# Patient Record
Sex: MALE | Race: WHITE | Employment: FULL TIME | URBAN - METROPOLITAN AREA
[De-identification: names, ages, dates, MRNs, and addresses within clinical notes are randomized per-mention and may not be internally consistent; named-entity substitution may affect disease eponyms.]

---

## 2017-02-17 ENCOUNTER — MEDICAL CORRESPONDENCE (OUTPATIENT)
Dept: TRANSPLANT | Facility: CLINIC | Age: 57
End: 2017-02-17

## 2017-04-27 ENCOUNTER — RADIANT APPOINTMENT (OUTPATIENT)
Dept: CT IMAGING | Facility: CLINIC | Age: 57
End: 2017-04-27

## 2017-04-27 DIAGNOSIS — Z13.9 SCREENING: ICD-10-CM

## 2017-04-27 PROCEDURE — 75571 CT HRT W/O DYE W/CA TEST: CPT | Performed by: RADIOLOGY

## 2017-05-01 ENCOUNTER — TELEPHONE (OUTPATIENT)
Dept: GENERAL RADIOLOGY | Facility: CLINIC | Age: 57
End: 2017-05-01

## 2017-05-01 NOTE — TELEPHONE ENCOUNTER
Called [facility]ille with results of CT calcium score which was 0. This means the patient has no plaque buildup and a low risk of having a heart attack. Encouraged patient to follow up with PCP and reviewed ways to reduce risk of developing heart disease such as exercise regularly, eat a healthy diet full of fruits and vegetables, decrease risk factors by no smoking, limiting alcohol intake and stress. All questions answered, patient verbalized understanding.

## 2018-01-14 ENCOUNTER — MEDICAL CORRESPONDENCE (OUTPATIENT)
Dept: TRANSPLANT | Facility: CLINIC | Age: 58
End: 2018-01-14

## 2018-05-02 ENCOUNTER — TRANSFERRED RECORDS (OUTPATIENT)
Dept: HEALTH INFORMATION MANAGEMENT | Facility: CLINIC | Age: 58
End: 2018-05-02

## 2018-10-22 ENCOUNTER — ALLIED HEALTH/NURSE VISIT (OUTPATIENT)
Dept: TRANSPLANT | Facility: CLINIC | Age: 58
End: 2018-10-22
Attending: INTERNAL MEDICINE
Payer: COMMERCIAL

## 2018-10-22 VITALS
RESPIRATION RATE: 18 BRPM | DIASTOLIC BLOOD PRESSURE: 74 MMHG | TEMPERATURE: 97.8 F | HEIGHT: 70 IN | BODY MASS INDEX: 26.71 KG/M2 | SYSTOLIC BLOOD PRESSURE: 139 MMHG | OXYGEN SATURATION: 100 % | HEART RATE: 97 BPM | WEIGHT: 186.6 LBS

## 2018-10-22 DIAGNOSIS — C90.01 MULTIPLE MYELOMA IN REMISSION (H): ICD-10-CM

## 2018-10-22 DIAGNOSIS — Z71.9 VISIT FOR COUNSELING: Primary | ICD-10-CM

## 2018-10-22 DIAGNOSIS — C90.00 MULTIPLE MYELOMA NOT HAVING ACHIEVED REMISSION (H): Primary | ICD-10-CM

## 2018-10-22 PROCEDURE — 40000268 ZZH STATISTIC NO CHARGES: Mod: ZF

## 2018-10-22 PROCEDURE — G0463 HOSPITAL OUTPT CLINIC VISIT: HCPCS

## 2018-10-22 RX ORDER — CHOLECALCIFEROL (VITAMIN D3) 50 MCG
TABLET ORAL
COMMUNITY
Start: 2004-01-01 | End: 2018-10-22

## 2018-10-22 RX ORDER — QUINIDINE GLUCONATE 324 MG
1 TABLET, EXTENDED RELEASE ORAL DAILY
Status: ON HOLD | COMMUNITY
Start: 2001-01-01 | End: 2019-01-23

## 2018-10-22 RX ORDER — ACETAMINOPHEN 160 MG
2000 TABLET,DISINTEGRATING ORAL DAILY
COMMUNITY

## 2018-10-22 RX ORDER — PROCHLORPERAZINE MALEATE 10 MG
TABLET ORAL
Refills: 6 | COMMUNITY
Start: 2018-03-31 | End: 2019-01-14

## 2018-10-22 RX ORDER — LENALIDOMIDE 25 MG/1
CAPSULE ORAL
COMMUNITY
Start: 2018-09-12 | End: 2019-01-14

## 2018-10-22 RX ORDER — CALCIPOTRIENE 0.05 MG/ML
SOLUTION TOPICAL
COMMUNITY
End: 2018-10-22

## 2018-10-22 RX ORDER — ACYCLOVIR 400 MG/1
400 TABLET ORAL EVERY 12 HOURS
Refills: 11 | Status: ON HOLD | COMMUNITY
Start: 2018-09-16 | End: 2019-05-17

## 2018-10-22 RX ORDER — OMEPRAZOLE 20 MG/1
20 TABLET, DELAYED RELEASE ORAL DAILY
COMMUNITY
Start: 2018-04-07 | End: 2019-07-10

## 2018-10-22 RX ORDER — DEXAMETHASONE 4 MG/1
TABLET ORAL
COMMUNITY
Start: 2018-04-04 | End: 2019-01-14

## 2018-10-22 RX ORDER — PREDNISOLONE ACETATE 10 MG/ML
1-2 SUSPENSION/ DROPS OPHTHALMIC EVERY 4 HOURS PRN
COMMUNITY
Start: 2015-12-09 | End: 2019-05-21

## 2018-10-22 RX ORDER — AMOXICILLIN 500 MG/1
500 CAPSULE ORAL
Refills: 1 | COMMUNITY
Start: 2017-12-30 | End: 2019-05-06

## 2018-10-22 RX ORDER — ASPIRIN 325 MG
325 TABLET ORAL DAILY
COMMUNITY
Start: 2018-04-04 | End: 2019-01-15

## 2018-10-22 RX ORDER — PHENOL 1.4 %
2 AEROSOL, SPRAY (ML) MUCOUS MEMBRANE DAILY
COMMUNITY

## 2018-10-22 RX ORDER — CALCIPOTRIENE 0.05 MG/ML
SOLUTION TOPICAL 2 TIMES DAILY PRN
Refills: 1 | Status: ON HOLD | COMMUNITY
Start: 2017-12-20 | End: 2019-06-05

## 2018-10-22 RX ORDER — ACYCLOVIR 400 MG/1
TABLET ORAL
COMMUNITY
Start: 2018-04-04 | End: 2018-10-22

## 2018-10-22 RX ORDER — TOBRAMYCIN 3 MG/ML
SOLUTION/ DROPS OPHTHALMIC
Refills: 0 | COMMUNITY
Start: 2017-11-18 | End: 2018-10-22

## 2018-10-22 RX ORDER — FOLIC ACID 1 MG/1
TABLET ORAL
COMMUNITY
Start: 2015-12-02 | End: 2018-10-22

## 2018-10-22 NOTE — MR AVS SNAPSHOT
After Visit Summary   10/22/2018    Angel Yanez    MRN: [id number]           Patient Information     Date Of Birth          1960        Visit Information        Provider Department      10/22/2018 4:15 PM Janine Loco LICSW;  2 116 CONSULT [facility] Blood and Marrow Transplant        Today's Diagnoses     Visit for counseling    -  1          [facility] [place] [place]  [address] [place] [address]  Phone: [phone]  Clinic Hours:   Monday-Thursday:7am to 7pm   Friday: 7am to 5pm   Weekends and holidays:    8am to noon (in general)  If your fever is 100.5  or greater,   call the clinic.  After hours call the   hospital at [phone] or   [phone]. Ask for the BMT   fellow on-call            Follow-ups after your visit        Who to contact     If you have questions or need follow up information about today's clinic visit or your schedule please contact [facility] BLOOD AND MARROW TRANSPLANT directly at [phone].  Normal or non-critical lab and imaging results will be communicated to you by Everplaceshart, letter or phone within 4 business days after the clinic has received the results. If you do not hear from us within 7 days, please contact the clinic through Shopogoli[employer] or phone. If you have a critical or abnormal lab result, we will notify you by phone as soon as possible.  Submit refill requests through [employer] or call your pharmacy and they will forward the refill request to us. Please allow 3 business days for your refill to be completed.          Additional Information About Your Visit        Everplaceshart Information     [employer] gives you secure access to your electronic health record. If you see a primary care provider, you can also send messages to your care team and make appointments. If you have questions, please call your primary care clinic.  If you do not have a primary care provider, please call [phone] and they will assist you.        Care  EveryWhere ID     This is your Care EveryWhere ID. This could be used by other organizations to access your [place] medical records  [id number]         Blood Pressure from Last 3 Encounters:   10/22/18 139/74    Weight from Last 3 Encounters:   10/22/18 84.6 kg (186 lb 9.6 oz)              Today, you had the following     No orders found for display         Today's Medication Changes          These changes are accurate as of 10/22/18  4:17 PM.  If you have any questions, ask your nurse or doctor.               These medicines have changed or have updated prescriptions.        Dose/Directions    acyclovir 400 MG tablet   Commonly known as:  ZOVIRAX   This may have changed:  Another medication with the same name was removed. Continue taking this medication, and follow the directions you see here.        Refills:  11       calcipotriene 0.005 % Soln solution   Commonly known as:  DOVONOX   This may have changed:  Another medication with the same name was removed. Continue taking this medication, and follow the directions you see here.        Refills:  1       vitamin D3 2000 units Caps   This may have changed:  Another medication with the same name was removed. Continue taking this medication, and follow the directions you see here.        Dose:  2000 Units   Take 2,000 Units by mouth   Refills:  0         Stop taking these medicines if you haven't already. Please contact your care team if you have questions.     amoxicillin-clavulanate 875-125 MG per tablet   Commonly known as:  AUGMENTIN           folic acid 1 MG tablet   Commonly known as:  FOLVITE           omeprazole 20 MG CR capsule   Commonly known as:  priLOSEC           tobramycin 0.3 % ophthalmic solution   Commonly known as:  TOBREX                    Recent Review Flowsheet Data     BMT Recent Results Latest Ref Rng & Units 11/11/2005 11/15/2005 11/18/2005 11/22/2005 2/24/2006 7/31/2006 2/14/2007    WBC 4.0 - 11.0 10e9/L 3.9(L) 5.3 5.0 4.9 4.1 4.4 5.1     Hemoglobin 13.3 - 17.7 g/dL 15.0 14.9 14.1 14.2 14.6 14.0 14.2    Platelet Count 150 - 450 10e9/L 240 251 239 269 247 222 297    Neutrophils (Absolute) 1.6 - 8.3 10e9/L 2.6 3.1 2.9 2.8 2.1 2.6 2.9    INR 0.86 - 1.14 - - - - - - -    Sodium 133 - 144 mmol/L 142 141 142 142 140 144 143    Potassium 3.4 - 5.3 mmol/L 3.9 4.0 4.1 4.0 4.0 4.0 4.3    Chloride 94 - 109 mmol/L 104 101 105 104 102 108 102    Glucose 60 - 110 mg/dL 122(H) 99 88 91 80 97 77    Urea Nitrogen 5 - 24 mg/dL 13 9 17 13 16 18 15    Creatinine 0.80 - 1.50 mg/dL 0.96 1.03 0.89 1.01 1.01 0.87 1.21    Calcium (Total) 8.5 - 10.4 mg/dL 9.3 9.3 9.0 9.0 9.4 8.9 9.4    Protein (Total) 6.0 - 8.2 g/dL - - 7.9 8.0 8.2 7.8 8.1    Albumin 3.3 - 4.6 g/dL - - 4.2 4.3 - 4.4 4.6    Alkaline Phosphatase 40 - 150 U/L - - 70 61 69 71 93    AST 0 - 55 U/L - - 37 37 - 35 38    ALT 0 - 70 U/L - - 33 37 32 29 44    MCV 78 - 100 fl 95 96 95 97 99 95 96               Primary Care Provider Office Phone # Fax #    [facility] [phone]       [address]  [facility] [address]        Equal Access to Services     PATRICIA HOLLOWAY AH: Hadii aad ku donal Sojoan, waaxda luqadaha, qaybta kaalmada adeegyada, sadie tompkins. [facility] [phone].    ATENCIÓN: Si habla español, tiene a torres disposición servicios gratuitos de asistencia lingüística. Sean vanessa [phone].    We comply with applicable federal civil rights laws and [place] laws. We do not discriminate on the basis of race, color, national origin, age, disability, sex, sexual orientation, or gender identity.            Thank you!     Thank you for choosing [facility] BLOOD AND MARROW TRANSPLANT  for your care. Our goal is always to provide you with excellent care. Hearing back from our patients is one way we can continue to improve our services. Please take a few minutes to complete the written survey that you may receive in the mail after your visit with us.  Thank you!             Your Updated Medication List - Protect others around you: Learn how to safely use, store and throw away your medicines at www.disposemymeds.org.          This list is accurate as of 10/22/18  4:17 PM.  Always use your most recent med list.                   Brand Name Dispense Instructions for use Diagnosis    acyclovir 400 MG tablet    ZOVIRAX          amoxicillin 500 MG capsule    AMOXIL          aspirin 325 MG tablet           calcipotriene 0.005 % Soln solution    DOVONOX          calcium carbonate 600 MG tablet   Generic drug:  calcium carbonate      Take 2 tablets by mouth        dexamethasone 4 MG tablet    DECADRON          Glucosamine-Chondroitin Tabs           methotrexate 2.5 MG tablet CHEMO           prednisoLONE acetate 1 % ophthalmic susp    PRED FORTE          priLOSEC OTC 20 MG tablet   Generic drug:  omeprazole           prochlorperazine 10 MG tablet    COMPAZINE          REVLIMID 25 MG Caps capsule CHEMOTHERAPY   Generic drug:  LENalidomide           UNABLE TO FIND      once a week. Velcade        UNABLE TO FIND           vitamin D3 2000 units Caps      Take 2,000 Units by mouth

## 2018-10-22 NOTE — NURSING NOTE
"Oncology Rooming Note    October 22, 2018 2:43 PM   Angel Yanez is a 58 year old male who presents for:    Chief Complaint   Patient presents with     RECHECK     Pt is here for a New Eval for MM     Initial Vitals: /74  Pulse 97  Temp 97.8  F (36.6  C) (Oral)  Resp 18  Ht 1.79 m (5' 10.47\")  Wt 84.6 kg (186 lb 9.6 oz)  SpO2 100%  BMI 26.42 kg/m2 Estimated body mass index is 26.42 kg/(m^2) as calculated from the following:    Height as of this encounter: 1.79 m (5' 10.47\").    Weight as of this encounter: 84.6 kg (186 lb 9.6 oz). Body surface area is 2.05 meters squared.  Data Unavailable Comment: Data Unavailable   No LMP for male patient.  Allergies reviewed: Yes  Medications reviewed: Yes    Medications: Medication refills not needed today.  Pharmacy name entered into EPIC: Data Unavailable    Clinical concerns: none     6 minutes for nursing intake (face to face time)     Shohsana Salazar MA              "

## 2018-10-22 NOTE — MR AVS SNAPSHOT
After Visit Summary   10/22/2018    Angel Yanez    MRN: [id number]           Patient Information     Date Of Birth          1960        Visit Information        Provider Department      10/22/2018 2:45 PM  BMT DOM [facility] Blood and Marrow Transplant        Today's Diagnoses     Multiple myeloma not having achieved remission (H)    -  1          [facility] ([facility] [place]  [address] [place] [address]  Phone: [phone]  Clinic Hours:   Monday-Thursday:7am to 7pm   Friday: 7am to 5pm   Weekends and holidays:    8am to noon (in general)  If your fever is 100.5  or greater,   call the clinic.  After hours call the   hospital at [phone] or   [phone]. Ask for the BMT   fellow on-call            Follow-ups after your visit        Who to contact     If you have questions or need follow up information about today's clinic visit or your schedule please contact [facility] BLOOD AND MARROW TRANSPLANT directly at [phone].  Normal or non-critical lab and imaging results will be communicated to you by "[employer]hart, letter or phone within 4 business days after the clinic has received the results. If you do not hear from us within 7 days, please contact the clinic through N-Trigt or phone. If you have a critical or abnormal lab result, we will notify you by phone as soon as possible.  Submit refill requests through [employer]un or call your pharmacy and they will forward the refill request to us. Please allow 3 business days for your refill to be completed.          Additional Information About Your Visit        MyChart Information     [employer] gives you secure access to your electronic health record. If you see a primary care provider, you can also send messages to your care team and make appointments. If you have questions, please call your primary care clinic.  If you do not have a primary care provider, please call [phone] and they will assist you.        Care EveryWhere  "ID     This is your Care EveryWhere ID. This could be used by other organizations to access your [place] medical records  [id number]        Your Vitals Were     Pulse Temperature Respirations Height Pulse Oximetry BMI (Body Mass Index)    97 97.8  F (36.6  C) (Oral) 18 1.79 m (5' 10.47\") 100% 26.42 kg/m2       Blood Pressure from Last 3 Encounters:   10/22/18 139/74    Weight from Last 3 Encounters:   10/22/18 84.6 kg (186 lb 9.6 oz)              Today, you had the following     No orders found for display         Today's Medication Changes          These changes are accurate as of 10/22/18 11:59 PM.  If you have any questions, ask your nurse or doctor.               These medicines have changed or have updated prescriptions.        Dose/Directions    acyclovir 400 MG tablet   Commonly known as:  ZOVIRAX   This may have changed:  Another medication with the same name was removed. Continue taking this medication, and follow the directions you see here.        Refills:  11       calcipotriene 0.005 % Soln solution   Commonly known as:  DOVONOX   This may have changed:  Another medication with the same name was removed. Continue taking this medication, and follow the directions you see here.        Refills:  1       vitamin D3 2000 units Caps   This may have changed:  Another medication with the same name was removed. Continue taking this medication, and follow the directions you see here.        Dose:  2000 Units   Take 2,000 Units by mouth   Refills:  0         Stop taking these medicines if you haven't already. Please contact your care team if you have questions.     amoxicillin-clavulanate 875-125 MG per tablet   Commonly known as:  AUGMENTIN           folic acid 1 MG tablet   Commonly known as:  FOLVITE           omeprazole 20 MG CR capsule   Commonly known as:  priLOSEC           tobramycin 0.3 % ophthalmic solution   Commonly known as:  TOBREX                    Recent Review Flowsheet Data     BMT Recent " Results Latest Ref Rng & Units 11/11/2005 11/15/2005 11/18/2005 11/22/2005 2/24/2006 7/31/2006 2/14/2007    WBC 4.0 - 11.0 10e9/L 3.9(L) 5.3 5.0 4.9 4.1 4.4 5.1    Hemoglobin 13.3 - 17.7 g/dL 15.0 14.9 14.1 14.2 14.6 14.0 14.2    Platelet Count 150 - 450 10e9/L 240 251 239 269 247 222 297    Neutrophils (Absolute) 1.6 - 8.3 10e9/L 2.6 3.1 2.9 2.8 2.1 2.6 2.9    INR 0.86 - 1.14 - - - - - - -    Sodium 133 - 144 mmol/L 142 141 142 142 140 144 143    Potassium 3.4 - 5.3 mmol/L 3.9 4.0 4.1 4.0 4.0 4.0 4.3    Chloride 94 - 109 mmol/L 104 101 105 104 102 108 102    Glucose 60 - 110 mg/dL 122(H) 99 88 91 80 97 77    Urea Nitrogen 5 - 24 mg/dL 13 9 17 13 16 18 15    Creatinine 0.80 - 1.50 mg/dL 0.96 1.03 0.89 1.01 1.01 0.87 1.21    Calcium (Total) 8.5 - 10.4 mg/dL 9.3 9.3 9.0 9.0 9.4 8.9 9.4    Protein (Total) 6.0 - 8.2 g/dL - - 7.9 8.0 8.2 7.8 8.1    Albumin 3.3 - 4.6 g/dL - - 4.2 4.3 - 4.4 4.6    Alkaline Phosphatase 40 - 150 U/L - - 70 61 69 71 93    AST 0 - 55 U/L - - 37 37 - 35 38    ALT 0 - 70 U/L - - 33 37 32 29 44    MCV 78 - 100 fl 95 96 95 97 99 95 96               Primary Care Provider Office Phone # Fax #    [facility] [phone]       [address]  [facility] [address]        Equal Access to Services     PATRICIA HOLLOWAY AH: Hadii aad ku hadjose albertotiffanie Sojoan, waaxda luqadaha, qaybta kaalmada luis, sadie tompkins. So [facility] [place] [phone].    ATENCIÓN: Si habla español, tiene a torres disposición servicios gratuitos de asistencia lingüística. Llame al [phone].    We comply with applicable federal civil rights laws and [place] laws. We do not discriminate on the basis of race, color, national origin, age, disability, sex, sexual orientation, or gender identity.            Thank you!     Thank you for choosing [facility] BLOOD AND MARROW TRANSPLANT  for your care. Our goal is always to provide you with excellent care. Hearing back from our patients is  one way we can continue to improve our services. Please take a few minutes to complete the written survey that you may receive in the mail after your visit with us. Thank you!             Your Updated Medication List - Protect others around you: Learn how to safely use, store and throw away your medicines at www.disposemymeds.org.          This list is accurate as of 10/22/18 11:59 PM.  Always use your most recent med list.                   Brand Name Dispense Instructions for use Diagnosis    acyclovir 400 MG tablet    ZOVIRAX          amoxicillin 500 MG capsule    AMOXIL          aspirin 325 MG tablet           calcipotriene 0.005 % Soln solution    DOVONOX          calcium carbonate 600 MG tablet   Generic drug:  calcium carbonate      Take 2 tablets by mouth        dexamethasone 4 MG tablet    DECADRON          Glucosamine-Chondroitin Tabs           methotrexate 2.5 MG tablet CHEMO           prednisoLONE acetate 1 % ophthalmic susp    PRED FORTE          priLOSEC OTC 20 MG tablet   Generic drug:  omeprazole           prochlorperazine 10 MG tablet    COMPAZINE          REVLIMID 25 MG Caps capsule CHEMOTHERAPY   Generic drug:  LENalidomide           UNABLE TO FIND      once a week. Velcade        UNABLE TO FIND           vitamin D3 2000 units Caps      Take 2,000 Units by mouth

## 2018-10-22 NOTE — PROGRESS NOTES
"/74  Pulse 97  Temp 97.8  F (36.6  C) (Oral)  Resp 18  Ht 1.79 m (5' 10.47\")  Wt 84.6 kg (186 lb 9.6 oz)  SpO2 100%  BMI 26.42 kg/m2  Wt Readings from Last 4 Encounters:   10/22/18 84.6 kg (186 lb 9.6 oz)     This is a repeat consultation for this 58-year-old man with IgA kappa myeloma.  Extensive detail of his past history is outlined in previous notes from here at the [facility] and in recent notes from his physician,  Dr. Vance.    Briefly he was diagnosed with IgA kappa myeloma in 2004 with 6.7 g M protein treated with thal/Dex and then autologous transplant at the [facility] in February 2005 leading to complete remission.  He stayed in remission for a long time--until late 2016 his M protein started to increase.  In March 2018 it was 0.9 g, IgA was 1350, bone marrow had 60% involvement (other pathologic interpretations report it at 30-40%) and he had standard cytogenetics with deletion 13.  He had no focal bone lesions.  He was treated with RVD which he has tolerated very well from April until now.  He has had progressive fall in his M protein but perhaps a plateau at 0.3-0.4 g with similar IgA levels since August.  He has had no neuropathy, need for transfusion, fever chills rash bleeding bruising GI  or respiratory symptomatology.  His psoriasis is cleared substantially.    His other past medical history was psoriasis with psoriatic arthritis and hyperlipidemia.  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  He is a non-smoker does not drink alcohol.  His recent blood counts, renal function, calcium and liver function tests have done well.    His exam shows his weight has been steady through the period of his treatment during in 2018 and his vital signs are acceptable.  He has no focal bone tenderness at any site.  He has no skin rash but has some healed areas from previous psoriasis.  He has no active arthritis or focal bone tenderness.  He has no " bruises or petechiae but does have the signs of infection.  Interestingly he gets welts of his bortezomib injections are in his abdomen but not a third given subcutaneously in his extremities, particularly his arms.  His lungs are clear his heart tones are regular without a gallop or murmur.  His abdomen is soft.  He has no hepatosplenomegaly masses or tenderness.  He has no peripheral edema.  A limited neurologic exam showed intact cranial nerves and brisk upper and lower deep tendon reflexes.  Heel and toe walking was fine.    We had an extended (60-minute, nearly all in counseling) discussion about the options of management.  He had an encouraging very long remission following his autograft, nearly 13 years and is responding well to RVD at this point.  He has plateaued in [place], but is not yet at a minimal disease state.    A consultation at [place] implied that now is the time to proceed, but I told him that the goal (which I think would produce the best likelihood of another extended remission) would be to get him to a minimal disease state before considering autologous hematopoietic stem cell transplant and of course maintenance therapy thereafter.    We reviewed a variety of new agents which are available and I suggested that since he has not had toxicity and had such a good response to his recent RVD, one augmented approach may be to add daratumumab for 2-3 months in the conventional weekly schedule for 8 weeks and then every other week for 4 doses to achieve a minimal disease state.  It would be appropriate to monitor his M protein at 4-week intervals to ensure that he is tolerating it well and responding further but that might offer the least cumulatively toxic approach to getting him to minimal disease.    We reviewed a number of other drug options but suggested that would not be necessary to change treatments at this point but rather add might be a more expeditious approach.    His questions were answered in  full and I told him be happy to see him again at any time.  He is aware that there is a contractual separation between the [facility] and [facility] and we would not be, at least under present circumstances, able to perform an autologous transplant for him again here even though his long-term care here at the [place] and the disruption accompanying moving to [place] for a period of time would be a hardship.  He understands the issues involved in those contractual limitations to our ability to offer him transplantation therapy now but he knows he can call with additional questions at any time.    PS; after discussion with his outside MD, if only 3 drug therapy is now accepted, we considered jaren/Tip/dex as a good choice at this point./AAMIR Lucio MD    Professor of Medicine

## 2018-10-22 NOTE — LETTER
"10/22/2018       RE: Angel Yanez  [address]  [facility] [address]     Dear Colleague,    Thank you for referring your patient, Angel Yanez, to the [facility] BLOOD AND MARROW TRANSPLANT at [facility]. Please see a copy of my visit note below.    /74  Pulse 97  Temp 97.8  F (36.6  C) (Oral)  Resp 18  Ht 1.79 m (5' 10.47\")  Wt 84.6 kg (186 lb 9.6 oz)  SpO2 100%  BMI 26.42 kg/m2  Wt Readings from Last 4 Encounters:   10/22/18 84.6 kg (186 lb 9.6 oz)     This is a repeat consultation for this 58-year-old man with IgA kappa myeloma.  Extensive detail of his past history is outlined in previous notes from here at the [facility] and in recent notes from his physician,  Dr. Vance.    Briefly he was diagnosed with IgA kappa myeloma in 2004 with 6.7 g M protein treated with thal/Dex and then autologous transplant at the [facility] in February 2005 leading to complete remission.  He stayed in remission for a long time--until late 2016 his M protein started to increase.  In March 2018 it was 0.9 g, IgA was 1350, bone marrow had 60% involvement (other pathologic interpretations report it at 30-40%) and he had standard cytogenetics with deletion 13.  He had no focal bone lesions.  He was treated with RVD which he has tolerated very well from April until now.  He has had progressive fall in his M protein but perhaps a plateau at 0.3-0.4 g with similar IgA levels since August.  He has had no neuropathy, need for transfusion, fever chills rash bleeding bruising GI  or respiratory symptomatology.  His psoriasis is cleared substantially.    His other past medical history was psoriasis with psoriatic arthritis and hyperlipidemia.  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  He is a non-smoker does not drink alcohol.  His recent blood counts, renal function, calcium and liver function tests have done well.    His exam " shows his weight has been steady through the period of his treatment during in 2018 and his vital signs are acceptable.  He has no focal bone tenderness at any site.  He has no skin rash but has some healed areas from previous psoriasis.  He has no active arthritis or focal bone tenderness.  He has no bruises or petechiae but does have the signs of infection.  Interestingly he gets welts of his bortezomib injections are in his abdomen but not a third given subcutaneously in his extremities, particularly his arms.  His lungs are clear his heart tones are regular without a gallop or murmur.  His abdomen is soft.  He has no hepatosplenomegaly masses or tenderness.  He has no peripheral edema.  A limited neurologic exam showed intact cranial nerves and brisk upper and lower deep tendon reflexes.  Heel and toe walking was fine.    We had an extended (60-minute) discussion about the options of management.  He had an encouraging very long remission following his autograft, nearly 13 years and is responding well to RVD at this point.  He has plateaued in SD, but is not yet at a minimal disease state.    A consultation at [facility] implied that now is the time to proceed, but I told him that the goal (which I think would produce the best likelihood of another extended remission) would be to get him to a minimal disease state before considering autologous hematopoietic stem cell transplant and of course maintenance therapy thereafter.    We reviewed a variety of new agents which are available and I suggested that since he has not had toxicity and had such a good response to his recent RVD, one augmented approach may be to add daratumumab for 2-3 months in the conventional weekly schedule for 8 weeks and then every other week for 4 doses to achieve a minimal disease state.  It would be appropriate to monitor his M protein at 4-week intervals to ensure that he is tolerating it well and responding further but that might offer the  least cumulatively toxic approach to getting him to minimal disease.    We reviewed a number of other drug options but suggested that would not be necessary to change treatments at this point but rather add might be a more expeditious approach.    His questions were answered in full and I told him be happy to see him again at any time.  He is aware that there is a contractual separation between the [facility] and [facility] and we would not be, at least under present circumstances, able to perform an autologous transplant for him again here even though his long-term care here at the [facility] and the disruption accompanying moving to [place] for a period of time would be a hardship.  He understands the issues involved in those contractual limitations to our ability to offer him transplantation therapy now but he knows he can call with additional questions at any time.    PS; after discussion with his outside MD, if only 3 drug therapy is now accepted, we considered jaren/Tip/dex as a good choice at this point./AAMIR Lucio MD    Professor of Medicine

## 2018-10-22 NOTE — MR AVS SNAPSHOT
After Visit Summary   10/22/2018    Angel Yanez    MRN: [id number]           Patient Information     Date Of Birth          1960        Visit Information        Provider Department      10/22/2018 3:45 PM Coordinator,  Bmt Nurse [facility] Blood and Marrow Transplant        Today's Diagnoses     Multiple myeloma in remission (H)              [facility] ([facility])  [address] [place] [address]  Phone: [phone]  Clinic Hours:   Monday-Thursday:7am to 7pm   Friday: 7am to 5pm   Weekends and holidays:    8am to noon (in general)  If your fever is 100.5  or greater,   call the clinic.  After hours call the   hospital at [phone] or   [phone]. Ask for the BMT   fellow on-call            Follow-ups after your visit        Who to contact     If you have questions or need follow up information about today's clinic visit or your schedule please contact [facility] BLOOD AND MARROW TRANSPLANT directly at [phone].  Normal or non-critical lab and imaging results will be communicated to you by S5 Wirelesshart, letter or phone within 4 business days after the clinic has received the results. If you do not hear from us within 7 days, please contact the clinic through Bookmatet or phone. If you have a critical or abnormal lab result, we will notify you by phone as soon as possible.  Submit refill requests through Yopima or call your pharmacy and they will forward the refill request to us. Please allow 3 business days for your refill to be completed.          Additional Information About Your Visit        S5 WirelessharMetaforic Information     Yopima gives you secure access to your electronic health record. If you see a primary care provider, you can also send messages to your care team and make appointments. If you have questions, please call your primary care clinic.  If you do not have a primary care provider, please call [phone] and they will assist you.        Care EveryWhere ID      This is your Care EveryWhere ID. This could be used by other organizations to access your [facility] medical records  [id number]         Blood Pressure from Last 3 Encounters:   10/22/18 139/74    Weight from Last 3 Encounters:   10/22/18 84.6 kg (186 lb 9.6 oz)              Today, you had the following     No orders found for display         Today's Medication Changes          These changes are accurate as of 10/22/18 11:59 PM.  If you have any questions, ask your nurse or doctor.               These medicines have changed or have updated prescriptions.        Dose/Directions    acyclovir 400 MG tablet   Commonly known as:  ZOVIRAX   This may have changed:  Another medication with the same name was removed. Continue taking this medication, and follow the directions you see here.        Refills:  11       calcipotriene 0.005 % Soln solution   Commonly known as:  DOVONOX   This may have changed:  Another medication with the same name was removed. Continue taking this medication, and follow the directions you see here.        Refills:  1       vitamin D3 2000 units Caps   This may have changed:  Another medication with the same name was removed. Continue taking this medication, and follow the directions you see here.        Dose:  2000 Units   Take 2,000 Units by mouth   Refills:  0         Stop taking these medicines if you haven't already. Please contact your care team if you have questions.     amoxicillin-clavulanate 875-125 MG per tablet   Commonly known as:  AUGMENTIN           folic acid 1 MG tablet   Commonly known as:  FOLVITE           omeprazole 20 MG CR capsule   Commonly known as:  priLOSEC           tobramycin 0.3 % ophthalmic solution   Commonly known as:  TOBREX                    Recent Review Flowsheet Data     BMT Recent Results Latest Ref Rng & Units 11/11/2005 11/15/2005 11/18/2005 11/22/2005 2/24/2006 7/31/2006 2/14/2007    WBC 4.0 - 11.0 10e9/L 3.9(L) 5.3 5.0 4.9 4.1 4.4 5.1    Hemoglobin  13.3 - 17.7 g/dL 15.0 14.9 14.1 14.2 14.6 14.0 14.2    Platelet Count 150 - 450 10e9/L 240 251 239 269 247 222 297    Neutrophils (Absolute) 1.6 - 8.3 10e9/L 2.6 3.1 2.9 2.8 2.1 2.6 2.9    INR 0.86 - 1.14 - - - - - - -    Sodium 133 - 144 mmol/L 142 141 142 142 140 144 143    Potassium 3.4 - 5.3 mmol/L 3.9 4.0 4.1 4.0 4.0 4.0 4.3    Chloride 94 - 109 mmol/L 104 101 105 104 102 108 102    Glucose 60 - 110 mg/dL 122(H) 99 88 91 80 97 77    Urea Nitrogen 5 - 24 mg/dL 13 9 17 13 16 18 15    Creatinine 0.80 - 1.50 mg/dL 0.96 1.03 0.89 1.01 1.01 0.87 1.21    Calcium (Total) 8.5 - 10.4 mg/dL 9.3 9.3 9.0 9.0 9.4 8.9 9.4    Protein (Total) 6.0 - 8.2 g/dL - - 7.9 8.0 8.2 7.8 8.1    Albumin 3.3 - 4.6 g/dL - - 4.2 4.3 - 4.4 4.6    Alkaline Phosphatase 40 - 150 U/L - - 70 61 69 71 93    AST 0 - 55 U/L - - 37 37 - 35 38    ALT 0 - 70 U/L - - 33 37 32 29 44    MCV 78 - 100 fl 95 96 95 97 99 95 96               Primary Care Provider Office Phone # Fax #    [facility] [phone]       [address]  [facility] [address]        Equal Access to Services     [facility] AMIE AH: Hadii aad ku donal Sojoan, waaxda luqadaha, qaybta kaalmada adeegyada, sadie espinon meaghan tompkins. [facility] [phone].    ATENCIÓN: Si habla español, tiene a torres disposición servicios gratuitos de asistencia lingüística. Sean vanessa [phone].    We comply with applicable federal civil rights laws and [place] laws. We do not discriminate on the basis of race, color, national origin, age, disability, sex, sexual orientation, or gender identity.            Thank you!     Thank you for choosing [facility] BLOOD AND MARROW TRANSPLANT  for your care. Our goal is always to provide you with excellent care. Hearing back from our patients is one way we can continue to improve our services. Please take a few minutes to complete the written survey that you may receive in the mail after your visit with us. Thank  you!             Your Updated Medication List - Protect others around you: Learn how to safely use, store and throw away your medicines at www.disposemymeds.org.          This list is accurate as of 10/22/18 11:59 PM.  Always use your most recent med list.                   Brand Name Dispense Instructions for use Diagnosis    acyclovir 400 MG tablet    ZOVIRAX          amoxicillin 500 MG capsule    AMOXIL          aspirin 325 MG tablet           calcipotriene 0.005 % Soln solution    DOVONOX          calcium carbonate 600 MG tablet   Generic drug:  calcium carbonate      Take 2 tablets by mouth        dexamethasone 4 MG tablet    DECADRON          Glucosamine-Chondroitin Tabs           methotrexate 2.5 MG tablet CHEMO           prednisoLONE acetate 1 % ophthalmic susp    PRED FORTE          priLOSEC OTC 20 MG tablet   Generic drug:  omeprazole           prochlorperazine 10 MG tablet    COMPAZINE          REVLIMID 25 MG Caps capsule CHEMOTHERAPY   Generic drug:  LENalidomide           UNABLE TO FIND      once a week. Velcade        UNABLE TO FIND           vitamin D3 2000 units Caps      Take 2,000 Units by mouth

## 2018-10-22 NOTE — PROGRESS NOTES
Clinical   Blood and Marrow Transplant Service    Medical team shared that patient does not require a transplant at this time so did not meet with writer today. He is s/p Autologous PBSCT at [facility] in 2005. He also has [employer] insurance and they would not cover an Autologous PBSCT at [facility] due to contractual limitations at this time.    Janine MOFFETT LICSW  10/22/2018

## 2018-11-02 PROBLEM — C90.01 MULTIPLE MYELOMA IN REMISSION (H): Status: ACTIVE | Noted: 2018-11-02

## 2018-11-02 NOTE — PROGRESS NOTES
Met with Angel mujica during his consult with dr Lucio to discuss another stem cell transplant. He has had a transplant here , and if familiar with  The process. I did give him contact information again, and encouraged him to call if he has any further questions. Pt verbalized understanding of this information .

## 2018-12-27 DIAGNOSIS — C90.00 MULTIPLE MYELOMA (H): Primary | ICD-10-CM

## 2018-12-27 DIAGNOSIS — Z86.2 PERSONAL HISTORY OF DISEASES OF BLOOD AND BLOOD-FORMING ORGANS: ICD-10-CM

## 2019-01-07 ENCOUNTER — PATIENT OUTREACH (OUTPATIENT)
Dept: CARE COORDINATION | Facility: CLINIC | Age: 59
End: 2019-01-07

## 2019-01-08 NOTE — TELEPHONE ENCOUNTER
FUTURE VISIT INFORMATION      FUTURE VISIT INFORMATION:    Date: 1/15    Time: 840    Location: Vascular  REFERRAL INFORMATION:    Referring provider:       Referring providers clinic:       Reason for visit/diagnosis       RECORDS REQUESTED FROM:       Clinic name Comments Records Status Imaging Status                                         All Records Internal

## 2019-01-14 ENCOUNTER — APPOINTMENT (OUTPATIENT)
Dept: LAB | Facility: CLINIC | Age: 59
End: 2019-01-14
Attending: INTERNAL MEDICINE
Payer: COMMERCIAL

## 2019-01-14 ENCOUNTER — HOSPITAL ENCOUNTER (OUTPATIENT)
Dept: NUCLEAR MEDICINE | Facility: CLINIC | Age: 59
Setting detail: NUCLEAR MEDICINE
Discharge: HOME | End: 2019-01-14
Attending: INTERNAL MEDICINE | Admitting: INTERNAL MEDICINE
Payer: COMMERCIAL

## 2019-01-14 ENCOUNTER — MEDICAL CORRESPONDENCE (OUTPATIENT)
Dept: TRANSPLANT | Facility: CLINIC | Age: 59
End: 2019-01-14

## 2019-01-14 ENCOUNTER — ANCILLARY PROCEDURE (OUTPATIENT)
Dept: GENERAL RADIOLOGY | Facility: CLINIC | Age: 59
End: 2019-01-14
Attending: INTERNAL MEDICINE
Payer: COMMERCIAL

## 2019-01-14 ENCOUNTER — OFFICE VISIT (OUTPATIENT)
Dept: TRANSPLANT | Facility: CLINIC | Age: 59
End: 2019-01-14
Attending: INTERNAL MEDICINE
Payer: COMMERCIAL

## 2019-01-14 VITALS
WEIGHT: 186 LBS | SYSTOLIC BLOOD PRESSURE: 154 MMHG | BODY MASS INDEX: 27.55 KG/M2 | DIASTOLIC BLOOD PRESSURE: 100 MMHG | OXYGEN SATURATION: 98 % | TEMPERATURE: 98.8 F | RESPIRATION RATE: 16 BRPM | HEART RATE: 94 BPM | HEIGHT: 69 IN

## 2019-01-14 DIAGNOSIS — Z86.2 PERSONAL HISTORY OF DISEASES OF BLOOD AND BLOOD-FORMING ORGANS: ICD-10-CM

## 2019-01-14 DIAGNOSIS — C90.00 MULTIPLE MYELOMA (H): ICD-10-CM

## 2019-01-14 DIAGNOSIS — C90.01 MULTIPLE MYELOMA IN REMISSION (H): ICD-10-CM

## 2019-01-14 LAB
ABO + RH BLD: NORMAL
ABO + RH BLD: NORMAL
ALBUMIN SERPL-MCNC: 3.6 G/DL (ref 3.4–5)
ALBUMIN UR-MCNC: NEGATIVE MG/DL
ALP SERPL-CCNC: 75 U/L (ref 40–150)
ALT SERPL W P-5'-P-CCNC: 21 U/L (ref 0–70)
ANION GAP SERPL CALCULATED.3IONS-SCNC: 6 MMOL/L (ref 3–14)
APPEARANCE UR: CLEAR
APTT PPP: 33 SEC (ref 22–37)
AST SERPL W P-5'-P-CCNC: 18 U/L (ref 0–45)
B2 MICROGLOB SERPL-MCNC: 2 MG/L
BASOPHILS # BLD AUTO: 0 10E9/L (ref 0–0.2)
BASOPHILS NFR BLD AUTO: 0.8 %
BILIRUB SERPL-MCNC: 0.4 MG/DL (ref 0.2–1.3)
BILIRUB UR QL STRIP: NEGATIVE
BLD GP AB SCN SERPL QL: NORMAL
BLOOD BANK CMNT PATIENT-IMP: NORMAL
BUN SERPL-MCNC: 22 MG/DL (ref 7–30)
CALCIUM SERPL-MCNC: 8.7 MG/DL (ref 8.5–10.1)
CHLORIDE SERPL-SCNC: 106 MMOL/L (ref 94–109)
CO2 SERPL-SCNC: 24 MMOL/L (ref 20–32)
COLOR UR AUTO: YELLOW
CREAT SERPL-MCNC: 0.78 MG/DL (ref 0.66–1.25)
DIFFERENTIAL METHOD BLD: ABNORMAL
EBV VCA IGG SER QL IA: >8 AI (ref 0–0.8)
EOSINOPHIL # BLD AUTO: 0 10E9/L (ref 0–0.7)
EOSINOPHIL NFR BLD AUTO: 1.1 %
ERYTHROCYTE [DISTWIDTH] IN BLOOD BY AUTOMATED COUNT: 13.4 % (ref 10–15)
GFR SERPL CREATININE-BSD FRML MDRD: >90 ML/MIN/{1.73_M2}
GLUCOSE SERPL-MCNC: 116 MG/DL (ref 70–99)
GLUCOSE UR STRIP-MCNC: NEGATIVE MG/DL
HCT VFR BLD AUTO: 40.8 % (ref 40–53)
HGB BLD-MCNC: 14.3 G/DL (ref 13.3–17.7)
HGB UR QL STRIP: NEGATIVE
HSV1 IGG SERPL QL IA: <0.2 AI (ref 0–0.8)
HSV2 IGG SERPL QL IA: 5.7 AI (ref 0–0.8)
IMM GRANULOCYTES # BLD: 0 10E9/L (ref 0–0.4)
IMM GRANULOCYTES NFR BLD: 0 %
INR PPP: 0.99 (ref 0.86–1.14)
KETONES UR STRIP-MCNC: NEGATIVE MG/DL
LAB SCANNED RESULT: NORMAL
LDH SERPL L TO P-CCNC: 219 U/L (ref 85–227)
LEUKOCYTE ESTERASE UR QL STRIP: NEGATIVE
LYMPHOCYTES # BLD AUTO: 1.5 10E9/L (ref 0.8–5.3)
LYMPHOCYTES NFR BLD AUTO: 40.5 %
MAGNESIUM SERPL-MCNC: 2 MG/DL (ref 1.6–2.3)
MCH RBC QN AUTO: 35.6 PG (ref 26.5–33)
MCHC RBC AUTO-ENTMCNC: 35 G/DL (ref 31.5–36.5)
MCV RBC AUTO: 102 FL (ref 78–100)
MONOCYTES # BLD AUTO: 0.5 10E9/L (ref 0–1.3)
MONOCYTES NFR BLD AUTO: 14.5 %
MUCOUS THREADS #/AREA URNS LPF: PRESENT /LPF
NEUTROPHILS # BLD AUTO: 1.5 10E9/L (ref 1.6–8.3)
NEUTROPHILS NFR BLD AUTO: 43.1 %
NITRATE UR QL: NEGATIVE
NRBC # BLD AUTO: 0 10*3/UL
NRBC BLD AUTO-RTO: 0 /100
PH UR STRIP: 5 PH (ref 5–7)
PHOSPHATE SERPL-MCNC: 2.6 MG/DL (ref 2.5–4.5)
PLATELET # BLD AUTO: 136 10E9/L (ref 150–450)
POTASSIUM SERPL-SCNC: 3.9 MMOL/L (ref 3.4–5.3)
PROT SERPL-MCNC: 7.3 G/DL (ref 6.8–8.8)
RBC # BLD AUTO: 4.02 10E12/L (ref 4.4–5.9)
RBC #/AREA URNS AUTO: <1 /HPF (ref 0–2)
SODIUM SERPL-SCNC: 137 MMOL/L (ref 133–144)
SOURCE: ABNORMAL
SP GR UR STRIP: 1.02 (ref 1–1.03)
SPECIMEN EXP DATE BLD: NORMAL
URATE SERPL-MCNC: 4.2 MG/DL (ref 3.5–7.2)
UROBILINOGEN UR STRIP-MCNC: 0 MG/DL (ref 0–2)
WBC # BLD AUTO: 3.6 10E9/L (ref 4–11)
WBC #/AREA URNS AUTO: 1 /HPF (ref 0–5)

## 2019-01-14 PROCEDURE — 86901 BLOOD TYPING SEROLOGIC RH(D): CPT | Performed by: INTERNAL MEDICINE

## 2019-01-14 PROCEDURE — 83615 LACTATE (LD) (LDH) ENZYME: CPT | Performed by: INTERNAL MEDICINE

## 2019-01-14 PROCEDURE — 86703 HIV-1/HIV-2 1 RESULT ANTBDY: CPT | Performed by: INTERNAL MEDICINE

## 2019-01-14 PROCEDURE — 85025 COMPLETE CBC W/AUTO DIFF WBC: CPT | Performed by: INTERNAL MEDICINE

## 2019-01-14 PROCEDURE — 83883 ASSAY NEPHELOMETRY NOT SPEC: CPT | Performed by: INTERNAL MEDICINE

## 2019-01-14 PROCEDURE — 82784 ASSAY IGA/IGD/IGG/IGM EACH: CPT | Performed by: INTERNAL MEDICINE

## 2019-01-14 PROCEDURE — 81001 URINALYSIS AUTO W/SCOPE: CPT | Performed by: INTERNAL MEDICINE

## 2019-01-14 PROCEDURE — 34300033 ZZH RX 343: Performed by: INTERNAL MEDICINE

## 2019-01-14 PROCEDURE — 86900 BLOOD TYPING SEROLOGIC ABO: CPT | Performed by: INTERNAL MEDICINE

## 2019-01-14 PROCEDURE — 80053 COMPREHEN METABOLIC PANEL: CPT | Performed by: INTERNAL MEDICINE

## 2019-01-14 PROCEDURE — A9560 TC99M LABELED RBC: HCPCS | Performed by: INTERNAL MEDICINE

## 2019-01-14 PROCEDURE — 86780 TREPONEMA PALLIDUM: CPT | Performed by: INTERNAL MEDICINE

## 2019-01-14 PROCEDURE — 84550 ASSAY OF BLOOD/URIC ACID: CPT | Performed by: INTERNAL MEDICINE

## 2019-01-14 PROCEDURE — 86695 HERPES SIMPLEX TYPE 1 TEST: CPT | Performed by: INTERNAL MEDICINE

## 2019-01-14 PROCEDURE — G0463 HOSPITAL OUTPT CLINIC VISIT: HCPCS | Mod: 25,ZF

## 2019-01-14 PROCEDURE — 83735 ASSAY OF MAGNESIUM: CPT | Performed by: INTERNAL MEDICINE

## 2019-01-14 PROCEDURE — 85730 THROMBOPLASTIN TIME PARTIAL: CPT | Performed by: INTERNAL MEDICINE

## 2019-01-14 PROCEDURE — 86704 HEP B CORE ANTIBODY TOTAL: CPT | Performed by: INTERNAL MEDICINE

## 2019-01-14 PROCEDURE — 86665 EPSTEIN-BARR CAPSID VCA: CPT | Performed by: INTERNAL MEDICINE

## 2019-01-14 PROCEDURE — 87340 HEPATITIS B SURFACE AG IA: CPT | Performed by: INTERNAL MEDICINE

## 2019-01-14 PROCEDURE — 84100 ASSAY OF PHOSPHORUS: CPT | Performed by: INTERNAL MEDICINE

## 2019-01-14 PROCEDURE — 87516 HEPATITIS B DNA AMP PROBE: CPT | Performed by: INTERNAL MEDICINE

## 2019-01-14 PROCEDURE — 82785 ASSAY OF IGE: CPT | Performed by: INTERNAL MEDICINE

## 2019-01-14 PROCEDURE — 86696 HERPES SIMPLEX TYPE 2 TEST: CPT | Performed by: INTERNAL MEDICINE

## 2019-01-14 PROCEDURE — 00000402 ZZHCL STATISTIC TOTAL PROTEIN: Performed by: INTERNAL MEDICINE

## 2019-01-14 PROCEDURE — 78472 GATED HEART PLANAR SINGLE: CPT

## 2019-01-14 PROCEDURE — 84165 PROTEIN E-PHORESIS SERUM: CPT | Performed by: INTERNAL MEDICINE

## 2019-01-14 PROCEDURE — 85610 PROTHROMBIN TIME: CPT | Performed by: INTERNAL MEDICINE

## 2019-01-14 PROCEDURE — 82232 ASSAY OF BETA-2 PROTEIN: CPT | Performed by: INTERNAL MEDICINE

## 2019-01-14 PROCEDURE — 86753 PROTOZOA ANTIBODY NOS: CPT | Performed by: INTERNAL MEDICINE

## 2019-01-14 PROCEDURE — 86850 RBC ANTIBODY SCREEN: CPT | Performed by: INTERNAL MEDICINE

## 2019-01-14 PROCEDURE — 87798 DETECT AGENT NOS DNA AMP: CPT | Performed by: INTERNAL MEDICINE

## 2019-01-14 PROCEDURE — 87521 HEPATITIS C PROBE&RVRS TRNSC: CPT | Performed by: INTERNAL MEDICINE

## 2019-01-14 PROCEDURE — 87535 HIV-1 PROBE&REVERSE TRNSCRPJ: CPT | Performed by: INTERNAL MEDICINE

## 2019-01-14 PROCEDURE — 86803 HEPATITIS C AB TEST: CPT | Performed by: INTERNAL MEDICINE

## 2019-01-14 PROCEDURE — 83021 HEMOGLOBIN CHROMOTOGRAPHY: CPT | Performed by: INTERNAL MEDICINE

## 2019-01-14 PROCEDURE — 86644 CMV ANTIBODY: CPT | Performed by: INTERNAL MEDICINE

## 2019-01-14 PROCEDURE — 86687 HTLV-I ANTIBODY: CPT | Performed by: INTERNAL MEDICINE

## 2019-01-14 RX ADMIN — Medication 29.6 MILLICURIE: at 14:17

## 2019-01-14 ASSESSMENT — PAIN SCALES - GENERAL: PAINLEVEL: MILD PAIN (2)

## 2019-01-14 ASSESSMENT — MIFFLIN-ST. JEOR: SCORE: 1644.07

## 2019-01-14 NOTE — PROGRESS NOTES
BMT Teaching Flowsheet    Angel Yanez is a 58 year old male  Diagnoses of Multiple myeloma (H) and Personal history of diseases of blood and blood-forming organs were pertinent to this visit.    Teaching Topic: work up    Person(s) involved in teaching: Patient  Motivation Level  Asks Questions: Yes  Eager to Learn: Yes  Cooperative: Yes  Receptive (willing/able to accept information): Yes  Any cultural factors/Islam beliefs that may influence understanding or compliance? No    Patient demonstrates understanding of the following:  - Reason for the appointment, diagnosis and treatment plan: Yes    Teaching concerns addressed: calendar review, consents explained and signed, vitals, height weight done, patient asked questions and verbalized understanding    Instructional Materials Used/Given: consents, calendar    Time spent with patient: 60 minutes.

## 2019-01-15 ENCOUNTER — OFFICE VISIT (OUTPATIENT)
Dept: VASCULAR SURGERY | Facility: CLINIC | Age: 59
End: 2019-01-15
Attending: INTERNAL MEDICINE
Payer: COMMERCIAL

## 2019-01-15 ENCOUNTER — ALLIED HEALTH/NURSE VISIT (OUTPATIENT)
Dept: TRANSPLANT | Facility: CLINIC | Age: 59
End: 2019-01-15
Attending: INTERNAL MEDICINE
Payer: COMMERCIAL

## 2019-01-15 ENCOUNTER — PRE VISIT (OUTPATIENT)
Dept: VASCULAR SURGERY | Facility: CLINIC | Age: 59
End: 2019-01-15

## 2019-01-15 ENCOUNTER — OFFICE VISIT (OUTPATIENT)
Dept: TRANSPLANT | Facility: CLINIC | Age: 59
End: 2019-01-15
Attending: NURSE PRACTITIONER
Payer: COMMERCIAL

## 2019-01-15 VITALS
OXYGEN SATURATION: 97 % | RESPIRATION RATE: 16 BRPM | DIASTOLIC BLOOD PRESSURE: 80 MMHG | TEMPERATURE: 98.4 F | WEIGHT: 186.2 LBS | HEART RATE: 77 BPM | SYSTOLIC BLOOD PRESSURE: 125 MMHG | BODY MASS INDEX: 27.5 KG/M2

## 2019-01-15 VITALS — OXYGEN SATURATION: 96 % | DIASTOLIC BLOOD PRESSURE: 87 MMHG | HEART RATE: 86 BPM | SYSTOLIC BLOOD PRESSURE: 128 MMHG

## 2019-01-15 DIAGNOSIS — C90.00 MULTIPLE MYELOMA (H): ICD-10-CM

## 2019-01-15 DIAGNOSIS — Z86.2 PERSONAL HISTORY OF DISEASES OF BLOOD AND BLOOD-FORMING ORGANS: ICD-10-CM

## 2019-01-15 DIAGNOSIS — C90.01 MULTIPLE MYELOMA IN REMISSION (H): Primary | ICD-10-CM

## 2019-01-15 DIAGNOSIS — Z71.9 ENCOUNTER FOR COUNSELING: Primary | ICD-10-CM

## 2019-01-15 LAB
ALBUMIN SERPL ELPH-MCNC: 4.3 G/DL (ref 3.7–5.1)
ALPHA1 GLOB SERPL ELPH-MCNC: 0.3 G/DL (ref 0.2–0.4)
ALPHA2 GLOB SERPL ELPH-MCNC: 0.7 G/DL (ref 0.5–0.9)
B-GLOBULIN SERPL ELPH-MCNC: 0.7 G/DL (ref 0.6–1)
BASOPHILS # BLD AUTO: 0 10E9/L (ref 0–0.2)
BASOPHILS NFR BLD AUTO: 0.6 %
COLLECT DURATION TIME UR: 22 H
CREAT 24H UR-MRATE: 1.45 G/(24.H) (ref 1–2)
CREAT CL 24H UR+SERPL-VRATE: 129 ML/MIN
CREAT CL/1.73 SQ M 24H UR+SERPL-ARVRAT: 111 ML/MIN/1.7M2 (ref 110–180)
CREAT SERPL-MCNC: 0.78 MG/DL (ref 0.66–1.25)
CREAT UR-MCNC: 130 MG/DL
DIFFERENTIAL METHOD BLD: ABNORMAL
EOSINOPHIL # BLD AUTO: 0.1 10E9/L (ref 0–0.7)
EOSINOPHIL NFR BLD AUTO: 1.3 %
ERYTHROCYTE [DISTWIDTH] IN BLOOD BY AUTOMATED COUNT: 13.3 % (ref 10–15)
GAMMA GLOB SERPL ELPH-MCNC: 1.1 G/DL (ref 0.7–1.6)
HCT VFR BLD AUTO: 41.1 % (ref 40–53)
HEIGHT IN CM: 175 CM
HGB BLD-MCNC: 14 G/DL (ref 13.3–17.7)
IGA SERPL-MCNC: 327 MG/DL (ref 70–380)
IGE SERPL-ACNC: 6 KIU/L (ref 0–114)
IGG SERPL-MCNC: 864 MG/DL (ref 695–1620)
IGM SERPL-MCNC: 50 MG/DL (ref 60–265)
IMM GRANULOCYTES # BLD: 0 10E9/L (ref 0–0.4)
IMM GRANULOCYTES NFR BLD: 0 %
KAPPA LC UR-MCNC: 0.71 MG/DL (ref 0.33–1.94)
KAPPA LC/LAMBDA SER: 0.42 {RATIO} (ref 0.26–1.65)
LAMBDA LC SERPL-MCNC: 1.69 MG/DL (ref 0.57–2.63)
LYMPHOCYTES # BLD AUTO: 1.6 10E9/L (ref 0.8–5.3)
LYMPHOCYTES NFR BLD AUTO: 34.6 %
M PROTEIN SERPL ELPH-MCNC: 0.2 G/DL
MCH RBC QN AUTO: 34.7 PG (ref 26.5–33)
MCHC RBC AUTO-ENTMCNC: 34.1 G/DL (ref 31.5–36.5)
MCV RBC AUTO: 102 FL (ref 78–100)
MONOCYTES # BLD AUTO: 0.6 10E9/L (ref 0–1.3)
MONOCYTES NFR BLD AUTO: 12.7 %
NEUTROPHILS # BLD AUTO: 2.4 10E9/L (ref 1.6–8.3)
NEUTROPHILS NFR BLD AUTO: 50.8 %
NRBC # BLD AUTO: 0 10*3/UL
NRBC BLD AUTO-RTO: 0 /100
PLATELET # BLD AUTO: 169 10E9/L (ref 150–450)
PROT 24H UR-MRATE: 0.07 G/(24.H) (ref 0.04–0.23)
PROT PATTERN SERPL ELPH-IMP: ABNORMAL
PROT UR-MCNC: 0.07 G/L
PROT/CREAT 24H UR: 0.05 G/G CR (ref 0–0.2)
RBC # BLD AUTO: 4.04 10E12/L (ref 4.4–5.9)
SPECIMEN VOL UR: 1020 ML
WBC # BLD AUTO: 4.7 10E9/L (ref 4–11)

## 2019-01-15 PROCEDURE — 84156 ASSAY OF PROTEIN URINE: CPT

## 2019-01-15 PROCEDURE — 85025 COMPLETE CBC W/AUTO DIFF WBC: CPT | Performed by: INTERNAL MEDICINE

## 2019-01-15 PROCEDURE — 00000095 ZZHCL STATISTIC CREATININE CLEARANCE

## 2019-01-15 PROCEDURE — 93010 ELECTROCARDIOGRAM REPORT: CPT | Mod: ZP | Performed by: INTERNAL MEDICINE

## 2019-01-15 PROCEDURE — 88280 CHROMOSOME KARYOTYPE STUDY: CPT | Performed by: INTERNAL MEDICINE

## 2019-01-15 PROCEDURE — 88264 CHROMOSOME ANALYSIS 20-25: CPT | Performed by: INTERNAL MEDICINE

## 2019-01-15 PROCEDURE — 38222 DX BONE MARROW BX & ASPIR: CPT | Mod: ZF

## 2019-01-15 PROCEDURE — 84166 PROTEIN E-PHORESIS/URINE/CSF: CPT

## 2019-01-15 PROCEDURE — 40001003 ZZHCL STATISTIC FLOW INT 2-8 ABY TC 88187: Performed by: INTERNAL MEDICINE

## 2019-01-15 PROCEDURE — 88185 FLOWCYTOMETRY/TC ADD-ON: CPT | Performed by: INTERNAL MEDICINE

## 2019-01-15 PROCEDURE — 93005 ELECTROCARDIOGRAM TRACING: CPT

## 2019-01-15 PROCEDURE — 88275 CYTOGENETICS 100-300: CPT | Performed by: INTERNAL MEDICINE

## 2019-01-15 PROCEDURE — 86335 IMMUNFIX E-PHORSIS/URINE/CSF: CPT

## 2019-01-15 PROCEDURE — 88271 CYTOGENETICS DNA PROBE: CPT | Performed by: INTERNAL MEDICINE

## 2019-01-15 PROCEDURE — 81261 IGH GENE REARRANGE AMP METH: CPT | Performed by: INTERNAL MEDICINE

## 2019-01-15 PROCEDURE — 88184 FLOWCYTOMETRY/ TC 1 MARKER: CPT | Performed by: INTERNAL MEDICINE

## 2019-01-15 PROCEDURE — 88237 TISSUE CULTURE BONE MARROW: CPT | Performed by: INTERNAL MEDICINE

## 2019-01-15 ASSESSMENT — PAIN SCALES - GENERAL: PAINLEVEL: NO PAIN (0)

## 2019-01-15 NOTE — NURSING NOTE
Vascular Rooming Note    Angel Yanez's goals for this visit include:   Chief Complaint   Patient presents with     Consult     Guille is being seen today for a line consultation BMT, no other concerns at this time.     Adriana Allan LPN

## 2019-01-15 NOTE — NURSING NOTE
"Oncology Rooming Note    January 15, 2019 1:27 PM   Anegl Yanez is a 58 year old male who presents for:    No chief complaint on file.    Initial Vitals: There were no vitals taken for this visit. Estimated body mass index is 27.47 kg/m  as calculated from the following:    Height as of 1/14/19: 1.753 m (5' 9\").    Weight as of 1/14/19: 84.4 kg (186 lb). There is no height or weight on file to calculate BSA.  Data Unavailable Comment: Data Unavailable   No LMP for male patient.  Allergies reviewed: Yes  Medications reviewed: Yes    Medications: Medication refills not needed today.  Pharmacy name entered into EPIC:    [facility] - [place], [place] - [address] [facility] [place] [place] [facility] [address] [facility] [facility] [address]    Clinical concerns: no concerns today, states does not want any versed for procedure     0 minutes for nursing intake (face to face time)     Ninfa Webster RN                "

## 2019-01-15 NOTE — TELEPHONE ENCOUNTER
FUTURE VISIT INFORMATION:    Date: [id number]    Time: 1000    Location: [facility]  REFERRAL INFORMATION:    Referring provider:  Dr. Adebayo Lucio     Referring providers clinic:   BMT    Reason for visit/diagnosis:  Cardiac Clearance for BMT    All records in epic.

## 2019-01-15 NOTE — PROGRESS NOTES
First Name: Angel  Age: 58 year old   Referring Physician: Dr. Lucio   REASON FOR REFERRAL: Double lumen large bore tunneled central line placement   Patient is undergoing w/u for autologous BMT, using his own stem cells as the donor     HPI: Renata Ambriz is a pleasant 58 year old male in no acute distress. Mr Yanez was diagnosed in 2004 with multiple myeloma he under went an autologous transplant at the [facility] in February 2005 leading to complete remission,for a long period of time.March 2018 recurrence of multiple myeloma was once again diagnosed. Angel is here today to discuss central line placement for stem cell transplant.     PAST MEDICAL HISTORY:   Past Medical History:   Diagnosis Date     Arthritis      Cancer (H)      Skin disease      PAST SURGICAL HISTORY: History reviewed. No pertinent surgical history.  FAMILY HISTORY:   Family History   Problem Relation Age of Onset     Diabetes Mother      SOCIAL HISTORY:   Social History     Tobacco Use     Smoking status: Former Smoker     Smokeless tobacco: Never Used   Substance Use Topics     Alcohol use: Yes     Comment: occasionaly     PROBLEM LIST:   Patient Active Problem List    Diagnosis Date Noted     Multiple myeloma in remission (H) 11/02/2018     Priority: Medium     MEDICATIONS:   Prescription Medications as of 1/15/2019       Rx Number Disp Refills Start End Last Dispensed Date Next Fill Date Owning Pharmacy    acyclovir (ZOVIRAX) 400 MG tablet   11 9/16/2018        Sig: Take 400 mg by mouth every 12 hours     Class: Historical    Route: Oral    calcipotriene (DOVONOX) 0.005 % SOLN solution   1 12/20/2017        Sig: Apply topically 2 times daily as needed     Class: Historical    Route: Topical    calcium carbonate (CALCIUM CARBONATE) 600 MG tablet            Sig: Take 2 tablets by mouth daily     Class: Historical    Route: Oral    Cholecalciferol (VITAMIN D3) 2000 units CAPS            Sig: Take 2,000 Units by mouth  daily     Class: Historical    Route: Oral    Glucosamine-Chondroit-Vit C-Mn (GLUCOSAMINE-CHONDROITIN) TABS    1/1/2001        Sig: Take 1 tablet by mouth daily     Class: Historical    Route: Oral    omeprazole (PRILOSEC OTC) 20 MG tablet    4/7/2018        Sig: Take 20 mg by mouth daily     Class: Historical    Route: Oral    prednisoLONE acetate (PRED FORTE) 1 % ophthalmic susp    12/9/2015        Sig: Place 1-2 drops into both eyes every 4 hours as needed for dry eyes     Class: Historical    Route: Both Eyes    amoxicillin (AMOXIL) 500 MG capsule   1 12/30/2017        Sig: Take 500 mg by mouth once as needed (Take Prior To Dental Procedures)     Class: Historical    Route: Oral    aspirin 325 MG tablet    4/4/2018        Sig: Take 325 mg by mouth daily     Class: Historical    Route: Oral      Hospital Medications as of 1/15/2019       Dose Frequency Start End    technetium Tc99M labeled red blood cells (UltraTag RBC) radioisotope injection 20-30 millicurie 20-30 millicurie ONCE 1/14/2019 1/14/2019    Sig: Inject 20-30 millicuries into the vein once    Class: E-Prescribe    Route: Intravenous        ALLERGIES:    Allergies   Allergen Reactions     Avalide Hives     Per 10-8-15 H&P by Ayana Love PA-C.  Per 10-8-15 H&P by Ayana Love PA-C.       Lorazepam Hives     Other reaction(s): Hives       VITALS: /87 (BP Location: Right arm, Patient Position: Chair, Cuff Size: Adult Large)   Pulse 86   SpO2 96%     ROS:  Skin: negative  Musculoskeletal: negative    Physical Examination:   Constitutional: healthy, alert and no distress  Neck: Neck supple. No adenopathy  Musculoskeletal: extremities normal- no gross deformities noted, gait normal and normal muscle tone  Skin: no suspicious lesions or rashes  Neurologic: Gait normal.  Psychiatric: affect normal/bright and mentation appears normal.    RESULTS:  BMP RESULTS:  Lab Results   Component Value Date     01/14/2019    POTASSIUM 3.9 01/14/2019     CHLORIDE 106 01/14/2019    CO2 24 01/14/2019    ANIONGAP 6 01/14/2019     (H) 01/14/2019    BUN 22 01/14/2019    CR 0.78 01/14/2019    GFRESTIMATED >90 01/14/2019    GFRESTBLACK >90 01/14/2019    ZHEN 8.7 01/14/2019        CBC RESULTS:  Lab Results   Component Value Date    WBC 3.6 (L) 01/14/2019    RBC 4.02 (L) 01/14/2019    HGB 14.1 01/14/2019    HCT 40.8 01/14/2019     (H) 01/14/2019    MCH 35.6 (H) 01/14/2019    MCHC 35.0 01/14/2019    RDW 13.4 01/14/2019     (L) 01/14/2019       INR/PTT:  Lab Results   Component Value Date    INR 0.99 01/14/2019    PTT 33 01/14/2019         ASSESSMENT/PLAN:  Type of catheter: Large bore double lumen tunneled apheresis capable catheter   Prior right tunneled central line removed feb 2005 for infection Preferred Location: Right  Internal Jugular Vein     Platelet count is   Lab Results   Component Value Date     01/14/2019    , and coagulation factors are   Lab Results   Component Value Date    INR 0.99 01/14/2019    ,  Lab Results   Component Value Date    PTT 33 01/14/2019   . The patient is at low risk for bleeding during the procedure.    PROVIDER NOTE:  The tunneled catheter placement procedure and its risks including but not limited to bleeding, infection, fibrin sheath formation and blood clots was explained to Angel.    CONSENT: Affirmation of informed written consent was not obtained.     INSTRUCTIONS/GUIDELINES:   Eating and drinking restriction guidelines were reviewed., Anti-bacterial scrub was given and instructions for its use were reviewed to decrease the risk of infection on the day of the procedure., I explained the expected length of time the tunneled catheter could remain in place., I explained that when they went to the [facility] they would be taught about exit site dressing care, flushing of the lumens and how to care for the catheter when bathing., I explained that when the port a cath is now in use that it will  need to be flushed once a month., The role of Interventional Radiology was reviewed. and The principles of infection control were reviewed.     10 minutes face to face time spent with patient.  I agree with the note.    CC  Patient Care Team:  [facility] as PCP - Anuel Mcdowell MD as BMT Physician (Hematology & Oncology)  OSCAR CORBETT

## 2019-01-15 NOTE — LETTER
1/15/2019       RE: Angel Yanez  [address]  [facility] [address]     Dear Colleague,    Thank you for referring your patient, Angel Yanez, to the [facility] VASCULAR [facility] at [facility]. Please see a copy of my visit note below.    First Name: Angel  Age: 58 year old   Referring Physician: Dr. Lucio   REASON FOR REFERRAL: Double lumen large bore tunneled central line placement   Patient is undergoing w/u for autologous BMT, using his own stem cells as the donor     HPI: Renata Ambriz is a pleasant 58 year old male in no acute distress. Mr Yanez was diagnosed in 2004 with multiple myeloma he under went an autologous transplant at the [facility] in February 2005 leading to complete remission,for a long period of time.March 2018 recurrence of multiple myeloma was once again diagnosed. Angel is here today to discuss central line placement for stem cell transplant.     PAST MEDICAL HISTORY:   Past Medical History:   Diagnosis Date     Arthritis      Cancer (H)      Skin disease      PAST SURGICAL HISTORY: History reviewed. No pertinent surgical history.  FAMILY HISTORY:   Family History   Problem Relation Age of Onset     Diabetes Mother      SOCIAL HISTORY:   Social History     Tobacco Use     Smoking status: Former Smoker     Smokeless tobacco: Never Used   Substance Use Topics     Alcohol use: Yes     Comment: occasionaly     PROBLEM LIST:   Patient Active Problem List    Diagnosis Date Noted     Multiple myeloma in remission (H) 11/02/2018     Priority: Medium     MEDICATIONS:   Prescription Medications as of 1/15/2019       Rx Number Disp Refills Start End Last Dispensed Date Next Fill Date Owning Pharmacy    acyclovir (ZOVIRAX) 400 MG tablet   11 9/16/2018        Sig: Take 400 mg by mouth every 12 hours     Class: Historical    Route: Oral    calcipotriene (DOVONOX) 0.005 % SOLN solution   1 12/20/2017        Sig: Apply topically 2  times daily as needed     Class: Historical    Route: Topical    calcium carbonate (CALCIUM CARBONATE) 600 MG tablet            Sig: Take 2 tablets by mouth daily     Class: Historical    Route: Oral    Cholecalciferol (VITAMIN D3) 2000 units CAPS            Sig: Take 2,000 Units by mouth daily     Class: Historical    Route: Oral    Glucosamine-Chondroit-Vit C-Mn (GLUCOSAMINE-CHONDROITIN) TABS    1/1/2001        Sig: Take 1 tablet by mouth daily     Class: Historical    Route: Oral    omeprazole (PRILOSEC OTC) 20 MG tablet    4/7/2018        Sig: Take 20 mg by mouth daily     Class: Historical    Route: Oral    prednisoLONE acetate (PRED FORTE) 1 % ophthalmic susp    12/9/2015        Sig: Place 1-2 drops into both eyes every 4 hours as needed for dry eyes     Class: Historical    Route: Both Eyes    amoxicillin (AMOXIL) 500 MG capsule   1 12/30/2017        Sig: Take 500 mg by mouth once as needed (Take Prior To Dental Procedures)     Class: Historical    Route: Oral    aspirin 325 MG tablet    4/4/2018        Sig: Take 325 mg by mouth daily     Class: Historical    Route: Oral      Hospital Medications as of 1/15/2019       Dose Frequency Start End    technetium Tc99M labeled red blood cells (UltraTag RBC) radioisotope injection 20-30 millicurie 20-30 millicurie ONCE 1/14/2019 1/14/2019    Sig: Inject 20-30 millicuries into the vein once    Class: E-Prescribe    Route: Intravenous        ALLERGIES:    Allergies   Allergen Reactions     Avalide Hives     Per 10-8-15 H&P by Ayana Love PA-C.  Per 10-8-15 H&P by Ayana Love PA-C.       Lorazepam Hives     Other reaction(s): Hives       VITALS: /87 (BP Location: Right arm, Patient Position: Chair, Cuff Size: Adult Large)   Pulse 86   SpO2 96%     Physical Examination:   Constitutional: healthy, alert and no distress  Neck: Neck supple. No adenopathy  Musculoskeletal: extremities normal- no gross deformities noted, gait normal and normal muscle  tone  Skin: no suspicious lesions or rashes  Neurologic: Gait normal.  Psychiatric: affect normal/bright and mentation appears normal.    RESULTS:  BMP RESULTS:  Lab Results   Component Value Date     01/14/2019    POTASSIUM 3.9 01/14/2019    CHLORIDE 106 01/14/2019    CO2 24 01/14/2019    ANIONGAP 6 01/14/2019     (H) 01/14/2019    BUN 22 01/14/2019    CR 0.78 01/14/2019    GFRESTIMATED >90 01/14/2019    GFRESTBLACK >90 01/14/2019    ZHEN 8.7 01/14/2019        CBC RESULTS:  Lab Results   Component Value Date    WBC 3.6 (L) 01/14/2019    RBC 4.02 (L) 01/14/2019    HGB 14.1 01/14/2019    HCT 40.8 01/14/2019     (H) 01/14/2019    MCH 35.6 (H) 01/14/2019    MCHC 35.0 01/14/2019    RDW 13.4 01/14/2019     (L) 01/14/2019       INR/PTT:  Lab Results   Component Value Date    INR 0.99 01/14/2019    PTT 33 01/14/2019         ASSESSMENT/PLAN:  Type of catheter: Large bore double lumen tunneled apheresis capable catheter   Prior right tunneled central line removed feb 2005 for infection Preferred Location: Right  Internal Jugular Vein     Platelet count is   Lab Results   Component Value Date     01/14/2019    , and coagulation factors are   Lab Results   Component Value Date    INR 0.99 01/14/2019    ,  Lab Results   Component Value Date    PTT 33 01/14/2019   . The patient is at low risk for bleeding during the procedure.    PROVIDER NOTE:  The tunneled catheter placement procedure and its risks including but not limited to bleeding, infection, fibrin sheath formation and blood clots was explained to Angel.    CONSENT: Affirmation of informed written consent was not obtained.     INSTRUCTIONS/GUIDELINES:   Eating and drinking restriction guidelines were reviewed., Anti-bacterial scrub was given and instructions for its use were reviewed to decrease the risk of infection on the day of the procedure., I explained the expected length of time the tunneled catheter could remain in place., I  explained that when they went to the [facility] they would be taught about exit site dressing care, flushing of the lumens and how to care for the catheter when bathing., I explained that when the port a cath is now in use that it will need to be flushed once a month., The role of Interventional Radiology was reviewed. and The principles of infection control were reviewed.     10 minutes face to face time spent with patient.  I agree with the note.    CC  Patient Care Team:  [facility] as PCP - Anuel Mcdowell MD as BMT Physician (Hematology & Oncology)  OSCAR CORBETT    Again, thank you for allowing me to participate in the care of your patient.      Sincerely,    Marielena Jackson MD

## 2019-01-15 NOTE — NURSING NOTE
Patient supine for 30 minutes following biopsy. After 30 minutes, dressing clean, dry and intact. Vital signs stable. See DOC flow sheets for details. Left ambulatory.

## 2019-01-15 NOTE — PROGRESS NOTES
Pharmacy Assessment - Pre-Stem Cell Transplant    Assessments & Recommendations:  1) Pt reports history of bone pain with G-CSF use. Recommend loratadine use.  2) Patient is allergic to Lorazepam (ativan), avoid use as part of n/v protocol.  3) Patient reports h/o Shingles, consider continuing Acyclovir prophylaxis until vaccine can be administered.      History of Present Illness:  Angel Yanez is a 58 year old year old male diagnosed with MM.  He has been treated with an auto-HSCT in Feb 2005, and RVD since 4/2018.  He is now being work up for repeat Auto Stem Cell Transplant on protocol 2016-35, which utilizes Melphalan as a conditioning regimen.    Pertinent labs/tests:  Viral Testing:  CMV pending/ HSV(-/+) / EBV(+), Pt reports h/o shingles  Ejection Fraction: 43% (1/14/19)  QTc: future    Weights:   Wt Readings from Last 3 Encounters:   01/14/19 84.4 kg (186 lb)   10/22/18 84.6 kg (186 lb 9.6 oz)   Ideal body weight: 70.7 kg (155 lb 13.8 oz)  Adjusted ideal body weight: 76.2 kg (167 lb 14.7 oz)  % IBW:  119%  There is no height or weight on file to calculate BMI.    Primary BMT Physician: Dr Lucio  BMT RN Coordinator:  Su Pate    Past Medical History:  Past Medical History:   Diagnosis Date     Arthritis      Cancer (H)      Skin disease        Medication Allergies:  Allergies   Allergen Reactions     Avalide Hives     Per 10-8-15 H&P by Ayana Love PA-C.  Per 10-8-15 H&P by Ayana Love PA-C.       Lorazepam Hives     Other reaction(s): Hives         Current Medications (pre-admit):  Current Outpatient Medications   Medication Sig Dispense Refill     acyclovir (ZOVIRAX) 400 MG tablet Take 400 mg by mouth every 12 hours   11     amoxicillin (AMOXIL) 500 MG capsule Take 500 mg by mouth once as needed (Take Prior To Dental Procedures)   1     aspirin 325 MG tablet Take 325 mg by mouth daily        calcipotriene (DOVONOX) 0.005 % SOLN solution Apply topically 2 times daily as needed   1      calcium carbonate (CALCIUM CARBONATE) 600 MG tablet Take 2 tablets by mouth daily        Cholecalciferol (VITAMIN D3) 2000 units CAPS Take 2,000 Units by mouth daily        Glucosamine-Chondroit-Vit C-Mn (GLUCOSAMINE-CHONDROITIN) TABS Take 1 tablet by mouth daily        omeprazole (PRILOSEC OTC) 20 MG tablet Take 20 mg by mouth daily        prednisoLONE acetate (PRED FORTE) 1 % ophthalmic susp Place 1-2 drops into both eyes every 4 hours as needed for dry eyes          Herbal Medication/Nutritional Supplements:  Noted in medication list.     Smoking/Past Drug Use:  Non-smoker    Nausea/Vomiting, Pain, or other issues:  Patient reports no issues with nausea in the past.  Patient reports some arthritis/psoriasis pain, but it is tolerable. Has used OTC pain relievers with relief in past. Suggest that patient use acetaminophen if medication is needed during acute transplant phase and to avoid NSAID use.    Summary:  I met with Angel Yanez and discussed his current and upcoming transplant medication list including the conditioning chemotherapy, antiemetics, prophylactic antibiotics and vaccinations. Angel Yanez participated in our conversation and voiced his understanding of the topics discussed. Thank you for allowing me to participate in the care of this patient.    Nadia Galo, TeodoroD

## 2019-01-15 NOTE — PROGRESS NOTES
BMT ONC Adult Bone Marrow Biopsy Procedure Note  January 15, 2019  BP (!) 144/93 (BP Location: Right arm, Patient Position: Sitting, Cuff Size: Adult Regular)   Pulse 61   Temp 97.7  F (36.5  C) (Oral)   Resp 16   Wt 84.5 kg (186 lb 3.2 oz)   SpO2 96%   BMI 27.50 kg/m       Learning needs assessment complete within 12 months? YES    DIAGNOSIS: MM     PROCEDURE: Unilateral Bone Marrow Biopsy and Unilateral Aspirate    [place] [facility]    Patient s identification was positively verified by verbal identification and invasive procedure safety checklist was completed. Informed consent was obtained. Following the administration of no as pre-medication, patient was placed in the prone position and prepped and draped in a sterile manner. Approximately 10 cc of 1% Lidocaine was used over the left posterior iliac spine. Following this a 3 mm incision was made. Trephine bone marrow core(s) was (were) obtained from the [facility]. Bone marrow aspirates were obtained from the [facility]. Aspirates were sent for morphology, immunophenotyping, cytogenetics and molecular diagnostics. A total of approximately 20 ml of marrow was aspirated. Following this procedure a sterile dressing was applied to the bone marrow biopsy site(s). The patient was placed in the supine position to maintain pressure on the biopsy site. Post-procedure wound care instructions were given.     Complications: NO    Pre-procedural pain: 0 out of 10 on the numeric pain rating scale.     Procedural pain: 2 out of 10 on the numeric pain rating scale.     Post-procedural pain assessment: 0 out of 10 on the numeric pain rating scale.     Interventions: NO    Length of procedure:20 minutes or less      Procedure performed by: Mariana Jolly NP

## 2019-01-15 NOTE — NURSING NOTE
BMT Teaching Flowsheet   Teaching Topic: post biopsy instructions    Person(s) involved in teaching: Patient  Motivation Level  Asks Questions: Yes  Eager to Learn: Yes  Cooperative: Yes  Receptive (willing/able to accept information): Yes    Patient demonstrates understanding of the following:   - Reason for the appointment, diagnosis and treatment plan: Yes  - Knowledge of proper use of medications and conditions for which they are ordered (with special attention to potential side effects or drug interactions): Yes  - Which situations necessitate calling provider and whom to contact: Yes    Teaching concerns addressed: reviewed activity restrictions if received premeds, potential for bleeding and actions to take if develops any of the issues below    Proper use and care of (medical equipment, care aids, etc.) Yes  Pain management techniques: Yes  Patient instructed on hand hygiene: Yes  How and/when to access community resources: Yes    Infection Control:  Patient demonstrates understanding of the following:   Surgical procedure site care taught NA  Signs and symptoms of infection taught Yes  Wound care taught Yes  Central venous catheter care taught NA    Instructional Materials Used/Given: verbal, print out of post biopsy instructions.     Time spent with patient: 0 minutes.    Specific Concerns: NA

## 2019-01-16 ENCOUNTER — HOSPITAL ENCOUNTER (OUTPATIENT)
Dept: LAB | Facility: CLINIC | Age: 59
Discharge: HOME | End: 2019-01-16
Attending: INTERNAL MEDICINE | Admitting: INTERNAL MEDICINE
Payer: COMMERCIAL

## 2019-01-16 ENCOUNTER — ANCILLARY PROCEDURE (OUTPATIENT)
Dept: CARDIOLOGY | Facility: CLINIC | Age: 59
End: 2019-01-16
Payer: COMMERCIAL

## 2019-01-16 ENCOUNTER — APPOINTMENT (OUTPATIENT)
Dept: EDUCATION SERVICES | Facility: CLINIC | Age: 59
End: 2019-01-16
Attending: INTERNAL MEDICINE
Payer: COMMERCIAL

## 2019-01-16 ENCOUNTER — OFFICE VISIT (OUTPATIENT)
Dept: TRANSPLANT | Facility: CLINIC | Age: 59
End: 2019-01-16
Attending: INTERNAL MEDICINE
Payer: COMMERCIAL

## 2019-01-16 VITALS
DIASTOLIC BLOOD PRESSURE: 89 MMHG | BODY MASS INDEX: 27.02 KG/M2 | WEIGHT: 188.71 LBS | SYSTOLIC BLOOD PRESSURE: 144 MMHG | HEIGHT: 70 IN | HEART RATE: 83 BPM | RESPIRATION RATE: 16 BRPM | TEMPERATURE: 98.2 F

## 2019-01-16 DIAGNOSIS — C90.00 MULTIPLE MYELOMA (H): ICD-10-CM

## 2019-01-16 DIAGNOSIS — C90.01 MULTIPLE MYELOMA IN REMISSION (H): ICD-10-CM

## 2019-01-16 DIAGNOSIS — Z86.2 PERSONAL HISTORY OF DISEASES OF BLOOD AND BLOOD-FORMING ORGANS: ICD-10-CM

## 2019-01-16 LAB
COPATH REPORT: NORMAL
DONOR CYTOMEGALOVIRUS ABY: POSITIVE
DONOR HEP B CORE ABY: NONREACTIVE
DONOR HEP B SURF AGN: NONREACTIVE
DONOR HEPATITIS C ABY: NONREACTIVE
DONOR HTLV 1&2 ANTIBODY: NONREACTIVE
DONOR TREPONEMA PAL ABY: NONREACTIVE
HIV1+2 AB SERPL QL IA: NONREACTIVE
INTERPRETATION ECG - MUSE: NORMAL
MPX SERIES: NONREACTIVE
PROT ELPH PNL UR ELPH: NORMAL
PROT PATTERN UR ELPH-IMP: NORMAL
T CRUZI AB SER DONR QL: NONREACTIVE
WNV RNA SPEC QL NAA+PROBE: NONREACTIVE

## 2019-01-16 PROCEDURE — G0463 HOSPITAL OUTPT CLINIC VISIT: HCPCS | Mod: ZF

## 2019-01-16 PROCEDURE — 00000345 ZZHCL STATISTIC REV BONE MARROW OUTSIDE SLIDES TC 88321: Performed by: INTERNAL MEDICINE

## 2019-01-16 ASSESSMENT — MIFFLIN-ST. JEOR: SCORE: 1667.25

## 2019-01-16 NOTE — CONSULTS
Transfusion Medicine Consultation    Angel Yanez [id number]   YOB: 1960 Age: 58 year old   Date of Consult: 1/16/2019     Reason for consult: Autologous Hematopoietic Progenitor Cell (HPC) Collection           Assessment and Plan:   58 year old male presents for consultation for autologous HPC collection for multiple myeloma.  The plan is to collect for 1 to 3 days or until the target goal is met.   A central line will be placed and will be used for access for the procedure.   He is following up with cardiology for some additional testing, had an echo today.  He does not complain of any cardiology symptoms.          Chief Complaint:   Transfusion medicine consultation.         History of Present Illness:   58 year old male presents for consultation for autologous HPC collection.  His past medical history includes multiple myeloma, originally diagnosed in 2004.  He underwent autologous transplant including peripheral blood stem cell collection in 2005.  So he understands what the MNC collection entails.  He is currently feeling well.    No significant travel history.  The patient has no identifiable risk factors for infectious disease.  The procedure, risks/benefits were discussed with the patient and all of his questions were addressed at this time.             Past Medical History:     Past Medical History:   Diagnosis Date     Arthritis      Cancer (H)      Skin disease    multiple myeloma          Past Surgical History:   Hernia repair 2015  Hip replacement 2015           Social History:     Social History     Tobacco Use     Smoking status: Former Smoker     Smokeless tobacco: Never Used   Substance Use Topics     Alcohol use: Yes     Comment: occasionaly             Family History:     Family History   Problem Relation Age of Onset     Diabetes Mother    no family history of myeloma            Allergies:     Allergies   Allergen Reactions     Avalide Hives     Per 10-8-15 H&P by Ayana  "JUAN Love  Per 10-8-15 H&P by Ayana Love PA-C.       Lorazepam Hives     Other reaction(s): Hives               Medications:     Current Outpatient Medications   Medication Sig     acyclovir (ZOVIRAX) 400 MG tablet Take 400 mg by mouth every 12 hours      calcipotriene (DOVONOX) 0.005 % SOLN solution Apply topically 2 times daily as needed      calcium carbonate (CALCIUM CARBONATE) 600 MG tablet Take 2 tablets by mouth daily      Cholecalciferol (VITAMIN D3) 2000 units CAPS Take 2,000 Units by mouth daily      Glucosamine-Chondroit-Vit C-Mn (GLUCOSAMINE-CHONDROITIN) TABS Take 1 tablet by mouth daily      omeprazole (PRILOSEC OTC) 20 MG tablet Take 20 mg by mouth daily      amoxicillin (AMOXIL) 500 MG capsule Take 500 mg by mouth once as needed (Take Prior To Dental Procedures)      prednisoLONE acetate (PRED FORTE) 1 % ophthalmic susp Place 1-2 drops into both eyes every 4 hours as needed for dry eyes      No current facility-administered medications for this encounter.              Review of Systems:   CONSTITUTIONAL:  negative for  fevers and chills  EYES:  negative for  double vision and visual disturbance  HEENT:  negative for  hearing loss, earaches, nasal congestion, epistaxis and sore throat  RESPIRATORY:  negative for  dry cough, dyspnea and hemoptysis  CARDIOVASCULAR:  negative for  chest pain, palpitations  GASTROINTESTINAL:  negative for nausea, vomiting, change in bowel habits and diarrhea  MUSCULOSKELETAL:  negative for  muscle weakness  NEUROLOGICAL:  negative for headaches, seizures and numbness    Notes some tingling in his feet           Vital Signs:   /89   Pulse 83   Temp 98.2  F (36.8  C) (Oral)   Resp 16   Ht 1.77 m (5' 9.69\")   Wt 85.6 kg (188 lb 11.4 oz)   BMI 27.32 kg/m              Data:     CBC RESULTS:   Recent Labs   Lab Test 01/15/19  1331   WBC 4.7   RBC 4.04*   HGB 14.0   HCT 41.1   *   MCH 34.7*   MCHC 34.1   RDW 13.3          Results for orders " placed or performed in visit on 19 (from the past 24 hour(s))   Echocardiogram Complete    Narrative    [id number]  [id number]  [id number]  [id number]^CHELLE^OSCAR^KYLE           [facility]  Diagnostic and Treamtent-3rd Floor  [address] [place] [address]     Name: SHIMON KNIGHT  MRN: [id number]  : 1960  Study Date: 2019 07:45 AM  Age: 58 yrs  Gender: Male  Patient Location: [facility]  Reason For Study: Multiple myeloma in remission (H)  Ordering Physician: OSCAR CORBETT  Referring Physician: OSCAR CORBETT  Performed By: LALI Davis     BSA: 2.0 m2  Height: 69 in  Weight: 186 lb  BP: 125/80 mmHg  _____________________________________________________________________________  __        Procedure  Echocardiogram with two-dimensional, color and spectral Doppler performed.  _____________________________________________________________________________  __        Interpretation Summary  Normal LV and RV size, thickness, and function, LVEF=55%.  Global peak LV longitudinal strain is averaged at -22.4%. This is within  reported normal limits (normal <-18%).  No significant valvular dysfunction.  No prior studies are available for comparison.  _____________________________________________________________________________  __        Left Ventricle  Left ventricular function, chamber size, wall motion, and wall thickness are  normal.The EF is 55-60%. Left ventricular diastolic function is normal. Global  peak LV longitudinal strain is averaged at -22.4%. This is within reported  normal limits (normal <-18%). The clinical value of assessment of global peak  longitudinal strain in the surveillance for cardiotoxicity lies in serial  measurements.     Right Ventricle  The right ventricle is normal size. Global right ventricular function is  normal.     Atria  Both atria appear normal. EDDA 18.8 ml/m^2. The atrial septum is intact  as  assessed by color Doppler .     Mitral Valve  The mitral valve is normal.        Aortic Valve  The aortic valve is not well seen but doppler interrogation shows no  significant stenosis or regurgitation.     Tricuspid Valve  The tricuspid valve is normal. Trace tricuspid insufficiency is present. The  peak velocity of the tricuspid regurgitant jet is not obtainable. Pulmonary  artery systolic pressure cannot be assessed.     Pulmonic Valve  The pulmonic valve is not well seen but doppler evaluation shows no  significant stenosis or regurgitation.     Vessels  The aorta root is normal. The pulmonary artery cannot be assessed. The  inferior vena cava was normal in size with preserved respiratory variability.  The aortic root and proximal ascending aorta are normal in size. Estimated  mean right atrial pressure is 3 mmHg (normal).     Pericardium  No pericardial effusion is present.        Compared to Previous Study  Previous study not available for comparison.     Attestation  I have personally viewed the imaging and agree with the interpretation and  report as documented by the fellow, Fede Saini, and/or edited by me.  _____________________________________________________________________________  __     MMode/2D Measurements & Calculations  IVSd: 0.82 cm  LVIDd: 4.4 cm  LVIDs: 3.0 cm  LVPWd: 0.89 cm  FS: 30.9 %  LV mass(C)d: 118.7 grams  LV mass(C)dI: 59.3 grams/m2  Ao root diam: 3.2 cm  asc Aorta Diam: 3.2 cm  LVOT diam: 2.1 cm  LVOT area: 3.4 cm2  RWT: 0.40        Doppler Measurements & Calculations  MV E max krystian: 55.3 cm/sec  MV A max krystian: 78.1 cm/sec  MV E/A: 0.71  MV dec time: 0.19 sec  PA V2 max: 99.1 cm/sec  PA max PG: 3.9 mmHg  PA acc time: 0.14 sec     E/E' av.4  Lateral E/e': 7.5  Medial E/e': 11.3     QLAB 2DQ/CMQ  10_EDV(AP2)(aCMQ): 79.4 ml  10_ESV(AP2)(aCMQ): 34.0 ml  10_EDV(AP4)(aCMQ): 72.7 ml  10_ESV(AP4)(aCMQ): 31.8 ml  10_EDV(Bi-Plane)(aCMQ): 77.6 ml  10_EF(Bi-Plane)(aCMQ): 55.1  %  10_EF(AP2)(aCMQ): 57.2 %  10_EF(AP4)(aCMQ): 56.3 %  10_ESV(Bi-Plane)(aCMQ): 34.8 ml        QLAB 3DQ Advanced  Stroke Vol: 48.1 ml  10_EDV(3DQA): 82.4 ml  10_ESV(3DQA): 34.4 ml  10_EF(3DQA): 58.3 %  10_Tmsv 3-6: -8.0 msec  10_Tmsv 3-5: -2.0 msec  10_Tmsv 3-6 (%): -1.1 %  10_Tmsv 3-5 (%): -0.33 %  _____________________________________________________________________________  __           Report approved by: Lizandro Fernando 01/16/2019 08:42 AM                Dusty Lunsford MD  Transfusion Medicine Attending  Laboratory Medicine and Pathology  Pager [phone]

## 2019-01-16 NOTE — CONSULTS
"APHERESIS INITIAL CONSULT CHECKLIST    Current Encounter Information  Current Encounter Information: Reason for Visit, Allergies and Current Meds  Procedure Requested: MNC/PBSC Collection  History of: (Reason for Apheresis): MM    Access Assessment  Access Assessment  Catheter Assessment: bmt will schedule for cvc line placement    Vital Signs  Vital Signs  BP: 144/89  Pulse: 83  Temp: 98.2  F (36.8  C)  Temp src: Oral  Resp: 16  Height: 177 cm (5' 9.69\")  Weight: 85.6 kg (188 lb 11.4 oz)    Reviewed   Review With Patient  Have you read the brochure Getting ready for Apheresis?: Yes  Have you had any invasive procedures, surgery, biopsy, bleeding in the last month?: Yes(bone marrow biopsy 1/15/19)  Transfusion medicine physician informed: Yes  Review medications and allergies: (allergy to avelide and lorazepam)  Have you ever been transfused?: Yes  Do you require pre-medication for blood products?: No  Patient given tour of the unit: Yes  Photophoresis: sun precautions reviewed with patient: N/A    Additional Information  Notes, needs and time spent with patient  Explain procedure, side effects or reactions, instructions: Yes  Patient has special need?: No  Time spent: 30mins face to face     Seen pt here for consult for future stem cell collection. Pt reported auto-BMT transplant 14 years ago, at this facility. He reported having allergy to avelide and lorazepam. VSS,meds reviewed. Procedure explained with emphasis on low fat diet, hydration, comfort and side effects. Questions answered. BMT will schedule for line placement.     "

## 2019-01-17 ENCOUNTER — OFFICE VISIT (OUTPATIENT)
Dept: CARDIOLOGY | Facility: CLINIC | Age: 59
End: 2019-01-17
Attending: INTERNAL MEDICINE
Payer: COMMERCIAL

## 2019-01-17 ENCOUNTER — PRE VISIT (OUTPATIENT)
Dept: CARDIOLOGY | Facility: CLINIC | Age: 59
End: 2019-01-17

## 2019-01-17 VITALS
SYSTOLIC BLOOD PRESSURE: 155 MMHG | WEIGHT: 182.1 LBS | BODY MASS INDEX: 26.97 KG/M2 | OXYGEN SATURATION: 99 % | HEART RATE: 76 BPM | DIASTOLIC BLOOD PRESSURE: 89 MMHG | HEIGHT: 69 IN

## 2019-01-17 DIAGNOSIS — Z01.810 PRE-OPERATIVE CARDIOVASCULAR EXAMINATION: Primary | ICD-10-CM

## 2019-01-17 DIAGNOSIS — C90.02 MULTIPLE MYELOMA IN RELAPSE (H): ICD-10-CM

## 2019-01-17 LAB
COPATH REPORT: NORMAL
COPATH REPORT: NORMAL

## 2019-01-17 PROCEDURE — 99204 OFFICE O/P NEW MOD 45 MIN: CPT | Mod: ZP | Performed by: INTERNAL MEDICINE

## 2019-01-17 PROCEDURE — G0463 HOSPITAL OUTPT CLINIC VISIT: HCPCS | Mod: ZF

## 2019-01-17 ASSESSMENT — PAIN SCALES - GENERAL: PAINLEVEL: NO PAIN (0)

## 2019-01-17 ASSESSMENT — MIFFLIN-ST. JEOR: SCORE: 1626.38

## 2019-01-17 NOTE — LETTER
2019      RE: Angel Yanez  [address]  [facility] [address]       Dear Colleague,    Thank you for the opportunity to participate in the care of your patient, Angel Yanez, at the [facility] at [facility]. Please see a copy of my visit note below.    I am delighted to see Angel Yanez in consultation for cardiovascular assessment prior to bone marrow transplant.     History of Present Illness:  As you know, the patient is a 58 year old  Male with multiple myeloma diagnosed , treated with auto-HSCT in 2005, RVD since 2018, completed 18. Now being evaluated for auto stem cell transplant. 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 He lives with his wife, has no activity restrictions, did a 150-mile bike ride over 2 days last year. Denies chest discomfort, palpitations, dizziness, orthopnea.    The following portions of the patient's history were reviewed and updated as appropriate: allergies, current medications, past family history, past medical history, past social history, past surgical history, and the problem list.    Past Medical History:  Multiple myeloma diagnosed , auto stem cell transplat , RVD April-Dec 2018.  Hip replacement   Hernia 2015  Psoriatic arthritis  GERD    Medications:   Acyclovir  Calcium  Vitamin D  Omeprazole    Allergies:    Allergies   Allergen Reactions     Avalide Hives     Per 10-8-15 H&P by Ayana Love PA-C.  Per 10-8-15 H&P by Ayana Love PA-C.       Lorazepam Hives     Other reaction(s): Hives         Family History:   Mother  brainstem stroke 78  Father  leukemia 65    Family History   Problem Relation Age of Onset     Diabetes Mother        Psychosocial history:  reports that he has quit smoking. he has never used smokeless tobacco. He reports that he drinks alcohol. He reports that he does not  "use drugs. Quit smoking 35 years ago, drink 1-2 / week.  No recreational drugs.    Review of systems:   Cardiovascular: No palpitations, chest pain, shortness of breath at rest, dyspnea with exertion, orthopnea, paroxysmal nocturia dyspnea, nocturia, dizziness, syncope.    In addition,   Constitutional: No change in weight, sleep or appetite.  Normal energy.  No fever or chills  Eyes: Negative for vision changes or eye problems  ENT: No problems with ears, nose or throat.  No difficulty swallowing.  Resp: No coughing, wheezing or shortness of breath  GI: No nausea, vomiting,  heartburn, abdominal pain, diarrhea, constipation or change in bowel habits  : No urinary frequency or dysuria, bladder or kidney problems  Musculoskeletal: No significant muscle or joint pains  Neurologic: No headaches, numbness, tingling, weakness, problems with balance or coordination  Psychiatric: No problems with anxiety, depression or mental health  Heme/immune/allergy: No history of bleeding or clotting problems or anemia.  No allergies or immune system problems  Integumentary: No rashes,worrisome lesions or skin problems    Physical examination  Vitals: /89 (BP Location: Left arm, Patient Position: Chair, Cuff Size: Adult Large)   Pulse 76   Ht 1.753 m (5' 9\")   Wt 82.6 kg (182 lb 1.6 oz)   SpO2 99%   BMI 26.89 kg/m     BMI= Body mass index is 26.89 kg/m .    Constitutional: In general, the patient is a pleasant male in no apparent distress.    Eyes: PERRLA.  EOMI.  Sclerae white, not injected.  ENT/mouth: Normiocephalic and atraumatic.  Nares clear.  Pharynx without erythema or exudate.  Dentition intact.  No adenopathy.  No thyromegaly. Carotids +2/2 bilaterally without bruits.  No jugular venous distension.   Card/Vasc: The PMI is in the 5th ICS in the midclavicular line. There is no heave. Regular rate and rhythm. Normal S1, S2. No murmur, rub, click, or gallop. Pulses are normal bilaterally throughout. No peripheral " edema.  Respiratory: Clear to asculation.  No ronchi, wheezes, rales.  No dullness to percussion.   GI: Abdomen is soft, nontender, nondistended. No organomegaly. No AAA.  No bruits.   Integument: No significant bruises or rashes  Neurological: The neurological examination reveal a patient who was oriented to person, place, and time.    Psych: Normal  Heme/Lymph/Immun: no significant adenopathy    I have reviewed the following labs/imaging:  Labs 1/15/19: cr 0.78, k 3.9, hgb 14, plt 169K, INR 0.99, WBC 4.7  Echo 1/16/19: EF 55%, no valve disease, normal atria, normal RV  MUGA 1/14/19: EF 43%  CT calcium score 4/27/17: zero    I have personally and independently reviewed the following:  EKG 1/15/19: sinus 80 bpm, normal intervals    Assessment :  1. Cardiovascular assessement.   No prior history, no cardiovascular symptoms, no cardiac risk factors, coronary CT calcium score of zero. MUGA showed EF 43% by 2D echo was normal, without valvular disease and with normal chamber sizes. He is at low risk for major cardiovascular events for BMT. Discussed with him the possibility of developing cardiomyopathy with cytoxan/BMT, can follow with echo.  2. Blood pressure. No h/o hypertension. BP 2 days was normal. Follow for development of HTN in future.        Plan:  Low cardiovascular risk for BMT  Can f/u echo post BMT/chemo completion.      I spent a total of 30 minutes face to face with  Angel Yanez during today's office visit. Over 50% of this time was spent counseling the patient and/or coordinating care regarding cardiovascular symptoms.      The patient is to return as needed post BMT. The patient understood the treatment plan as outlined above.  There were no barriers to learning.      Tena Jaime MD

## 2019-01-17 NOTE — NURSING NOTE
Chief Complaint   Patient presents with     New Patient     57 yo male present for BMT Cardiac Clearance      Vitals were taken and medications were reconciled.    Alejandra Slater CMA    9:58 AM

## 2019-01-17 NOTE — PATIENT INSTRUCTIONS
"You were seen today in the Cardiovascular Clinic at the [facility].     Cardiology Providers you saw during your visit: Dr. Tena Jaime    Diagnosis:  Multiple myeloma    Results: discussed with patient    Orders:   None    Medication Changes:   None    Recommendations:   None    Follow-up:   As needed         Please feel free to call me with any questions or concerns.       Samira Dallas LPN     Questions and [phone].   First press #1 for the [employer] and then press #3 for \"Medical Questions\" to reach us Cardiology Nurses.      On Call Cardiologist for after hours or on weekends: [phone]   option #4 and ask to speak to the on-call Cardiologist.          If you need a medication refill please contact your pharmacy.  Please allow 3 business days for your refill to be completed.    "

## 2019-01-17 NOTE — PROGRESS NOTES
I am delighted to see Angel Yanez in consultation for cardiovascular assessment prior to bone marrow transplant.     History of Present Illness:  As you know, the patient is a 58 year old  Male with multiple myeloma diagnosed , treated with auto-HSCT in 2005, RVD since 2018, completed 18. Now being evaluated for auto stem cell transplant. 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 He lives with his wife, has no activity restrictions, did a 150-mile bike ride over 2 days last year. Denies chest discomfort, palpitations, dizziness, orthopnea.    The following portions of the patient's history were reviewed and updated as appropriate: allergies, current medications, past family history, past medical history, past social history, past surgical history, and the problem list.    Past Medical History:  Multiple myeloma diagnosed , auto stem cell transplat , RVD April-Dec 2018.  Hip replacement   Hernia   Psoriatic arthritis  GERD    Medications:   Acyclovir  Calcium  Vitamin D  Omeprazole    Allergies:    Allergies   Allergen Reactions     Avalide Hives     Per 10-8-15 H&P by Ayana Love PA-C.  Per 10-8-15 H&P by Ayana Love PA-C.       Lorazepam Hives     Other reaction(s): Hives         Family History:   Mother  brainstem stroke 78  Father  leukemia 65    Family History   Problem Relation Age of Onset     Diabetes Mother        Psychosocial history:  reports that he has quit smoking. he has never used smokeless tobacco. He reports that he drinks alcohol. He reports that he does not use drugs. Quit smoking 35 years ago, drink 1-2 / week.  No recreational drugs.    Review of systems:   Cardiovascular: No palpitations, chest pain, shortness of breath at rest, dyspnea with exertion, orthopnea, paroxysmal nocturia dyspnea, nocturia, dizziness, syncope.    In addition,   Constitutional: No change in  "weight, sleep or appetite.  Normal energy.  No fever or chills  Eyes: Negative for vision changes or eye problems  ENT: No problems with ears, nose or throat.  No difficulty swallowing.  Resp: No coughing, wheezing or shortness of breath  GI: No nausea, vomiting,  heartburn, abdominal pain, diarrhea, constipation or change in bowel habits  : No urinary frequency or dysuria, bladder or kidney problems  Musculoskeletal: No significant muscle or joint pains  Neurologic: No headaches, numbness, tingling, weakness, problems with balance or coordination  Psychiatric: No problems with anxiety, depression or mental health  Heme/immune/allergy: No history of bleeding or clotting problems or anemia.  No allergies or immune system problems  Integumentary: No rashes,worrisome lesions or skin problems    Physical examination  Vitals: /89 (BP Location: Left arm, Patient Position: Chair, Cuff Size: Adult Large)   Pulse 76   Ht 1.753 m (5' 9\")   Wt 82.6 kg (182 lb 1.6 oz)   SpO2 99%   BMI 26.89 kg/m    BMI= Body mass index is 26.89 kg/m .    Constitutional: In general, the patient is a pleasant male in no apparent distress.    Eyes: PERRLA.  EOMI.  Sclerae white, not injected.  ENT/mouth: Normiocephalic and atraumatic.  Nares clear.  Pharynx without erythema or exudate.  Dentition intact.  No adenopathy.  No thyromegaly. Carotids +2/2 bilaterally without bruits.  No jugular venous distension.   Card/Vasc: The PMI is in the 5th ICS in the midclavicular line. There is no heave. Regular rate and rhythm. Normal S1, S2. No murmur, rub, click, or gallop. Pulses are normal bilaterally throughout. No peripheral edema.  Respiratory: Clear to asculation.  No ronchi, wheezes, rales.  No dullness to percussion.   GI: Abdomen is soft, nontender, nondistended. No organomegaly. No AAA.  No bruits.   Integument: No significant bruises or rashes  Neurological: The neurological examination reveal a patient who was oriented to person, " place, and time.    Psych: Normal  Heme/Lymph/Immun: no significant adenopathy    I have reviewed the following labs/imaging:  Labs 1/15/19: cr 0.78, k 3.9, hgb 14, plt 169K, INR 0.99, WBC 4.7  Echo 1/16/19: EF 55%, no valve disease, normal atria, normal RV  MUGA 1/14/19: EF 43%  CT calcium score 4/27/17: zero    I have personally and independently reviewed the following:  EKG 1/15/19: sinus 80 bpm, normal intervals    Assessment :  1. Cardiovascular assessement.   No prior history, no cardiovascular symptoms, no cardiac risk factors, coronary CT calcium score of zero. MUGA showed EF 43% by 2D echo was normal, without valvular disease and with normal chamber sizes. He is at low risk for major cardiovascular events for BMT. Discussed with him the possibility of developing cardiomyopathy with cytoxan/BMT, can follow with echo.  2. Blood pressure. No h/o hypertension. BP 2 days was normal. Follow for development of HTN in future.        Plan:  Low cardiovascular risk for BMT  Can f/u echo post BMT/chemo completion.      I spent a total of 30 minutes face to face with  Angel Yanez during today's office visit. Over 50% of this time was spent counseling the patient and/or coordinating care regarding cardiovascular symptoms.      The patient is to return as needed post BMT. The patient understood the treatment plan as outlined above.  There were no barriers to learning.      Tena Jaime MD

## 2019-01-18 ENCOUNTER — OFFICE VISIT (OUTPATIENT)
Dept: TRANSPLANT | Facility: CLINIC | Age: 59
End: 2019-01-18
Attending: INTERNAL MEDICINE
Payer: COMMERCIAL

## 2019-01-18 VITALS
OXYGEN SATURATION: 97 % | BODY MASS INDEX: 27.74 KG/M2 | SYSTOLIC BLOOD PRESSURE: 148 MMHG | WEIGHT: 187.83 LBS | DIASTOLIC BLOOD PRESSURE: 93 MMHG | RESPIRATION RATE: 18 BRPM | HEART RATE: 81 BPM | TEMPERATURE: 98.1 F

## 2019-01-18 DIAGNOSIS — C90.00 MULTIPLE MYELOMA NOT HAVING ACHIEVED REMISSION (H): Primary | ICD-10-CM

## 2019-01-18 DIAGNOSIS — Z86.2 PERSONAL HISTORY OF DISEASES OF BLOOD AND BLOOD-FORMING ORGANS: ICD-10-CM

## 2019-01-18 PROCEDURE — G0463 HOSPITAL OUTPT CLINIC VISIT: HCPCS | Mod: ZF

## 2019-01-18 RX ORDER — FUROSEMIDE 10 MG/ML
20 INJECTION INTRAMUSCULAR; INTRAVENOUS EVERY 8 HOURS PRN
Status: CANCELLED | OUTPATIENT
Start: 2019-01-22

## 2019-01-18 RX ORDER — ONDANSETRON 8 MG/1
8 TABLET, FILM COATED ORAL EVERY 8 HOURS
Status: CANCELLED | OUTPATIENT
Start: 2019-01-22

## 2019-01-18 RX ORDER — DEXTROSE MONOHYDRATE, SODIUM CHLORIDE, AND POTASSIUM CHLORIDE 50; 1.49; 4.5 G/1000ML; G/1000ML; G/1000ML
INJECTION, SOLUTION INTRAVENOUS CONTINUOUS
Status: CANCELLED
Start: 2019-01-22

## 2019-01-18 RX ORDER — FUROSEMIDE 10 MG/ML
10 INJECTION INTRAMUSCULAR; INTRAVENOUS
Status: CANCELLED | OUTPATIENT
Start: 2019-01-22

## 2019-01-18 ASSESSMENT — PAIN SCALES - GENERAL: PAINLEVEL: MILD PAIN (2)

## 2019-01-18 NOTE — PROGRESS NOTES
BP (!) 148/93   Pulse 81   Temp 98.1  F (36.7  C)   Resp 18   Wt 85.2 kg (187 lb 13.3 oz)   SpO2 97%   BMI 27.74 kg/m     Wt Readings from Last 4 Encounters:   01/18/19 85.2 kg (187 lb 13.3 oz)   01/17/19 82.6 kg (182 lb 1.6 oz)   01/16/19 85.6 kg (188 lb 11.4 oz)   01/15/19 84.5 kg (186 lb 3.2 oz)   10/22/18          84.6 kg    There is a formal pre-autologous transplant evaluation for this 58-year-old man with chemotherapy sensitive recurrent myeloma.  Extensive detail of his history was outlined in my note from October 22, 2018  and his outside oncology records as well supplemented by the patient's description of his course.    Briefly he was diagnosed with IgA kappa myeloma in 2004 with 6.7 g M protein treated with Thal/Dex and then autologous transplantation in February 2005. His autologous transplantation in 2005 including high-dose melphalan and records appear that he was CMV seronegative at that point      He achieved complete remission which lasted until 2016 when he had slow progressive increase in his monoclonal protein.  By March 2018 it was 0.9 g and his bone marrow had 30-40% plasma cells with standard cytogenetics including deletion 13.  He had no bone lesions.    He was treated with RVD from April 2018 through December.  He has had a good response down to minimal disease burden.  He has had no need for transfusions, bone pain, paresthesias, neurologic symptoms or other complications of his therapy this year.    His other past history includes psoriasis and psoriatic arthritis plus hyperlipidemia he does not smoke or drink alcohol and has no other complicating interim problems.    His recent review of systems is quite negative.  His had no recent infections and has not lost much time from work during his recent therapy.  When he discontinued a number of his medications his arthritis is been a little more active but modestly so.  He has had no active GI  respiratory cardiac or ENT symptoms and  only limited skin rash from his psoriasis.    His exam shows his weight is steady over the last 3 months and his vital signs are acceptable.  He had no cervical supraclavicular axillary or inguinal lymphadenopathy.  His oropharynx is clear without mucosal lesions.  His lungs are clear.  His heart tones are regular.  He had no gallop rhythm or heart murmur.  His abdomen was soft and nontender without hepatosplenomegaly or masses.  There are no focal areas of bone tenderness.  Neurologic exam showed intact cranial nerves; 1+ deep tendon reflexes and heel toe Romberg finger-nose and rapid alternating movement testing was negative.    We had an extended (55 minute) consultation, nearly all in counseling reviewing the results of his pretransplant workup and the plan for his therapy.  There were no contraindications to proceeding with this second autologous transplant identified.    His blood counts and chemistries were good; his IgA was down to 327 with 0.2g/dl  M protein, both of which are improved from his last outside measurements in November.  His free light chains were in the normal range but immunofixation demonstrated monoclonal IgA kappa and small amounts in the serum.    His MUGA measured 43% LV ejection fraction but echocardiogram was 55% with an EKG in normal sinus rhythm.  A cardiology consultation identified no cardiac compromise.  He was CMV seropositive (different than in 2004) and HSV seropositive and EBV seropositive as well.  His other tested serologies were negative.  He had no bone lesions on bone survey.  His pulmonary function testing was acceptable and a bone marrow biopsy was 10-20% cellular with 5% plasma cells.    We reviewed the rationale for a 2nd autotransplant after his long remission and his chemotherapy sensitive state at this time.  We reviewed that he would again receive high-dose melphalan with aggressive antiemetics, fluids, allopurinol, ursodiol, prophylactic antibiotics, and  transfusions as needed to protect him during the period of pancytopenia.  We reviewed the risks of mucositis, GI upset, venoocclusive disease of the liver, pneumonitis, infections and bleeding.    We also discussed possible late effects of hypothyroidism, cataracts,sterility,hypogonadism, secondary marrow injury with myelodysplastic syndrome or treatment associated leukemia developing a small fraction of patients.    I did emphasize that posttransplant maintenance therapy would be again indicated with hopes that would reestablish a complete remission for an extended period of time.    We will collect his stem cells using cyclophosphamide for mobilization supplemented with filgrastim and Plerixafor and if a satisfactory graft is collected and cryopreserved we can then proceed with his auto transplantation.    His questions were answered in full and he is willing and consented today to proceed with autologous transplantation as well as to submit data to the [facility], to receive blood transfusions, to participate in a trial of intercept platelets for pathogen inactivation, to receive a CMV triplex vaccine.    He is an excellent candidate for this therapy and will hopefully begin his autograft stem cell mobilization early next week     In today's visit, we reviewed with the patient the protocols that Angel Yanez is eligible for. We discussed the potential risks and potential benefits of each protocol, individually. We explained potential alternatives to the proposed treatment. We explained to the patient that participation is voluntary and that consent may be withdrawn at any time. The patient had opportunity to ask questions that were answered to the best of my ability and to the patient's apparent satisfaction.    The patient's testing shows no evidence of infection that require continue management during the treatment procedures. I reviewed and discussed findings with the patient and the management plan during  treatment is outlined above    The patient completed the last round on treatment on Dec 25.    The patient's Karnofsky performance score was 90    The patient's HCT, CI, score was 4     We counseled the patient about the impact of this on the risk of treatment related and overall mortality. The score fit within treatment protocol eligibility criteria.    Link to HCT calculator: [email or web address]      The patient received necessary reproductive counseling and the need for effective contraception during the treatment procedures.    The patient's dental health is suitable to proceed: Yes    We then proceeded with reviewing and signing consents. The patient signed for the following consents for treatment and protocols listed above.  The patient received a copy of the consents.       Adebayo Lucio MD    Professor of medicine    Results for ANTONIA SHIMON C (MRN [id number]) as of 1/18/2019 16:10   Ref. Range 1/14/2019 09:45 1/14/2019 09:48 1/14/2019 10:12 1/14/2019 10:29 1/14/2019 11:58 1/14/2019 11:59 1/14/2019 14:37 1/15/2019 08:00 1/15/2019 13:31 1/15/2019 14:25 1/15/2019 14:30 1/15/2019 15:53 1/16/2019 08:03 1/16/2019 12:00   Sodium Latest Ref Range: 133 - 144 mmol/L 137                Potassium Latest Ref Range: 3.4 - 5.3 mmol/L 3.9                Chloride Latest Ref Range: 94 - 109 mmol/L 106                Carbon Dioxide Latest Ref Range: 20 - 32 mmol/L 24                Urea Nitrogen Latest Ref Range: 7 - 30 mg/dL 22                Creatinine Latest Ref Range: 0.66 - 1.25 mg/dL 0.78       0.78         GFR Estimate Latest Ref Range: >60 mL/min/1.73_m2 >90                GFR Estimate If Black Latest Ref Range: >60 mL/min/1.73_m2 >90                Calcium Latest Ref Range: 8.5 - 10.1 mg/dL 8.7                Anion Gap Latest Ref Range: 3 - 14 mmol/L 6                Magnesium Latest Ref Range: 1.6 - 2.3 mg/dL 2.0                Phosphorus Latest Ref Range: 2.5 - 4.5 mg/dL 2.6                 Albumin Latest Ref Range: 3.4 - 5.0 g/dL 3.6                Protein Total Latest Ref Range: 6.8 - 8.8 g/dL 7.3                Bilirubin Total Latest Ref Range: 0.2 - 1.3 mg/dL 0.4                Alkaline Phosphatase Latest Ref Range: 40 - 150 U/L 75                ALT Latest Ref Range: 0 - 70 U/L 21                AST Latest Ref Range: 0 - 45 U/L 18                Beta-2-Microglobulin Latest Ref Range: <2.3 mg/L 2.0                CREATININE CLEARANCE Unknown        Rpt         Creatinine Urine Timed Latest Ref Range: 1.00 - 2.00         1.45         Creatinine Urine Latest Units: mg/dL        130         IGE Latest Ref Range: 0 - 114 KIU/L 6                Lactate Dehydrogenase Latest Ref Range: 85 - 227 U/L 219                Protein Random Urine Latest Units: g/L        0.07         Protein Total Urine g/gr Creatinine Latest Ref Range: 0 - 0.2 g/g Cr        0.05         Protein Total 24 Hr Urine Latest Ref Range: 0.04 - 0.23         0.07         Uric Acid Latest Ref Range: 3.5 - 7.2 mg/dL 4.2                Glucose Latest Ref Range: 70 - 99 mg/dL 116 (H)                pH Arterial Latest Ref Range: 7.35 - 7.45 pH    7.43             pCO2 Arterial Latest Ref Range: 35 - 45 mm Hg    36             PO2 Arterial Latest Ref Range: 80 - 105 mm Hg    90             Bicarbonate Arterial Latest Ref Range: 21 - 28 mmol/L    23             Base Excess Art Latest Units: mmol/L    0.0             FIO2 Unknown    21             Oxyhemoglobin Arterial Latest Ref Range: 92 - 100 %    96             Carbon Monoxide Latest Ref Range: 0 - 2 %    0.8             Methemoglobin Latest Ref Range: 0 - 3 %    0.1             WBC Latest Ref Range: 4.0 - 11.0 10e9/L 3.6 (L)        4.7        Hemoglobin Latest Ref Range: 13.3 - 17.7 g/dL 14.3   14.1     14.0        Hematocrit Latest Ref Range: 40.0 - 53.0 % 40.8        41.1        Platelet Count Latest Ref Range: 150 - 450 10e9/L 136 (L)        169        RBC Count Latest Ref  Range: 4.4 - 5.9 10e12/L 4.02 (L)        4.04 (L)        MCV Latest Ref Range: 78 - 100 fl 102 (H)        102 (H)        MCH Latest Ref Range: 26.5 - 33.0 pg 35.6 (H)        34.7 (H)        MCHC Latest Ref Range: 31.5 - 36.5 g/dL 35.0        34.1        RDW Latest Ref Range: 10.0 - 15.0 % 13.4        13.3        Diff Method Unknown Automated Method        Automated Method        % Neutrophils Latest Units: % 43.1        50.8        % Lymphocytes Latest Units: % 40.5        34.6        % Monocytes Latest Units: % 14.5        12.7        % Eosinophils Latest Units: % 1.1        1.3        % Basophils Latest Units: % 0.8        0.6        % Immature Granulocytes Latest Units: % 0.0        0.0        Nucleated RBCs Latest Ref Range: 0 /100 0        0        Absolute Neutrophil Latest Ref Range: 1.6 - 8.3 10e9/L 1.5 (L)        2.4        Absolute Lymphocytes Latest Ref Range: 0.8 - 5.3 10e9/L 1.5        1.6        Absolute Monocytes Latest Ref Range: 0.0 - 1.3 10e9/L 0.5        0.6        Absolute Eosinophils Latest Ref Range: 0.0 - 0.7 10e9/L 0.0        0.1        Absolute Basophils Latest Ref Range: 0.0 - 0.2 10e9/L 0.0        0.0        Abs Immature Granulocytes Latest Ref Range: 0 - 0.4 10e9/L 0.0        0.0        Absolute Nucleated RBC Unknown 0.0        0.0        INR Latest Ref Range: 0.86 - 1.14  0.99                PTT Latest Ref Range: 22 - 37 sec 33                ABO Unknown  O               RH(D) Unknown  Pos               Antibody Screen Unknown  Neg               Test Valid Only At Latest Units:      [facility]               Specimen Expires Unknown  01/17/2019               Color Urine Unknown  Yellow               Appearance Urine Unknown  Clear               Glucose Urine Latest Ref Range: NEG^Negative mg/dL  Negative               Bilirubin Urine Latest Ref Range: NEG^Negative   Negative               Ketones Urine Latest Ref Range: NEG^Negative mg/dL  Negative               Specific Gravity  Urine Latest Ref Range: 1.003 - 1.035   1.017               pH Urine Latest Ref Range: 5.0 - 7.0 pH  5.0               Protein Albumin Urine Latest Ref Range: NEG^Negative mg/dL  Negative               Urobilinogen mg/dL Latest Ref Range: 0.0 - 2.0 mg/dL  0.0               Nitrite Urine Latest Ref Range: NEG^Negative   Negative               Blood Urine Latest Ref Range: NEG^Negative   Negative               Leukocyte Esterase Urine Latest Ref Range: NEG^Negative   Negative               Source Unknown  Midstream Urine               WBC Urine Latest Ref Range: 0 - 5 /HPF  1               RBC Urine Latest Ref Range: 0 - 2 /HPF  <1               Mucous Urine Latest Ref Range: NEG^Negative /LPF  Present (A)               EBV Capsid Antibody IgG Latest Ref Range: 0.0 - 0.8 AI >8.0 (H)                HERPES SIMPLEX VIRUS TYPE 1 AND 2 IGG Unknown Rpt (A)                MPX Series Unknown  Nonreactive               West Nile Virus by PCR Unknown  Nonreactive               Donor Hep B Surf Agn Latest Ref Range: NR^Nonreactive   Nonreactive               Donor Hep B Core Brandon Latest Ref Range: NR^Nonreactive   Nonreactive               Donor Hepatitis C Brandon Latest Ref Range: NR^Nonreactive   Nonreactive               Donor HIV 1&2 Antibody Latest Ref Range: NR^Nonreactive   Nonreactive               Donor HTLV 1&2 Antibody Latest Ref Range: NR^Nonreactive   Nonreactive               Donor Cytomegalovirus Brandon Latest Ref Range: NR^Nonreactive   Positive (A)               Donor Treponema PAL BRANDON Unknown  Nonreactive               Trypanosoma Cruzi Latest Ref Range: NR^Nonreactive   Nonreactive               Albumin Fraction Latest Ref Range: 3.7 - 5.1 g/dL 4.3                Alpha 1 Fraction Latest Ref Range: 0.2 - 0.4 g/dL 0.3                Alpha 2 Fraction Latest Ref Range: 0.5 - 0.9 g/dL 0.7                B CELL GENE REARRANGEMENT IGH PCR Unknown           Rpt      Beta Fraction Latest Ref Range: 0.6 - 1.0 g/dL 0.7                 CHROMOSOME BONE MARROW Unknown           Rpt      ELP Interpretation: Unknown Small monoclonal ...                ELP Interpretation Urine Unknown        Only trace albumi...         FISH Unknown           Rpt      Gamma Fraction Latest Ref Range: 0.7 - 1.6 g/dL 1.1                IGA Latest Ref Range: 70 - 380 mg/dL 327                IGG Latest Ref Range: 695 - 1,620 mg/dL 864                IGM Latest Ref Range: 60 - 265 mg/dL 50 (L)                Immunofix ELP Urine Unknown        (Note)         Kappa Free Lt Chain Latest Ref Range: 0.33 - 1.94 mg/dL 0.71                Kappa Lambda Ratio Latest Ref Range: 0.26 - 1.65  0.42                Lambda Free Lt Chain Latest Ref Range: 0.57 - 2.63 mg/dL 1.69                LEUKEMIA LYMPHOMA EVALUATION (FLOW CYTOMETRY) Unknown           Rpt      Monoclonal Peak Latest Ref Range: 0.0 g/dL 0.2 (H)                BONE MARROW BIOPSY Unknown          Rpt       PATHOLOGY CONSULT Unknown              Rpt   Lab Scanned Result Unknown HEMOGLOBIN S-Scanned                XR BONE SURVEY COMPLETE Unknown     Rpt            XR CHEST 2 VW Unknown      Rpt           NM HEART MUGA REST Unknown       Rpt          FVC-Pred Latest Units: L   4.11              FVC-Pre Latest Units: L   3.92              FVC-%Pred-Pre Latest Units: %   95              FEV1-Pre Latest Units: L   3.06              FEV1-%Pred-Pre Latest Units: %   94              FEV1FVC-Pred Latest Units: %   76              FEV1FVC-Pre Latest Units: %   78              FEV1SVC-Pred Latest Units: %   67              FEV1SVC-Pre Latest Units: %   71              FEV1FEV6-Pred Latest Units: %   79              FEV1FEV6-Pre Latest Units: %   79              FEFMax-Pred Latest Units: L/sec   8.95              FEFMax-Pre Latest Units: L/sec   9.10              FEFMax-%Pred-Pre Latest Units: %   101              FIFMax-Pre Latest Units: L/sec   7.32              ExpTime-Pre Latest Units: sec   8.14              FRCPleth-Pred  Latest Units: L   3.56              FRCPleth-Pre Latest Units: L   2.73              FRCPleth-%Pred-Pre Latest Units: %   76              RVPleth-Pred Latest Units: L   2.40              RVPleth-Pre Latest Units: L   2.38              RVPleth-%Pred-Pre Latest Units: %   98              TLCPleth-Pred Latest Units: L   6.92              TLCPleth-Pre Latest Units: L   6.65              TLCPleth-%Pred-Pre Latest Units: %   96              ERV-Pred Latest Units: L   1.12              ERV-Pre Latest Units: L   0.35              ERV-%Pred-Pre Latest Units: %   31              IC-Pred Latest Units: L   3.68              IC-Pre Latest Units: L   3.92              IC-%Pred-Pre Latest Units: %   106              VC-Pred Latest Units: L   4.80              VC-Pre Latest Units: L   4.28              VC-%Pred-Pre Latest Units: %   89              DLCOunc-Pred Latest Units: ml/min/mmHg   27.87              DLCOunc-Pre Latest Units: ml/min/mmHg   26.11              DLCOunc-%Pred-Pre Latest Units: %   93              DLCOcor-Pre Latest Units: ml/min/mmHg   26.70              DLCOcor-%Pred-Pre Latest Units: %   95              VA-Pre Latest Units: L   5.80              VA-%Pred-Pre Latest Units: %   87              ECHOCARDIOGRAM COMPLETE Unknown             Rpt    EKG 12-LEAD COMPLETE W/READ - CLINICS Unknown            Rpt     PFT GENERAL LAB TESTING Unknown   Rpt              ZRI9615-%Pred-Pre Latest Units: %   97              FEF2575-Pre Latest Units: L/sec   2.67              FEF2575-Pred Latest Units: L/sec   2.73              FIO2-Pre Latest Units: %   21.00              Patient Name: SHIMON KNIGHT   MR#: [id number]   Specimen #: [id number]   Collected: 1/15/2019   Received: 1/15/2019   Reported: 1/17/2019 10:51   Ordering Phy(s): KAILYN MUNOZ   Additional Phy(s): OSCAR WRIGHT [facility]   Copy to Cytogenetics     For improved result formatting, select 'View Enhanced Report Format' under    Linked Documents section.      TEST(S):   Unilateral Bone Marrow Biopsy/Aspiration     FINAL DIAGNOSIS:   Bone marrow, posterior iliac crest, left decalcified trephine biopsy and   touch imprint; left particle crush,   direct aspirate smear, and concentrated aspirate smear; and peripheral   blood smear:     - Subcortical marrow biopsy; marrow cellularity of approximately 10-20%   with trilineage hematopoiesis and 5%   kappa monotypic plasma cells, compatible with recurrent/persistent plasma   cell myeloma     - Peripheral blood showing non-anemic, macrocytic peripheral blood     COMMENT:   Concurrent flow cytometry was performed ([id number]) and there were kappa   monotypic plasma cells identified.     On the smears the plasma cells have some globules/crystals in the   cytoplasm - these may be composed of   immunoglobin.  Evaluation for immunoglobulin elsewhere may be considered   (sometimes this can be seen in   polychromatic crystalline keratopathy).     Patient Name: SHIMON KNIGHT   MR#: [id number]   Specimen #: [id number]   Collected: 1/15/2019 14:30   Received: 1/15/2019 16:11   Reported: 1/16/2019 13:09   Ordering Phy(s): OSCAR GARCIASDMARITZA     For improved result formatting, select 'View Enhanced Report Format' under    Linked Documents section.   _________________________________________     SPECIMEN(S):   Bone marrow, left     INTERPRETATION:   Bone marrow, left:        Kappa monotypic plasma cells     COMMENT:   The immunophenotypic findings are supportive of recurrent/persistent   plasma cell neoplasm. Clinical and   morphologic correlation is required.     RESULTS:   Percentages reported below are based on the total number of CD45 positive   viable leukocytes. If applicable,   percentage of plasma cells is from total viable nucleated cells.     0.7% plasma cells which express CD19 (dim), CD38, CD45 (dim), CD56 and   monotypic cytoplasmic kappa   immunoglobulin light chains but lack CD20.  The plasma cell percentage by   flow  cytometry is expected to be   less than by morphology due to specimen processing.     Also present are:   0.3% polytypic plasma cells   0.4% polytypic B cells

## 2019-01-18 NOTE — NURSING NOTE
"Oncology Rooming Note    January 18, 2019 9:23 AM   Angel Yanez is a 58 year old male who presents for:    No chief complaint on file.    Initial Vitals: BP (!) 148/93   Pulse 81   Temp 98.1  F (36.7  C)   Resp 18   Wt 85.2 kg (187 lb 13.3 oz)   SpO2 97%   BMI 27.74 kg/m   Estimated body mass index is 27.74 kg/m  as calculated from the following:    Height as of 1/17/19: 1.753 m (5' 9\").    Weight as of this encounter: 85.2 kg (187 lb 13.3 oz). Body surface area is 2.04 meters squared.  Mild Pain (2) Comment: Data Unavailable   No LMP for male patient.  Allergies reviewed: Yes  Medications reviewed: Yes    Medications: Medication refills not needed today.  Pharmacy name entered into EPIC:    [facility] - [place] - [address]  [facility] - [place], [place] - [address]    Clinical concerns: Patient denies fevers/nausea/vomiting/diarrhea.  He has no complaints today.  He does have arthritic pain in his left him, rating it a 2/10 today.    4 minutes for nursing intake (face to face time)     CLARA ELLSWORTH RN                "

## 2019-01-18 NOTE — LETTER
1/18/2019      RE: Angel Yanez  [address]  [place] [address]           BMT DOM    BP (!) 148/93   Pulse 81   Temp 98.1  F (36.7  C)   Resp 18   Wt 85.2 kg (187 lb 13.3 oz)   SpO2 97%   BMI 27.74 kg/m      Wt Readings from Last 4 Encounters:   01/18/19 85.2 kg (187 lb 13.3 oz)   01/17/19 82.6 kg (182 lb 1.6 oz)   01/16/19 85.6 kg (188 lb 11.4 oz)   01/15/19 84.5 kg (186 lb 3.2 oz)   10/22/18          84.6 kg    There is a formal pre-autologous transplant evaluation for this 58-year-old man with chemotherapy sensitive recurrent myeloma.  Extensive detail of his history was outlined in my note from October 22, 2018  and his outside oncology records as well supplemented by the patient's description of his course.    Briefly he was diagnosed with IgA kappa myeloma in 2004 with 6.7 g M protein treated with Thal/Dex and then autologous transplantation in February 2005. His autologous transplantation in 2005 including high-dose melphalan and records appear that he was CMV seronegative at that point      He achieved complete remission which lasted until 2016 when he had slow progressive increase in his monoclonal protein.  By March 2018 it was 0.9 g and his bone marrow had 30-40% plasma cells with standard cytogenetics including deletion 13.  He had no bone lesions.    He was treated with RVD from April 2018 through December.  He has had a good response down to minimal disease burden.  He has had no need for transfusions, bone pain, paresthesias, neurologic symptoms or other complications of his therapy this year.    His other past history includes psoriasis and psoriatic arthritis plus hyperlipidemia he does not smoke or drink alcohol and has no other complicating interim problems.    His recent review of systems is quite negative.  His had no recent infections and has not lost much time from work during his recent therapy.  When he discontinued a number of his medications his arthritis is been a  little more active but modestly so.  He has had no active GI  respiratory cardiac or ENT symptoms and only limited skin rash from his psoriasis.    His exam shows his weight is steady over the last 3 months and his vital signs are acceptable.  He had no cervical supraclavicular axillary or inguinal lymphadenopathy.  His oropharynx is clear without mucosal lesions.  His lungs are clear.  His heart tones are regular.  He had no gallop rhythm or heart murmur.  His abdomen was soft and nontender without hepatosplenomegaly or masses.  There are no focal areas of bone tenderness.  Neurologic exam showed intact cranial nerves; 1+ deep tendon reflexes and heel toe Romberg finger-nose and rapid alternating movement testing was negative.    We had an extended (55 minute) consultation, nearly all in counseling reviewing the results of his pretransplant workup and the plan for his therapy.  There were no contraindications to proceeding with this second autologous transplant identified.    His blood counts and chemistries were good; his IgA was down to 327 with 0.2g/dl  M protein, both of which are improved from his last outside measurements in November.  His free light chains were in the normal range but immunofixation demonstrated monoclonal IgA kappa and small amounts in the serum.    His MUGA measured 43% LV ejection fraction but echocardiogram was 55% with an EKG in normal sinus rhythm.  A cardiology consultation identified no cardiac compromise.  He was CMV seropositive (different than in 2004) and HSV seropositive and EBV seropositive as well.  His other tested serologies were negative.  He had no bone lesions on bone survey.  His pulmonary function testing was acceptable and a bone marrow biopsy was 10-20% cellular with 5% plasma cells.    We reviewed the rationale for a 2nd autotransplant after his long remission and his chemotherapy sensitive state at this time.  We reviewed that he would again receive high-dose  melphalan with aggressive antiemetics, fluids, allopurinol, ursodiol, prophylactic antibiotics, and transfusions as needed to protect him during the period of pancytopenia.  We reviewed the risks of mucositis, GI upset, venoocclusive disease of the liver, pneumonitis, infections and bleeding.    We also discussed possible late effects of hypothyroidism, cataracts,sterility,hypogonadism, secondary marrow injury with myelodysplastic syndrome or treatment associated leukemia developing a small fraction of patients.    I did emphasize that posttransplant maintenance therapy would be again indicated with hopes that would reestablish a complete remission for an extended period of time.    We will collect his stem cells using cyclophosphamide for mobilization supplemented with filgrastim and Plerixafor and if a satisfactory graft is collected and cryopreserved we can then proceed with his auto transplantation.    His questions were answered in full and he is willing and consented today to proceed with autologous transplantation as well as [facility], to receive blood transfusions, to participate in a trial of intercept platelets for pathogen inactivation, to receive a CMV triplex vaccine.    He is an excellent candidate for this therapy and will hopefully begin his autograft stem cell mobilization early next week     In today's visit, we reviewed with the patient the protocols that Angel Yanez is eligible for. We discussed the potential risks and potential benefits of each protocol, individually. We explained potential alternatives to the proposed treatment. We explained to the patient that participation is voluntary and that consent may be withdrawn at any time. The patient had opportunity to ask questions that were answered to the best of my ability and to the patient's apparent satisfaction.    The patient's testing shows no evidence of infection that require continue management during the  treatment procedures. I reviewed and discussed findings with the patient and the management plan during treatment is outlined above    The patient completed the last round on treatment on Dec 25.    The patient's Karnofsky performance score was 90    The patient's HCT, CI, score was 4     We counseled the patient about the impact of this on the risk of treatment related and overall mortality. The score fit within treatment protocol eligibility criteria.    Link to HCT calculator: [email or web address]      The patient received necessary reproductive counseling and the need for effective contraception during the treatment procedures.    The patient's dental health is suitable to proceed: Yes    We then proceeded with reviewing and signing consents. The patient signed for the following consents for treatment and protocols listed above.  The patient received a copy of the consents.       Adebayo Lucio MD    Professor of medicine    Results for SHIMON KNIGHT (MRN [id number]) as of 1/18/2019 16:10   Ref. Range 1/14/2019 09:45 1/14/2019 09:48 1/14/2019 10:12 1/14/2019 10:29 1/14/2019 11:58 1/14/2019 11:59 1/14/2019 14:37 1/15/2019 08:00 1/15/2019 13:31 1/15/2019 14:25 1/15/2019 14:30 1/15/2019 15:53 1/16/2019 08:03 1/16/2019 12:00   Sodium Latest Ref Range: 133 - 144 mmol/L 137                Potassium Latest Ref Range: 3.4 - 5.3 mmol/L 3.9                Chloride Latest Ref Range: 94 - 109 mmol/L 106                Carbon Dioxide Latest Ref Range: 20 - 32 mmol/L 24                Urea Nitrogen Latest Ref Range: 7 - 30 mg/dL 22                Creatinine Latest Ref Range: 0.66 - 1.25 mg/dL 0.78       0.78         GFR Estimate Latest Ref Range: >60 mL/min/1.73_m2 >90                GFR Estimate If Black Latest Ref Range: >60 mL/min/1.73_m2 >90                Calcium Latest Ref Range: 8.5 - 10.1 mg/dL 8.7                Anion Gap Latest Ref Range: 3 - 14 mmol/L 6                 Magnesium Latest Ref Range: 1.6 - 2.3 mg/dL 2.0                Phosphorus Latest Ref Range: 2.5 - 4.5 mg/dL 2.6                Albumin Latest Ref Range: 3.4 - 5.0 g/dL 3.6                Protein Total Latest Ref Range: 6.8 - 8.8 g/dL 7.3                Bilirubin Total Latest Ref Range: 0.2 - 1.3 mg/dL 0.4                Alkaline Phosphatase Latest Ref Range: 40 - 150 U/L 75                ALT Latest Ref Range: 0 - 70 U/L 21                AST Latest Ref Range: 0 - 45 U/L 18                Beta-2-Microglobulin Latest Ref Range: <2.3 mg/L 2.0                CREATININE CLEARANCE Unknown        Rpt         Creatinine Urine Timed Latest Ref Range: 1.00 - 2.00         1.45         Creatinine Urine Latest Units: mg/dL        130         IGE Latest Ref Range: 0 - 114 KIU/L 6                Lactate Dehydrogenase Latest Ref Range: 85 - 227 U/L 219                Protein Random Urine Latest Units: g/L        0.07         Protein Total Urine g/gr Creatinine Latest Ref Range: 0 - 0.2 g/g Cr        0.05         Protein Total 24 Hr Urine Latest Ref Range: 0.04 - 0.23         0.07         Uric Acid Latest Ref Range: 3.5 - 7.2 mg/dL 4.2                Glucose Latest Ref Range: 70 - 99 mg/dL 116 (H)                pH Arterial Latest Ref Range: 7.35 - 7.45 pH    7.43             pCO2 Arterial Latest Ref Range: 35 - 45 mm Hg    36             PO2 Arterial Latest Ref Range: 80 - 105 mm Hg    90             Bicarbonate Arterial Latest Ref Range: 21 - 28 mmol/L    23             Base Excess Art Latest Units: mmol/L    0.0             FIO2 Unknown    21             Oxyhemoglobin Arterial Latest Ref Range: 92 - 100 %    96             Carbon Monoxide Latest Ref Range: 0 - 2 %    0.8             Methemoglobin Latest Ref Range: 0 - 3 %    0.1             WBC Latest Ref Range: 4.0 - 11.0 10e9/L 3.6 (L)        4.7        Hemoglobin Latest Ref Range: 13.3 - 17.7 g/dL 14.3   14.1     14.0        Hematocrit Latest Ref Range: 40.0 - 53.0 % 40.8         41.1        Platelet Count Latest Ref Range: 150 - 450 10e9/L 136 (L)        169        RBC Count Latest Ref Range: 4.4 - 5.9 10e12/L 4.02 (L)        4.04 (L)        MCV Latest Ref Range: 78 - 100 fl 102 (H)        102 (H)        MCH Latest Ref Range: 26.5 - 33.0 pg 35.6 (H)        34.7 (H)        MCHC Latest Ref Range: 31.5 - 36.5 g/dL 35.0        34.1        RDW Latest Ref Range: 10.0 - 15.0 % 13.4        13.3        Diff Method Unknown Automated Method        Automated Method        % Neutrophils Latest Units: % 43.1        50.8        % Lymphocytes Latest Units: % 40.5        34.6        % Monocytes Latest Units: % 14.5        12.7        % Eosinophils Latest Units: % 1.1        1.3        % Basophils Latest Units: % 0.8        0.6        % Immature Granulocytes Latest Units: % 0.0        0.0        Nucleated RBCs Latest Ref Range: 0 /100 0        0        Absolute Neutrophil Latest Ref Range: 1.6 - 8.3 10e9/L 1.5 (L)        2.4        Absolute Lymphocytes Latest Ref Range: 0.8 - 5.3 10e9/L 1.5        1.6        Absolute Monocytes Latest Ref Range: 0.0 - 1.3 10e9/L 0.5        0.6        Absolute Eosinophils Latest Ref Range: 0.0 - 0.7 10e9/L 0.0        0.1        Absolute Basophils Latest Ref Range: 0.0 - 0.2 10e9/L 0.0        0.0        Abs Immature Granulocytes Latest Ref Range: 0 - 0.4 10e9/L 0.0        0.0        Absolute Nucleated RBC Unknown 0.0        0.0        INR Latest Ref Range: 0.86 - 1.14  0.99                PTT Latest Ref Range: 22 - 37 sec 33                ABO Unknown  O               RH(D) Unknown  Pos               Antibody Screen Unknown  Neg               Test Valid Only At Latest Units:      [facility].               Specimen Expires Unknown  01/17/2019               Color Urine Unknown  Yellow               Appearance Urine Unknown  Clear               Glucose Urine Latest Ref Range: NEG^Negative mg/dL  Negative               Bilirubin Urine Latest Ref Range: NEG^Negative    Negative               Ketones Urine Latest Ref Range: NEG^Negative mg/dL  Negative               Specific Gravity Urine Latest Ref Range: 1.003 - 1.035   1.017               pH Urine Latest Ref Range: 5.0 - 7.0 pH  5.0               Protein Albumin Urine Latest Ref Range: NEG^Negative mg/dL  Negative               Urobilinogen mg/dL Latest Ref Range: 0.0 - 2.0 mg/dL  0.0               Nitrite Urine Latest Ref Range: NEG^Negative   Negative               Blood Urine Latest Ref Range: NEG^Negative   Negative               Leukocyte Esterase Urine Latest Ref Range: NEG^Negative   Negative               Source Unknown  Midstream Urine               WBC Urine Latest Ref Range: 0 - 5 /HPF  1               RBC Urine Latest Ref Range: 0 - 2 /HPF  <1               Mucous Urine Latest Ref Range: NEG^Negative /LPF  Present (A)               EBV Capsid Antibody IgG Latest Ref Range: 0.0 - 0.8 AI >8.0 (H)                HERPES SIMPLEX VIRUS TYPE 1 AND 2 IGG Unknown Rpt (A)                MPX Series Unknown  Nonreactive               West Nile Virus by PCR Unknown  Nonreactive               Donor Hep B Surf Agn Latest Ref Range: NR^Nonreactive   Nonreactive               Donor Hep B Core Brandon Latest Ref Range: NR^Nonreactive   Nonreactive               Donor Hepatitis C Brandon Latest Ref Range: NR^Nonreactive   Nonreactive               Donor HIV 1&2 Antibody Latest Ref Range: NR^Nonreactive   Nonreactive               Donor HTLV 1&2 Antibody Latest Ref Range: NR^Nonreactive   Nonreactive               Donor Cytomegalovirus Brandon Latest Ref Range: NR^Nonreactive   Positive (A)               Donor Treponema PAL BRANDON Unknown  Nonreactive               Trypanosoma Cruzi Latest Ref Range: NR^Nonreactive   Nonreactive               Albumin Fraction Latest Ref Range: 3.7 - 5.1 g/dL 4.3                Alpha 1 Fraction Latest Ref Range: 0.2 - 0.4 g/dL 0.3                Alpha 2 Fraction Latest Ref Range: 0.5 - 0.9 g/dL 0.7                B  CELL GENE REARRANGEMENT IGH PCR Unknown           Rpt      Beta Fraction Latest Ref Range: 0.6 - 1.0 g/dL 0.7                CHROMOSOME BONE MARROW Unknown           Rpt      ELP Interpretation: Unknown Small monoclonal ...                ELP Interpretation Urine Unknown        Only trace albumi...         FISH Unknown           Rpt      Gamma Fraction Latest Ref Range: 0.7 - 1.6 g/dL 1.1                IGA Latest Ref Range: 70 - 380 mg/dL 327                IGG Latest Ref Range: 695 - 1,620 mg/dL 864                IGM Latest Ref Range: 60 - 265 mg/dL 50 (L)                Immunofix ELP Urine Unknown        (Note)         Kappa Free Lt Chain Latest Ref Range: 0.33 - 1.94 mg/dL 0.71                Kappa Lambda Ratio Latest Ref Range: 0.26 - 1.65  0.42                Lambda Free Lt Chain Latest Ref Range: 0.57 - 2.63 mg/dL 1.69                LEUKEMIA LYMPHOMA EVALUATION (FLOW CYTOMETRY) Unknown           Rpt      Monoclonal Peak Latest Ref Range: 0.0 g/dL 0.2 (H)                BONE MARROW BIOPSY Unknown          Rpt       PATHOLOGY CONSULT Unknown              Rpt   Lab Scanned Result Unknown HEMOGLOBIN S-Scanned                XR BONE SURVEY COMPLETE Unknown     Rpt            XR CHEST 2 VW Unknown      Rpt           NM HEART MUGA REST Unknown       Rpt          FVC-Pred Latest Units: L   4.11              FVC-Pre Latest Units: L   3.92              FVC-%Pred-Pre Latest Units: %   95              FEV1-Pre Latest Units: L   3.06              FEV1-%Pred-Pre Latest Units: %   94              FEV1FVC-Pred Latest Units: %   76              FEV1FVC-Pre Latest Units: %   78              FEV1SVC-Pred Latest Units: %   67              FEV1SVC-Pre Latest Units: %   71              FEV1FEV6-Pred Latest Units: %   79              FEV1FEV6-Pre Latest Units: %   79              FEFMax-Pred Latest Units: L/sec   8.95              FEFMax-Pre Latest Units: L/sec   9.10              FEFMax-%Pred-Pre Latest Units: %   101               FIFMax-Pre Latest Units: L/sec   7.32              ExpTime-Pre Latest Units: sec   8.14              FRCPleth-Pred Latest Units: L   3.56              FRCPleth-Pre Latest Units: L   2.73              FRCPleth-%Pred-Pre Latest Units: %   76              RVPleth-Pred Latest Units: L   2.40              RVPleth-Pre Latest Units: L   2.38              RVPleth-%Pred-Pre Latest Units: %   98              TLCPleth-Pred Latest Units: L   6.92              TLCPleth-Pre Latest Units: L   6.65              TLCPleth-%Pred-Pre Latest Units: %   96              ERV-Pred Latest Units: L   1.12              ERV-Pre Latest Units: L   0.35              ERV-%Pred-Pre Latest Units: %   31              IC-Pred Latest Units: L   3.68              IC-Pre Latest Units: L   3.92              IC-%Pred-Pre Latest Units: %   106              VC-Pred Latest Units: L   4.80              VC-Pre Latest Units: L   4.28              VC-%Pred-Pre Latest Units: %   89              DLCOunc-Pred Latest Units: ml/min/mmHg   27.87              DLCOunc-Pre Latest Units: ml/min/mmHg   26.11              DLCOunc-%Pred-Pre Latest Units: %   93              DLCOcor-Pre Latest Units: ml/min/mmHg   26.70              DLCOcor-%Pred-Pre Latest Units: %   95              VA-Pre Latest Units: L   5.80              VA-%Pred-Pre Latest Units: %   87              ECHOCARDIOGRAM COMPLETE Unknown             Rpt    EKG 12-LEAD COMPLETE W/READ - CLINICS Unknown            Rpt     PFT GENERAL LAB TESTING Unknown   Rpt              ARK0618-%Pred-Pre Latest Units: %   97              FEF2575-Pre Latest Units: L/sec   2.67              FEF2575-Pred Latest Units: L/sec   2.73              FIO2-Pre Latest Units: %   21.00              Patient Name: SHIMON KNIGHT   MR#: [id number]   Specimen #: [id number]   Collected: 1/15/2019   Received: 1/15/2019   Reported: 1/17/2019 10:51   Ordering Phy(s): KAILYN MUNOZ   Additional Phy(s): OSCAR GARCIASDMARITZA   Copy to  [employer]     For improved result formatting, select 'View Enhanced Report Format' under    Linked Documents section.     TEST(S):   Unilateral Bone Marrow Biopsy/Aspiration     FINAL DIAGNOSIS:   Bone marrow, posterior iliac crest, left decalcified trephine biopsy and   touch imprint; left particle crush,   direct aspirate smear, and concentrated aspirate smear; and peripheral   blood smear:     - Subcortical marrow biopsy; marrow cellularity of approximately 10-20%   with trilineage hematopoiesis and 5%   kappa monotypic plasma cells, compatible with recurrent/persistent plasma   cell myeloma     - Peripheral blood showing non-anemic, macrocytic peripheral blood     COMMENT:   Concurrent flow cytometry was performed ([id number]) and there were kappa   monotypic plasma cells identified.     On the smears the plasma cells have some globules/crystals in the   cytoplasm - these may be composed of   immunoglobin.  Evaluation for immunoglobulin elsewhere may be considered   (sometimes this can be seen in   polychromatic crystalline keratopathy).     Patient Name: SHIMON KNIGHT   MR#: [id number]   Specimen #: [id number]   Collected: 1/15/2019 14:30   Received: 1/15/2019 16:11   Reported: 1/16/2019 13:09   Ordering Phy(s): OSCAR WRIGHT [facility]     For improved result formatting, select 'View Enhanced Report Format' under    Linked Documents section.   _________________________________________     SPECIMEN(S):   Bone marrow, left     INTERPRETATION:   Bone marrow, left:        Kappa monotypic plasma cells     COMMENT:   The immunophenotypic findings are supportive of recurrent/persistent   plasma cell neoplasm. Clinical and   morphologic correlation is required.     RESULTS:   Percentages reported below are based on the total number of CD45 positive   viable leukocytes. If applicable,   percentage of plasma cells is from total viable nucleated cells.     0.7% plasma cells which express CD19 (dim), CD38, CD45 (dim),  CD56 and   monotypic cytoplasmic kappa   immunoglobulin light chains but lack CD20.  The plasma cell percentage by   flow cytometry is expected to be   less than by morphology due to specimen processing.     Also present are:   0.3% polytypic plasma cells   0.4% polytypic B cells

## 2019-01-21 ENCOUNTER — TELEPHONE (OUTPATIENT)
Dept: INTERVENTIONAL RADIOLOGY/VASCULAR | Facility: CLINIC | Age: 59
End: 2019-01-21

## 2019-01-22 ENCOUNTER — APPOINTMENT (OUTPATIENT)
Dept: MEDSURG UNIT | Facility: CLINIC | Age: 59
DRG: 840 | End: 2019-01-22
Attending: INTERNAL MEDICINE
Payer: COMMERCIAL

## 2019-01-22 ENCOUNTER — APPOINTMENT (OUTPATIENT)
Dept: INTERVENTIONAL RADIOLOGY/VASCULAR | Facility: CLINIC | Age: 59
DRG: 840 | End: 2019-01-22
Attending: INTERNAL MEDICINE
Payer: COMMERCIAL

## 2019-01-22 ENCOUNTER — HOSPITAL ENCOUNTER (INPATIENT)
Facility: CLINIC | Age: 59
LOS: 1 days | Discharge: HOME | DRG: 840 | End: 2019-01-23
Attending: INTERNAL MEDICINE | Admitting: INTERNAL MEDICINE
Payer: COMMERCIAL

## 2019-01-22 DIAGNOSIS — Z86.2 PERSONAL HISTORY OF DISEASES OF BLOOD AND BLOOD-FORMING ORGANS: ICD-10-CM

## 2019-01-22 DIAGNOSIS — C90.00 MULTIPLE MYELOMA NOT HAVING ACHIEVED REMISSION (H): Primary | ICD-10-CM

## 2019-01-22 LAB
ABO + RH BLD: NORMAL
ABO + RH BLD: NORMAL
ALBUMIN SERPL-MCNC: 3.6 G/DL (ref 3.4–5)
ALP SERPL-CCNC: 62 U/L (ref 40–150)
ALT SERPL W P-5'-P-CCNC: 24 U/L (ref 0–70)
ANION GAP SERPL CALCULATED.3IONS-SCNC: 9 MMOL/L (ref 3–14)
APTT PPP: 34 SEC (ref 22–37)
AST SERPL W P-5'-P-CCNC: 22 U/L (ref 0–45)
BASOPHILS NFR BLD AUTO: 1 %
BILIRUB SERPL-MCNC: 0.5 MG/DL (ref 0.2–1.3)
BLD GP AB SCN SERPL QL: NORMAL
BLOOD BANK CMNT PATIENT-IMP: NORMAL
BUN SERPL-MCNC: 15 MG/DL (ref 7–30)
CALCIUM SERPL-MCNC: 8.8 MG/DL (ref 8.5–10.1)
CHLORIDE SERPL-SCNC: 106 MMOL/L (ref 94–109)
CO2 SERPL-SCNC: 26 MMOL/L (ref 20–32)
CREAT SERPL-MCNC: 0.95 MG/DL (ref 0.66–1.25)
DIFFERENTIAL METHOD BLD: ABNORMAL
EOSINOPHIL NFR BLD AUTO: 1 %
ERYTHROCYTE [DISTWIDTH] IN BLOOD BY AUTOMATED COUNT: 13.4 % (ref 10–15)
GFR SERPL CREATININE-BSD FRML MDRD: 87 ML/MIN/{1.73_M2}
GLUCOSE SERPL-MCNC: 91 MG/DL (ref 70–99)
HCT VFR BLD AUTO: 38.7 % (ref 40–53)
HGB BLD-MCNC: 13.2 G/DL (ref 13.3–17.7)
INR PPP: 1.02 (ref 0.86–1.14)
LYMPHOCYTES NFR BLD AUTO: 32 %
MCH RBC QN AUTO: 35.3 PG (ref 26.5–33)
MCHC RBC AUTO-ENTMCNC: 34.1 G/DL (ref 31.5–36.5)
MCV RBC AUTO: 104 FL (ref 78–100)
MONOCYTES NFR BLD AUTO: 15 %
NEUTROPHILS NFR BLD AUTO: 51 %
PLATELET # BLD AUTO: 169 10E9/L (ref 150–450)
POTASSIUM SERPL-SCNC: 3.8 MMOL/L (ref 3.4–5.3)
POTASSIUM SERPL-SCNC: 4 MMOL/L (ref 3.4–5.3)
PROT SERPL-MCNC: 7 G/DL (ref 6.8–8.8)
RBC # BLD AUTO: 3.74 10E12/L (ref 4.4–5.9)
SODIUM SERPL-SCNC: 138 MMOL/L (ref 133–144)
SODIUM SERPL-SCNC: 141 MMOL/L (ref 133–144)
SPECIMEN EXP DATE BLD: NORMAL
WBC # BLD AUTO: 4.2 10E9/L (ref 4–11)

## 2019-01-22 PROCEDURE — 27210904 ZZH KIT CR6

## 2019-01-22 PROCEDURE — 85025 COMPLETE CBC W/AUTO DIFF WBC: CPT | Performed by: INTERNAL MEDICINE

## 2019-01-22 PROCEDURE — 25800025 ZZH RX 258: Performed by: INTERNAL MEDICINE

## 2019-01-22 PROCEDURE — C1769 GUIDE WIRE: HCPCS

## 2019-01-22 PROCEDURE — 85730 THROMBOPLASTIN TIME PARTIAL: CPT | Performed by: PHYSICIAN ASSISTANT

## 2019-01-22 PROCEDURE — 25000128 H RX IP 250 OP 636: Performed by: PHYSICIAN ASSISTANT

## 2019-01-22 PROCEDURE — 86901 BLOOD TYPING SEROLOGIC RH(D): CPT | Performed by: PHYSICIAN ASSISTANT

## 2019-01-22 PROCEDURE — 76937 US GUIDE VASCULAR ACCESS: CPT

## 2019-01-22 PROCEDURE — 85610 PROTHROMBIN TIME: CPT | Performed by: PHYSICIAN ASSISTANT

## 2019-01-22 PROCEDURE — 86900 BLOOD TYPING SEROLOGIC ABO: CPT | Performed by: PHYSICIAN ASSISTANT

## 2019-01-22 PROCEDURE — 25000132 ZZH RX MED GY IP 250 OP 250 PS 637: Performed by: PHYSICIAN ASSISTANT

## 2019-01-22 PROCEDURE — C1750 CATH, HEMODIALYSIS,LONG-TERM: HCPCS

## 2019-01-22 PROCEDURE — 84132 ASSAY OF SERUM POTASSIUM: CPT | Performed by: INTERNAL MEDICINE

## 2019-01-22 PROCEDURE — 20600000 ZZH R&B BMT

## 2019-01-22 PROCEDURE — 36558 INSERT TUNNELED CV CATH: CPT | Mod: LT

## 2019-01-22 PROCEDURE — 25000128 H RX IP 250 OP 636: Performed by: INTERNAL MEDICINE

## 2019-01-22 PROCEDURE — 86850 RBC ANTIBODY SCREEN: CPT | Performed by: PHYSICIAN ASSISTANT

## 2019-01-22 PROCEDURE — 27210732 ZZH ACCESSORY CR1

## 2019-01-22 PROCEDURE — 02H633Z INSERTION OF INFUSION DEVICE INTO RIGHT ATRIUM, PERCUTANEOUS APPROACH: ICD-10-PCS | Performed by: PHYSICIAN ASSISTANT

## 2019-01-22 PROCEDURE — 27210738 ZZH ACCESSORY CR2

## 2019-01-22 PROCEDURE — 25800025 ZZH RX 258

## 2019-01-22 PROCEDURE — 40000166 ZZH STATISTIC PP CARE STAGE 1

## 2019-01-22 PROCEDURE — 80053 COMPREHEN METABOLIC PANEL: CPT | Performed by: INTERNAL MEDICINE

## 2019-01-22 PROCEDURE — 84295 ASSAY OF SERUM SODIUM: CPT | Performed by: INTERNAL MEDICINE

## 2019-01-22 PROCEDURE — 25000131 ZZH RX MED GY IP 250 OP 636 PS 637: Performed by: INTERNAL MEDICINE

## 2019-01-22 PROCEDURE — 27210908 ZZH NEEDLE CR4

## 2019-01-22 PROCEDURE — 3E04305 INTRODUCTION OF OTHER ANTINEOPLASTIC INTO CENTRAL VEIN, PERCUTANEOUS APPROACH: ICD-10-PCS | Performed by: INTERNAL MEDICINE

## 2019-01-22 RX ORDER — FUROSEMIDE 10 MG/ML
20 INJECTION INTRAMUSCULAR; INTRAVENOUS EVERY 8 HOURS PRN
Status: DISPENSED | OUTPATIENT
Start: 2019-01-22 | End: 2019-01-23

## 2019-01-22 RX ORDER — POTASSIUM CHLORIDE 7.45 MG/ML
10 INJECTION INTRAVENOUS
Status: DISCONTINUED | OUTPATIENT
Start: 2019-01-22 | End: 2019-01-24 | Stop reason: HOSPADM

## 2019-01-22 RX ORDER — CEFAZOLIN SODIUM 2 G/100ML
2 INJECTION, SOLUTION INTRAVENOUS
Status: COMPLETED | OUTPATIENT
Start: 2019-01-22 | End: 2019-01-22

## 2019-01-22 RX ORDER — DIPHENHYDRAMINE HCL 25 MG
25-50 CAPSULE ORAL EVERY 6 HOURS PRN
Status: DISCONTINUED | OUTPATIENT
Start: 2019-01-22 | End: 2019-01-24 | Stop reason: HOSPADM

## 2019-01-22 RX ORDER — POTASSIUM CHLORIDE 1.5 G/1.58G
20-40 POWDER, FOR SOLUTION ORAL
Status: DISCONTINUED | OUTPATIENT
Start: 2019-01-22 | End: 2019-01-24 | Stop reason: HOSPADM

## 2019-01-22 RX ORDER — PANTOPRAZOLE SODIUM 20 MG/1
20 TABLET, DELAYED RELEASE ORAL DAILY
Status: DISCONTINUED | OUTPATIENT
Start: 2019-01-22 | End: 2019-01-24 | Stop reason: HOSPADM

## 2019-01-22 RX ORDER — ACYCLOVIR 400 MG/1
400 TABLET ORAL EVERY 12 HOURS
Status: DISCONTINUED | OUTPATIENT
Start: 2019-01-22 | End: 2019-01-24 | Stop reason: HOSPADM

## 2019-01-22 RX ORDER — SODIUM CHLORIDE 9 MG/ML
INJECTION, SOLUTION INTRAVENOUS CONTINUOUS
Status: DISCONTINUED | OUTPATIENT
Start: 2019-01-22 | End: 2019-01-24 | Stop reason: HOSPADM

## 2019-01-22 RX ORDER — MAGNESIUM SULFATE HEPTAHYDRATE 40 MG/ML
4 INJECTION, SOLUTION INTRAVENOUS EVERY 4 HOURS PRN
Status: DISCONTINUED | OUTPATIENT
Start: 2019-01-22 | End: 2019-01-24 | Stop reason: HOSPADM

## 2019-01-22 RX ORDER — POTASSIUM CHLORIDE 29.8 MG/ML
20 INJECTION INTRAVENOUS
Status: DISCONTINUED | OUTPATIENT
Start: 2019-01-22 | End: 2019-01-24 | Stop reason: HOSPADM

## 2019-01-22 RX ORDER — ACETAMINOPHEN 325 MG/1
650 TABLET ORAL EVERY 6 HOURS PRN
Status: DISCONTINUED | OUTPATIENT
Start: 2019-01-22 | End: 2019-01-24 | Stop reason: HOSPADM

## 2019-01-22 RX ORDER — ACETAMINOPHEN 325 MG/1
325-650 TABLET ORAL EVERY 4 HOURS PRN
Status: DISCONTINUED | OUTPATIENT
Start: 2019-01-22 | End: 2019-01-24 | Stop reason: HOSPADM

## 2019-01-22 RX ORDER — PROCHLORPERAZINE MALEATE 5 MG
5-10 TABLET ORAL EVERY 6 HOURS PRN
Status: DISCONTINUED | OUTPATIENT
Start: 2019-01-22 | End: 2019-01-24 | Stop reason: HOSPADM

## 2019-01-22 RX ORDER — PREDNISOLONE ACETATE 10 MG/ML
1-2 SUSPENSION/ DROPS OPHTHALMIC EVERY 4 HOURS PRN
Status: DISCONTINUED | OUTPATIENT
Start: 2019-01-22 | End: 2019-01-24 | Stop reason: HOSPADM

## 2019-01-22 RX ORDER — HEPARIN SODIUM (PORCINE) LOCK FLUSH IV SOLN 100 UNIT/ML 100 UNIT/ML
3 SOLUTION INTRAVENOUS EVERY 24 HOURS
Status: DISCONTINUED | OUTPATIENT
Start: 2019-01-22 | End: 2019-01-24 | Stop reason: HOSPADM

## 2019-01-22 RX ORDER — ONDANSETRON 8 MG/1
8 TABLET, FILM COATED ORAL EVERY 8 HOURS
Status: COMPLETED | OUTPATIENT
Start: 2019-01-22 | End: 2019-01-23

## 2019-01-22 RX ORDER — POTASSIUM CL/LIDO/0.9 % NACL 10MEQ/0.1L
10 INTRAVENOUS SOLUTION, PIGGYBACK (ML) INTRAVENOUS
Status: DISCONTINUED | OUTPATIENT
Start: 2019-01-22 | End: 2019-01-24 | Stop reason: HOSPADM

## 2019-01-22 RX ORDER — DEXTROSE MONOHYDRATE, SODIUM CHLORIDE, AND POTASSIUM CHLORIDE 50; 1.49; 4.5 G/1000ML; G/1000ML; G/1000ML
INJECTION, SOLUTION INTRAVENOUS CONTINUOUS
Status: DISPENSED | OUTPATIENT
Start: 2019-01-22 | End: 2019-01-23

## 2019-01-22 RX ORDER — HEPARIN SODIUM (PORCINE) LOCK FLUSH IV SOLN 100 UNIT/ML 100 UNIT/ML
3 SOLUTION INTRAVENOUS EVERY 24 HOURS
Status: CANCELLED | OUTPATIENT
Start: 2019-01-22

## 2019-01-22 RX ORDER — HEPARIN SODIUM (PORCINE) LOCK FLUSH IV SOLN 100 UNIT/ML 100 UNIT/ML
3 SOLUTION INTRAVENOUS
Status: DISCONTINUED | OUTPATIENT
Start: 2019-01-22 | End: 2019-01-24 | Stop reason: HOSPADM

## 2019-01-22 RX ORDER — DEXTROSE MONOHYDRATE 25 G/50ML
25-50 INJECTION, SOLUTION INTRAVENOUS
Status: DISCONTINUED | OUTPATIENT
Start: 2019-01-22 | End: 2019-01-24 | Stop reason: HOSPADM

## 2019-01-22 RX ORDER — DEXTROSE MONOHYDRATE, SODIUM CHLORIDE, AND POTASSIUM CHLORIDE 50; 1.49; 4.5 G/1000ML; G/1000ML; G/1000ML
INJECTION, SOLUTION INTRAVENOUS
Status: COMPLETED
Start: 2019-01-22 | End: 2019-01-22

## 2019-01-22 RX ORDER — FUROSEMIDE 10 MG/ML
10 INJECTION INTRAMUSCULAR; INTRAVENOUS
Status: DISPENSED | OUTPATIENT
Start: 2019-01-22 | End: 2019-01-23

## 2019-01-22 RX ORDER — NICOTINE POLACRILEX 4 MG
15-30 LOZENGE BUCCAL
Status: DISCONTINUED | OUTPATIENT
Start: 2019-01-22 | End: 2019-01-24 | Stop reason: HOSPADM

## 2019-01-22 RX ORDER — HEPARIN SODIUM,PORCINE 10 UNIT/ML
5-10 VIAL (ML) INTRAVENOUS
Status: DISCONTINUED | OUTPATIENT
Start: 2019-01-22 | End: 2019-01-24 | Stop reason: HOSPADM

## 2019-01-22 RX ORDER — POTASSIUM CHLORIDE 750 MG/1
20-40 TABLET, EXTENDED RELEASE ORAL
Status: DISCONTINUED | OUTPATIENT
Start: 2019-01-22 | End: 2019-01-24 | Stop reason: HOSPADM

## 2019-01-22 RX ORDER — HEPARIN SODIUM,PORCINE 10 UNIT/ML
5-10 VIAL (ML) INTRAVENOUS EVERY 24 HOURS
Status: DISCONTINUED | OUTPATIENT
Start: 2019-01-22 | End: 2019-01-24 | Stop reason: HOSPADM

## 2019-01-22 RX ORDER — HEPARIN SODIUM (PORCINE) LOCK FLUSH IV SOLN 100 UNIT/ML 100 UNIT/ML
3 SOLUTION INTRAVENOUS
Status: CANCELLED | OUTPATIENT
Start: 2019-01-22

## 2019-01-22 RX ADMIN — POTASSIUM CHLORIDE, DEXTROSE MONOHYDRATE AND SODIUM CHLORIDE: 150; 5; 450 INJECTION, SOLUTION INTRAVENOUS at 13:22

## 2019-01-22 RX ADMIN — ONDANSETRON HYDROCHLORIDE 8 MG: 8 TABLET, FILM COATED ORAL at 18:30

## 2019-01-22 RX ADMIN — ACETAMINOPHEN 650 MG: 325 TABLET, FILM COATED ORAL at 16:06

## 2019-01-22 RX ADMIN — POTASSIUM CHLORIDE, DEXTROSE MONOHYDRATE AND SODIUM CHLORIDE: 150; 5; 450 INJECTION, SOLUTION INTRAVENOUS at 18:30

## 2019-01-22 RX ADMIN — Medication 2 ML: at 12:43

## 2019-01-22 RX ADMIN — SODIUM CHLORIDE: 9 INJECTION, SOLUTION INTRAVENOUS at 11:19

## 2019-01-22 RX ADMIN — FUROSEMIDE 20 MG: 10 INJECTION, SOLUTION INTRAVENOUS at 23:07

## 2019-01-22 RX ADMIN — DEXAMETHASONE 10 MG: 2 TABLET ORAL at 18:30

## 2019-01-22 RX ADMIN — CEFAZOLIN SODIUM 2 G: 2 INJECTION, SOLUTION INTRAVENOUS at 11:19

## 2019-01-22 RX ADMIN — CYCLOPHOSPHAMIDE 8000 MG: 2 INJECTION, POWDER, FOR SOLUTION INTRAVENOUS; ORAL at 19:07

## 2019-01-22 RX ADMIN — PROCHLORPERAZINE EDISYLATE 10 MG: 5 INJECTION INTRAMUSCULAR; INTRAVENOUS at 21:53

## 2019-01-22 RX ADMIN — MESNA 8000 MG: 100 INJECTION, SOLUTION INTRAVENOUS at 17:29

## 2019-01-22 ASSESSMENT — ACTIVITIES OF DAILY LIVING (ADL)
ADLS_ACUITY_SCORE: 11
ADLS_ACUITY_SCORE: 13

## 2019-01-22 ASSESSMENT — MIFFLIN-ST. JEOR
SCORE: 1652.38
SCORE: 1621.76

## 2019-01-22 NOTE — IR NOTE
Patient Name: Angel Yanez  Medical Record Number: [id number]  Today's Date: 1/22/2019    Procedure: central venous catheter tunneled line  Proceduralist: Brea Bravo PA-C    Procedure start time: 1210  Puncture time: 1212  Procedure end time: 1245    Report given to: VIDAL Sosa  : None    Other Notes: Pt arrived to IR room 2 from . Consent reviewed. Pt denies any questions or concerns regarding procedure. Pt positioned supine and monitored per protocol. Target vessel identified. Dual lumen palindrome  placed. Blood return in both lumens. Lumens flushed with 2cc 100u heparin. Line sutured in placed. Central line dressing applied.     Pt tolerated procedure without any noted complications. Pt tolerated procedure without any noted complications. Pt transferred to  for admission.

## 2019-01-22 NOTE — H&P
BMT History & Physical     Admission  Name: Angel Yanez  Date:  1/22/2019  Service: BMT   Chief Complaint:  Multiple myeloma- chemo priming admission   Informant:  Patient and Chart  Resuscitation Status: Full Code    Patient ID:  Angel Yanez is a 58 year old male, Admitted for cytoxan chemo priming prior to stem cell collection (This will be 2nd Autologous pbsct as long remission from [id number].     Guille is feeling well. NO fever, cough, cold symptoms in the last few days. He is somewhat nervous about cytoxan as he did get ill with it 15 year ago (n/v). Discussed plan for decadron/zofran with prn compazine (hx of hives with ativan).     Transplant Essential Data:  Diagnosis IgA Kappa Myeloma   HCT Type Autologous    Prep Regimen Cytoxan Chemopriming   Donor Source N/a this admission     GVHD Prophylaxis N/A  Clinical Trials N/A     Oncology Treatment History: Detailed extensively in 1/18/2019 note by Dr Lucio and 10/22/2018   Mr Yanez was diagnosed with IgA kappa myeloma in 2004 with 6.7 g M protein treated with thal/Dex and then autologous transplant at the [facility] in February 2005 leading to complete remission.  He stayed in remission for a long time--until late 2016 his M protein started to increase.  In March 2018 it was 0.9 g, IgA was 1350, bone marrow had 60% involvement (other pathologic interpretations report it at 30-40%) and he had standard cytogenetics with deletion 13.  He had no focal bone lesions.  He was treated with RVD which he has tolerated very well from April until now.  He has had progressive fall in his M protein but perhaps a plateau at 0.3-0.4 g with similar IgA levels since August.  He has had no neuropathy, need for transfusion, fever chills rash bleeding bruising GI  or respiratory symptomatology.  His psoriasis is cleared substantially.      Treatment/Chemotherapy Number of Cycles Date Range Outcomes & Complications   Thal/Dex      Melphalan Auto   2/2005  No significant complications; CR until 2016 (slow rise in Mspike until 2018)   RVD 8 cycles 4/2018-12/2018 Good Response   Cytoxan Chemopriming  1/2019 admission        Bone Marrow Workup Results:  Blood Counts Recent Labs   Lab Test 01/22/19  1329 01/15/19  1331   WBC 4.2 4.7   ANEU  --  2.4   ALYM  --  1.6   NEGAR  --  0.6   AEOS  --  0.1   HGB 13.2* 14.0   HCT 38.7* 41.1    169      Bone Marrow 1/15/19:     - Subcortical marrow biopsy; marrow cellularity of approximately 10-20% with trilineage hematopoiesis and 5%   kappa monotypic plasma cells, compatible with recurrent/persistent plasma cell myeloma   COMMENT:   Concurrent flow cytometry was performed ([id number]) and there were kappa monotypic plasma cells identified.   Blood Type Recent Labs   Lab Test 01/22/19  1329   ABO PENDING      Chemistries Recent Labs   Lab Test 01/15/19  0800 01/14/19  0945   NA  --  137   POTASSIUM  --  3.9   CHLORIDE  --  106   CO2  --  24   BUN  --  22   CR 0.78 0.78      Liver Tests Recent Labs   Lab Test 01/14/19  0945   BILITOTAL 0.4   ALKPHOS 75   AST 18   ALT 21      PET/CT: n/a   Chest X-Ray: 1/14/19: IMPRESSION:   No acute cardiopulmonary abnormality.    Bone survey 1/14/19: 1. No definite suspicious lytic lesion in axial or appendicular  skeleton.  2. Multiple thoracic and lumbar compression deformities are stable.   PFTs FVC%  Recent Labs   Lab Test 01/14/19  1012   09080 95       FEV1%  Recent Labs   Lab Test 01/14/19  1012   52999 94       DLCO%  Recent Labs   Lab Test 01/14/19  1012   83352 95      ECHO  1/16/19: Normal LV and RV size, thickness, and function, LVEF=55%.  Global peak LV longitudinal strain is averaged at -22.4%. This is within  reported normal limits (normal <-18%).  No significant valvular dysfunction.   EKG 1/15/19: NSR   Serologies: CMV +, EBV +, HSV +,        Family History:   Family History   Problem Relation Age of Onset     Diabetes Mother        Social History:   Social History      Socioeconomic History     Marital status:      Spouse name: Not on file     Number of children: Not on file     Years of education: Not on file     Highest education level: Not on file   Social Needs     Financial resource strain: Not on file     Food insecurity - worry: Not on file     Food insecurity - inability: Not on file     Transportation needs - medical: Not on file     Transportation needs - non-medical: Not on file   Occupational History     Not on file   Tobacco Use     Smoking status: Former Smoker     Smokeless tobacco: Never Used   Substance and Sexual Activity     Alcohol use: Yes     Comment: occasionaly     Drug use: No     Sexual activity: Not on file   Other Topics Concern     Parent/sibling w/ CABG, MI or angioplasty before 65F 55M? Not Asked   Social History Narrative     Not on file       Past Medical History:   Past Medical History:   Diagnosis Date     Arthritis      Cancer (H)      GERD (gastroesophageal reflux disease)      Multiple myeloma (H)      Skin disease      Stem cell transplant candidate         Past Surgical History:   Past Surgical History:   Procedure Laterality Date     ARTHROPLASTY HIP       COLONOSCOPY       HERNIA REPAIR       IR CVC TUNNEL PLACEMENT > 5 YRS OF AGE  1/22/2019     ORTHOPEDIC SURGERY       TRANSPLANT         Allergies:   Allergies   Allergen Reactions     Avalide Hives     Per 10-8-15 H&P by Ayana Love PA-C.  Per 10-8-15 H&P by Ayana Love PA-C.       Lorazepam Hives     Other reaction(s): Hives       Chloroxylenol Rash       Home Medications      Prior to Admission medications    Medication Sig Start Date End Date Taking? Authorizing Provider   acyclovir (ZOVIRAX) 400 MG tablet Take 400 mg by mouth every 12 hours  9/16/18  Yes Reported, Patient   calcipotriene (DOVONOX) 0.005 % SOLN solution Apply topically 2 times daily as needed  12/20/17  Yes Reported, Patient   calcium carbonate (CALCIUM CARBONATE) 600 MG tablet Take 2 tablets by  "mouth daily    Yes Reported, Patient   Cholecalciferol (VITAMIN D3) 2000 units CAPS Take 2,000 Units by mouth daily    Yes Reported, Patient   Glucosamine-Chondroit-Vit C-Mn (GLUCOSAMINE-CHONDROITIN) TABS Take 1 tablet by mouth daily  1/1/01  Yes Reported, Patient   prednisoLONE acetate (PRED FORTE) 1 % ophthalmic susp Place 1-2 drops into both eyes every 4 hours as needed for dry eyes  12/9/15  Yes Reported, Patient   amoxicillin (AMOXIL) 500 MG capsule Take 500 mg by mouth once as needed (Take Prior To Dental Procedures)  12/30/17   Reported, Patient   omeprazole (PRILOSEC OTC) 20 MG tablet Take 20 mg by mouth daily  4/7/18   Reported, Patient       Review of Systems    Review of Systems:  CONSTITUTIONAL: NEGATIVE for fever, chills, change in weight  INTEGUMENTARY/SKIN: NEGATIVE for worrisome rashes, moles or lesions  EYES: NEGATIVE for vision changes or irritation  ENT/MOUTH: NEGATIVE for ear, mouth and throat problems  RESP: NEGATIVE for significant cough or SOB  BREAST: NEGATIVE for masses, tenderness or discharge  CV: NEGATIVE for chest pain, palpitations or peripheral edema  GI: NEGATIVE for nausea, abdominal pain, heartburn, or change in bowel habits  : NEGATIVE for frequency, dysuria, or hematuria  MUSCULOSKELETAL: NEGATIVE for significant arthralgias or myalgia  NEURO: NEGATIVE for weakness, dizziness or paresthesias  ENDOCRINE: NEGATIVE for temperature intolerance, skin/hair changes  HEME/ALLERGY: NEGATIVE for bleeding problems  PSYCHIATRIC: NEGATIVE for changes in mood or affect    PHYSICAL EXAM      Weight     Wt Readings from Last 3 Encounters:   01/22/19 82.3 kg (181 lb 7 oz)   01/18/19 85.2 kg (187 lb 13.3 oz)   01/17/19 82.6 kg (182 lb 1.6 oz)          BP (!) 143/96   Pulse 79   Temp 97.8  F (36.6  C) (Axillary)   Resp 13   Ht 1.75 m (5' 8.9\")   Wt 82.3 kg (181 lb 7 oz)   SpO2 95%   BMI 26.87 kg/m       General: NAD   Eyes: FARRAH, sclera anicteric   Nose/Mouth/Throat: OP clear, buccal mucosa " moist, no ulcerations   Lungs: CTA bilaterally  Cardiovascular: RRR, no M/R/G   Abdominal/Rectal: +BS, soft, NT, ND, No HSM   Lymphatics: No edema  Skin: No rashes or petechaie  Neuro: A&O   Additional Findings: Hsieh site NT, no drainage.    ASSESSMENT BY SYSTEMS   Angel Yanez is a 58 year old male with IgA Kappa MM - Admitted for cytoxan chemo-priming prior to 2nd autologous pbsct.     1.  BMT:   Cytoxan . Allopurinol through day 0. Flush and pre-meds prior to transplant. GCSF starts 72 hr post cytoxan ( 1/26/19)and continue through stem cell collections.     2.  HEME: Keep Hgb>8 and plts>10K. No pre-meds.                            3.  ID: Afebrile.   - Will start levaquin 250mg po daily tomorrow with impending neutropenia through stem cell collection.   - Continue LD Acy 400mg PO BID - will transition to 800 5x daily post transplant given CMV+                                        4.  GI: Zofran and dexamethasone prior to chemo to prevent N/V.   compazine available PRN break-through symptoms (no ativan as hx of hives with it). Protonix for GI prophy. Ursodiol for VOD prophy.    5.  FEN/Renal: Monitor creat and lytes.   Replete lytes PRN per SS. Monitor weight, I+O, lytes per protocol with IVF flush.    6.) HTN: seems quite hypertensive today but just had line placement. WHite coat?   -Hx of hyperlipidemia  Will order prn hydralazine. If reamins hypertensive may start tx prior to discharge       Myrna Marrufo PA-C  [phone]    Attending Summary  The patient was seen and examined by me separate from the midlevel provider.The note above reflects my assessment and plan. I have personally reviewed today's labs,vital and radiology results. The points of care that were added by me are:     History and physical  Here for Cytoxan mobilization.  Afebrile, and no URI symptoms.      On exam:  Head/mouth/neck: Oropharynx clear.  No lymphadenopathy.  Heart: Regular rate and rhythm.  No murmurs rubs or gallops.  Lungs:  Lungs are clear bilaterally.  Abdomen: Abdomen is soft nontender nondistended.  Intact bowel sounds.  Ext: No edema.  Skin: No rash.    Pertinent Labs:  Reviewed    ASSESSMENT AND PLAN  Clear to begin Cytoxan priming for second ASCT.    Anuel Lanier MD

## 2019-01-22 NOTE — PROGRESS NOTES
..Patient admitted to:5c  Admitted from:IR  Arrived by:[facility]  Reason for admission:chemo priming  Patient accompanied by:self  Belongings:clothing, glasses  Teaching:admission

## 2019-01-22 NOTE — PROCEDURES
Interventional Radiology Brief Post Procedure Note    Procedure: IR CVC TUNNEL PLACEMENT > 5 YRS OF AGE    Proceduralist: Brea Bravo PA-C    Assistant: None    Time Out: Prior to the start of the procedure and with procedural staff participation, I verbally confirmed the patient s identity using two indicators, relevant allergies, that the procedure was appropriate and matched the consent or emergent situation, and that the correct equipment/implants were available. Immediately prior to starting the procedure I conducted the Time Out with the procedural staff and re-confirmed the patient s name, procedure, and site/side. (The Joint Commission universal protocol was followed.)  Yes    Medications   Medication Event Details Admin User Admin Time   heparin 100 UNIT/ML injection 3 mL Medication Given Dose: 2 mL; Route: Intracatheter; Scheduled Time: 11:45 AM Garima Trujillo RN 1/22/2019 12:43 PM       Sedation: None. Local Anesthestic used    Findings:  Completed placement of 14.5 Spanish, 23 cm double lumen tunneled central venous catheter via right IJ. Aspirates and flushes freely, heparin locked and ready for immediate use.      Estimated Blood Loss: Minimal    Fluoroscopy Time: 0.3 minute(s)    SPECIMENS: None    Complications: 1. None     Condition: Stable    Plan: Follow up per primary team.     Comments: See dictated procedure note for full details.    Brea Bravo PA-C

## 2019-01-23 ENCOUNTER — APPOINTMENT (OUTPATIENT)
Dept: GENERAL RADIOLOGY | Facility: CLINIC | Age: 59
DRG: 840 | End: 2019-01-23
Attending: INTERNAL MEDICINE
Payer: COMMERCIAL

## 2019-01-23 VITALS
HEART RATE: 75 BPM | WEIGHT: 184.7 LBS | RESPIRATION RATE: 16 BRPM | TEMPERATURE: 97.4 F | OXYGEN SATURATION: 94 % | BODY MASS INDEX: 27.36 KG/M2 | DIASTOLIC BLOOD PRESSURE: 76 MMHG | SYSTOLIC BLOOD PRESSURE: 135 MMHG | HEIGHT: 69 IN

## 2019-01-23 LAB
ANION GAP SERPL CALCULATED.3IONS-SCNC: 8 MMOL/L (ref 3–14)
BASOPHILS # BLD AUTO: 0 10E9/L (ref 0–0.2)
BASOPHILS NFR BLD AUTO: 0 %
BUN SERPL-MCNC: 13 MG/DL (ref 7–30)
CALCIUM SERPL-MCNC: 8.1 MG/DL (ref 8.5–10.1)
CHLORIDE SERPL-SCNC: 104 MMOL/L (ref 94–109)
CO2 SERPL-SCNC: 26 MMOL/L (ref 20–32)
CREAT SERPL-MCNC: 0.96 MG/DL (ref 0.66–1.25)
DIFFERENTIAL METHOD BLD: ABNORMAL
EOSINOPHIL # BLD AUTO: 0 10E9/L (ref 0–0.7)
EOSINOPHIL NFR BLD AUTO: 0 %
ERYTHROCYTE [DISTWIDTH] IN BLOOD BY AUTOMATED COUNT: 13.4 % (ref 10–15)
GFR SERPL CREATININE-BSD FRML MDRD: 85 ML/MIN/{1.73_M2}
GLUCOSE SERPL-MCNC: 145 MG/DL (ref 70–99)
HCT VFR BLD AUTO: 37.5 % (ref 40–53)
HGB BLD-MCNC: 12.4 G/DL (ref 13.3–17.7)
IMM GRANULOCYTES # BLD: 0 10E9/L (ref 0–0.4)
IMM GRANULOCYTES NFR BLD: 0.3 %
LYMPHOCYTES # BLD AUTO: 0.7 10E9/L (ref 0.8–5.3)
LYMPHOCYTES NFR BLD AUTO: 20.7 %
MAGNESIUM SERPL-MCNC: 1.8 MG/DL (ref 1.6–2.3)
MCH RBC QN AUTO: 34.1 PG (ref 26.5–33)
MCHC RBC AUTO-ENTMCNC: 33.1 G/DL (ref 31.5–36.5)
MCV RBC AUTO: 103 FL (ref 78–100)
MONOCYTES # BLD AUTO: 0.1 10E9/L (ref 0–1.3)
MONOCYTES NFR BLD AUTO: 3.7 %
NEUTROPHILS # BLD AUTO: 2.6 10E9/L (ref 1.6–8.3)
NEUTROPHILS NFR BLD AUTO: 75.3 %
NRBC # BLD AUTO: 0 10*3/UL
NRBC BLD AUTO-RTO: 0 /100
PLATELET # BLD AUTO: 132 10E9/L (ref 150–450)
POTASSIUM SERPL-SCNC: 4.2 MMOL/L (ref 3.4–5.3)
RBC # BLD AUTO: 3.64 10E12/L (ref 4.4–5.9)
SODIUM SERPL-SCNC: 138 MMOL/L (ref 133–144)
URATE SERPL-MCNC: 4.4 MG/DL (ref 3.5–7.2)
WBC # BLD AUTO: 3.5 10E9/L (ref 4–11)

## 2019-01-23 PROCEDURE — 25800025 ZZH RX 258: Performed by: INTERNAL MEDICINE

## 2019-01-23 PROCEDURE — 84550 ASSAY OF BLOOD/URIC ACID: CPT | Performed by: PHYSICIAN ASSISTANT

## 2019-01-23 PROCEDURE — 80048 BASIC METABOLIC PNL TOTAL CA: CPT | Performed by: PHYSICIAN ASSISTANT

## 2019-01-23 PROCEDURE — 25000128 H RX IP 250 OP 636: Performed by: PHYSICIAN ASSISTANT

## 2019-01-23 PROCEDURE — 25000131 ZZH RX MED GY IP 250 OP 636 PS 637: Performed by: INTERNAL MEDICINE

## 2019-01-23 PROCEDURE — 85025 COMPLETE CBC W/AUTO DIFF WBC: CPT | Performed by: PHYSICIAN ASSISTANT

## 2019-01-23 PROCEDURE — 40000986 XR CHEST PORT 1 VW

## 2019-01-23 PROCEDURE — 83735 ASSAY OF MAGNESIUM: CPT | Performed by: PHYSICIAN ASSISTANT

## 2019-01-23 PROCEDURE — 25000128 H RX IP 250 OP 636: Performed by: INTERNAL MEDICINE

## 2019-01-23 PROCEDURE — 25000132 ZZH RX MED GY IP 250 OP 250 PS 637: Performed by: PHYSICIAN ASSISTANT

## 2019-01-23 RX ORDER — LEVOFLOXACIN 250 MG/1
250 TABLET, FILM COATED ORAL DAILY
Qty: 21 TABLET | Refills: 0 | Status: SHIPPED | OUTPATIENT
Start: 2019-01-23 | End: 2019-02-04

## 2019-01-23 RX ORDER — HEPARIN SODIUM,PORCINE 10 UNIT/ML
5 VIAL (ML) INTRAVENOUS
Status: CANCELLED | OUTPATIENT
Start: 2019-01-26

## 2019-01-23 RX ORDER — ONDANSETRON 4 MG/1
4-8 TABLET, ORALLY DISINTEGRATING ORAL EVERY 8 HOURS PRN
Qty: 30 TABLET | Refills: 0 | Status: SHIPPED | OUTPATIENT
Start: 2019-01-23 | End: 2019-05-06

## 2019-01-23 RX ORDER — PROCHLORPERAZINE MALEATE 5 MG
5-10 TABLET ORAL EVERY 6 HOURS PRN
Qty: 30 TABLET | Refills: 0 | Status: SHIPPED | OUTPATIENT
Start: 2019-01-23 | End: 2019-05-06

## 2019-01-23 RX ORDER — FLUCONAZOLE 200 MG/1
200 TABLET ORAL DAILY
Qty: 30 TABLET | Refills: 0 | Status: SHIPPED | OUTPATIENT
Start: 2019-01-23 | End: 2019-02-04

## 2019-01-23 RX ORDER — ACETAMINOPHEN 325 MG/1
325-650 TABLET ORAL EVERY 4 HOURS PRN
Status: ON HOLD | COMMUNITY
Start: 2019-01-23 | End: 2019-05-28

## 2019-01-23 RX ADMIN — DEXAMETHASONE 10 MG: 2 TABLET ORAL at 11:36

## 2019-01-23 RX ADMIN — FUROSEMIDE 10 MG: 10 INJECTION, SOLUTION INTRAVENOUS at 13:41

## 2019-01-23 RX ADMIN — ACETAMINOPHEN 650 MG: 325 TABLET, FILM COATED ORAL at 06:49

## 2019-01-23 RX ADMIN — POTASSIUM CHLORIDE, DEXTROSE MONOHYDRATE AND SODIUM CHLORIDE: 150; 5; 450 INJECTION, SOLUTION INTRAVENOUS at 06:46

## 2019-01-23 RX ADMIN — Medication 5 ML: at 18:15

## 2019-01-23 RX ADMIN — ACYCLOVIR 400 MG: 400 TABLET ORAL at 01:10

## 2019-01-23 RX ADMIN — ONDANSETRON HYDROCHLORIDE 8 MG: 8 TABLET, FILM COATED ORAL at 01:10

## 2019-01-23 RX ADMIN — ACYCLOVIR 400 MG: 400 TABLET ORAL at 08:34

## 2019-01-23 RX ADMIN — Medication 5 ML: at 18:14

## 2019-01-23 RX ADMIN — ACETAMINOPHEN 650 MG: 325 TABLET, FILM COATED ORAL at 13:44

## 2019-01-23 RX ADMIN — ONDANSETRON HYDROCHLORIDE 8 MG: 8 TABLET, FILM COATED ORAL at 08:34

## 2019-01-23 RX ADMIN — PANTOPRAZOLE SODIUM 20 MG: 20 TABLET, DELAYED RELEASE ORAL at 08:34

## 2019-01-23 ASSESSMENT — ACTIVITIES OF DAILY LIVING (ADL)
ADLS_ACUITY_SCORE: 11

## 2019-01-23 ASSESSMENT — PAIN DESCRIPTION - DESCRIPTORS
DESCRIPTORS: HEADACHE
DESCRIPTORS: HEADACHE

## 2019-01-23 ASSESSMENT — MIFFLIN-ST. JEOR: SCORE: 1636.55

## 2019-01-23 NOTE — PROGRESS NOTES
Type of chemo infused: Cytoxan  Pt tolerated infusion: Well; nausea/ episode of emesis at end of transfusion  Interventions: Gave PRN compazine IV  Response to interventions used:  Good response/ nausea decreased  Plan:  Continue to monitor patient and treat nausea.

## 2019-01-23 NOTE — SUMMARY OF CARE
BMT Summary of Care    This note has data from a flowsheet    January 23, 2019 12:38 PM  Angel Yanez  MRN: [id number]    Discharge Date: 1/23/19    BMT Primary Physician: Jamarcus Lucio MD    BMT Nurse Coordinator: Su Pate RN    Discharge Diagnosis: S/P cytoxan chemopriming prior to autologous stem cell collection.     Discharge To: home    Activity: as tolerated    Catheter Care: Hsieh - Flush each line with 5mL of 10 unit/mL heparin every 24 hours    Vascular Access Device Protocol Per Policy  10 unit heparin (5ml flush to each lumen. Will be done by BMT staff when in clinic but will need to flush your line 1/24/19.     Nutrition: Regular diet as tolerated    Blood Transfusions:  Transfuse if Hemoglobin < or equal 8.0 g/dL  Red Blood Cell Order: 2 units, irradiated and leukoreduced   Transfuse if Platelet count < or equal 10k uL  Platelet order: 1 adult dose, irradiated and leukoreduced    Intravenous Electrolyte Replacement:  Potassium  chloride (give only if serum creatinine < 2)     3-3.3  20mEq/hr  over 1 hour x 2 doses     <3      20mEq/hr  over 1 hour x 3 doses  Magnesium sulfate 1.3-1.7     2 grams over 1 hour x1 dose                                     <1.3         2 grams over 2 hours x1 dose    Outpatient Pharmacy:  G-CSF to be given in clinic: (dose) 1/26/19 900mcg gcsf daily through stem cell collections.     Laboratory Tests:  At next clinic appointment (date: 1/25/19)  Hemogram (CBC) differential, platelet count  Magnesium  Comprehensive Metabolic Panel    Support Services:  None    Appointments:   BMT Clinic (date, time, provider): 1/25/19 8am for labs and 8:50 with Mony Pitts PA-C  1/26/19 and 1/27 for labs at 8am Gcsf and 8:30 appt with Dr Darnell

## 2019-01-23 NOTE — PROGRESS NOTES
"BMT Daily Progress Note     Angel Yanez is a 58 year old male with IgA Kappa MM - Admitted for cytoxan chemo-priming prior stem cell collections for 2nd autologous pbsct.     Overnight events:   Guille tolerated cytoxan okay - however at the end of infusion he did get nauseated and vomit. He felt much better after compazine. Able to sleep the rest of the night. No further vomiting this morning- tolerated breakfast.   Review of Systems: 10 point ROS negative except as noted above.    Physical Exam:   Blood pressure 125/85, pulse 75, temperature 96.7  F (35.9  C), temperature source Axillary, resp. rate 16, height 1.75 m (5' 8.9\"), weight 83.8 kg (184 lb 11.2 oz), SpO2 97 %.  General: NAD   Eyes: : FARRAH, sclera anicteric   Nose/Mouth/Throat: OP clear, buccal mucosa moist, no ulcerations   Lungs: CTA bilaterally  Cardiovascular: RRR, no M/R/G   Abdominal/Rectal: +BS, soft, NT, ND, No HSM   Lymphatics: no edema  Skin:   Neuro: A&O   Additional Findings: Hsieh site NT, no drainage. Slight swelling at line tunneling site. No bruising, NT.   Labs:  Lab Results   Component Value Date    WBC 3.5 (L) 01/23/2019    ANEU 2.6 01/23/2019    HGB 12.4 (L) 01/23/2019    HCT 37.5 (L) 01/23/2019     (L) 01/23/2019     01/23/2019    POTASSIUM 4.2 01/23/2019    CHLORIDE 104 01/23/2019    CO2 26 01/23/2019     (H) 01/23/2019    BUN 13 01/23/2019    CR 0.96 01/23/2019    MAG 1.8 01/23/2019    INR 1.02 01/22/2019         Imaging: Reviewed  Microbiology:  Reviewed    Assessment and Plan:   Angel Yanez is a 58 year old male with IgA Kappa MM - Admitted for cytoxan chemo-priming prior to 2nd autologous pbsct.      1.  BMT:  Chemopriming prior to stem cell collections for 2nd Autologous PBSCT for MM (first tx 2005 - did not collect for 2 transplants at that time). Repeating Auto given long disease free duration (no detectable disease 1653-8725)  1/22/19: Cytoxan    900mcg GCSF starts 72 hr post cytoxan (1/26/19)and " continue through stem cell collections. Goal 5x10^6 cd34/kg     2.  HEME: Keep Hgb>8 and plts>10K. No pre-meds.                            3.  ID: Afebrile.   - bacterial prophy: levaquin 250mg po daily as anticipated neutropenic- continue through collections.  -Fungal prophy: start fluconazole 200mg daily   - Continue LD Acy 400mg PO BID - will transition to 800 5x daily post transplant given CMV+                                        4.  GI: Zofran and dexamethasone prior to chemo to prevent N/V.   - Post cytoxan emesis last night but doing better today.   - Discharge with zofran 4mg ODT - to take 4-8mg q8hrs prn + 5-10mg compazine q6hr prn for nausea. Encouraged to take zofran qAM until few days without any nausea.   Of note: no ativan as hx of hives with it  COntinue  Protonix for GI prophy (was on this at baseline for GERD).      5.  FEN/Renal: Monitor creat and lytes.  Cr stable 0.9. Na and K also been stable.      6.) HTN: seems quite hypertensive today but just had line placement. WHite coat? Normotensive overnight 100/70's  -Hx of hyperlipidemia, not currently on tx.         JESICA BeltranC  [phone]     Attending Summary  The patient was seen and examined by me separate from the midlevel provider.The note above reflects my assessment and plan. I have personally reviewed today's labs,vital and radiology results. The points of care that were added by me are:     History and physical  Completed Cytoxan for mobilization.       On exam:  Head/mouth/neck: Oropharynx clear.  No lymphadenopathy.  Heart: Regular rate and rhythm.  No murmurs rubs or gallops.  Lungs: Lungs are clear bilaterally.  Abdomen: Abdomen is soft nontender nondistended.  Intact bowel sounds.  Ext: No edema.  Skin: No rash.     Pertinent Labs:  Reviewed     ASSESSMENT AND PLAN  1.  Multiple Myeloma:  Completed Cytoxan mobilization.  2.  Chemotherapy induced nausea:  Antiemetics ordered today and upon discharge.     Anuel Lanier,  MD

## 2019-01-23 NOTE — PLAN OF CARE
Patient admitted today for chemotherapy infusion (Primer). He had line placed today. Site was tender- given Tylenol x1. He is afebrile, BP slightly elevated during admission. Current BP now wnl 122/87. Will start Cytoxan at 1900. Continue with plan of care.

## 2019-01-23 NOTE — DISCHARGE SUMMARY
[facility] Discharge Summary   Angel Yanez MRN# [id number]   Age: 58 year old  YOB: 1960   Date of Admission: 1/22/2019  Date of Discharge:  1/23/19  Admitting Physician: Adebayo Lucio MD  Discharge Physician:  Anuel Lanier  Discharge Diagnoses:    1.) IgA Kappa MM  2.) Chemotherapy induced nausea and vomiting  3.) Steroid induced hypertension   4.) Chemotherapy and Malignancy induced pancytopenia    Discharge Medications:       Angel Yanez   Home Medication Instructions [id number]:[id number]    Printed on:01/23/19 3890   Medication Information                      acetaminophen (TYLENOL) 325 MG tablet  Take 1-2 tablets (325-650 mg) by mouth every 4 hours as needed for mild pain             acyclovir (ZOVIRAX) 400 MG tablet  Take 400 mg by mouth every 12 hours              amoxicillin (AMOXIL) 500 MG capsule  Take 500 mg by mouth once as needed (Take Prior To Dental Procedures)              calcipotriene (DOVONOX) 0.005 % SOLN solution  Apply topically 2 times daily as needed              calcium carbonate (CALCIUM CARBONATE) 600 MG tablet  Take 2 tablets by mouth daily              Cholecalciferol (VITAMIN D3) 2000 units CAPS  Take 2,000 Units by mouth daily              filgrastim (NEUPOGEN) 300 MCG/ML injection  Will get daily in BMT clinic starting 1/26 and continue daily through stem cell collections             fluconazole (DIFLUCAN) 200 MG tablet  Take 1 tablet (200 mg) by mouth daily             levofloxacin (LEVAQUIN) 250 MG tablet  Take 1 tablet (250 mg) by mouth daily for 10 days             omeprazole (PRILOSEC OTC) 20 MG tablet  Take 20 mg by mouth daily              ondansetron (ZOFRAN-ODT) 4 MG ODT tab  Take 1-2 tablets (4-8 mg) by mouth every 8 hours as needed for nausea             prednisoLONE acetate (PRED FORTE) 1 % ophthalmic susp  Place 1-2 drops into both eyes every 4 hours as needed for dry eyes              prochlorperazine (COMPAZINE) 5 MG tablet  Take 1-2  tablets (5-10 mg) by mouth every 6 hours as needed for nausea or vomiting               Brief History of Illness:    **Adopted from H&P  1.  BMT:  Chemopriming prior to stem cell collections for 2nd Autologous PBSCT for MM (first tx 2005 - did not collect for 2 transplants at that time). Repeating Auto given long disease free duration (no detectable disease 6661-6356)  1/22/19: Cytoxan    900mcg GCSF starts 72 hr post cytoxan (1/26/19)and continue through stem cell collections. Goal 5x10^6 cd34/kg     2.  HEME: Keep Hgb>8 and plts>10K. No pre-meds.     3.  ID: Afebrile.   - bacterial prophy: levaquin 250mg po daily as anticipated neutropenic- continue through collections.  -Fungal prophy: start fluconazole 200mg daily   - Continue LD Acy 400mg PO BID - will transition to 800 5x daily post transplant given CMV+               4.  GI: Zofran and dexamethasone prior to chemo to prevent N/V.   - Post cytoxan emesis last night but doing better today.   - Discharge with zofran 4mg ODT - to take 4-8mg q8hrs prn + 5-10mg compazine q6hr prn for nausea. Encouraged to take zofran qAM until few days without any nausea.   Of note: no ativan as hx of hives with it  COntinue  Protonix for GI prophy (was on this at baseline for GERD).      5.  FEN/Renal: Monitor creat and lytes.  Cr stable 0.9. Na and K also been stable.      6.) HTN: seems quite hypertensive today but just had line placement. WHite coat? Normotensive overnight 100/70's  -Hx of hyperlipidemia, not currently on tx.         Hospital Course:    Guille tolerated awais line placement in IR well. He did have blood saturated biopatch x2 but no active bleeding noted at time of dressing change. Line placement site is not sore but there is mild swelling at tunneling site. He tolerated IV fluid flush from cytoxan without issue, no edema. He did have emesis x1 at the end of cytoxan infusion. N/V improved today prior to discharge. Please see plan for routine chemotherapy plans.     1.  BMT:  Chemopriming prior to stem cell collections for 2nd Autologous PBSCT for MM (first tx 2005 - did not collect for 2 transplants at that time). Repeating Auto given long disease free duration (no detectable disease 4116-3288)  1/22/19: Cytoxan    900mcg GCSF starts 72 hr post cytoxan (1/26/19)and continue through stem cell collections. Goal 5x10^6 cd34/kg     2.  HEME: Keep Hgb>8 and plts>10K. No pre-meds.     3.  ID: Afebrile.   - bacterial prophy: levaquin 250mg po daily as anticipated neutropenic- continue through collections.  -Fungal prophy: start fluconazole 200mg daily   - Continue LD Acy 400mg PO BID - will transition to 800 5x daily post transplant given CMV+               4.  GI: Zofran and dexamethasone prior to chemo to prevent N/V.   - Post cytoxan emesis last night but doing better today.   - Discharge with zofran 4mg ODT - to take 4-8mg q8hrs prn + 5-10mg compazine q6hr prn for nausea. Encouraged to take zofran qAM until few days without any nausea.   Of note: no ativan as hx of hives with it  COntinue  Protonix for GI prophy (was on this at baseline for GERD).      5.  FEN/Renal: Monitor creat and lytes.  Cr stable 0.9. Na and K also been stable.      6.) HTN: seems quite hypertensive today but just had line placement. WHite coat? Normotensive overnight 100/70's  -Hx of hyperlipidemia, not currently on tx.     CODE STATUS: Full Code  Discharge Instructions and Follow-Up:    Discharge diet: Regular diet as tolerated  Discharge activity: Activity as tolerated   Discharge follow-up: Follow up with BMT Clinic as follows:1/25/19 8am for labs, 8:50 provider visit. 1/26 and 1/27 8am labs, gcsf and 8:30 with Dr Darnell.     Discharge Disposition:    Discharged to home.      Myrna Marrufo PA-C  [phone]    Attending Summary  I saw and examined the patient and agree with the details noted above.  Total time in coordinating discharge plan was <30 minutes.

## 2019-01-23 NOTE — PLAN OF CARE
Afebrile; VSS.  No c/o pain, except around 0700 c/o headache; gave tylenol.  Had one episode of emesis right as cytoxan finished.  Gave IV compazine and no complaints since.  Flush will continue until 2130 tonight.  Did not meet shift total for urine at 2300; gave 20 of lasix due to being up 1.5 L fluid.  Мария was moderately bloody under dressing; completed a dressing change and now site look CDI.  No replacements needed from this mornings lab draws.  Plan is to discharge home later today. Continue with plan of cares.     Adult Inpatient Plan of Care  Plan of Care Review  1/23/2019 0615 - No Change by Anila Tse RN     Adult Inpatient Plan of Care  Patient-Specific Goal (Individualization)  1/23/2019 0615 - No Change by Anial Tse RN     Adult Inpatient Plan of Care  Absence of Hospital-Acquired Illness or Injury  1/23/2019 0615 - No Change by Anila Tse RN     Adult Inpatient Plan of Care  Optimal Comfort and Wellbeing  1/23/2019 0615 - No Change by Anila Tse RN     Adult Inpatient Plan of Care  Readiness for Transition of Care  1/23/2019 0615 - No Change by Anila Tse RN     Adult Inpatient Plan of Care  Rounds/Family Conference  1/23/2019 0615 - No Change by Anila Tse RN     Diarrhea (Stem Cell/Bone Marrow Transplant)  Diarrhea Symptom Control  1/23/2019 0615 - No Change by Anila Tse RN     Infection Risk (Stem Cell/Bone Marrow Transplant)  Absence of Infection Signs/Symptoms  1/23/2019 0615 - No Change by Anila Tse RN     Mucositis (Stem Cell/Bone Marrow Transplant)  Mucous Membrane Health and Integrity  1/23/2019 0615 - No Change by Anila Tse RN     Nausea and Vomiting (Stem Cell/Bone Marrow Transplant)  Nausea and Vomiting Symptom Relief  1/23/2019 0615 - No Change by Anila Tse RN

## 2019-01-23 NOTE — SUMMARY OF CARE
Angel Yanez   Home Medication Instructions [id number]    Printed on:01/23/19 3736   Medication Information                      acetaminophen (TYLENOL) 325 MG tablet  Take 1-2 tablets (325-650 mg) by mouth every 4 hours as needed for mild pain             acyclovir (ZOVIRAX) 400 MG tablet  Take 400 mg by mouth every 12 hours              amoxicillin (AMOXIL) 500 MG capsule  Take 500 mg by mouth once as needed (Take Prior To Dental Procedures)   Please tell BMT if you plan on having any dental work done           calcipotriene (DOVONOX) 0.005 % SOLN solution  Apply topically 2 times daily as needed              calcium carbonate (CALCIUM CARBONATE) 600 MG tablet  Take 2 tablets by mouth daily              Cholecalciferol (VITAMIN D3) 2000 units CAPS  Take 2,000 Units by mouth daily              filgrastim (NEUPOGEN) 900 MCG injection  Will get daily in BMT clinic starting 1/26 and continue daily through stem cell collections             fluconazole (DIFLUCAN) 200 MG tablet  Take 1 tablet (200 mg) by mouth daily             levofloxacin (LEVAQUIN) 250 MG tablet  Take 1 tablet (250 mg) by mouth daily for 10 days  (do not take with calcium as limits absorption of this medication)            omeprazole (PRILOSEC OTC) 20 MG tablet  Take 20 mg by mouth daily              ondansetron (ZOFRAN-ODT) 4 MG ODT tab  Take 1-2 tablets (4-8 mg) by mouth every 8 hours as needed for nausea  You have been taking 8mg every 8 hrs while in the hospital.            prednisoLONE acetate (PRED FORTE) 1 % ophthalmic susp  Place 1-2 drops into both eyes every 4 hours as needed for dry eyes              prochlorperazine (COMPAZINE) 10 MG tablet  Take 0.5-1 tablet (5-10 mg) by mouth every 6 hours as needed for nausea or vomiting

## 2019-01-24 ENCOUNTER — APPOINTMENT (OUTPATIENT)
Dept: LAB | Facility: CLINIC | Age: 59
End: 2019-01-24
Attending: INTERNAL MEDICINE
Payer: COMMERCIAL

## 2019-01-24 ENCOUNTER — ONCOLOGY VISIT (OUTPATIENT)
Dept: TRANSPLANT | Facility: CLINIC | Age: 59
End: 2019-01-24
Attending: INTERNAL MEDICINE
Payer: COMMERCIAL

## 2019-01-24 ENCOUNTER — ANCILLARY PROCEDURE (OUTPATIENT)
Dept: ULTRASOUND IMAGING | Facility: CLINIC | Age: 59
End: 2019-01-24
Payer: COMMERCIAL

## 2019-01-24 VITALS
DIASTOLIC BLOOD PRESSURE: 85 MMHG | RESPIRATION RATE: 16 BRPM | TEMPERATURE: 97.6 F | HEART RATE: 102 BPM | HEIGHT: 69 IN | WEIGHT: 184.9 LBS | OXYGEN SATURATION: 98 % | SYSTOLIC BLOOD PRESSURE: 158 MMHG | BODY MASS INDEX: 27.39 KG/M2

## 2019-01-24 DIAGNOSIS — C90.00 MULTIPLE MYELOMA NOT HAVING ACHIEVED REMISSION (H): ICD-10-CM

## 2019-01-24 DIAGNOSIS — C90.01 MULTIPLE MYELOMA IN REMISSION (H): Primary | ICD-10-CM

## 2019-01-24 DIAGNOSIS — C90.01 MULTIPLE MYELOMA IN REMISSION (H): ICD-10-CM

## 2019-01-24 LAB
ALBUMIN SERPL-MCNC: 3.5 G/DL (ref 3.4–5)
ALP SERPL-CCNC: 69 U/L (ref 40–150)
ALT SERPL W P-5'-P-CCNC: 18 U/L (ref 0–70)
ANION GAP SERPL CALCULATED.3IONS-SCNC: 6 MMOL/L (ref 3–14)
AST SERPL W P-5'-P-CCNC: 18 U/L (ref 0–45)
BASE EXCESS BLDA CALC-SCNC: 0 MMOL/L
BASOPHILS # BLD AUTO: 0 10E9/L (ref 0–0.2)
BASOPHILS NFR BLD AUTO: 0.1 %
BILIRUB SERPL-MCNC: 0.4 MG/DL (ref 0.2–1.3)
BUN SERPL-MCNC: 19 MG/DL (ref 7–30)
CALCIUM SERPL-MCNC: 9 MG/DL (ref 8.5–10.1)
CHLORIDE SERPL-SCNC: 108 MMOL/L (ref 94–109)
CO2 SERPL-SCNC: 27 MMOL/L (ref 20–32)
COHGB MFR BLD: 0.8 % (ref 0–2)
COPATH REPORT: NORMAL
CREAT SERPL-MCNC: 0.99 MG/DL (ref 0.66–1.25)
DIFFERENTIAL METHOD BLD: ABNORMAL
EOSINOPHIL # BLD AUTO: 0 10E9/L (ref 0–0.7)
EOSINOPHIL NFR BLD AUTO: 0.1 %
ERYTHROCYTE [DISTWIDTH] IN BLOOD BY AUTOMATED COUNT: 13.7 % (ref 10–15)
GFR SERPL CREATININE-BSD FRML MDRD: 82 ML/MIN/{1.73_M2}
GLUCOSE SERPL-MCNC: 103 MG/DL (ref 70–99)
HCO3 BLD-SCNC: 23 MMOL/L (ref 21–28)
HCT VFR BLD AUTO: 37.8 % (ref 40–53)
HGB BLD-MCNC: 12.7 G/DL (ref 13.3–17.7)
HGB BLD-MCNC: 14.1 G/DL (ref 13.3–17.7)
IMM GRANULOCYTES # BLD: 0 10E9/L (ref 0–0.4)
IMM GRANULOCYTES NFR BLD: 0.5 %
LYMPHOCYTES # BLD AUTO: 0.6 10E9/L (ref 0.8–5.3)
LYMPHOCYTES NFR BLD AUTO: 7.2 %
MAGNESIUM SERPL-MCNC: 2.2 MG/DL (ref 1.6–2.3)
MCH RBC QN AUTO: 34.9 PG (ref 26.5–33)
MCHC RBC AUTO-ENTMCNC: 33.6 G/DL (ref 31.5–36.5)
MCV RBC AUTO: 104 FL (ref 78–100)
METHGB MFR BLD: 0.1 % (ref 0–3)
MONOCYTES # BLD AUTO: 1.2 10E9/L (ref 0–1.3)
MONOCYTES NFR BLD AUTO: 13.2 %
NEUTROPHILS # BLD AUTO: 6.9 10E9/L (ref 1.6–8.3)
NEUTROPHILS NFR BLD AUTO: 78.9 %
NRBC # BLD AUTO: 0 10*3/UL
NRBC BLD AUTO-RTO: 0 /100
O2/TOTAL GAS SETTING VFR VENT: 21 %
OXYHGB MFR BLD: 96 % (ref 92–100)
PCO2 BLD: 36 MM HG (ref 35–45)
PH BLD: 7.43 PH (ref 7.35–7.45)
PLATELET # BLD AUTO: 166 10E9/L (ref 150–450)
PO2 BLD: 90 MM HG (ref 80–105)
POTASSIUM SERPL-SCNC: 3.9 MMOL/L (ref 3.4–5.3)
PROT SERPL-MCNC: 7.1 G/DL (ref 6.8–8.8)
RBC # BLD AUTO: 3.64 10E12/L (ref 4.4–5.9)
SODIUM SERPL-SCNC: 141 MMOL/L (ref 133–144)
WBC # BLD AUTO: 8.8 10E9/L (ref 4–11)

## 2019-01-24 PROCEDURE — 83735 ASSAY OF MAGNESIUM: CPT | Performed by: PHYSICIAN ASSISTANT

## 2019-01-24 PROCEDURE — 36415 COLL VENOUS BLD VENIPUNCTURE: CPT

## 2019-01-24 PROCEDURE — 80053 COMPREHEN METABOLIC PANEL: CPT | Performed by: PHYSICIAN ASSISTANT

## 2019-01-24 PROCEDURE — 85025 COMPLETE CBC W/AUTO DIFF WBC: CPT | Performed by: PHYSICIAN ASSISTANT

## 2019-01-24 PROCEDURE — G0463 HOSPITAL OUTPT CLINIC VISIT: HCPCS | Mod: ZF

## 2019-01-24 PROCEDURE — G0463 HOSPITAL OUTPT CLINIC VISIT: HCPCS

## 2019-01-24 RX ORDER — HEPARIN SODIUM,PORCINE 10 UNIT/ML
5 VIAL (ML) INTRAVENOUS ONCE
Status: DISCONTINUED | OUTPATIENT
Start: 2019-01-24 | End: 2019-01-24 | Stop reason: HOSPADM

## 2019-01-24 RX ORDER — HEPARIN SODIUM (PORCINE) LOCK FLUSH IV SOLN 100 UNIT/ML 100 UNIT/ML
5 SOLUTION INTRAVENOUS ONCE
Status: COMPLETED | OUTPATIENT
Start: 2019-01-24 | End: 2019-01-24

## 2019-01-24 RX ADMIN — HEPARIN SODIUM (PORCINE) LOCK FLUSH IV SOLN 100 UNIT/ML 5 ML: 100 SOLUTION at 11:51

## 2019-01-24 ASSESSMENT — ANXIETY QUESTIONNAIRES
7. FEELING AFRAID AS IF SOMETHING AWFUL MIGHT HAPPEN: NOT AT ALL
GAD7 TOTAL SCORE: 5
5. BEING SO RESTLESS THAT IT IS HARD TO SIT STILL: NOT AT ALL
3. WORRYING TOO MUCH ABOUT DIFFERENT THINGS: SEVERAL DAYS
1. FEELING NERVOUS, ANXIOUS, OR ON EDGE: SEVERAL DAYS
6. BECOMING EASILY ANNOYED OR IRRITABLE: SEVERAL DAYS
IF YOU CHECKED OFF ANY PROBLEMS ON THIS QUESTIONNAIRE, HOW DIFFICULT HAVE THESE PROBLEMS MADE IT FOR YOU TO DO YOUR WORK, TAKE CARE OF THINGS AT HOME, OR GET ALONG WITH OTHER PEOPLE: SOMEWHAT DIFFICULT
2. NOT BEING ABLE TO STOP OR CONTROL WORRYING: SEVERAL DAYS

## 2019-01-24 ASSESSMENT — PATIENT HEALTH QUESTIONNAIRE - PHQ9
SUM OF ALL RESPONSES TO PHQ QUESTIONS 1-9: 5
5. POOR APPETITE OR OVEREATING: SEVERAL DAYS

## 2019-01-24 ASSESSMENT — PAIN SCALES - GENERAL: PAINLEVEL: NO PAIN (0)

## 2019-01-24 ASSESSMENT — MIFFLIN-ST. JEOR: SCORE: 1637.49

## 2019-01-24 NOTE — NURSING NOTE
"Oncology Rooming Note    January 24, 2019 10:45 AM   Angel Yanez is a 58 year old male who presents for:    Chief Complaint   Patient presents with     RECHECK     Return: Multiple myeloma      Initial Vitals: /85 (BP Location: Left arm, Patient Position: Sitting, Cuff Size: Adult Regular)   Pulse 102   Temp 97.6  F (36.4  C) (Oral)   Resp 16   Ht 1.75 m (5' 8.9\")   Wt 83.9 kg (184 lb 14.4 oz)   SpO2 98%   BMI 27.38 kg/m   Estimated body mass index is 27.38 kg/m  as calculated from the following:    Height as of this encounter: 1.75 m (5' 8.9\").    Weight as of this encounter: 83.9 kg (184 lb 14.4 oz). Body surface area is 2.02 meters squared.  No Pain (0) Comment: Data Unavailable   No LMP for male patient.  Allergies reviewed: Yes  Medications reviewed: Yes    Medications: Medication refills not needed today.  Pharmacy name entered into EPIC:    [facility] - [place], [place] - [address] [facility] - [place], [place] - [address] [facility]    Clinical concerns: N/A     5 minutes for nursing intake (face to face time)     Yesica Duncan CMA              "

## 2019-01-24 NOTE — PROGRESS NOTES
Pt discharged to:home   Via:ambulatory  Accompanied by:spouse  Belongings:sent with pt  Teaching:discharge instruction reviewed with pt  Clinic appointment:1/25 at BMT clinic    Pt informed of calling in if CVC insertion site continues to ooz.

## 2019-01-24 NOTE — NURSING NOTE
Chief Complaint   Patient presents with     RECHECK     Return: Multiple myeloma      Blood Draw     labs drawn via cvc by rn. vs taken. Line was not flushed with saline or heparin due to air in line. clinic notified.      CVC accessed and labs drawn by RN in lab.Line was not flushed with saline or heparin after labs drawn due to air in brown lumen in line. Clinic notified. Bleeding around insertion site of line.  VS taken. Pt checked in for next appointment.   Parris Lua RN

## 2019-01-24 NOTE — PLAN OF CARE
Afebrile, VSS. Pt denied nausea. Occasional headache, Tylenol given with relief. Lasix 10mg iv given for not meeting voiding parameter with good response. [place] continuous infusion completed at 5:20pm and Cytoxan flush ended at 6pm. Encouraged pt take sufficient fluid till tomorrow.   Central line insertion site continues to ooz with diluted blood, diffuse swelling noted along catheter. Pt denied pain or tenderness. Moonlighter and IR called. Chest x-ray obtained. IR suggested to watch over night at home and call in if oozing continues. Dressing changed x3 today.   Pt discharged to home.

## 2019-01-24 NOTE — PROGRESS NOTES
Patient with history of multiple myeloma status post apheresis capable double-lumen tunneled central venous catheter placement 2 days ago (1/22/2019) with ongoing serous leakage from the skin exit site saturating dressings requiring dressing changes multiple times per day.  Also, during lab draw this morning air was noted in the syringe concerning the technologist prompting BMT team to notify IR and request a tunneled central venous catheter check.    Initial inspection of existing right tunneled catheter shows serous stains on the patient's T-shirt at the skin exit site as well as the dermatotomy site.  The chlorhexidine sponge is saturated, but no serous fluid is seen outside of the dressing or alongside the external portion of the catheter.  Dressing was removed and each lumen of the catheter was checked and found to be well functioning without any signs of defect in either lumen.  There was no air aspirated nor increased leakage around the catheter during flushing.  The catheter was heparin locked and a new dressing reinforced with gauze was applied.  During the 5 minutes that the dressing had been removed there was no active serous drainage from the skin exit site.    This is likely within normal limits of postprocedural inflammation along with tunnel and should subside over time.  The patient admits to shoveling his driveway this morning which may have exacerbated serous leakage.  Patient instructed to return home and avoid strenuous activity and apply ice packs as needed to decrease inflammation.  He may require daily dressing changes if serous leakage is ongoing.  Ongoing serous leakage does not warrant a catheter removal or exchange, however could be considered once stem cells have been collected in the next 1-2 weeks.    15 minutes face-to-face time spent with the patient    Aj Mancera PA-C  Interventional Radiology  [phone]

## 2019-01-24 NOTE — PROGRESS NOTES
Blood and Marrow Transplant   Psychosocial Assessment with   Clinical     Assessment completed on 1/15/2019 of living situation, support system, financial status, functional status, coping, stressors, need for resources and social work intervention provided as needed. Information for this assessment was provided by pt's report in addition to medical chart review and consultation with medical team.     Present at assessment:   Patient: Angel Yanez  : ZUHAIR Woodard, PAWEL    Diagnosis: Multiple Myeloma    Transplant type: Autologous    Donor: Autologous     Physician: Adebayo Lucio MD    Nurse Coordinator: Su Pate RN    Permanent Address:   [address] [place] [id number]    Contact Information:  Pt's Home Phone: [phone]  Pt's Cell Phone: [phone]  Pt Email: [email or web address]  Wife-Jennifer Yanez's Phone: [phone]  Son-Dalton Yanez's Phone: [phone]    Presenting Information:  Zaheer is a 58 year old male diagnosed with multiple myeloma who presents for evaluation for autologous transplant at the [facility] ([facility] This will be pt's 2nd autologous transplant at the [facility].    Decision Making: Self    Health Care Directive: No. Pt has completed the healthcare directive; however, he needs it notarized.     Relationship Status: . Pt described relationship as stable and supportive.     Special Needs: None identified at this time.     Family/Support System: Pt endorsed a good support system including family and close friends who will be available to support pt throughout transplant process.     Spouse: Jennifer Yanez  Children: 3 Children; Son-Alivia Yanez (28-Lives in [place], [place]), Son-Magdiel Yanez (Lives in [place], [place]) and Daughter-Jerry Yanez (Lives in [place])  Grandchildren: 1 Grandson-Alivia Tyler (1.5 years old)  Parents:   Siblings: 1 Sister-Sofie Calvin (Lives in [place], [place])    Caregiver: JANELL  "discussed with pt the caregiver role and expectation at length. Pt said he won t have a caregiver 24/7 but will tell me he does if that is required. His wife runs a small  out of the home, so she is there most of the day. Mr. Yanez said he can put his sonLucille down as well  if Social Work needed another name on the caregiver contract . Throughout the conversation, I repeatedly reminded pt he is required to have a caregiver 24/7 for 30 days. Pt signed the caregiver contract which will be scanned into the EMR. Pt said his wife-Jennifer and son-Dalton will be the caregivers. Caregiver education and resources provided. JANELL notified Dr. Lucio, EULALIO Crenshaw, and JANELL-Janine. Dr. Lucio said he would reinforce the caregiver requirement during the close appointment.      Caregiver Contact Information:  WifeTessie Ynaez's Phone: [phone]  SonKiera Yanez's Phone: [phone]    Transportation Mode: Private Vehicle. Pt said he will be driving himself to clinic post-transplant. I thoroughly explained to pt that he cannot drive post-transplant until approved by a Physician. JANELL notified Dr. Lucio, EULALIO Crenshaw, and JANELL-Janine. Dr. Lucio said he would reinforce the \"no driving\" during the close appointment as well.     Insurance: Pt has [employer] health insurance. Pt denied specific insurance concerns at this time. JANELL reiterated information about the [employer]  should specific insurance questions arise as pt moves through transplant process. Future Insurance questions referred to [employer] -Jessica Downs (P: [phone])     Sources of Income: Pt is supported by short term disability and his wife's income. Pt denied anticipation of financial hardship related to BMT at this time. SW provided information on gay options and encouraged pt to contact this SW for support should financial situation change.     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 Pt's " "wife-Jennifer owns her own  at home.     Mental Health: Pt denied a history of mental health concerns, specific diagnoses or medications at this time. SW explained that it's not uncommon for patients going through transplant to experience symptoms of depression/anxiety.     PHQ-9:  Pt scored a 5 which indicates mild sign of depression on the depression severity scale. Pt endorses feeling symptoms of depression at times.     GAD7:  Pt scored a 5 which indicates mild sign of anxiety on the Generalized Anxiety Disorder Questionnaire. Pt endorses this is an accurate reflection of his emotional state.    Chemical Use: Pt reported that he quit smoking 35 years ago. Pt reported minimal alcohol consumption; 1-2 beers a week. Pt reported no hx of marijuana or other drugs. Based on the information provided, there appear to be no specific risks or concerns identified at this time.     Trauma/Loss/Abuse History: Multiple losses associated with cancer diagnosis and treatment, including health, employment, changes to physical appearance, etc.     Spirituality: Pt would not like a blessing ceremony while inpatient. SW explained that there are Chaplains on the unit and pt can request to meet with a  at anytime.    Coping: Pt noted that he is currently feeling \"depressed, positive, fearful, sad, hopeful, worried, prepared, nervous, frustrated, excited, ready to begin, less interested in usual activities and focused\". Pt said he has all the feelings at any given time going through this again. Pt shared that his main coping mechanisms are exercise (riding bike) and working on my hobby. SW and pt discussed additional positive coping mechanisms that pt can utilize while in the hospital. While hospitalized, pt plans to walk the hallways and watch tv.     Education Provided: Transplant process expectations, Caregiver requirements, Caregiver self-care, Financial issues related to transplant, Financial resources/grants " available, Common psychosocial stressors pre/post transplant, Support group(s) available, Hospital resources available, Social Work role and Resources for grandchild.    Interventions Provided: Psychosocial Support and Education     Assessment and Recommendations for Team:  Pt is a 58 year old male diagnosed with multiple myeloma who is here undergoing preparation for a planned autologous transplant. Pt appeared to be in good spirits during conversation. Pt is pleasant and able to articulate concerns/coping mechanisms in an appropriate manner. Pt feels comfortable communicating with the medical team. Pt has a good supportive network of family and friends who are involved. Pt has developed a few coping mechanisms such as exercising and working on his hobbies. Pt may benefit from assistance in developing coping mechanisms. Pt and pt's family will benefit from ongoing psychosocial support in regards to coping with the adjustment to the BMT process.     Pt has a good support system. Please see caregiver section for update. Pt verbalizes understanding of the transplant process and wanting to proceed. SW provided contact information and encouraged pt to contact SW with questions, concerns, resources and for support.    Per this assessment, SW did not identify any barriers to this patient moving forward with transplant.    Important Information:   -See caregiver section. Check in with pt while inpatient to confirm plant.  -Pt said he plans to drive to clinic appointments post-transplant. Dr. Lucio notified and Dr. Lucio was going to tell pt he cannot drive.    Follow up Planned:   Psychosocial support  Healthcare Directive - Pt has completed the healthcare directive; however, he needs it notarized.     Christie MOFFETT, LGSW  Phone: [phone]  Pager: [phone]

## 2019-01-24 NOTE — PROGRESS NOTES
"BMT Progress Note      Angel Yanez is a 58 year old male with IgA Kappa MM - Admitted for cytoxan chemo-priming prior stem cell collections for 2nd autologous pbsct.      HPI: Guille was asked to present to clinic today to check on his line with IR, otherwise feeling well. He notes some bleeding around line while driving his wife to the airport. Now in clinic he notes it was probably only one drop of light pink blood on his shirt, otherwise the dressing window is not significantly bloody and he states hasn't been otherwise oozing. Line is a little tender, no worsening swelling since he discharged from the hospital yesterday. No fevers, chills, or other infectious symptoms. Eating fine, he denies n/v/d since leaving the hospital.    Review of Systems: 10 point ROS negative except as noted above.     Physical Exam:   /85 (BP Location: Left arm, Patient Position: Sitting, Cuff Size: Adult Regular)   Pulse 102   Temp 97.6  F (36.4  C) (Oral)   Resp 16   Ht 1.75 m (5' 8.9\")   Wt 83.9 kg (184 lb 14.4 oz)   SpO2 98%   BMI 27.38 kg/m      General: NAD   Eyes: : FARRAH, sclera anicteric   Nose/Mouth/Throat: OP clear, buccal mucosa moist, no ulcerations   Lungs: CTA bilaterally  Cardiovascular: RRR, no M/R/G   Abdominal/Rectal: +BS, soft, NT, ND, No HSM   Lymphatics: no edema  Skin: no rashes on exposed skin  Neuro: A&O   Additional Findings: Hsieh site NT, no drainage. Diluted appearing serosanguinous fluid in window dressing, slight swelling around tunnel. No discharge or tenderness. Skin is non erythematous. No bruising    Labs:  Lab Results   Component Value Date    WBC 8.8 01/24/2019     Lab Results   Component Value Date    RBC 3.64 01/24/2019     Lab Results   Component Value Date    HGB 12.7 01/24/2019     Lab Results   Component Value Date    HCT 37.8 01/24/2019     Lab Results   Component Value Date     01/24/2019     Lab Results   Component Value Date    MCH 34.9 01/24/2019     Lab Results "   Component Value Date    MCHC 33.6 01/24/2019     Lab Results   Component Value Date    RDW 13.7 01/24/2019     Lab Results   Component Value Date     01/24/2019     Last Comprehensive Metabolic Panel:  Sodium   Date Value Ref Range Status   01/24/2019 141 133 - 144 mmol/L Final     Potassium   Date Value Ref Range Status   01/24/2019 3.9 3.4 - 5.3 mmol/L Final     Chloride   Date Value Ref Range Status   01/24/2019 108 94 - 109 mmol/L Final     Carbon Dioxide   Date Value Ref Range Status   01/24/2019 27 20 - 32 mmol/L Final     Anion Gap   Date Value Ref Range Status   01/24/2019 6 3 - 14 mmol/L Final     Glucose   Date Value Ref Range Status   01/24/2019 103 (H) 70 - 99 mg/dL Final     Urea Nitrogen   Date Value Ref Range Status   01/24/2019 19 7 - 30 mg/dL Final     Creatinine   Date Value Ref Range Status   01/24/2019 0.99 0.66 - 1.25 mg/dL Final     GFR Estimate   Date Value Ref Range Status   01/24/2019 82 >60 mL/min/[1.73_m2] Final     Comment:     Non  GFR Calc  Starting 12/18/2018, serum creatinine based estimated GFR (eGFR) will be   calculated using the Chronic Kidney Disease Epidemiology Collaboration   (CKD-EPI) equation.       Calcium   Date Value Ref Range Status   01/24/2019 9.0 8.5 - 10.1 mg/dL Final       Assessment and Plan    1.  BMT:  Day +3 s/p chemopriming prior to stem cell collections for 2nd Autologous PBSCT for MM (first tx 2005 - did not collect for 2 transplants at that time). Repeating Auto given long disease free duration (no detectable disease 1468-0016)  1/22/19: Cytoxan   900mcg GCSF starts 72 hr post cytoxan (1/26/19) and continue through stem cell collections. Goal 5x10^6 cd34/kg     2.  HEME: Keep Hgb>8 and plts>10K. No pre-meds.  - Dressing changed after IR evaluated, per their note wnl post procedural inflammation. Following.     3.  ID: Afebrile.   Of note, line associated infection with previous transplant 2005 per pt.   - Prophy: levaquin 250mg po  daily as anticipated neutropenic- continue through collections; started fluconazole 200mg daily 1/23. Continue LD Acy 400mg PO BID - will transition to 800 5x daily post transplant given CMV+               4.  GI: Zofran and dexamethasone prior to chemo to prevent N/V.   - Post cytoxan emesis, currently no n/v  - Discharge with zofran 4mg ODT - to take 4-8mg q8hrs prn + 5-10mg compazine q6hr prn for nausea. Encouraged to take zofran qAM until few days without any nausea.   Of note: no ativan as hx of hives with it  Continue  Protonix for GI prophy (was on this at baseline for GERD).      5.  FEN/Renal:  Cr stable 0.9. Na and K also stable.      6.) HTN: HTN (systolic more pronounced so still possibly white coat and pt was asked to urgently present to clinic, some stress around line issues). Will wait for calmer day  - Hx of hyperlipidemia, not currently on tx.       Plan: line changed with extra gauze. Pt will call with any further bleeding, pain or inability to flush line at home.   RTC Saturday for first day gcsf       Mony Pitts PAC  [phone]

## 2019-01-25 ASSESSMENT — ANXIETY QUESTIONNAIRES: GAD7 TOTAL SCORE: 5

## 2019-01-26 ENCOUNTER — ONCOLOGY VISIT (OUTPATIENT)
Dept: TRANSPLANT | Facility: CLINIC | Age: 59
End: 2019-01-26
Payer: COMMERCIAL

## 2019-01-26 ENCOUNTER — APPOINTMENT (OUTPATIENT)
Dept: LAB | Facility: CLINIC | Age: 59
End: 2019-01-26
Payer: COMMERCIAL

## 2019-01-26 VITALS
TEMPERATURE: 98.1 F | SYSTOLIC BLOOD PRESSURE: 157 MMHG | BODY MASS INDEX: 27.61 KG/M2 | HEART RATE: 107 BPM | WEIGHT: 186.4 LBS | OXYGEN SATURATION: 97 % | DIASTOLIC BLOOD PRESSURE: 94 MMHG

## 2019-01-26 DIAGNOSIS — C90.01 MULTIPLE MYELOMA IN REMISSION (H): Primary | ICD-10-CM

## 2019-01-26 DIAGNOSIS — C90.00 MULTIPLE MYELOMA NOT HAVING ACHIEVED REMISSION (H): ICD-10-CM

## 2019-01-26 LAB
ANION GAP SERPL CALCULATED.3IONS-SCNC: 6 MMOL/L (ref 3–14)
BASOPHILS # BLD AUTO: 0 10E9/L (ref 0–0.2)
BASOPHILS NFR BLD AUTO: 0.5 %
BUN SERPL-MCNC: 17 MG/DL (ref 7–30)
CALCIUM SERPL-MCNC: 8.6 MG/DL (ref 8.5–10.1)
CHLORIDE SERPL-SCNC: 107 MMOL/L (ref 94–109)
CO2 SERPL-SCNC: 28 MMOL/L (ref 20–32)
CREAT SERPL-MCNC: 0.88 MG/DL (ref 0.66–1.25)
DIFFERENTIAL METHOD BLD: ABNORMAL
EOSINOPHIL # BLD AUTO: 0.1 10E9/L (ref 0–0.7)
EOSINOPHIL NFR BLD AUTO: 3.5 %
ERYTHROCYTE [DISTWIDTH] IN BLOOD BY AUTOMATED COUNT: 13.1 % (ref 10–15)
GFR SERPL CREATININE-BSD FRML MDRD: >90 ML/MIN/{1.73_M2}
GLUCOSE SERPL-MCNC: 112 MG/DL (ref 70–99)
HCT VFR BLD AUTO: 39.7 % (ref 40–53)
HGB BLD-MCNC: 13 G/DL (ref 13.3–17.7)
IMM GRANULOCYTES # BLD: 0 10E9/L (ref 0–0.4)
IMM GRANULOCYTES NFR BLD: 0.5 %
LYMPHOCYTES # BLD AUTO: 0.3 10E9/L (ref 0.8–5.3)
LYMPHOCYTES NFR BLD AUTO: 14.6 %
MCH RBC QN AUTO: 33.7 PG (ref 26.5–33)
MCHC RBC AUTO-ENTMCNC: 32.7 G/DL (ref 31.5–36.5)
MCV RBC AUTO: 103 FL (ref 78–100)
MONOCYTES # BLD AUTO: 0.2 10E9/L (ref 0–1.3)
MONOCYTES NFR BLD AUTO: 11.1 %
NEUTROPHILS # BLD AUTO: 1.4 10E9/L (ref 1.6–8.3)
NEUTROPHILS NFR BLD AUTO: 69.8 %
NRBC # BLD AUTO: 0 10*3/UL
NRBC BLD AUTO-RTO: 0 /100
PLATELET # BLD AUTO: 131 10E9/L (ref 150–450)
POTASSIUM SERPL-SCNC: 3.7 MMOL/L (ref 3.4–5.3)
RBC # BLD AUTO: 3.86 10E12/L (ref 4.4–5.9)
SODIUM SERPL-SCNC: 141 MMOL/L (ref 133–144)
WBC # BLD AUTO: 2 10E9/L (ref 4–11)

## 2019-01-26 PROCEDURE — 85025 COMPLETE CBC W/AUTO DIFF WBC: CPT | Performed by: STUDENT IN AN ORGANIZED HEALTH CARE EDUCATION/TRAINING PROGRAM

## 2019-01-26 PROCEDURE — 25000128 H RX IP 250 OP 636: Mod: ZF | Performed by: PHYSICIAN ASSISTANT

## 2019-01-26 PROCEDURE — 96372 THER/PROPH/DIAG INJ SC/IM: CPT

## 2019-01-26 PROCEDURE — 80048 BASIC METABOLIC PNL TOTAL CA: CPT | Performed by: STUDENT IN AN ORGANIZED HEALTH CARE EDUCATION/TRAINING PROGRAM

## 2019-01-26 PROCEDURE — 25000128 H RX IP 250 OP 636: Mod: ZF

## 2019-01-26 RX ORDER — LORATADINE 10 MG/1
10 TABLET ORAL DAILY
COMMUNITY
End: 2019-08-27

## 2019-01-26 RX ORDER — HEPARIN SODIUM,PORCINE 10 UNIT/ML
5 VIAL (ML) INTRAVENOUS ONCE
Status: COMPLETED | OUTPATIENT
Start: 2019-01-26 | End: 2019-01-26

## 2019-01-26 RX ORDER — HEPARIN SODIUM,PORCINE 10 UNIT/ML
5 VIAL (ML) INTRAVENOUS
Status: CANCELLED | OUTPATIENT
Start: 2019-01-26

## 2019-01-26 RX ADMIN — FILGRASTIM 900 MCG: 300 INJECTION, SOLUTION INTRAVENOUS; SUBCUTANEOUS at 08:27

## 2019-01-26 RX ADMIN — Medication 5 ML: at 08:01

## 2019-01-26 ASSESSMENT — PAIN SCALES - GENERAL: PAINLEVEL: NO PAIN (0)

## 2019-01-26 NOTE — NURSING NOTE
Chief Complaint   Patient presents with     RECHECK     Multiple myeloma (H); labs drawn via cvc by RN     BP (!) 144/94 (BP Location: Right arm, Patient Position: Chair, Cuff Size: Adult Large)   Pulse 107   Temp 98.1  F (36.7  C)   Wt 84.6 kg (186 lb 6.4 oz)   SpO2 97%   BMI 27.61 kg/m      Lines accessed by RN in lab. Labs collected through brown lumen and sent. Both lines flushed with NS & Heparin. Pt tolerated well.   Pt checked in for next appointment.    Maria Teresa Bourgeois RN

## 2019-01-26 NOTE — PROGRESS NOTES
BMT Progress Note      Angel Yanez is a 58 year old male with IgA Kappa MM - Admitted for cytoxan chemo-priming prior stem cell collections for 2nd autologous pbsct.      HPI: Guille is here to start G-CSF.   No fevers, chills, or other infectious symptoms. Eating fine, he denies n/v/d since leaving the hospital.    Review of Systems: 10 point ROS negative except as noted above.     Physical Exam:   BP (!) 157/94   Pulse 107   Temp 98.1  F (36.7  C)   Wt 84.6 kg (186 lb 6.4 oz)   SpO2 97%   BMI 27.61 kg/m      General: NAD   Eyes: : FARRAH, sclera anicteric   Nose/Mouth/Throat: OP clear, buccal mucosa moist, no ulcerations   Lungs: CTA bilaterally  Cardiovascular: RRR, no M/R/G   Abdominal/Rectal: +BS, soft, NT, ND, No HSM   Lymphatics: no edema  Skin: no rashes on exposed skin  Neuro: A&O   Additional Findings: Hsieh site NT with serosanguinous stains. No active bleeding or  Drainage. No discharge or tenderness. Skin is non erythematous. No bruising    Labs:  Lab Results   Component Value Date    WBC 2.0 (L) 01/26/2019    ANEU 1.4 (L) 01/26/2019    HGB 13.0 (L) 01/26/2019    HCT 39.7 (L) 01/26/2019     (L) 01/26/2019     01/26/2019    POTASSIUM 3.7 01/26/2019    CHLORIDE 107 01/26/2019    CO2 28 01/26/2019     (H) 01/26/2019    BUN 17 01/26/2019    CR 0.88 01/26/2019    MAG 2.2 01/24/2019    INR 1.02 01/22/2019    AST 18 01/24/2019    ALT 18 01/24/2019       Assessment and Plan    1.  BMT:  Day +5 s/p chemopriming prior to stem cell collections for 2nd Autologous PBSCT for MM (first tx 2005 - did not collect for 2 transplants at that time). Repeating Auto given long disease free duration (no detectable disease 6974-3762)  1/22/19: Cytoxan   900mcg GCSF starts 72 hr post cytoxan (1/26/19) and continue through stem cell collections. Goal 5x10^6 cd34/kg     2.  HEME: Keep Hgb>8 and plts>10K. No pre-meds.  - Dressing changed after IR evaluated, per their note wnl post procedural inflammation.  Following.     3.  ID: Afebrile.   Of note, line associated infection with previous transplant 2005 per pt.   - Prophy: levaquin 250mg po daily as anticipated neutropenic- continue through collections; started fluconazole 200mg daily 1/23. Continue LD Acy 400mg PO BID - will transition to 800 5x daily post transplant given CMV+               4.  GI: Zofran and dexamethasone prior to chemo to prevent N/V.   - Post cytoxan emesis, currently no n/v  - Discharge with zofran 4mg ODT - to take 4-8mg q8hrs prn + 5-10mg compazine q6hr prn for nausea. Encouraged to take zofran qAM until few days without any nausea.   Of note: no ativan as hx of hives with it  Continue  Protonix for GI prophy (was on this at baseline for GERD).      5.  FEN/Renal:  Cr stable 0.9. Na and K also stable.      6.) HTN: HTN (systolic more pronounced so still possibly white coat and pt was asked to urgently present to clinic, some stress around line issues). Will wait for calmer day  - Hx of hyperlipidemia, not currently on tx.       G-CSF today   RTC Sunday for gcsf       Marleni Darnell MD

## 2019-01-27 ENCOUNTER — APPOINTMENT (OUTPATIENT)
Dept: LAB | Facility: CLINIC | Age: 59
End: 2019-01-27
Payer: COMMERCIAL

## 2019-01-27 ENCOUNTER — ONCOLOGY VISIT (OUTPATIENT)
Dept: TRANSPLANT | Facility: CLINIC | Age: 59
End: 2019-01-27
Payer: COMMERCIAL

## 2019-01-27 VITALS
OXYGEN SATURATION: 97 % | RESPIRATION RATE: 20 BRPM | TEMPERATURE: 98.7 F | WEIGHT: 187.2 LBS | BODY MASS INDEX: 27.72 KG/M2 | SYSTOLIC BLOOD PRESSURE: 139 MMHG | HEART RATE: 96 BPM | DIASTOLIC BLOOD PRESSURE: 95 MMHG

## 2019-01-27 DIAGNOSIS — C90.01 MULTIPLE MYELOMA IN REMISSION (H): Primary | ICD-10-CM

## 2019-01-27 LAB
ANION GAP SERPL CALCULATED.3IONS-SCNC: 7 MMOL/L (ref 3–14)
BASOPHILS # BLD AUTO: 0 10E9/L (ref 0–0.2)
BASOPHILS NFR BLD AUTO: 0.1 %
BUN SERPL-MCNC: 18 MG/DL (ref 7–30)
CALCIUM SERPL-MCNC: 8.7 MG/DL (ref 8.5–10.1)
CHLORIDE SERPL-SCNC: 107 MMOL/L (ref 94–109)
CO2 SERPL-SCNC: 27 MMOL/L (ref 20–32)
CREAT SERPL-MCNC: 0.84 MG/DL (ref 0.66–1.25)
DIFFERENTIAL METHOD BLD: ABNORMAL
EOSINOPHIL # BLD AUTO: 0.1 10E9/L (ref 0–0.7)
EOSINOPHIL NFR BLD AUTO: 1.5 %
ERYTHROCYTE [DISTWIDTH] IN BLOOD BY AUTOMATED COUNT: 12.9 % (ref 10–15)
GFR SERPL CREATININE-BSD FRML MDRD: >90 ML/MIN/{1.73_M2}
GLUCOSE SERPL-MCNC: 94 MG/DL (ref 70–99)
HCT VFR BLD AUTO: 38.6 % (ref 40–53)
HGB BLD-MCNC: 12.7 G/DL (ref 13.3–17.7)
IMM GRANULOCYTES # BLD: 0.1 10E9/L (ref 0–0.4)
IMM GRANULOCYTES NFR BLD: 1.6 %
LYMPHOCYTES # BLD AUTO: 0.4 10E9/L (ref 0.8–5.3)
LYMPHOCYTES NFR BLD AUTO: 5.8 %
MCH RBC QN AUTO: 34.1 PG (ref 26.5–33)
MCHC RBC AUTO-ENTMCNC: 32.9 G/DL (ref 31.5–36.5)
MCV RBC AUTO: 104 FL (ref 78–100)
MONOCYTES # BLD AUTO: 0.3 10E9/L (ref 0–1.3)
MONOCYTES NFR BLD AUTO: 3.8 %
NEUTROPHILS # BLD AUTO: 6 10E9/L (ref 1.6–8.3)
NEUTROPHILS NFR BLD AUTO: 87.2 %
NRBC # BLD AUTO: 0 10*3/UL
NRBC BLD AUTO-RTO: 0 /100
PLATELET # BLD AUTO: 90 10E9/L (ref 150–450)
POTASSIUM SERPL-SCNC: 4 MMOL/L (ref 3.4–5.3)
RBC # BLD AUTO: 3.72 10E12/L (ref 4.4–5.9)
SODIUM SERPL-SCNC: 141 MMOL/L (ref 133–144)
WBC # BLD AUTO: 6.9 10E9/L (ref 4–11)

## 2019-01-27 PROCEDURE — 25000128 H RX IP 250 OP 636: Mod: ZF | Performed by: PHYSICIAN ASSISTANT

## 2019-01-27 PROCEDURE — 96372 THER/PROPH/DIAG INJ SC/IM: CPT

## 2019-01-27 PROCEDURE — 85025 COMPLETE CBC W/AUTO DIFF WBC: CPT

## 2019-01-27 PROCEDURE — 25000128 H RX IP 250 OP 636: Mod: ZF | Performed by: INTERNAL MEDICINE

## 2019-01-27 PROCEDURE — 80048 BASIC METABOLIC PNL TOTAL CA: CPT

## 2019-01-27 RX ORDER — HEPARIN SODIUM,PORCINE 10 UNIT/ML
5 VIAL (ML) INTRAVENOUS
Status: DISCONTINUED | OUTPATIENT
Start: 2019-01-27 | End: 2019-01-27 | Stop reason: HOSPADM

## 2019-01-27 RX ORDER — HEPARIN SODIUM,PORCINE 10 UNIT/ML
5 VIAL (ML) INTRAVENOUS
Status: CANCELLED | OUTPATIENT
Start: 2019-01-27

## 2019-01-27 RX ADMIN — FILGRASTIM 900 MCG: 300 INJECTION, SOLUTION INTRAVENOUS; SUBCUTANEOUS at 08:19

## 2019-01-27 RX ADMIN — SODIUM CHLORIDE, PRESERVATIVE FREE 5 ML: 5 INJECTION INTRAVENOUS at 08:26

## 2019-01-27 RX ADMIN — SODIUM CHLORIDE, PRESERVATIVE FREE 5 ML: 5 INJECTION INTRAVENOUS at 08:27

## 2019-01-27 ASSESSMENT — PAIN SCALES - GENERAL: PAINLEVEL: NO PAIN (0)

## 2019-01-27 NOTE — PROGRESS NOTES
BMT Progress Note      Angel Yanez is a 58 year old male with IgA Kappa MM - Admitted for cytoxan chemo-priming prior stem cell collections for 2nd autologous pbsct.      HPI: Guille has started GCSF D+2 today   No fevers, chills, or other infectious symptoms. Eating fine, he denies n/v/d since leaving the hospital.    Review of Systems: 10 point ROS negative except as noted above.     Physical Exam:   BP (!) 139/95 (BP Location: Left arm, Patient Position: Sitting)   Pulse 96   Temp 98.7  F (37.1  C) (Oral)   Resp 20   Wt 84.9 kg (187 lb 3.2 oz)   SpO2 97%   BMI 27.72 kg/m      General: NAD   Eyes: : FARRAH, sclera anicteric   Nose/Mouth/Throat: OP clear, buccal mucosa moist, no ulcerations   Lungs: CTA bilaterally  Cardiovascular: RRR, no M/R/G   Abdominal/Rectal: +BS, soft, NT, ND, No HSM   Lymphatics: no edema  Skin: no rashes on exposed skin  Neuro: A&O   Additional Findings: Hsieh site NT with serosanguinous stains. No active bleeding or  Drainage. No discharge or tenderness. Skin is non erythematous. No bruising    Labs:  Lab Results   Component Value Date    WBC 6.9 01/27/2019    ANEU 6.0 01/27/2019    HGB 12.7 (L) 01/27/2019    HCT 38.6 (L) 01/27/2019    PLT 90 (L) 01/27/2019     01/27/2019    POTASSIUM 4.0 01/27/2019    CHLORIDE 107 01/27/2019    CO2 27 01/27/2019    GLC 94 01/27/2019    BUN 18 01/27/2019    CR 0.84 01/27/2019    MAG 2.2 01/24/2019    INR 1.02 01/22/2019    AST 18 01/24/2019    ALT 18 01/24/2019       Assessment and Plan    1.  BMT:  Day +6 s/p chemopriming prior to stem cell collections for 2nd Autologous PBSCT for MM (first tx 2005 - did not collect for 2 transplants at that time). Repeating Auto given long disease free duration (no detectable disease 9240-9797)  1/22/19: Cytoxan   900mcg GCSF starts 72 hr post cytoxan (1/26/19) and continue through stem cell collections. Goal 5x10^6 cd34/kg     2.  HEME: Keep Hgb>8 and plts>10K. No pre-meds.  - Dressing changed after IR  evaluated, per their note wnl post procedural inflammation. Following.     3.  ID: Afebrile.   Of note, line associated infection with previous transplant 2005 per pt.   - Prophy: levaquin 250mg po daily as anticipated neutropenic- continue through collections; started fluconazole 200mg daily 1/23. Continue LD Acy 400mg PO BID - will transition to 800 5x daily post transplant given CMV+               4.  GI: Zofran and dexamethasone prior to chemo to prevent N/V.   - Post cytoxan emesis, currently no n/v  - Discharged with zofran 4mg ODT - to take 4-8mg q8hrs prn + 5-10mg compazine q6hr prn for nausea. Encouraged to take zofran qAM until few days without any nausea.   Of note: no ativan as hx of hives with it  Continue  Protonix for GI prophy (was on this at baseline for GERD).      5.  FEN/Renal:  Cr stable 0.9. Na and K also stable.      6.) HTN: HTN (systolic more pronounced so still possibly white coat and pt was asked to urgently present to clinic, some stress around line issues). Will wait for calmer day  - Hx of hyperlipidemia, not currently on tx.       G-CSF today   RTC Monday for gcsf       Marleni Darnell MD

## 2019-01-28 ENCOUNTER — ONCOLOGY VISIT (OUTPATIENT)
Dept: TRANSPLANT | Facility: CLINIC | Age: 59
End: 2019-01-28
Attending: STUDENT IN AN ORGANIZED HEALTH CARE EDUCATION/TRAINING PROGRAM
Payer: COMMERCIAL

## 2019-01-28 ENCOUNTER — APPOINTMENT (OUTPATIENT)
Dept: LAB | Facility: CLINIC | Age: 59
End: 2019-01-28
Attending: STUDENT IN AN ORGANIZED HEALTH CARE EDUCATION/TRAINING PROGRAM
Payer: COMMERCIAL

## 2019-01-28 VITALS
OXYGEN SATURATION: 97 % | HEART RATE: 109 BPM | DIASTOLIC BLOOD PRESSURE: 92 MMHG | WEIGHT: 184 LBS | RESPIRATION RATE: 20 BRPM | BODY MASS INDEX: 27.25 KG/M2 | TEMPERATURE: 98.6 F | SYSTOLIC BLOOD PRESSURE: 147 MMHG

## 2019-01-28 DIAGNOSIS — C90.01 MULTIPLE MYELOMA IN REMISSION (H): Primary | ICD-10-CM

## 2019-01-28 LAB
ALBUMIN SERPL-MCNC: 3.6 G/DL (ref 3.4–5)
ALP SERPL-CCNC: 83 U/L (ref 40–150)
ALT SERPL W P-5'-P-CCNC: 32 U/L (ref 0–70)
ANION GAP SERPL CALCULATED.3IONS-SCNC: 6 MMOL/L (ref 3–14)
AST SERPL W P-5'-P-CCNC: 25 U/L (ref 0–45)
BASOPHILS # BLD AUTO: 0 10E9/L (ref 0–0.2)
BASOPHILS NFR BLD AUTO: 0.3 %
BILIRUB SERPL-MCNC: 0.6 MG/DL (ref 0.2–1.3)
BUN SERPL-MCNC: 20 MG/DL (ref 7–30)
CALCIUM SERPL-MCNC: 8.8 MG/DL (ref 8.5–10.1)
CHLORIDE SERPL-SCNC: 106 MMOL/L (ref 94–109)
CO2 SERPL-SCNC: 27 MMOL/L (ref 20–32)
CREAT SERPL-MCNC: 0.9 MG/DL (ref 0.66–1.25)
DIFFERENTIAL METHOD BLD: ABNORMAL
EOSINOPHIL # BLD AUTO: 0.2 10E9/L (ref 0–0.7)
EOSINOPHIL NFR BLD AUTO: 5.8 %
ERYTHROCYTE [DISTWIDTH] IN BLOOD BY AUTOMATED COUNT: 12.7 % (ref 10–15)
GFR SERPL CREATININE-BSD FRML MDRD: >90 ML/MIN/{1.73_M2}
GLUCOSE SERPL-MCNC: 86 MG/DL (ref 70–99)
HCT VFR BLD AUTO: 38.6 % (ref 40–53)
HGB BLD-MCNC: 13.1 G/DL (ref 13.3–17.7)
IMM GRANULOCYTES # BLD: 0.1 10E9/L (ref 0–0.4)
IMM GRANULOCYTES NFR BLD: 2 %
LYMPHOCYTES # BLD AUTO: 0.3 10E9/L (ref 0.8–5.3)
LYMPHOCYTES NFR BLD AUTO: 11.5 %
MCH RBC QN AUTO: 34.7 PG (ref 26.5–33)
MCHC RBC AUTO-ENTMCNC: 33.9 G/DL (ref 31.5–36.5)
MCV RBC AUTO: 102 FL (ref 78–100)
MONOCYTES # BLD AUTO: 0.2 10E9/L (ref 0–1.3)
MONOCYTES NFR BLD AUTO: 6.4 %
NEUTROPHILS # BLD AUTO: 2.2 10E9/L (ref 1.6–8.3)
NEUTROPHILS NFR BLD AUTO: 74 %
NRBC # BLD AUTO: 0 10*3/UL
NRBC BLD AUTO-RTO: 0 /100
PLATELET # BLD AUTO: 67 10E9/L (ref 150–450)
POTASSIUM SERPL-SCNC: 4.3 MMOL/L (ref 3.4–5.3)
PROT SERPL-MCNC: 7.4 G/DL (ref 6.8–8.8)
RBC # BLD AUTO: 3.78 10E12/L (ref 4.4–5.9)
SODIUM SERPL-SCNC: 140 MMOL/L (ref 133–144)
WBC # BLD AUTO: 3 10E9/L (ref 4–11)

## 2019-01-28 PROCEDURE — 80053 COMPREHEN METABOLIC PANEL: CPT | Performed by: STUDENT IN AN ORGANIZED HEALTH CARE EDUCATION/TRAINING PROGRAM

## 2019-01-28 PROCEDURE — 96372 THER/PROPH/DIAG INJ SC/IM: CPT

## 2019-01-28 PROCEDURE — G0463 HOSPITAL OUTPT CLINIC VISIT: HCPCS | Mod: ZF

## 2019-01-28 PROCEDURE — 25000128 H RX IP 250 OP 636: Mod: ZF | Performed by: PHYSICIAN ASSISTANT

## 2019-01-28 PROCEDURE — 85025 COMPLETE CBC W/AUTO DIFF WBC: CPT | Performed by: STUDENT IN AN ORGANIZED HEALTH CARE EDUCATION/TRAINING PROGRAM

## 2019-01-28 RX ORDER — HEPARIN SODIUM,PORCINE 10 UNIT/ML
5 VIAL (ML) INTRAVENOUS
Status: CANCELLED | OUTPATIENT
Start: 2019-01-28

## 2019-01-28 RX ADMIN — FILGRASTIM 900 MCG: 300 INJECTION, SOLUTION INTRAVENOUS; SUBCUTANEOUS at 09:12

## 2019-01-28 NOTE — NURSING NOTE
Chief Complaint   Patient presents with     Blood Draw     No orders, labs not drawn, VS taken     Message in clinic apt for orders, pt c/o itching underneath [id number] dressing, advised to have changed in clinic.    Leoncio Malave RN

## 2019-01-28 NOTE — PROGRESS NOTES
BMT Progress Note      Angel Yanez is a 58 year old male with IgA Kappa MM - Admitted for cytoxan chemo-priming prior stem cell collections for 2nd autologous pbsct.      HPI: Guille is here post chemo priming gcsf. He has a lot of irritation and itching around his line, also on his upper left chest away from his line. Transient itching all over, that isn't as bothersome as the line itching. Patch of thickening psoriasis like area on his lower back. He denies any plaques or skin breaks. No rashes, bruises or bleeding. No cough or congestion. No further oozing from line site. No bleeding there or any there bleeding. States he is eating well, no n/v.     Review of Systems: 10 point ROS negative except as noted above.     Physical Exam:   BP (!) 147/92 (BP Location: Right arm, Patient Position: Sitting, Cuff Size: Adult Large)   Pulse 109   Temp 98.6  F (37  C) (Oral)   Resp 20   Wt 83.5 kg (184 lb)   SpO2 97%   BMI 27.25 kg/m      General: NAD   Eyes: : FARRAH, sclera anicteric   Nose/Mouth/Throat: OP clear, buccal mucosa moist, no ulcerations   Lungs: CTA bilaterally  Cardiovascular: RRR, no M/R/G   Abdominal/Rectal: +BS, soft, NT, ND, No HSM   Lymphatics: no edema  Skin: no rashes on exposed skin  Neuro: A&O   Additional Findings: Hsieh site NT, not warm to touch. Site is c/d/i. Skin under tape has pink patchy appearance. No oozing, bleeding or bruising    Labs:  Lab Results   Component Value Date    WBC 6.9 01/27/2019    ANEU 6.0 01/27/2019    HGB 12.7 (L) 01/27/2019    HCT 38.6 (L) 01/27/2019    PLT 90 (L) 01/27/2019     01/27/2019    POTASSIUM 4.0 01/27/2019    CHLORIDE 107 01/27/2019    CO2 27 01/27/2019    GLC 94 01/27/2019    BUN 18 01/27/2019    CR 0.84 01/27/2019    MAG 2.2 01/24/2019    INR 1.02 01/22/2019    AST 18 01/24/2019    ALT 18 01/24/2019       Assessment and Plan    1.  BMT:  Day +7 s/p chemopriming prior to stem cell collections for 2nd Autologous PBSCT for MM (first tx 2005 - did  not collect for 2 transplants at that time). Repeating Auto given long disease free duration (no detectable disease 0689-7211)  1/22/19: Cytoxan   900mcg GCSF starts 72 hr post cytoxan (1/26/19) and continue through stem cell collections. Goal 5x10^6 cd34/kg     2.  HEME: Keep Hgb>8 and plts>10K. No pre-meds.  - Dressing changed after IR evaluated, per their note wnl post procedural inflammation. Following.     3.  ID: Afebrile.   Of note, line associated infection with previous transplant 2005 per pt.   - Prophy: levaquin 250mg po daily as anticipated neutropenic- continue through collections; started fluconazole 200mg daily 1/23. Continue LD Acy 400mg PO BID - will transition to 800 5x daily post transplant given CMV+               4.  GI: no n/v/d  - Discharge with zofran, compazine prn  Of note: no ativan as hx of hives with it  Continue  Protonix for GI prophy (was on this at baseline for GERD).      5.  FEN/Renal:  Cr stable 0.9. Na and K also stable.      6.) HTN: HTN (systolic more pronounced so still possibly white coat and pt was asked to urgently present to clinic, some stress around line issues). States it is usually 120s/80s. Holding off on starting new med as not getting worse.  - Hx of hyperlipidemia, not currently on tx.     7.) Derm: itching, benadryl at night prn. Changing dressing  Psoriasis, h/o pred use; ok to use topical triamcinolone       RTC Daily for gcsf, labs, provider      Mony Pitts PAC  [phone]

## 2019-01-28 NOTE — NURSING NOTE
"Oncology Rooming Note    January 28, 2019 8:25 AM   Angel Yanez is a 58 year old male who presents for:    Chief Complaint   Patient presents with     Blood Draw     No orders, labs not drawn, VS taken     RECHECK     RTN- multiple myeloma     Initial Vitals: BP (!) 147/92 (BP Location: Right arm, Patient Position: Sitting, Cuff Size: Adult Large)   Pulse 109   Temp 98.6  F (37  C) (Oral)   Resp 20   Wt 83.5 kg (184 lb)   SpO2 97%   BMI 27.25 kg/m   Estimated body mass index is 27.25 kg/m  as calculated from the following:    Height as of 1/24/19: 1.75 m (5' 8.9\").    Weight as of this encounter: 83.5 kg (184 lb). Body surface area is 2.01 meters squared.  Data Unavailable Comment: Data Unavailable   No LMP for male patient.  Allergies reviewed: Yes  Medications reviewed: Yes    Medications: Medication refills not needed today.  Pharmacy name entered into EPIC:    [facility] - [place] - [address] PHARMACY [facility] - [place], [place] - [address] [facility]    Clinical concerns:  Rash on and itching under dressing.     7 minutes for nursing intake (face to face time)     Vickie Harris CMA              "

## 2019-01-29 ENCOUNTER — APPOINTMENT (OUTPATIENT)
Dept: LAB | Facility: CLINIC | Age: 59
End: 2019-01-29
Attending: STUDENT IN AN ORGANIZED HEALTH CARE EDUCATION/TRAINING PROGRAM
Payer: COMMERCIAL

## 2019-01-29 ENCOUNTER — ONCOLOGY VISIT (OUTPATIENT)
Dept: TRANSPLANT | Facility: CLINIC | Age: 59
End: 2019-01-29
Attending: STUDENT IN AN ORGANIZED HEALTH CARE EDUCATION/TRAINING PROGRAM
Payer: COMMERCIAL

## 2019-01-29 ENCOUNTER — DOCUMENTATION ONLY (OUTPATIENT)
Dept: OTHER | Facility: CLINIC | Age: 59
End: 2019-01-29

## 2019-01-29 VITALS
BODY MASS INDEX: 27.46 KG/M2 | WEIGHT: 185.4 LBS | HEART RATE: 103 BPM | SYSTOLIC BLOOD PRESSURE: 148 MMHG | DIASTOLIC BLOOD PRESSURE: 90 MMHG | TEMPERATURE: 98.5 F | OXYGEN SATURATION: 96 %

## 2019-01-29 DIAGNOSIS — Z86.2 PERSONAL HISTORY OF DISEASES OF BLOOD AND BLOOD-FORMING ORGANS: ICD-10-CM

## 2019-01-29 DIAGNOSIS — C90.01 MULTIPLE MYELOMA IN REMISSION (H): Primary | ICD-10-CM

## 2019-01-29 LAB
ANION GAP SERPL CALCULATED.3IONS-SCNC: 4 MMOL/L (ref 3–14)
BASOPHILS # BLD AUTO: 0 10E9/L (ref 0–0.2)
BASOPHILS NFR BLD AUTO: 0.9 %
BUN SERPL-MCNC: 18 MG/DL (ref 7–30)
BURR CELLS BLD QL SMEAR: SLIGHT
CALCIUM SERPL-MCNC: 8.8 MG/DL (ref 8.5–10.1)
CHLORIDE SERPL-SCNC: 106 MMOL/L (ref 94–109)
CO2 SERPL-SCNC: 28 MMOL/L (ref 20–32)
CREAT SERPL-MCNC: 0.89 MG/DL (ref 0.66–1.25)
DIFFERENTIAL METHOD BLD: ABNORMAL
EOSINOPHIL # BLD AUTO: 0.1 10E9/L (ref 0–0.7)
EOSINOPHIL NFR BLD AUTO: 12.4 %
ERYTHROCYTE [DISTWIDTH] IN BLOOD BY AUTOMATED COUNT: 12.4 % (ref 10–15)
GFR SERPL CREATININE-BSD FRML MDRD: >90 ML/MIN/{1.73_M2}
GLUCOSE SERPL-MCNC: 93 MG/DL (ref 70–99)
HCT VFR BLD AUTO: 36.4 % (ref 40–53)
HGB BLD-MCNC: 12.4 G/DL (ref 13.3–17.7)
LYMPHOCYTES # BLD AUTO: 0.3 10E9/L (ref 0.8–5.3)
LYMPHOCYTES NFR BLD AUTO: 38.9 %
MCH RBC QN AUTO: 34.5 PG (ref 26.5–33)
MCHC RBC AUTO-ENTMCNC: 34.1 G/DL (ref 31.5–36.5)
MCV RBC AUTO: 101 FL (ref 78–100)
MONOCYTES # BLD AUTO: 0 10E9/L (ref 0–1.3)
MONOCYTES NFR BLD AUTO: 2.7 %
NEUTROPHILS # BLD AUTO: 0.4 10E9/L (ref 1.6–8.3)
NEUTROPHILS NFR BLD AUTO: 45.1 %
PLATELET # BLD AUTO: 46 10E9/L (ref 150–450)
PLATELET # BLD EST: ABNORMAL 10*3/UL
POTASSIUM SERPL-SCNC: 4.2 MMOL/L (ref 3.4–5.3)
RBC # BLD AUTO: 3.59 10E12/L (ref 4.4–5.9)
SODIUM SERPL-SCNC: 138 MMOL/L (ref 133–144)
WBC # BLD AUTO: 0.8 10E9/L (ref 4–11)

## 2019-01-29 PROCEDURE — 80048 BASIC METABOLIC PNL TOTAL CA: CPT | Performed by: STUDENT IN AN ORGANIZED HEALTH CARE EDUCATION/TRAINING PROGRAM

## 2019-01-29 PROCEDURE — 82784 ASSAY IGA/IGD/IGG/IGM EACH: CPT | Performed by: INTERNAL MEDICINE

## 2019-01-29 PROCEDURE — 25000128 H RX IP 250 OP 636: Mod: ZF | Performed by: STUDENT IN AN ORGANIZED HEALTH CARE EDUCATION/TRAINING PROGRAM

## 2019-01-29 PROCEDURE — 85025 COMPLETE CBC W/AUTO DIFF WBC: CPT | Performed by: STUDENT IN AN ORGANIZED HEALTH CARE EDUCATION/TRAINING PROGRAM

## 2019-01-29 PROCEDURE — G0463 HOSPITAL OUTPT CLINIC VISIT: HCPCS | Mod: ZF

## 2019-01-29 PROCEDURE — 96372 THER/PROPH/DIAG INJ SC/IM: CPT

## 2019-01-29 PROCEDURE — 86334 IMMUNOFIX E-PHORESIS SERUM: CPT | Performed by: INTERNAL MEDICINE

## 2019-01-29 PROCEDURE — 25000128 H RX IP 250 OP 636: Mod: ZF | Performed by: PHYSICIAN ASSISTANT

## 2019-01-29 RX ORDER — HEPARIN SODIUM,PORCINE 10 UNIT/ML
5 VIAL (ML) INTRAVENOUS
Status: CANCELLED | OUTPATIENT
Start: 2019-01-29

## 2019-01-29 RX ORDER — HEPARIN SODIUM,PORCINE 10 UNIT/ML
5 VIAL (ML) INTRAVENOUS
Status: DISCONTINUED | OUTPATIENT
Start: 2019-01-29 | End: 2019-01-29 | Stop reason: HOSPADM

## 2019-01-29 RX ADMIN — Medication 5 ML: at 08:06

## 2019-01-29 RX ADMIN — FILGRASTIM 900 MCG: 300 INJECTION, SOLUTION INTRAVENOUS; SUBCUTANEOUS at 08:47

## 2019-01-29 RX ADMIN — Medication 5 ML: at 08:07

## 2019-01-29 ASSESSMENT — PAIN SCALES - GENERAL: PAINLEVEL: NO PAIN (0)

## 2019-01-29 NOTE — NURSING NOTE
Received critical lab values call from lab:  WBC 0.8  Platelets 46  Estimate on absolute neutrophils 0.3    [employer] [facility] notified at 0828.

## 2019-01-29 NOTE — PROGRESS NOTES
BMT Teaching Flowsheet    Angel Yanez is a 58 year old male  Diagnoses of Multiple myeloma (H) and Personal history of diseases of blood and blood-forming organs were pertinent to this visit.    Teaching Topic: auto bmt for his myeloma    Person(s) involved in teaching: Patient  Motivation Level  Asks Questions: Yes  Eager to Learn: Yes  Cooperative: Yes  Receptive (willing/able to accept information): Yes  Any cultural factors/Sikh beliefs that may influence understanding or compliance? No    Patient demonstrates understanding of the following:  - Reason for the appointment, diagnosis and treatment plan: Yes  - Knowledge of proper use of medications and conditions for which they are ordered (with special attention to potential side effects or drug interactions): Yes  - Which situations necessitate calling provider and whom to contact: Yes    Teaching concerns addressed: none at this time    Proper use and care of (medical equipment, care aids, etc.) Yes  Pain management techniques: Yes  Patient instructed on hand hygiene: Yes  How and/when to access community resources: Yes    Infection Control:  Patient demonstrates understanding of the following:  Surgical procedure site care taught Yes  Signs and symptoms of infection taught Yes  Wound care taught Yes  Central venous catheter care taught Yes    Instructional Materials Used/Given:  teaching book including calendars, consents, med info as well as contact information. Also reviewed the discharge teaching guidelines, and routines of both the inpt as well as outpt clinic. Teachback method  Used.       Time spent with patient: 60 minutes.    Specific Concerns: NA

## 2019-01-29 NOTE — PROGRESS NOTES
BMT Progress Note      Angel Yanez is a 58 year old male with IgA Kappa MM - Admitted for cytoxan chemo-priming prior stem cell collections for 2nd autologous pbsct.      HPI: Guille is here post chemo priming gcsf. He still has some skin irritation/itching around his Hsieh dressing. No other rashes. No bleeding. No n/v/d/c. No SOB/CP. Otherwise feeling well.    Review of Systems: 10 point ROS negative except as noted above.     Physical Exam:   /90   Pulse 103   Temp 98.5  F (36.9  C) (Oral)   Wt 84.1 kg (185 lb 6.4 oz)   SpO2 96%   BMI 27.46 kg/m      General: NAD   Eyes: : FARRAH, sclera anicteric   Nose/Mouth/Throat: OP clear, buccal mucosa moist, no ulcerations   Lungs: CTA bilaterally  Cardiovascular: RRR, no M/R/G   Abdominal/Rectal: +BS, soft, NT, ND, No HSM   Lymphatics: no edema  Skin: reddened skin surrounding Hsieh site  Neuro: A&O   Additional Findings: Hsieh site NT, not warm to touch. Site is c/d/i. Skin under tape has pink patchy appearance. Some oozing noted on dressing    Labs:  Lab Results   Component Value Date    WBC 0.8 (LL) 01/29/2019    ANEU 0.4 (LL) 01/29/2019    HGB 12.4 (L) 01/29/2019    HCT 36.4 (L) 01/29/2019    PLT 46 (LL) 01/29/2019     01/29/2019    POTASSIUM 4.2 01/29/2019    CHLORIDE 106 01/29/2019    CO2 28 01/29/2019    GLC 93 01/29/2019    BUN 18 01/29/2019    CR 0.89 01/29/2019    MAG 2.2 01/24/2019    INR 1.02 01/22/2019    AST 25 01/28/2019    ALT 32 01/28/2019       Assessment and Plan    1.  BMT:  Day +8 s/p chemopriming prior to stem cell collections for 2nd Autologous PBSCT for MM (first tx 2005 - did not collect for 2 transplants at that time). Repeating Auto given long disease free duration (no detectable disease 2570-9519)  1/22/19: Cytoxan   900mcg GCSF started 72 hr post cytoxan (1/26/19) and continue through stem cell collections. Goal 5x10^6 cd34/kg     2.  HEME: Keep Hgb>8 and plts>10K. No pre-meds.  - Dressing changed after IR evaluated, per  their note wnl post procedural inflammation. Following.  - Counts downtrending     3.  ID: Afebrile.   Of note, line associated infection with previous transplant 2005 per pt.   - Prophy: levaquin 250mg po daily as anticipated neutropenic- continue through collections; started fluconazole 200mg daily 1/23. Continue LD Acy 400mg PO BID - will transition to 800 5x daily post transplant given CMV+               4.  GI: no n/v/d  - Discharged with zofran, compazine prn  Of note: no ativan as hx of hives with it  Continue  Protonix for GI prophy (was on this at baseline for GERD).      5.  FEN/Renal:  Stable today     6.) HTN: HTN (systolic more pronounced so still possibly white coat and pt was asked to urgently present to clinic, some stress around line issues). States it is usually 120s/80s. Holding off on starting new med as not getting worse.  - Hx of hyperlipidemia, not currently on tx.     7.) Derm: itching, benadryl at night prn. Changing dressing  Psoriasis, h/o pred use; ok to use topical triamcinolone       RTC Daily for gcsf, labs, provider      Mariana Jolly NP  [phone]

## 2019-01-29 NOTE — NURSING NOTE
Chief Complaint   Patient presents with     Lab Only     labs drawn via cvc by RN in lab, saline and heparin locked, vitals completed

## 2019-01-29 NOTE — NURSING NOTE
"Oncology Rooming Note    January 29, 2019 8:22 AM   Angel Yanez is a 58 year old male who presents for:    Chief Complaint   Patient presents with     Lab Only     labs drawn via cvc by RN in lab, saline and heparin locked, vitals completed     Initial Vitals: /90   Pulse 103   Temp 98.5  F (36.9  C) (Oral)   Wt 84.1 kg (185 lb 6.4 oz)   SpO2 96%   BMI 27.46 kg/m   Estimated body mass index is 27.46 kg/m  as calculated from the following:    Height as of 1/24/19: 1.75 m (5' 8.9\").    Weight as of this encounter: 84.1 kg (185 lb 6.4 oz). Body surface area is 2.02 meters squared.  No Pain (0) Comment: Data Unavailable   No LMP for male patient.  Allergies reviewed: Yes  Medications reviewed: Yes    Medications: Medication refills not needed today.  Pharmacy name entered into EPIC:    [facility] - [place], [place] - [address] PHARMACY [address] - [place], [place] - [address] [facility]    Clinical concerns: High blood pressure    7 minutes for nursing intake (face to face time)     Vickie Harris, JORGE LUIS              "

## 2019-01-30 ENCOUNTER — APPOINTMENT (OUTPATIENT)
Dept: LAB | Facility: CLINIC | Age: 59
End: 2019-01-30
Attending: STUDENT IN AN ORGANIZED HEALTH CARE EDUCATION/TRAINING PROGRAM
Payer: COMMERCIAL

## 2019-01-30 ENCOUNTER — ONCOLOGY VISIT (OUTPATIENT)
Dept: TRANSPLANT | Facility: CLINIC | Age: 59
End: 2019-01-30
Attending: STUDENT IN AN ORGANIZED HEALTH CARE EDUCATION/TRAINING PROGRAM
Payer: COMMERCIAL

## 2019-01-30 VITALS
OXYGEN SATURATION: 98 % | HEART RATE: 104 BPM | RESPIRATION RATE: 16 BRPM | TEMPERATURE: 98.3 F | DIASTOLIC BLOOD PRESSURE: 91 MMHG | BODY MASS INDEX: 27.53 KG/M2 | SYSTOLIC BLOOD PRESSURE: 151 MMHG | WEIGHT: 185.9 LBS

## 2019-01-30 DIAGNOSIS — C90.01 MULTIPLE MYELOMA IN REMISSION (H): Primary | ICD-10-CM

## 2019-01-30 LAB
ANION GAP SERPL CALCULATED.3IONS-SCNC: 7 MMOL/L (ref 3–14)
BUN SERPL-MCNC: 15 MG/DL (ref 7–30)
CALCIUM SERPL-MCNC: 8.1 MG/DL (ref 8.5–10.1)
CHLORIDE SERPL-SCNC: 109 MMOL/L (ref 94–109)
CO2 SERPL-SCNC: 26 MMOL/L (ref 20–32)
CREAT SERPL-MCNC: 0.88 MG/DL (ref 0.66–1.25)
DIFFERENTIAL METHOD BLD: ABNORMAL
ERYTHROCYTE [DISTWIDTH] IN BLOOD BY AUTOMATED COUNT: 12.3 % (ref 10–15)
GFR SERPL CREATININE-BSD FRML MDRD: >90 ML/MIN/{1.73_M2}
GLUCOSE SERPL-MCNC: 108 MG/DL (ref 70–99)
HCT VFR BLD AUTO: 36 % (ref 40–53)
HGB BLD-MCNC: 12.1 G/DL (ref 13.3–17.7)
MCH RBC QN AUTO: 34.7 PG (ref 26.5–33)
MCHC RBC AUTO-ENTMCNC: 33.6 G/DL (ref 31.5–36.5)
MCV RBC AUTO: 103 FL (ref 78–100)
PLATELET # BLD AUTO: 30 10E9/L (ref 150–450)
POTASSIUM SERPL-SCNC: 4 MMOL/L (ref 3.4–5.3)
RBC # BLD AUTO: 3.49 10E12/L (ref 4.4–5.9)
SODIUM SERPL-SCNC: 141 MMOL/L (ref 133–144)
WBC # BLD AUTO: 0.3 10E9/L (ref 4–11)

## 2019-01-30 PROCEDURE — 85025 COMPLETE CBC W/AUTO DIFF WBC: CPT | Performed by: NURSE PRACTITIONER

## 2019-01-30 PROCEDURE — 25000128 H RX IP 250 OP 636: Mod: ZF | Performed by: PHYSICIAN ASSISTANT

## 2019-01-30 PROCEDURE — 96372 THER/PROPH/DIAG INJ SC/IM: CPT

## 2019-01-30 PROCEDURE — 85027 COMPLETE CBC AUTOMATED: CPT

## 2019-01-30 PROCEDURE — 80048 BASIC METABOLIC PNL TOTAL CA: CPT | Performed by: NURSE PRACTITIONER

## 2019-01-30 PROCEDURE — 25000128 H RX IP 250 OP 636: Mod: ZF | Performed by: STUDENT IN AN ORGANIZED HEALTH CARE EDUCATION/TRAINING PROGRAM

## 2019-01-30 RX ORDER — HEPARIN SODIUM,PORCINE 10 UNIT/ML
5 VIAL (ML) INTRAVENOUS
Status: CANCELLED | OUTPATIENT
Start: 2019-01-30

## 2019-01-30 RX ORDER — HEPARIN SODIUM,PORCINE 10 UNIT/ML
5 VIAL (ML) INTRAVENOUS ONCE
Status: COMPLETED | OUTPATIENT
Start: 2019-01-30 | End: 2019-01-30

## 2019-01-30 RX ORDER — LISINOPRIL 10 MG/1
10 TABLET ORAL DAILY
Qty: 30 TABLET | Refills: 0 | Status: SHIPPED | OUTPATIENT
Start: 2019-01-30 | End: 2019-05-06

## 2019-01-30 RX ADMIN — Medication 5 ML: at 07:27

## 2019-01-30 RX ADMIN — FILGRASTIM 900 MCG: 300 INJECTION, SOLUTION INTRAVENOUS; SUBCUTANEOUS at 08:07

## 2019-01-30 ASSESSMENT — PAIN SCALES - GENERAL: PAINLEVEL: NO PAIN (0)

## 2019-01-30 NOTE — NURSING NOTE
"Oncology Rooming Note    January 30, 2019 8:06 AM   Angel Yanez is a 58 year old male who presents for:    Chief Complaint   Patient presents with     Blood Draw     labs drawn via cvc by rn. Caps changed. High BP, clinic notified.      RECHECK     Pt is here for labs and to get GCSF for Pre BMT MM     Initial Vitals: BP (!) 151/91 (BP Location: Left arm, Patient Position: Sitting, Cuff Size: Adult Large)   Pulse 104   Temp 98.3  F (36.8  C) (Oral)   Resp 16   Wt 84.3 kg (185 lb 14.4 oz)   SpO2 98%   BMI 27.53 kg/m   Estimated body mass index is 27.53 kg/m  as calculated from the following:    Height as of 1/24/19: 1.75 m (5' 8.9\").    Weight as of this encounter: 84.3 kg (185 lb 14.4 oz). Body surface area is 2.02 meters squared.  No Pain (0) Comment: Data Unavailable   No LMP for male patient.  Allergies reviewed: Yes  Medications reviewed: Yes    Medications: Medication refills not needed today.  Pharmacy name entered into EPIC:    [facility] [place] [facility] [address]  [facility] [address] - [place], [place] - [address]    Clinical concerns: none     0 minutes for nursing intake (face to face time)     Shoshana Salazar MA              "

## 2019-01-30 NOTE — NURSING NOTE
Chief Complaint   Patient presents with     Blood Draw     labs drawn via cvc by rn. Caps changed. High BP, clinic notified.      CVC accessed and labs drawn by RN in lab. Flushed with heparin. Caps changed. VS taken. Pt checked in for next appointment. High BP, clinic notified.   Parris Lua RN

## 2019-01-30 NOTE — PROGRESS NOTES
BMT Progress Note      Angel Yanez is a 58 year old male with IgA Kappa MM - Admitted for cytoxan chemo-priming prior stem cell collections for 2nd autologous pbsct.      HPI: Guille is here post chemo priming gcsf. He continues to be worried about his elevated BP. No HAs or blurry vision. Eating/drinking well. No n/v/d/c. Skin beneath line dressing still irritated. No other complaints today.    Review of Systems: 10 point ROS negative except as noted above.     Physical Exam:   BP (!) 151/91 (BP Location: Left arm, Patient Position: Sitting, Cuff Size: Adult Large)   Pulse 104   Temp 98.3  F (36.8  C) (Oral)   Resp 16   Wt 84.3 kg (185 lb 14.4 oz)   SpO2 98%   BMI 27.53 kg/m      General: NAD   Eyes: : FARRAH, sclera anicteric   Nose/Mouth/Throat: OP clear, buccal mucosa moist, no ulcerations   Lungs: CTA bilaterally  Cardiovascular: RRR, no M/R/G   Abdominal/Rectal: +BS, soft, NT, ND, No HSM   Lymphatics: no edema  Skin: reddened skin surrounding Hsieh site  Neuro: A&O   Additional Findings: Hsieh site NT, not warm to touch. Site is c/d/i. Skin under tape has pink patchy appearance. Some oozing noted on dressing    Labs:  Lab Results   Component Value Date    WBC 0.3 (LL) 01/30/2019    ANEU 0.4 (LL) 01/29/2019    HGB 12.1 (L) 01/30/2019    HCT 36.0 (L) 01/30/2019    PLT 30 (LL) 01/30/2019     01/30/2019    POTASSIUM 4.0 01/30/2019    CHLORIDE 109 01/30/2019    CO2 26 01/30/2019     (H) 01/30/2019    BUN 15 01/30/2019    CR 0.88 01/30/2019    MAG 2.2 01/24/2019    INR 1.02 01/22/2019    AST 25 01/28/2019    ALT 32 01/28/2019       Assessment and Plan    1.  BMT:  Day +9 s/p chemopriming prior to stem cell collections for 2nd Autologous PBSCT for MM (first tx 2005 - did not collect for 2 transplants at that time). Repeating Auto given long disease free duration (no detectable disease 8213-3891)  1/22/19: Cytoxan   900mcg GCSF started 72 hr post cytoxan (1/26/19) and continue through stem cell  collections. Goal 5x10^6 cd34/kg     2.  HEME: Keep Hgb>8 and plts>10K. No pre-meds.  - Dressing changed after IR evaluated, per their note wnl post procedural inflammation. Following.  - Counts downtrending     3.  ID: Afebrile.   Of note, line associated infection with previous transplant 2005 per pt.   - Prophy: levaquin 250mg po daily as anticipated neutropenic- continue through collections; started fluconazole 200mg daily 1/23. Continue LD Acy 400mg PO BID - will transition to 800 5x daily post transplant given CMV+               4.  GI: no n/v/d  - Discharged with zofran, compazine prn  Of note: no ativan as hx of hives with it  Continue  Protonix for GI prophy (was on this at baseline for GERD).      5.  FEN/Renal:  Elytes stable today     6.) HTN: HTN (systolic more pronounced so still possibly white coat and pt was asked to urgently present to clinic, some stress around line issues). States it is usually 120s/80s. Start lisinopril 10mg daily today.   - Hx of hyperlipidemia, not currently on tx.     7.) Derm: itching, benadryl at night prn. Changing dressing  Psoriasis, h/o pred use; ok to use topical triamcinolone       RTC Daily for gcsf, labs, provider      Mariana Jolly NP  [phone]

## 2019-01-30 NOTE — NURSING NOTE
Dressing change completed this visit.  Pt with resolving bruising surrounding catheter site.  Old dressing with dried drainage on gauze.  Site cleaned with betadine swabs, biopatch applied to site.  IV 3000 dressing applied.  No active drainage or bleeding observed.  RN encouraged pt to have RN check dressing daily for s/s of drainage at site.

## 2019-01-31 ENCOUNTER — APPOINTMENT (OUTPATIENT)
Dept: LAB | Facility: CLINIC | Age: 59
End: 2019-01-31
Attending: NURSE PRACTITIONER
Payer: COMMERCIAL

## 2019-01-31 ENCOUNTER — ONCOLOGY VISIT (OUTPATIENT)
Dept: TRANSPLANT | Facility: CLINIC | Age: 59
End: 2019-01-31
Attending: STUDENT IN AN ORGANIZED HEALTH CARE EDUCATION/TRAINING PROGRAM
Payer: COMMERCIAL

## 2019-01-31 VITALS
HEIGHT: 69 IN | BODY MASS INDEX: 27.59 KG/M2 | TEMPERATURE: 98.8 F | DIASTOLIC BLOOD PRESSURE: 90 MMHG | RESPIRATION RATE: 16 BRPM | HEART RATE: 108 BPM | WEIGHT: 186.29 LBS | SYSTOLIC BLOOD PRESSURE: 137 MMHG | OXYGEN SATURATION: 97 %

## 2019-01-31 DIAGNOSIS — C90.01 MULTIPLE MYELOMA IN REMISSION (H): Primary | ICD-10-CM

## 2019-01-31 LAB
ANION GAP SERPL CALCULATED.3IONS-SCNC: 6 MMOL/L (ref 3–14)
BUN SERPL-MCNC: 14 MG/DL (ref 7–30)
CALCIUM SERPL-MCNC: 8.2 MG/DL (ref 8.5–10.1)
CHLORIDE SERPL-SCNC: 106 MMOL/L (ref 94–109)
CO2 SERPL-SCNC: 27 MMOL/L (ref 20–32)
CREAT SERPL-MCNC: 0.85 MG/DL (ref 0.66–1.25)
DIFFERENTIAL METHOD BLD: ABNORMAL
ERYTHROCYTE [DISTWIDTH] IN BLOOD BY AUTOMATED COUNT: 12.1 % (ref 10–15)
GFR SERPL CREATININE-BSD FRML MDRD: >90 ML/MIN/{1.73_M2}
GLUCOSE SERPL-MCNC: 100 MG/DL (ref 70–99)
HCT VFR BLD AUTO: 35.2 % (ref 40–53)
HGB BLD-MCNC: 11.9 G/DL (ref 13.3–17.7)
IGA SERPL-MCNC: 311 MG/DL (ref 70–380)
IGG SERPL-MCNC: 826 MG/DL (ref 695–1620)
IGM SERPL-MCNC: 43 MG/DL (ref 60–265)
MCH RBC QN AUTO: 34.5 PG (ref 26.5–33)
MCHC RBC AUTO-ENTMCNC: 33.8 G/DL (ref 31.5–36.5)
MCV RBC AUTO: 102 FL (ref 78–100)
PLATELET # BLD AUTO: 25 10E9/L (ref 150–450)
PLATELET # BLD EST: ABNORMAL 10*3/UL
POTASSIUM SERPL-SCNC: 3.9 MMOL/L (ref 3.4–5.3)
PROT PATTERN SERPL IFE-IMP: ABNORMAL
RBC # BLD AUTO: 3.45 10E12/L (ref 4.4–5.9)
SODIUM SERPL-SCNC: 139 MMOL/L (ref 133–144)
WBC # BLD AUTO: 0.3 10E9/L (ref 4–11)

## 2019-01-31 PROCEDURE — 80048 BASIC METABOLIC PNL TOTAL CA: CPT | Performed by: NURSE PRACTITIONER

## 2019-01-31 PROCEDURE — 25000128 H RX IP 250 OP 636: Mod: ZF | Performed by: PHYSICIAN ASSISTANT

## 2019-01-31 PROCEDURE — 85025 COMPLETE CBC W/AUTO DIFF WBC: CPT | Performed by: NURSE PRACTITIONER

## 2019-01-31 PROCEDURE — 96372 THER/PROPH/DIAG INJ SC/IM: CPT

## 2019-01-31 PROCEDURE — G0463 HOSPITAL OUTPT CLINIC VISIT: HCPCS | Mod: ZF

## 2019-01-31 PROCEDURE — 85027 COMPLETE CBC AUTOMATED: CPT

## 2019-01-31 RX ORDER — HEPARIN SODIUM,PORCINE 10 UNIT/ML
5 VIAL (ML) INTRAVENOUS
Status: DISCONTINUED | OUTPATIENT
Start: 2019-01-31 | End: 2019-01-31 | Stop reason: HOSPADM

## 2019-01-31 RX ORDER — HEPARIN SODIUM,PORCINE 10 UNIT/ML
5 VIAL (ML) INTRAVENOUS
Status: CANCELLED | OUTPATIENT
Start: 2019-01-31

## 2019-01-31 RX ADMIN — Medication 5 ML: at 07:56

## 2019-01-31 RX ADMIN — FILGRASTIM 900 MCG: 300 INJECTION, SOLUTION INTRAVENOUS; SUBCUTANEOUS at 08:33

## 2019-01-31 RX ADMIN — Medication 5 ML: at 07:55

## 2019-01-31 ASSESSMENT — PAIN SCALES - GENERAL: PAINLEVEL: NO PAIN (0)

## 2019-01-31 ASSESSMENT — MIFFLIN-ST. JEOR: SCORE: 1643.79

## 2019-01-31 NOTE — NURSING NOTE
Chief Complaint   Patient presents with     Blood Draw     Labs drawn by RN in Lab from Right Chest Davol Catheter. Line flushed with Heparin.      Radha Coker RN

## 2019-01-31 NOTE — NURSING NOTE
"Oncology Rooming Note    January 31, 2019 8:18 AM   Angel Yanez is a 58 year old male who presents for:    Chief Complaint   Patient presents with     Blood Draw     Labs drawn by RN in Lab from Right Chest Davol Catheter. Line flushed with Heparin.      RECHECK     Return: Multiple myeloma      Initial Vitals: /90   Pulse 108   Temp 98.8  F (37.1  C) (Oral)   Resp 16   Ht 1.75 m (5' 8.9\")   Wt 84.5 kg (186 lb 4.6 oz)   SpO2 97%   BMI 27.59 kg/m   Estimated body mass index is 27.59 kg/m  as calculated from the following:    Height as of this encounter: 1.75 m (5' 8.9\").    Weight as of this encounter: 84.5 kg (186 lb 4.6 oz). Body surface area is 2.03 meters squared.  No Pain (0) Comment: Data Unavailable   No LMP for male patient.  Allergies reviewed: Yes  Medications reviewed: Yes    Medications: Medication refills not needed today.  Pharmacy name entered into EPIC:    [facility] [place] [facility] [address]  [facility] - [place], [place] - [address] [facility]    Clinical concerns: Pt complains of sensitive teeth while brushing Alejandra W was notified.    5 minutes for nursing intake (face to face time)     Yesica Duncan CMA              "

## 2019-01-31 NOTE — PROGRESS NOTES
"BMT Progress Note      Angel Yanez is a 58 year old male with IgA Kappa MM - Admitted for cytoxan chemo-priming prior stem cell collections for 2nd autologous pbsct.      HPI: Guille is doing well. No fevers or infectious symptoms. No n/v/d/c. Some gingival sensitivity but no oral sores or bleeding. No GCSF pain. Started lisinopril yesterday.    Review of Systems: 10 point ROS negative except as noted above.     Physical Exam:   /90   Pulse 108   Temp 98.8  F (37.1  C) (Oral)   Resp 16   Ht 1.75 m (5' 8.9\")   Wt 84.5 kg (186 lb 4.6 oz)   SpO2 97%   BMI 27.59 kg/m    General: A&O, pleasant, NAD   Eyes: : FARRAH, sclera anicteric   Nose/Mouth/Throat: OP clear, buccal mucosa moist, no ulcerations. Single petechiae right buccal mucosa  Lungs: CTA bilaterally  Cardiovascular: RRR, no M/R/G   Abdominal/Rectal: +BS, soft, NT, ND, No HSM   Lymphatics: no edema  Skin: reddened skin surrounding Hsieh site  Neuro: A&O   Additional Findings: Hsieh site with some redness under dressing due to skin irritation, dressing changed 1/30 using betadine - skin with yellow appearance.    Labs:  Lab Results   Component Value Date    WBC 0.3 (LL) 01/31/2019    ANEU 0.4 (LL) 01/29/2019    HGB 11.9 (L) 01/31/2019    HCT 35.2 (L) 01/31/2019    PLT 25 (LL) 01/31/2019     01/31/2019    POTASSIUM 3.9 01/31/2019    CHLORIDE 106 01/31/2019    CO2 27 01/31/2019     (H) 01/31/2019    BUN 14 01/31/2019    CR 0.85 01/31/2019    MAG 2.2 01/24/2019    INR 1.02 01/22/2019    AST 25 01/28/2019    ALT 32 01/28/2019       Assessment and Plan    1.  BMT:  Day +10 s/p chemopriming prior to stem cell collections for 2nd Autologous PBSCT for MM (first tx 2005 - did not collect for 2 transplants at that time). Repeating Auto given long disease free duration (no detectable disease 7695-5168)  1/22/19: Cytoxan   900mcg GCSF started 72 hr post cytoxan (1/26/19) and continue through stem cell collections. Goal 5x10^6 cd34/kg     2. "  HEME: Keep Hgb>8 and plts>10K. No pre-meds.  - Dressing changed after IR evaluated, per their note wnl post procedural inflammation. Following.  - Counts downtrending. Plts 25k     3.  ID: Afebrile.   Of note, line associated infection with previous transplant 2005 per pt.   - Prophy: levaquin 250mg po daily as anticipated neutropenic- continue through collections; started fluconazole 200mg daily 1/23. Continue LD Acy 400mg PO BID - will transition to 800 5x daily post transplant given CMV+               4.  GI: no n/v/d  - Discharged with zofran, compazine prn  Of note: no ativan as hx of hives with it  Continue  Protonix for GI prophy (was on this at baseline for GERD).      5.  FEN/Renal:  Elytes stable today     6.) HTN: HTN (systolic more pronounced so still possibly white coat and pt was asked to urgently present to clinic, some stress around line issues). States it is usually 120s/80s. Start lisinopril 10mg daily 1/30  - Hx of hyperlipidemia, not currently on tx.     7.) Derm: itching, benadryl at night prn. Changing dressing  Psoriasis, h/o pred use; ok to use topical triamcinolone       RTC Daily for gcsf, labs, provider - infusion for poss plts tomorrow     Alejandra Adorno PA-C  [phone]

## 2019-02-01 ENCOUNTER — APPOINTMENT (OUTPATIENT)
Dept: LAB | Facility: CLINIC | Age: 59
End: 2019-02-01
Attending: PHYSICIAN ASSISTANT
Payer: COMMERCIAL

## 2019-02-01 ENCOUNTER — INFUSION THERAPY VISIT (OUTPATIENT)
Dept: TRANSPLANT | Facility: CLINIC | Age: 59
End: 2019-02-01
Attending: PHYSICIAN ASSISTANT
Payer: COMMERCIAL

## 2019-02-01 VITALS
HEART RATE: 105 BPM | DIASTOLIC BLOOD PRESSURE: 84 MMHG | BODY MASS INDEX: 27.55 KG/M2 | OXYGEN SATURATION: 98 % | WEIGHT: 186 LBS | RESPIRATION RATE: 16 BRPM | TEMPERATURE: 98.5 F | SYSTOLIC BLOOD PRESSURE: 131 MMHG

## 2019-02-01 DIAGNOSIS — C90.01 MULTIPLE MYELOMA IN REMISSION (H): Primary | ICD-10-CM

## 2019-02-01 LAB
ANION GAP SERPL CALCULATED.3IONS-SCNC: 8 MMOL/L (ref 3–14)
BASOPHILS # BLD AUTO: 0 10E9/L (ref 0–0.2)
BASOPHILS NFR BLD AUTO: 0 %
BLD PROD TYP BPU: NORMAL
BLD UNIT ID BPU: 0
BLOOD PRODUCT CODE: NORMAL
BPU ID: NORMAL
BUN SERPL-MCNC: 14 MG/DL (ref 7–30)
CALCIUM SERPL-MCNC: 8.5 MG/DL (ref 8.5–10.1)
CHLORIDE SERPL-SCNC: 107 MMOL/L (ref 94–109)
CO2 SERPL-SCNC: 25 MMOL/L (ref 20–32)
CREAT SERPL-MCNC: 0.93 MG/DL (ref 0.66–1.25)
DIFFERENTIAL METHOD BLD: ABNORMAL
EOSINOPHIL # BLD AUTO: 0 10E9/L (ref 0–0.7)
EOSINOPHIL NFR BLD AUTO: 3.5 %
ERYTHROCYTE [DISTWIDTH] IN BLOOD BY AUTOMATED COUNT: 12.1 % (ref 10–15)
GFR SERPL CREATININE-BSD FRML MDRD: 88 ML/MIN/{1.73_M2}
GLUCOSE SERPL-MCNC: 112 MG/DL (ref 70–99)
HCT VFR BLD AUTO: 33.7 % (ref 40–53)
HGB BLD-MCNC: 11.5 G/DL (ref 13.3–17.7)
LYMPHOCYTES # BLD AUTO: 0.4 10E9/L (ref 0.8–5.3)
LYMPHOCYTES NFR BLD AUTO: 67.3 %
MCH RBC QN AUTO: 34.4 PG (ref 26.5–33)
MCHC RBC AUTO-ENTMCNC: 34.1 G/DL (ref 31.5–36.5)
MCV RBC AUTO: 101 FL (ref 78–100)
MONOCYTES # BLD AUTO: 0.1 10E9/L (ref 0–1.3)
MONOCYTES NFR BLD AUTO: 13.3 %
NEUTROPHILS # BLD AUTO: 0.1 10E9/L (ref 1.6–8.3)
NEUTROPHILS NFR BLD AUTO: 15.9 %
OVALOCYTES BLD QL SMEAR: SLIGHT
PLATELET # BLD AUTO: 25 10E9/L (ref 150–450)
PLATELET # BLD EST: ABNORMAL 10*3/UL
POIKILOCYTOSIS BLD QL SMEAR: SLIGHT
POTASSIUM SERPL-SCNC: 3.8 MMOL/L (ref 3.4–5.3)
RBC # BLD AUTO: 3.34 10E12/L (ref 4.4–5.9)
SODIUM SERPL-SCNC: 140 MMOL/L (ref 133–144)
TRANSFUSION STATUS PATIENT QL: NORMAL
TRANSFUSION STATUS PATIENT QL: NORMAL
WBC # BLD AUTO: 0.6 10E9/L (ref 4–11)

## 2019-02-01 PROCEDURE — 40000268 ZZH STATISTIC NO CHARGES: Mod: ZF

## 2019-02-01 PROCEDURE — 25000128 H RX IP 250 OP 636: Mod: ZF | Performed by: PHYSICIAN ASSISTANT

## 2019-02-01 PROCEDURE — 96372 THER/PROPH/DIAG INJ SC/IM: CPT

## 2019-02-01 PROCEDURE — 80048 BASIC METABOLIC PNL TOTAL CA: CPT | Performed by: PHYSICIAN ASSISTANT

## 2019-02-01 PROCEDURE — G0463 HOSPITAL OUTPT CLINIC VISIT: HCPCS | Mod: ZF

## 2019-02-01 PROCEDURE — 85025 COMPLETE CBC W/AUTO DIFF WBC: CPT | Performed by: PHYSICIAN ASSISTANT

## 2019-02-01 RX ORDER — HEPARIN SODIUM (PORCINE) LOCK FLUSH IV SOLN 100 UNIT/ML 100 UNIT/ML
3 SOLUTION INTRAVENOUS
Status: CANCELLED | OUTPATIENT
Start: 2019-02-01

## 2019-02-01 RX ORDER — HEPARIN SODIUM,PORCINE 10 UNIT/ML
5 VIAL (ML) INTRAVENOUS
Status: CANCELLED | OUTPATIENT
Start: 2019-02-01

## 2019-02-01 RX ORDER — HEPARIN SODIUM,PORCINE 10 UNIT/ML
5 VIAL (ML) INTRAVENOUS
Status: DISCONTINUED | OUTPATIENT
Start: 2019-02-01 | End: 2019-02-01 | Stop reason: HOSPADM

## 2019-02-01 RX ADMIN — FILGRASTIM 900 MCG: 300 INJECTION, SOLUTION INTRAVENOUS; SUBCUTANEOUS at 12:59

## 2019-02-01 RX ADMIN — Medication 5 ML: at 11:11

## 2019-02-01 RX ADMIN — Medication 5 ML: at 11:10

## 2019-02-01 ASSESSMENT — PAIN SCALES - GENERAL: PAINLEVEL: NO PAIN (0)

## 2019-02-01 NOTE — PROGRESS NOTES
BMT Progress Note      Angel Yanez is a 58 year old male with IgA Kappa MM - Admitted for cytoxan chemo-priming prior stem cell collections for 2nd autologous pbsct.      HPI: Guille is doing well. No fevers or infectious symptoms. Denies n/v/d/c. No rash. No bleeding. Gingiva tender when he brushed his teeth. No oral sores or bleeding.    Review of Systems: 10 point ROS negative except as noted above.     Physical Exam:   /84   Pulse 105   Temp 98.5  F (36.9  C)   Resp 16   Wt 84.4 kg (186 lb)   SpO2 98%   BMI 27.55 kg/m    General: A&O, pleasant, NAD   Eyes: : FARRAH, sclera anicteric   Nose/Mouth/Throat: OP clear, buccal mucosa moist, no ulcerations. Single petechiae right buccal mucosa  Lungs: CTA bilaterally  Cardiovascular: RRR, no M/R/G   Abdominal/Rectal: +BS, soft, NT, ND, No HSM   Lymphatics: no edema  Skin: reddened skin surrounding Hsieh site  Neuro: A&O   Additional Findings: Hsieh site with some redness under dressing due to skin irritation, dressing changed 1/30 using betadine - skin with yellow appearance.    Labs:  Lab Results   Component Value Date    WBC 0.6 (LL) 02/01/2019    ANEU 0.1 (LL) 02/01/2019    HGB 11.5 (L) 02/01/2019    HCT 33.7 (L) 02/01/2019    PLT 25 (LL) 02/01/2019     02/01/2019    POTASSIUM 3.8 02/01/2019    CHLORIDE 107 02/01/2019    CO2 25 02/01/2019     (H) 02/01/2019    BUN 14 02/01/2019    CR 0.93 02/01/2019    MAG 2.2 01/24/2019    INR 1.02 01/22/2019    AST 25 01/28/2019    ALT 32 01/28/2019       Assessment and Plan    1.  BMT:  Day +11 s/p chemopriming prior to stem cell collections for 2nd Autologous PBSCT for MM (first tx 2005 - did not collect for 2 transplants at that time). Repeating Auto given long disease free duration (no detectable disease 8774-6845)  1/22/19: Cytoxan   900mcg GCSF started 72 hr post cytoxan (1/26/19) and continue through stem cell collections. Goal 5x10^6 cd34/kg     2.  HEME: Keep Hgb>8 and plts>10K. No pre-meds.  -  Dressing changed after IR evaluated, per their note wnl post procedural inflammation. Following.  - Counts downtrending. Plts 25k     3.  ID: Afebrile.   Of note, line associated infection with previous transplant 2005 per pt.   - Prophy: levaquin 250mg po daily as anticipated neutropenic- continue through collections; started fluconazole 200mg daily 1/23. Continue LD Acy 400mg PO BID - will transition to 800 5x daily post transplant given CMV+               4.  GI: no n/v/d  - Discharged with zofran, compazine prn  Of note: no ativan as hx of hives with it  Continue  Protonix for GI prophy (was on this at baseline for GERD).      5.  FEN/Renal:  Elytes stable today     6.) HTN: HTN (systolic more pronounced so still possibly white coat and pt was asked to urgently present to clinic, some stress around line issues). States it is usually 120s/80s. Start lisinopril 10mg daily 1/30  - Hx of hyperlipidemia, not currently on tx.     7.) Derm: itching, benadryl at night prn. Changing dressing  Psoriasis, h/o pred use; ok to use topical triamcinolone       RTC Daily for gcsf, labs, provider - doubt he would be ready to collect over the weekend, but possible collections on Monday. Requested early appt time. Notified apheresis of possible collections Monday     Alejandra Adorno PA-C  [phone]

## 2019-02-01 NOTE — NURSING NOTE
Chief Complaint   Patient presents with     Labs Only     cvc, heparin locked, vitals checked     drsg changed in lab

## 2019-02-01 NOTE — NURSING NOTE
"Oncology Rooming Note    February 1, 2019 12:15 PM   Angel Yanez is a 58 year old male who presents for:    Chief Complaint   Patient presents with     Labs Only     cvc, heparin locked, vitals checked     RECHECK     RTN- multiple myeloma     Initial Vitals: /84   Pulse 105   Temp 98.5  F (36.9  C)   Resp 16   Wt 84.4 kg (186 lb)   SpO2 98%   BMI 27.55 kg/m   Estimated body mass index is 27.55 kg/m  as calculated from the following:    Height as of 1/31/19: 1.75 m (5' 8.9\").    Weight as of this encounter: 84.4 kg (186 lb). Body surface area is 2.03 meters squared.  No Pain (0) Comment: Data Unavailable   No LMP for male patient.  Allergies reviewed: Yes  Medications reviewed: Yes    Medications: Medication refills not needed today.  Pharmacy name entered into EPIC:    [facility] - [place] - [address]  [facility] - [place], [place] - [address]    Clinical concerns: none     7 minutes for nursing intake (face to face time)     Vickie Harris CMA              "

## 2019-02-02 ENCOUNTER — ONCOLOGY VISIT (OUTPATIENT)
Dept: TRANSPLANT | Facility: CLINIC | Age: 59
End: 2019-02-02
Payer: COMMERCIAL

## 2019-02-02 ENCOUNTER — APPOINTMENT (OUTPATIENT)
Dept: LAB | Facility: CLINIC | Age: 59
End: 2019-02-02
Payer: COMMERCIAL

## 2019-02-02 ENCOUNTER — INFUSION THERAPY VISIT (OUTPATIENT)
Dept: TRANSPLANT | Facility: CLINIC | Age: 59
End: 2019-02-02
Payer: COMMERCIAL

## 2019-02-02 VITALS
OXYGEN SATURATION: 97 % | WEIGHT: 184.4 LBS | SYSTOLIC BLOOD PRESSURE: 134 MMHG | RESPIRATION RATE: 16 BRPM | BODY MASS INDEX: 27.31 KG/M2 | DIASTOLIC BLOOD PRESSURE: 84 MMHG | TEMPERATURE: 98.4 F | HEART RATE: 107 BPM

## 2019-02-02 DIAGNOSIS — C90.01 MULTIPLE MYELOMA IN REMISSION (H): Primary | ICD-10-CM

## 2019-02-02 LAB
ANION GAP SERPL CALCULATED.3IONS-SCNC: 7 MMOL/L (ref 3–14)
BASOPHILS # BLD AUTO: 0 10E9/L (ref 0–0.2)
BASOPHILS NFR BLD AUTO: 0 %
BLD PROD TYP BPU: NORMAL
BLD PROD TYP BPU: NORMAL
BLD UNIT ID BPU: 0
BLOOD PRODUCT CODE: NORMAL
BPU ID: NORMAL
BUN SERPL-MCNC: 15 MG/DL (ref 7–30)
CALCIUM SERPL-MCNC: 8.5 MG/DL (ref 8.5–10.1)
CHLORIDE SERPL-SCNC: 111 MMOL/L (ref 94–109)
CO2 SERPL-SCNC: 25 MMOL/L (ref 20–32)
CREAT SERPL-MCNC: 0.91 MG/DL (ref 0.66–1.25)
DIFFERENTIAL METHOD BLD: ABNORMAL
EOSINOPHIL # BLD AUTO: 0 10E9/L (ref 0–0.7)
EOSINOPHIL NFR BLD AUTO: 1.8 %
ERYTHROCYTE [DISTWIDTH] IN BLOOD BY AUTOMATED COUNT: 12.1 % (ref 10–15)
GFR SERPL CREATININE-BSD FRML MDRD: >90 ML/MIN/{1.73_M2}
GLUCOSE SERPL-MCNC: 112 MG/DL (ref 70–99)
HCT VFR BLD AUTO: 32.3 % (ref 40–53)
HGB BLD-MCNC: 11.3 G/DL (ref 13.3–17.7)
LYMPHOCYTES # BLD AUTO: 0.5 10E9/L (ref 0.8–5.3)
LYMPHOCYTES NFR BLD AUTO: 43.3 %
MCH RBC QN AUTO: 35.2 PG (ref 26.5–33)
MCHC RBC AUTO-ENTMCNC: 35 G/DL (ref 31.5–36.5)
MCV RBC AUTO: 101 FL (ref 78–100)
METAMYELOCYTES # BLD: 0 10E9/L
METAMYELOCYTES NFR BLD MANUAL: 3.6 %
MONOCYTES # BLD AUTO: 0.2 10E9/L (ref 0–1.3)
MONOCYTES NFR BLD AUTO: 14.4 %
NEUTROPHILS # BLD AUTO: 0.4 10E9/L (ref 1.6–8.3)
NEUTROPHILS NFR BLD AUTO: 36.9 %
NUM BPU REQUESTED: 1
PLATELET # BLD AUTO: 26 10E9/L (ref 150–450)
PLATELET # BLD EST: ABNORMAL 10*3/UL
POTASSIUM SERPL-SCNC: 3.9 MMOL/L (ref 3.4–5.3)
RBC # BLD AUTO: 3.21 10E12/L (ref 4.4–5.9)
RBC MORPH BLD: NORMAL
SODIUM SERPL-SCNC: 142 MMOL/L (ref 133–144)
TRANSFUSION STATUS PATIENT QL: NORMAL
TRANSFUSION STATUS PATIENT QL: NORMAL
WBC # BLD AUTO: 1.1 10E9/L (ref 4–11)

## 2019-02-02 PROCEDURE — 25000128 H RX IP 250 OP 636: Mod: ZF | Performed by: PHYSICIAN ASSISTANT

## 2019-02-02 PROCEDURE — 96372 THER/PROPH/DIAG INJ SC/IM: CPT

## 2019-02-02 PROCEDURE — 85025 COMPLETE CBC W/AUTO DIFF WBC: CPT

## 2019-02-02 PROCEDURE — 40000268 ZZH STATISTIC NO CHARGES: Mod: ZF

## 2019-02-02 PROCEDURE — 80048 BASIC METABOLIC PNL TOTAL CA: CPT

## 2019-02-02 RX ORDER — HEPARIN SODIUM,PORCINE 10 UNIT/ML
5 VIAL (ML) INTRAVENOUS
Status: CANCELLED | OUTPATIENT
Start: 2019-02-02

## 2019-02-02 RX ORDER — HEPARIN SODIUM,PORCINE 10 UNIT/ML
5 VIAL (ML) INTRAVENOUS
Status: DISCONTINUED | OUTPATIENT
Start: 2019-02-02 | End: 2019-02-02 | Stop reason: HOSPADM

## 2019-02-02 RX ADMIN — Medication 5 ML: at 08:50

## 2019-02-02 RX ADMIN — FILGRASTIM 900 MCG: 300 INJECTION, SOLUTION INTRAVENOUS; SUBCUTANEOUS at 08:52

## 2019-02-02 RX ADMIN — Medication 5 ML: at 08:49

## 2019-02-02 ASSESSMENT — PAIN SCALES - GENERAL: PAINLEVEL: NO PAIN (0)

## 2019-02-02 NOTE — NURSING NOTE
"Oncology Rooming Note    February 2, 2019 8:43 AM   Angel Yanez is a 58 year old male who presents for:    Chief Complaint   Patient presents with     RECHECK     pre bmt for MM here for labs and md visit with gcsf inj     Initial Vitals: /84   Pulse 107   Temp 98.4  F (36.9  C)   Resp 16   Wt 83.6 kg (184 lb 6.4 oz)   SpO2 97%   BMI 27.31 kg/m   Estimated body mass index is 27.31 kg/m  as calculated from the following:    Height as of 1/31/19: 1.75 m (5' 8.9\").    Weight as of this encounter: 83.6 kg (184 lb 6.4 oz). Body surface area is 2.02 meters squared.  No Pain (0) Comment: Data Unavailable   No LMP for male patient.  Allergies reviewed: Yes  Medications reviewed: Yes    Medications: Medication refills not needed today.  Pharmacy name entered into EPIC:    [facility] - [place] - [address] [facility] - [place], [place] - [address] [facility]    Clinical concerns: none     0 minutes for nursing intake (face to face time) pt received gcsf inj today in clinic, labs drawn by RN from cvc, heparin locked, vitals checked    Eloisa King RN              "

## 2019-02-02 NOTE — PROGRESS NOTES
BMT Progress Note      Angel Yanez is a 58 year old male with IgA Kappa MM - s/p cytoxan chemo-priming prior stem cell collections for 2nd autologous pbsct.      HPI: Guille is doing well. No fevers or infectious symptoms. Denies n/v/d/c. No rash. No bleeding. Gingiva tender when he brushed his teeth. No oral sores or bleeding.    Review of Systems: 10 point ROS negative except as noted above.     Physical Exam:   /84   Pulse 107   Temp 98.4  F (36.9  C)   Resp 16   Wt 83.6 kg (184 lb 6.4 oz)   SpO2 97%   BMI 27.31 kg/m    General: A&O, pleasant, NAD   Eyes: : FARRAH, sclera anicteric   Nose/Mouth/Throat: OP clear, buccal mucosa moist, no ulcerations. Single petechiae right buccal mucosa  Lungs: CTA bilaterally  Cardiovascular: RRR, no M/R/G   Abdominal/Rectal: +BS, soft, NT, ND, No HSM   Lymphatics: no edema  Skin: reddened skin surrounding Hsieh site  Neuro: A&O   Additional Findings: Hsieh site with some redness under dressing due to skin irritation, dressing changed 1/30 using betadine - skin with yellow appearance.    Labs:  Lab Results   Component Value Date    WBC 1.1 (L) 02/02/2019    ANEU 0.1 (LL) 02/01/2019    HGB 11.3 (L) 02/02/2019    HCT 32.3 (L) 02/02/2019    PLT 26 (LL) 02/02/2019     02/02/2019    POTASSIUM 3.9 02/02/2019    CHLORIDE 111 (H) 02/02/2019    CO2 25 02/02/2019     (H) 02/02/2019    BUN 15 02/02/2019    CR 0.91 02/02/2019    MAG 2.2 01/24/2019    INR 1.02 01/22/2019    AST 25 01/28/2019    ALT 32 01/28/2019       Assessment and Plan    1.  BMT:  Day +12 s/p chemopriming prior to stem cell collections for 2nd Autologous PBSCT for MM (first tx 2005 - did not collect for 2 transplants at that time). Repeating Auto given long disease free duration (no detectable disease 1031-3536)  1/22/19: Cytoxan   900mcg GCSF started 72 hr post cytoxan (1/26/19) and continue through stem cell collections. Goal 5x10^6 cd34/kg     2.  HEME: Keep Hgb>8 and plts>10K. No  pre-meds.  - Dressing changed after IR evaluated, per their note wnl post procedural inflammation. Following.  - Counts downtrending. Plts 25k     3.  ID: Afebrile.   Of note, line associated infection with previous transplant 2005 per pt.   - Prophy: levaquin 250mg po daily as anticipated neutropenic- continue through collections; started fluconazole 200mg daily 1/23. Continue LD Acy 400mg PO BID - will transition to 800 5x daily post transplant given CMV+               4.  GI: no n/v/d  - Discharged with zofran, compazine prn  Of note: no ativan as hx of hives with it  Continue  Protonix for GI prophy (was on this at baseline for GERD).      5.  FEN/Renal:  Elytes stable today     6.) HTN: HTN (systolic more pronounced so still possibly white coat and pt was asked to urgently present to clinic, some stress around line issues). States it is usually 120s/80s. Start lisinopril 10mg daily 1/30  - Hx of hyperlipidemia, not currently on tx.     7.) Derm: itching, benadryl at night prn. Changing dressing  Psoriasis, h/o pred use; ok to use topical triamcinolone       RTC Daily for gcsf, labs, provider - doubt he would be ready to collect over the weekend, but possible collections on Monday. Requested early appt time. Notified apheresis of possible collections Monday     Marleni Darnell MD  Associate Professor of Medicine  [phone]

## 2019-02-02 NOTE — PROGRESS NOTES
Chief Complaint   Patient presents with     RECHECK     no transfusions needed toda, labs within parameters

## 2019-02-03 ENCOUNTER — APPOINTMENT (OUTPATIENT)
Dept: LAB | Facility: CLINIC | Age: 59
End: 2019-02-03
Payer: COMMERCIAL

## 2019-02-03 ENCOUNTER — ONCOLOGY VISIT (OUTPATIENT)
Dept: TRANSPLANT | Facility: CLINIC | Age: 59
End: 2019-02-03
Payer: COMMERCIAL

## 2019-02-03 ENCOUNTER — INFUSION THERAPY VISIT (OUTPATIENT)
Dept: TRANSPLANT | Facility: CLINIC | Age: 59
End: 2019-02-03
Payer: COMMERCIAL

## 2019-02-03 VITALS
DIASTOLIC BLOOD PRESSURE: 80 MMHG | WEIGHT: 184.7 LBS | RESPIRATION RATE: 16 BRPM | OXYGEN SATURATION: 98 % | TEMPERATURE: 98.6 F | HEART RATE: 106 BPM | SYSTOLIC BLOOD PRESSURE: 123 MMHG | BODY MASS INDEX: 27.35 KG/M2

## 2019-02-03 DIAGNOSIS — C90.01 MULTIPLE MYELOMA IN REMISSION (H): Primary | ICD-10-CM

## 2019-02-03 LAB
ANION GAP SERPL CALCULATED.3IONS-SCNC: 7 MMOL/L (ref 3–14)
BASOPHILS # BLD AUTO: 0 10E9/L (ref 0–0.2)
BASOPHILS NFR BLD AUTO: 0 %
BLD PROD TYP BPU: NORMAL
BLD UNIT ID BPU: 0
BLOOD PRODUCT CODE: NORMAL
BPU ID: NORMAL
BUN SERPL-MCNC: 14 MG/DL (ref 7–30)
CALCIUM SERPL-MCNC: 8.7 MG/DL (ref 8.5–10.1)
CHLORIDE SERPL-SCNC: 109 MMOL/L (ref 94–109)
CO2 SERPL-SCNC: 27 MMOL/L (ref 20–32)
CREAT SERPL-MCNC: 0.94 MG/DL (ref 0.66–1.25)
DIFFERENTIAL METHOD BLD: ABNORMAL
EOSINOPHIL # BLD AUTO: 0 10E9/L (ref 0–0.7)
EOSINOPHIL NFR BLD AUTO: 0.9 %
ERYTHROCYTE [DISTWIDTH] IN BLOOD BY AUTOMATED COUNT: 12.2 % (ref 10–15)
GFR SERPL CREATININE-BSD FRML MDRD: 88 ML/MIN/{1.73_M2}
GLUCOSE SERPL-MCNC: 124 MG/DL (ref 70–99)
HCT VFR BLD AUTO: 33.1 % (ref 40–53)
HGB BLD-MCNC: 11.1 G/DL (ref 13.3–17.7)
LYMPHOCYTES # BLD AUTO: 0.5 10E9/L (ref 0.8–5.3)
LYMPHOCYTES NFR BLD AUTO: 16.5 %
MCH RBC QN AUTO: 34.2 PG (ref 26.5–33)
MCHC RBC AUTO-ENTMCNC: 33.5 G/DL (ref 31.5–36.5)
MCV RBC AUTO: 102 FL (ref 78–100)
MONOCYTES # BLD AUTO: 0.6 10E9/L (ref 0–1.3)
MONOCYTES NFR BLD AUTO: 19.1 %
MYELOCYTES # BLD: 0 10E9/L
MYELOCYTES NFR BLD MANUAL: 0.9 %
NEUTROPHILS # BLD AUTO: 2 10E9/L (ref 1.6–8.3)
NEUTROPHILS NFR BLD AUTO: 62.6 %
PLATELET # BLD AUTO: 27 10E9/L (ref 150–450)
PLATELET # BLD EST: ABNORMAL 10*3/UL
POIKILOCYTOSIS BLD QL SMEAR: SLIGHT
POTASSIUM SERPL-SCNC: 3.8 MMOL/L (ref 3.4–5.3)
RBC # BLD AUTO: 3.25 10E12/L (ref 4.4–5.9)
SODIUM SERPL-SCNC: 142 MMOL/L (ref 133–144)
TRANSFUSION STATUS PATIENT QL: NORMAL
TRANSFUSION STATUS PATIENT QL: NORMAL
WBC # BLD AUTO: 3.2 10E9/L (ref 4–11)

## 2019-02-03 PROCEDURE — 25000128 H RX IP 250 OP 636: Mod: ZF

## 2019-02-03 PROCEDURE — 25000128 H RX IP 250 OP 636: Mod: ZF | Performed by: PHYSICIAN ASSISTANT

## 2019-02-03 PROCEDURE — 96372 THER/PROPH/DIAG INJ SC/IM: CPT

## 2019-02-03 PROCEDURE — 85025 COMPLETE CBC W/AUTO DIFF WBC: CPT

## 2019-02-03 PROCEDURE — 80048 BASIC METABOLIC PNL TOTAL CA: CPT

## 2019-02-03 RX ORDER — HEPARIN SODIUM,PORCINE 10 UNIT/ML
5 VIAL (ML) INTRAVENOUS
Status: DISCONTINUED | OUTPATIENT
Start: 2019-02-03 | End: 2019-02-03 | Stop reason: HOSPADM

## 2019-02-03 RX ORDER — HEPARIN SODIUM,PORCINE 10 UNIT/ML
5 VIAL (ML) INTRAVENOUS
Status: CANCELLED | OUTPATIENT
Start: 2019-02-03

## 2019-02-03 RX ADMIN — Medication 5 ML: at 08:21

## 2019-02-03 RX ADMIN — FILGRASTIM 900 MCG: 300 INJECTION, SOLUTION INTRAVENOUS; SUBCUTANEOUS at 08:41

## 2019-02-03 RX ADMIN — Medication 5 ML: at 08:23

## 2019-02-03 ASSESSMENT — PAIN SCALES - GENERAL: PAINLEVEL: NO PAIN (0)

## 2019-02-03 NOTE — NURSING NOTE
Chief Complaint   Patient presents with     Blood Draw     labs drawn from cvc by rn.  vs taken     Labs drawn from CVC by rn.  Good blood return noted in both lumens.  Both lumens flushed with NS and heparin.  Pt tolerated well.  VS taken.  Pt checked in for next appt.  Dari Florentino RN

## 2019-02-03 NOTE — PROGRESS NOTES
Chief Complaint   Patient presents with     RECHECK     no transfusions needed, labs within parameters

## 2019-02-03 NOTE — PROGRESS NOTES
BMT Progress Note      Angel Yanez is a 58 year old male with IgA Kappa MM - s/p cytoxan chemo-priming prior stem cell collections for 2nd autologous pbsct.      HPI: Guille is doing well. No fevers or infectious symptoms. Denies n/v/d/c. No rash. No bleeding. Gingiva tender when he brushed his teeth. No oral sores or bleeding.day +13 - counts are up    Review of Systems: 10 point ROS negative except as noted above.     Physical Exam:   /80 (BP Location: Right arm, Patient Position: Sitting, Cuff Size: Adult Regular)   Pulse 106   Temp 98.6  F (37  C) (Oral)   Resp 16   Wt 83.8 kg (184 lb 11.2 oz)   SpO2 98%   BMI 27.35 kg/m    General: A&O, pleasant, NAD   Eyes: : FARRAH, sclera anicteric   Nose/Mouth/Throat: OP clear, buccal mucosa moist, no ulcerations. Single petechiae right buccal mucosa  Lungs: CTA bilaterally  Cardiovascular: RRR, no M/R/G   Abdominal/Rectal: +BS, soft, NT, ND, No HSM   Lymphatics: no edema  Skin: reddened skin surrounding Hsieh site  Neuro: A&O   Additional Findings: Hsieh site with some redness under dressing due to skin irritation, dressing changed 1/30 using betadine - skin with yellow appearance.    Labs:  Lab Results   Component Value Date    WBC 3.2 (L) 02/03/2019    ANEU 2.0 02/03/2019    HGB 11.1 (L) 02/03/2019    HCT 33.1 (L) 02/03/2019    PLT 27 (LL) 02/03/2019     02/03/2019    POTASSIUM 3.8 02/03/2019    CHLORIDE 109 02/03/2019    CO2 27 02/03/2019     (H) 02/03/2019    BUN 14 02/03/2019    CR 0.94 02/03/2019    MAG 2.2 01/24/2019    INR 1.02 01/22/2019    AST 25 01/28/2019    ALT 32 01/28/2019       Assessment and Plan    1.  BMT:  Day +13 s/p chemopriming prior to stem cell collections for 2nd Autologous PBSCT for MM (first tx 2005 - did not collect for 2 transplants at that time). Repeating Auto given long disease free duration (no detectable disease 5800-4030)  1/22/19: Cytoxan   900mcg GCSF started 72 hr post cytoxan (1/26/19) and continue  through stem cell collections. Goal 5x10^6 cd34/kg  WBC is rising - check CD34 on Monday      2.  HEME: Keep Hgb>8 and plts>10K. No pre-meds.  - Counts recovering . Plts 27k     3.  ID: Afebrile.   - Prophy: levaquin 250mg po daily as anticipated neutropenic- continue through collections; started fluconazole 200mg daily 1/23. Continue LD Acy 400mg PO BID             4.  GI: no n/v/d  Continue  Protonix for GI prophy (was on this at baseline for GERD).      5.  FEN/Renal:  Elytes stable today     6.) HTN: HTN - now better with  lisinopril 10mg daily started 1/30  - Hx of hyperlipidemia, not currently on tx.     7.) Derm: itching, benadryl at night prn. Changing dressing  Psoriasis, h/o pred use; ok to use topical triamcinolone       RTC Daily for gcsf, labs, provider -   Check CD34 absolute tomorrow   Plan collection later in week     Marleni Darnell MD  Associate Professor of Medicine  [phone]

## 2019-02-03 NOTE — NURSING NOTE
"Oncology Rooming Note    February 3, 2019 9:02 AM   Angel Yanez is a 58 year old male who presents for:    Chief Complaint   Patient presents with     Blood Draw     labs drawn from cvc by rn.  vs taken     RECHECK     pre bmt for MM here for labs and md visit wtih gcsf inj     Initial Vitals: /80 (BP Location: Right arm, Patient Position: Sitting, Cuff Size: Adult Regular)   Pulse 106   Temp 98.6  F (37  C) (Oral)   Resp 16   Wt 83.8 kg (184 lb 11.2 oz)   SpO2 98%   BMI 27.35 kg/m   Estimated body mass index is 27.35 kg/m  as calculated from the following:    Height as of 1/31/19: 1.75 m (5' 8.9\").    Weight as of this encounter: 83.8 kg (184 lb 11.2 oz). Body surface area is 2.02 meters squared.  No Pain (0) Comment: Data Unavailable   No LMP for male patient.  Allergies reviewed: Yes  Medications reviewed: Yes    Medications: Medication refills not needed today.  Pharmacy name entered into EPIC:    [facility] - [place], [place] - [address]  [facility] - [place], [place] - [address] [facility]    Clinical concerns: pt is wondering when he will start collections    0 minutes for nursing intake (face to face time) pt received gcsf inj today in clinic    Eloisa King RN              "

## 2019-02-04 ENCOUNTER — ONCOLOGY VISIT (OUTPATIENT)
Dept: TRANSPLANT | Facility: CLINIC | Age: 59
End: 2019-02-04
Attending: NURSE PRACTITIONER
Payer: COMMERCIAL

## 2019-02-04 ENCOUNTER — HOSPITAL ENCOUNTER (OUTPATIENT)
Dept: LAB | Facility: CLINIC | Age: 59
Discharge: HOME | End: 2019-02-04
Payer: COMMERCIAL

## 2019-02-04 ENCOUNTER — INFUSION THERAPY VISIT (OUTPATIENT)
Dept: TRANSPLANT | Facility: CLINIC | Age: 59
End: 2019-02-04
Attending: PHYSICIAN ASSISTANT
Payer: COMMERCIAL

## 2019-02-04 ENCOUNTER — APPOINTMENT (OUTPATIENT)
Dept: LAB | Facility: CLINIC | Age: 59
End: 2019-02-04
Payer: COMMERCIAL

## 2019-02-04 VITALS
BODY MASS INDEX: 27.7 KG/M2 | WEIGHT: 187 LBS | RESPIRATION RATE: 18 BRPM | SYSTOLIC BLOOD PRESSURE: 117 MMHG | OXYGEN SATURATION: 97 % | HEIGHT: 69 IN | HEART RATE: 89 BPM | DIASTOLIC BLOOD PRESSURE: 80 MMHG | TEMPERATURE: 98.1 F

## 2019-02-04 DIAGNOSIS — C90.00 MULTIPLE MYELOMA (H): Primary | ICD-10-CM

## 2019-02-04 DIAGNOSIS — C90.01 MULTIPLE MYELOMA IN REMISSION (H): Primary | ICD-10-CM

## 2019-02-04 LAB
ACANTHOCYTES BLD QL SMEAR: SLIGHT
BASOPHILS # BLD AUTO: 0 10E9/L (ref 0–0.2)
BASOPHILS NFR BLD AUTO: 0 %
CD34 CELLS # SPEC: NORMAL /UL
CELL THERAPY PRODUCT NUMBER: NORMAL
DIFFERENTIAL METHOD BLD: ABNORMAL
EOSINOPHIL # BLD AUTO: 0 10E9/L (ref 0–0.7)
EOSINOPHIL NFR BLD AUTO: 0 %
ERYTHROCYTE [DISTWIDTH] IN BLOOD BY AUTOMATED COUNT: 12.4 % (ref 10–15)
HCT VFR BLD AUTO: 32.4 % (ref 40–53)
HGB BLD-MCNC: 10.9 G/DL (ref 13.3–17.7)
IFC SPECIMEN: NORMAL
LYMPHOCYTES # BLD AUTO: 0.7 10E9/L (ref 0.8–5.3)
LYMPHOCYTES NFR BLD AUTO: 11.3 %
MCH RBC QN AUTO: 34.4 PG (ref 26.5–33)
MCHC RBC AUTO-ENTMCNC: 33.6 G/DL (ref 31.5–36.5)
MCV RBC AUTO: 102 FL (ref 78–100)
METAMYELOCYTES # BLD: 0.1 10E9/L
METAMYELOCYTES NFR BLD MANUAL: 0.9 %
MONOCYTES # BLD AUTO: 0.8 10E9/L (ref 0–1.3)
MONOCYTES NFR BLD AUTO: 12.2 %
MYELOCYTES # BLD: 0.1 10E9/L
MYELOCYTES NFR BLD MANUAL: 0.9 %
NEUTROPHILS # BLD AUTO: 4.7 10E9/L (ref 1.6–8.3)
NEUTROPHILS NFR BLD AUTO: 74.7 %
NRBC # BLD AUTO: 0.1 10*3/UL
NRBC BLD AUTO-RTO: 1 /100
PLATELET # BLD AUTO: 31 10E9/L (ref 150–450)
PLATELET # BLD EST: ABNORMAL 10*3/UL
POIKILOCYTOSIS BLD QL SMEAR: SLIGHT
RBC # BLD AUTO: 3.17 10E12/L (ref 4.4–5.9)
WBC # BLD AUTO: 6.3 10E9/L (ref 4–11)

## 2019-02-04 PROCEDURE — 25000128 H RX IP 250 OP 636: Mod: ZF | Performed by: NURSE PRACTITIONER

## 2019-02-04 PROCEDURE — G0463 HOSPITAL OUTPT CLINIC VISIT: HCPCS | Mod: 25

## 2019-02-04 PROCEDURE — 25000128 H RX IP 250 OP 636: Mod: ZF | Performed by: PHYSICIAN ASSISTANT

## 2019-02-04 PROCEDURE — 96372 THER/PROPH/DIAG INJ SC/IM: CPT

## 2019-02-04 PROCEDURE — 85025 COMPLETE CBC W/AUTO DIFF WBC: CPT

## 2019-02-04 PROCEDURE — 86367 STEM CELLS TOTAL COUNT: CPT

## 2019-02-04 RX ORDER — PLERIXAFOR 24 MG/1.2ML
0.24 SOLUTION SUBCUTANEOUS DAILY
Status: CANCELLED
Start: 2019-02-05

## 2019-02-04 RX ORDER — PLERIXAFOR 24 MG/1.2ML
0.24 SOLUTION SUBCUTANEOUS DAILY
Status: CANCELLED
Start: 2019-02-04

## 2019-02-04 RX ORDER — PLERIXAFOR 24 MG/1.2ML
0.24 SOLUTION SUBCUTANEOUS DAILY
Status: DISCONTINUED | OUTPATIENT
Start: 2019-02-04 | End: 2019-02-04 | Stop reason: HOSPADM

## 2019-02-04 RX ORDER — HEPARIN SODIUM,PORCINE 10 UNIT/ML
5 VIAL (ML) INTRAVENOUS
Status: CANCELLED | OUTPATIENT
Start: 2019-02-04

## 2019-02-04 RX ORDER — HEPARIN SODIUM,PORCINE 10 UNIT/ML
5 VIAL (ML) INTRAVENOUS
Status: DISCONTINUED | OUTPATIENT
Start: 2019-02-04 | End: 2019-02-04 | Stop reason: HOSPADM

## 2019-02-04 RX ORDER — HEPARIN SODIUM,PORCINE 10 UNIT/ML
5 VIAL (ML) INTRAVENOUS
Status: CANCELLED | OUTPATIENT
Start: 2019-02-05

## 2019-02-04 RX ADMIN — Medication 5 ML: at 08:57

## 2019-02-04 RX ADMIN — PLERIXAFOR 20.4 MG: 24 SOLUTION SUBCUTANEOUS at 15:36

## 2019-02-04 RX ADMIN — FILGRASTIM 900 MCG: 300 INJECTION, SOLUTION INTRAVENOUS; SUBCUTANEOUS at 11:23

## 2019-02-04 ASSESSMENT — MIFFLIN-ST. JEOR: SCORE: 1647.02

## 2019-02-04 ASSESSMENT — PAIN SCALES - GENERAL: PAINLEVEL: NO PAIN (0)

## 2019-02-04 NOTE — PROGRESS NOTES
"BMT Progress Note      Angel Yanez is a 58 year old male with IgA Kappa MM - s/p cytoxan chemo-priming prior stem cell collections for 2nd autologous pbsct.      HPI: Doing well. No medical complaints. Needs a dressing change.    Review of Systems: 10 point ROS negative except as noted above.     Physical Exam:   /80 (BP Location: Right arm, Patient Position: Sitting, Cuff Size: Adult Regular)   Pulse 89   Temp 98.1  F (36.7  C) (Oral)   Resp 18   Ht 1.75 m (5' 8.9\")   Wt 84.8 kg (187 lb)   SpO2 97%   BMI 27.70 kg/m    General: A&O, pleasant, NAD   Eyes: : FARRAH, sclera anicteric   Nose/Mouth/Throat: OP clear, buccal mucosa moist, no ulcerations. Single petechiae right buccal mucosa  Lungs: CTA bilaterally  Cardiovascular: RRR, no M/R/G   Lymphatics: no edema  Skin: No overt rash.  Neuro: A&O   Additional Findings: Hsieh site with some redness under dressing due to skin irritation. Dressing due to be changed today.    Labs:  Lab Results   Component Value Date    WBC 6.3 02/04/2019    ANEU 4.7 02/04/2019    HGB 10.9 (L) 02/04/2019    HCT 32.4 (L) 02/04/2019    PLT 31 (LL) 02/04/2019     02/03/2019    POTASSIUM 3.8 02/03/2019    CHLORIDE 109 02/03/2019    CO2 27 02/03/2019     (H) 02/03/2019    BUN 14 02/03/2019    CR 0.94 02/03/2019    MAG 2.2 01/24/2019    INR 1.02 01/22/2019    AST 25 01/28/2019    ALT 32 01/28/2019       Assessment and Plan    1.  BMT:  Day +14   - s/p chemopriming prior to stem cell collections for 2nd Autologous PBSCT for MM (first tx 2005 - did not collect for 2 transplants at that time). Repeating Auto given long disease free duration (no detectable disease 8455-7587)  1/22/19: Cytoxan   - 900mcg GCSF started 72 hr post cytoxan (1/26/19) and continue through stem cell collections. Goal 5x10^6 cd34/kg  - CD34 undetectable today. Add mozobil today and possible collections tomorrow.      2.  HEME: Keep Hgb>8 and plts>10K. No pre-meds.     3.  ID: Afebrile. "   - Prophy: Continue LD acyclovir but with wbc recovery can stop levaquin and fluconazole.             4.  GI: no n/v/d  Continue  Protonix for GI prophy (was on this at baseline for GERD).      5.  FEN/Renal:  - ordered metabolic panel tomorrow.      6.) HTN: HTN - now better with  lisinopril 10mg daily started 1/30  - Hx of hyperlipidemia, not currently on tx.     7.) Derm: itching, benadryl at night prn. Changing dressing  Psoriasis, h/o pred use; ok to use topical triamcinolone       RTC Daily for gcsf, labs, provider, possible collections, JESICA MartinezC  #[phone]

## 2019-02-04 NOTE — NURSING NOTE
Chief Complaint   Patient presents with     Blood Draw     Labs drawn via CVC by RN in lab. Lines flushed and hep locked - CAPS changed, DUE FOR DRESSING CHANGE. VS Taken.     Clau Sibley RN

## 2019-02-04 NOTE — NURSING NOTE
"Oncology Rooming Note    February 4, 2019 10:55 AM   Angel Yanez is a 58 year old male who presents for:    Chief Complaint   Patient presents with     Blood Draw     Labs drawn via CVC by RN in lab. Lines flushed and hep locked - CAPS changed, DUE FOR DRESSING CHANGE. VS Taken.     RECHECK     Return: Multiple myeloma      Initial Vitals: /80 (BP Location: Right arm, Patient Position: Sitting, Cuff Size: Adult Regular)   Pulse 89   Temp 98.1  F (36.7  C) (Oral)   Resp 18   Ht 1.75 m (5' 8.9\")   Wt 84.8 kg (187 lb)   SpO2 97%   BMI 27.70 kg/m   Estimated body mass index is 27.7 kg/m  as calculated from the following:    Height as of this encounter: 1.75 m (5' 8.9\").    Weight as of this encounter: 84.8 kg (187 lb). Body surface area is 2.03 meters squared.  No Pain (0) Comment: Data Unavailable   No LMP for male patient.  Allergies reviewed: Yes  Medications reviewed: Yes    Medications: Medication refills not needed today.  Pharmacy name entered into EPIC:    [facility] - [place] - [address]  [facility] - [place], [place] - [address]    Clinical concerns: N/A    5 minutes for nursing intake (face to face time)     Yesica Duncan CMA              "

## 2019-02-04 NOTE — NURSING NOTE
Chief Complaint   Patient presents with     Infusion     pt here for sq injection of Mozobil for MM

## 2019-02-04 NOTE — NURSING NOTE
Dressing change completed sterile technique used. Site WDL betadine used for cleaning, primapore dressing used. Patient tolerated dressing change. See Dock Flowsheet.     Yesica Duncan MA

## 2019-02-04 NOTE — PROGRESS NOTES
Infusion Nursing Note:  Angel SUMMER Yanez presents today for subcutaneous Mozobil.    Patient seen by provider today: Yes: Cassie URIBE   present during visit today: Not Applicable.    Note: Patient here for subcutaneous mozobil injection. He received gcsf injection earlier in clinic. The Mozbobil injection was given in the right arm. He waited 30 minutes after injection and no side affects noticed.     Intravenous Access:  n/a.    Treatment Conditions:  Not Applicable.      Post Infusion Assessment:  Patient tolerated injection without incident.    Discharge Plan:   Patient discharged in stable condition accompanied by: self.  Departure Mode: Ambulatory.    ANKITA SINCLAIR RN

## 2019-02-05 ENCOUNTER — INFUSION THERAPY VISIT (OUTPATIENT)
Dept: TRANSPLANT | Facility: CLINIC | Age: 59
End: 2019-02-05
Attending: PHYSICIAN ASSISTANT
Payer: COMMERCIAL

## 2019-02-05 VITALS
HEART RATE: 106 BPM | WEIGHT: 185.1 LBS | TEMPERATURE: 98.3 F | HEIGHT: 69 IN | RESPIRATION RATE: 16 BRPM | SYSTOLIC BLOOD PRESSURE: 111 MMHG | DIASTOLIC BLOOD PRESSURE: 77 MMHG | BODY MASS INDEX: 27.42 KG/M2 | OXYGEN SATURATION: 100 %

## 2019-02-05 DIAGNOSIS — C90.01 MULTIPLE MYELOMA IN REMISSION (H): Primary | ICD-10-CM

## 2019-02-05 LAB
ANION GAP SERPL CALCULATED.3IONS-SCNC: 7 MMOL/L (ref 3–14)
BASOPHILS # BLD AUTO: 0 10E9/L (ref 0–0.2)
BASOPHILS NFR BLD AUTO: 0 %
BUN SERPL-MCNC: 14 MG/DL (ref 7–30)
CALCIUM SERPL-MCNC: 8.6 MG/DL (ref 8.5–10.1)
CD34 CELLS # SPEC: 2 /UL
CD34 CELLS NFR SPEC: 0.02 %
CELL THERAPY PRODUCT NUMBER: NORMAL
CHLORIDE SERPL-SCNC: 109 MMOL/L (ref 94–109)
CO2 SERPL-SCNC: 25 MMOL/L (ref 20–32)
CREAT SERPL-MCNC: 0.91 MG/DL (ref 0.66–1.25)
DIFFERENTIAL METHOD BLD: ABNORMAL
EOSINOPHIL # BLD AUTO: 0 10E9/L (ref 0–0.7)
EOSINOPHIL NFR BLD AUTO: 0 %
ERYTHROCYTE [DISTWIDTH] IN BLOOD BY AUTOMATED COUNT: 12.6 % (ref 10–15)
GFR SERPL CREATININE-BSD FRML MDRD: >90 ML/MIN/{1.73_M2}
GLUCOSE SERPL-MCNC: 122 MG/DL (ref 70–99)
HCT VFR BLD AUTO: 33.7 % (ref 40–53)
HGB BLD-MCNC: 11.2 G/DL (ref 13.3–17.7)
IFC SPECIMEN: NORMAL
LYMPHOCYTES # BLD AUTO: 2.1 10E9/L (ref 0.8–5.3)
LYMPHOCYTES NFR BLD AUTO: 14.9 %
MCH RBC QN AUTO: 34.7 PG (ref 26.5–33)
MCHC RBC AUTO-ENTMCNC: 33.2 G/DL (ref 31.5–36.5)
MCV RBC AUTO: 104 FL (ref 78–100)
MONOCYTES # BLD AUTO: 1.1 10E9/L (ref 0–1.3)
MONOCYTES NFR BLD AUTO: 7.9 %
MYELOCYTES # BLD: 0.3 10E9/L
MYELOCYTES NFR BLD MANUAL: 1.8 %
NEUTROPHILS # BLD AUTO: 10.8 10E9/L (ref 1.6–8.3)
NEUTROPHILS NFR BLD AUTO: 75.4 %
NRBC # BLD AUTO: 0.1 10*3/UL
NRBC BLD AUTO-RTO: 1 /100
OVALOCYTES BLD QL SMEAR: SLIGHT
PLATELET # BLD AUTO: 30 10E9/L (ref 150–450)
PLATELET # BLD EST: ABNORMAL 10*3/UL
POIKILOCYTOSIS BLD QL SMEAR: SLIGHT
POTASSIUM SERPL-SCNC: 3.9 MMOL/L (ref 3.4–5.3)
RBC # BLD AUTO: 3.23 10E12/L (ref 4.4–5.9)
SODIUM SERPL-SCNC: 141 MMOL/L (ref 133–144)
VIABLE CD34 CELLS NFR FLD: 63.75 %
WBC # BLD AUTO: 14.3 10E9/L (ref 4–11)

## 2019-02-05 PROCEDURE — G0463 HOSPITAL OUTPT CLINIC VISIT: HCPCS | Mod: ZF

## 2019-02-05 PROCEDURE — 25000128 H RX IP 250 OP 636: Mod: ZF | Performed by: PHYSICIAN ASSISTANT

## 2019-02-05 PROCEDURE — 80048 BASIC METABOLIC PNL TOTAL CA: CPT | Performed by: PHYSICIAN ASSISTANT

## 2019-02-05 PROCEDURE — 86367 STEM CELLS TOTAL COUNT: CPT | Performed by: PHYSICIAN ASSISTANT

## 2019-02-05 PROCEDURE — 85025 COMPLETE CBC W/AUTO DIFF WBC: CPT | Performed by: PHYSICIAN ASSISTANT

## 2019-02-05 PROCEDURE — 25000128 H RX IP 250 OP 636: Mod: JW,ZF | Performed by: PHYSICIAN ASSISTANT

## 2019-02-05 RX ORDER — PLERIXAFOR 24 MG/1.2ML
0.24 SOLUTION SUBCUTANEOUS DAILY
Status: CANCELLED
Start: 2019-02-05

## 2019-02-05 RX ORDER — HEPARIN SODIUM,PORCINE 10 UNIT/ML
5 VIAL (ML) INTRAVENOUS
Status: CANCELLED | OUTPATIENT
Start: 2019-02-05

## 2019-02-05 RX ORDER — HEPARIN SODIUM,PORCINE 10 UNIT/ML
5 VIAL (ML) INTRAVENOUS ONCE
Status: COMPLETED | OUTPATIENT
Start: 2019-02-05 | End: 2019-02-05

## 2019-02-05 RX ORDER — PLERIXAFOR 24 MG/1.2ML
0.24 SOLUTION SUBCUTANEOUS DAILY
Status: DISCONTINUED | OUTPATIENT
Start: 2019-02-05 | End: 2019-02-05 | Stop reason: HOSPADM

## 2019-02-05 RX ORDER — HEPARIN SODIUM,PORCINE 10 UNIT/ML
5 VIAL (ML) INTRAVENOUS
Status: CANCELLED | OUTPATIENT
Start: 2019-02-06

## 2019-02-05 RX ORDER — PLERIXAFOR 24 MG/1.2ML
0.24 SOLUTION SUBCUTANEOUS DAILY
Status: CANCELLED
Start: 2019-02-06

## 2019-02-05 RX ADMIN — PLERIXAFOR 20.2 MG: 24 SOLUTION SUBCUTANEOUS at 15:22

## 2019-02-05 RX ADMIN — Medication 5 ML: at 07:36

## 2019-02-05 RX ADMIN — FILGRASTIM 900 MCG: 300 INJECTION, SOLUTION INTRAVENOUS; SUBCUTANEOUS at 08:31

## 2019-02-05 RX ADMIN — Medication 5 ML: at 07:35

## 2019-02-05 ASSESSMENT — MIFFLIN-ST. JEOR: SCORE: 1638.4

## 2019-02-05 ASSESSMENT — PAIN SCALES - GENERAL: PAINLEVEL: NO PAIN (0)

## 2019-02-05 NOTE — NURSING NOTE
Dressing change completed sterile technique used. Site WDL except drainage, IV 3000 dressing used, extra gauze used. Stat lock removed and replaced. Patient tolerated dressing change. See Dock Flowsheet.     Yesica Duncan MA

## 2019-02-05 NOTE — PROGRESS NOTES
Infusion Nursing Note:  Angel Yanez presents today for Mozobil.    Patient seen by provider today: Yes   present during visit today: Not Applicable.    Note: Pt given subcutaneous Mozobil into left upper arm.  Tolerated without incident.    Intravenous Access:  No Intravenous access/labs at this visit.    Treatment Conditions:  Not Applicable.      Post Infusion Assessment:  Patient tolerated injection without incident.    Discharge Plan:   Discharge instructions reviewed with: Patient.  Patient and/or family verbalized understanding of discharge instructions and all questions answered.  Patient discharged in stable condition accompanied by: self.  Departure Mode: Ambulatory.    Rosana Brown RN

## 2019-02-05 NOTE — PROGRESS NOTES
"BMT Progress Note      Angel Yanez is a 58 year old male with IgA Kappa MM - s/p cytoxan chemo-priming prior stem cell collections for 2nd autologous pbsct.      HPI: Doing well other then still some serosanginous drainage from line. No fevers. No redness after changing dressing types.     Review of Systems: 10 point ROS negative except as noted above.     Physical Exam:   /77 (BP Location: Right arm, Patient Position: Sitting, Cuff Size: Adult Regular)   Pulse 106   Temp 98.3  F (36.8  C) (Oral)   Resp 16   Ht 1.75 m (5' 8.9\")   Wt 84 kg (185 lb 1.6 oz)   SpO2 100%   BMI 27.41 kg/m    General: A&O, pleasant, NAD   Eyes: FARRAH, sclera anicteric   Nose/Mouth/Throat: OP clear, buccal mucosa moist, no ulcerations. Single petechiae right buccal mucosa  Lungs: CTA bilaterally  Cardiovascular: RRR, no M/R/G   Lymphatics: no edema  Skin: No overt rash.  Neuro: A&O   Additional Findings: Hsieh site looks great other then some serosanginous drainage.     Labs:  Lab Results   Component Value Date    WBC 14.3 (H) 02/05/2019    ANEU 4.7 02/04/2019    HGB 11.2 (L) 02/05/2019    HCT 33.7 (L) 02/05/2019    PLT 30 (LL) 02/05/2019     02/03/2019    POTASSIUM 3.8 02/03/2019    CHLORIDE 109 02/03/2019    CO2 27 02/03/2019     (H) 02/03/2019    BUN 14 02/03/2019    CR 0.94 02/03/2019    MAG 2.2 01/24/2019    INR 1.02 01/22/2019    AST 25 01/28/2019    ALT 32 01/28/2019       Assessment and Plan    1.  BMT:  Day +15   - s/p chemopriming prior to stem cell collections for 2nd Autologous PBSCT for MM (first tx 2005 - did not collect for 2 transplants at that time). Repeating Auto given long disease free duration (no detectable disease 0911-0671)  1/22/19: Cytoxan   - 900mcg GCSF started 72 hr post cytoxan (1/26/19) and continue through stem cell collections. Goal 5x10^6 cd34/kg  - CD34 undetectable 2/4 added mozobil.  - CD34 pending.     2.  HEME: Keep Hgb>8 and plts>10K. No pre-meds.     3.  ID: "   - Prophy: Continue LD acyclovir             4.  GI:   Continue  Protonix for GI prophy (was on this at baseline for GERD).      5.  FEN/Renal:  - Monitor Cr/lytes.      6.) HTN:   - HTN: lisinopril 10mg daily.   - Hx of hyperlipidemia, not currently on tx.     7.) Derm:   - itching, benadryl at night prn.   - psoriasis, h/o pred use; ok to use topical triamcinolone     8.) CVC:  - Still with some serosanginous drainage from exit site. No redness. No fever. No pain. Monitor for now. Dressing changes.       RTC Daily for gcsf, labs, provider, possible collections, tim Ferrari PA-C  #[id number]

## 2019-02-05 NOTE — NURSING NOTE
"Oncology Rooming Note    February 5, 2019 7:52 AM   Angel Yanez is a 58 year old male who presents for:    Chief Complaint   Patient presents with     Port Draw     Labs drawn via CVC by RN in lab. Vs taken. Pt checked in for next appt     RECHECK     Return: Multiple myeloma      Initial Vitals: /77 (BP Location: Right arm, Patient Position: Sitting, Cuff Size: Adult Regular)   Pulse 106   Temp 98.3  F (36.8  C) (Oral)   Resp 16   Ht 1.75 m (5' 8.9\")   Wt 84 kg (185 lb 1.6 oz)   SpO2 100%   BMI 27.41 kg/m   Estimated body mass index is 27.41 kg/m  as calculated from the following:    Height as of this encounter: 1.75 m (5' 8.9\").    Weight as of this encounter: 84 kg (185 lb 1.6 oz). Body surface area is 2.02 meters squared.  No Pain (0) Comment: Data Unavailable   No LMP for male patient.  Allergies reviewed: Yes  Medications reviewed: Yes    Medications: Medication refills not needed today.  Pharmacy name entered into EPIC:    [facility] - [place], [place] - [address] [address]  [facility] [address] - [place], [place] [address]    Clinical concerns: N/A     5 minutes for nursing intake (face to face time)     Yesica Duncan CMA              "

## 2019-02-05 NOTE — NURSING NOTE
Chief Complaint   Patient presents with     Port Draw     Labs drawn via CVC by RN in lab. Vs taken. Pt checked in for next appt     Labs collected from CVC by RN, line flushed with saline and heparin.  Vitals taken. Pt checked in for appointment(s).    Leslie MAXWELL RN PHN BSN  BMT/Oncology Lab

## 2019-02-06 ENCOUNTER — ONCOLOGY VISIT (OUTPATIENT)
Dept: TRANSPLANT | Facility: CLINIC | Age: 59
End: 2019-02-06
Attending: STUDENT IN AN ORGANIZED HEALTH CARE EDUCATION/TRAINING PROGRAM
Payer: COMMERCIAL

## 2019-02-06 ENCOUNTER — APPOINTMENT (OUTPATIENT)
Dept: LAB | Facility: CLINIC | Age: 59
End: 2019-02-06
Attending: STUDENT IN AN ORGANIZED HEALTH CARE EDUCATION/TRAINING PROGRAM
Payer: COMMERCIAL

## 2019-02-06 VITALS
TEMPERATURE: 97.7 F | BODY MASS INDEX: 27.3 KG/M2 | OXYGEN SATURATION: 100 % | HEART RATE: 120 BPM | WEIGHT: 184.3 LBS | SYSTOLIC BLOOD PRESSURE: 121 MMHG | RESPIRATION RATE: 16 BRPM | DIASTOLIC BLOOD PRESSURE: 77 MMHG

## 2019-02-06 DIAGNOSIS — C90.01 MULTIPLE MYELOMA IN REMISSION (H): Primary | ICD-10-CM

## 2019-02-06 LAB
ANION GAP SERPL CALCULATED.3IONS-SCNC: 7 MMOL/L (ref 3–14)
BASOPHILS # BLD AUTO: 0 10E9/L (ref 0–0.2)
BASOPHILS NFR BLD AUTO: 0 %
BUN SERPL-MCNC: 17 MG/DL (ref 7–30)
CALCIUM SERPL-MCNC: 8.8 MG/DL (ref 8.5–10.1)
CD34 CELLS # SPEC: 2 /UL
CD34 CELLS NFR SPEC: 0.01 %
CELL THERAPY PRODUCT NUMBER: NORMAL
CHLORIDE SERPL-SCNC: 109 MMOL/L (ref 94–109)
CO2 SERPL-SCNC: 24 MMOL/L (ref 20–32)
CREAT SERPL-MCNC: 1 MG/DL (ref 0.66–1.25)
DIFFERENTIAL METHOD BLD: ABNORMAL
EOSINOPHIL # BLD AUTO: 0.2 10E9/L (ref 0–0.7)
EOSINOPHIL NFR BLD AUTO: 0.9 %
ERYTHROCYTE [DISTWIDTH] IN BLOOD BY AUTOMATED COUNT: 13 % (ref 10–15)
GFR SERPL CREATININE-BSD FRML MDRD: 81 ML/MIN/{1.73_M2}
GLUCOSE SERPL-MCNC: 98 MG/DL (ref 70–99)
HCT VFR BLD AUTO: 33.8 % (ref 40–53)
HGB BLD-MCNC: 11.2 G/DL (ref 13.3–17.7)
IFC SPECIMEN: NORMAL
LYMPHOCYTES # BLD AUTO: 1.5 10E9/L (ref 0.8–5.3)
LYMPHOCYTES NFR BLD AUTO: 8.7 %
MCH RBC QN AUTO: 34.3 PG (ref 26.5–33)
MCHC RBC AUTO-ENTMCNC: 33.1 G/DL (ref 31.5–36.5)
MCV RBC AUTO: 103 FL (ref 78–100)
METAMYELOCYTES # BLD: 0.3 10E9/L
METAMYELOCYTES NFR BLD MANUAL: 1.7 %
MONOCYTES # BLD AUTO: 0.9 10E9/L (ref 0–1.3)
MONOCYTES NFR BLD AUTO: 5.2 %
NEUTROPHILS # BLD AUTO: 14 10E9/L (ref 1.6–8.3)
NEUTROPHILS NFR BLD AUTO: 83.5 %
PLATELET # BLD AUTO: 34 10E9/L (ref 150–450)
PLATELET # BLD EST: ABNORMAL 10*3/UL
POTASSIUM SERPL-SCNC: 3.9 MMOL/L (ref 3.4–5.3)
RBC # BLD AUTO: 3.27 10E12/L (ref 4.4–5.9)
RBC MORPH BLD: NORMAL
SODIUM SERPL-SCNC: 140 MMOL/L (ref 133–144)
VIABLE CD34 CELLS NFR FLD: 81.1 %
WBC # BLD AUTO: 16.8 10E9/L (ref 4–11)

## 2019-02-06 PROCEDURE — 80048 BASIC METABOLIC PNL TOTAL CA: CPT | Performed by: PHYSICIAN ASSISTANT

## 2019-02-06 PROCEDURE — 86367 STEM CELLS TOTAL COUNT: CPT | Performed by: PHYSICIAN ASSISTANT

## 2019-02-06 PROCEDURE — 25000128 H RX IP 250 OP 636: Mod: ZF | Performed by: PHYSICIAN ASSISTANT

## 2019-02-06 PROCEDURE — 25000128 H RX IP 250 OP 636: Mod: ZF | Performed by: STUDENT IN AN ORGANIZED HEALTH CARE EDUCATION/TRAINING PROGRAM

## 2019-02-06 PROCEDURE — 85025 COMPLETE CBC W/AUTO DIFF WBC: CPT | Performed by: PHYSICIAN ASSISTANT

## 2019-02-06 RX ORDER — HEPARIN SODIUM (PORCINE) LOCK FLUSH IV SOLN 100 UNIT/ML 100 UNIT/ML
3 SOLUTION INTRAVENOUS
Status: CANCELLED | OUTPATIENT
Start: 2019-02-06

## 2019-02-06 RX ORDER — HEPARIN SODIUM,PORCINE 10 UNIT/ML
5 VIAL (ML) INTRAVENOUS ONCE
Status: COMPLETED | OUTPATIENT
Start: 2019-02-06 | End: 2019-02-06

## 2019-02-06 RX ORDER — HEPARIN SODIUM,PORCINE 10 UNIT/ML
5 VIAL (ML) INTRAVENOUS
Status: CANCELLED | OUTPATIENT
Start: 2019-02-06

## 2019-02-06 RX ORDER — PLERIXAFOR 24 MG/1.2ML
0.24 SOLUTION SUBCUTANEOUS DAILY
Status: CANCELLED
Start: 2019-02-06

## 2019-02-06 RX ADMIN — Medication 5 ML: at 07:56

## 2019-02-06 RX ADMIN — FILGRASTIM 900 MCG: 300 INJECTION, SOLUTION INTRAVENOUS; SUBCUTANEOUS at 08:11

## 2019-02-06 RX ADMIN — Medication 5 ML: at 07:55

## 2019-02-06 ASSESSMENT — PAIN SCALES - GENERAL: PAINLEVEL: NO PAIN (0)

## 2019-02-06 NOTE — NURSING NOTE
Neupogen 900 MCG. Sub-Q injection Right arm . See MAR. Pt. Tolerated well.    Kathy Perez MA

## 2019-02-06 NOTE — NURSING NOTE
"Oncology Rooming Note    February 6, 2019 8:06 AM   Angel Yanez is a 58 year old male who presents for:    Chief Complaint   Patient presents with     Port Draw     Labs drawn via CVC by RN in lab. VS taken. Pt checked in for next appt     RECHECK     RTN- MM     Initial Vitals: /77 (BP Location: Right arm, Patient Position: Sitting, Cuff Size: Adult Regular)   Pulse 120   Temp 97.7  F (36.5  C) (Oral)   Resp 16   Wt 83.6 kg (184 lb 4.8 oz)   SpO2 100%   BMI 27.30 kg/m   Estimated body mass index is 27.3 kg/m  as calculated from the following:    Height as of 2/5/19: 1.75 m (5' 8.9\").    Weight as of this encounter: 83.6 kg (184 lb 4.8 oz). Body surface area is 2.02 meters squared.  No Pain (0) Comment: Data Unavailable   No LMP for male patient.  Allergies reviewed: Yes  Medications reviewed: Yes    Medications: Medication refills not needed today.  Pharmacy name entered into EPIC:    [facility] - [place], [place] - [address]  [facility] - [place], [place] - [address] [facility]    Clinical concerns: None         Kathy Perez CMA              "

## 2019-02-06 NOTE — PROGRESS NOTES
BMT Progress Note      Angel Yanez is a 58 year old male with IgA Kappa MM - s/p cytoxan chemo-priming prior stem cell collections for 2nd autologous pbsct.      HPI: serosanginous drainage now minimal. No new issues. A little achy last night with mozobil and G. Taking tylenol and claritin.     Review of Systems: 10 point ROS negative except as noted above.     Physical Exam:   /77 (BP Location: Right arm, Patient Position: Sitting, Cuff Size: Adult Regular)   Pulse 120   Temp 97.7  F (36.5  C) (Oral)   Resp 16   Wt 83.6 kg (184 lb 4.8 oz)   SpO2 100%   BMI 27.30 kg/m    General: A&O, pleasant, NAD   Eyes: FARRAH, sclera anicteric   Nose/Mouth/Throat: OP clear, buccal mucosa moist, no ulcerations. Single petechiae right buccal mucosa  Lungs: CTA bilaterally  Cardiovascular: RRR, no M/R/G   Lymphatics: no edema  Skin: No overt rash.  Neuro: A&O   Additional Findings: Hsieh site looks great other then some serosanginous drainage but much less today.    Labs:  Lab Results   Component Value Date    WBC 14.3 (H) 02/05/2019    ANEU 10.8 (H) 02/05/2019    HGB 11.2 (L) 02/05/2019    HCT 33.7 (L) 02/05/2019    PLT 30 (LL) 02/05/2019     02/05/2019    POTASSIUM 3.9 02/05/2019    CHLORIDE 109 02/05/2019    CO2 25 02/05/2019     (H) 02/05/2019    BUN 14 02/05/2019    CR 0.91 02/05/2019    MAG 2.2 01/24/2019    INR 1.02 01/22/2019    AST 25 01/28/2019    ALT 32 01/28/2019       Assessment and Plan    1.  BMT:  Day +16   - s/p chemopriming prior to stem cell collections for 2nd Autologous PBSCT for MM (first tx 2005 - did not collect for 2 transplants at that time). Repeating Auto given long disease free duration (no detectable disease 6527-4065)  1/22/19: Cytoxan   - 900mcg GCSF started 72 hr post cytoxan (1/26/19) and continue through stem cell collections. Goal 5x10^6 cd34/kg  - CD34 undetectable 2/4 added mozobil.  - CD34 2 2/5. Given 2nd dose of mozobil.  - CD34 today still 2. Plts still on slow  side to recover. Discussed with RUSSELL ([facility] not available). Will not give mozobil today. Received GCSF today. Will send CD34 in am and also see what peripheral counts are doing. Dr. Darnell wondering if mozobil started to early. May not be able to collect peripherally. Guille aware of this. Briefly discussed sometimes we perform a BM harvest for stem cells.     2.  HEME: Keep Hgb>8 and plts>10K. No pre-meds.     3.  ID:   - Prophy: Continue LD acyclovir             4.  GI:   Continue  Protonix for GI prophy (was on this at baseline for GERD).      5.  FEN/Renal:  - Monitor Cr/lytes.      6.) HTN:   - HTN: lisinopril 10mg daily.   - Hx of hyperlipidemia, not currently on tx.     7.) Derm:   - itching, benadryl at night prn.   - psoriasis, h/o pred use; ok to use topical triamcinolone     8.) CVC:  - Still with some serosanginous drainage from exit site. No redness. No fever. No pain. Now improving. Changing dressing daily. Likely related to tunnel edema that is now resolving.        RTC Daily for gcsf, labs, provider, possible collections, assess for mozobil need.    Cassie Ferrari PA-C  #[id number]

## 2019-02-06 NOTE — PROGRESS NOTES
BMT Clinic Note      Angel Yanez is a 58 year old male with IgA Kappa MM - s/p cytoxan chemo-priming prior stem cell collections for 2nd autologous pbsct.      HPI: Guille is feeling good.  Tolerating GCSF with minimal achiness. Taking tylenol and claritin. Afebrile with no cough, congestion or dyspnea.  Eating with no N/V/D.  No bleeding.  Central line NT, dressing is dry today, no drainage overnight.     Review of Systems: 10 point ROS negative except as noted above.     Physical Exam:   /79   Pulse 111   Temp 97.9  F (36.6  C)   Resp 16   Wt 84.8 kg (187 lb)   SpO2 100%   BMI 27.70 kg/m    General: A&O, pleasant, NAD   Eyes: FARRAH, sclera anicteric   Nose/Mouth/Throat: OP clear, buccal mucosa moist, no ulcerations.   Lungs: CTA bilaterally  Cardiovascular: RRR, no M/R/G   Lymphatics: no edema  Skin: No rash.  Neuro: A&O   Additional Findings: Hsieh site NT, no drainage    Labs:  Lab Results   Component Value Date    WBC 17.9 (H) 02/07/2019    ANEU 14.0 (H) 02/06/2019    HGB 10.6 (L) 02/07/2019    HCT 32.0 (L) 02/07/2019    PLT 37 (LL) 02/07/2019     02/07/2019    POTASSIUM 3.9 02/07/2019    CHLORIDE 110 (H) 02/07/2019    CO2 24 02/07/2019     (H) 02/07/2019    BUN 14 02/07/2019    CR 0.94 02/07/2019    MAG 2.2 01/24/2019    INR 1.02 01/22/2019    AST 25 01/28/2019    ALT 32 01/28/2019       Assessment and Plan:  58 year old male with IgA Kappa MM - s/p cytoxan chemo-priming prior stem cell collections for 2nd autologous pbsct.       1.  BMT:  Day +17   - s/p chemopriming prior to stem cell collections for 2nd Autologous PBSCT for MM (first tx 2005 - did not collect for 2 transplants at that time). Repeating Auto given long disease free duration (no detectable disease 7351-9348)  1/22/19: Cytoxan   - 900mcg GCSF started 72 hr post cytoxan (1/26/19) and continue through stem cell collections. Goal 5x10^6 cd34/kg  - CD34 undetectable 2/4 added mozobil.  - CD34 2 2/5. Given 2nd dose of  mozobil.  - CD34 today still 2. Discussed with RUSSELL ([facility] not available). Plan to obtain a BMBX next week for cellularity & if that is adequate we will begin GMCSF & harvest on day 5.     2.  HEME: Keep Hgb>8 and plts>10K. No pre-meds.     3.  ID:   - Prophy: Continue LD acyclovir             4.  GI:   Continue  Protonix for GI prophy (was on this at baseline for GERD).      5.  HTN:   - HTN: lisinopril 10mg daily.   - Hx of hyperlipidemia, not currently on tx.     6.  Derm:   - itching, benadryl at night prn.   - psoriasis, h/o pred use; ok to use topical triamcinolone     RTC tomorrow for dressing change & line flush.  Contacted [employer] to get home supplies for line care  Schedule BMBX for next week & follow-up with RUSSELL Manzo

## 2019-02-07 ENCOUNTER — ONCOLOGY VISIT (OUTPATIENT)
Dept: TRANSPLANT | Facility: CLINIC | Age: 59
End: 2019-02-07
Attending: NURSE PRACTITIONER
Payer: COMMERCIAL

## 2019-02-07 ENCOUNTER — APPOINTMENT (OUTPATIENT)
Dept: LAB | Facility: CLINIC | Age: 59
End: 2019-02-07
Attending: NURSE PRACTITIONER
Payer: COMMERCIAL

## 2019-02-07 VITALS
HEART RATE: 111 BPM | BODY MASS INDEX: 27.7 KG/M2 | DIASTOLIC BLOOD PRESSURE: 79 MMHG | RESPIRATION RATE: 16 BRPM | SYSTOLIC BLOOD PRESSURE: 134 MMHG | OXYGEN SATURATION: 100 % | WEIGHT: 187 LBS | TEMPERATURE: 97.9 F

## 2019-02-07 DIAGNOSIS — C90.01 MULTIPLE MYELOMA IN REMISSION (H): Primary | ICD-10-CM

## 2019-02-07 LAB
ANION GAP SERPL CALCULATED.3IONS-SCNC: 9 MMOL/L (ref 3–14)
BASOPHILS # BLD AUTO: 0 10E9/L (ref 0–0.2)
BASOPHILS NFR BLD AUTO: 0 %
BUN SERPL-MCNC: 14 MG/DL (ref 7–30)
CALCIUM SERPL-MCNC: 8.4 MG/DL (ref 8.5–10.1)
CD34 CELLS # SPEC: 2 /UL
CD34 CELLS NFR SPEC: 0.01 %
CELL THERAPY PRODUCT NUMBER: NORMAL
CHLORIDE SERPL-SCNC: 110 MMOL/L (ref 94–109)
CO2 SERPL-SCNC: 24 MMOL/L (ref 20–32)
CREAT SERPL-MCNC: 0.94 MG/DL (ref 0.66–1.25)
DIFFERENTIAL METHOD BLD: ABNORMAL
DLCOCOR-%PRED-PRE: 95 %
DLCOCOR-PRE: 26.7 ML/MIN/MMHG
DLCOUNC-%PRED-PRE: 93 %
DLCOUNC-PRE: 26.11 ML/MIN/MMHG
DLCOUNC-PRED: 27.87 ML/MIN/MMHG
EOSINOPHIL # BLD AUTO: 0 10E9/L (ref 0–0.7)
EOSINOPHIL NFR BLD AUTO: 0 %
ERV-%PRED-PRE: 31 %
ERV-PRE: 0.35 L
ERV-PRED: 1.12 L
ERYTHROCYTE [DISTWIDTH] IN BLOOD BY AUTOMATED COUNT: 13.2 % (ref 10–15)
EXPTIME-PRE: 8.14 SEC
FEF2575-%PRED-PRE: 97 %
FEF2575-PRE: 2.67 L/SEC
FEF2575-PRED: 2.73 L/SEC
FEFMAX-%PRED-PRE: 101 %
FEFMAX-PRE: 9.1 L/SEC
FEFMAX-PRED: 8.95 L/SEC
FEV1-%PRED-PRE: 94 %
FEV1-PRE: 3.06 L
FEV1FEV6-PRE: 79 %
FEV1FEV6-PRED: 79 %
FEV1FVC-PRE: 78 %
FEV1FVC-PRED: 76 %
FEV1SVC-PRE: 71 %
FEV1SVC-PRED: 67 %
FIFMAX-PRE: 7.32 L/SEC
FIO2-PRE: 21 %
FRCPLETH-%PRED-PRE: 76 %
FRCPLETH-PRE: 2.73 L
FRCPLETH-PRED: 3.56 L
FVC-%PRED-PRE: 95 %
FVC-PRE: 3.92 L
FVC-PRED: 4.11 L
GFR SERPL CREATININE-BSD FRML MDRD: 88 ML/MIN/{1.73_M2}
GLUCOSE SERPL-MCNC: 106 MG/DL (ref 70–99)
HCT VFR BLD AUTO: 32 % (ref 40–53)
HGB BLD-MCNC: 10.6 G/DL (ref 13.3–17.7)
IC-%PRED-PRE: 106 %
IC-PRE: 3.92 L
IC-PRED: 3.68 L
IFC SPECIMEN: NORMAL
LYMPHOCYTES # BLD AUTO: 0.3 10E9/L (ref 0.8–5.3)
LYMPHOCYTES NFR BLD AUTO: 1.7 %
MCH RBC QN AUTO: 34.5 PG (ref 26.5–33)
MCHC RBC AUTO-ENTMCNC: 33.1 G/DL (ref 31.5–36.5)
MCV RBC AUTO: 104 FL (ref 78–100)
MONOCYTES # BLD AUTO: 1.7 10E9/L (ref 0–1.3)
MONOCYTES NFR BLD AUTO: 9.6 %
NEUTROPHILS # BLD AUTO: 15.9 10E9/L (ref 1.6–8.3)
NEUTROPHILS NFR BLD AUTO: 88.7 %
PLATELET # BLD AUTO: 37 10E9/L (ref 150–450)
PLATELET # BLD EST: ABNORMAL 10*3/UL
POTASSIUM SERPL-SCNC: 3.9 MMOL/L (ref 3.4–5.3)
RBC # BLD AUTO: 3.07 10E12/L (ref 4.4–5.9)
RBC MORPH BLD: NORMAL
RVPLETH-%PRED-PRE: 98 %
RVPLETH-PRE: 2.38 L
RVPLETH-PRED: 2.4 L
SODIUM SERPL-SCNC: 142 MMOL/L (ref 133–144)
TLCPLETH-%PRED-PRE: 96 %
TLCPLETH-PRE: 6.65 L
TLCPLETH-PRED: 6.92 L
VA-%PRED-PRE: 87 %
VA-PRE: 5.8 L
VC-%PRED-PRE: 89 %
VC-PRE: 4.28 L
VC-PRED: 4.8 L
VIABLE CD34 CELLS NFR FLD: 82.93 %
WBC # BLD AUTO: 17.9 10E9/L (ref 4–11)

## 2019-02-07 PROCEDURE — 25000128 H RX IP 250 OP 636: Mod: ZF | Performed by: PHYSICIAN ASSISTANT

## 2019-02-07 PROCEDURE — G0463 HOSPITAL OUTPT CLINIC VISIT: HCPCS | Mod: ZF

## 2019-02-07 PROCEDURE — 86367 STEM CELLS TOTAL COUNT: CPT | Performed by: PHYSICIAN ASSISTANT

## 2019-02-07 PROCEDURE — 85025 COMPLETE CBC W/AUTO DIFF WBC: CPT | Performed by: PHYSICIAN ASSISTANT

## 2019-02-07 PROCEDURE — 80048 BASIC METABOLIC PNL TOTAL CA: CPT | Performed by: PHYSICIAN ASSISTANT

## 2019-02-07 RX ORDER — HEPARIN SODIUM,PORCINE 10 UNIT/ML
5 VIAL (ML) INTRAVENOUS
Status: DISCONTINUED | OUTPATIENT
Start: 2019-02-07 | End: 2019-02-07 | Stop reason: HOSPADM

## 2019-02-07 RX ORDER — HEPARIN SODIUM,PORCINE 10 UNIT/ML
5 VIAL (ML) INTRAVENOUS
Status: CANCELLED | OUTPATIENT
Start: 2019-02-07

## 2019-02-07 RX ORDER — PLERIXAFOR 24 MG/1.2ML
0.24 SOLUTION SUBCUTANEOUS DAILY
Status: CANCELLED
Start: 2019-02-07

## 2019-02-07 RX ADMIN — FILGRASTIM 900 MCG: 300 INJECTION, SOLUTION INTRAVENOUS; SUBCUTANEOUS at 08:41

## 2019-02-07 RX ADMIN — Medication 5 ML: at 07:24

## 2019-02-07 RX ADMIN — Medication 5 ML: at 07:26

## 2019-02-07 ASSESSMENT — PAIN SCALES - GENERAL: PAINLEVEL: NO PAIN (0)

## 2019-02-07 NOTE — NURSING NOTE
"Oncology Rooming Note    February 7, 2019 7:44 AM   Angel Yanez is a 58 year old male who presents for:    Chief Complaint   Patient presents with     Diabetes     cvc, heparin locked, vitals checked     RECHECK     RTN- Multiple myeloma     Initial Vitals: /79   Pulse 111   Temp 97.9  F (36.6  C)   Resp 16   Wt 84.8 kg (187 lb)   SpO2 100%   BMI 27.70 kg/m   Estimated body mass index is 27.7 kg/m  as calculated from the following:    Height as of 2/5/19: 1.75 m (5' 8.9\").    Weight as of this encounter: 84.8 kg (187 lb). Body surface area is 2.03 meters squared.  No Pain (0) Comment: Data Unavailable   No LMP for male patient.  Allergies reviewed: Yes  Medications reviewed: Yes    Medications: Medication refills not needed today.  Pharmacy name entered into EPIC:    [facility] - [place], [place] - [address]  [facility] - [place], [place] - [address] [facility] [address]    Clinical concerns: none    7 minutes for nursing intake (face to face time)     Vickie Harris CMA              "

## 2019-02-08 ENCOUNTER — HOME INFUSION (PRE-WILLOW HOME INFUSION) (OUTPATIENT)
Dept: PHARMACY | Facility: CLINIC | Age: 59
End: 2019-02-08

## 2019-02-08 PROCEDURE — G0463 HOSPITAL OUTPT CLINIC VISIT: HCPCS

## 2019-02-08 PROCEDURE — 25000128 H RX IP 250 OP 636: Performed by: INTERNAL MEDICINE

## 2019-02-08 RX ORDER — HEPARIN SODIUM,PORCINE 10 UNIT/ML
5 VIAL (ML) INTRAVENOUS
Status: DISCONTINUED | OUTPATIENT
Start: 2019-02-08 | End: 2019-02-08 | Stop reason: HOSPADM

## 2019-02-08 RX ADMIN — Medication 5 ML: at 09:12

## 2019-02-08 RX ADMIN — Medication 5 ML: at 09:10

## 2019-02-08 NOTE — NURSING NOTE
Chief Complaint   Patient presents with     RECHECK     line flush, dressing change     Pt teaching- line care and dressing change reviewed with pt who verbalized understanding  Face to face time 5 min

## 2019-02-09 ENCOUNTER — HOME INFUSION (PRE-WILLOW HOME INFUSION) (OUTPATIENT)
Dept: PHARMACY | Facility: CLINIC | Age: 59
End: 2019-02-09

## 2019-02-10 ENCOUNTER — TELEPHONE (OUTPATIENT)
Dept: ONCOLOGY | Facility: CLINIC | Age: 59
End: 2019-02-10

## 2019-02-10 NOTE — TELEPHONE ENCOUNTER
Guille aYnez called to report 2 episodes of nausea, and 1 episode of diarrhea. He thinks he has stomach flu.  No fever, abdominal pain. His wife takes care of 2 kids and one of them had a stomach bug on Friday.   He is feeling okay now, no nausea, fever, chills, stomach pain.   Advised him to try small and frequent oral intake of liquids, broth, soup etc and if he is feeling okay to advance as tolerated.  Also advised him if his vomiting or diarrhea are intractable, having fever, abdominal pain, feeling dizzy or lightheaded, he may need to come to ED. Reassured me he does not have any other those symptoms, and he will call clinic in AM if he has any further problems by AM.     Favian Fernandez MD on 2/10/2019 at 3:10 PM  Heme Onc fellow.

## 2019-02-11 LAB — COPATH REPORT: NORMAL

## 2019-02-11 NOTE — PROGRESS NOTES
This is a recent snapshot of the patient's [facility] medical record.  For current drug dose and complete information and questions, call [phone] or In Basket pool, fv home infusion ([id number])  CSN Number:  [id number]

## 2019-02-12 LAB — COPATH REPORT: NORMAL

## 2019-02-13 ENCOUNTER — OFFICE VISIT (OUTPATIENT)
Dept: TRANSPLANT | Facility: CLINIC | Age: 59
End: 2019-02-13
Attending: NURSE PRACTITIONER
Payer: COMMERCIAL

## 2019-02-13 VITALS
TEMPERATURE: 98.8 F | SYSTOLIC BLOOD PRESSURE: 130 MMHG | WEIGHT: 185.6 LBS | HEART RATE: 74 BPM | OXYGEN SATURATION: 97 % | RESPIRATION RATE: 18 BRPM | BODY MASS INDEX: 27.49 KG/M2 | DIASTOLIC BLOOD PRESSURE: 82 MMHG

## 2019-02-13 DIAGNOSIS — C90.01 MULTIPLE MYELOMA IN REMISSION (H): Primary | ICD-10-CM

## 2019-02-13 LAB
BASOPHILS # BLD AUTO: 0 10E9/L (ref 0–0.2)
BASOPHILS NFR BLD AUTO: 0.7 %
DIFFERENTIAL METHOD BLD: ABNORMAL
EOSINOPHIL # BLD AUTO: 0 10E9/L (ref 0–0.7)
EOSINOPHIL NFR BLD AUTO: 0.4 %
ERYTHROCYTE [DISTWIDTH] IN BLOOD BY AUTOMATED COUNT: 13.7 % (ref 10–15)
HCT VFR BLD AUTO: 31.7 % (ref 40–53)
HGB BLD-MCNC: 10.6 G/DL (ref 13.3–17.7)
IMM GRANULOCYTES # BLD: 0 10E9/L (ref 0–0.4)
IMM GRANULOCYTES NFR BLD: 0.4 %
LYMPHOCYTES # BLD AUTO: 0.8 10E9/L (ref 0.8–5.3)
LYMPHOCYTES NFR BLD AUTO: 27.4 %
MCH RBC QN AUTO: 34.9 PG (ref 26.5–33)
MCHC RBC AUTO-ENTMCNC: 33.4 G/DL (ref 31.5–36.5)
MCV RBC AUTO: 104 FL (ref 78–100)
MONOCYTES # BLD AUTO: 0.7 10E9/L (ref 0–1.3)
MONOCYTES NFR BLD AUTO: 24.6 %
NEUTROPHILS # BLD AUTO: 1.3 10E9/L (ref 1.6–8.3)
NEUTROPHILS NFR BLD AUTO: 46.5 %
NRBC # BLD AUTO: 0 10*3/UL
NRBC BLD AUTO-RTO: 0 /100
PLATELET # BLD AUTO: 82 10E9/L (ref 150–450)
PLATELET # BLD EST: ABNORMAL 10*3/UL
RBC # BLD AUTO: 3.04 10E12/L (ref 4.4–5.9)
WBC # BLD AUTO: 2.8 10E9/L (ref 4–11)

## 2019-02-13 PROCEDURE — 88185 FLOWCYTOMETRY/TC ADD-ON: CPT | Performed by: STUDENT IN AN ORGANIZED HEALTH CARE EDUCATION/TRAINING PROGRAM

## 2019-02-13 PROCEDURE — 38222 DX BONE MARROW BX & ASPIR: CPT | Mod: ZF

## 2019-02-13 PROCEDURE — 88264 CHROMOSOME ANALYSIS 20-25: CPT | Performed by: STUDENT IN AN ORGANIZED HEALTH CARE EDUCATION/TRAINING PROGRAM

## 2019-02-13 PROCEDURE — 88341 IMHCHEM/IMCYTCHM EA ADD ANTB: CPT | Performed by: INTERNAL MEDICINE

## 2019-02-13 PROCEDURE — 88275 CYTOGENETICS 100-300: CPT | Performed by: STUDENT IN AN ORGANIZED HEALTH CARE EDUCATION/TRAINING PROGRAM

## 2019-02-13 PROCEDURE — 88184 FLOWCYTOMETRY/ TC 1 MARKER: CPT | Performed by: STUDENT IN AN ORGANIZED HEALTH CARE EDUCATION/TRAINING PROGRAM

## 2019-02-13 PROCEDURE — 40000951 ZZHCL STATISTIC BONE MARROW INTERP TC 85097: Performed by: INTERNAL MEDICINE

## 2019-02-13 PROCEDURE — 00000058 ZZHCL STATISTIC BONE MARROW ASP PERF TC 38220: Performed by: INTERNAL MEDICINE

## 2019-02-13 PROCEDURE — 88161 CYTOPATH SMEAR OTHER SOURCE: CPT | Performed by: INTERNAL MEDICINE

## 2019-02-13 PROCEDURE — 88305 TISSUE EXAM BY PATHOLOGIST: CPT | Performed by: INTERNAL MEDICINE

## 2019-02-13 PROCEDURE — 85025 COMPLETE CBC W/AUTO DIFF WBC: CPT | Performed by: NURSE PRACTITIONER

## 2019-02-13 PROCEDURE — 36416 COLLJ CAPILLARY BLOOD SPEC: CPT | Performed by: INTERNAL MEDICINE

## 2019-02-13 PROCEDURE — 88271 CYTOGENETICS DNA PROBE: CPT | Performed by: STUDENT IN AN ORGANIZED HEALTH CARE EDUCATION/TRAINING PROGRAM

## 2019-02-13 PROCEDURE — 88311 DECALCIFY TISSUE: CPT | Performed by: INTERNAL MEDICINE

## 2019-02-13 PROCEDURE — 88342 IMHCHEM/IMCYTCHM 1ST ANTB: CPT | Performed by: INTERNAL MEDICINE

## 2019-02-13 PROCEDURE — 00000161 ZZHCL STATISTIC H-SPHEME PROCESS B/S: Performed by: INTERNAL MEDICINE

## 2019-02-13 PROCEDURE — 40001003 ZZHCL STATISTIC FLOW INT 2-8 ABY TC 88187: Performed by: STUDENT IN AN ORGANIZED HEALTH CARE EDUCATION/TRAINING PROGRAM

## 2019-02-13 PROCEDURE — 40000611 ZZHCL STATISTIC MORPHOLOGY W/INTERP HEMEPATH TC 85060: Performed by: INTERNAL MEDICINE

## 2019-02-13 PROCEDURE — 88237 TISSUE CULTURE BONE MARROW: CPT | Performed by: STUDENT IN AN ORGANIZED HEALTH CARE EDUCATION/TRAINING PROGRAM

## 2019-02-13 PROCEDURE — 88280 CHROMOSOME KARYOTYPE STUDY: CPT | Performed by: STUDENT IN AN ORGANIZED HEALTH CARE EDUCATION/TRAINING PROGRAM

## 2019-02-13 PROCEDURE — 88261 CHROMOSOME ANALYSIS 5: CPT | Performed by: STUDENT IN AN ORGANIZED HEALTH CARE EDUCATION/TRAINING PROGRAM

## 2019-02-13 PROCEDURE — 25000128 H RX IP 250 OP 636: Mod: ZF | Performed by: NURSE PRACTITIONER

## 2019-02-13 PROCEDURE — 40000795 ZZHCL STATISTIC DNA PROCESS AND HOLD: Performed by: STUDENT IN AN ORGANIZED HEALTH CARE EDUCATION/TRAINING PROGRAM

## 2019-02-13 PROCEDURE — 40000424 ZZHCL STATISTIC BONE MARROW CORE PERF TC 38221: Performed by: INTERNAL MEDICINE

## 2019-02-13 RX ORDER — HEPARIN SODIUM,PORCINE 10 UNIT/ML
5 VIAL (ML) INTRAVENOUS
Status: DISCONTINUED | OUTPATIENT
Start: 2019-02-13 | End: 2019-05-27 | Stop reason: CLARIF

## 2019-02-13 RX ORDER — HEPARIN SODIUM (PORCINE) LOCK FLUSH IV SOLN 100 UNIT/ML 100 UNIT/ML
5 SOLUTION INTRAVENOUS EVERY 8 HOURS
Status: DISCONTINUED | OUTPATIENT
Start: 2019-02-13 | End: 2019-05-27 | Stop reason: CLARIF

## 2019-02-13 RX ADMIN — SODIUM CHLORIDE, PRESERVATIVE FREE 5 ML: 5 INJECTION INTRAVENOUS at 15:53

## 2019-02-13 RX ADMIN — SODIUM CHLORIDE, PRESERVATIVE FREE 5 ML: 5 INJECTION INTRAVENOUS at 15:54

## 2019-02-13 ASSESSMENT — PAIN SCALES - GENERAL
PAINLEVEL: NO PAIN (0)
PAINLEVEL: NO PAIN (0)

## 2019-02-13 NOTE — NURSING NOTE
BMT Teaching Flowsheet   Teaching Topic: post biopsy instructions    Person(s) involved in teaching: Patient  Motivation Level  Asks Questions: Yes  Eager to Learn: Yes  Cooperative: Yes  Receptive (willing/able to accept information): Yes    Patient demonstrates understanding of the following:   - Reason for the appointment, diagnosis and treatment plan: Yes  - Knowledge of proper use of medications and conditions for which they are ordered (with special attention to potential side effects or drug interactions): Yes  - Which situations necessitate calling provider and whom to contact: Yes    Teaching concerns addressed: reviewed activity restrictions if received premeds, potential for bleeding and actions to take if develops any of the issues below    Proper use and care of (medical equipment, care aids, etc.) Yes  Pain management techniques: Yes  Patient instructed on hand hygiene: Yes  How and/when to access community resources: Yes    Infection Control:  Patient demonstrates understanding of the following:   Surgical procedure site care taught NA  Signs and symptoms of infection taught Yes  Wound care taught Yes  Central venous catheter care taught Yes      Teaching concerns addressed: Bone marrow biopsy and infection prevention.      Instructional Materials Used/Given: Pt instructed to keep bmbx site clean and dry for 24hrs. Pt educated to monitor site for signs of infection such as redness, rash, oozing, puss, bleeding, pain, and elevated temp. Pt instructed to go to ER if any signs of infection should occur.  Pt and wife verbalize understanding.     Post procedure: Pt vss, ambulating, site is c,d,i. Pt d/c'd in stable condition.      Time spent with patient: 0 minutes.

## 2019-02-13 NOTE — PROGRESS NOTES
This is a recent snapshot of the patient's [facility] medical record.  For current drug dose and complete information and questions, call [phone] or In Basket pool, fv home infusion ([id number])  CSN Number:  [id number]

## 2019-02-13 NOTE — NURSING NOTE
Dressing change completed sterile technique used. Site WDL.  Patient tolerated dressing change. See Flowsheet.     Kathy Perez MA

## 2019-02-13 NOTE — PROGRESS NOTES
BMT ONC Adult Bone Marrow Biopsy Procedure Note  February 13, 2019  /82 (BP Location: Right arm, Patient Position: Sitting)   Pulse 87   Temp 98.6  F (37  C) (Oral)   Resp 18   Wt 84.2 kg (185 lb 9.6 oz)   SpO2 96%   BMI 27.49 kg/m       Learning needs assessment complete within 12 months? YES    DIAGNOSIS: Multiple myeloma     PROCEDURE: Unilateral Bone Marrow Biopsy and Unilateral Aspirate    [place] [facility] [place] [facility] [place]    Patient s identification was positively verified by verbal identification and invasive procedure safety checklist was completed. Informed consent was obtained. Patient was placed in the prone position and prepped and draped in a sterile manner. Approximately 15 cc of 1% Lidocaine was used over the right posterior iliac spine. Following this a 3 mm incision was made. Trephine bone marrow core(s) was (were) obtained from the [facility]. Bone marrow aspirates were obtained from the [facility]. Aspirates were sent for morphology, immunophenotyping, cytogenetics and molecular diagnostics RFLP. A total of approximately 20 ml of marrow was aspirated. Following this procedure a sterile dressing was applied to the bone marrow biopsy site(s). The patient was placed in the supine position to maintain pressure on the biopsy site. Post-procedure wound care instructions were given.     Complications: NO    Pre-procedural pain: 0 out of 10 on the numeric pain rating scale.     Procedural pain: 5 out of 10 on the numeric pain rating scale.     Post-procedural pain assessment: 0 out of 10 on the numeric pain rating scale.     Interventions: NO    Length of procedure:20 minutes or less      Procedure performed by: Mony Pitts PAC  [phone]

## 2019-02-13 NOTE — NURSING NOTE
"Oncology Rooming Note    February 13, 2019 3:35 PM   Angel Yanez is a 58 year old male who presents for:    Chief Complaint   Patient presents with     RECHECK     MM here for bmbx     Initial Vitals: /82 (BP Location: Right arm, Patient Position: Sitting)   Pulse 87   Temp 98.6  F (37  C) (Oral)   Resp 18   Wt 84.2 kg (185 lb 9.6 oz)   SpO2 96%   BMI 27.49 kg/m   Estimated body mass index is 27.49 kg/m  as calculated from the following:    Height as of 2/5/19: 1.75 m (5' 8.9\").    Weight as of this encounter: 84.2 kg (185 lb 9.6 oz). Body surface area is 2.02 meters squared.  No Pain (0) Comment: Data Unavailable   No LMP for male patient.  Allergies reviewed: Yes  Medications reviewed: Yes    Medications: Medication refills not needed today.  Pharmacy name entered into EPIC:    [facility] - [place] - [address] [facility]ARMACY [facility] - [place], [place] - [address] [facility] [address]    Clinical concerns: None    0 minutes for nursing intake (face to face time)     Tr Jj RN                "

## 2019-02-14 LAB
COPATH REPORT: NORMAL
COPATH REPORT: NORMAL

## 2019-02-15 ENCOUNTER — ONCOLOGY VISIT (OUTPATIENT)
Dept: TRANSPLANT | Facility: CLINIC | Age: 59
End: 2019-02-15
Attending: INTERNAL MEDICINE
Payer: COMMERCIAL

## 2019-02-15 ENCOUNTER — HOSPITAL ENCOUNTER (OUTPATIENT)
Facility: CLINIC | Age: 59
End: 2019-02-15
Attending: INTERNAL MEDICINE | Admitting: INTERNAL MEDICINE
Payer: COMMERCIAL

## 2019-02-15 VITALS
HEIGHT: 69 IN | HEART RATE: 90 BPM | SYSTOLIC BLOOD PRESSURE: 116 MMHG | DIASTOLIC BLOOD PRESSURE: 71 MMHG | OXYGEN SATURATION: 96 % | RESPIRATION RATE: 16 BRPM | TEMPERATURE: 98.8 F | WEIGHT: 185.3 LBS | BODY MASS INDEX: 27.44 KG/M2

## 2019-02-15 DIAGNOSIS — C85.90 NH LYMPHOMA (H): Primary | ICD-10-CM

## 2019-02-15 DIAGNOSIS — C90.00 MULTIPLE MYELOMA NOT HAVING ACHIEVED REMISSION (H): Primary | ICD-10-CM

## 2019-02-15 DIAGNOSIS — C90.00 MULTIPLE MYELOMA (H): ICD-10-CM

## 2019-02-15 DIAGNOSIS — C90.01 MULTIPLE MYELOMA IN REMISSION (H): ICD-10-CM

## 2019-02-15 LAB
ABO + RH BLD: NORMAL
ABO + RH BLD: NORMAL
BASOPHILS # BLD AUTO: 0 10E9/L (ref 0–0.2)
BASOPHILS NFR BLD AUTO: 0.4 %
BLD GP AB SCN SERPL QL: NORMAL
BLOOD BANK CMNT PATIENT-IMP: NORMAL
COPATH REPORT: NORMAL
DIFFERENTIAL METHOD BLD: ABNORMAL
EOSINOPHIL # BLD AUTO: 0 10E9/L (ref 0–0.7)
EOSINOPHIL NFR BLD AUTO: 0.4 %
ERYTHROCYTE [DISTWIDTH] IN BLOOD BY AUTOMATED COUNT: 14.2 % (ref 10–15)
HCT VFR BLD AUTO: 30.3 % (ref 40–53)
HGB BLD-MCNC: 10.2 G/DL (ref 13.3–17.7)
IMM GRANULOCYTES # BLD: 0 10E9/L (ref 0–0.4)
IMM GRANULOCYTES NFR BLD: 0.4 %
INR PPP: 1.04 (ref 0.86–1.14)
LYMPHOCYTES # BLD AUTO: 0.8 10E9/L (ref 0.8–5.3)
LYMPHOCYTES NFR BLD AUTO: 30.7 %
MCH RBC QN AUTO: 34.9 PG (ref 26.5–33)
MCHC RBC AUTO-ENTMCNC: 33.7 G/DL (ref 31.5–36.5)
MCV RBC AUTO: 104 FL (ref 78–100)
MONOCYTES # BLD AUTO: 0.5 10E9/L (ref 0–1.3)
MONOCYTES NFR BLD AUTO: 19.5 %
NEUTROPHILS # BLD AUTO: 1.3 10E9/L (ref 1.6–8.3)
NEUTROPHILS NFR BLD AUTO: 48.6 %
NRBC # BLD AUTO: 0 10*3/UL
NRBC BLD AUTO-RTO: 0 /100
PLATELET # BLD AUTO: 108 10E9/L (ref 150–450)
RBC # BLD AUTO: 2.92 10E12/L (ref 4.4–5.9)
SPECIMEN EXP DATE BLD: NORMAL
WBC # BLD AUTO: 2.6 10E9/L (ref 4–11)

## 2019-02-15 PROCEDURE — 36592 COLLECT BLOOD FROM PICC: CPT

## 2019-02-15 PROCEDURE — 86900 BLOOD TYPING SEROLOGIC ABO: CPT | Performed by: INTERNAL MEDICINE

## 2019-02-15 PROCEDURE — 85025 COMPLETE CBC W/AUTO DIFF WBC: CPT | Performed by: INTERNAL MEDICINE

## 2019-02-15 PROCEDURE — 86901 BLOOD TYPING SEROLOGIC RH(D): CPT | Performed by: INTERNAL MEDICINE

## 2019-02-15 PROCEDURE — 85610 PROTHROMBIN TIME: CPT | Performed by: INTERNAL MEDICINE

## 2019-02-15 PROCEDURE — 86850 RBC ANTIBODY SCREEN: CPT | Performed by: INTERNAL MEDICINE

## 2019-02-15 PROCEDURE — G0463 HOSPITAL OUTPT CLINIC VISIT: HCPCS | Mod: ZF

## 2019-02-15 PROCEDURE — 25000128 H RX IP 250 OP 636: Mod: ZF | Performed by: INTERNAL MEDICINE

## 2019-02-15 RX ORDER — HEPARIN SODIUM,PORCINE 10 UNIT/ML
5 VIAL (ML) INTRAVENOUS
Status: DISCONTINUED | OUTPATIENT
Start: 2019-02-15 | End: 2019-02-15 | Stop reason: HOSPADM

## 2019-02-15 RX ADMIN — Medication 5 ML: at 16:00

## 2019-02-15 RX ADMIN — Medication 5 ML: at 15:59

## 2019-02-15 ASSESSMENT — PAIN SCALES - GENERAL: PAINLEVEL: NO PAIN (0)

## 2019-02-15 ASSESSMENT — MIFFLIN-ST. JEOR: SCORE: 1639.31

## 2019-02-15 NOTE — NURSING NOTE
Chief Complaint   Patient presents with     Labs Only     cvc, heparin locked, vitals checked     Eloisa King RN on 2/15/2019 at 4:02 PM

## 2019-02-16 NOTE — PROGRESS NOTES
"BMT Clinic Note      Angel Yanez is a 58 year old male with IgA Kappa MM - s/p cytoxan chemo-priming prior stem cell collections for 2nd autologous pbsct.      HPI:   Angel reports doing fine.  Understands plan for GM-CSF and bone marrow harvest.  He denies any active symptoms including fevers, shortness of breath, pain.      Review of Systems: 10 point ROS negative except as noted above.     Physical Exam:   /71 (BP Location: Right arm, Patient Position: Sitting, Cuff Size: Adult Regular)   Pulse 90   Temp 98.8  F (37.1  C) (Oral)   Resp 16   Ht 1.75 m (5' 8.9\")   Wt 84.1 kg (185 lb 4.8 oz)   SpO2 96%   BMI 27.44 kg/m    General: A&O, pleasant, NAD   Eyes: FARRAH, sclera anicteric   Nose/Mouth/Throat: OP clear, buccal mucosa moist, no ulcerations.   Lungs: CTA bilaterally  Cardiovascular: RRR, no M/R/G   Lymphatics: no edema  Skin: No rash.  Neuro: A&O   Additional Findings: Hsieh site NT, no drainage    Labs:  Lab Results   Component Value Date    WBC 2.6 (L) 02/15/2019    ANEU 1.3 (L) 02/15/2019    HGB 10.2 (L) 02/15/2019    HCT 30.3 (L) 02/15/2019     (L) 02/15/2019     02/07/2019    POTASSIUM 3.9 02/07/2019    CHLORIDE 110 (H) 02/07/2019    CO2 24 02/07/2019     (H) 02/07/2019    BUN 14 02/07/2019    CR 0.94 02/07/2019    MAG 2.2 01/24/2019    INR 1.04 02/15/2019    AST 25 01/28/2019    ALT 32 01/28/2019       Assessment and Plan:  58 year old male with IgA Kappa MM - s/p cytoxan chemo-priming prior stem cell collections for 2nd autologous pbsct.       1.  BMT:  Day +17   - s/p chemopriming prior to stem cell collections for 2nd Autologous PBSCT for MM (first tx 2005 - did not collect for 2 transplants at that time). Repeating Auto given long disease free duration (no detectable disease 2852-5761)  1/22/19: Cytoxan   - 900mcg GCSF started 72 hr post cytoxan (1/26/19) and continue through stem cell collections. Goal 5x10^6 cd34/kg.  Failed stem cell mobilization with G-CSF " and Plerexifor.   -Bone marrow biopsy in 2/13 shows marrow cellularity of 50%, persistent myeloma at 10% abnormal plasma cells.  With adequate cellularity, we will proceed to plan for GM-CSF times 5 days followed by bone marrow harvest.     Plan:  2/18-2/22 GM CSF  2/21 Pre op  2/22 [place]   2/23-2/24 home  2/25- likely admission for transplant     2.  HEME: Keep Hgb>8 and plts>10K. No pre-meds.     3.  ID:   - Prophy: Continue LD acyclovir             4.  GI:   Continue  Protonix for GI prophy (was on this at baseline for GERD).      5.  HTN:   - HTN: lisinopril 10mg daily.   - Hx of hyperlipidemia, not currently on tx.     6.  Derm:   - itching, benadryl at night prn.   - psoriasis, h/o pred use; ok to use topical triamcinolone         RTC on Monday    Ayah Corwley MD   of Medicine  Hematology, Oncology and Transplantation   Pager: [phone]

## 2019-02-17 ENCOUNTER — OFFICE VISIT (OUTPATIENT)
Dept: TRANSPLANT | Facility: CLINIC | Age: 59
End: 2019-02-17
Attending: INTERNAL MEDICINE
Payer: COMMERCIAL

## 2019-02-17 ENCOUNTER — TELEPHONE (OUTPATIENT)
Dept: TRANSPLANT | Facility: CLINIC | Age: 59
End: 2019-02-17

## 2019-02-17 DIAGNOSIS — Z48.00 CHANGE OF DRESSING: Primary | ICD-10-CM

## 2019-02-17 NOTE — PROGRESS NOTES
Chief Complaint   Patient presents with     Dressing Change     Patient with Mutiple Myeloma here for a dressing change       Sterile technique was used to change patient's Davol dressing.  A new dressing was applied.  Patient tolerated procedure with no incident.     5 minutes for nursing intake (face to face time)

## 2019-02-17 NOTE — TELEPHONE ENCOUNTER
Called Angel on the phone to re-discuss plan for his autologous stem cell transplantation. After reviewing bone marrow biopsy results and discussion with primary BMT physician Dr. Lucio, due to his 10% plasma cells in the marrow, we want to postpone bone marrow harvest and ASCT and pursue further myeloma therapy. There is concern for increased risk of relapse with reinfusion of bone marrow with myeloma involvement. Ideally myeloma should be <3% in bone marrow for harvest. His poor mobilization may be due to multiple cycles of RVD that he received, but also waiting for cytogenetics and FISH to eval for new development of MDS changes. I discussed that a Daratumomab containing regimen would be recommended, such as Hiral-pom-dex. I will call Dr. Collado tomorrow to let her know our recommendations for 2 months of Hiral-Pom-Dex or Hiral-Velcade-Dex, followed by repeat BM Bx (best to be done here at [facility]). At that point, if BM myeloma burden is at a minimal level, then would proceed with bone marrow harvest and autologous stem cell transplantation. Angel is disappointed that transplant will be delayed, but understanding of our rationale and plan. Appointments for this week will be cancelled.       Ayah Crowley MD   of Medicine  Hematology, Oncology and Transplantation   Pager: [phone]

## 2019-02-18 DIAGNOSIS — C90.00 MULTIPLE MYELOMA (H): Primary | ICD-10-CM

## 2019-02-20 ENCOUNTER — ANCILLARY PROCEDURE (OUTPATIENT)
Dept: ULTRASOUND IMAGING | Facility: CLINIC | Age: 59
End: 2019-02-20
Attending: INTERNAL MEDICINE
Payer: COMMERCIAL

## 2019-02-20 DIAGNOSIS — C90.00 MULTIPLE MYELOMA (H): ICD-10-CM

## 2019-02-20 RX ORDER — LIDOCAINE HYDROCHLORIDE 10 MG/ML
30 INJECTION, SOLUTION EPIDURAL; INFILTRATION; INTRACAUDAL; PERINEURAL ONCE
Status: COMPLETED | OUTPATIENT
Start: 2019-02-20 | End: 2019-02-20

## 2019-02-20 RX ADMIN — LIDOCAINE HYDROCHLORIDE 5 ML: 10 INJECTION, SOLUTION EPIDURAL; INFILTRATION; INTRACAUDAL; PERINEURAL at 09:50

## 2019-02-20 NOTE — DISCHARGE INSTRUCTIONS
A collaboration between [facility] and [facility]  Experts in minimally invasive, targeted treatments performed using imaging guidance    Venous Access Device, Port Catheter or Tunneled Central Line Removal    Today you had your existing venous access device removed, either because it was no longer needed or because there was malfunction or infection issues.    One of our Radiology PAs performed this procedure for you today:  ? Eloy Tsai, [facility], PAAshokC  ? ALE Mcintosh, PA-C  ? Aj Mancera PA-C  ? Brea Bravo MS, PA-C  ? Horacio Garrido PA-C    After you go home:  - Drink plenty of fluids.  Generally 6-8 (8 ounce) glasses a day is recommended.  - Resume your regular diet unless otherwise ordered by a medical provider.  - Keep any applied tape/gauze dressings clean and dry.  Change tape/gauze dressings if they get wet or soiled.  - You may shower the following day after procedure, however cover and protect from moisture any tape/gauze dressings.  You may let water hit and run over dried skin glue, but do not scrub.  Pat the area dry after showering.  - Port removal incisions are closed with absorbable suture, meaning they do not need to be removed at a later date, and a topical skin adhesive (skin glue).  This glue will wear off in 7-14 days.  Do not remove before this time.  If 14 days have passed and residual glue is present, you may gently remove it.  - You may remove tape/gauze dressings after 5 days if the site looks closed and in the process of healing.  - Do not apply gels, lotions, or ointments to the glue site for the first 10 days as this may cause the glue to prematurely soften and fail.  - Do not perform strenuous activities or lift greater than 10 pounds for the next three days.  - If there is bleeding or oozing from the procedure site, apply firm pressure to the area for 5-10 minutes.  If the bleeding continues seek medical advice at the  numbers below.  - Mild procedure site discomfort can be treated with an ice pack and over-the-counter pain relievers.              For 24 hours after any sedation used:  - Relax and take it easy.  No strenuous activities.  - Do not drive or operate machines at home or at work.  - No alcohol consumption.  - Do not make any important or legal decisions.    Call our Interventional Radiology (IR) service if:  - If you start bleeding from the procedure site.  If you do start to bleed from the site, lie down and hold some pressure on the site.  Our radiology provider can help you decide if you need to return to the hospital.  - If you have new or worsening pain related to the procedure.  - If you have concerning swelling at the procedure site.  - If you develop persistent nausea or vomiting.  - If you develop hives or a rash or any unexplained itching.  - If you have a fever of greater than 100.5  F and chills in the first 5 days after procedure.  - Any other concerns related to your procedure.      [facility]  Interventional Radiology (IR)  [address] [place] [address]    Contact Number:  [phone]  (IR control desk)  - Monday - Friday 8:00 am - 4:30 pm    After hours for urgent concerns:  [phone]  - After 4:30 pm Monday - Friday, Weekends and Holidays.   - Ask for Interventional Radiology on-call.  Someone is available 24 hours a day.  - [facility] toll free number:  [phone]

## 2019-02-20 NOTE — PROGRESS NOTES
Interventional Radiology Brief Post Procedure Note    Procedure: Tunneled central venous catheter removal.    Proceduralist: Eloy Tsai [facility] K[facility]    Assistant: None    Time Out: Prior to the start of the procedure and with procedural staff participation, I verbally confirmed the patient s identity using two indicators, relevant allergies, that the procedure was appropriate and matched the consent or emergent situation, and that the correct equipment/implants were available. Immediately prior to starting the procedure I conducted the Time Out with the procedural staff and re-confirmed the patient s name, procedure, and site/side. (The Joint Commission universal protocol was followed.)  Yes    Medications   Medication Event Details Admin User Admin Time       Sedation: None. Local Anesthestic used    Findings: Existing right internal jugular, 14.5 Fr. Tunneled central venous catheter removed intact and without difficulty.    Estimated Blood Loss: Minimal    Fluoroscopy Time:  None.    SPECIMENS: None    Complications: 1. None     Condition: Stable    Plan: Follow up per primary team. Upright for 2 hours, no strenuous activity for 3 days.    Comments: See dictated procedure note for full details.    Eloy Tsai PA-C

## 2019-03-08 ENCOUNTER — HOME INFUSION (PRE-WILLOW HOME INFUSION) (OUTPATIENT)
Dept: PHARMACY | Facility: CLINIC | Age: 59
End: 2019-03-08

## 2019-03-12 NOTE — PROGRESS NOTES
This is a recent snapshot of the patient's [facility] medical record.  For current drug dose and complete information and questions, call [phone] or In Basket pool, [facility] home infusion ([id number])  CSN Number:  [id number]

## 2019-03-15 LAB — COPATH REPORT: NORMAL

## 2019-03-21 LAB — COPATH REPORT: NORMAL

## 2019-04-01 DIAGNOSIS — C90.00 MULTIPLE MYELOMA (H): Primary | ICD-10-CM

## 2019-04-25 ENCOUNTER — OFFICE VISIT (OUTPATIENT)
Dept: TRANSPLANT | Facility: CLINIC | Age: 59
End: 2019-04-25
Attending: NURSE PRACTITIONER
Payer: COMMERCIAL

## 2019-04-25 VITALS
OXYGEN SATURATION: 96 % | DIASTOLIC BLOOD PRESSURE: 91 MMHG | SYSTOLIC BLOOD PRESSURE: 147 MMHG | TEMPERATURE: 97.7 F | WEIGHT: 184.8 LBS | RESPIRATION RATE: 16 BRPM | BODY MASS INDEX: 27.37 KG/M2 | HEART RATE: 87 BPM

## 2019-04-25 DIAGNOSIS — C90.00 MULTIPLE MYELOMA, REMISSION STATUS UNSPECIFIED (H): Primary | ICD-10-CM

## 2019-04-25 LAB
ALBUMIN SERPL-MCNC: 3.7 G/DL (ref 3.4–5)
ALP SERPL-CCNC: 83 U/L (ref 40–150)
ALT SERPL W P-5'-P-CCNC: 27 U/L (ref 0–70)
ANION GAP SERPL CALCULATED.3IONS-SCNC: 5 MMOL/L (ref 3–14)
AST SERPL W P-5'-P-CCNC: 22 U/L (ref 0–45)
BASOPHILS # BLD AUTO: 0 10E9/L (ref 0–0.2)
BASOPHILS NFR BLD AUTO: 0.3 %
BILIRUB SERPL-MCNC: 0.5 MG/DL (ref 0.2–1.3)
BUN SERPL-MCNC: 13 MG/DL (ref 7–30)
CALCIUM SERPL-MCNC: 9.1 MG/DL (ref 8.5–10.1)
CHLORIDE SERPL-SCNC: 107 MMOL/L (ref 94–109)
CO2 SERPL-SCNC: 26 MMOL/L (ref 20–32)
CREAT SERPL-MCNC: 1.05 MG/DL (ref 0.66–1.25)
DIFFERENTIAL METHOD BLD: ABNORMAL
EOSINOPHIL # BLD AUTO: 0 10E9/L (ref 0–0.7)
EOSINOPHIL NFR BLD AUTO: 0.7 %
ERYTHROCYTE [DISTWIDTH] IN BLOOD BY AUTOMATED COUNT: 12.5 % (ref 10–15)
GFR SERPL CREATININE-BSD FRML MDRD: 76 ML/MIN/{1.73_M2}
GLUCOSE SERPL-MCNC: 73 MG/DL (ref 70–99)
HCT VFR BLD AUTO: 41 % (ref 40–53)
HGB BLD-MCNC: 13.8 G/DL (ref 13.3–17.7)
IMM GRANULOCYTES # BLD: 0 10E9/L (ref 0–0.4)
IMM GRANULOCYTES NFR BLD: 0 %
INR PPP: 0.91 (ref 0.86–1.14)
LYMPHOCYTES # BLD AUTO: 1.1 10E9/L (ref 0.8–5.3)
LYMPHOCYTES NFR BLD AUTO: 35.2 %
MCH RBC QN AUTO: 34.8 PG (ref 26.5–33)
MCHC RBC AUTO-ENTMCNC: 33.7 G/DL (ref 31.5–36.5)
MCV RBC AUTO: 103 FL (ref 78–100)
MONOCYTES # BLD AUTO: 0.5 10E9/L (ref 0–1.3)
MONOCYTES NFR BLD AUTO: 16.4 %
NEUTROPHILS # BLD AUTO: 1.4 10E9/L (ref 1.6–8.3)
NEUTROPHILS NFR BLD AUTO: 47.4 %
NRBC # BLD AUTO: 0 10*3/UL
NRBC BLD AUTO-RTO: 0 /100
PLATELET # BLD AUTO: 74 10E9/L (ref 150–450)
POTASSIUM SERPL-SCNC: 3.8 MMOL/L (ref 3.4–5.3)
PROT SERPL-MCNC: 6.8 G/DL (ref 6.8–8.8)
RBC # BLD AUTO: 3.97 10E12/L (ref 4.4–5.9)
SODIUM SERPL-SCNC: 138 MMOL/L (ref 133–144)
WBC # BLD AUTO: 3 10E9/L (ref 4–11)

## 2019-04-25 PROCEDURE — 88275 CYTOGENETICS 100-300: CPT | Performed by: INTERNAL MEDICINE

## 2019-04-25 PROCEDURE — 88237 TISSUE CULTURE BONE MARROW: CPT | Performed by: INTERNAL MEDICINE

## 2019-04-25 PROCEDURE — 88271 CYTOGENETICS DNA PROBE: CPT | Performed by: INTERNAL MEDICINE

## 2019-04-25 PROCEDURE — 40000951 ZZHCL STATISTIC BONE MARROW INTERP TC 85097: Performed by: INTERNAL MEDICINE

## 2019-04-25 PROCEDURE — 88305 TISSUE EXAM BY PATHOLOGIST: CPT | Performed by: INTERNAL MEDICINE

## 2019-04-25 PROCEDURE — 88341 IMHCHEM/IMCYTCHM EA ADD ANTB: CPT | Performed by: INTERNAL MEDICINE

## 2019-04-25 PROCEDURE — 88161 CYTOPATH SMEAR OTHER SOURCE: CPT | Performed by: INTERNAL MEDICINE

## 2019-04-25 PROCEDURE — 40001004 ZZHCL STATISTIC FLOW INT 9-15 ABY TC 88188: Performed by: INTERNAL MEDICINE

## 2019-04-25 PROCEDURE — 88184 FLOWCYTOMETRY/ TC 1 MARKER: CPT | Performed by: INTERNAL MEDICINE

## 2019-04-25 PROCEDURE — 88185 FLOWCYTOMETRY/TC ADD-ON: CPT | Performed by: INTERNAL MEDICINE

## 2019-04-25 PROCEDURE — 85025 COMPLETE CBC W/AUTO DIFF WBC: CPT | Performed by: INTERNAL MEDICINE

## 2019-04-25 PROCEDURE — 36416 COLLJ CAPILLARY BLOOD SPEC: CPT | Performed by: INTERNAL MEDICINE

## 2019-04-25 PROCEDURE — 81261 IGH GENE REARRANGE AMP METH: CPT | Performed by: INTERNAL MEDICINE

## 2019-04-25 PROCEDURE — 88280 CHROMOSOME KARYOTYPE STUDY: CPT | Performed by: INTERNAL MEDICINE

## 2019-04-25 PROCEDURE — 85610 PROTHROMBIN TIME: CPT | Performed by: INTERNAL MEDICINE

## 2019-04-25 PROCEDURE — 88342 IMHCHEM/IMCYTCHM 1ST ANTB: CPT | Performed by: INTERNAL MEDICINE

## 2019-04-25 PROCEDURE — 38222 DX BONE MARROW BX & ASPIR: CPT | Mod: ZF

## 2019-04-25 PROCEDURE — 00000161 ZZHCL STATISTIC H-SPHEME PROCESS B/S: Performed by: INTERNAL MEDICINE

## 2019-04-25 PROCEDURE — 80053 COMPREHEN METABOLIC PANEL: CPT | Performed by: INTERNAL MEDICINE

## 2019-04-25 PROCEDURE — 40000611 ZZHCL STATISTIC MORPHOLOGY W/INTERP HEMEPATH TC 85060: Performed by: INTERNAL MEDICINE

## 2019-04-25 PROCEDURE — 88311 DECALCIFY TISSUE: CPT | Performed by: INTERNAL MEDICINE

## 2019-04-25 PROCEDURE — 00000058 ZZHCL STATISTIC BONE MARROW ASP PERF TC 38220: Performed by: INTERNAL MEDICINE

## 2019-04-25 PROCEDURE — 40000424 ZZHCL STATISTIC BONE MARROW CORE PERF TC 38221: Performed by: INTERNAL MEDICINE

## 2019-04-25 PROCEDURE — 88264 CHROMOSOME ANALYSIS 20-25: CPT | Performed by: INTERNAL MEDICINE

## 2019-04-25 RX ORDER — PAROXETINE 20 MG/1
20 TABLET, FILM COATED ORAL EVERY MORNING
COMMUNITY
End: 2019-12-02

## 2019-04-25 ASSESSMENT — PAIN SCALES - GENERAL: PAINLEVEL: NO PAIN (0)

## 2019-04-25 NOTE — PROCEDURES
BMT ONC Adult Bone Marrow Biopsy Procedure Note  April 25, 2019  BP (!) 160/97   Pulse 83   Temp 97.7  F (36.5  C) (Oral)   Resp 16   Wt 83.8 kg (184 lb 12.8 oz)   SpO2 98%   BMI 27.37 kg/m       Learning needs assessment complete within 12 months? YES    DIAGNOSIS: MM     PROCEDURE: Unilateral Bone Marrow Biopsy and Unilateral Aspirate    LOCATION: [facility] 2nd Floor    Patient s identification was positively verified by verbal identification and invasive procedure safety checklist was completed. Informed consent was obtained. The patient was placed in the left lateral decubitus position and prepped and draped in a sterile manner. Approximately 10 cc of 1% Lidocaine was used over the left posterior iliac spine. Following this a 3 mm incision was made. Trephine bone marrow core(s) was (were) obtained from the [facility]. Bone marrow aspirates were obtained from the [facility]. Aspirates were sent for morphology, immunophenotyping, cytogenetics and molecular diagnostics . A total of approximately 20 ml of marrow was aspirated. Following this procedure a sterile dressing was applied to the bone marrow biopsy site(s). The patient was placed in the supine position to maintain pressure on the biopsy site. Post-procedure wound care instructions were given.     Complications: NO    Pre-procedural pain: 0 out of 10 on the numeric pain rating scale.     Procedural pain: 4 out of 10 on the numeric pain rating scale.     Post-procedural pain assessment: 0 out of 10 on the numeric pain rating scale.     Interventions: NO    Length of procedure:20 minutes or less      Procedure performed by: Karla Manzo

## 2019-04-25 NOTE — NURSING NOTE
"Chief Complaint   Patient presents with     RECHECK     Bmbx, MM     Oncology Rooming Note    April 25, 2019 9:07 AM   Angel Yanez is a 58 year old male who presents for:    Chief Complaint   Patient presents with     RECHECK     Bmbx, MM     Initial Vitals: BP (!) 160/97   Pulse 83   Temp 97.7  F (36.5  C) (Oral)   Resp 16   Wt 83.8 kg (184 lb 12.8 oz)   SpO2 98%   BMI 27.37 kg/m   Estimated body mass index is 27.37 kg/m  as calculated from the following:    Height as of 2/15/19: 1.75 m (5' 8.9\").    Weight as of this encounter: 83.8 kg (184 lb 12.8 oz). Body surface area is 2.02 meters squared.  No Pain (0) Comment: Data Unavailable   No LMP for male patient.  Allergies reviewed: Yes  Medications reviewed: Yes    Medications: Medication refills not needed today.  Pharmacy name entered into EPIC:    [facility] - [place], [place] - [address] PHARMACY [facility] [place], [place] - [address] [facility]    Clinical concerns: None     Rosana Brown, RN              "

## 2019-04-26 LAB
COPATH REPORT: NORMAL
COPATH REPORT: NORMAL

## 2019-04-29 NOTE — PROGRESS NOTES
[facility] BMT [facility]    HPI and Interval History: 58-year-old man with multiple myeloma, s/p Owen/Dex, auto 2005, relapse 2018, s/p VRD with LA, then Hiral/Tip/Dex with minimal residual disease on recent marrow, now here for BM harvest for second auto.  He has noticed a mild flareup of his psoriatic rash on the lower back which is mildly pruritic but has not required any therapy yet.  He has been off oral methotrexate for over a year now. 10-point ROS otherwise negative.      Lab Results   Component Value Date    WBC 3.0 (L) 04/25/2019    HGB 13.8 04/25/2019    HCT 41.0 04/25/2019    PLT 74 (L) 04/25/2019     04/25/2019    POTASSIUM 3.8 04/25/2019    CHLORIDE 107 04/25/2019    CO2 26 04/25/2019    BUN 13 04/25/2019    CR 1.05 04/25/2019    GLC 73 04/25/2019    AST 22 04/25/2019    ALT 27 04/25/2019    ALKPHOS 83 04/25/2019    BILITOTAL 0.5 04/25/2019    INR 0.91 04/25/2019        Current Outpatient Medications   Medication     acetaminophen (TYLENOL) 325 MG tablet     acyclovir (ZOVIRAX) 400 MG tablet     amoxicillin (AMOXIL) 500 MG capsule     calcipotriene (DOVONOX) 0.005 % SOLN solution     calcium carbonate (CALCIUM CARBONATE) 600 MG tablet     Cholecalciferol (VITAMIN D3) 2000 units CAPS     lisinopril (PRINIVIL/ZESTRIL) 10 MG tablet     loratadine (CLARITIN) 10 MG tablet     omeprazole (PRILOSEC OTC) 20 MG tablet     PARoxetine (PAXIL) 20 MG tablet     prednisoLONE acetate (PRED FORTE) 1 % ophthalmic susp     No current facility-administered medications for this visit.      Facility-Administered Medications Ordered in Other Visits   Medication     heparin 100 UNIT/ML injection 5 mL     heparin lock flush 10 UNIT/ML injection 5 mL     Ph/E:   BP (!) 150/99   Pulse 76   Temp 97.4  F (36.3  C) (Oral)   Resp 18   Wt 83.5 kg (184 lb)   SpO2 98%   BMI 27.25 kg/m    General: NAD; H&N: no mucosal lesions; Lungs clear; Heart RRR; Abdomen; Soft, No organomegaly; Extremities: No edema; Skin: There  is an area of 10 x 5 cm pink patch on his lower back with some scaling but no open lesions; Neuro: Nonfocal; Mood/Affect: appropriate    A&P:   1.  Relapsed myeloma, IgA Kappa and s/p 1 auto, marrow with tiny amount of myelopma and 10-20% cellularity, ready to proceed with harvest for second auto.  He will receive GM-CSF before the harvest.  We discussed that there is still a chance that we will not be able to collect enough stem cells for a safe second transplant but there is also a decent chance that this could happen.  The side effects of second transplant were discussed including mouth sores, infection, bleeding, other organ toxicity, complications of collection, nausea, vomiting, diarrhea, and death.  We discussed that because his first transplant was so successful with about a decade of remission the second attempt for transplant will make a lot of sense.  It would be nice to keep him on some maintenance after transplant to further prolong his remission because the second transplant tends to have about half of the remission length as the first one. His psoriasis (mildly flaring on the back) may go into remission with this procedure. I notified the BMT office.

## 2019-04-30 ENCOUNTER — ONCOLOGY VISIT (OUTPATIENT)
Dept: TRANSPLANT | Facility: CLINIC | Age: 59
End: 2019-04-30
Attending: INTERNAL MEDICINE
Payer: COMMERCIAL

## 2019-04-30 VITALS
TEMPERATURE: 97.4 F | WEIGHT: 184 LBS | SYSTOLIC BLOOD PRESSURE: 150 MMHG | OXYGEN SATURATION: 98 % | BODY MASS INDEX: 27.25 KG/M2 | HEART RATE: 76 BPM | RESPIRATION RATE: 18 BRPM | DIASTOLIC BLOOD PRESSURE: 99 MMHG

## 2019-04-30 DIAGNOSIS — C90.01 MULTIPLE MYELOMA IN REMISSION (H): Primary | ICD-10-CM

## 2019-04-30 PROCEDURE — G0463 HOSPITAL OUTPT CLINIC VISIT: HCPCS

## 2019-04-30 NOTE — NURSING NOTE
"Oncology Rooming Note    April 30, 2019 9:52 AM   Angel Yanez is a 58 year old male who presents for:    Chief Complaint   Patient presents with     RECHECK     Pt is here for RTN for a Review for MM     Initial Vitals: Blood Pressure (Abnormal) 150/99   Pulse 76   Temperature 97.4  F (36.3  C) (Oral)   Respiration 18   Weight 83.5 kg (184 lb)   Oxygen Saturation 98%   Body Mass Index 27.25 kg/m   Estimated body mass index is 27.25 kg/m  as calculated from the following:    Height as of 2/15/19: 1.75 m (5' 8.9\").    Weight as of this encounter: 83.5 kg (184 lb). Body surface area is 2.01 meters squared.  Data Unavailable Comment: Data Unavailable   No LMP for male patient.  Allergies reviewed: Yes  Medications reviewed: Yes    Medications: Medication refills not needed today.  Pharmacy name entered into EPIC:    [facility] [place], [place] - [address] [facility]ARMACY [facility] [place], [place] - [address]    Clinical concerns: none       Shoshana Salazar MA              "

## 2019-05-02 DIAGNOSIS — C90.01 MULTIPLE MYELOMA IN REMISSION (H): Primary | ICD-10-CM

## 2019-05-02 DIAGNOSIS — Z86.2 PERSONAL HISTORY OF DISEASES OF BLOOD AND BLOOD-FORMING ORGANS: ICD-10-CM

## 2019-05-02 LAB — COPATH REPORT: NORMAL

## 2019-05-06 ENCOUNTER — MEDICAL CORRESPONDENCE (OUTPATIENT)
Dept: TRANSPLANT | Facility: CLINIC | Age: 59
End: 2019-05-06

## 2019-05-06 ENCOUNTER — ALLIED HEALTH/NURSE VISIT (OUTPATIENT)
Dept: TRANSPLANT | Facility: CLINIC | Age: 59
End: 2019-05-06
Attending: INTERNAL MEDICINE
Payer: COMMERCIAL

## 2019-05-06 ENCOUNTER — ANCILLARY PROCEDURE (OUTPATIENT)
Dept: GENERAL RADIOLOGY | Facility: CLINIC | Age: 59
End: 2019-05-06
Attending: INTERNAL MEDICINE
Payer: COMMERCIAL

## 2019-05-06 ENCOUNTER — APPOINTMENT (OUTPATIENT)
Dept: LAB | Facility: CLINIC | Age: 59
End: 2019-05-06
Attending: INTERNAL MEDICINE
Payer: COMMERCIAL

## 2019-05-06 ENCOUNTER — ANCILLARY PROCEDURE (OUTPATIENT)
Dept: CARDIOLOGY | Facility: CLINIC | Age: 59
End: 2019-05-06
Attending: INTERNAL MEDICINE
Payer: COMMERCIAL

## 2019-05-06 ENCOUNTER — OFFICE VISIT (OUTPATIENT)
Dept: INTERVENTIONAL RADIOLOGY/VASCULAR | Facility: CLINIC | Age: 59
End: 2019-05-06
Attending: INTERNAL MEDICINE
Payer: COMMERCIAL

## 2019-05-06 VITALS — DIASTOLIC BLOOD PRESSURE: 99 MMHG | OXYGEN SATURATION: 96 % | SYSTOLIC BLOOD PRESSURE: 159 MMHG | HEART RATE: 77 BPM

## 2019-05-06 VITALS
TEMPERATURE: 98 F | DIASTOLIC BLOOD PRESSURE: 85 MMHG | HEART RATE: 77 BPM | RESPIRATION RATE: 18 BRPM | OXYGEN SATURATION: 98 % | SYSTOLIC BLOOD PRESSURE: 179 MMHG

## 2019-05-06 DIAGNOSIS — C90.01 MULTIPLE MYELOMA IN REMISSION (H): ICD-10-CM

## 2019-05-06 DIAGNOSIS — Z86.2 PERSONAL HISTORY OF DISEASES OF BLOOD AND BLOOD-FORMING ORGANS: ICD-10-CM

## 2019-05-06 DIAGNOSIS — Z71.9 ENCOUNTER FOR COUNSELING: Primary | ICD-10-CM

## 2019-05-06 LAB
ALBUMIN SERPL-MCNC: 3.8 G/DL (ref 3.4–5)
ALBUMIN UR-MCNC: NEGATIVE MG/DL
ALP SERPL-CCNC: 76 U/L (ref 40–150)
ALT SERPL W P-5'-P-CCNC: 26 U/L (ref 0–70)
ANION GAP SERPL CALCULATED.3IONS-SCNC: 6 MMOL/L (ref 3–14)
APPEARANCE UR: CLEAR
APTT PPP: 35 SEC (ref 22–37)
AST SERPL W P-5'-P-CCNC: 26 U/L (ref 0–45)
B2 MICROGLOB SERPL-MCNC: 1.9 MG/L
BASOPHILS # BLD AUTO: 0 10E9/L (ref 0–0.2)
BASOPHILS NFR BLD AUTO: 0.7 %
BILIRUB SERPL-MCNC: 0.4 MG/DL (ref 0.2–1.3)
BILIRUB UR QL STRIP: NEGATIVE
BUN SERPL-MCNC: 12 MG/DL (ref 7–30)
CALCIUM SERPL-MCNC: 9 MG/DL (ref 8.5–10.1)
CHLORIDE SERPL-SCNC: 108 MMOL/L (ref 94–109)
CO2 SERPL-SCNC: 25 MMOL/L (ref 20–32)
COLOR UR AUTO: YELLOW
CREAT SERPL-MCNC: 0.88 MG/DL (ref 0.66–1.25)
DEPRECATED CALCIDIOL+CALCIFEROL SERPL-MC: 46 UG/L (ref 20–75)
DIFFERENTIAL METHOD BLD: ABNORMAL
EBV VCA IGG SER QL IA: >8 AI (ref 0–0.8)
EOSINOPHIL # BLD AUTO: 0 10E9/L (ref 0–0.7)
EOSINOPHIL NFR BLD AUTO: 1.1 %
ERYTHROCYTE [DISTWIDTH] IN BLOOD BY AUTOMATED COUNT: 12.8 % (ref 10–15)
GFR SERPL CREATININE-BSD FRML MDRD: >90 ML/MIN/{1.73_M2}
GLUCOSE SERPL-MCNC: 68 MG/DL (ref 70–99)
GLUCOSE UR STRIP-MCNC: NEGATIVE MG/DL
HCT VFR BLD AUTO: 39 % (ref 40–53)
HGB BLD-MCNC: 13.1 G/DL (ref 13.3–17.7)
HGB UR QL STRIP: NEGATIVE
HSV1 IGG SERPL QL IA: <0.2 AI (ref 0–0.8)
HSV2 IGG SERPL QL IA: 0.7 AI (ref 0–0.8)
IMM GRANULOCYTES # BLD: 0 10E9/L (ref 0–0.4)
IMM GRANULOCYTES NFR BLD: 0 %
INR PPP: 1 (ref 0.86–1.14)
KETONES UR STRIP-MCNC: NEGATIVE MG/DL
LDH SERPL L TO P-CCNC: 253 U/L (ref 85–227)
LEUKOCYTE ESTERASE UR QL STRIP: NEGATIVE
LYMPHOCYTES # BLD AUTO: 1 10E9/L (ref 0.8–5.3)
LYMPHOCYTES NFR BLD AUTO: 37.1 %
MAGNESIUM SERPL-MCNC: 2.3 MG/DL (ref 1.6–2.3)
MCH RBC QN AUTO: 34.7 PG (ref 26.5–33)
MCHC RBC AUTO-ENTMCNC: 33.6 G/DL (ref 31.5–36.5)
MCV RBC AUTO: 103 FL (ref 78–100)
MONOCYTES # BLD AUTO: 0.6 10E9/L (ref 0–1.3)
MONOCYTES NFR BLD AUTO: 21.6 %
NEUTROPHILS # BLD AUTO: 1.1 10E9/L (ref 1.6–8.3)
NEUTROPHILS NFR BLD AUTO: 39.5 %
NITRATE UR QL: NEGATIVE
NRBC # BLD AUTO: 0 10*3/UL
NRBC BLD AUTO-RTO: 0 /100
PH UR STRIP: 6 PH (ref 5–7)
PHOSPHATE SERPL-MCNC: 2.3 MG/DL (ref 2.5–4.5)
PLATELET # BLD AUTO: 132 10E9/L (ref 150–450)
PLATELET # BLD EST: ABNORMAL 10*3/UL
POTASSIUM SERPL-SCNC: 4 MMOL/L (ref 3.4–5.3)
PROT SERPL-MCNC: 7.1 G/DL (ref 6.8–8.8)
RBC # BLD AUTO: 3.78 10E12/L (ref 4.4–5.9)
RBC #/AREA URNS AUTO: 1 /HPF (ref 0–2)
SODIUM SERPL-SCNC: 139 MMOL/L (ref 133–144)
SOURCE: NORMAL
SP GR UR STRIP: 1.01 (ref 1–1.03)
URATE SERPL-MCNC: 4.6 MG/DL (ref 3.5–7.2)
UROBILINOGEN UR STRIP-MCNC: 0 MG/DL (ref 0–2)
WBC # BLD AUTO: 2.8 10E9/L (ref 4–11)
WBC #/AREA URNS AUTO: 1 /HPF (ref 0–5)

## 2019-05-06 PROCEDURE — 83883 ASSAY NEPHELOMETRY NOT SPEC: CPT | Performed by: INTERNAL MEDICINE

## 2019-05-06 PROCEDURE — 83021 HEMOGLOBIN CHROMOTOGRAPHY: CPT | Performed by: INTERNAL MEDICINE

## 2019-05-06 PROCEDURE — 86644 CMV ANTIBODY: CPT | Performed by: INTERNAL MEDICINE

## 2019-05-06 PROCEDURE — 85730 THROMBOPLASTIN TIME PARTIAL: CPT | Performed by: INTERNAL MEDICINE

## 2019-05-06 PROCEDURE — 83615 LACTATE (LD) (LDH) ENZYME: CPT | Performed by: INTERNAL MEDICINE

## 2019-05-06 PROCEDURE — 82306 VITAMIN D 25 HYDROXY: CPT | Performed by: INTERNAL MEDICINE

## 2019-05-06 PROCEDURE — G0463 HOSPITAL OUTPT CLINIC VISIT: HCPCS | Mod: 25,ZF

## 2019-05-06 PROCEDURE — 86900 BLOOD TYPING SEROLOGIC ABO: CPT | Performed by: INTERNAL MEDICINE

## 2019-05-06 PROCEDURE — 85025 COMPLETE CBC W/AUTO DIFF WBC: CPT | Performed by: INTERNAL MEDICINE

## 2019-05-06 PROCEDURE — 86704 HEP B CORE ANTIBODY TOTAL: CPT | Performed by: INTERNAL MEDICINE

## 2019-05-06 PROCEDURE — 86780 TREPONEMA PALLIDUM: CPT | Performed by: INTERNAL MEDICINE

## 2019-05-06 PROCEDURE — 86753 PROTOZOA ANTIBODY NOS: CPT | Performed by: INTERNAL MEDICINE

## 2019-05-06 PROCEDURE — 84550 ASSAY OF BLOOD/URIC ACID: CPT | Performed by: INTERNAL MEDICINE

## 2019-05-06 PROCEDURE — 81001 URINALYSIS AUTO W/SCOPE: CPT | Performed by: INTERNAL MEDICINE

## 2019-05-06 PROCEDURE — 86687 HTLV-I ANTIBODY: CPT | Performed by: INTERNAL MEDICINE

## 2019-05-06 PROCEDURE — 86803 HEPATITIS C AB TEST: CPT | Performed by: INTERNAL MEDICINE

## 2019-05-06 PROCEDURE — 87521 HEPATITIS C PROBE&RVRS TRNSC: CPT | Performed by: INTERNAL MEDICINE

## 2019-05-06 PROCEDURE — 84165 PROTEIN E-PHORESIS SERUM: CPT | Performed by: INTERNAL MEDICINE

## 2019-05-06 PROCEDURE — 86696 HERPES SIMPLEX TYPE 2 TEST: CPT | Performed by: INTERNAL MEDICINE

## 2019-05-06 PROCEDURE — 82784 ASSAY IGA/IGD/IGG/IGM EACH: CPT | Performed by: INTERNAL MEDICINE

## 2019-05-06 PROCEDURE — 36415 COLL VENOUS BLD VENIPUNCTURE: CPT

## 2019-05-06 PROCEDURE — 87535 HIV-1 PROBE&REVERSE TRNSCRPJ: CPT | Performed by: INTERNAL MEDICINE

## 2019-05-06 PROCEDURE — 87798 DETECT AGENT NOS DNA AMP: CPT | Performed by: INTERNAL MEDICINE

## 2019-05-06 PROCEDURE — 82232 ASSAY OF BETA-2 PROTEIN: CPT | Performed by: INTERNAL MEDICINE

## 2019-05-06 PROCEDURE — 83735 ASSAY OF MAGNESIUM: CPT | Performed by: INTERNAL MEDICINE

## 2019-05-06 PROCEDURE — 86703 HIV-1/HIV-2 1 RESULT ANTBDY: CPT | Performed by: INTERNAL MEDICINE

## 2019-05-06 PROCEDURE — 87516 HEPATITIS B DNA AMP PROBE: CPT | Performed by: INTERNAL MEDICINE

## 2019-05-06 PROCEDURE — 00000402 ZZHCL STATISTIC TOTAL PROTEIN: Performed by: INTERNAL MEDICINE

## 2019-05-06 PROCEDURE — 86334 IMMUNOFIX E-PHORESIS SERUM: CPT | Performed by: INTERNAL MEDICINE

## 2019-05-06 PROCEDURE — 86901 BLOOD TYPING SEROLOGIC RH(D): CPT | Performed by: INTERNAL MEDICINE

## 2019-05-06 PROCEDURE — 82785 ASSAY OF IGE: CPT | Performed by: INTERNAL MEDICINE

## 2019-05-06 PROCEDURE — 84100 ASSAY OF PHOSPHORUS: CPT | Performed by: INTERNAL MEDICINE

## 2019-05-06 PROCEDURE — 86695 HERPES SIMPLEX TYPE 1 TEST: CPT | Performed by: INTERNAL MEDICINE

## 2019-05-06 PROCEDURE — 85610 PROTHROMBIN TIME: CPT | Performed by: INTERNAL MEDICINE

## 2019-05-06 PROCEDURE — 87340 HEPATITIS B SURFACE AG IA: CPT | Performed by: INTERNAL MEDICINE

## 2019-05-06 PROCEDURE — 86665 EPSTEIN-BARR CAPSID VCA: CPT | Performed by: INTERNAL MEDICINE

## 2019-05-06 PROCEDURE — 80053 COMPREHEN METABOLIC PANEL: CPT | Performed by: INTERNAL MEDICINE

## 2019-05-06 PROCEDURE — 86850 RBC ANTIBODY SCREEN: CPT | Performed by: INTERNAL MEDICINE

## 2019-05-06 PROCEDURE — G0463 HOSPITAL OUTPT CLINIC VISIT: HCPCS

## 2019-05-06 ASSESSMENT — ENCOUNTER SYMPTOMS
STIFFNESS: 1
ARTHRALGIAS: 1

## 2019-05-06 NOTE — PROGRESS NOTES
BMT Teaching Flowsheet    Angel Yanez is a 58 year old male  Diagnoses of Multiple myeloma in remission (H) and Personal history of diseases of blood and blood-forming organs were pertinent to this visit.    Teaching Topic: work up    Person(s) involved in teaching: Patient  Motivation Level  Asks Questions: Yes  Eager to Learn: Yes  Cooperative: Yes  Receptive (willing/able to accept information): Yes  Any cultural factors/Holiness beliefs that may influence understanding or compliance? No    Teaching concerns addressed: Patient vitals, height and weight done. Consents explained and signed, calendar reviewed, asks questions appropriately.  Labs drawn through venipuncture.  UA sent.  Patient wonders if doing 24 hour urine is necessary to complete today, since results won't be back before close tomorrow.  Will ask DOM and get back to him today.    1311- talked to Su FERRARA about need to do 24 hour urine by tomorrow. She said patient does NOT have to complete this by tomorrow. Message was left on patient's cell phone.    Instructional Materials Used/Given: Calendar, consents    Time spent with patient: 60 minutes.    Specific Concerns: NA

## 2019-05-06 NOTE — PROGRESS NOTES
First Name: Angel  Age: 58 year old   Referring Physician: Dr. Lucio   REASON FOR REFERRAL: Education and evaluation for tunneled catheter placement  Patient is undergoing w/u for autologous BMT, however they are going to be doing a bone marrow harvest    HPI:  This is a patient who was diagnosed in 2004 with multiple myeloma,  he under went an autologous transplant at [facility] in February 2005 leading to complete remission.  Starting in 2016 he had a slow progressive increase in his monoclonal protein.  By March 2018 it was 0.9 g and his bone marrow had 30-40% plasma cells with standard cytogenetics including deletion 13.  He had no bone lesions.    He was treated with RVD from April 2018 through December.  He had a good response down to minimal disease burden.  He had no need for transfusions, bone pain, paresthesias, neurologic symptoms or other complications of his therapy this year.  He had cytoxan chemo-priming, then but he was unable to collect enough stem cells.  Also his bone marrow biopsy, shows 10% plasma cells.  He returned to his primary oncology and received 8 weeks of chemotherapy with Hiral/Tip/Dex   He is now repeating his work-up and then are going to do a bone marrow harvest.      LINE HX:  1. 1/14/2995-2/17/2005:  Right internal jugular vein large bore double lumen tunneled central venous catheter.  Removed because it was infected.  2. 1/22/2019=2/20/2019:  Right internal jugular vein large bore double lumen tunneled central venous catheter.   Removed because they were not able to collect stem cells and he was not in remission.    PAST MEDICAL HISTORY:   Past Medical History:   Diagnosis Date     GERD (gastroesophageal reflux disease)      H/O autologous stem cell transplant (H) 02/2005     Hyperlipidemia      Multiple myeloma (H) 2004     Psoriasis      Psoriatic arthritis (H)      PAST SURGICAL HISTORY:   Past Surgical History:   Procedure Laterality Date     ARTHROPLASTY HIP       COLONOSCOPY        HERNIA REPAIR       IR CVC TUNNEL PLACEMENT > 5 YRS OF AGE  1/22/2019     IR CVC TUNNEL REMOVAL LEFT  2/20/2019     IR FOLLOW UP VISIT OUTPATIENT  1/24/2019     ORTHOPEDIC SURGERY       TRANSPLANT       FAMILY HISTORY:   Family History   Problem Relation Age of Onset     Diabetes Mother      SOCIAL HISTORY:   Social History     Tobacco Use     Smoking status: Former Smoker     Smokeless tobacco: Never Used   Substance Use Topics     Alcohol use: Yes     Comment: occasionaly     PROBLEM LIST:   Patient Active Problem List    Diagnosis Date Noted     Multiple myeloma (H) 01/18/2019     Priority: Medium     Multiple myeloma in remission (H) 11/02/2018     Priority: Medium     MEDICATIONS:   Prescription Medications as of 5/6/2019       Rx Number Disp Refills Start End Last Dispensed Date Next Fill Date Owning Pharmacy    PARoxetine (PAXIL) 20 MG tablet            Sig: Take 20 mg by mouth every morning    Class: Historical    Route: Oral    acetaminophen (TYLENOL) 325 MG tablet    1/23/2019    [facility] [place], [place] - [address] [facility]    Sig: Take 1-2 tablets (325-650 mg) by mouth every 4 hours as needed for mild pain    Class: Historical    Route: Oral    acyclovir (ZOVIRAX) 400 MG tablet   11 9/16/2018        Sig: Take 400 mg by mouth every 12 hours     Class: Historical    Route: Oral    calcipotriene (DOVONOX) 0.005 % SOLN solution   1 12/20/2017        Sig: Apply topically 2 times daily as needed     Class: Historical    Route: Topical    calcium carbonate (CALCIUM CARBONATE) 600 MG tablet            Sig: Take 2 tablets by mouth daily     Class: Historical    Route: Oral    Cholecalciferol (VITAMIN D3) 2000 units CAPS            Sig: Take 2,000 Units by mouth daily     Class: Historical    Route: Oral    loratadine (CLARITIN) 10 MG tablet            Sig: Take 10 mg by mouth daily    Class: Historical    Route: Oral    omeprazole (PRILOSEC OTC) 20 MG tablet    4/7/2018        Sig:  Take 20 mg by mouth daily     Class: Historical    Route: Oral    prednisoLONE acetate (PRED FORTE) 1 % ophthalmic susp    2015        Sig: Place 1-2 drops into both eyes every 4 hours as needed for dry eyes     Class: Historical    Route: Both Eyes      Clinic-Administered Medications as of 2019       Dose Frequency Start End    heparin 100 UNIT/ML injection 5 mL 5 mL EVERY 8 HOURS 2019     Si mLs by Intracatheter route every 8 hours    Class: E-Prescribe    Route: Intracatheter    heparin lock flush 10 UNIT/ML injection 5 mL 5 mL EVERY 1 HOUR PRN 2019     Si mLs by Intracatheter route every hour as needed for line flush    Class: E-Prescribe    Route: Intracatheter        ALLERGIES:    Allergies   Allergen Reactions     Avalide Hives     Chloroxylenol Rash     Technicare solution     Lorazepam Hives     Other reaction(s): Hives       Moxifloxacin Hives     VITALS: BP (!) 159/99   Pulse 77   SpO2 96%     ROS:  Answers for HPI/ROS submitted by the patient on 2019   General Symptoms: No  Skin Symptoms: Yes  HENT Symptoms: No  EYE SYMPTOMS: Yes  HEART SYMPTOMS: No  LUNG SYMPTOMS: No  INTESTINAL SYMPTOMS: No  URINARY SYMPTOMS: No  REPRODUCTIVE SYMPTOMS: No  SKELETAL SYMPTOMS: Yes  BLOOD SYMPTOMS: No  NERVOUS SYSTEM SYMPTOMS: No  MENTAL HEALTH SYMPTOMS: No  Flaking of skin: Yes  Floaters: Yes  Joint pain: Yes  Joint stiffness: Yes      Physical Examination:  Pt is a little anxious today, BP is up  Constitutional: Pleasant, middle aged gentleman, in no acute physical distress, came alone to his appt  Neck: Neck supple. No adenopathy  Musculoskeletal: extremities normal- no gross deformities noted, gait normal and normal muscle tone  Skin: mild flare up on his lower back of his psorasis  Neurologic: negative  Psychiatric: affect normal/bright and mentation appears normal.    RESULTS:  BMP RESULTS:  Lab Results   Component Value Date     2019    POTASSIUM 4.0 2019     CHLORIDE 108 05/06/2019    CO2 25 05/06/2019    ANIONGAP 6 05/06/2019    GLC 68 (L) 05/06/2019    BUN 12 05/06/2019    CR 0.88 05/06/2019    GFRESTIMATED >90 05/06/2019    GFRESTBLACK >90 05/06/2019    ZHEN 9.0 05/06/2019        CBC RESULTS:  Lab Results   Component Value Date    WBC 2.8 (L) 05/06/2019    RBC 3.78 (L) 05/06/2019    HGB 13.1 (L) 05/06/2019    HCT 39.0 (L) 05/06/2019     (H) 05/06/2019    MCH 34.7 (H) 05/06/2019    MCHC 33.6 05/06/2019    RDW 12.8 05/06/2019     (L) 05/06/2019       INR/PTT:  Lab Results   Component Value Date    INR 1.00 05/06/2019    PTT 35 05/06/2019         ASSESSMENT/PLAN:  Type of catheter: 9.5 Fr.  Double lumen tunneled power Hsieh     Preferred Location: Right  Internal Jugular Vein     Platelet count is   Lab Results   Component Value Date     05/06/2019    , and coagulation factors are   Lab Results   Component Value Date    INR 1.00 05/06/2019    ,  Lab Results   Component Value Date    PTT 35 05/06/2019   . The patient is at low risk for bleeding during the procedure.    PROVIDER NOTE:  The tunneled catheter placement procedure and its risks including but not limited to bleeding, infection, fibrin sheath formation and blood clots was explained to Angel.    CONSENT: Affirmation of informed written consent was not obtained.     INSTRUCTIONS/GUIDELINES:   Eating and drinking restriction guidelines were reviewed., Anti-bacterial scrub was given and instructions for its use were reviewed to decrease the risk of infection on the day of the procedure., I explained the expected length of time the tunneled catheter could remain in place., I explained that when they went to the [facility] they would be taught about exit site dressing care, flushing of the lumens and how to care for the catheter when bathing., The role of Interventional Radiology was reviewed. and The principles of infection control were reviewed.    CC  Patient Care  Team:  [facility], [facility] as PCP - General  Adebayo Lucio MD as BMT Physician (Transplant)  Su Pate, RN as BMT Nurse Coordinator (Transplant)  Aliya Collado as Referring Physician (Internal Medicine)  Brooke Lunsford as Registered Nurse (Oncology)  Christie Obrien, MSW as   Janine Loco LICSW as  ( - Clinical)  Kody Mukherjee MD as MD (Hematology & Oncology)  ADEBAYO LUCIO

## 2019-05-06 NOTE — PROGRESS NOTES
Pharmacy Assessment - Pre-Stem Cell Transplant    Patient was seen in clinic 1/15/19. For complete workup, please see note from this date. Medications reviewed with patient, the list below is most up to date. Of note, patient was previously taking lisinopril 10mg daily, which he was told to stop by his outside oncology service.    Past Medical History:  Past Medical History:   Diagnosis Date     Arthritis      Cancer (H)      GERD (gastroesophageal reflux disease)      Multiple myeloma (H)      Skin disease      Stem cell transplant candidate        Medication Allergies:  Allergies   Allergen Reactions     Avalide Hives     Chloroxylenol Rash     Technicare solution     Lorazepam Hives     Other reaction(s): Hives       Moxifloxacin Hives       Current Medications (pre-admit):  Current Outpatient Medications   Medication Sig Dispense Refill     acyclovir (ZOVIRAX) 400 MG tablet Take 400 mg by mouth every 12 hours   11     calcium carbonate (CALCIUM CARBONATE) 600 MG tablet Take 2 tablets by mouth daily        Cholecalciferol (VITAMIN D3) 2000 units CAPS Take 2,000 Units by mouth daily        omeprazole (PRILOSEC OTC) 20 MG tablet Take 20 mg by mouth daily        PARoxetine (PAXIL) 20 MG tablet Take 20 mg by mouth every morning       acetaminophen (TYLENOL) 325 MG tablet Take 1-2 tablets (325-650 mg) by mouth every 4 hours as needed for mild pain       calcipotriene (DOVONOX) 0.005 % SOLN solution Apply topically 2 times daily as needed   1     loratadine (CLARITIN) 10 MG tablet Take 10 mg by mouth daily       prednisoLONE acetate (PRED FORTE) 1 % ophthalmic susp Place 1-2 drops into both eyes every 4 hours as needed for dry eyes

## 2019-05-06 NOTE — LETTER
5/6/2019       RE: Angel Yanez  [address]  [place] [address]     Dear Colleague,    Thank you for referring your patient, Angel Yanez, to the [facility] INTERVENTIONAL RADIOLOGY at [facility]. Please see a copy of my visit note below.    First Name: Angel  Age: 58 year old   Referring Physician: Dr. Lucio   REASON FOR REFERRAL: Education and evaluation for tunneled catheter placement  Patient is undergoing w/u for autologous BMT, however they are going to be doing a bone marrow harvest    HPI:  This is a patient who was diagnosed in 2004 with multiple myeloma,  he under went an autologous transplant at [facility] in February 2005 leading to complete remission.  Starting in 2016 he had a slow progressive increase in his monoclonal protein.  By March 2018 it was 0.9 g and his bone marrow had 30-40% plasma cells with standard cytogenetics including deletion 13.  He had no bone lesions.    He was treated with RVD from April 2018 through December.  He had a good response down to minimal disease burden.  He had no need for transfusions, bone pain, paresthesias, neurologic symptoms or other complications of his therapy this year.  He had cytoxan chemo-priming, then but he was unable to collect enough stem cells.  Also his bone marrow biopsy, shows 10% plasma cells.  He returned to his primary oncology and received 8 weeks of chemotherapy with Hiral/Tip/Dex   He is now repeating his work-up and then are going to do a bone marrow harvest.      LINE HX:  1. 1/14/2995-2/17/2005:  Right internal jugular vein large bore double lumen tunneled central venous catheter.  Removed because it was infected.  2. 1/22/2019=2/20/2019:  Right internal jugular vein large bore double lumen tunneled central venous catheter.   Removed because they were not able to collect stem cells and he was not in remission.    PAST MEDICAL HISTORY:   Past Medical History:   Diagnosis Date     GERD  (gastroesophageal reflux disease)      H/O autologous stem cell transplant (H) 02/2005     Hyperlipidemia      Multiple myeloma (H) 2004     Psoriasis      Psoriatic arthritis (H)      PAST SURGICAL HISTORY:   Past Surgical History:   Procedure Laterality Date     ARTHROPLASTY HIP       COLONOSCOPY       HERNIA REPAIR       IR CVC TUNNEL PLACEMENT > 5 YRS OF AGE  1/22/2019     IR CVC TUNNEL REMOVAL LEFT  2/20/2019     IR FOLLOW UP VISIT OUTPATIENT  1/24/2019     ORTHOPEDIC SURGERY       TRANSPLANT       FAMILY HISTORY:   Family History   Problem Relation Age of Onset     Diabetes Mother      SOCIAL HISTORY:   Social History     Tobacco Use     Smoking status: Former Smoker     Smokeless tobacco: Never Used   Substance Use Topics     Alcohol use: Yes     Comment: occasionaly     PROBLEM LIST:   Patient Active Problem List    Diagnosis Date Noted     Multiple myeloma (H) 01/18/2019     Priority: Medium     Multiple myeloma in remission (H) 11/02/2018     Priority: Medium     MEDICATIONS:   Prescription Medications as of 5/6/2019       Rx Number Disp Refills Start End Last Dispensed Date Next Fill Date Owning Pharmacy    PARoxetine (PAXIL) 20 MG tablet            Sig: Take 20 mg by mouth every morning    Class: Historical    Route: Oral    acetaminophen (TYLENOL) 325 MG tablet    1/23/2019    [facility] [place], [place] - [address] [place] [address]    Sig: Take 1-2 tablets (325-650 mg) by mouth every 4 hours as needed for mild pain    Class: Historical    Route: Oral    acyclovir (ZOVIRAX) 400 MG tablet   11 9/16/2018        Sig: Take 400 mg by mouth every 12 hours     Class: Historical    Route: Oral    calcipotriene (DOVONOX) 0.005 % SOLN solution   1 12/20/2017        Sig: Apply topically 2 times daily as needed     Class: Historical    Route: Topical    calcium carbonate (CALCIUM CARBONATE) 600 MG tablet            Sig: Take 2 tablets by mouth daily     Class: Historical    Route: Oral     Cholecalciferol (VITAMIN D3) 2000 units CAPS            Sig: Take 2,000 Units by mouth daily     Class: Historical    Route: Oral    loratadine (CLARITIN) 10 MG tablet            Sig: Take 10 mg by mouth daily    Class: Historical    Route: Oral    omeprazole (PRILOSEC OTC) 20 MG tablet    2018        Sig: Take 20 mg by mouth daily     Class: Historical    Route: Oral    prednisoLONE acetate (PRED FORTE) 1 % ophthalmic susp    2015        Sig: Place 1-2 drops into both eyes every 4 hours as needed for dry eyes     Class: Historical    Route: Both Eyes      Clinic-Administered Medications as of 2019       Dose Frequency Start End    heparin 100 UNIT/ML injection 5 mL 5 mL EVERY 8 HOURS 2019     Si mLs by Intracatheter route every 8 hours    Class: E-Prescribe    Route: Intracatheter    heparin lock flush 10 UNIT/ML injection 5 mL 5 mL EVERY 1 HOUR PRN 2019     Si mLs by Intracatheter route every hour as needed for line flush    Class: E-Prescribe    Route: Intracatheter        ALLERGIES:    Allergies   Allergen Reactions     Avalide Hives     Chloroxylenol Rash     Technicare solution     Lorazepam Hives     Other reaction(s): Hives       Moxifloxacin Hives     VITALS: BP (!) 159/99   Pulse 77   SpO2 96%     ROS:    Physical Examination:  Pt is a little anxious today, BP is up  Constitutional: Pleasant, middle aged gentleman, in no acute physical distress, came alone to his appt  Neck: Neck supple. No adenopathy  Musculoskeletal: extremities normal- no gross deformities noted, gait normal and normal muscle tone  Skin: mild flare up on his lower back of his psorasis  Neurologic: negative  Psychiatric: affect normal/bright and mentation appears normal.    RESULTS:  BMP RESULTS:  Lab Results   Component Value Date     2019    POTASSIUM 4.0 2019    CHLORIDE 108 2019    CO2 25 2019    ANIONGAP 6 2019    GLC 68 (L) 2019    BUN 12 2019    CR  0.88 05/06/2019    GFRESTIMATED >90 05/06/2019    GFRESTBLACK >90 05/06/2019    ZHEN 9.0 05/06/2019        CBC RESULTS:  Lab Results   Component Value Date    WBC 2.8 (L) 05/06/2019    RBC 3.78 (L) 05/06/2019    HGB 13.1 (L) 05/06/2019    HCT 39.0 (L) 05/06/2019     (H) 05/06/2019    MCH 34.7 (H) 05/06/2019    MCHC 33.6 05/06/2019    RDW 12.8 05/06/2019     (L) 05/06/2019       INR/PTT:  Lab Results   Component Value Date    INR 1.00 05/06/2019    PTT 35 05/06/2019         ASSESSMENT/PLAN:  Type of catheter: 9.5 Fr.  Double lumen tunneled power Hsieh     Preferred Location: Right  Internal Jugular Vein     Platelet count is   Lab Results   Component Value Date     05/06/2019    , and coagulation factors are   Lab Results   Component Value Date    INR 1.00 05/06/2019    ,  Lab Results   Component Value Date    PTT 35 05/06/2019   . The patient is at low risk for bleeding during the procedure.    PROVIDER NOTE:  The tunneled catheter placement procedure and its risks including but not limited to bleeding, infection, fibrin sheath formation and blood clots was explained to Angel.    CONSENT: Affirmation of informed written consent was not obtained.     INSTRUCTIONS/GUIDELINES:   Eating and drinking restriction guidelines were reviewed., Anti-bacterial scrub was given and instructions for its use were reviewed to decrease the risk of infection on the day of the procedure., I explained the expected length of time the tunneled catheter could remain in place., I explained that when they went to the [facility] they would be taught about exit site dressing care, flushing of the lumens and how to care for the catheter when bathing., The role of Interventional Radiology was reviewed. and The principles of infection control were reviewed.    Again, thank you for allowing me to participate in the care of your patient.      Sincerely,    BONITA Ramirez [facility]    CC  Patient Care  Team:  [facility] as PCP - General  Adebayo Lucio MD as BMT Physician (Transplant)  Su Pate, RN as BMT Nurse Coordinator (Transplant)  Aliya Collado as Referring Physician (Internal Medicine)  Brooke Lunsford as Registered Nurse (Oncology)  Christie Obrien, MSW as   Janine Loco LICSW as  ( - Clinical)  Kody Mukherjee MD as MD (Hematology & Oncology)  ADEBAYO LUCIO

## 2019-05-07 ENCOUNTER — OFFICE VISIT (OUTPATIENT)
Dept: TRANSPLANT | Facility: CLINIC | Age: 59
End: 2019-05-07
Attending: INTERNAL MEDICINE
Payer: COMMERCIAL

## 2019-05-07 DIAGNOSIS — C90.01 MULTIPLE MYELOMA IN REMISSION (H): ICD-10-CM

## 2019-05-07 DIAGNOSIS — Z86.2 PERSONAL HISTORY OF DISEASES OF BLOOD AND BLOOD-FORMING ORGANS: ICD-10-CM

## 2019-05-07 DIAGNOSIS — C90.01 MULTIPLE MYELOMA IN REMISSION (H): Primary | ICD-10-CM

## 2019-05-07 LAB
ABO + RH BLD: ABNORMAL
ABO + RH BLD: ABNORMAL
ALBUMIN SERPL ELPH-MCNC: 4.5 G/DL (ref 3.7–5.1)
ALPHA1 GLOB SERPL ELPH-MCNC: 0.3 G/DL (ref 0.2–0.4)
ALPHA2 GLOB SERPL ELPH-MCNC: 0.7 G/DL (ref 0.5–0.9)
B-GLOBULIN SERPL ELPH-MCNC: 0.7 G/DL (ref 0.6–1)
BLD GP AB SCN SERPL QL: ABNORMAL
BLOOD BANK CMNT PATIENT-IMP: ABNORMAL
BLOOD BANK CMNT PATIENT-IMP: ABNORMAL
DEPRECATED CALCIDIOL+CALCIFEROL SERPL-MC: <56 UG/L (ref 20–75)
GAMMA GLOB SERPL ELPH-MCNC: 0.6 G/DL (ref 0.7–1.6)
IGA SERPL-MCNC: 45 MG/DL (ref 70–380)
IGE SERPL-ACNC: 3 KIU/L (ref 0–114)
IGG SERPL-MCNC: 636 MG/DL (ref 695–1620)
IGM SERPL-MCNC: 26 MG/DL (ref 60–265)
KAPPA LC UR-MCNC: <0.3 MG/DL (ref 0.33–1.94)
KAPPA LC/LAMBDA SER: ABNORMAL {RATIO} (ref 0.26–1.65)
LAB SCANNED RESULT: NORMAL
LAMBDA LC SERPL-MCNC: 0.88 MG/DL (ref 0.57–2.63)
M PROTEIN SERPL ELPH-MCNC: 0.2 G/DL
PROT PATTERN SERPL ELPH-IMP: ABNORMAL
PROT PATTERN SERPL IFE-IMP: ABNORMAL
SPECIMEN EXP DATE BLD: ABNORMAL
VITAMIN D2 SERPL-MCNC: <5 UG/L
VITAMIN D3 SERPL-MCNC: 51 UG/L

## 2019-05-07 PROCEDURE — 93005 ELECTROCARDIOGRAM TRACING: CPT

## 2019-05-07 PROCEDURE — 93010 ELECTROCARDIOGRAM REPORT: CPT | Performed by: INTERNAL MEDICINE

## 2019-05-07 NOTE — PROGRESS NOTES
BMT Teaching Flowsheet    Angel Yanez is a 58 year old male  Diagnoses of Multiple myeloma in remission (H) and Personal history of diseases of blood and blood-forming organs were pertinent to this visit.    Teaching Topic: auto bmt for his myeloma    Person(s) involved in teaching: Patient  Motivation Level  Asks Questions: Yes  Eager to Learn: Yes  Cooperative: Yes  Receptive (willing/able to accept information): Yes  Any cultural factors/Confucianism beliefs that may influence understanding or compliance? No    Patient demonstrates understanding of the following:  - Reason for the appointment, diagnosis and treatment plan: Yes  - Knowledge of proper use of medications and conditions for which they are ordered (with special attention to potential side effects or drug interactions): Yes  - Which situations necessitate calling provider and whom to contact: Yes    Teaching concerns addressed: pt has questions regarding what happens if he doesn't think he will collect enough cells.  Pt will have a bone marrow harvest done for his bone marrow transplant    Proper use and care of (medical equipment, care aids, etc.) Yes  Pain management techniques: Yes  Patient instructed on hand hygiene: Yes  How and/when to access community resources: Yes    Infection Control:  Patient demonstrates understanding of the following:  Surgical procedure site care taught Yes  Signs and symptoms of infection taught Yes  Wound care taught Yes  Central venous catheter care taught Yes    Instructional Materials Used/Given:  teaching book including calendars, consents, med info as well as contact information. Also reviewed the discharge teaching guidelines, and routines of both the inpt as well as outpt clinic. Teachback method  Used.       Time spent with patient: 50 minutes.    Specific Concerns: NA

## 2019-05-08 DIAGNOSIS — Z86.2 PERSONAL HISTORY OF DISEASES OF BLOOD AND BLOOD-FORMING ORGANS: ICD-10-CM

## 2019-05-08 DIAGNOSIS — C90.01 MULTIPLE MYELOMA IN REMISSION (H): ICD-10-CM

## 2019-05-08 LAB
COLLECT DURATION TIME UR: 24 H
COLLECT DURATION TIME UR: 24 H
CREAT 24H UR-MRATE: 1.59 G/(24.H) (ref 1–2)
CREAT CL 24H UR+SERPL-VRATE: 127 ML/MIN
CREAT CL/1.73 SQ M 24H UR+SERPL-ARVRAT: 111 ML/MIN/1.7M2 (ref 110–180)
CREAT SERPL-MCNC: 0.88 MG/DL (ref 0.66–1.25)
CREAT UR-MCNC: 96 MG/DL
DONOR CYTOMEGALOVIRUS ABY: POSITIVE
DONOR HEP B CORE ABY: NONREACTIVE
DONOR HEP B SURF AGN: NONREACTIVE
DONOR HEPATITIS C ABY: NONREACTIVE
DONOR HTLV 1&2 ANTIBODY: NONREACTIVE
DONOR TREPONEMA PAL ABY: NONREACTIVE
HEIGHT IN CM: 175 CM
HIV1+2 AB SERPL QL IA: NONREACTIVE
INTERPRETATION ECG - MUSE: NORMAL
MPX SERIES: NONREACTIVE
PROT 24H UR-MRATE: <0.08 G/(24.H) (ref 0.04–0.23)
PROT UR-MCNC: <0.05 G/L
PROT/CREAT 24H UR: NORMAL G/G CR (ref 0–0.2)
SPECIMEN VOL UR: 1650 ML
SPECIMEN VOL UR: 1650 ML
T CRUZI AB SER DONR QL: NONREACTIVE
WNV RNA SPEC QL NAA+PROBE: NONREACTIVE

## 2019-05-09 ENCOUNTER — OFFICE VISIT (OUTPATIENT)
Dept: TRANSPLANT | Facility: CLINIC | Age: 59
End: 2019-05-09
Attending: INTERNAL MEDICINE
Payer: COMMERCIAL

## 2019-05-09 VITALS
OXYGEN SATURATION: 97 % | BODY MASS INDEX: 27.61 KG/M2 | SYSTOLIC BLOOD PRESSURE: 159 MMHG | HEART RATE: 82 BPM | RESPIRATION RATE: 16 BRPM | DIASTOLIC BLOOD PRESSURE: 96 MMHG | WEIGHT: 186.4 LBS

## 2019-05-09 DIAGNOSIS — C90.00 MULTIPLE MYELOMA NOT HAVING ACHIEVED REMISSION (H): Primary | ICD-10-CM

## 2019-05-09 DIAGNOSIS — C90.01 MULTIPLE MYELOMA IN REMISSION (H): ICD-10-CM

## 2019-05-09 DIAGNOSIS — Z86.2 PERSONAL HISTORY OF DISEASES OF BLOOD AND BLOOD-FORMING ORGANS: ICD-10-CM

## 2019-05-09 LAB
DLCOCOR-%PRED-PRE: 98 %
DLCOCOR-PRE: 26.35 ML/MIN/MMHG
DLCOUNC-%PRED-PRE: 93 %
DLCOUNC-PRE: 25.17 ML/MIN/MMHG
DLCOUNC-PRED: 26.79 ML/MIN/MMHG
ERV-%PRED-PRE: 73 %
ERV-PRE: 0.84 L
ERV-PRED: 1.15 L
EXPTIME-PRE: 7.18 SEC
FEF2575-%PRED-PRE: 115 %
FEF2575-PRE: 3.2 L/SEC
FEF2575-PRED: 2.77 L/SEC
FEFMAX-%PRED-PRE: 109 %
FEFMAX-PRE: 9.83 L/SEC
FEFMAX-PRED: 9 L/SEC
FEV1-%PRED-PRE: 108 %
FEV1-PRE: 3.52 L
FEV1FEV6-PRE: 80 %
FEV1FEV6-PRED: 79 %
FEV1FVC-PRE: 80 %
FEV1FVC-PRED: 78 %
FEV1SVC-PRE: 78 %
FEV1SVC-PRED: 67 %
FIFMAX-PRE: 6.93 L/SEC
FRCPLETH-%PRED-PRE: 84 %
FRCPLETH-PRE: 3 L
FRCPLETH-PRED: 3.55 L
FVC-%PRED-PRE: 106 %
FVC-PRE: 4.4 L
FVC-PRED: 4.13 L
IC-%PRED-PRE: 99 %
IC-PRE: 3.64 L
IC-PRED: 3.67 L
PROT ELPH PNL UR ELPH: NORMAL
PROT PATTERN UR ELPH-IMP: NORMAL
RVPLETH-%PRED-PRE: 90 %
RVPLETH-PRE: 2.16 L
RVPLETH-PRED: 2.39 L
TLCPLETH-%PRED-PRE: 95 %
TLCPLETH-PRE: 6.64 L
TLCPLETH-PRED: 6.92 L
VA-%PRED-PRE: 99 %
VA-PRE: 6.3 L
VC-%PRED-PRE: 93 %
VC-PRE: 4.48 L
VC-PRED: 4.82 L

## 2019-05-09 PROCEDURE — G0463 HOSPITAL OUTPT CLINIC VISIT: HCPCS

## 2019-05-09 ASSESSMENT — PAIN SCALES - GENERAL: PAINLEVEL: NO PAIN (0)

## 2019-05-09 NOTE — NURSING NOTE
"Oncology Rooming Note    May 9, 2019 9:54 AM   Angel Yanez is a 58 year old male who presents for:    Chief Complaint   Patient presents with     RECHECK     Pt is here for a BMT [place] close     Initial Vitals: Blood Pressure (Abnormal) 159/96   Pulse 82   Respiration 16   Weight 84.6 kg (186 lb 6.4 oz)   Oxygen Saturation 97%   Body Mass Index 27.61 kg/m   Estimated body mass index is 27.61 kg/m  as calculated from the following:    Height as of 2/15/19: 1.75 m (5' 8.9\").    Weight as of this encounter: 84.6 kg (186 lb 6.4 oz). Body surface area is 2.03 meters squared.  No Pain (0) Comment: Data Unavailable   No LMP for male patient.  Allergies reviewed: Yes  Medications reviewed: Yes    Medications: Medication refills not needed today.  Pharmacy name entered into EPIC:    [facility] - [place], [place] - [address] PHARMACY [facility] - [place], [place] - [address]    Clinical concerns: none       Shoshana Salazar MA              "
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
## 2019-05-10 ENCOUNTER — OFFICE VISIT (OUTPATIENT)
Dept: TRANSPLANT | Facility: CLINIC | Age: 59
End: 2019-05-10
Attending: INTERNAL MEDICINE
Payer: COMMERCIAL

## 2019-05-10 ENCOUNTER — APPOINTMENT (OUTPATIENT)
Dept: LAB | Facility: CLINIC | Age: 59
End: 2019-05-10
Attending: NURSE PRACTITIONER
Payer: COMMERCIAL

## 2019-05-10 ENCOUNTER — ONCOLOGY VISIT (OUTPATIENT)
Dept: TRANSPLANT | Facility: CLINIC | Age: 59
End: 2019-05-10
Attending: NURSE PRACTITIONER
Payer: COMMERCIAL

## 2019-05-10 VITALS
SYSTOLIC BLOOD PRESSURE: 156 MMHG | RESPIRATION RATE: 16 BRPM | HEART RATE: 94 BPM | WEIGHT: 185.5 LBS | DIASTOLIC BLOOD PRESSURE: 97 MMHG | TEMPERATURE: 97.5 F | BODY MASS INDEX: 27.47 KG/M2 | OXYGEN SATURATION: 98 %

## 2019-05-10 DIAGNOSIS — C90.00 MULTIPLE MYELOMA NOT HAVING ACHIEVED REMISSION (H): Primary | ICD-10-CM

## 2019-05-10 DIAGNOSIS — Z86.2 PERSONAL HISTORY OF DISEASES OF BLOOD AND BLOOD-FORMING ORGANS: ICD-10-CM

## 2019-05-10 DIAGNOSIS — C90.01 MULTIPLE MYELOMA IN REMISSION (H): Primary | ICD-10-CM

## 2019-05-10 DIAGNOSIS — C90.00 MULTIPLE MYELOMA NOT HAVING ACHIEVED REMISSION (H): ICD-10-CM

## 2019-05-10 LAB
ABO + RH BLD: ABNORMAL
ABO + RH BLD: ABNORMAL
BASOPHILS # BLD AUTO: 0 10E9/L (ref 0–0.2)
BASOPHILS NFR BLD AUTO: 0.3 %
BLD GP AB SCN SERPL QL: ABNORMAL
BLOOD BANK CMNT PATIENT-IMP: ABNORMAL
BLOOD BANK CMNT PATIENT-IMP: ABNORMAL
DIFFERENTIAL METHOD BLD: ABNORMAL
EOSINOPHIL # BLD AUTO: 0 10E9/L (ref 0–0.7)
EOSINOPHIL NFR BLD AUTO: 1 %
ERYTHROCYTE [DISTWIDTH] IN BLOOD BY AUTOMATED COUNT: 12.7 % (ref 10–15)
HCT VFR BLD AUTO: 39.2 % (ref 40–53)
HGB BLD-MCNC: 13.6 G/DL (ref 13.3–17.7)
IMM GRANULOCYTES # BLD: 0 10E9/L (ref 0–0.4)
IMM GRANULOCYTES NFR BLD: 0.3 %
INR PPP: 1.01 (ref 0.86–1.14)
LYMPHOCYTES # BLD AUTO: 1.3 10E9/L (ref 0.8–5.3)
LYMPHOCYTES NFR BLD AUTO: 44.5 %
MCH RBC QN AUTO: 34.7 PG (ref 26.5–33)
MCHC RBC AUTO-ENTMCNC: 34.7 G/DL (ref 31.5–36.5)
MCV RBC AUTO: 100 FL (ref 78–100)
MONOCYTES # BLD AUTO: 0.4 10E9/L (ref 0–1.3)
MONOCYTES NFR BLD AUTO: 14.4 %
NEUTROPHILS # BLD AUTO: 1.2 10E9/L (ref 1.6–8.3)
NEUTROPHILS NFR BLD AUTO: 39.5 %
NRBC # BLD AUTO: 0 10*3/UL
NRBC BLD AUTO-RTO: 0 /100
PLATELET # BLD AUTO: 154 10E9/L (ref 150–450)
RBC # BLD AUTO: 3.92 10E12/L (ref 4.4–5.9)
SPECIMEN EXP DATE BLD: ABNORMAL
WBC # BLD AUTO: 2.9 10E9/L (ref 4–11)

## 2019-05-10 PROCEDURE — G0463 HOSPITAL OUTPT CLINIC VISIT: HCPCS | Mod: 25

## 2019-05-10 PROCEDURE — 96372 THER/PROPH/DIAG INJ SC/IM: CPT

## 2019-05-10 PROCEDURE — 86901 BLOOD TYPING SEROLOGIC RH(D): CPT | Performed by: INTERNAL MEDICINE

## 2019-05-10 PROCEDURE — 85025 COMPLETE CBC W/AUTO DIFF WBC: CPT | Performed by: INTERNAL MEDICINE

## 2019-05-10 PROCEDURE — 86900 BLOOD TYPING SEROLOGIC ABO: CPT | Performed by: INTERNAL MEDICINE

## 2019-05-10 PROCEDURE — 86850 RBC ANTIBODY SCREEN: CPT | Performed by: INTERNAL MEDICINE

## 2019-05-10 PROCEDURE — 25000128 H RX IP 250 OP 636: Mod: ZF | Performed by: PHYSICIAN ASSISTANT

## 2019-05-10 PROCEDURE — 85610 PROTHROMBIN TIME: CPT | Performed by: INTERNAL MEDICINE

## 2019-05-10 RX ORDER — HEPARIN SODIUM,PORCINE 10 UNIT/ML
5 VIAL (ML) INTRAVENOUS
Status: CANCELLED | OUTPATIENT
Start: 2019-05-11

## 2019-05-10 RX ADMIN — SARGRAMOSTIM 1000 MCG: 250 INJECTION, POWDER, FOR SOLUTION INTRAVENOUS; SUBCUTANEOUS at 08:54

## 2019-05-10 ASSESSMENT — PAIN SCALES - GENERAL: PAINLEVEL: NO PAIN (0)

## 2019-05-10 NOTE — PROGRESS NOTES
BMT Clinic Note     Mr. Yanez is a 59yo male receiving GMCSF in preparation for bone marrow harvest & 2nd auto PBSCT for MM     HPI:  Feeling good.  Afebrile with no cough, congestion or dyspnea.  Eating with no N/V/D.  No pain or bleeding.    Review of Systems: 10 point ROS negative except as noted above.    Physical Exam:   Blood pressure (!) 156/97, pulse 94, temperature 97.5  F (36.4  C), temperature source Oral, resp. rate 16, weight 84.1 kg (185 lb 8 oz), SpO2 98 %.  General: NAD   Eyes: : FARRAH, sclera anicteric   Nose/Mouth/Throat: OP clear, buccal mucosa moist, no ulcerations   Lungs: CTA bilaterally  Cardiovascular: RRR, no M/R/G.  HTN  Abdominal/Rectal: +BS, soft, NT, ND  Lymphatics: no edema  Skin: no rashes or petechaie  Neuro: A&O   Labs:  Lab Results   Component Value Date    WBC 2.9 (L) 05/10/2019    ANEU 1.2 (L) 05/10/2019    HGB 13.6 05/10/2019    HCT 39.2 (L) 05/10/2019     05/10/2019     05/06/2019    POTASSIUM 4.0 05/06/2019    CHLORIDE 108 05/06/2019    CO2 25 05/06/2019    GLC 68 (L) 05/06/2019    BUN 12 05/06/2019    CR 0.88 05/08/2019    MAG 2.3 05/06/2019    INR 1.01 05/10/2019     Assessment and Plan: Mr. Yanez is a 59yo male receiving GMCSF in preparation for bone marrow harvest & 2nd auto PBSCT for MM     1.  BMT:  Begin GMCSF today.  Scheduled for 5 days with BM harvest on day 5 followed by auto PBSCT.    2.  CV:  HTN.  Had been on Lisinopril during his first transplant, but has not been on anything recently.  BP has been running high, but I did not start anything today.      RTC daily for GMCSF.  Admit for BM harvest 5/14    Karla Manzo

## 2019-05-10 NOTE — NURSING NOTE
Chief Complaint   Patient presents with     Blood Draw     Labs drawn via  by RN in lab. VS taken. Patient checked in for next appointment.     Labs collected from venipuncture by RN. Green JIC tube drawn. Vitals taken. /97, provider notified. Checked in for appointment(s).    Ling Rios RN

## 2019-05-10 NOTE — NURSING NOTE
"Oncology Rooming Note    May 10, 2019 7:38 AM   Angel Yanez is a 58 year old male who presents for:    Chief Complaint   Patient presents with     Blood Draw     Labs drawn via  by RN in lab. VS taken. Patient checked in for next appointment.     RECHECK     RTN- MM     Initial Vitals: BP (!) 156/97 (BP Location: Right arm, Patient Position: Sitting, Cuff Size: Adult Regular)   Pulse 94   Temp 97.5  F (36.4  C) (Oral)   Resp 16   Wt 84.1 kg (185 lb 8 oz)   SpO2 98%   BMI 27.47 kg/m   Estimated body mass index is 27.47 kg/m  as calculated from the following:    Height as of 2/15/19: 1.75 m (5' 8.9\").    Weight as of this encounter: 84.1 kg (185 lb 8 oz). Body surface area is 2.02 meters squared.  No Pain (0) Comment: Data Unavailable   No LMP for male patient.  Allergies reviewed: Yes  Medications reviewed: Yes    Medications: Medication refills not needed today.  Pharmacy name entered into EPIC:    [facility] - [place], [place] - [address] [facility] - [place], [place] - [address] [facility]    Clinical concerns: None       Kathy Perez CMA              "

## 2019-05-11 ENCOUNTER — OFFICE VISIT (OUTPATIENT)
Dept: TRANSPLANT | Facility: CLINIC | Age: 59
End: 2019-05-11
Attending: INTERNAL MEDICINE
Payer: COMMERCIAL

## 2019-05-11 VITALS
HEART RATE: 100 BPM | OXYGEN SATURATION: 96 % | DIASTOLIC BLOOD PRESSURE: 84 MMHG | SYSTOLIC BLOOD PRESSURE: 135 MMHG | BODY MASS INDEX: 27.99 KG/M2 | HEIGHT: 69 IN | TEMPERATURE: 98.8 F | RESPIRATION RATE: 16 BRPM | WEIGHT: 189 LBS

## 2019-05-11 DIAGNOSIS — C90.01 MULTIPLE MYELOMA IN REMISSION (H): Primary | ICD-10-CM

## 2019-05-11 PROCEDURE — 96372 THER/PROPH/DIAG INJ SC/IM: CPT

## 2019-05-11 PROCEDURE — 25000128 H RX IP 250 OP 636: Mod: ZF | Performed by: PHYSICIAN ASSISTANT

## 2019-05-11 RX ORDER — HEPARIN SODIUM,PORCINE 10 UNIT/ML
5 VIAL (ML) INTRAVENOUS
Status: CANCELLED | OUTPATIENT
Start: 2019-05-12

## 2019-05-11 RX ADMIN — SARGRAMOSTIM 1000 MCG: 250 INJECTION, POWDER, FOR SOLUTION INTRAVENOUS; SUBCUTANEOUS at 08:26

## 2019-05-11 ASSESSMENT — PAIN SCALES - GENERAL: PAINLEVEL: NO PAIN (0)

## 2019-05-11 ASSESSMENT — MIFFLIN-ST. JEOR: SCORE: 1656.06

## 2019-05-11 NOTE — PROGRESS NOTES
Patient presents to the [facility] for sargramostim (LEUKINE) injection 1,000 mcg. Order written by Dr. Jones was completed today. Name and  were verified by patient. See MAR for medication details. Medication was given in the following site: left arm. Patient tolerated injection well and was discharged to home.  Tere Gonzalez CMA

## 2019-05-11 NOTE — NURSING NOTE
Patient presents to the [facility] for sargramostim (LEUKINE) injection 1,000 mcg. Order written by Dr. Jones was completed today. Name and  were verified by patient. See MAR for medication details. Medication was given in the following site: left arm. Patient tolerated injection well and was discharged to home.  Tere Gonzalez CMA

## 2019-05-12 ENCOUNTER — OFFICE VISIT (OUTPATIENT)
Dept: TRANSPLANT | Facility: CLINIC | Age: 59
End: 2019-05-12
Attending: INTERNAL MEDICINE
Payer: COMMERCIAL

## 2019-05-12 VITALS
TEMPERATURE: 98.1 F | SYSTOLIC BLOOD PRESSURE: 118 MMHG | HEART RATE: 95 BPM | RESPIRATION RATE: 16 BRPM | OXYGEN SATURATION: 99 % | DIASTOLIC BLOOD PRESSURE: 80 MMHG | BODY MASS INDEX: 27.98 KG/M2 | HEIGHT: 69 IN | WEIGHT: 188.93 LBS

## 2019-05-12 DIAGNOSIS — C90.01 MULTIPLE MYELOMA IN REMISSION (H): Primary | ICD-10-CM

## 2019-05-12 PROCEDURE — 96372 THER/PROPH/DIAG INJ SC/IM: CPT

## 2019-05-12 PROCEDURE — 25000128 H RX IP 250 OP 636: Mod: ZF | Performed by: PHYSICIAN ASSISTANT

## 2019-05-12 RX ORDER — HEPARIN SODIUM,PORCINE 10 UNIT/ML
5 VIAL (ML) INTRAVENOUS
Status: CANCELLED | OUTPATIENT
Start: 2019-05-13

## 2019-05-12 RX ADMIN — SARGRAMOSTIM 1000 MCG: 250 INJECTION, POWDER, FOR SOLUTION INTRAVENOUS; SUBCUTANEOUS at 08:13

## 2019-05-12 ASSESSMENT — MIFFLIN-ST. JEOR: SCORE: 1655.76

## 2019-05-12 ASSESSMENT — PAIN SCALES - GENERAL: PAINLEVEL: NO PAIN (0)

## 2019-05-12 NOTE — NURSING NOTE
Patient presents to the [facility] for sargramostim (LEUKINE) injection 1,000 mcg. Order written by Dr. Jones was completed today. Name and  were verified by patient. See MAR for medication details. Medication was given in the following site: right arm. Patient tolerated injection well and was discharged to home.  Tere Gonzalez CMA

## 2019-05-12 NOTE — PROGRESS NOTES
Patient presents to the [facility] for sargramostim (LEUKINE) injection 1,000 mcg. Order written by Dr. Jones was completed today. Name and  were verified by patient. See MAR for medication details. Medication was given in the following site: right arm. Patient tolerated injection well and was discharged to home.  Tere Gonzalez CMA

## 2019-05-13 ENCOUNTER — OFFICE VISIT (OUTPATIENT)
Dept: TRANSPLANT | Facility: CLINIC | Age: 59
End: 2019-05-13
Attending: INTERNAL MEDICINE
Payer: COMMERCIAL

## 2019-05-13 ENCOUNTER — MEDICAL CORRESPONDENCE (OUTPATIENT)
Dept: TRANSPLANT | Facility: CLINIC | Age: 59
End: 2019-05-13

## 2019-05-13 ENCOUNTER — APPOINTMENT (OUTPATIENT)
Dept: LAB | Facility: CLINIC | Age: 59
End: 2019-05-13
Attending: INTERNAL MEDICINE
Payer: COMMERCIAL

## 2019-05-13 VITALS
TEMPERATURE: 97.7 F | RESPIRATION RATE: 18 BRPM | WEIGHT: 186.6 LBS | HEART RATE: 83 BPM | DIASTOLIC BLOOD PRESSURE: 89 MMHG | BODY MASS INDEX: 27.64 KG/M2 | BODY MASS INDEX: 27.62 KG/M2 | HEART RATE: 83 BPM | OXYGEN SATURATION: 98 % | RESPIRATION RATE: 18 BRPM | SYSTOLIC BLOOD PRESSURE: 122 MMHG | SYSTOLIC BLOOD PRESSURE: 122 MMHG | DIASTOLIC BLOOD PRESSURE: 89 MMHG | TEMPERATURE: 97.7 F | WEIGHT: 186.51 LBS | OXYGEN SATURATION: 98 %

## 2019-05-13 DIAGNOSIS — C90.00 MULTIPLE MYELOMA, REMISSION STATUS UNSPECIFIED (H): Primary | ICD-10-CM

## 2019-05-13 DIAGNOSIS — C90.01 MULTIPLE MYELOMA IN REMISSION (H): ICD-10-CM

## 2019-05-13 LAB
ALBUMIN SERPL-MCNC: 3.7 G/DL (ref 3.4–5)
ALP SERPL-CCNC: 63 U/L (ref 40–150)
ALT SERPL W P-5'-P-CCNC: 17 U/L (ref 0–70)
ANION GAP SERPL CALCULATED.3IONS-SCNC: 8 MMOL/L (ref 3–14)
AST SERPL W P-5'-P-CCNC: <3 U/L (ref 0–45)
BASOPHILS # BLD AUTO: 0 10E9/L (ref 0–0.2)
BASOPHILS NFR BLD AUTO: 0.4 %
BILIRUB SERPL-MCNC: 0.2 MG/DL (ref 0.2–1.3)
BUN SERPL-MCNC: 14 MG/DL (ref 7–30)
CALCIUM SERPL-MCNC: 8.9 MG/DL (ref 8.5–10.1)
CHLORIDE SERPL-SCNC: 106 MMOL/L (ref 94–109)
CO2 SERPL-SCNC: 27 MMOL/L (ref 20–32)
CREAT SERPL-MCNC: 0.98 MG/DL (ref 0.66–1.25)
DIFFERENTIAL METHOD BLD: ABNORMAL
EOSINOPHIL # BLD AUTO: 0.1 10E9/L (ref 0–0.7)
EOSINOPHIL NFR BLD AUTO: 1.6 %
ERYTHROCYTE [DISTWIDTH] IN BLOOD BY AUTOMATED COUNT: 12.9 % (ref 10–15)
GFR SERPL CREATININE-BSD FRML MDRD: 83 ML/MIN/{1.73_M2}
GLUCOSE SERPL-MCNC: 89 MG/DL (ref 70–99)
HCT VFR BLD AUTO: 40.8 % (ref 40–53)
HGB BLD-MCNC: 13.9 G/DL (ref 13.3–17.7)
IMM GRANULOCYTES # BLD: 0 10E9/L (ref 0–0.4)
IMM GRANULOCYTES NFR BLD: 0.4 %
INR PPP: 1.07 (ref 0.86–1.14)
LYMPHOCYTES # BLD AUTO: 1.3 10E9/L (ref 0.8–5.3)
LYMPHOCYTES NFR BLD AUTO: 16.6 %
MCH RBC QN AUTO: 34.9 PG (ref 26.5–33)
MCHC RBC AUTO-ENTMCNC: 34.1 G/DL (ref 31.5–36.5)
MCV RBC AUTO: 103 FL (ref 78–100)
MONOCYTES # BLD AUTO: 0.8 10E9/L (ref 0–1.3)
MONOCYTES NFR BLD AUTO: 10.5 %
NEUTROPHILS # BLD AUTO: 5.4 10E9/L (ref 1.6–8.3)
NEUTROPHILS NFR BLD AUTO: 70.5 %
NRBC # BLD AUTO: 0 10*3/UL
NRBC BLD AUTO-RTO: 0 /100
PLATELET # BLD AUTO: 152 10E9/L (ref 150–450)
POTASSIUM SERPL-SCNC: 4 MMOL/L (ref 3.4–5.3)
PROT SERPL-MCNC: 6.8 G/DL (ref 6.8–8.8)
RBC # BLD AUTO: 3.98 10E12/L (ref 4.4–5.9)
SODIUM SERPL-SCNC: 140 MMOL/L (ref 133–144)
WBC # BLD AUTO: 7.6 10E9/L (ref 4–11)

## 2019-05-13 PROCEDURE — 36415 COLL VENOUS BLD VENIPUNCTURE: CPT

## 2019-05-13 PROCEDURE — 85610 PROTHROMBIN TIME: CPT | Performed by: INTERNAL MEDICINE

## 2019-05-13 PROCEDURE — 86850 RBC ANTIBODY SCREEN: CPT | Performed by: INTERNAL MEDICINE

## 2019-05-13 PROCEDURE — 25000128 H RX IP 250 OP 636: Mod: ZF | Performed by: PHYSICIAN ASSISTANT

## 2019-05-13 PROCEDURE — 86900 BLOOD TYPING SEROLOGIC ABO: CPT | Performed by: INTERNAL MEDICINE

## 2019-05-13 PROCEDURE — 86904 BLOOD TYPING PATIENT SERUM: CPT | Performed by: ANESTHESIOLOGY

## 2019-05-13 PROCEDURE — 84999 UNLISTED CHEMISTRY PROCEDURE: CPT | Performed by: ANESTHESIOLOGY

## 2019-05-13 PROCEDURE — 86901 BLOOD TYPING SEROLOGIC RH(D): CPT | Performed by: INTERNAL MEDICINE

## 2019-05-13 PROCEDURE — 86902 BLOOD TYPE ANTIGEN DONOR EA: CPT | Performed by: INTERNAL MEDICINE

## 2019-05-13 PROCEDURE — 0001U RBC DNA HEA 35 AG 11 BLD GRP: CPT | Performed by: PHYSICIAN ASSISTANT

## 2019-05-13 PROCEDURE — 80053 COMPREHEN METABOLIC PANEL: CPT | Performed by: INTERNAL MEDICINE

## 2019-05-13 PROCEDURE — 85025 COMPLETE CBC W/AUTO DIFF WBC: CPT | Performed by: INTERNAL MEDICINE

## 2019-05-13 PROCEDURE — 96372 THER/PROPH/DIAG INJ SC/IM: CPT

## 2019-05-13 RX ORDER — HEPARIN SODIUM,PORCINE 10 UNIT/ML
5 VIAL (ML) INTRAVENOUS
Status: CANCELLED | OUTPATIENT
Start: 2019-05-14

## 2019-05-13 RX ADMIN — SARGRAMOSTIM 1000 MCG: 250 INJECTION, POWDER, FOR SOLUTION INTRAVENOUS; SUBCUTANEOUS at 11:28

## 2019-05-13 ASSESSMENT — ANXIETY QUESTIONNAIRES
2. NOT BEING ABLE TO STOP OR CONTROL WORRYING: NOT AT ALL
1. FEELING NERVOUS, ANXIOUS, OR ON EDGE: SEVERAL DAYS
3. WORRYING TOO MUCH ABOUT DIFFERENT THINGS: SEVERAL DAYS
6. BECOMING EASILY ANNOYED OR IRRITABLE: SEVERAL DAYS
7. FEELING AFRAID AS IF SOMETHING AWFUL MIGHT HAPPEN: NOT AT ALL
GAD7 TOTAL SCORE: 3
5. BEING SO RESTLESS THAT IT IS HARD TO SIT STILL: NOT AT ALL
IF YOU CHECKED OFF ANY PROBLEMS ON THIS QUESTIONNAIRE, HOW DIFFICULT HAVE THESE PROBLEMS MADE IT FOR YOU TO DO YOUR WORK, TAKE CARE OF THINGS AT HOME, OR GET ALONG WITH OTHER PEOPLE: SOMEWHAT DIFFICULT

## 2019-05-13 ASSESSMENT — PAIN SCALES - GENERAL
PAINLEVEL: NO PAIN (0)
PAINLEVEL: NO PAIN (0)

## 2019-05-13 ASSESSMENT — PATIENT HEALTH QUESTIONNAIRE - PHQ9
5. POOR APPETITE OR OVEREATING: NOT AT ALL
SUM OF ALL RESPONSES TO PHQ QUESTIONS 1-9: 4

## 2019-05-13 NOTE — PROGRESS NOTES
Blood and Marrow Transplant   Psychosocial Assessment with   Clinical      REPEAT Assessment completed on 2019 of living situation, support system, financial status, functional status, coping, stressors, need for resources and social work intervention provided as needed. Information for this assessment was provided by pt's report in addition to medical chart review and consultation with medical team.      Present at assessment:   Patient: Angel Yanez  : ZUHAIR Woodard, [facility]     Diagnosis: Multiple Myeloma     Transplant type: Autologous     Donor: Autologous      Physician: Adebayo Lucio MD     Nurse Coordinator: Su Pate RN     Permanent Address:   [address] [place] [id number]     Contact Information:  Pt's Home Phone: [phone]  Pt's Cell Phone: [phone]  Pt Email: [email or web address]  Wife-Jennifer Yanez's Phone: [phone]  Son-Dalton Yanez's Phone: [phone]     Presenting Information:  Zaheer is a 58 year old male diagnosed with multiple myeloma who presents for evaluation for autologous transplant at the [facility] ([facility]). This will be pt's 2nd autologous transplant at the [facility].     Decision Making: Self     Health Care Directive: Yes. Pt has completed the healthcare directive completed and on file.      Relationship Status: . Pt described relationship as stable and supportive.      Special Needs: None identified at this time.      Family/Support System: Pt endorsed a good support system including family and close friends who will be available to support pt throughout transplant process.      Spouse: Jennifer Yanez  Children: 3 Children; Son-Alivia Yanez (28-Lives in [place], [place]), Son-Magdiel Yanez (Lives in [place], [place]) and Daughter-Jerry Yanez (Lives in [place])  Grandchildren: 1 Grandson-Alivia Tyler (1.5 years old)  Parents:   Siblings: 1 Sister-Sofie Calvin (Lives in [facility]  "[place])     Caregiver: JANELL discussed with pt the caregiver role and expectation at length. During last PSA on 1/15/2019, pt said he won t have a caregiver 24/7 but will tell me he does if that is required and pt said SW can put his sonLucille down as well  if Social Work needed another name on the caregiver contract . During the PSA today (5/6/2019), pt said \"oh yea I will have a caregiver\". Pt's wife runs a small  out of the home, so she is there during the day. SW reminded pt of the last conversation and JANELL repeatedly reminded pt he is required to have a caregiver 24/7 for 30 days. Pt signed the caregiver contract which will be scanned into the EMR. Pt said his wife-Jennifer and sonKiera will be the caregivers. Caregiver education and resources provided. EULALIO Delgadillo, and Andrew are aware. Dr. Lucio said he would reinforce the caregiver requirement during the close appointment.       Caregiver Contact Information:  WifeTessie Yanez's Phone: [phone]  SonKiera Yanez's Phone: [phone]     Transportation Mode: Private Vehicle. During last PSA on 1/15/2019, pt said he will be driving himself to clinic post-transplant. JANELL thoroughly explained to pt that he cannot drive post-transplant until approved by a Physician. EULALIO Delgadillo, and Andrew were notified. Dr. Lucio said he would reinforce the \"no driving\" during the close appointment as well.      Insurance: Pt has [employer] health insurance; pt has an . Pt denied specific insurance concerns at this time. JANELL reiterated information about the [employer]  should specific insurance questions arise as pt moves through transplant process. Future Insurance questions referred to [employer] -Jessica Downs (P: [phone]).      Sources of Income: Pt's income, pt went back to work, and his wife's income. Once pt completely stops working, pt will re-file for short term " "disability. Pt also has long term disability available. Pt denied anticipation of financial hardship related to BMT at this time. SW provided information on gay options and encouraged pt to contact this SW for support should financial situation change.      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 Pt's wife-Jennifer owns her own  at home.      Mental Health: Pt denied a history of mental health concerns, specific diagnoses or medications at this time. SW explained that it's not uncommon for patients going through transplant to experience symptoms of depression/anxiety.      PHQ-9:  Pt scored a 4 which indicates no sign of depression on the depression severity scale. Pt does not endorse feelings  of depression at this time.      GAD7:  Pt scored a 3 which indicates no sign of anxiety on the Generalized Anxiety Disorder Questionnaire. Pt endorses this is an accurate reflection of his emotional state.     Chemical Use: Pt reported that he quit smoking 35 years ago. Pt reported minimal alcohol consumption; 2 beers in the past 3 months. Pt reported no hx of marijuana or other drugs. Based on the information provided, there appear to be no specific risks or concerns identified at this time.      Trauma/Loss/Abuse History: Multiple losses associated with cancer diagnosis and treatment, including health, employment, changes to physical appearance, etc.      Spirituality: Pt would not like a blessing ceremony while inpatient. SW explained that there are Chaplains on the unit and pt can request to meet with a  at anytime.     Coping: Pt noted that he is currently feeling \"frustrated and ready to begin\". Pt shared that his main coping mechanisms are exercise (riding bike). SW and pt discussed additional positive coping mechanisms that pt can utilize while in the hospital. While hospitalized, pt plans to walk the hallways and watch tv.      Education Provided: Transplant process expectations, " Caregiver requirements, Caregiver self-care, Financial issues related to transplant, Financial resources/grants available, Common psychosocial stressors pre/post transplant, Support group(s) available, Hospital resources available, Social Work role and Resources for grandchild.     Interventions Provided: Psychosocial Support and Education      Assessment and Recommendations for Team:  Pt is a 58 year old male diagnosed with multiple myeloma who is here undergoing preparation for a planned autologous transplant. Pt appeared to be in good spirits during conversation. Pt is pleasant and able to articulate concerns/coping mechanisms in an appropriate manner. Pt feels comfortable communicating with the medical team. Pt has a good supportive network of family and friends who are involved. Pt has developed a few coping mechanisms such as exercising and working on his hobbies. Pt may benefit from assistance in developing coping mechanisms. Pt and pt's family will benefit from ongoing psychosocial support in regards to coping with the adjustment to the BMT process.      Pt has a good support system. Please see caregiver section for update. Pt verbalizes understanding of the transplant process and wanting to proceed. SW provided contact information and encouraged pt to contact SW with questions, concerns, resources and for support.     Per this assessment, SW did not identify any barriers to this patient moving forward with transplant.     Important Information:   -See caregiver section. Check in with pt while inpatient to re-confirm plan.  -Pt said he plans to drive to clinic appointments post-transplant. Dr. Lucio notified and Dr. Lucio was going to tell pt he cannot drive.     Follow up Planned:   Psychosocial support     Christie MOFFETT, [facility]  Phone: [phone]  Pager: [phone]
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
## 2019-05-13 NOTE — PROGRESS NOTES
Chief Complaint   Patient presents with     Imm/Inj     Patient with Multiple Myeloma here for Leukine injection        Leukine 1000 mcg administered subcutaneous in left arm per order. Patient tolerated procedure with no incident.   Stacey Lovell CMA May 13, 2019

## 2019-05-13 NOTE — NURSING NOTE
"Oncology Rooming Note    May 13, 2019 11:43 AM   Angel Yanez is a 58 year old male who presents for:    Chief Complaint   Patient presents with     Oncology Clinic Visit     Patient with Multiple Myeloma here for provider visit      Initial Vitals: /89 (BP Location: Right arm, Patient Position: Chair, Cuff Size: Adult Regular)   Pulse 83   Temp 97.7  F (36.5  C) (Oral)   Resp 18   Wt 84.6 kg (186 lb 9.6 oz)   SpO2 98%   BMI 27.64 kg/m   Estimated body mass index is 27.64 kg/m  as calculated from the following:    Height as of 5/12/19: 1.75 m (5' 8.9\").    Weight as of this encounter: 84.6 kg (186 lb 9.6 oz). Body surface area is 2.03 meters squared.  No Pain (0) Comment: Data Unavailable   No LMP for male patient.  Allergies reviewed: Yes  Medications reviewed: Yes    Medications: Medication refills not needed today.  Pharmacy name entered into EPIC:    [facility] [place] [facility] [address] [facility] - [place], [place] - [address] [facility] [address]    Clinical concerns:       Stacey Lovell CMA              "

## 2019-05-13 NOTE — NURSING NOTE
Lab drawn from right arm via venipuncture by CMA in clinic.     Stacey Lovell CMA May 13, 2019

## 2019-05-14 ENCOUNTER — ANESTHESIA EVENT (OUTPATIENT)
Dept: SURGERY | Facility: CLINIC | Age: 59
End: 2019-05-14
Payer: COMMERCIAL

## 2019-05-14 ENCOUNTER — HOSPITAL ENCOUNTER (OUTPATIENT)
Facility: CLINIC | Age: 59
Discharge: HOME | End: 2019-05-15
Admitting: INTERNAL MEDICINE
Payer: COMMERCIAL

## 2019-05-14 ENCOUNTER — DOCUMENTATION ONLY (OUTPATIENT)
Dept: TRANSPLANT | Facility: CLINIC | Age: 59
End: 2019-05-14

## 2019-05-14 ENCOUNTER — ANESTHESIA (OUTPATIENT)
Dept: SURGERY | Facility: CLINIC | Age: 59
End: 2019-05-14
Payer: COMMERCIAL

## 2019-05-14 DIAGNOSIS — C90.00 MULTIPLE MYELOMA NOT HAVING ACHIEVED REMISSION (H): Primary | ICD-10-CM

## 2019-05-14 PROBLEM — Z52.3 BONE MARROW DONOR: Status: ACTIVE | Noted: 2019-05-14

## 2019-05-14 LAB
ABO + RH BLD: ABNORMAL
ABO + RH BLD: ABNORMAL
ANION GAP SERPL CALCULATED.3IONS-SCNC: 6 MMOL/L (ref 3–14)
BLD GP AB SCN SERPL QL: ABNORMAL
BLD PROD TYP BPU: ABNORMAL
BLOOD BANK CMNT PATIENT-IMP: ABNORMAL
BLOOD BANK CMNT PATIENT-IMP: ABNORMAL
CHLORIDE SERPL-SCNC: 109 MMOL/L (ref 94–109)
CO2 SERPL-SCNC: 28 MMOL/L (ref 20–32)
GLUCOSE BLDC GLUCOMTR-MCNC: 94 MG/DL (ref 70–99)
HGB BLD-MCNC: 13.2 G/DL (ref 13.3–17.7)
MAGNESIUM SERPL-MCNC: 1.6 MG/DL (ref 1.6–2.3)
NUM BPU REQUESTED: 2
POTASSIUM SERPL-SCNC: 4 MMOL/L (ref 3.4–5.3)
SODIUM SERPL-SCNC: 143 MMOL/L (ref 133–144)
SPECIMEN EXP DATE BLD: ABNORMAL
WBC MAR: 10.3 10E9/L
WBC MAR: 7.1 10E9/L

## 2019-05-14 PROCEDURE — 40000170 ZZH STATISTIC PRE-PROCEDURE ASSESSMENT II: Performed by: INTERNAL MEDICINE

## 2019-05-14 PROCEDURE — 25000128 H RX IP 250 OP 636: Performed by: NURSE ANESTHETIST, CERTIFIED REGISTERED

## 2019-05-14 PROCEDURE — 80051 ELECTROLYTE PANEL: CPT

## 2019-05-14 PROCEDURE — 71000014 ZZH RECOVERY PHASE 1 LEVEL 2 FIRST HR: Performed by: INTERNAL MEDICINE

## 2019-05-14 PROCEDURE — 25000132 ZZH RX MED GY IP 250 OP 250 PS 637: Performed by: ANESTHESIOLOGY

## 2019-05-14 PROCEDURE — 37000009 ZZH ANESTHESIA TECHNICAL FEE, EACH ADDTL 15 MIN: Performed by: INTERNAL MEDICINE

## 2019-05-14 PROCEDURE — 36415 COLL VENOUS BLD VENIPUNCTURE: CPT | Performed by: ANESTHESIOLOGY

## 2019-05-14 PROCEDURE — 36000053 ZZH SURGERY LEVEL 2 EA 15 ADDTL MIN - UMMC: Performed by: INTERNAL MEDICINE

## 2019-05-14 PROCEDURE — 83735 ASSAY OF MAGNESIUM: CPT

## 2019-05-14 PROCEDURE — 25000132 ZZH RX MED GY IP 250 OP 250 PS 637: Performed by: PHYSICIAN ASSISTANT

## 2019-05-14 PROCEDURE — 86870 RBC ANTIBODY IDENTIFICATION: CPT | Performed by: ANESTHESIOLOGY

## 2019-05-14 PROCEDURE — P9041 ALBUMIN (HUMAN),5%, 50ML: HCPCS | Performed by: NURSE ANESTHETIST, CERTIFIED REGISTERED

## 2019-05-14 PROCEDURE — 36000051 ZZH SURGERY LEVEL 2 1ST 30 MIN - UMMC: Performed by: INTERNAL MEDICINE

## 2019-05-14 PROCEDURE — 85018 HEMOGLOBIN: CPT | Performed by: ANESTHESIOLOGY

## 2019-05-14 PROCEDURE — 85048 AUTOMATED LEUKOCYTE COUNT: CPT

## 2019-05-14 PROCEDURE — 25000125 ZZHC RX 250: Performed by: NURSE ANESTHETIST, CERTIFIED REGISTERED

## 2019-05-14 PROCEDURE — 82962 GLUCOSE BLOOD TEST: CPT

## 2019-05-14 PROCEDURE — 25000565 ZZH ISOFLURANE, EA 15 MIN: Performed by: INTERNAL MEDICINE

## 2019-05-14 PROCEDURE — 25800030 ZZH RX IP 258 OP 636: Performed by: ANESTHESIOLOGY

## 2019-05-14 PROCEDURE — 25000128 H RX IP 250 OP 636

## 2019-05-14 PROCEDURE — 37000008 ZZH ANESTHESIA TECHNICAL FEE, 1ST 30 MIN: Performed by: INTERNAL MEDICINE

## 2019-05-14 PROCEDURE — 86880 COOMBS TEST DIRECT: CPT | Performed by: ANESTHESIOLOGY

## 2019-05-14 PROCEDURE — 86970 RBC PRETX INCUBATJ W/CHEMICL: CPT | Performed by: ANESTHESIOLOGY

## 2019-05-14 PROCEDURE — 25800025 ZZH RX 258: Performed by: PHYSICIAN ASSISTANT

## 2019-05-14 PROCEDURE — 27210794 ZZH OR GENERAL SUPPLY STERILE: Performed by: INTERNAL MEDICINE

## 2019-05-14 PROCEDURE — 25800030 ZZH RX IP 258 OP 636: Performed by: NURSE ANESTHETIST, CERTIFIED REGISTERED

## 2019-05-14 RX ORDER — ONDANSETRON 4 MG/1
4 TABLET, ORALLY DISINTEGRATING ORAL EVERY 30 MIN PRN
Status: DISCONTINUED | OUTPATIENT
Start: 2019-05-14 | End: 2019-05-14 | Stop reason: HOSPADM

## 2019-05-14 RX ORDER — LIDOCAINE HYDROCHLORIDE 20 MG/ML
INJECTION, SOLUTION INFILTRATION; PERINEURAL PRN
Status: DISCONTINUED | OUTPATIENT
Start: 2019-05-14 | End: 2019-05-14

## 2019-05-14 RX ORDER — ACYCLOVIR 400 MG/1
400 TABLET ORAL EVERY 12 HOURS
Status: DISCONTINUED | OUTPATIENT
Start: 2019-05-14 | End: 2019-05-15 | Stop reason: HOSPADM

## 2019-05-14 RX ORDER — ACETAMINOPHEN 325 MG/1
325-650 TABLET ORAL EVERY 4 HOURS PRN
Status: DISCONTINUED | OUTPATIENT
Start: 2019-05-14 | End: 2019-05-15 | Stop reason: HOSPADM

## 2019-05-14 RX ORDER — ALBUMIN, HUMAN INJ 5% 5 %
SOLUTION INTRAVENOUS CONTINUOUS PRN
Status: DISCONTINUED | OUTPATIENT
Start: 2019-05-14 | End: 2019-05-14

## 2019-05-14 RX ORDER — ONDANSETRON 2 MG/ML
INJECTION INTRAMUSCULAR; INTRAVENOUS PRN
Status: DISCONTINUED | OUTPATIENT
Start: 2019-05-14 | End: 2019-05-14

## 2019-05-14 RX ORDER — MAGNESIUM SULFATE HEPTAHYDRATE 40 MG/ML
4 INJECTION, SOLUTION INTRAVENOUS EVERY 4 HOURS PRN
Status: DISCONTINUED | OUTPATIENT
Start: 2019-05-14 | End: 2019-05-15 | Stop reason: HOSPADM

## 2019-05-14 RX ORDER — ACETAMINOPHEN 500 MG
1000 TABLET ORAL ONCE
Status: COMPLETED | OUTPATIENT
Start: 2019-05-14 | End: 2019-05-14

## 2019-05-14 RX ORDER — SODIUM CHLORIDE 9 MG/ML
INJECTION, SOLUTION INTRAVENOUS CONTINUOUS
Status: DISCONTINUED | OUTPATIENT
Start: 2019-05-14 | End: 2019-05-14

## 2019-05-14 RX ORDER — PANTOPRAZOLE SODIUM 40 MG/1
40 TABLET, DELAYED RELEASE ORAL DAILY
Status: DISCONTINUED | OUTPATIENT
Start: 2019-05-14 | End: 2019-05-15 | Stop reason: HOSPADM

## 2019-05-14 RX ORDER — PROPOFOL 10 MG/ML
INJECTION, EMULSION INTRAVENOUS CONTINUOUS PRN
Status: DISCONTINUED | OUTPATIENT
Start: 2019-05-14 | End: 2019-05-14

## 2019-05-14 RX ORDER — PROCHLORPERAZINE MALEATE 5 MG
5-10 TABLET ORAL EVERY 6 HOURS PRN
Status: DISCONTINUED | OUTPATIENT
Start: 2019-05-14 | End: 2019-05-15 | Stop reason: HOSPADM

## 2019-05-14 RX ORDER — FENTANYL CITRATE 50 UG/ML
25-50 INJECTION, SOLUTION INTRAMUSCULAR; INTRAVENOUS
Status: CANCELLED | OUTPATIENT
Start: 2019-05-14

## 2019-05-14 RX ORDER — DEXAMETHASONE 4 MG/1
8 TABLET ORAL DAILY
Status: CANCELLED
Start: 2019-05-16

## 2019-05-14 RX ORDER — MEPERIDINE HYDROCHLORIDE 25 MG/ML
25-50 INJECTION INTRAMUSCULAR; INTRAVENOUS; SUBCUTANEOUS
Status: CANCELLED | OUTPATIENT
Start: 2019-05-16

## 2019-05-14 RX ORDER — POTASSIUM CHLORIDE 29.8 MG/ML
20 INJECTION INTRAVENOUS
Status: DISCONTINUED | OUTPATIENT
Start: 2019-05-14 | End: 2019-05-15 | Stop reason: HOSPADM

## 2019-05-14 RX ORDER — POTASSIUM CHLORIDE 750 MG/1
20-40 TABLET, EXTENDED RELEASE ORAL
Status: DISCONTINUED | OUTPATIENT
Start: 2019-05-14 | End: 2019-05-15 | Stop reason: HOSPADM

## 2019-05-14 RX ORDER — ACETAMINOPHEN 325 MG/1
650 TABLET ORAL ONCE
Status: CANCELLED
Start: 2019-05-16

## 2019-05-14 RX ORDER — POTASSIUM CHLORIDE 7.45 MG/ML
10 INJECTION INTRAVENOUS
Status: DISCONTINUED | OUTPATIENT
Start: 2019-05-14 | End: 2019-05-15 | Stop reason: HOSPADM

## 2019-05-14 RX ORDER — HEPARIN SODIUM,PORCINE 10 UNIT/ML
5-10 VIAL (ML) INTRAVENOUS
Status: DISCONTINUED | OUTPATIENT
Start: 2019-05-14 | End: 2019-05-15 | Stop reason: HOSPADM

## 2019-05-14 RX ORDER — LIDOCAINE 40 MG/G
CREAM TOPICAL
Status: DISCONTINUED | OUTPATIENT
Start: 2019-05-14 | End: 2019-05-14 | Stop reason: HOSPADM

## 2019-05-14 RX ORDER — PROPOFOL 10 MG/ML
INJECTION, EMULSION INTRAVENOUS PRN
Status: DISCONTINUED | OUTPATIENT
Start: 2019-05-14 | End: 2019-05-14

## 2019-05-14 RX ORDER — LORATADINE 10 MG/1
10 TABLET ORAL DAILY
Status: DISCONTINUED | OUTPATIENT
Start: 2019-05-15 | End: 2019-05-15 | Stop reason: HOSPADM

## 2019-05-14 RX ORDER — SODIUM CHLORIDE 9 MG/ML
INJECTION, SOLUTION INTRAVENOUS CONTINUOUS
Status: CANCELLED
Start: 2019-05-15

## 2019-05-14 RX ORDER — ALLOPURINOL 300 MG/1
300 TABLET ORAL ONCE
Status: CANCELLED
Start: 2019-05-15

## 2019-05-14 RX ORDER — OXYCODONE HYDROCHLORIDE 5 MG/1
5-10 TABLET ORAL EVERY 4 HOURS PRN
Status: DISCONTINUED | OUTPATIENT
Start: 2019-05-14 | End: 2019-05-15 | Stop reason: HOSPADM

## 2019-05-14 RX ORDER — POTASSIUM CHLORIDE 1.5 G/1.58G
20-40 POWDER, FOR SOLUTION ORAL
Status: DISCONTINUED | OUTPATIENT
Start: 2019-05-14 | End: 2019-05-15 | Stop reason: HOSPADM

## 2019-05-14 RX ORDER — NALOXONE HYDROCHLORIDE 0.4 MG/ML
.1-.4 INJECTION, SOLUTION INTRAMUSCULAR; INTRAVENOUS; SUBCUTANEOUS
Status: DISCONTINUED | OUTPATIENT
Start: 2019-05-14 | End: 2019-05-14 | Stop reason: HOSPADM

## 2019-05-14 RX ORDER — HYDRALAZINE HYDROCHLORIDE 20 MG/ML
2.5-5 INJECTION INTRAMUSCULAR; INTRAVENOUS EVERY 10 MIN PRN
Status: DISCONTINUED | OUTPATIENT
Start: 2019-05-14 | End: 2019-05-14 | Stop reason: HOSPADM

## 2019-05-14 RX ORDER — NALOXONE HYDROCHLORIDE 0.4 MG/ML
.1-.4 INJECTION, SOLUTION INTRAMUSCULAR; INTRAVENOUS; SUBCUTANEOUS
Status: DISCONTINUED | OUTPATIENT
Start: 2019-05-14 | End: 2019-05-15 | Stop reason: HOSPADM

## 2019-05-14 RX ORDER — ONDANSETRON 8 MG/1
8 TABLET, FILM COATED ORAL EVERY 8 HOURS
Status: CANCELLED
Start: 2019-05-15

## 2019-05-14 RX ORDER — HEPARIN SODIUM,PORCINE 10 UNIT/ML
5-10 VIAL (ML) INTRAVENOUS EVERY 24 HOURS
Status: DISCONTINUED | OUTPATIENT
Start: 2019-05-14 | End: 2019-05-15 | Stop reason: HOSPADM

## 2019-05-14 RX ORDER — DIPHENHYDRAMINE HCL 25 MG
25 CAPSULE ORAL ONCE
Status: CANCELLED
Start: 2019-05-16

## 2019-05-14 RX ORDER — FENTANYL CITRATE 50 UG/ML
25-50 INJECTION, SOLUTION INTRAMUSCULAR; INTRAVENOUS EVERY 5 MIN PRN
Status: DISCONTINUED | OUTPATIENT
Start: 2019-05-14 | End: 2019-05-14 | Stop reason: HOSPADM

## 2019-05-14 RX ORDER — POTASSIUM CL/LIDO/0.9 % NACL 10MEQ/0.1L
10 INTRAVENOUS SOLUTION, PIGGYBACK (ML) INTRAVENOUS
Status: DISCONTINUED | OUTPATIENT
Start: 2019-05-14 | End: 2019-05-15 | Stop reason: HOSPADM

## 2019-05-14 RX ORDER — DEXAMETHASONE SODIUM PHOSPHATE 4 MG/ML
INJECTION, SOLUTION INTRA-ARTICULAR; INTRALESIONAL; INTRAMUSCULAR; INTRAVENOUS; SOFT TISSUE PRN
Status: DISCONTINUED | OUTPATIENT
Start: 2019-05-14 | End: 2019-05-14

## 2019-05-14 RX ORDER — FENTANYL CITRATE 50 UG/ML
INJECTION, SOLUTION INTRAMUSCULAR; INTRAVENOUS PRN
Status: DISCONTINUED | OUTPATIENT
Start: 2019-05-14 | End: 2019-05-14

## 2019-05-14 RX ORDER — PAROXETINE 20 MG/1
20 TABLET, FILM COATED ORAL EVERY MORNING
Status: DISCONTINUED | OUTPATIENT
Start: 2019-05-15 | End: 2019-05-15 | Stop reason: HOSPADM

## 2019-05-14 RX ORDER — LABETALOL 20 MG/4 ML (5 MG/ML) INTRAVENOUS SYRINGE
10
Status: DISCONTINUED | OUTPATIENT
Start: 2019-05-14 | End: 2019-05-14 | Stop reason: HOSPADM

## 2019-05-14 RX ORDER — HYDROMORPHONE HYDROCHLORIDE 1 MG/ML
.3-.5 INJECTION, SOLUTION INTRAMUSCULAR; INTRAVENOUS; SUBCUTANEOUS EVERY 10 MIN PRN
Status: DISCONTINUED | OUTPATIENT
Start: 2019-05-14 | End: 2019-05-14 | Stop reason: HOSPADM

## 2019-05-14 RX ORDER — ONDANSETRON 2 MG/ML
4 INJECTION INTRAMUSCULAR; INTRAVENOUS EVERY 30 MIN PRN
Status: DISCONTINUED | OUTPATIENT
Start: 2019-05-14 | End: 2019-05-14 | Stop reason: HOSPADM

## 2019-05-14 RX ORDER — SODIUM CHLORIDE, SODIUM LACTATE, POTASSIUM CHLORIDE, CALCIUM CHLORIDE 600; 310; 30; 20 MG/100ML; MG/100ML; MG/100ML; MG/100ML
INJECTION, SOLUTION INTRAVENOUS CONTINUOUS
Status: DISCONTINUED | OUTPATIENT
Start: 2019-05-14 | End: 2019-05-14 | Stop reason: HOSPADM

## 2019-05-14 RX ADMIN — PHENYLEPHRINE HYDROCHLORIDE 100 MCG: 10 INJECTION, SOLUTION INTRAMUSCULAR; INTRAVENOUS; SUBCUTANEOUS at 12:25

## 2019-05-14 RX ADMIN — DEXAMETHASONE SODIUM PHOSPHATE 8 MG: 4 INJECTION, SOLUTION INTRA-ARTICULAR; INTRALESIONAL; INTRAMUSCULAR; INTRAVENOUS; SOFT TISSUE at 12:19

## 2019-05-14 RX ADMIN — FENTANYL CITRATE 100 MCG: 50 INJECTION, SOLUTION INTRAMUSCULAR; INTRAVENOUS at 11:53

## 2019-05-14 RX ADMIN — PHENYLEPHRINE HYDROCHLORIDE 100 MCG: 10 INJECTION, SOLUTION INTRAMUSCULAR; INTRAVENOUS; SUBCUTANEOUS at 12:31

## 2019-05-14 RX ADMIN — SODIUM CHLORIDE, POTASSIUM CHLORIDE, SODIUM LACTATE AND CALCIUM CHLORIDE: 600; 310; 30; 20 INJECTION, SOLUTION INTRAVENOUS at 11:52

## 2019-05-14 RX ADMIN — ROCURONIUM BROMIDE 50 MG: 10 INJECTION INTRAVENOUS at 11:58

## 2019-05-14 RX ADMIN — PHENYLEPHRINE HYDROCHLORIDE 100 MCG: 10 INJECTION, SOLUTION INTRAMUSCULAR; INTRAVENOUS; SUBCUTANEOUS at 12:10

## 2019-05-14 RX ADMIN — PHENYLEPHRINE HYDROCHLORIDE 100 MCG: 10 INJECTION, SOLUTION INTRAMUSCULAR; INTRAVENOUS; SUBCUTANEOUS at 12:13

## 2019-05-14 RX ADMIN — PHENYLEPHRINE HYDROCHLORIDE 100 MCG: 10 INJECTION, SOLUTION INTRAMUSCULAR; INTRAVENOUS; SUBCUTANEOUS at 12:57

## 2019-05-14 RX ADMIN — ALBUMIN HUMAN: 0.05 INJECTION, SOLUTION INTRAVENOUS at 12:32

## 2019-05-14 RX ADMIN — PROPOFOL 90 MG: 10 INJECTION, EMULSION INTRAVENOUS at 11:56

## 2019-05-14 RX ADMIN — ACYCLOVIR 400 MG: 400 TABLET ORAL at 18:00

## 2019-05-14 RX ADMIN — MIDAZOLAM 2 MG: 1 INJECTION INTRAMUSCULAR; INTRAVENOUS at 11:52

## 2019-05-14 RX ADMIN — ALBUMIN HUMAN: 0.05 INJECTION, SOLUTION INTRAVENOUS at 12:52

## 2019-05-14 RX ADMIN — ACETAMINOPHEN 1000 MG: 500 TABLET, FILM COATED ORAL at 11:20

## 2019-05-14 RX ADMIN — ROCURONIUM BROMIDE 10 MG: 10 INJECTION INTRAVENOUS at 12:47

## 2019-05-14 RX ADMIN — LIDOCAINE HYDROCHLORIDE 100 MG: 20 INJECTION, SOLUTION INFILTRATION; PERINEURAL at 11:54

## 2019-05-14 RX ADMIN — PHENYLEPHRINE HYDROCHLORIDE 100 MCG: 10 INJECTION, SOLUTION INTRAMUSCULAR; INTRAVENOUS; SUBCUTANEOUS at 13:28

## 2019-05-14 RX ADMIN — PHENYLEPHRINE HYDROCHLORIDE 100 MCG: 10 INJECTION, SOLUTION INTRAMUSCULAR; INTRAVENOUS; SUBCUTANEOUS at 13:07

## 2019-05-14 RX ADMIN — ONDANSETRON 4 MG: 2 INJECTION INTRAMUSCULAR; INTRAVENOUS at 13:01

## 2019-05-14 RX ADMIN — SODIUM CHLORIDE, POTASSIUM CHLORIDE, SODIUM LACTATE AND CALCIUM CHLORIDE: 600; 310; 30; 20 INJECTION, SOLUTION INTRAVENOUS at 13:24

## 2019-05-14 RX ADMIN — SARGRAMOSTIM 1000 MCG: 250 INJECTION, POWDER, FOR SOLUTION INTRAVENOUS; SUBCUTANEOUS at 11:44

## 2019-05-14 RX ADMIN — ALBUMIN HUMAN: 0.05 INJECTION, SOLUTION INTRAVENOUS at 12:45

## 2019-05-14 RX ADMIN — PHENYLEPHRINE HYDROCHLORIDE 100 MCG: 10 INJECTION, SOLUTION INTRAMUSCULAR; INTRAVENOUS; SUBCUTANEOUS at 13:06

## 2019-05-14 RX ADMIN — PROPOFOL 100 MCG/KG/MIN: 10 INJECTION, EMULSION INTRAVENOUS at 11:56

## 2019-05-14 RX ADMIN — DEXTROSE AND SODIUM CHLORIDE 1000 ML: 5; 450 INJECTION, SOLUTION INTRAVENOUS at 22:40

## 2019-05-14 ASSESSMENT — ANXIETY QUESTIONNAIRES: GAD7 TOTAL SCORE: 3

## 2019-05-14 ASSESSMENT — MIFFLIN-ST. JEOR: SCORE: 1636.38

## 2019-05-14 NOTE — ANESTHESIA CARE TRANSFER NOTE
Patient: Angel Yanez    Procedure(s):  Bone Marrow [place]    Diagnosis: Multiple Myeloma  Diagnosis Additional Information: No value filed.    Anesthesia Type:   No value filed.     Note:  Airway :Face Mask  Patient transferred to:PACU  Handoff Report: Identifed the Patient, Identified the Reponsible Provider, Reviewed the pertinent medical history, Discussed the surgical course, Reviewed Intra-OP anesthesia mangement and issues during anesthesia, Set expectations for post-procedure period and Allowed opportunity for questions and acknowledgement of understanding      Vitals: (Last set prior to Anesthesia Care Transfer)    CRNA VITALS  5/14/2019 1315 - 5/14/2019 1358      5/14/2019             Resp Rate (observed):  3  (Abnormal)     Resp Rate (set):  10                Electronically Signed By: BONITA Pereira CRNA  May 14, 2019  1:58 PM

## 2019-05-14 NOTE — ANESTHESIA POSTPROCEDURE EVALUATION
Anesthesia POST Procedure Evaluation    Patient: Angel Yanez   MRN:     [id number] Gender:   male   Age:    58 year old :      1960        Preoperative Diagnosis: Multiple Myeloma   Procedure(s):  Bone Marrow [place]   Postop Comments: No value filed.       Anesthesia Type:  General  No value filed.    Reportable Event: NO     PAIN: Uncomplicated   Sign Out status: Comfortable, Well controlled pain     PONV: No PONV   Sign Out status:  No Nausea or Vomiting     Neuro/Psych: Uneventful perioperative course   Sign Out Status: Preoperative baseline; Age appropriate mentation     Airway/Resp.: Uneventful perioperative course   Sign Out Status: Non labored breathing, age appropriate RR; Resp. Status within EXPECTED Parameters     CV: Uneventful perioperative course   Sign Out status: Appropriate BP and perfusion indices; Appropriate HR/Rhythm     Disposition:   Sign Out in:  PACU  Disposition:  Phase II; Home  Recovery Course: Uneventful  Follow-Up: Not required           Last Anesthesia Record Vitals:  CRNA VITALS  2019 1315 - 2019 1415      2019             Resp Rate (observed):  3  (Abnormal)     Resp Rate (set):  10          Last PACU Vitals:  Vitals Value Taken Time   /72 2019  2:10 PM   Temp 36.1  C (97  F) 2019  1:50 PM   Pulse 85 2019  2:10 PM   Resp 12 2019  1:50 PM   SpO2 97 % 2019  2:15 PM   Temp src     NIBP     Pulse     SpO2     Resp     Temp     Ht Rate     Temp 2     Vitals shown include unvalidated device data.      Electronically Signed By: Angel Frey MD, May 14, 2019, 2:16 PM

## 2019-05-14 NOTE — PROGRESS NOTES
Attending Summary  History and physical  Angel Yanez is a 58 year old male with MM s/p ASCT in 2005, followed by recent relapse, treated with VRD followed by Hiral/Tip/Dex.  Chemo-priming was attempted in 1/2019, but collection was deferred due to residual disease.  He is now s/p autologous marrow harvest.  We are awaiting harvest yield.  If adequate, line placement and conditioning with melphalan will begin tomorrow.    SHx:  Never smoker.  PMHx:  Psoriatic arthritis, osteoporosis, hyperlipidemia.   FMHx:  Mother with diabetes.    On exam:  Head/mouth/neck: Oropharynx clear.  No lymphadenopathy.  Heart: Regular rate and rhythm.  No murmurs rubs or gallops.  Lungs: Lungs are clear bilaterally.  Abdomen: Abdomen is soft nontender nondistended.  Intact bowel sounds.  Ext: No edema.  Skin: No rash.  Camden site bandaged.    Pertinent Labs:  Reviewed in full.    ASSESSMENT AND PLAN  1.  MM:  Awaiting harvest yield.  If adequate, line placement and conditioning with melphalan will begin tomorrow.  2.  Pain:  Camden site pain controlled.    Anuel Lanier MD

## 2019-05-14 NOTE — ANESTHESIA PREPROCEDURE EVALUATION
Anesthesia Pre-Procedure Evaluation    Patient: Angel Yanez   MRN:     [id number] Gender:   male   Age:    58 year old :      1960        Preoperative Diagnosis: Multiple Myeloma   Procedure(s):  Bone Marrow [place]     Past Medical History:   Diagnosis Date     GERD (gastroesophageal reflux disease)      H/O autologous stem cell transplant (H) 2005     Hyperlipidemia      Multiple myeloma (H)      PONV (postoperative nausea and vomiting)      Psoriasis      Psoriatic arthritis (H)       Past Surgical History:   Procedure Laterality Date     ARTHROPLASTY HIP       COLONOSCOPY       HERNIA REPAIR       IR CVC TUNNEL PLACEMENT > 5 YRS OF AGE  2019     IR CVC TUNNEL REMOVAL LEFT  2019     IR FOLLOW UP VISIT OUTPATIENT  2019     ORTHOPEDIC SURGERY       TRANSPLANT            Anesthesia Evaluation     . Pt has had prior anesthetic.     History of anesthetic complications   - PONV        ROS/MED HX    ENT/Pulmonary:  - neg pulmonary ROS     Neurologic:  - neg neurologic ROS     Cardiovascular:  - neg cardiovascular ROS   (+) ----. : . . . :. . Previous cardiac testing Echodate:results:Interpretation Summary  Left and right ventricular function, chamber size, wall motion, and wall  thickness are normal.The LVEF is 55%.  Global peak LV longitudinal strain is averaged at -18.9%. This is within  reported normal limits (normal <-18%). The clinical value of assessment of  global peak longitudinal strain in the surveillance for cardiotoxicity lies in  serial measurements.  No significant valvular abnormalities were noted.  This study was compared with the study from 19: There has been no change.date: results: date: results: date: results:          METS/Exercise Tolerance:     Hematologic:  - neg hematologic  ROS       Musculoskeletal:         GI/Hepatic:     (+) GERD Asymptomatic on medication,       Renal/Genitourinary:  - ROS Renal section negative       Endo:     (+) Chronic steroid  "usage for .      Psychiatric:     (+) psychiatric history depression      Infectious Disease:  - neg infectious disease ROS       Malignancy:   (+) Malignancy History of Other  Other CA multiple myeloma Remission status post         Other:                         PHYSICAL EXAM:   Mental Status/Neuro: A/A/O   Airway: Facies: Feasible   Respiratory:   Resp. Effort: Normal      CV:   Rate: Age appropriate   Comments:                    Lab Results   Component Value Date    WBC 7.6 05/13/2019    HGB 13.2 (L) 05/14/2019    HCT 40.8 05/13/2019     05/13/2019    CRP 9.5 (H) 02/14/2007     05/13/2019    POTASSIUM 4.0 05/13/2019    CHLORIDE 106 05/13/2019    CO2 27 05/13/2019    BUN 14 05/13/2019    CR 0.98 05/13/2019    GLC 89 05/13/2019    ZHEN 8.9 05/13/2019    PHOS 2.3 (L) 05/06/2019    MAG 2.3 05/06/2019    ALBUMIN 3.7 05/13/2019    PROTTOTAL 6.8 05/13/2019    ALT 17 05/13/2019    AST <3 05/13/2019    ALKPHOS 63 05/13/2019    BILITOTAL 0.2 05/13/2019    PTT 35 05/06/2019    INR 1.07 05/13/2019    TSH 0.29 (L) 02/14/2007    T4 1.22 02/14/2007       Preop Vitals  BP Readings from Last 3 Encounters:   05/14/19 (!) 143/95   05/13/19 122/89   05/13/19 122/89    Pulse Readings from Last 3 Encounters:   05/14/19 77   05/13/19 83   05/13/19 83      Resp Readings from Last 3 Encounters:   05/14/19 18   05/13/19 18   05/13/19 18    SpO2 Readings from Last 3 Encounters:   05/14/19 100%   05/13/19 98%   05/13/19 98%      Temp Readings from Last 1 Encounters:   05/14/19 36.7  C (98.1  F) (Oral)    Ht Readings from Last 1 Encounters:   05/14/19 1.753 m (5' 9\")      Wt Readings from Last 1 Encounters:   05/14/19 83.6 kg (184 lb 4.9 oz)    Estimated body mass index is 27.22 kg/m  as calculated from the following:    Height as of this encounter: 1.753 m (5' 9\").    Weight as of this encounter: 83.6 kg (184 lb 4.9 oz).     LDA:            Assessment:   ASA SCORE: 2       Documentation: H&P complete; Preop Testing complete; " Consents complete   Proceeding: Proceed without further delay  Tobacco Use:  NO Active use of Tobacco/UNKNOWN Tobacco use status     Plan:   Anes. Type:  General   Pre-Induction: Acetaminophen PO   Induction:  IV (Standard)   Airway: Oral ETT   Access/Monitoring: PIV   Maintenance: Balanced   Emergence: Procedure Site   Logistics: Same Day Surgery     Postop Pain/Sedation Strategy:  Standard-Options: Opioids PRN     PONV Management:  Adult Risk Factors:, H/o PONV or Motion Sickness, Non-Smoker, Postop Opioids  Prevention: Ondansetron; Dexamethasone     CONSENT: Direct conversation   Plan and risks discussed with: Patient                            Angel Frey MD

## 2019-05-14 NOTE — PROCEDURES
BMT Bone Marrow [place] Procedure Note  May 14, 2019 2:23 PM    PROCEDURE: Autologous bone marrow harvest.       PRE-OPERATIVE DIAGNOSIS: Bone marrow donor      SURGEON: Dr. Crowley, Dr. Erickson, Robert Haskins        Previously signed consent was verified:.  Under general anesthesia, the patient was placed in the prone position and the bilateral iliac crests were identified.  The area over these crests was prepped and draped in a sterile fashion.   Approximately 1500 mL of bone marrow was aspirated from bilateral iliac crest sites.  After the procedure, a sterile dressing was applied to each site.      Estimated blood loss was 1500.    During the procedure, the patient received 1500 lactated ringers and 750 ml of 5% albumin.     Complications: None    After the procedure, the patient was taken to the recovery room.      Ayah Crowley MD

## 2019-05-14 NOTE — PLAN OF CARE
S/P autologous bone marrow harvest. History of multiple myeloma. Pt arrived to 5C at 3pm. A & O x4, Bilateral posterior iliac crests with pressure dressing c/d/I. No c/o pain. VSS. RA. Capnography. D5 1/2 NS infusing at 125ml/hr. Voiding not saving.Tolerating diet. Up with SBA.

## 2019-05-14 NOTE — PROGRESS NOTES
Assisted with harvest of bone marrow from bilateral posterior iliac crests.  EBL=1500cc with estimated nucleated cell count of > or = to 1.6 x 10&8 nc/kg .  Marrow filtered at back table and taken to Blood Bank at 1400 prior to transport to  Cell Processing lab  Marrow product #  [id number]
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
## 2019-05-15 ENCOUNTER — HOSPITAL ENCOUNTER (OUTPATIENT)
Age: 59
DRG: 016 | End: 2019-05-15
Attending: INTERNAL MEDICINE | Admitting: INTERNAL MEDICINE
Payer: COMMERCIAL

## 2019-05-15 ENCOUNTER — TELEPHONE (OUTPATIENT)
Dept: INTERVENTIONAL RADIOLOGY/VASCULAR | Facility: CLINIC | Age: 59
End: 2019-05-15

## 2019-05-15 VITALS
BODY MASS INDEX: 26.87 KG/M2 | HEART RATE: 95 BPM | OXYGEN SATURATION: 98 % | WEIGHT: 181.44 LBS | TEMPERATURE: 98.3 F | DIASTOLIC BLOOD PRESSURE: 80 MMHG | RESPIRATION RATE: 16 BRPM | SYSTOLIC BLOOD PRESSURE: 120 MMHG | HEIGHT: 69 IN

## 2019-05-15 DIAGNOSIS — C90.01 MULTIPLE MYELOMA IN REMISSION (H): Primary | ICD-10-CM

## 2019-05-15 LAB
ANION GAP SERPL CALCULATED.3IONS-SCNC: 6 MMOL/L (ref 3–14)
ANISOCYTOSIS BLD QL SMEAR: SLIGHT
BASOPHILS # BLD AUTO: 0 10E9/L (ref 0–0.2)
BASOPHILS NFR BLD AUTO: 0 %
BUN SERPL-MCNC: 11 MG/DL (ref 7–30)
CALCIUM SERPL-MCNC: 8.5 MG/DL (ref 8.5–10.1)
CHLORIDE SERPL-SCNC: 110 MMOL/L (ref 94–109)
CO2 SERPL-SCNC: 27 MMOL/L (ref 20–32)
CREAT SERPL-MCNC: 0.89 MG/DL (ref 0.66–1.25)
DIFFERENTIAL METHOD BLD: ABNORMAL
EOSINOPHIL # BLD AUTO: 0.4 10E9/L (ref 0–0.7)
EOSINOPHIL NFR BLD AUTO: 2.6 %
ERYTHROCYTE [DISTWIDTH] IN BLOOD BY AUTOMATED COUNT: 13.1 % (ref 10–15)
GFR SERPL CREATININE-BSD FRML MDRD: >90 ML/MIN/{1.73_M2}
GLUCOSE SERPL-MCNC: 142 MG/DL (ref 70–99)
HCT VFR BLD AUTO: 27.1 % (ref 40–53)
HGB BLD-MCNC: 8.5 G/DL (ref 13.3–17.7)
LYMPHOCYTES # BLD AUTO: 1.1 10E9/L (ref 0.8–5.3)
LYMPHOCYTES NFR BLD AUTO: 7.9 %
MAGNESIUM SERPL-MCNC: 1.9 MG/DL (ref 1.6–2.3)
MCH RBC QN AUTO: 34 PG (ref 26.5–33)
MCHC RBC AUTO-ENTMCNC: 31.4 G/DL (ref 31.5–36.5)
MCV RBC AUTO: 108 FL (ref 78–100)
METAMYELOCYTES # BLD: 0.1 10E9/L
METAMYELOCYTES NFR BLD MANUAL: 0.9 %
MONOCYTES # BLD AUTO: 0.5 10E9/L (ref 0–1.3)
MONOCYTES NFR BLD AUTO: 3.5 %
NEUTROPHILS # BLD AUTO: 12.1 10E9/L (ref 1.6–8.3)
NEUTROPHILS NFR BLD AUTO: 85.1 %
OVALOCYTES BLD QL SMEAR: SLIGHT
PLATELET # BLD AUTO: 101 10E9/L (ref 150–450)
PLATELET # BLD EST: ABNORMAL 10*3/UL
POIKILOCYTOSIS BLD QL SMEAR: SLIGHT
POTASSIUM SERPL-SCNC: 3.8 MMOL/L (ref 3.4–5.3)
RBC # BLD AUTO: 2.5 10E12/L (ref 4.4–5.9)
SODIUM SERPL-SCNC: 143 MMOL/L (ref 133–144)
WBC # BLD AUTO: 14.2 10E9/L (ref 4–11)

## 2019-05-15 PROCEDURE — 80048 BASIC METABOLIC PNL TOTAL CA: CPT | Performed by: INTERNAL MEDICINE

## 2019-05-15 PROCEDURE — 25000132 ZZH RX MED GY IP 250 OP 250 PS 637: Performed by: PHYSICIAN ASSISTANT

## 2019-05-15 PROCEDURE — 25800025 ZZH RX 258: Performed by: PHYSICIAN ASSISTANT

## 2019-05-15 PROCEDURE — 85025 COMPLETE CBC W/AUTO DIFF WBC: CPT | Performed by: INTERNAL MEDICINE

## 2019-05-15 PROCEDURE — 38207 CRYOPRESERVE STEM CELLS: CPT | Performed by: INTERNAL MEDICINE

## 2019-05-15 PROCEDURE — 36415 COLL VENOUS BLD VENIPUNCTURE: CPT | Performed by: INTERNAL MEDICINE

## 2019-05-15 PROCEDURE — 83735 ASSAY OF MAGNESIUM: CPT | Performed by: INTERNAL MEDICINE

## 2019-05-15 RX ORDER — DEXAMETHASONE 4 MG/1
8 TABLET ORAL DAILY
Status: DISCONTINUED | OUTPATIENT
Start: 2019-05-16 | End: 2019-05-15 | Stop reason: HOSPADM

## 2019-05-15 RX ORDER — ALLOPURINOL 300 MG/1
300 TABLET ORAL ONCE
Status: DISCONTINUED | OUTPATIENT
Start: 2019-05-15 | End: 2019-05-15 | Stop reason: HOSPADM

## 2019-05-15 RX ORDER — DIPHENHYDRAMINE HCL 25 MG
25 CAPSULE ORAL ONCE
Status: DISCONTINUED | OUTPATIENT
Start: 2019-05-16 | End: 2019-05-15 | Stop reason: HOSPADM

## 2019-05-15 RX ORDER — ONDANSETRON 8 MG/1
8 TABLET, FILM COATED ORAL EVERY 8 HOURS
Status: DISCONTINUED | OUTPATIENT
Start: 2019-05-15 | End: 2019-05-15 | Stop reason: HOSPADM

## 2019-05-15 RX ORDER — CEFAZOLIN SODIUM 2 G/100ML
2 INJECTION, SOLUTION INTRAVENOUS
Status: DISCONTINUED | OUTPATIENT
Start: 2019-05-15 | End: 2019-05-15 | Stop reason: HOSPADM

## 2019-05-15 RX ORDER — SODIUM CHLORIDE 9 MG/ML
INJECTION, SOLUTION INTRAVENOUS CONTINUOUS
Status: DISCONTINUED | OUTPATIENT
Start: 2019-05-15 | End: 2019-05-15 | Stop reason: HOSPADM

## 2019-05-15 RX ORDER — ACETAMINOPHEN 325 MG/1
650 TABLET ORAL ONCE
Status: DISCONTINUED | OUTPATIENT
Start: 2019-05-16 | End: 2019-05-15 | Stop reason: HOSPADM

## 2019-05-15 RX ORDER — MEPERIDINE HYDROCHLORIDE 25 MG/ML
25-50 INJECTION INTRAMUSCULAR; INTRAVENOUS; SUBCUTANEOUS
Status: DISCONTINUED | OUTPATIENT
Start: 2019-05-16 | End: 2019-05-15 | Stop reason: HOSPADM

## 2019-05-15 RX ADMIN — DEXTROSE AND SODIUM CHLORIDE 1000 ML: 5; 450 INJECTION, SOLUTION INTRAVENOUS at 06:04

## 2019-05-15 RX ADMIN — ACYCLOVIR 400 MG: 400 TABLET ORAL at 06:08

## 2019-05-15 RX ADMIN — PANTOPRAZOLE SODIUM 40 MG: 40 TABLET, DELAYED RELEASE ORAL at 08:20

## 2019-05-15 RX ADMIN — PAROXETINE HYDROCHLORIDE HEMIHYDRATE 20 MG: 20 TABLET, FILM COATED ORAL at 08:20

## 2019-05-15 RX ADMIN — LORATADINE 10 MG: 10 TABLET ORAL at 08:20

## 2019-05-15 ASSESSMENT — MIFFLIN-ST. JEOR
SCORE: 1624.87
SCORE: 1622.3

## 2019-05-15 NOTE — PROGRESS NOTES
Attending Summary  History and physical  Angel Yanez is a 58 year old male with MM s/p ASCT in 2005, followed by recent relapse, treated with VRD followed by Hiral/Tip/Dex.  Chemo-priming was attempted in 1/2019, but collection was deferred due to residual disease.  He is now s/p autologous marrow harvest.      [place] yielded a CD34 of 0.6x10^6.     SHx:  Never smoker.  PMHx:  Psoriatic arthritis, osteoporosis, hyperlipidemia.   FMHx:  Mother with diabetes.     On exam:  Head/mouth/neck: Oropharynx clear.  No lymphadenopathy.  Heart: Regular rate and rhythm.  No murmurs rubs or gallops.  Lungs: Lungs are clear bilaterally.  Abdomen: Abdomen is soft nontender nondistended.  Intact bowel sounds.  Ext: No edema.  Skin: No rash.  [place] site bandaged.     Pertinent Labs:  Reviewed in full.     ASSESSMENT AND PLAN  1.  MM: Awaiting line placement, will discharge and formally schedule line procedure.  Will proceed with ASCT, using Allyson 140 to avoid prolonged cytopenias.  Discussed risks and benefits of ASCT, and will proceed.  2.  Pain:  [place] site pain controlled.     Anuel Lanier MD

## 2019-05-15 NOTE — PROGRESS NOTES
Pt discharged to: home  Via: personal vehicle  Accompanied by: son  Belongings: with patient  Teaching: reviewed medications/follow up  Clinic appointment: Patient will be called with appointment date/time for line placement and readmission for transplant.
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
## 2019-05-15 NOTE — SUMMARY OF CARE
BMT Summary of Care    This note has data from a flowsheet    May 15, 2019 10:48 AM  Angel Yanez  MRN: [id number]    Discharge Date: 5/15/19    BMT Primary Physician: Dr. Lucio    BMT Nurse Coordinator: Su Pate    Discharge Diagnosis: S/P admission for stem cell harvest    Discharge To: home/BMT clinic    Activity: as tolerated    Catheter Care: None         IV Medications through home infusion: None    Nutrition: Regular diet as tolerated    Blood Transfusions:  Transfuse if Hemoglobin < or equal 8 mg/dL  Red Blood Cell Order: 2 units, irradiated and leukoreduced   Transfuse if Platelet count < or equal 10 uL  Platelet order: 1 adult dose, irradiated and leukoreduced       Intravenous Electrolyte Replacement:  Potassium  chloride (give only if serum creatinine < 2)     3-3.3  20mEq/hr  over 1 hour x 2 doses     <3      20mEq/hr  over 1 hour x 3 doses  Magnesium sulfate 1.3-1.7     2 grams over 1 hour x1 dose                                     <1.3         2 grams over 2 hours x1 dose         Plan: admit tomorrow for central line placement 5/16/19 and transplant-will be notified by outpatient nurse coordinator.     Guille Yanez   Home Medication Instructions [id number]    Printed on:05/15/19 1051   Medication Information                      acetaminophen (TYLENOL) 325 MG tablet  Take 1-2 tablets (325-650 mg) by mouth every 4 hours as needed for mild pain             acyclovir (ZOVIRAX) 400 MG tablet  Take 400 mg by mouth every 12 hours              calcipotriene (DOVONOX) 0.005 % SOLN solution  Apply topically 2 times daily as needed              calcium carbonate (CALCIUM CARBONATE) 600 MG tablet  Take 2 tablets by mouth daily              Cholecalciferol (VITAMIN D3) 2000 units CAPS  Take 2,000 Units by mouth daily              loratadine (CLARITIN) 10 MG tablet  Take 10 mg by mouth daily             omeprazole (PRILOSEC OTC) 20 MG tablet  Take 20 mg by mouth daily              PARoxetine (PAXIL)  20 MG tablet  Take 20 mg by mouth every morning             prednisoLONE acetate (PRED FORTE) 1 % ophthalmic susp  Place 1-2 drops into both eyes every 4 hours as needed for dry eyes                  BONITA Kwan CNP

## 2019-05-15 NOTE — CONSULTS
Patient is on IR schedule 5/15/2019 for an image guided placement of double lumen, tunneled, CVC.   Labs WNL for procedure.   Orders for NPO, scrubs and antibiotics have been entered.   Consent will be done prior to procedure.     Please contact the IR charge RN at [phone] for estimated time of procedure.     Case discussed with Dr. Raymundo from IR and BONITA Kirkpatrick. This is a 58 year old male admitted following bone marrow harvest prior to possible 2nd auto BMT for MM. Pt requires CVC placement for medications and chemotherapy infusion. DOES NOT need to be apheresis capable.    Hilary Berry DNP, APRN  Interventional Radiology  Pager: [phone]

## 2019-05-15 NOTE — PLAN OF CARE
"Blood pressure 102/65, pulse 80, temperature 97.7  F (36.5  C), temperature source Axillary, resp. rate 16, height 1.753 m (5' 9\"), weight 83.6 kg (184 lb 4.9 oz), SpO2 95 %.  Pt is s/p Autologous Bone Jaramillo harvest. Harvested sites C/D/I. No drainage noted. Pt denies any pain/N/V/D. A/O x 4. Up independently to the bathroom. MIVF D1/2NS at 125ML/hr. Pt is voiding good. Pt awaiting harvest yield and if OK then pt will be having a Central line placed with conditional Melphalan starting today. Pt is NPO after midnight. He took his Acyclovir with about 20 ML of water, just to get med down. Continue to monitor pt and continue with plan of care.  Problem: Adult Inpatient Plan of Care  Goal: Plan of Care Review  Outcome: No Change     Problem: Adult Inpatient Plan of Care  Goal: Patient-Specific Goal (Individualization)  Outcome: No Change     Problem: Hematologic Alteration (Stem Cell/Bone Marrow Transplant)  Goal: Blood Counts Within Acceptable Range  Outcome: No Change     Problem: Nausea and Vomiting (Stem Cell/Bone Marrow Transplant)  Goal: Nausea and Vomiting Symptom Relief  Outcome: No Change     "

## 2019-05-16 ENCOUNTER — APPOINTMENT (OUTPATIENT)
Dept: MEDSURG UNIT | Facility: CLINIC | Age: 59
DRG: 016 | End: 2019-05-16
Attending: INTERNAL MEDICINE
Payer: COMMERCIAL

## 2019-05-16 ENCOUNTER — HOSPITAL ENCOUNTER (INPATIENT)
Facility: CLINIC | Age: 59
LOS: 2 days | Discharge: HOME | DRG: 016 | End: 2019-05-18
Attending: INTERNAL MEDICINE | Admitting: INTERNAL MEDICINE
Payer: COMMERCIAL

## 2019-05-16 ENCOUNTER — APPOINTMENT (OUTPATIENT)
Dept: INTERVENTIONAL RADIOLOGY/VASCULAR | Facility: CLINIC | Age: 59
DRG: 016 | End: 2019-05-16
Attending: INTERNAL MEDICINE
Payer: COMMERCIAL

## 2019-05-16 DIAGNOSIS — C90.00 MULTIPLE MYELOMA NOT HAVING ACHIEVED REMISSION (H): Primary | ICD-10-CM

## 2019-05-16 DIAGNOSIS — C90.01 MULTIPLE MYELOMA IN REMISSION (H): ICD-10-CM

## 2019-05-16 LAB
ALBUMIN SERPL-MCNC: 3.1 G/DL (ref 3.4–5)
ALP SERPL-CCNC: 45 U/L (ref 40–150)
ALT SERPL W P-5'-P-CCNC: 17 U/L (ref 0–70)
ANION GAP SERPL CALCULATED.3IONS-SCNC: 7 MMOL/L (ref 3–14)
APTT PPP: 36 SEC (ref 22–37)
AST SERPL W P-5'-P-CCNC: <3 U/L (ref 0–45)
BASOPHILS # BLD AUTO: 0 10E9/L (ref 0–0.2)
BASOPHILS NFR BLD AUTO: 0.2 %
BILIRUB SERPL-MCNC: 0.1 MG/DL (ref 0.2–1.3)
BLOOD BANK CMNT PATIENT-IMP: NORMAL
BUN SERPL-MCNC: 13 MG/DL (ref 7–30)
CALCIUM SERPL-MCNC: 8 MG/DL (ref 8.5–10.1)
CHLORIDE SERPL-SCNC: 110 MMOL/L (ref 94–109)
CO2 SERPL-SCNC: 27 MMOL/L (ref 20–32)
CREAT SERPL-MCNC: 0.86 MG/DL (ref 0.66–1.25)
DIFFERENTIAL METHOD BLD: ABNORMAL
EOSINOPHIL # BLD AUTO: 0.3 10E9/L (ref 0–0.7)
EOSINOPHIL NFR BLD AUTO: 5 %
ERYTHROCYTE [DISTWIDTH] IN BLOOD BY AUTOMATED COUNT: 13.4 % (ref 10–15)
GFR SERPL CREATININE-BSD FRML MDRD: >90 ML/MIN/{1.73_M2}
GLUCOSE SERPL-MCNC: 87 MG/DL (ref 70–99)
HCT VFR BLD AUTO: 25.7 % (ref 40–53)
HGB BLD-MCNC: 8.3 G/DL (ref 13.3–17.7)
IMM GRANULOCYTES # BLD: 0.1 10E9/L (ref 0–0.4)
IMM GRANULOCYTES NFR BLD: 2.2 %
INR PPP: 1.14 (ref 0.86–1.14)
LYMPHOCYTES # BLD AUTO: 1.2 10E9/L (ref 0.8–5.3)
LYMPHOCYTES NFR BLD AUTO: 19.2 %
MAGNESIUM SERPL-MCNC: 1.9 MG/DL (ref 1.6–2.3)
MCH RBC QN AUTO: 33.3 PG (ref 26.5–33)
MCHC RBC AUTO-ENTMCNC: 32.3 G/DL (ref 31.5–36.5)
MCV RBC AUTO: 103 FL (ref 78–100)
MONOCYTES # BLD AUTO: 1.7 10E9/L (ref 0–1.3)
MONOCYTES NFR BLD AUTO: 25.9 %
NEUTROPHILS # BLD AUTO: 3.1 10E9/L (ref 1.6–8.3)
NEUTROPHILS NFR BLD AUTO: 47.5 %
NRBC # BLD AUTO: 0 10*3/UL
NRBC BLD AUTO-RTO: 0 /100
PLATELET # BLD AUTO: 98 10E9/L (ref 150–450)
POTASSIUM SERPL-SCNC: 3.4 MMOL/L (ref 3.4–5.3)
PROT SERPL-MCNC: 5.6 G/DL (ref 6.8–8.8)
RBC # BLD AUTO: 2.49 10E12/L (ref 4.4–5.9)
SODIUM SERPL-SCNC: 144 MMOL/L (ref 133–144)
URATE SERPL-MCNC: 4.6 MG/DL (ref 3.5–7.2)
WBC # BLD AUTO: 6.4 10E9/L (ref 4–11)

## 2019-05-16 PROCEDURE — 86850 RBC ANTIBODY SCREEN: CPT | Performed by: PHYSICIAN ASSISTANT

## 2019-05-16 PROCEDURE — 0JH63XZ INSERTION OF TUNNELED VASCULAR ACCESS DEVICE INTO CHEST SUBCUTANEOUS TISSUE AND FASCIA, PERCUTANEOUS APPROACH: ICD-10-PCS | Performed by: PHYSICIAN ASSISTANT

## 2019-05-16 PROCEDURE — 20600000 ZZH R&B BMT

## 2019-05-16 PROCEDURE — 86902 BLOOD TYPE ANTIGEN DONOR EA: CPT | Performed by: PHYSICIAN ASSISTANT

## 2019-05-16 PROCEDURE — 27210904 ZZH KIT CR6

## 2019-05-16 PROCEDURE — 25000128 H RX IP 250 OP 636: Performed by: PHYSICIAN ASSISTANT

## 2019-05-16 PROCEDURE — 80053 COMPREHEN METABOLIC PANEL: CPT | Performed by: PHYSICIAN ASSISTANT

## 2019-05-16 PROCEDURE — 86901 BLOOD TYPING SEROLOGIC RH(D): CPT | Performed by: PHYSICIAN ASSISTANT

## 2019-05-16 PROCEDURE — 84550 ASSAY OF BLOOD/URIC ACID: CPT | Performed by: PHYSICIAN ASSISTANT

## 2019-05-16 PROCEDURE — 27210738 ZZH ACCESSORY CR2

## 2019-05-16 PROCEDURE — 85730 THROMBOPLASTIN TIME PARTIAL: CPT | Performed by: PHYSICIAN ASSISTANT

## 2019-05-16 PROCEDURE — 76937 US GUIDE VASCULAR ACCESS: CPT

## 2019-05-16 PROCEDURE — 85610 PROTHROMBIN TIME: CPT | Performed by: PHYSICIAN ASSISTANT

## 2019-05-16 PROCEDURE — 3E04305 INTRODUCTION OF OTHER ANTINEOPLASTIC INTO CENTRAL VEIN, PERCUTANEOUS APPROACH: ICD-10-PCS | Performed by: INTERNAL MEDICINE

## 2019-05-16 PROCEDURE — 25800030 ZZH RX IP 258 OP 636: Performed by: NURSE PRACTITIONER

## 2019-05-16 PROCEDURE — 02H633Z INSERTION OF INFUSION DEVICE INTO RIGHT ATRIUM, PERCUTANEOUS APPROACH: ICD-10-PCS | Performed by: PHYSICIAN ASSISTANT

## 2019-05-16 PROCEDURE — 27210908 ZZH NEEDLE CR4

## 2019-05-16 PROCEDURE — 25000132 ZZH RX MED GY IP 250 OP 250 PS 637: Performed by: PHYSICIAN ASSISTANT

## 2019-05-16 PROCEDURE — 25000131 ZZH RX MED GY IP 250 OP 636 PS 637: Performed by: PHYSICIAN ASSISTANT

## 2019-05-16 PROCEDURE — 25000128 H RX IP 250 OP 636: Performed by: INTERNAL MEDICINE

## 2019-05-16 PROCEDURE — 25800030 ZZH RX IP 258 OP 636: Performed by: INTERNAL MEDICINE

## 2019-05-16 PROCEDURE — C1769 GUIDE WIRE: HCPCS

## 2019-05-16 PROCEDURE — 25000125 ZZHC RX 250: Performed by: PHYSICIAN ASSISTANT

## 2019-05-16 PROCEDURE — 86900 BLOOD TYPING SEROLOGIC ABO: CPT | Performed by: PHYSICIAN ASSISTANT

## 2019-05-16 PROCEDURE — 40000172 ZZH STATISTIC PROCEDURE PREP ONLY

## 2019-05-16 PROCEDURE — 36558 INSERT TUNNELED CV CATH: CPT | Mod: LT

## 2019-05-16 PROCEDURE — 85025 COMPLETE CBC W/AUTO DIFF WBC: CPT | Performed by: PHYSICIAN ASSISTANT

## 2019-05-16 PROCEDURE — C1751 CATH, INF, PER/CENT/MIDLINE: HCPCS

## 2019-05-16 PROCEDURE — 25800025 ZZH RX 258: Performed by: PHYSICIAN ASSISTANT

## 2019-05-16 PROCEDURE — 25000128 H RX IP 250 OP 636: Performed by: NURSE PRACTITIONER

## 2019-05-16 PROCEDURE — 83735 ASSAY OF MAGNESIUM: CPT | Performed by: PHYSICIAN ASSISTANT

## 2019-05-16 PROCEDURE — 27210732 ZZH ACCESSORY CR1

## 2019-05-16 RX ORDER — PROCHLORPERAZINE MALEATE 5 MG
5-10 TABLET ORAL EVERY 6 HOURS PRN
Status: DISCONTINUED | OUTPATIENT
Start: 2019-05-16 | End: 2019-05-18 | Stop reason: HOSPADM

## 2019-05-16 RX ORDER — ALLOPURINOL 300 MG/1
300 TABLET ORAL ONCE
Status: COMPLETED | OUTPATIENT
Start: 2019-05-16 | End: 2019-05-16

## 2019-05-16 RX ORDER — MEPERIDINE HYDROCHLORIDE 25 MG/ML
25-50 INJECTION INTRAMUSCULAR; INTRAVENOUS; SUBCUTANEOUS
Status: DISCONTINUED | OUTPATIENT
Start: 2019-05-17 | End: 2019-05-18 | Stop reason: HOSPADM

## 2019-05-16 RX ORDER — FLUMAZENIL 0.1 MG/ML
0.2 INJECTION, SOLUTION INTRAVENOUS
Status: DISCONTINUED | OUTPATIENT
Start: 2019-05-16 | End: 2019-05-16 | Stop reason: HOSPADM

## 2019-05-16 RX ORDER — ACYCLOVIR 800 MG/1
800 TABLET ORAL
Status: DISCONTINUED | OUTPATIENT
Start: 2019-05-16 | End: 2019-05-18

## 2019-05-16 RX ORDER — ACETAMINOPHEN 325 MG/1
325-650 TABLET ORAL EVERY 4 HOURS PRN
Status: DISCONTINUED | OUTPATIENT
Start: 2019-05-16 | End: 2019-05-18 | Stop reason: HOSPADM

## 2019-05-16 RX ORDER — OMEPRAZOLE 20 MG/1
20 TABLET, DELAYED RELEASE ORAL DAILY
Status: DISCONTINUED | OUTPATIENT
Start: 2019-05-16 | End: 2019-05-16

## 2019-05-16 RX ORDER — CALCIUM CARBONATE 500(1250)
1000 TABLET ORAL 2 TIMES DAILY WITH MEALS
Status: DISCONTINUED | OUTPATIENT
Start: 2019-05-16 | End: 2019-05-18 | Stop reason: HOSPADM

## 2019-05-16 RX ORDER — MEPERIDINE HYDROCHLORIDE 25 MG/ML
25 INJECTION INTRAMUSCULAR; INTRAVENOUS; SUBCUTANEOUS
Status: CANCELLED | OUTPATIENT
Start: 2019-06-18

## 2019-05-16 RX ORDER — POTASSIUM CHLORIDE 1.5 G/1.58G
20-40 POWDER, FOR SOLUTION ORAL
Status: DISCONTINUED | OUTPATIENT
Start: 2019-05-16 | End: 2019-05-18 | Stop reason: HOSPADM

## 2019-05-16 RX ORDER — NALOXONE HYDROCHLORIDE 0.4 MG/ML
.1-.4 INJECTION, SOLUTION INTRAMUSCULAR; INTRAVENOUS; SUBCUTANEOUS
Status: DISCONTINUED | OUTPATIENT
Start: 2019-05-16 | End: 2019-05-16 | Stop reason: HOSPADM

## 2019-05-16 RX ORDER — POTASSIUM CHLORIDE 29.8 MG/ML
20 INJECTION INTRAVENOUS
Status: DISCONTINUED | OUTPATIENT
Start: 2019-05-16 | End: 2019-05-18 | Stop reason: HOSPADM

## 2019-05-16 RX ORDER — EPINEPHRINE 1 MG/ML
0.3 INJECTION, SOLUTION, CONCENTRATE INTRAVENOUS EVERY 5 MIN PRN
Status: CANCELLED | OUTPATIENT
Start: 2019-06-18

## 2019-05-16 RX ORDER — EPINEPHRINE 1 MG/ML
0.3 INJECTION, SOLUTION, CONCENTRATE INTRAVENOUS EVERY 5 MIN PRN
Status: CANCELLED | OUTPATIENT
Start: 2019-07-15

## 2019-05-16 RX ORDER — POTASSIUM CHLORIDE 750 MG/1
20-40 TABLET, EXTENDED RELEASE ORAL
Status: DISCONTINUED | OUTPATIENT
Start: 2019-05-16 | End: 2019-05-18 | Stop reason: HOSPADM

## 2019-05-16 RX ORDER — ONDANSETRON 2 MG/ML
4 INJECTION INTRAMUSCULAR; INTRAVENOUS
Status: CANCELLED | OUTPATIENT
Start: 2019-07-15

## 2019-05-16 RX ORDER — PAROXETINE 20 MG/1
20 TABLET, FILM COATED ORAL EVERY MORNING
Status: DISCONTINUED | OUTPATIENT
Start: 2019-05-16 | End: 2019-05-18 | Stop reason: HOSPADM

## 2019-05-16 RX ORDER — PREDNISOLONE ACETATE 10 MG/ML
1-2 SUSPENSION/ DROPS OPHTHALMIC EVERY 4 HOURS PRN
Status: DISCONTINUED | OUTPATIENT
Start: 2019-05-16 | End: 2019-05-16 | Stop reason: CLARIF

## 2019-05-16 RX ORDER — METHYLPREDNISOLONE SODIUM SUCCINATE 125 MG/2ML
100 INJECTION, POWDER, LYOPHILIZED, FOR SOLUTION INTRAMUSCULAR; INTRAVENOUS
Status: CANCELLED
Start: 2019-07-15

## 2019-05-16 RX ORDER — CEFAZOLIN SODIUM 2 G/100ML
2 INJECTION, SOLUTION INTRAVENOUS
Status: COMPLETED | OUTPATIENT
Start: 2019-05-16 | End: 2019-05-16

## 2019-05-16 RX ORDER — FENTANYL CITRATE 50 UG/ML
25-50 INJECTION, SOLUTION INTRAMUSCULAR; INTRAVENOUS
Status: DISCONTINUED | OUTPATIENT
Start: 2019-05-16 | End: 2019-05-16 | Stop reason: HOSPADM

## 2019-05-16 RX ORDER — POTASSIUM CHLORIDE 7.45 MG/ML
10 INJECTION INTRAVENOUS
Status: DISCONTINUED | OUTPATIENT
Start: 2019-05-16 | End: 2019-05-18 | Stop reason: HOSPADM

## 2019-05-16 RX ORDER — AZITHROMYCIN 250 MG/1
250 TABLET, FILM COATED ORAL DAILY
Status: DISCONTINUED | OUTPATIENT
Start: 2019-05-16 | End: 2019-05-18 | Stop reason: HOSPADM

## 2019-05-16 RX ORDER — HEPARIN SODIUM,PORCINE 10 UNIT/ML
5-10 VIAL (ML) INTRAVENOUS
Status: DISCONTINUED | OUTPATIENT
Start: 2019-05-16 | End: 2019-05-18 | Stop reason: HOSPADM

## 2019-05-16 RX ORDER — SODIUM CHLORIDE 9 MG/ML
INJECTION, SOLUTION INTRAVENOUS CONTINUOUS
Status: DISCONTINUED | OUTPATIENT
Start: 2019-05-16 | End: 2019-05-16 | Stop reason: HOSPADM

## 2019-05-16 RX ORDER — MEPERIDINE HYDROCHLORIDE 25 MG/ML
25 INJECTION INTRAMUSCULAR; INTRAVENOUS; SUBCUTANEOUS
Status: CANCELLED | OUTPATIENT
Start: 2019-07-15

## 2019-05-16 RX ORDER — ONDANSETRON 2 MG/ML
4 INJECTION INTRAMUSCULAR; INTRAVENOUS
Status: CANCELLED | OUTPATIENT
Start: 2019-06-18

## 2019-05-16 RX ORDER — DIPHENHYDRAMINE HYDROCHLORIDE 50 MG/ML
25 INJECTION INTRAMUSCULAR; INTRAVENOUS
Status: CANCELLED
Start: 2019-06-18

## 2019-05-16 RX ORDER — ONDANSETRON 8 MG/1
8 TABLET, FILM COATED ORAL EVERY 8 HOURS
Status: COMPLETED | OUTPATIENT
Start: 2019-05-16 | End: 2019-05-17

## 2019-05-16 RX ORDER — HEPARIN SODIUM,PORCINE 10 UNIT/ML
5 VIAL (ML) INTRAVENOUS
Status: COMPLETED | OUTPATIENT
Start: 2019-05-16 | End: 2019-05-16

## 2019-05-16 RX ORDER — FLUCONAZOLE 200 MG/1
200 TABLET ORAL DAILY
Status: DISCONTINUED | OUTPATIENT
Start: 2019-05-16 | End: 2019-05-18 | Stop reason: HOSPADM

## 2019-05-16 RX ORDER — DIPHENHYDRAMINE HCL 25 MG
25 CAPSULE ORAL ONCE
Status: COMPLETED | OUTPATIENT
Start: 2019-05-17 | End: 2019-05-17

## 2019-05-16 RX ORDER — ACETAMINOPHEN 325 MG/1
650 TABLET ORAL ONCE
Status: COMPLETED | OUTPATIENT
Start: 2019-05-17 | End: 2019-05-17

## 2019-05-16 RX ORDER — POTASSIUM CL/LIDO/0.9 % NACL 10MEQ/0.1L
10 INTRAVENOUS SOLUTION, PIGGYBACK (ML) INTRAVENOUS
Status: DISCONTINUED | OUTPATIENT
Start: 2019-05-16 | End: 2019-05-18 | Stop reason: HOSPADM

## 2019-05-16 RX ORDER — ACETAMINOPHEN 325 MG/1
650 TABLET ORAL
Status: CANCELLED | OUTPATIENT
Start: 2019-06-18

## 2019-05-16 RX ORDER — LIDOCAINE 40 MG/G
CREAM TOPICAL
Status: DISCONTINUED | OUTPATIENT
Start: 2019-05-16 | End: 2019-05-16 | Stop reason: HOSPADM

## 2019-05-16 RX ORDER — SODIUM CHLORIDE 9 MG/ML
INJECTION, SOLUTION INTRAVENOUS CONTINUOUS
Status: ACTIVE | OUTPATIENT
Start: 2019-05-16 | End: 2019-05-16

## 2019-05-16 RX ORDER — ACETAMINOPHEN 325 MG/1
650 TABLET ORAL
Status: CANCELLED | OUTPATIENT
Start: 2019-07-15

## 2019-05-16 RX ORDER — DIPHENHYDRAMINE HYDROCHLORIDE 50 MG/ML
25 INJECTION INTRAMUSCULAR; INTRAVENOUS
Status: CANCELLED
Start: 2019-07-15

## 2019-05-16 RX ORDER — SULFAMETHOXAZOLE/TRIMETHOPRIM 800-160 MG
1 TABLET ORAL
Status: DISCONTINUED | OUTPATIENT
Start: 2019-06-17 | End: 2019-05-18 | Stop reason: HOSPADM

## 2019-05-16 RX ORDER — FENTANYL CITRATE 50 UG/ML
25-50 INJECTION, SOLUTION INTRAMUSCULAR; INTRAVENOUS EVERY 5 MIN PRN
Status: DISCONTINUED | OUTPATIENT
Start: 2019-05-16 | End: 2019-05-16 | Stop reason: HOSPADM

## 2019-05-16 RX ORDER — DEXAMETHASONE 4 MG/1
8 TABLET ORAL DAILY
Status: COMPLETED | OUTPATIENT
Start: 2019-05-17 | End: 2019-05-18

## 2019-05-16 RX ORDER — MAGNESIUM SULFATE HEPTAHYDRATE 40 MG/ML
4 INJECTION, SOLUTION INTRAVENOUS EVERY 4 HOURS PRN
Status: DISCONTINUED | OUTPATIENT
Start: 2019-05-16 | End: 2019-05-18 | Stop reason: HOSPADM

## 2019-05-16 RX ORDER — METHYLPREDNISOLONE SODIUM SUCCINATE 125 MG/2ML
100 INJECTION, POWDER, LYOPHILIZED, FOR SOLUTION INTRAMUSCULAR; INTRAVENOUS
Status: CANCELLED
Start: 2019-06-18

## 2019-05-16 RX ORDER — HEPARIN SODIUM,PORCINE 10 UNIT/ML
5 VIAL (ML) INTRAVENOUS EVERY 24 HOURS
Status: DISCONTINUED | OUTPATIENT
Start: 2019-05-16 | End: 2019-05-18 | Stop reason: HOSPADM

## 2019-05-16 RX ADMIN — ACYCLOVIR 800 MG: 800 TABLET ORAL at 21:17

## 2019-05-16 RX ADMIN — CALCIUM 1000 MG: 500 TABLET ORAL at 17:51

## 2019-05-16 RX ADMIN — DEXAMETHASONE 10 MG: 2 TABLET ORAL at 16:03

## 2019-05-16 RX ADMIN — SODIUM CHLORIDE: 9 INJECTION, SOLUTION INTRAVENOUS at 08:31

## 2019-05-16 RX ADMIN — Medication 2.5 ML: at 10:30

## 2019-05-16 RX ADMIN — ONDANSETRON HYDROCHLORIDE 8 MG: 8 TABLET, FILM COATED ORAL at 16:04

## 2019-05-16 RX ADMIN — ALLOPURINOL 300 MG: 300 TABLET ORAL at 12:03

## 2019-05-16 RX ADMIN — CALCIUM 1000 MG: 500 TABLET ORAL at 12:03

## 2019-05-16 RX ADMIN — FLUCONAZOLE 200 MG: 200 TABLET ORAL at 16:03

## 2019-05-16 RX ADMIN — LIDOCAINE HYDROCHLORIDE 8 ML: 10 INJECTION, SOLUTION EPIDURAL; INFILTRATION; INTRACAUDAL; PERINEURAL at 10:31

## 2019-05-16 RX ADMIN — CEFAZOLIN SODIUM 2 G: 2 INJECTION, SOLUTION INTRAVENOUS at 08:32

## 2019-05-16 RX ADMIN — DEXTROSE AND SODIUM CHLORIDE: 5; 450 INJECTION, SOLUTION INTRAVENOUS at 11:47

## 2019-05-16 RX ADMIN — ACYCLOVIR 800 MG: 800 TABLET ORAL at 16:04

## 2019-05-16 RX ADMIN — MELPHALAN HYDROCHLORIDE 280 MG: KIT at 17:09

## 2019-05-16 RX ADMIN — ACYCLOVIR 800 MG: 800 TABLET ORAL at 17:51

## 2019-05-16 RX ADMIN — VITAMIN D, TAB 1000IU (100/BT) 2000 UNITS: 25 TAB at 12:03

## 2019-05-16 RX ADMIN — AZITHROMYCIN 250 MG: 250 TABLET, FILM COATED ORAL at 12:03

## 2019-05-16 ASSESSMENT — ACTIVITIES OF DAILY LIVING (ADL)
TOILETING: 0-->INDEPENDENT
AMBULATION: 0-->INDEPENDENT
FALL_HISTORY_WITHIN_LAST_SIX_MONTHS: NO
SWALLOWING: 0-->SWALLOWS FOODS/LIQUIDS WITHOUT DIFFICULTY
RETIRED_EATING: 0-->INDEPENDENT
BATHING: 0-->INDEPENDENT
COGNITION: 0 - NO COGNITION ISSUES REPORTED
DRESS: 0-->INDEPENDENT
ADLS_ACUITY_SCORE: 11
ADLS_ACUITY_SCORE: 11
RETIRED_COMMUNICATION: 0-->UNDERSTANDS/COMMUNICATES WITHOUT DIFFICULTY
TRANSFERRING: 0-->INDEPENDENT
ADLS_ACUITY_SCORE: 11

## 2019-05-16 NOTE — PHARMACY-ADMISSION MEDICATION HISTORY
Admission medication history interview status for the 5/16/2019 admission is complete. See Epic admission navigator for allergy information, pharmacy, prior to admission medications and immunization status.     Patient was seen in BMT clinic by pharmacist during workup week, please refer to that encounter for med rec documentation      Prior to Admission medications    Medication Sig Last Dose Taking? Auth Provider   acetaminophen (TYLENOL) 325 MG tablet Take 1-2 tablets (325-650 mg) by mouth every 4 hours as needed for mild pain 5/15/2019 at Unknown time Yes Jadyn Marrufo PA-C   acyclovir (ZOVIRAX) 400 MG tablet Take 400 mg by mouth every 12 hours  5/16/2019 at Unknown time Yes Reported, Patient   calcium carbonate (CALCIUM CARBONATE) 600 MG tablet Take 2 tablets by mouth daily  Past Week at Unknown time Yes Reported, Patient   Cholecalciferol (VITAMIN D3) 2000 units CAPS Take 2,000 Units by mouth daily  Past Week at Unknown time Yes Reported, Patient   loratadine (CLARITIN) 10 MG tablet Take 10 mg by mouth daily 5/15/2019 at Unknown time Yes Reported, Patient   omeprazole (PRILOSEC OTC) 20 MG tablet Take 20 mg by mouth daily  5/16/2019 at Unknown time Yes Reported, Patient   PARoxetine (PAXIL) 20 MG tablet Take 20 mg by mouth every morning 5/16/2019 at Unknown time Yes Reported, Patient   calcipotriene (DOVONOX) 0.005 % SOLN solution Apply topically 2 times daily as needed  Unknown at Unknown time  Reported, Patient   prednisoLONE acetate (PRED FORTE) 1 % ophthalmic susp Place 1-2 drops into both eyes every 4 hours as needed for dry eyes  More than a month at Unknown time  Reported, Patient

## 2019-05-16 NOTE — PROGRESS NOTES
Pt admitted to 2A for a central line placement.  PIV Placed.  Consent and H and P up to date.  Pt will be admitted to 5C.  No family here.  Wife's number in chart.

## 2019-05-16 NOTE — PROGRESS NOTES
Interventional Radiology Pre-Procedure Sedation Assessment   Time of Assessment: 8:52 AM    Expected Level: Moderate Sedation    Indication: Sedation is required for the following type of Procedure: Venous Access    Sedation and procedural consent: Risks, benefits and alternatives were discussed with Patient    PO Intake: Appropriately NPO for procedure    ASA Class: Class 2 - MILD SYSTEMIC DISEASE, NO ACUTE PROBLEMS, NO FUNCTIONAL LIMITATIONS.    Mallampati: Grade 2:  Soft palate, base of uvula, tonsillar pillars, and portion of posterior pharyngeal wall visible    Lungs: Lungs Clear with good breath sounds bilaterally    Heart: Normal heart sounds and rate    History and physical reviewed and no updates needed. I have reviewed the lab findings, diagnostic data, medications, and the plan for sedation. I have determined this patient to be an appropriate candidate for the planned sedation and procedure and have reassessed the patient IMMEDIATELY PRIOR to sedation and procedure.    Aj Mancera PA-C  Interventional Radiology  [phone]
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
## 2019-05-16 NOTE — PROCEDURES
Interventional Radiology Brief Post Procedure Note    Procedure: IR CVC TUNNEL PLACEMENT > 5 YRS OF AGE    Proceduralist: Horacio Garrido PA-C    Assistant: None    Time Out: Prior to the start of the procedure and with procedural staff participation, I verbally confirmed the patient s identity using two indicators, relevant allergies, that the procedure was appropriate and matched the consent or emergent situation, and that the correct equipment/implants were available. Immediately prior to starting the procedure I conducted the Time Out with the procedural staff and re-confirmed the patient s name, procedure, and site/side. (The Joint Commission universal protocol was followed.)  Yes    Medications   Medication Event Details Admin User Admin Time   heparin lock flush 10 UNIT/ML injection 5 mL Medication Given Dose: 2.5 mL; Route: Intravenous Garima Trujillo RN 5/16/2019 10:30 AM   lidocaine 1 % 1-30 mL Medication Given Dose: 8 mL; Route: Intradermal Garima Trujillo RN 5/16/2019 10:31 AM       Sedation: None. Local Anesthestic used    Findings: Completed image-guided placement of 9.5 Polish, 23 cm double lumen tunneled central venous catheter via right IJ. Aspirates and flushes freely, heparin locked and ready for immediate use. Tip in high right atrium.      Estimated Blood Loss: Minimal    Fluoroscopy Time: 0.5 minute(s)    SPECIMENS: None    Complications: 1. None     Condition: Stable    Plan: Follow-up per primary team.       Comments: See dictated procedure note for full details.    Horacio Garrido PA-C

## 2019-05-16 NOTE — IR NOTE
Patient Name: Angel Yanez  Medical Record Number: [id number]  Today's Date: 5/16/2019    Procedure: tunneled double lumen central venous catheter  Proceduralist: Horacio Garrido PA-C    Procedure start time: 1017  Puncture time: 1020  Procedure end time: 1045    Report given to: VIDAL Segovia  : n/a    Other Notes: Pt arrived to IR room 2 from . Consent reviewed. Pt denies any questions or concerns regarding procedurePt positioned supine and monitored per protocol.     Target vessel identified.Catheter placed. Blood return in both lumens. Lumens flushed with 2.5cc 10u/cc heparin. Line sutured in placed. Central line dressing applied. Pt tolerated procedure without any noted complications. Pt transferred to  for recovery and admission.

## 2019-05-16 NOTE — PROGRESS NOTES
BMT Teaching Flowsheet    Angel Yanez is a 58 year old male  The primary encounter diagnosis was Multiple myeloma not having achieved remission (H). A diagnosis of Multiple myeloma in remission (H) was also pertinent to this visit.    Teaching Topic: Autologous stem cell transplant discharge teaching     Person(s) involved in teaching: Patient  Motivation Level  Asks Questions: Yes  Eager to Learn: Yes  Cooperative: Yes  Receptive (willing/able to accept information): Yes  Any cultural factors/Mormonism beliefs that may influence understanding or compliance? No    Patient demonstrates understanding of the following:  - Reason for the appointment, diagnosis and treatment plan: Yes  - Knowledge of proper use of medications and conditions for which they are ordered (with special attention to potential side effects or drug interactions): No, explain: Bedside RN to complete medication teaching with patient and caregiver prior to discharge.   - Which situations necessitate calling provider and whom to contact: Yes    Teaching concerns addressed: None    Proper use and care of (medical equipment, care aids, etc.) Yes  Pain management techniques: Yes  Patient instructed on hand hygiene: Yes  How and/when to access community resources: Yes    Infection Control:  Patient demonstrates understanding of the following:  Surgical procedure site care taught NA  Signs and symptoms of infection taught Yes  Wound care taught NA  Central venous catheter care taught No, explain: Patient and caregiver previously received teaching, and decline further need     Instructional Materials Used/Given: Discharge teaching completed with patient and family. Written guidelines provided including clinic follow up, signs and symptoms to report, infection prevention, and contact list. Reviewed potential side effects s/p transplant and what to expect during recovery period. Discussed clinic routines. Discussed activities to avoid to prevent  infections and mask guidelines. [facility] home infusion for line care supplies. Pt and family verbalize understanding of material via teach back and all questions have been answered.     Time spent with patient: 25 minutes.    Specific Concerns: NA

## 2019-05-17 ENCOUNTER — APPOINTMENT (OUTPATIENT)
Dept: OCCUPATIONAL THERAPY | Facility: CLINIC | Age: 59
DRG: 016 | End: 2019-05-17
Attending: PHYSICIAN ASSISTANT
Payer: COMMERCIAL

## 2019-05-17 LAB
ALBUMIN SERPL-MCNC: 3.2 G/DL (ref 3.4–5)
ALP SERPL-CCNC: 49 U/L (ref 40–150)
ALT SERPL W P-5'-P-CCNC: 18 U/L (ref 0–70)
ANION GAP SERPL CALCULATED.3IONS-SCNC: 7 MMOL/L (ref 3–14)
AST SERPL W P-5'-P-CCNC: 5 U/L (ref 0–45)
BASOPHILS # BLD AUTO: 0 10E9/L (ref 0–0.2)
BASOPHILS NFR BLD AUTO: 0 %
BILIRUB SERPL-MCNC: 0.2 MG/DL (ref 0.2–1.3)
BLD PROD TYP BPU: NORMAL
BLD PROD TYP BPU: NORMAL
BLD UNIT ID BPU: 0
BLD UNIT ID BPU: 0
BLOOD PRODUCT CODE: NORMAL
BLOOD PRODUCT CODE: NORMAL
BPU ID: NORMAL
BPU ID: NORMAL
BUN SERPL-MCNC: 11 MG/DL (ref 7–30)
CALCIUM SERPL-MCNC: 8.5 MG/DL (ref 8.5–10.1)
CHLORIDE SERPL-SCNC: 107 MMOL/L (ref 94–109)
CMV DNA SPEC NAA+PROBE-ACNC: NORMAL [IU]/ML
CMV DNA SPEC NAA+PROBE-LOG#: NORMAL {LOG_IU}/ML
CO2 SERPL-SCNC: 28 MMOL/L (ref 20–32)
CREAT SERPL-MCNC: 0.83 MG/DL (ref 0.66–1.25)
DIFFERENTIAL METHOD BLD: ABNORMAL
EOSINOPHIL # BLD AUTO: 0 10E9/L (ref 0–0.7)
EOSINOPHIL NFR BLD AUTO: 0 %
ERYTHROCYTE [DISTWIDTH] IN BLOOD BY AUTOMATED COUNT: 12.9 % (ref 10–15)
GFR SERPL CREATININE-BSD FRML MDRD: >90 ML/MIN/{1.73_M2}
GLUCOSE SERPL-MCNC: 164 MG/DL (ref 70–99)
HCT VFR BLD AUTO: 26.3 % (ref 40–53)
HGB BLD-MCNC: 8.6 G/DL (ref 13.3–17.7)
LYMPHOCYTES # BLD AUTO: 0.4 10E9/L (ref 0.8–5.3)
LYMPHOCYTES NFR BLD AUTO: 17.4 %
MAGNESIUM SERPL-MCNC: 1.9 MG/DL (ref 1.6–2.3)
MCH RBC QN AUTO: 33.9 PG (ref 26.5–33)
MCHC RBC AUTO-ENTMCNC: 32.7 G/DL (ref 31.5–36.5)
MCV RBC AUTO: 104 FL (ref 78–100)
MONOCYTES # BLD AUTO: 0.2 10E9/L (ref 0–1.3)
MONOCYTES NFR BLD AUTO: 7.8 %
NEUTROPHILS # BLD AUTO: 1.9 10E9/L (ref 1.6–8.3)
NEUTROPHILS NFR BLD AUTO: 74.8 %
NRBC # BLD AUTO: 0 10*3/UL
NRBC BLD AUTO-RTO: 1 /100
PLATELET # BLD AUTO: 104 10E9/L (ref 150–450)
PLATELET # BLD EST: ABNORMAL 10*3/UL
POTASSIUM SERPL-SCNC: 3.8 MMOL/L (ref 3.4–5.3)
POTASSIUM SERPL-SCNC: 3.9 MMOL/L (ref 3.4–5.3)
PROT SERPL-MCNC: 6 G/DL (ref 6.8–8.8)
RBC # BLD AUTO: 2.54 10E12/L (ref 4.4–5.9)
RBC MORPH BLD: NORMAL
SODIUM SERPL-SCNC: 141 MMOL/L (ref 133–144)
SPECIMEN SOURCE: NORMAL
TRANSFUSION STATUS PATIENT QL: NORMAL
WBC # BLD AUTO: 2.5 10E9/L (ref 4–11)

## 2019-05-17 PROCEDURE — 20600000 ZZH R&B BMT

## 2019-05-17 PROCEDURE — 25000132 ZZH RX MED GY IP 250 OP 250 PS 637: Performed by: INTERNAL MEDICINE

## 2019-05-17 PROCEDURE — 25000128 H RX IP 250 OP 636: Performed by: PHYSICIAN ASSISTANT

## 2019-05-17 PROCEDURE — 83735 ASSAY OF MAGNESIUM: CPT | Performed by: PHYSICIAN ASSISTANT

## 2019-05-17 PROCEDURE — 97110 THERAPEUTIC EXERCISES: CPT | Mod: GO

## 2019-05-17 PROCEDURE — 38208 THAW PRESERVED STEM CELLS: CPT | Performed by: INTERNAL MEDICINE

## 2019-05-17 PROCEDURE — 25800025 ZZH RX 258: Performed by: PHYSICIAN ASSISTANT

## 2019-05-17 PROCEDURE — 25000131 ZZH RX MED GY IP 250 OP 636 PS 637: Performed by: PHYSICIAN ASSISTANT

## 2019-05-17 PROCEDURE — 25000132 ZZH RX MED GY IP 250 OP 250 PS 637: Performed by: PHYSICIAN ASSISTANT

## 2019-05-17 PROCEDURE — 40000893 ZZH STATISTIC PT IP EVAL DEFER: Performed by: PHYSICAL THERAPIST

## 2019-05-17 PROCEDURE — 84132 ASSAY OF SERUM POTASSIUM: CPT | Performed by: PHYSICIAN ASSISTANT

## 2019-05-17 PROCEDURE — 30243G0 TRANSFUSION OF AUTOLOGOUS BONE MARROW INTO CENTRAL VEIN, PERCUTANEOUS APPROACH: ICD-10-PCS | Performed by: INTERNAL MEDICINE

## 2019-05-17 PROCEDURE — 80053 COMPREHEN METABOLIC PANEL: CPT | Performed by: PHYSICIAN ASSISTANT

## 2019-05-17 PROCEDURE — 97535 SELF CARE MNGMENT TRAINING: CPT | Mod: GO

## 2019-05-17 PROCEDURE — 25000128 H RX IP 250 OP 636: Performed by: INTERNAL MEDICINE

## 2019-05-17 PROCEDURE — 97165 OT EVAL LOW COMPLEX 30 MIN: CPT | Mod: GO

## 2019-05-17 PROCEDURE — 85025 COMPLETE CBC W/AUTO DIFF WBC: CPT | Performed by: PHYSICIAN ASSISTANT

## 2019-05-17 RX ORDER — FUROSEMIDE 10 MG/ML
20 INJECTION INTRAMUSCULAR; INTRAVENOUS ONCE
Status: COMPLETED | OUTPATIENT
Start: 2019-05-17 | End: 2019-05-17

## 2019-05-17 RX ORDER — DIPHENHYDRAMINE HCL 25 MG
25 CAPSULE ORAL ONCE
Status: COMPLETED | OUTPATIENT
Start: 2019-05-17 | End: 2019-05-17

## 2019-05-17 RX ORDER — PROCHLORPERAZINE MALEATE 5 MG
5-10 TABLET ORAL EVERY 6 HOURS PRN
Qty: 30 TABLET | Refills: 0 | Status: SHIPPED | OUTPATIENT
Start: 2019-05-17 | End: 2019-06-08

## 2019-05-17 RX ORDER — FLUCONAZOLE 200 MG/1
200 TABLET ORAL DAILY
Qty: 30 TABLET | Refills: 0 | Status: SHIPPED | OUTPATIENT
Start: 2019-05-18 | End: 2019-07-08

## 2019-05-17 RX ORDER — ACYCLOVIR 800 MG/1
800 TABLET ORAL
Qty: 150 TABLET | Refills: 0 | Status: ON HOLD | OUTPATIENT
Start: 2019-05-17 | End: 2019-05-28

## 2019-05-17 RX ORDER — SULFAMETHOXAZOLE/TRIMETHOPRIM 800-160 MG
1 TABLET ORAL
Qty: 16 TABLET | Refills: 11 | Status: ON HOLD | OUTPATIENT
Start: 2019-06-17 | End: 2019-05-28

## 2019-05-17 RX ORDER — ACETAMINOPHEN 325 MG/1
650 TABLET ORAL ONCE
Status: COMPLETED | OUTPATIENT
Start: 2019-05-17 | End: 2019-05-17

## 2019-05-17 RX ORDER — HYDRALAZINE HYDROCHLORIDE 20 MG/ML
10 INJECTION INTRAMUSCULAR; INTRAVENOUS EVERY 6 HOURS PRN
Status: DISCONTINUED | OUTPATIENT
Start: 2019-05-17 | End: 2019-05-18 | Stop reason: HOSPADM

## 2019-05-17 RX ORDER — AZITHROMYCIN 250 MG/1
250 TABLET, FILM COATED ORAL DAILY
Qty: 14 TABLET | Refills: 0 | Status: ON HOLD | OUTPATIENT
Start: 2019-05-18 | End: 2019-05-28

## 2019-05-17 RX ADMIN — VITAMIN D, TAB 1000IU (100/BT) 2000 UNITS: 25 TAB at 08:32

## 2019-05-17 RX ADMIN — CALCIUM 1000 MG: 500 TABLET ORAL at 17:23

## 2019-05-17 RX ADMIN — ACYCLOVIR 800 MG: 800 TABLET ORAL at 20:14

## 2019-05-17 RX ADMIN — CALCIUM 1000 MG: 500 TABLET ORAL at 08:32

## 2019-05-17 RX ADMIN — ACYCLOVIR 800 MG: 800 TABLET ORAL at 11:38

## 2019-05-17 RX ADMIN — ACYCLOVIR 800 MG: 800 TABLET ORAL at 15:23

## 2019-05-17 RX ADMIN — ACYCLOVIR 800 MG: 800 TABLET ORAL at 17:23

## 2019-05-17 RX ADMIN — ONDANSETRON HYDROCHLORIDE 8 MG: 8 TABLET, FILM COATED ORAL at 00:34

## 2019-05-17 RX ADMIN — Medication 5 ML: at 04:04

## 2019-05-17 RX ADMIN — AZITHROMYCIN 250 MG: 250 TABLET, FILM COATED ORAL at 08:32

## 2019-05-17 RX ADMIN — ACYCLOVIR 800 MG: 800 TABLET ORAL at 08:32

## 2019-05-17 RX ADMIN — DIPHENHYDRAMINE HYDROCHLORIDE 25 MG: 25 CAPSULE ORAL at 20:11

## 2019-05-17 RX ADMIN — FLUCONAZOLE 200 MG: 200 TABLET ORAL at 08:32

## 2019-05-17 RX ADMIN — FUROSEMIDE 20 MG: 10 INJECTION, SOLUTION INTRAVENOUS at 19:22

## 2019-05-17 RX ADMIN — ACETAMINOPHEN 650 MG: 325 TABLET, FILM COATED ORAL at 17:23

## 2019-05-17 RX ADMIN — DEXAMETHASONE 8 MG: 4 TABLET ORAL at 08:32

## 2019-05-17 RX ADMIN — ONDANSETRON HYDROCHLORIDE 8 MG: 8 TABLET, FILM COATED ORAL at 08:32

## 2019-05-17 RX ADMIN — OMEPRAZOLE 20 MG: 20 CAPSULE, DELAYED RELEASE ORAL at 08:32

## 2019-05-17 RX ADMIN — DIPHENHYDRAMINE HYDROCHLORIDE 25 MG: 25 CAPSULE ORAL at 17:22

## 2019-05-17 RX ADMIN — PAROXETINE HYDROCHLORIDE HEMIHYDRATE 20 MG: 20 TABLET, FILM COATED ORAL at 08:32

## 2019-05-17 RX ADMIN — ACETAMINOPHEN 650 MG: 325 TABLET, FILM COATED ORAL at 20:32

## 2019-05-17 RX ADMIN — DEXTROSE AND SODIUM CHLORIDE: 5; 450 INJECTION, SOLUTION INTRAVENOUS at 00:34

## 2019-05-17 RX ADMIN — DEXTROSE MONOHYDRATE AND SODIUM CHLORIDE: 5; .45 INJECTION, SOLUTION INTRAVENOUS at 12:09

## 2019-05-17 RX ADMIN — DEXTROSE MONOHYDRATE AND SODIUM CHLORIDE: 5; .45 INJECTION, SOLUTION INTRAVENOUS at 22:00

## 2019-05-17 ASSESSMENT — ACTIVITIES OF DAILY LIVING (ADL)
ADLS_ACUITY_SCORE: 11
ADLS_ACUITY_SCORE: 11
PREVIOUS_RESPONSIBILITIES: MEAL PREP;HOUSEKEEPING;LAUNDRY;SHOPPING;YARDWORK;MEDICATION MANAGEMENT;FINANCES;DRIVING;WORK
IADL_COMMENTS: PT WAS PREVIOUSLY INDEPENDENT WITH IADL COMPLETION
ADLS_ACUITY_SCORE: 11

## 2019-05-17 NOTE — PLAN OF CARE
Patient admitted to floor for autologous transplant, he offers no complaints. New line placed in IR dressing C/D/I.  Melphalan given this evening he tolerated the drug well. He munched on ice and popsicles before and after chemotherapy. Chemotherapy ended at 17:30. Continue to monitor  Problem: Adult Inpatient Plan of Care  Goal: Plan of Care Review  Outcome: No Change  Goal: Patient-Specific Goal (Individualization)  Outcome: No Change  Goal: Absence of Hospital-Acquired Illness or Injury  Outcome: No Change  Goal: Optimal Comfort and Wellbeing  Outcome: No Change  Goal: Readiness for Transition of Care  Outcome: No Change  Goal: Rounds/Family Conference  Outcome: No Change     Problem: Adjustment to Transplant (Stem Cell/Bone Marrow Transplant)  Goal: Optimal Coping with Transplant  Outcome: No Change     Problem: Bladder Irritation (Stem Cell/Bone Marrow Transplant)  Goal: Symptom-Free Urinary Elimination  Outcome: No Change     Problem: Diarrhea (Stem Cell/Bone Marrow Transplant)  Goal: Diarrhea Symptom Control  Outcome: No Change     Problem: Fatigue (Stem Cell/Bone Marrow Transplant)  Goal: Energy Level Supports Daily Activity  Outcome: No Change     Problem: Hematologic Alteration (Stem Cell/Bone Marrow Transplant)  Goal: Blood Counts Within Acceptable Range  Outcome: No Change     Problem: Hypersensitivity Reaction (Stem Cell/Bone Marrow Transplant)  Goal: Absence of Hypersensitivity Reaction  Outcome: No Change     Problem: Infection Risk (Stem Cell/Bone Marrow Transplant)  Goal: Absence of Infection Signs/Symptoms  Outcome: No Change     Problem: Mucositis (Stem Cell/Bone Marrow Transplant)  Goal: Mucous Membrane Health and Integrity  Outcome: No Change     Problem: Nausea and Vomiting (Stem Cell/Bone Marrow Transplant)  Goal: Nausea and Vomiting Symptom Relief  Outcome: No Change     Problem: Nutrition Intake Altered (Stem Cell/Bone Marrow Transplant)  Goal: Optimal Nutrition Intake  Outcome: No Change

## 2019-05-17 NOTE — PROGRESS NOTES
05/17/19 0900   Quick Adds   Type of Visit Initial Occupational Therapy Evaluation   Living Environment   Lives With spouse   Living Arrangements house   Home Accessibility stairs within home   Number of Stairs, Within Home, Primary 7   Transportation Anticipated car, drives self;family or friend will provide   Living Environment Comment Pt lives in a split entry house with his spouse.    Functional Level   Ambulation 0-->independent   Transferring 0-->independent   Toileting 0-->independent   Bathing 0-->independent   Dressing 0-->independent   Eating 0-->independent   Communication 0-->understands/communicates without difficulty   Cognition 0 - no cognition issues reported   Fall history within last six months no   Which of the above functional risks had a recent onset or change? none       Present no   Language english   General Information   Onset of Illness/Injury or Date of Surgery - Date 05/16/19   Referring Physician Jadyn Marrufo PA-C   Patient/Family Goals Statement To return homr   Additional Occupational Profile Info/Pertinent History of Current Problem Angel Yanez is a 58 year old male with IgG Kappa MM D-1 prep for 2nd auto BMT    Precautions/Limitations spinal precautions   Weight-Bearing Status - LUE other (see comments)  (10 lbs)   Weight-Bearing Status - RUE other (see comments)  (10 lbs)   Weight-Bearing Status - LLE full weight-bearing   Weight-Bearing Status - RLE full weight-bearing   General Observations Pt is pleasant and agreeable to therapy   General Info Comments Activity: up ad mickey   Cognitive Status Examination   Orientation orientation to person, place and time   Level of Consciousness alert   Follows Commands (Cognition) WNL   Memory intact   Attention No deficits were identified   Organization/Problem Solving No deficits were identified   Executive Function No deficits were identified   Cognitive Comment Pt is alert, oriented and appropriate in  conversation   Visual Perception   Visual Perception No deficits were identified   Sensory Examination   Sensory Quick Adds No deficits were identified   Integumentary/Edema   Integumentary/Edema no deficits were identifed   Range of Motion (ROM)   ROM Quick Adds No deficits were identified   Strength   Manual Muscle Testing Quick Adds No deficits were identified   Hand Strength   Hand Strength Comments Bilateral  strength WNL   Mobility   Bed Mobility Bed mobility skill: Supine to sit   Bed Mobility Skill: Supine to Sit   Level of [place]: Supine/Sit independent   Transfer Skill: Sit to Stand   Level of [place]: Sit/Stand independent   Transfer Skill: Sit to Stand full weight-bearing   Instrumental Activities of Daily Living (IADL)   Previous Responsibilities meal prep;housekeeping;laundry;shopping;yardwork;medication management;finances;driving;work   IADL Comments Pt was previously independent with IADL completion   Activities of Daily Living Analysis   Impairments Contributing to Impaired Activities of Daily Living postural control impaired;strength decreased   General Therapy Interventions   Planned Therapy Interventions home program guidelines;progressive activity/exercise   Clinical Impression   Criteria for Skilled Therapeutic Interventions Met yes, treatment indicated   OT Diagnosis decreased activity tolerance   Influenced by the following impairments immunosuppressed, fatigue   Assessment of Occupational Performance 1-3 Performance Deficits   Identified Performance Deficits functional mobility, IADLs   Clinical Decision Making (Complexity) Low complexity   Therapy Frequency 5 times/wk   Predicted Duration of Therapy Intervention (days/wks) 5/20/19   Anticipated Equipment Needs at Discharge other (see comments)  (none)   Anticipated Discharge Disposition Home with Assist   Risks and Benefits of Treatment have been explained. Yes   Patient, Family & other staff in agreement with plan of care  "Yes   [facility] TM \"6 Clicks\"   2016, Trustees of [facility], under license to [employer].  All rights reserved.   6 Clicks Short Forms Daily Activity Inpatient Short Form   [facility]  \"6 Clicks\" Daily Activity Inpatient Short Form   1. Putting on and taking off regular lower body clothing? 4 - None   2. Bathing (including washing, rinsing, drying)? 4 - None   3. Toileting, which includes using toilet, bedpan or urinal? 4 - None   4. Putting on and taking off regular upper body clothing? 4 - None   5. Taking care of personal grooming such as brushing teeth? 4 - None   6. Eating meals? 4 - None   Daily Activity Raw Score (Score out of 24.Lower scores equate to lower levels of function) 24   Total Evaluation Time   Total Evaluation Time (Minutes) 5     "

## 2019-05-17 NOTE — PROCEDURES
BMT/Cellular Autologous Product Infusion         Patient Vitals for the past 24 hrs:   Temp Temp src Pulse Resp Heart Rate BP   05/16/19 1547 98.8  F (37.1  C) Oral 89 16 -- 117/77   05/16/19 1956 98.8  F (37.1  C) Axillary -- 16 75 127/80   05/17/19 0032 97.8  F (36.6  C) Axillary 80 16 -- 129/82   05/17/19 0353 98.2  F (36.8  C) Axillary 83 16 -- 122/85   05/17/19 0815 98.5  F (36.9  C) Oral -- 18 85 125/81   05/17/19 1235 98.1  F (36.7  C) Oral 96 18 -- (!) 137/92   05/17/19 1326 -- -- -- -- -- 131/79         BMT INFUSION DOCUMENTATION (last 48 hours)      BMT/Cellular Product Infusion    No documentation.                       Baseline Pre-Infusion Evaluation (to be completed by Provider):   Dyspnea: Grade 0 - none  Hypoxia: Grade 0 - not present  Fever: Grade 0 - afebrile  Chills: Grade 0 - none  Febrile Neutropenia: Grade 0 - not present  Sinus Bradycardia: Grade 0 - none  Hypertension: Grade 2 - stage 1 hypertension (systolic -159 mm Hg or diastolic BP 90-99 mm Hg); medical intervention indicated; recurrent or persistent (>/ 24 hours); symptomatic increase by >/ 20 mm Hg (diastolic) or to > 140/90 mm Hg if previously WNL; monotherapy indicated  Hypotension: Grade 0 - none  Chest Pain: Grade 0 - none  Bronchospasm: Grade 0 - none  Pain: Grade 0 - none  Rash: Grade 0 - None  Neurologic Specify: none    If adverse reactions, events or complications occur (fever greater than 2 degrees fahrenheit increase, and severe reactions of the following types: chills, dyspnea, bronchospasm, hyper/hypotension, hypoxia, bradycardia, chest pain, back/flank pain, hypoxia, and any other reaction deemed severe or life threatening; any instance of product bag breakage or unusual product appearance)    Any other events that are >= grade 3, then immediately contact the BMT Attending physician, the Cell Therapy Laboratory Medical Director (pager [phone]) and the Cell Therapy Laboratory ([phone]).  After midnight,  holidays & weekends contact the [facility] on the appropriate campus ([facility]: [phone]; [facility]: [phone]).    Latrice Pride PA-C

## 2019-05-17 NOTE — PLAN OF CARE
AVSS. Guille offered no complaints overnight. His post-melphalan flush ended at 0330. Currently heparin locked. No replacements needed this morning. BM harvest site dressings C/D/I. He will get his transplant this evening.  Continue to monitor.    Problem: Adult Inpatient Plan of Care  Goal: Plan of Care Review  5/17/2019 0639 by Brea Panda RN  Outcome: No Change     Problem: Adult Inpatient Plan of Care  Goal: Optimal Comfort and Wellbeing  5/17/2019 0639 by Brea Panda RN  Outcome: No Change     Problem: Adjustment to Transplant (Stem Cell/Bone Marrow Transplant)  Goal: Optimal Coping with Transplant  5/17/2019 0639 by Brea Panda RN  Outcome: No Change     Problem: Hematologic Alteration (Stem Cell/Bone Marrow Transplant)  Goal: Blood Counts Within Acceptable Range  5/17/2019 0639 by Brea Panda RN  Outcome: No Change     Problem: Hypersensitivity Reaction (Stem Cell/Bone Marrow Transplant)  Goal: Absence of Hypersensitivity Reaction  5/17/2019 0639 by Brea Panda RN  Outcome: No Change     Problem: Infection Risk (Stem Cell/Bone Marrow Transplant)  Goal: Absence of Infection Signs/Symptoms  5/17/2019 0639 by Brea Panda RN  Outcome: No Change     Problem: Nausea and Vomiting (Stem Cell/Bone Marrow Transplant)  Goal: Nausea and Vomiting Symptom Relief  5/17/2019 0639 by Brea Panda RN  Outcome: No Change

## 2019-05-17 NOTE — PLAN OF CARE
PT 5C: DEFER- Per chart review, discussion with OT and observation of ambulation in hallway, pt does not need IP PT. Pt up independent with transfers, ambulation and stairs. OT providing information for lab value education, HEP programs for strength and aerobic exercise. No IP PT during short stay for auto BMT. PT deferred.

## 2019-05-17 NOTE — PLAN OF CARE
OT 5C  Discharge Planner OT   Patient plan for discharge: Home  Current status: Eval complete, treatment indicated. Independent supine<>EOB, sit<>stand. Pt ambulated for ~8 minutes independently. Pt ambulated up and down 3 stairs x4 reps with no hand railing independently. Gave pt auto folder and provided education on contents.   Barriers to return to prior living situation: acute medical needs  Recommendations for discharge: Home with assist as needed  Rationale for recommendations: Pt is independent with ADL completion, however would benefit from continued skilled therapy to increase activity tolerance       Entered by: Rubi Sands 05/17/2019 9:48 AM

## 2019-05-17 NOTE — PLAN OF CARE
Problem: Adult Inpatient Plan of Care  Goal: Plan of Care Review  5/17/2019 1548 by Chanel Perez RN  Outcome: No Change  Goal: Patient-Specific Goal (Individualization)  Outcome: No Change  Goal: Absence of Hospital-Acquired Illness or Injury  Outcome: No Change  Goal: Optimal Comfort and Wellbeing  5/17/2019 1548 by Chanel Perez RN  Outcome: No Change  Goal: Readiness for Transition of Care  Outcome: No Change  Goal: Rounds/Family Conference  Outcome: No Change  Afebrile. [place] sites on back are sore and covered with steri strips. No nausea. Started the transplant flush at noon. Transplant will be at 6 pm. Independent. Showered.

## 2019-05-17 NOTE — PROGRESS NOTES
BMT Daily Progress Note   05/17/2019    Patient ID:  Angel Yanez is a 58 year old male, currently day 0 of his HCT.     Diagnosis MM Multiple myeloma  HCT Type Autologous    Prep Regimen Cytoxan  Melphalan   Donor Source No data was found    GVHD Prophylaxis No  Primary BMT Provider Kevinsergo Ortiz was admitted on 5/16/2019 for autologous bone marrow transplant for MM.    INTERVAL  HISTORY     Tolerated melphalan without incident.  Feels good this morning.  No pain.  Cryotherapy completed with melphalan.      Review of Systems: 10 point ROS negative except as noted above.  # Pain Assessment:  Current Pain Score 5/17/2019   Patient currently in pain? denies   Pain descriptors -   Angel s pain level was assessed and he currently denies pain.        Scheduled Medications    acetaminophen  650 mg Oral Once     acyclovir  800 mg Oral 5x Daily     azithromycin  250 mg Oral Daily     calcium carbonate 500 mg (elemental)  1,000 mg Oral BID w/meals     dexamethasone  8 mg Oral Daily     diphenhydrAMINE  25 mg Oral Once     [START ON 5/22/2019] filgrastim (NEUPOGEN/GRANIX) intravenous  5 mcg/kg (Treatment Plan Recorded) Intravenous Daily at 8 pm     fluconazole  200 mg Oral Daily     heparin lock flush  5 mL Intravenous Q24H     omeprazole  20 mg Oral QAM AC     PARoxetine  20 mg Oral QAM     [START ON 6/17/2019] sulfamethoxazole-trimethoprim  1 tablet Oral Q Mon Tues BID     vitamin D3  2,000 Units Oral Daily     Current Facility-Administered Medications   Medication     acetaminophen (TYLENOL) tablet 325-650 mg     acetaminophen (TYLENOL) tablet 325-650 mg     acetaminophen (TYLENOL) tablet 650 mg     acyclovir (ZOVIRAX) tablet 800 mg     azithromycin (ZITHROMAX) tablet 250 mg     calcium carbonate 500 mg (elemental) (OSCAL;OYSTER SHELL CALCIUM) tablet 1,000 mg     ceFEPIme (MAXIPIME) 2 g vial to attach to  ml bag for ADULTS or 50 ml bag for PEDS     dexamethasone (DECADRON) tablet 8 mg     dextrose 5% and  0.45% NaCl infusion     diphenhydrAMINE (BENADRYL) capsule 25 mg     [START ON 5/22/2019] filgrastim 15 mcg/mL (in Dextrose) (NEUPOGEN) infusion 400 mcg     fluconazole (DIFLUCAN) tablet 200 mg     heparin lock flush 10 UNIT/ML injection 5 mL     heparin lock flush 10 UNIT/ML injection 5-10 mL     magnesium sulfate 4 g in 100 mL sterile water (premade)     meperidine (DEMEROL) injection 25-50 mg     omeprazole (priLOSEC) CR capsule 20 mg     PARoxetine (PAXIL) tablet 20 mg     potassium chloride (KLOR-CON) Packet 20-40 mEq     potassium chloride 10 mEq in 100 mL intermittent infusion with 10 mg lidocaine     potassium chloride 10 mEq in 100 mL sterile water intermittent infusion (premix)     potassium chloride 20 mEq in 50 mL intermittent infusion     potassium chloride ER (K-DUR/KLOR-CON M) CR tablet 20-40 mEq     potassium phosphate 15 mmol in D5W 250 mL intermittent infusion     potassium phosphate 20 mmol in D5W 250 mL intermittent infusion     potassium phosphate 20 mmol in D5W 500 mL intermittent infusion     potassium phosphate 25 mmol in D5W 500 mL intermittent infusion     prochlorperazine (COMPAZINE) injection 5-10 mg    Or     prochlorperazine (COMPAZINE) tablet 5-10 mg     [START ON 6/17/2019] sulfamethoxazole-trimethoprim (BACTRIM DS/SEPTRA DS) 800-160 MG per tablet 1 tablet     vitamin D3 (CHOLECALCIFEROL) 1000 units (25 mcg) tablet 2,000 Units     Facility-Administered Medications Ordered in Other Encounters   Medication     heparin 100 UNIT/ML injection 5 mL     heparin lock flush 10 UNIT/ML injection 5 mL       PHYSICAL EXAM     Weight In/Out     Wt Readings from Last 3 Encounters:   05/17/19 80.8 kg (178 lb 2.1 oz)   05/15/19 82.3 kg (181 lb 7 oz)   05/13/19 84.6 kg (186 lb 9.6 oz)      I/O last 3 completed shifts:  In: 1375 [I.V.:1375]  Out: -        KPS:  90    BP (!) 137/92 (BP Location: Right arm)   Pulse 96   Temp 98.1  F (36.7  C) (Oral)   Resp 18   Wt 80.8 kg (178 lb 2.1 oz)   SpO2 97%    BMI 26.23 kg/m         General: NAD   Eyes: : FARRAH, sclera anicteric   Nose/Mouth/Throat: OP clear, buccal mucosa moist, no ulcerations   Lungs: CTA bilaterally  Cardiovascular: RRR, no M/R/G   Abdominal/Rectal: +BS, soft, NT, ND, No HSM   Lymphatics: no edema  Skin: no rashes or petechaie  Neuro: A&O   Additional Findings: Hsieh site NT, no drainage.      LABS AND IMAGING - PAST 24 HOURS     Results for orders placed or performed during the hospital encounter of 05/16/19 (from the past 24 hour(s))   CBC with platelets differential   Result Value Ref Range    WBC 2.5 (L) 4.0 - 11.0 10e9/L    RBC Count 2.54 (L) 4.4 - 5.9 10e12/L    Hemoglobin 8.6 (L) 13.3 - 17.7 g/dL    Hematocrit 26.3 (L) 40.0 - 53.0 %     (H) 78 - 100 fl    MCH 33.9 (H) 26.5 - 33.0 pg    MCHC 32.7 31.5 - 36.5 g/dL    RDW 12.9 10.0 - 15.0 %    Platelet Count 104 (L) 150 - 450 10e9/L    Diff Method Manual Differential     % Neutrophils 74.8 %    % Lymphocytes 17.4 %    % Monocytes 7.8 %    % Eosinophils 0.0 %    % Basophils 0.0 %    Nucleated RBCs 1 (H) 0 /100    Absolute Neutrophil 1.9 1.6 - 8.3 10e9/L    Absolute Lymphocytes 0.4 (L) 0.8 - 5.3 10e9/L    Absolute Monocytes 0.2 0.0 - 1.3 10e9/L    Absolute Eosinophils 0.0 0.0 - 0.7 10e9/L    Absolute Basophils 0.0 0.0 - 0.2 10e9/L    Absolute Nucleated RBC 0.0     RBC Morphology Normal     Platelet Estimate Confirming automated cell count    Magnesium   Result Value Ref Range    Magnesium 1.9 1.6 - 2.3 mg/dL   Comprehensive metabolic panel   Result Value Ref Range    Sodium 141 133 - 144 mmol/L    Potassium 3.9 3.4 - 5.3 mmol/L    Chloride 107 94 - 109 mmol/L    Carbon Dioxide 28 20 - 32 mmol/L    Anion Gap 7 3 - 14 mmol/L    Glucose 164 (H) 70 - 99 mg/dL    Urea Nitrogen 11 7 - 30 mg/dL    Creatinine 0.83 0.66 - 1.25 mg/dL    GFR Estimate >90 >60 mL/min/[1.73_m2]    GFR Estimate If Black >90 >60 mL/min/[1.73_m2]    Calcium 8.5 8.5 - 10.1 mg/dL    Bilirubin Total 0.2 0.2 - 1.3 mg/dL     Albumin 3.2 (L) 3.4 - 5.0 g/dL    Protein Total 6.0 (L) 6.8 - 8.8 g/dL    Alkaline Phosphatase 49 40 - 150 U/L    ALT 18 0 - 70 U/L    AST 5 0 - 45 U/L         OVERALL PLAN   Angel Yanez is a 58 year old male with IgG Kappa MM D0 of 2nd auto BMT      1.  BMT/MM: Pending cell dose collected. If adequate,   Melphalan D-1. Allopurinol through day 0. Flush and pre-meds prior to transplant.  5/17 day 0, transplant. Cell dose is 0.639x10^6.    - GCSF starts d+5 and cont to until ANC>1500 x3 consecutive days. Re-stage per protocol.     2.  HEME: Keep Hgb>8 and plts>10K. No pre-meds.                            3.  ID: Afebrile.   - ACV prophy (800mg 5x/day)  -Bacterial prophy: hx of hives with moxifloxacin so will use azithro 250mg daily  -fluc prophy                                            4.  GI: Zofran and dexamethasone prior to chemo to prevent N/V. Ativan and compazine available PRN break-through symptoms.   -  GI prophy- omeprazole.      5.  FEN/Renal:   - transplant flush  - regular diet  - Monitor creat and lytes. Replete lytes PRN per SS. Monitor weight, I+O, lytes per protocol with IVF flush.     6. Pain: Tylenol, oxycodone prn pain.      Dispo: anticipate discharge day +1.  Medications ordered; blood product plan/bmt therapy plan ordered; clinic labs ordered; discharge summary and SOC shared.   Latrice MAXWELL Carrier  5/17/2019    Attending Summary  History and physical  Angel Yanez is a 58 year old male with MM s/p ASCT in 2005, followed by recent relapse, treated with VRD followed by Hiral/Tip/Dex.  Chemo-priming was attempted in 1/2019, but collection was deferred due to residual disease.  He is now s/p autologous marrow harvest.     Limited yield from autologous marrow harvest (~0.6 CD34 cells).  Understands the likelihood of delayed platelet engraftment.     On exam:  Head/mouth/neck: Oropharynx clear.  No lymphadenopathy.  Heart: Regular rate and rhythm.  No murmurs rubs or gallops.  Lungs: Lungs are  clear bilaterally.  Abdomen: Abdomen is soft nontender nondistended.  Intact bowel sounds.  Ext: No edema.  Skin: No rash.  [place] site bandaged.     Pertinent Labs:  Reviewed in full.     ASSESSMENT AND PLAN  1.  MM:  conditioning with melphalan.  Day 0.     MD Latrice Serrato

## 2019-05-17 NOTE — PROGRESS NOTES
Care Coordination     Line Company:  [facility] [phone] for line care supplies    Referral made for line care:  Yes  IV medications needed at discharge:  None   Pharmacy concerns:  None. (of note, vori requires prior auth)    PT/OT recommended:  No  Referral made for PT/OT:  NA    D/c location:  [facility]  Has placement need been communicated to Social Work?  NA    Teaching time arranged with family:  Completed 5/16  Notify nurse to schedule line care class/ DM teaching, prior to d/c:  No, Patient and caregiver previously received teaching, and decline further need     Medicare form required:  No    Caregiver:  Jennifer Yanez (wife) [phone]  Dalton Yanez (son) [phone]
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
## 2019-05-17 NOTE — SUMMARY OF CARE
BMT Hospital Discharge Summary of Care  Treatment Team:  Patient Care Team:  Ayana Love PA-C as PCP - General (Physician Assistant)  Adebayo Lucio MD as BMT Physician (Transplant)  Su Pate, RN as BMT Nurse Coordinator (Transplant)  Aliya Collado as Referring Physician (Internal Medicine)  Brooke Lunsford as Registered Nurse (Oncology)  Christie Obrien, MSW as   Janine Loco LICSW as  ( - Clinical)  Kody Mukherjee MD as MD (Hematology & Oncology)  Discharge Diagnosis: S/P BMT    Hospital Discharge on 05/17/19  Discharge Location home   Activity at Discharge Ad mickey   Nutrition Regular diet as tolerated     Blood Transfusion Parameters Transfuse if Hemoglobin < or equal 8 mg/dL  Red Blood Cell Order: 2 units, irradiated and leukoreduced   Transfuse if Platelet count < or equal 10,000 uL  Platelet order: 1 adult dose, irradiated and leukoreduced     Blood Counts Recent Labs   Lab Test 05/17/19  0403   WBC 2.5*   ANEU 1.9   ALYM 0.4*   NEGAR 0.2   AEOS 0.0   HGB 8.6*   HCT 26.3*   *      IV Medications Outpatient Pharmacy G-CSF to be given in clinic: (dose) 480 mcg subcutaneous daily starting 5/22 and continue until ANC > 2500 x 2 consecutive days.      Electrolyte Replacement  Parameters in Clinic Intravenous Electrolyte Replacement:    Potassium Chloride  Give only if serum creatinine <2. Reference range 3.4-5.3 mmol/L        3.0 - 3.3 mmol/L Oral: 40 mEq by mouth x 1  PIV/CVC on TPN: 30 mEq over 1 hour x 1 doses & 20 mEq by mouth  CVC NOT on TPN: 40 mEq IV x 1   2.5-2.9 mmol/L  Oral: 40 mEq by mouth every 2 hrs x 2  PIV/CVC on TPN: 30 mEq IV & 40 mEq by mouth   CVC NOT on TPN: 60 mEq IV        <2.5 PIV/CVC on TPN: 30 mEq IV & 40 mEq by mouth   CVC NOT on TPN: 60 mEq IV      Magnesium Sulfate  Reference range 1.6-2.3 mg/dl       1.2-1.5 mg/dl  Oral:400 mg by mouth twice daily x 2 days   IV: 2 gm over 1 hour        0.9-1.1 IV Only: 4 gm IV over 2  hours   < 0.9  Discretion of provider and pharmacist        Home Infusion  IV Medications through home infusion: None   Line Care Care: Hsieh - Flush each line with 5mL of 10 unit/mL heparin every 24 hours  Supplies Through: [facility]  Fax: [phone]  Ph: [phone]      Physical Therapy & Support Services None     Laboratory Tests at Next Clinic Visit Hemogram (CBC) differential, platelet count  Basic Metabolic Panel     Healthy Lifestyle Follow general guidelines for physical activity as recommended by the Office of Disease Prevention & Health Promotion (2017) www.health.gov/paguidelines:    Avoid Inactivity  Some physical activity is better than none -- and any amount has health benefits.    Do Aerobic Activity  For substantial health benefits, work up to:    150 minutes (2 hours and 30 minutes) each week of moderate-intensity aerobic physical activity (such as brisk walking or tennis)    Do aerobic physical activity in episodes of at least 10 minutes and, if possible, spread it out through the week.  For even greater health benefits, do one of the following:    Doing more will lead to even greater health benefits.    Strengthen Muscles  Do muscle-strengthening activities that involve all major muscle groups on 2 or more days a week.    Healthy Lifestyle    Eat a healthy diet with a wide variety of foods    Don t smoke (passive or active exposure), chew tobacco, or use illegal drugs.     Discuss drinking alcohol with your provider. If cleared to use alcohol, do so in moderation, generally less than two drinks per day.     Maintain a healthy weight    Avoid excessive sun exposure and wear sunscreen protection for anticipated periods of long exposure.    Information above directly cited from these sources:  1. Eric NCaryl S., YONAS Turner, SHUKRI Steel, ALISSA Montez, BELKIS Pérez., RONY Estrada.,   Lorie, KCaryl M. (2013). Prevalence of Hematopoietic Cell Transplant Survivors in the [place]. Biology of  Blood and Marrow Transplantation?: Journal of the American Society for Blood and Marrow Transplantation, 19(10), 9364-2803. doi   10.1016/j.bbmt.2013.07.020  2. Office of Disease Prevention and Health Promotion. (2017). Physical activity   guidelines. Retrieved from https://health.gov/paguidelines/.     When to Call  (Refer to Discharge Teaching Materials)   Fever > 100.5 F    Bleeding that does not stop after a few minutes    Severe nausea, vomiting, or diarrhea     New fall or injury    Change in designated caregiver    Medication question    Any other urgent concern   Follow Up Visits BMT Clinic Appointment Date/Time:   BMT Clinic (date, time, provider): 5/19/2019, check in at 9am.       Hospital Discharge Medications:   Guille Yanez   Home Medication Instructions VAIBHAV:[id number]    Printed on:05/17/19 1135   Medication Information                      acetaminophen (TYLENOL) 325 MG tablet  Take 1-2 tablets (325-650 mg) by mouth every 4 hours as needed for mild pain             acyclovir (ZOVIRAX) 800 MG tablet  Take 1 tablet (800 mg) by mouth 5 times daily             azithromycin (ZITHROMAX) 250 MG tablet  Take 1 tablet (250 mg) by mouth daily             calcipotriene (DOVONOX) 0.005 % SOLN solution  Apply topically 2 times daily as needed              calcium carbonate (CALCIUM CARBONATE) 600 MG tablet  Take 2 tablets by mouth daily              Cholecalciferol (VITAMIN D3) 2000 units CAPS  Take 2,000 Units by mouth daily              fluconazole (DIFLUCAN) 200 MG tablet  Take 1 tablet (200 mg) by mouth daily             loratadine (CLARITIN) 10 MG tablet  Take 10 mg by mouth daily             omeprazole (PRILOSEC OTC) 20 MG tablet  Take 20 mg by mouth daily              PARoxetine (PAXIL) 20 MG tablet  Take 20 mg by mouth every morning             prednisoLONE acetate (PRED FORTE) 1 % ophthalmic susp  Place 1-2 drops into both eyes every 4 hours as needed for dry eyes              prochlorperazine  (COMPAZINE) 5 MG tablet  Take 1-2 tablets (5-10 mg) by mouth every 6 hours as needed for nausea or vomiting             sulfamethoxazole-trimethoprim (BACTRIM DS/SEPTRA DS) 800-160 MG tablet  Take 1 tablet by mouth Every Mon, Tues two times daily

## 2019-05-18 ENCOUNTER — HOME INFUSION (PRE-WILLOW HOME INFUSION) (OUTPATIENT)
Dept: PHARMACY | Facility: CLINIC | Age: 59
End: 2019-05-18

## 2019-05-18 VITALS
DIASTOLIC BLOOD PRESSURE: 84 MMHG | SYSTOLIC BLOOD PRESSURE: 130 MMHG | HEART RATE: 68 BPM | TEMPERATURE: 97.7 F | BODY MASS INDEX: 26.66 KG/M2 | RESPIRATION RATE: 16 BRPM | OXYGEN SATURATION: 98 % | WEIGHT: 181 LBS

## 2019-05-18 LAB
ABO + RH BLD: ABNORMAL
ABO + RH BLD: ABNORMAL
ANION GAP SERPL CALCULATED.3IONS-SCNC: 5 MMOL/L (ref 3–14)
BASOPHILS # BLD AUTO: 0 10E9/L (ref 0–0.2)
BASOPHILS NFR BLD AUTO: 0 %
BLD GP AB SCN SERPL QL: ABNORMAL
BLD PROD TYP BPU: ABNORMAL
BLD PROD TYP BPU: NORMAL
BLD UNIT ID BPU: 0
BLOOD BANK CMNT PATIENT-IMP: ABNORMAL
BLOOD BANK CMNT PATIENT-IMP: ABNORMAL
BLOOD PRODUCT CODE: NORMAL
BPU ID: NORMAL
BUN SERPL-MCNC: 23 MG/DL (ref 7–30)
CALCIUM SERPL-MCNC: 8.1 MG/DL (ref 8.5–10.1)
CHLORIDE SERPL-SCNC: 110 MMOL/L (ref 94–109)
CO2 SERPL-SCNC: 29 MMOL/L (ref 20–32)
CREAT SERPL-MCNC: 1.02 MG/DL (ref 0.66–1.25)
DIFFERENTIAL METHOD BLD: ABNORMAL
EOSINOPHIL # BLD AUTO: 0 10E9/L (ref 0–0.7)
EOSINOPHIL NFR BLD AUTO: 0 %
ERYTHROCYTE [DISTWIDTH] IN BLOOD BY AUTOMATED COUNT: 13.2 % (ref 10–15)
GFR SERPL CREATININE-BSD FRML MDRD: 79 ML/MIN/{1.73_M2}
GLUCOSE SERPL-MCNC: 155 MG/DL (ref 70–99)
HCT VFR BLD AUTO: 24.4 % (ref 40–53)
HGB BLD-MCNC: 8.1 G/DL (ref 13.3–17.7)
LYMPHOCYTES # BLD AUTO: 0 10E9/L (ref 0.8–5.3)
LYMPHOCYTES NFR BLD AUTO: 0 %
MACROCYTES BLD QL SMEAR: PRESENT
MCH RBC QN AUTO: 33.5 PG (ref 26.5–33)
MCHC RBC AUTO-ENTMCNC: 33.2 G/DL (ref 31.5–36.5)
MCV RBC AUTO: 101 FL (ref 78–100)
MONOCYTES # BLD AUTO: 0.6 10E9/L (ref 0–1.3)
MONOCYTES NFR BLD AUTO: 6.3 %
NEUTROPHILS # BLD AUTO: 8.9 10E9/L (ref 1.6–8.3)
NEUTROPHILS NFR BLD AUTO: 93.7 %
NUM BPU REQUESTED: 1
PLATELET # BLD AUTO: 115 10E9/L (ref 150–450)
PLATELET # BLD EST: ABNORMAL 10*3/UL
POTASSIUM SERPL-SCNC: 3.8 MMOL/L (ref 3.4–5.3)
RBC # BLD AUTO: 2.42 10E12/L (ref 4.4–5.9)
SODIUM SERPL-SCNC: 143 MMOL/L (ref 133–144)
SPECIMEN EXP DATE BLD: ABNORMAL
TRANSFUSION STATUS PATIENT QL: NORMAL
TRANSFUSION STATUS PATIENT QL: NORMAL
WBC # BLD AUTO: 9.5 10E9/L (ref 4–11)

## 2019-05-18 PROCEDURE — 25000132 ZZH RX MED GY IP 250 OP 250 PS 637: Performed by: INTERNAL MEDICINE

## 2019-05-18 PROCEDURE — 25000131 ZZH RX MED GY IP 250 OP 636 PS 637: Performed by: PHYSICIAN ASSISTANT

## 2019-05-18 PROCEDURE — 25000132 ZZH RX MED GY IP 250 OP 250 PS 637: Performed by: PHYSICIAN ASSISTANT

## 2019-05-18 PROCEDURE — 25000128 H RX IP 250 OP 636: Performed by: PHYSICIAN ASSISTANT

## 2019-05-18 PROCEDURE — 80048 BASIC METABOLIC PNL TOTAL CA: CPT | Performed by: PHYSICIAN ASSISTANT

## 2019-05-18 PROCEDURE — P9040 RBC LEUKOREDUCED IRRADIATED: HCPCS | Performed by: PHYSICIAN ASSISTANT

## 2019-05-18 PROCEDURE — 85025 COMPLETE CBC W/AUTO DIFF WBC: CPT | Performed by: PHYSICIAN ASSISTANT

## 2019-05-18 RX ORDER — FUROSEMIDE 10 MG/ML
20 INJECTION INTRAMUSCULAR; INTRAVENOUS ONCE
Status: COMPLETED | OUTPATIENT
Start: 2019-05-18 | End: 2019-05-18

## 2019-05-18 RX ORDER — ACYCLOVIR 800 MG/1
800 TABLET ORAL 2 TIMES DAILY
Status: DISCONTINUED | OUTPATIENT
Start: 2019-05-18 | End: 2019-05-18 | Stop reason: HOSPADM

## 2019-05-18 RX ADMIN — OMEPRAZOLE 20 MG: 20 CAPSULE, DELAYED RELEASE ORAL at 07:58

## 2019-05-18 RX ADMIN — Medication 5 ML: at 12:52

## 2019-05-18 RX ADMIN — PAROXETINE HYDROCHLORIDE HEMIHYDRATE 20 MG: 20 TABLET, FILM COATED ORAL at 08:08

## 2019-05-18 RX ADMIN — VITAMIN D, TAB 1000IU (100/BT) 2000 UNITS: 25 TAB at 07:58

## 2019-05-18 RX ADMIN — AZITHROMYCIN 250 MG: 250 TABLET, FILM COATED ORAL at 07:58

## 2019-05-18 RX ADMIN — FLUCONAZOLE 200 MG: 200 TABLET ORAL at 07:58

## 2019-05-18 RX ADMIN — ACYCLOVIR 800 MG: 800 TABLET ORAL at 10:53

## 2019-05-18 RX ADMIN — FUROSEMIDE 20 MG: 10 INJECTION, SOLUTION INTRAVENOUS at 10:08

## 2019-05-18 RX ADMIN — DEXAMETHASONE 8 MG: 4 TABLET ORAL at 07:58

## 2019-05-18 RX ADMIN — Medication 5 ML: at 08:11

## 2019-05-18 RX ADMIN — CALCIUM 1000 MG: 500 TABLET ORAL at 07:58

## 2019-05-18 RX ADMIN — ACYCLOVIR 800 MG: 800 TABLET ORAL at 07:58

## 2019-05-18 ASSESSMENT — ACTIVITIES OF DAILY LIVING (ADL)
ADLS_ACUITY_SCORE: 11

## 2019-05-18 NOTE — PROCEDURES
BMT Post Infusion Documentation    Data   Patient Vitals for the past 72 hrs:   Temp Temp src Pulse Resp Heart Rate BP   05/16/19 0750 98.4  F (36.9  C) Oral -- 20 87 138/81   05/16/19 1015 -- -- -- 18 81 136/82   05/16/19 1030 -- -- -- 16 72 155/82   05/16/19 1222 98.8  F (37.1  C) Oral -- 16 68 149/87   05/16/19 1547 98.8  F (37.1  C) Oral 89 16 -- 117/77   05/16/19 1956 98.8  F (37.1  C) Axillary -- 16 75 127/80   05/17/19 0032 97.8  F (36.6  C) Axillary 80 16 -- 129/82   05/17/19 0353 98.2  F (36.8  C) Axillary 83 16 -- 122/85   05/17/19 0815 98.5  F (36.9  C) Oral -- 18 85 125/81   05/17/19 1235 98.1  F (36.7  C) Oral 96 18 -- (!) 137/92   05/17/19 1326 -- -- -- -- -- 131/79   05/17/19 1542 97.9  F (36.6  C) Oral 82 16 76 129/82   05/17/19 1953 98  F (36.7  C) Oral 81 16 81 138/80   05/17/19 2036 97.6  F (36.4  C) Oral 72 16 -- 133/74   05/17/19 2059 -- -- 73 16 -- 135/86   05/17/19 2100 -- -- 67 -- -- 135/86   05/17/19 2111 97.9  F (36.6  C) -- 74 18 -- 129/86   05/17/19 2120 98.6  F (37  C) Oral 74 18 -- (!) 135/95   05/17/19 2128 98.6  F (37  C) Oral 69 18 -- 132/84   05/17/19 2139 -- -- 71 18 -- (!) 138/93   05/17/19 2150 97.6  F (36.4  C) Oral 71 16 -- (!) 140/99   05/17/19 2159 97.8  F (36.6  C) Oral 68 18 -- (!) 146/95   05/17/19 2200 -- -- 68 -- -- (!) 146/95   05/17/19 2354 98.2  F (36.8  C) Axillary -- 18 77 (!) 152/96   05/18/19 0419 96.7  F (35.9  C) Axillary -- 18 79 147/88        BMT INFUSION DOCUMENTATION (last 24 hours)      BMT/Cellular Product Infusion     Row Name 05/17/19 2000 05/17/19 1900             Cell Therapy Documentation    Product Release Date  --  05/17/19  -EV      Recipient Study ID  --  N/A  -EV      Donor  --  Autologous  -EV      Donor MRN/ID  --  [id number]  -EV      Donor ABO/Rh  --  O pos  -EV      Allogeneic Donor Eligibility Determination and Summary of Records  --  N/A - Autologous  -EV      Type of Infusion  --  Autologous  -EV      Total Volume Dispensed (mL)  --  236   -EV      Total NC Dose  --  2.25E+08  -EV      Total CD34 Dose  --  6.39E+05  -EV      Total CD3 dose  --  N/A  -EV      Total NC Dose Left in Storage  --  NONE  -EV      Comments for Product Issues  --  N/A  -EV      Donor ABO/Rh  --  --      Product Types  --  --      Product Numbers  --  --      Product Types and Numbers  --  --      Volume  --  --      ABO Mismatch  --  --      ZZTotal NC Dose  --  --      ZZTotal CD34 Dose  --  --      ZZTotal NC Dose Left in Storage  --  --         [REMOVED] Product 05/17/19 1921 HPC, Marrow    Product Details Product Release Date: 05/17/19  -EV Product Release Time: 1921 -SY Product Type: HPC, Marrow  -EV DIN: [id number]  -EV Product Description Code: [id number]  -EV Volume Dispensed (mL): 118 mL  -EV Completion Date (RN to complete): 05/17/19  -DS Completion Time (RN to complete): 2110 -DS    Checked by (Patient RN)  --  roseann hernandez rn  -DS      Checked by (Witness)  ANANYA PANG  ANANYA ALIA  -DS      Product Volume Infused (mL)  --  118 mL  -DS      Flush Volume (mL)  --  140 mL  -DS      Volume Dispensed (mL)  --  --         [REMOVED] Product 05/17/19 1921 HPC, Marrow    Product Details Product Release Date: 05/17/19  -EV Product Release Time: 1921 -SY Product Type: HPC, Marrow  -EV DIN: [id number]  -EV Product Description Code: [id number]  -EV Volume Dispensed (mL): 118 mL  -EV Completion Date (RN to complete): 05/17/19  -DS Completion Time (RN to complete): 2150 -DS    Checked by (Patient RN)  --  roseann hernandez  -DS      Checked by (Witness)  ANANYA ALIA  -LYNETTE  ANANYA ALIA  -DS      Product Volume Infused (mL)  --  118 mL  -DS      Flush Volume (mL)  --  150 mL  -DS      Volume Dispensed (mL)  --  --         RN Documentation    Patient was premedicated as ordered  --  yes  -DS      Line Type  --  central line, right  -DS      Patient Stable Prior to Infusion  --  yes  -DS      Time Infusion Started  --  2055  -DS      Checked by (Patient RN)  --  --       Checked by (RN 2)  --  --      Broken Bag?  --  --      Immediate suspected transfusion reaction to the product  --  --      Time Infusion Stopped  --  --      Total Flush Volume (mL)  --  --      Checked by (Witness)  --  --      Date Infusion Started  --  --      Date Infusion Stopped  --  --      Volume Infused (mL)  --  --      Total Volume Infused (cc)  --  --         Patient tolerance of product infusion    Immediate suspected transfusion reaction to the product  --  --      Did patient have prior history of similar signs/symptoms during this hospitalization?  --  --      Symptoms during/after infusion  --  --      Did the patient tolerate the infusion well  --  --      Medications and treatment for symptoms  --  --      Did the symptoms resolve?  --  --      Enter comments if clots, leaks, broken bag, infusion delays, other issues with bag/infusion  --  --      Describe symptoms  --  --        User Key  (r) = Recorded By, (t) = Taken By, (c) = Cosigned By    Initials Name Effective Dates    Johana Sheridan RN 04/29/14 -     Jadyn Calix 04/29/14 -     Brooke Briceno 04/29/14 -             Post-Infusion Evaluation:   Infusion Related Reaction: Grade 0 - none  Dyspnea: Grade 0 - none  Hypoxia: Grade 0 - not present  Fever: Grade 0 - afebrile  Chills: Grade 0 - none  Febrile Neutropenia: Grade 0 - not present  Sinus Bradycardia: Grade 0 - none  Hypertension: Grade 2 - stage 1 hypertension (systolic -159 mm Hg or diastolic BP 90-99 mm Hg); medical intervention indicated; recurrent or persistent (>/ 24 hours); symptomatic increase by >/ 20 mm Hg (diastolic) or to > 140/90 mm Hg if previously WNL; monotherapy indicated  Hypotension: Grade 0 - none  Chest Pain: Grade 0 - none  Bronchospasm: Grade 0 - none  Pain: Grade 0 - none  Rash: Grade 0 - None  Neurologic Specify: none    If this was a cord blood transplant, was more than one cord blood unit infused? no    Funmi Ospina PA-C

## 2019-05-18 NOTE — PROGRESS NOTES
BMT Daily Progress Note   05/18/2019    Patient ID:  Angel Yanez is a 58 year old male, currently day +1 of his HCT.     Diagnosis MM Multiple myeloma  HCT Type Autologous    Prep Regimen Cytoxan  Melphalan   Donor Source No data was found    GVHD Prophylaxis No  Primary BMT Provider Naveed     Angel was admitted on 5/16/2019 for autologous bone marrow transplant for MM.    INTERVAL  HISTORY     Tolerated infusion of cells with some hematuria and hypertension  Feels good this morning.  No n,v,d. No bleeding.  Will give 1 unit of RBC today before discharge.  Give some lasix as well.    Review of Systems: 10 point ROS negative except as noted above.  # Pain Assessment:  Current Pain Score 5/18/2019   Patient currently in pain? denies   Pain descriptors -   Angel s pain level was assessed and he currently denies pain.        Scheduled Medications    acyclovir  800 mg Oral BID     azithromycin  250 mg Oral Daily     calcium carbonate 500 mg (elemental)  1,000 mg Oral BID w/meals     [START ON 5/22/2019] filgrastim (NEUPOGEN/GRANIX) intravenous  5 mcg/kg (Treatment Plan Recorded) Intravenous Daily at 8 pm     fluconazole  200 mg Oral Daily     heparin lock flush  5 mL Intravenous Q24H     omeprazole  20 mg Oral QAM AC     PARoxetine  20 mg Oral QAM     [START ON 6/17/2019] sulfamethoxazole-trimethoprim  1 tablet Oral Q Mon Tues BID     vitamin D3  2,000 Units Oral Daily     Current Facility-Administered Medications   Medication     acetaminophen (TYLENOL) tablet 325-650 mg     acetaminophen (TYLENOL) tablet 325-650 mg     acyclovir (ZOVIRAX) tablet 800 mg     azithromycin (ZITHROMAX) tablet 250 mg     calcium carbonate 500 mg (elemental) (OSCAL;OYSTER SHELL CALCIUM) tablet 1,000 mg     ceFEPIme (MAXIPIME) 2 g vial to attach to  ml bag for ADULTS or 50 ml bag for PEDS     [START ON 5/22/2019] filgrastim 15 mcg/mL (in Dextrose) (NEUPOGEN) infusion 400 mcg     fluconazole (DIFLUCAN) tablet 200 mg     heparin  lock flush 10 UNIT/ML injection 5 mL     heparin lock flush 10 UNIT/ML injection 5-10 mL     hydrALAZINE (APRESOLINE) injection 10 mg     magnesium sulfate 4 g in 100 mL sterile water (premade)     omeprazole (priLOSEC) CR capsule 20 mg     PARoxetine (PAXIL) tablet 20 mg     potassium chloride (KLOR-CON) Packet 20-40 mEq     potassium chloride 10 mEq in 100 mL intermittent infusion with 10 mg lidocaine     potassium chloride 10 mEq in 100 mL sterile water intermittent infusion (premix)     potassium chloride 20 mEq in 50 mL intermittent infusion     potassium chloride ER (K-DUR/KLOR-CON M) CR tablet 20-40 mEq     potassium phosphate 15 mmol in D5W 250 mL intermittent infusion     potassium phosphate 20 mmol in D5W 250 mL intermittent infusion     potassium phosphate 20 mmol in D5W 500 mL intermittent infusion     potassium phosphate 25 mmol in D5W 500 mL intermittent infusion     prochlorperazine (COMPAZINE) injection 5-10 mg    Or     prochlorperazine (COMPAZINE) tablet 5-10 mg     [START ON 6/17/2019] sulfamethoxazole-trimethoprim (BACTRIM DS/SEPTRA DS) 800-160 MG per tablet 1 tablet     vitamin D3 (CHOLECALCIFEROL) 1000 units (25 mcg) tablet 2,000 Units     Current Outpatient Medications   Medication     acetaminophen (TYLENOL) 325 MG tablet     acyclovir (ZOVIRAX) 800 MG tablet     azithromycin (ZITHROMAX) 250 MG tablet     calcium carbonate (CALCIUM CARBONATE) 600 MG tablet     Cholecalciferol (VITAMIN D3) 2000 units CAPS     fluconazole (DIFLUCAN) 200 MG tablet     loratadine (CLARITIN) 10 MG tablet     omeprazole (PRILOSEC OTC) 20 MG tablet     PARoxetine (PAXIL) 20 MG tablet     prochlorperazine (COMPAZINE) 5 MG tablet     [START ON 6/17/2019] sulfamethoxazole-trimethoprim (BACTRIM DS/SEPTRA DS) 800-160 MG tablet     calcipotriene (DOVONOX) 0.005 % SOLN solution     prednisoLONE acetate (PRED FORTE) 1 % ophthalmic susp     Facility-Administered Medications Ordered in Other Encounters   Medication      heparin 100 UNIT/ML injection 5 mL     heparin lock flush 10 UNIT/ML injection 5 mL       PHYSICAL EXAM     Weight In/Out     Wt Readings from Last 3 Encounters:   05/18/19 82.1 kg (181 lb)   05/15/19 82.3 kg (181 lb 7 oz)   05/13/19 84.6 kg (186 lb 9.6 oz)      I/O last 3 completed shifts:  In: 6956 [P.O.:2040; I.V.:4680; Blood:236]  Out: 3330 [Urine:3330]           /84 (BP Location: Right arm)   Pulse 68   Temp 97.7  F (36.5  C) (Axillary)   Resp 16   Wt 82.1 kg (181 lb)   SpO2 98%   BMI 26.66 kg/m         General: NAD   Eyes: : FARRAH, sclera anicteric   Nose/Mouth/Throat: OP clear, buccal mucosa moist, no ulcerations   Lungs: CTA bilaterally  Cardiovascular: RRR, no M/R/G   Abdominal/Rectal: +BS, soft, NT, ND, No HSM   Lymphatics: no edema  Skin: no rashes or petechaie  Neuro: A&O   Additional Findings: Hsieh site NT, no drainage.      LABS AND IMAGING - PAST 24 HOURS     Results for orders placed or performed during the hospital encounter of 05/16/19 (from the past 24 hour(s))   Potassium   Result Value Ref Range    Potassium 3.8 3.4 - 5.3 mmol/L   Basic metabolic panel   Result Value Ref Range    Sodium 143 133 - 144 mmol/L    Potassium 3.8 3.4 - 5.3 mmol/L    Chloride 110 (H) 94 - 109 mmol/L    Carbon Dioxide 29 20 - 32 mmol/L    Anion Gap 5 3 - 14 mmol/L    Glucose 155 (H) 70 - 99 mg/dL    Urea Nitrogen 23 7 - 30 mg/dL    Creatinine 1.02 0.66 - 1.25 mg/dL    GFR Estimate 79 >60 mL/min/[1.73_m2]    GFR Estimate If Black >90 >60 mL/min/[1.73_m2]    Calcium 8.1 (L) 8.5 - 10.1 mg/dL   CBC with platelets differential   Result Value Ref Range    WBC 9.5 4.0 - 11.0 10e9/L    RBC Count 2.42 (L) 4.4 - 5.9 10e12/L    Hemoglobin 8.1 (L) 13.3 - 17.7 g/dL    Hematocrit 24.4 (L) 40.0 - 53.0 %     (H) 78 - 100 fl    MCH 33.5 (H) 26.5 - 33.0 pg    MCHC 33.2 31.5 - 36.5 g/dL    RDW 13.2 10.0 - 15.0 %    Platelet Count 115 (L) 150 - 450 10e9/L    Diff Method Manual Differential     % Neutrophils 93.7 %    %  Lymphocytes 0.0 %    % Monocytes 6.3 %    % Eosinophils 0.0 %    % Basophils 0.0 %    Absolute Neutrophil 8.9 (H) 1.6 - 8.3 10e9/L    Absolute Lymphocytes 0.0 (L) 0.8 - 5.3 10e9/L    Absolute Monocytes 0.6 0.0 - 1.3 10e9/L    Absolute Eosinophils 0.0 0.0 - 0.7 10e9/L    Absolute Basophils 0.0 0.0 - 0.2 10e9/L    Macrocytes Present     Platelet Estimate Confirming automated cell count          OVERALL PLAN   Angel Yanez is a 58 year old male with IgG Kappa MM D0 of 2nd auto BMT      1.  BMT/MM: Received Melphalan D-1. Allopurinol complete .  Completed flush with transplant.  5/17 day 0, transplant. Cell dose is 0.639x10^6.  Recent bone  Marrow [place] - known poor cell dose as failed chemo-mobilization 1/2019.       - GCSF starts d+5(5/22/2019) and cont to until ANC>2500 x2 consecutive days. Re-stage per protocol.     2.  HEME: Keep Hgb>8 and plts>10K. No pre-meds.  Not yet neutropenic.  Give RBC today for hgb=8.1 before discharge.                            3.  ID: Afebrile.   - ACV prophy (800mg 5x/day)  -Bacterial prophy: hx of hives with moxifloxacin so will use azithro 250mg daily  -fluc prophy                                            4.  GI: Zofran and dexamethasone prior to chemo to prevent N/V is complete. Ativan and compazine available PRN break-through symptoms. Sent home with compazine prn incase has nausea.  -  GI prophy- omeprazole.      5.  FEN/Renal:   -Lasix today before discharge.  Weight up from flush from 80.8 to 82.6  - transplant flush complete  - regular diet  - Monitor creat and lytes. Replete lytes PRN per SS. Monitor weight, I+O   6. Pain: Tylenol, oxycodone prn pain.      Dispo: discharge today, day +1.  Has appointment in BMT clinic tomorrow.  Funmi Ospina PA-C  [id number]  Attending Summary  Day +1 s/p [id number] ASCT.     On exam:  Head/mouth/neck: Oropharynx clear.  No lymphadenopathy.  Heart: Regular rate and rhythm.  No murmurs rubs or gallops.  Lungs: Lungs are clear  bilaterally.  Abdomen: Abdomen is soft nontender nondistended.  Intact bowel sounds.  Ext: No edema.  Skin: No rash.  [place] site bandaged.     Pertinent Labs:  Reviewed in full.     ASSESSMENT AND PLAN  1.  MM:  conditioning with melphalan.  Day+1.  2.  Reduce ACY to 800 BID to reduce risk of myelosuppression given graft cell dose.  We will not completely stop it given prior history of shingles.  3.  Discharge to home today.  Clinic follow up scheduled.     Anuel Lanier MD

## 2019-05-18 NOTE — PLAN OF CARE
/88 (BP Location: Right arm)   Pulse 68   Temp 96.7  F (35.9  C) (Axillary)   Resp 18   Wt 82.6 kg (182 lb 1.6 oz)   SpO2 98%   BMI 26.82 kg/m      VSS. BP elevated overnight but did not require medication. No complaints of N/V/D. Flush is infusing at 200 ml/hr per orders.  Pt is AOx4 and independent. No replacements needed this Am.      Problem: Adult Inpatient Plan of Care  Goal: Plan of Care Review  Outcome: No Change     Problem: Adjustment to Transplant (Stem Cell/Bone Marrow Transplant)  Goal: Optimal Coping with Transplant  Outcome: No Change

## 2019-05-18 NOTE — PLAN OF CARE
Problem: Adult Inpatient Plan of Care  Goal: Plan of Care Review  5/18/2019 1559 by Chanel Perez RN  Outcome: Adequate for Discharge  Goal: Patient-Specific Goal (Individualization)  5/18/2019 1559 by Chanel Perez RN  Outcome: Adequate for Discharge  Goal: Absence of Hospital-Acquired Illness or Injury  5/18/2019 1559 by Chanel Perez RN  Outcome: Adequate for Discharge  Goal: Optimal Comfort and Wellbeing  5/18/2019 1559 by Chanel Perez RN  Outcome: Adequate for Discharge  Goal: Readiness for Transition of Care  5/18/2019 1559 by Chanel Perez RN  Outcome: Adequate for Discharge  Goal: Rounds/Family Conference  5/18/2019 1559 by Chanel Perez RN  Outcome: Adequate for Discharge  Vss. No pain. No nausea. Transplant ended at 8 am per the provider. Will receive gcsf day +5. Weight was up and received lasix. Received red blood cells. Medium stool and stool sent for research. Independent.

## 2019-05-18 NOTE — PLAN OF CARE
bone marrow transplant  s-pt with iv fluids at 200cc hrly since 1130-at approx 1900 wt was done since uop was not saved completely thruout the day. Up 4 lbs since am wt. Iv lasix given 20mg to balance out fluids. Lungs clear-premeds givenat 1715 and repeated again at 2020 due to delayed transplant time. transplant started at approx 2055 and 2 bags infused completing transplant process at 2150. vss stable -mild hypertension at end of transplant.  290cc ns total used to flush product thru line. Iv to continue at 200cc hrluy until 1000 5/18. Wife at bedside supportive at the start of transplant until end.   b-day 0 or transplant process  A-pt stable thruout transplant. Instructed on possible delayed reactions 1-2 hrs after. Instructed to call with any subjective changes. Vitals to be done per routine approx 2300.  r-monitor for delayed reactions. Hyralzine prn available if BP increases more. See paramters. Monitor uop and lung status.

## 2019-05-19 ENCOUNTER — ONCOLOGY VISIT (OUTPATIENT)
Dept: TRANSPLANT | Facility: CLINIC | Age: 59
End: 2019-05-19
Attending: NURSE PRACTITIONER
Payer: COMMERCIAL

## 2019-05-19 ENCOUNTER — APPOINTMENT (OUTPATIENT)
Dept: LAB | Facility: CLINIC | Age: 59
End: 2019-05-19
Attending: NURSE PRACTITIONER
Payer: COMMERCIAL

## 2019-05-19 VITALS
HEART RATE: 110 BPM | BODY MASS INDEX: 26.67 KG/M2 | RESPIRATION RATE: 16 BRPM | DIASTOLIC BLOOD PRESSURE: 80 MMHG | TEMPERATURE: 98.5 F | OXYGEN SATURATION: 98 % | WEIGHT: 181.1 LBS | SYSTOLIC BLOOD PRESSURE: 125 MMHG

## 2019-05-19 DIAGNOSIS — C90.00 MULTIPLE MYELOMA NOT HAVING ACHIEVED REMISSION (H): ICD-10-CM

## 2019-05-19 LAB
ANION GAP SERPL CALCULATED.3IONS-SCNC: 8 MMOL/L (ref 3–14)
BASOPHILS # BLD AUTO: 0 10E9/L (ref 0–0.2)
BASOPHILS NFR BLD AUTO: 0 %
BUN SERPL-MCNC: 27 MG/DL (ref 7–30)
CALCIUM SERPL-MCNC: 8.4 MG/DL (ref 8.5–10.1)
CHLORIDE SERPL-SCNC: 109 MMOL/L (ref 94–109)
CO2 SERPL-SCNC: 24 MMOL/L (ref 20–32)
CREAT SERPL-MCNC: 0.86 MG/DL (ref 0.66–1.25)
DIFFERENTIAL METHOD BLD: ABNORMAL
EOSINOPHIL # BLD AUTO: 0 10E9/L (ref 0–0.7)
EOSINOPHIL NFR BLD AUTO: 0 %
ERYTHROCYTE [DISTWIDTH] IN BLOOD BY AUTOMATED COUNT: 14.7 % (ref 10–15)
GFR SERPL CREATININE-BSD FRML MDRD: >90 ML/MIN/{1.73_M2}
GLUCOSE SERPL-MCNC: 195 MG/DL (ref 70–99)
HCT VFR BLD AUTO: 30 % (ref 40–53)
HGB BLD-MCNC: 10.2 G/DL (ref 13.3–17.7)
IMM GRANULOCYTES # BLD: 0 10E9/L (ref 0–0.4)
IMM GRANULOCYTES NFR BLD: 0.6 %
LYMPHOCYTES # BLD AUTO: 0.1 10E9/L (ref 0.8–5.3)
LYMPHOCYTES NFR BLD AUTO: 0.8 %
MCH RBC QN AUTO: 33.7 PG (ref 26.5–33)
MCHC RBC AUTO-ENTMCNC: 34 G/DL (ref 31.5–36.5)
MCV RBC AUTO: 99 FL (ref 78–100)
MONOCYTES # BLD AUTO: 0.1 10E9/L (ref 0–1.3)
MONOCYTES NFR BLD AUTO: 1.4 %
NEUTROPHILS # BLD AUTO: 6.3 10E9/L (ref 1.6–8.3)
NEUTROPHILS NFR BLD AUTO: 97.2 %
NRBC # BLD AUTO: 0 10*3/UL
NRBC BLD AUTO-RTO: 0 /100
PLATELET # BLD AUTO: 111 10E9/L (ref 150–450)
POTASSIUM SERPL-SCNC: 3.5 MMOL/L (ref 3.4–5.3)
RBC # BLD AUTO: 3.03 10E12/L (ref 4.4–5.9)
SODIUM SERPL-SCNC: 142 MMOL/L (ref 133–144)
WBC # BLD AUTO: 6.5 10E9/L (ref 4–11)

## 2019-05-19 PROCEDURE — 36592 COLLECT BLOOD FROM PICC: CPT

## 2019-05-19 PROCEDURE — 25000128 H RX IP 250 OP 636: Mod: ZF | Performed by: NURSE PRACTITIONER

## 2019-05-19 PROCEDURE — 85025 COMPLETE CBC W/AUTO DIFF WBC: CPT | Performed by: PHYSICIAN ASSISTANT

## 2019-05-19 PROCEDURE — 80048 BASIC METABOLIC PNL TOTAL CA: CPT | Performed by: PHYSICIAN ASSISTANT

## 2019-05-19 RX ORDER — HEPARIN SODIUM,PORCINE 10 UNIT/ML
5 VIAL (ML) INTRAVENOUS
Status: DISCONTINUED | OUTPATIENT
Start: 2019-05-19 | End: 2019-05-19 | Stop reason: HOSPADM

## 2019-05-19 RX ADMIN — HEPARIN, PORCINE (PF) 10 UNIT/ML INTRAVENOUS SYRINGE 5 ML: at 09:05

## 2019-05-19 ASSESSMENT — PAIN SCALES - GENERAL: PAINLEVEL: NO PAIN (0)

## 2019-05-19 NOTE — NURSING NOTE
Chief Complaint   Patient presents with     Blood Draw     Labs drawn via CVC by RN in lab. VS taken.      CVC accessed, labs drawn. Line flushed and Heparin locked. Vital signs taken. Checked into next appointment.   Renee Holbrook RN

## 2019-05-19 NOTE — NURSING NOTE
"Oncology Rooming Note    May 19, 2019 9:14 AM   Angel Yanez is a 58 year old male who presents for:    Chief Complaint   Patient presents with     Blood Draw     Labs drawn via CVC by RN in lab. VS taken.      RECHECK     MM post txp here for provider visit.      Initial Vitals: /80 (BP Location: Right arm, Patient Position: Sitting, Cuff Size: Adult Regular)   Pulse 110   Temp 98.5  F (36.9  C) (Oral)   Resp 16   Wt 82.1 kg (181 lb 1.6 oz)   SpO2 98%   BMI 26.67 kg/m   Estimated body mass index is 26.67 kg/m  as calculated from the following:    Height as of 5/15/19: 1.755 m (5' 9.09\").    Weight as of this encounter: 82.1 kg (181 lb 1.6 oz). Body surface area is 2 meters squared.  No Pain (0) Comment: Data Unavailable   No LMP for male patient.  Allergies reviewed: Yes  Medications reviewed: Yes    Medications: Medication refills not needed today.  Pharmacy name entered into EPIC:    [facility] - [place], [place] - [address] [facility] - [place], [place] - [address]    Clinical concerns: None      Tr Jj RN                "

## 2019-05-19 NOTE — PROGRESS NOTES
BMT Daily Progress Note   05/18/2019     Patient ID:  Angel Yanez is a 58 year old male, currently day +2 of his HCT.      Diagnosis MM Multiple myeloma  HCT Type Autologous    Prep Regimen Cytoxan  Melphalan   Donor Source No data was found    GVHD Prophylaxis No  Primary BMT Provider Naveed      Angel was admitted on 5/16/2019 for autologous bone marrow transplant for MM.     INTERVAL  HISTORY      He is overall feeling well. Received one unit of PRBC yesterday in the hospital.     Review of Systems: 10 point ROS negative except as noted above.     /80 (BP Location: Right arm, Patient Position: Sitting, Cuff Size: Adult Regular)   Pulse 110   Temp 98.5  F (36.9  C) (Oral)   Resp 16   Wt 82.1 kg (181 lb 1.6 oz)   SpO2 98%   BMI 26.67 kg/m       General: NAD   Eyes: : FARRAH, sclera anicteric   Nose/Mouth/Throat: OP clear, buccal mucosa moist, no ulcerations   Lungs: CTA bilaterally  Cardiovascular: RRR, no M/R/G   Abdominal/Rectal: +BS, soft, NT, ND, No HSM   Skin: no rashes or petechaie  Neuro: A&O   BM site - clean- no discharge     Labs- reviewed    OVERALL PLAN   Angel Yanez is a 58 year old male with IgG Kappa MM D+2 of 2nd auto BMT      1.  BMT/MM: Received Melphalan D-1. Allopurinol complete .  Completed flush with transplant.  5/17 day 0, transplant. Cell dose is 0.639x10^6.  Recent bone  Marrow [place] - known poor cell dose as failed chemo-mobilization 1/2019.         - GCSF starts d+5(5/22/2019) and cont to until ANC>2500 x2 consecutive days. Re-stage per protocol.     2.  HEME: Keep Hgb>8 and plts>10K. No pre-meds.  Not yet neutropenic.       3.  ID: Afebrile.   - ACV prophy (800mg 5x/day)  -Bacterial prophy: hx of hives with moxifloxacin so will use azithro 250mg daily  -fluc prophy     4.  GI: Zofran and dexamethasone prior to chemo to prevent N/V is complete. Ativan and compazine available PRN break-through symptoms. Sent home with compazine prn incase has nausea.  -  GI prophy-  omeprazole.      5.  FEN/Renal:   -Lasix today before discharge.  Weight up from flush from 80.8 to 82.6  - transplant flush complete  - regular diet  - Monitor creat and lytes. Replete lytes PRN per SS. Monitor weight, I+O   6. Pain: Tylenol, oxycodone prn pain.        Elia Jones MD

## 2019-05-20 ENCOUNTER — APPOINTMENT (OUTPATIENT)
Dept: LAB | Facility: CLINIC | Age: 59
End: 2019-05-20
Attending: NURSE PRACTITIONER
Payer: COMMERCIAL

## 2019-05-20 ENCOUNTER — HOME INFUSION (PRE-WILLOW HOME INFUSION) (OUTPATIENT)
Dept: PHARMACY | Facility: CLINIC | Age: 59
End: 2019-05-20

## 2019-05-20 ENCOUNTER — ONCOLOGY VISIT (OUTPATIENT)
Dept: TRANSPLANT | Facility: CLINIC | Age: 59
End: 2019-05-20
Attending: NURSE PRACTITIONER
Payer: COMMERCIAL

## 2019-05-20 VITALS
DIASTOLIC BLOOD PRESSURE: 90 MMHG | HEART RATE: 102 BPM | RESPIRATION RATE: 16 BRPM | SYSTOLIC BLOOD PRESSURE: 158 MMHG | TEMPERATURE: 97.6 F | OXYGEN SATURATION: 98 % | WEIGHT: 180.9 LBS | BODY MASS INDEX: 26.64 KG/M2

## 2019-05-20 DIAGNOSIS — C90.01 MULTIPLE MYELOMA IN REMISSION (H): ICD-10-CM

## 2019-05-20 LAB
ANION GAP SERPL CALCULATED.3IONS-SCNC: 7 MMOL/L (ref 3–14)
BASOPHILS # BLD AUTO: 0 10E9/L (ref 0–0.2)
BASOPHILS NFR BLD AUTO: 0 %
BUN SERPL-MCNC: 29 MG/DL (ref 7–30)
CALCIUM SERPL-MCNC: 8.7 MG/DL (ref 8.5–10.1)
CHLORIDE SERPL-SCNC: 108 MMOL/L (ref 94–109)
CO2 SERPL-SCNC: 25 MMOL/L (ref 20–32)
COPATH REPORT: NORMAL
COPATH REPORT: NORMAL
CREAT SERPL-MCNC: 0.88 MG/DL (ref 0.66–1.25)
DIFFERENTIAL METHOD BLD: ABNORMAL
EOSINOPHIL # BLD AUTO: 0 10E9/L (ref 0–0.7)
EOSINOPHIL NFR BLD AUTO: 0 %
ERYTHROCYTE [DISTWIDTH] IN BLOOD BY AUTOMATED COUNT: 14.3 % (ref 10–15)
GFR SERPL CREATININE-BSD FRML MDRD: >90 ML/MIN/{1.73_M2}
GLUCOSE SERPL-MCNC: 128 MG/DL (ref 70–99)
HCT VFR BLD AUTO: 30.1 % (ref 40–53)
HGB BLD-MCNC: 10.2 G/DL (ref 13.3–17.7)
IMM GRANULOCYTES # BLD: 0 10E9/L (ref 0–0.4)
IMM GRANULOCYTES NFR BLD: 0.6 %
LYMPHOCYTES # BLD AUTO: 0.1 10E9/L (ref 0.8–5.3)
LYMPHOCYTES NFR BLD AUTO: 2.5 %
MCH RBC QN AUTO: 33.4 PG (ref 26.5–33)
MCHC RBC AUTO-ENTMCNC: 33.9 G/DL (ref 31.5–36.5)
MCV RBC AUTO: 99 FL (ref 78–100)
MONOCYTES # BLD AUTO: 0 10E9/L (ref 0–1.3)
MONOCYTES NFR BLD AUTO: 0.3 %
NEUTROPHILS # BLD AUTO: 3.2 10E9/L (ref 1.6–8.3)
NEUTROPHILS NFR BLD AUTO: 96.6 %
NRBC # BLD AUTO: 0 10*3/UL
NRBC BLD AUTO-RTO: 0 /100
PLATELET # BLD AUTO: 85 10E9/L (ref 150–450)
POTASSIUM SERPL-SCNC: 3.6 MMOL/L (ref 3.4–5.3)
RBC # BLD AUTO: 3.05 10E12/L (ref 4.4–5.9)
SODIUM SERPL-SCNC: 140 MMOL/L (ref 133–144)
WBC # BLD AUTO: 3.3 10E9/L (ref 4–11)

## 2019-05-20 PROCEDURE — 36592 COLLECT BLOOD FROM PICC: CPT

## 2019-05-20 PROCEDURE — 25000128 H RX IP 250 OP 636: Mod: ZF | Performed by: NURSE PRACTITIONER

## 2019-05-20 PROCEDURE — 85025 COMPLETE CBC W/AUTO DIFF WBC: CPT | Performed by: PHYSICIAN ASSISTANT

## 2019-05-20 PROCEDURE — G0463 HOSPITAL OUTPT CLINIC VISIT: HCPCS | Mod: ZF

## 2019-05-20 PROCEDURE — 80048 BASIC METABOLIC PNL TOTAL CA: CPT | Performed by: PHYSICIAN ASSISTANT

## 2019-05-20 RX ORDER — HEPARIN SODIUM,PORCINE 10 UNIT/ML
5 VIAL (ML) INTRAVENOUS
Status: DISCONTINUED | OUTPATIENT
Start: 2019-05-20 | End: 2019-05-20 | Stop reason: HOSPADM

## 2019-05-20 RX ADMIN — HEPARIN, PORCINE (PF) 10 UNIT/ML INTRAVENOUS SYRINGE 5 ML: at 10:40

## 2019-05-20 ASSESSMENT — PAIN SCALES - GENERAL: PAINLEVEL: NO PAIN (0)

## 2019-05-20 NOTE — NURSING NOTE
Chief Complaint   Patient presents with     Blood Draw     labs drawn from cvc by rn.  vital signs taken.     CVC accessed by RN in lab, labs drawn.  Both lines flushed with heparin.    Vital signs taken.  Pt checked in to next appointment.  Caitlin Webster RN

## 2019-05-20 NOTE — NURSING NOTE
"Oncology Rooming Note    May 20, 2019 11:20 AM   Angel Yanez is a 58 year old male who presents for:    Chief Complaint   Patient presents with     Blood Draw     labs drawn from cvc by rn.  vital signs taken.     RECHECK     RTN- multiple myeloma     Initial Vitals: /90 (BP Location: Right arm, Patient Position: Sitting, Cuff Size: Adult Regular)   Pulse 102   Temp 97.6  F (36.4  C) (Oral)   Resp 16   Wt 82.1 kg (180 lb 14.4 oz)   SpO2 98%   BMI 26.64 kg/m   Estimated body mass index is 26.64 kg/m  as calculated from the following:    Height as of 5/15/19: 1.755 m (5' 9.09\").    Weight as of this encounter: 82.1 kg (180 lb 14.4 oz). Body surface area is 2 meters squared.  No Pain (0) Comment: Data Unavailable   No LMP for male patient.  Allergies reviewed: Yes  Medications reviewed: Yes    Medications: Medication refills not needed today.  Pharmacy name entered into EPIC:    [facility] [place] [facility] [place] [facility] [address]  [facility] - [place], [place] - [address] [facility]    Clinical concerns: none    Vickie Harris CMA              "

## 2019-05-20 NOTE — PLAN OF CARE
Occupational Therapy Discharge Summary    Reason for therapy discharge:    Discharged to home.    Progress towards therapy goal(s). See goals on Care Plan in [facility] electronic health record for goal details.  Goals partially met.  Barriers to achieving goals:   discharge from facility.    Therapy recommendation(s):    No further therapy is recommended.

## 2019-05-20 NOTE — PROGRESS NOTES
"BMT Daily Progress Note   05/18/2019     Patient ID:  Angel Yanez is a 58 year old male, currently day + 3 of his HCT.      Diagnosis MM Multiple myeloma  HCT Type Autologous    Prep Regimen Cytoxan  Melphalan   Donor Source No data was found    GVHD Prophylaxis No  Primary BMT Provider Naveed      Angel was admitted on 5/16/2019 for autologous bone marrow transplant for MM.     INTERVAL  HISTORY      Guille was seen today for a follow up visit. States he has been feeling \"surprisingly well\" for day + 3. No new constitutional symptoms. Eating and drinking adequate amounts. Denied any bleeding. Discussed plan for follow up and he will schedule daily appts through Friday.      Review of Systems: 10 point ROS negative except as noted above.     There were no vitals taken for this visit.     General: NAD   Eyes: : FARRAH, sclera anicteric   Nose/Mouth/Throat: OP clear, buccal mucosa moist, no ulcerations   Lungs: CTA bilaterally  Cardiovascular: RRR, no M/R/G   Abdominal/Rectal: +BS, soft, NT, ND, No HSM   Skin: no rashes or petechaie  Neuro: A&O   BM site - clean- no discharge     Labs- reviewed    OVERALL PLAN   Angel Yanez is a 58 year old male with IgG Kappa MM D+2 of 2nd auto BMT      1.  BMT/MM: Received Melphalan D-1. Allopurinol complete .  Completed flush with transplant.  5/17 day 0, transplant. Cell dose was only 0.639x10^6.  Recent bone  Marrow [place] - known poor cell dose as failed chemo-mobilization 1/2019.   - GCSF starts d+5(5/22/2019) and cont to until ANC>2500 x2 consecutive days. Re-stage per protocol.     2.  HEME: Keep Hgb>8 and plts>10K. Counts downtrending.   - No pre-meds.  - Not yet neutropenic.    3.  ID: Afebrile.   - ACV prophy (800mg 5x/day)  -Bacterial prophy: hx of hives with moxifloxacin so will use azithro 250mg daily  -fluc prophy     4.  GI: No complaints 5/20  - Ativan and compazine available PRN break-through symptoms. Sent home with compazine prn incase has nausea.  -  GI " prophy- omeprazole.      5.  FEN/Renal:   - transplant flush complete  - regular diet  - Monitor creat and lytes. Replete lytes PRN per SS. Monitor weight, I+O     6. Pain: Tylenol, oxycodone prn pain. No complaints 5/20.    Plan:  - RTC daily for provider visit, labs, and possible infusion of fluids  - GCSF starts day + 5 per protocol  - Plan for dressing change tomorrow          Yvonne Diallo, MSN, APRN, ACNP-BC  Pager: [phone]

## 2019-05-20 NOTE — PATIENT INSTRUCTIONS
- RTC daily for provider visit, labs, possible IV fluids/electrolytes through Friday 5/24    Yvonne Diallo, MSN, APRN, ACNP-BC  Pager: [phone]

## 2019-05-21 ENCOUNTER — ONCOLOGY VISIT (OUTPATIENT)
Dept: TRANSPLANT | Facility: CLINIC | Age: 59
End: 2019-05-21
Attending: PHYSICIAN ASSISTANT
Payer: COMMERCIAL

## 2019-05-21 ENCOUNTER — APPOINTMENT (OUTPATIENT)
Dept: LAB | Facility: CLINIC | Age: 59
End: 2019-05-21
Attending: PHYSICIAN ASSISTANT
Payer: COMMERCIAL

## 2019-05-21 VITALS
DIASTOLIC BLOOD PRESSURE: 74 MMHG | OXYGEN SATURATION: 98 % | RESPIRATION RATE: 16 BRPM | HEART RATE: 71 BPM | HEIGHT: 69 IN | WEIGHT: 180.4 LBS | BODY MASS INDEX: 26.72 KG/M2 | SYSTOLIC BLOOD PRESSURE: 126 MMHG | TEMPERATURE: 98.1 F

## 2019-05-21 DIAGNOSIS — C90.01 MULTIPLE MYELOMA IN REMISSION (H): ICD-10-CM

## 2019-05-21 DIAGNOSIS — Z94.81 S/P BONE MARROW TRANSPLANT (H): Primary | ICD-10-CM

## 2019-05-21 LAB
ANION GAP SERPL CALCULATED.3IONS-SCNC: 7 MMOL/L (ref 3–14)
BASOPHILS # BLD AUTO: 0 10E9/L (ref 0–0.2)
BASOPHILS NFR BLD AUTO: 0 %
BUN SERPL-MCNC: 20 MG/DL (ref 7–30)
CALCIUM SERPL-MCNC: 8.5 MG/DL (ref 8.5–10.1)
CHLORIDE SERPL-SCNC: 108 MMOL/L (ref 94–109)
CO2 SERPL-SCNC: 25 MMOL/L (ref 20–32)
CREAT SERPL-MCNC: 0.86 MG/DL (ref 0.66–1.25)
DIFFERENTIAL METHOD BLD: ABNORMAL
EOSINOPHIL # BLD AUTO: 0 10E9/L (ref 0–0.7)
EOSINOPHIL NFR BLD AUTO: 0.4 %
ERYTHROCYTE [DISTWIDTH] IN BLOOD BY AUTOMATED COUNT: 13.5 % (ref 10–15)
GFR SERPL CREATININE-BSD FRML MDRD: >90 ML/MIN/{1.73_M2}
GLUCOSE SERPL-MCNC: 101 MG/DL (ref 70–99)
HCT VFR BLD AUTO: 28.5 % (ref 40–53)
HGB BLD-MCNC: 9.9 G/DL (ref 13.3–17.7)
IMM GRANULOCYTES # BLD: 0 10E9/L (ref 0–0.4)
IMM GRANULOCYTES NFR BLD: 0.4 %
LYMPHOCYTES # BLD AUTO: 0.1 10E9/L (ref 0.8–5.3)
LYMPHOCYTES NFR BLD AUTO: 2.2 %
MAGNESIUM SERPL-MCNC: 2.5 MG/DL (ref 1.6–2.3)
MCH RBC QN AUTO: 34.1 PG (ref 26.5–33)
MCHC RBC AUTO-ENTMCNC: 34.7 G/DL (ref 31.5–36.5)
MCV RBC AUTO: 98 FL (ref 78–100)
MONOCYTES # BLD AUTO: 0 10E9/L (ref 0–1.3)
MONOCYTES NFR BLD AUTO: 0.4 %
NEUTROPHILS # BLD AUTO: 2.7 10E9/L (ref 1.6–8.3)
NEUTROPHILS NFR BLD AUTO: 96.6 %
NRBC # BLD AUTO: 0 10*3/UL
NRBC BLD AUTO-RTO: 0 /100
PLATELET # BLD AUTO: 73 10E9/L (ref 150–450)
POTASSIUM SERPL-SCNC: 3.9 MMOL/L (ref 3.4–5.3)
RBC # BLD AUTO: 2.9 10E12/L (ref 4.4–5.9)
SODIUM SERPL-SCNC: 140 MMOL/L (ref 133–144)
WBC # BLD AUTO: 2.8 10E9/L (ref 4–11)

## 2019-05-21 PROCEDURE — 80048 BASIC METABOLIC PNL TOTAL CA: CPT | Performed by: NURSE PRACTITIONER

## 2019-05-21 PROCEDURE — 83735 ASSAY OF MAGNESIUM: CPT | Performed by: NURSE PRACTITIONER

## 2019-05-21 PROCEDURE — 25000128 H RX IP 250 OP 636: Mod: ZF | Performed by: PHYSICIAN ASSISTANT

## 2019-05-21 PROCEDURE — 85025 COMPLETE CBC W/AUTO DIFF WBC: CPT | Performed by: NURSE PRACTITIONER

## 2019-05-21 PROCEDURE — 36592 COLLECT BLOOD FROM PICC: CPT

## 2019-05-21 PROCEDURE — G0463 HOSPITAL OUTPT CLINIC VISIT: HCPCS | Mod: ZF

## 2019-05-21 RX ORDER — HEPARIN SODIUM,PORCINE 10 UNIT/ML
5 VIAL (ML) INTRAVENOUS
Status: DISCONTINUED | OUTPATIENT
Start: 2019-05-21 | End: 2019-05-21 | Stop reason: HOSPADM

## 2019-05-21 RX ADMIN — HEPARIN, PORCINE (PF) 10 UNIT/ML INTRAVENOUS SYRINGE 5 ML: at 11:08

## 2019-05-21 ASSESSMENT — MIFFLIN-ST. JEOR: SCORE: 1620.09

## 2019-05-21 ASSESSMENT — PAIN SCALES - GENERAL: PAINLEVEL: NO PAIN (0)

## 2019-05-21 NOTE — NURSING NOTE
Dressing change completed sterile technique used. Site WDL. Patient tolerated dressing change. See Flowsheet.     GENE Elizabeth

## 2019-05-21 NOTE — PROGRESS NOTES
BMT Clinic Note        Patient ID:  Angel Yanez is a 58 year old male, day +5 s/p 2nd auto PBSCT for IgG Kappa MM      Diagnosis MM Multiple myeloma  HCT Type Autologous    Prep Regimen Cytoxan  Melphalan   Donor Source No data was found    GVHD Prophylaxis No  Primary BMT Provider Naveed      Angel was admitted on 5/16/2019 for autologous bone marrow transplant for MM.     INTERVAL  HISTORY     Guille is feeling well. He is feeling a little more tired.  Still having 3-4 stools a day, but they are just soft, not watery.  Intermittant nausea, no emesis.  Afebrile with no cough, congestion or dyspnea.  No pain, bleeding or rash.     Review of Systems: 10 point ROS negative except as noted above.     /83 (BP Location: Right arm, Patient Position: Sitting, Cuff Size: Adult Regular)   Pulse 110   Temp 98.3  F (36.8  C) (Oral)   Resp 16   Wt 81.1 kg (178 lb 14.4 oz)   SpO2 96%   BMI 26.35 kg/m       General: NAD   Eyes: : FARRAH, sclera anicteric   Nose/Mouth/Throat: OP clear, buccal mucosa moist, no ulcerations   Lungs: CTA bilaterally  Cardiovascular: RRR, no M/R/G   Abdominal/Rectal: +BS, soft, NT, ND, No HSM   Skin: no rashes or petechaie  Neuro: A&O   Access: Hsieh right side chest wall, dressing intact     Labs  Lab Results   Component Value Date    WBC 2.8 (L) 05/21/2019    ANEU 2.7 05/21/2019    HGB 9.9 (L) 05/21/2019    HCT 28.5 (L) 05/21/2019    PLT 73 (L) 05/21/2019     05/21/2019    POTASSIUM 3.9 05/21/2019    CHLORIDE 108 05/21/2019    CO2 25 05/21/2019     (H) 05/21/2019    BUN 20 05/21/2019    CR 0.86 05/21/2019    MAG 2.5 (H) 05/21/2019    INR 1.14 05/16/2019    BILITOTAL 0.2 05/17/2019    AST 5 05/17/2019    ALT 18 05/17/2019    ALKPHOS 49 05/17/2019    PROTTOTAL 6.0 (L) 05/17/2019    ALBUMIN 3.2 (L) 05/17/2019       OVERALL PLAN   Angel Yanez is a 58 year old male, day +5 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  BMT/MM: Received Melphalan D-1. Allopurinol complete .    - 5/17 day 0, transplant. Cell dose was only 0.639x10^6. (Marrow [place] - known poor cell dose as failed chemo-mobilization 1/2019.)   - GCSF starts today and cont to until ANC>2500 x2 consecutive days.  - Taking Claritin  -  Re-stage per protocol.     2.  HEME: Keep Hgb>8 and plts>10K. No transfusions today  - No pre-meds.    3.  ID: Afebrile.   - proph with HD AC, Fluconazole &  Azithro (hives with Moxifloxicin  -PVP proph day +28     4.  GI:   - Ativan and compazine PRN N/V.  Not needing yet  -  GI prophy- omeprazole.      5.  FEN/Renal: Lytes & creat stable.   - transplant flush complete  - regular diet  - Monitor creat and lytes. Replete lytes PRN per SS. Monitor weight, I+O     6.  Mood:  Cont Paxil    RTC daily    Karla Manzo

## 2019-05-21 NOTE — NURSING NOTE
"Oncology Rooming Note    May 21, 2019 11:24 AM   Angel Yanez is a 58 year old male who presents for:    Chief Complaint   Patient presents with     Blood Draw     Labs drawn via CVC by RN in lab. VS taken.      RECHECK     Return: Multiple myeloma      Initial Vitals: /74 (BP Location: Right arm, Patient Position: Sitting, Cuff Size: Adult Regular)   Pulse 71   Temp 98.1  F (36.7  C) (Oral)   Resp 16   Ht 1.755 m (5' 9.09\")   Wt 81.8 kg (180 lb 6.4 oz)   SpO2 98%   BMI 26.57 kg/m   Estimated body mass index is 26.57 kg/m  as calculated from the following:    Height as of this encounter: 1.755 m (5' 9.09\").    Weight as of this encounter: 81.8 kg (180 lb 6.4 oz). Body surface area is 2 meters squared.  No Pain (0) Comment: Data Unavailable   No LMP for male patient.  Allergies reviewed: Yes  Medications reviewed: Yes    Medications: Medication refills not needed today.  Pharmacy name entered into EPIC:    [facility] [place] [facility] [address]  [facility] - [place], [place] - [address]    Clinical concerns: N/A     Yesica Duncan CMA              "

## 2019-05-21 NOTE — PROGRESS NOTES
This is a recent snapshot of the patient's [facility] medical record.  For current drug dose and complete information and questions, call [phone] or In Basket pool, [facility] home infusion ([id number])  CSN Number:  [id number]

## 2019-05-21 NOTE — PROGRESS NOTES
"BMT Daily Progress Note   05/21/2019     Patient ID:  Angel aYnez is a 58 year old male, currently day + 4 of his HCT.      Diagnosis MM Multiple myeloma  HCT Type Autologous    Prep Regimen Cytoxan  Melphalan   Donor Source No data was found    GVHD Prophylaxis No  Primary BMT Provider Kevinsergo Ortiz was admitted on 5/16/2019 for autologous bone marrow transplant for MM.     INTERVAL  HISTORY     Guille is feeling well. Notes some fatigue. No fevers or infectious concerns. No n/v/d/c. No bleeding.     Review of Systems: 10 point ROS negative except as noted above.     /74 (BP Location: Right arm, Patient Position: Sitting, Cuff Size: Adult Regular)   Pulse 71   Temp 98.1  F (36.7  C) (Oral)   Resp 16   Ht 1.755 m (5' 9.09\")   Wt 81.8 kg (180 lb 6.4 oz)   SpO2 98%   BMI 26.57 kg/m       General: NAD   Eyes: : FARRAH, sclera anicteric   Nose/Mouth/Throat: OP clear, buccal mucosa moist, no ulcerations   Lungs: CTA bilaterally  Cardiovascular: RRR, no M/R/G   Abdominal/Rectal: +BS, soft, NT, ND, No HSM   Skin: no rashes or petechaie  Neuro: A&O   Access: Hsieh right side chest wall, dressing intact     Labs  Lab Results   Component Value Date    WBC 2.8 (L) 05/21/2019    ANEU 2.7 05/21/2019    HGB 9.9 (L) 05/21/2019    HCT 28.5 (L) 05/21/2019    PLT 73 (L) 05/21/2019     05/21/2019    POTASSIUM 3.9 05/21/2019    CHLORIDE 108 05/21/2019    CO2 25 05/21/2019     (H) 05/21/2019    BUN 20 05/21/2019    CR 0.86 05/21/2019    MAG 2.5 (H) 05/21/2019    INR 1.14 05/16/2019    BILITOTAL 0.2 05/17/2019    AST 5 05/17/2019    ALT 18 05/17/2019    ALKPHOS 49 05/17/2019    PROTTOTAL 6.0 (L) 05/17/2019    ALBUMIN 3.2 (L) 05/17/2019       OVERALL PLAN   Angel Yanez is a 58 year old male with IgG Kappa MM D+4 of 2nd auto BMT      1.  BMT/MM: Received Melphalan D-1. Allopurinol complete .  Completed flush with transplant.  5/17 day 0, transplant. Cell dose was only 0.639x10^6.  Recent bone  Marrow " [place] - known poor cell dose as failed chemo-mobilization 1/2019.   - GCSF starts d+5(5/22/2019) and cont to until ANC>2500 x2 consecutive days. Re-stage per protocol.     2.  HEME: Keep Hgb>8 and plts>10K. Counts downtrending, not currently requiring transfusions  - No pre-meds.  - Not yet neutropenic.    3.  ID: Afebrile.   - ACV prophy (800mg 5x/day)  -Bacterial prophy: hx of hives with moxifloxacin so will use azithro 250mg daily  -fluc prophy     4.  GI:   - Ativan and compazine available PRN break-through symptoms. Sent home with compazine prn incase has nausea.  -  GI prophy- omeprazole.      5.  FEN/Renal:   - transplant flush complete  - regular diet  - Monitor creat and lytes. Replete lytes PRN per SS. Monitor weight, I+O     6. Pain: Tylenol, oxycodone prn pain. No complaints 5/20.    Plan:  - RTC daily for provider visit, labs, and possible infusion of fluids  - GCSF starts day + 5 per protocol      Alejandra Adorno PA-C  [phone]

## 2019-05-22 ENCOUNTER — ONCOLOGY VISIT (OUTPATIENT)
Dept: TRANSPLANT | Facility: CLINIC | Age: 59
End: 2019-05-22
Attending: NURSE PRACTITIONER
Payer: COMMERCIAL

## 2019-05-22 ENCOUNTER — APPOINTMENT (OUTPATIENT)
Dept: LAB | Facility: CLINIC | Age: 59
End: 2019-05-22
Attending: NURSE PRACTITIONER
Payer: COMMERCIAL

## 2019-05-22 VITALS
SYSTOLIC BLOOD PRESSURE: 119 MMHG | HEART RATE: 110 BPM | DIASTOLIC BLOOD PRESSURE: 83 MMHG | OXYGEN SATURATION: 96 % | RESPIRATION RATE: 16 BRPM | TEMPERATURE: 98.3 F | WEIGHT: 178.9 LBS | BODY MASS INDEX: 26.35 KG/M2

## 2019-05-22 DIAGNOSIS — Z94.81 S/P BONE MARROW TRANSPLANT (H): ICD-10-CM

## 2019-05-22 DIAGNOSIS — C90.01 MULTIPLE MYELOMA IN REMISSION (H): Primary | ICD-10-CM

## 2019-05-22 LAB
ANION GAP SERPL CALCULATED.3IONS-SCNC: 9 MMOL/L (ref 3–14)
BASOPHILS # BLD AUTO: 0 10E9/L (ref 0–0.2)
BASOPHILS NFR BLD AUTO: 0 %
BUN SERPL-MCNC: 16 MG/DL (ref 7–30)
CALCIUM SERPL-MCNC: 8.8 MG/DL (ref 8.5–10.1)
CHLORIDE SERPL-SCNC: 105 MMOL/L (ref 94–109)
CO2 SERPL-SCNC: 24 MMOL/L (ref 20–32)
CREAT SERPL-MCNC: 0.84 MG/DL (ref 0.66–1.25)
DIFFERENTIAL METHOD BLD: ABNORMAL
EOSINOPHIL # BLD AUTO: 0 10E9/L (ref 0–0.7)
EOSINOPHIL NFR BLD AUTO: 0 %
ERYTHROCYTE [DISTWIDTH] IN BLOOD BY AUTOMATED COUNT: 13 % (ref 10–15)
GFR SERPL CREATININE-BSD FRML MDRD: >90 ML/MIN/{1.73_M2}
GLUCOSE SERPL-MCNC: 137 MG/DL (ref 70–99)
HCT VFR BLD AUTO: 29.2 % (ref 40–53)
HGB BLD-MCNC: 10 G/DL (ref 13.3–17.7)
IMM GRANULOCYTES # BLD: 0 10E9/L (ref 0–0.4)
IMM GRANULOCYTES NFR BLD: 1.3 %
LOCATION PERFORMED: NORMAL
LYMPHOCYTES # BLD AUTO: 0.1 10E9/L (ref 0.8–5.3)
LYMPHOCYTES NFR BLD AUTO: 3.2 %
MCH RBC QN AUTO: 33.7 PG (ref 26.5–33)
MCHC RBC AUTO-ENTMCNC: 34.2 G/DL (ref 31.5–36.5)
MCV RBC AUTO: 98 FL (ref 78–100)
MONOCYTES # BLD AUTO: 0 10E9/L (ref 0–1.3)
MONOCYTES NFR BLD AUTO: 0 %
NEUTROPHILS # BLD AUTO: 1.5 10E9/L (ref 1.6–8.3)
NEUTROPHILS NFR BLD AUTO: 95.5 %
NRBC # BLD AUTO: 0 10*3/UL
NRBC BLD AUTO-RTO: 0 /100
PLATELET # BLD AUTO: 56 10E9/L (ref 150–450)
POTASSIUM SERPL-SCNC: 3.9 MMOL/L (ref 3.4–5.3)
RBC # BLD AUTO: 2.97 10E12/L (ref 4.4–5.9)
SEND OUTS MISC TEST CODE: NORMAL
SEND OUTS MISC TEST SPECIMEN: NORMAL
SODIUM SERPL-SCNC: 138 MMOL/L (ref 133–144)
TEST NAME: NORMAL
WBC # BLD AUTO: 1.5 10E9/L (ref 4–11)

## 2019-05-22 PROCEDURE — 25000128 H RX IP 250 OP 636: Mod: ZF | Performed by: PHYSICIAN ASSISTANT

## 2019-05-22 PROCEDURE — 85025 COMPLETE CBC W/AUTO DIFF WBC: CPT | Performed by: PHYSICIAN ASSISTANT

## 2019-05-22 PROCEDURE — G0463 HOSPITAL OUTPT CLINIC VISIT: HCPCS | Mod: ZF

## 2019-05-22 PROCEDURE — 80048 BASIC METABOLIC PNL TOTAL CA: CPT | Performed by: PHYSICIAN ASSISTANT

## 2019-05-22 PROCEDURE — 96372 THER/PROPH/DIAG INJ SC/IM: CPT

## 2019-05-22 PROCEDURE — 25000128 H RX IP 250 OP 636: Mod: ZF | Performed by: NURSE PRACTITIONER

## 2019-05-22 RX ORDER — HEPARIN SODIUM,PORCINE 10 UNIT/ML
5 VIAL (ML) INTRAVENOUS
Status: CANCELLED | OUTPATIENT
Start: 2019-05-23

## 2019-05-22 RX ORDER — HEPARIN SODIUM,PORCINE 10 UNIT/ML
5 VIAL (ML) INTRAVENOUS
Status: DISCONTINUED | OUTPATIENT
Start: 2019-05-22 | End: 2019-05-22 | Stop reason: HOSPADM

## 2019-05-22 RX ADMIN — HEPARIN, PORCINE (PF) 10 UNIT/ML INTRAVENOUS SYRINGE 5 ML: at 11:53

## 2019-05-22 RX ADMIN — FILGRASTIM 480 MCG: 480 INJECTION, SOLUTION INTRAVENOUS; SUBCUTANEOUS at 12:59

## 2019-05-22 ASSESSMENT — PAIN SCALES - GENERAL: PAINLEVEL: NO PAIN (0)

## 2019-05-23 ENCOUNTER — ONCOLOGY VISIT (OUTPATIENT)
Dept: TRANSPLANT | Facility: CLINIC | Age: 59
End: 2019-05-23
Attending: NURSE PRACTITIONER
Payer: COMMERCIAL

## 2019-05-23 ENCOUNTER — APPOINTMENT (OUTPATIENT)
Dept: LAB | Facility: CLINIC | Age: 59
End: 2019-05-23
Attending: NURSE PRACTITIONER
Payer: COMMERCIAL

## 2019-05-23 VITALS
DIASTOLIC BLOOD PRESSURE: 72 MMHG | WEIGHT: 177.9 LBS | OXYGEN SATURATION: 96 % | TEMPERATURE: 97.4 F | SYSTOLIC BLOOD PRESSURE: 130 MMHG | HEART RATE: 131 BPM | BODY MASS INDEX: 26.2 KG/M2 | RESPIRATION RATE: 16 BRPM

## 2019-05-23 DIAGNOSIS — C90.01 MULTIPLE MYELOMA IN REMISSION (H): ICD-10-CM

## 2019-05-23 DIAGNOSIS — R19.7 DIARRHEA, UNSPECIFIED TYPE: Primary | ICD-10-CM

## 2019-05-23 LAB
ANION GAP SERPL CALCULATED.3IONS-SCNC: 8 MMOL/L (ref 3–14)
BASOPHILS # BLD AUTO: 0 10E9/L (ref 0–0.2)
BASOPHILS NFR BLD AUTO: 0 %
BUN SERPL-MCNC: 16 MG/DL (ref 7–30)
CALCIUM SERPL-MCNC: 8.6 MG/DL (ref 8.5–10.1)
CHLORIDE SERPL-SCNC: 108 MMOL/L (ref 94–109)
CO2 SERPL-SCNC: 24 MMOL/L (ref 20–32)
CREAT SERPL-MCNC: 0.94 MG/DL (ref 0.66–1.25)
DIFFERENTIAL METHOD BLD: ABNORMAL
EOSINOPHIL # BLD AUTO: 0 10E9/L (ref 0–0.7)
EOSINOPHIL NFR BLD AUTO: 0 %
ERYTHROCYTE [DISTWIDTH] IN BLOOD BY AUTOMATED COUNT: 12.9 % (ref 10–15)
GFR SERPL CREATININE-BSD FRML MDRD: 87 ML/MIN/{1.73_M2}
GLUCOSE SERPL-MCNC: 108 MG/DL (ref 70–99)
HCT VFR BLD AUTO: 29.3 % (ref 40–53)
HGB BLD-MCNC: 9.8 G/DL (ref 13.3–17.7)
IMM GRANULOCYTES # BLD: 0 10E9/L (ref 0–0.4)
IMM GRANULOCYTES NFR BLD: 1.5 %
LYMPHOCYTES # BLD AUTO: 0 10E9/L (ref 0.8–5.3)
LYMPHOCYTES NFR BLD AUTO: 2.3 %
MAGNESIUM SERPL-MCNC: 2.2 MG/DL (ref 1.6–2.3)
MCH RBC QN AUTO: 33.3 PG (ref 26.5–33)
MCHC RBC AUTO-ENTMCNC: 33.4 G/DL (ref 31.5–36.5)
MCV RBC AUTO: 100 FL (ref 78–100)
MONOCYTES # BLD AUTO: 0 10E9/L (ref 0–1.3)
MONOCYTES NFR BLD AUTO: 0 %
NEUTROPHILS # BLD AUTO: 1.3 10E9/L (ref 1.6–8.3)
NEUTROPHILS NFR BLD AUTO: 96.2 %
NRBC # BLD AUTO: 0 10*3/UL
NRBC BLD AUTO-RTO: 0 /100
PLATELET # BLD AUTO: 38 10E9/L (ref 150–450)
POTASSIUM SERPL-SCNC: 4.1 MMOL/L (ref 3.4–5.3)
RBC # BLD AUTO: 2.94 10E12/L (ref 4.4–5.9)
SODIUM SERPL-SCNC: 140 MMOL/L (ref 133–144)
WBC # BLD AUTO: 1.3 10E9/L (ref 4–11)

## 2019-05-23 PROCEDURE — 25000128 H RX IP 250 OP 636: Mod: ZF | Performed by: NURSE PRACTITIONER

## 2019-05-23 PROCEDURE — 96372 THER/PROPH/DIAG INJ SC/IM: CPT

## 2019-05-23 PROCEDURE — 25000128 H RX IP 250 OP 636: Mod: ZF | Performed by: PHYSICIAN ASSISTANT

## 2019-05-23 PROCEDURE — 80048 BASIC METABOLIC PNL TOTAL CA: CPT | Performed by: NURSE PRACTITIONER

## 2019-05-23 PROCEDURE — 83735 ASSAY OF MAGNESIUM: CPT | Performed by: NURSE PRACTITIONER

## 2019-05-23 PROCEDURE — 85025 COMPLETE CBC W/AUTO DIFF WBC: CPT | Performed by: NURSE PRACTITIONER

## 2019-05-23 RX ORDER — HEPARIN SODIUM,PORCINE 10 UNIT/ML
5 VIAL (ML) INTRAVENOUS
Status: DISCONTINUED | OUTPATIENT
Start: 2019-05-23 | End: 2019-05-23 | Stop reason: HOSPADM

## 2019-05-23 RX ORDER — HEPARIN SODIUM,PORCINE 10 UNIT/ML
5 VIAL (ML) INTRAVENOUS
Status: CANCELLED | OUTPATIENT
Start: 2019-05-24

## 2019-05-23 RX ADMIN — FILGRASTIM 480 MCG: 480 INJECTION, SOLUTION INTRAVENOUS; SUBCUTANEOUS at 14:18

## 2019-05-23 RX ADMIN — HEPARIN, PORCINE (PF) 10 UNIT/ML INTRAVENOUS SYRINGE 5 ML: at 13:19

## 2019-05-23 RX ADMIN — HEPARIN, PORCINE (PF) 10 UNIT/ML INTRAVENOUS SYRINGE 5 ML: at 13:18

## 2019-05-23 ASSESSMENT — PAIN SCALES - GENERAL: PAINLEVEL: NO PAIN (0)

## 2019-05-23 NOTE — PROGRESS NOTES
BMT Clinic Note  May 23, 2019          Patient ID:  Angel Yanez is a 58 year old male, day + 6 s/p 2nd auto PBSCT for IgG Kappa MM      Diagnosis MM Multiple myeloma  HCT Type Autologous    Prep Regimen Cytoxan  Melphalan   Donor Source No data was found    GVHD Prophylaxis No  Primary BMT Provider Naveed Ortiz was admitted on 5/16/2019 for autologous bone marrow transplant for MM.     INTERVAL  HISTORY     Guille was seen today for a follow up visit. Reports feeling well other than diarrhea which just started about an hour ago. No NV. Eating and drinking adequate amounts. States he will drink recommended 2 L of fluid this afternoon to prevent dehydration with the diarrhea. No new constitutional symptoms. No bleeding or bruising.     Review of Systems: 10 point ROS negative except as noted above.     There were no vitals taken for this visit.     General: NAD, interactive, appropriate    Eyes: : FARRAH, sclera anicteric   Nose/Mouth/Throat: OP clear, buccal mucosa moist, no ulcerations   Lungs: CTA bilaterally  Cardiovascular: RRR, no M/R/G   Abdominal/Rectal: +BS, soft, NT, ND, No HSM   Skin: no rashes or petechaie  Neuro: A&O   Access: Hsieh right side chest wall, dressing intact     Labs  Lab Results   Component Value Date    WBC 1.5 (L) 05/22/2019    ANEU 1.5 (L) 05/22/2019    HGB 10.0 (L) 05/22/2019    HCT 29.2 (L) 05/22/2019    PLT 56 (L) 05/22/2019     05/22/2019    POTASSIUM 3.9 05/22/2019    CHLORIDE 105 05/22/2019    CO2 24 05/22/2019     (H) 05/22/2019    BUN 16 05/22/2019    CR 0.84 05/22/2019    MAG 2.5 (H) 05/21/2019    INR 1.14 05/16/2019    BILITOTAL 0.2 05/17/2019    AST 5 05/17/2019    ALT 18 05/17/2019    ALKPHOS 49 05/17/2019    PROTTOTAL 6.0 (L) 05/17/2019    ALBUMIN 3.2 (L) 05/17/2019       OVERALL PLAN   Angel Yanez is a 58 year old male, day + 6 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  BMT/MM: Received Melphalan D-1. Allopurinol complete .   - 5/17 day 0,  transplant. Cell dose was only 0.639x10^6. (Marrow [place] - known poor cell dose as failed chemo-mobilization 1/2019.)   - Continue GCSF  until ANC>2500 x2 consecutive days.  - Taking Claritin  -  Re-stage per protocol      2.  HEME: Keep Hgb>8 and plts>10K. No transfusions today  - Discussed neutropenic precautions 5/23  - No pre-meds.    3.  ID: Afebrile. New onset diarrhea likely d/t chemotherapy but will r/o C.Diff today  - Diarrhea: Stool kit provided for C.Diff, will likely bring sample tomorrow   - proph with HD AC, Fluconazole &  Azithro (hives with Moxifloxicin  -PVP proph day +28     4.  GI: No NV.   - Diarrhea as above, if C.Diff neg start imodium   - Ativan and compazine PRN N/V.  Not needing yet  - GI prophy- omeprazole.      5.  FEN/Renal: Lytes & creat stable.   - Cr up slightly to 0.9, mild tachycardia, and 1 kg wt loss all 2/2 GI fluid losses/diarrhea. Patient did not feel he needed IV fluids today, able to drink recommended 2 L and will continue with electrolyte based drinks  - Monitor creat and lytes. Replete lytes PRN per SS. Monitor weight, I+O     6.  Mood:  Cont Paxil    Plan:  Neutropenic precautions  GCSF  Encouraged increased PO fluids  Stool kit for C.Diff provided   RTC daily    Yvonne Diallo, MSN, APRN, ACNP-BC  Pager: [phone]

## 2019-05-23 NOTE — PROGRESS NOTES
This is a recent snapshot of the patient's [facility] medical record.  For current drug dose and complete information and questions, call [phone] or In Basket pool, [facility] home infusion ([id number])  CSN Number:  [id number]

## 2019-05-23 NOTE — NURSING NOTE
Neupogen 480 MCG. Sub-Q injection left arm  . See MAR. Pt. Tolerated well.    Kathy VALLEJOA

## 2019-05-24 ENCOUNTER — APPOINTMENT (OUTPATIENT)
Dept: LAB | Facility: CLINIC | Age: 59
End: 2019-05-24
Attending: PHYSICIAN ASSISTANT
Payer: COMMERCIAL

## 2019-05-24 ENCOUNTER — INFUSION THERAPY VISIT (OUTPATIENT)
Dept: TRANSPLANT | Facility: CLINIC | Age: 59
End: 2019-05-24
Attending: PHYSICIAN ASSISTANT
Payer: COMMERCIAL

## 2019-05-24 VITALS
WEIGHT: 179.7 LBS | DIASTOLIC BLOOD PRESSURE: 45 MMHG | RESPIRATION RATE: 16 BRPM | SYSTOLIC BLOOD PRESSURE: 109 MMHG | OXYGEN SATURATION: 98 % | HEART RATE: 120 BPM | BODY MASS INDEX: 26.47 KG/M2 | TEMPERATURE: 98.6 F

## 2019-05-24 DIAGNOSIS — R19.7 DIARRHEA, UNSPECIFIED TYPE: ICD-10-CM

## 2019-05-24 DIAGNOSIS — C90.01 MULTIPLE MYELOMA IN REMISSION (H): Primary | ICD-10-CM

## 2019-05-24 LAB
ANION GAP SERPL CALCULATED.3IONS-SCNC: 6 MMOL/L (ref 3–14)
BLD PROD TYP BPU: NORMAL
BLD UNIT ID BPU: 0
BLOOD PRODUCT CODE: NORMAL
BPU ID: NORMAL
BUN SERPL-MCNC: 15 MG/DL (ref 7–30)
C DIFF TOX B STL QL: NEGATIVE
CALCIUM SERPL-MCNC: 9 MG/DL (ref 8.5–10.1)
CHLORIDE SERPL-SCNC: 108 MMOL/L (ref 94–109)
CO2 SERPL-SCNC: 25 MMOL/L (ref 20–32)
CREAT SERPL-MCNC: 0.89 MG/DL (ref 0.66–1.25)
DIFFERENTIAL METHOD BLD: ABNORMAL
ERYTHROCYTE [DISTWIDTH] IN BLOOD BY AUTOMATED COUNT: 12.5 % (ref 10–15)
GFR SERPL CREATININE-BSD FRML MDRD: >90 ML/MIN/{1.73_M2}
GLUCOSE SERPL-MCNC: 127 MG/DL (ref 70–99)
HCT VFR BLD AUTO: 27.1 % (ref 40–53)
HGB BLD-MCNC: 9.3 G/DL (ref 13.3–17.7)
MAGNESIUM SERPL-MCNC: 2.2 MG/DL (ref 1.6–2.3)
MCH RBC QN AUTO: 34.1 PG (ref 26.5–33)
MCHC RBC AUTO-ENTMCNC: 34.3 G/DL (ref 31.5–36.5)
MCV RBC AUTO: 99 FL (ref 78–100)
PLATELET # BLD AUTO: 25 10E9/L (ref 150–450)
POTASSIUM SERPL-SCNC: 4.2 MMOL/L (ref 3.4–5.3)
RBC # BLD AUTO: 2.73 10E12/L (ref 4.4–5.9)
SODIUM SERPL-SCNC: 139 MMOL/L (ref 133–144)
SPECIMEN SOURCE: NORMAL
TRANSFUSION STATUS PATIENT QL: NORMAL
TRANSFUSION STATUS PATIENT QL: NORMAL
WBC # BLD AUTO: 0 10E9/L (ref 4–11)

## 2019-05-24 PROCEDURE — 87493 C DIFF AMPLIFIED PROBE: CPT | Performed by: NURSE PRACTITIONER

## 2019-05-24 PROCEDURE — G0463 HOSPITAL OUTPT CLINIC VISIT: HCPCS | Mod: ZF

## 2019-05-24 PROCEDURE — 80048 BASIC METABOLIC PNL TOTAL CA: CPT | Performed by: NURSE PRACTITIONER

## 2019-05-24 PROCEDURE — 86900 BLOOD TYPING SEROLOGIC ABO: CPT | Performed by: PHYSICIAN ASSISTANT

## 2019-05-24 PROCEDURE — 86901 BLOOD TYPING SEROLOGIC RH(D): CPT | Performed by: PHYSICIAN ASSISTANT

## 2019-05-24 PROCEDURE — 85027 COMPLETE CBC AUTOMATED: CPT

## 2019-05-24 PROCEDURE — 25000128 H RX IP 250 OP 636: Mod: ZF | Performed by: PHYSICIAN ASSISTANT

## 2019-05-24 PROCEDURE — 83735 ASSAY OF MAGNESIUM: CPT | Performed by: NURSE PRACTITIONER

## 2019-05-24 PROCEDURE — 86850 RBC ANTIBODY SCREEN: CPT | Performed by: PHYSICIAN ASSISTANT

## 2019-05-24 PROCEDURE — 96372 THER/PROPH/DIAG INJ SC/IM: CPT

## 2019-05-24 RX ORDER — HEPARIN SODIUM,PORCINE 10 UNIT/ML
5 VIAL (ML) INTRAVENOUS
Status: CANCELLED | OUTPATIENT
Start: 2019-05-25

## 2019-05-24 RX ORDER — HEPARIN SODIUM,PORCINE 10 UNIT/ML
5 VIAL (ML) INTRAVENOUS
Status: DISCONTINUED | OUTPATIENT
Start: 2019-05-24 | End: 2019-05-24 | Stop reason: HOSPADM

## 2019-05-24 RX ADMIN — HEPARIN, PORCINE (PF) 10 UNIT/ML INTRAVENOUS SYRINGE 5 ML: at 08:14

## 2019-05-24 RX ADMIN — FILGRASTIM 480 MCG: 480 INJECTION, SOLUTION INTRAVENOUS; SUBCUTANEOUS at 08:54

## 2019-05-24 RX ADMIN — HEPARIN, PORCINE (PF) 10 UNIT/ML INTRAVENOUS SYRINGE 5 ML: at 08:13

## 2019-05-24 ASSESSMENT — PAIN SCALES - GENERAL: PAINLEVEL: NO PAIN (0)

## 2019-05-24 NOTE — NURSING NOTE
"Oncology Rooming Note    May 24, 2019 8:29 AM   Angel Yanez is a 58 year old male who presents for:    Chief Complaint   Patient presents with     Blood Draw     Labs drawn via CVC by RN in lab. VS taken.      RECHECK     RTN- MM     Initial Vitals: /45 (BP Location: Right arm, Patient Position: Sitting, Cuff Size: Adult Regular)   Pulse 120   Temp 98.6  F (37  C) (Oral)   Resp 16   Wt 81.5 kg (179 lb 11.2 oz)   SpO2 98%   BMI 26.47 kg/m   Estimated body mass index is 26.47 kg/m  as calculated from the following:    Height as of 5/21/19: 1.755 m (5' 9.09\").    Weight as of this encounter: 81.5 kg (179 lb 11.2 oz). Body surface area is 1.99 meters squared.  No Pain (0) Comment: Data Unavailable   No LMP for male patient.  Allergies reviewed: Yes  Medications reviewed: Yes    Medications: Medication refills not needed today.  Pharmacy name entered into EPIC:    [facility] - [place] - [address] [facility] - [place], [place] - [address] [facility]    Clinical concerns: None       Kathy Perez CMA              "

## 2019-05-24 NOTE — PROGRESS NOTES
BMT Clinic Note        Patient ID:  Angel Yanez is a 58 year old male, day + 7 s/p 2nd auto PBSCT for IgG Kappa MM      Diagnosis MM Multiple myeloma  HCT Type Autologous    Prep Regimen Cytoxan  Melphalan   Donor Source No data was found    GVHD Prophylaxis No  Primary BMT Provider Naveed      Angel was admitted on 5/16/2019 for autologous bone marrow transplant for MM.     INTERVAL  HISTORY     Guille was seen today for a follow up visit. Feels a little bit better today compared to yesterday. Diarrhea is a bit better, but did drop off a stool sample in the lab. Drank 60+ oz of water yesterday. No nausea, abdominal pain, or bleeding. No fevers, chills, SOB, cough, or congestion.     Review of Systems: 10 point ROS negative except as noted above.     /45 (BP Location: Right arm, Patient Position: Sitting, Cuff Size: Adult Regular)   Pulse 120   Temp 98.6  F (37  C) (Oral)   Resp 16   Wt 81.5 kg (179 lb 11.2 oz)   SpO2 98%   BMI 26.47 kg/m       Heart rate on exam ~100    Wt Readings from Last 5 Encounters:   05/24/19 81.5 kg (179 lb 11.2 oz)   05/23/19 80.7 kg (177 lb 14.4 oz)   05/22/19 81.1 kg (178 lb 14.4 oz)   05/21/19 81.8 kg (180 lb 6.4 oz)   05/20/19 82.1 kg (180 lb 14.4 oz)        General: NAD, interactive, appropriate    Eyes: : FARRAH, sclera anicteric   Nose/Mouth/Throat: OP clear, buccal mucosa moist, no ulcerations   Lungs: CTA bilaterally  Cardiovascular: Tachycardic but regular, no M/R/G   Abdominal/Rectal: +BS, soft, NT, ND  Skin: no rashes or petechaie  Neuro: A&O   Access: Hsieh right side chest wall, dressing intact, NT     Labs  Lab Results   Component Value Date    WBC 0.0 (LL) 05/24/2019    ANEU 1.3 (L) 05/23/2019    HGB 9.3 (L) 05/24/2019    HCT 27.1 (L) 05/24/2019    PLT 25 (LL) 05/24/2019     05/24/2019    POTASSIUM 4.2 05/24/2019    CHLORIDE 108 05/24/2019    CO2 25 05/24/2019     (H) 05/24/2019    BUN 15 05/24/2019    CR 0.89 05/24/2019    MAG 2.2 05/24/2019     INR 1.14 05/16/2019    BILITOTAL 0.2 05/17/2019    AST 5 05/17/2019    ALT 18 05/17/2019    ALKPHOS 49 05/17/2019    PROTTOTAL 6.0 (L) 05/17/2019    ALBUMIN 3.2 (L) 05/17/2019       OVERALL PLAN   Angel Yanez is a 58 year old male, day + 7 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  BMT/MM: Received Melphalan D-1. Allopurinol complete .   - 5/17 day 0, transplant. Cell dose was only 0.639x10^6. (Marrow [place] - known poor cell dose as failed chemo-mobilization 1/2019.) Expect possible slower engraftment.   - Continue GCSF  until ANC>2500 x2 consecutive days. Taking Claritin  -  Re-stage per protocol      2.  HEME: Keep Hgb>8 and plts>10K. No transfusions today. Prep plts for tomorrow.   - Pancytopenic secondary to transplant/chemotherapy  - No pre-meds.    3.  ID: Afebrile.  - Diarrhea: c-diff neg.   - proph with HD AC, Fluconazole &  Azithro (hives with Moxifloxicin)  -PVP proph day +28     4.  GI: No NV.   - Diarrhea as above, C.Diff neg- start imodium prn    - Ativan and compazine PRN N/V.  Not needing yet  - GI prophy- omeprazole.      5.  FEN/Renal: Lytes & creat stable.   - Cr and lytes WNL.     6.  Mood:  Cont Paxil    RTC: Daily for labs, provider visit, and infusion for possible plts/RBC/IVF    Fei Meng PA-C  [phone]

## 2019-05-25 ENCOUNTER — APPOINTMENT (OUTPATIENT)
Dept: LAB | Facility: CLINIC | Age: 59
End: 2019-05-25
Attending: INTERNAL MEDICINE
Payer: COMMERCIAL

## 2019-05-25 ENCOUNTER — INFUSION THERAPY VISIT (OUTPATIENT)
Dept: TRANSPLANT | Facility: CLINIC | Age: 59
End: 2019-05-25
Attending: INTERNAL MEDICINE
Payer: COMMERCIAL

## 2019-05-25 VITALS
BODY MASS INDEX: 26.17 KG/M2 | SYSTOLIC BLOOD PRESSURE: 106 MMHG | OXYGEN SATURATION: 100 % | TEMPERATURE: 98.3 F | HEART RATE: 102 BPM | WEIGHT: 177.7 LBS | DIASTOLIC BLOOD PRESSURE: 70 MMHG | RESPIRATION RATE: 16 BRPM

## 2019-05-25 DIAGNOSIS — C90.01 MULTIPLE MYELOMA IN REMISSION (H): Primary | ICD-10-CM

## 2019-05-25 LAB
ANION GAP SERPL CALCULATED.3IONS-SCNC: 4 MMOL/L (ref 3–14)
BLD PROD TYP BPU: NORMAL
BLD PROD TYP BPU: NORMAL
BLD UNIT ID BPU: 0
BLD UNIT ID BPU: 0
BLOOD PRODUCT CODE: NORMAL
BLOOD PRODUCT CODE: NORMAL
BPU ID: NORMAL
BPU ID: NORMAL
BUN SERPL-MCNC: 21 MG/DL (ref 7–30)
CALCIUM SERPL-MCNC: 8.8 MG/DL (ref 8.5–10.1)
CHLORIDE SERPL-SCNC: 109 MMOL/L (ref 94–109)
CO2 SERPL-SCNC: 25 MMOL/L (ref 20–32)
CREAT SERPL-MCNC: 0.83 MG/DL (ref 0.66–1.25)
DIFFERENTIAL METHOD BLD: ABNORMAL
ERYTHROCYTE [DISTWIDTH] IN BLOOD BY AUTOMATED COUNT: 12.1 % (ref 10–15)
GFR SERPL CREATININE-BSD FRML MDRD: >90 ML/MIN/{1.73_M2}
GLUCOSE SERPL-MCNC: 108 MG/DL (ref 70–99)
HCT VFR BLD AUTO: 27.4 % (ref 40–53)
HGB BLD-MCNC: 9.2 G/DL (ref 13.3–17.7)
MCH RBC QN AUTO: 32.9 PG (ref 26.5–33)
MCHC RBC AUTO-ENTMCNC: 33.6 G/DL (ref 31.5–36.5)
MCV RBC AUTO: 98 FL (ref 78–100)
PLATELET # BLD AUTO: 12 10E9/L (ref 150–450)
POTASSIUM SERPL-SCNC: 4.4 MMOL/L (ref 3.4–5.3)
RBC # BLD AUTO: 2.8 10E12/L (ref 4.4–5.9)
SODIUM SERPL-SCNC: 138 MMOL/L (ref 133–144)
TRANSFUSION STATUS PATIENT QL: NORMAL
WBC # BLD AUTO: 0 10E9/L (ref 4–11)

## 2019-05-25 PROCEDURE — 25000128 H RX IP 250 OP 636: Mod: ZF | Performed by: PHYSICIAN ASSISTANT

## 2019-05-25 PROCEDURE — 80048 BASIC METABOLIC PNL TOTAL CA: CPT | Performed by: PHYSICIAN ASSISTANT

## 2019-05-25 PROCEDURE — 40000268 ZZH STATISTIC NO CHARGES: Mod: ZF

## 2019-05-25 PROCEDURE — 85027 COMPLETE CBC AUTOMATED: CPT

## 2019-05-25 PROCEDURE — 96372 THER/PROPH/DIAG INJ SC/IM: CPT

## 2019-05-25 RX ORDER — HEPARIN SODIUM,PORCINE 10 UNIT/ML
5 VIAL (ML) INTRAVENOUS
Status: DISCONTINUED | OUTPATIENT
Start: 2019-05-25 | End: 2019-05-25 | Stop reason: HOSPADM

## 2019-05-25 RX ORDER — HEPARIN SODIUM,PORCINE 10 UNIT/ML
5 VIAL (ML) INTRAVENOUS
Status: CANCELLED | OUTPATIENT
Start: 2019-05-26

## 2019-05-25 RX ADMIN — HEPARIN, PORCINE (PF) 10 UNIT/ML INTRAVENOUS SYRINGE 5 ML: at 08:04

## 2019-05-25 RX ADMIN — HEPARIN, PORCINE (PF) 10 UNIT/ML INTRAVENOUS SYRINGE 5 ML: at 08:06

## 2019-05-25 RX ADMIN — FILGRASTIM 480 MCG: 480 INJECTION, SOLUTION INTRAVENOUS; SUBCUTANEOUS at 08:06

## 2019-05-25 ASSESSMENT — PAIN SCALES - GENERAL: PAINLEVEL: NO PAIN (0)

## 2019-05-25 NOTE — PROGRESS NOTES
Chief Complaint   Patient presents with     RECHECK     no transfusions neededtoday, labs within parameters

## 2019-05-25 NOTE — PROGRESS NOTES
BMT Clinic Note  May 25, 2019          Patient ID:  Angel Yanez is a 58 year old male, day + 8 s/p 2nd auto PBSCT for IgG Kappa MM      Diagnosis MM Multiple myeloma  HCT Type Autologous    Prep Regimen Cytoxan  Melphalan   Donor Source No data was found    GVHD Prophylaxis No  Primary BMT Provider Naveed Ortiz was admitted on 5/16/2019 for autologous bone marrow transplant for MM.     INTERVAL  HISTORY     Guille was seen today for a follow up visit. Still having loose stools  But after Cdiff PCR neg taking imodium and now improvedAte a whole pizza last night.Has some soreness in throat but no ulcers in mouth  Review of Systems: 10 point ROS negative except as noted above.     /70   Pulse 102   Temp 98.3  F (36.8  C)   Resp 16   Wt 80.6 kg (177 lb 11.2 oz)   SpO2 100%   BMI 26.17 kg/m       General: NAD, interactive, appropriate    Eyes: : FARRAH, sclera anicteric   Nose/Mouth/Throat: OP clear, buccal mucosa moist, no ulcerations Teeth impression noted in right buccal mucosa  Lungs: CTA bilaterally  Cardiovascular: RRR, no M/R/G   Abdominal/Rectal: +BS, soft, NT, ND, No HSM   Skin: no rashes or petechaie  Neuro: A&O   Access: Hsieh right side chest wall, dressing intact     Labs  Lab Results   Component Value Date    WBC 0.0 (LL) 05/25/2019    ANEU 1.3 (L) 05/23/2019    HGB 9.2 (L) 05/25/2019    HCT 27.4 (L) 05/25/2019    PLT 12 (LL) 05/25/2019     05/25/2019    POTASSIUM 4.4 05/25/2019    CHLORIDE 109 05/25/2019    CO2 25 05/25/2019     (H) 05/25/2019    BUN 21 05/25/2019    CR 0.83 05/25/2019    MAG 2.2 05/24/2019    INR 1.14 05/16/2019    BILITOTAL 0.2 05/17/2019    AST 5 05/17/2019    ALT 18 05/17/2019    ALKPHOS 49 05/17/2019    PROTTOTAL 6.0 (L) 05/17/2019    ALBUMIN 3.2 (L) 05/17/2019       OVERALL PLAN   Angel Yanez is a 58 year old male, day + 8 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  BMT/MM: Received Melphalan D-1. Allopurinol complete .   - 5/17 day 0,  transplant. Cell dose was only 0.639x10^6. (Marrow [place] - known poor cell dose as failed chemo-mobilization 1/2019.)   - Continue GCSF  until ANC>2500 x2 consecutive days.  - Taking Claritin  -  Re-stage per protocol      2.  HEME: Keep Hgb>8 and plts>10K. No transfusions today  - Discussed neutropenic precautions 5/23  - No pre-meds.Platelets 12k Will need tomorrow    3.  ID: Afebrile. New onset diarrhea likely d/t chemotherapy but C.Diff negative Can take imodium PRN    - proph with HD AC, Fluconazole &  Azithro (hives with Moxifloxicin  -PVP proph day +28     4.  GI: No NV.   - Diarrhea as above,  C.Diff neg start imodium   - Ativan and compazine PRN N/V.  Not needing yet  - GI prophy- omeprazole.      5.  FEN/Renal: Lytes & creat stable.   - Cr OK 0.89,   - Monitor creat and lytes. Replete lytes PRN per SS. Monitor weight, I+O     6.  Mood:  Cont Paxil    Plan:  Neutropenic precautions  Platelets tomorrow  GCSF  Encouraged increased PO fluid Imodium   RTC daily    Walter Saha MD  [phone]

## 2019-05-25 NOTE — NURSING NOTE
"Oncology Rooming Note    May 25, 2019 8:01 AM   Angel Yanez is a 58 year old male who presents for:    Chief Complaint   Patient presents with     RECHECK     post bmt for MM here for labs, md visit and gcsf inj     Initial Vitals: /70   Pulse 102   Temp 98.3  F (36.8  C)   Resp 16   Wt 80.6 kg (177 lb 11.2 oz)   SpO2 100%   BMI 26.17 kg/m   Estimated body mass index is 26.17 kg/m  as calculated from the following:    Height as of 5/21/19: 1.755 m (5' 9.09\").    Weight as of this encounter: 80.6 kg (177 lb 11.2 oz). Body surface area is 1.98 meters squared.  No Pain (0) Comment: Data Unavailable   No LMP for male patient.  Allergies reviewed: Yes  Medications reviewed: Yes    Medications: Medication refills not needed today.  Pharmacy name entered into EPIC:    [facility] - [place], [place] - [address] [facility] - [place], [place] - [address]    Clinical concerns: pt reports a mouth sore in the back of his throat       Eloisa King RN              "

## 2019-05-26 ENCOUNTER — APPOINTMENT (OUTPATIENT)
Dept: GENERAL RADIOLOGY | Facility: CLINIC | Age: 59
DRG: 809 | End: 2019-05-26
Attending: EMERGENCY MEDICINE
Payer: COMMERCIAL

## 2019-05-26 ENCOUNTER — INFUSION THERAPY VISIT (OUTPATIENT)
Dept: TRANSPLANT | Facility: CLINIC | Age: 59
End: 2019-05-26
Attending: INTERNAL MEDICINE
Payer: COMMERCIAL

## 2019-05-26 ENCOUNTER — HOSPITAL ENCOUNTER (INPATIENT)
Facility: CLINIC | Age: 59
LOS: 2 days | Discharge: HOME | DRG: 809 | End: 2019-05-28
Attending: EMERGENCY MEDICINE | Admitting: INTERNAL MEDICINE
Payer: COMMERCIAL

## 2019-05-26 VITALS
OXYGEN SATURATION: 97 % | SYSTOLIC BLOOD PRESSURE: 100 MMHG | HEART RATE: 107 BPM | RESPIRATION RATE: 18 BRPM | TEMPERATURE: 99.5 F | DIASTOLIC BLOOD PRESSURE: 66 MMHG

## 2019-05-26 VITALS
RESPIRATION RATE: 18 BRPM | WEIGHT: 178.5 LBS | BODY MASS INDEX: 26.29 KG/M2 | OXYGEN SATURATION: 97 % | HEART RATE: 75 BPM | DIASTOLIC BLOOD PRESSURE: 65 MMHG | SYSTOLIC BLOOD PRESSURE: 99 MMHG | TEMPERATURE: 99.2 F

## 2019-05-26 DIAGNOSIS — C90.01 MULTIPLE MYELOMA IN REMISSION (H): Primary | ICD-10-CM

## 2019-05-26 DIAGNOSIS — C90.01 MULTIPLE MYELOMA IN REMISSION (H): ICD-10-CM

## 2019-05-26 DIAGNOSIS — D70.9 NEUTROPENIC FEVER (H): ICD-10-CM

## 2019-05-26 DIAGNOSIS — D70.9 NEUTROPENIA, UNSPECIFIED TYPE (H): ICD-10-CM

## 2019-05-26 DIAGNOSIS — C90.00 MULTIPLE MYELOMA NOT HAVING ACHIEVED REMISSION (H): ICD-10-CM

## 2019-05-26 DIAGNOSIS — R50.81 NEUTROPENIC FEVER (H): ICD-10-CM

## 2019-05-26 DIAGNOSIS — Z94.81 STATUS POST BONE MARROW TRANSPLANT (H): ICD-10-CM

## 2019-05-26 DIAGNOSIS — R50.81 FEVER PRESENTING WITH CONDITIONS CLASSIFIED ELSEWHERE: ICD-10-CM

## 2019-05-26 LAB
ALBUMIN SERPL-MCNC: 3.3 G/DL (ref 3.4–5)
ALBUMIN UR-MCNC: NEGATIVE MG/DL
ALP SERPL-CCNC: 54 U/L (ref 40–150)
ALT SERPL W P-5'-P-CCNC: 17 U/L (ref 0–70)
ANION GAP SERPL CALCULATED.3IONS-SCNC: 6 MMOL/L (ref 3–14)
ANION GAP SERPL CALCULATED.3IONS-SCNC: 8 MMOL/L (ref 3–14)
APPEARANCE UR: CLEAR
AST SERPL W P-5'-P-CCNC: 8 U/L (ref 0–45)
BILIRUB SERPL-MCNC: 0.3 MG/DL (ref 0.2–1.3)
BILIRUB UR QL STRIP: NEGATIVE
BLD PROD TYP BPU: NORMAL
BLD UNIT ID BPU: 0
BLOOD PRODUCT CODE: NORMAL
BPU ID: NORMAL
BUN SERPL-MCNC: 15 MG/DL (ref 7–30)
BUN SERPL-MCNC: 16 MG/DL (ref 7–30)
CALCIUM SERPL-MCNC: 8 MG/DL (ref 8.5–10.1)
CALCIUM SERPL-MCNC: 8.6 MG/DL (ref 8.5–10.1)
CHLORIDE SERPL-SCNC: 106 MMOL/L (ref 94–109)
CHLORIDE SERPL-SCNC: 106 MMOL/L (ref 94–109)
CO2 SERPL-SCNC: 24 MMOL/L (ref 20–32)
CO2 SERPL-SCNC: 27 MMOL/L (ref 20–32)
COLOR UR AUTO: YELLOW
CREAT SERPL-MCNC: 0.86 MG/DL (ref 0.66–1.25)
CREAT SERPL-MCNC: 0.92 MG/DL (ref 0.66–1.25)
DIFFERENTIAL METHOD BLD: ABNORMAL
DIFFERENTIAL METHOD BLD: ABNORMAL
ERYTHROCYTE [DISTWIDTH] IN BLOOD BY AUTOMATED COUNT: 11.9 % (ref 10–15)
ERYTHROCYTE [DISTWIDTH] IN BLOOD BY AUTOMATED COUNT: 12 % (ref 10–15)
GFR SERPL CREATININE-BSD FRML MDRD: 90 ML/MIN/{1.73_M2}
GFR SERPL CREATININE-BSD FRML MDRD: >90 ML/MIN/{1.73_M2}
GLUCOSE SERPL-MCNC: 101 MG/DL (ref 70–99)
GLUCOSE SERPL-MCNC: 126 MG/DL (ref 70–99)
GLUCOSE UR STRIP-MCNC: NEGATIVE MG/DL
HCT VFR BLD AUTO: 23.6 % (ref 40–53)
HCT VFR BLD AUTO: 25.7 % (ref 40–53)
HGB BLD-MCNC: 7.7 G/DL (ref 13.3–17.7)
HGB BLD-MCNC: 8.8 G/DL (ref 13.3–17.7)
HGB UR QL STRIP: NEGATIVE
KETONES UR STRIP-MCNC: NEGATIVE MG/DL
LACTATE BLD-SCNC: 1.1 MMOL/L (ref 0.7–2)
LEUKOCYTE ESTERASE UR QL STRIP: NEGATIVE
MAGNESIUM SERPL-MCNC: 2 MG/DL (ref 1.6–2.3)
MCH RBC QN AUTO: 32.2 PG (ref 26.5–33)
MCH RBC QN AUTO: 33.7 PG (ref 26.5–33)
MCHC RBC AUTO-ENTMCNC: 32.6 G/DL (ref 31.5–36.5)
MCHC RBC AUTO-ENTMCNC: 34.2 G/DL (ref 31.5–36.5)
MCV RBC AUTO: 99 FL (ref 78–100)
MCV RBC AUTO: 99 FL (ref 78–100)
NITRATE UR QL: NEGATIVE
PH UR STRIP: 6 PH (ref 5–7)
PLATELET # BLD AUTO: 37 10E9/L (ref 150–450)
PLATELET # BLD AUTO: 9 10E9/L (ref 150–450)
POTASSIUM SERPL-SCNC: 3.8 MMOL/L (ref 3.4–5.3)
POTASSIUM SERPL-SCNC: 4.1 MMOL/L (ref 3.4–5.3)
PROT SERPL-MCNC: 6 G/DL (ref 6.8–8.8)
RBC # BLD AUTO: 2.39 10E12/L (ref 4.4–5.9)
RBC # BLD AUTO: 2.61 10E12/L (ref 4.4–5.9)
SODIUM SERPL-SCNC: 138 MMOL/L (ref 133–144)
SODIUM SERPL-SCNC: 139 MMOL/L (ref 133–144)
SOURCE: NORMAL
SP GR UR STRIP: 1.02 (ref 1–1.03)
TRANSFUSION STATUS PATIENT QL: NORMAL
UROBILINOGEN UR STRIP-MCNC: NORMAL MG/DL (ref 0–2)
WBC # BLD AUTO: 0 10E9/L (ref 4–11)
WBC # BLD AUTO: 0 10E9/L (ref 4–11)

## 2019-05-26 PROCEDURE — 99285 EMERGENCY DEPT VISIT HI MDM: CPT | Mod: 25 | Performed by: EMERGENCY MEDICINE

## 2019-05-26 PROCEDURE — 96372 THER/PROPH/DIAG INJ SC/IM: CPT

## 2019-05-26 PROCEDURE — 25000128 H RX IP 250 OP 636: Performed by: EMERGENCY MEDICINE

## 2019-05-26 PROCEDURE — 85027 COMPLETE CBC AUTOMATED: CPT

## 2019-05-26 PROCEDURE — 80053 COMPREHEN METABOLIC PANEL: CPT | Performed by: EMERGENCY MEDICINE

## 2019-05-26 PROCEDURE — 25000132 ZZH RX MED GY IP 250 OP 250 PS 637: Mod: ZF | Performed by: INTERNAL MEDICINE

## 2019-05-26 PROCEDURE — 80048 BASIC METABOLIC PNL TOTAL CA: CPT | Performed by: INTERNAL MEDICINE

## 2019-05-26 PROCEDURE — 71046 X-RAY EXAM CHEST 2 VIEWS: CPT

## 2019-05-26 PROCEDURE — 25000128 H RX IP 250 OP 636: Mod: ZF | Performed by: INTERNAL MEDICINE

## 2019-05-26 PROCEDURE — P9037 PLATE PHERES LEUKOREDU IRRAD: HCPCS | Performed by: PHYSICIAN ASSISTANT

## 2019-05-26 PROCEDURE — 81003 URINALYSIS AUTO W/O SCOPE: CPT | Performed by: EMERGENCY MEDICINE

## 2019-05-26 PROCEDURE — 96365 THER/PROPH/DIAG IV INF INIT: CPT | Performed by: EMERGENCY MEDICINE

## 2019-05-26 PROCEDURE — 25000128 H RX IP 250 OP 636: Mod: ZF | Performed by: PHYSICIAN ASSISTANT

## 2019-05-26 PROCEDURE — 99285 EMERGENCY DEPT VISIT HI MDM: CPT | Mod: Z6 | Performed by: EMERGENCY MEDICINE

## 2019-05-26 PROCEDURE — 87040 BLOOD CULTURE FOR BACTERIA: CPT | Performed by: EMERGENCY MEDICINE

## 2019-05-26 PROCEDURE — 36430 TRANSFUSION BLD/BLD COMPNT: CPT

## 2019-05-26 PROCEDURE — 83735 ASSAY OF MAGNESIUM: CPT | Performed by: INTERNAL MEDICINE

## 2019-05-26 PROCEDURE — 85025 COMPLETE CBC W/AUTO DIFF WBC: CPT | Performed by: INTERNAL MEDICINE

## 2019-05-26 PROCEDURE — 20600000 ZZH R&B BMT

## 2019-05-26 PROCEDURE — 83605 ASSAY OF LACTIC ACID: CPT | Performed by: EMERGENCY MEDICINE

## 2019-05-26 RX ORDER — HEPARIN SODIUM,PORCINE 10 UNIT/ML
5 VIAL (ML) INTRAVENOUS
Status: DISCONTINUED | OUTPATIENT
Start: 2019-05-26 | End: 2019-05-26 | Stop reason: HOSPADM

## 2019-05-26 RX ORDER — HEPARIN SODIUM,PORCINE 10 UNIT/ML
5 VIAL (ML) INTRAVENOUS
Status: CANCELLED | OUTPATIENT
Start: 2019-05-27

## 2019-05-26 RX ORDER — CEFEPIME HYDROCHLORIDE 1 G/1
1 INJECTION, POWDER, FOR SOLUTION INTRAMUSCULAR; INTRAVENOUS ONCE
Status: COMPLETED | OUTPATIENT
Start: 2019-05-26 | End: 2019-05-26

## 2019-05-26 RX ORDER — ACETAMINOPHEN 325 MG/1
650 TABLET ORAL EVERY 4 HOURS PRN
Status: DISCONTINUED | OUTPATIENT
Start: 2019-05-26 | End: 2019-05-26 | Stop reason: HOSPADM

## 2019-05-26 RX ORDER — DIPHENHYDRAMINE HCL 25 MG
25 CAPSULE ORAL EVERY 6 HOURS PRN
Status: CANCELLED
Start: 2019-05-26

## 2019-05-26 RX ORDER — DIPHENHYDRAMINE HCL 25 MG
25 CAPSULE ORAL EVERY 6 HOURS PRN
Status: DISCONTINUED | OUTPATIENT
Start: 2019-05-26 | End: 2019-05-26 | Stop reason: HOSPADM

## 2019-05-26 RX ORDER — ACETAMINOPHEN 325 MG/1
650 TABLET ORAL EVERY 4 HOURS PRN
Status: CANCELLED
Start: 2019-05-26

## 2019-05-26 RX ADMIN — FILGRASTIM 480 MCG: 480 INJECTION, SOLUTION INTRAVENOUS; SUBCUTANEOUS at 08:34

## 2019-05-26 RX ADMIN — HEPARIN, PORCINE (PF) 10 UNIT/ML INTRAVENOUS SYRINGE 5 ML: at 08:00

## 2019-05-26 RX ADMIN — CEFEPIME HYDROCHLORIDE 1 G: 1 INJECTION, POWDER, FOR SOLUTION INTRAMUSCULAR; INTRAVENOUS at 22:26

## 2019-05-26 RX ADMIN — ACETAMINOPHEN 650 MG: 325 TABLET ORAL at 08:37

## 2019-05-26 RX ADMIN — DIPHENHYDRAMINE HYDROCHLORIDE 25 MG: 25 CAPSULE ORAL at 08:37

## 2019-05-26 ASSESSMENT — ENCOUNTER SYMPTOMS
FEVER: 1
COUGH: 1

## 2019-05-26 ASSESSMENT — PAIN SCALES - GENERAL: PAINLEVEL: MILD PAIN (2)

## 2019-05-26 ASSESSMENT — MIFFLIN-ST. JEOR: SCORE: 1612.32

## 2019-05-26 NOTE — NURSING NOTE
Chief Complaint   Patient presents with     Blood Draw     Labs drawn via CVC by RN in lab. Lines flushed and hep locked. VS taken.     Clau Sibley RN

## 2019-05-26 NOTE — PROGRESS NOTES
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     INTERVAL  HISTORY     Guille was seen today for a follow up visit.No loose stools now last stool 24h ago. Has some left TMJ pain he thinks is related to neupogen No bleeding.Has some soreness in throat and maxillary gingiva but no ulcers in mouth  Review of Systems: 10 point ROS negative except as noted above.     There were no vitals taken for this visit.     General: NAD, interactive, appropriate    Eyes: : FARRAH, sclera anicteric   Nose/Mouth/Throat: OP clear, buccal mucosa moist, no ulcerations Teeth impression noted in right buccal mucosa  Lungs: CTA bilaterally  Cardiovascular: RRR, no M/R/G   Abdominal/Rectal: +BS, soft, NT, ND, No HSM   Skin: no rashes or petechaie  Neuro: A&O   Access: Hsieh right side chest wall, dressing intact     Labs  Lab Results   Component Value Date    WBC 0.0 (LL) 05/25/2019    ANEU 1.3 (L) 05/23/2019    HGB 9.2 (L) 05/25/2019    HCT 27.4 (L) 05/25/2019    PLT 12 (LL) 05/25/2019     05/25/2019    POTASSIUM 4.4 05/25/2019    CHLORIDE 109 05/25/2019    CO2 25 05/25/2019     (H) 05/25/2019    BUN 21 05/25/2019    CR 0.83 05/25/2019    MAG 2.2 05/24/2019    INR 1.14 05/16/2019    BILITOTAL 0.2 05/17/2019    AST 5 05/17/2019    ALT 18 05/17/2019    ALKPHOS 49 05/17/2019    PROTTOTAL 6.0 (L) 05/17/2019    ALBUMIN 3.2 (L) 05/17/2019       OVERALL PLAN   Angel Yanez is a 58 year old male, day + 9 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  BMT/MM: Received Melphalan D-1. Allopurinol complete .   - 5/17 day 0, transplant. Cell dose was only 0.639x10^6. ([facility] [place] - known poor  cell dose as failed chemo-mobilization 1/2019.)   - Continue GCSF  until ANC>2500 x2 consecutive days.  - Taking Claritin  -  Re-stage per protocol      2.  HEME: Keep Hgb>8 and plts>10K. No transfusions today  - Discussed neutropenic precautions 5/23  - No pre-meds.Platelets 8k Will give today     3.  ID: Afebrile.C.Diff negative Can take imodium PRN    - proph with HD AC, Fluconazole &  Azithro (hives with Moxifloxicin  -PVP proph day +28     4.  GI: No NV.   - Diarrhea improved some sore throat,  C.Diff neg  imodium prn  - Ativan and compazine PRN N/V.  Not needing yet  - GI prophy- omeprazole.      5.  FEN/Renal: Lytes & creat stable.   Mg 2.0   - Monitor creat and lytes. Replete lytes PRN per SS. Monitor weight, I+O     6.  Mood:  Cont Paxil    Plan:  Neutropenic precautions  Platelets today  GCSF  Encouraged increased PO fluid Imodium PRN  RTC daily    Walter Saha MD  [phone]

## 2019-05-26 NOTE — PROGRESS NOTES
Infusion Nursing Note:  Angel Yanez presents today for Plts.    Patient [facility] today: [facility]   present during visit today: Not Applicable.    Note: Patient was given platelets today for a count of 9K.  He signed blood consent today with MD.  Patient has always had pre meds in the past so he was given 25mg oral benadryl and 650 mg tylenol.    GCSF given today.     Intravenous Access:  Hsieh.    Treatment Conditions:  Lab Results   Component Value Date    HGB 8.8 05/26/2019     Lab Results   Component Value Date    WBC 0.0 05/26/2019      Lab Results   Component Value Date    ANEU 1.3 05/23/2019     Lab Results   Component Value Date    PLT 9 05/26/2019      Lab Results   Component Value Date     05/26/2019                   Lab Results   Component Value Date    POTASSIUM 3.8 05/26/2019           Lab Results   Component Value Date    MAG 2.0 05/26/2019            Lab Results   Component Value Date    CR 0.86 05/26/2019                   Lab Results   Component Value Date    ZHEN 8.6 05/26/2019                Lab Results   Component Value Date    BILITOTAL 0.2 05/17/2019           Lab Results   Component Value Date    ALBUMIN 3.2 05/17/2019                    Lab Results   Component Value Date    ALT 18 05/17/2019           Lab Results   Component Value Date    AST 5 05/17/2019       Blood transfusion consent signed 5/26/19.      Post Infusion Assessment:  Patient tolerated infusion without incident.  Patient tolerated injection without incident.       Discharge Plan:   [facility]  Patient will return tomorrow for next appointment.   Patient discharged in stable condition accompanied by: wife.  Departure Mode: Ambulatory.    CARLO CASTRO RN

## 2019-05-26 NOTE — NURSING NOTE
"Oncology Rooming Note    May 26, 2019 11:25 AM   Angel Yanez is a 58 year old male who presents for:    Chief Complaint   Patient presents with     Blood Draw     Labs drawn via CVC by RN in lab. Lines flushed and hep locked. VS taken.     Imm/Inj     filgrastim (NEUPOGEN) injection 480 mcg        Initial Vitals: BP 99/65   Pulse 75   Temp 99.2  F (37.3  C) (Oral)   Resp 18   Wt 81 kg (178 lb 8 oz)   SpO2 97%   BMI 26.29 kg/m   Estimated body mass index is 26.29 kg/m  as calculated from the following:    Height as of 5/21/19: 1.755 m (5' 9.09\").    Weight as of this encounter: 81 kg (178 lb 8 oz). Body surface area is 1.99 meters squared.  Mild Pain (2) Comment: Data Unavailable   No LMP for male patient.  Allergies reviewed: Yes  Medications reviewed: Yes    Medications: Medication refills not needed today.  Pharmacy name entered into EPIC:    [facility] [place] [facility] [address] [facility] - [place], [place] [address]    Clinical concerns: none    CARLO CASTRO RN              "

## 2019-05-27 PROBLEM — R50.81 NEUTROPENIA WITH FEVER (H): Status: ACTIVE | Noted: 2019-05-27

## 2019-05-27 PROBLEM — D70.9 NEUTROPENIA WITH FEVER (H): Status: ACTIVE | Noted: 2019-05-27

## 2019-05-27 LAB
ABO + RH BLD: ABNORMAL
ABO + RH BLD: ABNORMAL
ALBUMIN SERPL-MCNC: 2.9 G/DL (ref 3.4–5)
ALP SERPL-CCNC: 48 U/L (ref 40–150)
ALT SERPL W P-5'-P-CCNC: 15 U/L (ref 0–70)
ANION GAP SERPL CALCULATED.3IONS-SCNC: 6 MMOL/L (ref 3–14)
APTT PPP: 45 SEC (ref 22–37)
AST SERPL W P-5'-P-CCNC: 8 U/L (ref 0–45)
BILIRUB DIRECT SERPL-MCNC: <0.1 MG/DL (ref 0–0.2)
BILIRUB SERPL-MCNC: 0.4 MG/DL (ref 0.2–1.3)
BLD GP AB SCN SERPL QL: ABNORMAL
BLD PROD TYP BPU: ABNORMAL
BLD PROD TYP BPU: NORMAL
BLD UNIT ID BPU: 0
BLOOD BANK CMNT PATIENT-IMP: ABNORMAL
BLOOD BANK CMNT PATIENT-IMP: ABNORMAL
BLOOD PRODUCT CODE: NORMAL
BPU ID: NORMAL
BUN SERPL-MCNC: 13 MG/DL (ref 7–30)
CALCIUM SERPL-MCNC: 7.8 MG/DL (ref 8.5–10.1)
CHLORIDE SERPL-SCNC: 108 MMOL/L (ref 94–109)
CO2 SERPL-SCNC: 26 MMOL/L (ref 20–32)
CREAT SERPL-MCNC: 0.78 MG/DL (ref 0.66–1.25)
DIFFERENTIAL METHOD BLD: ABNORMAL
ERYTHROCYTE [DISTWIDTH] IN BLOOD BY AUTOMATED COUNT: 11.9 % (ref 10–15)
GFR SERPL CREATININE-BSD FRML MDRD: >90 ML/MIN/{1.73_M2}
GLUCOSE SERPL-MCNC: 87 MG/DL (ref 70–99)
HCT VFR BLD AUTO: 20.9 % (ref 40–53)
HGB BLD-MCNC: 6.9 G/DL (ref 13.3–17.7)
INR PPP: 1.07 (ref 0.86–1.14)
MAGNESIUM SERPL-MCNC: 2.2 MG/DL (ref 1.6–2.3)
MCH RBC QN AUTO: 32.4 PG (ref 26.5–33)
MCHC RBC AUTO-ENTMCNC: 33 G/DL (ref 31.5–36.5)
MCV RBC AUTO: 98 FL (ref 78–100)
NUM BPU REQUESTED: 1
PHOSPHATE SERPL-MCNC: 2.9 MG/DL (ref 2.5–4.5)
PLATELET # BLD AUTO: 26 10E9/L (ref 150–450)
POTASSIUM SERPL-SCNC: 3.8 MMOL/L (ref 3.4–5.3)
PROT SERPL-MCNC: 5.5 G/DL (ref 6.8–8.8)
RBC # BLD AUTO: 2.13 10E12/L (ref 4.4–5.9)
SODIUM SERPL-SCNC: 140 MMOL/L (ref 133–144)
SPECIMEN EXP DATE BLD: ABNORMAL
TRANSFUSION STATUS PATIENT QL: NORMAL
TRANSFUSION STATUS PATIENT QL: NORMAL
WBC # BLD AUTO: 0 10E9/L (ref 4–11)

## 2019-05-27 PROCEDURE — 20600000 ZZH R&B BMT

## 2019-05-27 PROCEDURE — 86923 COMPATIBILITY TEST ELECTRIC: CPT | Performed by: INTERNAL MEDICINE

## 2019-05-27 PROCEDURE — 80048 BASIC METABOLIC PNL TOTAL CA: CPT | Performed by: INTERNAL MEDICINE

## 2019-05-27 PROCEDURE — 86902 BLOOD TYPE ANTIGEN DONOR EA: CPT | Performed by: INTERNAL MEDICINE

## 2019-05-27 PROCEDURE — 80076 HEPATIC FUNCTION PANEL: CPT | Performed by: INTERNAL MEDICINE

## 2019-05-27 PROCEDURE — 83735 ASSAY OF MAGNESIUM: CPT | Performed by: INTERNAL MEDICINE

## 2019-05-27 PROCEDURE — 86900 BLOOD TYPING SEROLOGIC ABO: CPT | Performed by: INTERNAL MEDICINE

## 2019-05-27 PROCEDURE — P9040 RBC LEUKOREDUCED IRRADIATED: HCPCS | Performed by: INTERNAL MEDICINE

## 2019-05-27 PROCEDURE — 25000132 ZZH RX MED GY IP 250 OP 250 PS 637: Performed by: INTERNAL MEDICINE

## 2019-05-27 PROCEDURE — 25000128 H RX IP 250 OP 636: Performed by: NURSE PRACTITIONER

## 2019-05-27 PROCEDURE — 85027 COMPLETE CBC AUTOMATED: CPT

## 2019-05-27 PROCEDURE — 85730 THROMBOPLASTIN TIME PARTIAL: CPT | Performed by: INTERNAL MEDICINE

## 2019-05-27 PROCEDURE — 86901 BLOOD TYPING SEROLOGIC RH(D): CPT | Performed by: INTERNAL MEDICINE

## 2019-05-27 PROCEDURE — 25800030 ZZH RX IP 258 OP 636: Performed by: INTERNAL MEDICINE

## 2019-05-27 PROCEDURE — 25000128 H RX IP 250 OP 636: Performed by: INTERNAL MEDICINE

## 2019-05-27 PROCEDURE — 86850 RBC ANTIBODY SCREEN: CPT | Performed by: INTERNAL MEDICINE

## 2019-05-27 PROCEDURE — 84100 ASSAY OF PHOSPHORUS: CPT | Performed by: INTERNAL MEDICINE

## 2019-05-27 PROCEDURE — 85610 PROTHROMBIN TIME: CPT | Performed by: INTERNAL MEDICINE

## 2019-05-27 RX ORDER — POTASSIUM CL/LIDO/0.9 % NACL 10MEQ/0.1L
10 INTRAVENOUS SOLUTION, PIGGYBACK (ML) INTRAVENOUS
Status: DISCONTINUED | OUTPATIENT
Start: 2019-05-27 | End: 2019-05-29 | Stop reason: HOSPADM

## 2019-05-27 RX ORDER — POTASSIUM CHLORIDE 7.45 MG/ML
10 INJECTION INTRAVENOUS
Status: DISCONTINUED | OUTPATIENT
Start: 2019-05-27 | End: 2019-05-29 | Stop reason: HOSPADM

## 2019-05-27 RX ORDER — FLUCONAZOLE 200 MG/1
200 TABLET ORAL DAILY
Status: DISCONTINUED | OUTPATIENT
Start: 2019-05-27 | End: 2019-05-29 | Stop reason: HOSPADM

## 2019-05-27 RX ORDER — POTASSIUM CHLORIDE 1.5 G/1.58G
20-40 POWDER, FOR SOLUTION ORAL
Status: DISCONTINUED | OUTPATIENT
Start: 2019-05-27 | End: 2019-05-29 | Stop reason: HOSPADM

## 2019-05-27 RX ORDER — ACYCLOVIR 800 MG/1
800 TABLET ORAL 2 TIMES DAILY
Status: DISCONTINUED | OUTPATIENT
Start: 2019-05-27 | End: 2019-05-29 | Stop reason: HOSPADM

## 2019-05-27 RX ORDER — SODIUM CHLORIDE 9 MG/ML
INJECTION, SOLUTION INTRAVENOUS CONTINUOUS
Status: ACTIVE | OUTPATIENT
Start: 2019-05-27 | End: 2019-05-27

## 2019-05-27 RX ORDER — ACETAMINOPHEN 325 MG/1
325-650 TABLET ORAL EVERY 4 HOURS PRN
Status: DISCONTINUED | OUTPATIENT
Start: 2019-05-27 | End: 2019-05-27

## 2019-05-27 RX ORDER — MAGNESIUM SULFATE HEPTAHYDRATE 40 MG/ML
4 INJECTION, SOLUTION INTRAVENOUS EVERY 4 HOURS PRN
Status: DISCONTINUED | OUTPATIENT
Start: 2019-05-27 | End: 2019-05-29 | Stop reason: HOSPADM

## 2019-05-27 RX ORDER — PAROXETINE 20 MG/1
20 TABLET, FILM COATED ORAL EVERY MORNING
Status: DISCONTINUED | OUTPATIENT
Start: 2019-05-27 | End: 2019-05-29 | Stop reason: HOSPADM

## 2019-05-27 RX ORDER — LORAZEPAM 2 MG/ML
.5-1 INJECTION INTRAMUSCULAR EVERY 4 HOURS PRN
Status: DISCONTINUED | OUTPATIENT
Start: 2019-05-27 | End: 2019-05-27

## 2019-05-27 RX ORDER — LIDOCAINE 40 MG/G
CREAM TOPICAL
Status: DISCONTINUED | OUTPATIENT
Start: 2019-05-27 | End: 2019-05-29 | Stop reason: HOSPADM

## 2019-05-27 RX ORDER — ACETAMINOPHEN 325 MG/1
325-650 TABLET ORAL EVERY 4 HOURS PRN
Status: DISCONTINUED | OUTPATIENT
Start: 2019-05-27 | End: 2019-05-29 | Stop reason: HOSPADM

## 2019-05-27 RX ORDER — HEPARIN SODIUM,PORCINE 10 UNIT/ML
5-10 VIAL (ML) INTRAVENOUS
Status: DISCONTINUED | OUTPATIENT
Start: 2019-05-27 | End: 2019-05-29 | Stop reason: HOSPADM

## 2019-05-27 RX ORDER — POTASSIUM CHLORIDE 29.8 MG/ML
20 INJECTION INTRAVENOUS
Status: DISCONTINUED | OUTPATIENT
Start: 2019-05-27 | End: 2019-05-29 | Stop reason: HOSPADM

## 2019-05-27 RX ORDER — POTASSIUM CHLORIDE 750 MG/1
20-40 TABLET, EXTENDED RELEASE ORAL
Status: DISCONTINUED | OUTPATIENT
Start: 2019-05-27 | End: 2019-05-29 | Stop reason: HOSPADM

## 2019-05-27 RX ORDER — ACYCLOVIR 800 MG/1
800 TABLET ORAL
Status: DISCONTINUED | OUTPATIENT
Start: 2019-05-27 | End: 2019-05-27

## 2019-05-27 RX ORDER — LORAZEPAM 0.5 MG/1
.5-1 TABLET ORAL EVERY 4 HOURS PRN
Status: DISCONTINUED | OUTPATIENT
Start: 2019-05-27 | End: 2019-05-27

## 2019-05-27 RX ORDER — LORATADINE 10 MG/1
10 TABLET ORAL DAILY
Status: DISCONTINUED | OUTPATIENT
Start: 2019-05-27 | End: 2019-05-29 | Stop reason: HOSPADM

## 2019-05-27 RX ORDER — HEPARIN SODIUM,PORCINE 10 UNIT/ML
5-10 VIAL (ML) INTRAVENOUS EVERY 24 HOURS
Status: DISCONTINUED | OUTPATIENT
Start: 2019-05-27 | End: 2019-05-29 | Stop reason: HOSPADM

## 2019-05-27 RX ORDER — PROCHLORPERAZINE MALEATE 5 MG
5-10 TABLET ORAL EVERY 6 HOURS PRN
Status: DISCONTINUED | OUTPATIENT
Start: 2019-05-27 | End: 2019-05-27

## 2019-05-27 RX ORDER — PROCHLORPERAZINE MALEATE 5 MG
5-10 TABLET ORAL EVERY 6 HOURS PRN
Status: DISCONTINUED | OUTPATIENT
Start: 2019-05-27 | End: 2019-05-29 | Stop reason: HOSPADM

## 2019-05-27 RX ADMIN — ACYCLOVIR 800 MG: 800 TABLET ORAL at 07:58

## 2019-05-27 RX ADMIN — OMEPRAZOLE 20 MG: 20 CAPSULE, DELAYED RELEASE ORAL at 07:57

## 2019-05-27 RX ADMIN — Medication 400 MCG: at 20:08

## 2019-05-27 RX ADMIN — LORATADINE 10 MG: 10 TABLET ORAL at 07:57

## 2019-05-27 RX ADMIN — CEFEPIME 2 G: 2 INJECTION, POWDER, FOR SOLUTION INTRAVENOUS at 22:49

## 2019-05-27 RX ADMIN — CEFEPIME 2 G: 2 INJECTION, POWDER, FOR SOLUTION INTRAVENOUS at 14:08

## 2019-05-27 RX ADMIN — FLUCONAZOLE 200 MG: 200 TABLET ORAL at 07:58

## 2019-05-27 RX ADMIN — CEFEPIME 2 G: 2 INJECTION, POWDER, FOR SOLUTION INTRAVENOUS at 06:26

## 2019-05-27 RX ADMIN — SODIUM CHLORIDE: 9 INJECTION, SOLUTION INTRAVENOUS at 03:01

## 2019-05-27 RX ADMIN — ACYCLOVIR 800 MG: 800 TABLET ORAL at 20:08

## 2019-05-27 RX ADMIN — PAROXETINE HYDROCHLORIDE HEMIHYDRATE 20 MG: 20 TABLET, FILM COATED ORAL at 07:58

## 2019-05-27 ASSESSMENT — MIFFLIN-ST. JEOR
SCORE: 1608.69
SCORE: 1611.41

## 2019-05-27 ASSESSMENT — ACTIVITIES OF DAILY LIVING (ADL)
ADLS_ACUITY_SCORE: 11

## 2019-05-27 NOTE — ED NOTES
[facility] [place]   ED Nurse to Floor Handoff     Angel Yanez is a 58 year old male who speaks English and lives with a spouse,  in a home  They arrived in the ED by car from home    ED Chief Complaint: Fever    ED Dx;   Final diagnoses:   Neutropenic fever (H)         Needed?: No    Allergies:   Allergies   Allergen Reactions     Avalide Hives     Chloroxylenol Rash     Technicare solution     Lorazepam Hives     Other reaction(s): Hives       Moxifloxacin Hives   .  Past Medical Hx:   Past Medical History:   Diagnosis Date     GERD (gastroesophageal reflux disease)      H/O autologous stem cell transplant (H) 02/2005     Hyperlipidemia      Multiple myeloma (H) 2004     PONV (postoperative nausea and vomiting)      Psoriasis      Psoriatic arthritis (H)       Baseline Mental status: WDL  Current Mental Status changes: at basesline    Infection present or suspected this encounter: cultures pending  Sepsis suspected: No  Isolation type: No active isolations     Activity level - Baseline/Home:  Independent  Activity Level - Current:   Independent    Bariatric equipment needed?: No    In the ED these meds were given:   Medications   ceFEPIme (MAXIPIME) 1g vial to attach to  ml bag for ADULTS or NS 50 ml bag for PEDS (has no administration in time range)       Drips running?  No    Home pump  No    Current LDAs  CVC Double Lumen 05/16/19 Right Internal jugular (Active)   Number of days: 10       Rash 01/22/19 1300 anterior chest (Active)   Number of days: 124       Incision/Surgical Site 05/14/19 Bilateral Hip (Active)   Number of days: 12       Labs results:   Labs Ordered and Resulted from Time of ED Arrival Up to the Time of Departure from the ED   CBC WITH PLATELETS DIFFERENTIAL - Abnormal; Notable for the following components:       Result Value    WBC 0.0 (*)     RBC Count 2.39 (*)     Hemoglobin 7.7 (*)     Hematocrit 23.6 (*)     Platelet Count 37 (*)     All  "other components within normal limits   COMPREHENSIVE METABOLIC PANEL - Abnormal; Notable for the following components:    Glucose 101 (*)     Calcium 8.0 (*)     Albumin 3.3 (*)     Protein Total 6.0 (*)     All other components within normal limits   LACTIC ACID WHOLE BLOOD   UA MACROSCOPIC WITH REFLEX TO MICRO AND CULTURE   BLOOD CULTURE   BLOOD CULTURE       Imaging Studies:   Recent Results (from the past 24 hour(s))   XR Chest 2 Views    Narrative    Streaky left basilar atelectasis. No focal pneumonia.       Recent vital signs:   /86   Temp 99.4  F (37.4  C) (Oral)   Resp 18   Ht 1.753 m (5' 9\")   Wt 81.2 kg (179 lb)   SpO2 99%   BMI 26.43 kg/m      Awa Coma Scale Score: 15 (05/26/19 2112)       Cardiac Rhythm: Normal Sinus  Pt needs tele? No  Skin/wound Issues: None    Code Status: Full Code    Pain control: pt had none    Nausea control: pt had none    Abnormal labs/tests/findings requiring intervention: See Results Review    Family present during ED course? Yes   Family Comments/Social Situation comments: Wife with pt, will go home for the night    Tasks needing completion: None    Levon Hopkins, RN  [phone] [facility]      "

## 2019-05-27 NOTE — H&P
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    #. Neutropenic fever: First fever since his transplant. No localizing symptoms.  -Start cefepime  -Follow urine and blood cultures  -Chest x-ray clear    #.  BMT/MM:  - 5/17 day 0, transplant. Cell dose was only 0.639x10^6  - Continue GCSF  until ANC>2500 x2 consecutive days.  - Taking Claritin  -  Re-stage per protocol      #.  HEME: Keep Hgb>8 and plts>10K.      #. ID  - Proph with HD AC, Fluconazole, Hold Azithro while on cefepime     #.  GI: No NV.   - Diarrhea improved some sore throat,  C.Diff neg  imodium prn  - Ativan and compazine PRN N/V. - GI prophy- omeprazole.      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  Patient went to clinic this morning for platelet transfusion and went home and started having fever of 100. 5 in the afternoon.  He is been getting daily G SF but his counts have been consistently low.  Call the heme-onc team and patient was advised to go to the emergency room.  Patient denies cough shortness of breath diarrhea or abdominal pain  ED course: Patient was  started on cefepime found to be neutropenic and admitted for further work-up and management.         Past Medical History:     Past Medical History:   Diagnosis Date     GERD (gastroesophageal reflux disease)      H/O autologous stem cell transplant (H) 02/2005     Hyperlipidemia      Multiple myeloma (H) 2004     PONV (postoperative nausea and vomiting)      Psoriasis      Psoriatic arthritis (H)               Past Surgical History:     Past Surgical History:   Procedure Laterality Date     ARTHROPLASTY HIP       COLONOSCOPY       HERNIA REPAIR       IR CVC TUNNEL PLACEMENT > 5 YRS OF AGE  1/22/2019     IR CVC TUNNEL PLACEMENT > 5 YRS OF AGE  5/16/2019     IR CVC TUNNEL REMOVAL LEFT  2/20/2019     IR FOLLOW UP VISIT OUTPATIENT  1/24/2019     ORTHOPEDIC SURGERY       PROCURE BONE MARROW N/A 5/14/2019    Procedure: Bone Marrow [place];  Surgeon: Antwan Erickson MD;  Location: [facility] OR     TRANSPLANT                Social History:     Social History     Socioeconomic History     Marital status:      Spouse name: Not on file     Number of children: Not on file     Years of education: Not on file     Highest education level: Not on file   Occupational History     Not on file   Social Needs     Financial resource strain: Not on file     Food insecurity:     Worry: Not on file     Inability: Not on file     Transportation needs:     Medical: Not on file     Non-medical: Not on file   Tobacco Use     Smoking status: Former Smoker     Smokeless tobacco: Never Used   Substance and Sexual Activity     Alcohol use: Yes     Comment: occasionaly     Drug use: No     Sexual activity: Not on file   Lifestyle     Physical activity:     Days per week: Not on file     Minutes per session: Not on file     Stress: Not on file   Relationships     Social connections:     Talks on phone: Not on file     Gets together: Not on file     Attends Restorationism service: Not on file     Active member of club or organization: Not on  file     Attends meetings of clubs or organizations: Not on file     Relationship status: Not on file     Intimate partner violence:     Fear of current or ex partner: Not on file     Emotionally abused: Not on file     Physically abused: Not on file     Forced sexual activity: Not on file   Other Topics Concern     Parent/sibling w/ CABG, MI or angioplasty before 65F 55M? Not Asked   Social History Narrative     Not on file            Family History:     Family History   Problem Relation Age of Onset     Diabetes Mother             Immunizations:     Immunization History   Administered Date(s) Administered     Influenza (IIV3) PF 12/20/2013     Influenza Vaccine, 3 YRS +, IM (QUADRIVALENT W/PRESERVATIVES) 10/08/2015, 10/26/2016, 11/04/2017     TD (ADULT, 7+) 12/11/1996     TDAP Vaccine (Boostrix) 10/08/2015     Tetanus 12/11/1996              Allergies:      Allergies   Allergen Reactions     Avalide Hives     Chloroxylenol Rash     Technicare solution     Lorazepam Hives     Other reaction(s): Hives       Moxifloxacin Hives            Medications:       Current Facility-Administered Medications on File Prior to Encounter:  heparin 100 UNIT/ML injection 5 mL   heparin lock flush 10 UNIT/ML injection 5 mL     Current Outpatient Medications on File Prior to Encounter:  acyclovir (ZOVIRAX) 800 MG tablet Take 1 tablet (800 mg) by mouth 5 times daily   azithromycin (ZITHROMAX) 250 MG tablet Take 1 tablet (250 mg) by mouth daily   calcium carbonate (CALCIUM CARBONATE) 600 MG tablet Take 2 tablets by mouth daily    Cholecalciferol (VITAMIN D3) 2000 units CAPS Take 2,000 Units by mouth daily    fluconazole (DIFLUCAN) 200 MG tablet Take 1 tablet (200 mg) by mouth daily   loratadine (CLARITIN) 10 MG tablet Take 10 mg by mouth daily   omeprazole (PRILOSEC OTC) 20 MG tablet Take 20 mg by mouth daily    PARoxetine (PAXIL) 20 MG tablet Take 20 mg by mouth every morning   prochlorperazine (COMPAZINE) 5 MG tablet Take 1-2 tablets  "(5-10 mg) by mouth every 6 hours as needed for nausea or vomiting   [START ON 6/17/2019] sulfamethoxazole-trimethoprim (BACTRIM DS/SEPTRA DS) 800-160 MG tablet Take 1 tablet by mouth Every Mon, Tues two times daily   acetaminophen (TYLENOL) 325 MG tablet Take 1-2 tablets (325-650 mg) by mouth every 4 hours as needed for mild pain   calcipotriene (DOVONOX) 0.005 % SOLN solution Apply topically 2 times daily as needed             Review of Systems:     A complete ROS was performed and is negative other than what is stated in the HPI.          Physical Exam:   Blood pressure 141/86, pulse 97, temperature 99.4  F (37.4  C), temperature source Oral, resp. rate 18, height 1.753 m (5' 9\"), weight 81.2 kg (179 lb), SpO2 96 %.  General Appearance: Pleasant not in distress   HEENT: No jaundice, moist BM   Respiratory: CTAB  Cardiovascular: RRR, s1, s2 no m/g   GI: Soft NABS  Genitourinary: No CVAT   Extremities : No Edema   Neurologic: A&Ox3, moves all extremities equally          Data:   Reviewed in Epic     "

## 2019-05-27 NOTE — ED TRIAGE NOTES
Pt tmax 100.52 at home. On arrival temp was 99.4. Mild cough d/t mouth and throat sores. BMT +9 days. Pt received plt in clinic today.

## 2019-05-27 NOTE — PLAN OF CARE
Pt had a quiet day with stable vital signs.    1 unit RBCs given.    Problem: Adult Inpatient Plan of Care  Goal: Plan of Care Review  5/27/2019 1806 by Monse Nunez, RN    Outcome: No Change  Flowsheets (Taken 5/27/2019 0533)  Plan of Care Reviewed With: patient  Progress: no change

## 2019-05-27 NOTE — PROGRESS NOTES
[place] [facility]  I asked by the bedside nurse to change frequency of patient's Acyclovir for 5 times per day to BID. I requested approval from pharmacy as I was not part of that discussion during multidisciplinary rounds.  Per my personal discussion with pharmacy, this change is dosing would be okay. I asked pharmacy to document our discussion as well    Rinku Lewis MD, [facility], [place]  Pager: [phone]  5/27/2019

## 2019-05-27 NOTE — PROGRESS NOTES
Patient admitted to: 5C  Admitted from: ED  Arrived by: Stretcher  Reason for admission: Neutropenic fever  Patient accompanied by: Self  Belongings: Kept with patient at bedside.  Teaching: Unit routine, call light usage, hand hygiene.

## 2019-05-27 NOTE — PLAN OF CARE
[id number]  Afebrile. VSS. Denies P/N/V. Does have some throat discomfort with swallowing. NS infusing @ 125 mL/hr. Will need RBCs for a Hgb of 6.9 this morning, but still needs a consent. Continue to monitor and follow POC.   Problem: Adult Inpatient Plan of Care  Goal: Plan of Care Review  Outcome: No Change  Flowsheets (Taken 5/27/2019 3633)  Plan of Care Reviewed With: patient  Progress: no change     Problem: Adult Inpatient Plan of Care  Goal: Optimal Comfort and Wellbeing  Outcome: No Change     Problem: Fever (Fever with Neutropenia)  Goal: Baseline Body Temperature  Outcome: No Change     Problem: Infection Risk (Fever with Neutropenia)  Goal: Absence of Infection  Outcome: No Change

## 2019-05-27 NOTE — PROGRESS NOTES
"BMT progress  Note       Patient ID:  Angel Yanez is a 58 year old male, day + 10 s/p 2nd auto PBSCT for IgG Kappa MM      Diagnosis MM Multiple myeloma  HCT Type Autologous    Prep Regimen Cytoxan  Melphalan   Donor Source No data was found    GVHD Prophylaxis No  Primary BMT Provider [facility]         INTERVAL  HISTORY     Guille was admitted through the ER last night for neutropenic fever.  T max at home was 100.5 & he has been afebrile since admission.  No cough or congestion.  Developing a sore throat but still able to eat.  No N/V.  Stools are soft.  No pain or bleeding    Review of Systems: 10 point ROS negative except as noted above.     /73   Pulse 88   Temp 99.1  F (37.3  C) (Axillary)   Resp 18   Ht 1.753 m (5' 9\")   Wt 81.1 kg (178 lb 12.8 oz)   SpO2 95%   BMI 26.40 kg/m       General: NAD, interactive, appropriate    Eyes: : FARRAH, sclera anicteric   Nose/Mouth/Throat: OP clear, buccal mucosa ridged, no ulcerations.    Lungs: CTA bilaterally  Cardiovascular: RRR, no M/R/G   Abdominal/Rectal: +BS, soft, NT, ND, No HSM   Skin: no rashes or petechaie  Neuro: A&O   Access: Hsieh right side chest wall, dressing intact     Labs  Lab Results   Component Value Date    WBC 0.0 (LL) 05/27/2019    ANEU 1.3 (L) 05/23/2019    HGB 6.9 (LL) 05/27/2019    HCT 20.9 (L) 05/27/2019    PLT 26 (LL) 05/27/2019     05/27/2019    POTASSIUM 3.8 05/27/2019    CHLORIDE 108 05/27/2019    CO2 26 05/27/2019    GLC 87 05/27/2019    BUN 13 05/27/2019    CR 0.78 05/27/2019    MAG 2.2 05/27/2019    INR 1.07 05/27/2019    BILITOTAL 0.4 05/27/2019    AST 8 05/27/2019    ALT 15 05/27/2019    ALKPHOS 48 05/27/2019    PROTTOTAL 5.5 (L) 05/27/2019    ALBUMIN 2.9 (L) 05/27/2019       OVERALL PLAN   Angel Yanez is a 58 year old male, day + 10 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  BMT/MM: Received Melphalan D-1. Allopurinol complete .   - 5/17 day 0, transplant. Cell dose was only 0.639x10^6. ([facility] [place] - " known poor cell dose as failed chemo-mobilization 1/2019.)   - Continue GCSF  until ANC>2500 x2 consecutive days.  - Taking Claritin  -  Re-stage per protocol      2.  HEME: Keep Hgb>8 and plts>10K.   - Pt has RBC antibodies.  Antibody work up sent today.  Needs RBC's  - No pre-meds.    3.  ID: Febrile to 100.5.  Bld cx pending.  CXR clear.  - Started Cefipime.   - proph with HD AC, Fluconazole   -PVP proph day +28     4.  GI:    - Diarrhea improved. C.Diff neg  imodium prn  - Ativan and compazine PRN N/V.  Not needing yet  - GI prophy- omeprazole.      5.  FEN/Renal: Lytes & creat stable.   - Monitor creat and lytes. Replete lytes PRN per SS. Monitor weight, I+O     6.  Mood:  Cont Paxil    Karla Manzo    ATTENDING ADDENDUM:    I have independently seen and evaluated the patient on May 27, 2019 and reviewed clinical, laboratory, and radiographic findings. I have discussed the plan with the team and agree with the attached note with the following edits:    Angel Yanez is a 58 year old year old male, day +10 of auto, here for NF, feels well.    Ph/E: Vitals reviewed. No distress. Oropharynx clear. Neck supple. Heart RRR. Lungs clear. Abdomen soft. No peripheral edema. No rash. Neuro nonfocal.     A&P: Cefe and fluids. Cultures.       acyclovir  800 mg Oral 5x Daily     ceFEPIme (MAXIPIME) IV  2 g Intravenous Q8H     filgrastim (NEUPOGEN/GRANIX) intravenous  5 mcg/kg Intravenous Daily at 8 pm     fluconazole  200 mg Oral Daily     loratadine  10 mg Oral Daily     omeprazole  20 mg Oral Daily     PARoxetine  20 mg Oral QAM       Kody Mukherjee

## 2019-05-28 ENCOUNTER — HOME INFUSION (PRE-WILLOW HOME INFUSION) (OUTPATIENT)
Dept: PHARMACY | Facility: CLINIC | Age: 59
End: 2019-05-28

## 2019-05-28 ENCOUNTER — CARE COORDINATION (OUTPATIENT)
Dept: ONCOLOGY | Facility: CLINIC | Age: 59
End: 2019-05-28

## 2019-05-28 VITALS
DIASTOLIC BLOOD PRESSURE: 79 MMHG | WEIGHT: 178.8 LBS | HEART RATE: 97 BPM | BODY MASS INDEX: 26.48 KG/M2 | OXYGEN SATURATION: 96 % | SYSTOLIC BLOOD PRESSURE: 124 MMHG | HEIGHT: 69 IN | RESPIRATION RATE: 16 BRPM | TEMPERATURE: 100 F

## 2019-05-28 LAB
ABO + RH BLD: ABNORMAL
ABO + RH BLD: ABNORMAL
ANION GAP SERPL CALCULATED.3IONS-SCNC: 6 MMOL/L (ref 3–14)
BLD GP AB SCN SERPL QL: ABNORMAL
BLD PROD TYP BPU: ABNORMAL
BLD PROD TYP BPU: NORMAL
BLD UNIT ID BPU: 0
BLOOD BANK CMNT PATIENT-IMP: ABNORMAL
BLOOD BANK CMNT PATIENT-IMP: ABNORMAL
BLOOD PRODUCT CODE: NORMAL
BPU ID: NORMAL
BUN SERPL-MCNC: 11 MG/DL (ref 7–30)
CALCIUM SERPL-MCNC: 7.9 MG/DL (ref 8.5–10.1)
CHLORIDE SERPL-SCNC: 109 MMOL/L (ref 94–109)
CO2 SERPL-SCNC: 26 MMOL/L (ref 20–32)
CREAT SERPL-MCNC: 0.76 MG/DL (ref 0.66–1.25)
DIFFERENTIAL METHOD BLD: ABNORMAL
ERYTHROCYTE [DISTWIDTH] IN BLOOD BY AUTOMATED COUNT: 13.1 % (ref 10–15)
GFR SERPL CREATININE-BSD FRML MDRD: >90 ML/MIN/{1.73_M2}
GLUCOSE SERPL-MCNC: 92 MG/DL (ref 70–99)
HCT VFR BLD AUTO: 22 % (ref 40–53)
HGB BLD-MCNC: 7.5 G/DL (ref 13.3–17.7)
LACTATE BLD-SCNC: 0.6 MMOL/L (ref 0.7–2)
MCH RBC QN AUTO: 32.3 PG (ref 26.5–33)
MCHC RBC AUTO-ENTMCNC: 34.1 G/DL (ref 31.5–36.5)
MCV RBC AUTO: 95 FL (ref 78–100)
NUM BPU REQUESTED: 3
PLATELET # BLD AUTO: 19 10E9/L (ref 150–450)
POTASSIUM SERPL-SCNC: 3.9 MMOL/L (ref 3.4–5.3)
RBC # BLD AUTO: 2.32 10E12/L (ref 4.4–5.9)
SODIUM SERPL-SCNC: 140 MMOL/L (ref 133–144)
SPECIMEN EXP DATE BLD: ABNORMAL
TRANSFUSION STATUS PATIENT QL: NORMAL
TRANSFUSION STATUS PATIENT QL: NORMAL
WBC # BLD AUTO: 0.1 10E9/L (ref 4–11)

## 2019-05-28 PROCEDURE — 80048 BASIC METABOLIC PNL TOTAL CA: CPT | Performed by: INTERNAL MEDICINE

## 2019-05-28 PROCEDURE — 25000132 ZZH RX MED GY IP 250 OP 250 PS 637: Performed by: INTERNAL MEDICINE

## 2019-05-28 PROCEDURE — 25000128 H RX IP 250 OP 636: Performed by: INTERNAL MEDICINE

## 2019-05-28 PROCEDURE — 20600000 ZZH R&B BMT

## 2019-05-28 PROCEDURE — P9040 RBC LEUKOREDUCED IRRADIATED: HCPCS | Performed by: INTERNAL MEDICINE

## 2019-05-28 PROCEDURE — 85027 COMPLETE CBC AUTOMATED: CPT

## 2019-05-28 PROCEDURE — 25000128 H RX IP 250 OP 636: Performed by: NURSE PRACTITIONER

## 2019-05-28 PROCEDURE — 25000128 H RX IP 250 OP 636: Performed by: PHYSICIAN ASSISTANT

## 2019-05-28 PROCEDURE — 83605 ASSAY OF LACTIC ACID: CPT | Performed by: PHYSICIAN ASSISTANT

## 2019-05-28 RX ORDER — SULFAMETHOXAZOLE/TRIMETHOPRIM 800-160 MG
1 TABLET ORAL
Qty: 16 TABLET | Refills: 11 | Status: ON HOLD | COMMUNITY
Start: 2019-06-17 | End: 2019-07-25

## 2019-05-28 RX ORDER — ACETAMINOPHEN 325 MG/1
650 TABLET ORAL EVERY 4 HOURS PRN
Status: CANCELLED
Start: 2019-05-28

## 2019-05-28 RX ORDER — CEFTRIAXONE 2 G/1
2 INJECTION, POWDER, FOR SOLUTION INTRAMUSCULAR; INTRAVENOUS EVERY 24 HOURS
Status: ON HOLD | COMMUNITY
Start: 2019-05-28 | End: 2019-06-05

## 2019-05-28 RX ORDER — ACYCLOVIR 800 MG/1
800 TABLET ORAL 2 TIMES DAILY
COMMUNITY
Start: 2019-05-28 | End: 2019-12-06

## 2019-05-28 RX ORDER — DIPHENHYDRAMINE HCL 25 MG
25 CAPSULE ORAL EVERY 6 HOURS PRN
Status: CANCELLED
Start: 2019-05-28

## 2019-05-28 RX ORDER — CEFTRIAXONE 2 G/1
2 INJECTION, POWDER, FOR SOLUTION INTRAMUSCULAR; INTRAVENOUS EVERY 24 HOURS
Status: CANCELLED
Start: 2019-05-28

## 2019-05-28 RX ORDER — CEFTRIAXONE 2 G/1
2 INJECTION, POWDER, FOR SOLUTION INTRAMUSCULAR; INTRAVENOUS EVERY 24 HOURS
Status: DISCONTINUED | OUTPATIENT
Start: 2019-05-28 | End: 2019-05-29 | Stop reason: HOSPADM

## 2019-05-28 RX ADMIN — FLUCONAZOLE 200 MG: 200 TABLET ORAL at 08:00

## 2019-05-28 RX ADMIN — OMEPRAZOLE 20 MG: 20 CAPSULE, DELAYED RELEASE ORAL at 08:00

## 2019-05-28 RX ADMIN — CEFEPIME 2 G: 2 INJECTION, POWDER, FOR SOLUTION INTRAVENOUS at 06:44

## 2019-05-28 RX ADMIN — CEFTRIAXONE SODIUM 2 G: 2 INJECTION, POWDER, FOR SOLUTION INTRAMUSCULAR; INTRAVENOUS at 12:57

## 2019-05-28 RX ADMIN — Medication 5 ML: at 08:07

## 2019-05-28 RX ADMIN — Medication 10 ML: at 13:56

## 2019-05-28 RX ADMIN — ACYCLOVIR 800 MG: 800 TABLET ORAL at 08:00

## 2019-05-28 RX ADMIN — Medication 400 MCG: at 11:21

## 2019-05-28 RX ADMIN — PAROXETINE HYDROCHLORIDE HEMIHYDRATE 20 MG: 20 TABLET, FILM COATED ORAL at 08:01

## 2019-05-28 RX ADMIN — LORATADINE 10 MG: 10 TABLET ORAL at 08:00

## 2019-05-28 ASSESSMENT — ACTIVITIES OF DAILY LIVING (ADL)
ADLS_ACUITY_SCORE: 11

## 2019-05-28 ASSESSMENT — MIFFLIN-ST. JEOR: SCORE: 1611.41

## 2019-05-28 NOTE — PROGRESS NOTES
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  T max at home was 100.5 & he has been afebrile since admission.  No cough or congestion.  Developing a sore throat but still able to eat.  No N/V.  Stools are soft.  No pain or bleeding    Review of Systems: 10 point ROS negative except as noted above.     /71 (BP Location: Right arm)   Pulse 88   Temp 98.7  F (37.1  C) (Oral)   Resp 18   Ht 1.753 m (5' 9\")   Wt 80.8 kg (178 lb 3.2 oz)   SpO2 96%   BMI 26.32 kg/m       General: NAD, interactive, appropriate    Eyes: : FARRAH, sclera anicteric   Nose/Mouth/Throat: OP clear, buccal mucosa ridged, no ulcerations.    Lungs: CTA bilaterally  Cardiovascular: RRR, no M/R/G   Abdominal/Rectal: +BS, soft, NT, ND, No HSM   Skin: no rashes or petechaie  Neuro: A&O   Access: Hsieh right side chest wall, dressing intact     Labs  Lab Results   Component Value Date    WBC 0.1 (LL) 05/28/2019    ANEU 1.3 (L) 05/23/2019    HGB 7.5 (L) 05/28/2019    HCT 22.0 (L) 05/28/2019    PLT 19 (LL) 05/28/2019     05/28/2019    POTASSIUM 3.9 05/28/2019    CHLORIDE 109 05/28/2019    CO2 26 05/28/2019    GLC 92 05/28/2019    BUN 11 05/28/2019    CR 0.76 05/28/2019    MAG 2.2 05/27/2019    INR 1.07 05/27/2019    BILITOTAL 0.4 05/27/2019    AST 8 05/27/2019    ALT 15 05/27/2019    ALKPHOS 48 05/27/2019    PROTTOTAL 5.5 (L) 05/27/2019    ALBUMIN 2.9 (L) 05/27/2019       OVERALL PLAN   Angel C Renata is a 58 year old male, day + 10 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  BMT/MM: Received Melphalan D-1. Allopurinol complete .   - 5/17 day 0, transplant. Cell dose was only " 0.639x10^6. ([facility] - known poor cell dose as failed chemo-mobilization 1/2019.)   - Continue GCSF  until ANC>2500 x2 consecutive days.  - Taking Claritin  -  Re-stage per protocol      2.  HEME: Keep Hgb>8 and plts>10K.   - Pt has RBC antibodies.  Antibody work up sent today.  Needs RBC's  - No pre-meds.    3.  ID: Febrile to 100.5.  Bld cx pending.  CXR clear.  - Started Cefipime.   - proph with HD AC, Fluconazole   -PVP proph day +28     4.  GI:    - Diarrhea improved. C.Diff neg  imodium prn  - Ativan and compazine PRN N/V.  Not needing yet  - GI prophy- omeprazole.      5.  FEN/Renal: Lytes & creat stable.   - Monitor creat and lytes. Replete lytes PRN per SS. Monitor weight, I+O     6.  Mood:  Cont Paxil    Karla Manzo    ATTENDING ADDENDUM:    I have independently seen and evaluated the patient on May 27, 2019 and reviewed clinical, laboratory, and radiographic findings. I have discussed the plan with the team and agree with the attached note with the following edits:    Angel Yanez is a 58 year old year old male, day +10 of auto, here for NF, feels well.    Ph/E: Vitals reviewed. No distress. Oropharynx clear. Neck supple. Heart RRR. Lungs clear. Abdomen soft. No peripheral edema. No rash. Neuro nonfocal.     A&P: Cefe and fluids. Cultures.       acyclovir  800 mg Oral BID     ceFEPIme (MAXIPIME) IV  2 g Intravenous Q8H     filgrastim (NEUPOGEN/GRANIX) intravenous  5 mcg/kg Intravenous Daily at 8 pm     fluconazole  200 mg Oral Daily     heparin lock flush  5-10 mL Intracatheter Q24H     loratadine  10 mg Oral Daily     omeprazole  20 mg Oral Daily     PARoxetine  20 mg Oral MENDEZM       Kody Mukherjee

## 2019-05-28 NOTE — PROGRESS NOTES
Care Coordination - Discharge Note     Line Company:  [employer] [facility] [phone] for line care supplies    Referral made for line care:  Yes  IV medications needed at discharge:  None   Pharmacy concerns:  None. (of note, vori requires prior auth)    PT/OT recommended:  No  Referral made for PT/OT:  NA    D/c location:  [facility]  Has placement need been communicated to Social Work?  NA    Teaching time arranged with family:  Completed 5/16  Notify nurse to schedule line care class/ DM teaching, prior to d/c:  No, Patient and caregiver previously received teaching, and decline further need     Medicare form required:  No    Caregiver:  Jennifer Yanez (wife) [phone]  Dalton Yanez (son) [phone]    Outpatient Nurse Coordinator notified of patient discharge.

## 2019-05-28 NOTE — PLAN OF CARE
Patient tmax 99.7, other VSS. Eating good. Drinking well. Up independent. Denies n/v. No diarrhea. No pain. Discharge home today. discharge paper and mediactions reviewed with patient. Awaiting for ride.

## 2019-05-28 NOTE — SUMMARY OF CARE
BMT Hospital Discharge Summary of Care  Treatment Team:  Patient Care Team:  Ayana Love PA-C as PCP - General (Physician Assistant)  Adebayo Lucio MD as BMT Physician (Transplant)  Su Pate, RN as BMT Nurse Coordinator (Transplant)  Aliya Collado as Referring Physician (Internal Medicine)  Brooke Lnusford as Registered Nurse (Oncology)  Christie Obrien MSW as   Janine Loco LICSW as  ( - Clinical)  Kody Mukherjee MD as MD (Hematology & Oncology)  Discharge Diagnosis: S/P readmission for neutropenic fevers    Hospital Discharge on 05/28/19  Discharge Location Home   Activity at Discharge Ad mickey   Nutrition Regular diet as tolerated     Blood Transfusion Parameters Transfuse if Hemoglobin < or equal 7 mg/dL  Red Blood Cell Order: 2 units, irradiated and leukoreduced   Transfuse if Platelet count < or equal 10,000 uL  Platelet order: 1 adult dose, irradiated and leukoreduced  Transfusion Pre-meds:  None     Blood Counts Recent Labs   Lab Test 05/28/19  0324  05/23/19  1321   WBC 0.1*   < > 1.3*   ANEU  --   --  1.3*   ALYM  --   --  0.0*   NEGAR  --   --  0.0   AEOS  --   --  0.0   HGB 7.5*   < > 9.8*   HCT 22.0*   < > 29.3*   PLT 19*   < > 38*    < > = values in this interval not displayed.      IV Medications Outpatient Pharmacy G-CSF to be given in clinic: (dose) 480 mcg subcutaneous daily     Electrolyte Replacement  Parameters in Clinic Intravenous Electrolyte Replacement:    Potassium Chloride  Give only if serum creatinine <2. Reference range 3.4-5.3 mmol/L        3.0 - 3.3 mmol/L Oral: 40 mEq by mouth x 1  PIV/CVC on TPN: 30 mEq over 1 hour x 1 doses & 20 mEq by mouth  CVC NOT on TPN: 40 mEq IV x 1   2.5-2.9 mmol/L  Oral: 40 mEq by mouth every 2 hrs x 2  PIV/CVC on TPN: 30 mEq IV & 40 mEq by mouth   CVC NOT on TPN: 60 mEq IV        <2.5 PIV/CVC on TPN: 30 mEq IV & 40 mEq by mouth   CVC NOT on TPN: 60 mEq IV      Magnesium Sulfate  Reference  range 1.6-2.3 mg/dl       1.2-1.5 mg/dl  Oral:400 mg by mouth twice daily x 2 days   IV: 2 gm over 1 hour        0.9-1.1 IV Only: 4 gm IV over 2 hours   < 0.9  Discretion of provider and pharmacist        Home Infusion  IV Medications through home infusion: None   Line Care Care: Hsieh - Flush each line with 5mL of 10 unit/mL heparin every 24 hours  Supplies Through: [facility]  Fax: [phone]  Ph: [phone]      Physical Therapy & Support Services None     Laboratory Tests at Next Clinic Visit Hemogram (CBC) differential, platelet count  Basic Metabolic Panel     Healthy Lifestyle Follow general guidelines for physical activity as recommended by the Office of Disease Prevention & Health Promotion (2017) www.health.gov/paguidelines:    Avoid Inactivity  Some physical activity is better than none -- and any amount has health benefits.    Do Aerobic Activity  For substantial health benefits, work up to:    150 minutes (2 hours and 30 minutes) each week of moderate-intensity aerobic physical activity (such as brisk walking or tennis)    Do aerobic physical activity in episodes of at least 10 minutes and, if possible, spread it out through the week.  For even greater health benefits, do one of the following:    Doing more will lead to even greater health benefits.    Strengthen Muscles  Do muscle-strengthening activities that involve all major muscle groups on 2 or more days a week.    Healthy Lifestyle    Eat a healthy diet with a wide variety of foods    Don t smoke (passive or active exposure), chew tobacco, or use illegal drugs.     Discuss drinking alcohol with your provider. If cleared to use alcohol, do so in moderation, generally less than two drinks per day.     Maintain a healthy weight    Avoid excessive sun exposure and wear sunscreen protection for anticipated periods of long exposure.    Information above directly cited from these sources:  1. CONCEPCIÓN Reyes., YONAS Turner, SHUKRI Steel,  ALISSA Mnotez, RONY Pérez, RONY Estrada.,   Lorie KCaryl DENIS. (2013). Prevalence of Hematopoietic Cell Transplant Survivors in the [place]. Biology of Blood and Marrow Transplantation?: Journal of the American Society for Blood and Marrow Transplantation, 19(10), 8432-3614. doi   10.1016/j.bbmt.2013.07.020  2. Office of Disease Prevention and Health Promotion. (2017). Physical activity   guidelines. Retrieved from https://health.gov/paguidelTuneStars/.     When to Call  (Refer to Discharge Teaching Materials)   Fever > 100.5 F    Bleeding that does not stop after a few minutes    Severe nausea, vomiting, or diarrhea     New fall or injury    Change in designated caregiver    Medication question    Any other urgent concern   Follow Up Visits BMT Clinic Appointment Date/Time:   BMT Clinic (date, time, provider): 5/29/2019 check in at 10:15am for labs; you will see a BMT provider and get your IV antibiotic as well.        Hospital Discharge Medications:   Guille Yanez   Home Medication Instructions [id number]    Printed on:05/28/19 4662   Medication Information                      acyclovir (ZOVIRAX) 800 MG tablet  Take 1 tablet (800 mg) by mouth 2 times daily             calcipotriene (DOVONOX) 0.005 % SOLN solution  Apply topically 2 times daily as needed              calcium carbonate (CALCIUM CARBONATE) 600 MG tablet  Take 2 tablets by mouth daily              cefTRIAXone (ROCEPHIN) 2 GM vial  Inject 2 g into the vein every 24 hours To be administered in the BMT clinic             Cholecalciferol (VITAMIN D3) 2000 units CAPS  Take 2,000 Units by mouth daily              filgrastim 15 mcg/mL, in Dextrose, (NEUPOGEN) 15 mcg/ml  To be administered in the BMT clinic.             fluconazole (DIFLUCAN) 200 MG tablet  Take 1 tablet (200 mg) by mouth daily             loratadine (CLARITIN) 10 MG tablet  Take 10 mg by mouth daily             omeprazole (PRILOSEC OTC) 20 MG tablet  Take 20 mg by mouth daily               PARoxetine (PAXIL) 20 MG tablet  Take 20 mg by mouth every morning             prochlorperazine (COMPAZINE) 5 MG tablet  Take 1-2 tablets (5-10 mg) by mouth every 6 hours as needed for nausea or vomiting             sulfamethoxazole-trimethoprim (BACTRIM DS/SEPTRA DS) 800-160 MG tablet  Take 1 tablet by mouth Every Mon, Tues two times daily DO NOT START UNTIL DIRECTED BY BMT STAFF                    Latrice Pride

## 2019-05-28 NOTE — PLAN OF CARE
[id number].   Pt AVSS, denies any pain.  No N/V/D.   Continues to have sore throat but refused any interventions.   No replacements needed this AM.   Continue to monitor.     Problem: Adult Inpatient Plan of Care  Goal: Plan of Care Review  5/28/2019 0612 by John Khan RN  Outcome: No Change     Problem: Fever (Fever with Neutropenia)  Goal: Baseline Body Temperature  Outcome: No Change     Problem: Infection Risk (Fever with Neutropenia)  Goal: Absence of Infection  Outcome: No Change

## 2019-05-28 NOTE — PROGRESS NOTES
Psychosocial Assessment completed in the BMT Clinic and coped here for staff review.    Blood and Marrow Transplant   Psychosocial Assessment with   Clinical      REPEAT Assessment completed on 5/6/2019 of living situation, support system, financial status, functional status, coping, stressors, need for resources and social work intervention provided as needed. Information for this assessment was provided by pt's report in addition to medical chart review and consultation with medical team.      Present at assessment:   Patient: Angel Yanez  : ZUHAIR Woodard, JANELL     Diagnosis: Multiple Myeloma     Transplant type: Autologous     Donor: Autologous      Physician: Adebayo Lucio MD     Nurse Coordinator: Su Pate RN     Permanent Address:   [address] [place] [id number]     Contact Information:  Pt's Home Phone: [phone]  Pt's Cell Phone: [phone]  Pt Email: [email or web address]  Wife-Jennifer Yanez's Phone: [phone]  Son-Dalton Yanez's Phone: [phone]     Presenting Information:  Zaheer is a 58 year old male diagnosed with multiple myeloma who presents for evaluation for autologous transplant at the [facility] This will be pt's 2nd autologous transplant at the [facility].     Decision Making: Self     Health Care Directive: Yes. Pt has completed the healthcare directive completed and on file.      Relationship Status: . Pt described relationship as stable and supportive.      Special Needs: None identified at this time.      Family/Support System: Pt endorsed a good support system including family and close friends who will be available to support pt throughout transplant process.      Spouse: Jennifer Yanez  Children: 3 Children; Son-Alivia Yanez (28-Lives in [place], [place]), Son-Magdiel Yanez (Lives in [place], [place]) and Daughter-Jerry Yanez (Lives in [place])  Grandchildren: 1 Grandson-Alivia Tyler (1.5 years  "old)  Parents:   Siblings: 1 Sister-Sofie Calvin (Lives in [place], [place])     Caregiver: JANELL discussed with pt the caregiver role and expectation at length. During last PSA on 1/15/2019, pt said he won t have a caregiver 24/7 but will tell me he does if that is required and pt said SW can put his sonLucille down as well  if Social Work needed another name on the caregiver contract . During the PSA today (2019), pt said \"oh yea I will have a caregiver\". Pt's wife runs a small  out of the home, so she is there during the day. SW reminded pt of the last conversation and SW repeatedly reminded pt he is required to have a caregiver 24/7 for 30 days. Pt signed the caregiver contract which will be scanned into the EMR. Pt said his wife-Jennifer and son-Dalton will be the caregivers. Caregiver education and resources provided. EULALIO Delgadillo, and Andrew are aware. Dr. Lucio said he would reinforce the caregiver requirement during the close appointment.       Caregiver Contact Information:  Wife-Jennifer Yanez's Phone: [phone]  SonKiera Yanez's Phone: [phone]     Transportation Mode: Private Vehicle. During last PSA on 1/15/2019, pt said he will be driving himself to clinic post-transplant. JANELL thoroughly explained to pt that he cannot drive post-transplant until approved by a Physician. EULALIO Delgadillo, and Andrew were notified. Dr. Lucio said he would reinforce the \"no driving\" during the close appointment as well.      Insurance: Pt has [employer] health insurance; pt has an . Pt denied specific insurance concerns at this time. JANELL reiterated information about the [employer]  should specific insurance questions arise as pt moves through transplant process. Future Insurance questions referred to [employer] -Jessica Downs (P: [phone]).      Sources of Income: Pt's income, pt went back to work, and his " "wife's income. Once pt completely stops working, pt will re-file for short term disability. Pt also has long term disability available. Pt denied anticipation of financial hardship related to BMT at this time. SW provided information on gya options and encouraged pt to contact this SW for support should financial situation change.      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 Pt's wife-Jennifer owns her own  at home.      Mental Health: Pt denied a history of mental health concerns, specific diagnoses or medications at this time. SW explained that it's not uncommon for patients going through transplant to experience symptoms of depression/anxiety.      PHQ-9:  Pt scored a 4 which indicates no sign of depression on the depression severity scale. Pt does not endorse feelings  of depression at this time.      GAD7:  Pt scored a 3 which indicates no sign of anxiety on the Generalized Anxiety Disorder Questionnaire. Pt endorses this is an accurate reflection of his emotional state.     Chemical Use: Pt reported that he quit smoking 35 years ago. Pt reported minimal alcohol consumption; 2 beers in the past 3 months. Pt reported no hx of marijuana or other drugs. Based on the information provided, there appear to be no specific risks or concerns identified at this time.      Trauma/Loss/Abuse History: Multiple losses associated with cancer diagnosis and treatment, including health, employment, changes to physical appearance, etc.      Spirituality: Pt would not like a blessing ceremony while inpatient. SW explained that there are Chaplains on the unit and pt can request to meet with a  at anytime.     Coping: Pt noted that he is currently feeling \"frustrated and ready to begin\". Pt shared that his main coping mechanisms are exercise (riding bike). SW and pt discussed additional positive coping mechanisms that pt can utilize while in the hospital. While hospitalized, pt plans to walk the " hallways and watch tv.      Education Provided: Transplant process expectations, Caregiver requirements, Caregiver self-care, Financial issues related to transplant, Financial resources/grants available, Common psychosocial stressors pre/post transplant, Support group(s) available, Hospital resources available, Social Work role and Resources for grandchild.     Interventions Provided: Psychosocial Support and Education      Assessment and Recommendations for Team:  Pt is a 58 year old male diagnosed with multiple myeloma who is here undergoing preparation for a planned autologous transplant. Pt appeared to be in good spirits during conversation. Pt is pleasant and able to articulate concerns/coping mechanisms in an appropriate manner. Pt feels comfortable communicating with the medical team. Pt has a good supportive network of family and friends who are involved. Pt has developed a few coping mechanisms such as exercising and working on his hobbies. Pt may benefit from assistance in developing coping mechanisms. Pt and pt's family will benefit from ongoing psychosocial support in regards to coping with the adjustment to the BMT process.      Pt has a good support system. Please see caregiver section for update. Pt verbalizes understanding of the transplant process and wanting to proceed. SW provided contact information and encouraged pt to contact SW with questions, concerns, resources and for support.     Per this assessment, SW did not identify any barriers to this patient moving forward with transplant.     Important Information:   -See caregiver section. Check in with pt while inpatient to re-confirm plan.  -Pt said he plans to drive to clinic appointments post-transplant. Dr. Lucio notified and Dr. Lucio was going to tell pt he cannot drive.     Follow up Planned:   Psychosocial support     Christie MOFFETT, [facility]  Phone: [phone]  Pager: [phone]

## 2019-05-28 NOTE — DISCHARGE SUMMARY
"[facility] Discharge Summary   Agnel Yanez MRN# [id number]   Age: 58 year old  YOB: 1960   Date of Admission: 5/26/2019  Date of Discharge:  5/28/2019  Admitting Physician: Eleazar Carrasco MD  Discharge Physician:  Kody Mukherjee MD  Discharge Diagnoses:    1. 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 Neutropenic fevers  Discharge Medications:       Guille Yanez   Home Medication Instructions VAIBHAV:[id number]    Printed on:05/28/19 0114   Medication Information                      acyclovir (ZOVIRAX) 800 MG tablet  Take 1 tablet (800 mg) by mouth 2 times daily             calcipotriene (DOVONOX) 0.005 % SOLN solution  Apply topically 2 times daily as needed              calcium carbonate (CALCIUM CARBONATE) 600 MG tablet  Take 2 tablets by mouth daily              cefTRIAXone (ROCEPHIN) 2 GM vial  Inject 2 g into the vein every 24 hours To be administered in the BMT clinic             Cholecalciferol (VITAMIN D3) 2000 units CAPS  Take 2,000 Units by mouth daily              filgrastim 15 mcg/mL, in Dextrose, (NEUPOGEN) 15 mcg/ml  To be administered in the BMT clinic.             fluconazole (DIFLUCAN) 200 MG tablet  Take 1 tablet (200 mg) by mouth daily             loratadine (CLARITIN) 10 MG tablet  Take 10 mg by mouth daily             omeprazole (PRILOSEC OTC) 20 MG tablet  Take 20 mg by mouth daily              PARoxetine (PAXIL) 20 MG tablet  Take 20 mg by mouth every morning             prochlorperazine (COMPAZINE) 5 MG tablet  Take 1-2 tablets (5-10 mg) by mouth every 6 hours as needed for nausea or vomiting             sulfamethoxazole-trimethoprim (BACTRIM DS/SEPTRA DS) 800-160 MG tablet  Take 1 tablet by mouth Every Mon, Tues two times daily DO NOT START UNTIL DIRECTED BY BMT STAFF               Brief History of Illness:    **Adopted from H&P  \"Angel Yanez is a 58 year old male day + 9 s/p 2nd auto PBSCT for IgG Kappa MM presented to the ED with " "fever of 1 day duration now admitted for neutropenic fever.  Patient went to clinic this morning for platelet transfusion and went home and started having fever of 100. 5 in the afternoon.  He is been getting daily G SF but his counts have been consistently low.  Call the heme-onc team and patient was advised to go to the emergency room.  Patient denies cough shortness of breath diarrhea or abdominal pain  ED course: Patient was started on cefepime found to be neutropenic and admitted for further work-up and management.\"      Physical Exam:    /78   Pulse 97   Temp 100  F (37.8  C) (Oral)   Resp 16   Ht 1.753 m (5' 9\")   Wt 81.1 kg (178 lb 12.8 oz)   SpO2 98%   BMI 26.40 kg/m    # Discharge Pain Plan:    - Patient currently has NO PAIN and is not being prescribed pain medications on discharge.    General Appearance: well appearing, NAD.   HEENT: sclera anicteric, EOMI. Moist mucus membranes, no ulcerations or thrush.   CV: RRR, no murmur or rub.   RESP: CTA bilaterally; no rales or wheezes.  GI: +BS, soft, nontender  EXT: no edema marysol LE's  SKIN:  No rash or lesions on exposed areas.  NEURO: A&O x3; CN II-XII grossly intact.  PSYCH: Appropriate affect  VASCULAR ACCESS:  CVC  Hospital Course:    Angel Yanez is a 58 year old male, day + 11 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  BMT/MM:    - 5/17 stem cell transplant; cell dose was only 0.639x10^6. ([facility] - known poor cell dose as failed chemo-mobilization 1/2019.)   - Continue GCSF until ANC>2500 x2 consecutive days.  - Taking Claritin to alleviate bone pain related to growth factor.     2.  HEME: Keep Hgb>8 and plts>10K.   - Pt has RBC antibodies.  Antibody work up completed 5/26.   - No pre-meds.     3.  ID: Afebrile  - admitted with febrile neutropenia.  Started cefepime.  W/u ntd.  Discharge with IV rocephin to complete 1 week of empiric therapy.  If still neutropenic at the end of that course, plan to resume levaquin prophylaxis. "   - proph with HD AC, Fluconazole   -PVP proph day +28     4.  GI:    - Diarrhea improved. C.Diff negative, okay to use imodium prn  - GI prophy- omeprazole.      5.  FEN/Renal: Lytes & creat stable.   - Monitor creat and lytes. Replete lytes PRN per SS. Monitor weight in clinic.     6.  Mood:  Cont Paxil    CODE STATUS: FULL CODE  Discharge Instructions and Follow-Up:    Discharge diet: Regular diet as tolerated  Discharge activity: Activity as tolerated   Discharge follow-up: Follow up with BMT Clinic as follows: 5/29/2019 as scheduled for labs, GCSF, provider and rocephin.     Discharge Disposition:    Discharged to home.    Latrice Pride  5/28/2019    ATTENDING ADDENDUM:    I have independently seen and evaluated the patient on May 28, 2019 and reviewed clinical, laboratory, and radiographic findings. I have discussed the plan with the team and agree with the attached note with the following edits:    Angel Yanez is a 58 year old year old male, day +11 of auto, here for NF, feels well.     Ph/E: Vitals reviewed. No distress. Oropharynx clear. Neck supple. Heart RRR. Lungs clear. Abdomen soft. No peripheral edema. No rash. Neuro nonfocal.      A&P: Home on ceftriaxone. Clinic daily and G-CSF.       acyclovir  800 mg Oral BID     cefTRIAXone  2 g Intravenous Q24H     filgrastim (NEUPOGEN/GRANIX) intravenous  5 mcg/kg Intravenous Daily at 8 pm     fluconazole  200 mg Oral Daily     heparin lock flush  5-10 mL Intracatheter Q24H     loratadine  10 mg Oral Daily     omeprazole  20 mg Oral Daily     PARoxetine  20 mg Oral SRINATH Mukherjee

## 2019-05-28 NOTE — PLAN OF CARE
Patient discharged to home this evening.  His wife picked him up. Discharge paperwork and clinic appointment date explained to patient. He walked off unit accompanied by RN.  Patient had all of his questions answered.

## 2019-05-29 ENCOUNTER — HOSPITAL ENCOUNTER (INPATIENT)
Facility: CLINIC | Age: 59
LOS: 7 days | Discharge: HOME | DRG: 809 | End: 2019-06-05
Attending: INTERNAL MEDICINE | Admitting: INTERNAL MEDICINE
Payer: COMMERCIAL

## 2019-05-29 ENCOUNTER — ONCOLOGY VISIT (OUTPATIENT)
Dept: TRANSPLANT | Facility: CLINIC | Age: 59
DRG: 809 | End: 2019-05-29
Attending: PHYSICIAN ASSISTANT
Payer: COMMERCIAL

## 2019-05-29 ENCOUNTER — HOME INFUSION (PRE-WILLOW HOME INFUSION) (OUTPATIENT)
Dept: PHARMACY | Facility: CLINIC | Age: 59
End: 2019-05-29

## 2019-05-29 VITALS
RESPIRATION RATE: 16 BRPM | DIASTOLIC BLOOD PRESSURE: 81 MMHG | HEART RATE: 111 BPM | WEIGHT: 180.1 LBS | OXYGEN SATURATION: 97 % | TEMPERATURE: 101 F | BODY MASS INDEX: 26.6 KG/M2 | SYSTOLIC BLOOD PRESSURE: 114 MMHG

## 2019-05-29 VITALS
TEMPERATURE: 100.3 F | DIASTOLIC BLOOD PRESSURE: 78 MMHG | OXYGEN SATURATION: 94 % | HEART RATE: 96 BPM | SYSTOLIC BLOOD PRESSURE: 116 MMHG | RESPIRATION RATE: 16 BRPM

## 2019-05-29 DIAGNOSIS — C90.00 MULTIPLE MYELOMA NOT HAVING ACHIEVED REMISSION (H): Primary | ICD-10-CM

## 2019-05-29 DIAGNOSIS — R50.81 NEUTROPENIA WITH FEVER (H): ICD-10-CM

## 2019-05-29 DIAGNOSIS — C90.01 MULTIPLE MYELOMA IN REMISSION (H): Primary | ICD-10-CM

## 2019-05-29 DIAGNOSIS — Z94.81 S/P BONE MARROW TRANSPLANT (H): ICD-10-CM

## 2019-05-29 DIAGNOSIS — D70.9 NEUTROPENIA WITH FEVER (H): ICD-10-CM

## 2019-05-29 LAB
ANION GAP SERPL CALCULATED.3IONS-SCNC: 8 MMOL/L (ref 3–14)
APTT PPP: 44 SEC (ref 22–37)
BLD PROD TYP BPU: NORMAL
BLD UNIT ID BPU: 0
BLOOD PRODUCT CODE: NORMAL
BPU ID: NORMAL
BUN SERPL-MCNC: 10 MG/DL (ref 7–30)
CALCIUM SERPL-MCNC: 8.6 MG/DL (ref 8.5–10.1)
CHLORIDE SERPL-SCNC: 100 MMOL/L (ref 94–109)
CO2 SERPL-SCNC: 25 MMOL/L (ref 20–32)
CREAT SERPL-MCNC: 0.73 MG/DL (ref 0.66–1.25)
DIFFERENTIAL METHOD BLD: ABNORMAL
ERYTHROCYTE [DISTWIDTH] IN BLOOD BY AUTOMATED COUNT: 13.9 % (ref 10–15)
GFR SERPL CREATININE-BSD FRML MDRD: >90 ML/MIN/{1.73_M2}
GLUCOSE SERPL-MCNC: 109 MG/DL (ref 70–99)
HCT VFR BLD AUTO: 26.2 % (ref 40–53)
HGB BLD-MCNC: 9.3 G/DL (ref 13.3–17.7)
INR PPP: 1.14 (ref 0.86–1.14)
LACTATE BLD-SCNC: 1.3 MMOL/L (ref 0.7–2)
MAGNESIUM SERPL-MCNC: 2.3 MG/DL (ref 1.6–2.3)
MCH RBC QN AUTO: 32.3 PG (ref 26.5–33)
MCHC RBC AUTO-ENTMCNC: 35.5 G/DL (ref 31.5–36.5)
MCV RBC AUTO: 91 FL (ref 78–100)
PLATELET # BLD AUTO: 12 10E9/L (ref 150–450)
POTASSIUM SERPL-SCNC: 3.6 MMOL/L (ref 3.4–5.3)
RBC # BLD AUTO: 2.88 10E12/L (ref 4.4–5.9)
SODIUM SERPL-SCNC: 133 MMOL/L (ref 133–144)
TRANSFUSION STATUS PATIENT QL: NORMAL
TRANSFUSION STATUS PATIENT QL: NORMAL
WBC # BLD AUTO: 0.1 10E9/L (ref 4–11)

## 2019-05-29 PROCEDURE — P9037 PLATE PHERES LEUKOREDU IRRAD: HCPCS | Performed by: PHYSICIAN ASSISTANT

## 2019-05-29 PROCEDURE — 25000132 ZZH RX MED GY IP 250 OP 250 PS 637: Mod: ZF | Performed by: INTERNAL MEDICINE

## 2019-05-29 PROCEDURE — 87040 BLOOD CULTURE FOR BACTERIA: CPT | Performed by: PHYSICIAN ASSISTANT

## 2019-05-29 PROCEDURE — 25000128 H RX IP 250 OP 636: Mod: ZF | Performed by: PHYSICIAN ASSISTANT

## 2019-05-29 PROCEDURE — 85027 COMPLETE CBC AUTOMATED: CPT

## 2019-05-29 PROCEDURE — 25000125 ZZHC RX 250: Mod: ZF | Performed by: PHYSICIAN ASSISTANT

## 2019-05-29 PROCEDURE — 80048 BASIC METABOLIC PNL TOTAL CA: CPT | Performed by: INTERNAL MEDICINE

## 2019-05-29 PROCEDURE — 25000132 ZZH RX MED GY IP 250 OP 250 PS 637: Performed by: PHYSICIAN ASSISTANT

## 2019-05-29 PROCEDURE — 25000128 H RX IP 250 OP 636: Performed by: PHYSICIAN ASSISTANT

## 2019-05-29 PROCEDURE — 83735 ASSAY OF MAGNESIUM: CPT | Performed by: INTERNAL MEDICINE

## 2019-05-29 PROCEDURE — 87081 CULTURE SCREEN ONLY: CPT | Performed by: PHYSICIAN ASSISTANT

## 2019-05-29 PROCEDURE — 20600000 ZZH R&B BMT

## 2019-05-29 PROCEDURE — 83605 ASSAY OF LACTIC ACID: CPT | Performed by: PHYSICIAN ASSISTANT

## 2019-05-29 PROCEDURE — 85730 THROMBOPLASTIN TIME PARTIAL: CPT | Performed by: PHYSICIAN ASSISTANT

## 2019-05-29 PROCEDURE — 85610 PROTHROMBIN TIME: CPT | Performed by: PHYSICIAN ASSISTANT

## 2019-05-29 RX ORDER — POTASSIUM CHLORIDE 29.8 MG/ML
20 INJECTION INTRAVENOUS
Status: DISCONTINUED | OUTPATIENT
Start: 2019-05-29 | End: 2019-06-05 | Stop reason: HOSPADM

## 2019-05-29 RX ORDER — LIDOCAINE 40 MG/G
CREAM TOPICAL
Status: DISCONTINUED | OUTPATIENT
Start: 2019-05-29 | End: 2019-06-05 | Stop reason: HOSPADM

## 2019-05-29 RX ORDER — HEPARIN SODIUM,PORCINE 10 UNIT/ML
5 VIAL (ML) INTRAVENOUS
Status: DISCONTINUED | OUTPATIENT
Start: 2019-05-29 | End: 2019-05-29 | Stop reason: HOSPADM

## 2019-05-29 RX ORDER — CEFTRIAXONE SODIUM 2 G
2 VIAL (EA) INJECTION EVERY 24 HOURS
Status: DISCONTINUED | OUTPATIENT
Start: 2019-05-29 | End: 2019-05-29 | Stop reason: HOSPADM

## 2019-05-29 RX ORDER — HEPARIN SODIUM,PORCINE 10 UNIT/ML
5-10 VIAL (ML) INTRAVENOUS EVERY 24 HOURS
Status: DISCONTINUED | OUTPATIENT
Start: 2019-05-29 | End: 2019-06-05 | Stop reason: HOSPADM

## 2019-05-29 RX ORDER — LORATADINE 10 MG/1
10 TABLET ORAL DAILY
Status: DISCONTINUED | OUTPATIENT
Start: 2019-05-30 | End: 2019-06-05 | Stop reason: HOSPADM

## 2019-05-29 RX ORDER — VANCOMYCIN HYDROCHLORIDE 1 G/20ML
15 INJECTION, POWDER, LYOPHILIZED, FOR SOLUTION INTRAVENOUS ONCE
Status: CANCELLED
Start: 2019-05-29

## 2019-05-29 RX ORDER — HEPARIN SODIUM,PORCINE 10 UNIT/ML
5 VIAL (ML) INTRAVENOUS
Status: CANCELLED | OUTPATIENT
Start: 2019-05-30

## 2019-05-29 RX ORDER — FLUCONAZOLE 200 MG/1
200 TABLET ORAL DAILY
Status: DISCONTINUED | OUTPATIENT
Start: 2019-05-30 | End: 2019-06-05 | Stop reason: HOSPADM

## 2019-05-29 RX ORDER — ACETAMINOPHEN 325 MG/1
650 TABLET ORAL EVERY 4 HOURS PRN
Status: DISCONTINUED | OUTPATIENT
Start: 2019-05-29 | End: 2019-05-29

## 2019-05-29 RX ORDER — ACETAMINOPHEN 325 MG/1
325-650 TABLET ORAL EVERY 4 HOURS PRN
Status: DISCONTINUED | OUTPATIENT
Start: 2019-05-29 | End: 2019-05-29

## 2019-05-29 RX ORDER — VANCOMYCIN HYDROCHLORIDE 1 G/20ML
15 INJECTION, POWDER, LYOPHILIZED, FOR SOLUTION INTRAVENOUS ONCE
Status: CANCELLED
Start: 2019-05-30

## 2019-05-29 RX ORDER — POTASSIUM CL/LIDO/0.9 % NACL 10MEQ/0.1L
10 INTRAVENOUS SOLUTION, PIGGYBACK (ML) INTRAVENOUS
Status: DISCONTINUED | OUTPATIENT
Start: 2019-05-29 | End: 2019-06-05 | Stop reason: HOSPADM

## 2019-05-29 RX ORDER — PROCHLORPERAZINE MALEATE 5 MG
5-10 TABLET ORAL EVERY 6 HOURS PRN
Status: DISCONTINUED | OUTPATIENT
Start: 2019-05-29 | End: 2019-06-05 | Stop reason: HOSPADM

## 2019-05-29 RX ORDER — DIPHENHYDRAMINE HCL 25 MG
25 CAPSULE ORAL EVERY 6 HOURS PRN
Status: DISCONTINUED | OUTPATIENT
Start: 2019-05-29 | End: 2019-05-29 | Stop reason: HOSPADM

## 2019-05-29 RX ORDER — POTASSIUM CHLORIDE 7.45 MG/ML
10 INJECTION INTRAVENOUS
Status: DISCONTINUED | OUTPATIENT
Start: 2019-05-29 | End: 2019-06-05 | Stop reason: HOSPADM

## 2019-05-29 RX ORDER — DIPHENHYDRAMINE HCL 25 MG
25 CAPSULE ORAL EVERY 6 HOURS PRN
Status: DISCONTINUED | OUTPATIENT
Start: 2019-05-29 | End: 2019-06-05 | Stop reason: HOSPADM

## 2019-05-29 RX ORDER — POTASSIUM CHLORIDE 1.5 G/1.58G
20-40 POWDER, FOR SOLUTION ORAL
Status: DISCONTINUED | OUTPATIENT
Start: 2019-05-29 | End: 2019-06-05 | Stop reason: HOSPADM

## 2019-05-29 RX ORDER — PAROXETINE 20 MG/1
20 TABLET, FILM COATED ORAL EVERY MORNING
Status: DISCONTINUED | OUTPATIENT
Start: 2019-05-30 | End: 2019-06-05 | Stop reason: HOSPADM

## 2019-05-29 RX ORDER — ACETAMINOPHEN 325 MG/1
650 TABLET ORAL EVERY 4 HOURS PRN
Status: DISCONTINUED | OUTPATIENT
Start: 2019-05-29 | End: 2019-06-05 | Stop reason: HOSPADM

## 2019-05-29 RX ORDER — MAGNESIUM SULFATE HEPTAHYDRATE 40 MG/ML
4 INJECTION, SOLUTION INTRAVENOUS EVERY 4 HOURS PRN
Status: DISCONTINUED | OUTPATIENT
Start: 2019-05-29 | End: 2019-06-05 | Stop reason: HOSPADM

## 2019-05-29 RX ORDER — HEPARIN SODIUM,PORCINE 10 UNIT/ML
5-10 VIAL (ML) INTRAVENOUS
Status: DISCONTINUED | OUTPATIENT
Start: 2019-05-29 | End: 2019-06-05 | Stop reason: HOSPADM

## 2019-05-29 RX ORDER — CEFTRIAXONE 2 G/1
2 INJECTION, POWDER, FOR SOLUTION INTRAMUSCULAR; INTRAVENOUS EVERY 24 HOURS
Status: CANCELLED
Start: 2019-05-30

## 2019-05-29 RX ORDER — ACYCLOVIR 800 MG/1
800 TABLET ORAL 2 TIMES DAILY
Status: DISCONTINUED | OUTPATIENT
Start: 2019-05-29 | End: 2019-06-05 | Stop reason: HOSPADM

## 2019-05-29 RX ORDER — ACETAMINOPHEN 325 MG/1
650 TABLET ORAL EVERY 4 HOURS PRN
Status: DISCONTINUED | OUTPATIENT
Start: 2019-05-29 | End: 2019-05-29 | Stop reason: HOSPADM

## 2019-05-29 RX ORDER — POTASSIUM CHLORIDE 750 MG/1
20-40 TABLET, EXTENDED RELEASE ORAL
Status: DISCONTINUED | OUTPATIENT
Start: 2019-05-29 | End: 2019-06-05 | Stop reason: HOSPADM

## 2019-05-29 RX ADMIN — ACYCLOVIR 800 MG: 800 TABLET ORAL at 19:07

## 2019-05-29 RX ADMIN — Medication 5 ML: at 14:14

## 2019-05-29 RX ADMIN — CEFEPIME 2 G: 2 INJECTION, POWDER, FOR SOLUTION INTRAVENOUS at 19:04

## 2019-05-29 RX ADMIN — FILGRASTIM 480 MCG: 480 INJECTION, SOLUTION INTRAVENOUS; SUBCUTANEOUS at 11:08

## 2019-05-29 RX ADMIN — ACETAMINOPHEN 650 MG: 325 TABLET, FILM COATED ORAL at 21:33

## 2019-05-29 RX ADMIN — CEFTRIAXONE SODIUM 2 G: 2 INJECTION, POWDER, FOR SOLUTION INTRAMUSCULAR; INTRAVENOUS at 11:18

## 2019-05-29 RX ADMIN — DIPHENHYDRAMINE HYDROCHLORIDE 25 MG: 25 CAPSULE ORAL at 11:25

## 2019-05-29 RX ADMIN — ACETAMINOPHEN 650 MG: 325 TABLET ORAL at 11:25

## 2019-05-29 RX ADMIN — Medication 600 MG: at 19:07

## 2019-05-29 RX ADMIN — SODIUM CHLORIDE, PRESERVATIVE FREE 5 ML: 5 INJECTION INTRAVENOUS at 10:40

## 2019-05-29 RX ADMIN — SODIUM CHLORIDE, PRESERVATIVE FREE 5 ML: 5 INJECTION INTRAVENOUS at 10:41

## 2019-05-29 ASSESSMENT — MIFFLIN-ST. JEOR: SCORE: 1613.38

## 2019-05-29 ASSESSMENT — ACTIVITIES OF DAILY LIVING (ADL)
DRESS: 0-->INDEPENDENT
RETIRED_EATING: 0-->INDEPENDENT
FALL_HISTORY_WITHIN_LAST_SIX_MONTHS: NO
ADLS_ACUITY_SCORE: 11
RETIRED_COMMUNICATION: 0-->UNDERSTANDS/COMMUNICATES WITHOUT DIFFICULTY
TRANSFERRING: 0-->INDEPENDENT
SWALLOWING: 0-->SWALLOWS FOODS/LIQUIDS WITHOUT DIFFICULTY
TOILETING: 0-->INDEPENDENT
ADLS_ACUITY_SCORE: 11
ADLS_ACUITY_SCORE: 10
BATHING: 0-->INDEPENDENT
COGNITION: 0 - NO COGNITION ISSUES REPORTED
ADLS_ACUITY_SCORE: 10
AMBULATION: 0-->INDEPENDENT

## 2019-05-29 ASSESSMENT — PAIN SCALES - GENERAL: PAINLEVEL: NO PAIN (0)

## 2019-05-29 NOTE — NURSING NOTE
Chief Complaint   Patient presents with     Blood Draw     labs drawn from cvc by rn.  vs taken     Labs drawn from CVC by rn.  Good blood return noted in both lumens.  Both lumens flushed with NS and heparin.  Pt tolerated well.  VS taken; temp of 101 relayed to BMT RN and BC x 2 collected and sent as well.  Pt checked in for next appt.    Dari Florentino RN

## 2019-05-29 NOTE — PROGRESS NOTES
Infusion Nursing Note:  Angel Yanez presents today for plts, Rocephin, GCSF.    Patient seen by provider today: Yes: SHANNON Catalan   present during visit today: Not Applicable.    Note: Pt received 1u plts (pre-medicated with po tylenol and benadryl), Rocephin IV and GCSF subcutaneously.  Pt tolerated without incident.    Intravenous Access:  Hsieh.    Treatment Conditions:  Lab Results   Component Value Date    HGB 9.3 05/29/2019     Lab Results   Component Value Date    WBC 0.1 05/29/2019      Lab Results   Component Value Date    ANEU 1.3 05/23/2019     Lab Results   Component Value Date    PLT 12 05/29/2019      Results reviewed, labs MET treatment parameters, ok to proceed with treatment.      Post Infusion Assessment:  Patient tolerated infusion without incident.  Blood return noted pre and post infusion.  Site patent and intact, free from redness, edema or discomfort.  No evidence of extravasations.       Discharge Plan:   Discharge instructions reviewed with: Patient and Family.  Patient and/or family verbalized understanding of discharge instructions and all questions answered.  Patient discharged in stable condition accompanied by: son.  Departure Mode: Ambulatory.    Rosana Brown RN

## 2019-05-29 NOTE — PROGRESS NOTES
Psychosocial Assessment completed in the BMT Clinic and copied here for staff review.    Blood and Marrow Transplant   Psychosocial Assessment with   Clinical      REPEAT Assessment completed on 5/6/2019 of living situation, support system, financial status, functional status, coping, stressors, need for resources and social work intervention provided as needed. Information for this assessment was provided by pt's report in addition to medical chart review and consultation with medical team.      Present at assessment:   Patient: Angel Yanez  : ZUHAIR Woodard, JANELL     Diagnosis: Multiple Myeloma     Transplant type: Autologous     Donor: Autologous      Physician: Adebayo Lucio MD     Nurse Coordinator: Su Pate RN     Permanent Address:   [address] [place] [id number]     Contact Information:  Pt's Home Phone: [phone]  Pt's Cell Phone: [phone]  Pt Email: [email or web address]  Wife-Jennifer Yanez's Phone: [phone]  Son-Dalton Yanez's Phone: [phone]     Presenting Information:  Zaheer is a 58 year old male diagnosed with multiple myeloma who presents for evaluation for autologous transplant at the [facility] ([facility] This will be pt's 2nd autologous transplant at the [facility].     Decision Making: Self     Health Care Directive: Yes. Pt has completed the healthcare directive completed and on file.      Relationship Status: . Pt described relationship as stable and supportive.      Special Needs: None identified at this time.      Family/Support System: Pt endorsed a good support system including family and close friends who will be available to support pt throughout transplant process.      Spouse: Jennifer Yanez  Children: 3 Children; Son-Alivia Yanez (28-Lives in [place], [place]), Son-Magdiel Yanez (Lives in [place], [place]) and Daughter-Jerry Yanez (Lives in [place])  Grandchildren: 1 Grandson-Alivia Tyler (1.5 years  "old)  Parents:   Siblings: 1 Sister-Sofie Calvin (Lives in [place], [place])     Caregiver: JANELL discussed with pt the caregiver role and expectation at length. During last PSA on 1/15/2019, pt said he won t have a caregiver 24/7 but will tell me he does if that is required and pt said SW can put his sonLucille down as well  if Social Work needed another name on the caregiver contract . During the PSA today (2019), pt said \"oh yea I will have a caregiver\". Pt's wife runs a small  out of the home, so she is there during the day. SW reminded pt of the last conversation and SW repeatedly reminded pt he is required to have a caregiver 24/7 for 30 days. Pt signed the caregiver contract which will be scanned into the EMR. Pt said his wife-Jennifer and son-Dalton will be the caregivers. Caregiver education and resources provided. EULALIO Delgadillo, and Andrew are aware. Dr. Lucio said he would reinforce the caregiver requirement during the close appointment.       Caregiver Contact Information:  Wife-Jennifer Yanez's Phone: [phone]  SonKiera Yanez's Phone: [phone]     Transportation Mode: Private Vehicle. During last PSA on 1/15/2019, pt said he will be driving himself to clinic post-transplant. JANELL thoroughly explained to pt that he cannot drive post-transplant until approved by a Physician. EULALIO Delgadillo, and Andrew were notified. Dr. Lucio said he would reinforce the \"no driving\" during the close appointment as well.      Insurance: Pt has [employer] health insurance; pt has an . Pt denied specific insurance concerns at this time. JANELL reiterated information about the [employer]  should specific insurance questions arise as pt moves through transplant process. Future Insurance questions referred to [employer] -Jessica Downs (P: [phone]).      Sources of Income: Pt's income, pt went back to work, and his " "wife's income. Once pt completely stops working, pt will re-file for short term disability. Pt also has long term disability available. Pt denied anticipation of financial hardship related to BMT at this time. SW provided information on gay options and encouraged pt to contact this SW for support should financial situation change.      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 Pt's wife-Jennifer owns her own  at home.      Mental Health: Pt denied a history of mental health concerns, specific diagnoses or medications at this time. SW explained that it's not uncommon for patients going through transplant to experience symptoms of depression/anxiety.      PHQ-9:  Pt scored a 4 which indicates no sign of depression on the depression severity scale. Pt does not endorse feelings  of depression at this time.      GAD7:  Pt scored a 3 which indicates no sign of anxiety on the Generalized Anxiety Disorder Questionnaire. Pt endorses this is an accurate reflection of his emotional state.     Chemical Use: Pt reported that he quit smoking 35 years ago. Pt reported minimal alcohol consumption; 2 beers in the past 3 months. Pt reported no hx of marijuana or other drugs. Based on the information provided, there appear to be no specific risks or concerns identified at this time.      Trauma/Loss/Abuse History: Multiple losses associated with cancer diagnosis and treatment, including health, employment, changes to physical appearance, etc.      Spirituality: Pt would not like a blessing ceremony while inpatient. SW explained that there are Chaplains on the unit and pt can request to meet with a  at anytime.     Coping: Pt noted that he is currently feeling \"frustrated and ready to begin\". Pt shared that his main coping mechanisms are exercise (riding bike). SW and pt discussed additional positive coping mechanisms that pt can utilize while in the hospital. While hospitalized, pt plans to walk the " hallways and watch tv.      Education Provided: Transplant process expectations, Caregiver requirements, Caregiver self-care, Financial issues related to transplant, Financial resources/grants available, Common psychosocial stressors pre/post transplant, Support group(s) available, Hospital resources available, Social Work role and Resources for grandchild.     Interventions Provided: Psychosocial Support and Education      Assessment and Recommendations for Team:  Pt is a 58 year old male diagnosed with multiple myeloma who is here undergoing preparation for a planned autologous transplant. Pt appeared to be in good spirits during conversation. Pt is pleasant and able to articulate concerns/coping mechanisms in an appropriate manner. Pt feels comfortable communicating with the medical team. Pt has a good supportive network of family and friends who are involved. Pt has developed a few coping mechanisms such as exercising and working on his hobbies. Pt may benefit from assistance in developing coping mechanisms. Pt and pt's family will benefit from ongoing psychosocial support in regards to coping with the adjustment to the BMT process.      Pt has a good support system. Please see caregiver section for update. Pt verbalizes understanding of the transplant process and wanting to proceed. SW provided contact information and encouraged pt to contact SW with questions, concerns, resources and for support.     Per this assessment, SW did not identify any barriers to this patient moving forward with transplant.     Important Information:   -See caregiver section. Check in with pt while inpatient to re-confirm plan.  -Pt said he plans to drive to clinic appointments post-transplant. Dr. Lucio notified and Dr. Lucio was going to tell pt he cannot drive.     Follow up Planned:   Psychosocial support     Christie MOFFETT, [facility]  Phone: [phone]  Pager: [phone]

## 2019-05-29 NOTE — H&P
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 He was discharged D+1 after transplant and was followed in BMT Clinic. He was just admitted for neutropenic fever 5/26-5/28 with negative work-up, and discharged on daily Rocephin. Presented to clinic today with a temp 101. Deferveced with Tylenol. Received Rocephin and Vanco prior to readmission today for recurrent neutropenic fevers. Denies cough or congestion. Has a sore in his throat that is better today, nothing new. No N/V.  No diarrhea or constipation.  Some blood tinged mucous from his nose last night. No rash. No dysuria.    Family History:   Family History   Problem Relation Age of Onset     Diabetes Mother        Social History:   Social History     Socioeconomic History     Marital status:      Spouse name: Not on file     Number of children: Not on file     Years of education: Not on file     Highest education level: Not on file   Occupational History     Not on file   Social Needs     Financial resource strain: Not on file     Food insecurity:     Worry: Not on file     Inability: Not on file     Transportation needs:     Medical: Not on file     Non-medical: Not on file   Tobacco Use     Smoking status: Former Smoker     Smokeless tobacco: Never Used   Substance and Sexual Activity     Alcohol use: Yes     Comment: occasionaly     Drug use: No     Sexual activity: Not on file   Lifestyle     Physical activity:     Days per week: Not on file      Minutes per session: Not on file     Stress: Not on file   Relationships     Social connections:     Talks on phone: Not on file     Gets together: Not on file     Attends [employer] service: Not on file     Active member of club or organization: Not on file     Attends meetings of clubs or organizations: Not on file     Relationship status: Not on file     Intimate partner violence:     Fear of current or ex partner: Not on file     Emotionally abused: Not on file     Physically abused: Not on file     Forced sexual activity: Not on file   Other Topics Concern     Parent/sibling w/ CABG, MI or angioplasty before 65F 55M? Not Asked   Social History Narrative     Not on file       Past Medical History:   Past Medical History:   Diagnosis Date     GERD (gastroesophageal reflux disease)      H/O autologous stem cell transplant (H) 02/2005     Hyperlipidemia      Multiple myeloma (H) 2004     PONV (postoperative nausea and vomiting)      Psoriasis      Psoriatic arthritis (H)         Past Surgical History:   Past Surgical History:   Procedure Laterality Date     ARTHROPLASTY HIP       COLONOSCOPY       HERNIA REPAIR       IR CVC TUNNEL PLACEMENT > 5 YRS OF AGE  1/22/2019     IR CVC TUNNEL PLACEMENT > 5 YRS OF AGE  5/16/2019     IR CVC TUNNEL REMOVAL LEFT  2/20/2019     IR FOLLOW UP VISIT OUTPATIENT  1/24/2019     ORTHOPEDIC SURGERY       PROCURE BONE MARROW N/A 5/14/2019    Procedure: Bone Marrow Baden;  Surgeon: Antwan Erickson MD;  Location: [facility] OR     TRANSPLANT         Allergies:   Allergies   Allergen Reactions     Chlorhexidine Itching     Avalide Hives     Chloroxylenol Rash     Technicare solution     Lorazepam Hives     Other reaction(s): Hives       Moxifloxacin Hives       Home Medications      Prior to Admission medications    Medication Sig Start Date End Date Taking? Authorizing Provider   acyclovir (ZOVIRAX) 800 MG tablet Take 1 tablet (800 mg) by mouth 2 times daily 5/28/19   Latrice Pride  KAMALA MONTALVO   calcipotriene (DOVONOX) 0.005 % SOLN solution Apply topically 2 times daily as needed  12/20/17   Reported, Patient   calcium carbonate (CALCIUM CARBONATE) 600 MG tablet Take 2 tablets by mouth daily     Reported, Patient   cefTRIAXone (ROCEPHIN) 2 GM vial Inject 2 g into the vein every 24 hours To be administered in the BMT clinic 5/28/19   Latrice Pride PA-C   Cholecalciferol (VITAMIN D3) 2000 units CAPS Take 2,000 Units by mouth daily     Reported, Patient   filgrastim 15 mcg/mL, in Dextrose, (NEUPOGEN) 15 mcg/ml To be administered in the BMT clinic. 5/28/19   Latrice Pride PA-C   fluconazole (DIFLUCAN) 200 MG tablet Take 1 tablet (200 mg) by mouth daily 5/18/19   Latrice Pride PA-C   loratadine (CLARITIN) 10 MG tablet Take 10 mg by mouth daily    Reported, Patient   omeprazole (PRILOSEC OTC) 20 MG tablet Take 20 mg by mouth daily  4/7/18   Reported, Patient   PARoxetine (PAXIL) 20 MG tablet Take 20 mg by mouth every morning    Reported, Patient   prochlorperazine (COMPAZINE) 5 MG tablet Take 1-2 tablets (5-10 mg) by mouth every 6 hours as needed for nausea or vomiting 5/17/19   Latrice Pride PA-C   sulfamethoxazole-trimethoprim (BACTRIM DS/SEPTRA DS) 800-160 MG tablet Take 1 tablet by mouth Every Mon, Tues two times daily DO NOT START UNTIL DIRECTED BY BMT STAFF 6/17/19   Latrice Pride PA-C       Review of Systems    Review of Systems:  CONSTITUTIONAL: fever 101  INTEGUMENTARY/SKIN: NEGATIVE for worrisome rashes, moles or lesions  EYES: NEGATIVE for vision changes or irritation  ENT/MOUTH: mild mucositis/throat discomfort  RESP: NEGATIVE for significant cough or SOB; has a slight cough that is not new  CV: NEGATIVE for chest pain, palpitations or peripheral edema  GI: NEGATIVE for nausea  or change in bowel habits  : NEGATIVE for frequency, dysuria, or hematuria  NEURO: NEGATIVE for weakness, dizziness or paresthesias  ENDOCRINE: NEGATIVE for temperature intolerance,  "skin/hair changes  HEME/ALLERGY: NEGATIVE for bleeding problems  PSYCHIATRIC: NEGATIVE for changes in mood or affect    PHYSICAL EXAM      Weight     Wt Readings from Last 3 Encounters:   05/29/19 81.3 kg (179 lb 3.7 oz)   05/29/19 81.7 kg (180 lb 1.6 oz)   05/28/19 81.1 kg (178 lb 12.8 oz)          /72 (BP Location: Left arm)   Pulse 85   Temp 100.1  F (37.8  C) (Oral)   Resp 16   Ht 1.753 m (5' 9\")   Wt 81.3 kg (179 lb 3.7 oz)   SpO2 97%   BMI 26.47 kg/m       General: NAD, interactive, appropriate    Eyes: OP clear, buccal mucosa ridged, no ulcerations.    Lungs: CTA bilaterally  Cardiovascular: RRR, no M/R/G   Abdominal/Rectal: +BS, soft, NT, ND, No HSM   Skin: no rashes or petechaie  Neuro: A&O   Access: CVC right chest , dressing intact  Labs:  Lab Results   Component Value Date    WBC 0.1 (LL) 05/29/2019    ANEU 1.3 (L) 05/23/2019    HGB 9.3 (L) 05/29/2019    HCT 26.2 (L) 05/29/2019    PLT 12 (LL) 05/29/2019     05/29/2019    POTASSIUM 3.6 05/29/2019    CHLORIDE 100 05/29/2019    CO2 25 05/29/2019     (H) 05/29/2019    BUN 10 05/29/2019    CR 0.73 05/29/2019    MAG 2.3 05/29/2019    INR 1.07 05/27/2019    BILITOTAL 0.4 05/27/2019    AST 8 05/27/2019    ALT 15 05/27/2019    ALKPHOS 48 05/27/2019    PROTTOTAL 5.5 (L) 05/27/2019    ALBUMIN 2.9 (L) 05/27/2019       ASSESSMENT/PLAN:   Angel Yanez is a 57 yo man D+12 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    1.  BMT/MM:    - 5/17 stem cell transplant; cell dose was only 0.639x10^6. ([facility] - known poor cell dose as failed chemo-mobilization 1/2019.)   - Continue GCSF until ANC>2500 x2 consecutive days. -plan to increase filgrastim dose to 10mcg/kg 5/30  - Taking Claritin to alleviate bone pain related to growth factor.     2.  HEME: Keep Hgb>8 and plts>10K. Plts today for count of 12k.   - Pt has RBC antibodies. Antibody work up completed 5/26.   - Tylenol/Benadryl premeds.     3. "  ID: Febrile to 101 in clinic today.  No new symptoms. Blood cx pending; previous blood cx NTD. Has been on daily Rocephin since last admission for neutropenic fevers. Start Cefepime on admission.   - CMV neg 5/17, pending 5/29  - admitted with febrile neutropenia on 5/26-5/28. W/u ntd.  - proph with HD ACV, Fluconazole   - PJP proph day +28     4.  GI:  No n/v/d.  - mild mucositis. Good oral cares.  - Diarrhea resolved. 5/24 C.Diff negative, imodium prn  - GI prophy- omeprazole.   - LFTs wnl 5/27.     5.  FEN/Renal: Creat, lytes wnl.     6.  Mood:  Cont Paxil    Latrice S. Carrier  5/29/2019        Attending Note:    The patient was seen and examined by me separate from mid-level provider.   I personally reviewed the today's laboratory results, vital signs and radiology results.    Admitted from the clinic for recurrent neutropenic fevers.  No localizing symptoms.  I found that vitals are stable and there was no fever. No acute distress. Patient was alert and oriented and grossly neurologically intact. Mouth is clean. Line insertion non-tender and clear. Lungs clear bilateral. Heart regular. Abdomen soft non-tender, BS present. Lower extremity with no edema. No rash.     Main laboratory finding were   Lab Results   Component Value Date    WBC 0.1 (LL) 05/29/2019    ANEU 1.3 (L) 05/23/2019    HGB 9.3 (L) 05/29/2019    HCT 26.2 (L) 05/29/2019    PLT 12 (LL) 05/29/2019     05/29/2019    POTASSIUM 3.6 05/29/2019    CHLORIDE 100 05/29/2019    CO2 25 05/29/2019     (H) 05/29/2019    BUN 10 05/29/2019    CR 0.73 05/29/2019    MAG 2.3 05/29/2019    INR 1.14 05/29/2019    BILITOTAL 0.4 05/27/2019    AST 8 05/27/2019    ALT 15 05/27/2019    ALKPHOS 48 05/27/2019    PROTTOTAL 5.5 (L) 05/27/2019    ALBUMIN 2.9 (L) 05/27/2019         My plan is to broaden his antibiotics to cefepime and consider double dose G-CSF tomorrow if he still not improving his counts are becomes hemodynamically unstable.  Transfuse to keep  hemoglobin greater than 8 and platelets greater than 10,000.  Encourage oral intake.  Encourage physical activity, as tolerated.  Blood cultures were sent from the clinic and we will resend blood cultures tomorrow morning.  We will reassess the patient tomorrow.    Antwan Erickson M.D.

## 2019-05-29 NOTE — PLAN OF CARE
Problem: Adult Inpatient Plan of Care  Goal: Plan of Care Review  Outcome: Declining    58 yr old admitted from BMT clinic with neutropenic fever, 101 in the clinic. Had just been admitted and discharge for fever 48hr ago. Goal for discharge WBC 1.0 Denies pain/nausea. Up independently, Pleasant and cooperative. Appetite good.

## 2019-05-29 NOTE — PROGRESS NOTES
BMT progress  Note        Patient ID:  Angel Yanez is a 58 year old male, day + 10 s/p 2nd auto PBSCT for IgG Kappa MM      Diagnosis MM Multiple myeloma  HCT Type Autologous    Prep Regimen Cytoxan  Melphalan   Donor Source No data was found    GVHD Prophylaxis No  Primary BMT Provider [facility]         INTERVAL  HISTORY      Guille was discharged from the hospital yesterday after being admitted for neutropenic fevers.  He said no fevers that he knows of at home but was 100.0 before he left yesterday.  T He has had some tylenol here and his fever has come down to 99. No cough or congestion. Has a sore in his throat that is better today, nothing new  No N/V.  No diarrhea or constipation.  Some blood tinged mucous from his nose last night. No rash. No dysuria.     Review of Systems: 10 point ROS negative except as noted above.     Blood pressure 114/81, pulse 111, temperature 101  F (38.3  C), temperature source Tympanic, resp. rate 16, weight 81.7 kg (180 lb 1.6 oz), SpO2 97 %.         General: NAD, interactive, appropriate    Eyes: : FARARH, sclera anicteric   Nose/Mouth/Throat: OP clear, buccal mucosa ridged, no ulcerations.    Lungs: CTA bilaterally  Cardiovascular: RRR, no M/R/G   Abdominal/Rectal: +BS, soft, NT, ND, No HSM   Skin: no rashes or petechaie  Neuro: A&O   Access: Hsieh right side chest wall, dressing intact     Labs  Lab Results   Component Value Date    WBC 0.1 (LL) 05/29/2019    ANEU 1.3 (L) 05/23/2019    HGB 9.3 (L) 05/29/2019    HCT 26.2 (L) 05/29/2019    PLT 12 (LL) 05/29/2019     05/29/2019    POTASSIUM 3.6 05/29/2019    CHLORIDE 100 05/29/2019    CO2 25 05/29/2019     (H) 05/29/2019    BUN 10 05/29/2019    CR 0.73 05/29/2019    MAG 2.3 05/29/2019    INR 1.07 05/27/2019       Lab Results   Component Value Date    AST 8 05/27/2019     Lab Results   Component Value Date    ALT 15 05/27/2019     Lab Results   Component Value Date    BILICONJ 0.0 11/22/2005      Lab Results    Component Value Date    BILITOTAL 0.4 05/27/2019     Lab Results   Component Value Date    ALBUMIN 2.9 05/27/2019     Lab Results   Component Value Date    PROTTOTAL 5.5 05/27/2019      Lab Results   Component Value Date    ALKPHOS 48 05/27/2019          OVERALL PLAN   Angel Yanez is a 58 year old male, day + 12 s/p 2nd auto PBSCT for IgG Kappa MM  1.  BMT/MM:    - 5/17 stem cell transplant; cell dose was only 0.639x10^6. ([facility] - known poor cell dose as failed chemo-mobilization 1/2019.)   - Continue GCSF until ANC>2500 x2 consecutive days. Says he received his G-CSF already today  - Taking Claritin to alleviate bone pain related to growth factor.     2.  HEME: Keep Hgb>8 and plts>10K. plts=12, will give plts in clinic.  RBC last on 5/28.  - Pt has RBC antibodies.  Antibody work up completed 5/26.   - No pre-meds.     3.  ID: Fever to 101 upon arrival in clinic today.  No new symptoms. Tony some blood cultures.  Blood cx from 5/26 admit are neg to date. Received rocephin today and IV vancomycin.  - admitted with febrile neutropenia on 5/26.  Started cefepime.  W/u ntd.  Discharged with IV rocephin to complete 1 week of empiric therapy thru 6/1/2019.   levaquin prophylaxis on hold.   - proph with HD AC, Fluconazole   -PVP proph day +28     4.  GI:    - Diarrhea resolved. 5/24 C.Diff negative, imodium prn  - GI prophy- omeprazole.      5.  FEN/Renal: Lytes & creat stable.   - Monitor creat and lytes. Replete lytes PRN per SS. Monitor weight in clinic.  Did orthostatic blood pressures in clinic and not orthostatic so no IV fluid.     6.  Mood:  Cont Paxil    Dispo: Guille looks ok but feel very uncomfortable keeping out patient with WBC=0.1 and fever to 101.  Will readmit him for further work up of fevers.  Discussed with inpt МАРИНА    Funmi Ospina PA-C  [id number]

## 2019-05-29 NOTE — PROGRESS NOTES
This is a recent snapshot of the patient's [facility] medical record.  For current drug dose and complete information and questions, call [phone] or In Basket pool, [facility] home infusion ([id number])  CSN Number:  [id number]

## 2019-05-29 NOTE — NURSING NOTE
Admission SBAR:    Situation:  Why is the patient being admitted?  Fever    Background:  /77   Pulse 107   Temp 100.3  F (37.9  C) (Axillary)   Resp 16   SpO2 96%   Major diagnosis: MM  Type of donor: Autologous  Type of stem cells: PBSC  Relapsed? No  Summary of Interventions (if medications were administered, please specify time and color of lumen if applicable):    Antimicrobials? Yes: Rocephin    Transfusions? Yes- Platelets    Fluids? No    Neupogen? Yes: 480 mcg    Other Medications? No    Electrolytes? No    Blood cultures? Yes-2 Site(s): Large Bore CVC (e.g., Apheresis Catheter)    UA? No    UC? No    Stool culture? No    Respiratory cultures? No    Current type and cross? Yes    Completed preadmission procedures: N/A    Procedures due: N/A    If procedures are scheduled, what time? Not Applicable    Assessment:  Potential Falls risk? No -- Pt not dizzy or orthostatic, steady ambulating  Accompanied by: Son  Other Assessment: Not Applicable    Recommendations: Admit for further evaluation  Report called to Unit: 5C  Report called to Nurse: Amie  Transported by: Family  Via: Family Transport

## 2019-05-30 ENCOUNTER — APPOINTMENT (OUTPATIENT)
Dept: CT IMAGING | Facility: CLINIC | Age: 59
DRG: 809 | End: 2019-05-30
Attending: INTERNAL MEDICINE
Payer: COMMERCIAL

## 2019-05-30 LAB
ABO + RH BLD: ABNORMAL
ABO + RH BLD: ABNORMAL
ALBUMIN UR-MCNC: 10 MG/DL
ANION GAP SERPL CALCULATED.3IONS-SCNC: 7 MMOL/L (ref 3–14)
APPEARANCE UR: CLEAR
BILIRUB UR QL STRIP: NEGATIVE
BLD GP AB SCN SERPL QL: ABNORMAL
BLD PROD TYP BPU: ABNORMAL
BLD PROD TYP BPU: NORMAL
BLD PROD TYP BPU: NORMAL
BLD UNIT ID BPU: 0
BLD UNIT ID BPU: 0
BLOOD BANK CMNT PATIENT-IMP: ABNORMAL
BLOOD BANK CMNT PATIENT-IMP: ABNORMAL
BLOOD PRODUCT CODE: NORMAL
BLOOD PRODUCT CODE: NORMAL
BPU ID: NORMAL
BPU ID: NORMAL
BUN SERPL-MCNC: 14 MG/DL (ref 7–30)
CALCIUM SERPL-MCNC: 8.4 MG/DL (ref 8.5–10.1)
CHLORIDE SERPL-SCNC: 108 MMOL/L (ref 94–109)
CMV DNA SPEC NAA+PROBE-ACNC: NORMAL [IU]/ML
CMV DNA SPEC NAA+PROBE-LOG#: NORMAL {LOG_IU}/ML
CO2 SERPL-SCNC: 27 MMOL/L (ref 20–32)
COLOR UR AUTO: YELLOW
CREAT SERPL-MCNC: 0.71 MG/DL (ref 0.66–1.25)
DIFFERENTIAL METHOD BLD: ABNORMAL
ERYTHROCYTE [DISTWIDTH] IN BLOOD BY AUTOMATED COUNT: 13.7 % (ref 10–15)
GFR SERPL CREATININE-BSD FRML MDRD: >90 ML/MIN/{1.73_M2}
GLUCOSE SERPL-MCNC: 101 MG/DL (ref 70–99)
GLUCOSE UR STRIP-MCNC: NEGATIVE MG/DL
HCT VFR BLD AUTO: 26.2 % (ref 40–53)
HGB BLD-MCNC: 8.9 G/DL (ref 13.3–17.7)
HGB UR QL STRIP: NEGATIVE
KETONES UR STRIP-MCNC: NEGATIVE MG/DL
LEUKOCYTE ESTERASE UR QL STRIP: NEGATIVE
MCH RBC QN AUTO: 31.2 PG (ref 26.5–33)
MCHC RBC AUTO-ENTMCNC: 34 G/DL (ref 31.5–36.5)
MCV RBC AUTO: 92 FL (ref 78–100)
MUCOUS THREADS #/AREA URNS LPF: PRESENT /LPF
NITRATE UR QL: NEGATIVE
NUM BPU REQUESTED: 2
PH UR STRIP: 6 PH (ref 5–7)
PLATELET # BLD AUTO: 17 10E9/L (ref 150–450)
POTASSIUM SERPL-SCNC: 3.8 MMOL/L (ref 3.4–5.3)
RBC # BLD AUTO: 2.85 10E12/L (ref 4.4–5.9)
RBC #/AREA URNS AUTO: 1 /HPF (ref 0–2)
SODIUM SERPL-SCNC: 141 MMOL/L (ref 133–144)
SOURCE: ABNORMAL
SP GR UR STRIP: 1.01 (ref 1–1.03)
SPECIMEN EXP DATE BLD: ABNORMAL
SPECIMEN SOURCE: NORMAL
TRANSFUSION STATUS PATIENT QL: NORMAL
UROBILINOGEN UR STRIP-MCNC: NORMAL MG/DL (ref 0–2)
WBC # BLD AUTO: 0.1 10E9/L (ref 4–11)
WBC #/AREA URNS AUTO: 1 /HPF (ref 0–5)

## 2019-05-30 PROCEDURE — 80048 BASIC METABOLIC PNL TOTAL CA: CPT | Performed by: PHYSICIAN ASSISTANT

## 2019-05-30 PROCEDURE — 25000128 H RX IP 250 OP 636: Performed by: INTERNAL MEDICINE

## 2019-05-30 PROCEDURE — 25000132 ZZH RX MED GY IP 250 OP 250 PS 637: Performed by: PHYSICIAN ASSISTANT

## 2019-05-30 PROCEDURE — 25000128 H RX IP 250 OP 636: Performed by: PHYSICIAN ASSISTANT

## 2019-05-30 PROCEDURE — 20600000 ZZH R&B BMT

## 2019-05-30 PROCEDURE — 25800030 ZZH RX IP 258 OP 636: Performed by: PHYSICIAN ASSISTANT

## 2019-05-30 PROCEDURE — 85027 COMPLETE CBC AUTOMATED: CPT

## 2019-05-30 PROCEDURE — 70486 CT MAXILLOFACIAL W/O DYE: CPT

## 2019-05-30 PROCEDURE — 25000128 H RX IP 250 OP 636: Performed by: STUDENT IN AN ORGANIZED HEALTH CARE EDUCATION/TRAINING PROGRAM

## 2019-05-30 PROCEDURE — 71260 CT THORAX DX C+: CPT

## 2019-05-30 PROCEDURE — 25800030 ZZH RX IP 258 OP 636: Performed by: INTERNAL MEDICINE

## 2019-05-30 PROCEDURE — 81001 URINALYSIS AUTO W/SCOPE: CPT | Performed by: PHYSICIAN ASSISTANT

## 2019-05-30 PROCEDURE — 87040 BLOOD CULTURE FOR BACTERIA: CPT | Performed by: PHYSICIAN ASSISTANT

## 2019-05-30 PROCEDURE — 87103 BLOOD FUNGUS CULTURE: CPT | Performed by: PHYSICIAN ASSISTANT

## 2019-05-30 PROCEDURE — 87633 RESP VIRUS 12-25 TARGETS: CPT | Performed by: PHYSICIAN ASSISTANT

## 2019-05-30 RX ORDER — BENZONATATE 100 MG/1
100 CAPSULE ORAL 3 TIMES DAILY PRN
Status: DISCONTINUED | OUTPATIENT
Start: 2019-05-30 | End: 2019-06-05 | Stop reason: HOSPADM

## 2019-05-30 RX ORDER — IOPAMIDOL 755 MG/ML
85 INJECTION, SOLUTION INTRAVASCULAR ONCE
Status: COMPLETED | OUTPATIENT
Start: 2019-05-30 | End: 2019-05-30

## 2019-05-30 RX ADMIN — PAROXETINE HYDROCHLORIDE HEMIHYDRATE 20 MG: 20 TABLET, FILM COATED ORAL at 07:40

## 2019-05-30 RX ADMIN — FLUCONAZOLE 200 MG: 200 TABLET ORAL at 07:40

## 2019-05-30 RX ADMIN — ACYCLOVIR 800 MG: 800 TABLET ORAL at 20:04

## 2019-05-30 RX ADMIN — ACYCLOVIR 800 MG: 800 TABLET ORAL at 07:40

## 2019-05-30 RX ADMIN — Medication 600 MG: at 07:40

## 2019-05-30 RX ADMIN — CEFEPIME 2 G: 2 INJECTION, POWDER, FOR SOLUTION INTRAVENOUS at 04:06

## 2019-05-30 RX ADMIN — MELATONIN 2000 UNITS: at 07:40

## 2019-05-30 RX ADMIN — OMEPRAZOLE 20 MG: 20 CAPSULE, DELAYED RELEASE ORAL at 07:41

## 2019-05-30 RX ADMIN — LORATADINE 10 MG: 10 TABLET ORAL at 07:40

## 2019-05-30 RX ADMIN — Medication 600 MG: at 16:52

## 2019-05-30 RX ADMIN — ACETAMINOPHEN 650 MG: 325 TABLET, FILM COATED ORAL at 18:22

## 2019-05-30 RX ADMIN — Medication 5 ML: at 22:29

## 2019-05-30 RX ADMIN — CEFEPIME 2 G: 2 INJECTION, POWDER, FOR SOLUTION INTRAVENOUS at 20:03

## 2019-05-30 RX ADMIN — FILGRASTIM 780 MCG: 480 INJECTION, SOLUTION INTRAVENOUS; SUBCUTANEOUS at 21:17

## 2019-05-30 RX ADMIN — Medication 5 ML: at 20:04

## 2019-05-30 RX ADMIN — ACETAMINOPHEN 650 MG: 325 TABLET, FILM COATED ORAL at 01:53

## 2019-05-30 RX ADMIN — CEFEPIME 2 G: 2 INJECTION, POWDER, FOR SOLUTION INTRAVENOUS at 13:13

## 2019-05-30 RX ADMIN — IOPAMIDOL 85 ML: 755 INJECTION, SOLUTION INTRAVENOUS at 20:24

## 2019-05-30 RX ADMIN — VANCOMYCIN HYDROCHLORIDE 1250 MG: 10 INJECTION, POWDER, LYOPHILIZED, FOR SOLUTION INTRAVENOUS at 21:16

## 2019-05-30 ASSESSMENT — MIFFLIN-ST. JEOR: SCORE: 1585.38

## 2019-05-30 ASSESSMENT — ACTIVITIES OF DAILY LIVING (ADL)
ADLS_ACUITY_SCORE: 12
ADLS_ACUITY_SCORE: 10
ADLS_ACUITY_SCORE: 12

## 2019-05-30 NOTE — PROGRESS NOTES
BMT Daily Progress Note   05/30/2019    Patient ID:  Angel Yanez is a 59 year old male, currently day +13 of his HCT.     Diagnosis MM Multiple myeloma  HCT Type Autologous    Prep Regimen Cytoxan  Melphalan   Donor Source No data was found    GVHD Prophylaxis No  Primary BMT Provider      Angel was admitted on 5/29/2019 for febrile neutropenia.    INTERVAL  HISTORY     Guille had high fevers to 102.9 last night and sweated a lot, but otherwise felt okay.  He endorses that his cough is a bit worse, though still largely associated with talking.  Cough drops help, denies need for cough syrup at this time. No other focal complaints.  Denies headaches, urgency/pain with urination, diarrhea, n/v.  Not bleeding.      Review of Systems: 10 point ROS negative except as noted above.  # Pain Assessment:  Current Pain Score 5/30/2019   Patient currently in pain? denies   Pain descriptors -   Angel s pain level was assessed and he currently denies pain.        Scheduled Medications    acyclovir  800 mg Oral BID     calcium carbonate  600 mg Oral BID w/meals     ceFEPIme (MAXIPIME) IV  2 g Intravenous Q8H     filgrastim (NEUPOGEN/GRANIX) intravenous  780 mcg Intravenous Daily at 8 pm     fluconazole  200 mg Oral Daily     heparin lock flush  5-10 mL Intracatheter Q24H     loratadine  10 mg Oral Daily     omeprazole  20 mg Oral Daily     PARoxetine  20 mg Oral QAM     vitamin D3  2,000 Units Oral Daily     Current Facility-Administered Medications   Medication     acetaminophen (TYLENOL) tablet 650 mg     acyclovir (ZOVIRAX) tablet 800 mg     calcium carbonate (OS-ZHEN) tablet 600 mg     ceFEPIme (MAXIPIME) 2 g vial to attach to  ml bag for ADULTS or 50 ml bag for PEDS     diphenhydrAMINE (BENADRYL) capsule 25 mg     filgrastim (NEUPOGEN) 780 mcg in D5W 25 mL infusion     fluconazole (DIFLUCAN) tablet 200 mg     heparin lock flush 10 UNIT/ML injection 5-10 mL     heparin lock flush 10 UNIT/ML injection 5-10 mL      "lidocaine (LMX4) cream     lidocaine 1 % 0.1-1 mL     loratadine (CLARITIN) tablet 10 mg     magnesium sulfate 4 g in 100 mL sterile water (premade)     omeprazole (priLOSEC) CR capsule 20 mg     PARoxetine (PAXIL) tablet 20 mg     potassium chloride (KLOR-CON) Packet 20-40 mEq     potassium chloride 10 mEq in 100 mL intermittent infusion with 10 mg lidocaine     potassium chloride 10 mEq in 100 mL sterile water intermittent infusion (premix)     potassium chloride 20 mEq in 50 mL intermittent infusion     potassium chloride ER (K-DUR/KLOR-CON M) CR tablet 20-40 mEq     potassium phosphate 15 mmol in D5W 250 mL intermittent infusion     potassium phosphate 20 mmol in D5W 250 mL intermittent infusion     potassium phosphate 20 mmol in D5W 500 mL intermittent infusion     potassium phosphate 25 mmol in D5W 500 mL intermittent infusion     prochlorperazine (COMPAZINE) injection 5-10 mg    Or     prochlorperazine (COMPAZINE) tablet 5-10 mg     sodium chloride (PF) 0.9% PF flush 10-20 mL     vitamin D3 (CHOLECALCIFEROL) 1000 units (25 mcg) tablet 2,000 Units       PHYSICAL EXAM     Weight In/Out     Wt Readings from Last 3 Encounters:   05/30/19 79 kg (174 lb 2.6 oz)   05/29/19 81.7 kg (180 lb 1.6 oz)   05/28/19 81.1 kg (178 lb 12.8 oz)      I/O last 3 completed shifts:  In: 960 [P.O.:960]  Out: 2550 [Urine:2550]       KPS:  80    /72 (BP Location: Left arm)   Pulse 88   Temp 99.6  F (37.6  C) (Oral)   Resp 18   Ht 1.753 m (5' 9\")   Wt 79 kg (174 lb 2.6 oz)   SpO2 99%   BMI 25.72 kg/m         General: NAD   Eyes: : FARRAH, sclera anicteric   Nose/Mouth/Throat: OP clear, buccal mucosa moist, no ulcerations   Lungs: CTA bilaterally  Cardiovascular: RRR, no M/R/G   Abdominal/Rectal: +BS, soft, NT, ND, No HSM   Lymphatics: no edema  Skin: no rashes or petechaie  Neuro: A&O   Additional Findings: Hsieh site NT, no drainage.    LABS AND IMAGING - PAST 24 HOURS     Results for orders placed or performed during the " hospital encounter of 05/29/19 (from the past 24 hour(s))   INR   Result Value Ref Range    INR 1.14 0.86 - 1.14   Partial thromboplastin time   Result Value Ref Range    PTT 44 (H) 22 - 37 sec   CMV DNA quantification   Result Value Ref Range    CMV DNA Quantitation Specimen EDTA PLASMA     CMV Quant IU/mL CMV DNA Not Detected CMVND^CMV DNA Not Detected [IU]/mL    Log IU/mL of CMVQNT Not Calculated <2.1 [Log_IU]/mL   Vancomycin Resistant Enterococcus (VRE) Culture   Result Value Ref Range    Specimen Description Feces     Special Requests Specimen collected in eSwab transport (white cap)     Culture Micro Culture negative monitoring continues    Lactic acid level STAT for sepsis protocol   Result Value Ref Range    Lactate for Sepsis Protocol 1.3 0.7 - 2.0 mmol/L   Basic metabolic panel   Result Value Ref Range    Sodium 141 133 - 144 mmol/L    Potassium 3.8 3.4 - 5.3 mmol/L    Chloride 108 94 - 109 mmol/L    Carbon Dioxide 27 20 - 32 mmol/L    Anion Gap 7 3 - 14 mmol/L    Glucose 101 (H) 70 - 99 mg/dL    Urea Nitrogen 14 7 - 30 mg/dL    Creatinine 0.71 0.66 - 1.25 mg/dL    GFR Estimate >90 >60 mL/min/[1.73_m2]    GFR Estimate If Black >90 >60 mL/min/[1.73_m2]    Calcium 8.4 (L) 8.5 - 10.1 mg/dL   CBC with platelets differential   Result Value Ref Range    WBC 0.1 (LL) 4.0 - 11.0 10e9/L    RBC Count 2.85 (L) 4.4 - 5.9 10e12/L    Hemoglobin 8.9 (L) 13.3 - 17.7 g/dL    Hematocrit 26.2 (L) 40.0 - 53.0 %    MCV 92 78 - 100 fl    MCH 31.2 26.5 - 33.0 pg    MCHC 34.0 31.5 - 36.5 g/dL    RDW 13.7 10.0 - 15.0 %    Platelet Count 17 (LL) 150 - 450 10e9/L    Diff Method WBC <0.5, Diff not done          OVERALL PLAN   Angel Yanez is a 59 yo man D+13 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     1.  BMT/MM:    - 5/17 stem cell transplant; cell dose was only 0.639x10^6. ([facility] - known poor cell dose as failed chemo-mobilization 1/2019.)   - Continue GCSF until ANC>2500  "x2 consecutive days. -increase filgrastim dose to 10mcg/kg 5/30  - Taking Claritin to alleviate bone pain related to growth factor.     2.  HEME: Keep Hgb>8 and plts>10K. Plts today for count of 12k.   - Pt has RBC antibodies. Antibody work up completed 5/26.   - Tylenol/Benadryl premeds.     3.  ID: Tmax 24 of 102.9. No new symptoms. Blood cx ntd. Has been on daily Rocephin since last admission for neutropenic fevers. Started Cefepime on admission.  If continues to spike fevers, will plan to add vancomycin and get chest CT without contrast.   - RVP 5/30  - CMV neg 5/17, pending 5/29  - admitted with febrile neutropenia on 5/26-5/28. W/u ntd.  - proph with HD ACV, Fluconazole   - PJP proph day +28     4.  GI:  No n/v/d.  - mild mucositis. Good oral cares.  - GI prophy- omeprazole.   - LFTs wnl 5/27.     5.  FEN/Renal: Creat, lytes wnl.  Eating well.      6.  Mood:  Cont Paxmariola Pollard S. Carrier  5/30/2019      Attending Note:    The patient was seen and examined by me separate from mid-level provider.   I personally reviewed the today's laboratory results, vital signs and radiology results.    The patient is feeling fine this morning.  He still had a fever.  He does not have any localizing symptoms.  He does seems to be coughing a little more today.  He is agreeable to a respiratory virus panel swab.  I found that vitals were /80 (BP Location: Left arm)   Pulse 94   Temp 99.8  F (37.7  C) (Oral)   Resp 16   Ht 1.753 m (5' 9\")   Wt 79 kg (174 lb 2.6 oz)   SpO2 96%   BMI 25.72 kg/m  . No acute distress. Patient was alert and oriented and grossly neurologically intact. Mouth is clean. Line insertion non-tender and clear. Lungs clear bilateral. Heart regular. Abdomen soft non-tender, BS present. Lower extremity with no edema. No rash.     Main laboratory finding were   Lab Results   Component Value Date    WBC 0.1 (LL) 05/30/2019    ANEU 1.3 (L) 05/23/2019    HGB 8.9 (L) 05/30/2019    HCT 26.2 (L) " 05/30/2019    PLT 17 (LL) 05/30/2019     05/30/2019    POTASSIUM 3.8 05/30/2019    CHLORIDE 108 05/30/2019    CO2 27 05/30/2019     (H) 05/30/2019    BUN 14 05/30/2019    CR 0.71 05/30/2019    MAG 2.3 05/29/2019    INR 1.14 05/29/2019    BILITOTAL 0.4 05/27/2019    AST 8 05/27/2019    ALT 15 05/27/2019    ALKPHOS 48 05/27/2019    PROTTOTAL 5.5 (L) 05/27/2019    ALBUMIN 2.9 (L) 05/27/2019         My plan is to continue with G-CSF but double the dose.  We will send a respiratory viral panel.  We will continue with the cefepime.  The patient was encouraged to remain physically active and to eat, as tolerated.  We will continue to monitor cultures and send cultures at least once a day with recurrent fevers.  We will continue to monitor blood cultures and see if identify a more specific bacteria.  If he has recurrent fevers this afternoon because of the persistence we would want to start him on vancomycin and also send him down for a CT of the chest without contrast.  The patient understood the plan and was agreeable to that.  We will reassess him tomorrow.    Antwan Erickson M.D.

## 2019-05-30 NOTE — PLAN OF CARE
"/80 (BP Location: Left arm)   Pulse 94   Temp 99.8  F (37.7  C) (Oral)   Resp 16   Ht 1.753 m (5' 9\")   Wt 79 kg (174 lb 2.6 oz)   SpO2 96%   BMI 25.72 kg/m    Pt tmax 99.8, ovss. Pt denies nausea/pain. Throat discomfort unchanged, using cough drops. RVP sent, waiting for results. Pt offers no other complaints, continue to monitor per poc.  Problem: Adult Inpatient Plan of Care  Goal: Plan of Care Review  5/30/2019 1859 by Alejandra Mojica, RN  Outcome: No Change     "

## 2019-05-30 NOTE — PLAN OF CARE
Tmax 102.9, Tachycardiac with HR in 106s. Blood culture already drawn this morning. Given tylenol. Sepsis triggered, LA 1.3. Primary team notified. Pt is A&Ox4, calls appropriately. Denies pain/nausea. Good oral intake. Adequate UO. No diarrhea this shift. Up ad mickey. CVC with TKO. Continue with cares and update MD with any changes.   0

## 2019-05-30 NOTE — PLAN OF CARE
"Afebrile. OVSS on RA. Pt denies N/V/D and pain. Tylenol x1 given due to patient feeling like he was going to spike a temp, T max 98.9. Pt now states he is \"feeling really good\". No replacements needed. Mild mucositis noted. Scheduled cefepime given. Goal for discharge WBC 1.0. Good UOP. Continue with POC.     Problem: Adult Inpatient Plan of Care  Goal: Plan of Care Review  5/30/2019 0537 by Caitlin Smith, RN  Outcome: No Change     Problem: Adult Inpatient Plan of Care  Goal: Absence of Hospital-Acquired Illness or Injury  5/30/2019 0537 by Caitlin Smith, RN  Outcome: No Change     Problem: Adult Inpatient Plan of Care  Goal: Optimal Comfort and Wellbeing  5/30/2019 0537 by Caitlin Smith, RN  Outcome: No Change     Problem: Adjustment to Transplant (Stem Cell/Bone Marrow Transplant)  Goal: Optimal Coping with Transplant  5/30/2019 0537 by Caitlni Smith, RN  Outcome: No Change     "

## 2019-05-31 LAB
ANION GAP SERPL CALCULATED.3IONS-SCNC: 5 MMOL/L (ref 3–14)
BLD PROD TYP BPU: NORMAL
BLD UNIT ID BPU: 0
BLD UNIT ID BPU: 0
BLOOD PRODUCT CODE: NORMAL
BLOOD PRODUCT CODE: NORMAL
BPU ID: NORMAL
BPU ID: NORMAL
BUN SERPL-MCNC: 15 MG/DL (ref 7–30)
CALCIUM SERPL-MCNC: 8.3 MG/DL (ref 8.5–10.1)
CHLORIDE SERPL-SCNC: 109 MMOL/L (ref 94–109)
CO2 SERPL-SCNC: 28 MMOL/L (ref 20–32)
CREAT SERPL-MCNC: 0.72 MG/DL (ref 0.66–1.25)
DIFFERENTIAL METHOD BLD: ABNORMAL
ERYTHROCYTE [DISTWIDTH] IN BLOOD BY AUTOMATED COUNT: 13.5 % (ref 10–15)
FLUAV H1 2009 PAND RNA SPEC QL NAA+PROBE: NEGATIVE
FLUAV H1 RNA SPEC QL NAA+PROBE: NEGATIVE
FLUAV H3 RNA SPEC QL NAA+PROBE: NEGATIVE
FLUAV RNA SPEC QL NAA+PROBE: NEGATIVE
FLUBV RNA SPEC QL NAA+PROBE: NEGATIVE
GFR SERPL CREATININE-BSD FRML MDRD: >90 ML/MIN/{1.73_M2}
GLUCOSE SERPL-MCNC: 92 MG/DL (ref 70–99)
HADV DNA SPEC QL NAA+PROBE: NEGATIVE
HADV DNA SPEC QL NAA+PROBE: NEGATIVE
HCT VFR BLD AUTO: 23.4 % (ref 40–53)
HGB BLD-MCNC: 7.9 G/DL (ref 13.3–17.7)
HMPV RNA SPEC QL NAA+PROBE: NEGATIVE
HPIV1 RNA SPEC QL NAA+PROBE: NEGATIVE
HPIV2 RNA SPEC QL NAA+PROBE: NEGATIVE
HPIV3 RNA SPEC QL NAA+PROBE: POSITIVE
LACTATE BLD-SCNC: 1.1 MMOL/L (ref 0.7–2)
MCH RBC QN AUTO: 31.2 PG (ref 26.5–33)
MCHC RBC AUTO-ENTMCNC: 33.8 G/DL (ref 31.5–36.5)
MCV RBC AUTO: 93 FL (ref 78–100)
MICROBIOLOGIST REVIEW: ABNORMAL
NUM BPU REQUESTED: 1
PLATELET # BLD AUTO: 7 10E9/L (ref 150–450)
POTASSIUM SERPL-SCNC: 3.9 MMOL/L (ref 3.4–5.3)
RBC # BLD AUTO: 2.53 10E12/L (ref 4.4–5.9)
RHINOVIRUS RNA SPEC QL NAA+PROBE: NEGATIVE
RSV RNA SPEC QL NAA+PROBE: NEGATIVE
RSV RNA SPEC QL NAA+PROBE: NEGATIVE
SODIUM SERPL-SCNC: 141 MMOL/L (ref 133–144)
SPECIMEN SOURCE: ABNORMAL
TRANSFUSION STATUS PATIENT QL: NORMAL
WBC # BLD AUTO: 0.2 10E9/L (ref 4–11)

## 2019-05-31 PROCEDURE — 25000128 H RX IP 250 OP 636: Performed by: PHYSICIAN ASSISTANT

## 2019-05-31 PROCEDURE — 80048 BASIC METABOLIC PNL TOTAL CA: CPT | Performed by: PHYSICIAN ASSISTANT

## 2019-05-31 PROCEDURE — 86850 RBC ANTIBODY SCREEN: CPT | Performed by: INTERNAL MEDICINE

## 2019-05-31 PROCEDURE — 25000128 H RX IP 250 OP 636: Performed by: INTERNAL MEDICINE

## 2019-05-31 PROCEDURE — 87103 BLOOD FUNGUS CULTURE: CPT | Performed by: PHYSICIAN ASSISTANT

## 2019-05-31 PROCEDURE — 85027 COMPLETE CBC AUTOMATED: CPT

## 2019-05-31 PROCEDURE — 86901 BLOOD TYPING SEROLOGIC RH(D): CPT | Performed by: INTERNAL MEDICINE

## 2019-05-31 PROCEDURE — 20600000 ZZH R&B BMT

## 2019-05-31 PROCEDURE — P9040 RBC LEUKOREDUCED IRRADIATED: HCPCS | Performed by: INTERNAL MEDICINE

## 2019-05-31 PROCEDURE — 25000132 ZZH RX MED GY IP 250 OP 250 PS 637: Performed by: INTERNAL MEDICINE

## 2019-05-31 PROCEDURE — 83605 ASSAY OF LACTIC ACID: CPT | Performed by: INTERNAL MEDICINE

## 2019-05-31 PROCEDURE — 25800030 ZZH RX IP 258 OP 636: Performed by: PHYSICIAN ASSISTANT

## 2019-05-31 PROCEDURE — 87040 BLOOD CULTURE FOR BACTERIA: CPT | Performed by: PHYSICIAN ASSISTANT

## 2019-05-31 PROCEDURE — 86900 BLOOD TYPING SEROLOGIC ABO: CPT | Performed by: INTERNAL MEDICINE

## 2019-05-31 PROCEDURE — 25800030 ZZH RX IP 258 OP 636: Performed by: INTERNAL MEDICINE

## 2019-05-31 PROCEDURE — P9037 PLATE PHERES LEUKOREDU IRRAD: HCPCS | Performed by: PHYSICIAN ASSISTANT

## 2019-05-31 PROCEDURE — 86902 BLOOD TYPE ANTIGEN DONOR EA: CPT | Performed by: INTERNAL MEDICINE

## 2019-05-31 PROCEDURE — 25000132 ZZH RX MED GY IP 250 OP 250 PS 637: Performed by: PHYSICIAN ASSISTANT

## 2019-05-31 RX ADMIN — VANCOMYCIN HYDROCHLORIDE 1250 MG: 10 INJECTION, POWDER, LYOPHILIZED, FOR SOLUTION INTRAVENOUS at 08:56

## 2019-05-31 RX ADMIN — ACETAMINOPHEN 650 MG: 325 TABLET, FILM COATED ORAL at 05:13

## 2019-05-31 RX ADMIN — ACETAMINOPHEN 650 MG: 325 TABLET, FILM COATED ORAL at 23:04

## 2019-05-31 RX ADMIN — Medication 5 ML: at 05:25

## 2019-05-31 RX ADMIN — CEFEPIME 2 G: 2 INJECTION, POWDER, FOR SOLUTION INTRAVENOUS at 19:54

## 2019-05-31 RX ADMIN — PAROXETINE HYDROCHLORIDE HEMIHYDRATE 20 MG: 20 TABLET, FILM COATED ORAL at 08:56

## 2019-05-31 RX ADMIN — LORATADINE 10 MG: 10 TABLET ORAL at 08:55

## 2019-05-31 RX ADMIN — ACYCLOVIR 800 MG: 800 TABLET ORAL at 08:56

## 2019-05-31 RX ADMIN — OMEPRAZOLE 20 MG: 20 CAPSULE, DELAYED RELEASE ORAL at 08:56

## 2019-05-31 RX ADMIN — FILGRASTIM 780 MCG: 480 INJECTION, SOLUTION INTRAVENOUS; SUBCUTANEOUS at 21:05

## 2019-05-31 RX ADMIN — Medication 5 ML: at 14:35

## 2019-05-31 RX ADMIN — MELATONIN 2000 UNITS: at 08:57

## 2019-05-31 RX ADMIN — CEFEPIME 2 G: 2 INJECTION, POWDER, FOR SOLUTION INTRAVENOUS at 03:36

## 2019-05-31 RX ADMIN — ACYCLOVIR 800 MG: 800 TABLET ORAL at 19:55

## 2019-05-31 RX ADMIN — CEFEPIME 2 G: 2 INJECTION, POWDER, FOR SOLUTION INTRAVENOUS at 12:01

## 2019-05-31 RX ADMIN — Medication 5 ML: at 22:18

## 2019-05-31 RX ADMIN — Medication 5 ML: at 03:30

## 2019-05-31 RX ADMIN — Medication 600 MG: at 18:20

## 2019-05-31 RX ADMIN — BENZONATATE 100 MG: 100 CAPSULE ORAL at 03:40

## 2019-05-31 RX ADMIN — Medication 600 MG: at 08:56

## 2019-05-31 RX ADMIN — FLUCONAZOLE 200 MG: 200 TABLET ORAL at 08:56

## 2019-05-31 RX ADMIN — DIPHENHYDRAMINE HYDROCHLORIDE 25 MG: 25 CAPSULE ORAL at 05:13

## 2019-05-31 RX ADMIN — ACETAMINOPHEN 650 MG: 325 TABLET, FILM COATED ORAL at 09:07

## 2019-05-31 RX ADMIN — Medication 5 ML: at 12:00

## 2019-05-31 RX ADMIN — BENZONATATE 100 MG: 100 CAPSULE ORAL at 21:57

## 2019-05-31 RX ADMIN — ACETAMINOPHEN 650 MG: 325 TABLET, FILM COATED ORAL at 16:37

## 2019-05-31 ASSESSMENT — ACTIVITIES OF DAILY LIVING (ADL)
ADLS_ACUITY_SCORE: 12

## 2019-05-31 ASSESSMENT — MIFFLIN-ST. JEOR: SCORE: 1600.06

## 2019-05-31 NOTE — PLAN OF CARE
Tmax: 102.5, slightly tachycardic (), OVSS. Denies pain/nausea/SOB. Non-productive cough noted. RVP is parainfluenza (+). Received 1u RBC. Sepsis protocol triggered, lactic acid 1.1. Pt voices no concerns. Will continue to monitor, following plan of care.     Problem: Adult Inpatient Plan of Care  Goal: Plan of Care Review  5/31/2019 1841 by Leslie Cantu, RN  Outcome: No Change     Problem: Adult Inpatient Plan of Care  Goal: Patient-Specific Goal (Individualization)  5/31/2019 1841 by Leslie Cantu, RN  Outcome: No Change     Problem: Adult Inpatient Plan of Care  Goal: Optimal Comfort and Wellbeing  5/31/2019 1841 by Leslie Cantu, RN  Outcome: No Change     Problem: Adjustment to Transplant (Stem Cell/Bone Marrow Transplant)  Goal: Optimal Coping with Transplant  5/31/2019 1841 by Leslie Cantu, RN  Outcome: No Change     Problem: Fatigue (Stem Cell/Bone Marrow Transplant)  Goal: Energy Level Supports Daily Activity  5/31/2019 1841 by Leslie Cantu, RN  Outcome: No Change     Problem: Nutrition Intake Altered (Stem Cell/Bone Marrow Transplant)  Goal: Optimal Nutrition Intake  5/31/2019 1841 by Leslie Cantu, RN  Outcome: No Change

## 2019-05-31 NOTE — PHARMACY-VANCOMYCIN DOSING SERVICE
Pharmacy Vancomycin Initial Note  Date of Service May 30, 2019  Patient's  1960  59 year old, male  Actual Body Weight: 79 kg    Indication: Febrile Neutropenia    Current estimated CrCl = Estimated Creatinine Clearance: 122.1 mL/min (based on SCr of 0.71 mg/dL).    Creatinine for last 3 days  2019:  3:24 AM Creatinine 0.76 mg/dL  2019: 10:48 AM Creatinine 0.73 mg/dL  2019:  4:07 AM Creatinine 0.71 mg/dL    Recent Vancomycin Level(s) for last 3 days  No results found for requested labs within last 72 hours.      Vancomycin IV Administrations (past 72 hours)      No vancomycin orders with administrations in past 72 hours.                Nephrotoxins and other renal medications (From now, onward)    Start     Dose/Rate Route Frequency Ordered Stop    19  acyclovir (ZOVIRAX) tablet 800 mg      800 mg Oral 2 TIMES DAILY 19 1318            Contrast Orders - past 72 hours (72h ago, onward)    None              Plan:  1.  Start vancomycin  1250 mg IV q12h.   2.  Goal Trough Level: 10-15 mg/L   3.  Pharmacy will check trough levels as appropriate in 1-3 Days.    4.  Serum creatinine levels will be ordered every other day for at least 10 days while on concomitant nephrotoxins.    5.  Trey method utilized to dose vancomycin therapy: Method 2    Eve Armstrong PharmD  [phone] (text capable)

## 2019-05-31 NOTE — PROGRESS NOTES
This is a recent snapshot of the patient's [facility] medical record.  For current drug dose and complete information and questions, call [phone] or In [facility] pool, fv home infusion ([id number])  CSN Number:  [id number]

## 2019-05-31 NOTE — PROVIDER NOTIFICATION
"MD paged at [phone] with temp of 100.6.    /75 (BP Location: Left arm)   Pulse 94   Temp 100.6  F (38.1  C) (Axillary)   Resp 16   Ht 1.753 m (5' 9\")   Wt 79 kg (174 lb 2.6 oz)   SpO2 97%   BMI 25.72 kg/m      "

## 2019-05-31 NOTE — PROGRESS NOTES
BMT Daily Progress Note   05/31/2019    Patient ID:  Angel Yanez is a 59 year old male, currently day +15 of his HCT.     Diagnosis MM Multiple myeloma  HCT Type Autologous    Prep Regimen Cytoxan  Melphalan   Donor Source No data was found    GVHD Prophylaxis No  Primary BMT Provider      Angel was admitted on 5/29/2019 for febrile neutropenia.    INTERVAL  HISTORY     Guille feels overall the same.  He still has once a day fevers.  He has intermittent dry cough but no shortness of breath.  He has no sputum.  He does not have a sore throat at the time.  He has decent energy and he has been up and out of bed.  Denies headaches, urgency/pain with urination, diarrhea, n/v.  Not bleeding.  No rash.  Eating/drinking well.     Review of Systems: 10 point ROS negative except as noted above.  # Pain Assessment:  Current Pain Score 5/31/2019   Patient currently in pain? denies   Pain descriptors -   Angel s pain level was assessed and he currently denies pain.        Scheduled Medications    acyclovir  800 mg Oral BID     calcium carbonate  600 mg Oral BID w/meals     ceFEPIme (MAXIPIME) IV  2 g Intravenous Q8H     filgrastim (NEUPOGEN/GRANIX) intravenous  780 mcg Intravenous Daily at 8 pm     fluconazole  200 mg Oral Daily     heparin lock flush  5-10 mL Intracatheter Q24H     loratadine  10 mg Oral Daily     omeprazole  20 mg Oral Daily     PARoxetine  20 mg Oral QAM     vancomycin (VANCOCIN) IV  1,250 mg Intravenous Q12H     vitamin D3  2,000 Units Oral Daily     Current Facility-Administered Medications   Medication     acetaminophen (TYLENOL) tablet 650 mg     acyclovir (ZOVIRAX) tablet 800 mg     benzonatate (TESSALON) capsule 100 mg     calcium carbonate (OS-ZHEN) tablet 600 mg     ceFEPIme (MAXIPIME) 2 g vial to attach to  ml bag for ADULTS or 50 ml bag for PEDS     diphenhydrAMINE (BENADRYL) capsule 25 mg     filgrastim (NEUPOGEN) 780 mcg in D5W 25 mL infusion     fluconazole (DIFLUCAN) tablet 200 mg      "heparin lock flush 10 UNIT/ML injection 5-10 mL     heparin lock flush 10 UNIT/ML injection 5-10 mL     lidocaine (LMX4) cream     lidocaine 1 % 0.1-1 mL     loratadine (CLARITIN) tablet 10 mg     magnesium sulfate 4 g in 100 mL sterile water (premade)     omeprazole (priLOSEC) CR capsule 20 mg     PARoxetine (PAXIL) tablet 20 mg     potassium chloride (KLOR-CON) Packet 20-40 mEq     potassium chloride 10 mEq in 100 mL intermittent infusion with 10 mg lidocaine     potassium chloride 10 mEq in 100 mL sterile water intermittent infusion (premix)     potassium chloride 20 mEq in 50 mL intermittent infusion     potassium chloride ER (K-DUR/KLOR-CON M) CR tablet 20-40 mEq     potassium phosphate 15 mmol in D5W 250 mL intermittent infusion     potassium phosphate 20 mmol in D5W 250 mL intermittent infusion     potassium phosphate 20 mmol in D5W 500 mL intermittent infusion     potassium phosphate 25 mmol in D5W 500 mL intermittent infusion     prochlorperazine (COMPAZINE) injection 5-10 mg    Or     prochlorperazine (COMPAZINE) tablet 5-10 mg     sodium chloride (PF) 0.9% PF flush 10-20 mL     vancomycin 1250 mg in 0.9% NaCl 250 mL intermittent infusion 1,250 mg     vitamin D3 (CHOLECALCIFEROL) 1000 units (25 mcg) tablet 2,000 Units       PHYSICAL EXAM     Weight In/Out     Wt Readings from Last 3 Encounters:   05/31/19 80.5 kg (177 lb 6.4 oz)   05/29/19 81.7 kg (180 lb 1.6 oz)   05/28/19 81.1 kg (178 lb 12.8 oz)      I/O last 3 completed shifts:  In: 1710 [P.O.:1050; I.V.:660]  Out: 1000 [Urine:1000]       KPS:  80    /86 (BP Location: Left arm)   Pulse 93   Temp 101.7  F (38.7  C) (Oral)   Resp 16   Ht 1.753 m (5' 9\")   Wt 80.2 kg (176 lb 14.4 oz)   SpO2 99%   BMI 26.12 kg/m   He also had a temperature to 102.5 yesterday night.    General: NAD   Eyes: : FARRAH, sclera anicteric   Nose/Mouth/Throat: OP clear, buccal mucosa moist, no ulcerations   Lungs: CTA bilaterally there were no crackles or wheezes., "   Cardiovascular: RRR, no M/R/G   Abdominal/Rectal: +BS, soft, NT, ND, No HSM   Lymphatics: no edema  Skin: no rashes or petechaie  Neuro: A&O   Additional Findings: Hsieh site NT, no drainage.    LABS AND IMAGING - PAST 24 HOURS     Results for orders placed or performed during the hospital encounter of 05/29/19 (from the past 24 hour(s))   Blood culture   Result Value Ref Range    Specimen Description Blood PURPLE PORT     Special Requests Received in aerobic bottle only     Culture Micro No growth after 8 hours    Blood culture   Result Value Ref Range    Specimen Description Blood Red port     Special Requests Received in aerobic bottle only     Culture Micro No growth after 8 hours    Blood culture yeast   Result Value Ref Range    Specimen Description Blood PURPLE PORT     Special Requests Received in aerobic bottle only     Culture Micro No growth after 8 hours    Blood culture yeast   Result Value Ref Range    Specimen Description Blood Red port     Special Requests Received in aerobic bottle only     Culture Micro No growth after 8 hours    CT Chest w Contrast    Narrative    EXAMINATION: CT CHEST W CONTRAST  5/30/2019 8:37 PM      CLINICAL HISTORY: Neutropenic fevers, S/P peripheral blood stem cell  transplant.    COMPARISON: Radiograph 5/26/2019    TECHNIQUE: CT imaging obtained through the chest with contrast.  Coronal and axial MIP reformatted images obtained.     FINDINGS:  Lines and tubes: Right internal jugular central venous catheter with  tip in the mid SVC.    Mediastinum: No thyroid nodules. Central tracheobronchial tree is  patent. Heart size is normal. No pericardial effusion.  Normal  thoracic vasculature. Calcified mediastinal and hilar nodes. No  thoracic lymphadenopathy.     Lungs: No consolidation. No effusion or pneumothorax. 4 mm calcified  focus in the peripheral right lower lobe likely represents prior  infection.    Bones and soft tissues: No suspicious bone findings. Multiple  wedge  compression deformities throughout the thoracic spine, similar to the  lateral projection of 5/26/2019 chest x-ray.    Upper abdomen: Limited. No acute abnormality. Duodenal diverticulum.      Impression    IMPRESSION:   Clear lungs. No findings to explain patient's symptoms.    I have personally reviewed the examination and initial interpretation  and I agree with the findings.    EDWARD CAPONE MD   CT Sinus w/o Contrast    Narrative    Paranasal sinus CT without contrast    History:  fever without source, neutropenia.    Comparison:  none     Technique:  Images through the paranasal sinuses without intravenous  contrast with coronal reconstructions.    Findings:     Maxillary sinuses: Mild bilateral mucosal thickening with small amount  of aerated mucoid debris in the left maxillary sinus.  Minimal mucosal  thickening in the right maxillary sinus.  Frontal sinuses: Mucosal thickening in the left frontoethmoidal  recess..    Ethmoid air cells: Mucosal thickening, left greater than right in the  anterior ethmoid air cells.  Sphenoid sinus: Minimal mucosal thickening.     Bilateral ostiomeatal units are patent. The bony walls of the  paranasal sinuses are intact..   The adenoid tonsils in the nasopharynx are unremarkable.      Impression    Impression:    Inflammatory pansinusitis. Minimal mucoid debris in the left maxillary  sinus seems unlikely to represent acute sinusitis.    I have personally reviewed the examination and initial interpretation  and I agree with the findings.    WERO KASPER MD   Routine UA with microscopic   Result Value Ref Range    Color Urine Yellow     Appearance Urine Clear     Glucose Urine Negative NEG^Negative mg/dL    Bilirubin Urine Negative NEG^Negative    Ketones Urine Negative NEG^Negative mg/dL    Specific Gravity Urine 1.010 1.003 - 1.035    Blood Urine Negative NEG^Negative    pH Urine 6.0 5.0 - 7.0 pH    Protein Albumin Urine 10 (A) NEG^Negative mg/dL    Urobilinogen  mg/dL Normal 0.0 - 2.0 mg/dL    Nitrite Urine Negative NEG^Negative    Leukocyte Esterase Urine Negative NEG^Negative    Source Midstream Urine     WBC Urine 1 0 - 5 /HPF    RBC Urine 1 0 - 2 /HPF    Mucous Urine Present (A) NEG^Negative /LPF   Basic metabolic panel   Result Value Ref Range    Sodium 141 133 - 144 mmol/L    Potassium 3.9 3.4 - 5.3 mmol/L    Chloride 109 94 - 109 mmol/L    Carbon Dioxide 28 20 - 32 mmol/L    Anion Gap 5 3 - 14 mmol/L    Glucose 92 70 - 99 mg/dL    Urea Nitrogen 15 7 - 30 mg/dL    Creatinine 0.72 0.66 - 1.25 mg/dL    GFR Estimate >90 >60 mL/min/[1.73_m2]    GFR Estimate If Black >90 >60 mL/min/[1.73_m2]    Calcium 8.3 (L) 8.5 - 10.1 mg/dL   CBC with platelets differential   Result Value Ref Range    WBC 0.2 (LL) 4.0 - 11.0 10e9/L    RBC Count 2.53 (L) 4.4 - 5.9 10e12/L    Hemoglobin 7.9 (L) 13.3 - 17.7 g/dL    Hematocrit 23.4 (L) 40.0 - 53.0 %    MCV 93 78 - 100 fl    MCH 31.2 26.5 - 33.0 pg    MCHC 33.8 31.5 - 36.5 g/dL    RDW 13.5 10.0 - 15.0 %    Platelet Count 7 (LL) 150 - 450 10e9/L    Diff Method WBC <0.5, Diff not done    ABO/Rh type and screen   Result Value Ref Range    Units Ordered 1     ABO O     RH(D) Pos     Antibody Screen Pos (A)     Test Valid Only At          [facility]    Specimen Expires 06/03/2019     Crossmatch Red Blood Cells     Blood Bank Comment       Delay in availability of Red Blood Cells called to  Latrice Miranda @0635 05/31/19. Patient given HAILEY on 04/19/19 which is known to cause a   positive antibody screen. All clinical significant antibodies have been ruled out   05/31/19. aa     Blood component   Result Value Ref Range    Unit Number [id number]     Blood Component Type Red Blood Cells LeukoReduced Irradiated     Division Number 00     Status of Unit Released to care unit 05/31/2019 0915     Blood Product Code [id number]     Unit Status ISS    Platelets prepare order unit conditional   Result Value Ref  Range    Blood Component Type PLT Pheresis     Units Ordered 1    Blood component   Result Value Ref Range    Unit Number [id number]     Blood Component Type PlateletPheresis LeukoReduced Irradiated     Division Number 00     Status of Unit Released to care unit 05/31/2019 0616     Blood Product Code [id number]     Unit Status ISS    Blood culture   Result Value Ref Range    Specimen Description Blood PURPLE PORT     Special Requests Received in aerobic bottle only     Culture Micro PENDING    Blood culture yeast   Result Value Ref Range    Specimen Description Blood PURPLE PORT     Special Requests Received in aerobic bottle only     Culture Micro PENDING    Blood culture   Result Value Ref Range    Specimen Description Blood Red port     Special Requests Received in aerobic bottle only     Culture Micro PENDING    Blood culture yeast   Result Value Ref Range    Specimen Description Blood Red port     Special Requests Received in aerobic bottle only     Culture Micro PENDING          OVERALL PLAN   Angel Yanez is a 59 yo man D+15 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     1.  BMT/MM:    - 5/17 stem cell transplant; cell dose was only 0.639x10^6. ([facility] [facility] - known poor cell dose as failed chemo-mobilization 1/2019.)   - Continue GCSF until ANC>2500 x2 consecutive days. -increased filgrastim dose to 10mcg/kg 5/30  - Taking Claritin to alleviate bone pain related to growth factor.     2.  HEME: Keep Hgb>8 and plts>10K.  He has now a slight improvement in his white cell count to 300 today.  His platelets were 7 and he will receive a transfusion.    - Pt has RBC antibodies. Antibody work up completed 5/26.   - Tylenol/Benadryl premeds.     3.  ID: Continues to have intermittent fevers up to 102.5, but is not toxemic.  We do know that he has parainfluenza 3.  There is no good therapy for this type of viruses.  He does not have lower respiratory disease.  We will  continue to monitor him closely and continue with the cefepime for neutropenic fever.  The vancomycin has already been discontinued once we found the positive parainfluenza 3.  The patient knows to call if symptoms worsen, in particular respiratory symptoms such as shortness of breath.    - CMV neg 5/17, pending 5/29  - admitted with febrile neutropenia on 5/26-5/28. W/u ntd.  - proph with HD ACV, Fluconazole   - PJP proph day +28     4.  GI:  No n/v/d.  - mild mucositis. Good oral cares.  - GI prophy- omeprazole.   - LFTs wnl 5/27.     5.  FEN/Renal: Creat, lytes wnl.  Eating well.      6.  Mood:  Cont Vera Erickson

## 2019-05-31 NOTE — PLAN OF CARE
Temperature maximum  100.6 overnight. Tylenol given. Blood cultures drawn from each lumen. On cefepime. Started vancomycin last evening. CT of chest and sinuses obtained and negative results. Tessalon deepthi given x1 for cough and patient reports relief. Receiving 1 unit of platelets this AM. Will need 1 unit of PRBCs, but delayed due to positive antibody screen. Continue to monitor.      Problem: Adult Inpatient Plan of Care  Goal: Plan of Care Review  5/31/2019 0634 by Latrice Miranda RN  Outcome: No Change     Problem: Adult Inpatient Plan of Care  Goal: Patient-Specific Goal (Individualization)  Outcome: No Change     Problem: Adult Inpatient Plan of Care  Goal: Absence of Hospital-Acquired Illness or Injury  Outcome: No Change     Problem: Adult Inpatient Plan of Care  Goal: Optimal Comfort and Wellbeing  Outcome: No Change     Problem: Adjustment to Transplant (Stem Cell/Bone Marrow Transplant)  Goal: Optimal Coping with Transplant  Outcome: No Change     Problem: Fatigue (Stem Cell/Bone Marrow Transplant)  Goal: Energy Level Supports Daily Activity  Outcome: No Change     Problem: Mucositis (Stem Cell/Bone Marrow Transplant)  Goal: Mucous Membrane Health and Integrity  Outcome: No Change     Problem: Nausea and Vomiting (Stem Cell/Bone Marrow Transplant)  Goal: Nausea and Vomiting Symptom Relief  Outcome: No Change     Problem: Nutrition Intake Altered (Stem Cell/Bone Marrow Transplant)  Goal: Optimal Nutrition Intake  Outcome: No Change

## 2019-06-01 LAB
ANION GAP SERPL CALCULATED.3IONS-SCNC: 5 MMOL/L (ref 3–14)
BACTERIA SPEC CULT: NO GROWTH
BACTERIA SPEC CULT: NO GROWTH
BUN SERPL-MCNC: 10 MG/DL (ref 7–30)
CALCIUM SERPL-MCNC: 8.5 MG/DL (ref 8.5–10.1)
CHLORIDE SERPL-SCNC: 106 MMOL/L (ref 94–109)
CO2 SERPL-SCNC: 28 MMOL/L (ref 20–32)
CREAT SERPL-MCNC: 0.72 MG/DL (ref 0.66–1.25)
DIFFERENTIAL METHOD BLD: ABNORMAL
ERYTHROCYTE [DISTWIDTH] IN BLOOD BY AUTOMATED COUNT: 13.8 % (ref 10–15)
GFR SERPL CREATININE-BSD FRML MDRD: >90 ML/MIN/{1.73_M2}
GLUCOSE SERPL-MCNC: 92 MG/DL (ref 70–99)
HCT VFR BLD AUTO: 25.8 % (ref 40–53)
HGB BLD-MCNC: 8.7 G/DL (ref 13.3–17.7)
LACTATE BLD-SCNC: 0.6 MMOL/L (ref 0.7–2)
Lab: NORMAL
Lab: NORMAL
MAGNESIUM SERPL-MCNC: 2.2 MG/DL (ref 1.6–2.3)
MCH RBC QN AUTO: 30.4 PG (ref 26.5–33)
MCHC RBC AUTO-ENTMCNC: 33.7 G/DL (ref 31.5–36.5)
MCV RBC AUTO: 90 FL (ref 78–100)
PLATELET # BLD AUTO: 24 10E9/L (ref 150–450)
POTASSIUM SERPL-SCNC: 3.6 MMOL/L (ref 3.4–5.3)
RBC # BLD AUTO: 2.86 10E12/L (ref 4.4–5.9)
SODIUM SERPL-SCNC: 139 MMOL/L (ref 133–144)
SPECIMEN SOURCE: NORMAL
SPECIMEN SOURCE: NORMAL
WBC # BLD AUTO: 0.3 10E9/L (ref 4–11)

## 2019-06-01 PROCEDURE — 87040 BLOOD CULTURE FOR BACTERIA: CPT | Performed by: PHYSICIAN ASSISTANT

## 2019-06-01 PROCEDURE — 25800030 ZZH RX IP 258 OP 636: Performed by: PHYSICIAN ASSISTANT

## 2019-06-01 PROCEDURE — 25000132 ZZH RX MED GY IP 250 OP 250 PS 637: Performed by: PHYSICIAN ASSISTANT

## 2019-06-01 PROCEDURE — 20600000 ZZH R&B BMT

## 2019-06-01 PROCEDURE — 87103 BLOOD FUNGUS CULTURE: CPT | Performed by: PHYSICIAN ASSISTANT

## 2019-06-01 PROCEDURE — 85027 COMPLETE CBC AUTOMATED: CPT

## 2019-06-01 PROCEDURE — 83735 ASSAY OF MAGNESIUM: CPT | Performed by: PHYSICIAN ASSISTANT

## 2019-06-01 PROCEDURE — 80048 BASIC METABOLIC PNL TOTAL CA: CPT | Performed by: PHYSICIAN ASSISTANT

## 2019-06-01 PROCEDURE — 83605 ASSAY OF LACTIC ACID: CPT | Performed by: INTERNAL MEDICINE

## 2019-06-01 PROCEDURE — 25000128 H RX IP 250 OP 636: Performed by: PHYSICIAN ASSISTANT

## 2019-06-01 RX ORDER — BENZOCAINE/MENTHOL 6 MG-10 MG
LOZENGE MUCOUS MEMBRANE 2 TIMES DAILY
Status: DISCONTINUED | OUTPATIENT
Start: 2019-06-01 | End: 2019-06-04

## 2019-06-01 RX ADMIN — CEFEPIME 2 G: 2 INJECTION, POWDER, FOR SOLUTION INTRAVENOUS at 12:09

## 2019-06-01 RX ADMIN — FILGRASTIM 780 MCG: 480 INJECTION, SOLUTION INTRAVENOUS; SUBCUTANEOUS at 21:38

## 2019-06-01 RX ADMIN — Medication 600 MG: at 18:05

## 2019-06-01 RX ADMIN — FLUCONAZOLE 200 MG: 200 TABLET ORAL at 08:12

## 2019-06-01 RX ADMIN — Medication 5 ML: at 08:13

## 2019-06-01 RX ADMIN — LORATADINE 10 MG: 10 TABLET ORAL at 08:12

## 2019-06-01 RX ADMIN — CEFEPIME 2 G: 2 INJECTION, POWDER, FOR SOLUTION INTRAVENOUS at 03:53

## 2019-06-01 RX ADMIN — ACYCLOVIR 800 MG: 800 TABLET ORAL at 08:12

## 2019-06-01 RX ADMIN — ACETAMINOPHEN 650 MG: 325 TABLET, FILM COATED ORAL at 03:37

## 2019-06-01 RX ADMIN — Medication 5 ML: at 23:37

## 2019-06-01 RX ADMIN — ACETAMINOPHEN 650 MG: 325 TABLET, FILM COATED ORAL at 12:09

## 2019-06-01 RX ADMIN — ACYCLOVIR 800 MG: 800 TABLET ORAL at 20:25

## 2019-06-01 RX ADMIN — OMEPRAZOLE 20 MG: 20 CAPSULE, DELAYED RELEASE ORAL at 08:12

## 2019-06-01 RX ADMIN — MELATONIN 2000 UNITS: at 08:12

## 2019-06-01 RX ADMIN — Medication 600 MG: at 08:12

## 2019-06-01 RX ADMIN — Medication 5 ML: at 03:41

## 2019-06-01 RX ADMIN — PAROXETINE HYDROCHLORIDE HEMIHYDRATE 20 MG: 20 TABLET, FILM COATED ORAL at 08:12

## 2019-06-01 RX ADMIN — CEFEPIME 2 G: 2 INJECTION, POWDER, FOR SOLUTION INTRAVENOUS at 20:25

## 2019-06-01 RX ADMIN — ACETAMINOPHEN 650 MG: 325 TABLET, FILM COATED ORAL at 18:05

## 2019-06-01 ASSESSMENT — ACTIVITIES OF DAILY LIVING (ADL)
ADLS_ACUITY_SCORE: 10
ADLS_ACUITY_SCORE: 13
ADLS_ACUITY_SCORE: 10
ADLS_ACUITY_SCORE: 13
ADLS_ACUITY_SCORE: 13
ADLS_ACUITY_SCORE: 10

## 2019-06-01 ASSESSMENT — MIFFLIN-ST. JEOR: SCORE: 1597.79

## 2019-06-01 NOTE — PROVIDER NOTIFICATION
"MD paged at [phone] with pt temp of 100.5    /79 (BP Location: Left arm)   Pulse 93   Temp 100.5  F (38.1  C) (Axillary)   Resp 16   Ht 1.753 m (5' 9\")   Wt 80.5 kg (177 lb 6.4 oz)   SpO2 96%   BMI 26.20 kg/m      "

## 2019-06-01 NOTE — PLAN OF CARE
"/86 (BP Location: Left arm)   Pulse 93   Temp 99.5  F (37.5  C) (Axillary)   Resp 16   Ht 1.753 m (5' 9\")   Wt 80.2 kg (176 lb 14.4 oz)   SpO2 99%   BMI 26.12 kg/m  . Patient had high temp 101.7 ax and received tylenol 650 mg po x 1 with relief, temp 99.5 ax. Charge nurse was notified and pt had blood cultures done this morning and primary team are aware of the fever. Patient has antibodies on scheduled. Central line dressing is C/D/I and Red port is HL. Patient is A & O x 4. No c/o pain or n/v during this shift. Patient is eating and drinking well. Patient walks on the hallway and showered. Urine output is good and bowel movement x 1, normal. Continue to encourage patient to do good mouth cares, cough, deep breath and use the incentive spirometer often. Continue with plan of care and notify MD for status changes.    Problem: Adult Inpatient Plan of Care  Goal: Plan of Care Review  6/1/2019 1413 by Catie Morales RN  Outcome: No Change  Flowsheets (Taken 6/1/2019 1413)  Plan of Care Reviewed With: patient;spouse;caregiver     Problem: Fatigue (Stem Cell/Bone Marrow Transplant)  Goal: Energy Level Supports Daily Activity  6/1/2019 1413 by Catie Morales RN  Outcome: Improving     Problem: Mucositis (Stem Cell/Bone Marrow Transplant)  Goal: Mucous Membrane Health and Integrity  6/1/2019 1413 by Catie Morales RN  Outcome: Improving     Problem: Nausea and Vomiting (Stem Cell/Bone Marrow Transplant)  Goal: Nausea and Vomiting Symptom Relief  6/1/2019 1413 by Catei Morales RN  Outcome: Improving     Problem: Nutrition Intake Altered (Stem Cell/Bone Marrow Transplant)  Goal: Optimal Nutrition Intake  6/1/2019 1413 by Catie Morales RN  Outcome: Improving     "

## 2019-06-01 NOTE — PLAN OF CARE
T-max 100.8, two fevers overnight, both came down with tylenol, blood cultures drawn from both CVC lumen. BP slightly hypertensive, OVSS on RA. Cough continues, tessalon deepthi x1 with good relief before bed. Educated and encouraged on IS use. Denies pain, NVD. Independent in room, voiding adequately. Continue to monitor.      Problem: Adult Inpatient Plan of Care  Goal: Plan of Care Review  6/1/2019 0508 by Sherie Higgins, RN  Outcome: No Change  5/31/2019 7881 by Leslie Cantu, RN  Outcome: No Change

## 2019-06-02 LAB
ANION GAP SERPL CALCULATED.3IONS-SCNC: 5 MMOL/L (ref 3–14)
BACTERIA SPEC CULT: NORMAL
BUN SERPL-MCNC: 11 MG/DL (ref 7–30)
CALCIUM SERPL-MCNC: 8.2 MG/DL (ref 8.5–10.1)
CHLORIDE SERPL-SCNC: 104 MMOL/L (ref 94–109)
CO2 SERPL-SCNC: 27 MMOL/L (ref 20–32)
CREAT SERPL-MCNC: 0.76 MG/DL (ref 0.66–1.25)
DIFFERENTIAL METHOD BLD: ABNORMAL
ERYTHROCYTE [DISTWIDTH] IN BLOOD BY AUTOMATED COUNT: 13.4 % (ref 10–15)
GFR SERPL CREATININE-BSD FRML MDRD: >90 ML/MIN/{1.73_M2}
GLUCOSE SERPL-MCNC: 98 MG/DL (ref 70–99)
HCT VFR BLD AUTO: 26.1 % (ref 40–53)
HGB BLD-MCNC: 8.9 G/DL (ref 13.3–17.7)
Lab: NORMAL
MCH RBC QN AUTO: 30.8 PG (ref 26.5–33)
MCHC RBC AUTO-ENTMCNC: 34.1 G/DL (ref 31.5–36.5)
MCV RBC AUTO: 90 FL (ref 78–100)
PLATELET # BLD AUTO: 19 10E9/L (ref 150–450)
POTASSIUM SERPL-SCNC: 4 MMOL/L (ref 3.4–5.3)
RBC # BLD AUTO: 2.89 10E12/L (ref 4.4–5.9)
SODIUM SERPL-SCNC: 136 MMOL/L (ref 133–144)
SPECIMEN SOURCE: NORMAL
WBC # BLD AUTO: 0.3 10E9/L (ref 4–11)

## 2019-06-02 PROCEDURE — 25000128 H RX IP 250 OP 636: Performed by: PHYSICIAN ASSISTANT

## 2019-06-02 PROCEDURE — 87449 NOS EACH ORGANISM AG IA: CPT | Performed by: STUDENT IN AN ORGANIZED HEALTH CARE EDUCATION/TRAINING PROGRAM

## 2019-06-02 PROCEDURE — 25000132 ZZH RX MED GY IP 250 OP 250 PS 637: Performed by: PHYSICIAN ASSISTANT

## 2019-06-02 PROCEDURE — 25800030 ZZH RX IP 258 OP 636: Performed by: PHYSICIAN ASSISTANT

## 2019-06-02 PROCEDURE — 25000132 ZZH RX MED GY IP 250 OP 250 PS 637: Performed by: INTERNAL MEDICINE

## 2019-06-02 PROCEDURE — 87305 ASPERGILLUS AG IA: CPT | Performed by: STUDENT IN AN ORGANIZED HEALTH CARE EDUCATION/TRAINING PROGRAM

## 2019-06-02 PROCEDURE — 87103 BLOOD FUNGUS CULTURE: CPT | Performed by: PHYSICIAN ASSISTANT

## 2019-06-02 PROCEDURE — 87040 BLOOD CULTURE FOR BACTERIA: CPT | Performed by: PHYSICIAN ASSISTANT

## 2019-06-02 PROCEDURE — 80048 BASIC METABOLIC PNL TOTAL CA: CPT | Performed by: PHYSICIAN ASSISTANT

## 2019-06-02 PROCEDURE — 20600000 ZZH R&B BMT

## 2019-06-02 PROCEDURE — 85027 COMPLETE CBC AUTOMATED: CPT

## 2019-06-02 RX ADMIN — PAROXETINE HYDROCHLORIDE HEMIHYDRATE 20 MG: 20 TABLET, FILM COATED ORAL at 09:05

## 2019-06-02 RX ADMIN — FILGRASTIM 780 MCG: 480 INJECTION, SOLUTION INTRAVENOUS; SUBCUTANEOUS at 21:13

## 2019-06-02 RX ADMIN — CEFEPIME 2 G: 2 INJECTION, POWDER, FOR SOLUTION INTRAVENOUS at 19:34

## 2019-06-02 RX ADMIN — Medication 600 MG: at 17:33

## 2019-06-02 RX ADMIN — ACYCLOVIR 800 MG: 800 TABLET ORAL at 09:05

## 2019-06-02 RX ADMIN — ACETAMINOPHEN 650 MG: 325 TABLET, FILM COATED ORAL at 12:45

## 2019-06-02 RX ADMIN — Medication 5 ML: at 03:16

## 2019-06-02 RX ADMIN — ACETAMINOPHEN 650 MG: 325 TABLET, FILM COATED ORAL at 03:16

## 2019-06-02 RX ADMIN — FLUCONAZOLE 200 MG: 200 TABLET ORAL at 09:05

## 2019-06-02 RX ADMIN — BENZONATATE 100 MG: 100 CAPSULE ORAL at 12:45

## 2019-06-02 RX ADMIN — Medication 5 ML: at 23:12

## 2019-06-02 RX ADMIN — LORATADINE 10 MG: 10 TABLET ORAL at 09:05

## 2019-06-02 RX ADMIN — OMEPRAZOLE 20 MG: 20 CAPSULE, DELAYED RELEASE ORAL at 09:05

## 2019-06-02 RX ADMIN — CEFEPIME 2 G: 2 INJECTION, POWDER, FOR SOLUTION INTRAVENOUS at 12:45

## 2019-06-02 RX ADMIN — Medication 600 MG: at 09:05

## 2019-06-02 RX ADMIN — ACYCLOVIR 800 MG: 800 TABLET ORAL at 19:33

## 2019-06-02 RX ADMIN — MELATONIN 2000 UNITS: at 09:05

## 2019-06-02 RX ADMIN — Medication 10 ML: at 14:15

## 2019-06-02 RX ADMIN — BENZONATATE 100 MG: 100 CAPSULE ORAL at 03:16

## 2019-06-02 RX ADMIN — CEFEPIME 2 G: 2 INJECTION, POWDER, FOR SOLUTION INTRAVENOUS at 03:16

## 2019-06-02 RX ADMIN — BENZONATATE 100 MG: 100 CAPSULE ORAL at 23:08

## 2019-06-02 ASSESSMENT — ACTIVITIES OF DAILY LIVING (ADL)
ADLS_ACUITY_SCORE: 10

## 2019-06-02 ASSESSMENT — MIFFLIN-ST. JEOR: SCORE: 1588.72

## 2019-06-02 NOTE — PROGRESS NOTES
CROSS COVER NOTE    Called for temp 102.    DATE TEMP HR BP RR SPO2 O2   06/02/19 0309 102  F  105 121/81 20 91 % None (Room air)   06/01/19 2331 99.9  F  94 102/78 16 95 % None (Room air)   06/01/19 2009 99.7  F  94 123/73 16 93 % None (Room air)   06/01/19 1755 101.7  F  100 149/89 -- 97 % None (Room air)   06/01/19 1540 99.6  F 94 130/77 16 93 % None (Room air)   06/01/19 1151 101.7  F  103 151/86 16 99 % None (Room air)   06/01/19 0807 98.5  F 101 124/86 16 95 % None (Room air)   06/01/19 0332 100.5  F  89 136/79 16 96 % None (Room air)   05/31/19 2302 100.8  F  94 145/76 94 96 % None (Room air)     CT Sinus (5/29/19):  Inflammatory pansinusitis. Minimal mucoid debris in the left maxillary  sinus seems unlikely to represent acute sinusitis.    Persistent neutropenic fever despite broad-spec antibiotics. Infectious workup has been negative. The only positive signs are pansinusitis on CT and a presenting symptom of blood tinged mucous from his nose which raises concern for possible fungal infection; specifically aspergillus sinusitis [BMT.1993 Sep;12(3):203-8]. Patient is otherwise hemodynamically stable.    PLAN  - check fungitell and galactomannan  - hold off on empiric voriconazole given clinical stability  - if fevers persist, consider ENT consult for nasal endoscopic evaluation     Alfredito Thorpe MD  [facility]

## 2019-06-02 NOTE — PLAN OF CARE
T-max 102.0 this morning. MD notified, cultures drawn from both lumen, gave tylenol, temp decreased to 98.9. Other vital signs stable on RA. New rash generalized throughout back noticed during previous shift. Pt refused hydrocortisone cream in evening as he said it was no longer itching. Cough continues, tessalon deepthi x1 with good relief. Denies pain, NVD. Voiding adequately, independent in room. Continue to monitor.       Problem: Adult Inpatient Plan of Care  Goal: Plan of Care Review  Outcome: No Change

## 2019-06-02 NOTE — PROGRESS NOTES
BMT Daily Progress Note   06/02/2019    Patient ID:  Angel Yanez is a 59 year old male, currently day +16 of his HCT.     Diagnosis MM Multiple myeloma  HCT Type Autologous    Prep Regimen Cytoxan  Melphalan   Donor Source No data was found    GVHD Prophylaxis No  Primary BMT Provider      Angel was admitted on 5/29/2019 for febrile neutropenia.    INTERVAL  HISTORY     Guille feels overall the same.  Still has occasional dry cough.  He has no shortness of breath.  He slept well.  There is no sputum.  Does not have a sore throat.  Does not have diarrhea.  There are no other signs or symptoms.  Wife was with him today.    Review of Systems: 10 point ROS negative except as noted above.  # Pain Assessment:  Current Pain Score 6/2/2019   Patient currently in pain? denies   Pain descriptors -   Angel s pain level was assessed and he currently denies pain.        Scheduled Medications    acyclovir  800 mg Oral BID     calcium carbonate  600 mg Oral BID w/meals     ceFEPIme (MAXIPIME) IV  2 g Intravenous Q8H     filgrastim (NEUPOGEN/GRANIX) intravenous  780 mcg Intravenous Daily at 8 pm     fluconazole  200 mg Oral Daily     heparin lock flush  5-10 mL Intracatheter Q24H     hydrocortisone   Topical BID     loratadine  10 mg Oral Daily     omeprazole  20 mg Oral Daily     PARoxetine  20 mg Oral QAM     vitamin D3  2,000 Units Oral Daily     Current Facility-Administered Medications   Medication     acetaminophen (TYLENOL) tablet 650 mg     acyclovir (ZOVIRAX) tablet 800 mg     benzonatate (TESSALON) capsule 100 mg     calcium carbonate (OS-ZHEN) tablet 600 mg     ceFEPIme (MAXIPIME) 2 g vial to attach to  ml bag for ADULTS or 50 ml bag for PEDS     diphenhydrAMINE (BENADRYL) capsule 25 mg     filgrastim (NEUPOGEN) 780 mcg in D5W 25 mL infusion     fluconazole (DIFLUCAN) tablet 200 mg     heparin lock flush 10 UNIT/ML injection 5-10 mL     heparin lock flush 10 UNIT/ML injection 5-10 mL     hydrocortisone  "(CORTAID) 1 % cream     lidocaine (LMX4) cream     lidocaine 1 % 0.1-1 mL     loratadine (CLARITIN) tablet 10 mg     magnesium sulfate 4 g in 100 mL sterile water (premade)     omeprazole (priLOSEC) CR capsule 20 mg     PARoxetine (PAXIL) tablet 20 mg     potassium chloride (KLOR-CON) Packet 20-40 mEq     potassium chloride 10 mEq in 100 mL intermittent infusion with 10 mg lidocaine     potassium chloride 10 mEq in 100 mL sterile water intermittent infusion (premix)     potassium chloride 20 mEq in 50 mL intermittent infusion     potassium chloride ER (K-DUR/KLOR-CON M) CR tablet 20-40 mEq     potassium phosphate 15 mmol in D5W 250 mL intermittent infusion     potassium phosphate 20 mmol in D5W 250 mL intermittent infusion     potassium phosphate 20 mmol in D5W 500 mL intermittent infusion     potassium phosphate 25 mmol in D5W 500 mL intermittent infusion     prochlorperazine (COMPAZINE) injection 5-10 mg    Or     prochlorperazine (COMPAZINE) tablet 5-10 mg     sodium chloride (PF) 0.9% PF flush 10-20 mL     vitamin D3 (CHOLECALCIFEROL) 1000 units (25 mcg) tablet 2,000 Units       PHYSICAL EXAM     Weight In/Out     Wt Readings from Last 3 Encounters:   06/02/19 79.3 kg (174 lb 14.4 oz)   05/29/19 81.7 kg (180 lb 1.6 oz)   05/28/19 81.1 kg (178 lb 12.8 oz)      I/O last 3 completed shifts:  In: 2265 [P.O.:1950; I.V.:315]  Out: 1775 [Urine:1775]       KPS:  80    /88 (BP Location: Left arm)   Pulse 88   Temp 99.4  F (37.4  C) (Oral)   Resp 18   Ht 1.753 m (5' 9\")   Wt 79.3 kg (174 lb 14.4 oz)   SpO2 94%   BMI 25.83 kg/m   He also had a temperature to 102.5 yesterday night.    General: NAD   Eyes: : FARRAH, sclera anicteric   Nose/Mouth/Throat: OP clear, buccal mucosa moist, no ulcerations   Lungs: CTA bilaterally there were no crackles or wheezes.,   Cardiovascular: RRR, no M/R/G   Abdominal/Rectal: +BS, soft, NT, ND, No HSM   Lymphatics: no edema  Skin: no rashes or petechaie  Neuro: A&O   Additional " Findings: Hsieh site NT, no drainage.    LABS AND IMAGING - PAST 24 HOURS     Results for orders placed or performed during the hospital encounter of 05/29/19 (from the past 24 hour(s))   Lactic acid whole blood   Result Value Ref Range    Lactic Acid 0.6 (L) 0.7 - 2.0 mmol/L   Basic metabolic panel   Result Value Ref Range    Sodium 136 133 - 144 mmol/L    Potassium 4.0 3.4 - 5.3 mmol/L    Chloride 104 94 - 109 mmol/L    Carbon Dioxide 27 20 - 32 mmol/L    Anion Gap 5 3 - 14 mmol/L    Glucose 98 70 - 99 mg/dL    Urea Nitrogen 11 7 - 30 mg/dL    Creatinine 0.76 0.66 - 1.25 mg/dL    GFR Estimate >90 >60 mL/min/[1.73_m2]    GFR Estimate If Black >90 >60 mL/min/[1.73_m2]    Calcium 8.2 (L) 8.5 - 10.1 mg/dL   CBC with platelets differential   Result Value Ref Range    WBC 0.3 (LL) 4.0 - 11.0 10e9/L    RBC Count 2.89 (L) 4.4 - 5.9 10e12/L    Hemoglobin 8.9 (L) 13.3 - 17.7 g/dL    Hematocrit 26.1 (L) 40.0 - 53.0 %    MCV 90 78 - 100 fl    MCH 30.8 26.5 - 33.0 pg    MCHC 34.1 31.5 - 36.5 g/dL    RDW 13.4 10.0 - 15.0 %    Platelet Count 19 (LL) 150 - 450 10e9/L    Diff Method WBC <0.5, Diff not done    Blood culture   Result Value Ref Range    Specimen Description Blood PURPLE PORT     Special Requests Received in aerobic bottle only     Culture Micro No growth after 2 hours    Blood culture   Result Value Ref Range    Specimen Description Blood Red port     Special Requests Received in aerobic bottle only     Culture Micro No growth after 2 hours    Blood culture yeast   Result Value Ref Range    Specimen Description Blood PURPLE PORT     Special Requests Received in aerobic bottle only     Culture Micro No growth after 2 hours    Blood culture yeast   Result Value Ref Range    Specimen Description Blood Red port     Special Requests Received in aerobic bottle only     Culture Micro No growth after 2 hours          OVERALL PLAN   Angel Yanez is a 59 yo man D+16 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     1.  BMT/MM: Slow recovery secondary to low nucleated and CD34 positive cell dose.  Continue double dose G-CSF.  - 5/17 stem cell transplant; cell dose was only 0.639x10^6. ([facility] [place] - known poor cell dose as failed chemo-mobilization 1/2019.)   - Continue GCSF until ANC>2500 x2 consecutive days. -increased filgrastim dose to 10mcg/kg 5/30  - Taking Claritin to alleviate bone pain related to growth factor.     2.  HEME: Keep Hgb>8 and plts>10K.  His white cell count today is again 300.  His platelets were 19 and he will not need a transfusion today.   - Pt has RBC antibodies. Antibody work up completed 5/26.   - Tylenol/Benadryl premeds.     3.  ID: The patient remains to have intermittent fevers.  The highest yesterday was again 102.  Other cultures remain negative and he is nontoxemic.  We will continue to believe that this is secondary to the parainfluenza 2.  We will keep him on cefepime and monitor him for the development of lower respiratory symptoms.    - CMV neg 5/17, pending 5/29  - admitted with febrile neutropenia on 5/26-5/28. W/u ntd.  - proph with HD ACV, Fluconazole   - PJP proph day +28     4.  GI:  No n/v/d.  - mild mucositis. Good oral cares.  - GI prophy- omeprazole.   - LFTs wnl 5/27.     5.  FEN/Renal: Creat, lytes wnl.  Eating well.      6.  Mood:  Cont Paxil    Antwan Erickson

## 2019-06-02 NOTE — PLAN OF CARE
"Tmax: 101.7, OVSS. Denies pain, NVD. Tylenol and ice packs for fever with temp decrease to 99.5. Tessalon x1 for cough. Reports feeling \"drained\" today. Will continue to monitor, following plan of care.     Problem: Adult Inpatient Plan of Care  Goal: Plan of Care Review  6/2/2019 1725 by Leslie Cantu, RN  Outcome: No Change     Problem: Adult Inpatient Plan of Care  Goal: Patient-Specific Goal (Individualization)  6/2/2019 1725 by Leslie Cantu, RN  Outcome: No Change     Problem: Adjustment to Transplant (Stem Cell/Bone Marrow Transplant)  Goal: Optimal Coping with Transplant  6/2/2019 1725 by Leslie Cantu, RN  Outcome: No Change     Problem: Fatigue (Stem Cell/Bone Marrow Transplant)  Goal: Energy Level Supports Daily Activity  6/2/2019 1725 by Leslie Cantu, RN  Outcome: No Change     Problem: Nutrition Intake Altered (Stem Cell/Bone Marrow Transplant)  Goal: Optimal Nutrition Intake  6/2/2019 1725 by Leslie Cantu, RN  Outcome: No Change     "

## 2019-06-02 NOTE — PROVIDER NOTIFICATION
MD was notified that patient has rash(s) on his back, its slightly pinkish and itching. MD will order for benadryl cream. Continue with plan of care and notify MD for status change.

## 2019-06-03 LAB
1,3 BETA GLUCAN SER-MCNC: <31 PG/ML
ABO + RH BLD: ABNORMAL
ABO + RH BLD: ABNORMAL
ALBUMIN SERPL-MCNC: 2.6 G/DL (ref 3.4–5)
ALP SERPL-CCNC: 55 U/L (ref 40–150)
ALT SERPL W P-5'-P-CCNC: 23 U/L (ref 0–70)
ANION GAP SERPL CALCULATED.3IONS-SCNC: 4 MMOL/L (ref 3–14)
AST SERPL W P-5'-P-CCNC: 18 U/L (ref 0–45)
B-D GLUCAN INTERPRETATION (1,3): NEGATIVE
BILIRUB DIRECT SERPL-MCNC: <0.1 MG/DL (ref 0–0.2)
BILIRUB SERPL-MCNC: 0.2 MG/DL (ref 0.2–1.3)
BLD GP AB SCN SERPL QL: ABNORMAL
BLD PROD TYP BPU: ABNORMAL
BLD PROD TYP BPU: NORMAL
BLD UNIT ID BPU: 0
BLOOD BANK CMNT PATIENT-IMP: ABNORMAL
BLOOD BANK CMNT PATIENT-IMP: ABNORMAL
BLOOD PRODUCT CODE: NORMAL
BPU ID: NORMAL
BUN SERPL-MCNC: 12 MG/DL (ref 7–30)
CALCIUM SERPL-MCNC: 7.9 MG/DL (ref 8.5–10.1)
CHLORIDE SERPL-SCNC: 108 MMOL/L (ref 94–109)
CO2 SERPL-SCNC: 28 MMOL/L (ref 20–32)
CREAT SERPL-MCNC: 0.73 MG/DL (ref 0.66–1.25)
DIFFERENTIAL METHOD BLD: ABNORMAL
ERYTHROCYTE [DISTWIDTH] IN BLOOD BY AUTOMATED COUNT: 13.2 % (ref 10–15)
GALACTOMANNAN AG SERPL QL IA: NEGATIVE
GALACTOMANNAN AG SERPL-ACNC: 0.04
GFR SERPL CREATININE-BSD FRML MDRD: >90 ML/MIN/{1.73_M2}
GLUCOSE SERPL-MCNC: 91 MG/DL (ref 70–99)
HCT VFR BLD AUTO: 23.4 % (ref 40–53)
HGB BLD-MCNC: 7.8 G/DL (ref 13.3–17.7)
INR PPP: 1.1 (ref 0.86–1.14)
LACTATE BLD-SCNC: 1 MMOL/L (ref 0.7–2)
MAGNESIUM SERPL-MCNC: 2 MG/DL (ref 1.6–2.3)
MCH RBC QN AUTO: 30.4 PG (ref 26.5–33)
MCHC RBC AUTO-ENTMCNC: 33.3 G/DL (ref 31.5–36.5)
MCV RBC AUTO: 91 FL (ref 78–100)
NUM BPU REQUESTED: 2
PHOSPHATE SERPL-MCNC: 3.1 MG/DL (ref 2.5–4.5)
PLATELET # BLD AUTO: 12 10E9/L (ref 150–450)
POTASSIUM SERPL-SCNC: 3.5 MMOL/L (ref 3.4–5.3)
PROT SERPL-MCNC: 5.7 G/DL (ref 6.8–8.8)
RBC # BLD AUTO: 2.57 10E12/L (ref 4.4–5.9)
SODIUM SERPL-SCNC: 139 MMOL/L (ref 133–144)
SPECIMEN EXP DATE BLD: ABNORMAL
TRANSFUSION STATUS PATIENT QL: NORMAL
TRANSFUSION STATUS PATIENT QL: NORMAL
WBC # BLD AUTO: 0.3 10E9/L (ref 4–11)

## 2019-06-03 PROCEDURE — 80048 BASIC METABOLIC PNL TOTAL CA: CPT | Performed by: PHYSICIAN ASSISTANT

## 2019-06-03 PROCEDURE — 87103 BLOOD FUNGUS CULTURE: CPT | Performed by: PHYSICIAN ASSISTANT

## 2019-06-03 PROCEDURE — 20600000 ZZH R&B BMT

## 2019-06-03 PROCEDURE — 85610 PROTHROMBIN TIME: CPT | Performed by: PHYSICIAN ASSISTANT

## 2019-06-03 PROCEDURE — 25000132 ZZH RX MED GY IP 250 OP 250 PS 637: Performed by: INTERNAL MEDICINE

## 2019-06-03 PROCEDURE — 25000132 ZZH RX MED GY IP 250 OP 250 PS 637: Performed by: PHYSICIAN ASSISTANT

## 2019-06-03 PROCEDURE — 87040 BLOOD CULTURE FOR BACTERIA: CPT | Performed by: PHYSICIAN ASSISTANT

## 2019-06-03 PROCEDURE — 25800030 ZZH RX IP 258 OP 636: Performed by: PHYSICIAN ASSISTANT

## 2019-06-03 PROCEDURE — 84100 ASSAY OF PHOSPHORUS: CPT | Performed by: PHYSICIAN ASSISTANT

## 2019-06-03 PROCEDURE — 83605 ASSAY OF LACTIC ACID: CPT | Performed by: INTERNAL MEDICINE

## 2019-06-03 PROCEDURE — 80076 HEPATIC FUNCTION PANEL: CPT | Performed by: PHYSICIAN ASSISTANT

## 2019-06-03 PROCEDURE — 85027 COMPLETE CBC AUTOMATED: CPT

## 2019-06-03 PROCEDURE — P9040 RBC LEUKOREDUCED IRRADIATED: HCPCS | Performed by: INTERNAL MEDICINE

## 2019-06-03 PROCEDURE — 25000128 H RX IP 250 OP 636: Performed by: PHYSICIAN ASSISTANT

## 2019-06-03 PROCEDURE — 83735 ASSAY OF MAGNESIUM: CPT | Performed by: PHYSICIAN ASSISTANT

## 2019-06-03 RX ADMIN — Medication 5 ML: at 02:53

## 2019-06-03 RX ADMIN — FILGRASTIM 780 MCG: 480 INJECTION, SOLUTION INTRAVENOUS; SUBCUTANEOUS at 19:25

## 2019-06-03 RX ADMIN — OMEPRAZOLE 20 MG: 20 CAPSULE, DELAYED RELEASE ORAL at 08:07

## 2019-06-03 RX ADMIN — Medication 600 MG: at 17:53

## 2019-06-03 RX ADMIN — BENZONATATE 100 MG: 100 CAPSULE ORAL at 08:07

## 2019-06-03 RX ADMIN — CEFEPIME 2 G: 2 INJECTION, POWDER, FOR SOLUTION INTRAVENOUS at 20:09

## 2019-06-03 RX ADMIN — CEFEPIME 2 G: 2 INJECTION, POWDER, FOR SOLUTION INTRAVENOUS at 12:33

## 2019-06-03 RX ADMIN — Medication 600 MG: at 08:07

## 2019-06-03 RX ADMIN — MELATONIN 2000 UNITS: at 08:07

## 2019-06-03 RX ADMIN — BENZONATATE 100 MG: 100 CAPSULE ORAL at 20:09

## 2019-06-03 RX ADMIN — CEFEPIME 2 G: 2 INJECTION, POWDER, FOR SOLUTION INTRAVENOUS at 03:08

## 2019-06-03 RX ADMIN — Medication 5 ML: at 20:10

## 2019-06-03 RX ADMIN — ACETAMINOPHEN 650 MG: 325 TABLET, FILM COATED ORAL at 12:45

## 2019-06-03 RX ADMIN — ACETAMINOPHEN 650 MG: 325 TABLET, FILM COATED ORAL at 06:14

## 2019-06-03 RX ADMIN — LORATADINE 10 MG: 10 TABLET ORAL at 08:07

## 2019-06-03 RX ADMIN — PAROXETINE HYDROCHLORIDE HEMIHYDRATE 20 MG: 20 TABLET, FILM COATED ORAL at 08:07

## 2019-06-03 RX ADMIN — Medication 5 ML: at 00:51

## 2019-06-03 RX ADMIN — ACYCLOVIR 800 MG: 800 TABLET ORAL at 08:07

## 2019-06-03 RX ADMIN — ACETAMINOPHEN 650 MG: 325 TABLET, FILM COATED ORAL at 00:48

## 2019-06-03 RX ADMIN — FLUCONAZOLE 200 MG: 200 TABLET ORAL at 08:08

## 2019-06-03 RX ADMIN — ACYCLOVIR 800 MG: 800 TABLET ORAL at 19:25

## 2019-06-03 RX ADMIN — Medication 5 ML: at 14:29

## 2019-06-03 RX ADMIN — DIPHENHYDRAMINE HYDROCHLORIDE 25 MG: 25 CAPSULE ORAL at 06:14

## 2019-06-03 ASSESSMENT — ACTIVITIES OF DAILY LIVING (ADL)
ADLS_ACUITY_SCORE: 10

## 2019-06-03 ASSESSMENT — MIFFLIN-ST. JEOR: SCORE: 1597.34

## 2019-06-03 NOTE — PLAN OF CARE
T-max 101.3 this morning. Blood cultures drawn from both lumens, temp decreased following tylenol. Cough continues and LS coarse in bases, gave tessalon deepthi x1 with good relief and encouraged IS use. Denies pain, NVD. Voiding adequately. Hgb 7.8, will receive replacement this morning, pre-meds given. Independent in room, continue to monitor.     Problem: Adult Inpatient Plan of Care  Goal: Plan of Care Review  6/3/2019 0613 by Sherie Higgins, RN  Outcome: No Change  6/2/2019 1725 by Leslie Cantu, RN  Outcome: No Change  6/2/2019 1709 by Leslie Cantu, RN  Outcome: No Change

## 2019-06-03 NOTE — PLAN OF CARE
Shift: [id number]  VS: Temp: 99.6  F (37.6  C) Temp src: Oral BP: 120/84 Pulse: 83 Heart Rate: 83 Resp: 16 SpO2: 98 % O2 Device: None (Room air)    Pain: No c/o of pain  Neuro: A&O, neuros intact  Cardiac: wdl   Respiratory: LS clear/fine crackles in bases, dry cough, no SOB, pt using IS regularly  GI/Diet/Appetite: High Kcal/high protein diet, appetite fair  : wnl   LDA's: 2 lumen CVC heparin locked  Skin: Intact ex known rash/irritation on back - hydrocortisone cream available  Activity: Independent  Pertinent Labs/Lab Collection: WBC 0.3, Hgb 7.8 (blood administered overnight), Pl 12    Tmax 99.6, received tylenol x1    Plan: Continue to assess fever and with POC.

## 2019-06-03 NOTE — PROGRESS NOTES
BMT Daily Progress Note   06/03/2019    Patient ID:  Angel Yanez is a 59 year old male, currently day +17 of his HCT.     Diagnosis MM Multiple myeloma  HCT Type Autologous    Prep Regimen Cytoxan  Melphalan   Donor Source No data was found    GVHD Prophylaxis No  Primary BMT Provider      Angel was admitted on 5/29/2019 for febrile neutropenia.    INTERVAL  HISTORY     Guille feels tired from the fevers.  Rash on back is a little itchy but not very bothersome; hasn't yet tried the hydrocortisone cream.  Continues to spike fevers, no new localizing symptoms other than cough and rash.  No bleeding.      Review of Systems: 10 point ROS negative except as noted above.  # Pain Assessment:  Current Pain Score 6/3/2019   Patient currently in pain? denies   Pain descriptors -   Angel s pain level was assessed and he currently denies pain.        Scheduled Medications    acyclovir  800 mg Oral BID     calcium carbonate  600 mg Oral BID w/meals     ceFEPIme (MAXIPIME) IV  2 g Intravenous Q8H     filgrastim (NEUPOGEN/GRANIX) intravenous  780 mcg Intravenous Daily at 8 pm     fluconazole  200 mg Oral Daily     heparin lock flush  5-10 mL Intracatheter Q24H     hydrocortisone   Topical BID     loratadine  10 mg Oral Daily     omeprazole  20 mg Oral Daily     PARoxetine  20 mg Oral QAM     vitamin D3  2,000 Units Oral Daily     Current Facility-Administered Medications   Medication     acetaminophen (TYLENOL) tablet 650 mg     acyclovir (ZOVIRAX) tablet 800 mg     benzonatate (TESSALON) capsule 100 mg     calcium carbonate (OS-ZHEN) tablet 600 mg     ceFEPIme (MAXIPIME) 2 g vial to attach to  ml bag for ADULTS or 50 ml bag for PEDS     diphenhydrAMINE (BENADRYL) capsule 25 mg     filgrastim (NEUPOGEN) 780 mcg in D5W 25 mL infusion     fluconazole (DIFLUCAN) tablet 200 mg     heparin lock flush 10 UNIT/ML injection 5-10 mL     heparin lock flush 10 UNIT/ML injection 5-10 mL     hydrocortisone (CORTAID) 1 % cream      "lidocaine (LMX4) cream     lidocaine 1 % 0.1-1 mL     loratadine (CLARITIN) tablet 10 mg     magnesium sulfate 4 g in 100 mL sterile water (premade)     omeprazole (priLOSEC) CR capsule 20 mg     PARoxetine (PAXIL) tablet 20 mg     potassium chloride (KLOR-CON) Packet 20-40 mEq     potassium chloride 10 mEq in 100 mL intermittent infusion with 10 mg lidocaine     potassium chloride 10 mEq in 100 mL sterile water intermittent infusion (premix)     potassium chloride 20 mEq in 50 mL intermittent infusion     potassium chloride ER (K-DUR/KLOR-CON M) CR tablet 20-40 mEq     potassium phosphate 15 mmol in D5W 250 mL intermittent infusion     potassium phosphate 20 mmol in D5W 250 mL intermittent infusion     potassium phosphate 20 mmol in D5W 500 mL intermittent infusion     potassium phosphate 25 mmol in D5W 500 mL intermittent infusion     prochlorperazine (COMPAZINE) injection 5-10 mg    Or     prochlorperazine (COMPAZINE) tablet 5-10 mg     sodium chloride (PF) 0.9% PF flush 10-20 mL     vitamin D3 (CHOLECALCIFEROL) 1000 units (25 mcg) tablet 2,000 Units       PHYSICAL EXAM     Weight In/Out     Wt Readings from Last 3 Encounters:   06/02/19 79.3 kg (174 lb 14.4 oz)   05/29/19 81.7 kg (180 lb 1.6 oz)   05/28/19 81.1 kg (178 lb 12.8 oz)      I/O last 3 completed shifts:  In: 2646 [P.O.:1996; I.V.:350]  Out: 2025 [Urine:2025]       KPS:  80    /80 (BP Location: Left arm)   Pulse 88   Temp 97.2  F (36.2  C) (Axillary)   Resp 16   Ht 1.753 m (5' 9\")   Wt 79.3 kg (174 lb 14.4 oz)   SpO2 95%   BMI 25.83 kg/m   He also had a temperature to 102.5 yesterday night.    General: NAD   Eyes: : FARRAH, sclera anicteric   Nose/Mouth/Throat: OP clear, buccal mucosa moist, no ulcerations   Lungs: CTA bilaterally there were no crackles or wheezes.,   Cardiovascular: RRR, no M/R/G   Abdominal/Rectal: +BS, soft, NT, ND, No HSM   Lymphatics: no edema  Skin: no rashes or petechaie  Neuro: A&O   Additional Findings: Hsieh site " NT, no drainage.    LABS AND IMAGING - PAST 24 HOURS     Results for orders placed or performed during the hospital encounter of 05/29/19 (from the past 24 hour(s))   Lactic acid level STAT for sepsis protocol   Result Value Ref Range    Lactate for Sepsis Protocol 1.0 0.7 - 2.0 mmol/L   Basic metabolic panel   Result Value Ref Range    Sodium 139 133 - 144 mmol/L    Potassium 3.5 3.4 - 5.3 mmol/L    Chloride 108 94 - 109 mmol/L    Carbon Dioxide 28 20 - 32 mmol/L    Anion Gap 4 3 - 14 mmol/L    Glucose 91 70 - 99 mg/dL    Urea Nitrogen 12 7 - 30 mg/dL    Creatinine 0.73 0.66 - 1.25 mg/dL    GFR Estimate >90 >60 mL/min/[1.73_m2]    GFR Estimate If Black >90 >60 mL/min/[1.73_m2]    Calcium 7.9 (L) 8.5 - 10.1 mg/dL   CBC with platelets differential   Result Value Ref Range    WBC 0.3 (LL) 4.0 - 11.0 10e9/L    RBC Count 2.57 (L) 4.4 - 5.9 10e12/L    Hemoglobin 7.8 (L) 13.3 - 17.7 g/dL    Hematocrit 23.4 (L) 40.0 - 53.0 %    MCV 91 78 - 100 fl    MCH 30.4 26.5 - 33.0 pg    MCHC 33.3 31.5 - 36.5 g/dL    RDW 13.2 10.0 - 15.0 %    Platelet Count 12 (LL) 150 - 450 10e9/L    Diff Method WBC <0.5, Diff not done    INR   Result Value Ref Range    INR 1.10 0.86 - 1.14   Hepatic panel   Result Value Ref Range    Bilirubin Direct <0.1 0.0 - 0.2 mg/dL    Bilirubin Total 0.2 0.2 - 1.3 mg/dL    Albumin 2.6 (L) 3.4 - 5.0 g/dL    Protein Total 5.7 (L) 6.8 - 8.8 g/dL    Alkaline Phosphatase 55 40 - 150 U/L    ALT 23 0 - 70 U/L    AST 18 0 - 45 U/L   Magnesium   Result Value Ref Range    Magnesium 2.0 1.6 - 2.3 mg/dL   Phosphorus   Result Value Ref Range    Phosphorus 3.1 2.5 - 4.5 mg/dL   Blood culture   Result Value Ref Range    Specimen Description Blood PURPLE PORT     Special Requests Received in aerobic bottle only     Culture Micro No growth after 3 hours    Blood culture   Result Value Ref Range    Specimen Description Blood Red port     Special Requests Received in aerobic bottle only     Culture Micro No growth after 3 hours     Blood culture yeast   Result Value Ref Range    Specimen Description Blood PURPLE PORT     Special Requests Received in aerobic bottle only     Culture Micro No growth after 3 hours    Blood culture yeast   Result Value Ref Range    Specimen Description Blood Red port     Special Requests Received in aerobic bottle only     Culture Micro No growth after 3 hours          OVERALL PLAN   Angel Yanez is a 57 yo man D+17 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     1.  BMT/MM: Slow recovery secondary to low nucleated and CD34 positive cell dose.  Continue double dose G-CSF.  - 5/17 stem cell transplant; cell dose was only 0.639x10^6. ([facility] [place] - known poor cell dose as failed chemo-mobilization 1/2019.)   - Continue GCSF until ANC>2500 x2 consecutive days. -increased filgrastim dose to 10mcg/kg 5/30  - Taking Claritin to alleviate bone pain related to growth factor.     2.  HEME: Keep Hgb>8 and plts>10K.   - Pt has RBC antibodies. Antibody work up completed 5/26.   - Tylenol/Benadryl premeds.     3.  ID:   - CMV neg 5/29  - admitted with febrile neutropenia on 5/26-5/28. Notes to have PIV3, which is likely the cause.  Continues on empiric cefepime for febrile neutropenia.    - proph with HD ACV, Fluconazole   - PJP proph day +28     4.  GI:  No n/v/d.  - mild mucositis. Good oral cares.  - GI prophy- omeprazole.   - LFTs wnl 5/27.     5.  FEN/Renal: Creat, lytes wnl.  Eating well.      6.  Mood:  Cont Paxil  7. Rash: follicular rash on back likely relates to fevers, sweating and being on a plastic mattress.  Offered abx cream, but he declines.  Okay to use hydrocortisone cream for itching.   Latrice MAXWELL Carrier  6/3/2019    Attending Note:    The patient was seen and examined by me separate from mid-level provider.   I personally reviewed the today's laboratory results, vital signs and radiology results.  No new issues.  Persistent dry cough.  No shortness of  breath.  Physically active.  Decent oral intake.  I found that vitals are stable and there was no fever. No acute distress. Patient was alert and oriented and grossly neurologically intact. Mouth is clean. Line insertion non-tender and clear. Lungs clear bilateral. Heart regular. Abdomen soft non-tender, BS present. Lower extremity with no edema. No rash.     Main laboratory finding were   Lab Results   Component Value Date    WBC 0.3 (LL) 06/03/2019    ANEU 1.3 (L) 05/23/2019    HGB 7.8 (L) 06/03/2019    HCT 23.4 (L) 06/03/2019    PLT 12 (LL) 06/03/2019     06/03/2019    POTASSIUM 3.5 06/03/2019    CHLORIDE 108 06/03/2019    CO2 28 06/03/2019    GLC 91 06/03/2019    BUN 12 06/03/2019    CR 0.73 06/03/2019    MAG 2.0 06/03/2019    INR 1.10 06/03/2019    BILITOTAL 0.2 06/03/2019    AST 18 06/03/2019    ALT 23 06/03/2019    ALKPHOS 55 06/03/2019    PROTTOTAL 5.7 (L) 06/03/2019    ALBUMIN 2.6 (L) 06/03/2019     My plan is to continue with double dose G-CSF.  He will continue with cefepime.  He was praised and encouraged to remain physically active.  He was encouraged to keep good oral intake.  We discussed plans that could lead to his discharge by the end of the week if his counts continue to improve.  He does not need intravenous nutrition.  We will reassess him tomorrow.    Antwan Erickson M.D.

## 2019-06-04 LAB
ANION GAP SERPL CALCULATED.3IONS-SCNC: 7 MMOL/L (ref 3–14)
BACTERIA SPEC CULT: NO GROWTH
BACTERIA SPEC CULT: NO GROWTH
BLD PROD TYP BPU: NORMAL
BLD PROD TYP BPU: NORMAL
BLD UNIT ID BPU: 0
BLOOD PRODUCT CODE: NORMAL
BPU ID: NORMAL
BUN SERPL-MCNC: 13 MG/DL (ref 7–30)
CALCIUM SERPL-MCNC: 8.4 MG/DL (ref 8.5–10.1)
CHLORIDE SERPL-SCNC: 106 MMOL/L (ref 94–109)
CO2 SERPL-SCNC: 27 MMOL/L (ref 20–32)
CREAT SERPL-MCNC: 0.7 MG/DL (ref 0.66–1.25)
DIFFERENTIAL METHOD BLD: ABNORMAL
ERYTHROCYTE [DISTWIDTH] IN BLOOD BY AUTOMATED COUNT: 14.1 % (ref 10–15)
GFR SERPL CREATININE-BSD FRML MDRD: >90 ML/MIN/{1.73_M2}
GLUCOSE SERPL-MCNC: 87 MG/DL (ref 70–99)
HCT VFR BLD AUTO: 26.6 % (ref 40–53)
HGB BLD-MCNC: 8.9 G/DL (ref 13.3–17.7)
MCH RBC QN AUTO: 29.8 PG (ref 26.5–33)
MCHC RBC AUTO-ENTMCNC: 33.5 G/DL (ref 31.5–36.5)
MCV RBC AUTO: 89 FL (ref 78–100)
NUM BPU REQUESTED: 1
PLATELET # BLD AUTO: 17 10E9/L (ref 150–450)
PLATELET # BLD AUTO: 6 10E9/L (ref 150–450)
POTASSIUM SERPL-SCNC: 3.6 MMOL/L (ref 3.4–5.3)
RBC # BLD AUTO: 2.99 10E12/L (ref 4.4–5.9)
SODIUM SERPL-SCNC: 140 MMOL/L (ref 133–144)
SPECIMEN SOURCE: NORMAL
SPECIMEN SOURCE: NORMAL
TRANSFUSION STATUS PATIENT QL: NORMAL
TRANSFUSION STATUS PATIENT QL: NORMAL
WBC # BLD AUTO: 0.4 10E9/L (ref 4–11)

## 2019-06-04 PROCEDURE — 25000132 ZZH RX MED GY IP 250 OP 250 PS 637: Performed by: INTERNAL MEDICINE

## 2019-06-04 PROCEDURE — 25800030 ZZH RX IP 258 OP 636: Performed by: PHYSICIAN ASSISTANT

## 2019-06-04 PROCEDURE — 25000132 ZZH RX MED GY IP 250 OP 250 PS 637: Performed by: PHYSICIAN ASSISTANT

## 2019-06-04 PROCEDURE — P9037 PLATE PHERES LEUKOREDU IRRAD: HCPCS | Performed by: PHYSICIAN ASSISTANT

## 2019-06-04 PROCEDURE — 86880 COOMBS TEST DIRECT: CPT | Performed by: INTERNAL MEDICINE

## 2019-06-04 PROCEDURE — 86901 BLOOD TYPING SEROLOGIC RH(D): CPT | Performed by: PHYSICIAN ASSISTANT

## 2019-06-04 PROCEDURE — 85027 COMPLETE CBC AUTOMATED: CPT

## 2019-06-04 PROCEDURE — 86970 RBC PRETX INCUBATJ W/CHEMICL: CPT | Performed by: INTERNAL MEDICINE

## 2019-06-04 PROCEDURE — 86900 BLOOD TYPING SEROLOGIC ABO: CPT | Performed by: PHYSICIAN ASSISTANT

## 2019-06-04 PROCEDURE — 86870 RBC ANTIBODY IDENTIFICATION: CPT | Performed by: INTERNAL MEDICINE

## 2019-06-04 PROCEDURE — 80048 BASIC METABOLIC PNL TOTAL CA: CPT | Performed by: PHYSICIAN ASSISTANT

## 2019-06-04 PROCEDURE — 20600000 ZZH R&B BMT

## 2019-06-04 PROCEDURE — 86850 RBC ANTIBODY SCREEN: CPT | Performed by: PHYSICIAN ASSISTANT

## 2019-06-04 PROCEDURE — 25000128 H RX IP 250 OP 636: Performed by: PHYSICIAN ASSISTANT

## 2019-06-04 PROCEDURE — 85049 AUTOMATED PLATELET COUNT: CPT | Performed by: PHYSICIAN ASSISTANT

## 2019-06-04 RX ORDER — BENZOCAINE/MENTHOL 6 MG-10 MG
LOZENGE MUCOUS MEMBRANE 2 TIMES DAILY PRN
Status: DISCONTINUED | OUTPATIENT
Start: 2019-06-04 | End: 2019-06-05 | Stop reason: HOSPADM

## 2019-06-04 RX ADMIN — MELATONIN 2000 UNITS: at 08:30

## 2019-06-04 RX ADMIN — DIPHENHYDRAMINE HYDROCHLORIDE 25 MG: 25 CAPSULE ORAL at 06:40

## 2019-06-04 RX ADMIN — ACETAMINOPHEN 650 MG: 325 TABLET, FILM COATED ORAL at 06:41

## 2019-06-04 RX ADMIN — ACYCLOVIR 800 MG: 800 TABLET ORAL at 08:30

## 2019-06-04 RX ADMIN — FILGRASTIM 780 MCG: 480 INJECTION, SOLUTION INTRAVENOUS; SUBCUTANEOUS at 20:53

## 2019-06-04 RX ADMIN — CEFEPIME 2 G: 2 INJECTION, POWDER, FOR SOLUTION INTRAVENOUS at 19:59

## 2019-06-04 RX ADMIN — ACYCLOVIR 800 MG: 800 TABLET ORAL at 19:59

## 2019-06-04 RX ADMIN — LORATADINE 10 MG: 10 TABLET ORAL at 08:31

## 2019-06-04 RX ADMIN — Medication 600 MG: at 08:31

## 2019-06-04 RX ADMIN — Medication 5 ML: at 08:27

## 2019-06-04 RX ADMIN — BENZONATATE 100 MG: 100 CAPSULE ORAL at 06:48

## 2019-06-04 RX ADMIN — Medication 5 ML: at 08:28

## 2019-06-04 RX ADMIN — Medication 5 ML: at 22:15

## 2019-06-04 RX ADMIN — Medication 5 ML: at 15:35

## 2019-06-04 RX ADMIN — PAROXETINE HYDROCHLORIDE HEMIHYDRATE 20 MG: 20 TABLET, FILM COATED ORAL at 08:31

## 2019-06-04 RX ADMIN — Medication 600 MG: at 17:48

## 2019-06-04 RX ADMIN — OMEPRAZOLE 20 MG: 20 CAPSULE, DELAYED RELEASE ORAL at 08:31

## 2019-06-04 RX ADMIN — HYDROCORTISONE: 1 CREAM TOPICAL at 12:01

## 2019-06-04 RX ADMIN — CEFEPIME 2 G: 2 INJECTION, POWDER, FOR SOLUTION INTRAVENOUS at 04:23

## 2019-06-04 RX ADMIN — FLUCONAZOLE 200 MG: 200 TABLET ORAL at 08:31

## 2019-06-04 RX ADMIN — CEFEPIME 2 G: 2 INJECTION, POWDER, FOR SOLUTION INTRAVENOUS at 11:57

## 2019-06-04 ASSESSMENT — ACTIVITIES OF DAILY LIVING (ADL)
ADLS_ACUITY_SCORE: 10

## 2019-06-04 ASSESSMENT — MIFFLIN-ST. JEOR: SCORE: 1593.26

## 2019-06-04 NOTE — PLAN OF CARE
Patient slept at intervals. Intermittent coughing episodes. Reported coughing up bloody sputum in the morning. He had declined Tessalon overnight, but took a dose in the morning. Temp max 99.2 AX. Vitals stable. Sats ok on room air. Antibiotic coverage with Cefepime. Denied pain/nausea. Platelets transfused in the morning, with usual pre-meds of Tylenol and Benadryl. Patient was heplocked first half of shift. Up independently in room. Voiding ok; yellow urine.

## 2019-06-04 NOTE — PLAN OF CARE
Problem: Adult Inpatient Plan of Care  Goal: Plan of Care Review  Outcome: No Change     Problem: Adult Inpatient Plan of Care  Goal: Plan of Care Review  Outcome: No Change     Problem: Fatigue (Stem Cell/Bone Marrow Transplant)  Goal: Energy Level Supports Daily Activity  Outcome: No Change     Problem: Fatigue (Stem Cell/Bone Marrow Transplant)  Goal: Energy Level Supports Daily Activity  Outcome: No Change     Problem: Nutrition Intake Altered (Stem Cell/Bone Marrow Transplant)  Goal: Optimal Nutrition Intake  Outcome: No Change     Problem: Infection Risk (Fever with Neutropenia)  Goal: Absence of Infection  Outcome: No Change   Patient has no complaints today.  He is eating well.  He showered independently.  He walked the halls and has been alert all day. RENALDO

## 2019-06-04 NOTE — PROGRESS NOTES
BMT Daily Progress Note   06/04/2019    Patient ID:  Angel Yanez is a 59 year old male, currently day +18 of his HCT.     Diagnosis MM Multiple myeloma  HCT Type Autologous    Prep Regimen Cytoxan  Melphalan   Donor Source No data was found    GVHD Prophylaxis No  Primary BMT Provider      Angel was admitted on 5/29/2019 for febrile neutropenia.    INTERVAL  HISTORY     No feverspikes for the last 24 hours. Jzab=809.0. Rash on back is better without hydrocortisone cream.  Continues with cough with blood tinged sputum due to low platelets.and rash.  No other bleeding.  No n,v,d.  NO constipation.  Eating and drinking.  WBC=0.4 today.    Review of Systems: 10 point ROS negative except as noted above.  # Pain Assessment:  Current Pain Score 6/4/2019   Patient currently in pain? denies   Pain descriptors -   Angel rebollar pain level was assessed and he currently denies pain.        Scheduled Medications    acyclovir  800 mg Oral BID     calcium carbonate  600 mg Oral BID w/meals     ceFEPIme (MAXIPIME) IV  2 g Intravenous Q8H     filgrastim (NEUPOGEN/GRANIX) intravenous  780 mcg Intravenous Daily at 8 pm     fluconazole  200 mg Oral Daily     heparin lock flush  5-10 mL Intracatheter Q24H     hydrocortisone   Topical BID     loratadine  10 mg Oral Daily     omeprazole  20 mg Oral Daily     PARoxetine  20 mg Oral QAM     vitamin D3  2,000 Units Oral Daily     Current Facility-Administered Medications   Medication     acetaminophen (TYLENOL) tablet 650 mg     acyclovir (ZOVIRAX) tablet 800 mg     benzonatate (TESSALON) capsule 100 mg     calcium carbonate (OS-ZHEN) tablet 600 mg     ceFEPIme (MAXIPIME) 2 g vial to attach to  ml bag for ADULTS or 50 ml bag for PEDS     diphenhydrAMINE (BENADRYL) capsule 25 mg     filgrastim (NEUPOGEN) 780 mcg in D5W 25 mL infusion     fluconazole (DIFLUCAN) tablet 200 mg     heparin lock flush 10 UNIT/ML injection 5-10 mL     heparin lock flush 10 UNIT/ML injection 5-10 mL      "hydrocortisone (CORTAID) 1 % cream     lidocaine (LMX4) cream     lidocaine 1 % 0.1-1 mL     loratadine (CLARITIN) tablet 10 mg     magnesium sulfate 4 g in 100 mL sterile water (premade)     omeprazole (priLOSEC) CR capsule 20 mg     PARoxetine (PAXIL) tablet 20 mg     potassium chloride (KLOR-CON) Packet 20-40 mEq     potassium chloride 10 mEq in 100 mL intermittent infusion with 10 mg lidocaine     potassium chloride 10 mEq in 100 mL sterile water intermittent infusion (premix)     potassium chloride 20 mEq in 50 mL intermittent infusion     potassium chloride ER (K-DUR/KLOR-CON M) CR tablet 20-40 mEq     potassium phosphate 15 mmol in D5W 250 mL intermittent infusion     potassium phosphate 20 mmol in D5W 250 mL intermittent infusion     potassium phosphate 20 mmol in D5W 500 mL intermittent infusion     potassium phosphate 25 mmol in D5W 500 mL intermittent infusion     prochlorperazine (COMPAZINE) injection 5-10 mg    Or     prochlorperazine (COMPAZINE) tablet 5-10 mg     sodium chloride (PF) 0.9% PF flush 10-20 mL     vitamin D3 (CHOLECALCIFEROL) 1000 units (25 mcg) tablet 2,000 Units       PHYSICAL EXAM     Weight In/Out     Wt Readings from Last 3 Encounters:   06/04/19 79.8 kg (175 lb 14.4 oz)   05/29/19 81.7 kg (180 lb 1.6 oz)   05/28/19 81.1 kg (178 lb 12.8 oz)      I/O last 3 completed shifts:  In: 1185 [P.O.:500; I.V.:385]  Out: 1400 [Urine:1400]           /85 (BP Location: Left arm)   Pulse 85   Temp 98.8  F (37.1  C) (Oral)   Resp 18   Ht 1.753 m (5' 9\")   Wt 79.8 kg (175 lb 14.4 oz)   SpO2 98%   BMI 25.98 kg/m   He also had a temperature to 102.5 yesterday night.    General: NAD   Eyes: : FARRAH, sclera anicteric   Nose/Mouth/Throat: OP clear, buccal mucosa moist, no ulcerations   Lungs: CTA bilaterally there were no crackles or wheezes.,   Cardiovascular: RRR, no M/R/G   Abdominal/Rectal: +BS, soft, NT, ND, No HSM   Lymphatics: no edema  Skin: no rashes or petechaie  Neuro: A&O "   Additional Findings: Hsieh site NT, no drainage.    LABS AND IMAGING - PAST 24 HOURS     Results for orders placed or performed during the hospital encounter of 05/29/19 (from the past 24 hour(s))   Basic metabolic panel   Result Value Ref Range    Sodium 140 133 - 144 mmol/L    Potassium 3.6 3.4 - 5.3 mmol/L    Chloride 106 94 - 109 mmol/L    Carbon Dioxide 27 20 - 32 mmol/L    Anion Gap 7 3 - 14 mmol/L    Glucose 87 70 - 99 mg/dL    Urea Nitrogen 13 7 - 30 mg/dL    Creatinine 0.70 0.66 - 1.25 mg/dL    GFR Estimate >90 >60 mL/min/[1.73_m2]    GFR Estimate If Black >90 >60 mL/min/[1.73_m2]    Calcium 8.4 (L) 8.5 - 10.1 mg/dL   CBC with platelets differential   Result Value Ref Range    WBC 0.4 (LL) 4.0 - 11.0 10e9/L    RBC Count 2.99 (L) 4.4 - 5.9 10e12/L    Hemoglobin 8.9 (L) 13.3 - 17.7 g/dL    Hematocrit 26.6 (L) 40.0 - 53.0 %    MCV 89 78 - 100 fl    MCH 29.8 26.5 - 33.0 pg    MCHC 33.5 31.5 - 36.5 g/dL    RDW 14.1 10.0 - 15.0 %    Platelet Count 6 (LL) 150 - 450 10e9/L    Diff Method WBC <0.5, Diff not done    ABO/Rh type and screen   Result Value Ref Range    ABO O     RH(D) Pos     Antibody Screen Pos (A)     Test Valid Only At          [facility]    Specimen Expires 06/07/2019     Blood Bank Comment       Delay in availability of Red Cells called to PCU by  PERNE[facility], 6/04/19 0906 TO CYRIL AZUL ON 5C.  Patient given HAILEY on 4/19/19 which is known to cause positive antibody screens. Unable to   rule out clinically significant antibodies, Antibody workup to be sent to [facility], 6/04/19, [facility].     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     1.  BMT/MM: Slow recovery secondary to low nucleated and CD34 positive cell dose.  Continue double dose G-CSF.  - 5/17 stem cell transplant; cell dose was only 0.639x10^6. ([facility] [place] - known poor cell dose as failed chemo-mobilization 1/2019.)   - Continue GCSF until ANC>2500 x2 consecutive days. -increased filgrastim dose to 10mcg/kg 5/30  - Taking Claritin to alleviate bone pain related to growth factor.     2.  HEME: Keep Hgb>8 and plts>10K.   - Pt has RBC antibodies. Antibody work up completed 5/26.   - Tylenol/Benadryl premeds.  Remains neutropenic.  Platelets this morning and recheck plt at 16:00 due to blood tinged sputum today.   3.  ID: xhmn=002.0  - CMV neg 5/29  - admitted with febrile neutropenia on 5/26-5/28. Notes to have PIV3, which is likely the cause.  Continues on empiric cefepime for febrile neutropenia.    - proph with HD ACV, Fluconazole   - PJP proph day +28     4.  GI:  No n/v/d.  - mild mucositis. Good oral cares.Mostly resolved  - GI prophy- omeprazole.   - LFTs wnl 5/27.     5.  FEN/Renal: Creat, lytes wnl.  Eating well.      6.  Mood:  Cont Paxil  7. Rash: follicular rash on back likely relates to fevers, sweating and being on a plastic mattress.  Offered abx cream, but he declines.  Okay to use hydrocortisone cream for itching but he declines.   Funmi Ospina PA-c  6/4/2019    Attending Note:    The patient was seen and examined by me separate from mid-level provider.   I personally reviewed the today's laboratory results, vital signs and radiology results.  No new complaints.  He still has the dry cough.  He had blood-streaked sputum this morning.  It was time 1, and a small amount.  I found that vitals are stable and there was no fever. No acute distress. Patient was alert and oriented and grossly  neurologically intact. Mouth is clean. Line insertion non-tender and clear. Lungs clear bilateral. Heart regular. Abdomen soft non-tender, BS present. Lower extremity with no edema. No rash.     Main laboratory finding were   Lab Results   Component Value Date    WBC 0.4 (LL) 06/04/2019    ANEU 1.3 (L) 05/23/2019    HGB 8.9 (L) 06/04/2019    HCT 26.6 (L) 06/04/2019    PLT 6 (LL) 06/04/2019     06/04/2019    POTASSIUM 3.6 06/04/2019    CHLORIDE 106 06/04/2019    CO2 27 06/04/2019    GLC 87 06/04/2019    BUN 13 06/04/2019    CR 0.70 06/04/2019    MAG 2.0 06/03/2019    INR 1.10 06/03/2019    BILITOTAL 0.2 06/03/2019    AST 18 06/03/2019    ALT 23 06/03/2019    ALKPHOS 55 06/03/2019    PROTTOTAL 5.7 (L) 06/03/2019    ALBUMIN 2.6 (L) 06/03/2019     My plan is to continue with double dose G-CSF.  He will continue with cefepime.  He was praised and encouraged to remain physically active.  With improving white cell count and no fevers we may be able to discharge him by the end of this week.  He was encouraged to keep good oral intake.  Antwan Erickson M.D.

## 2019-06-04 NOTE — PLAN OF CARE
Tmax 100F, resolved w/out intervention. OVSS on RA. Fine crackles in RLL, otherwise CTA. Dry, nonproductive cough, tessalon x 1. Denies pain, N/V/D/C. Appetite fair. Good UOP. Acne like rash on shoulder w/out itching or progression, declines hydrocort cream. Will continue to monitor per POC.    Problem: Adult Inpatient Plan of Care  Goal: Plan of Care Review  Outcome: Improving     Problem: Adult Inpatient Plan of Care  Goal: Absence of Hospital-Acquired Illness or Injury  Outcome: Improving     Problem: Adult Inpatient Plan of Care  Goal: Optimal Comfort and Wellbeing  Outcome: Improving     Problem: Adult Inpatient Plan of Care  Goal: Readiness for Transition of Care  Outcome: Improving     Problem: Adjustment to Transplant (Stem Cell/Bone Marrow Transplant)  Goal: Optimal Coping with Transplant  Outcome: Improving     Problem: Fatigue (Stem Cell/Bone Marrow Transplant)  Goal: Energy Level Supports Daily Activity  Outcome: Improving     Problem: Mucositis (Stem Cell/Bone Marrow Transplant)  Goal: Mucous Membrane Health and Integrity  Outcome: Improving     Problem: Nausea and Vomiting (Stem Cell/Bone Marrow Transplant)  Goal: Nausea and Vomiting Symptom Relief  Outcome: Improving     Problem: Nutrition Intake Altered (Stem Cell/Bone Marrow Transplant)  Goal: Optimal Nutrition Intake  Outcome: Improving     Problem: Fever (Fever with Neutropenia)  Goal: Baseline Body Temperature  Outcome: Improving     Problem: Infection Risk (Fever with Neutropenia)  Goal: Absence of Infection  Outcome: Improving

## 2019-06-05 VITALS
WEIGHT: 175.9 LBS | TEMPERATURE: 97.7 F | OXYGEN SATURATION: 96 % | DIASTOLIC BLOOD PRESSURE: 82 MMHG | RESPIRATION RATE: 16 BRPM | HEIGHT: 69 IN | SYSTOLIC BLOOD PRESSURE: 127 MMHG | BODY MASS INDEX: 26.05 KG/M2 | HEART RATE: 94 BPM

## 2019-06-05 LAB
ANION GAP SERPL CALCULATED.3IONS-SCNC: 6 MMOL/L (ref 3–14)
BACTERIA SPEC CULT: NO GROWTH
BACTERIA SPEC CULT: NO GROWTH
BASOPHILS # BLD AUTO: 0 10E9/L (ref 0–0.2)
BASOPHILS NFR BLD AUTO: 1 %
BLD PROD TYP BPU: NORMAL
BLD PROD TYP BPU: NORMAL
BLD UNIT ID BPU: 0
BLOOD PRODUCT CODE: NORMAL
BPU ID: NORMAL
BUN SERPL-MCNC: 16 MG/DL (ref 7–30)
CALCIUM SERPL-MCNC: 8.3 MG/DL (ref 8.5–10.1)
CHLORIDE SERPL-SCNC: 109 MMOL/L (ref 94–109)
CO2 SERPL-SCNC: 28 MMOL/L (ref 20–32)
CREAT SERPL-MCNC: 0.7 MG/DL (ref 0.66–1.25)
DIFFERENTIAL METHOD BLD: ABNORMAL
EOSINOPHIL # BLD AUTO: 0 10E9/L (ref 0–0.7)
EOSINOPHIL NFR BLD AUTO: 0 %
ERYTHROCYTE [DISTWIDTH] IN BLOOD BY AUTOMATED COUNT: 13.5 % (ref 10–15)
GFR SERPL CREATININE-BSD FRML MDRD: >90 ML/MIN/{1.73_M2}
GLUCOSE SERPL-MCNC: 90 MG/DL (ref 70–99)
HCT VFR BLD AUTO: 26.5 % (ref 40–53)
HGB BLD-MCNC: 8.9 G/DL (ref 13.3–17.7)
LACTATE BLD-SCNC: 0.9 MMOL/L (ref 0.7–2)
LYMPHOCYTES # BLD AUTO: 0.2 10E9/L (ref 0.8–5.3)
LYMPHOCYTES NFR BLD AUTO: 26.5 %
Lab: NORMAL
Lab: NORMAL
MCH RBC QN AUTO: 30.1 PG (ref 26.5–33)
MCHC RBC AUTO-ENTMCNC: 33.6 G/DL (ref 31.5–36.5)
MCV RBC AUTO: 90 FL (ref 78–100)
MONOCYTES # BLD AUTO: 0.1 10E9/L (ref 0–1.3)
MONOCYTES NFR BLD AUTO: 8.8 %
NEUTROPHILS # BLD AUTO: 0.4 10E9/L (ref 1.6–8.3)
NEUTROPHILS NFR BLD AUTO: 63.7 %
NRBC # BLD AUTO: 0 10*3/UL
NRBC BLD AUTO-RTO: 1 /100
NUM BPU REQUESTED: 1
OVALOCYTES BLD QL SMEAR: SLIGHT
PLATELET # BLD AUTO: 14 10E9/L (ref 150–450)
POIKILOCYTOSIS BLD QL SMEAR: SLIGHT
POTASSIUM SERPL-SCNC: 3.7 MMOL/L (ref 3.4–5.3)
RBC # BLD AUTO: 2.96 10E12/L (ref 4.4–5.9)
SODIUM SERPL-SCNC: 142 MMOL/L (ref 133–144)
SPECIMEN SOURCE: NORMAL
SPECIMEN SOURCE: NORMAL
TRANSFUSION STATUS PATIENT QL: NORMAL
TRANSFUSION STATUS PATIENT QL: NORMAL
WBC # BLD AUTO: 0.6 10E9/L (ref 4–11)

## 2019-06-05 PROCEDURE — 85025 COMPLETE CBC W/AUTO DIFF WBC: CPT | Performed by: PHYSICIAN ASSISTANT

## 2019-06-05 PROCEDURE — 25000128 H RX IP 250 OP 636: Performed by: PHYSICIAN ASSISTANT

## 2019-06-05 PROCEDURE — 83605 ASSAY OF LACTIC ACID: CPT | Performed by: INTERNAL MEDICINE

## 2019-06-05 PROCEDURE — 25000132 ZZH RX MED GY IP 250 OP 250 PS 637: Performed by: PHYSICIAN ASSISTANT

## 2019-06-05 PROCEDURE — 80048 BASIC METABOLIC PNL TOTAL CA: CPT | Performed by: PHYSICIAN ASSISTANT

## 2019-06-05 PROCEDURE — P9037 PLATE PHERES LEUKOREDU IRRAD: HCPCS | Performed by: PHYSICIAN ASSISTANT

## 2019-06-05 RX ORDER — LEVOFLOXACIN 250 MG/1
250 TABLET, FILM COATED ORAL DAILY
Status: DISCONTINUED | OUTPATIENT
Start: 2019-06-05 | End: 2019-06-05

## 2019-06-05 RX ORDER — DIPHENHYDRAMINE HCL 25 MG
25 CAPSULE ORAL EVERY 6 HOURS PRN
Status: CANCELLED
Start: 2019-06-05

## 2019-06-05 RX ORDER — AZITHROMYCIN 250 MG/1
TABLET, FILM COATED ORAL
Qty: 60 TABLET | Refills: 0 | Status: SHIPPED | OUTPATIENT
Start: 2019-06-05 | End: 2019-06-24

## 2019-06-05 RX ORDER — CEFPODOXIME PROXETIL 200 MG/1
200 TABLET, FILM COATED ORAL 2 TIMES DAILY
Status: DISCONTINUED | OUTPATIENT
Start: 2019-06-05 | End: 2019-06-05 | Stop reason: HOSPADM

## 2019-06-05 RX ORDER — CEFPODOXIME PROXETIL 200 MG/1
200 TABLET, FILM COATED ORAL 2 TIMES DAILY
Qty: 30 TABLET | Refills: 0 | Status: SHIPPED | OUTPATIENT
Start: 2019-06-05 | End: 2019-06-07

## 2019-06-05 RX ORDER — ACETAMINOPHEN 325 MG/1
650 TABLET ORAL EVERY 4 HOURS PRN
Status: CANCELLED
Start: 2019-06-05

## 2019-06-05 RX ADMIN — OMEPRAZOLE 20 MG: 20 CAPSULE, DELAYED RELEASE ORAL at 08:15

## 2019-06-05 RX ADMIN — PAROXETINE HYDROCHLORIDE HEMIHYDRATE 20 MG: 20 TABLET, FILM COATED ORAL at 08:15

## 2019-06-05 RX ADMIN — Medication 600 MG: at 08:15

## 2019-06-05 RX ADMIN — DIPHENHYDRAMINE HYDROCHLORIDE 25 MG: 25 CAPSULE ORAL at 10:32

## 2019-06-05 RX ADMIN — Medication 5 ML: at 12:56

## 2019-06-05 RX ADMIN — ACETAMINOPHEN 650 MG: 325 TABLET, FILM COATED ORAL at 10:32

## 2019-06-05 RX ADMIN — Medication 5 ML: at 08:16

## 2019-06-05 RX ADMIN — LORATADINE 10 MG: 10 TABLET ORAL at 08:15

## 2019-06-05 RX ADMIN — CEFPODOXIME PROXETIL 200 MG: 200 TABLET, FILM COATED ORAL at 11:21

## 2019-06-05 RX ADMIN — ACYCLOVIR 800 MG: 800 TABLET ORAL at 08:15

## 2019-06-05 RX ADMIN — MELATONIN 2000 UNITS: at 08:15

## 2019-06-05 RX ADMIN — CEFEPIME 2 G: 2 INJECTION, POWDER, FOR SOLUTION INTRAVENOUS at 04:13

## 2019-06-05 RX ADMIN — FLUCONAZOLE 200 MG: 200 TABLET ORAL at 08:15

## 2019-06-05 ASSESSMENT — ACTIVITIES OF DAILY LIVING (ADL)
ADLS_ACUITY_SCORE: 10

## 2019-06-05 NOTE — PLAN OF CARE
AVSS. Guille offered no complaints overnight. No replacements needed this morning. Voiding well. Independent in room. Hoping to discharge today.     Problem: Adult Inpatient Plan of Care  Goal: Plan of Care Review  6/5/2019 0638 by Brea Panda, RN  Outcome: Improving     Problem: Adult Inpatient Plan of Care  Goal: Optimal Comfort and Wellbeing  Outcome: Improving     Problem: Adjustment to Transplant (Stem Cell/Bone Marrow Transplant)  Goal: Optimal Coping with Transplant  Outcome: Improving     Problem: Fever (Fever with Neutropenia)  Goal: Baseline Body Temperature  Outcome: Improving     Problem: Infection Risk (Fever with Neutropenia)  Goal: Absence of Infection  6/5/2019 0638 by Brea Panda, RN  Outcome: Improving

## 2019-06-05 NOTE — DISCHARGE SUMMARY
[facility] Discharge Summary   Angel Yanez MRN# [id number]   Age: 59 year old  YOB: 1960   Date of Admission: 5/29/2019  Date of Discharge:  6/5/2019  Admitting Physician: Antwan Erickson MD  Discharge Physician:  Adebayo Lucio MD  Discharge Diagnoses:    1. Neutropenic fever  2. Parainfluenza 3 respiratory infection  3. S/p autologous PBSCT for multiple myeloma  4. Pancytopenia to due #3  Discharge Medications:       Guille Yanez   Home Medication Instructions [id number]    Printed on:06/05/19 1127   Medication Information                      acyclovir (ZOVIRAX) 800 MG tablet  Take 1 tablet (800 mg) by mouth 2 times daily             Azithromycin 250mg tablets  - Take one tablet (250mg) daily through engraftment              calcium carbonate (CALCIUM CARBONATE) 600 MG tablet  Take 2 tablets by mouth daily              Cholecalciferol (VITAMIN D3) 2000 units CAPS  Take 2,000 Units by mouth daily              fluconazole (DIFLUCAN) 200 MG tablet  Take 1 tablet (200 mg) by mouth daily             loratadine (CLARITIN) 10 MG tablet  Take 10 mg by mouth daily             omeprazole (PRILOSEC OTC) 20 MG tablet  Take 20 mg by mouth daily              PARoxetine (PAXIL) 20 MG tablet  Take 20 mg by mouth every morning             prochlorperazine (COMPAZINE) 5 MG tablet  Take 1-2 tablets (5-10 mg) by mouth every 6 hours as needed for nausea or vomiting             sulfamethoxazole-trimethoprim (BACTRIM DS/SEPTRA DS) 800-160 MG tablet  Take 1 tablet by mouth Every Mon, Tues two times daily DO NOT START UNTIL DIRECTED BY BMT STAFF               Brief History of Illness:    **Adopted from H&P  Angel Yanez is a 58 year old male day +12 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 He was discharged D+1 after transplant and was followed in BMT Clinic. He was just admitted for neutropenic fever 5/26-5/28 with negative work-up, and discharged on daily Rocephin. Presented to clinic today  with a temp 101. Deferveced with Tylenol. Received Rocephin and Vanco prior to readmission today for recurrent neutropenic fevers. Denies cough or congestion. Has a sore in his throat that is better today, nothing new. No N/V.  No diarrhea or constipation.  Some blood tinged mucous from his nose last night. No rash. No dysuria.    Hospital Course:    Summary: Guille was readmitted to the hospital post transplant for neutropenic fevers. He was treated empirically with cefepime. Infectious workup showed parainfluenza 3 respiratory infection, but the rest of the workup was negative. He is now afebrile. His white count is starting to rise on GCSF. He continues to have a cough and hemoptysis x 2 isolated episodes. We are transfusing platelets prior to discharge today. Otherwise he is feeling well. Denies mouth sore, nausea or diarrhea. He is eating well. He will follow up in clinic daily for labs, infusions and GCSF. See additional details below.    1.  BMT/MM: Slow recovery secondary to low nucleated and CD34 positive cell dose.  Continue GCSF  - 5/17 stem cell transplant; cell dose was only 0.639x10^6. ([facility] [place] - known poor cell dose as failed chemo-mobilization 1/2019.)   - Continue GCSF until ANC>2500 x2 consecutive days. Increased filgrastim dose to 10mcg/kg 5/30. WBC up to 0.6, ANC 0.4.   - Per staff, decrease back to single dose daily as he is starting to engraft. Give IV GCSF 780mcg prior to discharge (already made) and start subcutaneous 480mcg in clinic tomrorow  - Taking Claritin to alleviate bone pain related to growth factor.     2.  HEME: Keep Hgb>8 and plts>20K (hemoptysis). Tylenol/Benadryl premeds.  - Pt has RBC antibodies. Antibody work up completed 5/26.   - hemoptysis 6/4 and 6/5. Give plts today before discharge. Made appts with infusion for plts on 6/6 and 6/7     3.  ID: afebrile  - admitted with febrile neutropenia on 5/26-5/28. Notes to have PIV3, which is likely the cause. Change cefepime  to azithro prophy through engraftment (moxifloxacin allergy)  - proph with HD ACV, Fluconazole   - PJP proph day +28 - has at home, but knows not to start yet  - CMV neg 5/29     4.  GI:  No n/v/d.  - mild mucositis resolved  - GI prophy- omeprazole.   - LFTs wnl 5/27.     5.  FEN/Renal: Creat, lytes wnl.  Eating well.      6.  Mood:  Cont Paxil     7. Rash: follicular rash on back likely relates to fevers, sweating and being on a plastic mattress.  Now mostly resolved.    CODE STATUS:   Discharge Instructions and Follow-Up:    Discharge diet: Regular diet as tolerated  Discharge activity: Activity as tolerated   Discharge follow-up: Follow up with BMT Clinic as follows:  1. Thursday, 6/6 check in at 1:15pm for labs, 1:45pm provider visit and infusion appt for platelets  2. Friday, 6/7 check in at 11:45am for labs, 12:30 for provider visit and infusion appt for platelets  Discharge Disposition:    Discharged to home.    Alejandra Adorno PA-C  [phone]

## 2019-06-05 NOTE — SUMMARY OF CARE
BMT Summary of Care    This note has data from a flowsheet    June 5, 2019 10:38 AM  Angel Yanez  MRN: [id number]    Discharge Date: 6/5/19    BMT Primary Physician: Dr. Lucio    BMT Nurse Coordinator: Su Pate    Discharge Diagnosis: S/P readmission for febrile neutropenia    Discharge To: Home    Activity: As tolerated    Catheter Care: Hsieh - Flush each line with 5mL of 10 unit/mL heparin every 24 hours    Vascular Access Device Protocol Per Policy  Supplies through home infusion  [facility]  Fax: [phone]  Ph: [phone]       IV Medications through home infusion: None    Nutrition: Regular diet as tolerated    Blood Transfusions:  Transfuse if Hemoglobin < or equal 8 mg/dL  Red Blood Cell Order: 2 units, irradiated and leukoreduced   Transfuse if Platelet count < or equal 20,000 uL (hemoptysis)  Platelet order: 1 adult dose, irradiated and leukoreduced  Transfusion Pre-meds:  Benadryl  25-50mg PO x 1 pre-transfusion   Tylenol 650 mg PO x 1 pre-transfusion     Intravenous Electrolyte Replacement:  Potassium  chloride (give only if serum creatinine < 2)     3-3.3  20mEq/hr  over 1 hour x 2 doses     <3      20mEq/hr  over 1 hour x 3 doses  Magnesium sulfate 1.3-1.7     2 grams over 1 hour x1 dose                                     <1.3         2 grams over 2 hours x1 dose    Outpatient Pharmacy:  G-CSF to be given in clinic: (dose) 480mcg daily through engraftment    Laboratory Tests:  At next clinic appointment (date: 6/6/19)  Hemogram (CBC) differential, platelet count  Basic Metabolic Panel    Support Services:  None    Appointments:   BMT Clinic (date, time, provider):   1. Thursday, 6/6 check in at 1:15pm for labs, 1:45pm provider visit and infusion appt for platelets  2. Friday, 6/7 check in at 11:45am for labs, 12:30 for provider visit and infusion appt for platelets      Alejandra Adorno PA-C

## 2019-06-05 NOTE — PROGRESS NOTES
"BMT Daily Progress Note   06/05/2019    Patient ID:  Angel Yanez is a 59 year old male, currently day +19 of his HCT.     Diagnosis MM Multiple myeloma  HCT Type Autologous    Prep Regimen Cytoxan  Melphalan   Donor Source No data was found    GVHD Prophylaxis No  Primary BMT Provider      Angel was admitted on 5/29/2019 for febrile neutropenia.    INTERVAL  HISTORY     Afebrile overnight. Sitting up in bed. Ongoing mostly dry cough. Hemoptysis yesterday morning, none throughout the rest of the day, then again this morning. No epistaxis or oral sores. Denies n/v/d/c. Otherwise feels well. WBC rising. No bone pain.    Review of Systems: 10 point ROS negative except as noted above.    PHYSICAL EXAM     Weight In/Out     Wt Readings from Last 3 Encounters:   06/04/19 79.8 kg (175 lb 14.4 oz)   05/29/19 81.7 kg (180 lb 1.6 oz)   05/28/19 81.1 kg (178 lb 12.8 oz)      I/O last 3 completed shifts:  In: 838.75 [P.O.:150; I.V.:295]  Out: 2800 [Urine:2800]     /84   Pulse 105   Temp 98.7  F (37.1  C)   Resp 18   Ht 1.753 m (5' 9\")   Wt 79.8 kg (175 lb 14.4 oz)   SpO2 94%   BMI 25.98 kg/m       General: NAD   Eyes: FARRAH, sclera anicteric   Nose/Mouth/Throat: OP clear, buccal mucosa moist, no ulcerations   Lungs: CTA in upper airways. Few crackles R base > left  Cardiovascular: RRR, no M/R/G   Abdominal/Rectal: +BS, soft, NT, ND, No HSM   Lymphatics: no edema  Skin: no rashes or petechaie  Neuro: A&O   Additional Findings: Hsieh site NT, no drainage.    LABS AND IMAGING - PAST 24 HOURS     Lab Results   Component Value Date    WBC 0.6 (LL) 06/05/2019    ANEU 0.4 (LL) 06/05/2019    HGB 8.9 (L) 06/05/2019    HCT 26.5 (L) 06/05/2019    PLT 14 (LL) 06/05/2019     06/05/2019    POTASSIUM 3.7 06/05/2019    CHLORIDE 109 06/05/2019    CO2 28 06/05/2019    GLC 90 06/05/2019    BUN 16 06/05/2019    CR 0.70 06/05/2019    MAG 2.0 06/03/2019    INR 1.10 06/03/2019    BILITOTAL 0.2 06/03/2019    AST 18 06/03/2019 "    ALT 23 06/03/2019    ALKPHOS 55 06/03/2019    PROTTOTAL 5.7 (L) 06/03/2019    ALBUMIN 2.6 (L) 06/03/2019       OVERALL PLAN   Angel Yanez is a 57 yo man D+19 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     1.  BMT/MM: Slow recovery secondary to low nucleated and CD34 positive cell dose.  Continue GCSF  - 5/17 stem cell transplant; cell dose was only 0.639x10^6. ([facility] [place] - known poor cell dose as failed chemo-mobilization 1/2019.)   - Continue GCSF until ANC>2500 x2 consecutive days. Increased filgrastim dose to 10mcg/kg 5/30. WBC up to 0.6, ANC 0.4.   - Per staff, decrease back to single dose daily as he is starting to engraft. Give IV GCSF 780mcg prior to discharge (already made) and start subcutaneous 480mcg in clinic tomrorow  - Taking Claritin to alleviate bone pain related to growth factor.     2.  HEME: Keep Hgb>8 and plts>20K (hemoptysis). Tylenol/Benadryl premeds.  - Pt has RBC antibodies. Antibody work up completed 5/26.   - hemoptysis 6/4 and 6/5. Give plts today before discharge. Made appts with infusion for plts on 6/6 and 6/7    3.  ID: afebrile  - admitted with febrile neutropenia on 5/26-5/28. Notes to have PIV3, which is likely the cause. Change cefepime to azithro prophy through engraftment (moxifloxacin allergy)  - proph with HD ACV, Fluconazole   - PJP proph day +28 - has at home, but knows not to start yet  - CMV neg 5/29     4.  GI:  No n/v/d.  - mild mucositis resolved  - GI prophy- omeprazole.   - LFTs wnl 5/27.     5.  FEN/Renal: Creat, lytes wnl.  Eating well.      6.  Mood:  Cont Paxil    7. Rash: follicular rash on back likely relates to fevers, sweating and being on a plastic mattress.  Now mostly resolved.    Dispo: Discharge to home today with follow up in BMT clinic tomorrow, needs GCSF and plts daily for now    Alejandra Adorno PA-C  [phone]      Patient has been seen and evaluated by me. I have reviewed today's vital signs,  medications, labs and imaging results. I have discussed the plan with the team and agree with the findings and plan in this note.      trace hemoptysis with persistent cough but no notable SOB or persisting high temp.   Parainfluenza noted.   Eating some and drinking liq well.    WBC rising s other signs of infection.  no other sites of bleeding.  ROS o/w neg    Exam with coarse BS; mostly wet sounding secretions and coarse BS; no wheezing or dullness. No e to a changes.   Cor reg s gallop. Abd soft no big HS or masses. No rebound.  No focal bone tenderness or rash. No petech or new bruises.    OK for DC with daily and close f/u.    Adebayo Lucio MD

## 2019-06-05 NOTE — PLAN OF CARE
Patient with no complaints eager to return home. Patient vital signs stable.He stated a understanding of his discharge medications and instructions. He packed his own belongings and left ambulatory to meet his wife downstairs. He will return to clinic tomorrow.  Problem: Adult Inpatient Plan of Care  Goal: Plan of Care Review  6/5/2019 1726 by Luca Tran RN  Outcome: Adequate for Discharge  6/5/2019 0638 by Brea Panda RN  Outcome: Improving  Goal: Patient-Specific Goal (Individualization)  Outcome: Adequate for Discharge  Goal: Absence of Hospital-Acquired Illness or Injury  Outcome: Adequate for Discharge  Goal: Optimal Comfort and Wellbeing  6/5/2019 1726 by Luca Tran RN  Outcome: Adequate for Discharge  6/5/2019 0638 by Brea Panda RN  Outcome: Improving  Goal: Readiness for Transition of Care  Outcome: Adequate for Discharge  Goal: Rounds/Family Conference  Outcome: Adequate for Discharge     Problem: Adjustment to Transplant (Stem Cell/Bone Marrow Transplant)  Goal: Optimal Coping with Transplant  6/5/2019 1726 by Luca Tran RN  Outcome: Adequate for Discharge  6/5/2019 0638 by Brea Panda RN  Outcome: Improving     Problem: Fatigue (Stem Cell/Bone Marrow Transplant)  Goal: Energy Level Supports Daily Activity  Outcome: Adequate for Discharge     Problem: Mucositis (Stem Cell/Bone Marrow Transplant)  Goal: Mucous Membrane Health and Integrity  Outcome: Adequate for Discharge     Problem: Nausea and Vomiting (Stem Cell/Bone Marrow Transplant)  Goal: Nausea and Vomiting Symptom Relief  Outcome: Adequate for Discharge     Problem: Nutrition Intake Altered (Stem Cell/Bone Marrow Transplant)  Goal: Optimal Nutrition Intake  Outcome: Adequate for Discharge     Problem: Fever (Fever with Neutropenia)  Goal: Baseline Body Temperature  6/5/2019 1726 by Luca Tran RN  Outcome: Adequate for Discharge  6/5/2019 0638 by Brea Panda RN  Outcome: Improving     Problem: Infection Risk  (Fever with Neutropenia)  Goal: Absence of Infection  6/5/2019 1726 by Luca Tran, RN  Outcome: Adequate for Discharge  6/5/2019 0638 by Brea Panda, RN  Outcome: Improving

## 2019-06-05 NOTE — SUMMARY OF CARE
Guille Yanez   Home Medication Instructions [id number]    Printed on:06/05/19 104   Medication Information                      acyclovir (ZOVIRAX) 800 MG tablet  Take 1 tablet (800 mg) by mouth 2 times daily             Azithromycin 250mg tablets  - Take one tablet (250mg) daily through engraftment              calcium carbonate (CALCIUM CARBONATE) 600 MG tablet  Take 2 tablets by mouth daily              Cholecalciferol (VITAMIN D3) 2000 units CAPS  Take 2,000 Units by mouth daily              fluconazole (DIFLUCAN) 200 MG tablet  Take 1 tablet (200 mg) by mouth daily             loratadine (CLARITIN) 10 MG tablet  Take 10 mg by mouth daily             omeprazole (PRILOSEC OTC) 20 MG tablet  Take 20 mg by mouth daily              PARoxetine (PAXIL) 20 MG tablet  Take 20 mg by mouth every morning             prochlorperazine (COMPAZINE) 5 MG tablet  Take 1-2 tablets (5-10 mg) by mouth every 6 hours as needed for nausea or vomiting             sulfamethoxazole-trimethoprim (BACTRIM DS/SEPTRA DS) 800-160 MG tablet  Take 1 tablet by mouth Every Mon, Tues two times daily DO NOT START UNTIL DIRECTED BY BMT STAFF (will be after D+28)

## 2019-06-06 ENCOUNTER — HOME INFUSION (PRE-WILLOW HOME INFUSION) (OUTPATIENT)
Dept: PHARMACY | Facility: CLINIC | Age: 59
End: 2019-06-06

## 2019-06-06 ENCOUNTER — INFUSION THERAPY VISIT (OUTPATIENT)
Dept: TRANSPLANT | Facility: CLINIC | Age: 59
End: 2019-06-06
Attending: PHYSICIAN ASSISTANT
Payer: COMMERCIAL

## 2019-06-06 ENCOUNTER — APPOINTMENT (OUTPATIENT)
Dept: LAB | Facility: CLINIC | Age: 59
End: 2019-06-06
Attending: PHYSICIAN ASSISTANT
Payer: COMMERCIAL

## 2019-06-06 ENCOUNTER — CARE COORDINATION (OUTPATIENT)
Dept: ONCOLOGY | Facility: CLINIC | Age: 59
End: 2019-06-06

## 2019-06-06 VITALS
BODY MASS INDEX: 25.82 KG/M2 | HEART RATE: 115 BPM | SYSTOLIC BLOOD PRESSURE: 112 MMHG | WEIGHT: 174.9 LBS | DIASTOLIC BLOOD PRESSURE: 78 MMHG | OXYGEN SATURATION: 96 % | TEMPERATURE: 97.8 F

## 2019-06-06 VITALS
RESPIRATION RATE: 16 BRPM | SYSTOLIC BLOOD PRESSURE: 107 MMHG | HEART RATE: 94 BPM | OXYGEN SATURATION: 97 % | TEMPERATURE: 98.5 F | DIASTOLIC BLOOD PRESSURE: 76 MMHG

## 2019-06-06 DIAGNOSIS — C90.01 MULTIPLE MYELOMA IN REMISSION (H): Primary | ICD-10-CM

## 2019-06-06 LAB
ANION GAP SERPL CALCULATED.3IONS-SCNC: 7 MMOL/L (ref 3–14)
BACTERIA SPEC CULT: NO GROWTH
BACTERIA SPEC CULT: NO GROWTH
BASOPHILS # BLD AUTO: 0 10E9/L (ref 0–0.2)
BASOPHILS NFR BLD AUTO: 0 %
BLD PROD TYP BPU: NORMAL
BLD UNIT ID BPU: 0
BLOOD PRODUCT CODE: NORMAL
BPU ID: NORMAL
BUN SERPL-MCNC: 16 MG/DL (ref 7–30)
CALCIUM SERPL-MCNC: 8.8 MG/DL (ref 8.5–10.1)
CHLORIDE SERPL-SCNC: 107 MMOL/L (ref 94–109)
CO2 SERPL-SCNC: 26 MMOL/L (ref 20–32)
CREAT SERPL-MCNC: 0.76 MG/DL (ref 0.66–1.25)
DIFFERENTIAL METHOD BLD: ABNORMAL
EOSINOPHIL # BLD AUTO: 0 10E9/L (ref 0–0.7)
EOSINOPHIL NFR BLD AUTO: 1.1 %
ERYTHROCYTE [DISTWIDTH] IN BLOOD BY AUTOMATED COUNT: 13.3 % (ref 10–15)
GFR SERPL CREATININE-BSD FRML MDRD: >90 ML/MIN/{1.73_M2}
GLUCOSE SERPL-MCNC: 107 MG/DL (ref 70–99)
HCT VFR BLD AUTO: 27.8 % (ref 40–53)
HGB BLD-MCNC: 9.6 G/DL (ref 13.3–17.7)
LYMPHOCYTES # BLD AUTO: 0.2 10E9/L (ref 0.8–5.3)
LYMPHOCYTES NFR BLD AUTO: 28.7 %
Lab: NORMAL
Lab: NORMAL
MAGNESIUM SERPL-MCNC: 2.2 MG/DL (ref 1.6–2.3)
MCH RBC QN AUTO: 31.3 PG (ref 26.5–33)
MCHC RBC AUTO-ENTMCNC: 34.5 G/DL (ref 31.5–36.5)
MCV RBC AUTO: 91 FL (ref 78–100)
METAMYELOCYTES # BLD: 0 10E9/L
METAMYELOCYTES NFR BLD MANUAL: 1.1 %
MONOCYTES # BLD AUTO: 0 10E9/L (ref 0–1.3)
MONOCYTES NFR BLD AUTO: 4.4 %
MYELOCYTES # BLD: 0 10E9/L
MYELOCYTES NFR BLD MANUAL: 1.1 %
NEUTROPHILS # BLD AUTO: 0.4 10E9/L (ref 1.6–8.3)
NEUTROPHILS NFR BLD AUTO: 63.6 %
PLATELET # BLD AUTO: 18 10E9/L (ref 150–450)
PLATELET # BLD EST: ABNORMAL 10*3/UL
POTASSIUM SERPL-SCNC: 4 MMOL/L (ref 3.4–5.3)
RBC # BLD AUTO: 3.07 10E12/L (ref 4.4–5.9)
RBC MORPH BLD: NORMAL
SODIUM SERPL-SCNC: 140 MMOL/L (ref 133–144)
SPECIMEN SOURCE: NORMAL
SPECIMEN SOURCE: NORMAL
TRANSFUSION STATUS PATIENT QL: NORMAL
TRANSFUSION STATUS PATIENT QL: NORMAL
WBC # BLD AUTO: 0.7 10E9/L (ref 4–11)

## 2019-06-06 PROCEDURE — 83735 ASSAY OF MAGNESIUM: CPT | Performed by: INTERNAL MEDICINE

## 2019-06-06 PROCEDURE — 25000132 ZZH RX MED GY IP 250 OP 250 PS 637: Mod: ZF | Performed by: INTERNAL MEDICINE

## 2019-06-06 PROCEDURE — 25000128 H RX IP 250 OP 636: Mod: ZF | Performed by: PHYSICIAN ASSISTANT

## 2019-06-06 PROCEDURE — P9037 PLATE PHERES LEUKOREDU IRRAD: HCPCS | Performed by: PHYSICIAN ASSISTANT

## 2019-06-06 PROCEDURE — 80048 BASIC METABOLIC PNL TOTAL CA: CPT | Performed by: INTERNAL MEDICINE

## 2019-06-06 PROCEDURE — 85025 COMPLETE CBC W/AUTO DIFF WBC: CPT | Performed by: INTERNAL MEDICINE

## 2019-06-06 PROCEDURE — 96372 THER/PROPH/DIAG INJ SC/IM: CPT

## 2019-06-06 PROCEDURE — 36430 TRANSFUSION BLD/BLD COMPNT: CPT

## 2019-06-06 PROCEDURE — G0463 HOSPITAL OUTPT CLINIC VISIT: HCPCS | Mod: ZF

## 2019-06-06 RX ORDER — DIPHENHYDRAMINE HCL 25 MG
25 CAPSULE ORAL EVERY 6 HOURS PRN
Status: CANCELLED
Start: 2019-06-06

## 2019-06-06 RX ORDER — HEPARIN SODIUM,PORCINE 10 UNIT/ML
5 VIAL (ML) INTRAVENOUS ONCE
Status: COMPLETED | OUTPATIENT
Start: 2019-06-06 | End: 2019-06-06

## 2019-06-06 RX ORDER — HEPARIN SODIUM,PORCINE 10 UNIT/ML
5 VIAL (ML) INTRAVENOUS
Status: CANCELLED | OUTPATIENT
Start: 2019-06-07

## 2019-06-06 RX ORDER — DIPHENHYDRAMINE HCL 25 MG
25 CAPSULE ORAL EVERY 6 HOURS PRN
Status: DISCONTINUED | OUTPATIENT
Start: 2019-06-06 | End: 2019-06-06 | Stop reason: HOSPADM

## 2019-06-06 RX ORDER — ACETAMINOPHEN 325 MG/1
650 TABLET ORAL EVERY 4 HOURS PRN
Status: DISCONTINUED | OUTPATIENT
Start: 2019-06-06 | End: 2019-06-06 | Stop reason: HOSPADM

## 2019-06-06 RX ORDER — ACETAMINOPHEN 325 MG/1
650 TABLET ORAL EVERY 4 HOURS PRN
Status: CANCELLED
Start: 2019-06-06

## 2019-06-06 RX ADMIN — ACETAMINOPHEN 650 MG: 325 TABLET ORAL at 14:21

## 2019-06-06 RX ADMIN — FILGRASTIM 480 MCG: 480 INJECTION, SOLUTION INTRAVENOUS; SUBCUTANEOUS at 15:47

## 2019-06-06 RX ADMIN — DIPHENHYDRAMINE HYDROCHLORIDE 25 MG: 25 CAPSULE ORAL at 14:21

## 2019-06-06 RX ADMIN — HEPARIN, PORCINE (PF) 10 UNIT/ML INTRAVENOUS SYRINGE 5 ML: at 13:49

## 2019-06-06 ASSESSMENT — PAIN SCALES - GENERAL: PAINLEVEL: NO PAIN (0)

## 2019-06-06 NOTE — PROGRESS NOTES
Care Coordination - Discharge Note     Line Company:  [facility] [phone] for line care supplies    Referral made for line care:  Yes  IV medications needed at discharge:  None   Pharmacy concerns:  None. (of note, [facility]ori requires prior auth)    PT/OT recommended:  No  Referral made for PT/OT:  NA    D/c location:  [facility]  Has placement need been communicated to Social Work?  NA    Medicare form required:  No    Caregiver:  Jennifer Yanez (wife) [phone]  Dalton Renata (son) [phone]    Outpatient Nurse Coordinator notified of patient discharge.

## 2019-06-06 NOTE — NURSING NOTE
"Oncology Rooming Note    June 6, 2019 2:06 PM   Angel Yanez is a 59 year old male who presents for:    Chief Complaint   Patient presents with     Blood Draw     Dressing change needed; labs drawn via cvc by RN     RECHECK     post bmt for MM here for labs and md visit     Initial Vitals: /78 (BP Location: Left arm, Patient Position: Chair, Cuff Size: Adult Regular)   Pulse 115   Temp 97.8  F (36.6  C) (Axillary)   Wt 79.3 kg (174 lb 14.4 oz)   SpO2 96%   BMI 25.82 kg/m   Estimated body mass index is 25.82 kg/m  as calculated from the following:    Height as of 6/5/19: 1.753 m (5' 9.02\").    Weight as of this encounter: 79.3 kg (174 lb 14.4 oz). Body surface area is 1.97 meters squared.  No Pain (0) Comment: Data Unavailable   No LMP for male patient.  Allergies reviewed: Yes  Medications reviewed: Yes    Medications: Medication refills not needed today.  Pharmacy name entered into EPIC:    [facility] [place] [place] - [address] [facility]AC[facility] - [place], [place] - [address]    Clinical concerns: pt continues to have a dry cough,        Eloisa King RN              "

## 2019-06-06 NOTE — NURSING NOTE
Chief Complaint   Patient presents with     Blood Draw     Dressing change needed; labs drawn via cvc by RN     /78 (BP Location: Left arm, Patient Position: Chair, Cuff Size: Adult Regular)   Pulse 115   Temp 97.8  F (36.6  C) (Axillary)   Wt 79.3 kg (174 lb 14.4 oz)   SpO2 96%   BMI 25.82 kg/m      Lines accessed by RN in lab. Labs collected through red lumen and sent. Both caps changed, lines flushed with NS & Heparin. Pt tolerated well.   Pt checked in for next appointment.    Maria Teresa Bourgeois RN

## 2019-06-06 NOTE — PROGRESS NOTES
Infusion Nursing Note:  Angel Yanez presents today for add-on infusion, G-CSF.    Patient seen by provider today: Yes: Lana Reynolds   present during visit today: Not Applicable.    Note: Labs were monitored.    Intravenous Access:  Hsieh.    Treatment Conditions:  Patient received 650 mg oral Tylenol and 25 mg oral Benadryl prior to receiving an add-on platelet transfusion for a platelet count of 18 (parameter 20).  Patient received G-CSF injection in his right arm.    Post Infusion Assessment:  Patient tolerated transfusion and injection without incident.       Discharge Plan:   Patient was ambulatory and discharged in stable condition accompanied by: friend.    CLARA ELLSWORTH RN

## 2019-06-06 NOTE — PROGRESS NOTES
BMT Clinic Progress Note     Patient ID:  Angel Yanez is a 59 year old male, currently day +19 of his HCT.      Diagnosis MM Multiple myeloma  HCT Type Autologous    Prep Regimen Cytoxan  Melphalan   Primary BMT MD Dr. Lucio      Angel was admitted 5/29-6/5/2019 for febrile neutropenia.     INTERVAL  HISTORY      Discharged yesterday. Still has a cough, occasional hemoptysis (including this morning). No fevers, no other bleeding. Denies n/v. Stools are soft, not watery. No HA or pain. Improving rash on back.     Review of Systems: 10 point ROS negative except as noted above.     PHYSICAL EXAM   Blood pressure 112/78, pulse 115, temperature 97.8  F (36.6  C), temperature source Axillary, weight 79.3 kg (174 lb 14.4 oz), SpO2 96 %.    General: NAD   Eyes:  sclera anicteric   Nose/Mouth/Throat: OP moist, no ulcerations   Lungs: CTAB  Cardiovascular: RRR, no M/R/G   Abdominal/Rectal: +BS, soft, NT, ND, No HSM   Lymphatics: no edema  Skin: improved dry, follicular rash scattered on upper back  Neuro: non-focal   Additional Findings: CVC site NT, no drainage.    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     1.  BMT/MM: Slow recovery secondary to low nucleated and CD34 positive cell dose.  - 5/17 stem cell transplant; cell dose was only 0.639x10^6. ([facility] [place] - known  poor cell dose as failed chemo-mobilization 1/2019.)   - Continue GCSF until ANC>2500 x2 consecutive days. Increased filgrastim dose to 10mcg/kg 5/30, back to 5mcg/kg 6/6. WBC up to 700 today.  - Taking Claritin to alleviate bone pain related to growth factor.     2.  HEME: Keep Hgb>8 and plts>20K (hemoptysis). Tylenol/Benadryl premeds. Plts today for count of 18k.  - Pt has RBC antibodies. Antibody work up completed 5/26.   - hemoptysis 6/4 and 6/5.      3.  ID: Afebrile  - admitted with febrile neutropenia on 5/26-5/28. +parainfluenza 3. Ongoing cough with mild associated hemoptysis. Cough drops, supportive cares.  - s/p empiric Cefepime  - prophy with HD ACV, Fluconazole, azithro   - PJP proph day +28   - CMV neg 5/29     4.  GI:  No n/v/d.  - mild mucositis, resolved  - GI prophy- omeprazole.      5.  FEN/Renal: Creat, lytes wnl. Eating well.      6.  Mood:  Cont Paxil     7. Rash: follicular rash on back likely relates to fevers, sweating and being on a plastic mattress.  Improved.     Plan:  plts today  RTC daily for labs, GCSF, f/u, possible transfusions (appts made through Monday for now)       Lana Reynolds PA-C  [phone]

## 2019-06-07 ENCOUNTER — INFUSION THERAPY VISIT (OUTPATIENT)
Dept: TRANSPLANT | Facility: CLINIC | Age: 59
End: 2019-06-07
Attending: NURSE PRACTITIONER
Payer: COMMERCIAL

## 2019-06-07 ENCOUNTER — APPOINTMENT (OUTPATIENT)
Dept: LAB | Facility: CLINIC | Age: 59
End: 2019-06-07
Attending: NURSE PRACTITIONER
Payer: COMMERCIAL

## 2019-06-07 VITALS
OXYGEN SATURATION: 96 % | TEMPERATURE: 97.7 F | RESPIRATION RATE: 16 BRPM | HEART RATE: 104 BPM | SYSTOLIC BLOOD PRESSURE: 119 MMHG | DIASTOLIC BLOOD PRESSURE: 84 MMHG | WEIGHT: 174.2 LBS | BODY MASS INDEX: 25.71 KG/M2

## 2019-06-07 DIAGNOSIS — C90.01 MULTIPLE MYELOMA IN REMISSION (H): ICD-10-CM

## 2019-06-07 DIAGNOSIS — C90.01 MULTIPLE MYELOMA IN REMISSION (H): Primary | ICD-10-CM

## 2019-06-07 DIAGNOSIS — C90.00 MULTIPLE MYELOMA NOT HAVING ACHIEVED REMISSION (H): Primary | ICD-10-CM

## 2019-06-07 LAB
ANION GAP SERPL CALCULATED.3IONS-SCNC: 6 MMOL/L (ref 3–14)
BACTERIA SPEC CULT: NO GROWTH
BACTERIA SPEC CULT: NO GROWTH
BASOPHILS # BLD AUTO: 0 10E9/L (ref 0–0.2)
BASOPHILS NFR BLD AUTO: 0.4 %
BLD PROD TYP BPU: NORMAL
BLD UNIT ID BPU: 0
BLOOD BANK CMNT PATIENT-IMP: NORMAL
BLOOD PRODUCT CODE: NORMAL
BPU ID: NORMAL
BUN SERPL-MCNC: 14 MG/DL (ref 7–30)
CALCIUM SERPL-MCNC: 8.5 MG/DL (ref 8.5–10.1)
CHLORIDE SERPL-SCNC: 108 MMOL/L (ref 94–109)
CO2 SERPL-SCNC: 27 MMOL/L (ref 20–32)
CREAT SERPL-MCNC: 0.74 MG/DL (ref 0.66–1.25)
DIFFERENTIAL METHOD BLD: ABNORMAL
EOSINOPHIL # BLD AUTO: 0 10E9/L (ref 0–0.7)
EOSINOPHIL NFR BLD AUTO: 1.3 %
ERYTHROCYTE [DISTWIDTH] IN BLOOD BY AUTOMATED COUNT: 13.2 % (ref 10–15)
GFR SERPL CREATININE-BSD FRML MDRD: >90 ML/MIN/{1.73_M2}
GLUCOSE SERPL-MCNC: 105 MG/DL (ref 70–99)
HCT VFR BLD AUTO: 26.6 % (ref 40–53)
HGB BLD-MCNC: 9.1 G/DL (ref 13.3–17.7)
LYMPHOCYTES # BLD AUTO: 0.2 10E9/L (ref 0.8–5.3)
LYMPHOCYTES NFR BLD AUTO: 25.6 %
Lab: NORMAL
Lab: NORMAL
MCH RBC QN AUTO: 30.8 PG (ref 26.5–33)
MCHC RBC AUTO-ENTMCNC: 34.2 G/DL (ref 31.5–36.5)
MCV RBC AUTO: 90 FL (ref 78–100)
MONOCYTES # BLD AUTO: 0 10E9/L (ref 0–1.3)
MONOCYTES NFR BLD AUTO: 6.1 %
NEUTROPHILS # BLD AUTO: 0.5 10E9/L (ref 1.6–8.3)
NEUTROPHILS NFR BLD AUTO: 66.2 %
PLATELET # BLD AUTO: 26 10E9/L (ref 150–450)
PLATELET # BLD EST: ABNORMAL 10*3/UL
POTASSIUM SERPL-SCNC: 3.7 MMOL/L (ref 3.4–5.3)
PROMYELOCYTES # BLD MANUAL: 0 10E9/L
PROMYELOCYTES NFR BLD MANUAL: 0.4 %
RBC # BLD AUTO: 2.95 10E12/L (ref 4.4–5.9)
RBC MORPH BLD: NORMAL
SODIUM SERPL-SCNC: 141 MMOL/L (ref 133–144)
SPECIMEN SOURCE: NORMAL
SPECIMEN SOURCE: NORMAL
TRANSFUSION STATUS PATIENT QL: NORMAL
TRANSFUSION STATUS PATIENT QL: NORMAL
WBC # BLD AUTO: 0.7 10E9/L (ref 4–11)

## 2019-06-07 PROCEDURE — 25000128 H RX IP 250 OP 636: Mod: ZF | Performed by: NURSE PRACTITIONER

## 2019-06-07 PROCEDURE — 25000128 H RX IP 250 OP 636: Mod: ZF | Performed by: PHYSICIAN ASSISTANT

## 2019-06-07 PROCEDURE — 96372 THER/PROPH/DIAG INJ SC/IM: CPT

## 2019-06-07 PROCEDURE — 85025 COMPLETE CBC W/AUTO DIFF WBC: CPT | Performed by: PHYSICIAN ASSISTANT

## 2019-06-07 PROCEDURE — 80048 BASIC METABOLIC PNL TOTAL CA: CPT | Performed by: PHYSICIAN ASSISTANT

## 2019-06-07 RX ORDER — HEPARIN SODIUM,PORCINE 10 UNIT/ML
5 VIAL (ML) INTRAVENOUS ONCE
Status: COMPLETED | OUTPATIENT
Start: 2019-06-07 | End: 2019-06-07

## 2019-06-07 RX ORDER — DIPHENHYDRAMINE HCL 25 MG
25 CAPSULE ORAL EVERY 6 HOURS PRN
Status: CANCELLED
Start: 2019-06-07

## 2019-06-07 RX ORDER — HEPARIN SODIUM,PORCINE 10 UNIT/ML
5 VIAL (ML) INTRAVENOUS
Status: CANCELLED | OUTPATIENT
Start: 2019-06-08

## 2019-06-07 RX ORDER — ACETAMINOPHEN 325 MG/1
650 TABLET ORAL EVERY 4 HOURS PRN
Status: CANCELLED
Start: 2019-06-07

## 2019-06-07 RX ADMIN — FILGRASTIM 480 MCG: 480 INJECTION, SOLUTION INTRAVENOUS; SUBCUTANEOUS at 12:42

## 2019-06-07 RX ADMIN — HEPARIN, PORCINE (PF) 10 UNIT/ML INTRAVENOUS SYRINGE 5 ML: at 11:57

## 2019-06-07 ASSESSMENT — PAIN SCALES - GENERAL: PAINLEVEL: NO PAIN (0)

## 2019-06-07 NOTE — PROGRESS NOTES
This is a recent snapshot of the patient's [facility] medical record.  For current drug dose and complete information and questions, call [phone] or In Basket pool, [facility] home infusion ([id number])  CSN Number:  [id number]

## 2019-06-07 NOTE — NURSING NOTE
Pt had dressing changed using Sterile technique. Site looks clean and dry. New dressing was applied.       Shoshana Salazar MA

## 2019-06-07 NOTE — NURSING NOTE
"Oncology Rooming Note    June 7, 2019 12:24 PM   Angel Yanez is a 59 year old male who presents for:    Chief Complaint   Patient presents with     Blood Draw     Labs drawn via CVC by RN in lab. VS taken. Patient checked in for next appointment.     RECHECK     Pt is here for labs and to see NP for S/P MM     Initial Vitals: Blood Pressure 119/84 (BP Location: Right arm, Patient Position: Sitting, Cuff Size: Adult Regular)   Pulse 104   Temperature 97.7  F (36.5  C) (Tympanic)   Respiration 16   Weight 79 kg (174 lb 3.2 oz)   Oxygen Saturation 96%   Body Mass Index 25.71 kg/m   Estimated body mass index is 25.71 kg/m  as calculated from the following:    Height as of 6/5/19: 1.753 m (5' 9.02\").    Weight as of this encounter: 79 kg (174 lb 3.2 oz). Body surface area is 1.96 meters squared.  No Pain (0) Comment: Data Unavailable   No LMP for male patient.  Allergies reviewed: Yes  Medications reviewed: Yes    Medications: Medication refills not needed today.  Pharmacy name entered into EPIC:    [facility] [address]  [facility] - [place], [place] - [address]    Clinical concerns: none       Shoshana Salazar MA              "

## 2019-06-07 NOTE — NURSING NOTE
Chief Complaint   Patient presents with     Blood Draw     Labs drawn via CVC by RN in lab. VS taken. Patient checked in for next appointment.     Labs collected from CVC by RN, line flushed with saline and heparin.  Vitals taken. Pt checked in for appointments. Patient needs dressing change today.    Ling Rios RN

## 2019-06-07 NOTE — PROGRESS NOTES
BMT Clinic  Note     Patient ID:  Angel Yanez is a 59 year old male, day +21 s/p 2nd auto for IgB kappa MM .      Diagnosis MM Multiple myeloma  HCT Type Autologous    Prep Regimen Cytoxan  Melphalan   Primary BMT MD Dr. Lucio       INTERVAL  HISTORY   Guille is feeling pretty good, just tired.  He still has a cough, less bothersome at night than previously & no blood in the sputum today.  Afebrile.  Appetite is poor but he is eating with no N/V/D.  No pain, bleeding or rash.    Review of Systems: 10 point ROS negative except as noted above.     PHYSICAL EXAM   Blood pressure 119/84, pulse 104, temperature 97.7  F (36.5  C), temperature source Tympanic, resp. rate 16, weight 79 kg (174 lb 3.2 oz), SpO2 96 %.    General: NAD   Eyes:  sclera anicteric   Nose/Mouth/Throat: OP moist, no ulcerations   Lungs: CTAB  Cardiovascular: RRR, no M/R/G   Abdominal/Rectal: +BS, soft, NT, ND, No HSM   Lymphatics: no edema  Skin: no rash or petechaie  Neuro: non-focal   Additional Findings: CVC site NT, no drainage.    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  BMT/MM: Slow recovery secondary to low nucleated and CD34 positive cell dose. White count stable today  - 5/17 stem cell transplant; cell dose was only 0.639x10^6. ([facility] [place] - known poor cell dose as failed chemo-mobilization 1/2019.)   - Continue GCSF until ANC>2500  x2 consecutive days. In patient was on 10mcg/kg, dropped to  5mcg/kg on 6/6.      2.  HEME: Keep Hgb>8 and plts>20K (hemoptysis). Tylenol/Benadryl premeds. No plt today. No hemoptysis overnight.  - Pt has RBC antibodies. Antibody work up completed 5/26.      3.  ID: Afebrile  - admitted with febrile neutropenia on 5/26-5/28. +parainfluenza 3. Ongoing cough, no more hemoptysis.  Cough drops, supportive cares.  - prophy with HD ACV, Fluconazole, azithro   - PJP proph day +28   - CMV neg 5/29     4.  GI:  No n/v/d.  - GI prophy- omeprazole.      5.  FEN/Renal: Creat, lytes wnl. Eating well.      6.  Mood:  Cont Paxil     RTC daily for labs, GCSF, f/u, possible transfusions (appts made through Monday for now)       Karla Manzo

## 2019-06-07 NOTE — NURSING NOTE
Pt ws given his GCSF in his left arm with no concerns.    Please see MAR.     Shoshana Salazar MA

## 2019-06-08 ENCOUNTER — INFUSION THERAPY VISIT (OUTPATIENT)
Dept: TRANSPLANT | Facility: CLINIC | Age: 59
End: 2019-06-08
Attending: INTERNAL MEDICINE
Payer: COMMERCIAL

## 2019-06-08 ENCOUNTER — ONCOLOGY VISIT (OUTPATIENT)
Dept: TRANSPLANT | Facility: CLINIC | Age: 59
End: 2019-06-08
Attending: PHYSICIAN ASSISTANT
Payer: COMMERCIAL

## 2019-06-08 VITALS
HEART RATE: 97 BPM | RESPIRATION RATE: 16 BRPM | DIASTOLIC BLOOD PRESSURE: 74 MMHG | OXYGEN SATURATION: 100 % | SYSTOLIC BLOOD PRESSURE: 120 MMHG

## 2019-06-08 VITALS
HEART RATE: 110 BPM | RESPIRATION RATE: 16 BRPM | DIASTOLIC BLOOD PRESSURE: 71 MMHG | WEIGHT: 173 LBS | OXYGEN SATURATION: 97 % | TEMPERATURE: 97.1 F | SYSTOLIC BLOOD PRESSURE: 112 MMHG | BODY MASS INDEX: 25.54 KG/M2

## 2019-06-08 DIAGNOSIS — C90.01 MULTIPLE MYELOMA IN REMISSION (H): ICD-10-CM

## 2019-06-08 DIAGNOSIS — C90.00 MULTIPLE MYELOMA NOT HAVING ACHIEVED REMISSION (H): Primary | ICD-10-CM

## 2019-06-08 LAB
ANION GAP SERPL CALCULATED.3IONS-SCNC: 6 MMOL/L (ref 3–14)
BACTERIA SPEC CULT: NO GROWTH
BACTERIA SPEC CULT: NO GROWTH
BASOPHILS # BLD AUTO: 0 10E9/L (ref 0–0.2)
BASOPHILS NFR BLD AUTO: 0.4 %
BLD PROD TYP BPU: NORMAL
BLD UNIT ID BPU: 0
BLOOD PRODUCT CODE: NORMAL
BPU ID: NORMAL
BUN SERPL-MCNC: 15 MG/DL (ref 7–30)
CALCIUM SERPL-MCNC: 8.7 MG/DL (ref 8.5–10.1)
CHLORIDE SERPL-SCNC: 108 MMOL/L (ref 94–109)
CMV DNA SPEC NAA+PROBE-ACNC: NORMAL [IU]/ML
CMV DNA SPEC NAA+PROBE-LOG#: NORMAL {LOG_IU}/ML
CO2 SERPL-SCNC: 26 MMOL/L (ref 20–32)
CREAT SERPL-MCNC: 0.77 MG/DL (ref 0.66–1.25)
DIFFERENTIAL METHOD BLD: ABNORMAL
EOSINOPHIL # BLD AUTO: 0 10E9/L (ref 0–0.7)
EOSINOPHIL NFR BLD AUTO: 2.3 %
ERYTHROCYTE [DISTWIDTH] IN BLOOD BY AUTOMATED COUNT: 13.1 % (ref 10–15)
GFR SERPL CREATININE-BSD FRML MDRD: >90 ML/MIN/{1.73_M2}
GLUCOSE SERPL-MCNC: 120 MG/DL (ref 70–99)
HCT VFR BLD AUTO: 28 % (ref 40–53)
HGB BLD-MCNC: 9.3 G/DL (ref 13.3–17.7)
LYMPHOCYTES # BLD AUTO: 0.3 10E9/L (ref 0.8–5.3)
LYMPHOCYTES NFR BLD AUTO: 31.5 %
Lab: NORMAL
Lab: NORMAL
MAGNESIUM SERPL-MCNC: 2.1 MG/DL (ref 1.6–2.3)
MCH RBC QN AUTO: 30.7 PG (ref 26.5–33)
MCHC RBC AUTO-ENTMCNC: 33.2 G/DL (ref 31.5–36.5)
MCV RBC AUTO: 92 FL (ref 78–100)
METAMYELOCYTES # BLD: 0 10E9/L
METAMYELOCYTES NFR BLD MANUAL: 0.5 %
MONOCYTES # BLD AUTO: 0 10E9/L (ref 0–1.3)
MONOCYTES NFR BLD AUTO: 5.4 %
NEUTROPHILS # BLD AUTO: 0.5 10E9/L (ref 1.6–8.3)
NEUTROPHILS NFR BLD AUTO: 59.9 %
NRBC # BLD AUTO: 0 10*3/UL
NRBC BLD AUTO-RTO: 1 /100
PLATELET # BLD AUTO: 17 10E9/L (ref 150–450)
POTASSIUM SERPL-SCNC: 3.8 MMOL/L (ref 3.4–5.3)
RBC # BLD AUTO: 3.03 10E12/L (ref 4.4–5.9)
RBC MORPH BLD: NORMAL
SODIUM SERPL-SCNC: 140 MMOL/L (ref 133–144)
SPECIMEN SOURCE: NORMAL
TRANSFUSION STATUS PATIENT QL: NORMAL
TRANSFUSION STATUS PATIENT QL: NORMAL
WBC # BLD AUTO: 0.8 10E9/L (ref 4–11)

## 2019-06-08 PROCEDURE — P9037 PLATE PHERES LEUKOREDU IRRAD: HCPCS | Performed by: PHYSICIAN ASSISTANT

## 2019-06-08 PROCEDURE — 85025 COMPLETE CBC W/AUTO DIFF WBC: CPT | Performed by: NURSE PRACTITIONER

## 2019-06-08 PROCEDURE — 25000128 H RX IP 250 OP 636: Mod: ZF | Performed by: PHYSICIAN ASSISTANT

## 2019-06-08 PROCEDURE — 96372 THER/PROPH/DIAG INJ SC/IM: CPT

## 2019-06-08 PROCEDURE — 80048 BASIC METABOLIC PNL TOTAL CA: CPT | Performed by: NURSE PRACTITIONER

## 2019-06-08 PROCEDURE — 83735 ASSAY OF MAGNESIUM: CPT | Performed by: INTERNAL MEDICINE

## 2019-06-08 PROCEDURE — 36430 TRANSFUSION BLD/BLD COMPNT: CPT

## 2019-06-08 PROCEDURE — 83735 ASSAY OF MAGNESIUM: CPT | Performed by: NURSE PRACTITIONER

## 2019-06-08 PROCEDURE — 25000128 H RX IP 250 OP 636: Mod: ZF | Performed by: INTERNAL MEDICINE

## 2019-06-08 RX ORDER — DIPHENHYDRAMINE HCL 25 MG
25 CAPSULE ORAL EVERY 6 HOURS PRN
Status: DISCONTINUED | OUTPATIENT
Start: 2019-06-08 | End: 2019-06-08 | Stop reason: HOSPADM

## 2019-06-08 RX ORDER — HEPARIN SODIUM,PORCINE 10 UNIT/ML
5 VIAL (ML) INTRAVENOUS
Status: DISCONTINUED | OUTPATIENT
Start: 2019-06-08 | End: 2019-06-08 | Stop reason: HOSPADM

## 2019-06-08 RX ORDER — ACETAMINOPHEN 325 MG/1
650 TABLET ORAL EVERY 4 HOURS PRN
Status: DISCONTINUED | OUTPATIENT
Start: 2019-06-08 | End: 2019-06-08 | Stop reason: HOSPADM

## 2019-06-08 RX ORDER — DIPHENHYDRAMINE HCL 25 MG
25 CAPSULE ORAL EVERY 6 HOURS PRN
Status: CANCELLED
Start: 2019-06-08

## 2019-06-08 RX ORDER — ACETAMINOPHEN 325 MG/1
650 TABLET ORAL EVERY 4 HOURS PRN
Status: CANCELLED
Start: 2019-06-08

## 2019-06-08 RX ORDER — HEPARIN SODIUM,PORCINE 10 UNIT/ML
5 VIAL (ML) INTRAVENOUS
Status: CANCELLED | OUTPATIENT
Start: 2019-06-09

## 2019-06-08 RX ADMIN — HEPARIN, PORCINE (PF) 10 UNIT/ML INTRAVENOUS SYRINGE 5 ML: at 08:26

## 2019-06-08 RX ADMIN — FILGRASTIM 480 MCG: 480 INJECTION, SOLUTION INTRAVENOUS; SUBCUTANEOUS at 09:35

## 2019-06-08 RX ADMIN — HEPARIN, PORCINE (PF) 10 UNIT/ML INTRAVENOUS SYRINGE 5 ML: at 08:25

## 2019-06-08 ASSESSMENT — PAIN SCALES - GENERAL: PAINLEVEL: NO PAIN (0)

## 2019-06-08 NOTE — PROGRESS NOTES
BMT Clinic  Note     Patient ID:  Angel Yanez is a 59 year old male, day +22 s/p 2nd auto for IgB kappa MM .      Diagnosis MM Multiple myeloma  HCT Type Autologous    Prep Regimen Cytoxan  Melphalan   Primary BMT MD Dr. Lucio       INTERVAL  HISTORY   Still coughing, though less than before, less at night, no hemoptysis  Review of Systems: 10 point ROS negative except as noted above.     PHYSICAL EXAM   Blood pressure 112/71, pulse 110, temperature 97.1  F (36.2  C), temperature source Tympanic, resp. rate 16, weight 78.5 kg (173 lb), SpO2 97 %.    General: NAD   Eyes:  sclera anicteric   Nose/Mouth/Throat: OP moist, no ulcerations   Lungs: CTAB  Cardiovascular: RRR, no M/R/G   Abdominal/Rectal: +BS, soft, NT, ND, No HSM   Lymphatics: no edema  Skin: no rash or petechaie  Neuro: non-focal   Additional Findings: CVC site NT, no drainage.    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  BMT/MM: Slow recovery secondary to low nucleated and CD34 positive cell dose. White count stable today  - 5/17 stem cell transplant; cell dose was only 0.639x10^6. ([facility] [place] - known poor cell dose as failed chemo-mobilization 1/2019.)   - Continue GCSF until ANC>2500 x2 consecutive days. In patient was on 10mcg/kg, dropped to  5mcg/kg on 6/6.      2.  HEME: Keep Hgb>8 and plts>20K (hemoptysis). Tylenol/Benadryl premeds. No plt  today. No hemoptysis overnight.  - Pt has RBC antibodies. Antibody work up completed 5/26.      3.  ID: Afebrile  - admitted with febrile neutropenia on 5/26-5/28. +parainfluenza 3. Ongoing cough, no more hemoptysis.  Cough drops, supportive cares.  - prophy with HD ACV, Fluconazole, azithro   - PJP proph day +28   - CMV neg 5/29     4.  GI:  No n/v/d.  - GI prophy- omeprazole.      5.  FEN/Renal: Creat, lytes wnl. Eating well.      6.  Mood:  Cont Paxil     RTC daily for labs, GCSF, f/u, possible transfusions (appts made through Monday for now)       Nadira Ballesteros

## 2019-06-08 NOTE — PROGRESS NOTES
Infusion Nursing Note:  Angel Yanez presents today for platelets.    Patient seen by provider today: No   present during visit today: Not Applicable.    Note: Patient received platelets.  Patient declined premeds and says he's unsure why he gets premeds, he's never had any reactions to blood products.  Patient also received GCSF  Intravenous Access:  Hsieh.    Treatment Conditions:  Lab Results   Component Value Date    HGB 9.3 06/08/2019     Lab Results   Component Value Date    WBC 0.8 06/08/2019      Lab Results   Component Value Date    ANEU 0.5 06/08/2019     Lab Results   Component Value Date    PLT 17 06/08/2019      Lab Results   Component Value Date     06/08/2019                   Lab Results   Component Value Date    POTASSIUM 3.8 06/08/2019           Lab Results   Component Value Date    MAG 2.1 06/08/2019            Lab Results   Component Value Date    CR 0.77 06/08/2019                   Lab Results   Component Value Date    ZHEN 8.7 06/08/2019                Lab Results   Component Value Date    BILITOTAL 0.2 06/03/2019           Lab Results   Component Value Date    ALBUMIN 2.6 06/03/2019                    Lab Results   Component Value Date    ALT 23 06/03/2019           Lab Results   Component Value Date    AST 18 06/03/2019       Results reviewed, labs MET treatment parameters, ok to proceed with treatment.      Post Infusion Assessment:  Patient tolerated infusion without incident.  Patient tolerated injection without incident.  Site patent and intact, free from redness, edema or discomfort.       Discharge Plan:   Discharge instructions reviewed with: Patient.  Patient and/or family verbalized understanding of discharge instructions and all questions answered.  Patient discharged in stable condition accompanied by: self.  Departure Mode: Ambulatory.    Marry Astorga RN

## 2019-06-08 NOTE — NURSING NOTE
Patient presents to the ipsy Infusion for filgrastim (NEUPOGEN) injection 480 mcg. Order written by Dr. Lucio was completed today. Name and  were verified by patient. See MAR for medication details. Medication was given in the following site: right arm. Patient tolerated injection well and was discharged to home.

## 2019-06-09 ENCOUNTER — APPOINTMENT (OUTPATIENT)
Dept: LAB | Facility: CLINIC | Age: 59
End: 2019-06-09
Attending: INTERNAL MEDICINE
Payer: COMMERCIAL

## 2019-06-09 ENCOUNTER — INFUSION THERAPY VISIT (OUTPATIENT)
Dept: TRANSPLANT | Facility: CLINIC | Age: 59
End: 2019-06-09
Attending: INTERNAL MEDICINE
Payer: COMMERCIAL

## 2019-06-09 ENCOUNTER — ONCOLOGY VISIT (OUTPATIENT)
Dept: TRANSPLANT | Facility: CLINIC | Age: 59
End: 2019-06-09
Attending: PHYSICIAN ASSISTANT
Payer: COMMERCIAL

## 2019-06-09 VITALS
HEART RATE: 117 BPM | WEIGHT: 173.3 LBS | SYSTOLIC BLOOD PRESSURE: 111 MMHG | TEMPERATURE: 96.9 F | OXYGEN SATURATION: 97 % | DIASTOLIC BLOOD PRESSURE: 74 MMHG | BODY MASS INDEX: 25.58 KG/M2 | RESPIRATION RATE: 16 BRPM

## 2019-06-09 DIAGNOSIS — C90.01 MULTIPLE MYELOMA IN REMISSION (H): Primary | ICD-10-CM

## 2019-06-09 LAB
ANION GAP SERPL CALCULATED.3IONS-SCNC: 6 MMOL/L (ref 3–14)
BACTERIA SPEC CULT: NO GROWTH
BACTERIA SPEC CULT: NO GROWTH
BASOPHILS # BLD AUTO: 0 10E9/L (ref 0–0.2)
BASOPHILS NFR BLD AUTO: 0.4 %
BLD PROD TYP BPU: NORMAL
BLD PROD TYP BPU: NORMAL
BLD UNIT ID BPU: 0
BLOOD PRODUCT CODE: NORMAL
BPU ID: NORMAL
BUN SERPL-MCNC: 12 MG/DL (ref 7–30)
CALCIUM SERPL-MCNC: 8.8 MG/DL (ref 8.5–10.1)
CHLORIDE SERPL-SCNC: 107 MMOL/L (ref 94–109)
CO2 SERPL-SCNC: 26 MMOL/L (ref 20–32)
CREAT SERPL-MCNC: 0.74 MG/DL (ref 0.66–1.25)
DIFFERENTIAL METHOD BLD: ABNORMAL
EOSINOPHIL # BLD AUTO: 0 10E9/L (ref 0–0.7)
EOSINOPHIL NFR BLD AUTO: 2.7 %
ERYTHROCYTE [DISTWIDTH] IN BLOOD BY AUTOMATED COUNT: 13 % (ref 10–15)
GFR SERPL CREATININE-BSD FRML MDRD: >90 ML/MIN/{1.73_M2}
GLUCOSE SERPL-MCNC: 104 MG/DL (ref 70–99)
HCT VFR BLD AUTO: 27.9 % (ref 40–53)
HGB BLD-MCNC: 9.4 G/DL (ref 13.3–17.7)
LYMPHOCYTES # BLD AUTO: 0.2 10E9/L (ref 0.8–5.3)
LYMPHOCYTES NFR BLD AUTO: 24.2 %
Lab: NORMAL
Lab: NORMAL
MAGNESIUM SERPL-MCNC: 1.9 MG/DL (ref 1.6–2.3)
MCH RBC QN AUTO: 30.7 PG (ref 26.5–33)
MCHC RBC AUTO-ENTMCNC: 33.7 G/DL (ref 31.5–36.5)
MCV RBC AUTO: 91 FL (ref 78–100)
MONOCYTES # BLD AUTO: 0.1 10E9/L (ref 0–1.3)
MONOCYTES NFR BLD AUTO: 11.5 %
MYELOCYTES # BLD: 0 10E9/L
MYELOCYTES NFR BLD MANUAL: 0.4 %
NEUTROPHILS # BLD AUTO: 0.5 10E9/L (ref 1.6–8.3)
NEUTROPHILS NFR BLD AUTO: 60.4 %
NRBC # BLD AUTO: 0 10*3/UL
NRBC BLD AUTO-RTO: 1 /100
NUM BPU REQUESTED: 1
OVALOCYTES BLD QL SMEAR: SLIGHT
PLATELET # BLD AUTO: 19 10E9/L (ref 150–450)
PLATELET # BLD EST: ABNORMAL 10*3/UL
POIKILOCYTOSIS BLD QL SMEAR: SLIGHT
POTASSIUM SERPL-SCNC: 3.6 MMOL/L (ref 3.4–5.3)
PROMYELOCYTES # BLD MANUAL: 0 10E9/L
PROMYELOCYTES NFR BLD MANUAL: 0.4 %
RBC # BLD AUTO: 3.06 10E12/L (ref 4.4–5.9)
SODIUM SERPL-SCNC: 140 MMOL/L (ref 133–144)
SPECIMEN SOURCE: NORMAL
SPECIMEN SOURCE: NORMAL
TRANSFUSION STATUS PATIENT QL: NORMAL
TRANSFUSION STATUS PATIENT QL: NORMAL
WBC # BLD AUTO: 0.9 10E9/L (ref 4–11)

## 2019-06-09 PROCEDURE — 96372 THER/PROPH/DIAG INJ SC/IM: CPT

## 2019-06-09 PROCEDURE — 86900 BLOOD TYPING SEROLOGIC ABO: CPT | Performed by: INTERNAL MEDICINE

## 2019-06-09 PROCEDURE — 86901 BLOOD TYPING SEROLOGIC RH(D): CPT | Performed by: INTERNAL MEDICINE

## 2019-06-09 PROCEDURE — 40000268 ZZH STATISTIC NO CHARGES: Mod: ZF

## 2019-06-09 PROCEDURE — 86850 RBC ANTIBODY SCREEN: CPT | Performed by: INTERNAL MEDICINE

## 2019-06-09 PROCEDURE — 80048 BASIC METABOLIC PNL TOTAL CA: CPT | Performed by: INTERNAL MEDICINE

## 2019-06-09 PROCEDURE — 85025 COMPLETE CBC W/AUTO DIFF WBC: CPT | Performed by: INTERNAL MEDICINE

## 2019-06-09 PROCEDURE — 83735 ASSAY OF MAGNESIUM: CPT | Performed by: INTERNAL MEDICINE

## 2019-06-09 PROCEDURE — 25000128 H RX IP 250 OP 636: Mod: ZF | Performed by: PHYSICIAN ASSISTANT

## 2019-06-09 RX ORDER — HEPARIN SODIUM,PORCINE 10 UNIT/ML
5 VIAL (ML) INTRAVENOUS
Status: DISCONTINUED | OUTPATIENT
Start: 2019-06-09 | End: 2019-06-09 | Stop reason: HOSPADM

## 2019-06-09 RX ORDER — ACETAMINOPHEN 325 MG/1
650 TABLET ORAL EVERY 4 HOURS PRN
Status: CANCELLED
Start: 2019-06-09

## 2019-06-09 RX ORDER — HEPARIN SODIUM,PORCINE 10 UNIT/ML
5 VIAL (ML) INTRAVENOUS
Status: CANCELLED | OUTPATIENT
Start: 2019-06-10

## 2019-06-09 RX ORDER — DIPHENHYDRAMINE HCL 25 MG
25 CAPSULE ORAL EVERY 6 HOURS PRN
Status: CANCELLED
Start: 2019-06-09

## 2019-06-09 RX ADMIN — HEPARIN, PORCINE (PF) 10 UNIT/ML INTRAVENOUS SYRINGE 5 ML: at 07:45

## 2019-06-09 RX ADMIN — FILGRASTIM 480 MCG: 480 INJECTION, SOLUTION INTRAVENOUS; SUBCUTANEOUS at 08:21

## 2019-06-09 ASSESSMENT — PAIN SCALES - GENERAL: PAINLEVEL: NO PAIN (0)

## 2019-06-09 NOTE — PROGRESS NOTES
BMT Clinic  Note     Patient ID:  Angel Yanez is a 59 year old male, day +23 s/p 2nd auto for IgB kappa MM .      Diagnosis MM Multiple myeloma  HCT Type Autologous    Prep Regimen Cytoxan  Melphalan   Primary BMT MD Dr. Lucio       INTERVAL  HISTORY   Still coughing, though less than before, less at night, no hemoptysis  Review of Systems: 10 point ROS negative except as noted above.     PHYSICAL EXAM   Blood pressure 111/74, pulse 117, temperature 96.9  F (36.1  C), temperature source Tympanic, resp. rate 16, weight 78.6 kg (173 lb 4.8 oz), SpO2 97 %.    General: NAD   Eyes:  sclera anicteric   Nose/Mouth/Throat: OP moist, no ulcerations   Lungs: CTAB  Cardiovascular: RRR, no M/R/G   Abdominal/Rectal: +BS, soft, NT, ND, No HSM   Lymphatics: no edema  Skin: no rash or petechaie  Neuro: non-focal   Additional Findings: CVC site NT, no drainage.    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  BMT/MM: Slow recovery secondary to low nucleated and CD34 positive cell dose. White count stable today  - 5/17 stem cell transplant; cell dose was only 0.639x10^6. ([facility] [place] - known poor cell dose as failed chemo-mobilization 1/2019.)   - Continue GCSF until ANC>2500 x2 consecutive days. In patient was on 10mcg/kg, dropped to  5mcg/kg on 6/6.      2.  HEME: Keep Hgb>8 and plts>20K (hemoptysis). Tylenol/Benadryl premeds. No  plt today. No hemoptysis overnight.  - Pt has RBC antibodies. Antibody work up completed 5/26.      3.  ID: Afebrile  - admitted with febrile neutropenia on 5/26-5/28. +parainfluenza 3. Ongoing cough, no more hemoptysis.  Cough drops, supportive cares.  - prophy with HD ACV, Fluconazole, azithro   - PJP proph day +28   - CMV neg 5/29     4.  GI:  No n/v/d.  - GI prophy- omeprazole.      5.  FEN/Renal: Creat, lytes wnl. Eating well.      6.  Mood:  Cont Paxil     RTC daily for labs, GCSF, f/u, possible transfusions (appts made through Monday for now)       Nadira Ballesteros

## 2019-06-10 ENCOUNTER — APPOINTMENT (OUTPATIENT)
Dept: LAB | Facility: CLINIC | Age: 59
End: 2019-06-10
Attending: INTERNAL MEDICINE
Payer: COMMERCIAL

## 2019-06-10 ENCOUNTER — INFUSION THERAPY VISIT (OUTPATIENT)
Dept: TRANSPLANT | Facility: CLINIC | Age: 59
End: 2019-06-10
Attending: INTERNAL MEDICINE
Payer: COMMERCIAL

## 2019-06-10 VITALS
DIASTOLIC BLOOD PRESSURE: 68 MMHG | OXYGEN SATURATION: 98 % | TEMPERATURE: 96.7 F | SYSTOLIC BLOOD PRESSURE: 118 MMHG | RESPIRATION RATE: 16 BRPM | BODY MASS INDEX: 25.74 KG/M2 | HEART RATE: 110 BPM | WEIGHT: 174.4 LBS

## 2019-06-10 VITALS
SYSTOLIC BLOOD PRESSURE: 113 MMHG | HEART RATE: 106 BPM | DIASTOLIC BLOOD PRESSURE: 77 MMHG | RESPIRATION RATE: 16 BRPM | TEMPERATURE: 97.5 F | OXYGEN SATURATION: 95 %

## 2019-06-10 DIAGNOSIS — R05.9 COUGH: Primary | ICD-10-CM

## 2019-06-10 DIAGNOSIS — C90.01 MULTIPLE MYELOMA IN REMISSION (H): ICD-10-CM

## 2019-06-10 DIAGNOSIS — C90.01 MULTIPLE MYELOMA IN REMISSION (H): Primary | ICD-10-CM

## 2019-06-10 LAB
ABO + RH BLD: ABNORMAL
ABO + RH BLD: ABNORMAL
ANION GAP SERPL CALCULATED.3IONS-SCNC: 7 MMOL/L (ref 3–14)
BASOPHILS # BLD AUTO: 0 10E9/L (ref 0–0.2)
BASOPHILS NFR BLD AUTO: 0 %
BLD GP AB SCN SERPL QL: ABNORMAL
BLD PROD TYP BPU: NORMAL
BLD UNIT ID BPU: 0
BLOOD BANK CMNT PATIENT-IMP: ABNORMAL
BLOOD BANK CMNT PATIENT-IMP: ABNORMAL
BLOOD PRODUCT CODE: NORMAL
BPU ID: NORMAL
BUN SERPL-MCNC: 11 MG/DL (ref 7–30)
CALCIUM SERPL-MCNC: 9 MG/DL (ref 8.5–10.1)
CHLORIDE SERPL-SCNC: 108 MMOL/L (ref 94–109)
CO2 SERPL-SCNC: 24 MMOL/L (ref 20–32)
CREAT SERPL-MCNC: 0.78 MG/DL (ref 0.66–1.25)
DIFFERENTIAL METHOD BLD: ABNORMAL
EOSINOPHIL # BLD AUTO: 0 10E9/L (ref 0–0.7)
EOSINOPHIL NFR BLD AUTO: 0 %
ERYTHROCYTE [DISTWIDTH] IN BLOOD BY AUTOMATED COUNT: 12.8 % (ref 10–15)
GFR SERPL CREATININE-BSD FRML MDRD: >90 ML/MIN/{1.73_M2}
GLUCOSE SERPL-MCNC: 114 MG/DL (ref 70–99)
HCT VFR BLD AUTO: 27.6 % (ref 40–53)
HGB BLD-MCNC: 9.2 G/DL (ref 13.3–17.7)
LYMPHOCYTES # BLD AUTO: 0.3 10E9/L (ref 0.8–5.3)
LYMPHOCYTES NFR BLD AUTO: 31 %
MAGNESIUM SERPL-MCNC: 2 MG/DL (ref 1.6–2.3)
MCH RBC QN AUTO: 30.3 PG (ref 26.5–33)
MCHC RBC AUTO-ENTMCNC: 33.3 G/DL (ref 31.5–36.5)
MCV RBC AUTO: 91 FL (ref 78–100)
MONOCYTES # BLD AUTO: 0.1 10E9/L (ref 0–1.3)
MONOCYTES NFR BLD AUTO: 12.4 %
MYELOCYTES # BLD: 0 10E9/L
MYELOCYTES NFR BLD MANUAL: 0.9 %
NEUTROPHILS # BLD AUTO: 0.6 10E9/L (ref 1.6–8.3)
NEUTROPHILS NFR BLD AUTO: 55.7 %
NRBC # BLD AUTO: 0 10*3/UL
NRBC BLD AUTO-RTO: 2 /100
PLATELET # BLD AUTO: 10 10E9/L (ref 150–450)
PLATELET # BLD EST: ABNORMAL 10*3/UL
POTASSIUM SERPL-SCNC: 3.7 MMOL/L (ref 3.4–5.3)
RBC # BLD AUTO: 3.04 10E12/L (ref 4.4–5.9)
RBC MORPH BLD: NORMAL
SODIUM SERPL-SCNC: 138 MMOL/L (ref 133–144)
SPECIMEN EXP DATE BLD: ABNORMAL
TRANSFUSION STATUS PATIENT QL: NORMAL
TRANSFUSION STATUS PATIENT QL: NORMAL
WBC # BLD AUTO: 1 10E9/L (ref 4–11)

## 2019-06-10 PROCEDURE — 80048 BASIC METABOLIC PNL TOTAL CA: CPT | Performed by: INTERNAL MEDICINE

## 2019-06-10 PROCEDURE — 25000128 H RX IP 250 OP 636: Mod: ZF | Performed by: INTERNAL MEDICINE

## 2019-06-10 PROCEDURE — 96372 THER/PROPH/DIAG INJ SC/IM: CPT

## 2019-06-10 PROCEDURE — 36430 TRANSFUSION BLD/BLD COMPNT: CPT

## 2019-06-10 PROCEDURE — 85025 COMPLETE CBC W/AUTO DIFF WBC: CPT | Performed by: INTERNAL MEDICINE

## 2019-06-10 PROCEDURE — 83735 ASSAY OF MAGNESIUM: CPT | Performed by: INTERNAL MEDICINE

## 2019-06-10 PROCEDURE — P9037 PLATE PHERES LEUKOREDU IRRAD: HCPCS | Performed by: PHYSICIAN ASSISTANT

## 2019-06-10 PROCEDURE — 25000128 H RX IP 250 OP 636: Mod: ZF | Performed by: PHYSICIAN ASSISTANT

## 2019-06-10 RX ORDER — ACETAMINOPHEN 325 MG/1
650 TABLET ORAL EVERY 4 HOURS PRN
Status: CANCELLED
Start: 2019-06-10

## 2019-06-10 RX ORDER — DIPHENHYDRAMINE HCL 25 MG
25 CAPSULE ORAL EVERY 6 HOURS PRN
Status: CANCELLED
Start: 2019-06-10

## 2019-06-10 RX ORDER — ACETAMINOPHEN 325 MG/1
650 TABLET ORAL EVERY 4 HOURS PRN
Status: DISCONTINUED | OUTPATIENT
Start: 2019-06-10 | End: 2019-06-10 | Stop reason: HOSPADM

## 2019-06-10 RX ORDER — HEPARIN SODIUM,PORCINE 10 UNIT/ML
5 VIAL (ML) INTRAVENOUS
Status: CANCELLED | OUTPATIENT
Start: 2019-06-11

## 2019-06-10 RX ORDER — HEPARIN SODIUM,PORCINE 10 UNIT/ML
5 VIAL (ML) INTRAVENOUS
Status: DISCONTINUED | OUTPATIENT
Start: 2019-06-10 | End: 2019-06-10 | Stop reason: HOSPADM

## 2019-06-10 RX ORDER — BENZONATATE 100 MG/1
100 CAPSULE ORAL 3 TIMES DAILY PRN
Qty: 60 CAPSULE | Refills: 0 | Status: ON HOLD | OUTPATIENT
Start: 2019-06-10 | End: 2019-07-25

## 2019-06-10 RX ORDER — DIPHENHYDRAMINE HCL 25 MG
25 CAPSULE ORAL EVERY 6 HOURS PRN
Status: DISCONTINUED | OUTPATIENT
Start: 2019-06-10 | End: 2019-06-10 | Stop reason: HOSPADM

## 2019-06-10 RX ADMIN — HEPARIN, PORCINE (PF) 10 UNIT/ML INTRAVENOUS SYRINGE 5 ML: at 08:03

## 2019-06-10 RX ADMIN — FILGRASTIM 480 MCG: 480 INJECTION, SOLUTION INTRAVENOUS; SUBCUTANEOUS at 09:12

## 2019-06-10 ASSESSMENT — PAIN SCALES - GENERAL: PAINLEVEL: NO PAIN (0)

## 2019-06-10 NOTE — NURSING NOTE
"Oncology Rooming Note    Vonnie 10, 2019 8:41 AM   Angel Yanez is a 59 year old male who presents for:    Chief Complaint   Patient presents with     Blood Draw     labs drawn from cvc by rn.  vital signs taken.     RECHECK     Pt is here for labs and to see NP for MM     Initial Vitals: Blood Pressure 118/68 (BP Location: Right arm, Patient Position: Sitting, Cuff Size: Adult Regular)   Pulse 110   Temperature 96.7  F (35.9  C) (Tympanic)   Respiration 16   Weight 79.1 kg (174 lb 6.4 oz)   Oxygen Saturation 98%   Body Mass Index 25.74 kg/m   Estimated body mass index is 25.74 kg/m  as calculated from the following:    Height as of 6/5/19: 1.753 m (5' 9.02\").    Weight as of this encounter: 79.1 kg (174 lb 6.4 oz). Body surface area is 1.96 meters squared.  No Pain (0) Comment: Data Unavailable   No LMP for male patient.  Allergies reviewed: Yes  Medications reviewed: Yes    Medications: Medication refills not needed today.  Pharmacy name entered into EPIC:    [facility] [place] [facility] [facility] [address]  [facility] - [place], [place] - [address]    Clinical concerns: none       Shoshana Salazar MA              "

## 2019-06-10 NOTE — PROGRESS NOTES
BMT Clinic  Note     Patient ID:  Angel Yanez is a 59 year old male, day +24 s/p 2nd auto for IgB kappa MM .      Diagnosis MM Multiple myeloma  HCT Type Autologous    Prep Regimen Cytoxan  Melphalan   Primary BMT MD Dr. Lucio       INTERVAL  HISTORY   Still coughing, no hemoptysis.  It is mainly when he starts talking.  Cough drops help.  He would like tessalon perles again.  Denies hematuria, blood in stool.  No n/v.  Food still doesn't sound super appealing, but he is eating.  Had one episode of diarrhea yesterday, but no BM so far today.  No fevers.  No longer congested in his head.     Review of Systems: 10 point ROS negative except as noted above.     PHYSICAL EXAM   Blood pressure 118/68, pulse 110, temperature 96.7  F (35.9  C), temperature source Tympanic, resp. rate 16, weight 79.1 kg (174 lb 6.4 oz), SpO2 98 %.    General: NAD, pleasant  Eyes:  sclera anicteric and not injected  Nose/Mouth/Throat: OP moist, no ulcerations   Lungs: CTAB, no increased WOB  Cardiovascular: RRR, no M/R/G   Abdominal/Rectal: +BS, soft, NT, ND   Lymphatics: no edema  Skin: no rash or petechaie  Neuro: non-focal, no gross deficits  Additional Findings: CVC site NT, no drainage.    Labs:  Lab Results   Component Value Date    WBC 1.0 (L) 06/10/2019    ANEU 0.6 (L) 06/10/2019    HGB 9.2 (L) 06/10/2019    HCT 27.6 (L) 06/10/2019    PLT 10 (LL) 06/10/2019     06/10/2019    POTASSIUM 3.7 06/10/2019    CHLORIDE 108 06/10/2019    CO2 24 06/10/2019     (H) 06/10/2019    BUN 11 06/10/2019    CR 0.78 06/10/2019    MAG 2.0 06/10/2019    INR 1.10 06/03/2019    BILITOTAL 0.2 06/03/2019    AST 18 06/03/2019    ALT 23 06/03/2019    ALKPHOS 55 06/03/2019    PROTTOTAL 5.7 (L) 06/03/2019    ALBUMIN 2.6 (L) 06/03/2019          ASSESSMENT/PLAN   Angel Yanez is a 57 yo man D+24 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  BMT/MM:   Slow but steady engraftment; cell dose was only 0.639x10^6. ([facility] [place] - known poor cell  dose as failed chemo-mobilization 1/2019.)   - Continue GCSF until ANC>2500 x2 consecutive days.     2.  HEME: Keep Hgb>8 and plts>20K (hemoptysis). Tylenol/Benadryl premeds. Platelets 6/10.  - Pt has RBC antibodies. Antibody work up completed 5/26.      3.  ID: Afebrile  -  +parainfluenza 3. Ongoing cough, no more hemoptysis.  Cough drops, tessalon pearls.   - prophy with HD ACV, Fluconazole, azithro   - PJP proph day +28; may need pentamidine if counts still low.  - CMV neg 5/29     4.  GI:  No n/v/d.  - GI prophy- omeprazole.      5.  FEN/Renal: Creat, lytes wnl. Eating well.      6.  Mood:  Cont Paxil     RTC daily for labs, GCSF, f/u, possible transfusions (appts made through 6/14).  BMBx tomorrow    Latrice MAXWELL Carrier  6/10/2019

## 2019-06-10 NOTE — NURSING NOTE
"Chief Complaint   Patient presents with     Blood Draw     labs drawn from cvc by rn.  vital signs taken.     Port accessed by RN in lab with 20g 3/4\" power needle, labs drawn, port flushed with saline and heparin, vitals checked, pt checked in for next appointment.  Caitlin Webster RN    "

## 2019-06-10 NOTE — PROGRESS NOTES
Infusion Nursing Note:  Angel Yanez presents today for plts.    Patient seen by provider today: Yes: SHANNON House   present during visit today: Not Applicable.    Note: Patient was given 1 unit platelets today for a level of 10K.  He declined pre meds today as he has done well with transfusions lately without them, and doesn't remember ever having had a reaction in the past.      Intravenous Access:  Hsieh.    Treatment Conditions:  Lab Results   Component Value Date    HGB 9.2 06/10/2019     Lab Results   Component Value Date    WBC 1.0 06/10/2019      Lab Results   Component Value Date    ANEU 0.6 06/10/2019     Lab Results   Component Value Date    PLT 10 06/10/2019      Lab Results   Component Value Date     06/10/2019                   Lab Results   Component Value Date    POTASSIUM 3.7 06/10/2019           Lab Results   Component Value Date    MAG 2.0 06/10/2019            Lab Results   Component Value Date    CR 0.78 06/10/2019                   Lab Results   Component Value Date    ZHEN 9.0 06/10/2019                Lab Results   Component Value Date    BILITOTAL 0.2 06/03/2019           Lab Results   Component Value Date    ALBUMIN 2.6 06/03/2019                    Lab Results   Component Value Date    ALT 23 06/03/2019           Lab Results   Component Value Date    AST 18 06/03/2019           Post Infusion Assessment:  Patient tolerated infusion without incident.       Discharge Plan:   [facility]  Patient will return tomorrow for next appointment.   Patient discharged in stable condition accompanied by: self.  Departure Mode: Ambulatory.    CARLO CASTRO RN

## 2019-06-11 ENCOUNTER — ONCOLOGY VISIT (OUTPATIENT)
Dept: TRANSPLANT | Facility: CLINIC | Age: 59
End: 2019-06-11
Attending: PHYSICIAN ASSISTANT
Payer: COMMERCIAL

## 2019-06-11 ENCOUNTER — OFFICE VISIT (OUTPATIENT)
Dept: TRANSPLANT | Facility: CLINIC | Age: 59
End: 2019-06-11
Attending: STUDENT IN AN ORGANIZED HEALTH CARE EDUCATION/TRAINING PROGRAM
Payer: COMMERCIAL

## 2019-06-11 ENCOUNTER — APPOINTMENT (OUTPATIENT)
Dept: LAB | Facility: CLINIC | Age: 59
End: 2019-06-11
Payer: COMMERCIAL

## 2019-06-11 VITALS
RESPIRATION RATE: 18 BRPM | WEIGHT: 175.71 LBS | DIASTOLIC BLOOD PRESSURE: 84 MMHG | OXYGEN SATURATION: 96 % | BODY MASS INDEX: 25.94 KG/M2 | SYSTOLIC BLOOD PRESSURE: 121 MMHG | HEART RATE: 117 BPM | TEMPERATURE: 97.2 F

## 2019-06-11 DIAGNOSIS — C90.01 MULTIPLE MYELOMA IN REMISSION (H): Primary | ICD-10-CM

## 2019-06-11 DIAGNOSIS — C90.01 MULTIPLE MYELOMA IN REMISSION (H): ICD-10-CM

## 2019-06-11 DIAGNOSIS — C90.00 MULTIPLE MYELOMA NOT HAVING ACHIEVED REMISSION (H): Primary | ICD-10-CM

## 2019-06-11 LAB
ABO + RH BLD: ABNORMAL
ABO + RH BLD: ABNORMAL
ANION GAP SERPL CALCULATED.3IONS-SCNC: 6 MMOL/L (ref 3–14)
BASOPHILS # BLD AUTO: 0 10E9/L (ref 0–0.2)
BASOPHILS NFR BLD AUTO: 0 %
BLD GP AB SCN SERPL QL: ABNORMAL
BLD PROD TYP BPU: ABNORMAL
BLD PROD TYP BPU: NORMAL
BLD PROD TYP BPU: NORMAL
BLD UNIT ID BPU: 0
BLOOD BANK CMNT PATIENT-IMP: ABNORMAL
BLOOD BANK CMNT PATIENT-IMP: ABNORMAL
BLOOD PRODUCT CODE: NORMAL
BPU ID: NORMAL
BUN SERPL-MCNC: 11 MG/DL (ref 7–30)
CALCIUM SERPL-MCNC: 8.8 MG/DL (ref 8.5–10.1)
CHLORIDE SERPL-SCNC: 107 MMOL/L (ref 94–109)
CO2 SERPL-SCNC: 26 MMOL/L (ref 20–32)
COLLECT DURATION TIME UR: 24 H
CREAT SERPL-MCNC: 0.76 MG/DL (ref 0.66–1.25)
CREAT UR-MCNC: 93 MG/DL
DIFFERENTIAL METHOD BLD: ABNORMAL
EOSINOPHIL # BLD AUTO: 0 10E9/L (ref 0–0.7)
EOSINOPHIL NFR BLD AUTO: 0 %
ERYTHROCYTE [DISTWIDTH] IN BLOOD BY AUTOMATED COUNT: 12.9 % (ref 10–15)
GFR SERPL CREATININE-BSD FRML MDRD: >90 ML/MIN/{1.73_M2}
GLUCOSE SERPL-MCNC: 93 MG/DL (ref 70–99)
HCT VFR BLD AUTO: 27 % (ref 40–53)
HGB BLD-MCNC: 9.2 G/DL (ref 13.3–17.7)
LAB SCANNED RESULT: NORMAL
LYMPHOCYTES # BLD AUTO: 0.4 10E9/L (ref 0.8–5.3)
LYMPHOCYTES NFR BLD AUTO: 33 %
MCH RBC QN AUTO: 31.1 PG (ref 26.5–33)
MCHC RBC AUTO-ENTMCNC: 34.1 G/DL (ref 31.5–36.5)
MCV RBC AUTO: 91 FL (ref 78–100)
MONOCYTES # BLD AUTO: 0.1 10E9/L (ref 0–1.3)
MONOCYTES NFR BLD AUTO: 9.6 %
NEUTROPHILS # BLD AUTO: 0.6 10E9/L (ref 1.6–8.3)
NEUTROPHILS NFR BLD AUTO: 56.5 %
NRBC # BLD AUTO: 0 10*3/UL
NRBC BLD AUTO-RTO: 2 /100
NUM BPU REQUESTED: 1
NUM BPU REQUESTED: 1
OVALOCYTES BLD QL SMEAR: SLIGHT
PLATELET # BLD AUTO: 25 10E9/L (ref 150–450)
PLATELET # BLD EST: ABNORMAL 10*3/UL
POIKILOCYTOSIS BLD QL SMEAR: SLIGHT
POTASSIUM SERPL-SCNC: 3.7 MMOL/L (ref 3.4–5.3)
PROMYELOCYTES # BLD MANUAL: 0 10E9/L
PROMYELOCYTES NFR BLD MANUAL: 0.9 %
PROT 24H UR-MRATE: 0.24 G/(24.H) (ref 0.04–0.23)
PROT SERPL-MCNC: 6.6 G/DL (ref 6.8–8.8)
PROT UR-MCNC: 0.14 G/L
PROT/CREAT 24H UR: 0.15 G/G CR (ref 0–0.2)
RBC # BLD AUTO: 2.96 10E12/L (ref 4.4–5.9)
SODIUM SERPL-SCNC: 139 MMOL/L (ref 133–144)
SPECIMEN EXP DATE BLD: ABNORMAL
SPECIMEN VOL UR: 1720 ML
TRANSFUSION STATUS PATIENT QL: NORMAL
TRANSFUSION STATUS PATIENT QL: NORMAL
WBC # BLD AUTO: 1.1 10E9/L (ref 4–11)

## 2019-06-11 PROCEDURE — 40000268 ZZH STATISTIC NO CHARGES: Mod: ZF

## 2019-06-11 PROCEDURE — 82784 ASSAY IGA/IGD/IGG/IGM EACH: CPT | Performed by: PHYSICIAN ASSISTANT

## 2019-06-11 PROCEDURE — 25000128 H RX IP 250 OP 636: Mod: ZF | Performed by: PHYSICIAN ASSISTANT

## 2019-06-11 PROCEDURE — 88237 TISSUE CULTURE BONE MARROW: CPT | Performed by: PHYSICIAN ASSISTANT

## 2019-06-11 PROCEDURE — 40001004 ZZHCL STATISTIC FLOW INT 9-15 ABY TC 88188: Performed by: PHYSICIAN ASSISTANT

## 2019-06-11 PROCEDURE — 88161 CYTOPATH SMEAR OTHER SOURCE: CPT | Performed by: PHYSICIAN ASSISTANT

## 2019-06-11 PROCEDURE — 88280 CHROMOSOME KARYOTYPE STUDY: CPT | Performed by: PHYSICIAN ASSISTANT

## 2019-06-11 PROCEDURE — 84166 PROTEIN E-PHORESIS/URINE/CSF: CPT | Performed by: PHYSICIAN ASSISTANT

## 2019-06-11 PROCEDURE — 88184 FLOWCYTOMETRY/ TC 1 MARKER: CPT | Performed by: PHYSICIAN ASSISTANT

## 2019-06-11 PROCEDURE — 00000058 ZZHCL STATISTIC BONE MARROW ASP PERF TC 38220: Performed by: PHYSICIAN ASSISTANT

## 2019-06-11 PROCEDURE — 83883 ASSAY NEPHELOMETRY NOT SPEC: CPT | Performed by: PHYSICIAN ASSISTANT

## 2019-06-11 PROCEDURE — 84155 ASSAY OF PROTEIN SERUM: CPT | Performed by: PHYSICIAN ASSISTANT

## 2019-06-11 PROCEDURE — 40000611 ZZHCL STATISTIC MORPHOLOGY W/INTERP HEMEPATH TC 85060: Performed by: PHYSICIAN ASSISTANT

## 2019-06-11 PROCEDURE — 88275 CYTOGENETICS 100-300: CPT | Performed by: PHYSICIAN ASSISTANT

## 2019-06-11 PROCEDURE — 88311 DECALCIFY TISSUE: CPT | Performed by: PHYSICIAN ASSISTANT

## 2019-06-11 PROCEDURE — 85025 COMPLETE CBC W/AUTO DIFF WBC: CPT | Performed by: PHYSICIAN ASSISTANT

## 2019-06-11 PROCEDURE — 88185 FLOWCYTOMETRY/TC ADD-ON: CPT | Performed by: PHYSICIAN ASSISTANT

## 2019-06-11 PROCEDURE — 88342 IMHCHEM/IMCYTCHM 1ST ANTB: CPT | Performed by: PHYSICIAN ASSISTANT

## 2019-06-11 PROCEDURE — G0463 HOSPITAL OUTPT CLINIC VISIT: HCPCS | Mod: 25,ZF

## 2019-06-11 PROCEDURE — 86334 IMMUNOFIX E-PHORESIS SERUM: CPT | Performed by: PHYSICIAN ASSISTANT

## 2019-06-11 PROCEDURE — 88341 IMHCHEM/IMCYTCHM EA ADD ANTB: CPT | Performed by: PHYSICIAN ASSISTANT

## 2019-06-11 PROCEDURE — 40000951 ZZHCL STATISTIC BONE MARROW INTERP TC 85097: Performed by: PHYSICIAN ASSISTANT

## 2019-06-11 PROCEDURE — 96372 THER/PROPH/DIAG INJ SC/IM: CPT

## 2019-06-11 PROCEDURE — 88271 CYTOGENETICS DNA PROBE: CPT | Performed by: PHYSICIAN ASSISTANT

## 2019-06-11 PROCEDURE — 40000424 ZZHCL STATISTIC BONE MARROW CORE PERF TC 38221: Performed by: PHYSICIAN ASSISTANT

## 2019-06-11 PROCEDURE — 80048 BASIC METABOLIC PNL TOTAL CA: CPT | Performed by: PHYSICIAN ASSISTANT

## 2019-06-11 PROCEDURE — 88264 CHROMOSOME ANALYSIS 20-25: CPT | Performed by: PHYSICIAN ASSISTANT

## 2019-06-11 PROCEDURE — 86335 IMMUNFIX E-PHORSIS/URINE/CSF: CPT | Performed by: PHYSICIAN ASSISTANT

## 2019-06-11 PROCEDURE — 81050 URINALYSIS VOLUME MEASURE: CPT | Performed by: PHYSICIAN ASSISTANT

## 2019-06-11 PROCEDURE — 84156 ASSAY OF PROTEIN URINE: CPT | Performed by: PHYSICIAN ASSISTANT

## 2019-06-11 PROCEDURE — 84999 UNLISTED CHEMISTRY PROCEDURE: CPT | Performed by: PHYSICIAN ASSISTANT

## 2019-06-11 PROCEDURE — 00000161 ZZHCL STATISTIC H-SPHEME PROCESS B/S: Performed by: PHYSICIAN ASSISTANT

## 2019-06-11 PROCEDURE — 40000795 ZZHCL STATISTIC DNA PROCESS AND HOLD: Performed by: PHYSICIAN ASSISTANT

## 2019-06-11 PROCEDURE — 88305 TISSUE EXAM BY PATHOLOGIST: CPT | Performed by: PHYSICIAN ASSISTANT

## 2019-06-11 PROCEDURE — 84165 PROTEIN E-PHORESIS SERUM: CPT | Performed by: PHYSICIAN ASSISTANT

## 2019-06-11 PROCEDURE — 00000402 ZZHCL STATISTIC TOTAL PROTEIN: Performed by: PHYSICIAN ASSISTANT

## 2019-06-11 PROCEDURE — 38222 DX BONE MARROW BX & ASPIR: CPT | Mod: ZF

## 2019-06-11 RX ORDER — DIPHENHYDRAMINE HCL 25 MG
25 CAPSULE ORAL EVERY 6 HOURS PRN
Status: CANCELLED
Start: 2019-06-11

## 2019-06-11 RX ORDER — HEPARIN SODIUM,PORCINE 10 UNIT/ML
5 VIAL (ML) INTRAVENOUS
Status: CANCELLED | OUTPATIENT
Start: 2019-06-12

## 2019-06-11 RX ORDER — HEPARIN SODIUM,PORCINE 10 UNIT/ML
5 VIAL (ML) INTRAVENOUS
Status: DISCONTINUED | OUTPATIENT
Start: 2019-06-11 | End: 2019-06-11 | Stop reason: HOSPADM

## 2019-06-11 RX ORDER — ACETAMINOPHEN 325 MG/1
650 TABLET ORAL EVERY 4 HOURS PRN
Status: CANCELLED
Start: 2019-06-11

## 2019-06-11 RX ADMIN — HEPARIN, PORCINE (PF) 10 UNIT/ML INTRAVENOUS SYRINGE 5 ML: at 09:11

## 2019-06-11 RX ADMIN — FILGRASTIM 480 MCG: 480 INJECTION, SOLUTION INTRAVENOUS; SUBCUTANEOUS at 09:22

## 2019-06-11 RX ADMIN — HEPARIN, PORCINE (PF) 10 UNIT/ML INTRAVENOUS SYRINGE 5 ML: at 09:58

## 2019-06-11 ASSESSMENT — PAIN SCALES - GENERAL: PAINLEVEL: NO PAIN (0)

## 2019-06-11 NOTE — PROGRESS NOTES
BMT Clinic  Note     Patient ID:  Angel Yanez is a 59 year old male, day +25 s/p 2nd auto for IgB kappa MM .      Diagnosis MM Multiple myeloma  HCT Type Autologous    Prep Regimen Cytoxan  Melphalan   Primary BMT MD Dr. Lucio       INTERVAL  HISTORY   Guille has persistent cough, no hemoptysis x5 days. Needs to  tessalon pearls today. Using cough drops with some relief. Still congested. No fevers or chilling. No edith shortness of breath, just overall feeling deconditioned. Eating normal diet and denies n/v/d. No rashes, bruises or bleeding.     Review of Systems: 10 point ROS negative except as noted above.     PHYSICAL EXAM   VSS  General: NAD, pleasant  Eyes:  sclera anicteric and not injected  Nose/Mouth/Throat: OP moist, no ulcerations   Lungs: CTAB, no increased WOB  Cardiovascular: RRR, no M/R/G   Abdominal/Rectal: +BS, soft, NT, ND   Lymphatics: no edema  Skin: no rash or petechaie  Neuro: non-focal, no gross deficits  Additional Findings: CVC site NT, no drainage.    Labs:  Lab Results   Component Value Date    WBC 1.1 (L) 06/11/2019    ANEU 0.6 (L) 06/11/2019    HGB 9.2 (L) 06/11/2019    HCT 27.0 (L) 06/11/2019    PLT 25 (LL) 06/11/2019     06/11/2019    POTASSIUM 3.7 06/11/2019    CHLORIDE 107 06/11/2019    CO2 26 06/11/2019    GLC 93 06/11/2019    BUN 11 06/11/2019    CR 0.76 06/11/2019    MAG 2.0 06/10/2019    INR 1.10 06/03/2019    BILITOTAL 0.2 06/03/2019    AST 18 06/03/2019    ALT 23 06/03/2019    ALKPHOS 55 06/03/2019    PROTTOTAL 6.6 (L) 06/11/2019    ALBUMIN 2.6 (L) 06/03/2019          ASSESSMENT/PLAN   Angel Yanez is a 59 yo man D+25 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  BMT/MM:   Slow but steady engraftment; cell dose was only 0.639x10^6. ([facility] [place] - known poor cell dose as failed chemo-mobilization 1/2019.)   - Continue GCSF until ANC>2500 x2 consecutive days. Given 6/11     2.  HEME: Keep Hgb>8 and plts>20K (hemoptysis). Tylenol/Benadryl premeds. No  transfusions today 6/11  - Pt has RBC antibodies. Antibody work up completed 5/26.      3.  ID: Afebrile  -  +parainfluenza 3. Ongoing cough, no more hemoptysis.  Cough drops, tessalon pearls.   - prophy with HD ACV, Fluconazole, azithro   - PJP proph day +28; may need pentamidine if counts still low.  - CMV neg 5/29     4.  GI:  No n/v/d.  - GI prophy- omeprazole.      5.  FEN/Renal: Creat, lytes wnl. Eating well.      6.  Mood:  Cont Paxil     Plan: bmbx done today, gcsf given. No transfusions required  RTC daily for labs, GCSF, f/u, possible transfusions (appts made through 6/14).    Mony Pitts PAC  [phone]

## 2019-06-11 NOTE — NURSING NOTE
Pt tolerated BMBX well without IV Versed. Procedure performed by SHANNON Delgado. Following Bx Pt monitored in the Supine position x 30 minutes. Upon discharge pressure drsg C/D/I, Denies Pain, Up Independently. Provider reviewed post procedure instructions with Pt; Pt with no further questions at this time. Pt given copy of Post Procedure Instructional Handout. Pt left clinic ambulatory. Will return to clinic again tomorrow.

## 2019-06-11 NOTE — PROGRESS NOTES
Chief Complaint   Patient presents with     Infusion     possible infusion s/p bmt txp for multiple myeloma     Labs were monitored and no infusion/transfusion needed.

## 2019-06-11 NOTE — PROGRESS NOTES
BMT ONC Adult Bone Marrow Biopsy Procedure Note  June 11, 2019  VSS, reviewed    Learning needs assessment complete within 12 months? YES    DIAGNOSIS: MM     PROCEDURE: Unilateral Bone Marrow Biopsy and Unilateral Aspirate    [place] [facility] [place] [facility]    Patient s identification was positively verified by verbal identification and invasive procedure safety checklist was completed. Informed consent was obtained.  Patient was placed in the prone position and prepped and draped in a sterile manner. Approximately 10 cc of 1% Lidocaine was used over the left posterior iliac spine. Following this a 3 mm incision was made. Trephine bone marrow core(s) was (were) obtained from the IC. Bone marrow aspirates were obtained from the IC. Aspirates were sent for morphology, immunophenotyping, cytogenetics and molecular diagnostics RFLP. A total of approximately 20 ml of marrow was aspirated. Following this procedure a sterile dressing was applied to the bone marrow biopsy site(s). The patient was placed in the supine position to maintain pressure on the biopsy site. Post-procedure wound care instructions were given.     Complications: NO    Pre-procedural pain: 0 out of 10 on the numeric pain rating scale.     Procedural pain: 1 out of 10 on the numeric pain rating scale.     Post-procedural pain assessment: 0 out of 10 on the numeric pain rating scale.     Interventions: NO    Length of procedure:20 minutes or less      Procedure performed by: Mony Pitts PAC  [phone]

## 2019-06-12 ENCOUNTER — ONCOLOGY VISIT (OUTPATIENT)
Dept: TRANSPLANT | Facility: CLINIC | Age: 59
End: 2019-06-12
Attending: PHYSICIAN ASSISTANT
Payer: COMMERCIAL

## 2019-06-12 ENCOUNTER — APPOINTMENT (OUTPATIENT)
Dept: LAB | Facility: CLINIC | Age: 59
End: 2019-06-12
Attending: PHYSICIAN ASSISTANT
Payer: COMMERCIAL

## 2019-06-12 VITALS
HEART RATE: 118 BPM | OXYGEN SATURATION: 98 % | BODY MASS INDEX: 25.74 KG/M2 | WEIGHT: 174.4 LBS | RESPIRATION RATE: 18 BRPM | SYSTOLIC BLOOD PRESSURE: 116 MMHG | DIASTOLIC BLOOD PRESSURE: 79 MMHG | TEMPERATURE: 97.6 F

## 2019-06-12 DIAGNOSIS — C90.01 MULTIPLE MYELOMA IN REMISSION (H): Primary | ICD-10-CM

## 2019-06-12 LAB
ALBUMIN SERPL ELPH-MCNC: 3.7 G/DL (ref 3.7–5.1)
ALBUMIN SERPL-MCNC: 3.4 G/DL (ref 3.4–5)
ALP SERPL-CCNC: 79 U/L (ref 40–150)
ALPHA1 GLOB SERPL ELPH-MCNC: 0.4 G/DL (ref 0.2–0.4)
ALPHA2 GLOB SERPL ELPH-MCNC: 1 G/DL (ref 0.5–0.9)
ALT SERPL W P-5'-P-CCNC: 17 U/L (ref 0–70)
ANION GAP SERPL CALCULATED.3IONS-SCNC: 8 MMOL/L (ref 3–14)
AST SERPL W P-5'-P-CCNC: 15 U/L (ref 0–45)
B-GLOBULIN SERPL ELPH-MCNC: 0.6 G/DL (ref 0.6–1)
BASOPHILS # BLD AUTO: 0 10E9/L (ref 0–0.2)
BASOPHILS NFR BLD AUTO: 0 %
BILIRUB SERPL-MCNC: 0.4 MG/DL (ref 0.2–1.3)
BLD PROD TYP BPU: NORMAL
BLD UNIT ID BPU: 0
BLOOD PRODUCT CODE: NORMAL
BPU ID: NORMAL
BUN SERPL-MCNC: 12 MG/DL (ref 7–30)
CALCIUM SERPL-MCNC: 8.7 MG/DL (ref 8.5–10.1)
CHLORIDE SERPL-SCNC: 106 MMOL/L (ref 94–109)
CO2 SERPL-SCNC: 26 MMOL/L (ref 20–32)
COPATH REPORT: NORMAL
COPATH REPORT: NORMAL
CREAT SERPL-MCNC: 0.76 MG/DL (ref 0.66–1.25)
DIFFERENTIAL METHOD BLD: ABNORMAL
EOSINOPHIL # BLD AUTO: 0.1 10E9/L (ref 0–0.7)
EOSINOPHIL NFR BLD AUTO: 4.4 %
ERYTHROCYTE [DISTWIDTH] IN BLOOD BY AUTOMATED COUNT: 12.8 % (ref 10–15)
GAMMA GLOB SERPL ELPH-MCNC: 0.5 G/DL (ref 0.7–1.6)
GFR SERPL CREATININE-BSD FRML MDRD: >90 ML/MIN/{1.73_M2}
GLUCOSE SERPL-MCNC: 96 MG/DL (ref 70–99)
HCT VFR BLD AUTO: 26 % (ref 40–53)
HGB BLD-MCNC: 8.9 G/DL (ref 13.3–17.7)
IGA SERPL-MCNC: 41 MG/DL (ref 70–380)
IGG SERPL-MCNC: 572 MG/DL (ref 695–1620)
IGM SERPL-MCNC: 20 MG/DL (ref 60–265)
LYMPHOCYTES # BLD AUTO: 0.7 10E9/L (ref 0.8–5.3)
LYMPHOCYTES NFR BLD AUTO: 37.2 %
M PROTEIN SERPL ELPH-MCNC: 0.1 G/DL
MCH RBC QN AUTO: 30.8 PG (ref 26.5–33)
MCHC RBC AUTO-ENTMCNC: 34.2 G/DL (ref 31.5–36.5)
MCV RBC AUTO: 90 FL (ref 78–100)
MISCELLANEOUS TEST: NORMAL
MONOCYTES # BLD AUTO: 0.1 10E9/L (ref 0–1.3)
MONOCYTES NFR BLD AUTO: 6.2 %
MYELOCYTES # BLD: 0 10E9/L
MYELOCYTES NFR BLD MANUAL: 0.9 %
NEUTROPHILS # BLD AUTO: 0.9 10E9/L (ref 1.6–8.3)
NEUTROPHILS NFR BLD AUTO: 51.3 %
PLATELET # BLD AUTO: 21 10E9/L (ref 150–450)
PLATELET # BLD EST: ABNORMAL 10*3/UL
POTASSIUM SERPL-SCNC: 3.6 MMOL/L (ref 3.4–5.3)
PROT ELPH PNL UR ELPH: NORMAL
PROT PATTERN SERPL ELPH-IMP: ABNORMAL
PROT PATTERN SERPL IFE-IMP: NORMAL
PROT PATTERN UR ELPH-IMP: NORMAL
PROT SERPL-MCNC: 6.7 G/DL (ref 6.8–8.8)
RBC # BLD AUTO: 2.89 10E12/L (ref 4.4–5.9)
RBC MORPH BLD: NORMAL
SODIUM SERPL-SCNC: 140 MMOL/L (ref 133–144)
TRANSFUSION STATUS PATIENT QL: NORMAL
TRANSFUSION STATUS PATIENT QL: NORMAL
WBC # BLD AUTO: 1.8 10E9/L (ref 4–11)

## 2019-06-12 PROCEDURE — 85025 COMPLETE CBC W/AUTO DIFF WBC: CPT | Performed by: STUDENT IN AN ORGANIZED HEALTH CARE EDUCATION/TRAINING PROGRAM

## 2019-06-12 PROCEDURE — G0463 HOSPITAL OUTPT CLINIC VISIT: HCPCS | Mod: ZF

## 2019-06-12 PROCEDURE — 40000268 ZZH STATISTIC NO CHARGES: Mod: ZF

## 2019-06-12 PROCEDURE — 25000128 H RX IP 250 OP 636: Mod: ZF | Performed by: PHYSICIAN ASSISTANT

## 2019-06-12 PROCEDURE — 80053 COMPREHEN METABOLIC PANEL: CPT | Performed by: STUDENT IN AN ORGANIZED HEALTH CARE EDUCATION/TRAINING PROGRAM

## 2019-06-12 PROCEDURE — 96372 THER/PROPH/DIAG INJ SC/IM: CPT

## 2019-06-12 RX ORDER — HEPARIN SODIUM,PORCINE 10 UNIT/ML
5 VIAL (ML) INTRAVENOUS
Status: DISCONTINUED | OUTPATIENT
Start: 2019-06-12 | End: 2019-06-12 | Stop reason: HOSPADM

## 2019-06-12 RX ORDER — HEPARIN SODIUM,PORCINE 10 UNIT/ML
5 VIAL (ML) INTRAVENOUS
Status: CANCELLED | OUTPATIENT
Start: 2019-06-13

## 2019-06-12 RX ORDER — DIPHENHYDRAMINE HCL 25 MG
25 CAPSULE ORAL EVERY 6 HOURS PRN
Status: CANCELLED
Start: 2019-06-12

## 2019-06-12 RX ORDER — ACETAMINOPHEN 325 MG/1
650 TABLET ORAL EVERY 4 HOURS PRN
Status: CANCELLED
Start: 2019-06-12

## 2019-06-12 RX ADMIN — HEPARIN, PORCINE (PF) 10 UNIT/ML INTRAVENOUS SYRINGE 5 ML: at 09:27

## 2019-06-12 RX ADMIN — FILGRASTIM 480 MCG: 480 INJECTION, SOLUTION INTRAVENOUS; SUBCUTANEOUS at 10:20

## 2019-06-12 ASSESSMENT — PAIN SCALES - GENERAL: PAINLEVEL: NO PAIN (0)

## 2019-06-12 NOTE — NURSING NOTE
Neupogen 480 MCG. Sub-Q injection right arm  . See MAR. Pt. Tolerated well.    Kathy VALLEOJA

## 2019-06-12 NOTE — NURSING NOTE
"Oncology Rooming Note    June 12, 2019 9:56 AM   Angel Yanez is a 59 year old male who presents for:    Chief Complaint   Patient presents with     Blood Draw     Labs drawn via CVC by RN in lab. Lines flushed and hep locked. VS taken.     RECHECK     RTN- MM     Initial Vitals: /79 (BP Location: Right arm, Patient Position: Sitting, Cuff Size: Adult Regular)   Pulse 118   Temp 97.6  F (36.4  C) (Oral)   Resp 18   Wt 79.1 kg (174 lb 6.4 oz)   SpO2 98%   BMI 25.74 kg/m   Estimated body mass index is 25.74 kg/m  as calculated from the following:    Height as of 6/5/19: 1.753 m (5' 9.02\").    Weight as of this encounter: 79.1 kg (174 lb 6.4 oz). Body surface area is 1.96 meters squared.  No Pain (0) Comment: Data Unavailable   No LMP for male patient.  Allergies reviewed: Yes  Medications reviewed: Yes    Medications: Medication refills not needed today.  Pharmacy name entered into EPIC:    [facility] - [place], [place] - [address]  [facility] - [place], [place] - [address] [facility]    Clinical concerns: None       Kathy Perez CMA              "

## 2019-06-12 NOTE — PROGRESS NOTES
BMT Clinic  Note     Patient ID:  Angel Yanez is a 59 year old male, day +26 s/p 2nd auto for IgB kappa MM .      Diagnosis MM Multiple myeloma  HCT Type Autologous    Prep Regimen Cytoxan  Melphalan   Primary BMT MD Dr. Lucio       INTERVAL  HISTORY   Guille is sucking on a cough drop. Cough is stable. Also taking tessalon pearls. No n/v/d. No other complaints today.    Review of Systems: 10 point ROS negative except as noted above.     PHYSICAL EXAM   Blood pressure 116/79, pulse 118, temperature 97.6  F (36.4  C), temperature source Oral, resp. rate 18, weight 79.1 kg (174 lb 6.4 oz), SpO2 98 %.    General: NAD, pleasant  Eyes:  sclera anicteric and not injected  Nose/Mouth/Throat: OP moist, no ulcerations   Lungs: CTAB, no increased WOB  Cardiovascular: RRR, no M/R/G   Abdominal/Rectal: +BS, soft, NT, ND   Lymphatics: no edema  Skin: no rash or petechaie  Neuro: non-focal, no gross deficits  Additional Findings: CVC site NT, no drainage.    Labs:  Lab Results   Component Value Date    WBC 1.8 (L) 06/12/2019    ANEU 0.6 (L) 06/11/2019    HGB 8.9 (L) 06/12/2019    HCT 26.0 (L) 06/12/2019    PLT 21 (LL) 06/12/2019     06/11/2019    POTASSIUM 3.7 06/11/2019    CHLORIDE 107 06/11/2019    CO2 26 06/11/2019    GLC 93 06/11/2019    BUN 11 06/11/2019    CR 0.76 06/11/2019    MAG 2.0 06/10/2019    INR 1.10 06/03/2019    BILITOTAL 0.2 06/03/2019    AST 18 06/03/2019    ALT 23 06/03/2019    ALKPHOS 55 06/03/2019    PROTTOTAL 6.6 (L) 06/11/2019    ALBUMIN 2.6 (L) 06/03/2019          ASSESSMENT/PLAN   Angel Yanez is a 57 yo man D+26 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  BMT/MM:   Slow but steady engraftment; cell dose was only 0.639x10^6. ([facility] [place] - known poor cell dose as failed chemo-mobilization 1/2019.)   - Continue GCSF until ANC>2500 x2 consecutive days. Given 6/12     2.  HEME: Keep Hgb>8 and plts>20K (hemoptysis). Tylenol/Benadryl premeds. No transfusions today 6/12  - Pt has RBC  antibodies. Antibody work up completed 5/26.      3.  ID: Afebrile  -  +parainfluenza 3. Ongoing cough, no more hemoptysis.  Cough drops, tessalon pearls.   - prophy with HD ACV, Fluconazole, azithro   - PJP proph day +28; may need pentamidine if counts still low.  - CMV neg 5/29     4.  GI:  No n/v/d.  - GI prophy- omeprazole.      5.  FEN/Renal: Creat, lytes wnl. Eating well.      6.  Mood:  Cont Paxil     Plan: gcsf given. No transfusions required  RTC daily for labs, GCSF, f/u, possible transfusions (appts made through 6/14).    Mariana Jolly NP

## 2019-06-12 NOTE — PROGRESS NOTES
Chief Complaint   Patient presents with     Infusion     possible transfusion s/p bmt txp for multiple myeloma     Labs were monitored and no infusion/transfusion needed.

## 2019-06-12 NOTE — PROGRESS NOTES
BMT Clinic  Note     Patient ID:  Angel Yanez is a 59 year old male, day +27 s/p 2nd auto for IgB kappa MM .      Diagnosis MM Multiple myeloma  HCT Type Autologous    Prep Regimen Cytoxan  Melphalan   Primary BMT MD Dr. Lucio       INTERVAL  HISTORY   Guille continues to be bothered by cough, mostly when he is talking.  Tired, but otherwise feeling good. Eating with no N/V/D.  Afebrfile.  No pain or bleeding.      Review of Systems: 10 point ROS negative except as noted above.     PHYSICAL EXAM   Blood pressure 115/72, pulse 115, temperature 97.8  F (36.6  C), resp. rate 16, weight 78.9 kg (174 lb), SpO2 98 %.    General: NAD, pleasant  Eyes:  sclera anicteric and not injected  Nose/Mouth/Throat: OP moist, no ulcerations   Lungs: CTAB, no increased WOB  Cardiovascular: RRR, no M/R/G   Abdominal/Rectal: +BS, soft, NT, ND   Lymphatics: no edema  Skin: no rash or petechaie  Neuro: non-focal, no gross deficits  Additional Findings: CVC site NT, no drainage.    Labs:  Lab Results   Component Value Date    WBC 1.9 (L) 06/13/2019    ANEU 1.0 (L) 06/13/2019    HGB 8.6 (L) 06/13/2019    HCT 24.6 (L) 06/13/2019    PLT 13 (LL) 06/13/2019     06/13/2019    POTASSIUM 3.9 06/13/2019    CHLORIDE 108 06/13/2019    CO2 25 06/13/2019    GLC 98 06/13/2019    BUN 12 06/13/2019    CR 0.75 06/13/2019    MAG 2.1 06/13/2019    INR 1.10 06/03/2019    BILITOTAL 0.4 06/12/2019    AST 15 06/12/2019    ALT 17 06/12/2019    ALKPHOS 79 06/12/2019    PROTTOTAL 6.7 (L) 06/12/2019    ALBUMIN 3.4 06/12/2019          ASSESSMENT/PLAN   Angel Yanez is a 57 yo man D+27 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  BMT/MM:   Slow but steady engraftment; cell dose was only 0.639x10^6. ([facility] [place] - known poor cell dose as failed chemo-mobilization 1/2019.)   - Continue GCSF until ANC>2500 x2 consecutive days.      2.  HEME: Keep Hgb>8 and plts>20K (hemoptysis). Tylenol/Benadryl premeds. Transfuse plt today  - Pt has RBC antibodies.  Antibody work up completed 5/26.      3.  ID: Afebrile  -  +parainfluenza 3. Ongoing cough, no more hemoptysis.  Cough drops, tessalon pearls.   - prophy with HD ACV, Fluconazole, azithro   - PJP proph day +28; may need pentamidine if counts still low.  - CMV neg 5/29     4.  GI:  No n/v/d.  - GI prophy- omeprazole.      5.  FEN/Renal: Creat, lytes wnl. Eating well.      6.  Mood:  Cont Paxil     RTC daily for labs, GCSF, f/u, possible transfusions     Karla Manzo

## 2019-06-13 ENCOUNTER — APPOINTMENT (OUTPATIENT)
Dept: LAB | Facility: CLINIC | Age: 59
End: 2019-06-13
Attending: PHYSICIAN ASSISTANT
Payer: COMMERCIAL

## 2019-06-13 ENCOUNTER — ONCOLOGY VISIT (OUTPATIENT)
Dept: TRANSPLANT | Facility: CLINIC | Age: 59
End: 2019-06-13
Attending: PHYSICIAN ASSISTANT
Payer: COMMERCIAL

## 2019-06-13 VITALS
SYSTOLIC BLOOD PRESSURE: 101 MMHG | DIASTOLIC BLOOD PRESSURE: 71 MMHG | OXYGEN SATURATION: 97 % | HEART RATE: 103 BPM | TEMPERATURE: 98.7 F | RESPIRATION RATE: 16 BRPM

## 2019-06-13 VITALS
HEART RATE: 115 BPM | OXYGEN SATURATION: 98 % | TEMPERATURE: 97.8 F | BODY MASS INDEX: 25.68 KG/M2 | WEIGHT: 174 LBS | RESPIRATION RATE: 16 BRPM | SYSTOLIC BLOOD PRESSURE: 115 MMHG | DIASTOLIC BLOOD PRESSURE: 72 MMHG

## 2019-06-13 DIAGNOSIS — C90.01 MULTIPLE MYELOMA IN REMISSION (H): Primary | ICD-10-CM

## 2019-06-13 LAB
ANION GAP SERPL CALCULATED.3IONS-SCNC: 6 MMOL/L (ref 3–14)
BASOPHILS # BLD AUTO: 0 10E9/L (ref 0–0.2)
BASOPHILS NFR BLD AUTO: 0 %
BLD PROD TYP BPU: NORMAL
BLD PROD TYP BPU: NORMAL
BLD UNIT ID BPU: 0
BLD UNIT ID BPU: 0
BLOOD PRODUCT CODE: NORMAL
BLOOD PRODUCT CODE: NORMAL
BPU ID: NORMAL
BPU ID: NORMAL
BUN SERPL-MCNC: 12 MG/DL (ref 7–30)
CALCIUM SERPL-MCNC: 8.7 MG/DL (ref 8.5–10.1)
CHLORIDE SERPL-SCNC: 108 MMOL/L (ref 94–109)
CO2 SERPL-SCNC: 25 MMOL/L (ref 20–32)
COPATH REPORT: NORMAL
CREAT SERPL-MCNC: 0.75 MG/DL (ref 0.66–1.25)
DIFFERENTIAL METHOD BLD: ABNORMAL
EOSINOPHIL # BLD AUTO: 0 10E9/L (ref 0–0.7)
EOSINOPHIL NFR BLD AUTO: 0 %
ERYTHROCYTE [DISTWIDTH] IN BLOOD BY AUTOMATED COUNT: 12.9 % (ref 10–15)
GFR SERPL CREATININE-BSD FRML MDRD: >90 ML/MIN/{1.73_M2}
GLUCOSE SERPL-MCNC: 98 MG/DL (ref 70–99)
HCT VFR BLD AUTO: 24.6 % (ref 40–53)
HGB BLD-MCNC: 8.6 G/DL (ref 13.3–17.7)
LYMPHOCYTES # BLD AUTO: 0.7 10E9/L (ref 0.8–5.3)
LYMPHOCYTES NFR BLD AUTO: 36.6 %
Lab: NORMAL
Lab: NORMAL
MAGNESIUM SERPL-MCNC: 2.1 MG/DL (ref 1.6–2.3)
MCH RBC QN AUTO: 31.2 PG (ref 26.5–33)
MCHC RBC AUTO-ENTMCNC: 35 G/DL (ref 31.5–36.5)
MCV RBC AUTO: 89 FL (ref 78–100)
MONOCYTES # BLD AUTO: 0.2 10E9/L (ref 0–1.3)
MONOCYTES NFR BLD AUTO: 9.8 %
MYELOCYTES # BLD: 0.1 10E9/L
MYELOCYTES NFR BLD MANUAL: 2.7 %
NEUTROPHILS # BLD AUTO: 1 10E9/L (ref 1.6–8.3)
NEUTROPHILS NFR BLD AUTO: 50.9 %
NRBC # BLD AUTO: 0 10*3/UL
NRBC BLD AUTO-RTO: 2 /100
PLATELET # BLD AUTO: 13 10E9/L (ref 150–450)
PLATELET # BLD EST: ABNORMAL 10*3/UL
POTASSIUM SERPL-SCNC: 3.9 MMOL/L (ref 3.4–5.3)
RBC # BLD AUTO: 2.76 10E12/L (ref 4.4–5.9)
RBC MORPH BLD: NORMAL
RESULT: ABNORMAL
SEND OUTS MISC TEST CODE: ABNORMAL
SEND OUTS MISC TEST SPECIMEN: ABNORMAL
SODIUM SERPL-SCNC: 139 MMOL/L (ref 133–144)
SPECIMEN SOURCE: NORMAL
SPECIMEN SOURCE: NORMAL
TEST NAME: ABNORMAL
TRANSFUSION STATUS PATIENT QL: NORMAL
WBC # BLD AUTO: 1.9 10E9/L (ref 4–11)
YEAST SPEC QL CULT: NO GROWTH
YEAST SPEC QL CULT: NO GROWTH

## 2019-06-13 PROCEDURE — 25000128 H RX IP 250 OP 636: Mod: ZF | Performed by: PHYSICIAN ASSISTANT

## 2019-06-13 PROCEDURE — 36430 TRANSFUSION BLD/BLD COMPNT: CPT

## 2019-06-13 PROCEDURE — G0463 HOSPITAL OUTPT CLINIC VISIT: HCPCS | Mod: ZF

## 2019-06-13 PROCEDURE — 83735 ASSAY OF MAGNESIUM: CPT | Performed by: NURSE PRACTITIONER

## 2019-06-13 PROCEDURE — 86902 BLOOD TYPE ANTIGEN DONOR EA: CPT | Performed by: PHYSICIAN ASSISTANT

## 2019-06-13 PROCEDURE — 86901 BLOOD TYPING SEROLOGIC RH(D): CPT | Performed by: PHYSICIAN ASSISTANT

## 2019-06-13 PROCEDURE — P9037 PLATE PHERES LEUKOREDU IRRAD: HCPCS | Performed by: PHYSICIAN ASSISTANT

## 2019-06-13 PROCEDURE — 80048 BASIC METABOLIC PNL TOTAL CA: CPT | Performed by: NURSE PRACTITIONER

## 2019-06-13 PROCEDURE — 86850 RBC ANTIBODY SCREEN: CPT | Performed by: PHYSICIAN ASSISTANT

## 2019-06-13 PROCEDURE — 96372 THER/PROPH/DIAG INJ SC/IM: CPT

## 2019-06-13 PROCEDURE — 85025 COMPLETE CBC W/AUTO DIFF WBC: CPT | Performed by: NURSE PRACTITIONER

## 2019-06-13 PROCEDURE — 86900 BLOOD TYPING SEROLOGIC ABO: CPT | Performed by: PHYSICIAN ASSISTANT

## 2019-06-13 RX ORDER — HEPARIN SODIUM,PORCINE 10 UNIT/ML
5 VIAL (ML) INTRAVENOUS
Status: CANCELLED | OUTPATIENT
Start: 2019-06-14

## 2019-06-13 RX ORDER — DIPHENHYDRAMINE HCL 25 MG
25 CAPSULE ORAL EVERY 6 HOURS PRN
Status: CANCELLED
Start: 2019-06-13

## 2019-06-13 RX ORDER — ACETAMINOPHEN 325 MG/1
650 TABLET ORAL EVERY 4 HOURS PRN
Status: CANCELLED
Start: 2019-06-13

## 2019-06-13 RX ORDER — HEPARIN SODIUM,PORCINE 10 UNIT/ML
5 VIAL (ML) INTRAVENOUS
Status: DISCONTINUED | OUTPATIENT
Start: 2019-06-13 | End: 2019-06-13 | Stop reason: HOSPADM

## 2019-06-13 RX ADMIN — HEPARIN, PORCINE (PF) 10 UNIT/ML INTRAVENOUS SYRINGE 5 ML: at 10:46

## 2019-06-13 RX ADMIN — HEPARIN, PORCINE (PF) 10 UNIT/ML INTRAVENOUS SYRINGE 5 ML: at 10:44

## 2019-06-13 RX ADMIN — FILGRASTIM 480 MCG: 480 INJECTION, SOLUTION INTRAVENOUS; SUBCUTANEOUS at 11:36

## 2019-06-13 ASSESSMENT — PAIN SCALES - GENERAL: PAINLEVEL: NO PAIN (0)

## 2019-06-13 NOTE — PROGRESS NOTES
Infusion Nursing Note:  Angel Yanez presents today for platelets, GCSF.    Patient seen by provider today: Yes: Karla Manzo NP   present during visit today: Not Applicable.    Note: Pt given 1u plts (declined pre-meds) and subcutaneous GCSF.  Tolerated procedures without incident.    Intravenous Access:  Hsieh.    Treatment Conditions:  Lab Results   Component Value Date    HGB 8.6 06/13/2019     Lab Results   Component Value Date    WBC 1.9 06/13/2019      Lab Results   Component Value Date    ANEU 1.0 06/13/2019     Lab Results   Component Value Date    PLT 13 06/13/2019      Results reviewed, labs MET treatment parameters, ok to proceed with treatment.      Post Infusion Assessment:  Patient tolerated infusion without incident.  Blood return noted pre and post infusion.  Site patent and intact, free from redness, edema or discomfort.  No evidence of extravasations.       Discharge Plan:   Discharge instructions reviewed with: Patient.  Patient and/or family verbalized understanding of discharge instructions and all questions answered.  Patient discharged in stable condition accompanied by: self.  Departure Mode: Ambulatory.    Rosana Brown RN                    \

## 2019-06-13 NOTE — NURSING NOTE
"Oncology Rooming Note    June 13, 2019 10:58 AM   Angel Yanez is a 59 year old male who presents for:    Chief Complaint   Patient presents with     Labs Only     cvc, heparin locked, vitals checked     RECHECK     RTN- MM     Initial Vitals: /72   Pulse 115   Temp 97.8  F (36.6  C)   Resp 16   Wt 78.9 kg (174 lb)   SpO2 98%   BMI 25.68 kg/m   Estimated body mass index is 25.68 kg/m  as calculated from the following:    Height as of 6/5/19: 1.753 m (5' 9.02\").    Weight as of this encounter: 78.9 kg (174 lb). Body surface area is 1.96 meters squared.  No Pain (0) Comment: Data Unavailable   No LMP for male patient.  Allergies reviewed: Yes  Medications reviewed: Yes    Medications: Medication refills not needed today.  Pharmacy name entered into EPIC:    [facility] - [place], [place] - [address] [facility]ACY [facility] - [place], [place] - [address]    Clinical concerns: None       Kathy Perez CMA              "

## 2019-06-13 NOTE — NURSING NOTE
Chief Complaint   Patient presents with     Labs Only     cvc, heparin locked, vitals checked     Eloisa King RN on 6/13/2019 at 10:48 AM

## 2019-06-14 ENCOUNTER — APPOINTMENT (OUTPATIENT)
Dept: LAB | Facility: CLINIC | Age: 59
End: 2019-06-14
Attending: PHYSICIAN ASSISTANT
Payer: COMMERCIAL

## 2019-06-14 ENCOUNTER — INFUSION THERAPY VISIT (OUTPATIENT)
Dept: TRANSPLANT | Facility: CLINIC | Age: 59
End: 2019-06-14
Attending: PHYSICIAN ASSISTANT
Payer: COMMERCIAL

## 2019-06-14 ENCOUNTER — OFFICE VISIT (OUTPATIENT)
Dept: TRANSPLANT | Facility: CLINIC | Age: 59
End: 2019-06-14
Attending: INTERNAL MEDICINE
Payer: COMMERCIAL

## 2019-06-14 VITALS
TEMPERATURE: 98.7 F | RESPIRATION RATE: 16 BRPM | BODY MASS INDEX: 25.59 KG/M2 | SYSTOLIC BLOOD PRESSURE: 114 MMHG | OXYGEN SATURATION: 95 % | DIASTOLIC BLOOD PRESSURE: 72 MMHG | HEART RATE: 123 BPM | WEIGHT: 173.4 LBS

## 2019-06-14 VITALS
DIASTOLIC BLOOD PRESSURE: 81 MMHG | TEMPERATURE: 99 F | OXYGEN SATURATION: 98 % | HEART RATE: 84 BPM | SYSTOLIC BLOOD PRESSURE: 116 MMHG | RESPIRATION RATE: 16 BRPM

## 2019-06-14 DIAGNOSIS — C90.01 MULTIPLE MYELOMA IN REMISSION (H): Primary | ICD-10-CM

## 2019-06-14 LAB
ABO + RH BLD: ABNORMAL
ABO + RH BLD: ABNORMAL
ANION GAP SERPL CALCULATED.3IONS-SCNC: 6 MMOL/L (ref 3–14)
BASOPHILS # BLD AUTO: 0 10E9/L (ref 0–0.2)
BASOPHILS NFR BLD AUTO: 0 %
BLD GP AB SCN SERPL QL: ABNORMAL
BLD PROD TYP BPU: ABNORMAL
BLD PROD TYP BPU: NORMAL
BLD UNIT ID BPU: 0
BLOOD BANK CMNT PATIENT-IMP: ABNORMAL
BLOOD BANK CMNT PATIENT-IMP: ABNORMAL
BLOOD PRODUCT CODE: NORMAL
BPU ID: NORMAL
BUN SERPL-MCNC: 10 MG/DL (ref 7–30)
CALCIUM SERPL-MCNC: 9 MG/DL (ref 8.5–10.1)
CHLORIDE SERPL-SCNC: 106 MMOL/L (ref 94–109)
CO2 SERPL-SCNC: 27 MMOL/L (ref 20–32)
COPATH REPORT: NORMAL
CREAT SERPL-MCNC: 0.73 MG/DL (ref 0.66–1.25)
DIFFERENTIAL METHOD BLD: ABNORMAL
EOSINOPHIL # BLD AUTO: 0 10E9/L (ref 0–0.7)
EOSINOPHIL NFR BLD AUTO: 0.8 %
ERYTHROCYTE [DISTWIDTH] IN BLOOD BY AUTOMATED COUNT: 13 % (ref 10–15)
GFR SERPL CREATININE-BSD FRML MDRD: >90 ML/MIN/{1.73_M2}
GLUCOSE SERPL-MCNC: 106 MG/DL (ref 70–99)
HCT VFR BLD AUTO: 24.3 % (ref 40–53)
HGB BLD-MCNC: 8.4 G/DL (ref 13.3–17.7)
LYMPHOCYTES # BLD AUTO: 0.8 10E9/L (ref 0.8–5.3)
LYMPHOCYTES NFR BLD AUTO: 38.6 %
Lab: NORMAL
Lab: NORMAL
MAGNESIUM SERPL-MCNC: 2 MG/DL (ref 1.6–2.3)
MCH RBC QN AUTO: 30.8 PG (ref 26.5–33)
MCHC RBC AUTO-ENTMCNC: 34.6 G/DL (ref 31.5–36.5)
MCV RBC AUTO: 89 FL (ref 78–100)
MONOCYTES # BLD AUTO: 0.1 10E9/L (ref 0–1.3)
MONOCYTES NFR BLD AUTO: 4.4 %
MYELOCYTES # BLD: 0 10E9/L
MYELOCYTES NFR BLD MANUAL: 0.9 %
NEUTROPHILS # BLD AUTO: 1.1 10E9/L (ref 1.6–8.3)
NEUTROPHILS NFR BLD AUTO: 54.4 %
NUM BPU REQUESTED: 2
OVALOCYTES BLD QL SMEAR: SLIGHT
PLATELET # BLD AUTO: 14 10E9/L (ref 150–450)
PLATELET # BLD EST: ABNORMAL 10*3/UL
POIKILOCYTOSIS BLD QL SMEAR: SLIGHT
POTASSIUM SERPL-SCNC: 3.6 MMOL/L (ref 3.4–5.3)
PROMYELOCYTES # BLD MANUAL: 0 10E9/L
PROMYELOCYTES NFR BLD MANUAL: 0.9 %
RBC # BLD AUTO: 2.73 10E12/L (ref 4.4–5.9)
SODIUM SERPL-SCNC: 139 MMOL/L (ref 133–144)
SPECIMEN EXP DATE BLD: ABNORMAL
SPECIMEN SOURCE: NORMAL
SPECIMEN SOURCE: NORMAL
TRANSFUSION STATUS PATIENT QL: NORMAL
TRANSFUSION STATUS PATIENT QL: NORMAL
WBC # BLD AUTO: 2.1 10E9/L (ref 4–11)
YEAST SPEC QL CULT: NO GROWTH
YEAST SPEC QL CULT: NO GROWTH

## 2019-06-14 PROCEDURE — 25000128 H RX IP 250 OP 636: Mod: ZF | Performed by: PHYSICIAN ASSISTANT

## 2019-06-14 PROCEDURE — 85025 COMPLETE CBC W/AUTO DIFF WBC: CPT | Performed by: NURSE PRACTITIONER

## 2019-06-14 PROCEDURE — 96372 THER/PROPH/DIAG INJ SC/IM: CPT

## 2019-06-14 PROCEDURE — P9037 PLATE PHERES LEUKOREDU IRRAD: HCPCS | Performed by: PHYSICIAN ASSISTANT

## 2019-06-14 PROCEDURE — G0463 HOSPITAL OUTPT CLINIC VISIT: HCPCS | Mod: ZF

## 2019-06-14 PROCEDURE — 36430 TRANSFUSION BLD/BLD COMPNT: CPT

## 2019-06-14 PROCEDURE — 80048 BASIC METABOLIC PNL TOTAL CA: CPT | Performed by: NURSE PRACTITIONER

## 2019-06-14 PROCEDURE — 83735 ASSAY OF MAGNESIUM: CPT | Performed by: NURSE PRACTITIONER

## 2019-06-14 PROCEDURE — 25000128 H RX IP 250 OP 636: Mod: ZF | Performed by: INTERNAL MEDICINE

## 2019-06-14 RX ORDER — HEPARIN SODIUM,PORCINE 10 UNIT/ML
5 VIAL (ML) INTRAVENOUS ONCE
Status: COMPLETED | OUTPATIENT
Start: 2019-06-14 | End: 2019-06-14

## 2019-06-14 RX ORDER — ACETAMINOPHEN 325 MG/1
650 TABLET ORAL EVERY 4 HOURS PRN
Status: CANCELLED
Start: 2019-06-14

## 2019-06-14 RX ORDER — HEPARIN SODIUM,PORCINE 10 UNIT/ML
5 VIAL (ML) INTRAVENOUS
Status: CANCELLED | OUTPATIENT
Start: 2019-06-15

## 2019-06-14 RX ORDER — DIPHENHYDRAMINE HCL 25 MG
25 CAPSULE ORAL EVERY 6 HOURS PRN
Status: CANCELLED
Start: 2019-06-14

## 2019-06-14 RX ADMIN — HEPARIN, PORCINE (PF) 10 UNIT/ML INTRAVENOUS SYRINGE 5 ML: at 10:54

## 2019-06-14 RX ADMIN — FILGRASTIM 480 MCG: 480 INJECTION, SOLUTION INTRAVENOUS; SUBCUTANEOUS at 13:17

## 2019-06-14 ASSESSMENT — PAIN SCALES - GENERAL: PAINLEVEL: NO PAIN (0)

## 2019-06-14 NOTE — NURSING NOTE
"Oncology Rooming Note    June 14, 2019 11:11 AM   Angel Yanez is a 59 year old male who presents for:    Chief Complaint   Patient presents with     Blood Draw     Labs drawn via CVC by RN in lab. VS taken. Patient checked in for next appt.     RECHECK     RTN- MM     Initial Vitals: /72 (BP Location: Right arm, Patient Position: Sitting, Cuff Size: Adult Regular)   Pulse 123   Temp 98.7  F (37.1  C) (Oral)   Resp 16   Wt 78.7 kg (173 lb 6.4 oz)   SpO2 95%   BMI 25.59 kg/m   Estimated body mass index is 25.59 kg/m  as calculated from the following:    Height as of 6/5/19: 1.753 m (5' 9.02\").    Weight as of this encounter: 78.7 kg (173 lb 6.4 oz). Body surface area is 1.96 meters squared.  No Pain (0) Comment: Data Unavailable   No LMP for male patient.  Allergies reviewed: Yes  Medications reviewed: Yes    Medications: Medication refills not needed today.  Pharmacy name entered into EPIC:    [facility] - [place], [place] - [address]  [facility] - [place], [place] - [address] [facility]    Clinical concerns: None       Kathy Perez CMA              "

## 2019-06-14 NOTE — PROGRESS NOTES
/72 (BP Location: Right arm, Patient Position: Sitting, Cuff Size: Adult Regular)   Pulse 123   Temp 98.7  F (37.1  C) (Oral)   Resp 16   Wt 78.7 kg (173 lb 6.4 oz)   SpO2 95%   BMI 25.59 kg/m      Wt Readings from Last 4 Encounters:   06/14/19 78.7 kg (173 lb 6.4 oz)   06/13/19 78.9 kg (174 lb)   06/12/19 79.1 kg (174 lb 6.4 oz)   06/11/19 79.7 kg (175 lb 11.3 oz)     Guille was seen and examined by me today.  He is a 59-year-old gentleman who is now 28 days after his second autologous transplant for an IgG kappa multiple myeloma.  Guille is still been coming daily to clinic.  The biggest issue is that he had a very small bone marrow harvest dose of 0.64 million CD34 positive cells.  He was known to be a prior chemo mobilization failure.  Symptomatically Guille is doing fairly well.  He denies any fevers chills or sweats.  He has no shortness of breath but has had an annoying chronic cough that is ameliorated with cough drops and this was investigated in the hospital with CT scan.  The symptoms have been stable and they are really not worse.  He has no GI or  symptoms.  He has no headache visual disturbance or other neurologic symptoms.  His energy level is fair.  He is in good spirits today.  The rest of a 10 point review of systems is unremarkable.    Physical exam: Overall Guille looks well and is in no distress today.  He is afebrile and his vital signs are stable.  Weight today is 78.7 kg.  HEENT exam is unremarkable.  There are no oral lesions.  Pupils are equal round reactive to light.  There is no cervical, axillary or inguinal adenopathy.  Lungs are clear to auscultation percussion.  Cardiac exam is without murmurs.  Abdominal exam is benign and there is no organomegaly.  Lower extremities are without edema.  There are no skin rashes.    Laboratory evaluation: Today electrolytes are normal with a BUN of 10 and a creatinine of 0.73.  Magnesium level is normal.  His CBC shows a hemoglobin of 8.4,  platelet count of 14,000 and a white count of 2100.  His absolute neutrophil count is 1100 today.  He continues to be on daily growth factor.  We also reviewed results of his bone marrow biopsy done earlier this week.  His bone marrow shows 5% cellularity with rare to absent megakaryocytes.  Given his low blood counts and the need for continued platelet transfusions, this is not completely unexpected.  The good news is that there is currently no morphologic or flow cytometry evidence of multiple myeloma.    Is my overall impression that Guille is clinically stable.  Because he has had some minor bleeding in the past, his platelet parameter is less than 20,000 and we will give him platelets today.  We will also give him growth factor.  I have scheduled him to be seen in the clinic on Saturday, Sunday and Monday.  We will check basic labs and blood counts at that time.  He is standard transfusion orders written.  We discussed expectations for the next several weeks.  He understands that with his low stem cell inoculum that transfusion needs may persist for quite some time.  All of his questions have been answered today.    Dr. Angel Mullins MD

## 2019-06-14 NOTE — PROGRESS NOTES
Infusion Nursing Note:  Angel Yanez presents today for platelets.    Patient seen by provider today: Yes: МАРИНА   present during visit today: Not Applicable.    Note: Patient received platelets with no premeds.  Patient also received GCSF subcutaneous x 1 in the left arm.  Patient offers no complaints of pain, still has nagging cough, not new.    Intravenous Access:  Hsieh.    Treatment Conditions:  Lab Results   Component Value Date    HGB 8.4 06/14/2019     Lab Results   Component Value Date    WBC 2.1 06/14/2019      Lab Results   Component Value Date    ANEU 1.1 06/14/2019     Lab Results   Component Value Date    PLT 14 06/14/2019      Lab Results   Component Value Date     06/14/2019                   Lab Results   Component Value Date    POTASSIUM 3.6 06/14/2019           Lab Results   Component Value Date    MAG 2.0 06/14/2019            Lab Results   Component Value Date    CR 0.73 06/14/2019                   Lab Results   Component Value Date    ZHEN 9.0 06/14/2019                Lab Results   Component Value Date    BILITOTAL 0.4 06/12/2019           Lab Results   Component Value Date    ALBUMIN 3.4 06/12/2019                    Lab Results   Component Value Date    ALT 17 06/12/2019           Lab Results   Component Value Date    AST 15 06/12/2019       Results reviewed, labs MET treatment parameters, ok to proceed with treatment.      Post Infusion Assessment:  Patient tolerated infusion without incident.  Patient tolerated injection without incident.  Site patent and intact, free from redness, edema or discomfort.       Discharge Plan:   Discharge instructions reviewed with: Patient.  Patient and/or family verbalized understanding of discharge instructions and all questions answered.  Patient discharged in stable condition accompanied by: self.  Departure Mode: Ambulatory.    Marry Astorga RN

## 2019-06-14 NOTE — NURSING NOTE
Chief Complaint   Patient presents with     Blood Draw     Labs drawn via CVC by RN in lab. VS taken. Patient checked in for next appt.     Labs collected from CVC by RN, line flushed with saline and heparin.  Vitals taken. Pt checked in for appointments.    Ling Rios RN

## 2019-06-15 ENCOUNTER — INFUSION THERAPY VISIT (OUTPATIENT)
Dept: TRANSPLANT | Facility: CLINIC | Age: 59
End: 2019-06-15
Attending: INTERNAL MEDICINE
Payer: COMMERCIAL

## 2019-06-15 VITALS
WEIGHT: 175.5 LBS | BODY MASS INDEX: 25.9 KG/M2 | OXYGEN SATURATION: 97 % | HEART RATE: 112 BPM | RESPIRATION RATE: 16 BRPM | SYSTOLIC BLOOD PRESSURE: 115 MMHG | DIASTOLIC BLOOD PRESSURE: 75 MMHG | TEMPERATURE: 97.5 F

## 2019-06-15 DIAGNOSIS — C90.01 MULTIPLE MYELOMA IN REMISSION (H): Primary | ICD-10-CM

## 2019-06-15 DIAGNOSIS — C90.01 MULTIPLE MYELOMA IN REMISSION (H): ICD-10-CM

## 2019-06-15 LAB
ANION GAP SERPL CALCULATED.3IONS-SCNC: 6 MMOL/L (ref 3–14)
BASOPHILS # BLD AUTO: 0 10E9/L (ref 0–0.2)
BASOPHILS NFR BLD AUTO: 0.9 %
BLD PROD TYP BPU: NORMAL
BLD PROD TYP BPU: NORMAL
BLD UNIT ID BPU: 0
BLOOD PRODUCT CODE: NORMAL
BPU ID: NORMAL
BUN SERPL-MCNC: 9 MG/DL (ref 7–30)
CALCIUM SERPL-MCNC: 8.6 MG/DL (ref 8.5–10.1)
CHLORIDE SERPL-SCNC: 108 MMOL/L (ref 94–109)
CO2 SERPL-SCNC: 26 MMOL/L (ref 20–32)
CREAT SERPL-MCNC: 0.81 MG/DL (ref 0.66–1.25)
DIFFERENTIAL METHOD BLD: ABNORMAL
EOSINOPHIL # BLD AUTO: 0 10E9/L (ref 0–0.7)
EOSINOPHIL NFR BLD AUTO: 2.6 %
ERYTHROCYTE [DISTWIDTH] IN BLOOD BY AUTOMATED COUNT: 12.8 % (ref 10–15)
GFR SERPL CREATININE-BSD FRML MDRD: >90 ML/MIN/{1.73_M2}
GLUCOSE SERPL-MCNC: 115 MG/DL (ref 70–99)
HCT VFR BLD AUTO: 23.5 % (ref 40–53)
HGB BLD-MCNC: 8 G/DL (ref 13.3–17.7)
LYMPHOCYTES # BLD AUTO: 0.6 10E9/L (ref 0.8–5.3)
LYMPHOCYTES NFR BLD AUTO: 33 %
Lab: NORMAL
Lab: NORMAL
MAGNESIUM SERPL-MCNC: 1.8 MG/DL (ref 1.6–2.3)
MCH RBC QN AUTO: 30.7 PG (ref 26.5–33)
MCHC RBC AUTO-ENTMCNC: 34 G/DL (ref 31.5–36.5)
MCV RBC AUTO: 90 FL (ref 78–100)
METAMYELOCYTES # BLD: 0 10E9/L
METAMYELOCYTES NFR BLD MANUAL: 0.9 %
MONOCYTES # BLD AUTO: 0.2 10E9/L (ref 0–1.3)
MONOCYTES NFR BLD AUTO: 8.7 %
NEUTROPHILS # BLD AUTO: 1 10E9/L (ref 1.6–8.3)
NEUTROPHILS NFR BLD AUTO: 53.9 %
NRBC # BLD AUTO: 0 10*3/UL
NRBC BLD AUTO-RTO: 1 /100
NUM BPU REQUESTED: 1
PLATELET # BLD AUTO: 36 10E9/L (ref 150–450)
PLATELET # BLD EST: ABNORMAL 10*3/UL
POTASSIUM SERPL-SCNC: 3.5 MMOL/L (ref 3.4–5.3)
RBC # BLD AUTO: 2.61 10E12/L (ref 4.4–5.9)
RBC MORPH BLD: NORMAL
SODIUM SERPL-SCNC: 139 MMOL/L (ref 133–144)
SPECIMEN SOURCE: NORMAL
SPECIMEN SOURCE: NORMAL
TRANSFUSION STATUS PATIENT QL: NORMAL
TRANSFUSION STATUS PATIENT QL: NORMAL
WBC # BLD AUTO: 1.8 10E9/L (ref 4–11)
YEAST SPEC QL CULT: NO GROWTH
YEAST SPEC QL CULT: NO GROWTH

## 2019-06-15 PROCEDURE — G0463 HOSPITAL OUTPT CLINIC VISIT: HCPCS | Mod: 25

## 2019-06-15 PROCEDURE — 80048 BASIC METABOLIC PNL TOTAL CA: CPT | Performed by: INTERNAL MEDICINE

## 2019-06-15 PROCEDURE — 25000128 H RX IP 250 OP 636: Mod: ZF | Performed by: PHYSICIAN ASSISTANT

## 2019-06-15 PROCEDURE — 25000128 H RX IP 250 OP 636: Mod: ZF | Performed by: INTERNAL MEDICINE

## 2019-06-15 PROCEDURE — 83735 ASSAY OF MAGNESIUM: CPT | Performed by: INTERNAL MEDICINE

## 2019-06-15 PROCEDURE — 85025 COMPLETE CBC W/AUTO DIFF WBC: CPT | Performed by: INTERNAL MEDICINE

## 2019-06-15 PROCEDURE — 96372 THER/PROPH/DIAG INJ SC/IM: CPT

## 2019-06-15 RX ORDER — DIPHENHYDRAMINE HCL 25 MG
25 CAPSULE ORAL EVERY 6 HOURS PRN
Status: CANCELLED
Start: 2019-06-15

## 2019-06-15 RX ORDER — HEPARIN SODIUM,PORCINE 10 UNIT/ML
5 VIAL (ML) INTRAVENOUS
Status: CANCELLED | OUTPATIENT
Start: 2019-06-16

## 2019-06-15 RX ORDER — ACETAMINOPHEN 325 MG/1
650 TABLET ORAL EVERY 4 HOURS PRN
Status: CANCELLED
Start: 2019-06-15

## 2019-06-15 RX ORDER — DIPHENHYDRAMINE HCL 25 MG
25 CAPSULE ORAL EVERY 6 HOURS PRN
Status: DISCONTINUED | OUTPATIENT
Start: 2019-06-15 | End: 2019-06-15 | Stop reason: HOSPADM

## 2019-06-15 RX ORDER — ACETAMINOPHEN 325 MG/1
650 TABLET ORAL EVERY 4 HOURS PRN
Status: DISCONTINUED | OUTPATIENT
Start: 2019-06-15 | End: 2019-06-15 | Stop reason: HOSPADM

## 2019-06-15 RX ORDER — HEPARIN SODIUM,PORCINE 10 UNIT/ML
5 VIAL (ML) INTRAVENOUS ONCE
Status: COMPLETED | OUTPATIENT
Start: 2019-06-15 | End: 2019-06-15

## 2019-06-15 RX ADMIN — FILGRASTIM 480 MCG: 480 INJECTION, SOLUTION INTRAVENOUS; SUBCUTANEOUS at 09:34

## 2019-06-15 RX ADMIN — HEPARIN, PORCINE (PF) 10 UNIT/ML INTRAVENOUS SYRINGE 5 ML: at 09:05

## 2019-06-15 ASSESSMENT — PAIN SCALES - GENERAL: PAINLEVEL: NO PAIN (0)

## 2019-06-15 NOTE — NURSING NOTE
"Oncology Rooming Note    Vonnie 15, 2019 10:19 AM   Angel Yanez is a 59 year old male who presents for:    Chief Complaint   Patient presents with     Blood Draw     Labs drawn via CVC by Rn in lab. VS taken. Pt checked in for next appt     RECHECK     provider appt, labs, med review, Multiple Myeloma     Initial Vitals: /75 (BP Location: Right arm, Patient Position: Sitting, Cuff Size: Adult Regular)   Pulse 112   Temp 97.5  F (36.4  C) (Tympanic)   Resp 16   Wt 79.6 kg (175 lb 8 oz)   SpO2 97%   BMI 25.90 kg/m   Estimated body mass index is 25.9 kg/m  as calculated from the following:    Height as of 6/5/19: 1.753 m (5' 9.02\").    Weight as of this encounter: 79.6 kg (175 lb 8 oz). Body surface area is 1.97 meters squared.  No Pain (0) Comment: Data Unavailable   No LMP for male patient.  Allergies reviewed: Yes  Medications reviewed: Yes    Medications: Medication refills not needed today.  Pharmacy name entered into EPIC:    [facility] - [place], [place] - [address]  [facility] - [place], [place] - [address]    Clinical concerns: none     CARLO CASTRO RN              "

## 2019-06-15 NOTE — NURSING NOTE
Chief Complaint   Patient presents with     Blood Draw     Labs drawn via CVC by Rn in lab. VS taken. Pt checked in for next appt     Labs collected from CVC by RN, line flushed with saline and heparin.  Vitals taken. Pt checked in for appointment(s).    Leslie MAXWELL RN PHN BSN  BMT/Oncology Lab

## 2019-06-15 NOTE — PROGRESS NOTES
/75 (BP Location: Right arm, Patient Position: Sitting, Cuff Size: Adult Regular)   Pulse 112   Temp 97.5  F (36.4  C) (Tympanic)   Resp 16   Wt 79.6 kg (175 lb 8 oz)   SpO2 97%   BMI 25.90 kg/m       Wt Readings from Last 4 Encounters:   06/15/19 79.6 kg (175 lb 8 oz)   06/14/19 78.7 kg (173 lb 6.4 oz)   06/13/19 78.9 kg (174 lb)   06/12/19 79.1 kg (174 lb 6.4 oz)     Guille was seen and examined by me today.  He is a 59-year-old gentleman who is now 29 days after his second autologous transplant for an IGG kappa multiple myeloma.  The biggest issue with Guille is a small bone marrow harvest dose and he has been slow to recover.  Since I last saw him yesterday he continues to do well.  He still has an annoying low level itchy cough and this has not worsened.  He denies any fevers chills or sweats.  He has no nausea vomiting or diarrhea.  He denies any shortness of breath.  He has still somewhat low energy but is doing well.  He is able to take his medications and stay hydrated.    Physical exam: Overall Willi looks well and is in no distress today.  He is afebrile and his vital signs are normal with a stable weight of 79.6 kg.  HEENT exam is unremarkable.  There are no oral lesions.  Pupils are equal round reactive to light.  There is no cervical, axillary or inguinal adenopathy.  Lungs are clear to auscultation percussion.  Cardiac exam is without gallops or murmurs.  Abdominal exam is benign and there is no organomegaly.  Lower extremities are without edema.  There are no skin rashes.    Laboratory evaluation: CBC shows a hemoglobin of 8, lately count of 36,000 after transfusions yesterday and a white count of 1800 with 1000 absolute neutrophils.  Electrolytes are normal with a BUN of 9 and a creatinine of 0.81.    Is my overall impression that Guille is clinically stable.  He is good neutrophil engraftment but continues to be red cell and platelet transfusion dependent.  He received a dose of growth  factor in clinic today.  We have pre-ordered red cells for his hemoglobin of 8 and he will receive these tomorrow.  He is not having any symptoms and there is no clinical reason to give these today.  I expect that he will need platelets either tomorrow or Monday.  He is already scheduled for both of these days.  All of his questions have been answered today.    Dr. Angel Mullins MD

## 2019-06-15 NOTE — NURSING NOTE
[facility] (NEUPOGEN[facility] injection 480 mcg. Order written by Dr. Lucio was completed today. Name and  were verified by patient. See MAR for medication details. Medication was given in the following site: right arm. Patient tolerated injection well and was discharged to home.  Tere Gonzalez CMA

## 2019-06-15 NOTE — PROGRESS NOTES
Infusion Nursing Note:  Angel Yanez presents today for possible transfusions.    Patient seen by provider today: Yes: Dr. Mullins   present during visit today: Not Applicable.    Note: Patient's Hgb was 8.0 today.  He will return tomorrow for PRBC as blood was not available on site today.      Intravenous Access:  Hsieh.    Treatment Conditions:  Lab Results   Component Value Date    HGB 8.0 06/15/2019     Lab Results   Component Value Date    WBC 1.8 06/15/2019      Lab Results   Component Value Date    ANEU 1.0 06/15/2019     Lab Results   Component Value Date    PLT 36 06/15/2019          Post Infusion Assessment:  N/A.       Discharge Plan:   [facility]  Patient will return tomorrow for next appointment.   Patient discharged in stable condition accompanied by: self.  Departure Mode: Ambulatory.    CARLO CASTRO RN

## 2019-06-16 ENCOUNTER — ONCOLOGY VISIT (OUTPATIENT)
Dept: TRANSPLANT | Facility: CLINIC | Age: 59
End: 2019-06-16
Attending: INTERNAL MEDICINE
Payer: COMMERCIAL

## 2019-06-16 ENCOUNTER — APPOINTMENT (OUTPATIENT)
Dept: LAB | Facility: CLINIC | Age: 59
End: 2019-06-16
Attending: INTERNAL MEDICINE
Payer: COMMERCIAL

## 2019-06-16 VITALS
SYSTOLIC BLOOD PRESSURE: 112 MMHG | DIASTOLIC BLOOD PRESSURE: 69 MMHG | TEMPERATURE: 98.6 F | BODY MASS INDEX: 25.77 KG/M2 | HEART RATE: 122 BPM | OXYGEN SATURATION: 97 % | RESPIRATION RATE: 18 BRPM | WEIGHT: 174.6 LBS

## 2019-06-16 DIAGNOSIS — Z94.81 S/P BONE MARROW TRANSPLANT (H): Primary | ICD-10-CM

## 2019-06-16 DIAGNOSIS — C90.01 MULTIPLE MYELOMA IN REMISSION (H): Primary | ICD-10-CM

## 2019-06-16 LAB
ANION GAP SERPL CALCULATED.3IONS-SCNC: 7 MMOL/L (ref 3–14)
BASOPHILS # BLD AUTO: 0 10E9/L (ref 0–0.2)
BASOPHILS NFR BLD AUTO: 0 %
BLD PROD TYP BPU: NORMAL
BLD UNIT ID BPU: 0
BLOOD PRODUCT CODE: NORMAL
BPU ID: NORMAL
BUN SERPL-MCNC: 13 MG/DL (ref 7–30)
CALCIUM SERPL-MCNC: 8.6 MG/DL (ref 8.5–10.1)
CHLORIDE SERPL-SCNC: 110 MMOL/L (ref 94–109)
CO2 SERPL-SCNC: 24 MMOL/L (ref 20–32)
CREAT SERPL-MCNC: 0.84 MG/DL (ref 0.66–1.25)
DIFFERENTIAL METHOD BLD: ABNORMAL
EOSINOPHIL # BLD AUTO: 0 10E9/L (ref 0–0.7)
EOSINOPHIL NFR BLD AUTO: 0 %
ERYTHROCYTE [DISTWIDTH] IN BLOOD BY AUTOMATED COUNT: 13 % (ref 10–15)
GFR SERPL CREATININE-BSD FRML MDRD: >90 ML/MIN/{1.73_M2}
GLUCOSE SERPL-MCNC: 122 MG/DL (ref 70–99)
HCT VFR BLD AUTO: 25 % (ref 40–53)
HGB BLD-MCNC: 8.4 G/DL (ref 13.3–17.7)
LYMPHOCYTES # BLD AUTO: 1.1 10E9/L (ref 0.8–5.3)
LYMPHOCYTES NFR BLD AUTO: 40.9 %
Lab: NORMAL
Lab: NORMAL
MAGNESIUM SERPL-MCNC: 1.8 MG/DL (ref 1.6–2.3)
MCH RBC QN AUTO: 30.8 PG (ref 26.5–33)
MCHC RBC AUTO-ENTMCNC: 33.6 G/DL (ref 31.5–36.5)
MCV RBC AUTO: 92 FL (ref 78–100)
MONOCYTES # BLD AUTO: 0.1 10E9/L (ref 0–1.3)
MONOCYTES NFR BLD AUTO: 5.2 %
NEUTROPHILS # BLD AUTO: 1.5 10E9/L (ref 1.6–8.3)
NEUTROPHILS NFR BLD AUTO: 53.9 %
PLATELET # BLD AUTO: 34 10E9/L (ref 150–450)
PLATELET # BLD EST: ABNORMAL 10*3/UL
POTASSIUM SERPL-SCNC: 3.5 MMOL/L (ref 3.4–5.3)
RBC # BLD AUTO: 2.73 10E12/L (ref 4.4–5.9)
RBC MORPH BLD: NORMAL
SODIUM SERPL-SCNC: 141 MMOL/L (ref 133–144)
SPECIMEN SOURCE: NORMAL
SPECIMEN SOURCE: NORMAL
TRANSFUSION STATUS PATIENT QL: NORMAL
TRANSFUSION STATUS PATIENT QL: NORMAL
WBC # BLD AUTO: 2.7 10E9/L (ref 4–11)
YEAST SPEC QL CULT: NO GROWTH
YEAST SPEC QL CULT: NO GROWTH

## 2019-06-16 PROCEDURE — 85025 COMPLETE CBC W/AUTO DIFF WBC: CPT | Performed by: INTERNAL MEDICINE

## 2019-06-16 PROCEDURE — 86900 BLOOD TYPING SEROLOGIC ABO: CPT | Performed by: INTERNAL MEDICINE

## 2019-06-16 PROCEDURE — 96372 THER/PROPH/DIAG INJ SC/IM: CPT

## 2019-06-16 PROCEDURE — 25000128 H RX IP 250 OP 636: Mod: ZF | Performed by: PHYSICIAN ASSISTANT

## 2019-06-16 PROCEDURE — 80048 BASIC METABOLIC PNL TOTAL CA: CPT | Performed by: INTERNAL MEDICINE

## 2019-06-16 PROCEDURE — 86902 BLOOD TYPE ANTIGEN DONOR EA: CPT | Performed by: INTERNAL MEDICINE

## 2019-06-16 PROCEDURE — 86850 RBC ANTIBODY SCREEN: CPT | Performed by: INTERNAL MEDICINE

## 2019-06-16 PROCEDURE — 83735 ASSAY OF MAGNESIUM: CPT | Performed by: INTERNAL MEDICINE

## 2019-06-16 PROCEDURE — G0463 HOSPITAL OUTPT CLINIC VISIT: HCPCS | Mod: ZF

## 2019-06-16 PROCEDURE — 86901 BLOOD TYPING SEROLOGIC RH(D): CPT | Performed by: INTERNAL MEDICINE

## 2019-06-16 RX ORDER — ACETAMINOPHEN 325 MG/1
650 TABLET ORAL EVERY 4 HOURS PRN
Status: CANCELLED
Start: 2019-06-16

## 2019-06-16 RX ORDER — DIPHENHYDRAMINE HCL 25 MG
25 CAPSULE ORAL EVERY 6 HOURS PRN
Status: DISCONTINUED | OUTPATIENT
Start: 2019-06-16 | End: 2019-06-16 | Stop reason: HOSPADM

## 2019-06-16 RX ORDER — ACETAMINOPHEN 325 MG/1
650 TABLET ORAL EVERY 4 HOURS PRN
Status: DISCONTINUED | OUTPATIENT
Start: 2019-06-16 | End: 2019-06-16 | Stop reason: HOSPADM

## 2019-06-16 RX ORDER — HEPARIN SODIUM,PORCINE 10 UNIT/ML
5 VIAL (ML) INTRAVENOUS
Status: CANCELLED | OUTPATIENT
Start: 2019-06-17

## 2019-06-16 RX ORDER — DIPHENHYDRAMINE HCL 25 MG
25 CAPSULE ORAL EVERY 6 HOURS PRN
Status: CANCELLED
Start: 2019-06-16

## 2019-06-16 RX ADMIN — FILGRASTIM 480 MCG: 480 INJECTION, SOLUTION INTRAVENOUS; SUBCUTANEOUS at 08:59

## 2019-06-16 ASSESSMENT — PAIN SCALES - GENERAL: PAINLEVEL: NO PAIN (0)

## 2019-06-16 NOTE — PROGRESS NOTES
BMT Clinic  Note     Patient ID:  Angel Yanez is a 59 year old male, day +30 s/p 2nd auto for IgB kappa MM .      Diagnosis MM Multiple myeloma  HCT Type Autologous    Prep Regimen Cytoxan  Melphalan   Primary BMT MD Dr. Lucio       INTERVAL  HISTORY   Guille he still has the dry cough.  He did have parainfluenza 3 about 2 weeks ago.  Otherwise he has no shortness of breath or fevers.  He is in good spirits and seems to have good energy today.  There is no bleeding.  There is no nausea vomiting or diarrhea.  Good oral intake.      Review of Systems: 10 point ROS negative except as noted above.     PHYSICAL EXAM   Blood pressure 112/69, pulse 122, temperature 98.6  F (37  C), temperature source Oral, resp. rate 18, weight 79.2 kg (174 lb 9.6 oz), SpO2 97 %.    General: NAD, pleasant  Eyes:  sclera anicteric and not injected  Nose/Mouth/Throat: OP moist, no ulcerations   Lungs: CTAB, no increased WOB  Cardiovascular: RRR, no M/R/G   Abdominal/Rectal: +BS, soft, NT, ND   Lymphatics: no edema  Skin: no rash or petechaie  Neuro: non-focal, no gross deficits  Additional Findings: CVC site NT, no drainage.    Labs:  Lab Results   Component Value Date    WBC 2.7 (L) 06/16/2019    ANEU 1.5 (L) 06/16/2019    HGB 8.4 (L) 06/16/2019    HCT 25.0 (L) 06/16/2019    PLT 34 (LL) 06/16/2019     06/16/2019    POTASSIUM 3.5 06/16/2019    CHLORIDE 110 (H) 06/16/2019    CO2 24 06/16/2019     (H) 06/16/2019    BUN 13 06/16/2019    CR 0.84 06/16/2019    MAG 1.8 06/16/2019    INR 1.10 06/03/2019    BILITOTAL 0.4 06/12/2019    AST 15 06/12/2019    ALT 17 06/12/2019    ALKPHOS 79 06/12/2019    PROTTOTAL 6.7 (L) 06/12/2019    ALBUMIN 3.4 06/12/2019          ASSESSMENT/PLAN   Angel Yanez is a 57 yo man D+30 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  BMT/MM: He is now the 30 in the count seems to be a little more stable.  Slow but steady engraftment; cell dose was only 0.639x10^6. ([facility] [place] - known poor cell dose as  failed chemo-mobilization 1/2019.)   - Continue GCSF until ANC>2500 x2 consecutive days.      2.  HEME: Keep Hgb>8 and plts>20K (hemoptysis). Tylenol/Benadryl premeds.  We will give him G-CSF today.  He does not need platelets or red cells.  We will preorder them for tomorrow.  If his counts are more stable we may consider a day off this week.  - Pt has RBC antibodies. Antibody work up completed 5/26.      3.  ID: Afebrile.  He still has pancytopenia secondary to the poor graft size.  Therefore, we will not start him on Bactrim, but I want him to get pentamadine tomorrow 6/17/2019.  -  +parainfluenza 3. Ongoing cough, no more hemoptysis.  Cough drops, tessalon pearls.   - prophy with HD ACV, Fluconazole, azithro   - CMV neg 5/29     4.  GI:  No n/v/d.  - GI prophy- omeprazole.      5.  FEN/Renal: Creat, lytes wnl. Eating well.      6.  Mood:  Cont Paxil       RTC BMT DOM/NP/PA on 6/17 with labs   Do not staty bactrim  Give pentamidine on 6/17/19  Pre-order RBCs 1u and PLT 1u for 6/17  Given GCSF 6/16, consider 6/17  Consider day off on 6/18

## 2019-06-16 NOTE — PATIENT INSTRUCTIONS
RTC BMT DOM/NP/PA on 6/17 with labs   Do not staty bactrim  Give pentamidine on 6/17/19  Pre-order RBCs 1u and PLT 1u for 6/17  Given GCSF 6/16, consider 6/17  Consider day off on 6/18

## 2019-06-16 NOTE — PROGRESS NOTES
Infusion Nursing Note:  Angel Yanez presents today for poss transfusions.    Patient seen by provider today: Yes: Dr. Erickson   present during visit today: Not Applicable.    Note: Patient did not receive PRBC as expected because his Hgb increased to 8.4.    He was given GCSF subcutaneous x1 per provider.      Intravenous Access:  Hsieh.    Treatment Conditions:  Lab Results   Component Value Date    HGB 8.4 06/16/2019     Lab Results   Component Value Date    WBC 2.7 06/16/2019      Lab Results   Component Value Date    ANEU 1.5 06/16/2019     Lab Results   Component Value Date    PLT 34 06/16/2019      Lab Results   Component Value Date     06/16/2019                   Lab Results   Component Value Date    POTASSIUM 3.5 06/16/2019           Lab Results   Component Value Date    MAG 1.8 06/16/2019            Lab Results   Component Value Date    CR 0.84 06/16/2019                   Lab Results   Component Value Date    ZHEN 8.6 06/16/2019                Lab Results   Component Value Date    BILITOTAL 0.4 06/12/2019           Lab Results   Component Value Date    ALBUMIN 3.4 06/12/2019                    Lab Results   Component Value Date    ALT 17 06/12/2019           Lab Results   Component Value Date    AST 15 06/12/2019           Post Infusion Assessment:  Patient tolerated injection without incident.       Discharge Plan:   Copy of AVS reviewed with patient and/or family.  Patient will return tomorrow for next appointment.  Patient discharged in stable condition accompanied by: self and wife.  Departure Mode: Ambulatory.    CARLO CASTRO RN

## 2019-06-16 NOTE — NURSING NOTE
"Oncology Rooming Note    June 16, 2019 10:35 AM   Angel Yanez is a 59 year old male who presents for:    Chief Complaint   Patient presents with     RECHECK     provider appt, labs, med review, MM     Initial Vitals: /69   Pulse 122   Temp 98.6  F (37  C) (Oral)   Resp 18   Wt 79.2 kg (174 lb 9.6 oz)   SpO2 97%   BMI 25.77 kg/m   Estimated body mass index is 25.77 kg/m  as calculated from the following:    Height as of 6/5/19: 1.753 m (5' 9.02\").    Weight as of this encounter: 79.2 kg (174 lb 9.6 oz). Body surface area is 1.96 meters squared.  No Pain (0) Comment: Data Unavailable   No LMP for male patient.  Allergies reviewed: No  Medications reviewed: No    Medications: Medication refills not needed today.  Pharmacy name entered into EPIC:    [facility] - [place], [place] - [address] [facility] - [place], [place] - [address]    Clinical concerns: none   CARLO CASTRO RN              "

## 2019-06-17 ENCOUNTER — APPOINTMENT (OUTPATIENT)
Dept: LAB | Facility: CLINIC | Age: 59
End: 2019-06-17
Attending: PHYSICIAN ASSISTANT
Payer: COMMERCIAL

## 2019-06-17 ENCOUNTER — ONCOLOGY VISIT (OUTPATIENT)
Dept: TRANSPLANT | Facility: CLINIC | Age: 59
End: 2019-06-17
Attending: PHYSICIAN ASSISTANT
Payer: COMMERCIAL

## 2019-06-17 VITALS
WEIGHT: 173.4 LBS | DIASTOLIC BLOOD PRESSURE: 78 MMHG | SYSTOLIC BLOOD PRESSURE: 123 MMHG | TEMPERATURE: 99.6 F | RESPIRATION RATE: 16 BRPM | OXYGEN SATURATION: 99 % | HEART RATE: 108 BPM | BODY MASS INDEX: 25.59 KG/M2

## 2019-06-17 VITALS
SYSTOLIC BLOOD PRESSURE: 115 MMHG | OXYGEN SATURATION: 99 % | HEART RATE: 109 BPM | TEMPERATURE: 97.6 F | DIASTOLIC BLOOD PRESSURE: 78 MMHG | RESPIRATION RATE: 16 BRPM

## 2019-06-17 DIAGNOSIS — C90.00 MULTIPLE MYELOMA NOT HAVING ACHIEVED REMISSION (H): Primary | ICD-10-CM

## 2019-06-17 DIAGNOSIS — Z94.81 S/P BONE MARROW TRANSPLANT (H): Primary | ICD-10-CM

## 2019-06-17 DIAGNOSIS — C90.01 MULTIPLE MYELOMA IN REMISSION (H): ICD-10-CM

## 2019-06-17 LAB
ALBUMIN SERPL-MCNC: 3.4 G/DL (ref 3.4–5)
ALP SERPL-CCNC: 86 U/L (ref 40–150)
ALT SERPL W P-5'-P-CCNC: 16 U/L (ref 0–70)
ANION GAP SERPL CALCULATED.3IONS-SCNC: 5 MMOL/L (ref 3–14)
AST SERPL W P-5'-P-CCNC: 19 U/L (ref 0–45)
BASOPHILS # BLD AUTO: 0 10E9/L (ref 0–0.2)
BASOPHILS NFR BLD AUTO: 0 %
BILIRUB SERPL-MCNC: 0.4 MG/DL (ref 0.2–1.3)
BLD PROD TYP BPU: NORMAL
BLD UNIT ID BPU: 0
BLOOD PRODUCT CODE: NORMAL
BPU ID: NORMAL
BUN SERPL-MCNC: 12 MG/DL (ref 7–30)
CALCIUM SERPL-MCNC: 8.7 MG/DL (ref 8.5–10.1)
CHLORIDE SERPL-SCNC: 109 MMOL/L (ref 94–109)
CO2 SERPL-SCNC: 26 MMOL/L (ref 20–32)
CREAT SERPL-MCNC: 0.79 MG/DL (ref 0.66–1.25)
DIFFERENTIAL METHOD BLD: ABNORMAL
EOSINOPHIL # BLD AUTO: 0 10E9/L (ref 0–0.7)
EOSINOPHIL NFR BLD AUTO: 0 %
ERYTHROCYTE [DISTWIDTH] IN BLOOD BY AUTOMATED COUNT: 12.9 % (ref 10–15)
GFR SERPL CREATININE-BSD FRML MDRD: >90 ML/MIN/{1.73_M2}
GLUCOSE SERPL-MCNC: 106 MG/DL (ref 70–99)
HCT VFR BLD AUTO: 23.4 % (ref 40–53)
HGB BLD-MCNC: 7.9 G/DL (ref 13.3–17.7)
LYMPHOCYTES # BLD AUTO: 1.1 10E9/L (ref 0.8–5.3)
LYMPHOCYTES NFR BLD AUTO: 42.6 %
Lab: NORMAL
Lab: NORMAL
MCH RBC QN AUTO: 30.5 PG (ref 26.5–33)
MCHC RBC AUTO-ENTMCNC: 33.8 G/DL (ref 31.5–36.5)
MCV RBC AUTO: 90 FL (ref 78–100)
MONOCYTES # BLD AUTO: 0.2 10E9/L (ref 0–1.3)
MONOCYTES NFR BLD AUTO: 8.7 %
NEUTROPHILS # BLD AUTO: 1.2 10E9/L (ref 1.6–8.3)
NEUTROPHILS NFR BLD AUTO: 48.7 %
NUM BPU REQUESTED: 1
PLATELET # BLD AUTO: 21 10E9/L (ref 150–450)
PLATELET # BLD EST: ABNORMAL 10*3/UL
POTASSIUM SERPL-SCNC: 3.7 MMOL/L (ref 3.4–5.3)
PROT SERPL-MCNC: 6.6 G/DL (ref 6.8–8.8)
RBC # BLD AUTO: 2.59 10E12/L (ref 4.4–5.9)
RBC MORPH BLD: NORMAL
SODIUM SERPL-SCNC: 139 MMOL/L (ref 133–144)
SPECIMEN SOURCE: NORMAL
SPECIMEN SOURCE: NORMAL
TRANSFUSION STATUS PATIENT QL: NORMAL
WBC # BLD AUTO: 2.5 10E9/L (ref 4–11)
YEAST SPEC QL CULT: NO GROWTH
YEAST SPEC QL CULT: NO GROWTH

## 2019-06-17 PROCEDURE — 25000128 H RX IP 250 OP 636: Mod: ZF | Performed by: PHYSICIAN ASSISTANT

## 2019-06-17 PROCEDURE — 86880 COOMBS TEST DIRECT: CPT | Performed by: INTERNAL MEDICINE

## 2019-06-17 PROCEDURE — 94642 AEROSOL INHALATION TREATMENT: CPT

## 2019-06-17 PROCEDURE — 25000132 ZZH RX MED GY IP 250 OP 250 PS 637: Mod: ZF | Performed by: PHYSICIAN ASSISTANT

## 2019-06-17 PROCEDURE — 86970 RBC PRETX INCUBATJ W/CHEMICL: CPT | Performed by: INTERNAL MEDICINE

## 2019-06-17 PROCEDURE — 80053 COMPREHEN METABOLIC PANEL: CPT | Performed by: INTERNAL MEDICINE

## 2019-06-17 PROCEDURE — 85025 COMPLETE CBC W/AUTO DIFF WBC: CPT | Performed by: INTERNAL MEDICINE

## 2019-06-17 PROCEDURE — 96372 THER/PROPH/DIAG INJ SC/IM: CPT

## 2019-06-17 PROCEDURE — 90471 IMMUNIZATION ADMIN: CPT

## 2019-06-17 PROCEDURE — 86870 RBC ANTIBODY IDENTIFICATION: CPT | Performed by: INTERNAL MEDICINE

## 2019-06-17 PROCEDURE — G0463 HOSPITAL OUTPT CLINIC VISIT: HCPCS | Mod: 25

## 2019-06-17 PROCEDURE — 25000125 ZZHC RX 250: Mod: ZF | Performed by: NURSE PRACTITIONER

## 2019-06-17 RX ORDER — HEPARIN SODIUM,PORCINE 10 UNIT/ML
5 VIAL (ML) INTRAVENOUS
Status: CANCELLED | OUTPATIENT
Start: 2019-06-18

## 2019-06-17 RX ORDER — DIPHENHYDRAMINE HCL 25 MG
25 CAPSULE ORAL EVERY 6 HOURS PRN
Status: CANCELLED
Start: 2019-06-17

## 2019-06-17 RX ORDER — PENTAMIDINE ISETHIONATE 300 MG/300MG
300 INHALANT RESPIRATORY (INHALATION) ONCE
Status: COMPLETED | OUTPATIENT
Start: 2019-06-17 | End: 2019-06-17

## 2019-06-17 RX ORDER — ACETAMINOPHEN 325 MG/1
650 TABLET ORAL
Status: DISCONTINUED | OUTPATIENT
Start: 2019-06-17 | End: 2019-06-17 | Stop reason: HOSPADM

## 2019-06-17 RX ORDER — HEPARIN SODIUM,PORCINE 10 UNIT/ML
5 VIAL (ML) INTRAVENOUS
Status: DISCONTINUED | OUTPATIENT
Start: 2019-06-17 | End: 2019-06-17 | Stop reason: HOSPADM

## 2019-06-17 RX ORDER — ACETAMINOPHEN 325 MG/1
650 TABLET ORAL ONCE
Status: DISCONTINUED | OUTPATIENT
Start: 2019-06-17 | End: 2019-06-17 | Stop reason: HOSPADM

## 2019-06-17 RX ORDER — ACETAMINOPHEN 325 MG/1
650 TABLET ORAL EVERY 4 HOURS PRN
Status: CANCELLED
Start: 2019-06-17

## 2019-06-17 RX ORDER — HEPARIN SODIUM,PORCINE 10 UNIT/ML
5 VIAL (ML) INTRAVENOUS ONCE
Status: COMPLETED | OUTPATIENT
Start: 2019-06-17 | End: 2019-06-17

## 2019-06-17 RX ORDER — ALBUTEROL SULFATE 0.83 MG/ML
2.5 SOLUTION RESPIRATORY (INHALATION) ONCE
Status: COMPLETED | OUTPATIENT
Start: 2019-06-17 | End: 2019-06-17

## 2019-06-17 RX ORDER — ALBUTEROL SULFATE 0.83 MG/ML
2.5 SOLUTION RESPIRATORY (INHALATION) ONCE
Status: CANCELLED
Start: 2019-06-18

## 2019-06-17 RX ORDER — PENTAMIDINE ISETHIONATE 300 MG/300MG
300 INHALANT RESPIRATORY (INHALATION) ONCE
Status: CANCELLED
Start: 2019-06-18

## 2019-06-17 RX ADMIN — FILGRASTIM 480 MCG: 480 INJECTION, SOLUTION INTRAVENOUS; SUBCUTANEOUS at 09:24

## 2019-06-17 RX ADMIN — HEPARIN, PORCINE (PF) 10 UNIT/ML INTRAVENOUS SYRINGE 5 ML: at 08:25

## 2019-06-17 RX ADMIN — HEPARIN, PORCINE (PF) 10 UNIT/ML INTRAVENOUS SYRINGE 5 ML: at 10:33

## 2019-06-17 RX ADMIN — PENTAMIDINE ISETHIONATE 300 MG: 300 INHALANT RESPIRATORY (INHALATION) at 09:33

## 2019-06-17 RX ADMIN — ACETAMINOPHEN 650 MG: 325 TABLET ORAL at 10:30

## 2019-06-17 RX ADMIN — ALBUTEROL SULFATE 2.5 MG: 2.5 SOLUTION RESPIRATORY (INHALATION) at 09:33

## 2019-06-17 ASSESSMENT — PAIN SCALES - GENERAL: PAINLEVEL: NO PAIN (0)

## 2019-06-17 NOTE — PROGRESS NOTES
BMT Clinic  Note     Patient ID:  Angel Yanez is a 59 year old male, day +31 s/p 2nd auto for IgB kappa MM .      Diagnosis MM Multiple myeloma  HCT Type Autologous    Prep Regimen Cytoxan  Melphalan   Primary BMT MD Dr. Lucio       INTERVAL  HISTORY   Guille he still has the dry cough from parainfluenza 3 about 2 weeks ago. No other complaints today.     Review of Systems: 10 point ROS negative except as noted above.     PHYSICAL EXAM   Blood pressure 123/78, pulse 108, temperature 99.6  F (37.6  C), temperature source Oral, resp. rate 16, weight 78.7 kg (173 lb 6.4 oz), SpO2 99 %.    General: NAD, pleasant  Eyes:  sclera anicteric and not injected  Nose/Mouth/Throat: OP moist, no ulcerations   Lungs: CTAB, no increased WOB  Cardiovascular: RRR, no M/R/G   Abdominal/Rectal: +BS, soft, NT, ND   Lymphatics: no edema  Skin: no rash or petechaie  Neuro: non-focal, no gross deficits  Additional Findings: CVC site NT, no drainage.    Labs:  Lab Results   Component Value Date    WBC 2.5 (L) 06/17/2019    ANEU 1.2 (L) 06/17/2019    HGB 7.9 (L) 06/17/2019    HCT 23.4 (L) 06/17/2019    PLT 21 (LL) 06/17/2019     06/17/2019    POTASSIUM 3.7 06/17/2019    CHLORIDE 109 06/17/2019    CO2 26 06/17/2019     (H) 06/17/2019    BUN 12 06/17/2019    CR 0.79 06/17/2019    MAG 1.8 06/16/2019    INR 1.10 06/03/2019    BILITOTAL 0.4 06/17/2019    AST 19 06/17/2019    ALT 16 06/17/2019    ALKPHOS 86 06/17/2019    PROTTOTAL 6.6 (L) 06/17/2019    ALBUMIN 3.4 06/17/2019          ASSESSMENT/PLAN   Angel Yanez is a 59 yo man D+31 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  BMT/MM: He is now day 30 in the count seems to be a little more stable.  Slow but steady engraftment; cell dose was only 0.639x10^6. ([facility] [place] - known poor cell dose as failed chemo-mobilization 1/2019.)   - Continue GCSF until ANC>2500 x2 consecutive days.      2.  HEME: Keep Hgb>8 and plts>20K (hemoptysis). Tylenol/Benadryl premeds.  We will give  him G-CSF today.  Antibodies present so unfortunately it will take 6-8 hours to get the bloood so will add on for RBCs and plts tomorrow.  - Pt has RBC antibodies. Antibody work up completed 5/26.      3.  ID: Afebrile.  He still has pancytopenia secondary to the poor graft size.  Hold off on Bactrim and give pentamadine today 6/17/2019.  -  +parainfluenza 3. Ongoing cough, no more hemoptysis.  Cough drops, tessalon pearls.   - prophy with HD ACV, Fluconazole, azithro   - CMV neg 5/29     4.  GI:  No n/v/d.  - GI prophy- omeprazole.      5.  FEN/Renal: Creat, lytes wnl. Eating well.      6.  Mood:  Cont Paxil       RTC BMT DOM/NP/PA    Give pentamidine today  Pre-order RBCs 1u and PLT 1u for 6/18  Given GCSF   Consider day off on 6/19    Mariana Jolly NP

## 2019-06-17 NOTE — NURSING NOTE
Research labs drawn by RN from central line in clinic. Patient also given tylenol at 1030 as a premed to CMV vaccine.     BRITTNY HART RN

## 2019-06-17 NOTE — NURSING NOTE
Gave patient injection of CMV vaccine for research. Please see MAR for time and site. Ashley Gardner, BLAINEA

## 2019-06-17 NOTE — NURSING NOTE
"Oncology Rooming Note    June 17, 2019 8:35 AM   Angel Yanez is a 59 year old male who presents for:    Chief Complaint   Patient presents with     Blood Draw     labs drawn via cvc by rn. vs taken. caps changed     RECHECK     RTN- multiple myeloma     Initial Vitals: /78 (BP Location: Right arm, Patient Position: Sitting, Cuff Size: Adult Regular)   Pulse 108   Temp 99.6  F (37.6  C) (Oral)   Resp 16   Wt 78.7 kg (173 lb 6.4 oz)   SpO2 99%   BMI 25.59 kg/m   Estimated body mass index is 25.59 kg/m  as calculated from the following:    Height as of 6/5/19: 1.753 m (5' 9.02\").    Weight as of this encounter: 78.7 kg (173 lb 6.4 oz). Body surface area is 1.96 meters squared.  No Pain (0) Comment: Data Unavailable   No LMP for male patient.  Allergies reviewed: Yes  Medications reviewed: Yes    Medications: Medication refills not needed today.  Pharmacy name entered into EPIC:    [facility] - [place], [place] - [address]  [facility] - [place], [place] - [address]    Clinical concerns: none    Vickie Harris CMA              "

## 2019-06-17 NOTE — PROGRESS NOTES
We were informed by blood bank that the patient has positive antibodies and needs additional tubes drawn to send to the [facility] for further workup.  Mariana Jolly NP was notified and the patient will return tomorrow for blood and platelets.  Extra tubes drawn by this RN via central line catheter and sent to lab to send to [facility].  Patient aware of and agreeable to plan.

## 2019-06-17 NOTE — NURSING NOTE
Chief Complaint   Patient presents with     Blood Draw     labs drawn via cvc by rn. vs taken. caps changed     CVC accessed and labs drawn by RN in lab. Flushed with heparin. Caps changed. VS taken. Pt checked in for next appointment.   Parris Lua RN

## 2019-06-17 NOTE — NURSING NOTE
Performed 30 minute post dose vital signs on pt in clinic for research. Please see flowsheet for results. Ashley Gardner, CMRA

## 2019-06-17 NOTE — NURSING NOTE
Dressing change completed sterile technique used. Site WDL. Patient tolerated dressing change. See Dock Flowsheet.   Vickie Harris CMA    Administered Neupogen without any complications. Pt tolerated well. Vickie Harris CMA

## 2019-06-17 NOTE — NURSING NOTE
Patient was first given Albuterol 2.5 mg nebulizer, after which Pentamadine 300 mg mixed with 6cc Sterile H2O was administered through a filtered nebulizer.  No adverse side effects noted by the patient.    GENE [place]

## 2019-06-18 ENCOUNTER — ONCOLOGY VISIT (OUTPATIENT)
Dept: TRANSPLANT | Facility: CLINIC | Age: 59
End: 2019-06-18
Attending: NURSE PRACTITIONER
Payer: COMMERCIAL

## 2019-06-18 ENCOUNTER — APPOINTMENT (OUTPATIENT)
Dept: LAB | Facility: CLINIC | Age: 59
End: 2019-06-18
Attending: STUDENT IN AN ORGANIZED HEALTH CARE EDUCATION/TRAINING PROGRAM
Payer: COMMERCIAL

## 2019-06-18 ENCOUNTER — INFUSION THERAPY VISIT (OUTPATIENT)
Dept: TRANSPLANT | Facility: CLINIC | Age: 59
End: 2019-06-18
Attending: STUDENT IN AN ORGANIZED HEALTH CARE EDUCATION/TRAINING PROGRAM
Payer: COMMERCIAL

## 2019-06-18 VITALS
SYSTOLIC BLOOD PRESSURE: 105 MMHG | TEMPERATURE: 97.2 F | BODY MASS INDEX: 26.13 KG/M2 | OXYGEN SATURATION: 100 % | RESPIRATION RATE: 18 BRPM | WEIGHT: 177 LBS | HEART RATE: 124 BPM | DIASTOLIC BLOOD PRESSURE: 61 MMHG

## 2019-06-18 VITALS
HEART RATE: 95 BPM | SYSTOLIC BLOOD PRESSURE: 129 MMHG | RESPIRATION RATE: 18 BRPM | OXYGEN SATURATION: 97 % | DIASTOLIC BLOOD PRESSURE: 83 MMHG | TEMPERATURE: 99.1 F

## 2019-06-18 DIAGNOSIS — C90.00 MULTIPLE MYELOMA NOT HAVING ACHIEVED REMISSION (H): Primary | ICD-10-CM

## 2019-06-18 DIAGNOSIS — C90.01 MULTIPLE MYELOMA IN REMISSION (H): Primary | ICD-10-CM

## 2019-06-18 DIAGNOSIS — Z94.81 S/P BONE MARROW TRANSPLANT (H): ICD-10-CM

## 2019-06-18 LAB
ANION GAP SERPL CALCULATED.3IONS-SCNC: 7 MMOL/L (ref 3–14)
BASOPHILS # BLD AUTO: 0 10E9/L (ref 0–0.2)
BASOPHILS NFR BLD AUTO: 0 %
BLD PROD TYP BPU: NORMAL
BLD PROD TYP BPU: NORMAL
BLD UNIT ID BPU: 0
BLOOD PRODUCT CODE: NORMAL
BPU ID: NORMAL
BUN SERPL-MCNC: 10 MG/DL (ref 7–30)
CALCIUM SERPL-MCNC: 8.4 MG/DL (ref 8.5–10.1)
CHLORIDE SERPL-SCNC: 108 MMOL/L (ref 94–109)
CMV DNA SPEC NAA+PROBE-ACNC: NORMAL [IU]/ML
CMV DNA SPEC NAA+PROBE-LOG#: NORMAL {LOG_IU}/ML
CO2 SERPL-SCNC: 26 MMOL/L (ref 20–32)
CREAT SERPL-MCNC: 0.77 MG/DL (ref 0.66–1.25)
DACRYOCYTES BLD QL SMEAR: SLIGHT
DIFFERENTIAL METHOD BLD: ABNORMAL
EOSINOPHIL # BLD AUTO: 0 10E9/L (ref 0–0.7)
EOSINOPHIL NFR BLD AUTO: 0 %
ERYTHROCYTE [DISTWIDTH] IN BLOOD BY AUTOMATED COUNT: 13 % (ref 10–15)
GFR SERPL CREATININE-BSD FRML MDRD: >90 ML/MIN/{1.73_M2}
GLUCOSE SERPL-MCNC: 117 MG/DL (ref 70–99)
HCT VFR BLD AUTO: 22.1 % (ref 40–53)
HGB BLD-MCNC: 7.3 G/DL (ref 13.3–17.7)
LYMPHOCYTES # BLD AUTO: 0.7 10E9/L (ref 0.8–5.3)
LYMPHOCYTES NFR BLD AUTO: 25.2 %
MAGNESIUM SERPL-MCNC: 1.9 MG/DL (ref 1.6–2.3)
MCH RBC QN AUTO: 30.4 PG (ref 26.5–33)
MCHC RBC AUTO-ENTMCNC: 33 G/DL (ref 31.5–36.5)
MCV RBC AUTO: 92 FL (ref 78–100)
MONOCYTES # BLD AUTO: 0.3 10E9/L (ref 0–1.3)
MONOCYTES NFR BLD AUTO: 9.6 %
NEUTROPHILS # BLD AUTO: 1.9 10E9/L (ref 1.6–8.3)
NEUTROPHILS NFR BLD AUTO: 65.2 %
NUM BPU REQUESTED: 1
OVALOCYTES BLD QL SMEAR: SLIGHT
PLATELET # BLD AUTO: 15 10E9/L (ref 150–450)
PLATELET # BLD EST: ABNORMAL 10*3/UL
POIKILOCYTOSIS BLD QL SMEAR: SLIGHT
POTASSIUM SERPL-SCNC: 3.7 MMOL/L (ref 3.4–5.3)
RBC # BLD AUTO: 2.4 10E12/L (ref 4.4–5.9)
SODIUM SERPL-SCNC: 140 MMOL/L (ref 133–144)
SPECIMEN SOURCE: NORMAL
TRANSFUSION STATUS PATIENT QL: NORMAL
TRANSFUSION STATUS PATIENT QL: NORMAL
WBC # BLD AUTO: 2.9 10E9/L (ref 4–11)

## 2019-06-18 PROCEDURE — 36430 TRANSFUSION BLD/BLD COMPNT: CPT

## 2019-06-18 PROCEDURE — G0463 HOSPITAL OUTPT CLINIC VISIT: HCPCS | Mod: ZF

## 2019-06-18 PROCEDURE — 96372 THER/PROPH/DIAG INJ SC/IM: CPT

## 2019-06-18 PROCEDURE — 80048 BASIC METABOLIC PNL TOTAL CA: CPT | Performed by: NURSE PRACTITIONER

## 2019-06-18 PROCEDURE — 83735 ASSAY OF MAGNESIUM: CPT | Performed by: NURSE PRACTITIONER

## 2019-06-18 PROCEDURE — 25000132 ZZH RX MED GY IP 250 OP 250 PS 637: Mod: ZF | Performed by: INTERNAL MEDICINE

## 2019-06-18 PROCEDURE — 85025 COMPLETE CBC W/AUTO DIFF WBC: CPT | Performed by: NURSE PRACTITIONER

## 2019-06-18 PROCEDURE — P9040 RBC LEUKOREDUCED IRRADIATED: HCPCS | Performed by: INTERNAL MEDICINE

## 2019-06-18 PROCEDURE — 25000128 H RX IP 250 OP 636: Mod: ZF | Performed by: PHYSICIAN ASSISTANT

## 2019-06-18 PROCEDURE — P9037 PLATE PHERES LEUKOREDU IRRAD: HCPCS | Performed by: PHYSICIAN ASSISTANT

## 2019-06-18 RX ORDER — ACETAMINOPHEN 325 MG/1
650 TABLET ORAL EVERY 4 HOURS PRN
Status: DISCONTINUED | OUTPATIENT
Start: 2019-06-18 | End: 2019-06-18 | Stop reason: HOSPADM

## 2019-06-18 RX ORDER — HEPARIN SODIUM,PORCINE 10 UNIT/ML
5 VIAL (ML) INTRAVENOUS
Status: CANCELLED | OUTPATIENT
Start: 2019-06-19

## 2019-06-18 RX ORDER — DIPHENHYDRAMINE HCL 25 MG
25 CAPSULE ORAL EVERY 6 HOURS PRN
Status: CANCELLED
Start: 2019-06-18

## 2019-06-18 RX ORDER — ACETAMINOPHEN 325 MG/1
650 TABLET ORAL EVERY 4 HOURS PRN
Status: CANCELLED
Start: 2019-06-18

## 2019-06-18 RX ORDER — HEPARIN SODIUM,PORCINE 10 UNIT/ML
5 VIAL (ML) INTRAVENOUS
Status: DISCONTINUED | OUTPATIENT
Start: 2019-06-18 | End: 2019-06-18 | Stop reason: HOSPADM

## 2019-06-18 RX ORDER — DIPHENHYDRAMINE HCL 25 MG
25 CAPSULE ORAL EVERY 6 HOURS PRN
Status: DISCONTINUED | OUTPATIENT
Start: 2019-06-18 | End: 2019-06-18 | Stop reason: HOSPADM

## 2019-06-18 RX ADMIN — FILGRASTIM 480 MCG: 480 INJECTION, SOLUTION INTRAVENOUS; SUBCUTANEOUS at 15:20

## 2019-06-18 ASSESSMENT — PAIN SCALES - GENERAL: PAINLEVEL: NO PAIN (0)

## 2019-06-18 NOTE — NURSING NOTE
Chief Complaint   Patient presents with     Blood Draw     Right DL CVC line, labs drawn from red port, + blood noted from purple port, flushed with saline and heparin, vitals completed, checked inot next appointment.   Tana Mendez,RN

## 2019-06-18 NOTE — NURSING NOTE
"Oncology Rooming Note    June 18, 2019 10:48 AM   Angel Yanez is a 59 year old male who presents for:    Chief Complaint   Patient presents with     Blood Draw     Right DL CVC line, labs drawn from red port, + blood noted from purple port, flushed with saline and heparin, vitals completed, checked inot next appointment.     RECHECK     RTN- MM     Initial Vitals: /61 (BP Location: Right arm, Patient Position: Sitting, Cuff Size: Adult Regular)   Pulse 124   Temp 97.2  F (36.2  C) (Oral)   Resp 18   Wt 80.3 kg (177 lb)   SpO2 100%   BMI 26.13 kg/m   Estimated body mass index is 26.13 kg/m  as calculated from the following:    Height as of 6/5/19: 1.753 m (5' 9.02\").    Weight as of this encounter: 80.3 kg (177 lb). Body surface area is 1.98 meters squared.  No Pain (0) Comment: Data Unavailable   No LMP for male patient.  Allergies reviewed: Yes  Medications reviewed: Yes    Medications: Medication refills not needed today.  Pharmacy name entered into EPIC:    [facility] [place] [facility] [address] [address]  [facility] - [place], [place] - [address]    Clinical concerns: None      Kathy Perez CMA              "

## 2019-06-18 NOTE — PROGRESS NOTES
BMT Clinic  Note     Patient ID:  Angel Yanez is a 59 year old male, day +32 s/p 2nd auto for IgB kappa MM .      Diagnosis MM Multiple myeloma  HCT Type Autologous    Prep Regimen Cytoxan  Melphalan   Primary BMT MD Dr. Lucio       INTERVAL  HISTORY   Guille is feeling well outside of persistent cough. No new chest pain or rib pain. Not SOB unless coughing fit goes on and on. No fevers. No hemoptysis. No rashes, bruises or bleeding. Tolerating gcsf. Anticipated blood products today.    Review of Systems: 10 point ROS negative except as noted above.     PHYSICAL EXAM   Blood pressure 105/61, pulse 124, temperature 97.2  F (36.2  C), temperature source Oral, resp. rate 18, weight 80.3 kg (177 lb), SpO2 100 %.    General: NAD, pleasant  Eyes:  sclera anicteric and not injected  Nose/Mouth/Throat: OP moist, no ulcerations   Lungs: CTAB, no increased WOB  Cardiovascular: RRR, no M/R/G   Abdominal/Rectal: +BS, soft, NT, ND   Lymphatics: no edema  Skin: no rash or petechaie  Neuro: non-focal, no gross deficits  Additional Findings: CVC site NT, no drainage.    Labs:  Lab Results   Component Value Date    WBC 2.9 (L) 06/18/2019    ANEU 1.9 06/18/2019    HGB 7.3 (L) 06/18/2019    HCT 22.1 (L) 06/18/2019    PLT 15 (LL) 06/18/2019     06/18/2019    POTASSIUM 3.7 06/18/2019    CHLORIDE 108 06/18/2019    CO2 26 06/18/2019     (H) 06/18/2019    BUN 10 06/18/2019    CR 0.77 06/18/2019    MAG 1.9 06/18/2019    INR 1.10 06/03/2019    BILITOTAL 0.4 06/17/2019    AST 19 06/17/2019    ALT 16 06/17/2019    ALKPHOS 86 06/17/2019    PROTTOTAL 6.6 (L) 06/17/2019    ALBUMIN 3.4 06/17/2019          ASSESSMENT/PLAN   Angel Yanez is a 60 yo man D+32 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  BMT/MM: He is now day 30 in the count seems to be a little more stable.  Slow but steady engraftment; cell dose was only 0.639x10^6. ([facility] [place] - known poor cell dose as failed chemo-mobilization 1/2019.)   - Continue GCSF  until ANC>2500 x2 consecutive days.      2.  HEME: Keep Hgb>8 and plts>20K (hemoptysis). Tylenol/Benadryl premeds.  We will give him G-CSF today as well as the anticipated RBCs/plt that were ordered yesterday  - Pt has RBC antibodies. Antibody work up completed 5/26.      3.  ID: Afebrile.  He still has pancytopenia 2/2 small graft size  - +parainfluenza 3. Ongoing cough, no more hemoptysis.  Cough drops, tessalon pearls.   - prophy with HD ACV, Fluconazole, azithro, bactrim 6/17 for pancytopenias.  - CMV neg 5/29     4.  GI:  No n/v/d.  - GI prophy- omeprazole.      5.  FEN/Renal: Creat, lytes wnl. Eating well.      6.  Mood:  Cont Paxil       RTC tomorrow for gcsf, likely will have day off blood products    Mony Pitts PAC  [phone]

## 2019-06-18 NOTE — PROGRESS NOTES
"BMT Clinic  Note     Patient ID:  Angel Yanez is a 59 year old male, day +33 s/p 2nd auto for IgB kappa MM .      Diagnosis MM Multiple myeloma  HCT Type Autologous    Prep Regimen Cytoxan  Melphalan   Primary BMT MD Dr. Lucio       INTERVAL  HISTORY   Guille is feeling good today.  Has a persistent cough that is only slowly improving.  Eating well with no N/V/D.  Afebrile.  No pain, bleeding or rash.  Review of Systems: 10 point ROS negative except as noted above.     PHYSICAL EXAM   Blood pressure 115/79, pulse 97, temperature 97.7  F (36.5  C), temperature source Tympanic, resp. rate 16, height 1.753 m (5' 9.02\"), weight 80.4 kg (177 lb 4.8 oz), SpO2 99 %.    General: NAD, pleasant  Eyes:  sclera anicteric and not injected  Nose/Mouth/Throat: OP moist, no ulcerations   Lungs: CTAB, no increased WOB  Cardiovascular: RRR, no M/R/G   Abdominal/Rectal: +BS, soft, NT, ND   Lymphatics: no edema  Skin: no rash or petechaie  Neuro: non-focal, no gross deficits  Additional Findings: CVC site NT, no drainage.    Labs:  Lab Results   Component Value Date    WBC 3.3 (L) 06/19/2019    ANEU 2.0 06/19/2019    HGB 9.3 (L) 06/19/2019    HCT 27.2 (L) 06/19/2019    PLT 21 (LL) 06/19/2019     06/19/2019    POTASSIUM 3.9 06/19/2019    CHLORIDE 108 06/19/2019    CO2 27 06/19/2019    GLC 96 06/19/2019    BUN 8 06/19/2019    CR 0.82 06/19/2019    MAG 1.9 06/18/2019    INR 1.10 06/03/2019    BILITOTAL 0.4 06/17/2019    AST 19 06/17/2019    ALT 16 06/17/2019    ALKPHOS 86 06/17/2019    PROTTOTAL 6.6 (L) 06/17/2019    ALBUMIN 3.4 06/17/2019          ASSESSMENT/PLAN   Angel Yanez is a 60 yo man D+33 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  BMT/MM:   Slow but steady engraftment; cell dose was only 0.639x10^6. ([facility] [place] - known poor cell dose as failed chemo-mobilization 1/2019.)   - Continue GCSF until ANC>2500 x2 consecutive days.      2.  HEME: Keep Hgb>8 and plts>20K (hemoptysis). Tylenol/Benadryl premeds.  No " transfusions today  - Pt has RBC antibodies. Antibody work up completed 5/26.      3.  ID: Afebrile.    - +parainfluenza 3. Ongoing cough, no more hemoptysis.  Cough drops, tessalon pearls.   - prophy with HD ACV, Fluconazole, azithro, bactrim 6/17 for pancytopenias.  - CMV neg 6/17.  On CMV vaccine trial     4.  GI:  No n/v/d.  - GI prophy- omeprazole.       5.  FEN/Renal: Creat, lytes wnl. Eating well.      6.  Mood:  Cont Paxil       RTC tomorrow for gcsf    Karla Manzo

## 2019-06-19 ENCOUNTER — INFUSION THERAPY VISIT (OUTPATIENT)
Dept: TRANSPLANT | Facility: CLINIC | Age: 59
End: 2019-06-19
Attending: NURSE PRACTITIONER
Payer: COMMERCIAL

## 2019-06-19 ENCOUNTER — APPOINTMENT (OUTPATIENT)
Dept: LAB | Facility: CLINIC | Age: 59
End: 2019-06-19
Attending: NURSE PRACTITIONER
Payer: COMMERCIAL

## 2019-06-19 VITALS
OXYGEN SATURATION: 99 % | TEMPERATURE: 97.7 F | WEIGHT: 177.3 LBS | SYSTOLIC BLOOD PRESSURE: 115 MMHG | RESPIRATION RATE: 16 BRPM | DIASTOLIC BLOOD PRESSURE: 79 MMHG | BODY MASS INDEX: 26.26 KG/M2 | HEART RATE: 97 BPM | HEIGHT: 69 IN

## 2019-06-19 DIAGNOSIS — C90.00 MULTIPLE MYELOMA NOT HAVING ACHIEVED REMISSION (H): Primary | ICD-10-CM

## 2019-06-19 DIAGNOSIS — C90.01 MULTIPLE MYELOMA IN REMISSION (H): ICD-10-CM

## 2019-06-19 DIAGNOSIS — C90.01 MULTIPLE MYELOMA IN REMISSION (H): Primary | ICD-10-CM

## 2019-06-19 LAB
ANION GAP SERPL CALCULATED.3IONS-SCNC: 6 MMOL/L (ref 3–14)
BASOPHILS # BLD AUTO: 0 10E9/L (ref 0–0.2)
BASOPHILS NFR BLD AUTO: 0 %
BLD PROD TYP BPU: NORMAL
BLD UNIT ID BPU: 0
BLOOD PRODUCT CODE: NORMAL
BPU ID: NORMAL
BUN SERPL-MCNC: 8 MG/DL (ref 7–30)
CALCIUM SERPL-MCNC: 8.3 MG/DL (ref 8.5–10.1)
CHLORIDE SERPL-SCNC: 108 MMOL/L (ref 94–109)
CO2 SERPL-SCNC: 27 MMOL/L (ref 20–32)
CREAT SERPL-MCNC: 0.82 MG/DL (ref 0.66–1.25)
DIFFERENTIAL METHOD BLD: ABNORMAL
EOSINOPHIL # BLD AUTO: 0 10E9/L (ref 0–0.7)
EOSINOPHIL NFR BLD AUTO: 0 %
ERYTHROCYTE [DISTWIDTH] IN BLOOD BY AUTOMATED COUNT: 13.5 % (ref 10–15)
GFR SERPL CREATININE-BSD FRML MDRD: >90 ML/MIN/{1.73_M2}
GLUCOSE SERPL-MCNC: 96 MG/DL (ref 70–99)
HCT VFR BLD AUTO: 27.2 % (ref 40–53)
HGB BLD-MCNC: 9.3 G/DL (ref 13.3–17.7)
LYMPHOCYTES # BLD AUTO: 0.8 10E9/L (ref 0.8–5.3)
LYMPHOCYTES NFR BLD AUTO: 24.3 %
MCH RBC QN AUTO: 30.7 PG (ref 26.5–33)
MCHC RBC AUTO-ENTMCNC: 34.2 G/DL (ref 31.5–36.5)
MCV RBC AUTO: 90 FL (ref 78–100)
MONOCYTES # BLD AUTO: 0.5 10E9/L (ref 0–1.3)
MONOCYTES NFR BLD AUTO: 14.8 %
NEUTROPHILS # BLD AUTO: 2 10E9/L (ref 1.6–8.3)
NEUTROPHILS NFR BLD AUTO: 60.9 %
NRBC # BLD AUTO: 0 10*3/UL
NRBC BLD AUTO-RTO: 1 /100
OVALOCYTES BLD QL SMEAR: SLIGHT
PLATELET # BLD AUTO: 21 10E9/L (ref 150–450)
PLATELET # BLD EST: ABNORMAL 10*3/UL
POIKILOCYTOSIS BLD QL SMEAR: SLIGHT
POTASSIUM SERPL-SCNC: 3.9 MMOL/L (ref 3.4–5.3)
RBC # BLD AUTO: 3.03 10E12/L (ref 4.4–5.9)
SODIUM SERPL-SCNC: 141 MMOL/L (ref 133–144)
TRANSFUSION STATUS PATIENT QL: NORMAL
TRANSFUSION STATUS PATIENT QL: NORMAL
WBC # BLD AUTO: 3.3 10E9/L (ref 4–11)

## 2019-06-19 PROCEDURE — 25000128 H RX IP 250 OP 636: Mod: ZF | Performed by: PHYSICIAN ASSISTANT

## 2019-06-19 PROCEDURE — 96372 THER/PROPH/DIAG INJ SC/IM: CPT

## 2019-06-19 PROCEDURE — G0463 HOSPITAL OUTPT CLINIC VISIT: HCPCS | Mod: ZF

## 2019-06-19 PROCEDURE — 85025 COMPLETE CBC W/AUTO DIFF WBC: CPT | Performed by: STUDENT IN AN ORGANIZED HEALTH CARE EDUCATION/TRAINING PROGRAM

## 2019-06-19 PROCEDURE — 80048 BASIC METABOLIC PNL TOTAL CA: CPT | Performed by: STUDENT IN AN ORGANIZED HEALTH CARE EDUCATION/TRAINING PROGRAM

## 2019-06-19 PROCEDURE — 25000128 H RX IP 250 OP 636: Mod: ZF | Performed by: NURSE PRACTITIONER

## 2019-06-19 RX ORDER — HEPARIN SODIUM,PORCINE 10 UNIT/ML
5 VIAL (ML) INTRAVENOUS ONCE
Status: COMPLETED | OUTPATIENT
Start: 2019-06-19 | End: 2019-06-19

## 2019-06-19 RX ORDER — HEPARIN SODIUM,PORCINE 10 UNIT/ML
5 VIAL (ML) INTRAVENOUS
Status: CANCELLED | OUTPATIENT
Start: 2019-06-20

## 2019-06-19 RX ADMIN — HEPARIN, PORCINE (PF) 10 UNIT/ML INTRAVENOUS SYRINGE 5 ML: at 10:03

## 2019-06-19 RX ADMIN — FILGRASTIM 480 MCG: 480 INJECTION, SOLUTION INTRAVENOUS; SUBCUTANEOUS at 10:33

## 2019-06-19 ASSESSMENT — MIFFLIN-ST. JEOR: SCORE: 1599.92

## 2019-06-19 ASSESSMENT — PAIN SCALES - GENERAL: PAINLEVEL: NO PAIN (0)

## 2019-06-19 NOTE — NURSING NOTE
"Oncology Rooming Note    June 19, 2019 10:12 AM   Angel Yanez is a 59 year old male who presents for:    Chief Complaint   Patient presents with     Blood Draw     Labs drawn via CVC by RN in lab. VS taken. Pt checked in for next appt     RECHECK     Return: Multiple myeloma      Initial Vitals: /79 (BP Location: Right arm, Patient Position: Sitting, Cuff Size: Adult Regular)   Pulse 97   Temp 97.7  F (36.5  C) (Tympanic)   Resp 16   Ht 1.753 m (5' 9.02\")   Wt 80.4 kg (177 lb 4.8 oz)   SpO2 99%   BMI 26.17 kg/m   Estimated body mass index is 26.17 kg/m  as calculated from the following:    Height as of this encounter: 1.753 m (5' 9.02\").    Weight as of this encounter: 80.4 kg (177 lb 4.8 oz). Body surface area is 1.98 meters squared.  No Pain (0) Comment: Data Unavailable   No LMP for male patient.  Allergies reviewed: Yes  Medications reviewed: Yes    Medications: Medication refills not needed today.  Pharmacy name entered into EPIC:    [facility] [place] [facility] [address]  [facility] - [place], [place] - [address]    Clinical concerns: N/A      Yesica Duncan CMA              "

## 2019-06-19 NOTE — PROGRESS NOTES
BMT Clinic  Note     Patient ID:  Angel Yanez is a 59 year old male, day +35 s/p 2nd auto for IgB kappa MM .      Diagnosis MM Multiple myeloma  HCT Type Autologous    Prep Regimen Cytoxan  Melphalan   Primary BMT MD Dr. Lucio       INTERVAL  HISTORY   Guille is feeling good today.  Has a persistent cough that is only slowly improving.  Eating well with no N/V/D.  Afebrile.  No pain, bleeding or rash.  Review of Systems: 10 point ROS negative except as noted above.     PHYSICAL EXAM   Blood pressure 119/83, pulse 108, temperature 99.1  F (37.3  C), temperature source Oral, resp. rate 20, weight 79.7 kg (175 lb 9.6 oz), SpO2 95 %.    General: NAD, pleasant  Eyes:  sclera anicteric and not injected  Nose/Mouth/Throat: OP moist, no ulcerations   Lungs: CTAB, no increased WOB  Cardiovascular: RRR, no M/R/G   Abdominal/Rectal: +BS, soft, NT, ND   Lymphatics: no edema  Skin: no rash or petechaie  Neuro: non-focal, no gross deficits  Additional Findings: CVC site NT, no drainage.    Labs:  Lab Results   Component Value Date    WBC 3.6 (L) 06/20/2019    ANEU 2.0 06/19/2019    HGB 9.7 (L) 06/20/2019    HCT 28.5 (L) 06/20/2019    PLT 12 (LL) 06/20/2019     06/20/2019    POTASSIUM 3.9 06/20/2019    CHLORIDE 107 06/20/2019    CO2 27 06/20/2019     (H) 06/20/2019    BUN 13 06/20/2019    CR 0.84 06/20/2019    MAG 2.0 06/20/2019    INR 1.10 06/03/2019    BILITOTAL 0.4 06/17/2019    AST 19 06/17/2019    ALT 16 06/17/2019    ALKPHOS 86 06/17/2019    PROTTOTAL 6.6 (L) 06/17/2019    ALBUMIN 3.4 06/17/2019          ASSESSMENT/PLAN   Angel Yanez is a 60 yo man D+34 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  BMT/MM:   Slow but steady engraftment; cell dose was only 0.639x10^6. ([facility] [place] - known poor cell dose as failed chemo-mobilization 1/2019.)   - Continue GCSF until ANC>2500 x2 consecutive days.      2.  HEME: Keep Hgb>8 and plts>20K (hemoptysis). Tylenol/Benadryl premeds.  Plan for plt tomorrow  - Pt has  RBC antibodies. Antibody work up completed 5/26.      3.  ID: Afebrile.    - +parainfluenza 3. Ongoing cough, no hemoptysis.  Cough drops, tessalon pearls.   - prophy with HD ACV, Fluconazole, azithro, bactrim 6/17 for pancytopenias.  - CMV neg 6/17.  On CMV vaccine trial     4.  GI:  No n/v/d.  - GI prophy- omeprazole.       5.  FEN/Renal: Creat, lytes wnl. Eating well.      6.  Mood:  Cont Paxil       RTC tomorrow for gcsf & plt    Karla Manzo

## 2019-06-20 ENCOUNTER — APPOINTMENT (OUTPATIENT)
Dept: LAB | Facility: CLINIC | Age: 59
End: 2019-06-20
Attending: NURSE PRACTITIONER
Payer: COMMERCIAL

## 2019-06-20 ENCOUNTER — ONCOLOGY VISIT (OUTPATIENT)
Dept: TRANSPLANT | Facility: CLINIC | Age: 59
End: 2019-06-20
Attending: NURSE PRACTITIONER
Payer: COMMERCIAL

## 2019-06-20 VITALS
SYSTOLIC BLOOD PRESSURE: 119 MMHG | DIASTOLIC BLOOD PRESSURE: 83 MMHG | OXYGEN SATURATION: 95 % | RESPIRATION RATE: 20 BRPM | HEART RATE: 108 BPM | WEIGHT: 175.6 LBS | TEMPERATURE: 99.1 F | BODY MASS INDEX: 25.92 KG/M2

## 2019-06-20 DIAGNOSIS — C90.00 MULTIPLE MYELOMA NOT HAVING ACHIEVED REMISSION (H): Primary | ICD-10-CM

## 2019-06-20 DIAGNOSIS — C90.01 MULTIPLE MYELOMA IN REMISSION (H): ICD-10-CM

## 2019-06-20 LAB
ABO + RH BLD: ABNORMAL
ABO + RH BLD: ABNORMAL
ANION GAP SERPL CALCULATED.3IONS-SCNC: 6 MMOL/L (ref 3–14)
BASOPHILS # BLD AUTO: 0 10E9/L (ref 0–0.2)
BASOPHILS NFR BLD AUTO: 0 %
BLD GP AB SCN SERPL QL: ABNORMAL
BLD PROD TYP BPU: ABNORMAL
BLD PROD TYP BPU: NORMAL
BLD PROD TYP BPU: NORMAL
BLD UNIT ID BPU: 0
BLD UNIT ID BPU: 0
BLOOD BANK CMNT PATIENT-IMP: ABNORMAL
BLOOD BANK CMNT PATIENT-IMP: ABNORMAL
BLOOD BANK CMNT PATIENT-IMP: NORMAL
BLOOD PRODUCT CODE: NORMAL
BLOOD PRODUCT CODE: NORMAL
BPU ID: NORMAL
BPU ID: NORMAL
BUN SERPL-MCNC: 13 MG/DL (ref 7–30)
CALCIUM SERPL-MCNC: 8.6 MG/DL (ref 8.5–10.1)
CHLORIDE SERPL-SCNC: 107 MMOL/L (ref 94–109)
CO2 SERPL-SCNC: 27 MMOL/L (ref 20–32)
CREAT SERPL-MCNC: 0.84 MG/DL (ref 0.66–1.25)
DIFFERENTIAL METHOD BLD: ABNORMAL
EOSINOPHIL # BLD AUTO: 0 10E9/L (ref 0–0.7)
EOSINOPHIL NFR BLD AUTO: 0 %
ERYTHROCYTE [DISTWIDTH] IN BLOOD BY AUTOMATED COUNT: 13.7 % (ref 10–15)
GFR SERPL CREATININE-BSD FRML MDRD: >90 ML/MIN/{1.73_M2}
GLUCOSE SERPL-MCNC: 108 MG/DL (ref 70–99)
HCT VFR BLD AUTO: 28.5 % (ref 40–53)
HGB BLD-MCNC: 9.7 G/DL (ref 13.3–17.7)
LYMPHOCYTES # BLD AUTO: 1.2 10E9/L (ref 0.8–5.3)
LYMPHOCYTES NFR BLD AUTO: 34 %
MAGNESIUM SERPL-MCNC: 2 MG/DL (ref 1.6–2.3)
MCH RBC QN AUTO: 31 PG (ref 26.5–33)
MCHC RBC AUTO-ENTMCNC: 34 G/DL (ref 31.5–36.5)
MCV RBC AUTO: 91 FL (ref 78–100)
METAMYELOCYTES # BLD: 0.1 10E9/L
METAMYELOCYTES NFR BLD MANUAL: 3 %
MONOCYTES # BLD AUTO: 0.4 10E9/L (ref 0–1.3)
MONOCYTES NFR BLD AUTO: 11 %
NEUTROPHILS # BLD AUTO: 1.9 10E9/L (ref 1.6–8.3)
NEUTROPHILS NFR BLD AUTO: 52 %
NUM BPU REQUESTED: 2
OVALOCYTES BLD QL SMEAR: SLIGHT
PLATELET # BLD AUTO: 12 10E9/L (ref 150–450)
PLATELET # BLD EST: ABNORMAL 10*3/UL
POIKILOCYTOSIS BLD QL SMEAR: SLIGHT
POTASSIUM SERPL-SCNC: 3.9 MMOL/L (ref 3.4–5.3)
RBC # BLD AUTO: 3.13 10E12/L (ref 4.4–5.9)
SODIUM SERPL-SCNC: 140 MMOL/L (ref 133–144)
SPECIMEN EXP DATE BLD: ABNORMAL
TRANSFUSION STATUS PATIENT QL: NORMAL
WBC # BLD AUTO: 3.6 10E9/L (ref 4–11)

## 2019-06-20 PROCEDURE — 83735 ASSAY OF MAGNESIUM: CPT | Performed by: NURSE PRACTITIONER

## 2019-06-20 PROCEDURE — 25000128 H RX IP 250 OP 636: Mod: ZF | Performed by: NURSE PRACTITIONER

## 2019-06-20 PROCEDURE — 25000128 H RX IP 250 OP 636: Mod: ZF | Performed by: PHYSICIAN ASSISTANT

## 2019-06-20 PROCEDURE — G0463 HOSPITAL OUTPT CLINIC VISIT: HCPCS | Mod: ZF

## 2019-06-20 PROCEDURE — 85025 COMPLETE CBC W/AUTO DIFF WBC: CPT | Performed by: NURSE PRACTITIONER

## 2019-06-20 PROCEDURE — 80048 BASIC METABOLIC PNL TOTAL CA: CPT | Performed by: NURSE PRACTITIONER

## 2019-06-20 PROCEDURE — 96372 THER/PROPH/DIAG INJ SC/IM: CPT

## 2019-06-20 RX ORDER — HEPARIN SODIUM,PORCINE 10 UNIT/ML
5 VIAL (ML) INTRAVENOUS
Status: CANCELLED | OUTPATIENT
Start: 2019-06-21

## 2019-06-20 RX ORDER — HEPARIN SODIUM,PORCINE 10 UNIT/ML
5 VIAL (ML) INTRAVENOUS
Status: DISCONTINUED | OUTPATIENT
Start: 2019-06-20 | End: 2019-06-20 | Stop reason: HOSPADM

## 2019-06-20 RX ADMIN — SODIUM CHLORIDE, PRESERVATIVE FREE 5 ML: 5 INJECTION INTRAVENOUS at 12:30

## 2019-06-20 RX ADMIN — SODIUM CHLORIDE, PRESERVATIVE FREE 5 ML: 5 INJECTION INTRAVENOUS at 12:29

## 2019-06-20 RX ADMIN — FILGRASTIM 480 MCG: 480 INJECTION, SOLUTION INTRAVENOUS; SUBCUTANEOUS at 13:01

## 2019-06-20 ASSESSMENT — PAIN SCALES - GENERAL: PAINLEVEL: NO PAIN (0)

## 2019-06-20 NOTE — PROGRESS NOTES
BMT Clinic  Note     Patient ID:  Angel Yanez is a 59 year old male, day +35 s/p 2nd auto for IgB kappa MM .      Diagnosis MM Multiple myeloma  HCT Type Autologous    Prep Regimen Cytoxan  Melphalan   Primary BMT MD Dr. Lucio       INTERVAL  HISTORY   Guille is feeling good today.  Has a persistent cough that is only slowly improving.  Eating well with no N/V/D.  Afebrile.  No pain, bleeding or rash.  Review of Systems: 10 point ROS negative except as noted above.     PHYSICAL EXAM   Pulse 115, temperature 98.3  F (36.8  C), temperature source Oral, resp. rate 16, weight 79 kg (174 lb 1.6 oz), SpO2 98 %.    General: NAD, pleasant  Eyes:  sclera anicteric and not injected  Nose/Mouth/Throat: OP moist, no ulcerations   Lungs: CTAB, no increased WOB  Cardiovascular: RRR, no M/R/G   Abdominal/Rectal: +BS, soft, NT, ND   Lymphatics: no edema  Skin: no rash or petechaie  Neuro: non-focal, no gross deficits  Additional Findings: CVC site NT, no drainage.    Labs:  Lab Results   Component Value Date    WBC 4.2 06/21/2019    ANEU 1.9 06/20/2019    HGB 9.6 (L) 06/21/2019    HCT 28.5 (L) 06/21/2019    PLT 4 (LL) 06/21/2019     06/21/2019    POTASSIUM 3.8 06/21/2019    CHLORIDE 108 06/21/2019    CO2 26 06/21/2019     (H) 06/21/2019    BUN 14 06/21/2019    CR 0.90 06/21/2019    MAG 2.0 06/21/2019    INR 1.10 06/03/2019    BILITOTAL 0.4 06/17/2019    AST 19 06/17/2019    ALT 16 06/17/2019    ALKPHOS 86 06/17/2019    PROTTOTAL 6.6 (L) 06/17/2019    ALBUMIN 3.4 06/17/2019          ASSESSMENT/PLAN   Angel Yanez is a 58 yo man D+35 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  BMT/MM:   Slow but steady engraftment; cell dose was only 0.639x10^6. ([facility] [place] - known poor cell dose as failed chemo-mobilization 1/2019.)   - Continue GCSF until ANC>2500 x2 consecutive days.      2.  HEME: Keep Hgb>8 and plts>20K (hemoptysis). Tylenol/Benadryl premeds.  Plt today  - Pt has RBC antibodies. Antibody work up  completed 5/26.      3.  ID: Afebrile.    - +parainfluenza 3. Ongoing cough, no hemoptysis.  Cough drops, tessalon pearls.   - prophy with HD ACV, Fluconazole, azithro, bactrim 6/17 for pancytopenias.  - CMV neg 6/17.  On CMV vaccine trial     4.  GI:  No n/v/d.  - GI prophy- omeprazole.       5.  FEN/Renal: Creat, lytes wnl. Eating well.      6.  Mood:  Cont Paxil       RTC weekend for GCSF & lab & provider visit on Monday  Karla Manzo

## 2019-06-20 NOTE — NURSING NOTE
"Oncology Rooming Note    June 20, 2019 12:36 PM   Angel Yanez is a 59 year old male who presents for:    Chief Complaint   Patient presents with     Blood Draw     Central line labs collected by RN.      RECHECK     RTN: Multiple Myeloma     Initial Vitals: /83 (BP Location: Right arm, Patient Position: Sitting)   Pulse 108   Temp 99.1  F (37.3  C) (Oral)   Resp 20   Wt 79.7 kg (175 lb 9.6 oz)   SpO2 95%   BMI 25.92 kg/m   Estimated body mass index is 25.92 kg/m  as calculated from the following:    Height as of 6/19/19: 1.753 m (5' 9.02\").    Weight as of this encounter: 79.7 kg (175 lb 9.6 oz). Body surface area is 1.97 meters squared.  No Pain (0) Comment: Data Unavailable   No LMP for male patient.  Allergies reviewed: Yes  Medications reviewed: Yes    Medications: Medication refills not needed today.  Pharmacy name entered into EPIC:    [facility] - [place], [place] - [address] [facility] - [place], [place] - [address] [facility] [address]    Clinical concerns: None       Kathy Perez CMA              "

## 2019-06-21 ENCOUNTER — INFUSION THERAPY VISIT (OUTPATIENT)
Dept: TRANSPLANT | Facility: CLINIC | Age: 59
End: 2019-06-21
Attending: NURSE PRACTITIONER
Payer: COMMERCIAL

## 2019-06-21 ENCOUNTER — APPOINTMENT (OUTPATIENT)
Dept: LAB | Facility: CLINIC | Age: 59
End: 2019-06-21
Attending: NURSE PRACTITIONER
Payer: COMMERCIAL

## 2019-06-21 VITALS
RESPIRATION RATE: 16 BRPM | WEIGHT: 174.1 LBS | HEART RATE: 115 BPM | TEMPERATURE: 98.3 F | OXYGEN SATURATION: 98 % | BODY MASS INDEX: 25.7 KG/M2

## 2019-06-21 VITALS
HEART RATE: 107 BPM | DIASTOLIC BLOOD PRESSURE: 70 MMHG | SYSTOLIC BLOOD PRESSURE: 104 MMHG | OXYGEN SATURATION: 94 % | TEMPERATURE: 98.8 F | RESPIRATION RATE: 16 BRPM

## 2019-06-21 DIAGNOSIS — C90.00 MULTIPLE MYELOMA NOT HAVING ACHIEVED REMISSION (H): Primary | ICD-10-CM

## 2019-06-21 DIAGNOSIS — C90.01 MULTIPLE MYELOMA IN REMISSION (H): ICD-10-CM

## 2019-06-21 DIAGNOSIS — C90.01 MULTIPLE MYELOMA IN REMISSION (H): Primary | ICD-10-CM

## 2019-06-21 LAB
ANION GAP SERPL CALCULATED.3IONS-SCNC: 6 MMOL/L (ref 3–14)
BASOPHILS # BLD AUTO: 0 10E9/L (ref 0–0.2)
BASOPHILS NFR BLD AUTO: 0 %
BLD PROD TYP BPU: NORMAL
BLD UNIT ID BPU: 0
BLOOD PRODUCT CODE: NORMAL
BPU ID: NORMAL
BUN SERPL-MCNC: 14 MG/DL (ref 7–30)
CALCIUM SERPL-MCNC: 8.6 MG/DL (ref 8.5–10.1)
CHLORIDE SERPL-SCNC: 108 MMOL/L (ref 94–109)
CO2 SERPL-SCNC: 26 MMOL/L (ref 20–32)
CREAT SERPL-MCNC: 0.9 MG/DL (ref 0.66–1.25)
DIFFERENTIAL METHOD BLD: ABNORMAL
EOSINOPHIL # BLD AUTO: 0 10E9/L (ref 0–0.7)
EOSINOPHIL NFR BLD AUTO: 0.9 %
ERYTHROCYTE [DISTWIDTH] IN BLOOD BY AUTOMATED COUNT: 13.6 % (ref 10–15)
GFR SERPL CREATININE-BSD FRML MDRD: >90 ML/MIN/{1.73_M2}
GLUCOSE SERPL-MCNC: 126 MG/DL (ref 70–99)
HCT VFR BLD AUTO: 28.5 % (ref 40–53)
HGB BLD-MCNC: 9.6 G/DL (ref 13.3–17.7)
LYMPHOCYTES # BLD AUTO: 0.7 10E9/L (ref 0.8–5.3)
LYMPHOCYTES NFR BLD AUTO: 16.7 %
MAGNESIUM SERPL-MCNC: 2 MG/DL (ref 1.6–2.3)
MCH RBC QN AUTO: 30.7 PG (ref 26.5–33)
MCHC RBC AUTO-ENTMCNC: 33.7 G/DL (ref 31.5–36.5)
MCV RBC AUTO: 91 FL (ref 78–100)
MONOCYTES # BLD AUTO: 0.4 10E9/L (ref 0–1.3)
MONOCYTES NFR BLD AUTO: 9.6 %
NEUTROPHILS # BLD AUTO: 3.1 10E9/L (ref 1.6–8.3)
NEUTROPHILS NFR BLD AUTO: 72.8 %
PLATELET # BLD AUTO: 4 10E9/L (ref 150–450)
PLATELET # BLD EST: ABNORMAL 10*3/UL
POTASSIUM SERPL-SCNC: 3.8 MMOL/L (ref 3.4–5.3)
RBC # BLD AUTO: 3.13 10E12/L (ref 4.4–5.9)
RBC MORPH BLD: NORMAL
SODIUM SERPL-SCNC: 139 MMOL/L (ref 133–144)
TRANSFUSION STATUS PATIENT QL: NORMAL
TRANSFUSION STATUS PATIENT QL: NORMAL
WBC # BLD AUTO: 4.2 10E9/L (ref 4–11)

## 2019-06-21 PROCEDURE — 80048 BASIC METABOLIC PNL TOTAL CA: CPT | Performed by: NURSE PRACTITIONER

## 2019-06-21 PROCEDURE — 83735 ASSAY OF MAGNESIUM: CPT | Performed by: NURSE PRACTITIONER

## 2019-06-21 PROCEDURE — 36430 TRANSFUSION BLD/BLD COMPNT: CPT

## 2019-06-21 PROCEDURE — 96372 THER/PROPH/DIAG INJ SC/IM: CPT

## 2019-06-21 PROCEDURE — 40000268 ZZH STATISTIC NO CHARGES: Mod: ZF

## 2019-06-21 PROCEDURE — 25000128 H RX IP 250 OP 636: Mod: ZF | Performed by: NURSE PRACTITIONER

## 2019-06-21 PROCEDURE — P9037 PLATE PHERES LEUKOREDU IRRAD: HCPCS | Performed by: PHYSICIAN ASSISTANT

## 2019-06-21 PROCEDURE — 85025 COMPLETE CBC W/AUTO DIFF WBC: CPT | Performed by: NURSE PRACTITIONER

## 2019-06-21 PROCEDURE — 25000128 H RX IP 250 OP 636: Mod: ZF | Performed by: PHYSICIAN ASSISTANT

## 2019-06-21 RX ORDER — HEPARIN SODIUM,PORCINE 10 UNIT/ML
5 VIAL (ML) INTRAVENOUS
Status: CANCELLED | OUTPATIENT
Start: 2019-06-22

## 2019-06-21 RX ORDER — HEPARIN SODIUM,PORCINE 10 UNIT/ML
5 VIAL (ML) INTRAVENOUS
Status: DISCONTINUED | OUTPATIENT
Start: 2019-06-21 | End: 2019-06-21 | Stop reason: HOSPADM

## 2019-06-21 RX ADMIN — HEPARIN, PORCINE (PF) 10 UNIT/ML INTRAVENOUS SYRINGE 5 ML: at 08:41

## 2019-06-21 RX ADMIN — FILGRASTIM 480 MCG: 480 INJECTION, SOLUTION INTRAVENOUS; SUBCUTANEOUS at 10:07

## 2019-06-21 ASSESSMENT — PAIN SCALES - GENERAL: PAINLEVEL: NO PAIN (0)

## 2019-06-21 NOTE — NURSING NOTE
Chief Complaint   Patient presents with     Infusion     Platelet transfusion, hx MM s/p transplant.

## 2019-06-21 NOTE — NURSING NOTE
"Oncology Rooming Note    June 21, 2019 8:57 AM   Angel Yanez is a 59 year old male who presents for:    Chief Complaint   Patient presents with     RECHECK     Provider visit, hx MM s/p transplant.     Initial Vitals: Pulse 115   Temp 98.3  F (36.8  C) (Oral)   Resp 16   Wt 79 kg (174 lb 1.6 oz)   SpO2 98%   BMI 25.70 kg/m   Estimated body mass index is 25.7 kg/m  as calculated from the following:    Height as of 6/19/19: 1.753 m (5' 9.02\").    Weight as of this encounter: 79 kg (174 lb 1.6 oz). Body surface area is 1.96 meters squared.  No Pain (0) Comment: Data Unavailable   No LMP for male patient.  Allergies reviewed: Yes  Medications reviewed: Yes    Medications: Medication refills not needed today.  Pharmacy name entered into EPIC:    [facility] - [place], [place] - [address] [facility]ACY [facility] - [place], [place] - [address]    Clinical concerns: None      Isamar Tam RN              "

## 2019-06-21 NOTE — PROGRESS NOTES
Infusion Nursing Note:  Angel SUMMER Renata presents today for platelets.    Patient seen by provider today: Yes: Karla aMnzo   present during visit today: Not Applicable.    Note: Patient arrives for platelet transfusion, no premedications needed and tolerated without incident.  GCSF given subcutaneous left arm.  No further replacement needs today.    Intravenous Access:  Hsieh.    Treatment Conditions:  Lab Results   Component Value Date    HGB 9.6 06/21/2019     Lab Results   Component Value Date    WBC 4.2 06/21/2019      Lab Results   Component Value Date    ANEU 3.1 06/21/2019     Lab Results   Component Value Date    PLT 4 06/21/2019      Results reviewed, labs MET treatment parameters, ok to proceed with treatment.      Post Infusion Assessment:  Patient tolerated infusion without incident.  Patient tolerated injection without incident.  No evidence of extravasations.       Discharge Plan:   Patient discharged in stable condition accompanied by: self.  Departure Mode: Ambulatory.  Patient aware of follow up appointments.    Isamar Tam RN

## 2019-06-22 ENCOUNTER — INFUSION THERAPY VISIT (OUTPATIENT)
Dept: TRANSPLANT | Facility: CLINIC | Age: 59
End: 2019-06-22
Attending: PHYSICIAN ASSISTANT
Payer: COMMERCIAL

## 2019-06-22 VITALS
DIASTOLIC BLOOD PRESSURE: 64 MMHG | HEART RATE: 98 BPM | SYSTOLIC BLOOD PRESSURE: 101 MMHG | RESPIRATION RATE: 18 BRPM | OXYGEN SATURATION: 98 % | WEIGHT: 174 LBS | TEMPERATURE: 98.4 F | BODY MASS INDEX: 25.68 KG/M2

## 2019-06-22 DIAGNOSIS — C90.01 MULTIPLE MYELOMA IN REMISSION (H): Primary | ICD-10-CM

## 2019-06-22 LAB
ANION GAP SERPL CALCULATED.3IONS-SCNC: 8 MMOL/L (ref 3–14)
BASOPHILS # BLD AUTO: 0 10E9/L (ref 0–0.2)
BASOPHILS NFR BLD AUTO: 0.4 %
BLD PROD TYP BPU: NORMAL
BLD UNIT ID BPU: 0
BLOOD PRODUCT CODE: NORMAL
BPU ID: NORMAL
BUN SERPL-MCNC: 14 MG/DL (ref 7–30)
CALCIUM SERPL-MCNC: 9 MG/DL (ref 8.5–10.1)
CHLORIDE SERPL-SCNC: 108 MMOL/L (ref 94–109)
CO2 SERPL-SCNC: 24 MMOL/L (ref 20–32)
CREAT SERPL-MCNC: 0.82 MG/DL (ref 0.66–1.25)
DIFFERENTIAL METHOD BLD: ABNORMAL
EOSINOPHIL # BLD AUTO: 0 10E9/L (ref 0–0.7)
EOSINOPHIL NFR BLD AUTO: 0.2 %
ERYTHROCYTE [DISTWIDTH] IN BLOOD BY AUTOMATED COUNT: 13.6 % (ref 10–15)
GFR SERPL CREATININE-BSD FRML MDRD: >90 ML/MIN/{1.73_M2}
GLUCOSE SERPL-MCNC: 130 MG/DL (ref 70–99)
HCT VFR BLD AUTO: 28.1 % (ref 40–53)
HGB BLD-MCNC: 9.4 G/DL (ref 13.3–17.7)
IMM GRANULOCYTES # BLD: 0.2 10E9/L (ref 0–0.4)
IMM GRANULOCYTES NFR BLD: 4.9 %
LYMPHOCYTES # BLD AUTO: 1.2 10E9/L (ref 0.8–5.3)
LYMPHOCYTES NFR BLD AUTO: 27.2 %
MCH RBC QN AUTO: 30.6 PG (ref 26.5–33)
MCHC RBC AUTO-ENTMCNC: 33.5 G/DL (ref 31.5–36.5)
MCV RBC AUTO: 92 FL (ref 78–100)
MONOCYTES # BLD AUTO: 0.6 10E9/L (ref 0–1.3)
MONOCYTES NFR BLD AUTO: 13.9 %
NEUTROPHILS # BLD AUTO: 2.4 10E9/L (ref 1.6–8.3)
NEUTROPHILS NFR BLD AUTO: 53.4 %
NRBC # BLD AUTO: 0 10*3/UL
NRBC BLD AUTO-RTO: 0 /100
PLATELET # BLD AUTO: 4 10E9/L (ref 150–450)
PLATELET # BLD EST: ABNORMAL 10*3/UL
POTASSIUM SERPL-SCNC: 3.8 MMOL/L (ref 3.4–5.3)
RBC # BLD AUTO: 3.07 10E12/L (ref 4.4–5.9)
SODIUM SERPL-SCNC: 140 MMOL/L (ref 133–144)
TRANSFUSION STATUS PATIENT QL: NORMAL
TRANSFUSION STATUS PATIENT QL: NORMAL
WBC # BLD AUTO: 4.5 10E9/L (ref 4–11)

## 2019-06-22 PROCEDURE — P9037 PLATE PHERES LEUKOREDU IRRAD: HCPCS | Performed by: PHYSICIAN ASSISTANT

## 2019-06-22 PROCEDURE — 25000128 H RX IP 250 OP 636: Mod: ZF | Performed by: PHYSICIAN ASSISTANT

## 2019-06-22 PROCEDURE — 85025 COMPLETE CBC W/AUTO DIFF WBC: CPT | Performed by: NURSE PRACTITIONER

## 2019-06-22 PROCEDURE — 36430 TRANSFUSION BLD/BLD COMPNT: CPT

## 2019-06-22 PROCEDURE — 80048 BASIC METABOLIC PNL TOTAL CA: CPT | Performed by: NURSE PRACTITIONER

## 2019-06-22 PROCEDURE — 96372 THER/PROPH/DIAG INJ SC/IM: CPT

## 2019-06-22 RX ORDER — HEPARIN SODIUM,PORCINE 10 UNIT/ML
5 VIAL (ML) INTRAVENOUS
Status: CANCELLED | OUTPATIENT
Start: 2019-06-23

## 2019-06-22 RX ORDER — HEPARIN SODIUM,PORCINE 10 UNIT/ML
5 VIAL (ML) INTRAVENOUS
Status: DISCONTINUED | OUTPATIENT
Start: 2019-06-22 | End: 2019-06-22 | Stop reason: HOSPADM

## 2019-06-22 RX ADMIN — FILGRASTIM 480 MCG: 480 INJECTION, SOLUTION INTRAVENOUS; SUBCUTANEOUS at 08:51

## 2019-06-22 RX ADMIN — HEPARIN, PORCINE (PF) 10 UNIT/ML INTRAVENOUS SYRINGE 5 ML: at 08:31

## 2019-06-22 ASSESSMENT — PAIN SCALES - GENERAL: PAINLEVEL: NO PAIN (0)

## 2019-06-22 NOTE — NURSING NOTE
Patient presents to the [facility] for Neupogen. Order written by Dr. Erickson was completed today. Name and  were verified by patient. See MAR for medication details. Medication was given in the following site: left arm. Patient tolerated injection well and was discharged to home.  Tere Gonzalez CMA

## 2019-06-22 NOTE — NURSING NOTE
"Oncology Rooming Note    June 22, 2019 8:49 AM   Angel Yanez is a 59 year old male who presents for:    Chief Complaint   Patient presents with     Blood Draw     Labs drawn via CVC by RN in lab. Line flushed with saline and heparin. VS taken.      Infusion     G-CSF, possible transfusion s/p bmt txp for multiple myeloma     Initial Vitals: /76 (BP Location: Right arm, Patient Position: Sitting, Cuff Size: Adult Regular)   Pulse 110   Temp 97.4  F (36.3  C) (Oral)   Resp 16   Wt 78.9 kg (174 lb)   SpO2 98%   BMI 25.68 kg/m   Estimated body mass index is 25.68 kg/m  as calculated from the following:    Height as of 6/19/19: 1.753 m (5' 9.02\").    Weight as of this encounter: 78.9 kg (174 lb). Body surface area is 1.96 meters squared.  No Pain (0) Comment: Data Unavailable   No LMP for male patient.  Allergies reviewed: Yes  Medications reviewed: Yes    Medications: Medication refills not needed today.  Pharmacy name entered into EPIC:    [facility] - [place], [place] - [address]  [facility] - [place], [place] - [address] [facility]    Clinical concerns: Patient has no complaints today.      CLARA ELLSWORTH RN              "

## 2019-06-22 NOTE — NURSING NOTE
Chief Complaint   Patient presents with     Blood Draw     Labs drawn via CVC by RN in lab. Line flushed with saline and heparin. VS taken.      María Vidal RN

## 2019-06-23 ENCOUNTER — INFUSION THERAPY VISIT (OUTPATIENT)
Dept: TRANSPLANT | Facility: CLINIC | Age: 59
End: 2019-06-23
Attending: PHYSICIAN ASSISTANT
Payer: COMMERCIAL

## 2019-06-23 ENCOUNTER — APPOINTMENT (OUTPATIENT)
Dept: LAB | Facility: CLINIC | Age: 59
End: 2019-06-23
Attending: INTERNAL MEDICINE
Payer: COMMERCIAL

## 2019-06-23 VITALS
TEMPERATURE: 98.7 F | RESPIRATION RATE: 16 BRPM | SYSTOLIC BLOOD PRESSURE: 100 MMHG | HEART RATE: 94 BPM | WEIGHT: 175.3 LBS | OXYGEN SATURATION: 95 % | DIASTOLIC BLOOD PRESSURE: 71 MMHG | BODY MASS INDEX: 25.87 KG/M2

## 2019-06-23 DIAGNOSIS — C90.01 MULTIPLE MYELOMA IN REMISSION (H): Primary | ICD-10-CM

## 2019-06-23 LAB
ABO + RH BLD: ABNORMAL
ABO + RH BLD: ABNORMAL
ANION GAP SERPL CALCULATED.3IONS-SCNC: 7 MMOL/L (ref 3–14)
BASOPHILS # BLD AUTO: 0 10E9/L (ref 0–0.2)
BASOPHILS NFR BLD AUTO: 0.9 %
BLD GP AB SCN SERPL QL: ABNORMAL
BLD PROD TYP BPU: NORMAL
BLD UNIT ID BPU: 0
BLOOD BANK CMNT PATIENT-IMP: ABNORMAL
BLOOD BANK CMNT PATIENT-IMP: ABNORMAL
BLOOD PRODUCT CODE: NORMAL
BPU ID: NORMAL
BUN SERPL-MCNC: 16 MG/DL (ref 7–30)
CALCIUM SERPL-MCNC: 8.8 MG/DL (ref 8.5–10.1)
CHLORIDE SERPL-SCNC: 109 MMOL/L (ref 94–109)
CO2 SERPL-SCNC: 24 MMOL/L (ref 20–32)
CREAT SERPL-MCNC: 0.88 MG/DL (ref 0.66–1.25)
DACRYOCYTES BLD QL SMEAR: SLIGHT
DIFFERENTIAL METHOD BLD: ABNORMAL
EOSINOPHIL # BLD AUTO: 0 10E9/L (ref 0–0.7)
EOSINOPHIL NFR BLD AUTO: 0.9 %
ERYTHROCYTE [DISTWIDTH] IN BLOOD BY AUTOMATED COUNT: 13.4 % (ref 10–15)
GFR SERPL CREATININE-BSD FRML MDRD: >90 ML/MIN/{1.73_M2}
GLUCOSE SERPL-MCNC: 119 MG/DL (ref 70–99)
HCT VFR BLD AUTO: 27.3 % (ref 40–53)
HGB BLD-MCNC: 9 G/DL (ref 13.3–17.7)
LYMPHOCYTES # BLD AUTO: 1.2 10E9/L (ref 0.8–5.3)
LYMPHOCYTES NFR BLD AUTO: 26.5 %
MCH RBC QN AUTO: 30.5 PG (ref 26.5–33)
MCHC RBC AUTO-ENTMCNC: 33 G/DL (ref 31.5–36.5)
MCV RBC AUTO: 93 FL (ref 78–100)
MONOCYTES # BLD AUTO: 0.3 10E9/L (ref 0–1.3)
MONOCYTES NFR BLD AUTO: 7.1 %
NEUTROPHILS # BLD AUTO: 2.8 10E9/L (ref 1.6–8.3)
NEUTROPHILS NFR BLD AUTO: 64.6 %
NRBC # BLD AUTO: 0.1 10*3/UL
NRBC BLD AUTO-RTO: 3 /100
PLATELET # BLD AUTO: 6 10E9/L (ref 150–450)
PLATELET # BLD EST: ABNORMAL 10*3/UL
POIKILOCYTOSIS BLD QL SMEAR: SLIGHT
POTASSIUM SERPL-SCNC: 3.8 MMOL/L (ref 3.4–5.3)
RBC # BLD AUTO: 2.95 10E12/L (ref 4.4–5.9)
SODIUM SERPL-SCNC: 141 MMOL/L (ref 133–144)
SPECIMEN EXP DATE BLD: ABNORMAL
TRANSFUSION STATUS PATIENT QL: NORMAL
TRANSFUSION STATUS PATIENT QL: NORMAL
WBC # BLD AUTO: 4.4 10E9/L (ref 4–11)

## 2019-06-23 PROCEDURE — 96372 THER/PROPH/DIAG INJ SC/IM: CPT

## 2019-06-23 PROCEDURE — 25000128 H RX IP 250 OP 636: Mod: ZF | Performed by: PHYSICIAN ASSISTANT

## 2019-06-23 PROCEDURE — 85025 COMPLETE CBC W/AUTO DIFF WBC: CPT | Performed by: NURSE PRACTITIONER

## 2019-06-23 PROCEDURE — P9037 PLATE PHERES LEUKOREDU IRRAD: HCPCS | Performed by: PHYSICIAN ASSISTANT

## 2019-06-23 PROCEDURE — 80048 BASIC METABOLIC PNL TOTAL CA: CPT | Performed by: NURSE PRACTITIONER

## 2019-06-23 PROCEDURE — 36430 TRANSFUSION BLD/BLD COMPNT: CPT

## 2019-06-23 PROCEDURE — 86901 BLOOD TYPING SEROLOGIC RH(D): CPT | Performed by: PHYSICIAN ASSISTANT

## 2019-06-23 PROCEDURE — 86900 BLOOD TYPING SEROLOGIC ABO: CPT | Performed by: PHYSICIAN ASSISTANT

## 2019-06-23 PROCEDURE — 86850 RBC ANTIBODY SCREEN: CPT | Performed by: PHYSICIAN ASSISTANT

## 2019-06-23 RX ORDER — HEPARIN SODIUM,PORCINE 10 UNIT/ML
5 VIAL (ML) INTRAVENOUS
Status: CANCELLED | OUTPATIENT
Start: 2019-06-24

## 2019-06-23 RX ORDER — HEPARIN SODIUM,PORCINE 10 UNIT/ML
5 VIAL (ML) INTRAVENOUS
Status: DISCONTINUED | OUTPATIENT
Start: 2019-06-23 | End: 2019-06-23 | Stop reason: HOSPADM

## 2019-06-23 RX ADMIN — HEPARIN, PORCINE (PF) 10 UNIT/ML INTRAVENOUS SYRINGE 5 ML: at 08:36

## 2019-06-23 RX ADMIN — HEPARIN, PORCINE (PF) 10 UNIT/ML INTRAVENOUS SYRINGE 5 ML: at 08:39

## 2019-06-23 RX ADMIN — FILGRASTIM 480 MCG: 480 INJECTION, SOLUTION INTRAVENOUS; SUBCUTANEOUS at 09:10

## 2019-06-23 ASSESSMENT — PAIN SCALES - GENERAL: PAINLEVEL: NO PAIN (0)

## 2019-06-23 NOTE — PROGRESS NOTES
Infusion Nursing Note:  Angel Yanez presents today for plts, GCSF.  Patient seen by provider today: No   present during visit today: Not Applicable.    Note: Pt received GCSF (from JORGE LUIS Chavez) and 1u plts.  Tolerated without incident.    Intravenous Access:  Hsieh.    Treatment Conditions:  Lab Results   Component Value Date    HGB 9.0 06/23/2019     Lab Results   Component Value Date    WBC 4.4 06/23/2019      Lab Results   Component Value Date    ANEU 2.8 06/23/2019     Lab Results   Component Value Date    PLT 6 06/23/2019      Results reviewed, labs MET treatment parameters, ok to proceed with treatment.      Post Infusion Assessment:  Patient tolerated infusion without incident.  Blood return noted pre and post infusion.  Site patent and intact, free from redness, edema or discomfort.  No evidence of extravasations.       Discharge Plan:   Discharge instructions reviewed with: Patient.  Patient and/or family verbalized understanding of discharge instructions and all questions answered.  Patient discharged in stable condition accompanied by: self.  Departure Mode: Ambulatory.    Rosana Brown RN

## 2019-06-24 ENCOUNTER — ONCOLOGY VISIT (OUTPATIENT)
Dept: TRANSPLANT | Facility: CLINIC | Age: 59
End: 2019-06-24
Attending: PHYSICIAN ASSISTANT
Payer: COMMERCIAL

## 2019-06-24 VITALS
RESPIRATION RATE: 16 BRPM | HEART RATE: 105 BPM | WEIGHT: 176.1 LBS | SYSTOLIC BLOOD PRESSURE: 113 MMHG | BODY MASS INDEX: 26.08 KG/M2 | OXYGEN SATURATION: 97 % | TEMPERATURE: 97.1 F | DIASTOLIC BLOOD PRESSURE: 81 MMHG | HEIGHT: 69 IN

## 2019-06-24 VITALS
HEART RATE: 102 BPM | DIASTOLIC BLOOD PRESSURE: 79 MMHG | TEMPERATURE: 98.7 F | OXYGEN SATURATION: 99 % | SYSTOLIC BLOOD PRESSURE: 126 MMHG

## 2019-06-24 DIAGNOSIS — C90.01 MULTIPLE MYELOMA IN REMISSION (H): Primary | ICD-10-CM

## 2019-06-24 DIAGNOSIS — C90.01 MULTIPLE MYELOMA IN REMISSION (H): ICD-10-CM

## 2019-06-24 LAB
ALBUMIN SERPL-MCNC: 3.6 G/DL (ref 3.4–5)
ALP SERPL-CCNC: 93 U/L (ref 40–150)
ALT SERPL W P-5'-P-CCNC: 16 U/L (ref 0–70)
ANION GAP SERPL CALCULATED.3IONS-SCNC: 6 MMOL/L (ref 3–14)
AST SERPL W P-5'-P-CCNC: 19 U/L (ref 0–45)
BASOPHILS # BLD AUTO: 0 10E9/L (ref 0–0.2)
BASOPHILS NFR BLD AUTO: 0 %
BILIRUB SERPL-MCNC: 0.4 MG/DL (ref 0.2–1.3)
BLD PROD TYP BPU: NORMAL
BLD PROD TYP BPU: NORMAL
BLD UNIT ID BPU: 0
BLOOD PRODUCT CODE: NORMAL
BPU ID: NORMAL
BUN SERPL-MCNC: 14 MG/DL (ref 7–30)
CALCIUM SERPL-MCNC: 8.9 MG/DL (ref 8.5–10.1)
CHLORIDE SERPL-SCNC: 108 MMOL/L (ref 94–109)
CO2 SERPL-SCNC: 25 MMOL/L (ref 20–32)
CREAT SERPL-MCNC: 0.87 MG/DL (ref 0.66–1.25)
DACRYOCYTES BLD QL SMEAR: SLIGHT
DIFFERENTIAL METHOD BLD: ABNORMAL
EOSINOPHIL # BLD AUTO: 0 10E9/L (ref 0–0.7)
EOSINOPHIL NFR BLD AUTO: 0 %
ERYTHROCYTE [DISTWIDTH] IN BLOOD BY AUTOMATED COUNT: 13.3 % (ref 10–15)
GFR SERPL CREATININE-BSD FRML MDRD: >90 ML/MIN/{1.73_M2}
GLUCOSE SERPL-MCNC: 99 MG/DL (ref 70–99)
HCT VFR BLD AUTO: 26 % (ref 40–53)
HGB BLD-MCNC: 8.8 G/DL (ref 13.3–17.7)
LYMPHOCYTES # BLD AUTO: 1.2 10E9/L (ref 0.8–5.3)
LYMPHOCYTES NFR BLD AUTO: 22.6 %
MAGNESIUM SERPL-MCNC: 2.1 MG/DL (ref 1.6–2.3)
MCH RBC QN AUTO: 30.9 PG (ref 26.5–33)
MCHC RBC AUTO-ENTMCNC: 33.8 G/DL (ref 31.5–36.5)
MCV RBC AUTO: 91 FL (ref 78–100)
MONOCYTES # BLD AUTO: 0.5 10E9/L (ref 0–1.3)
MONOCYTES NFR BLD AUTO: 10.4 %
NEUTROPHILS # BLD AUTO: 3.5 10E9/L (ref 1.6–8.3)
NEUTROPHILS NFR BLD AUTO: 67 %
NUM BPU REQUESTED: 1
PLATELET # BLD AUTO: 9 10E9/L (ref 150–450)
PLATELET # BLD EST: ABNORMAL 10*3/UL
POIKILOCYTOSIS BLD QL SMEAR: SLIGHT
POLYCHROMASIA BLD QL SMEAR: SLIGHT
POTASSIUM SERPL-SCNC: 3.9 MMOL/L (ref 3.4–5.3)
PROT SERPL-MCNC: 6.8 G/DL (ref 6.8–8.8)
RBC # BLD AUTO: 2.85 10E12/L (ref 4.4–5.9)
SODIUM SERPL-SCNC: 139 MMOL/L (ref 133–144)
TRANSFUSION STATUS PATIENT QL: NORMAL
TRANSFUSION STATUS PATIENT QL: NORMAL
WBC # BLD AUTO: 5.2 10E9/L (ref 4–11)

## 2019-06-24 PROCEDURE — 86900 BLOOD TYPING SEROLOGIC ABO: CPT | Performed by: PHYSICIAN ASSISTANT

## 2019-06-24 PROCEDURE — 86902 BLOOD TYPE ANTIGEN DONOR EA: CPT | Performed by: PHYSICIAN ASSISTANT

## 2019-06-24 PROCEDURE — 25000128 H RX IP 250 OP 636: Mod: ZF | Performed by: INTERNAL MEDICINE

## 2019-06-24 PROCEDURE — 86901 BLOOD TYPING SEROLOGIC RH(D): CPT | Performed by: PHYSICIAN ASSISTANT

## 2019-06-24 PROCEDURE — 86870 RBC ANTIBODY IDENTIFICATION: CPT | Performed by: PHYSICIAN ASSISTANT

## 2019-06-24 PROCEDURE — G0463 HOSPITAL OUTPT CLINIC VISIT: HCPCS | Mod: 25

## 2019-06-24 PROCEDURE — 85025 COMPLETE CBC W/AUTO DIFF WBC: CPT | Performed by: NURSE PRACTITIONER

## 2019-06-24 PROCEDURE — 36430 TRANSFUSION BLD/BLD COMPNT: CPT

## 2019-06-24 PROCEDURE — 80053 COMPREHEN METABOLIC PANEL: CPT | Performed by: NURSE PRACTITIONER

## 2019-06-24 PROCEDURE — 86970 RBC PRETX INCUBATJ W/CHEMICL: CPT | Performed by: PHYSICIAN ASSISTANT

## 2019-06-24 PROCEDURE — 86880 COOMBS TEST DIRECT: CPT | Performed by: PHYSICIAN ASSISTANT

## 2019-06-24 PROCEDURE — P9037 PLATE PHERES LEUKOREDU IRRAD: HCPCS | Performed by: PHYSICIAN ASSISTANT

## 2019-06-24 PROCEDURE — 83735 ASSAY OF MAGNESIUM: CPT | Performed by: NURSE PRACTITIONER

## 2019-06-24 PROCEDURE — 86923 COMPATIBILITY TEST ELECTRIC: CPT | Performed by: PHYSICIAN ASSISTANT

## 2019-06-24 PROCEDURE — 86850 RBC ANTIBODY SCREEN: CPT | Performed by: PHYSICIAN ASSISTANT

## 2019-06-24 RX ORDER — HEPARIN SODIUM,PORCINE 10 UNIT/ML
5 VIAL (ML) INTRAVENOUS
Status: DISCONTINUED | OUTPATIENT
Start: 2019-06-24 | End: 2019-06-24 | Stop reason: HOSPADM

## 2019-06-24 RX ADMIN — HEPARIN, PORCINE (PF) 10 UNIT/ML INTRAVENOUS SYRINGE 5 ML: at 09:59

## 2019-06-24 RX ADMIN — HEPARIN, PORCINE (PF) 10 UNIT/ML INTRAVENOUS SYRINGE 5 ML: at 09:58

## 2019-06-24 ASSESSMENT — PAIN SCALES - GENERAL: PAINLEVEL: NO PAIN (0)

## 2019-06-24 ASSESSMENT — MIFFLIN-ST. JEOR: SCORE: 1594.47

## 2019-06-24 NOTE — NURSING NOTE
"Oncology Rooming Note    June 24, 2019 10:14 AM   Angel Yanez is a 59 year old male who presents for:    Chief Complaint   Patient presents with     Blood Draw     labs drawn from cvc by rn.  vs taken     RECHECK     Return: Multiple myeloma      Initial Vitals: /81 (BP Location: Right arm, Patient Position: Sitting, Cuff Size: Adult Regular)   Pulse 105   Temp 97.1  F (36.2  C) (Tympanic)   Resp 16   Ht 1.753 m (5' 9.02\")   Wt 79.9 kg (176 lb 1.6 oz)   SpO2 97%   BMI 25.99 kg/m   Estimated body mass index is 25.99 kg/m  as calculated from the following:    Height as of this encounter: 1.753 m (5' 9.02\").    Weight as of this encounter: 79.9 kg (176 lb 1.6 oz). Body surface area is 1.97 meters squared.  No Pain (0) Comment: Data Unavailable   No LMP for male patient.  Allergies reviewed: Yes  Medications reviewed: Yes    Medications: Medication refills not needed today.  Pharmacy name entered into EPIC:    [facility] [place] [facility] [address]  [facility] - [place], [place] - [address]    Clinical concerns: N/A       Yesica Duncan CMA              "

## 2019-06-24 NOTE — NURSING NOTE
Dressing change completed sterile technique used, betadine used for cleaning. Site WDL.  Patient tolerated dressing change. See Dock Flowsheet.     Yesica Duncan MA

## 2019-06-24 NOTE — PROGRESS NOTES
Infusion Nursing Note:  Angel Yanez presents today for add-on transfusion.    Patient seen by provider today: Yes: Lana Reynolds   present during visit today: Not Applicable.    Note: Labs were monitored.    Intravenous Access:  Hsieh.    Treatment Conditions:  Patient received an add-on platelet transfusion for a platelet count of 9.      Post Infusion Assessment:  Patient tolerated transfusion without incident.       Discharge Plan:   Patient discharged in stable condition accompanied by: self.    CLARA ELLSWORTH RN

## 2019-06-24 NOTE — PROGRESS NOTES
"BMT Clinic  Note     Patient ID:  Angel Yanez is a 58 yo man D+38 s/p 2nd auto for IgB kappa MM .      Diagnosis MM Multiple myeloma  HCT Type Autologous    Prep Regimen Cytoxan  Melphalan   Primary BMT MD Dr. Lucio       INTERVAL  HISTORY   Feeling well. Eating well without n/v/d/c. Still with a daytime cough, stable to improved. No fevers, bleeding, or rash.     Review of Systems: 10 point ROS negative except as noted above.     PHYSICAL EXAM   Blood pressure 113/81, pulse 105, temperature 97.1  F (36.2  C), temperature source Tympanic, resp. rate 16, height 1.753 m (5' 9.02\"), weight 79.9 kg (176 lb 1.6 oz), SpO2 97 %.    Wt Readings from Last 4 Encounters:   06/24/19 79.9 kg (176 lb 1.6 oz)   06/23/19 79.5 kg (175 lb 4.8 oz)   06/22/19 78.9 kg (174 lb)   06/21/19 79 kg (174 lb 1.6 oz)       General: NAD, pleasant  Eyes:  sclera anicteric and not injected  Nose/Mouth/Throat: OP moist, no ulcerations   Lungs: CTAB, no increased WOB  Cardiovascular: RRR, no M/R/G   Abdominal/Rectal: +BS, soft, NT, ND   Lymphatics: no edema  Skin: no rash or petechaie  Neuro: non-focal, no gross deficits  Additional Findings: CVC site NT, no drainage.    Labs:   Lab Results   Component Value Date    WBC 5.2 06/24/2019    ANEU 3.5 06/24/2019    HGB 8.8 (L) 06/24/2019    HCT 26.0 (L) 06/24/2019    PLT 9 (LL) 06/24/2019     06/24/2019    POTASSIUM 3.9 06/24/2019    CHLORIDE 108 06/24/2019    CO2 25 06/24/2019    GLC 99 06/24/2019    BUN 14 06/24/2019    CR 0.87 06/24/2019    MAG 2.1 06/24/2019    INR 1.10 06/03/2019    BILITOTAL 0.4 06/24/2019    AST 19 06/24/2019    ALT 16 06/24/2019    ALKPHOS 93 06/24/2019    PROTTOTAL 6.8 06/24/2019    ALBUMIN 3.6 06/24/2019          ASSESSMENT/PLAN   Angel Yanez is a 58 yo man D+38 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  BMT/MM:   Slow but steady engraftment; cell dose was only 0.639x10^6. ([facility] [place] - known poor cell dose as failed chemo-mobilization 1/2019.)   - last " dose GCSF 6/23.       2.  HEME: Keep Hgb>8 and plts>20K (hemoptysis). Tylenol/Benadryl premeds.  Plt today  - Pt has RBC antibodies. Antibody work up completed 5/26.      3.  ID: Afebrile.    +parainfluenza 3 (5/30). Ongoing cough, no hemoptysis.  Cough drops, tessalon pearls prn.   - prophy with ACV, Fluconazole, Pentamidine (6/17). Stop prophy azithro 6/24 as ANC is recovered.   - CMV neg 6/17.  On CMV vaccine trial     4.  GI:  No complaints.  - GI prophy- omeprazole.       5.  FEN/Renal: Creat, lytes wnl. Eating well.      6.  Mood:  Cont Paxil       Plan:  Stop GCSF and azithro  plts today  RTC tomorrow for possible plts, follow-up (future appt planning pending counts)    JESICA MenaC  [phone]

## 2019-06-25 ENCOUNTER — APPOINTMENT (OUTPATIENT)
Dept: LAB | Facility: CLINIC | Age: 59
End: 2019-06-25
Attending: PHYSICIAN ASSISTANT
Payer: COMMERCIAL

## 2019-06-25 ENCOUNTER — ONCOLOGY VISIT (OUTPATIENT)
Dept: TRANSPLANT | Facility: CLINIC | Age: 59
End: 2019-06-25
Attending: PHYSICIAN ASSISTANT
Payer: COMMERCIAL

## 2019-06-25 VITALS
WEIGHT: 175.4 LBS | TEMPERATURE: 98 F | BODY MASS INDEX: 25.89 KG/M2 | HEART RATE: 103 BPM | SYSTOLIC BLOOD PRESSURE: 110 MMHG | OXYGEN SATURATION: 98 % | RESPIRATION RATE: 18 BRPM | DIASTOLIC BLOOD PRESSURE: 74 MMHG

## 2019-06-25 VITALS
RESPIRATION RATE: 16 BRPM | OXYGEN SATURATION: 98 % | SYSTOLIC BLOOD PRESSURE: 132 MMHG | TEMPERATURE: 98.7 F | HEART RATE: 87 BPM | DIASTOLIC BLOOD PRESSURE: 85 MMHG

## 2019-06-25 DIAGNOSIS — C90.01 MULTIPLE MYELOMA IN REMISSION (H): Primary | ICD-10-CM

## 2019-06-25 LAB
ABO + RH BLD: ABNORMAL
ABO + RH BLD: ABNORMAL
ANION GAP SERPL CALCULATED.3IONS-SCNC: 8 MMOL/L (ref 3–14)
BASOPHILS # BLD AUTO: 0 10E9/L (ref 0–0.2)
BASOPHILS NFR BLD AUTO: 0.3 %
BLD GP AB SCN SERPL QL: ABNORMAL
BLD PROD TYP BPU: ABNORMAL
BLD PROD TYP BPU: NORMAL
BLD UNIT ID BPU: 0
BLOOD BANK CMNT PATIENT-IMP: ABNORMAL
BLOOD BANK CMNT PATIENT-IMP: ABNORMAL
BLOOD PRODUCT CODE: NORMAL
BPU ID: NORMAL
BUN SERPL-MCNC: 12 MG/DL (ref 7–30)
CALCIUM SERPL-MCNC: 8.9 MG/DL (ref 8.5–10.1)
CHLORIDE SERPL-SCNC: 106 MMOL/L (ref 94–109)
CMV DNA SPEC NAA+PROBE-ACNC: NORMAL [IU]/ML
CMV DNA SPEC NAA+PROBE-LOG#: NORMAL {LOG_IU}/ML
CO2 SERPL-SCNC: 25 MMOL/L (ref 20–32)
CREAT SERPL-MCNC: 0.82 MG/DL (ref 0.66–1.25)
DIFFERENTIAL METHOD BLD: ABNORMAL
EOSINOPHIL # BLD AUTO: 0 10E9/L (ref 0–0.7)
EOSINOPHIL NFR BLD AUTO: 0.3 %
ERYTHROCYTE [DISTWIDTH] IN BLOOD BY AUTOMATED COUNT: 13.3 % (ref 10–15)
GFR SERPL CREATININE-BSD FRML MDRD: >90 ML/MIN/{1.73_M2}
GLUCOSE SERPL-MCNC: 122 MG/DL (ref 70–99)
HCT VFR BLD AUTO: 25.8 % (ref 40–53)
HGB BLD-MCNC: 8.4 G/DL (ref 13.3–17.7)
IMM GRANULOCYTES # BLD: 0 10E9/L (ref 0–0.4)
IMM GRANULOCYTES NFR BLD: 0.9 %
LYMPHOCYTES # BLD AUTO: 1 10E9/L (ref 0.8–5.3)
LYMPHOCYTES NFR BLD AUTO: 28.9 %
MCH RBC QN AUTO: 30.3 PG (ref 26.5–33)
MCHC RBC AUTO-ENTMCNC: 32.6 G/DL (ref 31.5–36.5)
MCV RBC AUTO: 93 FL (ref 78–100)
MONOCYTES # BLD AUTO: 0.6 10E9/L (ref 0–1.3)
MONOCYTES NFR BLD AUTO: 16.6 %
NEUTROPHILS # BLD AUTO: 1.8 10E9/L (ref 1.6–8.3)
NEUTROPHILS NFR BLD AUTO: 53 %
NRBC # BLD AUTO: 0 10*3/UL
NRBC BLD AUTO-RTO: 0 /100
NUM BPU REQUESTED: 2
PLATELET # BLD AUTO: 13 10E9/L (ref 150–450)
POTASSIUM SERPL-SCNC: 3.8 MMOL/L (ref 3.4–5.3)
RBC # BLD AUTO: 2.77 10E12/L (ref 4.4–5.9)
SODIUM SERPL-SCNC: 140 MMOL/L (ref 133–144)
SPECIMEN EXP DATE BLD: ABNORMAL
SPECIMEN SOURCE: NORMAL
TRANSFUSION STATUS PATIENT QL: NORMAL
TRANSFUSION STATUS PATIENT QL: NORMAL
WBC # BLD AUTO: 3.3 10E9/L (ref 4–11)

## 2019-06-25 PROCEDURE — 80048 BASIC METABOLIC PNL TOTAL CA: CPT | Performed by: PHYSICIAN ASSISTANT

## 2019-06-25 PROCEDURE — 85025 COMPLETE CBC W/AUTO DIFF WBC: CPT | Performed by: PHYSICIAN ASSISTANT

## 2019-06-25 PROCEDURE — 25000128 H RX IP 250 OP 636: Mod: ZF | Performed by: PHYSICIAN ASSISTANT

## 2019-06-25 PROCEDURE — P9037 PLATE PHERES LEUKOREDU IRRAD: HCPCS | Performed by: PHYSICIAN ASSISTANT

## 2019-06-25 PROCEDURE — 36430 TRANSFUSION BLD/BLD COMPNT: CPT

## 2019-06-25 PROCEDURE — G0463 HOSPITAL OUTPT CLINIC VISIT: HCPCS | Mod: ZF

## 2019-06-25 RX ORDER — HEPARIN SODIUM,PORCINE 10 UNIT/ML
5 VIAL (ML) INTRAVENOUS
Status: DISCONTINUED | OUTPATIENT
Start: 2019-06-25 | End: 2019-06-25 | Stop reason: HOSPADM

## 2019-06-25 RX ADMIN — HEPARIN, PORCINE (PF) 10 UNIT/ML INTRAVENOUS SYRINGE 5 ML: at 08:03

## 2019-06-25 ASSESSMENT — PAIN SCALES - GENERAL: PAINLEVEL: NO PAIN (0)

## 2019-06-25 NOTE — PROGRESS NOTES
Infusion Nursing Note:  Angel Yanez presents today for plts.    Patient seen by provider today: No   present during visit today: Not Applicable.    Note: Pt given 1u plts.  Tolerated without incident.    Intravenous Access:  Hsieh.    Treatment Conditions:  Lab Results   Component Value Date    HGB 8.4 06/25/2019     Lab Results   Component Value Date    WBC 3.3 06/25/2019      Lab Results   Component Value Date    ANEU 1.8 06/25/2019     Lab Results   Component Value Date    PLT 13 06/25/2019      Results reviewed, labs MET treatment parameters, ok to proceed with treatment.      Post Infusion Assessment:  Patient tolerated infusion without incident.  Blood return noted pre and post infusion.  Site patent and intact, free from redness, edema or discomfort.  No evidence of extravasations.       Discharge Plan:   Discharge instructions reviewed with: Patient.  Patient and/or family verbalized understanding of discharge instructions and all questions answered.  Patient discharged in stable condition accompanied by: self.  Departure Mode: Ambulatory.    Rosana Brown RN

## 2019-06-25 NOTE — NURSING NOTE
"Oncology Rooming Note    June 25, 2019 8:20 AM   Angel Yanez is a 59 year old male who presents for:    Chief Complaint   Patient presents with     Blood Draw     Right DL CVC line, + blood noted from Red/purple line, labs drawn from the red today, flushed with saline and heparin, vitals completed, checked into next appointment.     RECHECK     RTN- MM     Initial Vitals: /74 (BP Location: Right arm, Patient Position: Sitting, Cuff Size: Adult Regular)   Pulse 103   Temp 98  F (36.7  C) (Oral)   Resp 18   Wt 79.6 kg (175 lb 6.4 oz)   SpO2 98%   BMI 25.89 kg/m   Estimated body mass index is 25.89 kg/m  as calculated from the following:    Height as of 6/24/19: 1.753 m (5' 9.02\").    Weight as of this encounter: 79.6 kg (175 lb 6.4 oz). Body surface area is 1.97 meters squared.  No Pain (0) Comment: Data Unavailable   No LMP for male patient.  Allergies reviewed: Yes  Medications reviewed: Yes    Medications: Medication refills not needed today.  Pharmacy name entered into EPIC:    [facility] - [place], [place] - [address]  [facility] - [place] - [address]    Clinical concerns: None       Kathy Perez CMA            "

## 2019-06-25 NOTE — NURSING NOTE
Chief Complaint   Patient presents with     Blood Draw     Right DL CVC line, + blood noted from Red/purple line, labs drawn from the red today, flushed with saline and heparin, vitals completed, checked into next appointment.   Tana MendezRN

## 2019-06-25 NOTE — PROGRESS NOTES
BMT Clinic  Note     Patient ID:  Angel Yanez is a 58 yo man Day +39 s/p 2nd auto for IgB kappa MM .      Diagnosis MM Multiple myeloma  HCT Type Autologous    Prep Regimen Cytoxan  Melphalan   Primary BMT MD Dr. Lucio       INTERVAL  HISTORY   Feeling well. Eating well without n/v/d/c. Still with a daytime cough, stable but improved overall. No fevers, bleeding, or rash.  No chest pain or palpitations.  No new medical complaints.    Review of Systems: 10 point ROS negative except as noted above.     PHYSICAL EXAM   Blood pressure 110/74, pulse 103, temperature 98  F (36.7  C), temperature source Oral, resp. rate 18, weight 79.6 kg (175 lb 6.4 oz), SpO2 98 %.    Wt Readings from Last 4 Encounters:   06/25/19 79.6 kg (175 lb 6.4 oz)   06/24/19 79.9 kg (176 lb 1.6 oz)   06/23/19 79.5 kg (175 lb 4.8 oz)   06/22/19 78.9 kg (174 lb)     General: NAD, pleasant  Eyes:  sclera anicteric and not injected  Nose/Mouth/Throat: OP moist, no ulcerations   Lungs: CTAB, no increased WOB  Cardiovascular: mildly tachycardic, regular rhythm, no M/R/G   Abdominal/Rectal: +BS, soft, NT, ND   Lymphatics: no edema  Skin: no rash or petechaie  Neuro: non-focal, no gross deficits  Additional Findings: CVC site NT, no drainage.    Labs:   Lab Results   Component Value Date    WBC 5.2 06/24/2019    ANEU 3.5 06/24/2019    HGB 8.8 (L) 06/24/2019    HCT 26.0 (L) 06/24/2019    PLT 9 (LL) 06/24/2019     06/24/2019    POTASSIUM 3.9 06/24/2019    CHLORIDE 108 06/24/2019    CO2 25 06/24/2019    GLC 99 06/24/2019    BUN 14 06/24/2019    CR 0.87 06/24/2019    MAG 2.1 06/24/2019    INR 1.10 06/03/2019    BILITOTAL 0.4 06/24/2019    AST 19 06/24/2019    ALT 16 06/24/2019    ALKPHOS 93 06/24/2019    PROTTOTAL 6.8 06/24/2019    ALBUMIN 3.6 06/24/2019          ASSESSMENT/PLAN   Angel Yanez is a 58 yo man Day +39 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  BMT/MM:   Slow but steady engraftment; cell dose was only 0.639x10^6. ([facility] [place]  - known poor cell dose as failed chemo-mobilization 1/2019.)   - last dose GCSF 6/23.       2.  HEME: Keep Hgb>8g/dL and plts>20K (hemoptysis). Tylenol/Benadryl premeds.  Plt today  - Pt has RBC antibodies. Antibody work up completed 5/26.      3.  ID: Afebrile.    +parainfluenza 3 (5/30). Ongoing cough, no hemoptysis.  Cough drops, tessalon pearls prn.   - prophy with ACV, Fluconazole, Pentamidine (6/17). Stop prophy azithro 6/24 as ANC is recovered.   - CMV neg 6/17.  On CMV vaccine trial     4.  GI:  No complaints.  - GI prophy- omeprazole.       5.  FEN/Renal: Creat, lytes wnl. Eating well.      6.  Mood:  Cont Paxil       Plan:  plts today  RTC tomorrow for labs, possible plts/pRBC   rtc for lab/provider/infusion (poss plt/pRBC) Thursday    Darlin Coulter pa-c  [phone]

## 2019-06-26 ENCOUNTER — INFUSION THERAPY VISIT (OUTPATIENT)
Dept: TRANSPLANT | Facility: CLINIC | Age: 59
End: 2019-06-26
Attending: PHYSICIAN ASSISTANT
Payer: COMMERCIAL

## 2019-06-26 ENCOUNTER — APPOINTMENT (OUTPATIENT)
Dept: LAB | Facility: CLINIC | Age: 59
End: 2019-06-26
Attending: PHYSICIAN ASSISTANT
Payer: COMMERCIAL

## 2019-06-26 VITALS
WEIGHT: 175.8 LBS | RESPIRATION RATE: 16 BRPM | HEART RATE: 104 BPM | OXYGEN SATURATION: 100 % | TEMPERATURE: 98.4 F | BODY MASS INDEX: 25.95 KG/M2 | SYSTOLIC BLOOD PRESSURE: 106 MMHG | DIASTOLIC BLOOD PRESSURE: 69 MMHG

## 2019-06-26 DIAGNOSIS — C90.01 MULTIPLE MYELOMA IN REMISSION (H): Primary | ICD-10-CM

## 2019-06-26 LAB
ANION GAP SERPL CALCULATED.3IONS-SCNC: 8 MMOL/L (ref 3–14)
BASOPHILS # BLD AUTO: 0 10E9/L (ref 0–0.2)
BASOPHILS NFR BLD AUTO: 0 %
BLD PROD TYP BPU: NORMAL
BLD UNIT ID BPU: 0
BLOOD PRODUCT CODE: NORMAL
BPU ID: NORMAL
BUN SERPL-MCNC: 15 MG/DL (ref 7–30)
CALCIUM SERPL-MCNC: 8.6 MG/DL (ref 8.5–10.1)
CHLORIDE SERPL-SCNC: 108 MMOL/L (ref 94–109)
CO2 SERPL-SCNC: 24 MMOL/L (ref 20–32)
CREAT SERPL-MCNC: 0.84 MG/DL (ref 0.66–1.25)
DIFFERENTIAL METHOD BLD: ABNORMAL
EOSINOPHIL # BLD AUTO: 0 10E9/L (ref 0–0.7)
EOSINOPHIL NFR BLD AUTO: 0.4 %
ERYTHROCYTE [DISTWIDTH] IN BLOOD BY AUTOMATED COUNT: 13.3 % (ref 10–15)
GFR SERPL CREATININE-BSD FRML MDRD: >90 ML/MIN/{1.73_M2}
GLUCOSE SERPL-MCNC: 111 MG/DL (ref 70–99)
HCT VFR BLD AUTO: 24.5 % (ref 40–53)
HGB BLD-MCNC: 8.3 G/DL (ref 13.3–17.7)
IMM GRANULOCYTES # BLD: 0 10E9/L (ref 0–0.4)
IMM GRANULOCYTES NFR BLD: 0.4 %
LYMPHOCYTES # BLD AUTO: 1.2 10E9/L (ref 0.8–5.3)
LYMPHOCYTES NFR BLD AUTO: 41.2 %
MCH RBC QN AUTO: 30.9 PG (ref 26.5–33)
MCHC RBC AUTO-ENTMCNC: 33.9 G/DL (ref 31.5–36.5)
MCV RBC AUTO: 91 FL (ref 78–100)
MONOCYTES # BLD AUTO: 0.5 10E9/L (ref 0–1.3)
MONOCYTES NFR BLD AUTO: 17.6 %
NEUTROPHILS # BLD AUTO: 1.2 10E9/L (ref 1.6–8.3)
NEUTROPHILS NFR BLD AUTO: 40.4 %
NRBC # BLD AUTO: 0 10*3/UL
NRBC BLD AUTO-RTO: 0 /100
PLATELET # BLD AUTO: 17 10E9/L (ref 150–450)
POTASSIUM SERPL-SCNC: 3.8 MMOL/L (ref 3.4–5.3)
RBC # BLD AUTO: 2.69 10E12/L (ref 4.4–5.9)
SODIUM SERPL-SCNC: 140 MMOL/L (ref 133–144)
TRANSFUSION STATUS PATIENT QL: NORMAL
TRANSFUSION STATUS PATIENT QL: NORMAL
WBC # BLD AUTO: 2.8 10E9/L (ref 4–11)

## 2019-06-26 PROCEDURE — 36430 TRANSFUSION BLD/BLD COMPNT: CPT

## 2019-06-26 PROCEDURE — 85025 COMPLETE CBC W/AUTO DIFF WBC: CPT | Performed by: PHYSICIAN ASSISTANT

## 2019-06-26 PROCEDURE — P9037 PLATE PHERES LEUKOREDU IRRAD: HCPCS | Performed by: PHYSICIAN ASSISTANT

## 2019-06-26 PROCEDURE — 25000128 H RX IP 250 OP 636: Mod: ZF | Performed by: PHYSICIAN ASSISTANT

## 2019-06-26 PROCEDURE — 80048 BASIC METABOLIC PNL TOTAL CA: CPT | Performed by: PHYSICIAN ASSISTANT

## 2019-06-26 RX ORDER — HEPARIN SODIUM,PORCINE 10 UNIT/ML
5 VIAL (ML) INTRAVENOUS ONCE
Status: COMPLETED | OUTPATIENT
Start: 2019-06-26 | End: 2019-06-26

## 2019-06-26 RX ADMIN — HEPARIN, PORCINE (PF) 10 UNIT/ML INTRAVENOUS SYRINGE 5 ML: at 09:34

## 2019-06-26 ASSESSMENT — PAIN SCALES - GENERAL: PAINLEVEL: NO PAIN (0)

## 2019-06-26 NOTE — PROGRESS NOTES
Infusion Nursing Note:  Angel Yanez presents today for poss transfusions.    Patient seen by provider today: No   present during visit today: Not Applicable.    Note: Patient was given platelets today for a count of 17K (parameter keep >20K).  No pre meds needed/given.      Intravenous Access:  Hsieh.    Treatment Conditions:  Lab Results   Component Value Date    HGB 8.3 06/26/2019     Lab Results   Component Value Date    WBC 2.8 06/26/2019      Lab Results   Component Value Date    ANEU 1.2 06/26/2019     Lab Results   Component Value Date    PLT 17 06/26/2019      Lab Results   Component Value Date     06/26/2019                   Lab Results   Component Value Date    POTASSIUM 3.8 06/26/2019           Lab Results   Component Value Date    MAG 2.1 06/24/2019            Lab Results   Component Value Date    CR 0.84 06/26/2019                   Lab Results   Component Value Date    ZHEN 8.6 06/26/2019                Lab Results   Component Value Date    BILITOTAL 0.4 06/24/2019           Lab Results   Component Value Date    ALBUMIN 3.6 06/24/2019                    Lab Results   Component Value Date    ALT 16 06/24/2019           Lab Results   Component Value Date    AST 19 06/24/2019           Post Infusion Assessment:  Patient tolerated infusion without incident.       Discharge Plan:   AVS to patient via [facility].  Patient will return tomorrow for next appointment.   Patient discharged in stable condition accompanied by: self.  Departure Mode: Ambulatory.    CARLO CASTRO RN

## 2019-06-26 NOTE — NURSING NOTE
"Oncology Rooming Note    June 26, 2019 11:51 AM   Angel Yanez is a 59 year old male who presents for:    Chief Complaint   Patient presents with     Blood Draw     Labs drawn via CVC by RN in lab. VS taken. Pt checked in for next appt     Infusion     Plt infusion, labs, med review, Multiple Myeloma     Initial Vitals: /69   Pulse 104   Temp 98.4  F (36.9  C) (Oral)   Resp 16   Wt 79.7 kg (175 lb 12.8 oz)   SpO2 100%   BMI 25.95 kg/m   Estimated body mass index is 25.95 kg/m  as calculated from the following:    Height as of 6/24/19: 1.753 m (5' 9.02\").    Weight as of this encounter: 79.7 kg (175 lb 12.8 oz). Body surface area is 1.97 meters squared.  No Pain (0) Comment: Data Unavailable   No LMP for male patient.  Allergies reviewed: Yes  Medications reviewed: Yes    Medications: Medication refills not needed today.  Pharmacy name entered into EPIC:    [facility] - [place], [place] - [address] [facility] [address] - [place], [place] - [address] [facility]    Clinical concerns: Patient reports feeling well and offers no complaints.   provider was NOT notified.      CARLO CASTRO RN              "

## 2019-06-27 ENCOUNTER — ONCOLOGY VISIT (OUTPATIENT)
Dept: TRANSPLANT | Facility: CLINIC | Age: 59
End: 2019-06-27
Attending: PHYSICIAN ASSISTANT
Payer: COMMERCIAL

## 2019-06-27 ENCOUNTER — APPOINTMENT (OUTPATIENT)
Dept: LAB | Facility: CLINIC | Age: 59
End: 2019-06-27
Attending: PHYSICIAN ASSISTANT
Payer: COMMERCIAL

## 2019-06-27 VITALS
HEART RATE: 97 BPM | WEIGHT: 175 LBS | DIASTOLIC BLOOD PRESSURE: 76 MMHG | BODY MASS INDEX: 25.83 KG/M2 | TEMPERATURE: 98.6 F | SYSTOLIC BLOOD PRESSURE: 111 MMHG | RESPIRATION RATE: 18 BRPM | OXYGEN SATURATION: 98 %

## 2019-06-27 DIAGNOSIS — C90.01 MULTIPLE MYELOMA IN REMISSION (H): Primary | ICD-10-CM

## 2019-06-27 LAB
ABO + RH BLD: ABNORMAL
ABO + RH BLD: ABNORMAL
ANION GAP SERPL CALCULATED.3IONS-SCNC: 7 MMOL/L (ref 3–14)
BASOPHILS # BLD AUTO: 0 10E9/L (ref 0–0.2)
BASOPHILS NFR BLD AUTO: 0 %
BLD GP AB SCN SERPL QL: ABNORMAL
BLD PROD TYP BPU: ABNORMAL
BLD PROD TYP BPU: NORMAL
BLD UNIT ID BPU: 0
BLOOD BANK CMNT PATIENT-IMP: ABNORMAL
BLOOD BANK CMNT PATIENT-IMP: ABNORMAL
BLOOD PRODUCT CODE: NORMAL
BPU ID: NORMAL
BUN SERPL-MCNC: 17 MG/DL (ref 7–30)
CALCIUM SERPL-MCNC: 8.8 MG/DL (ref 8.5–10.1)
CHLORIDE SERPL-SCNC: 108 MMOL/L (ref 94–109)
CO2 SERPL-SCNC: 24 MMOL/L (ref 20–32)
CREAT SERPL-MCNC: 0.85 MG/DL (ref 0.66–1.25)
DIFFERENTIAL METHOD BLD: ABNORMAL
EOSINOPHIL # BLD AUTO: 0 10E9/L (ref 0–0.7)
EOSINOPHIL NFR BLD AUTO: 0.4 %
ERYTHROCYTE [DISTWIDTH] IN BLOOD BY AUTOMATED COUNT: 13.1 % (ref 10–15)
GFR SERPL CREATININE-BSD FRML MDRD: >90 ML/MIN/{1.73_M2}
GLUCOSE SERPL-MCNC: 109 MG/DL (ref 70–99)
HCT VFR BLD AUTO: 24.3 % (ref 40–53)
HGB BLD-MCNC: 8.4 G/DL (ref 13.3–17.7)
IMM GRANULOCYTES # BLD: 0 10E9/L (ref 0–0.4)
IMM GRANULOCYTES NFR BLD: 0 %
LYMPHOCYTES # BLD AUTO: 1 10E9/L (ref 0.8–5.3)
LYMPHOCYTES NFR BLD AUTO: 46.2 %
MCH RBC QN AUTO: 31.1 PG (ref 26.5–33)
MCHC RBC AUTO-ENTMCNC: 34.6 G/DL (ref 31.5–36.5)
MCV RBC AUTO: 90 FL (ref 78–100)
MONOCYTES # BLD AUTO: 0.5 10E9/L (ref 0–1.3)
MONOCYTES NFR BLD AUTO: 20.6 %
NEUTROPHILS # BLD AUTO: 0.7 10E9/L (ref 1.6–8.3)
NEUTROPHILS NFR BLD AUTO: 32.8 %
NRBC # BLD AUTO: 0 10*3/UL
NRBC BLD AUTO-RTO: 0 /100
NUM BPU REQUESTED: 2
PLATELET # BLD AUTO: 35 10E9/L (ref 150–450)
PLATELET # BLD EST: ABNORMAL 10*3/UL
POTASSIUM SERPL-SCNC: 3.9 MMOL/L (ref 3.4–5.3)
RBC # BLD AUTO: 2.7 10E12/L (ref 4.4–5.9)
SODIUM SERPL-SCNC: 139 MMOL/L (ref 133–144)
SPECIMEN EXP DATE BLD: ABNORMAL
TRANSFUSION STATUS PATIENT QL: NORMAL
TRANSFUSION STATUS PATIENT QL: NORMAL
WBC # BLD AUTO: 2.2 10E9/L (ref 4–11)

## 2019-06-27 PROCEDURE — 86850 RBC ANTIBODY SCREEN: CPT | Performed by: PHYSICIAN ASSISTANT

## 2019-06-27 PROCEDURE — 25000128 H RX IP 250 OP 636: Mod: ZF | Performed by: PHYSICIAN ASSISTANT

## 2019-06-27 PROCEDURE — 86901 BLOOD TYPING SEROLOGIC RH(D): CPT | Performed by: PHYSICIAN ASSISTANT

## 2019-06-27 PROCEDURE — 40000268 ZZH STATISTIC NO CHARGES: Mod: ZF

## 2019-06-27 PROCEDURE — G0463 HOSPITAL OUTPT CLINIC VISIT: HCPCS | Mod: 25

## 2019-06-27 PROCEDURE — 80048 BASIC METABOLIC PNL TOTAL CA: CPT | Performed by: PHYSICIAN ASSISTANT

## 2019-06-27 PROCEDURE — 85025 COMPLETE CBC W/AUTO DIFF WBC: CPT | Performed by: PHYSICIAN ASSISTANT

## 2019-06-27 PROCEDURE — 86900 BLOOD TYPING SEROLOGIC ABO: CPT | Performed by: PHYSICIAN ASSISTANT

## 2019-06-27 PROCEDURE — 86902 BLOOD TYPE ANTIGEN DONOR EA: CPT | Performed by: PHYSICIAN ASSISTANT

## 2019-06-27 PROCEDURE — 96372 THER/PROPH/DIAG INJ SC/IM: CPT

## 2019-06-27 RX ORDER — HEPARIN SODIUM,PORCINE 10 UNIT/ML
5 VIAL (ML) INTRAVENOUS
Status: DISCONTINUED | OUTPATIENT
Start: 2019-06-27 | End: 2019-06-27 | Stop reason: HOSPADM

## 2019-06-27 RX ORDER — HEPARIN SODIUM,PORCINE 10 UNIT/ML
5 VIAL (ML) INTRAVENOUS
Status: CANCELLED | OUTPATIENT
Start: 2019-06-27

## 2019-06-27 RX ADMIN — HEPARIN, PORCINE (PF) 10 UNIT/ML INTRAVENOUS SYRINGE 5 ML: at 08:27

## 2019-06-27 RX ADMIN — FILGRASTIM 480 MCG: 480 INJECTION, SOLUTION INTRAVENOUS; SUBCUTANEOUS at 09:18

## 2019-06-27 ASSESSMENT — PAIN SCALES - GENERAL: PAINLEVEL: NO PAIN (0)

## 2019-06-27 NOTE — PROGRESS NOTES
Chief Complaint   Patient presents with     Infusion     possible transfusion s/p bmt txp for multiple myeloma     Labs were monitored and no transfusion/infusion needed.

## 2019-06-27 NOTE — NURSING NOTE
"Oncology Rooming Note    June 27, 2019 8:47 AM   Angel Yanez is a 59 year old male who presents for:    Chief Complaint   Patient presents with     Blood Draw     Labs drawn via CVC by RN in lab. Line flushed with saline and heparin. VS taken.      RECHECK     RTN- MM     Initial Vitals: /76 (BP Location: Right arm, Patient Position: Sitting, Cuff Size: Adult Regular)   Pulse 97   Temp 98.6  F (37  C) (Oral)   Resp 18   Wt 79.4 kg (175 lb)   SpO2 98%   BMI 25.83 kg/m   Estimated body mass index is 25.83 kg/m  as calculated from the following:    Height as of 6/24/19: 1.753 m (5' 9.02\").    Weight as of this encounter: 79.4 kg (175 lb). Body surface area is 1.97 meters squared.  No Pain (0) Comment: Data Unavailable   No LMP for male patient.  Allergies reviewed: Yes  Medications reviewed: Yes    Medications: Medication refills not needed today.  Pharmacy name entered into EPIC:    [facility] - [place], [place] - [address]  [facility] - [place], [place] - [address] [facility]    Clinical concerns: None       Kathy Perez CMA              "

## 2019-06-27 NOTE — PROGRESS NOTES
BMT Clinic  Note     Patient ID:  Angel Yanez is a 58 yo man Day +41 s/p 2nd auto for IgB kappa MM .      Diagnosis MM Multiple myeloma  HCT Type Autologous    Prep Regimen Cytoxan  Melphalan   Primary BMT MD Dr. Lucio       INTERVAL  HISTORY   Feeling well. Is slightly bummed about how long his counts are taking to stabilize. No new medical complaints. Eating well without n/v/d/c, foods still taste poorly. Cough is slowly improving and remains non-productive. No fevers, chills, congestion, or new infectious symptoms. No rashes or swelling     Review of Systems: 10 point ROS negative except as noted above.     PHYSICAL EXAM   Blood pressure 111/76, pulse 97, temperature 98.6  F (37  C), temperature source Oral, resp. rate 18, weight 79.4 kg (175 lb), SpO2 98 %.    Wt Readings from Last 4 Encounters:   06/27/19 79.4 kg (175 lb)   06/26/19 79.7 kg (175 lb 12.8 oz)   06/25/19 79.6 kg (175 lb 6.4 oz)   06/24/19 79.9 kg (176 lb 1.6 oz)     General: NAD, pleasant  Eyes:  sclera anicteric and not injected  Nose/Mouth/Throat: OP moist, no ulcerations   Lungs: CTAB, no increased WOB  Cardiovascular: regular rate and rhythm, no M/R/G   Abdominal/Rectal: +BS, soft, NT, ND   Lymphatics: no edema  Skin: no rash or petechaie  Neuro: non-focal, no gross deficits  Additional Findings: CVC site NT, no drainage.    Labs:   Lab Results   Component Value Date    WBC 2.2 (L) 06/27/2019    ANEU 0.7 (L) 06/27/2019    HGB 8.4 (L) 06/27/2019    HCT 24.3 (L) 06/27/2019    PLT 35 (LL) 06/27/2019     06/27/2019    POTASSIUM 3.9 06/27/2019    CHLORIDE 108 06/27/2019    CO2 24 06/27/2019     (H) 06/27/2019    BUN 17 06/27/2019    CR 0.85 06/27/2019    MAG 2.1 06/24/2019    INR 1.10 06/03/2019    BILITOTAL 0.4 06/24/2019    AST 19 06/24/2019    ALT 16 06/24/2019    ALKPHOS 93 06/24/2019    PROTTOTAL 6.8 06/24/2019    ALBUMIN 3.6 06/24/2019          ASSESSMENT/PLAN   Angel Yanez is a 58 yo man Day +41 s/p 2nd auto PBSCT for  IgG Kappa MM     1.  BMT/MM:   Slow but steady engraftment; cell dose was only 0.639x10^6. ([facility] [place] - known poor cell dose as failed chemo-mobilization 1/2019.)   - last dose GCSF 6/23.  WBC drifting down since. Will give prn dose of gcsf 6/27 for prelim ANC of 0.7.      2.  HEME: Keep Hgb>8g/dL and plts>20K (hemoptysis). Tylenol/Benadryl premeds. Consider decreasing plt parameter to 10 as pt has not had any bleeding with plts of 9 or 10.   - Last plts 6/26.   - Pt has RBC antibodies. Antibody work up completed 5/26.      3.  ID: Afebrile.    +parainfluenza 3 (5/30). Ongoing cough, no hemoptysis.  Cough drops, tessalon pearls prn.   - prophy with ACV, Fluconazole, Pentamidine (6/17). Stop prophy azithro 6/24 as ANC is recovered. If still low tomorrow consider resuming bacterial prophy.   - CMV neg 6/17.  On CMV vaccine trial     4.  GI:  No complaints.  - GI prophy- omeprazole.       5.  FEN/Renal: Creat, lytes wnl. Eating well.      6.  Mood:  Cont Paxil     RTC: tomorrow for labs, provider visit to assess response to gcsf and possible RBCs. If counts stable could possibly have the weekend off.     Fei Meng PA-C  [phone]

## 2019-06-27 NOTE — PROGRESS NOTES
BMT Clinic  Note     Patient ID:  Angel Yanez is a 60 yo man Day +42 s/p 2nd auto for IgB kappa MM .      Diagnosis MM Multiple myeloma  HCT Type Autologous    Prep Regimen Cytoxan  Melphalan   Primary BMT MD Dr. Lucio       INTERVAL  HISTORY   Feeling well. Was a little light headed last night, but feeling fine this morning.  Eating with no N/V/D.  Afebrile with chronic cough.  No pain, bleeding or rash.  He is concerned that his counts are taking so long to recover    Review of Systems: 10 point ROS negative except as noted above.     PHYSICAL EXAM   Blood pressure 113/74, pulse 110, temperature 98.2  F (36.8  C), temperature source Oral, weight 78.7 kg (173 lb 8 oz), SpO2 98 %.    Wt Readings from Last 4 Encounters:   06/28/19 78.7 kg (173 lb 8 oz)   06/27/19 79.4 kg (175 lb)   06/26/19 79.7 kg (175 lb 12.8 oz)   06/25/19 79.6 kg (175 lb 6.4 oz)     General: NAD, pleasant  Eyes:  sclera anicteric and not injected  Nose/Mouth/Throat: OP moist, no ulcerations   Lungs: CTAB, no increased WOB  Cardiovascular: regular rate and rhythm, no M/R/G   Abdominal/Rectal: +BS, soft, NT, ND   Lymphatics: no edema  Skin: no rash or petechaie  Neuro: non-focal, no gross deficits  Additional Findings: CVC site NT, no drainage.    Labs:   Lab Results   Component Value Date    WBC 7.8 06/28/2019    ANEU 0.7 (L) 06/27/2019    HGB 8.4 (L) 06/28/2019    HCT 24.9 (L) 06/28/2019    PLT 28 (LL) 06/28/2019     06/28/2019    POTASSIUM 3.9 06/28/2019    CHLORIDE 110 (H) 06/28/2019    CO2 PENDING 06/28/2019    GLC PENDING 06/28/2019    BUN PENDING 06/28/2019    CR PENDING 06/28/2019    MAG 2.1 06/24/2019    INR 1.10 06/03/2019    BILITOTAL 0.4 06/24/2019    AST 19 06/24/2019    ALT 16 06/24/2019    ALKPHOS 93 06/24/2019    PROTTOTAL 6.8 06/24/2019    ALBUMIN 3.6 06/24/2019          ASSESSMENT/PLAN   Angel Yanez is a 60 yo man Day +42 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  BMT/MM:   Slow but steady engraftment; cell dose  was only 0.639x10^6. ([facility] [place] - known poor cell dose as failed chemo-mobilization 1/2019.)   - last dose GCSF 6/23.  White count down so was given a single dose on 6/27 with good response.  No GCSF today      2.  HEME: Keep Hgb>8g/dL  - Will give plt today so he can have the weekend off then decrease plt parameter to 10 (was 20 due to hx of hemoptysis)  - Pt has RBC antibodies. Antibody work up completed 5/26.      3.  ID: Afebrile.    - +parainfluenza 3 (5/30). Ongoing cough, no hemoptysis.  Cough drops, tessalon pearls prn.   - prophy with ACV, Fluconazole, Pentamidine (6/17). Stopped prophy azithro 6/24 as ANC is recovered.   - CMV neg 6/17.  On CMV vaccine trial     4.  GI:  No complaints.  - GI prophy- omeprazole.       5.  FEN/Renal: Creat, lytes wnl. Eating well.      6.  Mood:  Cont Paxil       RTC Monday    Karla Manzo

## 2019-06-27 NOTE — NURSING NOTE
Pt was given his 480 mcg GCSF via subcutaneous in his right arm. Pt tolerated this well.    Shoshana Salazar MA

## 2019-06-28 ENCOUNTER — INFUSION THERAPY VISIT (OUTPATIENT)
Dept: TRANSPLANT | Facility: CLINIC | Age: 59
End: 2019-06-28
Attending: NURSE PRACTITIONER
Payer: COMMERCIAL

## 2019-06-28 ENCOUNTER — APPOINTMENT (OUTPATIENT)
Dept: LAB | Facility: CLINIC | Age: 59
End: 2019-06-28
Attending: NURSE PRACTITIONER
Payer: COMMERCIAL

## 2019-06-28 ENCOUNTER — ONCOLOGY VISIT (OUTPATIENT)
Dept: TRANSPLANT | Facility: CLINIC | Age: 59
End: 2019-06-28
Attending: PHYSICIAN ASSISTANT
Payer: COMMERCIAL

## 2019-06-28 VITALS
SYSTOLIC BLOOD PRESSURE: 127 MMHG | OXYGEN SATURATION: 100 % | TEMPERATURE: 98.6 F | RESPIRATION RATE: 18 BRPM | HEART RATE: 82 BPM | DIASTOLIC BLOOD PRESSURE: 83 MMHG

## 2019-06-28 VITALS
DIASTOLIC BLOOD PRESSURE: 74 MMHG | HEART RATE: 110 BPM | OXYGEN SATURATION: 98 % | SYSTOLIC BLOOD PRESSURE: 113 MMHG | TEMPERATURE: 98.2 F | BODY MASS INDEX: 25.61 KG/M2 | WEIGHT: 173.5 LBS

## 2019-06-28 DIAGNOSIS — C90.01 MULTIPLE MYELOMA IN REMISSION (H): Primary | ICD-10-CM

## 2019-06-28 LAB
ANION GAP SERPL CALCULATED.3IONS-SCNC: 6 MMOL/L (ref 3–14)
BASOPHILS # BLD AUTO: 0 10E9/L (ref 0–0.2)
BASOPHILS NFR BLD AUTO: 0 %
BLD PROD TYP BPU: NORMAL
BLD UNIT ID BPU: 0
BLOOD BANK CMNT PATIENT-IMP: NORMAL
BLOOD PRODUCT CODE: NORMAL
BPU ID: NORMAL
BUN SERPL-MCNC: 18 MG/DL (ref 7–30)
CALCIUM SERPL-MCNC: 8.9 MG/DL (ref 8.5–10.1)
CHLORIDE SERPL-SCNC: 110 MMOL/L (ref 94–109)
CO2 SERPL-SCNC: 23 MMOL/L (ref 20–32)
CREAT SERPL-MCNC: 0.89 MG/DL (ref 0.66–1.25)
DIFFERENTIAL METHOD BLD: ABNORMAL
EOSINOPHIL # BLD AUTO: 0 10E9/L (ref 0–0.7)
EOSINOPHIL NFR BLD AUTO: 0 %
ERYTHROCYTE [DISTWIDTH] IN BLOOD BY AUTOMATED COUNT: 13.2 % (ref 10–15)
GFR SERPL CREATININE-BSD FRML MDRD: >90 ML/MIN/{1.73_M2}
GLUCOSE SERPL-MCNC: 113 MG/DL (ref 70–99)
HCT VFR BLD AUTO: 24.9 % (ref 40–53)
HGB BLD-MCNC: 8.4 G/DL (ref 13.3–17.7)
LYMPHOCYTES # BLD AUTO: 1.3 10E9/L (ref 0.8–5.3)
LYMPHOCYTES NFR BLD AUTO: 16.7 %
MCH RBC QN AUTO: 30.8 PG (ref 26.5–33)
MCHC RBC AUTO-ENTMCNC: 33.7 G/DL (ref 31.5–36.5)
MCV RBC AUTO: 91 FL (ref 78–100)
METAMYELOCYTES # BLD: 0.1 10E9/L
METAMYELOCYTES NFR BLD MANUAL: 0.9 %
MONOCYTES # BLD AUTO: 0.5 10E9/L (ref 0–1.3)
MONOCYTES NFR BLD AUTO: 7 %
MYELOCYTES # BLD: 0.1 10E9/L
MYELOCYTES NFR BLD MANUAL: 0.9 %
NEUTROPHILS # BLD AUTO: 5.8 10E9/L (ref 1.6–8.3)
NEUTROPHILS NFR BLD AUTO: 74.5 %
OVALOCYTES BLD QL SMEAR: SLIGHT
PLATELET # BLD AUTO: 28 10E9/L (ref 150–450)
PLATELET # BLD EST: ABNORMAL 10*3/UL
POIKILOCYTOSIS BLD QL SMEAR: SLIGHT
POTASSIUM SERPL-SCNC: 3.9 MMOL/L (ref 3.4–5.3)
RBC # BLD AUTO: 2.73 10E12/L (ref 4.4–5.9)
SODIUM SERPL-SCNC: 139 MMOL/L (ref 133–144)
TRANSFUSION STATUS PATIENT QL: NORMAL
WBC # BLD AUTO: 7.8 10E9/L (ref 4–11)

## 2019-06-28 PROCEDURE — 86900 BLOOD TYPING SEROLOGIC ABO: CPT | Performed by: NURSE PRACTITIONER

## 2019-06-28 PROCEDURE — 80048 BASIC METABOLIC PNL TOTAL CA: CPT | Performed by: PHYSICIAN ASSISTANT

## 2019-06-28 PROCEDURE — 86850 RBC ANTIBODY SCREEN: CPT | Performed by: NURSE PRACTITIONER

## 2019-06-28 PROCEDURE — G0463 HOSPITAL OUTPT CLINIC VISIT: HCPCS | Mod: 25

## 2019-06-28 PROCEDURE — 85025 COMPLETE CBC W/AUTO DIFF WBC: CPT | Performed by: PHYSICIAN ASSISTANT

## 2019-06-28 PROCEDURE — 25000128 H RX IP 250 OP 636: Mod: ZF | Performed by: NURSE PRACTITIONER

## 2019-06-28 PROCEDURE — P9037 PLATE PHERES LEUKOREDU IRRAD: HCPCS | Performed by: PHYSICIAN ASSISTANT

## 2019-06-28 PROCEDURE — 86901 BLOOD TYPING SEROLOGIC RH(D): CPT | Performed by: NURSE PRACTITIONER

## 2019-06-28 PROCEDURE — 36430 TRANSFUSION BLD/BLD COMPNT: CPT

## 2019-06-28 PROCEDURE — 86902 BLOOD TYPE ANTIGEN DONOR EA: CPT | Performed by: NURSE PRACTITIONER

## 2019-06-28 RX ORDER — HEPARIN SODIUM,PORCINE 10 UNIT/ML
5 VIAL (ML) INTRAVENOUS
Status: DISCONTINUED | OUTPATIENT
Start: 2019-06-28 | End: 2019-06-28 | Stop reason: HOSPADM

## 2019-06-28 RX ADMIN — HEPARIN, PORCINE (PF) 10 UNIT/ML INTRAVENOUS SYRINGE 5 ML: at 08:07

## 2019-06-28 RX ADMIN — HEPARIN, PORCINE (PF) 10 UNIT/ML INTRAVENOUS SYRINGE 5 ML: at 08:06

## 2019-06-28 ASSESSMENT — PAIN SCALES - GENERAL: PAINLEVEL: NO PAIN (0)

## 2019-06-28 NOTE — NURSING NOTE
"Oncology Rooming Note    June 28, 2019 8:18 AM   Angel Yanez is a 59 year old male who presents for:    Chief Complaint   Patient presents with     Lab Only     labs drawn via cvc by RN in lab, saline and heparin locked, vitals completed     Initial Vitals: /74   Pulse 110   Temp 98.2  F (36.8  C) (Oral)   Wt 78.7 kg (173 lb 8 oz)   SpO2 98%   BMI 25.61 kg/m   Estimated body mass index is 25.61 kg/m  as calculated from the following:    Height as of 6/24/19: 1.753 m (5' 9.02\").    Weight as of this encounter: 78.7 kg (173 lb 8 oz). Body surface area is 1.96 meters squared.  No Pain (0) Comment: Data Unavailable   No LMP for male patient.  Allergies reviewed: Yes  Medications reviewed: Yes    Medications: Medication refills not needed today.  Pharmacy name entered into EPIC:    [facility] - [place], [place] - [address] [facility] [address] - [place], [place] - [address]    Clinical concerns:  none    Vickie Harris CMA              "

## 2019-06-28 NOTE — PROGRESS NOTES
Infusion Nursing Note:  Angel Yanez presents today for one dose of Plts.    Patient seen by provider today: Yes: Karla Manzo NP   present during visit today: Not Applicable.    Note: Pt received one dose of Plts for a Plt count of 28K today. No Premeds required. Pt tolerated transfusion well. VSS throughout. See Doc Flowsheet for further details.     Intravenous Access:  Hsieh.    Treatment Conditions:  Lab Results   Component Value Date    HGB 8.4 06/28/2019     Lab Results   Component Value Date    WBC 7.8 06/28/2019      Lab Results   Component Value Date    ANEU 5.8 06/28/2019     Lab Results   Component Value Date    PLT 28 06/28/2019          Post Infusion Assessment:  Patient tolerated infusion without incident.       Discharge Plan:   Patient discharged in stable condition accompanied by: self.  Departure Mode: Ambulatory.    Radha Coker RN

## 2019-06-28 NOTE — NURSING NOTE
Chief Complaint   Patient presents with     RECHECK     Pt here for one dose of Plts. Pt is s/p BMT for Multiple Myeloma.      Radha Coker RN

## 2019-06-30 LAB
BLD PROD TYP BPU: NORMAL
BLD PROD TYP BPU: NORMAL
BLD UNIT ID BPU: 0
BLD UNIT ID BPU: 0
BLOOD PRODUCT CODE: NORMAL
BLOOD PRODUCT CODE: NORMAL
BPU ID: NORMAL
BPU ID: NORMAL
TRANSFUSION STATUS PATIENT QL: NORMAL

## 2019-07-01 ENCOUNTER — ONCOLOGY VISIT (OUTPATIENT)
Dept: TRANSPLANT | Facility: CLINIC | Age: 59
End: 2019-07-01
Attending: PHYSICIAN ASSISTANT
Payer: COMMERCIAL

## 2019-07-01 ENCOUNTER — APPOINTMENT (OUTPATIENT)
Dept: LAB | Facility: CLINIC | Age: 59
End: 2019-07-01
Attending: PHYSICIAN ASSISTANT
Payer: COMMERCIAL

## 2019-07-01 VITALS
SYSTOLIC BLOOD PRESSURE: 115 MMHG | TEMPERATURE: 98.5 F | OXYGEN SATURATION: 100 % | HEART RATE: 110 BPM | WEIGHT: 174.5 LBS | DIASTOLIC BLOOD PRESSURE: 81 MMHG | BODY MASS INDEX: 25.75 KG/M2 | RESPIRATION RATE: 16 BRPM

## 2019-07-01 DIAGNOSIS — C90.01 MULTIPLE MYELOMA IN REMISSION (H): Primary | ICD-10-CM

## 2019-07-01 LAB
ABO + RH BLD: ABNORMAL
ABO + RH BLD: ABNORMAL
ALBUMIN SERPL-MCNC: 3.6 G/DL (ref 3.4–5)
ALP SERPL-CCNC: 93 U/L (ref 40–150)
ALT SERPL W P-5'-P-CCNC: 18 U/L (ref 0–70)
ANION GAP SERPL CALCULATED.3IONS-SCNC: 7 MMOL/L (ref 3–14)
AST SERPL W P-5'-P-CCNC: 18 U/L (ref 0–45)
BASOPHILS # BLD AUTO: 0 10E9/L (ref 0–0.2)
BASOPHILS NFR BLD AUTO: 0 %
BILIRUB SERPL-MCNC: 0.4 MG/DL (ref 0.2–1.3)
BLD GP AB SCN SERPL QL: ABNORMAL
BLD PROD TYP BPU: ABNORMAL
BLD PROD TYP BPU: NORMAL
BLD UNIT ID BPU: 0
BLOOD BANK CMNT PATIENT-IMP: ABNORMAL
BLOOD BANK CMNT PATIENT-IMP: ABNORMAL
BLOOD PRODUCT CODE: NORMAL
BPU ID: NORMAL
BUN SERPL-MCNC: 14 MG/DL (ref 7–30)
BURR CELLS BLD QL SMEAR: SLIGHT
CALCIUM SERPL-MCNC: 8.9 MG/DL (ref 8.5–10.1)
CHLORIDE SERPL-SCNC: 107 MMOL/L (ref 94–109)
CO2 SERPL-SCNC: 24 MMOL/L (ref 20–32)
CREAT SERPL-MCNC: 0.87 MG/DL (ref 0.66–1.25)
DACRYOCYTES BLD QL SMEAR: SLIGHT
DIFFERENTIAL METHOD BLD: ABNORMAL
EOSINOPHIL # BLD AUTO: 0 10E9/L (ref 0–0.7)
EOSINOPHIL NFR BLD AUTO: 0 %
ERYTHROCYTE [DISTWIDTH] IN BLOOD BY AUTOMATED COUNT: 13.7 % (ref 10–15)
GFR SERPL CREATININE-BSD FRML MDRD: >90 ML/MIN/{1.73_M2}
GLUCOSE SERPL-MCNC: 96 MG/DL (ref 70–99)
HCT VFR BLD AUTO: 22.8 % (ref 40–53)
HGB BLD-MCNC: 7.8 G/DL (ref 13.3–17.7)
LYMPHOCYTES # BLD AUTO: 1.3 10E9/L (ref 0.8–5.3)
LYMPHOCYTES NFR BLD AUTO: 44.7 %
MAGNESIUM SERPL-MCNC: 2 MG/DL (ref 1.6–2.3)
MCH RBC QN AUTO: 31.2 PG (ref 26.5–33)
MCHC RBC AUTO-ENTMCNC: 34.2 G/DL (ref 31.5–36.5)
MCV RBC AUTO: 91 FL (ref 78–100)
MONOCYTES # BLD AUTO: 0.3 10E9/L (ref 0–1.3)
MONOCYTES NFR BLD AUTO: 9.7 %
NEUTROPHILS # BLD AUTO: 1.4 10E9/L (ref 1.6–8.3)
NEUTROPHILS NFR BLD AUTO: 45.6 %
NUM BPU REQUESTED: 2
OVALOCYTES BLD QL SMEAR: SLIGHT
PLATELET # BLD AUTO: 33 10E9/L (ref 150–450)
PLATELET # BLD EST: ABNORMAL 10*3/UL
POIKILOCYTOSIS BLD QL SMEAR: SLIGHT
POTASSIUM SERPL-SCNC: 4 MMOL/L (ref 3.4–5.3)
PROT SERPL-MCNC: 6.8 G/DL (ref 6.8–8.8)
RBC # BLD AUTO: 2.5 10E12/L (ref 4.4–5.9)
SODIUM SERPL-SCNC: 138 MMOL/L (ref 133–144)
SPECIMEN EXP DATE BLD: ABNORMAL
TRANSFUSION STATUS PATIENT QL: NORMAL
WBC # BLD AUTO: 3 10E9/L (ref 4–11)

## 2019-07-01 PROCEDURE — 86901 BLOOD TYPING SEROLOGIC RH(D): CPT | Performed by: NURSE PRACTITIONER

## 2019-07-01 PROCEDURE — 25000128 H RX IP 250 OP 636: Mod: ZF | Performed by: PHYSICIAN ASSISTANT

## 2019-07-01 PROCEDURE — 83735 ASSAY OF MAGNESIUM: CPT | Performed by: NURSE PRACTITIONER

## 2019-07-01 PROCEDURE — 86850 RBC ANTIBODY SCREEN: CPT | Performed by: NURSE PRACTITIONER

## 2019-07-01 PROCEDURE — 86900 BLOOD TYPING SEROLOGIC ABO: CPT | Performed by: NURSE PRACTITIONER

## 2019-07-01 PROCEDURE — 80053 COMPREHEN METABOLIC PANEL: CPT | Performed by: NURSE PRACTITIONER

## 2019-07-01 PROCEDURE — 36592 COLLECT BLOOD FROM PICC: CPT

## 2019-07-01 PROCEDURE — G0463 HOSPITAL OUTPT CLINIC VISIT: HCPCS | Mod: ZF

## 2019-07-01 PROCEDURE — 85025 COMPLETE CBC W/AUTO DIFF WBC: CPT | Performed by: NURSE PRACTITIONER

## 2019-07-01 PROCEDURE — 86902 BLOOD TYPE ANTIGEN DONOR EA: CPT | Performed by: NURSE PRACTITIONER

## 2019-07-01 RX ORDER — HEPARIN SODIUM,PORCINE 10 UNIT/ML
5 VIAL (ML) INTRAVENOUS
Status: DISCONTINUED | OUTPATIENT
Start: 2019-07-01 | End: 2019-07-03 | Stop reason: HOSPADM

## 2019-07-01 RX ORDER — HEPARIN SODIUM,PORCINE 10 UNIT/ML
5 VIAL (ML) INTRAVENOUS
Status: CANCELLED | OUTPATIENT
Start: 2019-07-01

## 2019-07-01 RX ADMIN — HEPARIN, PORCINE (PF) 10 UNIT/ML INTRAVENOUS SYRINGE 5 ML: at 08:47

## 2019-07-01 RX ADMIN — HEPARIN, PORCINE (PF) 10 UNIT/ML INTRAVENOUS SYRINGE 5 ML: at 08:48

## 2019-07-01 ASSESSMENT — PAIN SCALES - GENERAL: PAINLEVEL: NO PAIN (0)

## 2019-07-01 NOTE — NURSING NOTE
Chief Complaint   Patient presents with     Blood Draw     VS done, labs collected via CVC by lab RN, caps changed and heparin locked x 2.  Hx MM s/p transplant.

## 2019-07-01 NOTE — PROGRESS NOTES
BMT Clinic  Note     Patient ID:  Angel Yanez is a 58 yo man Day +45 s/p 2nd auto for IgB kappa MM .      Diagnosis MM Multiple myeloma  HCT Type Autologous    Prep Regimen Cytoxan  Melphalan   Primary BMT MD Dr. Lucio       INTERVAL  HISTORY   Feeling well.  Eating with no N/V/D.  Afebrile with chronic cough.  No pain, bleeding or rash.      Review of Systems: 10 point ROS negative except as noted above.     PHYSICAL EXAM   Blood pressure 115/81, pulse 110, temperature 98.5  F (36.9  C), temperature source Oral, resp. rate 16, weight 79.2 kg (174 lb 8 oz), SpO2 100 %.    Wt Readings from Last 4 Encounters:   07/01/19 79.2 kg (174 lb 8 oz)   06/28/19 78.7 kg (173 lb 8 oz)   06/27/19 79.4 kg (175 lb)   06/26/19 79.7 kg (175 lb 12.8 oz)     General: NAD, pleasant  Eyes:  sclera anicteric and not injected  Nose/Mouth/Throat: OP moist, no ulcerations   Lungs: CTAB, no increased WOB  Cardiovascular: regular rate and rhythm, no M/R/G   Abdominal/Rectal: +BS, soft, NT, ND   Lymphatics: no edema  Skin: no rash or petechaie  Neuro: non-focal, no gross deficits  Additional Findings: CVC site NT, no drainage.    Labs:   Lab Results   Component Value Date    WBC 3.0 (L) 07/01/2019    ANEU 1.4 (L) 07/01/2019    HGB 7.8 (L) 07/01/2019    HCT 22.8 (L) 07/01/2019    PLT 33 (LL) 07/01/2019     07/01/2019    POTASSIUM 4.0 07/01/2019    CHLORIDE 107 07/01/2019    CO2 24 07/01/2019    GLC 96 07/01/2019    BUN 14 07/01/2019    CR 0.87 07/01/2019    MAG 2.0 07/01/2019    INR 1.10 06/03/2019    BILITOTAL 0.4 07/01/2019    AST 18 07/01/2019    ALT 18 07/01/2019    ALKPHOS 93 07/01/2019    PROTTOTAL 6.8 07/01/2019    ALBUMIN 3.6 07/01/2019          ASSESSMENT/PLAN   Angel Yanez is a 58 yo man Day +45 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  BMT/MM:   Slow but steady engraftment; cell dose was only 0.639x10^6. ([facility] [place] - known poor cell dose as failed chemo-mobilization 1/2019.)   - last dose GCSF 6/23.  White  count down so was given a single dose on 6/27 with good response.  No GCSF today      2.  HEME: Keep Hgb>8g/dL  - Will give plt today so he can have the weekend off then decrease plt parameter to 10 (was 20 due to hx of hemoptysis)  - Pt has RBC antibodies. Antibody work up completed 5/26.      3.  ID: Afebrile.    - +parainfluenza 3 (5/30). Ongoing cough, no hemoptysis.  Cough drops, tessalon pearls prn.   - prophy with ACV, Fluconazole, Pentamidine (6/17). Stopped prophy azithro 6/24 as ANC is recovered.   - CMV neg 6/17.  On CMV vaccine trial     4.  GI:  No complaints.  - GI prophy- omeprazole.       5.  FEN/Renal: Creat, lytes wnl. Eating well.      6.  Mood:  Cont Paxil       RTC Wednesday    Karla Manzo

## 2019-07-01 NOTE — NURSING NOTE
"Oncology Rooming Note    July 1, 2019 9:07 AM   Angel Yanez is a 59 year old male who presents for:    Chief Complaint   Patient presents with     Blood Draw     VS done, labs collected via CVC by lab RN, caps changed and heparin locked x 2.  Hx MM s/p transplant.     RECHECK     RTN- MM     Initial Vitals: /81 (BP Location: Left arm, Patient Position: Sitting, Cuff Size: Adult Regular)   Pulse 110   Temp 98.5  F (36.9  C) (Oral)   Resp 16   Wt 79.2 kg (174 lb 8 oz)   SpO2 100%   BMI 25.75 kg/m   Estimated body mass index is 25.75 kg/m  as calculated from the following:    Height as of 6/24/19: 1.753 m (5' 9.02\").    Weight as of this encounter: 79.2 kg (174 lb 8 oz). Body surface area is 1.96 meters squared.  No Pain (0) Comment: Data Unavailable   No LMP for male patient.  Allergies reviewed: Yes  Medications reviewed: Yes    Medications: Medication refills not needed today.  Pharmacy name entered into EPIC:    [facility] - [place], [place] - [address]  [facility] - [place], [place] - [address]    Clinical concerns: None       Kathy Perez CMA              "

## 2019-07-02 LAB
CMV DNA SPEC NAA+PROBE-ACNC: NORMAL [IU]/ML
CMV DNA SPEC NAA+PROBE-LOG#: NORMAL {LOG_IU}/ML
SPECIMEN SOURCE: NORMAL

## 2019-07-02 NOTE — PROGRESS NOTES
BMT Clinic  Note     Patient ID:  Angel Yanez is a 58 yo man Day +47 s/p 2nd auto for IgB kappa MM .      Diagnosis MM Multiple myeloma  HCT Type Autologous    Prep Regimen Cytoxan  Melphalan   Primary BMT MD Dr. Lucio       INTERVAL  HISTORY   Feeling tired, but OK.  Eating with no N/V/D.  Afebrile with chronic cough.  No pain, bleeding or rash.  He is concerned that his counts are taking so long to recover    Review of Systems: 10 point ROS negative except as noted above.     PHYSICAL EXAM   Blood pressure 111/71, pulse 108, temperature 98.6  F (37  C), temperature source Oral, resp. rate 16, weight 79.7 kg (175 lb 11.2 oz), SpO2 100 %.    Wt Readings from Last 4 Encounters:   07/03/19 79.7 kg (175 lb 11.2 oz)   07/01/19 79.2 kg (174 lb 8 oz)   06/28/19 78.7 kg (173 lb 8 oz)   06/27/19 79.4 kg (175 lb)     General: NAD, pleasant  Eyes:  sclera anicteric and not injected  Nose/Mouth/Throat: OP moist, no ulcerations   Lungs: CTAB, no increased WOB  Cardiovascular: regular rate and rhythm, no M/R/G   Abdominal/Rectal: +BS, soft, NT, ND   Lymphatics: no edema  Skin: no rash or petechaie  Neuro: non-focal, no gross deficits  Additional Findings: CVC site NT, no drainage.    Labs:   Lab Results   Component Value Date    WBC 2.5 (L) 07/03/2019    ANEU 1.4 (L) 07/01/2019    HGB 7.7 (L) 07/03/2019    HCT 22.3 (L) 07/03/2019    PLT 25 (LL) 07/03/2019     07/03/2019    POTASSIUM 3.7 07/03/2019    CHLORIDE 109 07/03/2019    CO2 24 07/03/2019     (H) 07/03/2019    BUN 15 07/03/2019    CR 0.92 07/03/2019    MAG 1.9 07/03/2019    INR 1.10 06/03/2019    BILITOTAL 0.4 07/01/2019    AST 18 07/01/2019    ALT 18 07/01/2019    ALKPHOS 93 07/01/2019    PROTTOTAL 6.8 07/01/2019    ALBUMIN 3.6 07/01/2019          ASSESSMENT/PLAN   Angel Yanez is a 58 yo man Day +47 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  BMT/MM:   Slow but steady engraftment; cell dose was only 0.639x10^6. ([facility] [place] - known poor cell  dose as failed chemo-mobilization 1/2019.)   - last dose GCSF 6/23.  White count down, with ANC of 700 so will give a single dose today     2.  HEME: Keep Hgb>8g/dL, plt >10.  Transfuse 2 unit(s)  RBC today  - Pt has RBC antibodies. Antibody work up completed 5/26.      3.  ID: Afebrile.    - +parainfluenza 3 (5/30). Ongoing cough, no hemoptysis.  Cough drops, tessalon pearls prn.   - prophy with ACV, Fluconazole, Pentamidine (6/17). Stopped prophy azithro 6/24  - CMV neg 6/17.  On CMV vaccine trial     4.  GI:  No complaints.  - GI prophy- omeprazole.       5.  FEN/Renal: Creat, lytes wnl. Eating well.      6.  Mood:  Cont Paxil       RTC Friday    Karla Manzo

## 2019-07-03 ENCOUNTER — INFUSION THERAPY VISIT (OUTPATIENT)
Dept: TRANSPLANT | Facility: CLINIC | Age: 59
End: 2019-07-03
Attending: NURSE PRACTITIONER
Payer: COMMERCIAL

## 2019-07-03 ENCOUNTER — APPOINTMENT (OUTPATIENT)
Dept: LAB | Facility: CLINIC | Age: 59
End: 2019-07-03
Attending: NURSE PRACTITIONER
Payer: COMMERCIAL

## 2019-07-03 VITALS
TEMPERATURE: 98.6 F | RESPIRATION RATE: 16 BRPM | HEART RATE: 108 BPM | WEIGHT: 175.7 LBS | OXYGEN SATURATION: 100 % | DIASTOLIC BLOOD PRESSURE: 71 MMHG | BODY MASS INDEX: 25.93 KG/M2 | SYSTOLIC BLOOD PRESSURE: 111 MMHG

## 2019-07-03 VITALS
TEMPERATURE: 98.6 F | DIASTOLIC BLOOD PRESSURE: 77 MMHG | OXYGEN SATURATION: 98 % | HEART RATE: 82 BPM | SYSTOLIC BLOOD PRESSURE: 117 MMHG

## 2019-07-03 DIAGNOSIS — C90.01 MULTIPLE MYELOMA IN REMISSION (H): ICD-10-CM

## 2019-07-03 DIAGNOSIS — C90.01 MULTIPLE MYELOMA IN REMISSION (H): Primary | ICD-10-CM

## 2019-07-03 LAB
ABO + RH BLD: ABNORMAL
ABO + RH BLD: ABNORMAL
ANION GAP SERPL CALCULATED.3IONS-SCNC: 7 MMOL/L (ref 3–14)
BASOPHILS # BLD AUTO: 0 10E9/L (ref 0–0.2)
BASOPHILS NFR BLD AUTO: 0.4 %
BLD GP AB SCN SERPL QL: ABNORMAL
BLD PROD TYP BPU: ABNORMAL
BLD PROD TYP BPU: NORMAL
BLD UNIT ID BPU: 0
BLOOD BANK CMNT PATIENT-IMP: ABNORMAL
BLOOD BANK CMNT PATIENT-IMP: ABNORMAL
BLOOD PRODUCT CODE: NORMAL
BPU ID: NORMAL
BUN SERPL-MCNC: 15 MG/DL (ref 7–30)
CALCIUM SERPL-MCNC: 8.8 MG/DL (ref 8.5–10.1)
CHLORIDE SERPL-SCNC: 109 MMOL/L (ref 94–109)
CO2 SERPL-SCNC: 24 MMOL/L (ref 20–32)
CREAT SERPL-MCNC: 0.92 MG/DL (ref 0.66–1.25)
DIFFERENTIAL METHOD BLD: ABNORMAL
EOSINOPHIL # BLD AUTO: 0 10E9/L (ref 0–0.7)
EOSINOPHIL NFR BLD AUTO: 0.4 %
ERYTHROCYTE [DISTWIDTH] IN BLOOD BY AUTOMATED COUNT: 14.5 % (ref 10–15)
GFR SERPL CREATININE-BSD FRML MDRD: 90 ML/MIN/{1.73_M2}
GLUCOSE SERPL-MCNC: 101 MG/DL (ref 70–99)
HCT VFR BLD AUTO: 22.3 % (ref 40–53)
HGB BLD-MCNC: 7.7 G/DL (ref 13.3–17.7)
IMM GRANULOCYTES # BLD: 0 10E9/L (ref 0–0.4)
IMM GRANULOCYTES NFR BLD: 0.4 %
LYMPHOCYTES # BLD AUTO: 1.1 10E9/L (ref 0.8–5.3)
LYMPHOCYTES NFR BLD AUTO: 45.5 %
MAGNESIUM SERPL-MCNC: 1.9 MG/DL (ref 1.6–2.3)
MCH RBC QN AUTO: 31.4 PG (ref 26.5–33)
MCHC RBC AUTO-ENTMCNC: 34.5 G/DL (ref 31.5–36.5)
MCV RBC AUTO: 91 FL (ref 78–100)
MONOCYTES # BLD AUTO: 0.6 10E9/L (ref 0–1.3)
MONOCYTES NFR BLD AUTO: 25.6 %
NEUTROPHILS # BLD AUTO: 0.7 10E9/L (ref 1.6–8.3)
NEUTROPHILS NFR BLD AUTO: 27.7 %
NRBC # BLD AUTO: 0 10*3/UL
NRBC BLD AUTO-RTO: 0 /100
NUM BPU REQUESTED: 2
PLATELET # BLD AUTO: 25 10E9/L (ref 150–450)
PLATELET # BLD EST: ABNORMAL 10*3/UL
POTASSIUM SERPL-SCNC: 3.7 MMOL/L (ref 3.4–5.3)
RBC # BLD AUTO: 2.45 10E12/L (ref 4.4–5.9)
SODIUM SERPL-SCNC: 140 MMOL/L (ref 133–144)
SPECIMEN EXP DATE BLD: ABNORMAL
TRANSFUSION STATUS PATIENT QL: NORMAL
TRANSFUSION STATUS PATIENT QL: NORMAL
WBC # BLD AUTO: 2.5 10E9/L (ref 4–11)

## 2019-07-03 PROCEDURE — 36430 TRANSFUSION BLD/BLD COMPNT: CPT

## 2019-07-03 PROCEDURE — 25000128 H RX IP 250 OP 636: Mod: ZF | Performed by: NURSE PRACTITIONER

## 2019-07-03 PROCEDURE — 25000128 H RX IP 250 OP 636: Mod: ZF | Performed by: PHYSICIAN ASSISTANT

## 2019-07-03 PROCEDURE — 80048 BASIC METABOLIC PNL TOTAL CA: CPT | Performed by: NURSE PRACTITIONER

## 2019-07-03 PROCEDURE — P9040 RBC LEUKOREDUCED IRRADIATED: HCPCS | Performed by: NURSE PRACTITIONER

## 2019-07-03 PROCEDURE — 86850 RBC ANTIBODY SCREEN: CPT | Performed by: NURSE PRACTITIONER

## 2019-07-03 PROCEDURE — 86900 BLOOD TYPING SEROLOGIC ABO: CPT | Performed by: NURSE PRACTITIONER

## 2019-07-03 PROCEDURE — 85025 COMPLETE CBC W/AUTO DIFF WBC: CPT | Performed by: NURSE PRACTITIONER

## 2019-07-03 PROCEDURE — 86902 BLOOD TYPE ANTIGEN DONOR EA: CPT | Performed by: NURSE PRACTITIONER

## 2019-07-03 PROCEDURE — 83735 ASSAY OF MAGNESIUM: CPT | Performed by: NURSE PRACTITIONER

## 2019-07-03 PROCEDURE — 96372 THER/PROPH/DIAG INJ SC/IM: CPT

## 2019-07-03 PROCEDURE — 86901 BLOOD TYPING SEROLOGIC RH(D): CPT | Performed by: NURSE PRACTITIONER

## 2019-07-03 RX ORDER — HEPARIN SODIUM,PORCINE 10 UNIT/ML
5 VIAL (ML) INTRAVENOUS
Status: CANCELLED | OUTPATIENT
Start: 2019-07-03

## 2019-07-03 RX ORDER — HEPARIN SODIUM,PORCINE 10 UNIT/ML
5 VIAL (ML) INTRAVENOUS ONCE
Status: COMPLETED | OUTPATIENT
Start: 2019-07-03 | End: 2019-07-03

## 2019-07-03 RX ADMIN — HEPARIN, PORCINE (PF) 10 UNIT/ML INTRAVENOUS SYRINGE 5 ML: at 08:31

## 2019-07-03 RX ADMIN — FILGRASTIM 480 MCG: 480 INJECTION, SOLUTION INTRAVENOUS; SUBCUTANEOUS at 10:26

## 2019-07-03 ASSESSMENT — PAIN SCALES - GENERAL: PAINLEVEL: NO PAIN (0)

## 2019-07-03 NOTE — PROGRESS NOTES
Infusion Nursing Note:  Angel Yanez presents today for RBC transfusion and growth factor.    Patient seen by provider today: Yes: Karla Manzo   present during visit today: Not Applicable.    Note: Labs monitored, VSS. Hgb 7.7, 1unit RBC transfused. Growth factor administered to right arm. Pt will be back Friday for possible blood products.     Intravenous Access:  Hsieh.    Treatment Conditions:  Results reviewed, labs MET treatment parameters, ok to proceed with treatment.      Post Infusion Assessment:  Patient tolerated infusion without incident.  Patient tolerated injection without incident.       Discharge Plan:   [facility]  Patient will return Friday for next appointment.   Patient discharged in stable condition accompanied by: self.  Departure Mode: Ambulatory.    Tr Jj RN

## 2019-07-03 NOTE — NURSING NOTE
"Oncology Rooming Note    July 3, 2019 8:50 AM   Angel Yanez is a 59 year old male who presents for:    Chief Complaint   Patient presents with     Blood Draw     Labs drawn via CVC by RN in lab. VS taken. Pt checked in for next appt     RECHECK     RTN- Multiple myeloma     Initial Vitals: /71 (BP Location: Right arm, Patient Position: Sitting, Cuff Size: Adult Regular)   Pulse 108   Temp 98.6  F (37  C) (Oral)   Resp 16   Wt 79.7 kg (175 lb 11.2 oz)   SpO2 100%   BMI 25.93 kg/m   Estimated body mass index is 25.93 kg/m  as calculated from the following:    Height as of 6/24/19: 1.753 m (5' 9.02\").    Weight as of this encounter: 79.7 kg (175 lb 11.2 oz). Body surface area is 1.97 meters squared.  No Pain (0) Comment: Data Unavailable   No LMP for male patient.  Allergies reviewed: Yes  Medications reviewed: Yes    Medications: Medication refills not needed today.  Pharmacy name entered into EPIC:    [facility] - [place], [place] - [address]  [facility] - [place], [place] - [address] [facility]    Clinical concerns:  none    Vickie Harris CMA              "

## 2019-07-04 LAB
BLD PROD TYP BPU: NORMAL
BLD UNIT ID BPU: 0
BLOOD PRODUCT CODE: NORMAL
BPU ID: NORMAL
TRANSFUSION STATUS PATIENT QL: NORMAL
TRANSFUSION STATUS PATIENT QL: NORMAL

## 2019-07-04 NOTE — PROGRESS NOTES
BMT Clinic  Note     Patient ID:  Angel Yanez is a 60 yo man Day +49 s/p 2nd auto for IgB kappa MM .      Diagnosis MM Multiple myeloma  HCT Type Autologous    Prep Regimen Cytoxan  Melphalan   Primary BMT MD Dr. Lucio       INTERVAL  HISTORY   Feeling tired, but OK.  Eating with no N/V/D.  Afebrile with chronic cough.  No pain, bleeding or rash.  He is concerned that his counts are taking so long to recover    Review of Systems: 10 point ROS negative except as noted above.     PHYSICAL EXAM   Blood pressure 111/72, pulse 100, temperature 97.8  F (36.6  C), temperature source Oral, resp. rate 16, weight 79.9 kg (176 lb 1.6 oz), SpO2 98 %.    Wt Readings from Last 4 Encounters:   07/05/19 79.9 kg (176 lb 1.6 oz)   07/03/19 79.7 kg (175 lb 11.2 oz)   07/01/19 79.2 kg (174 lb 8 oz)   06/28/19 78.7 kg (173 lb 8 oz)     General: NAD, pleasant  Eyes:  sclera anicteric and not injected  Nose/Mouth/Throat: OP moist, no ulcerations   Lungs: CTAB  Cardiovascular: regular rate and rhythm, no M/R/G   Abdominal/Rectal: +BS, soft, NT, ND   Lymphatics: no edema  Skin: no rash or petechaie  Neuro: non-focal, no gross deficits  Additional Findings: CVC site NT, no drainage.    Labs:   Lab Results   Component Value Date    WBC 7.4 07/05/2019    ANEU 4.4 07/05/2019    HGB 8.1 (L) 07/05/2019    HCT 24.4 (L) 07/05/2019    PLT 16 (LL) 07/05/2019     07/05/2019    POTASSIUM 3.7 07/05/2019    CHLORIDE 108 07/05/2019    CO2 26 07/05/2019     (H) 07/05/2019    BUN 12 07/05/2019    CR 0.92 07/05/2019    MAG 1.9 07/03/2019    INR 1.10 06/03/2019    BILITOTAL 0.4 07/01/2019    AST 18 07/01/2019    ALT 18 07/01/2019    ALKPHOS 93 07/01/2019    PROTTOTAL 6.8 07/01/2019    ALBUMIN 3.6 07/01/2019          ASSESSMENT/PLAN   Angel Yanez is a 60 yo man Day +49 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  BMT/MM:   Slow engraftment; cell dose was only 0.639x10^6. ([facility] [place] - known poor cell dose as failed chemo-mobilization  1/2019.)   - last dose GCSF 7/3 with good response     2.  HEME: Keep Hgb>8g/dL, plt >10.  Transfuse 2 unit(s)  Transfuse plt today & plan for RBC's on Monday  - Pt has RBC antibodies. Antibody work up completed 5/26.      3.  ID: Afebrile.    - +parainfluenza 3 (5/30). Ongoing cough, no hemoptysis.  Cough drops, tessalon pearls prn.   - prophy with ACV, Fluconazole, Pentamidine (6/17). Stopped prophy azithro 6/24  - CMV neg 7/1.  On CMV vaccine trial     4.  GI:  No complaints.  - GI prophy- omeprazole.       5.  FEN/Renal: Creat, lytes wnl. Eating well.      6.  Mood:  Cont Paxil       RTC Monday    Karla Manzo

## 2019-07-05 ENCOUNTER — INFUSION THERAPY VISIT (OUTPATIENT)
Dept: TRANSPLANT | Facility: CLINIC | Age: 59
End: 2019-07-05
Attending: NURSE PRACTITIONER
Payer: COMMERCIAL

## 2019-07-05 ENCOUNTER — APPOINTMENT (OUTPATIENT)
Dept: LAB | Facility: CLINIC | Age: 59
End: 2019-07-05
Attending: NURSE PRACTITIONER
Payer: COMMERCIAL

## 2019-07-05 VITALS
TEMPERATURE: 97.8 F | BODY MASS INDEX: 25.99 KG/M2 | HEART RATE: 100 BPM | OXYGEN SATURATION: 98 % | SYSTOLIC BLOOD PRESSURE: 111 MMHG | RESPIRATION RATE: 16 BRPM | DIASTOLIC BLOOD PRESSURE: 72 MMHG | WEIGHT: 176.1 LBS

## 2019-07-05 VITALS
RESPIRATION RATE: 16 BRPM | OXYGEN SATURATION: 97 % | SYSTOLIC BLOOD PRESSURE: 116 MMHG | DIASTOLIC BLOOD PRESSURE: 78 MMHG | TEMPERATURE: 98.6 F | HEART RATE: 81 BPM

## 2019-07-05 DIAGNOSIS — C90.01 MULTIPLE MYELOMA IN REMISSION (H): Primary | ICD-10-CM

## 2019-07-05 DIAGNOSIS — C90.01 MULTIPLE MYELOMA IN REMISSION (H): ICD-10-CM

## 2019-07-05 LAB
ABO + RH BLD: ABNORMAL
ABO + RH BLD: ABNORMAL
ANION GAP SERPL CALCULATED.3IONS-SCNC: 8 MMOL/L (ref 3–14)
BASOPHILS # BLD AUTO: 0 10E9/L (ref 0–0.2)
BASOPHILS NFR BLD AUTO: 0.1 %
BLD GP AB SCN SERPL QL: ABNORMAL
BLD PROD TYP BPU: ABNORMAL
BLD PROD TYP BPU: NORMAL
BLD UNIT ID BPU: 0
BLOOD BANK CMNT PATIENT-IMP: ABNORMAL
BLOOD BANK CMNT PATIENT-IMP: ABNORMAL
BLOOD PRODUCT CODE: NORMAL
BPU ID: NORMAL
BUN SERPL-MCNC: 12 MG/DL (ref 7–30)
CALCIUM SERPL-MCNC: 8.6 MG/DL (ref 8.5–10.1)
CHLORIDE SERPL-SCNC: 108 MMOL/L (ref 94–109)
CO2 SERPL-SCNC: 26 MMOL/L (ref 20–32)
CREAT SERPL-MCNC: 0.92 MG/DL (ref 0.66–1.25)
DIFFERENTIAL METHOD BLD: ABNORMAL
EOSINOPHIL # BLD AUTO: 0 10E9/L (ref 0–0.7)
EOSINOPHIL NFR BLD AUTO: 0.3 %
ERYTHROCYTE [DISTWIDTH] IN BLOOD BY AUTOMATED COUNT: 15.2 % (ref 10–15)
GFR SERPL CREATININE-BSD FRML MDRD: 89 ML/MIN/{1.73_M2}
GLUCOSE SERPL-MCNC: 109 MG/DL (ref 70–99)
HCT VFR BLD AUTO: 24.4 % (ref 40–53)
HGB BLD-MCNC: 8.1 G/DL (ref 13.3–17.7)
IMM GRANULOCYTES # BLD: 0.1 10E9/L (ref 0–0.4)
IMM GRANULOCYTES NFR BLD: 1.6 %
LYMPHOCYTES # BLD AUTO: 1.9 10E9/L (ref 0.8–5.3)
LYMPHOCYTES NFR BLD AUTO: 25.6 %
MCH RBC QN AUTO: 30.9 PG (ref 26.5–33)
MCHC RBC AUTO-ENTMCNC: 33.2 G/DL (ref 31.5–36.5)
MCV RBC AUTO: 93 FL (ref 78–100)
MONOCYTES # BLD AUTO: 1 10E9/L (ref 0–1.3)
MONOCYTES NFR BLD AUTO: 13.5 %
NEUTROPHILS # BLD AUTO: 4.4 10E9/L (ref 1.6–8.3)
NEUTROPHILS NFR BLD AUTO: 58.9 %
NRBC # BLD AUTO: 0 10*3/UL
NRBC BLD AUTO-RTO: 0 /100
NUM BPU REQUESTED: 2
PLATELET # BLD AUTO: 16 10E9/L (ref 150–450)
POTASSIUM SERPL-SCNC: 3.7 MMOL/L (ref 3.4–5.3)
RBC # BLD AUTO: 2.62 10E12/L (ref 4.4–5.9)
SODIUM SERPL-SCNC: 141 MMOL/L (ref 133–144)
SPECIMEN EXP DATE BLD: ABNORMAL
TRANSFUSION STATUS PATIENT QL: NORMAL
WBC # BLD AUTO: 7.4 10E9/L (ref 4–11)

## 2019-07-05 PROCEDURE — 80048 BASIC METABOLIC PNL TOTAL CA: CPT | Performed by: NURSE PRACTITIONER

## 2019-07-05 PROCEDURE — P9037 PLATE PHERES LEUKOREDU IRRAD: HCPCS | Performed by: NURSE PRACTITIONER

## 2019-07-05 PROCEDURE — 86902 BLOOD TYPE ANTIGEN DONOR EA: CPT | Performed by: NURSE PRACTITIONER

## 2019-07-05 PROCEDURE — 86900 BLOOD TYPING SEROLOGIC ABO: CPT | Performed by: NURSE PRACTITIONER

## 2019-07-05 PROCEDURE — 85025 COMPLETE CBC W/AUTO DIFF WBC: CPT | Performed by: NURSE PRACTITIONER

## 2019-07-05 PROCEDURE — 86850 RBC ANTIBODY SCREEN: CPT | Performed by: NURSE PRACTITIONER

## 2019-07-05 PROCEDURE — G0463 HOSPITAL OUTPT CLINIC VISIT: HCPCS | Mod: 25

## 2019-07-05 PROCEDURE — 36430 TRANSFUSION BLD/BLD COMPNT: CPT

## 2019-07-05 PROCEDURE — 86901 BLOOD TYPING SEROLOGIC RH(D): CPT | Performed by: NURSE PRACTITIONER

## 2019-07-05 PROCEDURE — 25000128 H RX IP 250 OP 636: Mod: ZF | Performed by: NURSE PRACTITIONER

## 2019-07-05 RX ORDER — HEPARIN SODIUM,PORCINE 10 UNIT/ML
5 VIAL (ML) INTRAVENOUS
Status: DISCONTINUED | OUTPATIENT
Start: 2019-07-05 | End: 2019-07-05 | Stop reason: HOSPADM

## 2019-07-05 RX ADMIN — HEPARIN, PORCINE (PF) 10 UNIT/ML INTRAVENOUS SYRINGE 5 ML: at 07:29

## 2019-07-05 ASSESSMENT — PAIN SCALES - GENERAL: PAINLEVEL: NO PAIN (0)

## 2019-07-05 NOTE — PROGRESS NOTES
Infusion Nursing Note:  Angel Yanez presents today for one dose of Plts.    Patient seen by provider today: Yes: Karla Ellis NP   present during visit today: Not Applicable.    Note: Pt with a Plt count of 16K today. Per Provider's order Pt received one dose of Plts today. Tolerated transfusion well without Premeds. VSS throughout. See Doc Flowsheet for further details. Pt's Hgb 8.1 today- will wait until Monday to see if he needs PRBC's.     Intravenous Access:  Hsieh.    Treatment Conditions:  Lab Results   Component Value Date    HGB 8.1 07/05/2019     Lab Results   Component Value Date    WBC 7.4 07/05/2019      Lab Results   Component Value Date    ANEU 4.4 07/05/2019     Lab Results   Component Value Date    PLT 16 07/05/2019          Post Infusion Assessment:  Patient tolerated infusion without incident.       Discharge Plan:   Copy of AVS reviewed with patient and/or family.  Patient will return Monday, 7/8 for next appointment and Possible Transfusion.  Patient discharged in stable condition accompanied by: self.  Departure Mode: Ambulatory.    Radha Coker RN

## 2019-07-05 NOTE — NURSING NOTE
"Oncology Rooming Note    July 5, 2019 7:41 AM   Angel Yanez is a 59 year old male who presents for:    Chief Complaint   Patient presents with     Blood Draw     labs drawn from cvc by rn.  vital signs taken.     RECHECK     RTN-MM     Initial Vitals: /72 (BP Location: Right arm, Patient Position: Sitting, Cuff Size: Adult Regular)   Pulse 100   Temp 97.8  F (36.6  C) (Oral)   Resp 16   Wt 79.9 kg (176 lb 1.6 oz)   SpO2 98%   BMI 25.99 kg/m   Estimated body mass index is 25.99 kg/m  as calculated from the following:    Height as of 6/24/19: 1.753 m (5' 9.02\").    Weight as of this encounter: 79.9 kg (176 lb 1.6 oz). Body surface area is 1.97 meters squared.  No Pain (0) Comment: Data Unavailable   No LMP for male patient.  Allergies reviewed: Yes  Medications reviewed: Yes    Medications: Medication refills not needed today.  Pharmacy name entered into EPIC:    [facility] [place], [place] - [address]  [facility] - [place], [place] - [address] [facility]    Clinical concerns: None       Kathy Perez CMA              "

## 2019-07-08 ENCOUNTER — INFUSION THERAPY VISIT (OUTPATIENT)
Dept: TRANSPLANT | Facility: CLINIC | Age: 59
End: 2019-07-08
Attending: PHYSICIAN ASSISTANT
Payer: COMMERCIAL

## 2019-07-08 ENCOUNTER — APPOINTMENT (OUTPATIENT)
Dept: LAB | Facility: CLINIC | Age: 59
End: 2019-07-08
Attending: PHYSICIAN ASSISTANT
Payer: COMMERCIAL

## 2019-07-08 VITALS
TEMPERATURE: 98.1 F | SYSTOLIC BLOOD PRESSURE: 134 MMHG | BODY MASS INDEX: 26.02 KG/M2 | HEART RATE: 94 BPM | WEIGHT: 176.3 LBS | OXYGEN SATURATION: 99 % | DIASTOLIC BLOOD PRESSURE: 85 MMHG

## 2019-07-08 DIAGNOSIS — C90.00 MULTIPLE MYELOMA NOT HAVING ACHIEVED REMISSION (H): ICD-10-CM

## 2019-07-08 DIAGNOSIS — C90.01 MULTIPLE MYELOMA IN REMISSION (H): ICD-10-CM

## 2019-07-08 DIAGNOSIS — C90.01 MULTIPLE MYELOMA IN REMISSION (H): Primary | ICD-10-CM

## 2019-07-08 LAB
ALBUMIN SERPL-MCNC: 3.8 G/DL (ref 3.4–5)
ALP SERPL-CCNC: 80 U/L (ref 40–150)
ALT SERPL W P-5'-P-CCNC: 19 U/L (ref 0–70)
ANION GAP SERPL CALCULATED.3IONS-SCNC: 7 MMOL/L (ref 3–14)
AST SERPL W P-5'-P-CCNC: 20 U/L (ref 0–45)
BASOPHILS # BLD AUTO: 0 10E9/L (ref 0–0.2)
BASOPHILS NFR BLD AUTO: 0.2 %
BILIRUB SERPL-MCNC: 0.4 MG/DL (ref 0.2–1.3)
BLD PROD TYP BPU: NORMAL
BLD UNIT ID BPU: 0
BLOOD PRODUCT CODE: NORMAL
BPU ID: NORMAL
BUN SERPL-MCNC: 10 MG/DL (ref 7–30)
CALCIUM SERPL-MCNC: 8.8 MG/DL (ref 8.5–10.1)
CHLORIDE SERPL-SCNC: 107 MMOL/L (ref 94–109)
CO2 SERPL-SCNC: 25 MMOL/L (ref 20–32)
CREAT SERPL-MCNC: 0.99 MG/DL (ref 0.66–1.25)
DIFFERENTIAL METHOD BLD: ABNORMAL
EOSINOPHIL # BLD AUTO: 0 10E9/L (ref 0–0.7)
EOSINOPHIL NFR BLD AUTO: 0.4 %
ERYTHROCYTE [DISTWIDTH] IN BLOOD BY AUTOMATED COUNT: 16.3 % (ref 10–15)
GFR SERPL CREATININE-BSD FRML MDRD: 82 ML/MIN/{1.73_M2}
GLUCOSE SERPL-MCNC: 98 MG/DL (ref 70–99)
HCT VFR BLD AUTO: 24.6 % (ref 40–53)
HGB BLD-MCNC: 8.3 G/DL (ref 13.3–17.7)
IMM GRANULOCYTES # BLD: 0 10E9/L (ref 0–0.4)
IMM GRANULOCYTES NFR BLD: 0.2 %
LYMPHOCYTES # BLD AUTO: 2.3 10E9/L (ref 0.8–5.3)
LYMPHOCYTES NFR BLD AUTO: 50.8 %
MAGNESIUM SERPL-MCNC: 2 MG/DL (ref 1.6–2.3)
MCH RBC QN AUTO: 31.9 PG (ref 26.5–33)
MCHC RBC AUTO-ENTMCNC: 33.7 G/DL (ref 31.5–36.5)
MCV RBC AUTO: 95 FL (ref 78–100)
MONOCYTES # BLD AUTO: 0.8 10E9/L (ref 0–1.3)
MONOCYTES NFR BLD AUTO: 18 %
NEUTROPHILS # BLD AUTO: 1.4 10E9/L (ref 1.6–8.3)
NEUTROPHILS NFR BLD AUTO: 30.4 %
NRBC # BLD AUTO: 0 10*3/UL
NRBC BLD AUTO-RTO: 0 /100
PLATELET # BLD AUTO: 28 10E9/L (ref 150–450)
POTASSIUM SERPL-SCNC: 3.8 MMOL/L (ref 3.4–5.3)
PROT SERPL-MCNC: 6.8 G/DL (ref 6.8–8.8)
RBC # BLD AUTO: 2.6 10E12/L (ref 4.4–5.9)
SODIUM SERPL-SCNC: 139 MMOL/L (ref 133–144)
TRANSFUSION STATUS PATIENT QL: NORMAL
WBC # BLD AUTO: 4.5 10E9/L (ref 4–11)

## 2019-07-08 PROCEDURE — 86901 BLOOD TYPING SEROLOGIC RH(D): CPT | Performed by: PHYSICIAN ASSISTANT

## 2019-07-08 PROCEDURE — 86900 BLOOD TYPING SEROLOGIC ABO: CPT | Performed by: PHYSICIAN ASSISTANT

## 2019-07-08 PROCEDURE — 36415 COLL VENOUS BLD VENIPUNCTURE: CPT

## 2019-07-08 PROCEDURE — 80053 COMPREHEN METABOLIC PANEL: CPT | Performed by: NURSE PRACTITIONER

## 2019-07-08 PROCEDURE — G0463 HOSPITAL OUTPT CLINIC VISIT: HCPCS

## 2019-07-08 PROCEDURE — 86850 RBC ANTIBODY SCREEN: CPT | Performed by: PHYSICIAN ASSISTANT

## 2019-07-08 PROCEDURE — 85025 COMPLETE CBC W/AUTO DIFF WBC: CPT | Performed by: NURSE PRACTITIONER

## 2019-07-08 PROCEDURE — 40000268 ZZH STATISTIC NO CHARGES: Mod: ZF

## 2019-07-08 PROCEDURE — 83735 ASSAY OF MAGNESIUM: CPT | Performed by: NURSE PRACTITIONER

## 2019-07-08 PROCEDURE — 25000128 H RX IP 250 OP 636: Mod: ZF | Performed by: PHYSICIAN ASSISTANT

## 2019-07-08 PROCEDURE — 36592 COLLECT BLOOD FROM PICC: CPT

## 2019-07-08 RX ORDER — HEPARIN SODIUM,PORCINE 10 UNIT/ML
5 VIAL (ML) INTRAVENOUS ONCE
Status: COMPLETED | OUTPATIENT
Start: 2019-07-08 | End: 2019-07-08

## 2019-07-08 RX ORDER — FLUCONAZOLE 100 MG/1
100 TABLET ORAL DAILY
Qty: 30 TABLET | Refills: 1 | Status: SHIPPED | OUTPATIENT
Start: 2019-07-08 | End: 2019-08-27

## 2019-07-08 RX ADMIN — HEPARIN, PORCINE (PF) 10 UNIT/ML INTRAVENOUS SYRINGE 5 ML: at 11:58

## 2019-07-08 ASSESSMENT — PAIN SCALES - GENERAL: PAINLEVEL: NO PAIN (0)

## 2019-07-08 NOTE — NURSING NOTE
Chief Complaint   Patient presents with     Blood Draw     dressing change needed; labs drawn via cvc by RN     /85 (BP Location: Left arm, Patient Position: Chair, Cuff Size: Adult Regular)   Pulse 94   Temp 98.1  F (36.7  C) (Oral)   Wt 80 kg (176 lb 4.8 oz)   SpO2 99%   BMI 26.02 kg/m      Lines accessed by RN in lab. Labs collected through red lumen and sent. Both caps changed, lines flushed with NS & Heparin. Pt tolerated well.   Pt checked in for next appointment.    Maria Teresa Bourgeois, RN

## 2019-07-08 NOTE — PROGRESS NOTES
BMT Clinic  Note     Patient ID:  Angel Yanez is a 60 yo man Day +49 s/p 2nd auto for IgB kappa MM .      Diagnosis MM Multiple myeloma  HCT Type Autologous    Prep Regimen Cytoxan  Melphalan   Primary BMT MD Dr. Lucio       INTERVAL  HISTORY   Feeling tired, but OK.  Eating with no N/V/D.  Afebrile with chronic cough.  No pain, bleeding or rash.  He is thrilled he doesn't need any transfusions nor gcsf. Though we discussed that I Do anticipate that he'll need intermittent gcsf.     Review of Systems: 10 point ROS negative except as noted above.     PHYSICAL EXAM   Blood pressure 134/85, pulse 94, temperature 98.1  F (36.7  C), temperature source Oral, weight 80 kg (176 lb 4.8 oz), SpO2 99 %.    Wt Readings from Last 4 Encounters:   07/08/19 80 kg (176 lb 4.8 oz)   07/05/19 79.9 kg (176 lb 1.6 oz)   07/03/19 79.7 kg (175 lb 11.2 oz)   07/01/19 79.2 kg (174 lb 8 oz)     General: NAD, pleasant  Eyes:  sclera anicteric and not injected  Nose/Mouth/Throat: OP moist, no ulcerations   Lungs: CTAB  Cardiovascular: regular rate and rhythm, no M/R/G   Abdominal/Rectal: +BS, soft, NT, ND   Lymphatics: no edema  Skin: no rash or petechaie  Neuro: non-focal, no gross deficits  Additional Findings: CVC site NT, no drainage.    Labs:   Lab Results   Component Value Date    WBC 4.5 07/08/2019    ANEU 1.4 (L) 07/08/2019    HGB 8.3 (L) 07/08/2019    HCT 24.6 (L) 07/08/2019    PLT 28 (LL) 07/08/2019     07/08/2019    POTASSIUM 3.8 07/08/2019    CHLORIDE 107 07/08/2019    CO2 25 07/08/2019    GLC 98 07/08/2019    BUN 10 07/08/2019    CR 0.99 07/08/2019    MAG 2.0 07/08/2019    INR 1.10 06/03/2019    BILITOTAL 0.4 07/08/2019    AST 20 07/08/2019    ALT 19 07/08/2019    ALKPHOS 80 07/08/2019    PROTTOTAL 6.8 07/08/2019    ALBUMIN 3.8 07/08/2019          ASSESSMENT/PLAN   Angel Yanez is a 60 yo man Day +52 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  BMT/MM:   Slow engraftment; cell dose was only 0.639x10^6. ([facility]  [place] - known poor cell dose as failed chemo-mobilization 1/2019.)   - last dose GCSF 7/3 with good response     2.  HEME: Keep Hgb>8g/dL, plt >10.  Transfuse 2 unit(s)  Transfuse plt today & plan for RBC's on Monday  - Pt has RBC antibodies. Antibody work up completed 5/26.      3.  ID: Afebrile.    - +parainfluenza 3 (5/30). Ongoing cough, no hemoptysis.  Cough drops, tessalon pearls prn.   - prophy with ACV, Fluconazole, Pentamidine (6/17). Stopped prophy azithro 6/24  - CMV neg 7/1.  On CMV vaccine trial     4.  GI:  No complaints.  - GI prophy- omeprazole.       5.  FEN/Renal: Creat, lytes wnl. Eating well.      6.  Mood:  Cont Paxil       RTC Wednesday Friday  - Possible gcsf and plts on Wednesday.     SHANNON Beltran-C  [phone]

## 2019-07-08 NOTE — NURSING NOTE
"Oncology Rooming Note    July 8, 2019 12:29 PM   Angel Yanez is a 59 year old male who presents for:    Chief Complaint   Patient presents with     Blood Draw     dressing change needed; labs drawn via cvc by RN     RECHECK     Pt is here for a rtn for MM--     Initial Vitals: Blood Pressure 134/85 (BP Location: Left arm, Patient Position: Chair, Cuff Size: Adult Regular)   Pulse 94   Temperature 98.1  F (36.7  C) (Oral)   Weight 80 kg (176 lb 4.8 oz)   Oxygen Saturation 99%   Body Mass Index 26.02 kg/m   Estimated body mass index is 26.02 kg/m  as calculated from the following:    Height as of 6/24/19: 1.753 m (5' 9.02\").    Weight as of this encounter: 80 kg (176 lb 4.8 oz). Body surface area is 1.97 meters squared.  No Pain (0) Comment: Data Unavailable   No LMP for male patient.  Allergies reviewed: Yes  Medications reviewed: Yes    Medications: Medication refills not needed today.  Pharmacy name entered into EPIC:    [facility] - [place], [place] - [address] [facility] [address] - [place], [place] - [address]    Clinical concerns: none       Shoshana Salazar MA              " [phone]

## 2019-07-08 NOTE — PROGRESS NOTES
Patient did not receive any transfusions/infusion today.  He was cared for in BMT clinic, not infusion.

## 2019-07-10 ENCOUNTER — APPOINTMENT (OUTPATIENT)
Dept: LAB | Facility: CLINIC | Age: 59
End: 2019-07-10
Attending: NURSE PRACTITIONER
Payer: COMMERCIAL

## 2019-07-10 ENCOUNTER — INFUSION THERAPY VISIT (OUTPATIENT)
Dept: TRANSPLANT | Facility: CLINIC | Age: 59
End: 2019-07-10
Attending: INTERNAL MEDICINE
Payer: COMMERCIAL

## 2019-07-10 ENCOUNTER — ONCOLOGY VISIT (OUTPATIENT)
Dept: TRANSPLANT | Facility: CLINIC | Age: 59
End: 2019-07-10
Attending: NURSE PRACTITIONER
Payer: COMMERCIAL

## 2019-07-10 VITALS
BODY MASS INDEX: 25.87 KG/M2 | SYSTOLIC BLOOD PRESSURE: 117 MMHG | TEMPERATURE: 98.1 F | WEIGHT: 175.3 LBS | OXYGEN SATURATION: 96 % | HEART RATE: 82 BPM | DIASTOLIC BLOOD PRESSURE: 77 MMHG | RESPIRATION RATE: 16 BRPM

## 2019-07-10 DIAGNOSIS — C90.01 MULTIPLE MYELOMA IN REMISSION (H): Primary | ICD-10-CM

## 2019-07-10 LAB
ALBUMIN SERPL-MCNC: 3.7 G/DL (ref 3.4–5)
ALP SERPL-CCNC: 80 U/L (ref 40–150)
ALT SERPL W P-5'-P-CCNC: 19 U/L (ref 0–70)
ANION GAP SERPL CALCULATED.3IONS-SCNC: 7 MMOL/L (ref 3–14)
AST SERPL W P-5'-P-CCNC: 19 U/L (ref 0–45)
BASOPHILS # BLD AUTO: 0 10E9/L (ref 0–0.2)
BASOPHILS NFR BLD AUTO: 0.3 %
BILIRUB SERPL-MCNC: 0.4 MG/DL (ref 0.2–1.3)
BUN SERPL-MCNC: 12 MG/DL (ref 7–30)
CALCIUM SERPL-MCNC: 8.9 MG/DL (ref 8.5–10.1)
CHLORIDE SERPL-SCNC: 108 MMOL/L (ref 94–109)
CO2 SERPL-SCNC: 25 MMOL/L (ref 20–32)
COPATH REPORT: NORMAL
CREAT SERPL-MCNC: 0.92 MG/DL (ref 0.66–1.25)
DIFFERENTIAL METHOD BLD: ABNORMAL
EOSINOPHIL # BLD AUTO: 0 10E9/L (ref 0–0.7)
EOSINOPHIL NFR BLD AUTO: 0.6 %
ERYTHROCYTE [DISTWIDTH] IN BLOOD BY AUTOMATED COUNT: 17.2 % (ref 10–15)
GFR SERPL CREATININE-BSD FRML MDRD: 90 ML/MIN/{1.73_M2}
GLUCOSE SERPL-MCNC: 95 MG/DL (ref 70–99)
HCT VFR BLD AUTO: 25.1 % (ref 40–53)
HGB BLD-MCNC: 8.5 G/DL (ref 13.3–17.7)
IMM GRANULOCYTES # BLD: 0 10E9/L (ref 0–0.4)
IMM GRANULOCYTES NFR BLD: 0 %
LYMPHOCYTES # BLD AUTO: 1.9 10E9/L (ref 0.8–5.3)
LYMPHOCYTES NFR BLD AUTO: 59.5 %
MCH RBC QN AUTO: 32.1 PG (ref 26.5–33)
MCHC RBC AUTO-ENTMCNC: 33.9 G/DL (ref 31.5–36.5)
MCV RBC AUTO: 95 FL (ref 78–100)
MONOCYTES # BLD AUTO: 0.7 10E9/L (ref 0–1.3)
MONOCYTES NFR BLD AUTO: 21.5 %
NEUTROPHILS # BLD AUTO: 0.6 10E9/L (ref 1.6–8.3)
NEUTROPHILS NFR BLD AUTO: 18.1 %
NRBC # BLD AUTO: 0 10*3/UL
NRBC BLD AUTO-RTO: 0 /100
PLATELET # BLD AUTO: 24 10E9/L (ref 150–450)
PLATELET # BLD EST: ABNORMAL 10*3/UL
POTASSIUM SERPL-SCNC: 4 MMOL/L (ref 3.4–5.3)
PROT SERPL-MCNC: 7.1 G/DL (ref 6.8–8.8)
RBC # BLD AUTO: 2.65 10E12/L (ref 4.4–5.9)
RBC MORPH BLD: ABNORMAL
SODIUM SERPL-SCNC: 140 MMOL/L (ref 133–144)
WBC # BLD AUTO: 3.3 10E9/L (ref 4–11)

## 2019-07-10 PROCEDURE — 96372 THER/PROPH/DIAG INJ SC/IM: CPT

## 2019-07-10 PROCEDURE — 25000128 H RX IP 250 OP 636: Mod: ZF | Performed by: NURSE PRACTITIONER

## 2019-07-10 PROCEDURE — 85025 COMPLETE CBC W/AUTO DIFF WBC: CPT | Performed by: INTERNAL MEDICINE

## 2019-07-10 PROCEDURE — 40000268 ZZH STATISTIC NO CHARGES: Mod: ZF

## 2019-07-10 PROCEDURE — 25000128 H RX IP 250 OP 636: Mod: ZF | Performed by: PHYSICIAN ASSISTANT

## 2019-07-10 PROCEDURE — 80053 COMPREHEN METABOLIC PANEL: CPT | Performed by: INTERNAL MEDICINE

## 2019-07-10 PROCEDURE — G0463 HOSPITAL OUTPT CLINIC VISIT: HCPCS | Mod: ZF

## 2019-07-10 RX ORDER — HEPARIN SODIUM,PORCINE 10 UNIT/ML
5 VIAL (ML) INTRAVENOUS
Status: DISCONTINUED | OUTPATIENT
Start: 2019-07-10 | End: 2019-07-10 | Stop reason: HOSPADM

## 2019-07-10 RX ORDER — HEPARIN SODIUM,PORCINE 10 UNIT/ML
5 VIAL (ML) INTRAVENOUS
Status: CANCELLED | OUTPATIENT
Start: 2019-07-10

## 2019-07-10 RX ADMIN — HEPARIN, PORCINE (PF) 10 UNIT/ML INTRAVENOUS SYRINGE 5 ML: at 08:35

## 2019-07-10 RX ADMIN — FILGRASTIM 480 MCG: 480 INJECTION, SOLUTION INTRAVENOUS; SUBCUTANEOUS at 09:27

## 2019-07-10 ASSESSMENT — PAIN SCALES - GENERAL: PAINLEVEL: NO PAIN (0)

## 2019-07-10 NOTE — NURSING NOTE
"Oncology Rooming Note    July 10, 2019 8:59 AM   Angel Yanez is a 59 year old male who presents for:    Chief Complaint   Patient presents with     Blood Draw     labs drawn from cvc by rn.  vital signs taken.     RECHECK     post bmt for MM here for labs and md visit      Initial Vitals: /77 (BP Location: Right arm, Patient Position: Sitting, Cuff Size: Adult Regular)   Pulse 82   Temp 98.1  F (36.7  C) (Oral)   Resp 16   Wt 79.5 kg (175 lb 4.8 oz)   SpO2 96%   BMI 25.87 kg/m   Estimated body mass index is 25.87 kg/m  as calculated from the following:    Height as of 6/24/19: 1.753 m (5' 9.02\").    Weight as of this encounter: 79.5 kg (175 lb 4.8 oz). Body surface area is 1.97 meters squared.  No Pain (0) Comment: Data Unavailable   No LMP for male patient.  Allergies reviewed: Yes  Medications reviewed: Yes    Medications: Medication refills not needed today.  Pharmacy name entered into EPIC:    [facility] - [place], [place] - [address] [facility]AC[facility] - [place], [place] - [address] [facility]    Clinical concerns: none       Eloisa King RN              "

## 2019-07-10 NOTE — PROGRESS NOTES
BMT Clinic  Note     Patient ID:  Angel Yanez is a 60 yo man Day +54 s/p 2nd auto for IgB kappa MM .      Diagnosis MM Multiple myeloma  HCT Type Autologous    Prep Regimen Cytoxan  Melphalan   Primary BMT MD Dr. Lucio       INTERVAL  HISTORY   Guille is really feeling well. Able to walk around the block few times each day. Energy is improving, no GI symptoms. Notes taht he doesn't have heartburn so okay to try stopping PPI. He continues to dry cough - exacerbated by coughing but no sob or fevers.     Review of Systems: 10 point ROS negative except as noted above.     PHYSICAL EXAM   Blood pressure 117/77, pulse 82, temperature 98.1  F (36.7  C), temperature source Oral, resp. rate 16, weight 79.5 kg (175 lb 4.8 oz), SpO2 96 %.    Wt Readings from Last 4 Encounters:   07/10/19 79.5 kg (175 lb 4.8 oz)   07/08/19 80 kg (176 lb 4.8 oz)   07/05/19 79.9 kg (176 lb 1.6 oz)   07/03/19 79.7 kg (175 lb 11.2 oz)     General: NAD, pleasant  Eyes:  sclera anicteric and not injected  Nose/Mouth/Throat: OP moist, no ulcerations   Lungs: CTAB  Cardiovascular: regular rate and rhythm, no M/R/G   Abdominal/Rectal: +BS, soft, NT, ND   Lymphatics: no edema  Skin: no rash or petechaie  Neuro: non-focal, no gross deficits  Additional Findings: CVC site NT, no drainage.    Labs:   Lab Results   Component Value Date    WBC 4.5 07/08/2019    ANEU 1.4 (L) 07/08/2019    HGB 8.3 (L) 07/08/2019    HCT 24.6 (L) 07/08/2019    PLT 28 (LL) 07/08/2019     07/08/2019    POTASSIUM 3.8 07/08/2019    CHLORIDE 107 07/08/2019    CO2 25 07/08/2019    GLC 98 07/08/2019    BUN 10 07/08/2019    CR 0.99 07/08/2019    MAG 2.0 07/08/2019    INR 1.10 06/03/2019    BILITOTAL 0.4 07/08/2019    AST 20 07/08/2019    ALT 19 07/08/2019    ALKPHOS 80 07/08/2019    PROTTOTAL 6.8 07/08/2019    ALBUMIN 3.8 07/08/2019          ASSESSMENT/PLAN   Angel Yanez is a 60 yo man Day +54 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  BMT/MM:   Slow engraftment; cell  dose was only 0.639x10^6. ([facility] [place] - known poor cell dose as failed chemo-mobilization 1/2019.)   - last dose GCSF 7/3 with good response. WBC gradually trending down- give GCSF today for ANC 0.6. F/u counts Monday.      2.  HEME: Keep Hgb>8g/dL, plt >10.   - Pt has RBC antibodies. Antibody work up completed 5/26.      3.  ID: Afebrile.    - +parainfluenza 3 (5/30). Ongoing cough, no hemoptysis.  Cough drops, tessalon pearls prn.   - prophy with ACV, Fluconazole, Pentamidine (6/17). Stopped prophy azithro 6/24  - CMV neg 7/1.  On CMV vaccine trial     4.  GI:  No complaints.  - GI prophy- omeprazole.       5.  FEN/Renal: Creat, lytes wnl. Eating well.      6.  Mood:  Cont Paxil       RTC Friday  - Possible gcsf and plts on Monday.     Myrna Marrufo PA-C  [phone]

## 2019-07-10 NOTE — NURSING NOTE
Administered Nuepogen in to the right arm without any complications. Pt tolerated well. Vickie Harris CMA

## 2019-07-10 NOTE — PROGRESS NOTES
Patient met his heme parameters and did not require any transfusions today.  He was cared for in BMT clinic, not infusion.

## 2019-07-15 ENCOUNTER — ONCOLOGY VISIT (OUTPATIENT)
Dept: TRANSPLANT | Facility: CLINIC | Age: 59
End: 2019-07-15
Attending: STUDENT IN AN ORGANIZED HEALTH CARE EDUCATION/TRAINING PROGRAM
Payer: COMMERCIAL

## 2019-07-15 ENCOUNTER — APPOINTMENT (OUTPATIENT)
Dept: LAB | Facility: CLINIC | Age: 59
End: 2019-07-15
Attending: STUDENT IN AN ORGANIZED HEALTH CARE EDUCATION/TRAINING PROGRAM
Payer: COMMERCIAL

## 2019-07-15 VITALS
DIASTOLIC BLOOD PRESSURE: 82 MMHG | RESPIRATION RATE: 16 BRPM | HEART RATE: 74 BPM | TEMPERATURE: 98.3 F | OXYGEN SATURATION: 98 % | SYSTOLIC BLOOD PRESSURE: 123 MMHG

## 2019-07-15 VITALS
HEART RATE: 93 BPM | WEIGHT: 174 LBS | OXYGEN SATURATION: 100 % | SYSTOLIC BLOOD PRESSURE: 127 MMHG | BODY MASS INDEX: 25.68 KG/M2 | TEMPERATURE: 97.4 F | DIASTOLIC BLOOD PRESSURE: 82 MMHG | RESPIRATION RATE: 16 BRPM

## 2019-07-15 DIAGNOSIS — C90.01 MULTIPLE MYELOMA IN REMISSION (H): ICD-10-CM

## 2019-07-15 DIAGNOSIS — C90.01 MULTIPLE MYELOMA IN REMISSION (H): Primary | ICD-10-CM

## 2019-07-15 DIAGNOSIS — C90.00 MULTIPLE MYELOMA NOT HAVING ACHIEVED REMISSION (H): Primary | ICD-10-CM

## 2019-07-15 LAB
ABO + RH BLD: ABNORMAL
ALBUMIN SERPL-MCNC: 3.8 G/DL (ref 3.4–5)
ALP SERPL-CCNC: 85 U/L (ref 40–150)
ALT SERPL W P-5'-P-CCNC: 19 U/L (ref 0–70)
ANION GAP SERPL CALCULATED.3IONS-SCNC: 6 MMOL/L (ref 3–14)
AST SERPL W P-5'-P-CCNC: 26 U/L (ref 0–45)
BASOPHILS # BLD AUTO: 0 10E9/L (ref 0–0.2)
BASOPHILS NFR BLD AUTO: 0.3 %
BILIRUB SERPL-MCNC: 0.4 MG/DL (ref 0.2–1.3)
BLD GP AB SCN SERPL QL: ABNORMAL
BLD GP AB SCN SERPL QL: ABNORMAL
BLD PROD TYP BPU: ABNORMAL
BLD PROD TYP BPU: NORMAL
BLD UNIT ID BPU: 0
BLOOD BANK CMNT PATIENT-IMP: ABNORMAL
BLOOD PRODUCT CODE: NORMAL
BPU ID: NORMAL
BUN SERPL-MCNC: 20 MG/DL (ref 7–30)
CALCIUM SERPL-MCNC: 9 MG/DL (ref 8.5–10.1)
CHLORIDE SERPL-SCNC: 106 MMOL/L (ref 94–109)
CO2 SERPL-SCNC: 26 MMOL/L (ref 20–32)
CREAT SERPL-MCNC: 0.92 MG/DL (ref 0.66–1.25)
DIFFERENTIAL METHOD BLD: ABNORMAL
EOSINOPHIL # BLD AUTO: 0.1 10E9/L (ref 0–0.7)
EOSINOPHIL NFR BLD AUTO: 1.5 %
ERYTHROCYTE [DISTWIDTH] IN BLOOD BY AUTOMATED COUNT: 21 % (ref 10–15)
GFR SERPL CREATININE-BSD FRML MDRD: 89 ML/MIN/{1.73_M2}
GLUCOSE SERPL-MCNC: 91 MG/DL (ref 70–99)
HCT VFR BLD AUTO: 25.1 % (ref 40–53)
HGB BLD-MCNC: 8.4 G/DL (ref 13.3–17.7)
IMM GRANULOCYTES # BLD: 0 10E9/L (ref 0–0.4)
IMM GRANULOCYTES NFR BLD: 0.3 %
LYMPHOCYTES # BLD AUTO: 1.6 10E9/L (ref 0.8–5.3)
LYMPHOCYTES NFR BLD AUTO: 48.7 %
MCH RBC QN AUTO: 32.6 PG (ref 26.5–33)
MCHC RBC AUTO-ENTMCNC: 33.5 G/DL (ref 31.5–36.5)
MCV RBC AUTO: 97 FL (ref 78–100)
MONOCYTES # BLD AUTO: 0.7 10E9/L (ref 0–1.3)
MONOCYTES NFR BLD AUTO: 20.5 %
NEUTROPHILS # BLD AUTO: 1 10E9/L (ref 1.6–8.3)
NEUTROPHILS NFR BLD AUTO: 28.7 %
NRBC # BLD AUTO: 0 10*3/UL
NRBC BLD AUTO-RTO: 0 /100
NUM BPU REQUESTED: 2
PLATELET # BLD AUTO: 26 10E9/L (ref 150–450)
PLATELET # BLD EST: ABNORMAL 10*3/UL
POTASSIUM SERPL-SCNC: 4 MMOL/L (ref 3.4–5.3)
PROT SERPL-MCNC: 7.1 G/DL (ref 6.8–8.8)
RBC # BLD AUTO: 2.58 10E12/L (ref 4.4–5.9)
RBC MORPH BLD: ABNORMAL
SODIUM SERPL-SCNC: 137 MMOL/L (ref 133–144)
SPECIMEN EXP DATE BLD: ABNORMAL
SPECIMEN EXP DATE BLD: ABNORMAL
TRANSFUSION STATUS PATIENT QL: NORMAL
TRANSFUSION STATUS PATIENT QL: NORMAL
WBC # BLD AUTO: 3.4 10E9/L (ref 4–11)

## 2019-07-15 PROCEDURE — 25000132 ZZH RX MED GY IP 250 OP 250 PS 637: Mod: ZF | Performed by: PHYSICIAN ASSISTANT

## 2019-07-15 PROCEDURE — 94642 AEROSOL INHALATION TREATMENT: CPT

## 2019-07-15 PROCEDURE — 90472 IMMUNIZATION ADMIN EACH ADD: CPT

## 2019-07-15 PROCEDURE — 94642 AEROSOL INHALATION TREATMENT: CPT | Mod: ZF

## 2019-07-15 PROCEDURE — 85025 COMPLETE CBC W/AUTO DIFF WBC: CPT | Performed by: PHYSICIAN ASSISTANT

## 2019-07-15 PROCEDURE — 25000125 ZZHC RX 250: Mod: ZF | Performed by: STUDENT IN AN ORGANIZED HEALTH CARE EDUCATION/TRAINING PROGRAM

## 2019-07-15 PROCEDURE — 90471 IMMUNIZATION ADMIN: CPT

## 2019-07-15 PROCEDURE — 93010 ELECTROCARDIOGRAM REPORT: CPT | Mod: ZP | Performed by: INTERNAL MEDICINE

## 2019-07-15 PROCEDURE — 86901 BLOOD TYPING SEROLOGIC RH(D): CPT | Performed by: STUDENT IN AN ORGANIZED HEALTH CARE EDUCATION/TRAINING PROGRAM

## 2019-07-15 PROCEDURE — 80053 COMPREHEN METABOLIC PANEL: CPT | Performed by: PHYSICIAN ASSISTANT

## 2019-07-15 PROCEDURE — 86902 BLOOD TYPE ANTIGEN DONOR EA: CPT | Performed by: STUDENT IN AN ORGANIZED HEALTH CARE EDUCATION/TRAINING PROGRAM

## 2019-07-15 PROCEDURE — 86850 RBC ANTIBODY SCREEN: CPT | Performed by: STUDENT IN AN ORGANIZED HEALTH CARE EDUCATION/TRAINING PROGRAM

## 2019-07-15 PROCEDURE — P9037 PLATE PHERES LEUKOREDU IRRAD: HCPCS | Performed by: NURSE PRACTITIONER

## 2019-07-15 PROCEDURE — 36430 TRANSFUSION BLD/BLD COMPNT: CPT

## 2019-07-15 PROCEDURE — 86900 BLOOD TYPING SEROLOGIC ABO: CPT | Performed by: STUDENT IN AN ORGANIZED HEALTH CARE EDUCATION/TRAINING PROGRAM

## 2019-07-15 PROCEDURE — 25000128 H RX IP 250 OP 636: Mod: ZF | Performed by: STUDENT IN AN ORGANIZED HEALTH CARE EDUCATION/TRAINING PROGRAM

## 2019-07-15 RX ORDER — HEPARIN SODIUM,PORCINE 10 UNIT/ML
5 VIAL (ML) INTRAVENOUS
Status: CANCELLED | OUTPATIENT
Start: 2019-07-15

## 2019-07-15 RX ORDER — HEPARIN SODIUM,PORCINE 10 UNIT/ML
5 VIAL (ML) INTRAVENOUS
Status: DISCONTINUED | OUTPATIENT
Start: 2019-07-15 | End: 2019-07-16 | Stop reason: HOSPADM

## 2019-07-15 RX ORDER — ACETAMINOPHEN 325 MG/1
650 TABLET ORAL
Status: DISCONTINUED | OUTPATIENT
Start: 2019-07-15 | End: 2019-07-16 | Stop reason: HOSPADM

## 2019-07-15 RX ORDER — HEPARIN SODIUM,PORCINE 10 UNIT/ML
5 VIAL (ML) INTRAVENOUS
Status: DISCONTINUED | OUTPATIENT
Start: 2019-07-15 | End: 2019-07-15 | Stop reason: HOSPADM

## 2019-07-15 RX ORDER — ALBUTEROL SULFATE 5 MG/ML
2.5 SOLUTION RESPIRATORY (INHALATION)
Status: CANCELLED
Start: 2019-08-15

## 2019-07-15 RX ORDER — HEPARIN SODIUM (PORCINE) LOCK FLUSH IV SOLN 100 UNIT/ML 100 UNIT/ML
5 SOLUTION INTRAVENOUS
Status: CANCELLED | OUTPATIENT
Start: 2019-07-15

## 2019-07-15 RX ORDER — PENTAMIDINE ISETHIONATE 300 MG/300MG
300 INHALANT RESPIRATORY (INHALATION)
Status: CANCELLED
Start: 2019-08-15

## 2019-07-15 RX ORDER — ALBUTEROL SULFATE 0.83 MG/ML
2.5 SOLUTION RESPIRATORY (INHALATION)
Status: DISCONTINUED | OUTPATIENT
Start: 2019-07-15 | End: 2019-07-16 | Stop reason: HOSPADM

## 2019-07-15 RX ADMIN — HEPARIN, PORCINE (PF) 10 UNIT/ML INTRAVENOUS SYRINGE 5 ML: at 09:50

## 2019-07-15 RX ADMIN — ACETAMINOPHEN 650 MG: 325 TABLET ORAL at 11:18

## 2019-07-15 RX ADMIN — ALBUTEROL SULFATE 2.5 MG: 2.5 SOLUTION RESPIRATORY (INHALATION) at 11:17

## 2019-07-15 RX ADMIN — PENTAMIDINE ISETHIONATE 300 MG: 300 INJECTION, POWDER, LYOPHILIZED, FOR SOLUTION INTRAMUSCULAR; INTRAVENOUS at 12:02

## 2019-07-15 ASSESSMENT — PAIN SCALES - GENERAL: PAINLEVEL: NO PAIN (0)

## 2019-07-15 NOTE — NURSING NOTE
Performed an EKG on pt in clinic for research. Also gave patient injection of CMV vaccine into the left deltoid and Tdap into the right deltoid. Patient tolerated well with no bleeding to speak of. 30 minute post dose vitals were also taken. Please see source doc and/or flowsheet for results.  Ashley Gardner, BLAINEA

## 2019-07-15 NOTE — PROGRESS NOTES
Infusion Nursing Note:  Angel Yanez presents today for 1 unit of platelets.    Patient seen by provider today: Yes: Mony Pitts   present during visit today: Not Applicable.    Note: Patient here for platelets. He is above his parameters but since he is getting a study IM injection, he needed to get platelets before injection. Patient tolerated transfusion well without any issues or side affects.     Intravenous Access:  Hsieh.    Treatment Conditions:  Lab Results   Component Value Date    HGB 8.4 07/15/2019     Lab Results   Component Value Date    WBC 3.4 07/15/2019      Lab Results   Component Value Date    ANEU 1.0 07/15/2019     Lab Results   Component Value Date    PLT 26 07/15/2019      Results reviewed, labs MET treatment parameters, ok to proceed with treatment.      Post Infusion Assessment:  Patient tolerated infusion without incident.  Blood return noted pre and post infusion.  Site patent and intact, free from redness, edema or discomfort.  No evidence of extravasations.  Access discontinued per protocol.       Discharge Plan:   Patient discharged in stable condition accompanied by: self.  Departure Mode: Ambulatory.    NAKITA SINCLAIR RN

## 2019-07-15 NOTE — PROGRESS NOTES
BMT Clinic  Note     Patient ID:  Angel Yanez is a 60 yo man Day +59 s/p 2nd auto for IgB kappa MM .      Diagnosis MM Multiple myeloma  HCT Type Autologous    Prep Regimen Cytoxan  Melphalan   Primary BMT MD Dr. Lucio       INTERVAL  HISTORY   Guille returns for follow up. Feeling well without n/v/d. No fevers, chills or congestion. Occasional lingering dry cough without chest pain or sob.    Review of Systems: 10 point ROS negative except as noted above.     PHYSICAL EXAM   Blood pressure 112/73, pulse 100, temperature 98.1  F (36.7  C), temperature source Oral, resp. rate 16, weight 78.9 kg (174 lb), SpO2 97 %.    Wt Readings from Last 4 Encounters:   07/15/19 78.9 kg (174 lb)   07/10/19 79.5 kg (175 lb 4.8 oz)   07/08/19 80 kg (176 lb 4.8 oz)   07/05/19 79.9 kg (176 lb 1.6 oz)     General: NAD, pleasant  Eyes:  sclera anicteric and not injected  Nose/Mouth/Throat: OP moist, no ulcerations   Lungs: CTAB  Cardiovascular: regular rate and rhythm, no M/R/G   Abdominal/Rectal: +BS, soft, NT, ND   Lymphatics: no edema  Skin: no rash or petechaie  Neuro: non-focal, no gross deficits  Additional Findings: CVC site NT, no drainage.    Labs:   Lab Results   Component Value Date    WBC 3.4 (L) 07/15/2019    ANEU 1.0 (L) 07/15/2019    HGB 8.4 (L) 07/15/2019    HCT 25.1 (L) 07/15/2019    PLT 26 (LL) 07/15/2019     07/15/2019    POTASSIUM 4.0 07/15/2019    CHLORIDE 106 07/15/2019    CO2 26 07/15/2019    GLC 91 07/15/2019    BUN 20 07/15/2019    CR 0.92 07/15/2019    MAG 2.0 07/08/2019    INR 1.10 06/03/2019    BILITOTAL 0.4 07/15/2019    AST 26 07/15/2019    ALT 19 07/15/2019    ALKPHOS 85 07/15/2019    PROTTOTAL 7.1 07/15/2019    ALBUMIN 3.8 07/15/2019          ASSESSMENT/PLAN   Angel Yanez is a 60 yo man Day +59 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  BMT/MM:   Slow engraftment; cell dose was only 0.639x10^6. ([facility] [place] - known poor cell dose as failed chemo-mobilization 1/2019.)   - last dose GCSF  7/3 and 7/10, borderline      2.  HEME: Keep Hgb>8g/dL, plt >10. Plt given as CMV vaccine IM  - Pt has RBC antibodies. Antibody work up completed 5/26.      3.  ID: Afebrile.    - +parainfluenza 3 (5/30). Ongoing cough, no hemoptysis.  Cough drops, tessalon pearls prn.   - prophy with ACV, Fluconazole, Pentamidine (7/15). Stopped prophy azithro 6/24  - CMV neg 7/1. On CMV vaccine trial. ANC 1.0, study requires >1; discussed with Dr Mukherjee, appropriate to still give as planned. No infectious sx.      4.  GI:  No complaints.  - GI prophy- omeprazole.       5.  FEN/Renal: Creat, lytes wnl. Eating well.      6.  Mood:  Cont Paxil       RTC Thursday provider visit, possible gcsf (no plt ordered as plt appear to be stabilizing); given platelets today for CMV injection (plt above parameter)    Mony Pitts PAC  [phone]

## 2019-07-15 NOTE — NURSING NOTE
"Oncology Rooming Note    July 15, 2019 10:11 AM   Angel Yanez is a 59 year old male who presents for:    Chief Complaint   Patient presents with     Blood Draw     labs drawn from cvc by rn.  vital signs taken.     RECHECK     Pt is here for labs and to see NP     Initial Vitals: Blood Pressure 112/73 (BP Location: Right arm, Patient Position: Sitting, Cuff Size: Adult Regular)   Pulse 100   Temperature 98.1  F (36.7  C) (Oral)   Respiration 16   Weight 78.9 kg (174 lb)   Oxygen Saturation 97%   Body Mass Index 25.68 kg/m   Estimated body mass index is 25.68 kg/m  as calculated from the following:    Height as of 6/24/19: 1.753 m (5' 9.02\").    Weight as of this encounter: 78.9 kg (174 lb). Body surface area is 1.96 meters squared.  No Pain (0) Comment: Data Unavailable   No LMP for male patient.  Allergies reviewed: Yes  Medications reviewed: Yes    Medications: Medication refills not needed today.  Pharmacy name entered into EPIC:    [facility] - [place], [place] - [address] [facility]ARMAC[facility] - [place], [place] - [address]    Clinical concerns: Pt is feeling good     Shoshanajenny Salazar MA              "

## 2019-07-18 ENCOUNTER — APPOINTMENT (OUTPATIENT)
Dept: LAB | Facility: CLINIC | Age: 59
End: 2019-07-18
Attending: STUDENT IN AN ORGANIZED HEALTH CARE EDUCATION/TRAINING PROGRAM
Payer: COMMERCIAL

## 2019-07-18 ENCOUNTER — ONCOLOGY VISIT (OUTPATIENT)
Dept: TRANSPLANT | Facility: CLINIC | Age: 59
End: 2019-07-18
Attending: STUDENT IN AN ORGANIZED HEALTH CARE EDUCATION/TRAINING PROGRAM
Payer: COMMERCIAL

## 2019-07-18 VITALS
TEMPERATURE: 98.7 F | RESPIRATION RATE: 16 BRPM | DIASTOLIC BLOOD PRESSURE: 69 MMHG | BODY MASS INDEX: 25.78 KG/M2 | HEART RATE: 101 BPM | SYSTOLIC BLOOD PRESSURE: 116 MMHG | OXYGEN SATURATION: 98 % | WEIGHT: 174.7 LBS

## 2019-07-18 VITALS
RESPIRATION RATE: 16 BRPM | TEMPERATURE: 98.4 F | HEART RATE: 68 BPM | DIASTOLIC BLOOD PRESSURE: 88 MMHG | SYSTOLIC BLOOD PRESSURE: 136 MMHG | OXYGEN SATURATION: 99 %

## 2019-07-18 DIAGNOSIS — C90.01 MULTIPLE MYELOMA IN REMISSION (H): Primary | ICD-10-CM

## 2019-07-18 LAB
ABO + RH BLD: ABNORMAL
ABO + RH BLD: ABNORMAL
ALBUMIN SERPL-MCNC: 3.7 G/DL (ref 3.4–5)
ALP SERPL-CCNC: 72 U/L (ref 40–150)
ALT SERPL W P-5'-P-CCNC: 20 U/L (ref 0–70)
ANION GAP SERPL CALCULATED.3IONS-SCNC: 3 MMOL/L (ref 3–14)
AST SERPL W P-5'-P-CCNC: 25 U/L (ref 0–45)
BASOPHILS # BLD AUTO: 0 10E9/L (ref 0–0.2)
BASOPHILS NFR BLD AUTO: 0.3 %
BILIRUB SERPL-MCNC: 0.4 MG/DL (ref 0.2–1.3)
BLD GP AB SCN SERPL QL: ABNORMAL
BLD PROD TYP BPU: ABNORMAL
BLD PROD TYP BPU: NORMAL
BLD UNIT ID BPU: 0
BLOOD BANK CMNT PATIENT-IMP: ABNORMAL
BLOOD BANK CMNT PATIENT-IMP: ABNORMAL
BLOOD PRODUCT CODE: NORMAL
BPU ID: NORMAL
BUN SERPL-MCNC: 14 MG/DL (ref 7–30)
CALCIUM SERPL-MCNC: 8.9 MG/DL (ref 8.5–10.1)
CHLORIDE SERPL-SCNC: 110 MMOL/L (ref 94–109)
CO2 SERPL-SCNC: 27 MMOL/L (ref 20–32)
CREAT SERPL-MCNC: 0.89 MG/DL (ref 0.66–1.25)
DIFFERENTIAL METHOD BLD: ABNORMAL
EOSINOPHIL # BLD AUTO: 0 10E9/L (ref 0–0.7)
EOSINOPHIL NFR BLD AUTO: 1.4 %
ERYTHROCYTE [DISTWIDTH] IN BLOOD BY AUTOMATED COUNT: 22.4 % (ref 10–15)
GFR SERPL CREATININE-BSD FRML MDRD: >90 ML/MIN/{1.73_M2}
GLUCOSE SERPL-MCNC: 92 MG/DL (ref 70–99)
HCT VFR BLD AUTO: 23.5 % (ref 40–53)
HGB BLD-MCNC: 7.8 G/DL (ref 13.3–17.7)
IMM GRANULOCYTES # BLD: 0 10E9/L (ref 0–0.4)
IMM GRANULOCYTES NFR BLD: 0 %
LYMPHOCYTES # BLD AUTO: 1.6 10E9/L (ref 0.8–5.3)
LYMPHOCYTES NFR BLD AUTO: 53.8 %
MCH RBC QN AUTO: 32.6 PG (ref 26.5–33)
MCHC RBC AUTO-ENTMCNC: 33.2 G/DL (ref 31.5–36.5)
MCV RBC AUTO: 98 FL (ref 78–100)
MONOCYTES # BLD AUTO: 0.5 10E9/L (ref 0–1.3)
MONOCYTES NFR BLD AUTO: 18.8 %
NEUTROPHILS # BLD AUTO: 0.7 10E9/L (ref 1.6–8.3)
NEUTROPHILS NFR BLD AUTO: 25.7 %
NRBC # BLD AUTO: 0 10*3/UL
NRBC BLD AUTO-RTO: 0 /100
NUM BPU REQUESTED: 2
PLATELET # BLD AUTO: 38 10E9/L (ref 150–450)
POTASSIUM SERPL-SCNC: 3.9 MMOL/L (ref 3.4–5.3)
PROT SERPL-MCNC: 6.7 G/DL (ref 6.8–8.8)
RBC # BLD AUTO: 2.39 10E12/L (ref 4.4–5.9)
SODIUM SERPL-SCNC: 140 MMOL/L (ref 133–144)
SPECIMEN EXP DATE BLD: ABNORMAL
TRANSFUSION STATUS PATIENT QL: NORMAL
WBC # BLD AUTO: 2.9 10E9/L (ref 4–11)

## 2019-07-18 PROCEDURE — 85025 COMPLETE CBC W/AUTO DIFF WBC: CPT | Performed by: PHYSICIAN ASSISTANT

## 2019-07-18 PROCEDURE — 25000128 H RX IP 250 OP 636: Mod: ZF | Performed by: PHYSICIAN ASSISTANT

## 2019-07-18 PROCEDURE — P9040 RBC LEUKOREDUCED IRRADIATED: HCPCS | Performed by: STUDENT IN AN ORGANIZED HEALTH CARE EDUCATION/TRAINING PROGRAM

## 2019-07-18 PROCEDURE — 36430 TRANSFUSION BLD/BLD COMPNT: CPT

## 2019-07-18 PROCEDURE — 80053 COMPREHEN METABOLIC PANEL: CPT | Performed by: PHYSICIAN ASSISTANT

## 2019-07-18 PROCEDURE — 96372 THER/PROPH/DIAG INJ SC/IM: CPT

## 2019-07-18 PROCEDURE — G0463 HOSPITAL OUTPT CLINIC VISIT: HCPCS | Mod: 25,ZF

## 2019-07-18 RX ORDER — PENTAMIDINE ISETHIONATE 300 MG/300MG
300 INHALANT RESPIRATORY (INHALATION)
Status: CANCELLED
Start: 2019-08-15

## 2019-07-18 RX ORDER — HEPARIN SODIUM,PORCINE 10 UNIT/ML
5 VIAL (ML) INTRAVENOUS
Status: CANCELLED | OUTPATIENT
Start: 2019-08-15

## 2019-07-18 RX ORDER — AZITHROMYCIN 250 MG/1
250 TABLET, FILM COATED ORAL DAILY
Qty: 30 TABLET | Refills: 1 | Status: ON HOLD | OUTPATIENT
Start: 2019-07-18 | End: 2019-07-25

## 2019-07-18 RX ORDER — ALBUTEROL SULFATE 5 MG/ML
2.5 SOLUTION RESPIRATORY (INHALATION)
Status: CANCELLED
Start: 2019-08-15

## 2019-07-18 RX ORDER — HEPARIN SODIUM,PORCINE 10 UNIT/ML
5 VIAL (ML) INTRAVENOUS
Status: DISCONTINUED | OUTPATIENT
Start: 2019-07-18 | End: 2019-07-18 | Stop reason: HOSPADM

## 2019-07-18 RX ADMIN — HEPARIN, PORCINE (PF) 10 UNIT/ML INTRAVENOUS SYRINGE 5 ML: at 08:15

## 2019-07-18 RX ADMIN — FILGRASTIM 480 MCG: 480 INJECTION, SOLUTION INTRAVENOUS; SUBCUTANEOUS at 09:29

## 2019-07-18 RX ADMIN — HEPARIN, PORCINE (PF) 10 UNIT/ML INTRAVENOUS SYRINGE 5 ML: at 08:16

## 2019-07-18 ASSESSMENT — PAIN SCALES - GENERAL
PAINLEVEL: NO PAIN (0)
PAINLEVEL: NO PAIN (0)

## 2019-07-18 NOTE — PROGRESS NOTES
BMT Clinic  Note     Patient ID:  Angel Yanez is a 60 yo man Day +62 s/p 2nd auto for IgB kappa MM .      Diagnosis MM Multiple myeloma  HCT Type Autologous    Prep Regimen Cytoxan  Melphalan   Primary BMT MD Dr. Lucio       INTERVAL  HISTORY     Guille is here for follow up. Was seen earlier this week when he had D+56 CMV vaccine. Received platelets prior. No bleeding, bruising at the injection site. Arm was sore and he felt malaise the day after the vaccine. Now feeling back to normal. Continues to have nasal congestion and mostly dry cough. No fevers. No fatigue. No GI symptoms. No bleeding    Review of Systems: 10 point ROS negative except as noted above.     PHYSICAL EXAM   Blood pressure 116/69, pulse 101, temperature 98.7  F (37.1  C), resp. rate 16, weight 79.2 kg (174 lb 11.2 oz), SpO2 98 %.    Wt Readings from Last 4 Encounters:   07/15/19 78.9 kg (174 lb)   07/10/19 79.5 kg (175 lb 4.8 oz)   07/08/19 80 kg (176 lb 4.8 oz)   07/05/19 79.9 kg (176 lb 1.6 oz)     General: NAD, pleasant  Eyes:  sclera anicteric and not injected  Nose/Mouth/Throat: OP moist, no ulcerations   Lungs: Few scattered crackles, but breathing comfortable on room air. Occasionally coughing  Cardiovascular: regular rate and rhythm, no M/R/G   Abdominal/Rectal: +BS, soft, NT, ND   Lymphatics: no edema  Skin: no rash or petechaie  Neuro: non-focal, no gross deficits  Additional Findings: CVC site NT, no drainage.    Labs:   Lab Results   Component Value Date    WBC 2.9 (L) 07/18/2019    ANEU 0.7 (L) 07/18/2019    HGB 7.8 (L) 07/18/2019    HCT 23.5 (L) 07/18/2019    PLT 38 (LL) 07/18/2019     07/18/2019    POTASSIUM 3.9 07/18/2019    CHLORIDE 110 (H) 07/18/2019    CO2 27 07/18/2019    GLC 92 07/18/2019    BUN 14 07/18/2019    CR 0.89 07/18/2019    MAG 2.0 07/08/2019    INR 1.10 06/03/2019    BILITOTAL 0.4 07/18/2019    AST 25 07/18/2019    ALT 20 07/18/2019    ALKPHOS 72 07/18/2019    PROTTOTAL 6.7 (L) 07/18/2019    ALBUMIN 3.7  07/18/2019          ASSESSMENT/PLAN   Angel Yanez is a 58 yo man Day +62 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  BMT/MM:   Slow engraftment; cell dose was only 0.639x10^6. ([facility] [place] - known poor cell dose as failed chemo-mobilization 1/2019.)   - last dose GCSF 7/3 and 7/10, borderline      2.  HEME: Keep Hgb>8g/dL, plt >10.   - ongoing cytopenias - low cell dose with transplant + viral URI?  - Plts today 38k (was transfused on 7/15 prior to CMV vaccine)  - Hgb dropped to 7.8. Give 2 untis RBCs today  - ANC down to 0.7. Give GCSF 7/18  - Pt has RBC antibodies. Antibody work up completed 5/26.      3.  ID: Afebrile.    - +parainfluenza 3 (5/30). Ongoing cough, no hemoptysis.  Cough drops, tessalon pearls prn.   - prophy with ACV, Fluconazole, Pentamidine (7/15). Stopped prophy azithro 6/24 - asked him to resume this 7/18 due to intermittent neutropenia  - CMV neg 7/1. On CMV vaccine trial. ANC 1.0, study requires >1; discussed with Dr Mukherjee, appropriate to still give as planned     4.  GI:  No complaints.  - GI prophy- omeprazole.       5.  FEN/Renal: Creat, lytes wnl. Eating well.      6.  Mood:  Cont Paxil       RTC Tues for labs, provider and poss plt infusion    Alejandra Adorno PA-C  [phone]

## 2019-07-18 NOTE — NURSING NOTE
"Oncology Rooming Note    July 18, 2019 8:45 AM   Angel Yanez is a 59 year old male who presents for:    Chief Complaint   Patient presents with     Labs Only     cvc, heparin locked, vitals checked     RECHECK     Pt here for routine visit with Provider and Labs. Pt is s/p BMT for Multiple Myeloma.      Initial Vitals: /69   Pulse 101   Temp 98.7  F (37.1  C)   Resp 16   Wt 79.2 kg (174 lb 11.2 oz)   SpO2 98%   BMI 25.78 kg/m   Estimated body mass index is 25.78 kg/m  as calculated from the following:    Height as of 6/24/19: 1.753 m (5' 9.02\").    Weight as of this encounter: 79.2 kg (174 lb 11.2 oz). Body surface area is 1.96 meters squared.  No Pain (0) Comment: Data Unavailable   No LMP for male patient.  Allergies reviewed: Yes  Medications reviewed: Yes    Medications: Medication refills not needed today.  Pharmacy name entered into EPIC:    [facility] - [place] - [address] PHARMACY [facility] - [place], [place] - [address] [facility]    Clinical concerns: Pt with cold symptoms that he states he's had for about 7 weeks. Afebrile, VSS.        Radha Coker RN               "

## 2019-07-18 NOTE — PROGRESS NOTES
Infusion Nursing Note:  Angel Yanez presents today for add-on transfusion, G-CSF.    Patient seen by provider today: Yes: Alejandra Adorno   present during visit today: Not Applicable.    Note: Labs were monitored.    Intravenous Access:  Hsieh.    Treatment Conditions:  Patient received an add-on two units of red blood cells transfusion for a Hgb of 7.8.  He received G-CSF injection in his right arm for an ANC of 0.7.      Post Infusion Assessment:  Patient tolerated transfusions and injection without incident.       Discharge Plan:   Patient was ambulatory and discharged in stable condition accompanied by: self.    CLARA ELLSWORTH RN

## 2019-07-18 NOTE — NURSING NOTE
Chief Complaint   Patient presents with     Labs Only     cvc, heparin locked, vitals checked     Eloisa King RN on 7/18/2019 at 8:23 AM

## 2019-07-21 ENCOUNTER — APPOINTMENT (OUTPATIENT)
Dept: CT IMAGING | Facility: CLINIC | Age: 59
DRG: 872 | End: 2019-07-21
Attending: INTERNAL MEDICINE
Payer: COMMERCIAL

## 2019-07-21 ENCOUNTER — HOSPITAL ENCOUNTER (INPATIENT)
Facility: CLINIC | Age: 59
LOS: 4 days | Discharge: HOME | DRG: 872 | End: 2019-07-25
Attending: EMERGENCY MEDICINE | Admitting: INTERNAL MEDICINE
Payer: COMMERCIAL

## 2019-07-21 ENCOUNTER — TELEPHONE (OUTPATIENT)
Dept: ONCOLOGY | Facility: CLINIC | Age: 59
End: 2019-07-21

## 2019-07-21 ENCOUNTER — APPOINTMENT (OUTPATIENT)
Dept: GENERAL RADIOLOGY | Facility: CLINIC | Age: 59
DRG: 872 | End: 2019-07-21
Attending: EMERGENCY MEDICINE
Payer: COMMERCIAL

## 2019-07-21 DIAGNOSIS — R50.9 FEVER, UNSPECIFIED FEVER CAUSE: ICD-10-CM

## 2019-07-21 DIAGNOSIS — C90.00 MULTIPLE MYELOMA NOT HAVING ACHIEVED REMISSION (H): ICD-10-CM

## 2019-07-21 DIAGNOSIS — Z94.81 STATUS POST BONE MARROW TRANSPLANT (H): ICD-10-CM

## 2019-07-21 DIAGNOSIS — C90.01 MULTIPLE MYELOMA IN REMISSION (H): Primary | ICD-10-CM

## 2019-07-21 LAB
ALBUMIN SERPL-MCNC: 3.7 G/DL (ref 3.4–5)
ALBUMIN UR-MCNC: 10 MG/DL
ALP SERPL-CCNC: 82 U/L (ref 40–150)
ALT SERPL W P-5'-P-CCNC: 19 U/L (ref 0–70)
ANION GAP SERPL CALCULATED.3IONS-SCNC: 8 MMOL/L (ref 3–14)
APPEARANCE UR: CLEAR
AST SERPL W P-5'-P-CCNC: 22 U/L (ref 0–45)
BASOPHILS # BLD AUTO: 0 10E9/L (ref 0–0.2)
BASOPHILS NFR BLD AUTO: 0.3 %
BILIRUB SERPL-MCNC: 0.5 MG/DL (ref 0.2–1.3)
BILIRUB UR QL STRIP: NEGATIVE
BUN SERPL-MCNC: 17 MG/DL (ref 7–30)
CALCIUM SERPL-MCNC: 8.5 MG/DL (ref 8.5–10.1)
CHLORIDE SERPL-SCNC: 105 MMOL/L (ref 94–109)
CO2 BLDCOV-SCNC: 24 MMOL/L (ref 21–28)
CO2 SERPL-SCNC: 26 MMOL/L (ref 20–32)
COLOR UR AUTO: YELLOW
CREAT SERPL-MCNC: 1.01 MG/DL (ref 0.66–1.25)
DIFFERENTIAL METHOD BLD: ABNORMAL
EOSINOPHIL # BLD AUTO: 0 10E9/L (ref 0–0.7)
EOSINOPHIL NFR BLD AUTO: 0.6 %
ERYTHROCYTE [DISTWIDTH] IN BLOOD BY AUTOMATED COUNT: 21.9 % (ref 10–15)
GFR SERPL CREATININE-BSD FRML MDRD: 80 ML/MIN/{1.73_M2}
GLUCOSE SERPL-MCNC: 118 MG/DL (ref 70–99)
GLUCOSE UR STRIP-MCNC: NEGATIVE MG/DL
HCT VFR BLD AUTO: 29.2 % (ref 40–53)
HGB BLD-MCNC: 9.5 G/DL (ref 13.3–17.7)
HGB UR QL STRIP: NEGATIVE
IMM GRANULOCYTES # BLD: 0 10E9/L (ref 0–0.4)
IMM GRANULOCYTES NFR BLD: 0.3 %
KETONES UR STRIP-MCNC: NEGATIVE MG/DL
LACTATE BLD-SCNC: 1.2 MMOL/L (ref 0.7–2.1)
LACTATE BLD-SCNC: 1.4 MMOL/L (ref 0.7–2)
LEUKOCYTE ESTERASE UR QL STRIP: NEGATIVE
LYMPHOCYTES # BLD AUTO: 1 10E9/L (ref 0.8–5.3)
LYMPHOCYTES NFR BLD AUTO: 14.3 %
MAGNESIUM SERPL-MCNC: 1.8 MG/DL (ref 1.6–2.3)
MCH RBC QN AUTO: 32.3 PG (ref 26.5–33)
MCHC RBC AUTO-ENTMCNC: 32.5 G/DL (ref 31.5–36.5)
MCV RBC AUTO: 99 FL (ref 78–100)
MONOCYTES # BLD AUTO: 0.6 10E9/L (ref 0–1.3)
MONOCYTES NFR BLD AUTO: 8.7 %
MUCOUS THREADS #/AREA URNS LPF: PRESENT /LPF
NEUTROPHILS # BLD AUTO: 5.5 10E9/L (ref 1.6–8.3)
NEUTROPHILS NFR BLD AUTO: 75.8 %
NITRATE UR QL: NEGATIVE
NRBC # BLD AUTO: 0 10*3/UL
NRBC BLD AUTO-RTO: 0 /100
PCO2 BLDV: 40 MM HG (ref 40–50)
PH BLDV: 7.4 PH (ref 7.32–7.43)
PH UR STRIP: 6 PH (ref 5–7)
PHOSPHATE SERPL-MCNC: 2.5 MG/DL (ref 2.5–4.5)
PLATELET # BLD AUTO: 23 10E9/L (ref 150–450)
PO2 BLDV: 33 MM HG (ref 25–47)
POTASSIUM SERPL-SCNC: 3.5 MMOL/L (ref 3.4–5.3)
PROT SERPL-MCNC: 6.6 G/DL (ref 6.8–8.8)
RBC # BLD AUTO: 2.94 10E12/L (ref 4.4–5.9)
RBC #/AREA URNS AUTO: 2 /HPF (ref 0–2)
SAO2 % BLDV FROM PO2: 63 %
SODIUM SERPL-SCNC: 138 MMOL/L (ref 133–144)
SOURCE: ABNORMAL
SP GR UR STRIP: 1.02 (ref 1–1.03)
TRANS CELLS #/AREA URNS HPF: <1 /HPF (ref 0–1)
UROBILINOGEN UR STRIP-MCNC: NORMAL MG/DL (ref 0–2)
WBC # BLD AUTO: 7.3 10E9/L (ref 4–11)
WBC #/AREA URNS AUTO: 1 /HPF (ref 0–5)

## 2019-07-21 PROCEDURE — 83605 ASSAY OF LACTIC ACID: CPT

## 2019-07-21 PROCEDURE — 87186 SC STD MICRODIL/AGAR DIL: CPT | Performed by: EMERGENCY MEDICINE

## 2019-07-21 PROCEDURE — 70486 CT MAXILLOFACIAL W/O DYE: CPT

## 2019-07-21 PROCEDURE — 99285 EMERGENCY DEPT VISIT HI MDM: CPT | Mod: 25 | Performed by: EMERGENCY MEDICINE

## 2019-07-21 PROCEDURE — 87077 CULTURE AEROBIC IDENTIFY: CPT | Performed by: EMERGENCY MEDICINE

## 2019-07-21 PROCEDURE — 83735 ASSAY OF MAGNESIUM: CPT | Performed by: EMERGENCY MEDICINE

## 2019-07-21 PROCEDURE — 87040 BLOOD CULTURE FOR BACTERIA: CPT | Performed by: EMERGENCY MEDICINE

## 2019-07-21 PROCEDURE — 83605 ASSAY OF LACTIC ACID: CPT | Performed by: EMERGENCY MEDICINE

## 2019-07-21 PROCEDURE — 96367 TX/PROPH/DG ADDL SEQ IV INF: CPT | Performed by: EMERGENCY MEDICINE

## 2019-07-21 PROCEDURE — 87086 URINE CULTURE/COLONY COUNT: CPT | Performed by: EMERGENCY MEDICINE

## 2019-07-21 PROCEDURE — 71250 CT THORAX DX C-: CPT

## 2019-07-21 PROCEDURE — 20600000 ZZH R&B BMT

## 2019-07-21 PROCEDURE — 85025 COMPLETE CBC W/AUTO DIFF WBC: CPT | Performed by: EMERGENCY MEDICINE

## 2019-07-21 PROCEDURE — 99223 1ST HOSP IP/OBS HIGH 75: CPT | Mod: AI | Performed by: INTERNAL MEDICINE

## 2019-07-21 PROCEDURE — 93005 ELECTROCARDIOGRAM TRACING: CPT | Performed by: EMERGENCY MEDICINE

## 2019-07-21 PROCEDURE — 71046 X-RAY EXAM CHEST 2 VIEWS: CPT

## 2019-07-21 PROCEDURE — 82803 BLOOD GASES ANY COMBINATION: CPT

## 2019-07-21 PROCEDURE — 81001 URINALYSIS AUTO W/SCOPE: CPT | Performed by: EMERGENCY MEDICINE

## 2019-07-21 PROCEDURE — 25000128 H RX IP 250 OP 636: Performed by: EMERGENCY MEDICINE

## 2019-07-21 PROCEDURE — 96365 THER/PROPH/DIAG IV INF INIT: CPT | Performed by: EMERGENCY MEDICINE

## 2019-07-21 PROCEDURE — 25800030 ZZH RX IP 258 OP 636: Performed by: EMERGENCY MEDICINE

## 2019-07-21 PROCEDURE — 93010 ELECTROCARDIOGRAM REPORT: CPT | Mod: Z6 | Performed by: EMERGENCY MEDICINE

## 2019-07-21 PROCEDURE — 84100 ASSAY OF PHOSPHORUS: CPT | Performed by: EMERGENCY MEDICINE

## 2019-07-21 PROCEDURE — 80053 COMPREHEN METABOLIC PANEL: CPT | Performed by: EMERGENCY MEDICINE

## 2019-07-21 PROCEDURE — 87800 DETECT AGNT MULT DNA DIREC: CPT | Performed by: EMERGENCY MEDICINE

## 2019-07-21 RX ORDER — MAGNESIUM SULFATE HEPTAHYDRATE 40 MG/ML
4 INJECTION, SOLUTION INTRAVENOUS EVERY 4 HOURS PRN
Status: DISCONTINUED | OUTPATIENT
Start: 2019-07-21 | End: 2019-07-25 | Stop reason: HOSPADM

## 2019-07-21 RX ORDER — PROCHLORPERAZINE MALEATE 5 MG
5-10 TABLET ORAL EVERY 6 HOURS PRN
Status: DISCONTINUED | OUTPATIENT
Start: 2019-07-21 | End: 2019-07-25 | Stop reason: HOSPADM

## 2019-07-21 RX ORDER — ACETAMINOPHEN 325 MG/1
325-650 TABLET ORAL EVERY 4 HOURS PRN
Status: DISCONTINUED | OUTPATIENT
Start: 2019-07-21 | End: 2019-07-25 | Stop reason: HOSPADM

## 2019-07-21 RX ORDER — SODIUM CHLORIDE 9 MG/ML
1000 INJECTION, SOLUTION INTRAVENOUS CONTINUOUS
Status: DISCONTINUED | OUTPATIENT
Start: 2019-07-21 | End: 2019-07-21

## 2019-07-21 RX ORDER — PAROXETINE 20 MG/1
20 TABLET, FILM COATED ORAL EVERY MORNING
Status: DISCONTINUED | OUTPATIENT
Start: 2019-07-22 | End: 2019-07-25 | Stop reason: HOSPADM

## 2019-07-21 RX ORDER — BENZONATATE 100 MG/1
100 CAPSULE ORAL 3 TIMES DAILY PRN
Status: DISCONTINUED | OUTPATIENT
Start: 2019-07-21 | End: 2019-07-21

## 2019-07-21 RX ORDER — BENZONATATE 100 MG/1
100 CAPSULE ORAL 3 TIMES DAILY PRN
Status: DISCONTINUED | OUTPATIENT
Start: 2019-07-21 | End: 2019-07-25 | Stop reason: HOSPADM

## 2019-07-21 RX ORDER — SODIUM CHLORIDE 9 MG/ML
1000 INJECTION, SOLUTION INTRAVENOUS CONTINUOUS
Status: DISCONTINUED | OUTPATIENT
Start: 2019-07-21 | End: 2019-07-23

## 2019-07-21 RX ORDER — POTASSIUM CL/LIDO/0.9 % NACL 10MEQ/0.1L
10 INTRAVENOUS SOLUTION, PIGGYBACK (ML) INTRAVENOUS
Status: DISCONTINUED | OUTPATIENT
Start: 2019-07-21 | End: 2019-07-25 | Stop reason: HOSPADM

## 2019-07-21 RX ORDER — CEFEPIME HYDROCHLORIDE 1 G/1
1 INJECTION, POWDER, FOR SOLUTION INTRAMUSCULAR; INTRAVENOUS ONCE
Status: COMPLETED | OUTPATIENT
Start: 2019-07-21 | End: 2019-07-21

## 2019-07-21 RX ORDER — CODEINE PHOSPHATE AND GUAIFENESIN 10; 100 MG/5ML; MG/5ML
5 SOLUTION ORAL EVERY 4 HOURS
Status: DISCONTINUED | OUTPATIENT
Start: 2019-07-21 | End: 2019-07-21

## 2019-07-21 RX ORDER — POTASSIUM CHLORIDE 7.45 MG/ML
10 INJECTION INTRAVENOUS
Status: DISCONTINUED | OUTPATIENT
Start: 2019-07-21 | End: 2019-07-25 | Stop reason: HOSPADM

## 2019-07-21 RX ORDER — POTASSIUM CHLORIDE 750 MG/1
20-40 TABLET, EXTENDED RELEASE ORAL
Status: DISCONTINUED | OUTPATIENT
Start: 2019-07-21 | End: 2019-07-25 | Stop reason: HOSPADM

## 2019-07-21 RX ORDER — IPRATROPIUM BROMIDE AND ALBUTEROL SULFATE 2.5; .5 MG/3ML; MG/3ML
3 SOLUTION RESPIRATORY (INHALATION) EVERY 4 HOURS PRN
Status: DISCONTINUED | OUTPATIENT
Start: 2019-07-21 | End: 2019-07-25 | Stop reason: HOSPADM

## 2019-07-21 RX ORDER — FLUCONAZOLE 100 MG/1
100 TABLET ORAL DAILY
Status: DISCONTINUED | OUTPATIENT
Start: 2019-07-22 | End: 2019-07-25 | Stop reason: HOSPADM

## 2019-07-21 RX ORDER — ACETAMINOPHEN 325 MG/1
325-650 TABLET ORAL EVERY 4 HOURS PRN
Status: DISCONTINUED | OUTPATIENT
Start: 2019-07-21 | End: 2019-07-21

## 2019-07-21 RX ORDER — POTASSIUM CHLORIDE 1.5 G/1.58G
20-40 POWDER, FOR SOLUTION ORAL
Status: DISCONTINUED | OUTPATIENT
Start: 2019-07-21 | End: 2019-07-25 | Stop reason: HOSPADM

## 2019-07-21 RX ORDER — POTASSIUM CHLORIDE 29.8 MG/ML
20 INJECTION INTRAVENOUS
Status: DISCONTINUED | OUTPATIENT
Start: 2019-07-21 | End: 2019-07-25 | Stop reason: HOSPADM

## 2019-07-21 RX ORDER — CODEINE PHOSPHATE AND GUAIFENESIN 10; 100 MG/5ML; MG/5ML
5-10 SOLUTION ORAL EVERY 4 HOURS PRN
Status: DISCONTINUED | OUTPATIENT
Start: 2019-07-21 | End: 2019-07-25 | Stop reason: HOSPADM

## 2019-07-21 RX ORDER — ACYCLOVIR 800 MG/1
800 TABLET ORAL 2 TIMES DAILY
Status: DISCONTINUED | OUTPATIENT
Start: 2019-07-21 | End: 2019-07-25 | Stop reason: HOSPADM

## 2019-07-21 RX ORDER — LIDOCAINE 40 MG/G
CREAM TOPICAL
Status: DISCONTINUED | OUTPATIENT
Start: 2019-07-21 | End: 2019-07-25 | Stop reason: DRUGHIGH

## 2019-07-21 RX ADMIN — VANCOMYCIN HYDROCHLORIDE 1250 MG: 10 INJECTION, POWDER, LYOPHILIZED, FOR SOLUTION INTRAVENOUS at 21:22

## 2019-07-21 RX ADMIN — SODIUM CHLORIDE 1000 ML: 9 INJECTION, SOLUTION INTRAVENOUS at 20:53

## 2019-07-21 RX ADMIN — CEFEPIME HYDROCHLORIDE 1 G: 1 INJECTION, POWDER, FOR SOLUTION INTRAMUSCULAR; INTRAVENOUS at 20:12

## 2019-07-21 RX ADMIN — SODIUM CHLORIDE 1000 ML: 9 INJECTION, SOLUTION INTRAVENOUS at 20:12

## 2019-07-21 ASSESSMENT — ENCOUNTER SYMPTOMS
HEADACHES: 0
COLOR CHANGE: 0
EYE REDNESS: 0
NECK STIFFNESS: 0
FEVER: 1
ARTHRALGIAS: 0
ABDOMINAL PAIN: 0
DIFFICULTY URINATING: 0
CHILLS: 1
SHORTNESS OF BREATH: 0
CONFUSION: 0
COUGH: 1

## 2019-07-21 NOTE — TELEPHONE ENCOUNTER
Returned call from Mr. Yanez.  He reports he felt generally unwell around 5PM today, checked his temp which was 100.2.  He then rechecked around 6PM  and his temp was 102.2.  Is feeling slightly better now, does have a persistent cough for 8+ weeks but no other acute concerns at the moment.    On chart review patient is BMT approximately D+65 with recent neutropenia.    Advised Mr. Yanez that I'm concerned he has a serious infection and needs to go to the ED immediately.  He was agreeable and plans to leave with his SO now.

## 2019-07-22 LAB
ANION GAP SERPL CALCULATED.3IONS-SCNC: 8 MMOL/L (ref 3–14)
APTT PPP: 40 SEC (ref 22–37)
APTT PPP: 74 SEC (ref 22–37)
BACTERIA SPEC CULT: NO GROWTH
BASOPHILS # BLD AUTO: 0 10E9/L (ref 0–0.2)
BASOPHILS NFR BLD AUTO: 0 %
BUN SERPL-MCNC: 10 MG/DL (ref 7–30)
CALCIUM SERPL-MCNC: 7.9 MG/DL (ref 8.5–10.1)
CHLORIDE SERPL-SCNC: 110 MMOL/L (ref 94–109)
CO2 SERPL-SCNC: 24 MMOL/L (ref 20–32)
CREAT SERPL-MCNC: 0.84 MG/DL (ref 0.66–1.25)
DIFFERENTIAL METHOD BLD: ABNORMAL
EOSINOPHIL # BLD AUTO: 0 10E9/L (ref 0–0.7)
EOSINOPHIL NFR BLD AUTO: 0.6 %
ERYTHROCYTE [DISTWIDTH] IN BLOOD BY AUTOMATED COUNT: 21.4 % (ref 10–15)
FLUAV H1 2009 PAND RNA SPEC QL NAA+PROBE: NEGATIVE
FLUAV H1 RNA SPEC QL NAA+PROBE: NEGATIVE
FLUAV H3 RNA SPEC QL NAA+PROBE: NEGATIVE
FLUAV RNA SPEC QL NAA+PROBE: NEGATIVE
FLUBV RNA SPEC QL NAA+PROBE: NEGATIVE
GFR SERPL CREATININE-BSD FRML MDRD: >90 ML/MIN/{1.73_M2}
GLUCOSE SERPL-MCNC: 92 MG/DL (ref 70–99)
HADV DNA SPEC QL NAA+PROBE: NEGATIVE
HADV DNA SPEC QL NAA+PROBE: NEGATIVE
HCT VFR BLD AUTO: 26.5 % (ref 40–53)
HGB BLD-MCNC: 8.4 G/DL (ref 13.3–17.7)
HMPV RNA SPEC QL NAA+PROBE: NEGATIVE
HPIV1 RNA SPEC QL NAA+PROBE: NEGATIVE
HPIV2 RNA SPEC QL NAA+PROBE: NEGATIVE
HPIV3 RNA SPEC QL NAA+PROBE: NEGATIVE
IMM GRANULOCYTES # BLD: 0 10E9/L (ref 0–0.4)
IMM GRANULOCYTES NFR BLD: 0.4 %
INR PPP: 1.16 (ref 0.86–1.14)
INTERPRETATION ECG - MUSE: NORMAL
LYMPHOCYTES # BLD AUTO: 0.9 10E9/L (ref 0.8–5.3)
LYMPHOCYTES NFR BLD AUTO: 19.7 %
Lab: NORMAL
MCH RBC QN AUTO: 31.6 PG (ref 26.5–33)
MCHC RBC AUTO-ENTMCNC: 31.7 G/DL (ref 31.5–36.5)
MCV RBC AUTO: 100 FL (ref 78–100)
MICROBIOLOGIST REVIEW: ABNORMAL
MONOCYTES # BLD AUTO: 0.6 10E9/L (ref 0–1.3)
MONOCYTES NFR BLD AUTO: 13.6 %
NEUTROPHILS # BLD AUTO: 3.1 10E9/L (ref 1.6–8.3)
NEUTROPHILS NFR BLD AUTO: 65.7 %
NRBC # BLD AUTO: 0 10*3/UL
NRBC BLD AUTO-RTO: 0 /100
PLATELET # BLD AUTO: 17 10E9/L (ref 150–450)
PLATELET # BLD AUTO: 21 10E9/L (ref 150–450)
POTASSIUM SERPL-SCNC: 3.5 MMOL/L (ref 3.4–5.3)
RBC # BLD AUTO: 2.66 10E12/L (ref 4.4–5.9)
RHINOVIRUS RNA SPEC QL NAA+PROBE: POSITIVE
RSV RNA SPEC QL NAA+PROBE: NEGATIVE
RSV RNA SPEC QL NAA+PROBE: NEGATIVE
SODIUM SERPL-SCNC: 142 MMOL/L (ref 133–144)
SPECIMEN SOURCE: ABNORMAL
SPECIMEN SOURCE: NORMAL
WBC # BLD AUTO: 4.7 10E9/L (ref 4–11)

## 2019-07-22 PROCEDURE — 20600000 ZZH R&B BMT

## 2019-07-22 PROCEDURE — 85610 PROTHROMBIN TIME: CPT | Performed by: INTERNAL MEDICINE

## 2019-07-22 PROCEDURE — 86901 BLOOD TYPING SEROLOGIC RH(D): CPT | Performed by: INTERNAL MEDICINE

## 2019-07-22 PROCEDURE — 87081 CULTURE SCREEN ONLY: CPT | Performed by: INTERNAL MEDICINE

## 2019-07-22 PROCEDURE — 25000132 ZZH RX MED GY IP 250 OP 250 PS 637: Performed by: INTERNAL MEDICINE

## 2019-07-22 PROCEDURE — 86902 BLOOD TYPE ANTIGEN DONOR EA: CPT | Performed by: INTERNAL MEDICINE

## 2019-07-22 PROCEDURE — 25800030 ZZH RX IP 258 OP 636: Performed by: INTERNAL MEDICINE

## 2019-07-22 PROCEDURE — 85730 THROMBOPLASTIN TIME PARTIAL: CPT | Performed by: PHYSICIAN ASSISTANT

## 2019-07-22 PROCEDURE — 80048 BASIC METABOLIC PNL TOTAL CA: CPT | Performed by: INTERNAL MEDICINE

## 2019-07-22 PROCEDURE — 25000128 H RX IP 250 OP 636: Performed by: PHYSICIAN ASSISTANT

## 2019-07-22 PROCEDURE — 87077 CULTURE AEROBIC IDENTIFY: CPT | Performed by: PHYSICIAN ASSISTANT

## 2019-07-22 PROCEDURE — 85025 COMPLETE CBC W/AUTO DIFF WBC: CPT | Performed by: INTERNAL MEDICINE

## 2019-07-22 PROCEDURE — 25000128 H RX IP 250 OP 636: Performed by: INTERNAL MEDICINE

## 2019-07-22 PROCEDURE — 85730 THROMBOPLASTIN TIME PARTIAL: CPT | Performed by: INTERNAL MEDICINE

## 2019-07-22 PROCEDURE — 86850 RBC ANTIBODY SCREEN: CPT | Performed by: INTERNAL MEDICINE

## 2019-07-22 PROCEDURE — 87633 RESP VIRUS 12-25 TARGETS: CPT | Performed by: INTERNAL MEDICINE

## 2019-07-22 PROCEDURE — 0JPT3XZ REMOVAL OF TUNNELED VASCULAR ACCESS DEVICE FROM TRUNK SUBCUTANEOUS TISSUE AND FASCIA, PERCUTANEOUS APPROACH: ICD-10-PCS | Performed by: PHYSICIAN ASSISTANT

## 2019-07-22 PROCEDURE — 05PYX3Z REMOVAL OF INFUSION DEVICE FROM UPPER VEIN, EXTERNAL APPROACH: ICD-10-PCS | Performed by: PHYSICIAN ASSISTANT

## 2019-07-22 PROCEDURE — 87040 BLOOD CULTURE FOR BACTERIA: CPT | Performed by: PHYSICIAN ASSISTANT

## 2019-07-22 PROCEDURE — 86900 BLOOD TYPING SEROLOGIC ABO: CPT | Performed by: INTERNAL MEDICINE

## 2019-07-22 PROCEDURE — 25800030 ZZH RX IP 258 OP 636: Performed by: PHYSICIAN ASSISTANT

## 2019-07-22 PROCEDURE — 85049 AUTOMATED PLATELET COUNT: CPT | Performed by: PHYSICIAN ASSISTANT

## 2019-07-22 RX ORDER — HEPARIN SODIUM,PORCINE 10 UNIT/ML
3 VIAL (ML) INTRAVENOUS
Status: DISCONTINUED | OUTPATIENT
Start: 2019-07-22 | End: 2019-07-25 | Stop reason: HOSPADM

## 2019-07-22 RX ORDER — DEXTROSE MONOHYDRATE 25 G/50ML
25-50 INJECTION, SOLUTION INTRAVENOUS
Status: CANCELLED | OUTPATIENT
Start: 2019-07-22

## 2019-07-22 RX ORDER — NICOTINE POLACRILEX 4 MG
15-30 LOZENGE BUCCAL
Status: CANCELLED | OUTPATIENT
Start: 2019-07-22

## 2019-07-22 RX ADMIN — CEFEPIME HYDROCHLORIDE 2 G: 2 INJECTION, POWDER, FOR SOLUTION INTRAVENOUS at 13:16

## 2019-07-22 RX ADMIN — CEFEPIME HYDROCHLORIDE 2 G: 2 INJECTION, POWDER, FOR SOLUTION INTRAVENOUS at 19:51

## 2019-07-22 RX ADMIN — PAROXETINE HYDROCHLORIDE HEMIHYDRATE 20 MG: 20 TABLET, FILM COATED ORAL at 08:28

## 2019-07-22 RX ADMIN — CEFEPIME HYDROCHLORIDE 2 G: 2 INJECTION, POWDER, FOR SOLUTION INTRAVENOUS at 05:12

## 2019-07-22 RX ADMIN — SODIUM CHLORIDE 1000 ML: 9 INJECTION, SOLUTION INTRAVENOUS at 10:40

## 2019-07-22 RX ADMIN — Medication 3 ML: at 05:18

## 2019-07-22 RX ADMIN — ACYCLOVIR 800 MG: 800 TABLET ORAL at 19:51

## 2019-07-22 RX ADMIN — SODIUM CHLORIDE 1000 ML: 9 INJECTION, SOLUTION INTRAVENOUS at 21:57

## 2019-07-22 RX ADMIN — ACYCLOVIR 800 MG: 800 TABLET ORAL at 08:28

## 2019-07-22 RX ADMIN — SODIUM CHLORIDE 1000 ML: 9 INJECTION, SOLUTION INTRAVENOUS at 00:17

## 2019-07-22 RX ADMIN — Medication 3 ML: at 13:25

## 2019-07-22 RX ADMIN — TOBRAMYCIN SULFATE 400 MG: 40 INJECTION, SOLUTION INTRAMUSCULAR; INTRAVENOUS at 14:34

## 2019-07-22 RX ADMIN — FLUCONAZOLE 100 MG: 100 TABLET ORAL at 08:28

## 2019-07-22 RX ADMIN — MELATONIN 2000 UNITS: at 08:28

## 2019-07-22 RX ADMIN — Medication 1200 MG: at 08:28

## 2019-07-22 RX ADMIN — ACYCLOVIR 800 MG: 800 TABLET ORAL at 00:16

## 2019-07-22 ASSESSMENT — MIFFLIN-ST. JEOR
SCORE: 1612.76
SCORE: 1594.16

## 2019-07-22 ASSESSMENT — ACTIVITIES OF DAILY LIVING (ADL)
ADLS_ACUITY_SCORE: 10

## 2019-07-22 NOTE — ED NOTES
[facility], [place]   ED Nurse to Floor Handoff     Angel Yanez is a 59 year old male who speaks English and lives with a spouse,  in a home  They arrived in the ED by car from home    ED Chief Complaint: Fever    ED Dx;   Final diagnoses:   Fever, unspecified fever cause         Needed?: No    Allergies:   Allergies   Allergen Reactions     Chlorhexidine Itching     Avalide Hives     Chloroxylenol Rash     Technicare solution     Lorazepam Hives     Other reaction(s): Hives       Moxifloxacin Hives   .  Past Medical Hx:   Past Medical History:   Diagnosis Date     GERD (gastroesophageal reflux disease)      H/O autologous stem cell transplant (H) 02/2005     Hyperlipidemia      Multiple myeloma (H) 2004     PONV (postoperative nausea and vomiting)      Psoriasis      Psoriatic arthritis (H)       Baseline Mental status: WDL  Current Mental Status changes: at basesline    Infection present or suspected this encounter: cultures pending  Sepsis suspected: Yes  Isolation type: No active isolations     Activity level - Baseline/Home:  Independent  Activity Level - Current:   Independent    Bariatric equipment needed?: No    In the ED these meds were given:   Medications   lidocaine 1 % 1 mL (has no administration in time range)   lidocaine (LMX4) cream (has no administration in time range)   sodium chloride (PF) 0.9% PF flush 3 mL (has no administration in time range)   sodium chloride (PF) 0.9% PF flush 3 mL (3 mLs Intravenous Not Given 7/21/19 2053)   0.9% sodium chloride BOLUS (0 mLs Intravenous Stopped 7/21/19 2053)     Followed by   sodium chloride 0.9% infusion (has no administration in time range)   vancomycin 1250 mg in 0.9% NaCl 250 mL intermittent infusion 1,250 mg (1,250 mg Intravenous Given 7/21/19 2122)   0.9% sodium chloride BOLUS (0 mLs Intravenous Stopped 7/21/19 2127)   ceFEPIme (MAXIPIME) 1g vial to attach to  ml bag for ADULTS or NS 50 ml bag for PEDS  (0 g Intravenous Stopped 7/21/19 2053)       Drips running?  Yes    Home pump  No    Current LDAs  CVC Double Lumen 05/16/19 Right Internal jugular (Active)   Number of days: 66       Rash 01/22/19 1300 anterior chest (Active)   Number of days: 180       Rash 06/01/19 2000 back macular (Active)   Number of days: 50       Incision/Surgical Site 05/14/19 Bilateral Hip (Active)   Number of days: 68       Labs results:   Labs Ordered and Resulted from Time of ED Arrival Up to the Time of Departure from the ED   COMPREHENSIVE METABOLIC PANEL - Abnormal; Notable for the following components:       Result Value    Glucose 118 (*)     Protein Total 6.6 (*)     All other components within normal limits   CBC WITH PLATELETS DIFFERENTIAL - Abnormal; Notable for the following components:    RBC Count 2.94 (*)     Hemoglobin 9.5 (*)     Hematocrit 29.2 (*)     RDW 21.9 (*)     Platelet Count 23 (*)     All other components within normal limits   ROUTINE UA WITH MICROSCOPIC - Abnormal; Notable for the following components:    Protein Albumin Urine 10 (*)     Mucous Urine Present (*)     All other components within normal limits   LACTIC ACID WHOLE BLOOD   PULSE OXIMETRY NURSING   CARDIAC CONTINUOUS MONITORING   ISTAT CG4 GASES LACTATE ROSA ELENA NURSING POCT   PERIPHERAL IV CATHETER   NURSING DRAW AND HOLD   NURSING DRAW AND HOLD   NURSING DRAW AND HOLD   ISTAT  GASES LACTATE ROSA ELENA POCT   URINE CULTURE AEROBIC BACTERIAL   BLOOD CULTURE   BLOOD CULTURE       Imaging Studies:   Recent Results (from the past 24 hour(s))   XR Chest 2 Views    Narrative    This is a preliminary resident report. Full report will follow.      Impression    IMPRESSION:   Clear lungs.       Recent vital signs:   /73   Pulse 94   Temp 102.6  F (39.2  C) (Oral)   Wt 80.7 kg (178 lb)   SpO2 94%   BMI 26.27 kg/m      F[place] Coma Scale Score: 15 (07/21/19 1929)       Cardiac Rhythm: Normal Sinus  Pt needs tele? No  Skin/wound Issues: None    Code Status:  Full Code    Pain control: good    Nausea control: good    Abnormal labs/tests/findings requiring intervention: ASHLEY PERRY    Family present during ED course? Yes   Family Comments/Social Situation comments: spouse at bedside    Tasks needing completion: None    Xu Hickman RN  [facility] -- [phone] [facility] [place]  [phone] [facility]

## 2019-07-22 NOTE — CONSULTS
Patient is on IR schedule 7/23/2019 for a RIJ tunneled CVC removal  Labs ordered for procedure. Labs must be corrected  Plts 50,000 or better INR 1.8 or better   No NPO required.   Consent will be done prior to procedure.   placed 5/16, positive blood cultures Plts are 17,000    Please contact the IR charge RN at [phone] for estimated time of procedure.     Discussed with Dr. Isaiah Irwin IR RPA  [phone] Call pager  [phone] pager

## 2019-07-22 NOTE — H&P
Heme/Onc History and Physical    Angel Yanez MRN# [id number]   Age: 59 year old YOB: 1960     Date of Admission:  7/21/2019    Primary care provider: Ayana Love          Assessment and Plan:   A/P  Angel Yanez is a 60 yo male Day +65 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     Plan:  1.  BMT/MM:   - Slow engraftment secondary to low nucleated and CD34 positive cell dose; cell dose was only 0.639x10^6 for stem cell transplant on 5/17/19 ([facility] - known poor cell dose as failed chemo-mobilization 1/2019).   - GCSF 7/3, 7/10, 7/18, borderline.   - Patient denies currently taking Claritin.  - BMBx 6/11/19 was negative for recurrent/persistent plasma cell neoplasm. Restage per protocol.       2.  HEME:   - Ongoing cytopenias - might be related to low cell dose transplant.   - Keep Hgb>8g/dL, plt >10.   - No pre-meds.   - Confirmed with blood bank that patient has no clinically significant antibodies and the prior positive screen from 5/24 was presumably due to his being on daratumumab.      3.  ID/PULM:   #Recurrent fevers  #Persistent dry cough  - Admitted for febrile neutropenia 5/26-5/28 with normal work-up and discharged on daily IV ceftriaxone. 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 Now readmitted for fevers (Tmax at home 102.1) with rhinorrhea/dry cough persistent since diagnosed with PIV3. WBC and ANC increased to 7.3 and 5.5 on admn compared to 2.9 and 0.7 on 7/18.   - Continue cefepime. No current indication to continue vancomycin initiated in ED.   - Ordered CT chest (CXR was negative but CT would have greater sensitivity in the setting of persistent cough and neutropenic fevers). Further w/u pending CTC results.   - Ordered CT sinus to assess interval change in pansinusitis noted 5/30/19.   - Ordered RVP.  - Follow BCx and UCx.   - Schedule Robitussin AC 5 ml Q4H. Benzonatate TID PRN cough.  "    #Miscellaneous  - Continue prophylaxis with ACV, fluconazole and pentamidine (7/15).   - Discontinued ppx azithromycin given initiation of broad-spectrum ABX. Started on azithromycin ppx on 6/5 through engraftment which was stopped 6/24 but resumed 7/18 due to intermittent neutropenia (moxifloxacin allergy).   - On CMV vaccine trial. Received D+56 CMV IM vaccine on 7/15. CMV negative, most recently from 7/15.      4.  GI:   - No complaints.  - Patient currently not taking omeprazole.       5.  FEN/Renal:   - Monitor Cr. Slightly increased to 1.01 on admission from 0.89 on 7/18/19, likely due to insensible losses from fever.   - Replace electrolytes per replacement protocol.  - IVF  ml/hr.    6. Mood:   - Cont paroxetine 20 mg daily.       Rudolph Hernandez  07/21/2019              Chief Complaint:   Recurrent fevers         History of Present Illness:   Angel Yanez is a 58 yo male Day +65 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     Patient has been having persistent cough since he was diagnosed with PIV3 on 5/30. It comes in bouts present throughout the day and is exacerbated by talking and \"sugary drinks\". No overall improvement since it first started in May. It continues to be a predominantly dry cough with no significant expectoration. He denies night-time awakenings due to cough. Cough causes SOB, MSK chest pain and generally makes him uncomfortable. He has been taking ricola cough drops, nyquil and mucinex but without relief. No history of asthma or COPD. History of 10 pack-year smoking but quit 30 years ago. He also has had intermittent runny nose. As soon as he goes out of his home, he would have an episode of cough and runny nose. Wife runs a day care in their home and she might have been exposed to sick children. He has also noted intermittent tachycardia on vital checks during routine clinic visits.     Today, he incidentally noted a temp of 100.3 at 5 PM " increased to 102.2 on recheck 30 minutes to 1 hour later. Patient and wife called the fellow on call and were advised to come to the ED for evaluation.     Apart from the cough and runny nose as detailed above, patient is generally asymptomatic. No headache or other CNS symptoms. No sinus pains, itchy watery eyes, sore throat, postnasal drip, abdominal pain, nausea, vomiting, diarrhea, urinary symptoms, skin rashes (hx of psoariasis). He had problems with mucositis and mouth ulcers in the immediate post-transplant period but not any more. Right chest Hsieh catheter site is unremarkable.     In the ED, he was started on vancomycin and cefepime and given NS bolus x 1L f/b 125 ml/hr.         Review of Systems:     14-point review of systems was otherwise negative except as noted in HPI.          Past Medical History:   Past medical history was reviewed.   Past Medical History:   Diagnosis Date     GERD (gastroesophageal reflux disease)      H/O autologous stem cell transplant (H) 02/2005     Hyperlipidemia      Multiple myeloma (H) 2004     PONV (postoperative nausea and vomiting)      Psoriasis      Psoriatic arthritis (H)              Past Surgical History:   Past surgical history was reviewed.   Past Surgical History:   Procedure Laterality Date     ARTHROPLASTY HIP       COLONOSCOPY       HERNIA REPAIR       IR CVC TUNNEL PLACEMENT > 5 YRS OF AGE  1/22/2019     IR CVC TUNNEL PLACEMENT > 5 YRS OF AGE  5/16/2019     IR CVC TUNNEL REMOVAL LEFT  2/20/2019     IR FOLLOW UP VISIT OUTPATIENT  1/24/2019     ORTHOPEDIC SURGERY       PROCURE BONE MARROW N/A 5/14/2019    Procedure: Bone Marrow [place];  Surgeon: Antwan Erickson MD;  Location: [facility] OR     TRANSPLANT               Social History:   Social history was reviewed.   Social History     Tobacco Use     Smoking status: Former Smoker     Smokeless tobacco: Never Used   Substance Use Topics     Alcohol use: Yes     Comment: occasionaly             Family  History:   Family history was reviewed.  Family History   Problem Relation Age of Onset     Diabetes Mother              Allergies:     Allergies   Allergen Reactions     Chlorhexidine Itching     Avalide Hives     Chloroxylenol Rash     Technicare solution     Lorazepam Hives     Other reaction(s): Hives       Moxifloxacin Hives             Medications:   Medications Reviewed.   Current Facility-Administered Medications   Medication     0.9% sodium chloride BOLUS     lidocaine (LMX4) cream     lidocaine 1 % 1 mL     sodium chloride (PF) 0.9% PF flush 3 mL     sodium chloride (PF) 0.9% PF flush 3 mL     sodium chloride 0.9% infusion     Current Outpatient Medications   Medication Sig     acyclovir (ZOVIRAX) 800 MG tablet Take 1 tablet (800 mg) by mouth 2 times daily     azithromycin (ZITHROMAX) 250 MG tablet Take 1 tablet (250 mg) by mouth daily     benzonatate (TESSALON) 100 MG capsule Take 1 capsule (100 mg) by mouth 3 times daily as needed for cough     calcium carbonate (CALCIUM CARBONATE) 600 MG tablet Take 2 tablets by mouth daily      Cholecalciferol (VITAMIN D3) 2000 units CAPS Take 2,000 Units by mouth daily      fluconazole (DIFLUCAN) 100 MG tablet Take 1 tablet (100 mg) by mouth daily     loratadine (CLARITIN) 10 MG tablet Take 10 mg by mouth daily     PARoxetine (PAXIL) 20 MG tablet Take 20 mg by mouth every morning     sulfamethoxazole-trimethoprim (BACTRIM DS/SEPTRA DS) 800-160 MG tablet Take 1 tablet by mouth Every Mon, Tues two times daily DO NOT START UNTIL DIRECTED BY BMT STAFF             Physical Exam:   Vitals were reviewed.  Blood pressure 128/75, pulse 97, temperature 102.6  F (39.2  C), temperature source Oral, weight 80.7 kg (178 lb), SpO2 95 %.    Constitutional: awake, laying in bed, appears comfortable, in NAD  HEENT: MMM, EOM intact, sclerae clear and anicteric  Heart: RRR, no murmurs appreciated  Lungs: Clear to auscultation bilaterally, breathing comfortably on RA, no wheezes,  rhonchi  Abd: Soft, non-tender, BS+, no masses appreciated  MSK: Warm, FROM, no joint swelling  Skin: No rash or lesions on limited exam  Neuro: CN2-12 grossly intact, no lateralizing symptoms or focal neurologic deficits  Psych: Mood and affect WNL         Data:   No intake/output data recorded.    ROUTINE LABS (Last four results)  CMP  Recent Labs   Lab 07/21/19 1941 07/18/19  0820 07/15/19  0955    140 137   POTASSIUM 3.5 3.9 4.0   CHLORIDE 105 110* 106   CO2 26 27 26   ANIONGAP 8 3 6   * 92 91   BUN 17 14 20   CR 1.01 0.89 0.92   GFRESTIMATED 80 >90 89   GFRESTBLACK >90 >90 >90   ZHEN 8.5 8.9 9.0   PROTTOTAL 6.6* 6.7* 7.1   ALBUMIN 3.7 3.7 3.8   BILITOTAL 0.5 0.4 0.4   ALKPHOS 82 72 85   AST 22 25 26   ALT 19 20 19     CBC  Recent Labs   Lab 07/21/19 1941 07/18/19  0820 07/15/19  0955   WBC 7.3 2.9* 3.4*   RBC 2.94* 2.39* 2.58*   HGB 9.5* 7.8* 8.4*   HCT 29.2* 23.5* 25.1*   MCV 99 98 97   MCH 32.3 32.6 32.6   MCHC 32.5 33.2 33.5   RDW 21.9* 22.4* 21.0*   PLT 23* 38* 26*     INRNo lab results found in last 7 days.  Arterial Blood GasNo lab results found in last 7 days.

## 2019-07-22 NOTE — PLAN OF CARE
Patient tmax 99.5, other VSS. Denies n/v. No diarrhea. Has frequent dry cough. Up independent. Bcx from last night came back positive for pseudomonas, and Nasal swab came back + for Rhinovirus- MD aware. On cefepime and Tobramycin. Still needs VRE swab. Needs x2 bags of Platelets tomorrow morning prior to taking out Central line. Continue with plan of care.      Problem: Adult Inpatient Plan of Care  Goal: Plan of Care Review  Outcome: No Change     Problem: Adult Inpatient Plan of Care  Goal: Patient-Specific Goal (Individualization)  Outcome: No Change     Problem: Adult Inpatient Plan of Care  Goal: Absence of Hospital-Acquired Illness or Injury  Outcome: No Change

## 2019-07-22 NOTE — ED PROVIDER NOTES
History     Chief Complaint   Patient presents with     Fever     HPI  Angel Yanez is a 59 year old male with a history of multiple myeloma, GERD, hyperlipidemia who presents to the Emergency Department today for evaluation of a fever. The patient reports that at about 5 PM he began feeling chills and checked his temperature that was up to 100.2. About 30 minutes later he then checked his temperature again and it was up to 102. The patient then called his doctor who told him to present to the ED for further evaluation. The patient has been having a cough for about 9 weeks. The patient denies having a rash or urinary symptoms. He denies any redness or drainage from his Hsieh catheter.    I have reviewed the Medications, Allergies, Past Medical and Surgical History, and Social History in the [employer] system.    Past Medical History:   Diagnosis Date     GERD (gastroesophageal reflux disease)      H/O autologous stem cell transplant (H) 02/2005     Hyperlipidemia      Multiple myeloma (H) 2004     PONV (postoperative nausea and vomiting)      Psoriasis      Psoriatic arthritis (H)      Past Surgical History:   Procedure Laterality Date     ARTHROPLASTY HIP       COLONOSCOPY       HERNIA REPAIR       IR CVC TUNNEL PLACEMENT > 5 YRS OF AGE  1/22/2019     IR CVC TUNNEL PLACEMENT > 5 YRS OF AGE  5/16/2019     IR CVC TUNNEL REMOVAL LEFT  2/20/2019     IR FOLLOW UP VISIT OUTPATIENT  1/24/2019     ORTHOPEDIC SURGERY       PROCURE BONE MARROW N/A 5/14/2019    Procedure: Bone Marrow [place];  Surgeon: Antwan Erickson MD;  Location: [facility] OR     TRANSPLANT       Family History   Problem Relation Age of Onset     Diabetes Mother      Social History     Tobacco Use     Smoking status: Former Smoker     Smokeless tobacco: Never Used   Substance Use Topics     Alcohol use: Yes     Comment: occasionaly     Current Facility-Administered Medications   Medication     0.9% sodium chloride BOLUS     0.9% sodium chloride BOLUS     Followed by     sodium chloride 0.9% infusion     ceFEPIme (MAXIPIME) 1g vial to attach to  ml bag for ADULTS or NS 50 ml bag for PEDS     lidocaine (LMX4) cream     lidocaine 1 % 1 mL     sodium chloride (PF) 0.9% PF flush 3 mL     sodium chloride (PF) 0.9% PF flush 3 mL     Current Outpatient Medications   Medication     acyclovir (ZOVIRAX) 800 MG tablet     azithromycin (ZITHROMAX) 250 MG tablet     benzonatate (TESSALON) 100 MG capsule     calcium carbonate (CALCIUM CARBONATE) 600 MG tablet     Cholecalciferol (VITAMIN D3) 2000 units CAPS     fluconazole (DIFLUCAN) 100 MG tablet     loratadine (CLARITIN) 10 MG tablet     PARoxetine (PAXIL) 20 MG tablet     sulfamethoxazole-trimethoprim (BACTRIM DS/SEPTRA DS) 800-160 MG tablet     Allergies   Allergen Reactions     Chlorhexidine Itching     Avalide Hives     Chloroxylenol Rash     Technicare solution     Lorazepam Hives     Other reaction(s): Hives       Moxifloxacin Hives      Review of Systems   Constitutional: Positive for chills and fever.   HENT: Negative for congestion.    Eyes: Negative for redness.   Respiratory: Positive for cough. Negative for shortness of breath.    Cardiovascular: Negative for chest pain.   Gastrointestinal: Negative for abdominal pain.   Genitourinary: Negative for difficulty urinating.   Musculoskeletal: Negative for arthralgias and neck stiffness.   Skin: Negative for color change.   Neurological: Negative for headaches.   Psychiatric/Behavioral: Negative for confusion.     Physical Exam   BP: 140/80  Heart Rate: 123  Temp: 102.6  F (39.2  C)  Weight: 80.7 kg (178 lb)  SpO2: 93 %      Physical Exam   Constitutional: He is oriented to person, place, and time. He appears well-developed and well-nourished. No distress.   HENT:   Head: Normocephalic and atraumatic.   Mouth/Throat: No oropharyngeal exudate.   Eyes: Pupils are equal, round, and reactive to light. Right eye exhibits no discharge. Left eye exhibits no discharge.  No scleral icterus.   Neck: Normal range of motion. Neck supple.   Cardiovascular: Regular rhythm, normal heart sounds and intact distal pulses. Tachycardia present. Exam reveals no gallop and no friction rub.   No murmur heard.  Hsieh catheter in right upper chest.   Pulmonary/Chest: Effort normal and breath sounds normal. No respiratory distress. He has no wheezes. He exhibits no tenderness.   Abdominal: Soft. Bowel sounds are normal. He exhibits no distension. There is no tenderness.   Musculoskeletal: Normal range of motion. He exhibits no edema, tenderness or deformity.   Neurological: He is alert and oriented to person, place, and time. No cranial nerve deficit.   Skin: Skin is warm and dry. No rash noted. He is not diaphoretic. No erythema. No pallor.   Psychiatric: He has a normal mood and affect.   Nursing note and vitals reviewed.      ED Course   7:36 PM  The patient was seen and examined by Dr. Bentley in Room 21.       Procedures             EKG Interpretation:      Interpreted by Dusty Bentley  Time reviewed: 2004  Symptoms at time of EKG: None   Rhythm: sinus tachycardia  Rate: 103  Axis: Normal  Ectopy: none  Conduction: normal  ST Segments/ T Waves: No ST-T wave changes  Q Waves: none  Comparison to prior: Unchanged from 7/15/2019.  Previous was sinus rhythm with a rate of 90, morphology is same as previous.    Clinical Impression: sinus tachycardia                Critical Care time:  none             Labs Ordered and Resulted from Time of ED Arrival Up to the Time of Departure from the ED - No data to display         Assessments & Plan (with Medical Decision Making)   Is a 59-year-old male with a history of multiple myeloma and bone marrow transplant 2 months ago who presents with fever. Initial differential is broad and includes but is not limited to sepsis, pneumonia, UTI, skin and soft tissue infection, intra abdominal infection or other etiology.  Patient's symptoms began earlier today.  He  has been having a persistent cough for the past several weeks.  Upon arrival patient had a pulse of 123 and a temperature of 102.6.  He was 93% on room air.  Exam demonstrated no other acute abnormalities.  Lactic acid is not elevated.  Lab work shows a platelet count of 23.  ECG shows sinus tachycardia with a rate of 103.  Blood cultures were drawn and are pending at this time.  Chest x-ray shows no acute abnormalities.  UA shows no UTI.  Patient was given cefepime and 1 L of normal saline.  I discussed the case with the BMT service who recommends starting on cefepime and vancomycin and admitting to their service. We will admit for further monitoring work-up and treatment.      I have reviewed the nursing notes.    I have reviewed the findings, diagnosis, plan and need for follow up with the patient.     Medication List      There are no discharge medications for this visit.       Final diagnoses:   None   James BURNS, am serving as a trained medical scribe to document services personally performed by Dusty Bentley DO, based on the provider's statements to me.   Dusty BURNS DO, was physically present and have reviewed and verified the accuracy of this note documented by James Dodd.     7/21/2019   [facility], [place], EMERGENCY DEPARTMENT     Dusty Bentley DO  07/22/19 0241

## 2019-07-22 NOTE — PHARMACY-VANCOMYCIN DOSING SERVICE
Pharmacy Vancomycin Initial Note  Date of Service 2019  Patient's  1960  59 year old, male    Indication: Febrile Neutropenia    Current estimated CrCl = Estimated Creatinine Clearance: 87.7 mL/min (based on SCr of 1.01 mg/dL).    Creatinine for last 3 days  2019:  7:41 PM Creatinine 1.01 mg/dL    Recent Vancomycin Level(s) for last 3 days  No results found for requested labs within last 72 hours.      Vancomycin IV Administrations (past 72 hours)      No vancomycin orders with administrations in past 72 hours.                Nephrotoxins and other renal medications (From now, onward)    Start     Dose/Rate Route Frequency Ordered Stop    19  vancomycin 1250 mg in 0.9% NaCl 250 mL intermittent infusion 1,250 mg      15 mg/kg × 80.7 kg  over 90 Minutes Intravenous EVERY 12 HOURS 19            Contrast Orders - past 72 hours (72h ago, onward)    None                Plan:  1.  Start vancomycin  1250 mg IV q12h.   2.  Goal Trough Level: 10-15 mg/L   3.  Pharmacy will check trough levels as appropriate in 1-3 Days.    4. Serum creatinine levels will be ordered daily for the first week of therapy and at least twice weekly for subsequent weeks.    5. [place] method utilized to dose vancomycin therapy: Method 2    Pat Dougherty

## 2019-07-22 NOTE — PROGRESS NOTES
"BMT Daily Progress Note   07/22/2019    Patient ID:   Angel Yanez is a 60 yo man Day +66 s/p 2nd auto for IgM kappa MM .   Diagnosis MM Multiple myeloma  HCT Type Autologous    Prep Regimen Cytoxan  Melphalan   Donor Source Self     No  Primary BMT Provider Dr Lucio     Angel was admitted on 7/21/2019 for fevers    INTERVAL  HISTORY     Developed fevers last night about 5 pm to 102 at home.  No rigors.  Continues with nonproductive cough since the end of May 2019.  No hemoptysis.  No sinus symptoms including congestion, sinus pain/pressure/tooth pain.  He does have a runny nose with clear debris.    Review of Systems: 10 point ROS negative except as noted above.      PHYSICAL EXAM     Weight In/Out     Wt Readings from Last 3 Encounters:   07/22/19 79.9 kg (176 lb 1.6 oz)   07/18/19 79.2 kg (174 lb 11.2 oz)   07/15/19 78.9 kg (174 lb)      I/O last 3 completed shifts:  In: 2310 [P.O.:810; I.V.:1500]  Out: 2 [Urine:2]           /75   Pulse 95   Temp 98.6  F (37  C) (Axillary)   Resp 16   Ht 1.753 m (5' 9\")   Wt 79.9 kg (176 lb 1.6 oz)   SpO2 94%   BMI 26.01 kg/m       General: NAD, pleasant  Eyes:  sclera anicteric and not injected, no pain to max sinus with palpation  Nose/Mouth/Throat: OP moist, no ulcerations   Lungs: CTA bilaterally-maybe some wheezes,breathing comfortable on room air. Dry sounding cough  Cardiovascular: regular rate and rhythm, no M/R/G   Abdominal/Rectal: +BS, soft, NT, ND   Lymphatics: no edema  Skin: no rash or petechaie  Neuro: non-focal, no gross deficits  Additional Findings: CVC site NT, no drainage.           LABS AND IMAGING - PAST 24 HOURS     Results for orders placed or performed during the hospital encounter of 07/21/19 (from the past 24 hour(s))   Comprehensive metabolic panel   Result Value Ref Range    Sodium 138 133 - 144 mmol/L    Potassium 3.5 3.4 - 5.3 mmol/L    Chloride 105 94 - 109 mmol/L    Carbon Dioxide 26 20 - 32 mmol/L    Anion Gap 8 3 - 14 mmol/L "    Glucose 118 (H) 70 - 99 mg/dL    Urea Nitrogen 17 7 - 30 mg/dL    Creatinine 1.01 0.66 - 1.25 mg/dL    GFR Estimate 80 >60 mL/min/[1.73_m2]    GFR Estimate If Black >90 >60 mL/min/[1.73_m2]    Calcium 8.5 8.5 - 10.1 mg/dL    Bilirubin Total 0.5 0.2 - 1.3 mg/dL    Albumin 3.7 3.4 - 5.0 g/dL    Protein Total 6.6 (L) 6.8 - 8.8 g/dL    Alkaline Phosphatase 82 40 - 150 U/L    ALT 19 0 - 70 U/L    AST 22 0 - 45 U/L   CBC with platelets differential   Result Value Ref Range    WBC 7.3 4.0 - 11.0 10e9/L    RBC Count 2.94 (L) 4.4 - 5.9 10e12/L    Hemoglobin 9.5 (L) 13.3 - 17.7 g/dL    Hematocrit 29.2 (L) 40.0 - 53.0 %    MCV 99 78 - 100 fl    MCH 32.3 26.5 - 33.0 pg    MCHC 32.5 31.5 - 36.5 g/dL    RDW 21.9 (H) 10.0 - 15.0 %    Platelet Count 23 (LL) 150 - 450 10e9/L    Diff Method Automated Method     % Neutrophils 75.8 %    % Lymphocytes 14.3 %    % Monocytes 8.7 %    % Eosinophils 0.6 %    % Basophils 0.3 %    % Immature Granulocytes 0.3 %    Nucleated RBCs 0 0 /100    Absolute Neutrophil 5.5 1.6 - 8.3 10e9/L    Absolute Lymphocytes 1.0 0.8 - 5.3 10e9/L    Absolute Monocytes 0.6 0.0 - 1.3 10e9/L    Absolute Eosinophils 0.0 0.0 - 0.7 10e9/L    Absolute Basophils 0.0 0.0 - 0.2 10e9/L    Abs Immature Granulocytes 0.0 0 - 0.4 10e9/L    Absolute Nucleated RBC 0.0    Lactic acid whole blood   Result Value Ref Range    Lactic Acid 1.4 0.7 - 2.0 mmol/L   Blood culture   Result Value Ref Range    Specimen Description Blood Hsieh Right Chest     Special Requests Received in aerobic bottle only     Culture Micro (A)      Cultured on the 1st day of incubation:  Gram negative rods      Culture Micro       Critical Value/Significant Value, preliminary result only, called to and read back by  Ian Guevara , RN @3840 7.22.19 AA      Culture Micro       (Note)  POSITIVE for PSEUDOMONAS AERUGINOSA by RECOMY.[email or web address] multiplex nucleic  acid test. Final identification and antimicrobial susceptibility  testing will be verified by standard  methods.    Specimen tested with Verigene multiplex, gram-negative blood culture  nucleic acid test for the following targets: Acinetobacter sp.,  Citrobacter sp., Enterobacter sp., Proteus sp., E. coli, K.  pneumoniae/oxytoca, P. aeruginosa, and the following resistance  markers: CTXM, KPC, NDM, VIM, IMP and OXA.    Critical Value/Significant Value called to and read back by Ian Guevara RN, 7/22/19 at 1439. JL     Magnesium   Result Value Ref Range    Magnesium 1.8 1.6 - 2.3 mg/dL   Phosphorus   Result Value Ref Range    Phosphorus 2.5 2.5 - 4.5 mg/dL   ISTAT gases lactate dimitrios POCT   Result Value Ref Range    Ph Venous 7.40 7.32 - 7.43 pH    PCO2 Venous 40 40 - 50 mm Hg    PO2 Venous 33 25 - 47 mm Hg    Bicarbonate Venous 24 21 - 28 mmol/L    O2 Sat Venous 63 %    Lactic Acid 1.2 0.7 - 2.1 mmol/L   XR Chest 2 Views    Narrative    EXAM: XR CHEST 2 VW  7/21/2019 7:58 PM     HISTORY:  cough, fever    COMPARISON: CT chest 5/30/2019, Chest radiograph dated 5/26/2019    FINDINGS: PA and lateral views of the chest are obtained. Right IJ  central venous catheter tip projects over the low SVC.    Trachea is midline. The cardiomediastinal silhouette is within normal  limits. Pulmonary vasculature is distinct. No pleural effusion or  pneumothorax. No focal pulmonary opacities. No acute bony  abnormalities. The upper abdomen is unremarkable.      Impression    IMPRESSION:   Clear lungs.    I have personally reviewed the examination and initial interpretation  and I agree with the findings.    DEMARCO MARTINEZ MD   EKG 12-lead, tracing only   Result Value Ref Range    Interpretation ECG Click View Image link to view waveform and result    Blood culture   Result Value Ref Range    Specimen Description Blood Right Arm     Special Requests Received in aerobic bottle only     Culture Micro (A)      Cultured on the 1st day of incubation:  Gram negative rods      Culture Micro       Critical Value/Significant Value, preliminary  result only, called to and read back by  Rica Lo, RN @7790 on 7/22/19 HJM     UA with Microscopic   Result Value Ref Range    Color Urine Yellow     Appearance Urine Clear     Glucose Urine Negative NEG^Negative mg/dL    Bilirubin Urine Negative NEG^Negative    Ketones Urine Negative NEG^Negative mg/dL    Specific Gravity Urine 1.020 1.003 - 1.035    Blood Urine Negative NEG^Negative    pH Urine 6.0 5.0 - 7.0 pH    Protein Albumin Urine 10 (A) NEG^Negative mg/dL    Urobilinogen mg/dL Normal 0.0 - 2.0 mg/dL    Nitrite Urine Negative NEG^Negative    Leukocyte Esterase Urine Negative NEG^Negative    Source Midstream Urine     WBC Urine 1 0 - 5 /HPF    RBC Urine 2 0 - 2 /HPF    Transitional Epi <1 0 - 1 /HPF    Mucous Urine Present (A) NEG^Negative /LPF   Urine Culture   Result Value Ref Range    Specimen Description Midstream Urine     Special Requests Specimen received in preservative     Culture Micro Culture negative < 24 hours, reincubate    CT Sinus w/o Contrast    Narrative    Paranasal sinus CT without contrast    History:  recurrent neutropenic fevers; BMT patient; hx of  pansinusitis on CT 5/30; eval for interval change.    Comparison:  5/30/2019     Technique:  Images through the paranasal sinuses without intravenous  contrast with coronal reconstructions.    Findings:     Maxillary sinuses: Bilateral coastal thickening with increased  layering frothy debris.    Frontal sinuses: clear bilaterally.    Ethmoid air cells: Scattered mucosal thickening/debris, slightly  increased from prior.   Sphenoid sinus: clear.     Mucosal thickening and obstruction of the ostiomeatal units. The bony  walls of the paranasal sinuses are intact. There is mild leftward  deviation of the middle third of the nasal septum. Left mandibular  torus. Mastoid air cells are clear.  The adenoid tonsils in the nasopharynx are unremarkable.      Impression    Impression:    Increased layering frothy debris in the maxillary sinuses  with  slightly increased mucosal thickening/debris in the ethmoid air cells  since 5/30/2019, suspicious for acute sinusitis in the proper clinical  setting.    I have personally reviewed the examination and initial interpretation  and I agree with the findings.    KESHAWN CARTER MD   CT Chest w/o Contrast    Narrative    EXAMINATION: Chest CT  7/21/2019 10:57 PM    CLINICAL HISTORY: recurrent neutropenic fevers; BMT patient;  persistent chronic cough; CXR was clear    COMPARISON: 5/30/2019.    TECHNIQUE: CT imaging obtained through the chest without contrast.  Axial, coronal, and sagittal reconstructions and axial MIP reformatted  images are reviewed.     FINDINGS:  Lines and tubes: Right IJ central venous catheter tip terminates at  the superior cavoatrial junction.    Lungs: New tree-in-bud nodularity in the lingula. No consolidative  airspace opacities. No pleural effusion or pneumothorax. Segmental and  subsegmental groundglass opacities, likely representing atelectasis.  Calcified granuloma. Mild central bronchial wall thickening. The  central tracheobronchial tree is patent.    Mediastinum: The visualized thyroid gland is within normal limits.  Heart size is normal. No pericardial effusion.  Normal thoracic  vasculature. Hypoattenuation of the blood pool, suggestive of anemia.  Calcified mediastinal and right hilar nodes. No thoracic  lymphadenopathy. Normal esophagus.    Upper Abdomen: Limited evaluation of the upper abdomen demonstrates no  acute pathology.    Bones and soft tissues: Multilevel thoracolumbar wedge compression  deformities, not substantially changed from prior. No acute or  suspicious appearing bony abnormalities. Bilateral gynecomastia.      Impression    IMPRESSION:   1. New developing tree-in-bud nodularity involving the lingula,  concerning for infection.  2. Mild thickening of the airways as can be seen in  bronchitis/bronchitis.  3. Evidence of prior granulomatous disease.    I  have personally reviewed the examination and initial interpretation  and I agree with the findings.    EDWARD CAPONE MD   INR   Result Value Ref Range    INR 1.16 (H) 0.86 - 1.14   Partial thromboplastin time   Result Value Ref Range    PTT 74 (H) 22 - 37 sec   ABO/RH Type and Screen    Result Value Ref Range    ABO O     RH(D) Pos     Antibody Screen Pos (A)     Test Valid Only At          [facility]    Specimen Expires 07/25/2019     Blood Bank Comment       Delay in availability of Red Blood Cells called to  Jadyn Erickson @0200 7/22/19  Patient given HAILEY in 04/19/2019, which is known to cause positive antibody screen. All   clinical significant antibodies ruled out. 07/22/19 PT     Basic metabolic panel   Result Value Ref Range    Sodium 142 133 - 144 mmol/L    Potassium 3.5 3.4 - 5.3 mmol/L    Chloride 110 (H) 94 - 109 mmol/L    Carbon Dioxide 24 20 - 32 mmol/L    Anion Gap 8 3 - 14 mmol/L    Glucose 92 70 - 99 mg/dL    Urea Nitrogen 10 7 - 30 mg/dL    Creatinine 0.84 0.66 - 1.25 mg/dL    GFR Estimate >90 >60 mL/min/[1.73_m2]    GFR Estimate If Black >90 >60 mL/min/[1.73_m2]    Calcium 7.9 (L) 8.5 - 10.1 mg/dL   CBC with platelets differential   Result Value Ref Range    WBC 4.7 4.0 - 11.0 10e9/L    RBC Count 2.66 (L) 4.4 - 5.9 10e12/L    Hemoglobin 8.4 (L) 13.3 - 17.7 g/dL    Hematocrit 26.5 (L) 40.0 - 53.0 %     78 - 100 fl    MCH 31.6 26.5 - 33.0 pg    MCHC 31.7 31.5 - 36.5 g/dL    RDW 21.4 (H) 10.0 - 15.0 %    Platelet Count 17 (LL) 150 - 450 10e9/L    Diff Method Automated Method     % Neutrophils 65.7 %    % Lymphocytes 19.7 %    % Monocytes 13.6 %    % Eosinophils 0.6 %    % Basophils 0.0 %    % Immature Granulocytes 0.4 %    Nucleated RBCs 0 0 /100    Absolute Neutrophil 3.1 1.6 - 8.3 10e9/L    Absolute Lymphocytes 0.9 0.8 - 5.3 10e9/L    Absolute Monocytes 0.6 0.0 - 1.3 10e9/L    Absolute Eosinophils 0.0 0.0 - 0.7 10e9/L    Absolute Basophils  0.0 0.0 - 0.2 10e9/L    Abs Immature Granulocytes 0.0 0 - 0.4 10e9/L    Absolute Nucleated RBC 0.0    Respiratory Virus Panel by PCR   Result Value Ref Range    Respiratory Virus Source Nasopharyngeal     Influenza A Negative NEG^Negative    Influenza A, H1 Negative NEG^Negative    Influenza A, H3 Negative NEG^Negative    Influenza A 2009 H1N1 Negative NEG^Negative    Influenza B Negative NEG^Negative    Respiratory Syncytial Virus A Negative NEG^Negative    Respiratory Syncytial Virus B Negative NEG^Negative    Parainfluenza Virus 1 Negative NEG^Negative    Parainfluenza Virus 2 Negative NEG^Negative    Parainfluenza Virus 3 Negative NEG^Negative    Human Metapneumovirus Negative NEG^Negative    Human Rhinovirus Positive (A) NEG^Negative    Adenovirus Species B/E Negative NEG^Negative    Adenovirus Species C Negative NEG^Negative    Respiratory Virus Comment See Comment Below    Blood culture   Result Value Ref Range    Specimen Description Blood PURPLE PORT     Special Requests Received in aerobic bottle only     Culture Micro PENDING    Blood culture   Result Value Ref Range    Specimen Description Blood Red port     Special Requests Received in aerobic bottle only     Culture Micro PENDING    Partial thromboplastin time   Result Value Ref Range    PTT 40 (H) 22 - 37 sec   Interventional Radiology Adult/Peds IP Consult: Patient to be seen: URGENT within 4 hours; Call back #: [phone]  after 3:30 pm call  Hospitalist; remove Hsieh catheter after giving platelets; Requesting provider? Attending physi...    Narrative    Marielena Irwin, LONI     7/22/2019  3:18 PM      Patient is on IR schedule 7/23/2019 for a RIJ tunneled CVC   removal  Labs ordered for procedure. Labs must be corrected  Plts 50,000   or better INR 1.8 or better   No NPO required.   Consent will be done prior to procedure.   placed 5/16, positive blood cultures Plts are 17,000    [place] [phone] for  estimated time of   procedure.     Discussed with Dr. Isaiah Irwin IR RPA  [phone] Call pager  [phone] pager                 OVERALL PLAN     Angel Yanez is a 58 yo man Day +66 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  BMT/MM:   Slow engraftment; cell dose was only 0.639x10^6. ([place] [place] - known poor cell dose as failed chemo-mobilization 1/2019.)   - last dose GCSF 7/3 and 7/10 and then 7/18   -BM BX 6/11/2019:  No morphologic or immunophenotypic evidence of recurrent/persistent  plasma cell neoplasm  2.  HEME: Keep Hgb>8g/dL, plt >10.   -For line removal starting tomorrow at 4 am, give 2 units of plts and repeat plt count.  Trying to get Guille to 50,000.  - ongoing cytopenias - low cell dose with transplant + viral URI?  -   - Pt has RBC antibodies. Antibody work up completed 5/26. Hospitalist Confirmed with BB that the patient has no clinically significant antibodies     3.  ID: Fevers with mzly=895.6.  -Hospitalist ordered CT chest which shows some new melchor and bud which could be viral.  Also sinus CT with some frothy debris but no real sinus sx.  Continue cefepime for now.  No ENT consult.  -7/21 Bacteremia with pseudomonas( positive by verigene) Will continue cefepime, give dose of tobramycin today.  Have consulted IR to remove his Hsieh.  IR says they will remove tomorrow morning after plts are around 50,000.   -RVP from 7/21 is positive for rhino virus.  Symptomatic management.   - +parainfluenza 3 (5/30). Ongoing cough, no hemoptysis.  Cough drops, tessalon pearls prn.   - prophy with ACV, Fluconazole, Pentamidine (7/15). Stopped prophy azithro 6/24 -  resumed this 7/18 due to intermittent neutropenia.Hold azithromycin will on cefepime  - CMV neg 7/15. On CMV vaccine trial. ANC 1.0, study requires >1; discussed with Dr Mukherjee, appropriate to still give as planned     4.  GI:  No complaints.  - GI prophy- omeprazole.       5.  FEN/Renal: Creat, lytes wnl.  Eating well.   -Creat slt increased so IV fluid 100cc/hr     6.  Mood:  Cont Paxil        Funmi Ospina, PA-C  [id number]    The patient was seen and examined by me separately from the midlevel provider. The note reflects our mutual assessment and plans and were approved by me personally.   I personally reviewed today's lab results vital signs and radiology results.    Each point of the assessment and plan were reviewed by the midlevel and me and either endorsed by me or were my added decisions.    My pertinent physical findings today are: Now afebrile, clear lungs and no rash.    My assessment and plan are: 60 yo day 63 post second BMT for myeloma dmitted for fever. Stable and lucent today: await culture results. Cefepime and vanco for now: BC + for Gm- rods: add tobra and remove Hsieh.Stephan Godwin M.D.

## 2019-07-22 NOTE — PROGRESS NOTES
Psychosocial Assessment completed in the BMT Clinic and copied here for staff review.     Blood and Marrow Transplant   Psychosocial Assessment with   Clinical      REPEAT Assessment completed on 5/6/2019 of living situation, support system, financial status, functional status, coping, stressors, need for resources and social work intervention provided as needed. Information for this assessment was provided by pt's report in addition to medical chart review and consultation with medical team.      Present at assessment:   Patient: Angel Yanez  : ZUHAIR Woodard, JANELL     Diagnosis: Multiple Myeloma     Transplant type: Autologous     Donor: Autologous      Physician: Adebayo Lucio MD     Nurse Coordinator: Su Pate RN     Permanent Address:   [address] [place] [id number]     Contact Information:  Pt's Home Phone: [phone]  Pt's Cell Phone: [phone]  Pt Email: [email or web address]  Wife-Jennifer Yanez's Phone: [phone]  Son-Dalton Yanez's Phone: [phone]     Presenting Information:  Zaheer is a 58 year old male diagnosed with multiple myeloma who presents for evaluation for autologous transplant at the [facility] ([facility] This will be pt's 2nd autologous transplant at the [facility].     Decision Making: Self     Health Care Directive: Yes. Pt has completed the healthcare directive completed and on file.      Relationship Status: . Pt described relationship as stable and supportive.      Special Needs: None identified at this time.      Family/Support System: Pt endorsed a good support system including family and close friends who will be available to support pt throughout transplant process.      Spouse: Jennifer Yanez  Children: 3 Children; Son-Alivia Yanez (28-Lives in [place], [place]), Son-Magdiel Yanez (Lives in [place], [place]) and Daughter-Jerry Yanez (Lives in [place])  Grandchildren: 1 Grandson-Alivia Tyler (1.5 years  "old)  Parents:   Siblings: 1 Sister-Sofie Calvin (Lives in [place], [place])     Caregiver: JANELL discussed with pt the caregiver role and expectation at length. During last PSA on 1/15/2019, pt said he won t have a caregiver 24/7 but will tell me he does if that is required and pt said SW can put his sonLucille down as well  if Social Work needed another name on the caregiver contract . During the PSA today (2019), pt said \"oh yea I will have a caregiver\". Pt's wife runs a small  out of the home, so she is there during the day. SW reminded pt of the last conversation and SW repeatedly reminded pt he is required to have a caregiver 24/7 for 30 days. Pt signed the caregiver contract which will be scanned into the EMR. Pt said his wife-Jennifer and son-Dalton will be the caregivers. Caregiver education and resources provided. EULALIO Delgadillo, and Andrwe are aware. Dr. Lucio said he would reinforce the caregiver requirement during the close appointment.       Caregiver Contact Information:  Wife-Jennifer Yanez's Phone: [phone]  SonKiera Yanez's Phone: [phone]     Transportation Mode: Private Vehicle. During last PSA on 1/15/2019, pt said he will be driving himself to clinic post-transplant. JANELL thoroughly explained to pt that he cannot drive post-transplant until approved by a Physician. EULALIO Delgadillo, and Andrew were notified. Dr. Lucio said he would reinforce the \"no driving\" during the close appointment as well.      Insurance: Pt has [employer] health insurance; pt has an . Pt denied specific insurance concerns at this time. JANELL reiterated information about the [employer]  should specific insurance questions arise as pt moves through transplant process. Future Insurance questions referred to [employer] -Jessica Downs (P: [phone]).      Sources of Income: Pt's income, pt went back to work, and his " "wife's income. Once pt completely stops working, pt will re-file for short term disability. Pt also has long term disability available. Pt denied anticipation of financial hardship related to BMT at this time. SW provided information on gay options and encouraged pt to contact this SW for support should financial situation change.      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 Pt's wife-Jennifer owns her own  at home.      Mental Health: Pt denied a history of mental health concerns, specific diagnoses or medications at this time. SW explained that it's not uncommon for patients going through transplant to experience symptoms of depression/anxiety.      PHQ-9:  Pt scored a 4 which indicates no sign of depression on the depression severity scale. Pt does not endorse feelings  of depression at this time.      GAD7:  Pt scored a 3 which indicates no sign of anxiety on the Generalized Anxiety Disorder Questionnaire. Pt endorses this is an accurate reflection of his emotional state.     Chemical Use: Pt reported that he quit smoking 35 years ago. Pt reported minimal alcohol consumption; 2 beers in the past 3 months. Pt reported no hx of marijuana or other drugs. Based on the information provided, there appear to be no specific risks or concerns identified at this time.      Trauma/Loss/Abuse History: Multiple losses associated with cancer diagnosis and treatment, including health, employment, changes to physical appearance, etc.      Spirituality: Pt would not like a blessing ceremony while inpatient. SW explained that there are Chaplains on the unit and pt can request to meet with a  at anytime.     Coping: Pt noted that he is currently feeling \"frustrated and ready to begin\". Pt shared that his main coping mechanisms are exercise (riding bike). SW and pt discussed additional positive coping mechanisms that pt can utilize while in the hospital. While hospitalized, pt plans to walk the " hallways and watch tv.      Education Provided: Transplant process expectations, Caregiver requirements, Caregiver self-care, Financial issues related to transplant, Financial resources/grants available, Common psychosocial stressors pre/post transplant, Support group(s) available, Hospital resources available, Social Work role and Resources for grandchild.     Interventions Provided: Psychosocial Support and Education      Assessment and Recommendations for Team:  Pt is a 58 year old male diagnosed with multiple myeloma who is here undergoing preparation for a planned autologous transplant. Pt appeared to be in good spirits during conversation. Pt is pleasant and able to articulate concerns/coping mechanisms in an appropriate manner. Pt feels comfortable communicating with the medical team. Pt has a good supportive network of family and friends who are involved. Pt has developed a few coping mechanisms such as exercising and working on his hobbies. Pt may benefit from assistance in developing coping mechanisms. Pt and pt's family will benefit from ongoing psychosocial support in regards to coping with the adjustment to the BMT process.      Pt has a good support system. Please see caregiver section for update. Pt verbalizes understanding of the transplant process and wanting to proceed. SW provided contact information and encouraged pt to contact SW with questions, concerns, resources and for support.     Per this assessment, SW did not identify any barriers to this patient moving forward with transplant.     Important Information:   -See caregiver section. Check in with pt while inpatient to re-confirm plan.  -Pt said he plans to drive to clinic appointments post-transplant. Dr. Lucio notified and Dr. Lucio was going to tell pt he cannot drive.     Follow up Planned:   Psychosocial support

## 2019-07-22 NOTE — PLAN OF CARE
Pt was admitted tonight through ED with fever of 102 and frequent, persistent, dry cough. Chest and sinus CT pending. OVSS and WNL. Pt states he has had the cough for many weeks. He denies pain and nausea. Skin is CDI. He received cefepime and vanco. Labs drawn and no replacements were needed. Lactic is WNL. PTT was increased to 74. He is on droplet precautions pending RSV swab results. He was given VRE swab and instructed on procedure for collection.  Skin is intact. A+O and independent. NS running at 100. He will need caps and dressing change today. T max since midnight 99.4 for he will need cultures if he spikes today.     Problem: Adult Inpatient Plan of Care  Goal: Optimal Comfort and Wellbeing  Outcome: No Change     Problem: Infection  Goal: Infection Symptom Resolution  Outcome: Improving     Problem: Infection Risk (Stem Cell/Bone Marrow Transplant)  Goal: Absence of Infection Signs/Symptoms  Outcome: No Change

## 2019-07-22 NOTE — ED TRIAGE NOTES
Pt arrived via car with a fever up to 102.3 F oral at home. Pt reports a cough for 9 weeks. Reports chills today which prompted him to check his temp. Pt has a history of BMT transplant on 5/17/19.

## 2019-07-23 ENCOUNTER — APPOINTMENT (OUTPATIENT)
Dept: INTERVENTIONAL RADIOLOGY/VASCULAR | Facility: CLINIC | Age: 59
DRG: 872 | End: 2019-07-23
Attending: RADIOLOGY
Payer: COMMERCIAL

## 2019-07-23 LAB
ABO + RH BLD: ABNORMAL
ABO + RH BLD: ABNORMAL
ANION GAP SERPL CALCULATED.3IONS-SCNC: 9 MMOL/L (ref 3–14)
BASOPHILS # BLD AUTO: 0 10E9/L (ref 0–0.2)
BASOPHILS NFR BLD AUTO: 0.3 %
BLD GP AB SCN SERPL QL: ABNORMAL
BLD PROD TYP BPU: ABNORMAL
BLD PROD TYP BPU: NORMAL
BLD UNIT ID BPU: 0
BLD UNIT ID BPU: 0
BLOOD BANK CMNT PATIENT-IMP: ABNORMAL
BLOOD BANK CMNT PATIENT-IMP: ABNORMAL
BLOOD PRODUCT CODE: NORMAL
BLOOD PRODUCT CODE: NORMAL
BPU ID: NORMAL
BPU ID: NORMAL
BUN SERPL-MCNC: 10 MG/DL (ref 7–30)
CALCIUM SERPL-MCNC: 8.5 MG/DL (ref 8.5–10.1)
CHLORIDE SERPL-SCNC: 109 MMOL/L (ref 94–109)
CO2 SERPL-SCNC: 24 MMOL/L (ref 20–32)
CREAT SERPL-MCNC: 0.75 MG/DL (ref 0.66–1.25)
DIFFERENTIAL METHOD BLD: ABNORMAL
EOSINOPHIL # BLD AUTO: 0.1 10E9/L (ref 0–0.7)
EOSINOPHIL NFR BLD AUTO: 3.2 %
ERYTHROCYTE [DISTWIDTH] IN BLOOD BY AUTOMATED COUNT: 21.3 % (ref 10–15)
GFR SERPL CREATININE-BSD FRML MDRD: >90 ML/MIN/{1.73_M2}
GLUCOSE SERPL-MCNC: 84 MG/DL (ref 70–99)
HCT VFR BLD AUTO: 26.2 % (ref 40–53)
HGB BLD-MCNC: 8.5 G/DL (ref 13.3–17.7)
IMM GRANULOCYTES # BLD: 0 10E9/L (ref 0–0.4)
IMM GRANULOCYTES NFR BLD: 0.3 %
LYMPHOCYTES # BLD AUTO: 0.9 10E9/L (ref 0.8–5.3)
LYMPHOCYTES NFR BLD AUTO: 29.8 %
MCH RBC QN AUTO: 31.6 PG (ref 26.5–33)
MCHC RBC AUTO-ENTMCNC: 32.4 G/DL (ref 31.5–36.5)
MCV RBC AUTO: 97 FL (ref 78–100)
MONOCYTES # BLD AUTO: 0.6 10E9/L (ref 0–1.3)
MONOCYTES NFR BLD AUTO: 19 %
NEUTROPHILS # BLD AUTO: 1.5 10E9/L (ref 1.6–8.3)
NEUTROPHILS NFR BLD AUTO: 47.4 %
NRBC # BLD AUTO: 0 10*3/UL
NRBC BLD AUTO-RTO: 0 /100
NUM BPU REQUESTED: 1
NUM BPU REQUESTED: 1
NUM BPU REQUESTED: 2
PLATELET # BLD AUTO: 49 10E9/L (ref 150–450)
POTASSIUM SERPL-SCNC: 3.5 MMOL/L (ref 3.4–5.3)
RBC # BLD AUTO: 2.69 10E12/L (ref 4.4–5.9)
SODIUM SERPL-SCNC: 142 MMOL/L (ref 133–144)
SPECIMEN EXP DATE BLD: ABNORMAL
TRANSFUSION STATUS PATIENT QL: NORMAL
WBC # BLD AUTO: 3.2 10E9/L (ref 4–11)

## 2019-07-23 PROCEDURE — 25000128 H RX IP 250 OP 636: Performed by: INTERNAL MEDICINE

## 2019-07-23 PROCEDURE — 36589 REMOVAL TUNNELED CV CATH: CPT | Mod: RT

## 2019-07-23 PROCEDURE — 87070 CULTURE OTHR SPECIMN AEROBIC: CPT | Performed by: INTERNAL MEDICINE

## 2019-07-23 PROCEDURE — 25000132 ZZH RX MED GY IP 250 OP 250 PS 637: Performed by: INTERNAL MEDICINE

## 2019-07-23 PROCEDURE — 87040 BLOOD CULTURE FOR BACTERIA: CPT | Performed by: PHYSICIAN ASSISTANT

## 2019-07-23 PROCEDURE — P9037 PLATE PHERES LEUKOREDU IRRAD: HCPCS | Performed by: PHYSICIAN ASSISTANT

## 2019-07-23 PROCEDURE — 20600000 ZZH R&B BMT

## 2019-07-23 PROCEDURE — 87077 CULTURE AEROBIC IDENTIFY: CPT | Performed by: PHYSICIAN ASSISTANT

## 2019-07-23 PROCEDURE — 25800030 ZZH RX IP 258 OP 636: Performed by: PHYSICIAN ASSISTANT

## 2019-07-23 PROCEDURE — 80048 BASIC METABOLIC PNL TOTAL CA: CPT | Performed by: INTERNAL MEDICINE

## 2019-07-23 PROCEDURE — 40000141 ZZH STATISTIC PERIPHERAL IV START W/O US GUIDANCE

## 2019-07-23 PROCEDURE — 85025 COMPLETE CBC W/AUTO DIFF WBC: CPT | Performed by: INTERNAL MEDICINE

## 2019-07-23 PROCEDURE — 25000128 H RX IP 250 OP 636: Performed by: PHYSICIAN ASSISTANT

## 2019-07-23 RX ORDER — SODIUM CHLORIDE 9 MG/ML
1000 INJECTION, SOLUTION INTRAVENOUS CONTINUOUS
Status: ACTIVE | OUTPATIENT
Start: 2019-07-23 | End: 2019-07-24

## 2019-07-23 RX ORDER — LIDOCAINE HYDROCHLORIDE 10 MG/ML
1-30 INJECTION, SOLUTION EPIDURAL; INFILTRATION; INTRACAUDAL; PERINEURAL
Status: DISCONTINUED | OUTPATIENT
Start: 2019-07-23 | End: 2019-07-23 | Stop reason: HOSPADM

## 2019-07-23 RX ORDER — NICOTINE POLACRILEX 4 MG
15-30 LOZENGE BUCCAL
Status: DISCONTINUED | OUTPATIENT
Start: 2019-07-23 | End: 2019-07-25 | Stop reason: HOSPADM

## 2019-07-23 RX ORDER — DEXTROSE MONOHYDRATE 25 G/50ML
25-50 INJECTION, SOLUTION INTRAVENOUS
Status: DISCONTINUED | OUTPATIENT
Start: 2019-07-23 | End: 2019-07-25 | Stop reason: HOSPADM

## 2019-07-23 RX ADMIN — Medication 3 ML: at 04:48

## 2019-07-23 RX ADMIN — CEFEPIME HYDROCHLORIDE 2 G: 2 INJECTION, POWDER, FOR SOLUTION INTRAVENOUS at 04:48

## 2019-07-23 RX ADMIN — ACYCLOVIR 800 MG: 800 TABLET ORAL at 19:35

## 2019-07-23 RX ADMIN — PAROXETINE HYDROCHLORIDE HEMIHYDRATE 20 MG: 20 TABLET, FILM COATED ORAL at 07:51

## 2019-07-23 RX ADMIN — MELATONIN 2000 UNITS: at 07:51

## 2019-07-23 RX ADMIN — CEFEPIME HYDROCHLORIDE 2 G: 2 INJECTION, POWDER, FOR SOLUTION INTRAVENOUS at 19:35

## 2019-07-23 RX ADMIN — ACYCLOVIR 800 MG: 800 TABLET ORAL at 07:51

## 2019-07-23 RX ADMIN — TOBRAMYCIN SULFATE 400 MG: 40 INJECTION, SOLUTION INTRAMUSCULAR; INTRAVENOUS at 11:21

## 2019-07-23 RX ADMIN — Medication 1200 MG: at 07:51

## 2019-07-23 RX ADMIN — CEFEPIME HYDROCHLORIDE 2 G: 2 INJECTION, POWDER, FOR SOLUTION INTRAVENOUS at 12:55

## 2019-07-23 RX ADMIN — FLUCONAZOLE 100 MG: 100 TABLET ORAL at 07:51

## 2019-07-23 ASSESSMENT — ACTIVITIES OF DAILY LIVING (ADL)
ADLS_ACUITY_SCORE: 10

## 2019-07-23 ASSESSMENT — MIFFLIN-ST. JEOR: SCORE: 1578.38

## 2019-07-23 NOTE — PROGRESS NOTES
Patient Name: Angel Yanez  Medical Record Number: [id number]  Today's Date: 7/23/2019    Procedure: Tunneled catheter removal  Proceduralist: KAMALA Woodard    Sedation Notes: no sedation     Procedure start time: 1025  Puncture time: N/A  Procedure end time: 1034    Report given to: EULALIO Bhakta 5C      Other Notes: Pt arrived to IR room 6 from . Consent reviewed. Pt denies any questions or concerns regarding procedure.   Pt positioned supine and monitored per protocol.   Catheter tip sent for culture.Removal site covered with sterile dressing.   Pt tolerated procedure without any noted complications. Pt transferred back to .

## 2019-07-23 NOTE — PROCEDURES
Interventional Radiology Brief Post Procedure Note    Procedure: IR CVC TUNNEL REMOVAL RIGHT    Proceduralist: Horacio Garrido PA-C    Assistant: None    Time Out: Prior to the start of the procedure and with procedural staff participation, I verbally confirmed the patient s identity using two indicators, relevant allergies, that the procedure was appropriate and matched the consent or emergent situation, and that the correct equipment/implants were available. Immediately prior to starting the procedure I conducted the Time Out with the procedural staff and re-confirmed the patient s name, procedure, and site/side. (The Joint Commission universal protocol was followed.)  Yes    Medications   Medication Event Details Admin User Admin Time       Sedation: None    Findings: Completed removal of 9.5 Portuguese double lumen tunneled central venous catheter via right IJ. Aspirates and flushes freely, heparin locked and ready for immediate use. Tip in high right atrium.    Estimated Blood Loss: Minimal    SPECIMENS: None    Complications: 1. None     Condition: Stable    Plan: Follow-up per primary team.     Comments: See dictated procedure note for full details.    Horacio Garrido PA-C

## 2019-07-23 NOTE — PLAN OF CARE
[id number]  VSS. Afebrile. Up ad mickey. Collecting voids in urinals. 2 units of platelets given this AM for central line removal in IR today. No time scheduled yet. Platelets 49 this AM. Continue to monitor.    Problem: Adult Inpatient Plan of Care  Goal: Plan of Care Review  7/23/2019 0629 by Latrice Miranda RN  Outcome: No Change     Problem: Adult Inpatient Plan of Care  Goal: Patient-Specific Goal (Individualization)  7/23/2019 0629 by Latrice Miranda RN  Outcome: No Change     Problem: Adult Inpatient Plan of Care  Goal: Absence of Hospital-Acquired Illness or Injury  7/23/2019 0629 by Latrice Miranda RN  Outcome: No Change     Problem: Adult Inpatient Plan of Care  Goal: Optimal Comfort and Wellbeing  7/23/2019 0629 by Latrice Miranda RN  Outcome: No Change     Problem: Infection  Goal: Infection Symptom Resolution  7/23/2019 0629 by Latrice Miranda RN  Outcome: No Change     Problem: Adjustment to Transplant (Stem Cell/Bone Marrow Transplant)  Goal: Optimal Coping with Transplant  7/23/2019 0629 by Latrice Miranda RN  Outcome: No Change     Problem: Fatigue (Stem Cell/Bone Marrow Transplant)  Goal: Energy Level Supports Daily Activity  7/23/2019 0629 by Latrice Miranda RN  Outcome: No Change     Problem: Infection Risk (Stem Cell/Bone Marrow Transplant)  Goal: Absence of Infection Signs/Symptoms  7/23/2019 0629 by Latrice Miranda RN  Outcome: No Change

## 2019-07-23 NOTE — PROGRESS NOTES
"BMT Daily Progress Note   07/23/2019    Patient ID:   Angel Yanez is a 58 yo man Day +67 s/p 2nd auto for IgM kappa MM .   Diagnosis MM Multiple myeloma  HCT Type Autologous    Prep Regimen Cytoxan  Melphalan   Donor Source Self     No  Primary BMT Provider Dr Lucio     Angel was admitted on 7/21/2019 for fevers    INTERVAL  HISTORY     Continues with dry cough.  Resp viral swab says he has rhino virus. Cough worse over the last 10 days.   No hemoptysis. Denies No sinus symptoms including congestion, sinus pain/pressure/tooth pain.  He does have a runny nose with clear debris.    Review of Systems: 10 point ROS negative except as noted above.      PHYSICAL EXAM     Weight In/Out     Wt Readings from Last 3 Encounters:   07/23/19 78.3 kg (172 lb 9.9 oz)   07/18/19 79.2 kg (174 lb 11.2 oz)   07/15/19 78.9 kg (174 lb)      I/O last 3 completed shifts:  In: 3756 [P.O.:1050; I.V.:2100]  Out: 550 [Urine:550]           /80   Pulse 95   Temp 97  F (36.1  C) (Axillary)   Resp 16   Ht 1.753 m (5' 9\")   Wt 78.3 kg (172 lb 9.9 oz)   SpO2 96%   BMI 25.49 kg/m       General: NAD, pleasant  Eyes:  sclera anicteric and not injected, no pain to max sinus with palpation  Nose/Mouth/Throat: OP moist, no ulcerations   Lungs: CTA bilaterally-maybe some wheezes,breathing comfortable on room air. Dry sounding cough  Cardiovascular: regular rate and rhythm, no M/R/G   Abdominal/Rectal: +BS, soft, NT, ND   Lymphatics: no edema  Skin: no rash or petechaie  Neuro: non-focal, no gross deficits  Additional Findings: CVC site NT, no drainage.           LABS AND IMAGING - PAST 24 HOURS     Results for orders placed or performed during the hospital encounter of 07/21/19 (from the past 24 hour(s))   Blood culture   Result Value Ref Range    Specimen Description Blood PURPLE PORT     Special Requests Received in aerobic bottle only     Culture Micro No growth after 14 hours    Blood culture   Result Value Ref Range    Specimen " Description Blood Red port     Special Requests Received in aerobic bottle only     Culture Micro No growth after 14 hours    Partial thromboplastin time   Result Value Ref Range    PTT 40 (H) 22 - 37 sec   Interventional Radiology Adult/Peds IP Consult: Patient to be seen: URGENT within 4 hours; Call back #:   after 3:30 pm call  Hospitalist; remove Hsieh catheter after giving platelets; Requesting provider? Attending physi...    Narrative    Marielena Irwin, RPA     7/22/2019  3:18 PM      Patient is on IR schedule 7/23/2019 for a RIJ tunneled CVC   removal  Labs ordered for procedure. Labs must be corrected  Plts 50,000   or better INR 1.8 or better   No NPO required.   Consent will be done prior to procedure.   placed 5/16, positive blood cultures Plts are 17,000    Please contact the [place] RN [place] [phone] for estimated time of   procedure.     Discussed with Dr. Isaiah Irwin IR RPA  [phone] Call pager  [phone] pager           Platelet count   Result Value Ref Range    Platelet Count 21 (LL) 150 - 450 10e9/L   Vancomycin Resistant Enterococcus (VRE) Culture   Result Value Ref Range    Specimen Description Feces     Special Requests Specimen collected in eSwab transport (white cap)     Culture Micro PENDING    Platelets prepare order unit   Result Value Ref Range    Blood Component Type PLT Pheresis     Units Ordered 1    Blood component   Result Value Ref Range    Unit Number [id number]     Blood Component Type PlateletPheresis LeukoReduced Irradiated     Division Number 00     Status of Unit Released to care unit 07/23/2019 0257     Blood Product Code [id number]     Unit Status ISS    Basic metabolic panel   Result Value Ref Range    Sodium 142 133 - 144 mmol/L    Potassium 3.5 3.4 - 5.3 mmol/L    Chloride 109 94 - 109 mmol/L    Carbon Dioxide 24 20 - 32 mmol/L    Anion Gap 9 3 - 14 mmol/L    Glucose 84 70 - 99 mg/dL    Urea Nitrogen 10 7 - 30  mg/dL    Creatinine 0.75 0.66 - 1.25 mg/dL    GFR Estimate >90 >60 mL/min/[1.73_m2]    GFR Estimate If Black >90 >60 mL/min/[1.73_m2]    Calcium 8.5 8.5 - 10.1 mg/dL   CBC with platelets differential   Result Value Ref Range    WBC 3.2 (L) 4.0 - 11.0 10e9/L    RBC Count 2.69 (L) 4.4 - 5.9 10e12/L    Hemoglobin 8.5 (L) 13.3 - 17.7 g/dL    Hematocrit 26.2 (L) 40.0 - 53.0 %    MCV 97 78 - 100 fl    MCH 31.6 26.5 - 33.0 pg    MCHC 32.4 31.5 - 36.5 g/dL    RDW 21.3 (H) 10.0 - 15.0 %    Platelet Count 49 (LL) 150 - 450 10e9/L    Diff Method Automated Method     % Neutrophils 47.4 %    % Lymphocytes 29.8 %    % Monocytes 19.0 %    % Eosinophils 3.2 %    % Basophils 0.3 %    % Immature Granulocytes 0.3 %    Nucleated RBCs 0 0 /100    Absolute Neutrophil 1.5 (L) 1.6 - 8.3 10e9/L    Absolute Lymphocytes 0.9 0.8 - 5.3 10e9/L    Absolute Monocytes 0.6 0.0 - 1.3 10e9/L    Absolute Eosinophils 0.1 0.0 - 0.7 10e9/L    Absolute Basophils 0.0 0.0 - 0.2 10e9/L    Abs Immature Granulocytes 0.0 0 - 0.4 10e9/L    Absolute Nucleated RBC 0.0    Blood culture   Result Value Ref Range    Specimen Description Blood PURPLE PORT     Special Requests Received in aerobic bottle only     Culture Micro No growth after 1 hour    Platelets prepare order unit conditional   Result Value Ref Range    Blood Component Type PLT Pheresis     Units Ordered 1    Blood component   Result Value Ref Range    Unit Number [id number]     Blood Component Type PlateletPheresis,LeukoRed Irrad (Part 3)     Division Number 00     Status of Unit Released to care unit 07/23/2019 0847     Blood Product Code [id number]     Unit Status ISS          OVERALL PLAN     Angel Yanez is a 58 yo man Day +67 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  BMT/MM:   Slow engraftment; cell dose was only 0.639x10^6. ([place] - known poor cell dose as failed chemo-mobilization 1/2019.)   - last dose GCSF 7/3 and 7/10 and then 7/18   -BM BX 6/11/2019:  No morphologic or  immunophenotypic evidence of recurrent/persistent  plasma cell neoplasm  2.  HEME: Keep Hgb>8g/dL, plt >10.   -For line removal today received 3 units of plts, 2 prior to removal and one infused while removing line.  Trying to get Guille to 50,000.  - ongoing cytopenias - low cell dose with transplant + viral URI?  No G-CSF needed today  - Pt has RBC antibodies. Antibody work up completed 5/26. Hospitalist Confirmed with BB that the patient has no clinically significant antibodies-      3.  ID: Fever curve has come down. T Max=99.5  -Hospitalist ordered CT chest which shows some new tree and bud which could be viral.  Also sinus CT with some frothy debris but no real sinus sx.  Continue cefepime for now.  No ENT consult.  -7/21 Bacteremia( 2/2 bc+) 7/22 and 7/23 bc are neg. Pseudomonas( positive by verigene) Will continue cefepime, give another dose of tobramycin today.  Have consulted IR and his Hsieh has been removed  -RVP from 7/21 is positive for rhino virus.  Symptomatic management.   - +parainfluenza 3 (5/30). Ongoing cough, no hemoptysis.  Cough drops, tessalon pearls prn. 7/21 PIV3=neg  - prophy with ACV, Fluconazole, Pentamidine (7/15). Stopped prophy azithro 6/24 -  resumed  7/18 due to intermittent neutropenia.Hold azithromycin will on cefepime  - CMV neg 7/15. On CMV vaccine trial. ANC 1.0, study requires >1; discussed with Dr Mukherjee, appropriate to still give as planned     4.  GI:  No complaints.  - GI prophy- omeprazole.       5.  FEN/Renal: Creat, lytes wnl. Eating well.   -Creat=0.75, improved.  Decrease IV fluid 50 cc/hr     6.  Mood:  Cont Paxil        JESICA CatalanC  7305    The patient was seen and examined by me separately from the midlevel provider. The note reflects our mutual assessment and plans and were approved by me personally.   I personally reviewed today's lab results vital signs and radiology results.    Each point of the assessment and plan were reviewed by the midlevel and me and  either endorsed by me or were my added decisions.    My pertinent physical findings today are: Now afebrile, clear lungs and no rash.    My assessment and plan are: 60 yo day 63 post second BMT for myeloma dmitted for fever. Stable and lucent today: await culture results. Cefepime and vanco for now: BC + for Gm- rods: add tobra and remove Hsieh. Redose with tobra pending sensitivities.    Stephan Godwin M.D.

## 2019-07-23 NOTE — PLAN OF CARE
AVSS on room air. Last fever 7/21. Pt had a unit of platelets today then had jennings pulled, dressing CDI. Received dose of tobramycin today. NS infusing at 50 ml/hr in PIV L FA. Independent in room and halls. Pt is pleasant and cooperative. Denies pain or nausea, good appetite. Possible discharge home tomorrow.     Problem: Adult Inpatient Plan of Care  Goal: Plan of Care Review  7/23/2019 1733 by Livia Degroot RN  Outcome: Improving  7/23/2019 0629 by Latrice Miranda RN  Outcome: No Change  Goal: Patient-Specific Goal (Individualization)  7/23/2019 1733 by Livia Degroot RN  Outcome: Improving  7/23/2019 0629 by Latrice Miranda RN  Outcome: No Change  Goal: Absence of Hospital-Acquired Illness or Injury  7/23/2019 1733 by Livia Degroot RN  Outcome: Improving  7/23/2019 0629 by Latrice Miranda RN  Outcome: No Change  Goal: Optimal Comfort and Wellbeing  7/23/2019 1733 by Livia Degroot RN  Outcome: Improving  7/23/2019 0629 by Latrice Miranda RN  Outcome: No Change  Goal: Readiness for Transition of Care  Outcome: Improving  Goal: Rounds/Family Conference  Outcome: Improving     Problem: Infection  Goal: Infection Symptom Resolution  7/23/2019 1733 by Livia Degroot RN  Outcome: Improving  7/23/2019 0629 by Latrice Miranda RN  Outcome: No Change     Problem: Adjustment to Transplant (Stem Cell/Bone Marrow Transplant)  Goal: Optimal Coping with Transplant  7/23/2019 1733 by Livia Degroot RN  Outcome: Improving  7/23/2019 0629 by Latrice Miranda RN  Outcome: No Change     Problem: Fatigue (Stem Cell/Bone Marrow Transplant)  Goal: Energy Level Supports Daily Activity  7/23/2019 1733 by Livia Degroot RN  Outcome: Improving  7/23/2019 0629 by Latrice Miranda RN  Outcome: No Change     Problem: Infection Risk (Stem Cell/Bone Marrow Transplant)  Goal: Absence of Infection Signs/Symptoms  7/23/2019 1733 by Livia Degroot RN  Outcome: Improving  7/23/2019 0629 by Latrice Miranda RN  Outcome: No Change

## 2019-07-23 NOTE — PROGRESS NOTES
Antimicrobial Stewardship Team Note     Antimicrobial Stewardship Program - A joint venture between [facility] and  Physicians to optimize antibiotic management.  NOT a formal consult - Restricted Antimicrobial Review      Patient: Angel Yanez  MRN: [id number]  Allergies: Chlorhexidine; Avalide; Chloroxylenol; Lorazepam; and Moxifloxacin     Brief Summary: Angel Yanez is a 59 year old male that presented to ED on 7/21 for evaluation of a fever. PMH is significant for multiple myeloma, GERD, and hyperlipidemia. 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  HPI: Patient was previously admitted for FN on 5/26-5/28 and discharged on IV ceftriaxone. The patient was then admitted 5/29-6/5 for FN and found to have PIV3 on RVP. Patient reported that he began feeling chills PTA on 7/21 and checked his temperature, which was at 100.2F. Temperature then increased to 102F thirty minutes later. Patient also reports a dry cough for about 9 weeks. Vitals on admission include HR of 123, and a temperature of 102.6F. Pertinent labs on admission include WBC of 7.3 and ANC of 5.5. Differential diagnosis included sepsis, pneumonia, UTI, SSTI, intra-abdominal infection, or other etiology. Patient was started on cefepime and vancomycin and was admitted to BMT. Blood culture from Hsieh on 7/21 grew Pseudomonas aeruginosa with susceptibilities currently pending. Blood culture from right arm on 7/21 cultured gram negative rods. Repeat blood cultures on 7/22 from purple and red ports are NGTD. Vancomycin was discontinued after 1 dose on 7/21. UA from 7/21 was negative for nitrites and LE, and 1 WBC. UCx from 7/21 has no growth. RVP from 7/22 was positive for human rhinovirus. 7/21 Chest CT found new developing tree-in-bud nodularity involving the lingual, concerning for infection. 7/21 CT of sinus found increased layering frothy debris in the maxillary sinuses with slightly increased mucosal thickening/debris in the  ethmoid air cells since 5/30/19, suspicious for acute sinusitis in the proper clinical setting. Intermittent IV tobramycin doses have been given on 7/22 and 7/23 for Pseudomonas aeruginosa bacteremia. Hsieh was removed on 7/23.                   Active Anti-infective Medications   (From admission, onward)                       Start     Stop     07/22/19 1245   tobramycin (NEBCIN) 80 mg/2mL injection  5 mg/kg,   Intravenous,   ONCE     Bacteremia        --     07/22/19 0800   fluconazole  100 mg,   Oral,   DAILY     Fungal Infection Prophylaxis        --     07/22/19 0415   ceFEPIme  2 g,   Intravenous,   EVERY 8 HOURS     Febrile Neutropenia        --     07/21/19 2330   acyclovir  800 mg,   Oral,   2 TIMES DAILY     Cytomegalovirus Disease Pharmacoprophylaxis        --          Assessment:   [Pseudomonas aeruginosa bacteremia] - Patient has a central line that has been in for 67 days that grew Pseudomonas aeruginosa. IR removed line on 7/23. Repeat peripheral blood culture growing gram negative rods as well. Patient is receiving cefepime and intermittent IV tobramycin for double coverage of Pseudomonas aeruginosa. Patient has remained afebrile since admission, and is hemodynamically stable. Patient likely does not need double coverage for Pseudomonas aeruginosa at this time.  Recommend continuing cefepime at this time, and discontinuing tobramycin as this agent has increased risk of nephrotoxicity and ototoxicity. Recommend a 14 day course of cefepime for Pseudomonas aeruginosa bacteremia, follow up blood cultures from 2 sites for 2 days, and not replacing the line until both sets of cultures have remained negative for 48 hours.        Recommendations:  Continue cefepime  Discontinue tobramycin    Serial blood cultures from 2 sites for 2 days     Discussed with ID Staff  Dr. Maria Teresa Paz MD; Dr. Camille Tapia, PharmD    Brooke Coy, PharmD Candidate  Ext [phone]    Vital Signs/Clinical Features:  Vitals          07/21 0700  -  07/22 0659 07/22 0700  -  07/23 0659 07/23 0700  -  07/23 1540   Most Recent    Temp ( F) 98.3 -  102.6    97.8 -  99.5    97 -  98.9     98.9 (37.2)    Pulse 94 -  106      76 -  80     79    Heart Rate 84 -  123    70 -  84    78 -  88     82    Resp   16    16 -  18      16     16    /71 -  142/83    119/72 -  138/72    121/79 -  133/90     124/86    SpO2 (%) 93 -  99    92 -  95    93 -  97     96            Labs  Estimated Creatinine Clearance: 114.6 mL/min (based on SCr of 0.75 mg/dL).  Recent Labs   Lab Test 07/10/19  0839 07/15/19  0955 07/18/19  0820 07/21/19  1941 07/22/19  0505 07/23/19  0437   CR 0.92 0.92 0.89 1.01 0.84 0.75       Recent Labs   Lab Test 07/10/19  0839 07/15/19  0955 07/18/19  0820 07/21/19  1941 07/22/19  0505 07/22/19  1608 07/23/19  0437   WBC 3.3* 3.4* 2.9* 7.3 4.7  --  3.2*   ANEU 0.6* 1.0* 0.7* 5.5 3.1  --  1.5*   ALYM 1.9 1.6 1.6 1.0 0.9  --  0.9   NEGAR 0.7 0.7 0.5 0.6 0.6  --  0.6   AEOS 0.0 0.1 0.0 0.0 0.0  --  0.1   HGB 8.5* 8.4* 7.8* 9.5* 8.4*  --  8.5*   HCT 25.1* 25.1* 23.5* 29.2* 26.5*  --  26.2*   MCV 95 97 98 99 100  --  97   PLT 24* 26* 38* 23* 17* 21* 49*       Recent Labs   Lab Test 07/01/19  0854 07/08/19  1203 07/10/19  0839 07/15/19  0955 07/18/19  0820 07/21/19 1941   BILITOTAL 0.4 0.4 0.4 0.4 0.4 0.5   ALKPHOS 93 80 80 85 72 82   ALBUMIN 3.6 3.8 3.7 3.8 3.7 3.7   AST 18 20 19 26 25 22   ALT 18 19 19 19 20 19       Recent Labs   Lab Test 05/26/19 2058 06/01/19  1800 07/21/19 1941 07/21/19 1943   LACT 1.1 0.6* 1.4 1.2             Culture Results:  7-Day Micro Results       Procedure Component Value Units Date/Time    Catheter Tip Culture Aerobic Bacterial [[id number]] Collected:  07/23/19 1028    Order Status:  Completed Lab Status:  In process Updated:  07/23/19 1249    Specimen:  Catheter tip     Blood culture [[id number]] Collected:  07/23/19 0439    Order Status:  Completed Lab Status:  Preliminary result Updated:  07/23/19 1426    Specimen:   Blood from Central Line      Specimen Description Blood PURPLE PORT     Special Requests Received in aerobic bottle only     Culture Micro No growth after 7 hours    Vancomycin Resistant Enterococcus (VRE) Culture     Order Status:  No result Lab Status:  No result     Specimen:  Swab from Stool     Vancomycin Resistant Enterococcus (VRE) Culture [[id number]] Collected:  07/22/19 1609    Order Status:  Completed Lab Status:  Preliminary result Updated:  07/23/19 1009    Specimen:  Feces from Stool      Specimen Description Feces     Special Requests Specimen collected in eSwab transport (white cap)     Culture Micro Culture negative monitoring continues    Blood culture [[id number]] Collected:  07/22/19 1320    Order Status:  Completed Lab Status:  Preliminary result Updated:  07/23/19 1426    Specimen:  venous blood      Specimen Description Blood PURPLE PORT     Special Requests Received in aerobic bottle only     Culture Micro No growth after 22 hours    Blood culture [[id number]] Collected:  07/22/19 1320    Order Status:  Completed Lab Status:  Preliminary result Updated:  07/23/19 1426    Specimen:  venous blood      Specimen Description Blood Red port     Special Requests Received in aerobic bottle only     Culture Micro No growth after 22 hours    Respiratory Virus Panel by PCR [[id number]]  (Abnormal) Collected:  07/22/19 0600    Order Status:  Completed Lab Status:  Final result Updated:  07/22/19 1422    Specimen:  Nasopharyngeal      Respiratory Virus Source Nasopharyngeal     Influenza A Negative     Influenza A, H1 Negative     Influenza A, H3 Negative     Influenza A 2009 H1N1 Negative     Influenza B Negative     Respiratory Syncytial Virus A Negative     Respiratory Syncytial Virus B Negative     Parainfluenza Virus 1 Negative     Parainfluenza Virus 2 Negative     Parainfluenza Virus 3 Negative     Human Metapneumovirus Negative     Human Rhinovirus Positive     Comment: The human Rhinovirus positive result may  represent a possible cross reaction   with Enterovirus or a possible coinfection with Enterovirus.  Critical Value/Significant Value called to and read back by  JOSE NAVAS RN [id number] 7.22.19           Adenovirus Species B/E Negative     Adenovirus Species C Negative     Respiratory Virus Comment See Comment Below     Comment:    The [employer] Respiratory Viral Panel (RVP) is a qualitative, multiplex,   diagnostic test for simultaneous detection and identification of multiple   respiratory viral nucleic acids in individuals exhibiting signs and symptoms   of respiratory infection. It uses innovative eSensor technology to provide   sensitive and specific respiratory virus detection.  The test detects Influenza A (H1 and H3), Influenza A 2009 H1N1, Influenza B,   Respiratory Syncytial Virus A, Respiratory Syncytial Virus B, Parainfluenza   Virus (1, 2 and 3), Human Metapneumovirus, Human Rhinovirus (HRV), Adenovirus   B/E and Adenovirus C.  The assay has received FDA approval for the testing of nasopharyngeal (NP)   swabs only. The [facility] at [facility], [place], has validated the performance   characteristics of the [employer] Respiratory Viral Panel for NP swabs, bronchial   alveolar lavage/wash, nasopharyngeal aspirate/wash and nasal wash.          Respiratory Virus Panel by PCR     Order Status:  Canceled Lab Status:  No result     Specimen:  Nasopharyngeal     Urine Culture [[id number]] Collected:  07/21/19 2013    Order Status:  Completed Lab Status:  Final result Updated:  07/22/19 2141    Specimen:  Midstream Urine from Urine Midstream      Specimen Description Midstream Urine     Special Requests Specimen received in preservative     Culture Micro No growth    Blood culture [[id number]]  (Abnormal) Collected:  07/21/19 2006    Order Status:  Completed Lab Status:  Preliminary result Updated:  07/23/19 1022    Specimen:  Blood from Arm, Right       Specimen Description Blood Right Arm     Special Requests Received in aerobic bottle only     Culture Micro Cultured on the 1st day of incubation:  Pseudomonas aeruginosa        Critical Value/Significant Value, preliminary result only, called to and read back by  Rica Lo RN @3211 on 7/22/19 HJM        Susceptibility testing done on previous specimen    Blood culture [[id number]]  (Abnormal) Collected:  07/21/19 1941    Order Status:  Completed Lab Status:  Preliminary result Updated:  07/23/19 0412    Specimen:  Blood from Hsieh      Specimen Description Blood Hsieh Right Chest     Special Requests Received in aerobic bottle only     Culture Micro Cultured on the 1st day of incubation:  Pseudomonas aeruginosa        Critical Value/Significant Value, preliminary result only, called to and read back by  Ian Guevara RN @3324 7.22.19 AA        Susceptibility testing in progress      (Note)  POSITIVE for PSEUDOMONAS AERUGINOSA by [employer] multiplex nucleic  acid test. Final identification and antimicrobial susceptibility  testing will be verified by standard methods.    Specimen tested with Verigene multiplex, gram-negative blood culture  nucleic acid test for the following targets: Acinetobacter sp.,  Citrobacter sp., Enterobacter sp., Proteus sp., E. coli, K.  pneumoniae/oxytoca, P. aeruginosa, and the following resistance  markers: CTXM, KPC, NDM, VIM, IMP and OXA.    Critical Value/Significant Value called to and read back by Ian Guevara RN, 7/22/19 at 1439. JL              Recent Labs   Lab Test 01/14/19  0948 05/06/19  0936 05/26/19  2104 05/30/19  2228 07/21/19 2013   URINEPH 5.0 6.0 6.0 6.0 6.0   NITRITE Negative Negative Negative Negative Negative   LEUKEST Negative Negative Negative Negative Negative   WBCU 1 1  --  1 1             Recent Labs   Lab Test 07/22/19  0600   RVSPEC Nasopharyngeal   IFLUA Negative   FLUAH1 Negative   FLUAH3 Negative   ED0988 Negative   IFLUB Negative   RSVA  Negative   RSVB Negative   PIV1 Negative   PIV2 Negative   PIV3 Negative   HMPV Negative   HRVS Positive*   ADVBE Negative   ADVC Negative       Recent Labs   Lab Test 05/24/19  0730   CDBPCT Negative       Imaging: Xr Chest 2 Views    Result Date: 7/21/2019  EXAM: XR CHEST 2 VW  7/21/2019 7:58 PM HISTORY:  cough, fever COMPARISON: CT chest 5/30/2019, Chest radiograph dated 5/26/2019 FINDINGS: PA and lateral views of the chest are obtained. Right IJ central venous catheter tip projects over the low SVC. Trachea is midline. The cardiomediastinal silhouette is within normal limits. Pulmonary vasculature is distinct. No pleural effusion or pneumothorax. No focal pulmonary opacities. No acute bony abnormalities. The upper abdomen is unremarkable.     IMPRESSION: Clear lungs. I have personally reviewed the examination and initial interpretation and I agree with the findings. DEMARCO MARTINEZ MD    Ir Cvc Tunnel Removal Right    Result Date: 7/23/2019  PRE-PROCEDURE DIAGNOSIS: Bacteremia POST-PROCEDURE DIAGNOSIS: Same PROCEDURE: Removal of tunneled central venous catheter    IMPRESSION: Completed removal of tunneled central venous catheter. There were no complications. Catheter tip sent for culture ---------- CLINICAL HISTORY: The patient has a tunneled central venous catheter. Therapy was complete and catheter removal was requested. PERFORMED BY: Horacio Garrido PA-C MEDICATIONS: 1% lidocaine without epinephrine was available for local anesthesia. No intravenous medications for sedation were utilized. DESCRIPTION: Physical examination demonstrated no erythema, tenderness, fluctuance, or discharge at the catheter exit site or along the tract. The catheter and its entry site into the skin was prepped and draped in usual sterile fashion.  Using steady gentle traction, the catheter and cuff were freed from the subcutaneous tissue, and the entire catheter was removed without difficulty. Compression was applied over the venipuncture  site, as well as along the tract, until good hemostasis was achieved. A sterile dressing was applied to the skin at the exit site. COMPLICATIONS:  No immediate concerns; the patient remained stable throughout the procedure and tolerated it well. ESTIMATED BLOOD LOSS:  Minimal SPECIMENS:  Catheter removed per above TIME:  A total of 15 minutes was spent with the patient. Greater than 50% of the time was spent in counseling, education, and coordination of care. -------- INSTRUCTIONS GIVEN TO PATIENT:  Keep site clean, dry, and covered for 72 hours.  Change the dressing if it becomes soiled, wet, or blood soaked.  After 72 hours the dressing may be removed and the site may be left uncovered and may get wet if the site has sealed.  If the site has not sealed, keep it covered and dry with daily dressing changes until sealed.  Monitor the neck over the collar bone for swelling and the chest exit site for bleeding or infection as instructed. Telephone Interventional Radiology daytime [phone], or [facility]  after hours [phone] (and ask for IR on-call), if problems or concerns develop.  If the site bleeds, sit upright and hold pressure on the site and above the collar bone for 10 minutes. If it continues to bleed, continue holding pressure and telephone Interventional Radiology at the number(s) above. CORINA ROBBINS PA-C    Ct Chest W/o Contrast    Result Date: 7/22/2019  EXAMINATION: Chest CT  7/21/2019 10:57 PM CLINICAL HISTORY: recurrent neutropenic fevers; BMT patient; persistent chronic cough; CXR was clear COMPARISON: 5/30/2019. TECHNIQUE: CT imaging obtained through the chest without contrast. Axial, coronal, and sagittal reconstructions and axial MIP reformatted images are reviewed. FINDINGS: Lines and tubes: Right IJ central venous catheter tip terminates at the superior cavoatrial junction. Lungs: New tree-in-bud nodularity in the lingula. No consolidative airspace opacities. No pleural effusion or  pneumothorax. Segmental and subsegmental groundglass opacities, likely representing atelectasis. Calcified granuloma. Mild central bronchial wall thickening. The central tracheobronchial tree is patent. Mediastinum: The visualized thyroid gland is within normal limits. Heart size is normal. No pericardial effusion.  Normal thoracic vasculature. Hypoattenuation of the blood pool, suggestive of anemia. Calcified mediastinal and right hilar nodes. No thoracic lymphadenopathy. Normal esophagus. Upper Abdomen: Limited evaluation of the upper abdomen demonstrates no acute pathology. Bones and soft tissues: Multilevel thoracolumbar wedge compression deformities, not substantially changed from prior. No acute or suspicious appearing bony abnormalities. Bilateral gynecomastia.     IMPRESSION: 1. New developing tree-in-bud nodularity involving the lingula, concerning for infection. 2. Mild thickening of the airways as can be seen in bronchitis/bronchitis. 3. Evidence of prior granulomatous disease. I have personally reviewed the examination and initial interpretation and I agree with the findings. EDWARD CAPONE MD    Ct Sinus W/o Contrast    Result Date: 7/22/2019  Paranasal sinus CT without contrast History:  recurrent neutropenic fevers; BMT patient; hx of pansinusitis on CT 5/30; eval for interval change. Comparison:  5/30/2019 Technique:  Images through the paranasal sinuses without intravenous contrast with coronal reconstructions. Findings:   Maxillary sinuses: Bilateral coastal thickening with increased layering frothy debris.  Frontal sinuses: clear bilaterally.  Ethmoid air cells: Scattered mucosal thickening/debris, slightly increased from prior. Sphenoid sinus: clear. Mucosal thickening and obstruction of the ostiomeatal units. The bony walls of the paranasal sinuses are intact. There is mild leftward deviation of the middle third of the nasal septum. Left mandibular torus. Mastoid air cells are clear. The adenoid  tonsils in the nasopharynx are unremarkable.     Impression:  Increased layering frothy debris in the maxillary sinuses with slightly increased mucosal thickening/debris in the ethmoid air cells since 5/30/2019, suspicious for acute sinusitis in the proper clinical setting. I have personally reviewed the examination and initial interpretation and I agree with the findings. KESHAWN CARTER MD

## 2019-07-23 NOTE — PLAN OF CARE
Pt was afebrile.  Has frequent non-productive cough and crackles in his left base.  Started on incentive spirometry.  On night shift, will need 2 doses of platelets, then a platelet recheck.  Then platelets with be replaced if < 50K for removal of central line in am.  Pt denied nausea, ate well and denied pain.  Blood cultures were called back as positive - gram neg rods from peripheral draw.  Notifying HO on call.  Note that pt has not been saving his urine - will request that he save it starting midnight.   Problem: Infection  Goal: Infection Symptom Resolution  7/22/2019 2132 by Rica Lo, RN  Outcome: Improving

## 2019-07-24 LAB
ABO + RH BLD: ABNORMAL
ABO + RH BLD: ABNORMAL
ANION GAP SERPL CALCULATED.3IONS-SCNC: 5 MMOL/L (ref 3–14)
BACTERIA SPEC CULT: ABNORMAL
BACTERIA SPEC CULT: NORMAL
BASOPHILS # BLD AUTO: 0 10E9/L (ref 0–0.2)
BASOPHILS NFR BLD AUTO: 0 %
BLD GP AB SCN SERPL QL: ABNORMAL
BLOOD BANK CMNT PATIENT-IMP: ABNORMAL
BLOOD BANK CMNT PATIENT-IMP: ABNORMAL
BUN SERPL-MCNC: 12 MG/DL (ref 7–30)
CALCIUM SERPL-MCNC: 8.5 MG/DL (ref 8.5–10.1)
CHLORIDE SERPL-SCNC: 110 MMOL/L (ref 94–109)
CO2 SERPL-SCNC: 26 MMOL/L (ref 20–32)
CREAT SERPL-MCNC: 0.79 MG/DL (ref 0.66–1.25)
DIFFERENTIAL METHOD BLD: ABNORMAL
EOSINOPHIL # BLD AUTO: 0.1 10E9/L (ref 0–0.7)
EOSINOPHIL NFR BLD AUTO: 5.1 %
ERYTHROCYTE [DISTWIDTH] IN BLOOD BY AUTOMATED COUNT: 21.3 % (ref 10–15)
GFR SERPL CREATININE-BSD FRML MDRD: >90 ML/MIN/{1.73_M2}
GLUCOSE SERPL-MCNC: 93 MG/DL (ref 70–99)
HCT VFR BLD AUTO: 27.9 % (ref 40–53)
HGB BLD-MCNC: 8.8 G/DL (ref 13.3–17.7)
IMM GRANULOCYTES # BLD: 0 10E9/L (ref 0–0.4)
IMM GRANULOCYTES NFR BLD: 0 %
LYMPHOCYTES # BLD AUTO: 1 10E9/L (ref 0.8–5.3)
LYMPHOCYTES NFR BLD AUTO: 45.6 %
Lab: ABNORMAL
Lab: ABNORMAL
Lab: NORMAL
MCH RBC QN AUTO: 31.7 PG (ref 26.5–33)
MCHC RBC AUTO-ENTMCNC: 31.5 G/DL (ref 31.5–36.5)
MCV RBC AUTO: 100 FL (ref 78–100)
MONOCYTES # BLD AUTO: 0.5 10E9/L (ref 0–1.3)
MONOCYTES NFR BLD AUTO: 25.1 %
NEUTROPHILS # BLD AUTO: 0.5 10E9/L (ref 1.6–8.3)
NEUTROPHILS NFR BLD AUTO: 24.2 %
NRBC # BLD AUTO: 0 10*3/UL
NRBC BLD AUTO-RTO: 0 /100
PLATELET # BLD AUTO: 52 10E9/L (ref 150–450)
PLATELET # BLD EST: ABNORMAL 10*3/UL
POTASSIUM SERPL-SCNC: 3.9 MMOL/L (ref 3.4–5.3)
RBC # BLD AUTO: 2.78 10E12/L (ref 4.4–5.9)
SODIUM SERPL-SCNC: 141 MMOL/L (ref 133–144)
SPECIMEN EXP DATE BLD: ABNORMAL
SPECIMEN SOURCE: ABNORMAL
SPECIMEN SOURCE: ABNORMAL
SPECIMEN SOURCE: NORMAL
WBC # BLD AUTO: 2.2 10E9/L (ref 4–11)

## 2019-07-24 PROCEDURE — 85025 COMPLETE CBC W/AUTO DIFF WBC: CPT | Performed by: INTERNAL MEDICINE

## 2019-07-24 PROCEDURE — 25000128 H RX IP 250 OP 636: Performed by: INTERNAL MEDICINE

## 2019-07-24 PROCEDURE — 86901 BLOOD TYPING SEROLOGIC RH(D): CPT | Performed by: INTERNAL MEDICINE

## 2019-07-24 PROCEDURE — 25000132 ZZH RX MED GY IP 250 OP 250 PS 637: Performed by: INTERNAL MEDICINE

## 2019-07-24 PROCEDURE — 80048 BASIC METABOLIC PNL TOTAL CA: CPT | Performed by: INTERNAL MEDICINE

## 2019-07-24 PROCEDURE — 36415 COLL VENOUS BLD VENIPUNCTURE: CPT | Performed by: INTERNAL MEDICINE

## 2019-07-24 PROCEDURE — 20600000 ZZH R&B BMT

## 2019-07-24 PROCEDURE — 86850 RBC ANTIBODY SCREEN: CPT | Performed by: INTERNAL MEDICINE

## 2019-07-24 PROCEDURE — 25000128 H RX IP 250 OP 636: Performed by: STUDENT IN AN ORGANIZED HEALTH CARE EDUCATION/TRAINING PROGRAM

## 2019-07-24 PROCEDURE — 86900 BLOOD TYPING SEROLOGIC ABO: CPT | Performed by: INTERNAL MEDICINE

## 2019-07-24 PROCEDURE — 87040 BLOOD CULTURE FOR BACTERIA: CPT | Performed by: INTERNAL MEDICINE

## 2019-07-24 RX ADMIN — ACYCLOVIR 800 MG: 800 TABLET ORAL at 20:17

## 2019-07-24 RX ADMIN — ACYCLOVIR 800 MG: 800 TABLET ORAL at 08:50

## 2019-07-24 RX ADMIN — FILGRASTIM 480 MCG: 480 INJECTION, SOLUTION INTRAVENOUS; SUBCUTANEOUS at 17:55

## 2019-07-24 RX ADMIN — FLUCONAZOLE 100 MG: 100 TABLET ORAL at 08:50

## 2019-07-24 RX ADMIN — CEFEPIME HYDROCHLORIDE 2 G: 2 INJECTION, POWDER, FOR SOLUTION INTRAVENOUS at 20:17

## 2019-07-24 RX ADMIN — CEFEPIME HYDROCHLORIDE 2 G: 2 INJECTION, POWDER, FOR SOLUTION INTRAVENOUS at 03:53

## 2019-07-24 RX ADMIN — MELATONIN 2000 UNITS: at 08:50

## 2019-07-24 RX ADMIN — Medication 1200 MG: at 08:49

## 2019-07-24 RX ADMIN — CEFEPIME HYDROCHLORIDE 2 G: 2 INJECTION, POWDER, FOR SOLUTION INTRAVENOUS at 11:28

## 2019-07-24 RX ADMIN — PAROXETINE HYDROCHLORIDE HEMIHYDRATE 20 MG: 20 TABLET, FILM COATED ORAL at 08:50

## 2019-07-24 ASSESSMENT — ACTIVITIES OF DAILY LIVING (ADL)
ADLS_ACUITY_SCORE: 10

## 2019-07-24 ASSESSMENT — MIFFLIN-ST. JEOR: SCORE: 1572.38

## 2019-07-24 NOTE — PLAN OF CARE
AVSS on room air. Pt denies pain or nausea. Neupogen given this evening. Pt showered this afternoon and dressing changed to jennings removal site, site CDI. Independent in room and halls. Plan for possible PICC placement and discharge tomorrow with home infusion, pending no positive blood cultures from today.    Problem: Adult Inpatient Plan of Care  Goal: Plan of Care Review  7/24/2019 1845 by Livia Degroot RN  Outcome: Improving  7/24/2019 0659 by Latrice Miranda RN  Outcome: No Change  Goal: Patient-Specific Goal (Individualization)  7/24/2019 1845 by Livia Degroot RN  Outcome: Improving  7/24/2019 0659 by Latrice Miranda RN  Outcome: No Change  Goal: Absence of Hospital-Acquired Illness or Injury  7/24/2019 1845 by Livia Degroot RN  Outcome: Improving  7/24/2019 0659 by Latrice Miranda RN  Outcome: No Change  Goal: Optimal Comfort and Wellbeing  7/24/2019 1845 by Livia Degroot RN  Outcome: Improving  7/24/2019 0659 by Latrice Miranda RN  Outcome: No Change  Goal: Readiness for Transition of Care  7/24/2019 1845 by Livia Degroot RN  Outcome: Improving  7/24/2019 0659 by Latrice Miranda RN  Outcome: No Change  Goal: Rounds/Family Conference  7/24/2019 1845 by Livia Degroot RN  Outcome: Improving  7/24/2019 0659 by Latrice Miranda RN  Outcome: No Change     Problem: Infection  Goal: Infection Symptom Resolution  7/24/2019 1845 by Livia Degroot RN  Outcome: Improving  7/24/2019 0659 by Latrice Miranda RN  Outcome: No Change     Problem: Adjustment to Transplant (Stem Cell/Bone Marrow Transplant)  Goal: Optimal Coping with Transplant  7/24/2019 1845 by Livia Degroot RN  Outcome: Improving  7/24/2019 0659 by Latrice Miranda RN  Outcome: No Change     Problem: Fatigue (Stem Cell/Bone Marrow Transplant)  Goal: Energy Level Supports Daily Activity  7/24/2019 1845 by Livia Degroot RN  Outcome: Improving  7/24/2019 0659 by Latrice Miranda RN  Outcome: No Change     Problem: Infection Risk (Stem  Cell/Bone Marrow Transplant)  Goal: Absence of Infection Signs/Symptoms  7/24/2019 1845 by Livia Degroot RN  Outcome: Improving  7/24/2019 0659 by Latrice Miranda, RN  Outcome: No Change

## 2019-07-24 NOTE — PROGRESS NOTES
"BMT Daily Progress Note   07/24/2019    Patient ID:   Angel Yanez is a 58 yo man Day +68 s/p 2nd auto for IgM kappa MM .   Diagnosis MM Multiple myeloma  HCT Type Autologous    Prep Regimen Cytoxan  Melphalan   Donor Source Self     No  Primary BMT Provider Dr Lucio     Angel was admitted on 7/21/2019 for fevers    INTERVAL  HISTORY     Guille is feeling well this morning. Cough is about the same, no issues sleeping overnight. Walking the halls. No sinus pain. He denies shortness of breath.     Review of Systems: 10 point ROS negative except as noted above.      PHYSICAL EXAM     Weight In/Out     Wt Readings from Last 3 Encounters:   07/24/19 77.7 kg (171 lb 4.8 oz)   07/18/19 79.2 kg (174 lb 11.2 oz)   07/15/19 78.9 kg (174 lb)      I/O last 3 completed shifts:  In: 2257 [P.O.:1280; I.V.:775]  Out: 3425 [Urine:3425]       /75 (BP Location: Right arm)   Pulse 83   Temp 97.3  F (36.3  C) (Axillary)   Resp 16   Ht 1.753 m (5' 9\")   Wt 77.7 kg (171 lb 4.8 oz)   SpO2 92%   BMI 25.30 kg/m       General: NAD, pleasant  Eyes:  sclera anicteric and not injected  Nose/Mouth/Throat: OP moist, no ulcerations   Lungs: CTA bilaterally, no wheezes appreciated, some crackles bilaterally. Dry sounding cough  Cardiovascular: regular rate and rhythm, no M/R/G   Abdominal/Rectal: +BS, soft, NT, ND   Lymphatics: no edema  Skin: no rash or petechiae; site of right jennings removed 7/23 without blood or discharge on gauze (not removed 7/24)  Neuro: non-focal, no gross deficits  Additional Findings: Right hand peripheral IV    LABS AND IMAGING - PAST 24 HOURS     Results for orders placed or performed during the hospital encounter of 07/21/19 (from the past 24 hour(s))   Catheter Tip Culture Aerobic Bacterial   Result Value Ref Range    Specimen Description Catheter tip     Culture Micro Culture negative monitoring continues    IR CVC Tunnel Removal Right    Narrative    PRE-PROCEDURE DIAGNOSIS: " Bacteremia    POST-PROCEDURE DIAGNOSIS: Same    PROCEDURE: Removal of tunneled central venous catheter    Impression    IMPRESSION: Completed removal of tunneled central venous catheter.  There were no complications. Catheter tip sent for culture    ----------    CLINICAL HISTORY: The patient has a tunneled central venous catheter.  Therapy was complete and catheter removal was requested.    PERFORMED BY: Horacio Garrido PA-C    MEDICATIONS: 1% lidocaine without epinephrine was available for local  anesthesia. No intravenous medications for sedation were utilized.    DESCRIPTION: Physical examination demonstrated no erythema,  tenderness, fluctuance, or discharge at the catheter exit site or  along the tract. The catheter and its entry site into the skin was  prepped and draped in usual sterile fashion.      Using steady gentle traction, the catheter and cuff were freed from  the subcutaneous tissue, and the entire catheter was removed without  difficulty. Compression was applied over the venipuncture site, as  well as along the tract, until good hemostasis was achieved. A sterile  dressing was applied to the skin at the exit site.    COMPLICATIONS:  No immediate concerns; the patient remained stable  throughout the procedure and tolerated it well.    ESTIMATED BLOOD LOSS:  Minimal    SPECIMENS:  Catheter removed per above    TIME:  A total of 15 minutes was spent with the patient. Greater than  50% of the time was spent in counseling, education, and coordination  of care.    --------    INSTRUCTIONS GIVEN TO PATIENT:  Keep site clean, dry, and covered for  72 hours.  Change the dressing if it becomes soiled, wet, or blood  soaked.  After 72 hours the dressing may be removed and the site may  be left uncovered and may get wet if the site has sealed.  If the site  has not sealed, keep it covered and dry with daily dressing changes  until sealed.  Monitor the neck over the collar bone for swelling and  the chest exit  site for bleeding or infection as instructed.   Telephone Interventional Radiology daytime [phone], or [facility]   after hours [phone] (and ask for IR on-call), if  problems or concerns develop.  If the site bleeds, sit upright and  hold pressure on the site and above the collar bone for 10 minutes.   If it continues to bleed, continue holding pressure and telephone  Interventional Radiology at the number(s) above.    CORINA ROBBINS PA-C   Basic metabolic panel   Result Value Ref Range    Sodium 141 133 - 144 mmol/L    Potassium 3.9 3.4 - 5.3 mmol/L    Chloride 110 (H) 94 - 109 mmol/L    Carbon Dioxide 26 20 - 32 mmol/L    Anion Gap 5 3 - 14 mmol/L    Glucose 93 70 - 99 mg/dL    Urea Nitrogen 12 7 - 30 mg/dL    Creatinine 0.79 0.66 - 1.25 mg/dL    GFR Estimate >90 >60 mL/min/[1.73_m2]    GFR Estimate If Black >90 >60 mL/min/[1.73_m2]    Calcium 8.5 8.5 - 10.1 mg/dL   Blood culture   Result Value Ref Range    Specimen Description Blood Right Arm     Special Requests Received in aerobic bottle only     Culture Micro PENDING    CBC with platelets differential   Result Value Ref Range    WBC 2.2 (L) 4.0 - 11.0 10e9/L    RBC Count 2.78 (L) 4.4 - 5.9 10e12/L    Hemoglobin 8.8 (L) 13.3 - 17.7 g/dL    Hematocrit 27.9 (L) 40.0 - 53.0 %     78 - 100 fl    MCH 31.7 26.5 - 33.0 pg    MCHC 31.5 31.5 - 36.5 g/dL    RDW 21.3 (H) 10.0 - 15.0 %    Platelet Count 52 (L) 150 - 450 10e9/L    Diff Method Automated Method     % Neutrophils 24.2 %    % Lymphocytes 45.6 %    % Monocytes 25.1 %    % Eosinophils 5.1 %    % Basophils 0.0 %    % Immature Granulocytes 0.0 %    Nucleated RBCs 0 0 /100    Absolute Neutrophil 0.5 (L) 1.6 - 8.3 10e9/L    Absolute Lymphocytes 1.0 0.8 - 5.3 10e9/L    Absolute Monocytes 0.5 0.0 - 1.3 10e9/L    Absolute Eosinophils 0.1 0.0 - 0.7 10e9/L    Absolute Basophils 0.0 0.0 - 0.2 10e9/L    Abs Immature Granulocytes 0.0 0 - 0.4 10e9/L    Absolute Nucleated RBC 0.0     Platelet Estimate  Confirming automated cell count    ABO/Rh type and screen   Result Value Ref Range    ABO O     RH(D) Pos     Antibody Screen Pos (A)     Test Valid Only At          [facility]    Specimen Expires 07/27/2019          OVERALL PLAN   Angel Yanez is a 58 yo man Day +68 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  BMT/MM:   Slow engraftment; cell dose was only 0.639x10^6. ([facility] [place] - known poor cell dose as failed chemo-mobilization 1/2019.)   - last dose GCSF 7/3 and 7/10 and then 7/18, again today 7/24  - BM BX 6/11/2019:  No morphologic or immunophenotypic evidence of recurrent/persistent  plasma cell neoplasm    2.  HEME: Keep Hgb>8g/dL, plt >10.   - Line removal 7/23, plt to 52k after 3 bags 7/23.   - ongoing cytopenias - low cell dose with transplant + viral URI?   - Pt has RBC antibodies. Antibody work up completed 5/26. Hospitalist Confirmed with BB that the patient has no clinically significant antibodies     3.  ID: No fever since 7/21  - Hospitalist ordered CT chest which shows some new tree and bud which could be viral.  Also sinus CT with some frothy debris but no real sinus sx.  Continue cefepime for now.  No ENT consult.  - 7/21 Bacteremia( 2/2 bc+) 7/22 and 7/23 bc are neg. Pseudomonas( positive by verigene) tobra daily through 7/23, jennings removed 7/23. Cont cefepime as sensitivities back 7/24. If peripheral bld cx 7/24 neg tomorrow, would place PICC and discharge with home infusion (coverage confirmed)  - RVP from 7/21 is positive for rhino virus.  Symptomatic management.   - +parainfluenza 3 (5/30). Ongoing cough, no hemoptysis.  Cough drops, tessalon pearls prn. 7/21 PIV3=neg  - prophy with ACV, Fluconazole, Pentamidine (7/15). Hold azithromycin will on cefepime  - CMV neg 7/15. On CMV vaccine trial. ANC 1.0, study requires >1; discussed with Dr Mukherjee, appropriate to still give as planned     4.  GI:  No complaints.  - GI prophy- omeprazole.        5.  FEN/Renal: Creat, lytes wnl. Eating well.   - Creat=0.79, on 50mL/hr NS fluids; end 7/24.     6.  Mood:  Cont Paxil    Dispo: discharge to home with IV home infusion coverage after PICC placement 7/25.    Mony Mcgarryl PAC  [phone]    The patient was seen and examined by me separately from the midlevel provider. The note reflects our mutual assessment and plans and were approved by me personally.   I personally reviewed today's lab results vital signs and radiology results.    Each point of the assessment and plan were reviewed by the midlevel and me and either endorsed by me or were my added decisions.    My pertinent physical findings today are: Now afebrile, clear lungs and no rash.    My assessment and plan are: 60 yo day 61 post second BMT for myeloma dmitted for fever. Stable and lucent today: await culture results. Cefepime and vanco for now: BC + for Gm- rods: add tobra and remove Hsieh. Redose with tobra pending sensitivities. If culture tomorrow is negative, we can insert a PICC and send him home on home antibiotic infusions.    Stephan Godwin M.D.

## 2019-07-24 NOTE — PLAN OF CARE
VSS. Afebrile. Up ad mickey. No PRNs given overnight. No replacements needed this AM. Continue to monitor.    Problem: Adult Inpatient Plan of Care  Goal: Plan of Care Review  7/24/2019 0659 by Latrice Miranda RN  Outcome: No Change     Problem: Adult Inpatient Plan of Care  Goal: Patient-Specific Goal (Individualization)  7/24/2019 0659 by Latrice Miranda RN  Outcome: No Change     Problem: Adult Inpatient Plan of Care  Goal: Absence of Hospital-Acquired Illness or Injury  7/24/2019 0659 by Latrice Miranda RN  Outcome: No Change     Problem: Adult Inpatient Plan of Care  Goal: Optimal Comfort and Wellbeing  7/24/2019 0659 by Latrice Miranda RN  Outcome: No Change     Problem: Adult Inpatient Plan of Care  Goal: Readiness for Transition of Care  7/24/2019 0659 by Latrice Miranda RN  Outcome: No Change     Problem: Adult Inpatient Plan of Care  Goal: Rounds/Family Conference  7/24/2019 0659 by Latrice Miranda RN  Outcome: No Change     Problem: Infection  Goal: Infection Symptom Resolution  7/24/2019 0659 by Latrice Miranda RN  Outcome: No Change     Problem: Adjustment to Transplant (Stem Cell/Bone Marrow Transplant)  Goal: Optimal Coping with Transplant  7/24/2019 0659 by Latrice Miranda RN  Outcome: No Change     Problem: Fatigue (Stem Cell/Bone Marrow Transplant)  Goal: Energy Level Supports Daily Activity  7/24/2019 0659 by Latrice Miranda RN  Outcome: No Change     Problem: Infection Risk (Stem Cell/Bone Marrow Transplant)  Goal: Absence of Infection Signs/Symptoms  7/24/2019 0659 by Latrice Miranda RN  Outcome: No Change

## 2019-07-25 ENCOUNTER — CARE COORDINATION (OUTPATIENT)
Dept: TRANSPLANT | Facility: CLINIC | Age: 59
End: 2019-07-25

## 2019-07-25 ENCOUNTER — HOME INFUSION (PRE-WILLOW HOME INFUSION) (OUTPATIENT)
Dept: PHARMACY | Facility: CLINIC | Age: 59
End: 2019-07-25

## 2019-07-25 ENCOUNTER — APPOINTMENT (OUTPATIENT)
Dept: GENERAL RADIOLOGY | Facility: CLINIC | Age: 59
DRG: 872 | End: 2019-07-25
Attending: STUDENT IN AN ORGANIZED HEALTH CARE EDUCATION/TRAINING PROGRAM
Payer: COMMERCIAL

## 2019-07-25 VITALS
TEMPERATURE: 98.2 F | BODY MASS INDEX: 25.33 KG/M2 | HEIGHT: 69 IN | DIASTOLIC BLOOD PRESSURE: 79 MMHG | OXYGEN SATURATION: 100 % | WEIGHT: 171 LBS | RESPIRATION RATE: 16 BRPM | SYSTOLIC BLOOD PRESSURE: 100 MMHG | HEART RATE: 78 BPM

## 2019-07-25 LAB
ANION GAP SERPL CALCULATED.3IONS-SCNC: 6 MMOL/L (ref 3–14)
ANISOCYTOSIS BLD QL SMEAR: ABNORMAL
BACTERIA SPEC CULT: ABNORMAL
BACTERIA SPEC CULT: NO GROWTH
BASOPHILS # BLD AUTO: 0 10E9/L (ref 0–0.2)
BASOPHILS NFR BLD AUTO: 0 %
BUN SERPL-MCNC: 12 MG/DL (ref 7–30)
CALCIUM SERPL-MCNC: 9 MG/DL (ref 8.5–10.1)
CHLORIDE SERPL-SCNC: 109 MMOL/L (ref 94–109)
CO2 SERPL-SCNC: 25 MMOL/L (ref 20–32)
CREAT SERPL-MCNC: 0.79 MG/DL (ref 0.66–1.25)
DIFFERENTIAL METHOD BLD: ABNORMAL
EOSINOPHIL # BLD AUTO: 0 10E9/L (ref 0–0.7)
EOSINOPHIL NFR BLD AUTO: 0 %
ERYTHROCYTE [DISTWIDTH] IN BLOOD BY AUTOMATED COUNT: 21.9 % (ref 10–15)
GFR SERPL CREATININE-BSD FRML MDRD: >90 ML/MIN/{1.73_M2}
GLUCOSE SERPL-MCNC: 84 MG/DL (ref 70–99)
HCT VFR BLD AUTO: 28.2 % (ref 40–53)
HGB BLD-MCNC: 9.2 G/DL (ref 13.3–17.7)
LYMPHOCYTES # BLD AUTO: 1.3 10E9/L (ref 0.8–5.3)
LYMPHOCYTES NFR BLD AUTO: 14.7 %
Lab: ABNORMAL
MCH RBC QN AUTO: 32.3 PG (ref 26.5–33)
MCHC RBC AUTO-ENTMCNC: 32.6 G/DL (ref 31.5–36.5)
MCV RBC AUTO: 99 FL (ref 78–100)
MONOCYTES # BLD AUTO: 0.8 10E9/L (ref 0–1.3)
MONOCYTES NFR BLD AUTO: 9.5 %
NEUTROPHILS # BLD AUTO: 6.7 10E9/L (ref 1.6–8.3)
NEUTROPHILS NFR BLD AUTO: 75.8 %
OVALOCYTES BLD QL SMEAR: SLIGHT
PLATELET # BLD AUTO: 45 10E9/L (ref 150–450)
PLATELET # BLD EST: ABNORMAL 10*3/UL
POIKILOCYTOSIS BLD QL SMEAR: SLIGHT
POTASSIUM SERPL-SCNC: 3.7 MMOL/L (ref 3.4–5.3)
RBC # BLD AUTO: 2.85 10E12/L (ref 4.4–5.9)
SODIUM SERPL-SCNC: 140 MMOL/L (ref 133–144)
SPECIMEN SOURCE: ABNORMAL
SPECIMEN SOURCE: NORMAL
WBC # BLD AUTO: 8.8 10E9/L (ref 4–11)

## 2019-07-25 PROCEDURE — 27211417 ZZ H KIT, VPS RHYTHM STYLET

## 2019-07-25 PROCEDURE — 36415 COLL VENOUS BLD VENIPUNCTURE: CPT | Performed by: STUDENT IN AN ORGANIZED HEALTH CARE EDUCATION/TRAINING PROGRAM

## 2019-07-25 PROCEDURE — 87040 BLOOD CULTURE FOR BACTERIA: CPT | Performed by: STUDENT IN AN ORGANIZED HEALTH CARE EDUCATION/TRAINING PROGRAM

## 2019-07-25 PROCEDURE — 40000986 XR CHEST PORT 1 VW

## 2019-07-25 PROCEDURE — 85025 COMPLETE CBC W/AUTO DIFF WBC: CPT | Performed by: INTERNAL MEDICINE

## 2019-07-25 PROCEDURE — 80048 BASIC METABOLIC PNL TOTAL CA: CPT | Performed by: INTERNAL MEDICINE

## 2019-07-25 PROCEDURE — 25000132 ZZH RX MED GY IP 250 OP 250 PS 637: Performed by: INTERNAL MEDICINE

## 2019-07-25 PROCEDURE — 36415 COLL VENOUS BLD VENIPUNCTURE: CPT | Performed by: INTERNAL MEDICINE

## 2019-07-25 PROCEDURE — C1751 CATH, INF, PER/CENT/MIDLINE: HCPCS

## 2019-07-25 PROCEDURE — 36569 INSJ PICC 5 YR+ W/O IMAGING: CPT

## 2019-07-25 PROCEDURE — 02HV33Z INSERTION OF INFUSION DEVICE INTO SUPERIOR VENA CAVA, PERCUTANEOUS APPROACH: ICD-10-PCS | Performed by: PHYSICIAN ASSISTANT

## 2019-07-25 PROCEDURE — 25000128 H RX IP 250 OP 636: Performed by: INTERNAL MEDICINE

## 2019-07-25 PROCEDURE — 25000125 ZZHC RX 250: Performed by: STUDENT IN AN ORGANIZED HEALTH CARE EDUCATION/TRAINING PROGRAM

## 2019-07-25 RX ORDER — HEPARIN SODIUM,PORCINE 10 UNIT/ML
2-5 VIAL (ML) INTRAVENOUS
Status: DISCONTINUED | OUTPATIENT
Start: 2019-07-25 | End: 2019-07-25 | Stop reason: HOSPADM

## 2019-07-25 RX ORDER — CODEINE PHOSPHATE AND GUAIFENESIN 10; 100 MG/5ML; MG/5ML
5-10 SOLUTION ORAL EVERY 4 HOURS PRN
Qty: 118 ML | Refills: 0 | Status: SHIPPED | OUTPATIENT
Start: 2019-07-25 | End: 2019-07-25

## 2019-07-25 RX ORDER — LIDOCAINE 40 MG/G
CREAM TOPICAL
Status: DISCONTINUED | OUTPATIENT
Start: 2019-07-25 | End: 2019-07-25 | Stop reason: HOSPADM

## 2019-07-25 RX ORDER — HEPARIN SODIUM,PORCINE 10 UNIT/ML
10 VIAL (ML) INTRAVENOUS
Qty: 30 VIAL | Refills: 0 | Status: SHIPPED | OUTPATIENT
Start: 2019-07-25 | End: 2019-08-27

## 2019-07-25 RX ADMIN — ACYCLOVIR 800 MG: 800 TABLET ORAL at 10:03

## 2019-07-25 RX ADMIN — Medication 1200 MG: at 10:03

## 2019-07-25 RX ADMIN — LIDOCAINE HYDROCHLORIDE 5 ML: 10 INJECTION, SOLUTION EPIDURAL; INFILTRATION; INTRACAUDAL; PERINEURAL at 08:39

## 2019-07-25 RX ADMIN — CEFEPIME HYDROCHLORIDE 2 G: 2 INJECTION, POWDER, FOR SOLUTION INTRAVENOUS at 04:24

## 2019-07-25 RX ADMIN — FLUCONAZOLE 100 MG: 100 TABLET ORAL at 10:03

## 2019-07-25 RX ADMIN — PAROXETINE HYDROCHLORIDE HEMIHYDRATE 20 MG: 20 TABLET, FILM COATED ORAL at 10:03

## 2019-07-25 RX ADMIN — MELATONIN 2000 UNITS: at 10:03

## 2019-07-25 RX ADMIN — CEFEPIME HYDROCHLORIDE 2 G: 2 INJECTION, POWDER, FOR SOLUTION INTRAVENOUS at 14:25

## 2019-07-25 ASSESSMENT — ACTIVITIES OF DAILY LIVING (ADL)
ADLS_ACUITY_SCORE: 10

## 2019-07-25 ASSESSMENT — ENCOUNTER SYMPTOMS: FEVER: 1

## 2019-07-25 ASSESSMENT — MIFFLIN-ST. JEOR: SCORE: 1571.03

## 2019-07-25 NOTE — PLAN OF CARE
AVSS on room air. Pt denies pain or nausea. Peripheral blood culture and blood culture from new PICC drawn this morning. PICC placed this morning. Home infusion teaching completed. Independent in room and halls. Plan for discharge home this afternoon, no new meds from discharge pharmacy. Follow-up appt tomorrow at 1330. Pt is awaiting ride from his wife after work, he suspects around 5-6pm tonight.    Problem: Adult Inpatient Plan of Care  Goal: Plan of Care Review  Outcome: Improving  Goal: Patient-Specific Goal (Individualization)  Outcome: Improving  Goal: Absence of Hospital-Acquired Illness or Injury  Outcome: Improving  Goal: Optimal Comfort and Wellbeing  Outcome: Improving  Goal: Readiness for Transition of Care  Outcome: Improving  Goal: Rounds/Family Conference  Outcome: Improving     Problem: Infection  Goal: Infection Symptom Resolution  Outcome: Improving     Problem: Adjustment to Transplant (Stem Cell/Bone Marrow Transplant)  Goal: Optimal Coping with Transplant  Outcome: Improving     Problem: Fatigue (Stem Cell/Bone Marrow Transplant)  Goal: Energy Level Supports Daily Activity  Outcome: Improving     Problem: Infection Risk (Stem Cell/Bone Marrow Transplant)  Goal: Absence of Infection Signs/Symptoms  Outcome: Improving

## 2019-07-25 NOTE — SUMMARY OF CARE
BMT Summary of Care    This note has data from a flowsheet    July 25, 2019 9:16 AM  Angel Yanez  MRN: [id number]    Discharge Date: 7/25/2019     BMT Primary Physician: Adebayo Lucio MD    BMT Nurse Coordinator: Su Pate RN    Discharge Diagnosis: S/P readmission for fever, pseudomonal bacteremia     Discharge To: home    Activity: no restrictions    Catheter Care: PICC - Flush each line with 5mL of 10 unit/mL heparin every 24 hours    Vascular Access Device Protocol Per Policy  Supplies through home infusion  [facility]  Fax: [phone]  Ph: [phone]     Medications:  IV Medications through home infusion: cefepime 2g IV q8 hours   Guille Yanez [facility]   Home Medication Instructions [id number]    Printed on:07/25/19 5913   Medication Information                      acyclovir (ZOVIRAX) 800 MG tablet  Take 1 tablet (800 mg) by mouth 2 times daily             calcium carbonate (CALCIUM CARBONATE) 600 MG tablet  Take 2 tablets by mouth daily              ceFEPIme (MAXIPIME) 2 G vial  Inject 2 g into the vein every 8 hours             Cholecalciferol (VITAMIN D3) 2000 units CAPS  Take 2,000 Units by mouth daily              fluconazole (DIFLUCAN) 100 MG tablet  Take 1 tablet (100 mg) by mouth daily             heparin lock flush 10 UNIT/ML SOLN injection  10 mLs by Intracatheter route once as needed for line flush (for locking each dormant lumen with line placement)             loratadine (CLARITIN) 10 MG tablet  Take 10 mg by mouth daily             PARoxetine (PAXIL) 20 MG tablet  Take 20 mg by mouth every morning                   Nutrition: Regular diet as tolerated    Blood Transfusions:  Transfuse if Hemoglobin < or equal 8 mg/dL  Transfuse if Platelet count < or equal 10,000 uL      Intravenous Electrolyte Replacement:  Potassium  chloride (give only if serum creatinine < 2)     3-3.3  20mEq/hr  over 1 hour x 2 doses     <3      20mEq/hr  over 1 hour x 3 doses  Magnesium sulfate 1.3-1.7      2 grams over 1 hour x1 dose                                     <1.3         2 grams over 2 hours x1 dose    Outpatient Pharmacy:  G-CSF to be given in clinic: (dose) 480mcg prn for anc <1000    Laboratory Tests:  At next clinic appointment (date: 7/26/2019)  Hemogram (CBC) differential, platelet count  Basic Metabolic Panel    Support Services:  None    Appointments:   BMT Clinic (date, time, provider): 7/26/2019    Mony Pitts PA-C  [phone]

## 2019-07-25 NOTE — PROGRESS NOTES
"BMT Daily Progress Note   07/25/2019    Patient ID:   Angel Yanez is a 60 yo man Day +69 s/p 2nd auto for IgM kappa MM .   Diagnosis MM Multiple myeloma  HCT Type Autologous    Prep Regimen Cytoxan  Melphalan   Donor Source Self     No  Primary BMT Provider Dr Lucio     Angel was admitted on 7/21/2019 for fevers    INTERVAL  HISTORY     Guille has persistent dry cough. Sleeping well, no shortness of breath or chest pain. Looking forward to returning home and able to make clinic visits as directly, daily if needed.    Review of Systems: 10 point ROS negative except as noted above.      PHYSICAL EXAM     Weight In/Out     Wt Readings from Last 3 Encounters:   07/25/19 77.6 kg (171 lb)   07/18/19 79.2 kg (174 lb 11.2 oz)   07/15/19 78.9 kg (174 lb)      I/O last 3 completed shifts:  In: 1755 [P.O.:1320; I.V.:435]  Out: 2050 [Urine:2050]       /74 (BP Location: Right arm)   Pulse 78   Temp 97.7  F (36.5  C) (Axillary)   Resp 16   Ht 1.753 m (5' 9\")   Wt 77.6 kg (171 lb)   SpO2 97%   BMI 25.25 kg/m       General: NAD, pleasant  Eyes:  sclera anicteric and not injected  Nose/Mouth/Throat: OP moist, no ulcerations   Lungs: CTA bilaterally, no wheezes appreciated, some crackles bilaterally. Dry sounding cough  Cardiovascular: regular rate and rhythm, no M/R/G   Abdominal/Rectal: +BS, soft, NT, ND   Lymphatics: no edema  Skin: no rash or petechiae; site of right jennings removed 7/23 without blood or discharge on gauze   Neuro: non-focal, no gross deficits  Additional Findings: Right hand peripheral IV    LABS AND IMAGING - PAST 24 HOURS     Results for orders placed or performed during the hospital encounter of 07/21/19 (from the past 24 hour(s))   Basic metabolic panel   Result Value Ref Range    Sodium 140 133 - 144 mmol/L    Potassium 3.7 3.4 - 5.3 mmol/L    Chloride 109 94 - 109 mmol/L    Carbon Dioxide 25 20 - 32 mmol/L    Anion Gap 6 3 - 14 mmol/L    Glucose 84 70 - 99 mg/dL    Urea Nitrogen 12 7 - " 30 mg/dL    Creatinine 0.79 0.66 - 1.25 mg/dL    GFR Estimate >90 >60 mL/min/[1.73_m2]    GFR Estimate If Black >90 >60 mL/min/[1.73_m2]    Calcium 9.0 8.5 - 10.1 mg/dL   CBC with platelets differential   Result Value Ref Range    WBC 8.8 4.0 - 11.0 10e9/L    RBC Count 2.85 (L) 4.4 - 5.9 10e12/L    Hemoglobin 9.2 (L) 13.3 - 17.7 g/dL    Hematocrit 28.2 (L) 40.0 - 53.0 %    MCV 99 78 - 100 fl    MCH 32.3 26.5 - 33.0 pg    MCHC 32.6 31.5 - 36.5 g/dL    RDW 21.9 (H) 10.0 - 15.0 %    Platelet Count 45 (LL) 150 - 450 10e9/L    Diff Method Manual Differential     % Neutrophils 75.8 %    % Lymphocytes 14.7 %    % Monocytes 9.5 %    % Eosinophils 0.0 %    % Basophils 0.0 %    Absolute Neutrophil 6.7 1.6 - 8.3 10e9/L    Absolute Lymphocytes 1.3 0.8 - 5.3 10e9/L    Absolute Monocytes 0.8 0.0 - 1.3 10e9/L    Absolute Eosinophils 0.0 0.0 - 0.7 10e9/L    Absolute Basophils 0.0 0.0 - 0.2 10e9/L    Anisocytosis Moderate     Poikilocytosis Slight     Ovalocytes Slight     Platelet Estimate Confirming automated cell count          OVERALL PLAN   Angel Yanez is a 58 yo man Day +69 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  BMT/MM:   Slow engraftment; cell dose was only 0.639x10^6. ([facility] [place] - known poor cell dose as failed chemo-mobilization 1/2019.)   - last dose GCSF 7/3 and 7/10 and then 7/18, again 7/24 with good response ANC 6.7 today  - BM BX 6/11/2019:  No morphologic or immunophenotypic evidence of recurrent/persistent  plasma cell neoplasm    2.  HEME: Keep Hgb>8g/dL, plt >10.   - Line removal 7/23  - ongoing cytopenias - low cell dose with transplant + viral URI?   - Pt has RBC antibodies. Antibody work up completed 5/26. Hospitalist Confirmed with BB that the patient has no clinically significant antibodies     3.  ID: No fever since 7/21  - Hospitalist ordered CT chest which shows some new tree and bud which could be viral.  Also sinus CT with some frothy debris but no real sinus sx.  Continue cefepime for now.   No ENT consult.  - 7/21 Bacteremia( 2/2 bc+) 7/22 and 7/23 bc are neg. Pseudomonas( positive by verigene) tobra daily through 7/23, jennings removed 7/23. Cont cefepime as sensitivities back 7/24. As peripheral bld cx 7/24 neg x24 hours, place PICC and discharge with home infusion (coverage confirmed) through 8/2  - RVP from 7/21 is positive for rhino virus.  Symptomatic management.   - +parainfluenza 3 (5/30). Ongoing cough, no hemoptysis.  Cough drops, tessalon pearls prn. 7/21 PIV3=neg  - prophy with ACV, Fluconazole, Pentamidine (7/15). Hold azithromycin will on cefepime  - CMV neg 7/15. On CMV vaccine trial. ANC 1.0, study requires >1; discussed with Dr Mukherjee, appropriate to still give as planned     4.  GI:  No complaints.  - GI prophy- omeprazole.       5.  FEN/Renal: Creat, lytes wnl. Eating well.   - Creat=0.79, on 50mL/hr NS fluids; end 7/24.     6.  Mood:  Cont Paxil    Dispo: discharge to home with IV home infusion coverage after PICC placement today.    Mony Pitts PAC  [phone]    The patient was seen and examined by me separately from the midlevel provider. The note reflects our mutual assessment and plans and were approved by me personally.   I personally reviewed today's lab results vital signs and radiology results.    Each point of the assessment and plan were reviewed by the midlevel and me and either endorsed by me or were my added decisions.    My pertinent physical findings today are: Now afebrile, clear lungs and no rash.    My assessment and plan are: 58 yo day 61 post second BMT for myeloma dmitted for fever. Stable and lucent today: await culture results. Cefepime and vanco for now: BC + for Gm- rods: add tobra and remove Jennings. Redose with tobra pending sensitivities. If culture tomorrow is negative, we can insert a PICC and send him home on home antibiotic infusions.Ready for discharge that I spent 40 min preparing today.    Stephan Godwin M.D.

## 2019-07-25 NOTE — DISCHARGE SUMMARY
"[facility] Discharge Summary   Angel Yanez MRN# [id number]   Age: 59 year old  YOB: 1960   Date of Admission: 7/21/2019  Date of Discharge:  7/25/2019  Admitting Physician: Stephan Godwin MD  Discharge Physician:  Stephan Godwin MD  Discharge Diagnoses:    1. Blood cx positive pseudomonal bacteremia  2. Intermittent neutropenia with pancytopenias  3. Febrile bacteremia  4. Positive rhinovirus swab on RVP  5. Internal jugular jennings central line removal 7/23  6. PICC line placement required for home infusion IV antibiotics     Discharge Medications:       Guille Yanez   Home Medication Instructions [id number]    Printed on:07/25/19 0909   Medication Information                      acyclovir (ZOVIRAX) 800 MG tablet  Take 1 tablet (800 mg) by mouth 2 times daily             calcium carbonate (CALCIUM CARBONATE) 600 MG tablet  Take 2 tablets by mouth daily              ceFEPIme (MAXIPIME) 2 G vial  Inject 2 g into the vein every 8 hours             Cholecalciferol (VITAMIN D3) 2000 units CAPS  Take 2,000 Units by mouth daily              fluconazole (DIFLUCAN) 100 MG tablet  Take 1 tablet (100 mg) by mouth daily             heparin lock flush 10 UNIT/ML SOLN injection  10 mLs by Intracatheter route once as needed for line flush (for locking each dormant lumen with line placement)             loratadine (CLARITIN) 10 MG tablet  Take 10 mg by mouth daily             PARoxetine (PAXIL) 20 MG tablet  Take 20 mg by mouth every morning                 Brief History of Illness:    7/21/2019  Angel Yanez is a 58 yo male Day +65 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      Patient has been having persistent cough since he was diagnosed with PIV3 on 5/30. It comes in bouts present throughout the day and is exacerbated by talking and \"sugary drinks\". No overall improvement since it first started in May. It continues to be a predominantly dry cough with no " "significant expectoration. He denies night-time awakenings due to cough. Cough causes SOB, MSK chest pain and generally makes him uncomfortable. He has been taking ricola cough drops, nyquil and mucinex but without relief. No history of asthma or COPD. History of 10 pack-year smoking but quit 30 years ago. He also has had intermittent runny nose. As soon as he goes out of his home, he would have an episode of cough and runny nose. Wife runs a day care in their home and she might have been exposed to sick children. He has also noted intermittent tachycardia on vital checks during routine clinic visits.      Today, he incidentally noted a temp of 100.3 at 5 PM increased to 102.2 on recheck 30 minutes to 1 hour later. Patient and wife called the fellow on call and were advised to come to the ED for evaluation.      Apart from the cough and runny nose as detailed above, patient is generally asymptomatic. No headache or other CNS symptoms. No sinus pains, itchy watery eyes, sore throat, postnasal drip, abdominal pain, nausea, vomiting, diarrhea, urinary symptoms, skin rashes (hx of psoariasis). He had problems with mucositis and mouth ulcers in the immediate post-transplant period but not any more. Right chest Jennings catheter site is unremarkable.      In the ED, he was started on vancomycin and cefepime and given NS bolus x 1L f/b 125 ml/hr.    Physical Exam:    /74 (BP Location: Right arm)   Pulse 78   Temp 97.7  F (36.5  C) (Axillary)   Resp 16   Ht 1.753 m (5' 9\")   Wt 77.6 kg (171 lb)   SpO2 97%   BMI 25.25 kg/m    General: NAD, pleasant  Eyes:  sclera anicteric and not injected  Nose/Mouth/Throat: OP moist, no ulcerations   Lungs: CTA bilaterally, no wheezes appreciated, some crackles bilaterally. Dry sounding cough  Cardiovascular: regular rate and rhythm, no M/R/G   Abdominal/Rectal: +BS, soft, NT, ND   Lymphatics: no edema  Skin: no rash or petechiae; site of right jennings removed 7/23 without " blood or discharge on gauze   Neuro: non-focal, no gross deficits  Additional Findings: Left forearm peripheral IV      Hospital Course:    Angel Yanez is a 58 yo man Day +69 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  BMT/MM:   Slow engraftment; cell dose was only 0.639x10^6. ([facility] [place] - known poor cell dose as failed chemo-mobilization 1/2019.)   - last dose GCSF 7/3, 7/10, 7/18, given yesterday 7/24 with good response ANC 6.7 today  - BM BX 6/11/2019:  No morphologic or immunophenotypic evidence of recurrent/persistent  plasma cell neoplasm     2.  HEME: Keep Hgb>8g/dL, plt >10.   - Line removal 7/23  - ongoing cytopenias - low cell dose with transplant + viral URI?   - Pt has RBC antibodies. Antibody work up completed 5/26. Hospitalist Confirmed with BB that the patient has no clinically significant antibodies     3.  ID: No fever since 7/21  - Hospitalist ordered CT chest which shows some new tree and bud which could be viral.  Also sinus CT with some frothy debris but no real sinus sx.  Continue cefepime for now.  No ENT consult.  - 7/21 Bacteremia( 2/2 bc+) 7/22 and 7/23 bc are neg. Pseudomonas( positive by verigene) tobra daily through 7/23, jennings removed 7/23. Cont cefepime as sensitivities back 7/24. As peripheral bld cx 7/24 neg x24 hours, place PICC and discharge with home infusion (coverage confirmed) through 8/2  - RVP from 7/21 is positive for rhino virus.  Symptomatic management.   - +parainfluenza 3 (5/30). Ongoing cough, no hemoptysis.  Cough drops, tessalon pearls prn. 7/21 PIV3=neg  - prophy with ACV, Fluconazole, Pentamidine (7/15). Hold azithromycin will on cefepime  - CMV neg 7/15. On CMV vaccine trial. ANC 1.0, study requires >1; discussed with Dr Mukherjee, appropriate to still give as planned     4.  GI:  No complaints.  - GI prophy- omeprazole.       5.  FEN/Renal: Creat, lytes wnl. Eating well.      6.  Mood:  Cont Paxil     Dispo: discharge to home with IV home infusion  coverage after PICC placement today.    CODE STATUS: full code  Discharge Instructions and Follow-Up:    Discharge diet: Regular diet as tolerated  Discharge activity: Activity as tolerated   Discharge follow-up: Follow up with BMT Clinic as follows: 7/26/2019 appointment requested.    Discharge Disposition:    Discharged to home.    Mony Pitts [facility]  [phone]

## 2019-07-25 NOTE — PROGRESS NOTES
Home Infusion  Guille is discharging today and will be going home on IV cefepime q 8 hrs.   He was on service with [facility] prior to this admission for [facility] cares.   Guille now has a PICC and needs a teach for his IV cefepime administration method.    Met with Guille at bedside and explained the IVP administration method for cefepime.  Instructed in administration using teaching sheets and practice equipment.  Extension tubing added to gray lumen of PICC which flushed well and had good blood return.  Delivery of med and supplies will be to patient's home later today.  Discussed dosing times, storage of medication and verified pt has [facility] contact phone # for any questions or concerns.  Guille will be going to clinic for PICC dressing changes and any labs needed.    Guilel verbalized understanding of information given.  Able to repeat back administration/teach back with good understanding/good technique and stated he feels comfortable administering his cefepime independently .   No concerns about Guille's ability to manage home IV therapy.    Carmen MONTALVO  [facility] Liaison  [phone]

## 2019-07-25 NOTE — PROGRESS NOTES
Outpatient coordinator notified of pt's discharge.     Line Company:  [facility] [phone] for line care supplies and IV Abx   Referral made for line care:  Yes  IV medications needed at discharge: TID IV Cefepime through 8/2.    Pharmacy concerns:  None  PT/OT recommended:  No  Referral made for PT/OT:  NA  D/c location:  [facility]  Has placement need been communicated to Social Work?  NA  Medicare form required:  No    Provided Guille with when to call sheet and discussed referral to [facility] for IV Cefepime. Provided phone number to [facility]. He stated understanding of this and denied any questions.

## 2019-07-26 ENCOUNTER — HOME INFUSION (PRE-WILLOW HOME INFUSION) (OUTPATIENT)
Dept: PHARMACY | Facility: CLINIC | Age: 59
End: 2019-07-26

## 2019-07-26 ENCOUNTER — ONCOLOGY VISIT (OUTPATIENT)
Dept: TRANSPLANT | Facility: CLINIC | Age: 59
End: 2019-07-26
Attending: PHYSICIAN ASSISTANT
Payer: COMMERCIAL

## 2019-07-26 ENCOUNTER — APPOINTMENT (OUTPATIENT)
Dept: LAB | Facility: CLINIC | Age: 59
End: 2019-07-26
Attending: PHYSICIAN ASSISTANT
Payer: COMMERCIAL

## 2019-07-26 VITALS
BODY MASS INDEX: 25.81 KG/M2 | RESPIRATION RATE: 16 BRPM | SYSTOLIC BLOOD PRESSURE: 128 MMHG | OXYGEN SATURATION: 97 % | HEART RATE: 94 BPM | WEIGHT: 174.8 LBS | TEMPERATURE: 98.4 F | DIASTOLIC BLOOD PRESSURE: 80 MMHG

## 2019-07-26 DIAGNOSIS — C90.01 MULTIPLE MYELOMA IN REMISSION (H): ICD-10-CM

## 2019-07-26 LAB
ANION GAP SERPL CALCULATED.3IONS-SCNC: 5 MMOL/L (ref 3–14)
BACTERIA SPEC CULT: ABNORMAL
BASOPHILS # BLD AUTO: 0 10E9/L (ref 0–0.2)
BASOPHILS NFR BLD AUTO: 0.2 %
BLD PROD TYP BPU: NORMAL
BLD PROD TYP BPU: NORMAL
BLD UNIT ID BPU: 0
BLD UNIT ID BPU: 0
BLOOD PRODUCT CODE: NORMAL
BLOOD PRODUCT CODE: NORMAL
BPU ID: NORMAL
BPU ID: NORMAL
BUN SERPL-MCNC: 16 MG/DL (ref 7–30)
CALCIUM SERPL-MCNC: 8.9 MG/DL (ref 8.5–10.1)
CHLORIDE SERPL-SCNC: 107 MMOL/L (ref 94–109)
CO2 SERPL-SCNC: 26 MMOL/L (ref 20–32)
CREAT SERPL-MCNC: 0.92 MG/DL (ref 0.66–1.25)
DIFFERENTIAL METHOD BLD: ABNORMAL
EOSINOPHIL # BLD AUTO: 0.1 10E9/L (ref 0–0.7)
EOSINOPHIL NFR BLD AUTO: 1.2 %
ERYTHROCYTE [DISTWIDTH] IN BLOOD BY AUTOMATED COUNT: 22.1 % (ref 10–15)
GFR SERPL CREATININE-BSD FRML MDRD: 89 ML/MIN/{1.73_M2}
GLUCOSE SERPL-MCNC: 105 MG/DL (ref 70–99)
HCT VFR BLD AUTO: 28.8 % (ref 40–53)
HGB BLD-MCNC: 9.7 G/DL (ref 13.3–17.7)
IMM GRANULOCYTES # BLD: 0.2 10E9/L (ref 0–0.4)
IMM GRANULOCYTES NFR BLD: 1.7 %
LYMPHOCYTES # BLD AUTO: 2 10E9/L (ref 0.8–5.3)
LYMPHOCYTES NFR BLD AUTO: 19.6 %
Lab: ABNORMAL
MCH RBC QN AUTO: 33.7 PG (ref 26.5–33)
MCHC RBC AUTO-ENTMCNC: 33.7 G/DL (ref 31.5–36.5)
MCV RBC AUTO: 100 FL (ref 78–100)
MONOCYTES # BLD AUTO: 0.6 10E9/L (ref 0–1.3)
MONOCYTES NFR BLD AUTO: 6.1 %
NEUTROPHILS # BLD AUTO: 7.2 10E9/L (ref 1.6–8.3)
NEUTROPHILS NFR BLD AUTO: 71.2 %
NRBC # BLD AUTO: 0 10*3/UL
NRBC BLD AUTO-RTO: 0 /100
PLATELET # BLD AUTO: 39 10E9/L (ref 150–450)
PLATELET # BLD EST: ABNORMAL 10*3/UL
POTASSIUM SERPL-SCNC: 3.6 MMOL/L (ref 3.4–5.3)
RBC # BLD AUTO: 2.88 10E12/L (ref 4.4–5.9)
SODIUM SERPL-SCNC: 139 MMOL/L (ref 133–144)
SPECIMEN SOURCE: ABNORMAL
TRANSFUSION STATUS PATIENT QL: NORMAL
WBC # BLD AUTO: 10.1 10E9/L (ref 4–11)

## 2019-07-26 PROCEDURE — G0463 HOSPITAL OUTPT CLINIC VISIT: HCPCS

## 2019-07-26 PROCEDURE — 80048 BASIC METABOLIC PNL TOTAL CA: CPT | Performed by: STUDENT IN AN ORGANIZED HEALTH CARE EDUCATION/TRAINING PROGRAM

## 2019-07-26 PROCEDURE — 85025 COMPLETE CBC W/AUTO DIFF WBC: CPT | Performed by: STUDENT IN AN ORGANIZED HEALTH CARE EDUCATION/TRAINING PROGRAM

## 2019-07-26 PROCEDURE — 36592 COLLECT BLOOD FROM PICC: CPT

## 2019-07-26 ASSESSMENT — PAIN SCALES - GENERAL: PAINLEVEL: NO PAIN (0)

## 2019-07-26 NOTE — PROGRESS NOTES
BMT Clinic Progress Note   07/26/2019     Patient ID: Angel Yanez is a 60 yo man Day +70 s/p 2nd auto for IgM kappa MM .   Diagnosis MM Multiple myeloma  HCT Type Autologous    Prep Regimen Cytoxan  Melphalan   Donor Source Self     No  Primary BMT Provider Dr Lucio      Angel was admitted on 7/21/2019 for fevers, pseudomonal bacteremia tx with antibiotics, subsequent line removal; discharged 7/25/2019.     INTERVAL  HISTORY   Guille presents to clinic after hospital discharge yesterday. Thinks his cough might be a little better. No fevers or chilling. Administrating his cefepime without issue at home, three times/day.      Review of Systems: 10 point ROS negative except as noted above.     Physical Exam  /80 (BP Location: Right arm, Patient Position: Sitting, Cuff Size: Adult Regular)   Pulse 94   Temp 98.4  F (36.9  C) (Oral)   Resp 16   Wt 79.3 kg (174 lb 12.8 oz)   SpO2 97%   BMI 25.81 kg/m    Wt Readings from Last 4 Encounters:   07/26/19 79.3 kg (174 lb 12.8 oz)   07/25/19 77.6 kg (171 lb)   07/18/19 79.2 kg (174 lb 11.2 oz)   07/15/19 78.9 kg (174 lb)     General: NAD, pleasant  Eyes:  sclera anicteric and not injected  Nose/Mouth/Throat: OP moist, no ulcerations   Lungs: CTA bilaterally, no wheezes appreciated, NO crackles 7/26. Dry sounding cough  Cardiovascular: regular rate and rhythm, no M/R/G   Abdominal/Rectal: +BS, soft, NT, ND   Lymphatics: no edema  Skin: no rash or petechiae; site of right jennings removed 7/23 without blood or discharge on gauze   Neuro: non-focal, no gross deficits  Additional Findings: Right hand peripheral IV    Labs:  Lab Results   Component Value Date    WBC 10.1 07/26/2019     Lab Results   Component Value Date    RBC 2.88 07/26/2019     Lab Results   Component Value Date    HGB 9.7 07/26/2019     Lab Results   Component Value Date    HCT 28.8 07/26/2019     Lab Results   Component Value Date     07/26/2019     Lab Results   Component Value Date     [facility] 33.7 07/26/2019     Lab Results   Component Value Date    MCHC 33.7 07/26/2019     Lab Results   Component Value Date    RDW 22.1 07/26/2019     Lab Results   Component Value Date    PLT 39 07/26/2019         Assessment and Plan  Angel Yanez is a 58 yo man Day +70 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  BMT/MM:   Slow engraftment; cell dose was only 0.639x10^6. ([facility] [place] - known poor cell dose as failed chemo-mobilization 1/2019.)   - last dose GCSF 7/3 and 7/10 and then 7/18, again 7/24 with good response ANC 6.7. WBC up again today 7/26 and hgb improving slowly.   - BM BX 6/11/2019:  No morphologic or immunophenotypic evidence of recurrent/persistent  plasma cell neoplasm     2.  HEME: Keep Hgb>8g/dL, plt >10.   - Line removal 7/23  - ongoing cytopenias - low cell dose with transplant + viral URI?   - Pt has RBC antibodies. Antibody work up completed 5/26. Hospitalist Confirmed with BB that the patient has no clinically significant antibodies     3.  ID: No fever since 7/21  WBC up today, blood cx x48 hours NGTD. Last GCSF 7/24, might be starting to come up  - Hospitalist ordered CT chest which shows some new tree and bud which could be viral.  Also sinus CT with some frothy debris but no real sinus sx.  Continue cefepime for now.  No ENT consult.  - 7/21 Bacteremia( 2/2 bc+) 7/22 and 7/23 bc are neg. Pseudomonas( positive by verigene) tobra daily through 7/23, jennings removed 7/23. Cont cefepime as sensitivities back 7/24. As peripheral bld cx 7/24 neg x24 hours, 7/25 PICC placed, set up with home infusion (coverage confirmed) through 8/2  - RVP from 7/21 is positive for rhino virus.  Symptomatic management. IgG level added on 7/26  - +parainfluenza 3 (5/30). Ongoing cough, no hemoptysis.  Cough drops, tessalon pearls prn. 7/21 PIV3=neg  - prophy with ACV, Fluconazole, Pentamidine (7/15). Hold azithromycin will on cefepime  - CMV neg 7/15. On CMV vaccine trial. ANC 1.0, study requires >1;  discussed with Dr Mukherjee, appropriate to still give as planned     4.  GI:  No complaints.  - GI prophy- omeprazole.       5.  FEN/Renal: Creat, lytes wnl. Eating well.      6.  Mood:  Cont Paxil      Plan/RTC  Tuesday for follow up, possible plt  Friday for PICC line removal     Mony Pitts PAC  [phone]

## 2019-07-26 NOTE — NURSING NOTE
"Oncology Rooming Note    July 26, 2019 2:02 PM   Angel Yanez is a 59 year old male who presents for:    Chief Complaint   Patient presents with     Blood Draw     Labs drawn via PICC by RN in lab. VS taken. Pt checked in for next appt     RECHECK     Pt is here for a rtn for S/P BMT MM     Initial Vitals: Blood Pressure 128/80 (BP Location: Right arm, Patient Position: Sitting, Cuff Size: Adult Regular)   Pulse 94   Temperature 98.4  F (36.9  C) (Oral)   Respiration 16   Weight 79.3 kg (174 lb 12.8 oz)   Oxygen Saturation 97%   Body Mass Index 25.81 kg/m   Estimated body mass index is 25.81 kg/m  as calculated from the following:    Height as of 7/22/19: 1.753 m (5' 9\").    Weight as of this encounter: 79.3 kg (174 lb 12.8 oz). Body surface area is 1.96 meters squared.  No Pain (0) Comment: Data Unavailable   No LMP for male patient.  Allergies reviewed: Yes  Medications reviewed: Yes    Medications: Medication refills not needed today.  Pharmacy name entered into EPIC:    [facility] [address]  [facility] - [place], [place] - [address]    Clinical concerns: none       Shoshana Salazar MA              "

## 2019-07-26 NOTE — PROGRESS NOTES
This is a recent snapshot of the patient's [facility] medical record.  For current drug dose and complete information and questions, call [phone] or In Basket pool, [facility] home infusion ([id number])  CSN Number:  [id number]

## 2019-07-26 NOTE — NURSING NOTE
Chief Complaint   Patient presents with     Blood Draw     Labs drawn via PICC by RN in lab. VS taken. Pt checked in for next appt     Labs collected from PICC.  Line flushed with saline.  Vitals taken and pt checked in for appt.    Leslie MAXWELL RN PHN BSN  BMT/Oncology Lab

## 2019-07-27 ENCOUNTER — APPOINTMENT (OUTPATIENT)
Dept: LAB | Facility: CLINIC | Age: 59
End: 2019-07-27
Attending: PHYSICIAN ASSISTANT
Payer: COMMERCIAL

## 2019-07-28 LAB
BACTERIA SPEC CULT: NO GROWTH
Lab: NORMAL
SPECIMEN SOURCE: NORMAL

## 2019-07-29 LAB
BLD PROD TYP BPU: NORMAL
NUM BPU REQUESTED: 1

## 2019-07-29 NOTE — PROGRESS NOTES
This is a recent snapshot of the patient's [facility] medical record.  For current drug dose and complete information and questions, call [phone] or In Basket pool, fv home infusion ([id number])  CSN Number:  [id number]

## 2019-07-30 ENCOUNTER — ONCOLOGY VISIT (OUTPATIENT)
Dept: TRANSPLANT | Facility: CLINIC | Age: 59
End: 2019-07-30
Attending: STUDENT IN AN ORGANIZED HEALTH CARE EDUCATION/TRAINING PROGRAM
Payer: COMMERCIAL

## 2019-07-30 ENCOUNTER — APPOINTMENT (OUTPATIENT)
Dept: LAB | Facility: CLINIC | Age: 59
End: 2019-07-30
Attending: STUDENT IN AN ORGANIZED HEALTH CARE EDUCATION/TRAINING PROGRAM
Payer: COMMERCIAL

## 2019-07-30 VITALS
DIASTOLIC BLOOD PRESSURE: 77 MMHG | HEIGHT: 69 IN | TEMPERATURE: 97.4 F | OXYGEN SATURATION: 100 % | WEIGHT: 173.9 LBS | BODY MASS INDEX: 25.76 KG/M2 | HEART RATE: 88 BPM | SYSTOLIC BLOOD PRESSURE: 109 MMHG | RESPIRATION RATE: 18 BRPM

## 2019-07-30 DIAGNOSIS — C90.01 MULTIPLE MYELOMA IN REMISSION (H): Primary | ICD-10-CM

## 2019-07-30 LAB
ALBUMIN SERPL-MCNC: 3.7 G/DL (ref 3.4–5)
ALP SERPL-CCNC: 76 U/L (ref 40–150)
ALT SERPL W P-5'-P-CCNC: 20 U/L (ref 0–70)
ANION GAP SERPL CALCULATED.3IONS-SCNC: 4 MMOL/L (ref 3–14)
AST SERPL W P-5'-P-CCNC: 23 U/L (ref 0–45)
BACTERIA SPEC CULT: NO GROWTH
BASOPHILS # BLD AUTO: 0 10E9/L (ref 0–0.2)
BASOPHILS NFR BLD AUTO: 0.8 %
BILIRUB SERPL-MCNC: 0.5 MG/DL (ref 0.2–1.3)
BLD PROD TYP BPU: NORMAL
BLD UNIT ID BPU: 0
BLOOD PRODUCT CODE: NORMAL
BPU ID: NORMAL
BUN SERPL-MCNC: 17 MG/DL (ref 7–30)
CALCIUM SERPL-MCNC: 9.2 MG/DL (ref 8.5–10.1)
CHLORIDE SERPL-SCNC: 108 MMOL/L (ref 94–109)
CO2 SERPL-SCNC: 27 MMOL/L (ref 20–32)
CREAT SERPL-MCNC: 0.79 MG/DL (ref 0.66–1.25)
DIFFERENTIAL METHOD BLD: ABNORMAL
EOSINOPHIL # BLD AUTO: 0.1 10E9/L (ref 0–0.7)
EOSINOPHIL NFR BLD AUTO: 3.8 %
ERYTHROCYTE [DISTWIDTH] IN BLOOD BY AUTOMATED COUNT: 21.7 % (ref 10–15)
GFR SERPL CREATININE-BSD FRML MDRD: >90 ML/MIN/{1.73_M2}
GLUCOSE SERPL-MCNC: 106 MG/DL (ref 70–99)
HCT VFR BLD AUTO: 29.8 % (ref 40–53)
HGB BLD-MCNC: 9.8 G/DL (ref 13.3–17.7)
IMM GRANULOCYTES # BLD: 0 10E9/L (ref 0–0.4)
IMM GRANULOCYTES NFR BLD: 0 %
LYMPHOCYTES # BLD AUTO: 1.1 10E9/L (ref 0.8–5.3)
LYMPHOCYTES NFR BLD AUTO: 39.5 %
Lab: NORMAL
MCH RBC QN AUTO: 33.7 PG (ref 26.5–33)
MCHC RBC AUTO-ENTMCNC: 32.9 G/DL (ref 31.5–36.5)
MCV RBC AUTO: 102 FL (ref 78–100)
MONOCYTES # BLD AUTO: 0.5 10E9/L (ref 0–1.3)
MONOCYTES NFR BLD AUTO: 18 %
NEUTROPHILS # BLD AUTO: 1 10E9/L (ref 1.6–8.3)
NEUTROPHILS NFR BLD AUTO: 37.9 %
NRBC # BLD AUTO: 0 10*3/UL
NRBC BLD AUTO-RTO: 0 /100
PLATELET # BLD AUTO: 26 10E9/L (ref 150–450)
POTASSIUM SERPL-SCNC: 4.5 MMOL/L (ref 3.4–5.3)
PROT SERPL-MCNC: 7.1 G/DL (ref 6.8–8.8)
RBC # BLD AUTO: 2.91 10E12/L (ref 4.4–5.9)
SODIUM SERPL-SCNC: 139 MMOL/L (ref 133–144)
SPECIMEN SOURCE: NORMAL
TRANSFUSION STATUS PATIENT QL: NORMAL
TRANSFUSION STATUS PATIENT QL: NORMAL
WBC # BLD AUTO: 2.7 10E9/L (ref 4–11)

## 2019-07-30 PROCEDURE — 80053 COMPREHEN METABOLIC PANEL: CPT | Performed by: STUDENT IN AN ORGANIZED HEALTH CARE EDUCATION/TRAINING PROGRAM

## 2019-07-30 PROCEDURE — 40000268 ZZH STATISTIC NO CHARGES: Mod: ZF

## 2019-07-30 PROCEDURE — 96372 THER/PROPH/DIAG INJ SC/IM: CPT

## 2019-07-30 PROCEDURE — 85025 COMPLETE CBC W/AUTO DIFF WBC: CPT | Performed by: STUDENT IN AN ORGANIZED HEALTH CARE EDUCATION/TRAINING PROGRAM

## 2019-07-30 PROCEDURE — 25000128 H RX IP 250 OP 636: Mod: ZF | Performed by: PHYSICIAN ASSISTANT

## 2019-07-30 PROCEDURE — G0463 HOSPITAL OUTPT CLINIC VISIT: HCPCS | Mod: ZF

## 2019-07-30 RX ORDER — ALBUTEROL SULFATE 5 MG/ML
2.5 SOLUTION RESPIRATORY (INHALATION)
Status: CANCELLED
Start: 2019-08-15

## 2019-07-30 RX ORDER — PENTAMIDINE ISETHIONATE 300 MG/300MG
300 INHALANT RESPIRATORY (INHALATION)
Status: CANCELLED
Start: 2019-08-15

## 2019-07-30 RX ORDER — HEPARIN SODIUM,PORCINE 10 UNIT/ML
5 VIAL (ML) INTRAVENOUS
Status: CANCELLED | OUTPATIENT
Start: 2019-08-15

## 2019-07-30 RX ADMIN — FILGRASTIM 480 MCG: 480 INJECTION, SOLUTION INTRAVENOUS; SUBCUTANEOUS at 08:52

## 2019-07-30 ASSESSMENT — PAIN SCALES - GENERAL: PAINLEVEL: NO PAIN (0)

## 2019-07-30 ASSESSMENT — MIFFLIN-ST. JEOR: SCORE: 1584.19

## 2019-07-30 NOTE — NURSING NOTE
"Oncology Rooming Note    July 30, 2019 8:29 AM   Angel Yanez is a 59 year old male who presents for:    Chief Complaint   Patient presents with     Blood Draw     Left Arm PICC line accessed labs from purple line, flushed with saline, caps changed on both line, vitals completed, checked into next appointment.     RECHECK     Return: Multiple myeloma     Initial Vitals: /77 (BP Location: Right arm, Patient Position: Sitting, Cuff Size: Adult Regular)   Pulse 88   Temp 97.4  F (36.3  C) (Oral)   Resp 18   Ht 1.753 m (5' 9\")   Wt 78.9 kg (173 lb 14.4 oz)   SpO2 100%   BMI 25.68 kg/m   Estimated body mass index is 25.68 kg/m  as calculated from the following:    Height as of this encounter: 1.753 m (5' 9\").    Weight as of this encounter: 78.9 kg (173 lb 14.4 oz). Body surface area is 1.96 meters squared.  No Pain (0) Comment: Data Unavailable   No LMP for male patient.  Allergies reviewed: Yes  Medications reviewed: Yes    Medications: Medication refills not needed today.  Pharmacy name entered into EPIC:    "[facility] - [place] - [address]  [facility] - [place], [place] - [address]    Clinical concerns: N/A      Yesica Duncan CMA              "

## 2019-07-30 NOTE — NURSING NOTE
Neupogen 480 MCG. Sub-Q injection right arm . See MAR. Pt. Tolerated well.    Kathy VALLEJOA

## 2019-07-30 NOTE — PROGRESS NOTES
BMT Clinic Progress Note   07/30/2019     Patient ID: Angel Yanez is a 60 yo man Day +74 s/p 2nd auto for IgM kappa MM .   Diagnosis MM Multiple myeloma  HCT Type Autologous    Prep Regimen Cytoxan  Melphalan   Donor Source Self     No  Primary BMT Provider Dr Lucio      Angel was admitted on 7/21/2019 for fevers, pseudomonal bacteremia tx with antibiotics, subsequent line removal; discharged 7/25/2019.     INTERVAL  HISTORY   Guille returns for follow up and possible plts. He continues to feel well. Cough progressively improving. No fevers. No SOB. No GI sx on cefepime. Guille is understandably anxious about removing PICC line once antibiotic course completed; agrees to slower IV transfusions as he has had repeated line infections.      Review of Systems: 10 point ROS negative except as noted above.     Physical Exam  /77 (BP Location: Right arm, Patient Position: Sitting, Cuff Size: Adult Regular)   Pulse 88   Temp 97.4  F (36.3  C) (Oral)   Resp 18   Wt 78.9 kg (173 lb 14.4 oz)   SpO2 100%   BMI 25.68 kg/m    Wt Readings from Last 4 Encounters:   07/30/19 78.9 kg (173 lb 14.4 oz)   07/26/19 79.3 kg (174 lb 12.8 oz)   07/25/19 77.6 kg (171 lb)   07/18/19 79.2 kg (174 lb 11.2 oz)     General: NAD, pleasant  Eyes:  sclera anicteric and not injected  Nose/Mouth/Throat: OP moist, no ulcerations   Lungs: CTA bilaterally, no crackles or wheezes appreciated 7/30. Dry sounding cough  Cardiovascular: regular rate and rhythm, no M/R/G   Abdominal/Rectal: +BS, soft, NT, ND   Lymphatics: no edema  Skin: no rash or petechiae   Neuro: non-focal, no gross deficits  Additional Findings: Left arm PICC    Labs:  Lab Results   Component Value Date    WBC 2.7 07/30/2019     Lab Results   Component Value Date    RBC 2.91 07/30/2019     Lab Results   Component Value Date    HGB 9.8 07/30/2019     Lab Results   Component Value Date    HCT 29.8 07/30/2019     No components found for: MCT  Lab Results   Component Value  Date     07/30/2019     Lab Results   Component Value Date    MCH 33.7 07/30/2019     Lab Results   Component Value Date    MCHC 32.9 07/30/2019     Lab Results   Component Value Date    RDW 21.7 07/30/2019     Lab Results   Component Value Date    PLT 26 07/30/2019           Assessment and Plan  Angel Yanez is a 58 yo man Day +74 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  BMT/MM:   Slow engraftment; cell dose was only 0.639x10^6. ([facility] [place] - known poor cell dose as failed chemo-mobilization 1/2019.)   - last dose GCSF 7/3 and 7/10 and then 7/18, again 7/24 with good response ANC 6.7. WBC down again today at 1.0 gcsf given 7/30  - BM BX 6/11/2019:  No morphologic or immunophenotypic evidence of recurrent/persistent  plasma cell neoplasm     2.  HEME: Keep Hgb>8g/dL, plt >10.   - Jennings removal 7/23; left arm PICC 7/25  - ongoing cytopenias - low cell dose with transplant + viral URI/bacteremia?   - Pt has RBC antibodies. Antibody work up completed 5/26. Hospitalist Confirmed with BB that the patient has no clinically significant antibodies     3.  ID: No fever since 7/21  - Hospitalist ordered CT chest which shows some new tree and bud which could be viral.  Also sinus CT with some frothy debris but no real sinus sx.  Continue cefepime for now.  No ENT consult.  - 7/21 Bacteremia( 2/2 bc+) 7/22 and 7/23 bc are neg. Pseudomonas( positive by verigene) tobra daily through 7/23, jennings removed 7/23. Cont cefepime as sensitivities back 7/24. As peripheral bld cx 7/24 neg x24 hours, home infusion (coverage confirmed) through 8/2  - RVP from 7/21 is positive for rhino virus.  Symptomatic management. IgG level added on 7/26  - +parainfluenza 3 (5/30). Ongoing cough, no hemoptysis.  Cough drops, tessalon pearls prn. 7/21 PIV3=neg  - prophy with ACV, Fluconazole, Pentamidine (7/15). Hold azithromycin while on cefepime  - CMV neg 7/15. On CMV vaccine trial.     4. GI:  No complaints.  - GI prophy-  omeprazole.       5.  FEN/Renal: Creat, lytes wnl. Eating well.      6.  Mood:  Cont Paxil      Plan/RTC  Friday for provider visit, labs and line removal. Plt ordered  Friday will need reminder to return to [facility] if still borderline neutropenic     Mony Pitts PAC  [phone]

## 2019-07-30 NOTE — NURSING NOTE
Chief Complaint   Patient presents with     Blood Draw     Left Arm PICC line accessed labs from purple line, flushed with saline, caps changed on both line, vitals completed, checked into next appointment.   Tana Mendez,RN

## 2019-07-31 LAB
BACTERIA SPEC CULT: NO GROWTH
BACTERIA SPEC CULT: NO GROWTH
Lab: NORMAL
Lab: NORMAL
SPECIMEN SOURCE: NORMAL
SPECIMEN SOURCE: NORMAL

## 2019-08-02 ENCOUNTER — INFUSION THERAPY VISIT (OUTPATIENT)
Dept: TRANSPLANT | Facility: CLINIC | Age: 59
End: 2019-08-02
Attending: PHYSICIAN ASSISTANT
Payer: COMMERCIAL

## 2019-08-02 ENCOUNTER — APPOINTMENT (OUTPATIENT)
Dept: LAB | Facility: CLINIC | Age: 59
End: 2019-08-02
Attending: PHYSICIAN ASSISTANT
Payer: COMMERCIAL

## 2019-08-02 ENCOUNTER — HOME INFUSION (PRE-WILLOW HOME INFUSION) (OUTPATIENT)
Dept: PHARMACY | Facility: CLINIC | Age: 59
End: 2019-08-02

## 2019-08-02 VITALS
DIASTOLIC BLOOD PRESSURE: 85 MMHG | SYSTOLIC BLOOD PRESSURE: 133 MMHG | TEMPERATURE: 98.1 F | HEART RATE: 101 BPM | OXYGEN SATURATION: 99 % | BODY MASS INDEX: 25.71 KG/M2 | WEIGHT: 174.1 LBS

## 2019-08-02 VITALS
DIASTOLIC BLOOD PRESSURE: 75 MMHG | HEART RATE: 74 BPM | TEMPERATURE: 97.9 F | RESPIRATION RATE: 16 BRPM | SYSTOLIC BLOOD PRESSURE: 114 MMHG | OXYGEN SATURATION: 100 %

## 2019-08-02 DIAGNOSIS — C90.01 MULTIPLE MYELOMA IN REMISSION (H): ICD-10-CM

## 2019-08-02 DIAGNOSIS — C90.01 MULTIPLE MYELOMA IN REMISSION (H): Primary | ICD-10-CM

## 2019-08-02 LAB
ALBUMIN SERPL-MCNC: 3.7 G/DL (ref 3.4–5)
ALP SERPL-CCNC: 78 U/L (ref 40–150)
ALT SERPL W P-5'-P-CCNC: 18 U/L (ref 0–70)
ANION GAP SERPL CALCULATED.3IONS-SCNC: 4 MMOL/L (ref 3–14)
AST SERPL W P-5'-P-CCNC: 22 U/L (ref 0–45)
BASOPHILS # BLD AUTO: 0 10E9/L (ref 0–0.2)
BASOPHILS NFR BLD AUTO: 0.4 %
BILIRUB SERPL-MCNC: 0.4 MG/DL (ref 0.2–1.3)
BLD PROD TYP BPU: NORMAL
BLD UNIT ID BPU: 0
BLOOD PRODUCT CODE: NORMAL
BPU ID: NORMAL
BUN SERPL-MCNC: 18 MG/DL (ref 7–30)
CALCIUM SERPL-MCNC: 8.8 MG/DL (ref 8.5–10.1)
CHLORIDE SERPL-SCNC: 108 MMOL/L (ref 94–109)
CO2 SERPL-SCNC: 27 MMOL/L (ref 20–32)
CREAT SERPL-MCNC: 0.83 MG/DL (ref 0.66–1.25)
DIFFERENTIAL METHOD BLD: ABNORMAL
EOSINOPHIL # BLD AUTO: 0.1 10E9/L (ref 0–0.7)
EOSINOPHIL NFR BLD AUTO: 2.3 %
ERYTHROCYTE [DISTWIDTH] IN BLOOD BY AUTOMATED COUNT: 22.5 % (ref 10–15)
GFR SERPL CREATININE-BSD FRML MDRD: >90 ML/MIN/{1.73_M2}
GLUCOSE SERPL-MCNC: 116 MG/DL (ref 70–99)
HCT VFR BLD AUTO: 28.6 % (ref 40–53)
HGB BLD-MCNC: 9.4 G/DL (ref 13.3–17.7)
IMM GRANULOCYTES # BLD: 0.1 10E9/L (ref 0–0.4)
IMM GRANULOCYTES NFR BLD: 1 %
LYMPHOCYTES # BLD AUTO: 1.2 10E9/L (ref 0.8–5.3)
LYMPHOCYTES NFR BLD AUTO: 23.8 %
MCH RBC QN AUTO: 33.8 PG (ref 26.5–33)
MCHC RBC AUTO-ENTMCNC: 32.9 G/DL (ref 31.5–36.5)
MCV RBC AUTO: 103 FL (ref 78–100)
MONOCYTES # BLD AUTO: 0.4 10E9/L (ref 0–1.3)
MONOCYTES NFR BLD AUTO: 7.6 %
NEUTROPHILS # BLD AUTO: 3.4 10E9/L (ref 1.6–8.3)
NEUTROPHILS NFR BLD AUTO: 64.9 %
NRBC # BLD AUTO: 0 10*3/UL
NRBC BLD AUTO-RTO: 0 /100
PLATELET # BLD AUTO: 20 10E9/L (ref 150–450)
POTASSIUM SERPL-SCNC: 4.4 MMOL/L (ref 3.4–5.3)
PROT SERPL-MCNC: 7.2 G/DL (ref 6.8–8.8)
RBC # BLD AUTO: 2.78 10E12/L (ref 4.4–5.9)
SODIUM SERPL-SCNC: 139 MMOL/L (ref 133–144)
TRANSFUSION STATUS PATIENT QL: NORMAL
TRANSFUSION STATUS PATIENT QL: NORMAL
WBC # BLD AUTO: 5.2 10E9/L (ref 4–11)

## 2019-08-02 PROCEDURE — 36430 TRANSFUSION BLD/BLD COMPNT: CPT

## 2019-08-02 PROCEDURE — 80053 COMPREHEN METABOLIC PANEL: CPT | Performed by: STUDENT IN AN ORGANIZED HEALTH CARE EDUCATION/TRAINING PROGRAM

## 2019-08-02 PROCEDURE — 85025 COMPLETE CBC W/AUTO DIFF WBC: CPT | Performed by: STUDENT IN AN ORGANIZED HEALTH CARE EDUCATION/TRAINING PROGRAM

## 2019-08-02 PROCEDURE — P9037 PLATE PHERES LEUKOREDU IRRAD: HCPCS | Performed by: NURSE PRACTITIONER

## 2019-08-02 PROCEDURE — G0463 HOSPITAL OUTPT CLINIC VISIT: HCPCS | Mod: 25

## 2019-08-02 ASSESSMENT — PAIN SCALES - GENERAL: PAINLEVEL: NO PAIN (0)

## 2019-08-02 NOTE — PROGRESS NOTES
BMT Clinic Progress Note   8/2/2019     Patient ID: Angel Yanez is a 58 yo man Day +77 s/p 2nd auto for IgM kappa MM .   Diagnosis MM Multiple myeloma  HCT Type Autologous    Prep Regimen Cytoxan  Melphalan   Donor Source Self     No  Primary BMT Provider Dr Lucio      Angel was admitted on 7/21/2019 for fevers, pseudomonal bacteremia tx with antibiotics, subsequent line removal; discharged 7/25/2019.     INTERVAL  HISTORY   Guille returns for follow up. Received growth factor on Tuesday and had a nice response. Is completing his IV antibiotics today for his previous line infection and is excited about getting his PICC out today. No fevers, chills, or infectious symptoms. He continues to have a dry cough that is slowly improving. No n/v/d. Energy level is great.      Review of Systems: 10 point ROS negative except as noted above.     Physical Exam  /85 (BP Location: Left arm, Patient Position: Chair, Cuff Size: Adult Regular)   Pulse 101   Temp 98.1  F (36.7  C) (Oral)   Wt 79 kg (174 lb 1.6 oz)   SpO2 99%   BMI 25.71 kg/m       Wt Readings from Last 4 Encounters:   08/02/19 79 kg (174 lb 1.6 oz)   07/30/19 78.9 kg (173 lb 14.4 oz)   07/26/19 79.3 kg (174 lb 12.8 oz)   07/25/19 77.6 kg (171 lb)     General: NAD, pleasant  Eyes:  sclera anicteric and not injected  Nose/Mouth/Throat: OP moist, no ulcerations   Lungs: CTA bilaterally, no crackles or wheezes appreciated 7/30. Dry sounding cough  Cardiovascular: regular rate and rhythm, no M/R/G   Abdominal/Rectal: +BS, soft, NT, ND   Lymphatics: no edema  Skin: no rash or petechiae   Neuro: non-focal, no gross deficits  Additional Findings: Left arm PICC    Labs:  Lab Results   Component Value Date    WBC 2.7 07/30/2019     Lab Results   Component Value Date    RBC 2.91 07/30/2019     Lab Results   Component Value Date    HGB 9.8 07/30/2019     Lab Results   Component Value Date    HCT 29.8 07/30/2019     No components found for: MCT  Lab Results    Component Value Date     07/30/2019     Lab Results   Component Value Date    MCH 33.7 07/30/2019     Lab Results   Component Value Date    MCHC 32.9 07/30/2019     Lab Results   Component Value Date    RDW 21.7 07/30/2019     Lab Results   Component Value Date    PLT 26 07/30/2019           Assessment and Plan  Angel Yanez is a 60 yo man Day +77 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  BMT/MM:   Slow engraftment; cell dose was only 0.639x10^6. ([facility] [place] - known poor cell dose as failed chemo-mobilization 1/2019.)   - requiring gcsf ~ 1x/wk.  Last given 7/30 with great response.   - BM BX 6/11/2019:  No morphologic or immunophenotypic evidence of recurrent/persistent  plasma cell neoplasm     2.  HEME: Keep Hgb>8g/dL, plt >10.   - Jennings removal 7/23; left arm PICC 7/25- Give plts today followed by PICC removal as pt has had repeated line infections and has completed antibiotics.   - ongoing cytopenias - low cell dose with transplant + viral URI/bacteremia?   - Pt has RBC antibodies. Antibody work up completed 5/26. Hospitalist Confirmed with BB that the patient has no clinically significant antibodies     3.  ID: No fever since 7/21  - 7/21 Bacteremia ( 2/2 bc+) 7/22 and 7/23 bc are neg. Pseudomonas( positive by verigene) tobra daily through 7/23, jennings removed 7/23. Cont cefepime as sensitivities back 7/24. As peripheral bld cx 7/24 neg x24 hours, completed 10 days IV cefepime at home, last dose today 8/2.   - RVP from 7/21 is positive for rhino virus.  Symptomatic management. IgG level added on 7/26  - +parainfluenza 3 (5/30). Ongoing cough, no hemoptysis.  Cough drops, tessalon pearls prn. 7/21 PIV3=neg  - prophy with ACV, Fluconazole, Pentamidine (7/15). Pt to resume azithromycin as still requiring gcsf occasionally. If counts stable 8/6, okay to stop.   - CMV neg 7/15. On CMV vaccine trial.     4. GI:  No complaints.  - GI prophy- omeprazole.       5.  FEN/Renal: Creat, lytes wnl.  Eating well.      6.  Mood:  Cont Paxil      Plan/RTC: plts and line removal today. RTC 8/6 for labs and provider visit.       Fei Meng PA-C  [phone]

## 2019-08-02 NOTE — NURSING NOTE
"Oncology Rooming Note    August 2, 2019 8:33 AM   Angel Yanez is a 59 year old male who presents for:    Chief Complaint   Patient presents with     Blood Draw     labs drawn via cvc by RN     Oncology Clinic Visit     Patient with Multiple Myeloma here for provider visit and lab review      Initial Vitals: /85 (BP Location: Left arm, Patient Position: Chair, Cuff Size: Adult Regular)   Pulse 101   Temp 98.1  F (36.7  C) (Oral)   Wt 79 kg (174 lb 1.6 oz)   SpO2 99%   BMI 25.71 kg/m   Estimated body mass index is 25.71 kg/m  as calculated from the following:    Height as of 7/30/19: 1.753 m (5' 9\").    Weight as of this encounter: 79 kg (174 lb 1.6 oz). Body surface area is 1.96 meters squared.  No Pain (0) Comment: Data Unavailable   No LMP for male patient.  Allergies reviewed: Yes  Medications reviewed: Yes    Medications: Medication refills not needed today.  Pharmacy name entered into EPIC:    [email or web address] - [place] - [address] [facility] [address] [place], [place] [address]    Clinical concerns:       Stacey Lovell CMA              "

## 2019-08-02 NOTE — NURSING NOTE
Chief Complaint   Patient presents with     Blood Draw     labs drawn via cvc by RN     /85 (BP Location: Left arm, Patient Position: Chair, Cuff Size: Adult Regular)   Pulse 101   Temp 98.1  F (36.7  C) (Oral)   Wt 79 kg (174 lb 1.6 oz)   SpO2 99%   BMI 25.71 kg/m      Lines accessed by RN in lab. Labs collected through purple lumen and sent. Line flushed with NS. Pt tolerated well.     Maria Teresa Bourgeois RN

## 2019-08-02 NOTE — PROGRESS NOTES
Infusion Nursing Note:  Angel Yanez presents today for plat.    Patient seen by provider today: Yes: Fei Meng   present during visit today: Not Applicable.    Note: Patient received platelets today prior to PICC removal. Platelets were given by RN in clinic. VS remained stable during and post infusion. See flow sheets. See RN note for PICC removal details.    Intravenous Access:  PICC.    Treatment Conditions:  Lab Results   Component Value Date    HGB 9.4 08/02/2019     Lab Results   Component Value Date    WBC 5.2 08/02/2019      Lab Results   Component Value Date    ANEU 3.4 08/02/2019     Lab Results   Component Value Date    PLT 20 08/02/2019          Post Infusion Assessment:  Patient tolerated infusion without incident.       Discharge Plan:   Discharge instructions reviewed with: Patient.  Patient and/or family verbalized understanding of discharge instructions and all questions answered.  AVS to patient via SharePlow[employer].  Patient will return Next Tuesday for next appointment.   Patient discharged in stable condition accompanied by: self.  Departure Mode: Ambulatory.    Ayana Marion RN

## 2019-08-06 ENCOUNTER — APPOINTMENT (OUTPATIENT)
Dept: LAB | Facility: CLINIC | Age: 59
End: 2019-08-06
Attending: NURSE PRACTITIONER
Payer: COMMERCIAL

## 2019-08-06 ENCOUNTER — INFUSION THERAPY VISIT (OUTPATIENT)
Dept: TRANSPLANT | Facility: CLINIC | Age: 59
End: 2019-08-06
Attending: NURSE PRACTITIONER
Payer: COMMERCIAL

## 2019-08-06 VITALS
DIASTOLIC BLOOD PRESSURE: 77 MMHG | TEMPERATURE: 98.7 F | WEIGHT: 173.8 LBS | HEART RATE: 83 BPM | OXYGEN SATURATION: 98 % | BODY MASS INDEX: 25.67 KG/M2 | RESPIRATION RATE: 16 BRPM | SYSTOLIC BLOOD PRESSURE: 127 MMHG

## 2019-08-06 DIAGNOSIS — C90.01 MULTIPLE MYELOMA IN REMISSION (H): Primary | ICD-10-CM

## 2019-08-06 LAB
ALBUMIN SERPL-MCNC: 4.1 G/DL (ref 3.4–5)
ALP SERPL-CCNC: 84 U/L (ref 40–150)
ALT SERPL W P-5'-P-CCNC: 21 U/L (ref 0–70)
ANION GAP SERPL CALCULATED.3IONS-SCNC: 3 MMOL/L (ref 3–14)
AST SERPL W P-5'-P-CCNC: 16 U/L (ref 0–45)
BASOPHILS # BLD AUTO: 0 10E9/L (ref 0–0.2)
BASOPHILS NFR BLD AUTO: 0.3 %
BILIRUB SERPL-MCNC: 0.5 MG/DL (ref 0.2–1.3)
BLD PROD TYP BPU: NORMAL
BLD UNIT ID BPU: 0
BLOOD PRODUCT CODE: NORMAL
BPU ID: NORMAL
BUN SERPL-MCNC: 14 MG/DL (ref 7–30)
CALCIUM SERPL-MCNC: 9 MG/DL (ref 8.5–10.1)
CHLORIDE SERPL-SCNC: 108 MMOL/L (ref 94–109)
CO2 SERPL-SCNC: 27 MMOL/L (ref 20–32)
CREAT SERPL-MCNC: 0.8 MG/DL (ref 0.66–1.25)
DIFFERENTIAL METHOD BLD: ABNORMAL
EOSINOPHIL # BLD AUTO: 0.1 10E9/L (ref 0–0.7)
EOSINOPHIL NFR BLD AUTO: 3.1 %
ERYTHROCYTE [DISTWIDTH] IN BLOOD BY AUTOMATED COUNT: ABNORMAL % (ref 10–15)
GFR SERPL CREATININE-BSD FRML MDRD: >90 ML/MIN/{1.73_M2}
GLUCOSE SERPL-MCNC: 91 MG/DL (ref 70–99)
HCT VFR BLD AUTO: 31 % (ref 40–53)
HGB BLD-MCNC: 10.3 G/DL (ref 13.3–17.7)
IMM GRANULOCYTES # BLD: 0 10E9/L (ref 0–0.4)
IMM GRANULOCYTES NFR BLD: 0.3 %
LYMPHOCYTES # BLD AUTO: 1.3 10E9/L (ref 0.8–5.3)
LYMPHOCYTES NFR BLD AUTO: 44.1 %
MCH RBC QN AUTO: 34.4 PG (ref 26.5–33)
MCHC RBC AUTO-ENTMCNC: 33.2 G/DL (ref 31.5–36.5)
MCV RBC AUTO: 104 FL (ref 78–100)
MONOCYTES # BLD AUTO: 0.6 10E9/L (ref 0–1.3)
MONOCYTES NFR BLD AUTO: 19.3 %
NEUTROPHILS # BLD AUTO: 1 10E9/L (ref 1.6–8.3)
NEUTROPHILS NFR BLD AUTO: 32.9 %
NRBC # BLD AUTO: 0 10*3/UL
NRBC BLD AUTO-RTO: 0 /100
PLATELET # BLD AUTO: 29 10E9/L (ref 150–450)
PLATELET # BLD EST: ABNORMAL 10*3/UL
POTASSIUM SERPL-SCNC: 4.7 MMOL/L (ref 3.4–5.3)
PROT SERPL-MCNC: 7.5 G/DL (ref 6.8–8.8)
RBC # BLD AUTO: 2.99 10E12/L (ref 4.4–5.9)
SODIUM SERPL-SCNC: 138 MMOL/L (ref 133–144)
TRANSFUSION STATUS PATIENT QL: NORMAL
TRANSFUSION STATUS PATIENT QL: NORMAL
WBC # BLD AUTO: 2.9 10E9/L (ref 4–11)

## 2019-08-06 PROCEDURE — 40000268 ZZH STATISTIC NO CHARGES: Mod: ZF

## 2019-08-06 PROCEDURE — 25000128 H RX IP 250 OP 636: Mod: ZF | Performed by: STUDENT IN AN ORGANIZED HEALTH CARE EDUCATION/TRAINING PROGRAM

## 2019-08-06 PROCEDURE — 85025 COMPLETE CBC W/AUTO DIFF WBC: CPT | Performed by: PHYSICIAN ASSISTANT

## 2019-08-06 PROCEDURE — 96372 THER/PROPH/DIAG INJ SC/IM: CPT

## 2019-08-06 PROCEDURE — 80053 COMPREHEN METABOLIC PANEL: CPT | Performed by: PHYSICIAN ASSISTANT

## 2019-08-06 PROCEDURE — G0463 HOSPITAL OUTPT CLINIC VISIT: HCPCS | Mod: ZF

## 2019-08-06 RX ORDER — ALBUTEROL SULFATE 5 MG/ML
2.5 SOLUTION RESPIRATORY (INHALATION)
Status: CANCELLED
Start: 2019-08-15

## 2019-08-06 RX ORDER — PENTAMIDINE ISETHIONATE 300 MG/300MG
300 INHALANT RESPIRATORY (INHALATION)
Status: CANCELLED
Start: 2019-08-15

## 2019-08-06 RX ORDER — HEPARIN SODIUM,PORCINE 10 UNIT/ML
5 VIAL (ML) INTRAVENOUS
Status: CANCELLED | OUTPATIENT
Start: 2019-08-15

## 2019-08-06 RX ADMIN — FILGRASTIM 480 MCG: 480 INJECTION, SOLUTION INTRAVENOUS; SUBCUTANEOUS at 12:27

## 2019-08-06 ASSESSMENT — PAIN SCALES - GENERAL: PAINLEVEL: NO PAIN (0)

## 2019-08-06 NOTE — NURSING NOTE
Chief Complaint   Patient presents with     Blood Draw     Labs drawn via  by RN in lab. VS taken.      Labs drawn via venipuncture. Vital signs taken. Checked into next appointment.   Renee Holbrook RN

## 2019-08-06 NOTE — PROGRESS NOTES
BMT Clinic Progress Note   8/6/2019     Patient ID: Angel Yanez is a 60 yo man Day +81 s/p 2nd auto for IgM kappa MM .   Diagnosis MM Multiple myeloma  HCT Type Autologous    Prep Regimen Cytoxan  Melphalan   Donor Source Self     No  Primary BMT Provider Dr Lucio      Angel was admitted on 7/21/2019 for fevers, pseudomonal bacteremia tx with antibiotics, subsequent line removal; discharged 7/25/2019.     INTERVAL  HISTORY   Guille returns for follow up. No complaints today. No fevers. Anxious to know his WBC count.     Review of Systems: 10 point ROS negative except as noted above.     Physical Exam  /77   Pulse 83   Temp 98.7  F (37.1  C) (Oral)   Resp 16   Wt 78.8 kg (173 lb 12.8 oz)   SpO2 98%   BMI 25.67 kg/m       Wt Readings from Last 4 Encounters:   08/02/19 79 kg (174 lb 1.6 oz)   07/30/19 78.9 kg (173 lb 14.4 oz)   07/26/19 79.3 kg (174 lb 12.8 oz)   07/25/19 77.6 kg (171 lb)     General: NAD, pleasant  Eyes:  sclera anicteric and not injected  Nose/Mouth/Throat: OP moist, no ulcerations   Lungs: CTA bilaterally   Cardiovascular: regular rate and rhythm, no M/R/G   Abdominal/Rectal: +BS, soft, NT, ND   Lymphatics: no edema  Skin: no rash or petechiae   Neuro: non-focal, no gross deficits  Additional Findings: Left arm PICC    Labs:  Lab Results   Component Value Date    WBC 2.9 (L) 08/06/2019    ANEU 1.0 (L) 08/06/2019    HGB 10.3 (L) 08/06/2019    HCT 31.0 (L) 08/06/2019    PLT 29 (LL) 08/06/2019     08/02/2019    POTASSIUM 4.4 08/02/2019    CHLORIDE 108 08/02/2019    CO2 27 08/02/2019     (H) 08/02/2019    BUN 18 08/02/2019    CR 0.83 08/02/2019    MAG 1.8 07/21/2019    INR 1.16 (H) 07/22/2019    BILITOTAL 0.4 08/02/2019    AST 22 08/02/2019    ALT 18 08/02/2019    ALKPHOS 78 08/02/2019    PROTTOTAL 7.2 08/02/2019    ALBUMIN 3.7 08/02/2019       Assessment and Plan  Angel Yanez is a 60 yo man Day +81 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  BMT/MM:   Slow  engraftment; cell dose was only 0.639x10^6. ([facility] [place] - known poor cell dose as failed chemo-mobilization 1/2019.)   - requiring gcsf ~ 1x/wk.  Last given 7/30 with great response. Give today 8/6 (ANC 1.0)  - BM BX 6/11/2019:  No morphologic or immunophenotypic evidence of recurrent/persistent  plasma cell neoplasm     2.  HEME: Keep Hgb>8g/dL, plt >10. Plts 29k.  - Jennings removal 7/23; left arm PICC removed  - ongoing cytopenias - low cell dose with transplant + viral URI/bacteremia?   - Pt has RBC antibodies. Antibody work up completed 5/26. Hospitalist Confirmed with BB that the patient has no clinically significant antibodies     3.  ID: No fever since 7/21  - 7/21 Bacteremia ( 2/2 bc+) 7/22 and 7/23 bc are neg. Pseudomonas( positive by verigene) tobra daily through 7/23, jennings removed 7/23. Cont cefepime as sensitivities back 7/24. As peripheral bld cx 7/24 neg x24 hours, completed 10 days IV cefepime at home, last dose 8/2.   - RVP from 7/21 is positive for rhino virus.  Symptomatic management. IgG level added on 7/26  - +parainfluenza 3 (5/30). Ongoing cough, no hemoptysis.  Cough drops, tessalon pearls prn. 7/21 PIV3=neg  - prophy with ACV, Fluconazole, Pentamidine (7/15). Resumed azithro prophy  - CMV neg 7/15. On CMV vaccine trial.     4. GI:  No complaints.  - GI prophy- omeprazole.       5.  FEN/Renal: Creat, lytes wnl. Eating well.      6.  Mood:  Cont Paxil      Plan/RTC:  F/u Friday for possible gcsf/plts. Potentially decrease to weekly appts.   8/23 restaging/survivorship/labs  8/30 Dr. Naveed Jolly NP

## 2019-08-06 NOTE — PROGRESS NOTES
This is a recent snapshot of the patient's [facility] medical record.  For current drug dose and complete information and questions, call [phone] or In Basket pool, [facility]v home infusion ([id number])  CSN Number:  [id number]

## 2019-08-06 NOTE — NURSING NOTE
"Oncology Rooming Note    August 6, 2019 11:50 AM   Angel Yanez is a 59 year old male who presents for:    Chief Complaint   Patient presents with     Blood Draw     Labs drawn via  by RN in lab. VS taken.      RECHECK     Return: MM      Initial Vitals: /77   Pulse 83   Temp 98.7  F (37.1  C) (Oral)   Resp 16   Wt 78.8 kg (173 lb 12.8 oz)   SpO2 98%   BMI 25.67 kg/m   Estimated body mass index is 25.67 kg/m  as calculated from the following:    Height as of 7/30/19: 1.753 m (5' 9\").    Weight as of this encounter: 78.8 kg (173 lb 12.8 oz). Body surface area is 1.96 meters squared.  No Pain (0) Comment: Data Unavailable   No LMP for male patient.  Allergies reviewed: Yes  Medications reviewed: No- provider was in medications    Medications: Medication refills not needed today.  Pharmacy name entered into EPIC:    [facility] - [place], [place] - [address] [facility] - [place], [place] - [address]    Clinical concerns: None       Kathy Perez CMA              "

## 2019-08-06 NOTE — PROGRESS NOTES
Chief Complaint   Patient presents with     Infusion     possible transfusion s/p bmt txp for multiple myeloma     Labs were monitored and no transfusion needed.

## 2019-08-08 LAB
BLD PROD TYP BPU: NORMAL
NUM BPU REQUESTED: 1

## 2019-08-09 ENCOUNTER — INFUSION THERAPY VISIT (OUTPATIENT)
Dept: TRANSPLANT | Facility: CLINIC | Age: 59
End: 2019-08-09
Attending: PHYSICIAN ASSISTANT
Payer: COMMERCIAL

## 2019-08-09 ENCOUNTER — APPOINTMENT (OUTPATIENT)
Dept: LAB | Facility: CLINIC | Age: 59
End: 2019-08-09
Attending: PHYSICIAN ASSISTANT
Payer: COMMERCIAL

## 2019-08-09 VITALS
RESPIRATION RATE: 16 BRPM | HEART RATE: 85 BPM | OXYGEN SATURATION: 99 % | DIASTOLIC BLOOD PRESSURE: 79 MMHG | WEIGHT: 174.7 LBS | TEMPERATURE: 98.4 F | BODY MASS INDEX: 25.8 KG/M2 | SYSTOLIC BLOOD PRESSURE: 121 MMHG

## 2019-08-09 DIAGNOSIS — C90.01 MULTIPLE MYELOMA IN REMISSION (H): Primary | ICD-10-CM

## 2019-08-09 LAB
ANION GAP SERPL CALCULATED.3IONS-SCNC: 5 MMOL/L (ref 3–14)
BASOPHILS # BLD AUTO: 0 10E9/L (ref 0–0.2)
BASOPHILS NFR BLD AUTO: 0.2 %
BLD PROD TYP BPU: NORMAL
BLD UNIT ID BPU: 0
BLOOD PRODUCT CODE: NORMAL
BPU ID: NORMAL
BUN SERPL-MCNC: 9 MG/DL (ref 7–30)
CALCIUM SERPL-MCNC: 9 MG/DL (ref 8.5–10.1)
CHLORIDE SERPL-SCNC: 106 MMOL/L (ref 94–109)
CO2 SERPL-SCNC: 28 MMOL/L (ref 20–32)
CREAT SERPL-MCNC: 0.93 MG/DL (ref 0.66–1.25)
DIFFERENTIAL METHOD BLD: ABNORMAL
EOSINOPHIL # BLD AUTO: 0.1 10E9/L (ref 0–0.7)
EOSINOPHIL NFR BLD AUTO: 1.4 %
ERYTHROCYTE [DISTWIDTH] IN BLOOD BY AUTOMATED COUNT: ABNORMAL % (ref 10–15)
GFR SERPL CREATININE-BSD FRML MDRD: 88 ML/MIN/{1.73_M2}
GLUCOSE SERPL-MCNC: 91 MG/DL (ref 70–99)
HCT VFR BLD AUTO: 29.5 % (ref 40–53)
HGB BLD-MCNC: 9.7 G/DL (ref 13.3–17.7)
IMM GRANULOCYTES # BLD: 0.1 10E9/L (ref 0–0.4)
IMM GRANULOCYTES NFR BLD: 1.6 %
LYMPHOCYTES # BLD AUTO: 0.9 10E9/L (ref 0.8–5.3)
LYMPHOCYTES NFR BLD AUTO: 15.5 %
MCH RBC QN AUTO: 34.8 PG (ref 26.5–33)
MCHC RBC AUTO-ENTMCNC: 32.9 G/DL (ref 31.5–36.5)
MCV RBC AUTO: 106 FL (ref 78–100)
MONOCYTES # BLD AUTO: 0.5 10E9/L (ref 0–1.3)
MONOCYTES NFR BLD AUTO: 8.1 %
NEUTROPHILS # BLD AUTO: 4.1 10E9/L (ref 1.6–8.3)
NEUTROPHILS NFR BLD AUTO: 73.2 %
NRBC # BLD AUTO: 0 10*3/UL
NRBC BLD AUTO-RTO: 0 /100
PLATELET # BLD AUTO: 22 10E9/L (ref 150–450)
POTASSIUM SERPL-SCNC: 4 MMOL/L (ref 3.4–5.3)
RBC # BLD AUTO: 2.79 10E12/L (ref 4.4–5.9)
SODIUM SERPL-SCNC: 138 MMOL/L (ref 133–144)
TRANSFUSION STATUS PATIENT QL: NORMAL
TRANSFUSION STATUS PATIENT QL: NORMAL
WBC # BLD AUTO: 5.6 10E9/L (ref 4–11)

## 2019-08-09 PROCEDURE — 36415 COLL VENOUS BLD VENIPUNCTURE: CPT

## 2019-08-09 PROCEDURE — 80048 BASIC METABOLIC PNL TOTAL CA: CPT | Performed by: NURSE PRACTITIONER

## 2019-08-09 PROCEDURE — 85025 COMPLETE CBC W/AUTO DIFF WBC: CPT | Performed by: NURSE PRACTITIONER

## 2019-08-09 PROCEDURE — G0463 HOSPITAL OUTPT CLINIC VISIT: HCPCS | Mod: ZF

## 2019-08-09 RX ORDER — AZITHROMYCIN 250 MG/1
250 TABLET, FILM COATED ORAL DAILY
COMMUNITY
Start: 2019-08-09 | End: 2019-08-27

## 2019-08-09 ASSESSMENT — PAIN SCALES - GENERAL: PAINLEVEL: NO PAIN (0)

## 2019-08-09 NOTE — PROGRESS NOTES
BMT Clinic Progress Note      Patient ID: Angel Yanez is a 60 yo man Day +84 s/p 2nd auto for IgM kappa MM      Diagnosis MM Multiple myeloma  HCT Type Autologous    Prep Regimen Cytoxan  Melphalan   Donor Source Self     No  Primary BMT Provider Dr Lucio      Angel was admitted on 7/21/2019 for fevers, pseudomonal bacteremia tx with antibiotics, subsequent line removal; discharged 7/25/2019.     INTERVAL  HISTORY   Guille returns for follow up. No new complaints today. No fevers.  Stable chronic, non-productive cough.     Review of Systems: 10 point ROS negative except as noted above.     Physical Exam  Blood pressure 121/79, pulse 85, temperature 98.4  F (36.9  C), temperature source Oral, resp. rate 16, weight 79.2 kg (174 lb 11.2 oz), SpO2 99 %.    Wt Readings from Last 4 Encounters:   08/09/19 79.2 kg (174 lb 11.2 oz)   08/06/19 78.8 kg (173 lb 12.8 oz)   08/02/19 79 kg (174 lb 1.6 oz)   07/30/19 78.9 kg (173 lb 14.4 oz)     General: NAD, pleasant  Eyes:  sclera anicteric and not injected  Nose/Mouth/Throat: OP moist, no ulcerations   Lungs: CTA bilaterally   Cardiovascular: regular rate and rhythm, no M/R/G   Abdominal/Rectal: +BS, soft, NT, ND   Lymphatics: no edema  Skin: no rash or petechiae   Neuro: non-focal, no gross deficits  Additional Findings: Left arm PICC    Labs:  Lab Results   Component Value Date    WBC 2.9 (L) 08/06/2019    ANEU 1.0 (L) 08/06/2019    HGB 10.3 (L) 08/06/2019    HCT 31.0 (L) 08/06/2019    PLT 29 (LL) 08/06/2019     08/06/2019    POTASSIUM 4.7 08/06/2019    CHLORIDE 108 08/06/2019    CO2 27 08/06/2019    GLC 91 08/06/2019    BUN 14 08/06/2019    CR 0.80 08/06/2019    MAG 1.8 07/21/2019    INR 1.16 (H) 07/22/2019    BILITOTAL 0.5 08/06/2019    AST 16 08/06/2019    ALT 21 08/06/2019    ALKPHOS 84 08/06/2019    PROTTOTAL 7.5 08/06/2019    ALBUMIN 4.1 08/06/2019       Assessment and Plan  Angel Yanez is a 60 yo man Day +84 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   BMT/MM:   Slow engraftment; cell dose was only 0.639x10^6. ([facility] [place] - known poor cell dose as failed chemo-mobilization 1/2019.)   - requiring gcsf ~ 1x/wk.  Given on 8/6 (ANC 1.0)  - BM BX 6/11/2019:  No morphologic or immunophenotypic evidence of recurrent/persistent  plasma cell neoplasm     2.  HEME: Keep Hgb>8g/dL, plt >10. Plts 29k.  - Jennings removal 7/23; left arm PICC removed  - ongoing cytopenias - low cell dose with transplant + viral URI/bacteremia?   - Pt has RBC antibodies. Antibody work up completed 5/26. Hospitalist Confirmed with BB that the patient has no clinically significant antibodies     3.  ID: No fever since 7/21  - 7/21 Bacteremia ( 2/2 bc+) 7/22 and 7/23 bc are neg. Pseudomonas( positive by verigene) tobra daily through 7/23, jennings removed 7/23. Cont cefepime as sensitivities back 7/24. Completed 10 days IV cefepime at home, last dose 8/2.   - RVP from 7/21 is positive for rhino virus.  Symptomatic management. IgG level added on 7/26  - +parainfluenza 3 (5/30). Ongoing cough, no hemoptysis.  Cough drops, tessalon pearls prn. 7/21 PIV3=neg  - prophy with ACV, Fluconazole, Pentamidine (7/15). Resumed azithro prophy  - CMV neg 7/15. On CMV vaccine trial.     4. GI:  No complaints.  - GI prophy- omeprazole.       5.  FEN/Renal: Creat, lytes wnl. Eating well.      6.  Mood:  Cont Paxil      Plan/RTC:  F/u Tuesday for labs/provider; possible GCSF; pentamidine  8/23 restaging/survivorship/labs  8/30 Dr. Naveed Coulter pa-c  [phone]

## 2019-08-09 NOTE — NURSING NOTE
Chief Complaint   Patient presents with     Blood Draw     labs drawn by venipuncture by rn.  vital signs taken.     Labs drawn by venipuncture by RN in lab.  Vital signs taken.  Pt checked in to next appointment.  Caitlin Webster RN

## 2019-08-09 NOTE — NURSING NOTE
"Oncology Rooming Note    August 9, 2019 9:25 AM   Angel Yanez is a 59 year old male who presents for:    Chief Complaint   Patient presents with     Blood Draw     labs drawn by venipuncture by rn.  vital signs taken.     RECHECK     Return: MM      Initial Vitals: /79 (BP Location: Right arm, Patient Position: Sitting, Cuff Size: Adult Regular)   Pulse 85   Temp 98.4  F (36.9  C) (Oral)   Resp 16   Wt 79.2 kg (174 lb 11.2 oz)   SpO2 99%   BMI 25.80 kg/m   Estimated body mass index is 25.8 kg/m  as calculated from the following:    Height as of 7/30/19: 1.753 m (5' 9\").    Weight as of this encounter: 79.2 kg (174 lb 11.2 oz). Body surface area is 1.96 meters squared.  No Pain (0) Comment: Data Unavailable   No LMP for male patient.  Allergies reviewed: Yes  Medications reviewed: Yes    Medications: Medication refills not needed today.  Pharmacy name entered into EPIC:    [facility] [facility] [address] [facility] - [place], [place] - [address] [facility]    Clinical concerns: None       Kathy Perez CMA              "

## 2019-08-13 ENCOUNTER — INFUSION THERAPY VISIT (OUTPATIENT)
Dept: TRANSPLANT | Facility: CLINIC | Age: 59
End: 2019-08-13
Attending: INTERNAL MEDICINE
Payer: COMMERCIAL

## 2019-08-13 ENCOUNTER — APPOINTMENT (OUTPATIENT)
Dept: LAB | Facility: CLINIC | Age: 59
End: 2019-08-13
Attending: INTERNAL MEDICINE
Payer: COMMERCIAL

## 2019-08-13 VITALS
OXYGEN SATURATION: 98 % | HEART RATE: 77 BPM | DIASTOLIC BLOOD PRESSURE: 74 MMHG | SYSTOLIC BLOOD PRESSURE: 112 MMHG | WEIGHT: 171.8 LBS | BODY MASS INDEX: 25.37 KG/M2 | TEMPERATURE: 98.3 F

## 2019-08-13 DIAGNOSIS — C90.01 MULTIPLE MYELOMA IN REMISSION (H): Primary | ICD-10-CM

## 2019-08-13 DIAGNOSIS — C90.00 MULTIPLE MYELOMA NOT HAVING ACHIEVED REMISSION (H): ICD-10-CM

## 2019-08-13 LAB
ANION GAP SERPL CALCULATED.3IONS-SCNC: 4 MMOL/L (ref 3–14)
ANISOCYTOSIS BLD QL SMEAR: ABNORMAL
BASOPHILS # BLD AUTO: 0 10E9/L (ref 0–0.2)
BASOPHILS NFR BLD AUTO: 0 %
BLD PROD TYP BPU: NORMAL
BLD UNIT ID BPU: 0
BLOOD PRODUCT CODE: NORMAL
BPU ID: NORMAL
BUN SERPL-MCNC: 14 MG/DL (ref 7–30)
CALCIUM SERPL-MCNC: 9 MG/DL (ref 8.5–10.1)
CHLORIDE SERPL-SCNC: 107 MMOL/L (ref 94–109)
CO2 SERPL-SCNC: 29 MMOL/L (ref 20–32)
CREAT SERPL-MCNC: 0.92 MG/DL (ref 0.66–1.25)
DIFFERENTIAL METHOD BLD: ABNORMAL
EOSINOPHIL # BLD AUTO: 0 10E9/L (ref 0–0.7)
EOSINOPHIL NFR BLD AUTO: 0.9 %
ERYTHROCYTE [DISTWIDTH] IN BLOOD BY AUTOMATED COUNT: ABNORMAL % (ref 10–15)
GFR SERPL CREATININE-BSD FRML MDRD: 89 ML/MIN/{1.73_M2}
GLUCOSE SERPL-MCNC: 88 MG/DL (ref 70–99)
HCT VFR BLD AUTO: 29.4 % (ref 40–53)
HGB BLD-MCNC: 9.8 G/DL (ref 13.3–17.7)
LYMPHOCYTES # BLD AUTO: 0.7 10E9/L (ref 0.8–5.3)
LYMPHOCYTES NFR BLD AUTO: 28.4 %
MACROCYTES BLD QL SMEAR: PRESENT
MCH RBC QN AUTO: 35.4 PG (ref 26.5–33)
MCHC RBC AUTO-ENTMCNC: 33.3 G/DL (ref 31.5–36.5)
MCV RBC AUTO: 106 FL (ref 78–100)
MONOCYTES # BLD AUTO: 0.5 10E9/L (ref 0–1.3)
MONOCYTES NFR BLD AUTO: 21.6 %
NEUTROPHILS # BLD AUTO: 1.2 10E9/L (ref 1.6–8.3)
NEUTROPHILS NFR BLD AUTO: 49.1 %
NRBC # BLD AUTO: 0 10*3/UL
NRBC BLD AUTO-RTO: 1 /100
OVALOCYTES BLD QL SMEAR: ABNORMAL
PLATELET # BLD AUTO: 21 10E9/L (ref 150–450)
PLATELET # BLD EST: ABNORMAL 10*3/UL
POIKILOCYTOSIS BLD QL SMEAR: ABNORMAL
POTASSIUM SERPL-SCNC: 4.6 MMOL/L (ref 3.4–5.3)
RBC # BLD AUTO: 2.77 10E12/L (ref 4.4–5.9)
SODIUM SERPL-SCNC: 140 MMOL/L (ref 133–144)
TRANSFUSION STATUS PATIENT QL: NORMAL
TRANSFUSION STATUS PATIENT QL: NORMAL
WBC # BLD AUTO: 2.5 10E9/L (ref 4–11)

## 2019-08-13 PROCEDURE — 36415 COLL VENOUS BLD VENIPUNCTURE: CPT

## 2019-08-13 PROCEDURE — 94642 AEROSOL INHALATION TREATMENT: CPT

## 2019-08-13 PROCEDURE — 85025 COMPLETE CBC W/AUTO DIFF WBC: CPT | Performed by: PHYSICIAN ASSISTANT

## 2019-08-13 PROCEDURE — 80048 BASIC METABOLIC PNL TOTAL CA: CPT | Performed by: PHYSICIAN ASSISTANT

## 2019-08-13 PROCEDURE — 96372 THER/PROPH/DIAG INJ SC/IM: CPT

## 2019-08-13 PROCEDURE — 25000128 H RX IP 250 OP 636: Mod: ZF | Performed by: STUDENT IN AN ORGANIZED HEALTH CARE EDUCATION/TRAINING PROGRAM

## 2019-08-13 PROCEDURE — 25000125 ZZHC RX 250: Mod: ZF | Performed by: STUDENT IN AN ORGANIZED HEALTH CARE EDUCATION/TRAINING PROGRAM

## 2019-08-13 RX ORDER — ALBUTEROL SULFATE 0.83 MG/ML
2.5 SOLUTION RESPIRATORY (INHALATION)
Status: DISCONTINUED | OUTPATIENT
Start: 2019-08-13 | End: 2019-08-13 | Stop reason: HOSPADM

## 2019-08-13 RX ORDER — ALBUTEROL SULFATE 5 MG/ML
2.5 SOLUTION RESPIRATORY (INHALATION)
Status: CANCELLED
Start: 2019-08-15

## 2019-08-13 RX ORDER — HEPARIN SODIUM,PORCINE 10 UNIT/ML
5 VIAL (ML) INTRAVENOUS
Status: CANCELLED | OUTPATIENT
Start: 2019-08-15

## 2019-08-13 RX ORDER — PENTAMIDINE ISETHIONATE 300 MG/300MG
300 INHALANT RESPIRATORY (INHALATION)
Status: CANCELLED
Start: 2019-08-15

## 2019-08-13 RX ADMIN — FILGRASTIM 480 MCG: 480 INJECTION, SOLUTION INTRAVENOUS; SUBCUTANEOUS at 11:54

## 2019-08-13 RX ADMIN — PENTAMIDINE ISETHIONATE 300 MG: 300 INJECTION, POWDER, LYOPHILIZED, FOR SOLUTION INTRAMUSCULAR; INTRAVENOUS at 12:07

## 2019-08-13 RX ADMIN — ALBUTEROL SULFATE 2.5 MG: 2.5 SOLUTION RESPIRATORY (INHALATION) at 11:54

## 2019-08-13 ASSESSMENT — PAIN SCALES - GENERAL: PAINLEVEL: NO PAIN (0)

## 2019-08-13 NOTE — PROGRESS NOTES
BMT Clinic Progress Note      Patient ID: Angel Yanez is a 58 yo man Day +88 s/p 2nd auto for IgA kappa MM      Diagnosis MM Multiple myeloma  HCT Type Autologous    Prep Regimen Cytoxan  Melphalan   Donor Source Self     No  Primary BMT Provider Dr Lucio      Angel was admitted on 7/21/2019 for fevers, pseudomonal bacteremia tx with antibiotics, subsequent line removal; discharged 7/25/2019.     INTERVAL  HISTORY   Guille returns for follow up. No new complaints today. No fevers.  Stable chronic, non-productive cough.      Denies headaches, fevers, night sweats, fatigue, mouth sores, cp, sob, abdominal pain, nausea/vomiting, problems with bowel movements or urination.  Denies spontaneous bleeding or bruising, bone pain.     Review of Systems: 10 point ROS negative except as noted above.     Physical Exam    Wt Readings from Last 4 Encounters:   08/13/19 77.9 kg (171 lb 12.8 oz)   08/09/19 79.2 kg (174 lb 11.2 oz)   08/06/19 78.8 kg (173 lb 12.8 oz)   08/02/19 79 kg (174 lb 1.6 oz)     General: NAD, pleasant  Eyes:  sclera anicteric and not injected  Nose/Mouth/Throat: OP moist, no ulcerations   Lungs: CTA bilaterally   Cardiovascular: regular rate and rhythm, no M/R/G   Abdominal/Rectal: +BS, soft, NT, ND   Lymphatics: no edema  Skin: no rash or petechiae   Neuro: non-focal, no gross deficits  Psych: normal affect and mood    Labs:  Lab Results   Component Value Date    WBC 2.5 (L) 08/13/2019    ANEU 1.2 (L) 08/13/2019    HGB 9.8 (L) 08/13/2019    HCT 29.4 (L) 08/13/2019    PLT 21 (LL) 08/13/2019     08/13/2019    POTASSIUM 4.6 08/13/2019    CHLORIDE 107 08/13/2019    CO2 29 08/13/2019    GLC 88 08/13/2019    BUN 14 08/13/2019    CR 0.92 08/13/2019    MAG 1.8 07/21/2019    INR 1.16 (H) 07/22/2019    BILITOTAL 0.5 08/06/2019    AST 16 08/06/2019    ALT 21 08/06/2019    ALKPHOS 84 08/06/2019    PROTTOTAL 7.5 08/06/2019    ALBUMIN 4.1 08/06/2019       Assessment and Plan  Angel Yanez is a 58 yo man  Day +88 s/p 2nd auto PBSCT for IgA Kappa MM     1.  BMT/MM:   Slow engraftment; cell dose was only 0.639x10^6. ([facility] [place] - known poor cell dose as failed chemo-mobilization 1/2019.)   - requiring gcsf ~ 1x/wk.  Will give today for ANC 1.2  - BM BX 6/11/2019:  hypocellular, 5%, No morphologic or immunophenotypic evidence of recurrent/persistent  plasma cell neoplasm,     2.  HEME: Keep Hgb>8g/dL, plt >10.   - Jennings removal 7/23; left arm PICC removed  - ongoing cytopenias - low cell dose with transplant + viral URI/bacteremia?   - Pt has RBC antibodies. Antibody work up completed 5/26. Hospitalist Confirmed with BB that the patient has no clinically significant antibodies     3.  ID: No fever since 7/21  - 7/21 Bacteremia ( 2/2 bc+) 7/22 and 7/23 bc are neg. Pseudomonas( positive by verigene) tobra daily through 7/23, jennings removed 7/23. Cont cefepime as sensitivities back 7/24. Completed 10 days IV cefepime at home, last dose 8/2.   - RVP from 7/21 is positive for rhino virus.  Symptomatic management. IgG level added on 7/26  - +parainfluenza 3 (5/30). Ongoing cough, no hemoptysis.  Cough drops, tessalon pearls prn. 7/21 PIV3=neg  - prophy with ACV, Fluconazole, azithromycin, Pentamidine (7/15). Pentamidine will be administered today  - CMV neg 7/15. On CMV vaccine trial.     4. GI:  No complaints.  - GI prophy- omeprazole.       5.  FEN/Renal: Creat, lytes wnl. Eating well.      6.  Mood:  Cont Paxil      Plan/RTC:  8/23 restaging/survivorship/labs, make require weekly gsf  8/30 Dr. Lucio      Case and plan discussed with Dr. Nadira Ballesteros.    Jadyn Mora MD  Hematology Oncology Fellow  Pager: [phone]      Patient has been seen and evaluated by me. I have reviewed today's vital signs, medications, labs and imaging result independently. I have discussed the plan with the team and agree with the findings and plan in this note.    Nadira Ballesteros MD

## 2019-08-13 NOTE — NURSING NOTE
Neupogen 480 MCG. Sub-Q injection right arm . See MAR. Pt. Tolerated well.    Kathy VALLEJOA

## 2019-08-13 NOTE — NURSING NOTE
Chief Complaint   Patient presents with     Blood Draw     vitals and venipuncture done by CMA, patient requested to wait on placement of PIV in case he does not need the infusion        Arti Hui CMA on 8/13/2019 at 9:40 AM

## 2019-08-22 ENCOUNTER — APPOINTMENT (OUTPATIENT)
Dept: LAB | Facility: CLINIC | Age: 59
End: 2019-08-22
Payer: COMMERCIAL

## 2019-08-22 ENCOUNTER — HOSPITAL ENCOUNTER (OUTPATIENT)
Facility: CLINIC | Age: 59
Setting detail: SPECIMEN
Discharge: HOME | End: 2019-08-22
Admitting: PHYSICIAN ASSISTANT
Payer: COMMERCIAL

## 2019-08-22 DIAGNOSIS — C90.00 MULTIPLE MYELOMA NOT HAVING ACHIEVED REMISSION (H): Primary | ICD-10-CM

## 2019-08-22 LAB
COLLECT DURATION TIME UR: 24 H
COLLECT DURATION TIME UR: 24 H
CREAT 24H UR-MRATE: 1.46 G/(24.H) (ref 1–2)
CREAT UR-MCNC: 119 MG/DL
PROT 24H UR-MRATE: 0.09 G/(24.H) (ref 0.04–0.23)
PROT UR-MCNC: 0.07 G/L
PROT/CREAT 24H UR: 0.06 G/G CR (ref 0–0.2)
SPECIMEN VOL UR: 1230 ML
SPECIMEN VOL UR: 1230 ML

## 2019-08-22 PROCEDURE — 81050 URINALYSIS VOLUME MEASURE: CPT | Performed by: PHYSICIAN ASSISTANT

## 2019-08-22 PROCEDURE — 86335 IMMUNFIX E-PHORSIS/URINE/CSF: CPT | Performed by: PHYSICIAN ASSISTANT

## 2019-08-22 PROCEDURE — 84166 PROTEIN E-PHORESIS/URINE/CSF: CPT | Performed by: PHYSICIAN ASSISTANT

## 2019-08-22 PROCEDURE — 84156 ASSAY OF PROTEIN URINE: CPT | Performed by: PHYSICIAN ASSISTANT

## 2019-08-23 ENCOUNTER — HOSPITAL ENCOUNTER (OUTPATIENT)
Dept: PET IMAGING | Facility: CLINIC | Age: 59
Discharge: HOME | End: 2019-08-23
Attending: PHYSICIAN ASSISTANT | Admitting: PHYSICIAN ASSISTANT
Payer: COMMERCIAL

## 2019-08-23 ENCOUNTER — ONCOLOGY SURVIVORSHIP VISIT (OUTPATIENT)
Dept: TRANSPLANT | Facility: CLINIC | Age: 59
End: 2019-08-23
Attending: PHYSICIAN ASSISTANT
Payer: COMMERCIAL

## 2019-08-23 ENCOUNTER — HOSPITAL ENCOUNTER (OUTPATIENT)
Dept: GENERAL RADIOLOGY | Facility: CLINIC | Age: 59
End: 2019-08-23
Attending: PHYSICIAN ASSISTANT
Payer: COMMERCIAL

## 2019-08-23 VITALS
OXYGEN SATURATION: 97 % | HEART RATE: 85 BPM | BODY MASS INDEX: 25.74 KG/M2 | TEMPERATURE: 98.3 F | WEIGHT: 174.3 LBS | DIASTOLIC BLOOD PRESSURE: 81 MMHG | SYSTOLIC BLOOD PRESSURE: 127 MMHG

## 2019-08-23 VITALS
OXYGEN SATURATION: 97 % | WEIGHT: 174.3 LBS | BODY MASS INDEX: 25.74 KG/M2 | TEMPERATURE: 98.3 F | HEART RATE: 85 BPM | DIASTOLIC BLOOD PRESSURE: 81 MMHG | SYSTOLIC BLOOD PRESSURE: 127 MMHG

## 2019-08-23 DIAGNOSIS — C90.00 MULTIPLE MYELOMA NOT HAVING ACHIEVED REMISSION (H): ICD-10-CM

## 2019-08-23 DIAGNOSIS — C90.00 MULTIPLE MYELOMA NOT HAVING ACHIEVED REMISSION (H): Primary | ICD-10-CM

## 2019-08-23 DIAGNOSIS — C90.01 MULTIPLE MYELOMA IN REMISSION (H): ICD-10-CM

## 2019-08-23 DIAGNOSIS — C90.00 MULTIPLE MYELOMA, REMISSION STATUS UNSPECIFIED (H): Primary | ICD-10-CM

## 2019-08-23 LAB
ALBUMIN SERPL-MCNC: 4.1 G/DL (ref 3.4–5)
ALP SERPL-CCNC: 80 U/L (ref 40–150)
ALT SERPL W P-5'-P-CCNC: 20 U/L (ref 0–70)
ANION GAP SERPL CALCULATED.3IONS-SCNC: 4 MMOL/L (ref 3–14)
ANISOCYTOSIS BLD QL SMEAR: ABNORMAL
AST SERPL W P-5'-P-CCNC: 18 U/L (ref 0–45)
BASOPHILS # BLD AUTO: 0 10E9/L (ref 0–0.2)
BASOPHILS NFR BLD AUTO: 0.4 %
BILIRUB SERPL-MCNC: 0.4 MG/DL (ref 0.2–1.3)
BUN SERPL-MCNC: 14 MG/DL (ref 7–30)
CALCIUM SERPL-MCNC: 8.8 MG/DL (ref 8.5–10.1)
CHLORIDE SERPL-SCNC: 107 MMOL/L (ref 94–109)
CO2 SERPL-SCNC: 28 MMOL/L (ref 20–32)
CREAT SERPL-MCNC: 0.73 MG/DL (ref 0.66–1.25)
DACRYOCYTES BLD QL SMEAR: SLIGHT
DIFFERENTIAL METHOD BLD: ABNORMAL
EOSINOPHIL # BLD AUTO: 0 10E9/L (ref 0–0.7)
EOSINOPHIL NFR BLD AUTO: 1.4 %
ERYTHROCYTE [DISTWIDTH] IN BLOOD BY AUTOMATED COUNT: ABNORMAL % (ref 10–15)
GFR SERPL CREATININE-BSD FRML MDRD: >90 ML/MIN/{1.73_M2}
GLUCOSE SERPL-MCNC: 85 MG/DL (ref 70–99)
HCT VFR BLD AUTO: 29.5 % (ref 40–53)
HGB BLD-MCNC: 9.8 G/DL (ref 13.3–17.7)
IMM GRANULOCYTES # BLD: 0 10E9/L (ref 0–0.4)
IMM GRANULOCYTES NFR BLD: 0.4 %
LDH SERPL L TO P-CCNC: 200 U/L (ref 85–227)
LYMPHOCYTES # BLD AUTO: 1.4 10E9/L (ref 0.8–5.3)
LYMPHOCYTES NFR BLD AUTO: 48.8 %
MACROCYTES BLD QL SMEAR: PRESENT
MAGNESIUM SERPL-MCNC: 2.2 MG/DL (ref 1.6–2.3)
MCH RBC QN AUTO: 36 PG (ref 26.5–33)
MCHC RBC AUTO-ENTMCNC: 33.2 G/DL (ref 31.5–36.5)
MCV RBC AUTO: 109 FL (ref 78–100)
MONOCYTES # BLD AUTO: 0.4 10E9/L (ref 0–1.3)
MONOCYTES NFR BLD AUTO: 13.7 %
NEUTROPHILS # BLD AUTO: 1 10E9/L (ref 1.6–8.3)
NEUTROPHILS NFR BLD AUTO: 35.3 %
NRBC # BLD AUTO: 0 10*3/UL
NRBC BLD AUTO-RTO: 0 /100
OVALOCYTES BLD QL SMEAR: SLIGHT
PHOSPHATE SERPL-MCNC: 3.6 MG/DL (ref 2.5–4.5)
PLATELET # BLD AUTO: 24 10E9/L (ref 150–450)
PLATELET # BLD EST: ABNORMAL 10*3/UL
POIKILOCYTOSIS BLD QL SMEAR: SLIGHT
POTASSIUM SERPL-SCNC: 3.9 MMOL/L (ref 3.4–5.3)
PROT ELPH PNL UR ELPH: NORMAL
PROT PATTERN UR ELPH-IMP: NORMAL
PROT SERPL-MCNC: 7.3 G/DL (ref 6.8–8.8)
RBC # BLD AUTO: 2.72 10E12/L (ref 4.4–5.9)
SODIUM SERPL-SCNC: 139 MMOL/L (ref 133–144)
URATE SERPL-MCNC: 4.2 MG/DL (ref 3.5–7.2)
WBC # BLD AUTO: 2.9 10E9/L (ref 4–11)

## 2019-08-23 PROCEDURE — 40000424 ZZHCL STATISTIC BONE MARROW CORE PERF TC 38221: Performed by: PHYSICIAN ASSISTANT

## 2019-08-23 PROCEDURE — 80053 COMPREHEN METABOLIC PANEL: CPT | Performed by: PHYSICIAN ASSISTANT

## 2019-08-23 PROCEDURE — 40000795 ZZHCL STATISTIC DNA PROCESS AND HOLD: Performed by: PHYSICIAN ASSISTANT

## 2019-08-23 PROCEDURE — G0463 HOSPITAL OUTPT CLINIC VISIT: HCPCS | Mod: ZF

## 2019-08-23 PROCEDURE — 00000058 ZZHCL STATISTIC BONE MARROW ASP PERF TC 38220: Performed by: PHYSICIAN ASSISTANT

## 2019-08-23 PROCEDURE — 78816 PET IMAGE W/CT FULL BODY: CPT | Mod: PS

## 2019-08-23 PROCEDURE — 83735 ASSAY OF MAGNESIUM: CPT | Performed by: PHYSICIAN ASSISTANT

## 2019-08-23 PROCEDURE — 88280 CHROMOSOME KARYOTYPE STUDY: CPT | Performed by: PHYSICIAN ASSISTANT

## 2019-08-23 PROCEDURE — 84165 PROTEIN E-PHORESIS SERUM: CPT | Performed by: PHYSICIAN ASSISTANT

## 2019-08-23 PROCEDURE — 00000161 ZZHCL STATISTIC H-SPHEME PROCESS B/S: Performed by: PHYSICIAN ASSISTANT

## 2019-08-23 PROCEDURE — 88161 CYTOPATH SMEAR OTHER SOURCE: CPT | Performed by: PHYSICIAN ASSISTANT

## 2019-08-23 PROCEDURE — 83883 ASSAY NEPHELOMETRY NOT SPEC: CPT | Performed by: PHYSICIAN ASSISTANT

## 2019-08-23 PROCEDURE — 84100 ASSAY OF PHOSPHORUS: CPT | Performed by: PHYSICIAN ASSISTANT

## 2019-08-23 PROCEDURE — 88341 IMHCHEM/IMCYTCHM EA ADD ANTB: CPT | Performed by: PHYSICIAN ASSISTANT

## 2019-08-23 PROCEDURE — 88184 FLOWCYTOMETRY/ TC 1 MARKER: CPT | Performed by: PHYSICIAN ASSISTANT

## 2019-08-23 PROCEDURE — 96372 THER/PROPH/DIAG INJ SC/IM: CPT

## 2019-08-23 PROCEDURE — 88185 FLOWCYTOMETRY/TC ADD-ON: CPT | Performed by: PHYSICIAN ASSISTANT

## 2019-08-23 PROCEDURE — 77075 RADEX OSSEOUS SURVEY COMPL: CPT

## 2019-08-23 PROCEDURE — 86334 IMMUNOFIX E-PHORESIS SERUM: CPT | Performed by: PHYSICIAN ASSISTANT

## 2019-08-23 PROCEDURE — 83615 LACTATE (LD) (LDH) ENZYME: CPT | Performed by: PHYSICIAN ASSISTANT

## 2019-08-23 PROCEDURE — 34300033 ZZH RX 343: Performed by: PHYSICIAN ASSISTANT

## 2019-08-23 PROCEDURE — 88271 CYTOGENETICS DNA PROBE: CPT | Performed by: PHYSICIAN ASSISTANT

## 2019-08-23 PROCEDURE — 88342 IMHCHEM/IMCYTCHM 1ST ANTB: CPT | Performed by: PHYSICIAN ASSISTANT

## 2019-08-23 PROCEDURE — 82784 ASSAY IGA/IGD/IGG/IGM EACH: CPT | Performed by: PHYSICIAN ASSISTANT

## 2019-08-23 PROCEDURE — 84550 ASSAY OF BLOOD/URIC ACID: CPT | Performed by: PHYSICIAN ASSISTANT

## 2019-08-23 PROCEDURE — 40000951 ZZHCL STATISTIC BONE MARROW INTERP TC 85097: Performed by: PHYSICIAN ASSISTANT

## 2019-08-23 PROCEDURE — 00000402 ZZHCL STATISTIC TOTAL PROTEIN: Performed by: PHYSICIAN ASSISTANT

## 2019-08-23 PROCEDURE — 38222 DX BONE MARROW BX & ASPIR: CPT | Mod: ZF

## 2019-08-23 PROCEDURE — 85025 COMPLETE CBC W/AUTO DIFF WBC: CPT | Performed by: PHYSICIAN ASSISTANT

## 2019-08-23 PROCEDURE — A9552 F18 FDG: HCPCS | Performed by: PHYSICIAN ASSISTANT

## 2019-08-23 PROCEDURE — 36416 COLLJ CAPILLARY BLOOD SPEC: CPT | Performed by: PHYSICIAN ASSISTANT

## 2019-08-23 PROCEDURE — 88311 DECALCIFY TISSUE: CPT | Performed by: PHYSICIAN ASSISTANT

## 2019-08-23 PROCEDURE — 88313 SPECIAL STAINS GROUP 2: CPT | Performed by: PHYSICIAN ASSISTANT

## 2019-08-23 PROCEDURE — 93010 ELECTROCARDIOGRAM REPORT: CPT | Mod: ZP | Performed by: INTERNAL MEDICINE

## 2019-08-23 PROCEDURE — 88264 CHROMOSOME ANALYSIS 20-25: CPT | Performed by: PHYSICIAN ASSISTANT

## 2019-08-23 PROCEDURE — 88305 TISSUE EXAM BY PATHOLOGIST: CPT | Performed by: PHYSICIAN ASSISTANT

## 2019-08-23 PROCEDURE — 25000128 H RX IP 250 OP 636: Mod: ZF | Performed by: STUDENT IN AN ORGANIZED HEALTH CARE EDUCATION/TRAINING PROGRAM

## 2019-08-23 PROCEDURE — 40000611 ZZHCL STATISTIC MORPHOLOGY W/INTERP HEMEPATH TC 85060: Performed by: PHYSICIAN ASSISTANT

## 2019-08-23 PROCEDURE — 88237 TISSUE CULTURE BONE MARROW: CPT | Performed by: PHYSICIAN ASSISTANT

## 2019-08-23 PROCEDURE — 40001004 ZZHCL STATISTIC FLOW INT 9-15 ABY TC 88188: Performed by: PHYSICIAN ASSISTANT

## 2019-08-23 PROCEDURE — 88275 CYTOGENETICS 100-300: CPT | Performed by: PHYSICIAN ASSISTANT

## 2019-08-23 RX ORDER — PENTAMIDINE ISETHIONATE 300 MG/300MG
300 INHALANT RESPIRATORY (INHALATION)
Status: CANCELLED
Start: 2019-09-15

## 2019-08-23 RX ORDER — ALBUTEROL SULFATE 5 MG/ML
2.5 SOLUTION RESPIRATORY (INHALATION)
Status: CANCELLED
Start: 2019-09-15

## 2019-08-23 RX ORDER — HEPARIN SODIUM,PORCINE 10 UNIT/ML
5 VIAL (ML) INTRAVENOUS
Status: CANCELLED | OUTPATIENT
Start: 2019-09-15

## 2019-08-23 RX ADMIN — FILGRASTIM 480 MCG: 480 INJECTION, SOLUTION INTRAVENOUS; SUBCUTANEOUS at 14:33

## 2019-08-23 RX ADMIN — FLUDEOXYGLUCOSE F-18 10.28 MCI.: 500 INJECTION, SOLUTION INTRAVENOUS at 07:35

## 2019-08-23 ASSESSMENT — PAIN SCALES - GENERAL
PAINLEVEL: NO PAIN (0)
PAINLEVEL: NO PAIN (0)

## 2019-08-23 NOTE — NURSING NOTE
"Oncology Rooming Note    August 23, 2019 10:43 AM   Angel Yanez is a 59 year old male who presents for:    Chief Complaint   Patient presents with     Clinical Trials     EKG for research     RECHECK     Return: 100 Day Survivorship - MM      Initial Vitals: There were no vitals taken for this visit. Estimated body mass index is 25.37 kg/m  as calculated from the following:    Height as of 7/30/19: 1.753 m (5' 9\").    Weight as of 8/13/19: 77.9 kg (171 lb 12.8 oz). There is no height or weight on file to calculate BSA.  Data Unavailable Comment: Data Unavailable   No LMP for male patient.  Allergies reviewed: Yes  Medications reviewed: Yes    Medications: Medication refills not needed today.  Pharmacy name entered into EPIC:    [facility] - [place] - [address] [facility] [address] - [place], [place] [address]    Clinical concerns: None       Kathy Perez CMA            "

## 2019-08-23 NOTE — PROGRESS NOTES
BMT ONC Adult Bone Marrow Biopsy Procedure Note  August 23, 2019  /81   Pulse 85   Temp 98.3  F (36.8  C) (Oral)   Wt 79.1 kg (174 lb 4.8 oz)   SpO2 97%   BMI 25.74 kg/m       Learning needs assessment complete within 12 months? YES    DIAGNOSIS: multiple myeloma     PROCEDURE: Unilateral Bone Marrow Biopsy and Unilateral Aspirate    LOCATION: [facility]    Patient s identification was positively verified by verbal identification and invasive procedure safety checklist was completed. Informed consent was obtained. Patient was placed in the prone position and prepped and draped in a sterile manner. Approximately 10 cc of 1% Lidocaine was used over the left posterior iliac spine. Following this a 3 mm incision was made. Trephine bone marrow core(s) was (were) obtained from the IC. Bone marrow aspirates were obtained from the IC. Aspirates were sent for morphology, immunophenotyping, cytogenetics and molecular diagnostics RFLP. A total of approximately 20 ml of marrow was aspirated. Following this procedure a sterile dressing was applied to the bone marrow biopsy site(s). The patient was placed in the supine position to maintain pressure on the biopsy site. Post-procedure wound care instructions were given.     Complications: NO    Pre-procedural pain: 0 out of 10 on the numeric pain rating scale.     Procedural pain: 0 out of 10 on the numeric pain rating scale.     Post-procedural pain assessment: 0 out of 10 on the numeric pain rating scale.     Interventions: NO    Length of procedure:20 minutes or less      Procedure performed by: Mony Pitts PAC  [phone]
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
## 2019-08-23 NOTE — NURSING NOTE
Patient supine for 30 minutes following biopsy. After 30 minutes, dressing clean, dry and intact. Vital signs stable. See DOC flow sheets for details. Left ambulatory with family member.    BRITTNY HART RN

## 2019-08-26 LAB
ALBUMIN SERPL ELPH-MCNC: 4.5 G/DL (ref 3.7–5.1)
ALPHA1 GLOB SERPL ELPH-MCNC: 0.4 G/DL (ref 0.2–0.4)
ALPHA2 GLOB SERPL ELPH-MCNC: 0.8 G/DL (ref 0.5–0.9)
B-GLOBULIN SERPL ELPH-MCNC: 0.6 G/DL (ref 0.6–1)
COPATH REPORT: NORMAL
COPATH REPORT: NORMAL
GAMMA GLOB SERPL ELPH-MCNC: 0.7 G/DL (ref 0.7–1.6)
IGA SERPL-MCNC: 15 MG/DL (ref 70–380)
IGG SERPL-MCNC: 824 MG/DL (ref 695–1620)
IGM SERPL-MCNC: 25 MG/DL (ref 60–265)
INTERPRETATION ECG - MUSE: NORMAL
KAPPA LC UR-MCNC: 0.34 MG/DL (ref 0.33–1.94)
KAPPA LC/LAMBDA SER: 0.58 {RATIO} (ref 0.26–1.65)
LAMBDA LC SERPL-MCNC: 0.59 MG/DL (ref 0.57–2.63)
M PROTEIN SERPL ELPH-MCNC: 0 G/DL
PROT PATTERN SERPL ELPH-IMP: NORMAL
PROT PATTERN SERPL IFE-IMP: NORMAL

## 2019-08-27 ENCOUNTER — OFFICE VISIT (OUTPATIENT)
Dept: TRANSPLANT | Facility: CLINIC | Age: 59
End: 2019-08-27
Attending: INTERNAL MEDICINE
Payer: COMMERCIAL

## 2019-08-27 ENCOUNTER — APPOINTMENT (OUTPATIENT)
Dept: LAB | Facility: CLINIC | Age: 59
End: 2019-08-27
Attending: INTERNAL MEDICINE
Payer: COMMERCIAL

## 2019-08-27 VITALS
WEIGHT: 176.5 LBS | HEART RATE: 100 BPM | SYSTOLIC BLOOD PRESSURE: 118 MMHG | BODY MASS INDEX: 26.14 KG/M2 | OXYGEN SATURATION: 96 % | DIASTOLIC BLOOD PRESSURE: 77 MMHG | HEIGHT: 69 IN | TEMPERATURE: 98.1 F | RESPIRATION RATE: 16 BRPM

## 2019-08-27 DIAGNOSIS — C90.01 MULTIPLE MYELOMA IN REMISSION (H): ICD-10-CM

## 2019-08-27 DIAGNOSIS — C90.00 MULTIPLE MYELOMA, REMISSION STATUS UNSPECIFIED (H): Primary | ICD-10-CM

## 2019-08-27 PROCEDURE — 25000128 H RX IP 250 OP 636: Mod: ZF | Performed by: STUDENT IN AN ORGANIZED HEALTH CARE EDUCATION/TRAINING PROGRAM

## 2019-08-27 PROCEDURE — 96372 THER/PROPH/DIAG INJ SC/IM: CPT

## 2019-08-27 PROCEDURE — G0463 HOSPITAL OUTPT CLINIC VISIT: HCPCS | Mod: ZF

## 2019-08-27 RX ORDER — PENTAMIDINE ISETHIONATE 300 MG/300MG
300 INHALANT RESPIRATORY (INHALATION)
Status: CANCELLED
Start: 2019-09-15

## 2019-08-27 RX ORDER — HEPARIN SODIUM,PORCINE 10 UNIT/ML
5 VIAL (ML) INTRAVENOUS
Status: CANCELLED | OUTPATIENT
Start: 2019-09-15

## 2019-08-27 RX ORDER — ALBUTEROL SULFATE 5 MG/ML
2.5 SOLUTION RESPIRATORY (INHALATION)
Status: CANCELLED
Start: 2019-09-15

## 2019-08-27 RX ADMIN — FILGRASTIM 480 MCG: 480 INJECTION, SOLUTION INTRAVENOUS; SUBCUTANEOUS at 16:41

## 2019-08-27 ASSESSMENT — PAIN SCALES - GENERAL: PAINLEVEL: NO PAIN (0)

## 2019-08-27 ASSESSMENT — MIFFLIN-ST. JEOR: SCORE: 1595.98
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
## 2019-08-27 NOTE — NURSING NOTE
Pt was given his GCSF in his left arm with no concerns.     Pt tolerated this well.    Shoshana Salazar MA

## 2019-08-27 NOTE — PROGRESS NOTES
"/77 (BP Location: Right arm, Patient Position: Sitting, Cuff Size: Adult Regular)   Pulse 100   Temp 98.1  F (36.7  C) (Oral)   Resp 16   Ht 1.753 m (5' 9\")   Wt 80.1 kg (176 lb 8 oz)   SpO2 96%   BMI 26.06 kg/m     Wt Readings from Last 4 Encounters:   08/27/19 80.1 kg (176 lb 8 oz)   08/23/19 79.1 kg (174 lb 4.8 oz)   08/23/19 79.1 kg (174 lb 4.8 oz)   08/13/19 77.9 kg (171 lb 12.8 oz)   Guille returns for follow-up 102 days after his second autotransplant for myeloma.  He has had line-associated infections and respiratory virus but those are both resolved.  His counts are slow because of a small cell dose in his graft but he has not needed transfusions in some time.  He still received episodic growth factor treatment.    He is eating well and has no oral ENT skin bone joint or cardiac symptoms.  He has an occasional cough, perhaps associated with a post nasal drip but no dyspnea.  The rest of his review of systems is unremarkable.      His exam shows his weight is slowly increasing his vital signs are acceptable.  His lungs are clear and his heart tones are regular without a gallop.  His abdomen is soft and nontender.  He has no peripheral edema.  There is no focal bone tenderness at any site.  His oropharynx is clear without mucosal lesions and he can breathe out of both sides of his nose.    Laboratory testing showed low blood counts but acceptable chemistries. ELP and immunofixation were negative in serum and urine.  Bone marrow biopsy was 5% cellular with no morphologic or immunophenotypic evidence of myeloma.  A PET scan was negative for signs of hypermetabolic activity and a bone x-ray showed no changes in his previous myeloma associated bone abnormality since May.      He will receive growth factor today and will check his counts in a week.    We have discontinued his fluconazole and azithromycin but will continue CMV suppression and VZV suppression with acyclovir 800 mg twice daily until his " counts are more secure and is a bit farther from transplant at which point will provide recombinant varicella vaccine as Shingrix.    He appears to be in stringent complete remission though his counts are still low but otherwise is doing well.  He will return in 1 week's time    Adebayo Lucio MD    Professor of medicine      Results for SAMEER KNIGHT (MRN [id number]) as of 8/28/2019 09:14   Ref. Range 8/23/2019 09:13 8/23/2019 10:03 8/23/2019 10:35 8/23/2019 13:01 8/23/2019 13:02 8/23/2019 13:20 8/23/2019 13:23   Sodium Latest Ref Range: 133 - 144 mmol/L    139      Potassium Latest Ref Range: 3.4 - 5.3 mmol/L    3.9      Chloride Latest Ref Range: 94 - 109 mmol/L    107      Carbon Dioxide Latest Ref Range: 20 - 32 mmol/L    28      Urea Nitrogen Latest Ref Range: 7 - 30 mg/dL    14      Creatinine Latest Ref Range: 0.66 - 1.25 mg/dL    0.73      GFR Estimate Latest Ref Range: >60 mL/min/1.73_m2    >90      GFR Estimate If Black Latest Ref Range: >60 mL/min/1.73_m2    >90      Calcium Latest Ref Range: 8.5 - 10.1 mg/dL    8.8      Anion Gap Latest Ref Range: 3 - 14 mmol/L    4      Magnesium Latest Ref Range: 1.6 - 2.3 mg/dL    2.2      Phosphorus Latest Ref Range: 2.5 - 4.5 mg/dL    3.6      Albumin Latest Ref Range: 3.4 - 5.0 g/dL    4.1      Protein Total Latest Ref Range: 6.8 - 8.8 g/dL    7.3      Bilirubin Total Latest Ref Range: 0.2 - 1.3 mg/dL    0.4      Alkaline Phosphatase Latest Ref Range: 40 - 150 U/L    80      ALT Latest Ref Range: 0 - 70 U/L    20      AST Latest Ref Range: 0 - 45 U/L    18      Lactate Dehydrogenase Latest Ref Range: 85 - 227 U/L    200      Uric Acid Latest Ref Range: 3.5 - 7.2 mg/dL    4.2      Glucose Latest Ref Range: 70 - 99 mg/dL    85      WBC Latest Ref Range: 4.0 - 11.0 10e9/L    2.9 (L)      Hemoglobin Latest Ref Range: 13.3 - 17.7 g/dL    9.8 (L)      Hematocrit Latest Ref Range: 40.0 - 53.0 %    29.5 (L)      Platelet Count Latest Ref Range: 150 - 450 10e9/L    24  (LL)      RBC Count Latest Ref Range: 4.4 - 5.9 10e12/L    2.72 (L)      MCV Latest Ref Range: 78 - 100 fl    109 (H)      MCH Latest Ref Range: 26.5 - 33.0 pg    36.0 (H)      MCHC Latest Ref Range: 31.5 - 36.5 g/dL    33.2      RDW Latest Ref Range: 10.0 - 15.0 %    Dimorphic population - unable to calculate      Diff Method Unknown    Automated Method      % Neutrophils Latest Units: %    35.3      % Lymphocytes Latest Units: %    48.8      % Monocytes Latest Units: %    13.7      % Eosinophils Latest Units: %    1.4      % Basophils Latest Units: %    0.4      % Immature Granulocytes Latest Units: %    0.4      Nucleated RBCs Latest Ref Range: 0 /100    0      Absolute Neutrophil Latest Ref Range: 1.6 - 8.3 10e9/L    1.0 (L)      Absolute Lymphocytes Latest Ref Range: 0.8 - 5.3 10e9/L    1.4      Absolute Monocytes Latest Ref Range: 0.0 - 1.3 10e9/L    0.4      Absolute Eosinophils Latest Ref Range: 0.0 - 0.7 10e9/L    0.0      Absolute Basophils Latest Ref Range: 0.0 - 0.2 10e9/L    0.0      Abs Immature Granulocytes Latest Ref Range: 0 - 0.4 10e9/L    0.0      Absolute Nucleated RBC Unknown    0.0      Anisocytosis Unknown    Marked      Poikilocytosis Unknown    Slight      Teardrop Cells Unknown    Slight      Ovalocytes Unknown    Slight      Macrocytes Unknown    Present      Platelet Estimate Unknown    Confirming automated cell count      CMV DNA Quantitation Specimen Unknown     Plasma, EDTA anticoagulant     CMV Quant IU/mL Latest Ref Range: CMVND^CMV DNA Not Detected [IU]/mL     CMV DNA Not Detected     Log IU/mL of CMVQNT Latest Ref Range: <2.1 Log_IU/mL     Not Calculated     Albumin Fraction Latest Ref Range: 3.7 - 5.1 g/dL    4.5      Alpha 1 Fraction Latest Ref Range: 0.2 - 0.4 g/dL    0.4      Alpha 2 Fraction Latest Ref Range: 0.5 - 0.9 g/dL    0.8      Beta Fraction Latest Ref Range: 0.6 - 1.0 g/dL    0.6      CHROMOSOME BONE MARROW Unknown       Rpt   ELP Interpretation: Unknown     Essentially lynsey...      FISH Unknown       Rpt   Gamma Fraction Latest Ref Range: 0.7 - 1.6 g/dL    0.7      IGA Latest Ref Range: 70 - 380 mg/dL    15 (L)      IGG Latest Ref Range: 695 - 1,620 mg/dL    824      IGM Latest Ref Range: 60 - 265 mg/dL    25 (L)      Immunofixation ELP Unknown    No monoclonal pro...      Kappa Free Lt Chain Latest Ref Range: 0.33 - 1.94 mg/dL    0.34      Kappa Lambda Ratio Latest Ref Range: 0.26 - 1.65     0.58      Lambda Free Lt Chain Latest Ref Range: 0.57 - 2.63 mg/dL    0.59      LEUKEMIA LYMPHOMA EVALUATION (FLOW CYTOMETRY) Unknown       Rpt   Monoclonal Peak Latest Ref Range: 0.0 g/dL    0.0      PROCESS AND HOLD DNA Unknown       Rpt   BONE MARROW BIOPSY Unknown      Rpt    XR BONE SURVEY COMPLETE Unknown  Rpt        PET ONCOLOGY WHOLE BODY Unknown Rpt         EKG 12-LEAD, TRACING ONLY Unknown   Rpt       Patient Name: SHIMON KNIGHT   MR#: [id number]   Specimen #: [id number]   Collected: 8/23/2019   Received: 8/23/2019   Reported: 8/26/2019 16:08   Ordering Phy(s): KAILYN SAUCEDO   Additional Phy(s): Copy to [employer]     For improved result formatting, select 'View Enhanced Report Format' under    Linked Documents section.     TEST(S):   Unilateral Bone Marrow Biopsy/Aspiration     FINAL DIAGNOSIS:   Bone marrow, posterior iliac crest, left decalcified trephine biopsy and   touch imprint; direct aspirate smear,   and concentrated aspirate smear; and peripheral blood smear:     - No morphologic or immunophenotypic evidence of recurrent/persistent   plasma cell neoplasm   - Hypocellular marrow for age (5% cellularity) with diminished   hematopoiesis and no increase in plasma cells   - Peripheral blood showing pancytopenia with moderate macrocytic anemia,   moderate leukopenia with neutropenia,   and marked thrombocytopenia     COMMENT:   By separate report ([id number]), flow cytometric immunophenotyping   performed on a marrow aspirate sample   reports polytypic  plasma cells.     I have personally reviewed all specimens and/or slides, including the   listed special stains, and used them   with my medical judgment to determine the final diagnosis.     Electronically signed out by:   Alejandra Sun M.D., [facility]     Bone Survey     History: Multiple myeloma not having achieved remission (H)     Comparison: PET CT from the same date, bone survey May 6, 2019.     Findings:     Bones are diffusely osteopenic, compromising assessment.     Chest: No suspicious lytic lesion. Lungs are clear. Cardiomediastinal  silhouette is within normal limits.     Thoracic and lumbar spine: Multiple compression fracture deformities  of the thoracic and lumbar vertebrae which are mild-to-moderate,  similar to comparison.     12 rib bearing vertebral bodies and 5 lumbar type vertebral bodies  with lumbarized S1 are assumed for the purpose of this dictation.     Multilevel degenerative changes spine including disc vacuum phenomenon  at L5-S1. Lower lumbar spine predominantly facet arthropathies.     Pelvis: No suspicious lytic lesion. Severe left hip osteoarthrosis  without ossicle lateral to the acetabulum, similar to prior. Right  total hip arthroplasty change.     Vascular calcifications.     Bilateral lower extremities: No suspicious lytic lesion.      Bilateral upper extremities: No suspicious lytic lesion. Severe  triscaphe degenerative change bilaterally. Enthesopathic change of  greater tuberosity bilaterally.     Skull: Multiple nonspecific lucencies of skull, similar to prior.                                                                      Impression:   1. Overall, no substantial change in multiple compression fracture  deformities of thoracic and lumbar vertebrae.  2. Diffuse osteopenic appearance, which may be seen in a setting of  diffuse myelomatous involvement.     Combined Report of:    PET and CT on  8/23/2019 9:13 AM :     1. PET of the neck, chest, abdomen, and  pelvis.  2. PET CT Fusion for Attenuation Correction and Anatomical  Localization:    3. 3D MIP and PET-CT fused images were processed on an independent  workstation and archived to PACS and reviewed by a radiologist.     Technique:     1. PET: The patient received 1028 mCi of F-18-FDG; the serum glucose  was 91 prior to administration, body weight was 77.9 kg. Images were  evaluated in the axial, sagittal, and coronal planes as well as the  rotational whole body MIP. Images were acquired from the Vertex to the  Feet.     UPTAKE WAS MEASURED AT 63 MINUTES.      BACKGROUND:  Liver SUV max= 3.723,   Aorta Blood SUV Max: 2.88.      2. CT: CT only obtained for attenuation correction and not diagnostic  purposes.     INDICATION: mm s/p AUto pbsct; Multiple myeloma not having achieved  remission (H)     ADDITIONAL INFORMATION OBTAINED FROM EMR: 58 yo male?Day +65?s/p 2nd  auto for IgB kappa MM     COMPARISON: 7/21/2019     FINDINGS:      HEAD/NECK:  There is no suspicious FDG uptake in the neck.      CHEST:  There is no suspicious FDG uptake in the chest.      ABDOMEN AND PELVIS:  There is no suspicious FDG uptake in the abdomen or pelvis.     LOWER EXTREMITIES:   No abnormal masses or hypermetabolic lesions.     BONES:   There are no suspicious lytic or blastic osseous lesions.  There is no  abnormal FDG uptake in the skeleton.                                                                      IMPRESSION: In this patient with FDG negative multiple myeloma:  No evidence of hypermetabolic disease in the neck, chest, abdomen or  pelvis

## 2019-08-27 NOTE — NURSING NOTE
"Oncology Rooming Note    August 27, 2019 4:07 PM   Angel Yanez is a 59 year old male who presents for:    Chief Complaint   Patient presents with     RECHECK     Day 100 ban review- Multiple myeloma     Initial Vitals: /77 (BP Location: Right arm, Patient Position: Sitting, Cuff Size: Adult Regular)   Pulse 100   Temp 98.1  F (36.7  C) (Oral)   Resp 16   Ht 1.753 m (5' 9\")   Wt 80.1 kg (176 lb 8 oz)   SpO2 96%   BMI 26.06 kg/m   Estimated body mass index is 26.06 kg/m  as calculated from the following:    Height as of this encounter: 1.753 m (5' 9\").    Weight as of this encounter: 80.1 kg (176 lb 8 oz). Body surface area is 1.97 meters squared.  No Pain (0) Comment: Data Unavailable   No LMP for male patient.  Allergies reviewed: Yes  Medications reviewed: Yes    Medications: Medication refills not needed today.  Pharmacy name entered into EPIC:    [facility] [place] [facility] [address]  [facility] - [place], [place] - [address]    Clinical concerns: N/A      Yesica Duncan CMA              "

## 2019-09-03 ENCOUNTER — ONCOLOGY VISIT (OUTPATIENT)
Dept: TRANSPLANT | Facility: CLINIC | Age: 59
End: 2019-09-03
Attending: INTERNAL MEDICINE
Payer: COMMERCIAL

## 2019-09-03 ENCOUNTER — APPOINTMENT (OUTPATIENT)
Dept: LAB | Facility: CLINIC | Age: 59
End: 2019-09-03
Attending: INTERNAL MEDICINE
Payer: COMMERCIAL

## 2019-09-03 VITALS
OXYGEN SATURATION: 99 % | WEIGHT: 174.6 LBS | SYSTOLIC BLOOD PRESSURE: 122 MMHG | HEART RATE: 89 BPM | RESPIRATION RATE: 16 BRPM | TEMPERATURE: 98.6 F | BODY MASS INDEX: 25.78 KG/M2 | DIASTOLIC BLOOD PRESSURE: 81 MMHG

## 2019-09-03 DIAGNOSIS — C90.00 MULTIPLE MYELOMA, REMISSION STATUS UNSPECIFIED (H): ICD-10-CM

## 2019-09-03 LAB
ALBUMIN SERPL-MCNC: 3.8 G/DL (ref 3.4–5)
ALP SERPL-CCNC: 76 U/L (ref 40–150)
ALT SERPL W P-5'-P-CCNC: 15 U/L (ref 0–70)
ANION GAP SERPL CALCULATED.3IONS-SCNC: 4 MMOL/L (ref 3–14)
AST SERPL W P-5'-P-CCNC: 16 U/L (ref 0–45)
BASOPHILS # BLD AUTO: 0 10E9/L (ref 0–0.2)
BASOPHILS NFR BLD AUTO: 0.4 %
BILIRUB SERPL-MCNC: 0.4 MG/DL (ref 0.2–1.3)
BUN SERPL-MCNC: 13 MG/DL (ref 7–30)
CALCIUM SERPL-MCNC: 9 MG/DL (ref 8.5–10.1)
CHLORIDE SERPL-SCNC: 109 MMOL/L (ref 94–109)
CO2 SERPL-SCNC: 25 MMOL/L (ref 20–32)
COPATH REPORT: NORMAL
CREAT SERPL-MCNC: 0.82 MG/DL (ref 0.66–1.25)
DIFFERENTIAL METHOD BLD: ABNORMAL
EOSINOPHIL # BLD AUTO: 0.1 10E9/L (ref 0–0.7)
EOSINOPHIL NFR BLD AUTO: 2.4 %
ERYTHROCYTE [DISTWIDTH] IN BLOOD BY AUTOMATED COUNT: 19.1 % (ref 10–15)
GFR SERPL CREATININE-BSD FRML MDRD: >90 ML/MIN/{1.73_M2}
GLUCOSE SERPL-MCNC: 89 MG/DL (ref 70–99)
HCT VFR BLD AUTO: 29.3 % (ref 40–53)
HGB BLD-MCNC: 9.8 G/DL (ref 13.3–17.7)
IMM GRANULOCYTES # BLD: 0 10E9/L (ref 0–0.4)
IMM GRANULOCYTES NFR BLD: 0 %
LYMPHOCYTES # BLD AUTO: 0.7 10E9/L (ref 0.8–5.3)
LYMPHOCYTES NFR BLD AUTO: 29.6 %
MCH RBC QN AUTO: 37.5 PG (ref 26.5–33)
MCHC RBC AUTO-ENTMCNC: 33.4 G/DL (ref 31.5–36.5)
MCV RBC AUTO: 112 FL (ref 78–100)
MONOCYTES # BLD AUTO: 0.4 10E9/L (ref 0–1.3)
MONOCYTES NFR BLD AUTO: 17.8 %
NEUTROPHILS # BLD AUTO: 1.2 10E9/L (ref 1.6–8.3)
NEUTROPHILS NFR BLD AUTO: 49.8 %
NRBC # BLD AUTO: 0 10*3/UL
NRBC BLD AUTO-RTO: 0 /100
PLATELET # BLD AUTO: 22 10E9/L (ref 150–450)
POTASSIUM SERPL-SCNC: 4.7 MMOL/L (ref 3.4–5.3)
PROT SERPL-MCNC: 7.1 G/DL (ref 6.8–8.8)
RBC # BLD AUTO: 2.61 10E12/L (ref 4.4–5.9)
SODIUM SERPL-SCNC: 138 MMOL/L (ref 133–144)
WBC # BLD AUTO: 2.5 10E9/L (ref 4–11)

## 2019-09-03 PROCEDURE — 80053 COMPREHEN METABOLIC PANEL: CPT | Performed by: INTERNAL MEDICINE

## 2019-09-03 PROCEDURE — 85025 COMPLETE CBC W/AUTO DIFF WBC: CPT | Performed by: INTERNAL MEDICINE

## 2019-09-03 PROCEDURE — 36415 COLL VENOUS BLD VENIPUNCTURE: CPT

## 2019-09-03 PROCEDURE — G0463 HOSPITAL OUTPT CLINIC VISIT: HCPCS | Mod: ZF

## 2019-09-03 ASSESSMENT — PAIN SCALES - GENERAL: PAINLEVEL: NO PAIN (0)

## 2019-09-03 NOTE — NURSING NOTE
"Oncology Rooming Note    September 3, 2019 9:38 AM   Angel Yanez is a 59 year old male who presents for:    Chief Complaint   Patient presents with     Blood Draw     labs drawn via vpt by rn. vs taken      RECHECK     Return: Multiple Myeloma      Initial Vitals: /81 (BP Location: Right arm, Patient Position: Sitting, Cuff Size: Adult Regular)   Pulse 89   Temp 98.6  F (37  C) (Oral)   Resp 16   Wt 79.2 kg (174 lb 9.6 oz)   SpO2 99%   BMI 25.78 kg/m   Estimated body mass index is 25.78 kg/m  as calculated from the following:    Height as of 8/27/19: 1.753 m (5' 9\").    Weight as of this encounter: 79.2 kg (174 lb 9.6 oz). Body surface area is 1.96 meters squared.  No Pain (0) Comment: Data Unavailable   No LMP for male patient.  Allergies reviewed: Yes  Medications reviewed: Yes    Medications: Medication refills not needed today.  Pharmacy name entered into EPIC:    [email or web address] [place] [facility] [address]  [facility] - [place], [place] - [address]    Clinical concerns: None       Kathy Perez CMA              "

## 2019-09-03 NOTE — NURSING NOTE
Chief Complaint   Patient presents with     Blood Draw     labs drawn via vpt by rn. vs taken      Blood drawn via vpt by RN in lab. VS taken. Pt checked into next appointment.   Parris Lua RN

## 2019-09-03 NOTE — PROGRESS NOTES
BMT Clinic Progress Note      Patient ID: Angel Yanez is a 58 yo man Day +109 s/p 2nd auto for IgM kappa MM      Diagnosis MM Multiple myeloma  HCT Type Autologous    Prep Regimen Cytoxan  Melphalan   Donor Source Self     No  Primary BMT Provider Dr Lucio         INTERVAL  HISTORY   Guille returns for follow up. No new complaints today. No fevers or infectious issues.      Review of Systems: 10 point ROS negative except as noted above.     Physical Exam  Blood pressure 122/81, pulse 89, temperature 98.6  F (37  C), temperature source Oral, resp. rate 16, weight 79.2 kg (174 lb 9.6 oz), SpO2 99 %.    Wt Readings from Last 4 Encounters:   09/03/19 79.2 kg (174 lb 9.6 oz)   08/27/19 80.1 kg (176 lb 8 oz)   08/23/19 79.1 kg (174 lb 4.8 oz)   08/23/19 79.1 kg (174 lb 4.8 oz)     General: NAD, pleasant  Eyes:  sclera anicteric and not injected  Nose/Mouth/Throat: OP moist, no ulcerations   Lungs: CTA bilaterally   Cardiovascular: regular rate and rhythm, no M/R/G   Lymphatics: no edema  Skin: no rash or petechiae   Neuro: non-focal, no gross deficits  No access.     Assessment and Plan  Angel Yanez is a 58 yo man Day +109 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  BMT/MM:   Slow engraftment; cell dose was only 0.639x10^6. ([facility] [place] - known poor cell dose as failed chemo-mobilization 1/2019.)   -Stringent CR.     2.  HEME: Keep Hgb>8g/dL, plt >10.   - ongoing cytopenias - low cell dose with transplant. ANC 1200 today. Will not give GCSF and re-eval next week. He is aware counts low he has N95 mask that he wore for clinic visit today just to be on safe side. Azithro not resumed. Plts stable w/o transfusions.   - Pt has RBC antibodies. Antibody work up completed 5/26. Hospitalist Confirmed with BB that the patient has no clinically significant antibodies     3.  ID:  - prophy with ACV and Pentamidine (8/13).   - On CMV vaccine trial.     4. GI:  No complaints.  - GI prophy- omeprazole.       5.   FEN/Renal: Creat, lytes wnl. Eating well.      6.  Mood:  Cont Paxil    RTC: 1 week due to counts-possible GCSF needed.       JESICA StarkC  #[phone]

## 2019-09-04 ENCOUNTER — TELEPHONE (OUTPATIENT)
Dept: TRANSPLANT | Facility: CLINIC | Age: 59
End: 2019-09-04

## 2019-09-04 NOTE — TELEPHONE ENCOUNTER
Called with c/o sinus pressure & post nasal gtt that started last night.  Chronic cough is improving. No fever.  No other symptoms.  In clinic yesterday & not neutropenic.  Will not start abx at this time but instructed him to call if he develops fever or symptoms worsen    Karla Manzo    Pt is experiencing symptoms, call back number is [phone]    [facility]  [phone]

## 2019-09-10 ENCOUNTER — ONCOLOGY VISIT (OUTPATIENT)
Dept: TRANSPLANT | Facility: CLINIC | Age: 59
End: 2019-09-10
Attending: PHYSICIAN ASSISTANT
Payer: COMMERCIAL

## 2019-09-10 ENCOUNTER — APPOINTMENT (OUTPATIENT)
Dept: LAB | Facility: CLINIC | Age: 59
End: 2019-09-10
Attending: PHYSICIAN ASSISTANT
Payer: COMMERCIAL

## 2019-09-10 VITALS
BODY MASS INDEX: 26.01 KG/M2 | OXYGEN SATURATION: 97 % | SYSTOLIC BLOOD PRESSURE: 118 MMHG | DIASTOLIC BLOOD PRESSURE: 75 MMHG | HEART RATE: 89 BPM | TEMPERATURE: 97.9 F | WEIGHT: 176.1 LBS

## 2019-09-10 DIAGNOSIS — C90.01 MULTIPLE MYELOMA IN REMISSION (H): ICD-10-CM

## 2019-09-10 DIAGNOSIS — C90.00 MULTIPLE MYELOMA, REMISSION STATUS UNSPECIFIED (H): Primary | ICD-10-CM

## 2019-09-10 LAB
ANION GAP SERPL CALCULATED.3IONS-SCNC: 5 MMOL/L (ref 3–14)
BASOPHILS # BLD AUTO: 0 10E9/L (ref 0–0.2)
BASOPHILS NFR BLD AUTO: 0.9 %
BUN SERPL-MCNC: 13 MG/DL (ref 7–30)
CALCIUM SERPL-MCNC: 8.8 MG/DL (ref 8.5–10.1)
CHLORIDE SERPL-SCNC: 106 MMOL/L (ref 94–109)
CO2 SERPL-SCNC: 27 MMOL/L (ref 20–32)
CREAT SERPL-MCNC: 0.87 MG/DL (ref 0.66–1.25)
DIFFERENTIAL METHOD BLD: ABNORMAL
EOSINOPHIL # BLD AUTO: 0.1 10E9/L (ref 0–0.7)
EOSINOPHIL NFR BLD AUTO: 5.2 %
ERYTHROCYTE [DISTWIDTH] IN BLOOD BY AUTOMATED COUNT: 17 % (ref 10–15)
GFR SERPL CREATININE-BSD FRML MDRD: >90 ML/MIN/{1.73_M2}
GLUCOSE SERPL-MCNC: 90 MG/DL (ref 70–99)
HCT VFR BLD AUTO: 29.3 % (ref 40–53)
HGB BLD-MCNC: 9.8 G/DL (ref 13.3–17.7)
IMM GRANULOCYTES # BLD: 0 10E9/L (ref 0–0.4)
IMM GRANULOCYTES NFR BLD: 0 %
LYMPHOCYTES # BLD AUTO: 0.9 10E9/L (ref 0.8–5.3)
LYMPHOCYTES NFR BLD AUTO: 39.1 %
MCH RBC QN AUTO: 37.7 PG (ref 26.5–33)
MCHC RBC AUTO-ENTMCNC: 33.4 G/DL (ref 31.5–36.5)
MCV RBC AUTO: 113 FL (ref 78–100)
MONOCYTES # BLD AUTO: 0.4 10E9/L (ref 0–1.3)
MONOCYTES NFR BLD AUTO: 16.1 %
NEUTROPHILS # BLD AUTO: 0.9 10E9/L (ref 1.6–8.3)
NEUTROPHILS NFR BLD AUTO: 38.7 %
NRBC # BLD AUTO: 0 10*3/UL
NRBC BLD AUTO-RTO: 0 /100
PLATELET # BLD AUTO: 21 10E9/L (ref 150–450)
POTASSIUM SERPL-SCNC: 3.9 MMOL/L (ref 3.4–5.3)
RBC # BLD AUTO: 2.6 10E12/L (ref 4.4–5.9)
SODIUM SERPL-SCNC: 138 MMOL/L (ref 133–144)
WBC # BLD AUTO: 2.3 10E9/L (ref 4–11)

## 2019-09-10 PROCEDURE — 25000128 H RX IP 250 OP 636: Mod: ZF | Performed by: STUDENT IN AN ORGANIZED HEALTH CARE EDUCATION/TRAINING PROGRAM

## 2019-09-10 PROCEDURE — G0463 HOSPITAL OUTPT CLINIC VISIT: HCPCS | Mod: 25

## 2019-09-10 PROCEDURE — 96372 THER/PROPH/DIAG INJ SC/IM: CPT

## 2019-09-10 PROCEDURE — 85025 COMPLETE CBC W/AUTO DIFF WBC: CPT | Performed by: PHYSICIAN ASSISTANT

## 2019-09-10 PROCEDURE — 36415 COLL VENOUS BLD VENIPUNCTURE: CPT

## 2019-09-10 PROCEDURE — 80048 BASIC METABOLIC PNL TOTAL CA: CPT | Performed by: PHYSICIAN ASSISTANT

## 2019-09-10 RX ORDER — HEPARIN SODIUM,PORCINE 10 UNIT/ML
5 VIAL (ML) INTRAVENOUS
Status: CANCELLED | OUTPATIENT
Start: 2019-09-15

## 2019-09-10 RX ORDER — ALBUTEROL SULFATE 5 MG/ML
2.5 SOLUTION RESPIRATORY (INHALATION)
Status: CANCELLED
Start: 2019-09-15

## 2019-09-10 RX ORDER — AZITHROMYCIN 250 MG/1
250 TABLET, FILM COATED ORAL DAILY
COMMUNITY
Start: 2019-09-10 | End: 2019-09-17

## 2019-09-10 RX ORDER — PENTAMIDINE ISETHIONATE 300 MG/300MG
300 INHALANT RESPIRATORY (INHALATION)
Status: CANCELLED
Start: 2019-09-15

## 2019-09-10 RX ADMIN — FILGRASTIM 480 MCG: 480 INJECTION, SOLUTION INTRAVENOUS; SUBCUTANEOUS at 08:41

## 2019-09-10 ASSESSMENT — PAIN SCALES - GENERAL: PAINLEVEL: NO PAIN (0)

## 2019-09-10 NOTE — PROGRESS NOTES
BMT Clinic Progress Note      Patient ID: Angel Yanez is a 60 yo man Day +116 s/p 2nd auto for IgM kappa MM      Diagnosis MM Multiple myeloma  HCT Type Autologous    Prep Regimen Cytoxan  Melphalan   Donor Source Self     No  Primary BMT Provider Dr Lucio         INTERVAL  HISTORY     Guille is seen for weekly follow up. Has had a cold for about one week. Mostly sinus congestion, not sinus pain or headache. No fevers. Symptoms have been improving. No sore throat. Stable chronic cough. No n/v/d/c. No bleeding. No rash.      Review of Systems: 10 point ROS negative except as noted above.     Physical Exam  Blood pressure 118/75, pulse 89, temperature 97.9  F (36.6  C), temperature source Oral, weight 79.9 kg (176 lb 1.6 oz), SpO2 97 %.    Wt Readings from Last 4 Encounters:   09/10/19 79.9 kg (176 lb 1.6 oz)   09/03/19 79.2 kg (174 lb 9.6 oz)   08/27/19 80.1 kg (176 lb 8 oz)   08/23/19 79.1 kg (174 lb 4.8 oz)     General: NAD, pleasant  Eyes:  sclera anicteric and not injected  Nose/Mouth/Throat: OP moist, no ulcerations   Lungs: CTA bilaterally   Cardiovascular: regular rate and rhythm, no M/R/G   Lymphatics: no edema  Skin: no rash or petechiae   Neuro: non-focal, no gross deficits  No access.     Labs  Lab Results   Component Value Date    WBC 2.3 (L) 09/10/2019    ANEU 0.9 (L) 09/10/2019    HGB 9.8 (L) 09/10/2019    HCT 29.3 (L) 09/10/2019    PLT 21 (LL) 09/10/2019     09/10/2019    POTASSIUM 3.9 09/10/2019    CHLORIDE 106 09/10/2019    CO2 27 09/10/2019    GLC 90 09/10/2019    BUN 13 09/10/2019    CR 0.87 09/10/2019    MAG 2.2 08/23/2019    INR 1.16 (H) 07/22/2019    BILITOTAL 0.4 09/03/2019    AST 16 09/03/2019    ALT 15 09/03/2019    ALKPHOS 76 09/03/2019    PROTTOTAL 7.1 09/03/2019    ALBUMIN 3.8 09/03/2019     Assessment and Plan  Angel Yanez is a 60 yo man Day +116 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  BMT/MM:   Slow engraftment; cell dose was only 0.639x10^6. ([facility] [place] - known  poor cell dose as failed chemo-mobilization 1/2019.)   -Stringent CR.     2.  HEME: Keep Hgb>8g/dL, plt >10.   - ongoing cytopenias - low cell dose with transplant. . Give GCSF today. He is aware counts low he has N95 mask that he wore for clinic visit today just to be on safe side. Azithro resumed 9/10. Plts stable w/o transfusions.   - Pt has RBC antibodies. Antibody work up completed 5/26. Hospitalist Confirmed with BB that the patient has no clinically significant antibodies     3.  ID:  - prophy with ACV and Pentamidine (8/13). Restart azithro 250mg/d as prophy with intermittent neutropenia requiring GCSF. Due for pentamidine next week  - On CMV vaccine trial.     4. GI:  No complaints.  - GI prophy- omeprazole.       5.  FEN/Renal: Creat, lytes wnl. Eating well.      6.  Mood:  Cont Paxil    RTC: 1 week due to counts-possible GCSF. Due for pentamidine next visit      Alejandra Adorno PA-C  [phone]

## 2019-09-11 LAB — COPATH REPORT: NORMAL

## 2019-09-13 LAB — COPATH REPORT: NORMAL

## 2019-09-17 ENCOUNTER — APPOINTMENT (OUTPATIENT)
Dept: LAB | Facility: CLINIC | Age: 59
End: 2019-09-17
Attending: PHYSICIAN ASSISTANT
Payer: COMMERCIAL

## 2019-09-17 ENCOUNTER — ONCOLOGY VISIT (OUTPATIENT)
Dept: TRANSPLANT | Facility: CLINIC | Age: 59
End: 2019-09-17
Attending: PHYSICIAN ASSISTANT
Payer: COMMERCIAL

## 2019-09-17 VITALS
BODY MASS INDEX: 26.11 KG/M2 | OXYGEN SATURATION: 98 % | WEIGHT: 176.8 LBS | TEMPERATURE: 96.7 F | SYSTOLIC BLOOD PRESSURE: 106 MMHG | DIASTOLIC BLOOD PRESSURE: 69 MMHG | RESPIRATION RATE: 16 BRPM | HEART RATE: 88 BPM

## 2019-09-17 DIAGNOSIS — C90.00 MULTIPLE MYELOMA NOT HAVING ACHIEVED REMISSION (H): ICD-10-CM

## 2019-09-17 DIAGNOSIS — C90.00 MULTIPLE MYELOMA, REMISSION STATUS UNSPECIFIED (H): Primary | ICD-10-CM

## 2019-09-17 DIAGNOSIS — C90.01 MULTIPLE MYELOMA IN REMISSION (H): ICD-10-CM

## 2019-09-17 LAB
ANION GAP SERPL CALCULATED.3IONS-SCNC: 5 MMOL/L (ref 3–14)
BASOPHILS # BLD AUTO: 0 10E9/L (ref 0–0.2)
BASOPHILS NFR BLD AUTO: 0.4 %
BUN SERPL-MCNC: 16 MG/DL (ref 7–30)
CALCIUM SERPL-MCNC: 8.9 MG/DL (ref 8.5–10.1)
CHLORIDE SERPL-SCNC: 110 MMOL/L (ref 94–109)
CO2 SERPL-SCNC: 26 MMOL/L (ref 20–32)
CREAT SERPL-MCNC: 0.81 MG/DL (ref 0.66–1.25)
DIFFERENTIAL METHOD BLD: ABNORMAL
EOSINOPHIL # BLD AUTO: 0.2 10E9/L (ref 0–0.7)
EOSINOPHIL NFR BLD AUTO: 9.9 %
ERYTHROCYTE [DISTWIDTH] IN BLOOD BY AUTOMATED COUNT: 16 % (ref 10–15)
GFR SERPL CREATININE-BSD FRML MDRD: >90 ML/MIN/{1.73_M2}
GLUCOSE SERPL-MCNC: 84 MG/DL (ref 70–99)
HCT VFR BLD AUTO: 28.9 % (ref 40–53)
HGB BLD-MCNC: 9.7 G/DL (ref 13.3–17.7)
IMM GRANULOCYTES # BLD: 0 10E9/L (ref 0–0.4)
IMM GRANULOCYTES NFR BLD: 0 %
LYMPHOCYTES # BLD AUTO: 1 10E9/L (ref 0.8–5.3)
LYMPHOCYTES NFR BLD AUTO: 42.6 %
MCH RBC QN AUTO: 38.3 PG (ref 26.5–33)
MCHC RBC AUTO-ENTMCNC: 33.6 G/DL (ref 31.5–36.5)
MCV RBC AUTO: 114 FL (ref 78–100)
MONOCYTES # BLD AUTO: 0.5 10E9/L (ref 0–1.3)
MONOCYTES NFR BLD AUTO: 21.1 %
NEUTROPHILS # BLD AUTO: 0.6 10E9/L (ref 1.6–8.3)
NEUTROPHILS NFR BLD AUTO: 26 %
NRBC # BLD AUTO: 0 10*3/UL
NRBC BLD AUTO-RTO: 0 /100
PLATELET # BLD AUTO: 22 10E9/L (ref 150–450)
PLATELET # BLD EST: ABNORMAL 10*3/UL
POTASSIUM SERPL-SCNC: 4.1 MMOL/L (ref 3.4–5.3)
RBC # BLD AUTO: 2.53 10E12/L (ref 4.4–5.9)
SODIUM SERPL-SCNC: 141 MMOL/L (ref 133–144)
WBC # BLD AUTO: 2.2 10E9/L (ref 4–11)

## 2019-09-17 PROCEDURE — 25000128 H RX IP 250 OP 636: Mod: ZF | Performed by: STUDENT IN AN ORGANIZED HEALTH CARE EDUCATION/TRAINING PROGRAM

## 2019-09-17 PROCEDURE — 94642 AEROSOL INHALATION TREATMENT: CPT

## 2019-09-17 PROCEDURE — 36415 COLL VENOUS BLD VENIPUNCTURE: CPT

## 2019-09-17 PROCEDURE — G0463 HOSPITAL OUTPT CLINIC VISIT: HCPCS | Mod: 25

## 2019-09-17 PROCEDURE — 80048 BASIC METABOLIC PNL TOTAL CA: CPT | Performed by: PHYSICIAN ASSISTANT

## 2019-09-17 PROCEDURE — 96372 THER/PROPH/DIAG INJ SC/IM: CPT

## 2019-09-17 PROCEDURE — 85025 COMPLETE CBC W/AUTO DIFF WBC: CPT | Performed by: PHYSICIAN ASSISTANT

## 2019-09-17 PROCEDURE — 25000125 ZZHC RX 250: Mod: ZF | Performed by: STUDENT IN AN ORGANIZED HEALTH CARE EDUCATION/TRAINING PROGRAM

## 2019-09-17 RX ORDER — PENTAMIDINE ISETHIONATE 300 MG/300MG
300 INHALANT RESPIRATORY (INHALATION)
COMMUNITY
End: 2019-09-23

## 2019-09-17 RX ORDER — ALBUTEROL SULFATE 5 MG/ML
2.5 SOLUTION RESPIRATORY (INHALATION)
Status: CANCELLED
Start: 2019-10-15

## 2019-09-17 RX ORDER — ALBUTEROL SULFATE 0.83 MG/ML
2.5 SOLUTION RESPIRATORY (INHALATION)
Status: DISCONTINUED | OUTPATIENT
Start: 2019-09-17 | End: 2019-09-17 | Stop reason: HOSPADM

## 2019-09-17 RX ORDER — PENTAMIDINE ISETHIONATE 300 MG/300MG
300 INHALANT RESPIRATORY (INHALATION)
Status: CANCELLED
Start: 2019-10-15

## 2019-09-17 RX ORDER — AZITHROMYCIN 250 MG/1
250 TABLET, FILM COATED ORAL DAILY
Qty: 30 TABLET | Refills: 1 | Status: ON HOLD | OUTPATIENT
Start: 2019-09-17 | End: 2019-09-29

## 2019-09-17 RX ORDER — HEPARIN SODIUM,PORCINE 10 UNIT/ML
5 VIAL (ML) INTRAVENOUS
Status: CANCELLED | OUTPATIENT
Start: 2019-10-15

## 2019-09-17 RX ADMIN — FILGRASTIM 480 MCG: 480 INJECTION, SOLUTION INTRAVENOUS; SUBCUTANEOUS at 08:55

## 2019-09-17 RX ADMIN — PENTAMIDINE ISETHIONATE 300 MG: 300 INJECTION, POWDER, LYOPHILIZED, FOR SOLUTION INTRAMUSCULAR; INTRAVENOUS at 08:35

## 2019-09-17 RX ADMIN — ALBUTEROL SULFATE 2.5 MG: 2.5 SOLUTION RESPIRATORY (INHALATION) at 08:24

## 2019-09-17 ASSESSMENT — PAIN SCALES - GENERAL: PAINLEVEL: NO PAIN (0)
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
## 2019-09-17 NOTE — NURSING NOTE
Chief Complaint   Patient presents with     Blood Draw     Labs drawn via VPT by RN in lab. VS taken. Pt checked in for next appt     Labs collected from venipuncture by RN. Vitals taken. Checked in for appointment(s).    Leslie MAXWELL RN PHN BSN  BMT/Oncology Lab

## 2019-09-17 NOTE — NURSING NOTE
"Oncology Rooming Note    September 17, 2019 7:58 AM   Angel Yanez is a 59 year old male who presents for:    Chief Complaint   Patient presents with     Blood Draw     Labs drawn via VPT by RN in lab. VS taken. Pt checked in for next appt     RECHECK     Return: Multiple Myeloma      Initial Vitals: /69 (BP Location: Right arm, Patient Position: Sitting, Cuff Size: Adult Regular)   Pulse 88   Temp 96.7  F (35.9  C) (Oral)   Resp 16   Wt 80.2 kg (176 lb 12.8 oz)   SpO2 98%   BMI 26.11 kg/m   Estimated body mass index is 26.11 kg/m  as calculated from the following:    Height as of 8/27/19: 1.753 m (5' 9\").    Weight as of this encounter: 80.2 kg (176 lb 12.8 oz). Body surface area is 1.98 meters squared.  No Pain (0) Comment: Data Unavailable   No LMP for male patient.  Allergies reviewed: Yes  Medications reviewed: Yes    Medications: Medication refills not needed today.  Pharmacy name entered into EPIC:    [facility] - [place] - [address]  [facility] - [place], [place] - [address] [facility]    Clinical concerns: None       Kathy Perez CMA              "

## 2019-09-17 NOTE — NURSING NOTE
Neupogen 480 MCG. Sub-Q injection right arm . See MAR. Pt. Tolerated well.    Kathy Perez, CMA

## 2019-09-23 ENCOUNTER — HOSPITAL ENCOUNTER (INPATIENT)
Facility: CLINIC | Age: 59
LOS: 5 days | Discharge: HOME | DRG: 865 | End: 2019-09-29
Attending: EMERGENCY MEDICINE | Admitting: INTERNAL MEDICINE
Payer: COMMERCIAL

## 2019-09-23 ENCOUNTER — APPOINTMENT (OUTPATIENT)
Dept: GENERAL RADIOLOGY | Facility: CLINIC | Age: 59
DRG: 865 | End: 2019-09-23
Attending: EMERGENCY MEDICINE
Payer: COMMERCIAL

## 2019-09-23 ENCOUNTER — TELEPHONE (OUTPATIENT)
Dept: TRANSPLANT | Facility: CLINIC | Age: 59
End: 2019-09-23

## 2019-09-23 DIAGNOSIS — C90.01 MULTIPLE MYELOMA IN REMISSION (H): ICD-10-CM

## 2019-09-23 DIAGNOSIS — R50.81 NEUTROPENIC FEVER (H): Primary | ICD-10-CM

## 2019-09-23 DIAGNOSIS — D70.9 NEUTROPENIC FEVER (H): Primary | ICD-10-CM

## 2019-09-23 LAB
ALBUMIN SERPL-MCNC: 3.9 G/DL (ref 3.4–5)
ALBUMIN UR-MCNC: NEGATIVE MG/DL
ALP SERPL-CCNC: 80 U/L (ref 40–150)
ALT SERPL W P-5'-P-CCNC: 18 U/L (ref 0–70)
ANION GAP SERPL CALCULATED.3IONS-SCNC: 9 MMOL/L (ref 3–14)
APPEARANCE UR: CLEAR
AST SERPL W P-5'-P-CCNC: 10 U/L (ref 0–45)
BASOPHILS # BLD AUTO: 0 10E9/L (ref 0–0.2)
BASOPHILS NFR BLD AUTO: 0.4 %
BILIRUB SERPL-MCNC: 0.6 MG/DL (ref 0.2–1.3)
BILIRUB UR QL STRIP: NEGATIVE
BUN SERPL-MCNC: 11 MG/DL (ref 7–30)
CALCIUM SERPL-MCNC: 9 MG/DL (ref 8.5–10.1)
CHLORIDE SERPL-SCNC: 103 MMOL/L (ref 94–109)
CO2 BLDCOV-SCNC: 29 MMOL/L (ref 21–28)
CO2 SERPL-SCNC: 25 MMOL/L (ref 20–32)
COLOR UR AUTO: YELLOW
CREAT SERPL-MCNC: 0.92 MG/DL (ref 0.66–1.25)
DIFFERENTIAL METHOD BLD: ABNORMAL
EOSINOPHIL # BLD AUTO: 0.2 10E9/L (ref 0–0.7)
EOSINOPHIL NFR BLD AUTO: 5.4 %
ERYTHROCYTE [DISTWIDTH] IN BLOOD BY AUTOMATED COUNT: 15.7 % (ref 10–15)
GFR SERPL CREATININE-BSD FRML MDRD: 89 ML/MIN/{1.73_M2}
GLUCOSE SERPL-MCNC: 117 MG/DL (ref 70–99)
GLUCOSE UR STRIP-MCNC: NEGATIVE MG/DL
HCT VFR BLD AUTO: 30.4 % (ref 40–53)
HGB BLD-MCNC: 9.8 G/DL (ref 13.3–17.7)
HGB UR QL STRIP: ABNORMAL
IMM GRANULOCYTES # BLD: 0 10E9/L (ref 0–0.4)
IMM GRANULOCYTES NFR BLD: 0.4 %
KETONES UR STRIP-MCNC: 10 MG/DL
L PNEUMO1 AG UR QL IA: NORMAL
LACTATE BLD-SCNC: 1.4 MMOL/L (ref 0.7–2.1)
LEUKOCYTE ESTERASE UR QL STRIP: NEGATIVE
LYMPHOCYTES # BLD AUTO: 0.6 10E9/L (ref 0.8–5.3)
LYMPHOCYTES NFR BLD AUTO: 20.9 %
MCH RBC QN AUTO: 37.4 PG (ref 26.5–33)
MCHC RBC AUTO-ENTMCNC: 32.2 G/DL (ref 31.5–36.5)
MCV RBC AUTO: 116 FL (ref 78–100)
MONOCYTES # BLD AUTO: 0.6 10E9/L (ref 0–1.3)
MONOCYTES NFR BLD AUTO: 19.8 %
MUCOUS THREADS #/AREA URNS LPF: PRESENT /LPF
NEUTROPHILS # BLD AUTO: 1.5 10E9/L (ref 1.6–8.3)
NEUTROPHILS NFR BLD AUTO: 53.1 %
NITRATE UR QL: NEGATIVE
NRBC # BLD AUTO: 0 10*3/UL
NRBC BLD AUTO-RTO: 0 /100
PCO2 BLDV: 44 MM HG (ref 40–50)
PH BLDV: 7.43 PH (ref 7.32–7.43)
PH UR STRIP: 6 PH (ref 5–7)
PLATELET # BLD AUTO: 18 10E9/L (ref 150–450)
PLATELET # BLD EST: ABNORMAL 10*3/UL
PO2 BLDV: 19 MM HG (ref 25–47)
POTASSIUM SERPL-SCNC: 3.8 MMOL/L (ref 3.4–5.3)
PROT SERPL-MCNC: 7 G/DL (ref 6.8–8.8)
RBC # BLD AUTO: 2.62 10E12/L (ref 4.4–5.9)
RBC #/AREA URNS AUTO: 0 /HPF (ref 0–2)
SAO2 % BLDV FROM PO2: 31 %
SODIUM SERPL-SCNC: 137 MMOL/L (ref 133–144)
SOURCE: ABNORMAL
SP GR UR STRIP: 1.02 (ref 1–1.03)
SPECIMEN SOURCE: NORMAL
UROBILINOGEN UR STRIP-MCNC: NORMAL MG/DL (ref 0–2)
WBC # BLD AUTO: 2.8 10E9/L (ref 4–11)
WBC #/AREA URNS AUTO: 1 /HPF (ref 0–5)

## 2019-09-23 PROCEDURE — 82803 BLOOD GASES ANY COMBINATION: CPT

## 2019-09-23 PROCEDURE — 81001 URINALYSIS AUTO W/SCOPE: CPT | Performed by: EMERGENCY MEDICINE

## 2019-09-23 PROCEDURE — 25800030 ZZH RX IP 258 OP 636: Performed by: EMERGENCY MEDICINE

## 2019-09-23 PROCEDURE — 85025 COMPLETE CBC W/AUTO DIFF WBC: CPT | Performed by: EMERGENCY MEDICINE

## 2019-09-23 PROCEDURE — 83605 ASSAY OF LACTIC ACID: CPT

## 2019-09-23 PROCEDURE — 25000132 ZZH RX MED GY IP 250 OP 250 PS 637: Performed by: EMERGENCY MEDICINE

## 2019-09-23 PROCEDURE — 96365 THER/PROPH/DIAG IV INF INIT: CPT | Performed by: EMERGENCY MEDICINE

## 2019-09-23 PROCEDURE — 71046 X-RAY EXAM CHEST 2 VIEWS: CPT

## 2019-09-23 PROCEDURE — 87899 AGENT NOS ASSAY W/OPTIC: CPT | Performed by: EMERGENCY MEDICINE

## 2019-09-23 PROCEDURE — 96361 HYDRATE IV INFUSION ADD-ON: CPT | Performed by: EMERGENCY MEDICINE

## 2019-09-23 PROCEDURE — 80053 COMPREHEN METABOLIC PANEL: CPT | Performed by: EMERGENCY MEDICINE

## 2019-09-23 PROCEDURE — 99285 EMERGENCY DEPT VISIT HI MDM: CPT | Mod: 25 | Performed by: EMERGENCY MEDICINE

## 2019-09-23 PROCEDURE — 87040 BLOOD CULTURE FOR BACTERIA: CPT | Performed by: EMERGENCY MEDICINE

## 2019-09-23 PROCEDURE — 99285 EMERGENCY DEPT VISIT HI MDM: CPT | Mod: Z6 | Performed by: EMERGENCY MEDICINE

## 2019-09-23 PROCEDURE — 25000128 H RX IP 250 OP 636: Performed by: EMERGENCY MEDICINE

## 2019-09-23 RX ORDER — ACETAMINOPHEN 500 MG
1000 TABLET ORAL ONCE
Status: COMPLETED | OUTPATIENT
Start: 2019-09-23 | End: 2019-09-23

## 2019-09-23 RX ORDER — PENTAMIDINE ISETHIONATE 300 MG/300MG
300 INHALANT RESPIRATORY (INHALATION)
Status: ON HOLD | COMMUNITY
End: 2019-09-29

## 2019-09-23 RX ADMIN — SODIUM CHLORIDE 1000 ML: 9 INJECTION, SOLUTION INTRAVENOUS at 17:58

## 2019-09-23 RX ADMIN — CEFEPIME HYDROCHLORIDE 2 G: 2 INJECTION, POWDER, FOR SOLUTION INTRAVENOUS at 17:58

## 2019-09-23 RX ADMIN — SODIUM CHLORIDE, POTASSIUM CHLORIDE, SODIUM LACTATE AND CALCIUM CHLORIDE 500 ML: 600; 310; 30; 20 INJECTION, SOLUTION INTRAVENOUS at 19:57

## 2019-09-23 RX ADMIN — SODIUM CHLORIDE, POTASSIUM CHLORIDE, SODIUM LACTATE AND CALCIUM CHLORIDE 1000 ML: 600; 310; 30; 20 INJECTION, SOLUTION INTRAVENOUS at 18:49

## 2019-09-23 RX ADMIN — ACETAMINOPHEN 1000 MG: 500 TABLET ORAL at 18:00

## 2019-09-23 ASSESSMENT — ENCOUNTER SYMPTOMS
BACK PAIN: 0
COUGH: 1
DYSURIA: 0
CONFUSION: 0
HEADACHES: 0
FEVER: 1
SORE THROAT: 0
ABDOMINAL PAIN: 0
SHORTNESS OF BREATH: 0

## 2019-09-23 ASSESSMENT — MIFFLIN-ST. JEOR: SCORE: 1592.35

## 2019-09-23 NOTE — ED PROVIDER NOTES
"  History     Chief Complaint   Patient presents with     Fever     Fatigue     HPI  Angel Yanez is a 59 year old male who presents with fever.  He has a history of bone marrow transplant for multiple myeloma earlier this year.  He has had a slow recovery of his blood counts.  He has had a cough for a week which resolved and then reoccurred over the last couple of days.  He went to work today and started to feel ill with aching, nausea, headaches.  He took his temperature at home and it was about 99.  He then took a nap and took his temperature again and it was 102 F.  He did not take anything for symptoms today.  He denies neck stiffness, shortness of breath, chest pain, vomiting, dysuria, rash.  He has had some intermittent skin itching over the past few weeks.  Sepsis protocol was initiated in triage.    I have reviewed the Medications, Allergies, Past Medical and Surgical History, and Social History in the Epic system.    Review of Systems   Constitutional: Positive for fever.   HENT: Negative for congestion and sore throat.    Eyes: Negative for visual disturbance.   Respiratory: Positive for cough. Negative for shortness of breath.    Cardiovascular: Negative for chest pain.   Gastrointestinal: Negative for abdominal pain.   Genitourinary: Negative for dysuria.   Musculoskeletal: Negative for back pain.   Skin: Negative for rash.   Neurological: Negative for headaches.   Psychiatric/Behavioral: Negative for confusion.       Physical Exam   BP: 136/78  Pulse: 111  Temp: 102.3  F (39.1  C)  Resp: 16  Height: 175.3 cm (5' 9\")  Weight: 79.7 kg (175 lb 11.2 oz)  SpO2: 97 %      Physical Exam  Constitutional:       Appearance: He is not toxic-appearing.   HENT:      Head: Normocephalic and atraumatic.      Mouth/Throat:      Mouth: Mucous membranes are moist.      Pharynx: No oropharyngeal exudate or posterior oropharyngeal erythema.   Eyes:      Pupils: Pupils are equal, round, and reactive to light.   Neck:    "   Musculoskeletal: Normal range of motion.   Cardiovascular:      Rate and Rhythm: Regular rhythm.      Comments: Tachycardic, no lower extremity edema  Pulmonary:      Effort: Pulmonary effort is normal.      Breath sounds: Normal breath sounds.   Abdominal:      Palpations: Abdomen is soft.      Tenderness: There is no tenderness.   Musculoskeletal:      Comments: Moving extremities symmetrically   Skin:     Findings: No rash.      Comments: Skin is hot, patient is mildly diaphoretic   Neurological:      Mental Status: He is alert and oriented to person, place, and time.         ED Course        Procedures             Critical Care time:  none             Labs Ordered and Resulted from Time of ED Arrival Up to the Time of Departure from the ED - No data to display         Assessments & Plan (with Medical Decision Making)   Mr Yanez is a 59-year-old man with history of a bone marrow transplant who presents with fever.  Sepsis protocol was initiated in triage including fluids and cefepime 2 g.  Neutrophil count was 600.  No source of infection was identified on work-up (chest x-ray clear, urinalysis normal).  Blood cultures are pending.  Heme/onc was consulted and admitted the patient for neutropenic fever.  He was never hypotensive.  Patient's vitals improved with fluids and treatment of his fever.  He remained hemodynamically stable in the emergency department.  Kirstin Ascencio MD on 9/23/2019 at 9:52 PM     I have reviewed the nursing notes.    I have reviewed the findings, diagnosis, plan and need for follow up with the patient.    New Prescriptions    No medications on file       Final diagnoses:   None       9/23/2019   [facility], [place], EMERGENCY DEPARTMENT     Kirstin Ascencio MD  09/23/19 [id number]

## 2019-09-23 NOTE — ED TRIAGE NOTES
Pt arrives to triage with complaints of fever. Pt reports fatigue and chills starting today. Hx BMT. Temp 102 at home. Pt reports cough for awhile. Denies any nausea, vomiting or diarrhea. A&O, temp 102.3 in triage, 's

## 2019-09-23 NOTE — TELEPHONE ENCOUNTER
Guille called to report a temp of 102 with chills & malaise.  He has a chronic cough, but otherwise no new localizing symptoms.  In reviewing his most recent labs he remains neutropenic & is still getting GCSF intermittantly.  In light of this I advised him to go to the ED.    Karla Manzo

## 2019-09-24 ENCOUNTER — APPOINTMENT (OUTPATIENT)
Dept: CT IMAGING | Facility: CLINIC | Age: 59
DRG: 865 | End: 2019-09-24
Attending: PHYSICIAN ASSISTANT
Payer: COMMERCIAL

## 2019-09-24 LAB
ABO + RH BLD: ABNORMAL
ABO + RH BLD: ABNORMAL
ANION GAP SERPL CALCULATED.3IONS-SCNC: 6 MMOL/L (ref 3–14)
APTT PPP: 42 SEC (ref 22–37)
BACTERIA SPEC CULT: NORMAL
BASOPHILS # BLD AUTO: 0 10E9/L (ref 0–0.2)
BASOPHILS NFR BLD AUTO: 0.4 %
BLD GP AB SCN SERPL QL: ABNORMAL
BLOOD BANK CMNT PATIENT-IMP: ABNORMAL
BLOOD BANK CMNT PATIENT-IMP: ABNORMAL
BUN SERPL-MCNC: 10 MG/DL (ref 7–30)
CALCIUM SERPL-MCNC: 8 MG/DL (ref 8.5–10.1)
CHLORIDE SERPL-SCNC: 105 MMOL/L (ref 94–109)
CO2 SERPL-SCNC: 27 MMOL/L (ref 20–32)
CREAT SERPL-MCNC: 0.85 MG/DL (ref 0.66–1.25)
DIFFERENTIAL METHOD BLD: ABNORMAL
EOSINOPHIL # BLD AUTO: 0.1 10E9/L (ref 0–0.7)
EOSINOPHIL NFR BLD AUTO: 3.1 %
ERYTHROCYTE [DISTWIDTH] IN BLOOD BY AUTOMATED COUNT: 15.5 % (ref 10–15)
GFR SERPL CREATININE-BSD FRML MDRD: >90 ML/MIN/{1.73_M2}
GLUCOSE SERPL-MCNC: 134 MG/DL (ref 70–99)
HCT VFR BLD AUTO: 24 % (ref 40–53)
HGB BLD-MCNC: 8.1 G/DL (ref 13.3–17.7)
IMM GRANULOCYTES # BLD: 0 10E9/L (ref 0–0.4)
IMM GRANULOCYTES NFR BLD: 0.8 %
INR PPP: 1.19 (ref 0.86–1.14)
LACTATE BLD-SCNC: 0.8 MMOL/L (ref 0.7–2)
LYMPHOCYTES # BLD AUTO: 0.5 10E9/L (ref 0.8–5.3)
LYMPHOCYTES NFR BLD AUTO: 17.3 %
MCH RBC QN AUTO: 38.8 PG (ref 26.5–33)
MCHC RBC AUTO-ENTMCNC: 33.8 G/DL (ref 31.5–36.5)
MCV RBC AUTO: 115 FL (ref 78–100)
MONOCYTES # BLD AUTO: 0.5 10E9/L (ref 0–1.3)
MONOCYTES NFR BLD AUTO: 18.8 %
NEUTROPHILS # BLD AUTO: 1.6 10E9/L (ref 1.6–8.3)
NEUTROPHILS NFR BLD AUTO: 59.6 %
NRBC # BLD AUTO: 0 10*3/UL
NRBC BLD AUTO-RTO: 0 /100
PLATELET # BLD AUTO: 16 10E9/L (ref 150–450)
PLATELET # BLD EST: ABNORMAL 10*3/UL
POTASSIUM SERPL-SCNC: 3.7 MMOL/L (ref 3.4–5.3)
PROCALCITONIN SERPL-MCNC: 0.27 NG/ML
RBC # BLD AUTO: 2.09 10E12/L (ref 4.4–5.9)
S PNEUM AG SPEC QL: NORMAL
SODIUM SERPL-SCNC: 138 MMOL/L (ref 133–144)
SPECIMEN EXP DATE BLD: ABNORMAL
SPECIMEN SOURCE: NORMAL
SPECIMEN SOURCE: NORMAL
WBC # BLD AUTO: 2.6 10E9/L (ref 4–11)

## 2019-09-24 PROCEDURE — 80048 BASIC METABOLIC PNL TOTAL CA: CPT | Performed by: INTERNAL MEDICINE

## 2019-09-24 PROCEDURE — 87103 BLOOD FUNGUS CULTURE: CPT | Performed by: INTERNAL MEDICINE

## 2019-09-24 PROCEDURE — 85610 PROTHROMBIN TIME: CPT | Performed by: INTERNAL MEDICINE

## 2019-09-24 PROCEDURE — 20600000 ZZH R&B BMT

## 2019-09-24 PROCEDURE — 99223 1ST HOSP IP/OBS HIGH 75: CPT | Mod: AI | Performed by: INTERNAL MEDICINE

## 2019-09-24 PROCEDURE — 87040 BLOOD CULTURE FOR BACTERIA: CPT | Performed by: INTERNAL MEDICINE

## 2019-09-24 PROCEDURE — 86850 RBC ANTIBODY SCREEN: CPT | Performed by: INTERNAL MEDICINE

## 2019-09-24 PROCEDURE — 87305 ASPERGILLUS AG IA: CPT | Performed by: PHYSICIAN ASSISTANT

## 2019-09-24 PROCEDURE — 36415 COLL VENOUS BLD VENIPUNCTURE: CPT | Performed by: INTERNAL MEDICINE

## 2019-09-24 PROCEDURE — 25000132 ZZH RX MED GY IP 250 OP 250 PS 637: Performed by: INTERNAL MEDICINE

## 2019-09-24 PROCEDURE — 36415 COLL VENOUS BLD VENIPUNCTURE: CPT | Performed by: PHYSICIAN ASSISTANT

## 2019-09-24 PROCEDURE — 25000128 H RX IP 250 OP 636: Performed by: INTERNAL MEDICINE

## 2019-09-24 PROCEDURE — 87081 CULTURE SCREEN ONLY: CPT | Performed by: INTERNAL MEDICINE

## 2019-09-24 PROCEDURE — 83605 ASSAY OF LACTIC ACID: CPT | Performed by: INTERNAL MEDICINE

## 2019-09-24 PROCEDURE — 85730 THROMBOPLASTIN TIME PARTIAL: CPT | Performed by: INTERNAL MEDICINE

## 2019-09-24 PROCEDURE — 84145 PROCALCITONIN (PCT): CPT | Performed by: INTERNAL MEDICINE

## 2019-09-24 PROCEDURE — 71250 CT THORAX DX C-: CPT

## 2019-09-24 PROCEDURE — 87899 AGENT NOS ASSAY W/OPTIC: CPT | Performed by: INTERNAL MEDICINE

## 2019-09-24 PROCEDURE — 87449 NOS EACH ORGANISM AG IA: CPT | Performed by: PHYSICIAN ASSISTANT

## 2019-09-24 PROCEDURE — 87633 RESP VIRUS 12-25 TARGETS: CPT | Performed by: INTERNAL MEDICINE

## 2019-09-24 PROCEDURE — 85025 COMPLETE CBC W/AUTO DIFF WBC: CPT | Performed by: INTERNAL MEDICINE

## 2019-09-24 PROCEDURE — 25800030 ZZH RX IP 258 OP 636: Performed by: INTERNAL MEDICINE

## 2019-09-24 PROCEDURE — 86901 BLOOD TYPING SEROLOGIC RH(D): CPT | Performed by: INTERNAL MEDICINE

## 2019-09-24 PROCEDURE — 86900 BLOOD TYPING SEROLOGIC ABO: CPT | Performed by: INTERNAL MEDICINE

## 2019-09-24 RX ORDER — ACETAMINOPHEN 325 MG/1
325-650 TABLET ORAL EVERY 4 HOURS PRN
Status: DISCONTINUED | OUTPATIENT
Start: 2019-09-24 | End: 2019-09-29 | Stop reason: HOSPADM

## 2019-09-24 RX ORDER — PROCHLORPERAZINE MALEATE 5 MG
5-10 TABLET ORAL EVERY 6 HOURS PRN
Status: DISCONTINUED | OUTPATIENT
Start: 2019-09-24 | End: 2019-09-29 | Stop reason: HOSPADM

## 2019-09-24 RX ORDER — ACETAMINOPHEN 325 MG/1
325 TABLET ORAL ONCE
Status: COMPLETED | OUTPATIENT
Start: 2019-09-24 | End: 2019-09-24

## 2019-09-24 RX ORDER — ACETAMINOPHEN 325 MG/1
325-650 TABLET ORAL EVERY 4 HOURS PRN
Status: DISCONTINUED | OUTPATIENT
Start: 2019-09-24 | End: 2019-09-24

## 2019-09-24 RX ORDER — PAROXETINE 20 MG/1
20 TABLET, FILM COATED ORAL EVERY MORNING
Status: DISCONTINUED | OUTPATIENT
Start: 2019-09-24 | End: 2019-09-29 | Stop reason: HOSPADM

## 2019-09-24 RX ORDER — MAGNESIUM SULFATE HEPTAHYDRATE 40 MG/ML
4 INJECTION, SOLUTION INTRAVENOUS EVERY 4 HOURS PRN
Status: DISCONTINUED | OUTPATIENT
Start: 2019-09-24 | End: 2019-09-29 | Stop reason: HOSPADM

## 2019-09-24 RX ORDER — POTASSIUM CL/LIDO/0.9 % NACL 10MEQ/0.1L
10 INTRAVENOUS SOLUTION, PIGGYBACK (ML) INTRAVENOUS
Status: DISCONTINUED | OUTPATIENT
Start: 2019-09-24 | End: 2019-09-29 | Stop reason: HOSPADM

## 2019-09-24 RX ORDER — SODIUM CHLORIDE 9 MG/ML
INJECTION, SOLUTION INTRAVENOUS CONTINUOUS
Status: DISCONTINUED | OUTPATIENT
Start: 2019-09-24 | End: 2019-09-26

## 2019-09-24 RX ORDER — DOCUSATE SODIUM 100 MG/1
100 CAPSULE, LIQUID FILLED ORAL 2 TIMES DAILY PRN
Status: DISCONTINUED | OUTPATIENT
Start: 2019-09-24 | End: 2019-09-29 | Stop reason: HOSPADM

## 2019-09-24 RX ORDER — POTASSIUM CHLORIDE 29.8 MG/ML
20 INJECTION INTRAVENOUS
Status: DISCONTINUED | OUTPATIENT
Start: 2019-09-24 | End: 2019-09-29 | Stop reason: HOSPADM

## 2019-09-24 RX ORDER — POTASSIUM CHLORIDE 7.45 MG/ML
10 INJECTION INTRAVENOUS
Status: DISCONTINUED | OUTPATIENT
Start: 2019-09-24 | End: 2019-09-29 | Stop reason: HOSPADM

## 2019-09-24 RX ORDER — POTASSIUM CHLORIDE 1.5 G/1.58G
20-40 POWDER, FOR SOLUTION ORAL
Status: DISCONTINUED | OUTPATIENT
Start: 2019-09-24 | End: 2019-09-29 | Stop reason: HOSPADM

## 2019-09-24 RX ORDER — POTASSIUM CHLORIDE 750 MG/1
20-40 TABLET, EXTENDED RELEASE ORAL
Status: DISCONTINUED | OUTPATIENT
Start: 2019-09-24 | End: 2019-09-29 | Stop reason: HOSPADM

## 2019-09-24 RX ORDER — AZITHROMYCIN 250 MG/1
500 TABLET, FILM COATED ORAL DAILY
Status: DISCONTINUED | OUTPATIENT
Start: 2019-09-24 | End: 2019-09-26

## 2019-09-24 RX ORDER — ACYCLOVIR 800 MG/1
800 TABLET ORAL 2 TIMES DAILY
Status: DISCONTINUED | OUTPATIENT
Start: 2019-09-24 | End: 2019-09-29 | Stop reason: HOSPADM

## 2019-09-24 RX ADMIN — ACETAMINOPHEN 650 MG: 325 TABLET, FILM COATED ORAL at 16:27

## 2019-09-24 RX ADMIN — MELATONIN 2000 UNITS: at 08:03

## 2019-09-24 RX ADMIN — SODIUM CHLORIDE, PRESERVATIVE FREE: 5 INJECTION INTRAVENOUS at 01:36

## 2019-09-24 RX ADMIN — AZITHROMYCIN 500 MG: 250 TABLET, FILM COATED ORAL at 08:03

## 2019-09-24 RX ADMIN — CEFEPIME HYDROCHLORIDE 2 G: 2 INJECTION, POWDER, FOR SOLUTION INTRAVENOUS at 20:31

## 2019-09-24 RX ADMIN — ACETAMINOPHEN 650 MG: 325 TABLET, FILM COATED ORAL at 12:35

## 2019-09-24 RX ADMIN — Medication 600 MG: at 08:03

## 2019-09-24 RX ADMIN — CEFEPIME HYDROCHLORIDE 2 G: 2 INJECTION, POWDER, FOR SOLUTION INTRAVENOUS at 12:21

## 2019-09-24 RX ADMIN — ACETAMINOPHEN 650 MG: 325 TABLET, FILM COATED ORAL at 01:35

## 2019-09-24 RX ADMIN — CEFEPIME HYDROCHLORIDE 2 G: 2 INJECTION, POWDER, FOR SOLUTION INTRAVENOUS at 05:27

## 2019-09-24 RX ADMIN — ACETAMINOPHEN 650 MG: 325 TABLET, FILM COATED ORAL at 20:31

## 2019-09-24 RX ADMIN — ACETAMINOPHEN 325 MG: 325 TABLET, FILM COATED ORAL at 16:37

## 2019-09-24 RX ADMIN — Medication 600 MG: at 18:10

## 2019-09-24 RX ADMIN — SODIUM CHLORIDE, PRESERVATIVE FREE: 5 INJECTION INTRAVENOUS at 16:39

## 2019-09-24 RX ADMIN — ACYCLOVIR 800 MG: 800 TABLET ORAL at 08:03

## 2019-09-24 RX ADMIN — PAROXETINE HYDROCHLORIDE HEMIHYDRATE 20 MG: 20 TABLET, FILM COATED ORAL at 08:03

## 2019-09-24 RX ADMIN — ACETAMINOPHEN 650 MG: 325 TABLET, FILM COATED ORAL at 05:39

## 2019-09-24 RX ADMIN — ACYCLOVIR 800 MG: 800 TABLET ORAL at 20:35

## 2019-09-24 ASSESSMENT — MIFFLIN-ST. JEOR: SCORE: 1615.93

## 2019-09-24 ASSESSMENT — ACTIVITIES OF DAILY LIVING (ADL)
ADLS_ACUITY_SCORE: 10

## 2019-09-24 NOTE — PROGRESS NOTES
"BMT Daily Progress Note      Patient ID: Angel Yanez is a 58 yo man Day +130 s/p 2nd auto for IgM kappa MM      Diagnosis MM Multiple myeloma  HCT Type Autologous    Prep Regimen Cytoxan  Melphalan   Donor Source Self     No  Primary BMT Provider Dr Lucio         INTERVAL  HISTORY     Admitted through ED yesterday afternoon for new fever in setting of intermittent neutropenia (poor cell dose with 2nd auto)> He has been off fungal prophy since 8/27. He really denies any localizing symptoms of infection. He has had a long standing intermittent cough.      Review of Systems: 10 point ROS negative except as noted above.     Physical Exam  Blood pressure 107/70, pulse 92, temperature 101.1  F (38.4  C), temperature source Skin, resp. rate 18, height 1.798 m (5' 10.8\"), weight 79.2 kg (174 lb 9.6 oz), SpO2 95 %.    Wt Readings from Last 4 Encounters:   09/24/19 79.2 kg (174 lb 9.6 oz)   09/17/19 80.2 kg (176 lb 12.8 oz)   09/10/19 79.9 kg (176 lb 1.6 oz)   09/03/19 79.2 kg (174 lb 9.6 oz)     General: NAD, pleasant  Eyes:  sclera anicteric and not injected  Nose/Mouth/Throat: OP moist, no ulcerations   Lungs: CTA bilaterally   Cardiovascular: RRR, no M/R/G   Lymphatics: no edema  Skin: no rash or petechiae   Neuro: non-focal, no gross deficits  No access.     Labs  Lab Results   Component Value Date    WBC 2.6 (L) 09/24/2019    ANEU 1.6 09/24/2019    HGB 8.1 (L) 09/24/2019    HCT 24.0 (L) 09/24/2019    PLT 16 (LL) 09/24/2019     09/24/2019    POTASSIUM 3.7 09/24/2019    CHLORIDE 105 09/24/2019    CO2 27 09/24/2019     (H) 09/24/2019    BUN 10 09/24/2019    CR 0.85 09/24/2019    MAG 2.2 08/23/2019    INR 1.19 (H) 09/24/2019    BILITOTAL 0.6 09/23/2019    AST 10 09/23/2019    ALT 18 09/23/2019    ALKPHOS 80 09/23/2019    PROTTOTAL 7.0 09/23/2019    ALBUMIN 3.9 09/23/2019     Assessment and Plan  Assessment and Plan  Angel Yanez is a 58 yo man Day +129 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 "  BMT/MM:   Slow engraftment; cell dose was only 0.639x10^6. ([place] - known poor cell dose as failed chemo-mobilization 1/2019.)   - Stringent CR.     2.  HEME: Keep Hgb>8g/dL, plt >10.   - Ongoing cytopenias - low cell dose with transplant. Pt was given GCSF 9/10 and again 9/17 with ANC = 0.6.COunts improved today- infection?    - Plts stable w/o transfusions.   - RBC antibody work up was completed 5/26. It was confirmed with BB that patient has no clinically significant antibodies.     3.  ID: Fevers in setting of intermittent neutropenia   - Fevers in the setting of intermittent neutropenia: No definite localizing source. Pt has a chronic mild cough which slightly worsened slightly with fever.   - 9/23 cxr: clear   - Continue cefepime/azithro  - f/u blood cultures.  UA unremarkable, and urine streptococcal and legionella antigens-negative.  - RVP Pending.   - Monitor fever curve.  - Pt has recent hx pseudomonas bacteremia 7/21/19 (BC+ from both Hsieh and periphery). He was initially treated with tobramycin daily through 7/23 which was switched to cefepime 7/24 based on sensitivities. Hsieh was removed on 7/23. He had a left arm PICC inserted 7/25 through which he completed 10 days IV cefepime daily at home through 8/2. PICC was then removed on 8/2. Pt also has hx of sinusitis (CT sinus 5/30/19 and 7/21/19).   - Continue prophylactic ACV. Prophylactic pentamidine was last given 9/17.  - On CMV vaccine trial.      4. GI:  No complaints.  - GI prophylaxis: omeprazole.       5.  FEN/Renal: Creat, lytes wnl. Eating well.   -  ml/hr.   - PRN lyte replacement per standing protocol.     6.  Mood: Continue Paxil.    Dispo: monitor fevers. Given prolonged neutropenia may need chest CT for possible fungal/atypical pna. Also awaiting blood cultures given recent pseudomonas blood stream infection    ATTENDING ADDENDUM:    I have independently seen and evaluated the patient on September 24, 2019 and reviewed  clinical, laboratory, and radiographic findings. I have discussed the plan with the team and agree with the attached note with the following edits:    Angel Yanez is a 59 year old year old male, with MM s/p auto #2, 4m out, recent pseudomonas BSI, here for fever while not neutropenic    Ph/E: Vitals reviewed. No distress. Oropharynx clear. Neck supple. Heart RRR. Lungs clear. Abdomen soft. No peripheral edema. No rash. Neuro nonfocal.     A&P: Cefe and await blood Cx. Can stop azithro tomorrow as there are no resp symptoms or signs.       acyclovir  800 mg Oral BID     azithromycin  500 mg Oral Daily     calcium carbonate  600 mg Oral BID w/meals     ceFEPIme (MAXIPIME) IV  2 g Intravenous Q8H     PARoxetine  20 mg Oral QAM     vitamin D3  2,000 Units Oral Daily       Kody Mukherjee MD

## 2019-09-24 NOTE — PHARMACY
Admission medication history interview status for the 9/23/2019 admission is complete. See Epic admission navigator for allergy information, pharmacy, prior to admission medications and immunization status.     Medication history interview sources: Patient, Surescripts history, and Care Everywhere    Changes made to PTA medication list (reason)  Added/Deleted/Changed: N/A    Additional medication history information (including reliability of information, actions taken by pharmacist):Patient was a good historian regarding his medications. He confirmed that he is no longer on Revlimid/Dexamethasone, stated its been roughly a year since he last took it.       Prior to Admission medications    Medication Sig Last Dose Taking? Auth Provider   acyclovir (ZOVIRAX) 800 MG tablet Take 1 tablet (800 mg) by mouth 2 times daily 9/23/2019 at am Yes Latrice Pride PA-C   azithromycin (ZITHROMAX) 250 MG tablet Take 1 tablet (250 mg) by mouth daily 9/23/2019 at am Yes Alejandra Adorno PA-C   calcium carbonate (CALCIUM CARBONATE) 600 MG tablet Take 2 tablets by mouth daily  9/23/2019 at am Yes Reported, Patient   Cholecalciferol (VITAMIN D3) 2000 units CAPS Take 2,000 Units by mouth daily  9/23/2019 at am Yes Reported, Patient   PARoxetine (PAXIL) 20 MG tablet Take 20 mg by mouth every morning 9/23/2019 at am Yes Reported, Patient   pentamidine (NEBUPENT) 300 MG neb solution Inhale 300 mg into the lungs every 28 days 9/17/2019 at Unknown time  Unknown, Entered By History         Medication history completed by:  Mariama Parikh, TeodoroD

## 2019-09-24 NOTE — PLAN OF CARE
S- Admit 9/23 for fevers and persistent cough.   B- MM s/p 2nd auto transplant day +130. Hx of auto transplant in 2005.   A- Tylenol given x 2 and ice packs applied today for significant fevers (103.4 and 103.7) Blood cultures drawn and МАРИНА notified. CT chest ordered and fungal labs ordered and done per МАРИНА. Patient was febrile most of the day, with another spike after first dose of tylenol today, moonlighter notified, additional dose of 325mg ordered and given, and finally came down to 99.9 at 6pm after 975mg tylenol dose. Patient has had a slight headache and chills with fevers. All other vss on RA. Patient up independently. VRE swab / Resp swab and pending. IV fluids and antibiotics infusing via PIV in left arm. Denies nausea / vomiting this shift. Ate late breakfast and ordered dinner. Lungs CTA. Dry, non productive, cough continues.   R- Continue with POC.         Problem: Adult Inpatient Plan of Care  Goal: Plan of Care Review  9/24/2019 1818 by Liz Hawkins RN  Outcome: No Change     Problem: Adult Inpatient Plan of Care  Goal: Patient-Specific Goal (Individualization)  Outcome: No Change     Problem: Adult Inpatient Plan of Care  Goal: Absence of Hospital-Acquired Illness or Injury  Outcome: No Change     Problem: Adult Inpatient Plan of Care  Goal: Optimal Comfort and Wellbeing  Outcome: No Change     Problem: Adult Inpatient Plan of Care  Goal: Readiness for Transition of Care  Outcome: No Change

## 2019-09-24 NOTE — PLAN OF CARE
[id number]:  Pt admitted from ED at 2300 - look at unit admission. Fever last night, 102.2 and 100.8. Tylenol given both times, MD aware. Fever seems to come after dry, frequent coughing spells and nausea. One emesis last night with coughing. Independent in room. No replacements this A. Continue POC.     Problem: Adult Inpatient Plan of Care  Goal: Plan of Care Review  Outcome: No Change     Problem: Fever (Fever with Neutropenia)  Goal: Baseline Body Temperature  Outcome: No Change     Problem: Infection Risk (Fever with Neutropenia)  Goal: Absence of Infection  Outcome: No Change

## 2019-09-24 NOTE — PROGRESS NOTES
Patient admitted to: 5C, room 5459   Admitted from: Home to ER  Arrived by: Car, wife   Reason for admission: Fever  Patient accompanied by: Wife  Belongings: Bag, clothes, wallet, phone  Teaching: Unit routine

## 2019-09-24 NOTE — PROGRESS NOTES
Psychosocial Assessment completed in the BMT Clinic and copied here for staff review.     Blood and Marrow Transplant   Psychosocial Assessment with   Clinical      REPEAT Assessment completed on 5/6/2019 of living situation, support system, financial status, functional status, coping, stressors, need for resources and social work intervention provided as needed. Information for this assessment was provided by pt's report in addition to medical chart review and consultation with medical team.      Present at assessment:   Patient: Angel Yanez  : ZUHAIR Woodard, JANELL     Diagnosis: Multiple Myeloma     Transplant type: Autologous     Donor: Autologous      Physician: Adebayo Lucio MD     Nurse Coordinator: Su Pate RN     Permanent Address:   [address] [place] [id number]     Contact Information:  Pt's Home Phone: [phone]  Pt's Cell Phone: [phone]  Pt Email: [email or web address]  Wife-Jennifer Yanez's Phone: [phone]  Son-Dalton Yanez's Phone: [phone]     Presenting Information:  Zaheer is a 58 year old male diagnosed with multiple myeloma who presents for evaluation for autologous transplant at the [facility] ([facility]). This will be pt's 2nd autologous transplant at the [facility].     Decision Making: Self     Health Care Directive: Yes. Pt has completed the healthcare directive completed and on file.      Relationship Status: . Pt described relationship as stable and supportive.      Special Needs: None identified at this time.      Family/Support System: Pt endorsed a good support system including family and close friends who will be available to support pt throughout transplant process.      Spouse: Jennifer Yanez  Children: 3 Children; Son-Alivia Yanez (28-Lives in [place], [place]), Son-Magdiel Yanez (Lives in [place], [place]) and Daughter-Jerry Yanez (Lives in [place])  Grandchildren: 1 Grandson-Alivia Tyler (1.5 years  "old)  Parents:   Siblings: 1 Sister-Sofie Calvin (Lives in [place], [place])     Caregiver: JANELL discussed with pt the caregiver role and expectation at length. During last PSA on 1/15/2019, pt said he won t have a caregiver 24/7 but will tell me he does if that is required and pt said SW can put his sonLucille down as well  if Social Work needed another name on the caregiver contract . During the PSA today (2019), pt said \"oh yea I will have a caregiver\". Pt's wife runs a small  out of the home, so she is there during the day. SW reminded pt of the last conversation and SW repeatedly reminded pt he is required to have a caregiver 24/7 for 30 days. Pt signed the caregiver contract which will be scanned into the EMR. Pt said his wife-Jennifer and son-Dalton will be the caregivers. Caregiver education and resources provided. EULALIO Delgadillo, and Andrew are aware. Dr. Lucio said he would reinforce the caregiver requirement during the close appointment.       Caregiver Contact Information:  Wife-Jennifer Yanez's Phone: [phone]  SonKiera Yanez's Phone: [phone]     Transportation Mode: Private Vehicle. During last PSA on 1/15/2019, pt said he will be driving himself to clinic post-transplant. JANELL thoroughly explained to pt that he cannot drive post-transplant until approved by a Physician. EULALIO Delgadillo, and Andrew were notified. Dr. Lucio said he would reinforce the \"no driving\" during the close appointment as well.      Insurance: Pt has [employer] health insurance; pt has an . Pt denied specific insurance concerns at this time. JANELL reiterated information about the [employer]  should specific insurance questions arise as pt moves through transplant process. Future Insurance questions referred to [employer] -Jessica Downs (P: [phone]).      Sources of Income: Pt's income, pt went back to work, and his " "wife's income. Once pt completely stops working, pt will re-file for short term disability. Pt also has long term disability available. Pt denied anticipation of financial hardship related to BMT at this time. SW provided information on gay options and encouraged pt to contact this SW for support should financial situation change.      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 Pt's wife-Jennifer owns her own  at home.      Mental Health: Pt denied a history of mental health concerns, specific diagnoses or medications at this time. SW explained that it's not uncommon for patients going through transplant to experience symptoms of depression/anxiety.      PHQ-9:  Pt scored a 4 which indicates no sign of depression on the depression severity scale. Pt does not endorse feelings  of depression at this time.      GAD7:  Pt scored a 3 which indicates no sign of anxiety on the Generalized Anxiety Disorder Questionnaire. Pt endorses this is an accurate reflection of his emotional state.     Chemical Use: Pt reported that he quit smoking 35 years ago. Pt reported minimal alcohol consumption; 2 beers in the past 3 months. Pt reported no hx of marijuana or other drugs. Based on the information provided, there appear to be no specific risks or concerns identified at this time.      Trauma/Loss/Abuse History: Multiple losses associated with cancer diagnosis and treatment, including health, employment, changes to physical appearance, etc.      Spirituality: Pt would not like a blessing ceremony while inpatient. SW explained that there are Chaplains on the unit and pt can request to meet with a  at anytime.     Coping: Pt noted that he is currently feeling \"frustrated and ready to begin\". Pt shared that his main coping mechanisms are exercise (riding bike). SW and pt discussed additional positive coping mechanisms that pt can utilize while in the hospital. While hospitalized, pt plans to walk the " hallways and watch tv.      Education Provided: Transplant process expectations, Caregiver requirements, Caregiver self-care, Financial issues related to transplant, Financial resources/grants available, Common psychosocial stressors pre/post transplant, Support group(s) available, Hospital resources available, Social Work role and Resources for grandchild.     Interventions Provided: Psychosocial Support and Education      Assessment and Recommendations for Team:  Pt is a 58 year old male diagnosed with multiple myeloma who is here undergoing preparation for a planned autologous transplant. Pt appeared to be in good spirits during conversation. Pt is pleasant and able to articulate concerns/coping mechanisms in an appropriate manner. Pt feels comfortable communicating with the medical team. Pt has a good supportive network of family and friends who are involved. Pt has developed a few coping mechanisms such as exercising and working on his hobbies. Pt may benefit from assistance in developing coping mechanisms. Pt and pt's family will benefit from ongoing psychosocial support in regards to coping with the adjustment to the BMT process.      Pt has a good support system. Please see caregiver section for update. Pt verbalizes understanding of the transplant process and wanting to proceed. SW provided contact information and encouraged pt to contact SW with questions, concerns, resources and for support.     Per this assessment, SW did not identify any barriers to this patient moving forward with transplant.     Important Information:   -See caregiver section. Check in with pt while inpatient to re-confirm plan.  -Pt said he plans to drive to clinic appointments post-transplant. Dr. Lucio notified and Dr. Lucio was going to tell pt he cannot drive.     Follow up Planned:   Psychosocial support

## 2019-09-24 NOTE — PHARMACY-ADMISSION MEDICATION HISTORY
Admission Medication History status for the 2019 admission is complete.  See EPIC admission navigator for Prior to Admission medications.  Patient's Name: Angel Yanez  Patient's : 1960   59 year old, male    Medication History Sources: Patient , Chart Review   Primary Pharmacy:  [facility] - [place], [place] - [address]    Medication history source reliability: Good, patient is a good historian.   Medication adherence:  Good, patient was very aware of when he takes his medications and what medications he is taking.     Changes made to PTA medication list (reason)  Added: None  Deleted: None  Changed: None    High Risk Medications (Warfarin, Immunosuppressants, Insulin, Antibiotics, or Other)    Yes: Azithromycin 250 mg daily - Antibiotic     Continuous Patch/Pump Device    No    Notable Drug-Drug Interactions:     There were no significant drug interactions identified upon review of medication list.    Additional medication history information (including actions taken by pharmacist): None    Time spent in this activity: 30 minutes    Medication history completed by: Beba Bauer 2nd Year Pharmacy Student     Prior to Admission medications    Medication Sig Last Dose Taking? Auth Provider   acyclovir (ZOVIRAX) 800 MG tablet Take 1 tablet (800 mg) by mouth 2 times daily 2019 at am Yes Latrice Pride PA-C   azithromycin (ZITHROMAX) 250 MG tablet Take 1 tablet (250 mg) by mouth daily 2019 at am Yes Alejandra Adorno PA-C   calcium carbonate (CALCIUM CARBONATE) 600 MG tablet Take 2 tablets by mouth daily  2019 at am Yes Reported, Patient   Cholecalciferol (VITAMIN D3) 2000 units CAPS Take 2,000 Units by mouth daily  2019 at am Yes Reported, Patient   PARoxetine (PAXIL) 20 MG tablet Take 20 mg by mouth every morning 2019 at am Yes Reported, Patient   pentamidine (NEBUPENT) 300 MG neb solution Inhale 300 mg into the lungs every 28 days 2019 at  Unknown time  Unknown, Entered By History

## 2019-09-24 NOTE — H&P
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  BMT/MM:   Slow engraftment; cell dose was only 0.639x10^6. ([facility] [place] - known poor cell dose as failed chemo-mobilization 1/2019.)   - Stringent CR.     2.  HEME: Keep Hgb>8g/dL, plt >10.   - Ongoing cytopenias - low cell dose with transplant. Pt was given GCSF 9/10 and again 9/17 with ANC = 0.6. Consider repeating GCSF with drop in ANC.   - Plts stable w/o transfusions.   - RBC antibody work up was completed 5/26. It was confirmed with BB that patient has no clinically significant antibodies.     3.  ID:  - Fevers in the setting of intermittent neutropenia: No definite localizing source. Pt has a chronic mild cough which slightly worsened on the day of admission but no associated sputum or sinus symptoms.   - Continue cefepime. Switched prophylactic dose azithromycin (last resumed 9/10 given intermittent neutropenia requiring GCSF) to treatment dose azithromycin x 5 days (discussed with Dr. Mukherjee).   - CXR did not demonstrate an acute focal opacity. Follow BCx, UA/UCx, and urine streptococcal and legionella antigens. Procalcitonin 0.27.   - Ordered RVP. Hx of rhinovirus+ on 7/22/19 and parainfluenza virus 3+ on 5/30/19.    - Monitor fever curve.  - Pt has recent hx pseudomonas bacteremia 7/21/19 (BC+ from both Hsieh and periphery). He was initially treated with tobramycin daily through 7/23 which was switched to cefepime 7/24 based on sensitivities. Hsieh was removed on 7/23. He had a left arm PICC inserted 7/25 through which he completed 10 days IV cefepime daily at home through 8/2. PICC was then removed on 8/2. Pt also has hx of sinusitis (CT sinus 5/30/19 and 7/21/19).   - Continue  "prophylactic ACV. Prophylactic pentamidine was last given 9/17.  - On CMV vaccine trial.      4. GI:  No complaints.  - GI prophylaxis: omeprazole.       5.  FEN/Renal: Creat, lytes wnl. Eating well.   -  ml/hr.   - PRN lyte replacement per standing protocol.     6.  Mood: Continue Paxil.       Rudolph Hernandez  09/23/2019              Chief Complaint:   Fevers in the setting of intermittent neutropenia         History of Present Illness:   Angel Yanez is a 60 yo man Day +129 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    He was in his usual state of health till last night. This AM when he woke up, he felt little \"run down\". He went to work but was back home by 10:30AM. He measured his temp and it was 99.5. He then went to sleep and, when he woke up, he again checked his temp at 1600 which had increased to 102.3. He called BMT triage line and was advised to come in.     Besides the fever, he actually feels pretty good. He reports a chronic mild cough on/off since BMT. He relates that the cough was slightly worse today but with no sputum or sinus symptoms. He had a minor cold about 1-1.5 weeks ago which consisted of some maxillary sinus pressure but no temperature or worsened cough and ultimately resolved completely. The cold happened immediately after patient had returned back from a trip to [place]. He describes mild generalized itchiness x 1 week since about he got his last dose of pentamidine but no rash or furuncles/boils. He currently has no central lines. He did a 17K mile bike run yesterday and 30K mile run last weekend.            Review of Systems:     14-point review of systems was otherwise negative except as noted in HPI.          Past Medical History:   Past medical history was reviewed.   Past Medical History:   Diagnosis Date     GERD (gastroesophageal reflux disease)      H/O autologous stem cell transplant (H) 02/2005     Hyperlipidemia      Multiple myeloma (H) 2004     PONV (postoperative nausea and " "vomiting)      Psoriasis      Psoriatic arthritis (H)              Past Surgical History:   Past surgical history was reviewed.   Past Surgical History:   Procedure Laterality Date     ARTHROPLASTY HIP       COLONOSCOPY       HERNIA REPAIR       IR CVC TUNNEL PLACEMENT > 5 YRS OF AGE  1/22/2019     IR CVC TUNNEL PLACEMENT > 5 YRS OF AGE  5/16/2019     IR CVC TUNNEL REMOVAL LEFT  2/20/2019     IR CVC TUNNEL REMOVAL RIGHT  7/23/2019     IR FOLLOW UP VISIT OUTPATIENT  1/24/2019     ORTHOPEDIC SURGERY       PROCURE BONE MARROW N/A 5/14/2019    Procedure: Bone Marrow [place];  Surgeon: Antwan Erickson MD;  Location: [facility] OR     TRANSPLANT               Social History:   Social history was reviewed.   Social History     Tobacco Use     Smoking status: Former Smoker     Smokeless tobacco: Never Used   Substance Use Topics     Alcohol use: Yes     Comment: occasionaly             Family History:   Family history was reviewed.  Family History   Problem Relation Age of Onset     Diabetes Mother              Allergies:     Allergies   Allergen Reactions     Chlorhexidine Itching     Avalide Hives     Chloroxylenol Rash     Technicare solution     Lorazepam Hives     Other reaction(s): Hives       Moxifloxacin Hives             Medications:   Medications Reviewed.   Current Facility-Administered Medications   Medication     [START ON 9/24/2019] ceFEPIme (MAXIPIME) 2 g vial to attach to  ml bag for ADULTS or 50 ml bag for PEDS             Physical Exam:   Vitals were reviewed.  Blood pressure 126/73, pulse 86, temperature 102.2  F (39  C), temperature source Oral, resp. rate 20, height 1.753 m (5' 9\"), weight 79.7 kg (175 lb 11.2 oz), SpO2 97 %.    Constitutional: awake, laying in bed, appears comfortable, in NAD  HEENT: MMM, EOM intact, sclerae clear and anicteric  Heart: RRR, no murmurs appreciated  Resp: Clear to auscultation bilaterally, breathing comfortably on RA, no wheezes, rhonchi  Abd: Soft, non-tender, " BS+, no masses appreciated  MSK:  Warm, FROM, no joint swelling  Skin: no rash or lesions on limited exam  Neuro: CN2-12 grossly intact, no lateralizing symptoms or focal neurologic deficits  Psych: Mood and affect WNL         Data:   No intake/output data recorded.    ROUTINE LABS (Last four results)  CMP  Recent Labs   Lab 09/23/19 1737 09/17/19  0744    141   POTASSIUM 3.8 4.1   CHLORIDE 103 110*   CO2 25 26   ANIONGAP 9 5   * 84   BUN 11 16   CR 0.92 0.81   GFRESTIMATED 89 >90   GFRESTBLACK >90 >90   ZHEN 9.0 8.9   PROTTOTAL 7.0  --    ALBUMIN 3.9  --    BILITOTAL 0.6  --    ALKPHOS 80  --    AST 10  --    ALT 18  --      CBC  Recent Labs   Lab 09/23/19 1737 09/17/19  0744   WBC 2.8* 2.2*   RBC 2.62* 2.53*   HGB 9.8* 9.7*   HCT 30.4* 28.9*   * 114*   MCH 37.4* 38.3*   MCHC 32.2 33.6   RDW 15.7* 16.0*   PLT 18* 22*     INRNo lab results found in last 7 days.  Arterial Blood GasNo lab results found in last 7 days.

## 2019-09-24 NOTE — ED NOTES
[facility], [place]   ED Nurse to Floor Handoff    Angel Yaenz is a 59 year old English-speaking male arrived from home with his wife with complaints of fever & malaise.    ED Chief Complaint: Fever and Fatigue    ED Dx;   Final diagnoses:   Neutropenic fever (H)         Needed?: No    Allergies:   Allergies   Allergen Reactions     Chlorhexidine Itching     Avalide Hives     Chloroxylenol Rash     Technicare solution     Lorazepam Hives     Other reaction(s): Hives       Moxifloxacin Hives   .  Past Medical Hx:   Past Medical History:   Diagnosis Date     GERD (gastroesophageal reflux disease)      H/O autologous stem cell transplant (H) 02/2005     Hyperlipidemia      Multiple myeloma (H) 2004     PONV (postoperative nausea and vomiting)      Psoriasis      Psoriatic arthritis (H)       Baseline Mental status: WDL  Current Mental Status changes: at basesline    Infection present or suspected this encounter: cultures pending  Sepsis suspected: No  Isolation type: No active isolations     Activity level - Baseline/Home:  Independent  Activity Level - Current:   Independent    Bariatric equipment needed?: No    In the ED these meds were given:   Medications   lactated ringers BOLUS 500 mL (500 mLs Intravenous New Bag 9/23/19 1957)   0.9% sodium chloride BOLUS (0 mLs Intravenous Stopped 9/23/19 1840)   ceFEPIme (MAXIPIME) 2 g vial to attach to  ml bag for ADULTS or 50 ml bag for PEDS (0 g Intravenous Stopped 9/23/19 1854)   lactated ringers BOLUS 1,000 mL (0 mLs Intravenous Stopped 9/23/19 1956)   acetaminophen (TYLENOL) tablet 1,000 mg (1,000 mg Oral Given 9/23/19 1800)       Drips running?  No    Home pump  No    Current LDAs  Peripheral IV 09/23/19 Left Upper forearm (Active)   Number of days: 0       AVAILABLE (Active)   Number of days:        Rash 01/22/19 1300 anterior chest (Active)   Number of days: 244       Rash 06/01/19 2000 back macular (Active)   Number of  "days: 114       Incision/Surgical Site 05/14/19 Bilateral Hip (Active)   Number of days: 132       Labs results:   Labs Ordered and Resulted from Time of ED Arrival Up to the Time of Departure from the ED   COMPREHENSIVE METABOLIC PANEL - Abnormal; Notable for the following components:       Result Value    Glucose 117 (*)     All other components within normal limits   CBC WITH PLATELETS DIFFERENTIAL - Abnormal; Notable for the following components:    WBC 2.8 (*)     RBC Count 2.62 (*)     Hemoglobin 9.8 (*)     Hematocrit 30.4 (*)      (*)     MCH 37.4 (*)     RDW 15.7 (*)     Platelet Count 18 (*)     Absolute Neutrophil 1.5 (*)     Absolute Lymphocytes 0.6 (*)     All other components within normal limits   ROUTINE UA WITH MICROSCOPIC REFLEX TO CULTURE - Abnormal; Notable for the following components:    Ketones Urine 10 (*)     Blood Urine Small (*)     Mucous Urine Present (*)     All other components within normal limits   ISTAT  GASES LACTATE ROSA ELENA POCT - Abnormal; Notable for the following components:    PO2 Venous 19 (*)     Bicarbonate Venous 29 (*)     All other components within normal limits   LEGIONELLA PNEUMONIA ANTIGEN URINE   PULSE OXIMETRY NURSING   CARDIAC CONTINUOUS MONITORING   NURSING DRAW AND HOLD   ISTAT CG4 GASES LACTATE ROSA ELENA NURSING POCT   BLOOD CULTURE   BLOOD CULTURE       Imaging Studies:   Recent Results (from the past 24 hour(s))   Chest XR,  PA & LAT    Narrative    No acute cardiopulmonary process. Multilevel thoracic spine  compression deformities.       Recent vital signs:   /66   Pulse 87   Temp 99.4  F (37.4  C) (Oral)   Resp 15   Ht 1.753 m (5' 9\")   Wt 79.7 kg (175 lb 11.2 oz)   SpO2 96%   BMI 25.95 kg/m      [place] Coma Scale Score: 15 (09/23/19 1843)       Cardiac Rhythm: Normal Sinus  Pt needs tele? Yes in ED, TBD by admitting MD  Skin/wound Issues: None    Code Status: Full Code    Pain control: pt had none    Nausea control: pt had none    Abnormal " labs/tests/findings requiring intervention: See Epic Lab results          Family present during ED course? Yes   Family Comments/Social Situation comments: none      Tasks needing completion: None    Ale Tavarez BSN RN CCRN    [phone] [facility]

## 2019-09-25 LAB
ALBUMIN UR-MCNC: 10 MG/DL
ANION GAP SERPL CALCULATED.3IONS-SCNC: 9 MMOL/L (ref 3–14)
ANISOCYTOSIS BLD QL SMEAR: SLIGHT
APPEARANCE UR: CLEAR
BASOPHILS # BLD AUTO: 0 10E9/L (ref 0–0.2)
BASOPHILS NFR BLD AUTO: 0 %
BILIRUB UR QL STRIP: NEGATIVE
BLD PROD TYP BPU: NORMAL
BLD UNIT ID BPU: 0
BLOOD PRODUCT CODE: NORMAL
BPU ID: NORMAL
BUN SERPL-MCNC: 10 MG/DL (ref 7–30)
CALCIUM SERPL-MCNC: 7.9 MG/DL (ref 8.5–10.1)
CHLORIDE SERPL-SCNC: 104 MMOL/L (ref 94–109)
CO2 SERPL-SCNC: 23 MMOL/L (ref 20–32)
COLOR UR AUTO: YELLOW
CREAT SERPL-MCNC: 0.83 MG/DL (ref 0.66–1.25)
CRP SERPL-MCNC: 160 MG/L (ref 0–8)
CRYPTOC AG SPEC QL: NORMAL
DIFFERENTIAL METHOD BLD: ABNORMAL
EOSINOPHIL # BLD AUTO: 0 10E9/L (ref 0–0.7)
EOSINOPHIL NFR BLD AUTO: 0.9 %
ERYTHROCYTE [DISTWIDTH] IN BLOOD BY AUTOMATED COUNT: 15.2 % (ref 10–15)
FLUAV H1 2009 PAND RNA SPEC QL NAA+PROBE: NEGATIVE
FLUAV H1 RNA SPEC QL NAA+PROBE: NEGATIVE
FLUAV H3 RNA SPEC QL NAA+PROBE: NEGATIVE
FLUAV RNA SPEC QL NAA+PROBE: NEGATIVE
FLUBV RNA SPEC QL NAA+PROBE: NEGATIVE
GFR SERPL CREATININE-BSD FRML MDRD: >90 ML/MIN/{1.73_M2}
GLUCOSE SERPL-MCNC: 116 MG/DL (ref 70–99)
GLUCOSE UR STRIP-MCNC: NEGATIVE MG/DL
GRAM STN SPEC: NORMAL
HADV DNA SPEC QL NAA+PROBE: NEGATIVE
HADV DNA SPEC QL NAA+PROBE: NEGATIVE
HCT VFR BLD AUTO: 26.3 % (ref 40–53)
HGB BLD-MCNC: 8.5 G/DL (ref 13.3–17.7)
HGB UR QL STRIP: ABNORMAL
HMPV RNA SPEC QL NAA+PROBE: NEGATIVE
HPIV1 RNA SPEC QL NAA+PROBE: NEGATIVE
HPIV2 RNA SPEC QL NAA+PROBE: NEGATIVE
HPIV3 RNA SPEC QL NAA+PROBE: NEGATIVE
IGG SERPL-MCNC: 507 MG/DL (ref 695–1620)
KETONES UR STRIP-MCNC: 5 MG/DL
LDH SERPL L TO P-CCNC: 257 U/L (ref 85–227)
LEUKOCYTE ESTERASE UR QL STRIP: NEGATIVE
LYMPHOCYTES # BLD AUTO: 0.3 10E9/L (ref 0.8–5.3)
LYMPHOCYTES NFR BLD AUTO: 13.9 %
Lab: NORMAL
Lab: NORMAL
MACROCYTES BLD QL SMEAR: PRESENT
MCH RBC QN AUTO: 37.1 PG (ref 26.5–33)
MCHC RBC AUTO-ENTMCNC: 32.3 G/DL (ref 31.5–36.5)
MCV RBC AUTO: 115 FL (ref 78–100)
MICROBIOLOGIST REVIEW: ABNORMAL
MONOCYTES # BLD AUTO: 0.1 10E9/L (ref 0–1.3)
MONOCYTES NFR BLD AUTO: 3.5 %
MUCOUS THREADS #/AREA URNS LPF: PRESENT /LPF
NEUTROPHILS # BLD AUTO: 2 10E9/L (ref 1.6–8.3)
NEUTROPHILS NFR BLD AUTO: 81.7 %
NITRATE UR QL: NEGATIVE
NUM BPU REQUESTED: 1
NUM BPU REQUESTED: 1
OVALOCYTES BLD QL SMEAR: SLIGHT
PH UR STRIP: 7.5 PH (ref 5–7)
PLATELET # BLD AUTO: 24 10E9/L (ref 150–450)
PLATELET # BLD AUTO: 8 10E9/L (ref 150–450)
PLATELET # BLD EST: ABNORMAL 10*3/UL
POIKILOCYTOSIS BLD QL SMEAR: SLIGHT
POTASSIUM SERPL-SCNC: 3.2 MMOL/L (ref 3.4–5.3)
POTASSIUM SERPL-SCNC: 3.7 MMOL/L (ref 3.4–5.3)
RBC # BLD AUTO: 2.29 10E12/L (ref 4.4–5.9)
RBC #/AREA URNS AUTO: 4 /HPF (ref 0–2)
RHINOVIRUS RNA SPEC QL NAA+PROBE: POSITIVE
RSV RNA SPEC QL NAA+PROBE: NEGATIVE
RSV RNA SPEC QL NAA+PROBE: NEGATIVE
SODIUM SERPL-SCNC: 135 MMOL/L (ref 133–144)
SOURCE: ABNORMAL
SP GR UR STRIP: 1.01 (ref 1–1.03)
SPECIMEN SOURCE: ABNORMAL
SPECIMEN SOURCE: NORMAL
TRANSFUSION RXN BLOOD BANK NOTIFICATION: NORMAL
TRANSFUSION STATUS PATIENT QL: NORMAL
UROBILINOGEN UR STRIP-MCNC: NORMAL MG/DL (ref 0–2)
WBC # BLD AUTO: 2.5 10E9/L (ref 4–11)
WBC #/AREA URNS AUTO: <1 /HPF (ref 0–5)

## 2019-09-25 PROCEDURE — 83605 ASSAY OF LACTIC ACID: CPT

## 2019-09-25 PROCEDURE — 36415 COLL VENOUS BLD VENIPUNCTURE: CPT | Performed by: INTERNAL MEDICINE

## 2019-09-25 PROCEDURE — 87899 AGENT NOS ASSAY W/OPTIC: CPT | Performed by: PHYSICIAN ASSISTANT

## 2019-09-25 PROCEDURE — 87103 BLOOD FUNGUS CULTURE: CPT | Performed by: INTERNAL MEDICINE

## 2019-09-25 PROCEDURE — 86140 C-REACTIVE PROTEIN: CPT | Performed by: PHYSICIAN ASSISTANT

## 2019-09-25 PROCEDURE — P9037 PLATE PHERES LEUKOREDU IRRAD: HCPCS | Performed by: INTERNAL MEDICINE

## 2019-09-25 PROCEDURE — 86611 BARTONELLA ANTIBODY: CPT | Performed by: PHYSICIAN ASSISTANT

## 2019-09-25 PROCEDURE — 25000128 H RX IP 250 OP 636: Performed by: INTERNAL MEDICINE

## 2019-09-25 PROCEDURE — 81001 URINALYSIS AUTO W/SCOPE: CPT | Performed by: INTERNAL MEDICINE

## 2019-09-25 PROCEDURE — 25000132 ZZH RX MED GY IP 250 OP 250 PS 637: Performed by: INTERNAL MEDICINE

## 2019-09-25 PROCEDURE — 25800030 ZZH RX IP 258 OP 636: Performed by: INTERNAL MEDICINE

## 2019-09-25 PROCEDURE — 20600000 ZZH R&B BMT

## 2019-09-25 PROCEDURE — 86606 ASPERGILLUS ANTIBODY: CPT | Performed by: PHYSICIAN ASSISTANT

## 2019-09-25 PROCEDURE — 40000341 ZZHCL STATISTIC BB TRANSF RXN INVEST: Performed by: INTERNAL MEDICINE

## 2019-09-25 PROCEDURE — 86698 HISTOPLASMA ANTIBODY: CPT | Performed by: PHYSICIAN ASSISTANT

## 2019-09-25 PROCEDURE — 86632 CHLAMYDIA IGM ANTIBODY: CPT | Performed by: PHYSICIAN ASSISTANT

## 2019-09-25 PROCEDURE — 85049 AUTOMATED PLATELET COUNT: CPT | Performed by: PHYSICIAN ASSISTANT

## 2019-09-25 PROCEDURE — 86612 BLASTOMYCES ANTIBODY: CPT | Performed by: PHYSICIAN ASSISTANT

## 2019-09-25 PROCEDURE — 25000132 ZZH RX MED GY IP 250 OP 250 PS 637: Performed by: PHYSICIAN ASSISTANT

## 2019-09-25 PROCEDURE — P9037 PLATE PHERES LEUKOREDU IRRAD: HCPCS | Performed by: PHYSICIAN ASSISTANT

## 2019-09-25 PROCEDURE — 87040 BLOOD CULTURE FOR BACTERIA: CPT | Performed by: INTERNAL MEDICINE

## 2019-09-25 PROCEDURE — 87798 DETECT AGENT NOS DNA AMP: CPT | Performed by: PHYSICIAN ASSISTANT

## 2019-09-25 PROCEDURE — 85025 COMPLETE CBC W/AUTO DIFF WBC: CPT | Performed by: INTERNAL MEDICINE

## 2019-09-25 PROCEDURE — 84132 ASSAY OF SERUM POTASSIUM: CPT | Performed by: PHYSICIAN ASSISTANT

## 2019-09-25 PROCEDURE — 25000128 H RX IP 250 OP 636: Performed by: STUDENT IN AN ORGANIZED HEALTH CARE EDUCATION/TRAINING PROGRAM

## 2019-09-25 PROCEDURE — 83615 LACTATE (LD) (LDH) ENZYME: CPT | Performed by: PHYSICIAN ASSISTANT

## 2019-09-25 PROCEDURE — 80048 BASIC METABOLIC PNL TOTAL CA: CPT | Performed by: INTERNAL MEDICINE

## 2019-09-25 PROCEDURE — 86720 LEPTOSPIRA ANTIBODY: CPT | Performed by: PHYSICIAN ASSISTANT

## 2019-09-25 PROCEDURE — 86481 TB AG RESPONSE T-CELL SUSP: CPT | Performed by: PHYSICIAN ASSISTANT

## 2019-09-25 PROCEDURE — 36415 COLL VENOUS BLD VENIPUNCTURE: CPT | Performed by: PHYSICIAN ASSISTANT

## 2019-09-25 PROCEDURE — 87799 DETECT AGENT NOS DNA QUANT: CPT | Performed by: PHYSICIAN ASSISTANT

## 2019-09-25 PROCEDURE — 82784 ASSAY IGA/IGD/IGG/IGM EACH: CPT | Performed by: PHYSICIAN ASSISTANT

## 2019-09-25 PROCEDURE — 86635 COCCIDIOIDES ANTIBODY: CPT | Performed by: PHYSICIAN ASSISTANT

## 2019-09-25 PROCEDURE — 87385 HISTOPLASMA CAPSUL AG IA: CPT | Performed by: PHYSICIAN ASSISTANT

## 2019-09-25 RX ORDER — CELECOXIB 100 MG/1
100 CAPSULE ORAL ONCE
Status: COMPLETED | OUTPATIENT
Start: 2019-09-25 | End: 2019-09-25

## 2019-09-25 RX ORDER — NALOXONE HYDROCHLORIDE 0.4 MG/ML
.1-.4 INJECTION, SOLUTION INTRAMUSCULAR; INTRAVENOUS; SUBCUTANEOUS
Status: DISCONTINUED | OUTPATIENT
Start: 2019-09-25 | End: 2019-09-26

## 2019-09-25 RX ORDER — NALOXONE HYDROCHLORIDE 0.4 MG/ML
.1-.4 INJECTION, SOLUTION INTRAMUSCULAR; INTRAVENOUS; SUBCUTANEOUS
Status: DISCONTINUED | OUTPATIENT
Start: 2019-09-25 | End: 2019-09-29 | Stop reason: HOSPADM

## 2019-09-25 RX ORDER — MEPERIDINE HYDROCHLORIDE 25 MG/ML
25 INJECTION INTRAMUSCULAR; INTRAVENOUS; SUBCUTANEOUS
Status: DISCONTINUED | OUTPATIENT
Start: 2019-09-25 | End: 2019-09-29 | Stop reason: HOSPADM

## 2019-09-25 RX ADMIN — Medication 600 MG: at 18:07

## 2019-09-25 RX ADMIN — ACETAMINOPHEN 650 MG: 325 TABLET, FILM COATED ORAL at 18:07

## 2019-09-25 RX ADMIN — MEPERIDINE HYDROCHLORIDE 25 MG: 25 INJECTION INTRAMUSCULAR; INTRAVENOUS; SUBCUTANEOUS at 13:11

## 2019-09-25 RX ADMIN — CEFEPIME HYDROCHLORIDE 2 G: 2 INJECTION, POWDER, FOR SOLUTION INTRAVENOUS at 04:17

## 2019-09-25 RX ADMIN — CELECOXIB 100 MG: 100 CAPSULE ORAL at 08:10

## 2019-09-25 RX ADMIN — CEFEPIME HYDROCHLORIDE 2 G: 2 INJECTION, POWDER, FOR SOLUTION INTRAVENOUS at 19:54

## 2019-09-25 RX ADMIN — AZITHROMYCIN 500 MG: 250 TABLET, FILM COATED ORAL at 08:15

## 2019-09-25 RX ADMIN — ACYCLOVIR 800 MG: 800 TABLET ORAL at 19:54

## 2019-09-25 RX ADMIN — MELATONIN 2000 UNITS: at 08:15

## 2019-09-25 RX ADMIN — ACETAMINOPHEN 650 MG: 325 TABLET, FILM COATED ORAL at 05:55

## 2019-09-25 RX ADMIN — ACETAMINOPHEN 650 MG: 325 TABLET, FILM COATED ORAL at 22:38

## 2019-09-25 RX ADMIN — POTASSIUM CHLORIDE 20 MEQ: 750 TABLET, EXTENDED RELEASE ORAL at 11:33

## 2019-09-25 RX ADMIN — POTASSIUM CHLORIDE 40 MEQ: 750 TABLET, EXTENDED RELEASE ORAL at 08:15

## 2019-09-25 RX ADMIN — ACETAMINOPHEN 650 MG: 325 TABLET, FILM COATED ORAL at 01:40

## 2019-09-25 RX ADMIN — SODIUM CHLORIDE, PRESERVATIVE FREE: 5 INJECTION INTRAVENOUS at 02:34

## 2019-09-25 RX ADMIN — PAROXETINE HYDROCHLORIDE HEMIHYDRATE 20 MG: 20 TABLET, FILM COATED ORAL at 08:15

## 2019-09-25 RX ADMIN — CEFEPIME HYDROCHLORIDE 2 G: 2 INJECTION, POWDER, FOR SOLUTION INTRAVENOUS at 13:11

## 2019-09-25 RX ADMIN — ACETAMINOPHEN 650 MG: 325 TABLET, FILM COATED ORAL at 13:16

## 2019-09-25 RX ADMIN — Medication 600 MG: at 08:15

## 2019-09-25 RX ADMIN — ACYCLOVIR 800 MG: 800 TABLET ORAL at 08:15

## 2019-09-25 ASSESSMENT — ACTIVITIES OF DAILY LIVING (ADL)
ADLS_ACUITY_SCORE: 10

## 2019-09-25 ASSESSMENT — MIFFLIN-ST. JEOR: SCORE: 1623.19

## 2019-09-25 NOTE — PLAN OF CARE
Problem: Adult Inpatient Plan of Care  Goal: Plan of Care Review  9/25/2019 1440 by Dena Carmona, RN  Outcome: No Change  9/25/2019 0627 by Roslyn Ferrara, RN  Outcome: No Change   Pt continues to be febrile, spiked to 101.4 ax with rigors at the end of platelets transfusion. Blood bank and MD notified. Transfusion work up done. Demerol 25mg iv and Tylenol given for fevers and rigors. Temp elevated to 103.5ax with sinus tachycardia. Pt alert and oriented, denied pain. NS at 100ml/hr, eating and drinking good. Up to BR independently. PIV in left arm patent. ID consulted with persistent fevers. 60mEq po K replaced and recheck K3.7.

## 2019-09-25 NOTE — PROGRESS NOTES
"BMT Daily Progress Note      Patient ID: Angel Yanez is a 58 yo man Day +131 s/p 2nd auto for IgM kappa MM      Diagnosis MM Multiple myeloma  HCT Type Autologous    Prep Regimen Cytoxan  Melphalan   Donor Source Self     No  Primary BMT Provider Dr Lcuio         INTERVAL  HISTORY     Admitted through ED fever- high fevers despite IV abx. No growth on cultures. Guille tells me is feeling increasingly rough with fevers- just very fatigued, no appetite - doesn't feel like getting out of bed. He denies any sore throat, stable long standing (past month plus) of cough, no SOB at all recently. Overall stamina had been improving. Given high fever spent some time discussing any unusual exposures- really has been walking much so doesn' think he he has had a tick bite, has a dog at home but no other pets nor livestock exposure.       Review of Systems: 10 point ROS negative except as noted above.     Physical Exam  Blood pressure 97/70, pulse 112, temperature 100.9  F (38.3  C), temperature source Axillary, resp. rate 18, height 1.798 m (5' 10.8\"), weight 79.9 kg (176 lb 3.2 oz), SpO2 94 %.    Wt Readings from Last 4 Encounters:   09/25/19 79.9 kg (176 lb 3.2 oz)   09/17/19 80.2 kg (176 lb 12.8 oz)   09/10/19 79.9 kg (176 lb 1.6 oz)   09/03/19 79.2 kg (174 lb 9.6 oz)     General: NAD, pleasant  Eyes:  sclera anicteric and not injected  Nose/Mouth/Throat: OP moist, no ulcerations   Lungs: CTA bilaterally   Cardiovascular: RRR, no M/R/G   Lymphatics: no edema  Skin: no rash or petechiae   Neuro: non-focal, no gross deficits  No access.     Labs  Lab Results   Component Value Date    WBC 2.5 (L) 09/25/2019    ANEU 2.0 09/25/2019    HGB 8.5 (L) 09/25/2019    HCT 26.3 (L) 09/25/2019    PLT 8 (LL) 09/25/2019     09/25/2019    POTASSIUM 3.2 (L) 09/25/2019    CHLORIDE 104 09/25/2019    CO2 23 09/25/2019     (H) 09/25/2019    BUN 10 09/25/2019    CR 0.83 09/25/2019    MAG 2.2 08/23/2019    INR 1.19 (H) 09/24/2019    " BILITOTAL 0.6 09/23/2019    AST 10 09/23/2019    ALT 18 09/23/2019    ALKPHOS 80 09/23/2019    PROTTOTAL 7.0 09/23/2019    ALBUMIN 3.9 09/23/2019     Assessment and Plan  Assessment and Plan  Angel Yanez is a 60 yo man Day +131 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  BMT/MM:   Slow engraftment; cell dose was only 0.639x10^6. ([facility] [place] - known poor cell dose as failed chemo-mobilization 1/2019.)   - Stringent CR.     2.  HEME: Keep Hgb>8g/dL, plt >10.   - Ongoing cytopenias - low cell dose with transplant. Pt was given GCSF 9/10 and again 9/17 with ANC = 0.6.COunts improved today- infection?    - Plts down - likely due to fevers. Check post plt today  - If IR thinks any of these lymph nodes are amenible to bx will increase plt parameter >50k. Awaiting call back from IR to discuss.        3.  ID: Fevers trending up - 103.7 - mostly persistently febrile   - Fevers in the setting of intermittent neutropenia: No definite localizing source. Pt has a chronic mild cough which worsened slightly with fever.   - 9/23 cxr: clear. Fever curve up so hcest CT 9/24: IMprovement in prior GGO/tree and bud opacities but new mediastinal and axillary lymphadenopathy w/ fat straining. On exam some mild right axilla, right groin and submandibular tender adenopathy. Ddx: PTLD, EBV reactivation, other   - Obtain ID consult potential cause of fevers and IR consult for possible lymph node bx  -EBV quant, CRP and LDH pending today.   -fengitell/asp GM pending from 9/25.   - Continue cefepime/azithro  - f/u blood cultures.  UA unremarkable, and urine streptococcal and legionella antigens-negative.  - RVP Pending.   - Monitor fever curve.  - Pt has recent hx pseudomonas bacteremia 7/21/19 (BC+ from both Hsieh and periphery). He was initially treated with tobramycin daily through 7/23 which was switched to cefepime 7/24 based on sensitivities. Hsieh was removed on 7/23. He had a left arm PICC inserted 7/25 through which he  completed 10 days IV cefepime daily at home through 8/2. PICC was then removed on 8/2. Pt also has hx of sinusitis (CT sinus 5/30/19 and 7/21/19).   - Continue prophylactic ACV. Prophylactic pentamidine was last given 9/17.  - On CMV vaccine trial.      4. GI:  No complaints.  - GI prophylaxis: omeprazole.       5.  FEN/Renal: Creat, lytes wnl. Eating well.   -  ml/hr.   - PRN lyte replacement per standing protocol.     6.  Mood: Continue Paxil.    Dispo: monitor fevers. Await improvement in fever curve and work up of adenopathy.     Myrna Marrufo PA-C  [phone]    ATTENDING ADDENDUM:    I have independently seen and evaluated the patient on September 24, 2019 and reviewed clinical, laboratory, and radiographic findings. I have discussed the plan with the team and agree with the attached note with the following edits:    Angel Yanez is a 59 year old year old male, with MM s/p auto #2, 4m out, recent pseudomonas BSI, here for fever while not neutropenic    Fevers are trending down this am, no associated symptoms.     Ph/E: Vitals reviewed. No distress. Oropharynx clear. Neck supple. Heart RRR. Lungs clear. Abdomen soft. No peripheral edema. No rash. Neuro nonfocal.   CT with new adenopathy mediastinum, axilla, suspect viral sy, systemic infection or PTLD.    A&P: will ask ID to see, add CRP and LDH.   consider IR guided lymph node bx if fevers persist. Try Celebrex 100mg BID. Not ill, cont  plt transfusions.ANC is recovered.   Cefepim and await blood Cx. Will stop Azithromycin tomorrow.        acyclovir  800 mg Oral BID     azithromycin  500 mg Oral Daily     calcium carbonate  600 mg Oral BID w/meals     ceFEPIme (MAXIPIME) IV  2 g Intravenous Q8H     PARoxetine  20 mg Oral QAM     vitamin D3  2,000 Units Oral Daily       Kody Mukherjee MD

## 2019-09-25 NOTE — PLAN OF CARE
[id number]  Tmax 103.2- Tylenol given x3 and the lowest temp was 100.6 all night. Given cool sponge bath which did seem to help bring the temp down a little bit faster. Tachycardic up to 113 with fevers, other VSS. Denies P/N/V. Voiding well. Still need UA. No BM overnight. Up ad mickey. Needs PO KCl this morning. Will also need platelets this morning once blood consent is received. Continue to monitor and follow POC.  Problem: Adult Inpatient Plan of Care  Goal: Plan of Care Review  9/25/2019 0627 by Roslyn Ferrara, RN  Outcome: No Change     Problem: Adult Inpatient Plan of Care  Goal: Optimal Comfort and Wellbeing  9/25/2019 0627 by Roslyn Ferrara, RN  Outcome: No Change     Problem: Fever (Fever with Neutropenia)  Goal: Baseline Body Temperature  Outcome: No Change     Problem: Infection Risk (Fever with Neutropenia)  Goal: Absence of Infection  Outcome: No Change

## 2019-09-25 NOTE — PROVIDER NOTIFICATION
Paged aleksey @ #[phone] inform that patient does not have a blood consent and needs platelets this morning, as they were 8.

## 2019-09-25 NOTE — CONSULTS
[facility]  Transplant Infectious Disease Consult Note - New Patient     Patient:  Angel Yanez, Date of birth 1960, Medical record number [id number]  Date of Visit:  09/25/2019  Consult requested by Dr. Darnell for evaluation of fevers         Assessment and Recommendations:   Recommendations:  - check CMV quantitative DNA, LDH, Histplasma antigen (urine), Fungal antibodies, Cryptococcal antigen, Anaplasma PCR, Babesia PCR, Parasite stain, Leptospira IgM, Chlamydia group IgM, Bartonella IgG and IgM, Quantiferon gold (all tests performed on blood unless otherwise specified)  - agree with lymph node biopsy, would send for gram stain and culture, von and fungal culture, AFB stain and AFB culture as well as path/cytology/EBV  - continue IV cefepime for now  - continue acyclovir for prophylaxis for now    Thank you very much for this consultation. Transplant Infectious Disease will continue to follow with you.    Assessment:   60 yo male who is s/p autologous PBSCT (Cytoxan/Mephalan) on 5/17/19 for MM, port-related Pseudomonas bacteremia 7/21 (Hsieh removed, treated with 10 days of IV cefepime) who is admitted 9/23/19 with one day of fevers.     ID consulted for assistance in workup and management of fevers.     Acute Infectious Disease issues include:  # Fever  # Lymphadenopathy - most prominent mediastinal and axillary   # Hx of PBSCT on 5/17/19 with poor cell dose and intermittent neutropenia / lymphopenia since transplant   Patient presents with abrupt onset of high fever without localizable symptoms and found to have new mediastinal and axillary lymphadenopathy. He has had minimal improvement with IV cefepime after almost 48 hours of treatment and his procalcitonin is negative arguing somewhat against typical bacterial etiologies, although some bacterial etiologies are considered--as well as are some viral and fungal.     Unclear whether recent trip to [place] and recent  "\"cold\" (which he reports recovering from) are related to current presentation. Viral infection such as CMV are considered. Endemic fungi such as Histoplasma, Cryptococcus should be considered. Considering no nodules/infiltrate on chest CT Aspergillus unlikely (as well as he has not been so profoundly neutropenic to put him at highest risk). As he lives in [place] and has a dog tick-borne diseases enter the differential and we can assess for Anaplasma, Babesia (presentation does not fit as well with Lyme). His dog also puts him at risk for Leptospirosis as well. I do not find any prior checks for latent TB and we can check a quantiferon (although he is low risk and dose not require airborne isolation). He has been in contact with birds and we can check Chlamydia group antibodies. We can check Bartonella antibodies as well.     Other things on the differential for new lymphadenopathy, however considered less likely (for various reasons, in some cases including lack of known exposures or lack of supporting signs/symptoms) include Bartonella henselae, acute HIV infection, Diptheria, [place]mia, Brucellosis, secondary syphilis, Toxoplasma. However we will hold off on additional testing for these entities for now, particularly considering there is a lymph node biopsy planned per primary, in part to evaluate for additional possible etiology of PTLD. EBV viral load pending as well.     Additional Infectious Disease issues include:  - QTc interval: 451 on 8/23/19 (pt has allergy to moxifloxacin)  - Bacterial prophylaxis:  Currently on cefepime   - Pneumocystis prophylaxis: none  - Viral serostatus & prophylaxis: CMV+ EBV + HSV + on acyclovir   - Fungal prophylaxis: none currently (discontinue on 9/27/19)   - Immunization status:  Unknown   - Gamma globulin status: IgG was 824 on 8/23/19     Carmen Cisneros MD  Infectious Diseases Fellow  Pager [phone]         History of Infectious Disease Illness:   Patient is admitted on " "9/23/19 for one day of fevers. Says he was feeling his usual self until the evening before, more fatigued. Still went to work on Monday (9/23/19) however felt \"wiped\" and took temperature and was slightly high, went home and then confirmed fever of 102F. Called triage line and recommendation to come to the ED and the patient has subsequently been admitted. He was started on IV cefepime on admission and CT chest obtained with new axillary and mediastinal lymphadenopathy.     At the bedside today the patient is feeling warm and sweaty. He has a dry cough that is unchanged, worse with talking-he says he has had the cough since the time of his transplant. Denies feeling SOB. Denies headache, photophobia, otorrhea or ear pain, runny nose, sore throat, oral ulcers, chest pain, abdominal pain / nausea, urinary symptoms such as burning with urination or hematuria, rash or new skin lesions. He does not have any ports / lines in place apart from the PIV placed on admission. Says he went to [facility] around 3-4 weeks ago and went on 2-3 mile hike. There were some birds that were eating food from their hands but no traumatic interaction with the birds. No one sick on the trip. The patient drove there. Did not go anywhere farther south such as [place] / [place]. He lives in [place] with his wife. Has a dog. He moved the lawn last week but wore a mask. No recent water / mud exposures, has not been [place]. No recent excavations / consturction on his home. 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 His wife runs a small day care out of there home.         Review of Systems:  12 point ROS reviewed and negative other thhan noted in the HPI and additional for:  + easy bruising / bleeding    Past Medical History:   Diagnosis Date     GERD (gastroesophageal reflux disease)      H/O autologous stem cell transplant (H) 02/2005     Hyperlipidemia      Multiple myeloma (H) 2004     PONV (postoperative nausea " and vomiting)      Psoriasis      Psoriatic arthritis (H)        Past Surgical History:   Procedure Laterality Date     ARTHROPLASTY HIP       COLONOSCOPY       HERNIA REPAIR       IR CVC TUNNEL PLACEMENT > 5 YRS OF AGE  1/22/2019     IR CVC TUNNEL PLACEMENT > 5 YRS OF AGE  5/16/2019     IR CVC TUNNEL REMOVAL LEFT  2/20/2019     IR CVC TUNNEL REMOVAL RIGHT  7/23/2019     IR FOLLOW UP VISIT OUTPATIENT  1/24/2019     ORTHOPEDIC SURGERY       PROCURE BONE MARROW N/A 5/14/2019    Procedure: Bone Marrow [place];  Surgeon: Antwan Erickson MD;  Location: [facility] OR     TRANSPLANT         Family History   Problem Relation Age of Onset     Diabetes Mother        Social History     Patient does not qualify to have social determinant information on file (likely too young).   Social History Narrative     Not on file     Social History     Tobacco Use     Smoking status: Former Smoker     Smokeless tobacco: Never Used   Substance Use Topics     Alcohol use: Yes     Comment: occasionaly     Drug use: No       Immunization History   Administered Date(s) Administered     Influenza (IIV3) PF 12/20/2013     Influenza Vaccine, 3 YRS +, IM (QUADRIVALENT W/PRESERVATIVES) 10/08/2015, 10/26/2016, 11/04/2017     TD (ADULT, 7+) 12/11/1996     TDAP Vaccine (Boostrix) 10/08/2015, 07/15/2019     Tetanus 12/11/1996       Patient Active Problem List   Diagnosis     Multiple myeloma in remission (H)     Multiple myeloma (H)     Multiple myeloma not having achieved remission (H)     Bone marrow donor     Neutropenia with fever (H)     Neutropenic fever (H)            Current Medications & Allergies:       acyclovir  800 mg Oral BID     azithromycin  500 mg Oral Daily     calcium carbonate  600 mg Oral BID w/meals     ceFEPIme (MAXIPIME) IV  2 g Intravenous Q8H     PARoxetine  20 mg Oral QAM     vitamin D3  2,000 Units Oral Daily       Infusions/Drips:    sodium chloride 100 mL/hr at 09/25/19 0234       Allergies   Allergen Reactions      Chlorhexidine Itching     Avalide Hives     Chloroxylenol Rash     [employer]icare solution     Lorazepam Hives     Other reaction(s): Hives       Moxifloxacin Hives            Physical Exam:     Patient Vitals for the past 24 hrs:   BP Temp Temp src Pulse Resp SpO2 Weight   09/25/19 1250 (!) 152/76 101.4  F (38.6  C) Axillary 117 18 94 % --   09/25/19 1139 111/66 99.6  F (37.6  C) Axillary 90 16 97 % --   09/25/19 1127 109/59 99.2  F (37.3  C) Axillary -- 16 98 % --   09/25/19 1104 100/61 97.4  F (36.3  C) Axillary 92 18 95 % --   09/25/19 0928 97/63 100.5  F (38.1  C) -- 101 18 93 % --   09/25/19 0818 97/70 100.9  F (38.3  C) Axillary 112 18 94 % --   09/25/19 0758 120/75 100.9  F (38.3  C) Axillary -- -- 94 % --   09/25/19 0757 -- -- -- -- -- -- 79.9 kg (176 lb 3.2 oz)   09/25/19 0556 -- 101.6  F (38.7  C) -- -- -- -- --   09/25/19 0418 114/72 100.6  F (38.1  C) Axillary -- 16 94 % --   09/25/19 0230 -- 102.6  F (39.2  C) Axillary -- -- -- --   09/25/19 0139 -- 103.1  F (39.5  C) Oral -- -- -- --   09/25/19 0138 134/84 103.2  F (39.6  C) Axillary -- 18 92 % --   09/24/19 2255 113/65 102.4  F (39.1  C) Axillary 103 18 91 % --   09/24/19 2130 -- 102.9  F (39.4  C) Axillary -- -- -- --   09/24/19 2030 -- 102.1  F (38.9  C) Axillary -- -- -- --   09/24/19 1930 110/68 100.1  F (37.8  C) Axillary 91 18 92 % --   09/24/19 1759 -- 99.9  F (37.7  C) Axillary -- -- -- --   09/24/19 1627 -- 103.7  F (39.8  C) -- -- -- -- --   09/24/19 1522 129/87 102.2  F (39  C) Axillary 109 18 95 % --     Ranges for vital signs:  Temp:  [97.4  F (36.3  C)-103.7  F (39.8  C)] 101.4  F (38.6  C)  Pulse:  [] 117  Heart Rate:  [] 93  Resp:  [16-18] 18  BP: ()/(59-87) 152/76  SpO2:  [91 %-98 %] 94 %  Vitals:    09/23/19 1720 09/24/19 0000 09/25/19 0757   Weight: 79.7 kg (175 lb 11.2 oz) 79.2 kg (174 lb 9.6 oz) 79.9 kg (176 lb 3.2 oz)       Physical Examination:  GENERAL:  well-developed, well-nourished, in bed very  diaphoretic  HEAD:  Head is normocephalic, atraumatic   EYES:  Eyes have anicteric sclerae without conjunctival injection   ENT:  Oropharynx is moist without exudates or ulcers. Tongue is midline  NECK:  Supple. No cervical lymphadenopathy  LUNGS:  Clear to auscultation bilaterally.   CARDIOVASCULAR:  Regular rate and rhythm with no murmurs, gallops or rubs.  ABDOMEN:  Normal bowel sounds, soft, nontender. No appreciable hepatosplenomegaly.  SKIN:  No acute rashes.  PIV in place without any surrounding erythema or exudate.  NEUROLOGIC:  Grossly nonfocal. Active x4 extremities  Lymph: + axillary lymphadnopathy. No cervical or supraclavicular lymphadenopathy          Laboratory Data:     Inflammatory Markers    Recent Labs   Lab Test 09/25/19  1022   .0*       Immune Globulin Studies     Recent Labs   Lab Test 08/23/19  1301 06/11/19  0918 05/06/19  0936 01/29/19  0810 01/14/19  0945    572* 636* 826 864   IGM 25* 20* 26* 43* 50*   IGE  --   --  3  --  6   IGA 15* 41* 45* 311 327       Metabolic Studies       Recent Labs   Lab Test 09/25/19  0301 09/25/19  0258 09/24/19  0149 09/23/19  1739  08/23/19  1301  06/02/19  1045     --  138  --    < > 139   < >  --    POTASSIUM 3.2*  --  3.7  --    < > 3.9   < >  --    CHLORIDE 104  --  105  --    < > 107   < >  --    CO2 23  --  27  --    < > 28   < >  --    ANIONGAP 9  --  6  --    < > 4   < >  --    BUN 10  --  10  --    < > 14   < >  --    CR 0.83  --  0.85  --    < > 0.73   < >  --    GFRESTIMATED >90  --  >90  --    < > >90   < >  --    *  --  134*  --    < > 85   < >  --    ZHEN 7.9*  --  8.0*  --    < > 8.8   < >  --    PHOS  --   --   --   --   --  3.6   < >  --    MAG  --   --   --   --   --  2.2   < >  --    LACT  --  0.9  --  1.4  --   --    < >  --    PCAL  --   --  0.27  --   --   --   --   --    FGTL  --   --   --   --   --   --   --  <31    < > = values in this interval not displayed.       Hepatic Studies    Recent Labs   Lab Test  09/25/19  1022 09/23/19  1737 09/03/19  0931 08/23/19  1301  06/03/19  0305   BILITOTAL  --  0.6 0.4 0.4   < > 0.2   DBIL  --   --   --   --   --  <0.1   ALKPHOS  --  80 76 80   < > 55   PROTTOTAL  --  7.0 7.1 7.3   < > 5.7*   ALBUMIN  --  3.9 3.8 4.1   < > 2.6*   AST  --  10 16 18   < > 18   ALT  --  18 15 20   < > 23   *  --   --  200  --   --     < > = values in this interval not displayed.     Gout Labs      Recent Labs   Lab Test 08/23/19  1301 05/16/19  1140 05/06/19  0936 01/23/19  0259 01/14/19  0945   URIC 4.2 4.6 4.6 4.4 4.2       Hematology Studies      Recent Labs   Lab Test 09/25/19  1018 09/25/19  0301 09/24/19  0149 09/23/19  1737 09/17/19  0744 09/10/19  0731 09/03/19  0931   WBC  --  2.5* 2.6* 2.8* 2.2* 2.3* 2.5*   ANEU  --  2.0 1.6 1.5* 0.6* 0.9* 1.2*   ALYM  --  0.3* 0.5* 0.6* 1.0 0.9 0.7*   NEGAR  --  0.1 0.5 0.6 0.5 0.4 0.4   AEOS  --  0.0 0.1 0.2 0.2 0.1 0.1   HGB  --  8.5* 8.1* 9.8* 9.7* 9.8* 9.8*   HCT  --  26.3* 24.0* 30.4* 28.9* 29.3* 29.3*   PLT 24* 8* 16* 18* 22* 21* 22*       Clotting Studies    Recent Labs   Lab Test 09/24/19  0149  07/22/19  0009 06/03/19  0305 05/29/19  1401   INR 1.19*  --  1.16* 1.10 1.14   PTT 42*   < > 74*  --  44*    < > = values in this interval not displayed.       Iron Testing    Recent Labs   Lab Test 09/25/19  0301   *     Arterial Blood Gas Testing    Recent Labs   Lab Test 01/14/19  1029   PH 7.43   PCO2 36   PO2 90   HCO3 23   O2PER 21      Urine Studies     Recent Labs   Lab Test 09/25/19  0822 09/23/19  1757 07/21/19 2013 05/30/19  2228 05/26/19  2104 05/06/19  0936   URINEPH 7.5* 6.0 6.0 6.0 6.0 6.0   NITRITE Negative Negative Negative Negative Negative Negative   LEUKEST Negative Negative Negative Negative Negative Negative   WBCU <1 1 1 1  --  1     Microbiology:  Fungal testing  Recent Labs   Lab Test 06/02/19  1045   FGTL <31   ASPGAI 0.04   ASPGAA Negative       Last Culture results with specimen source  Culture Micro   Date Value  Ref Range Status   09/25/2019 PENDING  Preliminary   09/25/2019 PENDING  Preliminary   09/24/2019 No growth after 14 hours  Preliminary   09/24/2019 No growth after 14 hours  Preliminary   09/24/2019 Canceled, Test credited  Duplicate request    Final   09/24/2019 Culture negative monitoring continues  Preliminary   09/23/2019 No growth after 2 days  Preliminary   09/23/2019 No growth after 1 day  Preliminary   09/23/2019 No growth after 1 day  Preliminary   07/25/2019 No growth  Final   07/25/2019 No growth  Final   07/24/2019 No growth  Final   07/23/2019 No growth  Final   07/23/2019 (A)  Final    Cultured on the 1st day of incubation:  Pseudomonas aeruginosa     07/23/2019   Final    Critical Value/Significant Value, preliminary result only, called to and read back by   Karin Ramirez RN. 7/23/19 @ 2112, [facility]     07/23/2019 Susceptibility testing done on previous specimen  Final   07/22/2019 No VRE isolated  Final   07/22/2019 (A)  Final    Cultured on the 1st day of incubation:  Pseudomonas aeruginosa     07/22/2019   Final    Critical Value/Significant Value, preliminary result only, called to and read back by  KARIN RAMIREZ RN [id number] 7.24.19 ND     07/22/2019 Susceptibility testing done on previous specimen  Final   07/22/2019 No growth  Final    Specimen Description   Date Value Ref Range Status   09/25/2019 Blood Right Arm  Final   09/25/2019 Blood Right Arm  Final   09/24/2019 Blood  Final   09/24/2019 Blood Right Arm  Final   09/24/2019 Swab  Final   09/24/2019 Swab Rectal  Final   09/24/2019 Catheterized Urine  Final   09/23/2019 Blood Left Arm  Final   09/23/2019 Urine  Final   09/23/2019 Blood Right Arm  Final   09/23/2019 Blood Left Arm  Final   07/25/2019 Blood gray port  Final   07/25/2019 Blood Right Arm  Final   07/24/2019 Blood Right Arm  Final   07/23/2019 Catheter tip  Final   07/23/2019 Blood PURPLE PORT  Final   07/22/2019 Feces  Final   07/22/2019 Blood PURPLE PORT  Final   07/22/2019  Blood Red port  Final        Last check of C difficile  C Diff Toxin B PCR   Date Value Ref Range Status   05/24/2019 Negative NEG^Negative Final     Comment:     Negative: Clostridium difficile target DNA sequences NOT detected, presumed   negative for Clostridium difficile toxin B or the number of bacteria present   may be below the limit of detection for the test.  FDA approved assay performed using whodoyou GeneXpert real-time PCR.  A negative result does not exclude actual disease due to Clostridium difficile   and may be due to improper collection, handling and storage of the specimen   or the number of organisms in the specimen is below the detection limit of the   assay.         Syphilis Testing    Rapid Plasma Reagin   Date Value Ref Range Status   01/05/2005 Negative NEG Final     Virology:  CMV viral loads    Recent Labs   Lab Test 08/23/19  1302 08/06/19  1119 07/15/19  0955 07/08/19  1203 07/01/19  0854   CSPEC Plasma, EDTA anticoagulant Plasma, EDTA anticoagulant Plasma, EDTA anticoagulant Plasma, EDTA anticoagulant Plasma, EDTA anticoagulant   CMVLOG Not Calculated Not Calculated Not Calculated Not Calculated Not Calculated       Log IU/mL of CMVQNT   Date Value Ref Range Status   08/23/2019 Not Calculated <2.1 [Log_IU]/mL Final   08/06/2019 Not Calculated <2.1 [Log_IU]/mL Final   07/15/2019 Not Calculated <2.1 [Log_IU]/mL Final   07/08/2019 Not Calculated <2.1 [Log_IU]/mL Final   07/01/2019 Not Calculated <2.1 [Log_IU]/mL Final   06/24/2019 Not Calculated <2.1 [Log_IU]/mL Final   06/17/2019 Not Calculated <2.1 [Log_IU]/mL Final   06/07/2019 Not Calculated <2.1 [Log_IU]/mL Final   05/29/2019 Not Calculated <2.1 [Log_IU]/mL Final   05/17/2019 Not Calculated <2.1 [Log_IU]/mL Final      Hepatitis C Antibody   Date Value Ref Range Status   02/24/2006 Negative NEG Final   02/04/2005 Negative NEG Final       CMV IgG Antibody   Date Value Ref Range Status   01/05/2005 >160.0 EU/mL Final     Comment:      Positive for anti-CMV IgG     Herpes Simplex IgG Antibody Immune Status Ratio   Date Value Ref Range Status   02/04/2005 1.21  Final     Herpes Simplex IgG Antibody Interpretation   Date Value Ref Range Status   02/04/2005 Positive, suggests immunologic exposure.  Final     EBV Capsid Antibody IgG   Date Value Ref Range Status   05/06/2019 >8.0 (H) 0.0 - 0.8 AI Final     Comment:     Positive, suggests recent or past exposure  Antibody index (AI) values reflect qualitative changes in antibody   concentration that cannot be directly associated with clinical condition or   disease state.     01/14/2019 >8.0 (H) 0.0 - 0.8 AI Final     Comment:     Positive, suggests recent or past exposure  Antibody index (AI) values reflect qualitative changes in antibody   concentration that cannot be directly associated with clinical condition or   disease state.       Herpes Simplex Virus Type 1 IgG   Date Value Ref Range Status   05/06/2019 <0.2 0.0 - 0.8 AI Final     Comment:     No HSV-1 IgG antibodies detected.  Antibody index (AI) values reflect qualitative changes in antibody   concentration that cannot be directly associated with clinical condition or   disease state.     01/14/2019 <0.2 0.0 - 0.8 AI Final     Comment:     No HSV-1 IgG antibodies detected.  Antibody index (AI) values reflect qualitative changes in antibody   concentration that cannot be directly associated with clinical condition or   disease state.       Herpes Simplex Virus Type 2 IgG   Date Value Ref Range Status   05/06/2019 0.7 0.0 - 0.8 AI Final     Comment:     No HSV-2 IgG antibodies detected.  Antibody index (AI) values reflect qualitative changes in antibody   concentration that cannot be directly associated with clinical condition or   disease state.     01/14/2019 5.7 (H) 0.0 - 0.8 AI Final     Comment:     Positive.  IgG antibody to HSV-2 detected.  Antibody index (AI) values reflect qualitative changes in antibody   concentration that cannot be  directly associated with clinical condition or   disease state.         Imaging:  Recent Results (from the past 48 hour(s))   Chest XR,  PA & LAT    Narrative    XR CHEST 2 VW  9/23/2019 5:46 PM    History:  fever.     Comparison: Chest radiograph of 7/25/2019    Findings:   PA and the lateral view of the chest. Trachea is midline. Cardiac  silhouette is within normal limits. Pulmonary vasculature is  relatively distinct. No acute airspace opacities. Multiple compression  deformity of the thoracic spine, overall unchanged.      Impression    IMPRESSION:  No acute cardiopulmonary process. Multilevel thoracic spine  compression deformities.    I have personally reviewed the examination and initial interpretation  and I agree with the findings.    DEMARCO MARTINEZ MD   CT Chest w/o Contrast    Narrative    CT CHEST W/O CONTRAST  9/24/2019 2:32 PM    History:  fevers- intermittent neutropenia the last 4 months and long  standing cough- concern for fungal or atypical pneumonia.     COMPARISON: PET CT 8/23/2019, Chest CT on 7/21/2019.    TECHNIQUE: CT imaging obtained through the chest without intravenous  contrast. Coronal and axial MIP reformatted images obtained.    FINDINGS:  Central tracheobronchial tree is patent. There is no mass,  consolidation or infiltration. Right lower lobe calcified pulmonary  nodule. The previously demonstrated lingular tree-in-bud opacities  have resolved. Calcified right hilum lymph nodes. Given differences in  technique, slight improvement in dependent predominant reticulation.  No pleural effusion or pneumothorax.    Heart size is within normal limits. There is no pericardial effusion.   Normal thoracic vasculature. Blood pool density lower than myocardium,  compatible with patient's anemia. There are new enlarged mediastinal  and axillary lymph nodes with surrounding fat stranding. For example,  right paratracheal lymph node measures 1.8 cm (image 15 series 2,  image 30 series 4).  Increased bilateral axillary lymphadenopathy.    Bones and soft tissues: No suspicious bone findings. Unchanged  multi-level compression deformities.    Partially imaged upper abdomen: No acute findings. Scattered calcified  granulomas in the spleen.      Impression    IMPRESSION: New mediastinal and axillary lymphadenopathy, concerning  for lymphocytic proliferative disease, such as PTLD given patient's  history of bone marrow transplant.  1.  Interval resolution of lingular lobe tree-in-bud opacities. No  residual finding concerning for infection.   2.  Slight improvement in bilateral lower lobe predominant  reticulation, nonspecific which can be seen in atelectasis,  nonspecific interstitial pneumonitis versus medication toxicity.  3.  Blood pool density lower than myocardium, compatible with  patient's anemia.   4.  Sequelae of prior granulomatous disease.    I have personally reviewed the examination and initial interpretation  and I agree with the findings.    YAZMIN BISHOP MD

## 2019-09-26 LAB
1,3 BETA GLUCAN SER-MCNC: <31 PG/ML
ANION GAP SERPL CALCULATED.3IONS-SCNC: 10 MMOL/L (ref 3–14)
B-D GLUCAN INTERPRETATION (1,3): NEGATIVE
BASOPHILS # BLD AUTO: 0 10E9/L (ref 0–0.2)
BASOPHILS NFR BLD AUTO: 0 %
BLD PROD TYP BPU: NORMAL
BLD PROD TYP BPU: NORMAL
BLD UNIT ID BPU: 0
BLOOD PRODUCT CODE: NORMAL
BPU ID: NORMAL
BUN SERPL-MCNC: 10 MG/DL (ref 7–30)
CALCIUM SERPL-MCNC: 8.3 MG/DL (ref 8.5–10.1)
CHLORIDE SERPL-SCNC: 105 MMOL/L (ref 94–109)
CMV DNA SPEC NAA+PROBE-ACNC: NORMAL [IU]/ML
CMV DNA SPEC NAA+PROBE-LOG#: NORMAL {LOG_IU}/ML
CO2 SERPL-SCNC: 22 MMOL/L (ref 20–32)
CREAT SERPL-MCNC: 0.79 MG/DL (ref 0.66–1.25)
DIFFERENTIAL METHOD BLD: ABNORMAL
EOSINOPHIL # BLD AUTO: 0 10E9/L (ref 0–0.7)
EOSINOPHIL NFR BLD AUTO: 1.7 %
ERYTHROCYTE [DISTWIDTH] IN BLOOD BY AUTOMATED COUNT: 15 % (ref 10–15)
GALACTOMANNAN AG SERPL QL IA: NEGATIVE
GALACTOMANNAN AG SERPL-ACNC: 0.05
GFR SERPL CREATININE-BSD FRML MDRD: >90 ML/MIN/{1.73_M2}
GLUCOSE SERPL-MCNC: 106 MG/DL (ref 70–99)
HCT VFR BLD AUTO: 25.4 % (ref 40–53)
HGB BLD-MCNC: 8.2 G/DL (ref 13.3–17.7)
LACTATE BLD-SCNC: 1.4 MMOL/L (ref 0.7–2)
LYMPHOCYTES # BLD AUTO: 0.2 10E9/L (ref 0.8–5.3)
LYMPHOCYTES NFR BLD AUTO: 6 %
MACROCYTES BLD QL SMEAR: PRESENT
MCH RBC QN AUTO: 38.1 PG (ref 26.5–33)
MCHC RBC AUTO-ENTMCNC: 32.3 G/DL (ref 31.5–36.5)
MCV RBC AUTO: 118 FL (ref 78–100)
METAMYELOCYTES # BLD: 0 10E9/L
METAMYELOCYTES NFR BLD MANUAL: 0.9 %
MONOCYTES # BLD AUTO: 0 10E9/L (ref 0–1.3)
MONOCYTES NFR BLD AUTO: 0.9 %
NEUTROPHILS # BLD AUTO: 2.3 10E9/L (ref 1.6–8.3)
NEUTROPHILS NFR BLD AUTO: 90.5 %
NUM BPU REQUESTED: 1
PARASITE SPEC INSPECT: NORMAL
PLATELET # BLD AUTO: 19 10E9/L (ref 150–450)
PLATELET # BLD EST: ABNORMAL 10*3/UL
POTASSIUM SERPL-SCNC: 3.8 MMOL/L (ref 3.4–5.3)
RBC # BLD AUTO: 2.15 10E12/L (ref 4.4–5.9)
SODIUM SERPL-SCNC: 137 MMOL/L (ref 133–144)
SPECIMEN SOURCE: NORMAL
SPECIMEN SOURCE: NORMAL
TRANSFUSION STATUS PATIENT QL: NORMAL
TRANSFUSION STATUS PATIENT QL: NORMAL
WBC # BLD AUTO: 2.5 10E9/L (ref 4–11)

## 2019-09-26 PROCEDURE — 36415 COLL VENOUS BLD VENIPUNCTURE: CPT

## 2019-09-26 PROCEDURE — 25000128 H RX IP 250 OP 636: Performed by: INTERNAL MEDICINE

## 2019-09-26 PROCEDURE — 87015 SPECIMEN INFECT AGNT CONCNTJ: CPT | Performed by: PHYSICIAN ASSISTANT

## 2019-09-26 PROCEDURE — 25000128 H RX IP 250 OP 636: Performed by: PHYSICIAN ASSISTANT

## 2019-09-26 PROCEDURE — 40000556 ZZH STATISTIC PERIPHERAL IV START W US GUIDANCE

## 2019-09-26 PROCEDURE — 36415 COLL VENOUS BLD VENIPUNCTURE: CPT | Performed by: INTERNAL MEDICINE

## 2019-09-26 PROCEDURE — 25800030 ZZH RX IP 258 OP 636: Performed by: INTERNAL MEDICINE

## 2019-09-26 PROCEDURE — 87040 BLOOD CULTURE FOR BACTERIA: CPT

## 2019-09-26 PROCEDURE — 25000125 ZZHC RX 250: Performed by: PHYSICIAN ASSISTANT

## 2019-09-26 PROCEDURE — 25000132 ZZH RX MED GY IP 250 OP 250 PS 637: Performed by: PHYSICIAN ASSISTANT

## 2019-09-26 PROCEDURE — 85025 COMPLETE CBC W/AUTO DIFF WBC: CPT | Performed by: INTERNAL MEDICINE

## 2019-09-26 PROCEDURE — 83605 ASSAY OF LACTIC ACID: CPT

## 2019-09-26 PROCEDURE — 87207 SMEAR SPECIAL STAIN: CPT | Performed by: PHYSICIAN ASSISTANT

## 2019-09-26 PROCEDURE — 20600000 ZZH R&B BMT

## 2019-09-26 PROCEDURE — 25000132 ZZH RX MED GY IP 250 OP 250 PS 637: Performed by: INTERNAL MEDICINE

## 2019-09-26 PROCEDURE — 80048 BASIC METABOLIC PNL TOTAL CA: CPT | Performed by: INTERNAL MEDICINE

## 2019-09-26 PROCEDURE — 87103 BLOOD FUNGUS CULTURE: CPT | Performed by: INTERNAL MEDICINE

## 2019-09-26 RX ORDER — NICOTINE POLACRILEX 4 MG
15-30 LOZENGE BUCCAL
Status: CANCELLED | OUTPATIENT
Start: 2019-09-26

## 2019-09-26 RX ORDER — LIDOCAINE 40 MG/G
CREAM TOPICAL
Status: CANCELLED | OUTPATIENT
Start: 2019-09-26

## 2019-09-26 RX ORDER — DIPHENHYDRAMINE HCL 25 MG
25 CAPSULE ORAL EVERY 6 HOURS PRN
Status: DISCONTINUED | OUTPATIENT
Start: 2019-09-26 | End: 2019-09-29 | Stop reason: HOSPADM

## 2019-09-26 RX ORDER — DOXYCYCLINE 100 MG/10ML
100 INJECTION, POWDER, LYOPHILIZED, FOR SOLUTION INTRAVENOUS EVERY 12 HOURS
Status: DISCONTINUED | OUTPATIENT
Start: 2019-09-26 | End: 2019-09-28

## 2019-09-26 RX ORDER — DEXTROSE MONOHYDRATE 25 G/50ML
25-50 INJECTION, SOLUTION INTRAVENOUS
Status: CANCELLED | OUTPATIENT
Start: 2019-09-26

## 2019-09-26 RX ORDER — FUROSEMIDE 10 MG/ML
20 INJECTION INTRAMUSCULAR; INTRAVENOUS ONCE
Status: COMPLETED | OUTPATIENT
Start: 2019-09-26 | End: 2019-09-26

## 2019-09-26 RX ORDER — CODEINE PHOSPHATE AND GUAIFENESIN 10; 100 MG/5ML; MG/5ML
5 SOLUTION ORAL EVERY 4 HOURS PRN
Status: DISCONTINUED | OUTPATIENT
Start: 2019-09-26 | End: 2019-09-29 | Stop reason: HOSPADM

## 2019-09-26 RX ORDER — ACETAMINOPHEN 325 MG/1
650 TABLET ORAL EVERY 4 HOURS PRN
Status: DISCONTINUED | OUTPATIENT
Start: 2019-09-26 | End: 2019-09-29 | Stop reason: HOSPADM

## 2019-09-26 RX ADMIN — GUAIFENESIN AND CODEINE PHOSPHATE 5 ML: 100; 10 SOLUTION ORAL at 09:22

## 2019-09-26 RX ADMIN — DOXYCYCLINE 100 MG: 100 INJECTION, POWDER, LYOPHILIZED, FOR SOLUTION INTRAVENOUS at 13:18

## 2019-09-26 RX ADMIN — ACETAMINOPHEN 650 MG: 325 TABLET, FILM COATED ORAL at 13:10

## 2019-09-26 RX ADMIN — SODIUM CHLORIDE, PRESERVATIVE FREE 1000 ML: 5 INJECTION INTRAVENOUS at 06:03

## 2019-09-26 RX ADMIN — FUROSEMIDE 20 MG: 10 INJECTION, SOLUTION INTRAVENOUS at 10:37

## 2019-09-26 RX ADMIN — DIPHENHYDRAMINE HYDROCHLORIDE 25 MG: 25 CAPSULE ORAL at 13:10

## 2019-09-26 RX ADMIN — ACYCLOVIR 800 MG: 800 TABLET ORAL at 19:15

## 2019-09-26 RX ADMIN — PROCHLORPERAZINE MALEATE 5 MG: 5 TABLET ORAL at 20:43

## 2019-09-26 RX ADMIN — PAROXETINE HYDROCHLORIDE HEMIHYDRATE 20 MG: 20 TABLET, FILM COATED ORAL at 08:12

## 2019-09-26 RX ADMIN — Medication 600 MG: at 08:13

## 2019-09-26 RX ADMIN — ACETAMINOPHEN 650 MG: 325 TABLET, FILM COATED ORAL at 08:15

## 2019-09-26 RX ADMIN — ACETAMINOPHEN 650 MG: 325 TABLET, FILM COATED ORAL at 03:15

## 2019-09-26 RX ADMIN — Medication 600 MG: at 17:08

## 2019-09-26 RX ADMIN — MELATONIN 2000 UNITS: at 08:13

## 2019-09-26 RX ADMIN — AZITHROMYCIN 500 MG: 250 TABLET, FILM COATED ORAL at 08:13

## 2019-09-26 RX ADMIN — CEFEPIME HYDROCHLORIDE 2 G: 2 INJECTION, POWDER, FOR SOLUTION INTRAVENOUS at 03:15

## 2019-09-26 RX ADMIN — ACETAMINOPHEN 650 MG: 325 TABLET, FILM COATED ORAL at 22:01

## 2019-09-26 RX ADMIN — ACETAMINOPHEN 650 MG: 325 TABLET, FILM COATED ORAL at 17:07

## 2019-09-26 RX ADMIN — ACYCLOVIR 800 MG: 800 TABLET ORAL at 08:13

## 2019-09-26 ASSESSMENT — ACTIVITIES OF DAILY LIVING (ADL)
ADLS_ACUITY_SCORE: 10

## 2019-09-26 ASSESSMENT — MIFFLIN-ST. JEOR
SCORE: 1635.89
SCORE: 1626.36

## 2019-09-26 NOTE — PLAN OF CARE
Problem: Adult Inpatient Plan of Care  Goal: Plan of Care Review  9/26/2019 1501 by Dena Carmona, RN  Outcome: No Change   Pt continues to be febrile with sinus tachycardia. Tylenol given but did not bring his temp down at all. Very fatigued and frustrated. IV antibiotics switched from cefepime to Doxycycline. No appetite. Frequent non productive coughing, Robitussin w/codeine given. Pt feels slightly SOB with talking, wt is up 3 LBS, +1 generalized edema. BP remains slightly elevated. Lasix given with good response (I &Os are not accurate, Wt is down 2 lbs after Lasix). Maint fluid discontinued. Up to BR independently.  Plan to do lymph node biopsy when IR available.

## 2019-09-26 NOTE — PROGRESS NOTES
"BMT Daily Progress Note      Patient ID: Angel Yanez is a 58 yo man Day +132 s/p 2nd auto for IgM kappa MM      Diagnosis MM Multiple myeloma  HCT Type Autologous    Prep Regimen Cytoxan  Melphalan   Donor Source Self     No  Primary BMT Provider Dr Lucio         INTERVAL  HISTORY     Guille is tired- he is visably fatigued appearing. He also tells me is tired of all of this - recurrent hospital admission every couple of months, persistent low counts. Concern about possible cause of fevers. He also appears to be working harder to breath as we were talking- he does not that he feels sob this morning and bloated. No new localizing symptoms - no sore throat, headache, not eating much  As he just doesn't have an appetite. Also reports had about 6 small soft BMs yesterday. Denies abdominal pain.        Review of Systems: 10 point ROS negative except as noted above.     Physical Exam  Blood pressure (!) 143/89, pulse 111, temperature 101.8  F (38.8  C), temperature source Axillary, resp. rate 20, height 1.798 m (5' 10.8\"), weight 81.2 kg (179 lb), SpO2 95 %.    Wt Readings from Last 4 Encounters:   09/26/19 81.2 kg (179 lb)   09/17/19 80.2 kg (176 lb 12.8 oz)   09/10/19 79.9 kg (176 lb 1.6 oz)   09/03/19 79.2 kg (174 lb 9.6 oz)     General: NAD, pleasant  Eyes:  sclera anicteric and not injected  Nose/Mouth/Throat: OP moist, no ulcerations   Lungs: NO crackles in bases but oddly decreased breathsound in bilateral apices.   Cardiovascular: RRR, no M/R/G   Lymphatics: no edema  Skin: no rash or petechiae   Neuro: non-focal, no gross deficits  Left forearm peripheral IV.     Labs  Lab Results   Component Value Date    WBC 2.5 (L) 09/26/2019    ANEU 2.3 09/26/2019    HGB 8.2 (L) 09/26/2019    HCT 25.4 (L) 09/26/2019    PLT 19 (LL) 09/26/2019     09/26/2019    POTASSIUM 3.8 09/26/2019    CHLORIDE 105 09/26/2019    CO2 22 09/26/2019     (H) 09/26/2019    BUN 10 09/26/2019    CR 0.79 09/26/2019    MAG 2.2 " 08/23/2019    INR 1.19 (H) 09/24/2019    BILITOTAL 0.6 09/23/2019    AST 10 09/23/2019    ALT 18 09/23/2019    ALKPHOS 80 09/23/2019    PROTTOTAL 7.0 09/23/2019    ALBUMIN 3.9 09/23/2019     Assessment and Plan  Assessment and Plan  Angel Yanez is a 60 yo man Day +132 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  BMT/MM:   Slow engraftment; cell dose was only 0.639x10^6. ([facility] [place] - known poor cell dose as failed chemo-mobilization 1/2019.)   - Stringent CR.     2.  HEME: Keep Hgb>8g/dL, plt >10.   - Ongoing cytopenias - low cell dose with transplant. Pt was given GCSF 9/10 and again 9/17 with ANC = 0.6.COunts improved today- infection?    - Plts 19 k following 2 units plts yesterday.      3.  ID: Persistent high fevers -103.5  - Fevers in the setting of intermittent neutropenia: No definite localizing source. Pt has a chronic mild cough which worsened slightly with fever.   - 9/23 cxr: clear. Fever curve up so hcest CT 9/24: IMprovement in prior GGO/tree and bud opacities but new mediastinal and axillary lymphadenopathy w/ fat straining. On exam some mild right axilla, right groin and submandibular tender adenopathy. Ddx: PTLD, EBV reactivation, other   - Obtain ID consult- Appreciate recs. Many studies pending.  -9/25 Crypto neg, parasite stain negative. Asp GM neg.   -RVP positive for rhinovirus- not cause of fevers given really no cold symptoms.   -CRP is elevated at 160  -EBV quant  - IR has no availability for core bx today or tomorrow. Scheduled for 9/26/19.   - Fungitell 9/25.   - Continue cefepime/azithro  - f/u blood cultures.  UA unremarkable, and urine streptococcal and legionella antigens-negative.  - S/p  pseudomonas bacteremia 7/21/19 (BC+ from both Hsieh and periphery). H Hsieh was removed on 7/23. He had a left arm PICC inserted 7/25 through which he completed 10 days IV cefepime daily at home through 8/2. PICC was then removed on 8/2..   - Continue prophylactic ACV. Prophylactic  pentamidine was last given 9/17.  - On CMV vaccine trial.      4. GI:  New loose stool   - Stool small and soft- likely side effect of abx. IF progresses to watery will obtain cdiff.   - GI prophylaxis: omeprazole.       5.  FEN/Renal: Creat, lytes wnl.   Appetite is down - likely due to fevers. Okay for few days if persistent fevers may need to address nutritional concerns.   - Stop IVF. Give 20mg IV lasix given feeling of sob and increased work of breathing on exam.   - PRN lyte replacement per standing protocol.    6. Pulm: Not hypoxic but some SOB.   Suspect cause of increased sob is due to volume overload as weight is up. Give lasix 20mg IV x1- f/u wt and respiratory status.      7.  Mood: Continue Paxil.    Dispo: monitor fevers. Await improvement in fever curve and work up of adenopathy.     Myrna Marrufo PA-C  [phone]    ATTENDING ADDENDUM:    I have independently seen and evaluated the patient on September 24, 2019 and reviewed clinical, laboratory, and radiographic findings. I have discussed the plan with the team and agree with the attached note with the following edits:    Angel Yanez is a 59 year old year old male, with MM s/p auto #2, 4m out, recent pseudomonas BSI, here for fever while not neutropenic    Fevers are trending down this am, no associated symptoms.     Ph/E: Vitals reviewed. No distress. Oropharynx clear. Neck supple. Heart RRR. Lungs clear. Abdomen soft. No peripheral edema. No rash. Neuro nonfocal.   CT with new adenopathy mediastinum, axilla, suspect viral sy, systemic infection or PTLD.    A&P: will ask ID to see, add CRP and LDH.   consider IR guided lymph node bx if fevers persist. Try Celebrex 100mg BID. Not ill, cont  plt transfusions.ANC is recovered.   Cefepim and await blood Cx. Will stop Azithromycin tomorrow.        acyclovir  800 mg Oral BID     azithromycin  500 mg Oral Daily     calcium carbonate  600 mg Oral BID w/meals     ceFEPIme (MAXIPIME) IV  2 g Intravenous  Q8H     furosemide  20 mg Intravenous Once     PARoxetine  20 mg Oral QAM     vitamin D3  2,000 Units Oral Daily       Kody Mukherjee MD

## 2019-09-26 NOTE — CONSULTS
Patient is on IR schedule 9/30/2019 for a right axilla, core lymph node biopsy.   Labs WNL for procedure.  Orders for NPO, scrubs have been entered.    Consent will be done prior to procedure.    Adenopathy  high fevers- PTLD? EBV  Please contact the IR charge RN at [phone] for estimated time of procedure.     Discussed with Dr. Kev Irwin IR RPA  [phone] Call pager  [phone] pager

## 2019-09-26 NOTE — PLAN OF CARE
"Blood pressure (!) 151/78, pulse 109, temperature 101.1  F (38.4  C), temperature source Axillary, resp. rate 16, height 1.798 m (5' 10.8\"), weight 79.9 kg (176 lb 3.2 oz), SpO2 99 %.   Pt continue to be febrile of 101.6 to 101.1 with HR tachy and HO notified with each temp spike. No new orders given. Tylenol given and pt is not responding to it. Triggered SIRS sepsis protocol and Lactate for Sepsis was 1.4 Took a shower before going to bed. MIVF, NS 0.9/L at 100ML/hr  via left PIV. New dressing applied to IV site after shower. Pt denies pain/N/V/D. Up independently to the bathroom. Continue to monitor pt and continue with plan of care.  Problem: Adult Inpatient Plan of Care  Goal: Plan of Care Review  9/26/2019 0703 by Bonnie Burciaga RN  Outcome: No Change  9/25/2019 1725 by Rosana Esteves RN  Outcome: No Change     Problem: Fever (Fever with Neutropenia)  Goal: Baseline Body Temperature  9/26/2019 0703 by Bonnie Burciaga RN  Outcome: No Change  9/25/2019 1725 by Rosana Esteves RN  Outcome: No Change     "

## 2019-09-26 NOTE — CONSULTS
Laboratory Medicine and Pathology  Transfusion Medicine- Transfusion Reaction    Angel Yanez MRN# [id number]   YOB: 1960 Age: 59 year old   Date of Reaction: 9/25/19       Transfusion Reaction Evaluation   Impression  Although patients fever meets the National Healthcare Safety Network criteria for a febrile non-hemolytic transfusion reaction  (fever greater than 100.4 oral and a change of at least 1.8 from pre-transfusion value or chills/rigors during or within four hours of cessation of transfusion). However, the underlying condition is the favored etiology given his  neutropenic status and labile temperatures during this admission, however transfusion cannot be entirely excluded.    Final culture results are pending.    Recommendation    Transfuse as needed.       ----------------------------------    History  Angel Nova a 59 year old male with a history of IgG kappa multiple myeloma  and is +131 days s/p 2nd auto PBSCT. He was admitted for high fevers despite IV antibiotics. He is persistent febrile with fevers trending up to 103.7 and has intermittent neutropenia, his current WBC count is 2.5. Cultures have been negative so far. The patient had multiple hospital admission for his low counts previously and per note has a slow engraftment.     On September 25, 2019 he received two unit of platelets for a platelet count of 8, beginning at 07:58 to 12:50.  The first unit was transfused from 07:58 to 09:28 and second unit was transfused from 11:27 to 12:50. At the end of transfusion around 12:50 patient developed fever, chills and rigors with a temperature max of 103.5 (at 14:25) from a baseline of 100.9 at the initiation of transfusion. He was treated with tylenol.     Reported Symptoms  Chills, Rigors and fever    Vital Signs (From EPIC Blood Administration Flowsheet)  Pre Transfusion 07:58   Temp 100.9, HR NA, /75, RR NA, SpO2 94% room air    During Transfusion 11:04               Temp 97.4, HR 92, /61, RR 18, SpO2 95% room air    Post Transfusion 12:50   Temp 101.4, , /76, RR 18, SpO2  94% room air    Transfusion History: RBCs and Platelets  Transfusion Reaction History: None known  Antibody Screen History: Positive (nato has received daratumumab)    Blood Bank Investigation  Product Type: Platelets  Unit Number: [id number], [id number]  Amount Remainin mL.  Post-Transfusion Clerical Check: Correct  ABO/Rh: The unit type was O positive ([id number]) and AB positive  ([id number]) and the patient was O positive. The unit was compatible.  ROMMEL: Negative  Post-Transfusion Plasma: straw colored    The unit was cultured and Gram stain was performed after adding 10 ml of saline prior to inoculating the blood culture bottles. Gram stain showed no organisms and culture is no growth to date, pending final.    [facility]  Case Definition:  Possible Febrile non hemolytic transfusion reaction  Severity: Non severe  Imputability: Possible    RACHEL Scruggs  Transfusion medicine resident  [phone]      ATTENDING ATTESTATION:  I have personally reviewed the clinical and laboratory features of the reported transfusion reaction. I have discussed the patient with the Pathology Resident, Dr. Last Noland, and I agree with the comments in her note above.    The patient is a 59 year old male with multiple myeloma S/P autologous stem cell transplant, admitted with fevers and found to have new adenopathy.  He was transfused 2 units of platelets and developed a rise in temperature and chills and rigors.   The reaction meets the definition of a non-severe febrile non-hemolytic transfusion reaction.  The imputability is only possible, as a contribution from the patient's underlying medical condition can not be excluded.  Recommend transfuse as needed.    Christie Santillan M.D., Ph.D.  Attending Physician  Division of Transfusion Medicine  Department of Laboratory Medicine  [place] [address]  Pager [phone]

## 2019-09-26 NOTE — PROGRESS NOTES
"[facility]  Transplant Infectious Disease Progress Note       Patient:  Angel Yanez, Date of birth 1960, Medical record number [id number]  Date of Visit:  09/26/2019         Assessment and Recommendations:   Recommendations:  - follow up multiple pending microbiologic diagnostics  - continue IV cefepime for now  - agree with holding azithromycin and giving doxycycline 100mg BID instead  - continue acyclovir for prophylaxis for now  - would encourage pursuing biopsy of a lymph node: send for gram stain and culture, von and fungal culture, AFB stain and AFB culture as well as path/cytology/EBV JEN    Thank you very much for this consultation. Transplant Infectious Disease will continue to follow with you.     Assessment:   60 yo male who is s/p autologous PBSCT (Cytoxan/Mephalan) on 5/17/19 for MM, port-related Pseudomonas bacteremia 7/21 (Hsieh removed, treated with 10 days of IV cefepime) who is admitted 9/23/19 with fevers, new axillary/mediastinal lymphadenopathy and chronic dry cough without other localizable symptoms.      # Fever  # Lymphadenopathy - most prominent mediastinal and axillary   # Hx of PBSCT on 5/17/19 with poor cell dose and intermittent neutropenia / lymphopenia since transplant   Patient presents with abrupt onset of high fever without localizable symptoms and found to have new mediastinal and axillary lymphadenopathy. He has had minimal improvement with IV cefepime after almost 48 hours of treatment and his procalcitonin is negative arguing somewhat against typical bacterial etiologies, although some bacterial etiologies are considered--as well as are some viral and fungal.      Unclear whether recent trip to [place] and recent \"cold\" (which he reports recovering from) are related to current presentation. Viral infection such as CMV is considered. Endemic fungi such as Histoplasma, Cryptococcus should be considered. Considering no nodules/infiltrate on " chest CT Aspergillus unlikely (as well as he has not been so profoundly neutropenic to put him at highest risk). As he lives in [place] and has a dog tick-borne diseases enter the differential and we are assessing for Anaplasma, Babesia (presentation does not fit as well with Lyme). His dog also puts him at risk for Leptospirosis as well. I do not find any prior checks for latent TB and we can check a quantiferon (although he is low risk and dose not require airborne isolation). He has been in contact with birds and we can check Chlamydia group antibodies. We can check Bartonella antibodies as well.      Other things on the differential for new lymphadenopathy, however considered less likely (for various reasons, in some cases including lack of known exposures or lack of supporting signs/symptoms) include acute HIV infection, Diptheria (less likely considering pt received vaccine, pt has been receiving azithromycin which is typical treatment) Tularemia, Brucellosis, secondary syphilis, Toxoplasma.  PTLD is also consideration however unusual for auto PBSCT, EBV viral load pending as well.      Additional Infectious Disease issues include:  - QTc interval: 451 on 8/23/19 (pt has allergy to moxifloxacin)  - Bacterial prophylaxis:  Currently on cefepime   - Pneumocystis prophylaxis: none  - Viral serostatus & prophylaxis: CMV+ EBV + HSV + on prophylactic acyclovir   - Fungal prophylaxis: none currently (discontinue on 9/27/19)   - Immunization status:  Unknown   - Gamma globulin status: IgG was 824 on 8/23/19      Carmen Cisneros MD  Infectious Diseases Fellow         History of Infectious Disease Illness:   Feeling fatigued this morning and frustrated with lack of improvement.   Still having fevers/chills.   Had 5-6 soft bowel movements yesterday, not watery or bloody. Denies abdominal pain / cramping.   Persistent dry cough is very bothersome.   Denies headache, rash, SOB, chest pain.            Current Medications &  Allergies:       acyclovir  800 mg Oral BID     azithromycin  500 mg Oral Daily     calcium carbonate  600 mg Oral BID w/meals     ceFEPIme (MAXIPIME) IV  2 g Intravenous Q8H     PARoxetine  20 mg Oral QAM     vitamin D3  2,000 Units Oral Daily       Infusions/Drips:    sodium chloride 1,000 mL (09/26/19 0603)            Physical Exam:     Patient Vitals for the past 24 hrs:   BP Temp Temp src Pulse Resp SpO2 Weight   09/26/19 0923 -- 102.8  F (39.3  C) Axillary -- -- -- --   09/26/19 0815 (!) 143/89 101.8  F (38.8  C) Axillary 111 20 95 % 81.2 kg (179 lb)   09/26/19 0320 (!) 151/78 101.1  F (38.4  C) Axillary -- 16 99 % --   09/26/19 0025 (!) 150/74 101.6  F (38.7  C) Axillary -- 16 93 % --   09/25/19 2240 128/73 103.2  F (39.6  C) Axillary -- 16 93 % --   09/25/19 2000 114/69 101.4  F (38.6  C) Axillary 109 16 93 % --   09/25/19 1625 100/62 101.5  F (38.6  C) Axillary -- 18 95 % --   09/25/19 1425 109/64 103.5  F (39.7  C) Axillary 124 18 92 % --   09/25/19 1250 (!) 152/76 101.4  F (38.6  C) Axillary 117 18 94 % --   09/25/19 1139 111/66 99.6  F (37.6  C) Axillary 90 16 97 % --   09/25/19 1127 109/59 99.2  F (37.3  C) Axillary -- 16 98 % --     Ranges for vital signs:  Temp:  [99.2  F (37.3  C)-103.5  F (39.7  C)] 102.8  F (39.3  C)  Pulse:  [] 111  Heart Rate:  [] 107  Resp:  [16-20] 20  BP: (100-152)/(59-89) 143/89  SpO2:  [92 %-99 %] 95 %  Vitals:    09/24/19 0000 09/25/19 0757 09/26/19 0815   Weight: 79.2 kg (174 lb 9.6 oz) 79.9 kg (176 lb 3.2 oz) 81.2 kg (179 lb)       Physical Examination:  GENERAL:  well-developed, well-nourished, in bed fatigued appearing  HEAD:  Head is normocephalic, atraumatic   EYES:  Eyes have anicteric sclerae without conjunctival injection   ENT:  Oropharynx is moist without exudates or ulcers. Tongue is midline  NECK:  Supple. No cervical lymphadenopathy  LUNGS:  Clear to auscultation bilaterally.   CARDIOVASCULAR:  Regular rate and rhythm with no murmurs, gallops or  rubs.  ABDOMEN:  Normal bowel sounds, soft, nontender. No appreciable hepatosplenomegaly.  SKIN:  No acute rashes.  PIV in place without any surrounding erythema or exudate.  NEUROLOGIC:  Grossly nonfocal. Active x4 extremities  Lymph: + mild axillary lymphadnopathy. No cervical or supraclavicular lymphadenopathy          Laboratory Data:     Inflammatory Markers    Recent Labs   Lab Test 09/25/19  1022   .0*       Immune Globulin Studies     Recent Labs   Lab Test 09/25/19  1018 08/23/19  1301 06/11/19  0918 05/06/19  0936 01/29/19  0810 01/14/19  0945   * 824 572* 636* 826 864   IGM  --  25* 20* 26* 43* 50*   IGE  --   --   --  3  --  6   IGA  --  15* 41* 45* 311 327       Metabolic Studies       Recent Labs   Lab Test 09/26/19  0409 09/25/19  1355 09/25/19  0301 09/25/19  0258 09/24/19  1618 09/24/19  0149 09/23/19  1739  08/23/19  1301     --  135  --   --  138  --    < > 139   POTASSIUM 3.8 3.7 3.2*  --   --  3.7  --    < > 3.9   CHLORIDE 105  --  104  --   --  105  --    < > 107   CO2 22  --  23  --   --  27  --    < > 28   ANIONGAP 10  --  9  --   --  6  --    < > 4   BUN 10  --  10  --   --  10  --    < > 14   CR 0.79  --  0.83  --   --  0.85  --    < > 0.73   GFRESTIMATED >90  --  >90  --   --  >90  --    < > >90   *  --  116*  --   --  134*  --    < > 85   ZHEN 8.3*  --  7.9*  --   --  8.0*  --    < > 8.8   PHOS  --   --   --   --   --   --   --   --  3.6   MAG  --   --   --   --   --   --   --   --  2.2   LACT  --   --   --  0.9  --   --  1.4  --   --    PCAL  --   --   --   --   --  0.27  --   --   --    FGTL  --   --   --   --  <31  --   --   --   --     < > = values in this interval not displayed.       Hepatic Studies    Recent Labs   Lab Test 09/25/19  1022 09/23/19  1737 09/03/19  0931 08/23/19  1301  06/03/19  0305   BILITOTAL  --  0.6 0.4 0.4   < > 0.2   DBIL  --   --   --   --   --  <0.1   ALKPHOS  --  80 76 80   < > 55   PROTTOTAL  --  7.0 7.1 7.3   < > 5.7*   ALBUMIN   --  3.9 3.8 4.1   < > 2.6*   AST  --  10 16 18   < > 18   ALT  --  18 15 20   < > 23   *  --   --  200  --   --     < > = values in this interval not displayed.     Gout Labs      Recent Labs   Lab Test 08/23/19  1301 05/16/19  1140 05/06/19  0936 01/23/19  0259 01/14/19  0945   URIC 4.2 4.6 4.6 4.4 4.2       Hematology Studies      Recent Labs   Lab Test 09/26/19  0409  09/25/19  0301 09/24/19  0149 09/23/19  1737 09/17/19  0744 09/10/19  0731   WBC 2.5*  --  2.5* 2.6* 2.8* 2.2* 2.3*   ANEU 2.3  --  2.0 1.6 1.5* 0.6* 0.9*   ALYM 0.2*  --  0.3* 0.5* 0.6* 1.0 0.9   NEGAR 0.0  --  0.1 0.5 0.6 0.5 0.4   AEOS 0.0  --  0.0 0.1 0.2 0.2 0.1   HGB 8.2*  --  8.5* 8.1* 9.8* 9.7* 9.8*   HCT 25.4*  --  26.3* 24.0* 30.4* 28.9* 29.3*   PLT 19*   < > 8* 16* 18* 22* 21*    < > = values in this interval not displayed.       Clotting Studies    Recent Labs   Lab Test 09/24/19  0149  07/22/19  0009 06/03/19  0305 05/29/19  1401   INR 1.19*  --  1.16* 1.10 1.14   PTT 42*   < > 74*  --  44*    < > = values in this interval not displayed.       Iron Testing    Recent Labs   Lab Test 09/26/19  0409   *     Arterial Blood Gas Testing    Recent Labs   Lab Test 01/14/19  1029   PH 7.43   PCO2 36   PO2 90   HCO3 23   O2PER 21      Urine Studies     Recent Labs   Lab Test 09/25/19  0822 09/23/19  1757 07/21/19 2013 05/30/19  2228 05/26/19  2104 05/06/19  0936   URINEPH 7.5* 6.0 6.0 6.0 6.0 6.0   NITRITE Negative Negative Negative Negative Negative Negative   LEUKEST Negative Negative Negative Negative Negative Negative   WBCU <1 1 1 1  --  1     Body fluid stats    Recent Labs   Lab Test 09/25/19  1331   GS No organisms seen  Called to  Bradley Cannon, 1515 9/25/19 TM.         Microbiology:  Fungal testing  Recent Labs   Lab Test 09/24/19  1618 06/02/19  1045   FGTL <31 <31   ASPGAI  --  0.04   ASPGAA  --  Negative       Last Culture results with specimen source  Culture Micro   Date Value Ref Range Status   09/25/2019 Culture  negative monitoring continues  Preliminary   09/25/2019 Culture negative monitoring continues  Preliminary   09/25/2019 No growth after 1 day  Preliminary   09/25/2019 No growth after 1 day  Preliminary   09/24/2019 No growth after 2 days  Preliminary   09/24/2019 No growth after 2 days  Preliminary   09/24/2019 Canceled, Test credited  Duplicate request    Final   09/24/2019 Culture negative monitoring continues  Preliminary   09/23/2019 No growth after 3 days  Preliminary   09/23/2019 No growth after 2 days  Preliminary   09/23/2019 No growth after 2 days  Preliminary   07/25/2019 No growth  Final   07/25/2019 No growth  Final   07/24/2019 No growth  Final   07/23/2019 No growth  Final   07/23/2019 (A)  Final    Cultured on the 1st day of incubation:  Pseudomonas aeruginosa     07/23/2019   Final    Critical Value/Significant Value, preliminary result only, called to and read back by   Latrice Miranda RN. 7/23/19 @ 2112, [facility]     07/23/2019 Susceptibility testing done on previous specimen  Final   07/22/2019 No VRE isolated  Final    Specimen Description   Date Value Ref Range Status   09/26/2019 Blood  Final   09/25/2019 Blood  Final   09/25/2019 Other Transfusion Reaction Donor Unit  Final   09/25/2019 Other Transfusion Reaction Donor Unit  Final   09/25/2019 Other Transfusion Reaction Donor Unit  Final   09/25/2019 Other Transfusion Reaction Donor Unit  Final   09/25/2019 Blood Right Arm  Final   09/25/2019 Blood Right Arm  Final   09/24/2019 Blood  Final   09/24/2019 Blood Right Arm  Final   09/24/2019 Swab  Final   09/24/2019 Swab Rectal  Final   09/24/2019 Catheterized Urine  Final   09/23/2019 Blood Left Arm  Final   09/23/2019 Urine  Final   09/23/2019 Blood Right Arm  Final   09/23/2019 Blood Left Arm  Final   07/25/2019 Blood gray port  Final   07/25/2019 Blood Right Arm  Final        Last check of C difficile  C Diff Toxin B PCR   Date Value Ref Range Status   05/24/2019 Negative NEG^Negative Final      Comment:     Negative: Clostridium difficile target DNA sequences NOT detected, presumed   negative for Clostridium difficile toxin B or the number of bacteria present   may be below the limit of detection for the test.  FDA approved assay performed using [employer] GeneXpert real-time PCR.  A negative result does not exclude actual disease due to Clostridium difficile   and may be due to improper collection, handling and storage of the specimen   or the number of organisms in the specimen is below the detection limit of the   assay.         Syphilis Testing    Rapid Plasma Reagin   Date Value Ref Range Status   01/05/2005 Negative NEG Final       Quantiferon testing   No lab results found.    Virology:  CMV viral loads    Recent Labs   Lab Test 08/23/19  1302 08/06/19  1119 07/15/19  0955 07/08/19  1203 07/01/19  0854   CSPEC Plasma, EDTA anticoagulant Plasma, EDTA anticoagulant Plasma, EDTA anticoagulant Plasma, EDTA anticoagulant Plasma, EDTA anticoagulant   CMVLOG Not Calculated Not Calculated Not Calculated Not Calculated Not Calculated       Log IU/mL of CMVQNT   Date Value Ref Range Status   08/23/2019 Not Calculated <2.1 [Log_IU]/mL Final   08/06/2019 Not Calculated <2.1 [Log_IU]/mL Final   07/15/2019 Not Calculated <2.1 [Log_IU]/mL Final   07/08/2019 Not Calculated <2.1 [Log_IU]/mL Final   07/01/2019 Not Calculated <2.1 [Log_IU]/mL Final   06/24/2019 Not Calculated <2.1 [Log_IU]/mL Final   06/17/2019 Not Calculated <2.1 [Log_IU]/mL Final   06/07/2019 Not Calculated <2.1 [Log_IU]/mL Final   05/29/2019 Not Calculated <2.1 [Log_IU]/mL Final   05/17/2019 Not Calculated <2.1 [Log_IU]/mL Final      Hepatitis C Antibody   Date Value Ref Range Status   02/24/2006 Negative NEG Final   02/04/2005 Negative NEG Final       CMV IgG Antibody   Date Value Ref Range Status   01/05/2005 >160.0 EU/mL Final     Comment:     Positive for anti-CMV IgG     Herpes Simplex IgG Antibody Immune Status Ratio   Date Value Ref Range  Status   02/04/2005 1.21  Final     Herpes Simplex IgG Antibody Interpretation   Date Value Ref Range Status   02/04/2005 Positive, suggests immunologic exposure.  Final     EBV Capsid Antibody IgG   Date Value Ref Range Status   05/06/2019 >8.0 (H) 0.0 - 0.8 AI Final     Comment:     Positive, suggests recent or past exposure  Antibody index (AI) values reflect qualitative changes in antibody   concentration that cannot be directly associated with clinical condition or   disease state.     01/14/2019 >8.0 (H) 0.0 - 0.8 AI Final     Comment:     Positive, suggests recent or past exposure  Antibody index (AI) values reflect qualitative changes in antibody   concentration that cannot be directly associated with clinical condition or   disease state.       Herpes Simplex Virus Type 1 IgG   Date Value Ref Range Status   05/06/2019 <0.2 0.0 - 0.8 AI Final     Comment:     No HSV-1 IgG antibodies detected.  Antibody index (AI) values reflect qualitative changes in antibody   concentration that cannot be directly associated with clinical condition or   disease state.     01/14/2019 <0.2 0.0 - 0.8 AI Final     Comment:     No HSV-1 IgG antibodies detected.  Antibody index (AI) values reflect qualitative changes in antibody   concentration that cannot be directly associated with clinical condition or   disease state.       Herpes Simplex Virus Type 2 IgG   Date Value Ref Range Status   05/06/2019 0.7 0.0 - 0.8 AI Final     Comment:     No HSV-2 IgG antibodies detected.  Antibody index (AI) values reflect qualitative changes in antibody   concentration that cannot be directly associated with clinical condition or   disease state.     01/14/2019 5.7 (H) 0.0 - 0.8 AI Final     Comment:     Positive.  IgG antibody to HSV-2 detected.  Antibody index (AI) values reflect qualitative changes in antibody   concentration that cannot be directly associated with clinical condition or   disease state.         Imaging:  Recent Results  (from the past 48 hour(s))   CT Chest w/o Contrast    Narrative    CT CHEST W/O CONTRAST  9/24/2019 2:32 PM    History:  fevers- intermittent neutropenia the last 4 months and long  standing cough- concern for fungal or atypical pneumonia.     COMPARISON: PET CT 8/23/2019, Chest CT on 7/21/2019.    TECHNIQUE: CT imaging obtained through the chest without intravenous  contrast. Coronal and axial MIP reformatted images obtained.    FINDINGS:  Central tracheobronchial tree is patent. There is no mass,  consolidation or infiltration. Right lower lobe calcified pulmonary  nodule. The previously demonstrated lingular tree-in-bud opacities  have resolved. Calcified right hilum lymph nodes. Given differences in  technique, slight improvement in dependent predominant reticulation.  No pleural effusion or pneumothorax.    Heart size is within normal limits. There is no pericardial effusion.   Normal thoracic vasculature. Blood pool density lower than myocardium,  compatible with patient's anemia. There are new enlarged mediastinal  and axillary lymph nodes with surrounding fat stranding. For example,  right paratracheal lymph node measures 1.8 cm (image 15 series 2,  image 30 series 4). Increased bilateral axillary lymphadenopathy.    Bones and soft tissues: No suspicious bone findings. Unchanged  multi-level compression deformities.    Partially imaged upper abdomen: No acute findings. Scattered calcified  granulomas in the spleen.      Impression    IMPRESSION: New mediastinal and axillary lymphadenopathy, concerning  for lymphocytic proliferative disease, such as PTLD given patient's  history of bone marrow transplant.  1.  Interval resolution of lingular lobe tree-in-bud opacities. No  residual finding concerning for infection.   2.  Slight improvement in bilateral lower lobe predominant  reticulation, nonspecific which can be seen in atelectasis,  nonspecific interstitial pneumonitis versus medication toxicity.  3.   Blood pool density lower than myocardium, compatible with  patient's anemia.   4.  Sequelae of prior granulomatous disease.    I have personally reviewed the examination and initial interpretation  and I agree with the findings.    YAZMIN BISHOP MD

## 2019-09-27 LAB
ANION GAP SERPL CALCULATED.3IONS-SCNC: 8 MMOL/L (ref 3–14)
B QUINTANA IGG TITR SER: NORMAL {TITER}
B QUINTANA IGM TITR SER: NORMAL {TITER}
BASOPHILS # BLD AUTO: 0 10E9/L (ref 0–0.2)
BASOPHILS NFR BLD AUTO: 0 %
BUN SERPL-MCNC: 11 MG/DL (ref 7–30)
CALCIUM SERPL-MCNC: 8.5 MG/DL (ref 8.5–10.1)
CHLORIDE SERPL-SCNC: 104 MMOL/L (ref 94–109)
CO2 SERPL-SCNC: 22 MMOL/L (ref 20–32)
CREAT SERPL-MCNC: 0.8 MG/DL (ref 0.66–1.25)
DIFFERENTIAL METHOD BLD: ABNORMAL
EBV DNA # SPEC NAA+PROBE: 2215 {COPIES}/ML
EBV DNA SPEC NAA+PROBE-LOG#: 3.3 {LOG_COPIES}/ML
EOSINOPHIL # BLD AUTO: 0.1 10E9/L (ref 0–0.7)
EOSINOPHIL NFR BLD AUTO: 1.7 %
ERYTHROCYTE [DISTWIDTH] IN BLOOD BY AUTOMATED COUNT: 14.5 % (ref 10–15)
GAMMA INTERFERON BACKGROUND BLD IA-ACNC: 0.77 IU/ML
GFR SERPL CREATININE-BSD FRML MDRD: >90 ML/MIN/{1.73_M2}
GLUCOSE SERPL-MCNC: 106 MG/DL (ref 70–99)
HCT VFR BLD AUTO: 24.3 % (ref 40–53)
HGB BLD-MCNC: 8.1 G/DL (ref 13.3–17.7)
LACTATE BLD-SCNC: 1.7 MMOL/L (ref 0.7–2)
LYMPHOCYTES # BLD AUTO: 0.3 10E9/L (ref 0.8–5.3)
LYMPHOCYTES NFR BLD AUTO: 9.6 %
M TB IFN-G BLD-IMP: NEGATIVE
M TB IFN-G CD4+ BCKGRND COR BLD-ACNC: >10 IU/ML
MAGNESIUM SERPL-MCNC: 1.7 MG/DL (ref 1.6–2.3)
MCH RBC QN AUTO: 37.7 PG (ref 26.5–33)
MCHC RBC AUTO-ENTMCNC: 33.3 G/DL (ref 31.5–36.5)
MCV RBC AUTO: 113 FL (ref 78–100)
MITOGEN IGNF BCKGRD COR BLD-ACNC: 0.05 IU/ML
MITOGEN IGNF BCKGRD COR BLD-ACNC: 0.06 IU/ML
MONOCYTES # BLD AUTO: 0.1 10E9/L (ref 0–1.3)
MONOCYTES NFR BLD AUTO: 4.3 %
NEUTROPHILS # BLD AUTO: 2.7 10E9/L (ref 1.6–8.3)
NEUTROPHILS NFR BLD AUTO: 84.4 %
OVALOCYTES BLD QL SMEAR: SLIGHT
PHOSPHATE SERPL-MCNC: 2.2 MG/DL (ref 2.5–4.5)
PLATELET # BLD AUTO: 12 10E9/L (ref 150–450)
POIKILOCYTOSIS BLD QL SMEAR: SLIGHT
POTASSIUM SERPL-SCNC: 3.2 MMOL/L (ref 3.4–5.3)
POTASSIUM SERPL-SCNC: 3.5 MMOL/L (ref 3.4–5.3)
RBC # BLD AUTO: 2.15 10E12/L (ref 4.4–5.9)
SODIUM SERPL-SCNC: 134 MMOL/L (ref 133–144)
WBC # BLD AUTO: 3.2 10E9/L (ref 4–11)

## 2019-09-27 PROCEDURE — 25000125 ZZHC RX 250: Performed by: PHYSICIAN ASSISTANT

## 2019-09-27 PROCEDURE — 36415 COLL VENOUS BLD VENIPUNCTURE: CPT | Performed by: INTERNAL MEDICINE

## 2019-09-27 PROCEDURE — 84100 ASSAY OF PHOSPHORUS: CPT | Performed by: INTERNAL MEDICINE

## 2019-09-27 PROCEDURE — 85025 COMPLETE CBC W/AUTO DIFF WBC: CPT | Performed by: INTERNAL MEDICINE

## 2019-09-27 PROCEDURE — 25800030 ZZH RX IP 258 OP 636: Performed by: INTERNAL MEDICINE

## 2019-09-27 PROCEDURE — 80048 BASIC METABOLIC PNL TOTAL CA: CPT | Performed by: INTERNAL MEDICINE

## 2019-09-27 PROCEDURE — 36415 COLL VENOUS BLD VENIPUNCTURE: CPT

## 2019-09-27 PROCEDURE — 25000132 ZZH RX MED GY IP 250 OP 250 PS 637: Performed by: INTERNAL MEDICINE

## 2019-09-27 PROCEDURE — 25000125 ZZHC RX 250: Performed by: INTERNAL MEDICINE

## 2019-09-27 PROCEDURE — 84132 ASSAY OF SERUM POTASSIUM: CPT

## 2019-09-27 PROCEDURE — 83605 ASSAY OF LACTIC ACID: CPT

## 2019-09-27 PROCEDURE — 20600000 ZZH R&B BMT

## 2019-09-27 PROCEDURE — 83735 ASSAY OF MAGNESIUM: CPT | Performed by: INTERNAL MEDICINE

## 2019-09-27 PROCEDURE — 25000132 ZZH RX MED GY IP 250 OP 250 PS 637: Performed by: PHYSICIAN ASSISTANT

## 2019-09-27 PROCEDURE — 87040 BLOOD CULTURE FOR BACTERIA: CPT

## 2019-09-27 PROCEDURE — 25000128 H RX IP 250 OP 636: Performed by: PHYSICIAN ASSISTANT

## 2019-09-27 RX ORDER — FUROSEMIDE 10 MG/ML
20 INJECTION INTRAMUSCULAR; INTRAVENOUS ONCE
Status: COMPLETED | OUTPATIENT
Start: 2019-09-27 | End: 2019-09-27

## 2019-09-27 RX ORDER — CELECOXIB 100 MG/1
100 CAPSULE ORAL ONCE
Status: COMPLETED | OUTPATIENT
Start: 2019-09-27 | End: 2019-09-27

## 2019-09-27 RX ADMIN — ACYCLOVIR 800 MG: 800 TABLET ORAL at 07:58

## 2019-09-27 RX ADMIN — DOXYCYCLINE 100 MG: 100 INJECTION, POWDER, LYOPHILIZED, FOR SOLUTION INTRAVENOUS at 00:47

## 2019-09-27 RX ADMIN — Medication 600 MG: at 18:26

## 2019-09-27 RX ADMIN — ACETAMINOPHEN 650 MG: 325 TABLET, FILM COATED ORAL at 05:18

## 2019-09-27 RX ADMIN — PAROXETINE HYDROCHLORIDE HEMIHYDRATE 20 MG: 20 TABLET, FILM COATED ORAL at 07:58

## 2019-09-27 RX ADMIN — DOXYCYCLINE 100 MG: 100 INJECTION, POWDER, LYOPHILIZED, FOR SOLUTION INTRAVENOUS at 12:46

## 2019-09-27 RX ADMIN — FUROSEMIDE 20 MG: 10 INJECTION, SOLUTION INTRAVENOUS at 10:27

## 2019-09-27 RX ADMIN — CELECOXIB 100 MG: 100 CAPSULE ORAL at 16:26

## 2019-09-27 RX ADMIN — Medication 600 MG: at 07:58

## 2019-09-27 RX ADMIN — POTASSIUM PHOSPHATE, MONOBASIC AND POTASSIUM PHOSPHATE, DIBASIC 15 MMOL: 224; 236 INJECTION, SOLUTION INTRAVENOUS at 07:56

## 2019-09-27 RX ADMIN — POTASSIUM CHLORIDE 40 MEQ: 750 TABLET, EXTENDED RELEASE ORAL at 06:05

## 2019-09-27 RX ADMIN — POTASSIUM CHLORIDE 20 MEQ: 750 TABLET, EXTENDED RELEASE ORAL at 08:13

## 2019-09-27 RX ADMIN — ACETAMINOPHEN 650 MG: 325 TABLET, FILM COATED ORAL at 12:43

## 2019-09-27 RX ADMIN — ACYCLOVIR 800 MG: 800 TABLET ORAL at 20:29

## 2019-09-27 RX ADMIN — MELATONIN 2000 UNITS: at 07:58

## 2019-09-27 ASSESSMENT — ACTIVITIES OF DAILY LIVING (ADL)
ADLS_ACUITY_SCORE: 10

## 2019-09-27 ASSESSMENT — PAIN DESCRIPTION - DESCRIPTORS: DESCRIPTORS: ACHING;HEADACHE

## 2019-09-27 ASSESSMENT — MIFFLIN-ST. JEOR: SCORE: 1623.95

## 2019-09-27 NOTE — PROGRESS NOTES
"BMT Daily Progress Note      Patient ID: Angel Yanez is a 58 yo man Day +133 s/p 2nd auto for IgM kappa MM      Diagnosis MM Multiple myeloma  HCT Type Autologous    Prep Regimen Cytoxan  Melphalan   Donor Source Self     No  Primary BMT Provider Dr Lucio         INTERVAL  HISTORY     Guille is still febrile but looks a bit better this morning. He tells me he feels a better today than he has the last few. May also be just that he happens to be afebrile at the time of my visit - however he did have a fever all day yesterday. New complaint of headache behind left eye. No hx of migraine. Denies any visual symptoms. He wonders if he has sinus infection- no tooth or facial pain, no sinus congestion. Still appetite is down from baseline. Breathing is much more comfortable today - willing to do more lasix.      Review of Systems: 10 point ROS negative except as noted above.     Physical Exam  Blood pressure 103/64, pulse 98, temperature 100  F (37.8  C), temperature source Axillary, resp. rate 18, height 1.798 m (5' 10.8\"), weight 80 kg (176 lb 5.9 oz), SpO2 94 %.    Wt Readings from Last 4 Encounters:   09/27/19 80 kg (176 lb 5.9 oz)   09/17/19 80.2 kg (176 lb 12.8 oz)   09/10/19 79.9 kg (176 lb 1.6 oz)   09/03/19 79.2 kg (174 lb 9.6 oz)     General: NAD, pleasant  Eyes:  sclera anicteric and not injected  Nose/Mouth/Throat: OP moist, no ulcerations   Lungs: CTAB  Cardiovascular: RRR, no M/R/G   Lymphatics: no edema  Skin: no rash or petechiae   Neuro: non-focal, no gross deficits  Left forearm peripheral IV.     Labs  Lab Results   Component Value Date    WBC 3.2 (L) 09/27/2019    ANEU 2.7 09/27/2019    HGB 8.1 (L) 09/27/2019    HCT 24.3 (L) 09/27/2019    PLT 12 (LL) 09/27/2019     09/27/2019    POTASSIUM 3.2 (L) 09/27/2019    CHLORIDE 104 09/27/2019    CO2 22 09/27/2019     (H) 09/27/2019    BUN 11 09/27/2019    CR 0.80 09/27/2019    MAG 1.7 09/27/2019    INR 1.19 (H) 09/24/2019    BILITOTAL 0.6 " 09/23/2019    AST 10 09/23/2019    ALT 18 09/23/2019    ALKPHOS 80 09/23/2019    PROTTOTAL 7.0 09/23/2019    ALBUMIN 3.9 09/23/2019     Assessment and Plan  Assessment and Plan  Angel Yanez is a 60 yo man Day +133 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  BMT/MM:   Slow engraftment; cell dose was only 0.639x10^6. ([facility] [place] - known poor cell dose as failed chemo-mobilization 1/2019.)   - Stringent CR.     2.  HEME: Keep Hgb>8g/dL, plt >10.   - Ongoing cytopenias - low cell dose with transplant. Pt was given GCSF 9/10 and again 9/17 with ANC = 0.6.COunts improved today- infection?    - Plts 12 k      3.  ID: Persistent high fevers -103.1 but afebrile this morning  - No improvement in fevers on cefepime/azithro- 9/26 switch to doxycycline 100mg IV q12. Fever curve appears better.   Ddx: viral vs improvement with doxy.   - Monitor today to see if fevers are truly improving or if he spikes again.   - Fevers in the setting of intermittent neutropenia: No definite localizing source.  On exam some mild right axilla, right groin and submandibular tender adenopathy. Ddx: PTLD, EBV reactivation, other   - Obtain ID consult- Appreciate recs. Many studies pending.  -9/25 Crypto neg, parasite stain negative. Asp GM/fungitell neg, EBV <1000.   -RVP positive for rhinovirus- not cause of fevers given really no cold symptoms.   -CRP is elevated at 160  - IR has no availability for core bx today or tomorrow. Scheduled for 9/30/19. May not need this if fevers resolve.     - f/u blood cultures.  UA unremarkable, and urine streptococcal and legionella antigens-negative.  - S/p  pseudomonas bacteremia 7/21/19 (BC+ from both Hsieh and periphery). H Hsieh was removed on 7/23. He had a left arm PICC inserted 7/25 through which he completed 10 days IV cefepime daily at home through 8/2. PICC was then removed on 8/2..   - Continue prophylactic ACV. Prophylactic pentamidine was last given 9/17.  - On CMV vaccine trial.      4.  GI: no complaints.   - GI prophylaxis: omeprazole.       5.  FEN/Renal: Creat, lytes wnl.   Appetite is down - likely due to fevers. Okay for few days if persistent fevers may need to address nutritional concerns.   -  Repeat 20mg IV lasix.   - PRN lyte replacement per standing protocol.    6. Pulm: Not hypoxic but some SOB  - Improved with duiresis 9/26.         7.  Mood: Continue Paxil.    Dispo: monitor fevers. Await improvement in fever curve and work up of adenopathy.     Myrna Marrufo PA-C  [phone]    Attending note.   I have independently seen and examined the patient, reviewed the laboratory results and imaging and I have discussed todays' plan with the team of midlevel provider/fellow. My independent assessment and critical recommendations for the patient's care today are summarized below:        Angel Yanez is a 60 yo man Day +133 s/p 2nd auto for IgM kappa MM  now admitted with high fevers x 5 days, not ill, persistent low CBC.     Feeling better, more appetite, Again febrile, but overall trend is down, possibly due to Doxycicline. EBV titer is low. no infectious symptoms.   Axillary adenopathy.   CT with mediastinal and axillay LNs. No HSM.      Suspect atypical infection, less likely  PTLD. EBV titer is low.  , COnt  Doxycycline, consider LN core biopsy under CT guidance to work-up fever+ new onset adenopathy. Cont plt transfusions prn.        Marleni Darnell MD  Associate Professor of Medicine  [phone]

## 2019-09-27 NOTE — PLAN OF CARE
"/77 (BP Location: Right arm)   Pulse 98   Temp 101.2  F (38.4  C) (Axillary)   Resp 20   Ht 1.798 m (5' 10.8\")   Wt 80 kg (176 lb 5.9 oz)   SpO2 92%   BMI 24.74 kg/m  . PIV is patent and infusing. Patient c/o headache and temp max 101.7 ax. Primary team was notified about the patient's status and ordered Celebrex po x 1. Patient received tylenol 650 mg po x 1, blood cultures done by lab and ice packs x 3 with some relief. Patient is eating and drinking fair. Potassium was recheck and no needed for replacement. Patient received lasix 20 mg iv x 1 with some relief. Continue to encourage patient to do good mouth cares, cough, deep breath and sit up while eating or drinking. Continue with plan of care and notify MD for status changes.    Problem: Adult Inpatient Plan of Care  Goal: Plan of Care Review  Outcome: No Change     Problem: Adult Inpatient Plan of Care  Goal: Patient-Specific Goal (Individualization)  Outcome: No Change     Problem: Adult Inpatient Plan of Care  Goal: Readiness for Transition of Care  Outcome: No Change     Problem: Infection Risk (Fever with Neutropenia)  Goal: Absence of Infection  Outcome: No Change       "

## 2019-09-27 NOTE — PLAN OF CARE
"Pt stated he had nausea with coughing spell this evening.  Compazine given and will reassess.  Pt was febrile until 2100;  tylenol given x 1.  Also complained of left sided headache - ice pack given with an acceptable decrease in pain level.  Appears to be sinus in nature (pain \"pressure\" increases with coughing).  Lungs sounds with crackles in the bases - started pt on incentive spirometry.  Oxygen saturations 92% on room air - may need oxygen tonight while sleeping.  IV fluids stopped due to edema.  MDs want enlarged lymph node biopsied, but unable to do so today.  Platelets to be kept > 10K until day of biopsy then parameter increased to > 50K.  Pt has not felt good all evening.  Will recheck temp - 101.5 ax (tylenol given).    "

## 2019-09-27 NOTE — PROGRESS NOTES
Transplant Infectious Disease Consult Attending Attestation:   Mr. Yanez was seen and evaluated by me, Bharath Hollins MD. The case was discussed at length with the ID Fellow, Dr. Cisneros -- I agree with her interval history and examination, assessment and recommendations in this inpatient Transplant ID Progress note. This note reflects our joint decision-making and I have reviewed today's vital signs, medications, labs and imaging with Dr. Cisneros.  His complete review of systems is otherwise without new symptoms today beyond that in the ID Fellow's note.    (Please note the mistakenly-applied attestation provided for this ID note by Dr. Friedman of [facility], which was instead intended for the note filed today by SHANNON Denny, of the BMT service.)    The 9/25/19 blood EBV PCR viral load result is finally reported late today at 2,215 copies/ml which, along with a modest 9/25/19 serum LDH of 257, is not very indicative of emergent PTLD, so whether he really needs a lymph node biopsy now is questionable.    Although there are ID studies still pending (with the planned lymph node biopsy delayed until next week) and Transplant ID plans to follow with you, Mr. Yanez is generally stable, so Transplant ID will not see him over the weekend unless paged.  Dr. Sunny Kelley (pager [phone]) will see the patient for Transplant ID next week.     Bharath Hollins MD  Pager [phone]          [facility]  Transplant Infectious Disease Progress Note       Patient:  Angel Yanez, Date of birth 1960, Medical record number [id number]  Date of Visit:  09/27/2019         Assessment and Recommendations:   Recommendations:  - follow up multiple pending microbiologic diagnostics  - continue doxycycline for now, anticipate 7 day empiric course (if no specific diagnostics return requiring longer treatment duration)   - continue acyclovir for prophylaxis for now  - it appears that biopsy of a lymph node cannot be  "scheduled until 9/30, if patient improved by that time would be reasonable to hold off. However if EBV returns positive or patient status remains unchanged would encourage pursuing lymph node biopsy, in that case would send for gram stain and culture, von and fungal culture, AFB stain and AFB culture as well as path/cytology/EBV JEN    Thank you very much for this consultation. Transplant Infectious Disease will continue to follow with you however will not see over the weekend unless paged by primary team.      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      # Fever  # Lymphadenopathy - most prominent mediastinal and axillary   # Hx of PBSCT on 5/17/19 with poor cell dose and intermittent neutropenia / lymphopenia since transplant   Patient presents with abrupt onset of high fever without localizable symptoms and found to have new mediastinal and axillary lymphadenopathy. He has had minimal improvement with IV cefepime after almost 48 hours of treatment and his procalcitonin is negative arguing somewhat against typical bacterial etiologies, although some bacterial etiologies are considered--as well as are some viral and fungal.      Unclear whether recent trip to [place] and recent \"cold\" (which he reports recovering from) are related to current presentation. Viral infection NOS is considered (CMV has returned negative). Endemic fungi such as Histoplasma are considered (Cryptococcus has returned negative) and antigens are pending however with neg Fungitell less likely. Considering no nodules/infiltrate on chest CT Aspergillus unlikely (as well as he has not been so profoundly neutropenic to put him at highest risk), galactomannan negative " as well. As he lives in [place] and has a dog tick-borne diseases enter the differential and we are assessing for Anaplasma, Babesia (presentation does not fit as well with Lyme) and giving him doxycycline (as he did not improved with empiric cefepime). His dog also puts him at risk for Leptospirosis as well. I do not find any prior checks for latent TB and we can check a quantiferon (although he is low risk and dose not require airborne isolation). He has been in contact with birds and we can check Chlamydia group antibodies. We can check Bartonella antibodies as well.      Other things on the differential for new lymphadenopathy, however considered less likely (for various reasons, in some cases including lack of known exposures or lack of supporting signs/symptoms) include acute HIV infection, Diptheria (less likely considering pt received vaccine, pt has been receiving azithromycin which is typical treatment) Tularemia, Brucellosis, secondary syphilis, Toxoplasma.      PTLD is also consideration however unusual for auto PBSCT, EBV viral load pending as well.      Additional Infectious Disease issues include:  - QTc interval: 451 on 8/23/19 (pt has allergy to moxifloxacin)  - Bacterial prophylaxis:  Currently on doxycycline (previously on azithromycin)   - Pneumocystis prophylaxis: none  - Viral serostatus & prophylaxis: CMV+ EBV + HSV + on prophylactic acyclovir   - Fungal prophylaxis: none currently (discontinue on 9/27/19)   - Immunization status:  Unknown   - Gamma globulin status: IgG was 824 on 8/23/19      Carmen Cisneros MD  Infectious Diseases Fellow         History of Infectious Disease Illness:   This morning feels slightly improved in regards to fevers, fatigue.   Having somewhat frequent soft bowel movements but denies abdominal pain / cramping.   Persistent dry cough is very bothersome. Today he had one time production of greenish phlegm. Denies facial / sinus pain.   Denies, rash, SOB, chest pain.             Current Medications & Allergies:       acyclovir  800 mg Oral BID     calcium carbonate  600 mg Oral BID w/meals     doxycycline (VIBRAMYCIN) IV  100 mg Intravenous Q12H     PARoxetine  20 mg Oral QAM     vitamin D3  2,000 Units Oral Daily            Physical Exam:     Patient Vitals for the past 24 hrs:   BP Temp Temp src Pulse Resp SpO2 Weight   09/27/19 1114 119/84 99.4  F (37.4  C) Axillary -- 22 97 % --   09/27/19 1028 -- 99.6  F (37.6  C) Axillary -- -- -- --   09/27/19 0758 103/64 100  F (37.8  C) Axillary 98 18 94 % 80 kg (176 lb 5.9 oz)   09/27/19 0507 129/81 101.8  F (38.8  C) Axillary 110 20 91 % --   09/27/19 0040 123/79 101  F (38.3  C) Axillary 105 20 92 % --   09/26/19 2200 -- 101.5  F (38.6  C) Axillary -- -- -- --   09/26/19 2045 -- 99.2  F (37.3  C) Axillary -- -- -- --   09/26/19 1900 115/70 101.1  F (38.4  C) Axillary -- 26 92 % --   09/26/19 1800 -- 101.3  F (38.5  C) Axillary -- -- -- --   09/26/19 1700 -- 102.5  F (39.2  C) Axillary -- -- -- --   09/26/19 1606 123/69 102.4  F (39.1  C) Axillary -- 18 93 % --   09/26/19 1530 -- 103  F (39.4  C) Axillary -- -- -- --   09/26/19 1311 (!) 158/84 103.2  F (39.6  C) Axillary 119 18 -- 80.2 kg (176 lb 14.4 oz)     Ranges for vital signs:  Temp:  [99.2  F (37.3  C)-103.2  F (39.6  C)] 99.4  F (37.4  C)  Pulse:  [] 98  Heart Rate:  [100-114] 109  Resp:  [18-26] 22  BP: (103-158)/(64-84) 119/84  SpO2:  [91 %-97 %] 97 %  Vitals:    09/26/19 0815 09/26/19 1311 09/27/19 0758   Weight: 81.2 kg (179 lb) 80.2 kg (176 lb 14.4 oz) 80 kg (176 lb 5.9 oz)       Physical Examination:  GENERAL:  well-developed, well-nourished, no acute distress  HEAD:  Head is normocephalic, atraumatic. Sinuses are non-tender.    EYES:  Eyes have anicteric sclerae without conjunctival injection   ENT:  Oropharynx is moist without exudates or ulcers. Tongue is midline  NECK:  Supple. No cervical lymphadenopathy  LUNGS:  Clear to auscultation bilaterally.   CARDIOVASCULAR:   Regular rate and rhythm with no murmurs, gallops or rubs.  ABDOMEN:  Normal bowel sounds, soft, nontender. No appreciable hepatosplenomegaly.  SKIN:  No acute rashes.  PIV in place without any surrounding erythema or exudate.  NEUROLOGIC:  Grossly nonfocal. Active x4 extremities  Lymph: + mild axillary lymphadnopathy. No cervical or supraclavicular lymphadenopathy          Laboratory Data:     Inflammatory Markers    Recent Labs   Lab Test 09/25/19  1022   .0*       Immune Globulin Studies     Recent Labs   Lab Test 09/25/19  1018 08/23/19  1301 06/11/19  0918 05/06/19  0936 01/29/19  0810 01/14/19  0945   * 824 572* 636* 826 864   IGM  --  25* 20* 26* 43* 50*   IGE  --   --   --  3  --  6   IGA  --  15* 41* 45* 311 327       Metabolic Studies       Recent Labs   Lab Test 09/27/19  1058 09/27/19  0532 09/27/19  0444 09/26/19  0409  09/25/19  0258 09/24/19  1618 09/24/19  0149   NA  --   --  134 137   < >  --   --  138   POTASSIUM 3.5  --  3.2* 3.8   < >  --   --  3.7   CHLORIDE  --   --  104 105   < >  --   --  105   CO2  --   --  22 22   < >  --   --  27   ANIONGAP  --   --  8 10   < >  --   --  6   BUN  --   --  11 10   < >  --   --  10   CR  --   --  0.80 0.79   < >  --   --  0.85   GFRESTIMATED  --   --  >90 >90   < >  --   --  >90   GLC  --   --  106* 106*   < >  --   --  134*   ZHEN  --   --  8.5 8.3*   < >  --   --  8.0*   PHOS  --   --  2.2*  --   --   --   --   --    MAG  --   --  1.7  --   --   --   --   --    LACT  --  1.7  --   --   --  0.9  --   --    PCAL  --   --   --   --   --   --   --  0.27   FGTL  --   --   --   --   --   --  <31  --     < > = values in this interval not displayed.       Hepatic Studies    Recent Labs   Lab Test 09/25/19  1022 09/23/19  1737 09/03/19  0931 08/23/19  1301  06/03/19  0305   BILITOTAL  --  0.6 0.4 0.4   < > 0.2   DBIL  --   --   --   --   --  <0.1   ALKPHOS  --  80 76 80   < > 55   PROTTOTAL  --  7.0 7.1 7.3   < > 5.7*   ALBUMIN  --  3.9 3.8 4.1   < >  2.6*   AST  --  10 16 18   < > 18   ALT  --  18 15 20   < > 23   *  --   --  200  --   --     < > = values in this interval not displayed.     Gout Labs      Recent Labs   Lab Test 08/23/19  1301 05/16/19  1140 05/06/19  0936 01/23/19  0259 01/14/19  0945   URIC 4.2 4.6 4.6 4.4 4.2       Hematology Studies      Recent Labs   Lab Test 09/27/19  0444 09/26/19  0409  09/25/19  0301 09/24/19  0149 09/23/19  1737 09/17/19  0744   WBC 3.2* 2.5*  --  2.5* 2.6* 2.8* 2.2*   ANEU 2.7 2.3  --  2.0 1.6 1.5* 0.6*   ALYM 0.3* 0.2*  --  0.3* 0.5* 0.6* 1.0   NEGAR 0.1 0.0  --  0.1 0.5 0.6 0.5   AEOS 0.1 0.0  --  0.0 0.1 0.2 0.2   HGB 8.1* 8.2*  --  8.5* 8.1* 9.8* 9.7*   HCT 24.3* 25.4*  --  26.3* 24.0* 30.4* 28.9*   PLT 12* 19*   < > 8* 16* 18* 22*    < > = values in this interval not displayed.       Clotting Studies    Recent Labs   Lab Test 09/24/19  0149  07/22/19  0009 06/03/19  0305 05/29/19  1401   INR 1.19*  --  1.16* 1.10 1.14   PTT 42*   < > 74*  --  44*    < > = values in this interval not displayed.       Iron Testing    Recent Labs   Lab Test 09/27/19  0444   *     Arterial Blood Gas Testing    Recent Labs   Lab Test 01/14/19  1029   PH 7.43   PCO2 36   PO2 90   HCO3 23   O2PER 21      Urine Studies     Recent Labs   Lab Test 09/25/19  0822 09/23/19  1757 07/21/19 2013 05/30/19  2228 05/26/19  2104 05/06/19  0936   URINEPH 7.5* 6.0 6.0 6.0 6.0 6.0   NITRITE Negative Negative Negative Negative Negative Negative   LEUKEST Negative Negative Negative Negative Negative Negative   WBCU <1 1 1 1  --  1     Body fluid stats    Recent Labs   Lab Test 09/25/19  1331   GS No organisms seen  Called to  Bradley Cannon, 1515 9/25/19 TM.         Microbiology:  Fungal testing  Recent Labs   Lab Test 09/24/19  1618 06/02/19  1045   FGTL <31 <31   ASPGAI 0.05 0.04   ASPGAA Negative Negative       Last Culture results with specimen source  Culture Micro   Date Value Ref Range Status   09/27/2019 PENDING  Preliminary    09/26/2019 No growth after 10 hours  Preliminary   09/26/2019 No growth after 1 day  Preliminary   09/25/2019 Culture negative monitoring continues  Preliminary   09/25/2019 Culture negative monitoring continues  Preliminary   09/25/2019 No growth after 2 days  Preliminary   09/25/2019 No growth after 2 days  Preliminary   09/24/2019 No growth after 3 days  Preliminary   09/24/2019 No growth after 3 days  Preliminary   09/24/2019 Canceled, Test credited  Duplicate request    Final   09/24/2019 Culture in progress  Preliminary   09/23/2019 No growth after 4 days  Preliminary   09/23/2019 No growth after 3 days  Preliminary   09/23/2019 No growth after 3 days  Preliminary   07/25/2019 No growth  Final   07/25/2019 No growth  Final   07/24/2019 No growth  Final   07/23/2019 No growth  Final   07/23/2019 (A)  Final    Cultured on the 1st day of incubation:  Pseudomonas aeruginosa     07/23/2019   Final    Critical Value/Significant Value, preliminary result only, called to and read back by   Latrice Miranda RN. 7/23/19 @ 211, JOSELYN     07/23/2019 Susceptibility testing done on previous specimen  Final    Specimen Description   Date Value Ref Range Status   09/27/2019 Blood Left Arm  Final   09/26/2019 Blood Left Arm  Final   09/26/2019 Blood Unspecified Site  Final   09/26/2019 Blood  Final   09/25/2019 Blood  Final   09/25/2019 Other Transfusion Reaction Donor Unit  Final   09/25/2019 Other Transfusion Reaction Donor Unit  Final   09/25/2019 Other Transfusion Reaction Donor Unit  Final   09/25/2019 Other Transfusion Reaction Donor Unit  Final   09/25/2019 Blood Right Arm  Final   09/25/2019 Blood Right Arm  Final   09/24/2019 Blood  Final   09/24/2019 Blood Right Arm  Final   09/24/2019 Swab  Final   09/24/2019 Swab Rectal  Final   09/24/2019 Catheterized Urine  Final   09/23/2019 Blood Left Arm  Final   09/23/2019 Urine  Final   09/23/2019 Blood Right Arm  Final        Last check of C difficile  C Diff Toxin B PCR    Date Value Ref Range Status   05/24/2019 Negative NEG^Negative Final     Comment:     Negative: Clostridium difficile target DNA sequences NOT detected, presumed   negative for Clostridium difficile toxin B or the number of bacteria present   may be below the limit of detection for the test.  FDA approved assay performed using [employer] GeneXpert real-time PCR.  A negative result does not exclude actual disease due to Clostridium difficile   and may be due to improper collection, handling and storage of the specimen   or the number of organisms in the specimen is below the detection limit of the   assay.         Syphilis Testing    Rapid Plasma Reagin   Date Value Ref Range Status   01/05/2005 Negative NEG Final       Quantiferon testing   No lab results found.    Virology:  CMV viral loads    Recent Labs   Lab Test 09/25/19  2025 08/23/19  1302 08/06/19  1119 07/15/19  0955 07/08/19  1203   CSPEC EDTA PLASMA Plasma, EDTA anticoagulant Plasma, EDTA anticoagulant Plasma, EDTA anticoagulant Plasma, EDTA anticoagulant   CMVLOG Not Calculated Not Calculated Not Calculated Not Calculated Not Calculated       Log IU/mL of CMVQNT   Date Value Ref Range Status   09/25/2019 Not Calculated <2.1 [Log_IU]/mL Final   08/23/2019 Not Calculated <2.1 [Log_IU]/mL Final   08/06/2019 Not Calculated <2.1 [Log_IU]/mL Final   07/15/2019 Not Calculated <2.1 [Log_IU]/mL Final   07/08/2019 Not Calculated <2.1 [Log_IU]/mL Final   07/01/2019 Not Calculated <2.1 [Log_IU]/mL Final   06/24/2019 Not Calculated <2.1 [Log_IU]/mL Final   06/17/2019 Not Calculated <2.1 [Log_IU]/mL Final   06/07/2019 Not Calculated <2.1 [Log_IU]/mL Final   05/29/2019 Not Calculated <2.1 [Log_IU]/mL Final   05/17/2019 Not Calculated <2.1 [Log_IU]/mL Final      Hepatitis C Antibody   Date Value Ref Range Status   02/24/2006 Negative NEG Final   02/04/2005 Negative NEG Final       CMV IgG Antibody   Date Value Ref Range Status   01/05/2005 >160.0 EU/mL Final     Comment:      Positive for anti-CMV IgG     Herpes Simplex IgG Antibody Immune Status Ratio   Date Value Ref Range Status   02/04/2005 1.21  Final     Herpes Simplex IgG Antibody Interpretation   Date Value Ref Range Status   02/04/2005 Positive, suggests immunologic exposure.  Final     EBV Capsid Antibody IgG   Date Value Ref Range Status   05/06/2019 >8.0 (H) 0.0 - 0.8 AI Final     Comment:     Positive, suggests recent or past exposure  Antibody index (AI) values reflect qualitative changes in antibody   concentration that cannot be directly associated with clinical condition or   disease state.     01/14/2019 >8.0 (H) 0.0 - 0.8 AI Final     Comment:     Positive, suggests recent or past exposure  Antibody index (AI) values reflect qualitative changes in antibody   concentration that cannot be directly associated with clinical condition or   disease state.       Herpes Simplex Virus Type 1 IgG   Date Value Ref Range Status   05/06/2019 <0.2 0.0 - 0.8 AI Final     Comment:     No HSV-1 IgG antibodies detected.  Antibody index (AI) values reflect qualitative changes in antibody   concentration that cannot be directly associated with clinical condition or   disease state.     01/14/2019 <0.2 0.0 - 0.8 AI Final     Comment:     No HSV-1 IgG antibodies detected.  Antibody index (AI) values reflect qualitative changes in antibody   concentration that cannot be directly associated with clinical condition or   disease state.       Herpes Simplex Virus Type 2 IgG   Date Value Ref Range Status   05/06/2019 0.7 0.0 - 0.8 AI Final     Comment:     No HSV-2 IgG antibodies detected.  Antibody index (AI) values reflect qualitative changes in antibody   concentration that cannot be directly associated with clinical condition or   disease state.     01/14/2019 5.7 (H) 0.0 - 0.8 AI Final     Comment:     Positive.  IgG antibody to HSV-2 detected.  Antibody index (AI) values reflect qualitative changes in antibody   concentration that  cannot be directly associated with clinical condition or   disease state.

## 2019-09-27 NOTE — PLAN OF CARE
Shift: [id number]    Neuro: Alert and oriented x4  Cardiac: Febrile overnight, tachycardic, normotensive  Respiratory: RA  GI: Nauseated during coughing spells, not requiring interventions overnight  Diet/Appetite: Regular  : Voiding adequate amounts overnight, not saving urine  Activity: Up ad mickey  Pain: Denies  Skin: No new deficits  LDA(s): R PIV SL      Changes this shift: sepsis protocol triggered at 0515, lactic acid drawn, 40 mEq potassium given orally in AM, phos replacement ordered      Plan: Continue plan of care

## 2019-09-28 LAB
ABO + RH BLD: ABNORMAL
ABO + RH BLD: ABNORMAL
ANION GAP SERPL CALCULATED.3IONS-SCNC: 13 MMOL/L (ref 3–14)
ANISOCYTOSIS BLD QL SMEAR: SLIGHT
ASPERGILLUS AB TITR SER CF: NORMAL {TITER}
B DERMAT AB SER-ACNC: 0.2 IV
BACTERIA SPEC CULT: NORMAL
BASOPHILS # BLD AUTO: 0 10E9/L (ref 0–0.2)
BASOPHILS NFR BLD AUTO: 0 %
BLD GP AB SCN SERPL QL: ABNORMAL
BLD PROD TYP BPU: ABNORMAL
BLD PROD TYP BPU: NORMAL
BLD UNIT ID BPU: 0
BLOOD BANK CMNT PATIENT-IMP: ABNORMAL
BLOOD BANK CMNT PATIENT-IMP: ABNORMAL
BLOOD PRODUCT CODE: NORMAL
BPU ID: NORMAL
BUN SERPL-MCNC: 17 MG/DL (ref 7–30)
C PNEUM IGM TITR SER IF: NORMAL {TITER}
C PSITTACI IGM TITR SER IF: NORMAL {TITER}
C TRACH IGM TITR SER IF: NORMAL {TITER}
CALCIUM SERPL-MCNC: 8.8 MG/DL (ref 8.5–10.1)
CHLORIDE SERPL-SCNC: 104 MMOL/L (ref 94–109)
CO2 SERPL-SCNC: 21 MMOL/L (ref 20–32)
COCCIDIOIDES AB TITR SER CF: NORMAL {TITER}
CREAT SERPL-MCNC: 0.78 MG/DL (ref 0.66–1.25)
DIFFERENTIAL METHOD BLD: ABNORMAL
EOSINOPHIL # BLD AUTO: 0.4 10E9/L (ref 0–0.7)
EOSINOPHIL NFR BLD AUTO: 12.6 %
ERYTHROCYTE [DISTWIDTH] IN BLOOD BY AUTOMATED COUNT: 14.9 % (ref 10–15)
GFR SERPL CREATININE-BSD FRML MDRD: >90 ML/MIN/{1.73_M2}
GLUCOSE SERPL-MCNC: 95 MG/DL (ref 70–99)
H CAPSUL MYC AB TITR SER CF: NORMAL {TITER}
H CAPSUL YST AB TITR SER CF: NORMAL {TITER}
HCT VFR BLD AUTO: 22.6 % (ref 40–53)
HGB BLD-MCNC: 7.4 G/DL (ref 13.3–17.7)
LACTATE BLD-SCNC: 1.3 MMOL/L (ref 0.7–2)
LEPTOSPIRA IGM SER QL: NEGATIVE
LYMPHOCYTES # BLD AUTO: 0.7 10E9/L (ref 0.8–5.3)
LYMPHOCYTES NFR BLD AUTO: 26.1 %
MACROCYTES BLD QL SMEAR: PRESENT
MCH RBC QN AUTO: 37.6 PG (ref 26.5–33)
MCHC RBC AUTO-ENTMCNC: 32.7 G/DL (ref 31.5–36.5)
MCV RBC AUTO: 115 FL (ref 78–100)
MICROBIOLOGIST REVIEW: NORMAL
MONOCYTES # BLD AUTO: 0.1 10E9/L (ref 0–1.3)
MONOCYTES NFR BLD AUTO: 1.8 %
NEUTROPHILS # BLD AUTO: 1.7 10E9/L (ref 1.6–8.3)
NEUTROPHILS NFR BLD AUTO: 59.5 %
NUM BPU REQUESTED: 1
NUM BPU REQUESTED: 1
OVALOCYTES BLD QL SMEAR: SLIGHT
PHOSPHATE SERPL-MCNC: 2.6 MG/DL (ref 2.5–4.5)
PLATELET # BLD AUTO: 8 10E9/L (ref 150–450)
POIKILOCYTOSIS BLD QL SMEAR: SLIGHT
POTASSIUM SERPL-SCNC: 3.3 MMOL/L (ref 3.4–5.3)
POTASSIUM SERPL-SCNC: 3.4 MMOL/L (ref 3.4–5.3)
RBC # BLD AUTO: 1.97 10E12/L (ref 4.4–5.9)
SODIUM SERPL-SCNC: 138 MMOL/L (ref 133–144)
SPECIMEN EXP DATE BLD: ABNORMAL
SPECIMEN SOURCE: NORMAL
TRANSFUSION STATUS PATIENT QL: NORMAL
WBC # BLD AUTO: 2.8 10E9/L (ref 4–11)

## 2019-09-28 PROCEDURE — P9040 RBC LEUKOREDUCED IRRADIATED: HCPCS | Performed by: INTERNAL MEDICINE

## 2019-09-28 PROCEDURE — 25000132 ZZH RX MED GY IP 250 OP 250 PS 637: Performed by: PHYSICIAN ASSISTANT

## 2019-09-28 PROCEDURE — 85025 COMPLETE CBC W/AUTO DIFF WBC: CPT | Performed by: INTERNAL MEDICINE

## 2019-09-28 PROCEDURE — 86902 BLOOD TYPE ANTIGEN DONOR EA: CPT | Performed by: INTERNAL MEDICINE

## 2019-09-28 PROCEDURE — 84132 ASSAY OF SERUM POTASSIUM: CPT

## 2019-09-28 PROCEDURE — 25000132 ZZH RX MED GY IP 250 OP 250 PS 637: Performed by: INTERNAL MEDICINE

## 2019-09-28 PROCEDURE — 86850 RBC ANTIBODY SCREEN: CPT | Performed by: INTERNAL MEDICINE

## 2019-09-28 PROCEDURE — 86900 BLOOD TYPING SEROLOGIC ABO: CPT | Performed by: INTERNAL MEDICINE

## 2019-09-28 PROCEDURE — 36415 COLL VENOUS BLD VENIPUNCTURE: CPT

## 2019-09-28 PROCEDURE — 20600000 ZZH R&B BMT

## 2019-09-28 PROCEDURE — 83605 ASSAY OF LACTIC ACID: CPT

## 2019-09-28 PROCEDURE — 87040 BLOOD CULTURE FOR BACTERIA: CPT

## 2019-09-28 PROCEDURE — 84100 ASSAY OF PHOSPHORUS: CPT | Performed by: INTERNAL MEDICINE

## 2019-09-28 PROCEDURE — 25000125 ZZHC RX 250: Performed by: PHYSICIAN ASSISTANT

## 2019-09-28 PROCEDURE — P9037 PLATE PHERES LEUKOREDU IRRAD: HCPCS | Performed by: PHYSICIAN ASSISTANT

## 2019-09-28 PROCEDURE — 80048 BASIC METABOLIC PNL TOTAL CA: CPT | Performed by: INTERNAL MEDICINE

## 2019-09-28 PROCEDURE — 86901 BLOOD TYPING SEROLOGIC RH(D): CPT | Performed by: INTERNAL MEDICINE

## 2019-09-28 RX ORDER — DOXYCYCLINE 100 MG/1
100 CAPSULE ORAL EVERY 12 HOURS SCHEDULED
Status: DISCONTINUED | OUTPATIENT
Start: 2019-09-28 | End: 2019-09-29 | Stop reason: HOSPADM

## 2019-09-28 RX ADMIN — ACETAMINOPHEN 650 MG: 325 TABLET, FILM COATED ORAL at 04:58

## 2019-09-28 RX ADMIN — POTASSIUM CHLORIDE 40 MEQ: 750 TABLET, EXTENDED RELEASE ORAL at 04:59

## 2019-09-28 RX ADMIN — ACETAMINOPHEN 650 MG: 325 TABLET, FILM COATED ORAL at 18:37

## 2019-09-28 RX ADMIN — DOXYCYCLINE 100 MG: 100 CAPSULE ORAL at 20:16

## 2019-09-28 RX ADMIN — ACYCLOVIR 800 MG: 800 TABLET ORAL at 08:32

## 2019-09-28 RX ADMIN — MELATONIN 2000 UNITS: at 08:32

## 2019-09-28 RX ADMIN — PAROXETINE HYDROCHLORIDE HEMIHYDRATE 20 MG: 20 TABLET, FILM COATED ORAL at 08:32

## 2019-09-28 RX ADMIN — Medication 600 MG: at 08:32

## 2019-09-28 RX ADMIN — DOXYCYCLINE 100 MG: 100 CAPSULE ORAL at 10:48

## 2019-09-28 RX ADMIN — Medication 600 MG: at 18:14

## 2019-09-28 RX ADMIN — DOXYCYCLINE 100 MG: 100 INJECTION, POWDER, LYOPHILIZED, FOR SOLUTION INTRAVENOUS at 00:22

## 2019-09-28 RX ADMIN — POTASSIUM CHLORIDE 20 MEQ: 750 TABLET, EXTENDED RELEASE ORAL at 07:08

## 2019-09-28 RX ADMIN — DIPHENHYDRAMINE HYDROCHLORIDE 25 MG: 25 CAPSULE ORAL at 04:58

## 2019-09-28 RX ADMIN — ACYCLOVIR 800 MG: 800 TABLET ORAL at 20:16

## 2019-09-28 RX ADMIN — ACETAMINOPHEN 650 MG: 325 TABLET, FILM COATED ORAL at 14:33

## 2019-09-28 ASSESSMENT — ACTIVITIES OF DAILY LIVING (ADL)
ADLS_ACUITY_SCORE: 10

## 2019-09-28 ASSESSMENT — MIFFLIN-ST. JEOR: SCORE: 1623.19

## 2019-09-28 NOTE — PLAN OF CARE
"BP (!) 140/87 (BP Location: Left arm)   Pulse 88   Temp 101.3  F (38.5  C) (Axillary)   Resp 18   Ht 1.798 m (5' 10.8\")   Wt 79.9 kg (176 lb 3.2 oz)   SpO2 93%   BMI 24.71 kg/m  . Patient is A & O x 4. PIV is patent. Lactic acid was drawn and result is 1.3. Primary team aware of the temp max of 101.3 ax. Potassium was rechecked and no needed for replacement per order. Patient received unit of RBC and tolerated. Patient continues to have dry cough and did not want the robitussin. Patient is eating and drinking good. Patient reported last night he started to have itching on bilateral arms, without rash or redness. Patient walks on the hallway and no new event during this shift. Continue with plan of care and notify MD for status changes.     Problem: Adult Inpatient Plan of Care  Goal: Plan of Care Review  9/28/2019 1540 by Catie Morales RN  Outcome: No Change  Flowsheets (Taken 9/28/2019 1540)  Plan of Care Reviewed With: patient     Problem: Fever (Fever with Neutropenia)  Goal: Baseline Body Temperature  9/28/2019 1540 by Catie Morales, RN  Outcome: No Change     Problem: Infection Risk (Fever with Neutropenia)  Goal: Absence of Infection  Outcome: No Change       "

## 2019-09-28 NOTE — PROGRESS NOTES
"BMT Daily Progress Note      Patient ID: Angel Yanez is a 58 yo man Day +134 s/p 2nd auto for IgM kappa MM      Diagnosis MM Multiple myeloma  HCT Type Autologous    Prep Regimen Cytoxan  Melphalan   Donor Source Self     No  Primary BMT Provider Dr Lucio         INTERVAL  HISTORY     Guille is still febrile but continues to feel better. He is eating and drinking well- markedly improved intake yesterday. He thinks he would feel better at home and is eager for hospital discharge.      Review of Systems: 10 point ROS negative except as noted above.     Physical Exam  Blood pressure 106/80, pulse 83, temperature 99.4  F (37.4  C), temperature source Axillary, resp. rate 16, height 1.798 m (5' 10.8\"), weight 79.9 kg (176 lb 3.2 oz), SpO2 97 %.    Wt Readings from Last 4 Encounters:   09/28/19 79.9 kg (176 lb 3.2 oz)   09/17/19 80.2 kg (176 lb 12.8 oz)   09/10/19 79.9 kg (176 lb 1.6 oz)   09/03/19 79.2 kg (174 lb 9.6 oz)     General: NAD, pleasant  Eyes:  sclera anicteric and not injected  Nose/Mouth/Throat: OP moist, no ulcerations   Lungs: CTAB  Cardiovascular: RRR, no M/R/G   Lymphatics: no edema  Skin: no rash or petechiae   Neuro: non-focal, no gross deficits  Left forearm peripheral IV.     Labs  Lab Results   Component Value Date    WBC 2.8 (L) 09/28/2019    ANEU 1.7 09/28/2019    HGB 7.4 (L) 09/28/2019    HCT 22.6 (L) 09/28/2019    PLT 8 (LL) 09/28/2019     09/28/2019    POTASSIUM 3.3 (L) 09/28/2019    CHLORIDE 104 09/28/2019    CO2 21 09/28/2019    GLC 95 09/28/2019    BUN 17 09/28/2019    CR 0.78 09/28/2019    MAG 1.7 09/27/2019    INR 1.19 (H) 09/24/2019    BILITOTAL 0.6 09/23/2019    AST 10 09/23/2019    ALT 18 09/23/2019    ALKPHOS 80 09/23/2019    PROTTOTAL 7.0 09/23/2019    ALBUMIN 3.9 09/23/2019     Assessment and Plan  Assessment and Plan  Angel Yanez is a 58 yo man Day +133 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  BMT/MM:   Slow engraftment; cell dose was only 0.639x10^6. ([facility] [place] - known poor cell dose as failed chemo-mobilization 1/2019.)   - Stringent CR.     2.  HEME: Keep Hgb>8g/dL, plt >10.   - Ongoing cytopenias - low cell dose with transplant. Pt was given GCSF 9/10 and again 9/17 with ANC = 0.6.COunts improved today- infection?    - Plts 12 k      3.  ID:Fever curve improving 101.5  - No improvement in fevers on cefepime/azithro- 9/26 switch to doxycycline 100mg IV q12. Change to oral today. IF stable will discharge on this.   - ID work up is negative to date.   Ddx: viral vs improvement with doxy.   - Monitor today to see if fevers are truly improving or if he spikes again.   - EBV is 2k - PTLD is not likely. Will cancel lymphnode bx in IR>   - Obtain ID consult- Appreciate recs.  -9/25 Crypto neg, parasite stain negative. Asp GM/fungitell neg, EBV <3000.   -RVP positive for rhinovirus- not cause of fevers given really no cold symptoms.   -CRP is elevated at 160  - f/u blood cultures.  UA unremarkable, and urine streptococcal and legionella antigens-negative.    - Continue prophylactic ACV. Prophylactic pentamidine was last given 9/17.  - On CMV vaccine trial.      4. GI: no complaints.   - GI prophylaxis: omeprazole.       5.  FEN/Renal: Creat, lytes wnl.   Appetite is down - likely due to fevers. Okay for few days if persistent fevers may need to address nutritional concerns.   -  Repeat 20mg IV lasix.   - PRN lyte replacement per standing protocol.    6. Pulm: Resolved with lasix.         7.  Mood: Continue Paxil.    Dispo: change meds to oral today. As long as stable overnight plan for hospital discharge tomorrow with close f/u in BMT clinic.     Myrna Marrufo PA-C  [phone]      Dispo: monitor fevers. Await improvement in fever curve and work up of adenopathy.     Myrna Marrufo PA-C  [phone]    Attending note.   I have independently seen and examined the patient, reviewed the laboratory results and imaging and I have discussed todays' plan with the team of midlevel  provider/fellow. My independent assessment and critical recommendations for the patient's care today are summarized below:        Angel Yanez is a 60 yo man Day +134 s/p 2nd auto for IgM kappa MM  now admitted with high fevers x 5 days, not ill, persistent low CBC.     Feeling better again, more appetite, overall trend is down, possibly due to Doxycicline. EBV titer is low. no infectious symptoms.   Axillary adenopathy.   CT with mediastinal and axillay LNs. No HSM.      Suspect atypical infection, less likely  PTLD. EBV titer is low.  , COnt  Doxycycline orally and plan to discharge home tomorrow.   Still consider LN core biopsy under CT guidance to work-up fever+ new onset adenopathy -possibly outpatient. Cont plt transfusions prn.        Marleni Darnell MD  Associate Professor of Medicine  [phone]

## 2019-09-28 NOTE — PLAN OF CARE
Pt spiked a temp max of 101.0 ax. Prior to that , temp was 100.7 and blood culture was sent, tylenol given. He continue with IV Vibramycin OVSS. Pt took a shower before going to bed. Pt continue to have dry non productive cough and he declined to have cough medicine.Low potassium level, platelet and hemoglobin. Pt got po potassium and will need recheck at 11:00. Platelet given after premedication given but later on before pt said he does not take pre medication for blood products. Still will need PRBC when here. Continue to monitor pt and continue with plan of care.  Problem: Adult Inpatient Plan of Care  Goal: Optimal Comfort and Wellbeing  9/27/2019 1727 by Catie Morales, RN  Outcome: No Change     Problem: Fever (Fever with Neutropenia)  Goal: Baseline Body Temperature  9/28/2019 0649 by Bonnie Burciaga RN  Outcome: No Change  9/27/2019 1727 by Catie Morales, RN  Outcome: No Change     Problem: Infection Risk (Fever with Neutropenia)  Goal: Absence of Infection  9/27/2019 1727 by Catie Morales, RN  Outcome: No Change

## 2019-09-29 VITALS
DIASTOLIC BLOOD PRESSURE: 88 MMHG | BODY MASS INDEX: 24.88 KG/M2 | SYSTOLIC BLOOD PRESSURE: 137 MMHG | HEIGHT: 71 IN | RESPIRATION RATE: 16 BRPM | HEART RATE: 101 BPM | OXYGEN SATURATION: 93 % | WEIGHT: 177.7 LBS | TEMPERATURE: 98.3 F

## 2019-09-29 LAB
A PHAGOCYTOPH DNA BLD QL NAA+PROBE: NOT DETECTED
ANION GAP SERPL CALCULATED.3IONS-SCNC: 11 MMOL/L (ref 3–14)
ANISOCYTOSIS BLD QL SMEAR: SLIGHT
B MICROTI DNA SPEC QL NAA+PROBE: NOT DETECTED
BABESIA DNA SPEC QL NAA+PROBE: NOT DETECTED
BACTERIA SPEC CULT: NO GROWTH
BASOPHILS # BLD AUTO: 0 10E9/L (ref 0–0.2)
BASOPHILS NFR BLD AUTO: 0 %
BUN SERPL-MCNC: 14 MG/DL (ref 7–30)
CALCIUM SERPL-MCNC: 8.4 MG/DL (ref 8.5–10.1)
CHLORIDE SERPL-SCNC: 104 MMOL/L (ref 94–109)
CO2 SERPL-SCNC: 24 MMOL/L (ref 20–32)
CREAT SERPL-MCNC: 0.74 MG/DL (ref 0.66–1.25)
DIFFERENTIAL METHOD BLD: ABNORMAL
E CHAFFEENSIS DNA BLD QL NAA+PROBE: NOT DETECTED
E EWINGII DNA SPEC QL NAA+PROBE: NOT DETECTED
EHRLICHIA DNA SPEC QL NAA+PROBE: NOT DETECTED
EOSINOPHIL # BLD AUTO: 0.7 10E9/L (ref 0–0.7)
EOSINOPHIL NFR BLD AUTO: 17.4 %
ERYTHROCYTE [DISTWIDTH] IN BLOOD BY AUTOMATED COUNT: 17.4 % (ref 10–15)
GFR SERPL CREATININE-BSD FRML MDRD: >90 ML/MIN/{1.73_M2}
GLUCOSE SERPL-MCNC: 96 MG/DL (ref 70–99)
HCT VFR BLD AUTO: 25.8 % (ref 40–53)
HGB BLD-MCNC: 8.5 G/DL (ref 13.3–17.7)
LYMPHOCYTES # BLD AUTO: 1.5 10E9/L (ref 0.8–5.3)
LYMPHOCYTES NFR BLD AUTO: 40 %
Lab: NORMAL
MCH RBC QN AUTO: 36.5 PG (ref 26.5–33)
MCHC RBC AUTO-ENTMCNC: 32.9 G/DL (ref 31.5–36.5)
MCV RBC AUTO: 111 FL (ref 78–100)
MONOCYTES # BLD AUTO: 0.1 10E9/L (ref 0–1.3)
MONOCYTES NFR BLD AUTO: 3.5 %
NEUTROPHILS # BLD AUTO: 1.5 10E9/L (ref 1.6–8.3)
NEUTROPHILS NFR BLD AUTO: 39.1 %
PLATELET # BLD AUTO: 22 10E9/L (ref 150–450)
POTASSIUM SERPL-SCNC: 3.5 MMOL/L (ref 3.4–5.3)
RBC # BLD AUTO: 2.33 10E12/L (ref 4.4–5.9)
SODIUM SERPL-SCNC: 139 MMOL/L (ref 133–144)
SPECIMEN SOURCE: NORMAL
WBC # BLD AUTO: 3.8 10E9/L (ref 4–11)

## 2019-09-29 PROCEDURE — 80048 BASIC METABOLIC PNL TOTAL CA: CPT | Performed by: INTERNAL MEDICINE

## 2019-09-29 PROCEDURE — 25000132 ZZH RX MED GY IP 250 OP 250 PS 637: Performed by: PHYSICIAN ASSISTANT

## 2019-09-29 PROCEDURE — 86850 RBC ANTIBODY SCREEN: CPT | Performed by: PHYSICIAN ASSISTANT

## 2019-09-29 PROCEDURE — 86902 BLOOD TYPE ANTIGEN DONOR EA: CPT | Performed by: PHYSICIAN ASSISTANT

## 2019-09-29 PROCEDURE — 86901 BLOOD TYPING SEROLOGIC RH(D): CPT | Performed by: PHYSICIAN ASSISTANT

## 2019-09-29 PROCEDURE — 36415 COLL VENOUS BLD VENIPUNCTURE: CPT | Performed by: INTERNAL MEDICINE

## 2019-09-29 PROCEDURE — 86922 COMPATIBILITY TEST ANTIGLOB: CPT | Performed by: PHYSICIAN ASSISTANT

## 2019-09-29 PROCEDURE — 85025 COMPLETE CBC W/AUTO DIFF WBC: CPT | Performed by: INTERNAL MEDICINE

## 2019-09-29 PROCEDURE — 25000132 ZZH RX MED GY IP 250 OP 250 PS 637: Performed by: INTERNAL MEDICINE

## 2019-09-29 PROCEDURE — 86900 BLOOD TYPING SEROLOGIC ABO: CPT | Performed by: PHYSICIAN ASSISTANT

## 2019-09-29 RX ORDER — PENTAMIDINE ISETHIONATE 300 MG/300MG
300 INHALANT RESPIRATORY (INHALATION)
Status: ON HOLD | COMMUNITY
Start: 2019-09-29 | End: 2019-11-14

## 2019-09-29 RX ORDER — CODEINE PHOSPHATE AND GUAIFENESIN 10; 100 MG/5ML; MG/5ML
5 SOLUTION ORAL EVERY 4 HOURS PRN
Qty: 118 ML | Refills: 0 | Status: SHIPPED | OUTPATIENT
Start: 2019-09-29 | End: 2019-10-16

## 2019-09-29 RX ORDER — DOXYCYCLINE 100 MG/1
100 CAPSULE ORAL EVERY 12 HOURS
Qty: 10 CAPSULE | Refills: 0 | Status: SHIPPED | OUTPATIENT
Start: 2019-09-29 | End: 2019-10-16

## 2019-09-29 RX ADMIN — Medication 600 MG: at 08:27

## 2019-09-29 RX ADMIN — PAROXETINE HYDROCHLORIDE HEMIHYDRATE 20 MG: 20 TABLET, FILM COATED ORAL at 08:28

## 2019-09-29 RX ADMIN — DOXYCYCLINE 100 MG: 100 CAPSULE ORAL at 08:28

## 2019-09-29 RX ADMIN — MELATONIN 2000 UNITS: at 08:27

## 2019-09-29 RX ADMIN — ACYCLOVIR 800 MG: 800 TABLET ORAL at 08:28

## 2019-09-29 ASSESSMENT — ACTIVITIES OF DAILY LIVING (ADL)
ADLS_ACUITY_SCORE: 10

## 2019-09-29 ASSESSMENT — MIFFLIN-ST. JEOR: SCORE: 1629.99

## 2019-09-29 NOTE — DISCHARGE SUMMARY
"[facility] Discharge Summary   Angel Yanez MRN# [id number]   Age: 59 year old  YOB: 1960   Date of Admission: 9/23/2019  Date of Discharge:  9/29/19  Admitting Physician: Kody Mukherjee MD  Discharge Physician:  Marleni Darnell MD  Discharge Diagnoses:    1.) fevers of unknown etiology  2.) Pancytopenia secondary to chemotherapy and recent stem cell transplant  3.) s/p 2nd Autologous pbsct for MM  4.) Mild rash   5.) fatigue due to #1 and #2  6.) Immunocompromised due to #2 and #3  Discharge Medications:       Guille Yanez   Home Medication Instructions VAIBHAV:[id number]    Printed on:09/29/19 1119   Medication Information                      acyclovir (ZOVIRAX) 800 MG tablet  Take 1 tablet (800 mg) by mouth 2 times daily             calcium carbonate (CALCIUM CARBONATE) 600 MG tablet  Take 2 tablets by mouth daily              Cholecalciferol (VITAMIN D3) 2000 units CAPS  Take 2,000 Units by mouth daily              doxycycline hyclate (VIBRAMYCIN) 100 MG capsule  Take 1 capsule (100 mg) by mouth every 12 hours for 5 days             guaiFENesin-codeine (ROBITUSSIN AC) 100-10 MG/5ML solution  Take 5 mLs by mouth every 4 hours as needed for cough             PARoxetine (PAXIL) 20 MG tablet  Take 20 mg by mouth every morning             pentamidine (NEBUPENT) 300 MG neb solution  Inhale 300 mg into the lungs every 28 days Will get in BMT clinic. Next due 10/17/19               Brief History of Illness:    **Adopted from H&P  Angel Yanez is a 60 yo man Day +129 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     He was in his usual state of health till last night. This AM when he woke up, he felt little \"run down\". He went to work but was back home by 10:30AM. He measured his temp and it was 99.5. He then went to sleep and, when he woke up, he again checked his temp at 1600 which had increased to 102.3. He called BMT triage line and was advised to come in.      Besides the fever, he actually " feels pretty good. He reports a chronic mild cough on/off since BMT. He relates that the cough was slightly worse today but with no sputum or sinus symptoms. He had a minor cold about 1-1.5 weeks ago which consisted of some maxillary sinus pressure but no temperature or worsened cough and ultimately resolved completely. The cold happened immediately after patient had returned back from a trip to [place]. He describes mild generalized itchiness x 1 week since about he got his last dose of pentamidine but no rash or furuncles/boils. He currently has no central lines. He did a 17K mile bike run yesterday and 30K mile run last weekend.    Hospital Course:    Guille was admitted through ER 9/23/19 for new on set fevers. He still following [facility] in BMT clinic due to poor counts (has needed weekly GCSF post transplant due to low cell dose ~600,372fr64+/kg). He notes taht despite his low counts he was been working full time, riding bike 20-30 miles at time. He has had a cough for past few weeks following a mild cold which really was his only symptoms. He appeared very fatigued and had nearly persistent fevers 101-103.7 despite tylenol. His blood cultures remained negative throughout his hospitalziation. He was empirically started on Cefepime/azithro for possible Community acquired PNA and overage for possible bacteremia. He was not hypoxic and cough was no productive. GIven his high fevers and lack of improvement with abx Chest CT completed 9/25 which actually showed improvement in prior infiltrates. GIven Guille was still very fatigued and baseline work up negative for source of infection but CT did note new adenopathy in chest and axilla- ID consulted. They sent a battery of ID studies all which has been negative. We became concerned about EBV reactivation and PTLD as being possible source of fevers however EBV was only  Mildly elevated at 2,200. Given this decided no to pursue lymph node biopsy. Per ID recommendation given  no improvement with >48hrs of cefepime/azithro switch to doxycycline 100mg IV q12hrs as pt could have possible lyme disease or other arthopod bite- CLinically his course didn't classically match this picture but IMDs were pending. IT does seem that fever curve did improve with change from 103 to tmax 101. Overall it seems that viral cause for fever is more likely, escpecially with new rash elvolved the day of discharge. Clinically Guille started feeling better Friday and is markedly improved from the fatigue/letheragy and poor appetite he had early in his hospital stay. Plan to continue doxycycline for additional 5 days. He will be followed closley in BMT clinic as fevers are on-going but pt has been stable for nearly 1 week. Hopeful fevers will resolve without further intervention in next week. If they do not likely would repeat CAP CT scan to assess adenopathy and pursue lymphnode bx as next step but very hopeful this is a self limited infection.   CODE STATUS: Full Code  Discharge Instructions and Follow-Up:    Discharge diet: Regular diet as tolerated  Discharge activity: Activity as tolerated   Discharge follow-up: Follow up with [facility] as follows:10/1/19    Discharge Disposition:    Discharged to home.

## 2019-09-29 NOTE — PROGRESS NOTES
"BMT Daily Progress Note      Patient ID: Angel Yanez is a 60 yo man Day +135 s/p 2nd auto for IgM kappa MM      Diagnosis MM Multiple myeloma  HCT Type Autologous    Prep Regimen Cytoxan  Melphalan   Donor Source Self     No  Primary BMT Provider Dr Lucio         INTERVAL  HISTORY     Guille is still febrile but continues to feel better. OVerall he admits to see being much more fatigued than his prior baseline 1 week ago but feels markedly better than he did Thrusday/Friday of last week. Still has persistent cough. Itchy forearms seems better but new rash on back that is asymptomatic.      Review of Systems: 10 point ROS negative except as noted above.     Physical Exam  Blood pressure 137/88, pulse 101, temperature 98.3  F (36.8  C), temperature source Axillary, resp. rate 16, height 1.798 m (5' 10.8\"), weight 80.6 kg (177 lb 11.2 oz), SpO2 93 %.    Wt Readings from Last 4 Encounters:   09/29/19 80.6 kg (177 lb 11.2 oz)   09/17/19 80.2 kg (176 lb 12.8 oz)   09/10/19 79.9 kg (176 lb 1.6 oz)   09/03/19 79.2 kg (174 lb 9.6 oz)     General: NAD, pleasant  Eyes:  sclera anicteric and not injected  Nose/Mouth/Throat: OP moist, no ulcerations   Lungs: CTAB- dry intermittent cough uncahnged.   Cardiovascular: RRR, no M/R/G   Lymphatics: no edema  Skin: new scattered rash -macular erythamatous on back. Not raised and lesions amador and are not painful. Maybe very faint rash on abdomen/chest but none on extremities.   Neuro: non-focal, no gross deficits  Left forearm peripheral IV.     Labs  Lab Results   Component Value Date    WBC 3.8 (L) 09/29/2019    ANEU 1.5 (L) 09/29/2019    HGB 8.5 (L) 09/29/2019    HCT 25.8 (L) 09/29/2019    PLT 22 (LL) 09/29/2019     09/29/2019    POTASSIUM 3.5 09/29/2019    CHLORIDE 104 09/29/2019    CO2 24 09/29/2019    GLC 96 09/29/2019    BUN 14 09/29/2019    CR 0.74 09/29/2019    MAG 1.7 09/27/2019    INR 1.19 (H) 09/24/2019    BILITOTAL 0.6 09/23/2019    AST 10 09/23/2019    ALT 18 " 09/23/2019    ALKPHOS 80 09/23/2019    PROTTOTAL 7.0 09/23/2019    ALBUMIN 3.9 09/23/2019     Assessment and Plan  Assessment and Plan  Angel Yanez is a 60 yo man Day +135 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  BMT/MM:   Slow engraftment; cell dose was only 0.639x10^6. ([facility] [place] - known poor cell dose as failed chemo-mobilization 1/2019.)   - Stringent CR.     2.  HEME: Keep Hgb>8g/dL, plt >10.   - Ongoing cytopenias - low cell dose with transplant. Pt was given GCSF 9/10 and again 9/17 with ANC = 0.6.COunts improved throughout hospital stay? infection?    - Plts 22 (has been getting daily to every other day plt transfusions  - HGb improved 8.5 after 1 unit rbcs yesterday  - will have f/u Tuesday for possible plts and RBCS.      3.  ID:PErsistent fever 102.7 - but clinically feeling much better  - No improvement in fevers on cefepime/azithro- 9/26 switch to doxycycline 100mg IV q12. Will tx with doxycycline 100mg PO BID for additional 5 days (8 day course) - possible arthropod bite?   - ID work up is negative to date. Given new rash on back question if this is viral in nature- consider sending Parvovirus and hhv6 however no treatment for either and pt is peripheral stick so did not order.   - 9/30 had planned for lymphnode bx- cancel as adenopathy on exam is improved. Suspect cause of fever is viral illness.   Ddx: viral vs improvement with doxy. Pt is still febrile but clinically stable and feeling better- will discharge with close f/u in BMT clinic. Only readmit if new symptoms, or unstable.   - EBV is 2k - PTLD is not likely. Will cancel lymphnode bx in IR>   - Obtain ID consult- Appreciate recs.  Hospital work up: 9/25 Crypto neg, parasite stain negative. Asp GM/fungitell neg, EBV <3000. RVP positive for rhinovirus- not cause of fevers given really no cold symptoms. CRP is elevated at 160. blood cultures negative.  UA unremarkable, and urine streptococcal and legionella antigens-negative.    -  Continue prophylactic ACV. Prophylactic pentamidine was last given 9/17.  - On CMV vaccine trial.      4. GI: no complaints.   - GI prophylaxis: omeprazole.       5.  FEN/Renal: Creat, lytes wnl.   Appetite is down - likely due to fevers. Okay for few days if persistent fevers may need to address nutritional concerns.   -  Repeat 20mg IV lasix.   - PRN lyte replacement per standing protocol.    6. Pulm: Resolved with lasix.         7.  Mood: Continue Paxil.    Dispo charge home today.     Myrna Marrufo PA-C  [phone]        Attending note.   I have independently seen and examined the patient, reviewed the laboratory results and imaging and I have discussed todays' plan with the team of midlevel provider/fellow. My independent assessment and critical recommendations for the patient's care today are summarized below:        Angel Yanez is a 58 yo man Day +134 s/p 2nd auto for IgM kappa MM  now admitted with high fevers x 5 days, not ill, persistent low CBC.     Feeling better again, more appetite, overall trend is down, possibly due to Doxycicline. EBV titer is low. no infectious symptoms.   Axillary adenopathy.   CT with mediastinal and axillay LNs. No HSM.      Suspect atypical infection, less likely  PTLD. EBV titer is low.  , COnt  Doxycycline orally and plan to discharge home tomorrow.   Still consider LN core biopsy under CT guidance to work-up fever+ new onset adenopathy -possibly outpatient. Cont plt transfusions prn.        Marleni Darnell MD  Associate Professor of Medicine  [phone]

## 2019-09-29 NOTE — SUMMARY OF CARE
Guille Yanez   Home Medication Instructions VAIBHAV:[id number]    Printed on:09/29/19 3247   Medication Information                      acyclovir (ZOVIRAX) 800 MG tablet  Take 1 tablet (800 mg) by mouth 2 times daily             calcium carbonate (CALCIUM CARBONATE) 600 MG tablet  Take 2 tablets by mouth daily              Cholecalciferol (VITAMIN D3) 2000 units CAPS  Take 2,000 Units by mouth daily              doxycycline hyclate (VIBRAMYCIN) 100 MG capsule  Take 1 capsule (100 mg) by mouth every 12 hours for 5 days             guaiFENesin-codeine (ROBITUSSIN AC) 100-10 MG/5ML solution  Take 5 mLs by mouth every 4 hours as needed for cough             PARoxetine (PAXIL) 20 MG tablet  Take 20 mg by mouth every morning             pentamidine (NEBUPENT) 300 MG neb solution  Inhale 300 mg into the lungs every 28 days   Will get in BMT clinic. Next due 10/17/19               Stop Azithromycin while taking doxycycline. BMT clinic provider will let you know if you need to resume azithromycin based on your ANC (neutrophil count).

## 2019-09-29 NOTE — PLAN OF CARE
"Blood pressure (!) 144/92, pulse 101, temperature 99.2  F (37.3  C), temperature source Axillary, resp. rate 16, height 1.798 m (5' 10.8\"), weight 79.9 kg (176 lb 3.2 oz), SpO2 92 %.    Temp max at the beginning of the shift was 99.8. Pt continue to have dry non productive cough but declined to have GuaiFENesin-codeine. Pt denied any pain/N/V/D. Up in [place] this morning to get some coffee at the [employer]. No replacements needed. Antibiotic changed to po and pt tolerating it well.May be discharging this morning. Continue to monitor pt and continue with plan of care.  Problem: Adult Inpatient Plan of Care  Goal: Plan of Care Review  Outcome: No Change     Problem: Adult Inpatient Plan of Care  Goal: Patient-Specific Goal (Individualization)  Outcome: No Change     "

## 2019-09-29 NOTE — PLAN OF CARE
"Vital signs:  Temp: 98.3  F (36.8  C) Temp src: Axillary BP: 137/88 Pulse: 101 Heart Rate: 98 Resp: 16 SpO2: 93 % O2 Device: None (Room air)   Height: 179.8 cm (5' 10.8\") Weight: 80.6 kg (177 lb 11.2 oz)  Estimated body mass index is 24.92 kg/m  as calculated from the following:    Height as of this encounter: 1.798 m (5' 10.8\").    Weight as of this encounter: 80.6 kg (177 lb 11.2 oz).     Angel was discharged this afternoon in the care of his wife.  They will be returning to their home in [place].  Discharge instructions and medications were reviewed with him and all questions/concerns were addressed.  He was planning on picking up his one new medication on his way out of the hospital.  He is to begin his follow-up care on 10/1/19 in the clinic.      Problem: Adult Inpatient Plan of Care  Goal: Plan of Care Review  9/29/2019 1301 by Leah Jung RN  Outcome: Adequate for Discharge  Flowsheets (Taken 9/29/2019 1301)  Plan of Care Reviewed With: patient  Goal: Patient-Specific Goal (Individualization)  9/29/2019 1301 by Leah Jung RN  Outcome: Adequate for Discharge  Goal: Absence of Hospital-Acquired Illness or Injury  Outcome: Adequate for Discharge  Goal: Optimal Comfort and Wellbeing  Outcome: Adequate for Discharge  Goal: Readiness for Transition of Care  Outcome: Adequate for Discharge  Goal: Rounds/Family Conference  Outcome: Adequate for Discharge     Problem: Fever (Fever with Neutropenia)  Goal: Baseline Body Temperature  9/29/2019 1301 by Leah Jung RN  Outcome: Adequate for Discharge     Problem: Infection Risk (Fever with Neutropenia)  Goal: Absence of Infection  9/29/2019 1301 by Leah Jung RN  Outcome: Adequate for Discharge  "

## 2019-09-30 LAB
ABO + RH BLD: ABNORMAL
ABO + RH BLD: ABNORMAL
BACTERIA SPEC CULT: NO GROWTH
BLD GP AB SCN SERPL QL: ABNORMAL
BLD PROD TYP BPU: ABNORMAL
BLOOD BANK CMNT PATIENT-IMP: ABNORMAL
LAB SCANNED RESULT: NORMAL
Lab: NORMAL
NUM BPU REQUESTED: 2
SPECIMEN EXP DATE BLD: ABNORMAL
SPECIMEN SOURCE: NORMAL

## 2019-10-01 ENCOUNTER — ONCOLOGY VISIT (OUTPATIENT)
Dept: TRANSPLANT | Facility: CLINIC | Age: 59
End: 2019-10-01
Attending: PHYSICIAN ASSISTANT
Payer: COMMERCIAL

## 2019-10-01 ENCOUNTER — APPOINTMENT (OUTPATIENT)
Dept: LAB | Facility: CLINIC | Age: 59
End: 2019-10-01
Attending: PHYSICIAN ASSISTANT
Payer: COMMERCIAL

## 2019-10-01 VITALS
RESPIRATION RATE: 16 BRPM | TEMPERATURE: 97.2 F | HEART RATE: 96 BPM | WEIGHT: 174.2 LBS | DIASTOLIC BLOOD PRESSURE: 77 MMHG | BODY MASS INDEX: 24.43 KG/M2 | OXYGEN SATURATION: 100 % | SYSTOLIC BLOOD PRESSURE: 116 MMHG

## 2019-10-01 VITALS
DIASTOLIC BLOOD PRESSURE: 79 MMHG | HEART RATE: 100 BPM | OXYGEN SATURATION: 100 % | RESPIRATION RATE: 16 BRPM | SYSTOLIC BLOOD PRESSURE: 116 MMHG | TEMPERATURE: 98 F

## 2019-10-01 DIAGNOSIS — D70.9 NEUTROPENIC FEVER (H): Primary | ICD-10-CM

## 2019-10-01 DIAGNOSIS — D70.9 NEUTROPENIC FEVER (H): ICD-10-CM

## 2019-10-01 DIAGNOSIS — C90.01 MULTIPLE MYELOMA IN REMISSION (H): ICD-10-CM

## 2019-10-01 DIAGNOSIS — R50.81 NEUTROPENIC FEVER (H): Primary | ICD-10-CM

## 2019-10-01 DIAGNOSIS — R50.81 NEUTROPENIC FEVER (H): ICD-10-CM

## 2019-10-01 LAB
ALBUMIN SERPL-MCNC: 2.9 G/DL (ref 3.4–5)
ALP SERPL-CCNC: 392 U/L (ref 40–150)
ALT SERPL W P-5'-P-CCNC: 49 U/L (ref 0–70)
ANION GAP SERPL CALCULATED.3IONS-SCNC: 6 MMOL/L (ref 3–14)
ANISOCYTOSIS BLD QL SMEAR: SLIGHT
AST SERPL W P-5'-P-CCNC: 20 U/L (ref 0–45)
BACTERIA SPEC CULT: NO GROWTH
BASOPHILS # BLD AUTO: 0 10E9/L (ref 0–0.2)
BASOPHILS NFR BLD AUTO: 0 %
BILIRUB SERPL-MCNC: 0.9 MG/DL (ref 0.2–1.3)
BLD PROD TYP BPU: NORMAL
BLD UNIT ID BPU: 0
BLOOD PRODUCT CODE: NORMAL
BPU ID: NORMAL
BUN SERPL-MCNC: 18 MG/DL (ref 7–30)
CALCIUM SERPL-MCNC: 8.5 MG/DL (ref 8.5–10.1)
CHLORIDE SERPL-SCNC: 107 MMOL/L (ref 94–109)
CO2 SERPL-SCNC: 25 MMOL/L (ref 20–32)
CREAT SERPL-MCNC: 0.64 MG/DL (ref 0.66–1.25)
DIFFERENTIAL METHOD BLD: ABNORMAL
EOSINOPHIL # BLD AUTO: 0.2 10E9/L (ref 0–0.7)
EOSINOPHIL NFR BLD AUTO: 8 %
ERYTHROCYTE [DISTWIDTH] IN BLOOD BY AUTOMATED COUNT: 16.6 % (ref 10–15)
GFR SERPL CREATININE-BSD FRML MDRD: >90 ML/MIN/{1.73_M2}
GLUCOSE SERPL-MCNC: 111 MG/DL (ref 70–99)
HCT VFR BLD AUTO: 25.4 % (ref 40–53)
HGB BLD-MCNC: 8.4 G/DL (ref 13.3–17.7)
LYMPHOCYTES # BLD AUTO: 1.3 10E9/L (ref 0.8–5.3)
LYMPHOCYTES NFR BLD AUTO: 49 %
Lab: NORMAL
MCH RBC QN AUTO: 36.5 PG (ref 26.5–33)
MCHC RBC AUTO-ENTMCNC: 33.1 G/DL (ref 31.5–36.5)
MCV RBC AUTO: 110 FL (ref 78–100)
MONOCYTES # BLD AUTO: 0.4 10E9/L (ref 0–1.3)
MONOCYTES NFR BLD AUTO: 15 %
NEUTROPHILS # BLD AUTO: 0.7 10E9/L (ref 1.6–8.3)
NEUTROPHILS NFR BLD AUTO: 28 %
PLATELET # BLD AUTO: 10 10E9/L (ref 150–450)
POTASSIUM SERPL-SCNC: 3.6 MMOL/L (ref 3.4–5.3)
PROT SERPL-MCNC: 6.2 G/DL (ref 6.8–8.8)
RBC # BLD AUTO: 2.3 10E12/L (ref 4.4–5.9)
SODIUM SERPL-SCNC: 138 MMOL/L (ref 133–144)
SPECIMEN SOURCE: NORMAL
TRANSFUSION STATUS PATIENT QL: NORMAL
TRANSFUSION STATUS PATIENT QL: NORMAL
WBC # BLD AUTO: 2.6 10E9/L (ref 4–11)

## 2019-10-01 PROCEDURE — 86923 COMPATIBILITY TEST ELECTRIC: CPT | Performed by: PHYSICIAN ASSISTANT

## 2019-10-01 PROCEDURE — 85025 COMPLETE CBC W/AUTO DIFF WBC: CPT | Performed by: PHYSICIAN ASSISTANT

## 2019-10-01 PROCEDURE — G0463 HOSPITAL OUTPT CLINIC VISIT: HCPCS | Mod: 25

## 2019-10-01 PROCEDURE — 86900 BLOOD TYPING SEROLOGIC ABO: CPT | Performed by: PHYSICIAN ASSISTANT

## 2019-10-01 PROCEDURE — 96372 THER/PROPH/DIAG INJ SC/IM: CPT

## 2019-10-01 PROCEDURE — 25000128 H RX IP 250 OP 636: Mod: ZF | Performed by: STUDENT IN AN ORGANIZED HEALTH CARE EDUCATION/TRAINING PROGRAM

## 2019-10-01 PROCEDURE — P9037 PLATE PHERES LEUKOREDU IRRAD: HCPCS | Performed by: NURSE PRACTITIONER

## 2019-10-01 PROCEDURE — 36430 TRANSFUSION BLD/BLD COMPNT: CPT

## 2019-10-01 PROCEDURE — 80053 COMPREHEN METABOLIC PANEL: CPT | Performed by: PHYSICIAN ASSISTANT

## 2019-10-01 PROCEDURE — 86901 BLOOD TYPING SEROLOGIC RH(D): CPT | Performed by: PHYSICIAN ASSISTANT

## 2019-10-01 PROCEDURE — 86850 RBC ANTIBODY SCREEN: CPT | Performed by: PHYSICIAN ASSISTANT

## 2019-10-01 RX ORDER — PENTAMIDINE ISETHIONATE 300 MG/300MG
300 INHALANT RESPIRATORY (INHALATION)
Status: CANCELLED
Start: 2019-10-15

## 2019-10-01 RX ORDER — ALBUTEROL SULFATE 5 MG/ML
2.5 SOLUTION RESPIRATORY (INHALATION)
Status: CANCELLED
Start: 2019-10-15

## 2019-10-01 RX ORDER — HEPARIN SODIUM,PORCINE 10 UNIT/ML
5 VIAL (ML) INTRAVENOUS
Status: CANCELLED | OUTPATIENT
Start: 2019-10-15

## 2019-10-01 RX ADMIN — FILGRASTIM 480 MCG: 480 INJECTION, SOLUTION INTRAVENOUS; SUBCUTANEOUS at 14:53

## 2019-10-01 ASSESSMENT — PAIN SCALES - GENERAL: PAINLEVEL: NO PAIN (0)

## 2019-10-01 NOTE — NURSING NOTE
"Oncology Rooming Note    October 1, 2019 1:22 PM   Angel Yanez is a 59 year old male who presents for:    Chief Complaint   Patient presents with     Blood Draw     labs drawn via vpt by rn. vs taken     RECHECK     Return: MM     Initial Vitals: /77 (BP Location: Left arm, Patient Position: Sitting, Cuff Size: Adult Regular)   Pulse 96   Temp 97.2  F (36.2  C) (Axillary)   Resp 16   Wt 79 kg (174 lb 3.2 oz)   SpO2 100%   BMI 24.43 kg/m   Estimated body mass index is 24.43 kg/m  as calculated from the following:    Height as of 9/24/19: 1.798 m (5' 10.8\").    Weight as of this encounter: 79 kg (174 lb 3.2 oz). Body surface area is 1.99 meters squared.  No Pain (0) Comment: Data Unavailable   No LMP for male patient.  Allergies reviewed: Yes  Medications reviewed: Yes    Medications: Medication refills not needed today.  Pharmacy name entered into EPIC:    [facility] [place] [facility] [address] [facility] - [place], [place] - [address]    Clinical concerns: none       Kathy Perez CMA              "

## 2019-10-01 NOTE — PROGRESS NOTES
Infusion Nursing Note:  Angel Yanez presents today for platelets.    Patient seen by provider today: Yes: Ayana   present during visit today: Not Applicable.    Note: Patient received platelets with no premeds.  Patient also received GCSF x 1 in the left arm.  Patient has no complaints of pain.  Patient still has productive cough, managed with cough drops.  The cough has bee worked up inpatient and everything was negative per patient.    Intravenous Access:  Peripheral IV placed.    Treatment Conditions:  Lab Results   Component Value Date    HGB 8.4 10/01/2019     Lab Results   Component Value Date    WBC 2.6 10/01/2019      Lab Results   Component Value Date    ANEU 0.7 10/01/2019     Lab Results   Component Value Date    PLT 10 10/01/2019      Lab Results   Component Value Date     10/01/2019                   Lab Results   Component Value Date    POTASSIUM 3.6 10/01/2019           Lab Results   Component Value Date    MAG 1.7 09/27/2019            Lab Results   Component Value Date    CR 0.64 10/01/2019                   Lab Results   Component Value Date    ZHEN 8.5 10/01/2019                Lab Results   Component Value Date    BILITOTAL 0.9 10/01/2019           Lab Results   Component Value Date    ALBUMIN 2.9 10/01/2019                    Lab Results   Component Value Date    ALT 49 10/01/2019           Lab Results   Component Value Date    AST 20 10/01/2019       Results reviewed, labs MET treatment parameters, ok to proceed with treatment.      Post Infusion Assessment:  Patient tolerated infusion without incident.  Blood return noted pre and post infusion.  Site patent and intact, free from redness, edema or discomfort.  No evidence of extravasations.       Discharge Plan:   Discharge instructions reviewed with: Patient.  Patient and/or family verbalized understanding of discharge instructions and all questions answered.  Patient discharged in stable condition accompanied by:  self.  Departure Mode: Ambulatory.    Marry Astorga, RN, RN

## 2019-10-01 NOTE — PROGRESS NOTES
BMT Daily Progress Note      Patient ID: Angel Yanez is a 60 yo man Day +137 s/p 2nd auto for IgM kappa MM       Diagnosis MM Multiple myeloma  HCT Type Autologous    Prep Regimen Cytoxan  Melphalan   Donor Source Self     No  Primary BMT Provider Dr Lucio         INTERVAL  HISTORY     No further fevers. Rash on torso front and back. Had in hospital but maybe more on front now. No other new issues. Has a few days left of doxy.      Review of Systems: 10 point ROS negative except as noted above.     Physical Exam  /77 (BP Location: Left arm, Patient Position: Sitting, Cuff Size: Adult Regular)   Pulse 96   Temp 97.2  F (36.2  C) (Axillary)   Resp 16   Wt 79 kg (174 lb 3.2 oz)   SpO2 100%   BMI 24.43 kg/m    General: NAD, pleasant  Eyes:  sclera anicteric and not injected  Nose/Mouth/Throat: OP moist, no ulcerations   Lungs: CTAB- dry intermittent cough uncahnged.   Cardiovascular: RRR, no M/R/G.    Lymphatics: no edema  Skin: scattered rash -macular erythematous on trunk. Not raised and lesions amador and are not painful.   Neuro: non-focal, no gross deficits    Assessment and Plan  Angel Yanez is a 60 yo man Day +137 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  BMT/MM:   Slow engraftment; cell dose was only 0.639x10^6. (Marrow [place] - known poor cell dose as failed chemo-mobilization 1/2019.)   - Stringent CR.     2.  HEME: Keep Hgb>8g/dL, plt >10.   - Plts today. Type and screen for possible blood on Friday.   - Ongoing cytopenias - low cell dose with transplant. GCSF PRN. Diff pending. Will get GCSF in infusion if <1K.        3.  ID: Pt tells me no fevers since discharge on Sunday and he has checked 3 x a day.   - No improvement in fevers on cefepime/azithro- 9/26 switch to doxycycline 100mg IV q12. Doxycycline 100mg PO BID for additional 5 days (8 day course) - possible arthropod bite?? He has 2 days left.  - ID work up is negative to date. Rash could be viral.   - 9/30 had planned for  lymphnode bx- cancel as adenopathy on exam is resolved. Suspect cause of fever is viral illness.   Ddx: viral vs improvement with doxy.   - EBV is 2k - PTLD is not likely.   - Hospital work up: 9/25 Crypto neg, parasite stain negative. Asp GM/fungitell neg, EBV <3000. RVP positive for rhinovirus- not cause of fevers given really no cold symptoms. CRP is elevated at 160. blood cultures negative.  UA unremarkable, and urine streptococcal and legionella antigens-negative.  - Continue prophylactic ACV. Prophylactic pentamidine was last given 9/17.   - On CMV vaccine trial.      4. GI: no complaints.   - GI prophylaxis: omeprazole.    - Alk phos 392     5.  FEN/Renal: Creat, lytes wnl.   - PRN lyte replacement per standing protocol.         7.  Mood: Continue Paxil.       RTC: Friday with possible blood/plts/GCSF/eval rash.     Cassie Ferrari PA-C  #[phone]

## 2019-10-02 LAB
BACTERIA SPEC CULT: NO GROWTH
Lab: NORMAL
SPECIMEN SOURCE: NORMAL

## 2019-10-03 LAB
ABO + RH BLD: NORMAL
ABO + RH BLD: NORMAL
BACTERIA SPEC CULT: NO GROWTH
BLD GP AB SCN SERPL QL: NORMAL
BLD PROD TYP BPU: NORMAL
BLD UNIT ID BPU: 0
BLD UNIT ID BPU: 0
BLOOD BANK CMNT PATIENT-IMP: NORMAL
BLOOD PRODUCT CODE: NORMAL
BLOOD PRODUCT CODE: NORMAL
BPU ID: NORMAL
BPU ID: NORMAL
Lab: NORMAL
NUM BPU REQUESTED: 1
NUM BPU REQUESTED: 2
SPECIMEN EXP DATE BLD: NORMAL
SPECIMEN SOURCE: NORMAL
TRANSFUSION STATUS PATIENT QL: NORMAL

## 2019-10-04 ENCOUNTER — ONCOLOGY VISIT (OUTPATIENT)
Dept: TRANSPLANT | Facility: CLINIC | Age: 59
End: 2019-10-04
Attending: INTERNAL MEDICINE
Payer: COMMERCIAL

## 2019-10-04 ENCOUNTER — APPOINTMENT (OUTPATIENT)
Dept: LAB | Facility: CLINIC | Age: 59
End: 2019-10-04
Attending: INTERNAL MEDICINE
Payer: COMMERCIAL

## 2019-10-04 VITALS
BODY MASS INDEX: 23.86 KG/M2 | SYSTOLIC BLOOD PRESSURE: 120 MMHG | OXYGEN SATURATION: 100 % | TEMPERATURE: 98.1 F | HEART RATE: 94 BPM | WEIGHT: 170.1 LBS | DIASTOLIC BLOOD PRESSURE: 84 MMHG

## 2019-10-04 DIAGNOSIS — D70.9 NEUTROPENIC FEVER (H): Primary | ICD-10-CM

## 2019-10-04 DIAGNOSIS — C90.01 MULTIPLE MYELOMA IN REMISSION (H): ICD-10-CM

## 2019-10-04 DIAGNOSIS — R50.81 NEUTROPENIC FEVER (H): Primary | ICD-10-CM

## 2019-10-04 DIAGNOSIS — C90.01 MULTIPLE MYELOMA IN REMISSION (H): Primary | ICD-10-CM

## 2019-10-04 LAB
ABO + RH BLD: NORMAL
ABO + RH BLD: NORMAL
ALBUMIN SERPL-MCNC: 3.3 G/DL (ref 3.4–5)
ALP SERPL-CCNC: 288 U/L (ref 40–150)
ALT SERPL W P-5'-P-CCNC: 49 U/L (ref 0–70)
ANION GAP SERPL CALCULATED.3IONS-SCNC: 6 MMOL/L (ref 3–14)
ANISOCYTOSIS BLD QL SMEAR: ABNORMAL
AST SERPL W P-5'-P-CCNC: 20 U/L (ref 0–45)
BACTERIA SPEC CULT: NO GROWTH
BACTERIA SPEC CULT: NO GROWTH
BASOPHILS # BLD AUTO: 0 10E9/L (ref 0–0.2)
BASOPHILS NFR BLD AUTO: 0.9 %
BILIRUB SERPL-MCNC: 0.8 MG/DL (ref 0.2–1.3)
BLD GP AB SCN SERPL QL: NORMAL
BLD PROD TYP BPU: NORMAL
BLD UNIT ID BPU: 0
BLOOD BANK CMNT PATIENT-IMP: NORMAL
BLOOD PRODUCT CODE: NORMAL
BPU ID: NORMAL
BUN SERPL-MCNC: 13 MG/DL (ref 7–30)
CALCIUM SERPL-MCNC: 9.2 MG/DL (ref 8.5–10.1)
CHLORIDE SERPL-SCNC: 106 MMOL/L (ref 94–109)
CO2 SERPL-SCNC: 28 MMOL/L (ref 20–32)
CREAT SERPL-MCNC: 0.72 MG/DL (ref 0.66–1.25)
DACRYOCYTES BLD QL SMEAR: SLIGHT
DIFFERENTIAL METHOD BLD: ABNORMAL
EOSINOPHIL # BLD AUTO: 0.2 10E9/L (ref 0–0.7)
EOSINOPHIL NFR BLD AUTO: 5.2 %
ERYTHROCYTE [DISTWIDTH] IN BLOOD BY AUTOMATED COUNT: 16.3 % (ref 10–15)
GFR SERPL CREATININE-BSD FRML MDRD: >90 ML/MIN/{1.73_M2}
GLUCOSE SERPL-MCNC: 89 MG/DL (ref 70–99)
HCT VFR BLD AUTO: 30.1 % (ref 40–53)
HGB BLD-MCNC: 9.5 G/DL (ref 13.3–17.7)
LYMPHOCYTES # BLD AUTO: 1.1 10E9/L (ref 0.8–5.3)
LYMPHOCYTES NFR BLD AUTO: 27.8 %
Lab: NORMAL
Lab: NORMAL
MACROCYTES BLD QL SMEAR: PRESENT
MCH RBC QN AUTO: 36.8 PG (ref 26.5–33)
MCHC RBC AUTO-ENTMCNC: 31.6 G/DL (ref 31.5–36.5)
MCV RBC AUTO: 117 FL (ref 78–100)
MONOCYTES # BLD AUTO: 0.3 10E9/L (ref 0–1.3)
MONOCYTES NFR BLD AUTO: 7.8 %
NEUTROPHILS # BLD AUTO: 2.3 10E9/L (ref 1.6–8.3)
NEUTROPHILS NFR BLD AUTO: 58.3 %
PLATELET # BLD AUTO: 19 10E9/L (ref 150–450)
PLATELET # BLD EST: ABNORMAL 10*3/UL
POIKILOCYTOSIS BLD QL SMEAR: SLIGHT
POLYCHROMASIA BLD QL SMEAR: SLIGHT
POTASSIUM SERPL-SCNC: 4.2 MMOL/L (ref 3.4–5.3)
PROT SERPL-MCNC: 7.1 G/DL (ref 6.8–8.8)
RBC # BLD AUTO: 2.58 10E12/L (ref 4.4–5.9)
SODIUM SERPL-SCNC: 140 MMOL/L (ref 133–144)
SPECIMEN EXP DATE BLD: NORMAL
SPECIMEN SOURCE: NORMAL
SPECIMEN SOURCE: NORMAL
TRANSFUSION STATUS PATIENT QL: NORMAL
WBC # BLD AUTO: 4 10E9/L (ref 4–11)

## 2019-10-04 PROCEDURE — 86901 BLOOD TYPING SEROLOGIC RH(D): CPT | Performed by: PHYSICIAN ASSISTANT

## 2019-10-04 PROCEDURE — 85025 COMPLETE CBC W/AUTO DIFF WBC: CPT | Performed by: PHYSICIAN ASSISTANT

## 2019-10-04 PROCEDURE — 86900 BLOOD TYPING SEROLOGIC ABO: CPT | Performed by: PHYSICIAN ASSISTANT

## 2019-10-04 PROCEDURE — 40000268 ZZH STATISTIC NO CHARGES: Mod: ZF

## 2019-10-04 PROCEDURE — 86850 RBC ANTIBODY SCREEN: CPT | Performed by: PHYSICIAN ASSISTANT

## 2019-10-04 PROCEDURE — 80053 COMPREHEN METABOLIC PANEL: CPT | Performed by: PHYSICIAN ASSISTANT

## 2019-10-04 PROCEDURE — 36415 COLL VENOUS BLD VENIPUNCTURE: CPT

## 2019-10-04 PROCEDURE — G0463 HOSPITAL OUTPT CLINIC VISIT: HCPCS

## 2019-10-04 ASSESSMENT — PAIN SCALES - GENERAL: PAINLEVEL: NO PAIN (0)

## 2019-10-04 NOTE — PROGRESS NOTES
BMT Daily Progress Note      Patient ID: Angel Yanez is a 60 yo man Day +140 s/p 2nd auto for IgM kappa MM       Diagnosis MM Multiple myeloma  HCT Type Autologous    Prep Regimen Cytoxan  Melphalan   Donor Source Self     No  Primary BMT Provider Dr Lucio         INTERVAL  HISTORY   Returns for follow up. Feeling well. Rash continues to resolve, it is more pink than red and hyperpigmented on his back. No further fevers since discharge. Fatigued with a little residual cough but otherwise no infectious symptoms. Weight down since discharge but appetite stable. No n/v/d. Hoping to go back to work on Monday.      Review of Systems: 8 point ROS negative except as noted above.     Physical Exam  There were no vitals taken for this visit.   Reviewed in flowsheets- WNL    Wt Readings from Last 5 Encounters:   10/04/19 77.2 kg (170 lb 1.6 oz)   10/01/19 79 kg (174 lb 3.2 oz)   09/29/19 80.6 kg (177 lb 11.2 oz)   09/17/19 80.2 kg (176 lb 12.8 oz)   09/10/19 79.9 kg (176 lb 1.6 oz)       General: NAD, pleasant   Eyes:  sclera anicteric and not injected  Nose/Mouth/Throat: OP moist, no ulcerations   Lungs: CTAB- dry intermittent cough unchanged.   Cardiovascular: RRR, no M/R/G.    Lymphatics: no edema  Skin: scattered rash -macular rash on trunk that is pink in color. Hyperpigmented on the back.  Not raised and lesions amador and are not painful.   Neuro: non-focal, no gross deficits    Lab Results   Component Value Date    WBC 4.0 10/04/2019    ANEU 0.7 (L) 10/01/2019    HGB 9.5 (L) 10/04/2019    HCT 30.1 (L) 10/04/2019    PLT 19 (LL) 10/04/2019     10/01/2019    POTASSIUM 3.6 10/01/2019    CHLORIDE 107 10/01/2019    CO2 25 10/01/2019     (H) 10/01/2019    BUN 18 10/01/2019    CR 0.64 (L) 10/01/2019    MAG 1.7 09/27/2019    INR 1.19 (H) 09/24/2019           Assessment and Plan  Angel Yanez is a 60 yo man Day +140 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  BMT/MM:   Slow engraftment; cell dose was only  0.639x10^6. (Marrow [place] - known poor cell dose as failed chemo-mobilization 1/2019.)   - Stringent CR.     2.  HEME: Keep Hgb>8g/dL, plt >10.   - counts stable- last received gcsf on 10/1. No transfusions today.   - Ongoing cytopenias - low cell dose with transplant. GCSF PRN.     3.  ID:    - Fevers of undetermined etiology: ID work up neg (see below).  favor viral illness.  No improvement in fevers on cefepime/azithro- 9/26 switch to doxycycline 100mg IV q12. Doxycycline 100mg PO BID x 8days. Completes today. No further fevers  - 9/30 had planned for lymphnode bx- cancel as adenopathy on exam is resolved. Suspect cause of fever is viral illness. EBV is 2k - PTLD is not likely.   - Hospital work up: 9/25 Crypto neg, parasite stain negative. Asp GM/fungitell neg, EBV <3000. RVP positive for rhinovirus- not cause of fevers given really no cold symptoms. CRP is elevated at 160. blood cultures negative.  UA unremarkable, and urine streptococcal and legionella antigens-negative.  - Continue prophylactic ACV. Prophylactic pentamidine was last given 9/17. Pt to resume azithromycin through the weekend d/t recent neutropenia, if ANC stable on Tuesday okay to stop.   - On CMV vaccine trial.      4. GI: no complaints.   - GI prophylaxis: omeprazole.    - Alk phos 392     5.  FEN/Renal: Creat, lytes wnl.   - PRN lyte replacement per standing protocol.         7.  Mood: Continue Paxil.    8. Rash: Favor due to viral illness. Improving without intervention (other than antibiotics).        RTC: Tuesday with possible blood/plts/GCSF/eval rash.     Fei Meng PA-C  [phone]

## 2019-10-04 NOTE — NURSING NOTE
"Oncology Rooming Note    October 4, 2019 9:45 AM   Angel Yanez is a 59 year old male who presents for:    Chief Complaint   Patient presents with     RECHECK     Pt is here for labs and to see NP for MM     Initial Vitals: There were no vitals taken for this visit. Estimated body mass index is 23.86 kg/m  as calculated from the following:    Height as of 9/24/19: 1.798 m (5' 10.8\").    Weight as of an earlier encounter on 10/4/19: 77.2 kg (170 lb 1.6 oz). There is no height or weight on file to calculate BSA.  Data Unavailable Comment: Data Unavailable   No LMP for male patient.  Allergies reviewed: Yes  Medications reviewed: Yes    Medications: Medication refills not needed today.  Pharmacy name entered into EPIC:    [facility] - [place], [place] - [address] [facility] - [place], [place] - [address]    Clinical concerns: none       Shoshana Salazar MA              "

## 2019-10-04 NOTE — NURSING NOTE
Vitals done, labs drawn by venipuncture.  See doc flow sheets for details.  Yolie Ray, JORGE LUIS October 4, 2019

## 2019-10-07 LAB
BLD PROD TYP BPU: NORMAL
NUM BPU REQUESTED: 1

## 2019-10-08 ENCOUNTER — ONCOLOGY VISIT (OUTPATIENT)
Dept: TRANSPLANT | Facility: CLINIC | Age: 59
End: 2019-10-08
Attending: PHYSICIAN ASSISTANT
Payer: COMMERCIAL

## 2019-10-08 ENCOUNTER — APPOINTMENT (OUTPATIENT)
Dept: LAB | Facility: CLINIC | Age: 59
End: 2019-10-08
Attending: INTERNAL MEDICINE
Payer: COMMERCIAL

## 2019-10-08 ENCOUNTER — INFUSION THERAPY VISIT (OUTPATIENT)
Dept: TRANSPLANT | Facility: CLINIC | Age: 59
End: 2019-10-08
Attending: INTERNAL MEDICINE
Payer: COMMERCIAL

## 2019-10-08 VITALS
SYSTOLIC BLOOD PRESSURE: 119 MMHG | RESPIRATION RATE: 14 BRPM | WEIGHT: 172 LBS | BODY MASS INDEX: 24.12 KG/M2 | DIASTOLIC BLOOD PRESSURE: 81 MMHG | OXYGEN SATURATION: 100 % | TEMPERATURE: 98.3 F | HEART RATE: 103 BPM

## 2019-10-08 DIAGNOSIS — C90.01 MULTIPLE MYELOMA IN REMISSION (H): Primary | ICD-10-CM

## 2019-10-08 LAB
ALBUMIN SERPL-MCNC: 3.5 G/DL (ref 3.4–5)
ALP SERPL-CCNC: 206 U/L (ref 40–150)
ALT SERPL W P-5'-P-CCNC: 43 U/L (ref 0–70)
ANION GAP SERPL CALCULATED.3IONS-SCNC: 7 MMOL/L (ref 3–14)
AST SERPL W P-5'-P-CCNC: 21 U/L (ref 0–45)
BASOPHILS # BLD AUTO: 0 10E9/L (ref 0–0.2)
BASOPHILS NFR BLD AUTO: 0.5 %
BILIRUB SERPL-MCNC: 0.6 MG/DL (ref 0.2–1.3)
BLD PROD TYP BPU: NORMAL
BLD UNIT ID BPU: 0
BLOOD PRODUCT CODE: NORMAL
BPU ID: NORMAL
BUN SERPL-MCNC: 17 MG/DL (ref 7–30)
CALCIUM SERPL-MCNC: 8.7 MG/DL (ref 8.5–10.1)
CHLORIDE SERPL-SCNC: 105 MMOL/L (ref 94–109)
CO2 SERPL-SCNC: 26 MMOL/L (ref 20–32)
CREAT SERPL-MCNC: 0.72 MG/DL (ref 0.66–1.25)
DIFFERENTIAL METHOD BLD: ABNORMAL
EOSINOPHIL # BLD AUTO: 0.1 10E9/L (ref 0–0.7)
EOSINOPHIL NFR BLD AUTO: 6.7 %
ERYTHROCYTE [DISTWIDTH] IN BLOOD BY AUTOMATED COUNT: 15.4 % (ref 10–15)
GFR SERPL CREATININE-BSD FRML MDRD: >90 ML/MIN/{1.73_M2}
GLUCOSE SERPL-MCNC: 131 MG/DL (ref 70–99)
HCT VFR BLD AUTO: 27.5 % (ref 40–53)
HGB BLD-MCNC: 9.1 G/DL (ref 13.3–17.7)
IMM GRANULOCYTES # BLD: 0 10E9/L (ref 0–0.4)
IMM GRANULOCYTES NFR BLD: 0.5 %
LYMPHOCYTES # BLD AUTO: 1 10E9/L (ref 0.8–5.3)
LYMPHOCYTES NFR BLD AUTO: 48.6 %
Lab: NORMAL
MCH RBC QN AUTO: 37.9 PG (ref 26.5–33)
MCHC RBC AUTO-ENTMCNC: 33.1 G/DL (ref 31.5–36.5)
MCV RBC AUTO: 115 FL (ref 78–100)
MONOCYTES # BLD AUTO: 0.3 10E9/L (ref 0–1.3)
MONOCYTES NFR BLD AUTO: 12.9 %
NEUTROPHILS # BLD AUTO: 0.7 10E9/L (ref 1.6–8.3)
NEUTROPHILS NFR BLD AUTO: 30.8 %
NRBC # BLD AUTO: 0 10*3/UL
NRBC BLD AUTO-RTO: 0 /100
PLATELET # BLD AUTO: 16 10E9/L (ref 150–450)
PLATELET # BLD EST: ABNORMAL 10*3/UL
POTASSIUM SERPL-SCNC: 3.8 MMOL/L (ref 3.4–5.3)
PROT SERPL-MCNC: 7.1 G/DL (ref 6.8–8.8)
RBC # BLD AUTO: 2.4 10E12/L (ref 4.4–5.9)
SODIUM SERPL-SCNC: 138 MMOL/L (ref 133–144)
SPECIMEN SOURCE: NORMAL
TRANSFUSION STATUS PATIENT QL: NORMAL
TRANSFUSION STATUS PATIENT QL: NORMAL
WBC # BLD AUTO: 2.1 10E9/L (ref 4–11)
YEAST SPEC QL CULT: NO GROWTH

## 2019-10-08 PROCEDURE — 87799 DETECT AGENT NOS DNA QUANT: CPT | Performed by: PHYSICIAN ASSISTANT

## 2019-10-08 PROCEDURE — 36415 COLL VENOUS BLD VENIPUNCTURE: CPT

## 2019-10-08 PROCEDURE — 80053 COMPREHEN METABOLIC PANEL: CPT | Performed by: PHYSICIAN ASSISTANT

## 2019-10-08 PROCEDURE — 86923 COMPATIBILITY TEST ELECTRIC: CPT | Performed by: PHYSICIAN ASSISTANT

## 2019-10-08 PROCEDURE — 86901 BLOOD TYPING SEROLOGIC RH(D): CPT | Performed by: PHYSICIAN ASSISTANT

## 2019-10-08 PROCEDURE — 86850 RBC ANTIBODY SCREEN: CPT | Performed by: PHYSICIAN ASSISTANT

## 2019-10-08 PROCEDURE — 86900 BLOOD TYPING SEROLOGIC ABO: CPT | Performed by: PHYSICIAN ASSISTANT

## 2019-10-08 PROCEDURE — 85025 COMPLETE CBC W/AUTO DIFF WBC: CPT | Performed by: PHYSICIAN ASSISTANT

## 2019-10-08 PROCEDURE — G0463 HOSPITAL OUTPT CLINIC VISIT: HCPCS | Mod: ZF

## 2019-10-08 PROCEDURE — 25000128 H RX IP 250 OP 636: Mod: ZF | Performed by: STUDENT IN AN ORGANIZED HEALTH CARE EDUCATION/TRAINING PROGRAM

## 2019-10-08 RX ORDER — PENTAMIDINE ISETHIONATE 300 MG/300MG
300 INHALANT RESPIRATORY (INHALATION)
Status: CANCELLED
Start: 2019-10-15

## 2019-10-08 RX ORDER — ALBUTEROL SULFATE 5 MG/ML
2.5 SOLUTION RESPIRATORY (INHALATION)
Status: CANCELLED
Start: 2019-10-15

## 2019-10-08 RX ORDER — HEPARIN SODIUM,PORCINE 10 UNIT/ML
5 VIAL (ML) INTRAVENOUS
Status: CANCELLED | OUTPATIENT
Start: 2019-10-15

## 2019-10-08 RX ADMIN — FILGRASTIM 480 MCG: 480 INJECTION, SOLUTION INTRAVENOUS; SUBCUTANEOUS at 13:26

## 2019-10-08 ASSESSMENT — PAIN SCALES - GENERAL: PAINLEVEL: NO PAIN (0)

## 2019-10-08 NOTE — NURSING NOTE
"Oncology Rooming Note    October 8, 2019 12:57 PM   Angel Yanez is a 59 year old male who presents for:    Chief Complaint   Patient presents with     RECHECK     Return: MM      Initial Vitals: /81 (BP Location: Right arm, Patient Position: Sitting, Cuff Size: Adult Regular)   Pulse 103   Temp 98.3  F (36.8  C) (Oral)   Resp 14   Wt 78 kg (172 lb)   SpO2 100%   BMI 24.12 kg/m   Estimated body mass index is 24.12 kg/m  as calculated from the following:    Height as of 9/24/19: 1.798 m (5' 10.8\").    Weight as of this encounter: 78 kg (172 lb). Body surface area is 1.97 meters squared.  No Pain (0) Comment: Data Unavailable   No LMP for male patient.  Allergies reviewed: Yes  Medications reviewed: Yes    Medications: Medication refills not needed today.  Pharmacy name entered into EPIC:    [facility] - [place], [place] - [address] [facility] - [place], [place] - [address]    Clinical concerns: None       Kathy Perez CMA              "

## 2019-10-08 NOTE — NURSING NOTE
Chief Complaint   Patient presents with     RECHECK     Return: MM      Blood Draw     Labs drawn via VPT by MA in LAB. VS taken. Pt. checked in for appt.        Ileana Valenzuela, CMA

## 2019-10-08 NOTE — PROGRESS NOTES
BMT Daily Progress Note      Patient ID: Angel Yanez is a 60 yo man Day +144 s/p 2nd auto for IgM kappa MM       Diagnosis MM Multiple myeloma  HCT Type Autologous    Prep Regimen Cytoxan  Melphalan   Donor Source Self     No  Primary BMT Provider Dr Lucio         INTERVAL  HISTORY   Rash improving. No fevers. No new issues other then continued frustration with low counts.      Review of Systems: 8 point ROS negative except as noted above.     Physical Exam  /81 (BP Location: Right arm, Patient Position: Sitting, Cuff Size: Adult Regular)   Pulse 103   Temp 98.3  F (36.8  C) (Oral)   Resp 14   Wt 78 kg (172 lb)   SpO2 100%   BMI 24.12 kg/m       Wt Readings from Last 5 Encounters:   10/08/19 78 kg (172 lb)   10/04/19 77.2 kg (170 lb 1.6 oz)   10/01/19 79 kg (174 lb 3.2 oz)   09/29/19 80.6 kg (177 lb 11.2 oz)   09/17/19 80.2 kg (176 lb 12.8 oz)       General: NAD, pleasant   Eyes:  sclera anicteric and not injected  Nose/Mouth/Throat: OP moist, no ulcerations   Lungs: CTAB- dry intermittent cough unchanged.   Cardiovascular: RRR, no M/R/G.    Lymphatics: no edema  Skin: scattered rash -macular rash on trunk that is pink in color. Hyperpigmented on the back.  Not raised and lesions amador and are not painful.   Neuro: non-focal, no gross deficits    Lab Results   Component Value Date    WBC 4.0 10/04/2019    ANEU 2.3 10/04/2019    HGB 9.5 (L) 10/04/2019    HCT 30.1 (L) 10/04/2019    PLT 19 (LL) 10/04/2019     10/04/2019    POTASSIUM 4.2 10/04/2019    CHLORIDE 106 10/04/2019    CO2 28 10/04/2019    GLC 89 10/04/2019    BUN 13 10/04/2019    CR 0.72 10/04/2019    MAG 1.7 09/27/2019    INR 1.19 (H) 09/24/2019           Assessment and Plan  Angel Yanez is a 60 yo man Day +144 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  BMT/MM:   Slow engraftment; cell dose was only 0.639x10^6. (Marrow [place] - known poor cell dose as failed chemo-mobilization 1/2019.)   - Stringent CR.     2.  HEME: Keep  Hgb>8g/dL, plt >10.   - GCSF today.   - Ongoing cytopenias - low cell dose with transplant. GCSF PRN.     3.  ID:    - Fevers of undetermined etiology: ID work up neg (see below).  favor viral illness.  No improvement in fevers on cefepime/azithro- 9/26 switched to doxycycline x 8 days. Complete.  - 9/30 had planned for lymphnode bx- cancel as adenopathy on exam is resolved. Suspect cause of fever is viral illness. EBV is 2k - PTLD is not likely.   - Hospital work up: 9/25 Crypto neg, parasite stain negative. Asp GM/fungitell neg, EBV <3000. RVP positive for rhinovirus- not cause of fevers given really no cold symptoms. CRP is elevated at 160. blood cultures negative.  UA unremarkable, and urine streptococcal and legionella antigens-negative.  - Continue prophylactic ACV, Pentamidine was last given 9/17. Azithromycin.   - On CMV vaccine trial.   - CMV and EBV pending.     4. GI: no complaints.   - GI prophylaxis: omeprazole.    - Alk phos elevated likely to GCSF as improves when doesn't receive doses.     5.  FEN/Renal: Creat, lytes wnl.   - PRN lyte replacement per standing protocol.         7.  Mood: Continue Paxil.    8. Rash: Favor due to viral illness. Improving without intervention (other than antibiotics).        RTC:  Friday with possible transfusions/gcsf    Cassie Ferrari PA-C  #[phone]

## 2019-10-09 LAB
CMV DNA SPEC NAA+PROBE-ACNC: NORMAL [IU]/ML
CMV DNA SPEC NAA+PROBE-LOG#: NORMAL {LOG_IU}/ML
EBV DNA # SPEC NAA+PROBE: 1203 {COPIES}/ML
EBV DNA SPEC NAA+PROBE-LOG#: 3.1 {LOG_COPIES}/ML
Lab: NORMAL
SPECIMEN SOURCE: NORMAL
SPECIMEN SOURCE: NORMAL
YEAST SPEC QL CULT: NO GROWTH

## 2019-10-10 LAB
ABO + RH BLD: NORMAL
ABO + RH BLD: NORMAL
BLD GP AB SCN SERPL QL: NORMAL
BLD PROD TYP BPU: NORMAL
BLD PROD TYP BPU: NORMAL
BLOOD BANK CMNT PATIENT-IMP: NORMAL
Lab: NORMAL
NUM BPU REQUESTED: 1
NUM BPU REQUESTED: 2
SPECIMEN EXP DATE BLD: NORMAL
SPECIMEN SOURCE: NORMAL
YEAST SPEC QL CULT: NO GROWTH

## 2019-10-11 ENCOUNTER — APPOINTMENT (OUTPATIENT)
Dept: LAB | Facility: CLINIC | Age: 59
End: 2019-10-11
Attending: INTERNAL MEDICINE
Payer: COMMERCIAL

## 2019-10-11 ENCOUNTER — ONCOLOGY VISIT (OUTPATIENT)
Dept: TRANSPLANT | Facility: CLINIC | Age: 59
End: 2019-10-11
Attending: INTERNAL MEDICINE
Payer: COMMERCIAL

## 2019-10-11 VITALS
OXYGEN SATURATION: 99 % | HEART RATE: 84 BPM | DIASTOLIC BLOOD PRESSURE: 79 MMHG | WEIGHT: 172.6 LBS | TEMPERATURE: 97.9 F | RESPIRATION RATE: 16 BRPM | SYSTOLIC BLOOD PRESSURE: 124 MMHG | BODY MASS INDEX: 24.21 KG/M2

## 2019-10-11 DIAGNOSIS — C90.01 MULTIPLE MYELOMA IN REMISSION (H): Primary | ICD-10-CM

## 2019-10-11 LAB
ABO + RH BLD: NORMAL
ABO + RH BLD: NORMAL
ANION GAP SERPL CALCULATED.3IONS-SCNC: 5 MMOL/L (ref 3–14)
BASOPHILS # BLD AUTO: 0 10E9/L (ref 0–0.2)
BASOPHILS NFR BLD AUTO: 0.2 %
BLD GP AB SCN SERPL QL: NORMAL
BLD PROD TYP BPU: NORMAL
BLD UNIT ID BPU: 0
BLOOD BANK CMNT PATIENT-IMP: NORMAL
BLOOD PRODUCT CODE: NORMAL
BPU ID: NORMAL
BUN SERPL-MCNC: 14 MG/DL (ref 7–30)
CALCIUM SERPL-MCNC: 9 MG/DL (ref 8.5–10.1)
CHLORIDE SERPL-SCNC: 106 MMOL/L (ref 94–109)
CO2 SERPL-SCNC: 28 MMOL/L (ref 20–32)
CREAT SERPL-MCNC: 0.86 MG/DL (ref 0.66–1.25)
DIFFERENTIAL METHOD BLD: ABNORMAL
EOSINOPHIL # BLD AUTO: 0.2 10E9/L (ref 0–0.7)
EOSINOPHIL NFR BLD AUTO: 3.3 %
ERYTHROCYTE [DISTWIDTH] IN BLOOD BY AUTOMATED COUNT: 15.9 % (ref 10–15)
GFR SERPL CREATININE-BSD FRML MDRD: >90 ML/MIN/{1.73_M2}
GLUCOSE SERPL-MCNC: 91 MG/DL (ref 70–99)
HCT VFR BLD AUTO: 27.4 % (ref 40–53)
HGB BLD-MCNC: 9 G/DL (ref 13.3–17.7)
IMM GRANULOCYTES # BLD: 0.1 10E9/L (ref 0–0.4)
IMM GRANULOCYTES NFR BLD: 2 %
LYMPHOCYTES # BLD AUTO: 1.3 10E9/L (ref 0.8–5.3)
LYMPHOCYTES NFR BLD AUTO: 28.3 %
MCH RBC QN AUTO: 37.8 PG (ref 26.5–33)
MCHC RBC AUTO-ENTMCNC: 32.8 G/DL (ref 31.5–36.5)
MCV RBC AUTO: 115 FL (ref 78–100)
MONOCYTES # BLD AUTO: 0.4 10E9/L (ref 0–1.3)
MONOCYTES NFR BLD AUTO: 8.5 %
NEUTROPHILS # BLD AUTO: 2.6 10E9/L (ref 1.6–8.3)
NEUTROPHILS NFR BLD AUTO: 57.7 %
NRBC # BLD AUTO: 0 10*3/UL
NRBC BLD AUTO-RTO: 0 /100
PLATELET # BLD AUTO: 17 10E9/L (ref 150–450)
POTASSIUM SERPL-SCNC: 4.8 MMOL/L (ref 3.4–5.3)
RBC # BLD AUTO: 2.38 10E12/L (ref 4.4–5.9)
SODIUM SERPL-SCNC: 139 MMOL/L (ref 133–144)
SPECIMEN EXP DATE BLD: NORMAL
TRANSFUSION STATUS PATIENT QL: NORMAL
WBC # BLD AUTO: 4.5 10E9/L (ref 4–11)

## 2019-10-11 PROCEDURE — 36415 COLL VENOUS BLD VENIPUNCTURE: CPT

## 2019-10-11 PROCEDURE — G0463 HOSPITAL OUTPT CLINIC VISIT: HCPCS | Mod: ZF

## 2019-10-11 PROCEDURE — 86901 BLOOD TYPING SEROLOGIC RH(D): CPT | Performed by: PHYSICIAN ASSISTANT

## 2019-10-11 PROCEDURE — 86900 BLOOD TYPING SEROLOGIC ABO: CPT | Performed by: PHYSICIAN ASSISTANT

## 2019-10-11 PROCEDURE — 80048 BASIC METABOLIC PNL TOTAL CA: CPT | Performed by: PHYSICIAN ASSISTANT

## 2019-10-11 PROCEDURE — 86850 RBC ANTIBODY SCREEN: CPT | Performed by: PHYSICIAN ASSISTANT

## 2019-10-11 PROCEDURE — 85025 COMPLETE CBC W/AUTO DIFF WBC: CPT | Performed by: PHYSICIAN ASSISTANT

## 2019-10-11 ASSESSMENT — PAIN SCALES - GENERAL: PAINLEVEL: NO PAIN (0)

## 2019-10-11 NOTE — PROGRESS NOTES
BMT Daily Progress Note      Patient ID: Angel Yanez is a 58 yo man Day +147 s/p 2nd auto for IgM kappa MM       Diagnosis MM Multiple myeloma  HCT Type Autologous    Prep Regimen Cytoxan  Melphalan   Donor Source Self     No  Primary BMT Provider Dr Lucio         INTERVAL  HISTORY   Rash improving, skin now just dry. No fevers. Eating is improving.  No n/v/d/c.  No bleeding.  No SOB.  No new issues other then continued frustration with low counts.      Review of Systems: 8 point ROS negative except as noted above.      Physical Exam  Blood pressure 124/79, pulse 84, temperature 97.9  F (36.6  C), temperature source Oral, resp. rate 16, weight 78.3 kg (172 lb 9.6 oz), SpO2 99 %.       Wt Readings from Last 5 Encounters:   10/08/19 78 kg (172 lb)   10/04/19 77.2 kg (170 lb 1.6 oz)   10/01/19 79 kg (174 lb 3.2 oz)   09/29/19 80.6 kg (177 lb 11.2 oz)   09/17/19 80.2 kg (176 lb 12.8 oz)       General: NAD, pleasant   Eyes:  sclera anicteric and not injected  Nose/Mouth/Throat: OP moist, no ulcerations   Lungs: CTAB- dry intermittent cough unchanged.   Cardiovascular: RRR, no M/R/G.    Lymphatics: no edema  Skin: scattered rash -macular rash on trunk that is pink in color. Hyperpigmented on the back.  Not raised and lesions amador and are not painful.   Neuro: non-focal, no gross deficits    Lab Results   Component Value Date    WBC 2.1 (L) 10/08/2019    ANEU 0.7 (L) 10/08/2019    HGB 9.1 (L) 10/08/2019    HCT 27.5 (L) 10/08/2019    PLT 16 (LL) 10/08/2019     10/08/2019    POTASSIUM 3.8 10/08/2019    CHLORIDE 105 10/08/2019    CO2 26 10/08/2019     (H) 10/08/2019    BUN 17 10/08/2019    CR 0.72 10/08/2019    MAG 1.7 09/27/2019    INR 1.19 (H) 09/24/2019       Assessment and Plan  Angel Yanez is a 58 yo man Day +147 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  BMT/MM:   Slow engraftment; cell dose was only 0.639x10^6. (Marrow [place]comb - known poor cell dose as failed chemo-mobilization 1/2019.)   -  Stringent CR.     2.  HEME: Keep Hgb>8g/dL, plt >10.   - GCSF as needed, last 10/8  - Ongoing cytopenias - low cell dose with transplant.      3.  ID:    - Fevers of undetermined etiology: ID work up neg (see below).  favor viral illness.  No improvement in fevers on cefepime/azithro- 9/26 switched to doxycycline x 8 days. Complete.  - 9/30 had planned for lymphnode bx- cancel as adenopathy on exam is resolved. Suspect cause of fever is viral illness. EBV is 2k - PTLD is not likely.   - Hospital work up: 9/25 Crypto neg, parasite stain negative. Asp GM/fungitell neg, EBV <3000. RVP positive for rhinovirus- not cause of fevers given really no cold symptoms. CRP is elevated at 160. blood cultures negative.  UA unremarkable, and urine streptococcal and legionella antigens-negative.  - Continue prophylactic ACV, Pentamidine was last given 9/17. Azithromycin.   - On CMV vaccine trial.   - CMV neg and EBV 1203 (10/8)     4. GI: no complaints.   - GI prophylaxis: omeprazole.    - Alk phos elevated likely to GCSF as improves when doesn't receive doses.     5.  FEN/Renal: Creat, lytes wnl.   - PRN lyte replacement per standing protocol.         7.  Mood: Continue Paxil.    8. Rash: Favor due to viral illness. Improving without intervention (other than antibiotics).        RTC:  Wednesday with possible transfusions/gcsf; pentamidine    Darlin Coulter pa-c  [phone]

## 2019-10-11 NOTE — NURSING NOTE
"Oncology Rooming Note    October 11, 2019 9:30 AM   Angel Yanez is a 59 year old male who presents for:    Chief Complaint   Patient presents with     Blood Draw     labs drawn by venipuncture by rn.  vital signs taken.     RECHECK     Return: MM      Initial Vitals: /79 (BP Location: Right arm, Patient Position: Sitting, Cuff Size: Adult Regular)   Pulse 84   Temp 97.9  F (36.6  C) (Oral)   Resp 16   Wt 78.3 kg (172 lb 9.6 oz)   SpO2 99%   BMI 24.21 kg/m   Estimated body mass index is 24.21 kg/m  as calculated from the following:    Height as of 9/24/19: 1.798 m (5' 10.8\").    Weight as of this encounter: 78.3 kg (172 lb 9.6 oz). Body surface area is 1.98 meters squared.  No Pain (0) Comment: Data Unavailable   No LMP for male patient.  Allergies reviewed: Yes  Medications reviewed: Yes    Medications: Medication refills not needed today.  Pharmacy name entered into EPIC:    [facility] - [place], [place] - [address]  [facility] - [place], [place] - [address] [facility]    Clinical concerns: None      Kathy Perez CMA              "

## 2019-10-14 ENCOUNTER — TELEPHONE (OUTPATIENT)
Dept: TRANSPLANT | Facility: CLINIC | Age: 59
End: 2019-10-14

## 2019-10-14 NOTE — TELEPHONE ENCOUNTER
Discussed with patient on phone. We recently learned from the  of the CMV triplex vaccine that testing for the used vials was inadvertently incomplete. Confirmatory testing is in progress and expected to result soon. Upon our review of all given doses to 17 patients, we did not find a safety signal. Patient expressed understanding.       Kody Mukherjee MD

## 2019-10-15 ENCOUNTER — INFUSION THERAPY VISIT (OUTPATIENT)
Dept: TRANSPLANT | Facility: CLINIC | Age: 59
End: 2019-10-15
Attending: INTERNAL MEDICINE
Payer: COMMERCIAL

## 2019-10-15 ENCOUNTER — TELEPHONE (OUTPATIENT)
Dept: TRANSPLANT | Facility: CLINIC | Age: 59
End: 2019-10-15

## 2019-10-15 ENCOUNTER — APPOINTMENT (OUTPATIENT)
Dept: LAB | Facility: CLINIC | Age: 59
End: 2019-10-15
Attending: INTERNAL MEDICINE
Payer: COMMERCIAL

## 2019-10-15 VITALS
BODY MASS INDEX: 24.27 KG/M2 | OXYGEN SATURATION: 98 % | RESPIRATION RATE: 18 BRPM | DIASTOLIC BLOOD PRESSURE: 70 MMHG | WEIGHT: 173 LBS | SYSTOLIC BLOOD PRESSURE: 105 MMHG | HEART RATE: 125 BPM | TEMPERATURE: 99.4 F

## 2019-10-15 VITALS
OXYGEN SATURATION: 95 % | DIASTOLIC BLOOD PRESSURE: 78 MMHG | SYSTOLIC BLOOD PRESSURE: 123 MMHG | TEMPERATURE: 101.5 F | HEART RATE: 110 BPM

## 2019-10-15 DIAGNOSIS — C90.01 MULTIPLE MYELOMA IN REMISSION (H): Primary | ICD-10-CM

## 2019-10-15 DIAGNOSIS — D70.9 NEUTROPENIC FEVER (H): ICD-10-CM

## 2019-10-15 DIAGNOSIS — R50.81 NEUTROPENIC FEVER (H): ICD-10-CM

## 2019-10-15 LAB
ABO + RH BLD: NORMAL
ABO + RH BLD: NORMAL
ANION GAP SERPL CALCULATED.3IONS-SCNC: 10 MMOL/L (ref 3–14)
ANISOCYTOSIS BLD QL SMEAR: SLIGHT
BASOPHILS # BLD AUTO: 0 10E9/L (ref 0–0.2)
BASOPHILS NFR BLD AUTO: 0 %
BLD GP AB SCN SERPL QL: NORMAL
BLD PROD TYP BPU: NORMAL
BLOOD BANK CMNT PATIENT-IMP: NORMAL
BUN SERPL-MCNC: 14 MG/DL (ref 7–30)
CALCIUM SERPL-MCNC: 8.7 MG/DL (ref 8.5–10.1)
CHLORIDE SERPL-SCNC: 103 MMOL/L (ref 94–109)
CO2 SERPL-SCNC: 24 MMOL/L (ref 20–32)
CREAT SERPL-MCNC: 0.96 MG/DL (ref 0.66–1.25)
DIFFERENTIAL METHOD BLD: ABNORMAL
EOSINOPHIL # BLD AUTO: 0.1 10E9/L (ref 0–0.7)
EOSINOPHIL NFR BLD AUTO: 4.3 %
ERYTHROCYTE [DISTWIDTH] IN BLOOD BY AUTOMATED COUNT: 16 % (ref 10–15)
FLUAV+FLUBV AG SPEC QL: NEGATIVE
FLUAV+FLUBV AG SPEC QL: NEGATIVE
GFR SERPL CREATININE-BSD FRML MDRD: 84 ML/MIN/{1.73_M2}
GLUCOSE SERPL-MCNC: 110 MG/DL (ref 70–99)
HCT VFR BLD AUTO: 25 % (ref 40–53)
HGB BLD-MCNC: 8.4 G/DL (ref 13.3–17.7)
LDH SERPL L TO P-CCNC: 206 U/L (ref 85–227)
LYMPHOCYTES # BLD AUTO: 0.5 10E9/L (ref 0.8–5.3)
LYMPHOCYTES NFR BLD AUTO: 20 %
MACROCYTES BLD QL SMEAR: PRESENT
MCH RBC QN AUTO: 36.7 PG (ref 26.5–33)
MCHC RBC AUTO-ENTMCNC: 33.6 G/DL (ref 31.5–36.5)
MCV RBC AUTO: 109 FL (ref 78–100)
METAMYELOCYTES # BLD: 0 10E9/L
METAMYELOCYTES NFR BLD MANUAL: 0.9 %
MONOCYTES # BLD AUTO: 0.2 10E9/L (ref 0–1.3)
MONOCYTES NFR BLD AUTO: 8.7 %
NEUTROPHILS # BLD AUTO: 1.7 10E9/L (ref 1.6–8.3)
NEUTROPHILS NFR BLD AUTO: 66.1 %
NUM BPU REQUESTED: 2
OVALOCYTES BLD QL SMEAR: SLIGHT
PLATELET # BLD AUTO: 12 10E9/L (ref 150–450)
PLATELET # BLD EST: ABNORMAL 10*3/UL
POIKILOCYTOSIS BLD QL SMEAR: SLIGHT
POTASSIUM SERPL-SCNC: 3.9 MMOL/L (ref 3.4–5.3)
PROCALCITONIN SERPL-MCNC: 0.29 NG/ML
RBC # BLD AUTO: 2.29 10E12/L (ref 4.4–5.9)
RSV AG SPEC QL: NEGATIVE
SODIUM SERPL-SCNC: 136 MMOL/L (ref 133–144)
SPECIMEN EXP DATE BLD: NORMAL
SPECIMEN SOURCE: NORMAL
SPECIMEN SOURCE: NORMAL
WBC # BLD AUTO: 2.5 10E9/L (ref 4–11)

## 2019-10-15 PROCEDURE — 25000128 H RX IP 250 OP 636: Mod: ZF | Performed by: PHYSICIAN ASSISTANT

## 2019-10-15 PROCEDURE — 86901 BLOOD TYPING SEROLOGIC RH(D): CPT | Performed by: INTERNAL MEDICINE

## 2019-10-15 PROCEDURE — G0463 HOSPITAL OUTPT CLINIC VISIT: HCPCS | Mod: 25,ZF

## 2019-10-15 PROCEDURE — 25000125 ZZHC RX 250: Mod: ZF | Performed by: PHYSICIAN ASSISTANT

## 2019-10-15 PROCEDURE — 96374 THER/PROPH/DIAG INJ IV PUSH: CPT

## 2019-10-15 PROCEDURE — 85025 COMPLETE CBC W/AUTO DIFF WBC: CPT | Performed by: PHYSICIAN ASSISTANT

## 2019-10-15 PROCEDURE — 86900 BLOOD TYPING SEROLOGIC ABO: CPT | Performed by: INTERNAL MEDICINE

## 2019-10-15 PROCEDURE — 86850 RBC ANTIBODY SCREEN: CPT | Performed by: INTERNAL MEDICINE

## 2019-10-15 PROCEDURE — 87633 RESP VIRUS 12-25 TARGETS: CPT | Performed by: PHYSICIAN ASSISTANT

## 2019-10-15 PROCEDURE — 87807 RSV ASSAY W/OPTIC: CPT | Performed by: PHYSICIAN ASSISTANT

## 2019-10-15 PROCEDURE — 96361 HYDRATE IV INFUSION ADD-ON: CPT

## 2019-10-15 PROCEDURE — 83615 LACTATE (LD) (LDH) ENZYME: CPT | Performed by: PHYSICIAN ASSISTANT

## 2019-10-15 PROCEDURE — 36415 COLL VENOUS BLD VENIPUNCTURE: CPT

## 2019-10-15 PROCEDURE — 80048 BASIC METABOLIC PNL TOTAL CA: CPT | Performed by: PHYSICIAN ASSISTANT

## 2019-10-15 PROCEDURE — 84145 PROCALCITONIN (PCT): CPT | Performed by: PHYSICIAN ASSISTANT

## 2019-10-15 PROCEDURE — 87804 INFLUENZA ASSAY W/OPTIC: CPT | Performed by: PHYSICIAN ASSISTANT

## 2019-10-15 PROCEDURE — 86923 COMPATIBILITY TEST ELECTRIC: CPT | Performed by: INTERNAL MEDICINE

## 2019-10-15 PROCEDURE — 87040 BLOOD CULTURE FOR BACTERIA: CPT | Performed by: PHYSICIAN ASSISTANT

## 2019-10-15 RX ORDER — CEFTRIAXONE SODIUM 2 G
2 VIAL (EA) INJECTION EVERY 24 HOURS
Status: CANCELLED
Start: 2019-10-16

## 2019-10-15 RX ORDER — ALBUTEROL SULFATE 5 MG/ML
2.5 SOLUTION RESPIRATORY (INHALATION)
Status: CANCELLED
Start: 2019-10-16

## 2019-10-15 RX ORDER — CEFTRIAXONE SODIUM 2 G
2 VIAL (EA) INJECTION EVERY 24 HOURS
Status: DISCONTINUED | OUTPATIENT
Start: 2019-10-15 | End: 2019-10-15 | Stop reason: HOSPADM

## 2019-10-15 RX ORDER — PENTAMIDINE ISETHIONATE 300 MG/300MG
300 INHALANT RESPIRATORY (INHALATION)
Status: CANCELLED
Start: 2019-10-16

## 2019-10-15 RX ORDER — CEFTRIAXONE SODIUM 2 G
2 VIAL (EA) INJECTION EVERY 24 HOURS
Status: CANCELLED
Start: 2019-10-15

## 2019-10-15 RX ORDER — HEPARIN SODIUM,PORCINE 10 UNIT/ML
5 VIAL (ML) INTRAVENOUS
Status: CANCELLED | OUTPATIENT
Start: 2019-10-16

## 2019-10-15 RX ADMIN — CEFTRIAXONE SODIUM 2 G: 2 INJECTION, POWDER, FOR SOLUTION INTRAMUSCULAR; INTRAVENOUS at 16:53

## 2019-10-15 RX ADMIN — SODIUM CHLORIDE 1000 ML: 9 INJECTION, SOLUTION INTRAVENOUS at 16:40

## 2019-10-15 ASSESSMENT — PAIN SCALES - GENERAL: PAINLEVEL: NO PAIN (0)

## 2019-10-15 NOTE — PROGRESS NOTES
"BMT Clinic Note      Patient ID: Angel Yanez is a 58 yo man Day +151 s/p 2nd auto for IgM kappa MM       Diagnosis MM Multiple myeloma  HCT Type Autologous    Prep Regimen Cytoxan  Melphalan  Primary BMT MD Dr. Lucio         INTERVAL  HISTORY   Pt called today with fevers since last night. Temp last night was 100.7F and he took 1 g tylenol and defervesced.  He woke up this AM and had a temp of 100.2F again took tylenol and then this afternoon had a temp of 102.9F with chills. Denies myalgias. He has a cough (not new); states he gets a cough when he gets fevers.  He has some GRECO but no SOB at rest.  He does not have a central line.  Skin dry, itchy but no rash. Appetite has been good and he denies n/v/d/c. No sore throat, ear pain, sinus pain/pressure. No bleeding. Not dizzy. Feels fatigued and \"out of shape\" but no dizziness.     Review of Systems: 8 point ROS negative except as noted above.      Physical Exam:   Blood pressure 105/70, pulse 125, temperature 99.4  F (37.4  C), temperature source Oral, resp. rate 18, weight 78.5 kg (173 lb), SpO2 98 %.    Wt Readings from Last 4 Encounters:   10/15/19 78.5 kg (173 lb)   10/11/19 78.3 kg (172 lb 9.6 oz)   10/08/19 78 kg (172 lb)   10/04/19 77.2 kg (170 lb 1.6 oz)     General: NAD, non-toxic  Eyes:  sclera anicteric and not injected  Nose/Mouth/Throat: OP moist, no ulcerations   Lungs: CTAB- dry intermittent cough unchanged.   Cardiovascular: mildly tachycardic, regular rhythm, no M/R/G.    Lymphatics: no edema. No LAD neck, axilla  Skin: dry, no rash  Neuro: non-focal, no gross deficits  No central access; PIV R arm    Labs:  Lab Results   Component Value Date    WBC 2.5 (L) 10/15/2019    ANEU 1.7 10/15/2019    HGB 8.4 (L) 10/15/2019    HCT 25.0 (L) 10/15/2019    PLT 12 (LL) 10/15/2019     10/15/2019    POTASSIUM 3.9 10/15/2019    CHLORIDE 103 10/15/2019    CO2 24 10/15/2019     (H) 10/15/2019    BUN 14 10/15/2019    CR 0.96 10/15/2019    MAG 1.7 " 09/27/2019    INR 1.19 (H) 09/24/2019    BILITOTAL 0.6 10/08/2019    AST 21 10/08/2019    ALT 43 10/08/2019    ALKPHOS 206 (H) 10/08/2019    PROTTOTAL 7.1 10/08/2019    ALBUMIN 3.5 10/08/2019       Assessment and Plan: Angel Yanez is a 58 yo man Day +151 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  BMT/MM:   Slow engraftment; cell dose was only 0.639x10^6. (Marrow [place] - known poor cell dose as failed chemo-mobilization 1/2019.)   - Stringent CR.     2.  HEME: Keep Hgb>8g/dL, plt >10.   - GCSF as needed. Last on 10.8.   - Ongoing cytopenias - low cell dose with transplant.      3.  ID: Fevers again x10/15. Not neutropenic.   Blood cx pending. Empiric Rocephin today, plan for tomorrow as well.  - RVP pending. Rapid influenza neg 10/15  Check LDH, EBV, CMV tomorrow. Consider repeat imaging to f/u adenopathy seen on 9/25 CCT.  - previous fevers of undetermined etiology: ID work up neg (see below).  favor viral illness.  No improvement in fevers on cefepime/azithro- 9/26 switched to doxycycline x 8 days. Complete.  - 9/30 had planned for lymph node bx- cancel as adenopathy on exam was resolved. Suspect cause of fever is viral illness. EBV is 2k - PTLD thought not likely last admission.  - Hospital work up: 9/25 Crypto neg, parasite stain negative. Asp GM/fungitell neg, EBV <3000. RVP positive for rhinovirus- not cause of fevers given really no cold symptoms. CRP is elevated at 160. blood cultures negative.  UA unremarkable, and urine streptococcal and legionella antigens-negative.  - Continue prophylactic ACV, Pentamidine was last given 9/17.    - On CMV vaccine trial.   - CMV neg and EBV 1203 (10/8)     4. GI: no complaints.   - GI prophylaxis: omeprazole.    - Alk phos elevated likely to GCSF as improves when doesn't receive doses.     5.  FEN/Renal: Creat, lytes wnl.   - PRN lyte replacement per standing protocol.       7.  Mood: Continue Paxil.    8. Rash: Favor due to viral illness. Essentially  resolved.     Final Plan:  Rocephin 2gm IV today  1L NS  RVP, rapid infl/RSV  RTC tomorrow for labs, f/u, Rocephin. Also possible transfusions, pentamidine    Lana Reynolds PA-C  [phone]

## 2019-10-15 NOTE — TELEPHONE ENCOUNTER
Pt is experiencing symptoms. Call back number is [phone].    Called МАРИНА at [phone]       [facility]  [phone]

## 2019-10-15 NOTE — NURSING NOTE
Chief Complaint   Patient presents with     Blood Draw     Labs drawn via IV placed by RN in lab. Line flushed with saline. VS taken.      RECHECK     pt here for recheck s/p bmt for MM

## 2019-10-15 NOTE — NURSING NOTE
Chief Complaint   Patient presents with     Blood Draw     Labs drawn via IV placed by RN in lab. Line flushed with saline. VS taken.      María Vidal RN

## 2019-10-15 NOTE — NURSING NOTE
Chief Complaint   Patient presents with     Infusion     pt here for IVF and rocephin s/p bmt for MM

## 2019-10-15 NOTE — TELEPHONE ENCOUNTER
I spoke with Mr. Yanez who notes that he has had a fever since last night.  Temp last night was 100.7F and he took 1 g tylenol and defervesced.  He woke up this AM and had a temp of 100.2F again took tylenol and now has a temp of 102.9F with chills.  He notes that he was shivering but denies myalgias.  He has a cough.  He tells me he gets a cough when he gets fevers.  He has some GRECO but no SOB at rest.  He is 'getting tired of all of this.'  He does not have a central line.  He is day +151 s/p auto sct but notably has delayed engraftment due to poor cell dose (0.639 x 10^6).    He has recently been neutropenic, notably on 10/8.  He received GCSF at that visit and has required it intermittently for last several weeks.    I have asked him to to come to clinic today for evaluation, labs and possible GCSF/abx.    Darlin Coulter pa-c  [phone]

## 2019-10-15 NOTE — PROGRESS NOTES
Infusion Nursing Note:  Angel Yanez presents today for 1 liter of IVF and IV Rocephin.    Patient seen by provider today: Yes: Lana Reynolds   present during visit today: Not Applicable.    Note: Patient here for add on IVF and rocephin. He tolerated infusions well without any issues or side affects. Also patient had respiratory swab orders as well and RN performed NP swab this and sent down to lab. Patient blood pressure improved after 1 liter of IVF. Also patient temp had increased again to 101.5. Provider notified and assesed. He will be back tomorrow for next visit and okay to leave clinic today after infusion appointment per provider and to take tylenol and fluids but if worse then go to ER.     Intravenous Access:  Peripheral IV placed.    Treatment Conditions:  Lab Results   Component Value Date    HGB 8.4 10/15/2019     Lab Results   Component Value Date    WBC 2.5 10/15/2019      Lab Results   Component Value Date    ANEU 2.6 10/11/2019     Lab Results   Component Value Date    PLT 12 10/15/2019      Lab Results   Component Value Date     10/15/2019                   Lab Results   Component Value Date    POTASSIUM 3.9 10/15/2019           Lab Results   Component Value Date    MAG 1.7 09/27/2019            Lab Results   Component Value Date    CR 0.96 10/15/2019                   Lab Results   Component Value Date    ZHEN 8.7 10/15/2019                Lab Results   Component Value Date    BILITOTAL 0.6 10/08/2019           Lab Results   Component Value Date    ALBUMIN 3.5 10/08/2019                    Lab Results   Component Value Date    ALT 43 10/08/2019           Lab Results   Component Value Date    AST 21 10/08/2019       Results reviewed, labs MET treatment parameters, ok to proceed with treatment.      Post Infusion Assessment:  Patient tolerated infusion without incident.  Blood return noted pre and post infusion.  No evidence of extravasations.  Access discontinued per protocol.        Discharge Plan:   Patient discharged in stable condition accompanied by: self.  Departure Mode: Ambulatory.    ANKITA SINCLAIR, RN, RN

## 2019-10-16 ENCOUNTER — HOSPITAL ENCOUNTER (INPATIENT)
Facility: CLINIC | Age: 59
LOS: 7 days | Discharge: HOME | DRG: 868 | End: 2019-10-23
Attending: INTERNAL MEDICINE | Admitting: INTERNAL MEDICINE
Payer: COMMERCIAL

## 2019-10-16 ENCOUNTER — APPOINTMENT (OUTPATIENT)
Dept: LAB | Facility: CLINIC | Age: 59
DRG: 868 | End: 2019-10-16
Attending: PHYSICIAN ASSISTANT
Payer: COMMERCIAL

## 2019-10-16 ENCOUNTER — INFUSION THERAPY VISIT (OUTPATIENT)
Dept: TRANSPLANT | Facility: CLINIC | Age: 59
DRG: 868 | End: 2019-10-16
Attending: PHYSICIAN ASSISTANT
Payer: COMMERCIAL

## 2019-10-16 ENCOUNTER — APPOINTMENT (OUTPATIENT)
Dept: CT IMAGING | Facility: CLINIC | Age: 59
DRG: 868 | End: 2019-10-16
Attending: NURSE PRACTITIONER
Payer: COMMERCIAL

## 2019-10-16 VITALS
SYSTOLIC BLOOD PRESSURE: 102 MMHG | HEART RATE: 120 BPM | RESPIRATION RATE: 16 BRPM | TEMPERATURE: 101.1 F | BODY MASS INDEX: 24.32 KG/M2 | OXYGEN SATURATION: 96 % | WEIGHT: 173.4 LBS | DIASTOLIC BLOOD PRESSURE: 69 MMHG

## 2019-10-16 VITALS
OXYGEN SATURATION: 3 % | DIASTOLIC BLOOD PRESSURE: 72 MMHG | HEART RATE: 100 BPM | RESPIRATION RATE: 14 BRPM | SYSTOLIC BLOOD PRESSURE: 114 MMHG | TEMPERATURE: 99.6 F

## 2019-10-16 DIAGNOSIS — C90.00 MULTIPLE MYELOMA NOT HAVING ACHIEVED REMISSION (H): Primary | ICD-10-CM

## 2019-10-16 DIAGNOSIS — C90.01 MULTIPLE MYELOMA IN REMISSION (H): ICD-10-CM

## 2019-10-16 DIAGNOSIS — C90.01 MULTIPLE MYELOMA IN REMISSION (H): Primary | ICD-10-CM

## 2019-10-16 PROBLEM — R50.81 FEVER AND NEUTROPENIA (H): Status: ACTIVE | Noted: 2019-10-16

## 2019-10-16 PROBLEM — D70.9 FEVER AND NEUTROPENIA (H): Status: ACTIVE | Noted: 2019-10-16

## 2019-10-16 LAB
ALBUMIN SERPL-MCNC: 3.2 G/DL (ref 3.4–5)
ALP SERPL-CCNC: 114 U/L (ref 40–150)
ALT SERPL W P-5'-P-CCNC: 22 U/L (ref 0–70)
ANION GAP SERPL CALCULATED.3IONS-SCNC: 11 MMOL/L (ref 3–14)
ANISOCYTOSIS BLD QL SMEAR: SLIGHT
APTT PPP: 43 SEC (ref 22–37)
AST SERPL W P-5'-P-CCNC: 13 U/L (ref 0–45)
BASOPHILS # BLD AUTO: 0 10E9/L (ref 0–0.2)
BASOPHILS NFR BLD AUTO: 0 %
BILIRUB SERPL-MCNC: 0.8 MG/DL (ref 0.2–1.3)
BLD PROD TYP BPU: NORMAL
BLD UNIT ID BPU: 0
BLOOD PRODUCT CODE: NORMAL
BPU ID: NORMAL
BUN SERPL-MCNC: 13 MG/DL (ref 7–30)
CALCIUM SERPL-MCNC: 8.5 MG/DL (ref 8.5–10.1)
CHLORIDE SERPL-SCNC: 102 MMOL/L (ref 94–109)
CO2 SERPL-SCNC: 21 MMOL/L (ref 20–32)
CREAT SERPL-MCNC: 1.01 MG/DL (ref 0.66–1.25)
DACRYOCYTES BLD QL SMEAR: SLIGHT
DIFFERENTIAL METHOD BLD: ABNORMAL
EOSINOPHIL # BLD AUTO: 0.1 10E9/L (ref 0–0.7)
EOSINOPHIL NFR BLD AUTO: 4.3 %
ERYTHROCYTE [DISTWIDTH] IN BLOOD BY AUTOMATED COUNT: 16.4 % (ref 10–15)
FLUAV H1 2009 PAND RNA SPEC QL NAA+PROBE: NEGATIVE
FLUAV H1 RNA SPEC QL NAA+PROBE: NEGATIVE
FLUAV H3 RNA SPEC QL NAA+PROBE: NEGATIVE
FLUAV RNA SPEC QL NAA+PROBE: NEGATIVE
FLUBV RNA SPEC QL NAA+PROBE: NEGATIVE
GFR SERPL CREATININE-BSD FRML MDRD: 80 ML/MIN/{1.73_M2}
GLUCOSE SERPL-MCNC: 147 MG/DL (ref 70–99)
HADV DNA SPEC QL NAA+PROBE: NEGATIVE
HADV DNA SPEC QL NAA+PROBE: NEGATIVE
HCT VFR BLD AUTO: 22.7 % (ref 40–53)
HGB BLD-MCNC: 7.6 G/DL (ref 13.3–17.7)
HMPV RNA SPEC QL NAA+PROBE: NEGATIVE
HPIV1 RNA SPEC QL NAA+PROBE: NEGATIVE
HPIV2 RNA SPEC QL NAA+PROBE: NEGATIVE
HPIV3 RNA SPEC QL NAA+PROBE: NEGATIVE
IGG SERPL-MCNC: 920 MG/DL (ref 695–1620)
INR PPP: 1.27 (ref 0.86–1.14)
LACTATE BLD-SCNC: 1.8 MMOL/L (ref 0.7–2)
LDH SERPL L TO P-CCNC: 214 U/L (ref 85–227)
LYMPHOCYTES # BLD AUTO: 0.3 10E9/L (ref 0.8–5.3)
LYMPHOCYTES NFR BLD AUTO: 9.6 %
MCH RBC QN AUTO: 36.9 PG (ref 26.5–33)
MCHC RBC AUTO-ENTMCNC: 33.5 G/DL (ref 31.5–36.5)
MCV RBC AUTO: 110 FL (ref 78–100)
MICROBIOLOGIST REVIEW: NORMAL
MONOCYTES # BLD AUTO: 0.2 10E9/L (ref 0–1.3)
MONOCYTES NFR BLD AUTO: 6.1 %
NEUTROPHILS # BLD AUTO: 2.4 10E9/L (ref 1.6–8.3)
NEUTROPHILS NFR BLD AUTO: 80 %
OVALOCYTES BLD QL SMEAR: SLIGHT
PLATELET # BLD AUTO: 9 10E9/L (ref 150–450)
PLATELET # BLD EST: ABNORMAL 10*3/UL
POIKILOCYTOSIS BLD QL SMEAR: SLIGHT
POTASSIUM SERPL-SCNC: 3.7 MMOL/L (ref 3.4–5.3)
PROT SERPL-MCNC: 6.8 G/DL (ref 6.8–8.8)
RBC # BLD AUTO: 2.06 10E12/L (ref 4.4–5.9)
RHINOVIRUS RNA SPEC QL NAA+PROBE: NEGATIVE
RSV RNA SPEC QL NAA+PROBE: NEGATIVE
RSV RNA SPEC QL NAA+PROBE: NEGATIVE
SODIUM SERPL-SCNC: 134 MMOL/L (ref 133–144)
SPECIMEN SOURCE: NORMAL
TRANSFUSION STATUS PATIENT QL: NORMAL
WBC # BLD AUTO: 3 10E9/L (ref 4–11)

## 2019-10-16 PROCEDURE — 85025 COMPLETE CBC W/AUTO DIFF WBC: CPT | Performed by: PHYSICIAN ASSISTANT

## 2019-10-16 PROCEDURE — 71250 CT THORAX DX C-: CPT

## 2019-10-16 PROCEDURE — 40000268 ZZH STATISTIC NO CHARGES: Mod: ZF

## 2019-10-16 PROCEDURE — 85730 THROMBOPLASTIN TIME PARTIAL: CPT | Performed by: INTERNAL MEDICINE

## 2019-10-16 PROCEDURE — P9037 PLATE PHERES LEUKOREDU IRRAD: HCPCS | Performed by: NURSE PRACTITIONER

## 2019-10-16 PROCEDURE — 85610 PROTHROMBIN TIME: CPT | Performed by: NURSE PRACTITIONER

## 2019-10-16 PROCEDURE — 25000125 ZZHC RX 250: Mod: ZF | Performed by: PHYSICIAN ASSISTANT

## 2019-10-16 PROCEDURE — P9040 RBC LEUKOREDUCED IRRADIATED: HCPCS | Performed by: INTERNAL MEDICINE

## 2019-10-16 PROCEDURE — 82784 ASSAY IGA/IGD/IGG/IGM EACH: CPT | Performed by: PHYSICIAN ASSISTANT

## 2019-10-16 PROCEDURE — 83615 LACTATE (LD) (LDH) ENZYME: CPT | Performed by: PHYSICIAN ASSISTANT

## 2019-10-16 PROCEDURE — 86900 BLOOD TYPING SEROLOGIC ABO: CPT | Performed by: INTERNAL MEDICINE

## 2019-10-16 PROCEDURE — 85610 PROTHROMBIN TIME: CPT | Performed by: INTERNAL MEDICINE

## 2019-10-16 PROCEDURE — 25000128 H RX IP 250 OP 636: Mod: ZF | Performed by: PHYSICIAN ASSISTANT

## 2019-10-16 PROCEDURE — 87799 DETECT AGENT NOS DNA QUANT: CPT | Performed by: PHYSICIAN ASSISTANT

## 2019-10-16 PROCEDURE — 83605 ASSAY OF LACTIC ACID: CPT | Performed by: INTERNAL MEDICINE

## 2019-10-16 PROCEDURE — 25000132 ZZH RX MED GY IP 250 OP 250 PS 637: Mod: ZF | Performed by: PHYSICIAN ASSISTANT

## 2019-10-16 PROCEDURE — 36415 COLL VENOUS BLD VENIPUNCTURE: CPT | Performed by: INTERNAL MEDICINE

## 2019-10-16 PROCEDURE — 80053 COMPREHEN METABOLIC PANEL: CPT | Performed by: PHYSICIAN ASSISTANT

## 2019-10-16 PROCEDURE — 25000132 ZZH RX MED GY IP 250 OP 250 PS 637: Performed by: NURSE PRACTITIONER

## 2019-10-16 PROCEDURE — 87040 BLOOD CULTURE FOR BACTERIA: CPT | Performed by: PHYSICIAN ASSISTANT

## 2019-10-16 PROCEDURE — 20600000 ZZH R&B BMT

## 2019-10-16 RX ORDER — HEPARIN SODIUM,PORCINE 10 UNIT/ML
5 VIAL (ML) INTRAVENOUS
Status: CANCELLED | OUTPATIENT
Start: 2019-10-17

## 2019-10-16 RX ORDER — ACYCLOVIR 800 MG/1
800 TABLET ORAL 2 TIMES DAILY
Status: DISCONTINUED | OUTPATIENT
Start: 2019-10-16 | End: 2019-10-23 | Stop reason: HOSPADM

## 2019-10-16 RX ORDER — ACETAMINOPHEN 325 MG/1
325-650 TABLET ORAL EVERY 4 HOURS PRN
Status: DISCONTINUED | OUTPATIENT
Start: 2019-10-16 | End: 2019-10-23 | Stop reason: HOSPADM

## 2019-10-16 RX ORDER — MAGNESIUM SULFATE HEPTAHYDRATE 40 MG/ML
4 INJECTION, SOLUTION INTRAVENOUS EVERY 4 HOURS PRN
Status: DISCONTINUED | OUTPATIENT
Start: 2019-10-16 | End: 2019-10-23 | Stop reason: HOSPADM

## 2019-10-16 RX ORDER — PENTAMIDINE ISETHIONATE 300 MG/300MG
300 INHALANT RESPIRATORY (INHALATION)
Status: CANCELLED
Start: 2019-10-17

## 2019-10-16 RX ORDER — PAROXETINE 20 MG/1
20 TABLET, FILM COATED ORAL EVERY MORNING
Status: DISCONTINUED | OUTPATIENT
Start: 2019-10-17 | End: 2019-10-23 | Stop reason: HOSPADM

## 2019-10-16 RX ORDER — DIPHENHYDRAMINE HCL 25 MG
25-50 CAPSULE ORAL EVERY 6 HOURS PRN
Status: DISCONTINUED | OUTPATIENT
Start: 2019-10-16 | End: 2019-10-23 | Stop reason: HOSPADM

## 2019-10-16 RX ORDER — AZITHROMYCIN 250 MG/1
250 TABLET, FILM COATED ORAL DAILY
Status: ON HOLD | COMMUNITY
End: 2019-10-23

## 2019-10-16 RX ORDER — ALBUTEROL SULFATE 5 MG/ML
2.5 SOLUTION RESPIRATORY (INHALATION)
Status: CANCELLED
Start: 2019-10-17

## 2019-10-16 RX ORDER — POTASSIUM CL/LIDO/0.9 % NACL 10MEQ/0.1L
10 INTRAVENOUS SOLUTION, PIGGYBACK (ML) INTRAVENOUS
Status: DISCONTINUED | OUTPATIENT
Start: 2019-10-16 | End: 2019-10-23 | Stop reason: HOSPADM

## 2019-10-16 RX ORDER — ACETAMINOPHEN 325 MG/1
1000 TABLET ORAL EVERY 6 HOURS PRN
Status: ON HOLD | COMMUNITY
End: 2019-11-14

## 2019-10-16 RX ORDER — ACETAMINOPHEN 325 MG/1
975 TABLET ORAL ONCE
Status: COMPLETED | OUTPATIENT
Start: 2019-10-16 | End: 2019-10-16

## 2019-10-16 RX ORDER — CEFTRIAXONE SODIUM 2 G
2 VIAL (EA) INJECTION EVERY 24 HOURS
Status: DISCONTINUED | OUTPATIENT
Start: 2019-10-16 | End: 2019-10-16 | Stop reason: HOSPADM

## 2019-10-16 RX ORDER — POTASSIUM CHLORIDE 7.45 MG/ML
10 INJECTION INTRAVENOUS
Status: DISCONTINUED | OUTPATIENT
Start: 2019-10-16 | End: 2019-10-23 | Stop reason: HOSPADM

## 2019-10-16 RX ORDER — VITAMIN B COMPLEX
2000 TABLET ORAL DAILY
Status: DISCONTINUED | OUTPATIENT
Start: 2019-10-17 | End: 2019-10-23 | Stop reason: HOSPADM

## 2019-10-16 RX ORDER — ACETAMINOPHEN 325 MG/1
650 TABLET ORAL EVERY 6 HOURS PRN
Status: DISCONTINUED | OUTPATIENT
Start: 2019-10-16 | End: 2019-10-23 | Stop reason: HOSPADM

## 2019-10-16 RX ORDER — CELECOXIB 100 MG/1
200 CAPSULE ORAL 2 TIMES DAILY PRN
Status: DISCONTINUED | OUTPATIENT
Start: 2019-10-16 | End: 2019-10-23 | Stop reason: HOSPADM

## 2019-10-16 RX ORDER — PROCHLORPERAZINE MALEATE 5 MG
5-10 TABLET ORAL EVERY 6 HOURS PRN
Status: DISCONTINUED | OUTPATIENT
Start: 2019-10-16 | End: 2019-10-23 | Stop reason: HOSPADM

## 2019-10-16 RX ORDER — PENTAMIDINE ISETHIONATE 300 MG/300MG
300 INHALANT RESPIRATORY (INHALATION)
Status: DISCONTINUED | OUTPATIENT
Start: 2019-10-17 | End: 2019-10-23 | Stop reason: HOSPADM

## 2019-10-16 RX ORDER — CEFTRIAXONE SODIUM 2 G
2 VIAL (EA) INJECTION EVERY 24 HOURS
Status: CANCELLED
Start: 2019-10-17

## 2019-10-16 RX ORDER — LIDOCAINE 40 MG/G
CREAM TOPICAL
Status: DISCONTINUED | OUTPATIENT
Start: 2019-10-16 | End: 2019-10-23 | Stop reason: HOSPADM

## 2019-10-16 RX ORDER — POTASSIUM CHLORIDE 29.8 MG/ML
20 INJECTION INTRAVENOUS
Status: DISCONTINUED | OUTPATIENT
Start: 2019-10-16 | End: 2019-10-23 | Stop reason: HOSPADM

## 2019-10-16 RX ORDER — POTASSIUM CHLORIDE 750 MG/1
20-40 TABLET, EXTENDED RELEASE ORAL
Status: DISCONTINUED | OUTPATIENT
Start: 2019-10-16 | End: 2019-10-23 | Stop reason: HOSPADM

## 2019-10-16 RX ORDER — LIDOCAINE 40 MG/G
CREAM TOPICAL
Status: DISCONTINUED | OUTPATIENT
Start: 2019-10-16 | End: 2019-10-16

## 2019-10-16 RX ORDER — CEFEPIME HYDROCHLORIDE 2 G/1
2 INJECTION, POWDER, FOR SOLUTION INTRAVENOUS ONCE
Status: COMPLETED | OUTPATIENT
Start: 2019-10-16 | End: 2019-10-16

## 2019-10-16 RX ORDER — ALBUTEROL SULFATE 0.83 MG/ML
2.5 SOLUTION RESPIRATORY (INHALATION)
Status: DISCONTINUED | OUTPATIENT
Start: 2019-10-17 | End: 2019-10-23 | Stop reason: HOSPADM

## 2019-10-16 RX ORDER — POTASSIUM CHLORIDE 1.5 G/1.58G
20-40 POWDER, FOR SOLUTION ORAL
Status: DISCONTINUED | OUTPATIENT
Start: 2019-10-16 | End: 2019-10-23 | Stop reason: HOSPADM

## 2019-10-16 RX ADMIN — ACETAMINOPHEN 975 MG: 325 TABLET ORAL at 14:15

## 2019-10-16 RX ADMIN — CEFTRIAXONE SODIUM 2 G: 2 INJECTION, POWDER, FOR SOLUTION INTRAMUSCULAR; INTRAVENOUS at 10:20

## 2019-10-16 RX ADMIN — SODIUM CHLORIDE 1000 ML: 9 INJECTION, SOLUTION INTRAVENOUS at 09:50

## 2019-10-16 RX ADMIN — ACETAMINOPHEN 650 MG: 325 TABLET, FILM COATED ORAL at 19:41

## 2019-10-16 RX ADMIN — ACYCLOVIR 800 MG: 800 TABLET ORAL at 19:41

## 2019-10-16 RX ADMIN — CEFEPIME 2 G: 1 INJECTION, POWDER, FOR SOLUTION INTRAMUSCULAR; INTRAVENOUS at 15:16

## 2019-10-16 RX ADMIN — ACETAMINOPHEN 325 MG: 325 TABLET, FILM COATED ORAL at 23:52

## 2019-10-16 ASSESSMENT — PAIN DESCRIPTION - DESCRIPTORS: DESCRIPTORS: ACHING

## 2019-10-16 ASSESSMENT — PAIN SCALES - GENERAL
PAINLEVEL: NO PAIN (0)
PAINLEVEL: NO PAIN (0)

## 2019-10-16 ASSESSMENT — ACTIVITIES OF DAILY LIVING (ADL): ADLS_ACUITY_SCORE: 10

## 2019-10-16 NOTE — NURSING NOTE
Admission SBAR:    Situation:  Why is the patient being admitted?  Fever    Background:  BP (!) 155/78   Pulse 112   Temp 103.1  F (39.5  C) (Oral)   Resp 18   SpO2 98%   Major diagnosis: multiple myeloma  Type of donor: Autologous  Type of stem cells: PBSC  Relapsed? no  Summary of Interventions (if medications were administered, please specify time and color of lumen if applicable):    Antimicrobials? Yes: Rocephin 2 gm at 10:20, Cefepime 2 gm at 15:16    Transfusions? Yes- one unit platelets for a platelet count of 9, two units of red blood cells for a Hgb of 7.6.    Fluids? Yes: 1 liter normal saline    Neupogen? No    Other Medications? Yes: Tylenol 975 mg at 14:15    Electrolytes? No    Blood cultures? Yes-1 Site(s): Peripheral    UA? no    UC? no    Stool culture? No    Respiratory cultures? No    Current type and cross? Yes, good thru 10-19        Assessment:  Potential Falls risk? No  Accompanied by: self  Other Assessment: Patient has been spiking fevers at home every four hours the past two days and has been taking 1 gram of oral Tylenol at home when needed.  He is very fatigued.  He has a non-productive cough.  He denies nausea, but vomitied once last night and one this morning.  He denies diarrhea.  He has been tachycardic in the clinic with regular rhythm.      Recommendations: Admit for further evaluation  Report called to Unit:   Report called to Nurse:    Transported by: [facility]  Via: [facility]

## 2019-10-16 NOTE — PROGRESS NOTES
BMT Clinic Note      Patient ID: Angel Yanez is a 58 yo man Day +152 s/p 2nd auto for IgM kappa MM       Diagnosis MM Multiple myeloma  HCT Type Autologous    Prep Regimen Cytoxan  Melphalan  Primary BMT MD Dr. Lucio         INTERVAL  HISTORY   Tired. Fevers continue. Responsive to tylenol. Nothing localizing other then vomited x 2. Not currently nauseated. No diarrhea. He's not sure what that was about. Frustrated about prolonged course.      Review of Systems: 8 point ROS negative except as noted above.      Physical Exam:   Blood pressure 102/69, pulse 120, temperature 101.1  F (38.4  C), temperature source Oral, resp. rate 16, weight 78.7 kg (173 lb 6.4 oz), SpO2 96 %.    Wt Readings from Last 4 Encounters:   10/16/19 78.7 kg (173 lb 6.4 oz)   10/15/19 78.5 kg (173 lb)   10/11/19 78.3 kg (172 lb 9.6 oz)   10/08/19 78 kg (172 lb)     General: NAD, non-toxic  Eyes:  sclera anicteric and not injected  Nose/Mouth/Throat: OP moist, no ulcerations   Lungs: CTAB  Cardiovascular: mildly tachycardic, regular rhythm, no M/R/G.    Lymphatics: no edema. No LAD neck, axilla  Skin: dry, no rash--completely resolved  Neuro: non-focal, no gross deficits  No central access; PIV R arm    Labs:  Lab Results   Component Value Date    WBC 3.0 (L) 10/16/2019    ANEU 2.4 10/16/2019    HGB 7.6 (L) 10/16/2019    HCT 22.7 (L) 10/16/2019    PLT 9 (LL) 10/16/2019     10/16/2019    POTASSIUM 3.7 10/16/2019    CHLORIDE 102 10/16/2019    CO2 21 10/16/2019     (H) 10/16/2019    BUN 13 10/16/2019    CR 1.01 10/16/2019    MAG 1.7 09/27/2019    INR 1.19 (H) 09/24/2019    BILITOTAL 0.8 10/16/2019    AST 13 10/16/2019    ALT 22 10/16/2019    ALKPHOS 114 10/16/2019    PROTTOTAL 6.8 10/16/2019    ALBUMIN 3.2 (L) 10/16/2019       Assessment and Plan: Angel Yanez is a 58 yo man Day +152 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  BMT/MM:   Slow engraftment; cell dose was only 0.639x10^6. ([place] - known poor cell dose as  failed chemo-mobilization 1/2019.)   - Stringent CR.     2.  HEME: Keep Hgb>8g/dL, plt >10.   - GCSF as needed. Last on 10.8.    - Blood and plts today.  - Ongoing cytopenias - low cell dose with transplant.       3.  ID: Fevers again since 10/15. Not neutropenic. ANC 2400. Really nothing localizing??  - Blood cx NGTD 10/15 and pending today. Empiric Rocephin started 10/15.   - RVP pending. Rapid influenza/RSV neg 10/15  - LDH normal, EBV, CMV pending from today. Consider repeat imaging to f/u mediastinal and axillary adenopathy seen on 9/25 CCT pending EBV results. If EBV up will get full CT's with contrast if not would maybe just get non-contrast chest to f/u the adenopathy. He has no palpable nodes and lungs are clear?   - Procal 0.29    - IGG pending.  - previous fevers of undetermined etiology: ID work up neg (see below).  favor viral illness.  No improvement in fevers on cefepime/azithro- 9/26 switched to doxycycline x 8 days. Complete.  - 9/30 had planned for lymph node bx- cancel as adenopathy on exam was resolved. Suspect cause of fever is viral illness. EBV is 2k - PTLD thought not likely last admission.  - Hospital work up: 9/25 Crypto neg, parasite stain negative. Asp GM/fungitell neg, EBV <3000. RVP positive for rhinovirus- not cause of fevers given really no cold symptoms. CRP is elevated at 160. blood cultures negative.  UA unremarkable, and urine streptococcal and legionella antigens-negative.  - Continue prophylactic ACV, Pentamidine was last given 9/17.  Give pentamidine tomorrow.   - On CMV vaccine trial.   - CMV neg and EBV 1203 (10/8)     4. GI: no complaints.   - GI prophylaxis: omeprazole.    - Alk phos elevated likely to GCSF as improves when doesn't receive doses.     5.  FEN/Renal: Creat, lytes wnl.   - PRN lyte replacement per standing protocol.       7.  Mood: Continue Paxil.    8. Rash: Favor due to viral illness. Essentially resolved.     RTC: Admit to 5C after antibiotics/blood  products patient looking more toxic and temp 103. Will admit for broad spectrum antibiotics and imaging.    Cassie Ferrari PA-C  #[id number]

## 2019-10-16 NOTE — H&P
BMT History & Physical     Admission  Name: Angel Yanez  Date:  10/16/2019  Service: BMT   Chief Complaint:  fevers  Informant:  Patient and Chart  Resuscitation Status: Full Code    Patient ID:  Angel Yanez is a 59 year old male, currently day 152 of his autologous hematopoietic cell transplant for IgM Kappa MM    Diagnosis MM Multiple myeloma  HCT Type Autologous    Prep Regimen Cytoxan  Melphalan  Primary BMT MD Dr. Lucio      INTERVAL  HISTORY   Tired. Fevers continue. Responsive to tylenol. Nothing localizing other then vomited x 2. Not currently nauseated. No diarrhea. He's not sure what that was about. Frustrated about prolonged course.       Review of Systems: 8 point ROS negative except as noted above.    Physical Exam:   There were no vitals taken for this visit.         Wt Readings from Last 4 Encounters:   10/16/19 78.7 kg (173 lb 6.4 oz)   10/15/19 78.5 kg (173 lb)   10/11/19 78.3 kg (172 lb 9.6 oz)   10/08/19 78 kg (172 lb)      General: NAD, non-toxic  Eyes:  sclera anicteric and not injected  Nose/Mouth/Throat: OP moist, no ulcerations   Lungs: CTAB  Cardiovascular: mildly tachycardic, regular rhythm, no M/R/G.    Lymphatics: no edema. No LAD neck, axilla  Skin: dry, no rash--completely resolved  Neuro: non-focal, no gross deficits  No central access; PIV R arm    Lab Results   Component Value Date    WBC 3.0 (L) 10/16/2019    ANEU 2.4 10/16/2019    HGB 7.6 (L) 10/16/2019    HCT 22.7 (L) 10/16/2019    PLT 9 (LL) 10/16/2019     10/16/2019    POTASSIUM 3.7 10/16/2019    CHLORIDE 102 10/16/2019    CO2 21 10/16/2019     (H) 10/16/2019    BUN 13 10/16/2019    CR 1.01 10/16/2019    MAG 1.7 09/27/2019    INR 1.19 (H) 09/24/2019    BILITOTAL 0.8 10/16/2019    AST 13 10/16/2019    ALT 22 10/16/2019    ALKPHOS 114 10/16/2019    PROTTOTAL 6.8 10/16/2019    ALBUMIN 3.2 (L) 10/16/2019       Family History:   Family History   Problem Relation Age of Onset     Diabetes Mother        Social  History:   Social History     Socioeconomic History     Marital status:      Spouse name: Not on file     Number of children: Not on file     Years of education: Not on file     Highest education level: Not on file   Occupational History     Not on file   Social Needs     Financial resource strain: Not on file     Food insecurity:     Worry: Not on file     Inability: Not on file     Transportation needs:     Medical: Not on file     Non-medical: Not on file   Tobacco Use     Smoking status: Former Smoker     Smokeless tobacco: Never Used   Substance and Sexual Activity     Alcohol use: Yes     Comment: occasionaly     Drug use: No     Sexual activity: Not on file   Lifestyle     Physical activity:     Days per week: Not on file     Minutes per session: Not on file     Stress: Not on file   Relationships     Social connections:     Talks on phone: Not on file     Gets together: Not on file     Attends [employer] service: Not on file     Active member of club or organization: Not on file     Attends meetings of clubs or organizations: Not on file     Relationship status: Not on file     Intimate partner violence:     Fear of current or ex partner: Not on file     Emotionally abused: Not on file     Physically abused: Not on file     Forced sexual activity: Not on file   Other Topics Concern     Parent/sibling w/ CABG, MI or angioplasty before 65F 55M? Not Asked   Social History Narrative     Not on file       Past Medical History:   Past Medical History:   Diagnosis Date     GERD (gastroesophageal reflux disease)      H/O autologous stem cell transplant (H) 02/2005     Hyperlipidemia      Multiple myeloma (H) 2004     PONV (postoperative nausea and vomiting)      Psoriasis      Psoriatic arthritis (H)         Past Surgical History:   Past Surgical History:   Procedure Laterality Date     ARTHROPLASTY HIP       COLONOSCOPY       HERNIA REPAIR       IR CVC TUNNEL PLACEMENT > 5 YRS OF AGE  1/22/2019     IR CVC  TUNNEL PLACEMENT > 5 YRS OF AGE  5/16/2019     IR CVC TUNNEL REMOVAL LEFT  2/20/2019     IR CVC TUNNEL REMOVAL RIGHT  7/23/2019     IR FOLLOW UP VISIT OUTPATIENT  1/24/2019     ORTHOPEDIC SURGERY       PROCURE BONE MARROW N/A 5/14/2019    Procedure: Bone Marrow [place];  Surgeon: Antwan Erickson MD;  Location: [facility] OR     TRANSPLANT         Allergies:   Allergies   Allergen Reactions     Chlorhexidine Itching     Avalide Hives     Chloroxylenol Rash     Technicare solution     Lorazepam Hives     Other reaction(s): Hives       Moxifloxacin Hives       Home Medications      Prior to Admission medications    Medication Sig Start Date End Date Taking? Authorizing Provider   acyclovir (ZOVIRAX) 800 MG tablet Take 1 tablet (800 mg) by mouth 2 times daily 5/28/19   Latrice Pride PA-C   calcium carbonate (CALCIUM CARBONATE) 600 MG tablet Take 2 tablets by mouth daily     Reported, Patient   Cholecalciferol (VITAMIN D3) 2000 units CAPS Take 2,000 Units by mouth daily     Reported, Patient   PARoxetine (PAXIL) 20 MG tablet Take 20 mg by mouth every morning    Reported, Patient   pentamidine (NEBUPENT) 300 MG neb solution Inhale 300 mg into the lungs every 28 days Will get in BMT clinic. Next due 10/17/19 9/29/19   Jadyn Marrufo PA-C     ASSESSMENT BY SYSTEMS   Angel Yanez is a 59 year old male d+152 s/p autologous transplant for MM readmitted with 3 days of fevers.    1.  BMT/MM:   Slow engraftment; cell dose was only 0.639x10^6. ([place] - known poor cell dose as failed chemo-mobilization 1/2019.)   - Stringent CR.     2.  HEME: Keep Hgb>8g/dL, plt >10.   - GCSF as needed. Last on 10.8.    - Blood and plts today in clinic.  - Ongoing cytopenias - low cell dose with transplant.       3.  ID: Fevers again since 10/15. Not neutropenic. ANC 2400. Really nothing localizing??  - Blood cx NGTD 10/15 and pending today. Empiric Rocephin started 10/15.   - RVP pending. Rapid influenza/RSV neg  10/15  - LDH normal, EBV, CMV pending from today. Consider repeat imaging to f/u mediastinal and axillary adenopathy seen on 9/25 CCT pending EBV results. If EBV up will get full CT's with contrast if not would maybe just get non-contrast chest to f/u the adenopathy. He has no palpable nodes and lungs are clear?   - Procal 0.29    - IGG pending.  - previous fevers of undetermined etiology: ID work up neg (see below).  favor viral illness.  No improvement in fevers on cefepime/azithro- 9/26 switched to doxycycline x 8 days. Complete.  - 9/30 had planned for lymph node bx- cancel as adenopathy on exam was resolved. Suspect cause of fever is viral illness. EBV is 2k - PTLD thought not likely last admission.  - Hospital work up: 9/25 Crypto neg, parasite stain negative. Asp GM/fungitell neg, EBV <3000. RVP positive for rhinovirus- not cause of fevers given really no cold symptoms. CRP is elevated at 160. blood cultures negative.  UA unremarkable, and urine streptococcal and legionella antigens-negative.  - Continue prophylactic ACV, Pentamidine was last given 9/17.  Give pentamidine tomorrow.   - On CMV vaccine trial.   - CMV neg and EBV 1203 (10/8)     4. GI: no complaints.   - GI prophylaxis: omeprazole.    - Alk phos elevated likely to GCSF as improves when doesn't receive doses.      5.  FEN/Renal: Creat, lytes wnl.   - PRN lyte replacement per standing protocol.       7.  Mood: Continue Paxil.     8. Rash: Favor due to viral illness. Essentially resolved.     RTC: Admit to 5C after antibiotics/blood products patient looking more toxic and temp 103. Will admit for broad spectrum antibiotics and imaging.     Jadyn Jolly

## 2019-10-16 NOTE — PROGRESS NOTES
.Infusion Nursing Note:  Angel Yanez presents today for scheduled infusion, add-on transfusion.    Patient seen by provider today: Yes: Cassie Ferrari   present during visit today: Not Applicable.    Note: Labs were monitored.    Intravenous Access:  Peripheral IV placed.    Treatment Conditions:  Patient received scheduled IV push Rocephin.  Per Provider order, he received an add-on IV fluid infusion over one hour.  He received one unit of platelets for a platelet count of 9.   He received two units of red blood cells for a Hgb of 7.6.        Post Infusion Assessment:  Patient tolerated infusion and one unit of platelets and one unit of red blood cells.  At the end of the second unit of red blood cells (about 25 mls remaining), Patient spiked a temp of 103.1.  Transfusion was stopped, Provider notified.  Patient received 975 mg oral Tylenol and Provider assessed Patient.  Provider felt this was not a reaction to the blood product as he has been spiking fevers at home every four hours the past few days (not this high.)  Provider is admitting Patient to hospital for further work-up.  Patient denies any pain.  Patient received cefepime 2 gm infusion at 15:16.      Discharge Plan:   Patient is being admitted to hospital and will be transported by [employer].  See SBAR note for details.    CLARA ELLSWORTH, RN, RN        Verbal report called to Emelina RN - charge nurse on 5C. PT to be transported, via [employer].  PT's vitals at time of this nurses' charting: /72 , temp 99.6 and sats of 93% room air.  Pt denies current pain and remains alert and oriented to plan of care for admission to .

## 2019-10-16 NOTE — NURSING NOTE
"Oncology Rooming Note    October 16, 2019 11:24 AM   Angel Yanez is a 59 year old male who presents for:    Chief Complaint   Patient presents with     Blood Draw     labs drawn with IV start by rn.  vital signs taken.     RECHECK     provider visit, scheduled infusion, possible transfusion s/p bmt txp for multiple myeloma     Initial Vitals: /69 (BP Location: Right arm, Patient Position: Sitting, Cuff Size: Adult Regular)   Pulse 120   Temp 101.1  F (38.4  C) (Oral)   Resp 16   Wt 78.7 kg (173 lb 6.4 oz)   SpO2 96%   BMI 24.32 kg/m   Estimated body mass index is 24.32 kg/m  as calculated from the following:    Height as of 9/24/19: 1.798 m (5' 10.8\").    Weight as of this encounter: 78.7 kg (173 lb 6.4 oz). Body surface area is 1.98 meters squared.  No Pain (0) Comment: Data Unavailable   No LMP for male patient.  Allergies reviewed: Yes  Medications reviewed: Yes    Medications: Medication refills not needed today.  Pharmacy name entered into EPIC:    [facility] - [place], [place] - [address]  [facility] - [place], [place] - [address] [place] [address]    Clinical concerns: Patient is fatigued and continues to have intermittent fevers at home.  He is taking Tylenol regularly.  He did have one emesis last evening and one this morning.  He denies diarrhea.      CLARA ELLSWORTH RN              "

## 2019-10-16 NOTE — PROGRESS NOTES
..Patient admitted to:  Admitted from:[facility]  Arrived by:car  Reason for admission:fever  Patient accompanied by:wife  Belongings:clothing,shoes  Teaching:admission

## 2019-10-17 ENCOUNTER — APPOINTMENT (OUTPATIENT)
Dept: GENERAL RADIOLOGY | Facility: CLINIC | Age: 59
DRG: 868 | End: 2019-10-17
Attending: INTERNAL MEDICINE
Payer: COMMERCIAL

## 2019-10-17 ENCOUNTER — APPOINTMENT (OUTPATIENT)
Dept: CARDIOLOGY | Facility: CLINIC | Age: 59
DRG: 868 | End: 2019-10-17
Attending: PHYSICIAN ASSISTANT
Payer: COMMERCIAL

## 2019-10-17 ENCOUNTER — APPOINTMENT (OUTPATIENT)
Dept: NUCLEAR MEDICINE | Facility: CLINIC | Age: 59
DRG: 868 | End: 2019-10-17
Attending: PHYSICIAN ASSISTANT
Payer: COMMERCIAL

## 2019-10-17 PROBLEM — B27.00 EBV (EPSTEIN-BARR VIRUS) VIREMIA: Status: ACTIVE | Noted: 2019-05-29

## 2019-10-17 PROBLEM — Z52.3 BONE MARROW DONOR: Status: RESOLVED | Noted: 2019-05-14 | Resolved: 2019-10-17

## 2019-10-17 PROBLEM — R50.9 FEVER IN ADULT: Status: ACTIVE | Noted: 2019-10-16

## 2019-10-17 PROBLEM — D80.1 HYPOGAMMAGLOBULINEMIA (H): Status: ACTIVE | Noted: 2019-01-18

## 2019-10-17 PROBLEM — L40.50 PSORIASIS WITH ARTHROPATHY (H): Status: ACTIVE | Noted: 2018-11-02

## 2019-10-17 PROBLEM — R59.1 LYMPHADENOPATHY: Status: ACTIVE | Noted: 2019-09-24

## 2019-10-17 LAB
ABO + RH BLD: NORMAL
ABO + RH BLD: NORMAL
ANION GAP SERPL CALCULATED.3IONS-SCNC: 10 MMOL/L (ref 3–14)
ANISOCYTOSIS BLD QL SMEAR: ABNORMAL
BASOPHILS # BLD AUTO: 0 10E9/L (ref 0–0.2)
BASOPHILS NFR BLD AUTO: 0 %
BLD GP AB SCN SERPL QL: NORMAL
BLD PROD TYP BPU: NORMAL
BLD PROD TYP BPU: NORMAL
BLD UNIT ID BPU: 0
BLOOD BANK CMNT PATIENT-IMP: NORMAL
BLOOD PRODUCT CODE: NORMAL
BPU ID: NORMAL
BUN SERPL-MCNC: 12 MG/DL (ref 7–30)
CALCIUM SERPL-MCNC: 8.6 MG/DL (ref 8.5–10.1)
CHLORIDE SERPL-SCNC: 105 MMOL/L (ref 94–109)
CMV DNA SPEC NAA+PROBE-ACNC: NORMAL [IU]/ML
CMV DNA SPEC NAA+PROBE-LOG#: NORMAL {LOG_IU}/ML
CO2 SERPL-SCNC: 22 MMOL/L (ref 20–32)
CREAT SERPL-MCNC: 1.08 MG/DL (ref 0.66–1.25)
DIFFERENTIAL METHOD BLD: ABNORMAL
EBV DNA # SPEC NAA+PROBE: 2725 {COPIES}/ML
EBV DNA SPEC NAA+PROBE-LOG#: 3.4 {LOG_COPIES}/ML
EOSINOPHIL # BLD AUTO: 0.2 10E9/L (ref 0–0.7)
EOSINOPHIL NFR BLD AUTO: 4.4 %
ERYTHROCYTE [DISTWIDTH] IN BLOOD BY AUTOMATED COUNT: 20.1 % (ref 10–15)
GFR SERPL CREATININE-BSD FRML MDRD: 73 ML/MIN/{1.73_M2}
GLUCOSE SERPL-MCNC: 107 MG/DL (ref 70–99)
HCT VFR BLD AUTO: 29.5 % (ref 40–53)
HGB BLD-MCNC: 9.7 G/DL (ref 13.3–17.7)
LYMPHOCYTES # BLD AUTO: 1.6 10E9/L (ref 0.8–5.3)
LYMPHOCYTES NFR BLD AUTO: 35.4 %
MACROCYTES BLD QL SMEAR: PRESENT
MCH RBC QN AUTO: 34.9 PG (ref 26.5–33)
MCHC RBC AUTO-ENTMCNC: 32.9 G/DL (ref 31.5–36.5)
MCV RBC AUTO: 106 FL (ref 78–100)
MONOCYTES # BLD AUTO: 0.1 10E9/L (ref 0–1.3)
MONOCYTES NFR BLD AUTO: 1.8 %
NEUTROPHILS # BLD AUTO: 2.7 10E9/L (ref 1.6–8.3)
NEUTROPHILS NFR BLD AUTO: 58.4 %
NUM BPU REQUESTED: 1
OVALOCYTES BLD QL SMEAR: ABNORMAL
PLATELET # BLD AUTO: 14 10E9/L (ref 150–450)
PLATELET # BLD EST: ABNORMAL 10*3/UL
POIKILOCYTOSIS BLD QL SMEAR: ABNORMAL
POTASSIUM SERPL-SCNC: 4 MMOL/L (ref 3.4–5.3)
RBC # BLD AUTO: 2.78 10E12/L (ref 4.4–5.9)
SODIUM SERPL-SCNC: 137 MMOL/L (ref 133–144)
SPECIMEN EXP DATE BLD: NORMAL
SPECIMEN SOURCE: NORMAL
TRANSFUSION STATUS PATIENT QL: NORMAL
TRANSFUSION STATUS PATIENT QL: NORMAL
WBC # BLD AUTO: 4.6 10E9/L (ref 4–11)

## 2019-10-17 PROCEDURE — 87081 CULTURE SCREEN ONLY: CPT | Performed by: NURSE PRACTITIONER

## 2019-10-17 PROCEDURE — 86900 BLOOD TYPING SEROLOGIC ABO: CPT | Performed by: INTERNAL MEDICINE

## 2019-10-17 PROCEDURE — P9037 PLATE PHERES LEUKOREDU IRRAD: HCPCS | Performed by: PHYSICIAN ASSISTANT

## 2019-10-17 PROCEDURE — 86901 BLOOD TYPING SEROLOGIC RH(D): CPT | Performed by: INTERNAL MEDICINE

## 2019-10-17 PROCEDURE — A9567 TECHNETIUM TC-99M AEROSOL: HCPCS | Performed by: INTERNAL MEDICINE

## 2019-10-17 PROCEDURE — 80048 BASIC METABOLIC PNL TOTAL CA: CPT | Performed by: INTERNAL MEDICINE

## 2019-10-17 PROCEDURE — 94642 AEROSOL INHALATION TREATMENT: CPT

## 2019-10-17 PROCEDURE — 25000125 ZZHC RX 250: Performed by: NURSE PRACTITIONER

## 2019-10-17 PROCEDURE — 27210210 NM LUNG SCAN VENTILATION AND PERFUSION

## 2019-10-17 PROCEDURE — 25000128 H RX IP 250 OP 636: Performed by: NURSE PRACTITIONER

## 2019-10-17 PROCEDURE — 86850 RBC ANTIBODY SCREEN: CPT | Performed by: INTERNAL MEDICINE

## 2019-10-17 PROCEDURE — 34300033 ZZH RX 343: Performed by: INTERNAL MEDICINE

## 2019-10-17 PROCEDURE — 25000132 ZZH RX MED GY IP 250 OP 250 PS 637: Performed by: NURSE PRACTITIONER

## 2019-10-17 PROCEDURE — 85025 COMPLETE CBC W/AUTO DIFF WBC: CPT | Performed by: INTERNAL MEDICINE

## 2019-10-17 PROCEDURE — 36415 COLL VENOUS BLD VENIPUNCTURE: CPT | Performed by: INTERNAL MEDICINE

## 2019-10-17 PROCEDURE — 93306 TTE W/DOPPLER COMPLETE: CPT | Mod: 26 | Performed by: INTERNAL MEDICINE

## 2019-10-17 PROCEDURE — A9540 TC99M MAA: HCPCS | Performed by: INTERNAL MEDICINE

## 2019-10-17 PROCEDURE — 71046 X-RAY EXAM CHEST 2 VIEWS: CPT

## 2019-10-17 PROCEDURE — 87040 BLOOD CULTURE FOR BACTERIA: CPT | Performed by: INTERNAL MEDICINE

## 2019-10-17 PROCEDURE — 25000125 ZZHC RX 250: Performed by: INTERNAL MEDICINE

## 2019-10-17 PROCEDURE — 93306 TTE W/DOPPLER COMPLETE: CPT

## 2019-10-17 PROCEDURE — 87103 BLOOD FUNGUS CULTURE: CPT | Performed by: INTERNAL MEDICINE

## 2019-10-17 PROCEDURE — 20600000 ZZH R&B BMT

## 2019-10-17 RX ORDER — IOPAMIDOL 755 MG/ML
105 INJECTION, SOLUTION INTRAVASCULAR ONCE
Status: COMPLETED | OUTPATIENT
Start: 2019-10-17 | End: 2019-10-18

## 2019-10-17 RX ADMIN — KIT FOR THE PREPARATION OF TECHNETIUM TC 99M ALBUMIN AGGREGATED 6.9 MILLICURIE: 2.5 INJECTION, POWDER, FOR SOLUTION INTRAVENOUS at 16:30

## 2019-10-17 RX ADMIN — PAROXETINE HYDROCHLORIDE HEMIHYDRATE 20 MG: 20 TABLET, FILM COATED ORAL at 09:32

## 2019-10-17 RX ADMIN — ACYCLOVIR 800 MG: 800 TABLET ORAL at 09:30

## 2019-10-17 RX ADMIN — Medication 600 MG: at 09:31

## 2019-10-17 RX ADMIN — PENTAMIDINE ISETHIONATE 300 MG: 300 INHALANT RESPIRATORY (INHALATION) at 09:42

## 2019-10-17 RX ADMIN — CEFEPIME HYDROCHLORIDE 2 G: 2 INJECTION, POWDER, FOR SOLUTION INTRAVENOUS at 17:47

## 2019-10-17 RX ADMIN — Medication 600 MG: at 19:55

## 2019-10-17 RX ADMIN — MELATONIN 2000 UNITS: at 09:30

## 2019-10-17 RX ADMIN — ALBUTEROL SULFATE 2.5 MG: 2.5 SOLUTION RESPIRATORY (INHALATION) at 09:42

## 2019-10-17 RX ADMIN — ACETAMINOPHEN 650 MG: 325 TABLET, FILM COATED ORAL at 09:31

## 2019-10-17 RX ADMIN — ACETAMINOPHEN 650 MG: 325 TABLET, FILM COATED ORAL at 17:35

## 2019-10-17 RX ADMIN — ACETAMINOPHEN 650 MG: 325 TABLET, FILM COATED ORAL at 13:14

## 2019-10-17 RX ADMIN — CEFEPIME HYDROCHLORIDE 2 G: 2 INJECTION, POWDER, FOR SOLUTION INTRAVENOUS at 00:30

## 2019-10-17 RX ADMIN — ACETAMINOPHEN 650 MG: 325 TABLET, FILM COATED ORAL at 04:21

## 2019-10-17 RX ADMIN — ACYCLOVIR 800 MG: 800 TABLET ORAL at 19:55

## 2019-10-17 RX ADMIN — CEFEPIME HYDROCHLORIDE 2 G: 2 INJECTION, POWDER, FOR SOLUTION INTRAVENOUS at 09:34

## 2019-10-17 RX ADMIN — KIT FOR THE PREPARATION OF TECHNETIUM TC 99M PENTETATE 2 MCI.: 20 INJECTION, POWDER, LYOPHILIZED, FOR SOLUTION INTRAVENOUS; RESPIRATORY (INHALATION) at 16:09

## 2019-10-17 RX ADMIN — ACETAMINOPHEN 650 MG: 325 TABLET, FILM COATED ORAL at 23:35

## 2019-10-17 ASSESSMENT — ACTIVITIES OF DAILY LIVING (ADL)
ADLS_ACUITY_SCORE: 10

## 2019-10-17 ASSESSMENT — PAIN DESCRIPTION - DESCRIPTORS: DESCRIPTORS: ACHING

## 2019-10-17 NOTE — PROGRESS NOTES
BMT Daily Progress Note     Mr. Guille Yanez is 59 y.o male with hx of Multiple Myeloma, s/p 2nd Autologous pbsct 5/2019. Readmitted for 2nd time in 1 month with fevers of unknown source.   Overnight events: Febrile but stable. Notable work of breathing just laying in bed. He tells me that when a fever is coming on he feels like this, then chills then spikes. He tells me he is fatigued, cough with fevers but otherwise no new complaints.     Review of Systems: 10 point ROS negative except as noted above.    Physical Exam:   Blood pressure 118/73, pulse 118, temperature 101.7  F (38.7  C), temperature source Oral, resp. rate 20, weight 77.7 kg (171 lb 6.4 oz), SpO2 93 %.  General: NAD   Eyes:  FARRAH, sclera anicteric   Nose/Mouth/Throat: OP clear, buccal mucosa moist, no ulcerations   Lungs: CTA bilaterally  Cardiovascular: RRR- appears to ave increased work of breathing but lungs CTAB. NO murmurs no JVD noted on exam.   Abdominal/Rectal: +BS, soft, NT, ND, No HSM   Lymphatics: no edema  Skin: no rashes or petechaie  Neuro: A&O   Additional Findings: Hsieh site NT, no drainage.  Labs:  Lab Results   Component Value Date    WBC 4.6 10/17/2019    ANEU 2.7 10/17/2019    HGB 9.7 (L) 10/17/2019    HCT 29.5 (L) 10/17/2019    PLT 14 (LL) 10/17/2019     10/17/2019    POTASSIUM 4.0 10/17/2019    CHLORIDE 105 10/17/2019    CO2 22 10/17/2019     (H) 10/17/2019    BUN 12 10/17/2019    CR 1.08 10/17/2019    MAG 1.7 09/27/2019    INR 1.27 (H) 10/16/2019       Imaging: Reviewed  Microbiology:  Reviewed    Assessment and Plan:   Angel Yanez is a 59 year old male d+153 s/p autologous transplant for MM readmitted with 3 days of fevers. Recently admitted 9/23-9/30 for same thing.      1.  BMT/MM:   Slow engraftment; cell dose was only 0.639x10^6. ([place] - known poor cell dose as failed chemo-mobilization 1/2019.)   - Stringent CR.     2.  HEME: Keep Hgb>8g/dL, plt >10. Increase plt parameter >50 for possible  lymphnode bx  - GCSF as needed. Last on 10/8/19.   - Ongoing cytopenias - low cell dose with transplant.       3.  ID: Fevers again since 10/15.   Not neutropenic. ANC 2400. Really nothing localizing?   - Increased work of breathing on exam- Obtain Vq scan to assess for PE  - 10/16 repeat chest CT: persistent mediastinal adenopathy no paranchymal disease    - Consult IR for lymphnode bx- they feel axilla lymphadenopathy is too small for anything but FNA and mediastinal adenopathy is much more suspicious looking.   - Consult Interventional pulmonary for consideration of transbronchial lymphnode bx  - ID consult regarding persistent fevers.   - Blood cx NGTD 10/15   - Empiric Rocephin started 10/15.   - RVP rhinovirus, still shedding.   - Rapid influenza/RSV neg.   - LDH normal, EBV, CMV pending 10/16.   - Procal 0.29    - Ig   - Continue empiric cefepime.   - Hospital work up:  Crypto neg, parasite stain negative. Asp GM/fungitell neg, EBV <3000. RVP positive for rhinovirus- not cause of fevers given really no cold symptoms. CRP is elevated at 160. blood cultures negative.  UA unremarkable, and urine streptococcal and legionella antigens-negative.  - Continue prophylactic ACV  - PJP prophy due- give pentamidine today.   - On CMV vaccine trial.   - CMV neg and EBV 1203 (10/8)     4. Pulm: Not hypoxic prior stay. Looks like increased work of breathing  - rule out cardiomyopathy - Obtain ECHO looking for right heart strain  - OBtian V/Q scan to rule out PE      5. GI: no complaints.   - GI prophylaxis: omeprazole.    - Alk phos elevated likely to GCSF as improves when doesn't receive doses.      5.  FEN/Renal: Creat, lytes wnl.   - PRN lyte replacement per standing protocol.       7.  Mood: Continue Paxil.     8. Rash: Favor due to viral illness. Essentially resolved.     Keep admitted for work up of fever of unknown origin.     Myrna Marrufo PA-C  [phone]    Attending Summary  The patient was seen and  examined by me separate from the midlevel provider.The note above reflects my assessment and plan. I have personally reviewed today's labs,vital and radiology results. The points of care that were added by me are:     History and physical  S/p 2nd ASCT for MM in 5/2019.  Admitted for recurrent fevers and severe fatigue and dyspnea.    On exam:  Head/mouth/neck: Oropharynx clear.  No lymphadenopathy.  Heart: Regular rate and rhythm.  No murmurs rubs or gallops.  Lungs: Lungs are clear bilaterally.  Abdomen: Abdomen is soft nontender nondistended.  Intact bowel sounds.  Ext: No edema.  Skin: No rash.    Pertinent Labs:  Lab Results   Component Value Date    WBC 4.6 10/17/2019     Lab Results   Component Value Date    RBC 2.78 10/17/2019     Lab Results   Component Value Date    HGB 9.7 10/17/2019     Lab Results   Component Value Date    HCT 29.5 10/17/2019     No components found for: MCT  Lab Results   Component Value Date     10/17/2019     Lab Results   Component Value Date    MCH 34.9 10/17/2019     Lab Results   Component Value Date    MCHC 32.9 10/17/2019     Lab Results   Component Value Date    RDW 20.1 10/17/2019     Lab Results   Component Value Date    PLT 14 10/17/2019     ASSESSMENT AND PLAN  1.  MM:  S/p 2nd ASCT  2.  Fevers:  CT chest shows scattered adenopathy.  EBV PCR pending.  Consult pulmonary for EBUS biopsy of a mediastinal node.  Empiric antibiotics for now.  3.  Dyspnea:  VQ scan pending.    Anuel Lanier MD

## 2019-10-17 NOTE — PROGRESS NOTES
10/17/19 1426   Vitals   Temp 102.9  F (39.4  C)      Spoke with Myrna ORTEGA regarding temperature spike at the end of platelet transfusion, no other signs of transfusion reaction; will not work up for transfusion reaction at this time.

## 2019-10-17 NOTE — PROGRESS NOTES
CLINICAL NUTRITION SERVICES - ASSESSMENT NOTE     Nutrition Prescription    RECOMMENDATIONS FOR MDs/PROVIDERS TO ORDER:  None at this time.    Malnutrition Status:    Non-severe malnutrition in the context of chronic illness.    Recommendations already ordered by Registered Dietitian (RD):  Rajan somers @ 2 PM.    Future/Additional Recommendations:  1. Monitor PO intake and see if pt would like to add additional supplements throughout day.  2. Consider calorie counts if no PO intake recorded in flowsheets x2 days.  3. Monitor stool output and consider bowel regimen if no BM recorded x2 days.  4. Monitor status of rash and initiate wound protocol as needed.     REASON FOR ASSESSMENT  Angel Yanez is a/an 59 year old male assessed by the dietitian for Admission Nutrition Risk Screen for unintentional loss of 10# or more in the past two months and reduced oral intake over the last month and Nutrition Rashawn < 3    NUTRITION HISTORY  Pt has not been seen by nutrition services PTA.  Pt admitted for fever x3 days. He is tired and frustrated about prolonged course; no GI complaints other than 2 episodes of emesis upon admission. Per pt, first episode was likely triggered by intense coughing attack, and second episode likely d/t eating & drinking too much at once.  Per pt, an average day of eating PTA was coffee, banana, and breakfast bar for breakfast, fast food for lunch, and a home-cooked dinner at home. Intermittent water drinking throughout the day, no pop or juice intake. No snacks in between meals. Per pt, has noticed some all-over weight loss, maybe 5#. Clothes still fit him the same but overall feels lighter. Pt is frustrated that he is hospitalized again for fever, says this is the 4th time he has been readmitted for fever.    CURRENT NUTRITION ORDERS  Diet: High Kcal/High Protein; Room Service appropriate  Intake/Tolerance: Today, pt says appetite is ok, but not where it used to be. Pt was eating  "breakfast when I entered room which consisted of a banana and breakfast sandwich, which is all he can handle at this time.     LABS  Labs reviewed    MEDICATIONS  Medications reviewed  Acyclovir  Calcium carbonate    ANTHROPOMETRICS  Height: No heights available yet from current admission; using last height obtained:  09/24/19 1.798 m (5' 10.8\")   Most Recent Weight: 77.7 kg (171 lb 6.4 oz)    IBW: 75 kg  BMI: Normal BMI  Weight History: 1% weight loss in 5 mo. (6/19-9/19)  Wt Readings from Last 10 Encounters:   10/17/19 77.7 kg (171 lb 6.4 oz)   10/16/19 78.7 kg (173 lb 6.4 oz)   10/15/19 78.5 kg (173 lb)   10/11/19 78.3 kg (172 lb 9.6 oz)   10/08/19 78 kg (172 lb)   10/04/19 77.2 kg (170 lb 1.6 oz)   10/01/19 79 kg (174 lb 3.2 oz)   09/29/19 80.6 kg (177 lb 11.2 oz)   09/17/19 80.2 kg (176 lb 12.8 oz)   09/10/19 79.9 kg (176 lb 1.6 oz)     06/17/19 78.7 kg (173 lb 6.4 oz)     Dosing Weight: 78 kg (based on 77.7 kg 10/17/19).    ASSESSED NUTRITION NEEDS  Estimated Energy Needs: 9957-0572 kcals/day (25 - 30 kcals/kg)  Justification: Maintenance  Estimated Protein Needs:  grams protein/day (1.2 - 1.5 grams of pro/kg)  Justification: Maintenance  Estimated Fluid Needs: 2051-9635 mL/day (1 mL/kcal)   Justification: Maintenance and Per provider pending fluid status    PHYSICAL/OTHER FINDINGS  Mild rash \"essentially resolved\" per H&P note 10/16.  No BM noted yet 10/17.  See malnutrition section below.    MALNUTRITION  % Intake: </= 50% for >/= 5 days (severe)  % Weight Loss: Weight loss does not meet criteria  Subcutaneous Fat Loss: Facial region:  mild and Lower arm:  mild  Muscle Loss: Temporal:  mild, Lower arm  (forearm): mild, Upper leg (quadricep, hamstring):  mild and Posterior calf:  mild  Fluid Accumulation/Edema: None noted  Malnutrition Diagnosis: Non-severe malnutrition in the context of chronic illness.    NUTRITION DIAGNOSIS  Inadequate oral intake related to ongoing fatigue, mild fat and muscle loss, " and cancer treatment, as evidenced by </=50% PO intake for >/=5 days and pt report of losing 5#.    INTERVENTIONS  Implementation  Nutrition education for nutrition relationship to health/disease and Medical food supplement therapy: Advised pt try to continue eating 3 meals/day as able. If unable to consume solid foods, suggested he try a nutritional supplement either as the meal or between meals. Pt agreeable to vanilla Boost shake @ 2 PM. Pt understands the importance of eating when he is able, to avoid intensifying any feelings of GI illness.    Goals  Patient to consume % of nutritionally adequate meal trays TID, or the equivalent with supplements/snacks.     Monitoring/Evaluation  Progress toward goals will be monitored and evaluated per protocol.    Anita Baird  Dietetic Intern    I read and agree with the above assessment and interventions.   Radha Deng MS, RD, , LD.  Pager: [phone]

## 2019-10-17 NOTE — CONSULTS
IR consulted for possible LN biopsy    This is a 59 year old male d+152 s/p autologous transplant for MM readmitted with 3 days of fevers. CT of the chest shows mediastinal and axillary lymphadenopathy concerning for lymphoproliferative disease and/or PTLD. IR was consulted for possible biopsy. Of note, pt's platelet count today is 14.    Case and imaging was reviewed with Dr. Hays from IR. Mediastinal nodes appear more abnormal than the axillary nodes, we recommend discussing with pulmonary for possible endobronchial bx. Axillary LNs could be biopsied, but they are too small for a core needle sampling. We could only offer FNA which may not allow for the specific dx testing necessary for PTLD.    Recommendations were reviewed with Myrna Marrufo PA-C.    Hilary Berry, ELLE, APRN  Interventional Radiology   Pager: [phone]

## 2019-10-17 NOTE — CONSULTS
[facility]  Transplant Infectious Disease Consult Note - New Patient     Patient:  Angel Yanez, Date of birth 1960, Medical record number [id number]  Date of Visit:  10/17/2019  Consult requested by Dr. Anuel Lanier for evaluation of fever & adenopathy         Assessment and Recommendations:   Recommendations:  - Swab NP for Bordetella pertussis.   - Send blood for Karius, Francisella tularemia, Brucella, Toxoplasma IgM/IgG, Bartonella henselae, Coxiella, Parvovirus IgM/IgG/PCR, Leptospira IgM, inflammatory markers.   - Send urine for blastomyces antigen and histo antigen.  - Send lymph node biopsy for gram stain and culture, von and fungal culture, AFB stain and AFB culture as well as path/cytology/EBV JEN.  - I would be in favor of starting itraconazole 200 mg TID, as I anticipate that he should be discharged with a trial of a histo-active agent.     Thank you very much for this consultation. Transplant Infectious Disease will continue to follow with you.    Assessment:  Angel Yanez is a 60 yo male who is s/p autologous PBSCT (Cytoxan/Mephalan) on 5/17/19 for MM  Infectious Disease issues include:  - Lymphadenopathy. Multiple enlarged mediastinal and axillary lymph nodes. He is again admitted, this time he will have EUS of mediastinal lymph node. I think it is possible that he has histoplasmosis, even with neg prior U Histo Ag, based on the presence of calcifications in spleen & some of the hilar nodes.   - Fever, likely tied to lymphadenopathy. On ~ 9/22/2019 he developed the abrupt onset of high fever without localizable symptoms and found to have new mediastinal and axillary lymphadenopathy. He had minimal improvement with IV cefepime after almost 48 hours of treatment and his procalcitonin was negative. Fungitell neg on 9/24/2019. Histo U Ag neg on 9/27/2019. Cryptococcus negative. Cocci, blasto, histo serologies neg. Aspergillus galactomannan negative. Parasite stain  neg for Anaplasma, Babesia. Quantiferon neg. Chlamydia group IgM antibodies neg. Bartonella red antibodies neg. See next tier of testing as listed in recs above.   - Cough since transplant. May be related to fever and lymphadenopathy. If he has histoplasmosis, and some of the fluconazole that he received with transplant partially suppressed histo, that could explain why he did not always have continuous worsening of symptoms. He had flucon 1/23-2/4/2019, 5/18-8/27/2019. Cough is also very prominent with pertussis, so would swab NP for Bordetella.   - EBV viremia, very low grade. 9/25/19 blood EBV PCR viral load result was 2,215 copies/ml which, along with a modest 9/25/19 serum LDH of 257, was not very indicative of emergent PTLD. Viral load 1,023 on 10/8/2019.   - Multiple calcified left hilar nodes and splenic granulomas, which would indicate prior histoplasmosis based on living in this part of the country.   - Rhinovirus shedding. 9/24/2019, 7/22/2019, 5/30/2019.   - Hx of port-related Pseudomonas bacteremia 7/21 (Hsieh removed, treated with 10 days of IV cefepime)  - QTc interval: 451 on 8/23/19 (pt has allergy to moxifloxacin)  - Pneumocystis prophylaxis: pentamidine 10/17/2019.  - Viral serostatus & prophylaxis: CMV+, EBV+, HSV2+ (seroreversion documented 5/16/2019); acyclovir   - Fungal prophylaxis: none  - Immunization status: needs flu shot  - Gamma globulin status: hypogammaglobulinemic. 507 on 9/25/2019. 920 on 10/16/2019.   - Isolation status: Good hand hygiene.    Erica Kelley MD, MD   Pager [phone]         History of Infectious Disease Illness:   Agnel Yanez is a 58 yo male who is s/p autologous PBSCT (Cytoxan/Mephalan) on 5/17/19 for MM. He was feeling his usual self until 9/22/2019, when he became fatigued. On 9/23/19 he felt wiped out and had a fever of 102F. He was admitted & started on IV cefepime. CT chest with new axillary and mediastinal lymphadenopathy. He reported a dry  cough since the time of transplant. He had been in [place] ([place] 3-4 weeks prior and went on 2-3 mile hike. There were some birds that were eating food from their hands but no traumatic interaction with the birds. He did not go anywhere farther south such as [place] / [place]. He lives in [place] with his wife & dog. He mowed the lawn but wore a mask. No recent excavations / construction on his home. 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 His wife runs a small day care out of their home. He was admitted with similar symptoms 3 weeks ago, discharged with 7 days of doxycycline. He is again admitted, this time he will have EUS of mediastinal lymph node. Continues to have cough when the fever starts, not productive. Feels very deconditioned for him, sachin with stairs. Inpatient, he was 103.9F at 7:44 pm last night, received ceftriaxone 10/15/2019 - 10/16/2019, then cefepime on 10/16/2019. No known TB exposures, no exposure to birthing animals. Last trip to [place] was several years ago. There are calcified hilar nodes, suggesting at a minimum prior histo, even though histo antibodies neg in 9/2019 (?post-transplant false neg). If he has histoplasmosis, and some of the fluconazole that he received with transplant partially suppressed histo, that could explain why he did not always have continuous worsening of symptoms. He had flucon 1/23-2/4/2019, 5/18-8/27/2019.     Review of Systems:  CONSTITUTIONAL:  + fevers & chills, with sweats  EYES: negative for icterus or acute vision changes  ENT:  negative for acute hearing loss, tinnitus, sore throat  RESPIRATORY:  + dry cough, no sputum, + dyspnea  CARDIOVASCULAR:  negative for chest pain, palpitations  GASTROINTESTINAL:  negative for nausea, vomiting, diarrhea or constipation  GENITOURINARY:  negative for dysuria or hematuria  HEME:  No easy bruising or bleeding  INTEGUMENT:  negative for rash, but at night may have some pruritus  NEURO:  Negative for  headache or tremor. He occ has dizziness when he is short of breath, such as climbing 2 flights of stairs.     Past Medical History:   Diagnosis Date     GERD (gastroesophageal reflux disease)      H/O autologous stem cell transplant (H) 02/2005     Hyperlipidemia      Multiple myeloma (H) 2004     PONV (postoperative nausea and vomiting)      Psoriasis      Psoriatic arthritis (H)        Past Surgical History:   Procedure Laterality Date     ARTHROPLASTY HIP       COLONOSCOPY       HERNIA REPAIR       IR CVC TUNNEL PLACEMENT > 5 YRS OF AGE  1/22/2019     IR CVC TUNNEL PLACEMENT > 5 YRS OF AGE  5/16/2019     IR CVC TUNNEL REMOVAL LEFT  2/20/2019     IR CVC TUNNEL REMOVAL RIGHT  7/23/2019     IR FOLLOW UP VISIT OUTPATIENT  1/24/2019     ORTHOPEDIC SURGERY       PROCURE BONE MARROW N/A 5/14/2019    Procedure: Bone Marrow [place];  Surgeon: Antwan Erickson MD;  Location: [facility] OR     TRANSPLANT         Family History   Problem Relation Age of Onset     Diabetes Mother      Cerebrovascular Disease Mother      Leukemia Father        Social History     Patient does not qualify to have social determinant information on file (likely too young).   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 He has not worked in the field since ~ 2005. He lives in [place] with his wife & dog. His wife does the yardwork. There are outdoor birds on the property. 2 of their children live in [place]. No prior TB exposures.      Social History     Tobacco Use     Smoking status: Former Smoker     Smokeless tobacco: Never Used   Substance Use Topics     Alcohol use: Yes     Comment: occasionaly     Drug use: No       Immunization History   Administered Date(s) Administered     Influenza (IIV3) PF 12/20/2013     Influenza Vaccine, 3 YRS +, IM (QUADRIVALENT W/PRESERVATIVES) 10/08/2015, 10/26/2016, 11/04/2017     TD (ADULT, 7+) 12/11/1996     TDAP Vaccine (Boostrix) 10/08/2015, 07/15/2019      Tetanus 12/11/1996       Patient Active Problem List   Diagnosis     Psoriasis with arthropathy (H)     Hypogammaglobulinemia (H)     Multiple myeloma not having achieved remission (H)     Lymphadenopathy     EBV (Muna-Barr virus) viremia     Fever in adult            Current Medications & Allergies:       acyclovir  800 mg Oral BID     albuterol  2.5 mg Nebulization Q28 Days     calcium carbonate  600 mg Oral BID     ceFEPIme (MAXIPIME) IV  2 g Intravenous Q8H     PARoxetine  20 mg Oral QAM     pentamidine  300 mg Inhalation Q28 Days     sodium chloride (PF)  3 mL Intracatheter Q8H     vitamin D3  2,000 Units Oral Daily       Allergies   Allergen Reactions     Chlorhexidine Itching     Avalide Hives     Chloroxylenol Rash     Technicare solution     Lorazepam Hives     Moxifloxacin Hives            Physical Exam:   Vitals were reviewed.  All vitals stable.  Patient Vitals for the past 24 hrs:   BP Temp Temp src Pulse Resp SpO2 Weight   10/17/19 1138 118/73 101.7  F (38.7  C) Oral -- 20 93 % --   10/17/19 0931 -- 101.5  F (38.6  C) -- -- -- -- --   10/17/19 0741 121/81 100.1  F (37.8  C) Oral -- 20 95 % 77.7 kg (171 lb 6.4 oz)   10/17/19 0600 -- 100  F (37.8  C) Oral -- -- -- --   10/17/19 0418 (!) 160/85 102.5  F (39.2  C) Oral 118 20 95 % --   10/17/19 0130 -- 100.1  F (37.8  C) Oral 104 -- -- --   10/16/19 2346 (!) 158/82 102.3  F (39.1  C) Oral 112 20 96 % --   10/16/19 2135 -- 99.6  F (37.6  C) Oral -- -- -- --   10/16/19 2045 -- 100.5  F (38.1  C) Axillary -- -- -- --   10/16/19 2026 -- 103.3  F (39.6  C) Axillary -- -- -- --   10/16/19 1944 139/83 103.9  F (39.9  C) Axillary -- 20 93 % --   10/16/19 1851 (!) 164/79 103.1  F (39.5  C) Oral -- 18 94 % --   10/16/19 1830 -- -- -- -- -- 94 % --   10/16/19 1820 (!) 152/78 102.3  F (39.1  C) Axillary -- 16 95 % --     Ranges for vital signs:  Temp:  [98.8  F (37.1  C)-103.9  F (39.9  C)] 101.7  F (38.7  C)  Pulse:  [100-118] 118  Heart Rate:  [104-120]  115  Resp:  [14-20] 20  BP: (100-164)/(68-85) 118/73  SpO2:  [3 %-98 %] 93 %  Vitals:    10/17/19 0741   Weight: 77.7 kg (171 lb 6.4 oz)       Physical Examination:  GENERAL:  well-developed, well-nourished man, in bed in no acute distress.  HEAD:  Head is normocephalic, atraumatic   EYES:  Eyes have anicteric sclerae   ENT:  Oropharynx is moist without exudates or ulcers. Tongue is midline  NECK:  Supple. No cervical lymphadenopathy  LUNGS:  Crackles in lower lobes  CARDIOVASCULAR:  Regular rate and rhythm with no murmur  ABDOMEN:  Normal bowel sounds, soft, nontender.   SKIN:  No acute rashes. PIV in place without any surrounding erythema or exudate.  NEUROLOGIC:  Grossly nonfocal. Active x4 extremities         Laboratory Data:     Inflammatory Markers    Recent Labs   Lab Test 09/25/19  1022   .0*       Immune Globulin Studies     Recent Labs   Lab Test 10/16/19  0930 09/25/19  1018 08/23/19  1301 06/11/19  0918 05/06/19  0936 01/29/19  0810 01/14/19  0945    507* 824 572* 636* 826 864   IGM  --   --  25* 20* 26* 43* 50*   IGE  --   --   --   --  3  --  6   IGA  --   --  15* 41* 45* 311 327       Metabolic Studies       Recent Labs   Lab Test 10/17/19  0413 10/16/19  0930  10/15/19  1635  09/28/19  1457  09/28/19  0407  09/27/19  0532 09/27/19  0444  09/24/19  1618    134   < >  --    < >  --   --  138  --   --  134   < >  --    POTASSIUM 4.0 3.7   < >  --    < >  --    < > 3.3*   < >  --  3.2*   < >  --    CHLORIDE 105 102   < >  --    < >  --   --  104  --   --  104   < >  --    CO2 22 21   < >  --    < >  --   --  21  --   --  22   < >  --    ANIONGAP 10 11   < >  --    < >  --   --  13  --   --  8   < >  --    BUN 12 13   < >  --    < >  --   --  17  --   --  11   < >  --    CR 1.08 1.01   < >  --    < >  --   --  0.78  --   --  0.80   < >  --    GFRESTIMATED 73 80   < >  --    < >  --   --  >90  --   --  >90   < >  --    * 147*   < >  --    < >  --   --  95  --   --  106*   < >  --     ZHEN 8.6 8.5   < >  --    < >  --   --  8.8  --   --  8.5   < >  --    PHOS  --   --   --   --   --   --   --  2.6  --   --  2.2*  --   --    MAG  --   --   --   --   --   --   --   --   --   --  1.7  --   --    LACT  --   --   --   --   --  1.3  --   --   --  1.7  --    < >  --    PCAL  --   --   --  0.29  --   --   --   --   --   --   --   --   --    FGTL  --   --   --   --   --   --   --   --   --   --   --   --  <31    < > = values in this interval not displayed.       Hepatic Studies    Recent Labs   Lab Test 10/16/19  0930 10/15/19  1644 10/08/19  1301 10/04/19  0912  06/03/19  0305   BILITOTAL 0.8  --  0.6 0.8   < > 0.2   DBIL  --   --   --   --   --  <0.1   ALKPHOS 114  --  206* 288*   < > 55   PROTTOTAL 6.8  --  7.1 7.1   < > 5.7*   ALBUMIN 3.2*  --  3.5 3.3*   < > 2.6*   AST 13  --  21 20   < > 18   ALT 22  --  43 49   < > 23    206  --   --    < >  --     < > = values in this interval not displayed.       Gout Labs      Recent Labs   Lab Test 08/23/19  1301 05/16/19  1140 05/06/19  0936 01/23/19  0259 01/14/19  0945   URIC 4.2 4.6 4.6 4.4 4.2       Hematology Studies      Recent Labs   Lab Test 10/17/19  0413 10/16/19  0930 10/15/19  1644 10/11/19  0922 10/08/19  1301 10/04/19  0912   WBC 4.6 3.0* 2.5* 4.5 2.1* 4.0   ANEU 2.7 2.4 1.7 2.6 0.7* 2.3   ALYM 1.6 0.3* 0.5* 1.3 1.0 1.1   NEGAR 0.1 0.2 0.2 0.4 0.3 0.3   AEOS 0.2 0.1 0.1 0.2 0.1 0.2   HGB 9.7* 7.6* 8.4* 9.0* 9.1* 9.5*   HCT 29.5* 22.7* 25.0* 27.4* 27.5* 30.1*   PLT 14* 9* 12* 17* 16* 19*       Clotting Studies    Recent Labs   Lab Test 10/16/19  1938 09/24/19  0149  07/22/19  0009 06/03/19  0305   INR 1.27* 1.19*  --  1.16* 1.10   PTT 43* 42*   < > 74*  --     < > = values in this interval not displayed.       Iron Testing    Recent Labs   Lab Test 10/17/19  0413   *       Urine Studies     Recent Labs   Lab Test 09/25/19  0822 09/23/19  1757 07/21/19 2013 05/30/19  2228 05/26/19  2104 05/06/19  0936   URINEPH 7.5* 6.0 6.0 6.0 6.0  6.0   NITRITE Negative Negative Negative Negative Negative Negative   LEUKEST Negative Negative Negative Negative Negative Negative   WBCU <1 1 1 1  --  1       Microbiology:  Fungal testing  Recent Labs   Lab Test 09/25/19 2011 09/24/19  1618 06/02/19  1045   FGTL  --  <31 <31   ASPGAI  --  0.05 0.04   ASPGAA  --  Negative Negative   ASPERGILLUSA <1:8  --   --    HISFUN <1:8  --   --    COFUNG <1:2  --   --        Last Culture results with specimen source  Culture Micro   Date Value Ref Range Status   10/17/2019 PENDING  Preliminary   10/17/2019 PENDING  Preliminary   10/17/2019 PENDING  Preliminary   10/16/2019 No growth after 18 hours  Preliminary   10/15/2019 No growth after 2 days  Preliminary   09/28/2019 No growth  Final   09/28/2019 No growth  Final   09/27/2019 No growth  Final   09/26/2019 No growth  Final   09/26/2019 No growth   Final   09/25/2019 No growth  Final   09/25/2019 No growth  Final   09/25/2019 No growth   Final   09/25/2019 No growth  Final   09/24/2019 No growth   Final   09/24/2019 No growth  Final   09/24/2019 Canceled, Test credited  Duplicate request    Final   09/24/2019 No VRE isolated  Final   09/23/2019 No growth  Final    Specimen Description   Date Value Ref Range Status   10/17/2019 Feces  Final   10/17/2019 Blood Left Arm  Final   10/17/2019 Blood Left Arm  Final   10/16/2019 Blood Right Arm  Final   10/15/2019 Blood Unspecified Site  Final   09/28/2019 Blood Left Arm  Final   09/28/2019 Blood Left Arm  Final   09/27/2019 Blood Left Arm  Final   09/26/2019 Blood Left Arm  Final   09/26/2019 Blood Unspecified Site  Final   09/26/2019 Blood  Final   09/25/2019 Blood  Final   09/25/2019 Other Transfusion Reaction Donor Unit  Final   09/25/2019 Other Transfusion Reaction Donor Unit  Final   09/25/2019 Other Transfusion Reaction Donor Unit  Final   09/25/2019 Other Transfusion Reaction Donor Unit  Final   09/25/2019 Blood Right Arm  Final   09/25/2019 Blood Right Arm  Final    09/24/2019 Blood  Final   09/24/2019 Blood Right Arm  Final        Last check of C difficile  C Diff Toxin B PCR   Date Value Ref Range Status   05/24/2019 Negative NEG^Negative Final     Comment:     Negative: Clostridium difficile target DNA sequences NOT detected, presumed   negative for Clostridium difficile toxin B or the number of bacteria present   may be below the limit of detection for the test.  FDA approved assay performed using [employer] GeneXpert real-time PCR.  A negative result does not exclude actual disease due to Clostridium difficile   and may be due to improper collection, handling and storage of the specimen   or the number of organisms in the specimen is below the detection limit of the   assay.         Syphilis Testing    Rapid Plasma Reagin   Date Value Ref Range Status   01/05/2005 Negative NEG Final       Quantiferon testing   Recent Labs   Lab Test 09/25/19 2011   TBRES Negative       Virology:  Log IU/mL of CMVQNT   Date Value Ref Range Status   10/16/2019 Not Calculated <2.1 [Log_IU]/mL Final   10/08/2019 Not Calculated <2.1 [Log_IU]/mL Final   09/25/2019 Not Calculated <2.1 [Log_IU]/mL Final   08/23/2019 Not Calculated <2.1 [Log_IU]/mL Final   08/06/2019 Not Calculated <2.1 [Log_IU]/mL Final   07/15/2019 Not Calculated <2.1 [Log_IU]/mL Final   07/08/2019 Not Calculated <2.1 [Log_IU]/mL Final   07/01/2019 Not Calculated <2.1 [Log_IU]/mL Final   06/24/2019 Not Calculated <2.1 [Log_IU]/mL Final   06/17/2019 Not Calculated <2.1 [Log_IU]/mL Final   06/07/2019 Not Calculated <2.1 [Log_IU]/mL Final   05/29/2019 Not Calculated <2.1 [Log_IU]/mL Final   05/17/2019 Not Calculated <2.1 [Log_IU]/mL Final       EBV DNA Copies/mL   Date Value Ref Range Status   10/08/2019 1,203 (A) EBVNEG^EBV DNA Not Detected [Copies]/mL Final   09/25/2019 2,215 (A) EBVNEG^EBV DNA Not Detected [Copies]/mL Final       Hepatitis C Antibody   Date Value Ref Range Status   02/24/2006 Negative NEG Final   02/04/2005  Negative NEG Final       CMV IgG Antibody   Date Value Ref Range Status   01/05/2005 >160.0 EU/mL Final     Comment:     Positive for anti-CMV IgG     Herpes Simplex IgG Antibody Immune Status Ratio   Date Value Ref Range Status   02/04/2005 1.21  Final     Herpes Simplex IgG Antibody Interpretation   Date Value Ref Range Status   02/04/2005 Positive, suggests immunologic exposure.  Final     EBV Capsid Antibody IgG   Date Value Ref Range Status   05/06/2019 >8.0 (H) 0.0 - 0.8 AI Final     Comment:     Positive, suggests recent or past exposure  Antibody index (AI) values reflect qualitative changes in antibody   concentration that cannot be directly associated with clinical condition or   disease state.     01/14/2019 >8.0 (H) 0.0 - 0.8 AI Final     Comment:     Positive, suggests recent or past exposure  Antibody index (AI) values reflect qualitative changes in antibody   concentration that cannot be directly associated with clinical condition or   disease state.       Herpes Simplex Virus Type 1 IgG   Date Value Ref Range Status   05/06/2019 <0.2 0.0 - 0.8 AI Final     Comment:     No HSV-1 IgG antibodies detected.  Antibody index (AI) values reflect qualitative changes in antibody   concentration that cannot be directly associated with clinical condition or   disease state.     01/14/2019 <0.2 0.0 - 0.8 AI Final     Comment:     No HSV-1 IgG antibodies detected.  Antibody index (AI) values reflect qualitative changes in antibody   concentration that cannot be directly associated with clinical condition or   disease state.       Herpes Simplex Virus Type 2 IgG   Date Value Ref Range Status   05/06/2019 0.7 0.0 - 0.8 AI Final     Comment:     No HSV-2 IgG antibodies detected.  Antibody index (AI) values reflect qualitative changes in antibody   concentration that cannot be directly associated with clinical condition or   disease state.     01/14/2019 5.7 (H) 0.0 - 0.8 AI Final     Comment:     Positive.  IgG  antibody to HSV-2 detected.  Antibody index (AI) values reflect qualitative changes in antibody   concentration that cannot be directly associated with clinical condition or   disease state.         Imaging:  Recent Results (from the past 48 hour(s))   CT Chest w/o Contrast    Narrative    EXAMINATION: CT CHEST W/O CONTRAST, 10/16/2019 7:11 PM    CLINICAL HISTORY: fevers x3days. Previously seen mediastinal and  axillary/cervical adenopathy, s/p BMT for MM    COMPARISON: CT chest dated 9/24/2019.    TECHNIQUE: CT imaging obtained through the chest without contrast.  Coronal and axial MIP reformatted images obtained.     FINDINGS:    Lungs: Lung images mildly degraded by motion artifact. No new focal  consolidation. Fibrotic atelectatic changes in the posterior lower  lobes similar to prior. Multiple small calcified granulomas. No  suspicious pulmonary nodules.    Pleura: No pleural effusion or pneumothorax.    Heart: Heart size is normal. No pericardial effusion. Mild coronary  artery disease and mitral valve calcification.    Thoracic vasculature:  Limited visualization on this on-contrast exam.  The thoracic aorta is normal in caliber. Mildly enlarged main  pulmonary artery measuring approximately 3.2 cm in diameter. Vascular  calcifications of the aortic arch. Normal branching pattern of the  great vessels.    Thyroid: No suspicious nodules.    Mediastinum:  Central tracheobronchial tree is patent. Multiple  enlarged mediastinal and axillary lymph nodes similar to prior exams.  For example there is a 1.2 cm paratracheal node, series 2 image 21  there is a 1.5 cm subcarinal node, series 2 image 27. There is a 1.1  cm right axillary node, and a 1.2 cm left axillary node, series 2  image 12. Multiple calcified left hilar nodes.    Upper abdomen: Limited by field of view and lack of contrast. No  suspicious liver or splenic lesions within the field-of-view. Splenic  granulomas noted. Gallbladder is mildly distended.  No stones within  the field-of-view.    Bones and soft tissues: Multilevel compression deformities of the  thoracic vertebrae similar to prior exam. Multiple chronic appearing  right-sided rib fractures. No suspicious bone lesions.      Impression    IMPRESSION:   1. Mediastinal and axillary lymphadenopathy similar to prior exam,  which remains concerning for lymphoproliferative disease and/or PTLD,  given patient's history of bone marrow transplant.  2. No new focal airspace consolidation.  3. Sequela prior granulomatous disease.    I have personally reviewed the examination and initial interpretation  and I agree with the findings.    DEMARCO MARTINEZ MD

## 2019-10-17 NOTE — PLAN OF CARE
Interventional Pulmonology update:  Consult for EBUS received. Spoke with primary team who is concerned about lymphoproliferative process in setting of high EBV titers. The ideal biopsy would be true core needle biopsies. With that said, EBUS for de dayo lymphoma or PTLD carries a low diagnostic yield. Before moving forward with EBUS, we recommend obtaining a CT abdomen/pelvis (ideally with IV contrast if renal function allows) to determine if there are any viable targets for true core needle biopsy below the diaphragm; we may also end up speaking with GI regarding biopsy depending on what we find. I have placed an NPO order as a precaution in case we or another service do end up taking him for biopsy. The primary team has left for the day, but the request for CT imaging was relayed to the covering PM hospitalist.       Tom Patino MD, 10/17/2019, 4:33 PM  Interventional Pulmonology Fellow  Department of Pulmonary, Allergy, Critical Care & Sleep Medicine   Pager: [phone]

## 2019-10-17 NOTE — PLAN OF CARE
D:/81 (BP Location: Left arm)   Pulse 118   Temp 100.1  F (37.8  C) (Oral)   Resp 20   Wt 77.7 kg (171 lb 6.4 oz)   SpO2 95%   BMI 24.04 kg/m   RA.Pt continues to be febrile with high of 102.5  po and blood cx done and urine sent. Lungs clear.Has a cough with elevated temp.    I: Monitored vitals and assessed pt status  Change: No change fevers.  Running:Cefepime IV every 8 hrs.  PRN:Tylenol x2 with relief.    A: A/O x 4. Heart rate tachy and febrile.    P: Continue to monitor pt status and report changes to treatment team. On cefepime IV and will continue to give tylenol and ice bags for temps.Need stool for VRE.Will continue POC.and report temps and changes.

## 2019-10-17 NOTE — PLAN OF CARE
Tmax: 102.9, tachy: 100-120, RR: 18-24 (elevated with fevers), OVSS. Sepsis protocol triggered this evening, awaiting lab to draw lactic. Tylenol x3. 1u platelets transfused. Echo and pulmonology testing completed today. Possible lymphnode biopsy tomorrow, plan for NPO @ [facility]. Will continue to monitor.     Problem: Infection  Goal: Infection Symptom Resolution  10/17/2019 1848 by Leslie Cantu RN  Outcome: No Change     Problem: Adult Inpatient Plan of Care  Goal: Plan of Care Review  Outcome: No Change     Problem: Adult Inpatient Plan of Care  Goal: Patient-Specific Goal (Individualization)  Outcome: No Change

## 2019-10-17 NOTE — PLAN OF CARE
/83 (BP Location: Left arm)   Temp 100.5  F (38.1  C) (Axillary)   Resp 20   SpO2 93%     Pt has had a fever since admission with a T-max of 103.9 and HR is tachy. Tylenol given and ice bags applied. Temp has come down to 100.5. He is AOx4 and independent. No complaints of pain or N/V/D.   A chest CT was done and antibiotics were started. Will continue to follow the POC.      Problem: Infection  Goal: Infection Symptom Resolution  Outcome: No Change     Problem: Adult Inpatient Plan of Care  Goal: Plan of Care Review  Outcome: No Change     Problem: Adult Inpatient Plan of Care  Goal: Optimal Comfort and Wellbeing  Outcome: No Change     Problem: Fever (Fever with Neutropenia)  Goal: Baseline Body Temperature  Outcome: No Change

## 2019-10-18 ENCOUNTER — APPOINTMENT (OUTPATIENT)
Dept: INTERVENTIONAL RADIOLOGY/VASCULAR | Facility: CLINIC | Age: 59
DRG: 868 | End: 2019-10-18
Attending: NURSE PRACTITIONER
Payer: COMMERCIAL

## 2019-10-18 ENCOUNTER — APPOINTMENT (OUTPATIENT)
Dept: CT IMAGING | Facility: CLINIC | Age: 59
DRG: 868 | End: 2019-10-18
Attending: INTERNAL MEDICINE
Payer: COMMERCIAL

## 2019-10-18 LAB
ANION GAP SERPL CALCULATED.3IONS-SCNC: 7 MMOL/L (ref 3–14)
ANISOCYTOSIS BLD QL SMEAR: ABNORMAL
BASOPHILS # BLD AUTO: 0 10E9/L (ref 0–0.2)
BASOPHILS NFR BLD AUTO: 0 %
BLD PROD TYP BPU: NORMAL
BLD UNIT ID BPU: 0
BLOOD PRODUCT CODE: NORMAL
BPU ID: NORMAL
BUN SERPL-MCNC: 14 MG/DL (ref 7–30)
CALCIUM SERPL-MCNC: 8.9 MG/DL (ref 8.5–10.1)
CHLORIDE SERPL-SCNC: 100 MMOL/L (ref 94–109)
CO2 SERPL-SCNC: 24 MMOL/L (ref 20–32)
CREAT SERPL-MCNC: 0.98 MG/DL (ref 0.66–1.25)
DIFFERENTIAL METHOD BLD: ABNORMAL
EOSINOPHIL # BLD AUTO: 0.1 10E9/L (ref 0–0.7)
EOSINOPHIL NFR BLD AUTO: 1.8 %
ERYTHROCYTE [DISTWIDTH] IN BLOOD BY AUTOMATED COUNT: 19.6 % (ref 10–15)
GFR SERPL CREATININE-BSD FRML MDRD: 83 ML/MIN/{1.73_M2}
GLUCOSE SERPL-MCNC: 101 MG/DL (ref 70–99)
HCT VFR BLD AUTO: 27.7 % (ref 40–53)
HGB BLD-MCNC: 9.2 G/DL (ref 13.3–17.7)
LYMPHOCYTES # BLD AUTO: 0.3 10E9/L (ref 0.8–5.3)
LYMPHOCYTES NFR BLD AUTO: 7.1 %
MACROCYTES BLD QL SMEAR: PRESENT
MCH RBC QN AUTO: 35.4 PG (ref 26.5–33)
MCHC RBC AUTO-ENTMCNC: 33.2 G/DL (ref 31.5–36.5)
MCV RBC AUTO: 107 FL (ref 78–100)
MONOCYTES # BLD AUTO: 0 10E9/L (ref 0–1.3)
MONOCYTES NFR BLD AUTO: 0 %
NEUTROPHILS # BLD AUTO: 3.3 10E9/L (ref 1.6–8.3)
NEUTROPHILS NFR BLD AUTO: 91.1 %
NUM BPU REQUESTED: 1
NUM BPU REQUESTED: 1
PLATELET # BLD AUTO: 24 10E9/L (ref 150–450)
PLATELET # BLD AUTO: 29 10E9/L (ref 150–450)
PLATELET # BLD EST: ABNORMAL 10*3/UL
POTASSIUM SERPL-SCNC: 3.4 MMOL/L (ref 3.4–5.3)
RBC # BLD AUTO: 2.6 10E12/L (ref 4.4–5.9)
SODIUM SERPL-SCNC: 132 MMOL/L (ref 133–144)
TRANSFUSION STATUS PATIENT QL: NORMAL
WBC # BLD AUTO: 3.6 10E9/L (ref 4–11)

## 2019-10-18 PROCEDURE — 88341 IMHCHEM/IMCYTCHM EA ADD ANTB: CPT | Performed by: INTERNAL MEDICINE

## 2019-10-18 PROCEDURE — 84999 UNLISTED CHEMISTRY PROCEDURE: CPT | Performed by: PHYSICIAN ASSISTANT

## 2019-10-18 PROCEDURE — 87633 RESP VIRUS 12-25 TARGETS: CPT | Performed by: PHYSICIAN ASSISTANT

## 2019-10-18 PROCEDURE — 20600000 ZZH R&B BMT

## 2019-10-18 PROCEDURE — 25000125 ZZHC RX 250: Performed by: PHYSICIAN ASSISTANT

## 2019-10-18 PROCEDURE — P9037 PLATE PHERES LEUKOREDU IRRAD: HCPCS | Performed by: PHYSICIAN ASSISTANT

## 2019-10-18 PROCEDURE — 25000132 ZZH RX MED GY IP 250 OP 250 PS 637: Performed by: PHYSICIAN ASSISTANT

## 2019-10-18 PROCEDURE — 88342 IMHCHEM/IMCYTCHM 1ST ANTB: CPT | Performed by: INTERNAL MEDICINE

## 2019-10-18 PROCEDURE — 40001004 ZZHCL STATISTIC FLOW INT 9-15 ABY TC 88188: Performed by: PHYSICIAN ASSISTANT

## 2019-10-18 PROCEDURE — 87798 DETECT AGENT NOS DNA AMP: CPT | Performed by: PHYSICIAN ASSISTANT

## 2019-10-18 PROCEDURE — 86668 FRANCISELLA TULARENSIS: CPT | Performed by: PHYSICIAN ASSISTANT

## 2019-10-18 PROCEDURE — 86747 PARVOVIRUS ANTIBODY: CPT | Performed by: PHYSICIAN ASSISTANT

## 2019-10-18 PROCEDURE — 88333 PATH CONSLTJ SURG CYTO XM 1: CPT | Performed by: INTERNAL MEDICINE

## 2019-10-18 PROCEDURE — 25000132 ZZH RX MED GY IP 250 OP 250 PS 637: Performed by: INTERNAL MEDICINE

## 2019-10-18 PROCEDURE — 25000132 ZZH RX MED GY IP 250 OP 250 PS 637: Performed by: NURSE PRACTITIONER

## 2019-10-18 PROCEDURE — 76942 ECHO GUIDE FOR BIOPSY: CPT

## 2019-10-18 PROCEDURE — 80048 BASIC METABOLIC PNL TOTAL CA: CPT | Performed by: INTERNAL MEDICINE

## 2019-10-18 PROCEDURE — 07D63ZX EXTRACTION OF LEFT AXILLARY LYMPHATIC, PERCUTANEOUS APPROACH, DIAGNOSTIC: ICD-10-PCS | Performed by: PHYSICIAN ASSISTANT

## 2019-10-18 PROCEDURE — 25800030 ZZH RX IP 258 OP 636: Performed by: PHYSICIAN ASSISTANT

## 2019-10-18 PROCEDURE — 74177 CT ABD & PELVIS W/CONTRAST: CPT

## 2019-10-18 PROCEDURE — 86720 LEPTOSPIRA ANTIBODY: CPT | Performed by: PHYSICIAN ASSISTANT

## 2019-10-18 PROCEDURE — 25000128 H RX IP 250 OP 636: Performed by: INTERNAL MEDICINE

## 2019-10-18 PROCEDURE — 36415 COLL VENOUS BLD VENIPUNCTURE: CPT | Performed by: PHYSICIAN ASSISTANT

## 2019-10-18 PROCEDURE — 86638 Q FEVER ANTIBODY: CPT | Performed by: PHYSICIAN ASSISTANT

## 2019-10-18 PROCEDURE — 88185 FLOWCYTOMETRY/TC ADD-ON: CPT | Performed by: PHYSICIAN ASSISTANT

## 2019-10-18 PROCEDURE — 88365 INSITU HYBRIDIZATION (FISH): CPT | Performed by: INTERNAL MEDICINE

## 2019-10-18 PROCEDURE — 87040 BLOOD CULTURE FOR BACTERIA: CPT | Performed by: INTERNAL MEDICINE

## 2019-10-18 PROCEDURE — 87449 NOS EACH ORGANISM AG IA: CPT | Performed by: PHYSICIAN ASSISTANT

## 2019-10-18 PROCEDURE — 25000128 H RX IP 250 OP 636: Performed by: NURSE PRACTITIONER

## 2019-10-18 PROCEDURE — 85049 AUTOMATED PLATELET COUNT: CPT | Performed by: PHYSICIAN ASSISTANT

## 2019-10-18 PROCEDURE — 87103 BLOOD FUNGUS CULTURE: CPT | Performed by: INTERNAL MEDICINE

## 2019-10-18 PROCEDURE — 85025 COMPLETE CBC W/AUTO DIFF WBC: CPT | Performed by: INTERNAL MEDICINE

## 2019-10-18 PROCEDURE — 87385 HISTOPLASMA CAPSUL AG IA: CPT | Performed by: PHYSICIAN ASSISTANT

## 2019-10-18 PROCEDURE — 88305 TISSUE EXAM BY PATHOLOGIST: CPT | Performed by: INTERNAL MEDICINE

## 2019-10-18 PROCEDURE — 86622 BRUCELLA ANTIBODY: CPT | Performed by: PHYSICIAN ASSISTANT

## 2019-10-18 PROCEDURE — 87507 IADNA-DNA/RNA PROBE TQ 12-25: CPT | Performed by: PHYSICIAN ASSISTANT

## 2019-10-18 PROCEDURE — 88184 FLOWCYTOMETRY/ TC 1 MARKER: CPT | Performed by: PHYSICIAN ASSISTANT

## 2019-10-18 PROCEDURE — 87471 BARTONELLA DNA AMP PROBE: CPT | Performed by: INTERNAL MEDICINE

## 2019-10-18 PROCEDURE — 36415 COLL VENOUS BLD VENIPUNCTURE: CPT | Performed by: INTERNAL MEDICINE

## 2019-10-18 PROCEDURE — 87471 BARTONELLA DNA AMP PROBE: CPT | Performed by: PHYSICIAN ASSISTANT

## 2019-10-18 RX ORDER — ITRACONAZOLE 100 MG/1
200 CAPSULE ORAL 3 TIMES DAILY
Status: DISCONTINUED | OUTPATIENT
Start: 2019-10-18 | End: 2019-10-23 | Stop reason: HOSPADM

## 2019-10-18 RX ORDER — LIDOCAINE HYDROCHLORIDE 10 MG/ML
1-30 INJECTION, SOLUTION EPIDURAL; INFILTRATION; INTRACAUDAL; PERINEURAL
Status: COMPLETED | OUTPATIENT
Start: 2019-10-18 | End: 2019-10-18

## 2019-10-18 RX ORDER — BENZONATATE 100 MG/1
100 CAPSULE ORAL 3 TIMES DAILY PRN
Status: DISCONTINUED | OUTPATIENT
Start: 2019-10-18 | End: 2019-10-23 | Stop reason: HOSPADM

## 2019-10-18 RX ORDER — CODEINE PHOSPHATE AND GUAIFENESIN 10; 100 MG/5ML; MG/5ML
5-10 SOLUTION ORAL EVERY 4 HOURS PRN
Status: DISCONTINUED | OUTPATIENT
Start: 2019-10-18 | End: 2019-10-23 | Stop reason: HOSPADM

## 2019-10-18 RX ADMIN — CEFEPIME HYDROCHLORIDE 2 G: 2 INJECTION, POWDER, FOR SOLUTION INTRAVENOUS at 10:02

## 2019-10-18 RX ADMIN — CELECOXIB 200 MG: 100 CAPSULE ORAL at 05:51

## 2019-10-18 RX ADMIN — ACYCLOVIR 800 MG: 800 TABLET ORAL at 18:44

## 2019-10-18 RX ADMIN — MELATONIN 2000 UNITS: at 10:03

## 2019-10-18 RX ADMIN — IOPAMIDOL 105 ML: 755 INJECTION, SOLUTION INTRAVENOUS at 09:43

## 2019-10-18 RX ADMIN — ACETAMINOPHEN 325 MG: 325 TABLET, FILM COATED ORAL at 03:32

## 2019-10-18 RX ADMIN — Medication 600 MG: at 10:02

## 2019-10-18 RX ADMIN — DEXTROSE AND SODIUM CHLORIDE 500 ML: 5; 450 INJECTION, SOLUTION INTRAVENOUS at 08:04

## 2019-10-18 RX ADMIN — ITRACONAZOLE 200 MG: 100 CAPSULE ORAL at 22:09

## 2019-10-18 RX ADMIN — LIDOCAINE HYDROCHLORIDE 10 ML: 10 INJECTION, SOLUTION EPIDURAL; INFILTRATION; INTRACAUDAL; PERINEURAL at 14:18

## 2019-10-18 RX ADMIN — BENZONATATE 100 MG: 100 CAPSULE ORAL at 18:44

## 2019-10-18 RX ADMIN — ACETAMINOPHEN 650 MG: 325 TABLET, FILM COATED ORAL at 20:28

## 2019-10-18 RX ADMIN — CEFEPIME HYDROCHLORIDE 2 G: 2 INJECTION, POWDER, FOR SOLUTION INTRAVENOUS at 16:53

## 2019-10-18 RX ADMIN — ITRACONAZOLE 200 MG: 100 CAPSULE ORAL at 15:32

## 2019-10-18 RX ADMIN — ACYCLOVIR 800 MG: 800 TABLET ORAL at 10:03

## 2019-10-18 RX ADMIN — CEFEPIME HYDROCHLORIDE 2 G: 2 INJECTION, POWDER, FOR SOLUTION INTRAVENOUS at 00:54

## 2019-10-18 RX ADMIN — PAROXETINE HYDROCHLORIDE HEMIHYDRATE 20 MG: 20 TABLET, FILM COATED ORAL at 10:03

## 2019-10-18 ASSESSMENT — ACTIVITIES OF DAILY LIVING (ADL)
ADLS_ACUITY_SCORE: 12

## 2019-10-18 NOTE — CONSULTS
GASTROENTEROLOGY CONSULTATION      Date of Admission:  10/16/2019          ASSESSMENT AND RECOMMENDATIONS:     59 year old male with a history of autologous transplant for multiple myeloma myeloma, recent admission 9/23 through 9/29/2019 for fevers of unknown origin, now readmitted with 3 days of fevers.  GI is consulted for consideration of EUS biopsy of subcarinal node to rule out PTLD.    Case was discussed with Dr. Ramirez of GI, Dr. Kelley of IR, Dr. Gray of Interventional Pulmonology, and [facility] attending Dr. Lanier. At this point, we will first proceed with IR Fine needle aspiration of axillary lymph nodes in setting of thrombocytopenia. No ideal lymph node target visualized on preliminary read of CT Abd/Pelvis. If no effective sample, will re-evaluate need for EUS biopsy of subcarinal node. Multiple discussions between     Thank you for involving us in this patient's care. Please do not hesitate to contact the GI service with any questions or concerns.     Patient care plan discussed with Dr. Ramirez, GI staff physician.    Maria Teresa Khan MD  Gastroenterology   PGY4   Pager [phone]            Chief Complaint:   We were asked by Jadyn Marrufo of BMT to evaluate this patient with concern for PTLD     History is obtained from the patient and the medical record.          History of Present Illness:   Angel Yanez is a 59 year old male with a history of bone marrow transplant x2 for multiple myeloma, who is      Note recent hospital admission on 9/23 - 9/29 for fevers of unknown etiology, pancytopenia secondary to chemotherapy and recent stem cell transplant.  During this time, patient had extensive work-up performed and was empirically started on cefepime and azithromycin for possible community-acquired pneumonia and coverage for possible bacteremia.  Numerous infectious disease studies were sent and negative.  Patient was discharged given clinical improvement in fatigue and  appetite.  At end of hospitalization there was concern for EBV reactivation posttransplant lymphoproliferative disease as being possible source of fevers, although EBV was only mildly elevated at 2200.  No lymph node biopsy was pursued at that time.    Since discharge patient had initially been feeling well, until about 3 days prior to admission when he developed return of fevers, fatigue, and intermittent cough.  He feels that the cough correlates with the fever.  Complete GI review review of systems is otherwise negative, including nausea, vomiting, abdominal pain, melena, hematochezia.  He denies feeling any new lumps or bumps in his neck, axillary, or inguinal region.    CT chest without contrast showed mediastinal and axillary lymphadenopathy similar to prior exam as well as sequela of prior granulomatous disease.  CT abdomen pelvis showed no lymphadenopathy in the abdomen or pelvis, although there are borderline enlarged inguinal lymph nodes, left greater than right, which are increased since 8/23/2019.    Lymph Node biopsy was discussed with interventional radiology and interventional pulmonology, with question of potential EUS biopsy of subcarinal lymph node.        Past Medical History:   Reviewed and edited as appropriate  Past Medical History:   Diagnosis Date     GERD (gastroesophageal reflux disease)      H/O autologous stem cell transplant (H) 02/2005     Hyperlipidemia      Multiple myeloma (H) 2004     PONV (postoperative nausea and vomiting)      Psoriasis      Psoriatic arthritis (H)             Past Surgical History:   Reviewed and edited as appropriate   Past Surgical History:   Procedure Laterality Date     ARTHROPLASTY HIP       COLONOSCOPY       HERNIA REPAIR       IR CVC TUNNEL PLACEMENT > 5 YRS OF AGE  1/22/2019     IR CVC TUNNEL PLACEMENT > 5 YRS OF AGE  5/16/2019     IR CVC TUNNEL REMOVAL LEFT  2/20/2019     IR CVC TUNNEL REMOVAL RIGHT  7/23/2019     IR FOLLOW UP VISIT OUTPATIENT   1/24/2019     ORTHOPEDIC SURGERY       PROCURE BONE MARROW N/A 5/14/2019    Procedure: Bone Marrow Upperco;  Surgeon: Antwan Erickson MD;  Location: [facility] OR     TRANSPLANT              Previous Endoscopy:   No results found for this or any previous visit.         Social History:   Reviewed and edited as appropriate  Social History     Socioeconomic History     Marital status:      Spouse name: Not on file     Number of children: Not on file     Years of education: Not on file     Highest education level: Not on file   Occupational History     Not on file   Social Needs     Financial resource strain: Not on file     Food insecurity:     Worry: Not on file     Inability: Not on file     Transportation needs:     Medical: Not on file     Non-medical: Not on file   Tobacco Use     Smoking status: Former Smoker     Smokeless tobacco: Never Used   Substance and Sexual Activity     Alcohol use: Yes     Comment: occasionaly     Drug use: No     Sexual activity: Not on file   Lifestyle     Physical activity:     Days per week: Not on file     Minutes per session: Not on file     Stress: Not on file   Relationships     Social connections:     Talks on phone: Not on file     Gets together: Not on file     Attends Restorationism service: Not on file     Active member of club or organization: Not on file     Attends meetings of clubs or organizations: Not on file     Relationship status: Not on file     Intimate partner violence:     Fear of current or ex partner: Not on file     Emotionally abused: Not on file     Physically abused: Not on file     Forced sexual activity: Not on file   Other Topics Concern     Parent/sibling w/ CABG, MI or angioplasty before 65F 55M? 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 He has not worked in the field since ~ 2005. He lives in [place] with his wife & dog. His wife does the yardwork. There are outdoor birds on the  property. 2 of their children live in [place]. No prior TB exposures.             Family History:   Reviewed and edited as appropriate  No known history of gastrointestinal/liver disease or  gastrointestinal malignancies       Allergies:   Reviewed and edited as appropriate     Allergies   Allergen Reactions     Chlorhexidine Itching     Avalide Hives     Chloroxylenol Rash     Technicare solution     Lorazepam Hives     Moxifloxacin Hives            Medications:     Current Facility-Administered Medications   Medication     acetaminophen (TYLENOL) tablet 325-650 mg     acetaminophen (TYLENOL) tablet 325-650 mg     acetaminophen (TYLENOL) tablet 650 mg     acyclovir (ZOVIRAX) tablet 800 mg     albuterol (PROVENTIL) neb solution 2.5 mg     ceFEPIme (MAXIPIME) 2 g vial to attach to  ml bag for ADULTS or 50 ml bag for PEDS     ceFEPIme (MAXIPIME) 2 g vial to attach to  ml bag for ADULTS or 50 ml bag for PEDS     celecoxib (celeBREX) capsule 200 mg     diphenhydrAMINE (BENADRYL) capsule 25-50 mg     itraconazole (SPORANOX) capsule 200 mg     lidocaine (LMX4) cream     lidocaine 1 % 0.1-1 mL     magnesium sulfate 4 g in 100 mL sterile water (premade)     PARoxetine (PAXIL) tablet 20 mg     pentamidine (NEBUPENT) neb solution 300 mg     potassium chloride (KLOR-CON) Packet 20-40 mEq     potassium chloride 10 mEq in 100 mL intermittent infusion with 10 mg lidocaine     potassium chloride 10 mEq in 100 mL sterile water intermittent infusion (premix)     potassium chloride 20 mEq in 50 mL intermittent infusion     potassium chloride ER (K-DUR/KLOR-CON M) CR tablet 20-40 mEq     potassium phosphate 15 mmol in D5W 250 mL intermittent infusion     potassium phosphate 20 mmol in D5W 250 mL intermittent infusion     potassium phosphate 20 mmol in D5W 500 mL intermittent infusion     potassium phosphate 25 mmol in D5W 500 mL intermittent infusion     prochlorperazine (COMPAZINE) injection 5-10 mg    Or      prochlorperazine (COMPAZINE) tablet 5-10 mg     sodium chloride (PF) 0.9% PF flush 3 mL     sodium chloride (PF) 0.9% PF flush 3 mL     vitamin D3 (CHOLECALCIFEROL) 1000 units (25 mcg) tablet 2,000 Units             Review of Systems:     A complete review of systems was performed and is negative except as noted in the HPI           Physical Exam:   /82   Pulse 118   Temp 97.9  F (36.6  C) (Axillary)   Resp 20   Wt 78.5 kg (173 lb)   SpO2 95%   BMI 24.27 kg/m    Wt:   Wt Readings from Last 2 Encounters:   10/18/19 78.5 kg (173 lb)   10/16/19 78.7 kg (173 lb 6.4 oz)      Constitutional: cooperative, pleasant, not dyspneic/diaphoretic, no acute distress  Eyes: Sclera anicteric/injected  Ears/nose/mouth/throat: Normal oropharynx without ulcers or exudate, mucus membranes moist, hearing intact  Neck: supple, thyroid normal size  CV: No edema  Respiratory: Unlabored breathing  Lymph: No axillary, submandibular, supraclavicular LAD, R>L inguinal lymphadenopathy  Abdomen: Nondistended, +bs, no hepatosplenomegaly, nontender, no peritoneal signs  Skin: warm, perfused, no jaundice  Neuro: AAO x 3, No asterixis  Psych: Normal affect  MSK: Normal gait         Data:   Labs and imaging below were independently reviewed and interpreted    BMP  Recent Labs   Lab 10/18/19  0342 10/17/19  0413 10/16/19  0930 10/15/19  1644   * 137 134 136   POTASSIUM 3.4 4.0 3.7 3.9   CHLORIDE 100 105 102 103   ZHEN 8.9 8.6 8.5 8.7   CO2 24 22 21 24   BUN 14 12 13 14   CR 0.98 1.08 1.01 0.96   * 107* 147* 110*     CBC  Recent Labs   Lab 10/18/19  0342 10/17/19  0413 10/16/19  0930 10/15/19  1644   WBC 3.6* 4.6 3.0* 2.5*   RBC 2.60* 2.78* 2.06* 2.29*   HGB 9.2* 9.7* 7.6* 8.4*   HCT 27.7* 29.5* 22.7* 25.0*   * 106* 110* 109*   MCH 35.4* 34.9* 36.9* 36.7*   MCHC 33.2 32.9 33.5 33.6   RDW 19.6* 20.1* 16.4* 16.0*   PLT 24* 14* 9* 12*     INR  Recent Labs   Lab 10/16/19  1938   INR 1.27*     LFTs  Recent Labs   Lab  10/16/19  0930   ALKPHOS 114   AST 13   ALT 22   BILITOTAL 0.8   PROTTOTAL 6.8   ALBUMIN 3.2*      CT Abd/Pelvis 10/18/19  IMPRESSION:   In this patient with a history of multiple myeloma status post bone  marrow transplant now with intermittent fevers:  1. No lymphadenopathy in the abdomen or pelvis, although there are  borderline enlarged inguinal lymph nodes, left greater than right,  which are increased since 8/23/2019. New mild hepatosplenomegaly  compared to 8/23/2019.  2. Prominent pericolonic vasculature, increased since 8/23/2019.  Question mild nonspecific colitis. Trace pelvic free fluid.  3. New small left and trace right pleural effusions with increased  interlobular septal thickening suspicious for pulmonary edema.  Partially imaged dependent opacities in the lung bases demonstrate  heterogeneous enhancement, likely representing infection or  aspiration.  4. Osteopenic appearance of the bones with numerous unchanged  thoracolumbar compression deformities, as described above.    10/17/19 NM Lung scan negative for PE    10/16/19 CT Chest w/o contrast  IMPRESSION:   1. Mediastinal and axillary lymphadenopathy similar to prior exam,  which remains concerning for lymphoproliferative disease and/or PTLD,  given patient's history of bone marrow transplant.  2. No new focal airspace consolidation.  3. Sequela prior granulomatous disease.

## 2019-10-18 NOTE — PROGRESS NOTES
BMT Daily Progress Note     Mr. Guille Yanez is 59 y.o male with hx of Multiple Myeloma, s/p 2nd Autologous pbsct 5/2019. Readmitted for 2nd time in 1 month with fevers of unknown source.   Overnight events: Febrile but stable. He had fever overnight- diaphoretic this morning, still cough with onset of fevers but no new complaints. He did decline abd/pelv CT last evening but after explaination of rational he is in agreement with plan.     Review of Systems: 10 point ROS negative except as noted above.    Physical Exam:   Blood pressure 121/82, pulse 118, temperature 97.9  F (36.6  C), temperature source Axillary, resp. rate 20, weight 78.5 kg (173 lb), SpO2 95 %.  General: NAD   Eyes:  FARRAH, sclera anicteric   Nose/Mouth/Throat: OP clear, buccal mucosa moist, no ulcerations   Lungs: CTA bilaterally  Cardiovascular: RRR- appears to ave increased work of breathing but lungs CTAB. NO murmurs no JVD noted on exam.   Abdominal/Rectal: +BS, soft, NT, ND, No HSM   Lymphatics: no edema  Skin: no rashes or petechaie  Neuro: A&O   Additional Findings: Hsieh site NT, no drainage.  Labs:  Lab Results   Component Value Date    WBC 3.6 (L) 10/18/2019    ANEU 3.3 10/18/2019    HGB 9.2 (L) 10/18/2019    HCT 27.7 (L) 10/18/2019    PLT 24 (LL) 10/18/2019     (L) 10/18/2019    POTASSIUM 3.4 10/18/2019    CHLORIDE 100 10/18/2019    CO2 24 10/18/2019     (H) 10/18/2019    BUN 14 10/18/2019    CR 0.98 10/18/2019    MAG 1.7 09/27/2019    INR 1.27 (H) 10/16/2019       Imaging: Reviewed  Microbiology:  Reviewed    Assessment and Plan:   Angel Yanez is a 59 year old male d+153 s/p autologous transplant for MM readmitted with 3 days of fevers. Recently admitted 9/23-9/30 for same thing.      1.  BMT/MM:   Slow engraftment; cell dose was only 0.639x10^6. ([facility] - known poor cell dose as failed chemo-mobilization 1/2019.)   - Stringent CR.     2.  HEME: Keep Hgb>8g/dL, plt >10. Increase plt parameter >50 for possible  lymphnode bx  - GCSF as needed. Last on 10/8/19.   - Ongoing cytopenias - low cell dose with transplant.       3.  ID: Fevers again since 10/15.   Not neutropenic. ANC 2400. Really nothing localizing?   - 10/16 repeat chest CT: persistent mediastinal adenopathy no paranchymal disease.     - Consult IR for lymphnode bx- they feel axilla lymphadenopathy is too small for anything but FNA and mediastinal adenopathy is much more suspicious looking.   - Consult Interventional pulmonary for consideration of transbronchial lymphnode bx- they feel their yield would be poor, asked GI to evaluate for consideration of endoscopic biopsy. They also feel safest approach given thrombocytopenia is axillary node.   - IR to obtain Right axillary node bx today. Studies ordered.     - Appreciate ID recs - all studies orders per their request  - Per ID start itraconazole 200mg TID for possible histoplasmosis.   - Blood cx NGTD 10/15    - Continue empiric cefepime.   - Hospital work up: 9/25 Crypto neg, parasite stain negative. Asp GM/fungitell neg, EBV <3000. RVP positive for rhinovirus- not cause of fevers given really no cold symptoms. CRP is elevated at 160. blood cultures negative.  UA unremarkable, and urine streptococcal and legionella antigens-negative.  - Continue prophylactic ACV  - PJP prophy due- give pentamidine 10/17  - On CMV vaccine trial.   - CMV neg and EBV 1203 (10/8)     4. Pulm: Not hypoxic prior stay. Looks like increased work of breathing which he does look like intermittently while febrile last stay  - 10/17: Echo with preserved EF, no evidence of right heart strain  -10/17 VQ scan neg for PE    5. GI: no complaints.   - GI prophylaxis: omeprazole.    - Alk phos elevated likely to GCSF as improves when doesn't receive doses.      5.  FEN/Renal: Creat, lytes wnl.   - PRN lyte replacement per standing protocol.  -NPO today for lymphnode bx in IR>        7.  Mood: Continue Paxil.        Keep admitted for work up of  fever of unknown origin.     Myrna Marrufo PA-C  [phone]    Attending Summary  The patient was seen and examined by me separate from the midlevel provider.The note above reflects my assessment and plan. I have personally reviewed today's labs,vital and radiology results. The points of care that were added by me are:     History and physical  S/p 2nd ASCT for MM in 5/2019.  Admitted for recurrent fevers and severe fatigue and dyspnea.    On exam:  Head/mouth/neck: Oropharynx clear.  No lymphadenopathy.  Heart: Regular rate and rhythm.  No murmurs rubs or gallops.  Lungs: Lungs are clear bilaterally.  Abdomen: Abdomen is soft nontender nondistended.  Intact bowel sounds.  Ext: No edema.  Skin: No rash.    Pertinent Labs:  Lab Results   Component Value Date    WBC 3.6 10/18/2019     Lab Results   Component Value Date    RBC 2.60 10/18/2019     Lab Results   Component Value Date    HGB 9.2 10/18/2019     Lab Results   Component Value Date    HCT 27.7 10/18/2019     No components found for: MCT  Lab Results   Component Value Date     10/18/2019     Lab Results   Component Value Date    MCH 35.4 10/18/2019     Lab Results   Component Value Date    MCHC 33.2 10/18/2019     Lab Results   Component Value Date    RDW 19.6 10/18/2019     Lab Results   Component Value Date    PLT 29 10/18/2019     ASSESSMENT AND PLAN  1.  MM:  S/p 2nd ASCT  2.  Fevers:  CT chest shows scattered adenopathy.  EBV PCR pending.  Coordinating lymph node biopsy for diagnosis.  3.  Dyspnea:  VQ negative for PE.  TTE normal.    Anuel Lanier MD

## 2019-10-18 NOTE — PROCEDURES
"Angel Yanez \"Guille\"  MRN: [id number]    Completed ultrasound guided LEFT axially lymph node biopsy. Six passes made with adequate sample confirmed by pathology. Dx: Fever of undetermined origin; Status post bone marrow transplant. Elly.  LOCAL     "

## 2019-10-18 NOTE — PROGRESS NOTES
Sepsis Evaluation Progress Note    I was called to see Angel Yanez due to abnormal vital signs triggering the Sepsis SIRS screening alert. He is suspected to have an infection.     Physical Exam   Vital Signs:  Temp: 102.5  F (39.2  C) Temp src: Oral BP: (!) 145/81 Pulse: 118 Heart Rate: 117 Resp: 20 SpO2: 92 % O2 Device: None (Room air)      Lab:  Lactic Acid   Date Value Ref Range Status   10/17/2019 2.1 (H) 0.7 - 2.0 mmol/L Final     Lactate for Sepsis Protocol   Date Value Ref Range Status   10/16/2019 1.8 0.7 - 2.0 mmol/L Final       The patient is at baseline mental status.     The rest of their physical exam is significant for no significant change from exam documented earlier today.    Assessment & Plan   NO EVIDENCE OF SEPSIS at this time.  Vital sign, physical exam, and lab findings are likely due to febrile process. VS have not changed since earlier this morning. ID has been following. He has been on cefepime with consideration for addition of itraconazole. Feeling better this evening, cough slightly improving.    Disposition: The patient will remain on the current unit. We will continue to monitor this patient closely.  Ivan Barr MD    Sepsis Criteria   Sepsis: 2+ SIRS criteria due to infection  Severe Sepsis: Sepsis AND 1+ new sign of acute organ dysfunction (Note: lactate >2 is organ dysfunction)  Septic Shock: Sepsis AND hypotension despite volume resuscitation with 30 ml/kg crystalloid

## 2019-10-18 NOTE — CONSULTS
Patient is on IR schedule 10/18/2019 for a Image guided axillary lymph node biopsy, radiologist choice.   Platelets are 29 after 1 unit this AM. Team has another unit to hang for the procedure.  Pt is NPO.  Consent will be done prior to procedure.    Please contact the IR charge RN at [phone] for estimated time of procedure.     Case discussed with Dr. Kelley from IR, Dr. Ramirez and Myrna Marrufo PA-C. This is a 59 year old male d+152 s/p autologous transplant for MM readmitted with 3 days of fevers. CT of the chest shows mediastinal and axillary lymphadenopathy concerning for lymphoproliferative disease and/or PTLD. After discussion with multiple services, it was decided the safest biopsy is an axillary lymph node. Will plan for US guided biopsy with IR today.    Diagnostic lab orders need to be entered before the patient will be brought to IR. To be entered by Myrna Marrufo PA-C.    Hilary Berry DNP, APRN  Interventional Radiology  Pager: [phone]

## 2019-10-18 NOTE — PLAN OF CARE
Febrile most of the shift, TMAX: 102.2. Given tylenol x2 and celebrex x1. SBPs in the 150s and tachycardic, and RR 20-24 (worsens with fever and dry cough). O2 sats 94% on RA, reports some SOB at times. Blood cultures sent x1. No c/o N/V/D/pain. Pt was NPO at midnight and received 1 unit platelets to keep platelets above 50 for lymph node biopsy scheduled for today.    Problem: Infection  Goal: Infection Symptom Resolution  10/18/2019 0601 by Dusty Enciso  Outcome: No Change     Problem: Adult Inpatient Plan of Care  Goal: Plan of Care Review  10/18/2019 0601 by Dusty Enciso  Outcome: No Change     Problem: Fever (Fever with Neutropenia)  Goal: Baseline Body Temperature  Outcome: No Change

## 2019-10-18 NOTE — PROGRESS NOTES
Patient Name: Angel Yanez  Medical Record Number: [id number]  Today's Date: 10/18/2019    Procedure: Lymph Node Biopsy  Proceduralist: Alfredito Sanabria PA-C    Procedure start time: 1345  Puncture time: 1350  Procedure end time: 1415    Report given to: Medardo TSAI    Other Notes: Pt arrived to IR room 6 from . Consent reviewed. Pt denies any questions or concerns regarding procedure. Pt positioned supine and monitored per protocol. Pt tolerated procedure without any noted complications. Pt transferred back to .

## 2019-10-18 NOTE — PROGRESS NOTES
Assessment completed on 5/6/2019. Please see information below for inpatient review.        Blood and Marrow Transplant   Psychosocial Assessment with   Clinical      REPEAT Assessment completed on 5/6/2019 of living situation, support system, financial status, functional status, coping, stressors, need for resources and social work intervention provided as needed. Information for this assessment was provided by pt's report in addition to medical chart review and consultation with medical team.      Present at assessment:   Patient: Angel Yanez  : ZUHAIR Woodard, PAWEL     Diagnosis: Multiple Myeloma     Transplant type: Autologous     Donor: Autologous      Physician: Adebayo Lucio MD     Nurse Coordinator: Su Pate RN     Permanent Address:   [address] [place] [id number]     Contact Information:  Pt's Home Phone: [phone]  Pt's Cell Phone: [phone]  Pt Email: [email or web address]  Wife-Jennifer Yanez's Phone: [phone]  Son-Dalton Yanez's Phone: [phone]     Presenting Information:  Zaheer is a 58 year old male diagnosed with multiple myeloma who presents for evaluation for autologous transplant at the [facility] ([facility] This will be pt's 2nd autologous transplant at the [facility].     Decision Making: Self     Health Care Directive: Yes. Pt has completed the healthcare directive completed and on file.      Relationship Status: . Pt described relationship as stable and supportive.      Special Needs: None identified at this time.      Family/Support System: Pt endorsed a good support system including family and close friends who will be available to support pt throughout transplant process.      Spouse: Jennifer Yanez  Children: 3 Children; Son-Alivia Yanez (28-Lives in [place], [place]), Son-Magdiel Yanez (Lives in [place], [place]) and Daughter-Jerry Yanez (Lives in [place])  Grandchildren: 1 Grandson-Alivia Tyler (1.5 years  "old)  Parents:   Siblings: 1 Sister-Sofie Calvin (Lives in [place], [place])     Caregiver: JANELL discussed with pt the caregiver role and expectation at length. During last PSA on 1/15/2019, pt said he won t have a caregiver 24/7 but will tell me he does if that is required and pt said SW can put his sonLucille down as well  if Social Work needed another name on the caregiver contract . During the PSA today (2019), pt said \"oh yea I will have a caregiver\". Pt's wife runs a small  out of the home, so she is there during the day. SW reminded pt of the last conversation and SW repeatedly reminded pt he is required to have a caregiver 24/7 for 30 days. Pt signed the caregiver contract which will be scanned into the EMR. Pt said his wife-Jennifer and son-Dalton will be the caregivers. Caregiver education and resources provided. EULALIO Delgadillo, and Andrew are aware. Dr. Lucio said he would reinforce the caregiver requirement during the close appointment.       Caregiver Contact Information:  Wife-Jennifer Yanez's Phone: [phone]  SonKiera Yanez's Phone: [phone]     Transportation Mode: Private Vehicle. During last PSA on 1/15/2019, pt said he will be driving himself to clinic post-transplant. JANELL thoroughly explained to pt that he cannot drive post-transplant until approved by a Physician. EULALIO Delgadillo, and Andrew were notified. Dr. Lucio said he would reinforce the \"no driving\" during the close appointment as well.      Insurance: Pt has [employer] health insurance; pt has an . Pt denied specific insurance concerns at this time. JANELL reiterated information about the [employer]  should specific insurance questions arise as pt moves through transplant process. Future Insurance questions referred to [employer] -Jessica Downs (P: [phone]).      Sources of Income: Pt's income, pt went back to work, and his " "wife's income. Once pt completely stops working, pt will re-file for short term disability. Pt also has long term disability available. Pt denied anticipation of financial hardship related to BMT at this time. SW provided information on gay options and encouraged pt to contact this SW for support should financial situation change.      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 Pt's wife-Jennifer owns her own  at home.      Mental Health: Pt denied a history of mental health concerns, specific diagnoses or medications at this time. SW explained that it's not uncommon for patients going through transplant to experience symptoms of depression/anxiety.      PHQ-9:  Pt scored a 4 which indicates no sign of depression on the depression severity scale. Pt does not endorse feelings  of depression at this time.      GAD7:  Pt scored a 3 which indicates no sign of anxiety on the Generalized Anxiety Disorder Questionnaire. Pt endorses this is an accurate reflection of his emotional state.     Chemical Use: Pt reported that he quit smoking 35 years ago. Pt reported minimal alcohol consumption; 2 beers in the past 3 months. Pt reported no hx of marijuana or other drugs. Based on the information provided, there appear to be no specific risks or concerns identified at this time.      Trauma/Loss/Abuse History: Multiple losses associated with cancer diagnosis and treatment, including health, employment, changes to physical appearance, etc.      Spirituality: Pt would not like a blessing ceremony while inpatient. SW explained that there are Chaplains on the unit and pt can request to meet with a  at anytime.     Coping: Pt noted that he is currently feeling \"frustrated and ready to begin\". Pt shared that his main coping mechanisms are exercise (riding bike). SW and pt discussed additional positive coping mechanisms that pt can utilize while in the hospital. While hospitalized, pt plans to walk the " hallways and watch tv.      Education Provided: Transplant process expectations, Caregiver requirements, Caregiver self-care, Financial issues related to transplant, Financial resources/grants available, Common psychosocial stressors pre/post transplant, Support group(s) available, Hospital resources available, Social Work role and Resources for grandchild.     Interventions Provided: Psychosocial Support and Education      Assessment and Recommendations for Team:  Pt is a 58 year old male diagnosed with multiple myeloma who is here undergoing preparation for a planned autologous transplant. Pt appeared to be in good spirits during conversation. Pt is pleasant and able to articulate concerns/coping mechanisms in an appropriate manner. Pt feels comfortable communicating with the medical team. Pt has a good supportive network of family and friends who are involved. Pt has developed a few coping mechanisms such as exercising and working on his hobbies. Pt may benefit from assistance in developing coping mechanisms. Pt and pt's family will benefit from ongoing psychosocial support in regards to coping with the adjustment to the BMT process.      Pt has a good support system. Please see caregiver section for update. Pt verbalizes understanding of the transplant process and wanting to proceed. SW provided contact information and encouraged pt to contact SW with questions, concerns, resources and for support.     Per this assessment, SW did not identify any barriers to this patient moving forward with transplant.     Important Information:   -See caregiver section. Check in with pt while inpatient to re-confirm plan.  -Pt said he plans to drive to clinic appointments post-transplant. Dr. Lucio notified and Dr. Lucio was going to tell pt he cannot drive.     Follow up Planned:   Psychosocial support     Christie MOFFETT, [facility]  Phone: [phone]  Pager: [phone]

## 2019-10-18 NOTE — PLAN OF CARE
Patient post plt count was 29 K, given another infusion of plts before and during his left axilla biopsy of a lymph node. [place] has some bloody drainage underneath it , marked with black marker.

## 2019-10-19 LAB
ANION GAP SERPL CALCULATED.3IONS-SCNC: 10 MMOL/L (ref 3–14)
ANISOCYTOSIS BLD QL SMEAR: ABNORMAL
B19V IGM SER IA-ACNC: 0.09 IV
BACTERIA SPEC CULT: NORMAL
BASOPHILS # BLD AUTO: 0 10E9/L (ref 0–0.2)
BASOPHILS NFR BLD AUTO: 0 %
BUN SERPL-MCNC: 16 MG/DL (ref 7–30)
CALCIUM SERPL-MCNC: 8.1 MG/DL (ref 8.5–10.1)
CHLORIDE SERPL-SCNC: 101 MMOL/L (ref 94–109)
CO2 SERPL-SCNC: 23 MMOL/L (ref 20–32)
CREAT SERPL-MCNC: 0.93 MG/DL (ref 0.66–1.25)
DIFFERENTIAL METHOD BLD: ABNORMAL
EOSINOPHIL # BLD AUTO: 0.4 10E9/L (ref 0–0.7)
EOSINOPHIL NFR BLD AUTO: 10.7 %
ERYTHROCYTE [DISTWIDTH] IN BLOOD BY AUTOMATED COUNT: 18.7 % (ref 10–15)
GFR SERPL CREATININE-BSD FRML MDRD: 88 ML/MIN/{1.73_M2}
GLUCOSE SERPL-MCNC: 105 MG/DL (ref 70–99)
HCT VFR BLD AUTO: 26 % (ref 40–53)
HGB BLD-MCNC: 8.6 G/DL (ref 13.3–17.7)
LACTATE BLD-SCNC: 1.6 MMOL/L (ref 0.7–2)
LYMPHOCYTES # BLD AUTO: 0.3 10E9/L (ref 0.8–5.3)
LYMPHOCYTES NFR BLD AUTO: 8.9 %
Lab: NORMAL
MACROCYTES BLD QL SMEAR: PRESENT
MCH RBC QN AUTO: 34.7 PG (ref 26.5–33)
MCHC RBC AUTO-ENTMCNC: 33.1 G/DL (ref 31.5–36.5)
MCV RBC AUTO: 105 FL (ref 78–100)
MONOCYTES # BLD AUTO: 0.2 10E9/L (ref 0–1.3)
MONOCYTES NFR BLD AUTO: 4.5 %
NEUTROPHILS # BLD AUTO: 2.6 10E9/L (ref 1.6–8.3)
NEUTROPHILS NFR BLD AUTO: 75.9 %
PLATELET # BLD AUTO: 23 10E9/L (ref 150–450)
PLATELET # BLD EST: ABNORMAL 10*3/UL
POTASSIUM SERPL-SCNC: 3.5 MMOL/L (ref 3.4–5.3)
RBC # BLD AUTO: 2.48 10E12/L (ref 4.4–5.9)
SODIUM SERPL-SCNC: 133 MMOL/L (ref 133–144)
SPECIMEN SOURCE: NORMAL
WBC # BLD AUTO: 3.4 10E9/L (ref 4–11)

## 2019-10-19 PROCEDURE — 86850 RBC ANTIBODY SCREEN: CPT | Performed by: INTERNAL MEDICINE

## 2019-10-19 PROCEDURE — 83605 ASSAY OF LACTIC ACID: CPT | Performed by: INTERNAL MEDICINE

## 2019-10-19 PROCEDURE — 25000132 ZZH RX MED GY IP 250 OP 250 PS 637: Performed by: NURSE PRACTITIONER

## 2019-10-19 PROCEDURE — 25000128 H RX IP 250 OP 636: Performed by: NURSE PRACTITIONER

## 2019-10-19 PROCEDURE — 36415 COLL VENOUS BLD VENIPUNCTURE: CPT | Performed by: INTERNAL MEDICINE

## 2019-10-19 PROCEDURE — 86900 BLOOD TYPING SEROLOGIC ABO: CPT | Performed by: INTERNAL MEDICINE

## 2019-10-19 PROCEDURE — 80048 BASIC METABOLIC PNL TOTAL CA: CPT | Performed by: INTERNAL MEDICINE

## 2019-10-19 PROCEDURE — 86901 BLOOD TYPING SEROLOGIC RH(D): CPT | Performed by: INTERNAL MEDICINE

## 2019-10-19 PROCEDURE — 20600000 ZZH R&B BMT

## 2019-10-19 PROCEDURE — 87040 BLOOD CULTURE FOR BACTERIA: CPT | Performed by: INTERNAL MEDICINE

## 2019-10-19 PROCEDURE — 25000128 H RX IP 250 OP 636: Performed by: INTERNAL MEDICINE

## 2019-10-19 PROCEDURE — 40000141 ZZH STATISTIC PERIPHERAL IV START W/O US GUIDANCE

## 2019-10-19 PROCEDURE — 86923 COMPATIBILITY TEST ELECTRIC: CPT | Performed by: INTERNAL MEDICINE

## 2019-10-19 PROCEDURE — 25000132 ZZH RX MED GY IP 250 OP 250 PS 637: Performed by: PHYSICIAN ASSISTANT

## 2019-10-19 PROCEDURE — 85025 COMPLETE CBC W/AUTO DIFF WBC: CPT | Performed by: INTERNAL MEDICINE

## 2019-10-19 RX ADMIN — ACETAMINOPHEN 650 MG: 325 TABLET, FILM COATED ORAL at 19:25

## 2019-10-19 RX ADMIN — ACETAMINOPHEN 650 MG: 325 TABLET, FILM COATED ORAL at 12:04

## 2019-10-19 RX ADMIN — ITRACONAZOLE 200 MG: 100 CAPSULE ORAL at 19:26

## 2019-10-19 RX ADMIN — MELATONIN 2000 UNITS: at 08:40

## 2019-10-19 RX ADMIN — CEFEPIME HYDROCHLORIDE 2 G: 2 INJECTION, POWDER, FOR SOLUTION INTRAVENOUS at 08:37

## 2019-10-19 RX ADMIN — CEFEPIME HYDROCHLORIDE 2 G: 2 INJECTION, POWDER, FOR SOLUTION INTRAVENOUS at 23:53

## 2019-10-19 RX ADMIN — ITRACONAZOLE 200 MG: 100 CAPSULE ORAL at 08:40

## 2019-10-19 RX ADMIN — PAROXETINE HYDROCHLORIDE HEMIHYDRATE 20 MG: 20 TABLET, FILM COATED ORAL at 08:40

## 2019-10-19 RX ADMIN — CEFEPIME HYDROCHLORIDE 2 G: 2 INJECTION, POWDER, FOR SOLUTION INTRAVENOUS at 16:45

## 2019-10-19 RX ADMIN — ACETAMINOPHEN 650 MG: 325 TABLET, FILM COATED ORAL at 04:37

## 2019-10-19 RX ADMIN — ACYCLOVIR 800 MG: 800 TABLET ORAL at 08:40

## 2019-10-19 RX ADMIN — ITRACONAZOLE 200 MG: 100 CAPSULE ORAL at 14:16

## 2019-10-19 RX ADMIN — SODIUM CHLORIDE 500 ML: 9 INJECTION, SOLUTION INTRAVENOUS at 05:55

## 2019-10-19 RX ADMIN — ACYCLOVIR 800 MG: 800 TABLET ORAL at 19:26

## 2019-10-19 RX ADMIN — CEFEPIME HYDROCHLORIDE 2 G: 2 INJECTION, POWDER, FOR SOLUTION INTRAVENOUS at 00:05

## 2019-10-19 ASSESSMENT — ACTIVITIES OF DAILY LIVING (ADL)
ADLS_ACUITY_SCORE: 12

## 2019-10-19 NOTE — PLAN OF CARE
Patient with no complaints, did have a mild fever of 100.3 AX. Given tylenol for temp, patient able to shower later in shift. Axillary biopsy site looks fine, dry and intact.

## 2019-10-19 NOTE — PLAN OF CARE
Problem: Adult Inpatient Plan of Care  Goal: Plan of Care Review  10/18/2019 1925 by Johana Álvarez, RN  Outcome: No Change   S-pt with old bld on left ax. Node bx. Marked on dressing to monitor it spreading and active bleed. Not evident at this time. Pt received plts x2 prior to bx. Able to eat full meal and pt thinks ate too much of his main coarse. Lungs clear except dim bases and fine crackles r lower lobe.  B-pt admit with cough-sob and fatigue dxrelapse  A-pt with respiratory status compromise  R-f/up with bx results monitor temp curve. Assess for s/s of infection    Problem: Infection Risk (Fever with Neutropenia)  Goal: Absence of Infection  10/18/2019 1925 by Johana Álvarez, RN  Outcome: Declining   Pt with increased cough dry and hacking with SOB associated with it. O2 sats remain wnl    Pt not saving urine--instructed to do so and gave reason as to why.

## 2019-10-19 NOTE — PLAN OF CARE
Patient had elevated temperature of 103.9 AX. Antibiotic coverage with Cefepime Given Tylenol once overnight. Diaphoretic afterwards. Temp decreased to 98.1 AX by morning. Heart rate tachycardic at time of febrile episode. Blood cultures x 2 collected by venipuncture. Lactic acid pop-up; 1.6 today. NS bolus of 500 ml given over 1 hour via peripheral IV in the morning. IV saline locked between meds per patient request. Voiding ok. Intermittent harsh cough. Sats ok on room air. He declined cough meds. Lymph node biopsy in left axilla; remains covered by dressing, dried drainage boundary marked on dressing. No change overnight. Patient denies discomfort in affected jami area. Platelets 23K this morning; needs platelets if increased parameter of 50K from yesterday's procedure is continued.

## 2019-10-19 NOTE — PROVIDER NOTIFICATION
Provider notified of elevated temperature of 103.9 AX. On Cefepime. Tylenol given. Blood cultures and lactic acid ordered for lab collect. Denies chills. Heart rate tachycardic at 119. Ongoing dry, non-productive cough. Saline locked with peripheral IV.

## 2019-10-19 NOTE — PROGRESS NOTES
BMT Daily Progress Note     Mr. Guille Yanez is 59 y.o male with hx of Multiple Myeloma, s/p 2nd Autologous pbsct 5/2019. Readmitted for 2nd time in 1 month with fevers of unknown source.   Overnight events: Febrile to 103.9 overnight with drenching sweats this morning. Persistant cough & GRECO.  Eating OK with no N/V/D. No pain, bleeding or rash  Review of Systems: 10 point ROS negative except as noted above.    Physical Exam:   Blood pressure 110/69, pulse 76, temperature 97  F (36.1  C), temperature source Axillary, resp. rate 16, weight 80.6 kg (177 lb 9.6 oz), SpO2 93 %.  General: NAD   Eyes:  FARRAH, sclera anicteric   Nose/Mouth/Throat: OP clear, buccal mucosa moist, no ulcerations   Lungs: CTA bilaterally  Cardiovascular: RRR  Abdominal/Rectal: +BS, soft, NT, ND, No HSM   Lymphatics: Did not palpate R axillary LN, bx done just yesterday  Skin: no rashes or petechaie  Neuro: A&O   Additional Findings: Hsieh site NT, no drainage.  Labs:  Lab Results   Component Value Date    WBC 3.4 (L) 10/19/2019    ANEU 2.6 10/19/2019    HGB 8.6 (L) 10/19/2019    HCT 26.0 (L) 10/19/2019    PLT 23 (LL) 10/19/2019     10/19/2019    POTASSIUM 3.5 10/19/2019    CHLORIDE 101 10/19/2019    CO2 23 10/19/2019     (H) 10/19/2019    BUN 16 10/19/2019    CR 0.93 10/19/2019    MAG 1.7 09/27/2019    INR 1.27 (H) 10/16/2019       Imaging: Reviewed  Microbiology:  Reviewed    Assessment and Plan:   Angel Yanez is a 59 year old male d+155 s/p autologous transplant for MM readmitted with 3 days of fevers. Recently admitted 9/23-9/30 for same thing.      1.  BMT/MM:   Slow engraftment; cell dose was only 0.639x10^6. ([facility] [place] - known poor cell dose as failed chemo-mobilization 1/2019.)   - Stringent CR.     2.  HEME: Keep Hgb>8g/dL, plt >10.   - GCSF as needed. Last on 10/8/19.   - Ongoing cytopenias - low cell dose with transplant.       3.  ID: Persistant fevers. Not neutropenic, no localizing symptoms other than cough.     - 10/16 repeat chest CT: persistent mediastinal adenopathy no paranchymal disease.     - S/P bx of R axillary node on 10/18, awaiting results.    - Consult Interventional pulmonary for consideration of transbronchial lymphnode bx- they feel their yield would be poor, asked GI to evaluate for consideration of endoscopic biopsy. They also feel safest approach given thrombocytopenia is axillary node.     - Appreciate ID recs - all studies orders per their request  - Per ID start itraconazole 200mg TID for possible histoplasmosis.   - Blood cx NGTD 10/15    - Continue empiric cefepime.   - Hospital work up: 9/25 Crypto neg, parasite stain negative. Asp GM/fungitell neg, EBV <3000. RVP positive for rhinovirus- not cause of fevers given really no cold symptoms. CRP is elevated at 160. blood cultures negative.  UA unremarkable, and urine streptococcal and legionella antigens-negative.  - Continue prophylactic ACV  - PJP prophy pentamidine 10/17  - On CMV vaccine trial.   - CMV neg and EBV 1203 (10/8)     4. Pulm: Not hypoxic but GRECO  - 10/17: Echo with preserved EF, no evidence of right heart strain  -10/17 VQ scan neg for PE    5. GI: no complaints.   - GI prophylaxis: omeprazole.    - Alk phos elevated likely to GCSF as improves when doesn't receive doses.      5.  FEN/Renal: Creat, lytes wnl.   - PRN lyte replacement per standing protocol.    7.  Mood: Continue Paxil.        Keep admitted for work up of fever of unknown origin.     Karla Manzo  Attending Summary  The patient was seen and examined by me separate from the midlevel provider.The note above reflects my assessment and plan. I have personally reviewed today's labs,vital and radiology results. The points of care that were added by me are:     History and physical  S/p 2nd ASCT for MM in 5/2019.  Admitted for recurrent fevers and severe fatigue and dyspnea.    On exam:  Head/mouth/neck: Oropharynx clear.  No lymphadenopathy.  Heart: Regular rate and  rhythm.  No murmurs rubs or gallops.  Lungs: Lungs are clear bilaterally.  Abdomen: Abdomen is soft nontender nondistended.  Intact bowel sounds.  Ext: No edema.  Skin: No rash.    Pertinent Labs:  Lab Results   Component Value Date    WBC 3.4 10/19/2019     Lab Results   Component Value Date    RBC 2.48 10/19/2019     Lab Results   Component Value Date    HGB 8.6 10/19/2019     Lab Results   Component Value Date    HCT 26.0 10/19/2019     No components found for: MCT  Lab Results   Component Value Date     10/19/2019     Lab Results   Component Value Date    MCH 34.7 10/19/2019     Lab Results   Component Value Date    MCHC 33.1 10/19/2019     Lab Results   Component Value Date    RDW 18.7 10/19/2019     Lab Results   Component Value Date    PLT 23 10/19/2019     ASSESSMENT AND PLAN  1.  MM:  S/p 2nd ASCT  2.  Fevers:  CT chest shows scattered adenopathy.  EBV PCR pending.  Awaiting pathology results from lymph node biopsy.  3.  Dyspnea:  VQ negative for PE.  TTE normal.    Anuel Lanier MD

## 2019-10-20 LAB
ANION GAP SERPL CALCULATED.3IONS-SCNC: 8 MMOL/L (ref 3–14)
ANISOCYTOSIS BLD QL SMEAR: ABNORMAL
B PARAPERT DNA SPEC QL NAA+PROBE: NOT DETECTED
B PERT DNA SPEC QL NAA+PROBE: NOT DETECTED
B19V IGG SER IA-ACNC: 1.14 IV
BASOPHILS # BLD AUTO: 0 10E9/L (ref 0–0.2)
BASOPHILS NFR BLD AUTO: 0 %
BLD PROD TYP BPU: NORMAL
BLD UNIT ID BPU: 0
BLOOD PRODUCT CODE: NORMAL
BORDETELLA COMMENT: NORMAL
BPU ID: NORMAL
BUN SERPL-MCNC: 16 MG/DL (ref 7–30)
CALCIUM SERPL-MCNC: 8.1 MG/DL (ref 8.5–10.1)
CHLORIDE SERPL-SCNC: 105 MMOL/L (ref 94–109)
CO2 SERPL-SCNC: 23 MMOL/L (ref 20–32)
CREAT SERPL-MCNC: 0.79 MG/DL (ref 0.66–1.25)
DIFFERENTIAL METHOD BLD: ABNORMAL
EOSINOPHIL # BLD AUTO: 0.4 10E9/L (ref 0–0.7)
EOSINOPHIL NFR BLD AUTO: 16.8 %
ERYTHROCYTE [DISTWIDTH] IN BLOOD BY AUTOMATED COUNT: 18.6 % (ref 10–15)
GFR SERPL CREATININE-BSD FRML MDRD: >90 ML/MIN/{1.73_M2}
GLUCOSE SERPL-MCNC: 92 MG/DL (ref 70–99)
HCT VFR BLD AUTO: 23.4 % (ref 40–53)
HGB BLD-MCNC: 7.6 G/DL (ref 13.3–17.7)
LACTATE BLD-SCNC: 1.7 MMOL/L (ref 0.7–2)
LYMPHOCYTES # BLD AUTO: 0.4 10E9/L (ref 0.8–5.3)
LYMPHOCYTES NFR BLD AUTO: 16.8 %
MAGNESIUM SERPL-MCNC: 1.9 MG/DL (ref 1.6–2.3)
MCH RBC QN AUTO: 34.7 PG (ref 26.5–33)
MCHC RBC AUTO-ENTMCNC: 32.5 G/DL (ref 31.5–36.5)
MCV RBC AUTO: 107 FL (ref 78–100)
MISCELLANEOUS TEST: NORMAL
MONOCYTES # BLD AUTO: 0.1 10E9/L (ref 0–1.3)
MONOCYTES NFR BLD AUTO: 5.6 %
NEUTROPHILS # BLD AUTO: 1.3 10E9/L (ref 1.6–8.3)
NEUTROPHILS NFR BLD AUTO: 60.8 %
OVALOCYTES BLD QL SMEAR: SLIGHT
PHOSPHATE SERPL-MCNC: 3.2 MG/DL (ref 2.5–4.5)
PLATELET # BLD AUTO: 13 10E9/L (ref 150–450)
PLATELET # BLD EST: ABNORMAL 10*3/UL
POIKILOCYTOSIS BLD QL SMEAR: SLIGHT
POTASSIUM SERPL-SCNC: 3 MMOL/L (ref 3.4–5.3)
POTASSIUM SERPL-SCNC: 3.3 MMOL/L (ref 3.4–5.3)
RBC # BLD AUTO: 2.19 10E12/L (ref 4.4–5.9)
SODIUM SERPL-SCNC: 136 MMOL/L (ref 133–144)
TRANSFUSION STATUS PATIENT QL: NORMAL
TRANSFUSION STATUS PATIENT QL: NORMAL
WBC # BLD AUTO: 2.1 10E9/L (ref 4–11)

## 2019-10-20 PROCEDURE — 25000132 ZZH RX MED GY IP 250 OP 250 PS 637: Performed by: PHYSICIAN ASSISTANT

## 2019-10-20 PROCEDURE — 87103 BLOOD FUNGUS CULTURE: CPT | Performed by: INTERNAL MEDICINE

## 2019-10-20 PROCEDURE — 87800 DETECT AGNT MULT DNA DIREC: CPT | Performed by: INTERNAL MEDICINE

## 2019-10-20 PROCEDURE — 83735 ASSAY OF MAGNESIUM: CPT | Performed by: INTERNAL MEDICINE

## 2019-10-20 PROCEDURE — 84132 ASSAY OF SERUM POTASSIUM: CPT | Performed by: INTERNAL MEDICINE

## 2019-10-20 PROCEDURE — 84100 ASSAY OF PHOSPHORUS: CPT | Performed by: INTERNAL MEDICINE

## 2019-10-20 PROCEDURE — 80048 BASIC METABOLIC PNL TOTAL CA: CPT | Performed by: INTERNAL MEDICINE

## 2019-10-20 PROCEDURE — 87040 BLOOD CULTURE FOR BACTERIA: CPT | Performed by: INTERNAL MEDICINE

## 2019-10-20 PROCEDURE — 87077 CULTURE AEROBIC IDENTIFY: CPT | Performed by: INTERNAL MEDICINE

## 2019-10-20 PROCEDURE — 83605 ASSAY OF LACTIC ACID: CPT

## 2019-10-20 PROCEDURE — P9040 RBC LEUKOREDUCED IRRADIATED: HCPCS | Performed by: INTERNAL MEDICINE

## 2019-10-20 PROCEDURE — 25000128 H RX IP 250 OP 636: Performed by: NURSE PRACTITIONER

## 2019-10-20 PROCEDURE — 36415 COLL VENOUS BLD VENIPUNCTURE: CPT | Performed by: INTERNAL MEDICINE

## 2019-10-20 PROCEDURE — 87186 SC STD MICRODIL/AGAR DIL: CPT | Performed by: INTERNAL MEDICINE

## 2019-10-20 PROCEDURE — 20600000 ZZH R&B BMT

## 2019-10-20 PROCEDURE — 25000132 ZZH RX MED GY IP 250 OP 250 PS 637: Performed by: NURSE PRACTITIONER

## 2019-10-20 PROCEDURE — 85025 COMPLETE CBC W/AUTO DIFF WBC: CPT | Performed by: INTERNAL MEDICINE

## 2019-10-20 RX ADMIN — ITRACONAZOLE 200 MG: 100 CAPSULE ORAL at 14:00

## 2019-10-20 RX ADMIN — CEFEPIME HYDROCHLORIDE 2 G: 2 INJECTION, POWDER, FOR SOLUTION INTRAVENOUS at 07:51

## 2019-10-20 RX ADMIN — PAROXETINE HYDROCHLORIDE HEMIHYDRATE 20 MG: 20 TABLET, FILM COATED ORAL at 07:50

## 2019-10-20 RX ADMIN — ACETAMINOPHEN 650 MG: 325 TABLET, FILM COATED ORAL at 05:09

## 2019-10-20 RX ADMIN — POTASSIUM CHLORIDE 40 MEQ: 750 TABLET, EXTENDED RELEASE ORAL at 07:51

## 2019-10-20 RX ADMIN — POTASSIUM CHLORIDE 20 MEQ: 750 TABLET, EXTENDED RELEASE ORAL at 11:18

## 2019-10-20 RX ADMIN — ACETAMINOPHEN 650 MG: 325 TABLET, FILM COATED ORAL at 16:52

## 2019-10-20 RX ADMIN — ITRACONAZOLE 200 MG: 100 CAPSULE ORAL at 20:15

## 2019-10-20 RX ADMIN — CEFEPIME HYDROCHLORIDE 2 G: 2 INJECTION, POWDER, FOR SOLUTION INTRAVENOUS at 16:46

## 2019-10-20 RX ADMIN — MELATONIN 2000 UNITS: at 07:51

## 2019-10-20 RX ADMIN — ACYCLOVIR 800 MG: 800 TABLET ORAL at 20:15

## 2019-10-20 RX ADMIN — POTASSIUM CHLORIDE 40 MEQ: 750 TABLET, EXTENDED RELEASE ORAL at 21:29

## 2019-10-20 RX ADMIN — ACETAMINOPHEN 650 MG: 325 TABLET, FILM COATED ORAL at 00:10

## 2019-10-20 RX ADMIN — ITRACONAZOLE 200 MG: 100 CAPSULE ORAL at 07:50

## 2019-10-20 RX ADMIN — ACYCLOVIR 800 MG: 800 TABLET ORAL at 07:51

## 2019-10-20 ASSESSMENT — ACTIVITIES OF DAILY LIVING (ADL)
ADLS_ACUITY_SCORE: 12

## 2019-10-20 NOTE — PROGRESS NOTES
BMT Daily Progress Note     Mr. Guille Yanez is 59 y.o male with hx of Multiple Myeloma, s/p 2nd Autologous pbsct 5/2019. Readmitted for 2nd time in 1 month with fevers of unknown source.   Overnight events: Febrile to 102 overnight.  Reports feeling a little better yesterday afternoon & evening with decreased cough & nosweats overnight.  Eating OK with no N/V/D. No pain, bleeding or rash  Review of Systems: 10 point ROS negative except as noted above.    Physical Exam:   Blood pressure 126/82, pulse 100, temperature 98.2  F (36.8  C), temperature source Axillary, resp. rate 18, weight 80.6 kg (177 lb 9.6 oz), SpO2 95 %.  General: NAD   Eyes:  FARRAH, sclera anicteric   Nose/Mouth/Throat: OP clear, buccal mucosa moist, no ulcerations   Lungs: CTA bilaterally  Cardiovascular: RRR  Abdominal/Rectal: +BS, soft, NT, ND, No HSM   Lymphatics:  No adenopathy appreciated  Skin: no rashes or petechaie  Neuro: A&O   Additional Findings: Hsieh site NT, no drainage.  Labs:  Lab Results   Component Value Date    WBC 2.1 (L) 10/20/2019    ANEU 1.3 (L) 10/20/2019    HGB 7.6 (L) 10/20/2019    HCT 23.4 (L) 10/20/2019    PLT 13 (LL) 10/20/2019     10/20/2019    POTASSIUM 3.0 (L) 10/20/2019    CHLORIDE 105 10/20/2019    CO2 23 10/20/2019    GLC 92 10/20/2019    BUN 16 10/20/2019    CR 0.79 10/20/2019    MAG 1.9 10/20/2019    INR 1.27 (H) 10/16/2019     Assessment and Plan:   Angel Yanez is a 59 year old male d+156 s/p autologous transplant for MM readmitted with 3 days of fevers. Recently admitted 9/23-9/30 for same thing.      1.  BMT/MM:   Slow engraftment; cell dose was only 0.639x10^6. ([facility] [place] - known poor cell dose as failed chemo-mobilization 1/2019.)   - Stringent CR.     2.  HEME: Keep Hgb>8g/dL, plt >10.   - GCSF as needed. Last on 10/8/19.   - Ongoing cytopenias - low cell dose with transplant.       3.  ID: Persistant fevers. Not neutropenic, no localizing symptoms other than cough.    - 10/16 repeat chest  CT: persistent mediastinal adenopathy no paranchymal disease.     - S/P bx of L axillary node on 10/18, results still pending  - Consult Interventional pulmonary for consideration of transbronchial lymphnode bx- they feel their yield would be poor, asked GI to evaluate for consideration of endoscopic biopsy. They also feel safest approach given thrombocytopenia is axillary node.     - Appreciate ID recs - all studies orders per their request  - Per ID start itraconazole 200mg TID for possible histoplasmosis.   - Blood cx NGTD 10/15    - Continue empiric cefepime.   - Hospital work up: 9/25 Crypto neg, parasite stain negative. Asp GM/fungitell neg, EBV <3000. RVP positive for rhinovirus- not cause of fevers given really no cold symptoms. CRP is elevated at 160. blood cultures negative.  UA unremarkable, and urine streptococcal and legionella antigens-negative.  - Continue prophylactic ACV  - PJP prophy pentamidine 10/17  - On CMV vaccine trial.   - CMV neg and EBV 1203 (10/8)     4. Pulm: Not hypoxic but dyspneic  - 10/17: Echo with preserved EF, no evidence of right heart strain  -10/17 VQ scan neg for PE    5. GI: no complaints.   - GI prophylaxis: omeprazole.    - Alk phos elevated likely to GCSF as improves when doesn't receive doses.      5.  FEN/Renal: HypoK.  Replace per SS     7.  Mood: Continue Paxil.        Keep admitted for work up of fever of unknown origin.     Karla Manzo  Attending Summary  The patient was seen and examined by me separate from the midlevel provider.The note above reflects my assessment and plan. I have personally reviewed today's labs,vital and radiology results. The points of care that were added by me are:     History and physical  S/p 2nd ASCT for MM in 5/2019.  Admitted for recurrent fevers and severe fatigue and dyspnea.    On exam:  Head/mouth/neck: Oropharynx clear.  No lymphadenopathy.  Heart: Regular rate and rhythm.  No murmurs rubs or gallops.  Lungs: Lungs are clear  bilaterally.  Abdomen: Abdomen is soft nontender nondistended.  Intact bowel sounds.  Ext: No edema.  Skin: No rash.    Pertinent Labs:  Lab Results   Component Value Date    WBC 3.4 10/19/2019     Lab Results   Component Value Date    RBC 2.48 10/19/2019     Lab Results   Component Value Date    HGB 8.6 10/19/2019     Lab Results   Component Value Date    HCT 26.0 10/19/2019     No components found for: MCT  Lab Results   Component Value Date     10/19/2019     Lab Results   Component Value Date    MCH 34.7 10/19/2019     Lab Results   Component Value Date    MCHC 33.1 10/19/2019     Lab Results   Component Value Date    RDW 18.7 10/19/2019     Lab Results   Component Value Date    PLT 23 10/19/2019     ASSESSMENT AND PLAN  1.  MM:  S/p 2nd ASCT  2.  Fevers:  CT chest shows scattered adenopathy.  EBV PCR low level viremia, will monitor closely.  Awaiting pathology results from left lymph node biopsy on 10/18. Clinically HD stable.  3.  Dyspnea:  VQ negative for PE.  TTE normal. Clinically improving since adding itraconazole for empiric histoplasma coverage.    Dionne Casas MD

## 2019-10-20 NOTE — PLAN OF CARE
Pt had fever spike of 100.9 this afternoon; Tylenol given.  Blood cultures released.    Pt denies pain.    Other VSS    Problem: Adult Inpatient Plan of Care  Goal: Plan of Care Review  10/20/2019 9247 by Monse Nunez, RN  Outcome: No Change

## 2019-10-20 NOTE — PLAN OF CARE
Tmax 102. Tylenol x1. Patient offers no complaints. Walking in halls. Antibiotics infused as ordered. Continue plan of care.     Problem: Infection  Goal: Infection Symptom Resolution  10/19/2019 2214 by Nicky García RN  Outcome: No Change     Problem: Adult Inpatient Plan of Care  Goal: Absence of Hospital-Acquired Illness or Injury  10/19/2019 2214 by Nicky García RN  Outcome: No Change     Problem: Adult Inpatient Plan of Care  Goal: Optimal Comfort and Wellbeing  10/19/2019 2214 by Nicky García RN  Outcome: No Change     Problem: Fever (Fever with Neutropenia)  Goal: Baseline Body Temperature  10/19/2019 2214 by Nicky García RN  Outcome: No Change     Problem: Infection Risk (Fever with Neutropenia)  Goal: Absence of Infection  10/19/2019 2214 by Nicky García RN  Outcome: No Change

## 2019-10-20 NOTE — PROGRESS NOTES
Patient has been assessed for Home Oxygen by a credentialed professional during activity/exercise and in a chronic stable state.    (Activity/Exercise should mimic patient s home activity/exercise and ADLs)     1) Resting saturation on Room Air 95      4) Activity/Exercise saturation on Room Air: 97      Please notify patient s RN Care Coordinator when you ve completed this dot phrase.

## 2019-10-21 LAB
ANION GAP SERPL CALCULATED.3IONS-SCNC: 6 MMOL/L (ref 3–14)
ANISOCYTOSIS BLD QL SMEAR: SLIGHT
B19V DNA SER QL NAA+PROBE: NOT DETECTED
BACTERIA SPEC CULT: NO GROWTH
BASOPHILS # BLD AUTO: 0 10E9/L (ref 0–0.2)
BASOPHILS NFR BLD AUTO: 0 %
BLD PROD TYP BPU: NORMAL
BLD UNIT ID BPU: 0
BLD UNIT ID BPU: 0
BLOOD PRODUCT CODE: NORMAL
BLOOD PRODUCT CODE: NORMAL
BPU ID: NORMAL
BPU ID: NORMAL
BUN SERPL-MCNC: 12 MG/DL (ref 7–30)
C BURNET PH1 IGG SER QL IF: NEGATIVE
C BURNET PH2 IGG SER QL IF: NEGATIVE
CALCIUM SERPL-MCNC: 7.7 MG/DL (ref 8.5–10.1)
CHLORIDE SERPL-SCNC: 105 MMOL/L (ref 94–109)
CO2 SERPL-SCNC: 24 MMOL/L (ref 20–32)
COPATH REPORT: NORMAL
CREAT SERPL-MCNC: 0.69 MG/DL (ref 0.66–1.25)
DIFFERENTIAL METHOD BLD: ABNORMAL
EOSINOPHIL # BLD AUTO: 0.5 10E9/L (ref 0–0.7)
EOSINOPHIL NFR BLD AUTO: 21.1 %
ERYTHROCYTE [DISTWIDTH] IN BLOOD BY AUTOMATED COUNT: 20.3 % (ref 10–15)
F TULAR IGG SER QL: 1 U/ML
F TULAR IGM SER QL: 0 U/ML
GFR SERPL CREATININE-BSD FRML MDRD: >90 ML/MIN/{1.73_M2}
GLUCOSE SERPL-MCNC: 92 MG/DL (ref 70–99)
HCT VFR BLD AUTO: 24.4 % (ref 40–53)
HGB BLD-MCNC: 8.2 G/DL (ref 13.3–17.7)
LAB SCANNED RESULT: NORMAL
LEPTOSPIRA IGM SER QL: NEGATIVE
LYMPHOCYTES # BLD AUTO: 0.9 10E9/L (ref 0.8–5.3)
LYMPHOCYTES NFR BLD AUTO: 37.6 %
Lab: NORMAL
MACROCYTES BLD QL SMEAR: PRESENT
MCH RBC QN AUTO: 34.6 PG (ref 26.5–33)
MCHC RBC AUTO-ENTMCNC: 33.6 G/DL (ref 31.5–36.5)
MCV RBC AUTO: 103 FL (ref 78–100)
MONOCYTES # BLD AUTO: 0 10E9/L (ref 0–1.3)
MONOCYTES NFR BLD AUTO: 0.9 %
NEUTROPHILS # BLD AUTO: 0.9 10E9/L (ref 1.6–8.3)
NEUTROPHILS NFR BLD AUTO: 40.4 %
NUM BPU REQUESTED: 1
OVALOCYTES BLD QL SMEAR: SLIGHT
PLATELET # BLD AUTO: 10 10E9/L (ref 150–450)
POIKILOCYTOSIS BLD QL SMEAR: SLIGHT
POTASSIUM SERPL-SCNC: 3.5 MMOL/L (ref 3.4–5.3)
RBC # BLD AUTO: 2.37 10E12/L (ref 4.4–5.9)
SODIUM SERPL-SCNC: 136 MMOL/L (ref 133–144)
SPECIMEN SOURCE: NORMAL
SPECIMEN SOURCE: NORMAL
TRANSFUSION STATUS PATIENT QL: NORMAL
WBC # BLD AUTO: 2.3 10E9/L (ref 4–11)

## 2019-10-21 PROCEDURE — 80048 BASIC METABOLIC PNL TOTAL CA: CPT | Performed by: INTERNAL MEDICINE

## 2019-10-21 PROCEDURE — 25000128 H RX IP 250 OP 636: Performed by: PHYSICIAN ASSISTANT

## 2019-10-21 PROCEDURE — P9037 PLATE PHERES LEUKOREDU IRRAD: HCPCS | Performed by: NURSE PRACTITIONER

## 2019-10-21 PROCEDURE — 25000128 H RX IP 250 OP 636: Performed by: NURSE PRACTITIONER

## 2019-10-21 PROCEDURE — 25000125 ZZHC RX 250: Performed by: INTERNAL MEDICINE

## 2019-10-21 PROCEDURE — 85025 COMPLETE CBC W/AUTO DIFF WBC: CPT | Performed by: INTERNAL MEDICINE

## 2019-10-21 PROCEDURE — 20600000 ZZH R&B BMT

## 2019-10-21 PROCEDURE — 25000132 ZZH RX MED GY IP 250 OP 250 PS 637: Performed by: NURSE PRACTITIONER

## 2019-10-21 PROCEDURE — 25000132 ZZH RX MED GY IP 250 OP 250 PS 637: Performed by: PHYSICIAN ASSISTANT

## 2019-10-21 PROCEDURE — 36415 COLL VENOUS BLD VENIPUNCTURE: CPT | Performed by: INTERNAL MEDICINE

## 2019-10-21 RX ORDER — OXYMETAZOLINE HYDROCHLORIDE 0.05 G/100ML
2 SPRAY NASAL 3 TIMES DAILY PRN
Status: DISCONTINUED | OUTPATIENT
Start: 2019-10-21 | End: 2019-10-23 | Stop reason: HOSPADM

## 2019-10-21 RX ADMIN — POTASSIUM CHLORIDE 20 MEQ: 750 TABLET, EXTENDED RELEASE ORAL at 00:43

## 2019-10-21 RX ADMIN — Medication 400 MCG: at 13:57

## 2019-10-21 RX ADMIN — ITRACONAZOLE 200 MG: 100 CAPSULE ORAL at 19:28

## 2019-10-21 RX ADMIN — ITRACONAZOLE 200 MG: 100 CAPSULE ORAL at 13:26

## 2019-10-21 RX ADMIN — OXYMETAZOLINE HYDROCHLORIDE 2 SPRAY: 0.05 SPRAY NASAL at 23:59

## 2019-10-21 RX ADMIN — MELATONIN 2000 UNITS: at 07:57

## 2019-10-21 RX ADMIN — ACYCLOVIR 800 MG: 800 TABLET ORAL at 19:28

## 2019-10-21 RX ADMIN — ACYCLOVIR 800 MG: 800 TABLET ORAL at 07:57

## 2019-10-21 RX ADMIN — CEFEPIME HYDROCHLORIDE 2 G: 2 INJECTION, POWDER, FOR SOLUTION INTRAVENOUS at 00:43

## 2019-10-21 RX ADMIN — CEFEPIME HYDROCHLORIDE 2 G: 2 INJECTION, POWDER, FOR SOLUTION INTRAVENOUS at 16:00

## 2019-10-21 RX ADMIN — PAROXETINE HYDROCHLORIDE HEMIHYDRATE 20 MG: 20 TABLET, FILM COATED ORAL at 07:57

## 2019-10-21 RX ADMIN — ITRACONAZOLE 200 MG: 100 CAPSULE ORAL at 07:57

## 2019-10-21 RX ADMIN — CEFEPIME HYDROCHLORIDE 2 G: 2 INJECTION, POWDER, FOR SOLUTION INTRAVENOUS at 07:56

## 2019-10-21 ASSESSMENT — ACTIVITIES OF DAILY LIVING (ADL)
ADLS_ACUITY_SCORE: 12
ADLS_ACUITY_SCORE: 10
ADLS_ACUITY_SCORE: 12

## 2019-10-21 NOTE — PROGRESS NOTES
[facility]  Transplant Infectious Disease Progress Note      Patient:  Angel Yanez, Date of birth 1960, Medical record number [id number]  Date of Visit:  10/21/2019         Assessment and Recommendations:   Recommendations:  - OK to consider de-escalating cefepime to an oral bacterial prophylaxis agent.   - Continue itraconazole 200 mg TID, as trial of a histo-active agent.   - Continue acyclovir as antiviral prophylaxis.  - Continue pentamidine as Pneumocystis prophylaxis.  - Await pending testing for Francisella tularemia, Brucella, Toxoplasma IgM/IgG, Bartonella henselae, Coxiella, Leptospira IgM, urine for blastomyces antigen and histo antigen.  - Await pending pathology from lymph node biopsy.  - Would be ideal for him to receive a flu shot prior to discharge.     Transplant Infectious Disease will continue to follow with you.    Assessment:  Angel Yanez is a 60 yo male who is s/p autologous PBSCT (Cytoxan/Mephalan) on 5/17/19 for MM  Infectious Disease issues include:  - Lymphadenopathy. Multiple enlarged mediastinal and axillary lymph nodes. He is again admitted, this time he will have EUS of mediastinal lymph node. I think it is possible that he has histoplasmosis, even with neg prior U Histo Ag, based on the presence of calcifications in spleen & some of the hilar nodes. Await pending pathology from lymph node biopsy. Await pending testing for Francisella tularemia, Brucella, Toxoplasma IgM/IgG, Bartonella henselae, Coxiella, Leptospira IgM, urine for blastomyces antigen and histo antigen.  - Fever, likely tied to lymphadenopathy. On ~ 9/22/2019 he developed the abrupt onset of high fever without localizable symptoms and found to have new mediastinal and axillary lymphadenopathy. He had minimal improvement with IV cefepime after almost 48 hours of treatment and his procalcitonin was negative. Fungitell neg on 9/24/2019. Histo U Ag neg on 9/27/2019. Cryptococcus  negative. Cocci, blasto, histo serologies neg. Aspergillus galactomannan negative. Parasite stain neg for Anaplasma, Babesia. Quantiferon neg. Chlamydia group IgM antibodies neg. Bartonella red antibodies neg. Next tier of testing pending, as listed in recs above.   - Cough since transplant. May be related to fever and lymphadenopathy. If he has histoplasmosis, and some of the fluconazole that he received with transplant partially suppressed histo, that could explain why he did not always have continuous worsening of symptoms. He had flucon 1/23-2/4/2019, 5/18-8/27/2019. Cough is also very prominent with pertussis, but testing was neg for Bordetella.   - EBV viremia, very low grade. 9/25/19 blood EBV PCR viral load result was 2,215 copies/ml which, along with a modest 9/25/19 serum LDH of 257, was not very indicative of emergent PTLD. Viral load 1,023 on 10/8/2019.   - Multiple calcified left hilar nodes and splenic granulomas, which would indicate prior histoplasmosis based on [place].   - Rhinovirus shedding. 9/24/2019, 7/22/2019, 5/30/2019.   - Hx of port-related Pseudomonas bacteremia 7/21 (Hsieh removed, treated with 10 days of IV cefepime)  - QTc interval: 451 on 8/23/19 (pt has allergy to moxifloxacin)  - Pneumocystis prophylaxis: pentamidine 10/17/2019.  - Viral serostatus & prophylaxis: CMV+, EBV+, HSV2+ (seroreversion documented 5/16/2019); acyclovir   - Immunization status: needs flu shot when he is not febrile.  - Gamma globulin status: hypogammaglobulinemic. 507 on 9/25/2019. 920 on 10/16/2019.   - Isolation status: Good hand hygiene.    Erica Kelley MD, MD   Pager [phone]         Interval History:   Since Guille was last seen by ID on 10/17/2019, he has had a good reduction in his fever curve. Itraconazole was added 10/18/2019. He was transfused plt. Dry cough is still a prominent symptom; he does not want Benadryl pre-med before blood products as it makes him too  drowsy and he hasn't had a reaction to blood products. Axillary biopsy site looks fine per staff notes, dry and intact. He has concern that he might be discharged then need to be readmitted again, as happened within the last month, although he is aware that this time he is taking itraconazole.     Review of Systems:  CONSTITUTIONAL:  no fever today  EYES: negative for icterus or acute vision changes  ENT:  negative for acute hearing loss, tinnitus, sore throat  RESPIRATORY:  + dry cough, no sputum, + dyspnea  CARDIOVASCULAR:  negative for chest pain, palpitations  GASTROINTESTINAL:  negative for nausea, vomiting, diarrhea or constipation  GENITOURINARY:  negative for dysuria or hematuria  HEME:  No easy bruising or bleeding  INTEGUMENT:  negative for rash, but at night may have some pruritus  NEURO:  Negative for headache or tremor. He occ has dizziness when he is short of breath, such as climbing 2 flights of stairs.          Current Medications & Allergies:       acyclovir  800 mg Oral BID     albuterol  2.5 mg Nebulization Q28 Days     ceFEPIme (MAXIPIME) IV  2 g Intravenous Q8H     filgrastim-sndz (ZARXIO)  intravenous  5 mcg/kg Intravenous Once     itraconazole  200 mg Oral TID     PARoxetine  20 mg Oral QAM     pentamidine  300 mg Inhalation Q28 Days     sodium chloride (PF)  3 mL Intracatheter Q8H     Vitamin D3  2,000 Units Oral Daily       Allergies   Allergen Reactions     Chlorhexidine Itching     Avalide Hives     Chloroxylenol Rash     Technicare solution     Lorazepam Hives     Moxifloxacin Hives            Physical Exam:   Vitals were reviewed.  All vitals stable.  Patient Vitals for the past 24 hrs:   BP Temp Temp src Pulse Resp SpO2 Weight   10/21/19 1135 137/82 98.1  F (36.7  C) Axillary -- 16 97 % --   10/21/19 0800 134/85 98.2  F (36.8  C) Axillary -- 16 95 % 81.2 kg (179 lb)   10/21/19 0644 133/80 99.7  F (37.6  C) Axillary -- 16 93 % --   10/21/19 0519 120/71 99.8  F (37.7  C) Axillary -- 16 94  % --   10/21/19 0503 126/72 99.2  F (37.3  C) -- 93 16 -- --   10/21/19 0040 133/78 99.7  F (37.6  C) Axillary -- 16 94 % --   10/20/19 1959 135/83 98  F (36.7  C) Oral 82 18 93 % --   10/20/19 1736 138/83 99.6  F (37.6  C) Oral 94 18 95 % --   10/20/19 1644 (!) 148/82 100.9  F (38.3  C) Oral -- 18 93 % --   10/20/19 1622 (!) 150/83 100.2  F (37.9  C) Oral 101 20 95 % --     Ranges for vital signs:  Temp:  [98  F (36.7  C)-100.9  F (38.3  C)] 98.1  F (36.7  C)  Pulse:  [] 93  Heart Rate:  [] 85  Resp:  [16-20] 16  BP: (120-150)/(71-85) 137/82  SpO2:  [93 %-97 %] 97 %  Vitals:    10/19/19 0808 10/20/19 0845 10/21/19 0800   Weight: 80.6 kg (177 lb 9.6 oz) 80.7 kg (177 lb 14.4 oz) 81.2 kg (179 lb)       Physical Examination:  GENERAL:  well-developed, well-nourished man, in bed in no acute distress.  HEAD:  Head is normocephalic, atraumatic   EYES:  Eyes have anicteric sclerae   ENT:  Oropharynx is moist without exudates or ulcers. Tongue is midline  NECK:  Supple. No cervical lymphadenopathy  LUNGS:  Crackles in both lower lobes; upper lobes tend to clear with cough  CARDIOVASCULAR:  Regular rate and rhythm with no murmur  ABDOMEN:  Normal bowel sounds, soft, nontender.   SKIN:  No acute rashes. PIV in place without any surrounding erythema or exudate.  NEUROLOGIC:  Grossly nonfocal. Active x4 extremities         Laboratory Data:     Inflammatory Markers    Recent Labs   Lab Test 09/25/19  1022   .0*       Immune Globulin Studies     Recent Labs   Lab Test 10/16/19  0930 09/25/19  1018 08/23/19  1301 06/11/19  0918 05/06/19  0936 01/29/19  0810 01/14/19  0945    507* 824 572* 636* 826 864   IGM  --   --  25* 20* 26* 43* 50*   IGE  --   --   --   --  3  --  6   IGA  --   --  15* 41* 45* 311 327       Metabolic Studies       Recent Labs   Lab Test 10/21/19  0420 10/20/19  1900 10/20/19  1736 10/20/19  0359  10/17/19  1907  10/15/19  1635  09/24/19  1618     --   --  136   < >  --    < >   --    < >  --    POTASSIUM 3.5 3.3*  --  3.0*   < >  --    < >  --    < >  --    CHLORIDE 105  --   --  105   < >  --    < >  --    < >  --    CO2 24  --   --  23   < >  --    < >  --    < >  --    ANIONGAP 6  --   --  8   < >  --    < >  --    < >  --    BUN 12  --   --  16   < >  --    < >  --    < >  --    CR 0.69  --   --  0.79   < >  --    < >  --    < >  --    GFRESTIMATED >90  --   --  >90   < >  --    < >  --    < >  --    GLC 92  --   --  92   < >  --    < >  --    < >  --    ZHEN 7.7*  --   --  8.1*   < >  --    < >  --    < >  --    PHOS  --   --   --  3.2  --   --   --   --    < >  --    MAG  --   --   --  1.9  --   --   --   --    < >  --    LACT  --   --  1.7  --   --  2.1*  --   --    < >  --    PCAL  --   --   --   --   --   --   --  0.29  --   --    FGTL  --   --   --   --   --   --   --   --   --  <31    < > = values in this interval not displayed.       Hepatic Studies    Recent Labs   Lab Test 10/16/19  0930 10/15/19  1644 10/08/19  1301 10/04/19  0912  06/03/19  0305   BILITOTAL 0.8  --  0.6 0.8   < > 0.2   DBIL  --   --   --   --   --  <0.1   ALKPHOS 114  --  206* 288*   < > 55   PROTTOTAL 6.8  --  7.1 7.1   < > 5.7*   ALBUMIN 3.2*  --  3.5 3.3*   < > 2.6*   AST 13  --  21 20   < > 18   ALT 22  --  43 49   < > 23    206  --   --    < >  --     < > = values in this interval not displayed.       Gout Labs      Recent Labs   Lab Test 08/23/19  1301 05/16/19  1140 05/06/19  0936 01/23/19  0259 01/14/19  0945   URIC 4.2 4.6 4.6 4.4 4.2       Hematology Studies      Recent Labs   Lab Test 10/21/19  0420 10/20/19  0359 10/19/19  0347  10/18/19  0342 10/17/19  0413 10/16/19  0930   WBC 2.3* 2.1* 3.4*  --  3.6* 4.6 3.0*   ANEU 0.9* 1.3* 2.6  --  3.3 2.7 2.4   ALYM 0.9 0.4* 0.3*  --  0.3* 1.6 0.3*   NEGAR 0.0 0.1 0.2  --  0.0 0.1 0.2   AEOS 0.5 0.4 0.4  --  0.1 0.2 0.1   HGB 8.2* 7.6* 8.6*  --  9.2* 9.7* 7.6*   HCT 24.4* 23.4* 26.0*  --  27.7* 29.5* 22.7*   PLT 10* 13* 23*   < > 24* 14* 9*    < > =  values in this interval not displayed.       Clotting Studies    Recent Labs   Lab Test 10/16/19  1938 09/24/19  0149  07/22/19  0009 06/03/19  0305   INR 1.27* 1.19*  --  1.16* 1.10   PTT 43* 42*   < > 74*  --     < > = values in this interval not displayed.       Iron Testing    Recent Labs   Lab Test 10/21/19  0420   *       Urine Studies     Recent Labs   Lab Test 09/25/19  0822 09/23/19  1757 07/21/19 2013 05/30/19  2228 05/26/19  2104 05/06/19  0936   URINEPH 7.5* 6.0 6.0 6.0 6.0 6.0   NITRITE Negative Negative Negative Negative Negative Negative   LEUKEST Negative Negative Negative Negative Negative Negative   WBCU <1 1 1 1  --  1       Microbiology:  Fungal testing  Recent Labs   Lab Test 09/25/19 2011 09/24/19  1618 06/02/19  1045   FGTL  --  <31 <31   ASPGAI  --  0.05 0.04   ASPGAA  --  Negative Negative   ASPERGILLUSA <1:8  --   --    HISFUN <1:8  --   --    COFUNG <1:2  --   --        Last Culture results with specimen source  Culture Micro   Date Value Ref Range Status   10/20/2019 No growth after 11 hours  Preliminary   10/20/2019 No growth after 11 hours  Preliminary   10/20/2019 No growth after 11 hours  Preliminary   10/20/2019 No growth after 11 hours  Preliminary   10/19/2019 No growth after 2 days  Preliminary   10/19/2019 No growth after 2 days  Preliminary   10/18/2019 No growth after 3 days  Preliminary   10/18/2019 No growth after 3 days  Preliminary   10/17/2019 No VRE isolated  Final   10/17/2019 No growth after 4 days  Preliminary   10/17/2019 No growth after 4 days  Preliminary   10/16/2019 No growth after 5 days  Preliminary   10/15/2019 No growth  Final   09/28/2019 No growth  Final   09/28/2019 No growth  Final   09/27/2019 No growth  Final   09/26/2019 No growth  Final   09/26/2019 No growth   Final   09/25/2019 No growth  Final    Specimen Description   Date Value Ref Range Status   10/20/2019 Blood Right Hand  Final   10/20/2019 Blood Right Hand  Final   10/20/2019 Blood  Left Arm  Final   10/20/2019 Blood Left Arm  Final   10/19/2019 Blood Right Arm  Final   10/19/2019 Blood Left Arm  Final   10/18/2019 Blood Left Arm  Final   10/18/2019 Blood Left Arm  Final   10/17/2019 Feces  Final   10/17/2019 Blood Left Arm  Final   10/17/2019 Blood Left Arm  Final   10/16/2019 Blood Right Arm  Final   10/15/2019 Blood Unspecified Site  Final   09/28/2019 Blood Left Arm  Final   09/28/2019 Blood Left Arm  Final   09/27/2019 Blood Left Arm  Final   09/26/2019 Blood Left Arm  Final   09/26/2019 Blood Unspecified Site  Final   09/26/2019 Blood  Final        Last check of C difficile  C Diff Toxin B PCR   Date Value Ref Range Status   05/24/2019 Negative NEG^Negative Final     Comment:     Negative: Clostridium difficile target DNA sequences NOT detected, presumed   negative for Clostridium difficile toxin B or the number of bacteria present   may be below the limit of detection for the test.  FDA approved assay performed using [employer] GeneXpert real-time PCR.  A negative result does not exclude actual disease due to Clostridium difficile   and may be due to improper collection, handling and storage of the specimen   or the number of organisms in the specimen is below the detection limit of the   assay.         Syphilis Testing    Rapid Plasma Reagin   Date Value Ref Range Status   01/05/2005 Negative NEG Final       Quantiferon testing   Recent Labs   Lab Test 09/25/19 2011   TBRES Negative       Virology:  Log IU/mL of CMVQNT   Date Value Ref Range Status   10/16/2019 Not Calculated <2.1 [Log_IU]/mL Final   10/08/2019 Not Calculated <2.1 [Log_IU]/mL Final   09/25/2019 Not Calculated <2.1 [Log_IU]/mL Final   08/23/2019 Not Calculated <2.1 [Log_IU]/mL Final   08/06/2019 Not Calculated <2.1 [Log_IU]/mL Final   07/15/2019 Not Calculated <2.1 [Log_IU]/mL Final   07/08/2019 Not Calculated <2.1 [Log_IU]/mL Final   07/01/2019 Not Calculated <2.1 [Log_IU]/mL Final   06/24/2019 Not Calculated <2.1  [Log_IU]/mL Final   06/17/2019 Not Calculated <2.1 [Log_IU]/mL Final   06/07/2019 Not Calculated <2.1 [Log_IU]/mL Final   05/29/2019 Not Calculated <2.1 [Log_IU]/mL Final   05/17/2019 Not Calculated <2.1 [Log_IU]/mL Final       EBV DNA Copies/mL   Date Value Ref Range Status   10/16/2019 2,725 (A) EBVNEG^EBV DNA Not Detected [Copies]/mL Final   10/08/2019 1,203 (A) EBVNEG^EBV DNA Not Detected [Copies]/mL Final   09/25/2019 2,215 (A) EBVNEG^EBV DNA Not Detected [Copies]/mL Final       Imaging:  No results found for this or any previous visit (from the past 48 hour(s)).

## 2019-10-21 NOTE — PROGRESS NOTES
BMT Daily Progress Note     Mr. Guille Yanez is 59 y.o male with hx of Multiple Myeloma, s/p 2nd Autologous pbsct 5/2019. Readmitted for 2nd time in 1 month with fevers of unknown source.   Overnight events: Fever curve is improving - 100.9. Etiology of fevers if still unknown. Guille felt quite pretty good yesterday. He doesn't feel that good this morning but overall better. He is still fatigued, has less sob. He is concerned about on going fevers and low counts and worried if we won't get to the bottom of this.   Review of Systems: 10 point ROS negative except as noted above.    Physical Exam:   Blood pressure 137/82, pulse 93, temperature 98.1  F (36.7  C), temperature source Axillary, resp. rate 16, weight 81.2 kg (179 lb), SpO2 97 %.  General: NAD   Eyes:  FARRAH, sclera anicteric   Nose/Mouth/Throat: OP clear, buccal mucosa moist, no ulcerations   Lungs: CTA bilaterally  Cardiovascular: RRR  Abdominal/Rectal: +BS, soft, NT, ND, No HSM   Lymphatics:  No adenopathy appreciated  Skin: no rashes or petechaie  Neuro: A&O   Additional Findings: Hsieh site NT, no drainage.  Labs:  Lab Results   Component Value Date    WBC 2.3 (L) 10/21/2019    ANEU 0.9 (L) 10/21/2019    HGB 8.2 (L) 10/21/2019    HCT 24.4 (L) 10/21/2019    PLT 10 (LL) 10/21/2019     10/21/2019    POTASSIUM 3.5 10/21/2019    CHLORIDE 105 10/21/2019    CO2 24 10/21/2019    GLC 92 10/21/2019    BUN 12 10/21/2019    CR 0.69 10/21/2019    MAG 1.9 10/20/2019    INR 1.27 (H) 10/16/2019     Assessment and Plan:   Angel Yanez is a 59 year old male d+156 s/p autologous transplant for MM readmitted with 3 days of fevers. Recently admitted 9/23-9/30 for same thing.      1.  BMT/MM:   Slow engraftment; cell dose was only 0.639x10^6. ([facility] [place] - known poor cell dose as failed chemo-mobilization 1/2019.)   - Stringent CR.     2.  HEME: Keep Hgb>8g/dL, plt >10.   - GCSF as needed. Last on 10/8/19. Give gcsf again today, ANC 0.9  - Ongoing cytopenias -  low cell dose with transplant.       3.  ID: Persistant fevers. Not neutropenic, no localizing symptoms other than cough.  Tmax 100.9  - 10/16 repeat chest CT: persistent mediastinal adenopathy no paranchymal disease.     - S/P bx of L axillary node on 10/18, results still pending  -10/18 [place] DNA test- negative.     - Appreciate ID recs - all studies orders per their request. Bordello PCR neg, Immunity to Parvo. Still many tests pending (blasto,histo antigens, tularemia, brucella, letospira)   - Ordered ferritin and tryglycerides- does patient have HLH?   - Per ID start itraconazole 200mg TID for possible histoplasmosis.   - Blood cx NGTD 10/15    - Continue empiric cefepime.   - Hospital work up: 9/25 Crypto neg, parasite stain negative. Asp GM/fungitell neg, EBV <3000. RVP positive for rhinovirus- not cause of fevers given really no cold symptoms. CRP is elevated at 160. blood cultures negative.  UA unremarkable, and urine streptococcal and legionella antigens-negative.  - Continue prophylactic ACV  - PJP prophy pentamidine 10/17  - On CMV vaccine trial.   - CMV neg and EBV 1203 (10/8)     4. Pulm: Not hypoxic, SOB has improved.   - 10/17: Echo with preserved EF, no evidence of right heart strain  -10/17 VQ scan neg for PE    5. GI: no complaints.   - GI prophylaxis: omeprazole.    - Alk phos elevated likely to GCSF as improves when doesn't receive doses.      5.  FEN/Renal: HypoK.  Replace per SS     6.  Mood: Continue Paxil.      Remain hospitalized until Etiology of fevers understood  Myrna Marrufo PA-C  [phone]    Attending Summary  The patient was seen and examined by me separate from the midlevel provider.The note above reflects my assessment and plan. I have personally reviewed today's labs,vital and radiology results. The points of care that were added by me are:     History and physical  S/p 2nd ASCT for MM in 5/2019.  Admitted for recurrent fevers and severe fatigue and dyspnea. Decreasing fever  trend and improving respiratory status.    On exam:  Head/mouth/neck: Oropharynx clear.  No lymphadenopathy.  Heart: Regular rate and rhythm.  No murmurs rubs or gallops.  Lungs: Lungs are clear bilaterally.  Abdomen: Abdomen is soft nontender nondistended.  Intact bowel sounds.  Ext: No edema.  Skin: No rash.    Pertinent Labs:reviewed.    ASSESSMENT AND PLAN  1.  MM:  S/p 2nd ASCT  2.  Fevers:  CT chest shows scattered adenopathy.  EBV PCR low level viremia, will monitor closely.  Awaiting pathology results from left lymph node biopsy on 10/18. Clinically HD stable.  3.  Dyspnea:  VQ negative for PE.  TTE normal. Clinically improving respiratory status since adding itraconazole for empiric histoplasma coverage.      Dionne Casas MD

## 2019-10-21 NOTE — PLAN OF CARE
[id number]:  /71   Pulse 93   Temp 99.8  F (37.7  C) (Axillary)   Resp 16   Wt 80.7 kg (177 lb 14.4 oz)   SpO2 94%   BMI 24.95 kg/m      Pt afebrile over night. Denies any pain/N/V/D. Plts replaced- refused premeds. No other replacements this AM. Continue with POC.     Problem: Infection  Goal: Infection Symptom Resolution  Outcome: No Change     Problem: Infection Risk (Fever with Neutropenia)  Goal: Absence of Infection  Outcome: No Change

## 2019-10-21 NOTE — PLAN OF CARE
Patient with no complaints of pain or nausea, no temp spikes this shift. Awaiting results of lymph node biopsy from 10/18.

## 2019-10-22 LAB
ABO + RH BLD: NORMAL
ABO + RH BLD: NORMAL
ANION GAP SERPL CALCULATED.3IONS-SCNC: 9 MMOL/L (ref 3–14)
ANISOCYTOSIS BLD QL SMEAR: ABNORMAL
BACTERIA SPEC CULT: NO GROWTH
BASOPHILS # BLD AUTO: 0 10E9/L (ref 0–0.2)
BASOPHILS NFR BLD AUTO: 0 %
BLD GP AB SCN SERPL QL: NORMAL
BLD PROD TYP BPU: NORMAL
BLD UNIT ID BPU: 0
BLD UNIT ID BPU: 0
BLOOD BANK CMNT PATIENT-IMP: NORMAL
BLOOD PRODUCT CODE: NORMAL
BLOOD PRODUCT CODE: NORMAL
BPU ID: NORMAL
BPU ID: NORMAL
BRUCELLA AB TITR SER AGGL: NORMAL {TITER}
BUN SERPL-MCNC: 16 MG/DL (ref 7–30)
CALCIUM SERPL-MCNC: 8.5 MG/DL (ref 8.5–10.1)
CHLORIDE SERPL-SCNC: 106 MMOL/L (ref 94–109)
CO2 SERPL-SCNC: 25 MMOL/L (ref 20–32)
CREAT SERPL-MCNC: 0.62 MG/DL (ref 0.66–1.25)
DIFFERENTIAL METHOD BLD: ABNORMAL
EOSINOPHIL # BLD AUTO: 0.7 10E9/L (ref 0–0.7)
EOSINOPHIL NFR BLD AUTO: 20.5 %
ERYTHROCYTE [DISTWIDTH] IN BLOOD BY AUTOMATED COUNT: 19.9 % (ref 10–15)
FERRITIN SERPL-MCNC: 1715 NG/ML (ref 26–388)
GFR SERPL CREATININE-BSD FRML MDRD: >90 ML/MIN/{1.73_M2}
GLUCOSE SERPL-MCNC: 99 MG/DL (ref 70–99)
HCT VFR BLD AUTO: 22.9 % (ref 40–53)
HGB BLD-MCNC: 7.5 G/DL (ref 13.3–17.7)
LYMPHOCYTES # BLD AUTO: 0.7 10E9/L (ref 0.8–5.3)
LYMPHOCYTES NFR BLD AUTO: 18.8 %
Lab: NORMAL
MACROCYTES BLD QL SMEAR: PRESENT
MCH RBC QN AUTO: 34.2 PG (ref 26.5–33)
MCHC RBC AUTO-ENTMCNC: 32.8 G/DL (ref 31.5–36.5)
MCV RBC AUTO: 105 FL (ref 78–100)
MONOCYTES # BLD AUTO: 0.2 10E9/L (ref 0–1.3)
MONOCYTES NFR BLD AUTO: 5.4 %
NEUTROPHILS # BLD AUTO: 1.9 10E9/L (ref 1.6–8.3)
NEUTROPHILS NFR BLD AUTO: 55.3 %
NUM BPU REQUESTED: 1
NUM BPU REQUESTED: 2
PLATELET # BLD AUTO: 40 10E9/L (ref 150–450)
PLATELET # BLD EST: ABNORMAL 10*3/UL
POTASSIUM SERPL-SCNC: 3.2 MMOL/L (ref 3.4–5.3)
POTASSIUM SERPL-SCNC: 4 MMOL/L (ref 3.4–5.3)
RBC # BLD AUTO: 2.19 10E12/L (ref 4.4–5.9)
SODIUM SERPL-SCNC: 140 MMOL/L (ref 133–144)
SPECIMEN EXP DATE BLD: NORMAL
SPECIMEN SOURCE: NORMAL
TRANSFUSION STATUS PATIENT QL: NORMAL
TRIGL SERPL-MCNC: 186 MG/DL
WBC # BLD AUTO: 3.5 10E9/L (ref 4–11)

## 2019-10-22 PROCEDURE — P9037 PLATE PHERES LEUKOREDU IRRAD: HCPCS | Performed by: NURSE PRACTITIONER

## 2019-10-22 PROCEDURE — 30901 CONTROL OF NOSEBLEED: CPT | Performed by: INTERNAL MEDICINE

## 2019-10-22 PROCEDURE — 36415 COLL VENOUS BLD VENIPUNCTURE: CPT | Performed by: INTERNAL MEDICINE

## 2019-10-22 PROCEDURE — 86900 BLOOD TYPING SEROLOGIC ABO: CPT | Performed by: INTERNAL MEDICINE

## 2019-10-22 PROCEDURE — 82728 ASSAY OF FERRITIN: CPT | Performed by: INTERNAL MEDICINE

## 2019-10-22 PROCEDURE — P9040 RBC LEUKOREDUCED IRRADIATED: HCPCS | Performed by: INTERNAL MEDICINE

## 2019-10-22 PROCEDURE — 20600000 ZZH R&B BMT

## 2019-10-22 PROCEDURE — 80048 BASIC METABOLIC PNL TOTAL CA: CPT | Performed by: INTERNAL MEDICINE

## 2019-10-22 PROCEDURE — 85025 COMPLETE CBC W/AUTO DIFF WBC: CPT | Performed by: INTERNAL MEDICINE

## 2019-10-22 PROCEDURE — 25000132 ZZH RX MED GY IP 250 OP 250 PS 637: Performed by: NURSE PRACTITIONER

## 2019-10-22 PROCEDURE — 86923 COMPATIBILITY TEST ELECTRIC: CPT | Performed by: INTERNAL MEDICINE

## 2019-10-22 PROCEDURE — 36415 COLL VENOUS BLD VENIPUNCTURE: CPT | Performed by: NURSE PRACTITIONER

## 2019-10-22 PROCEDURE — 84478 ASSAY OF TRIGLYCERIDES: CPT | Performed by: INTERNAL MEDICINE

## 2019-10-22 PROCEDURE — 25000132 ZZH RX MED GY IP 250 OP 250 PS 637: Performed by: PHYSICIAN ASSISTANT

## 2019-10-22 PROCEDURE — 86901 BLOOD TYPING SEROLOGIC RH(D): CPT | Performed by: INTERNAL MEDICINE

## 2019-10-22 PROCEDURE — 84132 ASSAY OF SERUM POTASSIUM: CPT | Performed by: NURSE PRACTITIONER

## 2019-10-22 PROCEDURE — 25000128 H RX IP 250 OP 636: Performed by: NURSE PRACTITIONER

## 2019-10-22 PROCEDURE — 093K7ZZ CONTROL BLEEDING IN NASAL MUCOSA AND SOFT TISSUE, VIA NATURAL OR ARTIFICIAL OPENING: ICD-10-PCS | Performed by: INTERNAL MEDICINE

## 2019-10-22 PROCEDURE — 86850 RBC ANTIBODY SCREEN: CPT | Performed by: INTERNAL MEDICINE

## 2019-10-22 RX ADMIN — CEFPODOXIME PROXETIL 300 MG: 200 TABLET, FILM COATED ORAL at 20:02

## 2019-10-22 RX ADMIN — OXYMETAZOLINE HYDROCHLORIDE 2 SPRAY: 0.05 SPRAY NASAL at 07:54

## 2019-10-22 RX ADMIN — CEFEPIME HYDROCHLORIDE 2 G: 2 INJECTION, POWDER, FOR SOLUTION INTRAVENOUS at 03:35

## 2019-10-22 RX ADMIN — POTASSIUM CHLORIDE 40 MEQ: 750 TABLET, EXTENDED RELEASE ORAL at 07:54

## 2019-10-22 RX ADMIN — ACETAMINOPHEN 650 MG: 325 TABLET, FILM COATED ORAL at 00:28

## 2019-10-22 RX ADMIN — CEFPODOXIME PROXETIL 300 MG: 200 TABLET, FILM COATED ORAL at 12:32

## 2019-10-22 RX ADMIN — ACETAMINOPHEN 650 MG: 325 TABLET, FILM COATED ORAL at 06:12

## 2019-10-22 RX ADMIN — ACYCLOVIR 800 MG: 800 TABLET ORAL at 20:02

## 2019-10-22 RX ADMIN — ACYCLOVIR 800 MG: 800 TABLET ORAL at 07:54

## 2019-10-22 RX ADMIN — ITRACONAZOLE 200 MG: 100 CAPSULE ORAL at 07:54

## 2019-10-22 RX ADMIN — ITRACONAZOLE 200 MG: 100 CAPSULE ORAL at 15:08

## 2019-10-22 RX ADMIN — PAROXETINE HYDROCHLORIDE HEMIHYDRATE 20 MG: 20 TABLET, FILM COATED ORAL at 07:54

## 2019-10-22 RX ADMIN — MELATONIN 2000 UNITS: at 07:54

## 2019-10-22 RX ADMIN — POTASSIUM CHLORIDE 20 MEQ: 750 TABLET, EXTENDED RELEASE ORAL at 10:35

## 2019-10-22 RX ADMIN — ITRACONAZOLE 200 MG: 100 CAPSULE ORAL at 20:02

## 2019-10-22 ASSESSMENT — ACTIVITIES OF DAILY LIVING (ADL)
ADLS_ACUITY_SCORE: 10

## 2019-10-22 NOTE — PLAN OF CARE
"At 2330 patient having significant nosebleed from right nare. Blood in waste basket, blood soaking towel. Wadded kleenex soaked with fresh blood. Blood dripping from nose. He reported blood was running down back of throat, causing him to cough and gag. He had reported having blown nose forcefully earlier on PM shift. Gauze had been put into nose by PM nurse. Platelets immediately ordered stat from blood bank. Moonlighter notified and requested Afrin spray. Yankeur suction set up to remove blood and clots from mouth/airway.Afrin spray to right nare.  Cold pack and pressure applied to nose. Switched to nasal clamp to apply pressure to nares. Platelets transfused as soon as arrived to unit per peripheral IV. Given Tylenol as blood product med when patient able to swallow, at 0030.  Bleeding subsided slightly initially but increased again along with excessive clotting. Moonlighter notified again. He came to unit and inserted a RhinoRocket. Second bag of platelets transfused. Moonlighter indicated goal for platelets would be 30k overnight. Patient monitored for headache, which he denied. Hypertensive during night shift, but below HO parameters  Patient remained calm and cooperative. Urinal placed at bedside and patient instructed not to walk to bathroom. Bleeding subsided after nasal plug inserted. Mouth care done to remove blood and clots. Suctioned clots from mouth. Patient having difficulty sleeping, but understood nasal plug needed to remain in til morning. Finally dozing by 0330. Antibiotic coverage with Cefepime. AM labs collected by venipuncture. Platelet count after 2 bags of platelets was 40k. Hemoglobin 7.5, so transfused with one unit of PRBC's in the morning. No further bleeding from right nare, with RhinoRocket in place. Given Tylenol pre-PRBC\"s. Patient agreed to take KCL replacement orally with breakfast (food). Voiding large amounts of urine; drinking a lot of water; thirsty. Patient complaining Rhino " Rocket uncomfortable and wants it removed. Encouraged patient to wait until day shift so team can reevaluate it.  Remained afebrile rest of shift.

## 2019-10-22 NOTE — PROCEDURES
Procedure/Surgery Information   [facility]    Bedside Procedure Note  Date of Service (when I performed the procedure): 10/22/2019    Angel Yanez is a 59 year old male patient.  1. Multiple myeloma not having achieved remission (H)    2. Epistaxis  3. Thrombocytopenia    Past Medical History:   Diagnosis Date     GERD (gastroesophageal reflux disease)      H/O autologous stem cell transplant (H) 02/2005     Hyperlipidemia      Multiple myeloma (H) 2004     PONV (postoperative nausea and vomiting)      Psoriasis      Psoriatic arthritis (H)      Temp: 97.7  F (36.5  C)(second bag plts infusing) Temp src: Axillary BP: (!) 152/92 Pulse: 86 Heart Rate: 83 Resp: 18 SpO2: 94 % O2 Device: None (Room air)      [facility]    Epistaxis management  Date/Time: 10/22/2019 1:57 AM  Performed by: Edwardo Jones MD  Authorized by: Edwardo Jones MD     UNIVERSAL PROTOCOL   Site Marked: NA  Prior Images Obtained and Reviewed:  NA  Required items: Required blood products, implants, devices and special equipment available    Patient identity confirmed:  Verbally with patient  NA - No sedation, light sedation, or local anesthesia  Confirmation Checklist:  Patient's identity using two indicators  Time out: Immediately prior to the procedure a time out was called    ESBL (mL):  0        SEDATION    Patient Sedated: No    Treatment site: right anterior  Repair method: nasal balloon  Post-procedure assessment: bleeding stopped  Treatment complexity: complex    PROCEDURE Describe Procedure: No significant blood loss, facial pain, headache, or change in neurologic status reported by patient. He endorsed relief and resolution of pharyngeal irritation.   Time of Sedation in Minutes by Physician:  0         Edwardo Jones

## 2019-10-22 NOTE — PROGRESS NOTES
BMT Daily Progress Note     Mr. Guille Yanez is 59 y.o male with hx of Multiple Myeloma, s/p 2nd Autologous pbsct 5/2019. Readmitted for 2nd time in 1 month with fevers of unknown source.   Overnight events: Fever curve is improving - 100.9. Etiology of fevers if still unknown.Guille had a severe blood nose overnight- failed clamping, afrin, plts. Moonlighter placed nasal rocket which did stop bleeding however pt did have moderate amount of blood loss. He didn't sleep well at all due to nasal discomfort with rocket. He still had fever- but overall feeling better. He is anxious to figure out cause of fevers.     Physical Exam:   Blood pressure (!) 152/90, pulse 86, temperature 97.3  F (36.3  C), resp. rate 16, weight 78.8 kg (173 lb 12.8 oz), SpO2 96 %.  General: NAD   Eyes:  FARRAH, sclera anicteric   Nose/Mouth/Throat: OP clear, buccal mucosa moist, no ulcerations   Lungs: CTA bilaterally  Cardiovascular: RRR  Abdominal/Rectal: +BS, soft, NT, ND, No HSM   Lymphatics:  No adenopathy appreciated  Skin: no rashes or petechaie  Neuro: A&O   Additional Findings: Hsieh site NT, no drainage.  Labs:  Lab Results   Component Value Date    WBC 3.5 (L) 10/22/2019    ANEU 1.9 10/22/2019    HGB 7.5 (L) 10/22/2019    HCT 22.9 (L) 10/22/2019    PLT 40 (LL) 10/22/2019     10/22/2019    POTASSIUM 3.2 (L) 10/22/2019    CHLORIDE 106 10/22/2019    CO2 25 10/22/2019    GLC 99 10/22/2019    BUN 16 10/22/2019    CR 0.62 (L) 10/22/2019    MAG 1.9 10/20/2019    INR 1.27 (H) 10/16/2019     Assessment and Plan:   Angel Yanez is a 59 year old male d+156 s/p autologous transplant for MM readmitted with 3 days of fevers. Recently admitted 9/23-9/30 for same thing.      1.  BMT/MM:   Slow engraftment; cell dose was only 0.639x10^6. ([place] [place] - known poor cell dose as failed chemo-mobilization 1/2019.)   - Stringent CR.     2.  HEME: Keep Hgb>8g/dL, plt >10.   - GCSF as needed. Last on 10/8/19 and again 10/21- ANC improved 1.9   -  10/21 Significant epitaxis - Removed nasal rocket today. Increase plt parameter to >20k. PRn afrin. Encourage QID ocean spray to keep nares moist.   - Ongoing cytopenias - HLH? Vs low cell dose?       3.  ID: Persistant fevers. Not neutropenic, no localizing symptoms other than cough.  Tmax 100.7  - Stop cefepime per ID. Start vantin 200mg PO BID given intermittent neutropenia   - 10/16 repeat chest CT: persistent mediastinal adenopathy no paranchymal disease.     - S/P bx of L axillary node on 10/18, results still pending  -10/18 Kairus DNA test- negative.   - Question of fevers related to HLH- awaiting path results. Fevers, cytopenias, ferritin high?  - Appreciate ID recs - all studies orders per their request. Bordella PCR, tularemia, leptospira neg, Immunity to Parvo. Still many tests pending (blasto,histo antigens,brucella)   - Per ID start itraconazole 200mg TID for possible histoplasmosis.   - Blood cx NGTD 10/15    - Hospital work up: 9/25 Crypto neg, parasite stain negative. Asp GM/fungitell neg, EBV <3000. RVP positive for rhinovirus- not cause of fevers given really no cold symptoms. CRP is elevated at 160. blood cultures negative.  UA unremarkable, and urine streptococcal and legionella antigens-negative.  - Continue prophylactic ACV  - PJP prophy pentamidine 10/17  - On CMV vaccine trial.   - CMV neg and EBV 1203 (10/8)     4. Pulm: Not hypoxic, SOB has improved.   - 10/17: Echo with preserved EF, no evidence of right heart strain  -10/17 VQ scan neg for PE    5. GI: no complaints.   - GI prophylaxis: omeprazole.    - Alk phos elevated likely to GCSF as improves when doesn't receive doses.      5.  FEN/Renal: HypoK.  Replace per SS     6.  Mood: Continue Paxil.      Remain hospitalized until Etiology of fevers understood  JESICA BeltranC  [phone]    Attending Summary  The patient was seen and examined by me separate from the midlevel provider.The note above reflects my assessment and plan. I have  personally reviewed today's labs,vital and radiology results. The points of care that were added by me are:     History and physical  S/p 2nd ASCT for MM in 5/2019.  Admitted for recurrent fevers and severe fatigue and dyspnea. Decreasing fever trend and improving respiratory status.    On exam:  Head/mouth/neck: Oropharynx clear.  No lymphadenopathy.  Heart: Regular rate and rhythm.  No murmurs rubs or gallops.  Lungs: Lungs are clear bilaterally.  Abdomen: Abdomen is soft nontender nondistended.  Intact bowel sounds.  Ext: No edema.  Skin: No rash.    Pertinent Labs:  Lab Results   Component Value Date    WBC 3.5 10/22/2019     Lab Results   Component Value Date    RBC 2.19 10/22/2019     Lab Results   Component Value Date    HGB 7.5 10/22/2019     Lab Results   Component Value Date    HCT 22.9 10/22/2019     No components found for: MCT  Lab Results   Component Value Date     10/22/2019     Lab Results   Component Value Date    MCH 34.2 10/22/2019     Lab Results   Component Value Date    MCHC 32.8 10/22/2019     Lab Results   Component Value Date    RDW 19.9 10/22/2019     Lab Results   Component Value Date    PLT 40 10/22/2019     ASSESSMENT AND PLAN  1.  MM:  S/p 2nd ASCT  2.  Fevers:  CT chest shows scattered adenopathy.  EBV PCR low level viremia, will monitor closely.  Awaiting pathology results from left lymph node biopsy on 10/18. Clinically HD stable.  3.  Dyspnea:  VQ negative for PE.  TTE normal. Clinically improving respiratory status since adding itraconazole for empiric histoplasma coverage.      Anuel Lanier MD

## 2019-10-22 NOTE — PROGRESS NOTES
Brief GI Progress Note:  Discussed with primary team.   Ongoing workup for fevers of unknown origin.   At this point, axillary lymph node biopsy pending.  Team will unlikely want to pursue subcarinal biopsy even if axillary LN negative.  Current question of HLH, may proceed with BM biopsy.    Primary team will page GI should there be further interest in pursuing EUS / Biopsy.     GI will sign off. Please do not hesitate to page on-call GI fellow with any questions.     Maria Teresa Khan MD, [facility]  Gastroenterology Fellow, PGY4  Pager [phone]

## 2019-10-22 NOTE — PLAN OF CARE
Problem: Infection  Goal: Infection Symptom Resolution  10/21/2019 2017 by Johana Álvarez, RN  Outcome: Improving  Report of Chills x1 this christiano without temp spike-faint crackles lower bases. Harsh dry cough continues.      Problem: Adult Inpatient Plan of Care  Goal: Plan of Care Review  10/21/2019 2017 by Johana Álvarez, RN  Outcome: Improving  S-pt reports possible discharge in am. Cough continues- pt continues to be on iv antibiotic. Harsh cough with faint crackles of lungs - so sob or dyspnea at rest. Walked halls with not respiratory change observed. Vss. Offered cough suppressant -pt declined--states chewing gum helps.  B-pt continues infection treatment-bacterial and fungal coverage--viral covered empirically  A-pt with picture of resolving infection.  R-continue to monitor RR and lungs. Anticipate dishcarge-teaching sheet on itraconazole     2225-pt called for RN- nurse went in room to find pt with nose bleed--pt had blew his nose and a bleed started. Pt had  pack right nare with tissue-stated it started 10min before calling out . The time this rn went in he felt he stated the bleeding had  slowed down. Ice pack place on posterior neck. Pt in bed with HOB up 40 degrees. Pacing remains will gently remove to replace with gauze. If continues to bleed will order platelets. Ordered saline nose spray.

## 2019-10-22 NOTE — PLAN OF CARE
Pt spent most of the day fatigued and resting in bed after a long night struggling with a nose bleed.    The rhino rocket was removed this morning around 8 by provider; pt has not had any bleeding since.    Vital signs stable, no fevers.    Problem: Adult Inpatient Plan of Care  Goal: Plan of Care Review  Outcome: No Change

## 2019-10-23 ENCOUNTER — DOCUMENTATION ONLY (OUTPATIENT)
Dept: PHARMACY | Facility: CLINIC | Age: 59
End: 2019-10-23

## 2019-10-23 VITALS
BODY MASS INDEX: 24 KG/M2 | RESPIRATION RATE: 16 BRPM | OXYGEN SATURATION: 97 % | DIASTOLIC BLOOD PRESSURE: 93 MMHG | WEIGHT: 171.1 LBS | TEMPERATURE: 97.8 F | HEART RATE: 73 BPM | SYSTOLIC BLOOD PRESSURE: 126 MMHG

## 2019-10-23 LAB
ANION GAP SERPL CALCULATED.3IONS-SCNC: 5 MMOL/L (ref 3–14)
ANISOCYTOSIS BLD QL SMEAR: ABNORMAL
B HENSELAE DNA BLD QL NAA+PROBE: NOT DETECTED
BACTERIA SPEC CULT: NO GROWTH
BASOPHILS # BLD AUTO: 0 10E9/L (ref 0–0.2)
BASOPHILS NFR BLD AUTO: 0.9 %
BLD PROD TYP BPU: NORMAL
BLD PROD TYP BPU: NORMAL
BLD UNIT ID BPU: 0
BLOOD PRODUCT CODE: NORMAL
BPU ID: NORMAL
BUN SERPL-MCNC: 16 MG/DL (ref 7–30)
CALCIUM SERPL-MCNC: 8.6 MG/DL (ref 8.5–10.1)
CHLORIDE SERPL-SCNC: 108 MMOL/L (ref 94–109)
CO2 SERPL-SCNC: 27 MMOL/L (ref 20–32)
CREAT SERPL-MCNC: 0.74 MG/DL (ref 0.66–1.25)
DIFFERENTIAL METHOD BLD: ABNORMAL
EOSINOPHIL # BLD AUTO: 0.5 10E9/L (ref 0–0.7)
EOSINOPHIL NFR BLD AUTO: 16.2 %
ERYTHROCYTE [DISTWIDTH] IN BLOOD BY AUTOMATED COUNT: 18.9 % (ref 10–15)
GFR SERPL CREATININE-BSD FRML MDRD: >90 ML/MIN/{1.73_M2}
GLUCOSE SERPL-MCNC: 94 MG/DL (ref 70–99)
HCT VFR BLD AUTO: 27.1 % (ref 40–53)
HGB BLD-MCNC: 8.9 G/DL (ref 13.3–17.7)
LAB SCANNED RESULT: NORMAL
LAB SCANNED RESULT: NORMAL
LYMPHOCYTES # BLD AUTO: 0.8 10E9/L (ref 0.8–5.3)
LYMPHOCYTES NFR BLD AUTO: 27 %
Lab: NORMAL
MACROCYTES BLD QL SMEAR: PRESENT
MCH RBC QN AUTO: 34.4 PG (ref 26.5–33)
MCHC RBC AUTO-ENTMCNC: 32.8 G/DL (ref 31.5–36.5)
MCV RBC AUTO: 105 FL (ref 78–100)
MONOCYTES # BLD AUTO: 0.2 10E9/L (ref 0–1.3)
MONOCYTES NFR BLD AUTO: 6.3 %
NEUTROPHILS # BLD AUTO: 1.5 10E9/L (ref 1.6–8.3)
NEUTROPHILS NFR BLD AUTO: 49.6 %
NUM BPU REQUESTED: 1
PLATELET # BLD AUTO: 31 10E9/L (ref 150–450)
PLATELET # BLD EST: ABNORMAL 10*3/UL
POTASSIUM SERPL-SCNC: 4 MMOL/L (ref 3.4–5.3)
RBC # BLD AUTO: 2.59 10E12/L (ref 4.4–5.9)
SODIUM SERPL-SCNC: 141 MMOL/L (ref 133–144)
SPECIMEN SOURCE: NORMAL
SPECIMEN SOURCE: NORMAL
TRANSFUSION STATUS PATIENT QL: NORMAL
TRANSFUSION STATUS PATIENT QL: NORMAL
WBC # BLD AUTO: 3.1 10E9/L (ref 4–11)

## 2019-10-23 PROCEDURE — 25000132 ZZH RX MED GY IP 250 OP 250 PS 637: Performed by: PHYSICIAN ASSISTANT

## 2019-10-23 PROCEDURE — 80048 BASIC METABOLIC PNL TOTAL CA: CPT | Performed by: INTERNAL MEDICINE

## 2019-10-23 PROCEDURE — 36415 COLL VENOUS BLD VENIPUNCTURE: CPT | Performed by: INTERNAL MEDICINE

## 2019-10-23 PROCEDURE — 85025 COMPLETE CBC W/AUTO DIFF WBC: CPT | Performed by: INTERNAL MEDICINE

## 2019-10-23 PROCEDURE — P9037 PLATE PHERES LEUKOREDU IRRAD: HCPCS | Performed by: PHYSICIAN ASSISTANT

## 2019-10-23 PROCEDURE — 25000132 ZZH RX MED GY IP 250 OP 250 PS 637: Performed by: NURSE PRACTITIONER

## 2019-10-23 RX ORDER — ITRACONAZOLE 100 MG/1
200 CAPSULE ORAL 2 TIMES DAILY
Qty: 120 CAPSULE | Refills: 0 | Status: SHIPPED | OUTPATIENT
Start: 2019-10-23 | End: 2019-11-26

## 2019-10-23 RX ADMIN — CEFPODOXIME PROXETIL 300 MG: 200 TABLET, FILM COATED ORAL at 07:40

## 2019-10-23 RX ADMIN — PAROXETINE HYDROCHLORIDE HEMIHYDRATE 20 MG: 20 TABLET, FILM COATED ORAL at 07:39

## 2019-10-23 RX ADMIN — MELATONIN 2000 UNITS: at 07:39

## 2019-10-23 RX ADMIN — ITRACONAZOLE 200 MG: 100 CAPSULE ORAL at 13:41

## 2019-10-23 RX ADMIN — ITRACONAZOLE 200 MG: 100 CAPSULE ORAL at 07:39

## 2019-10-23 RX ADMIN — ACYCLOVIR 800 MG: 800 TABLET ORAL at 07:39

## 2019-10-23 ASSESSMENT — ACTIVITIES OF DAILY LIVING (ADL)
ADLS_ACUITY_SCORE: 10

## 2019-10-23 NOTE — DISCHARGE SUMMARY
[facility] Discharge Summary   Angel Yanez MRN# [id number]   Age: 59 year old  YOB: 1960   Date of Admission: 10/16/2019  Date of Discharge:  10/23/19  Admitting Physician: Anuel Lanier MD  Discharge Physician:  Anuel Lanier MD  Discharge Diagnoses:    1.) Suspected Histoplasmosis   2.) Fevers of unknown source- resolved  3.) Pancytopenia secondary to #1 and #4 below  4.) S/p Autologous PBSCT for MM  5.) Epistaxis   Discharge Medications:       Guille Yanez   Home Medication Instructions VAIBHAV:[id number]    Printed on:10/23/19 1116   Medication Information                      acetaminophen (TYLENOL) 325 MG tablet  Take 1,000 mg by mouth every 6 hours as needed for mild pain             acyclovir (ZOVIRAX) 800 MG tablet  Take 1 tablet (800 mg) by mouth 2 times daily             calcium carbonate (CALCIUM CARBONATE) 600 MG tablet  Take 2 tablets by mouth daily              Cholecalciferol (VITAMIN D3) 2000 units CAPS  Take 2,000 Units by mouth daily              itraconazole (SPORANOX) 100 MG capsule  Take 2 capsules (200 mg) by mouth 2 times daily             PARoxetine (PAXIL) 20 MG tablet  Take 20 mg by mouth every morning             pentamidine (NEBUPENT) 300 MG neb solution  Inhale 300 mg into the lungs every 28 days Will get in BMT clinic. Next due 10/17/19               Brief History of Illness:    **Adopted from H&P  Tired. Fevers continue. Responsive to tylenol. Nothing localizing other then vomited x 2. Not currently nauseated. No diarrhea. He's not sure what that was about. Frustrated about prolonged course.   Physical Exam:    BP (!) 142/92 (BP Location: Right arm)   Pulse 73   Temp 96.6  F (35.9  C) (Axillary)   Resp 16   Wt 77.6 kg (171 lb 1.6 oz)   SpO2 96%   BMI 24.00 kg/m    General Appearance: well appearing, NAD.   HEENT: sclera anicteric, EOMI. Moist mucus membranes, no ulcerations or thrush.   CV: RRR, no murmur or rub.   RESP: CTA bilaterally; no rales or  wheezes.  GI: +BS, soft, nontender, no masses or hepatosplenomegaly.  EXT: no edema marysol LE's  SKIN:  No rash or lesions on exposed areas.  NEURO: A&O x3; CN II-XII grossly intact.  PSYCH: Appropriate affect  VASCULAR ACCESS:   Hospital Course:    Guille was admitted with neutropenic fevers with negative blood ucltures and faiiled to improved with 24 hours of IV rocephin outpatient. He again really had no focal symptoms aside form long standing cough. ON admission he was highly febrile and did appear to have increased work of breathing with that ruled out cardiac strain, PE and repeat chest ct- again no parenchymal pulmonary infection however persistent chest adenopathy. Consulted IR for mediastinal LN bx- they felt transbronchial or Ebus by GI would be better suited to get tissue- Interventional pulmonary and GI consulted- they both felt safest approach given thrombocytopenia would be IR guided axillary lymph node bx. ID was also consulted and large ID work up undertook. Given his persistent fever despite broad spectrum abx previously she recommended starting itraconazole for possible histoplasmosis. Since start itraconazole fever curve as trended down. Final path report consistent with reactive lymphadenopathy- no sign of secondary malignancy, HLH.  HIsto urine antigen was negative but this can be suppressed by fluconazole. Plan to continue itaconazole 3-6 months, f/u with Dr Kelley in 4-6 weeks OP.     See daily progress note for on going Post transplant care.   CODE STATUS: Full Code  Discharge Instructions and Follow-Up:    Discharge diet: Regular diet as tolerated  Discharge activity: Activity as tolerated   Discharge follow-up: Follow up with BMT Clinic as follows: 10/25/19 :10:45 labs and 11:30 olimpia     Discharge Disposition:    Discharged to home.      Myrna Marrufo PA-C  [phone]    Attending Summary  I saw and examined the patient and agree with the details noted above.  Total time in coordinating discharge  plan was <30 minutes.

## 2019-10-23 NOTE — PLAN OF CARE
Pt had quiet day with stable vital signs and no nose bleeds.    He was given 1 unit of platelets.    Pt discharged in stable condition to home with his wife.    Pt's questions were answered prior to discharge.    Problem: Adult Inpatient Plan of Care  Goal: Plan of Care Review  Outcome: Adequate for Discharge

## 2019-10-23 NOTE — PROGRESS NOTES
BMT Daily Progress Note     Mr. Guille Yanez is 59 y.o male with hx of Multiple Myeloma, s/p 2nd Autologous pbsct 5/2019. Readmitted for 2nd time in 1 month with fevers of unknown source.   Overnight events: Afebrile overnight. No further nose bleeding. He overall feels much better, he is ready for hospital discharge and hopeful we can figured out the issue so he doesn't have recurrent fevers again.     Physical Exam:   Blood pressure 138/86, pulse 73, temperature 97.9  F (36.6  C), temperature source Axillary, resp. rate 18, weight 77.6 kg (171 lb 1.6 oz), SpO2 98 %.  General: NAD   Eyes:  FARRAH, sclera anicteric   Nose/Mouth/Throat: OP clear, buccal mucosa moist, no ulcerations   Lungs: CTA bilaterally  Cardiovascular: RRR  Abdominal/Rectal: +BS, soft, NT, ND, No HSM   Lymphatics: no edema.   Skin: New rash on back - macular, asymptomatic (vantin?)   Neuro: A&O   Additional Findings: Hsieh site NT, no drainage.  Labs:  Lab Results   Component Value Date    WBC 3.1 (L) 10/23/2019    ANEU 1.5 (L) 10/23/2019    HGB 8.9 (L) 10/23/2019    HCT 27.1 (L) 10/23/2019    PLT 31 (LL) 10/23/2019     10/23/2019    POTASSIUM 4.0 10/23/2019    CHLORIDE 108 10/23/2019    CO2 27 10/23/2019    GLC 94 10/23/2019    BUN 16 10/23/2019    CR 0.74 10/23/2019    MAG 1.9 10/20/2019    INR 1.27 (H) 10/16/2019     Assessment and Plan:   Angel Yanez is a 59 year old male d+156 s/p autologous transplant for MM readmitted with 3 days of fevers. Recently admitted 9/23-9/30 for same thing.      1.  BMT/MM:   Slow engraftment; cell dose was only 0.639x10^6. ([facility] [place] - known poor cell dose as failed chemo-mobilization 1/2019.)   - Stringent CR.     2.  HEME: Keep Hgb>8g/dL, plt >20 (epitaxis requiring rhinorocket packing).   - GCSF as needed. Last on 10/8/19 and again 10/21- ANC improved 1.5 Plan to f/u counts in BMT clinic 10/25   - Hopeful counts will improve with treatment of underlying infection.   - No evidence of HLH on  lymphnode bx.    - Encourage QID ocean spray to keep nares moist.         3.  ID: Afebrile last 24 hours  - New rash- question if related to vantin. STop vantin as antibiotics have not helped fevers to date. Will not resume prophy azithro but restart if needs gcsf again OP.    - 10/16 repeat chest CT: persistent mediastinal adenopathy no paranchymal disease.     - S/P bx of L axillary node on 10/18: No evidence of HLH, tissues looks reactive.  - Given that fevers have markedly improved with addition of itraconazole 200 TID - decrease to 200mg PO BID- Per Dr Kelley will have him f/u ~12/19. Likely 3-6 months of itraconazole  - 10/18 Histo/blasto urine antigen negative. Per Dr Kelley flucaonzole can suppress growth?   -10/18 Kairus DNA test- negative.   - Large infectious work up negative.   - Continue prophylactic ACV  - PJP prophy pentamidine 10/17  - On CMV vaccine trial.   - CMV neg and EBV 1203 (10/8)     4. Pulm: Not hypoxic, SOB has improved.   - 10/17: Echo with preserved EF, no evidence of right heart strain  -10/17 VQ scan neg for PE    5. GI: no complaints.   - GI prophylaxis: omeprazole.    - Alk phos elevated likely to GCSF as improves when doesn't receive doses.      5.  FEN/Renal: HypoK.  Replace per SS     6.  Mood: Continue Paxil.     Discharge to home today. F/u in bmt clinic 10/25/19   JESICA BeltranC  [phone]    Attending Summary  The patient was seen and examined by me separate from the midlevel provider.The note above reflects my assessment and plan. I have personally reviewed today's labs,vital and radiology results. The points of care that were added by me are:     History and physical  S/p 2nd ASCT for MM in 5/2019.  Admitted for recurrent fevers and severe fatigue and dyspnea. Decreasing fever trend and improving respiratory status.    On exam:  Head/mouth/neck: Oropharynx clear.  No lymphadenopathy.  Heart: Regular rate and rhythm.  No murmurs rubs or gallops.  Lungs: Lungs are clear  bilaterally.  Abdomen: Abdomen is soft nontender nondistended.  Intact bowel sounds.  Ext: No edema.  Skin: No rash.    Pertinent Labs:  Lab Results   Component Value Date    WBC 3.1 10/23/2019     Lab Results   Component Value Date    RBC 2.59 10/23/2019     Lab Results   Component Value Date    HGB 8.9 10/23/2019     Lab Results   Component Value Date    HCT 27.1 10/23/2019     No components found for: MCT  Lab Results   Component Value Date     10/23/2019     Lab Results   Component Value Date    MCH 34.4 10/23/2019     Lab Results   Component Value Date    MCHC 32.8 10/23/2019     Lab Results   Component Value Date    RDW 18.9 10/23/2019     Lab Results   Component Value Date    PLT 31 10/23/2019     ASSESSMENT AND PLAN  1.  MM:  S/p 2nd ASCT  2.  Fevers:  CT chest shows scattered adenopathy.  EBV PCR low level viremia, will monitor closely.  Pathology results from left lymph node biopsy is consistent with a rective node.  Will continue empiric itraconazole.  Follow up scheduled.  3.  Dyspnea:  VQ negative for PE.  TTE normal. Clinically improving respiratory status since adding itraconazole for empiric histoplasma coverage.      Anuel Lanier MD

## 2019-10-23 NOTE — PLAN OF CARE
Blood pressure 138/80, pulse 73, temperature 97.6  F (36.4  C), temperature source Axillary, resp. rate 16, weight 78.8 kg (173 lb 12.8 oz), SpO2 96 %.  Pt had uneventful shift. He slept most part of the night except when up to use the bathroom. Peripheral IV NS locked. No nose bleed noted/reported.Hgb and plt counts are WNL for pt. Continue to monitor pt and continue with plan of care  Problem: Infection  Goal: Infection Symptom Resolution  Outcome: No Change     Problem: Adult Inpatient Plan of Care  Goal: Optimal Comfort and Wellbeing  Outcome: No Change     Problem: Adult Inpatient Plan of Care  Goal: Absence of Hospital-Acquired Illness or Injury  Outcome: No Change

## 2019-10-23 NOTE — PLAN OF CARE
Just noticed new order to keep pt NPO and to give plt this morning for a procedure. Pt wa notified of NPO status and plt ordered.

## 2019-10-23 NOTE — SUMMARY OF CARE
BMT Summary of Care    October 23, 2019 11:06 AM  Angel Yanez  MRN: [id number]    Discharge Date: 10/23/19    BMT Primary Physician: Dr Lucio    BMT Nurse Coordinator: Su Pate    Discharge Diagnosis: S/P readmission - suspected histoplasmosis     Discharge To: HOme    Activity: as toelrated    Catheter Care: None    IV Medications through home infusion: None    Nutrition: Regular diet as tolerated    Blood Transfusions:  Transfuse if Hemoglobin < or equal 8.0 g/dL  Red Blood Cell Order: 2 units, irradiated and leukoreduced   Transfuse if Platelet count < or equal 20 uL  Platelet order: 1 adult dose, irradiated and leukoreduced  Transfusion Pre-meds:  Tylenol 650 mg PO x 1 pre-transfusion     Intravenous Electrolyte Replacement:  Potassium  chloride (give only if serum creatinine < 2)     3-3.3  20mEq/hr  over 1 hour x 2 doses     <3      20mEq/hr  over 1 hour x 3 doses  Magnesium sulfate 1.3-1.7     2 grams over 1 hour x1 dose                                     <1.3         2 grams over 2 hours x1 dose    Outpatient Pharmacy:  G-CSF to be given in clinic: (dose)  5mcg/kg gcsf prn to keep ANC >1000.     Laboratory Tests:  At next clinic appointment (date: 10/25/19)  Hemogram (CBC) differential, platelet count  Comprehensive Metabolic Panel    Support Services:  None    Appointments:   BMT Clinic (date, time, provider): 10/25/19 12:45 for 1:30pm  Follow up in BMT clinic  - Requested f/u with Dr Kelley in infectious disease follow up in 4-6 weeks.

## 2019-10-23 NOTE — SUMMARY OF CARE
Guille Yanez   Home Medication Instructions VAIBHAV:[id number]    Printed on:10/23/19 1101   Medication Information                      acetaminophen (TYLENOL) 325 MG tablet  Take 1,000 mg by mouth every 6 hours as needed for mild pain             acyclovir (ZOVIRAX) 800 MG tablet  Take 1 tablet (800 mg) by mouth 2 times daily             calcium carbonate (CALCIUM CARBONATE) 600 MG tablet  Take 2 tablets by mouth daily              Cholecalciferol (VITAMIN D3) 2000 units CAPS  Take 2,000 Units by mouth daily              itraconazole (SPORANOX) 100 MG capsule  Take 2 capsules (200 mg) by mouth 2 times daily             PARoxetine (PAXIL) 20 MG tablet  Take 20 mg by mouth every morning             pentamidine (NEBUPENT) 300 MG neb solution  Inhale 300 mg into the lungs every 28 days Will get in BMT clinic. Next due 10/17/19

## 2019-10-24 LAB
ABO + RH BLD: NORMAL
ABO + RH BLD: NORMAL
BACTERIA SPEC CULT: NO GROWTH
BLD GP AB SCN SERPL QL: NORMAL
BLD PROD TYP BPU: NORMAL
BLD PROD TYP BPU: NORMAL
BLOOD BANK CMNT PATIENT-IMP: NORMAL
COPATH REPORT: NORMAL
Lab: NORMAL
NUM BPU REQUESTED: 1
NUM BPU REQUESTED: 2
SPECIMEN EXP DATE BLD: NORMAL
SPECIMEN SOURCE: NORMAL

## 2019-10-24 NOTE — PROGRESS NOTES
Prior Authorization Approval    itraconazole 100mg caps  Date Initiated: 10/23/2019  Date Completed: 10/23/2019  Prior Auth Type: Clinical                Status: Approved    Effective Date: 09/23/2019 - 10/20/2020  Copay: 0.00     Filling Pharmacy: [facility] [place], [place] - [address] [place] [address]    Insurance: [employer] - Phone [phone] Fax [phone]  ID: [id number]  Case Number: [id number]   Submitted Via: Dulce Jenkins  [facility] Liaison  Ph: [phone] Page: [phone]

## 2019-10-25 ENCOUNTER — APPOINTMENT (OUTPATIENT)
Dept: LAB | Facility: CLINIC | Age: 59
End: 2019-10-25
Attending: PHYSICIAN ASSISTANT
Payer: COMMERCIAL

## 2019-10-25 ENCOUNTER — INFUSION THERAPY VISIT (OUTPATIENT)
Dept: TRANSPLANT | Facility: CLINIC | Age: 59
End: 2019-10-25
Attending: PHYSICIAN ASSISTANT
Payer: COMMERCIAL

## 2019-10-25 VITALS
TEMPERATURE: 97.9 F | DIASTOLIC BLOOD PRESSURE: 80 MMHG | OXYGEN SATURATION: 98 % | HEART RATE: 85 BPM | WEIGHT: 169 LBS | SYSTOLIC BLOOD PRESSURE: 142 MMHG | BODY MASS INDEX: 23.7 KG/M2 | RESPIRATION RATE: 16 BRPM

## 2019-10-25 DIAGNOSIS — L40.50 PSORIASIS WITH ARTHROPATHY (H): Primary | ICD-10-CM

## 2019-10-25 DIAGNOSIS — C90.00 MULTIPLE MYELOMA NOT HAVING ACHIEVED REMISSION (H): Primary | ICD-10-CM

## 2019-10-25 DIAGNOSIS — L40.50 PSORIASIS WITH ARTHROPATHY (H): ICD-10-CM

## 2019-10-25 LAB
ALBUMIN SERPL-MCNC: 3.2 G/DL (ref 3.4–5)
ALP SERPL-CCNC: 207 U/L (ref 40–150)
ALT SERPL W P-5'-P-CCNC: 36 U/L (ref 0–70)
ANION GAP SERPL CALCULATED.3IONS-SCNC: 6 MMOL/L (ref 3–14)
ANISOCYTOSIS BLD QL SMEAR: SLIGHT
AST SERPL W P-5'-P-CCNC: 16 U/L (ref 0–45)
BACTERIA SPEC CULT: NO GROWTH
BACTERIA SPEC CULT: NO GROWTH
BASOPHILS # BLD AUTO: 0 10E9/L (ref 0–0.2)
BASOPHILS NFR BLD AUTO: 0 %
BILIRUB SERPL-MCNC: 0.5 MG/DL (ref 0.2–1.3)
BLD PROD TYP BPU: NORMAL
BLD UNIT ID BPU: 0
BLOOD PRODUCT CODE: NORMAL
BPU ID: NORMAL
BUN SERPL-MCNC: 14 MG/DL (ref 7–30)
CALCIUM SERPL-MCNC: 8.7 MG/DL (ref 8.5–10.1)
CHLORIDE SERPL-SCNC: 108 MMOL/L (ref 94–109)
CO2 SERPL-SCNC: 30 MMOL/L (ref 20–32)
CREAT SERPL-MCNC: 0.67 MG/DL (ref 0.66–1.25)
DIFFERENTIAL METHOD BLD: ABNORMAL
EOSINOPHIL # BLD AUTO: 0.2 10E9/L (ref 0–0.7)
EOSINOPHIL NFR BLD AUTO: 9 %
ERYTHROCYTE [DISTWIDTH] IN BLOOD BY AUTOMATED COUNT: 17.3 % (ref 10–15)
GFR SERPL CREATININE-BSD FRML MDRD: >90 ML/MIN/{1.73_M2}
GLUCOSE SERPL-MCNC: 94 MG/DL (ref 70–99)
HCT VFR BLD AUTO: 29.9 % (ref 40–53)
HGB BLD-MCNC: 9.7 G/DL (ref 13.3–17.7)
LYMPHOCYTES # BLD AUTO: 1.6 10E9/L (ref 0.8–5.3)
LYMPHOCYTES NFR BLD AUTO: 59 %
Lab: NORMAL
Lab: NORMAL
MACROCYTES BLD QL SMEAR: PRESENT
MCH RBC QN AUTO: 34 PG (ref 26.5–33)
MCHC RBC AUTO-ENTMCNC: 32.4 G/DL (ref 31.5–36.5)
MCV RBC AUTO: 105 FL (ref 78–100)
MONOCYTES # BLD AUTO: 0.3 10E9/L (ref 0–1.3)
MONOCYTES NFR BLD AUTO: 11 %
NEUTROPHILS # BLD AUTO: 0.6 10E9/L (ref 1.6–8.3)
NEUTROPHILS NFR BLD AUTO: 21 %
PLATELET # BLD AUTO: 22 10E9/L (ref 150–450)
PLATELET # BLD EST: ABNORMAL 10*3/UL
POTASSIUM SERPL-SCNC: 3.9 MMOL/L (ref 3.4–5.3)
PROT SERPL-MCNC: 6.7 G/DL (ref 6.8–8.8)
RBC # BLD AUTO: 2.85 10E12/L (ref 4.4–5.9)
SODIUM SERPL-SCNC: 144 MMOL/L (ref 133–144)
SPECIMEN SOURCE: NORMAL
SPECIMEN SOURCE: NORMAL
TRANSFUSION STATUS PATIENT QL: NORMAL
WBC # BLD AUTO: 2.7 10E9/L (ref 4–11)

## 2019-10-25 PROCEDURE — 80053 COMPREHEN METABOLIC PANEL: CPT | Performed by: PHYSICIAN ASSISTANT

## 2019-10-25 PROCEDURE — 36415 COLL VENOUS BLD VENIPUNCTURE: CPT

## 2019-10-25 PROCEDURE — G0463 HOSPITAL OUTPT CLINIC VISIT: HCPCS | Mod: 25

## 2019-10-25 PROCEDURE — 85025 COMPLETE CBC W/AUTO DIFF WBC: CPT | Performed by: PHYSICIAN ASSISTANT

## 2019-10-25 PROCEDURE — 25000128 H RX IP 250 OP 636: Mod: ZF | Performed by: STUDENT IN AN ORGANIZED HEALTH CARE EDUCATION/TRAINING PROGRAM

## 2019-10-25 PROCEDURE — 96372 THER/PROPH/DIAG INJ SC/IM: CPT

## 2019-10-25 PROCEDURE — 40000268 ZZH STATISTIC NO CHARGES: Mod: ZF

## 2019-10-25 RX ORDER — HEPARIN SODIUM,PORCINE 10 UNIT/ML
5 VIAL (ML) INTRAVENOUS
Status: CANCELLED | OUTPATIENT
Start: 2019-11-15

## 2019-10-25 RX ORDER — PENTAMIDINE ISETHIONATE 300 MG/300MG
300 INHALANT RESPIRATORY (INHALATION)
Status: CANCELLED
Start: 2019-11-15

## 2019-10-25 RX ORDER — ALBUTEROL SULFATE 5 MG/ML
2.5 SOLUTION RESPIRATORY (INHALATION)
Status: CANCELLED
Start: 2019-11-15

## 2019-10-25 RX ADMIN — FILGRASTIM 480 MCG: 480 INJECTION, SOLUTION INTRAVENOUS; SUBCUTANEOUS at 13:55

## 2019-10-25 ASSESSMENT — PAIN SCALES - GENERAL: PAINLEVEL: NO PAIN (0)

## 2019-10-25 NOTE — PROGRESS NOTES
BMT Daily Progress Note      Mr. Guille Yanez is 59 y.o male with hx of Multiple Myeloma, s/p 2nd Autologous pbsct 5/2019. Readmitted for 2nd time in 1 month with fevers of unknown source.     HPI: No fevers. No new issues. NO bleeding.      Physical Exam:   BP (!) 142/80   Pulse 85   Temp 97.9  F (36.6  C)   Resp 16   Wt 76.7 kg (169 lb)   SpO2 98%   BMI 23.70 kg/m    General: NAD   Eyes:  FARRAH, sclera anicteric   Lymphatics: no edema.   Skin: no rash on exposed skin  Neuro: A&O. Normal gait.  Additional Findings: Hsieh site NT, no drainage.     Assessment and Plan:   Angel Yanez is a 59 year old male d+161 s/p autologous transplant for MM readmitted with 3 days of fevers. Recently admitted 9/23-9/30 for same thing.      1.  BMT/MM:   Slow engraftment; cell dose was only 0.639x10^6. ([facility] [place] - known poor cell dose as failed chemo-mobilization 1/2019.)   - Stringent CR.     2.  HEME: Keep Hgb>8g/dL, plt >10-20 (epitaxis requiring rhinorocket packing). No bleeding didn't give plts today.  - GCSF today.   - No evidence of HLH on lymphnode bx.    - Encourage QID ocean spray to keep nares moist.          3.  ID:   - New rash- question if related to vantin. STop vantin as antibiotics have not helped fevers to date. Will not resume prophy azithro but restart pending what counts do.  - 10/16 repeat chest CT: persistent mediastinal adenopathy no paranchymal disease.     - S/P bx of L axillary node on 10/18: No evidence of HLH, tissues looks reactive.  - Given that fevers have markedly improved with addition of itraconazole 200 TID - decrease to 200mg PO BID- Per Dr Kelley will have him f/u ~12/19. Likely 3-6 months of itraconazole  - 10/18 Histo/blasto urine antigen negative. Per Dr Kelley flucaonzole can suppress growth?   -10/18 Kairus DNA test- negative.   - Large infectious work up negative.   - Continue prophylactic ACV  - PJP prophy pentamidine 10/17  - On CMV vaccine trial.   - CMV neg and EBV 1203  (10/8)     4. Pulm:   - 10/17: Echo with preserved EF, no evidence of right heart strain  -10/17 VQ scan neg for PE     5. GI: no complaints.   - GI prophylaxis: omeprazole.    - Alk phos elevated likely to GCSF as improves when doesn't receive doses.      5.  FEN/Renal: HypoK.  Replace per SS      6.  Mood: Continue Paxil.     RTC: Tuesday possible g and plts/rbcs.    Cassie Ferrari PA-C  #[id number]

## 2019-10-25 NOTE — NURSING NOTE
"Oncology Rooming Note    October 25, 2019 1:13 PM   Angel Yanez is a 59 year old male who presents for:    Chief Complaint   Patient presents with     Lab Only     venipuncture, vitals checked     RECHECK     Return: MM      Initial Vitals: BP (!) 142/80   Pulse 85   Temp 97.9  F (36.6  C)   Resp 16   Wt 76.7 kg (169 lb)   SpO2 98%   BMI 23.70 kg/m   Estimated body mass index is 23.7 kg/m  as calculated from the following:    Height as of 9/24/19: 1.798 m (5' 10.8\").    Weight as of this encounter: 76.7 kg (169 lb). Body surface area is 1.96 meters squared.  No Pain (0) Comment: Data Unavailable   No LMP for male patient.  Allergies reviewed: Yes  Medications reviewed: Yes    Medications: Medication refills not needed today.  Pharmacy name entered into EPIC:    [facility] - [place], [place] - [address] PHARMACY [facility] - [place], [place] - [address]    Clinical concerns: None       Kathy Perez CMA              "

## 2019-10-29 ENCOUNTER — ONCOLOGY VISIT (OUTPATIENT)
Dept: TRANSPLANT | Facility: CLINIC | Age: 59
End: 2019-10-29
Attending: PHYSICIAN ASSISTANT
Payer: COMMERCIAL

## 2019-10-29 ENCOUNTER — APPOINTMENT (OUTPATIENT)
Dept: LAB | Facility: CLINIC | Age: 59
End: 2019-10-29
Attending: PHYSICIAN ASSISTANT
Payer: COMMERCIAL

## 2019-10-29 VITALS
TEMPERATURE: 97.5 F | WEIGHT: 170.7 LBS | SYSTOLIC BLOOD PRESSURE: 137 MMHG | DIASTOLIC BLOOD PRESSURE: 85 MMHG | BODY MASS INDEX: 23.9 KG/M2 | RESPIRATION RATE: 16 BRPM | HEART RATE: 73 BPM | OXYGEN SATURATION: 100 % | HEIGHT: 71 IN

## 2019-10-29 DIAGNOSIS — L40.50 PSORIASIS WITH ARTHROPATHY (H): ICD-10-CM

## 2019-10-29 DIAGNOSIS — C90.00 MULTIPLE MYELOMA NOT HAVING ACHIEVED REMISSION (H): Primary | ICD-10-CM

## 2019-10-29 PROCEDURE — 36415 COLL VENOUS BLD VENIPUNCTURE: CPT

## 2019-10-29 PROCEDURE — G0463 HOSPITAL OUTPT CLINIC VISIT: HCPCS | Mod: ZF

## 2019-10-29 RX ORDER — PENTAMIDINE ISETHIONATE 300 MG/300MG
300 INHALANT RESPIRATORY (INHALATION)
Status: CANCELLED
Start: 2019-11-15

## 2019-10-29 RX ORDER — ALBUTEROL SULFATE 5 MG/ML
2.5 SOLUTION RESPIRATORY (INHALATION)
Status: CANCELLED
Start: 2019-11-15

## 2019-10-29 RX ORDER — HEPARIN SODIUM,PORCINE 10 UNIT/ML
5 VIAL (ML) INTRAVENOUS
Status: CANCELLED | OUTPATIENT
Start: 2019-11-15

## 2019-10-29 ASSESSMENT — MIFFLIN-ST. JEOR: SCORE: 1598.24

## 2019-10-29 ASSESSMENT — PAIN SCALES - GENERAL: PAINLEVEL: NO PAIN (0)

## 2019-10-29 NOTE — NURSING NOTE
"Oncology Rooming Note    October 29, 2019 9:26 AM   Angel Yanez is a 59 year old male who presents for:    Chief Complaint   Patient presents with     Blood Draw     Labs drawn via VPT by RN in lab. VS taken. Pt checked in for next appt     RECHECK     Return: Multiple myeloma     Initial Vitals: /85 (BP Location: Left arm, Patient Position: Sitting, Cuff Size: Adult Regular)   Pulse 73   Temp 97.5  F (36.4  C) (Oral)   Resp 16   Ht 1.798 m (5' 10.8\")   Wt 77.4 kg (170 lb 11.2 oz)   SpO2 100%   BMI 23.94 kg/m   Estimated body mass index is 23.94 kg/m  as calculated from the following:    Height as of this encounter: 1.798 m (5' 10.8\").    Weight as of this encounter: 77.4 kg (170 lb 11.2 oz). Body surface area is 1.97 meters squared.  No Pain (0) Comment: Data Unavailable   No LMP for male patient.  Allergies reviewed: Yes  Medications reviewed: Yes    Medications: Medication refills not needed today.  Pharmacy name entered into EPIC:    [facility] [place] [facility] [place] [facility] [address]  [facility] - [place], [place] - [address]    Clinical concerns: N/A        Yesica Duncan CMA              "

## 2019-10-29 NOTE — PROGRESS NOTES
"BMT Clinic Note      Mr. Guille Yanez is 58 yo man with hx of Multiple Myeloma, s/p 2nd Autologous pbsct 5/2019. S/p 2 recent readmissions for FUO.      HPI: Returns for follow- up and labs. Went back to work yesterday. No fevers, cough, or URI sx. Denies n/v/d/c. No bleeding or rash. He does endorse some itching on legs and torso, mostly at night, using hydrocortisone cream prn. He reports some weight loss without trying; weight is down 9 pounds in the past 8 days but seems stable compared to a few weeks back. He is frustrated by ongoing low counts.     ROS: 10 point ROS neg other than the symptoms noted above in the HPI.    Physical Exam:   Blood pressure 137/85, pulse 73, temperature 97.5  F (36.4  C), temperature source Oral, resp. rate 16, height 1.798 m (5' 10.8\"), weight 77.4 kg (170 lb 11.2 oz), SpO2 100 %.    Wt Readings from Last 4 Encounters:   10/29/19 77.4 kg (170 lb 11.2 oz)   10/25/19 76.7 kg (169 lb)   10/23/19 77.6 kg (171 lb 1.6 oz)   10/16/19 78.7 kg (173 lb 6.4 oz)       General: NAD, talkative.  Eyes:  FARRAH, sclera anicteric   Neck: no LAD, thyroid symmetric without mass  Lymphatics: no edema.   Skin: no rash on exposed skin  Neuro: A&O. Normal gait.  Additional Findings: Hsieh site NT, no drainage.     Labs:  Lab Results   Component Value Date    WBC 2.9 (L) 10/29/2019    ANEU 1.4 (L) 10/29/2019    HGB 9.8 (L) 10/29/2019    HCT 30.1 (L) 10/29/2019    PLT 15 (LL) 10/29/2019     10/29/2019    POTASSIUM 3.9 10/29/2019    CHLORIDE 109 10/29/2019    CO2 27 10/29/2019    GLC 82 10/29/2019    BUN 14 10/29/2019    CR 0.73 10/29/2019    MAG 1.9 10/20/2019    INR 1.27 (H) 10/16/2019    BILITOTAL 0.6 10/29/2019    AST 13 10/29/2019    ALT 30 10/29/2019    ALKPHOS 153 (H) 10/29/2019    PROTTOTAL 6.7 (L) 10/29/2019    ALBUMIN 3.3 (L) 10/29/2019       Assessment and Plan:   Angel Yanez is a 58 yo man D+165 s/p 2nd autologous transplant for MM. S/p 2 recent readmissions for FUO (10/16-10/23; " 9/23-9/29/19).      1.  BMT/MM:   Slow engraftment; cell dose was only 0.639x10^6. (Marrow [place] - known poor cell dose as failed chemo-mobilization 1/2019.)   - Stringent CR.     2.  HEME: Keep Hgb>8g/dL, plt >10-20 (epistaxis requiring rhinorocket packing last admission). No bleeding. Plts trending down; plan for possible plts Friday.  - anemic, thrombocytopenic post BMT, low cell dose, +/- infection.   - intermitten neutropenia: GCSF last on 10/25; give prn ANC </=1000.  - No evidence of HLH on lymphnode bx 10/18/19.         3.  ID: Afebrile.  - mild rash last admission possibly secondary to cefpodoxime. Off prophy Abx now.  - 10/16 repeat chest CT: persistent mediastinal adenopathy no paranchymal disease.     - S/P bx of L axillary node on 10/18: No evidence of HLH, tissues looks reactive.  - empiric itraconazole 200 TID started last admission with resolution of fevers; currently on bid dosing. Pt declining recommened ID follow-up in December.    - 10/18 Histo/blasto urine antigen negative. Per Dr Kelley flucaonzole can suppress growth?   -10/18 Kairus DNA test- negative.   - prophy ACV, Pentamidine (last 10/17/19)  - PJP prophy pentamidine 10/17  - On CMV vaccine trial.   - CMV neg and EBV 2725 (10/16)     4. Pulm:   - 10/17: Echo with preserved EF, no evidence of right heart strain  - 10/17 VQ scan neg for PE     5. GI: no complaints.   - GI prophylaxis: omeprazole.    - mild AlkP elevation likely 2/2 GCSF; other LFTs wnl.     5.  FEN/Renal: Creat, lytes wnl.      6.  Mood: Continue Paxil.    7. Endocrine: ?wt loss, pruritis, no recent thyroid eval. Check TSH today--> normal (10/29).    Final Plan:  RTC Friday for labs, f/u, possible GCSF, plts.  11/6 for labs, BMbx  11/19 with Dr. Naveed Reynolds, PA-C  [phone]

## 2019-10-31 LAB
Lab: NORMAL
SPECIMEN SOURCE: NORMAL
YEAST SPEC QL CULT: NO GROWTH

## 2019-10-31 NOTE — PROGRESS NOTES
BMT Clinic Note      Mr. 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 S/p 2 recent readmission for FUO.      HPI: Returns for follow- up and labs. Feeling quite well without any new medical complaints. No fevers since starting itraconazole, dry cough has resolved. Skin is still is occasionally itchy but no rash.  Weight is stable today. Endorses a good appetite. No n/v/d or other localizing symptoms. Remains frustrated by ongoing low counts.      ROS: as above.     Physical Exam:   Blood pressure 121/70, pulse 82, temperature 97.8  F (36.6  C), temperature source Oral, weight 77.5 kg (170 lb 14.4 oz), SpO2 99 %.    Wt Readings from Last 4 Encounters:   11/01/19 77.5 kg (170 lb 14.4 oz)   10/29/19 77.4 kg (170 lb 11.2 oz)   10/25/19 76.7 kg (169 lb)   10/23/19 77.6 kg (171 lb 1.6 oz)       General: NAD, talkative.  Eyes:  FARRAH, sclera anicteric   Neck: no LAD appreciated  Heart: RRR without murmurs, rubs, or gallops  Lungs: CTAB    Lymphatics: no edema.   Skin: no rash on exposed skin  Neuro: A&O. Normal gait.  Additional Findings: Hsieh site NT, no drainage.     Labs:  Lab Results   Component Value Date    WBC 2.9 (L) 10/29/2019    ANEU 1.4 (L) 10/29/2019    HGB 9.8 (L) 10/29/2019    HCT 30.1 (L) 10/29/2019    PLT 15 (LL) 10/29/2019     10/29/2019    POTASSIUM 3.9 10/29/2019    CHLORIDE 109 10/29/2019    CO2 27 10/29/2019    GLC 82 10/29/2019    BUN 14 10/29/2019    CR 0.73 10/29/2019    MAG 1.9 10/20/2019    INR 1.27 (H) 10/16/2019    BILITOTAL 0.6 10/29/2019    AST 13 10/29/2019    ALT 30 10/29/2019    ALKPHOS 153 (H) 10/29/2019    PROTTOTAL 6.7 (L) 10/29/2019    ALBUMIN 3.3 (L) 10/29/2019       Assessment and Plan:   Angel Yanez is a 60 yo man D+168 s/p 2nd autologous transplant for MM. S/p 2 recent readmissions for FUO (10/16-10/23; 9/23-9/29/19).      1.  BMT/MM:   Slow engraftment; cell dose was only 0.639x10^6. (Marrow [place] - known poor cell dose as  failed chemo-mobilization 1/2019.)   - Stringent CR.  - day +180 bone marrow biopsy 11/6- 24 hour urine collection given toady.      2.  HEME: Keep Hgb>8g/dL, plt >10-20 (epistaxis requiring rhinorocket packing last admission). No bleeding. Plts trending down; Unclear etiology? bmbx next week. Repeat viral studies. - Platelets today with post platelet  - anemic, thrombocytopenic post BMT, low cell dose, +/- infection.   - intermittent neutropenia: GCSF last on 10/25; give prn ANC </=1000. Give gcsf today.   - No evidence of HLH on lymphnode bx 10/18/19.         3.  ID: Afebrile.  - mild rash last admission possibly secondary to cefpodoxime. Off prophy Abx now.  Persistent fevers: extensive ID work up unremarkable. 10/16 repeat chest CT: persistent mediastinal adenopathy no paranchymal disease. S/P bx of L axillary node on 10/18: No evidence of HLH, tissues looks reactive.  - Per ID, possible histoplasmosis: empiric itraconazole 200 TID started last admission with resolution of fevers; currently on bid dosing. Pt declining recommened ID follow-up in December.    - 10/18 Histo/blasto urine antigen negative. Per Dr Kelley flucaonzole can suppress growth?   -10/18 Kairus DNA test- negative.     - + Micrococcus from 10/20 blood culture: 1/2 cultures. Pt is no longer febrile and clinically improved. Treat with oral levaquin 500mg daily x 7d. Repeat blood culture today.     - prophy ACV, Pentamidine (last 10/17/19)  - On CMV vaccine trial.   - CMV neg and EBV 2725 (10/16)     4. Pulm:   - 10/17: Echo with preserved EF, no evidence of right heart strain  - 10/17 VQ scan neg for PE     5. GI: no complaints.   - GI prophylaxis: omeprazole.    - mild AlkP elevation likely 2/2 GCSF; other LFTs wnl.     5.  FEN/Renal: Creat, lytes wnl.      6.  Mood: Continue Paxil.    7. Endocrine: ?wt loss, pruritis, no recent thyroid eval. Checked TSH 10/29-- WNL.    Final Plan: platelets, gcsf, start levofloxacin, post platelet as pt requests  not coming until Wednesday for bmbx.     RTC: 11/6 for labs, provider visit and bmbx (pt does not want to come earlier)    11/19 with Dr. Lucio

## 2019-11-01 ENCOUNTER — INFUSION THERAPY VISIT (OUTPATIENT)
Dept: TRANSPLANT | Facility: CLINIC | Age: 59
End: 2019-11-01
Attending: NURSE PRACTITIONER
Payer: COMMERCIAL

## 2019-11-01 ENCOUNTER — APPOINTMENT (OUTPATIENT)
Dept: LAB | Facility: CLINIC | Age: 59
End: 2019-11-01
Attending: NURSE PRACTITIONER
Payer: COMMERCIAL

## 2019-11-01 VITALS
HEART RATE: 82 BPM | DIASTOLIC BLOOD PRESSURE: 70 MMHG | OXYGEN SATURATION: 99 % | TEMPERATURE: 97.8 F | BODY MASS INDEX: 23.97 KG/M2 | WEIGHT: 170.9 LBS | SYSTOLIC BLOOD PRESSURE: 121 MMHG

## 2019-11-01 VITALS
RESPIRATION RATE: 16 BRPM | HEART RATE: 63 BPM | TEMPERATURE: 97.8 F | OXYGEN SATURATION: 100 % | SYSTOLIC BLOOD PRESSURE: 132 MMHG | DIASTOLIC BLOOD PRESSURE: 81 MMHG

## 2019-11-01 DIAGNOSIS — R78.81 BACTEREMIA: ICD-10-CM

## 2019-11-01 DIAGNOSIS — L40.50 PSORIASIS WITH ARTHROPATHY (H): ICD-10-CM

## 2019-11-01 DIAGNOSIS — Z94.81 STATUS POST BONE MARROW TRANSPLANT (H): ICD-10-CM

## 2019-11-01 DIAGNOSIS — C90.00 MULTIPLE MYELOMA NOT HAVING ACHIEVED REMISSION (H): Primary | ICD-10-CM

## 2019-11-01 DIAGNOSIS — L40.50 PSORIASIS WITH ARTHROPATHY (H): Primary | ICD-10-CM

## 2019-11-01 LAB
ALBUMIN SERPL-MCNC: 3.5 G/DL (ref 3.4–5)
ALP SERPL-CCNC: 135 U/L (ref 40–150)
ALT SERPL W P-5'-P-CCNC: 28 U/L (ref 0–70)
ANION GAP SERPL CALCULATED.3IONS-SCNC: 4 MMOL/L (ref 3–14)
AST SERPL W P-5'-P-CCNC: 17 U/L (ref 0–45)
BASOPHILS # BLD AUTO: 0 10E9/L (ref 0–0.2)
BASOPHILS NFR BLD AUTO: 1 %
BILIRUB SERPL-MCNC: 0.7 MG/DL (ref 0.2–1.3)
BLD PROD TYP BPU: NORMAL
BLD UNIT ID BPU: 0
BLOOD PRODUCT CODE: NORMAL
BPU ID: NORMAL
BUN SERPL-MCNC: 12 MG/DL (ref 7–30)
CALCIUM SERPL-MCNC: 9.2 MG/DL (ref 8.5–10.1)
CHLORIDE SERPL-SCNC: 107 MMOL/L (ref 94–109)
CO2 SERPL-SCNC: 29 MMOL/L (ref 20–32)
CREAT SERPL-MCNC: 0.78 MG/DL (ref 0.66–1.25)
DIFFERENTIAL METHOD BLD: ABNORMAL
EOSINOPHIL # BLD AUTO: 0.1 10E9/L (ref 0–0.7)
EOSINOPHIL NFR BLD AUTO: 7.3 %
ERYTHROCYTE [DISTWIDTH] IN BLOOD BY AUTOMATED COUNT: 17.5 % (ref 10–15)
GFR SERPL CREATININE-BSD FRML MDRD: >90 ML/MIN/{1.73_M2}
GLUCOSE SERPL-MCNC: 91 MG/DL (ref 70–99)
HCT VFR BLD AUTO: 28.3 % (ref 40–53)
HGB BLD-MCNC: 9.4 G/DL (ref 13.3–17.7)
IMM GRANULOCYTES # BLD: 0 10E9/L (ref 0–0.4)
IMM GRANULOCYTES NFR BLD: 0 %
LYMPHOCYTES # BLD AUTO: 0.9 10E9/L (ref 0.8–5.3)
LYMPHOCYTES NFR BLD AUTO: 47.6 %
Lab: NORMAL
MCH RBC QN AUTO: 34.6 PG (ref 26.5–33)
MCHC RBC AUTO-ENTMCNC: 33.2 G/DL (ref 31.5–36.5)
MCV RBC AUTO: 104 FL (ref 78–100)
MONOCYTES # BLD AUTO: 0.3 10E9/L (ref 0–1.3)
MONOCYTES NFR BLD AUTO: 16.2 %
NEUTROPHILS # BLD AUTO: 0.5 10E9/L (ref 1.6–8.3)
NEUTROPHILS NFR BLD AUTO: 27.9 %
NRBC # BLD AUTO: 0 10*3/UL
NRBC BLD AUTO-RTO: 0 /100
PLATELET # BLD AUTO: 12 10E9/L (ref 150–450)
PLATELET # BLD AUTO: 40 10E9/L (ref 150–450)
POTASSIUM SERPL-SCNC: 4.6 MMOL/L (ref 3.4–5.3)
PROT SERPL-MCNC: 7 G/DL (ref 6.8–8.8)
RBC # BLD AUTO: 2.72 10E12/L (ref 4.4–5.9)
SODIUM SERPL-SCNC: 140 MMOL/L (ref 133–144)
SPECIMEN SOURCE: NORMAL
TRANSFUSION STATUS PATIENT QL: NORMAL
TRANSFUSION STATUS PATIENT QL: NORMAL
WBC # BLD AUTO: 1.9 10E9/L (ref 4–11)
YEAST SPEC QL CULT: NO GROWTH

## 2019-11-01 PROCEDURE — 36415 COLL VENOUS BLD VENIPUNCTURE: CPT

## 2019-11-01 PROCEDURE — 25000128 H RX IP 250 OP 636: Mod: ZF | Performed by: STUDENT IN AN ORGANIZED HEALTH CARE EDUCATION/TRAINING PROGRAM

## 2019-11-01 PROCEDURE — P9037 PLATE PHERES LEUKOREDU IRRAD: HCPCS | Performed by: NURSE PRACTITIONER

## 2019-11-01 PROCEDURE — 36430 TRANSFUSION BLD/BLD COMPNT: CPT

## 2019-11-01 PROCEDURE — 96372 THER/PROPH/DIAG INJ SC/IM: CPT

## 2019-11-01 PROCEDURE — 87040 BLOOD CULTURE FOR BACTERIA: CPT | Performed by: PHYSICIAN ASSISTANT

## 2019-11-01 PROCEDURE — 85025 COMPLETE CBC W/AUTO DIFF WBC: CPT | Performed by: PHYSICIAN ASSISTANT

## 2019-11-01 PROCEDURE — 80053 COMPREHEN METABOLIC PANEL: CPT | Performed by: PHYSICIAN ASSISTANT

## 2019-11-01 PROCEDURE — G0463 HOSPITAL OUTPT CLINIC VISIT: HCPCS | Mod: 25

## 2019-11-01 PROCEDURE — 85049 AUTOMATED PLATELET COUNT: CPT | Performed by: PHYSICIAN ASSISTANT

## 2019-11-01 RX ORDER — PENTAMIDINE ISETHIONATE 300 MG/300MG
300 INHALANT RESPIRATORY (INHALATION)
Status: CANCELLED
Start: 2019-11-15

## 2019-11-01 RX ORDER — HEPARIN SODIUM,PORCINE 10 UNIT/ML
5 VIAL (ML) INTRAVENOUS
Status: CANCELLED | OUTPATIENT
Start: 2019-11-15

## 2019-11-01 RX ORDER — LEVOFLOXACIN 500 MG/1
500 TABLET, FILM COATED ORAL DAILY
Qty: 7 TABLET | Refills: 0 | Status: ON HOLD | OUTPATIENT
Start: 2019-11-01 | End: 2019-11-14

## 2019-11-01 RX ORDER — ALBUTEROL SULFATE 5 MG/ML
2.5 SOLUTION RESPIRATORY (INHALATION)
Status: CANCELLED
Start: 2019-11-15

## 2019-11-01 RX ADMIN — FILGRASTIM 480 MCG: 480 INJECTION, SOLUTION INTRAVENOUS; SUBCUTANEOUS at 09:44

## 2019-11-01 ASSESSMENT — PAIN SCALES - GENERAL: PAINLEVEL: NO PAIN (0)

## 2019-11-01 NOTE — NURSING NOTE
"Oncology Rooming Note    November 1, 2019 9:01 AM   Angel Yanez is a 59 year old male who presents for:    Chief Complaint   Patient presents with     Blood Draw     labs drawn via venipuncture by RN in lab     RECHECK     Return: MM      Initial Vitals: /70 (BP Location: Left arm, Patient Position: Chair, Cuff Size: Adult Regular)   Pulse 82   Temp 97.8  F (36.6  C) (Oral)   Wt 77.5 kg (170 lb 14.4 oz)   SpO2 99%   BMI 23.97 kg/m   Estimated body mass index is 23.97 kg/m  as calculated from the following:    Height as of 10/29/19: 1.798 m (5' 10.8\").    Weight as of this encounter: 77.5 kg (170 lb 14.4 oz). Body surface area is 1.97 meters squared.  No Pain (0) Comment: Data Unavailable   No LMP for male patient.  Allergies reviewed: Yes  Medications reviewed: Yes    Medications: Medication refills not needed today.  Pharmacy name entered into EPIC:    [facility] [place] [facility] [address] [facility] - [place], [place] - [address]    Clinical concerns: None       Kathy Perez CMA              "

## 2019-11-01 NOTE — PROGRESS NOTES
Infusion Nursing Note:  Angel Yanez presents today for plts.    Patient seen by provider today: Yes: Fei Meng   present during visit today: Not Applicable.    Note: Blood culture drawn and sent x1 per Fei.  1u plts given.  Tolerated without incident.  Post plt count sent.  40K.  Okay to go per Fei.    Intravenous Access:  Peripheral IV placed.    Treatment Conditions:  Lab Results   Component Value Date    HGB 9.4 11/01/2019     Lab Results   Component Value Date    WBC 1.9 11/01/2019      Lab Results   Component Value Date    ANEU 0.5 11/01/2019     Lab Results   Component Value Date    PLT 40 11/01/2019      Results reviewed, labs did NOT meet treatment parameters: Given per Fei.      Post Infusion Assessment:  Patient tolerated infusion without incident.  Blood return noted pre and post infusion.  Site patent and intact, free from redness, edema or discomfort.  No evidence of extravasations.  Access discontinued per protocol.       Discharge Plan:   Discharge instructions reviewed with: Patient.  Patient and/or family verbalized understanding of discharge instructions and all questions answered.  Patient discharged in stable condition accompanied by: self.  Departure Mode: Ambulatory.    Rosana Brown RN

## 2019-11-03 LAB
BACTERIA SPEC CULT: NORMAL
Lab: ABNORMAL
Lab: NORMAL
SPECIMEN SOURCE: ABNORMAL
SPECIMEN SOURCE: NORMAL
SPECIMEN SOURCE: NORMAL
YEAST SPEC QL CULT: ABNORMAL
YEAST SPEC QL CULT: NO GROWTH

## 2019-11-05 LAB
BLD PROD TYP BPU: NORMAL
NUM BPU REQUESTED: 1

## 2019-11-06 ENCOUNTER — APPOINTMENT (OUTPATIENT)
Dept: LAB | Facility: CLINIC | Age: 59
End: 2019-11-06
Payer: COMMERCIAL

## 2019-11-06 ENCOUNTER — ONCOLOGY VISIT (OUTPATIENT)
Dept: TRANSPLANT | Facility: CLINIC | Age: 59
End: 2019-11-06
Attending: PHYSICIAN ASSISTANT
Payer: COMMERCIAL

## 2019-11-06 ENCOUNTER — HEALTH MAINTENANCE LETTER (OUTPATIENT)
Age: 59
End: 2019-11-06

## 2019-11-06 VITALS
DIASTOLIC BLOOD PRESSURE: 79 MMHG | RESPIRATION RATE: 16 BRPM | SYSTOLIC BLOOD PRESSURE: 137 MMHG | OXYGEN SATURATION: 95 % | HEART RATE: 84 BPM | TEMPERATURE: 98.9 F

## 2019-11-06 VITALS
BODY MASS INDEX: 23.69 KG/M2 | DIASTOLIC BLOOD PRESSURE: 84 MMHG | WEIGHT: 168.9 LBS | OXYGEN SATURATION: 100 % | TEMPERATURE: 98.4 F | RESPIRATION RATE: 16 BRPM | SYSTOLIC BLOOD PRESSURE: 136 MMHG | HEART RATE: 84 BPM

## 2019-11-06 DIAGNOSIS — L40.50 PSORIASIS WITH ARTHROPATHY (H): Primary | ICD-10-CM

## 2019-11-06 DIAGNOSIS — C90.01 MULTIPLE MYELOMA IN REMISSION (H): ICD-10-CM

## 2019-11-06 DIAGNOSIS — D80.1 HYPOGAMMAGLOBULINEMIA (H): Primary | ICD-10-CM

## 2019-11-06 DIAGNOSIS — C90.00 MULTIPLE MYELOMA NOT HAVING ACHIEVED REMISSION (H): ICD-10-CM

## 2019-11-06 LAB
ABO + RH BLD: NORMAL
ABO + RH BLD: NORMAL
ALBUMIN SERPL-MCNC: 3.7 G/DL (ref 3.4–5)
ALP SERPL-CCNC: 124 U/L (ref 40–150)
ALT SERPL W P-5'-P-CCNC: 24 U/L (ref 0–70)
ANION GAP SERPL CALCULATED.3IONS-SCNC: 6 MMOL/L (ref 3–14)
AST SERPL W P-5'-P-CCNC: 14 U/L (ref 0–45)
BASOPHILS # BLD AUTO: 0 10E9/L (ref 0–0.2)
BASOPHILS NFR BLD AUTO: 0 %
BILIRUB SERPL-MCNC: 0.8 MG/DL (ref 0.2–1.3)
BLD GP AB SCN SERPL QL: NORMAL
BLD PROD TYP BPU: NORMAL
BLD UNIT ID BPU: 0
BLOOD BANK CMNT PATIENT-IMP: NORMAL
BLOOD PRODUCT CODE: NORMAL
BPU ID: NORMAL
BUN SERPL-MCNC: 12 MG/DL (ref 7–30)
CALCIUM SERPL-MCNC: 9 MG/DL (ref 8.5–10.1)
CHLORIDE SERPL-SCNC: 107 MMOL/L (ref 94–109)
CO2 SERPL-SCNC: 27 MMOL/L (ref 20–32)
COLLECT DURATION TIME UR: 24 H
CREAT 24H UR-MRATE: 1.27 G/(24.H) (ref 1–2)
CREAT SERPL-MCNC: 0.81 MG/DL (ref 0.66–1.25)
CREAT UR-MCNC: 75 MG/DL
DIFFERENTIAL METHOD BLD: ABNORMAL
EOSINOPHIL # BLD AUTO: 0.3 10E9/L (ref 0–0.7)
EOSINOPHIL NFR BLD AUTO: 16.6 %
ERYTHROCYTE [DISTWIDTH] IN BLOOD BY AUTOMATED COUNT: 18.9 % (ref 10–15)
GFR SERPL CREATININE-BSD FRML MDRD: >90 ML/MIN/{1.73_M2}
GLUCOSE SERPL-MCNC: 92 MG/DL (ref 70–99)
HCT VFR BLD AUTO: 27.3 % (ref 40–53)
HGB BLD-MCNC: 9.1 G/DL (ref 13.3–17.7)
IGA SERPL-MCNC: 80 MG/DL (ref 70–380)
IGG SERPL-MCNC: 1210 MG/DL (ref 695–1620)
IGM SERPL-MCNC: 31 MG/DL (ref 60–265)
IMM GRANULOCYTES # BLD: 0 10E9/L (ref 0–0.4)
IMM GRANULOCYTES NFR BLD: 0 %
LDH SERPL L TO P-CCNC: 196 U/L (ref 85–227)
LYMPHOCYTES # BLD AUTO: 0.6 10E9/L (ref 0.8–5.3)
LYMPHOCYTES NFR BLD AUTO: 30.7 %
MAGNESIUM SERPL-MCNC: 2.2 MG/DL (ref 1.6–2.3)
MCH RBC QN AUTO: 35.1 PG (ref 26.5–33)
MCHC RBC AUTO-ENTMCNC: 33.3 G/DL (ref 31.5–36.5)
MCV RBC AUTO: 105 FL (ref 78–100)
MONOCYTES # BLD AUTO: 0.4 10E9/L (ref 0–1.3)
MONOCYTES NFR BLD AUTO: 20.6 %
NEUTROPHILS # BLD AUTO: 0.6 10E9/L (ref 1.6–8.3)
NEUTROPHILS NFR BLD AUTO: 32.1 %
NRBC # BLD AUTO: 0 10*3/UL
NRBC BLD AUTO-RTO: 0 /100
PHOSPHATE SERPL-MCNC: 3.5 MG/DL (ref 2.5–4.5)
PLATELET # BLD AUTO: 17 10E9/L (ref 150–450)
PLATELET # BLD EST: ABNORMAL 10*3/UL
POTASSIUM SERPL-SCNC: 3.9 MMOL/L (ref 3.4–5.3)
PROT 24H UR-MRATE: 0.14 G/(24.H) (ref 0.04–0.23)
PROT SERPL-MCNC: 7.5 G/DL (ref 6.8–8.8)
PROT UR-MCNC: 0.08 G/L
PROT/CREAT 24H UR: 0.11 G/G CR (ref 0–0.2)
RBC # BLD AUTO: 2.59 10E12/L (ref 4.4–5.9)
SODIUM SERPL-SCNC: 139 MMOL/L (ref 133–144)
SPECIMEN EXP DATE BLD: NORMAL
SPECIMEN VOL UR: 1680 ML
TRANSFUSION STATUS PATIENT QL: NORMAL
TRANSFUSION STATUS PATIENT QL: NORMAL
URATE SERPL-MCNC: 3.7 MG/DL (ref 3.5–7.2)
WBC # BLD AUTO: 2 10E9/L (ref 4–11)

## 2019-11-06 PROCEDURE — 82784 ASSAY IGA/IGD/IGG/IGM EACH: CPT | Performed by: PHYSICIAN ASSISTANT

## 2019-11-06 PROCEDURE — 40000611 ZZHCL STATISTIC MORPHOLOGY W/INTERP HEMEPATH TC 85060: Performed by: PHYSICIAN ASSISTANT

## 2019-11-06 PROCEDURE — 83615 LACTATE (LD) (LDH) ENZYME: CPT | Performed by: PHYSICIAN ASSISTANT

## 2019-11-06 PROCEDURE — 88237 TISSUE CULTURE BONE MARROW: CPT | Performed by: PHYSICIAN ASSISTANT

## 2019-11-06 PROCEDURE — 88305 TISSUE EXAM BY PATHOLOGIST: CPT | Performed by: PHYSICIAN ASSISTANT

## 2019-11-06 PROCEDURE — 84156 ASSAY OF PROTEIN URINE: CPT | Performed by: PHYSICIAN ASSISTANT

## 2019-11-06 PROCEDURE — 00000161 ZZHCL STATISTIC H-SPHEME PROCESS B/S: Performed by: PHYSICIAN ASSISTANT

## 2019-11-06 PROCEDURE — 88313 SPECIAL STAINS GROUP 2: CPT | Performed by: PHYSICIAN ASSISTANT

## 2019-11-06 PROCEDURE — 88311 DECALCIFY TISSUE: CPT | Performed by: PHYSICIAN ASSISTANT

## 2019-11-06 PROCEDURE — 40001004 ZZHCL STATISTIC FLOW INT 9-15 ABY TC 88188: Performed by: PHYSICIAN ASSISTANT

## 2019-11-06 PROCEDURE — 40000795 ZZHCL STATISTIC DNA PROCESS AND HOLD: Performed by: PHYSICIAN ASSISTANT

## 2019-11-06 PROCEDURE — 96372 THER/PROPH/DIAG INJ SC/IM: CPT | Mod: 59

## 2019-11-06 PROCEDURE — P9037 PLATE PHERES LEUKOREDU IRRAD: HCPCS | Performed by: NURSE PRACTITIONER

## 2019-11-06 PROCEDURE — 86850 RBC ANTIBODY SCREEN: CPT | Performed by: PHYSICIAN ASSISTANT

## 2019-11-06 PROCEDURE — 83735 ASSAY OF MAGNESIUM: CPT | Performed by: PHYSICIAN ASSISTANT

## 2019-11-06 PROCEDURE — 88264 CHROMOSOME ANALYSIS 20-25: CPT | Performed by: PHYSICIAN ASSISTANT

## 2019-11-06 PROCEDURE — 38222 DX BONE MARROW BX & ASPIR: CPT | Performed by: PHYSICIAN ASSISTANT

## 2019-11-06 PROCEDURE — 88185 FLOWCYTOMETRY/TC ADD-ON: CPT | Performed by: PHYSICIAN ASSISTANT

## 2019-11-06 PROCEDURE — 38222 DX BONE MARROW BX & ASPIR: CPT | Mod: ZF

## 2019-11-06 PROCEDURE — 83883 ASSAY NEPHELOMETRY NOT SPEC: CPT | Performed by: PHYSICIAN ASSISTANT

## 2019-11-06 PROCEDURE — 81050 URINALYSIS VOLUME MEASURE: CPT | Performed by: PHYSICIAN ASSISTANT

## 2019-11-06 PROCEDURE — 80053 COMPREHEN METABOLIC PANEL: CPT | Performed by: PHYSICIAN ASSISTANT

## 2019-11-06 PROCEDURE — 88184 FLOWCYTOMETRY/ TC 1 MARKER: CPT | Performed by: PHYSICIAN ASSISTANT

## 2019-11-06 PROCEDURE — 36430 TRANSFUSION BLD/BLD COMPNT: CPT | Mod: 59

## 2019-11-06 PROCEDURE — 85025 COMPLETE CBC W/AUTO DIFF WBC: CPT | Performed by: PHYSICIAN ASSISTANT

## 2019-11-06 PROCEDURE — 84550 ASSAY OF BLOOD/URIC ACID: CPT | Performed by: PHYSICIAN ASSISTANT

## 2019-11-06 PROCEDURE — 86335 IMMUNFIX E-PHORSIS/URINE/CSF: CPT | Performed by: PHYSICIAN ASSISTANT

## 2019-11-06 PROCEDURE — 84165 PROTEIN E-PHORESIS SERUM: CPT | Performed by: PHYSICIAN ASSISTANT

## 2019-11-06 PROCEDURE — 86334 IMMUNOFIX E-PHORESIS SERUM: CPT | Performed by: PHYSICIAN ASSISTANT

## 2019-11-06 PROCEDURE — 86900 BLOOD TYPING SEROLOGIC ABO: CPT | Performed by: PHYSICIAN ASSISTANT

## 2019-11-06 PROCEDURE — 40000951 ZZHCL STATISTIC BONE MARROW INTERP TC 85097: Performed by: PHYSICIAN ASSISTANT

## 2019-11-06 PROCEDURE — 88342 IMHCHEM/IMCYTCHM 1ST ANTB: CPT | Performed by: PHYSICIAN ASSISTANT

## 2019-11-06 PROCEDURE — 84100 ASSAY OF PHOSPHORUS: CPT | Performed by: PHYSICIAN ASSISTANT

## 2019-11-06 PROCEDURE — 88161 CYTOPATH SMEAR OTHER SOURCE: CPT | Performed by: PHYSICIAN ASSISTANT

## 2019-11-06 PROCEDURE — 88271 CYTOGENETICS DNA PROBE: CPT | Performed by: PHYSICIAN ASSISTANT

## 2019-11-06 PROCEDURE — 82306 VITAMIN D 25 HYDROXY: CPT | Performed by: PHYSICIAN ASSISTANT

## 2019-11-06 PROCEDURE — 00000402 ZZHCL STATISTIC TOTAL PROTEIN: Performed by: PHYSICIAN ASSISTANT

## 2019-11-06 PROCEDURE — 88341 IMHCHEM/IMCYTCHM EA ADD ANTB: CPT | Performed by: PHYSICIAN ASSISTANT

## 2019-11-06 PROCEDURE — 25000128 H RX IP 250 OP 636: Mod: ZF | Performed by: STUDENT IN AN ORGANIZED HEALTH CARE EDUCATION/TRAINING PROGRAM

## 2019-11-06 PROCEDURE — 86901 BLOOD TYPING SEROLOGIC RH(D): CPT | Performed by: PHYSICIAN ASSISTANT

## 2019-11-06 PROCEDURE — 88275 CYTOGENETICS 100-300: CPT | Performed by: PHYSICIAN ASSISTANT

## 2019-11-06 PROCEDURE — 88280 CHROMOSOME KARYOTYPE STUDY: CPT | Performed by: PHYSICIAN ASSISTANT

## 2019-11-06 RX ORDER — ALBUTEROL SULFATE 5 MG/ML
2.5 SOLUTION RESPIRATORY (INHALATION)
Status: CANCELLED
Start: 2019-11-15

## 2019-11-06 RX ORDER — PENTAMIDINE ISETHIONATE 300 MG/300MG
300 INHALANT RESPIRATORY (INHALATION)
Status: CANCELLED
Start: 2019-11-15

## 2019-11-06 RX ORDER — HEPARIN SODIUM,PORCINE 10 UNIT/ML
5 VIAL (ML) INTRAVENOUS
Status: CANCELLED | OUTPATIENT
Start: 2019-11-15

## 2019-11-06 RX ADMIN — FILGRASTIM 480 MCG: 480 INJECTION, SOLUTION INTRAVENOUS; SUBCUTANEOUS at 11:53

## 2019-11-06 ASSESSMENT — PAIN SCALES - GENERAL: PAINLEVEL: NO PAIN (0)

## 2019-11-06 NOTE — PROGRESS NOTES
"BMT Clinic Note      Mr. Guille Yanez is 58 yo man with hx of Multiple Myeloma, s/p 2nd Autologous pbsct 5/2019. S/p 2 recent readmission for FUO.      HPI: Returns for day +180 bone marrow biopsy and follow-up.  He is feeling okay.  No fevers, cough, cold symptoms.  Has some sore muscles in the front of his neck, wonders if that can be from being on \"the antibiotic that makes your tendons snap.\"  He endorses that he has TMJ disorder and clenches his teeth a lot, but that doesn't typically cause any neck soreness.  He denies sore throat.  No bleeding.  Eating fine, no n/v/d.  No rash.  Worried about if his counts will ever recover.     ROS: as above.     Physical Exam:   There were no vitals taken for this visit.    Wt Readings from Last 4 Encounters:   11/06/19 76.6 kg (168 lb 14.4 oz)   11/01/19 77.5 kg (170 lb 14.4 oz)   10/29/19 77.4 kg (170 lb 11.2 oz)   10/25/19 76.7 kg (169 lb)       General: NAD, talkative.  Eyes:  FARRAH, sclera anicteric   Neck: no LAD appreciated; no asymmetry, masses or lumps.  Heart: RRR without murmurs, rubs, or gallops  Lungs: CTAB    Lymphatics: no edema.   Skin: no rash    Neuro: A&O. Slightly antalgic gait, favoring left hip.   PIV right arm    Labs:  Lab Results   Component Value Date    WBC 2.0 (L) 11/06/2019    ANEU 0.6 (L) 11/06/2019    HGB 9.1 (L) 11/06/2019    HCT 27.3 (L) 11/06/2019    PLT 17 (LL) 11/06/2019     11/06/2019    POTASSIUM 3.9 11/06/2019    CHLORIDE 107 11/06/2019    CO2 27 11/06/2019    GLC 92 11/06/2019    BUN 12 11/06/2019    CR 0.81 11/06/2019    MAG 2.2 11/06/2019    INR 1.27 (H) 10/16/2019    BILITOTAL 0.8 11/06/2019    AST 14 11/06/2019    ALT 24 11/06/2019    ALKPHOS 124 11/06/2019    PROTTOTAL 7.5 11/06/2019    ALBUMIN 3.7 11/06/2019       Assessment and Plan:   Angel Yanez is a 58 yo man D+173 s/p 2nd autologous transplant for MM. S/p 2 recent readmissions for FUO (10/16-10/23; 9/23-9/29/19).      1.  BMT/MM:   Slow engraftment; cell dose was " only 0.639x10^6. (Marrow [place] - known poor cell dose as failed chemo-mobilization 1/2019.)   - Stringent CR.  - day +180 bone marrow biopsy 11/6      2.  HEME: Keep Hgb>8g/dL, plt >10-20 (epistaxis requiring rhinorocket packing last admission).  Given bone marrow biopsy done today and plts 17K, will give a dose of platelets.  Recheck 11/11 upon return; sooner if he has any bleeding.  - anemic, thrombocytopenic post BMT, low cell dose, +/- infection.   - intermittent neutropenia: GCSF last on 10/25; give prn ANC </=1000. Give gcsf today 11/6 for ANC of 0.6.   - No evidence of HLH on lymphnode bx 10/18/19.         3.  ID: Afebrile.  Persistent fevers: extensive ID work up unremarkable. 10/16 repeat chest CT: persistent mediastinal adenopathy no paranchymal disease. S/P bx of L axillary node on 10/18: No evidence of HLH, tissues looks reactive.  - Per ID, possible histoplasmosis: empiric itraconazole 200 TID started last admission with resolution of fevers; currently on bid dosing. Pt declining recommened ID follow-up in December.    - 10/18 Histo/blasto urine antigen negative. Per Dr Kelley flucaonzole can suppress growth?   -10/18 Kairus DNA test- negative.     - + Micrococcus from 10/20 blood culture:  Treat with oral levaquin 500mg daily x 7d (11/1-11/7).    - May require prophy levaquin after treatment course stops, as he is persistently neutropenic; address this 11/11 upon return.     - prophy ACV, Pentamidine (last 10/17/19)  - On CMV vaccine trial.   - CMV neg and EBV 2725 (10/16)     4. Pulm:   - 10/17: Echo with preserved EF, no evidence of right heart strain  - 10/17 VQ scan neg for PE     5. GI:    - GI prophylaxis: omeprazole.    - LFTs are okay     5.  FEN/Renal: Creat, lytes wnl.      6.  Mood: Continue Paxil.    Final Plan: platelets, gcsf; rtc 11/11 for f/u, possible platelets; decision on prophy abx if he is still neutropenic. BMBx done 11/6.    11/19 with Dr. Naveed Pride,  KAMALA  11/6/2019

## 2019-11-06 NOTE — NURSING NOTE
BMT Teaching Flowsheet  Teaching Topic: bone marrow biopsy  Person(s) involved in teaching: Patient  Motivation Level  Asks Questions: Yes  Eager to Learn: Yes  Cooperative: Yes  Receptive (willing/able to accept information): Yes  Patient demonstrates understanding of the following:   - Reason for the appointment, diagnosis and treatment plan: Yes  - Knowledge of proper use of medications and conditions for which they are ordered (with special attention to potential side effects or drug interactions): Yes  - Which situations necessitate calling provider and whom to contact: Yes  Teaching concerns addressed: what to expect during and post procedure including restrictions  Proper use and care of (medical equipment, care aids, etc.) NA  Pain management techniques: Yes  Patient instructed on hand hygiene: NA  How and/when to access community resources: NA  Infection Control:  Patient demonstrates understanding of the following:   Surgical procedure site care taught Yes  Signs and symptoms of infection taught Yes  Wound care taught Yes  Central venous catheter care taught NA  Instructional Materials Used/Given: post procedure instructions  Pt does not want any IV premeds,   PIV placed for labs and possible infusions

## 2019-11-06 NOTE — PROGRESS NOTES
Infusion Nursing Note:  Angel Yanez presents today for plts.    Patient seen by provider today: Yes   present during visit today: Not Applicable.    Note: Pt given 1u plts.  Tolerated without incident.    Intravenous Access:  Peripheral IV placed.    Treatment Conditions:  Lab Results   Component Value Date    HGB 9.1 11/06/2019     Lab Results   Component Value Date    WBC 2.0 11/06/2019      Lab Results   Component Value Date    ANEU 0.6 11/06/2019     Lab Results   Component Value Date    PLT 17 11/06/2019      Results reviewed, labs MET treatment parameters, ok to proceed with treatment.      Post Infusion Assessment:  Patient tolerated infusion without incident.  Blood return noted pre and post infusion.  Site patent and intact, free from redness, edema or discomfort.  No evidence of extravasations.  Access discontinued per protocol.       Discharge Plan:   Discharge instructions reviewed with: Patient.  Patient and/or family verbalized understanding of discharge instructions and all questions answered.  Patient discharged in stable condition accompanied by: self.  Departure Mode: Ambulatory.    Rosana Brown RN

## 2019-11-06 NOTE — PROGRESS NOTES
BMT ONC Adult Bone Marrow Biopsy Procedure Note  November 6, 2019  /84   Pulse 84   Temp 98.4  F (36.9  C)   Resp 16   Wt 76.6 kg (168 lb 14.4 oz)   SpO2 100%   BMI 23.69 kg/m       Learning needs assessment complete within 12 months? YES    DIAGNOSIS: multiple myeloma, s/p two autologous stem cell transplants     PROCEDURE: Unilateral Bone Marrow Biopsy and Unilateral Aspirate    [place] [facility] [place] [facility] [place]    Patient s identification was positively verified by verbal identification and invasive procedure safety checklist was completed. Informed consent was obtained. Following the administration of no premedication per patient preference as pre-medication, patient was placed in the prone position and prepped and draped in a sterile manner. Approximately 10 cc of 1% Lidocaine was used over the left posterior iliac spine. Following this a 3 mm incision was made. Trephine bone marrow core(s) was (were) obtained from the [facility]. Bone marrow aspirates were obtained from the [facility]. Aspirates were sent for morphology, immunophenotyping, cytogenetics and molecular diagnostics (process and hold). A total of approximately 18 ml of marrow was aspirated. Following this procedure a sterile dressing was applied to the bone marrow biopsy site(s). The patient was placed in the supine position to maintain pressure on the biopsy site. Post-procedure wound care instructions were given.     Complications: NO    Pre-procedural pain: 0 out of 10 on the numeric pain rating scale.     Procedural pain: 3 out of 10 on the numeric pain rating scale.     Post-procedural pain assessment: 0 out of 10 on the numeric pain rating scale.     Interventions: Unable to get a good core on first attempt, possibly due to presence of scar tissue.  Moved slightly laterally and was able to get good samples thereafter.     Length of procedure:20 minutes or less      Procedure performed by: Latrice Pride PA-C

## 2019-11-07 LAB
ALBUMIN SERPL ELPH-MCNC: 4.2 G/DL (ref 3.7–5.1)
ALPHA1 GLOB SERPL ELPH-MCNC: 0.4 G/DL (ref 0.2–0.4)
ALPHA2 GLOB SERPL ELPH-MCNC: 0.8 G/DL (ref 0.5–0.9)
B-GLOBULIN SERPL ELPH-MCNC: 0.7 G/DL (ref 0.6–1)
BACTERIA SPEC CULT: NO GROWTH
COPATH REPORT: NORMAL
COPATH REPORT: NORMAL
GAMMA GLOB SERPL ELPH-MCNC: 1.1 G/DL (ref 0.7–1.6)
KAPPA LC UR-MCNC: 1.18 MG/DL (ref 0.33–1.94)
KAPPA LC/LAMBDA SER: 0.57 {RATIO} (ref 0.26–1.65)
LAMBDA LC SERPL-MCNC: 2.06 MG/DL (ref 0.57–2.63)
Lab: NORMAL
M PROTEIN SERPL ELPH-MCNC: 0.2 G/DL
PROT ELPH PNL UR ELPH: NORMAL
PROT PATTERN SERPL ELPH-IMP: ABNORMAL
PROT PATTERN SERPL IFE-IMP: NORMAL
PROT PATTERN UR ELPH-IMP: NORMAL
SPECIMEN SOURCE: NORMAL

## 2019-11-08 ENCOUNTER — ONCOLOGY VISIT (OUTPATIENT)
Dept: TRANSPLANT | Facility: CLINIC | Age: 59
End: 2019-11-08
Attending: INTERNAL MEDICINE
Payer: COMMERCIAL

## 2019-11-08 ENCOUNTER — APPOINTMENT (OUTPATIENT)
Dept: LAB | Facility: CLINIC | Age: 59
End: 2019-11-08
Attending: INTERNAL MEDICINE
Payer: COMMERCIAL

## 2019-11-08 ENCOUNTER — ANCILLARY PROCEDURE (OUTPATIENT)
Dept: GENERAL RADIOLOGY | Facility: CLINIC | Age: 59
End: 2019-11-08
Attending: INTERNAL MEDICINE
Payer: COMMERCIAL

## 2019-11-08 VITALS
OXYGEN SATURATION: 99 % | BODY MASS INDEX: 23.75 KG/M2 | RESPIRATION RATE: 16 BRPM | TEMPERATURE: 99.8 F | SYSTOLIC BLOOD PRESSURE: 115 MMHG | WEIGHT: 169.3 LBS | DIASTOLIC BLOOD PRESSURE: 70 MMHG | HEART RATE: 98 BPM

## 2019-11-08 DIAGNOSIS — C90.01 MULTIPLE MYELOMA IN REMISSION (H): Primary | ICD-10-CM

## 2019-11-08 DIAGNOSIS — C90.01 MULTIPLE MYELOMA IN REMISSION (H): ICD-10-CM

## 2019-11-08 LAB
ALBUMIN UR-MCNC: 30 MG/DL
ANION GAP SERPL CALCULATED.3IONS-SCNC: 8 MMOL/L (ref 3–14)
APPEARANCE UR: ABNORMAL
BACTERIA #/AREA URNS HPF: ABNORMAL /HPF
BASOPHILS # BLD AUTO: 0 10E9/L (ref 0–0.2)
BASOPHILS NFR BLD AUTO: 0.2 %
BILIRUB UR QL STRIP: NEGATIVE
BUN SERPL-MCNC: 10 MG/DL (ref 7–30)
CALCIUM SERPL-MCNC: 8.6 MG/DL (ref 8.5–10.1)
CHLORIDE SERPL-SCNC: 104 MMOL/L (ref 94–109)
CO2 SERPL-SCNC: 25 MMOL/L (ref 20–32)
COLOR UR AUTO: ABNORMAL
COPATH REPORT: NORMAL
COPATH REPORT: NORMAL
CREAT SERPL-MCNC: 0.96 MG/DL (ref 0.66–1.25)
DIFFERENTIAL METHOD BLD: ABNORMAL
EOSINOPHIL # BLD AUTO: 0.5 10E9/L (ref 0–0.7)
EOSINOPHIL NFR BLD AUTO: 8.1 %
ERYTHROCYTE [DISTWIDTH] IN BLOOD BY AUTOMATED COUNT: 19.9 % (ref 10–15)
GFR SERPL CREATININE-BSD FRML MDRD: 85 ML/MIN/{1.73_M2}
GLUCOSE SERPL-MCNC: 118 MG/DL (ref 70–99)
GLUCOSE UR STRIP-MCNC: NEGATIVE MG/DL
HCT VFR BLD AUTO: 23.9 % (ref 40–53)
HGB BLD-MCNC: 8 G/DL (ref 13.3–17.7)
HGB UR QL STRIP: NEGATIVE
HYALINE CASTS #/AREA URNS LPF: 6 /LPF (ref 0–2)
IMM GRANULOCYTES # BLD: 0.2 10E9/L (ref 0–0.4)
IMM GRANULOCYTES NFR BLD: 4.2 %
KETONES UR STRIP-MCNC: NEGATIVE MG/DL
LEUKOCYTE ESTERASE UR QL STRIP: NEGATIVE
LYMPHOCYTES # BLD AUTO: 0.6 10E9/L (ref 0.8–5.3)
LYMPHOCYTES NFR BLD AUTO: 11.3 %
MCH RBC QN AUTO: 34.9 PG (ref 26.5–33)
MCHC RBC AUTO-ENTMCNC: 33.5 G/DL (ref 31.5–36.5)
MCV RBC AUTO: 104 FL (ref 78–100)
MONOCYTES # BLD AUTO: 0.4 10E9/L (ref 0–1.3)
MONOCYTES NFR BLD AUTO: 6.2 %
MUCOUS THREADS #/AREA URNS LPF: PRESENT /LPF
NEUTROPHILS # BLD AUTO: 4 10E9/L (ref 1.6–8.3)
NEUTROPHILS NFR BLD AUTO: 70 %
NITRATE UR QL: NEGATIVE
NRBC # BLD AUTO: 0 10*3/UL
NRBC BLD AUTO-RTO: 0 /100
PH UR STRIP: 5 PH (ref 5–7)
PLATELET # BLD AUTO: 12 10E9/L (ref 150–450)
PLATELET # BLD EST: ABNORMAL 10*3/UL
POTASSIUM SERPL-SCNC: 4 MMOL/L (ref 3.4–5.3)
RBC # BLD AUTO: 2.29 10E12/L (ref 4.4–5.9)
RBC #/AREA URNS AUTO: 2 /HPF (ref 0–2)
SODIUM SERPL-SCNC: 136 MMOL/L (ref 133–144)
SOURCE: ABNORMAL
SP GR UR STRIP: 1.03 (ref 1–1.03)
TRANSF REACT PLASRBC-IMP: NORMAL
UROBILINOGEN UR STRIP-MCNC: 0 MG/DL (ref 0–2)
WBC # BLD AUTO: 5.7 10E9/L (ref 4–11)
WBC #/AREA URNS AUTO: 3 /HPF (ref 0–5)

## 2019-11-08 PROCEDURE — 87040 BLOOD CULTURE FOR BACTERIA: CPT | Performed by: NURSE PRACTITIONER

## 2019-11-08 PROCEDURE — 81001 URINALYSIS AUTO W/SCOPE: CPT | Performed by: INTERNAL MEDICINE

## 2019-11-08 PROCEDURE — 86900 BLOOD TYPING SEROLOGIC ABO: CPT | Performed by: INTERNAL MEDICINE

## 2019-11-08 PROCEDURE — 85025 COMPLETE CBC W/AUTO DIFF WBC: CPT | Performed by: NURSE PRACTITIONER

## 2019-11-08 PROCEDURE — 86901 BLOOD TYPING SEROLOGIC RH(D): CPT | Performed by: INTERNAL MEDICINE

## 2019-11-08 PROCEDURE — 80048 BASIC METABOLIC PNL TOTAL CA: CPT | Performed by: NURSE PRACTITIONER

## 2019-11-08 PROCEDURE — G0463 HOSPITAL OUTPT CLINIC VISIT: HCPCS | Mod: ZF

## 2019-11-08 PROCEDURE — 36415 COLL VENOUS BLD VENIPUNCTURE: CPT

## 2019-11-08 PROCEDURE — 86850 RBC ANTIBODY SCREEN: CPT | Performed by: INTERNAL MEDICINE

## 2019-11-08 PROCEDURE — 86923 COMPATIBILITY TEST ELECTRIC: CPT | Performed by: INTERNAL MEDICINE

## 2019-11-08 ASSESSMENT — PAIN SCALES - GENERAL: PAINLEVEL: NO PAIN (0)

## 2019-11-08 NOTE — NURSING NOTE
"Oncology Rooming Note    November 8, 2019 11:16 AM   Angel Yanez is a 59 year old male who presents for:    Chief Complaint   Patient presents with     Lab Only     venipuncture, vitals     RECHECK     Return: Multiple Myeloma      Initial Vitals: /70   Pulse 98   Temp 99.8  F (37.7  C)   Resp 16   Wt 76.8 kg (169 lb 4.8 oz)   SpO2 99%   BMI 23.75 kg/m   Estimated body mass index is 23.75 kg/m  as calculated from the following:    Height as of 10/29/19: 1.798 m (5' 10.8\").    Weight as of this encounter: 76.8 kg (169 lb 4.8 oz). Body surface area is 1.96 meters squared.  No Pain (0) Comment: Data Unavailable   No LMP for male patient.  Allergies reviewed: Yes  Medications reviewed: Yes    Medications: Medication refills not needed today.  Pharmacy name entered into EPIC:    [facility] - [place] - [address] [facility] - [place], [place] - [address]    Clinical concerns: None       Kathy Perez CMA              "

## 2019-11-08 NOTE — PROGRESS NOTES
BMT Clinic Note      Mr. 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 S/p 2 recent readmission for FUO.      HPI: Returns for day +175 bone marrow biopsy and follow-up.  He called in for fevers which has been going on for the past 3 months and has had multiple admissions for the same without any definitive diagnosis as yet.    He now had fevers on Wed, Thu and Fri and has taken tylenol with resolution of fevers. He has a cough when he gets fevers but overall he looks well. He would complete his prophylactic levofloxacin today     ROS: as above.     Physical Exam:   Blood pressure 115/70, pulse 98, temperature 99.8  F (37.7  C), resp. rate 16, weight 76.8 kg (169 lb 4.8 oz), SpO2 99 %.    Wt Readings from Last 4 Encounters:   11/08/19 76.8 kg (169 lb 4.8 oz)   11/06/19 76.6 kg (168 lb 14.4 oz)   11/01/19 77.5 kg (170 lb 14.4 oz)   10/29/19 77.4 kg (170 lb 11.2 oz)       General: NAD, talkative.  Eyes:  FARRAH, sclera anicteric   Neck: no LAD appreciated; no asymmetry, masses or lumps.  Heart: RRR without murmurs, rubs, or gallops  Lungs: CTAB    Lymphatics: no edema.   Skin: no rash    Neuro: A&O. Slightly antalgic gait, favoring left hip.   PIV right arm    Labs:  Lab Results   Component Value Date    WBC 5.7 11/08/2019    ANEU 4.0 11/08/2019    HGB 8.0 (L) 11/08/2019    HCT 23.9 (L) 11/08/2019    PLT 12 (LL) 11/08/2019     11/08/2019    POTASSIUM 4.0 11/08/2019    CHLORIDE 104 11/08/2019    CO2 25 11/08/2019     (H) 11/08/2019    BUN 10 11/08/2019    CR 0.96 11/08/2019    MAG 2.2 11/06/2019    INR 1.27 (H) 10/16/2019    BILITOTAL 0.8 11/06/2019    AST 14 11/06/2019    ALT 24 11/06/2019    ALKPHOS 124 11/06/2019    PROTTOTAL 7.5 11/06/2019    ALBUMIN 3.7 11/06/2019       Assessment and Plan:   Angel Yanez is a 60 yo man D+173 s/p 2nd autologous transplant for MM. S/p 2 recent readmissions for FUO (10/16-10/23; 9/23-9/29/19).     I have reviewed his chart to a  reasonable extent.    I am now suspecting the cause of his fevers may be autoimmune/inflammatory ?psoriatic arthritis. Extensive investigations has not revealed a source at this time. He also looks well and is asymptomatic except for the cough which seems to be chronic. I will order an BINU and a RF panel for the next visit and I am strongly considering prednisone on Monday. We are pending Blood cx, UA and CXR from today.    His BM shows adequate granulo and erythropoiesis but his platelet precursorts are 0 to absent which is reflected in his peripheral CBC. We might have to consider thrombopoietin agonists. I will check with Dr. Lucio regarding this.    Elia RAMOS MS  Attending Physician  Pager - [phone]  Email - [email or web address]

## 2019-11-09 ENCOUNTER — ONCOLOGY VISIT (OUTPATIENT)
Dept: TRANSPLANT | Facility: CLINIC | Age: 59
DRG: 853 | End: 2019-11-09
Attending: INTERNAL MEDICINE
Payer: COMMERCIAL

## 2019-11-09 ENCOUNTER — HOSPITAL ENCOUNTER (INPATIENT)
Facility: CLINIC | Age: 59
LOS: 8 days | Discharge: HOME | DRG: 853 | End: 2019-11-17
Attending: INTERNAL MEDICINE | Admitting: INTERNAL MEDICINE
Payer: COMMERCIAL

## 2019-11-09 ENCOUNTER — APPOINTMENT (OUTPATIENT)
Dept: CT IMAGING | Facility: CLINIC | Age: 59
DRG: 853 | End: 2019-11-09
Attending: INTERNAL MEDICINE
Payer: COMMERCIAL

## 2019-11-09 ENCOUNTER — APPOINTMENT (OUTPATIENT)
Dept: LAB | Facility: CLINIC | Age: 59
DRG: 853 | End: 2019-11-09
Attending: INTERNAL MEDICINE
Payer: COMMERCIAL

## 2019-11-09 VITALS
DIASTOLIC BLOOD PRESSURE: 71 MMHG | BODY MASS INDEX: 23.8 KG/M2 | SYSTOLIC BLOOD PRESSURE: 114 MMHG | HEART RATE: 89 BPM | WEIGHT: 169.7 LBS | RESPIRATION RATE: 16 BRPM | OXYGEN SATURATION: 98 % | TEMPERATURE: 98.7 F

## 2019-11-09 DIAGNOSIS — L40.50 PSORIASIS WITH ARTHROPATHY (H): ICD-10-CM

## 2019-11-09 DIAGNOSIS — C90.01 MULTIPLE MYELOMA IN REMISSION (H): Primary | ICD-10-CM

## 2019-11-09 DIAGNOSIS — C90.00 MULTIPLE MYELOMA NOT HAVING ACHIEVED REMISSION (H): Primary | ICD-10-CM

## 2019-11-09 PROBLEM — A68.9 RECURRENT FEVER: Status: ACTIVE | Noted: 2019-11-09

## 2019-11-09 LAB
ACANTHOCYTES BLD QL SMEAR: SLIGHT
ANION GAP SERPL CALCULATED.3IONS-SCNC: 8 MMOL/L (ref 3–14)
ANISOCYTOSIS BLD QL SMEAR: ABNORMAL
APTT PPP: 49 SEC (ref 22–37)
BASOPHILS # BLD AUTO: 0 10E9/L (ref 0–0.2)
BASOPHILS NFR BLD AUTO: 0 %
BLD PROD TYP BPU: NORMAL
BLD UNIT ID BPU: 0
BLOOD PRODUCT CODE: NORMAL
BPU ID: NORMAL
BUN SERPL-MCNC: 16 MG/DL (ref 7–30)
CALCIUM SERPL-MCNC: 8.8 MG/DL (ref 8.5–10.1)
CHLORIDE SERPL-SCNC: 102 MMOL/L (ref 94–109)
CO2 SERPL-SCNC: 25 MMOL/L (ref 20–32)
CREAT SERPL-MCNC: 1.21 MG/DL (ref 0.66–1.25)
DACRYOCYTES BLD QL SMEAR: SLIGHT
DIFFERENTIAL METHOD BLD: ABNORMAL
EOSINOPHIL # BLD AUTO: 0.5 10E9/L (ref 0–0.7)
EOSINOPHIL NFR BLD AUTO: 7.8 %
ERYTHROCYTE [DISTWIDTH] IN BLOOD BY AUTOMATED COUNT: 20.7 % (ref 10–15)
GFR SERPL CREATININE-BSD FRML MDRD: 64 ML/MIN/{1.73_M2}
GLUCOSE SERPL-MCNC: 133 MG/DL (ref 70–99)
HCT VFR BLD AUTO: 24.6 % (ref 40–53)
HGB BLD-MCNC: 8.3 G/DL (ref 13.3–17.7)
INR PPP: 1.4 (ref 0.86–1.14)
LACTATE BLD-SCNC: 1.6 MMOL/L (ref 0.7–2)
LACTATE BLD-SCNC: 2.3 MMOL/L (ref 0.7–2)
LYMPHOCYTES # BLD AUTO: 0.9 10E9/L (ref 0.8–5.3)
LYMPHOCYTES NFR BLD AUTO: 15.7 %
MCH RBC QN AUTO: 35.5 PG (ref 26.5–33)
MCHC RBC AUTO-ENTMCNC: 33.7 G/DL (ref 31.5–36.5)
MCV RBC AUTO: 105 FL (ref 78–100)
METAMYELOCYTES # BLD: 0.1 10E9/L
METAMYELOCYTES NFR BLD MANUAL: 0.9 %
MONOCYTES # BLD AUTO: 0.2 10E9/L (ref 0–1.3)
MONOCYTES NFR BLD AUTO: 4.3 %
NEUTROPHILS # BLD AUTO: 4.1 10E9/L (ref 1.6–8.3)
NEUTROPHILS NFR BLD AUTO: 71.3 %
OVALOCYTES BLD QL SMEAR: ABNORMAL
PLATELET # BLD AUTO: 6 10E9/L (ref 150–450)
PLATELET # BLD EST: ABNORMAL 10*3/UL
POIKILOCYTOSIS BLD QL SMEAR: ABNORMAL
POTASSIUM SERPL-SCNC: 3.7 MMOL/L (ref 3.4–5.3)
RBC # BLD AUTO: 2.34 10E12/L (ref 4.4–5.9)
SODIUM SERPL-SCNC: 134 MMOL/L (ref 133–144)
TRANSFUSION STATUS PATIENT QL: NORMAL
TRANSFUSION STATUS PATIENT QL: NORMAL
WBC # BLD AUTO: 5.8 10E9/L (ref 4–11)

## 2019-11-09 PROCEDURE — 83605 ASSAY OF LACTIC ACID: CPT | Performed by: INTERNAL MEDICINE

## 2019-11-09 PROCEDURE — 74177 CT ABD & PELVIS W/CONTRAST: CPT

## 2019-11-09 PROCEDURE — 71260 CT THORAX DX C+: CPT

## 2019-11-09 PROCEDURE — 80048 BASIC METABOLIC PNL TOTAL CA: CPT | Performed by: INTERNAL MEDICINE

## 2019-11-09 PROCEDURE — 85730 THROMBOPLASTIN TIME PARTIAL: CPT | Performed by: INTERNAL MEDICINE

## 2019-11-09 PROCEDURE — 36415 COLL VENOUS BLD VENIPUNCTURE: CPT | Performed by: INTERNAL MEDICINE

## 2019-11-09 PROCEDURE — 20600000 ZZH R&B BMT

## 2019-11-09 PROCEDURE — 87040 BLOOD CULTURE FOR BACTERIA: CPT | Performed by: INTERNAL MEDICINE

## 2019-11-09 PROCEDURE — 85610 PROTHROMBIN TIME: CPT | Performed by: INTERNAL MEDICINE

## 2019-11-09 PROCEDURE — 25000128 H RX IP 250 OP 636: Performed by: STUDENT IN AN ORGANIZED HEALTH CARE EDUCATION/TRAINING PROGRAM

## 2019-11-09 PROCEDURE — 25000132 ZZH RX MED GY IP 250 OP 250 PS 637: Performed by: INTERNAL MEDICINE

## 2019-11-09 PROCEDURE — 25000128 H RX IP 250 OP 636: Performed by: INTERNAL MEDICINE

## 2019-11-09 PROCEDURE — P9037 PLATE PHERES LEUKOREDU IRRAD: HCPCS | Performed by: NURSE PRACTITIONER

## 2019-11-09 PROCEDURE — 25800030 ZZH RX IP 258 OP 636: Performed by: INTERNAL MEDICINE

## 2019-11-09 PROCEDURE — 86038 ANTINUCLEAR ANTIBODIES: CPT | Performed by: INTERNAL MEDICINE

## 2019-11-09 PROCEDURE — 85025 COMPLETE CBC W/AUTO DIFF WBC: CPT | Performed by: INTERNAL MEDICINE

## 2019-11-09 PROCEDURE — 86431 RHEUMATOID FACTOR QUANT: CPT | Performed by: INTERNAL MEDICINE

## 2019-11-09 RX ORDER — PREDNISONE 20 MG/1
40 TABLET ORAL DAILY
Status: DISCONTINUED | OUTPATIENT
Start: 2019-11-10 | End: 2019-11-10

## 2019-11-09 RX ORDER — PROCHLORPERAZINE MALEATE 5 MG
5-10 TABLET ORAL EVERY 6 HOURS PRN
Status: DISCONTINUED | OUTPATIENT
Start: 2019-11-09 | End: 2019-11-17 | Stop reason: HOSPADM

## 2019-11-09 RX ORDER — ACETAMINOPHEN 325 MG/1
325-650 TABLET ORAL EVERY 4 HOURS PRN
Status: DISCONTINUED | OUTPATIENT
Start: 2019-11-09 | End: 2019-11-17 | Stop reason: HOSPADM

## 2019-11-09 RX ORDER — IOPAMIDOL 755 MG/ML
104 INJECTION, SOLUTION INTRAVASCULAR ONCE
Status: COMPLETED | OUTPATIENT
Start: 2019-11-09 | End: 2019-11-09

## 2019-11-09 RX ORDER — SODIUM CHLORIDE 9 MG/ML
INJECTION, SOLUTION INTRAVENOUS CONTINUOUS
Status: DISCONTINUED | OUTPATIENT
Start: 2019-11-09 | End: 2019-11-17 | Stop reason: HOSPADM

## 2019-11-09 RX ORDER — PAROXETINE 20 MG/1
20 TABLET, FILM COATED ORAL EVERY MORNING
Status: DISCONTINUED | OUTPATIENT
Start: 2019-11-10 | End: 2019-11-17 | Stop reason: HOSPADM

## 2019-11-09 RX ORDER — ACETAMINOPHEN 325 MG/1
650 TABLET ORAL EVERY 4 HOURS PRN
Status: DISCONTINUED | OUTPATIENT
Start: 2019-11-09 | End: 2019-11-09

## 2019-11-09 RX ORDER — MAGNESIUM SULFATE HEPTAHYDRATE 40 MG/ML
4 INJECTION, SOLUTION INTRAVENOUS EVERY 4 HOURS PRN
Status: DISCONTINUED | OUTPATIENT
Start: 2019-11-09 | End: 2019-11-17 | Stop reason: HOSPADM

## 2019-11-09 RX ORDER — POTASSIUM CHLORIDE 1.5 G/1.58G
20-40 POWDER, FOR SOLUTION ORAL
Status: DISCONTINUED | OUTPATIENT
Start: 2019-11-09 | End: 2019-11-17 | Stop reason: HOSPADM

## 2019-11-09 RX ORDER — ITRACONAZOLE 100 MG/1
200 CAPSULE ORAL 2 TIMES DAILY
Status: DISCONTINUED | OUTPATIENT
Start: 2019-11-09 | End: 2019-11-17 | Stop reason: HOSPADM

## 2019-11-09 RX ORDER — POTASSIUM CL/LIDO/0.9 % NACL 10MEQ/0.1L
10 INTRAVENOUS SOLUTION, PIGGYBACK (ML) INTRAVENOUS
Status: DISCONTINUED | OUTPATIENT
Start: 2019-11-09 | End: 2019-11-17 | Stop reason: HOSPADM

## 2019-11-09 RX ORDER — POLYETHYLENE GLYCOL 3350 17 G/17G
17 POWDER, FOR SOLUTION ORAL DAILY PRN
Status: DISCONTINUED | OUTPATIENT
Start: 2019-11-09 | End: 2019-11-17 | Stop reason: HOSPADM

## 2019-11-09 RX ORDER — POTASSIUM CHLORIDE 750 MG/1
20-40 TABLET, EXTENDED RELEASE ORAL
Status: DISCONTINUED | OUTPATIENT
Start: 2019-11-09 | End: 2019-11-17 | Stop reason: HOSPADM

## 2019-11-09 RX ORDER — CELECOXIB 100 MG/1
100 CAPSULE ORAL 2 TIMES DAILY PRN
Status: DISCONTINUED | OUTPATIENT
Start: 2019-11-09 | End: 2019-11-09

## 2019-11-09 RX ORDER — NALOXONE HYDROCHLORIDE 0.4 MG/ML
.1-.4 INJECTION, SOLUTION INTRAMUSCULAR; INTRAVENOUS; SUBCUTANEOUS
Status: DISCONTINUED | OUTPATIENT
Start: 2019-11-09 | End: 2019-11-15

## 2019-11-09 RX ORDER — MEPERIDINE HYDROCHLORIDE 25 MG/ML
12.5 INJECTION INTRAMUSCULAR; INTRAVENOUS; SUBCUTANEOUS
Status: DISCONTINUED | OUTPATIENT
Start: 2019-11-09 | End: 2019-11-17 | Stop reason: HOSPADM

## 2019-11-09 RX ORDER — POTASSIUM CHLORIDE 7.45 MG/ML
10 INJECTION INTRAVENOUS
Status: DISCONTINUED | OUTPATIENT
Start: 2019-11-09 | End: 2019-11-17 | Stop reason: HOSPADM

## 2019-11-09 RX ORDER — CELECOXIB 50 MG/1
50 CAPSULE ORAL 2 TIMES DAILY PRN
Status: DISCONTINUED | OUTPATIENT
Start: 2019-11-09 | End: 2019-11-10

## 2019-11-09 RX ORDER — ACYCLOVIR 800 MG/1
800 TABLET ORAL 2 TIMES DAILY
Status: DISCONTINUED | OUTPATIENT
Start: 2019-11-09 | End: 2019-11-17 | Stop reason: HOSPADM

## 2019-11-09 RX ORDER — POTASSIUM CHLORIDE 29.8 MG/ML
20 INJECTION INTRAVENOUS
Status: DISCONTINUED | OUTPATIENT
Start: 2019-11-09 | End: 2019-11-17 | Stop reason: HOSPADM

## 2019-11-09 RX ADMIN — SODIUM CHLORIDE: 9 INJECTION, SOLUTION INTRAVENOUS at 16:10

## 2019-11-09 RX ADMIN — ACETAMINOPHEN 650 MG: 325 TABLET, FILM COATED ORAL at 18:30

## 2019-11-09 RX ADMIN — CEFEPIME 2 G: 2 INJECTION, POWDER, FOR SOLUTION INTRAVENOUS at 16:12

## 2019-11-09 RX ADMIN — ACETAMINOPHEN 650 MG: 325 TABLET, FILM COATED ORAL at 14:31

## 2019-11-09 RX ADMIN — MEPERIDINE HYDROCHLORIDE 12.5 MG: 25 INJECTION INTRAMUSCULAR; INTRAVENOUS; SUBCUTANEOUS at 18:52

## 2019-11-09 RX ADMIN — ITRACONAZOLE 200 MG: 100 CAPSULE ORAL at 19:43

## 2019-11-09 RX ADMIN — ACYCLOVIR 800 MG: 800 TABLET ORAL at 19:43

## 2019-11-09 RX ADMIN — IOPAMIDOL 104 ML: 755 INJECTION, SOLUTION INTRAVENOUS at 15:19

## 2019-11-09 RX ADMIN — CEFEPIME 2 G: 2 INJECTION, POWDER, FOR SOLUTION INTRAVENOUS at 23:37

## 2019-11-09 ASSESSMENT — PAIN SCALES - GENERAL: PAINLEVEL: NO PAIN (0)

## 2019-11-09 ASSESSMENT — ACTIVITIES OF DAILY LIVING (ADL)
ADLS_ACUITY_SCORE: 11
ADLS_ACUITY_SCORE: 11

## 2019-11-09 NOTE — PROGRESS NOTES
BMT Clinic Note      Mr. 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 S/p 2 recent readmission for FUO.      HPI: Returns for day +175 bone marrow biopsy and follow-up.  He called in for fevers which has been going on for the past 3 months and has had multiple admissions for the same without any definitive diagnosis as yet.    He now had fevers on Wed, Thu and Fri and has taken tylenol with resolution of fevers. He has a cough when he gets fevers but overall he looks well. I did Blood Cx, U/A, CXR- and all of them are unremarkable     ROS: as above.     Physical Exam:   Blood pressure 115/72, pulse 95, temperature 98.4  F (36.9  C), temperature source Oral, resp. rate 16, weight 77 kg (169 lb 11.2 oz), SpO2 98 %.    Wt Readings from Last 4 Encounters:   11/09/19 77 kg (169 lb 11.2 oz)   11/08/19 76.8 kg (169 lb 4.8 oz)   11/06/19 76.6 kg (168 lb 14.4 oz)   11/01/19 77.5 kg (170 lb 14.4 oz)       General: NAD, talkative.  Eyes:  FARRAH, sclera anicteric   Neck: no LAD appreciated; no asymmetry, masses or lumps.  Skin: no rash      Labs:  Lab Results   Component Value Date    WBC 5.8 11/09/2019    ANEU 4.1 11/09/2019    HGB 8.3 (L) 11/09/2019    HCT 24.6 (L) 11/09/2019    PLT 6 (LL) 11/09/2019     11/09/2019    POTASSIUM 3.7 11/09/2019    CHLORIDE 102 11/09/2019    CO2 25 11/09/2019     (H) 11/09/2019    BUN 16 11/09/2019    CR 1.21 11/09/2019    MAG 2.2 11/06/2019    INR 1.27 (H) 10/16/2019    BILITOTAL 0.8 11/06/2019    AST 14 11/06/2019    ALT 24 11/06/2019    ALKPHOS 124 11/06/2019    PROTTOTAL 7.5 11/06/2019    ALBUMIN 3.7 11/06/2019       Assessment and Plan:   Angel Yanez is a 60 yo man D+173 s/p 2nd autologous transplant for MM. S/p 2 recent readmissions for FUO (10/16-10/23; 9/23-9/29/19).     I have reviewed his chart to a reasonable extent.    I am now suspecting the cause of his fevers may be autoimmune/inflammatory ?psoriatic arthritis. Extensive  investigations has not revealed a source at this time. He also looks well and is asymptomatic except for the cough which seems to be chronic.We are pending Blood cx, UA and CXR from today.    His BM shows adequate granulo and erythropoiesis but his platelet precursorts are 0 to absent which is reflected in his peripheral CBC. We might have to consider thrombopoietin agonists.     I would do a  -CT C/A/P to r/o infections  -Cefepime  -BINU, RF  -Start prednisone 40 mg po tomorrow for 3 days for autoimmune disorders    Elia RAMOS MS  Attending Physician  Pager - [phone]  Email - [email or web address]

## 2019-11-09 NOTE — NURSING NOTE
"Oncology Rooming Note    November 9, 2019 11:39 AM   Angel Yanez is a 59 year old male who presents for:    Chief Complaint   Patient presents with     Blood Draw     Labs drawn via PIV by RN in lab. VS taken.      Initial Vitals: /72   Pulse 95   Temp 98.4  F (36.9  C) (Oral)   Resp 16   Wt 77 kg (169 lb 11.2 oz)   SpO2 98%   BMI 23.80 kg/m   Estimated body mass index is 23.8 kg/m  as calculated from the following:    Height as of 10/29/19: 1.798 m (5' 10.8\").    Weight as of this encounter: 77 kg (169 lb 11.2 oz). Body surface area is 1.96 meters squared.  No Pain (0) Comment: Data Unavailable   No LMP for male patient.  Allergies reviewed: Yes  Medications reviewed: Yes    Medications: Medication refills not needed today.  Pharmacy name entered into EPIC:    [facility] - [place] - [address] [facility] - [place], [place] - [address]    Clinical concerns: t max 103.+ last night, normal temp now, md aware       Eloisa King, RN              "

## 2019-11-09 NOTE — NURSING NOTE
Chief Complaint   Patient presents with     Blood Draw     Labs drawn via PIV by RN in lab. VS taken.      Labs drawn via peripheral IV. Vital signs taken. Checked into next appointment.   Renee Holbrook RN

## 2019-11-09 NOTE — PROGRESS NOTES
Sepsis Evaluation Progress Note    I was called to see Angel Yanez due to abnormal vital signs triggering the Sepsis SIRS screening alert. He is not known to have an infection.     Physical Exam   Vital Signs:  Temp: 103.2  F (39.6  C) Temp src: Oral BP: 133/73 Pulse: 102   Resp: 16 SpO2: 91 % O2 Device: None (Room air)      Lab:  Lactic Acid   Date Value Ref Range Status   10/20/2019 1.7 0.7 - 2.0 mmol/L Final     Lactate for Sepsis Protocol   Date Value Ref Range Status   10/19/2019 1.6 0.7 - 2.0 mmol/L Final       The patient is at baseline mental status. The rest of their physical exam is unchanged.     Assessment & Plan   NO EVIDENCE OF SEPSIS at this time.  Vital sign, physical exam, and lab findings are likely due to recurrent FUO.   - NS bolus 500 ml x 1.  - Follow CT CAP. Follow BCx2.   - Started cefepime.    Disposition: The patient will remain on the current unit. We will continue to monitor this patient closely.  David Galdamez MD    Sepsis Criteria   Sepsis: 2+ SIRS criteria due to infection  Severe Sepsis: Sepsis AND 1+ new sign of acute organ dysfunction (Note: lactate >2 is organ dysfunction)  Septic Shock: Sepsis AND hypotension despite volume resuscitation with 30 ml/kg crystalloid

## 2019-11-09 NOTE — PLAN OF CARE
"Vital signs:  Temp: 101.5  F (38.6  C) Temp src: Oral BP: (!) 154/73 Pulse: 106   Resp: 16 SpO2: 94 %          Estimated body mass index is 23.8 kg/m  as calculated from the following:    Height as of 10/29/19: 1.798 m (5' 10.8\").    Weight as of an earlier encounter on 11/9/19: 77 kg (169 lb 11.2 oz).    Patient admitted to:  5C  Admitted from: Clinic  Arrived by:  Walked to unit by himself  Reason for admission: Fevers  Patient accompanied by: Self  Belongings: In room  Teaching:  Lactic acid triggered - team aware - tylenol given - waiting for further orders      Problem: Adult Inpatient Plan of Care  Goal: Plan of Care Review  Flowsheets (Taken 11/9/2019 7554)  Plan of Care Reviewed With: patient  Progress: no change     "

## 2019-11-09 NOTE — PROGRESS NOTES
Heme/Onc History and Physical    Angel Yanez MRN# [id number]   Age: 59 year old YOB: 1960     Date of Admission:  11/9/2019    Primary care provider: Ayana Love          Assessment and Plan:     Assessment and Plan:   Angel Yanez is a 58 yo man D+176 s/p 2nd autologous transplant for MM. S/p 2 recent readmissions for FUO (10/16-10/23; 9/23-9/29/19).      1.  BMT/MM:   Slow engraftment; cell dose was only 0.639x10^6. (Marrow Zachary - known poor cell dose as failed chemo-mobilization 1/2019.)   - Stringent CR.  - day +180 bone marrow biopsy 11/6 which showed no morphologic or immunophenotypic evidence of plasma cell neoplasm.      2.  HEME: Keep Hgb>8g/dL, plt >10-20 (epistaxis requiring rhinorocket packing in last admission).   - anemic, thrombocytopenic post BMT, low cell dose, +/- infection.   - intermittent neutropenia: GCSF last on 11/6; give prn ANC </=1000.   - No evidence of HLH on lymphnode bx 10/18/19.     - Persistent thrombocytopenia: BM 11/6/19 showed adequate granulopoiesis and erythropoiesis but his platelet precursors were 0 to absent which is reflected in peripheral CBC. Consider thrombopoietin agonists.   - Patient did receive one unit of plts in the clinic prior to transfer and was given additional unit of plts in the hospital on 11/9.       3.  ID: Afebrile.  Persistent fevers: extensive ID work up unremarkable. 10/16 repeat chest CT: persistent mediastinal adenopathy no paranchymal disease. S/P bx of L axillary node on 10/18: No evidence of HLH, tissues looks reactive.  - Per ID, possible histoplasmosis: empiric itraconazole 200 TID started last admission with fevers initially resolved but now recurred; currently on bid dosing. Pt declining recommened ID follow-up in December. 10/18 Histo/blasto urine antigen negative. Per Dr Kelley flucaonzole can suppress growth? 10/18 [place]us DNA test- negative.   - + Micrococcus from 10/20 blood culture:  Treat with oral levaquin  500mg daily x 7d (11/1-11/7).    - Per Dr. Jones, his recurring fevers might be autoimmune/inflammatory in nature.   - Per the outpatient plan, ordered CT CAP to rule out infectious process. Started cefepime on admission. Ordered BINU and RF. Start prednisone 40 mg PO tomorrow x 3 days for possible autoimmune etiology. Celecoxib 50 mg BID PRN for fevers (platelet sparing NSAID) since it works better than APAP per patient request.   - May require prophy levaquin after treatment course stops with recurrent neutropenia.   - prophy ACV, Pentamidine (last 10/17/19)  - On CMV vaccine trial.   - CMV neg and EBV 2725 (10/16)     4. Pulm:   - 10/17: Echo with preserved EF, no evidence of right heart strain.  - 10/17 VQ scan neg for PE.     5. GI:    - Patient currently not on GI prophylaxis.  - LFTs are okay.     5.  FEN/Renal:   - Cr slightly increased to 1.21 compared to 0.96 on 11/8/19.  - IVF NS 75 ml/hr.  - Lyte replacement per scheduled protocol.       6.  Mood: Continue Paxil.      FEN  - Regular diet as tolerated  - NS at 75 ml/hr  - PRN lyte replacement per standing protocol      Prophylaxis   - No pharmacologic VTE ppx due to TCP  - PRN bowel regimen for constipation ppx  - PPI for GI/PUD ppx  - PT     Code Status: FULL    Disposition: Inpatient admission to BMT service      Rudolph Hernandez  11/09/2019              Chief Complaint:   FUO         History of Present Illness:   Angel Yanez is a 58 yo man D+176 s/p 2nd autologous transplant for MM. S/p 2 recent readmissions for FUO (10/16-10/23; 9/23-9/29/19).     After his last discharge from [facility] on 9/29/19, patient was relatively afebrile until Wednesday. He started to spike fevers again since Wednesday. This was especially bad last night when his Tmax was 103.6.     In terms of his symptoms, he continues to have dry cough (associated with dry heaves) which is a chronic problem for the patient. No new localizing signs or symptoms. No headache, CP, SOB, sputum,  abdominal pain, N/V, diarrhea or urinary symptoms. No skin rashes. He complains of increased fatigue and SOB which is also not new and was present on the last admission per the patient. His appetite is not very good.            Review of Systems:     14-point review of systems was otherwise negative except as noted in HPI.          Past Medical History:   Past medical history was reviewed.   Past Medical History:   Diagnosis Date     GERD (gastroesophageal reflux disease)      H/O autologous stem cell transplant (H) 02/2005     Hyperlipidemia      Multiple myeloma (H) 2004     PONV (postoperative nausea and vomiting)      Psoriasis      Psoriatic arthritis (H)              Past Surgical History:   Past surgical history was reviewed.   Past Surgical History:   Procedure Laterality Date     ARTHROPLASTY HIP       COLONOSCOPY       HERNIA REPAIR       IR CVC TUNNEL PLACEMENT > 5 YRS OF AGE  1/22/2019     IR CVC TUNNEL PLACEMENT > 5 YRS OF AGE  5/16/2019     IR CVC TUNNEL REMOVAL LEFT  2/20/2019     IR CVC TUNNEL REMOVAL RIGHT  7/23/2019     IR FOLLOW UP VISIT OUTPATIENT  1/24/2019     IR LYMPH NODE BIOPSY  10/18/2019     ORTHOPEDIC SURGERY       PROCURE BONE MARROW N/A 5/14/2019    Procedure: Bone Marrow [place];  Surgeon: Antwan Erickson MD;  Location: [facility] OR     TRANSPLANT               Social History:   Social history was reviewed.   Social History     Tobacco Use     Smoking status: Former Smoker     Smokeless tobacco: Never Used   Substance Use Topics     Alcohol use: Yes     Comment: occasionaly             Family History:   Family history was reviewed.  Family History   Problem Relation Age of Onset     Diabetes Mother      Cerebrovascular Disease Mother      Leukemia Father              Allergies:     Allergies   Allergen Reactions     Avalide Hives     Chlorhexidine Itching     Chloroxylenol Rash     Technicare solution     Lorazepam Hives     Moxifloxacin Hives             Medications:   Medications  Reviewed.   Current Facility-Administered Medications   Medication     acetaminophen (TYLENOL) tablet 650 mg             Physical Exam:   Vitals were reviewed.  Blood pressure (!) 154/73, pulse 106, temperature 101.5  F (38.6  C), temperature source Oral, resp. rate 16, SpO2 94 %.    Constitutional: awake, laying in bed, appears comfortable, in NAD  HEENT: MMM, EOM intact, sclerae clear and anicteric  Heart: RRR, no murmurs appreciated  Resp: Clear to auscultation bilaterally, breathing comfortably on RA, no wheezes, rhonchi  Abd: Soft, non-tender, BS+, no masses appreciated  MSK:  Warm, FROM, no joint swelling  Skin: no rash or lesions on limited exam  Neuro: CN2-12 grossly intact, no lateralizing symptoms or focal neurologic deficits         Data:   No intake/output data recorded.    ROUTINE LABS (Last four results)  CMP  Recent Labs   Lab 11/09/19  1112 11/08/19  1043 11/06/19  1051    136 139   POTASSIUM 3.7 4.0 3.9   CHLORIDE 102 104 107   CO2 25 25 27   ANIONGAP 8 8 6   * 118* 92   BUN 16 10 12   CR 1.21 0.96 0.81   GFRESTIMATED 64 85 >90   GFRESTBLACK 74 >90 >90   ZHEN 8.8 8.6 9.0   MAG  --   --  2.2   PHOS  --   --  3.5   PROTTOTAL  --   --  7.5   ALBUMIN  --   --  3.7   BILITOTAL  --   --  0.8   ALKPHOS  --   --  124   AST  --   --  14   ALT  --   --  24     CBC  Recent Labs   Lab 11/09/19  1112 11/08/19  1043 11/06/19  1051   WBC 5.8 5.7 2.0*   RBC 2.34* 2.29* 2.59*   HGB 8.3* 8.0* 9.1*   HCT 24.6* 23.9* 27.3*   * 104* 105*   MCH 35.5* 34.9* 35.1*   MCHC 33.7 33.5 33.3   RDW 20.7* 19.9* 18.9*   PLT 6* 12* 17*     INRNo lab results found in last 7 days.  Arterial Blood GasNo lab results found in last 7 days.

## 2019-11-10 LAB
ALBUMIN SERPL-MCNC: 2.6 G/DL (ref 3.4–5)
ALBUMIN UR-MCNC: 30 MG/DL
ALP SERPL-CCNC: 101 U/L (ref 40–150)
ALT SERPL W P-5'-P-CCNC: 17 U/L (ref 0–70)
ANION GAP SERPL CALCULATED.3IONS-SCNC: 6 MMOL/L (ref 3–14)
ANISOCYTOSIS BLD QL SMEAR: ABNORMAL
APPEARANCE UR: CLEAR
AST SERPL W P-5'-P-CCNC: 10 U/L (ref 0–45)
BASOPHILS # BLD AUTO: 0 10E9/L (ref 0–0.2)
BASOPHILS NFR BLD AUTO: 0 %
BILIRUB DIRECT SERPL-MCNC: 0.3 MG/DL (ref 0–0.2)
BILIRUB SERPL-MCNC: 0.8 MG/DL (ref 0.2–1.3)
BILIRUB UR QL STRIP: NEGATIVE
BLD PROD TYP BPU: NORMAL
BLD UNIT ID BPU: 0
BLOOD PRODUCT CODE: NORMAL
BPU ID: NORMAL
BUN SERPL-MCNC: 18 MG/DL (ref 7–30)
BURR CELLS BLD QL SMEAR: SLIGHT
CALCIUM SERPL-MCNC: 8 MG/DL (ref 8.5–10.1)
CHLORIDE SERPL-SCNC: 104 MMOL/L (ref 94–109)
CO2 SERPL-SCNC: 25 MMOL/L (ref 20–32)
COLOR UR AUTO: YELLOW
CREAT SERPL-MCNC: 0.99 MG/DL (ref 0.66–1.25)
CRP SERPL-MCNC: 220 MG/L (ref 0–8)
DAT POLY-SP REAG RBC QL: NORMAL
DIFFERENTIAL METHOD BLD: ABNORMAL
EOSINOPHIL # BLD AUTO: 0.1 10E9/L (ref 0–0.7)
EOSINOPHIL # BLD AUTO: 0.1 10E9/L (ref 0–0.7)
EOSINOPHIL # BLD AUTO: 0.2 10E9/L (ref 0–0.7)
EOSINOPHIL NFR BLD AUTO: 2.6 %
EOSINOPHIL NFR BLD AUTO: 3.6 %
EOSINOPHIL NFR BLD AUTO: 6.9 %
ERYTHROCYTE [DISTWIDTH] IN BLOOD BY AUTOMATED COUNT: 21.2 % (ref 10–15)
ERYTHROCYTE [DISTWIDTH] IN BLOOD BY AUTOMATED COUNT: 21.3 % (ref 10–15)
ERYTHROCYTE [DISTWIDTH] IN BLOOD BY AUTOMATED COUNT: 22.4 % (ref 10–15)
ERYTHROCYTE [SEDIMENTATION RATE] IN BLOOD BY WESTERGREN METHOD: 113 MM/H (ref 0–20)
GFR SERPL CREATININE-BSD FRML MDRD: 82 ML/MIN/{1.73_M2}
GLUCOSE SERPL-MCNC: 144 MG/DL (ref 70–99)
GLUCOSE UR STRIP-MCNC: NEGATIVE MG/DL
HCT VFR BLD AUTO: 19.7 % (ref 40–53)
HCT VFR BLD AUTO: 21 % (ref 40–53)
HCT VFR BLD AUTO: 22.3 % (ref 40–53)
HGB BLD-MCNC: 6.6 G/DL (ref 13.3–17.7)
HGB BLD-MCNC: 6.8 G/DL (ref 13.3–17.7)
HGB BLD-MCNC: 7.3 G/DL (ref 13.3–17.7)
HGB UR QL STRIP: ABNORMAL
KETONES UR STRIP-MCNC: 10 MG/DL
LDH SERPL L TO P-CCNC: 282 U/L (ref 85–227)
LEUKOCYTE ESTERASE UR QL STRIP: NEGATIVE
LYMPHOCYTES # BLD AUTO: 0.1 10E9/L (ref 0.8–5.3)
LYMPHOCYTES # BLD AUTO: 0.1 10E9/L (ref 0.8–5.3)
LYMPHOCYTES # BLD AUTO: 0.2 10E9/L (ref 0.8–5.3)
LYMPHOCYTES NFR BLD AUTO: 10.7 %
LYMPHOCYTES NFR BLD AUTO: 3.4 %
LYMPHOCYTES NFR BLD AUTO: 5.3 %
MACROCYTES BLD QL SMEAR: PRESENT
MACROCYTES BLD QL SMEAR: PRESENT
MCH RBC QN AUTO: 33.5 PG (ref 26.5–33)
MCH RBC QN AUTO: 34.2 PG (ref 26.5–33)
MCH RBC QN AUTO: 35.3 PG (ref 26.5–33)
MCHC RBC AUTO-ENTMCNC: 32.4 G/DL (ref 31.5–36.5)
MCHC RBC AUTO-ENTMCNC: 32.7 G/DL (ref 31.5–36.5)
MCHC RBC AUTO-ENTMCNC: 33.5 G/DL (ref 31.5–36.5)
MCV RBC AUTO: 102 FL (ref 78–100)
MCV RBC AUTO: 105 FL (ref 78–100)
MCV RBC AUTO: 106 FL (ref 78–100)
METAMYELOCYTES # BLD: 0 10E9/L
METAMYELOCYTES NFR BLD MANUAL: 1.7 %
MONOCYTES # BLD AUTO: 0 10E9/L (ref 0–1.3)
MONOCYTES # BLD AUTO: 0.1 10E9/L (ref 0–1.3)
MONOCYTES # BLD AUTO: 0.1 10E9/L (ref 0–1.3)
MONOCYTES NFR BLD AUTO: 1.7 %
MONOCYTES NFR BLD AUTO: 3.6 %
MONOCYTES NFR BLD AUTO: 5.3 %
MUCOUS THREADS #/AREA URNS LPF: PRESENT /LPF
NEUTROPHILS # BLD AUTO: 1.5 10E9/L (ref 1.6–8.3)
NEUTROPHILS # BLD AUTO: 1.9 10E9/L (ref 1.6–8.3)
NEUTROPHILS # BLD AUTO: 2.4 10E9/L (ref 1.6–8.3)
NEUTROPHILS NFR BLD AUTO: 82.1 %
NEUTROPHILS NFR BLD AUTO: 86.3 %
NEUTROPHILS NFR BLD AUTO: 86.8 %
NITRATE UR QL: NEGATIVE
NUM BPU REQUESTED: 1
NUM BPU REQUESTED: 1
PH UR STRIP: 6 PH (ref 5–7)
PLATELET # BLD AUTO: 13 10E9/L (ref 150–450)
PLATELET # BLD AUTO: 17 10E9/L (ref 150–450)
PLATELET # BLD AUTO: 8 10E9/L (ref 150–450)
PLATELET # BLD EST: ABNORMAL 10*3/UL
POIKILOCYTOSIS BLD QL SMEAR: SLIGHT
POLYCHROMASIA BLD QL SMEAR: SLIGHT
POTASSIUM SERPL-SCNC: 3.4 MMOL/L (ref 3.4–5.3)
PROT SERPL-MCNC: 6 G/DL (ref 6.8–8.8)
RBC # BLD AUTO: 1.87 10E12/L (ref 4.4–5.9)
RBC # BLD AUTO: 1.99 10E12/L (ref 4.4–5.9)
RBC # BLD AUTO: 2.18 10E12/L (ref 4.4–5.9)
RBC #/AREA URNS AUTO: 0 /HPF (ref 0–2)
RETICS # AUTO: 22.1 10E9/L (ref 25–95)
RETICS # AUTO: 22.7 10E9/L (ref 25–95)
RETICS/RBC NFR AUTO: 1.2 % (ref 0.5–2)
RETICS/RBC NFR AUTO: 1.2 % (ref 0.5–2)
SODIUM SERPL-SCNC: 134 MMOL/L (ref 133–144)
SOURCE: ABNORMAL
SP GR UR STRIP: 1.02 (ref 1–1.03)
TRANSFUSION STATUS PATIENT QL: NORMAL
UROBILINOGEN UR STRIP-MCNC: NORMAL MG/DL (ref 0–2)
WBC # BLD AUTO: 1.8 10E9/L (ref 4–11)
WBC # BLD AUTO: 2.2 10E9/L (ref 4–11)
WBC # BLD AUTO: 2.8 10E9/L (ref 4–11)
WBC #/AREA URNS AUTO: 2 /HPF (ref 0–5)

## 2019-11-10 PROCEDURE — 83615 LACTATE (LD) (LDH) ENZYME: CPT | Performed by: INTERNAL MEDICINE

## 2019-11-10 PROCEDURE — 25000125 ZZHC RX 250

## 2019-11-10 PROCEDURE — 36415 COLL VENOUS BLD VENIPUNCTURE: CPT | Performed by: INTERNAL MEDICINE

## 2019-11-10 PROCEDURE — 81001 URINALYSIS AUTO W/SCOPE: CPT | Performed by: INTERNAL MEDICINE

## 2019-11-10 PROCEDURE — 36415 COLL VENOUS BLD VENIPUNCTURE: CPT | Performed by: PHYSICIAN ASSISTANT

## 2019-11-10 PROCEDURE — 87103 BLOOD FUNGUS CULTURE: CPT | Performed by: INTERNAL MEDICINE

## 2019-11-10 PROCEDURE — 25000132 ZZH RX MED GY IP 250 OP 250 PS 637: Performed by: INTERNAL MEDICINE

## 2019-11-10 PROCEDURE — 83010 ASSAY OF HAPTOGLOBIN QUANT: CPT | Performed by: INTERNAL MEDICINE

## 2019-11-10 PROCEDURE — P9073 PLATELETS PHERESIS PATH REDU: HCPCS | Performed by: INTERNAL MEDICINE

## 2019-11-10 PROCEDURE — 86200 CCP ANTIBODY: CPT | Performed by: INTERNAL MEDICINE

## 2019-11-10 PROCEDURE — 86880 COOMBS TEST DIRECT: CPT | Performed by: PHYSICIAN ASSISTANT

## 2019-11-10 PROCEDURE — 25000128 H RX IP 250 OP 636: Performed by: PHYSICIAN ASSISTANT

## 2019-11-10 PROCEDURE — 40000611 ZZHCL STATISTIC MORPHOLOGY W/INTERP HEMEPATH TC 85060: Performed by: PHYSICIAN ASSISTANT

## 2019-11-10 PROCEDURE — 86618 LYME DISEASE ANTIBODY: CPT | Performed by: INTERNAL MEDICINE

## 2019-11-10 PROCEDURE — 86140 C-REACTIVE PROTEIN: CPT | Performed by: INTERNAL MEDICINE

## 2019-11-10 PROCEDURE — 85045 AUTOMATED RETICULOCYTE COUNT: CPT | Performed by: INTERNAL MEDICINE

## 2019-11-10 PROCEDURE — 80048 BASIC METABOLIC PNL TOTAL CA: CPT | Performed by: INTERNAL MEDICINE

## 2019-11-10 PROCEDURE — 87116 MYCOBACTERIA CULTURE: CPT | Performed by: INTERNAL MEDICINE

## 2019-11-10 PROCEDURE — 25000132 ZZH RX MED GY IP 250 OP 250 PS 637: Performed by: PHYSICIAN ASSISTANT

## 2019-11-10 PROCEDURE — 85652 RBC SED RATE AUTOMATED: CPT | Performed by: INTERNAL MEDICINE

## 2019-11-10 PROCEDURE — 40000893 ZZH STATISTIC PT IP EVAL DEFER

## 2019-11-10 PROCEDURE — 85025 COMPLETE CBC W/AUTO DIFF WBC: CPT | Performed by: INTERNAL MEDICINE

## 2019-11-10 PROCEDURE — 84999 UNLISTED CHEMISTRY PROCEDURE: CPT | Performed by: PHYSICIAN ASSISTANT

## 2019-11-10 PROCEDURE — P9040 RBC LEUKOREDUCED IRRADIATED: HCPCS | Performed by: INTERNAL MEDICINE

## 2019-11-10 PROCEDURE — 87385 HISTOPLASMA CAPSUL AG IA: CPT | Performed by: INTERNAL MEDICINE

## 2019-11-10 PROCEDURE — P9037 PLATE PHERES LEUKOREDU IRRAD: HCPCS | Performed by: INTERNAL MEDICINE

## 2019-11-10 PROCEDURE — 25000128 H RX IP 250 OP 636: Performed by: INTERNAL MEDICINE

## 2019-11-10 PROCEDURE — 80299 QUANTITATIVE ASSAY DRUG: CPT | Performed by: PHYSICIAN ASSISTANT

## 2019-11-10 PROCEDURE — 25000131 ZZH RX MED GY IP 250 OP 636 PS 637: Performed by: INTERNAL MEDICINE

## 2019-11-10 PROCEDURE — 87040 BLOOD CULTURE FOR BACTERIA: CPT | Performed by: INTERNAL MEDICINE

## 2019-11-10 PROCEDURE — 40000556 ZZH STATISTIC PERIPHERAL IV START W US GUIDANCE

## 2019-11-10 PROCEDURE — 86225 DNA ANTIBODY NATIVE: CPT | Performed by: INTERNAL MEDICINE

## 2019-11-10 PROCEDURE — 20600000 ZZH R&B BMT

## 2019-11-10 PROCEDURE — 25000125 ZZHC RX 250: Performed by: PHYSICIAN ASSISTANT

## 2019-11-10 PROCEDURE — 80076 HEPATIC FUNCTION PANEL: CPT | Performed by: INTERNAL MEDICINE

## 2019-11-10 PROCEDURE — 40000275 ZZH STATISTIC RCP TIME EA 10 MIN

## 2019-11-10 RX ORDER — CELECOXIB 50 MG/1
50 CAPSULE ORAL 2 TIMES DAILY
Status: DISCONTINUED | OUTPATIENT
Start: 2019-11-10 | End: 2019-11-11

## 2019-11-10 RX ORDER — ALBUTEROL SULFATE 5 MG/ML
2.5 SOLUTION RESPIRATORY (INHALATION) EVERY 4 HOURS PRN
Status: DISCONTINUED | OUTPATIENT
Start: 2019-11-10 | End: 2019-11-17 | Stop reason: HOSPADM

## 2019-11-10 RX ORDER — ALBUTEROL SULFATE 0.83 MG/ML
SOLUTION RESPIRATORY (INHALATION)
Status: COMPLETED
Start: 2019-11-10 | End: 2019-11-10

## 2019-11-10 RX ADMIN — CEFEPIME 2 G: 2 INJECTION, POWDER, FOR SOLUTION INTRAVENOUS at 15:06

## 2019-11-10 RX ADMIN — ITRACONAZOLE 200 MG: 100 CAPSULE ORAL at 18:14

## 2019-11-10 RX ADMIN — ACETAMINOPHEN 650 MG: 325 TABLET, FILM COATED ORAL at 04:10

## 2019-11-10 RX ADMIN — ELTROMBOPAG OLAMINE 50 MG: 50 TABLET, FILM COATED ORAL at 09:58

## 2019-11-10 RX ADMIN — CELECOXIB 50 MG: 50 CAPSULE ORAL at 20:07

## 2019-11-10 RX ADMIN — ACYCLOVIR 800 MG: 800 TABLET ORAL at 20:07

## 2019-11-10 RX ADMIN — ALBUTEROL SULFATE 2.5 MG: 2.5 SOLUTION RESPIRATORY (INHALATION) at 08:36

## 2019-11-10 RX ADMIN — ACYCLOVIR 800 MG: 800 TABLET ORAL at 07:46

## 2019-11-10 RX ADMIN — ACETAMINOPHEN 650 MG: 325 TABLET, FILM COATED ORAL at 12:09

## 2019-11-10 RX ADMIN — CEFEPIME 2 G: 2 INJECTION, POWDER, FOR SOLUTION INTRAVENOUS at 23:45

## 2019-11-10 RX ADMIN — CELECOXIB 50 MG: 50 CAPSULE ORAL at 09:59

## 2019-11-10 RX ADMIN — PREDNISONE 40 MG: 20 TABLET ORAL at 07:46

## 2019-11-10 RX ADMIN — CEFEPIME 2 G: 2 INJECTION, POWDER, FOR SOLUTION INTRAVENOUS at 06:32

## 2019-11-10 RX ADMIN — ITRACONAZOLE 200 MG: 100 CAPSULE ORAL at 07:46

## 2019-11-10 RX ADMIN — PAROXETINE HYDROCHLORIDE HEMIHYDRATE 20 MG: 20 TABLET, FILM COATED ORAL at 07:46

## 2019-11-10 RX ADMIN — PHYTONADIONE 10 MG: 10 INJECTION, EMULSION INTRAMUSCULAR; INTRAVENOUS; SUBCUTANEOUS at 11:06

## 2019-11-10 ASSESSMENT — ACTIVITIES OF DAILY LIVING (ADL)
ADLS_ACUITY_SCORE: 11

## 2019-11-10 NOTE — PLAN OF CARE
5C Discharge Planner PT   Patient plan for discharge: home   Current status: PT evaluation orders acknowledged and appreciated. Anticipate that the pt will continue to improve activity tolerance when fevers and medical status improves. Ambulated IND in room with therapist - steady on feet. PT encouraged pt to complete 2-3 walks/day in hallways as he feels better to prevent deconditioning. O2 sats remained in high 90s on RA during activity. Pt has no IP PT needs. PT to complete orders.   Barriers to return to prior living situation: medical status  Recommendations for discharge: home  Rationale for recommendations: Pt is safe to return home when he is medically stable.    Thank you for your referral.       Entered by: Louise Victoria 11/10/2019 11:35 AM

## 2019-11-10 NOTE — CONSULTS
[facility]  Transplant Infectious Disease Consult Note:  New Patient     Patient:  Angel Yanez, Date of birth 1960, Medical record number [id number]  Date of Visit:  11/10/2019  Consult requested by Dr. Godwin for evaluation of FUO.         Assessment and Recommendations:   Recommendations:  - Check itraconazole level   - Obtain histoplasma urine and serum Ag, AFB blood culture and fungal blood cultures  - Agree with autoimmune w/u   - Continue with cefepime for now.     Thank you for the consultation. Transplant ID will follow along.    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 Now admitted again with fevers. ID consulted for further w/u.     Infectious Disease issues include:  - Recurrent fevers and diffuse lymphadenopathy: Patient has had multiple recurrent febrile episodes since July 2019. First episode on 7/2019 was 2/2 to PSA CLABSI but the other episodes (8/2019, 9/2019 and 10/2019) no clear etiology was found. On last admission (10/16-10/23), ID was consulted and Dr. Kelley started him on itraconazole for possible disseminated histoplasmosis given the presence of calcifications in the spleen, lung and LAD. Fevers abated and he was discharged home. Per his report, he was doing well until the night after his BM biopsy when he started again with fevers, chills and rigors. Extensive infectious and cancer w/u has been done so far and all negative (see below). Not much to add besides checking an itraconazole level to make sure is therapeutic since if it is indeed disseminated histoplasmosis and drug level is sub therapeutic, this potentially will explain recurrent fevers.   However, seems very coincidental that he spiked a fever again after a procedure (bone marrow biopsy), it  seems to me there must be some inflammatory component to the fevers, specially with CRP in the 200's and ESR in the 100's. No family h/o recurrent fevers and no episodic recurrent fevers in the past.     Old ID issues:  - Micrococcus on blood culture 10/20 - On 8th day of incubation (only from left arm, not from right - treated with levofloxacin from 11/1-11/7)  - EBV viremia, very low grade. No PTLD on LN biopsy.    - Multiple calcified left hilar nodes and splenic granulomas, which would indicate prior histoplasmosis based on living in this part [place] the [place].   - Rhinovirus shedding. 9/24/2019, 7/22/2019, 5/30/2019.   - Hx of port-related Pseudomonas bacteremia 7/21 (Hsieh removed, treated with 10 days of IV cefepime)    Other ID issues:  - QTc interval: 451 on 8/23/19 (pt has allergy to moxifloxacin)  - Pneumocystis prophylaxis: pentamidine 10/17/2019.  - Viral serostatus & prophylaxis: CMV+, EBV+, HSV2+ (seroreversion documented 5/16/2019); acyclovir   - Gamma globulin status: hypogammaglobulinemic. 507 on 9/25/2019. 920 on 10/16/2019.   - Isolation status: Good hand hygiene.    Attestation:  I have reviewed today's vital signs, medications, labs and imaging. I personally reviewed the imaging. Reviewed medical history, surgical history, and family history in Epic History tab. No updates needed to be made.     Courtney Morton MD  Transplant Infectious Diseases   [phone]         History of the Infectious Disease lllness:   This is a 60 yo male with h/o 2nd autologous PBSCT (cytoxan/melphalan) on 5/17/19 for multiple myeloma c/b slow engraftment and prolonged neutropenia, suspected disseminated histoplasmosis on itraconazole and recurrent fevers. Now admitted again with fevers. ID consulted for further w/u.    [facility] was [facility] 10[facility]. He was in his usual state of health, afebrile until Wednesday night (11/6). He had a bone marrow biopsy that morning. T max at home was 103.6 associated with  rigors and chills. He has a long standing dry cough since transplant. Denies HAs, myalgias only with fevers, arthralgias, joint swelling, periorbital edema, n/v, abdominal pain, diarrhea, chest pain, dyspnea or skin rashes. No urinary symptoms. He has poor appetite.       Exposure history: Lives in [place] with wife and a dog. Wife has a  on the first floor but they live on the second floor. He has small grandchildren but has not been around the lately. He had been in [place] in August and went on a 2-3 mile hike. No travel to [place] or [place]. He mowed the lawn but wears a mask. 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 No known TB exposures. No exposures to birds. No recent international travel.     Transplants:  Auto SCT 5/2019    Review of Systems:  CONSTITUTIONAL:  Positive fevers, chills and night sweats.  EYES: negative for icterus  ENT:  negative for hearing loss, but positive for tinnitus and enlarge left submandibular LN  RESPIRATORY: positive for mild dry cough, no sputum, dyspnea only when febrile  CARDIOVASCULAR:  negative for chest pain, palpitations  GASTROINTESTINAL:  negative for nausea, vomiting, diarrhea or constipation  GENITOURINARY:  negative for dysuria  HEME:  No easy bruising  INTEGUMENT:  negative for rash or pruritus  NEURO:  Negative for headache    Past Medical History:   Diagnosis Date     GERD (gastroesophageal reflux disease)      H/O autologous stem cell transplant (H) 02/2005     Hyperlipidemia      Multiple myeloma (H) 2004     PONV (postoperative nausea and vomiting)      Psoriasis      Psoriatic arthritis (H)        Past Surgical History:   Procedure Laterality Date     ARTHROPLASTY HIP       COLONOSCOPY       HERNIA REPAIR       IR CVC TUNNEL PLACEMENT > 5 YRS OF AGE  1/22/2019     IR CVC TUNNEL PLACEMENT > 5 YRS OF AGE  5/16/2019     IR CVC TUNNEL REMOVAL LEFT  2/20/2019     IR CVC TUNNEL REMOVAL RIGHT  7/23/2019     IR FOLLOW UP VISIT OUTPATIENT  1/24/2019      IR LYMPH NODE BIOPSY  10/18/2019     ORTHOPEDIC SURGERY       PROCURE BONE MARROW N/A 5/14/2019    Procedure: Bone Marrow [place];  Surgeon: nAtwan Erickson MD;  Location: [facility] OR     TRANSPLANT         Family History   Problem Relation Age of Onset     Diabetes Mother      Cerebrovascular Disease Mother      Leukemia Father        Social History     Patient does not qualify to have social determinant information on file (likely too young).   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 He has not worked in the field since ~ 2005. He lives in [place] with his wife & dog. His wife does the yardwork. There are outdoor birds on the property. 2 of their children live in [place]. No prior TB exposures.      Social History     Tobacco Use     Smoking status: Former Smoker     Smokeless tobacco: Never Used   Substance Use Topics     Alcohol use: Yes     Comment: occasionaly     Drug use: No       Immunization History   Administered Date(s) Administered     Influenza (IIV3) PF 12/20/2013     Influenza Vaccine, 3 YRS +, IM (QUADRIVALENT W/PRESERVATIVES) 10/08/2015, 10/26/2016, 11/04/2017     TD (ADULT, 7+) 12/11/1996     TDAP Vaccine (Boostrix) 10/08/2015, 07/15/2019     Tetanus 12/11/1996       Patient Active Problem List   Diagnosis     Psoriasis with arthropathy (H)     Hypogammaglobulinemia (H)     Multiple myeloma not having achieved remission (H)     Lymphadenopathy     EBV (Muna-Barr virus) viremia     Fever in adult     Recurrent fever       Current Facility-Administered Medications   Medication     acetaminophen (TYLENOL) tablet 325-650 mg     acetaminophen (TYLENOL) tablet 325-650 mg     acyclovir (ZOVIRAX) tablet 800 mg     albuterol (PROVENTIL) neb solution 2.5 mg     ceFEPIme (MAXIPIME) 2 g vial to attach to  ml bag for ADULTS or 50 ml bag for PEDS     celecoxib (celeBREX) capsule 50 mg     eltrombopag (PROMACTA) tablet 50 mg      itraconazole (SPORANOX) capsule 200 mg     magnesium sulfate 4 g in 100 mL sterile water (premade)     meperidine (DEMEROL) injection 12.5 mg     naloxone (NARCAN) injection 0.1-0.4 mg     PARoxetine (PAXIL) tablet 20 mg     phytonadione (MEPHYTON/VITAMIN K) 1 MG/ML oral solution 10 mg     polyethylene glycol (MIRALAX/GLYCOLAX) Packet 17 g     potassium chloride (KLOR-CON) Packet 20-40 mEq     potassium chloride 10 mEq in 100 mL intermittent infusion with 10 mg lidocaine     potassium chloride 10 mEq in 100 mL sterile water intermittent infusion (premix)     potassium chloride 20 mEq in 50 mL intermittent infusion     potassium chloride ER (K-DUR/KLOR-CON M) CR tablet 20-40 mEq     potassium phosphate 15 mmol in D5W 250 mL intermittent infusion     potassium phosphate 20 mmol in D5W 250 mL intermittent infusion     potassium phosphate 20 mmol in D5W 500 mL intermittent infusion     potassium phosphate 25 mmol in D5W 500 mL intermittent infusion     prochlorperazine (COMPAZINE) injection 5-10 mg    Or     prochlorperazine (COMPAZINE) tablet 5-10 mg     sodium chloride 0.9% infusion       Allergies   Allergen Reactions     Avalide Hives     Chlorhexidine Itching     Chloroxylenol Rash     Technicare solution     Lorazepam Hives     Moxifloxacin Hives              Physical Exam:   Vitals were reviewed.  All vitals stable  BP (P) 123/73   Pulse (P) 63   Temp (P) 98.4  F (36.9  C) (Oral)   Resp (P) 24   SpO2 (P) 96%     Exam:  GENERAL:  well-developed, well-nourished, alert, oriented, in no acute distress.  HEENT:  Head is normocephalic, atraumatic   EYES:  Eyes have anicteric sclerae. Normal conjunctiva. No periorbital edema.   ENT:  Oropharynx is moist without exudates or ulcers.   NECK:  Supple. Left submandibular LAD  LN: Submandibular on left and right axillary, no inguinal that I could palpate.   LUNGS:  Clear to auscultation. Some fine crackles at bases.  CARDIOVASCULAR:  Regular rate and rhythm with no  murmurs, gallops or rubs.  ABDOMEN:  Normal bowel sounds, soft, nontender. No HSM.  SKIN:  No acute rashes.     NEUROLOGIC: Alert and oriented x 3. Grossly nonfocal.         Laboratory Data:   Inflammatory Markers    Recent Labs   Lab Test 11/10/19  0413 09/25/19  1022   *  --    .0* 160.0*       Immune Globulin Studies     Recent Labs   Lab Test 11/06/19  1051 10/16/19  0930 09/25/19  1018 08/23/19  1301 06/11/19  0918 05/06/19  0936 01/29/19  0810 01/14/19  0945   IGG 1,210 920 507* 824 572* 636* 826 864   IGM 31*  --   --  25* 20* 26* 43* 50*   IGE  --   --   --   --   --  3  --  6   IGA 80  --   --  15* 41* 45* 311 327       Metabolic Studies    Recent Labs   Lab Test 11/10/19  0413 11/09/19  1800 11/09/19  1112 11/08/19  1043 11/06/19  1051     --  134 136 139   POTASSIUM 3.4  --  3.7 4.0 3.9   CHLORIDE 104  --  102 104 107   CO2 25  --  25 25 27   ANIONGAP 6  --  8 8 6   BUN 18  --  16 10 12   CR 0.99  --  1.21 0.96 0.81   GFRESTIMATED 82  --  64 85 >90   *  --  133* 118* 92   ZHEN 8.0*  --  8.8 8.6 9.0   PHOS  --   --   --   --  3.5   MAG  --   --   --   --  2.2   URIC  --   --   --   --  3.7   LACT  --  1.6  --   --   --        Hepatic Studies    Recent Labs   Lab Test 11/10/19  0413 11/06/19  1051 11/01/19  0855   BILITOTAL 0.8 0.8 0.7   DBIL 0.3*  --   --    ALKPHOS 101 124 135   PROTTOTAL 6.0* 7.5 7.0   ALBUMIN 2.6* 3.7 3.5   AST 10 14 17   ALT 17 24 28   * 196  --        Hematology Studies     Recent Labs   Lab Test 11/10/19  1102 11/10/19  0413 11/09/19  1112 11/08/19  1043   WBC 2.2* 2.8* 5.8 5.7   ANEU 1.9 2.4 4.1 4.0   ALYM 0.1* 0.1* 0.9 0.6*   NEGAR 0.1 0.0 0.2 0.4   AEOS 0.1 0.2 0.5 0.5   HGB 6.6* 6.8* 8.3* 8.0*   HCT 19.7* 21.0* 24.6* 23.9*   PLT 13* 8* 6* 12*       Urine Studies     Recent Labs   Lab Test 11/08/19  1148 09/25/19  0822 09/23/19  1757 07/21/19 2013 05/30/19  2228   URINEPH 5.0 7.5* 6.0 6.0 6.0   NITRITE Negative Negative Negative Negative Negative    LEUKEST Negative Negative Negative Negative Negative   WBCU 3 <1 1 1 1       Microbiology:  Last 6 Culture results with specimen source  Culture Micro   Date Value Ref Range Status   11/10/2019 PENDING  Preliminary   11/10/2019 PENDING  Preliminary   11/09/2019 No growth after 21 hours  Preliminary   11/09/2019 No growth after 21 hours  Preliminary   11/09/2019 No growth after 23 hours  Preliminary   11/08/2019 No growth after 2 days  Preliminary    Specimen Description   Date Value Ref Range Status   11/10/2019 Blood  Final   11/10/2019 Blood Unspecified Site  Final   11/09/2019 Blood Right Hand  Final   11/09/2019 Blood Left Hand  Final   11/09/2019 Blood Right Arm  Final   11/08/2019 Blood  Final        Last check of C difficile  C Diff Toxin B PCR   Date Value Ref Range Status   05/24/2019 Negative NEG^Negative Final     Comment:     Negative: Clostridium difficile target DNA sequences NOT detected, presumed   negative for Clostridium difficile toxin B or the number of bacteria present   may be below the limit of detection for the test.  FDA approved assay performed using [employer] GeneXpert real-time PCR.  A negative result does not exclude actual disease due to Clostridium difficile   and may be due to improper collection, handling and storage of the specimen   or the number of organisms in the specimen is below the detection limit of the   assay.         EBV DNA Copies/mL   Date Value Ref Range Status   10/16/2019 2,725 (A) EBVNEG^EBV DNA Not Detected [Copies]/mL Final   10/08/2019 1,203 (A) EBVNEG^EBV DNA Not Detected [Copies]/mL Final   09/25/2019 2,215 (A) EBVNEG^EBV DNA Not Detected [Copies]/mL Final       PREVIOUS STUDIES:  Blood cultures:  11/9: NGTD  11/8: NGTD  11/1: neg  10/20: Micrococcus on 8th day of incubation (only from left arm, not from right - treated with levofloxacin from 11/1-11/7)  10/19: neg  Yeast BCx 10/18: neg  10/17: neg  10/16: neg  10/15: neg  9/28: neg  9/27: neg  9/26: neg  7/24:  neg  7/23: Pseudomonas  7/22: Pseudomonas  7/21: Pseudomonas aeruginosa pan sensitive    Francisella Tularensis IgM/IgG neg  C. Burnetti IgM/IgG neg  Brucella Ab neg  Bartonella PCR blood neg  B. Pertussis/parapertussis PCR blood neg  Parasite stain negative for babesia and ehrlichia/anaplasma  Ehrlichia/anaplasma PCR neg  Babesia PCR neg  Leptospira IgM neg 9/25 and 10/18  Fungal antibody panel (histo, cocci, blasto) neg  B. Boyd IgM/IgG neg  C. Psittaci IgM neg  C. Trachomatis IgM neg  C. Pneumoniae IgM neg  Crypto serum Ag neg  QTB gold 9/25 neg  BDG 9/25: neg  Galactomannan serum neg  Parvovirus IgM neg but IgG POSITIVE  Parvovirus PCR 10/18 neg  RVP 10/15 neg, 9/24 rhinovirus, 7/22 rhinovirus, 5/30 PIV-3  CMV DNA blood 10/16 neg  Histo urine Ag 9/25 and 10/18 neg  Blasto Ag neg  KARIUS 10/20 NEGATIVE    Pathology:  Bone marrow biopsy 11/6/19:  Bone marrow, posterior iliac crest, left decalcified trephine biopsy and touch imprint; direct aspirate smear, and concentrated aspirate smear; and peripheral blood smear:    - Predominantly subcortical biopsy; evaluable marrow has a cellularity of 10-20%; no morphologic or  immunophenotypic evidence of plasma cell neoplasm   - Peripheral blood showing marked macrocytic anemia, moderate leukopenia with absolute neutropenia and lymphopenia, and marked thrombocytopenia.     Left axillary lymph node biopsy, US guided 10/18/19:   FINAL DIAGNOSIS:   Lymph node, left axillary, ultrasound guided core needle biopsy:     - No morphologic or immunophenotypic evidence of malignancy     - Negative for EBV by in situ hybridization     COMMENT:   The morphologic, immunohistochemical, and immunophenotypic findings are most consistent with a reactive process; the differential diagnosis includes infectious and other inflammatory processes. There is no evidence of a hematolymphoid neoplasm, including a plasma cell neoplasm, or another malignancy, however unsampled neoplasm cannot be  definitively excluded in this limited sample.   Concurrent flow cytometry ([id number]) reports polytypic B cells.     Imaging:  Results for orders placed or performed during the hospital encounter of 11/09/19   CT Chest/Abdomen/Pelvis w Contrast    Narrative    CT CHEST/ABDOMEN/PELVIS W CONTRAST 11/9/2019 3:42 PM    History: Recurrent neutropenia and a bone marrow transplant. Recurrent  fever of unknown origin. Rule out focal infection.    Comparison: Chest CT 10/16/2019, CT abdomen pelvis 10/18/2019    Technique: Helical CT acquisition from the lung apices to the pubic  symphysis was obtained with intravenous contrast. Axial, coronal, and  sagittal reconstructions were obtained and reviewed.    Contrast: iopamidol ([id number]-370) solution 104 mL    Findings:     CHEST:     Lungs: No pneumothorax, pleural effusion, focal airspace opacity, or  suspicious pulmonary nodule. Stable interstitial reticular fibrotic  change along the posterior aspects of the lungs, right greater than  left. Calcified pulmonary granuloma in the right lower lobe.  Incidentally noted left minor fissure.  Airways: Central tracheobronchial tree is clear.  Vessels: Main pulmonary artery and aorta are normal in caliber. Normal  three-vessel arch. Mild calcification at the arch  Heart: Heart size is normal without pericardial effusion.  Lymph nodes: Stably enlarged mediastinal lymphadenopathy and bilateral  axillary, for instance:  -Subcarinal measuring 21 mm  -Right lower pretracheal measuring 15 mm  -Right axilla measuring 14 mm  -Left axilla measuring 16 mm    Calcified mediastinal and right hilar lymph nodes.    Thyroid: Within normal limits.  Esophagus: Within normal limits    ABDOMEN PELVIS:    Liver: Normal parenchymal attenuation without focal mass.  Biliary system: Gallbladder is within normal limits. No intrahepatic  or extrahepatic biliary ductal dilatation.  Pancreas: No focal mass or dilation of the main pancreatic duct.  Stomach: Small  sliding hernia.  Spleen: Stable peripheral based ill-defined hypodensities in the  spleen predominantly in the inferior aspect. Single calcification  within the spleen, likely from previous cannula metastases..  Adrenal glands: Within normal limits.  Kidneys: No hydronephrosis or stone. Small hypoattenuating lesions in  the left kidney too small to characterize, likely representing cysts.  There is a 1.6 x 1.5 x 1.6 cm hypoenhancing ill-defined focus in the  superior pole of the right kidney, best demonstrated on series 5,  image 65. This is slightly more prominent compared to previous exam  dated 10/18/2019, likely representing differences in contrast phase  rather than true change in size.  Bladder: Within normal limits.  Reproductive organs: Within normal limits.  Colon: Within normal limits other than moderate to large fecal burden.  Appendix: Within normal limits.  Small Bowel: Within normal limits.  Lymph nodes: No intra-abdominal or pelvic lymphadenopathy. Stable  prominent superficial inguinal chain lymph nodes measuring up to 13 mm  on the left and 12 mm on the right. Stable subcentimeter  retroperitoneal lymph nodes in the para-aortic region.  Vasculature: Within normal limits other than mild to moderate arterial  calcification.    Bones and soft tissues: Postoperative changes of bilateral inguinal  herniorrhaphy with a fluid attenuating lesion in the right groin  measuring 3.2 x 2.2 cm in the axial dimension on series 8, image 550  and a similar-appearing lesion in the left groin with central fat  density measuring in total 2.9 x 2.3 cm. These are stable and likely  postoperative.     Postoperative changes of right total hip arthroplasty with components  in good apposition. Moderate to advanced degenerative changes of the  left hip. The bones are significantly demineralized, predominantly in  the spine. Multiple stable compression fractures involving T5, T7-L4.      Impression    IMPRESSION: In this  patient with history of multiple myeloma status  post bone marrow transplant:  1. No specific findings to explain patient's fever of unknown origin.   2. There is a 1.6 cm hypoenhancing ill-defined focus in the superior  pole the right kidney. This is thought to represent sequela of prior  inflammation. Occult malignancy is thought less likely but not  entirely excluded. Recommend follow-up MRI in outpatient setting.  3. The spine is severely demineralized with multilevel compression  fractures which are stable.  4. Stable bilateral axillary, mediastinal, and superficial inguinal  lymphadenopathy.  5. Stable small peripheral ill-defined hypodensities within the  spleen, nonspecific and may be infarctions.    I have personally reviewed the examination and initial interpretation  and I agree with the findings.    DEMARCO MARTINEZ MD

## 2019-11-10 NOTE — PLAN OF CARE
Max temp 103.1 PA notified ,Tylenol and scheduled Celebrex given,last temp 98.7.blood cultures and urine cultures are pending ,pt had CXR and chest CT yesterday,  won't be repeated today. Pt. had chills and rigors this morning ,lasted for about an hour   RR at that time was 40 and his O2 dropped to 88% during this episode O2 was administered 3 L via NC pt. also received neb treatment. Patient has frequent dry nonproductive cough ,LS clear,BS+,had BM,and adequate urinary output.tolerated well regular diet.denies pain and nausea.hgb 6.6 pt received on unit of red blood cells ,platelet  this morning were 8  and pt. received one unit of platelets on recheck  platelets 13.CBC will be recked today at 1600.ID is following. Pt. is A&OX4 up indeep..Will continue to monitor.

## 2019-11-10 NOTE — PROGRESS NOTES
BMT Progress Note    ID: Angel Yanez is a 58 yo man D+177 s/p 2nd autologous transplant for MM. S/p 2 recent readmissions for FUO (10/16-10/23; 9/23-9/29/19).     HPI: Guille is tachypneic with RR in around 40. Sats on RA were 88%, so RN placed him on 3L O2 per nc. He had fevers overnight to 103, defervesced in between. This morning he feels he is spiking a fever again. Chilling in bed. Says this is not new, when he spikes fevers his respiratory rate becomes rapid and he shakes. He has no new focal infectious symptoms.     ROS: Negative except as stated above    Physical Exam  BP (!) 140/86 (BP Location: Right arm)   Pulse 109   Temp 99  F (37.2  C)   Resp (!) 40   SpO2 96%      Constitutional: lying in bed, rapid respirations  HEENT: MMM, EOM intact, sclerae clear and anicteric  Heart: Tachycardic but RR, no murmurs appreciated  Resp: Resp rate in the 40s. Started on 3L per nc for sats on RA at 88%. No adventitious sounds on lung exam  Abd: Soft, non-tender, BS+  Lymph: No peripheral edema  Skin: no rash   Neuro: No focal deficits    Labs  Lab Results   Component Value Date    WBC 2.8 (L) 11/10/2019    ANEU 2.4 11/10/2019    HGB 6.8 (LL) 11/10/2019    HCT 21.0 (L) 11/10/2019    PLT 8 (LL) 11/10/2019     11/10/2019    POTASSIUM 3.4 11/10/2019    CHLORIDE 104 11/10/2019    CO2 25 11/10/2019     (H) 11/10/2019    BUN 18 11/10/2019    CR 0.99 11/10/2019    MAG 2.2 11/06/2019    INR 1.40 (H) 11/09/2019    BILITOTAL 0.8 11/10/2019    AST 10 11/10/2019    ALT 17 11/10/2019    ALKPHOS 101 11/10/2019    PROTTOTAL 6.0 (L) 11/10/2019    ALBUMIN 2.6 (L) 11/10/2019     A/P:  Angel Yanez is a 58 yo man D+177 s/p 2nd autologous transplant for MM. S/p 2 recent readmissions for FUO (10/16-10/23; 9/23-9/29/19).      1.  BMT/MM:   Slow engraftment; cell dose was only 0.639x10^6. ([facility] [place] - known poor cell dose as failed chemo-mobilization 1/2019.)   - Stringent CR.  - day +180 bone marrow biopsy 11/6  which showed no morphologic or immunophenotypic evidence of plasma cell neoplasm.   - PET scan ordered 11/10 to evaluate for any bony disease not seen on CT that could be source of fever     2.  HEME: Keep Hgb>8g/dL, plt >10-20 (epistaxis requiring rhinorocket packing in last admission).   - anemic, thrombocytopenic post BMT, low cell dose, +/- infection.   - intermittent neutropenia: GCSF last on 11/6; give prn ANC </=1000.   - No evidence of HLH on lymphnode bx 10/18/19.     - Persistent thrombocytopenia: BM 11/6/19 showed adequate granulopoiesis and erythropoiesis but his platelet precursors were 0 to absent which is reflected in peripheral CBC. Start promactic 50mcg daily on 11/10  - Hgb drop from 8.3 to 6.8, no overt bleeding. Check CBC again at 1600, hemolysis eval (smear, LDH, hapto, ROMMEL) = pending  - long INR. Add vit K 10mg PO daily      3.  ID: Afebrile.  Persistent fevers: extensive ID work up unremarkable. 10/16 repeat chest CT: persistent mediastinal adenopathy no paranchymal disease. S/P bx of L axillary node on 10/18: No evidence of HLH, tissues looks reactive.  *ID consulted 11/10, appreciate recs. Repeat CTcap from 11/9 shows no significant change from prior to explain fevers. Cont cefepime. Blood cx, fungal cx, AFB cx, lyme antibodies = pending. Autoimmune workup (BINU, RF, ESR, anti citrullinated, anti double stranded DNA) = pending.  - Per ID, possible histoplasmosis: empiric itraconazole 200 TID started last admission with fevers initially resolved but now recurred; currently on bid dosing. Pt declining recommened ID follow-up in December. 10/18 Histo/blasto urine antigen negative. Per Dr Kelley flucaonzole can suppress growth? 10/18 [place] DNA test- negative.   - + Micrococcus from 10/20 blood culture:  Treat with oral levaquin 500mg daily x 7d (11/1-11/7).    - Per Dr. Jones, his recurring fevers might be autoimmune/inflammatory in nature - received pred 40mg x 1 on 11/10. Dr. Godwin  prefers not to continue this. Will schedule celebrex BID (pt says this works better than tylenol)  - prophy ACV, Pentamidine (last 10/17/19)  - On CMV vaccine trial.   - CMV neg and EBV 2725 (10/16)     4. Pulm:   - tachypnea: per charity this is how he feels with fevers. CXR and CT chest negative, no abnormal findings on auscultation. Albuterol nebs. Other DDx: TRALI/TACO from blood products vs DAH with drop in Hgb? Would have low threshhold to repeat CXR if he doesn't improve after fever breaks  - 10/17: Echo with preserved EF, no evidence of right heart strain.  - 10/17 VQ scan neg for PE.     5. GI:    - LFTs are wnl     5.  FEN/Renal:   - Cr better at 0.99  - IVF NS 75 ml/hr.  - Lyte replacement per scheduled protocol.       6.  Mood: Continue Paxil.    Addendum: discontinue PET scan order on further chart review since he had one recently in August with no findings to explain fevers    Alejandra Adorno PA-C  [phone]      The patient was seen and examined by me separately from the midlevel provider. The note reflects our mutual assessment and plans and were approved by me personally.   I personally reviewed today's lab results vital signs and radiology results.    Each point of the assessment and plan were reviewed by the midlevel and me and either endorsed by me or were my added decisions.    My pertinent physical findings today are: Febrile, clear lungs and no rash.    My assessment and plan are: 60 yo 180 day post second auto PBSC for myeloma admitted for w/u FUO. CT CAP -. On itraconozole for possible  Histoplasmosis. Do connective tissue disease autoimmun w/u (R/O rheumatoid arthritis, SLE ans also Lyme disease.) Consult ID. Schedule celebrex for comfort.    Stephan Godwin M.D.

## 2019-11-10 NOTE — PLAN OF CARE
"Temp max ov 103 this shift. OVSS. Pt denies pain. Denies having chills or feeling feverish- \"I'm used to running 103\". Pt reports sleeping well overnight. Up independently. IVF running at 75ml hour. PIV positional, pt reporting he may want new one during day as it will occlude with minimal movement of arm.  Nausea present, small emesis last evening. Declined medication. Hourly rounding done. Will continue to monitor.     Problem: Adult Inpatient Plan of Care  Goal: Plan of Care Review  Outcome: No Change     Problem: Adult Inpatient Plan of Care  Goal: Patient-Specific Goal (Individualization)  Outcome: No Change     Problem: Adult Inpatient Plan of Care  Goal: Absence of Hospital-Acquired Illness or Injury  Outcome: No Change     Problem: Adult Inpatient Plan of Care  Goal: Optimal Comfort and Wellbeing  Outcome: No Change     Problem: Adult Inpatient Plan of Care  Goal: Readiness for Transition of Care  Outcome: No Change     Problem: Adult Inpatient Plan of Care  Goal: Rounds/Family Conference  Outcome: No Change     Problem: Fever (Fever with Neutropenia)  Goal: Baseline Body Temperature  Outcome: No Change     Problem: Infection Risk (Fever with Neutropenia)  Goal: Absence of Infection  Outcome: No Change     "

## 2019-11-11 ENCOUNTER — APPOINTMENT (OUTPATIENT)
Dept: GENERAL RADIOLOGY | Facility: CLINIC | Age: 59
DRG: 853 | End: 2019-11-11
Attending: INTERNAL MEDICINE
Payer: COMMERCIAL

## 2019-11-11 LAB
ABO + RH BLD: NORMAL
ABO + RH BLD: NORMAL
ANA SER QL IF: NEGATIVE
ANION GAP SERPL CALCULATED.3IONS-SCNC: 8 MMOL/L (ref 3–14)
ANISOCYTOSIS BLD QL SMEAR: ABNORMAL
ANISOCYTOSIS BLD QL SMEAR: SLIGHT
B BURGDOR IGG+IGM SER QL: 0.05 (ref 0–0.89)
B-HCG SERPL-ACNC: <1 IU/L
BASOPHILS # BLD AUTO: 0 10E9/L (ref 0–0.2)
BASOPHILS # BLD AUTO: 0 10E9/L (ref 0–0.2)
BASOPHILS NFR BLD AUTO: 0 %
BASOPHILS NFR BLD AUTO: 0 %
BLD GP AB SCN SERPL QL: NORMAL
BLD PROD TYP BPU: NORMAL
BLD UNIT ID BPU: 0
BLOOD BANK CMNT PATIENT-IMP: NORMAL
BLOOD PRODUCT CODE: NORMAL
BPU ID: NORMAL
BUN SERPL-MCNC: 22 MG/DL (ref 7–30)
BURR CELLS BLD QL SMEAR: SLIGHT
CALCIUM SERPL-MCNC: 8 MG/DL (ref 8.5–10.1)
CCP AB SER IA-ACNC: 2 U/ML
CHLORIDE SERPL-SCNC: 106 MMOL/L (ref 94–109)
CO2 SERPL-SCNC: 22 MMOL/L (ref 20–32)
COPATH REPORT: NORMAL
CREAT SERPL-MCNC: 0.74 MG/DL (ref 0.66–1.25)
DEPRECATED CALCIDIOL+CALCIFEROL SERPL-MC: <51 UG/L (ref 20–75)
DIFFERENTIAL METHOD BLD: ABNORMAL
DIFFERENTIAL METHOD BLD: ABNORMAL
DSDNA AB SER-ACNC: 3 IU/ML
EOSINOPHIL # BLD AUTO: 0.4 10E9/L (ref 0–0.7)
EOSINOPHIL # BLD AUTO: 0.4 10E9/L (ref 0–0.7)
EOSINOPHIL NFR BLD AUTO: 14.8 %
EOSINOPHIL NFR BLD AUTO: 15.7 %
ERYTHROCYTE [DISTWIDTH] IN BLOOD BY AUTOMATED COUNT: 21.9 % (ref 10–15)
ERYTHROCYTE [DISTWIDTH] IN BLOOD BY AUTOMATED COUNT: 24.3 % (ref 10–15)
FERRITIN SERPL-MCNC: 1978 NG/ML (ref 26–388)
GFR SERPL CREATININE-BSD FRML MDRD: >90 ML/MIN/{1.73_M2}
GLUCOSE SERPL-MCNC: 145 MG/DL (ref 70–99)
HAPTOGLOB SERPL-MCNC: 234 MG/DL (ref 35–175)
HCT VFR BLD AUTO: 23.2 % (ref 40–53)
HCT VFR BLD AUTO: 26.7 % (ref 40–53)
HGB BLD-MCNC: 7.6 G/DL (ref 13.3–17.7)
HGB BLD-MCNC: 8.7 G/DL (ref 13.3–17.7)
LDH SERPL L TO P-CCNC: 225 U/L (ref 85–227)
LYMPHOCYTES # BLD AUTO: 0.1 10E9/L (ref 0.8–5.3)
LYMPHOCYTES # BLD AUTO: 0.1 10E9/L (ref 0.8–5.3)
LYMPHOCYTES NFR BLD AUTO: 3.5 %
LYMPHOCYTES NFR BLD AUTO: 3.5 %
MCH RBC QN AUTO: 30.9 PG (ref 26.5–33)
MCH RBC QN AUTO: 32.3 PG (ref 26.5–33)
MCHC RBC AUTO-ENTMCNC: 32.6 G/DL (ref 31.5–36.5)
MCHC RBC AUTO-ENTMCNC: 32.8 G/DL (ref 31.5–36.5)
MCV RBC AUTO: 95 FL (ref 78–100)
MCV RBC AUTO: 99 FL (ref 78–100)
MICROCYTES BLD QL SMEAR: PRESENT
MONOCYTES # BLD AUTO: 0.1 10E9/L (ref 0–1.3)
MONOCYTES # BLD AUTO: 0.1 10E9/L (ref 0–1.3)
MONOCYTES NFR BLD AUTO: 2.6 %
MONOCYTES NFR BLD AUTO: 3.5 %
NEUTROPHILS # BLD AUTO: 2 10E9/L (ref 1.6–8.3)
NEUTROPHILS # BLD AUTO: 2 10E9/L (ref 1.6–8.3)
NEUTROPHILS NFR BLD AUTO: 78.2 %
NEUTROPHILS NFR BLD AUTO: 78.2 %
NUM BPU REQUESTED: 1
NUM BPU REQUESTED: 1
NUM BPU REQUESTED: 3
PLATELET # BLD AUTO: 16 10E9/L (ref 150–450)
PLATELET # BLD AUTO: 19 10E9/L (ref 150–450)
PLATELET # BLD EST: ABNORMAL 10*3/UL
PLATELET # BLD EST: ABNORMAL 10*3/UL
POIKILOCYTOSIS BLD QL SMEAR: SLIGHT
POIKILOCYTOSIS BLD QL SMEAR: SLIGHT
POLYCHROMASIA BLD QL SMEAR: SLIGHT
POTASSIUM SERPL-SCNC: 2.9 MMOL/L (ref 3.4–5.3)
POTASSIUM SERPL-SCNC: 3.8 MMOL/L (ref 3.4–5.3)
RBC # BLD AUTO: 2.35 10E12/L (ref 4.4–5.9)
RBC # BLD AUTO: 2.82 10E12/L (ref 4.4–5.9)
RHEUMATOID FACT SER NEPH-ACNC: <20 IU/ML (ref 0–20)
SODIUM SERPL-SCNC: 136 MMOL/L (ref 133–144)
SPECIMEN EXP DATE BLD: NORMAL
TRANSFUSION STATUS PATIENT QL: NORMAL
TRIGL SERPL-MCNC: 138 MG/DL
VITAMIN D2 SERPL-MCNC: <5 UG/L
VITAMIN D3 SERPL-MCNC: 46 UG/L
WBC # BLD AUTO: 2.5 10E9/L (ref 4–11)
WBC # BLD AUTO: 2.5 10E9/L (ref 4–11)

## 2019-11-11 PROCEDURE — 25000132 ZZH RX MED GY IP 250 OP 250 PS 637: Performed by: INTERNAL MEDICINE

## 2019-11-11 PROCEDURE — P9037 PLATE PHERES LEUKOREDU IRRAD: HCPCS | Performed by: INTERNAL MEDICINE

## 2019-11-11 PROCEDURE — 87385 HISTOPLASMA CAPSUL AG IA: CPT | Performed by: INTERNAL MEDICINE

## 2019-11-11 PROCEDURE — P9040 RBC LEUKOREDUCED IRRADIATED: HCPCS | Performed by: INTERNAL MEDICINE

## 2019-11-11 PROCEDURE — 87449 NOS EACH ORGANISM AG IA: CPT | Performed by: INTERNAL MEDICINE

## 2019-11-11 PROCEDURE — 83520 IMMUNOASSAY QUANT NOS NONAB: CPT | Performed by: STUDENT IN AN ORGANIZED HEALTH CARE EDUCATION/TRAINING PROGRAM

## 2019-11-11 PROCEDURE — 87103 BLOOD FUNGUS CULTURE: CPT | Performed by: INTERNAL MEDICINE

## 2019-11-11 PROCEDURE — 25000128 H RX IP 250 OP 636: Performed by: PHYSICIAN ASSISTANT

## 2019-11-11 PROCEDURE — 86235 NUCLEAR ANTIGEN ANTIBODY: CPT | Performed by: INTERNAL MEDICINE

## 2019-11-11 PROCEDURE — 25000132 ZZH RX MED GY IP 250 OP 250 PS 637: Performed by: PHYSICIAN ASSISTANT

## 2019-11-11 PROCEDURE — 36415 COLL VENOUS BLD VENIPUNCTURE: CPT | Performed by: INTERNAL MEDICINE

## 2019-11-11 PROCEDURE — 83615 LACTATE (LD) (LDH) ENZYME: CPT | Performed by: INTERNAL MEDICINE

## 2019-11-11 PROCEDURE — 25000128 H RX IP 250 OP 636: Performed by: INTERNAL MEDICINE

## 2019-11-11 PROCEDURE — 84478 ASSAY OF TRIGLYCERIDES: CPT | Performed by: INTERNAL MEDICINE

## 2019-11-11 PROCEDURE — 73120 X-RAY EXAM OF HAND: CPT | Mod: 50

## 2019-11-11 PROCEDURE — 84702 CHORIONIC GONADOTROPIN TEST: CPT | Performed by: INTERNAL MEDICINE

## 2019-11-11 PROCEDURE — 82728 ASSAY OF FERRITIN: CPT | Performed by: INTERNAL MEDICINE

## 2019-11-11 PROCEDURE — 36415 COLL VENOUS BLD VENIPUNCTURE: CPT | Performed by: STUDENT IN AN ORGANIZED HEALTH CARE EDUCATION/TRAINING PROGRAM

## 2019-11-11 PROCEDURE — 20600000 ZZH R&B BMT

## 2019-11-11 PROCEDURE — 80048 BASIC METABOLIC PNL TOTAL CA: CPT | Performed by: INTERNAL MEDICINE

## 2019-11-11 PROCEDURE — 25000125 ZZHC RX 250: Performed by: PHYSICIAN ASSISTANT

## 2019-11-11 PROCEDURE — 85025 COMPLETE CBC W/AUTO DIFF WBC: CPT | Performed by: INTERNAL MEDICINE

## 2019-11-11 PROCEDURE — 84132 ASSAY OF SERUM POTASSIUM: CPT | Performed by: INTERNAL MEDICINE

## 2019-11-11 PROCEDURE — 87040 BLOOD CULTURE FOR BACTERIA: CPT | Performed by: INTERNAL MEDICINE

## 2019-11-11 PROCEDURE — 25800030 ZZH RX IP 258 OP 636: Performed by: INTERNAL MEDICINE

## 2019-11-11 RX ORDER — CELECOXIB 100 MG/1
100 CAPSULE ORAL 2 TIMES DAILY
Status: DISCONTINUED | OUTPATIENT
Start: 2019-11-11 | End: 2019-11-17 | Stop reason: HOSPADM

## 2019-11-11 RX ORDER — CELECOXIB 50 MG/1
50 CAPSULE ORAL ONCE
Status: COMPLETED | OUTPATIENT
Start: 2019-11-11 | End: 2019-11-11

## 2019-11-11 RX ADMIN — POTASSIUM CHLORIDE 40 MEQ: 750 TABLET, EXTENDED RELEASE ORAL at 13:12

## 2019-11-11 RX ADMIN — ACETAMINOPHEN 650 MG: 325 TABLET, FILM COATED ORAL at 07:24

## 2019-11-11 RX ADMIN — SODIUM CHLORIDE: 9 INJECTION, SOLUTION INTRAVENOUS at 05:51

## 2019-11-11 RX ADMIN — ACYCLOVIR 800 MG: 800 TABLET ORAL at 08:07

## 2019-11-11 RX ADMIN — POTASSIUM CHLORIDE 40 MEQ: 750 TABLET, EXTENDED RELEASE ORAL at 10:44

## 2019-11-11 RX ADMIN — ACYCLOVIR 800 MG: 800 TABLET ORAL at 19:23

## 2019-11-11 RX ADMIN — ITRACONAZOLE 200 MG: 100 CAPSULE ORAL at 18:07

## 2019-11-11 RX ADMIN — CEFEPIME 2 G: 2 INJECTION, POWDER, FOR SOLUTION INTRAVENOUS at 08:05

## 2019-11-11 RX ADMIN — ACETAMINOPHEN 650 MG: 325 TABLET, FILM COATED ORAL at 18:07

## 2019-11-11 RX ADMIN — CELECOXIB 50 MG: 50 CAPSULE ORAL at 08:07

## 2019-11-11 RX ADMIN — PHYTONADIONE 10 MG: 10 INJECTION, EMULSION INTRAMUSCULAR; INTRAVENOUS; SUBCUTANEOUS at 08:06

## 2019-11-11 RX ADMIN — PAROXETINE HYDROCHLORIDE HEMIHYDRATE 20 MG: 20 TABLET, FILM COATED ORAL at 08:10

## 2019-11-11 RX ADMIN — CELECOXIB 100 MG: 100 CAPSULE ORAL at 19:23

## 2019-11-11 RX ADMIN — CEFEPIME 2 G: 2 INJECTION, POWDER, FOR SOLUTION INTRAVENOUS at 15:13

## 2019-11-11 RX ADMIN — ITRACONAZOLE 200 MG: 100 CAPSULE ORAL at 08:07

## 2019-11-11 RX ADMIN — ELTROMBOPAG OLAMINE 50 MG: 50 TABLET, FILM COATED ORAL at 08:07

## 2019-11-11 RX ADMIN — CELECOXIB 50 MG: 50 CAPSULE ORAL at 10:43

## 2019-11-11 ASSESSMENT — ACTIVITIES OF DAILY LIVING (ADL)
ADLS_ACUITY_SCORE: 11

## 2019-11-11 NOTE — PLAN OF CARE
VSS.  AF.  Diaphoretic x 2, needing a linen change x 1.  Eating and drinking well.  Voiding well.  Needs urine sample.  Ambulated in halls x 1.  Hgb 7.3/plt 17- received plt and RBC transfusions.  Will continue with plan of care.      Problem: Adult Inpatient Plan of Care  Goal: Plan of Care Review  Outcome: No Change     Problem: Fever (Fever with Neutropenia)  Goal: Baseline Body Temperature  Outcome: No Change     Problem: Infection Risk (Fever with Neutropenia)  Goal: Absence of Infection  Outcome: No Change

## 2019-11-11 NOTE — CONSULTS
RHEUMATOLOGY CONSULT NOTE - FELLOW    Angel Yanez MRN# [id number]   Age: 59 year old YOB: 1960     Date of Admission:  11/9/2019  Reason for consult: FUO (negative ID and malignancy work up so far)    Assessment and Plan:   Discussion: Angel Yanez is a 60 yo man D+178 s/p 2nd autologous PBSCT (cytoxan/melphalan) transplant for MM. S/p 2 recent readmissions for FUO (10/16-10/23; 9/23-9/29/19). Extensive infectious and cancer w/u has been done so far and all negative. Lymph node biopsy did not show evidence of HLH. Bone marrow biopsy 11/6 showed no morphologic or immunophenotypic evidence of plasma cell neoplasm. CRP in the 200's and ESR in the 100's makes infection more likely than autoimmune diseases. Pt had history of psoriatic arthritis and was on methotrexate in the past. However, no evidence by physical exam or history of present inflammatory illness to support possible autoimmune disease flare with fever. Moreover, BINU, RF, anti CCP, ds DNA all negative. We recommend rechecking HLH labs and obtain hands X-ray to further evaluate for chronic arthritis. CT chest showed fibrotic change. Not progressive compared to previous CT. We recommend checking soluble IL-2RA and Jo1. Finally, we found him having difficulty with finding words. Would recommend possible CNS infection work up in the setting of immunosuprressed state.    Problem list:  FUO  MM s/p autologous PBSCT  anemia, thrombocytopenia post BMT  intermittent neutropenia  Hx of psoriatic arthritis   CT finding of fibrotic change in lungs, a 1.6 x 1.5 x 1.6 cm hypoenhancing ill-defined focus in the  superior pole of the right kidney    Recommendations:  -- Hands X-ray (ordered)  -- Repeat Ferritin, triglyceride, LDH (ordered)  -- Anti Jo1, soluble IL-2RA (ordered)  -- Consider work up for possible CNS infection   -- Consider work up for possible infection or malignancy in kidney given CT finding     The patient was seen and staffed with  Dr. Edwards.     Yane Gonzalez MD   PGY-3  [phone]    Rheumatology Attending:    This patient was interviewed and examined in the presence of the medical resident, and this note reflects our mutual impression.      Adebayo Edwards MD  Professor of Medicine  Director, Division of Rheumatic and Autoimmune Diseases             Chief Complaint:   Rheumatology was consulted for FUO         History of Present Illness:   This is a 60 yo male with h/o 2nd autologous PBSCT (cytoxan/melphalan) on 5/17/19 for multiple myeloma c/b slow engraftment and prolonged neutropenia, suspected disseminated histoplasmosis on itraconazole and recurrent fevers. Now admitted again with fevers.    Patient was discharged from [facility] on 10/[facility]. He was in his usual state of health, afebrile until Wednesday night (11/6). He had a bone marrow biopsy that morning. T max at home was 103.6 associated with rigors and chills. He has a long standing dry cough since transplant. Denies HAs, myalgias only with fevers, arthralgias, joint swelling, periorbital edema, n/v, abdominal pain, diarrhea, chest pain, dyspnea or skin rashes. No urinary symptoms. He has poor appetite.      He had psoriatic arthritis and was on methotrexate in the past. Had iritis in association with his peripheral SpA. He reported bilateral first MCP deformity but denied any active joints pain or morning stiffness. Denied any rash besides psoriasis, photosensitivity, vision change, oral lesions, history of IBS, or any family history of autoimmune diseases.           Past Medical History:     Past Medical History:   Diagnosis Date     GERD (gastroesophageal reflux disease)      H/O autologous stem cell transplant (H) 02/2005     Hyperlipidemia      Multiple myeloma (H) 2004     PONV (postoperative nausea and vomiting)      Psoriasis      Psoriatic arthritis (H)              Past Surgical History:     Past Surgical History:   Procedure Laterality Date     ARTHROPLASTY HIP        COLONOSCOPY       HERNIA REPAIR       IR CVC TUNNEL PLACEMENT > 5 YRS OF AGE  1/22/2019     IR CVC TUNNEL PLACEMENT > 5 YRS OF AGE  5/16/2019     IR CVC TUNNEL REMOVAL LEFT  2/20/2019     IR CVC TUNNEL REMOVAL RIGHT  7/23/2019     IR FOLLOW UP VISIT OUTPATIENT  1/24/2019     IR LYMPH NODE BIOPSY  10/18/2019     ORTHOPEDIC SURGERY       PROCURE BONE MARROW N/A 5/14/2019    Procedure: Bone Marrow [place];  Surgeon: Antwan Erickson MD;  Location: [facility] OR     TRANSPLANT              Social History:     Social History     Socioeconomic History     Marital status:      Spouse name: Not on file     Number of children: Not on file     Years of education: Not on file     Highest education level: Not on file   Occupational History     Not on file   Social Needs     Financial resource strain: Not on file     Food insecurity:     Worry: Not on file     Inability: Not on file     Transportation needs:     Medical: Not on file     Non-medical: Not on file   Tobacco Use     Smoking status: Former Smoker     Smokeless tobacco: Never Used   Substance and Sexual Activity     Alcohol use: Yes     Comment: occasionaly     Drug use: No     Sexual activity: Not on file   Lifestyle     Physical activity:     Days per week: Not on file     Minutes per session: Not on file     Stress: Not on file   Relationships     Social connections:     Talks on phone: Not on file     Gets together: Not on file     Attends Protestant service: Not on file     Active member of club or organization: Not on file     Attends meetings of clubs or organizations: Not on file     Relationship status: Not on file     Intimate partner violence:     Fear of current or ex partner: Not on file     Emotionally abused: Not on file     Physically abused: Not on file     Forced sexual activity: Not on file   Other Topics Concern     Parent/sibling w/ CABG, MI or angioplasty before 65F 55M? 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 He has not worked in the field since ~ 2005. He lives in [place] with his wife & dog. His wife does the yardwork. There are outdoor birds on the property. 2 of their children live in [place]. No prior TB exposures.              Family History:     Family History   Problem Relation Age of Onset     Diabetes Mother      Cerebrovascular Disease Mother      Leukemia Father              Immunizations:     Most Recent Immunizations   Administered Date(s) Administered     Influenza (IIV3) PF 12/20/2013     Influenza Vaccine, 3 YRS +, IM (QUADRIVALENT W/PRESERVATIVES) 11/04/2017     TD (ADULT, 7+) 12/11/1996     TDAP Vaccine (Boostrix) 07/15/2019     Tetanus 12/11/1996             Allergies:     Allergies   Allergen Reactions     Avalide Hives     Chlorhexidine Itching     Chloroxylenol Rash     [employer]icare solution     Lorazepam Hives     Moxifloxacin Hives             Medications:     Medications Prior to Admission   Medication Sig Dispense Refill Last Dose     acetaminophen (TYLENOL) 325 MG tablet Take 1,000 mg by mouth every 6 hours as needed for mild pain   11/9/2019 at 0800     acyclovir (ZOVIRAX) 800 MG tablet Take 1 tablet (800 mg) by mouth 2 times daily   11/9/2019 at 0800     amoxicillin-clavulanate (AUGMENTIN) 875-125 MG tablet Take 1 tablet by mouth 2 times daily for 7 days 14 tablet 0 11/8/2019 at 2000     calcium carbonate (CALCIUM CARBONATE) 600 MG tablet Take 2 tablets by mouth daily    11/9/2019 at 0800     Cholecalciferol (VITAMIN D3) 2000 units CAPS Take 2,000 Units by mouth daily    11/9/2019 at 0800     itraconazole (SPORANOX) 100 MG capsule Take 2 capsules (200 mg) by mouth 2 times daily 120 capsule 0 11/9/2019 at 0800     levofloxacin (LEVAQUIN) 500 MG tablet Take 1 tablet (500 mg) by mouth daily 7 tablet 0 Past Week at Unknown time     PARoxetine (PAXIL) 20 MG tablet Take 20 mg by mouth every morning   11/9/2019 at 0800     pentamidine (NEBUPENT) 300 MG  neb solution Inhale 300 mg into the lungs every 28 days Will get in BMT clinic. Next due 10/17/19   Past Month at Unknown time       Current Facility-Administered Medications   Medication     acetaminophen (TYLENOL) tablet 325-650 mg     acetaminophen (TYLENOL) tablet 325-650 mg     acyclovir (ZOVIRAX) tablet 800 mg     albuterol (PROVENTIL) neb solution 2.5 mg     ceFEPIme (MAXIPIME) 2 g vial to attach to  ml bag for ADULTS or 50 ml bag for PEDS     celecoxib (celeBREX) capsule 100 mg     eltrombopag (PROMACTA) tablet 50 mg     itraconazole (SPORANOX) capsule 200 mg     magnesium sulfate 4 g in 100 mL sterile water (premade)     meperidine (DEMEROL) injection 12.5 mg     naloxone (NARCAN) injection 0.1-0.4 mg     PARoxetine (PAXIL) tablet 20 mg     phytonadione (MEPHYTON/VITAMIN K) 1 MG/ML oral solution 10 mg     polyethylene glycol (MIRALAX/GLYCOLAX) Packet 17 g     potassium chloride (KLOR-CON) Packet 20-40 mEq     potassium chloride 10 mEq in 100 mL intermittent infusion with 10 mg lidocaine     potassium chloride 10 mEq in 100 mL sterile water intermittent infusion (premix)     potassium chloride 20 mEq in 50 mL intermittent infusion     potassium chloride ER (K-DUR/KLOR-CON M) CR tablet 20-40 mEq     potassium phosphate 15 mmol in D5W 250 mL intermittent infusion     potassium phosphate 20 mmol in D5W 250 mL intermittent infusion     potassium phosphate 20 mmol in D5W 500 mL intermittent infusion     potassium phosphate 25 mmol in D5W 500 mL intermittent infusion     prochlorperazine (COMPAZINE) injection 5-10 mg    Or     prochlorperazine (COMPAZINE) tablet 5-10 mg     sodium chloride 0.9% infusion            Review of Systems:   Complete 12 point ROS completed and negative unless mentioned in HPI.          Physical Exam:   BP (!) 142/80 (BP Location: Left arm)   Pulse 53   Temp 100.7  F (38.2  C) (Axillary)   Resp 20   Wt 79.2 kg (174 lb 11.2 oz)   SpO2 93%   BMI 24.50 kg/m      Constitutional:  lying in bed, NAD  HEENT: MMM, EOM intact, sclerae clear and anicteric  Heart: RRR, no murmurs appreciated  Resp: rales at base bilaterally  Abd: Soft, non-tender, BS+  Extremities: No peripheral edema  Skin: no rash   MSK: MMT all 5/5, wrist flexion 50 degree, wrist extension 75 degree, other joints ROM full, no joints effusion or sign of synovitis.  Neuro: No focal deficits, however, pt had difficulty with finding words          Data:   Labs and imaging reviewed.     Yane Gonzalez MD  Internal Medicine PGY-3  [phone]

## 2019-11-11 NOTE — PROGRESS NOTES
Psychosocial Assessment completed in the BMT Clinic and coped here for staff review.     Blood and Marrow Transplant   Psychosocial Assessment with   Clinical      REPEAT Assessment completed on 5/6/2019 of living situation, support system, financial status, functional status, coping, stressors, need for resources and social work intervention provided as needed. Information for this assessment was provided by pt's report in addition to medical chart review and consultation with medical team.      Present at assessment:   Patient: Angel Yanez  : ZUHAIR Woodard, JANELL     Diagnosis: Multiple Myeloma     Transplant type: Autologous     Donor: Autologous      Physician: Adebayo Lucio MD     Nurse Coordinator: Su Pate RN     Permanent Address:   [address] [place] [id number]     Contact Information:  Pt's Home Phone: [phone]  Pt's Cell Phone: [phone]  Pt Email: [email or web address]  Wife-Jennifer Yanez's Phone: [phone]  Son-Dalton Yanez's Phone: [phone]     Presenting Information:  Zaheer is a 58 year old male diagnosed with multiple myeloma who presents for evaluation for autologous transplant at the [facility] ([facility] This will be pt's 2nd autologous transplant at the [facility].     Decision Making: Self     Health Care Directive: Yes. Pt has completed the healthcare directive completed and on file.      Relationship Status: . Pt described relationship as stable and supportive.      Special Needs: None identified at this time.      Family/Support System: Pt endorsed a good support system including family and close friends who will be available to support pt throughout transplant process.      Spouse: Jennifer Yanez  Children: 3 Children; Son-Alivia Yanez (28-Lives in [place], [place]), Son-Magdiel Yanez (Lives in [place], [place]) and Daughter-Jerry Yanez (Lives in [place])  Grandchildren: 1 Grandson-Alivia Tyler (1.5 years  "old)  Parents:   Siblings: 1 Sister-Sofie Calvin (Lives in [place], [place])     Caregiver: JANELL discussed with pt the caregiver role and expectation at length. During last PSA on 1/15/2019, pt said he won t have a caregiver 24/7 but will tell me he does if that is required and pt said SW can put his sonLucille down as well  if Social Work needed another name on the caregiver contract . During the PSA today (2019), pt said \"oh yea I will have a caregiver\". Pt's wife runs a small  out of the home, so she is there during the day. SW reminded pt of the last conversation and SW repeatedly reminded pt he is required to have a caregiver 24/7 for 30 days. Pt signed the caregiver contract which will be scanned into the EMR. Pt said his wife-Jennifer and son-Dalton will be the caregivers. Caregiver education and resources provided. EULALIO Delgadillo, and Andrew are aware. Dr. Lucio said he would reinforce the caregiver requirement during the close appointment.       Caregiver Contact Information:  Wife-Jennifer Yanez's Phone: [phone]  SonKiera Yanez's Phone: [phone]     Transportation Mode: Private Vehicle. During last PSA on 1/15/2019, pt said he will be driving himself to clinic post-transplant. JANELL thoroughly explained to pt that he cannot drive post-transplant until approved by a Physician. EULALIO Delgadillo, and Andrew were notified. Dr. Lucio said he would reinforce the \"no driving\" during the close appointment as well.      Insurance: Pt has [employer] health insurance; pt has an . Pt denied specific insurance concerns at this time. JANELL reiterated information about the [employer]  should specific insurance questions arise as pt moves through transplant process. Future Insurance questions referred to [employer] -Jessica Downs (P: [phone]).      Sources of Income: Pt's income, pt went back to work, and his " "wife's income. Once pt completely stops working, pt will re-file for short term disability. Pt also has long term disability available. Pt denied anticipation of financial hardship related to BMT at this time. SW provided information on gay options and encouraged pt to contact this SW for support should financial situation change.      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 Pt's wife-Jennifer owns her own  at home.      Mental Health: Pt denied a history of mental health concerns, specific diagnoses or medications at this time. SW explained that it's not uncommon for patients going through transplant to experience symptoms of depression/anxiety.      PHQ-9:  Pt scored a 4 which indicates no sign of depression on the depression severity scale. Pt does not endorse feelings  of depression at this time.      GAD7:  Pt scored a 3 which indicates no sign of anxiety on the Generalized Anxiety Disorder Questionnaire. Pt endorses this is an accurate reflection of his emotional state.     Chemical Use: Pt reported that he quit smoking 35 years ago. Pt reported minimal alcohol consumption; 2 beers in the past 3 months. Pt reported no hx of marijuana or other drugs. Based on the information provided, there appear to be no specific risks or concerns identified at this time.      Trauma/Loss/Abuse History: Multiple losses associated with cancer diagnosis and treatment, including health, employment, changes to physical appearance, etc.      Spirituality: Pt would not like a blessing ceremony while inpatient. SW explained that there are Chaplains on the unit and pt can request to meet with a  at anytime.     Coping: Pt noted that he is currently feeling \"frustrated and ready to begin\". Pt shared that his main coping mechanisms are exercise (riding bike). SW and pt discussed additional positive coping mechanisms that pt can utilize while in the hospital. While hospitalized, pt plans to walk the " hallways and watch tv.      Education Provided: Transplant process expectations, Caregiver requirements, Caregiver self-care, Financial issues related to transplant, Financial resources/grants available, Common psychosocial stressors pre/post transplant, Support group(s) available, Hospital resources available, Social Work role and Resources for grandchild.     Interventions Provided: Psychosocial Support and Education      Assessment and Recommendations for Team:  Pt is a 58 year old male diagnosed with multiple myeloma who is here undergoing preparation for a planned autologous transplant. Pt appeared to be in good spirits during conversation. Pt is pleasant and able to articulate concerns/coping mechanisms in an appropriate manner. Pt feels comfortable communicating with the medical team. Pt has a good supportive network of family and friends who are involved. Pt has developed a few coping mechanisms such as exercising and working on his hobbies. Pt may benefit from assistance in developing coping mechanisms. Pt and pt's family will benefit from ongoing psychosocial support in regards to coping with the adjustment to the BMT process.      Pt has a good support system. Please see caregiver section for update. Pt verbalizes understanding of the transplant process and wanting to proceed. SW provided contact information and encouraged pt to contact SW with questions, concerns, resources and for support.     Per this assessment, SW did not identify any barriers to this patient moving forward with transplant.     Important Information:   -See caregiver section. Check in with pt while inpatient to re-confirm plan.  -Pt said he plans to drive to clinic appointments post-transplant. Dr. Lucio notified and Dr. Lucio was going to tell pt he cannot drive.     Follow up Planned:   Psychosocial support

## 2019-11-11 NOTE — PROGRESS NOTES
[facility]    Transplant Infectious Diseases Inpatient Progress note      Angel Yanez MRN# [id number]   YOB: 1960 Age: 59 year old   Date of Admission: 11/9/2019  1:25 PM             Recommendations:   1. Next generation bacterial, mycobacterial, fungal PCR on LN biopsy from 10/18/2019 ordered for you.   2. I think the patient will need an excisional LN biopsy of the most prominent LN.   - if this is done, please send for pathology, leukemia/lymphoma, Gram stain, aerobic cx, anaerobic cx, fungal cx, AFB smear and cx, and send out to [facility] for next generation bacterial, mycobacterial, fungal PCR.   3. Urine and blood for coccidioides Ag (ordered for you).   4. Agree with repeat urine Histoplasma Ag.   5. Repeat CRAG (ordered for you).  6. Rickettsiae serology (ordered).   7. Discontinue cefepime.         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         Active Problems and Infectious Diseases Issues:   1. FUO  Extensive infectious workup has been negative.   I think the most salient findings are fever, thrombocytopenia and LA, thus if there is no response to eltrombopag it is reasonable to attempt excisional LN to rule out lymphoma or infectious process.   It is possible that smoldering infectious process not responding to itraconazole due to resistance to subtherapeutic levels of itraconazole. Will wait for itraconazole levels. Agree with repeating urinary Histoplasma.   With history of travel to [facility] will check Coccidioides Ag.   We are attempting to add on universal PCR to LN biopsy from 10/18/2019.   With history of recurrent fever, ? Response to doxycycline, and travel to [place], Rickettsial disease should be ruled out.      With extensive workup and no neutropenia, please discontinue cefepime.         Old Problems and Infectious Diseases Issues:    1. P aeruginosa BSI due to line infection treated with cefepime and removal of the line.     Other Infectious Disease issues include:  - on itraconazole starting 10/18/2019.   - PCP prophylaxis: none.   - Serostatus: CMV+, EBV+, HSV1-/2+, VZV ?      Attestation:  I interviewed the patient and obtained history from the patient, and by reviewing the patient's chart including outside records, microbiological data, and radiological data. All data are summarized in this notes.  Collin Albarado MD   Pager: [phone]  11/11/2019      Interim History and Events:   Was started on eltrombopag for possible ITP.   Looks very distress due to fever and shortness of breath. He looked very tired and frustrated.   He coughs only with fever. And he's dyspneic only with fever.   No diarrhea, N/V.   No dysuria. No rash.   No other complaints.       ROS:  As mentioned in the interim history otherwise negative by reviewing constitutional symptoms, central and peripheral neurological systems, respiratory system, cardiac system, GI system,  system, musculoskeletal, skin, allergy, and lymphatics.                 Pysical Examination:  Temp: 100.3  F (37.9  C) Temp src: Axillary BP: (!) 142/80 Pulse: 53 Heart Rate: 99 Resp: 20 SpO2: 93 % O2 Device: None (Room air)    Vitals:    11/11/19 0827   Weight: 79.2 kg (174 lb 11.2 oz)       Constitutional: awake, alert, cooperative, looked distressed due to fever, appears at stated age, well nourished.   Head, ENT, Eyes, and Neck: Normocephalic, sinuses non-tender to palpation, external ears without lesions, moist buccal mucosa without oral thrush, tonsils without swelling, erythema, or exudate, no tenderness palpating teeth, good dentition, gums without necrosis or abscesses.   PERRL, EOMI, pink conjunctivae, non-icteric sclera.   Neck supple without rigidity, no cervical/axillary/inguinal LA bilaterally.    Neurologic: Patient is moving all extremities without focal deficit, no focal sensory  loss.   Lungs: CTA bilaterally, no accessory muscle use, no dullness to percussion and no abnormal tactile fremitus.   CVS: RRR, normal S1/S2, no murmur, PMI was not displaced.   Abdomen: non-tender, non-distended, no masses, no bruit, no shifting dullness, normal BS.   Extremities: no pitting edema of bilateral lower extremities, no ulcers, normal ROM of all joints, no swelling or erythema of any of joints and no tenderness to palpation.   Skin: no induration, fluctuation or discharge, and no rash       Medications:  Medications that Require Transfusion:     sodium chloride 10 mL/hr at 11/11/19 0942     Scheduled Medications:     acyclovir  800 mg Oral BID     ceFEPIme (MAXIPIME) IV  2 g Intravenous Q8H     celecoxib  100 mg Oral BID     eltrombopag  50 mg Oral Daily     itraconazole  200 mg Oral BID     PARoxetine  20 mg Oral QAM     phytonadione  10 mg Oral Daily       Laboratory Data:   No results found for: ACD4    Inflammatory Markers    Recent Labs   Lab Test 11/10/19  0413 09/25/19  1022   *  --    .0* 160.0*       Immune Globulin Studies     Recent Labs   Lab Test 11/06/19  1051 10/16/19  0930 09/25/19  1018 08/23/19  1301 06/11/19  0918 05/06/19  0936 01/29/19  0810 01/14/19  0945   IGG 1,210 920 507* 824 572* 636* 826 864   IGM 31*  --   --  25* 20* 26* 43* 50*   IGE  --   --   --   --   --  3  --  6   IGA 80  --   --  15* 41* 45* 311 327       Metabolic Studies       Recent Labs   Lab Test 11/11/19  0854 11/10/19  0413 11/09/19  1800 11/09/19  1112 11/08/19  1043 11/06/19  1051 11/01/19  0855  10/20/19  1736 10/20/19  0359    134  --  134 136 139 140   < >  --  136   POTASSIUM 2.9* 3.4  --  3.7 4.0 3.9 4.6   < >  --  3.0*   CHLORIDE 106 104  --  102 104 107 107   < >  --  105   CO2 22 25  --  25 25 27 29   < >  --  23   ANIONGAP 8 6  --  8 8 6 4   < >  --  8   BUN 22 18  --  16 10 12 12   < >  --  16   CR 0.74 0.99  --  1.21 0.96 0.81 0.78   < >  --  0.79   GFRESTIMATED >90 82  --  64  85 >90 >90   < >  --  >90   * 144*  --  133* 118* 92 91   < >  --  92   ZHEN 8.0* 8.0*  --  8.8 8.6 9.0 9.2   < >  --  8.1*   PHOS  --   --   --   --   --  3.5  --   --   --  3.2   MAG  --   --   --   --   --  2.2  --   --   --  1.9   LACT  --   --  1.6  --   --   --   --   --  1.7  --     < > = values in this interval not displayed.       Hepatic Studies    Recent Labs   Lab Test 11/10/19  0413 11/06/19  1051 11/01/19  0855 10/29/19  0921 10/25/19  1257 10/16/19  0930   BILITOTAL 0.8 0.8 0.7 0.6 0.5 0.8   ALKPHOS 101 124 135 153* 207* 114   ALBUMIN 2.6* 3.7 3.5 3.3* 3.2* 3.2*   AST 10 14 17 13 16 13   ALT 17 24 28 30 36 22   * 196  --   --   --  214       Pancreatitis testing    Recent Labs   Lab Test 11/11/19  0854 10/22/19  0355   TRIG 138 186*       Hematology Studies      Recent Labs   Lab Test 11/11/19  0854 11/10/19  1950 11/10/19  1102 11/10/19  0413 11/09/19  1112 11/08/19  1043   WBC 2.5* 1.8* 2.2* 2.8* 5.8 5.7   ANEU 2.0 1.5* 1.9 2.4 4.1 4.0   ALYM 0.1* 0.2* 0.1* 0.1* 0.9 0.6*   NEGAR 0.1 0.1 0.1 0.0 0.2 0.4   AEOS 0.4 0.1 0.1 0.2 0.5 0.5   HGB 7.6* 7.3* 6.6* 6.8* 8.3* 8.0*   HCT 23.2* 22.3* 19.7* 21.0* 24.6* 23.9*   PLT 16* 17* 13* 8* 6* 12*       Arterial Blood Gas Testing    Recent Labs   Lab Test 01/14/19  1029   PH 7.43   PCO2 36   PO2 90   HCO3 23   O2PER 21        Urine Studies     Recent Labs   Lab Test 11/10/19  1410 11/08/19  1148 09/25/19  0822 09/23/19  1757 07/21/19 2013   URINEPH 6.0 5.0 7.5* 6.0 6.0   NITRITE Negative Negative Negative Negative Negative   LEUKEST Negative Negative Negative Negative Negative   WBCU 2 3 <1 1 1       Microbiology:  Blood cx negative.     Last check of C difficile  C Diff Toxin B PCR   Date Value Ref Range Status   05/24/2019 Negative NEG^Negative Final     Comment:     Negative: Clostridium difficile target DNA sequences NOT detected, presumed   negative for Clostridium difficile toxin B or the number of bacteria present   may be below the limit  of detection for the test.  FDA approved assay performed using [employer] GeneXpert real-time PCR.  A negative result does not exclude actual disease due to Clostridium difficile   and may be due to improper collection, handling and storage of the specimen   or the number of organisms in the specimen is below the detection limit of the   assay.         Virology:  CMV viral loads  No results found for: 62519, 64119, 38288, 13592  CMV viral loads    Recent Labs   Lab Test 10/16/19  0930 10/08/19  1301   CSPEC Plasma, EDTA anticoagulant Plasma, EDTA anticoagulant   CMVLOG Not Calculated Not Calculated       CMV viral loads    Log IU/mL of CMVQNT   Date Value Ref Range Status   10/16/2019 Not Calculated <2.1 [Log_IU]/mL Final   10/08/2019 Not Calculated <2.1 [Log_IU]/mL Final   09/25/2019 Not Calculated <2.1 [Log_IU]/mL Final   08/23/2019 Not Calculated <2.1 [Log_IU]/mL Final   08/06/2019 Not Calculated <2.1 [Log_IU]/mL Final   07/15/2019 Not Calculated <2.1 [Log_IU]/mL Final   07/08/2019 Not Calculated <2.1 [Log_IU]/mL Final   07/01/2019 Not Calculated <2.1 [Log_IU]/mL Final   06/24/2019 Not Calculated <2.1 [Log_IU]/mL Final   06/17/2019 Not Calculated <2.1 [Log_IU]/mL Final   06/07/2019 Not Calculated <2.1 [Log_IU]/mL Final   05/29/2019 Not Calculated <2.1 [Log_IU]/mL Final   05/17/2019 Not Calculated <2.1 [Log_IU]/mL Final       CMV resistance testing  No lab results found.  No results found for: CMVCID, CMVFOS, CMVGAN     No results found for: H6RES    EBV DNA Copies/mL   Date Value Ref Range Status   10/16/2019 2,725 (A) EBVNEG^EBV DNA Not Detected [Copies]/mL Final   10/08/2019 1,203 (A) EBVNEG^EBV DNA Not Detected [Copies]/mL Final   09/25/2019 2,215 (A) EBVNEG^EBV DNA Not Detected [Copies]/mL Final       BK viral loads No lab results found.    Imaging:  CT c/a/p 11/9/2019  IMPRESSION: In this patient with history of multiple myeloma status  post bone marrow transplant:  1. No specific findings to explain patient's  fever of unknown origin.   2. There is a 1.6 cm hypoenhancing ill-defined focus in the superior  pole the right kidney. This is thought to represent sequela of prior  inflammation. Occult malignancy is thought less likely but not  entirely excluded. Recommend follow-up MRI in outpatient setting.  3. The spine is severely demineralized with multilevel compression  fractures which are stable.  4. Stable bilateral axillary, mediastinal, and superficial inguinal  lymphadenopathy.  5. Stable small peripheral ill-defined hypodensities within the  spleen, nonspecific and may be infarctions.        Collin Albarado MD  Pager: [phone]

## 2019-11-11 NOTE — PLAN OF CARE
/78 (BP Location: Left arm)   Pulse 53   Temp 97.4  F (36.3  C) (Axillary)   Resp 16   SpO2 96%     VSS, afebrile overnight. He is AOx4 and independent. No complaints of N/V/D or pain. Voiding spontaneously. Will continue to follow the POC.      Problem: Fever (Fever with Neutropenia)  Goal: Baseline Body Temperature  11/11/2019 0529 by Margarette Hopper, RN  Outcome: Improving  11/10/2019 2158 by Brianne Issa RN  Outcome: No Change     Problem: Adult Inpatient Plan of Care  Goal: Plan of Care Review  11/11/2019 0529 by Margarette Hopper, RN  Outcome: No Change  11/10/2019 2158 by Brianne Issa, RN  Outcome: No Change

## 2019-11-11 NOTE — PROGRESS NOTES
BMT Progress Note    ID: Angel Yanez is a 60 yo man D+178 s/p 2nd autologous transplant for MM. S/p 2 recent readmissions for FUO (10/16-10/23; 9/23-9/29/19).     HPI: fever this am. Now down to 102. Some SOB with fevers. He notices more SOB with activity even when fever done. This is ongoing and not worsening. No cold/cough symptoms. No new issues this am.     ROS: Negative except as stated above    Physical Exam  /68 (BP Location: Left arm)   Pulse 53   Temp 101.2  F (38.4  C) (Axillary)   Resp 16   Wt 79.2 kg (174 lb 11.2 oz)   SpO2 95%   BMI 24.50 kg/m       Constitutional: lying in bed, some SOB noted with speaking.  HEENT: MMM, EOM intact, sclerae clear and anicteric  Heart: Tachycardic but RR, no murmurs appreciated  Resp: BCTA. On RA.  Abd: Soft, non-tender, BS+  Lymph: No peripheral edema  Skin: no rash   Neuro: No focal deficits    Labs  Lab Results   Component Value Date    WBC 1.8 (L) 11/10/2019    ANEU 1.5 (L) 11/10/2019    HGB 7.3 (L) 11/10/2019    HCT 22.3 (L) 11/10/2019    PLT 17 (LL) 11/10/2019     11/10/2019    POTASSIUM 3.4 11/10/2019    CHLORIDE 104 11/10/2019    CO2 25 11/10/2019     (H) 11/10/2019    BUN 18 11/10/2019    CR 0.99 11/10/2019    MAG 2.2 11/06/2019    INR 1.40 (H) 11/09/2019    BILITOTAL 0.8 11/10/2019    AST 10 11/10/2019    ALT 17 11/10/2019    ALKPHOS 101 11/10/2019    PROTTOTAL 6.0 (L) 11/10/2019    ALBUMIN 2.6 (L) 11/10/2019     A/P:  Angel Ynaez is a 60 yo man D+178 s/p 2nd autologous transplant for MM. S/p 2 recent readmissions for FUO (10/16-10/23; 9/23-9/29/19).      1.  BMT/MM:   Slow engraftment; cell dose was only 0.639x10^6. (Marrow [place] - known poor cell dose as failed chemo-mobilization 1/2019.)   - Stringent CR.  - day +180 bone marrow biopsy 11/6 which showed no morphologic or immunophenotypic evidence of plasma cell neoplasm.   - PET in 8/2019 clear.      2.  HEME: Keep Hgb>8g/dL, plt >10-20 (epistaxis requiring rhinorocket  packing in last admission).   - anemic, thrombocytopenic post BMT, low cell dose, +/- infection.   - intermittent neutropenia: GCSF last on 11/6; give prn ANC </=1000.   - No evidence of HLH on lymphnode bx 10/18/19.     - Persistent thrombocytopenia: BM 11/6/19 showed adequate granulopoiesis and erythropoiesis but his platelet precursors were 0 to absent which is reflected in peripheral CBC. Started promactic 50mcg daily on 11/10  - Hemolysis eval (smear, LDH, hapto, ROMMEL) = pending  - long INR. Added vit K 10mg PO daily. Repeat INR tomorrow.      3.  ID: FUO: tmax 103.4.  Persistent fevers: extensive ID work up unremarkable. 10/16 repeat chest CT: persistent mediastinal adenopathy no paranchymal disease. S/P bx of L axillary node on 10/18: No evidence of HLH, tissues looks reactive.  - ID consulted 11/10, appreciate recs. Repeat CTcap from 11/9 shows no significant change from prior to explain fevers. Cont cefepime. Blood cx, fungal cx, AFB cx, lyme antibodies = pending. Autoimmune workup (BINU, RF, ESR, anti citrullinated, anti double stranded DNA) = pending.  - check itra level.  - Per ID, possible histoplasmosis: empiric itraconazole 200 TID started last admission with fevers initially resolved but now recurred; currently on bid dosing. Pt declining recommened ID follow-up in December. 10/18 Histo/blasto urine antigen negative. Per Dr Kelley flucaonzole can suppress growth? 10/18 [place] DNA test- negative.   - + Micrococcus from 10/20 blood culture:  Treat with oral levaquin 500mg daily x 7d (11/1-11/7).    - Per Dr. Jones, his recurring fevers might be autoimmune/inflammatory in nature - received pred 40mg x 1 on 11/10. Dr. Godwin prefers not to continue this. Scheduled celebrex BID.  - prophy ACV, Pentamidine (last 10/17/19)  - On CMV vaccine trial.   - CMV neg and EBV 2725 (10/16). Parvo 10/18 neg.   - FUO continues despite extensive ID and malignant w/u so will consult Rheumatology. Checking PSA.     4.  Pulm:   - tachypnea: mainly with fevers. On RA. Lungs CTA and imaging w/ contrast neg.   - 10/17: Echo with preserved EF, no evidence of right heart strain.  - 10/17 VQ scan neg for PE.     5. GI:    - LFTs are wnl     5.  FEN/Renal:   - Cr better at 0.99  - Lyte replacement per scheduled protocol.       6.  Mood: Continue Paxil.    Cassie Ferrari PA-C  #[id number]    The patient was seen and examined by me separately from the midlevel provider. The note reflects our mutual assessment and plans and were approved by me personally.   I personally reviewed today's lab results vital signs and radiology results.    Each point of the assessment and plan were reviewed by the midlevel and me and either endorsed by me or were my added decisions.    My pertinent physical findings today are: Febrile, clear lungs and no rash.    My assessment and plan are: 60 yo 180 day post second auto PBSC for myeloma admitted for w/u FUO. CT CAP -. On itraconozole for possible  Histoplasmosis. Do connective tissue disease autoimmune w/u (R/O rheumatoid arthritis, SLE ans also Lyme disease.) Consult ID. Schedule celebrex for comfort. Consult rheumatology. Increase celebres to 100 mg po BID. Check PSA.    Stephan Godwin M.D.

## 2019-11-12 LAB
ANION GAP SERPL CALCULATED.3IONS-SCNC: 7 MMOL/L (ref 3–14)
ANISOCYTOSIS BLD QL SMEAR: ABNORMAL
BASOPHILS # BLD AUTO: 0 10E9/L (ref 0–0.2)
BASOPHILS NFR BLD AUTO: 0 %
BUN SERPL-MCNC: 15 MG/DL (ref 7–30)
CALCIUM SERPL-MCNC: 8.1 MG/DL (ref 8.5–10.1)
CD3 CELLS # BLD: 408 CELLS/UL (ref 603–2990)
CD3 CELLS NFR BLD: 91 % (ref 49–84)
CD3+CD4+ CELLS # BLD: 146 CELLS/UL (ref 441–2156)
CD3+CD4+ CELLS NFR BLD: 33 % (ref 28–63)
CD3+CD4+ CELLS/CD3+CD8+ CLL BLD: 0.59 % (ref 1.4–2.6)
CD3+CD8+ CELLS # BLD: 252 CELLS/UL (ref 125–1312)
CD3+CD8+ CELLS NFR BLD: 56 % (ref 10–40)
CHLORIDE SERPL-SCNC: 105 MMOL/L (ref 94–109)
CO2 SERPL-SCNC: 24 MMOL/L (ref 20–32)
CREAT SERPL-MCNC: 0.8 MG/DL (ref 0.66–1.25)
DIFFERENTIAL METHOD BLD: ABNORMAL
ENA JO1 IGG SER-ACNC: <0.2 AI (ref 0–0.9)
EOSINOPHIL # BLD AUTO: 0.3 10E9/L (ref 0–0.7)
EOSINOPHIL NFR BLD AUTO: 9.8 %
ERYTHROCYTE [DISTWIDTH] IN BLOOD BY AUTOMATED COUNT: 25.1 % (ref 10–15)
GFR SERPL CREATININE-BSD FRML MDRD: >90 ML/MIN/{1.73_M2}
GLUCOSE SERPL-MCNC: 96 MG/DL (ref 70–99)
HCT VFR BLD AUTO: 27.2 % (ref 40–53)
HGB BLD-MCNC: 9 G/DL (ref 13.3–17.7)
IFC SPECIMEN: ABNORMAL
INR PPP: 1.27 (ref 0.86–1.14)
LYMPHOCYTES # BLD AUTO: 0.3 10E9/L (ref 0.8–5.3)
LYMPHOCYTES NFR BLD AUTO: 9.8 %
MCH RBC QN AUTO: 31.6 PG (ref 26.5–33)
MCHC RBC AUTO-ENTMCNC: 33.1 G/DL (ref 31.5–36.5)
MCV RBC AUTO: 95 FL (ref 78–100)
MONOCYTES # BLD AUTO: 0 10E9/L (ref 0–1.3)
MONOCYTES NFR BLD AUTO: 1.8 %
NEUTROPHILS # BLD AUTO: 2.1 10E9/L (ref 1.6–8.3)
NEUTROPHILS NFR BLD AUTO: 78.6 %
NRBC # BLD AUTO: 0 10*3/UL
NRBC BLD AUTO-RTO: 1 /100
PLATELET # BLD AUTO: 29 10E9/L (ref 150–450)
PLATELET # BLD EST: ABNORMAL 10*3/UL
POTASSIUM SERPL-SCNC: 3.5 MMOL/L (ref 3.4–5.3)
POTASSIUM SERPL-SCNC: 3.7 MMOL/L (ref 3.4–5.3)
PSA SERPL-ACNC: 0.19 UG/L (ref 0–4)
RBC # BLD AUTO: 2.85 10E12/L (ref 4.4–5.9)
SODIUM SERPL-SCNC: 136 MMOL/L (ref 133–144)
WBC # BLD AUTO: 2.7 10E9/L (ref 4–11)

## 2019-11-12 PROCEDURE — 80299 QUANTITATIVE ASSAY DRUG: CPT | Performed by: PHYSICIAN ASSISTANT

## 2019-11-12 PROCEDURE — 84999 UNLISTED CHEMISTRY PROCEDURE: CPT | Performed by: INTERNAL MEDICINE

## 2019-11-12 PROCEDURE — 86790 VIRUS ANTIBODY NOS: CPT | Performed by: INTERNAL MEDICINE

## 2019-11-12 PROCEDURE — 25000132 ZZH RX MED GY IP 250 OP 250 PS 637: Performed by: INTERNAL MEDICINE

## 2019-11-12 PROCEDURE — 86757 RICKETTSIA ANTIBODY: CPT | Performed by: INTERNAL MEDICINE

## 2019-11-12 PROCEDURE — 80048 BASIC METABOLIC PNL TOTAL CA: CPT | Performed by: INTERNAL MEDICINE

## 2019-11-12 PROCEDURE — 20600000 ZZH R&B BMT

## 2019-11-12 PROCEDURE — 86901 BLOOD TYPING SEROLOGIC RH(D): CPT | Performed by: INTERNAL MEDICINE

## 2019-11-12 PROCEDURE — 87533 HHV-6 DNA QUANT: CPT | Performed by: INTERNAL MEDICINE

## 2019-11-12 PROCEDURE — 86255 FLUORESCENT ANTIBODY SCREEN: CPT | Performed by: PHYSICIAN ASSISTANT

## 2019-11-12 PROCEDURE — G0103 PSA SCREENING: HCPCS | Performed by: INTERNAL MEDICINE

## 2019-11-12 PROCEDURE — 25000132 ZZH RX MED GY IP 250 OP 250 PS 637: Performed by: PHYSICIAN ASSISTANT

## 2019-11-12 PROCEDURE — 36415 COLL VENOUS BLD VENIPUNCTURE: CPT | Performed by: INTERNAL MEDICINE

## 2019-11-12 PROCEDURE — 87103 BLOOD FUNGUS CULTURE: CPT | Performed by: INTERNAL MEDICINE

## 2019-11-12 PROCEDURE — 25000125 ZZHC RX 250: Performed by: PHYSICIAN ASSISTANT

## 2019-11-12 PROCEDURE — 80299 QUANTITATIVE ASSAY DRUG: CPT | Performed by: INTERNAL MEDICINE

## 2019-11-12 PROCEDURE — 85025 COMPLETE CBC W/AUTO DIFF WBC: CPT | Performed by: INTERNAL MEDICINE

## 2019-11-12 PROCEDURE — 25000128 H RX IP 250 OP 636: Performed by: PHYSICIAN ASSISTANT

## 2019-11-12 PROCEDURE — 86666 EHRLICHIA ANTIBODY: CPT | Performed by: INTERNAL MEDICINE

## 2019-11-12 PROCEDURE — 86900 BLOOD TYPING SEROLOGIC ABO: CPT | Performed by: INTERNAL MEDICINE

## 2019-11-12 PROCEDURE — 25000128 H RX IP 250 OP 636: Performed by: INTERNAL MEDICINE

## 2019-11-12 PROCEDURE — 86359 T CELLS TOTAL COUNT: CPT | Performed by: PHYSICIAN ASSISTANT

## 2019-11-12 PROCEDURE — 85610 PROTHROMBIN TIME: CPT | Performed by: INTERNAL MEDICINE

## 2019-11-12 PROCEDURE — 87799 DETECT AGENT NOS DNA QUANT: CPT | Performed by: INTERNAL MEDICINE

## 2019-11-12 PROCEDURE — 87040 BLOOD CULTURE FOR BACTERIA: CPT | Performed by: INTERNAL MEDICINE

## 2019-11-12 PROCEDURE — 86923 COMPATIBILITY TEST ELECTRIC: CPT | Performed by: INTERNAL MEDICINE

## 2019-11-12 PROCEDURE — 87449 NOS EACH ORGANISM AG IA: CPT | Performed by: INTERNAL MEDICINE

## 2019-11-12 PROCEDURE — 36415 COLL VENOUS BLD VENIPUNCTURE: CPT | Performed by: PHYSICIAN ASSISTANT

## 2019-11-12 PROCEDURE — 84132 ASSAY OF SERUM POTASSIUM: CPT | Performed by: PHYSICIAN ASSISTANT

## 2019-11-12 PROCEDURE — 86360 T CELL ABSOLUTE COUNT/RATIO: CPT | Performed by: PHYSICIAN ASSISTANT

## 2019-11-12 PROCEDURE — 86850 RBC ANTIBODY SCREEN: CPT | Performed by: INTERNAL MEDICINE

## 2019-11-12 RX ORDER — FUROSEMIDE 10 MG/ML
20 INJECTION INTRAMUSCULAR; INTRAVENOUS ONCE
Status: COMPLETED | OUTPATIENT
Start: 2019-11-12 | End: 2019-11-12

## 2019-11-12 RX ADMIN — ACYCLOVIR 800 MG: 800 TABLET ORAL at 19:39

## 2019-11-12 RX ADMIN — ITRACONAZOLE 200 MG: 100 CAPSULE ORAL at 18:37

## 2019-11-12 RX ADMIN — ELTROMBOPAG OLAMINE 50 MG: 50 TABLET, FILM COATED ORAL at 07:56

## 2019-11-12 RX ADMIN — ITRACONAZOLE 200 MG: 100 CAPSULE ORAL at 07:56

## 2019-11-12 RX ADMIN — PHYTONADIONE 10 MG: 10 INJECTION, EMULSION INTRAMUSCULAR; INTRAVENOUS; SUBCUTANEOUS at 07:56

## 2019-11-12 RX ADMIN — ACYCLOVIR 800 MG: 800 TABLET ORAL at 07:56

## 2019-11-12 RX ADMIN — ACETAMINOPHEN 650 MG: 325 TABLET, FILM COATED ORAL at 16:56

## 2019-11-12 RX ADMIN — CEFEPIME 2 G: 2 INJECTION, POWDER, FOR SOLUTION INTRAVENOUS at 00:34

## 2019-11-12 RX ADMIN — ACETAMINOPHEN 650 MG: 325 TABLET, FILM COATED ORAL at 03:59

## 2019-11-12 RX ADMIN — CELECOXIB 100 MG: 100 CAPSULE ORAL at 19:39

## 2019-11-12 RX ADMIN — CELECOXIB 100 MG: 100 CAPSULE ORAL at 07:56

## 2019-11-12 RX ADMIN — PAROXETINE HYDROCHLORIDE HEMIHYDRATE 20 MG: 20 TABLET, FILM COATED ORAL at 07:56

## 2019-11-12 RX ADMIN — FUROSEMIDE 20 MG: 10 INJECTION, SOLUTION INTRAVENOUS at 09:56

## 2019-11-12 RX ADMIN — ACETAMINOPHEN 650 MG: 325 TABLET, FILM COATED ORAL at 12:55

## 2019-11-12 RX ADMIN — CEFEPIME 2 G: 2 INJECTION, POWDER, FOR SOLUTION INTRAVENOUS at 07:56

## 2019-11-12 ASSESSMENT — ACTIVITIES OF DAILY LIVING (ADL)
ADLS_ACUITY_SCORE: 11

## 2019-11-12 NOTE — PLAN OF CARE
Continue to have temp 100.7, administrated tylenol PO per ordered, had fair oral intake, SOB with moving, administrated lasix 20 mg IV after that good response, continue to monitor

## 2019-11-12 NOTE — PROGRESS NOTES
"BMT Progress Note    ID: Angel Yanez is a 58 yo man D+179 s/p 2nd autologous transplant for MM. S/p 2 recent readmissions for FUO (10/16-10/23; 9/23-9/29/19).     HPI: Feeling a little better this am but yesterday felt \"crappy\" all day. SOB does seem to be worsening some. Dry cough at times. Fever curve better with celebrex. Eating/drinking okay.     ROS: Negative except as stated above    Physical Exam  /72 (BP Location: Left arm)   Pulse 84   Temp 98.8  F (37.1  C) (Axillary)   Resp 18   Wt 81.4 kg (179 lb 7.3 oz)   SpO2 96%   BMI 25.17 kg/m       Constitutional: lying in bed, some SOB noted with speaking.  HEENT: MMM, EOM intact, sclerae clear and anicteric  Heart: Tachycardic but RR, no murmurs appreciated  Resp: On RA +bibasilar to almost mid lung field crackles.   Abd: Soft, non-tender, BS+  Lymph: No peripheral edema  Skin: no rash   Neuro: No focal deficits    Labs  Lab Results   Component Value Date    WBC 2.7 (L) 11/12/2019    ANEU 2.1 11/12/2019    HGB 9.0 (L) 11/12/2019    HCT 27.2 (L) 11/12/2019    PLT 29 (LL) 11/12/2019     11/12/2019    POTASSIUM 3.7 11/12/2019    CHLORIDE 105 11/12/2019    CO2 24 11/12/2019    GLC 96 11/12/2019    BUN 15 11/12/2019    CR 0.80 11/12/2019    MAG 2.2 11/06/2019    INR 1.27 (H) 11/12/2019    BILITOTAL 0.8 11/10/2019    AST 10 11/10/2019    ALT 17 11/10/2019    ALKPHOS 101 11/10/2019    PROTTOTAL 6.0 (L) 11/10/2019    ALBUMIN 2.6 (L) 11/10/2019     A/P:  Angel Yanez is a 58 yo man D+179 s/p 2nd autologous transplant for MM. S/p 2 recent readmissions for FUO (10/16-10/23; 9/23-9/29/19).      1.  BMT/MM:   Slow engraftment; cell dose was only 0.639x10^6. ([facility] [place] - known poor cell dose as failed chemo-mobilization 1/2019.)   - Stringent CR.  - day +180 bone marrow biopsy 11/6 which showed no morphologic or immunophenotypic evidence of plasma cell neoplasm.   - PET in 8/2019 clear.      2.  HEME: Keep Hgb>8g/dL, plt >20 (epistaxis requiring " rhinorocket packing in last admission).   - anemic, thrombocytopenic post BMT, low cell dose, +/- infection.   - intermittent neutropenia: GCSF last on 11/6; give prn ANC </=1000.   - No evidence of HLH on lymphnode bx 10/18/19.     - Persistent thrombocytopenia: BM 11/6/19 showed adequate granulopoiesis and erythropoiesis but his platelet precursors were 0 to absent which is reflected in peripheral CBC. Started promactic 50mcg daily on 11/10  - Hemolysis eval neg.   - prolonged INR. Added vit K 10mg PO daily. Repeat INR today improved at 1.2.       3.  ID: FUO: tmax 101.4 with scheduled celebrex.  A.) Extensive ID work up unremarkable x 2 sept and oct. 10/16 and 11/9 repeat chest CT: persistent mediastinal adenopathy no paranchymal disease. S/P bx of L axillary node on 10/18: No evidence of HLH, tissues looks reactive.   - ID consulted 11/10, appreciate recs. Extensive ID testing negative but also lots still pending. Following cx results daily. Considering need to excisional lymph node bx (neg core bx 10/18).   - HTLV pending with very low lymphocyte count. CD4 ordered.    - Per ID with negative w/u and not neutropenic will discontinue cefepime today.   - Per ID, possible histoplasmosis: empiric itraconazole 200 TID started last admission with fevers initially resolved but now recurred; currently on bid dosing. Pt declining recommened ID follow-up in December. 10/18 Histo/blasto urine antigen negative. Per Dr Kelley flucaonzole can suppress growth? 10/18 Kairus DNA test- negative.   - prophy ACV, Pentamidine (last 10/17/19)    B.) Autoimmune workup (BINU, RF, ESR, anti citrullinated, anti double stranded DNA) so far negative. Rheum consult 11/11 feels autoimmune disease less likely with neg studies above and CRP and ESR elevated. Rec possible CNS w/u for infection. We decided to add ANCA to r/o vasculitis even though doesn't fit clinical picture.     C.) Malignancy W/U: Neg BM BX 11/6. Neg PET in 8/2019. Neg core  lymph node needle bx 10/18. PSA neg.      4. Pulm:   - tachypnea/SOB: mainly with fevers. On RA. Lungs CTA and imaging w/ contrast neg. Today crackles. Fluid up. Lasix 20mg IV.   - 10/17: Echo with preserved EF, no evidence of right heart strain.  - 10/17 VQ scan neg for PE.     5. GI:    - LFTs are wnl     5.  FEN/Renal:   - Lyte replacement per scheduled protocol.       6.  Mood: Continue Paxil.    JESICA StarkC  #[id number]    The patient was seen and examined by me separately from the midlevel provider. The note reflects our mutual assessment and plans and were approved by me personally.   I personally reviewed today's lab results vital signs and radiology results.    Each point of the assessment and plan were reviewed by the midlevel and me and either endorsed by me or were my added decisions.    My pertinent physical findings today are: Febrile, clear lungs and no rash.    My assessment and plan are: 60 yo 180 day post second auto PBSC for myeloma admitted for w/u FUO. CT CAP -. On itraconozole for possible  Histoplasmosis. Do connective tissue disease autoimmune w/u (R/O rheumatoid arthritis, SLE ans also Lyme disease.) Consult ID. Schedule celebrex for comfort. Consult rheumatology. Increase celebres to 100 mg po BID. Check PSA. Appreciate  ID and rheum recommendation for extensive w/u: will implement. Consider repeat lymph node biopsy.    Stephan Godwin M.D.

## 2019-11-12 NOTE — PLAN OF CARE
/73 (BP Location: Left arm)   Pulse 84   Temp 101.4  F (38.6  C) (Axillary)   Resp 18   Wt 79.2 kg (174 lb 11.2 oz)   SpO2 92%   BMI 24.50 kg/m      T-max 101.4. No complaints of N/V/D or pain.He is AOx4 and independent. He received platelets during the evening. No replacements this AM. Will continue to follow the POC.      Problem: Adult Inpatient Plan of Care  Goal: Plan of Care Review  11/12/2019 0528 by Margarette Hopper, RN  Outcome: No Change  11/11/2019 1814 by Rosana Esteves, RN  Outcome: No Change

## 2019-11-12 NOTE — PROGRESS NOTES
RHEUMATOLOGY PROGRESS NOTE - Resident     Angel Yanez MRN# [id number]   Age: 59 year old YOB: 1960     Date of Admission:  11/9/2019    Assessment and Plan:   Summary: Angel Yanez is a 58 yo man D+178 s/p 2nd autologous PBSCT (cytoxan/melphalan) transplant for MM. S/p 2 recent readmissions for FUO (10/16-10/23; 9/23-9/29/19). Extensive infectious and cancer w/u has been done so far and all negative. Rheumatology was consulted for FUO.    Lymph node biopsy did not show evidence of HLH. Bone marrow biopsy 11/6 showed no morphologic or immunophenotypic evidence of plasma cell neoplasm. CRP in the 200's and ESR in the 100's makes infection more likely than autoimmune diseases.     Pt had history of psoriatic arthritis and was on methotrexate in the past. However, no evidence by physical exam or history of present inflammatory illness to support possible autoimmune disease flare with fever. Moreover, BINU, RF, anti CCP, ds DNA all negative. ANCA pending. CT chest showed fibrotic change. Not progressive compared to previous CT. Awaiting soluble IL-2RA and Sruthi-1. Finally, we found him having difficulty with finding words. Would recommend possible CNS infection work up in the setting of immunosuprressed state.    Interval discussion: Hands X-ray negative for erosive changes. Showed degenerative changes, especially in MCP joints.    Problem list:  FUO  MM s/p autologous PBSCT  anemia, thrombocytopenia post BMT  intermittent neutropenia  Hx of psoriatic arthritis   CT finding of fibrotic change in lungs, a 1.6 x 1.5 x 1.6 cm hypoenhancing ill-defined focus in the  superior pole of the right kidney    Recommendations:  -- Follow soluble IL-2RA, Jo1  -- Follow ANCA  -- Consider possible CNS infection work up  -- Consider work up for kidney finding (a 1.6 x 1.5 x 1.6 cm hypoenhancing ill-defined focus in the  superior pole of the right kidney)    The patient was seen and staffed with Dr. Edwards.     Yane  MD Lisa  Internal Medicine PGY-3  [phone]    Rheumatology Attending:    This patient was interviewed and examined in the presence of the medical resident, and this note reflects our mutual impression.     Adebayo Edwards MD  Professor of Medicine  Director, Division of Rheumatic and Autoimmune Diseases      Subjective/24 hour events:   No new changes, had fever last night but pt feels better. Denied joint pain, swelling, or rash.             Medications:     Current Facility-Administered Medications   Medication     acetaminophen (TYLENOL) tablet 325-650 mg     acetaminophen (TYLENOL) tablet 325-650 mg     acyclovir (ZOVIRAX) tablet 800 mg     albuterol (PROVENTIL) neb solution 2.5 mg     celecoxib (celeBREX) capsule 100 mg     eltrombopag (PROMACTA) tablet 50 mg     itraconazole (SPORANOX) capsule 200 mg     magnesium sulfate 4 g in 100 mL sterile water (premade)     meperidine (DEMEROL) injection 12.5 mg     naloxone (NARCAN) injection 0.1-0.4 mg     PARoxetine (PAXIL) tablet 20 mg     phytonadione (MEPHYTON/VITAMIN K) 1 MG/ML oral solution 10 mg     polyethylene glycol (MIRALAX/GLYCOLAX) Packet 17 g     potassium chloride (KLOR-CON) Packet 20-40 mEq     potassium chloride 10 mEq in 100 mL intermittent infusion with 10 mg lidocaine     potassium chloride 10 mEq in 100 mL sterile water intermittent infusion (premix)     potassium chloride 20 mEq in 50 mL intermittent infusion     potassium chloride ER (K-DUR/KLOR-CON M) CR tablet 20-40 mEq     potassium phosphate 15 mmol in D5W 250 mL intermittent infusion     potassium phosphate 20 mmol in D5W 250 mL intermittent infusion     potassium phosphate 20 mmol in D5W 500 mL intermittent infusion     potassium phosphate 25 mmol in D5W 500 mL intermittent infusion     prochlorperazine (COMPAZINE) injection 5-10 mg    Or     prochlorperazine (COMPAZINE) tablet 5-10 mg     sodium chloride 0.9% infusion            Review of Systems:   Complete 8 point ROS completed  and negative unless mentioned in subjective.          Physical Exam:   BP (!) 143/77   Pulse 91   Temp 100.6  F (38.1  C) (Axillary)   Resp 18   Wt 81.4 kg (179 lb 7.3 oz)   SpO2 95%   BMI 25.17 kg/m      Constitutional: lying in bed, NAD  HEENT: MMM, EOM intact, sclerae clear and anicteric  Heart: RRR, no murmurs appreciated  Resp: rales at base bilaterally+  Abd: Soft, non-tender, BS+  Extremities: No peripheral edema  Skin: no rash   MSK: MMT all 5/5, wrist flexion 50 degree, wrist extension 75 degree, other joints ROM full, no joints effusion or sign of synovitis.  Neuro: No focal deficits       Data:   Labs and imaging reviewed.     Yane Gonzalez MD  Internal Medicine PGY-3  [phone]

## 2019-11-13 ENCOUNTER — APPOINTMENT (OUTPATIENT)
Dept: GENERAL RADIOLOGY | Facility: CLINIC | Age: 59
DRG: 853 | End: 2019-11-13
Attending: INTERNAL MEDICINE
Payer: COMMERCIAL

## 2019-11-13 ENCOUNTER — APPOINTMENT (OUTPATIENT)
Dept: MRI IMAGING | Facility: CLINIC | Age: 59
DRG: 853 | End: 2019-11-13
Attending: PHYSICIAN ASSISTANT
Payer: COMMERCIAL

## 2019-11-13 LAB
ANCA AB PATTERN SER IF-IMP: NORMAL
ANION GAP SERPL CALCULATED.3IONS-SCNC: 8 MMOL/L (ref 3–14)
ANISOCYTOSIS BLD QL SMEAR: ABNORMAL
BASOPHILS # BLD AUTO: 0 10E9/L (ref 0–0.2)
BASOPHILS NFR BLD AUTO: 0 %
BLD PROD TYP BPU: NORMAL
BLD PROD TYP BPU: NORMAL
BLD UNIT ID BPU: 0
BLOOD PRODUCT CODE: NORMAL
BPU ID: NORMAL
BUN SERPL-MCNC: 17 MG/DL (ref 7–30)
C-ANCA TITR SER IF: NORMAL {TITER}
CALCIUM SERPL-MCNC: 8.2 MG/DL (ref 8.5–10.1)
CHLORIDE SERPL-SCNC: 102 MMOL/L (ref 94–109)
CMV DNA SPEC NAA+PROBE-ACNC: NORMAL [IU]/ML
CMV DNA SPEC NAA+PROBE-LOG#: NORMAL {LOG_IU}/ML
CO2 SERPL-SCNC: 24 MMOL/L (ref 20–32)
CREAT SERPL-MCNC: 0.8 MG/DL (ref 0.66–1.25)
DIFFERENTIAL METHOD BLD: ABNORMAL
EBV DNA # SPEC NAA+PROBE: <500 {COPIES}/ML
EBV DNA SPEC NAA+PROBE-LOG#: <2.7 {LOG_COPIES}/ML
EOSINOPHIL # BLD AUTO: 0.3 10E9/L (ref 0–0.7)
EOSINOPHIL NFR BLD AUTO: 10.2 %
ERYTHROCYTE [DISTWIDTH] IN BLOOD BY AUTOMATED COUNT: 24.2 % (ref 10–15)
GFR SERPL CREATININE-BSD FRML MDRD: >90 ML/MIN/{1.73_M2}
GLUCOSE SERPL-MCNC: 91 MG/DL (ref 70–99)
HADV DNA # SPEC NAA+PROBE: NORMAL COPIES/ML
HADV DNA SPEC NAA+PROBE-LOG#: NORMAL LOG COPIES/ML
HCT VFR BLD AUTO: 27.1 % (ref 40–53)
HGB BLD-MCNC: 8.9 G/DL (ref 13.3–17.7)
HHV6 DNA # SPEC NAA+PROBE: NORMAL COPIES/ML
HHV6 DNA SPEC NAA+PROBE-LOG#: NORMAL LOG COPIES/ML
HTLV I+II AB PATRN SER IB-IMP: NEGATIVE
LYMPHOCYTES # BLD AUTO: 0.2 10E9/L (ref 0.8–5.3)
LYMPHOCYTES NFR BLD AUTO: 8.3 %
MCH RBC QN AUTO: 31.3 PG (ref 26.5–33)
MCHC RBC AUTO-ENTMCNC: 32.8 G/DL (ref 31.5–36.5)
MCV RBC AUTO: 95 FL (ref 78–100)
MONOCYTES # BLD AUTO: 0.1 10E9/L (ref 0–1.3)
MONOCYTES NFR BLD AUTO: 2.8 %
NEUTROPHILS # BLD AUTO: 2.3 10E9/L (ref 1.6–8.3)
NEUTROPHILS NFR BLD AUTO: 78.7 %
NUM BPU REQUESTED: 1
PLATELET # BLD AUTO: 13 10E9/L (ref 150–450)
PLATELET # BLD EST: ABNORMAL 10*3/UL
POTASSIUM SERPL-SCNC: 3.4 MMOL/L (ref 3.4–5.3)
RBC # BLD AUTO: 2.84 10E12/L (ref 4.4–5.9)
RESULT: NORMAL
SEND OUTS MISC TEST CODE: NORMAL
SEND OUTS MISC TEST SPECIMEN: NORMAL
SODIUM SERPL-SCNC: 135 MMOL/L (ref 133–144)
SPECIMEN SOURCE: NORMAL
TEST NAME: NORMAL
TRANSFUSION STATUS PATIENT QL: NORMAL
TRANSFUSION STATUS PATIENT QL: NORMAL
WBC # BLD AUTO: 2.9 10E9/L (ref 4–11)

## 2019-11-13 PROCEDURE — 25000132 ZZH RX MED GY IP 250 OP 250 PS 637: Performed by: INTERNAL MEDICINE

## 2019-11-13 PROCEDURE — 82787 IGG 1 2 3 OR 4 EACH: CPT | Performed by: INTERNAL MEDICINE

## 2019-11-13 PROCEDURE — 25500064 ZZH RX 255 OP 636: Performed by: STUDENT IN AN ORGANIZED HEALTH CARE EDUCATION/TRAINING PROGRAM

## 2019-11-13 PROCEDURE — 87799 DETECT AGENT NOS DNA QUANT: CPT | Performed by: INTERNAL MEDICINE

## 2019-11-13 PROCEDURE — 25000125 ZZHC RX 250: Performed by: PHYSICIAN ASSISTANT

## 2019-11-13 PROCEDURE — 20600000 ZZH R&B BMT

## 2019-11-13 PROCEDURE — 80048 BASIC METABOLIC PNL TOTAL CA: CPT | Performed by: INTERNAL MEDICINE

## 2019-11-13 PROCEDURE — 86622 BRUCELLA ANTIBODY: CPT | Performed by: INTERNAL MEDICINE

## 2019-11-13 PROCEDURE — 82784 ASSAY IGA/IGD/IGG/IGM EACH: CPT | Performed by: INTERNAL MEDICINE

## 2019-11-13 PROCEDURE — A9585 GADOBUTROL INJECTION: HCPCS | Performed by: STUDENT IN AN ORGANIZED HEALTH CARE EDUCATION/TRAINING PROGRAM

## 2019-11-13 PROCEDURE — 87798 DETECT AGENT NOS DNA AMP: CPT | Performed by: INTERNAL MEDICINE

## 2019-11-13 PROCEDURE — P9037 PLATE PHERES LEUKOREDU IRRAD: HCPCS | Performed by: INTERNAL MEDICINE

## 2019-11-13 PROCEDURE — 74183 MRI ABD W/O CNTR FLWD CNTR: CPT

## 2019-11-13 PROCEDURE — 71046 X-RAY EXAM CHEST 2 VIEWS: CPT

## 2019-11-13 PROCEDURE — 86738 MYCOPLASMA ANTIBODY: CPT | Performed by: INTERNAL MEDICINE

## 2019-11-13 PROCEDURE — 25000128 H RX IP 250 OP 636: Performed by: PHYSICIAN ASSISTANT

## 2019-11-13 PROCEDURE — 87633 RESP VIRUS 12-25 TARGETS: CPT | Performed by: INTERNAL MEDICINE

## 2019-11-13 PROCEDURE — 25000132 ZZH RX MED GY IP 250 OP 250 PS 637: Performed by: PHYSICIAN ASSISTANT

## 2019-11-13 PROCEDURE — 36415 COLL VENOUS BLD VENIPUNCTURE: CPT | Performed by: INTERNAL MEDICINE

## 2019-11-13 PROCEDURE — 87040 BLOOD CULTURE FOR BACTERIA: CPT | Performed by: INTERNAL MEDICINE

## 2019-11-13 PROCEDURE — 84999 UNLISTED CHEMISTRY PROCEDURE: CPT | Performed by: INTERNAL MEDICINE

## 2019-11-13 PROCEDURE — 86638 Q FEVER ANTIBODY: CPT | Performed by: INTERNAL MEDICINE

## 2019-11-13 PROCEDURE — 85025 COMPLETE CBC W/AUTO DIFF WBC: CPT | Performed by: INTERNAL MEDICINE

## 2019-11-13 PROCEDURE — 86611 BARTONELLA ANTIBODY: CPT | Performed by: INTERNAL MEDICINE

## 2019-11-13 PROCEDURE — 87103 BLOOD FUNGUS CULTURE: CPT | Performed by: INTERNAL MEDICINE

## 2019-11-13 PROCEDURE — 86631 CHLAMYDIA ANTIBODY: CPT | Performed by: INTERNAL MEDICINE

## 2019-11-13 RX ORDER — FUROSEMIDE 10 MG/ML
40 INJECTION INTRAMUSCULAR; INTRAVENOUS ONCE
Status: COMPLETED | OUTPATIENT
Start: 2019-11-13 | End: 2019-11-13

## 2019-11-13 RX ORDER — GADOBUTROL 604.72 MG/ML
0.1 INJECTION INTRAVENOUS ONCE
Status: COMPLETED | OUTPATIENT
Start: 2019-11-13 | End: 2019-11-13

## 2019-11-13 RX ORDER — POTASSIUM CHLORIDE 750 MG/1
40 TABLET, EXTENDED RELEASE ORAL ONCE
Status: COMPLETED | OUTPATIENT
Start: 2019-11-13 | End: 2019-11-13

## 2019-11-13 RX ADMIN — CELECOXIB 100 MG: 100 CAPSULE ORAL at 19:45

## 2019-11-13 RX ADMIN — CELECOXIB 100 MG: 100 CAPSULE ORAL at 08:18

## 2019-11-13 RX ADMIN — ACETAMINOPHEN 650 MG: 325 TABLET, FILM COATED ORAL at 04:27

## 2019-11-13 RX ADMIN — ACYCLOVIR 800 MG: 800 TABLET ORAL at 19:44

## 2019-11-13 RX ADMIN — ITRACONAZOLE 200 MG: 100 CAPSULE ORAL at 18:23

## 2019-11-13 RX ADMIN — ELTROMBOPAG OLAMINE 50 MG: 50 TABLET, FILM COATED ORAL at 08:17

## 2019-11-13 RX ADMIN — ACETAMINOPHEN 650 MG: 325 TABLET, FILM COATED ORAL at 19:45

## 2019-11-13 RX ADMIN — ACYCLOVIR 800 MG: 800 TABLET ORAL at 08:18

## 2019-11-13 RX ADMIN — POTASSIUM CHLORIDE 40 MEQ: 750 TABLET, EXTENDED RELEASE ORAL at 12:20

## 2019-11-13 RX ADMIN — PAROXETINE HYDROCHLORIDE HEMIHYDRATE 20 MG: 20 TABLET, FILM COATED ORAL at 08:17

## 2019-11-13 RX ADMIN — ITRACONAZOLE 200 MG: 100 CAPSULE ORAL at 08:17

## 2019-11-13 RX ADMIN — GADOBUTROL 8 ML: 604.72 INJECTION INTRAVENOUS at 14:38

## 2019-11-13 RX ADMIN — FUROSEMIDE 40 MG: 20 INJECTION, SOLUTION INTRAMUSCULAR; INTRAVENOUS at 10:24

## 2019-11-13 RX ADMIN — PHYTONADIONE 10 MG: 10 INJECTION, EMULSION INTRAMUSCULAR; INTRAVENOUS; SUBCUTANEOUS at 08:18

## 2019-11-13 ASSESSMENT — ACTIVITIES OF DAILY LIVING (ADL)
ADLS_ACUITY_SCORE: 11

## 2019-11-13 NOTE — PROGRESS NOTES
RHEUMATOLOGY PROGRESS NOTE - Resident     Angel Yanez MRN# [id number]   Age: 59 year old YOB: 1960     Date of Admission:  11/9/2019    Assessment and Plan:   Summary: Angel Yanez is a 60 yo man D+178 s/p 2nd autologous PBSCT (cytoxan/melphalan) transplant for MM. S/p 2 recent readmissions for FUO (10/16-10/23; 9/23-9/29/19). Extensive infectious and cancer w/u has been done so far and all negative. Rheumatology was consulted for FUO.     Lymph node biopsy did not show evidence of HLH. Bone marrow biopsy 11/6 showed no morphologic or immunophenotypic evidence of plasma cell neoplasm. CRP in the 200's and ESR in the 100's makes infection more likely than autoimmune diseases.      Pt had history of psoriatic arthritis and was on methotrexate in the past. However, no evidence by physical exam or history of present inflammatory illness to support possible autoimmune disease flare with fever. Moreover, BINU, RF, anti CCP, ds DNA all negative. ANCA pending. CT chest showed fibrotic change. Not progressive compared to previous CT. Sruthi-1 negative. Awaiting soluble IL-2RA. Finally, we found him having difficulty with finding words. work up: R axillary node biopsy, HTLV pending.     Interval discussion: Fever was up to 103 overnight. ANCA negative. Sruthi-1 negative.  Cefepime was discontinued on 11/12.     Problem list:  FUO  MM s/p autologous PBSCT  anemia, thrombocytopenia post BMT  intermittent neutropenia  Hx of psoriatic arthritis   CT finding of fibrotic change in lungs, a 1.6 x 1.5 x 1.6 cm hypoenhancing ill-defined focus in the  superior pole of the right kidney     Recommendations:  -- Follow soluble IL-2RA  -- Consider possible CNS infection work up, however, will defer to ID  -- Agree with renal MRI     The patient was discussed with Dr. Tam.      Yane Gonzalez MD  Internal Medicine PGY-3  [phone]    I discussed this patient with the medical resident. I agree with the stated findings and  recommendations which reflect our joint impressions and plan.    Jerardo Tam M.D.  Staff Rheumatologist,  Health  Pager [phone]      Subjective/24 hour events:   No new change. Had fever 103 overnight. Reported his breathing is slightly better today.            Medications:     Current Facility-Administered Medications   Medication     acetaminophen (TYLENOL) tablet 325-650 mg     acetaminophen (TYLENOL) tablet 325-650 mg     acyclovir (ZOVIRAX) tablet 800 mg     albuterol (PROVENTIL) neb solution 2.5 mg     celecoxib (celeBREX) capsule 100 mg     eltrombopag (PROMACTA) tablet 50 mg     itraconazole (SPORANOX) capsule 200 mg     magnesium sulfate 4 g in 100 mL sterile water (premade)     meperidine (DEMEROL) injection 12.5 mg     naloxone (NARCAN) injection 0.1-0.4 mg     PARoxetine (PAXIL) tablet 20 mg     polyethylene glycol (MIRALAX/GLYCOLAX) Packet 17 g     potassium chloride (KLOR-CON) Packet 20-40 mEq     potassium chloride 10 mEq in 100 mL intermittent infusion with 10 mg lidocaine     potassium chloride 10 mEq in 100 mL sterile water intermittent infusion (premix)     potassium chloride 20 mEq in 50 mL intermittent infusion     potassium chloride ER (K-DUR/KLOR-CON M) CR tablet 20-40 mEq     potassium phosphate 15 mmol in D5W 250 mL intermittent infusion     potassium phosphate 20 mmol in D5W 250 mL intermittent infusion     potassium phosphate 20 mmol in D5W 500 mL intermittent infusion     potassium phosphate 25 mmol in D5W 500 mL intermittent infusion     prochlorperazine (COMPAZINE) injection 5-10 mg    Or     prochlorperazine (COMPAZINE) tablet 5-10 mg     sodium chloride 0.9% infusion            Review of Systems:   Complete 8 point ROS completed and negative unless mentioned in subjective.          Physical Exam:   /79 (BP Location: Left arm)   Pulse 77   Temp 98.7  F (37.1  C) (Axillary)   Resp 18   Wt 81.1 kg (178 lb 14.4 oz)   SpO2 92%   BMI 25.09 kg/m       Constitutional: lying in bed, NAD  HEENT: MMM, EOM intact, sclerae clear and anicteric  Heart: RRR, no murmurs appreciated  Resp: rales at base bilaterally+  Abd: Soft, non-tender, BS+  Extremities: No peripheral edema  Skin: no rash   MSK: MMT all 5/5, wrist flexion 50 degree, wrist extension 75 degree, other joints ROM full, no joints effusion or sign of synovitis.  Neuro: No focal deficits        Data:   Labs and imaging reviewed.     Yane Gonzalez MD  Internal Medicine PGY-3  [phone]

## 2019-11-13 NOTE — PROGRESS NOTES
BMT Progress Note    ID: Angel Yanez is a 58 yo man D+180 s/p 2nd autologous transplant for MM. S/p 2 recent readmissions for FUO (10/16-10/23; 9/23-9/29/19).     HPI: Continues with dyspnea on exertion. Didn't think lasix changed much yesterday. Some dry cough occasional sputum. No new issues.     ROS: Negative except as stated above    Physical Exam  /75 (BP Location: Left arm)   Pulse 77   Temp 98.8  F (37.1  C) (Axillary)   Resp 18   Wt 81.1 kg (178 lb 14.4 oz)   SpO2 93%   BMI 25.09 kg/m       Constitutional: eating breakfast, some SOB noted with speaking.   HEENT: MMM, EOM intact, sclerae clear and anicteric  Heart: Tachycardic but RR, no murmurs appreciated  Resp: On RA +bibasilar lung field crackles improved today.   Abd: Soft, non-tender, BS+  Lymph: No peripheral edema  Skin: no rash   Neuro: No focal deficits    Labs  Lab Results   Component Value Date    WBC 2.9 (L) 11/13/2019    ANEU 2.3 11/13/2019    HGB 8.9 (L) 11/13/2019    HCT 27.1 (L) 11/13/2019    PLT 13 (LL) 11/13/2019     11/13/2019    POTASSIUM 3.4 11/13/2019    CHLORIDE 102 11/13/2019    CO2 24 11/13/2019    GLC 91 11/13/2019    BUN 17 11/13/2019    CR 0.80 11/13/2019    MAG 2.2 11/06/2019    INR 1.27 (H) 11/12/2019    BILITOTAL 0.8 11/10/2019    AST 10 11/10/2019    ALT 17 11/10/2019    ALKPHOS 101 11/10/2019    PROTTOTAL 6.0 (L) 11/10/2019    ALBUMIN 2.6 (L) 11/10/2019     A/P:  Angel Yanez is a 58 yo man D+180 s/p 2nd autologous transplant for MM. S/p 2 recent readmissions for FUO (10/16-10/23; 9/23-9/29/19).      1.  BMT/MM:   Slow engraftment; cell dose was only 0.639x10^6. (M[facility] [place] - known poor cell dose as failed chemo-mobilization 1/2019.)   - Stringent CR.  - day +180 bone marrow biopsy 11/6 which showed no morphologic or immunophenotypic evidence of plasma cell neoplasm.   - PET in 8/2019 clear.      2.  HEME: Keep Hgb>8g/dL, plt >20 (epistaxis requiring rhinorocket packing in last admission).   -  anemic, thrombocytopenic post BMT, low cell dose, +/- infection.   - intermittent neutropenia: GCSF last on 11/6; give prn ANC </=1000.   - No evidence of HLH on lymphnode bx 10/18/19.     - Persistent thrombocytopenia: BM 11/6/19 showed adequate granulopoiesis and erythropoiesis but his platelet precursors were 0 to absent which is reflected in peripheral CBC. Started promactic 50mcg daily on 11/10  - Hemolysis eval neg.   - prolonged INR. Improved with Vit K. Repeat tomorrow.       3.  ID: FUO: tmax 102.8 with scheduled celebrex.  A.) Extensive ID work up unremarkable x 2 sept and oct. 10/16 and 11/9 repeat chest CT: persistent mediastinal adenopathy no paranchymal disease. S/P bx of L axillary node on 10/18: No evidence of HLH, tissues looks reactive.   - ID consulted 11/10, appreciate recs. Extensive ID testing negative but also lots still pending. Following cx results daily. Consulted general surger for excisional lymph node bx (neg core bx 10/18 L axillary). Would like R axillary node (please send for pathology, leukemia/lymphoma, Gram stain, aerobic cx, anaerobic cx, fungal cx, AFB smear and cx, and send out to [facility] for next generation bacterial, mycobacterial, fungal PCR.   - HTLV pending with very low lymphocyte count. CD4 count 146.   - Per ID with negative w/u and not neutropenic discontinued cefepime 11/12.  - Per ID, possible histoplasmosis: empiric itraconazole 200 TID started last admission with fevers initially resolved but now recurred; currently on bid dosing. Pt declining recommened ID follow-up in December. 10/18 Histo/blasto urine antigen negative. Per Dr Kelley flucaonzole can suppress growth? 10/18 Kairus DNA test- negative. itra level pending.    - prophy ACV, Pentamidine (last 10/17/19)    B.) Autoimmune workup so far negative. Rheum consult 11/11 feels autoimmune disease less likely with neg studies above and CRP and ESR elevated. Rec possible CNS w/u for  infection--nothing further today.    C.) Malignancy W/U: Neg BM BX 11/6. Neg PET in 8/2019. Neg core lymph node needle bx 10/18. PSA neg. Admit CT with R kidney hypoenhancing ill-defined focus. Will pursue MRI to better evaluate.       4. Pulm:   - tachypnea/SOB: mainly with fevers. On RA. Imaging w/ contrast neg. Crackles and fluid up. Will give lasix 40mg today and repeat CXR.  - 10/17: Echo with preserved EF, no evidence of right heart strain.  - 10/17 VQ scan neg for PE.     5. GI:    - LFTs are wnl     5.  FEN/Renal:   - Lyte replacement per scheduled protocol.  Will give 40meq KCL x 1 today as giving lasix.      6.  Mood: Continue Paxil.    Cassie Ferrari PA-C  #[id number]    The patient was seen and examined by me separately from the midlevel provider. The note reflects our mutual assessment and plans and were approved by me personally.   I personally reviewed today's lab results vital signs and radiology results.    Each point of the assessment and plan were reviewed by the midlevel and me and either endorsed by me or were my added decisions.    My pertinent physical findings today are: Febrile, clear lungs and no rash.    My assessment and plan are: 58 yo 180 day post second auto PBSC for myeloma admitted for w/u FUO. CT CAP -. On itraconozole for possible  Histoplasmosis. Do connective tissue disease autoimmune w/u (R/O rheumatoid arthritis, SLE ans also Lyme disease.) Consult ID. Schedule celebrex for comfort. Consult rheumatology. Increase celebres to 100 mg po BID. Check PSA. Appreciate  ID and rheum recommendation for extensive w/u: will implement. Consider repeat lymph node biopsy. MRI kidney to evaluate small mass. CXR, check RVP.    Stephan Godwin M.D.

## 2019-11-13 NOTE — PLAN OF CARE
2823-4922: /66 (BP Location: Left arm)   Pulse 83   Temp 99.4  F (37.4  C) (Axillary)   Resp 18   Wt 81.4 kg (179 lb 7.3 oz)   SpO2 94%   BMI 25.17 kg/m    Patient febrile this morning, temp 102.8 F, other VSS. Tylenol given. Blood cultures drawn by lab. Non-productive cough. Denies pain or other complaints. Platelets currently being transfused. Continue to monitor and plan of care.    Problem: Adult Inpatient Plan of Care  Goal: Plan of Care Review  11/13/2019 0613 by Renee Parr, RN  Outcome: No Change     Problem: Fever (Fever with Neutropenia)  Goal: Baseline Body Temperature  11/13/2019 0613 by Renee Parr, RN  Outcome: No Change     Problem: Infection Risk (Fever with Neutropenia)  Goal: Absence of Infection  11/13/2019 0613 by Renee Parr, RN  Outcome: No Change

## 2019-11-13 NOTE — CONSULTS
[facility] General Surgery Consultation    Angel Yanez MRN# [id number]   Age: 59 year old YOB: 1960     Date of Admission:  11/9/2019    Date of Consult:   11/13/2019    Reason for consult: Lymph node biopsy       Requesting service: Medicine; requesting provider: Dr. Ferrari                    Assessment and Plan:   Assessment:   Mr. Yanez is a 58 yo M with a h/o MM s/p 2 autologous bone marrow transplants now with fevers of unknown origin that have required 3 hospitalizations without an etiology        Plan:   - Plan for RIGHT axillary node biopsy on 11/14 with Dr. Irving  - NPO at midnight  - Please replace platelets to > 30    Discussed with staff, Dr. Irving            Chief Complaint:   Excision biopsy         History of Present Illness:   Mr. Yanez is a 58 yo M w/ a h/o multiple myeloma s/p 2 autologous bone marrow transplants here with fevers of unknown origin. He has been hospitalized twice this year (Septemver and October) already for this problem. Thus far he has had an extensive infectious and rheumatologic workup without any discernable etiology for his fevers. He underwent a core needle biopsy of a left axillary node, as he has adenopathy, with path negative for malignancy or EBV. He is currently transfusion dependent to maintain adequate cell counts.    Overnight he was febrile to 102.8, he is normotensive and without tachycardia. His labs are remarkable for neutropenia (2.9), anemia (hgb 8.9), and thrombocytopenia (13). His most recent CT CAP from 11/9 demonstrates axillary adenopathy stable from previous CT.           Past Medical History:     Past Medical History:   Diagnosis Date     GERD (gastroesophageal reflux disease)      H/O autologous stem cell transplant (H) 02/2005     Hyperlipidemia      Multiple myeloma (H) 2004     PONV (postoperative nausea and vomiting)      Psoriasis      Psoriatic arthritis (H)              Past Surgical History:     Past Surgical History:   Procedure  Laterality Date     ARTHROPLASTY HIP       COLONOSCOPY       HERNIA REPAIR       IR CVC TUNNEL PLACEMENT > 5 YRS OF AGE  1/22/2019     IR CVC TUNNEL PLACEMENT > 5 YRS OF AGE  5/16/2019     IR CVC TUNNEL REMOVAL LEFT  2/20/2019     IR CVC TUNNEL REMOVAL RIGHT  7/23/2019     IR FOLLOW UP VISIT OUTPATIENT  1/24/2019     IR LYMPH NODE BIOPSY  10/18/2019     ORTHOPEDIC SURGERY       PROCURE BONE MARROW N/A 5/14/2019    Procedure: Bone Marrow [place];  Surgeon: Antwan Erickson MD;  Location: [facility] OR     TRANSPLANT               Social History:     Social History     Tobacco Use     Smoking status: Former Smoker     Smokeless tobacco: Never Used   Substance Use Topics     Alcohol use: Yes     Comment: occasionaly             Family History:     Family History   Problem Relation Age of Onset     Diabetes Mother      Cerebrovascular Disease Mother      Leukemia Father                 Allergies:     Allergies   Allergen Reactions     Avalide Hives     Chlorhexidine Itching     Chloroxylenol Rash     Technicare solution     Lorazepam Hives     Moxifloxacin Hives             Medications:     Current Facility-Administered Medications   Medication     acetaminophen (TYLENOL) tablet 325-650 mg     acetaminophen (TYLENOL) tablet 325-650 mg     acyclovir (ZOVIRAX) tablet 800 mg     albuterol (PROVENTIL) neb solution 2.5 mg     [START ON 11/15/2019] ceFAZolin (ANCEF) intermittent infusion 2 g in 100 mL dextrose PRE-MIX     celecoxib (celeBREX) capsule 100 mg     eltrombopag (PROMACTA) tablet 50 mg     furosemide (LASIX) injection 40 mg     itraconazole (SPORANOX) capsule 200 mg     magnesium sulfate 4 g in 100 mL sterile water (premade)     meperidine (DEMEROL) injection 12.5 mg     naloxone (NARCAN) injection 0.1-0.4 mg     PARoxetine (PAXIL) tablet 20 mg     polyethylene glycol (MIRALAX/GLYCOLAX) Packet 17 g     potassium chloride (KLOR-CON) Packet 20-40 mEq     potassium chloride 10 mEq in 100 mL intermittent infusion  with 10 mg lidocaine     potassium chloride 10 mEq in 100 mL sterile water intermittent infusion (premix)     potassium chloride 20 mEq in 50 mL intermittent infusion     potassium chloride ER (K-DUR/KLOR-CON M) CR tablet 20-40 mEq     potassium chloride ER (K-DUR/KLOR-CON M) CR tablet 40 mEq     potassium phosphate 15 mmol in D5W 250 mL intermittent infusion     potassium phosphate 20 mmol in D5W 250 mL intermittent infusion     potassium phosphate 20 mmol in D5W 500 mL intermittent infusion     potassium phosphate 25 mmol in D5W 500 mL intermittent infusion     prochlorperazine (COMPAZINE) injection 5-10 mg    Or     prochlorperazine (COMPAZINE) tablet 5-10 mg     sodium chloride 0.9% infusion               Review of Systems:   10 point ROS negative aside from symptoms in HPI          Physical Exam:   All vitals have been reviewed  Temp:  [97.4  F (36.3  C)-102.7  F (39.3  C)] 98.9  F (37.2  C)  Pulse:  [69-76] 76  Heart Rate:  [71-97] 78  Resp:  [16-18] 18  BP: (102-152)/(65-89) 125/76  SpO2:  [92 %-98 %] 94 %    Intake/Output Summary (Last 24 hours) at 11/13/2019 0917  Last data filed at 11/13/2019 0800  Gross per 24 hour   Intake 1596 ml   Output 1875 ml   Net -279 ml     Physical Exam:  NAD,sitting comfortably in his chair  RRR  Non-labored breathing on RA  No palpable right or left axially lymphadenopathy          Data:   All laboratory data reviewed    Results:  BMP  Recent Labs   Lab 11/14/19  0404 11/13/19 0440 11/12/19  1738 11/12/19 0318 11/11/19  0854    135  --  136  --  136   POTASSIUM 3.5 3.4 3.5 3.7   < > 2.9*   CHLORIDE 103 102  --  105  --  106   CO2 26 24  --  24  --  22   BUN 21 17  --  15  --  22   CR 0.81 0.80  --  0.80  --  0.74   GLC 91 91  --  96  --  145*    < > = values in this interval not displayed.     CBC  Recent Labs   Lab 11/14/19  0404 11/13/19  0440 11/12/19 0318 11/11/19  1744   WBC 2.4* 2.9* 2.7* 2.5*   HGB 8.0* 8.9* 9.0* 8.7*   PLT 15* 13* 29* 19*     LFT  Recent Labs    Lab 11/14/19  0404 11/12/19  0318 11/10/19  0413 11/09/19  1800   AST  --   --  10  --    ALT  --   --  17  --    ALKPHOS  --   --  101  --    BILITOTAL  --   --  0.8  --    ALBUMIN  --   --  2.6*  --    INR 1.24* 1.27*  --  1.40*     Recent Labs   Lab 11/14/19  0404 11/13/19  0440 11/12/19 0318 11/11/19  0854 11/10/19  0413 11/09/19  1112   GLC 91 91 96 145* 144* 133*       Imaging:  CT CAP 11/9:   IMPRESSION: In this patient with history of multiple myeloma status  post bone marrow transplant:  1. No specific findings to explain patient's fever of unknown origin.   2. There is a 1.6 cm hypoenhancing ill-defined focus in the superior  pole the right kidney. This is thought to represent sequela of prior  inflammation. Occult malignancy is thought less likely but not  entirely excluded. Recommend follow-up MRI in outpatient setting.  3. The spine is severely demineralized with multilevel compression  fractures which are stable.  4. Stable bilateral axillary, mediastinal, and superficial inguinal  lymphadenopathy.  5. Stable small peripheral ill-defined hypodensities within the  spleen, nonspecific and may be infarctions.      Eloy Sullivan MD  General Surgery, PGY2

## 2019-11-14 ENCOUNTER — APPOINTMENT (OUTPATIENT)
Dept: CARDIOLOGY | Facility: CLINIC | Age: 59
DRG: 853 | End: 2019-11-14
Attending: PHYSICIAN ASSISTANT
Payer: COMMERCIAL

## 2019-11-14 ENCOUNTER — ANESTHESIA EVENT (OUTPATIENT)
Dept: SURGERY | Facility: CLINIC | Age: 59
End: 2019-11-14

## 2019-11-14 ENCOUNTER — DOCUMENTATION ONLY (OUTPATIENT)
Dept: TRANSPLANT | Facility: CLINIC | Age: 59
End: 2019-11-14

## 2019-11-14 LAB
A PHAGOCYTOPH IGG TITR SER IF: NORMAL {TITER}
ABO + RH BLD: NORMAL
ABO + RH BLD: NORMAL
ANION GAP SERPL CALCULATED.3IONS-SCNC: 8 MMOL/L (ref 3–14)
ANISOCYTOSIS BLD QL SMEAR: ABNORMAL
BACTERIA SPEC CULT: NO GROWTH
BASOPHILS # BLD AUTO: 0 10E9/L (ref 0–0.2)
BASOPHILS NFR BLD AUTO: 0 %
BLD GP AB SCN SERPL QL: NORMAL
BLD PROD TYP BPU: NORMAL
BLD UNIT ID BPU: 0
BLD UNIT ID BPU: 0
BLOOD BANK CMNT PATIENT-IMP: NORMAL
BLOOD PRODUCT CODE: NORMAL
BLOOD PRODUCT CODE: NORMAL
BPU ID: NORMAL
BPU ID: NORMAL
BUN SERPL-MCNC: 21 MG/DL (ref 7–30)
C PNEUM IGG TITR SER IF: ABNORMAL {TITER}
C PSITTACI IGG TITR SER IF: ABNORMAL {TITER}
C TRACH IGG TITR SER IF: ABNORMAL {TITER}
CALCIUM SERPL-MCNC: 8.2 MG/DL (ref 8.5–10.1)
CHLORIDE SERPL-SCNC: 103 MMOL/L (ref 94–109)
CO2 SERPL-SCNC: 26 MMOL/L (ref 20–32)
CREAT SERPL-MCNC: 0.81 MG/DL (ref 0.66–1.25)
DIFFERENTIAL METHOD BLD: ABNORMAL
EOSINOPHIL # BLD AUTO: 0.4 10E9/L (ref 0–0.7)
EOSINOPHIL NFR BLD AUTO: 15.3 %
ERYTHROCYTE [DISTWIDTH] IN BLOOD BY AUTOMATED COUNT: 24 % (ref 10–15)
FLUAV H1 2009 PAND RNA SPEC QL NAA+PROBE: NEGATIVE
FLUAV H1 RNA SPEC QL NAA+PROBE: NEGATIVE
FLUAV H3 RNA SPEC QL NAA+PROBE: NEGATIVE
FLUAV RNA SPEC QL NAA+PROBE: NEGATIVE
FLUBV RNA SPEC QL NAA+PROBE: NEGATIVE
GFR SERPL CREATININE-BSD FRML MDRD: >90 ML/MIN/{1.73_M2}
GLUCOSE SERPL-MCNC: 91 MG/DL (ref 70–99)
HADV DNA SPEC QL NAA+PROBE: NEGATIVE
HADV DNA SPEC QL NAA+PROBE: NEGATIVE
HCT VFR BLD AUTO: 24.6 % (ref 40–53)
HGB BLD-MCNC: 8 G/DL (ref 13.3–17.7)
HMPV RNA SPEC QL NAA+PROBE: NEGATIVE
HPIV1 RNA SPEC QL NAA+PROBE: NEGATIVE
HPIV2 RNA SPEC QL NAA+PROBE: NEGATIVE
HPIV3 RNA SPEC QL NAA+PROBE: NEGATIVE
INR PPP: 1.24 (ref 0.86–1.14)
LAB SCANNED RESULT: ABNORMAL
LAB SCANNED RESULT: NORMAL
LAB SCANNED RESULT: NORMAL
LYMPHOCYTES # BLD AUTO: 0.3 10E9/L (ref 0.8–5.3)
LYMPHOCYTES NFR BLD AUTO: 12.6 %
MCH RBC QN AUTO: 31 PG (ref 26.5–33)
MCHC RBC AUTO-ENTMCNC: 32.5 G/DL (ref 31.5–36.5)
MCV RBC AUTO: 95 FL (ref 78–100)
MICROBIOLOGIST REVIEW: NORMAL
MONOCYTES # BLD AUTO: 0.1 10E9/L (ref 0–1.3)
MONOCYTES NFR BLD AUTO: 2.7 %
NEUTROPHILS # BLD AUTO: 1.7 10E9/L (ref 1.6–8.3)
NEUTROPHILS NFR BLD AUTO: 69.4 %
NUM BPU REQUESTED: 1
NUM BPU REQUESTED: 1
NUM BPU REQUESTED: 2
PLATELET # BLD AUTO: 15 10E9/L (ref 150–450)
PLATELET # BLD EST: ABNORMAL 10*3/UL
POTASSIUM SERPL-SCNC: 3.5 MMOL/L (ref 3.4–5.3)
R RICKETTSI IGG TITR SER IF: NORMAL {TITER}
R RICKETTSI IGM TITR SER IF: NORMAL {TITER}
RBC # BLD AUTO: 2.58 10E12/L (ref 4.4–5.9)
RESULT: NORMAL
RHINOVIRUS RNA SPEC QL NAA+PROBE: NEGATIVE
RSV RNA SPEC QL NAA+PROBE: NEGATIVE
RSV RNA SPEC QL NAA+PROBE: NEGATIVE
SEND OUTS MISC TEST CODE: NORMAL
SEND OUTS MISC TEST SPECIMEN: NORMAL
SODIUM SERPL-SCNC: 137 MMOL/L (ref 133–144)
SPECIMEN EXP DATE BLD: NORMAL
SPECIMEN SOURCE: NORMAL
SPECIMEN SOURCE: NORMAL
TEST NAME: NORMAL
TRANSFUSION STATUS PATIENT QL: NORMAL
WBC # BLD AUTO: 2.4 10E9/L (ref 4–11)

## 2019-11-14 PROCEDURE — 25000132 ZZH RX MED GY IP 250 OP 250 PS 637: Performed by: PHYSICIAN ASSISTANT

## 2019-11-14 PROCEDURE — 93306 TTE W/DOPPLER COMPLETE: CPT

## 2019-11-14 PROCEDURE — 80048 BASIC METABOLIC PNL TOTAL CA: CPT | Performed by: INTERNAL MEDICINE

## 2019-11-14 PROCEDURE — 85025 COMPLETE CBC W/AUTO DIFF WBC: CPT | Performed by: INTERNAL MEDICINE

## 2019-11-14 PROCEDURE — 36415 COLL VENOUS BLD VENIPUNCTURE: CPT | Performed by: INTERNAL MEDICINE

## 2019-11-14 PROCEDURE — 85610 PROTHROMBIN TIME: CPT | Performed by: INTERNAL MEDICINE

## 2019-11-14 PROCEDURE — 25000132 ZZH RX MED GY IP 250 OP 250 PS 637: Performed by: INTERNAL MEDICINE

## 2019-11-14 PROCEDURE — 25000128 H RX IP 250 OP 636: Performed by: PHYSICIAN ASSISTANT

## 2019-11-14 PROCEDURE — 93306 TTE W/DOPPLER COMPLETE: CPT | Mod: 26 | Performed by: INTERNAL MEDICINE

## 2019-11-14 PROCEDURE — P9040 RBC LEUKOREDUCED IRRADIATED: HCPCS | Performed by: INTERNAL MEDICINE

## 2019-11-14 PROCEDURE — 20600000 ZZH R&B BMT

## 2019-11-14 PROCEDURE — P9037 PLATE PHERES LEUKOREDU IRRAD: HCPCS | Performed by: INTERNAL MEDICINE

## 2019-11-14 RX ORDER — SODIUM CHLORIDE 9 MG/ML
INJECTION, SOLUTION INTRAVENOUS CONTINUOUS
Status: DISCONTINUED | OUTPATIENT
Start: 2019-11-15 | End: 2019-11-14

## 2019-11-14 RX ORDER — POTASSIUM CHLORIDE 750 MG/1
40 TABLET, EXTENDED RELEASE ORAL ONCE
Status: COMPLETED | OUTPATIENT
Start: 2019-11-14 | End: 2019-11-14

## 2019-11-14 RX ORDER — CEFAZOLIN SODIUM 2 G/100ML
2 INJECTION, SOLUTION INTRAVENOUS ONCE
Status: COMPLETED | OUTPATIENT
Start: 2019-11-15 | End: 2019-11-15

## 2019-11-14 RX ORDER — FUROSEMIDE 10 MG/ML
40 INJECTION INTRAMUSCULAR; INTRAVENOUS ONCE
Status: COMPLETED | OUTPATIENT
Start: 2019-11-14 | End: 2019-11-14

## 2019-11-14 RX ORDER — PENTAMIDINE ISETHIONATE 300 MG/300MG
300 INHALANT RESPIRATORY (INHALATION)
Status: CANCELLED
Start: 2019-11-15

## 2019-11-14 RX ORDER — PENTAMIDINE ISETHIONATE 300 MG/300MG
300 INHALANT RESPIRATORY (INHALATION)
COMMUNITY
Start: 2019-11-14 | End: 2019-11-17

## 2019-11-14 RX ORDER — ACETAMINOPHEN 500 MG
1000 TABLET ORAL EVERY 6 HOURS PRN
COMMUNITY
Start: 2019-11-14 | End: 2019-12-03

## 2019-11-14 RX ORDER — CELECOXIB 100 MG/1
100 CAPSULE ORAL 2 TIMES DAILY
Qty: 60 CAPSULE | Refills: 0 | Status: SHIPPED | OUTPATIENT
Start: 2019-11-14 | End: 2019-12-09

## 2019-11-14 RX ORDER — ALBUTEROL SULFATE 5 MG/ML
2.5 SOLUTION RESPIRATORY (INHALATION)
Status: CANCELLED
Start: 2019-11-18

## 2019-11-14 RX ADMIN — ACYCLOVIR 800 MG: 800 TABLET ORAL at 19:32

## 2019-11-14 RX ADMIN — CELECOXIB 100 MG: 100 CAPSULE ORAL at 19:32

## 2019-11-14 RX ADMIN — ACETAMINOPHEN 650 MG: 325 TABLET, FILM COATED ORAL at 17:54

## 2019-11-14 RX ADMIN — POTASSIUM CHLORIDE 40 MEQ: 750 TABLET, EXTENDED RELEASE ORAL at 10:26

## 2019-11-14 RX ADMIN — FUROSEMIDE 40 MG: 10 INJECTION, SOLUTION INTRAVENOUS at 10:26

## 2019-11-14 RX ADMIN — ITRACONAZOLE 200 MG: 100 CAPSULE ORAL at 07:56

## 2019-11-14 RX ADMIN — ACYCLOVIR 800 MG: 800 TABLET ORAL at 07:56

## 2019-11-14 RX ADMIN — CELECOXIB 100 MG: 100 CAPSULE ORAL at 07:56

## 2019-11-14 RX ADMIN — ITRACONAZOLE 200 MG: 100 CAPSULE ORAL at 17:51

## 2019-11-14 RX ADMIN — ELTROMBOPAG OLAMINE 50 MG: 50 TABLET, FILM COATED ORAL at 13:55

## 2019-11-14 RX ADMIN — ELTROMBOPAG OLAMINE 50 MG: 50 TABLET, FILM COATED ORAL at 07:56

## 2019-11-14 RX ADMIN — PAROXETINE HYDROCHLORIDE HEMIHYDRATE 20 MG: 20 TABLET, FILM COATED ORAL at 07:56

## 2019-11-14 ASSESSMENT — ACTIVITIES OF DAILY LIVING (ADL)
ADLS_ACUITY_SCORE: 11

## 2019-11-14 NOTE — SUMMARY OF CARE
Guille Yanez   Home Medication Instructions VAIBHAV:[id number]    Printed on:11/17/19 3721   Medication Information                      acetaminophen (TYLENOL) 500 MG tablet  Take 2 tablets (1,000 mg) by mouth every 6 hours as needed for fever             acyclovir (ZOVIRAX) 800 MG tablet  Take 1 tablet (800 mg) by mouth 2 times daily             azithromycin (ZITHROMAX) 250 MG tablet  Take 1 tablet (250 mg) by mouth daily for 4 days. Start on 11/18/19              calcium carbonate (CALCIUM CARBONATE) 600 MG tablet  Take 2 tablets by mouth daily              celecoxib (CELEBREX) 100 MG capsule  Take 1 capsule (100 mg) by mouth 2 times daily. Will cut back/stop in clinic if fevers remain controlled.              Cholecalciferol (VITAMIN D3) 2000 units CAPS  Take 2,000 Units by mouth daily              itraconazole (SPORANOX) 100 MG capsule  Take 2 capsules (200 mg) by mouth 2 times daily             PARoxetine (PAXIL) 20 MG tablet  Take 20 mg by mouth every morning             pentamidine (NEBUPENT) 300 MG neb solution  Inhale 300 mg into the lungs every 28 days Will get in BMT clinic. Next due Tuesday 11/19/19             triamcinolone (KENALOG) 0.1 % external cream  Apply topically 2 times daily to rash on back

## 2019-11-14 NOTE — DISCHARGE SUMMARY
[facility] Discharge Summary   Angel Yanez MRN# [id number]   Age: 59 year old  YOB: 1960   Date of Admission: 11/9/2019  Date of Discharge:  11/17/2019  Admitting Physician: Stephan Godwin MD  Discharge Physician:  Stephan Godwin MD  Discharge Diagnoses:    1.) Multiple Myeloma Day +184 s/p Auto PBSCT  2.) Fever of Unknown Origin  3.) Pancytopenia secondary to low stem cell dose  Discharge Medications:       Guille Yanez   Home Medication Instructions [id number]:[id number]    Printed on:11/14/19 1219   Medication Information                      acetaminophen (TYLENOL) 500 MG tablet  Take 2 tablets (1,000 mg) by mouth every 6 hours as needed for fever             acyclovir (ZOVIRAX) 800 MG tablet  Take 1 tablet (800 mg) by mouth 2 times daily             calcium carbonate (CALCIUM CARBONATE) 600 MG tablet  Take 2 tablets by mouth daily              celecoxib (CELEBREX) 100 MG capsule  Take 1 capsule (100 mg) by mouth 2 times daily             Cholecalciferol (VITAMIN D3) 2000 units CAPS  Take 2,000 Units by mouth daily              itraconazole (SPORANOX) 100 MG capsule  Take 2 capsules (200 mg) by mouth 2 times daily             PARoxetine (PAXIL) 20 MG tablet  Take 20 mg by mouth every morning             pentamidine (NEBUPENT) 300 MG neb solution  Inhale 300 mg into the lungs every 28 days Will get in BMT clinic. Next due Monday 11/19/19             Azithromycin 250mg PO daily x 4 days.              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 Now being admitted with FUO. This is his 3rd admission for FUO. No localizing signs/symptoms.     Hospital Course:    Angel Yanez is a 60 yo man D+184 s/p 2nd autologous transplant for MM. S/p 2 recent readmissions for FUO (10/16-10/23; 9/23-9/29/19).      1.  BMT/MM:   Slow engraftment; cell dose was only  0.639x10^6. (Marrow Blossom - known poor cell dose as failed chemo-mobilization 1/2019.)   - Stringent CR.  - day +180 bone marrow biopsy 11/6 which showed no morphologic or immunophenotypic evidence of plasma cell neoplasm.   - PET in 8/2019 clear.      2.  HEME: Keep Hgb>8g/dL, plt >20 (epistaxis requiring rhinorocket packing in last admission).   - anemic, thrombocytopenic post BMT, low cell dose, +/- infection.   - intermittent neutropenia: GCSF last on 11/16; give prn ANC </=1000.   - No evidence of HLH on lymphnode bx 10/18/19. New bx pending high soluble IL-2 RA.  - Persistent thrombocytopenia: BM 11/6/19 showed adequate granulopoiesis and erythropoiesis but his platelet precursors were 0 to absent which is reflected in peripheral CBC. Started promactic 50mcg daily on 11/10. Increased to 100 mg on 11/14 and pending results go to max dose in a few days. 11/15 Holding promacta, 5% chance of causing rash. PA is approved. See incomplete note from Alejandra Jenkins 11/14. Needs to be filled at the speciality pharmacy. continue to hold until skin bx back.   - Hemolysis eval neg.   - prolonged INR. Improved with Vit K.      3.  ID: FUO: Afebrile x 72 hrs on celebrex--will need to stop/taper as an outpatient.   A.) Extensive ID work up unremarkable x 2 sept and oct. 10/16 and 11/9 repeat chest CT: persistent mediastinal adenopathy no paranchymal disease. S/P bx of L axillary node on 10/18: No evidence of HLH, tissues looks reactive.   - ID consulted 11/10, appreciate recs. Extensive ID testing negative but also lots still pending. Following cx results daily.   - Consulted general surgery for excisional lymph node bx (neg core bx 10/18 L axillary). Would like R axillary node (please send for pathology, leukemia/lymphoma, Gram stain, aerobic cx, anaerobic cx, fungal cx, AFB smear and cx, and send out to [facility] for next generation bacterial, mycobacterial, fungal PCR.   -Received prophy Ancef pre-op.  - HTLV  neg with very low lymphocyte count. CD4 count 146.   - Per ID with negative w/u and not neutropenic discontinued cefepime 11/12. 11/15 CT Chest new GGO. Resumed cefepime cx's pending. RVP neg. Pulm consulted. No indication for BAL or further treatment/testing. Will discontinue cefepime on discharge and complete a zpak.   - Per ID, possible histoplasmosis: empiric itraconazole 200 TID started last admission with fevers initially resolved but now recurred; currently on bid dosing. Pt declining recommened ID follow-up in December. 10/18 Histo/blasto urine antigen negative. Per Dr Kelley flucaonzole can suppress growth? 10/18 [place]us DNA test- negative. 1/12 itra level=1.6.    - prophy ACV, Pentamidine (last 10/17/19). Plan on giving pentamidine in clinic on 11/19/2019     B.) Autoimmune workup so far negative. Rheum consult, last note on 11/14: feels no evidence of autoimmune disease flair neg studies above but recommended evaluate possible granulomatous finding with lymph nose biopsy to r/o sarcoidosis in the setting of FUO and elevated soluble IL-2 RA--cx's and path pending. No surgery f/u needed. Site has glue.      C.) Malignancy W/U: Neg BM BX 11/6. Neg PET in 8/2019. Neg core lymph node needle bx 10/18. PSA neg. Admit CT with R kidney hypoenhancing ill-defined focus. MRI 11/13 not suggestive of urothelial or renal cell carcinoma.       4. Pulm:   - tachypnea/SOB: mainly with fevers. On RA. Imaging w/ contrast neg. Crackles and fluid up have been giving PRN lasix. CT Chest 11/15 new GGO and small effusions. Pulm consult as above. RVP neg. Cx pending. Discharging on zpak.   - 10/17: Echo with preserved EF, no evidence of right heart strain. Repeat echo 11/14 stable.  - 10/17 VQ scan neg for PE.     5. GI:    - 11/10/2019: LFTs are wnl     5.  FEN/Renal:   - Lyte replacement per scheduled protocol.        6.  Mood: Continue Paxil. Told colleague yesterday: having lots of regret about stem cell transplant with low stem  cell dose.  SW discussed this with patient, see note on 11/14/2019     7. Derm:   - Rash noted on back and now chest. Non-bothersome. Derm consult. Likely heat rash. TCM cream. Derm bx pending.  CODE STATUS: Full Code  Discharge Instructions and Follow-Up:    Discharge diet: Regular diet as tolerated  Discharge activity: Activity as tolerated   Discharge follow-up: Follow up with BMT Clinic as follows: 11/19/19  Discharge Disposition:    Discharged to home.    Cassie Ferrari PA-C  11/14/2019

## 2019-11-14 NOTE — PROGRESS NOTES
[facility]    Transplant Infectious Diseases Inpatient Progress note      Angel Yanez MRN# [id number]   YOB: 1960 Age: 59 year old   Date of Admission: 11/9/2019  1:25 PM             Recommendations:   1. Will follow on next generation bacterial, mycobacterial, fungal PCR on LN biopsy from 10/18/2019, urine Histoplasma Ag, Rickettsiae serology, Anaplasma serology, as well as repeat Brucella/Bartonella/Coxiella/Chlamydia serology to assess the convalescent phase. Also Mycoplasma serology, and Toxoplasma PCR, HHV-8 PCR. I am also checking IgG4 (total IgG is normal so less likely to be elevated).   2. Agree with the excisional LN biopsy of the most prominent LN.   - Please send for pathology, leukemia/lymphoma, Gram stain, aerobic cx, anaerobic cx, fungal cx, AFB smear and cx, and send out to [facility] for next generation bacterial, mycobacterial, viral, fungal PCR.   3. We appreciate rheumatology recommendations; will test for urinary infection when off of ABx, ideally at least a week after stopping cefepime.         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         Active Problems and Infectious Diseases Issues:   1. FUO  Extensive infectious workup has been negative.   I think the most salient findings are fever and LA, so I appreciate your help arranging for the excisional LN to rule out lymphoma or infectious process.     It is possible that smoldering fungal infectious process not responding to itraconazole due to resistance (subtherapeutic is less likely given itraconazole level of 0.9, hydroxyitraconazole of 1.2, total of 2.1 which is therapeutic).   With history of travel to [facility] we checked for Coccidioides Ag which was negative   We are attempting to add on universal PCR to LN biopsy from 10/18/2019.   With history of recurrent fever, ? Response  to doxycycline, and travel to [place], we checked for rickettsial disease with negative serology.   I am also checking convalescent serologies.  IgG is within normal limit so IgG4 syndrome is less likely.   I am checking HHV-8 PCR though [facility] needs LN biopsy.     Adenovirus PCR negative.   Cocci Ag in blood and urine negative,         Old Problems and Infectious Diseases Issues:   1. P aeruginosa BSI due to line infection treated with cefepime and removal of the line.     Other Infectious Disease issues include:  - on itraconazole starting 10/18/2019.   - PCP prophylaxis: none.   - Serostatus: CMV+, EBV+, HSV1-/2+, VZV ?      Attestation:  I interviewed the patient and obtained history from the patient, and by reviewing the patient's chart including outside records, microbiological data, and radiological data. All data are summarized in this notes.  Collin Albarado MD   Pager: [phone]  11/14/2019      Interim History and Events:   Was started on eltrombopag for possible ITP 11/11/2019.   Looks more comfortable than yesterday. Fever curve has improved with higher dose of celebrex.   Still with fever however.   With mild non-productive cough.       ROS:  As mentioned in the interim history otherwise negative by reviewing constitutional symptoms, central and peripheral neurological systems, respiratory system, cardiac system, GI system,  system, musculoskeletal, skin, allergy, and lymphatics.                 Pysical Examination:  Temp: 97.7  F (36.5  C) Temp src: Axillary BP: 120/77 Pulse: 78 Heart Rate: 83 Resp: 18 SpO2: 96 % O2 Device: None (Room air)    Vitals:    11/11/19 0827 11/12/19 0854 11/13/19 0800 11/14/19 0810   Weight: 79.2 kg (174 lb 11.2 oz) 81.4 kg (179 lb 7.3 oz) 81.1 kg (178 lb 14.4 oz) 80.2 kg (176 lb 12.8 oz)     Constitutional: awake, alert, cooperative, appears at stated age, well nourished.       Medications:  Medications that Require Transfusion:     sodium chloride 10 mL/hr at 11/11/19  0942     Scheduled Medications:     acyclovir  800 mg Oral BID     [START ON 11/15/2019] ceFAZolin  2 g Intravenous Once     celecoxib  100 mg Oral BID     [START ON 11/15/2019] eltrombopag  100 mg Oral Daily     itraconazole  200 mg Oral BID     PARoxetine  20 mg Oral QAM       Laboratory Data:   Absolute CD4   Date Value Ref Range Status   11/12/2019 146 (L) 441 - 2,156 cells/uL Final       Inflammatory Markers    Recent Labs   Lab Test 11/10/19  0413 09/25/19  1022   *  --    .0* 160.0*       Immune Globulin Studies     Recent Labs   Lab Test 11/13/19  0440 11/06/19  1051 10/16/19  0930 09/25/19  1018 08/23/19  1301 06/11/19  0918 05/06/19  0936 01/29/19  0810 01/14/19  0945    1,210 920 507* 824 572* 636* 826 864   IGM  --  31*  --   --  25* 20* 26* 43* 50*   IGE  --   --   --   --   --   --  3  --  6   IGA  --  80  --   --  15* 41* 45* 311 327   IGG1 PENDING  --   --   --   --   --   --   --   --    IGG2 PENDING  --   --   --   --   --   --   --   --    IGG3 PENDING  --   --   --   --   --   --   --   --    IGG4 PENDING  --   --   --   --   --   --   --   --        Metabolic Studies       Recent Labs   Lab Test 11/14/19  0404 11/13/19 0440 11/12/19 1738 11/12/19  0318 11/11/19  1744 11/11/19  0854 11/10/19  0413 11/09/19  1800 11/09/19  1112  11/06/19  1051  10/20/19  1736 10/20/19  0359    135  --  136  --  136 134  --  134   < > 139   < >  --  136   POTASSIUM 3.5 3.4 3.5 3.7 3.8 2.9* 3.4  --  3.7   < > 3.9   < >  --  3.0*   CHLORIDE 103 102  --  105  --  106 104  --  102   < > 107   < >  --  105   CO2 26 24  --  24  --  22 25  --  25   < > 27   < >  --  23   ANIONGAP 8 8  --  7  --  8 6  --  8   < > 6   < >  --  8   BUN 21 17  --  15  --  22 18  --  16   < > 12   < >  --  16   CR 0.81 0.80  --  0.80  --  0.74 0.99  --  1.21   < > 0.81   < >  --  0.79   GFRESTIMATED >90 >90  --  >90  --  >90 82  --  64   < > >90   < >  --  >90   GLC 91 91  --  96  --  145* 144*  --  133*   < > 92    < >  --  92   ZHEN 8.2* 8.2*  --  8.1*  --  8.0* 8.0*  --  8.8   < > 9.0   < >  --  8.1*   PHOS  --   --   --   --   --   --   --   --   --   --  3.5  --   --  3.2   MAG  --   --   --   --   --   --   --   --   --   --  2.2  --   --  1.9   LACT  --   --   --   --   --   --   --  1.6  --   --   --   --  1.7  --     < > = values in this interval not displayed.       Hepatic Studies    Recent Labs   Lab Test 11/11/19  1744 11/10/19  0413 11/06/19  1051 11/01/19  0855 10/29/19  0921 10/25/19  1257 10/16/19  0930   BILITOTAL  --  0.8 0.8 0.7 0.6 0.5 0.8   ALKPHOS  --  101 124 135 153* 207* 114   ALBUMIN  --  2.6* 3.7 3.5 3.3* 3.2* 3.2*   AST  --  10 14 17 13 16 13   ALT  --  17 24 28 30 36 22    282* 196  --   --   --  214       Pancreatitis testing    Recent Labs   Lab Test 11/11/19  0854 10/22/19  0355   TRIG 138 186*       Hematology Studies      Recent Labs   Lab Test 11/14/19  0404 11/13/19  0440 11/12/19  0318 11/11/19  1744 11/11/19  0854 11/10/19  1950   WBC 2.4* 2.9* 2.7* 2.5* 2.5* 1.8*   ANEU 1.7 2.3 2.1 2.0 2.0 1.5*   ALYM 0.3* 0.2* 0.3* 0.1* 0.1* 0.2*   NEGAR 0.1 0.1 0.0 0.1 0.1 0.1   AEOS 0.4 0.3 0.3 0.4 0.4 0.1   HGB 8.0* 8.9* 9.0* 8.7* 7.6* 7.3*   HCT 24.6* 27.1* 27.2* 26.7* 23.2* 22.3*   PLT 15* 13* 29* 19* 16* 17*       Arterial Blood Gas Testing    Recent Labs   Lab Test 01/14/19  1029   PH 7.43   PCO2 36   PO2 90   HCO3 23   O2PER 21        Urine Studies     Recent Labs   Lab Test 11/10/19  1410 11/08/19  1148 09/25/19  0822 09/23/19  1757 07/21/19 2013   URINEPH 6.0 5.0 7.5* 6.0 6.0   NITRITE Negative Negative Negative Negative Negative   LEUKEST Negative Negative Negative Negative Negative   WBCU 2 3 <1 1 1       Microbiology:  Blood cx negative.     Last check of C difficile  C Diff Toxin B PCR   Date Value Ref Range Status   05/24/2019 Negative NEG^Negative Final     Comment:     Negative: Clostridium difficile target DNA sequences NOT detected, presumed   negative for Clostridium difficile  toxin B or the number of bacteria present   may be below the limit of detection for the test.  FDA approved assay performed using m[employer] GeneXpert real-time PCR.  A negative result does not exclude actual disease due to Clostridium difficile   and may be due to improper collection, handling and storage of the specimen   or the number of organisms in the specimen is below the detection limit of the   assay.         Virology:  CMV viral loads  No results found for: 23055, 11116, 11481, 43531  CMV viral loads    Recent Labs   Lab Test 11/12/19  0318 10/16/19  0930   CSPEC EDTA PLASMA Plasma, EDTA anticoagulant   CMVLOG Not Calculated Not Calculated       CMV viral loads    Log IU/mL of CMVQNT   Date Value Ref Range Status   11/12/2019 Not Calculated <2.1 [Log_IU]/mL Final   10/16/2019 Not Calculated <2.1 [Log_IU]/mL Final   10/08/2019 Not Calculated <2.1 [Log_IU]/mL Final   09/25/2019 Not Calculated <2.1 [Log_IU]/mL Final   08/23/2019 Not Calculated <2.1 [Log_IU]/mL Final   08/06/2019 Not Calculated <2.1 [Log_IU]/mL Final   07/15/2019 Not Calculated <2.1 [Log_IU]/mL Final   07/08/2019 Not Calculated <2.1 [Log_IU]/mL Final   07/01/2019 Not Calculated <2.1 [Log_IU]/mL Final   06/24/2019 Not Calculated <2.1 [Log_IU]/mL Final   06/17/2019 Not Calculated <2.1 [Log_IU]/mL Final   06/07/2019 Not Calculated <2.1 [Log_IU]/mL Final   05/29/2019 Not Calculated <2.1 [Log_IU]/mL Final   05/17/2019 Not Calculated <2.1 [Log_IU]/mL Final       CMV resistance testing  No lab results found.  No results found for: CMVCID, CMVFOS, CMVGAN     HHV6 DNA Result   Date Value Ref Range Status   11/12/2019 No HHV6 DNA detected NOHHV^No HHV6 DNA detected Copies/mL Final       EBV DNA Copies/mL   Date Value Ref Range Status   11/12/2019 <500 (A) EBVNEG^EBV DNA Not Detected [Copies]/mL Final     Comment:     EBV DNA Detected below the reportable range of 500 Copies/mL   10/16/2019 2,725 (A) EBVNEG^EBV DNA Not Detected [Copies]/mL Final    10/08/2019 1,203 (A) EBVNEG^EBV DNA Not Detected [Copies]/mL Final   09/25/2019 2,215 (A) EBVNEG^EBV DNA Not Detected [Copies]/mL Final       BK viral loads No lab results found.    Imaging:  CT c/a/p 11/9/2019  IMPRESSION: In this patient with history of multiple myeloma status  post bone marrow transplant:  1. No specific findings to explain patient's fever of unknown origin.   2. There is a 1.6 cm hypoenhancing ill-defined focus in the superior  pole the right kidney. This is thought to represent sequela of prior  inflammation. Occult malignancy is thought less likely but not  entirely excluded. Recommend follow-up MRI in outpatient setting.  3. The spine is severely demineralized with multilevel compression  fractures which are stable.  4. Stable bilateral axillary, mediastinal, and superficial inguinal  lymphadenopathy.  5. Stable small peripheral ill-defined hypodensities within the  spleen, nonspecific and may be infarctions.        Collin Albarado MD  Pager: [phone]

## 2019-11-14 NOTE — PROGRESS NOTES
RHEUMATOLOGY PROGRESS NOTE - Resident     Angel Yanez MRN# [id number]   Age: 59 year old YOB: 1960     Date of Admission:  11/9/2019    Assessment and Plan:   Summary: Angel Yanez is a 58 yo man D+178 s/p 2nd autologous PBSCT (cytoxan/melphalan) transplant for MM. S/p 2 recent readmissions for FUO (10/16-10/23; 9/23-9/29/19). Extensive infectious and cancer w/u has been done so far and all negative. Rheumatology was consulted for FUO.     Lymph node biopsy did not show evidence of HLH. Bone marrow biopsy 11/6 showed no morphologic or immunophenotypic evidence of plasma cell neoplasm. CRP in the 200's and ESR in the 100's makes infection more likely than autoimmune diseases.      Pt had history of psoriatic arthritis and was on methotrexate in the past. However, no evidence by physical exam or history of present inflammatory illness to support possible autoimmune disease flare with fever. Moreover, BINU, RF, anti CCP, ds DNA all negative. ANCA neg. CT chest showed fibrotic change. Not progressive compared to previous CT. Sruthi-1 negative. Soluble IL-2RA was elevated at 9,000.      Interval discussion: Fever was up to 102.7 overnight. Otherwise pt feels fine. Kidney lesion was not infection or malignancy per MRI. Soluble IL-2 RA was elevated at 9000. It can be elevated with many things, however, would raise a concern for sarcoidosis.     Problem list:  FUO  MM s/p autologous PBSCT  anemia, thrombocytopenia post BMT  intermittent neutropenia  Hx of psoriatic arthritis   Soluble IL-2 RA    Recommendations:  - Please evaluate possible granulomatous finding with lymph nose biopsy to r/o sarcoidosis in the setting of FUO and elevated soluble IL-2 RA    The patient was discussed with Dr. Tam.      Yane Gonzalez MD  Internal Medicine PGY-3  [phone]    I saw this patient with the medical resident. I agree with the stated findings and recommendations which reflect our joint impressions and plan.  Workup for recurrent FUO has yielded no specific autoimmune or systemic inflammatory diagnosis. Adult onset Still's disease is not present, but sarcoid remains a possibility. Agree with planned LN biopsy to rule out granulomatous disease.    Jerardo Tam M.D.  Staff Rheumatologist,  Health  Pager [phone]      Subjective/24 hour events:   Feels better today. Denied pain or rash.          Medications:     Current Facility-Administered Medications   Medication     acetaminophen (TYLENOL) tablet 325-650 mg     acetaminophen (TYLENOL) tablet 325-650 mg     acyclovir (ZOVIRAX) tablet 800 mg     albuterol (PROVENTIL) neb solution 2.5 mg     [START ON 11/15/2019] ceFAZolin (ANCEF) intermittent infusion 2 g in 100 mL dextrose PRE-MIX     celecoxib (celeBREX) capsule 100 mg     [START ON 11/15/2019] eltrombopag (PROMACTA) tablet 100 mg     itraconazole (SPORANOX) capsule 200 mg     magnesium sulfate 4 g in 100 mL sterile water (premade)     meperidine (DEMEROL) injection 12.5 mg     naloxone (NARCAN) injection 0.1-0.4 mg     PARoxetine (PAXIL) tablet 20 mg     polyethylene glycol (MIRALAX/GLYCOLAX) Packet 17 g     potassium chloride (KLOR-CON) Packet 20-40 mEq     potassium chloride 10 mEq in 100 mL intermittent infusion with 10 mg lidocaine     potassium chloride 10 mEq in 100 mL sterile water intermittent infusion (premix)     potassium chloride 20 mEq in 50 mL intermittent infusion     potassium chloride ER (K-DUR/KLOR-CON M) CR tablet 20-40 mEq     potassium phosphate 15 mmol in D5W 250 mL intermittent infusion     potassium phosphate 20 mmol in D5W 250 mL intermittent infusion     potassium phosphate 20 mmol in D5W 500 mL intermittent infusion     potassium phosphate 25 mmol in D5W 500 mL intermittent infusion     prochlorperazine (COMPAZINE) injection 5-10 mg    Or     prochlorperazine (COMPAZINE) tablet 5-10 mg     sodium chloride 0.9% infusion            Review of Systems:   Complete 8 point ROS completed and  negative unless mentioned in subjective.          Physical Exam:   /77 (BP Location: Left arm)   Pulse 78   Temp 97.7  F (36.5  C) (Axillary)   Resp 18   Wt 80.2 kg (176 lb 12.8 oz)   SpO2 96%   BMI 24.80 kg/m      Constitutional: NAD  HEENT: MMM, EOM intact, sclerae clear and anicteric  Heart: RRR, no murmurs appreciated  Resp: rales at base bilaterally+  Abd: Soft, non-tender, BS+  Extremities: No peripheral edema  Skin: no rash   MSK: MMT all 5/5, wrist flexion 50 degree, wrist extension 75 degree, other joints ROM full, no joints effusion or sign of synovitis.  Neuro: No focal deficits           Data:   Labs and imaging reviewed.     Yane Gonzalez MD  Internal Medicine PGY-3  [phone]

## 2019-11-14 NOTE — PLAN OF CARE
0514-2777: /68 (BP Location: Left arm)   Pulse 71   Temp 97.4  F (36.3  C) (Axillary)   Resp 18   Wt 81.1 kg (178 lb 14.4 oz)   SpO2 96%   BMI 25.09 kg/m    Patient afebrile overnight, denies complaints. Good UO. GRECO. PRBCs currently infusing. Received word from blood bank and shortage of platelets until 1000, platelets prepared for transfusion when available. Continue plan of care.  Problem: Adult Inpatient Plan of Care  Goal: Plan of Care Review  11/14/2019 0739 by Renee Parr, RN  Outcome: No Change     Problem: Fever (Fever with Neutropenia)  Goal: Baseline Body Temperature  11/14/2019 0739 by Renee Parr, RN  Outcome: No Change     Problem: Infection Risk (Fever with Neutropenia)  Goal: Absence of Infection  11/14/2019 0739 by Renee Parr, RN  Outcome: No Change

## 2019-11-14 NOTE — PROGRESS NOTES
[facility], [place]    Progress Note    Assessment & Plan   Procedure(s):  Right axillary lymph node biopsy, possible left   - Scheduled for tomorrow (11/15)    Plan:  Mr. Yanez is a 58 yo M with a h/o MM s/p 2 autologous bone marrow transplants now with fevers of unknown origin that have required 3 hospitalizations without an etiology.  - Biopsy tomorrow  - Would like platelets at 20  - NPO at midnight with MIVF starting at midnight  Principal Problem:    Multiple myeloma not having achieved remission (H)  Active Problems:    Recurrent fever    Seen and discussed with chief resident and staff.    Sofiya Arora  PGY 1 Family Medicine with General Sugery    Interval History   Stable.  Doing well. Fevers yesterday evening. UAL. Unable to receive platelets due to shortage, but will receive when available.      Physical Exam   Temp: 98.9  F (37.2  C) Temp src: Axillary BP: 125/76 Pulse: 76 Heart Rate: 78 Resp: 18 SpO2: 94 % O2 Device: None (Room air)    Vitals:    11/12/19 0854 11/13/19 0800 11/14/19 0810   Weight: 81.4 kg (179 lb 7.3 oz) 81.1 kg (178 lb 14.4 oz) 80.2 kg (176 lb 12.8 oz)     Vital Signs with Ranges  Temp:  [97.4  F (36.3  C)-102.7  F (39.3  C)] 98.9  F (37.2  C)  Pulse:  [69-76] 76  Heart Rate:  [71-97] 78  Resp:  [16-18] 18  BP: (102-152)/(65-89) 125/76  SpO2:  [92 %-98 %] 94 %  I/O last 3 completed shifts:  In: 1450 [P.O.:1150]  Out: 2650 [Urine:2650]    Constitutional: awake, alert, cooperative, no apparent distress, and appears stated age  Eyes: Lids and lashes normal, pupils equal, round and reactive to light, extra ocular muscles intact, sclera clear, conjunctiva normal  GI: No scars, normal bowel sounds, soft, non-distended, non-tender, no masses palpated, no hepatosplenomegally  Skin: normal skin color, texture, turgor and no rashes  Musculoskeletal: no lower extremity pitting edema present  full range of motion noted        Sofiya Arora MD 11/14/2019 8:58  AM  PGY 1 Family Medicine with General Surgery    Medications     sodium chloride 10 mL/hr at 11/11/19 0942        acyclovir  800 mg Oral BID     celecoxib  100 mg Oral BID     eltrombopag  50 mg Oral Daily     itraconazole  200 mg Oral BID     PARoxetine  20 mg Oral QAM       Data   Recent Labs   Lab 11/14/19  0404 11/13/19  0440 11/12/19  1738 11/12/19  0318  11/10/19  0413 11/09/19  1800   WBC 2.4* 2.9*  --  2.7*   < > 2.8*  --    HGB 8.0* 8.9*  --  9.0*   < > 6.8*  --    MCV 95 95  --  95   < > 106*  --    PLT 15* 13*  --  29*   < > 8*  --    INR 1.24*  --   --  1.27*  --   --  1.40*    135  --  136   < > 134  --    POTASSIUM 3.5 3.4 3.5 3.7   < > 3.4  --    CHLORIDE 103 102  --  105   < > 104  --    CO2 26 24  --  24   < > 25  --    BUN 21 17  --  15   < > 18  --    CR 0.81 0.80  --  0.80   < > 0.99  --    ANIONGAP 8 8  --  7   < > 6  --    ZHEN 8.2* 8.2*  --  8.1*   < > 8.0*  --    GLC 91 91  --  96   < > 144*  --    ALBUMIN  --   --   --   --   --  2.6*  --    PROTTOTAL  --   --   --   --   --  6.0*  --    BILITOTAL  --   --   --   --   --  0.8  --    ALKPHOS  --   --   --   --   --  101  --    ALT  --   --   --   --   --  17  --    AST  --   --   --   --   --  10  --     < > = values in this interval not displayed.     Recent Results (from the past 24 hour(s))   XR Chest 2 Views    Narrative    EXAM: XR CHEST 2 VW  11/13/2019 10:15 AM      HISTORY: sob, crackles on exam    COMPARISON: CT 11/9/2019, radiograph 11/8/2019    FINDINGS: PA and lateral radiograph of the chest. The trachea is  aligned. The cardiac silhouette is within normal limits. Increased  prominence of the interstitium. Patchy bibasilar opacities. Small  bilateral pleural effusions. Visualized upper abdomen is unremarkable.  Multilevel degenerative changes in the thoracic spine. Unchanged  compression deformities of multiple lower thoracolumbar vertebrae.       Impression    IMPRESSION:   1. New small bilateral pleural effusions.  2.  Increased prominence of the interstitium suggesting interstitial  edema.  3. Patchy airspace opacities in the lung bases suggesting atelectasis  versus developing alveolar pulmonary edema.    I have personally reviewed the examination and initial interpretation  and I agree with the findings.    JAKE JOSEPH MD   MR Renal wo & w Contrast    Narrative    EXAMINATION: MR RENAL WO & W CONTRAST, 11/13/2019 2:41 PM    COMPARISON: 11/9/2019, 10/18/2019, 8/23/2019.    HISTORY: 1.6 cm hypoenhancing ill-defined focus in the superior pole  of R kidney on CT. Would like to r/o malignancy in patient with FUO.    TECHNIQUE:   Images were acquired with and without gadolinium contrast through the  abdomen. Multiplanar T2, axial T1 in/out of phase, and diffusion  weighted images were obtained without contrast. Multiplanar  T1-weighted images with fat saturation were acquired at the multiple  time points following the administration of intravenous contrast.  Contrast dose: 8 mL Gadavist.    FINDINGS:    Right kidney: Ill-defined hypoenhancing focus measuring 1.5 x 1.1 x  1.2 cm in the superior right kidney which demonstrates mild diffusion  restriction. The renal contour is maintained, and this does not have a  masslike appearance. The lesion is mildly T2 hyperintense and T1  isointense. No hydronephrosis.    Left kidney: No hydronephrosis. No suspicious renal mass. Scattered T2  hyperintense cysts.    Remainder of abdomen: The liver surface is smooth. No hepatic  steatosis or iron deposition. No focal liver mass. Stable prominence  of the spleen, measuring 13.7 cm in length.  The gallbladder and  adrenal glands are normal. The normal T1 hyperintense parenchymal  signal is maintained. No solid or cystic pancreatic lesions.    No intra-abdominal free fluid. No abnormally dilated loops of bowel.  No lymphadenopathy in the abdomen. Mottled appearance of the bone  marrow throughout compatible with multiple myeloma.  Unchanged  appearance of numerous compression deformities in the thoracolumbar  spine. Small bilateral pleural effusions. Mild periportal edema.        Impression    IMPRESSION:   1. Stable ill-defined hypoenhancing region in the upper pole the right  kidney with associated mild diffusion restriction. Appearance is  nonspecific and differential considerations would include infectious,  focal inflammatory, or other infiltrative etiologies. Appearance is  not suggestive of urothelial or renal cell carcinoma.  2. Unchanged osseous findings of multiple myeloma and associated  compression deformities.  2. Mild periportal edema.  4. Small bilateral pleural effusions.    I have personally reviewed the examination and initial interpretation  and I agree with the findings.    MELODY ZIEGLER MD

## 2019-11-14 NOTE — PROGRESS NOTES
BMT Progress Note    ID: Angel Yanez is a 60 yo man D+181 s/p 2nd autologous transplant for MM. S/p 2 recent readmissions for FUO (10/16-10/23; 9/23-9/29/19).     HPI: Continues with fevers. Mentally and physically feels he is deteriorating. Still gets dyspneic fairly easy. Dry cough. Breathing somewhat better post lasix. Mentally it is getting tough to be here and having lots of regret about stem cell transplant with low stem cell dose.      ROS: Negative except as stated above.    Physical Exam  /76   Pulse 76   Temp 98.9  F (37.2  C) (Axillary)   Resp 18   Wt 80.2 kg (176 lb 12.8 oz)   SpO2 94%   BMI 24.80 kg/m       Constitutional: +Less SOB noted with speaking today. +Pale   HEENT: MMM, EOM intact, sclerae clear and anicteric  Heart: Tachycardic but RR, no murmurs appreciated  Resp: On RA +bibasilar lung field crackles stable.  Abd: Soft, non-tender, BS+  Lymph: No peripheral edema  Skin: +erythematous maculopapular rash noted on back. +somewhat acne like.   Neuro: No focal deficits    Labs  Lab Results   Component Value Date    WBC 2.4 (L) 11/14/2019    ANEU 1.7 11/14/2019    HGB 8.0 (L) 11/14/2019    HCT 24.6 (L) 11/14/2019    PLT 15 (LL) 11/14/2019     11/14/2019    POTASSIUM 3.5 11/14/2019    CHLORIDE 103 11/14/2019    CO2 26 11/14/2019    GLC 91 11/14/2019    BUN 21 11/14/2019    CR 0.81 11/14/2019    MAG 2.2 11/06/2019    INR 1.24 (H) 11/14/2019    BILITOTAL 0.8 11/10/2019    AST 10 11/10/2019    ALT 17 11/10/2019    ALKPHOS 101 11/10/2019    PROTTOTAL 6.0 (L) 11/10/2019    ALBUMIN 2.6 (L) 11/10/2019     A/P:  Angel Yanez is a 60 yo man D+181 s/p 2nd autologous transplant for MM. S/p 2 recent readmissions for FUO (10/16-10/23; 9/23-9/29/19).      1.  BMT/MM:   Slow engraftment; cell dose was only 0.639x10^6. (Marrow [place] - known poor cell dose as failed chemo-mobilization 1/2019.)   - Stringent CR.  - day +180 bone marrow biopsy 11/6 which showed no morphologic or  immunophenotypic evidence of plasma cell neoplasm.   - PET in 8/2019 clear.      2.  HEME: Keep Hgb>8g/dL, plt >20 (epistaxis requiring rhinorocket packing in last admission).   - anemic, thrombocytopenic post BMT, low cell dose, +/- infection.   - intermittent neutropenia: GCSF last on 11/6; give prn ANC </=1000.   - No evidence of HLH on lymphnode bx 10/18/19.     - Persistent thrombocytopenia: BM 11/6/19 showed adequate granulopoiesis and erythropoiesis but his platelet precursors were 0 to absent which is reflected in peripheral CBC. Started promactic 50mcg daily on 11/10. Will increase to 100mg and pending results go to max dose in a few days.   - Hemolysis eval neg.   - prolonged INR. Improved with Vit K.   - Will pre-order 2 bags of plts tomorrow as surgery would like him >30K and he has not been getting >30K with just one bag.        3.  ID: FUO: tmax 102.7 with scheduled celebrex.  A.) Extensive ID work up unremarkable x 2 sept and oct. 10/16 and 11/9 repeat chest CT: persistent mediastinal adenopathy no paranchymal disease. S/P bx of L axillary node on 10/18: No evidence of HLH, tissues looks reactive.   - ID consulted 11/10, appreciate recs. Extensive ID testing negative but also lots still pending. Following cx results daily.   - Consulted general surger for excisional lymph node bx (neg core bx 10/18 L axillary). Would like R axillary node (please send for pathology, leukemia/lymphoma, Gram stain, aerobic cx, anaerobic cx, fungal cx, AFB smear and cx, and send out to [facility] for next generation bacterial, mycobacterial, fungal PCR. This is planned for 11/5 with Dr. Irving. I have ordered all studies and notified surgery of this. Ancef ordered pre-op.  - HTLV neg with very low lymphocyte count. CD4 count 146.   - Per ID with negative w/u and not neutropenic discontinued cefepime 11/12.  - Per ID, possible histoplasmosis: empiric itraconazole 200 TID started last admission with fevers  initially resolved but now recurred; currently on bid dosing. Pt declining recommened ID follow-up in December. 10/18 Histo/blasto urine antigen negative. Per Dr Kelley flucaonzole can suppress growth? 10/18 [place] DNA test- negative. itra level pending.    - prophy ACV, Pentamidine (last 10/17/19)    B.) Autoimmune workup so far negative. Rheum consult 11/11 feels autoimmune disease less likely with neg studies above and CRP and ESR elevated. Rec possible CNS w/u for infection--nothing further today.     C.) Malignancy W/U: Neg BM BX 11/6. Neg PET in 8/2019. Neg core lymph node needle bx 10/18. PSA neg. Admit CT with R kidney hypoenhancing ill-defined focus. MRI 11/13 not suggestive of urothelial or renal cell carcinoma.       4. Pulm:   - tachypnea/SOB: mainly with fevers. On RA. Imaging w/ contrast neg. Crackles and fluid up. CXR showing small effusions now and interstitial and pulm edema. Will give lasix 40mg again today as down 2lbs from yesterday. Walking sats 96% on RA.  - 10/17: Echo with preserved EF, no evidence of right heart strain. Repeat echo today.  - 10/17 VQ scan neg for PE.     5. GI:    - LFTs are wnl     5.  FEN/Renal:   - Lyte replacement per scheduled protocol.  Will give 40meq KCL x 1 today as giving lasix.      6.  Mood: Continue Paxil.  - Social work to touch base with Guille today.      7. Derm:   - Rash noted on back. Non-bothersome. Seems somewhat acne like with fevers/sweats/lying in bed. Monitoring. Asked attending to examine and decide if anything further warranted.     Disposition: Excisional lymph node bx tomorrow by surgery then there has been some discussion could Guille discharge tomorrow afternoon or over the weekend with stable clinical condition despite continued FUO. Meds sent. Blood product/BMT Therapy plan up to date. Pentamidine in there for Monday in clinic. Notes shared.     Cassie Ferrari PA-C  #[id number]    The patient was seen and examined by me separately from the midlevel  provider. The note reflects our mutual assessment and plans and were approved by me personally.   I personally reviewed today's lab results vital signs and radiology results.    Each point of the assessment and plan were reviewed by the midlevel and me and either endorsed by me or were my added decisions.    My pertinent physical findings today are: Febrile, clear lungs and no rash.    My assessment and plan are: 60 yo 180 day post second auto PBSC for myeloma admitted for w/u FUO. CT CAP -. On itraconozole for possible  Histoplasmosis. Do connective tissue disease autoimmune w/u (R/O rheumatoid arthritis, SLE ans also Lyme disease.) Consult ID. Schedule celebrex for comfort. Consult rheumatology. Increase celebres to 100 mg po BID. Check PSA. Appreciate  ID and rheum recommendation for extensive w/u: will implement. Consider repeat lymph node biopsy. MRI kidney to evaluate small mass. CXR, check RVP.   Axillary lymph node bx tomorrow. Cardia echo for new pleural effusions.    Stephan Godwin M.D.

## 2019-11-14 NOTE — PROGRESS NOTES
"CLINICAL    Blood and Marrow Transplant Service      Focus: Counseling    Data:  Guille is admitted to .  Guille is Day + 181 s/p 2nd Autologous transplant for his diagnosis of Multiple Myeloma.    Intervention:   Spoke with Guille in room - he was sitting at the bedside table having cornflakes. Guille shared that he is feeling better today - he does endorse feeling frustrated with his course post-transplant. He would like to know why he is having fevers and is frustrated that the team has not found any reason. Guille shared that he is \"a loner\" and prefers to take care of his emotional needs on his own. He describes support from his family and friends. Guille was tearful during our discussion when talking about how he is coping and about his family. Guille shared that he feels this 2nd transplant was not the best choice and has been an \"astronomical waste\" of time and money. He shared some concerns about insurance continuing to cover his cares - we talked about how his insurance gave permission for his transplant.      We talked about what would be most helpful for us to do to support Guille. Guille said knowing why he is having fevers would be preferred but his preference is to not have appointments to discuss his coping and mental health. He prefers to contact the Clinical Social Work team should he require support.     Provided assessment of coping, supportive counseling, validation of concerns, encouragement and resources     Assessment:  Guille is coping appropriately with complicated treatment course - he is asking appropriate questions and sharing his feelings with those he feels most comfortable with.     Plan: Encouraged patient to express any questions/concerns as they arise. SW to remain available supportively and work with the interdisciplinary team regarding patient's plan of care.    Janine MOFFETT [facility]    Clinical   11/14/2019  [facility]  Adult Blood and Marrow Transplant " Program  [address] [address]  [email or web address]  [email or web address]  Office: [phone]   Pager: [phone]

## 2019-11-14 NOTE — PROVIDER NOTIFICATION
11/13/19 1938   Vitals   Temp 102.7  F (39.3  C)   Temp src Axillary   MD Barr notified of fever. Other VSS. Tylenol given.

## 2019-11-14 NOTE — SUMMARY OF CARE
BMT Summary of Care    November 14, 2019 12:03 PM  Angel Yaenz  MRN: [id number]    Discharge Date: 11/17/2019    BMT Primary Physician: Dr. Lucio    BMT Nurse Coordinator: Su Pate    Discharge Diagnosis: S/P readmission for Fever of unknown origin.    Discharge To: Home    Activity: As tolerated.     Nutrition: Regular diet as tolerated    Blood Transfusions:  Transfuse if Hemoglobin < or equal 8 mg/dL  Red Blood Cell Order: 2 units, irradiated and leukoreduced   Transfuse if Platelet count < or equal 20 uL  Platelet order: 1 adult dose, irradiated and leukoreduced    Laboratory Tests:  At next clinic appointment (date: 11/19/2019)  Hemogram (CBC) differential, platelet count  Comprehensive  Type and screen    Appointments:   BMT Clinic (date, time, provider):   1.  BMT Clinic:  11/19/2019;  Labs at 12:30 pm, infusion of possible platelets and or possible RBC, scheduled to see Dr Lucio at 5:00 pm. Will likely need blood and plts.     BMT Contact Information:-   For issues including fevers 100.5 or more:  Please call during the week: Monday through Friday between hours of 8:00 am and 4:30 pm- Call BMT office- [phone]  After hours/Weekends: Please call [facility] : [phone] and ask for BMT physician on call and the  will have the BMT physician call you back.    Cassie Ferrari PA-C  #[id number]

## 2019-11-14 NOTE — PLAN OF CARE
Temp max was during day time 99.8, however continue to expressed SOB when walking, lasix was administrated per order, had fair oral intake, MRI was done today, continue to monitor

## 2019-11-15 ENCOUNTER — APPOINTMENT (OUTPATIENT)
Dept: CT IMAGING | Facility: CLINIC | Age: 59
DRG: 853 | End: 2019-11-15
Attending: PHYSICIAN ASSISTANT
Payer: COMMERCIAL

## 2019-11-15 ENCOUNTER — ANESTHESIA (OUTPATIENT)
Dept: SURGERY | Facility: CLINIC | Age: 59
End: 2019-11-15

## 2019-11-15 LAB
ANION GAP SERPL CALCULATED.3IONS-SCNC: 6 MMOL/L (ref 3–14)
ANISOCYTOSIS BLD QL SMEAR: ABNORMAL
ANISOCYTOSIS BLD QL SMEAR: ABNORMAL
B HENSELAE IGG TITR SER IF: NORMAL {TITER}
B HENSELAE IGM TITR SER IF: NORMAL {TITER}
B QUINTANA IGG TITR SER: NORMAL {TITER}
B QUINTANA IGM TITR SER: NORMAL {TITER}
BACTERIA SPEC CULT: NO GROWTH
BASOPHILS # BLD AUTO: 0 10E9/L (ref 0–0.2)
BASOPHILS # BLD AUTO: 0 10E9/L (ref 0–0.2)
BASOPHILS NFR BLD AUTO: 0 %
BASOPHILS NFR BLD AUTO: 0 %
BLD PROD TYP BPU: NORMAL
BLD UNIT ID BPU: 0
BLD UNIT ID BPU: 0
BLOOD PRODUCT CODE: NORMAL
BLOOD PRODUCT CODE: NORMAL
BPU ID: NORMAL
BPU ID: NORMAL
BUN SERPL-MCNC: 20 MG/DL (ref 7–30)
C BURNET PH1 IGG SER QL IF: NEGATIVE
C BURNET PH2 IGG SER QL IF: NEGATIVE
CALCIUM SERPL-MCNC: 8.1 MG/DL (ref 8.5–10.1)
CHLORIDE SERPL-SCNC: 104 MMOL/L (ref 94–109)
CO2 SERPL-SCNC: 28 MMOL/L (ref 20–32)
COPATH REPORT: NORMAL
CREAT SERPL-MCNC: 0.71 MG/DL (ref 0.66–1.25)
DIFFERENTIAL METHOD BLD: ABNORMAL
DIFFERENTIAL METHOD BLD: ABNORMAL
EOSINOPHIL # BLD AUTO: 0.4 10E9/L (ref 0–0.7)
EOSINOPHIL # BLD AUTO: 0.4 10E9/L (ref 0–0.7)
EOSINOPHIL NFR BLD AUTO: 16.7 %
EOSINOPHIL NFR BLD AUTO: 18.9 %
ERYTHROCYTE [DISTWIDTH] IN BLOOD BY AUTOMATED COUNT: 21.8 % (ref 10–15)
ERYTHROCYTE [DISTWIDTH] IN BLOOD BY AUTOMATED COUNT: 22.1 % (ref 10–15)
GFR SERPL CREATININE-BSD FRML MDRD: >90 ML/MIN/{1.73_M2}
GLUCOSE BLDC GLUCOMTR-MCNC: 97 MG/DL (ref 70–99)
GLUCOSE SERPL-MCNC: 89 MG/DL (ref 70–99)
HCT VFR BLD AUTO: 26.9 % (ref 40–53)
HCT VFR BLD AUTO: 28 % (ref 40–53)
HGB BLD-MCNC: 8.8 G/DL (ref 13.3–17.7)
HGB BLD-MCNC: 9 G/DL (ref 13.3–17.7)
IGG SERPL-MCNC: 714 MG/DL (ref 695–1620)
IGG1 SER-MCNC: 424 MG/DL (ref 382–929)
IGG2 SER-MCNC: 156 MG/DL (ref 242–700)
IGG3 SER-MCNC: 58 MG/DL (ref 22–176)
IGG4 SER-MCNC: 13 MG/DL (ref 4–86)
INR PPP: 1.17 (ref 0.86–1.14)
LYMPHOCYTES # BLD AUTO: 0.4 10E9/L (ref 0.8–5.3)
LYMPHOCYTES # BLD AUTO: 0.4 10E9/L (ref 0.8–5.3)
LYMPHOCYTES NFR BLD AUTO: 18.4 %
LYMPHOCYTES NFR BLD AUTO: 18.9 %
Lab: NORMAL
M PNEUMO IGG SER IA-ACNC: 0.07 U/L
M PNEUMO IGM SER IA-ACNC: 0.11 U/L
MCH RBC QN AUTO: 31.6 PG (ref 26.5–33)
MCH RBC QN AUTO: 31.7 PG (ref 26.5–33)
MCHC RBC AUTO-ENTMCNC: 32.1 G/DL (ref 31.5–36.5)
MCHC RBC AUTO-ENTMCNC: 32.7 G/DL (ref 31.5–36.5)
MCV RBC AUTO: 97 FL (ref 78–100)
MCV RBC AUTO: 98 FL (ref 78–100)
MICROCYTES BLD QL SMEAR: PRESENT
MONOCYTES # BLD AUTO: 0 10E9/L (ref 0–1.3)
MONOCYTES # BLD AUTO: 0.1 10E9/L (ref 0–1.3)
MONOCYTES NFR BLD AUTO: 1.8 %
MONOCYTES NFR BLD AUTO: 5.3 %
NEUTROPHILS # BLD AUTO: 1.3 10E9/L (ref 1.6–8.3)
NEUTROPHILS # BLD AUTO: 1.3 10E9/L (ref 1.6–8.3)
NEUTROPHILS NFR BLD AUTO: 59.6 %
NEUTROPHILS NFR BLD AUTO: 60.4 %
NUM BPU REQUESTED: 1
OVALOCYTES BLD QL SMEAR: SLIGHT
PLATELET # BLD AUTO: 27 10E9/L (ref 150–450)
PLATELET # BLD AUTO: 57 10E9/L (ref 150–450)
PLATELET # BLD EST: ABNORMAL 10*3/UL
POIKILOCYTOSIS BLD QL SMEAR: SLIGHT
POIKILOCYTOSIS BLD QL SMEAR: SLIGHT
POLYCHROMASIA BLD QL SMEAR: ABNORMAL
POLYCHROMASIA BLD QL SMEAR: SLIGHT
POTASSIUM SERPL-SCNC: 3.2 MMOL/L (ref 3.4–5.3)
POTASSIUM SERPL-SCNC: 3.3 MMOL/L (ref 3.4–5.3)
RBC # BLD AUTO: 2.78 10E12/L (ref 4.4–5.9)
RBC # BLD AUTO: 2.85 10E12/L (ref 4.4–5.9)
SODIUM SERPL-SCNC: 138 MMOL/L (ref 133–144)
SPECIMEN SOURCE: NORMAL
TRANSFUSION STATUS PATIENT QL: NORMAL
WBC # BLD AUTO: 2.1 10E9/L (ref 4–11)
WBC # BLD AUTO: 2.2 10E9/L (ref 4–11)

## 2019-11-15 PROCEDURE — 81342 TRG GENE REARRANGEMENT ANAL: CPT | Performed by: INTERNAL MEDICINE

## 2019-11-15 PROCEDURE — 88184 FLOWCYTOMETRY/ TC 1 MARKER: CPT | Performed by: INTERNAL MEDICINE

## 2019-11-15 PROCEDURE — 71250 CT THORAX DX C-: CPT

## 2019-11-15 PROCEDURE — 00000159 ZZHCL STATISTIC H-SEND OUTS PREP: Performed by: SURGERY

## 2019-11-15 PROCEDURE — 20600000 ZZH R&B BMT

## 2019-11-15 PROCEDURE — 40001004 ZZHCL STATISTIC FLOW INT 9-15 ABY TC 88188: Performed by: INTERNAL MEDICINE

## 2019-11-15 PROCEDURE — 87077 CULTURE AEROBIC IDENTIFY: CPT | Performed by: PHYSICIAN ASSISTANT

## 2019-11-15 PROCEDURE — 25000132 ZZH RX MED GY IP 250 OP 250 PS 637: Performed by: STUDENT IN AN ORGANIZED HEALTH CARE EDUCATION/TRAINING PROGRAM

## 2019-11-15 PROCEDURE — 88341 IMHCHEM/IMCYTCHM EA ADD ANTB: CPT | Performed by: SURGERY

## 2019-11-15 PROCEDURE — 88185 FLOWCYTOMETRY/TC ADD-ON: CPT | Performed by: INTERNAL MEDICINE

## 2019-11-15 PROCEDURE — 84132 ASSAY OF SERUM POTASSIUM: CPT | Performed by: PHYSICIAN ASSISTANT

## 2019-11-15 PROCEDURE — 25000125 ZZHC RX 250: Performed by: NURSE ANESTHETIST, CERTIFIED REGISTERED

## 2019-11-15 PROCEDURE — 88312 SPECIAL STAINS GROUP 1: CPT | Mod: TC | Performed by: DERMATOLOGY

## 2019-11-15 PROCEDURE — 36415 COLL VENOUS BLD VENIPUNCTURE: CPT | Performed by: INTERNAL MEDICINE

## 2019-11-15 PROCEDURE — 36415 COLL VENOUS BLD VENIPUNCTURE: CPT | Performed by: PHYSICIAN ASSISTANT

## 2019-11-15 PROCEDURE — 86923 COMPATIBILITY TEST ELECTRIC: CPT | Performed by: INTERNAL MEDICINE

## 2019-11-15 PROCEDURE — P9037 PLATE PHERES LEUKOREDU IRRAD: HCPCS | Performed by: PHYSICIAN ASSISTANT

## 2019-11-15 PROCEDURE — 88365 INSITU HYBRIDIZATION (FISH): CPT | Performed by: SURGERY

## 2019-11-15 PROCEDURE — 25000128 H RX IP 250 OP 636: Performed by: NURSE ANESTHETIST, CERTIFIED REGISTERED

## 2019-11-15 PROCEDURE — 85025 COMPLETE CBC W/AUTO DIFF WBC: CPT | Performed by: INTERNAL MEDICINE

## 2019-11-15 PROCEDURE — 27210794 ZZH OR GENERAL SUPPLY STERILE: Performed by: SURGERY

## 2019-11-15 PROCEDURE — 25000128 H RX IP 250 OP 636: Performed by: PHYSICIAN ASSISTANT

## 2019-11-15 PROCEDURE — 25000132 ZZH RX MED GY IP 250 OP 250 PS 637: Performed by: INTERNAL MEDICINE

## 2019-11-15 PROCEDURE — 37000008 ZZH ANESTHESIA TECHNICAL FEE, 1ST 30 MIN: Performed by: SURGERY

## 2019-11-15 PROCEDURE — 00000160 ZZHCL STATISTIC H-SPECIAL HANDLING: Performed by: SURGERY

## 2019-11-15 PROCEDURE — 88312 SPECIAL STAINS GROUP 1: CPT | Performed by: SURGERY

## 2019-11-15 PROCEDURE — 0HB6XZX EXCISION OF BACK SKIN, EXTERNAL APPROACH, DIAGNOSTIC: ICD-10-PCS | Performed by: DERMATOLOGY

## 2019-11-15 PROCEDURE — 80048 BASIC METABOLIC PNL TOTAL CA: CPT | Performed by: INTERNAL MEDICINE

## 2019-11-15 PROCEDURE — 25800030 ZZH RX IP 258 OP 636: Performed by: NURSE ANESTHETIST, CERTIFIED REGISTERED

## 2019-11-15 PROCEDURE — 36000051 ZZH SURGERY LEVEL 2 1ST 30 MIN - UMMC: Performed by: SURGERY

## 2019-11-15 PROCEDURE — 40000428 ZZHCL STATISTIC R-RUSH PROCESSING: Performed by: DERMATOLOGY

## 2019-11-15 PROCEDURE — 25000125 ZZHC RX 250: Performed by: SURGERY

## 2019-11-15 PROCEDURE — 25000132 ZZH RX MED GY IP 250 OP 250 PS 637: Performed by: DERMATOLOGY

## 2019-11-15 PROCEDURE — 07B50ZX EXCISION OF RIGHT AXILLARY LYMPHATIC, OPEN APPROACH, DIAGNOSTIC: ICD-10-PCS | Performed by: SURGERY

## 2019-11-15 PROCEDURE — P9037 PLATE PHERES LEUKOREDU IRRAD: HCPCS | Performed by: INTERNAL MEDICINE

## 2019-11-15 PROCEDURE — 85610 PROTHROMBIN TIME: CPT | Performed by: RADIOLOGY

## 2019-11-15 PROCEDURE — 86850 RBC ANTIBODY SCREEN: CPT | Performed by: INTERNAL MEDICINE

## 2019-11-15 PROCEDURE — 36415 COLL VENOUS BLD VENIPUNCTURE: CPT | Performed by: RADIOLOGY

## 2019-11-15 PROCEDURE — 88305 TISSUE EXAM BY PATHOLOGIST: CPT | Mod: TC | Performed by: DERMATOLOGY

## 2019-11-15 PROCEDURE — 00000146 ZZHCL STATISTIC GLUCOSE BY METER IP

## 2019-11-15 PROCEDURE — 87186 SC STD MICRODIL/AGAR DIL: CPT | Performed by: PHYSICIAN ASSISTANT

## 2019-11-15 PROCEDURE — 88342 IMHCHEM/IMCYTCHM 1ST ANTB: CPT | Performed by: SURGERY

## 2019-11-15 PROCEDURE — 71000014 ZZH RECOVERY PHASE 1 LEVEL 2 FIRST HR: Performed by: SURGERY

## 2019-11-15 PROCEDURE — 37000009 ZZH ANESTHESIA TECHNICAL FEE, EACH ADDTL 15 MIN: Performed by: SURGERY

## 2019-11-15 PROCEDURE — 36000053 ZZH SURGERY LEVEL 2 EA 15 ADDTL MIN - UMMC: Performed by: SURGERY

## 2019-11-15 PROCEDURE — 87205 SMEAR GRAM STAIN: CPT | Performed by: PHYSICIAN ASSISTANT

## 2019-11-15 PROCEDURE — 85025 COMPLETE CBC W/AUTO DIFF WBC: CPT | Performed by: RADIOLOGY

## 2019-11-15 PROCEDURE — 86900 BLOOD TYPING SEROLOGIC ABO: CPT | Performed by: INTERNAL MEDICINE

## 2019-11-15 PROCEDURE — 40000170 ZZH STATISTIC PRE-PROCEDURE ASSESSMENT II: Performed by: SURGERY

## 2019-11-15 PROCEDURE — 87070 CULTURE OTHR SPECIMN AEROBIC: CPT | Performed by: PHYSICIAN ASSISTANT

## 2019-11-15 PROCEDURE — 87102 FUNGUS ISOLATION CULTURE: CPT | Performed by: PHYSICIAN ASSISTANT

## 2019-11-15 PROCEDURE — 86901 BLOOD TYPING SEROLOGIC RH(D): CPT | Performed by: INTERNAL MEDICINE

## 2019-11-15 PROCEDURE — 88305 TISSUE EXAM BY PATHOLOGIST: CPT | Performed by: SURGERY

## 2019-11-15 RX ORDER — LIDOCAINE HYDROCHLORIDE 20 MG/ML
INJECTION, SOLUTION INFILTRATION; PERINEURAL PRN
Status: DISCONTINUED | OUTPATIENT
Start: 2019-11-15 | End: 2019-11-18

## 2019-11-15 RX ORDER — ONDANSETRON 2 MG/ML
4 INJECTION INTRAMUSCULAR; INTRAVENOUS EVERY 30 MIN PRN
Status: DISCONTINUED | OUTPATIENT
Start: 2019-11-15 | End: 2019-11-15 | Stop reason: HOSPADM

## 2019-11-15 RX ORDER — SODIUM CHLORIDE, SODIUM LACTATE, POTASSIUM CHLORIDE, CALCIUM CHLORIDE 600; 310; 30; 20 MG/100ML; MG/100ML; MG/100ML; MG/100ML
INJECTION, SOLUTION INTRAVENOUS CONTINUOUS
Status: DISCONTINUED | OUTPATIENT
Start: 2019-11-15 | End: 2019-11-15 | Stop reason: HOSPADM

## 2019-11-15 RX ORDER — PROPOFOL 10 MG/ML
INJECTION, EMULSION INTRAVENOUS PRN
Status: DISCONTINUED | OUTPATIENT
Start: 2019-11-15 | End: 2019-11-18

## 2019-11-15 RX ORDER — LIDOCAINE 40 MG/G
CREAM TOPICAL
Status: DISCONTINUED | OUTPATIENT
Start: 2019-11-15 | End: 2019-11-17 | Stop reason: HOSPADM

## 2019-11-15 RX ORDER — NICOTINE POLACRILEX 4 MG
15-30 LOZENGE BUCCAL
Status: DISCONTINUED | OUTPATIENT
Start: 2019-11-15 | End: 2019-11-17 | Stop reason: HOSPADM

## 2019-11-15 RX ORDER — TRIAMCINOLONE ACETONIDE 1 MG/G
CREAM TOPICAL 2 TIMES DAILY
Status: DISCONTINUED | OUTPATIENT
Start: 2019-11-15 | End: 2019-11-17 | Stop reason: HOSPADM

## 2019-11-15 RX ORDER — LIDOCAINE HYDROCHLORIDE AND EPINEPHRINE 10; 10 MG/ML; UG/ML
1 INJECTION, SOLUTION INFILTRATION; PERINEURAL ONCE
Status: DISCONTINUED | OUTPATIENT
Start: 2019-11-15 | End: 2019-11-16

## 2019-11-15 RX ORDER — CEFAZOLIN SODIUM 2 G/100ML
2 INJECTION, SOLUTION INTRAVENOUS
Status: DISCONTINUED | OUTPATIENT
Start: 2019-11-15 | End: 2019-11-15

## 2019-11-15 RX ORDER — FUROSEMIDE 10 MG/ML
40 INJECTION INTRAMUSCULAR; INTRAVENOUS ONCE
Status: COMPLETED | OUTPATIENT
Start: 2019-11-15 | End: 2019-11-15

## 2019-11-15 RX ORDER — FENTANYL CITRATE 50 UG/ML
INJECTION, SOLUTION INTRAMUSCULAR; INTRAVENOUS PRN
Status: DISCONTINUED | OUTPATIENT
Start: 2019-11-15 | End: 2019-11-18

## 2019-11-15 RX ORDER — SODIUM CHLORIDE, SODIUM LACTATE, POTASSIUM CHLORIDE, CALCIUM CHLORIDE 600; 310; 30; 20 MG/100ML; MG/100ML; MG/100ML; MG/100ML
INJECTION, SOLUTION INTRAVENOUS CONTINUOUS
Status: DISCONTINUED | OUTPATIENT
Start: 2019-11-15 | End: 2019-11-16

## 2019-11-15 RX ORDER — LIDOCAINE HYDROCHLORIDE 10 MG/ML
INJECTION, SOLUTION INFILTRATION; PERINEURAL PRN
Status: DISCONTINUED | OUTPATIENT
Start: 2019-11-15 | End: 2019-11-15 | Stop reason: HOSPADM

## 2019-11-15 RX ORDER — NALOXONE HYDROCHLORIDE 0.4 MG/ML
.1-.4 INJECTION, SOLUTION INTRAMUSCULAR; INTRAVENOUS; SUBCUTANEOUS
Status: ACTIVE | OUTPATIENT
Start: 2019-11-15 | End: 2019-11-16

## 2019-11-15 RX ORDER — DEXTROSE MONOHYDRATE 25 G/50ML
25-50 INJECTION, SOLUTION INTRAVENOUS
Status: DISCONTINUED | OUTPATIENT
Start: 2019-11-15 | End: 2019-11-17 | Stop reason: HOSPADM

## 2019-11-15 RX ORDER — EPHEDRINE SULFATE 50 MG/ML
INJECTION, SOLUTION INTRAMUSCULAR; INTRAVENOUS; SUBCUTANEOUS PRN
Status: DISCONTINUED | OUTPATIENT
Start: 2019-11-15 | End: 2019-11-18

## 2019-11-15 RX ORDER — ONDANSETRON 4 MG/1
4 TABLET, ORALLY DISINTEGRATING ORAL EVERY 30 MIN PRN
Status: DISCONTINUED | OUTPATIENT
Start: 2019-11-15 | End: 2019-11-15 | Stop reason: HOSPADM

## 2019-11-15 RX ORDER — FENTANYL CITRATE 50 UG/ML
25-50 INJECTION, SOLUTION INTRAMUSCULAR; INTRAVENOUS
Status: DISCONTINUED | OUTPATIENT
Start: 2019-11-15 | End: 2019-11-15 | Stop reason: HOSPADM

## 2019-11-15 RX ORDER — CEFAZOLIN SODIUM 1 G/3ML
1 INJECTION, POWDER, FOR SOLUTION INTRAMUSCULAR; INTRAVENOUS SEE ADMIN INSTRUCTIONS
Status: DISCONTINUED | OUTPATIENT
Start: 2019-11-15 | End: 2019-11-15 | Stop reason: CLARIF

## 2019-11-15 RX ORDER — SODIUM CHLORIDE, SODIUM LACTATE, POTASSIUM CHLORIDE, CALCIUM CHLORIDE 600; 310; 30; 20 MG/100ML; MG/100ML; MG/100ML; MG/100ML
INJECTION, SOLUTION INTRAVENOUS CONTINUOUS PRN
Status: DISCONTINUED | OUTPATIENT
Start: 2019-11-15 | End: 2019-11-18

## 2019-11-15 RX ORDER — BUPIVACAINE HYDROCHLORIDE AND EPINEPHRINE 2.5; 5 MG/ML; UG/ML
INJECTION, SOLUTION INFILTRATION; PERINEURAL PRN
Status: DISCONTINUED | OUTPATIENT
Start: 2019-11-15 | End: 2019-11-15 | Stop reason: HOSPADM

## 2019-11-15 RX ORDER — OXYCODONE HYDROCHLORIDE 5 MG/1
5 TABLET ORAL EVERY 4 HOURS PRN
Status: DISCONTINUED | OUTPATIENT
Start: 2019-11-15 | End: 2019-11-17 | Stop reason: HOSPADM

## 2019-11-15 RX ORDER — PROPOFOL 10 MG/ML
INJECTION, EMULSION INTRAVENOUS CONTINUOUS PRN
Status: DISCONTINUED | OUTPATIENT
Start: 2019-11-15 | End: 2019-11-18

## 2019-11-15 RX ORDER — ONDANSETRON 2 MG/ML
INJECTION INTRAMUSCULAR; INTRAVENOUS PRN
Status: DISCONTINUED | OUTPATIENT
Start: 2019-11-15 | End: 2019-11-18

## 2019-11-15 RX ADMIN — Medication 10 MG: at 09:23

## 2019-11-15 RX ADMIN — ROCURONIUM BROMIDE 30 MG: 10 INJECTION INTRAVENOUS at 08:30

## 2019-11-15 RX ADMIN — PHENYLEPHRINE HYDROCHLORIDE 200 MCG: 10 INJECTION INTRAVENOUS at 08:49

## 2019-11-15 RX ADMIN — PHENYLEPHRINE HYDROCHLORIDE 200 MCG: 10 INJECTION INTRAVENOUS at 09:21

## 2019-11-15 RX ADMIN — SUGAMMADEX 200 MG: 100 INJECTION, SOLUTION INTRAVENOUS at 09:54

## 2019-11-15 RX ADMIN — TRIAMCINOLONE ACETONIDE: 1 CREAM TOPICAL at 20:39

## 2019-11-15 RX ADMIN — FUROSEMIDE 40 MG: 10 INJECTION, SOLUTION INTRAVENOUS at 15:05

## 2019-11-15 RX ADMIN — CEFEPIME 2 G: 2 INJECTION, POWDER, FOR SOLUTION INTRAVENOUS at 21:51

## 2019-11-15 RX ADMIN — PROPOFOL 20 MG: 10 INJECTION, EMULSION INTRAVENOUS at 09:50

## 2019-11-15 RX ADMIN — ITRACONAZOLE 200 MG: 100 CAPSULE ORAL at 12:53

## 2019-11-15 RX ADMIN — FENTANYL CITRATE 50 MCG: 50 INJECTION, SOLUTION INTRAMUSCULAR; INTRAVENOUS at 08:23

## 2019-11-15 RX ADMIN — POTASSIUM CHLORIDE 40 MEQ: 750 TABLET, EXTENDED RELEASE ORAL at 22:17

## 2019-11-15 RX ADMIN — PROPOFOL 20 MG: 10 INJECTION, EMULSION INTRAVENOUS at 09:41

## 2019-11-15 RX ADMIN — PHENYLEPHRINE HYDROCHLORIDE 200 MCG: 10 INJECTION INTRAVENOUS at 09:06

## 2019-11-15 RX ADMIN — ONDANSETRON 4 MG: 2 INJECTION INTRAMUSCULAR; INTRAVENOUS at 09:18

## 2019-11-15 RX ADMIN — CELECOXIB 100 MG: 100 CAPSULE ORAL at 18:35

## 2019-11-15 RX ADMIN — CEFEPIME 2 G: 2 INJECTION, POWDER, FOR SOLUTION INTRAVENOUS at 15:08

## 2019-11-15 RX ADMIN — POTASSIUM CHLORIDE 20 MEQ: 750 TABLET, EXTENDED RELEASE ORAL at 15:08

## 2019-11-15 RX ADMIN — PHENYLEPHRINE HYDROCHLORIDE 100 MCG: 10 INJECTION INTRAVENOUS at 08:55

## 2019-11-15 RX ADMIN — POTASSIUM CHLORIDE 20 MEQ: 750 TABLET, EXTENDED RELEASE ORAL at 23:59

## 2019-11-15 RX ADMIN — ACYCLOVIR 800 MG: 800 TABLET ORAL at 12:55

## 2019-11-15 RX ADMIN — CEFAZOLIN SODIUM 2 G: 2 INJECTION, SOLUTION INTRAVENOUS at 08:50

## 2019-11-15 RX ADMIN — POTASSIUM CHLORIDE 40 MEQ: 750 TABLET, EXTENDED RELEASE ORAL at 12:54

## 2019-11-15 RX ADMIN — ELTROMBOPAG OLAMINE 100 MG: 50 TABLET, FILM COATED ORAL at 12:52

## 2019-11-15 RX ADMIN — PAROXETINE HYDROCHLORIDE HEMIHYDRATE 20 MG: 20 TABLET, FILM COATED ORAL at 12:54

## 2019-11-15 RX ADMIN — PROPOFOL 90 MG: 10 INJECTION, EMULSION INTRAVENOUS at 08:30

## 2019-11-15 RX ADMIN — FENTANYL CITRATE 25 MCG: 50 INJECTION, SOLUTION INTRAMUSCULAR; INTRAVENOUS at 09:48

## 2019-11-15 RX ADMIN — ROCURONIUM BROMIDE 10 MG: 10 INJECTION INTRAVENOUS at 08:57

## 2019-11-15 RX ADMIN — MIDAZOLAM 1 MG: 1 INJECTION INTRAMUSCULAR; INTRAVENOUS at 08:21

## 2019-11-15 RX ADMIN — PROPOFOL 50 MCG/KG/MIN: 10 INJECTION, EMULSION INTRAVENOUS at 08:24

## 2019-11-15 RX ADMIN — LIDOCAINE HYDROCHLORIDE 20 MG: 20 INJECTION, SOLUTION INFILTRATION; PERINEURAL at 08:24

## 2019-11-15 RX ADMIN — Medication 10 MG: at 09:38

## 2019-11-15 RX ADMIN — SODIUM CHLORIDE, POTASSIUM CHLORIDE, SODIUM LACTATE AND CALCIUM CHLORIDE: 600; 310; 30; 20 INJECTION, SOLUTION INTRAVENOUS at 08:21

## 2019-11-15 RX ADMIN — PHENYLEPHRINE HYDROCHLORIDE 100 MCG: 10 INJECTION INTRAVENOUS at 09:12

## 2019-11-15 RX ADMIN — FENTANYL CITRATE 50 MCG: 50 INJECTION, SOLUTION INTRAMUSCULAR; INTRAVENOUS at 08:30

## 2019-11-15 RX ADMIN — ITRACONAZOLE 200 MG: 100 CAPSULE ORAL at 18:34

## 2019-11-15 RX ADMIN — ACYCLOVIR 800 MG: 800 TABLET ORAL at 20:39

## 2019-11-15 ASSESSMENT — ACTIVITIES OF DAILY LIVING (ADL)
ADLS_ACUITY_SCORE: 12
ADLS_ACUITY_SCORE: 12
ADLS_ACUITY_SCORE: 11
ADLS_ACUITY_SCORE: 12
ADLS_ACUITY_SCORE: 11

## 2019-11-15 NOTE — ANESTHESIA CARE TRANSFER NOTE
Patient: Angel Yanez    Procedure(s):  Right axillary lymph node biopsy    Diagnosis: Multiple myeloma not having achieved remission (H) [C90.00]  Diagnosis Additional Information: No value filed.    Anesthesia Type:   General     Note:  Airway :Face Mask  Patient transferred to:PACU  Comments: Pt. Breathing spont, VVS, suctioned and extubated.  To PACU.  Report to RN.Handoff Report: Identifed the Patient, Identified the Reponsible Provider, Reviewed the pertinent medical history, Discussed the surgical course, Reviewed Intra-OP anesthesia mangement and issues during anesthesia, Set expectations for post-procedure period and Allowed opportunity for questions and acknowledgement of understanding      Vitals: (Last set prior to Anesthesia Care Transfer)    CRNA VITALS  11/15/2019 0945 - 11/15/2019 1015      11/15/2019             Resp Rate (set):  10                Electronically Signed By: BONITA Padron CRNA  November 15, 2019  10:15 AM

## 2019-11-15 NOTE — PROGRESS NOTES
RHEUMATOLOGY PROGRESS NOTE - Resident     Angel Yanez MRN# [id number]   Age: 59 year old YOB: 1960     Date of Admission:  11/9/2019    Assessment and Plan:   Summary: Angel Yanez is a 58 yo man D+178 s/p 2nd autologous PBSCT (cytoxan/melphalan) transplant for MM. S/p 2 recent readmissions for FUO (10/16-10/23; 9/23-9/29/19). Extensive infectious and cancer w/u has been done so far and all negative. Rheumatology was consulted for FUO.     Lymph node biopsy did not show evidence of HLH. Bone marrow biopsy 11/6 showed no morphologic or immunophenotypic evidence of plasma cell neoplasm. CRP in the 200's and ESR in the 100's. Ferritin 1900, LFTs and TG wnl.     Pt had history of psoriatic arthritis and was on methotrexate in the past. However, no evidence by physical exam or history of present inflammatory illness to support possible autoimmune disease flare with fever. Moreover, BINU, RF, anti CCP, ds DNA all negative. ANCA neg. Sruthi-1 negative. Soluble IL-2RA was elevated at 9,000.      Interval discussion: New maculopapular blanching rash on his trunk. New hypoxic respiratory failure, and CT chest showed new groundglass opacities. Rt axillary lymph node biopsy done today.  Fever, rash, elevated ferritin makes AOSD, HLH on DDx, however, hemophagocytosis was not seen on previous lymph node and bone marrow biopsy. No joints pain, so AOSD is not likely.   Rash was seen with dermatology, likely Miliaria.    Problem list:  FUO  MM s/p autologous PBSCT  anemia, thrombocytopenia post BMT  intermittent neutropenia  Hx of psoriatic arthritis   Soluble IL-2 RA  Maculopapular rash on his trunk  Acute respiratory failure  New groundglass opacities in chest CT     Recommendations:  - Follow lymph node biopsy result to r/o granulomatous disease     The patient was discussed with Dr. Tam.      Yane Gonzalez MD  Internal Medicine PGY-3  [phone]    I saw this patient with the medical resident. I agree with  "the stated findings and recommendations which reflect our joint impressions and plan. Truncal rash is most likely \"heat rash\" and not related to inflammatory process; agree with Dermatology consultation to evaluate.    Jerardo Tam M.D.  Staff Rheumatologist,  Health  Pager [phone]      Subjective/24 hour events:   Has rash. Reported the rash started 1-2 days ago. The rash has spread to his trunk. Itchy. No joints pain.            Medications:     Current Facility-Administered Medications   Medication     acetaminophen (TYLENOL) tablet 325-650 mg     acetaminophen (TYLENOL) tablet 325-650 mg     acyclovir (ZOVIRAX) tablet 800 mg     albuterol (PROVENTIL) neb solution 2.5 mg     ceFEPIme (MAXIPIME) 2 g vial to attach to  ml bag for ADULTS or 50 ml bag for PEDS     celecoxib (celeBREX) capsule 100 mg     glucose gel 15-30 g    Or     dextrose 50 % injection 25-50 mL    Or     glucagon injection 1 mg     itraconazole (SPORANOX) capsule 200 mg     lactated ringers infusion     lidocaine (LMX4) cream     lidocaine 1 % 1 mL     magnesium sulfate 4 g in 100 mL sterile water (premade)     meperidine (DEMEROL) injection 12.5 mg     naloxone (NARCAN) injection 0.1-0.4 mg     oxyCODONE (ROXICODONE) tablet 5 mg     PARoxetine (PAXIL) tablet 20 mg     polyethylene glycol (MIRALAX/GLYCOLAX) Packet 17 g     potassium chloride (KLOR-CON) Packet 20-40 mEq     potassium chloride 10 mEq in 100 mL intermittent infusion with 10 mg lidocaine     potassium chloride 10 mEq in 100 mL sterile water intermittent infusion (premix)     potassium chloride 20 mEq in 50 mL intermittent infusion     potassium chloride ER (K-DUR/KLOR-CON M) CR tablet 20-40 mEq     potassium phosphate 15 mmol in D5W 250 mL intermittent infusion     potassium phosphate 20 mmol in D5W 250 mL intermittent infusion     potassium phosphate 20 mmol in D5W 500 mL intermittent infusion     potassium phosphate 25 mmol in D5W 500 mL intermittent infusion     " prochlorperazine (COMPAZINE) injection 5-10 mg    Or     prochlorperazine (COMPAZINE) tablet 5-10 mg     sodium chloride (PF) 0.9% PF flush 3 mL     sodium chloride (PF) 0.9% PF flush 3 mL     sodium chloride 0.9% infusion     Facility-Administered Medications Ordered in Other Encounters   Medication     ePHEDrine injection     fentaNYL (PF) (SUBLIMAZE) injection     lactated ringers infusion     lidocaine 2% injection (MDV)     midazolam (VERSED) injection     ondansetron (ZOFRAN) injection     phenylephrine (MADDI-SYNEPHRINE) injection     propofol (DIPRIVAN) infusion     propofol (DIPRIVAN) injection 10 mg/mL vial     rocuronium injection     sugammadex (BRIDION) injection            Review of Systems:   Complete 8 point ROS completed and negative unless mentioned in subjective.          Physical Exam:   /74 (BP Location: Right arm)   Pulse 83   Temp 99.3  F (37.4  C) (Axillary)   Resp 18   Wt 80.5 kg (177 lb 7.5 oz)   SpO2 95%   BMI 24.89 kg/m      Constitutional: NAD  HEENT: MMM, EOM intact, sclerae clear and anicteric  Heart: RRR, no murmurs appreciated  Resp: rales at base bilaterally+  Abd: Soft, non-tender, BS+  Extremities: No peripheral edema  Skin: Maculopapular rash, blanching, various sizes 2-10 mm.  MSK: MMT all 5/5, wrist flexion 50 degree, wrist extension 75 degree, other joints ROM full, no joints effusion or sign of synovitis.  Neuro: No focal deficits           Data:   Labs and imaging reviewed.     Yane Gonzalez MD  Internal Medicine PGY-3  [phone]

## 2019-11-15 NOTE — ANESTHESIA PREPROCEDURE EVALUATION
Anesthesia Pre-Procedure Evaluation    Patient: Angel Yanez   MRN:     [id number] Gender:   male   Age:    59 year old :      1960        Preoperative Diagnosis: Multiple myeloma not having achieved remission (H) [C90.00]   Procedure(s):  Right axillary lymph node biopsy, possible left     Past Medical History:   Diagnosis Date     GERD (gastroesophageal reflux disease)      H/O autologous stem cell transplant (H) 2005     Hyperlipidemia      Multiple myeloma (H)      PONV (postoperative nausea and vomiting)      Psoriasis      Psoriatic arthritis (H)       Past Surgical History:   Procedure Laterality Date     ARTHROPLASTY HIP       COLONOSCOPY       HERNIA REPAIR       IR CVC TUNNEL PLACEMENT > 5 YRS OF AGE  2019     IR CVC TUNNEL PLACEMENT > 5 YRS OF AGE  2019     IR CVC TUNNEL REMOVAL LEFT  2019     IR CVC TUNNEL REMOVAL RIGHT  2019     IR FOLLOW UP VISIT OUTPATIENT  2019     IR LYMPH NODE BIOPSY  10/18/2019     ORTHOPEDIC SURGERY       PROCURE BONE MARROW N/A 2019    Procedure: Bone Marrow [place];  Surgeon: Antwan Erickson MD;  Location: [facility] OR     TRANSPLANT            Anesthesia Evaluation     .             ROS/MED HX    ENT/Pulmonary:       Neurologic:       Cardiovascular:         METS/Exercise Tolerance:     Hematologic:     (+) Anemia, Other Hematologic Disorder-mult myeloma stem cell transplants      Musculoskeletal:         GI/Hepatic:         Renal/Genitourinary:         Endo:         Psychiatric:         Infectious Disease:         Malignancy:         Other:    (+) no H/O Chronic Pain,no other significant disability                        PHYSICAL EXAM:   Mental Status/Neuro: A/A/O   Airway: Facies: Feasible  Mallampati: I  Mouth/Opening: Full  TM distance: > 6 cm  Neck ROM: Full   Respiratory: Auscultation: CTAB     Resp. Rate: Normal     Resp. Effort: Normal      CV: Rhythm: Regular  Rate: Age appropriate  Heart: Normal Sounds  Edema: None  "  Comments:      Dental: Normal Dentition                LABS:  CBC:   Lab Results   Component Value Date    WBC 2.2 (L) 11/15/2019    WBC 2.4 (L) 11/14/2019    HGB 8.8 (L) 11/15/2019    HGB 8.0 (L) 11/14/2019    HCT 26.9 (L) 11/15/2019    HCT 24.6 (L) 11/14/2019    PLT 27 (LL) 11/15/2019    PLT 15 (LL) 11/14/2019     BMP:   Lab Results   Component Value Date     11/15/2019     11/14/2019    POTASSIUM 3.2 (L) 11/15/2019    POTASSIUM 3.5 11/14/2019    CHLORIDE 104 11/15/2019    CHLORIDE 103 11/14/2019    CO2 28 11/15/2019    CO2 26 11/14/2019    BUN 20 11/15/2019    BUN 21 11/14/2019    CR 0.71 11/15/2019    CR 0.81 11/14/2019    GLC 89 11/15/2019    GLC 91 11/14/2019     COAGS:   Lab Results   Component Value Date    PTT 49 (H) 11/09/2019    INR 1.24 (H) 11/14/2019     POC:   Lab Results   Component Value Date    BGM 94 05/14/2019     OTHER:   Lab Results   Component Value Date    PH 7.43 01/14/2019    LACT 1.6 11/09/2019    ZHEN 8.1 (L) 11/15/2019    PHOS 3.5 11/06/2019    MAG 2.2 11/06/2019    ALBUMIN 2.6 (L) 11/10/2019    PROTTOTAL 6.0 (L) 11/10/2019    ALT 17 11/10/2019    AST 10 11/10/2019    ALKPHOS 101 11/10/2019    BILITOTAL 0.8 11/10/2019    TSH 0.65 10/29/2019    T4 1.22 02/14/2007    .0 (H) 11/10/2019     (H) 11/10/2019        Preop Vitals    BP Readings from Last 3 Encounters:   11/15/19 (!) 130/91   11/09/19 114/71   11/08/19 115/70    Pulse Readings from Last 3 Encounters:   11/15/19 80   11/09/19 89   11/08/19 98      Resp Readings from Last 3 Encounters:   11/15/19 14   11/09/19 16   11/08/19 16    SpO2 Readings from Last 3 Encounters:   11/15/19 95%   11/09/19 98%   11/08/19 99%      Temp Readings from Last 1 Encounters:   11/15/19 36.7  C (98  F)    Ht Readings from Last 1 Encounters:   10/29/19 1.798 m (5' 10.8\")      Wt Readings from Last 1 Encounters:   11/14/19 80.2 kg (176 lb 12.8 oz)    Estimated body mass index is 24.8 kg/m  as calculated from the following:    " "Height as of 10/29/19: 1.798 m (5' 10.8\").    Weight as of this encounter: 80.2 kg (176 lb 12.8 oz).     LDA:  Peripheral IV 11/10/19 Right;Anterior Lower forearm (Active)   Site Assessment WDL 11/14/2019  7:36 PM   Line Status Saline locked 11/14/2019  7:36 PM   Phlebitis Scale 0-->no symptoms 11/14/2019  7:36 PM   Infiltration Scale 0 11/14/2019  7:36 PM   Extravasation? No 11/14/2019  7:36 PM   Number of days: 5       AVAILABLE (Active)   Number of days:         Assessment:   ASA SCORE: 3            Plan:   Anes. Type:  General                    PONV Management:   Adult Risk Factors:, H/o PONV or Motion Sickness   Prevention: Ondansetron, Dopamine Antagonist     CONSENT: Direct conversation   Plan and risks discussed with: Patient   Blood Products: Consented (ALL Blood Products)       Comments for Plan/Consent:  Mult myeloma  Two past cell transplants  Looks well but fevers, malaise last few months. Uncertain diagnosis.                 Sunday Hager MD  "

## 2019-11-15 NOTE — PLAN OF CARE
/73 (BP Location: Left arm)   Pulse 78   Temp 97.8  F (36.6  C) (Axillary)   Resp 16   Wt 80.2 kg (176 lb 12.8 oz)   SpO2 97%   BMI 24.80 kg/m      VSS, afebrile. No complaints of N/V/D or pain. Pt has a persistent cough that is dry and nonproductive. He is AOx4 and independent. He will have a right axillary biopsy this morning. One unit of platelets will be given before his procedure. Will continue to follow the POC.      Problem: Adult Inpatient Plan of Care  Goal: Plan of Care Review  11/15/2019 0516 by Margarette Hopper, RN  Outcome: No Change  11/14/2019 1800 by Ronel Escobar, RN  Outcome: No Change     Problem: Adult Inpatient Plan of Care  Goal: Optimal Comfort and Wellbeing  11/15/2019 0516 by Margarette Hopper, RN  Outcome: No Change  11/14/2019 1800 by Ronel Escobar, RN  Outcome: No Change

## 2019-11-15 NOTE — PLAN OF CARE
0700 - 1930: Afebrile, VSS. TMAX 100 - tylenol given X1. No c/o pain, nausea, vomiting or diarrhea. Echo completed. 1 unit of platelets given. Up ambulating in halls - less c/o dyspnea today. Walking sats of 96% - MD notified per request. Plan for NPO at 0000 - biopsy tomorrow. To receive 2 units of platelets tomorrow AM. Continue to monitor, following plan of care.         Problem: Adult Inpatient Plan of Care  Goal: Plan of Care Review  11/14/2019 1800 by Ronel Escobar, RN  Outcome: No Change     Problem: Fever (Fever with Neutropenia)  Goal: Baseline Body Temperature  11/14/2019 1800 by Ronel Escobar, RN  Outcome: No Change

## 2019-11-15 NOTE — PLAN OF CARE
/58 (BP Location: Left arm)   Pulse 71   Temp 98  F (36.7  C) (Axillary)   Resp 18   Wt 80.5 kg (177 lb 7.5 oz)   SpO2 96%   BMI 24.89 kg/m  .  2 PIV are patent and saline locked. Patient is A & O x 4. No c/o pain or n/v during this shift. Patient had biopsy on his right axillary lymph, site is C/D/I and no signs of bleeding or infection or pain. Patient desats in to low 80's when he cough, dry cough with mild GRECO. Patient refused for the respiratory monitoring, EtCo2 was 33-20 and IPI is at 3-4.  Patient is room air now and just SpO2 monitoring, O2 is upper 92-97% room air when not coughing or talking. Patient received potassium 40 meq po x 1 and at 1500 will received potassium 20 meq po. Potassium recheck will be 8 pm, by VPT. Patient is eating and drinking good. Continue with plan of care and notify MD for status changes.     Problem: Adult Inpatient Plan of Care  Goal: Plan of Care Review  11/15/2019 1440 by Catie Morales RN  Outcome: No Change  Flowsheets (Taken 11/15/2019 1440)  Plan of Care Reviewed With: patient     Problem: Adult Inpatient Plan of Care  Goal: Patient-Specific Goal (Individualization)  11/15/2019 1440 by Catie Morales RN  Outcome: No Change     Problem: Adult Inpatient Plan of Care  Goal: Absence of Hospital-Acquired Illness or Injury  11/15/2019 1440 by Catie Morales RN  Outcome: No Change     Problem: Adult Inpatient Plan of Care  Goal: Optimal Comfort and Wellbeing  11/15/2019 1440 by Catie Morales RN  Outcome: No Change     Problem: Adult Inpatient Plan of Care  Goal: Readiness for Transition of Care  11/15/2019 1440 by Catie Morales RN  Outcome: No Change     Problem: Adult Inpatient Plan of Care  Goal: Rounds/Family Conference  11/15/2019 1440 by Catie Morales RN  Outcome: No Change     Problem: Fever (Fever with Neutropenia)  Goal: Baseline Body Temperature  11/15/2019 1440 by Catie Morales RN  Outcome:  No Change     Problem: Infection Risk (Fever with Neutropenia)  Goal: Absence of Infection  11/15/2019 1440 by Catie Morales RN  Outcome: No Change

## 2019-11-15 NOTE — OR NURSING
Infection control called regarding whether patient needed to be in negative airflow room due to WBC. Nba Coon from infection control called regarding the isolation status of this patient. She stated he did not have to be in an Positive airflow room at this time, but nurses caring for him should wear a mask during patient care.

## 2019-11-15 NOTE — CONSULTS
Physician Attestation   I, Maciel Stephens MD, saw this patient with the resident and agree with the resident/fellow's findings and plan of care as documented in the note.      I personally reviewed vital signs, medications and labs.    Key findings: Diffuse morbilliform eruption on the trunk in immunosuppressed patient with FUO (though afebrile last >24 hrs on broad-spectrum antimicrobials). Broad differential including drug rash and viral exanthem are strongly consider, or potentially more common etiologies such as miliaria rubra though folliculocentricity is difficult to appreciate and no pustules/collarettes of scale. Given this differential, a punch biopsy was performed.    Staff Physician Comments:   I saw and evaluated the patient with the resident and I edited the assessment and plan as documented in the note. I was present for the examination but I was not physically present for the punch biopsy.    Maciel Stephens MD   of Dermatology  Department of Dermatology  [facility] [employer]  Date of Service (when I saw the patient): 11/15/19    [facility] Inpatient Consult Dermatology Note    Impression/Plan:  1. Pink blanchable papular eruption on the trunk- Differential is broad in this immunosuppressed patient. Favor miliaria rubra (heat rash, due to fevers and sweating) vs early morbilliform drug eruption or viral exanthem. If this is a drug rash, would likely be a simple morbilliform eruption over DRESS, as patient with no transaminitis, renal impairment, eosinophilia, nor facial rash/edema. No pustules to suggest folliculitis. Transient acantholytic dermatitis less likely given acuity. Disseminated HSV/VZV could be considered given immunosuppression, but no lesions to swab, though biopsy may be helpful to exclude. Also less likely as patient is on suppressive acyclovir dosing. Acute GVHD unlikely given autologous transplant and timing. Patient  got GCSF recently, which may be a cause of Sweet's syndrome (which can cause fevers) but doubt that because the clinical presentation is not classic of Sweet's. Myeloma infiltration of the skin less likely, and rash also does not resemble Adult-onset Still's disease nor sarcoidosis. Given differential, ongoing fevers, and immunosuppression, performed a biopsy for H&E.    Punch biopsy: After discussion of benefits and risks including but not limited to bleeding, infection, scar, incomplete removal, recurrence, and non-diagnostic biopsy, written consent and photographs were obtained. Time-out was performed. The area was cleaned with isopropyl alcohol. 2 mL of 1% lidocaine with 1:100,000 epinephrine was injected to obtain adequate anesthesia of the lesion on the right mid back. A 4 mm punch biopsy was performed.  4-0 prolene sutures were utilized to approximate the epidermal edges.  White petroleum jelly/VaselineTM and a bandage was applied to the wound.  Explicit verbal wound care instructions were provided.  Recommend suture removal in 14 days.     Start TAC 0.1% cream BID (avoid ointment because it would be more occlusive, if this in fact miliaria rubra)    Recommend cooling maneuvers such as fan    Daily CBC w/ diff and CMP    Thank you for the dermatology consultation. Please do not hesitate to contact the dermatology resident/faculty on call for any additional questions or concerns. We will continue to follow.     Fozia Trotter MD  Dermatology Resident      Dermatology Problem List:  1. Pink blanchable papular eruption on the trunk    Date of Admission: Nov 9, 2019   Encounter Date: 11/15/2019     Reason for Consultation:   Rash    History of Present Illness:  Mr. Angel Yanez is a 59 year old male with multiple myelona s/p 2nd autologous PBSCT, with 2 recent admissions for fever of unknown origin (FUO) who is readmiited with FUO. Dermatology was consulted for a new rash that developed on the trunk  about 3 days ago.  The patient is being followed by the Infectious disease, Rheumatology, and Heme/Onc teams. The patient has had extensive FUO workup. Infectious etiologies have so far been unremarkable. Patient found to have hyperferritenemia of about 1900 and elevated soluble IL2 receptor. Has elevated ESR/CRP. Patient is pancytopenic. He is being worked up for sarcoidosis. He also had excisional lymph node biopsy of R axillae today and ID plans to send next generation PCR workup on this. Patient has had bone marrow biopsies, which were negative for plasma cell neoplasm or HLH.  The patient has been on multiple medications given his recent admission. Here he is on cefepime, prophylactic acyclovir, itraconazole, Lasix, celecoxib, and paroxetine.  After rash was noticed about 3 days ago, Heme/Onc team held promacta, as there was a concern about a drug rash.  The patient denies that the rash involved the face or extremities. It is not bothersome to him. It is not pruritic.  No peripheral eosinophilia, elevated creatinine or hepatic function abnormalities. Feels that he has been sweating a lot.    Past Medical History:   Patient Active Problem List   Diagnosis     Psoriasis with arthropathy (H)     Hypogammaglobulinemia (H)     Multiple myeloma not having achieved remission (H)     Lymphadenopathy     EBV (Muna-Barr virus) viremia     Fever in adult     Recurrent fever     Past Medical History:   Diagnosis Date     GERD (gastroesophageal reflux disease)      H/O autologous stem cell transplant (H) 02/2005     Hyperlipidemia      Multiple myeloma (H) 2004     PONV (postoperative nausea and vomiting)      Psoriasis      Psoriatic arthritis (H)      Past Surgical History:   Procedure Laterality Date     ARTHROPLASTY HIP       COLONOSCOPY       HERNIA REPAIR       IR CVC TUNNEL PLACEMENT > 5 YRS OF AGE  1/22/2019     IR CVC TUNNEL PLACEMENT > 5 YRS OF AGE  5/16/2019     IR CVC TUNNEL REMOVAL LEFT  2/20/2019     IR CVC  TUNNEL REMOVAL RIGHT  7/23/2019     IR FOLLOW UP VISIT OUTPATIENT  1/24/2019     IR LYMPH NODE BIOPSY  10/18/2019     ORTHOPEDIC SURGERY       PROCURE BONE MARROW N/A 5/14/2019    Procedure: Bone Marrow [place];  Surgeon: Antwan Erickson MD;  Location: [facility] OR     TRANSPLANT           Social History:  Patient reports that he has quit smoking. He has never used smokeless tobacco. He reports current alcohol use. He reports that he does not use drugs.    Family History:  Family History   Problem Relation Age of Onset     Diabetes Mother      Cerebrovascular Disease Mother      Leukemia Father        Medications:  Current Facility-Administered Medications   Medication     acetaminophen (TYLENOL) tablet 325-650 mg     acetaminophen (TYLENOL) tablet 325-650 mg     acyclovir (ZOVIRAX) tablet 800 mg     albuterol (PROVENTIL) neb solution 2.5 mg     ceFEPIme (MAXIPIME) 2 g vial to attach to  ml bag for ADULTS or 50 ml bag for PEDS     celecoxib (celeBREX) capsule 100 mg     glucose gel 15-30 g    Or     dextrose 50 % injection 25-50 mL    Or     glucagon injection 1 mg     itraconazole (SPORANOX) capsule 200 mg     lactated ringers infusion     lidocaine (LMX4) cream     lidocaine 1 % 1 mL     magnesium sulfate 4 g in 100 mL sterile water (premade)     meperidine (DEMEROL) injection 12.5 mg     naloxone (NARCAN) injection 0.1-0.4 mg     oxyCODONE (ROXICODONE) tablet 5 mg     PARoxetine (PAXIL) tablet 20 mg     polyethylene glycol (MIRALAX/GLYCOLAX) Packet 17 g     potassium chloride (KLOR-CON) Packet 20-40 mEq     potassium chloride 10 mEq in 100 mL intermittent infusion with 10 mg lidocaine     potassium chloride 10 mEq in 100 mL sterile water intermittent infusion (premix)     potassium chloride 20 mEq in 50 mL intermittent infusion     potassium chloride ER (K-DUR/KLOR-CON M) CR tablet 20-40 mEq     potassium phosphate 15 mmol in D5W 250 mL intermittent infusion     potassium phosphate 20 mmol in D5W 250 mL  intermittent infusion     potassium phosphate 20 mmol in D5W 500 mL intermittent infusion     potassium phosphate 25 mmol in D5W 500 mL intermittent infusion     prochlorperazine (COMPAZINE) injection 5-10 mg    Or     prochlorperazine (COMPAZINE) tablet 5-10 mg     sodium chloride (PF) 0.9% PF flush 3 mL     sodium chloride (PF) 0.9% PF flush 3 mL     sodium chloride 0.9% infusion     Facility-Administered Medications Ordered in Other Encounters   Medication     ePHEDrine injection     fentaNYL (PF) (SUBLIMAZE) injection     lactated ringers infusion     lidocaine 2% injection (MDV)     midazolam (VERSED) injection     ondansetron (ZOFRAN) injection     phenylephrine (MADDI-SYNEPHRINE) injection     propofol (DIPRIVAN) infusion     propofol (DIPRIVAN) injection 10 mg/mL vial     rocuronium injection     sugammadex (BRIDION) injection          Allergies   Allergen Reactions     Avalide Hives     Chlorhexidine Itching     Chloroxylenol Rash     Technicare solution     Lorazepam Hives     Moxifloxacin Hives         Review of Systems:  -As per HPI      Physical exam:  Vitals: /74 (BP Location: Right arm)   Pulse 83   Temp 99.3  F (37.4  C) (Axillary)   Resp 18   Wt 80.5 kg (177 lb 7.5 oz)   SpO2 95%   BMI 24.89 kg/m    GEN: This is a well developed, well-nourished male in no acute distress, in a pleasant mood.    SKIN: Total skin excluding the undergarment areas was performed. The exam included the head/face, neck, both arms, chest, back, abdomen, both legs, digits and/or nails.   -Jacob skin type: I  -On the trunk there are multiple pink blanchable papules and macules, worse on the back, than the chest/abdomen. Appear more follicularly based on the back than they do on the abdomen.  -No facial involvement. Extremities are spared.  -No other lesions of concern on areas examined.     Laboratory:  Results for orders placed or performed during the hospital encounter of 11/09/19 (from the past 24 hour(s))    Platelets prepare order unit   Result Value Ref Range    Blood Component Type PLT Pheresis     Units Ordered 2    Blood component   Result Value Ref Range    Unit Number [id number]     Blood Component Type PlateletPheresis,LeukoRed Irrad (Part 2)     Division Number 00     Status of Unit Released to care unit 11/15/2019 0650     Blood Product Code [id number]     Unit Status ISS    CBC with platelets differential   Result Value Ref Range    WBC 2.2 (L) 4.0 - 11.0 10e9/L    RBC Count 2.78 (L) 4.4 - 5.9 10e12/L    Hemoglobin 8.8 (L) 13.3 - 17.7 g/dL    Hematocrit 26.9 (L) 40.0 - 53.0 %    MCV 97 78 - 100 fl    MCH 31.7 26.5 - 33.0 pg    MCHC 32.7 31.5 - 36.5 g/dL    RDW 21.8 (H) 10.0 - 15.0 %    Platelet Count 27 (LL) 150 - 450 10e9/L    Diff Method Manual Differential     % Neutrophils 60.4 %    % Lymphocytes 18.9 %    % Monocytes 1.8 %    % Eosinophils 18.9 %    % Basophils 0.0 %    Absolute Neutrophil 1.3 (L) 1.6 - 8.3 10e9/L    Absolute Lymphocytes 0.4 (L) 0.8 - 5.3 10e9/L    Absolute Monocytes 0.0 0.0 - 1.3 10e9/L    Absolute Eosinophils 0.4 0.0 - 0.7 10e9/L    Absolute Basophils 0.0 0.0 - 0.2 10e9/L    Anisocytosis Moderate     Poikilocytosis Slight     Polychromasia Marked     Ovalocytes Slight     Microcytes Present    Basic metabolic panel   Result Value Ref Range    Sodium 138 133 - 144 mmol/L    Potassium 3.2 (L) 3.4 - 5.3 mmol/L    Chloride 104 94 - 109 mmol/L    Carbon Dioxide 28 20 - 32 mmol/L    Anion Gap 6 3 - 14 mmol/L    Glucose 89 70 - 99 mg/dL    Urea Nitrogen 20 7 - 30 mg/dL    Creatinine 0.71 0.66 - 1.25 mg/dL    GFR Estimate >90 >60 mL/min/[1.73_m2]    GFR Estimate If Black >90 >60 mL/min/[1.73_m2]    Calcium 8.1 (L) 8.5 - 10.1 mg/dL   ABO/Rh type and screen   Result Value Ref Range    ABO O     RH(D) Pos     Antibody Screen Neg     Test Valid Only At          [facility]    Specimen Expires 11/18/2019    Platelets prepare order unit conditional    Result Value Ref Range    Blood Component Type PLT Pheresis     Units Ordered 1    Blood component   Result Value Ref Range    Unit Number [id number]     Blood Component Type PlateletPheresis LeukoReduced Irradiated     Division Number 00     Status of Unit Released to care unit 11/15/2019 0527     Blood Product Code [id number]     Unit Status ISS    Glucose by meter   Result Value Ref Range    Glucose 97 70 - 99 mg/dL   Leukemia Lymphoma Evaluation Non CSF   Result Value Ref Range    Copath Report       Patient Name: SHIMON KNIGHT  MR#: [id number]  Specimen #: [id number]  Collected: 11/15/2019 09:30  Received: 11/15/2019 11:22  Reported: 11/15/2019 15:16  Ordering Phy(s): KEN FERNANDEZ    For improved result formatting, select 'View Enhanced Report Format' under   Linked Documents section.  _________________________________________    SPECIMEN(S):  Lymph node, right axillary    INTERPRETATION:  Lymph node, right axillary:       Polytypic plasma cells    COMMENT:  There is no immunophenotypic evidence of a plasma cell neoplasm.    RESULTS:  Percentages reported below are based on the total number of CD45 positive   viable leukocytes. If applicable,  percentage of plasma cells is from total viable nucleated cells.  0.5% polytypic B cells    0.1% polytypic plasma cells    The plasma cell percentage by flow cytometry is expected to be less than   by morphology due to specimen  processing.    ANTIBODIES:  Eight color analyses are performed for the following markers: CD19, CD20,   CD38,  CD45, CD56, , cytoplasmic  p63, and cytoplasmic kappa and lambda immunoglobulin light chains. Cells   are gated to isolate populations  (CD45 versus side scatter and forward scatter versus side scatter), to   exclude debris (forward scatter versus  side scatter) and to exclude cell doublets (forward scatter height versus   forward scatter width and side  scatter height versus side scatter width). Forward scatter varies with    cell size. Side scatter varies with the  amount of cytoplasmic granules. Intensity for CD45 usually increases as   hematolymphoid cells mature. The  analytic sensitivity of this assay to detect monotypic plasma cells as   rare flow events is 0.01%.    CLINICAL HISTORY:  59 year old male with history of myeloma    I have personally reviewed all specimens and/or slides, including the   listed special stains, and used them  with my medical judgment to determine the final diagnosis.    Electronically signed out by:    Kai Hamilton M.D.,[facility]    This  test was developed and its performance characteristics determined by   [facility]. It has not been cleared or   approved by the US Food and Drug  Administration.  FDA does not require this test to go through premarket   FDA review. This test is used for  clinical purposes and should not be regarded as investigational or for   research.  This laboratory is certified  under the Clinical Laboratory Improvement Amendments (CLIA) as qualified   to perform high complexity clinical  laboratory testing.    CPT Codes:  A: 00559-RC, 21010-VHDJFHJ, 64712-98-KMWD96(6), 42887-81-BVVH94,   88188-IFPF9-15    TESTING LAB LOCATION:  [address] [address] [phone]    COLLECTION SITE:  Client:  [facility]  [facility]     CBC with platelets differential   Result Value Ref Range    WBC 2.1 (L) 4.0 - 11.0 10e9/L    RBC Count 2.85 (L) 4.4 - 5.9 10e12/L    Hemoglobin 9.0 (L) 13.3 - 17.7 g/dL    Hematocrit 28.0 (L) 40.0 - 53.0 %    MCV 98 78 - 100 fl    MCH 31.6 26.5 - 33.0 pg    MCHC 32.1 31.5 - 36.5 g/dL    RDW 22.1 (H) 10.0 - 15.0 %    Platelet Count 57 (L) 150 - 450 10e9/L    Diff Method Manual Differential     % Neutrophils 59.6 %    % Lymphocytes 18.4 %    % Monocytes 5.3 %     % Eosinophils 16.7 %    % Basophils 0.0 %    Absolute Neutrophil 1.3 (L) 1.6 - 8.3 10e9/L    Absolute Lymphocytes 0.4 (L) 0.8 - 5.3 10e9/L    Absolute Monocytes 0.1 0.0 - 1.3 10e9/L    Absolute Eosinophils 0.4 0.0 - 0.7 10e9/L    Absolute Basophils 0.0 0.0 - 0.2 10e9/L    Anisocytosis Moderate     Poikilocytosis Slight     Polychromasia Slight     Platelet Estimate Confirming automated cell count    INR   Result Value Ref Range    INR 1.17 (H) 0.86 - 1.14   CT Chest w/o Contrast    Narrative    EXAM:  CT CHEST W/O CONTRAST . 11/15/2019 2:45 PM     TECHNIQUE:  Helical CT images from the thoracic inlet through the  upper abdomen were obtained without intravenous contrast.    COMPARISON: CT 11/9/2019    PROVIDED HISTORY: new hypoxia, worsening cough, FUO    ADDITIONAL HISTORY FROM THE EMR: Status post right axillary jami  dissection today 11/15/2019    FINDINGS:  CHEST:  LUNGS: The central tracheal bronchial tree is patent. Small bilateral  pleural effusions, slightly greater on the right. Dependent  attenuation suggesting atelectasis. New groundglass attenuation in the  apices (series 3, image 10, 13). Interlobular septal thickening,  greatest in the subpleural spaces and lung bases.    MEDIASTINUM/AXILLAE: Heart size is within normal limits. No  pericardial effusion. Postsurgical changes of right axillary jami  dissection, with expected soft tissue emphysema and edema. No focal  fluid collection in the right axilla. Unchanged mildly prominent left  axillary lymph nodes. Calcified right hilar and mediastinal lymph  nodes. Multiple mildly prominent noncalcified lymph nodes as well, for  example 1.2 cm right paratracheal lymph node (series 2, image 21) and  1.6 cm subcarinal lymph node (series 2, image 27), similar to mildly  decreased from prior. Visualized thyroid is unremarkable.    UPPER ABDOMEN: Scattered splenic calcifications suggesting calcified  granulomata.    BONES: Generalized osteopenia. Unchanged  multilevel compression  deformities throughout the thoracic and visualized lumbar spine  involving T5, and T7 extending into the lumbar spine. This is most  severe at L1 with approximately 60% central height loss.      Impression    IMPRESSION:   1. New groundglass opacities greatest in the upper lobes concerning  for infection.  2. Increased prominence of the interstitium suggesting interstitial  edema and/or component of infection.  3. Small bilateral pleural effusions.  4. Mildly prominent mediastinal lymph nodes are similar to slightly  decreased from 11/9/2019. Post surgical changes of right axillary  jami dissection.    I have personally reviewed the examination and initial interpretation  and I agree with the findings.    MD Dr. Angelo EDMONDSON staffed the patient.    Staff Involved:  Resident/Staff

## 2019-11-15 NOTE — BRIEF OP NOTE
[facility], [place]    Brief Operative Note    Pre-operative diagnosis: Multiple myeloma not having achieved remission (H) [C90.00]  Post-operative diagnosis Same as pre-operative diagnosis    Procedure: Procedure(s):  Right axillary lymph node biopsy  Surgeon: Surgeon(s) and Role:     * Reese Irving MD - Primary     * Ramesh Aguirre MD - Resident - Assisting     * Eloy Sullivan MD - Resident - Assisting  Anesthesia: General   Estimated blood loss: 2 mL  Drains: None  Specimens:   ID Type Source Tests Collected by Time Destination   A : right axillary lymph node biopsy Tissue Lymph Node SURGICAL PATHOLOGY EXAM Reese Irving MD 11/15/2019  9:14 AM      Findings:   2 right axillary lymph nodes sent to pathology.  Complications: None.  Implants: * No implants in log *

## 2019-11-15 NOTE — OR NURSING
Patient placed wedding ring back on his finger. Medical band remains secure in chart with patient's name label on the case that the band is placed in.

## 2019-11-15 NOTE — PROGRESS NOTES
BMT Progress Note    ID: Angel Yanez is a 60 yo man D+182 s/p 2nd autologous transplant for MM. S/p 2 recent readmissions for FUO (10/16-10/23; 9/23-9/29/19).     HPI:  Had lymph node biopsy today- biopsy site right axilla- no bleeding, really no pain. Gets gets dyspneic fairly easy.   Addendum: Hypoxic, sats in the 90's on 2 liters NC.  Sats drop on room air.  Increasing Dry cough. Pruj=785.  Worsening rash per Dr Godwin.  Will hold promacta    ROS: Negative except as stated above.    Physical Exam  /58 (BP Location: Left arm)   Pulse 71   Temp 98  F (36.7  C) (Axillary)   Resp 18   Wt 80.5 kg (177 lb 7.5 oz)   SpO2 98%   BMI 24.89 kg/m       Constitutional:  SOB noted with speaking today.   HEENT: MMM, EOM intact, sclerae clear and anicteric  Heart: Not tachy today, RRR, no murmurs appreciated  Resp:  +bibasilar lung field crackles stable.  Abd: Soft, non-tender, BS+  Lymph: No peripheral edema  Skin: +erythematous maculopapular rash noted on back. +somewhat acne like. Right axilla incision without signs of infection or bleeding this morning.  Neuro: No focal deficits  Peripheral IV x2, one in each wrist    Labs  Lab Results   Component Value Date    WBC 2.2 (L) 11/15/2019    ANEU 1.3 (L) 11/15/2019    HGB 8.8 (L) 11/15/2019    HCT 26.9 (L) 11/15/2019    PLT 27 (LL) 11/15/2019     11/15/2019    POTASSIUM 3.2 (L) 11/15/2019    CHLORIDE 104 11/15/2019    CO2 28 11/15/2019    GLC 89 11/15/2019    BUN 20 11/15/2019    CR 0.71 11/15/2019    MAG 2.2 11/06/2019    INR 1.24 (H) 11/14/2019    BILITOTAL 0.8 11/10/2019    AST 10 11/10/2019    ALT 17 11/10/2019    ALKPHOS 101 11/10/2019    PROTTOTAL 6.0 (L) 11/10/2019    ALBUMIN 2.6 (L) 11/10/2019     A/P:  Angel Yanez is a 60 yo man D+182 s/p 2nd autologous transplant for MM. S/p 2 recent readmissions for FUO (10/16-10/23; 9/23-9/29/19).      1.  BMT/MM:   Slow engraftment; cell dose was only 0.639x10^6. ([facility] [place] - known poor cell dose as  failed chemo-mobilization 1/2019.)   - [facility] CR.  - day +180 bone marrow biopsy 11/6 which showed no morphologic or immunophenotypic evidence of plasma cell neoplasm.   - PET in 8/2019 clear.      2.  HEME: Keep Hgb>8g/dL, plt >20 (epistaxis requiring rhinorocket packing in last admission).   - anemic, thrombocytopenic post BMT, low cell dose, +/- infection.   - intermittent neutropenia: GCSF last on 11/6; give prn ANC </=1000. No G-CSF on discharge, ANC=1.3  - No evidence of HLH on lymphnode bx 10/18/19.     - Persistent thrombocytopenia: BM 11/6/19 showed adequate granulopoiesis and erythropoiesis but his platelet precursors were 0 to absent which is reflected in peripheral CBC. Started promactic 50mcg daily on 11/10. Increased to 100 mg on 11/14 and pending results go to max dose in a few days.  Addendum: hold promacta, 5% chance of causing rash.  - Hemolysis eval neg.   - prolonged INR. Improved with Vit K.   - Received 2 bags of plts today for LN biopsy, surgery wanted him >30K and he has not been getting >30K with just one bag.        3.  ID: FUO: tmax 100.0, continues on scheduled celebrex.  A.) Extensive ID work up unremarkable x 2 sept and oct. 10/16 and 11/9 repeat chest CT: persistent mediastinal adenopathy no paranchymal disease. S/P bx of L axillary node on 10/18: No evidence of HLH, tissues looks reactive.   - ID consulted 11/10, appreciate recs. Extensive ID testing negative but also lots still pending. Following cx results daily.   - Consulted general surgery for excisional lymph node bx (neg core bx 10/18 L axillary). Would like R axillary node (please send for pathology, leukemia/lymphoma, Gram stain, aerobic cx, anaerobic cx, fungal cx, AFB smear and cx, and send out to [facility] for next generation bacterial, mycobacterial, fungal PCR.   -Received prophy Ancef pre-op.  - HTLV neg with very low lymphocyte count. CD4 count 146.   - Per ID with negative w/u and not neutropenic  discontinued cefepime 11/12.  - Per ID, possible histoplasmosis: empiric itraconazole 200 TID started last admission with fevers initially resolved but now recurred; currently on bid dosing. Pt declining recommened ID follow-up in December. 10/18 Histo/blasto urine antigen negative. Per Dr Kelley flucaonzole can suppress growth? 10/18 [place] DNA test- negative. 1/12 itra level=1.6.    - prophy ACV, Pentamidine (last 10/17/19)Plan on giving pentamidine in clinic on 11/19/2019    B.) Autoimmune workup so far negative. Rheum consult, last note on 11/14: feels no evidence of autoimmune disease flair neg studies above but recommended evaluate possible granulomatous finding with lymph nose biopsy to r/o sarcoidosis in the setting of FUO and elevated soluble IL-2 RA    C.) Malignancy W/U: Neg BM BX 11/6. Neg PET in 8/2019. Neg core lymph node needle bx 10/18. PSA neg. Admit CT with R kidney hypoenhancing ill-defined focus. MRI 11/13 not suggestive of urothelial or renal cell carcinoma.       4. Pulm:   - tachypnea/SOB: mainly with fevers. On RA. Imaging w/ contrast neg. Crackles and fluid up.   - 10/17: Echo with preserved EF, no evidence of right heart strain. Repeat echo 11/14 stable.  - 10/17 VQ scan neg for PE.     5. GI:    - 11/10/2019:LFTs are wnl     5.  FEN/Renal:   Will repeat lasix again today 40mg IV x1  - Lyte replacement per scheduled protocol.     -hypokalemia: K=3.2, replace per sliding scale.  Will recheck K at 20:00 due to lasix     6.  Mood: Continue Paxil. Told colleague yesterday: having lots of regret about stem cell transplant with low stem cell dose.  JANELL discussed this with patient, see note on 11/14/2019    7. Derm:   - Rash noted on back and now chest. Non-bothersome. Rash worse today after discussion with Dr Godwin.  DDX:  Likely drug, will hold promacta as can cause rash 5% of the time.  Give some hydrocortisone cream  8.  Pulmonary:  New hypoxia as above, worsening cough.  DDX:  After  anesthesia, vs fluid up with now pleural effusion vs infection-get chest CT without contrast, add prophy cefepime, diuresis as above. 11/13 rvp=NEG    Disposition:   Excisional lymph node bx done this am, 11/15/2019.  Results pending. Dr Godwin states he wants to discharge Guille today, 11/15. Dr Godwin is going to discuss with Guille when to call if fevers returns.  Guille received 2 bags of platelets today, 11/15 for plt=27 prior to biopsy.  Feel like he is okay to go until Tuesday before being seen.  Guille agrees to call with issues including bleeding.  Possible RBC and or platelets on Tuesday.  Guille knows type and screen expires on 11/18/2019 and if he needs RBC will have to come back for these.    ADDENDUM:  Will not discharge today with new hypoxia and worsening cough.  Hold promacta due to rash.    Funmi Ospina PA-C  0411    The patient was seen and examined by me separately from the midlevel provider. The note reflects our mutual assessment and plans and were approved by me personally.   I personally reviewed today's lab results vital signs and radiology results.    Each point of the assessment and plan were reviewed by the midlevel and me and either endorsed by me or were my added decisions.    My pertinent physical findings today are: Febrile, clear lungs and rash on shoulders.anf upper trunk    My assessment and plan are: 60 yo 180 day post second auto PBSC for myeloma admitted for w/u FUO. CT CAP -. On itraconozole for possible  Histoplasmosis. Do connective tissue disease autoimmune w/u (R/O rheumatoid arthritis, SLE ans also Lyme disease.) Consult ID. Schedule celebrex for comfort. Consult rheumatology. Increase celebres to 100 mg po BID. Check PSA. Appreciate  ID and rheum recommendation for extensive w/u: will implement. Lymph node biopsy result pending. MRI kidney:small mass not suggestive of cancer. CXR, check RVP.   Cardia echo for new pleural effusions. Desating: repeat lung CT. Hold promacta for  kiley.    Stephan Godwin M.D.

## 2019-11-15 NOTE — PROGRESS NOTES
Prior Authorization Approval    promacta 50mg tabs  Date Initiated: 11/14/2019  Date Completed: 11/14/2019  Prior Auth Type: Clinical                Status: Approved    Effective Date: 10/14/2019 - 05/14/2020  Copay: 0.00     Filling Pharmacy: [facility] - [place], [place] - [address] [place] [address]    Insurance: [employer] - Phone [phone] Fax [phone]  ID: [id number]  Case Number: [id number]   Submitted Via: Dulce Jenkins  [facility] Liaison  Ph: [phone] Page: [phone]

## 2019-11-15 NOTE — PROGRESS NOTES
[facility]    Transplant Infectious Diseases Inpatient Progress note      Angel Yanez MRN# [id number]   YOB: 1960 Age: 59 year old   Date of Admission: 11/9/2019  1:25 PM             Recommendations:   1. Will follow on LN pathology and microbiology, also on the next generation bacterial, mycobacterial, fungal PCR on LN biopsy from 10/18/2019, and all the rest of the pending labs.   2. Check pulse ox on exertion.     I am on call over the weekend.     Addendum:  I was called by on call Hem/Onc regarding abnormal CT chest.   I recommended induced sputum for PJP PCR, if sputum is sufficient then can send for AFB smear and cx, fungal cx, Gram stain and cx.   Pulse ox on exertion, and BD-glucan.         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         Active Problems and Infectious Diseases Issues:   1. FUO with LA.  Extensive infectious workup has been negative. Now s/p right axillary LN excisional biopsy.   The positive C pneumoniae at very low level of 1:64 represents prior exposure.     It is possible that smoldering fungal infectious process not responding to itraconazole due to resistance, we are going to follow up on lymph node biopsy results.   Failure due to subtherapeutic itraconazole levels is less likely given itraconazole level of 0.9, hydroxyitraconazole of 1.2, total of 2.1 which is therapeutic.     2. Torso rash.   Looks like folliculitis.     3. Cough with GGO on CT  CT has not been read yet.   With pleural effusions and GGO, I think it's more c/w edema. Will wait for the official CT chest reading.     Check pulse ox on exertion.         Old Problems and Infectious Diseases Issues:   1. P aeruginosa BSI due to line infection treated with cefepime and removal of the line.     Other Infectious Disease issues include:  - on itraconazole starting 10/18/2019.   - PCP  prophylaxis: pentamidine, last dose 10/17/2019.   - Serostatus: CMV+, EBV+, HSV1-/2+, VZV ?      Attestation:  I interviewed the patient and obtained history from the patient, and by reviewing the patient's chart including outside records, microbiological data, and radiological data. All data are summarized in this notes.  Collin Albarado MD   Pager: [phone]  11/15/2019      Interim History and Events:   Was started on eltrombopag for possible ITP 11/11/2019.   Looks more comfortable than yesterday. Fever curve has improved with higher dose of celebrex.   Still with fever however.   With mild non-productive cough.       ROS:  As mentioned in the interim history otherwise negative by reviewing constitutional symptoms, central and peripheral neurological systems, respiratory system, cardiac system, GI system,  system, musculoskeletal, skin, allergy, and lymphatics.                 Pysical Examination:  Temp: 98  F (36.7  C) Temp src: Axillary BP: 115/58 Pulse: 71 Heart Rate: 71 Resp: 18 SpO2: 96 % O2 Device: None (Room air) Oxygen Delivery: 4 LPM  Vitals:    11/11/19 0827 11/12/19 0854 11/13/19 0800 11/14/19 0810   Weight: 79.2 kg (174 lb 11.2 oz) 81.4 kg (179 lb 7.3 oz) 81.1 kg (178 lb 14.4 oz) 80.2 kg (176 lb 12.8 oz)    11/15/19 1056   Weight: 80.5 kg (177 lb 7.5 oz)     Constitutional: awake, alert, cooperative, appears at stated age, well nourished.   Skin: erythematous base pustules on the torso.   Chest: crackles bilaterally, no accessory muscle use.       Medications:  Medications that Require Transfusion:     lactated ringers       sodium chloride 10 mL/hr at 11/11/19 0942     Scheduled Medications:     acyclovir  800 mg Oral BID     ceFEPIme (MAXIPIME) IV  2 g Intravenous Q8H     celecoxib  100 mg Oral BID     itraconazole  200 mg Oral BID     PARoxetine  20 mg Oral QAM     sodium chloride (PF)  3 mL Intracatheter Q8H       Laboratory Data:   Absolute CD4   Date Value Ref Range Status   11/12/2019 146  (L) 441 - 2,156 cells/uL Final       Inflammatory Markers    Recent Labs   Lab Test 11/10/19  0413 09/25/19  1022   *  --    .0* 160.0*       Immune Globulin Studies     Recent Labs   Lab Test 11/13/19  0440 11/06/19  1051 10/16/19  0930 09/25/19  1018 08/23/19  1301 06/11/19  0918 05/06/19  0936 01/29/19  0810 01/14/19  0945    1,210 920 507* 824 572* 636* 826 864   IGM  --  31*  --   --  25* 20* 26* 43* 50*   IGE  --   --   --   --   --   --  3  --  6   IGA  --  80  --   --  15* 41* 45* 311 327   IGG1 424  --   --   --   --   --   --   --   --    IGG2 156*  --   --   --   --   --   --   --   --    IGG3 58  --   --   --   --   --   --   --   --    IGG4 13  --   --   --   --   --   --   --   --        Metabolic Studies       Recent Labs   Lab Test 11/15/19  0428 11/14/19  0404 11/13/19  0440 11/12/19  1738 11/12/19  0318 11/11/19  1744 11/11/19  0854 11/10/19  0413 11/09/19  1800  11/06/19  1051  10/20/19  1736 10/20/19  0359    137 135  --  136  --  136 134  --    < > 139   < >  --  136   POTASSIUM 3.2* 3.5 3.4 3.5 3.7 3.8 2.9* 3.4  --    < > 3.9   < >  --  3.0*   CHLORIDE 104 103 102  --  105  --  106 104  --    < > 107   < >  --  105   CO2 28 26 24  --  24  --  22 25  --    < > 27   < >  --  23   ANIONGAP 6 8 8  --  7  --  8 6  --    < > 6   < >  --  8   BUN 20 21 17  --  15  --  22 18  --    < > 12   < >  --  16   CR 0.71 0.81 0.80  --  0.80  --  0.74 0.99  --    < > 0.81   < >  --  0.79   GFRESTIMATED >90 >90 >90  --  >90  --  >90 82  --    < > >90   < >  --  >90   GLC 89 91 91  --  96  --  145* 144*  --    < > 92   < >  --  92   ZHEN 8.1* 8.2* 8.2*  --  8.1*  --  8.0* 8.0*  --    < > 9.0   < >  --  8.1*   PHOS  --   --   --   --   --   --   --   --   --   --  3.5  --   --  3.2   MAG  --   --   --   --   --   --   --   --   --   --  2.2  --   --  1.9   LACT  --   --   --   --   --   --   --   --  1.6  --   --   --  1.7  --     < > = values in this interval not displayed.       Hepatic  Studies    Recent Labs   Lab Test 11/11/19  1744 11/10/19  0413 11/06/19  1051 11/01/19  0855 10/29/19  0921 10/25/19  1257 10/16/19  0930   BILITOTAL  --  0.8 0.8 0.7 0.6 0.5 0.8   ALKPHOS  --  101 124 135 153* 207* 114   ALBUMIN  --  2.6* 3.7 3.5 3.3* 3.2* 3.2*   AST  --  10 14 17 13 16 13   ALT  --  17 24 28 30 36 22    282* 196  --   --   --  214       Pancreatitis testing    Recent Labs   Lab Test 11/11/19  0854 10/22/19  0355   TRIG 138 186*       Hematology Studies      Recent Labs   Lab Test 11/15/19  1256 11/15/19  0428 11/14/19  0404 11/13/19  0440 11/12/19  0318 11/11/19  1744 11/11/19  0854   WBC 2.1* 2.2* 2.4* 2.9* 2.7* 2.5* 2.5*   ANEU  --  1.3* 1.7 2.3 2.1 2.0 2.0   ALYM  --  0.4* 0.3* 0.2* 0.3* 0.1* 0.1*   NEGAR  --  0.0 0.1 0.1 0.0 0.1 0.1   AEOS  --  0.4 0.4 0.3 0.3 0.4 0.4   HGB 9.0* 8.8* 8.0* 8.9* 9.0* 8.7* 7.6*   HCT 28.0* 26.9* 24.6* 27.1* 27.2* 26.7* 23.2*   PLT 57* 27* 15* 13* 29* 19* 16*       Arterial Blood Gas Testing    Recent Labs   Lab Test 01/14/19  1029   PH 7.43   PCO2 36   PO2 90   HCO3 23   O2PER 21        Urine Studies     Recent Labs   Lab Test 11/10/19  1410 11/08/19  1148 09/25/19  0822 09/23/19  1757 07/21/19 2013   URINEPH 6.0 5.0 7.5* 6.0 6.0   NITRITE Negative Negative Negative Negative Negative   LEUKEST Negative Negative Negative Negative Negative   WBCU 2 3 <1 1 1       Microbiology:  Blood cx negative.     Last check of C difficile  C Diff Toxin B PCR   Date Value Ref Range Status   05/24/2019 Negative NEG^Negative Final     Comment:     Negative: Clostridium difficile target DNA sequences NOT detected, presumed   negative for Clostridium difficile toxin B or the number of bacteria present   may be below the limit of detection for the test.  FDA approved assay performed using [employer] GeneXpert real-time PCR.  A negative result does not exclude actual disease due to Clostridium difficile   and may be due to improper collection, handling and storage of the specimen    or the number of organisms in the specimen is below the detection limit of the   assay.         Virology:  CMV viral loads  No results found for: 86220, 41707, 74575, 37257  CMV viral loads    Recent Labs   Lab Test 11/12/19  0318 10/16/19  0930   CSPEC EDTA PLASMA Plasma, EDTA anticoagulant   CMVLOG Not Calculated Not Calculated       CMV viral loads    Log IU/mL of CMVQNT   Date Value Ref Range Status   11/12/2019 Not Calculated <2.1 [Log_IU]/mL Final   10/16/2019 Not Calculated <2.1 [Log_IU]/mL Final   10/08/2019 Not Calculated <2.1 [Log_IU]/mL Final   09/25/2019 Not Calculated <2.1 [Log_IU]/mL Final   08/23/2019 Not Calculated <2.1 [Log_IU]/mL Final   08/06/2019 Not Calculated <2.1 [Log_IU]/mL Final   07/15/2019 Not Calculated <2.1 [Log_IU]/mL Final   07/08/2019 Not Calculated <2.1 [Log_IU]/mL Final   07/01/2019 Not Calculated <2.1 [Log_IU]/mL Final   06/24/2019 Not Calculated <2.1 [Log_IU]/mL Final   06/17/2019 Not Calculated <2.1 [Log_IU]/mL Final   06/07/2019 Not Calculated <2.1 [Log_IU]/mL Final   05/29/2019 Not Calculated <2.1 [Log_IU]/mL Final   05/17/2019 Not Calculated <2.1 [Log_IU]/mL Final       CMV resistance testing  No lab results found.  No results found for: CMVCID, CMVFOS, CMVGAN     HHV6 DNA Result   Date Value Ref Range Status   11/12/2019 No HHV6 DNA detected NOHHV^No HHV6 DNA detected Copies/mL Final       EBV DNA Copies/mL   Date Value Ref Range Status   11/12/2019 <500 (A) EBVNEG^EBV DNA Not Detected [Copies]/mL Final     Comment:     EBV DNA Detected below the reportable range of 500 Copies/mL   10/16/2019 2,725 (A) EBVNEG^EBV DNA Not Detected [Copies]/mL Final   10/08/2019 1,203 (A) EBVNEG^EBV DNA Not Detected [Copies]/mL Final   09/25/2019 2,215 (A) EBVNEG^EBV DNA Not Detected [Copies]/mL Final       BK viral loads No lab results found.    Imaging:  CT c/a/p 11/9/2019  IMPRESSION: In this patient with history of multiple myeloma status  post bone marrow transplant:  1. No specific  findings to explain patient's fever of unknown origin.   2. There is a 1.6 cm hypoenhancing ill-defined focus in the superior  pole the right kidney. This is thought to represent sequela of prior  inflammation. Occult malignancy is thought less likely but not  entirely excluded. Recommend follow-up MRI in outpatient setting.  3. The spine is severely demineralized with multilevel compression  fractures which are stable.  4. Stable bilateral axillary, mediastinal, and superficial inguinal  lymphadenopathy.  5. Stable small peripheral ill-defined hypodensities within the  spleen, nonspecific and may be infarctions.        Collin Albarado MD  Pager: [phone]

## 2019-11-15 NOTE — ANESTHESIA POSTPROCEDURE EVALUATION
Anesthesia POST Procedure Evaluation    Patient: Angel Yanez   MRN:     [id number] Gender:   male   Age:    59 year old :      1960        Preoperative Diagnosis: Multiple myeloma not having achieved remission (H) [C90.00]   Procedure(s):  Right axillary lymph node biopsy   Postop Comments: No value filed.       Anesthesia Type:  Not documented  General    Reportable Event: NO     PAIN: Uncomplicated   Sign Out status: Comfortable, Well controlled pain     PONV: No PONV   Sign Out status:  No Nausea or Vomiting     Neuro/Psych: Uneventful perioperative course   Sign Out Status: Preoperative baseline; Age appropriate mentation     Airway/Resp.: Uneventful perioperative course   Sign Out Status: Non labored breathing, age appropriate RR; Resp. Status within EXPECTED Parameters     CV: Uneventful perioperative course   Sign Out status: Appropriate BP and perfusion indices; Appropriate HR/Rhythm     Disposition:   Sign Out in:  PACU  Disposition:  Phase II; Home  Recovery Course: Uneventful  Follow-Up: Not required     Comments/Narrative:  Looks well, bit of a cough           Last Anesthesia Record Vitals:  CRNA VITALS  11/15/2019 0938 - 11/15/2019 1033      11/15/2019             Resp Rate (set):  10      CRNA VITALS  11/15/2019 0946 - 11/15/2019 1033      11/15/2019             Resp Rate (set):  10          Last PACU Vitals:  Vitals Value Taken Time   /67 11/15/2019 10:30 AM   Temp 36.7  C (98  F) 11/15/2019 10:15 AM   Pulse 64 11/15/2019 10:30 AM   Resp 20 11/15/2019 10:15 AM   SpO2 99 % 11/15/2019 10:33 AM   Temp src     NIBP     Pulse     SpO2     Resp     Temp     Ht Rate 0 11/15/2019 10:16 AM   Temp 2     Vitals shown include unvalidated device data.      Electronically Signed By: Sunday Hager MD, November 15, 2019, 10:33 AM

## 2019-11-16 LAB
ANION GAP SERPL CALCULATED.3IONS-SCNC: 4 MMOL/L (ref 3–14)
ANISOCYTOSIS BLD QL SMEAR: SLIGHT
BACTERIA SPEC CULT: NO GROWTH
BASOPHILS # BLD AUTO: 0 10E9/L (ref 0–0.2)
BASOPHILS NFR BLD AUTO: 0 %
BRUCELLA AB TITR SER AGGL: NORMAL {TITER}
BUN SERPL-MCNC: 20 MG/DL (ref 7–30)
CALCIUM SERPL-MCNC: 8.3 MG/DL (ref 8.5–10.1)
CHLORIDE SERPL-SCNC: 108 MMOL/L (ref 94–109)
CO2 SERPL-SCNC: 30 MMOL/L (ref 20–32)
CREAT SERPL-MCNC: 0.69 MG/DL (ref 0.66–1.25)
DIFFERENTIAL METHOD BLD: ABNORMAL
EOSINOPHIL # BLD AUTO: 0.5 10E9/L (ref 0–0.7)
EOSINOPHIL NFR BLD AUTO: 22.8 %
ERYTHROCYTE [DISTWIDTH] IN BLOOD BY AUTOMATED COUNT: 21.6 % (ref 10–15)
GFR SERPL CREATININE-BSD FRML MDRD: >90 ML/MIN/{1.73_M2}
GLUCOSE SERPL-MCNC: 91 MG/DL (ref 70–99)
GRAM STN SPEC: NORMAL
HCT VFR BLD AUTO: 26.3 % (ref 40–53)
HGB BLD-MCNC: 8.6 G/DL (ref 13.3–17.7)
LYMPHOCYTES # BLD AUTO: 0.6 10E9/L (ref 0.8–5.3)
LYMPHOCYTES NFR BLD AUTO: 27.2 %
Lab: NORMAL
Lab: NORMAL
MCH RBC QN AUTO: 31.9 PG (ref 26.5–33)
MCHC RBC AUTO-ENTMCNC: 32.7 G/DL (ref 31.5–36.5)
MCV RBC AUTO: 97 FL (ref 78–100)
MICROCYTES BLD QL SMEAR: PRESENT
MONOCYTES # BLD AUTO: 0.1 10E9/L (ref 0–1.3)
MONOCYTES NFR BLD AUTO: 2.6 %
NEUTROPHILS # BLD AUTO: 1 10E9/L (ref 1.6–8.3)
NEUTROPHILS NFR BLD AUTO: 47.4 %
OVALOCYTES BLD QL SMEAR: SLIGHT
PLATELET # BLD AUTO: 37 10E9/L (ref 150–450)
PLATELET # BLD EST: ABNORMAL 10*3/UL
POIKILOCYTOSIS BLD QL SMEAR: SLIGHT
POTASSIUM SERPL-SCNC: 3.6 MMOL/L (ref 3.4–5.3)
RBC # BLD AUTO: 2.7 10E12/L (ref 4.4–5.9)
SODIUM SERPL-SCNC: 142 MMOL/L (ref 133–144)
SPECIMEN SOURCE: NORMAL
T GONDII DNA SPEC QL NAA+PROBE: NOT DETECTED
WBC # BLD AUTO: 2.1 10E9/L (ref 4–11)

## 2019-11-16 PROCEDURE — 40000275 ZZH STATISTIC RCP TIME EA 10 MIN

## 2019-11-16 PROCEDURE — 20600000 ZZH R&B BMT

## 2019-11-16 PROCEDURE — 25000132 ZZH RX MED GY IP 250 OP 250 PS 637: Performed by: INTERNAL MEDICINE

## 2019-11-16 PROCEDURE — 87081 CULTURE SCREEN ONLY: CPT | Performed by: STUDENT IN AN ORGANIZED HEALTH CARE EDUCATION/TRAINING PROGRAM

## 2019-11-16 PROCEDURE — 87015 SPECIMEN INFECT AGNT CONCNTJ: CPT | Performed by: INTERNAL MEDICINE

## 2019-11-16 PROCEDURE — 87102 FUNGUS ISOLATION CULTURE: CPT | Performed by: INTERNAL MEDICINE

## 2019-11-16 PROCEDURE — 25000132 ZZH RX MED GY IP 250 OP 250 PS 637: Performed by: DERMATOLOGY

## 2019-11-16 PROCEDURE — 87449 NOS EACH ORGANISM AG IA: CPT | Performed by: INTERNAL MEDICINE

## 2019-11-16 PROCEDURE — 87798 DETECT AGENT NOS DNA AMP: CPT | Performed by: INTERNAL MEDICINE

## 2019-11-16 PROCEDURE — 80048 BASIC METABOLIC PNL TOTAL CA: CPT | Performed by: STUDENT IN AN ORGANIZED HEALTH CARE EDUCATION/TRAINING PROGRAM

## 2019-11-16 PROCEDURE — 36415 COLL VENOUS BLD VENIPUNCTURE: CPT | Performed by: INTERNAL MEDICINE

## 2019-11-16 PROCEDURE — 85025 COMPLETE CBC W/AUTO DIFF WBC: CPT | Performed by: STUDENT IN AN ORGANIZED HEALTH CARE EDUCATION/TRAINING PROGRAM

## 2019-11-16 PROCEDURE — 87077 CULTURE AEROBIC IDENTIFY: CPT | Performed by: INTERNAL MEDICINE

## 2019-11-16 PROCEDURE — 87205 SMEAR GRAM STAIN: CPT | Performed by: INTERNAL MEDICINE

## 2019-11-16 PROCEDURE — 25000128 H RX IP 250 OP 636: Performed by: PHYSICIAN ASSISTANT

## 2019-11-16 PROCEDURE — 87186 SC STD MICRODIL/AGAR DIL: CPT | Performed by: INTERNAL MEDICINE

## 2019-11-16 PROCEDURE — 25000132 ZZH RX MED GY IP 250 OP 250 PS 637: Performed by: STUDENT IN AN ORGANIZED HEALTH CARE EDUCATION/TRAINING PROGRAM

## 2019-11-16 PROCEDURE — 36415 COLL VENOUS BLD VENIPUNCTURE: CPT | Performed by: STUDENT IN AN ORGANIZED HEALTH CARE EDUCATION/TRAINING PROGRAM

## 2019-11-16 PROCEDURE — 87116 MYCOBACTERIA CULTURE: CPT | Performed by: INTERNAL MEDICINE

## 2019-11-16 PROCEDURE — 87070 CULTURE OTHR SPECIMN AEROBIC: CPT | Performed by: INTERNAL MEDICINE

## 2019-11-16 PROCEDURE — 87206 SMEAR FLUORESCENT/ACID STAI: CPT | Performed by: INTERNAL MEDICINE

## 2019-11-16 RX ORDER — DEXTROSE MONOHYDRATE 50 MG/ML
10-20 INJECTION, SOLUTION INTRAVENOUS
Status: DISCONTINUED | OUTPATIENT
Start: 2019-11-16 | End: 2019-11-17 | Stop reason: HOSPADM

## 2019-11-16 RX ADMIN — DEXTROSE MONOHYDRATE 20 ML: 50 INJECTION, SOLUTION INTRAVENOUS at 12:25

## 2019-11-16 RX ADMIN — CEFEPIME 2 G: 2 INJECTION, POWDER, FOR SOLUTION INTRAVENOUS at 22:02

## 2019-11-16 RX ADMIN — CELECOXIB 100 MG: 100 CAPSULE ORAL at 06:55

## 2019-11-16 RX ADMIN — ACYCLOVIR 800 MG: 800 TABLET ORAL at 19:49

## 2019-11-16 RX ADMIN — Medication 400 MCG: at 12:25

## 2019-11-16 RX ADMIN — ACYCLOVIR 800 MG: 800 TABLET ORAL at 07:55

## 2019-11-16 RX ADMIN — CEFEPIME 2 G: 2 INJECTION, POWDER, FOR SOLUTION INTRAVENOUS at 13:25

## 2019-11-16 RX ADMIN — ITRACONAZOLE 200 MG: 100 CAPSULE ORAL at 07:55

## 2019-11-16 RX ADMIN — ITRACONAZOLE 200 MG: 100 CAPSULE ORAL at 17:51

## 2019-11-16 RX ADMIN — TRIAMCINOLONE ACETONIDE: 1 CREAM TOPICAL at 07:55

## 2019-11-16 RX ADMIN — CEFEPIME 2 G: 2 INJECTION, POWDER, FOR SOLUTION INTRAVENOUS at 05:34

## 2019-11-16 RX ADMIN — PAROXETINE HYDROCHLORIDE HEMIHYDRATE 20 MG: 20 TABLET, FILM COATED ORAL at 07:55

## 2019-11-16 RX ADMIN — CELECOXIB 100 MG: 100 CAPSULE ORAL at 19:49

## 2019-11-16 RX ADMIN — TRIAMCINOLONE ACETONIDE: 1 CREAM TOPICAL at 19:49

## 2019-11-16 ASSESSMENT — ACTIVITIES OF DAILY LIVING (ADL)
ADLS_ACUITY_SCORE: 11

## 2019-11-16 NOTE — CONSULTS
Pulmonary Consult Note    Assessment and Recommendations:  The etiologies of his fevers remain unknown. I have low suspicion that this is from a pulmonary source. Although the new GGOs on his chest CT are c/f infection, this would not explain his previous admissions for fevers. These would likely represent a viral etiology, respiratory viral panel has been negative. It is also very likely they represent edema. I do not suspect bacterial infection given he is well-appearing and cultures have been negative. Fungal etiology is possible and he is on itraconazole, but his CT is not classic for fungal infection. I am more concerned that this is autoimmune or inflammatory in nature. At this time, his CT is not impressive and would be low yield for bronchoscopy.    - no indication to bronch, at this time  - recommend diuresis  - remainder of fever work-up per ID and rheumatology    Pulmonary will sign-off    Summary:  59M multiple myeloma (s/p autologous transplant) w/ 2 recent admissions for FUO (10/16-10/23 and 9/23-9/29) readmitted 11/9 w/ fever. Pt w/ no new localizing symptoms. Last documented fever 11/13. Transplant ID, rheumatology following. Extensive ID work-up negative or pending. He is on empiric itraconazole since 10/18 for possible histoplasmosis. He is on acyclovir and pentamidine ppx. Soluble IL-2 RA elevated which raised possibility of sarcoidosis. R axillary LN biopsied by surgery on 11/15. Maculopapular rash developed 11/15, seen by derm, felt to be miliaria rubra (heat rash 2/2 fevers). Punch biopsy performed. CT chest done 11/15 showing new GGOs c/f infection. Pulmonary consulted for potential bronchoscopy. +6L this admission.    10 point review of systems negative, aside from that mentioned above    Past Medical History:   Diagnosis Date     GERD (gastroesophageal reflux disease)      H/O autologous stem cell transplant (H) 02/2005     Hyperlipidemia      Multiple myeloma (H) 2004     PONV  (postoperative nausea and vomiting)      Psoriasis      Psoriatic arthritis (H)        Past Surgical History:   Procedure Laterality Date     ARTHROPLASTY HIP       COLONOSCOPY       HERNIA REPAIR       IR CVC TUNNEL PLACEMENT > 5 YRS OF AGE  1/22/2019     IR CVC TUNNEL PLACEMENT > 5 YRS OF AGE  5/16/2019     IR CVC TUNNEL REMOVAL LEFT  2/20/2019     IR CVC TUNNEL REMOVAL RIGHT  7/23/2019     IR FOLLOW UP VISIT OUTPATIENT  1/24/2019     IR LYMPH NODE BIOPSY  10/18/2019     ORTHOPEDIC SURGERY       PROCURE BONE MARROW N/A 5/14/2019    Procedure: Bone Marrow [place];  Surgeon: Antwan Erickson MD;  Location: [facility] OR     TRANSPLANT         Family History   Problem Relation Age of Onset     Diabetes Mother      Cerebrovascular Disease Mother      Leukemia Father        Social History     Socioeconomic History     Marital status:      Spouse name: Not on file     Number of children: Not on file     Years of education: Not on file     Highest education level: Not on file   Occupational History     Not on file   Social Needs     Financial resource strain: Not on file     Food insecurity:     Worry: Not on file     Inability: Not on file     Transportation needs:     Medical: Not on file     Non-medical: Not on file   Tobacco Use     Smoking status: Former Smoker     Smokeless tobacco: Never Used   Substance and Sexual Activity     Alcohol use: Yes     Comment: occasionaly     Drug use: No     Sexual activity: Not on file   Lifestyle     Physical activity:     Days per week: Not on file     Minutes per session: Not on file     Stress: Not on file   Relationships     Social connections:     Talks on phone: Not on file     Gets together: Not on file     Attends Hoahaoism service: Not on file     Active member of club or organization: Not on file     Attends meetings of clubs or organizations: Not on file     Relationship status: Not on file     Intimate partner violence:     Fear of current or ex partner: Not  on file     Emotionally abused: Not on file     Physically abused: Not on file     Forced sexual activity: Not on file   Other Topics Concern     Parent/sibling w/ CABG, MI or angioplasty before 65F 55M? 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 He has not worked in the field since ~ 2005. He lives in [place] with his wife & dog. His wife does the yardwork. There are outdoor birds on the property. 2 of their children live in [place]. No prior TB exposures.        /74 (BP Location: Left arm)   Pulse 72   Temp 98  F (36.7  C) (Axillary)   Resp 16   Wt 79.9 kg (176 lb 3.2 oz)   SpO2 96%   BMI 24.71 kg/m    Gen: NAD  HEENT: OP clear  Card: RRR  Pulm: crackles b/l bases  Abd: NTND  MSK: no edema  Psych: normal affect  Neuro: AAOx3    Labs: personally reviewed    Imaging: personally reviewed    Kai Hall MD  Pulmonary and Critical Care Medicine  [phone]

## 2019-11-16 NOTE — PROGRESS NOTES
[facility]    Transplant Infectious Diseases Inpatient Progress note      Angel Yanez MRN# [id number]   YOB: 1960 Age: 59 year old   Date of Admission: 11/9/2019  1:25 PM             Recommendations:   1. Will follow on LN pathology and microbiology, also on the next generation bacterial, mycobacterial, fungal PCR on LN biopsy from 10/18/2019, and all the rest of the pending labs.   2. Please check BD glucan.   3. I doubt the need for cefepime.         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         Active Problems and Infectious Diseases Issues:   1. FUO with LA.  Extensive infectious workup has been negative. Now s/p right axillary LN excisional biopsy.   The positive C pneumoniae at very low level of 1:64 represents prior exposure.     It is possible that smoldering fungal infectious process not responding to itraconazole due to resistance, we are going to follow up on lymph node biopsy results.   Failure due to subtherapeutic itraconazole levels is less likely given itraconazole level of 0.9, hydroxyitraconazole of 1.2, total of 2.1 which is therapeutic.     2. Torso rash.   Looks like folliculitis.     3. Cough with GGO on CT  With pleural effusions and GGO, I think it's more c/w edema. Will wait for pulmonary input.   If this is an infectious process, the CT scan is more c/w PJP, however, nosocomial PJP infection is very rare. I doubt that this is bacterial pneumonia and I think we should discontinue cefepime.    We are checking for PJP PCR and BD glucan. Though PJP breakthrough infection while on pentamidine is possible, at this point the clinical picture is not c/w pneumonia.         Old Problems and Infectious Diseases Issues:   1. P aeruginosa BSI due to line infection treated with cefepime and removal of the line.     Other Infectious Disease issues include:  - on  itraconazole starting 10/18/2019.   - PCP prophylaxis: pentamidine, last dose 10/17/2019.   - Serostatus: CMV+, EBV+, HSV1-/2+, VZV ?      Attestation:  I interviewed the patient and obtained history from the patient, and by reviewing the patient's chart including outside records, microbiological data, and radiological data. All data are summarized in this notes.  Collin Albarado MD   Pager: [phone]  11/16/2019      Interim History and Events:   Was started on eltrombopag for possible ITP 11/11/2019.   Looks more comfortable than yesterday. Fever curve has improved with higher dose of celebrex.   Still with fever however.   With mild non-productive cough.       ROS:  As mentioned in the interim history otherwise negative by reviewing constitutional symptoms, central and peripheral neurological systems, respiratory system, cardiac system, GI system,  system, musculoskeletal, skin, allergy, and lymphatics.                 Pysical Examination:  Temp: 98  F (36.7  C) Temp src: Axillary BP: 129/74 Pulse: 72 Heart Rate: 71 Resp: 16 SpO2: 96 % O2 Device: None (Room air)    Vitals:    11/12/19 0854 11/13/19 0800 11/14/19 0810 11/15/19 1056   Weight: 81.4 kg (179 lb 7.3 oz) 81.1 kg (178 lb 14.4 oz) 80.2 kg (176 lb 12.8 oz) 80.5 kg (177 lb 7.5 oz)    11/16/19 0735   Weight: 79.9 kg (176 lb 3.2 oz)     Constitutional: awake, alert, cooperative, appears at stated age, well nourished.   Skin: erythematous base pustules on the torso, maybe early ones on the legs.        Medications:  Medications that Require Transfusion:     sodium chloride 10 mL/hr at 11/11/19 0942     Scheduled Medications:     acyclovir  800 mg Oral BID     ceFEPIme (MAXIPIME) IV  2 g Intravenous Q8H     celecoxib  100 mg Oral BID     dextrose 5% water  10-20 mL Intravenous Daily at 8 pm    And     filgrastim (NEUPOGEN/GRANIX) intravenous  5 mcg/kg Intravenous Once    And     dextrose 5% water  10-20 mL Intravenous Daily at 8 pm     itraconazole  200 mg  Oral BID     PARoxetine  20 mg Oral QAM     sodium chloride (PF)  3 mL Intracatheter Q8H     triamcinolone   Topical BID       Laboratory Data:   Absolute CD4   Date Value Ref Range Status   11/12/2019 146 (L) 441 - 2,156 cells/uL Final       Inflammatory Markers    Recent Labs   Lab Test 11/10/19  0413 09/25/19  1022   *  --    .0* 160.0*       Immune Globulin Studies     Recent Labs   Lab Test 11/13/19  0440 11/06/19  1051 10/16/19  0930 09/25/19  1018 08/23/19  1301 06/11/19  0918 05/06/19  0936 01/29/19  0810 01/14/19  0945    1,210 920 507* 824 572* 636* 826 864   IGM  --  31*  --   --  25* 20* 26* 43* 50*   IGE  --   --   --   --   --   --  3  --  6   IGA  --  80  --   --  15* 41* 45* 311 327   IGG1 424  --   --   --   --   --   --   --   --    IGG2 156*  --   --   --   --   --   --   --   --    IGG3 58  --   --   --   --   --   --   --   --    IGG4 13  --   --   --   --   --   --   --   --        Metabolic Studies       Recent Labs   Lab Test 11/16/19  0500 11/15/19  2028 11/15/19  0428 11/14/19  0404 11/13/19  0440 11/12/19  1738 11/12/19  0318  11/11/19  0854  11/09/19  1800  11/06/19  1051  10/20/19  1736 10/20/19  0359     --  138 137 135  --  136  --  136   < >  --    < > 139   < >  --  136   POTASSIUM 3.6 3.3* 3.2* 3.5 3.4 3.5 3.7   < > 2.9*   < >  --    < > 3.9   < >  --  3.0*   CHLORIDE 108  --  104 103 102  --  105  --  106   < >  --    < > 107   < >  --  105   CO2 30  --  28 26 24  --  24  --  22   < >  --    < > 27   < >  --  23   ANIONGAP 4  --  6 8 8  --  7  --  8   < >  --    < > 6   < >  --  8   BUN 20  --  20 21 17  --  15  --  22   < >  --    < > 12   < >  --  16   CR 0.69  --  0.71 0.81 0.80  --  0.80  --  0.74   < >  --    < > 0.81   < >  --  0.79   GFRESTIMATED >90  --  >90 >90 >90  --  >90  --  >90   < >  --    < > >90   < >  --  >90   GLC 91  --  89 91 91  --  96  --  145*   < >  --    < > 92   < >  --  92   ZHEN 8.3*  --  8.1* 8.2* 8.2*  --  8.1*  --  8.0*   <  >  --    < > 9.0   < >  --  8.1*   PHOS  --   --   --   --   --   --   --   --   --   --   --   --  3.5  --   --  3.2   MAG  --   --   --   --   --   --   --   --   --   --   --   --  2.2  --   --  1.9   LACT  --   --   --   --   --   --   --   --   --   --  1.6  --   --   --  1.7  --     < > = values in this interval not displayed.       Hepatic Studies    Recent Labs   Lab Test 11/11/19  1744 11/10/19  0413 11/06/19  1051 11/01/19  0855 10/29/19  0921 10/25/19  1257 10/16/19  0930   BILITOTAL  --  0.8 0.8 0.7 0.6 0.5 0.8   ALKPHOS  --  101 124 135 153* 207* 114   ALBUMIN  --  2.6* 3.7 3.5 3.3* 3.2* 3.2*   AST  --  10 14 17 13 16 13   ALT  --  17 24 28 30 36 22    282* 196  --   --   --  214       Pancreatitis testing    Recent Labs   Lab Test 11/11/19  0854 10/22/19  0355   TRIG 138 186*       Hematology Studies      Recent Labs   Lab Test 11/16/19  0500 11/15/19  1256 11/15/19  0428 11/14/19  0404 11/13/19  0440 11/12/19  0318   WBC 2.1* 2.1* 2.2* 2.4* 2.9* 2.7*   ANEU 1.0* 1.3* 1.3* 1.7 2.3 2.1   ALYM 0.6* 0.4* 0.4* 0.3* 0.2* 0.3*   NEGAR 0.1 0.1 0.0 0.1 0.1 0.0   AEOS 0.5 0.4 0.4 0.4 0.3 0.3   HGB 8.6* 9.0* 8.8* 8.0* 8.9* 9.0*   HCT 26.3* 28.0* 26.9* 24.6* 27.1* 27.2*   PLT 37* 57* 27* 15* 13* 29*       Arterial Blood Gas Testing    Recent Labs   Lab Test 01/14/19  1029   PH 7.43   PCO2 36   PO2 90   HCO3 23   O2PER 21        Urine Studies     Recent Labs   Lab Test 11/10/19  1410 11/08/19  1148 09/25/19  0822 09/23/19  1757 07/21/19 2013   URINEPH 6.0 5.0 7.5* 6.0 6.0   NITRITE Negative Negative Negative Negative Negative   LEUKEST Negative Negative Negative Negative Negative   WBCU 2 3 <1 1 1       Microbiology:  Blood cx negative.     Last check of C difficile  C Diff Toxin B PCR   Date Value Ref Range Status   05/24/2019 Negative NEG^Negative Final     Comment:     Negative: Clostridium difficile target DNA sequences NOT detected, presumed   negative for Clostridium difficile toxin B or the number of  bacteria present   may be below the limit of detection for the test.  FDA approved assay performed using [employer] GeneXpert real-time PCR.  A negative result does not exclude actual disease due to Clostridium difficile   and may be due to improper collection, handling and storage of the specimen   or the number of organisms in the specimen is below the detection limit of the   assay.         Virology:  CMV viral loads  No results found for: 21365, 92356, 15521, 00717  CMV viral loads    Recent Labs   Lab Test 11/12/19  0318 10/16/19  0930   CSPEC EDTA PLASMA Plasma, EDTA anticoagulant   CMVLOG Not Calculated Not Calculated       CMV viral loads    Log IU/mL of CMVQNT   Date Value Ref Range Status   11/12/2019 Not Calculated <2.1 [Log_IU]/mL Final   10/16/2019 Not Calculated <2.1 [Log_IU]/mL Final   10/08/2019 Not Calculated <2.1 [Log_IU]/mL Final   09/25/2019 Not Calculated <2.1 [Log_IU]/mL Final   08/23/2019 Not Calculated <2.1 [Log_IU]/mL Final   08/06/2019 Not Calculated <2.1 [Log_IU]/mL Final   07/15/2019 Not Calculated <2.1 [Log_IU]/mL Final   07/08/2019 Not Calculated <2.1 [Log_IU]/mL Final   07/01/2019 Not Calculated <2.1 [Log_IU]/mL Final   06/24/2019 Not Calculated <2.1 [Log_IU]/mL Final   06/17/2019 Not Calculated <2.1 [Log_IU]/mL Final   06/07/2019 Not Calculated <2.1 [Log_IU]/mL Final   05/29/2019 Not Calculated <2.1 [Log_IU]/mL Final   05/17/2019 Not Calculated <2.1 [Log_IU]/mL Final       CMV resistance testing  No lab results found.  No results found for: CMVCID, CMVFOS, CMVGAN     HHV6 DNA Result   Date Value Ref Range Status   11/12/2019 No HHV6 DNA detected NOHHV^No HHV6 DNA detected Copies/mL Final       EBV DNA Copies/mL   Date Value Ref Range Status   11/12/2019 <500 (A) EBVNEG^EBV DNA Not Detected [Copies]/mL Final     Comment:     EBV DNA Detected below the reportable range of 500 Copies/mL   10/16/2019 2,725 (A) EBVNEG^EBV DNA Not Detected [Copies]/mL Final   10/08/2019 1,203 (A) EBVNEG^EBV  DNA Not Detected [Copies]/mL Final   09/25/2019 2,215 (A) EBVNEG^EBV DNA Not Detected [Copies]/mL Final       BK viral loads No lab results found.    Imaging:  CT chest 11/15/2019   IMPRESSION:   1. New groundglass opacities greatest in the upper lobes concerning  for infection.  2. Increased prominence of the interstitium suggesting interstitial  edema and/or component of infection.  3. Small bilateral pleural effusions.  4. Mildly prominent mediastinal lymph nodes are similar to slightly  decreased from 11/9/2019. Post surgical changes of right axillary  jami dissection.    CT c/a/p 11/9/2019  IMPRESSION: In this patient with history of multiple myeloma status  post bone marrow transplant:  1. No specific findings to explain patient's fever of unknown origin.   2. There is a 1.6 cm hypoenhancing ill-defined focus in the superior  pole the right kidney. This is thought to represent sequela of prior  inflammation. Occult malignancy is thought less likely but not  entirely excluded. Recommend follow-up MRI in outpatient setting.  3. The spine is severely demineralized with multilevel compression  fractures which are stable.  4. Stable bilateral axillary, mediastinal, and superficial inguinal  lymphadenopathy.  5. Stable small peripheral ill-defined hypodensities within the  spleen, nonspecific and may be infarctions.        Collin Albarado MD  Pager: [phone]

## 2019-11-16 NOTE — OP NOTE
Procedure Date: 11/15/2019      PREOPERATIVE DIAGNOSIS:  Lymphadenopathy.      POSTOPERATIVE DIAGNOSIS:  Lymphadenopathy.      PROCEDURE:  Right axillary lymph node biopsy.      STAFF:  Reese Irving MD      RESIDENT SURGEON:  Ramesh Aguirre MD      ASSISTANT SURGEON:  Gee Sullivan MD.      CLINICAL HISTORY:  Angel Yanez is a 59-year-old male with a history of multiple myeloma.  He is approximately 180 days past his bone marrow transplant.  The patient has had increased lymph nodes palpable, which are present on the CT scan.  There was a very large lymph node in the right axilla and several others as well.  The patient underwent a needle core biopsy of tissue and left axillary lymph node.  This was nondiagnostic and the Surgery Service was requested to perform a right axillary lymph node biopsy.  The patient understood the risks and benefits of surgery including the potential for injury to intra-axillary structures including nerves and veins and arteries.  The patient understood the potential for a lymph node collection of fluid, that could be a lymphocele or possible seroma.  The patient understands the risk of infection, need for second surgery and all of the anesthesia complications associated with surgery, potential myocardial infarction, pulmonary emboli, stroke and even death in the operating room.  The patient understood these risks and wished to proceed.      DESCRIPTION OF PROCEDURE:  Angel Yanez was taken to the main operating room under general endotracheal anesthesia.  The patient was prepped and draped in sterile fashion.  The patient's right arm was brought up in a sling like fashion to facilitate access to the right axilla.  The patient's right side was bumped up.  The patient's right axilla was prepped and draped in sterile fashion.  Following the appropriate timeout procedure to assure patient identification and procedure, a curvilinear incision was made in the right axilla over the right  axillary lymph nodes that were palpable.  This curvilinear incision was taken down to the level of subcutaneous fat with electrocautery.  The right axilla was entered bluntly.  Two lymph nodes were noted in the area.  These were carefully mobilized and the afferent and efferent lymphatic vessels were clipped using surgical clips and divided.  The 2-0 silk suture was used to bring the lymph nodes up to the level of the skin in this fashion.  All of the afferent and efferent lymphatic channels were clipped and divided.  The two lymph nodes were collected without injury to the associated nerves, artery and veins in the area of the right axilla.  These lymph nodes were sent fresh to Pathology.  The attention was then directed to hemostasis, which was excellent.  The wound was irrigated with sterile normal saline.  Skin edges were then reapproximated with running and interrupted 4-0 Monocryl suture and skin sealant.  The patient tolerated the procedure well.  Needle and sponge count correct x2.  The patient was returned to postanesthesia recovery in good condition.         LUIS FERNANDO TOUSSAINT MD             D: 11/15/2019   T: 2019   MT: KAYODE      Name:     SHIMON KNIGHT   MRN:      [id number]        Account:        [id number]   :      1960           Procedure Date: 11/15/2019      Document: [id number]       cc: [facility] Surgery Billing

## 2019-11-16 NOTE — PROGRESS NOTES
Sputum induction with hypertonic saline attempted. Dry NPC.  Productive cough with thick  white secretions mixed with saliva. Sample sent to lab.

## 2019-11-16 NOTE — PLAN OF CARE
./78 (BP Location: Left arm)   Pulse 72   Temp 97.8  F (36.6  C) (Axillary)   Resp 16   Wt 80.5 kg (177 lb 7.5 oz)   SpO2 96%   BMI 24.89 kg/m      Pt Avss. Denies pain or nausea. Voiding well. Received 60mEq PO Potassium last night. No replacement with recheck this am. Offers no complaints. Cont to monitor and follow poc.    Problem: Adult Inpatient Plan of Care  Goal: Plan of Care Review  Outcome: No Change     Problem: Adult Inpatient Plan of Care  Goal: Patient-Specific Goal (Individualization)  Outcome: No Change     Problem: Adult Inpatient Plan of Care  Goal: Absence of Hospital-Acquired Illness or Injury  Outcome: No Change     Problem: Fever (Fever with Neutropenia)  Goal: Baseline Body Temperature  Outcome: Improving     Problem: Infection Risk (Fever with Neutropenia)  Goal: Absence of Infection  Outcome: Improving

## 2019-11-16 NOTE — PROGRESS NOTES
Brief Dermatology Progress Note:    S:  Patient feels well. Wants to go home if possible. Rash did spread slightly down to his knees. Still asymptomatic. Not worsening on trunk. Not spread to upper extremities or face.    O:  Total skin exam performed, excluding undergarment areas or feet. Notable for:  -Jacob skin type: I  -On the trunk there are multiple pink blanchable papules and macules, worse on the back, than the chest/abdomen. Appear more follicularly based on the back than they do on the abdomen. Similar lesions are seen extending on the thighs and several down to the knees.  -No facial involvement.    A/P:  1. Pink blanchable papular eruption on the trunk and proximal lower extremities- Biopsy still pending. Differential still the same. Miliaria rubra vs early drug rash most likely, though as he is immunosuppressed, differential is broad. Remains asymptomatic.    F/u punch biopsy    Continue TAC 0.1% cream BID (avoid ointment because it would be more occlusive, if this in fact miliaria rubra)    Recommend cooling maneuvers such as fan    Daily CBC w/ diff and CMP while inpatient    Discussed with Dr. Herve Trotter MD  Internal Medicine-Dermatology, PGY-4

## 2019-11-16 NOTE — PROGRESS NOTES
BMT Progress Note    ID: Angel Yanez is a 60 yo man D+182 s/p 2nd autologous transplant for MM. S/p 2 recent readmissions for FUO (10/16-10/23; 9/23-9/29/19).     HPI:  Stable. Breathing okay. Still some SOB with exertion and cough. Both skin and lymph node bx site okay.     ROS: Negative except as stated above.    Physical Exam  /74 (BP Location: Left arm)   Pulse 72   Temp 98  F (36.7  C) (Axillary)   Resp 16   Wt 79.9 kg (176 lb 3.2 oz)   SpO2 96%   BMI 24.71 kg/m       Constitutional:  SOB noted with speaking today.   HEENT: MMM, EOM intact, sclerae clear and anicteric  Heart: Not tachy today, RRR, no murmurs appreciated  Resp:  +bibasilar lung field crackles stable.  Abd: Soft, non-tender, BS+  Lymph: No peripheral edema  Skin: +erythematous maculopapular rash noted on back. +somewhat acne like. Right axilla incision without signs of infection or bleeding this morning.  Neuro: No focal deficits  Peripheral IV x2, one in each wrist    Labs  Lab Results   Component Value Date    WBC 2.1 (L) 11/16/2019    ANEU 1.0 (L) 11/16/2019    HGB 8.6 (L) 11/16/2019    HCT 26.3 (L) 11/16/2019    PLT 37 (LL) 11/16/2019     11/16/2019    POTASSIUM 3.6 11/16/2019    CHLORIDE 108 11/16/2019    CO2 30 11/16/2019    GLC 91 11/16/2019    BUN 20 11/16/2019    CR 0.69 11/16/2019    MAG 2.2 11/06/2019    INR 1.17 (H) 11/15/2019    BILITOTAL 0.8 11/10/2019    AST 10 11/10/2019    ALT 17 11/10/2019    ALKPHOS 101 11/10/2019    PROTTOTAL 6.0 (L) 11/10/2019    ALBUMIN 2.6 (L) 11/10/2019     A/P:  Angel Yanez is a 60 yo man D+182 s/p 2nd autologous transplant for MM. S/p 2 recent readmissions for FUO (10/16-10/23; 9/23-9/29/19).      1.  BMT/MM:   Slow engraftment; cell dose was only 0.639x10^6. ([facility] [place] - known poor cell dose as failed chemo-mobilization 1/2019.)   - Stringent CR.  - day +180 bone marrow biopsy 11/6 which showed no morphologic or immunophenotypic evidence of plasma cell neoplasm.   - PET in  8/2019 clear.      2.  HEME: Keep Hgb>8g/dL, plt >20 (epistaxis requiring rhinorocket packing in last admission).   - anemic, thrombocytopenic post BMT, low cell dose, +/- infection.   - intermittent neutropenia: GCSF last on 11/6; give prn ANC </=1000. GCSF today.  - No evidence of HLH on lymphnode bx 10/18/19.     - Persistent thrombocytopenia: BM 11/6/19 showed adequate granulopoiesis and erythropoiesis but his platelet precursors were 0 to absent which is reflected in peripheral CBC. Started promactic 50mcg daily on 11/10. Increased to 100 mg on 11/14 and pending results go to max dose in a few days. 11/15 Holding promacta, 5% chance of causing rash.  - Hemolysis eval neg.   - prolonged INR. Improved with Vit K.     3.  ID: FUO: Afebrile x 48hrs on celebrex.   A.) Extensive ID work up unremarkable x 2 sept and oct. 10/16 and 11/9 repeat chest CT: persistent mediastinal adenopathy no paranchymal disease. S/P bx of L axillary node on 10/18: No evidence of HLH, tissues looks reactive.   - ID consulted 11/10, appreciate recs. Extensive ID testing negative but also lots still pending. Following cx results daily.   - Consulted general surgery for excisional lymph node bx (neg core bx 10/18 L axillary). Would like R axillary node (please send for pathology, leukemia/lymphoma, Gram stain, aerobic cx, anaerobic cx, fungal cx, AFB smear and cx, and send out to [facility] for next generation bacterial, mycobacterial, fungal PCR.   -Received prophy Ancef pre-op.  - HTLV neg with very low lymphocyte count. CD4 count 146.   - Per ID with negative w/u and not neutropenic discontinued cefepime 11/12. 11/15 CT Chest new GGO. Resumed cefepime cx's pending. RVP neg. Pulm consult today.   - Per ID, possible histoplasmosis: empiric itraconazole 200 TID started last admission with fevers initially resolved but now recurred; currently on bid dosing. Pt declining recommened ID follow-up in December. 10/18 Histo/blasto  urine antigen negative. Per Dr Kelley flucaonzole can suppress growth? 10/18 [place] DNA test- negative. 1/12 itra level=1.6.    - prophy ACV, Pentamidine (last 10/17/19)Plan on giving pentamidine in clinic on 11/18/2019    B.) Autoimmune workup so far negative. Rheum consult, last note on 11/14: feels no evidence of autoimmune disease flair neg studies above but recommended evaluate possible granulomatous finding with lymph nose biopsy to r/o sarcoidosis in the setting of FUO and elevated soluble IL-2 RA.     C.) Malignancy W/U: Neg BM BX 11/6. Neg PET in 8/2019. Neg core lymph node needle bx 10/18. PSA neg. Admit CT with R kidney hypoenhancing ill-defined focus. MRI 11/13 not suggestive of urothelial or renal cell carcinoma.       4. Pulm:   - tachypnea/SOB: mainly with fevers. On RA. Imaging w/ contrast neg. Crackles and fluid up have been giving PRN lasix. CT Chest 11/15 new GGO and small effusions. Pulm consult for possible BAL tomorrow. Made NPO after midnight just in case. Awaiting gram stain on sputum to see if good sample. RVP neg. Cefepime resumed.   - 10/17: Echo with preserved EF, no evidence of right heart strain. Repeat echo 11/14 stable.  - 10/17 VQ scan neg for PE.     5. GI:    - 11/10/2019:LFTs are wnl     5.  FEN/Renal:   - Lyte replacement per scheduled protocol.        6.  Mood: Continue Paxil. Told colleague yesterday: having lots of regret about stem cell transplant with low stem cell dose.  SW discussed this with patient, see note on 11/14/2019    7. Derm:   - Rash noted on back and now chest. Non-bothersome. Derm consult. Likely heat rash. TCM cream. Derm bx pending.    Cassie Ferrari PA-C  #[id number]      The patient was seen and examined by me separately from the midlevel provider. The note reflects our mutual assessment and plans and were approved by me personally.   I personally reviewed today's lab results vital signs and radiology results.    Each point of the assessment and plan were  reviewed by the midlevel and me and either endorsed by me or were my added decisions.    My pertinent physical findings today are: Febrile, clear lungs and rash on shoulders.anf upper trunk    My assessment and plan are: 58 yo 180 day post second auto PBSC for myeloma admitted for w/u FUO. CT CAP -. On itraconozole for possible  Histoplasmosis. Do connective tissue disease autoimmune w/u (R/O rheumatoid arthritis, SLE ans also Lyme disease.) Consult ID. Schedule celebrex for comfort. Consult rheumatology. Increase celebres to 100 mg po BID. Check PSA. Appreciate  ID and rheum recommendation for extensive w/u: will implement. Lymph node biopsy result pending. MRI kidney:small mass not suggestive of cancer. CXR, check RVP.   Cardia echo for new pleural effusions. Desating: repeat lung CT. Hold promacta for rash. Lung CT shows new GGO. Consult pulmonary for possible bronch. G-CSF for low ANC.    Stephan Godwin M.D.

## 2019-11-16 NOTE — PROGRESS NOTES
[facility], [place]    Progress Note    Assessment & Plan   Procedure(s):  Right axillary lymph node biopsy  POD 1      Plan:  Mr. Yanez is a 58 yo M with a h/o MM s/p 2 autologous bone marrow transplants now with fevers of unknown origin that have required 3 hospitalizations without an etiology.  - Pt doing well  - Surgery will sign off; call or page with any questions.     Principal Problem:    Multiple myeloma not having achieved remission (H)  Active Problems:    Recurrent fever    Seen and discussed with chief resident and staff.    Sofiya Arora  PGY 1 Family Medicine with General [place]    Interval History   Stable.  Doing well. UAL. No issues overnight. No fevers. No n/v. No arm swelling.    Physical Exam   Temp: 98  F (36.7  C) Temp src: Axillary BP: 129/74 Pulse: 72 Heart Rate: 71 Resp: 16 SpO2: 96 % O2 Device: None (Room air) Oxygen Delivery: 4 LPM  Vitals:    11/14/19 0810 11/15/19 1056 11/16/19 0735   Weight: 80.2 kg (176 lb 12.8 oz) 80.5 kg (177 lb 7.5 oz) 79.9 kg (176 lb 3.2 oz)     Vital Signs with Ranges  Temp:  [97.6  F (36.4  C)-99.3  F (37.4  C)] 98  F (36.7  C)  Pulse:  [64-83] 72  Heart Rate:  [64-73] 71  Resp:  [16-20] 16  BP: (105-129)/(58-80) 129/74  SpO2:  [92 %-100 %] 96 %  I/O last 3 completed shifts:  In: 3180 [P.O.:2320; I.V.:860]  Out: 2355 [Urine:2350; Blood:5]    Constitutional: awake, alert, cooperative, no apparent distress, and appears stated age  Eyes: Lids and lashes normal, pupils equal, round and reactive to light, extra ocular muscles intact, sclera clear, conjunctiva normal  GI: No scars, normal bowel sounds, soft, non-distended, non-tender, no masses palpated, no hepatosplenomegally  Skin: normal skin color, texture, turgor and no rashes  Musculoskeletal: Incision R axillary c/d/i, no lower or upper extremity pitting edema present, full range of motion noted        Sofiya Arora MD 11/14/2019 8:58 AM  PGY 1 Family Medicine with General  Surgery    Medications     sodium chloride 10 mL/hr at 11/11/19 0942        acyclovir  800 mg Oral BID     ceFEPIme (MAXIPIME) IV  2 g Intravenous Q8H     celecoxib  100 mg Oral BID     dextrose 5% water  10-20 mL Intravenous Daily at 8 pm    And     filgrastim (NEUPOGEN/GRANIX) intravenous  5 mcg/kg Intravenous Once    And     dextrose 5% water  10-20 mL Intravenous Daily at 8 pm     itraconazole  200 mg Oral BID     PARoxetine  20 mg Oral QAM     sodium chloride (PF)  3 mL Intracatheter Q8H     triamcinolone   Topical BID       Data   Recent Labs   Lab 11/16/19  0500 11/15/19  2028 11/15/19  1256 11/15/19  0428 11/14/19  0404  11/12/19  0318  11/10/19  0413   WBC 2.1*  --  2.1* 2.2* 2.4*   < > 2.7*   < > 2.8*   HGB 8.6*  --  9.0* 8.8* 8.0*   < > 9.0*   < > 6.8*   MCV 97  --  98 97 95   < > 95   < > 106*   PLT 37*  --  57* 27* 15*   < > 29*   < > 8*   INR  --   --  1.17*  --  1.24*  --  1.27*  --   --      --   --  138 137   < > 136   < > 134   POTASSIUM 3.6 3.3*  --  3.2* 3.5   < > 3.7   < > 3.4   CHLORIDE 108  --   --  104 103   < > 105   < > 104   CO2 30  --   --  28 26   < > 24   < > 25   BUN 20  --   --  20 21   < > 15   < > 18   CR 0.69  --   --  0.71 0.81   < > 0.80   < > 0.99   ANIONGAP 4  --   --  6 8   < > 7   < > 6   ZHEN 8.3*  --   --  8.1* 8.2*   < > 8.1*   < > 8.0*   GLC 91  --   --  89 91   < > 96   < > 144*   ALBUMIN  --   --   --   --   --   --   --   --  2.6*   PROTTOTAL  --   --   --   --   --   --   --   --  6.0*   BILITOTAL  --   --   --   --   --   --   --   --  0.8   ALKPHOS  --   --   --   --   --   --   --   --  101   ALT  --   --   --   --   --   --   --   --  17   AST  --   --   --   --   --   --   --   --  10    < > = values in this interval not displayed.     Recent Results (from the past 24 hour(s))   CT Chest w/o Contrast    Narrative    EXAM:  CT CHEST W/O CONTRAST . 11/15/2019 2:45 PM     TECHNIQUE:  Helical CT images from the thoracic inlet through the  upper abdomen were  obtained without intravenous contrast.    COMPARISON: CT 11/9/2019    PROVIDED HISTORY: new hypoxia, worsening cough, FUO    ADDITIONAL HISTORY FROM THE EMR: Status post right axillary jami  dissection today 11/15/2019    FINDINGS:  CHEST:  LUNGS: The central tracheal bronchial tree is patent. Small bilateral  pleural effusions, slightly greater on the right. Dependent  attenuation suggesting atelectasis. New groundglass attenuation in the  apices (series 3, image 10, 13). Interlobular septal thickening,  greatest in the subpleural spaces and lung bases.    MEDIASTINUM/AXILLAE: Heart size is within normal limits. No  pericardial effusion. Postsurgical changes of right axillary jami  dissection, with expected soft tissue emphysema and edema. No focal  fluid collection in the right axilla. Unchanged mildly prominent left  axillary lymph nodes. Calcified right hilar and mediastinal lymph  nodes. Multiple mildly prominent noncalcified lymph nodes as well, for  example 1.2 cm right paratracheal lymph node (series 2, image 21) and  1.6 cm subcarinal lymph node (series 2, image 27), similar to mildly  decreased from prior. Visualized thyroid is unremarkable.    UPPER ABDOMEN: Scattered splenic calcifications suggesting calcified  granulomata.    BONES: Generalized osteopenia. Unchanged multilevel compression  deformities throughout the thoracic and visualized lumbar spine  involving T5, and T7 extending into the lumbar spine. This is most  severe at L1 with approximately 60% central height loss.      Impression    IMPRESSION:   1. New groundglass opacities greatest in the upper lobes concerning  for infection.  2. Increased prominence of the interstitium suggesting interstitial  edema and/or component of infection.  3. Small bilateral pleural effusions.  4. Mildly prominent mediastinal lymph nodes are similar to slightly  decreased from 11/9/2019. Post surgical changes of right axillary  jami dissection.    I have  personally reviewed the examination and initial interpretation  and I agree with the findings.    JAKE JOSEPH MD

## 2019-11-16 NOTE — PLAN OF CARE
/80 (BP Location: Left arm)   Pulse 92   Temp 98.1  F (36.7  C) (Axillary)   Resp 18   Wt 79.9 kg (176 lb 3.2 oz)   SpO2 95%   BMI 24.71 kg/m  . PIV is patent and saline locked. No c/o pain or n/v during this shift. Patient is eating and drinking well. Patient received G-CSF and tolerated. Patient wanted to go home today, but no orders for discharge to home. Patient continues to have the dry coughs. Patient walks on the hallways in multiple times during this shift. Continue with plan of care and notify MD for status changes.    Problem: Adult Inpatient Plan of Care  Goal: Patient-Specific Goal (Individualization)  11/16/2019 1652 by Catie Morales RN  Outcome: No Change     Problem: Adult Inpatient Plan of Care  Goal: Absence of Hospital-Acquired Illness or Injury  11/16/2019 1652 by Catie Morales, EULALIO  Outcome: No Change     Problem: Adult Inpatient Plan of Care  Goal: Readiness for Transition of Care  Outcome: No Change     Problem: Adult Inpatient Plan of Care  Goal: Rounds/Family Conference  Outcome: No Change     Problem: Adult Inpatient Plan of Care  Goal: Plan of Care Review  11/16/2019 1652 by Catie Morales RN  Outcome: Improving  Flowsheets (Taken 11/16/2019 1652)  Plan of Care Reviewed With: patient     Problem: Adult Inpatient Plan of Care  Goal: Optimal Comfort and Wellbeing  Outcome: Improving

## 2019-11-17 VITALS
TEMPERATURE: 98.4 F | HEART RATE: 89 BPM | SYSTOLIC BLOOD PRESSURE: 124 MMHG | DIASTOLIC BLOOD PRESSURE: 75 MMHG | BODY MASS INDEX: 24.34 KG/M2 | RESPIRATION RATE: 18 BRPM | OXYGEN SATURATION: 97 % | WEIGHT: 173.5 LBS

## 2019-11-17 LAB
ABO + RH BLD: NORMAL
ABO + RH BLD: NORMAL
ANION GAP SERPL CALCULATED.3IONS-SCNC: 5 MMOL/L (ref 3–14)
ANISOCYTOSIS BLD QL SMEAR: ABNORMAL
BACTERIA SPEC CULT: NO GROWTH
BASOPHILS # BLD AUTO: 0 10E9/L (ref 0–0.2)
BASOPHILS NFR BLD AUTO: 0 %
BLD GP AB SCN SERPL QL: NORMAL
BLD PROD TYP BPU: NORMAL
BLD UNIT ID BPU: 0
BLOOD BANK CMNT PATIENT-IMP: NORMAL
BLOOD PRODUCT CODE: NORMAL
BPU ID: NORMAL
BUN SERPL-MCNC: 15 MG/DL (ref 7–30)
CALCIUM SERPL-MCNC: 8.2 MG/DL (ref 8.5–10.1)
CHLORIDE SERPL-SCNC: 110 MMOL/L (ref 94–109)
CO2 SERPL-SCNC: 27 MMOL/L (ref 20–32)
CREAT SERPL-MCNC: 0.62 MG/DL (ref 0.66–1.25)
DIFFERENTIAL METHOD BLD: ABNORMAL
EOSINOPHIL # BLD AUTO: 0.8 10E9/L (ref 0–0.7)
EOSINOPHIL NFR BLD AUTO: 19.5 %
ERYTHROCYTE [DISTWIDTH] IN BLOOD BY AUTOMATED COUNT: 21.2 % (ref 10–15)
GFR SERPL CREATININE-BSD FRML MDRD: >90 ML/MIN/{1.73_M2}
GLUCOSE SERPL-MCNC: 92 MG/DL (ref 70–99)
HCT VFR BLD AUTO: 25.9 % (ref 40–53)
HGB BLD-MCNC: 8.3 G/DL (ref 13.3–17.7)
LYMPHOCYTES # BLD AUTO: 0.7 10E9/L (ref 0.8–5.3)
LYMPHOCYTES NFR BLD AUTO: 16.8 %
Lab: NORMAL
MCH RBC QN AUTO: 31.4 PG (ref 26.5–33)
MCHC RBC AUTO-ENTMCNC: 32 G/DL (ref 31.5–36.5)
MCV RBC AUTO: 98 FL (ref 78–100)
MISCELLANEOUS TEST: NORMAL
MONOCYTES # BLD AUTO: 0.2 10E9/L (ref 0–1.3)
MONOCYTES NFR BLD AUTO: 6.2 %
NEUTROPHILS # BLD AUTO: 2.3 10E9/L (ref 1.6–8.3)
NEUTROPHILS NFR BLD AUTO: 57.5 %
NUM BPU REQUESTED: 1
NUM BPU REQUESTED: 1
OVALOCYTES BLD QL SMEAR: SLIGHT
PLATELET # BLD AUTO: 22 10E9/L (ref 150–450)
PLATELET # BLD EST: ABNORMAL 10*3/UL
POIKILOCYTOSIS BLD QL SMEAR: SLIGHT
POTASSIUM SERPL-SCNC: 3.9 MMOL/L (ref 3.4–5.3)
RBC # BLD AUTO: 2.64 10E12/L (ref 4.4–5.9)
RESULT: NORMAL
SEND OUTS MISC TEST CODE: NORMAL
SEND OUTS MISC TEST SPECIMEN: NORMAL
SODIUM SERPL-SCNC: 142 MMOL/L (ref 133–144)
SPECIMEN EXP DATE BLD: NORMAL
SPECIMEN SOURCE: NORMAL
TEST NAME: NORMAL
TRANSFUSION STATUS PATIENT QL: NORMAL
TRANSFUSION STATUS PATIENT QL: NORMAL
WBC # BLD AUTO: 4 10E9/L (ref 4–11)

## 2019-11-17 PROCEDURE — 85025 COMPLETE CBC W/AUTO DIFF WBC: CPT | Performed by: STUDENT IN AN ORGANIZED HEALTH CARE EDUCATION/TRAINING PROGRAM

## 2019-11-17 PROCEDURE — 36415 COLL VENOUS BLD VENIPUNCTURE: CPT | Performed by: STUDENT IN AN ORGANIZED HEALTH CARE EDUCATION/TRAINING PROGRAM

## 2019-11-17 PROCEDURE — 86923 COMPATIBILITY TEST ELECTRIC: CPT | Performed by: INTERNAL MEDICINE

## 2019-11-17 PROCEDURE — 86901 BLOOD TYPING SEROLOGIC RH(D): CPT | Performed by: INTERNAL MEDICINE

## 2019-11-17 PROCEDURE — 86900 BLOOD TYPING SEROLOGIC ABO: CPT | Performed by: INTERNAL MEDICINE

## 2019-11-17 PROCEDURE — 25000132 ZZH RX MED GY IP 250 OP 250 PS 637: Performed by: INTERNAL MEDICINE

## 2019-11-17 PROCEDURE — 25000132 ZZH RX MED GY IP 250 OP 250 PS 637: Performed by: STUDENT IN AN ORGANIZED HEALTH CARE EDUCATION/TRAINING PROGRAM

## 2019-11-17 PROCEDURE — 25000128 H RX IP 250 OP 636: Performed by: PHYSICIAN ASSISTANT

## 2019-11-17 PROCEDURE — 86850 RBC ANTIBODY SCREEN: CPT | Performed by: PHYSICIAN ASSISTANT

## 2019-11-17 PROCEDURE — 86850 RBC ANTIBODY SCREEN: CPT | Performed by: INTERNAL MEDICINE

## 2019-11-17 PROCEDURE — P9037 PLATE PHERES LEUKOREDU IRRAD: HCPCS | Performed by: STUDENT IN AN ORGANIZED HEALTH CARE EDUCATION/TRAINING PROGRAM

## 2019-11-17 PROCEDURE — 25000132 ZZH RX MED GY IP 250 OP 250 PS 637: Performed by: PHYSICIAN ASSISTANT

## 2019-11-17 PROCEDURE — 80048 BASIC METABOLIC PNL TOTAL CA: CPT | Performed by: STUDENT IN AN ORGANIZED HEALTH CARE EDUCATION/TRAINING PROGRAM

## 2019-11-17 RX ORDER — AZITHROMYCIN 250 MG/1
250 TABLET, FILM COATED ORAL DAILY
Qty: 4 TABLET | Refills: 0 | Status: SHIPPED | OUTPATIENT
Start: 2019-11-18 | End: 2019-11-21

## 2019-11-17 RX ORDER — AZITHROMYCIN 250 MG/1
250 TABLET, FILM COATED ORAL DAILY
Status: DISCONTINUED | OUTPATIENT
Start: 2019-11-18 | End: 2019-11-17 | Stop reason: HOSPADM

## 2019-11-17 RX ORDER — PENTAMIDINE ISETHIONATE 300 MG/300MG
300 INHALANT RESPIRATORY (INHALATION)
COMMUNITY
Start: 2019-11-17 | End: 2020-04-24

## 2019-11-17 RX ORDER — TRIAMCINOLONE ACETONIDE 1 MG/G
CREAM TOPICAL 2 TIMES DAILY
Qty: 453.6 G | Refills: 0 | Status: SHIPPED | OUTPATIENT
Start: 2019-11-17 | End: 2019-12-31

## 2019-11-17 RX ORDER — AZITHROMYCIN 250 MG/1
500 TABLET, FILM COATED ORAL ONCE
Status: COMPLETED | OUTPATIENT
Start: 2019-11-17 | End: 2019-11-17

## 2019-11-17 RX ADMIN — CELECOXIB 100 MG: 100 CAPSULE ORAL at 08:13

## 2019-11-17 RX ADMIN — CEFEPIME 2 G: 2 INJECTION, POWDER, FOR SOLUTION INTRAVENOUS at 05:49

## 2019-11-17 RX ADMIN — AZITHROMYCIN MONOHYDRATE 500 MG: 250 TABLET ORAL at 09:27

## 2019-11-17 RX ADMIN — ITRACONAZOLE 200 MG: 100 CAPSULE ORAL at 08:13

## 2019-11-17 RX ADMIN — PAROXETINE HYDROCHLORIDE HEMIHYDRATE 20 MG: 20 TABLET, FILM COATED ORAL at 08:12

## 2019-11-17 RX ADMIN — TRIAMCINOLONE ACETONIDE: 1 CREAM TOPICAL at 08:14

## 2019-11-17 RX ADMIN — ACYCLOVIR 800 MG: 800 TABLET ORAL at 08:12

## 2019-11-17 ASSESSMENT — ACTIVITIES OF DAILY LIVING (ADL)
ADLS_ACUITY_SCORE: 11

## 2019-11-17 NOTE — PROGRESS NOTES
Pt discharged to:  Home.   Via: Car.   Accompanied by: Wife is picking him up.   Belongings:  With the patient.   Teaching: Done by RN and patient verbalized understanding. Patient is happy going home after a week.   Clinic appointment: ON 11/19/19.   Report called/faxed: None needed.   Local housing: Yes.

## 2019-11-17 NOTE — PLAN OF CARE
A&O x4. Afebrile, VSS. O2 sats at 96% on RA. Denies any pain/N/V/D. Still has infrequent dry cough, but reports no SOB. Received scheduled cefepime, Right forearm PIV saline locked. Up independently, voiding adequately. No BM on shift. Shower last night. Has been eating and drinking well. Received Neupogen yesterday, WBC now 4.0  & ANC 2.3.   Hoping for discharge this morning. No other complaints, continue to monitor POC.    Problem: Adult Inpatient Plan of Care  Goal: Patient-Specific Goal (Individualization)  11/17/2019 0443 by Dusty Enciso  Outcome: No Change     Problem: Adult Inpatient Plan of Care  Goal: Absence of Hospital-Acquired Illness or Injury  11/17/2019 0443 by Dusty Enciso  Outcome: No Change     Problem: Infection Risk (Fever with Neutropenia)  Goal: Absence of Infection  11/17/2019 0443 by Dusty Enciso  Outcome: No Change     Problem: Adult Inpatient Plan of Care  Goal: Plan of Care Review  11/17/2019 0443 by Dusty Enciso  Outcome: Improving     Problem: Adult Inpatient Plan of Care  Goal: Optimal Comfort and Wellbeing  11/17/2019 0443 by Dusty Enciso  Outcome: Improving

## 2019-11-17 NOTE — PROGRESS NOTES
BMT Progress Note    ID: Angel Yanez is a 58 yo man D+184 s/p 2nd autologous transplant for MM. S/p 2 recent readmissions for FUO (10/16-10/23; 9/23-9/29/19).     HPI:  Stable. Breathing okay. Still some SOB with exertion and cough. Both skin and lymph node bx site okay. Discharge today.    ROS: Negative except as stated above.    Physical Exam  /72 (BP Location: Left arm)   Pulse 73   Temp 97.5  F (36.4  C) (Axillary)   Resp 18   Wt 78.7 kg (173 lb 8 oz)   SpO2 95%   BMI 24.34 kg/m       Constitutional:  NAD.   HEENT: MMM, EOM intact, sclerae clear and anicteric  Heart: Not tachy today, RRR, no murmurs appreciated  Resp:  +bibasilar lung field crackles stable.  Abd: Soft, non-tender, BS+  Lymph: No peripheral edema  Skin: +erythematous maculopapular rash noted on back. +somewhat acne like. Right axilla incision without signs of infection or bleeding this morning. +skin bx site c/d/i  Neuro: No focal deficits  Peripheral IV x2, one in each wrist    Labs  Lab Results   Component Value Date    WBC 4.0 11/17/2019    ANEU 2.3 11/17/2019    HGB 8.3 (L) 11/17/2019    HCT 25.9 (L) 11/17/2019    PLT 22 (LL) 11/17/2019     11/17/2019    POTASSIUM 3.9 11/17/2019    CHLORIDE 110 (H) 11/17/2019    CO2 27 11/17/2019    GLC 92 11/17/2019    BUN 15 11/17/2019    CR 0.62 (L) 11/17/2019    MAG 2.2 11/06/2019    INR 1.17 (H) 11/15/2019    BILITOTAL 0.8 11/10/2019    AST 10 11/10/2019    ALT 17 11/10/2019    ALKPHOS 101 11/10/2019    PROTTOTAL 6.0 (L) 11/10/2019    ALBUMIN 2.6 (L) 11/10/2019     A/P:  Angel Yanez is a 58 yo man D+184 s/p 2nd autologous transplant for MM. S/p 2 recent readmissions for FUO (10/16-10/23; 9/23-9/29/19).      1.  BMT/MM:   Slow engraftment; cell dose was only 0.639x10^6. ([facility] [place] - known poor cell dose as failed chemo-mobilization 1/2019.)   - Stringent CR.  - day +180 bone marrow biopsy 11/6 which showed no morphologic or immunophenotypic evidence of plasma cell neoplasm.    - PET in 8/2019 clear.      2.  HEME: Keep Hgb>8g/dL, plt >20 (epistaxis requiring rhinorocket packing in last admission).   - anemic, thrombocytopenic post BMT, low cell dose, +/- infection.   - intermittent neutropenia: GCSF last on 11/16; give prn ANC </=1000.   - No evidence of HLH on lymphnode bx 10/18/19.     - Persistent thrombocytopenia: BM 11/6/19 showed adequate granulopoiesis and erythropoiesis but his platelet precursors were 0 to absent which is reflected in peripheral CBC. Started promactic 50mcg daily on 11/10. Increased to 100 mg on 11/14 and pending results go to max dose in a few days. 11/15 Holding promacta, 5% chance of causing rash. PA is approved. See incomplete note from Alejandra Jenkins 11/14. Needs to be filled at the speciality pharmacy. continue to hold until skin bx back.   - Hemolysis eval neg.   - prolonged INR. Improved with Vit K.     3.  ID: FUO: Afebrile x 72 hrs on celebrex. Will need to stop/taper.  A.) Extensive ID work up unremarkable x 2 sept and oct. 10/16 and 11/9 repeat chest CT: persistent mediastinal adenopathy no paranchymal disease. S/P bx of L axillary node on 10/18: No evidence of HLH, tissues looks reactive.   - ID consulted 11/10, appreciate recs. Extensive ID testing negative but also lots still pending. Following cx results daily.   - Consulted general surgery for excisional lymph node bx (neg core bx 10/18 L axillary). Would like R axillary node (please send for pathology, leukemia/lymphoma, Gram stain, aerobic cx, anaerobic cx, fungal cx, AFB smear and cx, and send out to [facility] for next generation bacterial, mycobacterial, fungal PCR.   -Received prophy Ancef pre-op.  - HTLV neg with very low lymphocyte count. CD4 count 146.   - Per ID with negative w/u and not neutropenic discontinued cefepime 11/12. 11/15 CT Chest new GGO. Resumed cefepime cx's pending. RVP neg. Pulm consulted. No indication for BAL or further treatment/testing. Will discontinue  cefepime on discharge and complete a zpak.   - Per ID, possible histoplasmosis: empiric itraconazole 200 TID started last admission with fevers initially resolved but now recurred; currently on bid dosing. Pt declining recommened ID follow-up in December. 10/18 Histo/blasto urine antigen negative. Per Dr Kelley flucaonzole can suppress growth? 10/18 [place] DNA test- negative. 1/12 itra level=1.6.    - prophy ACV, Pentamidine (last 10/17/19). Plan on giving pentamidine in clinic on 11/19/2019    B.) Autoimmune workup so far negative. Rheum consult, last note on 11/14: feels no evidence of autoimmune disease flair neg studies above but recommended evaluate possible granulomatous finding with lymph nose biopsy to r/o sarcoidosis in the setting of FUO and elevated soluble IL-2 RA--cx's and path pending. No surgery f/u needed. Site has glue.     C.) Malignancy W/U: Neg BM BX 11/6. Neg PET in 8/2019. Neg core lymph node needle bx 10/18. PSA neg. Admit CT with R kidney hypoenhancing ill-defined focus. MRI 11/13 not suggestive of urothelial or renal cell carcinoma.       4. Pulm:   - tachypnea/SOB: mainly with fevers. On RA. Imaging w/ contrast neg. Crackles and fluid up have been giving PRN lasix. CT Chest 11/15 new GGO and small effusions. Pulm consult as above. RVP neg. Cx pending. Discharging on zpak.   - 10/17: Echo with preserved EF, no evidence of right heart strain. Repeat echo 11/14 stable.  - 10/17 VQ scan neg for PE.     5. GI:    - 11/10/2019: LFTs are wnl     5.  FEN/Renal:   - Lyte replacement per scheduled protocol.        6.  Mood: Continue Paxil. Told colleague yesterday: having lots of regret about stem cell transplant with low stem cell dose.  JANELL discussed this with patient, see note on 11/14/2019    7. Derm:   - Rash noted on back and now chest. Non-bothersome. Derm consult. Likely heat rash. TCM cream. Derm bx pending.    Discharge today with clinic f/u on Tuesday with possible transfusions.     Cassie  KAMALA Ferrari  #[id number]      The patient was seen and examined by me separately from the midlevel provider. The note reflects our mutual assessment and plans and were approved by me personally.   I personally reviewed today's lab results vital signs and radiology results.    Each point of the assessment and plan were reviewed by the midlevel and me and either endorsed by me or were my added decisions.    My pertinent physical findings today are: Febrile, clear lungs and rash on shoulders.anf upper trunk    My assessment and plan are: 58 yo 180 day post second auto PBSC for myeloma admitted for w/u FUO. CT CAP -. On itraconozole for possible  Histoplasmosis. Do connective tissue disease autoimmune w/u (R/O rheumatoid arthritis, SLE ans also Lyme disease.) Consult ID. Schedule celebrex for comfort. Consult rheumatology. Increase celebres to 100 mg po BID. Check PSA. Appreciate  ID and rheum recommendation for extensive w/u: will implement. Lymph node biopsy result pending. MRI kidney:small mass not suggestive of cancer. CXR, check RVP.   Cardia echo for new pleural effusions. Desating: repeat lung CT. Hold promacta for rash. Lung CT shows new GGO. Consult pulmonary for possible bronch. G-CSF for low ANC. Ready for discharge that I spent 40 min preparing today.    Stephan Godwin M.D.

## 2019-11-17 NOTE — PLAN OF CARE
/75 (BP Location: Left arm)   Pulse 89   Temp 98.4  F (36.9  C) (Axillary)   Resp 18   Wt 78.7 kg (173 lb 8 oz)   SpO2 97%   BMI 24.34 kg/m  . PIV are removed piror to discharge to home. Patient is A & O x 4. No c/o pain or n/v during this shift. Patient is eating and drinking well. Discharge instructions were explained to the patient and he verbalized understanding of the teaching. Discharge medications and follow up appointment were given to the patient. Patient is waiting for his wife to pick him up very soon. Patient independently and walks on the hallways. Patient still has dry cough when talking. Patient received platelets and tolerated. Continue with plan of care and notify MD for status changes.    Problem: Adult Inpatient Plan of Care  Goal: Plan of Care Review  11/17/2019 1103 by Catie Morales RN  Outcome: Adequate for Discharge     Problem: Adult Inpatient Plan of Care  Goal: Patient-Specific Goal (Individualization)  11/17/2019 1103 by Catie Morales RN  Outcome: Adequate for Discharge     Problem: Adult Inpatient Plan of Care  Goal: Absence of Hospital-Acquired Illness or Injury  11/17/2019 1103 by Catie Morales RN  Outcome: Adequate for Discharge     Problem: Adult Inpatient Plan of Care  Goal: Optimal Comfort and Wellbeing  11/17/2019 1103 by Catie Morales RN  Outcome: Adequate for Discharge     Problem: Adult Inpatient Plan of Care  Goal: Readiness for Transition of Care  Outcome: Adequate for Discharge     Problem: Fever (Fever with Neutropenia)  Goal: Baseline Body Temperature  11/17/2019 1103 by Catie Morales RN  Outcome: Adequate for Discharge     Problem: Infection Risk (Fever with Neutropenia)  Goal: Absence of Infection  11/17/2019 1103 by Catie Morales RN  Outcome: Adequate for Discharge

## 2019-11-18 ENCOUNTER — PATIENT OUTREACH (OUTPATIENT)
Dept: SURGERY | Facility: CLINIC | Age: 59
End: 2019-11-18

## 2019-11-18 LAB
1,3 BETA GLUCAN SER-MCNC: <31 PG/ML
ABO + RH BLD: NORMAL
ABO + RH BLD: NORMAL
ACID FAST STN SPEC QL: NORMAL
B-D GLUCAN INTERPRETATION (1,3): NEGATIVE
BACTERIA SPEC CULT: NO GROWTH
BACTERIA SPEC CULT: NORMAL
BLD GP AB SCN SERPL QL: NORMAL
BLD PROD TYP BPU: NORMAL
BLOOD BANK CMNT PATIENT-IMP: NORMAL
Lab: NORMAL
NUM BPU REQUESTED: 2
SPECIMEN EXP DATE BLD: NORMAL
SPECIMEN SOURCE: NORMAL

## 2019-11-18 NOTE — TELEPHONE ENCOUNTER
Post hospital discharge call not placed.  Patient has an appointment scheduled with BMT tomorrow, 11/19.  Pathology results will be followed by that team.

## 2019-11-19 ENCOUNTER — OFFICE VISIT (OUTPATIENT)
Dept: TRANSPLANT | Facility: CLINIC | Age: 59
End: 2019-11-19
Attending: INTERNAL MEDICINE
Payer: COMMERCIAL

## 2019-11-19 ENCOUNTER — APPOINTMENT (OUTPATIENT)
Dept: LAB | Facility: CLINIC | Age: 59
End: 2019-11-19
Attending: INTERNAL MEDICINE
Payer: COMMERCIAL

## 2019-11-19 VITALS
RESPIRATION RATE: 20 BRPM | WEIGHT: 174.6 LBS | TEMPERATURE: 98.3 F | SYSTOLIC BLOOD PRESSURE: 150 MMHG | HEART RATE: 75 BPM | BODY MASS INDEX: 24.49 KG/M2 | DIASTOLIC BLOOD PRESSURE: 83 MMHG | OXYGEN SATURATION: 98 %

## 2019-11-19 DIAGNOSIS — C90.01 MULTIPLE MYELOMA IN REMISSION (H): ICD-10-CM

## 2019-11-19 DIAGNOSIS — C90.00 MULTIPLE MYELOMA NOT HAVING ACHIEVED REMISSION (H): ICD-10-CM

## 2019-11-19 DIAGNOSIS — L40.50 PSORIASIS WITH ARTHROPATHY (H): Primary | ICD-10-CM

## 2019-11-19 DIAGNOSIS — Z94.81 STATUS POST BONE MARROW TRANSPLANT (H): ICD-10-CM

## 2019-11-19 LAB
ALBUMIN SERPL-MCNC: 3 G/DL (ref 3.4–5)
ALP SERPL-CCNC: 145 U/L (ref 40–150)
ALT SERPL W P-5'-P-CCNC: 35 U/L (ref 0–70)
ANION GAP SERPL CALCULATED.3IONS-SCNC: 4 MMOL/L (ref 3–14)
ANISOCYTOSIS BLD QL SMEAR: SLIGHT
AST SERPL W P-5'-P-CCNC: 17 U/L (ref 0–45)
BACTERIA SPEC CULT: ABNORMAL
BACTERIA SPEC CULT: ABNORMAL
BACTERIA SPEC CULT: NO GROWTH
BACTERIA SPEC CULT: NO GROWTH
BASOPHILS # BLD AUTO: 0 10E9/L (ref 0–0.2)
BASOPHILS NFR BLD AUTO: 0 %
BILIRUB SERPL-MCNC: 0.7 MG/DL (ref 0.2–1.3)
BLD PROD TYP BPU: NORMAL
BLD UNIT ID BPU: 0
BLOOD PRODUCT CODE: NORMAL
BPU ID: NORMAL
BUN SERPL-MCNC: 18 MG/DL (ref 7–30)
CALCIUM SERPL-MCNC: 8.4 MG/DL (ref 8.5–10.1)
CHLORIDE SERPL-SCNC: 111 MMOL/L (ref 94–109)
CO2 SERPL-SCNC: 27 MMOL/L (ref 20–32)
CREAT SERPL-MCNC: 0.6 MG/DL (ref 0.66–1.25)
DIFFERENTIAL METHOD BLD: ABNORMAL
EOSINOPHIL # BLD AUTO: 0.3 10E9/L (ref 0–0.7)
EOSINOPHIL NFR BLD AUTO: 11.3 %
ERYTHROCYTE [DISTWIDTH] IN BLOOD BY AUTOMATED COUNT: 20.7 % (ref 10–15)
GFR SERPL CREATININE-BSD FRML MDRD: >90 ML/MIN/{1.73_M2}
GLUCOSE SERPL-MCNC: 94 MG/DL (ref 70–99)
HCT VFR BLD AUTO: 25.1 % (ref 40–53)
HGB BLD-MCNC: 8 G/DL (ref 13.3–17.7)
LYMPHOCYTES # BLD AUTO: 0.7 10E9/L (ref 0.8–5.3)
LYMPHOCYTES NFR BLD AUTO: 26.1 %
Lab: NORMAL
Lab: NORMAL
MCH RBC QN AUTO: 31.7 PG (ref 26.5–33)
MCHC RBC AUTO-ENTMCNC: 31.9 G/DL (ref 31.5–36.5)
MCV RBC AUTO: 100 FL (ref 78–100)
MONOCYTES # BLD AUTO: 0.1 10E9/L (ref 0–1.3)
MONOCYTES NFR BLD AUTO: 5.2 %
NEUTROPHILS # BLD AUTO: 1.5 10E9/L (ref 1.6–8.3)
NEUTROPHILS NFR BLD AUTO: 57.4 %
NRBC # BLD AUTO: 0 10*3/UL
NRBC BLD AUTO-RTO: 1 /100
PLATELET # BLD AUTO: 17 10E9/L (ref 150–450)
PLATELET # BLD EST: ABNORMAL 10*3/UL
POIKILOCYTOSIS BLD QL SMEAR: SLIGHT
POTASSIUM SERPL-SCNC: 4.1 MMOL/L (ref 3.4–5.3)
PROT SERPL-MCNC: 6.2 G/DL (ref 6.8–8.8)
RBC # BLD AUTO: 2.52 10E12/L (ref 4.4–5.9)
SODIUM SERPL-SCNC: 142 MMOL/L (ref 133–144)
SPECIMEN SOURCE: ABNORMAL
SPECIMEN SOURCE: NORMAL
SPECIMEN SOURCE: NORMAL
TRANSFUSION STATUS PATIENT QL: NORMAL
WBC # BLD AUTO: 2.7 10E9/L (ref 4–11)

## 2019-11-19 PROCEDURE — 40000268 ZZH STATISTIC NO CHARGES: Mod: ZF

## 2019-11-19 PROCEDURE — 86900 BLOOD TYPING SEROLOGIC ABO: CPT | Performed by: PHYSICIAN ASSISTANT

## 2019-11-19 PROCEDURE — 25000125 ZZHC RX 250: Mod: ZF | Performed by: PHYSICIAN ASSISTANT

## 2019-11-19 PROCEDURE — 80053 COMPREHEN METABOLIC PANEL: CPT | Performed by: PHYSICIAN ASSISTANT

## 2019-11-19 PROCEDURE — G0463 HOSPITAL OUTPT CLINIC VISIT: HCPCS | Mod: 25

## 2019-11-19 PROCEDURE — 86923 COMPATIBILITY TEST ELECTRIC: CPT | Performed by: PHYSICIAN ASSISTANT

## 2019-11-19 PROCEDURE — 86850 RBC ANTIBODY SCREEN: CPT | Performed by: PHYSICIAN ASSISTANT

## 2019-11-19 PROCEDURE — 85025 COMPLETE CBC W/AUTO DIFF WBC: CPT | Performed by: PHYSICIAN ASSISTANT

## 2019-11-19 PROCEDURE — 86901 BLOOD TYPING SEROLOGIC RH(D): CPT | Performed by: PHYSICIAN ASSISTANT

## 2019-11-19 PROCEDURE — 36415 COLL VENOUS BLD VENIPUNCTURE: CPT

## 2019-11-19 PROCEDURE — 94642 AEROSOL INHALATION TREATMENT: CPT

## 2019-11-19 PROCEDURE — 87799 DETECT AGENT NOS DNA QUANT: CPT | Performed by: PHYSICIAN ASSISTANT

## 2019-11-19 RX ORDER — ALBUTEROL SULFATE 0.83 MG/ML
2.5 SOLUTION RESPIRATORY (INHALATION)
Status: DISCONTINUED | OUTPATIENT
Start: 2019-11-19 | End: 2019-11-19 | Stop reason: HOSPADM

## 2019-11-19 RX ORDER — PENTAMIDINE ISETHIONATE 300 MG/300MG
300 INHALANT RESPIRATORY (INHALATION)
Status: CANCELLED
Start: 2019-12-18

## 2019-11-19 RX ORDER — ALBUTEROL SULFATE 5 MG/ML
2.5 SOLUTION RESPIRATORY (INHALATION)
Status: CANCELLED
Start: 2019-12-18

## 2019-11-19 RX ORDER — HEPARIN SODIUM,PORCINE 10 UNIT/ML
5 VIAL (ML) INTRAVENOUS
Status: CANCELLED | OUTPATIENT
Start: 2019-12-18

## 2019-11-19 RX ADMIN — ALBUTEROL SULFATE 2.5 MG: 2.5 SOLUTION RESPIRATORY (INHALATION) at 13:38

## 2019-11-19 RX ADMIN — PENTAMIDINE ISETHIONATE 300 MG: 300 INJECTION, POWDER, LYOPHILIZED, FOR SOLUTION INTRAMUSCULAR; INTRAVENOUS at 13:54

## 2019-11-19 ASSESSMENT — PAIN SCALES - GENERAL: PAINLEVEL: NO PAIN (0)

## 2019-11-19 NOTE — LETTER
11/19/2019      RE: Angel Yanez  [address]  [facility] [address]       BP (!) 150/83 (BP Location: Right arm, Patient Position: Sitting)   Pulse 75   Temp 98.3  F (36.8  C) (Oral)   Resp 20   Wt 79.2 kg (174 lb 9.6 oz)   SpO2 98%   BMI 24.49 kg/m     Wt Readings from Last 4 Encounters:   11/19/19 79.2 kg (174 lb 9.6 oz)   11/17/19 78.7 kg (173 lb 8 oz)   11/09/19 77 kg (169 lb 11.2 oz)   11/08/19 76.8 kg (169 lb 4.8 oz)     Guille returns for follow-up 6 months after his autologous transplant for myeloma after a long prior remission from his first transplant.  He has had several brief hospitalizations for an unexplained high fever with very extensive work-up identifying no specific etiology.  A lymph node biopsy from several days ago still is pending pathology but a skin biopsy showed spongiotic dermatitis, more likely a drug rash than an inflammatory process. That final report is pending as well.    His marrow function is still sluggish needing red cells ~every 10 days, a recently measured low reticulocyte count and platelets between 15 and 25,000 in recent weeks.  He has needed G-CSF for ANC of only 1000 once in the past 10 days..    He reports no external bleeding bruising GI  or respiratory symptomatology.  His right axillary node biopsy site is a little sore but improving; and he thinks he has no ongoing rash.    His exam shows his weight up 3 kg over 10 days.  He has a trace ankle edema and a few lower extremity petechiae but no petechiae in his mouth or conjunctiva.  His blood pressure was a bit elevated on arrival.  He had no inflammatory skin rash, just fading spots on his trunk.  His lungs were clear his heart tones are regular without a gallop. His abdomen is soft without focal tenderness or hepatosplenomegaly.    Review of his extensive recent laboratory testing essentially identified no clear etiology for his fevers.  He is now taking Celebrex 100 mg twice daily which is controlled  his temperature and that is wise to continue.  He is also recently started eltrombopag to see if it can stimulate hematopoiesis, particularly thrombopoiesis.    His bone marrow biopsy also showed no signs of myeloma, his light chains were not elevated and he had no monoclonal protein in the serum.  Thus he is in clinical remission.    He will return in 2 days time for possible red cells and possible platelets.  It also seems reasonable that he get a flu shot on his return visit in 2 days, after platelets if he needs them.    His questions were answered in full.  Adebayo Lucio MD    Professor of medicine    Results for SAMEER KNIGHT (MRN [id number]) as of 11/19/2019 21:10   Ref. Range 11/17/2019 04:23 11/17/2019 04:23 11/17/2019 07:26 11/17/2019 07:26 11/19/2019 00:59 11/19/2019 12:36   Sodium Latest Ref Range: 133 - 144 mmol/L 142     142   Potassium Latest Ref Range: 3.4 - 5.3 mmol/L 3.9     4.1   Chloride Latest Ref Range: 94 - 109 mmol/L 110 (H)     111 (H)   Carbon Dioxide Latest Ref Range: 20 - 32 mmol/L 27     27   Urea Nitrogen Latest Ref Range: 7 - 30 mg/dL 15     18   Creatinine Latest Ref Range: 0.66 - 1.25 mg/dL 0.62 (L)     0.60 (L)   GFR Estimate Latest Ref Range: >60 mL/min/1.73_m2 >90     >90   GFR Estimate If Black Latest Ref Range: >60 mL/min/1.73_m2 >90     >90   Calcium Latest Ref Range: 8.5 - 10.1 mg/dL 8.2 (L)     8.4 (L)   Anion Gap Latest Ref Range: 3 - 14 mmol/L 5     4   Albumin Latest Ref Range: 3.4 - 5.0 g/dL      3.0 (L)   Protein Total Latest Ref Range: 6.8 - 8.8 g/dL      6.2 (L)   Bilirubin Total Latest Ref Range: 0.2 - 1.3 mg/dL      0.7   Alkaline Phosphatase Latest Ref Range: 40 - 150 U/L      145   ALT Latest Ref Range: 0 - 70 U/L      35   AST Latest Ref Range: 0 - 45 U/L      17   Glucose Latest Ref Range: 70 - 99 mg/dL 92     94   WBC Latest Ref Range: 4.0 - 11.0 10e9/L 4.0     2.7 (L)   Hemoglobin Latest Ref Range: 13.3 - 17.7 g/dL 8.3 (L)     8.0 (L)   Hematocrit Latest Ref  Range: 40.0 - 53.0 % 25.9 (L)     25.1 (L)   Platelet Count Latest Ref Range: 150 - 450 10e9/L 22 (LL)     17 (LL)   RBC Count Latest Ref Range: 4.4 - 5.9 10e12/L 2.64 (L)     2.52 (L)   MCV Latest Ref Range: 78 - 100 fl 98     100   MCH Latest Ref Range: 26.5 - 33.0 pg 31.4     31.7   MCHC Latest Ref Range: 31.5 - 36.5 g/dL 32.0     31.9   RDW Latest Ref Range: 10.0 - 15.0 % 21.2 (H)     20.7 (H)   Diff Method Unknown Manual Differential     Manual Differential   % Neutrophils Latest Units: % 57.5     57.4   % Lymphocytes Latest Units: % 16.8     26.1   % Monocytes Latest Units: % 6.2     5.2   % Eosinophils Latest Units: % 19.5     11.3   % Basophils Latest Units: % 0.0     0.0   Nucleated RBCs Latest Ref Range: 0 /100      1 (H)   Absolute Neutrophil Latest Ref Range: 1.6 - 8.3 10e9/L 2.3     1.5 (L)   Absolute Lymphocytes Latest Ref Range: 0.8 - 5.3 10e9/L 0.7 (L)     0.7 (L)   Absolute Monocytes Latest Ref Range: 0.0 - 1.3 10e9/L 0.2     0.1   Absolute Eosinophils Latest Ref Range: 0.0 - 0.7 10e9/L 0.8 (H)     0.3   Absolute Basophils Latest Ref Range: 0.0 - 0.2 10e9/L 0.0     0.0   Absolute Nucleated RBC Unknown      0.0   Anisocytosis Unknown Moderate     Slight   Poikilocytosis Unknown Slight     Slight   Ovalocytes Unknown Slight        Platelet Estimate Unknown Confirming automated cell count     Confirming automated cell count   ABO Unknown O     O   RH(D) Unknown Pos     Pos   Antibody Screen Unknown Neg     Neg   Test Valid Only At Latest Units:     [facility]   Specimen Expires Unknown 11/20/2019 11/22/2019   Blood Component Type Unknown Red Blood Cells LeukoReduced Irradiated Red Blood Cells LeukoReduced Irradiated PLT Pheresis PlateletPheresis,LeukoRed Irrad (Part 2) PlateletPheresis,LeukoRed Irrad (Part 2)    Unit Number Unknown [id number]  [id number]     Division Number Unknown 00 00 00  00    Status of Unit Unknown No longer available 11/19/2019 1456 No longer available 11/19/2019 1950 Released to care unit  No longer available 11/19/2019 1846    Crossmatch Unknown Red Blood Cells        Unit Status Unknown EXP/REL RET ISS  RET    Patient Name: SHIMON KNIGHT   MR#: [id number]   Specimen #: [id number]   Collected: 11/6/2019   Received: 11/6/2019   Reported: 11/7/2019 15:19   Ordering Phy(s): KAILYN SAUCEDO   Additional Phy(s): Copy to [employer]     For improved result formatting, select 'View Enhanced Report Format' under    Linked Documents section.     TEST(S):   Unilateral Bone Marrow Biopsy/Aspiration     FINAL DIAGNOSIS:   Bone marrow, posterior iliac crest, left decalcified trephine biopsy and   touch imprint; direct aspirate smear,   and concentrated aspirate smear; and peripheral blood smear:   - Predominantly subcortical biopsy; evaluable marrow has a cellularity of   10-20%; no morphologic or   immunophenotypic evidence of plasma cell neoplasm   - Peripheral blood showing marked macrocytic anemia, moderate leukopenia   with absolute neutropenia and   lymphopenia, and marked thrombocytopenia     COMMENT:   By separate report ([id number]), flow cytometric immunophenotyping   performed on marrow aspirates shows   polytypic plasma cells.     I have personally reviewed all specimens and/or slides, including the   listed special stains, and used them   with my medical judgment to determine the final diagnosis.     Electronically signed out by:   Kai Hamilton M.D.,[facility]     Technical testing/processing performed at [place], [place]     CLINICAL HISTORY:   Per [facility] medical records: 59 year-old male, with prior diagnosis of   multiple myeloma in 2004, was treated with   daratumumab and who status 173 days post second autologous stem cell   transplant. He recently suffered    bacteremia and was treated with Levaquin. Prior bone marrow biopsy   ([id number] from 8/23/19) showed rare   polytypic plasma cells. The patient received G-CSF on 10/21/2019.     Adebayo Lucio MD

## 2019-11-19 NOTE — NURSING NOTE
Albuterol was inhaled prior to Pentamidine inhalation.   Pentamidine 300 mg inhalation was started at 1:34. Patient tolerated procedure with no incident.  Yesica Duncan MA November 19, 2019

## 2019-11-19 NOTE — PROGRESS NOTES
Patient did not want RBC's today.  MD paged, copy of labs given to patient.  Coordination with clinic about upcoming appointments and pentamidine to be administered in clinic.  Clinic RN and CMA were involved in communication between myself, patient, and MD.

## 2019-11-19 NOTE — PROGRESS NOTES
BP (!) 150/83 (BP Location: Right arm, Patient Position: Sitting)   Pulse 75   Temp 98.3  F (36.8  C) (Oral)   Resp 20   Wt 79.2 kg (174 lb 9.6 oz)   SpO2 98%   BMI 24.49 kg/m    Wt Readings from Last 4 Encounters:   11/19/19 79.2 kg (174 lb 9.6 oz)   11/17/19 78.7 kg (173 lb 8 oz)   11/09/19 77 kg (169 lb 11.2 oz)   11/08/19 76.8 kg (169 lb 4.8 oz)     Guille returns for follow-up 6 months after his autologous transplant for myeloma after a long prior remission from his first transplant.  He has had several brief hospitalizations for an unexplained high fever with very extensive work-up identifying no specific etiology.  A lymph node biopsy from several days ago still is pending pathology but a skin biopsy showed spongiotic dermatitis, more likely a drug rash than an inflammatory process. That final report is pending as well.    His marrow function is still sluggish needing red cells ~every 10 days, a recently measured low reticulocyte count and platelets between 15 and 25,000 in recent weeks.  He has needed G-CSF for ANC of only 1000 once in the past 10 days..    He reports no external bleeding bruising GI  or respiratory symptomatology.  His right axillary node biopsy site is a little sore but improving; and he thinks he has no ongoing rash.    His exam shows his weight up 3 kg over 10 days.  He has a trace ankle edema and a few lower extremity petechiae but no petechiae in his mouth or conjunctiva.  His blood pressure was a bit elevated on arrival.  He had no inflammatory skin rash, just fading spots on his trunk.  His lungs were clear his heart tones are regular without a gallop. His abdomen is soft without focal tenderness or hepatosplenomegaly.    Review of his extensive recent laboratory testing essentially identified no clear etiology for his fevers.  He is now taking Celebrex 100 mg twice daily which is controlled his temperature and that is wise to continue.  He is also recently started eltrombopag  to see if it can stimulate hematopoiesis, particularly thrombopoiesis.    His bone marrow biopsy also showed no signs of myeloma, his light chains were not elevated and he had no monoclonal protein in the serum.  Thus he is in clinical remission.    He will return in 2 days time for possible red cells and possible platelets.  It also seems reasonable that he get a flu shot on his return visit in 2 days, after platelets if he needs them.    His questions were answered in full.  Adebayo Lucio MD    Professor of medicine    Results for SAMEER KNIGHT (MRN [id number]) as of 11/19/2019 21:10   Ref. Range 11/17/2019 04:23 11/17/2019 04:23 11/17/2019 07:26 11/17/2019 07:26 11/19/2019 00:59 11/19/2019 12:36   Sodium Latest Ref Range: 133 - 144 mmol/L 142     142   Potassium Latest Ref Range: 3.4 - 5.3 mmol/L 3.9     4.1   Chloride Latest Ref Range: 94 - 109 mmol/L 110 (H)     111 (H)   Carbon Dioxide Latest Ref Range: 20 - 32 mmol/L 27     27   Urea Nitrogen Latest Ref Range: 7 - 30 mg/dL 15     18   Creatinine Latest Ref Range: 0.66 - 1.25 mg/dL 0.62 (L)     0.60 (L)   GFR Estimate Latest Ref Range: >60 mL/min/1.73_m2 >90     >90   GFR Estimate If Black Latest Ref Range: >60 mL/min/1.73_m2 >90     >90   Calcium Latest Ref Range: 8.5 - 10.1 mg/dL 8.2 (L)     8.4 (L)   Anion Gap Latest Ref Range: 3 - 14 mmol/L 5     4   Albumin Latest Ref Range: 3.4 - 5.0 g/dL      3.0 (L)   Protein Total Latest Ref Range: 6.8 - 8.8 g/dL      6.2 (L)   Bilirubin Total Latest Ref Range: 0.2 - 1.3 mg/dL      0.7   Alkaline Phosphatase Latest Ref Range: 40 - 150 U/L      145   ALT Latest Ref Range: 0 - 70 U/L      35   AST Latest Ref Range: 0 - 45 U/L      17   Glucose Latest Ref Range: 70 - 99 mg/dL 92     94   WBC Latest Ref Range: 4.0 - 11.0 10e9/L 4.0     2.7 (L)   Hemoglobin Latest Ref Range: 13.3 - 17.7 g/dL 8.3 (L)     8.0 (L)   Hematocrit Latest Ref Range: 40.0 - 53.0 % 25.9 (L)     25.1 (L)   Platelet Count Latest Ref Range: 150 - 450  10e9/L 22 (LL)     17 (LL)   RBC Count Latest Ref Range: 4.4 - 5.9 10e12/L 2.64 (L)     2.52 (L)   MCV Latest Ref Range: 78 - 100 fl 98     100   MCH Latest Ref Range: 26.5 - 33.0 pg 31.4     31.7   MCHC Latest Ref Range: 31.5 - 36.5 g/dL 32.0     31.9   RDW Latest Ref Range: 10.0 - 15.0 % 21.2 (H)     20.7 (H)   Diff Method Unknown Manual Differential     Manual Differential   % Neutrophils Latest Units: % 57.5     57.4   % Lymphocytes Latest Units: % 16.8     26.1   % Monocytes Latest Units: % 6.2     5.2   % Eosinophils Latest Units: % 19.5     11.3   % Basophils Latest Units: % 0.0     0.0   Nucleated RBCs Latest Ref Range: 0 /100      1 (H)   Absolute Neutrophil Latest Ref Range: 1.6 - 8.3 10e9/L 2.3     1.5 (L)   Absolute Lymphocytes Latest Ref Range: 0.8 - 5.3 10e9/L 0.7 (L)     0.7 (L)   Absolute Monocytes Latest Ref Range: 0.0 - 1.3 10e9/L 0.2     0.1   Absolute Eosinophils Latest Ref Range: 0.0 - 0.7 10e9/L 0.8 (H)     0.3   Absolute Basophils Latest Ref Range: 0.0 - 0.2 10e9/L 0.0     0.0   Absolute Nucleated RBC Unknown      0.0   Anisocytosis Unknown Moderate     Slight   Poikilocytosis Unknown Slight     Slight   Ovalocytes Unknown Slight        Platelet Estimate Unknown Confirming automated cell count     Confirming automated cell count   ABO Unknown O     O   RH(D) Unknown Pos     Pos   Antibody Screen Unknown Neg     Neg   Test Valid Only At Latest Units:     [facility]   Specimen Expires Unknown 11/20/2019 11/22/2019   Blood Component Type Unknown Red Blood Cells LeukoReduced Irradiated Red Blood Cells LeukoReduced Irradiated PLT Pheresis PlateletPheresis,LeukoRed Irrad (Part 2) PlateletPheresis,LeukoRed Irrad (Part 2)    Unit Number Unknown [id number]  [id number]    Division Number Unknown 00 00 00  00    Status of Unit Unknown No longer available  11/19/2019 1456 No longer available 11/19/2019 1950 Released to care unit  No longer available 11/19/2019 1846    Crossmatch Unknown Red Blood Cells        Unit Status Unknown EXP/REL RET ISS  RET    Patient Name: SHIMON KNIGHT   MR#: [id number]   Specimen #: [id number]   Collected: 11/6/2019   Received: 11/6/2019   Reported: 11/7/2019 15:19   Ordering Phy(s): KAILYN SAUCEDO   Additional Phy(s): Copy to [employer]     For improved result formatting, select 'View Enhanced Report Format' under    Linked Documents section.     TEST(S):   Unilateral Bone Marrow Biopsy/Aspiration     FINAL DIAGNOSIS:   Bone marrow, posterior iliac crest, left decalcified trephine biopsy and   touch imprint; direct aspirate smear,   and concentrated aspirate smear; and peripheral blood smear:   - Predominantly subcortical biopsy; evaluable marrow has a cellularity of   10-20%; no morphologic or   immunophenotypic evidence of plasma cell neoplasm   - Peripheral blood showing marked macrocytic anemia, moderate leukopenia   with absolute neutropenia and   lymphopenia, and marked thrombocytopenia     COMMENT:   By separate report ([id number]), flow cytometric immunophenotyping   performed on marrow aspirates shows   polytypic plasma cells.     I have personally reviewed all specimens and/or slides, including the   listed special stains, and used them   with my medical judgment to determine the final diagnosis.     Electronically signed out by:   Kai Hamilton M.D.,[facility]     Technical testing/processing performed at [place], [place]     CLINICAL HISTORY:   Per [facility] medical records: 59 year-old male, with prior diagnosis of   multiple myeloma in 2004, was treated with   daratumumab and who status 173 days post second autologous stem cell   transplant. He recently suffered   bacteremia and was treated with Levaquin. Prior bone marrow biopsy   ([id number] from  8/23/19) showed rare   polytypic plasma cells. The patient received G-CSF on 10/21/2019.

## 2019-11-20 LAB
ABO + RH BLD: NORMAL
ABO + RH BLD: NORMAL
BLD GP AB SCN SERPL QL: NORMAL
BLD PROD TYP BPU: NORMAL
BLD PROD TYP BPU: NORMAL
BLOOD BANK CMNT PATIENT-IMP: NORMAL
COPATH REPORT: NORMAL
NUM BPU REQUESTED: 1
NUM BPU REQUESTED: 2
SPECIMEN EXP DATE BLD: NORMAL
SPECIMEN SOURCE: NORMAL
YEAST SPEC QL CULT: NORMAL

## 2019-11-20 NOTE — PROGRESS NOTES
BMT Daily Progress Note   11/20/2019    Patient ID:  Angel Yanze is a 59 year old male, currently day 188 of his HCT.     Diagnosis MM Multiple myeloma  HCT Type Autologous    Prep Regimen Cytoxan  Melphalan   Donor Source No data was found    GVHD Prophylaxis No  Primary BMT Provider Dr. Naveed Han returned recently for follow-up 6 months after his autologous transplant for myeloma after a long prior remission from his first transplant.  He has had several brief hospitalizations for an unexplained high fever with very extensive work-up identifying no specific etiology.  A lymph node biopsy from several days ago still is pending pathology but a skin biopsy showed spongiotic dermatitis, more likely a drug rash than an inflammatory process. That final report is pending as well.  His marrow function is still sluggish needing red cells ~every 10 days, a recently measured low reticulocyte count and platelets between 15 and 25,000 in recent weeks.  He has needed G-CSF for ANC of only 1000 once in the past 10 days. He is also recently started eltrombopag to see if it can stimulate hematopoiesis, particularly thrombopoiesis.  Review of his extensive recent laboratory testing essentially identified no clear etiology for his fevers.  He is now taking Celebrex 100 mg twice daily which is controlled his temperature and that is wise to continue.  He is also recently started eltrombopag to see if it can stimulate hematopoiesis, particularly thrombopoiesis.      INTERVAL  HISTORY   Since his last visit, no fevers/Chills/NS.  His weight is down- baseline 175 pounds.  Eating well, god appetite,mback to normal.  No respiratory symptoms, rhinorrhea, sore throat, cough.  He reports no external bleeding bruising GI  or respiratory symptomatology.    His right axillary node biopsy site is a little sore, improving  No rash.  No GI/ complains.  No bleeding or bruising.    Review of Systems: 10 point ROS negative except as  noted above    Scheduled Medications    Current Outpatient Medications   Medication     acetaminophen (TYLENOL) 500 MG tablet     acyclovir (ZOVIRAX) 800 MG tablet     azithromycin (ZITHROMAX) 250 MG tablet     calcium carbonate (CALCIUM CARBONATE) 600 MG tablet     celecoxib (CELEBREX) 100 MG capsule     Cholecalciferol (VITAMIN D3) 2000 units CAPS     itraconazole (SPORANOX) 100 MG capsule     PARoxetine (PAXIL) 20 MG tablet     pentamidine (NEBUPENT) 300 MG neb solution     triamcinolone (KENALOG) 0.1 % external cream     No current facility-administered medications for this visit.        PHYSICAL EXAM     Weight In/Out     Wt Readings from Last 3 Encounters:   11/19/19 79.2 kg (174 lb 9.6 oz)   11/17/19 78.7 kg (173 lb 8 oz)   11/09/19 77 kg (169 lb 11.2 oz)      [unfilled]       KPS:  80  BP (!) 143/85 (BP Location: Right arm, Patient Position: Sitting, Cuff Size: Adult Regular)   Pulse 78   Temp 97.2  F (36.2  C) (Oral)   Resp 16   Wt 75.8 kg (167 lb 3.2 oz)   SpO2 98%   BMI 23.45 kg/m      General: NAD   Eyes: : FARRAH, sclera anicteric   Nose/Mouth/Throat: OP clear, buccal mucosa moist, no ulcerations, but no petechiae in his mouth or conjunctiva.  Lungs: CTA bilaterally  Cardiovascular: RRR, no M/R/G   Abdominal/Rectal: +BS, soft, NT, ND, No HSM   Lymphatics: no edema  Skin: no rashes or petechiae, no inflammatory skin rash, just fading spots on his trunk,  few lower extremity petechiae   Neuro: A&O     LABS AND IMAGING - PAST 24 HOURS   No results found for this or any previous visit (from the past 24 hour(s)).    Patient Name: SHIMON KNIGHT   MR#: [id number]   Specimen #: [id number]   Collected: 11/6/2019   Received: 11/6/2019   Reported: 11/7/2019 15:19   Ordering Phy(s): KAILYN SAUCEDO   Additional Phy(s): Copy to [employer]     For improved result formatting, select 'View Enhanced Report Format' under    Linked Documents section.     TEST(S):   Unilateral Bone Marrow  Biopsy/Aspiration     FINAL DIAGNOSIS:   Bone marrow, posterior iliac crest, left decalcified trephine biopsy and   touch imprint; direct aspirate smear,   and concentrated aspirate smear; and peripheral blood smear:   - Predominantly subcortical biopsy; evaluable marrow has a cellularity of   10-20%; no morphologic or   immunophenotypic evidence of plasma cell neoplasm   - Peripheral blood showing marked macrocytic anemia, moderate leukopenia   with absolute neutropenia and   lymphopenia, and marked thrombocytopenia     COMMENT:   By separate report ([id number]), flow cytometric immunophenotyping   performed on marrow aspirates shows   polytypic plasma cells.     I have personally reviewed all specimens and/or slides, including the   listed special stains, and used them   with my medical judgment to determine the final diagnosis.     Electronically signed out by:   Kai Hamilton M.D.,[facility]     Technical testing/processing performed at [place], [place]     CLINICAL HISTORY:   Per [facility] medical records: 59 year-old male, with prior diagnosis of   multiple myeloma in 2004, was treated with   daratumumab and who status 173 days post second autologous stem cell   transplant. He recently suffered   bacteremia and was treated with Levaquin. Prior bone marrow biopsy   ([id number] from 8/23/19) showed rare   polytypic plasma cells. The patient received G-CSF on 10/21/2019.     OVERALL PLAN   Angel Yanez is a 60 yo man D+188 s/p 2nd autologous transplant for MM. S/p 2 recent readmissions for FUO (10/16-10/23; 9/23-9/29/19).      1.  BMT/MM:   Slow engraftment; cell dose was only 0.639x10^6. ([facility] - known poor cell dose as failed chemo-mobilization 1/2019.)   - Stringent CR.  - day +180 bone marrow biopsy 11/6 which showed no morphologic or immunophenotypic evidence of plasma cell neoplasm. His light chains were not elevated and he had no  monoclonal protein in the serum.  Thus he is in clinical remission.  - PET in 8/2019 clear.      2.  HEME: Keep Hgb>8g/dL, plt >20 (epistaxis requiring rhinorocket packing in last admission).   - anemic, thrombocytopenic post BMT, low cell dose, +/- infection.   - intermittent neutropenia: GCSF last on 11/16; give prn ANC </=1000.   - No evidence of HLH on lymphnode bx 10/18/19.     - Persistent thrombocytopenia: BM 11/6/19 showed adequate granulopoiesis and erythropoiesis but his platelet precursors were 0 to absent which is reflected in peripheral CBC. Started promactic 50mcg daily on 11/10. Increased to 100 mg on 11/14, Increase p[romacta to 150mg  downtrensding despite 100mg for 7 days, discussed with Pharm D  - Hemolysis eval neg, 11/21 retic pending  - 11/21: hgb increased, ANC pending, platelets today 10K, will give platelets      3.  ID: FUO: Afebrile x 72 hrs on celebrex. Will need to stop/taper.  A.) Extensive ID work up unremarkable x 2 sept and oct. 10/16 and 11/9 repeat chest CT: persistent mediastinal adenopathy no paranchymal disease. S/P bx of L axillary node on 10/18: No evidence of HLH, tissues looks reactive.   - ID consulted 11/10, appreciate recs. Extensive ID testing negative but also lots still pending. Following cx results daily.   - Consulted general surgery for excisional lymph node bx (neg core bx 10/18 L axillary). Would like R axillary node (please send for pathology, leukemia/lymphoma, Gram stain, aerobic cx, anaerobic cx, fungal cx, AFB smear and cx, and send out to [facility] for next generation bacterial, mycobacterial, fungal PCR.   -Received prophy Ancef pre-op.  - HTLV neg with very low lymphocyte count. CD4 count 146.   - Per ID with negative w/u and not neutropenic discontinued cefepime 11/12. 11/15 CT Chest new GGO. Resumed cefepime cx's pending. RVP neg. Pulm consulted. No indication for BAL or further treatment/testing. Discharge and complete a zpak.   - Per ID,  possible histoplasmosis: empiric itraconazole 200 TID started last admission with fevers initially resolved but now recurred; currently on bid dosing. Pt declining recommened ID follow-up in December. 10/18 Histo/blasto urine antigen negative. Per Dr Kelley flucaonzole can suppress growth? 10/18 Kairus DNA test- negative. 1/12 itra level=1.6.    - prophy ACV, itraconazole, Pentamidine (last 10/17/19 and 11/19/2019)     B.) Autoimmune workup so far negative. Rheum consult, last note on 11/14: feels no evidence of autoimmune disease flair neg studies above but recommended evaluate possible granulomatous finding with lymph nose biopsy to r/o sarcoidosis in the setting of FUO and elevated soluble IL-2 RA--cx's and path pending.      C.) Malignancy W/U: Neg BM BX 11/6. Neg PET in 8/2019. Neg core lymph node needle bx 10/18. PSA neg. Admit CT with R kidney hypoenhancing ill-defined focus. MRI 11/13 not suggestive of urothelial or renal cell carcinoma.       4. Pulm:   - tachypnea/SOB: mainly with fevers. On RA. Imaging w/ contrast neg. Crackles and fluid up have been giving PRN lasix. CT Chest 11/15 new GGO and small effusions. Pulm consult as above. RVP neg. Cx pending. Completed Azithromycin 11/21   - 10/17: Echo with preserved EF, no evidence of right heart strain. Repeat echo 11/14 stable.  - 10/17 VQ scan neg for PE.     5. GI:    - 11/10/2019: LFTs are wnl     5.  FEN/Renal:   - Lyte replacement per scheduled protocol.        6.  Mood: Continue Paxil. Told colleague yesterday: having lots of regret about stem cell transplant with low stem cell dose.  SW discussed this with patient, see note on 11/14/2019     7. Derm:   - Rash noted on back and now chest. Non-bothersome. Derm consult. 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 Was over the torso, Now clinically resolved, never stooped Prometa or held it. Off TCM cream  now.    11/21: hgb increased, ANC pending, platelets today 10K, will give platelets     RTC Sat for possible GCSF  red cells and possible platelets.    Plts today, possible GCSF pending  Increase p[romacta to 150mg   Postpone flu shot on his return visit (raspy voice today will postpone)    Dionne Casas

## 2019-11-21 ENCOUNTER — APPOINTMENT (OUTPATIENT)
Dept: LAB | Facility: CLINIC | Age: 59
End: 2019-11-21
Attending: INTERNAL MEDICINE
Payer: COMMERCIAL

## 2019-11-21 ENCOUNTER — ONCOLOGY VISIT (OUTPATIENT)
Dept: TRANSPLANT | Facility: CLINIC | Age: 59
End: 2019-11-21
Attending: INTERNAL MEDICINE
Payer: COMMERCIAL

## 2019-11-21 VITALS
HEART RATE: 65 BPM | SYSTOLIC BLOOD PRESSURE: 152 MMHG | OXYGEN SATURATION: 96 % | TEMPERATURE: 98.4 F | DIASTOLIC BLOOD PRESSURE: 90 MMHG | RESPIRATION RATE: 18 BRPM

## 2019-11-21 VITALS
HEART RATE: 78 BPM | BODY MASS INDEX: 23.45 KG/M2 | WEIGHT: 167.2 LBS | DIASTOLIC BLOOD PRESSURE: 85 MMHG | OXYGEN SATURATION: 98 % | RESPIRATION RATE: 16 BRPM | TEMPERATURE: 97.2 F | SYSTOLIC BLOOD PRESSURE: 143 MMHG

## 2019-11-21 DIAGNOSIS — L40.50 PSORIASIS WITH ARTHROPATHY (H): ICD-10-CM

## 2019-11-21 DIAGNOSIS — C90.01 MULTIPLE MYELOMA IN REMISSION (H): Primary | ICD-10-CM

## 2019-11-21 DIAGNOSIS — Z94.81 STATUS POST BONE MARROW TRANSPLANT (H): Primary | ICD-10-CM

## 2019-11-21 LAB
ALBUMIN SERPL-MCNC: 3.3 G/DL (ref 3.4–5)
ALP SERPL-CCNC: 140 U/L (ref 40–150)
ALT SERPL W P-5'-P-CCNC: 38 U/L (ref 0–70)
ANION GAP SERPL CALCULATED.3IONS-SCNC: 6 MMOL/L (ref 3–14)
AST SERPL W P-5'-P-CCNC: 16 U/L (ref 0–45)
BASOPHILS # BLD AUTO: 0 10E9/L (ref 0–0.2)
BASOPHILS NFR BLD AUTO: 0.5 %
BILIRUB SERPL-MCNC: 0.8 MG/DL (ref 0.2–1.3)
BLD PROD TYP BPU: NORMAL
BLD UNIT ID BPU: 0
BLOOD PRODUCT CODE: NORMAL
BPU ID: NORMAL
BUN SERPL-MCNC: 14 MG/DL (ref 7–30)
CALCIUM SERPL-MCNC: 8.4 MG/DL (ref 8.5–10.1)
CHLORIDE SERPL-SCNC: 110 MMOL/L (ref 94–109)
CO2 SERPL-SCNC: 24 MMOL/L (ref 20–32)
CREAT SERPL-MCNC: 0.57 MG/DL (ref 0.66–1.25)
DIFFERENTIAL METHOD BLD: ABNORMAL
EBV DNA # SPEC NAA+PROBE: NORMAL {COPIES}/ML
EBV DNA SPEC NAA+PROBE-LOG#: NORMAL {LOG_COPIES}/ML
EOSINOPHIL # BLD AUTO: 0.4 10E9/L (ref 0–0.7)
EOSINOPHIL NFR BLD AUTO: 16 %
ERYTHROCYTE [DISTWIDTH] IN BLOOD BY AUTOMATED COUNT: 19.9 % (ref 10–15)
GFR SERPL CREATININE-BSD FRML MDRD: >90 ML/MIN/{1.73_M2}
GLUCOSE SERPL-MCNC: 106 MG/DL (ref 70–99)
HCT VFR BLD AUTO: 26.6 % (ref 40–53)
HGB BLD-MCNC: 8.8 G/DL (ref 13.3–17.7)
IMM GRANULOCYTES # BLD: 0 10E9/L (ref 0–0.4)
IMM GRANULOCYTES NFR BLD: 0 %
LYMPHOCYTES # BLD AUTO: 0.8 10E9/L (ref 0.8–5.3)
LYMPHOCYTES NFR BLD AUTO: 36.1 %
MCH RBC QN AUTO: 32 PG (ref 26.5–33)
MCHC RBC AUTO-ENTMCNC: 33.1 G/DL (ref 31.5–36.5)
MCV RBC AUTO: 97 FL (ref 78–100)
MONOCYTES # BLD AUTO: 0.3 10E9/L (ref 0–1.3)
MONOCYTES NFR BLD AUTO: 14.2 %
NEUTROPHILS # BLD AUTO: 0.7 10E9/L (ref 1.6–8.3)
NEUTROPHILS NFR BLD AUTO: 33.2 %
NRBC # BLD AUTO: 0 10*3/UL
NRBC BLD AUTO-RTO: 0 /100
PLATELET # BLD AUTO: 10 10E9/L (ref 150–450)
PLATELET # BLD EST: ABNORMAL 10*3/UL
POTASSIUM SERPL-SCNC: 3.6 MMOL/L (ref 3.4–5.3)
PROT SERPL-MCNC: 6.6 G/DL (ref 6.8–8.8)
RBC # BLD AUTO: 2.75 10E12/L (ref 4.4–5.9)
RETICS # AUTO: 25.6 10E9/L (ref 25–95)
RETICS/RBC NFR AUTO: 0.9 % (ref 0.5–2)
SODIUM SERPL-SCNC: 140 MMOL/L (ref 133–144)
TRANSFUSION STATUS PATIENT QL: NORMAL
WBC # BLD AUTO: 2.2 10E9/L (ref 4–11)

## 2019-11-21 PROCEDURE — 86901 BLOOD TYPING SEROLOGIC RH(D): CPT | Performed by: INTERNAL MEDICINE

## 2019-11-21 PROCEDURE — 86850 RBC ANTIBODY SCREEN: CPT | Performed by: INTERNAL MEDICINE

## 2019-11-21 PROCEDURE — 85045 AUTOMATED RETICULOCYTE COUNT: CPT | Performed by: INTERNAL MEDICINE

## 2019-11-21 PROCEDURE — 80053 COMPREHEN METABOLIC PANEL: CPT | Performed by: INTERNAL MEDICINE

## 2019-11-21 PROCEDURE — P9037 PLATE PHERES LEUKOREDU IRRAD: HCPCS | Performed by: PHYSICIAN ASSISTANT

## 2019-11-21 PROCEDURE — 36430 TRANSFUSION BLD/BLD COMPNT: CPT

## 2019-11-21 PROCEDURE — 96372 THER/PROPH/DIAG INJ SC/IM: CPT

## 2019-11-21 PROCEDURE — 25000128 H RX IP 250 OP 636: Mod: ZF | Performed by: STUDENT IN AN ORGANIZED HEALTH CARE EDUCATION/TRAINING PROGRAM

## 2019-11-21 PROCEDURE — 85025 COMPLETE CBC W/AUTO DIFF WBC: CPT | Performed by: INTERNAL MEDICINE

## 2019-11-21 PROCEDURE — 86900 BLOOD TYPING SEROLOGIC ABO: CPT | Performed by: INTERNAL MEDICINE

## 2019-11-21 PROCEDURE — 86923 COMPATIBILITY TEST ELECTRIC: CPT | Performed by: INTERNAL MEDICINE

## 2019-11-21 PROCEDURE — G0463 HOSPITAL OUTPT CLINIC VISIT: HCPCS | Mod: 25,ZF

## 2019-11-21 RX ORDER — ALBUTEROL SULFATE 5 MG/ML
2.5 SOLUTION RESPIRATORY (INHALATION)
Status: CANCELLED
Start: 2019-12-18

## 2019-11-21 RX ORDER — HEPARIN SODIUM,PORCINE 10 UNIT/ML
5 VIAL (ML) INTRAVENOUS
Status: CANCELLED | OUTPATIENT
Start: 2019-12-18

## 2019-11-21 RX ORDER — PENTAMIDINE ISETHIONATE 300 MG/300MG
300 INHALANT RESPIRATORY (INHALATION)
Status: CANCELLED
Start: 2019-12-18

## 2019-11-21 RX ADMIN — FILGRASTIM 480 MCG: 480 INJECTION, SOLUTION INTRAVENOUS; SUBCUTANEOUS at 14:32

## 2019-11-21 ASSESSMENT — PAIN SCALES - GENERAL: PAINLEVEL: NO PAIN (0)

## 2019-11-21 NOTE — NURSING NOTE
Chief Complaint   Patient presents with     Blood Draw     labs drawn with piv start by rn.  vs taken     RECHECK     MM~ here for provider visit     Oncology Rooming Note    Vitals: WDL  Allergies Reviewed: Yes  Medication Reviewed: Yes  Medication Refills Needed: No  Clinical Concerns: None.  Patient has no complaints of pain.  Patient has no complaints of nausea.    Marry Astorga RN

## 2019-11-21 NOTE — NURSING NOTE
Patient presents to the [facility] for filgrastim (NEUPOGEN) injection 480 mcg . Order written by Mony Pitts PA-C was completed today. Name and  were verified by patient. See MAR for medication details.Medication was given in the following site:Left Arm.Patient tolerated injection well.  Yolie Ray, JORGE LUIS 2019

## 2019-11-21 NOTE — PROGRESS NOTES
Infusion Nursing Note:  Angel Yanez presents today for platelets.    Patient seen by provider today: Yes: Dr. Max Casas   present during visit today: Not Applicable.    Note: Patient arrives for platelet transfusion, no premedications needed.  Transfused 1u platelets which patient tolerated without incident.  GCSF given by [facility].  No further replacement needs today.    Intravenous Access:  Peripheral IV placed.    Treatment Conditions:  Lab Results   Component Value Date    HGB 8.8 11/21/2019     Lab Results   Component Value Date    WBC 2.2 11/21/2019      Lab Results   Component Value Date    ANEU 0.7 11/21/2019     Lab Results   Component Value Date    PLT 10 11/21/2019      Results reviewed, labs MET treatment parameters, ok to proceed with treatment.      Post Infusion Assessment:  Patient tolerated infusion without incident.  No evidence of extravasations.  Access discontinued per protocol.       Discharge Plan:   Patient discharged in stable condition accompanied by: self.  Departure Mode: Ambulatory.  Patient aware of follow up appointments.    Isamar Tam RN, [facility]

## 2019-11-21 NOTE — NURSING NOTE
Chief Complaint   Patient presents with     Blood Draw     labs drawn with piv start by rn.  vs taken     Labs drawn with PIV start by rn.  Pt tolerated well.  VS taken and pt checked in for next appt.    Dari Florentino RN

## 2019-11-22 DIAGNOSIS — Z94.81 STATUS POST BONE MARROW TRANSPLANT (H): Primary | ICD-10-CM

## 2019-11-22 LAB
ABO + RH BLD: NORMAL
ABO + RH BLD: NORMAL
BLD GP AB SCN SERPL QL: NORMAL
BLD PROD TYP BPU: NORMAL
BLD PROD TYP BPU: NORMAL
BLOOD BANK CMNT PATIENT-IMP: NORMAL
NUM BPU REQUESTED: 1
NUM BPU REQUESTED: 2
SPECIMEN EXP DATE BLD: NORMAL

## 2019-11-23 ENCOUNTER — ONCOLOGY VISIT (OUTPATIENT)
Dept: TRANSPLANT | Facility: CLINIC | Age: 59
End: 2019-11-23
Attending: INTERNAL MEDICINE
Payer: COMMERCIAL

## 2019-11-23 VITALS
HEART RATE: 72 BPM | OXYGEN SATURATION: 99 % | RESPIRATION RATE: 16 BRPM | BODY MASS INDEX: 23.3 KG/M2 | WEIGHT: 166.1 LBS | SYSTOLIC BLOOD PRESSURE: 134 MMHG | TEMPERATURE: 97.8 F | DIASTOLIC BLOOD PRESSURE: 81 MMHG

## 2019-11-23 DIAGNOSIS — Z94.81 STATUS POST BONE MARROW TRANSPLANT (H): Primary | ICD-10-CM

## 2019-11-23 DIAGNOSIS — L40.50 PSORIASIS WITH ARTHROPATHY (H): ICD-10-CM

## 2019-11-23 LAB
ANION GAP SERPL CALCULATED.3IONS-SCNC: 4 MMOL/L (ref 3–14)
BACTERIA SPEC CULT: ABNORMAL
BACTERIA SPEC CULT: ABNORMAL
BASOPHILS # BLD AUTO: 0 10E9/L (ref 0–0.2)
BASOPHILS NFR BLD AUTO: 0.2 %
BLD PROD TYP BPU: NORMAL
BLD UNIT ID BPU: 0
BLOOD PRODUCT CODE: NORMAL
BPU ID: NORMAL
BUN SERPL-MCNC: 14 MG/DL (ref 7–30)
CALCIUM SERPL-MCNC: 8.6 MG/DL (ref 8.5–10.1)
CHLORIDE SERPL-SCNC: 110 MMOL/L (ref 94–109)
CO2 SERPL-SCNC: 27 MMOL/L (ref 20–32)
CREAT SERPL-MCNC: 0.69 MG/DL (ref 0.66–1.25)
DIFFERENTIAL METHOD BLD: ABNORMAL
EOSINOPHIL # BLD AUTO: 0.4 10E9/L (ref 0–0.7)
EOSINOPHIL NFR BLD AUTO: 6.5 %
ERYTHROCYTE [DISTWIDTH] IN BLOOD BY AUTOMATED COUNT: 20.8 % (ref 10–15)
GFR SERPL CREATININE-BSD FRML MDRD: >90 ML/MIN/{1.73_M2}
GLUCOSE SERPL-MCNC: 90 MG/DL (ref 70–99)
HCT VFR BLD AUTO: 26.4 % (ref 40–53)
HGB BLD-MCNC: 8.6 G/DL (ref 13.3–17.7)
IMM GRANULOCYTES # BLD: 0.3 10E9/L (ref 0–0.4)
IMM GRANULOCYTES NFR BLD: 4.9 %
LYMPHOCYTES # BLD AUTO: 1 10E9/L (ref 0.8–5.3)
LYMPHOCYTES NFR BLD AUTO: 17.1 %
MCH RBC QN AUTO: 31.9 PG (ref 26.5–33)
MCHC RBC AUTO-ENTMCNC: 32.6 G/DL (ref 31.5–36.5)
MCV RBC AUTO: 98 FL (ref 78–100)
MONOCYTES # BLD AUTO: 0.4 10E9/L (ref 0–1.3)
MONOCYTES NFR BLD AUTO: 7.2 %
NEUTROPHILS # BLD AUTO: 3.7 10E9/L (ref 1.6–8.3)
NEUTROPHILS NFR BLD AUTO: 64.1 %
NRBC # BLD AUTO: 0 10*3/UL
NRBC BLD AUTO-RTO: 0 /100
PLATELET # BLD AUTO: 19 10E9/L (ref 150–450)
POTASSIUM SERPL-SCNC: 4.2 MMOL/L (ref 3.4–5.3)
RBC # BLD AUTO: 2.7 10E12/L (ref 4.4–5.9)
SODIUM SERPL-SCNC: 141 MMOL/L (ref 133–144)
SPECIMEN SOURCE: ABNORMAL
TRANSFUSION STATUS PATIENT QL: NORMAL
WBC # BLD AUTO: 5.7 10E9/L (ref 4–11)

## 2019-11-23 PROCEDURE — 86901 BLOOD TYPING SEROLOGIC RH(D): CPT | Performed by: INTERNAL MEDICINE

## 2019-11-23 PROCEDURE — 88185 FLOWCYTOMETRY/TC ADD-ON: CPT | Performed by: INTERNAL MEDICINE

## 2019-11-23 PROCEDURE — G0463 HOSPITAL OUTPT CLINIC VISIT: HCPCS

## 2019-11-23 PROCEDURE — 40000268 ZZH STATISTIC NO CHARGES: Mod: ZF

## 2019-11-23 PROCEDURE — 85025 COMPLETE CBC W/AUTO DIFF WBC: CPT | Performed by: INTERNAL MEDICINE

## 2019-11-23 PROCEDURE — 80048 BASIC METABOLIC PNL TOTAL CA: CPT | Performed by: INTERNAL MEDICINE

## 2019-11-23 PROCEDURE — 86850 RBC ANTIBODY SCREEN: CPT | Performed by: INTERNAL MEDICINE

## 2019-11-23 PROCEDURE — 88184 FLOWCYTOMETRY/ TC 1 MARKER: CPT | Performed by: INTERNAL MEDICINE

## 2019-11-23 PROCEDURE — 86900 BLOOD TYPING SEROLOGIC ABO: CPT | Performed by: INTERNAL MEDICINE

## 2019-11-23 PROCEDURE — 86923 COMPATIBILITY TEST ELECTRIC: CPT | Performed by: INTERNAL MEDICINE

## 2019-11-23 PROCEDURE — 36415 COLL VENOUS BLD VENIPUNCTURE: CPT

## 2019-11-23 PROCEDURE — 40001005 ZZHCL STATISTIC FLOW >15 ABY TC 88189: Performed by: INTERNAL MEDICINE

## 2019-11-23 ASSESSMENT — PAIN SCALES - GENERAL: PAINLEVEL: NO PAIN (0)

## 2019-11-23 NOTE — NURSING NOTE
"Oncology Rooming Note    November 23, 2019 10:11 AM   Angel Yanez is a 59 year old male who presents for:    Chief Complaint   Patient presents with     Blood Draw     labs drawn by venipuncture by rn.  vital signs taken.     RECHECK     post bmt for MM here for labs and md visit      Initial Vitals: /81 (BP Location: Right arm, Patient Position: Sitting, Cuff Size: Adult Regular)   Pulse 72   Temp 97.8  F (36.6  C) (Oral)   Resp 16   Wt 75.3 kg (166 lb 1.6 oz)   SpO2 99%   BMI 23.30 kg/m   Estimated body mass index is 23.3 kg/m  as calculated from the following:    Height as of 10/29/19: 1.798 m (5' 10.8\").    Weight as of this encounter: 75.3 kg (166 lb 1.6 oz). Body surface area is 1.94 meters squared.  No Pain (0) Comment: Data Unavailable   No LMP for male patient.  Allergies reviewed: Yes  Medications reviewed: Yes    Medications: Medication refills not needed today.  Pharmacy name entered into EPIC:    [facility] - [place], [place] - [address]  [facility] - [place], [place] - [address]    Clinical concerns: none       Eloisa King RN              "

## 2019-11-23 NOTE — PROGRESS NOTES
BMT Daily Progress Note   11/22/2019    Patient ID:  Angel Yanez is a 59 year old male, currently day 188 of his HCT.     Diagnosis MM Multiple myeloma  HCT Type Autologous    Prep Regimen Cytoxan  Melphalan   Donor Source No data was found    GVHD Prophylaxis No  Primary BMT Provider Dr. Naveed Han returned recently for follow-up 6 months after his autologous transplant for myeloma after a long prior remission from his first transplant.  He has had several brief hospitalizations for an unexplained high fever with very extensive work-up identifying no specific etiology.  A lymph node biopsy from several days ago still is pending pathology but a skin biopsy showed spongiotic dermatitis, more likely a drug rash than an inflammatory process. That final report is pending as well.  His marrow function is still sluggish needing red cells ~every 10 days, a recently measured low reticulocyte count and platelets between 15 and 25,000 in recent weeks.  He has needed G-CSF for ANC of only 1000 once in the past 10 days. He is also recently started eltrombopag to see if it can stimulate hematopoiesis, particularly thrombopoiesis.  Review of his extensive recent laboratory testing essentially identified no clear etiology for his fevers.  He is now taking Celebrex 100 mg twice daily which is controlled his temperature and that is wise to continue.  He is also recently started eltrombopag to see if it can stimulate hematopoiesis, particularly thrombopoiesis.      INTERVAL  HISTORY   Since his last visit, no fevers/Chills/NS.  His weight is stable/up 2 pounds- baseline 175 pounds.  Eating well, good appetite, back to normal.  No respiratory symptoms, rhinorrhea, sore throat, cough.  He reports no external bleeding bruising GI  or respiratory symptomatology.    His right axillary node biopsy site is a little sore, improving  No rash.  No GI/ complains.  No bleeding or bruising.    Review of Systems: 10 point ROS  negative except as noted above    Scheduled Medications    Current Outpatient Medications   Medication     acetaminophen (TYLENOL) 500 MG tablet     acyclovir (ZOVIRAX) 800 MG tablet     calcium carbonate (CALCIUM CARBONATE) 600 MG tablet     celecoxib (CELEBREX) 100 MG capsule     Cholecalciferol (VITAMIN D3) 2000 units CAPS     itraconazole (SPORANOX) 100 MG capsule     PARoxetine (PAXIL) 20 MG tablet     pentamidine (NEBUPENT) 300 MG neb solution     triamcinolone (KENALOG) 0.1 % external cream     No current facility-administered medications for this visit.        PHYSICAL EXAM     Weight In/Out     Wt Readings from Last 3 Encounters:   11/21/19 75.8 kg (167 lb 3.2 oz)   11/19/19 79.2 kg (174 lb 9.6 oz)   11/17/19 78.7 kg (173 lb 8 oz)      [unfilled]       KPS:  80  /81 (BP Location: Right arm, Patient Position: Sitting, Cuff Size: Adult Regular)   Pulse 72   Temp 97.8  F (36.6  C) (Oral)   Resp 16   Wt 75.3 kg (166 lb 1.6 oz)   SpO2 99%   BMI 23.30 kg/m      General: NAD   Eyes: : FARRAH, sclera anicteric   Nose/Mouth/Throat: OP clear, buccal mucosa moist, no ulcerations, but no petechiae in his mouth or conjunctiva.  Lungs: CTA bilaterally  Cardiovascular: RRR, no M/R/G   Abdominal/Rectal: +BS, soft, NT, ND, No HSM   Lymphatics: no edema  Skin: no rashes or petechiae, no inflammatory skin rash, just fading spots on his trunk,  few lower extremity petechiae   Neuro: A&O     LABS AND IMAGING - PAST 24 HOURS     Patient Name: SHIMON KNIGHT   MR#: [id number]   Specimen #: [id number]   Collected: 11/6/2019   Received: 11/6/2019   Reported: 11/7/2019 15:19   Ordering Phy(s): KAILYN SAUCEDO   Additional Phy(s): Copy to [employer]     For improved result formatting, select 'View Enhanced Report Format' under    Linked Documents section.     TEST(S):   Unilateral Bone Marrow Biopsy/Aspiration     FINAL DIAGNOSIS:   Bone marrow, posterior iliac crest, left decalcified trephine biopsy and    touch imprint; direct aspirate smear,   and concentrated aspirate smear; and peripheral blood smear:   - Predominantly subcortical biopsy; evaluable marrow has a cellularity of   10-20%; no morphologic or   immunophenotypic evidence of plasma cell neoplasm   - Peripheral blood showing marked macrocytic anemia, moderate leukopenia   with absolute neutropenia and   lymphopenia, and marked thrombocytopenia     COMMENT:   By separate report ([id number]), flow cytometric immunophenotyping   performed on marrow aspirates shows   polytypic plasma cells.     I have personally reviewed all specimens and/or slides, including the   listed special stains, and used them   with my medical judgment to determine the final diagnosis.     Electronically signed out by:   Kai Hamilton M.D.,[facility] [[email or web address]]     Technical testing/processing performed at [place], [place]     CLINICAL HISTORY:   Per [facility] medical records: 59 year-old male, with prior diagnosis of   multiple myeloma in 2004, was treated with   daratumumab and who status 173 days post second autologous stem cell   transplant. He recently suffered   bacteremia and was treated with Levaquin. Prior bone marrow biopsy   ([id number] from 8/23/19) showed rare   polytypic plasma cells. The patient received G-CSF on 10/21/2019.     OVERALL PLAN   Angel Yanez is a 60 yo man D+188 s/p 2nd autologous transplant for MM. S/p 2 recent readmissions for FUO (10/16-10/23; 9/23-9/29/19).      1.  BMT/MM:   Slow engraftment; cell dose was only 0.639x10^6. ([facility] - known poor cell dose as failed chemo-mobilization 1/2019.)   - Stringent CR.  - day +180 bone marrow biopsy 11/6 which showed no morphologic or immunophenotypic evidence of plasma cell neoplasm. His light chains were not elevated and he had no monoclonal protein in the serum.  Thus he is in clinical remission.  - PET in 8/2019 clear.      2.  HEME: Keep  Hgb>8g/dL, plt >20 (epistaxis requiring rhinorocket packing in last admission).   - anemic, thrombocytopenic post BMT, low cell dose, +/- infection.   - intermittent neutropenia: GCSF last on 11/16; give prn ANC </=1000.   - No evidence of HLH on lymphnode bx 10/18/19.     - Persistent thrombocytopenia: BM 11/6/19 showed adequate granulopoiesis and erythropoiesis but his platelet precursors were 0 to absent which is reflected in peripheral CBC. Started promactic 50mcg daily on 11/10. Increased to 100 mg on 11/14, Increase p[romacta to 150mg  downtrensding despite 100mg for 7 days, discussed with Pharm D  - Hemolysis eval neg, 11/21 retic pending  - 11/21 platelets and GCSF, none 11/23     3.  ID: FUO: Afebrile x 72 hrs on celebrex. Will need to stop/taper.  A.) Extensive ID work up unremarkable x 2 sept and oct. 10/16 and 11/9 repeat chest CT: persistent mediastinal adenopathy no paranchymal disease. S/P bx of L axillary node on 10/18: No evidence of HLH, tissues looks reactive.   - ID consulted 11/10, appreciate recs. Extensive ID testing negative but also lots still pending. Following cx results daily.   - Consulted general surgery for excisional lymph node bx (neg core bx 10/18 L axillary). Would like R axillary node (please send for pathology, leukemia/lymphoma, Gram stain, aerobic cx, anaerobic cx, fungal cx, AFB smear and cx, and send out to [facility] for next generation bacterial, mycobacterial, fungal PCR.   -Received prophy Ancef pre-op.  - HTLV neg with very low lymphocyte count. CD4 count 146.   - Per ID with negative w/u and not neutropenic discontinued cefepime 11/12. 11/15 CT Chest new GGO. Resumed cefepime cx's pending. RVP neg. Pulm consulted. No indication for BAL or further treatment/testing. Complete a zpak.   - Per ID, possible histoplasmosis: empiric itraconazole 200 TID started last admission with fevers initially resolved but now recurred; currently on bid dosing. Pt declining  recommened ID follow-up in December. 10/18 Histo/blasto urine antigen negative. Per Dr Kelley flucaonzole can suppress growth? 10/18 Kairus DNA test- negative. 1/12 itra level=1.6.    - prophy ACV, itraconazole, Pentamidine (last 10/17/19 and 11/19/2019)     B.) Autoimmune workup so far negative. Rheum consult, last note on 11/14: feels no evidence of autoimmune disease flair neg studies above but recommended evaluate possible granulomatous finding with lymph nose biopsy to r/o sarcoidosis in the setting of FUO and elevated soluble IL-2 RA--cx's and path pending, got a call 11/22 that further send out testing are pending but suspect 2 areas with ? Plasma cells and concern for ? T-cell clonal population, less favoring infection. I asked for PB flow cytometry ordered for 11/23, TCR gene rearrangement on LN and BMB. I also asked the heme pathologist (fellow Osiris Rogers) to review the case in the context of recent BMB results and call me with any updates- I discussed with patient 11/23 that this is all non conclusive till more information is available and finalized review.     C.) Malignancy W/U: Neg BM BX 11/6. Neg PET in 8/2019. Neg core lymph node needle bx 10/18. PSA neg. Admit CT with R kidney hypoenhancing ill-defined focus. MRI 11/13 not suggestive of urothelial or renal cell carcinoma.       4. Pulm:   - tachypnea/SOB: mainly with fevers. On RA. Imaging w/ contrast neg. Crackles and fluid up have been giving PRN lasix. CT Chest 11/15 new GGO and small effusions. Pulm consult as above. RVP neg. Cx pending. Completed Azithromycin 11/21   - 10/17: Echo with preserved EF, no evidence of right heart strain. Repeat echo 11/14 stable.  - 10/17 VQ scan neg for PE.     5. GI:    - 11/10/2019: LFTs are wnl     5.  FEN/Renal:   - Lyte replacement per scheduled protocol.        6.  Mood: Continue Paxil. Told colleague yesterday: having lots of regret about stem cell transplant with low stem cell dose.  SW discussed this with  patient, see note on 11/14/2019     7. Derm:   - Rash noted on back and now chest. Non-bothersome. Derm consult. 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 Was over the torso, Now clinically resolved, never stooped Prometa or held it. Off TCM cream now.    Plan  No GCSF or transfusions  PB flow drawn 11/23    RTC   Tuesday for possible GCSF  red cells and possible platelets.    Increased promacta 11/21 to 150mg   Postpone flu shot on his next visit (raspy voice better will postpone)    Dionne Casas

## 2019-11-23 NOTE — PROGRESS NOTES
Chief Complaint   Patient presents with     Infusion     no transfusions needed today, labs within parameters

## 2019-11-24 LAB
Lab: NORMAL
SPECIMEN SOURCE: NORMAL
YEAST SPEC QL CULT: NO GROWTH

## 2019-11-25 ENCOUNTER — TELEPHONE (OUTPATIENT)
Dept: DERMATOLOGY | Facility: CLINIC | Age: 59
End: 2019-11-25

## 2019-11-25 LAB
ABO + RH BLD: NORMAL
ABO + RH BLD: NORMAL
BLD GP AB SCN SERPL QL: NORMAL
BLD PROD TYP BPU: NORMAL
BLD PROD TYP BPU: NORMAL
BLOOD BANK CMNT PATIENT-IMP: NORMAL
Lab: NORMAL
NUM BPU REQUESTED: 1
NUM BPU REQUESTED: 2
P JIROVECII DNA SPEC QL NAA+PROBE: NORMAL
SPECIMEN EXP DATE BLD: NORMAL
SPECIMEN SOURCE: NORMAL
SPECIMEN SOURCE: NORMAL
YEAST SPEC QL CULT: NO GROWTH

## 2019-11-25 NOTE — TELEPHONE ENCOUNTER
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 The patient states that the rash is now resolved.  He saw Oncology who documented that the rash had resolved.  Offered that the patient can see us in clinic should any skin issues arise.  If similar rash occurs in future can treat with triamcinolone 0.1% cream BID.    Fozia Trotter MD  Internal Medicine-Dermatology, PGY-4

## 2019-11-26 ENCOUNTER — ONCOLOGY VISIT (OUTPATIENT)
Dept: TRANSPLANT | Facility: CLINIC | Age: 59
End: 2019-11-26
Attending: INTERNAL MEDICINE
Payer: COMMERCIAL

## 2019-11-26 ENCOUNTER — APPOINTMENT (OUTPATIENT)
Dept: LAB | Facility: CLINIC | Age: 59
End: 2019-11-26
Attending: INTERNAL MEDICINE
Payer: COMMERCIAL

## 2019-11-26 VITALS
SYSTOLIC BLOOD PRESSURE: 135 MMHG | OXYGEN SATURATION: 99 % | DIASTOLIC BLOOD PRESSURE: 84 MMHG | RESPIRATION RATE: 16 BRPM | TEMPERATURE: 98.5 F

## 2019-11-26 VITALS
RESPIRATION RATE: 18 BRPM | BODY MASS INDEX: 23.34 KG/M2 | DIASTOLIC BLOOD PRESSURE: 84 MMHG | HEART RATE: 77 BPM | WEIGHT: 166.4 LBS | TEMPERATURE: 98.1 F | SYSTOLIC BLOOD PRESSURE: 128 MMHG | OXYGEN SATURATION: 98 %

## 2019-11-26 DIAGNOSIS — Z94.81 STATUS POST BONE MARROW TRANSPLANT (H): ICD-10-CM

## 2019-11-26 DIAGNOSIS — C90.00 MULTIPLE MYELOMA NOT HAVING ACHIEVED REMISSION (H): ICD-10-CM

## 2019-11-26 DIAGNOSIS — R50.9 FEVER OF UNDETERMINED ORIGIN: Primary | ICD-10-CM

## 2019-11-26 DIAGNOSIS — L40.50 PSORIASIS WITH ARTHROPATHY (H): Primary | ICD-10-CM

## 2019-11-26 LAB
ALBUMIN SERPL-MCNC: 3.5 G/DL (ref 3.4–5)
ALP SERPL-CCNC: 114 U/L (ref 40–150)
ALT SERPL W P-5'-P-CCNC: 25 U/L (ref 0–70)
ANION GAP SERPL CALCULATED.3IONS-SCNC: 3 MMOL/L (ref 3–14)
AST SERPL W P-5'-P-CCNC: 9 U/L (ref 0–45)
BASOPHILS # BLD AUTO: 0 10E9/L (ref 0–0.2)
BASOPHILS NFR BLD AUTO: 0.6 %
BILIRUB SERPL-MCNC: 0.8 MG/DL (ref 0.2–1.3)
BLD PROD TYP BPU: NORMAL
BLD UNIT ID BPU: 0
BLOOD PRODUCT CODE: NORMAL
BPU ID: NORMAL
BUN SERPL-MCNC: 17 MG/DL (ref 7–30)
CALCIUM SERPL-MCNC: 8.9 MG/DL (ref 8.5–10.1)
CHLORIDE SERPL-SCNC: 110 MMOL/L (ref 94–109)
CO2 SERPL-SCNC: 28 MMOL/L (ref 20–32)
COPATH REPORT: NORMAL
CREAT SERPL-MCNC: 0.72 MG/DL (ref 0.66–1.25)
DIFFERENTIAL METHOD BLD: ABNORMAL
EOSINOPHIL # BLD AUTO: 0.2 10E9/L (ref 0–0.7)
EOSINOPHIL NFR BLD AUTO: 12.6 %
ERYTHROCYTE [DISTWIDTH] IN BLOOD BY AUTOMATED COUNT: 20.9 % (ref 10–15)
GFR SERPL CREATININE-BSD FRML MDRD: >90 ML/MIN/{1.73_M2}
GLUCOSE SERPL-MCNC: 82 MG/DL (ref 70–99)
HCT VFR BLD AUTO: 26.7 % (ref 40–53)
HGB BLD-MCNC: 8.7 G/DL (ref 13.3–17.7)
IMM GRANULOCYTES # BLD: 0 10E9/L (ref 0–0.4)
IMM GRANULOCYTES NFR BLD: 0.6 %
INTERPRETATION ECG - MUSE: NORMAL
LYMPHOCYTES # BLD AUTO: 0.8 10E9/L (ref 0.8–5.3)
LYMPHOCYTES NFR BLD AUTO: 47.7 %
Lab: NORMAL
MAGNESIUM SERPL-MCNC: 2.1 MG/DL (ref 1.6–2.3)
MCH RBC QN AUTO: 31.9 PG (ref 26.5–33)
MCHC RBC AUTO-ENTMCNC: 32.6 G/DL (ref 31.5–36.5)
MCV RBC AUTO: 98 FL (ref 78–100)
MONOCYTES # BLD AUTO: 0.2 10E9/L (ref 0–1.3)
MONOCYTES NFR BLD AUTO: 13.2 %
NEUTROPHILS # BLD AUTO: 0.4 10E9/L (ref 1.6–8.3)
NEUTROPHILS NFR BLD AUTO: 25.3 %
NRBC # BLD AUTO: 0 10*3/UL
NRBC BLD AUTO-RTO: 0 /100
PLATELET # BLD AUTO: 13 10E9/L (ref 150–450)
PLATELET # BLD EST: ABNORMAL 10*3/UL
POTASSIUM SERPL-SCNC: 4.2 MMOL/L (ref 3.4–5.3)
PROT SERPL-MCNC: 6.8 G/DL (ref 6.8–8.8)
RBC # BLD AUTO: 2.73 10E12/L (ref 4.4–5.9)
SODIUM SERPL-SCNC: 141 MMOL/L (ref 133–144)
SPECIMEN SOURCE: NORMAL
TRANSFUSION STATUS PATIENT QL: NORMAL
WBC # BLD AUTO: 1.7 10E9/L (ref 4–11)
YEAST SPEC QL CULT: NO GROWTH

## 2019-11-26 PROCEDURE — 90686 IIV4 VACC NO PRSV 0.5 ML IM: CPT | Mod: ZF | Performed by: PHYSICIAN ASSISTANT

## 2019-11-26 PROCEDURE — 83735 ASSAY OF MAGNESIUM: CPT | Performed by: INTERNAL MEDICINE

## 2019-11-26 PROCEDURE — 25000128 H RX IP 250 OP 636: Mod: ZF | Performed by: STUDENT IN AN ORGANIZED HEALTH CARE EDUCATION/TRAINING PROGRAM

## 2019-11-26 PROCEDURE — 96372 THER/PROPH/DIAG INJ SC/IM: CPT

## 2019-11-26 PROCEDURE — 36430 TRANSFUSION BLD/BLD COMPNT: CPT

## 2019-11-26 PROCEDURE — G0008 ADMIN INFLUENZA VIRUS VAC: HCPCS

## 2019-11-26 PROCEDURE — 85025 COMPLETE CBC W/AUTO DIFF WBC: CPT | Performed by: INTERNAL MEDICINE

## 2019-11-26 PROCEDURE — 25000128 H RX IP 250 OP 636: Mod: ZF | Performed by: PHYSICIAN ASSISTANT

## 2019-11-26 PROCEDURE — 93005 ELECTROCARDIOGRAM TRACING: CPT

## 2019-11-26 PROCEDURE — 80053 COMPREHEN METABOLIC PANEL: CPT | Performed by: INTERNAL MEDICINE

## 2019-11-26 PROCEDURE — 93010 ELECTROCARDIOGRAM REPORT: CPT | Performed by: INTERNAL MEDICINE

## 2019-11-26 PROCEDURE — P9037 PLATE PHERES LEUKOREDU IRRAD: HCPCS | Performed by: PHYSICIAN ASSISTANT

## 2019-11-26 RX ORDER — ITRACONAZOLE 100 MG/1
200 CAPSULE ORAL 2 TIMES DAILY
Qty: 120 CAPSULE | Refills: 0 | Status: SHIPPED | OUTPATIENT
Start: 2019-11-26 | End: 2019-12-09

## 2019-11-26 RX ORDER — PENTAMIDINE ISETHIONATE 300 MG/300MG
300 INHALANT RESPIRATORY (INHALATION)
Status: CANCELLED
Start: 2019-12-18

## 2019-11-26 RX ORDER — ALBUTEROL SULFATE 5 MG/ML
2.5 SOLUTION RESPIRATORY (INHALATION)
Status: CANCELLED
Start: 2019-12-18

## 2019-11-26 RX ORDER — SULFAMETHOXAZOLE/TRIMETHOPRIM 800-160 MG
1 TABLET ORAL 2 TIMES DAILY
Qty: 56 TABLET | Refills: 0 | Status: ON HOLD | OUTPATIENT
Start: 2019-11-26 | End: 2019-12-12

## 2019-11-26 RX ORDER — LEVOFLOXACIN 750 MG/1
750 TABLET, FILM COATED ORAL DAILY
Qty: 30 TABLET | Refills: 0 | Status: SHIPPED | OUTPATIENT
Start: 2019-11-26 | End: 2019-12-09

## 2019-11-26 RX ORDER — HEPARIN SODIUM,PORCINE 10 UNIT/ML
5 VIAL (ML) INTRAVENOUS
Status: CANCELLED | OUTPATIENT
Start: 2019-12-18

## 2019-11-26 RX ADMIN — INFLUENZA A VIRUS A/BRISBANE/02/2018 IVR-190 (H1N1) ANTIGEN (FORMALDEHYDE INACTIVATED), INFLUENZA A VIRUS A/KANSAS/14/2017 X-327 (H3N2) ANTIGEN (FORMALDEHYDE INACTIVATED), INFLUENZA B VIRUS B/PHUKET/3073/2013 ANTIGEN (FORMALDEHYDE INACTIVATED), AND INFLUENZA B VIRUS B/MARYLAND/15/2016 BX-69A ANTIGEN (FORMALDEHYDE INACTIVATED) 0.5 ML: 15; 15; 15; 15 INJECTION, SUSPENSION INTRAMUSCULAR at 11:03

## 2019-11-26 RX ADMIN — FILGRASTIM 480 MCG: 480 INJECTION, SOLUTION INTRAVENOUS; SUBCUTANEOUS at 09:58

## 2019-11-26 ASSESSMENT — PAIN SCALES - GENERAL: PAINLEVEL: NO PAIN (0)

## 2019-11-26 NOTE — NURSING NOTE
"Oncology Rooming Note    November 26, 2019 8:35 AM   Angel Yanez is a 59 year old male who presents for:    Chief Complaint   Patient presents with     Blood Draw     VPT blood draw and vitals     RECHECK     Pt is here for a rtn for MM     Initial Vitals: Blood Pressure 128/84   Pulse 77   Temperature 98.1  F (36.7  C) (Oral)   Respiration 18   Weight 75.5 kg (166 lb 6.4 oz)   Oxygen Saturation 98%   Body Mass Index 23.34 kg/m   Estimated body mass index is 23.34 kg/m  as calculated from the following:    Height as of 10/29/19: 1.798 m (5' 10.8\").    Weight as of this encounter: 75.5 kg (166 lb 6.4 oz). Body surface area is 1.94 meters squared.  No Pain (0) Comment: Data Unavailable   No LMP for male patient.  Allergies reviewed: Yes  Medications reviewed: Yes    Medications: Medication refills not needed today.  Pharmacy name entered into EPIC:    [facility] - [place], [place] - [address] [facility] - [place], [place] - [address]    Clinical concerns: none       Shoshana Salazar MA            "

## 2019-11-26 NOTE — PROGRESS NOTES
BMT Daily Progress Note   11/26/2019    Patient ID:  Angel Yanez is a 59 year old male, currently day 193 of his HCT.     Diagnosis MM Multiple myeloma  HCT Type Autologous    Prep Regimen Cytoxan  Melphalan   Donor Source No data was found    GVHD Prophylaxis No  Primary BMT Provider Dr. Naveed Han returned recently for follow-up 6 months after his autologous transplant for myeloma after a long prior remission from his first transplant.  He has had several brief hospitalizations for an unexplained high fever with very extensive work-up identifying no specific etiology.  A lymph node biopsy from several days ago still is pending pathology but a skin biopsy showed spongiotic dermatitis, more likely a drug rash than an inflammatory process. That final report is pending as well.  His marrow function is still sluggish needing red cells ~every 10 days, a recently measured low reticulocyte count and platelets between 15 and 25,000 in recent weeks.  He has needed G-CSF for ANC of only 1000 once in the past 10 days. He is also recently started eltrombopag to see if it can stimulate hematopoiesis, particularly thrombopoiesis.  Review of his extensive recent laboratory testing essentially identified no clear etiology for his fevers.  He is now taking Celebrex 100 mg twice daily which is controlled his temperature and that is wise to continue.  He is also recently started eltrombopag to see if it can stimulate hematopoiesis, particularly thrombopoiesis.    INTERVAL  HISTORY   Since his last visit, no fevers/Chills/NS. Remains on celebrex. Weight is down despite eating well. Continues to be frustrated with low counts and minimal answers. Continues to have a raspy voice but no other URI symptoms. No bleeding, rashes, or new palpable lymphnodes.     Review of Systems:  as noted above    Scheduled Medications    Current Outpatient Medications   Medication     eltrombopag (PROMACTA) 50 MG tablet     itraconazole  (SPORANOX) 100 MG capsule     acetaminophen (TYLENOL) 500 MG tablet     acyclovir (ZOVIRAX) 800 MG tablet     calcium carbonate (CALCIUM CARBONATE) 600 MG tablet     celecoxib (CELEBREX) 100 MG capsule     Cholecalciferol (VITAMIN D3) 2000 units CAPS     PARoxetine (PAXIL) 20 MG tablet     pentamidine (NEBUPENT) 300 MG neb solution     triamcinolone (KENALOG) 0.1 % external cream     No current facility-administered medications for this visit.        PHYSICAL EXAM     Weight In/Out     Wt Readings from Last 3 Encounters:   11/26/19 75.5 kg (166 lb 6.4 oz)   11/23/19 75.3 kg (166 lb 1.6 oz)   11/21/19 75.8 kg (167 lb 3.2 oz)      [unfilled]       KPS:  80  /84   Pulse 77   Temp 98.1  F (36.7  C) (Oral)   Resp 18   Wt 75.5 kg (166 lb 6.4 oz)   SpO2 98%   BMI 23.34 kg/m      General: NAD   ENT: FARRAH, sclera anicteric, no LAD appreciated  Lungs: CTA bilaterally  Cardiovascular: RRR, no M/R/G   Abdominal/Rectal: +BS, soft, NT, ND  Lymphatics: no edema  Skin: no rashes or petechiae, no inflammatory skin rash, just fading spots on his trunk,  few lower extremity petechiae   Neuro: A&O     LABS AND IMAGING - PAST 24 HOURS     Patient Name: SHIMON KNIGHT   MR#: [id number]   Specimen #: [id number]   Collected: 11/6/2019   Received: 11/6/2019   Reported: 11/7/2019 15:19   Ordering Phy(s): KAILYN SAUCEDO   Additional Phy(s): Copy to [employer]     For improved result formatting, select 'View Enhanced Report Format' under    Linked Documents section.     TEST(S):   Unilateral Bone Marrow Biopsy/Aspiration     FINAL DIAGNOSIS:   Bone marrow, posterior iliac crest, left decalcified trephine biopsy and   touch imprint; direct aspirate smear,   and concentrated aspirate smear; and peripheral blood smear:   - Predominantly subcortical biopsy; evaluable marrow has a cellularity of   10-20%; no morphologic or   immunophenotypic evidence of plasma cell neoplasm   - Peripheral blood showing marked macrocytic  anemia, moderate leukopenia   with absolute neutropenia and   lymphopenia, and marked thrombocytopenia     COMMENT:   By separate report ([id number]), flow cytometric immunophenotyping   performed on marrow aspirates shows   polytypic plasma cells.     I have personally reviewed all specimens and/or slides, including the   listed special stains, and used them   with my medical judgment to determine the final diagnosis.     Electronically signed out by:   Kai Hamilton M.D.,[facility]     Technical testing/processing performed at [place], [place]     CLINICAL HISTORY:   Per [facility] medical records: 59 year-old male, with prior diagnosis of   multiple myeloma in 2004, was treated with   daratumumab and who status 173 days post second autologous stem cell   transplant. He recently suffered   bacteremia and was treated with Levaquin. Prior bone marrow biopsy   ([id number] from 8/23/19) showed rare   polytypic plasma cells. The patient received G-CSF on 10/21/2019.     OVERALL PLAN   Angel Yanez is a 60 yo man D+193 s/p 2nd autologous transplant for MM. S/p 2 recent readmissions for FUO (10/16-10/23; 9/23-9/29/19).      1.  BMT/MM:   Slow engraftment; cell dose was only 0.639x10^6. (Marrow Onyx - known poor cell dose as failed chemo-mobilization 1/2019.)   - Stringent CR.  - day +180 bone marrow biopsy 11/6 which showed no morphologic or immunophenotypic evidence of plasma cell neoplasm. His light chains were not elevated and he had no monoclonal protein in the serum.  Thus he is in clinical remission.  - PET in 8/2019 clear.   - PB FLOW 11/23 in process     2.  HEME: Keep Hgb>8g/dL, plt >20 (epistaxis requiring rhinorocket packing in last admission).   - Give plts today.   - anemic, thrombocytopenic post BMT, low cell dose, +/- infection.   - intermittent neutropenia: GCSF last on 11/21; give prn ANC </=1000. Give gcsf today.   - No evidence of HLH on  lymphnode bx 10/18/19.     - Persistent thrombocytopenia: BM 11/6/19 showed adequate granulopoiesis and erythropoiesis but his platelet precursors were 0 to absent which is reflected in peripheral CBC. Started promactic 50mcg daily on 11/10. Has been titrated up to 150mg daily (started 11/23).  - Hemolysis eval neg     3.  ID: FUO:  Currently Afebrile, remains on celebrex.   A.) Extensive ID work up unremarkable x 2 sept and oct. 10/16 and 11/9 repeat chest CT: persistent mediastinal adenopathy no paranchymal disease. S/P bx of L axillary node on 10/18: No evidence of HLH, tissues looks reactive.   - ID consulted 11/10, appreciate recs. Extensive ID testing negative but also lots still pending. Following cx results daily.   - Consulted general surgery for excisional lymph node bx (neg core bx 10/18 L axillary). Would like R axillary node (please send for pathology, leukemia/lymphoma, Gram stain, aerobic cx, anaerobic cx, fungal cx, AFB smear and cx, and send out to [facility] for next generation bacterial, mycobacterial, fungal PCR.   - Received prophy Ancef pre-op.  - HTLV neg with very low lymphocyte count. CD4 count 146.  - Per ID with negative w/u and not neutropenic discontinued cefepime 11/12. 11/15 CT Chest new GGO. Resumed cefepime cx's pending. RVP neg. Pulm consulted. No indication for BAL or further treatment/testing. Completed a zpak 11/21.   - Per ID, possible histoplasmosis: empiric itraconazole 200 TID started last admission with fevers initially resolved but now recurred; currently on bid dosing. Pt declining recommened ID follow-up in December. 10/18 Histo/blasto urine antigen negative. Per Dr Kelley flucaonzole can suppress growth? 10/18 Kairus DNA test- negative. 1/12 itra level=1.6.      Right LNTissue culture (11/15): positive for stenotrophomonas malophilia and Ochrobactrum anthropi from broth only. Sensitive to bactrim and levaquin. Discussed with ID 11/26.  Given culture + in  broth only, this is likely contaminant but given FUO will treat empirically.   - Start levaquin 750mg daily 11/26. Avoided bactrim given ongoing pancytopenia. Will need to monitor QTc as can interact with levaquin. Obtain EKG today 11/26.   - in 1 week, stop celebrex. If fevers return despite being on levaquin this is likely not the source of the fevers.   - Follow up with Dr. Albarado 12/3 at 2pm.      B.) Autoimmune workup so far negative. Rheum consult, last note on 11/14: feels no evidence of autoimmune disease flair neg studies above but recommended evaluate possible granulomatous finding with lymph nose biopsy to r/o sarcoidosis in the setting of FUO and elevated soluble IL-2 RA--  Per Dr. Max Casas 11/23: cx's and path pending, got a call 11/22 that further send out testing are pending but suspect 2 areas with ? Plasma cells and concern for ? T-cell clonal population, less favoring infection. I asked for PB flow cytometry ordered for 11/23, TCR gene rearrangement on LN and BMB. I also asked the heme pathologist (fellow Osiris Rogers) to review the case in the context of recent BMB results and call me with any updates- I discussed with patient 11/23 that this is all non conclusive till more information is available and finalized review.      C.) Malignancy W/U: Neg BM BX 11/6. Neg PET in 8/2019. Neg core lymph node needle bx 10/18. PSA neg. Admit CT with R kidney hypoenhancing ill-defined focus. MRI 11/13 not suggestive of urothelial or renal cell carcinoma.     Prophy: Itraconazole, ACV, pentamidine (11/19)  - Okay to get flu shot 11/26 following platelets.     4. Pulm:   - tachypnea/SOB: mainly with fevers. On RA. Imaging w/ contrast neg. Crackles and fluid up have been giving PRN lasix. CT Chest 11/15 new GGO and small effusions. Pulm consult as above. RVP neg. Cx pending. Completed Azithromycin 11/21   - 10/17: Echo with preserved EF, no evidence of right heart strain. Repeat echo 11/14 stable.  - 10/17 VQ scan  neg for PE.     5. GI:    - 11/10/2019: LFTs are wnl     5.  FEN/Renal:   - Lyte replacement per scheduled protocol.        6.  Mood: Continue Paxil. Told colleague yesterday: having lots of regret about stem cell transplant with low stem cell dose.  SW discussed this with patient, see note on 11/14/2019     7. Derm:   - Rash noted on back and now chest. Non-bothersome. Derm consult. 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 Was over the torso, Now clinically resolved, never stooped Prometa or held it. Off TCM cream now.    Plan: Start levaquin 750mg daily, gcsf, plts, flu shot,     RTC: Sunday for labs, provider visit and infusion for possible platelets. (Currently needing plts/gcsf ~q5d)  12/3 with Dr. Verena Meng, PA-C  [phone]

## 2019-11-26 NOTE — PROGRESS NOTES
Infusion Nursing Note:  Angel Yanez presents today for platelets.    Patient seen by provider today: Yes: Fei   present during visit today: Not Applicable.    Note: Patient received platelets with no premeds.  Patient was above parameter, verbal order taken from МАРИНА to give platelets today.  See CMA note for additional medication and procedures done today.  Patient offers no complaints of pain.    Intravenous Access:  Peripheral IV placed.    Treatment Conditions:  Lab Results   Component Value Date    HGB 8.7 11/26/2019     Lab Results   Component Value Date    WBC 1.7 11/26/2019      Lab Results   Component Value Date    ANEU 0.4 11/26/2019     Lab Results   Component Value Date    PLT 13 11/26/2019      Lab Results   Component Value Date     11/26/2019                   Lab Results   Component Value Date    POTASSIUM 4.2 11/26/2019           Lab Results   Component Value Date    MAG 2.1 11/26/2019            Lab Results   Component Value Date    CR 0.72 11/26/2019                   Lab Results   Component Value Date    ZHEN 8.9 11/26/2019                Lab Results   Component Value Date    BILITOTAL 0.8 11/26/2019           Lab Results   Component Value Date    ALBUMIN 3.5 11/26/2019                    Lab Results   Component Value Date    ALT 25 11/26/2019           Lab Results   Component Value Date    AST 9 11/26/2019       Results reviewed, labs MET treatment parameters, ok to proceed with treatment.      Post Infusion Assessment:  Patient tolerated infusion without incident.  Patient tolerated injection without incident.  Blood return noted pre and post infusion.  Site patent and intact, free from redness, edema or discomfort.  No evidence of extravasations.  Access discontinued per protocol.       Discharge Plan:   Discharge instructions reviewed with: Patient.  Patient and/or family verbalized understanding of discharge instructions and all questions answered.  Copy of AVS reviewed  with patient and/or family.  Patient will return 12/1 for next appointment.  Patient discharged in stable condition accompanied by: self.  Departure Mode: Ambulatory.  Face to Face time: 30.    Marry Astorga RN

## 2019-11-26 NOTE — NURSING NOTE
Stitches removed on back per RN. Patient tolerated procedure well. Skin/area looked in tact. Band-aid applied to site.

## 2019-11-27 LAB
COPATH REPORT: NORMAL
Lab: NORMAL
Lab: NORMAL
SPECIMEN SOURCE: NORMAL
SPECIMEN SOURCE: NORMAL
YEAST SPEC QL CULT: NO GROWTH
YEAST SPEC QL CULT: NO GROWTH

## 2019-11-29 ENCOUNTER — TRANSFERRED RECORDS (OUTPATIENT)
Dept: HEALTH INFORMATION MANAGEMENT | Facility: CLINIC | Age: 59
End: 2019-11-29

## 2019-11-30 LAB
BLD PROD TYP BPU: NORMAL
NUM BPU REQUESTED: 1

## 2019-12-01 ENCOUNTER — ONCOLOGY VISIT (OUTPATIENT)
Dept: TRANSPLANT | Facility: CLINIC | Age: 59
End: 2019-12-01
Attending: INTERNAL MEDICINE
Payer: COMMERCIAL

## 2019-12-01 VITALS
OXYGEN SATURATION: 100 % | SYSTOLIC BLOOD PRESSURE: 128 MMHG | BODY MASS INDEX: 23.49 KG/M2 | WEIGHT: 167.5 LBS | DIASTOLIC BLOOD PRESSURE: 78 MMHG | RESPIRATION RATE: 16 BRPM | TEMPERATURE: 98 F | HEART RATE: 78 BPM

## 2019-12-01 DIAGNOSIS — Z94.81 STATUS POST BONE MARROW TRANSPLANT (H): ICD-10-CM

## 2019-12-01 DIAGNOSIS — R50.9 FEVER OF UNDETERMINED ORIGIN: ICD-10-CM

## 2019-12-01 DIAGNOSIS — L40.50 PSORIASIS WITH ARTHROPATHY (H): Primary | ICD-10-CM

## 2019-12-01 DIAGNOSIS — C90.00 MULTIPLE MYELOMA NOT HAVING ACHIEVED REMISSION (H): Primary | ICD-10-CM

## 2019-12-01 DIAGNOSIS — L40.50 PSORIASIS WITH ARTHROPATHY (H): ICD-10-CM

## 2019-12-01 LAB
ALBUMIN SERPL-MCNC: 3.6 G/DL (ref 3.4–5)
ALP SERPL-CCNC: 103 U/L (ref 40–150)
ALT SERPL W P-5'-P-CCNC: 21 U/L (ref 0–70)
ANION GAP SERPL CALCULATED.3IONS-SCNC: 5 MMOL/L (ref 3–14)
ANISOCYTOSIS BLD QL SMEAR: SLIGHT
AST SERPL W P-5'-P-CCNC: 11 U/L (ref 0–45)
BASOPHILS # BLD AUTO: 0 10E9/L (ref 0–0.2)
BASOPHILS NFR BLD AUTO: 0 %
BILIRUB SERPL-MCNC: 0.9 MG/DL (ref 0.2–1.3)
BLD PROD TYP BPU: NORMAL
BLD UNIT ID BPU: 0
BLOOD PRODUCT CODE: NORMAL
BPU ID: NORMAL
BUN SERPL-MCNC: 14 MG/DL (ref 7–30)
CALCIUM SERPL-MCNC: 8.9 MG/DL (ref 8.5–10.1)
CHLORIDE SERPL-SCNC: 109 MMOL/L (ref 94–109)
CO2 SERPL-SCNC: 27 MMOL/L (ref 20–32)
CREAT SERPL-MCNC: 0.78 MG/DL (ref 0.66–1.25)
DIFFERENTIAL METHOD BLD: ABNORMAL
EOSINOPHIL # BLD AUTO: 0.3 10E9/L (ref 0–0.7)
EOSINOPHIL NFR BLD AUTO: 20.9 %
ERYTHROCYTE [DISTWIDTH] IN BLOOD BY AUTOMATED COUNT: 22.2 % (ref 10–15)
GFR SERPL CREATININE-BSD FRML MDRD: >90 ML/MIN/{1.73_M2}
GLUCOSE SERPL-MCNC: 95 MG/DL (ref 70–99)
HCT VFR BLD AUTO: 25.7 % (ref 40–53)
HGB BLD-MCNC: 8.4 G/DL (ref 13.3–17.7)
LYMPHOCYTES # BLD AUTO: 0.3 10E9/L (ref 0.8–5.3)
LYMPHOCYTES NFR BLD AUTO: 21.7 %
MCH RBC QN AUTO: 32.6 PG (ref 26.5–33)
MCHC RBC AUTO-ENTMCNC: 32.7 G/DL (ref 31.5–36.5)
MCV RBC AUTO: 100 FL (ref 78–100)
MONOCYTES # BLD AUTO: 0.2 10E9/L (ref 0–1.3)
MONOCYTES NFR BLD AUTO: 15.7 %
NEUTROPHILS # BLD AUTO: 0.6 10E9/L (ref 1.6–8.3)
NEUTROPHILS NFR BLD AUTO: 41.7 %
PLATELET # BLD AUTO: 20 10E9/L (ref 150–450)
PLATELET # BLD AUTO: 8 10E9/L (ref 150–450)
PLATELET # BLD EST: ABNORMAL 10*3/UL
POIKILOCYTOSIS BLD QL SMEAR: SLIGHT
POTASSIUM SERPL-SCNC: 3.6 MMOL/L (ref 3.4–5.3)
PROT SERPL-MCNC: 7.2 G/DL (ref 6.8–8.8)
RBC # BLD AUTO: 2.58 10E12/L (ref 4.4–5.9)
SODIUM SERPL-SCNC: 141 MMOL/L (ref 133–144)
TRANSFUSION STATUS PATIENT QL: NORMAL
TRANSFUSION STATUS PATIENT QL: NORMAL
WBC # BLD AUTO: 1.5 10E9/L (ref 4–11)

## 2019-12-01 PROCEDURE — 25000128 H RX IP 250 OP 636: Mod: ZF | Performed by: STUDENT IN AN ORGANIZED HEALTH CARE EDUCATION/TRAINING PROGRAM

## 2019-12-01 PROCEDURE — 86900 BLOOD TYPING SEROLOGIC ABO: CPT | Performed by: INTERNAL MEDICINE

## 2019-12-01 PROCEDURE — 80053 COMPREHEN METABOLIC PANEL: CPT | Performed by: PHYSICIAN ASSISTANT

## 2019-12-01 PROCEDURE — 96372 THER/PROPH/DIAG INJ SC/IM: CPT

## 2019-12-01 PROCEDURE — 85025 COMPLETE CBC W/AUTO DIFF WBC: CPT | Performed by: PHYSICIAN ASSISTANT

## 2019-12-01 PROCEDURE — 86923 COMPATIBILITY TEST ELECTRIC: CPT | Performed by: INTERNAL MEDICINE

## 2019-12-01 PROCEDURE — P9037 PLATE PHERES LEUKOREDU IRRAD: HCPCS | Performed by: PHYSICIAN ASSISTANT

## 2019-12-01 PROCEDURE — 86901 BLOOD TYPING SEROLOGIC RH(D): CPT | Performed by: INTERNAL MEDICINE

## 2019-12-01 PROCEDURE — 36430 TRANSFUSION BLD/BLD COMPNT: CPT

## 2019-12-01 PROCEDURE — 86850 RBC ANTIBODY SCREEN: CPT | Performed by: INTERNAL MEDICINE

## 2019-12-01 PROCEDURE — 85049 AUTOMATED PLATELET COUNT: CPT | Performed by: INTERNAL MEDICINE

## 2019-12-01 RX ORDER — PENTAMIDINE ISETHIONATE 300 MG/300MG
300 INHALANT RESPIRATORY (INHALATION)
Status: CANCELLED
Start: 2019-12-18

## 2019-12-01 RX ORDER — HEPARIN SODIUM,PORCINE 10 UNIT/ML
5 VIAL (ML) INTRAVENOUS
Status: CANCELLED | OUTPATIENT
Start: 2019-12-18

## 2019-12-01 RX ORDER — ALBUTEROL SULFATE 5 MG/ML
2.5 SOLUTION RESPIRATORY (INHALATION)
Status: CANCELLED
Start: 2019-12-18

## 2019-12-01 RX ADMIN — FILGRASTIM 480 MCG: 480 INJECTION, SOLUTION INTRAVENOUS; SUBCUTANEOUS at 09:34

## 2019-12-01 ASSESSMENT — PAIN SCALES - GENERAL: PAINLEVEL: NO PAIN (0)

## 2019-12-01 NOTE — NURSING NOTE
"Oncology Rooming Note    December 1, 2019 8:27 AM   Angel Yanez is a 59 year old male who presents for:    Chief Complaint   Patient presents with     Oncology Clinic Visit     Multiple myeloma not having achieved remission (H); labs drawn via PIV placed by Rn in lab     Initial Vitals: BP (!) 151/83 (BP Location: Left arm, Patient Position: Chair, Cuff Size: Adult Regular)   Pulse 80   Temp 97.8  F (36.6  C) (Oral)   Wt 76 kg (167 lb 8 oz)   SpO2 100%   BMI 23.49 kg/m   Estimated body mass index is 23.49 kg/m  as calculated from the following:    Height as of 10/29/19: 1.798 m (5' 10.8\").    Weight as of this encounter: 76 kg (167 lb 8 oz). Body surface area is 1.95 meters squared.  No Pain (0) Comment: Data Unavailable   No LMP for male patient.  Allergies reviewed: Yes  Medications reviewed: Yes    Medications:nrrfd paxil reflill  Pharmacy name entered into EPIC:    [facility] - [place], [place] - [address] [facility] - [place], [place] - [address]    Clinical concerns: none       Eloisa King RN              "

## 2019-12-01 NOTE — NURSING NOTE
Patient presents to the BMT Infusion for filgrastim (NEUPOGEN) injection 480 mcg. Order written by Dr. Max Casas was completed today. Name and  were verified by patient. See MAR for medication details. Patient was asked if they have any new symptoms or questions/concerns. Medication was given in the following site: right arm. Patient tolerated injection well and was discharged to home.  Tere Gonzalez CMA

## 2019-12-01 NOTE — NURSING NOTE
Chief Complaint   Patient presents with     Oncology Clinic Visit     Multiple myeloma not having achieved remission (H); labs drawn via PIV placed by Rn in lab     BP (!) 151/83 (BP Location: Left arm, Patient Position: Chair, Cuff Size: Adult Regular)   Pulse 80   Temp 97.8  F (36.6  C) (Oral)   Wt 76 kg (167 lb 8 oz)   SpO2 100%   BMI 23.49 kg/m      PIV placed left antecub by RN in lab for infusion and labs. Labs drawn and sent. Pt tolerated well.   Pt checked in for next appointment.    Maria Teresa Bourgeois, RN

## 2019-12-01 NOTE — PROGRESS NOTES
Infusion Nursing Note:  Angel Yanez presents today for platelets.    Patient seen by provider today: Yes: DOM   present during visit today: Not Applicable.    Note: Patient received Platelets today.  Post platelet count drawn. Patient has no complaints of pain.    Intravenous Access:  Peripheral IV placed.    Treatment Conditions:  Lab Results   Component Value Date    HGB 8.4 12/01/2019     Lab Results   Component Value Date    WBC 1.5 12/01/2019      Lab Results   Component Value Date    ANEU 0.6 12/01/2019     Lab Results   Component Value Date    PLT 20 12/01/2019      Lab Results   Component Value Date     12/01/2019                   Lab Results   Component Value Date    POTASSIUM 3.6 12/01/2019           Lab Results   Component Value Date    MAG 2.1 11/26/2019            Lab Results   Component Value Date    CR 0.78 12/01/2019                   Lab Results   Component Value Date    ZHEN 8.9 12/01/2019                Lab Results   Component Value Date    BILITOTAL 0.9 12/01/2019           Lab Results   Component Value Date    ALBUMIN 3.6 12/01/2019                    Lab Results   Component Value Date    ALT 21 12/01/2019           Lab Results   Component Value Date    AST 11 12/01/2019       Results reviewed, labs MET treatment parameters, ok to proceed with treatment.      Post Infusion Assessment:  Patient tolerated infusion without incident.  Blood return noted pre and post infusion.  Site patent and intact, free from redness, edema or discomfort.  No evidence of extravasations.  Access discontinued per protocol.       Discharge Plan:   Discharge instructions reviewed with: Patient.  Patient and/or family verbalized understanding of discharge instructions and all questions answered.  Copy of AVS reviewed with patient and/or family.  Patient will return 12/3 for next appointment.  Patient discharged in stable condition accompanied by: wife.  Departure Mode: Ambulatory.    Marry Astorga,  RN

## 2019-12-01 NOTE — PROGRESS NOTES
BMT Daily Progress Note   12/01/2019    Patient ID:  Angel Yanez is a 59 year old male, currently day 198 of his HCT.     Diagnosis MM Multiple myeloma  HCT Type Autologous    Prep Regimen Cytoxan  Melphalan   Donor Source No data was found    GVHD Prophylaxis No  Primary BMT Provider Dr. Naveed Han returned recently for follow-up 6 months after his autologous transplant for myeloma after a long prior remission from his first transplant.  He has had several brief hospitalizations for an unexplained high fever with very extensive work-up identifying no specific etiology.  A lymph node biopsy from several days ago still is pending pathology but a skin biopsy showed spongiotic dermatitis, more likely a drug rash than an inflammatory process. That final report is pending as well.  His marrow function is still sluggish needing red cells ~every 10 days, a recently measured low reticulocyte count and platelets between 15 and 25,000 in recent weeks.  He has needed G-CSF for ANC of only 1000 once in the past 10 days. He is also recently started eltrombopag to see if it can stimulate hematopoiesis, particularly thrombopoiesis.  Review of his extensive recent laboratory testing essentially identified no clear etiology for his fevers.  He is now taking Celebrex 100 mg twice daily which is controlled his temperature and that is wise to continue.  He is also recently started eltrombopag to see if it can stimulate hematopoiesis, particularly thrombopoiesis.    INTERVAL  HISTORY   Since his last visit, no fevers/Chills/NS.   Remains on celebrex.   Weight is up today from last time. Eating well, drinking well.  Resolutions raspy voice but no other URI symptoms.   No bleeding, rashes, or new palpable lymph nodes.   LN biopsy site well healed.    Review of Systems:  as noted above    Scheduled Medications    Current Outpatient Medications   Medication     acetaminophen (TYLENOL) 500 MG tablet     acyclovir (ZOVIRAX) 800 MG  tablet     calcium carbonate (CALCIUM CARBONATE) 600 MG tablet     celecoxib (CELEBREX) 100 MG capsule     Cholecalciferol (VITAMIN D3) 2000 units CAPS     eltrombopag (PROMACTA) 50 MG tablet     itraconazole (SPORANOX) 100 MG capsule     levofloxacin (LEVAQUIN) 750 MG tablet     PARoxetine (PAXIL) 20 MG tablet     pentamidine (NEBUPENT) 300 MG neb solution     sulfamethoxazole-trimethoprim (BACTRIM DS/SEPTRA DS) 800-160 MG tablet     triamcinolone (KENALOG) 0.1 % external cream     No current facility-administered medications for this visit.        PHYSICAL EXAM     Weight In/Out     Wt Readings from Last 3 Encounters:   12/01/19 76 kg (167 lb 8 oz)   11/26/19 75.5 kg (166 lb 6.4 oz)   11/23/19 75.3 kg (166 lb 1.6 oz)      [unfilled]       KPS:  80  BP (!) 151/83 (BP Location: Left arm, Patient Position: Chair, Cuff Size: Adult Regular)   Pulse 80   Temp 97.8  F (36.6  C) (Oral)   Wt 76 kg (167 lb 8 oz)   SpO2 100%   BMI 23.49 kg/m      General: NAD   ENT: FARRAH, sclera anicteric, no LAD appreciated  Lungs: CTA bilaterally  Cardiovascular: RRR, no M/R/G   Abdominal/Rectal: +BS, soft, NT, ND  Lymphatics: no edema  Skin: no rashes or petechiae, no inflammatory skin rash, just tan spots where the rash wason his trunk,  few lower extremity petechiae   Neuro: A&O     LABS AND IMAGING - PAST 24 HOURS     Patient Name: SHIMON KNIGHT   MR#: [id number]   Specimen #: [id number]   Collected: 11/6/2019   Received: 11/6/2019   Reported: 11/7/2019 15:19   Ordering Phy(s): KAILYN SAUCEDO   Additional Phy(s): Copy to [employer]     For improved result formatting, select 'View Enhanced Report Format' under    Linked Documents section.     TEST(S):   Unilateral Bone Marrow Biopsy/Aspiration     FINAL DIAGNOSIS:   Bone marrow, posterior iliac crest, left decalcified trephine biopsy and   touch imprint; direct aspirate smear,   and concentrated aspirate smear; and peripheral blood smear:   - Predominantly  subcortical biopsy; evaluable marrow has a cellularity of   10-20%; no morphologic or   immunophenotypic evidence of plasma cell neoplasm   - Peripheral blood showing marked macrocytic anemia, moderate leukopenia   with absolute neutropenia and   lymphopenia, and marked thrombocytopenia     COMMENT:   By separate report ([id number]), flow cytometric immunophenotyping   performed on marrow aspirates shows   polytypic plasma cells.     I have personally reviewed all specimens and/or slides, including the   listed special stains, and used them   with my medical judgment to determine the final diagnosis.     Electronically signed out by:   Kai Hamilton M.D.,[facility]     Technical testing/processing performed at [place], [place]     CLINICAL HISTORY:   Per [facility] medical records: 59 year-old male, with prior diagnosis of   multiple myeloma in 2004, was treated with   daratumumab and who status 173 days post second autologous stem cell   transplant. He recently suffered   bacteremia and was treated with Levaquin. Prior bone marrow biopsy   ([id number] from 8/23/19) showed rare   polytypic plasma cells. The patient received G-CSF on 10/21/2019.     OVERALL PLAN   Angel Yanez is a 58 yo man D+198 s/p 2nd autologous transplant for MM. S/p 2 recent readmissions for FUO (10/16-10/23; 9/23-9/29/19).      1.  BMT/MM:   Slow engraftment; cell dose was only 0.639x10^6. ([facility] [place] - known poor cell dose as failed chemo-mobilization 1/2019.)   - Stringent CR.  - day +180 bone marrow biopsy 11/6 which showed no morphologic or immunophenotypic evidence of plasma cell neoplasm. His light chains were not elevated and he had no monoclonal protein in the serum.  Thus he is in clinical remission.  - PET in 8/2019 clear.   - PB FLOW 11/23: No overt aberrant immunophenotype on T cells.  Rare to absent mature B cells and plasma cells.      2.  HEME: Keep Hgb>8g/dL,  plt >20 (epistaxis requiring rhinorocket packing in last admission).   - Give plts today.   - anemic, thrombocytopenic post BMT, low cell dose, +/- infection.   - intermittent neutropenia: GCSF last on 11/21; give prn ANC </=1000. Give gcsf 11/26   - No evidence of HLH on lymphnode bx 10/18/19.     - Persistent thrombocytopenia: BM 11/6/19 showed adequate granulopoiesis and erythropoiesis but his platelet precursors were 0 to absent which is reflected in peripheral CBC. Started promactic 50mcg daily on 11/10. Has been titrated up to 150mg daily (started 11/23).  - Hemolysis eval neg     3.  ID: FUO:  Currently Afebrile, remains on celebrex.   A.) Extensive ID work up unremarkable x 2 sept and oct. 10/16 and 11/9 repeat chest CT: persistent mediastinal adenopathy no paranchymal disease. S/P bx of L axillary node on 10/18: No evidence of HLH, tissues looks reactive.   - ID consulted 11/10, appreciate recs. Extensive ID testing negative but also lots still pending. Following cx results daily.   - Consulted general surgery for excisional lymph node bx (neg core bx 10/18 L axillary). Would like R axillary node (please send for pathology, leukemia/lymphoma, Gram stain, aerobic cx, anaerobic cx, fungal cx, AFB smear and cx, and send out to [facility] for next generation bacterial, mycobacterial, fungal PCR.   - Received prophy Ancef pre-op.  - HTLV neg with very low lymphocyte count. CD4 count 146.  - Per ID with negative w/u and not neutropenic discontinued cefepime 11/12. 11/15 CT Chest new GGO. Resumed cefepime cx's pending. RVP neg. Pulm consulted. No indication for BAL or further treatment/testing. Completed a zpak 11/21.   - Per ID, possible histoplasmosis: empiric itraconazole 200 TID started last admission with fevers initially resolved but now recurred; currently on bid dosing. Pt declining recommened ID follow-up in December. 10/18 Histo/blasto urine antigen negative. Per Dr Kelley flucaonzole can  suppress growth? 10/18 [place] DNA test- negative. 1/12 itra level=1.6.      Right LNTissue culture (11/15): positive for stenotrophomonas malophilia and Ochrobactrum anthropi from broth only. Sensitive to bactrim and levaquin. Discussed with ID 11/26.  Given culture + in broth only, this is likely contaminant but given FUO will treat empirically.   - Start levaquin 750mg daily 11/26. Avoided bactrim given ongoing pancytopenia. Will need to monitor QTc as can interact with levaquin. Obtain EKG today 11/26.   - In 1 week, stop celebrex. If fevers return despite being on levaquin this is likely not the source of the fevers.   - Follow up with Dr. Albarado 12/3 at 2pm.      B.) Autoimmune workup so far negative. Rheum consult, last note on 11/14: feels no evidence of autoimmune disease flair neg studies above but recommended evaluate possible granulomatous finding with lymph nose biopsy to r/o sarcoidosis in the setting of FUO and elevated soluble IL-2 RA--  Per Dr. Max Casas 11/23: cx's and path pending, got a call 11/22 that further send out testing are pending but suspect 2 areas with ? Plasma cells and concern for ? T-cell clonal population, less favoring infection. I asked for PB flow cytometry ordered for 11/23, TCR gene rearrangement on LN and BMB. I also asked the heme pathologist (fellow Osiris Rogers) to review the case in the context of recent BMB results and call me with any updates- I discussed with patient 11/23 that this is all non conclusive till more information is available and finalized review.      C.) Malignancy W/U: Neg BM BX 11/6. Neg PET in 8/2019. Neg core lymph node needle bx 10/18. PSA neg. Admit CT with R kidney hypoenhancing ill-defined focus. MRI 11/13 not suggestive of urothelial or renal cell carcinoma.     Prophy: Itraconazole, ACV, pentamidine (11/19)  - Okay to get flu shot 11/26 following platelets.     4. Pulm:   - tachypnea/SOB: mainly with fevers. On RA. Imaging w/ contrast neg. Crackles  and fluid up have been giving PRN lasix. CT Chest 11/15 new GGO and small effusions. Pulm consult as above. RVP neg. Cx pending. Completed Azithromycin 11/21   - 10/17: Echo with preserved EF, no evidence of right heart strain. Repeat echo 11/14 stable.  - 10/17 VQ scan neg for PE.     5. GI:    - 11/10/2019: LFTs are wnl     5.  FEN/Renal:   - Lyte replacement per scheduled protocol.        6.  Mood: Continue Paxil. Told colleague yesterday: having lots of regret about stem cell transplant with low stem cell dose.  SW discussed this with patient, see note on 11/14/2019     7. Derm:   - Rash noted on back and now chest. Non-bothersome. Derm consult. 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 Was over the torso, Now clinically resolved, never stooped Prometa or held it. Off TCM cream now.    Plan: gcsf, plts today with post plt check    RTC:   Tuesday for labs, provider visit and infusion for possible platelets. (Currently needing plts/gcsf ~q5d)  12/3 with Dr. Albarado

## 2019-12-02 ENCOUNTER — TELEPHONE (OUTPATIENT)
Dept: INFECTIOUS DISEASES | Facility: CLINIC | Age: 59
End: 2019-12-02

## 2019-12-02 DIAGNOSIS — C90.00 MULTIPLE MYELOMA NOT HAVING ACHIEVED REMISSION (H): ICD-10-CM

## 2019-12-02 DIAGNOSIS — Z86.2 PERSONAL HISTORY OF DISEASES OF BLOOD AND BLOOD-FORMING ORGANS: ICD-10-CM

## 2019-12-02 DIAGNOSIS — Z94.81 STATUS POST BONE MARROW TRANSPLANT (H): Primary | ICD-10-CM

## 2019-12-02 RX ORDER — PAROXETINE 20 MG/1
20 TABLET, FILM COATED ORAL EVERY MORNING
Qty: 90 TABLET | Refills: 3 | Status: SHIPPED | OUTPATIENT
Start: 2019-12-02 | End: 2020-02-04

## 2019-12-02 NOTE — TELEPHONE ENCOUNTER
Left message for pt reminding them of upcoming appointment.  Instructed pt to bring updated medications list.    Lizbeth Cavazso CMA CMA    12/2/2019 9:16 AM

## 2019-12-03 ENCOUNTER — ONCOLOGY VISIT (OUTPATIENT)
Dept: TRANSPLANT | Facility: CLINIC | Age: 59
End: 2019-12-03
Attending: INTERNAL MEDICINE
Payer: COMMERCIAL

## 2019-12-03 ENCOUNTER — OFFICE VISIT (OUTPATIENT)
Dept: INFECTIOUS DISEASES | Facility: CLINIC | Age: 59
End: 2019-12-03
Attending: INTERNAL MEDICINE
Payer: COMMERCIAL

## 2019-12-03 ENCOUNTER — APPOINTMENT (OUTPATIENT)
Dept: LAB | Facility: CLINIC | Age: 59
End: 2019-12-03
Attending: INTERNAL MEDICINE
Payer: COMMERCIAL

## 2019-12-03 VITALS
TEMPERATURE: 98.3 F | SYSTOLIC BLOOD PRESSURE: 143 MMHG | DIASTOLIC BLOOD PRESSURE: 90 MMHG | RESPIRATION RATE: 16 BRPM | OXYGEN SATURATION: 100 % | HEART RATE: 81 BPM

## 2019-12-03 VITALS
HEART RATE: 80 BPM | OXYGEN SATURATION: 100 % | SYSTOLIC BLOOD PRESSURE: 157 MMHG | TEMPERATURE: 98.4 F | BODY MASS INDEX: 23.63 KG/M2 | DIASTOLIC BLOOD PRESSURE: 91 MMHG | WEIGHT: 168.5 LBS

## 2019-12-03 VITALS
WEIGHT: 167.2 LBS | SYSTOLIC BLOOD PRESSURE: 129 MMHG | HEART RATE: 79 BPM | TEMPERATURE: 98.9 F | OXYGEN SATURATION: 98 % | BODY MASS INDEX: 23.45 KG/M2 | DIASTOLIC BLOOD PRESSURE: 78 MMHG | RESPIRATION RATE: 16 BRPM

## 2019-12-03 DIAGNOSIS — R50.9 FUO (FEVER OF UNKNOWN ORIGIN): Primary | ICD-10-CM

## 2019-12-03 DIAGNOSIS — L40.50 PSORIASIS WITH ARTHROPATHY (H): Primary | ICD-10-CM

## 2019-12-03 DIAGNOSIS — C90.01 MULTIPLE MYELOMA IN REMISSION (H): Primary | ICD-10-CM

## 2019-12-03 DIAGNOSIS — Z91.89 AT RISK FOR ADVERSE DRUG EVENT: ICD-10-CM

## 2019-12-03 DIAGNOSIS — Z94.81 STATUS POST BONE MARROW TRANSPLANT (H): ICD-10-CM

## 2019-12-03 LAB
ANION GAP SERPL CALCULATED.3IONS-SCNC: 4 MMOL/L (ref 3–14)
ANISOCYTOSIS BLD QL SMEAR: ABNORMAL
BASOPHILS # BLD AUTO: 0 10E9/L (ref 0–0.2)
BASOPHILS NFR BLD AUTO: 0 %
BLD PROD TYP BPU: NORMAL
BLD UNIT ID BPU: 0
BLOOD PRODUCT CODE: NORMAL
BPU ID: NORMAL
BUN SERPL-MCNC: 12 MG/DL (ref 7–30)
CALCIUM SERPL-MCNC: 8.8 MG/DL (ref 8.5–10.1)
CHLORIDE SERPL-SCNC: 109 MMOL/L (ref 94–109)
CO2 SERPL-SCNC: 27 MMOL/L (ref 20–32)
CREAT SERPL-MCNC: 0.68 MG/DL (ref 0.66–1.25)
DACRYOCYTES BLD QL SMEAR: ABNORMAL
DIFFERENTIAL METHOD BLD: ABNORMAL
EOSINOPHIL # BLD AUTO: 0.3 10E9/L (ref 0–0.7)
EOSINOPHIL NFR BLD AUTO: 7 %
ERYTHROCYTE [DISTWIDTH] IN BLOOD BY AUTOMATED COUNT: 23.5 % (ref 10–15)
GFR SERPL CREATININE-BSD FRML MDRD: >90 ML/MIN/{1.73_M2}
GLUCOSE SERPL-MCNC: 113 MG/DL (ref 70–99)
HCT VFR BLD AUTO: 22.5 % (ref 40–53)
HGB BLD-MCNC: 7.5 G/DL (ref 13.3–17.7)
LYMPHOCYTES # BLD AUTO: 0.4 10E9/L (ref 0.8–5.3)
LYMPHOCYTES NFR BLD AUTO: 8.8 %
MACROCYTES BLD QL SMEAR: PRESENT
MCH RBC QN AUTO: 33.2 PG (ref 26.5–33)
MCHC RBC AUTO-ENTMCNC: 33.3 G/DL (ref 31.5–36.5)
MCV RBC AUTO: 100 FL (ref 78–100)
METAMYELOCYTES # BLD: 0 10E9/L
METAMYELOCYTES NFR BLD MANUAL: 0.9 %
MONOCYTES # BLD AUTO: 0.1 10E9/L (ref 0–1.3)
MONOCYTES NFR BLD AUTO: 3.5 %
NEUTROPHILS # BLD AUTO: 3.4 10E9/L (ref 1.6–8.3)
NEUTROPHILS NFR BLD AUTO: 79.8 %
OVALOCYTES BLD QL SMEAR: SLIGHT
PLATELET # BLD AUTO: 10 10E9/L (ref 150–450)
PLATELET # BLD EST: ABNORMAL 10*3/UL
POIKILOCYTOSIS BLD QL SMEAR: ABNORMAL
POTASSIUM SERPL-SCNC: 3.8 MMOL/L (ref 3.4–5.3)
RBC # BLD AUTO: 2.26 10E12/L (ref 4.4–5.9)
SODIUM SERPL-SCNC: 140 MMOL/L (ref 133–144)
TRANSFUSION STATUS PATIENT QL: NORMAL
WBC # BLD AUTO: 4.2 10E9/L (ref 4–11)

## 2019-12-03 PROCEDURE — P9040 RBC LEUKOREDUCED IRRADIATED: HCPCS | Performed by: INTERNAL MEDICINE

## 2019-12-03 PROCEDURE — P9037 PLATE PHERES LEUKOREDU IRRAD: HCPCS | Performed by: PHYSICIAN ASSISTANT

## 2019-12-03 PROCEDURE — G0463 HOSPITAL OUTPT CLINIC VISIT: HCPCS | Mod: 25,27

## 2019-12-03 PROCEDURE — G0463 HOSPITAL OUTPT CLINIC VISIT: HCPCS | Mod: 25

## 2019-12-03 PROCEDURE — 86901 BLOOD TYPING SEROLOGIC RH(D): CPT | Performed by: INTERNAL MEDICINE

## 2019-12-03 PROCEDURE — 86850 RBC ANTIBODY SCREEN: CPT | Performed by: INTERNAL MEDICINE

## 2019-12-03 PROCEDURE — 86900 BLOOD TYPING SEROLOGIC ABO: CPT | Performed by: INTERNAL MEDICINE

## 2019-12-03 PROCEDURE — 84999 UNLISTED CHEMISTRY PROCEDURE: CPT | Performed by: INTERNAL MEDICINE

## 2019-12-03 PROCEDURE — 80048 BASIC METABOLIC PNL TOTAL CA: CPT | Performed by: INTERNAL MEDICINE

## 2019-12-03 PROCEDURE — 93010 ELECTROCARDIOGRAM REPORT: CPT | Performed by: INTERNAL MEDICINE

## 2019-12-03 PROCEDURE — 87551 MYCOBACTERIA DNA AMP PROBE: CPT

## 2019-12-03 PROCEDURE — 87556 M.TUBERCULO DNA AMP PROBE: CPT

## 2019-12-03 PROCEDURE — 87801 DETECT AGNT MULT DNA AMPLI: CPT | Performed by: INTERNAL MEDICINE

## 2019-12-03 PROCEDURE — 84999 UNLISTED CHEMISTRY PROCEDURE: CPT

## 2019-12-03 PROCEDURE — 85025 COMPLETE CBC W/AUTO DIFF WBC: CPT | Performed by: INTERNAL MEDICINE

## 2019-12-03 PROCEDURE — 93005 ELECTROCARDIOGRAM TRACING: CPT

## 2019-12-03 PROCEDURE — 86923 COMPATIBILITY TEST ELECTRIC: CPT | Performed by: INTERNAL MEDICINE

## 2019-12-03 PROCEDURE — 87801 DETECT AGNT MULT DNA AMPLI: CPT

## 2019-12-03 PROCEDURE — 36430 TRANSFUSION BLD/BLD COMPNT: CPT

## 2019-12-03 ASSESSMENT — PAIN SCALES - GENERAL
PAINLEVEL: NO PAIN (0)
PAINLEVEL: NO PAIN (0)

## 2019-12-03 NOTE — NURSING NOTE
"Oncology Rooming Note    December 3, 2019 11:17 AM   Angel Yanez is a 59 year old male who presents for:    Chief Complaint   Patient presents with     Blood Draw     Labs drawn via  by RN in lab. VS taken.      Initial Vitals: /78   Pulse 79   Temp 98.9  F (37.2  C) (Oral)   Resp 16   Wt 75.8 kg (167 lb 3.2 oz)   SpO2 98%   BMI 23.45 kg/m   Estimated body mass index is 23.45 kg/m  as calculated from the following:    Height as of 10/29/19: 1.798 m (5' 10.8\").    Weight as of this encounter: 75.8 kg (167 lb 3.2 oz). Body surface area is 1.95 meters squared.  No Pain (0) Comment: Data Unavailable   No LMP for male patient.  Allergies reviewed: Yes  Medications reviewed: Yes    Medications: Medication refills not needed today.  Pharmacy name entered into EPIC:    [facility] - [place], [place] - [address] [facility] - [place], [place] - [address] [facility] [address]    Clinical concerns: None       Rosana Brown, RN              "

## 2019-12-03 NOTE — LETTER
12/3/2019      RE: Angel Yanez  [address]  [facility] [address]       [facility]    Transplant Infectious Diseases Outpatient Progress note      Patient:  Angel Yanez, Date of birth 1960, Medical record number [id number]  Date of Visit:  12/03/2019           Recommendations:   1. Please discontinue celebrex.   - If fever recurs I would discontinue both itraconazole and levaquin and resume celebrex as not evidence of infection after extensive workup.  - If fever does not recur then will continue levaquin for total of 2 weeks (till 12/10/2019) and itraconazole for total of 3 months (till 1/10/2020) pending final pathology report.   2. EKG to follow on QTc.   - at the time of writing the note the QTc was 444.     RTC: 2 weeks.         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         Active Problems and Infectious Diseases Issues:   1. FUO with LA.  Extensive infectious workup has been negative.  Core LN biopsy 10/18/2019 not diagnostic, it was sent for universal PCR which was negative for bacterial/fungal/mycobacterial PCR.  Upon discussing the LN biopsy 11/15/2019 with Dr. Pimentel (pathology), there was no evidence of infection. The final report is pending expert review at [facility].   Both Gram negative pathogens (from LN cx 11/15/2019) of Ochrobactrum anthropi and S maltohplilia growing only in the broth are not likely to be significant and not likely to account for the patient's FUO.   Similarly with negative 38S PCR on LN biopys from 10/18/2019 and negative fungal cx and pathology from LN biopsy from 11/15/2019, itraconazole is not likely to be needed.     I think at this point, and in the absence of evidence of infection, we should to attempt to stop celebrex, if fever recurs then will have to discontinue levaquin and itraconzole and resume celebrex. If fever does not recur then  will continue levaquin for total of 2 weeks (till 12/10/2019) and itraconazole for total of 3 months (till 1/10/2020).     Will check EKG since the patient is on itraconazole and high dose levaquin.         Old Problems and Infectious Diseases Issues:   1. P aeruginosa BSI due to line infection treated with cefepime and removal of the line.      Other Infectious Disease issues include:  - on itraconazole starting 10/18/2019 with therapeutic levels as of 11/12/2019of 2.1.   - PCP prophylaxis: pentamidine, last dose 11/17/2019.   - Serostatus: CMV+, EBV+, HSV1-/2+, VZV ?      Attestation:  I interviewed the patient and obtained history from the patient, and by reviewing the patient's chart including outside records, microbiological data, and radiological data. All data are summarized in this notes.  Collin Albarado MD   Pager: [phone]  12/03/2019         Interim History:   Still on itraconazole 200 mg bid.   Still levaquin 750 mg daily. He's been afebrile even before starting levaquin.   Remains on celebrex with no fever.         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 He was hospitalized on multiple occasions in 8/2019, 9/2019, 10/2019, 11/2019 for FUO. An extensive wokrup was negative except for CT showing and generalized LA.   The positive C pneumoniae at very low level of 1:64 represents prior exposure. Brucella, Bartonella, Coxiella, Mycoplasma, Rickettsiae serology negative.   Babesia/Anapalasma/Ehrlichia PCR negative.   Coccidioides/Histoplasma Ag, BG glucan, A galactomannan, CRAG  were negative.   KARIUS metagenomic test negative  AFB blood cx negative.   HHV-8 PCR in blood negative.     Core LN biopsy 10/18/2019 not diagnostic, it was sent for universal PCR which was negative for bacterial/fungal/mycobacterial PCR. The patient was started on empirically on itraconazole but there was a question whether the patient filled his  prescription.   When he was readmitted in 11/2019, an excisional LN biopsy 11/15/2019 also not diagnostic, was sent for bacterial, fungal cx grew Ochrobactrum anthropi and S maltohplilia in broth only of unknown significance. He was started empirically on levaquin on 11/26/2019.              Past Medical History:     Past Medical History:   Diagnosis Date     GERD (gastroesophageal reflux disease)      H/O autologous stem cell transplant (H) 02/2005     Hyperlipidemia      Multiple myeloma (H) 2004     PONV (postoperative nausea and vomiting)      Psoriasis      Psoriatic arthritis (H)              Past Surgical History:     Past Surgical History:   Procedure Laterality Date     ARTHROPLASTY HIP       BIOPSY LYMPH NODE AXILLA N/A 11/15/2019    Procedure: Right axillary lymph node biopsy;  Surgeon: Reese Irving MD;  Location: [facility] OR     COLONOSCOPY       HERNIA REPAIR       IR CVC TUNNEL PLACEMENT > 5 YRS OF AGE  1/22/2019     IR CVC TUNNEL PLACEMENT > 5 YRS OF AGE  5/16/2019     IR CVC TUNNEL REMOVAL LEFT  2/20/2019     IR CVC TUNNEL REMOVAL RIGHT  7/23/2019     IR FOLLOW UP VISIT OUTPATIENT  1/24/2019     IR LYMPH NODE BIOPSY  10/18/2019     ORTHOPEDIC SURGERY       PROCURE BONE MARROW N/A 5/14/2019    Procedure: Bone Marrow Trevett;  Surgeon: Antwan Erickson MD;  Location: [facility] OR     TRANSPLANT                 Social History:     Social History     Tobacco Use     Smoking status: Former Smoker     Smokeless tobacco: Never Used   Substance Use Topics     Alcohol use: Yes     Comment: occasionaly             Family History:   I have reviewed this patient's family history          Immunizations:     Immunization History   Administered Date(s) Administered     Influenza (IIV3) PF 12/20/2013     Influenza Vaccine IM > 6 months Valent IIV4 11/26/2019     Influenza Vaccine, 3 YRS +, IM (QUADRIVALENT W/PRESERVATIVES) 10/08/2015, 10/26/2016, 11/04/2017     TD (ADULT, 7+) 12/11/1996     TDAP Vaccine  (Boostrix) 10/08/2015, 07/15/2019     Tetanus 12/11/1996             Allergies:     Allergies   Allergen Reactions     Avalide Hives     Chlorhexidine Itching     Chloroxylenol Rash     Technicare solution     Lorazepam Hives     Moxifloxacin Hives             Medications:     Current Outpatient Medications   Medication Sig     acyclovir (ZOVIRAX) 800 MG tablet Take 1 tablet (800 mg) by mouth 2 times daily     calcium carbonate (CALCIUM CARBONATE) 600 MG tablet Take 2 tablets by mouth daily      celecoxib (CELEBREX) 100 MG capsule Take 1 capsule (100 mg) by mouth 2 times daily     Cholecalciferol (VITAMIN D3) 2000 units CAPS Take 2,000 Units by mouth daily      eltrombopag (PROMACTA) 50 MG tablet Take 150 mg by mouth daily Administer on an empty stomach, 1 hour before or 2 hours after a meal.     itraconazole (SPORANOX) 100 MG capsule Take 2 capsules (200 mg) by mouth 2 times daily     levofloxacin (LEVAQUIN) 750 MG tablet Take 1 tablet (750 mg) by mouth daily     PARoxetine (PAXIL) 20 MG tablet Take 1 tablet (20 mg) by mouth every morning     pentamidine (NEBUPENT) 300 MG neb solution Inhale 300 mg into the lungs every 28 days Will get in BMT clinic. Next due Monday 11/19/19     sulfamethoxazole-trimethoprim (BACTRIM DS/SEPTRA DS) 800-160 MG tablet Take 1 tablet by mouth 2 times daily (Patient not taking: Reported on 12/1/2019)     triamcinolone (KENALOG) 0.1 % external cream Apply topically 2 times daily (Patient not taking: Reported on 12/3/2019)     No current facility-administered medications for this visit.             Review of Systems:   As mentioned in the interim history otherwise negative by reviewing constitutional symptoms, central and peripheral neurological systems, respiratory system, cardiac system, GI system,  system, musculoskeletal, skin, allergy, and lymphatics.                  Physical Exam:     Vitals:    12/03/19 1435   BP: (!) 157/91   BP Location: Right arm   Patient Position: Chair    Cuff Size: Adult Regular   Pulse: 80   Temp: 98.4  F (36.9  C)   TempSrc: Oral   SpO2: 100%   Weight: 76.4 kg (168 lb 8 oz)     Wt Readings from Last 4 Encounters:   12/03/19 76.4 kg (168 lb 8 oz)   12/03/19 75.8 kg (167 lb 3.2 oz)   12/01/19 76 kg (167 lb 8 oz)   11/26/19 75.5 kg (166 lb 6.4 oz)     Constitutional: awake, alert, cooperative, no apparent distress and appears at stated age, well nourished.   Neurologic: Patient is moving all extremities without focal deficit, no focal sensory loss.   Lungs: CTA bilaterally, no accessory muscle use, no dullness to percussion and no abnormal tactile fremitus.   CVS: RRR, normal S1/S2, PMI was not displaced.   Abdomen: non-tender, non-distended, no masses, no bruit, no shifting dullness, normal BS.   Extremities: no pitting edema of bilateral lower extremities, no ulcers, normal ROM of all joints, no swelling or erythema of any of joints and no tenderness to palpation.   Skin: no induration, fluctuation or discharge, and no rash            Laboratory Data:     Absolute CD4   Date Value Ref Range Status   11/12/2019 146 (L) 441 - 2,156 cells/uL Final       Inflammatory Markers    Recent Labs   Lab Test 11/10/19  0413 09/25/19  1022   *  --    .0* 160.0*       Immune Globulin Studies      Recent Labs   Lab Test 11/13/19  0440 11/06/19  1051 10/16/19  0930 09/25/19  1018 08/23/19  1301 06/11/19  0918 05/06/19  0936 01/29/19  0810 01/14/19  0945    1,210 920 507* 824 572* 636* 826 864   IGM  --  31*  --   --  25* 20* 26* 43* 50*   IGE  --   --   --   --   --   --  3  --  6   IGA  --  80  --   --  15* 41* 45* 311 327   IGG1 424  --   --   --   --   --   --   --   --    IGG2 156*  --   --   --   --   --   --   --   --    IGG3 58  --   --   --   --   --   --   --   --    IGG4 13  --   --   --   --   --   --   --   --        Metabolic Studies    Recent Labs   Lab Test 12/03/19  1038 12/01/19  0818 11/26/19  0825 11/23/19  1004 11/21/19  1221  11/19/19  1236  11/09/19  1800  11/06/19  1051  10/20/19  1736 10/20/19  0359    141 141 141 140 142   < >  --    < > 139   < >  --  136   POTASSIUM 3.8 3.6 4.2 4.2 3.6 4.1   < >  --    < > 3.9   < >  --  3.0*   CHLORIDE 109 109 110* 110* 110* 111*   < >  --    < > 107   < >  --  105   CO2 27 27 28 27 24 27   < >  --    < > 27   < >  --  23   ANIONGAP 4 5 3 4 6 4   < >  --    < > 6   < >  --  8   BUN 12 14 17 14 14 18   < >  --    < > 12   < >  --  16   CR 0.68 0.78 0.72 0.69 0.57* 0.60*   < >  --    < > 0.81   < >  --  0.79   GFRESTIMATED >90 >90 >90 >90 >90 >90   < >  --    < > >90   < >  --  >90   * 95 82 90 106* 94   < >  --    < > 92   < >  --  92   ZHEN 8.8 8.9 8.9 8.6 8.4* 8.4*   < >  --    < > 9.0   < >  --  8.1*   PHOS  --   --   --   --   --   --   --   --   --  3.5  --   --  3.2   MAG  --   --  2.1  --   --   --   --   --   --  2.2  --   --  1.9   LACT  --   --   --   --   --   --   --  1.6  --   --   --  1.7  --     < > = values in this interval not displayed.       Hepatic Studies    Recent Labs   Lab Test 12/01/19  0818 11/26/19  0825 11/21/19  1228 11/19/19  1236 11/11/19  1744 11/10/19  0413 11/06/19  1051   BILITOTAL 0.9 0.8 0.8 0.7  --  0.8 0.8   ALKPHOS 103 114 140 145  --  101 124   PROTTOTAL 7.2 6.8 6.6* 6.2*  --  6.0* 7.5   ALBUMIN 3.6 3.5 3.3* 3.0*  --  2.6* 3.7   AST 11 9 16 17  --  10 14   ALT 21 25 38 35  --  17 24   LDH  --   --   --   --  225 282* 196       Hematology Studies     Recent Labs   Lab Test 12/03/19  1038 12/01/19  1035 12/01/19  0818 11/26/19  0825 11/23/19  1004 11/21/19  1228 11/19/19  1236   WBC 4.2  --  1.5* 1.7* 5.7 2.2* 2.7*   ANEU 3.4  --  0.6* 0.4* 3.7 0.7* 1.5*   ALYM 0.4*  --  0.3* 0.8 1.0 0.8 0.7*   NEGAR 0.1  --  0.2 0.2 0.4 0.3 0.1   AEOS 0.3  --  0.3 0.2 0.4 0.4 0.3   HGB 7.5*  --  8.4* 8.7* 8.6* 8.8* 8.0*   HCT 22.5*  --  25.7* 26.7* 26.4* 26.6* 25.1*   PLT 10* 20* 8* 13* 19* 10* 17*       Clotting Studies    Recent Labs   Lab Test 11/15/19  2897  11/14/19  0404 11/12/19  0318 11/09/19  1800 10/16/19  1938 09/24/19  0149 07/22/19  1320   INR 1.17* 1.24* 1.27* 1.40* 1.27* 1.19*  --    PTT  --   --   --  49* 43* 42* 40*       Urine Studies    Recent Labs   Lab Test 11/10/19  1410 11/08/19  1148 09/25/19  0822 09/23/19  1757 07/21/19 2013   URINEPH 6.0 5.0 7.5* 6.0 6.0   NITRITE Negative Negative Negative Negative Negative   LEUKEST Negative Negative Negative Negative Negative   WBCU 2 3 <1 1 1         Microbiology:  Last 6 Culture results with specimen source  Culture Micro   Date Value Ref Range Status   11/16/2019 No VRE isolated  Final   11/16/2019 Light growth  Normal brayan    Final   11/16/2019 Moderate growth  Enterococcus faecalis   (A)  Final   11/16/2019   Preliminary    Culture received and in progress.  Positive AFB results are called as soon as detected.    Final report to follow in 7 to 8 weeks.     11/16/2019   Preliminary    Assayed at [facility] [address] [place] [id number] [phone]   11/16/2019 No yeast isolated  Final    Specimen Description   Date Value Ref Range Status   11/16/2019 Swab  Final   11/16/2019 Sputum  Final   11/16/2019 Sputum  Final   11/16/2019 Sputum  Final   11/16/2019 Sputum  Final   11/16/2019 Sputum  Final        Last check of C difficile  C Diff Toxin B PCR   Date Value Ref Range Status   05/24/2019 Negative NEG^Negative Final     Comment:     Negative: Clostridium difficile target DNA sequences NOT detected, presumed   negative for Clostridium difficile toxin B or the number of bacteria present   may be below the limit of detection for the test.  FDA approved assay performed using Wi[employer] GeneXpert real-time PCR.  A negative result does not exclude actual disease due to Clostridium difficile   and may be due to improper collection, handling and storage of the specimen   or the number of organisms in the specimen is below the detection limit of the   assay.           Virology:  CMV viral loads    CMV  viral loads  No results found for: 85495, 60662, 55497, 08619  CMV viral loads    Recent Labs   Lab Test 11/12/19  0318 10/16/19  0930   CSPEC EDTA PLASMA Plasma, EDTA anticoagulant   CMVLOG Not Calculated Not Calculated       CMV viral loads    Log IU/mL of CMVQNT   Date Value Ref Range Status   11/12/2019 Not Calculated <2.1 [Log_IU]/mL Final   10/16/2019 Not Calculated <2.1 [Log_IU]/mL Final   10/08/2019 Not Calculated <2.1 [Log_IU]/mL Final   09/25/2019 Not Calculated <2.1 [Log_IU]/mL Final   08/23/2019 Not Calculated <2.1 [Log_IU]/mL Final   08/06/2019 Not Calculated <2.1 [Log_IU]/mL Final   07/15/2019 Not Calculated <2.1 [Log_IU]/mL Final   07/08/2019 Not Calculated <2.1 [Log_IU]/mL Final   07/01/2019 Not Calculated <2.1 [Log_IU]/mL Final   06/24/2019 Not Calculated <2.1 [Log_IU]/mL Final   06/17/2019 Not Calculated <2.1 [Log_IU]/mL Final   06/07/2019 Not Calculated <2.1 [Log_IU]/mL Final   05/29/2019 Not Calculated <2.1 [Log_IU]/mL Final   05/17/2019 Not Calculated <2.1 [Log_IU]/mL Final       CMV resistance testing  No lab results found.  No results found for: CMVCID, CMVFOS, CMVGAN     EBV viral loads     Recent Labs   Lab Test 11/19/19  1236 11/12/19  0318 10/16/19  0930 10/08/19  1301 09/25/19  0810   EBRES EBV DNA Not Detected <500* 2,725* 1,203* 2,215*     EBV DNA Copies/mL   Date Value Ref Range Status   11/19/2019 EBV DNA Not Detected EBVNEG^EBV DNA Not Detected [Copies]/mL Final   11/12/2019 <500 (A) EBVNEG^EBV DNA Not Detected [Copies]/mL Final     Comment:     EBV DNA Detected below the reportable range of 500 Copies/mL   10/16/2019 2,725 (A) EBVNEG^EBV DNA Not Detected [Copies]/mL Final   10/08/2019 1,203 (A) EBVNEG^EBV DNA Not Detected [Copies]/mL Final   09/25/2019 2,215 (A) EBVNEG^EBV DNA Not Detected [Copies]/mL Final       Human Herpes Virus 6 viral loads    HHV6 DNA Result   Date Value Ref Range Status   11/12/2019 No HHV6 DNA detected NOHHV^No HHV6 DNA detected Copies/mL Final      HHV6 DNA  Specimen Type   Date Value Ref Range Status   11/12/2019 Whole blood, EDTA anticoagulant  Final       CMV IgG Antibody   Date Value Ref Range Status   01/05/2005 >160.0 EU/mL Final     Comment:     Positive for anti-CMV IgG     Herpes Simplex IgG Antibody Immune Status Ratio   Date Value Ref Range Status   02/04/2005 1.21  Final     Herpes Simplex IgG Antibody Interpretation   Date Value Ref Range Status   02/04/2005 Positive, suggests immunologic exposure.  Final     No results found for: EBIG2, EBIGM, EBVIGG, EBIGG, EBVAGN, BR4644, TOXG    Pathology:  Right axillary LN biopsy 11/15/2019  INTERPRETATION:   Culture of this specimen yielded only 6 metaphase cells for analysis,   among which no clonal chromosomal   abnormality was found. However, this limited number of metaphases is   insufficient to rule out the presence of   a clonal chromosomal abnormality.       Imaging:  CT chest 11/15/2019   IMPRESSION:   1. New groundglass opacities greatest in the upper lobes concerning  for infection.  2. Increased prominence of the interstitium suggesting interstitial  edema and/or component of infection.  3. Small bilateral pleural effusions.  4. Mildly prominent mediastinal lymph nodes are similar to slightly  decreased from 11/9/2019. Post surgical changes of right axillary  jami dissection.    CT c/a/p 11/9/2019  IMPRESSION: In this patient with history of multiple myeloma status  post bone marrow transplant:  1. No specific findings to explain patient's fever of unknown origin.   2. There is a 1.6 cm hypoenhancing ill-defined focus in the superior  pole the right kidney. This is thought to represent sequela of prior  inflammation. Occult malignancy is thought less likely but not  entirely excluded. Recommend follow-up MRI in outpatient setting.  3. The spine is severely demineralized with multilevel compression  fractures which are stable.  4. Stable bilateral axillary, mediastinal, and superficial  inguinal  lymphadenopathy.  5. Stable small peripheral ill-defined hypodensities within the  spleen, nonspecific and may be infarctions.    Collin Albarado MD

## 2019-12-03 NOTE — LETTER
12/3/2019       RE: Angel Yanez  [address]  [facility] [id number]     Dear Colleague,    Thank you for referring your patient, Angel Yanez, to the [facility] AND INFECTIOUS DISEASES at [facility]. Please see a copy of my visit note below.    [facility]    Transplant Infectious Diseases Outpatient Progress note      Patient:  Angel Yanez, Date of birth 1960, Medical record number [id number]  Date of Visit:  12/03/2019           Recommendations:   1. Please discontinue celebrex.   - If fever recurs I would discontinue both itraconazole and levaquin and resume celebrex as not evidence of infection after extensive workup.  - If fever does not recur then will continue levaquin for total of 2 weeks (till 12/10/2019) and itraconazole for total of 3 months (till 1/10/2020) pending final pathology report.   2. EKG to follow on QTc.   - at the time of writing the note the QTc was 444.     RTC: 2 weeks.         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         Active Problems and Infectious Diseases Issues:   1. FUO with LA.  Extensive infectious workup has been negative.  Core LN biopsy 10/18/2019 not diagnostic, it was sent for universal PCR which was negative for bacterial/fungal/mycobacterial PCR.  Upon discussing the LN biopsy 11/15/2019 with Dr. Pimentel (pathology), there was no evidence of infection. The final report is pending expert review at [facility].   Both Gram negative pathogens (from LN cx 11/15/2019) of Ochrobactrum anthropi and S maltohplilia growing only in the broth are not likely to be significant and not likely to account for the patient's FUO.   Similarly with negative 38S PCR on LN biopys from 10/18/2019 and negative fungal cx and pathology from LN biopsy from 11/15/2019, itraconazole is not likely to be needed.     I  think at this point, and in the absence of evidence of infection, we should to attempt to stop celebrex, if fever recurs then will have to discontinue levaquin and itraconzole and resume celebrex. If fever does not recur then will continue levaquin for total of 2 weeks (till 12/10/2019) and itraconazole for total of 3 months (till 1/10/2020).     Will check EKG since the patient is on itraconazole and high dose levaquin.         Old Problems and Infectious Diseases Issues:   1. P aeruginosa BSI due to line infection treated with cefepime and removal of the line.      Other Infectious Disease issues include:  - on itraconazole starting 10/18/2019 with therapeutic levels as of 11/12/2019of 2.1.   - PCP prophylaxis: pentamidine, last dose 11/17/2019.   - Serostatus: CMV+, EBV+, HSV1-/2+, VZV ?      Attestation:  I interviewed the patient and obtained history from the patient, and by reviewing the patient's chart including outside records, microbiological data, and radiological data. All data are summarized in this notes.  Collin Albarado MD   Pager: [phone]  12/03/2019         Interim History:   Still on itraconazole 200 mg bid.   Still levaquin 750 mg daily. He's been afebrile even before starting levaquin.   Remains on celebrex with no fever.         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 He was hospitalized on multiple occasions in 8/2019, 9/2019, 10/2019, 11/2019 for FUO. An extensive wokrup was negative except for CT showing and generalized LA.   The positive C pneumoniae at very low level of 1:64 represents prior exposure. Brucella, Bartonella, Coxiella, Mycoplasma, Rickettsiae serology negative.   Babesia/Anapalasma/Ehrlichia PCR negative.   Coccidioides/Histoplasma Ag, BG glucan, A galactomannan, CRAG  were negative.   KARIUS metagenomic test negative  AFB blood cx negative.   HHV-8 PCR in blood negative.     Core LN biopsy 10/18/2019  not diagnostic, it was sent for universal PCR which was negative for bacterial/fungal/mycobacterial PCR. The patient was started on empirically on itraconazole but there was a question whether the patient filled his prescription.   When he was readmitted in 11/2019, an excisional LN biopsy 11/15/2019 also not diagnostic, was sent for bacterial, fungal cx grew Ochrobactrum anthropi and S maltohplilia in broth only of unknown significance. He was started empirically on levaquin on 11/26/2019.              Past Medical History:     Past Medical History:   Diagnosis Date     GERD (gastroesophageal reflux disease)      H/O autologous stem cell transplant (H) 02/2005     Hyperlipidemia      Multiple myeloma (H) 2004     PONV (postoperative nausea and vomiting)      Psoriasis      Psoriatic arthritis (H)              Past Surgical History:     Past Surgical History:   Procedure Laterality Date     ARTHROPLASTY HIP       BIOPSY LYMPH NODE AXILLA N/A 11/15/2019    Procedure: Right axillary lymph node biopsy;  Surgeon: Reese Irving MD;  Location: [facility] OR     COLONOSCOPY       HERNIA REPAIR       IR CVC TUNNEL PLACEMENT > 5 YRS OF AGE  1/22/2019     IR CVC TUNNEL PLACEMENT > 5 YRS OF AGE  5/16/2019     IR CVC TUNNEL REMOVAL LEFT  2/20/2019     IR CVC TUNNEL REMOVAL RIGHT  7/23/2019     IR FOLLOW UP VISIT OUTPATIENT  1/24/2019     IR LYMPH NODE BIOPSY  10/18/2019     ORTHOPEDIC SURGERY       PROCURE BONE MARROW N/A 5/14/2019    Procedure: Bone Marrow Durand;  Surgeon: Antwan Erickson MD;  Location: [facility] OR     TRANSPLANT                 Social History:     Social History     Tobacco Use     Smoking status: Former Smoker     Smokeless tobacco: Never Used   Substance Use Topics     Alcohol use: Yes     Comment: occasionaly             Family History:   I have reviewed this patient's family history          Immunizations:     Immunization History   Administered Date(s) Administered     Influenza (IIV3) PF 12/20/2013      Influenza Vaccine IM > 6 months Valent IIV4 11/26/2019     Influenza Vaccine, 3 YRS +, IM (QUADRIVALENT W/PRESERVATIVES) 10/08/2015, 10/26/2016, 11/04/2017     TD (ADULT, 7+) 12/11/1996     TDAP Vaccine (Boostrix) 10/08/2015, 07/15/2019     Tetanus 12/11/1996             Allergies:     Allergies   Allergen Reactions     Avalide Hives     Chlorhexidine Itching     Chloroxylenol Rash     Technicare solution     Lorazepam Hives     Moxifloxacin Hives             Medications:     Current Outpatient Medications   Medication Sig     acyclovir (ZOVIRAX) 800 MG tablet Take 1 tablet (800 mg) by mouth 2 times daily     calcium carbonate (CALCIUM CARBONATE) 600 MG tablet Take 2 tablets by mouth daily      celecoxib (CELEBREX) 100 MG capsule Take 1 capsule (100 mg) by mouth 2 times daily     Cholecalciferol (VITAMIN D3) 2000 units CAPS Take 2,000 Units by mouth daily      eltrombopag (PROMACTA) 50 MG tablet Take 150 mg by mouth daily Administer on an empty stomach, 1 hour before or 2 hours after a meal.     itraconazole (SPORANOX) 100 MG capsule Take 2 capsules (200 mg) by mouth 2 times daily     levofloxacin (LEVAQUIN) 750 MG tablet Take 1 tablet (750 mg) by mouth daily     PARoxetine (PAXIL) 20 MG tablet Take 1 tablet (20 mg) by mouth every morning     pentamidine (NEBUPENT) 300 MG neb solution Inhale 300 mg into the lungs every 28 days Will get in BMT clinic. Next due Monday 11/19/19     sulfamethoxazole-trimethoprim (BACTRIM DS/SEPTRA DS) 800-160 MG tablet Take 1 tablet by mouth 2 times daily (Patient not taking: Reported on 12/1/2019)     triamcinolone (KENALOG) 0.1 % external cream Apply topically 2 times daily (Patient not taking: Reported on 12/3/2019)     No current facility-administered medications for this visit.             Review of Systems:   As mentioned in the interim history otherwise negative by reviewing constitutional symptoms, central and peripheral neurological systems, respiratory system, cardiac  system, GI system,  system, musculoskeletal, skin, allergy, and lymphatics.                  Physical Exam:     Vitals:    12/03/19 1435   BP: (!) 157/91   BP Location: Right arm   Patient Position: Chair   Cuff Size: Adult Regular   Pulse: 80   Temp: 98.4  F (36.9  C)   TempSrc: Oral   SpO2: 100%   Weight: 76.4 kg (168 lb 8 oz)     Wt Readings from Last 4 Encounters:   12/03/19 76.4 kg (168 lb 8 oz)   12/03/19 75.8 kg (167 lb 3.2 oz)   12/01/19 76 kg (167 lb 8 oz)   11/26/19 75.5 kg (166 lb 6.4 oz)     Constitutional: awake, alert, cooperative, no apparent distress and appears at stated age, well nourished.   Neurologic: Patient is moving all extremities without focal deficit, no focal sensory loss.   Lungs: CTA bilaterally, no accessory muscle use, no dullness to percussion and no abnormal tactile fremitus.   CVS: RRR, normal S1/S2, PMI was not displaced.   Abdomen: non-tender, non-distended, no masses, no bruit, no shifting dullness, normal BS.   Extremities: no pitting edema of bilateral lower extremities, no ulcers, normal ROM of all joints, no swelling or erythema of any of joints and no tenderness to palpation.   Skin: no induration, fluctuation or discharge, and no rash            Laboratory Data:     Absolute CD4   Date Value Ref Range Status   11/12/2019 146 (L) 441 - 2,156 cells/uL Final       Inflammatory Markers    Recent Labs   Lab Test 11/10/19  0413 09/25/19  1022   *  --    .0* 160.0*       Immune Globulin Studies      Recent Labs   Lab Test 11/13/19  0440 11/06/19  1051 10/16/19  0930 09/25/19  1018 08/23/19  1301 06/11/19  0918 05/06/19  0936 01/29/19  0810 01/14/19  0945    1,210 920 507* 824 572* 636* 826 864   IGM  --  31*  --   --  25* 20* 26* 43* 50*   IGE  --   --   --   --   --   --  3  --  6   IGA  --  80  --   --  15* 41* 45* 311 327   IGG1 424  --   --   --   --   --   --   --   --    IGG2 156*  --   --   --   --   --   --   --   --    IGG3 58  --   --   --   --    --   --   --   --    IGG4 13  --   --   --   --   --   --   --   --        Metabolic Studies    Recent Labs   Lab Test 12/03/19  1038 12/01/19 0818 11/26/19  0825 11/23/19  1004 11/21/19  1228 11/19/19  1236  11/09/19  1800  11/06/19  1051  10/20/19  1736 10/20/19  0359    141 141 141 140 142   < >  --    < > 139   < >  --  136   POTASSIUM 3.8 3.6 4.2 4.2 3.6 4.1   < >  --    < > 3.9   < >  --  3.0*   CHLORIDE 109 109 110* 110* 110* 111*   < >  --    < > 107   < >  --  105   CO2 27 27 28 27 24 27   < >  --    < > 27   < >  --  23   ANIONGAP 4 5 3 4 6 4   < >  --    < > 6   < >  --  8   BUN 12 14 17 14 14 18   < >  --    < > 12   < >  --  16   CR 0.68 0.78 0.72 0.69 0.57* 0.60*   < >  --    < > 0.81   < >  --  0.79   GFRESTIMATED >90 >90 >90 >90 >90 >90   < >  --    < > >90   < >  --  >90   * 95 82 90 106* 94   < >  --    < > 92   < >  --  92   ZHEN 8.8 8.9 8.9 8.6 8.4* 8.4*   < >  --    < > 9.0   < >  --  8.1*   PHOS  --   --   --   --   --   --   --   --   --  3.5  --   --  3.2   MAG  --   --  2.1  --   --   --   --   --   --  2.2  --   --  1.9   LACT  --   --   --   --   --   --   --  1.6  --   --   --  1.7  --     < > = values in this interval not displayed.       Hepatic Studies    Recent Labs   Lab Test 12/01/19 0818 11/26/19  0825 11/21/19  1228 11/19/19  1236 11/11/19  1744 11/10/19  0413 11/06/19  1051   BILITOTAL 0.9 0.8 0.8 0.7  --  0.8 0.8   ALKPHOS 103 114 140 145  --  101 124   PROTTOTAL 7.2 6.8 6.6* 6.2*  --  6.0* 7.5   ALBUMIN 3.6 3.5 3.3* 3.0*  --  2.6* 3.7   AST 11 9 16 17  --  10 14   ALT 21 25 38 35  --  17 24   LDH  --   --   --   --  225 282* 196       Hematology Studies     Recent Labs   Lab Test 12/03/19  1038 12/01/19  1035 12/01/19  0818 11/26/19  0825 11/23/19  1004 11/21/19  1228 11/19/19  1236   WBC 4.2  --  1.5* 1.7* 5.7 2.2* 2.7*   ANEU 3.4  --  0.6* 0.4* 3.7 0.7* 1.5*   ALYM 0.4*  --  0.3* 0.8 1.0 0.8 0.7*   NEGAR 0.1  --  0.2 0.2 0.4 0.3 0.1   AEOS 0.3  --  0.3 0.2 0.4  0.4 0.3   HGB 7.5*  --  8.4* 8.7* 8.6* 8.8* 8.0*   HCT 22.5*  --  25.7* 26.7* 26.4* 26.6* 25.1*   PLT 10* 20* 8* 13* 19* 10* 17*       Clotting Studies    Recent Labs   Lab Test 11/15/19  1256 11/14/19  0404 11/12/19  0318 11/09/19  1800 10/16/19  1938 09/24/19  0149 07/22/19  1320   INR 1.17* 1.24* 1.27* 1.40* 1.27* 1.19*  --    PTT  --   --   --  49* 43* 42* 40*       Urine Studies    Recent Labs   Lab Test 11/10/19  1410 11/08/19  1148 09/25/19  0822 09/23/19  1757 07/21/19 2013   URINEPH 6.0 5.0 7.5* 6.0 6.0   NITRITE Negative Negative Negative Negative Negative   LEUKEST Negative Negative Negative Negative Negative   WBCU 2 3 <1 1 1         Microbiology:  Last 6 Culture results with specimen source  Culture Micro   Date Value Ref Range Status   11/16/2019 No VRE isolated  Final   11/16/2019 Light growth  Normal brayan    Final   11/16/2019 Moderate growth  Enterococcus faecalis   (A)  Final   11/16/2019   Preliminary    Culture received and in progress.  Positive AFB results are called as soon as detected.    Final report to follow in 7 to 8 weeks.     11/16/2019   Preliminary    Assayed at [facility] [address] [place] [id number] [phone]   11/16/2019 No yeast isolated  Final    Specimen Description   Date Value Ref Range Status   11/16/2019 Swab  Final   11/16/2019 Sputum  Final   11/16/2019 Sputum  Final   11/16/2019 Sputum  Final   11/16/2019 Sputum  Final   11/16/2019 Sputum  Final        Last check of C difficile  C Diff Toxin B PCR   Date Value Ref Range Status   05/24/2019 Negative NEG^Negative Final     Comment:     Negative: Clostridium difficile target DNA sequences NOT detected, presumed   negative for Clostridium difficile toxin B or the number of bacteria present   may be below the limit of detection for the test.  FDA approved assay performed using [employer] GeneXpert real-time PCR.  A negative result does not exclude actual disease due to Clostridium difficile   and may be due  to improper collection, handling and storage of the specimen   or the number of organisms in the specimen is below the detection limit of the   assay.           Virology:  CMV viral loads    CMV viral loads  No results found for: 43389, 00795, 62996, 88357  CMV viral loads    Recent Labs   Lab Test 11/12/19  0318 10/16/19  0930   CSPEC EDTA PLASMA Plasma, EDTA anticoagulant   CMVLOG Not Calculated Not Calculated       CMV viral loads    Log IU/mL of CMVQNT   Date Value Ref Range Status   11/12/2019 Not Calculated <2.1 [Log_IU]/mL Final   10/16/2019 Not Calculated <2.1 [Log_IU]/mL Final   10/08/2019 Not Calculated <2.1 [Log_IU]/mL Final   09/25/2019 Not Calculated <2.1 [Log_IU]/mL Final   08/23/2019 Not Calculated <2.1 [Log_IU]/mL Final   08/06/2019 Not Calculated <2.1 [Log_IU]/mL Final   07/15/2019 Not Calculated <2.1 [Log_IU]/mL Final   07/08/2019 Not Calculated <2.1 [Log_IU]/mL Final   07/01/2019 Not Calculated <2.1 [Log_IU]/mL Final   06/24/2019 Not Calculated <2.1 [Log_IU]/mL Final   06/17/2019 Not Calculated <2.1 [Log_IU]/mL Final   06/07/2019 Not Calculated <2.1 [Log_IU]/mL Final   05/29/2019 Not Calculated <2.1 [Log_IU]/mL Final   05/17/2019 Not Calculated <2.1 [Log_IU]/mL Final       CMV resistance testing  No lab results found.  No results found for: CMVCID, CMVFOS, CMVGAN     EBV viral loads     Recent Labs   Lab Test 11/19/19  1236 11/12/19  0318 10/16/19  0930 10/08/19  1301 09/25/19  0810   EBRES EBV DNA Not Detected <500* 2,725* 1,203* 2,215*     EBV DNA Copies/mL   Date Value Ref Range Status   11/19/2019 EBV DNA Not Detected EBVNEG^EBV DNA Not Detected [Copies]/mL Final   11/12/2019 <500 (A) EBVNEG^EBV DNA Not Detected [Copies]/mL Final     Comment:     EBV DNA Detected below the reportable range of 500 Copies/mL   10/16/2019 2,725 (A) EBVNEG^EBV DNA Not Detected [Copies]/mL Final   10/08/2019 1,203 (A) EBVNEG^EBV DNA Not Detected [Copies]/mL Final   09/25/2019 2,215 (A) EBVNEG^EBV DNA Not Detected  [Copies]/mL Final       Human Herpes Virus 6 viral loads    HHV6 DNA Result   Date Value Ref Range Status   11/12/2019 No HHV6 DNA detected NOHHV^No HHV6 DNA detected Copies/mL Final      HHV6 DNA Specimen Type   Date Value Ref Range Status   11/12/2019 Whole blood, EDTA anticoagulant  Final       CMV IgG Antibody   Date Value Ref Range Status   01/05/2005 >160.0 EU/mL Final     Comment:     Positive for anti-CMV IgG     Herpes Simplex IgG Antibody Immune Status Ratio   Date Value Ref Range Status   02/04/2005 1.21  Final     Herpes Simplex IgG Antibody Interpretation   Date Value Ref Range Status   02/04/2005 Positive, suggests immunologic exposure.  Final     No results found for: EBIG2, EBIGM, EBVIGG, EBIGG, EBVAGN, AS9633, TOXG    Pathology:  Right axillary LN biopsy 11/15/2019  INTERPRETATION:   Culture of this specimen yielded only 6 metaphase cells for analysis,   among which no clonal chromosomal   abnormality was found. However, this limited number of metaphases is   insufficient to rule out the presence of   a clonal chromosomal abnormality.       Imaging:  CT chest 11/15/2019   IMPRESSION:   1. New groundglass opacities greatest in the upper lobes concerning  for infection.  2. Increased prominence of the interstitium suggesting interstitial  edema and/or component of infection.  3. Small bilateral pleural effusions.  4. Mildly prominent mediastinal lymph nodes are similar to slightly  decreased from 11/9/2019. Post surgical changes of right axillary  jami dissection.    CT c/a/p 11/9/2019  IMPRESSION: In this patient with history of multiple myeloma status  post bone marrow transplant:  1. No specific findings to explain patient's fever of unknown origin.   2. There is a 1.6 cm hypoenhancing ill-defined focus in the superior  pole the right kidney. This is thought to represent sequela of prior  inflammation. Occult malignancy is thought less likely but not  entirely excluded. Recommend follow-up MRI in  outpatient setting.  3. The spine is severely demineralized with multilevel compression  fractures which are stable.  4. Stable bilateral axillary, mediastinal, and superficial inguinal  lymphadenopathy.  5. Stable small peripheral ill-defined hypodensities within the  spleen, nonspecific and may be infarctions.    Again, thank you for allowing me to participate in the care of your patient.      Sincerely,    Collin Albarado MD

## 2019-12-03 NOTE — Clinical Note
Hello, would it be possible to attempt to stop celebrex to assess the need for levaquin and itraconazole? If the fever recurs then I would discontinue both itraconazole and levaquin. Will keep an eye on the final report of the LN biopsy.

## 2019-12-03 NOTE — PROGRESS NOTES
BMT Clinic Note   12/03/2019    Patient ID:  Angel Yanez is a 59 year old male, currently day +200 s/p auto PBSCT.     Diagnosis MM Multiple myeloma  HCT Type Autologous    Prep Regimen Cytoxan  Melphalan   Primary BMT Provider Dr. Lucio     INTERVAL  HISTORY   Feeling relatively well. Denies fever, continues on Celebrex bid. Has ID follow-up appt today. Plans to go to work tomorrow. Appetite is good and he denies n/v/d/c.     Review of Systems:  as noted above    PHYSICAL EXAM     Blood pressure 129/78, pulse 79, temperature 98.9  F (37.2  C), temperature source Oral, resp. rate 16, weight 75.8 kg (167 lb 3.2 oz), SpO2 98 %.    KPS:  90  General: NAD   ENT: sclera anicteric  Lungs: CTAB  Cardiovascular: RRR, no M/R/G   Abdominal/Rectal: +BS, soft, NT, ND  Lymphatics: no edema  Skin: no rash  Neuro: A&O     Labs:  Lab Results   Component Value Date    WBC 4.2 12/03/2019    ANEU 3.4 12/03/2019    HGB 7.5 (L) 12/03/2019    HCT 22.5 (L) 12/03/2019    PLT 10 (LL) 12/03/2019     12/03/2019    POTASSIUM 3.8 12/03/2019    CHLORIDE 109 12/03/2019    CO2 27 12/03/2019     (H) 12/03/2019    BUN 12 12/03/2019    CR 0.68 12/03/2019    MAG 2.1 11/26/2019    INR 1.17 (H) 11/15/2019    BILITOTAL 0.9 12/01/2019    AST 11 12/01/2019    ALT 21 12/01/2019    ALKPHOS 103 12/01/2019    PROTTOTAL 7.2 12/01/2019    ALBUMIN 3.6 12/01/2019       ASSESSMENT/PLAN   Angel Yanez is a 58 yo man D+200 s/p 2nd autologous transplant for MM.   S/p 2 readmissions for FUO (10/16-10/23; 9/23-9/29/19).      1.  BMT/MM:   Slow engraftment; cell dose was only 0.639x10^6. (Marrow Carlos - known poor cell dose as failed chemo-mobilization 1/2019.)   - Stringent CR.  - day +180 bone marrow biopsy 11/6 which showed no morphologic or immunophenotypic evidence of plasma cell neoplasm. His light chains were not elevated and he had no monoclonal protein in the serum.  Thus he is in clinical remission.  - PET in 8/2019 clear.   - PB FLOW  11/23: No overt aberrant immunophenotype on T cells.  Rare to absent mature B cells and plasma cells.      2.  HEME: Keep Hgb>8g/dL, plt >20 (epistaxis requiring rhinorocket packing in last admission).   - plts and RBCs today  - anemic, thrombocytopenic post BMT, low cell dose, +/- infection.   - intermittent neutropenia: give prn ANC </=1000  - No evidence of HLH on lymphnode bx 10/18/19.     - Persistent thrombocytopenia: BM 11/6/19 showed adequate granulopoiesis and erythropoiesis but his platelet precursors were 0 to absent which is reflected in peripheral CBC. Started promactic 50mcg daily on 11/10. Has been titrated up to 150mg daily (started 11/23); no change. Pt will complete Rx this week then stop.  - Hemolysis eval neg     3.  ID: Currently Afebrile, remains on celebrex.   Hx FUO  A.) Extensive ID work up unremarkable x 2 sept and oct. 10/16 and 11/9 repeat chest CT: persistent mediastinal adenopathy no paranchymal disease. S/P bx of L axillary node on 10/18: No evidence of HLH, tissues looks reactive.   - s/p excisional R axillary lymph node bx 11/15/19 (neg core bx 10/18 L axillary). Bx sent to [facility] 11/26 - pending as of 12/3.  - HTLV neg with very low lymphocyte count. CD4 count 146.  - Per ID with negative w/u and not neutropenic discontinued cefepime 11/12. 11/15 CT Chest new GGO. Resumed cefepime cx's pending. RVP neg. Pulm consulted. No indication for BAL or further treatment/testing. Completed a zpak 11/21.   - Per ID, possible histoplasmosis: empiric itraconazole 200 TID started last admission with fevers initially resolved but now recurred; currently on bid dosing. 10/18 Histo/blasto urine antigen negative. Per Dr Kelley flucaonzole can suppress growth? 10/18 Kairus DNA test- negative. 1/12 itra level=1.6.    Right LNTissue culture (11/15): positive for stenotrophomonas malophilia and Ochrobactrum anthropi from broth only. Sensitive to bactrim and levaquin. Discussed with ID 11/26.  Given culture +  in broth only, this is likely contaminant but given FUO will treat empirically.   - Start levaquin 750mg daily 11/26. Avoided bactrim given ongoing pancytopenia.   - Follow up with Dr. Albarado 12/3.      B.) Autoimmune workup so far negative. Rheum consult, last note on 11/14: felt no evidence of autoimmune disease flair neg studies above but recommended evaluate possible granulomatous finding with lymph nose biopsy to r/o sarcoidosis in the setting of FUO and elevated soluble IL-2 RA--      C.) Malignancy W/U: Neg BM BX 11/6. Neg PET in 8/2019. Neg core lymph node needle bx 10/18. PSA neg. Admit CT with R kidney hypoenhancing ill-defined focus. MRI 11/13 not suggestive of urothelial or renal cell carcinoma.   R axillary LN path still pending as of 12/3 - sent to [facility] (Dr. Dubon) for review as complicated/inconclusive here.     Prophy: Itraconazole, ACV, pentamidine (11/19)  - influenza vaccination given 11/26/19    4. Pulm:   - tachypnea/SOB: mainly with fevers. On RA. Imaging w/ contrast neg. Crackles and fluid up have been giving PRN lasix. CT Chest 11/15 new GGO and small effusions. Pulm consult as above. RVP neg. Cx pending. Completed Azithromycin 11/21   - 10/17: Echo with preserved EF, no evidence of right heart strain. Repeat echo 11/14 stable.  - 10/17 VQ scan neg for PE.     5. GI:  No complaints.  - LFTs wnl 12/1    6.  FEN/Renal: Creat, lytes wnl.     7.  Mood: Continue Paxil.    8. Derm:   - recent rash on torso, now resolved. Derm consuledt. 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     Plan:  Plts, 1u RBCs today  F/u with Dr. Albarado  RTC Friday for labs, f/u, possible RBC/plts.    JESICA MenaC  [phone]

## 2019-12-03 NOTE — PROGRESS NOTES
Infusion Nursing Note:  Angel Yanez presents today for blood transfusions.    Patient seen by provider today: Yes: SHANNON Mena   present during visit today: Not Applicable.    Note: Patient received 1 unit PRBC, 1 unit platelets.     Intravenous Access:  Peripheral IV placed.    Treatment Conditions:  Lab Results   Component Value Date    HGB 7.5 12/03/2019     Lab Results   Component Value Date    WBC 4.2 12/03/2019      Lab Results   Component Value Date    ANEU 3.4 12/03/2019     Lab Results   Component Value Date    PLT 10 12/03/2019      Results reviewed, labs MET treatment parameters, ok to proceed with treatment.      Post Infusion Assessment:  Patient tolerated infusion without incident.  Blood return noted pre and post infusion.  Site patent and intact, free from redness, edema or discomfort.  No evidence of extravasations.  Access discontinued per protocol.       Discharge Plan:   Discharge instructions reviewed with: Patient.  Patient and/or family verbalized understanding of discharge instructions and all questions answered.  Patient discharged in stable condition accompanied by: self.  Departure Mode: Ambulatory.    Clau Sibley RN

## 2019-12-03 NOTE — PROGRESS NOTES
[facility]    Transplant Infectious Diseases Outpatient Progress note      Patient:  Angel Yanez, Date of birth 1960, Medical record number [id number]  Date of Visit:  12/03/2019           Recommendations:   1. Please discontinue celebrex.   - If fever recurs I would discontinue both itraconazole and levaquin and resume celebrex as not evidence of infection after extensive workup.  - If fever does not recur then will continue levaquin for total of 2 weeks (till 12/10/2019) and itraconazole for total of 3 months (till 1/10/2020) pending final pathology report.   2. EKG to follow on QTc.   - at the time of writing the note the QTc was 444.     RTC: 2 weeks.         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         Active Problems and Infectious Diseases Issues:   1. FUO with LA.  Extensive infectious workup has been negative.  Core LN biopsy 10/18/2019 not diagnostic, it was sent for universal PCR which was negative for bacterial/fungal/mycobacterial PCR.  Upon discussing the LN biopsy 11/15/2019 with Dr. Pimentel (pathology), there was no evidence of infection. The final report is pending expert review at [facility].   Both Gram negative pathogens (from LN cx 11/15/2019) of Ochrobactrum anthropi and S maltohplilia growing only in the broth are not likely to be significant and not likely to account for the patient's FUO.   Similarly with negative 38S PCR on LN biopys from 10/18/2019 and negative fungal cx and pathology from LN biopsy from 11/15/2019, itraconazole is not likely to be needed.     I think at this point, and in the absence of evidence of infection, we should to attempt to stop celebrex, if fever recurs then will have to discontinue levaquin and itraconzole and resume celebrex. If fever does not recur then will continue levaquin for total of 2 weeks (till 12/10/2019) and itraconazole for  total of 3 months (till 1/10/2020).     Will check EKG since the patient is on itraconazole and high dose levaquin.         Old Problems and Infectious Diseases Issues:   1. P aeruginosa BSI due to line infection treated with cefepime and removal of the line.      Other Infectious Disease issues include:  - on itraconazole starting 10/18/2019 with therapeutic levels as of 11/12/2019of 2.1.   - PCP prophylaxis: pentamidine, last dose 11/17/2019.   - Serostatus: CMV+, EBV+, HSV1-/2+, VZV ?      Attestation:  I interviewed the patient and obtained history from the patient, and by reviewing the patient's chart including outside records, microbiological data, and radiological data. All data are summarized in this notes.  Collin Albarado MD   Pager: [phone]  12/03/2019         Interim History:   Still on itraconazole 200 mg bid.   Still levaquin 750 mg daily. He's been afebrile even before starting levaquin.   Remains on celebrex with no fever.         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 He was hospitalized on multiple occasions in 8/2019, 9/2019, 10/2019, 11/2019 for FUO. An extensive wokrup was negative except for CT showing and generalized LA.   The positive C pneumoniae at very low level of 1:64 represents prior exposure. Brucella, Bartonella, Coxiella, Mycoplasma, Rickettsiae serology negative.   Babesia/Anapalasma/Ehrlichia PCR negative.   Coccidioides/Histoplasma Ag, BG glucan, A galactomannan, CRAG  were negative.   KARIUS metagenomic test negative  AFB blood cx negative.   HHV-8 PCR in blood negative.     Core LN biopsy 10/18/2019 not diagnostic, it was sent for universal PCR which was negative for bacterial/fungal/mycobacterial PCR. The patient was started on empirically on itraconazole but there was a question whether the patient filled his prescription.   When he was readmitted in 11/2019, an excisional LN biopsy 11/15/2019 also  not diagnostic, was sent for bacterial, fungal cx grew Ochrobactrum anthropi and S maltohplilia in broth only of unknown significance. He was started empirically on levaquin on 11/26/2019.              Past Medical History:     Past Medical History:   Diagnosis Date     GERD (gastroesophageal reflux disease)      H/O autologous stem cell transplant (H) 02/2005     Hyperlipidemia      Multiple myeloma (H) 2004     PONV (postoperative nausea and vomiting)      Psoriasis      Psoriatic arthritis (H)              Past Surgical History:     Past Surgical History:   Procedure Laterality Date     ARTHROPLASTY HIP       BIOPSY LYMPH NODE AXILLA N/A 11/15/2019    Procedure: Right axillary lymph node biopsy;  Surgeon: Reese Irving MD;  Location: [facility] OR     COLONOSCOPY       HERNIA REPAIR       IR CVC TUNNEL PLACEMENT > 5 YRS OF AGE  1/22/2019     IR CVC TUNNEL PLACEMENT > 5 YRS OF AGE  5/16/2019     IR CVC TUNNEL REMOVAL LEFT  2/20/2019     IR CVC TUNNEL REMOVAL RIGHT  7/23/2019     IR FOLLOW UP VISIT OUTPATIENT  1/24/2019     IR LYMPH NODE BIOPSY  10/18/2019     ORTHOPEDIC SURGERY       PROCURE BONE MARROW N/A 5/14/2019    Procedure: Bone Marrow [place];  Surgeon: Antwan Erickson MD;  Location: [facility] OR     TRANSPLANT                 Social History:     Social History     Tobacco Use     Smoking status: Former Smoker     Smokeless tobacco: Never Used   Substance Use Topics     Alcohol use: Yes     Comment: occasionaly             Family History:   I have reviewed this patient's family history          Immunizations:     Immunization History   Administered Date(s) Administered     Influenza (IIV3) PF 12/20/2013     Influenza Vaccine IM > 6 months Valent IIV4 11/26/2019     Influenza Vaccine, 3 YRS +, IM (QUADRIVALENT W/PRESERVATIVES) 10/08/2015, 10/26/2016, 11/04/2017     TD (ADULT, 7+) 12/11/1996     TDAP Vaccine (Boostrix) 10/08/2015, 07/15/2019     Tetanus 12/11/1996             Allergies:     Allergies    Allergen Reactions     Avalide Hives     Chlorhexidine Itching     Chloroxylenol Rash     Technicare solution     Lorazepam Hives     Moxifloxacin Hives             Medications:     Current Outpatient Medications   Medication Sig     acyclovir (ZOVIRAX) 800 MG tablet Take 1 tablet (800 mg) by mouth 2 times daily     calcium carbonate (CALCIUM CARBONATE) 600 MG tablet Take 2 tablets by mouth daily      celecoxib (CELEBREX) 100 MG capsule Take 1 capsule (100 mg) by mouth 2 times daily     Cholecalciferol (VITAMIN D3) 2000 units CAPS Take 2,000 Units by mouth daily      eltrombopag (PROMACTA) 50 MG tablet Take 150 mg by mouth daily Administer on an empty stomach, 1 hour before or 2 hours after a meal.     itraconazole (SPORANOX) 100 MG capsule Take 2 capsules (200 mg) by mouth 2 times daily     levofloxacin (LEVAQUIN) 750 MG tablet Take 1 tablet (750 mg) by mouth daily     PARoxetine (PAXIL) 20 MG tablet Take 1 tablet (20 mg) by mouth every morning     pentamidine (NEBUPENT) 300 MG neb solution Inhale 300 mg into the lungs every 28 days Will get in BMT clinic. Next due Monday 11/19/19     sulfamethoxazole-trimethoprim (BACTRIM DS/SEPTRA DS) 800-160 MG tablet Take 1 tablet by mouth 2 times daily (Patient not taking: Reported on 12/1/2019)     triamcinolone (KENALOG) 0.1 % external cream Apply topically 2 times daily (Patient not taking: Reported on 12/3/2019)     No current facility-administered medications for this visit.             Review of Systems:   As mentioned in the interim history otherwise negative by reviewing constitutional symptoms, central and peripheral neurological systems, respiratory system, cardiac system, GI system,  system, musculoskeletal, skin, allergy, and lymphatics.                  Physical Exam:     Vitals:    12/03/19 1435   BP: (!) 157/91   BP Location: Right arm   Patient Position: Chair   Cuff Size: Adult Regular   Pulse: 80   Temp: 98.4  F (36.9  C)   TempSrc: Oral   SpO2: 100%    Weight: 76.4 kg (168 lb 8 oz)     Wt Readings from Last 4 Encounters:   12/03/19 76.4 kg (168 lb 8 oz)   12/03/19 75.8 kg (167 lb 3.2 oz)   12/01/19 76 kg (167 lb 8 oz)   11/26/19 75.5 kg (166 lb 6.4 oz)     Constitutional: awake, alert, cooperative, no apparent distress and appears at stated age, well nourished.   Neurologic: Patient is moving all extremities without focal deficit, no focal sensory loss.   Lungs: CTA bilaterally, no accessory muscle use, no dullness to percussion and no abnormal tactile fremitus.   CVS: RRR, normal S1/S2, PMI was not displaced.   Abdomen: non-tender, non-distended, no masses, no bruit, no shifting dullness, normal BS.   Extremities: no pitting edema of bilateral lower extremities, no ulcers, normal ROM of all joints, no swelling or erythema of any of joints and no tenderness to palpation.   Skin: no induration, fluctuation or discharge, and no rash            Laboratory Data:     Absolute CD4   Date Value Ref Range Status   11/12/2019 146 (L) 441 - 2,156 cells/uL Final       Inflammatory Markers    Recent Labs   Lab Test 11/10/19  0413 09/25/19  1022   *  --    .0* 160.0*       Immune Globulin Studies      Recent Labs   Lab Test 11/13/19  0440 11/06/19  1051 10/16/19  0930 09/25/19  1018 08/23/19  1301 06/11/19  0918 05/06/19  0936 01/29/19  0810 01/14/19  0945    1,210 920 507* 824 572* 636* 826 864   IGM  --  31*  --   --  25* 20* 26* 43* 50*   IGE  --   --   --   --   --   --  3  --  6   IGA  --  80  --   --  15* 41* 45* 311 327   IGG1 424  --   --   --   --   --   --   --   --    IGG2 156*  --   --   --   --   --   --   --   --    IGG3 58  --   --   --   --   --   --   --   --    IGG4 13  --   --   --   --   --   --   --   --        Metabolic Studies    Recent Labs   Lab Test 12/03/19  1038 12/01/19  0818 11/26/19  0825 11/23/19  1004 11/21/19  1228 11/19/19  1236  11/09/19  1800  11/06/19  1051  10/20/19  1736 10/20/19  0359    141 141 141 140 142    < >  --    < > 139   < >  --  136   POTASSIUM 3.8 3.6 4.2 4.2 3.6 4.1   < >  --    < > 3.9   < >  --  3.0*   CHLORIDE 109 109 110* 110* 110* 111*   < >  --    < > 107   < >  --  105   CO2 27 27 28 27 24 27   < >  --    < > 27   < >  --  23   ANIONGAP 4 5 3 4 6 4   < >  --    < > 6   < >  --  8   BUN 12 14 17 14 14 18   < >  --    < > 12   < >  --  16   CR 0.68 0.78 0.72 0.69 0.57* 0.60*   < >  --    < > 0.81   < >  --  0.79   GFRESTIMATED >90 >90 >90 >90 >90 >90   < >  --    < > >90   < >  --  >90   * 95 82 90 106* 94   < >  --    < > 92   < >  --  92   ZHEN 8.8 8.9 8.9 8.6 8.4* 8.4*   < >  --    < > 9.0   < >  --  8.1*   PHOS  --   --   --   --   --   --   --   --   --  3.5  --   --  3.2   MAG  --   --  2.1  --   --   --   --   --   --  2.2  --   --  1.9   LACT  --   --   --   --   --   --   --  1.6  --   --   --  1.7  --     < > = values in this interval not displayed.       Hepatic Studies    Recent Labs   Lab Test 12/01/19  0818 11/26/19  0825 11/21/19  1228 11/19/19  1236 11/11/19  1744 11/10/19  0413 11/06/19  1051   BILITOTAL 0.9 0.8 0.8 0.7  --  0.8 0.8   ALKPHOS 103 114 140 145  --  101 124   PROTTOTAL 7.2 6.8 6.6* 6.2*  --  6.0* 7.5   ALBUMIN 3.6 3.5 3.3* 3.0*  --  2.6* 3.7   AST 11 9 16 17  --  10 14   ALT 21 25 38 35  --  17 24   LDH  --   --   --   --  225 282* 196       Hematology Studies     Recent Labs   Lab Test 12/03/19  1038 12/01/19  1035 12/01/19  0818 11/26/19  0825 11/23/19  1004 11/21/19  1228 11/19/19  1236   WBC 4.2  --  1.5* 1.7* 5.7 2.2* 2.7*   ANEU 3.4  --  0.6* 0.4* 3.7 0.7* 1.5*   ALYM 0.4*  --  0.3* 0.8 1.0 0.8 0.7*   NEGAR 0.1  --  0.2 0.2 0.4 0.3 0.1   AEOS 0.3  --  0.3 0.2 0.4 0.4 0.3   HGB 7.5*  --  8.4* 8.7* 8.6* 8.8* 8.0*   HCT 22.5*  --  25.7* 26.7* 26.4* 26.6* 25.1*   PLT 10* 20* 8* 13* 19* 10* 17*       Clotting Studies    Recent Labs   Lab Test 11/15/19  1256 11/14/19  0404 11/12/19  0318 11/09/19  1800 10/16/19  1938 09/24/19  0149 07/22/19  1320   INR 1.17* 1.24*  1.27* 1.40* 1.27* 1.19*  --    PTT  --   --   --  49* 43* 42* 40*       Urine Studies    Recent Labs   Lab Test 11/10/19  1410 11/08/19  1148 09/25/19  0822 09/23/19  1757 07/21/19 2013   URINEPH 6.0 5.0 7.5* 6.0 6.0   NITRITE Negative Negative Negative Negative Negative   LEUKEST Negative Negative Negative Negative Negative   WBCU 2 3 <1 1 1         Microbiology:  Last 6 Culture results with specimen source  Culture Micro   Date Value Ref Range Status   11/16/2019 No VRE isolated  Final   11/16/2019 Light growth  Normal brayan    Final   11/16/2019 Moderate growth  Enterococcus faecalis   (A)  Final   11/16/2019   Preliminary    Culture received and in progress.  Positive AFB results are called as soon as detected.    Final report to follow in 7 to 8 weeks.     11/16/2019   Preliminary    Assayed at [facility] [address] [place] [id number] [phone]   11/16/2019 No yeast isolated  Final    Specimen Description   Date Value Ref Range Status   11/16/2019 Swab  Final   11/16/2019 Sputum  Final   11/16/2019 Sputum  Final   11/16/2019 Sputum  Final   11/16/2019 Sputum  Final   11/16/2019 Sputum  Final        Last check of C difficile  C Diff Toxin B PCR   Date Value Ref Range Status   05/24/2019 Negative NEG^Negative Final     Comment:     Negative: Clostridium difficile target DNA sequences NOT detected, presumed   negative for Clostridium difficile toxin B or the number of bacteria present   may be below the limit of detection for the test.  FDA approved assay performed using [employer] GeneXpert real-time PCR.  A negative result does not exclude actual disease due to Clostridium difficile   and may be due to improper collection, handling and storage of the specimen   or the number of organisms in the specimen is below the detection limit of the   assay.           Virology:  CMV viral loads    CMV viral loads  No results found for: 36756, 42890, 47979, 38163  CMV viral loads    Recent Labs   Lab Test  11/12/19  0318 10/16/19  0930   CSPEC EDTA PLASMA Plasma, EDTA anticoagulant   CMVLOG Not Calculated Not Calculated       CMV viral loads    Log IU/mL of CMVQNT   Date Value Ref Range Status   11/12/2019 Not Calculated <2.1 [Log_IU]/mL Final   10/16/2019 Not Calculated <2.1 [Log_IU]/mL Final   10/08/2019 Not Calculated <2.1 [Log_IU]/mL Final   09/25/2019 Not Calculated <2.1 [Log_IU]/mL Final   08/23/2019 Not Calculated <2.1 [Log_IU]/mL Final   08/06/2019 Not Calculated <2.1 [Log_IU]/mL Final   07/15/2019 Not Calculated <2.1 [Log_IU]/mL Final   07/08/2019 Not Calculated <2.1 [Log_IU]/mL Final   07/01/2019 Not Calculated <2.1 [Log_IU]/mL Final   06/24/2019 Not Calculated <2.1 [Log_IU]/mL Final   06/17/2019 Not Calculated <2.1 [Log_IU]/mL Final   06/07/2019 Not Calculated <2.1 [Log_IU]/mL Final   05/29/2019 Not Calculated <2.1 [Log_IU]/mL Final   05/17/2019 Not Calculated <2.1 [Log_IU]/mL Final       CMV resistance testing  No lab results found.  No results found for: CMVCID, CMVFOS, CMVGAN     EBV viral loads     Recent Labs   Lab Test 11/19/19  1236 11/12/19  0318 10/16/19  0930 10/08/19  1301 09/25/19  0810   EBRES EBV DNA Not Detected <500* 2,725* 1,203* 2,215*     EBV DNA Copies/mL   Date Value Ref Range Status   11/19/2019 EBV DNA Not Detected EBVNEG^EBV DNA Not Detected [Copies]/mL Final   11/12/2019 <500 (A) EBVNEG^EBV DNA Not Detected [Copies]/mL Final     Comment:     EBV DNA Detected below the reportable range of 500 Copies/mL   10/16/2019 2,725 (A) EBVNEG^EBV DNA Not Detected [Copies]/mL Final   10/08/2019 1,203 (A) EBVNEG^EBV DNA Not Detected [Copies]/mL Final   09/25/2019 2,215 (A) EBVNEG^EBV DNA Not Detected [Copies]/mL Final       Human Herpes Virus 6 viral loads    HHV6 DNA Result   Date Value Ref Range Status   11/12/2019 No HHV6 DNA detected NOHHV^No HHV6 DNA detected Copies/mL Final      HHV6 DNA Specimen Type   Date Value Ref Range Status   11/12/2019 Whole blood, EDTA anticoagulant  Final        CMV IgG Antibody   Date Value Ref Range Status   01/05/2005 >160.0 EU/mL Final     Comment:     Positive for anti-CMV IgG     Herpes Simplex IgG Antibody Immune Status Ratio   Date Value Ref Range Status   02/04/2005 1.21  Final     Herpes Simplex IgG Antibody Interpretation   Date Value Ref Range Status   02/04/2005 Positive, suggests immunologic exposure.  Final     No results found for: EBIG2, EBIGM, EBVIGG, EBIGG, EBVAGN, CU2496, TOXG    Pathology:  Right axillary LN biopsy 11/15/2019  INTERPRETATION:   Culture of this specimen yielded only 6 metaphase cells for analysis,   among which no clonal chromosomal   abnormality was found. However, this limited number of metaphases is   insufficient to rule out the presence of   a clonal chromosomal abnormality.       Imaging:  CT chest 11/15/2019   IMPRESSION:   1. New groundglass opacities greatest in the upper lobes concerning  for infection.  2. Increased prominence of the interstitium suggesting interstitial  edema and/or component of infection.  3. Small bilateral pleural effusions.  4. Mildly prominent mediastinal lymph nodes are similar to slightly  decreased from 11/9/2019. Post surgical changes of right axillary  jami dissection.    CT c/a/p 11/9/2019  IMPRESSION: In this patient with history of multiple myeloma status  post bone marrow transplant:  1. No specific findings to explain patient's fever of unknown origin.   2. There is a 1.6 cm hypoenhancing ill-defined focus in the superior  pole the right kidney. This is thought to represent sequela of prior  inflammation. Occult malignancy is thought less likely but not  entirely excluded. Recommend follow-up MRI in outpatient setting.  3. The spine is severely demineralized with multilevel compression  fractures which are stable.  4. Stable bilateral axillary, mediastinal, and superficial inguinal  lymphadenopathy.  5. Stable small peripheral ill-defined hypodensities within the  spleen, nonspecific and  may be infarctions.

## 2019-12-03 NOTE — NURSING NOTE
Chief Complaint   Patient presents with     RECHECK     follow up     Blood pressure (!) 157/91, pulse 80, temperature 98.4  F (36.9  C), temperature source Oral, weight 76.4 kg (168 lb 8 oz), SpO2 100 %.    Octavio Oliver/JORGE LUIS  December 3, 2019 2:37 PM

## 2019-12-04 LAB — INTERPRETATION ECG - MUSE: NORMAL

## 2019-12-05 LAB
ABO + RH BLD: NORMAL
ABO + RH BLD: NORMAL
BLD GP AB SCN SERPL QL: NORMAL
BLD PROD TYP BPU: NORMAL
BLOOD BANK CMNT PATIENT-IMP: NORMAL
LAB SCANNED RESULT: NORMAL
NUM BPU REQUESTED: 2
SPECIMEN EXP DATE BLD: NORMAL

## 2019-12-06 ENCOUNTER — INFUSION THERAPY VISIT (OUTPATIENT)
Dept: TRANSPLANT | Facility: CLINIC | Age: 59
End: 2019-12-06
Attending: STUDENT IN AN ORGANIZED HEALTH CARE EDUCATION/TRAINING PROGRAM
Payer: COMMERCIAL

## 2019-12-06 ENCOUNTER — APPOINTMENT (OUTPATIENT)
Dept: LAB | Facility: CLINIC | Age: 59
End: 2019-12-06
Attending: STUDENT IN AN ORGANIZED HEALTH CARE EDUCATION/TRAINING PROGRAM
Payer: COMMERCIAL

## 2019-12-06 VITALS
DIASTOLIC BLOOD PRESSURE: 61 MMHG | SYSTOLIC BLOOD PRESSURE: 118 MMHG | TEMPERATURE: 98.4 F | HEART RATE: 75 BPM | OXYGEN SATURATION: 95 % | RESPIRATION RATE: 16 BRPM

## 2019-12-06 VITALS
OXYGEN SATURATION: 99 % | HEART RATE: 109 BPM | RESPIRATION RATE: 16 BRPM | TEMPERATURE: 100.2 F | SYSTOLIC BLOOD PRESSURE: 130 MMHG | BODY MASS INDEX: 23.75 KG/M2 | WEIGHT: 169.3 LBS | DIASTOLIC BLOOD PRESSURE: 75 MMHG

## 2019-12-06 DIAGNOSIS — C90.01 MULTIPLE MYELOMA IN REMISSION (H): ICD-10-CM

## 2019-12-06 DIAGNOSIS — L40.50 PSORIASIS WITH ARTHROPATHY (H): Primary | ICD-10-CM

## 2019-12-06 LAB
ANION GAP SERPL CALCULATED.3IONS-SCNC: 10 MMOL/L (ref 3–14)
BASOPHILS # BLD AUTO: 0 10E9/L (ref 0–0.2)
BASOPHILS NFR BLD AUTO: 0 %
BLD PROD TYP BPU: NORMAL
BLD UNIT ID BPU: 0
BLOOD PRODUCT CODE: NORMAL
BPU ID: NORMAL
BUN SERPL-MCNC: 15 MG/DL (ref 7–30)
CALCIUM SERPL-MCNC: 8.8 MG/DL (ref 8.5–10.1)
CHLORIDE SERPL-SCNC: 101 MMOL/L (ref 94–109)
CO2 SERPL-SCNC: 22 MMOL/L (ref 20–32)
COPATH REPORT: NORMAL
CREAT SERPL-MCNC: 0.91 MG/DL (ref 0.66–1.25)
DIFFERENTIAL METHOD BLD: ABNORMAL
EOSINOPHIL # BLD AUTO: 0.2 10E9/L (ref 0–0.7)
EOSINOPHIL NFR BLD AUTO: 8.4 %
ERYTHROCYTE [DISTWIDTH] IN BLOOD BY AUTOMATED COUNT: 23 % (ref 10–15)
GFR SERPL CREATININE-BSD FRML MDRD: 90 ML/MIN/{1.73_M2}
GLUCOSE SERPL-MCNC: 132 MG/DL (ref 70–99)
HCT VFR BLD AUTO: 25 % (ref 40–53)
HGB BLD-MCNC: 8.4 G/DL (ref 13.3–17.7)
IMM GRANULOCYTES # BLD: 0 10E9/L (ref 0–0.4)
IMM GRANULOCYTES NFR BLD: 1.5 %
LYMPHOCYTES # BLD AUTO: 0.4 10E9/L (ref 0.8–5.3)
LYMPHOCYTES NFR BLD AUTO: 15.3 %
MCH RBC QN AUTO: 31.8 PG (ref 26.5–33)
MCHC RBC AUTO-ENTMCNC: 33.6 G/DL (ref 31.5–36.5)
MCV RBC AUTO: 95 FL (ref 78–100)
MONOCYTES # BLD AUTO: 0.4 10E9/L (ref 0–1.3)
MONOCYTES NFR BLD AUTO: 16 %
NEUTROPHILS # BLD AUTO: 1.5 10E9/L (ref 1.6–8.3)
NEUTROPHILS NFR BLD AUTO: 58.8 %
NRBC # BLD AUTO: 0 10*3/UL
NRBC BLD AUTO-RTO: 0 /100
PLATELET # BLD AUTO: 14 10E9/L (ref 150–450)
POTASSIUM SERPL-SCNC: 3.8 MMOL/L (ref 3.4–5.3)
RBC # BLD AUTO: 2.64 10E12/L (ref 4.4–5.9)
SODIUM SERPL-SCNC: 133 MMOL/L (ref 133–144)
TRANSFUSION STATUS PATIENT QL: NORMAL
WBC # BLD AUTO: 2.6 10E9/L (ref 4–11)

## 2019-12-06 PROCEDURE — 85025 COMPLETE CBC W/AUTO DIFF WBC: CPT | Performed by: PHYSICIAN ASSISTANT

## 2019-12-06 PROCEDURE — 86900 BLOOD TYPING SEROLOGIC ABO: CPT | Performed by: STUDENT IN AN ORGANIZED HEALTH CARE EDUCATION/TRAINING PROGRAM

## 2019-12-06 PROCEDURE — 80048 BASIC METABOLIC PNL TOTAL CA: CPT | Performed by: PHYSICIAN ASSISTANT

## 2019-12-06 PROCEDURE — G0463 HOSPITAL OUTPT CLINIC VISIT: HCPCS | Mod: 25

## 2019-12-06 PROCEDURE — 86923 COMPATIBILITY TEST ELECTRIC: CPT | Performed by: STUDENT IN AN ORGANIZED HEALTH CARE EDUCATION/TRAINING PROGRAM

## 2019-12-06 PROCEDURE — 36430 TRANSFUSION BLD/BLD COMPNT: CPT

## 2019-12-06 PROCEDURE — 86901 BLOOD TYPING SEROLOGIC RH(D): CPT | Performed by: STUDENT IN AN ORGANIZED HEALTH CARE EDUCATION/TRAINING PROGRAM

## 2019-12-06 PROCEDURE — 86850 RBC ANTIBODY SCREEN: CPT | Performed by: STUDENT IN AN ORGANIZED HEALTH CARE EDUCATION/TRAINING PROGRAM

## 2019-12-06 PROCEDURE — P9037 PLATE PHERES LEUKOREDU IRRAD: HCPCS | Performed by: PHYSICIAN ASSISTANT

## 2019-12-06 RX ORDER — ACYCLOVIR 800 MG/1
800 TABLET ORAL 2 TIMES DAILY
Qty: 60 TABLET | Refills: 1 | Status: SHIPPED | OUTPATIENT
Start: 2019-12-06 | End: 2019-12-09

## 2019-12-06 ASSESSMENT — PAIN SCALES - GENERAL: PAINLEVEL: NO PAIN (0)

## 2019-12-06 NOTE — PROGRESS NOTES
"Infusion Nursing Note:  Angel Yanez presents today for transfusion of platelets HX: MM    Patient seen by provider today: Yes: Mony ORTEGA   present during visit today: Not Applicable.    Note: Pt arrived, ambulatory, in care of self.  Pt reports being quite fatigued (per usual) and frustrated with this process of \"being sick\".  Pt with fever of 103.6/chills overnight.  Pt with current temp of 100.2 while in lab today.  BC obtained by lab RN.  Pt denies nausea or pain at this time.  PT wanting to discuss use of Itroconazole/Levaquin with provider.  Provider at bedside to assess pt.  Decision made to continue with ABX's and use of Celebrex - as ordered.  Provider order received to transfuse 1 unit of plts today for current count of 14K..    Intravenous Access:  Peripheral IV placed.  Removed upon completion of use today.  Pressure wrap applied to right forearm.    Treatment Conditions:  Results reviewed, labs did NOT meet treatment parameters: Above transfusion parameter.  Provider verbal order received to transfuse 1 unit plts today.  Pt to return to clinic on Monday for next appointment.      Post Infusion Assessment:  Patient tolerated transfusion of platelets without evidence of reaction.      Discharge Plan:   Patient discharged in stable condition accompanied by: self.  Pt to return to BMT clinic next Monday for similar cares.    Emelina Bose RN                        "

## 2019-12-06 NOTE — PROGRESS NOTES
BMT Clinic Note   12/06/2019    Patient ID:  Angel Yanez is a 59 year old male, currently day +203 s/p auto PBSCT.     Diagnosis MM Multiple myeloma  HCT Type Autologous    Prep Regimen Cytoxan  Melphalan   Primary BMT Provider Dr. Lucio     INTERVAL  HISTORY   Guille returns for clinic follow up. Doing stably well. Denies any new n/v/d, no new cough or SOB. No rashes, bruises or bleeding. No light headedness or dizziness. Has fever record with him, states he sometimes gets slight chills when his temp is normal but then 1-2 hours later may spike. He carl any additional symptoms when fevering (no seizures, vision changes or headaches).    Review of Systems:  as noted above    PHYSICAL EXAM     Blood pressure 130/75, pulse 109, temperature 100.2  F (37.9  C), temperature source Oral, resp. rate 16, weight 76.8 kg (169 lb 4.8 oz), SpO2 99 %.    KPS:  90  General: NAD   ENT: sclera anicteric  Lungs: CTAB  Cardiovascular: RRR, no M/R/G   Abdominal/Rectal: +BS, soft, NT, ND  Lymphatics: no edema  Skin: faint petechial like rash on torso 12/6  Neuro: A&O   Access: right arm peripheral IV    Labs:  Lab Results   Component Value Date    WBC 2.6 (L) 12/06/2019    ANEU 1.5 (L) 12/06/2019    HGB 8.4 (L) 12/06/2019    HCT 25.0 (L) 12/06/2019    PLT 14 (LL) 12/06/2019     12/06/2019    POTASSIUM 3.8 12/06/2019    CHLORIDE 101 12/06/2019    CO2 22 12/06/2019     (H) 12/06/2019    BUN 15 12/06/2019    CR 0.91 12/06/2019    MAG 2.1 11/26/2019    INR 1.17 (H) 11/15/2019    BILITOTAL 0.9 12/01/2019    AST 11 12/01/2019    ALT 21 12/01/2019    ALKPHOS 103 12/01/2019    PROTTOTAL 7.2 12/01/2019    ALBUMIN 3.6 12/01/2019       ASSESSMENT/PLAN   Angel Yanez is a 60 yo man D+203 s/p 2nd autologous transplant for MM.   S/p 2 readmissions for FUO (10/16-10/23; 9/23-9/29/19).      1.  BMT/MM:   Slow engraftment; cell dose was only 0.639x10^6. ([facility] [address] [facility] - known poor cell dose as failed chemo-mobilization  1/2019.)   - Stringent CR.  - day +180 bone marrow biopsy 11/6 which showed no morphologic or immunophenotypic evidence of plasma cell neoplasm. His light chains were not elevated and he had no monoclonal protein in the serum.  Thus he is in clinical remission.  - PET in 8/2019 clear.   - PB FLOW 11/23: No overt aberrant immunophenotype on T cells.  Rare to absent mature B cells and plasma cells.      2.  HEME: Keep Hgb>8g/dL, plt >20 (epistaxis requiring rhinorocket packing in last admission).   - plts only today 14k, no bleeding per pt  - anemic, thrombocytopenic post BMT, low cell dose, +/- infection.   - intermittent neutropenia: give prn ANC </=1000  - No evidence of HLH on lymphnode bx 10/18/19.     - Persistent thrombocytopenia: BM 11/6/19 showed adequate granulopoiesis and erythropoiesis but his platelet precursors were 0 to absent which is reflected in peripheral CBC. Started promactic 50mcg daily on 11/10. Has been titrated up to 150mg daily (started 11/23); no change. Pt will complete Rx this week then stop.  - Hemolysis eval neg     3.  ID: Fevers to 103.1 the last 3 days on celebrex 2x/day, tylenol 500mg 1-2x/day. Blood cultures drawn automatically, not likely a new process, not anticipating anything new to be revealed.  Hx FUO  A.) Extensive ID work up unremarkable x 2 sept and oct. 10/16 and 11/9 repeat chest CT: persistent mediastinal adenopathy no paranchymal disease. S/P bx of L axillary node on 10/18: No evidence of HLH, tissues looks reactive.   - s/p excisional R axillary lymph node bx 11/15/19 (neg core bx 10/18 L axillary). Bx sent to [facility] 11/26 - no suspicious findings as of 12/6.  - HTLV neg with very low lymphocyte count. CD4 count 146.  - Per ID with negative w/u and not neutropenic discontinued cefepime 11/12. 11/15 CT Chest new GGO. Resumed cefepime cx's pending. RVP neg. Pulm consulted. No indication for BAL or further treatment/testing. Completed a zpak 11/21.   - Per ID, possible  histoplasmosis: empiric itraconazole 200 TID started last admission with fevers initially resolved but now recurred; currently on bid dosing. 10/18 Histo/blasto urine antigen negative. Per Dr Kelley flucaonzole can suppress growth? 10/18 Kairus DNA test- negative. 1/12 itra level=1.6.    Right LNTissue culture (11/15): positive for stenotrophomonas malophilia and Ochrobactrum anthropi from broth only. Sensitive to bactrim and levaquin. Given culture + in broth only, this is likely contaminant but given FUO will treat empirically.   - Levaquin 750mg daily 11/26, stop today 12/6 as no impact to fever trends. Avoided bactrim given ongoing pancytopenia.   - As per ID's note 12/3 and after discussion with Dr Lucio, could stop itraconazole next week if fevers persist as it is not proving to have any effect on his temperatures  B.) Autoimmune workup so far negative. Rheum consult, last note on 11/14: felt no evidence of autoimmune disease flair neg studies above but recommended evaluate possible granulomatous finding with lymph nose biopsy to r/o sarcoidosis in the setting of FUO and elevated soluble IL-2 RA--      C.) Malignancy W/U: Neg BM BX 11/6. Neg PET in 8/2019. Neg core lymph node needle bx 10/18. PSA neg. Admit CT with R kidney hypoenhancing ill-defined focus. MRI 11/13 not suggestive of urothelial or renal cell carcinoma.   R axillary LN path still pending as of 12/3 - sent to [facility] (Dr. Dubon) for review as complicated/inconclusive here.     Prophy: Itraconazole, ACV, pentamidine (11/19)  - influenza vaccination given 11/26/19    4. Pulm:   - tachypnea/SOB: mainly with fevers. On RA. Imaging w/ contrast neg. Crackles and fluid up have been giving PRN lasix. CT Chest 11/15 new GGO and small effusions. Pulm consult as above. RVP neg. Cx pending. Completed Azithromycin 11/21   - 10/17: Echo with preserved EF, no evidence of right heart strain. Repeat echo 11/14 stable.  - 10/17 VQ scan neg for PE.     5. GI:  No  complaints.  - LFTs wnl 12/1    6.  FEN/Renal: Creat, lytes wnl.     7.  Mood: Continue Paxil.    8. Derm:   - recent rash on torso, now resolved. Derm consuledt. 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       Plan: Please do not admit without any new or compelling symptoms. Fevers have been well worked up, and pt is not currently neutropenic.   Plts today  Monday for labs, provider, possible blood products    Mony Pitts PAC  [phone]

## 2019-12-06 NOTE — NURSING NOTE
Chief Complaint   Patient presents with     Blood Draw     labs drawn with IV start by rn.  vital signs taken.     Labs drawn with IV start by RN in lab.  Vital signs taken.  Temp 100.2 in lab, pt reports up to 103 overnight.  Tony 1 set of blood cultures if needed, Anaergia МАРИНА paged.  Pt checked in to next appointments and brought over to infusion.    Caitlin Webster RN

## 2019-12-07 ENCOUNTER — TELEPHONE (OUTPATIENT)
Dept: ONCOLOGY | Facility: CLINIC | Age: 59
End: 2019-12-07

## 2019-12-07 NOTE — TELEPHONE ENCOUNTER
I was paged from Guille Yanez re/ fever 103 this am with nausea and vomiting once. Had fever 104 yesterday got broken with Celebrex.     I did talk to his wife and instructed her to to let him take Zofran then tylenol then call me back in 2-3 hours if there is no improvement.     I reviewed his not from yesterday and looks he has FUO and rec against admission if no new symptoms except fever. Wife is not interested in bringing him because this happened in the past and keeps spiking fever every 18 days.        Candace Sifuentes MD  Hematology&Oncology Fellow  Pager: [phone]

## 2019-12-08 LAB
ABO + RH BLD: NORMAL
ABO + RH BLD: NORMAL
BACTERIA SPEC CULT: NORMAL
BLD GP AB SCN SERPL QL: NORMAL
BLD PROD TYP BPU: NORMAL
BLOOD BANK CMNT PATIENT-IMP: NORMAL
Lab: NORMAL
NUM BPU REQUESTED: 2
SPECIMEN EXP DATE BLD: NORMAL
SPECIMEN SOURCE: NORMAL

## 2019-12-09 ENCOUNTER — APPOINTMENT (OUTPATIENT)
Dept: CT IMAGING | Facility: CLINIC | Age: 59
DRG: 864 | End: 2019-12-09
Attending: STUDENT IN AN ORGANIZED HEALTH CARE EDUCATION/TRAINING PROGRAM
Payer: COMMERCIAL

## 2019-12-09 ENCOUNTER — INFUSION THERAPY VISIT (OUTPATIENT)
Dept: TRANSPLANT | Facility: CLINIC | Age: 59
DRG: 864 | End: 2019-12-09
Attending: INTERNAL MEDICINE
Payer: COMMERCIAL

## 2019-12-09 ENCOUNTER — HOSPITAL ENCOUNTER (INPATIENT)
Facility: CLINIC | Age: 59
LOS: 3 days | Discharge: HOME | DRG: 864 | End: 2019-12-12
Attending: INTERNAL MEDICINE | Admitting: INTERNAL MEDICINE
Payer: COMMERCIAL

## 2019-12-09 ENCOUNTER — ANCILLARY PROCEDURE (OUTPATIENT)
Dept: ULTRASOUND IMAGING | Facility: CLINIC | Age: 59
End: 2019-12-09
Attending: STUDENT IN AN ORGANIZED HEALTH CARE EDUCATION/TRAINING PROGRAM
Payer: COMMERCIAL

## 2019-12-09 VITALS
DIASTOLIC BLOOD PRESSURE: 66 MMHG | OXYGEN SATURATION: 96 % | WEIGHT: 172.7 LBS | SYSTOLIC BLOOD PRESSURE: 100 MMHG | BODY MASS INDEX: 24.22 KG/M2 | TEMPERATURE: 98.2 F | HEART RATE: 107 BPM | RESPIRATION RATE: 16 BRPM

## 2019-12-09 VITALS
DIASTOLIC BLOOD PRESSURE: 79 MMHG | RESPIRATION RATE: 16 BRPM | SYSTOLIC BLOOD PRESSURE: 122 MMHG | OXYGEN SATURATION: 94 % | TEMPERATURE: 98.6 F | HEART RATE: 72 BPM

## 2019-12-09 DIAGNOSIS — N17.9 AKI (ACUTE KIDNEY INJURY) (H): ICD-10-CM

## 2019-12-09 DIAGNOSIS — R59.1 LYMPHADENOPATHY: ICD-10-CM

## 2019-12-09 DIAGNOSIS — A68.9 RECURRENT FEVER: ICD-10-CM

## 2019-12-09 DIAGNOSIS — C90.00 MULTIPLE MYELOMA NOT HAVING ACHIEVED REMISSION (H): ICD-10-CM

## 2019-12-09 DIAGNOSIS — C90.00 MULTIPLE MYELOMA NOT HAVING ACHIEVED REMISSION (H): Primary | ICD-10-CM

## 2019-12-09 DIAGNOSIS — C90.01 MULTIPLE MYELOMA IN REMISSION (H): Primary | ICD-10-CM

## 2019-12-09 DIAGNOSIS — C90.01 MULTIPLE MYELOMA IN REMISSION (H): ICD-10-CM

## 2019-12-09 DIAGNOSIS — L40.50 PSORIASIS WITH ARTHROPATHY (H): ICD-10-CM

## 2019-12-09 LAB
ALBUMIN SERPL-MCNC: 2.5 G/DL (ref 3.4–5)
ALBUMIN SERPL-MCNC: 2.5 G/DL (ref 3.4–5)
ALP SERPL-CCNC: 190 U/L (ref 40–150)
ALP SERPL-CCNC: 224 U/L (ref 40–150)
ALT SERPL W P-5'-P-CCNC: 17 U/L (ref 0–70)
ALT SERPL W P-5'-P-CCNC: 18 U/L (ref 0–70)
ANION GAP SERPL CALCULATED.3IONS-SCNC: 10 MMOL/L (ref 3–14)
ANION GAP SERPL CALCULATED.3IONS-SCNC: 12 MMOL/L (ref 3–14)
ANISOCYTOSIS BLD QL SMEAR: ABNORMAL
ANISOCYTOSIS BLD QL SMEAR: SLIGHT
APTT PPP: 47 SEC (ref 22–37)
AST SERPL W P-5'-P-CCNC: 4 U/L (ref 0–45)
AST SERPL W P-5'-P-CCNC: <3 U/L (ref 0–45)
BASOPHILS # BLD AUTO: 0 10E9/L (ref 0–0.2)
BASOPHILS # BLD AUTO: 0 10E9/L (ref 0–0.2)
BASOPHILS NFR BLD AUTO: 0 %
BASOPHILS NFR BLD AUTO: 0 %
BILIRUB SERPL-MCNC: 1.7 MG/DL (ref 0.2–1.3)
BILIRUB SERPL-MCNC: 1.8 MG/DL (ref 0.2–1.3)
BLD PROD TYP BPU: NORMAL
BLD UNIT ID BPU: 0
BLOOD PRODUCT CODE: NORMAL
BPU ID: NORMAL
BUN SERPL-MCNC: 42 MG/DL (ref 7–30)
BUN SERPL-MCNC: 50 MG/DL (ref 7–30)
C COLI+JEJUNI+LARI FUSA STL QL NAA+PROBE: NOT DETECTED
C DIFF TOX B STL QL: NEGATIVE
CALCIUM SERPL-MCNC: 8.6 MG/DL (ref 8.5–10.1)
CALCIUM SERPL-MCNC: 9.1 MG/DL (ref 8.5–10.1)
CHLORIDE SERPL-SCNC: 94 MMOL/L (ref 94–109)
CHLORIDE SERPL-SCNC: 97 MMOL/L (ref 94–109)
CO2 SERPL-SCNC: 22 MMOL/L (ref 20–32)
CO2 SERPL-SCNC: 25 MMOL/L (ref 20–32)
CREAT SERPL-MCNC: 2.26 MG/DL (ref 0.66–1.25)
CREAT SERPL-MCNC: 2.57 MG/DL (ref 0.66–1.25)
DIFFERENTIAL METHOD BLD: ABNORMAL
DIFFERENTIAL METHOD BLD: ABNORMAL
EC STX1 GENE STL QL NAA+PROBE: NOT DETECTED
EC STX2 GENE STL QL NAA+PROBE: NOT DETECTED
ENTERIC PATHOGEN COMMENT: NORMAL
EOSINOPHIL # BLD AUTO: 0.1 10E9/L (ref 0–0.7)
EOSINOPHIL # BLD AUTO: 0.1 10E9/L (ref 0–0.7)
EOSINOPHIL NFR BLD AUTO: 2.6 %
EOSINOPHIL NFR BLD AUTO: 6.2 %
ERYTHROCYTE [DISTWIDTH] IN BLOOD BY AUTOMATED COUNT: 21.3 % (ref 10–15)
ERYTHROCYTE [DISTWIDTH] IN BLOOD BY AUTOMATED COUNT: 24.5 % (ref 10–15)
FERRITIN SERPL-MCNC: 2835 NG/ML (ref 26–388)
FIBRINOGEN PPP-MCNC: 672 MG/DL (ref 200–420)
GFR SERPL CREATININE-BSD FRML MDRD: 26 ML/MIN/{1.73_M2}
GFR SERPL CREATININE-BSD FRML MDRD: 30 ML/MIN/{1.73_M2}
GLUCOSE SERPL-MCNC: 128 MG/DL (ref 70–99)
GLUCOSE SERPL-MCNC: 146 MG/DL (ref 70–99)
HCT VFR BLD AUTO: 22.1 % (ref 40–53)
HCT VFR BLD AUTO: 24 % (ref 40–53)
HGB BLD-MCNC: 7.6 G/DL (ref 13.3–17.7)
HGB BLD-MCNC: 8.2 G/DL (ref 13.3–17.7)
INR PPP: 1.53 (ref 0.86–1.14)
LACTATE BLD-SCNC: 2.4 MMOL/L (ref 0.7–2)
LYMPHOCYTES # BLD AUTO: 0.2 10E9/L (ref 0.8–5.3)
LYMPHOCYTES # BLD AUTO: 0.2 10E9/L (ref 0.8–5.3)
LYMPHOCYTES NFR BLD AUTO: 6.1 %
LYMPHOCYTES NFR BLD AUTO: 9.7 %
MACROCYTES BLD QL SMEAR: PRESENT
MCH RBC QN AUTO: 31.9 PG (ref 26.5–33)
MCH RBC QN AUTO: 32.5 PG (ref 26.5–33)
MCHC RBC AUTO-ENTMCNC: 34.2 G/DL (ref 31.5–36.5)
MCHC RBC AUTO-ENTMCNC: 34.4 G/DL (ref 31.5–36.5)
MCV RBC AUTO: 93 FL (ref 78–100)
MCV RBC AUTO: 94 FL (ref 78–100)
METAMYELOCYTES # BLD: 0 10E9/L
METAMYELOCYTES NFR BLD MANUAL: 0.9 %
MONOCYTES # BLD AUTO: 0 10E9/L (ref 0–1.3)
MONOCYTES # BLD AUTO: 0.2 10E9/L (ref 0–1.3)
MONOCYTES NFR BLD AUTO: 0.9 %
MONOCYTES NFR BLD AUTO: 4.3 %
NEUTROPHILS # BLD AUTO: 1.9 10E9/L (ref 1.6–8.3)
NEUTROPHILS # BLD AUTO: 3 10E9/L (ref 1.6–8.3)
NEUTROPHILS NFR BLD AUTO: 82.3 %
NEUTROPHILS NFR BLD AUTO: 87 %
NOROV GI+II ORF1-ORF2 JNC STL QL NAA+PR: NOT DETECTED
OVALOCYTES BLD QL SMEAR: SLIGHT
PLATELET # BLD AUTO: 20 10E9/L (ref 150–450)
PLATELET # BLD AUTO: 4 10E9/L (ref 150–450)
PLATELET # BLD EST: ABNORMAL 10*3/UL
POIKILOCYTOSIS BLD QL SMEAR: SLIGHT
POLYCHROMASIA BLD QL SMEAR: SLIGHT
POTASSIUM SERPL-SCNC: 3.4 MMOL/L (ref 3.4–5.3)
POTASSIUM SERPL-SCNC: 4 MMOL/L (ref 3.4–5.3)
PROT SERPL-MCNC: 5.7 G/DL (ref 6.8–8.8)
PROT SERPL-MCNC: 6.2 G/DL (ref 6.8–8.8)
RBC # BLD AUTO: 2.34 10E12/L (ref 4.4–5.9)
RBC # BLD AUTO: 2.57 10E12/L (ref 4.4–5.9)
RVA NSP5 STL QL NAA+PROBE: NOT DETECTED
SALMONELLA SP RPOD STL QL NAA+PROBE: NOT DETECTED
SHIGELLA SP+EIEC IPAH STL QL NAA+PROBE: NOT DETECTED
SODIUM SERPL-SCNC: 127 MMOL/L (ref 133–144)
SODIUM SERPL-SCNC: 132 MMOL/L (ref 133–144)
SPECIMEN SOURCE: NORMAL
TRANSFUSION STATUS PATIENT QL: NORMAL
TRIGL SERPL-MCNC: 156 MG/DL
V CHOL+PARA RFBL+TRKH+TNAA STL QL NAA+PR: NOT DETECTED
WBC # BLD AUTO: 2.3 10E9/L (ref 4–11)
WBC # BLD AUTO: 3.5 10E9/L (ref 4–11)
Y ENTERO RECN STL QL NAA+PROBE: NOT DETECTED

## 2019-12-09 PROCEDURE — 87103 BLOOD FUNGUS CULTURE: CPT | Performed by: INTERNAL MEDICINE

## 2019-12-09 PROCEDURE — 36415 COLL VENOUS BLD VENIPUNCTURE: CPT | Performed by: INTERNAL MEDICINE

## 2019-12-09 PROCEDURE — 20600000 ZZH R&B BMT

## 2019-12-09 PROCEDURE — 25800030 ZZH RX IP 258 OP 636: Performed by: INTERNAL MEDICINE

## 2019-12-09 PROCEDURE — 87506 IADNA-DNA/RNA PROBE TQ 6-11: CPT | Performed by: PHYSICIAN ASSISTANT

## 2019-12-09 PROCEDURE — 87493 C DIFF AMPLIFIED PROBE: CPT | Performed by: PHYSICIAN ASSISTANT

## 2019-12-09 PROCEDURE — 86900 BLOOD TYPING SEROLOGIC ABO: CPT | Performed by: INTERNAL MEDICINE

## 2019-12-09 PROCEDURE — P9040 RBC LEUKOREDUCED IRRADIATED: HCPCS | Performed by: STUDENT IN AN ORGANIZED HEALTH CARE EDUCATION/TRAINING PROGRAM

## 2019-12-09 PROCEDURE — 25000132 ZZH RX MED GY IP 250 OP 250 PS 637: Mod: ZF | Performed by: STUDENT IN AN ORGANIZED HEALTH CARE EDUCATION/TRAINING PROGRAM

## 2019-12-09 PROCEDURE — P9037 PLATE PHERES LEUKOREDU IRRAD: HCPCS | Performed by: PHYSICIAN ASSISTANT

## 2019-12-09 PROCEDURE — 83605 ASSAY OF LACTIC ACID: CPT | Performed by: INTERNAL MEDICINE

## 2019-12-09 PROCEDURE — 80053 COMPREHEN METABOLIC PANEL: CPT | Performed by: INTERNAL MEDICINE

## 2019-12-09 PROCEDURE — 80053 COMPREHEN METABOLIC PANEL: CPT | Performed by: STUDENT IN AN ORGANIZED HEALTH CARE EDUCATION/TRAINING PROGRAM

## 2019-12-09 PROCEDURE — 85730 THROMBOPLASTIN TIME PARTIAL: CPT | Performed by: STUDENT IN AN ORGANIZED HEALTH CARE EDUCATION/TRAINING PROGRAM

## 2019-12-09 PROCEDURE — 82728 ASSAY OF FERRITIN: CPT | Performed by: INTERNAL MEDICINE

## 2019-12-09 PROCEDURE — 25000128 H RX IP 250 OP 636: Performed by: INTERNAL MEDICINE

## 2019-12-09 PROCEDURE — 86850 RBC ANTIBODY SCREEN: CPT | Performed by: INTERNAL MEDICINE

## 2019-12-09 PROCEDURE — 25000132 ZZH RX MED GY IP 250 OP 250 PS 637: Performed by: STUDENT IN AN ORGANIZED HEALTH CARE EDUCATION/TRAINING PROGRAM

## 2019-12-09 PROCEDURE — 86901 BLOOD TYPING SEROLOGIC RH(D): CPT | Performed by: INTERNAL MEDICINE

## 2019-12-09 PROCEDURE — 85384 FIBRINOGEN ACTIVITY: CPT | Performed by: STUDENT IN AN ORGANIZED HEALTH CARE EDUCATION/TRAINING PROGRAM

## 2019-12-09 PROCEDURE — 36415 COLL VENOUS BLD VENIPUNCTURE: CPT | Performed by: STUDENT IN AN ORGANIZED HEALTH CARE EDUCATION/TRAINING PROGRAM

## 2019-12-09 PROCEDURE — 84478 ASSAY OF TRIGLYCERIDES: CPT | Performed by: INTERNAL MEDICINE

## 2019-12-09 PROCEDURE — 87040 BLOOD CULTURE FOR BACTERIA: CPT | Performed by: INTERNAL MEDICINE

## 2019-12-09 PROCEDURE — 85610 PROTHROMBIN TIME: CPT | Performed by: STUDENT IN AN ORGANIZED HEALTH CARE EDUCATION/TRAINING PROGRAM

## 2019-12-09 PROCEDURE — 87633 RESP VIRUS 12-25 TARGETS: CPT | Performed by: STUDENT IN AN ORGANIZED HEALTH CARE EDUCATION/TRAINING PROGRAM

## 2019-12-09 PROCEDURE — 71250 CT THORAX DX C-: CPT

## 2019-12-09 PROCEDURE — 85025 COMPLETE CBC W/AUTO DIFF WBC: CPT | Performed by: INTERNAL MEDICINE

## 2019-12-09 PROCEDURE — 86923 COMPATIBILITY TEST ELECTRIC: CPT | Performed by: INTERNAL MEDICINE

## 2019-12-09 PROCEDURE — 85025 COMPLETE CBC W/AUTO DIFF WBC: CPT | Performed by: STUDENT IN AN ORGANIZED HEALTH CARE EDUCATION/TRAINING PROGRAM

## 2019-12-09 PROCEDURE — 25800030 ZZH RX IP 258 OP 636: Performed by: STUDENT IN AN ORGANIZED HEALTH CARE EDUCATION/TRAINING PROGRAM

## 2019-12-09 RX ORDER — POTASSIUM CHLORIDE 1.5 G/1.58G
20-40 POWDER, FOR SOLUTION ORAL
Status: DISCONTINUED | OUTPATIENT
Start: 2019-12-09 | End: 2019-12-12 | Stop reason: HOSPADM

## 2019-12-09 RX ORDER — PROCHLORPERAZINE MALEATE 5 MG
5 TABLET ORAL EVERY 6 HOURS PRN
Status: DISCONTINUED | OUTPATIENT
Start: 2019-12-09 | End: 2019-12-12 | Stop reason: HOSPADM

## 2019-12-09 RX ORDER — CELECOXIB 100 MG/1
100 CAPSULE ORAL 2 TIMES DAILY
Qty: 60 CAPSULE | Refills: 0 | Status: ON HOLD | COMMUNITY
Start: 2019-12-09 | End: 2019-12-12

## 2019-12-09 RX ORDER — SODIUM CHLORIDE 9 MG/ML
INJECTION, SOLUTION INTRAVENOUS CONTINUOUS
Status: DISCONTINUED | OUTPATIENT
Start: 2019-12-09 | End: 2019-12-12

## 2019-12-09 RX ORDER — ACETAMINOPHEN 325 MG/1
975 TABLET ORAL 2 TIMES DAILY
Status: DISCONTINUED | OUTPATIENT
Start: 2019-12-09 | End: 2019-12-12

## 2019-12-09 RX ORDER — ACETAMINOPHEN 325 MG/1
650 TABLET ORAL ONCE
Status: COMPLETED | OUTPATIENT
Start: 2019-12-09 | End: 2019-12-09

## 2019-12-09 RX ORDER — IPRATROPIUM BROMIDE AND ALBUTEROL SULFATE 2.5; .5 MG/3ML; MG/3ML
SOLUTION RESPIRATORY (INHALATION) ONCE
Status: DISCONTINUED | OUTPATIENT
Start: 2019-12-09 | End: 2019-12-09 | Stop reason: HOSPADM

## 2019-12-09 RX ORDER — TRIAMCINOLONE ACETONIDE 1 MG/G
CREAM TOPICAL 3 TIMES DAILY
Status: DISCONTINUED | OUTPATIENT
Start: 2019-12-09 | End: 2019-12-12 | Stop reason: HOSPADM

## 2019-12-09 RX ORDER — POTASSIUM CHLORIDE 29.8 MG/ML
20 INJECTION INTRAVENOUS
Status: DISCONTINUED | OUTPATIENT
Start: 2019-12-09 | End: 2019-12-12 | Stop reason: HOSPADM

## 2019-12-09 RX ORDER — ACETAMINOPHEN 325 MG/1
325-650 TABLET ORAL EVERY 4 HOURS PRN
Status: DISCONTINUED | OUTPATIENT
Start: 2019-12-09 | End: 2019-12-12 | Stop reason: HOSPADM

## 2019-12-09 RX ORDER — POTASSIUM CL/LIDO/0.9 % NACL 10MEQ/0.1L
10 INTRAVENOUS SOLUTION, PIGGYBACK (ML) INTRAVENOUS
Status: DISCONTINUED | OUTPATIENT
Start: 2019-12-09 | End: 2019-12-12 | Stop reason: HOSPADM

## 2019-12-09 RX ORDER — LORAZEPAM 2 MG/ML
.5-1 INJECTION INTRAMUSCULAR EVERY 4 HOURS PRN
Status: DISCONTINUED | OUTPATIENT
Start: 2019-12-09 | End: 2019-12-12 | Stop reason: HOSPADM

## 2019-12-09 RX ORDER — POTASSIUM CHLORIDE 7.45 MG/ML
10 INJECTION INTRAVENOUS
Status: DISCONTINUED | OUTPATIENT
Start: 2019-12-09 | End: 2019-12-12 | Stop reason: HOSPADM

## 2019-12-09 RX ORDER — MAGNESIUM SULFATE HEPTAHYDRATE 40 MG/ML
4 INJECTION, SOLUTION INTRAVENOUS EVERY 4 HOURS PRN
Status: DISCONTINUED | OUTPATIENT
Start: 2019-12-09 | End: 2019-12-12 | Stop reason: HOSPADM

## 2019-12-09 RX ORDER — ONDANSETRON 8 MG/1
8 TABLET, ORALLY DISINTEGRATING ORAL EVERY 6 HOURS PRN
Status: DISCONTINUED | OUTPATIENT
Start: 2019-12-09 | End: 2019-12-12 | Stop reason: HOSPADM

## 2019-12-09 RX ORDER — PAROXETINE 20 MG/1
20 TABLET, FILM COATED ORAL EVERY MORNING
Status: DISCONTINUED | OUTPATIENT
Start: 2019-12-10 | End: 2019-12-12 | Stop reason: HOSPADM

## 2019-12-09 RX ORDER — POTASSIUM CHLORIDE 750 MG/1
20-40 TABLET, EXTENDED RELEASE ORAL
Status: DISCONTINUED | OUTPATIENT
Start: 2019-12-09 | End: 2019-12-12 | Stop reason: HOSPADM

## 2019-12-09 RX ORDER — ACYCLOVIR 800 MG/1
800 TABLET ORAL 2 TIMES DAILY
Qty: 60 TABLET | Refills: 1 | Status: ON HOLD | COMMUNITY
Start: 2019-12-09 | End: 2019-12-12

## 2019-12-09 RX ORDER — LORAZEPAM 0.5 MG/1
.5-1 TABLET ORAL EVERY 4 HOURS PRN
Status: DISCONTINUED | OUTPATIENT
Start: 2019-12-09 | End: 2019-12-12 | Stop reason: HOSPADM

## 2019-12-09 RX ORDER — ONDANSETRON 2 MG/ML
8 INJECTION INTRAMUSCULAR; INTRAVENOUS EVERY 6 HOURS PRN
Status: DISCONTINUED | OUTPATIENT
Start: 2019-12-09 | End: 2019-12-12 | Stop reason: HOSPADM

## 2019-12-09 RX ADMIN — TRIAMCINOLONE ACETONIDE: 1 CREAM TOPICAL at 19:43

## 2019-12-09 RX ADMIN — ONDANSETRON 8 MG: 8 TABLET, ORALLY DISINTEGRATING ORAL at 21:16

## 2019-12-09 RX ADMIN — ACETAMINOPHEN 650 MG: 325 TABLET ORAL at 13:29

## 2019-12-09 RX ADMIN — SODIUM CHLORIDE 500 ML: 9 INJECTION, SOLUTION INTRAVENOUS at 22:30

## 2019-12-09 RX ADMIN — SODIUM CHLORIDE: 9 INJECTION, SOLUTION INTRAVENOUS at 18:17

## 2019-12-09 RX ADMIN — ACETAMINOPHEN 975 MG: 325 TABLET, FILM COATED ORAL at 19:41

## 2019-12-09 ASSESSMENT — ACTIVITIES OF DAILY LIVING (ADL)
ADLS_ACUITY_SCORE: 11
SWALLOWING: 0-->SWALLOWS FOODS/LIQUIDS WITHOUT DIFFICULTY
BATHING: 0-->INDEPENDENT
FALL_HISTORY_WITHIN_LAST_SIX_MONTHS: NO
AMBULATION: 0-->INDEPENDENT
RETIRED_COMMUNICATION: 0-->UNDERSTANDS/COMMUNICATES WITHOUT DIFFICULTY
RETIRED_EATING: 0-->INDEPENDENT
COGNITION: 0 - NO COGNITION ISSUES REPORTED
TOILETING: 0-->INDEPENDENT
TRANSFERRING: 0-->INDEPENDENT
DRESS: 0-->INDEPENDENT

## 2019-12-09 ASSESSMENT — PAIN SCALES - GENERAL: PAINLEVEL: NO PAIN (0)

## 2019-12-09 NOTE — NURSING NOTE
Chief Complaint   Patient presents with     Blood Draw     Labs drawn via PIV placed by RN in lab. VS taken. Pt checked in for next apt     Labs drawn from PIV placed by RN. Line flushed with saline. Vitals taken. Pt checked in for appointment(s).    .Leslie MAXWELL RN PHN BSN  BMT/Oncology Lab

## 2019-12-09 NOTE — NURSING NOTE
Admission SBAR:    Situation:  Why is the patient being admitted?  Shortness of Breath and renal insufficiency    Background:  BP 92/60   Pulse 88   Temp 98.4  F (36.9  C)   Resp 18   SpO2 96%   Major diagnosis: Multiple Myeloma  Type of donor: Autologous  Type of stem cells: PBSC  Relapsed? No  Summary of Interventions (if medications were administered, please specify time and color of lumen if applicable):    Antimicrobials? No    Transfusions? Yes- Platelets and PRBCs    Fluids? No    Neupogen? No    Other Medications? No    Electrolytes? No    Blood cultures? No    UA? No    UC? No    Stool culture? yes    Respiratory cultures? Yes: Respiratory viruses    Current type and cross? Yes    Completed preadmission procedures: renal US done    Procedures due: none    If procedures are scheduled, what time? Not Applicable    Assessment:  Potential Falls risk? no  Accompanied by: son  Other Assessment: Not Applicable    Recommendations: Admit for further evaluation  Report called to Unit: 5C  Report called to Nurse: Emelina HUERTA  Transported by: family  Via: Walking, private car

## 2019-12-09 NOTE — PROGRESS NOTES
"BMT Clinic Note   12/09/2019    Patient ID:  Angel Yanez is a 59 year old male, currently day +206 s/p auto PBSCT.     Diagnosis MM Multiple myeloma  HCT Type Autologous    Prep Regimen Cytoxan  Melphalan   Primary BMT Provider Dr. Lucio     INTERVAL  HISTORY   Guille returns for clinic follow up with his son.  He notes the following: New shortness of breath this weekend. No sputum production, no hemoptysis. No chest pain. Persistent fevers to 104 at home this weekend, none in the last 24 hours however. New nausea this weekend with vomiting x1 Saturday. Took zofran the rest of the weekend, did not take today, no vomiting today. Belly cramping started Saturday, new onset watery diarrhea started today 12/9. He and his son note slowed reaction time with occasionally Guille \"spacing out\". He notes regular urination and denies any lower belly pain, lower back pain or difficulty starting a stream. No blood in urine. Rash on torso from weeks before has returned, started steroid cream this morning. Also on back.     Review of Systems:  as noted above    PHYSICAL EXAM     Blood pressure 100/66, pulse 107, temperature 98.2  F (36.8  C), temperature source Oral, resp. rate 16, weight 78.3 kg (172 lb 11.2 oz), SpO2 96 %.    KPS:  70  General: NAD   ENT: sclera anicteric  Lungs: CTAB  Cardiovascular: RRR, no M/R/G   Abdominal/Rectal: +BS, soft, NT, ND  Lymphatics: no edema  Skin: faint scattered maculopapular like rash on torso 12/6; more erythematous 12/9 non blanching  Neuro: A&O   Access: right arm peripheral IV    Labs:  Lab Results   Component Value Date    WBC 3.5 (L) 12/09/2019    ANEU 3.0 12/09/2019    HGB 7.6 (L) 12/09/2019    HCT 22.1 (L) 12/09/2019    PLT 4 (LL) 12/09/2019     (L) 12/09/2019    POTASSIUM 4.0 12/09/2019    CHLORIDE 94 12/09/2019    CO2 22 12/09/2019     (H) 12/09/2019    BUN 42 (H) 12/09/2019    CR 2.26 (H) 12/09/2019    MAG 2.1 11/26/2019    INR 1.17 (H) 11/15/2019    BILITOTAL 1.7 (H) " 12/09/2019    AST <3 12/09/2019    ALT 18 12/09/2019    ALKPHOS 224 (H) 12/09/2019    PROTTOTAL 6.2 (L) 12/09/2019    ALBUMIN 2.5 (L) 12/09/2019       ASSESSMENT/PLAN   Angel Yanez is a 58 yo man D+206 s/p 2nd autologous transplant for MM.   S/p 2 readmissions for FUO (10/16-10/23; 9/23-9/29/19).      1.  BMT/MM:   Slow engraftment; cell dose was only 0.639x10^6. ([facility] [place] - known poor cell dose as failed chemo-mobilization 1/2019.)   - [facility] CR.  - day +180 bone marrow biopsy 11/6 which showed no morphologic or immunophenotypic evidence of plasma cell neoplasm. His light chains were not elevated and he had no monoclonal protein in the serum.  Thus he is in clinical remission.  - PET in 8/2019 clear.   - PB FLOW 11/23: No overt aberrant immunophenotype on T cells.  Rare to absent mature B cells and plasma cells.      2.  HEME: Keep Hgb>8g/dL, plt >20 (epistaxis requiring rhinorocket packing in last admission).   - plts down to 4k, no bleeding per pt. Given plt and RBCs  - anemic, thrombocytopenic post BMT, low cell dose, +/- infection.   - intermittent neutropenia: give prn ANC </=1000  - No evidence of HLH on lymphnode bx 10/18/19.     - Persistent thrombocytopenia: BM 11/6/19 showed adequate granulopoiesis and erythropoiesis but his platelet precursors were 0 to absent which is reflected in peripheral CBC. Started promactic 50mcg daily on 11/10. Has been titrated up to 150mg daily (started 11/23); no change. Pt will complete Rx this week then stop.  - Hemolysis eval neg     3.  ID: Fevers to 104 the last 3 days on celebrex 2x/day, tylenol 1000mg 2x/day.   STOP celebrex 12/9  Hx FUO  A.) Extensive ID work up unremarkable x 2 sept and oct. 10/16 and 11/9 repeat chest CT: persistent mediastinal adenopathy no paranchymal disease. S/P bx of L axillary node on 10/18: No evidence of HLH, tissues looks reactive.   - s/p excisional R axillary lymph node bx 11/15/19 (neg core bx 10/18 L axillary). Bx sent to  [facility] 11/26 - no suspicious findings as of 12/6.  - HTLV neg with very low lymphocyte count. CD4 count 146.  - Per ID with negative w/u and not neutropenic discontinued cefepime 11/12. 11/15 CT Chest new GGO. Resumed cefepime cx's pending. RVP neg. Pulm consulted. No indication for BAL or further treatment/testing. Completed a zpak 11/21.   - Per ID, possible histoplasmosis: empiric itraconazole 200 TID started last admission with fevers initially resolved but now recurred; currently on bid dosing. 10/18 Histo/blasto urine antigen negative. Per Dr Kelley flucaonzole can suppress growth? 10/18 Kairus DNA test- negative. 1/12 itra level=1.6.    Right LNTissue culture (11/15): positive for stenotrophomonas malophilia and Ochrobactrum anthropi from broth only. Sensitive to bactrim and levaquin. Given culture + in broth only, this is likely contaminant but given FUO will treat empirically.   - Levaquin 750mg daily 11/26, stopped 12/6 as no impact to fever trends. Avoided bactrim given ongoing pancytopenia.   - As per ID's note 12/3 and after discussion with Dr Lucio, STOP itraconazole as no impact to fever trend (could also be potentiation FARZANA offending medications)  B.) Autoimmune workup so far negative. Rheum consult, last note on 11/14: felt no evidence of autoimmune disease flair neg studies above but recommended evaluate possible granulomatous finding with lymph nose biopsy to r/o sarcoidosis in the setting of FUO and elevated soluble IL-2 RA--  C.) Malignancy W/U: Neg BM BX 11/6. Neg PET in 8/2019. Neg core lymph node needle bx 10/18. PSA neg. Admit CT with R kidney hypoenhancing ill-defined focus. MRI 11/13 not suggestive of urothelial or renal cell carcinoma.   R axillary LN path still pending as of 12/3 - sent to [facility] (Dr. Dubon) for review as complicated/inconclusive here.   Prophy: (HOLD ACV with FARZANA) pentamidine (11/19)  - influenza vaccination given 11/26/19         4. Pulm: new SOB, exam with crackles  throughout. Will need CCT on admit (NO contrast)  - tachypnea/SOB: mainly with fevers. On RA. Imaging w/ contrast neg. Crackles and fluid up have been giving PRN lasix. CT Chest 11/15 new GGO and small effusions. Pulm consult as above. RVP neg. Cx pending. Completed Azithromycin 11/21   - 10/17: Echo with preserved EF, no evidence of right heart strain. Repeat echo 11/14 stable.  - 10/17 VQ scan neg for PE    5. GI: Nausea with vomiting this weekend. 12/9: New diarrhea, belly cramping x~2 days  C diff/ enteric ordered  - LFTs wnl 12/1    6.  FEN/Renal: New FARZANA to 2.26, Hyponatremia to 127  Mental status somewhat slowed from baseline, no edith mental status changes or CN focal deficits     7.  Mood: Continue Paxil.    8. Derm:   - recent rash on torso, now resolved. Derm consuledt. 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   Reemerged 12/9, resume triamcinolone TID on belly, back    Plan: given 2 units RBCs, 1 unit plt.  Admit to 5C for:  Primarily FARZANA; also for failure to thrive, slowed mental status, n/v/d, rash    Mony Pitts PAC  [phone]

## 2019-12-09 NOTE — PROGRESS NOTES
Infusion Nursing Note:  Angel Yanez presents today for PRBC, Plts.    Patient seen by provider today: Yes: SHANNON Delgado   present during visit today: Not Applicable.    Note: Patient was given 1 unit platelets for a count of 4K.  He was given 2 units PRBC for a Hgb of 7.6.  No pre meds given/needed.    Patient presented to infusion room feeling dyspnic.  His sats never dropped below 90% while ambulating.  Resting in chair sats 95-98% on RA.  For comfort the patient was briefly using 4L O2, however for the majority of the afternoon the patient was on RA.     Patient being admitted to hospital today for new renal insufficiency and dyspnea.  Renal ultrasound done.  Stool culture collected. See SBAR for details.      Intravenous Access:  Peripheral IV placed.    Treatment Conditions:  Lab Results   Component Value Date    HGB 7.6 12/09/2019     Lab Results   Component Value Date    WBC 3.5 12/09/2019      Lab Results   Component Value Date    ANEU 3.0 12/09/2019     Lab Results   Component Value Date    PLT 4 12/09/2019      Lab Results   Component Value Date     12/09/2019                   Lab Results   Component Value Date    POTASSIUM 4.0 12/09/2019           Lab Results   Component Value Date    MAG 2.1 11/26/2019            Lab Results   Component Value Date    CR 2.26 12/09/2019                   Lab Results   Component Value Date    ZHEN 9.1 12/09/2019                Lab Results   Component Value Date    BILITOTAL 1.7 12/09/2019           Lab Results   Component Value Date    ALBUMIN 2.5 12/09/2019                    Lab Results   Component Value Date    ALT 18 12/09/2019           Lab Results   Component Value Date    AST <3 12/09/2019           Post Infusion Assessment:  Patient tolerated infusion without incident.  Blood return noted pre and post infusion.  Site patent and intact, free from redness, edema or discomfort.  No evidence of extravasations.       Discharge Plan:   Admit to  5C.    CARLO CASTRO RN

## 2019-12-09 NOTE — NURSING NOTE
"Oncology Rooming Note    December 9, 2019 9:32 AM   Angel Yanez is a 59 year old male who presents for:    Chief Complaint   Patient presents with     Blood Draw     Labs drawn via PIV placed by RN in lab. VS taken. Pt checked in for next apt     RECHECK     Pt is here for a rtn for MM S/P BMT     Initial Vitals: Blood Pressure 100/66 (BP Location: Right arm, Patient Position: Sitting, Cuff Size: Adult Regular)   Pulse 107   Temperature 98.2  F (36.8  C) (Oral)   Respiration 16   Weight 78.3 kg (172 lb 11.2 oz)   Oxygen Saturation 96%   Body Mass Index 24.22 kg/m   Estimated body mass index is 24.22 kg/m  as calculated from the following:    Height as of 10/29/19: 1.798 m (5' 10.8\").    Weight as of this encounter: 78.3 kg (172 lb 11.2 oz). Body surface area is 1.98 meters squared.  No Pain (0) Comment: Data Unavailable   No LMP for male patient.  Allergies reviewed: Yes  Medications reviewed: Yes    Medications: Medication refills not needed today.  Pharmacy name entered into EPIC:    [facility] [place] [facility] [address]  [facility] - [place], [place] - [address]    Clinical concerns: Pt is having some SOB he said it started about 3 days ago. His rash is back and he used his cream.    His temp. This weekend got up to 101     Shoshana Salazar MA            "

## 2019-12-09 NOTE — H&P
BMT History & Physical     Admission  Name: Angel Yanez  Date:  12/9/2019  Service: BMT   Chief Complaint:  Shortness of breath  Informant:  Patient and Chart  Resuscitation Status: Full Code    Patient ID:  Angel Yanez is a 59 year old male, currently day +206 s/p auto PBSCT.     HPI: Patient presented to clinic today with a chief complaint of shortness of breath. Has been notably worse over the last few days. Of note his weekend was demarcated by new onset nausea with vomiting x1 12/7, belly cramping since and new diarrhea x1 today. He denies any issues with urination, no blood in urine, no difficulty starting a stream. Fevers were up to 104 this weekend which is slightly higher than usual but these fevers have been worked up thoroughly without yield to date. He denies any change to his chronic cough, no sputum production or hemoptysis. He endorses some mild dizziness and slowness to respond with speech and decreased attentiveness. The rash on his belly, previous diagnosed as [place] appears to have returned on his belly and back.   In clinic he received 1 unit RBCs without significant improvement to his SOB. A IDANIA was done with results below. He used 2L Oxygen for comfort but has no desats despite walking O2 monitoring.       Family History:   Family History   Problem Relation Age of Onset     Diabetes Mother      Cerebrovascular Disease Mother      Leukemia Father        Social History:   Social History     Socioeconomic History     Marital status:      Spouse name: Not on file     Number of children: Not on file     Years of education: Not on file     Highest education level: Not on file   Occupational History     Not on file   Social Needs     Financial resource strain: Not on file     Food insecurity:     Worry: Not on file     Inability: Not on file     Transportation needs:     Medical: Not on file     Non-medical: Not on file   Tobacco Use     Smoking status: Former Smoker     Smokeless  tobacco: Never Used   Substance and Sexual Activity     Alcohol use: Yes     Comment: occasionaly     Drug use: No     Sexual activity: Not on file   Lifestyle     Physical activity:     Days per week: Not on file     Minutes per session: Not on file     Stress: Not on file   Relationships     Social connections:     Talks on phone: Not on file     Gets together: Not on file     Attends [employer]al service: Not on file     Active member of club or organization: Not on file     Attends meetings of clubs or organizations: Not on file     Relationship status: Not on file     Intimate partner violence:     Fear of current or ex partner: Not on file     Emotionally abused: Not on file     Physically abused: Not on file     Forced sexual activity: Not on file   Other Topics Concern     Parent/sibling w/ CABG, MI or angioplasty before 65F 55M? 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 He has not worked in the field since ~ 2005. He lives in [place] with his wife & dog. His wife does the yardwork. There are outdoor birds on the property. 2 of their children live in [place]. No prior TB exposures.        Past Medical History:   Past Medical History:   Diagnosis Date     GERD (gastroesophageal reflux disease)      H/O autologous stem cell transplant (H) 02/2005     Hyperlipidemia      Multiple myeloma (H) 2004     PONV (postoperative nausea and vomiting)      Psoriasis      Psoriatic arthritis (H)         Past Surgical History:   Past Surgical History:   Procedure Laterality Date     ARTHROPLASTY HIP       BIOPSY LYMPH NODE AXILLA N/A 11/15/2019    Procedure: Right axillary lymph node biopsy;  Surgeon: Reese Irving MD;  Location: [facility] OR     COLONOSCOPY       HERNIA REPAIR       IR CVC TUNNEL PLACEMENT > 5 YRS OF AGE  1/22/2019     IR CVC TUNNEL PLACEMENT > 5 YRS OF AGE  5/16/2019     IR CVC TUNNEL REMOVAL LEFT  2/20/2019     IR CVC TUNNEL REMOVAL RIGHT   7/23/2019     IR FOLLOW UP VISIT OUTPATIENT  1/24/2019     IR LYMPH NODE BIOPSY  10/18/2019     ORTHOPEDIC SURGERY       PROCURE BONE MARROW N/A 5/14/2019    Procedure: Bone Marrow [place];  Surgeon: Antwan Erickson MD;  Location: [facility] OR     TRANSPLANT         Allergies:   Allergies   Allergen Reactions     Avalide Hives     Chlorhexidine Itching     Chloroxylenol Rash     Technicare solution     Lorazepam Hives     Moxifloxacin Hives       Home Medications      Prior to Admission medications    Medication Sig Start Date End Date Taking? Authorizing Provider   acyclovir (ZOVIRAX) 800 MG tablet Take 1 tablet (800 mg) by mouth 2 times daily HOLD 12/9 w FARZANA 12/9/19   Mony Pitts PA-C   calcium carbonate (CALCIUM CARBONATE) 600 MG tablet Take 2 tablets by mouth daily     Reported, Patient   celecoxib (CELEBREX) 100 MG capsule Take 1 capsule (100 mg) by mouth 2 times daily HOLD 12/9 for rising Cr 12/9/19   oMny Pitts PA-C   Cholecalciferol (VITAMIN D3) 2000 units CAPS Take 2,000 Units by mouth daily     Reported, Patient   eltrombopag (PROMACTA) 50 MG tablet Take 150 mg by mouth daily Administer on an empty stomach, 1 hour before or 2 hours after a meal.    Reported, Patient   PARoxetine (PAXIL) 20 MG tablet Take 1 tablet (20 mg) by mouth every morning 12/2/19   Adebayo Lucio MD   pentamidine (NEBUPENT) 300 MG neb solution Inhale 300 mg into the lungs every 28 days Will get in BMT clinic. Next due Monday 11/19/19 11/17/19   Cassie Ferrari PA-C   sulfamethoxazole-trimethoprim (BACTRIM DS/SEPTRA DS) 800-160 MG tablet Take 1 tablet by mouth 2 times daily  Patient not taking: Reported on 12/1/2019 11/26/19   Fei Meng PA-C   triamcinolone (KENALOG) 0.1 % external cream Apply topically 2 times daily  Patient not taking: Reported on 12/3/2019 11/17/19   Cassie Ferrari PA-C       Review of Systems    Review of Systems:  CONSTITUTIONAL: POSITIVE for fever, NEGATIVE for chills,  change in weight  INTEGUMENTARY/SKIN: POSITIVE for new abdom/back rash.   EYES: NEGATIVE for vision changes or irritation  ENT/MOUTH: NEGATIVE for ear, mouth and throat problems  RESP: POSITIVE for significant SOB, stable, chronic dry cough  BREAST: NEGATIVE for masses, tenderness or discharge  CV: NEGATIVE for chest pain, palpitations or peripheral edema  GI: POSITIVE  For new onset n/v/d. NEGATIVE for nausea, abdominal pain, heartburn  : NEGATIVE for frequency, dysuria, or hematuria  MUSCULOSKELETAL: NEGATIVE for significant arthralgias or myalgia  NEURO: POSITIVE for slowness in response per pt/family. NEGATIVE for weakness, dizziness or paresthesias  ENDOCRINE: NEGATIVE for temperature intolerance, skin/hair changes  HEME/ALLERGY: NEGATIVE for bleeding problems  PSYCHIATRIC: NEGATIVE for changes in mood or affect    PHYSICAL EXAM      Weight     Wt Readings from Last 3 Encounters:   12/09/19 78.3 kg (172 lb 11.2 oz)   12/06/19 76.8 kg (169 lb 4.8 oz)   12/03/19 76.4 kg (168 lb 8 oz)        KPS: 70    VS in clinic, BP down to 82/48, improved after ptl and 1 unit RBCs to 110s/80s.   Not tachycardia, no tachypnia noted  Afebrile in clinic     General: NAD   ENT: sclera anicteric  Lungs: CTAB  Cardiovascular: RRR, no M/R/G   Abdominal/Rectal: +BS, soft, NT, ND  Lymphatics: no edema  Skin: faint scattered maculopapular like rash on torso 12/6; more erythematous 12/9 non blanching  Neuro: A&O   Access: right arm peripheral IV      ASSESSMENT BY SYSTEMS   Angel WHEELER Renata is a 60 yo man D+206 s/p 2nd autologous transplant for MM.   S/p 2 readmissions for FUO (10/16-10/23; 9/23-9/29/19).      1.  BMT/MM:   Slow engraftment; cell dose was only 0.639x10^6. ([place] [place] - known poor cell dose as failed chemo-mobilization 1/2019.)   - Stringent CR.  - day +180 bone marrow biopsy 11/6 which showed no morphologic or immunophenotypic evidence of plasma cell neoplasm. His light chains were not elevated and he had no  monoclonal protein in the serum.  Thus he is in clinical remission.  - PET in 8/2019 clear.   - PB FLOW 11/23: No overt aberrant immunophenotype on T cells.  Rare to absent mature B cells and plasma cells.      2.  HEME: Keep Hgb>8g/dL, plt >20 (epistaxis requiring rhinorocket packing in last admission). Plt and RBCs x2 units given 12/9  - plts down to 4k, no bleeding per pt.   - anemic, thrombocytopenic post BMT, low cell dose, +/- infection.   - intermittent neutropenia: give prn ANC </=1000  - No evidence of HLH on lymphnode bx 10/18/19.     - Persistent thrombocytopenia: BM 11/6/19 showed adequate granulopoiesis and erythropoiesis but his platelet precursors were 0 to absent which is reflected in peripheral CBC. Started promactic 50mcg daily on 11/10. Has been titrated up to 150mg daily (started 11/23); no change. Pt will complete Rx this week then stop (stopped 12/6)  - Hemolysis eval neg     3.  ID: Fevers to 104 the last 3 days on celebrex 2x/day, tylenol 1000mg 2x/day.   STOP celebrex 12/9. Scheduled 1,000mg tylenol BID on admit  With SOB, obtained RVP.   Hx FUO  A.) Extensive ID work up unremarkable x 2 sept and oct. 10/16 and 11/9 repeat chest CT: persistent mediastinal adenopathy no paranchymal disease. S/P bx of L axillary node on 10/18: No evidence of HLH, tissues looks reactive.   - s/p excisional R axillary lymph node bx 11/15/19 (neg core bx 10/18 L axillary). [facility]x [facility] 11/26 - no suspicious findings as of 12/6.  - HTLV neg with very low lymphocyte count. CD4 count 146.  - Per ID with negative w/u and not neutropenic discontinued cefepime 11/12. 11/15 CT Chest new GGO. Resumed cefepime cx's pending. RVP neg. Pulm consulted. No indication for BAL or further treatment/testing. Completed a zpak 11/21.   - Per ID, possible histoplasmosis: empiric itraconazole 200 TID started last admission with fevers initially resolved but now recurred; currently on bid dosing. 10/18 Histo/blasto urine antigen  negative. Per Dr Kelley flucaonzole can suppress growth? 10/18 [place] DNA test- negative. 1/12 itra level=1.6.    Right LNTissue culture (11/15): positive for stenotrophomonas malophilia and Ochrobactrum anthropi from broth only. Sensitive to bactrim and levaquin. Given culture + in broth only, this is likely contaminant but given FUO will treat empirically.   - Levaquin 750mg daily 11/26, stopped 12/6 as no impact to fever trends.   - As per ID's note 12/3 and after discussion with Dr Lucio, STOP itraconazole as no impact to fever trend (could also be potentiation FARZANA offending medications)  B.) Autoimmune workup so far negative. Rheum consult, last note on 11/14: felt no evidence of autoimmune disease flair neg studies above but recommended evaluate possible granulomatous finding with lymph nose biopsy to r/o sarcoidosis in the setting of FUO and elevated soluble IL-2 RA--  C.) Malignancy W/U: Neg BM BX 11/6. Neg PET in 8/2019. Neg core lymph node needle bx 10/18. PSA neg. Admit CT with R kidney hypoenhancing ill-defined focus. MRI 11/13 not suggestive of urothelial or renal cell carcinoma.   R axillary LN path still pending as of 12/3 - sent to [facility] (Dr. Dubon) for review as complicated/inconclusive here.     Prophy: (HOLD ACV with FARZANA), on pentamidine (11/19) with pancytopenias   - influenza vaccination given 11/26/19          4. Pulm: new SOB, exam with crackles throughout. Will need CCT on admit (NO contrast)  - tachypnea/SOB: mainly with fevers. On RA. Imaging w/ contrast neg. Crackles and fluid up have been giving PRN lasix. CT Chest 11/15 new GGO and small effusions. Pulm consult as above. RVP neg. Cx pending. Completed Azithromycin 11/21   - 10/17: Echo with preserved EF, no evidence of right heart strain. Repeat echo 11/14 stable.  - 10/17 VQ scan neg for PE     5. GI: Nausea with vomiting this weekend. 12/9: New diarrhea, belly cramping x~2 days  C diff/ enteric ordered  - LFTs wnl 12/1     6.   FEN/Renal: New FARZANA to 2.26, Hyponatremia to 127. Start 50mL/hr fluids on admit  Mental status somewhat slowed from baseline, no edith mental status changes or CN focal deficits     7.  Mood: Continue Paxil.     8. Derm:   - recent rash on torso, now resolved. Derm consuledt. Skin bx back 11/15 Acantholytic dyskeratosis and subtle interface dermatitis consistent with chemotherapy-induced [place] disease.   Reemerged 12/9, resume triamcinolone TID on belly, back       Mony POTTER [place]   [phone]

## 2019-12-10 LAB
ALBUMIN UR-MCNC: 30 MG/DL
ANION GAP SERPL CALCULATED.3IONS-SCNC: 9 MMOL/L (ref 3–14)
ANISOCYTOSIS BLD QL SMEAR: ABNORMAL
APPEARANCE UR: ABNORMAL
BASOPHILS # BLD AUTO: 0 10E9/L (ref 0–0.2)
BASOPHILS NFR BLD AUTO: 0 %
BILIRUB DIRECT SERPL-MCNC: 0.8 MG/DL (ref 0–0.2)
BILIRUB UR QL STRIP: NEGATIVE
BLD PROD TYP BPU: NORMAL
BLD PROD TYP BPU: NORMAL
BLD UNIT ID BPU: 0
BLOOD PRODUCT CODE: NORMAL
BPU ID: NORMAL
BUN SERPL-MCNC: 50 MG/DL (ref 7–30)
BURR CELLS BLD QL SMEAR: SLIGHT
CALCIUM SERPL-MCNC: 7.9 MG/DL (ref 8.5–10.1)
CHLORIDE SERPL-SCNC: 99 MMOL/L (ref 94–109)
CO2 SERPL-SCNC: 23 MMOL/L (ref 20–32)
COLOR UR AUTO: YELLOW
COPATH REPORT: NORMAL
CREAT SERPL-MCNC: 1.78 MG/DL (ref 0.66–1.25)
CREAT SERPL-MCNC: 2.22 MG/DL (ref 0.66–1.25)
CREAT UR-MCNC: 145 MG/DL
DIFFERENTIAL METHOD BLD: ABNORMAL
EOSINOPHIL # BLD AUTO: 0.3 10E9/L (ref 0–0.7)
EOSINOPHIL NFR BLD AUTO: 14.7 %
ERYTHROCYTE [DISTWIDTH] IN BLOOD BY AUTOMATED COUNT: 21.7 % (ref 10–15)
FLUAV H1 2009 PAND RNA SPEC QL NAA+PROBE: NEGATIVE
FLUAV H1 RNA SPEC QL NAA+PROBE: NEGATIVE
FLUAV H3 RNA SPEC QL NAA+PROBE: NEGATIVE
FLUAV RNA SPEC QL NAA+PROBE: NEGATIVE
FLUBV RNA SPEC QL NAA+PROBE: NEGATIVE
FRACT EXCRET NA UR+SERPL-RTO: <0.1 %
GFR SERPL CREATININE-BSD FRML MDRD: 31 ML/MIN/{1.73_M2}
GFR SERPL CREATININE-BSD FRML MDRD: 40 ML/MIN/{1.73_M2}
GLUCOSE SERPL-MCNC: 108 MG/DL (ref 70–99)
GLUCOSE UR STRIP-MCNC: NEGATIVE MG/DL
HADV DNA SPEC QL NAA+PROBE: NEGATIVE
HADV DNA SPEC QL NAA+PROBE: NEGATIVE
HCT VFR BLD AUTO: 23.5 % (ref 40–53)
HGB BLD-MCNC: 8.2 G/DL (ref 13.3–17.7)
HGB UR QL STRIP: ABNORMAL
HMPV RNA SPEC QL NAA+PROBE: NEGATIVE
HPIV1 RNA SPEC QL NAA+PROBE: NEGATIVE
HPIV2 RNA SPEC QL NAA+PROBE: NEGATIVE
HPIV3 RNA SPEC QL NAA+PROBE: NEGATIVE
KETONES UR STRIP-MCNC: NEGATIVE MG/DL
LACTATE BLD-SCNC: 1.6 MMOL/L (ref 0.7–2)
LEUKOCYTE ESTERASE UR QL STRIP: NEGATIVE
LYMPHOCYTES # BLD AUTO: 0.1 10E9/L (ref 0.8–5.3)
LYMPHOCYTES NFR BLD AUTO: 6 %
MCH RBC QN AUTO: 31.9 PG (ref 26.5–33)
MCHC RBC AUTO-ENTMCNC: 34.9 G/DL (ref 31.5–36.5)
MCV RBC AUTO: 91 FL (ref 78–100)
METAMYELOCYTES # BLD: 0.1 10E9/L
METAMYELOCYTES NFR BLD MANUAL: 3.4 %
MICROBIOLOGIST REVIEW: NORMAL
MONOCYTES # BLD AUTO: 0.1 10E9/L (ref 0–1.3)
MONOCYTES NFR BLD AUTO: 2.6 %
MUCOUS THREADS #/AREA URNS LPF: PRESENT /LPF
NEUTROPHILS # BLD AUTO: 1.5 10E9/L (ref 1.6–8.3)
NEUTROPHILS NFR BLD AUTO: 73.3 %
NITRATE UR QL: NEGATIVE
NUM BPU REQUESTED: 1
PH UR STRIP: 5.5 PH (ref 5–7)
PLATELET # BLD AUTO: 11 10E9/L (ref 150–450)
POIKILOCYTOSIS BLD QL SMEAR: SLIGHT
POTASSIUM SERPL-SCNC: 3.7 MMOL/L (ref 3.4–5.3)
RBC # BLD AUTO: 2.57 10E12/L (ref 4.4–5.9)
RBC #/AREA URNS AUTO: <1 /HPF (ref 0–2)
RBC CASTS #/AREA URNS LPF: 10 /LPF
RHINOVIRUS RNA SPEC QL NAA+PROBE: NEGATIVE
RSV RNA SPEC QL NAA+PROBE: NEGATIVE
RSV RNA SPEC QL NAA+PROBE: NEGATIVE
SODIUM SERPL-SCNC: 131 MMOL/L (ref 133–144)
SODIUM SERPL-SCNC: 132 MMOL/L (ref 133–144)
SODIUM UR-SCNC: 9 MMOL/L
SOURCE: ABNORMAL
SP GR UR STRIP: 1.02 (ref 1–1.03)
SPECIMEN SOURCE: NORMAL
TRANS CELLS #/AREA URNS HPF: <1 /HPF (ref 0–1)
TRANSFUSION STATUS PATIENT QL: NORMAL
TRANSFUSION STATUS PATIENT QL: NORMAL
UROBILINOGEN UR STRIP-MCNC: 2 MG/DL (ref 0–2)
WBC # BLD AUTO: 2 10E9/L (ref 4–11)
WBC #/AREA URNS AUTO: 1 /HPF (ref 0–5)

## 2019-12-10 PROCEDURE — 86359 T CELLS TOTAL COUNT: CPT | Performed by: PHYSICIAN ASSISTANT

## 2019-12-10 PROCEDURE — 25000128 H RX IP 250 OP 636: Performed by: PHYSICIAN ASSISTANT

## 2019-12-10 PROCEDURE — 36415 COLL VENOUS BLD VENIPUNCTURE: CPT | Performed by: INTERNAL MEDICINE

## 2019-12-10 PROCEDURE — 84300 ASSAY OF URINE SODIUM: CPT | Performed by: PHYSICIAN ASSISTANT

## 2019-12-10 PROCEDURE — 80048 BASIC METABOLIC PNL TOTAL CA: CPT | Performed by: INTERNAL MEDICINE

## 2019-12-10 PROCEDURE — 25000132 ZZH RX MED GY IP 250 OP 250 PS 637: Performed by: STUDENT IN AN ORGANIZED HEALTH CARE EDUCATION/TRAINING PROGRAM

## 2019-12-10 PROCEDURE — 82164 ANGIOTENSIN I ENZYME TEST: CPT | Performed by: INTERNAL MEDICINE

## 2019-12-10 PROCEDURE — 25000132 ZZH RX MED GY IP 250 OP 250 PS 637: Performed by: INTERNAL MEDICINE

## 2019-12-10 PROCEDURE — 36415 COLL VENOUS BLD VENIPUNCTURE: CPT | Performed by: PHYSICIAN ASSISTANT

## 2019-12-10 PROCEDURE — 82565 ASSAY OF CREATININE: CPT | Performed by: INTERNAL MEDICINE

## 2019-12-10 PROCEDURE — 87103 BLOOD FUNGUS CULTURE: CPT | Performed by: INTERNAL MEDICINE

## 2019-12-10 PROCEDURE — 82248 BILIRUBIN DIRECT: CPT | Performed by: INTERNAL MEDICINE

## 2019-12-10 PROCEDURE — 83605 ASSAY OF LACTIC ACID: CPT | Performed by: INTERNAL MEDICINE

## 2019-12-10 PROCEDURE — 86360 T CELL ABSOLUTE COUNT/RATIO: CPT | Performed by: PHYSICIAN ASSISTANT

## 2019-12-10 PROCEDURE — 20600000 ZZH R&B BMT

## 2019-12-10 PROCEDURE — 82570 ASSAY OF URINE CREATININE: CPT | Performed by: PHYSICIAN ASSISTANT

## 2019-12-10 PROCEDURE — 25000132 ZZH RX MED GY IP 250 OP 250 PS 637: Performed by: PHYSICIAN ASSISTANT

## 2019-12-10 PROCEDURE — 82652 VIT D 1 25-DIHYDROXY: CPT | Performed by: PHYSICIAN ASSISTANT

## 2019-12-10 PROCEDURE — 85025 COMPLETE CBC W/AUTO DIFF WBC: CPT | Performed by: INTERNAL MEDICINE

## 2019-12-10 PROCEDURE — P9037 PLATE PHERES LEUKOREDU IRRAD: HCPCS | Performed by: PHYSICIAN ASSISTANT

## 2019-12-10 PROCEDURE — 84295 ASSAY OF SERUM SODIUM: CPT | Performed by: INTERNAL MEDICINE

## 2019-12-10 PROCEDURE — 86355 B CELLS TOTAL COUNT: CPT | Performed by: PHYSICIAN ASSISTANT

## 2019-12-10 PROCEDURE — 87040 BLOOD CULTURE FOR BACTERIA: CPT | Performed by: INTERNAL MEDICINE

## 2019-12-10 PROCEDURE — 87086 URINE CULTURE/COLONY COUNT: CPT | Performed by: PHYSICIAN ASSISTANT

## 2019-12-10 PROCEDURE — 25000125 ZZHC RX 250: Performed by: PHYSICIAN ASSISTANT

## 2019-12-10 PROCEDURE — 25800030 ZZH RX IP 258 OP 636: Performed by: PHYSICIAN ASSISTANT

## 2019-12-10 PROCEDURE — 86357 NK CELLS TOTAL COUNT: CPT | Performed by: PHYSICIAN ASSISTANT

## 2019-12-10 PROCEDURE — 81001 URINALYSIS AUTO W/SCOPE: CPT | Performed by: PHYSICIAN ASSISTANT

## 2019-12-10 PROCEDURE — 87449 NOS EACH ORGANISM AG IA: CPT | Performed by: PHYSICIAN ASSISTANT

## 2019-12-10 RX ORDER — VITAMIN B COMPLEX
2000 TABLET ORAL DAILY
Status: DISCONTINUED | OUTPATIENT
Start: 2019-12-10 | End: 2019-12-12 | Stop reason: HOSPADM

## 2019-12-10 RX ORDER — ITRACONAZOLE 100 MG/1
CAPSULE ORAL DAILY
Status: ON HOLD | COMMUNITY
End: 2019-12-12

## 2019-12-10 RX ORDER — LOPERAMIDE HCL 2 MG
2 CAPSULE ORAL 4 TIMES DAILY PRN
Status: DISCONTINUED | OUTPATIENT
Start: 2019-12-10 | End: 2019-12-12 | Stop reason: HOSPADM

## 2019-12-10 RX ADMIN — ACETAMINOPHEN 650 MG: 325 TABLET, FILM COATED ORAL at 15:57

## 2019-12-10 RX ADMIN — LOPERAMIDE HYDROCHLORIDE 2 MG: 2 CAPSULE ORAL at 16:58

## 2019-12-10 RX ADMIN — MELATONIN 2000 UNITS: at 15:21

## 2019-12-10 RX ADMIN — PHYTONADIONE 5 MG: 10 INJECTION, EMULSION INTRAMUSCULAR; INTRAVENOUS; SUBCUTANEOUS at 13:11

## 2019-12-10 RX ADMIN — ACETAMINOPHEN 975 MG: 325 TABLET, FILM COATED ORAL at 20:13

## 2019-12-10 RX ADMIN — SODIUM CHLORIDE: 9 INJECTION, SOLUTION INTRAVENOUS at 20:13

## 2019-12-10 RX ADMIN — ACETAMINOPHEN 650 MG: 325 TABLET, FILM COATED ORAL at 03:48

## 2019-12-10 RX ADMIN — TRIAMCINOLONE ACETONIDE: 1 CREAM TOPICAL at 20:13

## 2019-12-10 RX ADMIN — Medication 1200 MG: at 15:20

## 2019-12-10 RX ADMIN — ACETAMINOPHEN 975 MG: 325 TABLET, FILM COATED ORAL at 08:21

## 2019-12-10 RX ADMIN — OMEPRAZOLE 40 MG: 20 CAPSULE, DELAYED RELEASE ORAL at 15:57

## 2019-12-10 RX ADMIN — TRIAMCINOLONE ACETONIDE: 1 CREAM TOPICAL at 15:21

## 2019-12-10 RX ADMIN — PAROXETINE HYDROCHLORIDE HEMIHYDRATE 20 MG: 20 TABLET, FILM COATED ORAL at 08:22

## 2019-12-10 RX ADMIN — TRIAMCINOLONE ACETONIDE: 1 CREAM TOPICAL at 08:23

## 2019-12-10 RX ADMIN — OMEPRAZOLE 40 MG: 20 CAPSULE, DELAYED RELEASE ORAL at 08:21

## 2019-12-10 ASSESSMENT — ACTIVITIES OF DAILY LIVING (ADL)
ADLS_ACUITY_SCORE: 11

## 2019-12-10 NOTE — PLAN OF CARE
Tmax 103.3. Blood culture done and tylenol given. Cold packs applied. No antibiotic for now as per Moonlighter last night. Saturating 89-90% at room air, started on O2 at 2L, saturating 93-94%. MD notiifed regarding need for O2. Slightly tachypneic and tachycardic with fever. BP elevated with fever. Denies SOB, vomiting, bleeding or chest pain. Sepsis alert triggered. Lactic acid 2.4. NS 500ml given. Repeat lactate=1.6. Zofran given x 1 for nausea. With minimal urine output. Loose/watery stool x1. C. Diff negative. Up independently and voiding freely. NS x 50ml/hr infusing.   Problem: Adult Inpatient Plan of Care  Goal: Plan of Care Review  12/10/2019 0535 by Princess Emily Hoyt RN  Outcome: No Change     Problem: Adult Inpatient Plan of Care  Goal: Patient-Specific Goal (Individualization)  12/10/2019 0535 by Princess Emily Hoyt RN  Outcome: No Change     Problem: Adult Inpatient Plan of Care  Goal: Absence of Hospital-Acquired Illness or Injury  12/10/2019 0535 by Princess Emily Hoyt RN  Outcome: No Change     Problem: Adult Inpatient Plan of Care  Goal: Optimal Comfort and Wellbeing  12/10/2019 0535 by Princess Emily Hoyt RN  Outcome: No Change     Problem: Adult Inpatient Plan of Care  Goal: Readiness for Transition of Care  12/10/2019 0535 by Princess Emily Hoyt RN  Outcome: No Change     Problem: Adult Inpatient Plan of Care  Goal: Rounds/Family Conference  12/10/2019 0535 by Princess Emily Hoyt RN  Outcome: No Change     Problem: Electrolyte Imbalance (Acute Kidney Injury/Impairment)  Goal: Serum Electrolyte Balance  12/10/2019 0535 by Princess Emily Hoyt RN  Outcome: No Change     Problem: Hematologic Alteration (Acute Kidney Injury/Impairment)  Goal: Hemoglobin, Hematocrit and Platelets Within Normal Range  12/10/2019 0535 by Princess Emily Hoyt RN  Outcome: No Change     Problem: Renal Function Impairment (Acute Kidney Injury/Impairment)  Goal: Effective Renal Function  12/10/2019  0535 by Princess Emily Hoyt, RN  Outcome: No Change     Problem: Infection  Goal: Infection Symptom Resolution  Outcome: No Change     Problem: Diarrhea  Goal: Fluid and Electrolyte Balance  Outcome: No Change     Problem: Bleeding Risk or Actual  Goal: Absence of Bleeding  Outcome: No Change

## 2019-12-10 NOTE — PROGRESS NOTES
Psychosocial Assessment completed in the BMT Clinic and copied here for staff review.    Blood and Marrow Transplant   Psychosocial Assessment with   Clinical      REPEAT Assessment completed on 5/6/2019 of living situation, support system, financial status, functional status, coping, stressors, need for resources and social work intervention provided as needed. Information for this assessment was provided by pt's report in addition to medical chart review and consultation with medical team.      Present at assessment:   Patient: Angel Yanez  : ZUHAIR Woodard, JANELL     Diagnosis: Multiple Myeloma     Transplant type: Autologous     Donor: Autologous      Physician: Adebaoy Lucio MD     Nurse Coordinator: Su Pate RN     Permanent Address:   [address] [place] [id number]     Contact Information:  Pt's Home Phone: [phone]  Pt's Cell Phone: [phone]  Pt Email: [email or web address]  Wife-Jennifer Yanez's Phone: [phone]  Son-Dalton Yanez's Phone: [phone]     Presenting Information:  Zaheer is a 58 year old male diagnosed with multiple myeloma who presents for evaluation for autologous transplant at the [facility] ([facility] This will be pt's 2nd autologous transplant at the [facility].     Decision Making: Self     Health Care Directive: Yes. Pt has completed the healthcare directive completed and on file.      Relationship Status: . Pt described relationship as stable and supportive.      Special Needs: None identified at this time.      Family/Support System: Pt endorsed a good support system including family and close friends who will be available to support pt throughout transplant process.      Spouse: Jennifer Yanez  Children: 3 Children; Son-Alivia Yanez (28-Lives in [place], [place]), Son-Magdiel Yanez (Lives in [place], [place]) and Daughter-Jerry Yanez (Lives in [place])  Grandchildren: 1 Grandson-Alivia Tyler (1.5 years  "old)  Parents:   Siblings: 1 Sister-Sofie Calvin (Lives in [place], [place])     Caregiver: JANELL discussed with pt the caregiver role and expectation at length. During last PSA on 1/15/2019, pt said he won t have a caregiver 24/7 but will tell me he does if that is required and pt said SW can put his sonLucille down as well  if Social Work needed another name on the caregiver contract . During the PSA today (2019), pt said \"oh yea I will have a caregiver\". Pt's wife runs a small  out of the home, so she is there during the day. SW reminded pt of the last conversation and SW repeatedly reminded pt he is required to have a caregiver 24/7 for 30 days. Pt signed the caregiver contract which will be scanned into the EMR. Pt said his wife-Jennifer and son-Dalton will be the caregivers. Caregiver education and resources provided. EULALIO Delgadillo, and Andrew are aware. Dr. Lucio said he would reinforce the caregiver requirement during the close appointment.       Caregiver Contact Information:  Wife-Jennifer Yanez's Phone: [phone]  SonKiera Yanez's Phone: [phone]     Transportation Mode: Private Vehicle. During last PSA on 1/15/2019, pt said he will be driving himself to clinic post-transplant. JANELL thoroughly explained to pt that he cannot drive post-transplant until approved by a Physician. EULALIO Delgadillo, and Andrew were notified. Dr. Lucio said he would reinforce the \"no driving\" during the close appointment as well.      Insurance: Pt has [employer] health insurance; pt has an . Pt denied specific insurance concerns at this time. JANELL reiterated information about the [employer]  should specific insurance questions arise as pt moves through transplant process. Future Insurance questions referred to [employer] -Jessica Downs (P: [phone]).      Sources of Income: Pt's income, pt went back to work, and his " "wife's income. Once pt completely stops working, pt will re-file for short term disability. Pt also has long term disability available. Pt denied anticipation of financial hardship related to BMT at this time. SW provided information on gay options and encouraged pt to contact this SW for support should financial situation change.      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 Pt's wife-Jennifer owns her own  at home.      Mental Health: Pt denied a history of mental health concerns, specific diagnoses or medications at this time. SW explained that it's not uncommon for patients going through transplant to experience symptoms of depression/anxiety.      PHQ-9:  Pt scored a 4 which indicates no sign of depression on the depression severity scale. Pt does not endorse feelings  of depression at this time.      GAD7:  Pt scored a 3 which indicates no sign of anxiety on the Generalized Anxiety Disorder Questionnaire. Pt endorses this is an accurate reflection of his emotional state.     Chemical Use: Pt reported that he quit smoking 35 years ago. Pt reported minimal alcohol consumption; 2 beers in the past 3 months. Pt reported no hx of marijuana or other drugs. Based on the information provided, there appear to be no specific risks or concerns identified at this time.      Trauma/Loss/Abuse History: Multiple losses associated with cancer diagnosis and treatment, including health, employment, changes to physical appearance, etc.      Spirituality: Pt would not like a blessing ceremony while inpatient. SW explained that there are Chaplains on the unit and pt can request to meet with a  at anytime.     Coping: Pt noted that he is currently feeling \"frustrated and ready to begin\". Pt shared that his main coping mechanisms are exercise (riding bike). SW and pt discussed additional positive coping mechanisms that pt can utilize while in the hospital. While hospitalized, pt plans to walk the " hallways and watch tv.      Education Provided: Transplant process expectations, Caregiver requirements, Caregiver self-care, Financial issues related to transplant, Financial resources/grants available, Common psychosocial stressors pre/post transplant, Support group(s) available, Hospital resources available, Social Work role and Resources for grandchild.     Interventions Provided: Psychosocial Support and Education      Assessment and Recommendations for Team:  Pt is a 58 year old male diagnosed with multiple myeloma who is here undergoing preparation for a planned autologous transplant. Pt appeared to be in good spirits during conversation. Pt is pleasant and able to articulate concerns/coping mechanisms in an appropriate manner. Pt feels comfortable communicating with the medical team. Pt has a good supportive network of family and friends who are involved. Pt has developed a few coping mechanisms such as exercising and working on his hobbies. Pt may benefit from assistance in developing coping mechanisms. Pt and pt's family will benefit from ongoing psychosocial support in regards to coping with the adjustment to the BMT process.      Pt has a good support system. Please see caregiver section for update. Pt verbalizes understanding of the transplant process and wanting to proceed. SW provided contact information and encouraged pt to contact SW with questions, concerns, resources and for support.     Per this assessment, SW did not identify any barriers to this patient moving forward with transplant.     Important Information:   -See caregiver section. Check in with pt while inpatient to re-confirm plan.  -Pt said he plans to drive to clinic appointments post-transplant. Dr. Lucio notified and Dr. Lucio was going to tell pt he cannot drive.     Follow up Planned:   Psychosocial support     Christie MOFFETT, [facility]  Phone: [phone]  Pager: [phone]

## 2019-12-10 NOTE — PROGRESS NOTES
BMT Daily Progress Note   12/10/2019    Patient ID:  Angel Yanez is a 58 yo man D+207 s/p 2nd autologous transplant for MM.   S/p 2 readmissions for FUO (10/16-10/23; 9/23-9/29/19).        Diagnosis MM Multiple myeloma  HCT Type Autologous    Prep Regimen Cytoxan  Melphalan   Donor Source No data was found    GVHD Prophylaxis No  Primary BMT Provider Naveed Ortiz was admitted on 12/9/2019 for FARZANA and increased sob with chronic cough.    INTERVAL  HISTORY    Presented with higher fevers, increased sob with exertion and nonproductive cough. He has borderline sats, decrease with coughing jag.  Sats did not decrease with walking in clinic yesterday. Having some nausea with new diarrhea.Denies abdominal pain.  New FARZANA and bump in LFT.    Review of Systems: 10 point ROS negative except as noted above.      PHYSICAL EXAM     Weight In/Out     Wt Readings from Last 3 Encounters:   12/10/19 79.2 kg (174 lb 9.7 oz)   12/09/19 78.3 kg (172 lb 11.2 oz)   12/06/19 76.8 kg (169 lb 4.8 oz)      I/O last 3 completed shifts:  In: 1553.33 [P.O.:480; I.V.:573.33; IV Piggyback:500]  Out: 295 [Urine:295]         /76 (BP Location: Left arm)   Pulse 88   Temp 100.4  F (38  C) (Axillary)   Resp 20   Wt 79.2 kg (174 lb 9.7 oz)   SpO2 95%   BMI 24.49 kg/m         General: NAD   Eyes: : FARRAH, sclera anicteric   Nose/Mouth/Throat: OP clear, buccal mucosa moist, no ulcerations   Lungs:  Some crackles on left base, right decreased bs but no crackles  Cardiovascular: RRR, no M/R/G   Abdominal/Rectal: +BS, soft, NT, ND, No HSM   Lymphatics: no edema  Skin: no  [place], Non-confluent macular pink rash on chest and back  Neuro: A&O   Additional Findings: Peripheral IV      LABS AND IMAGING - PAST 24 HOURS     Results for orders placed or performed during the hospital encounter of 12/09/19 (from the past 24 hour(s))   CT Chest w/o Contrast    Narrative    EXAM:  CT CHEST W/O CONTRAST . 12/9/2019 5:57 PM     TECHNIQUE:   Helical CT images from the thoracic inlet through the  upper abdomen were obtained without intravenous contrast. Contrast  dose: None    COMPARISON: 11/15/2019    HISTORY:   Chest pain or SOB, pleurisy or effusion suspected     FINDINGS:  LUNGS: The central tracheobronchial tree is patent. Trace bilateral  pleural effusions, decreased in size from the prior exam. Smooth  interlobular septal thickening, most apparent in the lung bases.  Previously seen scattered areas of groundglass attenuation in the  upper lobes have resolved. Persistent areas of micronodularity,  greatest in the right middle lobe and lower lobes.    MEDIASTINUM: Thyroid gland is unremarkable. Heart is normal in size.  No pericardial effusion. Ascending aorta and main pulmonary artery are  normal in caliber. Coronary artery calcifications. Enlarged  mediastinal lymph nodes, similar to 11/15/2019, including a 15 mm  pretracheal lymph node (series 2, image 22). Prominent bilateral  axillary lymph nodes are also unchanged, including a 12 mm left  axillary lymph node (series 2, image 12). Changes of right axillary  lymph node dissection with 2.1 cm seroma.    UPPER ABDOMEN: Limited evaluation of the upper abdomen. Calcified  granulomas in the enlarged spleen. Small hiatal hernia.     BONES/SOFT TISSUES: Unchanged multilevel chronic compression  deformities of the thoracic and lumbar spine, since the prior exam on  11/15/2019. The bones appear diffusely osteopenic.      Impression    IMPRESSION:   1. Since the prior CT on 11/15/2019, the previously seen groundglass  opacities in the upper lobes have resolved. There are persistent areas  of micronodularity, greatest in the right middle lobe and lower lobes,  which may be infectious or inflammatory in etiology.  2. Trace bilateral pleural effusions, decreased in size from the prior  exam.  3. Smooth interlobular septal thickening, suggestive of pulmonary  edema.  4. Mildly enlarged mediastinal and  bilateral axillary lymph nodes,  unchanged from the prior exam, which may be reactive. Right axillary  lymphocele has developed.  5. Unchanged multilevel chronic compression deformities of the  thoracic and lumbar spine.  6. Splenomegaly.    I have personally reviewed the examination and initial interpretation  and I agree with the findings.    YAZMIN BISHOP MD   INR   Result Value Ref Range    INR 1.53 (H) 0.86 - 1.14   Partial thromboplastin time   Result Value Ref Range    PTT 47 (H) 22 - 37 sec   Fibrinogen activity   Result Value Ref Range    Fibrinogen 672 (H) 200 - 420 mg/dL   CBC with platelets differential   Result Value Ref Range    WBC 2.3 (L) 4.0 - 11.0 10e9/L    RBC Count 2.57 (L) 4.4 - 5.9 10e12/L    Hemoglobin 8.2 (L) 13.3 - 17.7 g/dL    Hematocrit 24.0 (L) 40.0 - 53.0 %    MCV 93 78 - 100 fl    MCH 31.9 26.5 - 33.0 pg    MCHC 34.2 31.5 - 36.5 g/dL    RDW 21.3 (H) 10.0 - 15.0 %    Platelet Count 20 (LL) 150 - 450 10e9/L    Diff Method Manual Differential     % Neutrophils 82.3 %    % Lymphocytes 9.7 %    % Monocytes 0.9 %    % Eosinophils 6.2 %    % Basophils 0.0 %    % Metamyelocytes 0.9 %    Absolute Neutrophil 1.9 1.6 - 8.3 10e9/L    Absolute Lymphocytes 0.2 (L) 0.8 - 5.3 10e9/L    Absolute Monocytes 0.0 0.0 - 1.3 10e9/L    Absolute Eosinophils 0.1 0.0 - 0.7 10e9/L    Absolute Basophils 0.0 0.0 - 0.2 10e9/L    Absolute Metamyelocytes 0.0 0 10e9/L    Anisocytosis Slight     Macrocytes Present    Comprehensive metabolic panel   Result Value Ref Range    Sodium 132 (L) 133 - 144 mmol/L    Potassium 3.4 3.4 - 5.3 mmol/L    Chloride 97 94 - 109 mmol/L    Carbon Dioxide 25 20 - 32 mmol/L    Anion Gap 10 3 - 14 mmol/L    Glucose 128 (H) 70 - 99 mg/dL    Urea Nitrogen 50 (H) 7 - 30 mg/dL    Creatinine 2.57 (H) 0.66 - 1.25 mg/dL    GFR Estimate 26 (L) >60 mL/min/[1.73_m2]    GFR Estimate If Black 30 (L) >60 mL/min/[1.73_m2]    Calcium 8.6 8.5 - 10.1 mg/dL    Bilirubin Total 1.8 (H) 0.2 - 1.3 mg/dL     Albumin 2.5 (L) 3.4 - 5.0 g/dL    Protein Total 5.7 (L) 6.8 - 8.8 g/dL    Alkaline Phosphatase 190 (H) 40 - 150 U/L    ALT 17 0 - 70 U/L    AST 4 0 - 45 U/L   Lactic acid level STAT   Result Value Ref Range    Lactate for Sepsis Protocol 2.4 (H) 0.7 - 2.0 mmol/L   Ferritin   Result Value Ref Range    Ferritin 2,835 (H) 26 - 388 ng/mL   Triglycerides   Result Value Ref Range    Triglycerides 156 (H) <150 mg/dL   Blood culture   Result Value Ref Range    Specimen Description Blood Right Arm     Special Requests Aerobic and anaerobic bottles received     Culture Micro No growth after 7 hours    Blood culture yeast   Result Value Ref Range    Specimen Description Blood Right Arm     Special Requests Aerobic and anaerobic bottles received     Culture Micro No growth after 7 hours    Lactic acid whole blood   Result Value Ref Range    Lactic Acid 1.6 0.7 - 2.0 mmol/L   Blood culture   Result Value Ref Range    Specimen Description Blood Left Arm     Culture Micro No growth after 2 hours    Blood culture yeast   Result Value Ref Range    Specimen Description Blood Left Arm     Culture Micro No growth after 2 hours    Basic metabolic panel   Result Value Ref Range    Sodium 131 (L) 133 - 144 mmol/L    Potassium 3.7 3.4 - 5.3 mmol/L    Chloride 99 94 - 109 mmol/L    Carbon Dioxide 23 20 - 32 mmol/L    Anion Gap 9 3 - 14 mmol/L    Glucose 108 (H) 70 - 99 mg/dL    Urea Nitrogen 50 (H) 7 - 30 mg/dL    Creatinine 2.22 (H) 0.66 - 1.25 mg/dL    GFR Estimate 31 (L) >60 mL/min/[1.73_m2]    GFR Estimate If Black 36 (L) >60 mL/min/[1.73_m2]    Calcium 7.9 (L) 8.5 - 10.1 mg/dL   CBC with platelets differential   Result Value Ref Range    WBC 2.0 (L) 4.0 - 11.0 10e9/L    RBC Count 2.57 (L) 4.4 - 5.9 10e12/L    Hemoglobin 8.2 (L) 13.3 - 17.7 g/dL    Hematocrit 23.5 (L) 40.0 - 53.0 %    MCV 91 78 - 100 fl    MCH 31.9 26.5 - 33.0 pg    MCHC 34.9 31.5 - 36.5 g/dL    RDW 21.7 (H) 10.0 - 15.0 %    Platelet Count 11 (LL) 150 - 450 10e9/L     Diff Method Manual Differential     % Neutrophils 73.3 %    % Lymphocytes 6.0 %    % Monocytes 2.6 %    % Eosinophils 14.7 %    % Basophils 0.0 %    % Metamyelocytes 3.4 %    Absolute Neutrophil 1.5 (L) 1.6 - 8.3 10e9/L    Absolute Lymphocytes 0.1 (L) 0.8 - 5.3 10e9/L    Absolute Monocytes 0.1 0.0 - 1.3 10e9/L    Absolute Eosinophils 0.3 0.0 - 0.7 10e9/L    Absolute Basophils 0.0 0.0 - 0.2 10e9/L    Absolute Metamyelocytes 0.1 (H) 0 10e9/L    Anisocytosis Moderate     Poikilocytosis Slight     Shirley Cells Slight    Bilirubin direct   Result Value Ref Range    Bilirubin Direct 0.8 (H) 0.0 - 0.2 mg/dL         OVERALL PLAN   Angel Yanez is a 60 yo man D+207 s/p 2nd autologous transplant for MM.   S/p 2 readmissions for FUO (10/16-10/23; 9/23-9/29/19).      1.  BMT/MM:   Slow engraftment; cell dose was only 0.639x10^6. ([facility] [place] - known poor cell dose as failed chemo-mobilization 1/2019.)   - Stringent CR.  - day +180 bone marrow biopsy 11/6/19 which showed no morphologic or immunophenotypic evidence of plasma cell neoplasm. His light chains were not elevated and he had no monoclonal protein in the serum.  He is in clinical remission.  - PET in 8/2019 clear.   - PB FLOW 11/23: No overt aberrant immunophenotype on T cells.  Rare to absent mature B cells and plasma cells.      2.  HEME: Keep Hgb>8g/dL, plt >10 . Plt and RBCs x2 units given 12/9  -needs plts 50,000 or greater for LN biopsy tomorrow if can arrange by interventional pulmonary.  Last GCF on 12/1/2019, WBC trending down.  Give for ANC 1000 or less.  - anemic, thrombocytopenic post BMT, low cell dose, +/- infection.     - No evidence of HLH on lymphnode bx 10/18/19.     - Persistent thrombocytopenia: BM 11/6/19 showed adequate granulopoiesis and erythropoiesis but his platelet precursors were 0 to absent which is reflected in peripheral CBC. Started promactic 50mcg daily on 11/10. Has been titrated up to 150mg daily (started 11/23); no change. Promacta  stopped 12/6/2019  - Hemolysis eval neg on 11/10/2019     3.  ID: Fevers to 104 the last 3 days on celebrex 2x/day, tylenol 1000mg 2x/day.   STOP celebrex 12/9 due to FARZANA. Scheduled tylenol BID on admit  With SOB, obtained RVP, pending  Blood cx on 12/9 and 12/10 are neg to date  CT chest is stable with GGO better.   Hx FUO  A.) Extensive ID work up unremarkable  sept 2019 and oct and again Nov. 10/16 and 11/9 repeat chest CT: persistent mediastinal adenopathy no paranchymal disease. S/P bx of L axillary node on 10/18: No evidence of HLH, tissues looks reactive.   - s/p excisional R axillary lymph node bx 11/15/19 (neg core bx 10/18 L axillary). Bx sent to [facility] 11/26 -Called surg path and they faxed copy of LN biopsy consult from [facility]- reviewed report with Dr Mukherjee today.  No definitive diagnosis.  - HTLV neg with very low lymphocyte count. CD4 count 146.  - Per ID with negative w/u and not neutropenic discontinued cefepime 11/12. 11/15 CT Chest new GGO.  RVP neg. Pulm consulted. No indication for BAL or further treatment/testing. Completed a zpak 11/21.  Saw ID outpatient on 12/3/2019: see note.  - Per ID, possible histoplasmosis: empiric itraconazole 200 TID started  with fevers initially resolved but then recurred; admitted on bid dosing. Hold itraconazole with elevated LFT and recurrence of fever. 10/18 Histo/blasto urine antigen negative. Per Dr Kelley flucaonzole can suppress growth? 10/18 [place] DNA test- negative. 1/12 itra level=1.6.    Right LNTissue culture (11/15): positive for stenotrophomonas malophilia and Ochrobactrum anthropi from broth only. Sensitive to bactrim and levaquin. Given culture + in broth only, this is likely contaminant but given FUO will treat empirically. Was on  Levaquin 750mg daily 11/26, stopped 12/6 as no impact to fever trends.   - As per ID's note 12/3 and after discussion with Dr Lucio, STOPped itraconazole on 12/9 as no impact to fever trend (could also be potentiation FARZANA  offending medications)  B.) Autoimmune workup so far negative. Rheum consult, last note on 11/14: felt no evidence of autoimmune disease flair neg studies above but recommended evaluate possible granulomatous finding with lymph nose biopsy to r/o sarcoidosis in the setting of FUO and elevated soluble IL-2 RA--  C.) Malignancy W/U: Neg BM BX 11/6. Neg PET in 8/2019. Neg core lymph node needle bx 10/18. PSA neg. Admit CT with R kidney hypoenhancing ill-defined focus. MRI 11/13 not suggestive of urothelial or renal cell carcinoma.   Prophy: (HOLD ACV with FARZANA), on pentamidine (11/19) with pancytopenias   - influenza vaccination given 11/26/19          4. Pulm:  Sob again with exertion over the weekend.  Still with chronic, non productive cough.  Repeat CT on 12/9/19 with improved GGO and stable nodularity and pleural effusion smaller.  Boarder lines sats, decreased to 89% with coughing jag.  Walked with O2 sat monitor in clinic without drop.  -no antibiotics with stable to improved CT.  RVP from 12/9 pending.  - 10/17: Echo with preserved EF, no evidence of right heart strain. Repeat echo 11/14 stable.  - 10/17 VQ scan neg for PE     5. GI: Nausea with vomiting this weekend. 12/9: New diarrhea, belly cramping x~2 days  12/9 C diff/ enteric =neg.  Adeno pending.  Imodium prn.  - LFTs wnl 12/1 but increased on admission.  INcluding elevated bilirubin, alk phos.  Itraconazole is on hold.  Direct bili added on.     6.  FEN/Renal:   New FARZANA to 2.57.  Hold celebrex and acyclovir.  FENA, UA/UC is pending     7.  Mood: Continue Paxil.     8. Derm:   - recent rash on torso, now resolved. Derm consuledt. 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   Reemerged 12/9, resume triamcinolone TID on belly, back   9. Dispo:  After discussion with Dr Mukherjee, looking for common cause. DDX:  Sarcoidosis? Stable but increased LAP, splenomegaly with  possible calcified nodules, prolonged cytopenias, high sIL2, and lack of deterioration over 3 months of no Dx argue against infection and cancer, and suggest autoimmunity e.g. sarcoid. Asked pulmonary to biopsy a mediastinal node, will check ACE, VitD3 and T cell subset panel, and will ask pathology to review his recent marrow for granulomas. Will treat him with steroids tomorrow or after the biopsy, whichever later.      Funmi Ospina PA-C  [id number]

## 2019-12-10 NOTE — CONSULTS
Interventional Pulmonology          Initial Inpatient Consultation Note                                     December 10, 2019            Patient: Angel Yanez    Date of Admission: 12/9/2019  Reason for Consultation: Lymph node biospy  Requesting Physician: BMT service      Assessment:   Angel Yanez is a 59 year old gentleman admitted on 12/9/2019 with dyspnea and FUO. Interventional Pulmonology is consulted today for consideration of EBUS-TBNA. History of MM s/p auto 7 months ago. Known right sided hilar calcifications and splenic enlargement with granulomata. New mediastinal (stations 4R and 7) lymphadenopathy noted on a 9/24/19 CT chest (new as compared to an 8/2019 PET/CT and prior dedicated CT chest studies) which has remained persistent and otherwise unchanged on subsequent imaging including 11/2019, 10/2019, and 12/2019 studies. It is a somewhat atypical pattern (4R, 7 enlargement with unilateral hilar calcifications), but it's certainly possible his hematologic history has served as a nidus for the evolution of an underlying granulomatous process.  The contemporaneous development of a sarcoidosis phenomena is well established with lymphoma, but there is some (albeit meager) data linking sarcoidosis with other lymphoproliferative malignancies such as multiple myeloma. I'm not sure how to interpret the findings of his recent axillary biopsy (see below) into the greater context of his current lymphadenopathy. His low EBV titers are noted (such as in thinking about PTLD). Infection is all but ruled out it seems. Curious that his PET/CT was negative, although I also appreciate he had no significant lymphadenopathy yet on that 8/2019 study. At this point, moving forward with bronchoscopy for BAL and lymph node TBNA is reasonable. We may be able to accommodate this in endoscopy tomorrow however his thrombocytopenia will need to be corrected first.     Plan:    Scheduled for Endoscopy at 1400 on  "12/11/19    NPO order placed by primary service for midnight    Please start transfusing platelets for goal of >/ 50,000    Will need 0.3 mcg/kg of DDAVP IV within an hour of the procedure     This plan has been discussed with the patient and primary service        Thank you for this consultation, we will continue to follow along. Please page [phone] with any questions. For future Interventional Pulmonology consults, the Interventional Pulmonology consult order is now active within the inpatient Pulmonary consult panel.               Chief Complaint: \"I was told my lymph nodes were enlarged and I may need a biopsy\"    History of Present Illness:   Angel Yanez is a 59 year old gentleman admitted on 12/9/2019 with dyspnea, GI symptomatology, pre-syncope, and Tmax of 104F (history of FUO with extensive work-up thus far). Mr. Yanez has a history of MM s/p auto 7 months ago. Over the last several months, he has demonstrated a combination of mediastinal and axillary lymphadenopathy, splenomegaly, FUO and prolonged cytopenias. His EBV titer was <500 on 11/12/19 (in the 1-2K range prior to that). Clinically, infection and malignancy are low on the differential diagnosis and the question of a granulomatous process was raised and so Interventional Pulmonology has been consulted for consideration of lymph node FNA. On 11/15 he did have a R axillary LN biopsy which demonstrated atypical paracortical lymphoid proliferation, focal involvement by plasma cell myeloma, negative GMS/AFB, negative EBV, and no reports of granulomata.     In terms of imaging, the first CT chest on file is from 4/2017 which shows calcified R hilar lymph nodes which are non-enlarged, and oblong 4R and 7 mediastinal nodes which are <10 mm in the short axis. The same is true on studies from 5/30/19 and 7/21/19. An 8/23/19 PET/CT was reported to be negative, however I do question hilar uptake (it's possible the SUV's were not above the baseline " activity). A CT chest on 9/24/19 now shows new, unilateral mediastinal lymphadenopathy with station 4R enlargement to 13 mm, and station 7 enlargement to 19 mm in short axis. No clear enlargement of the hilar nodes, but difficult to assess on these non-contrast enhanced studies. The degree of lymphadenopathy remained more or less unchanged through repeat scans on 11/15/19 and 12/9/19; the last scan showing station 4R as approximally 13x20 mm, and station 7 is 18x26 mm. Calcified granuloma can be seen through the spleen on the more recent studies.    He is on 2 LPM at rest which is reported to be for comfort only without documentation of hypoxemia at rest. Unknown with activity. He is reported to desaturated to the high 80's during coughing spells with generally quick recovery. When I spoke to Mr. Yanez this afternoon, he was saturating 95% on room air during conversation. He denies any history of Sarcoidosis or other known lung disease. He works as a  although now is more involved on the designing side of things. Has a remote 10 PY smoking history (quit 30 years ago) and is from the [facility] [place] [place] area. He endorses intermittent cough and dyspnea which seems to occur during times of fever. He has had a rash on his trunk which is non-painful. No reports of EN, cardiac issues or occular issues.            Data:   All pertinent laboratory and imaging data reviewed.      12/10/19 Labs:   Elevated BUN of 50, hemoglobin 7, WBC 2.0, platelets 11, INR 1.53 (12/9)    12/9/19 vs 11/15/19 vs 10/16/19 CT:  On my interpretation, no interval change in degree of stations 4R and 7 enlargement      12/9/19 vs 11/15/19 vs 10/16/19 CT:  On my interpretation, no interval change in degree of stations 4R and 7 enlargement      11/15/19 R axillary LN biopsy:  Atypical paracortical lymphoid proliferation (see comment)   Negative for EBV by in situ hybridization   Negative for fungi and acid fast bacteria per GMS and AFB  Angie stained tissue, respectively   Focal involvement by plasma cell myeloma     11/14/19 TTE:  LVEf 50-55%           Past Medical History:     Past Medical History:   Diagnosis Date     GERD (gastroesophageal reflux disease)      H/O autologous stem cell transplant (H) 02/2005     Hyperlipidemia      Multiple myeloma (H) 2004     PONV (postoperative nausea and vomiting)      Psoriasis      Psoriatic arthritis (H)             Past Surgical History:     Past Surgical History:   Procedure Laterality Date     ARTHROPLASTY HIP       BIOPSY LYMPH NODE AXILLA N/A 11/15/2019    Procedure: Right axillary lymph node biopsy;  Surgeon: Reese Irving MD;  Location: [facility] OR     COLONOSCOPY       HERNIA REPAIR       IR CVC TUNNEL PLACEMENT > 5 YRS OF AGE  1/22/2019     IR CVC TUNNEL PLACEMENT > 5 YRS OF AGE  5/16/2019     IR CVC TUNNEL REMOVAL LEFT  2/20/2019     IR CVC TUNNEL REMOVAL RIGHT  7/23/2019     IR FOLLOW UP VISIT OUTPATIENT  1/24/2019     IR LYMPH NODE BIOPSY  10/18/2019     ORTHOPEDIC SURGERY       PROCURE BONE MARROW N/A 5/14/2019    Procedure: Bone Marrow [place];  Surgeon: Antwan Erickson MD;  Location: [facility] OR     TRANSPLANT              Social History:      Works for [employer], 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 He has not worked in the field since ~ 2005. He lives in [place] with his wife & dog. His wife does the yardwork. There are outdoor birds on the property. 2 of their children live in [place]. No prior TB exposures. History of 10 PY smoking history, quit 30 years ago.          Family History:     Family History   Problem Relation Age of Onset     Diabetes Mother      Cerebrovascular Disease Mother      Leukemia Father             Allergies:     Allergies   Allergen Reactions     Avalide Hives     Chlorhexidine Itching     Chloroxylenol Rash     Technicare solution     Lorazepam Hives     Moxifloxacin Hives            Medications:       acetaminophen  975 mg Oral BID     calcium  carbonate  1,200 mg Oral Daily     omeprazole  40 mg Oral BID AC     PARoxetine  20 mg Oral QAM     phytonadione  5 mg Oral Daily     triamcinolone   Topical TID     Vitamin D3  2,000 Units Oral Daily            Review of Systems:   A 12 point review of systems is negative aside for as noted above         Physical Exam:   Temp:  [97.8  F (36.6  C)-103.3  F (39.6  C)] 98.3  F (36.8  C)  Pulse:  [] 68  Resp:  [16-24] 20  BP: (105-154)/(64-83) 105/64  SpO2:  [90 %-96 %] 94 %  General: Awake, alert and oriented, RASS -1  EENT: mmm  Neck: Trachea supple/midline  Lungs: CTAB, no adventitious lung sounds, SpO2 95% on room air  Cardiovascular: Normal S1 and S2.  RRR.    Abdomen: Soft, non-tender, non-distended   MSK: No edema, no clubbing   Neurologic: AOx3, moving all extremities   Skin: Warm/dry        Tom Patino MD, 12/10/2019, 1:27 PM  Interventional Pulmonology Fellow  Department of Pulmonary, Allergy, Critical Care & Sleep Medicine   Pager: [phone]

## 2019-12-10 NOTE — PLAN OF CARE
Pt arrived on unit at 430p,. VSS on RA. Denies chest pain. Stated SOB improved compared to when at clinic. Stats at 95%. Educated pt to call right away when feeling SOB worsen. PIV infusing NS 50ml/hr. Regular diet. Cheat CT done and waiting for result. Enteric and respiratory panel pending, sample collected at clinic. SBA due to light headedness. Denies N/V, numbness and tingling. Due to void. Spouse at bedside. Unable to get full body/skin assessment; finished with admission question and pt sent down to CT. Continue to monitor and follow POC.

## 2019-12-10 NOTE — PROGRESS NOTES
Sepsis Evaluation Progress Note    I was called to see Angel Yanez due to abnormal vital signs triggering the Sepsis SIRS screening alert. He is known to have an infection.     Physical Exam   Vital Signs:  Temp: 98.2  F (36.8  C) Temp src: Oral BP: 133/75 Pulse: 80   Resp: 16 SpO2: 94 % O2 Device: Nasal cannula Oxygen Delivery: 1 LPM    Lab:  Lactic Acid   Date Value Ref Range Status   12/10/2019 1.6 0.7 - 2.0 mmol/L Final     Lactate for Sepsis Protocol   Date Value Ref Range Status   12/09/2019 2.4 (H) 0.7 - 2.0 mmol/L Final     Comment:     Significant value called to and read back by  EULALIO FELICIANO ON 12/09/19 AT 2215 BY CLAUDIA         The patient is at baseline mental status.         Assessment & Plan   NO EVIDENCE OF SEPSIS at this time.  Vital sign, physical exam, and lab findings are likely due to ?HLH .  Improved to 1.6 post 500ml bolus   Disposition: The patient will remain on the current unit. We will continue to monitor this patient closely.  Eleazar Carrasco MD    Sepsis Criteria   Sepsis: 2+ SIRS criteria due to infection  Severe Sepsis: Sepsis AND 1+ new sign of acute organ dysfunction (Note: lactate >2 is organ dysfunction)  Septic Shock: Sepsis AND hypotension despite volume resuscitation with 30 ml/kg crystalloid

## 2019-12-10 NOTE — PROGRESS NOTES
ATTENDING ADDENDUM:    I have independently seen and evaluated the patient on December 10, 2019 and reviewed clinical, laboratory, and radiographic findings. I have discussed the plan with the team and agree with the attached note with the following edits:    Angel Yanez is a 59 year old year old male, with MM s/p auto 7 months ago, and FUO for a few months, here for FARZANA.    Ph/E: Vitals reviewed. No distress. Oropharynx clear. Neck supple. Heart RRR. Lungs clear. Abdomen soft. No peripheral edema. Non-confluent macular pink rash on chest. Neuro nonfocal.     A&P: Mild but generalized LAP, splenomegaly with possible calcified nodules, prolonged cytopenias, high sIL2, and lack of deterioration over 3 months of no Dx argue against infection and cancer, and suggest autoimmunity e.g. sarcoid. Will ask pulmonary to biopsy a mediastinal node, will check ACE, VitD3 and T cell subset panel, and will ask pathology to review his recent marrow for granulomas. Will treat him with steroids tomorrow or after the biopsy, whichever later.        acetaminophen  975 mg Oral BID     omeprazole  40 mg Oral BID AC     PARoxetine  20 mg Oral QAM     triamcinolone   Topical TID       Kody Mukherjee MD

## 2019-12-11 LAB
1,25(OH)2D SERPL-MCNC: >200 PG/ML (ref 19.9–79.3)
ABO + RH BLD: NORMAL
ABO + RH BLD: NORMAL
ANION GAP SERPL CALCULATED.3IONS-SCNC: 10 MMOL/L (ref 3–14)
ANISOCYTOSIS BLD QL SMEAR: ABNORMAL
BACTERIA SPEC CULT: NO GROWTH
BASOPHILS # BLD AUTO: 0 10E9/L (ref 0–0.2)
BASOPHILS NFR BLD AUTO: 0 %
BLD GP AB SCN SERPL QL: NORMAL
BLD PROD TYP BPU: NORMAL
BLD UNIT ID BPU: 0
BLOOD BANK CMNT PATIENT-IMP: NORMAL
BLOOD PRODUCT CODE: NORMAL
BPU ID: NORMAL
BUN SERPL-MCNC: 46 MG/DL (ref 7–30)
CA-I BLD-MCNC: 4.5 MG/DL (ref 4.4–5.2)
CALCIUM SERPL-MCNC: 8.3 MG/DL (ref 8.5–10.1)
CD19 CELLS # BLD: 1 CELLS/UL (ref 107–698)
CD19 CELLS NFR BLD: <1 % (ref 6–27)
CD3 CELLS # BLD: 284 CELLS/UL (ref 603–2990)
CD3 CELLS NFR BLD: 92 % (ref 49–84)
CD3+CD4+ CELLS # BLD: 128 CELLS/UL (ref 441–2156)
CD3+CD4+ CELLS NFR BLD: 41 % (ref 28–63)
CD3+CD4+ CELLS/CD3+CD8+ CLL BLD: 0.82 % (ref 1.4–2.6)
CD3+CD8+ CELLS # BLD: 154 CELLS/UL (ref 125–1312)
CD3+CD8+ CELLS NFR BLD: 50 % (ref 10–40)
CD3-CD16+CD56+ CELLS # BLD: 20 CELLS/UL (ref 95–640)
CD3-CD16+CD56+ CELLS NFR BLD: 7 % (ref 4–25)
CHLORIDE SERPL-SCNC: 99 MMOL/L (ref 94–109)
CO2 SERPL-SCNC: 23 MMOL/L (ref 20–32)
CREAT SERPL-MCNC: 1.5 MG/DL (ref 0.66–1.25)
DIFFERENTIAL METHOD BLD: ABNORMAL
EOSINOPHIL # BLD AUTO: 0.3 10E9/L (ref 0–0.7)
EOSINOPHIL NFR BLD AUTO: 14.4 %
ERYTHROCYTE [DISTWIDTH] IN BLOOD BY AUTOMATED COUNT: 22.3 % (ref 10–15)
GFR SERPL CREATININE-BSD FRML MDRD: 49 ML/MIN/{1.73_M2}
GLUCOSE SERPL-MCNC: 103 MG/DL (ref 70–99)
HADV AG STL QL IA: NEGATIVE
HCT VFR BLD AUTO: 22.4 % (ref 40–53)
HGB BLD-MCNC: 7.6 G/DL (ref 13.3–17.7)
IFC SPECIMEN: ABNORMAL
LACTATE BLD-SCNC: 1.6 MMOL/L (ref 0.7–2)
LYMPHOCYTES # BLD AUTO: 0.2 10E9/L (ref 0.8–5.3)
LYMPHOCYTES NFR BLD AUTO: 9.9 %
Lab: NORMAL
MCH RBC QN AUTO: 31.8 PG (ref 26.5–33)
MCHC RBC AUTO-ENTMCNC: 33.9 G/DL (ref 31.5–36.5)
MCV RBC AUTO: 94 FL (ref 78–100)
MONOCYTES # BLD AUTO: 0.1 10E9/L (ref 0–1.3)
MONOCYTES NFR BLD AUTO: 3.6 %
MYELOCYTES # BLD: 0 10E9/L
MYELOCYTES NFR BLD MANUAL: 0.9 %
NEUTROPHILS # BLD AUTO: 1.4 10E9/L (ref 1.6–8.3)
NEUTROPHILS NFR BLD AUTO: 71.2 %
NRBC # BLD AUTO: 0 10*3/UL
NRBC BLD AUTO-RTO: 1 /100
NUM BPU REQUESTED: 1
PLATELET # BLD AUTO: 23 10E9/L (ref 150–450)
PLATELET # BLD AUTO: 24 10E9/L (ref 150–450)
PLATELET # BLD EST: ABNORMAL 10*3/UL
POTASSIUM SERPL-SCNC: 3.6 MMOL/L (ref 3.4–5.3)
RBC # BLD AUTO: 2.39 10E12/L (ref 4.4–5.9)
SODIUM SERPL-SCNC: 133 MMOL/L (ref 133–144)
SPECIMEN EXP DATE BLD: NORMAL
SPECIMEN SOURCE: NORMAL
TRANSFUSION STATUS PATIENT QL: NORMAL
WBC # BLD AUTO: 2 10E9/L (ref 4–11)

## 2019-12-11 PROCEDURE — 36415 COLL VENOUS BLD VENIPUNCTURE: CPT | Performed by: PHYSICIAN ASSISTANT

## 2019-12-11 PROCEDURE — 25000131 ZZH RX MED GY IP 250 OP 636 PS 637: Performed by: PHYSICIAN ASSISTANT

## 2019-12-11 PROCEDURE — P9040 RBC LEUKOREDUCED IRRADIATED: HCPCS | Performed by: INTERNAL MEDICINE

## 2019-12-11 PROCEDURE — 25000132 ZZH RX MED GY IP 250 OP 250 PS 637: Performed by: INTERNAL MEDICINE

## 2019-12-11 PROCEDURE — 25000125 ZZHC RX 250: Performed by: PHYSICIAN ASSISTANT

## 2019-12-11 PROCEDURE — 36415 COLL VENOUS BLD VENIPUNCTURE: CPT | Performed by: INTERNAL MEDICINE

## 2019-12-11 PROCEDURE — 25000132 ZZH RX MED GY IP 250 OP 250 PS 637: Performed by: STUDENT IN AN ORGANIZED HEALTH CARE EDUCATION/TRAINING PROGRAM

## 2019-12-11 PROCEDURE — P9073 PLATELETS PHERESIS PATH REDU: HCPCS | Performed by: PHYSICIAN ASSISTANT

## 2019-12-11 PROCEDURE — 87040 BLOOD CULTURE FOR BACTERIA: CPT | Performed by: INTERNAL MEDICINE

## 2019-12-11 PROCEDURE — 85025 COMPLETE CBC W/AUTO DIFF WBC: CPT | Performed by: INTERNAL MEDICINE

## 2019-12-11 PROCEDURE — 25000132 ZZH RX MED GY IP 250 OP 250 PS 637: Performed by: PHYSICIAN ASSISTANT

## 2019-12-11 PROCEDURE — 85049 AUTOMATED PLATELET COUNT: CPT | Performed by: PHYSICIAN ASSISTANT

## 2019-12-11 PROCEDURE — 87103 BLOOD FUNGUS CULTURE: CPT | Performed by: INTERNAL MEDICINE

## 2019-12-11 PROCEDURE — 80048 BASIC METABOLIC PNL TOTAL CA: CPT | Performed by: INTERNAL MEDICINE

## 2019-12-11 PROCEDURE — 82330 ASSAY OF CALCIUM: CPT | Performed by: PHYSICIAN ASSISTANT

## 2019-12-11 PROCEDURE — 83605 ASSAY OF LACTIC ACID: CPT

## 2019-12-11 PROCEDURE — P9037 PLATE PHERES LEUKOREDU IRRAD: HCPCS | Performed by: PHYSICIAN ASSISTANT

## 2019-12-11 PROCEDURE — 20600000 ZZH R&B BMT

## 2019-12-11 PROCEDURE — 25000128 H RX IP 250 OP 636: Performed by: PHYSICIAN ASSISTANT

## 2019-12-11 RX ADMIN — OMEPRAZOLE 40 MG: 20 CAPSULE, DELAYED RELEASE ORAL at 16:38

## 2019-12-11 RX ADMIN — PHYTONADIONE 5 MG: 10 INJECTION, EMULSION INTRAMUSCULAR; INTRAVENOUS; SUBCUTANEOUS at 08:16

## 2019-12-11 RX ADMIN — ACETAMINOPHEN 650 MG: 325 TABLET, FILM COATED ORAL at 04:00

## 2019-12-11 RX ADMIN — PAROXETINE HYDROCHLORIDE HEMIHYDRATE 20 MG: 20 TABLET, FILM COATED ORAL at 08:16

## 2019-12-11 RX ADMIN — ACETAMINOPHEN 975 MG: 325 TABLET, FILM COATED ORAL at 08:16

## 2019-12-11 RX ADMIN — ACETAMINOPHEN 975 MG: 325 TABLET, FILM COATED ORAL at 20:19

## 2019-12-11 RX ADMIN — TRIAMCINOLONE ACETONIDE: 1 CREAM TOPICAL at 08:19

## 2019-12-11 RX ADMIN — MELATONIN 2000 UNITS: at 08:16

## 2019-12-11 RX ADMIN — ACETAMINOPHEN 650 MG: 325 TABLET, FILM COATED ORAL at 00:03

## 2019-12-11 RX ADMIN — Medication 1200 MG: at 08:16

## 2019-12-11 RX ADMIN — OMEPRAZOLE 40 MG: 20 CAPSULE, DELAYED RELEASE ORAL at 08:16

## 2019-12-11 RX ADMIN — PREDNISONE 30 MG: 20 TABLET ORAL at 13:50

## 2019-12-11 RX ADMIN — TRIAMCINOLONE ACETONIDE: 1 CREAM TOPICAL at 20:15

## 2019-12-11 ASSESSMENT — ACTIVITIES OF DAILY LIVING (ADL)
ADLS_ACUITY_SCORE: 11

## 2019-12-11 NOTE — PLAN OF CARE
Tmax 100.7. OVSS. UA and cultures done. Adenovirus stool sample sent. Angiotensin-converting enzyme drawn as pt is being worked up for sarcoidosis. NPO except meds starting at midnight for biopsy tomorrow. One bag of plt given and parameters set to be >50 for procedure. Imodium given x1 for diarrhea. Continue with plan of care.     Problem: Adult Inpatient Plan of Care  Goal: Plan of Care Review  12/10/2019 1852 by Nicky Leija RN  Outcome: No Change     Problem: Adult Inpatient Plan of Care  Goal: Patient-Specific Goal (Individualization)  12/10/2019 1852 by Nicky Leija RN  Outcome: No Change     Problem: Adult Inpatient Plan of Care  Goal: Absence of Hospital-Acquired Illness or Injury  12/10/2019 1852 by Nicky Leija RN  Outcome: No Change     Problem: Adult Inpatient Plan of Care  Goal: Optimal Comfort and Wellbeing  12/10/2019 1852 by Nicky Leija RN  Outcome: No Change     Problem: Adult Inpatient Plan of Care  Goal: Readiness for Transition of Care  12/10/2019 1852 by Nicky Leija RN  Outcome: No Change     Problem: Adult Inpatient Plan of Care  Goal: Rounds/Family Conference  12/10/2019 1852 by Nicky Leija RN  Outcome: No Change     Problem: Electrolyte Imbalance (Acute Kidney Injury/Impairment)  Goal: Serum Electrolyte Balance  12/10/2019 1852 by Nicky Leija RN  Outcome: No Change     Problem: Hematologic Alteration (Acute Kidney Injury/Impairment)  Goal: Hemoglobin, Hematocrit and Platelets Within Normal Range  12/10/2019 1852 by Nicky Leija RN  Outcome: No Change     Problem: Renal Function Impairment (Acute Kidney Injury/Impairment)  Goal: Effective Renal Function  12/10/2019 1852 by Nicky Leija RN  Outcome: No Change     Problem: Infection  Goal: Infection Symptom Resolution  12/10/2019 1852 by Nicky Leija RN  Outcome: No Change     Problem: Diarrhea  Goal: Fluid and Electrolyte Balance  12/10/2019 1852 by Nicky Leija RN  Outcome: No Change      Problem: Bleeding Risk or Actual  Goal: Absence of Bleeding  12/10/2019 1852 by Nicky Leija, RN  Outcome: No Change

## 2019-12-11 NOTE — PROGRESS NOTES
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   S/p 2 readmissions for FUO (10/16-10/23; 9/23-9/29/19).        Diagnosis MM Multiple myeloma  HCT Type Autologous    Prep Regimen Cytoxan  Melphalan   Donor Source No data was found    GVHD Prophylaxis No  Primary BMT Provider Naveed Ortiz was admitted on 12/9/2019 for FARZANA and increased sob with chronic cough.    INTERVAL  HISTORY    Continues with intermittent fevers.  Cough and sob is stable.  Had some diarrhea yesterday but better today.  Denies nausea.  Rash is stable to better. Does not itch.    Review of Systems: 10 point ROS negative except as noted above.      PHYSICAL EXAM     Weight In/Out     Wt Readings from Last 3 Encounters:   12/11/19 81.4 kg (179 lb 8 oz)   12/09/19 78.3 kg (172 lb 11.2 oz)   12/06/19 76.8 kg (169 lb 4.8 oz)      I/O last 3 completed shifts:  In: 3388.75 [P.O.:560; I.V.:1713.75]  Out: 750 [Urine:750]         /80 (BP Location: Left arm)   Pulse 80   Temp 100  F (37.8  C) (Axillary)   Resp 18   Wt 81.4 kg (179 lb 8 oz)   SpO2 93%   BMI 25.18 kg/m         General: NAD   Eyes: : FARRAH, sclera anicteric   Nose/Mouth/Throat: OP clear, buccal mucosa moist, no ulcerations   Lungs:  Some crackles on left base, right decreased bs but no crackles  Cardiovascular: RRR, no M/R/G   Abdominal/Rectal: +BS, soft, NT, ND, No HSM   Lymphatics: no edema  Skin: no  [place], Non-confluent macular pink rash on chest and back  Neuro: A&O   Additional Findings: Peripheral IV      LABS AND IMAGING - PAST 24 HOURS     Results for orders placed or performed during the hospital encounter of 12/09/19 (from the past 24 hour(s))   Basic metabolic panel   Result Value Ref Range    Sodium 133 133 - 144 mmol/L    Potassium 3.6 3.4 - 5.3 mmol/L    Chloride 99 94 - 109 mmol/L    Carbon Dioxide 23 20 - 32 mmol/L    Anion Gap 10 3 - 14 mmol/L    Glucose 103 (H) 70 - 99  mg/dL    Urea Nitrogen 46 (H) 7 - 30 mg/dL    Creatinine 1.50 (H) 0.66 - 1.25 mg/dL    GFR Estimate 49 (L) >60 mL/min/[1.73_m2]    GFR Estimate If Black 57 (L) >60 mL/min/[1.73_m2]    Calcium 8.3 (L) 8.5 - 10.1 mg/dL   CBC with platelets differential   Result Value Ref Range    WBC 2.0 (L) 4.0 - 11.0 10e9/L    RBC Count 2.39 (L) 4.4 - 5.9 10e12/L    Hemoglobin 7.6 (L) 13.3 - 17.7 g/dL    Hematocrit 22.4 (L) 40.0 - 53.0 %    MCV 94 78 - 100 fl    MCH 31.8 26.5 - 33.0 pg    MCHC 33.9 31.5 - 36.5 g/dL    RDW 22.3 (H) 10.0 - 15.0 %    Platelet Count 23 (LL) 150 - 450 10e9/L    Diff Method Manual Differential     % Neutrophils 71.2 %    % Lymphocytes 9.9 %    % Monocytes 3.6 %    % Eosinophils 14.4 %    % Basophils 0.0 %    % Myelocytes 0.9 %    Nucleated RBCs 1 (H) 0 /100    Absolute Neutrophil 1.4 (L) 1.6 - 8.3 10e9/L    Absolute Lymphocytes 0.2 (L) 0.8 - 5.3 10e9/L    Absolute Monocytes 0.1 0.0 - 1.3 10e9/L    Absolute Eosinophils 0.3 0.0 - 0.7 10e9/L    Absolute Basophils 0.0 0.0 - 0.2 10e9/L    Absolute Myelocytes 0.0 0 10e9/L    Absolute Nucleated RBC 0.0     Anisocytosis Moderate     Platelet Estimate Confirming automated cell count    Calcium ionized whole blood   Result Value Ref Range    Calcium Ionized Whole Blood 4.5 4.4 - 5.2 mg/dL   Blood culture   Result Value Ref Range    Specimen Description Blood Left Arm     Culture Micro No growth after 10 hours    Blood culture yeast   Result Value Ref Range    Specimen Description Blood Left Arm     Culture Micro No growth after 10 hours    Lactic acid whole blood   Result Value Ref Range    Lactic Acid 1.6 0.7 - 2.0 mmol/L   Platelets prepare order unit conditional   Result Value Ref Range    Blood Component Type PLT Pheresis     Units Ordered 1    Blood component   Result Value Ref Range    Unit Number [id number]     Blood Component Type PlateletPheresis,LeukoRed Irrad (Part 2)     Division Number 00     Status of Unit Released to care unit 12/11/2019 0507      Blood Product Code [id number]     Unit Status ISS    Platelets prepare order unit conditional   Result Value Ref Range    Blood Component Type PLT Pheresis     Units Ordered 1    Blood component   Result Value Ref Range    Unit Number [id number]     Blood Component Type GYDR-OYZWY-NT-PAS-1     Division Number 00     Status of Unit Released to care unit 12/11/2019 0818     Blood Product Code [id number]     Unit Status ISS    Platelet count   Result Value Ref Range    Platelet Count 24 (LL) 150 - 450 10e9/L   Platelets prepare order unit conditional   Result Value Ref Range    Blood Component Type PLT Pheresis     Units Ordered 1    Blood component   Result Value Ref Range    Unit Number [id number]     Blood Component Type PlateletPheresis,LeukoRed Irrad (Part 2)     Division Number 00     Status of Unit No longer available 12/11/2019 1137     Blood Product Code [id number]     Unit Status RET          OVERALL PLAN   Angel Yanez is a 58 yo man D+207 s/p 2nd autologous transplant for MM.   S/p 2 readmissions for FUO (10/16-10/23; 9/23-9/29/19).      1.  BMT/MM:   Slow engraftment; cell dose was only 0.639x10^6. ([place] - known poor cell dose as failed chemo-mobilization 1/2019.)   - Stringent CR.  - day +180 bone marrow biopsy 11/6/19 which showed no morphologic or immunophenotypic evidence of plasma cell neoplasm. His light chains were not elevated and he had no monoclonal protein in the serum.  He is in clinical remission.  - PET in 8/2019 clear.   - PB FLOW 11/23: No overt aberrant immunophenotype on T cells.  Rare to absent mature B cells and plasma cells.      2.  HEME: Keep Hgb>8g/dL, plt >10 .  Will get RBC today  -needs plts 50,000 or greater for LN biopsy today  by interventional pulmonary.  Last GCF on 12/1/2019, WBC trending down.  Give for ANC 1000 or less. No G-CSF today  - anemic, thrombocytopenic post BMT, low cell dose, +/- infection.     - No evidence of HLH on lymphnode bx 10/18/19.     -  Persistent thrombocytopenia: BM 11/6/19 showed adequate granulopoiesis and erythropoiesis but his platelet precursors were 0 to absent which is reflected in peripheral CBC. Started promactic 50mcg daily on 11/10. Has been titrated up to 150mg daily (started 11/23); no change. Promacta stopped 12/6/2019  - Hemolysis eval neg on 11/10/2019     3.  ID: Fevers to 101 overnight.  Celebrex is on hold, tylenol 1000mg 2x/day.   STOP celebrex 12/9 due to FARZANA. Scheduled tylenol BID on admit  With SOB, obtained RVP=neg  Blood cx on 12/9 and 12/10 are neg to date  CT chest is stable with GGO better.   Hx FUO  A.) Extensive ID work up unremarkable  sept 2019 and oct and again Nov. 10/16 and 11/9 repeat chest CT: persistent mediastinal adenopathy no paranchymal disease. S/P bx of L axillary node on 10/18: No evidence of HLH, tissues looks reactive.   - s/p excisional R axillary lymph node bx 11/15/19 (neg core bx 10/18 L axillary). Bx sent to [facility] 11/26 -Called surg path and they faxed copy of LN biopsy consult from [facility]- reviewed report with Dr Mukherjee today.  No definitive diagnosis.  - HTLV neg with very low lymphocyte count. CD4 count 146.  - Per ID with negative w/u and not neutropenic discontinued cefepime 11/12. 11/15 CT Chest new GGO.  RVP neg. Pulm consulted. No indication for BAL or further treatment/testing. Completed a zpak 11/21.  Saw ID outpatient on 12/3/2019: see note.  - Per ID, possible histoplasmosis: empiric itraconazole 200 TID started  with fevers initially resolved but then recurred; admitted on bid dosing. Hold itraconazole with elevated LFT and recurrence of fever. 10/18 Histo/blasto urine antigen negative. Per Dr Kelley flucaonzole can suppress growth? 10/18 [place]us DNA test- negative. 1/12 itra level=1.6.    Right LNTissue culture (11/15): positive for stenotrophomonas malophilia and Ochrobactrum anthropi from broth only. Sensitive to bactrim and levaquin. Given culture + in broth only, this is likely  contaminant but given FUO will treat empirically. Was on  Levaquin 750mg daily 11/26, stopped 12/6 as no impact to fever trends.   - As per ID's note 12/3 and after discussion with Dr Lucio, Stopped itraconazole on 12/9 as no impact to fever trend (could also be potentiation FARZANA offending medications)  B.) Autoimmune workup so far negative. Rheum consult, last note on 11/14: felt no evidence of autoimmune disease flair neg studies above but recommended evaluate possible granulomatous finding with lymph nose biopsy to r/o sarcoidosis in the setting of FUO and elevated soluble IL-2 RA--  C.) Malignancy W/U: Neg BM BX 11/6. Neg PET in 8/2019. Neg core lymph node needle bx 10/18. PSA neg. Admit CT with R kidney hypoenhancing ill-defined focus. MRI 11/13 not suggestive of urothelial or renal cell carcinoma.   Prophy: (HOLD ACV with FARZANA), on pentamidine (11/19) with pancytopenias   - influenza vaccination given 11/26/19          4. Pulm:  Sob again with exertion over the weekend.  Still with chronic, non productive cough.  Repeat CT on 12/9/19 with improved GGO and stable nodularity and pleural effusion smaller.  Boarder lines sats, decreased to 89% with coughing jag.  Walked with O2 sat monitor in clinic without drop.  -no antibiotics with stable to improved CT.  RVP from 12/9 pending.  - 10/17: Echo with preserved EF, no evidence of right heart strain. Repeat echo 11/14 stable.  - 10/17 VQ scan neg for PE     5. GI: Nausea with vomiting this weekend. 12/9: New diarrhea, belly cramping x~2 days  12/9 C diff/ enteric =neg.  Adeno=neg.  Imodium prn.  - LFTs wnl 12/1 but increased on admission.  INcluding elevated bilirubin, alk phos.  Itraconazole is on hold.  Direct bili added on.Will repeat LFT tomorrow     6.  FEN/Renal:   New FARZANA to 2.57.  Hold celebrex and acyclovir.  FENA=<0.1, UA with some casts? and UC is pending.     7.  Mood: Continue Paxil.     8. Derm:   - recent rash on torso,  resolved. Derm consuledt.  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   Reemerged 12/9, resume triamcinolone TID on belly, back   9. Dispo:  After discussion with Dr Mukherjee, looking for common cause. DDX:  Sarcoidosis? Stable but increased LAP, splenomegaly with possible calcified nodules, prolonged cytopenias, high sIL2, and lack of deterioration over 3 months of no Dx argue against infection and cancer, and suggest autoimmunity-highest on list is. sarcoid. Asked pulmonary to biopsy a mediastinal node, will check ACE=pending, VitD3 very elevated and T cell subset panel is pending,  pathology  reviewed his recent marrow for granulomas- subcortical so no good answer.Unable to get biopsy today, not safe in endoscopy and unable to get platelets over 23 after 2 bags. Will treat him with steroids today, discussed with Dr shi by Dr Mukherjee.  Plan on biopsy on Monday, Guille is on the schedule tentatively.      Funmi Ospina PA-C  [phone]

## 2019-12-11 NOTE — PLAN OF CARE
Tmax 102.7. Blood culture done. Patient is febrile since start of shift and continued to be febrile overnight despite giving Tylenol x3. MD notified. Cold packs applied and encouraged to drink fluids before midnight. Saturating 89% at room air when asleep. Started on O2 at 2L, saturating mid 90s. Still with frequent nonproductive cough with intermittent tachypnea. Denies SOB, chest pain or nausea. Sepsis alert triggered, lactic acid=1.6. Kept on NPO post midnight except meds. Noted to have increased redness of rash at the back. Up independently and voiding freely. No diarrhea noted. Still needs PRBC (already ordered) transfusion. 1 unit platelet given. 2nd unit platelet (already ordered) pending due to fever. For bronchoscopy today.  Problem: Adult Inpatient Plan of Care  Goal: Plan of Care Review  12/11/2019 0541 by Princess Emily Hoyt RN  Outcome: No Change     Problem: Adult Inpatient Plan of Care  Goal: Patient-Specific Goal (Individualization)  12/11/2019 0541 by Princess Emily Hoyt RN  Outcome: No Change     Problem: Adult Inpatient Plan of Care  Goal: Absence of Hospital-Acquired Illness or Injury  12/11/2019 0541 by Princess Emily Hoyt RN  Outcome: No Change     Problem: Adult Inpatient Plan of Care  Goal: Optimal Comfort and Wellbeing  12/11/2019 0541 by Princess Emily Hoyt RN  Outcome: No Change     Problem: Adult Inpatient Plan of Care  Goal: Readiness for Transition of Care  12/11/2019 0541 by Princess Emily Hoyt RN  Outcome: No Change     Problem: Adult Inpatient Plan of Care  Goal: Rounds/Family Conference  12/11/2019 0541 by Princess Emily Hoyt RN  Outcome: No Change     Problem: Electrolyte Imbalance (Acute Kidney Injury/Impairment)  Goal: Serum Electrolyte Balance  12/11/2019 0541 by Princess Emily Hoyt RN  Outcome: No Change     Problem: Hematologic Alteration (Acute Kidney Injury/Impairment)  Goal: Hemoglobin, Hematocrit and Platelets Within Normal Range  12/11/2019 0541 by  Princess Emily Hoyt, EULALIO  Outcome: No Change     Problem: Renal Function Impairment (Acute Kidney Injury/Impairment)  Goal: Effective Renal Function  12/11/2019 0541 by Princess Emily Hoyt RN  Outcome: No Change     Problem: Infection  Goal: Infection Symptom Resolution  12/11/2019 0541 by Princess Emily Hoyt, EULALIO  Outcome: No Change

## 2019-12-11 NOTE — PLAN OF CARE
Tmax 102.2ax.  O2 sats 88% so on 2L NC for awhile.  Currently on RA sating fine in mid 90s. PRBC x1.  Plts x1 for bronch but 1hr post only up to 24 so procedure cancelled.  Will go to OR for it on Monday.  MIVF decreased to 50ml/hr.  Prednisone started.  Rash continues.  Ate a little.  Drinking some.  Will call when ready to shower.    Problem: Adult Inpatient Plan of Care  Goal: Plan of Care Review  12/11/2019 1658 by Nadia Coats, RN  Outcome: No Change  Flowsheets (Taken 12/11/2019 1658)  Plan of Care Reviewed With: patient     Problem: Electrolyte Imbalance (Acute Kidney Injury/Impairment)  Goal: Serum Electrolyte Balance  12/11/2019 1658 by Nadia Coats, RN  Outcome: No Change     Problem: Hematologic Alteration (Acute Kidney Injury/Impairment)  Goal: Hemoglobin, Hematocrit and Platelets Within Normal Range  12/11/2019 1658 by Nadia Coats, RN  Outcome: No Change     Problem: Hematologic Alteration (Acute Kidney Injury/Impairment)  Goal: Hemoglobin, Hematocrit and Platelets Within Normal Range  Intervention: Monitor and Manage Signs of Anemia and Bleeding  Flowsheets (Taken 12/11/2019 1658)  Bleeding Precautions: blood pressure closely monitored; monitored for signs of bleeding  Bleeding Management: blood products administered     Problem: Renal Function Impairment (Acute Kidney Injury/Impairment)  Goal: Effective Renal Function  12/11/2019 1658 by Nadia Coats, RN  Outcome: No Change     Problem: Diarrhea  Goal: Fluid and Electrolyte Balance  12/11/2019 1658 by Nadia Coats, RN  Outcome: No Change     Problem: Diarrhea  Goal: Fluid and Electrolyte Balance  Intervention: Manage Diarrhea  Flowsheets (Taken 12/11/2019 1658)  Isolation Precautions: protective environment maintained     Problem: Bleeding Risk or Actual  Goal: Absence of Bleeding  12/11/2019 1658 by Nadia Coats, RN  Outcome: No Change     Problem: Bleeding Risk or Actual  Goal: Absence of Bleeding  Intervention: Minimize Bleeding  Risk  Flowsheets (Taken 12/11/2019 1658)  Bleeding Precautions: blood pressure closely monitored; monitored for signs of bleeding     Problem: Infection  Goal: Infection Symptom Resolution  12/11/2019 1658 by Nadia Coats RN  Outcome: Improving     Problem: Infection  Goal: Infection Symptom Resolution  Intervention: Prevent or Manage Infection  Flowsheets (Taken 12/11/2019 1658)  Fever Reduction/Comfort Measures: ice pack(s); medication administered  Infection Management: aseptic technique maintained  Isolation Precautions: protective environment maintained

## 2019-12-11 NOTE — PROGRESS NOTES
ATTENDING ADDENDUM:    I have independently seen and evaluated the patient on December 11, 2019 and reviewed clinical, laboratory, and radiographic findings. I have discussed the plan with the team and agree with the attached note with the following edits:    Angel Yanez is a 59 year old year old male, with MM s/p auto 7 months ago, and FUO for a few months, here for FARZANA. Febrile.      Ph/E: Vitals reviewed. No distress. Oropharynx clear. Neck supple. Heart RRR. Lungs clear. Abdomen soft. No peripheral edema. Non-confluent macular pink rash on chest. Neuro nonfocal.      A&P: Mediastinal LN Bx today if platelets permit; otherwise, start pred 30 mg daily. Top differential is sarcoid. 1,25 OH VitD very high, supporting the Dx. Options for long term are steroids vs. Plaquenil, hopefully plus velcade soon if platelet come up. Notified Dr. Lucio.       acetaminophen  975 mg Oral BID     calcium carbonate  1,200 mg Oral Daily     omeprazole  40 mg Oral BID AC     PARoxetine  20 mg Oral QAM     phytonadione  5 mg Oral Daily     triamcinolone   Topical TID     Vitamin D3  2,000 Units Oral Daily       Kody Mukherjee MD

## 2019-12-12 ENCOUNTER — CARE COORDINATION (OUTPATIENT)
Dept: ONCOLOGY | Facility: CLINIC | Age: 59
End: 2019-12-12

## 2019-12-12 ENCOUNTER — TELEPHONE (OUTPATIENT)
Dept: PULMONOLOGY | Facility: CLINIC | Age: 59
End: 2019-12-12

## 2019-12-12 VITALS
SYSTOLIC BLOOD PRESSURE: 153 MMHG | HEART RATE: 80 BPM | WEIGHT: 179.5 LBS | OXYGEN SATURATION: 94 % | RESPIRATION RATE: 16 BRPM | DIASTOLIC BLOOD PRESSURE: 91 MMHG | TEMPERATURE: 98.4 F | BODY MASS INDEX: 25.18 KG/M2

## 2019-12-12 LAB
ABO + RH BLD: NORMAL
ABO + RH BLD: NORMAL
ACE SERPL-CCNC: 27 U/L (ref 9–67)
ALBUMIN SERPL-MCNC: 2.1 G/DL (ref 3.4–5)
ALP SERPL-CCNC: 345 U/L (ref 40–150)
ALT SERPL W P-5'-P-CCNC: 12 U/L (ref 0–70)
ANION GAP SERPL CALCULATED.3IONS-SCNC: 7 MMOL/L (ref 3–14)
ANISOCYTOSIS BLD QL SMEAR: ABNORMAL
AST SERPL W P-5'-P-CCNC: 7 U/L (ref 0–45)
BASOPHILS # BLD AUTO: 0 10E9/L (ref 0–0.2)
BASOPHILS NFR BLD AUTO: 0 %
BILIRUB DIRECT SERPL-MCNC: 0.6 MG/DL (ref 0–0.2)
BILIRUB SERPL-MCNC: 1.4 MG/DL (ref 0.2–1.3)
BLD GP AB SCN SERPL QL: NORMAL
BLD PROD TYP BPU: NORMAL
BLD UNIT ID BPU: 0
BLD UNIT ID BPU: 0
BLOOD BANK CMNT PATIENT-IMP: NORMAL
BLOOD PRODUCT CODE: NORMAL
BLOOD PRODUCT CODE: NORMAL
BPU ID: NORMAL
BPU ID: NORMAL
BUN SERPL-MCNC: 42 MG/DL (ref 7–30)
CALCIUM SERPL-MCNC: 8.6 MG/DL (ref 8.5–10.1)
CHLORIDE SERPL-SCNC: 103 MMOL/L (ref 94–109)
CO2 SERPL-SCNC: 26 MMOL/L (ref 20–32)
CREAT SERPL-MCNC: 1 MG/DL (ref 0.66–1.25)
DIFFERENTIAL METHOD BLD: ABNORMAL
EOSINOPHIL # BLD AUTO: 0.1 10E9/L (ref 0–0.7)
EOSINOPHIL NFR BLD AUTO: 6 %
ERYTHROCYTE [DISTWIDTH] IN BLOOD BY AUTOMATED COUNT: 21 % (ref 10–15)
GFR SERPL CREATININE-BSD FRML MDRD: 81 ML/MIN/{1.73_M2}
GLUCOSE SERPL-MCNC: 121 MG/DL (ref 70–99)
HCT VFR BLD AUTO: 23.9 % (ref 40–53)
HGB BLD-MCNC: 8.2 G/DL (ref 13.3–17.7)
LYMPHOCYTES # BLD AUTO: 0.5 10E9/L (ref 0.8–5.3)
LYMPHOCYTES NFR BLD AUTO: 29 %
MCH RBC QN AUTO: 31.9 PG (ref 26.5–33)
MCHC RBC AUTO-ENTMCNC: 34.3 G/DL (ref 31.5–36.5)
MCV RBC AUTO: 93 FL (ref 78–100)
MONOCYTES # BLD AUTO: 0.1 10E9/L (ref 0–1.3)
MONOCYTES NFR BLD AUTO: 9 %
NEUTROPHILS # BLD AUTO: 0.9 10E9/L (ref 1.6–8.3)
NEUTROPHILS NFR BLD AUTO: 56 %
NUM BPU REQUESTED: 1
NUM BPU REQUESTED: 1
NUM BPU REQUESTED: 3
PLATELET # BLD AUTO: 17 10E9/L (ref 150–450)
POIKILOCYTOSIS BLD QL SMEAR: SLIGHT
POTASSIUM SERPL-SCNC: 3.7 MMOL/L (ref 3.4–5.3)
PROT SERPL-MCNC: 5.1 G/DL (ref 6.8–8.8)
RBC # BLD AUTO: 2.57 10E12/L (ref 4.4–5.9)
SODIUM SERPL-SCNC: 135 MMOL/L (ref 133–144)
SPECIMEN EXP DATE BLD: NORMAL
TRANSFUSION STATUS PATIENT QL: NORMAL
WBC # BLD AUTO: 1.6 10E9/L (ref 4–11)

## 2019-12-12 PROCEDURE — 86900 BLOOD TYPING SEROLOGIC ABO: CPT | Performed by: PHYSICIAN ASSISTANT

## 2019-12-12 PROCEDURE — 80048 BASIC METABOLIC PNL TOTAL CA: CPT | Performed by: INTERNAL MEDICINE

## 2019-12-12 PROCEDURE — 25000131 ZZH RX MED GY IP 250 OP 636 PS 637: Performed by: PHYSICIAN ASSISTANT

## 2019-12-12 PROCEDURE — 80076 HEPATIC FUNCTION PANEL: CPT | Performed by: INTERNAL MEDICINE

## 2019-12-12 PROCEDURE — P9073 PLATELETS PHERESIS PATH REDU: HCPCS | Performed by: PHYSICIAN ASSISTANT

## 2019-12-12 PROCEDURE — 25000132 ZZH RX MED GY IP 250 OP 250 PS 637: Performed by: INTERNAL MEDICINE

## 2019-12-12 PROCEDURE — 36415 COLL VENOUS BLD VENIPUNCTURE: CPT | Performed by: INTERNAL MEDICINE

## 2019-12-12 PROCEDURE — 85025 COMPLETE CBC W/AUTO DIFF WBC: CPT | Performed by: INTERNAL MEDICINE

## 2019-12-12 PROCEDURE — 86923 COMPATIBILITY TEST ELECTRIC: CPT | Performed by: PHYSICIAN ASSISTANT

## 2019-12-12 PROCEDURE — 86901 BLOOD TYPING SEROLOGIC RH(D): CPT | Performed by: PHYSICIAN ASSISTANT

## 2019-12-12 PROCEDURE — 25000132 ZZH RX MED GY IP 250 OP 250 PS 637: Performed by: PHYSICIAN ASSISTANT

## 2019-12-12 PROCEDURE — 25000132 ZZH RX MED GY IP 250 OP 250 PS 637: Performed by: STUDENT IN AN ORGANIZED HEALTH CARE EDUCATION/TRAINING PROGRAM

## 2019-12-12 PROCEDURE — P9040 RBC LEUKOREDUCED IRRADIATED: HCPCS | Performed by: PHYSICIAN ASSISTANT

## 2019-12-12 PROCEDURE — 86850 RBC ANTIBODY SCREEN: CPT | Performed by: PHYSICIAN ASSISTANT

## 2019-12-12 PROCEDURE — 25000125 ZZHC RX 250: Performed by: PHYSICIAN ASSISTANT

## 2019-12-12 PROCEDURE — 25000128 H RX IP 250 OP 636: Performed by: PHYSICIAN ASSISTANT

## 2019-12-12 RX ORDER — FLUCONAZOLE 200 MG/1
200 TABLET ORAL DAILY
Qty: 30 TABLET | Refills: 1 | Status: SHIPPED | OUTPATIENT
Start: 2019-12-12 | End: 2020-01-07

## 2019-12-12 RX ORDER — ONDANSETRON 8 MG/1
8 TABLET, ORALLY DISINTEGRATING ORAL EVERY 8 HOURS PRN
Qty: 10 TABLET | Refills: 0 | Status: SHIPPED | OUTPATIENT
Start: 2019-12-12 | End: 2019-12-12

## 2019-12-12 RX ORDER — PREDNISONE 10 MG/1
30 TABLET ORAL DAILY
Qty: 90 TABLET | Refills: 0 | Status: SHIPPED | OUTPATIENT
Start: 2019-12-12 | End: 2020-01-07

## 2019-12-12 RX ORDER — ACYCLOVIR 800 MG/1
800 TABLET ORAL 2 TIMES DAILY
Qty: 60 TABLET | Refills: 1 | Status: SHIPPED | OUTPATIENT
Start: 2019-12-12 | End: 2020-02-04

## 2019-12-12 RX ORDER — ACETAMINOPHEN 325 MG/1
325-650 TABLET ORAL EVERY 4 HOURS PRN
COMMUNITY
Start: 2019-12-12

## 2019-12-12 RX ADMIN — PREDNISONE 30 MG: 20 TABLET ORAL at 07:59

## 2019-12-12 RX ADMIN — MELATONIN 2000 UNITS: at 07:59

## 2019-12-12 RX ADMIN — Medication 1200 MG: at 07:59

## 2019-12-12 RX ADMIN — PHYTONADIONE 5 MG: 10 INJECTION, EMULSION INTRAMUSCULAR; INTRAVENOUS; SUBCUTANEOUS at 08:03

## 2019-12-12 RX ADMIN — PAROXETINE HYDROCHLORIDE HEMIHYDRATE 20 MG: 20 TABLET, FILM COATED ORAL at 07:59

## 2019-12-12 RX ADMIN — OMEPRAZOLE 40 MG: 20 CAPSULE, DELAYED RELEASE ORAL at 07:59

## 2019-12-12 RX ADMIN — FILGRASTIM 480 MCG: 480 INJECTION, SOLUTION INTRAVENOUS; SUBCUTANEOUS at 11:49

## 2019-12-12 ASSESSMENT — ACTIVITIES OF DAILY LIVING (ADL)
ADLS_ACUITY_SCORE: 11

## 2019-12-12 NOTE — PROGRESS NOTES
Discharge SBAR:    Situation:  Angel Yanez is a 59 year old being discharged to: Home  Admission reason: Fever and Shortness of Breath  Is this a readmission? Yes    Background:  Primary diagnosis: MM  BP (!) 153/91 (BP Location: Left arm)   Pulse 80   Temp 98.4  F (36.9  C) (Oral)   Resp 16   Wt 81.4 kg (179 lb 8 oz)   SpO2 94%   BMI 25.18 kg/m    Type of donor: Autologous  Type of stem cells: PBSC  Relapsed? No  Falls Precautions? No  Isolation? No  DNR? No  DNI? No  Confidential Patient? No  Positive blood cultures? No    Assessment:  Discharge teaching    Review discharge medications and schedule: Yes    Set up pill box: No    Discharge instructions reviewed: Yes    Special considerations (think about previous reactions, issues with flushing CVC, premedication needs, etc): No    Patient Concerns: No    Recommendations:  Anticipated needs:    Daily infusions: No    Daily transfusions: Yes: probably plts    G-CSF: Yes PRN    Other: Not Applicable  Verbal report called to clinic: No

## 2019-12-12 NOTE — PROGRESS NOTES
BMT Daily Progress Note   12/12/2019    Patient ID:  Angel Yanez is a 58 yo man D+209 s/p 2nd autologous transplant for MM.   S/p 2 readmissions for FUO (10/16-10/23; 9/23-9/29/19). Readmitted on 12/9/2019 with return of fevers despite celebrex, FARZANA, worsening sob and return of rash       Diagnosis MM Multiple myeloma  HCT Type Autologous    Prep Regimen Cytoxan  Melphalan   Donor Source No data was found    GVHD Prophylaxis No  Primary BMT Provider Kavonlorenza Ortiz was admitted on 12/9/2019 for FARZANA and increased sob with chronic cough.    INTERVAL  HISTORY   Fevers have resolved after starting prednisone.  Last fever yesterday at around 09:00 am.  Cough and sob is stable.  Diarrhea has resolved.  Denies nausea.  Rash is stable to better, some itching today.  No bleeding.  Wants to know when can go home.    Review of Systems: 10 point ROS negative except as noted above.      PHYSICAL EXAM     Weight In/Out     Wt Readings from Last 3 Encounters:   12/11/19 81.4 kg (179 lb 8 oz)   12/09/19 78.3 kg (172 lb 11.2 oz)   12/06/19 76.8 kg (169 lb 4.8 oz)      I/O last 3 completed shifts:  In: 1477 [P.O.:700; I.V.:200]  Out: 1500 [Urine:1500]         BP (!) 153/91 (BP Location: Left arm)   Pulse 80   Temp 98.4  F (36.9  C) (Oral)   Resp 16   Wt 81.4 kg (179 lb 8 oz)   SpO2 94%   BMI 25.18 kg/m         General: NAD   Eyes: : FARRAH, sclera anicteric   Nose/Mouth/Throat: OP clear, buccal mucosa moist, no ulcerations   Lungs:  Some crackles on left base, right decreased bs and some crackles  Cardiovascular: RRR, no M/R/G   Abdominal/Rectal: +BS, soft, NT, ND, No HSM   Lymphatics: no edema  Skin: no  [place], Non-confluent macular pink rash on chest and back, few on upper thighs  Neuro: A&O   Additional Findings: Peripheral IV      LABS AND IMAGING - PAST 24 HOURS     Results for orders placed or performed during the hospital encounter of 12/09/19 (from the past 24 hour(s))   Basic metabolic panel   Result Value  Ref Range    Sodium 135 133 - 144 mmol/L    Potassium 3.7 3.4 - 5.3 mmol/L    Chloride 103 94 - 109 mmol/L    Carbon Dioxide 26 20 - 32 mmol/L    Anion Gap 7 3 - 14 mmol/L    Glucose 121 (H) 70 - 99 mg/dL    Urea Nitrogen 42 (H) 7 - 30 mg/dL    Creatinine 1.00 0.66 - 1.25 mg/dL    GFR Estimate 81 >60 mL/min/[1.73_m2]    GFR Estimate If Black >90 >60 mL/min/[1.73_m2]    Calcium 8.6 8.5 - 10.1 mg/dL   CBC with platelets differential   Result Value Ref Range    WBC 1.6 (L) 4.0 - 11.0 10e9/L    RBC Count 2.57 (L) 4.4 - 5.9 10e12/L    Hemoglobin 8.2 (L) 13.3 - 17.7 g/dL    Hematocrit 23.9 (L) 40.0 - 53.0 %    MCV 93 78 - 100 fl    MCH 31.9 26.5 - 33.0 pg    MCHC 34.3 31.5 - 36.5 g/dL    RDW 21.0 (H) 10.0 - 15.0 %    Platelet Count 17 (LL) 150 - 450 10e9/L    Diff Method Manual Differential     % Neutrophils 56.0 %    % Lymphocytes 29.0 %    % Monocytes 9.0 %    % Eosinophils 6.0 %    % Basophils 0.0 %    Absolute Neutrophil 0.9 (L) 1.6 - 8.3 10e9/L    Absolute Lymphocytes 0.5 (L) 0.8 - 5.3 10e9/L    Absolute Monocytes 0.1 0.0 - 1.3 10e9/L    Absolute Eosinophils 0.1 0.0 - 0.7 10e9/L    Absolute Basophils 0.0 0.0 - 0.2 10e9/L    Anisocytosis Moderate     Poikilocytosis Slight    Hepatic panel   Result Value Ref Range    Bilirubin Direct 0.6 (H) 0.0 - 0.2 mg/dL    Bilirubin Total 1.4 (H) 0.2 - 1.3 mg/dL    Albumin 2.1 (L) 3.4 - 5.0 g/dL    Protein Total 5.1 (L) 6.8 - 8.8 g/dL    Alkaline Phosphatase 345 (H) 40 - 150 U/L    ALT 12 0 - 70 U/L    AST 7 0 - 45 U/L   Platelets prepare order unit conditional   Result Value Ref Range    Blood Component Type PLT Pheresis     Units Ordered 1    Blood component   Result Value Ref Range    Unit Number [id number]     Blood Component Type [id number]-2     Division Number 00     Status of Unit Released to care unit 12/12/2019 0534     Blood Product Code [id number]     Unit Status ISS    ABO/Rh type and screen   Result Value Ref Range    Units Ordered 1     ABO O     RH(D) Pos      Antibody Screen Neg     Test Valid Only At          [facility]    Specimen Expires 12/15/2019     Crossmatch Red Blood Cells    Blood component   Result Value Ref Range    Unit Number [id number]     Blood Component Type Red Blood Cells LeukoReduced Irradiated     Division Number 00     Status of Unit Released to care unit 12/12/2019 1117     Blood Product Code [id number]     Unit Status ISS          OVERALL PLAN   Angel Yanez is a 60 yo man D+209 s/p 2nd autologous transplant for MM.   S/p 2 readmissions for FUO (10/16-10/23; 9/23-9/29/19).      1.  BMT/MM:   Slow engraftment; cell dose was only 0.639x10^6. ([place] - known poor cell dose as failed chemo-mobilization 1/2019.)   - day +180 bone marrow biopsy 11/6/19 which showed no morphologic or immunophenotypic evidence of plasma cell neoplasm. His light chains were not elevated and he had no monoclonal protein in the serum.  He is in clinical remission.  - PET in 8/2019 clear.   - PB FLOW 11/23: No overt aberrant immunophenotype on T cells.  Rare to absent mature B cells and plasma cells.      2.  HEME: Keep Hgb>8g/dL, plt >10 .  -Remains transfusions dependent.Received RBC on 12/11/2019 and will give another 1 unit of RBC 12/12 before discharge.  Received 2 units of platelets on 12/11/2019 and bumped from 23 to 24.  Had planned on doing any EBUS of mediastinal LN on 12/11/2019 but could not get platelets high enough( 50,000) to do in endoscopy.  Platelets on discharge=17.  Schedule tomorrow for possible plts before the weekend.    Last GCSF on 12/1/2019, WBC trending down.  Give for ANC 1000 or less. 12/12 ANC=0.9, will give G-CSF prior to discharge.  -Pancytopenia post BMT, low cell dose, +/- infection.     - No evidence of HLH on lymphnode bx 10/18/19.     - Persistent thrombocytopenia: BM 11/6/19 showed adequate granulopoiesis and erythropoiesis but his platelet precursors were 0 to absent which is  reflected in peripheral CBC. Started promactic 50mcg daily on 11/10. Has been titrated up to 150mg daily (started 11/23); no change. Promacta stopped 12/6/2019 as not helping.  ( Guille actually several days before as he ran out of this)  - Hemolysis eval neg on 11/10/2019     3.  ID: Fevers to 104 on admit despite celebrex.  No evidence of infection. NOw no fevers since 08:45 on 12/11/2019- started prednisone as below.   -Stopped celebrex 12/9 due to FARZANA. Scheduled tylenol BID on admit, stopped scheduled tylenol on discharge.  With SOB, obtained 12/9 RVP=neg  Blood cx on 12/9 and 12/10 are neg to date  12/9 CT chest is stable with GGO better. No antibiotics given during admission because do not feel like fevers are infection.  Hx FUO  A.) Extensive ID work up unremarkable  sept 2019 and oct and again Nov. 10/16 and 11/9 repeat chest CT: persistent mediastinal adenopathy no paranchymal disease. S/P bx of L axillary node on 10/18: No evidence of HLH, tissues looks reactive.   - s/p excisional R axillary lymph node bx 11/15/19 (neg core bx 10/18 L axillary). Bx sent to [facility] 11/26 -Called surg path and they faxed copy of LN biopsy consult from [facility]- reviewed report with Dr Mukherjee 12/10.  No definitive diagnosis.  - HTLV neg with very low lymphocyte count. CD4 count 146.  - Per ID with negative w/u and not neutropenic discontinued cefepime 11/12. 11/15 CT Chest new GGO.  RVP neg. Pulm consulted. No indication for BAL or further treatment/testing. Completed a zpak 11/21.  Saw ID outpatient on 12/3/2019: see note.  - Per ID, possible histoplasmosis: empiric itraconazole 200 TID started  with fevers initially resolved but then recurred; admitted on bid dosing. Stopped itraconazole with elevated LFT and recurrence of fever. 10/18 Histo/blasto urine antigen negative. Per Dr Kelley flucaonzole can suppress growth? 10/18 [place] DNA test- negative. 1/12 itra level=1.6.    Right LNTissue culture (11/15): positive for  stenotrophomonas malophilia and Ochrobactrum anthropi from broth only. Sensitive to bactrim and levaquin. Given culture + in broth only, this is likely contaminant but given FUO treated empirically. Was on  Levaquin 750mg daily 11/26, stopped 12/6 as no impact to fever trends.   - As per ID's note 12/3 and after discussion with Dr Lucio, Stopped itraconazole on 12/9 as no impact to fever trend (could also be potentiation FARZANA offending medications)  B.) Autoimmune workup so far negative. Rheum consult, last note on 11/14: felt no evidence of autoimmune disease flair neg studies above but recommended evaluate possible granulomatous finding with lymph nose biopsy to r/o sarcoidosis in the setting of FUO and elevated soluble IL-2 RA--  C.) Malignancy W/U: Neg BM BX 11/6. Neg PET in 8/2019. Neg core lymph node needle bx 10/18. PSA neg. Admit CT with R kidney hypoenhancing ill-defined focus. MRI 11/13 not suggestive of urothelial or renal cell carcinoma.   Prophy: Held acyclovir due to FARZANA on admission but restarted and discharged on acyclovir.  Remains on pentamidine (11/19). See repeat t cell subset, Absolute QQ6=745.  Started fluconazole with prednisone but Dr Mukherjee did not feel like levaquin was warranted.    - influenza vaccination given 11/26/19          4. Pulm:  Sob again with exertion over  Weekend prior to admission.  Still with chronic, non productive cough.  Repeat CT on 12/9/19 with improved GGO and stable nodularity and pleural effusion smaller.  Boarder lines sats, decreased to 89% with coughingg.  Walked with O2 sat monitor in clinic without drop.Sating 96% on RA on discharge.  -no antibiotics with stable to improved CT.  RVP from 12/9=neg.  - 10/17: Echo with preserved EF, no evidence of right heart strain. Repeat echo 11/14 stable.  - 10/17 VQ scan neg for PE     5. GI: Nausea with vomiting this weekend. 12/9: New diarrhea, belly cramping x~2 days  12/9 C diff/ enteric =neg.  Adeno=neg.  Imodium  prn.Diarrhea resolved on discharge.  - LFTs wnl 12/1 but increased on admission.  INcluding elevated bilirubin, alk phos.  Itraconazole is on hold.  Direct bili added on, slt elevated.  repeat  Tbili=1.4 and alk phos slt increased at 345     6.  FEN/Renal:   New FARZANA to 2.57.  Hold celebrex and acyclovir.  FENA=<0.1, UA with some casts? and UC is neg. Creat down to 1.00 on discharge.  Likely celebrex.  Will leave off celebrex with prednisone now on board.     7.  Mood: Continue Paxil.     8. Derm:   - recent rash on torso that has returned.Guille says this is the same rash. Derm consuled. 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   Reemerged 12/9, resumed triamcinolone TID on belly, back   9. Dispo:  After discussion with Dr Mukhejree, looking for common cause.Some kind of auto immune process DDX:  Sarcoidosis? Stable but increased LAP, splenomegaly with possible calcified nodules, prolonged cytopenias, high sIL2, and lack of deterioration over 3 months of no Dx argue against infection and cancer, and suggest autoimmunity-highest on list is. sarcoid. Asked pulmonary to biopsy a mediastinal node, will check ACE=which is in the normal range, VitD3 very elevated and T cell subset panel does not show definative inversion of ratio.  pathology  reviewed his recent marrow for granulomas- subcortical so no good answer.Unable to get biopsy 12/11, not safe in endoscopy and unable to get platelets over 23 after 2 bags. Started treating  him with steroids today,prednisone 30 mg daily discussed with Dr shi by Dr Mukherjee.   - Plan on biopsy on Monday, Guille is on the schedule.  Plan per interventional pulmonology is to take Mr. Yanez for bronchoscopy with EBUS as an outpatient on Monday 12/16/19 at 12:40 PM (needs to arrive two hours in advance, NPO at midnight).  Will plan on platelet transfusion Monday morning. May need DDAVP in pre-op  for BUN. As this will be with GA in a more controlled setting, platelet goal can be liberated to >25K from 50K.   Per Epic message by staff to have pulmonary folks help us arrange this: Dr Gray: from IR pulmonary: We will need to coordinate platelet transfusion. We should do 2U preop (ok to have 2nd one hanging on transport to OR room) with 3rd Unit hold. He should not get this transfused in the infusion center but in preop area. He also needs DDAVP ordered as well.    Discharge today, afebrile, still with cough, stable sob, o2 sats 96% on room air.Give RBC before discharge. Says he has robitussin with codeine and tessalon perles at home  Follow up in clinic tomorrow for likely platelets.  Next available with Dr Lucio.  Funmi Ospina PA-C  [phone]

## 2019-12-12 NOTE — PROGRESS NOTES
ATTENDING ADDENDUM:    I have independently seen and evaluated the patient on December 12, 2019 and reviewed clinical, laboratory, and radiographic findings. I have discussed the plan with the team and agree with the attached note with the following edits:    Angel Yanez is a 59 year old year old male, with MM s/p auto 7 months ago, and FUO for a few months, here for FARZANA. Resolved, and fever gone after pred, feels better, just cough      Ph/E: Vitals reviewed. No distress. Oropharynx clear. Neck supple. Heart RRR. Lungs clear. Abdomen soft. No peripheral edema. Non-confluent macular pink rash on chest. Neuro nonfocal.      A&P: Mediastinal LN Bx Monday. Continue pred for now for possible Dx of sarcoid. Options for long term are steroids vs. Plaquenil, hopefully plus velcade soon if platelet come up.       calcium carbonate  1,200 mg Oral Daily     omeprazole  40 mg Oral BID AC     PARoxetine  20 mg Oral QAM     predniSONE  30 mg Oral Daily     triamcinolone   Topical TID     Vitamin D3  2,000 Units Oral Daily       Kody Mukherjee MD

## 2019-12-12 NOTE — TELEPHONE ENCOUNTER
Patient was scheduled on 12/16 at [facility] OR per in-basket message.   Patient was not called due to In-patient status

## 2019-12-12 NOTE — INTERIM SUMMARY
Guille Yanez   Home Medication Instructions [id number]    Printed on:12/12/19 3789   Medication Information                      acetaminophen (TYLENOL) 325 MG tablet  Take 1-2 tablets (325-650 mg) by mouth every 4 hours as needed for fever             acyclovir (ZOVIRAX) 800 MG tablet  Take 1 tablet (800 mg) by mouth 2 times daily  To prevent viral infection           calcium carbonate (CALCIUM CARBONATE) 600 MG tablet  Take 2 tablets by mouth daily              Cholecalciferol (VITAMIN D3) 2000 units CAPS  Take 2,000 Units by mouth daily              fluconazole (DIFLUCAN) 200 MG tablet  Take 1 tablet (200 mg) by mouth daily  To prevent yeast infection                        PARoxetine (PAXIL) 20 MG tablet  Take 1 tablet (20 mg) by mouth every morning             pentamidine (NEBUPENT) 300 MG neb solution  Inhale 300 mg into the lungs every 28 days Will get in BMT clinic. Next due Monday 12/17/19  To prevent Pneumocytstis           predniSONE (DELTASONE) 10 MG tablet  Take 3 tablets (30 mg) by mouth daily  For possible autoimmune process           triamcinolone (KENALOG) 0.1 % external cream  Apply topically 2 times daily for Jacob's rash               Medication changes:  1. Stop celebrex, concern that this NSAID caused kidney issue  2. Stop Promacta as platelets have not improved  3. Stop Itraconazole, per infectious disease on 12/3/2019, did not improve fevers  4. No Bactrim due to kidney issue and low counts, now getting pentamidine

## 2019-12-12 NOTE — DISCHARGE SUMMARY
[facility] Discharge Summary   Angel Yanez MRN# [id number]   Age: 59 year old  YOB: 1960   Date of Admission: 12/9/2019  Date of Discharge:    Admitting Physician: Kody Mukherjee MD  Discharge Physician:    Discharge Diagnoses:    1.) Multiple Myeloma Day +209  s/p second Auto PBSCT  2.) Fever of Unknown Origin  3.) Pancytopenia secondary to low stem cell dose  4) diarrhea, culture negative  5) FARZANA likely from medication, resolved  6)Chronic cough with stable to improved CT  Discharge Medications:       Guille Yanez   Home Medication Instructions [id number]    Printed on:12/12/19 6926   Medication Information                      acetaminophen (TYLENOL) 325 MG tablet  Take 1-2 tablets (325-650 mg) by mouth every 4 hours as needed for fever             acyclovir (ZOVIRAX) 800 MG tablet  Take 1 tablet (800 mg) by mouth 2 times daily             calcium carbonate (CALCIUM CARBONATE) 600 MG tablet  Take 2 tablets by mouth daily              Cholecalciferol (VITAMIN D3) 2000 units CAPS  Take 2,000 Units by mouth daily              fluconazole (DIFLUCAN) 200 MG tablet  Take 1 tablet (200 mg) by mouth daily             PARoxetine (PAXIL) 20 MG tablet  Take 1 tablet (20 mg) by mouth every morning             pentamidine (NEBUPENT) 300 MG neb solution  Inhale 300 mg into the lungs every 28 days Will get in BMT clinic. Next due Monday 12/17/19             predniSONE (DELTASONE) 10 MG tablet  Take 3 tablets (30 mg) by mouth daily             triamcinolone (KENALOG) 0.1 % external cream  Apply topically 2 times daily               Brief History of Illness:    **Adopted from H&P  Patient presented to clinic today with a chief complaint of shortness of breath. Has been notably worse over the last few days. Of note his weekend was demarcated by new onset nausea with vomiting x1 12/7, belly cramping since and new diarrhea x1 today. He denies any issues with urination, no blood in urine, no difficulty  starting a stream. Fevers were up to 104 this weekend which is slightly higher than usual but these fevers have been worked up thoroughly without yield to date. He denies any change to his chronic cough, no sputum production or hemoptysis. He endorses some mild dizziness and slowness to respond with speech and decreased attentiveness. The rash on his belly, previous diagnosed as [place]'s appears to have returned on his belly and back.   In clinic he received 1 unit RBCs without significant improvement to his SOB. A IDANIA was done with results below. He used 2L Oxygen for comfort but has no desats despite walking O2 monitoring.   Physical Exam:    BP (!) 153/91 (BP Location: Left arm)   Pulse 80   Temp 98.4  F (36.9  C) (Oral)   Resp 16   Wt 81.4 kg (179 lb 8 oz)   SpO2 94%   BMI 25.18 kg/m        General: NAD   Eyes: : FARRAH, sclera anicteric   Nose/Mouth/Throat: OP clear, buccal mucosa moist, no ulcerations   Lungs:  Some crackles on left base, right decreased bs and some crackles  Cardiovascular: RRR, no M/R/G   Abdominal/Rectal: +BS, soft, NT, ND, No HSM   Lymphatics: no edema  Skin: no  [place], Non-confluent macular pink rash on chest and back, few on upper thighs  Neuro: A&O   Additional Findings: Peripheral IV     Hospital Course:      CODE STATUS:   Discharge Instructions and Follow-Up:    Discharge diet: Regular diet as tolerated  Discharge activity: Activity as tolerated   Discharge follow-up: Follow up with BMT Clinic as follows:  Angel Yanez is a 60 yo man D+209 s/p 2nd autologous transplant for MM.   S/p 2 readmissions for FUO (10/16-10/23; 9/23-9/29/19).      1.  BMT/MM:   Slow engraftment; cell dose was only 0.639x10^6. (Marrow [place] - known poor cell dose as failed chemo-mobilization 1/2019.)   - day +180 bone marrow biopsy 11/6/19 which showed no morphologic or immunophenotypic evidence of plasma cell neoplasm. His light chains were not elevated and he had no monoclonal protein in the  serum.  He is in clinical remission.  - PET in 8/2019 clear.   - PB FLOW 11/23: No overt aberrant immunophenotype on T cells.  Rare to absent mature B cells and plasma cells.      2.  HEME: Keep Hgb>8g/dL, plt >10 .  -Remains transfusions dependent.Received RBC on 12/11/2019 and will give another 1 unit of RBC 12/12 before discharge.  Received 2 units of platelets on 12/11/2019 and bumped from 23 to 24.  Had planned on doing any EBUS of mediastinal LN on 12/11/2019 but could not get platelets high enough( 50,000) to do in endoscopy.  Platelets on discharge=17.  Schedule tomorrow for possible plts before the weekend.     Last GCSF on 12/1/2019, WBC trending down.  Give for ANC 1000 or less. 12/12 ANC=0.9, will give G-CSF prior to discharge.  -Pancytopenia post BMT, low cell dose, +/- infection.      - No evidence of HLH on lymphnode bx 10/18/19.     - Persistent thrombocytopenia: BM 11/6/19 showed adequate granulopoiesis and erythropoiesis but his platelet precursors were 0 to absent which is reflected in peripheral CBC. Started promactic 50mcg daily on 11/10. Has been titrated up to 150mg daily (started 11/23); no change. Promacta stopped 12/6/2019 as not helping.  ( Guille actually several days before as he ran out of this)  - Hemolysis eval neg on 11/10/2019     3.  ID: Fevers to 104 on admit despite celebrex.  No evidence of infection. NOw no fevers since 08:45 on 12/11/2019- started prednisone as below.   -Stopped celebrex 12/9 due to FARZANA. Scheduled tylenol BID on admit, stopped scheduled tylenol on discharge.  With SOB, obtained 12/9 RVP=neg  Blood cx on 12/9 and 12/10 are neg to date  12/9 CT chest is stable with GGO better. No antibiotics given during admission because do not feel like fevers are infection.  Hx FUO  A.) Extensive ID work up unremarkable  sept 2019 and oct and again Nov. 10/16 and 11/9 repeat chest CT: persistent mediastinal adenopathy no paranchymal disease. S/P bx of L axillary node on 10/18:  No evidence of HLH, tissues looks reactive.   - s/p excisional R axillary lymph node bx 11/15/19 (neg core bx 10/18 L axillary). Bx sent to [facility] 11/26 -Called surg path and they faxed copy of LN biopsy consult from [facility]- reviewed report with Dr Mukherjee 12/10.  No definitive diagnosis.  - HTLV neg with very low lymphocyte count. CD4 count 146.  - Per ID with negative w/u and not neutropenic discontinued cefepime 11/12. 11/15 CT Chest new GGO.  RVP neg. Pulm consulted. No indication for BAL or further treatment/testing. Completed a zpak 11/21.  Saw ID outpatient on 12/3/2019: see note.  - Per ID, possible histoplasmosis: empiric itraconazole 200 TID started  with fevers initially resolved but then recurred; admitted on bid dosing. Stopped itraconazole with elevated LFT and recurrence of fever. 10/18 Histo/blasto urine antigen negative. Per Dr Kelley flucaonzole can suppress growth? 10/18 [place] DNA test- negative. 1/12 itra level=1.6.    Right LNTissue culture (11/15): positive for stenotrophomonas malophilia and Ochrobactrum anthropi from broth only. Sensitive to bactrim and levaquin. Given culture + in broth only, this is likely contaminant but given FUO treated empirically. Was on  Levaquin 750mg daily 11/26, stopped 12/6 as no impact to fever trends.   - As per ID's note 12/3 and after discussion with Dr Lucio, Stopped itraconazole on 12/9 as no impact to fever trend (could also be potentiation FARZANA offending medications)  B.) Autoimmune workup so far negative. Rheum consult, last note on 11/14: felt no evidence of autoimmune disease flair neg studies above but recommended evaluate possible granulomatous finding with lymph nose biopsy to r/o sarcoidosis in the setting of FUO and elevated soluble IL-2 RA--  C.) Malignancy W/U: Neg BM BX 11/6. Neg PET in 8/2019. Neg core lymph node needle bx 10/18. PSA neg. Admit CT with R kidney hypoenhancing ill-defined focus. MRI 11/13 not suggestive of urothelial or renal cell  carcinoma.   Prophy: Held acyclovir due to FARZANA on admission but restarted and discharged on acyclovir.  Remains on pentamidine (11/19). See repeat t cell subset, Absolute AU5=949.  Started fluconazole with prednisone but Dr Mukherjee did not feel like levaquin was warranted.    - influenza vaccination given 11/26/19          4. Pulm:  Sob again with exertion over  Weekend prior to admission.  Still with chronic, non productive cough.  Repeat CT on 12/9/19 with improved GGO and stable nodularity and pleural effusion smaller.  Boarder lines sats, decreased to 89% with coughingg.  Walked with O2 sat monitor in clinic without drop.Sating 96% on RA on discharge.  -no antibiotics with stable to improved CT.  RVP from 12/9=neg.  - 10/17: Echo with preserved EF, no evidence of right heart strain. Repeat echo 11/14 stable.  - 10/17 VQ scan neg for PE     5. GI: Nausea with vomiting this weekend. 12/9: New diarrhea, belly cramping x~2 days  12/9 C diff/ enteric =neg.  Adeno=neg.  Imodium prn.Diarrhea resolved on discharge.  - LFTs wnl 12/1 but increased on admission.  INcluding elevated bilirubin, alk phos.  Itraconazole is on hold.  Direct bili added on, slt elevated.  repeat  Tbili=1.4 and alk phos slt increased at 345     6.  FEN/Renal:   New FARZANA to 2.57.  Hold celebrex and acyclovir.  FENA=<0.1, UA with some casts? and UC is neg. Creat down to 1.00 on discharge.  Likely celebrex.  Will leave off celebrex with prednisone now on board.     7.  Mood: Continue Paxil.     8. Derm:   - recent rash on torso that has returned.Guille says this is the same rash. Derm consuled. 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   Reemerged 12/9, resumed triamcinolone TID on belly, back   9. Dispo:  After discussion with Dr Mukherjee, looking for common cause.Some kind of auto immune process DDX:  Sarcoidosis? Stable but increased LAP, splenomegaly with  possible calcified nodules, prolonged cytopenias, high sIL2, and lack of deterioration over 3 months of no Dx argue against infection and cancer, and suggest autoimmunity-highest on list is. sarcoid. Asked pulmonary to biopsy a mediastinal node, will check ACE=which is in the normal range, VitD3 very elevated and T cell subset panel does not show definative inversion of ratio.  pathology  reviewed his recent marrow for granulomas- subcortical so no good answer.Unable to get biopsy 12/11, not safe in endoscopy and unable to get platelets over 23 after 2 bags. Started treating  him with steroids today,prednisone 30 mg daily discussed with Dr lucio by Dr Mukherjee.   - Plan on biopsy on Monday, Guille is on the schedule.  Plan per interventional pulmonology is to take Mr. Yanez for bronchoscopy with EBUS as an outpatient on Monday 12/16/19 at 12:40 PM (needs to arrive two hours in advance, NPO at midnight).  Will plan on platelet transfusion Monday morning. May need DDAVP in pre-op for BUN. As this will be with GA in a more controlled setting, platelet goal can be liberated to >25K from 50K.   Per Epic message by staff to have pulmonary folks help us arrange this: Dr Gray: from IR pulmonary: We will need to coordinate platelet transfusion. We should do 2U preop (ok to have 2nd one hanging on transport to OR room) with 3rd Unit hold. He should not get this transfused in the infusion center but in preop area. He also needs DDAVP ordered as well.     Discharge today, afebrile, still with cough, stable sob, o2 sats 96% on room air.Give RBC before discharge. Says he has robitussin with codeine and tessalon perles at home  Follow up in clinic tomorrow for likely platelets.  Next available with Dr Lucio.    BMT Clinic (date, time, provider):   BMT Clinic in the [facility] and surgery center: [address] [facility], 2nd floor      12/13/2019:  BMT clinic, 2nd floor:check in at 08:30 am with possible infusion of platelets at 09:00  am and see provider at 09:15 am     12/16/2019, Monday: Plan is to take Mr. Yanez for bronchoscopy with EBUS( biopsy of mediastinal lymph node) as an outpatient on Monday 12/16/19 at 12:40 PM (needs to arrive two hours in advance, nothing by mouth at midnight).  Will plan on platelet transfusion/s on Monday morning thru the preop area, not the BMT clinic.  Tom Patino MD, interventional pullmonary is one of the physicians you saw inpatient.     Infectious Disease clinic with Dr Albarado, scheduled on 12/17/2019 check in at 1:45 pm     BMT Clinic with Dr Lucio on 12/31/2019:  Check in at 03:45 pm for labs and see Dr Lucio at 4:30 pm     Discharge Disposition:    Discharged to home.    BMT Contact Information  For issues including fevers 100.5 or more:  Please call during the week: Monday through Friday between hours of 8:00 am and 4:30 pm- Call BMT office- [phone]  After hours/Weekends: Please call [facility]  and ask for BMT physician on call and the  will have the BMT physician call you back: [phone]    Funmi Ospina PA-C  12/12/2019

## 2019-12-12 NOTE — INTERIM SUMMARY
BMT Summary of Care    December 12, 2019 10:04 AM  Angel Yanez  MRN: [id number]    Discharge Date: 12/12/2019    BMT Primary Physician: Dr Lucio    BMT Nurse Coordinator: Su Pate RN    Discharge Diagnosis: S/P BMT    Discharge To: BMT Clinic and Home    Activity: As tolerated    Catheter Care: None          IV Medications through home infusion: None    Nutrition: Regular diet as tolerated    Blood Transfusions:  Transfuse if Hemoglobin < or equal 8 mg/dL  Red Blood Cell Order: 2 units, irradiated and leukoreduced   Transfuse if Platelet count < or equal 10 uL  Platelet order:1 adult dose, irradiated and leukoreduced      Intravenous Electrolyte Replacement:  As per BMT clinic replacement scale  Outpatient Pharmacy:  G-CSF to be given in clinic: (dose) G-CSF if ANC 1000 or less    Laboratory Tests:  At next clinic appointment (date: 12/13/2019)  Hemogram (CBC) differential, platelet count  Comprehensive Metabolic Panel    Support Services:  None    Appointments:   BMT Clinic (date, time, provider):   BMT Clinic in the [facility] and surgery center: [address] [place] [address], 2nd floor     12/13/2019:  BMT clinic, 2nd floor:check in at 08:30 am with possible infusion of platelets at 09:00 am and see provider at 09:15 am    12/16/2019, Monday: Plan is to take Mr. Yanez for bronchoscopy with EBUS( biopsy of mediastinal lymph node) as an outpatient on Monday 12/16/19 at 12:40 PM (needs to arrive two hours in advance, nothing by mouth at midnight).  Will plan on platelet transfusion/s on Monday morning thru the preop area, not the BMT clinic.  Tom Patino MD, interventional pullmonary is one of the physicians you saw inpatient.    Infectious Disease clinic with Dr Albarado, scheduled on 12/17/2019 check in at 1:45 pm    BMT Clinic with Dr Lucio on 12/31/2019:  Check in at 03:45 pm for labs and see Dr Lucio at 4:30 pm          BMT Contact Information:-   For issues including fevers 100.5 or more:  Please call  during the week: Monday through Friday between hours of 8:00 am and 4:30 pm- Call BMT office- [phone]  After hours/Weekends: Please call [facility] : [phone] and ask for BMT physician on call and the  will have the BMT physician call you back.

## 2019-12-12 NOTE — PLAN OF CARE
Independent, Afebrile. VSS. Denies N/V/D. No complaints of pain. 1 Unit of PLT infusing now. No BM reported during shift. Adequate urine output. Continue with POC.        Problem: Adult Inpatient Plan of Care  Goal: Plan of Care Review  12/12/2019 0610 by Alejandra Galarza, RN  Outcome: No Change     Problem: Adult Inpatient Plan of Care  Goal: Patient-Specific Goal (Individualization)  Outcome: No Change     Problem: Electrolyte Imbalance (Acute Kidney Injury/Impairment)  Goal: Serum Electrolyte Balance  12/12/2019 0610 by Alejandra Galarza, RN  Outcome: No Change     Problem: Renal Function Impairment (Acute Kidney Injury/Impairment)  Goal: Effective Renal Function  12/12/2019 0610 by Alejandra Galarza, RN  Outcome: No Change     Problem: Infection  Goal: Infection Symptom Resolution  12/12/2019 0610 by Alejandra Galarza, RN  Outcome: No Change

## 2019-12-12 NOTE — PROGRESS NOTES
Interventional Pulmonology Update:  Plan is to take Mr. Yanez for bronchoscopy with EBUS as an outpatient on Monday 12/16/19 at 12:40 PM (needs to arrive two hours in advance, NPO at midnight). Discussed extensively with BMT service. Will plan on platelet transfusion Monday morning. May need DDAVP in pre-op for BUN. As this will be with GA in a more controlled setting, platelet goal can be liberated to >25K from 50K. If possible, and since we are planning on doing biopsy, our preference would be to withhold starting corticosteroids so as not to impact any yield of next weeks procedure.        Tom Patino MD, 12/12/2019, 8:09 AM  Interventional Pulmonology Fellow  Department of Pulmonary, Allergy, Critical Care & Sleep Medicine   Pager: [phone]

## 2019-12-12 NOTE — PROGRESS NOTES
Care Coordination     Returned MD-signed copy of completed Short-Term Disability Claim Form to patient (along with with discharge medication list and Discharge Summary) via email, per patient request. Sent to: [email or web address].    Ning Hawkins, RN, BSN  RN Care Coordinator - inpatient BMT, [address]  Ph [phone]   [phone]

## 2019-12-13 ENCOUNTER — INFUSION THERAPY VISIT (OUTPATIENT)
Dept: TRANSPLANT | Facility: CLINIC | Age: 59
End: 2019-12-13
Attending: INTERNAL MEDICINE
Payer: COMMERCIAL

## 2019-12-13 ENCOUNTER — TELEPHONE (OUTPATIENT)
Dept: INFECTIOUS DISEASES | Facility: CLINIC | Age: 59
End: 2019-12-13

## 2019-12-13 ENCOUNTER — CARE COORDINATION (OUTPATIENT)
Dept: ONCOLOGY | Facility: CLINIC | Age: 59
End: 2019-12-13

## 2019-12-13 VITALS
WEIGHT: 179 LBS | TEMPERATURE: 97.8 F | BODY MASS INDEX: 25.11 KG/M2 | HEART RATE: 82 BPM | DIASTOLIC BLOOD PRESSURE: 81 MMHG | RESPIRATION RATE: 16 BRPM | OXYGEN SATURATION: 96 % | SYSTOLIC BLOOD PRESSURE: 138 MMHG

## 2019-12-13 VITALS
TEMPERATURE: 98 F | SYSTOLIC BLOOD PRESSURE: 146 MMHG | DIASTOLIC BLOOD PRESSURE: 86 MMHG | OXYGEN SATURATION: 95 % | RESPIRATION RATE: 16 BRPM | HEART RATE: 68 BPM

## 2019-12-13 DIAGNOSIS — L40.50 PSORIASIS WITH ARTHROPATHY (H): Primary | ICD-10-CM

## 2019-12-13 DIAGNOSIS — N17.9 AKI (ACUTE KIDNEY INJURY) (H): ICD-10-CM

## 2019-12-13 DIAGNOSIS — C90.00 MULTIPLE MYELOMA NOT HAVING ACHIEVED REMISSION (H): Primary | ICD-10-CM

## 2019-12-13 LAB
ALBUMIN SERPL-MCNC: 2.3 G/DL (ref 3.4–5)
ALP SERPL-CCNC: 261 U/L (ref 40–150)
ALT SERPL W P-5'-P-CCNC: 21 U/L (ref 0–70)
ANION GAP SERPL CALCULATED.3IONS-SCNC: 6 MMOL/L (ref 3–14)
ANISOCYTOSIS BLD QL SMEAR: ABNORMAL
AST SERPL W P-5'-P-CCNC: 5 U/L (ref 0–45)
BASOPHILS # BLD AUTO: 0 10E9/L (ref 0–0.2)
BASOPHILS NFR BLD AUTO: 0 %
BILIRUB SERPL-MCNC: 1.5 MG/DL (ref 0.2–1.3)
BLD PROD TYP BPU: NORMAL
BLD UNIT ID BPU: 0
BLOOD PRODUCT CODE: NORMAL
BPU ID: NORMAL
BUN SERPL-MCNC: 32 MG/DL (ref 7–30)
CALCIUM SERPL-MCNC: 8.8 MG/DL (ref 8.5–10.1)
CHLORIDE SERPL-SCNC: 107 MMOL/L (ref 94–109)
CO2 SERPL-SCNC: 27 MMOL/L (ref 20–32)
CREAT SERPL-MCNC: 0.79 MG/DL (ref 0.66–1.25)
DIFFERENTIAL METHOD BLD: ABNORMAL
EOSINOPHIL # BLD AUTO: 0.6 10E9/L (ref 0–0.7)
EOSINOPHIL NFR BLD AUTO: 15.8 %
ERYTHROCYTE [DISTWIDTH] IN BLOOD BY AUTOMATED COUNT: 20 % (ref 10–15)
FUNGUS SPEC CULT: NORMAL
GFR SERPL CREATININE-BSD FRML MDRD: >90 ML/MIN/{1.73_M2}
GLUCOSE SERPL-MCNC: 106 MG/DL (ref 70–99)
HCT VFR BLD AUTO: 27.8 % (ref 40–53)
HGB BLD-MCNC: 9.6 G/DL (ref 13.3–17.7)
LYMPHOCYTES # BLD AUTO: 0.3 10E9/L (ref 0.8–5.3)
LYMPHOCYTES NFR BLD AUTO: 9.6 %
MCH RBC QN AUTO: 32 PG (ref 26.5–33)
MCHC RBC AUTO-ENTMCNC: 34.5 G/DL (ref 31.5–36.5)
MCV RBC AUTO: 93 FL (ref 78–100)
MONOCYTES # BLD AUTO: 0.4 10E9/L (ref 0–1.3)
MONOCYTES NFR BLD AUTO: 12.3 %
MYELOCYTES # BLD: 0 10E9/L
MYELOCYTES NFR BLD MANUAL: 0.9 %
NEUTROPHILS # BLD AUTO: 2.2 10E9/L (ref 1.6–8.3)
NEUTROPHILS NFR BLD AUTO: 61.4 %
OVALOCYTES BLD QL SMEAR: SLIGHT
PLATELET # BLD AUTO: 9 10E9/L (ref 150–450)
PLATELET # BLD EST: ABNORMAL 10*3/UL
POIKILOCYTOSIS BLD QL SMEAR: SLIGHT
POTASSIUM SERPL-SCNC: 3.4 MMOL/L (ref 3.4–5.3)
PROT SERPL-MCNC: 5.3 G/DL (ref 6.8–8.8)
RBC # BLD AUTO: 3 10E12/L (ref 4.4–5.9)
SODIUM SERPL-SCNC: 140 MMOL/L (ref 133–144)
SPECIMEN SOURCE: NORMAL
TRANSFUSION STATUS PATIENT QL: NORMAL
WBC # BLD AUTO: 3.6 10E9/L (ref 4–11)

## 2019-12-13 PROCEDURE — 40000556 ZZH STATISTIC PERIPHERAL IV START W US GUIDANCE

## 2019-12-13 PROCEDURE — 85025 COMPLETE CBC W/AUTO DIFF WBC: CPT | Performed by: PHYSICIAN ASSISTANT

## 2019-12-13 PROCEDURE — 80053 COMPREHEN METABOLIC PANEL: CPT | Performed by: PHYSICIAN ASSISTANT

## 2019-12-13 PROCEDURE — 36430 TRANSFUSION BLD/BLD COMPNT: CPT

## 2019-12-13 PROCEDURE — P9037 PLATE PHERES LEUKOREDU IRRAD: HCPCS | Performed by: PHYSICIAN ASSISTANT

## 2019-12-13 ASSESSMENT — PAIN SCALES - GENERAL: PAINLEVEL: NO PAIN (0)

## 2019-12-13 NOTE — TELEPHONE ENCOUNTER
Left message for pt reminding them of upcoming appointment.  Instructed pt to bring updated medications list.    Lizbeth Cavazos CMA    12/13/2019 3:27 PM

## 2019-12-13 NOTE — PROGRESS NOTES
Check in on Monday, 12/16/2019 for platelets on the third floor at 08:45 am.  Guille says they just called him to tell him this.  Knows to be NPO.    Funmi Ospina PA-C  [phone]

## 2019-12-13 NOTE — NURSING NOTE
"Oncology Rooming Note    December 13, 2019 9:31 AM   Angel Yanez is a 59 year old male who presents for:    Chief Complaint   Patient presents with     Lab Only     venipuncture, vtials checked     RECHECK     Return: MM      Initial Vitals: /81   Pulse 82   Temp 97.8  F (36.6  C)   Resp 16   Wt 81.2 kg (179 lb)   SpO2 96%   BMI 25.11 kg/m   Estimated body mass index is 25.11 kg/m  as calculated from the following:    Height as of 10/29/19: 1.798 m (5' 10.8\").    Weight as of this encounter: 81.2 kg (179 lb). Body surface area is 2.01 meters squared.  No Pain (0) Comment: Data Unavailable   No LMP for male patient.  Allergies reviewed: Yes  Medications reviewed: Yes    Medications: Medication refills not needed today.  Pharmacy name entered into EPIC:    [facility] - [place], [place] - [address] [facility]Y [facility] - [place], [place] - [address]    Clinical concerns: Pain and swelling in both feet       Kathy Perez CMA              "

## 2019-12-13 NOTE — PROGRESS NOTES
BMT Daily Progress Note   12/13/2019     Patient ID:  Angel Yanez is a 58 yo man D+210 s/p 2nd autologous transplant for MM.   S/p 2 readmissions for FUO (10/16-10/23; 9/23-9/29/19). Readmitted on 12/9/2019 with return of fevers despite celebrex, FARZANA, worsening sob and return of rash         Diagnosis MM Multiple myeloma  HCT Type Autologous    Prep Regimen Cytoxan  Melphalan   Donor Source No data was found    GVHD Prophylaxis No  [facility]2019 for FARZANA and increased sob with chronic cough.     INTERVAL  HISTORY   Fevers have resolved after starting prednisone.  Took tylenol 500mg x1 last evening. Still with cough. No bleeding. Appetite okay. No n/v/d. Frustrated with needing plts again today.     Review of Systems: 10 point ROS negative except as noted above.        PHYSICAL EXAM      Weight In/Out          Wt Readings from Last 3 Encounters:   12/11/19 81.4 kg (179 lb 8 oz)   12/09/19 78.3 kg (172 lb 11.2 oz)   12/06/19 76.8 kg (169 lb 4.8 oz)        I/O last 3 completed shifts:  In: 1477 [P.O.:700; I.V.:200]  Out: 1500 [Urine:1500]      Blood pressure 138/81, pulse 82, temperature 97.8  F (36.6  C), resp. rate 16, weight 81.2 kg (179 lb), SpO2 96 %.    General: NAD   Eyes: : FARRAH, sclera anicteric   Nose/Mouth/Throat: OP clear, buccal mucosa moist, no ulcerations   Lungs:  diminished bases  Cardiovascular: RRR, no M/R/G   Abdominal/Rectal: +BS, soft, NT, ND, No HSM   Lymphatics: no edema  Skin: no  [place], Non-confluent macular pink rash on chest and back, few on upper thighs  Neuro: A&O   Additional Findings: Peripheral IV        LABS AND IMAGING - PAST 24 HOURS     Lab Results   Component Value Date    WBC 3.6 (L) 12/13/2019    ANEU 2.2 12/13/2019    HGB 9.6 (L) 12/13/2019    HCT 27.8 (L) 12/13/2019    PLT 9 (LL) 12/13/2019     12/13/2019    POTASSIUM 3.4 12/13/2019    CHLORIDE 107 12/13/2019    CO2 27 12/13/2019     (H) 12/13/2019     BUN 32 (H) 12/13/2019    CR 0.79 12/13/2019    MAG 2.1 11/26/2019    INR 1.53 (H) 12/09/2019    BILITOTAL 1.5 (H) 12/13/2019    AST 5 12/13/2019    ALT 21 12/13/2019    ALKPHOS 261 (H) 12/13/2019    PROTTOTAL 5.3 (L) 12/13/2019    ALBUMIN 2.3 (L) 12/13/2019        OVERALL PLAN   Angel Yanez is a 60 yo man D+210 s/p 2nd autologous transplant for MM.   S/p 2 readmissions for FUO (10/16-10/23; 9/23-9/29/19).      1.  BMT/MM:   Slow engraftment; cell dose was only 0.639x10^6. (Marrow Baggs - known poor cell dose as failed chemo-mobilization 1/2019.)   - day +180 bone marrow biopsy 11/6/19 which showed no morphologic or immunophenotypic evidence of plasma cell neoplasm. His light chains were not elevated and he had no monoclonal protein in the serum.  He is in clinical remission.  - PET in 8/2019 clear.   - PB FLOW 11/23: No overt aberrant immunophenotype on T cells.  Rare to absent mature B cells and plasma cells.      2.  HEME: Keep Hgb>8g/dL, plt >10 .  -Remains transfusions dependent. Had planned on doing any EBUS of mediastinal LN on 12/11/2019 but could not get platelets high enough( 50,000) to do in endoscopy. Plts today and preorder for Sunday     Last GCSF on 12/13/2019 Give prn.   -Pancytopenia post BMT, low cell dose, +/- infection.   - No evidence of HLH on lymphnode bx 10/18/19.     - Persistent thrombocytopenia: BM 11/6/19 showed adequate granulopoiesis and erythropoiesis but his platelet precursors were 0 to absent which is reflected in peripheral CBC. Started promactic 50mcg daily on 11/10. Has been titrated up to 150mg daily (started 11/23); no change. Promacta stopped 12/6/2019 as not helping.  ( Guille actually several days before as he ran out of this)  - Hemolysis eval neg on 11/10/2019      3.  ID: Fevers to 104 on admit despite celebrex.  No evidence of infection. Now on prednisone  -Stopped celebrex 12/9 due to FARZANA. Scheduled tylenol BID on admit, stopped scheduled tylenol on discharge.  -12/9  RVP=neg  -12/9 CT chest is stable with GGO better. No antibiotics given during admission because do not feel like fevers are infection.  Hx FUO  A.) Extensive ID work up unremarkable  sept 2019 and oct and again Nov. 10/16 and 11/9 repeat chest CT: persistent mediastinal adenopathy no paranchymal disease. S/P bx of L axillary node on 10/18: No evidence of HLH, tissues looks reactive.   - s/p excisional R axillary lymph node bx 11/15/19 (neg core bx 10/18 L axillary). Bx sent to [facility] 11/26 -Called surg path and they faxed copy of LN biopsy consult from [facility]- reviewed report with Dr Mukherjee 12/10.  No definitive diagnosis.  - HTLV neg with very low lymphocyte count. CD4 count 146.  - Per ID with negative w/u and not neutropenic discontinued cefepime 11/12. 11/15 CT Chest new GGO.  RVP neg. Pulm consulted. No indication for BAL or further treatment/testing. Completed a zpak 11/21.  Saw ID outpatient on 12/3/2019: see note.  - Per ID, possible histoplasmosis: empiric itraconazole 200 TID started  with fevers initially resolved but then recurred; admitted on bid dosing. Stopped itraconazole with elevated LFT and recurrence of fever. 10/18 Histo/blasto urine antigen negative. Per Dr Kelley flucaonzole can suppress growth? 10/18 Kairus DNA test- negative. 1/12 itra level=1.6.    Right LNTissue culture (11/15): positive for stenotrophomonas malophilia and Ochrobactrum anthropi from broth only. Sensitive to bactrim and levaquin. Given culture + in broth only, this is likely contaminant but given FUO treated empirically. Was on  Levaquin 750mg daily 11/26, stopped 12/6 as no impact to fever trends.   - As per ID's note 12/3 and after discussion with Dr Lucio, Stopped itraconazole on 12/9 as no impact to fever trend (could also be potentiation FARZANA offending medications)  B.) Autoimmune workup so far negative. Rheum consult, last note on 11/14: felt no evidence of autoimmune disease flair neg studies above but recommended  evaluate possible granulomatous finding with lymph nose biopsy to r/o sarcoidosis in the setting of FUO and elevated soluble IL-2 RA--  C.) Malignancy W/U: Neg BM BX 11/6. Neg PET in 8/2019. Neg core lymph node needle bx 10/18. PSA neg. Admit CT with R kidney hypoenhancing ill-defined focus. MRI 11/13 not suggestive of urothelial or renal cell carcinoma.   Prophy: Held acyclovir due to FARZANA on admission but restarted and discharged on acyclovir.  Remains on pentamidine (11/19). See repeat t cell subset, Absolute AM4=592.  Started fluconazole with prednisone but Dr Mukherjee did not feel like levaquin was warranted.    - influenza vaccination given 11/26/19          4. Pulm:  Sob again with exertion over  Weekend prior to admission.  Still with chronic, non productive cough.  Repeat CT on 12/9/19 with improved GGO and stable nodularity and pleural effusion smaller.  Boarder lines sats, decreased to 89% with coughingg.  Walked with O2 sat monitor in clinic without drop.Sating 96% on RA on discharge.  -no antibiotics with stable to improved CT.  RVP from 12/9=neg.  - 10/17: Echo with preserved EF, no evidence of right heart strain. Repeat echo 11/14 stable.  - 10/17 VQ scan neg for PE     5. GI: Nausea with vomiting this weekend. 12/9: New diarrhea, belly cramping x~2 days  12/9 C diff/ enteric =neg.  Adeno=neg.  Imodium prn.Diarrhea resolved on discharge.  - LFTs wnl 12/1 but increased on admission.  INcluding elevated bilirubin, alk phos.  Itraconazole now stopped.        6.  FEN/Renal:   New FARZANA to 2.57. Now off celebrex.  Improved.  DDAVP planned on Monday prior to bx in OR     7.  Mood: Continue Paxil.     8. Derm:   - recent rash on torso that has returned.Guille says this is the same rash. Derm consuled. 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   Reemerged 12/9, resumed triamcinolone TID on torso     9. Dispo:  After  discussion with Dr Mukherjee, looking for common cause.Some kind of auto immune process DDX:  Sarcoidosis? Stable but increased LAP, splenomegaly with possible calcified nodules, prolonged cytopenias, high sIL2, and lack of deterioration over 3 months of no Dx argue against infection and cancer, and suggest autoimmunity-highest on list is. sarcoid. Asked pulmonary to biopsy a mediastinal node, will check ACE=which is in the normal range, VitD3 very elevated and T cell subset panel does not show definative inversion of ratio.  pathology  reviewed his recent marrow for granulomas- subcortical so no good answer.Unable to get biopsy 12/11, not safe in endoscopy and unable to get platelets over 23 after 2 bags. Started treating  him with steroids today,prednisone 30 mg daily discussed with Dr shi by Dr Mukherjee.   - Plan on biopsy on Monday, Guille is on the schedule.  Plan per interventional pulmonology is to take Mr. Yanez for bronchoscopy with EBUS as an outpatient on Monday 12/16/19 at 12:40 PM (needs to arrive two hours in advance, NPO at midnight).  Will plan on platelet transfusion Monday morning. May need DDAVP in pre-op for BUN. As this will be with GA in a more controlled setting, platelet goal can be liberated to >25K from 50K.   Per Epic message by staff to have pulmonary folks help us arrange this: Dr Gray: from IR pulmonary: We will need to coordinate platelet transfusion. We should do 2U preop (ok to have 2nd one hanging on transport to OR room) with 3rd Unit hold. He should not get this transfused in the infusion center but in preop area. He also needs DDAVP ordered as well.       RETURN TO CLINIC:  Sunday for plts  Monday-will get EBUS on Monday-planned for DDAVP and 3u plts (1 intraop).  Tuesday for f/u, plts BMT  Tues for ID f/u   12/31 Dr. Naveed Jolly NP

## 2019-12-13 NOTE — PROGRESS NOTES
Spoke with Margarette Rodrgiuez,nurse practitioner for pulm interventional and La Nena Rodas( [phone]) who are working on getting the patient into preop for platelets before LN biopsy via EBUS on MOnday 12/16 at 11:45- needs to be there just for procedure 2 hours earlier.. Both individuals assured me that they will arrange this including platelets in preop and be in contact with Guille Gates. Discussed with Dr Gray-from pulm intervention on 12/11 The feeling is that giving platelets in the BMT clinic starting the earliest  After the RN comes in in the bmt clinic and starts plts some time after 07:30 and then trying to get the patient to the hospital in time for the procedure would be difficult.     Funmi Ospina PA-C  12/13/2019

## 2019-12-15 ENCOUNTER — APPOINTMENT (OUTPATIENT)
Dept: LAB | Facility: CLINIC | Age: 59
End: 2019-12-15
Attending: INTERNAL MEDICINE
Payer: COMMERCIAL

## 2019-12-15 ENCOUNTER — INFUSION THERAPY VISIT (OUTPATIENT)
Dept: TRANSPLANT | Facility: CLINIC | Age: 59
End: 2019-12-15
Attending: INTERNAL MEDICINE
Payer: COMMERCIAL

## 2019-12-15 VITALS
TEMPERATURE: 97.9 F | HEART RATE: 78 BPM | RESPIRATION RATE: 16 BRPM | OXYGEN SATURATION: 96 % | BODY MASS INDEX: 24.41 KG/M2 | SYSTOLIC BLOOD PRESSURE: 160 MMHG | DIASTOLIC BLOOD PRESSURE: 90 MMHG | WEIGHT: 174 LBS

## 2019-12-15 DIAGNOSIS — C90.00 MULTIPLE MYELOMA NOT HAVING ACHIEVED REMISSION (H): Primary | ICD-10-CM

## 2019-12-15 DIAGNOSIS — L40.50 PSORIASIS WITH ARTHROPATHY (H): ICD-10-CM

## 2019-12-15 LAB
ANISOCYTOSIS BLD QL SMEAR: ABNORMAL
BASOPHILS # BLD AUTO: 0 10E9/L (ref 0–0.2)
BASOPHILS NFR BLD AUTO: 0 %
BLD PROD TYP BPU: NORMAL
BLD UNIT ID BPU: 0
BLOOD PRODUCT CODE: NORMAL
BPU ID: NORMAL
DIFFERENTIAL METHOD BLD: ABNORMAL
EOSINOPHIL # BLD AUTO: 0.9 10E9/L (ref 0–0.7)
EOSINOPHIL NFR BLD AUTO: 22 %
ERYTHROCYTE [DISTWIDTH] IN BLOOD BY AUTOMATED COUNT: 19.6 % (ref 10–15)
HCT VFR BLD AUTO: 26.6 % (ref 40–53)
HGB BLD-MCNC: 8.8 G/DL (ref 13.3–17.7)
LYMPHOCYTES # BLD AUTO: 1.1 10E9/L (ref 0.8–5.3)
LYMPHOCYTES NFR BLD AUTO: 29 %
MCH RBC QN AUTO: 31.9 PG (ref 26.5–33)
MCHC RBC AUTO-ENTMCNC: 33.1 G/DL (ref 31.5–36.5)
MCV RBC AUTO: 96 FL (ref 78–100)
MONOCYTES # BLD AUTO: 0.1 10E9/L (ref 0–1.3)
MONOCYTES NFR BLD AUTO: 2 %
NEUTROPHILS # BLD AUTO: 1.8 10E9/L (ref 1.6–8.3)
NEUTROPHILS NFR BLD AUTO: 47 %
PLATELET # BLD AUTO: 7 10E9/L (ref 150–450)
POIKILOCYTOSIS BLD QL SMEAR: SLIGHT
RBC # BLD AUTO: 2.76 10E12/L (ref 4.4–5.9)
TRANSFUSION STATUS PATIENT QL: NORMAL
TRANSFUSION STATUS PATIENT QL: NORMAL
WBC # BLD AUTO: 3.9 10E9/L (ref 4–11)

## 2019-12-15 PROCEDURE — P9037 PLATE PHERES LEUKOREDU IRRAD: HCPCS | Performed by: PHYSICIAN ASSISTANT

## 2019-12-15 PROCEDURE — 85025 COMPLETE CBC W/AUTO DIFF WBC: CPT | Performed by: NURSE PRACTITIONER

## 2019-12-15 PROCEDURE — 25000128 H RX IP 250 OP 636: Mod: ZF | Performed by: INTERNAL MEDICINE

## 2019-12-15 PROCEDURE — 36415 COLL VENOUS BLD VENIPUNCTURE: CPT

## 2019-12-15 PROCEDURE — 36430 TRANSFUSION BLD/BLD COMPNT: CPT

## 2019-12-15 RX ORDER — HEPARIN SODIUM,PORCINE 10 UNIT/ML
5 VIAL (ML) INTRAVENOUS
Status: DISCONTINUED | OUTPATIENT
Start: 2019-12-15 | End: 2019-12-15 | Stop reason: HOSPADM

## 2019-12-15 ASSESSMENT — PAIN SCALES - GENERAL: PAINLEVEL: MILD PAIN (2)

## 2019-12-15 NOTE — PROGRESS NOTES
Infusion Nursing Note:  Angel Yanez presents today for platelets.    Patient seen by provider today: No   present during visit today: Not Applicable.    Note: Patient received platelets with no premeds.  Patient complains of chest cold.  Patient having lung node biopsy tomorrow.  Patient is aware of instructions surrounding that appointment.    Intravenous Access:  Peripheral IV placed.    Treatment Conditions:  Lab Results   Component Value Date    HGB 8.8 12/15/2019     Lab Results   Component Value Date    WBC 3.9 12/15/2019      Lab Results   Component Value Date    ANEU 2.2 12/13/2019     Lab Results   Component Value Date    PLT 7 12/15/2019      Lab Results   Component Value Date     12/13/2019                   Lab Results   Component Value Date    POTASSIUM 3.4 12/13/2019           Lab Results   Component Value Date    MAG 2.1 11/26/2019            Lab Results   Component Value Date    CR 0.79 12/13/2019                   Lab Results   Component Value Date    ZHEN 8.8 12/13/2019                Lab Results   Component Value Date    BILITOTAL 1.5 12/13/2019           Lab Results   Component Value Date    ALBUMIN 2.3 12/13/2019                    Lab Results   Component Value Date    ALT 21 12/13/2019           Lab Results   Component Value Date    AST 5 12/13/2019       Results reviewed, labs MET treatment parameters, ok to proceed with treatment.      Post Infusion Assessment:  Patient tolerated infusion without incident.  Blood return noted pre and post infusion.  Site patent and intact, free from redness, edema or discomfort.       Discharge Plan:   Discharge instructions reviewed with: Patient.  Patient and/or family verbalized understanding of discharge instructions and all questions answered.  Patient discharged in stable condition accompanied by: wife.  Departure Mode: Ambulatory.    Marry Astorga RN

## 2019-12-15 NOTE — NURSING NOTE
Chief Complaint   Patient presents with     Blood Draw     Labs drawn via /PIV placed by RN in lab. VS Taken.     Clau Sibley RN

## 2019-12-16 ENCOUNTER — HOSPITAL ENCOUNTER (OUTPATIENT)
Facility: CLINIC | Age: 59
Discharge: HOME | End: 2019-12-16
Attending: INTERNAL MEDICINE | Admitting: INTERNAL MEDICINE
Payer: COMMERCIAL

## 2019-12-16 ENCOUNTER — ANESTHESIA EVENT (OUTPATIENT)
Dept: SURGERY | Facility: CLINIC | Age: 59
End: 2019-12-16

## 2019-12-16 ENCOUNTER — ANESTHESIA (OUTPATIENT)
Dept: SURGERY | Facility: CLINIC | Age: 59
End: 2019-12-16

## 2019-12-16 VITALS
RESPIRATION RATE: 20 BRPM | SYSTOLIC BLOOD PRESSURE: 150 MMHG | HEIGHT: 70 IN | BODY MASS INDEX: 23.86 KG/M2 | HEART RATE: 80 BPM | DIASTOLIC BLOOD PRESSURE: 90 MMHG | OXYGEN SATURATION: 97 % | WEIGHT: 166.67 LBS | TEMPERATURE: 97.7 F

## 2019-12-16 DIAGNOSIS — R59.1 LYMPHADENOPATHY: ICD-10-CM

## 2019-12-16 LAB
APPEARANCE FLD: NORMAL
BACTERIA SPEC CULT: NO GROWTH
BACTERIA SPEC CULT: NO GROWTH
BLD PROD TYP BPU: NORMAL
BLD UNIT ID BPU: 0
BLD UNIT ID BPU: 0
BLOOD PRODUCT CODE: NORMAL
BLOOD PRODUCT CODE: NORMAL
BPU ID: NORMAL
BPU ID: NORMAL
CD19 CELLS NFR BRONCH: 0 %
CD19 CELLS NFR BRONCH: NORMAL %
CD3 CELLS NFR BRONCH: 99 %
CD3 CELLS NFR BRONCH: NORMAL %
CD3+CD4+ CELLS NFR BRONCH: 4 %
CD3+CD4+ CELLS NFR BRONCH: NORMAL %
CD3+CD4+ CELLS/CD3+CD8+ CLL BRONCH: 0.04 %
CD3+CD4+ CELLS/CD3+CD8+ CLL BRONCH: NORMAL %
CD3+CD8+ CELLS NFR BRONCH: 94 %
CD3+CD8+ CELLS NFR BRONCH: NORMAL %
CD3-CD16+CD56+ CELLS NFR SPEC: 4 %
CD3-CD16+CD56+ CELLS NFR SPEC: NORMAL %
COLOR FLD: COLORLESS
COPATH REPORT: NORMAL
EOSINOPHIL NFR FLD MANUAL: 4 %
ERYTHROCYTE [DISTWIDTH] IN BLOOD BY AUTOMATED COUNT: 19.4 % (ref 10–15)
GLUCOSE BLDC GLUCOMTR-MCNC: 102 MG/DL (ref 70–99)
GRAM STN SPEC: NORMAL
HCT VFR BLD AUTO: 28.5 % (ref 40–53)
HGB BLD-MCNC: 9.4 G/DL (ref 13.3–17.7)
IFC SPECIMEN: NORMAL
IFC SPECIMEN: NORMAL
KOH PREP SPEC: NORMAL
LYMPHOCYTES NFR FLD MANUAL: 7 %
Lab: NORMAL
MCH RBC QN AUTO: 31.8 PG (ref 26.5–33)
MCHC RBC AUTO-ENTMCNC: 33 G/DL (ref 31.5–36.5)
MCV RBC AUTO: 96 FL (ref 78–100)
MONOS+MACROS NFR FLD MANUAL: 89 %
NUM BPU REQUESTED: 2
PLATELET # BLD AUTO: 12 10E9/L (ref 150–450)
PLATELET # BLD AUTO: 69 10E9/L (ref 150–450)
PLATELET # BLD AUTO: 70 10E9/L (ref 150–450)
RBC # BLD AUTO: 2.96 10E12/L (ref 4.4–5.9)
SPECIMEN SOURCE FLD: NORMAL
SPECIMEN SOURCE: NORMAL
T-CELL SUBSET INTERPRETATION: NORMAL
T-CELL SUBSET INTERPRETATION: NORMAL
TRANSFUSION STATUS PATIENT QL: NORMAL
WBC # BLD AUTO: 3.5 10E9/L (ref 4–11)
WBC # FLD AUTO: 64 /UL

## 2019-12-16 PROCEDURE — 87206 SMEAR FLUORESCENT/ACID STAI: CPT | Performed by: INTERNAL MEDICINE

## 2019-12-16 PROCEDURE — 85049 AUTOMATED PLATELET COUNT: CPT | Performed by: ANESTHESIOLOGY

## 2019-12-16 PROCEDURE — 86850 RBC ANTIBODY SCREEN: CPT | Performed by: INTERNAL MEDICINE

## 2019-12-16 PROCEDURE — 82962 GLUCOSE BLOOD TEST: CPT

## 2019-12-16 PROCEDURE — 88173 CYTOPATH EVAL FNA REPORT: CPT | Performed by: INTERNAL MEDICINE

## 2019-12-16 PROCEDURE — P9037 PLATE PHERES LEUKOREDU IRRAD: HCPCS | Performed by: INTERNAL MEDICINE

## 2019-12-16 PROCEDURE — 25800030 ZZH RX IP 258 OP 636: Performed by: ANESTHESIOLOGY

## 2019-12-16 PROCEDURE — 87015 SPECIMEN INFECT AGNT CONCNTJ: CPT | Performed by: INTERNAL MEDICINE

## 2019-12-16 PROCEDURE — 36000062 ZZH SURGERY LEVEL 4 1ST 30 MIN - UMMC: Performed by: INTERNAL MEDICINE

## 2019-12-16 PROCEDURE — 71000027 ZZH RECOVERY PHASE 2 EACH 15 MINS: Performed by: INTERNAL MEDICINE

## 2019-12-16 PROCEDURE — 40000170 ZZH STATISTIC PRE-PROCEDURE ASSESSMENT II: Performed by: INTERNAL MEDICINE

## 2019-12-16 PROCEDURE — 25000566 ZZH SEVOFLURANE, EA 15 MIN: Performed by: INTERNAL MEDICINE

## 2019-12-16 PROCEDURE — 88312 SPECIAL STAINS GROUP 1: CPT | Performed by: INTERNAL MEDICINE

## 2019-12-16 PROCEDURE — 37000008 ZZH ANESTHESIA TECHNICAL FEE, 1ST 30 MIN: Performed by: INTERNAL MEDICINE

## 2019-12-16 PROCEDURE — 87205 SMEAR GRAM STAIN: CPT | Performed by: INTERNAL MEDICINE

## 2019-12-16 PROCEDURE — 87102 FUNGUS ISOLATION CULTURE: CPT | Performed by: INTERNAL MEDICINE

## 2019-12-16 PROCEDURE — 87116 MYCOBACTERIA CULTURE: CPT | Performed by: INTERNAL MEDICINE

## 2019-12-16 PROCEDURE — 88172 CYTP DX EVAL FNA 1ST EA SITE: CPT | Performed by: INTERNAL MEDICINE

## 2019-12-16 PROCEDURE — 89051 BODY FLUID CELL COUNT: CPT | Performed by: INTERNAL MEDICINE

## 2019-12-16 PROCEDURE — 36415 COLL VENOUS BLD VENIPUNCTURE: CPT | Performed by: ANESTHESIOLOGY

## 2019-12-16 PROCEDURE — 85027 COMPLETE CBC AUTOMATED: CPT | Performed by: ANESTHESIOLOGY

## 2019-12-16 PROCEDURE — 86356 MONONUCLEAR CELL ANTIGEN: CPT | Performed by: PHYSICIAN ASSISTANT

## 2019-12-16 PROCEDURE — 88108 CYTOPATH CONCENTRATE TECH: CPT | Performed by: INTERNAL MEDICINE

## 2019-12-16 PROCEDURE — 87070 CULTURE OTHR SPECIMN AEROBIC: CPT | Performed by: INTERNAL MEDICINE

## 2019-12-16 PROCEDURE — 87081 CULTURE SCREEN ONLY: CPT | Performed by: INTERNAL MEDICINE

## 2019-12-16 PROCEDURE — 87633 RESP VIRUS 12-25 TARGETS: CPT | Performed by: INTERNAL MEDICINE

## 2019-12-16 PROCEDURE — 86356 MONONUCLEAR CELL ANTIGEN: CPT | Performed by: INTERNAL MEDICINE

## 2019-12-16 PROCEDURE — 00000155 ZZHCL STATISTIC H-CELL BLOCK W/STAIN: Performed by: INTERNAL MEDICINE

## 2019-12-16 PROCEDURE — 25000125 ZZHC RX 250: Performed by: NURSE ANESTHETIST, CERTIFIED REGISTERED

## 2019-12-16 PROCEDURE — 25000128 H RX IP 250 OP 636: Performed by: NURSE ANESTHETIST, CERTIFIED REGISTERED

## 2019-12-16 PROCEDURE — 86923 COMPATIBILITY TEST ELECTRIC: CPT | Performed by: INTERNAL MEDICINE

## 2019-12-16 PROCEDURE — 87210 SMEAR WET MOUNT SALINE/INK: CPT | Performed by: INTERNAL MEDICINE

## 2019-12-16 PROCEDURE — 27210794 ZZH OR GENERAL SUPPLY STERILE: Performed by: INTERNAL MEDICINE

## 2019-12-16 PROCEDURE — 88305 TISSUE EXAM BY PATHOLOGIST: CPT | Performed by: INTERNAL MEDICINE

## 2019-12-16 PROCEDURE — 36000064 ZZH SURGERY LEVEL 4 EA 15 ADDTL MIN - UMMC: Performed by: INTERNAL MEDICINE

## 2019-12-16 PROCEDURE — 86901 BLOOD TYPING SEROLOGIC RH(D): CPT | Performed by: INTERNAL MEDICINE

## 2019-12-16 PROCEDURE — 71000014 ZZH RECOVERY PHASE 1 LEVEL 2 FIRST HR: Performed by: INTERNAL MEDICINE

## 2019-12-16 PROCEDURE — 37000009 ZZH ANESTHESIA TECHNICAL FEE, EACH ADDTL 15 MIN: Performed by: INTERNAL MEDICINE

## 2019-12-16 PROCEDURE — 86900 BLOOD TYPING SEROLOGIC ABO: CPT | Performed by: INTERNAL MEDICINE

## 2019-12-16 RX ORDER — PROPOFOL 10 MG/ML
INJECTION, EMULSION INTRAVENOUS PRN
Status: DISCONTINUED | OUTPATIENT
Start: 2019-12-16 | End: 2019-12-16

## 2019-12-16 RX ORDER — FENTANYL CITRATE 50 UG/ML
25-50 INJECTION, SOLUTION INTRAMUSCULAR; INTRAVENOUS
Status: DISCONTINUED | OUTPATIENT
Start: 2019-12-16 | End: 2019-12-16 | Stop reason: HOSPADM

## 2019-12-16 RX ORDER — ONDANSETRON 2 MG/ML
INJECTION INTRAMUSCULAR; INTRAVENOUS PRN
Status: DISCONTINUED | OUTPATIENT
Start: 2019-12-16 | End: 2019-12-16

## 2019-12-16 RX ORDER — ONDANSETRON 4 MG/1
4 TABLET, ORALLY DISINTEGRATING ORAL EVERY 30 MIN PRN
Status: DISCONTINUED | OUTPATIENT
Start: 2019-12-16 | End: 2019-12-16 | Stop reason: HOSPADM

## 2019-12-16 RX ORDER — ONDANSETRON 2 MG/ML
4 INJECTION INTRAMUSCULAR; INTRAVENOUS EVERY 30 MIN PRN
Status: DISCONTINUED | OUTPATIENT
Start: 2019-12-16 | End: 2019-12-16 | Stop reason: HOSPADM

## 2019-12-16 RX ORDER — DEXAMETHASONE SODIUM PHOSPHATE 4 MG/ML
INJECTION, SOLUTION INTRA-ARTICULAR; INTRALESIONAL; INTRAMUSCULAR; INTRAVENOUS; SOFT TISSUE PRN
Status: DISCONTINUED | OUTPATIENT
Start: 2019-12-16 | End: 2019-12-16

## 2019-12-16 RX ORDER — LIDOCAINE HYDROCHLORIDE 20 MG/ML
INJECTION, SOLUTION INFILTRATION; PERINEURAL PRN
Status: DISCONTINUED | OUTPATIENT
Start: 2019-12-16 | End: 2019-12-16

## 2019-12-16 RX ORDER — NALOXONE HYDROCHLORIDE 0.4 MG/ML
.1-.4 INJECTION, SOLUTION INTRAMUSCULAR; INTRAVENOUS; SUBCUTANEOUS
Status: DISCONTINUED | OUTPATIENT
Start: 2019-12-16 | End: 2019-12-16 | Stop reason: HOSPADM

## 2019-12-16 RX ORDER — FENTANYL CITRATE 50 UG/ML
INJECTION, SOLUTION INTRAMUSCULAR; INTRAVENOUS PRN
Status: DISCONTINUED | OUTPATIENT
Start: 2019-12-16 | End: 2019-12-16

## 2019-12-16 RX ORDER — SODIUM CHLORIDE, SODIUM LACTATE, POTASSIUM CHLORIDE, CALCIUM CHLORIDE 600; 310; 30; 20 MG/100ML; MG/100ML; MG/100ML; MG/100ML
INJECTION, SOLUTION INTRAVENOUS CONTINUOUS
Status: DISCONTINUED | OUTPATIENT
Start: 2019-12-16 | End: 2019-12-16 | Stop reason: HOSPADM

## 2019-12-16 RX ORDER — PROPOFOL 10 MG/ML
INJECTION, EMULSION INTRAVENOUS CONTINUOUS PRN
Status: DISCONTINUED | OUTPATIENT
Start: 2019-12-16 | End: 2019-12-16

## 2019-12-16 RX ORDER — LIDOCAINE 40 MG/G
CREAM TOPICAL
Status: DISCONTINUED | OUTPATIENT
Start: 2019-12-16 | End: 2019-12-16 | Stop reason: HOSPADM

## 2019-12-16 RX ORDER — MEPERIDINE HYDROCHLORIDE 25 MG/ML
12.5 INJECTION INTRAMUSCULAR; INTRAVENOUS; SUBCUTANEOUS
Status: CANCELLED | OUTPATIENT
Start: 2019-12-16

## 2019-12-16 RX ADMIN — ONDANSETRON 4 MG: 2 INJECTION INTRAMUSCULAR; INTRAVENOUS at 12:37

## 2019-12-16 RX ADMIN — FENTANYL CITRATE 150 MCG: 50 INJECTION, SOLUTION INTRAMUSCULAR; INTRAVENOUS at 12:24

## 2019-12-16 RX ADMIN — SODIUM CHLORIDE, POTASSIUM CHLORIDE, SODIUM LACTATE AND CALCIUM CHLORIDE: 600; 310; 30; 20 INJECTION, SOLUTION INTRAVENOUS at 12:15

## 2019-12-16 RX ADMIN — MIDAZOLAM 2 MG: 1 INJECTION INTRAMUSCULAR; INTRAVENOUS at 12:15

## 2019-12-16 RX ADMIN — ROCURONIUM BROMIDE 50 MG: 10 INJECTION INTRAVENOUS at 12:24

## 2019-12-16 RX ADMIN — PROPOFOL 120 MG: 10 INJECTION, EMULSION INTRAVENOUS at 12:24

## 2019-12-16 RX ADMIN — LIDOCAINE HYDROCHLORIDE 100 MG: 20 INJECTION, SOLUTION INFILTRATION; PERINEURAL at 12:24

## 2019-12-16 RX ADMIN — DEXAMETHASONE SODIUM PHOSPHATE 4 MG: 4 INJECTION, SOLUTION INTRA-ARTICULAR; INTRALESIONAL; INTRAMUSCULAR; INTRAVENOUS; SOFT TISSUE at 12:37

## 2019-12-16 RX ADMIN — PROPOFOL 175 MCG/KG/MIN: 10 INJECTION, EMULSION INTRAVENOUS at 12:24

## 2019-12-16 RX ADMIN — PROPOFOL 40 MG: 10 INJECTION, EMULSION INTRAVENOUS at 12:35

## 2019-12-16 RX ADMIN — SUGAMMADEX 200 MG: 100 INJECTION, SOLUTION INTRAVENOUS at 13:02

## 2019-12-16 ASSESSMENT — MIFFLIN-ST. JEOR: SCORE: 1567.25

## 2019-12-16 NOTE — ANESTHESIA POSTPROCEDURE EVALUATION
Anesthesia POST Procedure Evaluation    Patient: Angel Yanez   MRN:     [id number] Gender:   male   Age:    59 year old :      1960        Preoperative Diagnosis: Lymphadenopathy [R59.1]   Procedure(s):  flexible bronchoscopy, endobronchial ultrasound  with transbronchial needle aspiration and lavage   Postop Comments: No value filed.       Anesthesia Type:  General  General    Reportable Event: NO     PAIN: Uncomplicated   Sign Out status: Comfortable, Well controlled pain     PONV: No PONV   Sign Out status:  No Nausea or Vomiting     Neuro/Psych: Uneventful perioperative course   Sign Out Status: Preoperative baseline; Age appropriate mentation     Airway/Resp.: Uneventful perioperative course   Sign Out Status: Non labored breathing, age appropriate RR; Resp. Status within EXPECTED Parameters     CV: Uneventful perioperative course   Sign Out status: Appropriate BP and perfusion indices; Appropriate HR/Rhythm     Disposition:   Sign Out in:  PACU  Disposition:  Phase II; Home  Recovery Course: Uneventful  Follow-Up: Not required           Last Anesthesia Record Vitals:  CRNA VITALS  2019 1242 - 2019 1342      2019             Resp Rate (observed):  12          Last PACU Vitals:  Vitals Value Taken Time   /90 2019  2:00 PM   Temp 35.9  C (96.7  F) 2019  1:45 PM   Pulse 65 2019  2:00 PM   Resp 18 2019  1:45 PM   SpO2 97 % 2019  2:01 PM   Temp src     NIBP     Pulse     SpO2     Resp     Temp     Ht Rate     Temp 2     Vitals shown include unvalidated device data.      Electronically Signed By: Beba Barraza MD, 2019, 2:02 PM

## 2019-12-16 NOTE — ANESTHESIA CARE TRANSFER NOTE
Patient: Angel Yanez    Procedure(s):  flexible bronchoscopy, endobronchial ultrasound  with transbronchial needle aspiration and lavage    Diagnosis: Lymphadenopathy [R59.1]  Diagnosis Additional Information: No value filed.    Anesthesia Type:   General     Note:      Comments: Anesthesia Care Transfer Note    Patient: Angel Yanez    Transferred to: PACU with supplemental O2    Patient vital signs: stable    Airway: none    Monitors on, VSS, breathing spontaneously, report to EULALIO Allen CRNA   12/16/2019 1:15 PM      Vitals: (Last set prior to Anesthesia Care Transfer)    CRNA VITALS  12/16/2019 1242 - 12/16/2019 1315      12/16/2019             Resp Rate (observed):  12                Electronically Signed By: BONITA Phipps CRNA  December 16, 2019  1:15 PM

## 2019-12-16 NOTE — OR NURSING
Dr. Escudero paged and call returned by OR nurse who relayed message, MD made aware that Platelet count was 7 on 12/15/19; patient received Platelet transfusion at Infusion Center. Plan to recheck platelets and will follow-up with MD upon results.

## 2019-12-16 NOTE — OR NURSING
Discharge instructions given to wife aisha over the phone, son Pat here to  patient. All questions answered.

## 2019-12-16 NOTE — DISCHARGE INSTRUCTIONS
Post-Bronchoscopy Patient Instructions:    December 16, 2019  Angel Yanez      Your procedure (bronchoscopy with lymph node biopsies) was completed without any immediate complications.      You may cough up scant amount of blood for the next 12-24 hours. If you have excessive cough with blood, chest pain, shortness of breath or other concerning symptoms, please report to the closest emergency room.      You may experience low grade (less than 100.5 F) fever next 24 hours for which you can take Tylenol. If the fever persists more than 24 hours contact our office or your primary care provider.      Our office (Pulmonary--[phone]) will call you when the results from today's procedure become available in the coming 3-5 business days.      You  resume your regular diet as it was prior to procedure.      Should you have any question, please do not hesitate to call our office.      Tom Patino MD, 12/16/2019, 1:14 PM  Interventional Pulmonology Fellow  Department of Pulmonary, Allergy, Critical Care & Sleep Medicine     [facility]  Same-Day Surgery   Adult Discharge Orders & Instructions     For 24 hours after surgery    1. Get plenty of rest.  A responsible adult must stay with you for at least 24 hours after you leave the hospital.   2. Do not drive or use heavy equipment.  If you have weakness or tingling, don't drive or use heavy equipment until this feeling goes away.  3. Do not drink alcohol.  4. Avoid strenuous or risky activities.  Ask for help when climbing stairs.   5. You may feel lightheaded.  IF so, sit for a few minutes before standing.  Have someone help you get up.   6. If you have nausea (feel sick to your stomach): Drink only clear liquids such as apple juice, ginger ale, broth or 7-Up.  Rest may also help.  Be sure to drink enough fluids.  Move to a regular diet as you feel able.  7. You may have a slight fever. Call the doctor if your fever is over  100 F (37.7 C) (taken under the tongue) or lasts longer than 24 hours.  8. You may have a dry mouth, a sore throat, muscle aches or trouble sleeping.  These should go away after 24 hours.  9. Do not make important or legal decisions.   Call your doctor for any of the followin.  Signs of infection (fever, growing tenderness at the surgery site, a large amount of drainage or bleeding, severe pain, foul-smelling drainage, redness, swelling).    2. It has been over 8 to 10 hours since surgery and you are still not able to urinate (pass water).    3.  Headache for over 24 hours.    To contact a doctor, call dr. Escudero's clinic at [phone]  or:     X [phone] and ask for the resident on call for pulmonary (answered 24 hours a day)   X Emergency Department: [facility]: [phone]       (TTY for hearing impaired: [phone])

## 2019-12-16 NOTE — PROCEDURES
INTERVENTIONAL PULMONOLOGY       Procedure(s):    A flexible bronchoscopy  Airway exam  BAL  EBUS-TBNA (3 sites)  Therapeutic suctioning (1 site)    Procedure Date: 12/16/2019    Indication:  Lymphadenopathy     Attending of Record:  Kyaw Escudero MD    Interventional Pulmonary Fellow   Tom Patino MD     Trainees Present:   None    Medications:    General Anesthesia - See anesthesia flowsheet for details    Sedation Time:   Per Anesthesia Care Provider    Time Out:  Performed    The patient's medical record has been reviewed.  The indication for the procedure was reviewed.  The necessary history and physical examination was performed and reviewed.  The risks, benefits and alternatives of the procedure were discussed with the the patient in detail and he had the opportunity to ask questions.  I discussed in particular the potential complications including risks of minor or life-threatening bleeding and/or infection, respiratory failure, vocal cord trauma / paralysis, pneumothorax, and discomfort. Sedation risks were also discussed including abnormal heart rhythms, low blood pressure, and respiratory failure. All questions were answered to the best of my ability.  Verbal and written informed consent was obtained.  The proposed procedure and the patient's identification were verified prior to the procedure by the physician and the nurse.    The patient was assessed for the adequacy for the procedure and to receive medications.   Mental Status:  Alert and oriented x 3  Mouth/Opening: Full  TM distance: > 6 cm  Pulmonary:  Clear to ausculation bilaterally  CV:  RRR, no murmurs or gallops  ASA Grade:  (III)  Severe systemic disease    After clinical evaluation and reviewing the indication, risks, alternatives and benefits of the procedure the patient was deemed to be in satisfactory condition to undergo the procedure.      A Tuberculosis risk assessment was performed:  The patient has no known RISK of  Tuberculosis    The procedure was performed in a negative airflow room: The patient could not be moved to a negative airflow room because of needed OR for the procedure    Maneuvers / Procedure:    A Flexible  bronchoscope was used for the procedure. The patient was orally intubated with a # 8.5 ETT and the flexible scope was passed through.      Airway Examination: A complete airway examination was performed from the distal trachea to the subsegmental level in each lobe of both lungs.  Pertinent findings include normal anatomy, no endobronchial disease or lesions identified.         BAL: The bronchoscope was wedged, saline instilled a cellular fluid aspirated. This was sent for analysis.     EBUS-TBNA: The EBUS scope was inserted and biopsies were obtained from:  1. Station 7 with 5 passes, samples obtained with JOJO present; lymphocytes identified. One pass sent for culture  2. Station 4R with 4 passes, samples obtained with JOJO present; lymphocytes identified.   3. Station 11L with 3 passes, samples obtained with JOJO absent; abundant material collected for path    Therapeutic suctioning: 15-20 min of operative time was spent clearing out the airway of debris, blood and mucous prior to the   intervention.     Any disposable equipment was visually inspected and deemed to be intact immediately post procedure.      Relevant Pictures  None    Recommendations:     Successful BAL and EBUS-TBNA of stations 4R, 7 and 11L    Await pending BAL results, await pending TBNA cytology, await pending TBNA culture data    Continued follow-up with heme/onc as arranged          Tom Patino MD, 12/16/2019, 1:08 PM  Interventional Pulmonology Fellow  Department of Pulmonary, Allergy, Critical Care & Sleep Medicine   Pager: [phone]

## 2019-12-16 NOTE — ANESTHESIA PREPROCEDURE EVALUATION
Anesthesia Pre-Procedure Evaluation    Patient: Angel Yanez   MRN:     [id number] Gender:   male   Age:    58 year old :      1960        Preoperative Diagnosis: Multiple Myeloma   Procedure(s):  Bone Marrow [place]     Past Medical History:   Diagnosis Date     GERD (gastroesophageal reflux disease)      H/O autologous stem cell transplant (H) 2005     Hyperlipidemia      Multiple myeloma (H)      PONV (postoperative nausea and vomiting)      Psoriasis      Psoriatic arthritis (H)       Past Surgical History:   Procedure Laterality Date     ARTHROPLASTY HIP       BIOPSY LYMPH NODE AXILLA N/A 11/15/2019    Procedure: Right axillary lymph node biopsy;  Surgeon: Reese Irving MD;  Location: [facility] OR     COLONOSCOPY       HERNIA REPAIR       IR CVC TUNNEL PLACEMENT > 5 YRS OF AGE  2019     IR CVC TUNNEL PLACEMENT > 5 YRS OF AGE  2019     IR CVC TUNNEL REMOVAL LEFT  2019     IR CVC TUNNEL REMOVAL RIGHT  2019     IR FOLLOW UP VISIT OUTPATIENT  2019     IR LYMPH NODE BIOPSY  10/18/2019     ORTHOPEDIC SURGERY       PROCURE BONE MARROW N/A 2019    Procedure: Bone Marrow [place];  Surgeon: Antwan Erickson MD;  Location: [facility] OR     TRANSPLANT            Anesthesia Evaluation     . Pt has had prior anesthetic.     History of anesthetic complications   - PONV        ROS/MED HX    ENT/Pulmonary:  - neg pulmonary ROS     Neurologic:  - neg neurologic ROS     Cardiovascular:  - neg cardiovascular ROS   (+) ----. : . . . :. . Previous cardiac testing Echod[place]:results:Interpretation Summary  Left and right ventricular function, chamber size, wall motion, and wall  thickness are normal.The LVEF is 55%.  Global peak LV longitudinal strain is averaged at -18.9%. This is within  reported normal limits (normal <-18%). The clinical value of assessment of  global peak longitudinal strain in the surveillance for cardiotoxicity lies in  serial measurements.  No significant  valvular abnormalities were noted.  This study was compared with the study from 1/16/19: There has been no change.date: results: date: results: date: results:          METS/Exercise Tolerance:     Hematologic:  - neg hematologic  ROS       Musculoskeletal:         GI/Hepatic:     (+) GERD Asymptomatic on medication,       Renal/Genitourinary:  - ROS Renal section negative       Endo:     (+) Chronic steroid usage for .      Psychiatric:     (+) psychiatric history depression      Infectious Disease:  - neg infectious disease ROS       Malignancy:   (+) Malignancy History of Other  Other CA multiple myeloma Remission status post         Other:                         PHYSICAL EXAM:   Mental Status/Neuro: A/A/O   Airway: Facies: Feasible  Mouth/Opening: Full  TM distance: > 6 cm   Respiratory: Auscultation: CTAB     Resp. Rate: Normal     Resp. Effort: Normal      CV: Rhythm: Regular  Heart: Normal Sounds  Edema: None   Comments:      Dental: Details                  Lab Results   Component Value Date    WBC 3.5 (L) 12/16/2019    HGB 9.4 (L) 12/16/2019    HCT 28.5 (L) 12/16/2019    PLT 69 (L) 12/16/2019    .0 (H) 11/10/2019     (H) 11/10/2019     12/13/2019    POTASSIUM 3.4 12/13/2019    CHLORIDE 107 12/13/2019    CO2 27 12/13/2019    BUN 32 (H) 12/13/2019    CR 0.79 12/13/2019     (H) 12/13/2019    ZHEN 8.8 12/13/2019    PHOS 3.5 11/06/2019    MAG 2.1 11/26/2019    ALBUMIN 2.3 (L) 12/13/2019    PROTTOTAL 5.3 (L) 12/13/2019    ALT 21 12/13/2019    AST 5 12/13/2019    ALKPHOS 261 (H) 12/13/2019    BILITOTAL 1.5 (H) 12/13/2019    PTT 47 (H) 12/09/2019    INR 1.53 (H) 12/09/2019    FIBR 672 (H) 12/09/2019    TSH 0.65 10/29/2019    T4 1.22 02/14/2007       Preop Vitals  BP Readings from Last 3 Encounters:   12/16/19 (!) 170/93   12/15/19 (!) 160/90   12/13/19 138/81    Pulse Readings from Last 3 Encounters:   12/16/19 68   12/15/19 78   12/13/19 82      Resp Readings from Last 3 Encounters:  "  12/16/19 16   12/15/19 16   12/13/19 16    SpO2 Readings from Last 3 Encounters:   12/16/19 95%   12/15/19 96%   12/13/19 96%      Temp Readings from Last 1 Encounters:   12/16/19 36.6  C (97.8  F) (Oral)    Ht Readings from Last 1 Encounters:   12/16/19 1.778 m (5' 10\")      Wt Readings from Last 1 Encounters:   12/16/19 75.6 kg (166 lb 10.7 oz)    Estimated body mass index is 23.91 kg/m  as calculated from the following:    Height as of an earlier encounter on 12/16/19: 1.778 m (5' 10\").    Weight as of an earlier encounter on 12/16/19: 75.6 kg (166 lb 10.7 oz).     LDA:  Peripheral IV 12/15/19 Right (Active)   Site Assessment WDL 12/15/2019  8:36 AM   Line Status Saline locked 12/15/2019  8:36 AM   Phlebitis Scale 0-->no symptoms 12/15/2019  8:36 AM   Number of days: 1       Peripheral IV 12/16/19 Left Upper forearm (Active)   Number of days: 0       AVAILABLE (Active)   Number of days:             Assessment: Elective due to   ASA SCORE: 3    H&P: History and physical reviewed and following examination; no interval change.        - H&P complete; Preop Testing complete; Consents complete  Smoking Status:  NO Active use of Tobacco/UNKNOWN Tobacco use status   NPO Status: NPO Appropriate     Plan:   Anes. Type:  General (General)   Pre-Medication: Midazolam   Induction:  IV (Standard)   Airway: ETT; Oral   Access/Monitoring: PIV   Maintenance: Balanced     Postop Plan:   Postop Pain: Opioids  Postop Sedation/Airway: Not planned  Disposition: Outpatient     PONV Management:   Adult Risk Factors:, H/o PONV or Motion Sickness, Non-Smoker, Postop Opioids   Prevention: Ondansetron, Dexamethasone     CONSENT: Direct conversation   Plan and risks discussed with: Patient                                Beba Barraza MD  "

## 2019-12-17 ENCOUNTER — APPOINTMENT (OUTPATIENT)
Dept: LAB | Facility: CLINIC | Age: 59
End: 2019-12-17
Attending: INTERNAL MEDICINE
Payer: COMMERCIAL

## 2019-12-17 ENCOUNTER — OFFICE VISIT (OUTPATIENT)
Dept: INFECTIOUS DISEASES | Facility: CLINIC | Age: 59
End: 2019-12-17
Attending: INTERNAL MEDICINE
Payer: COMMERCIAL

## 2019-12-17 ENCOUNTER — ONCOLOGY VISIT (OUTPATIENT)
Dept: TRANSPLANT | Facility: CLINIC | Age: 59
End: 2019-12-17
Attending: INTERNAL MEDICINE
Payer: COMMERCIAL

## 2019-12-17 VITALS
TEMPERATURE: 97.7 F | DIASTOLIC BLOOD PRESSURE: 95 MMHG | BODY MASS INDEX: 24.02 KG/M2 | SYSTOLIC BLOOD PRESSURE: 167 MMHG | WEIGHT: 167.4 LBS | HEART RATE: 82 BPM | OXYGEN SATURATION: 99 %

## 2019-12-17 VITALS
SYSTOLIC BLOOD PRESSURE: 164 MMHG | RESPIRATION RATE: 16 BRPM | WEIGHT: 167.4 LBS | TEMPERATURE: 97.7 F | DIASTOLIC BLOOD PRESSURE: 92 MMHG | HEART RATE: 89 BPM | BODY MASS INDEX: 24.02 KG/M2 | OXYGEN SATURATION: 99 %

## 2019-12-17 DIAGNOSIS — R59.1 LYMPHADENOPATHY: ICD-10-CM

## 2019-12-17 DIAGNOSIS — R21 RASH: ICD-10-CM

## 2019-12-17 DIAGNOSIS — L40.50 PSORIASIS WITH ARTHROPATHY (H): ICD-10-CM

## 2019-12-17 DIAGNOSIS — C90.00 MULTIPLE MYELOMA NOT HAVING ACHIEVED REMISSION (H): Primary | ICD-10-CM

## 2019-12-17 DIAGNOSIS — C90.01 MULTIPLE MYELOMA IN REMISSION (H): ICD-10-CM

## 2019-12-17 DIAGNOSIS — R50.9 FUO (FEVER OF UNKNOWN ORIGIN): Primary | ICD-10-CM

## 2019-12-17 LAB
BACTERIA SPEC CULT: NO GROWTH
CMV DNA SPEC NAA+PROBE-ACNC: NORMAL [IU]/ML
CMV DNA SPEC NAA+PROBE-LOG#: NORMAL {LOG_IU}/ML
COPATH REPORT: NORMAL
FLUAV H1 2009 PAND RNA SPEC QL NAA+PROBE: NEGATIVE
FLUAV H1 2009 PAND RNA SPEC QL NAA+PROBE: NORMAL
FLUAV H1 RNA SPEC QL NAA+PROBE: NEGATIVE
FLUAV H1 RNA SPEC QL NAA+PROBE: NORMAL
FLUAV H3 RNA SPEC QL NAA+PROBE: NEGATIVE
FLUAV H3 RNA SPEC QL NAA+PROBE: NORMAL
FLUAV RNA SPEC QL NAA+PROBE: NEGATIVE
FLUAV RNA SPEC QL NAA+PROBE: NORMAL
FLUBV RNA SPEC QL NAA+PROBE: NEGATIVE
FLUBV RNA SPEC QL NAA+PROBE: NORMAL
HADV DNA SPEC QL NAA+PROBE: NEGATIVE
HADV DNA SPEC QL NAA+PROBE: NEGATIVE
HADV DNA SPEC QL NAA+PROBE: NORMAL
HADV DNA SPEC QL NAA+PROBE: NORMAL
HMPV RNA SPEC QL NAA+PROBE: NEGATIVE
HMPV RNA SPEC QL NAA+PROBE: NORMAL
HPIV1 RNA SPEC QL NAA+PROBE: NEGATIVE
HPIV1 RNA SPEC QL NAA+PROBE: NORMAL
HPIV2 RNA SPEC QL NAA+PROBE: NEGATIVE
HPIV2 RNA SPEC QL NAA+PROBE: NORMAL
HPIV3 RNA SPEC QL NAA+PROBE: NEGATIVE
HPIV3 RNA SPEC QL NAA+PROBE: NORMAL
MICROBIOLOGIST REVIEW: NORMAL
MICROBIOLOGIST REVIEW: NORMAL
RHINOVIRUS RNA SPEC QL NAA+PROBE: NEGATIVE
RHINOVIRUS RNA SPEC QL NAA+PROBE: NORMAL
RSV RNA SPEC QL NAA+PROBE: NEGATIVE
RSV RNA SPEC QL NAA+PROBE: NEGATIVE
RSV RNA SPEC QL NAA+PROBE: NORMAL
RSV RNA SPEC QL NAA+PROBE: NORMAL
SPECIMEN SOURCE: NORMAL

## 2019-12-17 PROCEDURE — 94642 AEROSOL INHALATION TREATMENT: CPT

## 2019-12-17 PROCEDURE — 25000125 ZZHC RX 250: Mod: ZF | Performed by: PHYSICIAN ASSISTANT

## 2019-12-17 PROCEDURE — G0463 HOSPITAL OUTPT CLINIC VISIT: HCPCS | Mod: 25,27

## 2019-12-17 PROCEDURE — G0463 HOSPITAL OUTPT CLINIC VISIT: HCPCS | Mod: ZF

## 2019-12-17 RX ORDER — ALBUTEROL SULFATE 0.83 MG/ML
2.5 SOLUTION RESPIRATORY (INHALATION)
Status: DISCONTINUED | OUTPATIENT
Start: 2019-12-17 | End: 2019-12-17 | Stop reason: HOSPADM

## 2019-12-17 RX ORDER — HEPARIN SODIUM,PORCINE 10 UNIT/ML
5 VIAL (ML) INTRAVENOUS
Status: CANCELLED | OUTPATIENT
Start: 2019-12-18

## 2019-12-17 RX ORDER — ALBUTEROL SULFATE 5 MG/ML
2.5 SOLUTION RESPIRATORY (INHALATION)
Status: CANCELLED
Start: 2019-12-18

## 2019-12-17 RX ORDER — PENTAMIDINE ISETHIONATE 300 MG/300MG
300 INHALANT RESPIRATORY (INHALATION)
Status: CANCELLED
Start: 2019-12-18

## 2019-12-17 RX ADMIN — PENTAMIDINE ISETHIONATE 300 MG: 300 INJECTION, POWDER, LYOPHILIZED, FOR SOLUTION INTRAMUSCULAR; INTRAVENOUS at 12:56

## 2019-12-17 RX ADMIN — ALBUTEROL SULFATE 2.5 MG: 2.5 SOLUTION RESPIRATORY (INHALATION) at 12:48

## 2019-12-17 ASSESSMENT — PAIN SCALES - GENERAL
PAINLEVEL: NO PAIN (0)
PAINLEVEL: NO PAIN (0)

## 2019-12-17 NOTE — NURSING NOTE
"Oncology Rooming Note    December 17, 2019 12:17 PM   Angel Yanez is a 59 year old male who presents for:    Chief Complaint   Patient presents with     Blood Draw     labs drawn via vpt by rn. vs taken      RECHECK     Pt here for routine visit with Provider and Labs. Pt is s/p BMT for Multiple Myeloma.      Initial Vitals: BP (!) 164/92 (BP Location: Right arm, Patient Position: Sitting, Cuff Size: Adult Regular)   Pulse 89   Temp 97.7  F (36.5  C) (Oral)   Resp 16   Wt 75.9 kg (167 lb 6.4 oz)   SpO2 99%   BMI 24.02 kg/m   Estimated body mass index is 24.02 kg/m  as calculated from the following:    Height as of 12/16/19: 1.778 m (5' 10\").    Weight as of this encounter: 75.9 kg (167 lb 6.4 oz). Body surface area is 1.94 meters squared.  No Pain (0) Comment: Data Unavailable   No LMP for male patient.  Allergies reviewed: Yes  Medications reviewed: Yes    Medications: Medication refills not needed today.  Pharmacy name entered into EPIC:    [facility] - [place], [place] - [address]  [facility] - [place], [place] - [address]    Clinical concerns: JUAN MANUEL Coker RN              "

## 2019-12-17 NOTE — NURSING NOTE
Patient was first given Albuterol 2.5 mg nebulizer, after which Pentamadine 300 mg mixed with 6cc Sterile H2O was administered through a filtered nebulizer.  No adverse side effects noted by the patient.    Shoshana Salazar MA

## 2019-12-17 NOTE — PROGRESS NOTES
[facility]    Transplant Infectious Diseases Outpatient Progress note      Patient:  Angel Yanez, Date of birth 1960, Medical record number [id number]  Date of Visit:  12/17/2019           Recommendations:   1. If the mediastinal LN bx is not diagnostic and no auto-immune disease is identified, we should consider drug-induced rash and drug-induced fever.    - an azole is reasonable to consider as the source of fever and rash since he's been on fluconazole then itraconazole, then fluconazole again since HSCT.   - a trial of stopping azoles and prednisone can be attempted.   2. Due for pentamidine while on high dose steroids.     RTC: one month.         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         Active Problems and Infectious Diseases Issues:   1. FUO with LA.  The patient underwent EBUS-guided mediastinal LN bx.  If this biopsy is again not diagnostic, I would start considering drug-induced fever. The culprit that is persistently used since HSCT is one of the azoles, we can attempt a trial of stopping prednisone and fluconazole.      Extensive infectious workup has been negative.  Core LN biopsy 10/18/2019 not diagnostic, it was sent for universal PCR which was negative for bacterial/fungal/mycobacterial PCR.  LN excisional biopsy was not diagnostic (except for ? Drug reaction).    Both Gram negative pathogens (from LN cx 11/15/2019) of Ochrobactrum anthropi and S maltohplilia growing only in the broth are not likely to be significant and not likely to account for the patient's FUO. The fever recurred after longer than one month of itraconazole and 2 weeks of levaquin anyway. (Received levaquin 11/26/2019 till 12/9/2019 and itraconaozole mid October till 12/9/2019 with therapeutic levels between 2.1 and 3.4).         Old Problems and Infectious Diseases Issues:   1. P aeruginosa BSI due  to line infection treated with cefepime and removal of the line.      Other Infectious Disease issues include:  - fluconazole after HSCT, followed with itraconazole starting 10/18/2019 with therapeutic levels as of 11/12/2019 of 2.1 and 3.4 as of 11/14/2019. Fluconazole was substituted for itraconazole on 12/11/2019  - PCP prophylaxis: pentamidine, last dose 11/17/2019.   - Serostatus: CMV+, EBV+, HSV1-/2+, VZV ?      Attestation:  I interviewed the patient and obtained history from the patient, and by reviewing the patient's chart including outside records, microbiological data, and radiological data. All data are summarized in this notes.  Collin Albarado MD   Pager: [phone]  12/17/2019         Interim History:   Since I last saw the patient he remained on celebrex, levaquin and itraconazole till 12/9/2019 when he presented to Hem/Onc clinic with fever of 104. Celebrex/itraconazole/levaquin were all discontinued. He was started on prednisone 30 mg daily on 12/11/2019.   Currently has no fever, no other complaints except disseminated rash that is improving with triamcinolone.     He underwent EBUS with mediastinal LNs biopsies on 12/16/2019.         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 He was hospitalized on multiple occasions in 8/2019, 9/2019, 10/2019, 11/2019 for FUO. An extensive wokrup was negative except for CT showing and generalized LA.   The positive C pneumoniae at very low level of 1:64 represents prior exposure. Brucella, Bartonella, Coxiella, Mycoplasma, Rickettsiae serology negative.   Babesia/Anapalasma/Ehrlichia PCR negative.   Coccidioides/Histoplasma Ag, BG glucan, A galactomannan, CRAG  were negative.   KARIUS metagenomic test negative  AFB blood cx negative.   HHV-8 PCR in blood negative.     Core LN biopsy 10/18/2019 not diagnostic, it was sent for universal PCR which was negative for bacterial/fungal/mycobacterial  PCR. The patient was started on empirically on itraconazole but there was a question whether the patient filled his prescription.   When he was readmitted in 11/2019, an excisional LN biopsy 11/15/2019 also not diagnostic, was sent for bacterial, fungal cx grew Ochrobactrum anthropi and S maltohplilia in broth only of unknown significance. He was started empirically on levaquin on 11/26/2019 and itraconazole was continued (start day is not clear, late 10/2019 or early 11/2019 with therapeutic levels between 2.1-3.4 as of 11/10/2019 and 11/12/2019, respectively).              Past Medical History:     Past Medical History:   Diagnosis Date     GERD (gastroesophageal reflux disease)      H/O autologous stem cell transplant (H) 02/2005     Hyperlipidemia      Multiple myeloma (H) 2004     PONV (postoperative nausea and vomiting)      Psoriasis      Psoriatic arthritis (H)              Past Surgical History:     Past Surgical History:   Procedure Laterality Date     ARTHROPLASTY HIP       BIOPSY LYMPH NODE AXILLA N/A 11/15/2019    Procedure: Right axillary lymph node biopsy;  Surgeon: Reese Irving MD;  Location: [facility] OR     COLONOSCOPY       HERNIA REPAIR       IR CVC TUNNEL PLACEMENT > 5 YRS OF AGE  1/22/2019     IR CVC TUNNEL PLACEMENT > 5 YRS OF AGE  5/16/2019     IR CVC TUNNEL REMOVAL LEFT  2/20/2019     IR CVC TUNNEL REMOVAL RIGHT  7/23/2019     IR FOLLOW UP VISIT OUTPATIENT  1/24/2019     IR LYMPH NODE BIOPSY  10/18/2019     ORTHOPEDIC SURGERY       PROCURE BONE MARROW N/A 5/14/2019    Procedure: Bone Marrow [place];  Surgeon: Antwan Erickson MD;  Location: [facility] OR     TRANSPLANT                 Social History:     Social History     Tobacco Use     Smoking status: Former Smoker     Smokeless tobacco: Never Used   Substance Use Topics     Alcohol use: Yes     Comment: occasionaly             Family History:   I have reviewed this patient's family history          Immunizations:     Immunization  History   Administered Date(s) Administered     Influenza (IIV3) PF 12/20/2013     Influenza Vaccine IM > 6 months Valent IIV4 11/26/2019     Influenza Vaccine, 3 YRS +, IM (QUADRIVALENT W/PRESERVATIVES) 10/08/2015, 10/26/2016, 11/04/2017     TD (ADULT, 7+) 12/11/1996     TDAP Vaccine (Boostrix) 10/08/2015, 07/15/2019     Tetanus 12/11/1996             Allergies:     Allergies   Allergen Reactions     Avalide Hives     Chlorhexidine Itching     Chloroxylenol Rash     [employer]icare solution     Lorazepam Hives     Moxifloxacin Hives             Medications:     Current Outpatient Medications   Medication Sig     acetaminophen (TYLENOL) 325 MG tablet Take 1-2 tablets (325-650 mg) by mouth every 4 hours as needed for fever     acyclovir (ZOVIRAX) 800 MG tablet Take 1 tablet (800 mg) by mouth 2 times daily     calcium carbonate (CALCIUM CARBONATE) 600 MG tablet Take 2 tablets by mouth daily      Cholecalciferol (VITAMIN D3) 2000 units CAPS Take 2,000 Units by mouth daily      fluconazole (DIFLUCAN) 200 MG tablet Take 1 tablet (200 mg) by mouth daily     PARoxetine (PAXIL) 20 MG tablet Take 1 tablet (20 mg) by mouth every morning     pentamidine (NEBUPENT) 300 MG neb solution Inhale 300 mg into the lungs every 28 days Will get in BMT clinic. Next due Monday 12/17/19     predniSONE (DELTASONE) 10 MG tablet Take 3 tablets (30 mg) by mouth daily     triamcinolone (KENALOG) 0.1 % external cream Apply topically 2 times daily     No current facility-administered medications for this visit.             Review of Systems:   As mentioned in the interim history otherwise negative by reviewing constitutional symptoms, central and peripheral neurological systems, respiratory system, cardiac system, GI system,  system, musculoskeletal, skin, allergy, and lymphatics.                  Physical Exam:     Vitals:    12/17/19 1350   BP: (!) 167/95   BP Location: Right arm   Patient Position: Sitting   Cuff Size: Adult Regular   Pulse: 82    Temp: 97.7  F (36.5  C)   TempSrc: Oral   SpO2: 99%   Weight: 75.9 kg (167 lb 6.4 oz)      Wt Readings from Last 4 Encounters:   12/16/19 75.6 kg (166 lb 10.7 oz)   12/15/19 78.9 kg (174 lb)   12/13/19 81.2 kg (179 lb)   12/11/19 81.4 kg (179 lb 8 oz)     Constitutional: awake, alert, cooperative, no apparent distress and appears at stated age, well nourished.   Neurologic: Patient is moving all extremities without focal deficit, no focal sensory loss.   Lungs: CTA bilaterally, no accessory muscle use, no dullness to percussion and no abnormal tactile fremitus.   CVS: RRR, normal S1/S2, PMI was not displaced.   Abdomen: non-tender, non-distended, no masses, no bruit, no shifting dullness, normal BS.   Skin: no induration, fluctuation or discharge, but has papular/pustular/erythematous rash on torso and to lesser extent the upper extremities.               Laboratory Data:     Absolute CD4   Date Value Ref Range Status   12/10/2019 128 (L) 441 - 2,156 cells/uL Final   11/12/2019 146 (L) 441 - 2,156 cells/uL Final       Inflammatory Markers    Recent Labs   Lab Test 11/10/19  0413 09/25/19  1022   *  --    .0* 160.0*       Immune Globulin Studies      Recent Labs   Lab Test 11/13/19  0440 11/06/19  1051 10/16/19  0930 09/25/19  1018 08/23/19  1301 06/11/19  0918 05/06/19  0936 01/29/19  0810 01/14/19  0945    1,210 920 507* 824 572* 636* 826 864   IGM  --  31*  --   --  25* 20* 26* 43* 50*   IGE  --   --   --   --   --   --  3  --  6   IGA  --  80  --   --  15* 41* 45* 311 327   IGG1 424  --   --   --   --   --   --   --   --    IGG2 156*  --   --   --   --   --   --   --   --    IGG3 58  --   --   --   --   --   --   --   --    IGG4 13  --   --   --   --   --   --   --   --        Metabolic Studies    Recent Labs   Lab Test 12/13/19  0920 12/12/19  0345 12/11/19  0229 12/11/19  0222 12/10/19  1653 12/10/19  0453 12/10/19  0026 12/09/19  2138 12/09/19  0924  11/26/19  0825  11/06/19  1051   10/20/19  0359    135  --  133 132* 131*  --  132* 127*   < > 141   < > 139   < > 136   POTASSIUM 3.4 3.7  --  3.6  --  3.7  --  3.4 4.0   < > 4.2   < > 3.9   < > 3.0*   CHLORIDE 107 103  --  99  --  99  --  97 94   < > 110*   < > 107   < > 105   CO2 27 26  --  23  --  23  --  25 22   < > 28   < > 27   < > 23   ANIONGAP 6 7  --  10  --  9  --  10 12   < > 3   < > 6   < > 8   BUN 32* 42*  --  46*  --  50*  --  50* 42*   < > 17   < > 12   < > 16   CR 0.79 1.00  --  1.50* 1.78* 2.22*  --  2.57* 2.26*   < > 0.72   < > 0.81   < > 0.79   GFRESTIMATED >90 81  --  49* 40* 31*  --  26* 30*   < > >90   < > >90   < > >90   * 121*  --  103*  --  108*  --  128* 146*   < > 82   < > 92   < > 92   ZHEN 8.8 8.6  --  8.3*  --  7.9*  --  8.6 9.1   < > 8.9   < > 9.0   < > 8.1*   PHOS  --   --   --   --   --   --   --   --   --   --   --   --  3.5  --  3.2   MAG  --   --   --   --   --   --   --   --   --   --  2.1  --  2.2  --  1.9   LACT  --   --  1.6  --   --   --  1.6  --   --   --   --    < >  --    < >  --     < > = values in this interval not displayed.       Hepatic Studies    Recent Labs   Lab Test 12/13/19 0920 12/12/19 0345 12/09/19 2138 12/09/19 0924 12/01/19  0818 11/26/19  0825  11/11/19  1744 11/10/19  0413   BILITOTAL 1.5* 1.4* 1.8* 1.7* 0.9 0.8   < >  --  0.8   ALKPHOS 261* 345* 190* 224* 103 114   < >  --  101   PROTTOTAL 5.3* 5.1* 5.7* 6.2* 7.2 6.8   < >  --  6.0*   ALBUMIN 2.3* 2.1* 2.5* 2.5* 3.6 3.5   < >  --  2.6*   AST 5 7 4 <3 11 9   < >  --  10   ALT 21 12 17 18 21 25   < >  --  17   LDH  --   --   --   --   --   --   --  225 282*    < > = values in this interval not displayed.       Hematology Studies     Recent Labs   Lab Test 12/16/19  1127 12/16/19  1057 12/16/19  0915 12/15/19  0832 12/13/19  0920 12/12/19  0345  12/11/19  0222 12/10/19  0453 12/09/19  2138   WBC  --   --  3.5* 3.9* 3.6* 1.6*  --  2.0* 2.0* 2.3*   ANEU  --   --   --  1.8 2.2 0.9*  --  1.4* 1.5* 1.9   ALYM  --   --   --  1.1  0.3* 0.5*  --  0.2* 0.1* 0.2*   NEGAR  --   --   --  0.1 0.4 0.1  --  0.1 0.1 0.0   AEOS  --   --   --  0.9* 0.6 0.1  --  0.3 0.3 0.1   HGB  --   --  9.4* 8.8* 9.6* 8.2*  --  7.6* 8.2* 8.2*   HCT  --   --  28.5* 26.6* 27.8* 23.9*  --  22.4* 23.5* 24.0*   PLT 70* 69* 12* 7* 9* 17*   < > 23* 11* 20*    < > = values in this interval not displayed.       Clotting Studies    Recent Labs   Lab Test 12/09/19  1828 11/15/19  1256 11/14/19  0404 11/12/19  0318 11/09/19  1800 10/16/19  1938 09/24/19  0149   INR 1.53* 1.17* 1.24* 1.27* 1.40* 1.27* 1.19*   PTT 47*  --   --   --  49* 43* 42*       Urine Studies    Recent Labs   Lab Test 12/10/19  1545 11/10/19  1410 11/08/19  1148 09/25/19  0822 09/23/19  1757   URINEPH 5.5 6.0 5.0 7.5* 6.0   NITRITE Negative Negative Negative Negative Negative   LEUKEST Negative Negative Negative Negative Negative   WBCU 1 2 3 <1 1         Microbiology:  Last 6 Culture results with specimen source  Culture Micro   Date Value Ref Range Status   12/16/2019 PENDING  Preliminary   12/16/2019 Culture negative monitoring continues  Preliminary   12/16/2019 Culture negative monitoring continues  Preliminary   12/16/2019 PENDING  Preliminary   12/16/2019 PENDING  Preliminary   12/16/2019 Culture negative monitoring continues  Preliminary    Specimen Description   Date Value Ref Range Status   12/16/2019 Fluid station 7 FNA specimen 2  Final   12/16/2019 Fluid station 7 FNA specimen 2  Final   12/16/2019 Bronchial lavage Right middle lobe  Final   12/16/2019 Bronchial lavage Right middle lobe  Final   12/16/2019 Bronchial lavage Right middle lobe  Final   12/16/2019 Bronchial lavage Right middle lobe  Final   12/16/2019 Bronchial lavage Right middle lobe  Final   12/16/2019 Bronchial lavage Right middle lobe  Final        Last check of C difficile  C Diff Toxin B PCR   Date Value Ref Range Status   12/09/2019 Negative NEG^Negative Final     Comment:     Negative: C. difficile target DNA sequences NOT  detected, presumed negative   for C.difficile toxin B or the number of bacteria present may be below the   limit of detection for the test.  FDA approved assay performed using [employer] GeneXpert real-time PCR.  A negative result does not exclude actual disease due to Clostridium difficile   and may be due to improper collection, handling and storage of the specimen   or the number of organisms in the specimen is below the detection limit of the   assay.           Virology:  CMV viral loads    CMV viral loads  No results found for: 27979, 27173, 30417, 02743  CMV viral loads    Recent Labs   Lab Test 11/12/19  0318 10/16/19  0930   CSPEC EDTA PLASMA Plasma, EDTA anticoagulant   CMVLOG Not Calculated Not Calculated       CMV viral loads    Log IU/mL of CMVQNT   Date Value Ref Range Status   11/12/2019 Not Calculated <2.1 [Log_IU]/mL Final   10/16/2019 Not Calculated <2.1 [Log_IU]/mL Final   10/08/2019 Not Calculated <2.1 [Log_IU]/mL Final   09/25/2019 Not Calculated <2.1 [Log_IU]/mL Final   08/23/2019 Not Calculated <2.1 [Log_IU]/mL Final   08/06/2019 Not Calculated <2.1 [Log_IU]/mL Final   07/15/2019 Not Calculated <2.1 [Log_IU]/mL Final   07/08/2019 Not Calculated <2.1 [Log_IU]/mL Final   07/01/2019 Not Calculated <2.1 [Log_IU]/mL Final   06/24/2019 Not Calculated <2.1 [Log_IU]/mL Final   06/17/2019 Not Calculated <2.1 [Log_IU]/mL Final   06/07/2019 Not Calculated <2.1 [Log_IU]/mL Final   05/29/2019 Not Calculated <2.1 [Log_IU]/mL Final   05/17/2019 Not Calculated <2.1 [Log_IU]/mL Final       CMV resistance testing  No lab results found.  No results found for: CMVCID, CMVFOS, CMVGAN     EBV viral loads     Recent Labs   Lab Test 11/19/19  1236 11/12/19  0318 10/16/19  0930 10/08/19  1301 09/25/19  0810   EBRES EBV DNA Not Detected <500* 2,725* 1,203* 2,215*     EBV DNA Copies/mL   Date Value Ref Range Status   11/19/2019 EBV DNA Not Detected EBVNEG^EBV DNA Not Detected [Copies]/mL Final   11/12/2019 <500 (A)  EBVNEG^EBV DNA Not Detected [Copies]/mL Final     Comment:     EBV DNA Detected below the reportable range of 500 Copies/mL   10/16/2019 2,725 (A) EBVNEG^EBV DNA Not Detected [Copies]/mL Final   10/08/2019 1,203 (A) EBVNEG^EBV DNA Not Detected [Copies]/mL Final   09/25/2019 2,215 (A) EBVNEG^EBV DNA Not Detected [Copies]/mL Final       Human Herpes Virus 6 viral loads    HHV6 DNA Result   Date Value Ref Range Status   11/12/2019 No HHV6 DNA detected NOHHV^No HHV6 DNA detected Copies/mL Final      HHV6 DNA Specimen Type   Date Value Ref Range Status   11/12/2019 Whole blood, EDTA anticoagulant  Final       CMV IgG Antibody   Date Value Ref Range Status   01/05/2005 >160.0 EU/mL Final     Comment:     Positive for anti-CMV IgG     Herpes Simplex IgG Antibody Immune Status Ratio   Date Value Ref Range Status   02/04/2005 1.21  Final     Herpes Simplex IgG Antibody Interpretation   Date Value Ref Range Status   02/04/2005 Positive, suggests immunologic exposure.  Final     No results found for: EBIG2, EBIGM, EBVIGG, EBIGG, EBVAGN, FG5650, TOXG      Pathology:  Mediastinal LN biopsy 12/16/2019  A.  Lymph node, station 7, endobronchial ultrasound guided fine needle   aspiration:   - Polymorphous lymphocytic population present   - Negative for malignancy   Specimen Adequacy: Satisfactory for evaluation.   B.  Lymph node, 4R, endobronchial ultrasound guided fine needle   aspiration:   - Polymorphous lymphocytic population present   - Negative for malignancy   Specimen Adequacy: Satisfactory for evaluation.   C.  Lymph node, 11L, endobronchial ultrasound guided fine needle   aspiration:   Nondiagnostic specimen.   Specimen Adequacy: Unsatisfactory for evaluation.  Specimen processed and   evaluated, but unsatisfactory for   evaluation of epithelial cell abnormality due to scant cellularity.     Right axillary LN biopsy 11/15/2019  SPECIMEN(S):   Lymph node, right axillary   FINAL DIAGNOSIS:   Lymph node, right axillary,  "biopsy:     - Atypical paracortical lymphoid proliferation (see comment)     - Negative for EBV by in situ hybridization     - Negative for fungi and acid fast bacteria per GMS and AFB Angie stained    tissue, respectively     - Focal involvement by plasma cell myeloma   COMMENT:   Summary of ancillary studies:   Flow cytometric immunophenotyping: polytypic plasma cells, insufficient   cellularity for additional studies   ([id number]).   Cytogenetics: normal male karyotype, 46,XY[6] ([id number]).   Molecular: TCR gene rearrangement studies: oligoclonal ([id number]).   This is a challenging and unusual case and was sent for a second opinion   to the [facility] and the interpretation   of \"atypical paracortical lymphoid proliferation\" is reflective of Dr. Munira Dubon's expert opinion. The   consult report will be scanned into EPIC under the Media tab.   There is a diffuse mixed infiltrate that includes small lymphocytes,   histiocytes, eosinophils, plasma cells as   well as scattered immunoblasts. There is an increased density of high   endothelial venules. The lymphoid   component of the infiltrate is predominantly composed of T cells with an   admixture of CD4 and CD8 positive   cells. The possibility of a T-cell lymphoma was considered, specifically   an angioimmunoblastic T-cell   lymphoma, however the presence of many CD8-positive T cells, the largely   negative staining of TFH markers, the   absence of expansion of FDC meshworks and the lack of EBV, all argue   against the interpretation. The   oligoclonal pattern of TCR GNRs by PCR also argues against a clonal   proliferation. In summary with   consideration of the morphologic/immunophenotypic features, the ancillary   studies, the expert opinion, and the   patient's current status, this case is not felt to be representative of a   T-cell lymphoma, and a reactive   etiology, specifically a drug reaction, should be considered.      Imaging:  CT chest 12/9/2019 "   IMPRESSION:   1. Since the prior CT on 11/15/2019, the previously seen groundglass  opacities in the upper lobes have resolved. There are persistent areas  of micronodularity, greatest in the right middle lobe and lower lobes,  which may be infectious or inflammatory in etiology.  2. Trace bilateral pleural effusions, decreased in size from the prior  exam.  3. Smooth interlobular septal thickening, suggestive of pulmonary  edema.  4. Mildly enlarged mediastinal and bilateral axillary lymph nodes,  unchanged from the prior exam, which may be reactive. Right axillary  lymphocele has developed.  5. Unchanged multilevel chronic compression deformities of the  thoracic and lumbar spine.  6. Splenomegaly.  CT chest 11/15/2019   IMPRESSION:   1. New groundglass opacities greatest in the upper lobes concerning  for infection.  2. Increased prominence of the interstitium suggesting interstitial  edema and/or component of infection.  3. Small bilateral pleural effusions.  4. Mildly prominent mediastinal lymph nodes are similar to slightly  decreased from 11/9/2019. Post surgical changes of right axillary  jami dissection.    CT c/a/p 11/9/2019  IMPRESSION: In this patient with history of multiple myeloma status  post bone marrow transplant:  1. No specific findings to explain patient's fever of unknown origin.   2. There is a 1.6 cm hypoenhancing ill-defined focus in the superior  pole the right kidney. This is thought to represent sequela of prior  inflammation. Occult malignancy is thought less likely but not  entirely excluded. Recommend follow-up MRI in outpatient setting.  3. The spine is severely demineralized with multilevel compression  fractures which are stable.  4. Stable bilateral axillary, mediastinal, and superficial inguinal  lymphadenopathy.  5. Stable small peripheral ill-defined hypodensities within the  spleen, nonspecific and may be infarctions.

## 2019-12-17 NOTE — LETTER
12/17/2019     RE: Angel Yanez  [address]  [facility] [id number]     Dear Colleague,    Thank you for referring your patient, Angel Yanez, to the [facility] AND INFECTIOUS DISEASES at [facility]. Please see a copy of my visit note below.    [facility]    Transplant Infectious Diseases Outpatient Progress note      Patient:  Angel Yanez, Date of birth 1960, Medical record number [id number]  Date of Visit:  12/17/2019           Recommendations:   1. If the mediastinal LN bx is not diagnostic and no auto-immune disease is identified, we should consider drug-induced rash and drug-induced fever.    - an azole is reasonable to consider as the source of fever and rash since he's been on fluconazole then itraconazole, then fluconazole again since HSCT.   - a trial of stopping azoles and prednisone can be attempted.   2. Due for pentamidine while on high dose steroids.     RTC: one month.         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         Active Problems and Infectious Diseases Issues:   1. FUO with LA.  The patient underwent EBUS-guided mediastinal LN bx.  If this biopsy is again not diagnostic, I would start considering drug-induced fever. The culprit that is persistently used since HSCT is one of the azoles, we can attempt a trial of stopping prednisone and fluconazole.      Extensive infectious workup has been negative.  Core LN biopsy 10/18/2019 not diagnostic, it was sent for universal PCR which was negative for bacterial/fungal/mycobacterial PCR.  LN excisional biopsy was not diagnostic (except for ? Drug reaction).    Both Gram negative pathogens (from LN cx 11/15/2019) of Ochrobactrum anthropi and S maltohplilia growing only in the broth are not likely to be significant and not likely to account for the patient's FUO.  The fever recurred after longer than one month of itraconazole and 2 weeks of levaquin anyway. (Received levaquin 11/26/2019 till 12/9/2019 and itraconaozole mid October till 12/9/2019 with therapeutic levels between 2.1 and 3.4).         Old Problems and Infectious Diseases Issues:   1. P aeruginosa BSI due to line infection treated with cefepime and removal of the line.      Other Infectious Disease issues include:  - fluconazole after HSCT, followed with itraconazole starting 10/18/2019 with therapeutic levels as of 11/12/2019 of 2.1 and 3.4 as of 11/14/2019. Fluconazole was substituted for itraconazole on 12/11/2019  - PCP prophylaxis: pentamidine, last dose 11/17/2019.   - Serostatus: CMV+, EBV+, HSV1-/2+, VZV ?      Attestation:  I interviewed the patient and obtained history from the patient, and by reviewing the patient's chart including outside records, microbiological data, and radiological data. All data are summarized in this notes.  Collin Albarado MD   Pager: [phone]  12/17/2019         Interim History:   Since I last saw the patient he remained on celebrex, levaquin and itraconazole till 12/9/2019 when he presented to Hem/Onc clinic with fever of 104. Celebrex/itraconazole/levaquin were all discontinued. He was started on prednisone 30 mg daily on 12/11/2019.   Currently has no fever, no other complaints except disseminated rash that is improving with triamcinolone.     He underwent EBUS with mediastinal LNs biopsies on 12/16/2019.         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 He was hospitalized on multiple occasions in 8/2019, 9/2019, 10/2019, 11/2019 for FUO. An extensive wokrup was negative except for CT showing and generalized LA.   The positive C pneumoniae at very low level of 1:64 represents prior exposure. Brucella, Bartonella, Coxiella, Mycoplasma, Rickettsiae serology negative.   Babesia/Anapalasma/Ehrlichia PCR  negative.   Coccidioides/Histoplasma Ag, BG glucan, A galactomannan, CRAG  were negative.   KARIUS metagenomic test negative  AFB blood cx negative.   HHV-8 PCR in blood negative.     Core LN biopsy 10/18/2019 not diagnostic, it was sent for universal PCR which was negative for bacterial/fungal/mycobacterial PCR. The patient was started on empirically on itraconazole but there was a question whether the patient filled his prescription.   When he was readmitted in 11/2019, an excisional LN biopsy 11/15/2019 also not diagnostic, was sent for bacterial, fungal cx grew Ochrobactrum anthropi and S maltohplilia in broth only of unknown significance. He was started empirically on levaquin on 11/26/2019 and itraconazole was continued (start day is not clear, late 10/2019 or early 11/2019 with therapeutic levels between 2.1-3.4 as of 11/10/2019 and 11/12/2019, respectively).              Past Medical History:     Past Medical History:   Diagnosis Date     GERD (gastroesophageal reflux disease)      H/O autologous stem cell transplant (H) 02/2005     Hyperlipidemia      Multiple myeloma (H) 2004     PONV (postoperative nausea and vomiting)      Psoriasis      Psoriatic arthritis (H)              Past Surgical History:     Past Surgical History:   Procedure Laterality Date     ARTHROPLASTY HIP       BIOPSY LYMPH NODE AXILLA N/A 11/15/2019    Procedure: Right axillary lymph node biopsy;  Surgeon: Reese Irving MD;  Location: [facility] OR     COLONOSCOPY       HERNIA REPAIR       IR CVC TUNNEL PLACEMENT > 5 YRS OF AGE  1/22/2019     IR CVC TUNNEL PLACEMENT > 5 YRS OF AGE  5/16/2019     IR CVC TUNNEL REMOVAL LEFT  2/20/2019     IR CVC TUNNEL REMOVAL RIGHT  7/23/2019     IR FOLLOW UP VISIT OUTPATIENT  1/24/2019     IR LYMPH NODE BIOPSY  10/18/2019     ORTHOPEDIC SURGERY       PROCURE BONE MARROW N/A 5/14/2019    Procedure: Bone Marrow [place];  Surgeon: Antwan Erickson MD;  Location: [facility] OR     TRANSPLANT                  Social History:     Social History     Tobacco Use     Smoking status: Former Smoker     Smokeless tobacco: Never Used   Substance Use Topics     Alcohol use: Yes     Comment: occasionaly             Family History:   I have reviewed this patient's family history          Immunizations:     Immunization History   Administered Date(s) Administered     Influenza (IIV3) PF 12/20/2013     Influenza Vaccine IM > 6 months Valent IIV4 11/26/2019     Influenza Vaccine, 3 YRS +, IM (QUADRIVALENT W/PRESERVATIVES) 10/08/2015, 10/26/2016, 11/04/2017     TD (ADULT, 7+) 12/11/1996     TDAP Vaccine (Boostrix) 10/08/2015, 07/15/2019     Tetanus 12/11/1996             Allergies:     Allergies   Allergen Reactions     Avalide Hives     Chlorhexidine Itching     Chloroxylenol Rash     Technicare solution     Lorazepam Hives     Moxifloxacin Hives             Medications:     Current Outpatient Medications   Medication Sig     acetaminophen (TYLENOL) 325 MG tablet Take 1-2 tablets (325-650 mg) by mouth every 4 hours as needed for fever     acyclovir (ZOVIRAX) 800 MG tablet Take 1 tablet (800 mg) by mouth 2 times daily     calcium carbonate (CALCIUM CARBONATE) 600 MG tablet Take 2 tablets by mouth daily      Cholecalciferol (VITAMIN D3) 2000 units CAPS Take 2,000 Units by mouth daily      fluconazole (DIFLUCAN) 200 MG tablet Take 1 tablet (200 mg) by mouth daily     PARoxetine (PAXIL) 20 MG tablet Take 1 tablet (20 mg) by mouth every morning     pentamidine (NEBUPENT) 300 MG neb solution Inhale 300 mg into the lungs every 28 days Will get in BMT clinic. Next due Monday 12/17/19     predniSONE (DELTASONE) 10 MG tablet Take 3 tablets (30 mg) by mouth daily     triamcinolone (KENALOG) 0.1 % external cream Apply topically 2 times daily     No current facility-administered medications for this visit.             Review of Systems:   As mentioned in the interim history otherwise negative by reviewing constitutional symptoms, central and  peripheral neurological systems, respiratory system, cardiac system, GI system,  system, musculoskeletal, skin, allergy, and lymphatics.                  Physical Exam:     Vitals:    12/17/19 1350   BP: (!) 167/95   BP Location: Right arm   Patient Position: Sitting   Cuff Size: Adult Regular   Pulse: 82   Temp: 97.7  F (36.5  C)   TempSrc: Oral   SpO2: 99%   Weight: 75.9 kg (167 lb 6.4 oz)      Wt Readings from Last 4 Encounters:   12/16/19 75.6 kg (166 lb 10.7 oz)   12/15/19 78.9 kg (174 lb)   12/13/19 81.2 kg (179 lb)   12/11/19 81.4 kg (179 lb 8 oz)     Constitutional: awake, alert, cooperative, no apparent distress and appears at stated age, well nourished.   Neurologic: Patient is moving all extremities without focal deficit, no focal sensory loss.   Lungs: CTA bilaterally, no accessory muscle use, no dullness to percussion and no abnormal tactile fremitus.   CVS: RRR, normal S1/S2, PMI was not displaced.   Abdomen: non-tender, non-distended, no masses, no bruit, no shifting dullness, normal BS.   Skin: no induration, fluctuation or discharge, but has papular/pustular/erythematous rash on torso and to lesser extent the upper extremities.               Laboratory Data:     Absolute CD4   Date Value Ref Range Status   12/10/2019 128 (L) 441 - 2,156 cells/uL Final   11/12/2019 146 (L) 441 - 2,156 cells/uL Final       Inflammatory Markers    Recent Labs   Lab Test 11/10/19  0413 09/25/19  1022   *  --    .0* 160.0*       Immune Globulin Studies      Recent Labs   Lab Test 11/13/19  0440 11/06/19  1051 10/16/19  0930 09/25/19  1018 08/23/19  1301 06/11/19  0918 05/06/19  0936 01/29/19  0810 01/14/19  0945    1,210 920 507* 824 572* 636* 826 864   IGM  --  31*  --   --  25* 20* 26* 43* 50*   IGE  --   --   --   --   --   --  3  --  6   IGA  --  80  --   --  15* 41* 45* 311 327   IGG1 424  --   --   --   --   --   --   --   --    IGG2 156*  --   --   --   --   --   --   --   --    IGG3 58  --    --   --   --   --   --   --   --    IGG4 13  --   --   --   --   --   --   --   --        Metabolic Studies    Recent Labs   Lab Test 12/13/19  0920 12/12/19  0345 12/11/19  0229 12/11/19 0222 12/10/19  1653 12/10/19  0453 12/10/19  0026 12/09/19  2138 12/09/19  0924  11/26/19  0825  11/06/19  1051  10/20/19  0359    135  --  133 132* 131*  --  132* 127*   < > 141   < > 139   < > 136   POTASSIUM 3.4 3.7  --  3.6  --  3.7  --  3.4 4.0   < > 4.2   < > 3.9   < > 3.0*   CHLORIDE 107 103  --  99  --  99  --  97 94   < > 110*   < > 107   < > 105   CO2 27 26  --  23  --  23  --  25 22   < > 28   < > 27   < > 23   ANIONGAP 6 7  --  10  --  9  --  10 12   < > 3   < > 6   < > 8   BUN 32* 42*  --  46*  --  50*  --  50* 42*   < > 17   < > 12   < > 16   CR 0.79 1.00  --  1.50* 1.78* 2.22*  --  2.57* 2.26*   < > 0.72   < > 0.81   < > 0.79   GFRESTIMATED >90 81  --  49* 40* 31*  --  26* 30*   < > >90   < > >90   < > >90   * 121*  --  103*  --  108*  --  128* 146*   < > 82   < > 92   < > 92   ZHEN 8.8 8.6  --  8.3*  --  7.9*  --  8.6 9.1   < > 8.9   < > 9.0   < > 8.1*   PHOS  --   --   --   --   --   --   --   --   --   --   --   --  3.5  --  3.2   MAG  --   --   --   --   --   --   --   --   --   --  2.1  --  2.2  --  1.9   LACT  --   --  1.6  --   --   --  1.6  --   --   --   --    < >  --    < >  --     < > = values in this interval not displayed.       Hepatic Studies    Recent Labs   Lab Test 12/13/19  0920 12/12/19  0345 12/09/19  2138 12/09/19  0924 12/01/19  0818 11/26/19  0825  11/11/19  1744 11/10/19  0413   BILITOTAL 1.5* 1.4* 1.8* 1.7* 0.9 0.8   < >  --  0.8   ALKPHOS 261* 345* 190* 224* 103 114   < >  --  101   PROTTOTAL 5.3* 5.1* 5.7* 6.2* 7.2 6.8   < >  --  6.0*   ALBUMIN 2.3* 2.1* 2.5* 2.5* 3.6 3.5   < >  --  2.6*   AST 5 7 4 <3 11 9   < >  --  10   ALT 21 12 17 18 21 25   < >  --  17   LDH  --   --   --   --   --   --   --  225 282*    < > = values in this interval not displayed.       Hematology  Studies     Recent Labs   Lab Test 12/16/19  1127 12/16/19  1057 12/16/19  0915 12/15/19  0832 12/13/19  0920 12/12/19  0345  12/11/19  0222 12/10/19  0453 12/09/19  2138   WBC  --   --  3.5* 3.9* 3.6* 1.6*  --  2.0* 2.0* 2.3*   ANEU  --   --   --  1.8 2.2 0.9*  --  1.4* 1.5* 1.9   ALYM  --   --   --  1.1 0.3* 0.5*  --  0.2* 0.1* 0.2*   NEGAR  --   --   --  0.1 0.4 0.1  --  0.1 0.1 0.0   AEOS  --   --   --  0.9* 0.6 0.1  --  0.3 0.3 0.1   HGB  --   --  9.4* 8.8* 9.6* 8.2*  --  7.6* 8.2* 8.2*   HCT  --   --  28.5* 26.6* 27.8* 23.9*  --  22.4* 23.5* 24.0*   PLT 70* 69* 12* 7* 9* 17*   < > 23* 11* 20*    < > = values in this interval not displayed.       Clotting Studies    Recent Labs   Lab Test 12/09/19  1828 11/15/19  1256 11/14/19  0404 11/12/19  0318 11/09/19  1800 10/16/19  1938 09/24/19  0149   INR 1.53* 1.17* 1.24* 1.27* 1.40* 1.27* 1.19*   PTT 47*  --   --   --  49* 43* 42*       Urine Studies    Recent Labs   Lab Test 12/10/19  1545 11/10/19  1410 11/08/19  1148 09/25/19  0822 09/23/19  1757   URINEPH 5.5 6.0 5.0 7.5* 6.0   NITRITE Negative Negative Negative Negative Negative   LEUKEST Negative Negative Negative Negative Negative   WBCU 1 2 3 <1 1         Microbiology:  Last 6 Culture results with specimen source  Culture Micro   Date Value Ref Range Status   12/16/2019 PENDING  Preliminary   12/16/2019 Culture negative monitoring continues  Preliminary   12/16/2019 Culture negative monitoring continues  Preliminary   12/16/2019 PENDING  Preliminary   12/16/2019 PENDING  Preliminary   12/16/2019 Culture negative monitoring continues  Preliminary    Specimen Description   Date Value Ref Range Status   12/16/2019 Fluid station 7 FNA specimen 2  Final   12/16/2019 Fluid station 7 FNA specimen 2  Final   12/16/2019 Bronchial lavage Right middle lobe  Final   12/16/2019 Bronchial lavage Right middle lobe  Final   12/16/2019 Bronchial lavage Right middle lobe  Final   12/16/2019 Bronchial lavage Right middle lobe   Final   12/16/2019 Bronchial lavage Right middle lobe  Final   12/16/2019 Bronchial lavage Right middle lobe  Final        Last check of C difficile  C Diff Toxin B PCR   Date Value Ref Range Status   12/09/2019 Negative NEG^Negative Final     Comment:     Negative: C. difficile target DNA sequences NOT detected, presumed negative   for C.difficile toxin B or the number of bacteria present may be below the   limit of detection for the test.  FDA approved assay performed using [employer] GeneXpert real-time PCR.  A negative result does not exclude actual disease due to Clostridium difficile   and may be due to improper collection, handling and storage of the specimen   or the number of organisms in the specimen is below the detection limit of the   assay.           Virology:  CMV viral loads    CMV viral loads  No results found for: 05990, 74179, 67200, 06113  CMV viral loads    Recent Labs   Lab Test 11/12/19  0318 10/16/19  0930   CSPEC EDTA PLASMA Plasma, EDTA anticoagulant   CMVLOG Not Calculated Not Calculated       CMV viral loads    Log IU/mL of CMVQNT   Date Value Ref Range Status   11/12/2019 Not Calculated <2.1 [Log_IU]/mL Final   10/16/2019 Not Calculated <2.1 [Log_IU]/mL Final   10/08/2019 Not Calculated <2.1 [Log_IU]/mL Final   09/25/2019 Not Calculated <2.1 [Log_IU]/mL Final   08/23/2019 Not Calculated <2.1 [Log_IU]/mL Final   08/06/2019 Not Calculated <2.1 [Log_IU]/mL Final   07/15/2019 Not Calculated <2.1 [Log_IU]/mL Final   07/08/2019 Not Calculated <2.1 [Log_IU]/mL Final   07/01/2019 Not Calculated <2.1 [Log_IU]/mL Final   06/24/2019 Not Calculated <2.1 [Log_IU]/mL Final   06/17/2019 Not Calculated <2.1 [Log_IU]/mL Final   06/07/2019 Not Calculated <2.1 [Log_IU]/mL Final   05/29/2019 Not Calculated <2.1 [Log_IU]/mL Final   05/17/2019 Not Calculated <2.1 [Log_IU]/mL Final       CMV resistance testing  No lab results found.  No results found for: CMVCID, CMVFOS, CMVGAN     EBV viral loads     Recent Labs    Lab Test 11/19/19  1236 11/12/19  0318 10/16/19  0930 10/08/19  1301 09/25/19  0810   EBRES EBV DNA Not Detected <500* 2,725* 1,203* 2,215*     EBV DNA Copies/mL   Date Value Ref Range Status   11/19/2019 EBV DNA Not Detected EBVNEG^EBV DNA Not Detected [Copies]/mL Final   11/12/2019 <500 (A) EBVNEG^EBV DNA Not Detected [Copies]/mL Final     Comment:     EBV DNA Detected below the reportable range of 500 Copies/mL   10/16/2019 2,725 (A) EBVNEG^EBV DNA Not Detected [Copies]/mL Final   10/08/2019 1,203 (A) EBVNEG^EBV DNA Not Detected [Copies]/mL Final   09/25/2019 2,215 (A) EBVNEG^EBV DNA Not Detected [Copies]/mL Final       Human Herpes Virus 6 viral loads    HHV6 DNA Result   Date Value Ref Range Status   11/12/2019 No HHV6 DNA detected NOHHV^No HHV6 DNA detected Copies/mL Final      HHV6 DNA Specimen Type   Date Value Ref Range Status   11/12/2019 Whole blood, EDTA anticoagulant  Final       CMV IgG Antibody   Date Value Ref Range Status   01/05/2005 >160.0 EU/mL Final     Comment:     Positive for anti-CMV IgG     Herpes Simplex IgG Antibody Immune Status Ratio   Date Value Ref Range Status   02/04/2005 1.21  Final     Herpes Simplex IgG Antibody Interpretation   Date Value Ref Range Status   02/04/2005 Positive, suggests immunologic exposure.  Final     No results found for: EBIG2, EBIGM, EBVIGG, EBIGG, EBVAGN, IC2085, TOXG      Pathology:  Mediastinal LN biopsy 12/16/2019  A.  Lymph node, station 7, endobronchial ultrasound guided fine needle   aspiration:   - Polymorphous lymphocytic population present   - Negative for malignancy   Specimen Adequacy: Satisfactory for evaluation.   B.  Lymph node, 4R, endobronchial ultrasound guided fine needle   aspiration:   - Polymorphous lymphocytic population present   - Negative for malignancy   Specimen Adequacy: Satisfactory for evaluation.   C.  Lymph node, 11L, endobronchial ultrasound guided fine needle   aspiration:   Nondiagnostic specimen.   Specimen Adequacy:  "Unsatisfactory for evaluation.  Specimen processed and   evaluated, but unsatisfactory for   evaluation of epithelial cell abnormality due to scant cellularity.     Right axillary LN biopsy 11/15/2019  SPECIMEN(S):   Lymph node, right axillary   FINAL DIAGNOSIS:   Lymph node, right axillary, biopsy:     - Atypical paracortical lymphoid proliferation (see comment)     - Negative for EBV by in situ hybridization     - Negative for fungi and acid fast bacteria per GMS and AFB Angie stained    tissue, respectively     - Focal involvement by plasma cell myeloma   COMMENT:   Summary of ancillary studies:   Flow cytometric immunophenotyping: polytypic plasma cells, insufficient   cellularity for additional studies   ([id number]).   Cytogenetics: normal male karyotype, 46,XY[6] ([id number]).   Molecular: TCR gene rearrangement studies: oligoclonal ([id number]).   This is a challenging and unusual case and was sent for a second opinion   to the [facility] and the interpretation   of \"atypical paracortical lymphoid proliferation\" is reflective of Dr. Munira Dubon's expert opinion. The   consult report will be scanned into EPIC under the Media tab.   There is a diffuse mixed infiltrate that includes small lymphocytes,   histiocytes, eosinophils, plasma cells as   well as scattered immunoblasts. There is an increased density of high   endothelial venules. The lymphoid   component of the infiltrate is predominantly composed of T cells with an   admixture of CD4 and CD8 positive   cells. The possibility of a T-cell lymphoma was considered, specifically   an angioimmunoblastic T-cell   lymphoma, however the presence of many CD8-positive T cells, the largely   negative staining of TFH markers, the   absence of expansion of FDC meshworks and the lack of EBV, all argue   against the interpretation. The   oligoclonal pattern of TCR GNRs by PCR also argues against a clonal   proliferation. In summary with   consideration of the " morphologic/immunophenotypic features, the ancillary   studies, the expert opinion, and the   patient's current status, this case is not felt to be representative of a   T-cell lymphoma, and a reactive   etiology, specifically a drug reaction, should be considered.      Imaging:  CT chest 12/9/2019   IMPRESSION:   1. Since the prior CT on 11/15/2019, the previously seen groundglass  opacities in the upper lobes have resolved. There are persistent areas  of micronodularity, greatest in the right middle lobe and lower lobes,  which may be infectious or inflammatory in etiology.  2. Trace bilateral pleural effusions, decreased in size from the prior  exam.  3. Smooth interlobular septal thickening, suggestive of pulmonary  edema.  4. Mildly enlarged mediastinal and bilateral axillary lymph nodes,  unchanged from the prior exam, which may be reactive. Right axillary  lymphocele has developed.  5. Unchanged multilevel chronic compression deformities of the  thoracic and lumbar spine.  6. Splenomegaly.  CT chest 11/15/2019   IMPRESSION:   1. New groundglass opacities greatest in the upper lobes concerning  for infection.  2. Increased prominence of the interstitium suggesting interstitial  edema and/or component of infection.  3. Small bilateral pleural effusions.  4. Mildly prominent mediastinal lymph nodes are similar to slightly  decreased from 11/9/2019. Post surgical changes of right axillary  jami dissection.    CT c/a/p 11/9/2019  IMPRESSION: In this patient with history of multiple myeloma status  post bone marrow transplant:  1. No specific findings to explain patient's fever of unknown origin.   2. There is a 1.6 cm hypoenhancing ill-defined focus in the superior  pole the right kidney. This is thought to represent sequela of prior  inflammation. Occult malignancy is thought less likely but not  entirely excluded. Recommend follow-up MRI in outpatient setting.  3. The spine is severely demineralized with  multilevel compression  fractures which are stable.  4. Stable bilateral axillary, mediastinal, and superficial inguinal  lymphadenopathy.  5. Stable small peripheral ill-defined hypodensities within the  spleen, nonspecific and may be infarctions.    Again, thank you for allowing me to participate in the care of your patient.      Sincerely,    Collin Albarado MD

## 2019-12-17 NOTE — NURSING NOTE
Chief Complaint   Patient presents with     RECHECK     EBV         BP (!) 167/95 (BP Location: Right arm, Patient Position: Sitting, Cuff Size: Adult Regular)   Pulse 82   Temp 97.7  F (36.5  C) (Oral)   Wt 75.9 kg (167 lb 6.4 oz)   SpO2 99%   BMI 24.02 kg/m         Lizbeth Cavazos CMA     12/17/2019 1:52 PM

## 2019-12-18 LAB
BACTERIA SPEC CULT: NO GROWTH
SPECIMEN SOURCE: NORMAL

## 2019-12-19 LAB
ACID FAST STN SPEC QL: NORMAL
SPECIMEN SOURCE: NORMAL
SPECIMEN SOURCE: NORMAL

## 2019-12-20 ENCOUNTER — APPOINTMENT (OUTPATIENT)
Dept: LAB | Facility: CLINIC | Age: 59
End: 2019-12-20
Attending: INTERNAL MEDICINE
Payer: COMMERCIAL

## 2019-12-20 ENCOUNTER — ONCOLOGY VISIT (OUTPATIENT)
Dept: TRANSPLANT | Facility: CLINIC | Age: 59
End: 2019-12-20
Attending: INTERNAL MEDICINE
Payer: COMMERCIAL

## 2019-12-20 VITALS
RESPIRATION RATE: 16 BRPM | WEIGHT: 161.7 LBS | TEMPERATURE: 97.9 F | OXYGEN SATURATION: 98 % | DIASTOLIC BLOOD PRESSURE: 93 MMHG | BODY MASS INDEX: 23.2 KG/M2 | SYSTOLIC BLOOD PRESSURE: 145 MMHG | HEART RATE: 108 BPM

## 2019-12-20 DIAGNOSIS — I15.8 OTHER SECONDARY HYPERTENSION: ICD-10-CM

## 2019-12-20 DIAGNOSIS — C90.00 MULTIPLE MYELOMA NOT HAVING ACHIEVED REMISSION (H): Primary | ICD-10-CM

## 2019-12-20 PROCEDURE — G0463 HOSPITAL OUTPT CLINIC VISIT: HCPCS | Mod: ZF

## 2019-12-20 PROCEDURE — 36592 COLLECT BLOOD FROM PICC: CPT

## 2019-12-20 PROCEDURE — 36415 COLL VENOUS BLD VENIPUNCTURE: CPT

## 2019-12-20 RX ORDER — LISINOPRIL 5 MG/1
5 TABLET ORAL DAILY
Qty: 30 TABLET | Refills: 1 | Status: SHIPPED | OUTPATIENT
Start: 2019-12-20 | End: 2020-01-14

## 2019-12-20 RX ORDER — AZITHROMYCIN 250 MG/1
250 TABLET, FILM COATED ORAL DAILY
Qty: 30 TABLET | Refills: 0 | Status: SHIPPED | OUTPATIENT
Start: 2019-12-20 | End: 2020-01-14

## 2019-12-20 ASSESSMENT — PAIN SCALES - GENERAL: PAINLEVEL: NO PAIN (0)

## 2019-12-20 NOTE — NURSING NOTE
Chief Complaint   Patient presents with     Blood Draw     labs drawn via PIV placed by RN in lab     BP (!) 145/93 (BP Location: Right arm, Patient Position: Chair, Cuff Size: Adult Regular)   Pulse 108   Temp 97.9  F (36.6  C) (Oral)   Resp 16   Wt 73.3 kg (161 lb 11.2 oz)   SpO2 98%   BMI 23.20 kg/m      PIV placed right upper forearm by RN in lab for infusion and labs. Labs drawn and sent. Pt tolerated well.   Pt checked in for next appointment.    Maria Teresa Bourgeois, RN

## 2019-12-20 NOTE — NURSING NOTE
"Oncology Rooming Note    December 20, 2019 12:00 PM   Angel Yanez is a 59 year old male who presents for:    Chief Complaint   Patient presents with     Blood Draw     labs drawn via PIV placed by RN in lab     RECHECK     Return: Multiple Myeloma      Initial Vitals: BP (!) 145/93 (BP Location: Right arm, Patient Position: Chair, Cuff Size: Adult Regular)   Pulse 108   Temp 97.9  F (36.6  C) (Oral)   Resp 16   Wt 73.3 kg (161 lb 11.2 oz)   SpO2 98%   BMI 23.20 kg/m   Estimated body mass index is 23.2 kg/m  as calculated from the following:    Height as of 12/16/19: 1.778 m (5' 10\").    Weight as of this encounter: 73.3 kg (161 lb 11.2 oz). Body surface area is 1.9 meters squared.  No Pain (0) Comment: Data Unavailable   No LMP for male patient.  Allergies reviewed: Yes  Medications reviewed: Yes    Medications: Medication refills not needed today.  Pharmacy name entered into EPIC:    [facility] - [place], [place] - [address] [facility] [place], [place] [facility] [address] [facility]    Clinical concerns: None        Kathy Perez CMA              "

## 2019-12-20 NOTE — PROGRESS NOTES
BMT Daily Progress Note   12/20/2019     Patient ID:  Angel Yanez is a 58 yo man D+210 s/p 2nd autologous transplant for MM.   S/p 2 readmissions for FUO (10/16-10/23; 9/23-9/29/19). Readmitted on 12/9/2019 with return of fevers despite celebrex, FARZANA, worsening sob and return of rash         Diagnosis MM Multiple myeloma  HCT Type Autologous    Prep Regimen Cytoxan  Melphalan   Donor Source No data was found    GVHD Prophylaxis No  [facility]      Angel was admitted on 12/9/2019 for FARZANA and increased sob with chronic cough.     INTERVAL  HISTORY   Fevers have resolved after starting prednisone.  Guille noteshe doesn't feel as good as he did earlier this week. But he is eating and drinking really well - still loosing weight but really just started eating a lot more in the last week. Still some GRECO but no cough or other issues.      Review of Systems: 10 point ROS negative except as noted above.        PHYSICAL EXAM      Weight In/Out          Wt Readings from Last 3 Encounters:   12/11/19 81.4 kg (179 lb 8 oz)   12/09/19 78.3 kg (172 lb 11.2 oz)   12/06/19 76.8 kg (169 lb 4.8 oz)        I/O last 3 completed shifts:  In: 1477 [P.O.:700; I.V.:200]  Out: 1500 [Urine:1500]      There were no vitals taken for this visit.  Hypertensive last several visits.   General: NAD   Eyes: : FARRAH, sclera anicteric   Nose/Mouth/Throat: OP clear, buccal mucosa moist, no ulcerations   Lungs:  diminished bases  Cardiovascular: RRR, no M/R/G   Abdominal/Rectal: +BS, soft, NT, ND, No HSM   Lymphatics: no edema  Skin: no  Petechaie, Persistent rash on back and chest  Additional Findings: Peripheral IV        LABS AND IMAGING - PAST 24 HOURS     Lab Results   Component Value Date    WBC 3.2 (L) 12/17/2019    ANEU 1.9 12/17/2019    HGB 9.0 (L) 12/17/2019    HCT 26.7 (L) 12/17/2019    PLT 56 (L) 12/17/2019     12/17/2019    POTASSIUM 4.2 12/17/2019    CHLORIDE 108 12/17/2019    CO2 25 12/17/2019     (H)  12/17/2019    BUN 24 12/17/2019    CR 0.68 12/17/2019    MAG 2.1 11/26/2019    INR 1.53 (H) 12/09/2019    BILITOTAL 1.5 (H) 12/13/2019    AST 5 12/13/2019    ALT 21 12/13/2019    ALKPHOS 261 (H) 12/13/2019    PROTTOTAL 5.3 (L) 12/13/2019    ALBUMIN 2.3 (L) 12/13/2019        OVERALL PLAN   Angel Yanez is a 60 yo man D+217 s/p 2nd autologous transplant for MM.   S/p 2 readmissions for FUO (10/16-10/23; 9/23-9/29/19).      1.  BMT/MM:   Slow engraftment; cell dose was only 0.639x10^6. ([facility] [place] - known poor cell dose as failed chemo-mobilization 1/2019.)   - day +180 bone marrow biopsy 11/6/19 which showed no morphologic or immunophenotypic evidence of plasma cell neoplasm. His light chains were not elevated and he had no monoclonal protein in the serum.  He is in clinical remission.  - PET in 8/2019 clear.   - PB FLOW 11/23: No overt aberrant immunophenotype on T cells.  Rare to absent mature B cells and plasma cells.      2.  HEME: Keep Hgb>8g/dL, plt >10 .  -Remains transfusions dependent. Had planned on doing any EBUS of mediastinal LN - plts still down trending 21k - plan for plts 12/24.   - WBC is improving! Last GCSF on 12/13/2019 Give prn.     - Persistent thrombocytopenia: BM 11/6/19 showed adequate granulopoiesis and erythropoiesis but his platelet precursors were 0 to absent which is reflected in peripheral CBC. Started promactic 50mcg daily on 11/10. Has been titrated up to 150mg daily (started 11/23); no change. Promacta stopped 12/6/2019 as not helping.       3.  ID: Afebrile since starting pred  - 12/13 EBUS bx pending (many infectious cultures repeated)- No actual tissue obtained.  -12/9 RVP=neg  -12/9 CT chest is stable with GGO better. No antibiotics given during admission because do not feel like fevers are infection.  Hx FUO  A.) Extensive ID work up unremarkable - see prior notes.     Prophy: Held acyclovir due to FARZANA on admission but restarted and discharged on acyclovir.  Remains  on pentamidine (12/17). See repeat t cell subset, Absolute FZ8=496.  Started fluconazole with prednisone but Dr Mukherjee did not feel like levaquin was warranted.    - influenza vaccination given 11/26/19   - Resume azithro prophy given systemic steroids.          4. Pulm:  Still gets somewhat SOB with activity- but improved with resolution of fevers  - 10/17: Echo with preserved EF, no evidence of right heart strain. Repeat echo 11/14 stable.  - 10/17 VQ scan neg for PE     5. GI:  no Complaints.      6.  FEN/Renal:   Cr stable. 0.7     7.  Mood: Continue Paxil.     8. Derm:   - recent rash on torso that has returned.Guille says this is the same rash. Derm consuled. 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   Reemerged 12/9, resumed triamcinolone TID on torso- persistent rash.    9. CV: Steroid induced hypertension  - start 5mg lisinopril PO daily.       9. Dispo:  After discussion with Dr Mukherjee, looking for common cause.Some kind of auto immune process DDX:  Sarcoidosis? Underwent EBUS FNA which was negative for granulomas however unlikely you could see this on just fluid aspirate so would not consider       RTC 12/24 to ensure no fevers, f/u BP and possible plts.   -Plan to continue pred 30mg daily until seen by Dr Lucio 12/20/19.  12/31 Dr. Naveed Marrufo, PA-C  [phone]

## 2019-12-21 LAB
BACTERIA SPEC CULT: NO GROWTH
SPECIMEN SOURCE: NORMAL

## 2019-12-22 LAB
Lab: NORMAL
MYCOBACTERIUM SPEC CULT: NORMAL
SPECIMEN SOURCE: NORMAL

## 2019-12-24 ENCOUNTER — APPOINTMENT (OUTPATIENT)
Dept: LAB | Facility: CLINIC | Age: 59
End: 2019-12-24
Attending: PHYSICIAN ASSISTANT
Payer: COMMERCIAL

## 2019-12-24 ENCOUNTER — ONCOLOGY VISIT (OUTPATIENT)
Dept: TRANSPLANT | Facility: CLINIC | Age: 59
End: 2019-12-24
Attending: PHYSICIAN ASSISTANT
Payer: COMMERCIAL

## 2019-12-24 VITALS
OXYGEN SATURATION: 100 % | SYSTOLIC BLOOD PRESSURE: 125 MMHG | WEIGHT: 161.8 LBS | BODY MASS INDEX: 23.22 KG/M2 | HEART RATE: 87 BPM | RESPIRATION RATE: 16 BRPM | DIASTOLIC BLOOD PRESSURE: 77 MMHG | TEMPERATURE: 97.8 F

## 2019-12-24 DIAGNOSIS — C90.00 MULTIPLE MYELOMA NOT HAVING ACHIEVED REMISSION (H): Primary | ICD-10-CM

## 2019-12-24 LAB
Lab: NORMAL
SPECIMEN SOURCE: NORMAL
SPECIMEN SOURCE: NORMAL
YEAST SPEC QL CULT: NO GROWTH
YEAST SPEC QL CULT: NO GROWTH

## 2019-12-24 PROCEDURE — 36415 COLL VENOUS BLD VENIPUNCTURE: CPT

## 2019-12-24 PROCEDURE — G0463 HOSPITAL OUTPT CLINIC VISIT: HCPCS | Mod: ZF

## 2019-12-24 ASSESSMENT — PAIN SCALES - GENERAL: PAINLEVEL: NO PAIN (0)

## 2019-12-24 NOTE — NURSING NOTE
Chief Complaint   Patient presents with     RECHECK     Multiple myeloma not having achieved remission      Blood Draw     labs drawn with IV start by rn.  vital signs taken.     Labs drawn with IV start by RN in lab.  Vital signs taken.  Pt checked in to next appointment.    Caitlin Webster RN

## 2019-12-24 NOTE — NURSING NOTE
"Oncology Rooming Note    December 24, 2019 8:20 AM   Angel Yanez is a 59 year old male who presents for:    Chief Complaint   Patient presents with     RECHECK     Multiple myeloma not having achieved remission      Initial Vitals: /77 (BP Location: Right arm, Patient Position: Sitting, Cuff Size: Adult Regular)   Pulse 87   Temp 97.8  F (36.6  C) (Oral)   Resp 16   Wt 73.4 kg (161 lb 12.8 oz)   SpO2 100%   BMI 23.22 kg/m   Estimated body mass index is 23.22 kg/m  as calculated from the following:    Height as of 12/16/19: 1.778 m (5' 10\").    Weight as of this encounter: 73.4 kg (161 lb 12.8 oz). Body surface area is 1.9 meters squared.  No Pain (0) Comment: Data Unavailable   No LMP for male patient.  Allergies reviewed: Yes  Medications reviewed: Yes    Medications: Medication refills not needed today.  Pharmacy name entered into EPIC:    [facility] - [place], [place] - [address] [facility] - [place], [place] - [address]    Clinical concerns: no new concerns or questions.       Nini Mullins CMA              "

## 2019-12-24 NOTE — PROGRESS NOTES
BMT Daily Progress Note        Patient ID:  Angel Yanez is a 58 yo man D+221 s/p 2nd autologous transplant for MM.   S/p 2 readmissions for FUO (10/16-10/23; 9/23-9/29/19). Readmitted on 12/9/2019 with return of fevers despite celebrex, FARZANA, worsening sob and return of rash         Diagnosis MM Multiple myeloma  HCT Type Autologous    Prep Regimen Cytoxan  Melphalan   Donor Source No data was found    GVHD Prophylaxis No  [facility] for FARZANA and increased sob with chronic cough.     INTERVAL  HISTORY   Fevers have resolved after starting prednisone.  Guille is starting to feel better.  Wt is now stabilizing and PO intake is improving.  Still some GRECO but no cough or other issues.   No new medical complaints.     Review of Systems: 10 point ROS negative except as noted above.        PHYSICAL EXAM       Blood pressure 125/77, pulse 87, temperature 97.8  F (36.6  C), temperature source Oral, resp. rate 16, weight 73.4 kg (161 lb 12.8 oz), SpO2 100 %.    Wt Readings from Last 4 Encounters:   12/24/19 73.4 kg (161 lb 12.8 oz)   12/20/19 73.3 kg (161 lb 11.2 oz)   12/17/19 75.9 kg (167 lb 6.4 oz)   12/17/19 75.9 kg (167 lb 6.4 oz)     Hypertensive last several visits.   General: NAD   Eyes: FARRAH, sclera anicteric   Nose/Mouth/Throat: OP clear, buccal mucosa moist, no ulcerations   Lungs:  diminished bases  Cardiovascular: RRR, no M/R/G   Abdominal/Rectal: +BS, soft, NT, ND, No HSM   Lymphatics: no edema  Skin: no  Pet[place]aie, Persistent rash on back and chest  Additional Findings: Peripheral IV        LABS AND IMAGING - PAST 24 HOURS     Lab Results   Component Value Date    WBC 3.8 (L) 12/20/2019    ANEU 2.8 12/20/2019    HGB 9.4 (L) 12/20/2019    HCT 28.3 (L) 12/20/2019    PLT 21 (LL) 12/20/2019     12/20/2019    POTASSIUM 4.3 12/20/2019    CHLORIDE 106 12/20/2019    CO2 27 12/20/2019     (H) 12/20/2019    BUN 23 12/20/2019    CR 0.71 12/20/2019     MAG 2.1 11/26/2019    INR 1.53 (H) 12/09/2019    BILITOTAL 0.8 12/20/2019    AST 8 12/20/2019    ALT 33 12/20/2019    ALKPHOS 144 12/20/2019    PROTTOTAL 6.5 (L) 12/20/2019    ALBUMIN 3.3 (L) 12/20/2019        OVERALL PLAN   Angel Yanez is a 58 yo man D+221 s/p 2nd autologous transplant for MM.   S/p 2 readmissions for FUO (10/16-10/23; 9/23-9/29/19).      1.  BMT/MM:   Slow engraftment; cell dose was only 0.639x10^6. ([facility] [place] - known poor cell dose as failed chemo-mobilization 1/2019.)   - day +180 bone marrow biopsy 11/6/19 which showed no morphologic or immunophenotypic evidence of plasma cell neoplasm. His light chains were not elevated and he had no monoclonal protein in the serum.  He is in clinical remission.  - PET in 8/2019 clear.   - PB FLOW 11/23: No overt aberrant immunophenotype on T cells.  Rare to absent mature B cells and plasma cells.      2.  HEME: Keep Hgb>8g/dL, plt >10 .  -Remains transfusions dependent. Had planned on doing any EBUS of mediastinal LN - plt now stablish - none today but close f/u in order to demonstrate they are stable/up on their own.  - WBC is improving! Last GCSF on 12/13/2019 Give prn.     - Persistent thrombocytopenia: BM 11/6/19 showed adequate granulopoiesis and erythropoiesis but his platelet precursors were 0 to absent which is reflected in peripheral CBC. Started promactic 50mcg daily on 11/10. Has been titrated up to 150mg daily (started 11/23); no change. Promacta stopped 12/6/2019 as not helping.       3.  ID: Afebrile since starting pred  - 12/13 EBUS bx pending (many infectious cultures repeated)- No actual tissue obtained.  -12/9 RVP=neg  -12/9 CT chest is stable with GGO better. No antibiotics given during admission because do not feel like fevers are infection.  Hx FUO  A.) Extensive ID work up unremarkable - see prior notes.     Prophy: Held acyclovir due to FARZANA on admission but restarted and discharged on acyclovir.  Remains on pentamidine (12/17).  See repeat t cell subset, Absolute QT3=123.  Started fluconazole with prednisone  - influenza vaccination given 11/26/19   - Resumed azithro prophy given systemic steroids.          4. Pulm:  Still gets somewhat SOB with activity- but improved with resolution of fevers  - 10/17: Echo with preserved EF, no evidence of right heart strain. Repeat echo 11/14 stable.  - 10/17 VQ scan neg for PE     5. GI:  no Complaints.      6.  FEN/Renal:   Cr stable. 0.7     7.  Mood: Continue Paxil.     8. Derm:   - recent rash on torso that has returned.Guille says this is the same rash. Derm consuled. 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   Reemerged 12/9, resumed triamcinolone TID on torso- persistent rash.    9. CV: Steroid induced hypertension  - start 5mg lisinopril PO daily.       9. Dispo:  After discussion with Dr Mukherjee, looking for common cause.Some kind of auto immune process DDX:  Sarcoidosis? Underwent EBUS FNA which was negative for granulomas however unlikely you could see this on just fluid aspirate so would not consider   - now on prednisone 30mg/day      RTC   - 12/27 for CBC and possible plt transfusion  - Plan to continue pred 30mg daily until seen by Dr Lucio (12/31)       Darlin Coulter pa-c  [phone]

## 2019-12-25 LAB
SPECIMEN SOURCE: NORMAL
YEAST SPEC QL CULT: NO GROWTH

## 2019-12-26 LAB
BLD PROD TYP BPU: NORMAL
NUM BPU REQUESTED: 1

## 2019-12-27 ENCOUNTER — INFUSION THERAPY VISIT (OUTPATIENT)
Dept: TRANSPLANT | Facility: CLINIC | Age: 59
End: 2019-12-27
Attending: INTERNAL MEDICINE
Payer: COMMERCIAL

## 2019-12-27 ENCOUNTER — APPOINTMENT (OUTPATIENT)
Dept: LAB | Facility: CLINIC | Age: 59
End: 2019-12-27
Attending: INTERNAL MEDICINE
Payer: COMMERCIAL

## 2019-12-27 VITALS
HEART RATE: 77 BPM | RESPIRATION RATE: 16 BRPM | SYSTOLIC BLOOD PRESSURE: 123 MMHG | OXYGEN SATURATION: 100 % | TEMPERATURE: 98.4 F | WEIGHT: 161.7 LBS | DIASTOLIC BLOOD PRESSURE: 82 MMHG | BODY MASS INDEX: 23.2 KG/M2

## 2019-12-27 DIAGNOSIS — L40.50 PSORIASIS WITH ARTHROPATHY (H): ICD-10-CM

## 2019-12-27 DIAGNOSIS — C90.00 MULTIPLE MYELOMA NOT HAVING ACHIEVED REMISSION (H): Primary | ICD-10-CM

## 2019-12-27 LAB
ABO + RH BLD: NORMAL
ABO + RH BLD: NORMAL
BASOPHILS # BLD AUTO: 0 10E9/L (ref 0–0.2)
BASOPHILS NFR BLD AUTO: 0.4 %
BLD GP AB SCN SERPL QL: NORMAL
BLD PROD TYP BPU: NORMAL
BLD UNIT ID BPU: 0
BLOOD BANK CMNT PATIENT-IMP: NORMAL
BLOOD PRODUCT CODE: NORMAL
BPU ID: NORMAL
DIFFERENTIAL METHOD BLD: ABNORMAL
EOSINOPHIL # BLD AUTO: 0 10E9/L (ref 0–0.7)
EOSINOPHIL NFR BLD AUTO: 0.4 %
ERYTHROCYTE [DISTWIDTH] IN BLOOD BY AUTOMATED COUNT: 21.5 % (ref 10–15)
HCT VFR BLD AUTO: 26.5 % (ref 40–53)
HGB BLD-MCNC: 8.5 G/DL (ref 13.3–17.7)
IMM GRANULOCYTES # BLD: 0 10E9/L (ref 0–0.4)
IMM GRANULOCYTES NFR BLD: 0.9 %
LYMPHOCYTES # BLD AUTO: 0.8 10E9/L (ref 0.8–5.3)
LYMPHOCYTES NFR BLD AUTO: 36.5 %
MCH RBC QN AUTO: 32.9 PG (ref 26.5–33)
MCHC RBC AUTO-ENTMCNC: 32.1 G/DL (ref 31.5–36.5)
MCV RBC AUTO: 103 FL (ref 78–100)
MONOCYTES # BLD AUTO: 0.3 10E9/L (ref 0–1.3)
MONOCYTES NFR BLD AUTO: 10.9 %
NEUTROPHILS # BLD AUTO: 1.2 10E9/L (ref 1.6–8.3)
NEUTROPHILS NFR BLD AUTO: 50.9 %
NRBC # BLD AUTO: 0 10*3/UL
NRBC BLD AUTO-RTO: 0 /100
PLATELET # BLD AUTO: 11 10E9/L (ref 150–450)
RBC # BLD AUTO: 2.58 10E12/L (ref 4.4–5.9)
SPECIMEN EXP DATE BLD: NORMAL
TRANSFUSION STATUS PATIENT QL: NORMAL
TRANSFUSION STATUS PATIENT QL: NORMAL
WBC # BLD AUTO: 2.3 10E9/L (ref 4–11)

## 2019-12-27 PROCEDURE — 85025 COMPLETE CBC W/AUTO DIFF WBC: CPT | Performed by: PHYSICIAN ASSISTANT

## 2019-12-27 PROCEDURE — 86850 RBC ANTIBODY SCREEN: CPT | Performed by: INTERNAL MEDICINE

## 2019-12-27 PROCEDURE — 86901 BLOOD TYPING SEROLOGIC RH(D): CPT | Performed by: INTERNAL MEDICINE

## 2019-12-27 PROCEDURE — P9037 PLATE PHERES LEUKOREDU IRRAD: HCPCS | Performed by: PHYSICIAN ASSISTANT

## 2019-12-27 PROCEDURE — 36430 TRANSFUSION BLD/BLD COMPNT: CPT

## 2019-12-27 PROCEDURE — 86900 BLOOD TYPING SEROLOGIC ABO: CPT | Performed by: INTERNAL MEDICINE

## 2019-12-27 ASSESSMENT — PAIN SCALES - GENERAL: PAINLEVEL: NO PAIN (0)

## 2019-12-27 NOTE — PROGRESS NOTES
Infusion Nursing Note:  Angel Yanez presents today for platelets.    Patient seen by provider today: No   present during visit today: Not Applicable.    Note: Patient arrives for platelet transfusion, count 11 today but he is not back until 12/31.  Transfused 1u platelets and patient tolerated without incident.  No further replacement needs today, ANC > 1000 so no G indicated.    Intravenous Access:  Peripheral IV placed.    Treatment Conditions:  Lab Results   Component Value Date    HGB 8.5 12/27/2019     Lab Results   Component Value Date    WBC 2.3 12/27/2019      Lab Results   Component Value Date    ANEU 1.2 12/27/2019     Lab Results   Component Value Date    PLT 11 12/27/2019          Post Infusion Assessment:  Patient tolerated infusion without incident.  No evidence of extravasations.  Access discontinued per protocol.       Discharge Plan:   Patient discharged in stable condition accompanied by: son.  Departure Mode: Ambulatory.  Patient aware of follow up appointments.    Isamar Tam RN, BMTCN

## 2019-12-27 NOTE — NURSING NOTE
Chief Complaint   Patient presents with     Blood Draw     Labs drawn via  by RN in lab. VS taken. Patient checked in for next appt.     Labs collected from venipuncture by RN. Vitals taken. Checked in for appointment.    Ling Rios RN

## 2019-12-30 LAB
BACTERIA SPEC CULT: NORMAL
Lab: NORMAL
SPECIMEN SOURCE: NORMAL

## 2019-12-31 ENCOUNTER — APPOINTMENT (OUTPATIENT)
Dept: LAB | Facility: CLINIC | Age: 59
End: 2019-12-31
Attending: INTERNAL MEDICINE
Payer: COMMERCIAL

## 2019-12-31 ENCOUNTER — ONCOLOGY VISIT (OUTPATIENT)
Dept: TRANSPLANT | Facility: CLINIC | Age: 59
End: 2019-12-31
Attending: INTERNAL MEDICINE
Payer: COMMERCIAL

## 2019-12-31 DIAGNOSIS — C90.00 MULTIPLE MYELOMA NOT HAVING ACHIEVED REMISSION (H): ICD-10-CM

## 2019-12-31 DIAGNOSIS — Z94.81 STATUS POST BONE MARROW TRANSPLANT (H): Primary | ICD-10-CM

## 2019-12-31 LAB
ANION GAP SERPL CALCULATED.3IONS-SCNC: 11 MMOL/L (ref 3–14)
ANISOCYTOSIS BLD QL SMEAR: ABNORMAL
BASOPHILS # BLD AUTO: 0 10E9/L (ref 0–0.2)
BASOPHILS NFR BLD AUTO: 0.5 %
BUN SERPL-MCNC: 25 MG/DL (ref 7–30)
CALCIUM SERPL-MCNC: 8.8 MG/DL (ref 8.5–10.1)
CHLORIDE SERPL-SCNC: 107 MMOL/L (ref 94–109)
CO2 SERPL-SCNC: 25 MMOL/L (ref 20–32)
CREAT SERPL-MCNC: 0.92 MG/DL (ref 0.66–1.25)
DACRYOCYTES BLD QL SMEAR: SLIGHT
DIFFERENTIAL METHOD BLD: ABNORMAL
EOSINOPHIL # BLD AUTO: 0 10E9/L (ref 0–0.7)
EOSINOPHIL NFR BLD AUTO: 0 %
ERYTHROCYTE [DISTWIDTH] IN BLOOD BY AUTOMATED COUNT: 22.9 % (ref 10–15)
GFR SERPL CREATININE-BSD FRML MDRD: 89 ML/MIN/{1.73_M2}
GLUCOSE SERPL-MCNC: 167 MG/DL (ref 70–99)
HCT VFR BLD AUTO: 25.1 % (ref 40–53)
HGB BLD-MCNC: 8.4 G/DL (ref 13.3–17.7)
IMM GRANULOCYTES # BLD: 0 10E9/L (ref 0–0.4)
IMM GRANULOCYTES NFR BLD: 0 %
LYMPHOCYTES # BLD AUTO: 0.6 10E9/L (ref 0.8–5.3)
LYMPHOCYTES NFR BLD AUTO: 29.1 %
MACROCYTES BLD QL SMEAR: PRESENT
MCH RBC QN AUTO: 33.5 PG (ref 26.5–33)
MCHC RBC AUTO-ENTMCNC: 33.5 G/DL (ref 31.5–36.5)
MCV RBC AUTO: 100 FL (ref 78–100)
MICROCYTES BLD QL SMEAR: PRESENT
MONOCYTES # BLD AUTO: 0.1 10E9/L (ref 0–1.3)
MONOCYTES NFR BLD AUTO: 5.9 %
NEUTROPHILS # BLD AUTO: 1.3 10E9/L (ref 1.6–8.3)
NEUTROPHILS NFR BLD AUTO: 64.5 %
NRBC # BLD AUTO: 0 10*3/UL
NRBC BLD AUTO-RTO: 1 /100
OVALOCYTES BLD QL SMEAR: SLIGHT
PLATELET # BLD AUTO: 17 10E9/L (ref 150–450)
PLATELET # BLD EST: ABNORMAL 10*3/UL
POIKILOCYTOSIS BLD QL SMEAR: SLIGHT
POTASSIUM SERPL-SCNC: 4.7 MMOL/L (ref 3.4–5.3)
RBC # BLD AUTO: 2.51 10E12/L (ref 4.4–5.9)
SODIUM SERPL-SCNC: 143 MMOL/L (ref 133–144)
WBC # BLD AUTO: 2 10E9/L (ref 4–11)

## 2019-12-31 PROCEDURE — 85025 COMPLETE CBC W/AUTO DIFF WBC: CPT | Performed by: PHYSICIAN ASSISTANT

## 2019-12-31 PROCEDURE — 86900 BLOOD TYPING SEROLOGIC ABO: CPT | Performed by: INTERNAL MEDICINE

## 2019-12-31 PROCEDURE — 80048 BASIC METABOLIC PNL TOTAL CA: CPT | Performed by: PHYSICIAN ASSISTANT

## 2019-12-31 PROCEDURE — 86923 COMPATIBILITY TEST ELECTRIC: CPT | Performed by: INTERNAL MEDICINE

## 2019-12-31 PROCEDURE — G0463 HOSPITAL OUTPT CLINIC VISIT: HCPCS

## 2019-12-31 PROCEDURE — 86850 RBC ANTIBODY SCREEN: CPT | Performed by: INTERNAL MEDICINE

## 2019-12-31 PROCEDURE — 86901 BLOOD TYPING SEROLOGIC RH(D): CPT | Performed by: INTERNAL MEDICINE

## 2019-12-31 PROCEDURE — 36415 COLL VENOUS BLD VENIPUNCTURE: CPT

## 2019-12-31 NOTE — PROGRESS NOTES
T 98.3  RR 16,    132/76  98% O2 sat.  Wt Readings from Last 4 Encounters:   12/31/19 73.9 kg (162 lb 14.4 oz)   12/27/19 73.3 kg (161 lb 11.2 oz)   12/24/19 73.4 kg (161 lb 12.8 oz)   12/20/19 73.3 kg (161 lb 11.2 oz)       Patient ID:  Angel Yanez is a 58 yo man D+228 s/p 2nd autologous transplant for MM.   S/p 2 readmissions for FUO (10/16-10/23; 9/23-9/29/19). Readmitted on 12/9/2019 with return of fevers despite celebrex, FARZANA, worsening sob and return of rash         Diagnosis MM Multiple myeloma  HCT Type Autologous    Prep Regimen Cytoxan  Melphalan   Donor Source No data was found    GVHD Prophylaxis No  Primary BMT Provider [facility] ([place], [place])      Angel was admitted on 12/9/2019 for FARZANA and increased sob with chronic cough.  He received prednisone and over time his fevers resolved. His renal function recovered.  An extensive ID workup has identified no pathogens.  The EBUS of the nodes was negative. EBV low level reactivation has cleared but not rechecked in some time.    Guille returns for follow-up. In the last few days he has had no fevers chills sweats.  He feels like he has to have a bowel movement after he eats and sometimes several times daily but it seems always postprandial.  It's not associated with nausea heartburn or loose stools.  He is trying hard to eat more.  He has had no bleeding or bruising and no particular respiratory symptoms but he still feels somewhat short of breath with exertion though his wife says that sometimes represents several flights of stairs.  There are no associated palpitations chest pains or orthopnea.  The rest of his review of systems is unrevealing.    His exam shows his weight stable in the last 10 days but it is down 5 to 7 kg from the time of his acute kidney injury.  His vital signs are acceptable. His oropharynx is clear; he has no conjunctival irritation.  His lungs are clear without rales or wheezing and his heart tones are somewhat rapid but without a gallop.   His abdomen is soft without hepatosplenomegaly or tenderness.  He has no bone tenderness or lower extremity edema.  There is no overt rash, at least in the areas examined.    His blood counts are low but stable and his renal function is returned to his baseline.  His albumin is also returned to normal.    He has no known infection despite the extensive work-up that has been done and worried and will leave him on prednisone 30 mg/day for now.    If after  2 more weeks (by January 15) we can then begin a slow taper to get him to every other day 30 mg prednisone  over perhaps 5 to 6 weeks time, reducing the alternate day dose by 5 mg/week.    It is uncertain that he needs either fluconazole or azithromycin at this point.  It is reasonable to leave him on low-dose acyclovir.    He still needs blood count checks for possible G-CSF and or platelets perhaps twice weekly at a minimum but I made no other changes in his treatment regimen.    We have no specific diagnosis for his febrile episodes but will attempt to observe on the prednisone and then slowly taper it and adapt the therapeutic support as needed.    He will return in 3 days for possible G-CSF and possible platelet transfusion.    He knows to call if he has fever bleeding or other new problems    Adebayo Lucio MD    Professor of medicine    Results for SAMEER KNIGHT SUMMER (MRN [id number]) as of 12/31/2019 16:10   Ref. Range 12/27/2019 02:00 12/27/2019 02:00 12/27/2019 07:59 12/31/2019 07:00 12/31/2019 14:50   Sodium Latest Ref Range: 133 - 144 mmol/L     143   Potassium Latest Ref Range: 3.4 - 5.3 mmol/L     4.7   Chloride Latest Ref Range: 94 - 109 mmol/L     107   Carbon Dioxide Latest Ref Range: 20 - 32 mmol/L     25   Urea Nitrogen Latest Ref Range: 7 - 30 mg/dL     25   Creatinine Latest Ref Range: 0.66 - 1.25 mg/dL     0.92   GFR Estimate Latest Ref Range: >60 mL/min/1.73_m2     89   GFR Estimate If Black Latest Ref Range: >60 mL/min/1.73_m2     >90   Calcium  Latest Ref Range: 8.5 - 10.1 mg/dL     8.8   Anion Gap Latest Ref Range: 3 - 14 mmol/L     11   Glucose Latest Ref Range: 70 - 99 mg/dL     167 (H)   WBC Latest Ref Range: 4.0 - 11.0 10e9/L   2.3 (L)  2.0 (L)   Hemoglobin Latest Ref Range: 13.3 - 17.7 g/dL   8.5 (L)  8.4 (L)   Hematocrit Latest Ref Range: 40.0 - 53.0 %   26.5 (L)  25.1 (L)   Platelet Count Latest Ref Range: 150 - 450 10e9/L   11 (LL)  17 (LL)   RBC Count Latest Ref Range: 4.4 - 5.9 10e12/L   2.58 (L)  2.51 (L)   MCV Latest Ref Range: 78 - 100 fl   103 (H)  100   MCH Latest Ref Range: 26.5 - 33.0 pg   32.9  33.5 (H)   MCHC Latest Ref Range: 31.5 - 36.5 g/dL   32.1  33.5   RDW Latest Ref Range: 10.0 - 15.0 %   21.5 (H)  22.9 (H)   Diff Method Unknown   Automated Method  Automated Method   % Neutrophils Latest Units: %   50.9  64.5   % Lymphocytes Latest Units: %   36.5  29.1   % Monocytes Latest Units: %   10.9  5.9   % Eosinophils Latest Units: %   0.4  0.0   % Basophils Latest Units: %   0.4  0.5   % Immature Granulocytes Latest Units: %   0.9  0.0   Nucleated RBCs Latest Ref Range: 0 /100   0  1 (H)   Absolute Neutrophil Latest Ref Range: 1.6 - 8.3 10e9/L   1.2 (L)  1.3 (L)   Absolute Lymphocytes Latest Ref Range: 0.8 - 5.3 10e9/L   0.8  0.6 (L)   Absolute Monocytes Latest Ref Range: 0.0 - 1.3 10e9/L   0.3  0.1   Absolute Eosinophils Latest Ref Range: 0.0 - 0.7 10e9/L   0.0  0.0   Absolute Basophils Latest Ref Range: 0.0 - 0.2 10e9/L   0.0  0.0   Abs Immature Granulocytes Latest Ref Range: 0 - 0.4 10e9/L   0.0  0.0   Absolute Nucleated RBC Unknown   0.0  0.0   Anisocytosis Unknown     Moderate   Poikilocytosis Unknown     Slight   Teardrop Cells Unknown     Slight   Ovalocytes Unknown     Slight   Microcytes Unknown     Present   Macrocytes Unknown     Present   Platelet Estimate Unknown     Confirming automated cell count   ABO Unknown   O     RH(D) Unknown   Pos     Antibody Screen Unknown   Neg     Test Valid Only At Latest Units:        [facility]     Specimen Expires Unknown   12/30/2019     Blood Component Type Unknown PLT Pheresis PlateletPheresis,LeukoRed Irrad (Part 2)  PlateletPheresis LeukoReduced Irradiated    Unit Number Unknown [id number]   [id number]    Division Number Unknown 00   00    Status of Unit Unknown Released to care unit   Ready for patient 12/31/2019 1505    Unit Status Unknown ISS   MAE

## 2019-12-31 NOTE — LETTER
12/31/2019      RE: Angel Yanez  [id number]  [facility] [address]       T 98.3  RR 16,    132/76  98% O2 sat.  Wt Readings from Last 4 Encounters:   12/31/19 73.9 kg (162 lb 14.4 oz)   12/27/19 73.3 kg (161 lb 11.2 oz)   12/24/19 73.4 kg (161 lb 12.8 oz)   12/20/19 73.3 kg (161 lb 11.2 oz)       Patient ID:  Angel Yanez is a 60 yo man D+228 s/p 2nd autologous transplant for MM.   S/p 2 readmissions for FUO (10/16-10/23; 9/23-9/29/19). Readmitted on 12/9/2019 with return of fevers despite celebrex, FARZANA, worsening sob and return of rash         Diagnosis MM Multiple myeloma  HCT Type Autologous    Prep Regimen Cytoxan  Melphalan   Donor Source No data was found    GVHD Prophylaxis No  Primary BMT Provider [facility]      Angel was admitted on 12/9/2019 for FARZANA and increased sob with chronic cough.  He received prednisone and over time his fevers resolved. His renal function recovered.  An extensive ID workup has identified no pathogens.  The EBUS of the nodes was negative. EBV low level reactivation has cleared but not rechecked in some time.    Guille returns for follow-up. In the last few days he has had no fevers chills sweats.  He feels like he has to have a bowel movement after he eats and sometimes several times daily but it seems always postprandial.  It's not associated with nausea heartburn or loose stools.  He is trying hard to eat more.  He has had no bleeding or bruising and no particular respiratory symptoms but he still feels somewhat short of breath with exertion though his wife says that sometimes represents several flights of stairs.  There are no associated palpitations chest pains or orthopnea.  The rest of his review of systems is unrevealing.    His exam shows his weight stable in the last 10 days but it is down 5 to 7 kg from the time of his acute kidney injury.  His vital signs are acceptable. His oropharynx is clear; he has no conjunctival irritation.  His lungs are clear  without rales or wheezing and his heart tones are somewhat rapid but without a gallop.  His abdomen is soft without hepatosplenomegaly or tenderness.  He has no bone tenderness or lower extremity edema.  There is no overt rash, at least in the areas examined.    His blood counts are low but stable and his renal function is returned to his baseline.  His albumin is also returned to normal.    He has no known infection despite the extensive work-up that has been done and worried and will leave him on prednisone 30 mg/day for now.    If after  2 more weeks (by January 15) we can then begin a slow taper to get him to every other day 30 mg prednisone  over perhaps 5 to 6 weeks time, reducing the alternate day dose by 5 mg/week.    It is uncertain that he needs either fluconazole or azithromycin at this point.  It is reasonable to leave him on low-dose acyclovir.    He still needs blood count checks for possible G-CSF and or platelets perhaps twice weekly at a minimum but I made no other changes in his treatment regimen.    We have no specific diagnosis for his febrile episodes but will attempt to observe on the prednisone and then slowly taper it and adapt the therapeutic support as needed.    He will return in 3 days for possible G-CSF and possible platelet transfusion.    He knows to call if he has fever bleeding or other new problems    Adebayo Lucio MD    Professor of medicine    Results for SAMEER KNIGHT (MRN [id number]) as of 12/31/2019 16:10   Ref. Range 12/27/2019 02:00 12/27/2019 02:00 12/27/2019 07:59 12/31/2019 07:00 12/31/2019 14:50   Sodium Latest Ref Range: 133 - 144 mmol/L     143   Potassium Latest Ref Range: 3.4 - 5.3 mmol/L     4.7   Chloride Latest Ref Range: 94 - 109 mmol/L     107   Carbon Dioxide Latest Ref Range: 20 - 32 mmol/L     25   Urea Nitrogen Latest Ref Range: 7 - 30 mg/dL     25   Creatinine Latest Ref Range: 0.66 - 1.25 mg/dL     0.92   GFR Estimate Latest Ref Range: >60 mL/min/1.73_m2      89   GFR Estimate If Black Latest Ref Range: >60 mL/min/1.73_m2     >90   Calcium Latest Ref Range: 8.5 - 10.1 mg/dL     8.8   Anion Gap Latest Ref Range: 3 - 14 mmol/L     11   Glucose Latest Ref Range: 70 - 99 mg/dL     167 (H)   WBC Latest Ref Range: 4.0 - 11.0 10e9/L   2.3 (L)  2.0 (L)   Hemoglobin Latest Ref Range: 13.3 - 17.7 g/dL   8.5 (L)  8.4 (L)   Hematocrit Latest Ref Range: 40.0 - 53.0 %   26.5 (L)  25.1 (L)   Platelet Count Latest Ref Range: 150 - 450 10e9/L   11 (LL)  17 (LL)   RBC Count Latest Ref Range: 4.4 - 5.9 10e12/L   2.58 (L)  2.51 (L)   MCV Latest Ref Range: 78 - 100 fl   103 (H)  100   MCH Latest Ref Range: 26.5 - 33.0 pg   32.9  33.5 (H)   MCHC Latest Ref Range: 31.5 - 36.5 g/dL   32.1  33.5   RDW Latest Ref Range: 10.0 - 15.0 %   21.5 (H)  22.9 (H)   Diff Method Unknown   Automated Method  Automated Method   % Neutrophils Latest Units: %   50.9  64.5   % Lymphocytes Latest Units: %   36.5  29.1   % Monocytes Latest Units: %   10.9  5.9   % Eosinophils Latest Units: %   0.4  0.0   % Basophils Latest Units: %   0.4  0.5   % Immature Granulocytes Latest Units: %   0.9  0.0   Nucleated RBCs Latest Ref Range: 0 /100   0  1 (H)   Absolute Neutrophil Latest Ref Range: 1.6 - 8.3 10e9/L   1.2 (L)  1.3 (L)   Absolute Lymphocytes Latest Ref Range: 0.8 - 5.3 10e9/L   0.8  0.6 (L)   Absolute Monocytes Latest Ref Range: 0.0 - 1.3 10e9/L   0.3  0.1   Absolute Eosinophils Latest Ref Range: 0.0 - 0.7 10e9/L   0.0  0.0   Absolute Basophils Latest Ref Range: 0.0 - 0.2 10e9/L   0.0  0.0   Abs Immature Granulocytes Latest Ref Range: 0 - 0.4 10e9/L   0.0  0.0   Absolute Nucleated RBC Unknown   0.0  0.0   Anisocytosis Unknown     Moderate   Poikilocytosis Unknown     Slight   Teardrop Cells Unknown     Slight   Ovalocytes Unknown     Slight   Microcytes Unknown     Present   Macrocytes Unknown     Present   Platelet Estimate Unknown     Confirming automated cell count   ABO Unknown   O     RH(D) Unknown   Pos      Antibody Screen Unknown   Neg     Test Valid Only At Latest Units:       [facility]     Specimen Expires Unknown   12/30/2019     Blood Component Type Unknown PLT Pheresis PlateletPheresis,LeukoRed Irrad (Part 2)  PlateletPheresis LeukoReduced Irradiated    Unit Number Unknown [id number]   [id number]    Division Number Unknown 00   00    Status of Unit Unknown Released to care unit   Ready for patient 12/31/2019 1505    Unit Status Unknown ISS   MAE        Adebayo Lucio MD

## 2020-01-02 LAB
ABO + RH BLD: NORMAL
ABO + RH BLD: NORMAL
BLD GP AB SCN SERPL QL: NORMAL
BLD PROD TYP BPU: NORMAL
BLOOD BANK CMNT PATIENT-IMP: NORMAL
NUM BPU REQUESTED: 2
SPECIMEN EXP DATE BLD: NORMAL

## 2020-01-03 ENCOUNTER — APPOINTMENT (OUTPATIENT)
Dept: LAB | Facility: CLINIC | Age: 60
End: 2020-01-03
Attending: INTERNAL MEDICINE
Payer: COMMERCIAL

## 2020-01-03 ENCOUNTER — INFUSION THERAPY VISIT (OUTPATIENT)
Dept: TRANSPLANT | Facility: CLINIC | Age: 60
End: 2020-01-03
Attending: INTERNAL MEDICINE
Payer: COMMERCIAL

## 2020-01-03 VITALS
BODY MASS INDEX: 23.19 KG/M2 | DIASTOLIC BLOOD PRESSURE: 78 MMHG | HEART RATE: 81 BPM | RESPIRATION RATE: 16 BRPM | WEIGHT: 161.6 LBS | SYSTOLIC BLOOD PRESSURE: 128 MMHG | OXYGEN SATURATION: 98 % | TEMPERATURE: 98.9 F

## 2020-01-03 DIAGNOSIS — C90.00 MULTIPLE MYELOMA NOT HAVING ACHIEVED REMISSION (H): ICD-10-CM

## 2020-01-03 DIAGNOSIS — Z94.81 STATUS POST BONE MARROW TRANSPLANT (H): ICD-10-CM

## 2020-01-03 DIAGNOSIS — L40.50 PSORIASIS WITH ARTHROPATHY (H): Primary | ICD-10-CM

## 2020-01-03 LAB
ALBUMIN SERPL-MCNC: 4.3 G/DL (ref 3.4–5)
ALP SERPL-CCNC: 105 U/L (ref 40–150)
ALT SERPL W P-5'-P-CCNC: 37 U/L (ref 0–70)
ANION GAP SERPL CALCULATED.3IONS-SCNC: 12 MMOL/L (ref 3–14)
AST SERPL W P-5'-P-CCNC: 19 U/L (ref 0–45)
BASOPHILS # BLD AUTO: 0 10E9/L (ref 0–0.2)
BASOPHILS NFR BLD AUTO: 0.4 %
BILIRUB SERPL-MCNC: 0.7 MG/DL (ref 0.2–1.3)
BLD PROD TYP BPU: NORMAL
BLD UNIT ID BPU: 0
BLOOD PRODUCT CODE: NORMAL
BPU ID: NORMAL
BUN SERPL-MCNC: 29 MG/DL (ref 7–30)
CALCIUM SERPL-MCNC: 9.7 MG/DL (ref 8.5–10.1)
CHLORIDE SERPL-SCNC: 106 MMOL/L (ref 94–109)
CO2 SERPL-SCNC: 27 MMOL/L (ref 20–32)
CREAT SERPL-MCNC: 0.99 MG/DL (ref 0.66–1.25)
DIFFERENTIAL METHOD BLD: ABNORMAL
EOSINOPHIL # BLD AUTO: 0 10E9/L (ref 0–0.7)
EOSINOPHIL NFR BLD AUTO: 1.6 %
ERYTHROCYTE [DISTWIDTH] IN BLOOD BY AUTOMATED COUNT: 24.2 % (ref 10–15)
GFR SERPL CREATININE-BSD FRML MDRD: 82 ML/MIN/{1.73_M2}
GLUCOSE SERPL-MCNC: 95 MG/DL (ref 70–99)
HCT VFR BLD AUTO: 26.1 % (ref 40–53)
HGB BLD-MCNC: 8.6 G/DL (ref 13.3–17.7)
IMM GRANULOCYTES # BLD: 0 10E9/L (ref 0–0.4)
IMM GRANULOCYTES NFR BLD: 0.8 %
LYMPHOCYTES # BLD AUTO: 0.7 10E9/L (ref 0.8–5.3)
LYMPHOCYTES NFR BLD AUTO: 28.9 %
MAGNESIUM SERPL-MCNC: 2.2 MG/DL (ref 1.6–2.3)
MCH RBC QN AUTO: 33.7 PG (ref 26.5–33)
MCHC RBC AUTO-ENTMCNC: 33 G/DL (ref 31.5–36.5)
MCV RBC AUTO: 102 FL (ref 78–100)
MONOCYTES # BLD AUTO: 0.3 10E9/L (ref 0–1.3)
MONOCYTES NFR BLD AUTO: 11.9 %
NEUTROPHILS # BLD AUTO: 1.4 10E9/L (ref 1.6–8.3)
NEUTROPHILS NFR BLD AUTO: 56.4 %
NRBC # BLD AUTO: 0 10*3/UL
NRBC BLD AUTO-RTO: 1 /100
PLATELET # BLD AUTO: 11 10E9/L (ref 150–450)
POTASSIUM SERPL-SCNC: 4 MMOL/L (ref 3.4–5.3)
PROT SERPL-MCNC: 7.9 G/DL (ref 6.8–8.8)
RBC # BLD AUTO: 2.55 10E12/L (ref 4.4–5.9)
SODIUM SERPL-SCNC: 145 MMOL/L (ref 133–144)
TRANSFUSION STATUS PATIENT QL: NORMAL
WBC # BLD AUTO: 2.5 10E9/L (ref 4–11)

## 2020-01-03 PROCEDURE — P9037 PLATE PHERES LEUKOREDU IRRAD: HCPCS | Performed by: PHYSICIAN ASSISTANT

## 2020-01-03 PROCEDURE — 83735 ASSAY OF MAGNESIUM: CPT | Performed by: INTERNAL MEDICINE

## 2020-01-03 PROCEDURE — 36430 TRANSFUSION BLD/BLD COMPNT: CPT

## 2020-01-03 PROCEDURE — 85025 COMPLETE CBC W/AUTO DIFF WBC: CPT | Performed by: INTERNAL MEDICINE

## 2020-01-03 PROCEDURE — 80053 COMPREHEN METABOLIC PANEL: CPT | Performed by: INTERNAL MEDICINE

## 2020-01-03 ASSESSMENT — PAIN SCALES - GENERAL: PAINLEVEL: NO PAIN (0)

## 2020-01-03 NOTE — PROGRESS NOTES
Infusion Nursing Note:  Angel Yanez presents today for transfusion of platelets HX: MM   Patient seen by provider today: No   present during visit today: Not Applicable.    Note: Pt arrived, ambulatory, by self to BMT infusion.  Pt denies current complaints.  PT denies bleeding as outpatient.  PT to receive a transfusion of platelets, no premeds per blood plan.  Will monitor tolerance of transfusion.    Intravenous Access:  Peripheral IV placed.  Removed upon completion of use.  Pressure wrap applied to right AC.    Treatment Conditions:  Results reviewed, labs MET treatment parameters, ok to proceed with treatment.  plts 11K today.      Post Infusion Assessment:  Patient tolerated transfusion of platelets without evidence of reaction. Pt denies complaints at this time.  VSS.      Discharge Plan:   Patient discharged in stable condition accompanied by: self.  Pt to schedule return appointment for labs/possible infusion for next Tuesday.    Emelina Bose RN

## 2020-01-07 ENCOUNTER — ONCOLOGY VISIT (OUTPATIENT)
Dept: TRANSPLANT | Facility: CLINIC | Age: 60
End: 2020-01-07
Attending: INTERNAL MEDICINE
Payer: COMMERCIAL

## 2020-01-07 ENCOUNTER — APPOINTMENT (OUTPATIENT)
Dept: LAB | Facility: CLINIC | Age: 60
End: 2020-01-07
Attending: INTERNAL MEDICINE
Payer: COMMERCIAL

## 2020-01-07 VITALS
TEMPERATURE: 98.1 F | HEART RATE: 85 BPM | DIASTOLIC BLOOD PRESSURE: 81 MMHG | WEIGHT: 163.5 LBS | BODY MASS INDEX: 23.46 KG/M2 | OXYGEN SATURATION: 100 % | RESPIRATION RATE: 16 BRPM | SYSTOLIC BLOOD PRESSURE: 138 MMHG

## 2020-01-07 DIAGNOSIS — C90.01 MULTIPLE MYELOMA IN REMISSION (H): Primary | ICD-10-CM

## 2020-01-07 DIAGNOSIS — R59.1 LYMPHADENOPATHY: ICD-10-CM

## 2020-01-07 DIAGNOSIS — A68.9 RECURRENT FEVER: ICD-10-CM

## 2020-01-07 DIAGNOSIS — C90.00 MULTIPLE MYELOMA NOT HAVING ACHIEVED REMISSION (H): ICD-10-CM

## 2020-01-07 DIAGNOSIS — N17.9 AKI (ACUTE KIDNEY INJURY) (H): ICD-10-CM

## 2020-01-07 PROCEDURE — 36415 COLL VENOUS BLD VENIPUNCTURE: CPT

## 2020-01-07 PROCEDURE — G0463 HOSPITAL OUTPT CLINIC VISIT: HCPCS

## 2020-01-07 RX ORDER — PREDNISONE 10 MG/1
30 TABLET ORAL DAILY
Qty: 90 TABLET | Refills: 0 | Status: SHIPPED | OUTPATIENT
Start: 2020-01-07 | End: 2020-01-14

## 2020-01-07 RX ORDER — FLUCONAZOLE 200 MG/1
200 TABLET ORAL DAILY
Qty: 30 TABLET | Refills: 1 | Status: SHIPPED | OUTPATIENT
Start: 2020-01-07 | End: 2020-02-04

## 2020-01-07 ASSESSMENT — PAIN SCALES - GENERAL: PAINLEVEL: NO PAIN (0)

## 2020-01-07 NOTE — NURSING NOTE
"Oncology Rooming Note    January 7, 2020 9:50 AM   Angel Yanez is a 59 year old male who presents for:    Chief Complaint   Patient presents with     Blood Draw     Labs drawn via VPTby RN in lab. VS taken. Pt checked in for next appt     RECHECK     PT is here for a rtn for MM     Initial Vitals: Blood Pressure 138/81 (BP Location: Right arm, Patient Position: Sitting, Cuff Size: Adult Regular)   Pulse 85   Temperature 98.1  F (36.7  C) (Oral)   Respiration 16   Weight 74.2 kg (163 lb 8 oz)   Oxygen Saturation 100%   Body Mass Index 23.46 kg/m   Estimated body mass index is 23.46 kg/m  as calculated from the following:    Height as of 12/16/19: 1.778 m (5' 10\").    Weight as of this encounter: 74.2 kg (163 lb 8 oz). Body surface area is 1.91 meters squared.  No Pain (0) Comment: Data Unavailable   No LMP for male patient.  Allergies reviewed: Yes  Medications reviewed: Yes    Medications: Medication refills not needed today.  Pharmacy name entered into EPIC:    [facility] - [place], [place] - [address]  [facility] - [place], [place] - [address]    Clinical concerns: none       Shoshana Salazar MA            "

## 2020-01-07 NOTE — PROGRESS NOTES
BMT Daily Progress Note        Patient ID:  Angel Yanez is a 58 yo man D+235 s/p 2nd autologous transplant for MM.   S/p 2 readmissions for FUO (10/16-10/23; 9/23-9/29/19). Readmitted on 12/9/2019 with return of fevers despite celebrex, FARZANA, worsening sob and return of rash         Diagnosis MM Multiple myeloma  HCT Type Autologous    Prep Regimen Cytoxan  Melphalan   Donor Source No data was found    GVHD Prophylaxis No  Primary BMT Provider Naveed Ortiz was admitted on 12/9/2019 for FARZANA and increased sob with chronic cough.     INTERVAL  HISTORY   Returns for follow up. Feeling well, has the most energy he has had in a while. No new medical complaints and fevers have remained at bay since starting prednisone. Appetite is good and weight is slowly improving. Denies any cough, congestion, or other URI symptoms. No n/v/d.     Review of Systems: 8 point ROS negative except as noted above.        PHYSICAL EXAM       Blood pressure 138/81, pulse 85, temperature 98.1  F (36.7  C), temperature source Oral, resp. rate 16, weight 74.2 kg (163 lb 8 oz), SpO2 100 %.    Wt Readings from Last 4 Encounters:   01/07/20 74.2 kg (163 lb 8 oz)   01/03/20 73.3 kg (161 lb 9.6 oz)   12/31/19 73.9 kg (162 lb 14.4 oz)   12/27/19 73.3 kg (161 lb 11.2 oz)     Hypertensive last several visits.   General: NAD   Eyes: FARRAH, sclera anicteric   Nose/Mouth/Throat: OP clear, buccal mucosa moist, no ulcerations   Lungs:  CTAB without rales, rhonchi, or wheezes  Cardiovascular: RRR, no M/R/G   Abdominal/Rectal: +BS, soft, NT, ND  Lymphatics: no edema  Skin: no rashes or petechiae         LABS AND IMAGING - PAST 24 HOURS     Lab Results   Component Value Date    WBC 2.8 (L) 01/07/2020    ANEU 2.2 01/07/2020    HGB 7.8 (L) 01/07/2020    HCT 23.1 (L) 01/07/2020    PLT 21 (LL) 01/07/2020     01/07/2020    POTASSIUM 4.0 01/07/2020    CHLORIDE 108 01/07/2020    CO2 26 01/07/2020     (H) 01/07/2020    BUN 28 01/07/2020    CR 0.90  01/07/2020    MAG 2.2 01/03/2020    INR 1.53 (H) 12/09/2019    BILITOTAL 0.7 01/03/2020    AST 19 01/03/2020    ALT 37 01/03/2020    ALKPHOS 105 01/03/2020    PROTTOTAL 7.9 01/03/2020    ALBUMIN 4.3 01/03/2020       I have assessed all abnormal lab values for their clinical significance and any values considered clinically significant have been addressed in the assessment and plan.       OVERALL PLAN   Angel Yanez is a 60 yo man D+235 s/p 2nd autologous transplant for MM.   S/p 2 readmissions for FUO (10/16-10/23; 9/23-9/29/19).      1.  BMT/MM:   Slow engraftment; cell dose was only 0.639x10^6. ([facility] [place] - known poor cell dose as failed chemo-mobilization 1/2019.)   - day +180 bone marrow biopsy 11/6/19 which showed no morphologic or immunophenotypic evidence of plasma cell neoplasm. His light chains were not elevated and he had no monoclonal protein in the serum.  He is in clinical remission.  - PET in 8/2019 clear.   - PB FLOW 11/23: No overt aberrant immunophenotype on T cells.  Rare to absent mature B cells and plasma cells.      2.  HEME: Keep Hgb>8g/dL, plt >10 .  - Retic up to 2.9%, WBC continuing to trend up (2.8), and plts stable to improved at 21k (received plts 1/3/19).   - Hgb 7.8- pt asymptomatic and no active type and screen- will plan for blood at next visit. -   -  Last GCSF on 12/13/2019 Give prn.     - Persistent thrombocytopenia: BM 11/6/19 showed nearly 0 platelet precursors. Trial of promacta not helpful. Stopped 12/6/19.      3.  ID:   FUOs: Extensive ID work up- no etiology identified. Now afebrile since starting Pred.   - 12/13 EBUS bx pending (many infectious cultures repeated)- No actual tissue obtained.  -12/9 RVP=neg  -12/9 CT chest is stable with GGO better. No antibiotics given during admission because do not feel like fevers are infection.  - Per Dr. Lucio, continue pred 30mg daily through 1/14 then start slow taper over 5-6 weeks.     Prophy: nithya ELENA (pred),  fluconazole, pentamidine (12/17) Give at next visit.    t cell subset, Absolute VV8=265.   - influenza vaccination given 11/26/19        4. Pulm:  SOB resolved.   - 10/17: Echo with preserved EF, no evidence of right heart strain. Repeat echo 11/14 stable.  - 10/17 VQ scan neg for PE     5. GI:  no Complaints.      6.  FEN/Renal:   Cr and lytes WNL     7.  Mood: Continue Paxil.     8. 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 Resolved. Recurrence 12/9- resolved with Triamcinolone TID.    9. CV: Steroid induced hypertension  - start 5mg lisinopril PO daily.       10. Dispo:  After discussion with Dr Mukherjee, looking for common cause.Some kind of auto immune process DDX:  Sarcoidosis? Underwent EBUS FNA which was negative for granulomas however unlikely you could see this on just fluid aspirate so would not consider   - now on prednisone 30mg/day (as above)       RTC: 1/10 for labs, provider visit and infusion for RBCs and possible platelets. Pt aware to call with concerns for worsening SOB, lightheadedness or dizziness.     1/14 for labs, provider visit and infusion for possible plts.     Fei Meng PA-C  [phone]

## 2020-01-07 NOTE — NURSING NOTE
Chief Complaint   Patient presents with     Blood Draw     Labs drawn via Joann RN in lab. VS taken. Pt checked in for next appt     Labs collected from venipuncture by RN. Vitals taken. Checked in for appointment(s).    Leslie MAXWELL RN PHN BSN  BMT/Oncology Lab

## 2020-01-10 ENCOUNTER — INFUSION THERAPY VISIT (OUTPATIENT)
Dept: TRANSPLANT | Facility: CLINIC | Age: 60
End: 2020-01-10
Attending: INTERNAL MEDICINE
Payer: COMMERCIAL

## 2020-01-10 ENCOUNTER — APPOINTMENT (OUTPATIENT)
Dept: LAB | Facility: CLINIC | Age: 60
End: 2020-01-10
Attending: INTERNAL MEDICINE
Payer: COMMERCIAL

## 2020-01-10 VITALS
SYSTOLIC BLOOD PRESSURE: 130 MMHG | RESPIRATION RATE: 14 BRPM | HEART RATE: 77 BPM | OXYGEN SATURATION: 99 % | TEMPERATURE: 98 F | DIASTOLIC BLOOD PRESSURE: 86 MMHG

## 2020-01-10 VITALS
TEMPERATURE: 97.9 F | RESPIRATION RATE: 18 BRPM | OXYGEN SATURATION: 99 % | HEART RATE: 81 BPM | WEIGHT: 164 LBS | SYSTOLIC BLOOD PRESSURE: 118 MMHG | DIASTOLIC BLOOD PRESSURE: 71 MMHG | BODY MASS INDEX: 23.53 KG/M2

## 2020-01-10 DIAGNOSIS — L40.50 PSORIASIS WITH ARTHROPATHY (H): ICD-10-CM

## 2020-01-10 DIAGNOSIS — C90.01 MULTIPLE MYELOMA IN REMISSION (H): Primary | ICD-10-CM

## 2020-01-10 DIAGNOSIS — Z94.81 STATUS POST BONE MARROW TRANSPLANT (H): Primary | ICD-10-CM

## 2020-01-10 LAB
ABO + RH BLD: NORMAL
ABO + RH BLD: NORMAL
ALBUMIN SERPL-MCNC: 4 G/DL (ref 3.4–5)
ALP SERPL-CCNC: 81 U/L (ref 40–150)
ALT SERPL W P-5'-P-CCNC: 49 U/L (ref 0–70)
ANION GAP SERPL CALCULATED.3IONS-SCNC: 9 MMOL/L (ref 3–14)
AST SERPL W P-5'-P-CCNC: 27 U/L (ref 0–45)
BASOPHILS # BLD AUTO: 0 10E9/L (ref 0–0.2)
BASOPHILS NFR BLD AUTO: 0 %
BILIRUB SERPL-MCNC: 0.7 MG/DL (ref 0.2–1.3)
BLD GP AB SCN SERPL QL: NORMAL
BLD PROD TYP BPU: NORMAL
BLD PROD TYP BPU: NORMAL
BLD UNIT ID BPU: 0
BLOOD BANK CMNT PATIENT-IMP: NORMAL
BLOOD PRODUCT CODE: NORMAL
BPU ID: NORMAL
BUN SERPL-MCNC: 23 MG/DL (ref 7–30)
CALCIUM SERPL-MCNC: 9.1 MG/DL (ref 8.5–10.1)
CHLORIDE SERPL-SCNC: 106 MMOL/L (ref 94–109)
CO2 SERPL-SCNC: 24 MMOL/L (ref 20–32)
CREAT SERPL-MCNC: 0.91 MG/DL (ref 0.66–1.25)
DIFFERENTIAL METHOD BLD: ABNORMAL
EOSINOPHIL # BLD AUTO: 0 10E9/L (ref 0–0.7)
EOSINOPHIL NFR BLD AUTO: 0.7 %
ERYTHROCYTE [DISTWIDTH] IN BLOOD BY AUTOMATED COUNT: 26.1 % (ref 10–15)
GFR SERPL CREATININE-BSD FRML MDRD: 90 ML/MIN/{1.73_M2}
GLUCOSE SERPL-MCNC: 89 MG/DL (ref 70–99)
HCT VFR BLD AUTO: 23 % (ref 40–53)
HGB BLD-MCNC: 7.4 G/DL (ref 13.3–17.7)
IMM GRANULOCYTES # BLD: 0 10E9/L (ref 0–0.4)
IMM GRANULOCYTES NFR BLD: 1.4 %
LYMPHOCYTES # BLD AUTO: 0.6 10E9/L (ref 0.8–5.3)
LYMPHOCYTES NFR BLD AUTO: 21 %
MCH RBC QN AUTO: 34.6 PG (ref 26.5–33)
MCHC RBC AUTO-ENTMCNC: 32.2 G/DL (ref 31.5–36.5)
MCV RBC AUTO: 108 FL (ref 78–100)
MONOCYTES # BLD AUTO: 0.3 10E9/L (ref 0–1.3)
MONOCYTES NFR BLD AUTO: 11 %
NEUTROPHILS # BLD AUTO: 1.9 10E9/L (ref 1.6–8.3)
NEUTROPHILS NFR BLD AUTO: 65.9 %
NRBC # BLD AUTO: 0 10*3/UL
NRBC BLD AUTO-RTO: 0 /100
NUM BPU REQUESTED: 1
PLATELET # BLD AUTO: 19 10E9/L (ref 150–450)
PLATELET # BLD EST: ABNORMAL 10*3/UL
POTASSIUM SERPL-SCNC: 4 MMOL/L (ref 3.4–5.3)
PROT SERPL-MCNC: 7.3 G/DL (ref 6.8–8.8)
RBC # BLD AUTO: 2.14 10E12/L (ref 4.4–5.9)
SODIUM SERPL-SCNC: 139 MMOL/L (ref 133–144)
SPECIMEN EXP DATE BLD: NORMAL
TRANSFUSION STATUS PATIENT QL: NORMAL
TRANSFUSION STATUS PATIENT QL: NORMAL
WBC # BLD AUTO: 2.9 10E9/L (ref 4–11)

## 2020-01-10 PROCEDURE — 86901 BLOOD TYPING SEROLOGIC RH(D): CPT | Performed by: PHYSICIAN ASSISTANT

## 2020-01-10 PROCEDURE — 80053 COMPREHEN METABOLIC PANEL: CPT | Performed by: PHYSICIAN ASSISTANT

## 2020-01-10 PROCEDURE — 36430 TRANSFUSION BLD/BLD COMPNT: CPT

## 2020-01-10 PROCEDURE — 86900 BLOOD TYPING SEROLOGIC ABO: CPT | Performed by: PHYSICIAN ASSISTANT

## 2020-01-10 PROCEDURE — 86850 RBC ANTIBODY SCREEN: CPT | Performed by: PHYSICIAN ASSISTANT

## 2020-01-10 PROCEDURE — 40000556 ZZH STATISTIC PERIPHERAL IV START W US GUIDANCE: Mod: ZF

## 2020-01-10 PROCEDURE — P9040 RBC LEUKOREDUCED IRRADIATED: HCPCS | Performed by: INTERNAL MEDICINE

## 2020-01-10 PROCEDURE — 85025 COMPLETE CBC W/AUTO DIFF WBC: CPT | Performed by: PHYSICIAN ASSISTANT

## 2020-01-10 ASSESSMENT — PAIN SCALES - GENERAL: PAINLEVEL: NO PAIN (0)

## 2020-01-10 NOTE — PROGRESS NOTES
Infusion Nursing Note:  Angel Yanez presents today for RBCs.    Patient seen by provider today: Yes: Fei Meng   present during visit today: Not Applicable.    Note: Patient arrives for 1u RBCs to keep > 8.  No premedications needed and patient tolerated transfusion without incident.  No further replacement needs today.    Intravenous Access:  Peripheral IV placed.    Treatment Conditions:  Lab Results   Component Value Date    HGB 7.4 01/10/2020     Lab Results   Component Value Date    WBC 2.9 01/10/2020      Lab Results   Component Value Date    ANEU 1.9 01/10/2020     Lab Results   Component Value Date    PLT 19 01/10/2020      Results reviewed, labs MET treatment parameters, ok to proceed with treatment.      Post Infusion Assessment:  Patient tolerated infusion without incident.  No evidence of extravasations.  Access discontinued per protocol.       Discharge Plan:   Patient discharged in stable condition accompanied by: self.  Departure Mode: Ambulatory.  Patient aware of follow up appointments.    Isamar Tam RN, BMTCN

## 2020-01-10 NOTE — NURSING NOTE
Chief Complaint   Patient presents with     Infusion     RBC transfusion, hx MM s/p transplant.

## 2020-01-10 NOTE — NURSING NOTE
Chief Complaint   Patient presents with     Blood Draw     Labs drawn via PIV by Vascular nurse. Vitals done. Pt. checked in for infusion.        Ileana Gas, CMA

## 2020-01-10 NOTE — PROGRESS NOTES
BMT Daily Progress Note        Patient ID:  Angel Yanez is a 60 yo man D+238 s/p 2nd autologous transplant for MM.   S/p 2 readmissions for FUO (10/16-10/23; 9/23-9/29/19). Readmitted on 12/9/2019 with return of fevers despite celebrex, FARZANA, worsening sob and return of rash         Diagnosis MM Multiple myeloma  HCT Type Autologous    Prep Regimen Cytoxan  Melphalan   Donor Source No data was found    GVHD Prophylaxis No  Primary BMT Provider Naveed Ortiz was admitted on 12/9/2019 for FARZANA and increased sob with chronic cough.     INTERVAL  HISTORY   Returns for follow up. Feeling well but does feel fatigued which he relates to his low hgb. No other new medical complaints. No further fevers, chills, cough, congestion or infectious symptoms. Eating well and weight is trending up.      Review of Systems: 6 point ROS negative except as noted above.        PHYSICAL EXAM       Blood pressure 118/71, pulse 81, temperature 97.9  F (36.6  C), temperature source Oral, resp. rate 18, weight 74.4 kg (164 lb), SpO2 99 %.    Wt Readings from Last 4 Encounters:   01/10/20 74.4 kg (164 lb)   01/07/20 74.2 kg (163 lb 8 oz)   01/03/20 73.3 kg (161 lb 9.6 oz)   12/31/19 73.9 kg (162 lb 14.4 oz)     Hypertensive last several visits.   General: NAD   Eyes: FARRAH, sclera anicteric   Nose/Mouth/Throat: OP clear, buccal mucosa moist, no ulcerations   Lungs:  CTAB without rales, rhonchi, or wheezes  Cardiovascular: RRR, no M/R/G   Abdominal/Rectal: +BS, soft, NT, ND  Lymphatics: no edema  Skin: no rashes or petechiae         LABS AND IMAGING - PAST 24 HOURS     Lab Results   Component Value Date    WBC 2.8 (L) 01/07/2020    ANEU 2.2 01/07/2020    HGB 7.8 (L) 01/07/2020    HCT 23.1 (L) 01/07/2020    PLT 21 (LL) 01/07/2020     01/07/2020    POTASSIUM 4.0 01/07/2020    CHLORIDE 108 01/07/2020    CO2 26 01/07/2020     (H) 01/07/2020    BUN 28 01/07/2020    CR 0.90 01/07/2020    MAG 2.2 01/03/2020    INR 1.53 (H)  12/09/2019    BILITOTAL 0.7 01/03/2020    AST 19 01/03/2020    ALT 37 01/03/2020    ALKPHOS 105 01/03/2020    PROTTOTAL 7.9 01/03/2020    ALBUMIN 4.3 01/03/2020       I have assessed all abnormal lab values for their clinical significance and any values considered clinically significant have been addressed in the assessment and plan.       OVERALL PLAN   Angel Yanez is a 60 yo man D+238 s/p 2nd autologous transplant for MM.   S/p 2 readmissions for FUO (10/16-10/23; 9/23-9/29/19).      1.  BMT/MM:   Slow engraftment; cell dose was only 0.639x10^6. ([facility] [place] - known poor cell dose as failed chemo-mobilization 1/2019.)   - day +180 bone marrow biopsy 11/6/19 which showed no morphologic or immunophenotypic evidence of plasma cell neoplasm. His light chains were not elevated and he had no monoclonal protein in the serum.  He is in clinical remission.  - PET in 8/2019 clear.   - PB FLOW 11/23: No overt aberrant immunophenotype on T cells.  Rare to absent mature B cells and plasma cells.      2.  HEME: Keep Hgb>8g/dL, plt >10 .  - Retic up to 2.9%, WBC continuing to trend up (2.9), and plts overall stable at 19k (last plts 1/3/19).   - Hgb 7.4- give 1u RBCs today.    -  Last GCSF on 12/13/2019 Give prn.     - Persistent thrombocytopenia: BM 11/6/19 showed nearly 0 platelet precursors. Trial of promacta not helpful. Stopped 12/6/19.      3.  ID:   FUOs: Extensive ID work up- no etiology identified. Now afebrile since starting Pred.   - 12/13 EBUS bx with ngtd.- No actual tissue obtained.  -12/9 RVP=neg  -12/9 CT chest is stable with GGO better. No antibiotics given during admission because do not feel like fevers are infection.  - Per Dr. Lucio, continue pred 30mg daily through 1/14 then start slow taper over 5-6 weeks.     Prophy: ACV, azithro (pred), fluconazole, pentamidine (12/17) Give at next visit.    t cell subset, Absolute OE9=713.   - influenza vaccination given 11/26/19        4. Pulm:  SOB resolved.    - 10/17: Echo with preserved EF, no evidence of right heart strain. Repeat echo 11/14 stable.  - 10/17 VQ scan neg for PE     5. GI:  no Complaints.      6.  FEN/Renal:   Cr and lytes WNL     7.  Mood: Continue Paxil.     8. 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 Resolved. Recurrence 12/9- resolved with Triamcinolone TID.    9. CV: Steroid induced hypertension  - start 5mg lisinopril PO daily.       10. Dispo:  After discussion with Dr Mukherjee, looking for common cause.Some kind of auto immune process DDX:  Sarcoidosis? Underwent EBUS FNA which was negative for granulomas however unlikely you could see this on just fluid aspirate so would not consider   - now on prednisone 30mg/day (as above)       RTC: 1/14 for labs, provider visit, pentamidine and infusion for possible plts and or RBCs.     Fei Meng PA-C  [phone]

## 2020-01-12 LAB
MYCOBACTERIUM SPEC CULT: NORMAL
MYCOBACTERIUM SPEC CULT: NORMAL
SPECIMEN SOURCE: NORMAL

## 2020-01-13 LAB
BACTERIA SPEC CULT: NORMAL
FUNGUS SPEC CULT: NORMAL
FUNGUS SPEC CULT: NORMAL
SPECIMEN SOURCE: NORMAL

## 2020-01-14 ENCOUNTER — APPOINTMENT (OUTPATIENT)
Dept: LAB | Facility: CLINIC | Age: 60
End: 2020-01-14
Attending: INTERNAL MEDICINE
Payer: COMMERCIAL

## 2020-01-14 ENCOUNTER — ONCOLOGY VISIT (OUTPATIENT)
Dept: TRANSPLANT | Facility: CLINIC | Age: 60
End: 2020-01-14
Attending: INTERNAL MEDICINE
Payer: COMMERCIAL

## 2020-01-14 VITALS
RESPIRATION RATE: 16 BRPM | HEIGHT: 70 IN | TEMPERATURE: 96.8 F | SYSTOLIC BLOOD PRESSURE: 144 MMHG | BODY MASS INDEX: 23.95 KG/M2 | WEIGHT: 167.3 LBS | DIASTOLIC BLOOD PRESSURE: 83 MMHG | HEART RATE: 76 BPM | OXYGEN SATURATION: 100 %

## 2020-01-14 DIAGNOSIS — A68.9 RECURRENT FEVER: ICD-10-CM

## 2020-01-14 DIAGNOSIS — C90.01 MULTIPLE MYELOMA IN REMISSION (H): ICD-10-CM

## 2020-01-14 DIAGNOSIS — C90.00 MULTIPLE MYELOMA NOT HAVING ACHIEVED REMISSION (H): ICD-10-CM

## 2020-01-14 DIAGNOSIS — R59.1 LYMPHADENOPATHY: ICD-10-CM

## 2020-01-14 DIAGNOSIS — L40.50 PSORIASIS WITH ARTHROPATHY (H): Primary | ICD-10-CM

## 2020-01-14 DIAGNOSIS — N17.9 AKI (ACUTE KIDNEY INJURY) (H): ICD-10-CM

## 2020-01-14 DIAGNOSIS — I15.8 OTHER SECONDARY HYPERTENSION: ICD-10-CM

## 2020-01-14 PROCEDURE — 94642 AEROSOL INHALATION TREATMENT: CPT

## 2020-01-14 PROCEDURE — 25000125 ZZHC RX 250: Mod: ZF | Performed by: PHYSICIAN ASSISTANT

## 2020-01-14 PROCEDURE — G0463 HOSPITAL OUTPT CLINIC VISIT: HCPCS | Mod: ZF

## 2020-01-14 PROCEDURE — 36415 COLL VENOUS BLD VENIPUNCTURE: CPT

## 2020-01-14 RX ORDER — PENTAMIDINE ISETHIONATE 300 MG/300MG
300 INHALANT RESPIRATORY (INHALATION)
Status: CANCELLED
Start: 2020-01-18

## 2020-01-14 RX ORDER — HEPARIN SODIUM,PORCINE 10 UNIT/ML
5 VIAL (ML) INTRAVENOUS
Status: CANCELLED | OUTPATIENT
Start: 2020-01-18

## 2020-01-14 RX ORDER — AZITHROMYCIN 250 MG/1
250 TABLET, FILM COATED ORAL DAILY
Qty: 30 TABLET | Refills: 0 | Status: SHIPPED | OUTPATIENT
Start: 2020-01-14 | End: 2020-02-21

## 2020-01-14 RX ORDER — PREDNISONE 5 MG/1
TABLET ORAL
Qty: 45 TABLET | Refills: 0 | Status: SHIPPED | OUTPATIENT
Start: 2020-01-14 | End: 2020-02-21

## 2020-01-14 RX ORDER — ALBUTEROL SULFATE 0.83 MG/ML
2.5 SOLUTION RESPIRATORY (INHALATION)
Status: DISCONTINUED | OUTPATIENT
Start: 2020-01-14 | End: 2020-01-14 | Stop reason: HOSPADM

## 2020-01-14 RX ORDER — LISINOPRIL 5 MG/1
5 TABLET ORAL DAILY
Qty: 30 TABLET | Refills: 1 | Status: SHIPPED | OUTPATIENT
Start: 2020-01-14 | End: 2020-02-21

## 2020-01-14 RX ORDER — PREDNISONE 10 MG/1
TABLET ORAL
Qty: 90 TABLET | Refills: 0 | COMMUNITY
Start: 2020-01-14 | End: 2020-02-21

## 2020-01-14 RX ORDER — ALBUTEROL SULFATE 5 MG/ML
2.5 SOLUTION RESPIRATORY (INHALATION)
Status: CANCELLED
Start: 2020-01-18

## 2020-01-14 RX ADMIN — PENTAMIDINE ISETHIONATE 300 MG: 300 INJECTION, POWDER, LYOPHILIZED, FOR SOLUTION INTRAMUSCULAR; INTRAVENOUS at 09:20

## 2020-01-14 RX ADMIN — ALBUTEROL SULFATE 2.5 MG: 2.5 SOLUTION RESPIRATORY (INHALATION) at 09:07

## 2020-01-14 ASSESSMENT — PAIN SCALES - GENERAL: PAINLEVEL: NO PAIN (0)

## 2020-01-14 ASSESSMENT — MIFFLIN-ST. JEOR: SCORE: 1570.12

## 2020-01-14 NOTE — PHARMACY-TAPERING SERVICE
Prednisone taper    [place] Monday Tuesday Wednesday Thursday Friday Saturday   12-Nael-2020 13-Nael-2020 14-Nael-2020 15-Nael-2020 16-Nael-2020 17-Nael-2020 18-Nael-2020   30 mg 30 mg 30 mg 25 mg 25 mg 25 mg 25 mg   19-Jan-2020 20-Jan-2020 21-Jan-2020 22-Jan-2020 23-Jan-2020 24-Jan-2020 25-Jan-2020   25 mg 25 mg 25 mg 20 mg 20 mg 20 mg 20 mg   26-Jan-2020 27-Jan-2020 28-Jan-2020 29-Jan-2020 30-Jan-2020 31-Jan-2020 1-Feb-2020   20 mg 20 mg 20 mg 15 mg 15 mg 15 mg 15 mg   2-Feb-2020 3-Feb-2020 4-Feb-2020 5-Feb-2020 6-Feb-2020 7-Feb-2020 8-Feb-2020   15 mg 15 mg 15 mg 10 mg 10 mg 10 mg 10 mg   9-Feb-2020 10-Feb-2020 11-Feb-2020 12-Feb-2020 13-Feb-2020 14-Feb-2020 15-Feb-2020   10 mg 10 mg 10 mg 5 mg 5 mg 5 mg 5 mg   16-Feb-2020 17-Feb-2020 18-Feb-2020 19-Feb-2020 20-Feb-2020 21-Feb-2020 22-Feb-2020   5 mg 0 5 mg 0 5 mg STOP

## 2020-01-14 NOTE — NURSING NOTE
Albuterol was inhaled prior to Pentamidine inhalation.   Pentamidine 300 mg inhalation was started at 09:22. Patient tolerated procedure with no incident.  Yesica Duncan MA January 14, 2020

## 2020-01-14 NOTE — NURSING NOTE
"Oncology Rooming Note    January 14, 2020 8:54 AM   Angel Yanez is a 59 year old male who presents for:    Chief Complaint   Patient presents with     Blood Draw     labs drawn with piv start by rn.  vs taken     RECHECK     Return: Multiple myeloma     Initial Vitals: BP (!) 144/83 (BP Location: Right arm, Patient Position: Sitting, Cuff Size: Adult Regular)   Pulse 76   Temp 96.8  F (36  C) (Oral)   Resp 16   Ht 1.778 m (5' 10\")   Wt 75.9 kg (167 lb 4.8 oz)   SpO2 100%   BMI 24.01 kg/m   Estimated body mass index is 24.01 kg/m  as calculated from the following:    Height as of this encounter: 1.778 m (5' 10\").    Weight as of this encounter: 75.9 kg (167 lb 4.8 oz). Body surface area is 1.94 meters squared.  No Pain (0) Comment: Data Unavailable   No LMP for male patient.  Allergies reviewed: Yes  Medications reviewed: Yes    Medications: MEDICATION REFILLS NEEDED TODAY. Provider was notified.  Pharmacy name entered into EPIC:    [facility] - [place], [place] - [address]  [facility] - [place], [place] - [address] [facility]    Clinical concerns: Refill Azithromycin, and Lisinopril.  Fei FERNANDO was notified.      Yesica Duncan CMA              "

## 2020-01-14 NOTE — PROGRESS NOTES
BMT Daily Progress Note        Patient ID:  Angel Yanez is a 58 yo man D+242 s/p 2nd autologous transplant for MM.   S/p 2 readmissions for FUO (10/16-10/23; 9/23-9/29/19). Readmitted on 12/9/2019 with return of fevers despite celebrex, FARZANA, worsening sob and return of rash         Diagnosis MM Multiple myeloma  HCT Type Autologous    Prep Regimen Cytoxan  Melphalan   Donor Source No data was found    GVHD Prophylaxis No  Primary BMT Provider Naveed Ortiz was admitted on 12/9/2019 for FARZANA and increased sob with chronic cough.     INTERVAL  HISTORY   Returns for follow up. Had a good weekend. Energy level was better than usual, was even able to shovel some snow this morning. No new medical complaints. No fevers, chills, or infectious symptoms. Weight is up. No bleeding.     Review of Systems: 6 point ROS negative except as noted above.        PHYSICAL EXAM       Blood pressure (!) 144/83, pulse 76, temperature 96.8  F (36  C), temperature source Oral, resp. rate 16, weight 75.9 kg (167 lb 4.8 oz), SpO2 100 %.    Wt Readings from Last 4 Encounters:   01/14/20 75.9 kg (167 lb 4.8 oz)   01/10/20 74.4 kg (164 lb)   01/07/20 74.2 kg (163 lb 8 oz)   01/03/20 73.3 kg (161 lb 9.6 oz)     General: NAD   Eyes: FARRAH, sclera anicteric   Nose/Mouth/Throat: OP clear, buccal mucosa moist, no ulcerations   Lungs:  CTAB without rales, rhonchi, or wheezes  Cardiovascular: RRR, no M/R/G   Abdominal/Rectal: +BS, soft, NT, ND  Lymphatics: no edema  Skin: no rashes or petechiae         LABS AND IMAGING - PAST 24 HOURS     Lab Results   Component Value Date    WBC 2.9 (L) 01/10/2020    ANEU 1.9 01/10/2020    HGB 7.4 (L) 01/10/2020    HCT 23.0 (L) 01/10/2020    PLT 19 (LL) 01/10/2020     01/10/2020    POTASSIUM 4.0 01/10/2020    CHLORIDE 106 01/10/2020    CO2 24 01/10/2020    GLC 89 01/10/2020    BUN 23 01/10/2020    CR 0.91 01/10/2020    MAG 2.2 01/03/2020    INR 1.53 (H) 12/09/2019    BILITOTAL 0.7 01/10/2020    AST 27  01/10/2020    ALT 49 01/10/2020    ALKPHOS 81 01/10/2020    PROTTOTAL 7.3 01/10/2020    ALBUMIN 4.0 01/10/2020       I have assessed all abnormal lab values for their clinical significance and any values considered clinically significant have been addressed in the assessment and plan.       OVERALL PLAN   Angel Yanez is a 60 yo man D+242 s/p 2nd autologous transplant for MM.   S/p 2 readmissions for FUO (10/16-10/23; 9/23-9/29/19).      1.  BMT/MM:   Slow engraftment; cell dose was only 0.639x10^6. ([facility] [place] - known poor cell dose as failed chemo-mobilization 1/2019.)   - day +180 bone marrow biopsy 11/6/19 which showed no morphologic or immunophenotypic evidence of plasma cell neoplasm. His light chains were not elevated and he had no monoclonal protein in the serum.  He is in clinical remission.  - PET in 8/2019 clear.   - PB FLOW 11/23: No overt aberrant immunophenotype on T cells.  Rare to absent mature B cells and plasma cells.      2.  HEME: Keep Hgb>8g/dL, plt >10 .  - Retic up to 2.9%, Counts overall stable, last plt transfusion was 1/3 and last RBCs was 1/10   -  Last GCSF on 12/13/2019 Give prn.     - Persistent thrombocytopenia: BM 11/6/19 showed nearly 0 platelet precursors. Trial of promacta not helpful. Stopped 12/6/19.      3.  ID:   FUOs: Extensive ID work up- no etiology identified. Now afebrile since starting Pred.   - 12/13 EBUS bx with ngtd.- No actual tissue obtained.  -12/9 RVP=neg  -12/9 CT chest is stable with GGO better. No antibiotics given during admission because do not feel like fevers are infection.  - Per Dr. Lucio, start pred taper over 5-6 weeks (see schedule in Epic). Start tomorrow.    Prophy: ACV, azithro (pred), fluconazole, pentamidine (12/17) Given today.   t cell subset, Absolute XU1=296.   - influenza vaccination given 11/26/19        4. Pulm:  SOB resolved.   - 10/17: Echo with preserved EF, no evidence of right heart strain. Repeat echo 11/14 stable.  - 10/17  VQ scan neg for PE     5. GI:  no Complaints.      6.  FEN/Renal:   Cr and lytes WNL     7.  Mood: Continue Paxil.     8. 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 Resolved. Recurrence 12/9- resolved with Triamcinolone TID.    9. CV: Steroid induced hypertension  - start 5mg lisinopril PO daily. BPs slightly elevated but starting steroid taper, monitor.      10. Dispo:  FUOs- Some kind of auto immune process DDX:  Sarcoidosis? Underwent EBUS FNA which was negative for granulomas however unlikely you could see this on just fluid aspirate so would not consider   - on prednisone 30mg/day (as above)- start taper as above.        RTC: 1/17 for labs, provider visit to see how pt is on steroid taper, and infusion for possible plts and or RBCs. If stable, consider spacing out visits.     Fei Meng PA-C  [phone]

## 2020-01-17 ENCOUNTER — ONCOLOGY VISIT (OUTPATIENT)
Dept: TRANSPLANT | Facility: CLINIC | Age: 60
End: 2020-01-17
Attending: STUDENT IN AN ORGANIZED HEALTH CARE EDUCATION/TRAINING PROGRAM
Payer: COMMERCIAL

## 2020-01-17 ENCOUNTER — APPOINTMENT (OUTPATIENT)
Dept: LAB | Facility: CLINIC | Age: 60
End: 2020-01-17
Attending: STUDENT IN AN ORGANIZED HEALTH CARE EDUCATION/TRAINING PROGRAM
Payer: COMMERCIAL

## 2020-01-17 VITALS
OXYGEN SATURATION: 100 % | WEIGHT: 167 LBS | HEART RATE: 81 BPM | TEMPERATURE: 97.9 F | DIASTOLIC BLOOD PRESSURE: 76 MMHG | SYSTOLIC BLOOD PRESSURE: 137 MMHG | BODY MASS INDEX: 23.96 KG/M2 | RESPIRATION RATE: 16 BRPM

## 2020-01-17 DIAGNOSIS — L40.50 PSORIASIS WITH ARTHROPATHY (H): ICD-10-CM

## 2020-01-17 DIAGNOSIS — C90.01 MULTIPLE MYELOMA IN REMISSION (H): Primary | ICD-10-CM

## 2020-01-17 LAB
ABO + RH BLD: NORMAL
ABO + RH BLD: NORMAL
ANION GAP SERPL CALCULATED.3IONS-SCNC: 5 MMOL/L (ref 3–14)
BASOPHILS # BLD AUTO: 0 10E9/L (ref 0–0.2)
BASOPHILS NFR BLD AUTO: 0.6 %
BLD GP AB SCN SERPL QL: NORMAL
BLOOD BANK CMNT PATIENT-IMP: NORMAL
BUN SERPL-MCNC: 18 MG/DL (ref 7–30)
CALCIUM SERPL-MCNC: 9.1 MG/DL (ref 8.5–10.1)
CHLORIDE SERPL-SCNC: 108 MMOL/L (ref 94–109)
CO2 SERPL-SCNC: 28 MMOL/L (ref 20–32)
CREAT SERPL-MCNC: 0.9 MG/DL (ref 0.66–1.25)
DIFFERENTIAL METHOD BLD: ABNORMAL
EOSINOPHIL # BLD AUTO: 0 10E9/L (ref 0–0.7)
EOSINOPHIL NFR BLD AUTO: 0.6 %
ERYTHROCYTE [DISTWIDTH] IN BLOOD BY AUTOMATED COUNT: 25.8 % (ref 10–15)
GFR SERPL CREATININE-BSD FRML MDRD: >90 ML/MIN/{1.73_M2}
GLUCOSE SERPL-MCNC: 98 MG/DL (ref 70–99)
HCT VFR BLD AUTO: 25.9 % (ref 40–53)
HGB BLD-MCNC: 8.7 G/DL (ref 13.3–17.7)
IMM GRANULOCYTES # BLD: 0 10E9/L (ref 0–0.4)
IMM GRANULOCYTES NFR BLD: 0.6 %
LYMPHOCYTES # BLD AUTO: 0.5 10E9/L (ref 0.8–5.3)
LYMPHOCYTES NFR BLD AUTO: 26.6 %
MCH RBC QN AUTO: 35.8 PG (ref 26.5–33)
MCHC RBC AUTO-ENTMCNC: 33.6 G/DL (ref 31.5–36.5)
MCV RBC AUTO: 107 FL (ref 78–100)
MONOCYTES # BLD AUTO: 0.3 10E9/L (ref 0–1.3)
MONOCYTES NFR BLD AUTO: 14.7 %
NEUTROPHILS # BLD AUTO: 1 10E9/L (ref 1.6–8.3)
NEUTROPHILS NFR BLD AUTO: 56.9 %
NRBC # BLD AUTO: 0 10*3/UL
NRBC BLD AUTO-RTO: 0 /100
PLATELET # BLD AUTO: 18 10E9/L (ref 150–450)
POTASSIUM SERPL-SCNC: 4 MMOL/L (ref 3.4–5.3)
RBC # BLD AUTO: 2.43 10E12/L (ref 4.4–5.9)
SODIUM SERPL-SCNC: 142 MMOL/L (ref 133–144)
SPECIMEN EXP DATE BLD: NORMAL
WBC # BLD AUTO: 1.8 10E9/L (ref 4–11)

## 2020-01-17 PROCEDURE — 25000128 H RX IP 250 OP 636: Mod: ZF | Performed by: STUDENT IN AN ORGANIZED HEALTH CARE EDUCATION/TRAINING PROGRAM

## 2020-01-17 PROCEDURE — 86900 BLOOD TYPING SEROLOGIC ABO: CPT | Performed by: PHYSICIAN ASSISTANT

## 2020-01-17 PROCEDURE — G0463 HOSPITAL OUTPT CLINIC VISIT: HCPCS | Mod: 25

## 2020-01-17 PROCEDURE — 85025 COMPLETE CBC W/AUTO DIFF WBC: CPT | Performed by: PHYSICIAN ASSISTANT

## 2020-01-17 PROCEDURE — 86850 RBC ANTIBODY SCREEN: CPT | Performed by: PHYSICIAN ASSISTANT

## 2020-01-17 PROCEDURE — 96372 THER/PROPH/DIAG INJ SC/IM: CPT

## 2020-01-17 PROCEDURE — 36415 COLL VENOUS BLD VENIPUNCTURE: CPT

## 2020-01-17 PROCEDURE — 86901 BLOOD TYPING SEROLOGIC RH(D): CPT | Performed by: PHYSICIAN ASSISTANT

## 2020-01-17 PROCEDURE — 80048 BASIC METABOLIC PNL TOTAL CA: CPT | Performed by: PHYSICIAN ASSISTANT

## 2020-01-17 RX ORDER — ALBUTEROL SULFATE 5 MG/ML
2.5 SOLUTION RESPIRATORY (INHALATION)
Status: CANCELLED
Start: 2020-02-18

## 2020-01-17 RX ORDER — PENTAMIDINE ISETHIONATE 300 MG/300MG
300 INHALANT RESPIRATORY (INHALATION)
Status: CANCELLED
Start: 2020-02-18

## 2020-01-17 RX ORDER — HEPARIN SODIUM,PORCINE 10 UNIT/ML
5 VIAL (ML) INTRAVENOUS
Status: CANCELLED | OUTPATIENT
Start: 2020-02-18

## 2020-01-17 RX ADMIN — FILGRASTIM 480 MCG: 480 INJECTION, SOLUTION INTRAVENOUS; SUBCUTANEOUS at 09:21

## 2020-01-17 ASSESSMENT — PAIN SCALES - GENERAL: PAINLEVEL: NO PAIN (0)

## 2020-01-17 NOTE — NURSING NOTE
Chief Complaint   Patient presents with     Lab Only     venipuncture, vitals checked     PIV placed for infusion and labs  Eloisa King RN on 1/17/2020 at 8:41 AM

## 2020-01-17 NOTE — PROGRESS NOTES
BMT Daily Progress Note        Patient ID:  Angel Yanez is a 58 yo man D+245 s/p 2nd autologous transplant for MM.   S/p 2 readmissions for FUO (10/16-10/23; 9/23-9/29/19). Readmitted on 12/9/2019 with return of fevers despite celebrex, FARZANA, worsening sob and return of rash         Diagnosis MM Multiple myeloma  HCT Type Autologous    Prep Regimen Cytoxan  Melphalan   Donor Source No data was found    GVHD Prophylaxis No  [facility] for FARZANA and increased sob with chronic cough.     INTERVAL  HISTORY   Returns for count check. He states he had a slight bloody nose, used afrin and two kleenex tissues and it had ended. Feels it was because of air dryness. No other bleeding. No fevers, cough or congestion. No n/v/d.     Review of Systems: 6 point ROS negative except as noted above.        PHYSICAL EXAM       Blood pressure 137/76, pulse 81, temperature 97.9  F (36.6  C), resp. rate 16, weight 75.8 kg (167 lb), SpO2 100 %.    Wt Readings from Last 4 Encounters:   01/17/20 75.8 kg (167 lb)   01/14/20 75.9 kg (167 lb 4.8 oz)   01/10/20 74.4 kg (164 lb)   01/07/20 74.2 kg (163 lb 8 oz)     General: NAD   Eyes: FARRAH, sclera anicteric   Nose/Mouth/Throat: OP clear, buccal mucosa moist, no ulcerations   Lungs:  CTAB without rales, rhonchi, or wheezes  Cardiovascular: RRR, no M/R/G   Abdominal/Rectal: +BS, soft, NT, ND  Lymphatics: no edema  Skin: no rashes or petechiae         LABS AND IMAGING - PAST 24 HOURS     Lab Results   Component Value Date    WBC 1.8 (L) 01/17/2020    ANEU 1.0 (L) 01/17/2020    HGB 8.7 (L) 01/17/2020    HCT 25.9 (L) 01/17/2020    PLT 18 (LL) 01/17/2020     01/17/2020    POTASSIUM 4.0 01/17/2020    CHLORIDE 108 01/17/2020    CO2 28 01/17/2020    GLC 98 01/17/2020    BUN 18 01/17/2020    CR 0.90 01/17/2020    MAG 2.2 01/03/2020    INR 1.53 (H) 12/09/2019    BILITOTAL 0.6 01/14/2020    AST 24 01/14/2020    ALT 51 01/14/2020    ALKPHOS 75  01/14/2020    PROTTOTAL 7.1 01/14/2020    ALBUMIN 4.0 01/14/2020       I have assessed all abnormal lab values for their clinical significance and any values considered clinically significant have been addressed in the assessment and plan.       OVERALL PLAN   Angel Yanez is a 60 yo man D+245 s/p 2nd autologous transplant for MM.   S/p 2 readmissions for FUO (10/16-10/23; 9/23-9/29/19).      1.  BMT/MM:   Slow engraftment; cell dose was only 0.639x10^6. ([facility] [place] - known poor cell dose as failed chemo-mobilization 1/2019.)   - day +180 bone marrow biopsy 11/6/19 which showed no morphologic or immunophenotypic evidence of plasma cell neoplasm. His light chains were not elevated and he had no monoclonal protein in the serum.  He is in clinical remission.  - PET in 8/2019 clear.   - PB FLOW 11/23: No overt aberrant immunophenotype on T cells.  Rare to absent mature B cells and plasma cells.      2.  HEME: Keep Hgb>8g/dL, plt >10 .  - Retic up to 2.9%, Counts overall stable, last plt transfusion was 1/3 and last RBCs was 1/10   -  Last GCSF on 12/13/2019 Give today for ANC 1.0 and for a few days off.   - Epistaxis (minimal 1/16) ok to use afrin but not more than 3 consecutive days, pt to call with any new bleeding that does not immediately resolve.  - Persistent thrombocytopenia: BM 11/6/19 showed nearly 0 platelet precursors. Trial of promacta not helpful. Stopped 12/6/19.      3.  ID:   FUOs: Extensive ID work up- no etiology identified. Now afebrile since starting Pred.   - 12/13 EBUS bx with ngtd.- No actual tissue obtained.  -12/9 RVP=neg  -12/9 CT chest is stable with GGO better. No antibiotics given during admission because do not feel like fevers are infection.  - Per Dr. Lucio, start pred taper over 5-6 weeks (see schedule in Epic). Started this 1/15.    Prophy: ACV, azithro (pred), fluconazole, pentamidine (1/14)  t cell subset, Absolute UI0=644.   - influenza vaccination given 11/26/19        4.  Pulm:  SOB resolved.   - 10/17: Echo with preserved EF, no evidence of right heart strain. Repeat echo 11/14 stable.  - 10/17 VQ scan neg for PE     5. GI:  no Complaints.      6.  FEN/Renal:   Cr and lytes WNL     7.  Mood: Continue Paxil.     8. 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 Resolved. Recurrence 12/9- resolved with Triamcinolone TID.    9. CV: Steroid induced hypertension  - start 5mg lisinopril PO daily. BP stable     10. Dispo:  FUOs- Some kind of auto immune process DDX:  Sarcoidosis? Underwent EBUS FNA which was negative for granulomas however unlikely you could see this on just fluid aspirate so would not consider   - on prednisone 30mg/day (as above)- on taper as above.        RTC: next week Tues or Wednesday  Sooner for bleeding, fevers, new symptoms  Continue pred taper, no medication changes today    Mony Pitts PAC  [phone]

## 2020-01-17 NOTE — NURSING NOTE
"Oncology Rooming Note    January 17, 2020 8:55 AM   Angel Yanez is a 59 year old male who presents for:    Chief Complaint   Patient presents with     Lab Only     venipuncture, vitals checked     RECHECK     Pt here for routine visit with Provider, Labs, and possible Infusion.      Initial Vitals: /76   Pulse 81   Temp 97.9  F (36.6  C)   Resp 16   Wt 75.8 kg (167 lb)   SpO2 100%   BMI 23.96 kg/m   Estimated body mass index is 23.96 kg/m  as calculated from the following:    Height as of 1/14/20: 1.778 m (5' 10\").    Weight as of this encounter: 75.8 kg (167 lb). Body surface area is 1.93 meters squared.  No Pain (0) Comment: Data Unavailable   No LMP for male patient.  Allergies reviewed: Yes  Medications reviewed: Yes    Medications: Medication refills not needed today.  Pharmacy name entered into EPIC:    [facility] - [place], [place] - [address] [facility] - [place], [place] - [address]    Clinical concerns: JUAN MANUEL Coker RN              "

## 2020-01-21 ENCOUNTER — ONCOLOGY VISIT (OUTPATIENT)
Dept: TRANSPLANT | Facility: CLINIC | Age: 60
End: 2020-01-21
Attending: PHYSICIAN ASSISTANT
Payer: COMMERCIAL

## 2020-01-21 ENCOUNTER — APPOINTMENT (OUTPATIENT)
Dept: LAB | Facility: CLINIC | Age: 60
End: 2020-01-21
Attending: PHYSICIAN ASSISTANT
Payer: COMMERCIAL

## 2020-01-21 VITALS
RESPIRATION RATE: 16 BRPM | WEIGHT: 168.8 LBS | BODY MASS INDEX: 24.22 KG/M2 | OXYGEN SATURATION: 100 % | SYSTOLIC BLOOD PRESSURE: 140 MMHG | TEMPERATURE: 97.8 F | HEART RATE: 104 BPM | DIASTOLIC BLOOD PRESSURE: 81 MMHG

## 2020-01-21 DIAGNOSIS — C90.01 MULTIPLE MYELOMA IN REMISSION (H): Primary | ICD-10-CM

## 2020-01-21 DIAGNOSIS — L40.50 PSORIASIS WITH ARTHROPATHY (H): ICD-10-CM

## 2020-01-21 LAB
ABO + RH BLD: NORMAL
ABO + RH BLD: NORMAL
ALBUMIN SERPL-MCNC: 3.9 G/DL (ref 3.4–5)
ALP SERPL-CCNC: 84 U/L (ref 40–150)
ALT SERPL W P-5'-P-CCNC: 40 U/L (ref 0–70)
ANION GAP SERPL CALCULATED.3IONS-SCNC: 6 MMOL/L (ref 3–14)
AST SERPL W P-5'-P-CCNC: 14 U/L (ref 0–45)
BASOPHILS # BLD AUTO: 0 10E9/L (ref 0–0.2)
BASOPHILS NFR BLD AUTO: 0.6 %
BILIRUB SERPL-MCNC: 0.5 MG/DL (ref 0.2–1.3)
BLD GP AB SCN SERPL QL: NORMAL
BLOOD BANK CMNT PATIENT-IMP: NORMAL
BUN SERPL-MCNC: 29 MG/DL (ref 7–30)
CALCIUM SERPL-MCNC: 9.1 MG/DL (ref 8.5–10.1)
CHLORIDE SERPL-SCNC: 111 MMOL/L (ref 94–109)
CO2 SERPL-SCNC: 26 MMOL/L (ref 20–32)
CREAT SERPL-MCNC: 0.89 MG/DL (ref 0.66–1.25)
DIFFERENTIAL METHOD BLD: ABNORMAL
EOSINOPHIL # BLD AUTO: 0 10E9/L (ref 0–0.7)
EOSINOPHIL NFR BLD AUTO: 1.1 %
ERYTHROCYTE [DISTWIDTH] IN BLOOD BY AUTOMATED COUNT: 26.5 % (ref 10–15)
GFR SERPL CREATININE-BSD FRML MDRD: >90 ML/MIN/{1.73_M2}
GLUCOSE SERPL-MCNC: 118 MG/DL (ref 70–99)
HCT VFR BLD AUTO: 25.6 % (ref 40–53)
HGB BLD-MCNC: 8.5 G/DL (ref 13.3–17.7)
IMM GRANULOCYTES # BLD: 0 10E9/L (ref 0–0.4)
IMM GRANULOCYTES NFR BLD: 1.1 %
LYMPHOCYTES # BLD AUTO: 0.5 10E9/L (ref 0.8–5.3)
LYMPHOCYTES NFR BLD AUTO: 28.5 %
MCH RBC QN AUTO: 36.3 PG (ref 26.5–33)
MCHC RBC AUTO-ENTMCNC: 33.2 G/DL (ref 31.5–36.5)
MCV RBC AUTO: 109 FL (ref 78–100)
MONOCYTES # BLD AUTO: 0.4 10E9/L (ref 0–1.3)
MONOCYTES NFR BLD AUTO: 20.1 %
NEUTROPHILS # BLD AUTO: 0.9 10E9/L (ref 1.6–8.3)
NEUTROPHILS NFR BLD AUTO: 48.6 %
NRBC # BLD AUTO: 0 10*3/UL
NRBC BLD AUTO-RTO: 1 /100
PLATELET # BLD AUTO: 20 10E9/L (ref 150–450)
PLATELET # BLD EST: ABNORMAL 10*3/UL
POTASSIUM SERPL-SCNC: 4.1 MMOL/L (ref 3.4–5.3)
PROT SERPL-MCNC: 7 G/DL (ref 6.8–8.8)
RBC # BLD AUTO: 2.34 10E12/L (ref 4.4–5.9)
SODIUM SERPL-SCNC: 143 MMOL/L (ref 133–144)
SPECIMEN EXP DATE BLD: NORMAL
WBC # BLD AUTO: 1.8 10E9/L (ref 4–11)

## 2020-01-21 PROCEDURE — 86900 BLOOD TYPING SEROLOGIC ABO: CPT | Performed by: PHYSICIAN ASSISTANT

## 2020-01-21 PROCEDURE — 96372 THER/PROPH/DIAG INJ SC/IM: CPT

## 2020-01-21 PROCEDURE — 25000128 H RX IP 250 OP 636: Mod: ZF | Performed by: STUDENT IN AN ORGANIZED HEALTH CARE EDUCATION/TRAINING PROGRAM

## 2020-01-21 PROCEDURE — 85025 COMPLETE CBC W/AUTO DIFF WBC: CPT | Performed by: STUDENT IN AN ORGANIZED HEALTH CARE EDUCATION/TRAINING PROGRAM

## 2020-01-21 PROCEDURE — 36415 COLL VENOUS BLD VENIPUNCTURE: CPT

## 2020-01-21 PROCEDURE — 80053 COMPREHEN METABOLIC PANEL: CPT | Performed by: STUDENT IN AN ORGANIZED HEALTH CARE EDUCATION/TRAINING PROGRAM

## 2020-01-21 PROCEDURE — 86901 BLOOD TYPING SEROLOGIC RH(D): CPT | Performed by: PHYSICIAN ASSISTANT

## 2020-01-21 PROCEDURE — G0463 HOSPITAL OUTPT CLINIC VISIT: HCPCS | Mod: 25

## 2020-01-21 PROCEDURE — 86850 RBC ANTIBODY SCREEN: CPT | Performed by: PHYSICIAN ASSISTANT

## 2020-01-21 RX ORDER — ALBUTEROL SULFATE 5 MG/ML
2.5 SOLUTION RESPIRATORY (INHALATION)
Status: CANCELLED
Start: 2020-02-18

## 2020-01-21 RX ORDER — PENTAMIDINE ISETHIONATE 300 MG/300MG
300 INHALANT RESPIRATORY (INHALATION)
Status: CANCELLED
Start: 2020-02-18

## 2020-01-21 RX ORDER — HEPARIN SODIUM,PORCINE 10 UNIT/ML
5 VIAL (ML) INTRAVENOUS
Status: CANCELLED | OUTPATIENT
Start: 2020-02-18

## 2020-01-21 RX ADMIN — FILGRASTIM 480 MCG: 480 INJECTION, SOLUTION INTRAVENOUS; SUBCUTANEOUS at 08:46

## 2020-01-21 ASSESSMENT — PAIN SCALES - GENERAL: PAINLEVEL: NO PAIN (0)

## 2020-01-21 NOTE — NURSING NOTE
"Oncology Rooming Note    January 21, 2020 8:23 AM   Angel Yanez is a 59 year old male who presents for:    Chief Complaint   Patient presents with     Blood Draw     labs drawn via venipuncture by RN in lab     RECHECK     Pt is here for S/P BMT for MM     Initial Vitals: Blood Pressure (Abnormal) 140/81 (BP Location: Left arm, Patient Position: Chair, Cuff Size: Adult Regular)   Pulse 104   Temperature 97.8  F (36.6  C) (Oral)   Respiration 16   Weight 76.6 kg (168 lb 12.8 oz)   Oxygen Saturation 100%   Body Mass Index 24.22 kg/m   Estimated body mass index is 24.22 kg/m  as calculated from the following:    Height as of 1/14/20: 1.778 m (5' 10\").    Weight as of this encounter: 76.6 kg (168 lb 12.8 oz). Body surface area is 1.95 meters squared.  No Pain (0) Comment: Data Unavailable   No LMP for male patient.  Allergies reviewed: Yes  Medications reviewed: Yes    Medications: Medication refills not needed today.  Pharmacy name entered into EPIC:    [facility] - [place], [place] - [address]  [facility] - [place], [place] - [address] [facility]    Clinical concerns: none       Shoshana Salazar MA            "

## 2020-01-21 NOTE — NURSING NOTE
Chief Complaint   Patient presents with     Blood Draw     labs drawn via venipuncture by RN in lab     BP (!) 140/81 (BP Location: Left arm, Patient Position: Chair, Cuff Size: Adult Regular)   Pulse 104   Temp 97.8  F (36.6  C) (Oral)   Resp 16   Wt 76.6 kg (168 lb 12.8 oz)   SpO2 100%   BMI 24.22 kg/m      Labs collected and sent from left antecubital venipuncture in lab by RN. Pt tolerated well.   Pt checked in for next appointment.    Maria Teresa Bourgeois RN

## 2020-01-21 NOTE — PROGRESS NOTES
BMT Daily Progress Note        Patient ID:  Angel Yanez is a 60 yo man D+249 s/p 2nd autologous transplant for MM.   S/p 2 readmissions for FUO (10/16-10/23; 9/23-9/29/19). Readmitted on 12/9/2019 with return of fevers despite celebrex, FARZANA, worsening sob and return of rash         Diagnosis MM Multiple myeloma  HCT Type Autologous    Prep Regimen Cytoxan  Melphalan   Donor Source No data was found    GVHD Prophylaxis No  Primary BMT Provider [facility]         INTERVAL  HISTORY   Returns for follow up. Feeling well. No fevers on pred taper. Energy level is slowly improving. No recurrence of rashes. Occasional blood tinged nasal secretions but no bloody noses. No cough, congestion, sore throat, n/v/d. Eating well.     Review of Systems: 6 point ROS negative except as noted above.        PHYSICAL EXAM       Blood pressure (!) 140/81, pulse 104, temperature 97.8  F (36.6  C), temperature source Oral, resp. rate 16, weight 76.6 kg (168 lb 12.8 oz), SpO2 100 %.    Wt Readings from Last 4 Encounters:   01/21/20 76.6 kg (168 lb 12.8 oz)   01/17/20 75.8 kg (167 lb)   01/14/20 75.9 kg (167 lb 4.8 oz)   01/10/20 74.4 kg (164 lb)     General: NAD   Eyes: FARRAH, sclera anicteric   Nose/Mouth/Throat: OP clear, buccal mucosa moist, no ulcerations   Lungs:  CTAB without rales, rhonchi, or wheezes  Cardiovascular: RRR, no M/R/G   Abdominal/Rectal: +BS, soft, NT, ND  Lymphatics: no edema  Skin: no rashes or petechiae         LABS AND IMAGING - PAST 24 HOURS     Lab Results   Component Value Date    WBC 1.8 (L) 01/17/2020    ANEU 1.0 (L) 01/17/2020    HGB 8.7 (L) 01/17/2020    HCT 25.9 (L) 01/17/2020    PLT 18 (LL) 01/17/2020     01/17/2020    POTASSIUM 4.0 01/17/2020    CHLORIDE 108 01/17/2020    CO2 28 01/17/2020    GLC 98 01/17/2020    BUN 18 01/17/2020    CR 0.90 01/17/2020    MAG 2.2 01/03/2020    INR 1.53 (H) 12/09/2019    BILITOTAL 0.6 01/14/2020    AST 24 01/14/2020    ALT 51 01/14/2020    ALKPHOS 75 01/14/2020     PROTTOTAL 7.1 01/14/2020    ALBUMIN 4.0 01/14/2020       I have assessed all abnormal lab values for their clinical significance and any values considered clinically significant have been addressed in the assessment and plan.       OVERALL PLAN   Angel Yanez is a 58 yo man D+249 s/p 2nd autologous transplant for MM.   S/p 2 readmissions for FUO (10/16-10/23; 9/23-9/29/19).      1.  BMT/MM:   Slow engraftment; cell dose was only 0.639x10^6. ([facility] [place] - known poor cell dose as failed chemo-mobilization 1/2019.)   - day +180 bone marrow biopsy 11/6/19 which showed no morphologic or immunophenotypic evidence of plasma cell neoplasm. His light chains were not elevated and he had no monoclonal protein in the serum.  He is in clinical remission.  - PET in 8/2019 clear.   - PB FLOW 11/23: No overt aberrant immunophenotype on T cells.  Rare to absent mature B cells and plasma cells.      2.  HEME: Keep Hgb>8g/dL, plt >10l .  - Retic up to 2.9%, Counts overall stable, last plt transfusion was 1/3 and last RBCs was 1/10   -  Last GCSF on 12/13/2019, then 1/17/19,  Give today for ANC 1.0.   - Persistent thrombocytopenia: BM 11/6/19 showed nearly 0 platelet precursors. Trial of promacta not helpful. Stopped 12/6/19. plts up to 20k un-transfused!     3.  ID:   FUOs: Extensive ID work up- no etiology identified. Now afebrile since starting Pred.   - 12/13 EBUS bx with ngtd.- No actual tissue obtained.  -12/9 RVP=neg  -12/9 CT chest is stable with GGO better. No antibiotics given during admission because do not feel like fevers are infection.  - Per Dr. Lucio, start pred taper over 5-6 weeks (see schedule in Epic). Started this 1/15. Currently taking Prednisone 25mg PO daily.     Prophy: ACV, azithro (pred), fluconazole, pentamidine (1/14)  t cell subset, Absolute QX9=813.   - influenza vaccination given 11/26/19        4. Pulm:  SOB resolved.   - 10/17: Echo with preserved EF, no evidence of right heart strain. Repeat  echo 11/14 stable.  - 10/17 VQ scan neg for PE     5. GI:  no Complaints.      6.  FEN/Renal:   Cr and lytes WNL     7.  Mood: Continue Paxil.     8. 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 Resolved. Recurrence 12/9- resolved with Triamcinolone TID.    9. CV: Steroid induced hypertension  - start 5mg lisinopril PO daily. BP slightly elevated but has not taken lisinopril yet this AM.      10. Dispo:  FUOs- Some kind of auto immune process DDX:  Sarcoidosis? Underwent EBUS FNA which was negative for granulomas however unlikely you could see this on just fluid aspirate so would not consider   - on prednisone taper (as above)      RTC: 1 week for labs, provider visit, possible gcsf, and infusion for possible plts/rbcs.   Sooner if fevers recur or develops other symptoms.     Fei Meng PA-C  [phone]

## 2020-01-25 RX ORDER — HEPARIN SODIUM,PORCINE 10 UNIT/ML
5 VIAL (ML) INTRAVENOUS
Status: CANCELLED | OUTPATIENT
Start: 2020-02-18

## 2020-01-28 ENCOUNTER — ONCOLOGY VISIT (OUTPATIENT)
Dept: TRANSPLANT | Facility: CLINIC | Age: 60
End: 2020-01-28
Attending: STUDENT IN AN ORGANIZED HEALTH CARE EDUCATION/TRAINING PROGRAM
Payer: COMMERCIAL

## 2020-01-28 ENCOUNTER — APPOINTMENT (OUTPATIENT)
Dept: LAB | Facility: CLINIC | Age: 60
End: 2020-01-28
Attending: STUDENT IN AN ORGANIZED HEALTH CARE EDUCATION/TRAINING PROGRAM
Payer: COMMERCIAL

## 2020-01-28 VITALS
SYSTOLIC BLOOD PRESSURE: 123 MMHG | BODY MASS INDEX: 24.46 KG/M2 | TEMPERATURE: 98 F | HEART RATE: 99 BPM | RESPIRATION RATE: 14 BRPM | DIASTOLIC BLOOD PRESSURE: 81 MMHG | WEIGHT: 170.5 LBS | OXYGEN SATURATION: 99 %

## 2020-01-28 DIAGNOSIS — C90.01 MULTIPLE MYELOMA IN REMISSION (H): Primary | ICD-10-CM

## 2020-01-28 PROCEDURE — G0463 HOSPITAL OUTPT CLINIC VISIT: HCPCS | Mod: ZF

## 2020-01-28 PROCEDURE — 36415 COLL VENOUS BLD VENIPUNCTURE: CPT

## 2020-01-28 ASSESSMENT — PAIN SCALES - GENERAL: PAINLEVEL: NO PAIN (0)

## 2020-01-28 NOTE — PROGRESS NOTES
BMT Daily Progress Note        Patient ID:  Angel Yanez is a 58 yo man D+256 s/p 2nd autologous transplant for MM.   S/p 2 readmissions for FUO (10/16-10/23; 9/23-9/29/19). Readmitted on 12/9/2019 with return of fevers despite celebrex, FARZANA, worsening sob and return of rash         Diagnosis MM Multiple myeloma  HCT Type Autologous    Prep Regimen Cytoxan  Melphalan   Donor Source No data was found    GVHD Prophylaxis No  Primary BMT Provider [facility]         INTERVAL  HISTORY   Returns for follow up. No fevers on pred taper, goes down to 15mg this week. Good energy, went to work for 14 hours last week. No recurrence of rashes. No recent bloody nose or secretions. No n/v/d, rashes, bruises or other bleeding.     Review of Systems: 8 point ROS negative except as noted above.        PHYSICAL EXAM       Blood pressure 123/81, pulse 99, temperature 98  F (36.7  C), temperature source Oral, resp. rate 14, weight 77.3 kg (170 lb 8 oz), SpO2 99 %.    Wt Readings from Last 4 Encounters:   01/28/20 77.3 kg (170 lb 8 oz)   01/21/20 76.6 kg (168 lb 12.8 oz)   01/17/20 75.8 kg (167 lb)   01/14/20 75.9 kg (167 lb 4.8 oz)     General: NAD   Eyes: FARRAH, sclera anicteric   Nose/Mouth/Throat: OP clear, buccal mucosa moist, no ulcerations   Lungs:  CTAB without rales, rhonchi, or wheezes  Cardiovascular: RRR, no M/R/G   Abdominal/Rectal: +BS, soft, NT, ND  Lymphatics: no edema  Skin: no rashes or petechiae   Access: peripheral      LABS AND IMAGING - PAST 24 HOURS     Lab Results   Component Value Date    WBC 3.4 (L) 01/28/2020    ANEU 2.5 01/28/2020    HGB 8.1 (L) 01/28/2020    HCT 24.2 (L) 01/28/2020    PLT 19 (LL) 01/28/2020     01/28/2020    POTASSIUM 4.5 01/28/2020    CHLORIDE 107 01/28/2020    CO2 26 01/28/2020     (H) 01/28/2020    BUN 19 01/28/2020    CR 0.93 01/28/2020    MAG 2.2 01/03/2020    INR 1.53 (H) 12/09/2019    BILITOTAL 0.5 01/28/2020    AST 17 01/28/2020    ALT 33 01/28/2020    ALKPHOS 68  01/28/2020    PROTTOTAL 7.0 01/28/2020    ALBUMIN 4.0 01/28/2020       I have assessed all abnormal lab values for their clinical significance and any values considered clinically significant have been addressed in the assessment and plan.       OVERALL PLAN   Angel Yanez is a 58 yo man D+256 s/p 2nd autologous transplant for MM.   S/p 2 readmissions for FUO (10/16-10/23; 9/23-9/29/19).      1.  BMT/MM:   Slow engraftment; cell dose was only 0.639x10^6. ([facility] [place] - known poor cell dose as failed chemo-mobilization 1/2019.)   - day +180 bone marrow biopsy 11/6/19 which showed no morphologic or immunophenotypic evidence of plasma cell neoplasm. His light chains were not elevated and he had no monoclonal protein in the serum.  He is in clinical remission.  - PET in 8/2019 clear.   - PB FLOW 11/23: No overt aberrant immunophenotype on T cells.  Rare to absent mature B cells and plasma cells.      2.  HEME: Keep Hgb>8g/dL, plt >10l .  - Retic up to 2.9%, Counts overall stable, last plt transfusion was 1/3 and last RBCs was 1/10  -  Last GCSF on 12/13/2019, then 1/17/19, ANC up x1 week since 1/21 gcsf. None needed today  - Persistent thrombocytopenia: BM 11/6/19 showed nearly 0 platelet precursors. Trial of promacta not helpful. Stopped 12/6/19. Plts stable today at 19k.     3.  ID:   FUOs: Extensive ID work up- no etiology identified. Now afebrile since starting Pred.   - 12/13 EBUS bx with ngtd.- No actual tissue obtained.  -12/9 RVP=neg  -12/9 CT chest is stable with GGO better. No antibiotics given during admission because do not feel like fevers are infection.  - Per Dr. Lucio, start pred taper over 5-6 weeks (see schedule in Epic). Started this 1/15. Prednisone tomorrow goes to 15mg PO daily.     Prophy: ACV, azithro (pred), fluconazole, pentamidine (1/14)  t cell subset, Absolute SV5=162.   - influenza vaccination given 11/26/19       4. Pulm:  SOB resolved.   - 10/17: Echo with preserved EF, no  evidence of right heart strain. Repeat echo 11/14 stable.  - 10/17 VQ scan neg for PE     5. GI:  no Complaints.      6.  FEN/Renal:   Cr and lytes WNL     7.  Mood: Continue Paxil.     8. 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 Resolved. Recurrence 12/9- resolved with Triamcinolone TID.    9. CV: Steroid induced hypertension  - start 5mg lisinopril PO daily. BP stable no changes     10. Dispo:  FUOs- Some kind of auto immune process DDX:  Sarcoidosis? Underwent EBUS FNA which was negative for granulomas however unlikely you could see this on just fluid aspirate so would not consider   - on prednisone taper (as above)      RTC: 1 week for labs, provider visit, possible gcsf, and infusion for possible plts/rbcs.   Sooner if fevers recur or develops other symptoms.   Mony Pitts PAC  [phone]

## 2020-01-28 NOTE — NURSING NOTE
"Oncology Rooming Note    January 28, 2020 10:49 AM   Angel Yanez is a 59 year old male who presents for:    Chief Complaint   Patient presents with     Blood Draw     Labs drawn via  by RN in lab. VS taken.      RECHECK     Return: Multiple Myeloma      Initial Vitals: /81   Pulse 99   Temp 98  F (36.7  C) (Oral)   Resp 14   Wt 77.3 kg (170 lb 8 oz)   SpO2 99%   BMI 24.46 kg/m   Estimated body mass index is 24.46 kg/m  as calculated from the following:    Height as of 1/14/20: 1.778 m (5' 10\").    Weight as of this encounter: 77.3 kg (170 lb 8 oz). Body surface area is 1.95 meters squared.  No Pain (0) Comment: Data Unavailable   No LMP for male patient.  Allergies reviewed: Yes  Medications reviewed: Yes    Medications: Medication refills not needed today.  Pharmacy name entered into EPIC:    [email or web address] - [place] - [address] [facility] - [place], [place] - [address]    Clinical concerns: None       Kathy Perez CMA              "

## 2020-02-04 ENCOUNTER — ONCOLOGY VISIT (OUTPATIENT)
Dept: TRANSPLANT | Facility: CLINIC | Age: 60
End: 2020-02-04
Attending: INTERNAL MEDICINE
Payer: COMMERCIAL

## 2020-02-04 ENCOUNTER — APPOINTMENT (OUTPATIENT)
Dept: LAB | Facility: CLINIC | Age: 60
End: 2020-02-04
Attending: INTERNAL MEDICINE
Payer: COMMERCIAL

## 2020-02-04 VITALS
BODY MASS INDEX: 24.5 KG/M2 | WEIGHT: 171.1 LBS | HEIGHT: 70 IN | HEART RATE: 91 BPM | SYSTOLIC BLOOD PRESSURE: 144 MMHG | DIASTOLIC BLOOD PRESSURE: 89 MMHG | TEMPERATURE: 98 F | OXYGEN SATURATION: 100 %

## 2020-02-04 DIAGNOSIS — Z94.81 STATUS POST BONE MARROW TRANSPLANT (H): ICD-10-CM

## 2020-02-04 DIAGNOSIS — A68.9 RECURRENT FEVER: ICD-10-CM

## 2020-02-04 DIAGNOSIS — N17.9 AKI (ACUTE KIDNEY INJURY) (H): ICD-10-CM

## 2020-02-04 DIAGNOSIS — R59.1 LYMPHADENOPATHY: ICD-10-CM

## 2020-02-04 DIAGNOSIS — Z86.2 PERSONAL HISTORY OF DISEASES OF BLOOD AND BLOOD-FORMING ORGANS: ICD-10-CM

## 2020-02-04 DIAGNOSIS — C90.01 MULTIPLE MYELOMA IN REMISSION (H): ICD-10-CM

## 2020-02-04 DIAGNOSIS — C90.00 MULTIPLE MYELOMA NOT HAVING ACHIEVED REMISSION (H): ICD-10-CM

## 2020-02-04 PROCEDURE — G0463 HOSPITAL OUTPT CLINIC VISIT: HCPCS | Mod: ZF

## 2020-02-04 PROCEDURE — 36415 COLL VENOUS BLD VENIPUNCTURE: CPT

## 2020-02-04 RX ORDER — ACYCLOVIR 800 MG/1
800 TABLET ORAL 2 TIMES DAILY
Qty: 60 TABLET | Refills: 1 | Status: SHIPPED | OUTPATIENT
Start: 2020-02-04 | End: 2020-03-03

## 2020-02-04 RX ORDER — PAROXETINE 20 MG/1
20 TABLET, FILM COATED ORAL EVERY MORNING
Qty: 30 TABLET | Refills: 3 | Status: SHIPPED | OUTPATIENT
Start: 2020-02-04 | End: 2020-06-01

## 2020-02-04 RX ORDER — FLUCONAZOLE 100 MG/1
100 TABLET ORAL DAILY
Qty: 20 TABLET | Refills: 0 | Status: SHIPPED | OUTPATIENT
Start: 2020-02-04 | End: 2020-02-21

## 2020-02-04 ASSESSMENT — MIFFLIN-ST. JEOR: SCORE: 1587.35

## 2020-02-04 NOTE — PROGRESS NOTES
BMT Daily Progress Note        Patient ID:  Angel Ynaez is a 58 yo man D+263 s/p 2nd autologous transplant for MM.   S/p 2 readmissions for FUO (10/16-10/23; 9/23-9/29/19). Readmitted on 12/9/2019 with return of fevers despite celebrex, FARZANA, worsening sob and return of rash         Diagnosis MM Multiple myeloma  HCT Type Autologous    Prep Regimen Cytoxan  Melphalan   Donor Source No data was found    GVHD Prophylaxis No  Primary BMT Provider [facility]         INTERVAL  HISTORY   Returns for follow up. No fevers on pred taper, goes down to 10mg tomorrow.  He'd like to have more energy, but he is able to work part time and he isn't needing naps during the day.  Exercising by walking.  No chest pain or abdominal pain.  No cough/cold or fevers, although he notes his wife has had fevers, cough and feels crappy.  She has been sleeping in another room and they are avoiding contact.  He doesn't have any of those symptoms.  He is eating and eliminating without issues.  No dysuria.  No rash, just some itchy skin.      Review of Systems: 8 point ROS negative except as noted above.        PHYSICAL EXAM       Blood pressure (!) 144/89, pulse 91, temperature 98  F (36.7  C), temperature source Oral, weight 77.6 kg (171 lb 1.6 oz), SpO2 100 %.    Wt Readings from Last 4 Encounters:   02/04/20 77.6 kg (171 lb 1.6 oz)   01/28/20 77.3 kg (170 lb 8 oz)   01/21/20 76.6 kg (168 lb 12.8 oz)   01/17/20 75.8 kg (167 lb)     General: NAD   Eyes: FARRAH, sclera anicteric   Nose/Mouth/Throat: OP clear, buccal mucosa moist, no ulcerations   Lungs:  CTAB without rales, rhonchi, or wheezes  Cardiovascular: RRR, no M/R/G   Abdominal/Rectal: +BS, soft, NT, ND  Lymphatics: no edema  Skin: no rashes or petechiae        LABS AND IMAGING - PAST 24 HOURS     Lab Results   Component Value Date    WBC 2.9 (L) 02/04/2020    ANEU 2.0 02/04/2020    HGB 8.1 (L) 02/04/2020    HCT 24.4 (L) 02/04/2020    PLT 20 (LL) 02/04/2020     02/04/2020     POTASSIUM 4.6 02/04/2020    CHLORIDE 108 02/04/2020    CO2 26 02/04/2020    GLC 94 02/04/2020    BUN 23 02/04/2020    CR 1.01 02/04/2020    MAG 2.2 01/03/2020    INR 1.53 (H) 12/09/2019    BILITOTAL 0.5 02/04/2020    AST 15 02/04/2020    ALT 30 02/04/2020    ALKPHOS 70 02/04/2020    PROTTOTAL 7.0 02/04/2020    ALBUMIN 4.0 02/04/2020       I have assessed all abnormal lab values for their clinical significance and any values considered clinically significant have been addressed in the assessment and plan.       OVERALL PLAN   Angel Yanez is a 60 yo man D+263 s/p 2nd autologous transplant for MM.   S/p 2 readmissions for FUO (10/16-10/23; 9/23-9/29/19).      1.  BMT/MM:   Slow engraftment; cell dose was only 0.639x10^6. ([facility] [place] - known poor cell dose as failed chemo-mobilization 1/2019.)   - day +180 bone marrow biopsy 11/6/19 which showed no morphologic or immunophenotypic evidence of plasma cell neoplasm. His light chains were not elevated and he had no monoclonal protein in the serum.  He is in clinical remission.  - PET in 8/2019 clear.   - PB FLOW 11/23: No overt aberrant immunophenotype on T cells.  Rare to absent mature B cells and plasma cells.      2.  HEME: Keep Hgb>8g/dL, plt >10k.  -  Last GCSF 1/21/2020. None needed today  - Persistent thrombocytopenia: BM 11/6/19 showed nearly 0 platelet precursors. Trial of promacta not helpful. Stopped 12/6/19.  Plts 20K today.   - hgb 8.1, stable from last week.  Will not transfuse.      3.  ID:   FUOs: Extensive ID work up- no etiology identified. Now afebrile since starting Pred.   - 12/13 EBUS bx with ngtd.- No actual tissue obtained.  -12/9 RVP=neg  -12/9 CT chest is stable with GGO better. No antibiotics given during admission because do not feel like fevers are infection.  - Per Dr. Lucio, start pred taper over 5-6 weeks (see schedule in Epic). Started this 1/15. Prednisone tomorrow goes to 10mg PO daily.     Prophy: nithya ELENA (pred),  fluconazole, pentamidine (1/14)  t cell subset, Absolute NI8=673.   - influenza vaccination given 11/26/19        4. GI:  no Complaints.     5. CV: Steroid induced hypertension  - start 5mg lisinopril PO daily. BP stable no changes     6.  FEN/Renal:   Cr and lytes WNL     7.  Mood: Continue Paxil.     8. Derm:   - Hx of rash and skin bx 11/15 consistent with chemotheapy-induced [place]'s disease. Resolved. Recurrence 12/9- resolved with Triamcinolone TID.     9. FUOs- Some kind of auto immune process DDX:  Sarcoidosis? Underwent EBUS FNA which was negative for granulomas however unlikely you could see this on just fluid aspirate so would not consider   - on prednisone taper (as above)    10. Psych:  Mood stable on paroxitine; refilled 2/4.     RTC: 1 week for labs, provider visit, possible gcsf, and infusion for possible plts/rbcs.   Sooner if fevers recur or develops other symptoms.  Advised he or his wife mask up at home to avoid transmitting him the flu, if that is what she has.    Latrice Pride PA-C  2/4/2020

## 2020-02-04 NOTE — NURSING NOTE
"Oncology Rooming Note    February 4, 2020 10:54 AM   Angel Yanez is a 59 year old male who presents for:    Chief Complaint   Patient presents with     Blood Draw      blood draw and vitals     RECHECK     Return: Multiple myeloma      Initial Vitals: BP (!) 144/89 (BP Location: Left arm, Patient Position: Sitting, Cuff Size: Adult Large)   Pulse 91   Temp 98  F (36.7  C) (Oral)   Ht 1.778 m (5' 10\")   Wt 77.6 kg (171 lb 1.6 oz)   SpO2 100%   BMI 24.55 kg/m   Estimated body mass index is 24.55 kg/m  as calculated from the following:    Height as of this encounter: 1.778 m (5' 10\").    Weight as of this encounter: 77.6 kg (171 lb 1.6 oz). Body surface area is 1.96 meters squared.  Data Unavailable Comment: Data Unavailable   No LMP for male patient.  Allergies reviewed: Yes  Medications reviewed: Yes    Medications: MEDICATION REFILLS NEEDED TODAY. Provider was notified.  Pharmacy name entered into EPIC:    [facility] [place] - [address]  [facility] - [place], [place] - [address] [facility]    Clinical concerns: Refill Fluconazole, Acyclovir, and Paroxetine.  Latrice WHEELER was notified.      Yesica Duncan CMA              "

## 2020-02-04 NOTE — NURSING NOTE
Vitals done, labs drawn by venipuncture.  See doc flow sheets for details.  Yolie Ray, JORGE LUIS February 4, 2020

## 2020-02-07 ENCOUNTER — TELEPHONE (OUTPATIENT)
Dept: TRANSPLANT | Facility: CLINIC | Age: 60
End: 2020-02-07

## 2020-02-07 NOTE — TELEPHONE ENCOUNTER
Pt is experiencing symptoms. Call back number is [phone]      [facility]  [phone]      Patient called to report wife was diagnosed with bronchitis. Had a cough and runny nose this week. Temp today was 100.2. Last labs note he is not neutropenic. Also history of FUO. He denies feeling chilled, cough non productive, no hemoptysis. No chest pain or SOB. Requested pt present to clinic for labs, RVP and provider visit pt, however patent politely declined and states he isn't feeling poorly. Discussed option to come in to clinic now or consider ED if further febrile with cold symptoms. Pt acknowledged options and prefers to not come in to clinic today.   Mony Pitts PAC

## 2020-02-10 NOTE — PROGRESS NOTES
BMT Daily Progress Note        Patient ID:  Angel Yanez is a 60 yo man D+270 s/p 2nd autologous transplant for MM.   S/p 2 readmissions for FUO (10/16-10/23; 9/23-9/29/19). Readmitted on 12/9/2019 with return of fevers despite celebrex, FARZANA, worsening sob and return of rash         Diagnosis MM Multiple myeloma  HCT Type Autologous    Prep Regimen Cytoxan  Melphalan   Donor Source No data was found    GVHD Prophylaxis No  Primary BMT Provider [facility]         INTERVAL  HISTORY   Returns for follow up. Continues on pred taper, down to 5mg/d tomorrow. His wife is recovering from bronchitis. He has had a mild, non-productive cough since last Thursday or so. No new dyspnea. One temp to 100.2 last week. No chills, sore throat, sinus pain, or purulent nasal drainage. Slightly improving. Declines nasal swabs. No bleeding or rash. Working part time.     Review of Systems: 8 point ROS negative except as noted above.        PHYSICAL EXAM   Blood pressure 118/73, pulse 83, temperature 98.3  F (36.8  C), temperature source Oral, resp. rate 16, weight 76.8 kg (169 lb 4.8 oz), SpO2 100 %.    Wt Readings from Last 4 Encounters:   02/11/20 76.8 kg (169 lb 4.8 oz)   02/04/20 77.6 kg (171 lb 1.6 oz)   01/28/20 77.3 kg (170 lb 8 oz)   01/21/20 76.6 kg (168 lb 12.8 oz)     General: NAD   Eyes: sclera anicteric   Nose/Mouth/Throat: OP clear, buccal mucosa moist, no ulcerations   Lungs:  CTAB without rales, rhonchi, or wheezes  Cardiovascular: RRR, no M/R/G   Abdominal/Rectal: +BS, soft, NT, ND  Lymphatics: no edema  Skin: no rashes or petechiae   No central access     Labs:  Lab Results   Component Value Date    WBC 2.3 (L) 02/11/2020    ANEU 1.5 (L) 02/11/2020    HGB 7.9 (L) 02/11/2020    HCT 23.5 (L) 02/11/2020    PLT 15 (LL) 02/11/2020     02/11/2020    POTASSIUM 4.2 02/11/2020    CHLORIDE 105 02/11/2020    CO2 26 02/11/2020     (H) 02/11/2020    BUN 16 02/11/2020    CR 0.91 02/11/2020    MAG 2.2 01/03/2020    INR  1.53 (H) 12/09/2019    BILITOTAL 0.4 02/11/2020    AST 26 02/11/2020    ALT 31 02/11/2020    ALKPHOS 70 02/11/2020    PROTTOTAL 7.1 02/11/2020    ALBUMIN 3.8 02/11/2020        ASSESSMENTPLAN   nAgel Yanez is a 58 yo man D+270 s/p 2nd autologous transplant for MM.   S/p 2 readmissions for FUO (10/16-10/23; 9/23-9/29/19).      1.  BMT/MM:   Slow engraftment; cell dose was only 0.639x10^6. ([facility] [place] - known poor cell dose as failed chemo-mobilization 1/2019.)   - day +180 bone marrow biopsy 11/6/19 which showed no morphologic or immunophenotypic evidence of plasma cell neoplasm. His light chains were not elevated and he had no monoclonal protein in the serum.  He is in clinical remission.  - PET in 8/2019 clear.   - PB FLOW 11/23: No overt aberrant immunophenotype on T cells.  Rare to absent mature B cells and plasma cells.      2.  HEME: Keep Hgb>8g/dL, plt >10k.  -  Last GCSF 1/21/2020. None needed today  - Persistent thrombocytopenia: BM 11/6/19 showed nearly 0 platelet precursors. Trial of promacta not helpful. Stopped 12/6/19.  Plts 15K today.   - hgb down slightly at 7.9 (recently low-8s). Will not transfuse today. Preorder for next week.      3.  ID: Mild URI sx. suportive cares discussed. Call/RTC with worsening sx. Declines RVP.  FUOs: Extensive ID work up- no etiology identified. Now afebrile since starting Pred.   - 12/13 EBUS bx with ngtd.- No actual tissue obtained.  - 12/9 RVP=neg  - 12/9 CT chest is stable with GGO better. No antibiotics given during admission because do not feel like fevers are infection.  - Per Dr. Lucio, start pred taper over 5-6 weeks (see schedule in Epic). Started this 1/15. Prednisone tomorrow goes to 5mg PO daily.     Prophy: ACV, azithro (pred), fluconazole, pentamidine (1/14)  t cell subset, Absolute SD3=270.   - influenza vaccination given 11/26/19    4. GI:  no complaints.     5. CV: Steroid induced hypertension  - cont lisinopril     6.  FEN/Renal:   Cr and  lytes WNL     7.  Mood: Continue Paxil.     8. Derm:   - Hx of rash and skin bx 11/15 consistent with chemotheapy-induced [place]'s disease. Resolved. Recurrence 12/9- resolved with Triamcinolone TID.     9. FUOs- Some kind of auto immune process DDX:  Sarcoidosis? Underwent EBUS FNA which was negative for granulomas however unlikely you could see this on just fluid aspirate so would not consider   - on prednisone taper (as above)    10. Psych:  Mood stable on paroxitine; refilled 2/4.     RTC: 1 week for labs, provider visit, possible gcsf, and infusion for possible plts/rbcs. Pentamidine.  Sooner if fevers recur or develops other symptoms.     Lana Reynolds PA-C

## 2020-02-11 ENCOUNTER — INFUSION THERAPY VISIT (OUTPATIENT)
Dept: TRANSPLANT | Facility: CLINIC | Age: 60
End: 2020-02-11
Attending: PHYSICIAN ASSISTANT
Payer: COMMERCIAL

## 2020-02-11 ENCOUNTER — APPOINTMENT (OUTPATIENT)
Dept: LAB | Facility: CLINIC | Age: 60
End: 2020-02-11
Attending: PHYSICIAN ASSISTANT
Payer: COMMERCIAL

## 2020-02-11 VITALS
OXYGEN SATURATION: 100 % | SYSTOLIC BLOOD PRESSURE: 118 MMHG | RESPIRATION RATE: 16 BRPM | DIASTOLIC BLOOD PRESSURE: 73 MMHG | BODY MASS INDEX: 24.29 KG/M2 | WEIGHT: 169.3 LBS | HEART RATE: 83 BPM | TEMPERATURE: 98.3 F

## 2020-02-11 DIAGNOSIS — C90.01 MULTIPLE MYELOMA IN REMISSION (H): Primary | ICD-10-CM

## 2020-02-11 DIAGNOSIS — L40.50 PSORIASIS WITH ARTHROPATHY (H): ICD-10-CM

## 2020-02-11 LAB
ABO + RH BLD: NORMAL
ABO + RH BLD: NORMAL
ALBUMIN SERPL-MCNC: 3.8 G/DL (ref 3.4–5)
ALP SERPL-CCNC: 70 U/L (ref 40–150)
ALT SERPL W P-5'-P-CCNC: 31 U/L (ref 0–70)
ANION GAP SERPL CALCULATED.3IONS-SCNC: 6 MMOL/L (ref 3–14)
AST SERPL W P-5'-P-CCNC: 26 U/L (ref 0–45)
BASOPHILS # BLD AUTO: 0 10E9/L (ref 0–0.2)
BASOPHILS NFR BLD AUTO: 0 %
BILIRUB SERPL-MCNC: 0.4 MG/DL (ref 0.2–1.3)
BLD GP AB SCN SERPL QL: NORMAL
BLD PROD TYP BPU: NORMAL
BLD UNIT ID BPU: 0
BLOOD BANK CMNT PATIENT-IMP: NORMAL
BLOOD PRODUCT CODE: NORMAL
BPU ID: NORMAL
BUN SERPL-MCNC: 16 MG/DL (ref 7–30)
CALCIUM SERPL-MCNC: 9.1 MG/DL (ref 8.5–10.1)
CHLORIDE SERPL-SCNC: 105 MMOL/L (ref 94–109)
CO2 SERPL-SCNC: 26 MMOL/L (ref 20–32)
CREAT SERPL-MCNC: 0.91 MG/DL (ref 0.66–1.25)
DIFFERENTIAL METHOD BLD: ABNORMAL
EOSINOPHIL # BLD AUTO: 0 10E9/L (ref 0–0.7)
EOSINOPHIL NFR BLD AUTO: 1.7 %
ERYTHROCYTE [DISTWIDTH] IN BLOOD BY AUTOMATED COUNT: 23.4 % (ref 10–15)
GFR SERPL CREATININE-BSD FRML MDRD: 90 ML/MIN/{1.73_M2}
GLUCOSE SERPL-MCNC: 102 MG/DL (ref 70–99)
HCT VFR BLD AUTO: 23.5 % (ref 40–53)
HGB BLD-MCNC: 7.9 G/DL (ref 13.3–17.7)
IMM GRANULOCYTES # BLD: 0 10E9/L (ref 0–0.4)
IMM GRANULOCYTES NFR BLD: 0.4 %
LYMPHOCYTES # BLD AUTO: 0.5 10E9/L (ref 0.8–5.3)
LYMPHOCYTES NFR BLD AUTO: 21 %
MCH RBC QN AUTO: 38.3 PG (ref 26.5–33)
MCHC RBC AUTO-ENTMCNC: 33.6 G/DL (ref 31.5–36.5)
MCV RBC AUTO: 114 FL (ref 78–100)
MONOCYTES # BLD AUTO: 0.3 10E9/L (ref 0–1.3)
MONOCYTES NFR BLD AUTO: 12.4 %
MYCOBACTERIUM SPEC CULT: NORMAL
NEUTROPHILS # BLD AUTO: 1.5 10E9/L (ref 1.6–8.3)
NEUTROPHILS NFR BLD AUTO: 64.5 %
NRBC # BLD AUTO: 0 10*3/UL
NRBC BLD AUTO-RTO: 0 /100
NUM BPU REQUESTED: 2
PLATELET # BLD AUTO: 15 10E9/L (ref 150–450)
POTASSIUM SERPL-SCNC: 4.2 MMOL/L (ref 3.4–5.3)
PROT SERPL-MCNC: 7.1 G/DL (ref 6.8–8.8)
RBC # BLD AUTO: 2.06 10E12/L (ref 4.4–5.9)
SODIUM SERPL-SCNC: 137 MMOL/L (ref 133–144)
SPECIMEN EXP DATE BLD: NORMAL
SPECIMEN SOURCE: NORMAL
SPECIMEN SOURCE: NORMAL
TRANSFUSION STATUS PATIENT QL: NORMAL
WBC # BLD AUTO: 2.3 10E9/L (ref 4–11)

## 2020-02-11 PROCEDURE — 86901 BLOOD TYPING SEROLOGIC RH(D): CPT | Performed by: PHYSICIAN ASSISTANT

## 2020-02-11 PROCEDURE — 86900 BLOOD TYPING SEROLOGIC ABO: CPT | Performed by: PHYSICIAN ASSISTANT

## 2020-02-11 PROCEDURE — G0463 HOSPITAL OUTPT CLINIC VISIT: HCPCS

## 2020-02-11 PROCEDURE — 80053 COMPREHEN METABOLIC PANEL: CPT | Performed by: PHYSICIAN ASSISTANT

## 2020-02-11 PROCEDURE — 36415 COLL VENOUS BLD VENIPUNCTURE: CPT

## 2020-02-11 PROCEDURE — 85025 COMPLETE CBC W/AUTO DIFF WBC: CPT | Performed by: PHYSICIAN ASSISTANT

## 2020-02-11 PROCEDURE — 86923 COMPATIBILITY TEST ELECTRIC: CPT | Performed by: PHYSICIAN ASSISTANT

## 2020-02-11 PROCEDURE — 40000268 ZZH STATISTIC NO CHARGES: Mod: ZF

## 2020-02-11 PROCEDURE — 86850 RBC ANTIBODY SCREEN: CPT | Performed by: PHYSICIAN ASSISTANT

## 2020-02-11 ASSESSMENT — PAIN SCALES - GENERAL: PAINLEVEL: NO PAIN (0)

## 2020-02-11 NOTE — NURSING NOTE
"Oncology Rooming Note    February 11, 2020 10:12 AM   Angel Yanez is a 59 year old male who presents for:    Chief Complaint   Patient presents with     Blood Draw     labs drawn with vpt by rn.  vs taken     RECHECK     Return: Multiple Myeloma      Initial Vitals: /73 (BP Location: Right arm, Patient Position: Sitting, Cuff Size: Adult Regular)   Pulse 83   Temp 98.3  F (36.8  C) (Oral)   Resp 16   Wt 76.8 kg (169 lb 4.8 oz)   SpO2 100%   BMI 24.29 kg/m   Estimated body mass index is 24.29 kg/m  as calculated from the following:    Height as of 2/4/20: 1.778 m (5' 10\").    Weight as of this encounter: 76.8 kg (169 lb 4.8 oz). Body surface area is 1.95 meters squared.  No Pain (0) Comment: Data Unavailable   No LMP for male patient.  Allergies reviewed: Yes  Medications reviewed: Yes    Medications: Medication refills not needed today.  Pharmacy name entered into EPIC:    [facility] [address]  [facility] - [place], [place] - [address]    Clinical concerns: None       Kathy Perez CMA              "

## 2020-02-11 NOTE — NURSING NOTE
Chief Complaint   Patient presents with     Blood Draw     labs drawn with vpt by rn.  vs taken     Labs drawn with vpt by rn.  Pt tolerated well.  VS taken.  Pt checked in for next appt.    Dari Florentino RN

## 2020-02-18 ENCOUNTER — ONCOLOGY VISIT (OUTPATIENT)
Dept: TRANSPLANT | Facility: CLINIC | Age: 60
End: 2020-02-18
Attending: PHYSICIAN ASSISTANT
Payer: COMMERCIAL

## 2020-02-18 ENCOUNTER — APPOINTMENT (OUTPATIENT)
Dept: LAB | Facility: CLINIC | Age: 60
End: 2020-02-18
Attending: PHYSICIAN ASSISTANT
Payer: COMMERCIAL

## 2020-02-18 VITALS
WEIGHT: 171.9 LBS | HEART RATE: 88 BPM | SYSTOLIC BLOOD PRESSURE: 135 MMHG | TEMPERATURE: 97.9 F | DIASTOLIC BLOOD PRESSURE: 82 MMHG | BODY MASS INDEX: 24.67 KG/M2 | OXYGEN SATURATION: 100 % | RESPIRATION RATE: 18 BRPM

## 2020-02-18 VITALS
SYSTOLIC BLOOD PRESSURE: 129 MMHG | RESPIRATION RATE: 16 BRPM | TEMPERATURE: 98.7 F | HEART RATE: 74 BPM | OXYGEN SATURATION: 99 % | DIASTOLIC BLOOD PRESSURE: 82 MMHG

## 2020-02-18 DIAGNOSIS — C90.00 MULTIPLE MYELOMA NOT HAVING ACHIEVED REMISSION (H): ICD-10-CM

## 2020-02-18 DIAGNOSIS — C90.01 MULTIPLE MYELOMA IN REMISSION (H): ICD-10-CM

## 2020-02-18 DIAGNOSIS — C90.00 MULTIPLE MYELOMA NOT HAVING ACHIEVED REMISSION (H): Primary | ICD-10-CM

## 2020-02-18 DIAGNOSIS — L40.50 PSORIASIS WITH ARTHROPATHY (H): Primary | ICD-10-CM

## 2020-02-18 DIAGNOSIS — L40.50 PSORIASIS WITH ARTHROPATHY (H): ICD-10-CM

## 2020-02-18 LAB
ABO + RH BLD: NORMAL
ABO + RH BLD: NORMAL
ALBUMIN SERPL-MCNC: 3.6 G/DL (ref 3.4–5)
ALP SERPL-CCNC: 63 U/L (ref 40–150)
ALT SERPL W P-5'-P-CCNC: 21 U/L (ref 0–70)
ANION GAP SERPL CALCULATED.3IONS-SCNC: 6 MMOL/L (ref 3–14)
AST SERPL W P-5'-P-CCNC: 12 U/L (ref 0–45)
BASOPHILS # BLD AUTO: 0 10E9/L (ref 0–0.2)
BASOPHILS NFR BLD AUTO: 0.3 %
BILIRUB SERPL-MCNC: 0.5 MG/DL (ref 0.2–1.3)
BLD GP AB SCN SERPL QL: NORMAL
BLD PROD TYP BPU: NORMAL
BLD UNIT ID BPU: 0
BLOOD BANK CMNT PATIENT-IMP: NORMAL
BLOOD PRODUCT CODE: NORMAL
BPU ID: NORMAL
BUN SERPL-MCNC: 16 MG/DL (ref 7–30)
CALCIUM SERPL-MCNC: 9 MG/DL (ref 8.5–10.1)
CHLORIDE SERPL-SCNC: 109 MMOL/L (ref 94–109)
CO2 SERPL-SCNC: 25 MMOL/L (ref 20–32)
CREAT SERPL-MCNC: 0.88 MG/DL (ref 0.66–1.25)
DIFFERENTIAL METHOD BLD: ABNORMAL
EOSINOPHIL # BLD AUTO: 0.1 10E9/L (ref 0–0.7)
EOSINOPHIL NFR BLD AUTO: 2.4 %
ERYTHROCYTE [DISTWIDTH] IN BLOOD BY AUTOMATED COUNT: 20.8 % (ref 10–15)
GFR SERPL CREATININE-BSD FRML MDRD: >90 ML/MIN/{1.73_M2}
GLUCOSE SERPL-MCNC: 94 MG/DL (ref 70–99)
HCT VFR BLD AUTO: 23.1 % (ref 40–53)
HGB BLD-MCNC: 7.9 G/DL (ref 13.3–17.7)
IMM GRANULOCYTES # BLD: 0 10E9/L (ref 0–0.4)
IMM GRANULOCYTES NFR BLD: 0.9 %
LYMPHOCYTES # BLD AUTO: 0.5 10E9/L (ref 0.8–5.3)
LYMPHOCYTES NFR BLD AUTO: 14.7 %
MAGNESIUM SERPL-MCNC: 1.9 MG/DL (ref 1.6–2.3)
MCH RBC QN AUTO: 39.1 PG (ref 26.5–33)
MCHC RBC AUTO-ENTMCNC: 34.2 G/DL (ref 31.5–36.5)
MCV RBC AUTO: 114 FL (ref 78–100)
MONOCYTES # BLD AUTO: 0.3 10E9/L (ref 0–1.3)
MONOCYTES NFR BLD AUTO: 10 %
NEUTROPHILS # BLD AUTO: 2.4 10E9/L (ref 1.6–8.3)
NEUTROPHILS NFR BLD AUTO: 71.7 %
NRBC # BLD AUTO: 0 10*3/UL
NRBC BLD AUTO-RTO: 0 /100
NUM BPU REQUESTED: 1
NUM BPU REQUESTED: 2
PLATELET # BLD AUTO: 2 10E9/L (ref 150–450)
PLATELET # BLD AUTO: 33 10E9/L (ref 150–450)
POTASSIUM SERPL-SCNC: 4.4 MMOL/L (ref 3.4–5.3)
PROT SERPL-MCNC: 6.6 G/DL (ref 6.8–8.8)
RBC # BLD AUTO: 2.02 10E12/L (ref 4.4–5.9)
SODIUM SERPL-SCNC: 140 MMOL/L (ref 133–144)
SPECIMEN EXP DATE BLD: NORMAL
TRANSFUSION STATUS PATIENT QL: NORMAL
WBC # BLD AUTO: 3.4 10E9/L (ref 4–11)

## 2020-02-18 PROCEDURE — 94642 AEROSOL INHALATION TREATMENT: CPT

## 2020-02-18 PROCEDURE — 86923 COMPATIBILITY TEST ELECTRIC: CPT | Performed by: PHYSICIAN ASSISTANT

## 2020-02-18 PROCEDURE — 80053 COMPREHEN METABOLIC PANEL: CPT | Performed by: PHYSICIAN ASSISTANT

## 2020-02-18 PROCEDURE — 86850 RBC ANTIBODY SCREEN: CPT | Performed by: PHYSICIAN ASSISTANT

## 2020-02-18 PROCEDURE — 83735 ASSAY OF MAGNESIUM: CPT | Performed by: PHYSICIAN ASSISTANT

## 2020-02-18 PROCEDURE — 25000128 H RX IP 250 OP 636: Mod: ZF | Performed by: PHYSICIAN ASSISTANT

## 2020-02-18 PROCEDURE — 86900 BLOOD TYPING SEROLOGIC ABO: CPT | Performed by: PHYSICIAN ASSISTANT

## 2020-02-18 PROCEDURE — 36430 TRANSFUSION BLD/BLD COMPNT: CPT

## 2020-02-18 PROCEDURE — 86901 BLOOD TYPING SEROLOGIC RH(D): CPT | Performed by: PHYSICIAN ASSISTANT

## 2020-02-18 PROCEDURE — 85025 COMPLETE CBC W/AUTO DIFF WBC: CPT | Performed by: PHYSICIAN ASSISTANT

## 2020-02-18 PROCEDURE — 96374 THER/PROPH/DIAG INJ IV PUSH: CPT

## 2020-02-18 PROCEDURE — G0463 HOSPITAL OUTPT CLINIC VISIT: HCPCS | Mod: 25

## 2020-02-18 PROCEDURE — 85049 AUTOMATED PLATELET COUNT: CPT | Performed by: PHYSICIAN ASSISTANT

## 2020-02-18 PROCEDURE — 25000125 ZZHC RX 250: Mod: ZF | Performed by: PHYSICIAN ASSISTANT

## 2020-02-18 PROCEDURE — P9037 PLATE PHERES LEUKOREDU IRRAD: HCPCS | Performed by: PHYSICIAN ASSISTANT

## 2020-02-18 RX ORDER — HEPARIN SODIUM,PORCINE 10 UNIT/ML
5 VIAL (ML) INTRAVENOUS
Status: CANCELLED | OUTPATIENT
Start: 2020-03-18

## 2020-02-18 RX ORDER — ALBUTEROL SULFATE 5 MG/ML
2.5 SOLUTION RESPIRATORY (INHALATION)
Status: CANCELLED
Start: 2020-03-18

## 2020-02-18 RX ORDER — ALBUTEROL SULFATE 0.83 MG/ML
2.5 SOLUTION RESPIRATORY (INHALATION)
Status: DISCONTINUED | OUTPATIENT
Start: 2020-02-18 | End: 2020-02-18 | Stop reason: HOSPADM

## 2020-02-18 RX ORDER — PENTAMIDINE ISETHIONATE 300 MG/300MG
300 INHALANT RESPIRATORY (INHALATION)
Status: CANCELLED
Start: 2020-03-18

## 2020-02-18 RX ADMIN — PENTAMIDINE ISETHIONATE 300 MG: 300 INJECTION, POWDER, LYOPHILIZED, FOR SOLUTION INTRAMUSCULAR; INTRAVENOUS at 10:20

## 2020-02-18 RX ADMIN — HYDROCORTISONE SODIUM SUCCINATE 100 MG: 100 INJECTION, POWDER, FOR SOLUTION INTRAMUSCULAR; INTRAVENOUS at 12:26

## 2020-02-18 RX ADMIN — ALBUTEROL SULFATE 2.5 MG: 2.5 SOLUTION RESPIRATORY (INHALATION) at 10:05

## 2020-02-18 ASSESSMENT — PAIN SCALES - GENERAL: PAINLEVEL: NO PAIN (0)

## 2020-02-18 NOTE — PROGRESS NOTES
"Infusion Nursing Note:  Angel Yanez presents today for plts.    Patient seen by provider today: Yes: SHANNON Mena   present during visit today: Not Applicable.    Note: Pt given 1u plts.  Post plt check 33K.  Tolerated well with no hives, erythema, fever.  Pt did report a \"mild sensation of a lump on the right side of my neck.\"  No swelling noted.  Lana Reynolds assessed patient.  Pt given 100 mg IV Solu-Cortef with no change in symptoms after 45 minutes.  No distress noted.  Pt instructed by Lana to take benadryl at home (pt drove himself so benadryl was deferred while in clinic) and go to the ER if symptoms escalated.  No signs of distress, no pain, VSS.  Pt discharged to home.    Intravenous Access:  Peripheral IV placed.    Treatment Conditions:  Lab Results   Component Value Date    HGB 7.9 02/18/2020     Lab Results   Component Value Date    WBC 3.4 02/18/2020      Lab Results   Component Value Date    ANEU 2.4 02/18/2020     Lab Results   Component Value Date    PLT 33 02/18/2020      Results reviewed, labs MET treatment parameters, ok to proceed with treatment.      Post Infusion Assessment:  Patient tolerated infusion as described above.  Blood return noted pre and post infusion.  Site patent and intact, free from redness, edema or discomfort.  No evidence of extravasations.       Discharge Plan:   Discharge instructions reviewed with: Patient.  Patient and/or family verbalized understanding of discharge instructions and all questions answered.  Copy of AVS reviewed with patient and/or family.  Patient will return 02/21 for next appointment.  Patient discharged in stable condition accompanied by: self.  Departure Mode: Ambulatory.    Rosana Brown RN                        "

## 2020-02-18 NOTE — PROGRESS NOTES
BMT Daily Progress Note        Patient ID:  Angel Yanez is a 60 yo man D+277 s/p 2nd autologous transplant for MM.   S/p 2 readmissions for FUO (10/16-10/23; 9/23-9/29/19). Readmitted on 12/9/2019 with return of fevers despite celebrex, FARZANA, worsening sob and return of rash         Diagnosis MM Multiple myeloma  HCT Type Autologous    Prep Regimen Cytoxan  Melphalan   Donor Source No data was found    GVHD Prophylaxis No  Primary BMT Provider [facility]         INTERVAL  HISTORY   Returns for follow up. Feeling better, URI sx improved. No n/v/d/c. Mild nose bleed this morning.    Review of Systems: 8 point ROS negative except as noted above.        PHYSICAL EXAM   Blood pressure 135/82, pulse 88, temperature 97.9  F (36.6  C), temperature source Oral, resp. rate 18, weight 78 kg (171 lb 14.4 oz), SpO2 100 %.    Wt Readings from Last 4 Encounters:   02/18/20 78 kg (171 lb 14.4 oz)   02/11/20 76.8 kg (169 lb 4.8 oz)   02/04/20 77.6 kg (171 lb 1.6 oz)   01/28/20 77.3 kg (170 lb 8 oz)     General: NAD   Eyes: sclera anicteric   Nose/Mouth/Throat: OP moist, petechiae x1 on each buccal mucosa   Neck: supple, no LAD  Lungs:  CTAB   Cardiovascular: RRR, no M/R/G   Abdominal/Rectal: +BS, soft, NT, ND  Lymphatics: no edema  Skin: no rashes or petechiae   No central access     Labs:  Lab Results   Component Value Date    WBC 3.4 (L) 02/18/2020    ANEU 2.4 02/18/2020    HGB 7.9 (L) 02/18/2020    HCT 23.1 (L) 02/18/2020    PLT 2 (LL) 02/18/2020     02/11/2020    POTASSIUM 4.2 02/11/2020    CHLORIDE 105 02/11/2020    CO2 26 02/11/2020     (H) 02/11/2020    BUN 16 02/11/2020    CR 0.91 02/11/2020    MAG 2.2 01/03/2020    INR 1.53 (H) 12/09/2019    BILITOTAL 0.4 02/11/2020    AST 26 02/11/2020    ALT 31 02/11/2020    ALKPHOS 70 02/11/2020    PROTTOTAL 7.1 02/11/2020    ALBUMIN 3.8 02/11/2020        ASSESSMENTPLAN   Angel Yanez is a 60 yo man D+277 s/p 2nd autologous transplant for MM.   S/p 2 readmissions for FUO  (10/16-10/23; 9/23-9/29/19).      1.  BMT/MM:   Slow engraftment; cell dose was only 0.639x10^6. ([facility] [place] - known poor cell dose as failed chemo-mobilization 1/2019.)   - day +180 bone marrow biopsy 11/6/19 which showed no morphologic or immunophenotypic evidence of plasma cell neoplasm. His light chains were not elevated and he had no monoclonal protein in the serum.  He is in clinical remission.  - PET in 8/2019 clear.   - PB FLOW 11/23: No overt aberrant immunophenotype on T cells.  Rare to absent mature B cells and plasma cells.      2.  HEME: Keep Hgb>8g/dL, plt >10k. Plts only 2k today (15k last week). Post-plt count 33k after 1 dose. Pt reported a 'lump' R throat, some difficultly swallowing after plt transfusion. No chest pain, wheezing, dyspnea, pruritis, or rash. hydrocort 100mg IV x1-->no change in sx and no new sx. Unlikely transfusion reaction. Pt will take Benadryl when he gets home and monitor sx. Seek immediate medical attention if wheezing, hives, worsening dysphagia. Exam unremarkable and managing oral secretions without problems.  -  Last GCSF 1/21/2020. None needed today  - Persistent thrombocytopenia: BM 11/6/19 showed nearly 0 platelet precursors. Trial of promacta not helpful. Stopped 12/6/19.    - hgb stable at 7.9. Will not transfuse today. Preorder possible RBCs for next visit. (consider lowering parameter).      3.  ID: URI sx improved.  FUOs: Extensive ID work up- no etiology identified. Now afebrile since starting Pred.   - 12/13 EBUS bx with ngtd.- No actual tissue obtained.  - 12/9 RVP=neg  - 12/9 CT chest is stable with GGO better. No antibiotics given during admission because do not feel like fevers are infection.  - Per Dr. Lucio, start pred taper over 5-6 weeks (see schedule in Epic). Started this 1/15; ends 2/20.     Prophy: ACV, azithro (pred)-stop when off pred this week, fluconazole, pentamidine (2/18)  t cell subset, Absolute US6=446.   - influenza vaccination given  11/26/19    4. GI:  no complaints. LFTs wnl 2/18    5. CV: HTN, reported as steroid-induced  - will stop lisinopril 5mg/d when steroids stop this week and monitor    6.  FEN/Renal:   Cr and lytes WNL     7.  Mood: Continue Paxil.     8. Derm:   - Hx of rash and skin bx 11/15 consistent with chemotheapy-induced [place]'s disease. Resolved. Recurrence 12/9- resolved with Triamcinolone TID.     9. FUOs- Some kind of auto immune process DDX:  Sarcoidosis? Underwent EBUS FNA which was negative for granulomas however unlikely you could see this on just fluid aspirate so would not consider   - on prednisone taper (as above)    10. Psych:  Mood stable on paroxitine      Final Plan:  plts today  Ok to stop lisinopril, azithro when done with pred taper 2/20   RTC Friday for labs, f/u, possible transfusions; sooner prn.  Next avail w/ Dr. Naveed Reynolds, PA-C  [phone]

## 2020-02-18 NOTE — NURSING NOTE
"Oncology Rooming Note    February 18, 2020 9:53 AM   Angel Yanez is a 59 year old male who presents for:    Chief Complaint   Patient presents with     Blood Draw     VPt blood draw and vitals     RECHECK     Pt is here for a rtn for MM S/P BMT     Initial Vitals: Blood Pressure 135/82   Pulse 88   Temperature 97.9  F (36.6  C) (Oral)   Respiration 18   Weight 78 kg (171 lb 14.4 oz)   Oxygen Saturation 100%   Body Mass Index 24.67 kg/m   Estimated body mass index is 24.67 kg/m  as calculated from the following:    Height as of 2/4/20: 1.778 m (5' 10\").    Weight as of this encounter: 78 kg (171 lb 14.4 oz). Body surface area is 1.96 meters squared.  No Pain (0) Comment: Data Unavailable   No LMP for male patient.  Allergies reviewed: Yes  Medications reviewed: Yes    Medications: Medication refills not needed today.  Pharmacy name entered into EPIC:    [facility] - [place] - [address] [facility] - [place], [place] - [address]    Clinical concerns: none       Shoshana Salazar MA            "

## 2020-02-20 NOTE — PROGRESS NOTES
BMT Daily Progress Note        Patient ID:  Angel Yanez is a 58 yo man D+280 s/p 2nd autologous transplant for MM.   S/p 2 readmissions for FUO (10/16-10/23; 9/23-9/29/19). Readmitted on 12/9/2019 with return of fevers despite celebrex, FARZANA, worsening sob and return of rash         Diagnosis MM Multiple myeloma  HCT Type Autologous    Prep Regimen Cytoxan  Melphalan   Donor Source No data was found    GVHD Prophylaxis No  Primary BMT Provider [facility]         INTERVAL  HISTORY   Returns for follow up. Feeling well. Was hoping he wouldn't need transfusions today. Deneis fevers, cough, or bleeding. No GI complaints. Last dose of pred on taper was yesterday 2/20. Working part-time.    Review of Systems: 8 point ROS negative except as noted above.        PHYSICAL EXAM   Blood pressure 126/74, pulse 90, temperature 97.8  F (36.6  C), temperature source Oral, resp. rate 16, weight 79.1 kg (174 lb 4.8 oz), SpO2 100 %.    Wt Readings from Last 4 Encounters:   02/21/20 79.1 kg (174 lb 4.8 oz)   02/18/20 78 kg (171 lb 14.4 oz)   02/11/20 76.8 kg (169 lb 4.8 oz)   02/04/20 77.6 kg (171 lb 1.6 oz)     General: NAD   Eyes: sclera anicteric   Nose/Mouth/Throat: OP moist, petechiae x1 on each buccal mucosa   Neck: supple  Lungs:  CTAB   Cardiovascular: RRR, no M/R/G   Abdominal/Rectal: +BS, soft, NT, ND  Lymphatics: no edema  Skin: no rashes or petechiae   No central access     Labs:  Lab Results   Component Value Date    WBC 2.3 (L) 02/21/2020    ANEU 1.2 (L) 02/21/2020    HGB 7.4 (L) 02/21/2020    HCT 22.2 (L) 02/21/2020    PLT 6 (LL) 02/21/2020     02/21/2020    POTASSIUM 4.2 02/21/2020    CHLORIDE 112 (H) 02/21/2020    CO2 26 02/21/2020    GLC 92 02/21/2020    BUN 15 02/21/2020    CR 0.97 02/21/2020    MAG 1.9 02/18/2020    INR 1.53 (H) 12/09/2019    BILITOTAL 0.5 02/18/2020    AST 12 02/18/2020    ALT 21 02/18/2020    ALKPHOS 63 02/18/2020    PROTTOTAL 6.6 (L) 02/18/2020    ALBUMIN 3.6 02/18/2020           ASSESSMENTPLAN   Angel Yanez is a 60 yo man D+280 s/p 2nd autologous transplant for MM.   S/p 2 readmissions for FUO (10/16-10/23; 9/23-9/29/19).      1.  BMT/MM:   Slow engraftment; cell dose was only 0.639x10^6. ([facility] [place] - known poor cell dose as failed chemo-mobilization 1/2019.)   - day +180 bone marrow biopsy 11/6/19 which showed no morphologic or immunophenotypic evidence of plasma cell neoplasm. His light chains were not elevated and he had no monoclonal protein in the serum.  He is in clinical remission.  - PET in 8/2019 clear.   - PB FLOW 11/23: No overt aberrant immunophenotype on T cells.  Rare to absent mature B cells and plasma cells.   - per email discussion with Dr. Lucio (2/21), plan for BMbx in the next few weeks. Called pt to discuss timing but unable to reach 2/21. Discuss next visit.      2.  HEME: Keep Hgb>8g/dL, plt >10k. plts and 1u RBCs today.  -  Last GCSF 1/21/2020. None needed today but ANC trending down.  - Persistent thrombocytopenia: BM 11/6/19 showed nearly 0 platelet precursors. Trial of promacta not helpful. Stopped 12/6/19.       3.  ID: URI sx improved.  FUOs: Extensive ID work up- no etiology identified. Now afebrile since starting Pred.   - 12/13 EBUS bx with ngtd.- No actual tissue obtained.  - 12/9 RVP=neg  - 12/9 CT chest is stable with GGO better. No antibiotics given during admission because do not feel like fevers are infection.  - empiric steroids; pred taper ended 2/20     Prophy: ACV, pentamidine (2/18). Levo/fluc stopped now off pred. Consider resuming if intermittently neutropenic.  t cell subset, Absolute KZ0=582.   - influenza vaccination given 11/26/19    4. GI:  no complaints. LFTs wnl 2/18    5. CV: HTN, reported as steroid-induced  - now off lisinopril since off steroids; monitor BP    6.  FEN/Renal:   Cr and lytes WNL     7.  Mood: Continue Paxil.     8. 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 Resolved. Recurrence 12/9- resolved with Triamcinolone TID.     9. FUOs- Some kind of auto immune process DDX:  Sarcoidosis? Underwent EBUS FNA which was negative for granulomas however unlikely you could see this on just fluid aspirate so would not consider   - completed pred taper 2/20/2020 as above    10. Psych:  Mood stable on paroxitine      Final Plan:  plts & RBCs today  RTC Tuesday for labs, f/u, possible transfusions; sooner prn.  2/27 with Dr. Naveed Reynolds, PA-C  [phone]

## 2020-02-21 ENCOUNTER — ONCOLOGY VISIT (OUTPATIENT)
Dept: TRANSPLANT | Facility: CLINIC | Age: 60
End: 2020-02-21
Attending: INTERNAL MEDICINE
Payer: COMMERCIAL

## 2020-02-21 ENCOUNTER — APPOINTMENT (OUTPATIENT)
Dept: LAB | Facility: CLINIC | Age: 60
End: 2020-02-21
Attending: INTERNAL MEDICINE
Payer: COMMERCIAL

## 2020-02-21 VITALS
OXYGEN SATURATION: 98 % | HEART RATE: 69 BPM | RESPIRATION RATE: 18 BRPM | SYSTOLIC BLOOD PRESSURE: 138 MMHG | DIASTOLIC BLOOD PRESSURE: 81 MMHG | TEMPERATURE: 98.3 F

## 2020-02-21 VITALS
HEART RATE: 90 BPM | SYSTOLIC BLOOD PRESSURE: 126 MMHG | TEMPERATURE: 97.8 F | WEIGHT: 174.3 LBS | BODY MASS INDEX: 25.01 KG/M2 | OXYGEN SATURATION: 100 % | DIASTOLIC BLOOD PRESSURE: 74 MMHG | RESPIRATION RATE: 16 BRPM

## 2020-02-21 DIAGNOSIS — C90.01 MULTIPLE MYELOMA IN REMISSION (H): Primary | ICD-10-CM

## 2020-02-21 DIAGNOSIS — L40.50 PSORIASIS WITH ARTHROPATHY (H): ICD-10-CM

## 2020-02-21 DIAGNOSIS — C90.00 MULTIPLE MYELOMA NOT HAVING ACHIEVED REMISSION (H): Primary | ICD-10-CM

## 2020-02-21 LAB
ANION GAP SERPL CALCULATED.3IONS-SCNC: 6 MMOL/L (ref 3–14)
BASOPHILS # BLD AUTO: 0 10E9/L (ref 0–0.2)
BASOPHILS NFR BLD AUTO: 0.4 %
BLD PROD TYP BPU: NORMAL
BLD UNIT ID BPU: 0
BLOOD PRODUCT CODE: NORMAL
BPU ID: NORMAL
BUN SERPL-MCNC: 15 MG/DL (ref 7–30)
CALCIUM SERPL-MCNC: 8.9 MG/DL (ref 8.5–10.1)
CHLORIDE SERPL-SCNC: 112 MMOL/L (ref 94–109)
CMV DNA SPEC NAA+PROBE-ACNC: NORMAL [IU]/ML
CMV DNA SPEC NAA+PROBE-LOG#: NORMAL {LOG_IU}/ML
CO2 SERPL-SCNC: 26 MMOL/L (ref 20–32)
CREAT SERPL-MCNC: 0.97 MG/DL (ref 0.66–1.25)
DIFFERENTIAL METHOD BLD: ABNORMAL
EOSINOPHIL # BLD AUTO: 0.1 10E9/L (ref 0–0.7)
EOSINOPHIL NFR BLD AUTO: 4.4 %
ERYTHROCYTE [DISTWIDTH] IN BLOOD BY AUTOMATED COUNT: 19.7 % (ref 10–15)
GFR SERPL CREATININE-BSD FRML MDRD: 84 ML/MIN/{1.73_M2}
GLUCOSE SERPL-MCNC: 92 MG/DL (ref 70–99)
HCT VFR BLD AUTO: 22.2 % (ref 40–53)
HGB BLD-MCNC: 7.4 G/DL (ref 13.3–17.7)
IMM GRANULOCYTES # BLD: 0 10E9/L (ref 0–0.4)
IMM GRANULOCYTES NFR BLD: 0.9 %
LYMPHOCYTES # BLD AUTO: 0.7 10E9/L (ref 0.8–5.3)
LYMPHOCYTES NFR BLD AUTO: 29.4 %
MCH RBC QN AUTO: 38.9 PG (ref 26.5–33)
MCHC RBC AUTO-ENTMCNC: 33.3 G/DL (ref 31.5–36.5)
MCV RBC AUTO: 117 FL (ref 78–100)
MONOCYTES # BLD AUTO: 0.3 10E9/L (ref 0–1.3)
MONOCYTES NFR BLD AUTO: 14.5 %
NEUTROPHILS # BLD AUTO: 1.2 10E9/L (ref 1.6–8.3)
NEUTROPHILS NFR BLD AUTO: 50.4 %
NRBC # BLD AUTO: 0 10*3/UL
NRBC BLD AUTO-RTO: 0 /100
PLATELET # BLD AUTO: 6 10E9/L (ref 150–450)
PLATELET # BLD EST: ABNORMAL 10*3/UL
POTASSIUM SERPL-SCNC: 4.2 MMOL/L (ref 3.4–5.3)
RBC # BLD AUTO: 1.9 10E12/L (ref 4.4–5.9)
SODIUM SERPL-SCNC: 144 MMOL/L (ref 133–144)
SPECIMEN SOURCE: NORMAL
TRANSFUSION STATUS PATIENT QL: NORMAL
WBC # BLD AUTO: 2.3 10E9/L (ref 4–11)

## 2020-02-21 PROCEDURE — 85025 COMPLETE CBC W/AUTO DIFF WBC: CPT | Performed by: PHYSICIAN ASSISTANT

## 2020-02-21 PROCEDURE — P9037 PLATE PHERES LEUKOREDU IRRAD: HCPCS | Performed by: PHYSICIAN ASSISTANT

## 2020-02-21 PROCEDURE — 80048 BASIC METABOLIC PNL TOTAL CA: CPT | Performed by: PHYSICIAN ASSISTANT

## 2020-02-21 PROCEDURE — P9040 RBC LEUKOREDUCED IRRADIATED: HCPCS | Performed by: PHYSICIAN ASSISTANT

## 2020-02-21 PROCEDURE — G0463 HOSPITAL OUTPT CLINIC VISIT: HCPCS | Mod: 25

## 2020-02-21 PROCEDURE — 36430 TRANSFUSION BLD/BLD COMPNT: CPT

## 2020-02-21 ASSESSMENT — PAIN SCALES - GENERAL: PAINLEVEL: NO PAIN (0)

## 2020-02-21 NOTE — NURSING NOTE
"Oncology Rooming Note    February 21, 2020 7:17 AM   Angel Yanez is a 59 year old male who presents for:    Chief Complaint   Patient presents with     Blood Draw     labs drawn by venipuncture by rn.  vital signs taken.     RECHECK     provider visit, possible transfusion s/p bmt txp for multiple myeloma     Initial Vitals: /74 (BP Location: Right arm, Patient Position: Sitting, Cuff Size: Adult Regular)   Pulse 90   Temp 97.8  F (36.6  C) (Oral)   Resp 16   Wt 79.1 kg (174 lb 4.8 oz)   SpO2 100%   BMI 25.01 kg/m   Estimated body mass index is 25.01 kg/m  as calculated from the following:    Height as of 2/4/20: 1.778 m (5' 10\").    Weight as of this encounter: 79.1 kg (174 lb 4.8 oz). Body surface area is 1.98 meters squared.  No Pain (0) Comment: Data Unavailable   No LMP for male patient.  Allergies reviewed: Yes  Medications reviewed: Yes    Medications: Medication refills not needed today.  Pharmacy name entered into EPIC:    [facility] - [place], [place] - [address] [facility] - [place], [place] - [address] [facility]    Clinical concerns: Patient denies fevers/N/V/D.  He has no pain/discomfort today.  Patient has no complaints.  He is concerned about his counts.      CLARA ELLSWORTH RN              "

## 2020-02-21 NOTE — PROGRESS NOTES
Infusion Nursing Note:  Angel SUMMER Renata presents today for add-on transfusion.    Patient seen by provider today: Yes: Lana Reynolds   present during visit today: Not Applicable.    Note: Labs were monitored.    Intravenous Access:  Peripheral IV placed.    Treatment Conditions:  Patient received an add-on platelet transfusion for a platelet count of 6.  He received on unit of red blood cells for a Hgb of 7.4.      Post Infusion Assessment:  Patient tolerated transfusions without incident.       Discharge Plan:   Patient discharged in stable condition accompanied by: self.    CLARA ELLSWORTH RN

## 2020-02-25 ENCOUNTER — ONCOLOGY VISIT (OUTPATIENT)
Dept: TRANSPLANT | Facility: CLINIC | Age: 60
End: 2020-02-25
Attending: INTERNAL MEDICINE
Payer: COMMERCIAL

## 2020-02-25 ENCOUNTER — APPOINTMENT (OUTPATIENT)
Dept: LAB | Facility: CLINIC | Age: 60
End: 2020-02-25
Attending: INTERNAL MEDICINE
Payer: COMMERCIAL

## 2020-02-25 VITALS
OXYGEN SATURATION: 100 % | DIASTOLIC BLOOD PRESSURE: 84 MMHG | WEIGHT: 174 LBS | TEMPERATURE: 98.4 F | SYSTOLIC BLOOD PRESSURE: 139 MMHG | BODY MASS INDEX: 24.97 KG/M2 | HEART RATE: 87 BPM | RESPIRATION RATE: 16 BRPM

## 2020-02-25 VITALS
HEART RATE: 68 BPM | TEMPERATURE: 97.9 F | DIASTOLIC BLOOD PRESSURE: 79 MMHG | OXYGEN SATURATION: 100 % | RESPIRATION RATE: 16 BRPM | SYSTOLIC BLOOD PRESSURE: 138 MMHG

## 2020-02-25 DIAGNOSIS — C90.00 MULTIPLE MYELOMA NOT HAVING ACHIEVED REMISSION (H): Primary | ICD-10-CM

## 2020-02-25 DIAGNOSIS — L40.50 PSORIASIS WITH ARTHROPATHY (H): Primary | ICD-10-CM

## 2020-02-25 DIAGNOSIS — C90.01 MULTIPLE MYELOMA IN REMISSION (H): ICD-10-CM

## 2020-02-25 LAB
ALBUMIN SERPL-MCNC: 3.8 G/DL (ref 3.4–5)
ALP SERPL-CCNC: 68 U/L (ref 40–150)
ALT SERPL W P-5'-P-CCNC: 15 U/L (ref 0–70)
ANION GAP SERPL CALCULATED.3IONS-SCNC: 6 MMOL/L (ref 3–14)
AST SERPL W P-5'-P-CCNC: 17 U/L (ref 0–45)
BASOPHILS # BLD AUTO: 0 10E9/L (ref 0–0.2)
BASOPHILS NFR BLD AUTO: 0.6 %
BILIRUB SERPL-MCNC: 0.7 MG/DL (ref 0.2–1.3)
BLD PROD TYP BPU: NORMAL
BLD UNIT ID BPU: 0
BLD UNIT ID BPU: 0
BLOOD PRODUCT CODE: NORMAL
BLOOD PRODUCT CODE: NORMAL
BPU ID: NORMAL
BPU ID: NORMAL
BUN SERPL-MCNC: 16 MG/DL (ref 7–30)
CALCIUM SERPL-MCNC: 9.1 MG/DL (ref 8.5–10.1)
CHLORIDE SERPL-SCNC: 110 MMOL/L (ref 94–109)
CO2 SERPL-SCNC: 26 MMOL/L (ref 20–32)
CREAT SERPL-MCNC: 0.88 MG/DL (ref 0.66–1.25)
DIFFERENTIAL METHOD BLD: ABNORMAL
EOSINOPHIL # BLD AUTO: 0.1 10E9/L (ref 0–0.7)
EOSINOPHIL NFR BLD AUTO: 5.1 %
ERYTHROCYTE [DISTWIDTH] IN BLOOD BY AUTOMATED COUNT: 22.6 % (ref 10–15)
GFR SERPL CREATININE-BSD FRML MDRD: >90 ML/MIN/{1.73_M2}
GLUCOSE SERPL-MCNC: 108 MG/DL (ref 70–99)
HCT VFR BLD AUTO: 25.4 % (ref 40–53)
HGB BLD-MCNC: 8.4 G/DL (ref 13.3–17.7)
IMM GRANULOCYTES # BLD: 0 10E9/L (ref 0–0.4)
IMM GRANULOCYTES NFR BLD: 0 %
LYMPHOCYTES # BLD AUTO: 0.6 10E9/L (ref 0.8–5.3)
LYMPHOCYTES NFR BLD AUTO: 33.1 %
MCH RBC QN AUTO: 36.5 PG (ref 26.5–33)
MCHC RBC AUTO-ENTMCNC: 33.1 G/DL (ref 31.5–36.5)
MCV RBC AUTO: 110 FL (ref 78–100)
MONOCYTES # BLD AUTO: 0.3 10E9/L (ref 0–1.3)
MONOCYTES NFR BLD AUTO: 14.9 %
NEUTROPHILS # BLD AUTO: 0.8 10E9/L (ref 1.6–8.3)
NEUTROPHILS NFR BLD AUTO: 46.3 %
NRBC # BLD AUTO: 0 10*3/UL
NRBC BLD AUTO-RTO: 0 /100
NUM BPU REQUESTED: 1
PLATELET # BLD AUTO: 1 10E9/L (ref 150–450)
PLATELET # BLD EST: ABNORMAL 10*3/UL
POTASSIUM SERPL-SCNC: 4.1 MMOL/L (ref 3.4–5.3)
PROT SERPL-MCNC: 6.9 G/DL (ref 6.8–8.8)
RBC # BLD AUTO: 2.3 10E12/L (ref 4.4–5.9)
SODIUM SERPL-SCNC: 142 MMOL/L (ref 133–144)
TRANSFUSION STATUS PATIENT QL: NORMAL
WBC # BLD AUTO: 1.8 10E9/L (ref 4–11)

## 2020-02-25 PROCEDURE — 86850 RBC ANTIBODY SCREEN: CPT | Performed by: INTERNAL MEDICINE

## 2020-02-25 PROCEDURE — P9037 PLATE PHERES LEUKOREDU IRRAD: HCPCS | Performed by: PHYSICIAN ASSISTANT

## 2020-02-25 PROCEDURE — 86923 COMPATIBILITY TEST ELECTRIC: CPT | Performed by: INTERNAL MEDICINE

## 2020-02-25 PROCEDURE — 80053 COMPREHEN METABOLIC PANEL: CPT | Performed by: PHYSICIAN ASSISTANT

## 2020-02-25 PROCEDURE — 86900 BLOOD TYPING SEROLOGIC ABO: CPT | Performed by: INTERNAL MEDICINE

## 2020-02-25 PROCEDURE — 86901 BLOOD TYPING SEROLOGIC RH(D): CPT | Performed by: INTERNAL MEDICINE

## 2020-02-25 PROCEDURE — 85025 COMPLETE CBC W/AUTO DIFF WBC: CPT | Performed by: PHYSICIAN ASSISTANT

## 2020-02-25 PROCEDURE — 36430 TRANSFUSION BLD/BLD COMPNT: CPT

## 2020-02-25 PROCEDURE — 25000128 H RX IP 250 OP 636: Mod: ZF | Performed by: STUDENT IN AN ORGANIZED HEALTH CARE EDUCATION/TRAINING PROGRAM

## 2020-02-25 PROCEDURE — 96372 THER/PROPH/DIAG INJ SC/IM: CPT

## 2020-02-25 PROCEDURE — 87799 DETECT AGENT NOS DNA QUANT: CPT | Performed by: PHYSICIAN ASSISTANT

## 2020-02-25 RX ORDER — FLUCONAZOLE 100 MG/1
100 TABLET ORAL DAILY
Qty: 30 TABLET | Refills: 0 | Status: SHIPPED | OUTPATIENT
Start: 2020-02-25 | End: 2020-04-03

## 2020-02-25 RX ORDER — HEPARIN SODIUM,PORCINE 10 UNIT/ML
5 VIAL (ML) INTRAVENOUS
Status: CANCELLED | OUTPATIENT
Start: 2020-03-18

## 2020-02-25 RX ORDER — ALBUTEROL SULFATE 5 MG/ML
2.5 SOLUTION RESPIRATORY (INHALATION)
Status: CANCELLED
Start: 2020-03-18

## 2020-02-25 RX ORDER — LEVOFLOXACIN 250 MG/1
250 TABLET, FILM COATED ORAL DAILY
Qty: 30 TABLET | Refills: 0 | Status: SHIPPED | OUTPATIENT
Start: 2020-02-25 | End: 2020-03-12

## 2020-02-25 RX ORDER — PENTAMIDINE ISETHIONATE 300 MG/300MG
300 INHALANT RESPIRATORY (INHALATION)
Status: CANCELLED
Start: 2020-03-18

## 2020-02-25 RX ADMIN — FILGRASTIM 480 MCG: 480 INJECTION, SOLUTION INTRAVENOUS; SUBCUTANEOUS at 10:10

## 2020-02-25 ASSESSMENT — PAIN SCALES - GENERAL: PAINLEVEL: NO PAIN (0)

## 2020-02-25 NOTE — PROGRESS NOTES
BMT Daily Progress Note        Patient ID:  Angel Yanez is a 60 yo man D+284 s/p 2nd autologous transplant for MM.   S/p 2 readmissions for FUO (10/16-10/23; 9/23-9/29/19). Readmitted on 12/9/2019 with return of fevers despite celebrex, FARZANA, worsening sob and return of rash         Diagnosis MM Multiple myeloma  HCT Type Autologous    Prep Regimen Cytoxan  Melphalan   Donor Source No data was found    GVHD Prophylaxis No  Primary BMT Provider [facility]         INTERVAL  HISTORY   Returns for follow up. Discouraged his counts have continued to down trend. He notes some spots in his mouth that bleed. His ankles have red dotted rash. He denies blood in the urine or stool, no nose bleeds. Had a coughing jag yesterday, seem irritated by some milk he was drinking. No other cough or cold symptoms, no fevers, chills.     Review of Systems: 8 point ROS negative except as noted above.        PHYSICAL EXAM   Blood pressure 139/84, pulse 87, temperature 98.4  F (36.9  C), temperature source Oral, resp. rate 16, weight 78.9 kg (174 lb), SpO2 100 %.    Wt Readings from Last 4 Encounters:   02/25/20 78.9 kg (174 lb)   02/21/20 79.1 kg (174 lb 4.8 oz)   02/18/20 78 kg (171 lb 14.4 oz)   02/11/20 76.8 kg (169 lb 4.8 oz)     General: NAD   Eyes: sclera anicteric, conjunctiva clear   Nose/Mouth/Throat: OP moist, scab over bleed in back upper gum approx 3mm wide, large blood blister on OP approx 1cm wide, 2-3 scattered petechiae   Neck: supple  Lungs:  CTAB   Cardiovascular: RRR, no M/R/G   Abdominal/Rectal: +BS, soft, NT, ND  Lymphatics: no edema  Skin: bilateral LE petechiae, no other visible rash  No central access     Labs:  Lab Results   Component Value Date    WBC 1.8 (L) 02/25/2020    ANEU 0.8 (L) 02/25/2020    HGB 8.4 (L) 02/25/2020    HCT 25.4 (L) 02/25/2020    PLT 1 (LL) 02/25/2020     02/25/2020    POTASSIUM 4.1 02/25/2020    CHLORIDE 110 (H) 02/25/2020    CO2 26 02/25/2020     (H) 02/25/2020    BUN 16  02/25/2020    CR 0.88 02/25/2020    MAG 1.9 02/18/2020    INR 1.53 (H) 12/09/2019    BILITOTAL 0.7 02/25/2020    AST 17 02/25/2020    ALT 15 02/25/2020    ALKPHOS 68 02/25/2020    PROTTOTAL 6.9 02/25/2020    ALBUMIN 3.8 02/25/2020          ASSESSMENTPLAN   Angel Yanez is a 60 yo man D+284 s/p 2nd autologous transplant for MM.   S/p 2 readmissions for FUO (10/16-10/23; 9/23-9/29/19).      1.  BMT/MM:   Slow engraftment; cell dose was only 0.639x10^6. ([facility] [place] - known poor cell dose as failed chemo-mobilization 1/2019.)   - day +180 bone marrow biopsy 11/6/19 which showed no morphologic or immunophenotypic evidence of plasma cell neoplasm. His light chains were not elevated and he had no monoclonal protein in the serum.  He is in clinical remission.  - PET in 8/2019 clear.   - PB FLOW 11/23: No overt aberrant immunophenotype on T cells.  Rare to absent mature B cells and plasma cells.   - per email discussion with Dr. Lucio (2/21), plan for BMbx in the next few weeks. Called pt to discuss timing but unable to reach 2/21. Discuss next visit.      2.  HEME: Keep Hgb>8g/dL, plt >10k. plts and 1u RBCs today.  -  Last GCSF 1/21/2020. None needed today but ANC trending down.  - Persistent thrombocytopenia: BM 11/6/19 showed nearly 0 platelet precursors. Trial of promacta not helpful. Stopped 12/6/19.       3.  ID: URI sx improved.  FUOs: Extensive ID work up- no etiology identified. Now afebrile since starting Pred.   - 12/13 EBUS bx with ngtd.- No actual tissue obtained.  - 12/9 RVP=neg  - 12/9 CT chest is stable with GGO better. No antibiotics given during admission because do not feel like fevers are infection.  - empiric steroids; pred taper ended 2/20     Prophy: ACV, pentamidine (2/18). Levo/fluc stopped now off pred. Consider resuming if intermittently neutropenic  t cell subset, Absolute ZY3=148  - influenza vaccination given 11/26/19    4. GI:  no complaints. LFTs wnl 2/18    5. CV: HTN, reported as  steroid-induced  - now off lisinopril since off steroids; BP wnl     6.  FEN/Renal:   Cr and lytes WNL     7.  Mood: Continue Paxil.     8. 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 Resolved. Recurrence 12/9- resolved with Triamcinolone TID.     9. FUOs- Some kind of auto immune process DDX:  Sarcoidosis? Underwent EBUS FNA which was negative for granulomas however unlikely you could see this on just fluid aspirate so would not consider   - completed pred taper 2/20/2020 as above    10. Psych:  Mood stable on paroxitine      Final Plan: resume levaquin, fluconazole   plts & gcsf today    RTC 2/27 with Dr. Naveed Pitts PAC  [phone]

## 2020-02-25 NOTE — PROGRESS NOTES
Infusion Nursing Note:  Angel Yanez presents today for platelet transfusion.    Patient seen by provider today: Yes: Mony Pitts   present during visit today: Not Applicable.    Note: Patient arrives for platelet transfusion to keep > 10.  No premedications needed, transfused 2u platelets for a level of 1 today per provider and patient tolerated transfusion without incident.  GCSF given subcutaneous right arm.  No further replacement needs today.    Intravenous Access:  Peripheral IV placed.    Treatment Conditions:  Lab Results   Component Value Date    HGB 8.4 02/25/2020     Lab Results   Component Value Date    WBC 1.8 02/25/2020      Lab Results   Component Value Date    ANEU 0.8 02/25/2020     Lab Results   Component Value Date    PLT 1 02/25/2020      Results reviewed, labs MET treatment parameters, ok to proceed with treatment.      Post Infusion Assessment:  Patient tolerated infusion without incident.  Patient tolerated injection without incident.  No evidence of extravasations.  Access discontinued per protocol.       Discharge Plan:   Patient discharged in stable condition accompanied by: self.  Departure Mode: Ambulatory.  Patient aware of follow up appointments.     Isamar Tam RN, BMTCN

## 2020-02-26 LAB
ABO + RH BLD: NORMAL
ABO + RH BLD: NORMAL
BLD GP AB SCN SERPL QL: NORMAL
BLD PROD TYP BPU: NORMAL
BLOOD BANK CMNT PATIENT-IMP: NORMAL
CMV DNA SPEC NAA+PROBE-ACNC: NORMAL [IU]/ML
CMV DNA SPEC NAA+PROBE-LOG#: NORMAL {LOG_IU}/ML
EBV DNA # SPEC NAA+PROBE: NORMAL {COPIES}/ML
EBV DNA SPEC NAA+PROBE-LOG#: NORMAL {LOG_COPIES}/ML
NUM BPU REQUESTED: 2
SPECIMEN EXP DATE BLD: NORMAL
SPECIMEN SOURCE: NORMAL

## 2020-02-27 ENCOUNTER — INFUSION THERAPY VISIT (OUTPATIENT)
Dept: TRANSPLANT | Facility: CLINIC | Age: 60
End: 2020-02-27
Attending: INTERNAL MEDICINE
Payer: COMMERCIAL

## 2020-02-27 VITALS
RESPIRATION RATE: 16 BRPM | TEMPERATURE: 97.8 F | OXYGEN SATURATION: 100 % | DIASTOLIC BLOOD PRESSURE: 83 MMHG | BODY MASS INDEX: 25.18 KG/M2 | SYSTOLIC BLOOD PRESSURE: 155 MMHG | HEART RATE: 85 BPM | WEIGHT: 175.5 LBS

## 2020-02-27 VITALS
OXYGEN SATURATION: 100 % | RESPIRATION RATE: 16 BRPM | DIASTOLIC BLOOD PRESSURE: 81 MMHG | SYSTOLIC BLOOD PRESSURE: 124 MMHG | TEMPERATURE: 98.6 F | HEART RATE: 85 BPM

## 2020-02-27 DIAGNOSIS — C90.00 MULTIPLE MYELOMA NOT HAVING ACHIEVED REMISSION (H): Primary | ICD-10-CM

## 2020-02-27 DIAGNOSIS — L40.50 PSORIASIS WITH ARTHROPATHY (H): ICD-10-CM

## 2020-02-27 DIAGNOSIS — Z94.81 STATUS POST BONE MARROW TRANSPLANT (H): Primary | ICD-10-CM

## 2020-02-27 DIAGNOSIS — D69.6 THROMBOCYTOPENIA (H): ICD-10-CM

## 2020-02-27 LAB
ACANTHOCYTES BLD QL SMEAR: SLIGHT
ANION GAP SERPL CALCULATED.3IONS-SCNC: 6 MMOL/L (ref 3–14)
ANISOCYTOSIS BLD QL SMEAR: ABNORMAL
BASOPHILS # BLD AUTO: 0.1 10E9/L (ref 0–0.2)
BASOPHILS NFR BLD AUTO: 1.7 %
BLD PROD TYP BPU: NORMAL
BLD UNIT ID BPU: 0
BLOOD PRODUCT CODE: NORMAL
BPU ID: NORMAL
BUN SERPL-MCNC: 15 MG/DL (ref 7–30)
CALCIUM SERPL-MCNC: 9.1 MG/DL (ref 8.5–10.1)
CHLORIDE SERPL-SCNC: 108 MMOL/L (ref 94–109)
CO2 SERPL-SCNC: 25 MMOL/L (ref 20–32)
CREAT SERPL-MCNC: 0.85 MG/DL (ref 0.66–1.25)
DACRYOCYTES BLD QL SMEAR: SLIGHT
DIFFERENTIAL METHOD BLD: ABNORMAL
EOSINOPHIL # BLD AUTO: 0.1 10E9/L (ref 0–0.7)
EOSINOPHIL NFR BLD AUTO: 2.6 %
ERYTHROCYTE [DISTWIDTH] IN BLOOD BY AUTOMATED COUNT: 22.7 % (ref 10–15)
GFR SERPL CREATININE-BSD FRML MDRD: >90 ML/MIN/{1.73_M2}
GLUCOSE SERPL-MCNC: 125 MG/DL (ref 70–99)
HCT VFR BLD AUTO: 24 % (ref 40–53)
HGB BLD-MCNC: 7.9 G/DL (ref 13.3–17.7)
LYMPHOCYTES # BLD AUTO: 0.8 10E9/L (ref 0.8–5.3)
LYMPHOCYTES NFR BLD AUTO: 15.5 %
MCH RBC QN AUTO: 36.7 PG (ref 26.5–33)
MCHC RBC AUTO-ENTMCNC: 32.9 G/DL (ref 31.5–36.5)
MCV RBC AUTO: 112 FL (ref 78–100)
METAMYELOCYTES # BLD: 0.1 10E9/L
METAMYELOCYTES NFR BLD MANUAL: 1.7 %
MONOCYTES # BLD AUTO: 0.4 10E9/L (ref 0–1.3)
MONOCYTES NFR BLD AUTO: 6.9 %
NEUTROPHILS # BLD AUTO: 3.7 10E9/L (ref 1.6–8.3)
NEUTROPHILS NFR BLD AUTO: 71.6 %
OVALOCYTES BLD QL SMEAR: SLIGHT
PLATELET # BLD AUTO: 3 10E9/L (ref 150–450)
PLATELET # BLD EST: ABNORMAL 10*3/UL
POIKILOCYTOSIS BLD QL SMEAR: SLIGHT
POLYCHROMASIA BLD QL SMEAR: SLIGHT
POTASSIUM SERPL-SCNC: 4.2 MMOL/L (ref 3.4–5.3)
RBC # BLD AUTO: 2.15 10E12/L (ref 4.4–5.9)
SODIUM SERPL-SCNC: 140 MMOL/L (ref 133–144)
TRANSFUSION STATUS PATIENT QL: NORMAL
WBC # BLD AUTO: 5.1 10E9/L (ref 4–11)

## 2020-02-27 PROCEDURE — 86900 BLOOD TYPING SEROLOGIC ABO: CPT | Performed by: INTERNAL MEDICINE

## 2020-02-27 PROCEDURE — 86850 RBC ANTIBODY SCREEN: CPT | Performed by: INTERNAL MEDICINE

## 2020-02-27 PROCEDURE — 85025 COMPLETE CBC W/AUTO DIFF WBC: CPT | Performed by: STUDENT IN AN ORGANIZED HEALTH CARE EDUCATION/TRAINING PROGRAM

## 2020-02-27 PROCEDURE — G0463 HOSPITAL OUTPT CLINIC VISIT: HCPCS | Mod: 25

## 2020-02-27 PROCEDURE — 36430 TRANSFUSION BLD/BLD COMPNT: CPT

## 2020-02-27 PROCEDURE — 80048 BASIC METABOLIC PNL TOTAL CA: CPT | Performed by: STUDENT IN AN ORGANIZED HEALTH CARE EDUCATION/TRAINING PROGRAM

## 2020-02-27 PROCEDURE — 86923 COMPATIBILITY TEST ELECTRIC: CPT | Performed by: INTERNAL MEDICINE

## 2020-02-27 PROCEDURE — P9037 PLATE PHERES LEUKOREDU IRRAD: HCPCS | Performed by: PHYSICIAN ASSISTANT

## 2020-02-27 PROCEDURE — 86901 BLOOD TYPING SEROLOGIC RH(D): CPT | Performed by: INTERNAL MEDICINE

## 2020-02-27 RX ORDER — PREDNISONE 10 MG/1
TABLET ORAL
Qty: 150 TABLET | Refills: 0 | Status: CANCELLED | OUTPATIENT
Start: 2020-02-27

## 2020-02-27 ASSESSMENT — PAIN SCALES - GENERAL: PAINLEVEL: NO PAIN (0)

## 2020-02-27 NOTE — NURSING NOTE
"Oncology Rooming Note    February 27, 2020 1:10 PM   Angel Yanez is a 59 year old male who presents for:    Chief Complaint   Patient presents with     Blood Draw     Labs drawn via /PIV placed by RN in lab. VS taken.     RECHECK     Return: Multipole Myeloma      Initial Vitals: BP (!) 155/83 (BP Location: Right arm, Patient Position: Sitting, Cuff Size: Adult Regular)   Pulse 85   Temp 97.8  F (36.6  C) (Oral)   Resp 16   Wt 79.6 kg (175 lb 8 oz)   SpO2 100%   BMI 25.18 kg/m   Estimated body mass index is 25.18 kg/m  as calculated from the following:    Height as of 2/4/20: 1.778 m (5' 10\").    Weight as of this encounter: 79.6 kg (175 lb 8 oz). Body surface area is 1.98 meters squared.  No Pain (0) Comment: Data Unavailable   No LMP for male patient.  Allergies reviewed: Yes  Medications reviewed: Yes    Medications: Medication refills not needed today.  Pharmacy name entered into EPIC:    [facility] - [place], [place] - [address]  [facility] - [place], [place] - [address]    Clinical concerns: None       Kathy Perez CMA              "

## 2020-02-27 NOTE — LETTER
2/27/2020       RE: Angel Yanez  [address]  [place] [facility] [address]     Dear Colleague,    Thank you for referring your patient, Angel Yanez, to the [facility]LOOD [facility] MARROW [facility]ANS[facility] at [facility]. Please see a copy of my visit note below.    [facility]  BMT Clinic  Outpatient Progress Note  Last clinic visit: 2/25/20    BMT:     Angel Yanez is a 60 yo man D+286 s/p 2nd autologous transplant for MM.   S/p 2 readmissions for FUO (10/16-10/23; 9/23-9/29/19). Readmitted on 12/9/2019 with return of fevers despite celebrex, FARZANA, worsening sob and return of rash. Now with ongoing cytopenias.    Interval history:     Mr. Yanez presents today unaccompanied. He reports overall stability.    He continues to endorse poor energy vs deconditioning, but is no longer napping. He continues to work 6-7 hours per day without issue, and is trying to exercise more by walking. He is able to walk at least 1.5 miles without issue.    He has not had any bleeding nor bruising issues until today he had some mild epistaxis.    Comprehensive ROS otherwise negative.    On ROS in addition to the above  Endorses:  +Mouth sores for the past week - not painful, not doing anything for it. Knows how to make salt and soda rinses but hasn't yet    Denies  fevers, chills, NS, HA, dysphagia/odynophagia, SOB, CP, n/v, abd pain, constipation/diarrhea, hematochezia, dysuria, hematuria, swelling, rashes, lymphadenopathy      Past Medical History:   Reviewed in EPIC    Past Medical History:   Diagnosis Date     GERD (gastroesophageal reflux disease)      H/O autologous stem cell transplant (H) 02/2005     Hyperlipidemia      Multiple myeloma (H) 2004     PONV (postoperative nausea and vomiting)      Psoriasis      Psoriatic arthritis (H)       Past Surgical History:   Reviewed in EPIC    Past Surgical History:   Procedure Laterality Date     ARTHROPLASTY HIP       BIOPSY LYMPH NODE AXILLA  N/A 11/15/2019    Procedure: Right axillary lymph node biopsy;  Surgeon: Reese Irving MD;  Location: [facility] OR     COLONOSCOPY       ENDOBRONCHIAL ULTRASOUND FLEXIBLE N/A 12/16/2019    Procedure: flexible bronchoscopy, endobronchial ultrasound;  Surgeon: Kyaw Escudero MD;  Location: [facility] OR     HERNIA REPAIR       IR CVC TUNNEL PLACEMENT > 5 YRS OF AGE  1/22/2019     IR CVC TUNNEL PLACEMENT > 5 YRS OF AGE  5/16/2019     IR CVC TUNNEL REMOVAL LEFT  2/20/2019     IR CVC TUNNEL REMOVAL RIGHT  7/23/2019     IR FOLLOW UP VISIT OUTPATIENT  1/24/2019     IR LYMPH NODE BIOPSY  10/18/2019     ORTHOPEDIC SURGERY       PROCURE BONE MARROW N/A 5/14/2019    Procedure: Bone Marrow Secor;  Surgeon: Antwan Erickson MD;  Location: [facility] OR     TRANSPLANT        Social History:   Reviewed in EPIC    Social History     Tobacco Use     Smoking status: Former Smoker     Smokeless tobacco: Never Used   Substance Use Topics     Alcohol use: Yes     Comment: occasionaly      Family History:   Reviewed in EPIC    Family History   Problem Relation Age of Onset     Diabetes Mother      Cerebrovascular Disease Mother      Leukemia Father      Medications:   Reviewed in [facility]      Current Outpatient Medications   Medication Sig     acetaminophen (TYLENOL) 325 MG tablet Take 1-2 tablets (325-650 mg) by mouth every 4 hours as needed for fever     acyclovir (ZOVIRAX) 800 MG tablet Take 1 tablet (800 mg) by mouth 2 times daily     calcium carbonate (CALCIUM CARBONATE) 600 MG tablet Take 2 tablets by mouth daily      Cholecalciferol (VITAMIN D3) 2000 units CAPS Take 2,000 Units by mouth daily      fluconazole (DIFLUCAN) 100 MG tablet Take 1 tablet (100 mg) by mouth daily     levofloxacin (LEVAQUIN) 250 MG tablet Take 1 tablet (250 mg) by mouth daily     PARoxetine (PAXIL) 20 MG tablet Take 1 tablet (20 mg) by mouth every morning     pentamidine (NEBUPENT) 300 MG neb solution Inhale 300 mg into the lungs every 28 days Will get in BMT  clinic. Done 12-17-dmo     No current facility-administered medications for this visit.       Physical Exam (Resident / Clinician):   Blood pressure (!) 155/83, pulse 85, temperature 97.8  F (36.6  C), temperature source Oral, resp. rate 16, weight 79.6 kg (175 lb 8 oz), SpO2 100 %.    ECOG PS: 1  Constitutional: WDWN male in NAD, pleasant and appropriate  HEENT:  NC/AT, no icterus, red shallow ulceration on posterior hard palate as well as more superficial ulcerations on b/l internal buccal surface, MMM  Skin: No jaundice nor ecchymoses  Lungs: CTAB, no w/r/r, nonlabored breathing  Cardiovascular: RRR, S1, S2, no m/r/g  GI/Abdomen: +BS, soft, nontender, nondistended, no organomegaly nor masses  Back: no tenderness over spinous processes, iliac crests, scapulae  MSK/Extremities: Warm, well perfused. No edema  LN: no cervical, supraclavicular, nor axillary lymphadenopathy  Neurologic: alert, answering questions appropriately, moving all extremities spontaneously  Psych: appropriate affect  Data:   Reviewed in EPIC and notable for:    Results for SAMEER KNIGHT (MRN [id number]) as of 2/27/2020 13:39   Ref. Range 2/27/2020 12:55   Sodium Latest Ref Range: 133 - 144 mmol/L 140   Potassium Latest Ref Range: 3.4 - 5.3 mmol/L 4.2   Chloride Latest Ref Range: 94 - 109 mmol/L 108   Carbon Dioxide Latest Ref Range: 20 - 32 mmol/L 25   Urea Nitrogen Latest Ref Range: 7 - 30 mg/dL 15   Creatinine Latest Ref Range: 0.66 - 1.25 mg/dL 0.85   GFR Estimate Latest Ref Range: >60 mL/min/1.73_m2 >90   GFR Estimate If Black Latest Ref Range: >60 mL/min/1.73_m2 >90   Calcium Latest Ref Range: 8.5 - 10.1 mg/dL 9.1   Anion Gap Latest Ref Range: 3 - 14 mmol/L 6   Glucose Latest Ref Range: 70 - 99 mg/dL 125 (H)   WBC Latest Ref Range: 4.0 - 11.0 10e9/L 5.1   Hemoglobin Latest Ref Range: 13.3 - 17.7 g/dL 7.9 (L)   Hematocrit Latest Ref Range: 40.0 - 53.0 % 24.0 (L)   Platelet Count Latest Ref Range: 150 - 450 10e9/L 3 (LL)   RBC Count Latest  Ref Range: 4.4 - 5.9 10e12/L 2.15 (L)   MCV Latest Ref Range: 78 - 100 fl 112 (H)   MCH Latest Ref Range: 26.5 - 33.0 pg 36.7 (H)   MCHC Latest Ref Range: 31.5 - 36.5 g/dL 32.9   RDW Latest Ref Range: 10.0 - 15.0 % 22.7 (H)     Results for SAMEER KNIGHT (MRN [id number]) as of 2/27/2020 13:39   Ref. Range 2/27/2020 12:55   Absolute Neutrophil Latest Ref Range: 1.6 - 8.3 10e9/L 3.7   Absolute Lymphocytes Latest Ref Range: 0.8 - 5.3 10e9/L 0.8   Absolute Monocytes Latest Ref Range: 0.0 - 1.3 10e9/L 0.4   Absolute Eosinophils Latest Ref Range: 0.0 - 0.7 10e9/L 0.1   Absolute Basophils Latest Ref Range: 0.0 - 0.2 10e9/L 0.1   Absolute Metamyelocytes Latest Ref Range: 0 10e9/L 0.1 (H)         Assessment and Plan:     Angel Knight is a 58 yo man D+286 s/p 2nd autologous transplant for MM with ongoing cytopenias.     1.  BMT/MM:   Slow engraftment; cell dose was only 0.639x10^6. ([facility]arrow [place] - known poor cell dose as failed chemo-mobilization 1/2019.)   - day +180 bone marrow biopsy 11/6/19 which showed no morphologic or immunophenotypic evidence of plasma cell neoplasm. His light chains were not elevated and he had no monoclonal protein in the serum.  He is in clinical remission.  - PET in 8/2019 clear.   - PB FLOW 11/23: No overt aberrant immunophenotype on T cells.  Rare to absent mature B cells and plasma cells.   - repeat BMBx due to persistent cytopenias     2.  HEME: Keep Hgb>8g/dL, plt >10k. plts today.  -  Last GCSF 2/25/2020.  - Persistent thrombocytopenia: BM 11/6/19 showed nearly 0 platelet precursors. Trial of promacta not helpful. Stopped 12/6/19. Progressively worsening in the past few weeks, appears to temporally relate to taper/cessation of prednisone. Did have a post-transfusion assessment showing response from 2->33 on 2/18/20   - Plan for repeat BMBx  - Trial of prednisone burst 60mg qday x2 weeks followed by taper depending on response  - prior auth for romiplostim     3.  ID: GUADALUPE ramirez  improved.  FUOs: Extensive ID work up- no etiology identified. Now afebrile since treated with Pred 30mg qday followed by ~1 month taper.   - 12/13 EBUS bx with ngtd.- No actual tissue obtained.  - 12/9 RVP=neg  - 12/9 CT chest is stable with GGO better. No antibiotics given during admission because do not feel like fevers are infection.  - empiric steroids; pred taper ended 2/20      Prophy: ACV, pentamidine (2/18). Levo/fluc stopped now off pred. Had resumed 2/18/20 due to persistent neutropenia but responsive to GCSF with normal ANC today  t cell subset, Absolute SU0=223 assessed 12/10/19  - influenza vaccination given 11/26/19     4. GI:  no complaints. LFTs wnl 2/18     5. CV: HTN, reported as steroid-induced  - now off lisinopril since off steroids; BP  slightly elevated today, will continue to monitor and resume if needed     6.  FEN/Renal:   Cr and lytes WNL     7.  Mood: Continue Paxil.     8. Derm:   - Hx of rash and skin bx 11/15 consistent with chemotheapy-induced Levan's disease. Resolved. Recurrence 12/9- resolved with Triamcinolone TID.      9. FUOs- Some kind of auto immune process DDX:  Sarcoidosis? Underwent EBUS FNA which was negative for granulomas however unlikely you could see this on just fluid aspirate so would not consider   - completed pred taper 2/20/2020 as above     10. Psych:  Mood stable on paroxitine     Final Plan: plts today, schedule BMBx, prior auth for romiplostim, start prednisone burst at 60mg qday    Labs and treatment plan reviewed with patient. All questions answered.    In this 30 minutes visit, > 50% spent in counseling and coordinating care.    Patient discussed with Dr. Lucio.    Stan Manning MD ([place]), PhD   Hem/Onc Fellow    Patient has been seen and evaluated by me. I have reviewed today's vital signs, medications, labs and imaging results. I have discussed the plan with the team and agree with the findings and plan in this note.    Ongoing poor graft function  with cytopenias and transfusion needs.  Responding well to GCSF and tolerating anemia quite well also.    Rarely having nosebleeds but seems to have lower plts since pred stopped.    As noted above; we'll try 3 interventions.  1.  Pred 60 mg/d x 2 weeks and then reassess  2.  Romiplostim after the pred  3.  BM biopsy to evaluate cellularity and seek any evidence to support other interventions.    Questions answered in full.  Adebayo Lucio MD

## 2020-02-27 NOTE — LETTER
2/27/2020      RE: Angel Yanez  [address]  [place] [address]       [facility]  BMT Clinic  Outpatient Progress Note  Last clinic visit: 2/25/20    BMT:     Angel Yanez is a 58 yo man D+286 s/p 2nd autologous transplant for MM.   S/p 2 readmissions for FUO (10/16-10/23; 9/23-9/29/19). Readmitted on 12/9/2019 with return of fevers despite celebrex, FARZANA, worsening sob and return of rash. Now with ongoing cytopenias.    Interval history:     Mr. Yanez presents today unaccompanied. He reports overall stability.    He continues to endorse poor energy vs deconditioning, but is no longer napping. He continues to work 6-7 hours per day without issue, and is trying to exercise more by walking. He is able to walk at least 1.5 miles without issue.    He has not had any bleeding nor bruising issues until today he had some mild epistaxis.    Comprehensive ROS otherwise negative.    On ROS in addition to the above  Endorses:  +Mouth sores for the past week - not painful, not doing anything for it. Knows how to make salt and soda rinses but hasn't yet    Denies  fevers, chills, NS, HA, dysphagia/odynophagia, SOB, CP, n/v, abd pain, constipation/diarrhea, hematochezia, dysuria, hematuria, swelling, rashes, lymphadenopathy      Past Medical History:   Reviewed in EPIC    Past Medical History:   Diagnosis Date     GERD (gastroesophageal reflux disease)      H/O autologous stem cell transplant (H) 02/2005     Hyperlipidemia      Multiple myeloma (H) 2004     PONV (postoperative nausea and vomiting)      Psoriasis      Psoriatic arthritis (H)       Past Surgical History:   Reviewed in EPIC    Past Surgical History:   Procedure Laterality Date     ARTHROPLASTY HIP       BIOPSY LYMPH NODE AXILLA N/A 11/15/2019    Procedure: Right axillary lymph node biopsy;  Surgeon: Reese Irving MD;  Location: [facility]     COLONOSCOPY       ENDOBRONCHIAL ULTRASOUND FLEXIBLE N/A 12/16/2019    Procedure: flexible  bronchoscopy, endobronchial ultrasound;  Surgeon: Kyaw Escudero MD;  Location: [facility] OR     HERNIA REPAIR       IR CVC TUNNEL PLACEMENT > 5 YRS OF AGE  1/22/2019     IR CVC TUNNEL PLACEMENT > 5 YRS OF AGE  5/16/2019     IR CVC TUNNEL REMOVAL LEFT  2/20/2019     IR CVC TUNNEL REMOVAL RIGHT  7/23/2019     IR FOLLOW UP VISIT OUTPATIENT  1/24/2019     IR LYMPH NODE BIOPSY  10/18/2019     ORTHOPEDIC SURGERY       PROCURE BONE MARROW N/A 5/14/2019    Procedure: Bone Marrow [place];  Surgeon: Antwan Erickson MD;  Location: [facility] OR     TRANSPLANT        Social History:   Reviewed in EPIC    Social History     Tobacco Use     Smoking status: Former Smoker     Smokeless tobacco: Never Used   Substance Use Topics     Alcohol use: Yes     Comment: occasionaly      Family History:   Reviewed in EPIC    Family History   Problem Relation Age of Onset     Diabetes Mother      Cerebrovascular Disease Mother      Leukemia Father      Medications:   Reviewed in [facility]      Current Outpatient Medications   Medication Sig     acetaminophen (TYLENOL) 325 MG tablet Take 1-2 tablets (325-650 mg) by mouth every 4 hours as needed for fever     acyclovir (ZOVIRAX) 800 MG tablet Take 1 tablet (800 mg) by mouth 2 times daily     calcium carbonate (CALCIUM CARBONATE) 600 MG tablet Take 2 tablets by mouth daily      Cholecalciferol (VITAMIN D3) 2000 units CAPS Take 2,000 Units by mouth daily      fluconazole (DIFLUCAN) 100 MG tablet Take 1 tablet (100 mg) by mouth daily     levofloxacin (LEVAQUIN) 250 MG tablet Take 1 tablet (250 mg) by mouth daily     PARoxetine (PAXIL) 20 MG tablet Take 1 tablet (20 mg) by mouth every morning     pentamidine (NEBUPENT) 300 MG neb solution Inhale 300 mg into the lungs every 28 days Will get in BMT clinic. Done 12-17-dmo     No current facility-administered medications for this visit.       Physical Exam (Resident / Clinician):   Blood pressure (!) 155/83, pulse 85, temperature 97.8  F (36.6  C),  temperature source Oral, resp. rate 16, weight 79.6 kg (175 lb 8 oz), SpO2 100 %.    ECOG PS: 1  Constitutional: WDWN male in NAD, pleasant and appropriate  HEENT:  NC/AT, no icterus, red shallow ulceration on posterior hard palate as well as more superficial ulcerations on b/l internal buccal surface, MMM  Skin: No jaundice nor ecchymoses  Lungs: CTAB, no w/r/r, nonlabored breathing  Cardiovascular: RRR, S1, S2, no m/r/g  GI/Abdomen: +BS, soft, nontender, nondistended, no organomegaly nor masses  Back: no tenderness over spinous processes, iliac crests, scapulae  MSK/Extremities: Warm, well perfused. No edema  LN: no cervical, supraclavicular, nor axillary lymphadenopathy  Neurologic: alert, answering questions appropriately, moving all extremities spontaneously  Psych: appropriate affect  Data:   Reviewed in EPIC and notable for:    Results for SAMEER KNIGHT (MRN [id number]) as of 2/27/2020 13:39   Ref. Range 2/27/2020 12:55   Sodium Latest Ref Range: 133 - 144 mmol/L 140   Potassium Latest Ref Range: 3.4 - 5.3 mmol/L 4.2   Chloride Latest Ref Range: 94 - 109 mmol/L 108   Carbon Dioxide Latest Ref Range: 20 - 32 mmol/L 25   Urea Nitrogen Latest Ref Range: 7 - 30 mg/dL 15   Creatinine Latest Ref Range: 0.66 - 1.25 mg/dL 0.85   GFR Estimate Latest Ref Range: >60 mL/min/1.73_m2 >90   GFR Estimate If Black Latest Ref Range: >60 mL/min/1.73_m2 >90   Calcium Latest Ref Range: 8.5 - 10.1 mg/dL 9.1   Anion Gap Latest Ref Range: 3 - 14 mmol/L 6   Glucose Latest Ref Range: 70 - 99 mg/dL 125 (H)   WBC Latest Ref Range: 4.0 - 11.0 10e9/L 5.1   Hemoglobin Latest Ref Range: 13.3 - 17.7 g/dL 7.9 (L)   Hematocrit Latest Ref Range: 40.0 - 53.0 % 24.0 (L)   Platelet Count Latest Ref Range: 150 - 450 10e9/L 3 (LL)   RBC Count Latest Ref Range: 4.4 - 5.9 10e12/L 2.15 (L)   MCV Latest Ref Range: 78 - 100 fl 112 (H)   MCH Latest Ref Range: 26.5 - 33.0 pg 36.7 (H)   MCHC Latest Ref Range: 31.5 - 36.5 g/dL 32.9   RDW Latest Ref Range:  10.0 - 15.0 % 22.7 (H)     Results for SAMEER KNIGHT (MRN [id number]) as of 2/27/2020 13:39   Ref. Range 2/27/2020 12:55   Absolute Neutrophil Latest Ref Range: 1.6 - 8.3 10e9/L 3.7   Absolute Lymphocytes Latest Ref Range: 0.8 - 5.3 10e9/L 0.8   Absolute Monocytes Latest Ref Range: 0.0 - 1.3 10e9/L 0.4   Absolute Eosinophils Latest Ref Range: 0.0 - 0.7 10e9/L 0.1   Absolute Basophils Latest Ref Range: 0.0 - 0.2 10e9/L 0.1   Absolute Metamyelocytes Latest Ref Range: 0 10e9/L 0.1 (H)         Assessment and Plan:     Angel Knight is a 58 yo man D+286 s/p 2nd autologous transplant for MM with ongoing cytopenias.     1.  BMT/MM:   Slow engraftment; cell dose was only 0.639x10^6. ([facility] [place] - known poor cell dose as failed chemo-mobilization 1/2019.)   - day +180 bone marrow biopsy 11/6/19 which showed no morphologic or immunophenotypic evidence of plasma cell neoplasm. His light chains were not elevated and he had no monoclonal protein in the serum.  He is in clinical remission.  - PET in 8/2019 clear.   - PB FLOW 11/23: No overt aberrant immunophenotype on T cells.  Rare to absent mature B cells and plasma cells.   - repeat BMBx due to persistent cytopenias     2.  HEME: Keep Hgb>8g/dL, plt >10k. plts today.  -  Last GCSF 2/25/2020.  - Persistent thrombocytopenia: BM 11/6/19 showed nearly 0 platelet precursors. Trial of promacta not helpful. Stopped 12/6/19. Progressively worsening in the past few weeks, appears to temporally relate to taper/cessation of prednisone. Did have a post-transfusion assessment showing response from 2->33 on 2/18/20   - Plan for repeat BMBx  - Trial of prednisone burst 60mg qday x2 weeks followed by taper depending on response  - prior auth for romiplostim     3.  ID: URI sx improved.  FUOs: Extensive ID work up- no etiology identified. Now afebrile since treated with Pred 30mg qday followed by ~1 month taper.   - 12/13 EBUS bx with ngtd.- No actual tissue obtained.  - 12/9 RVP=neg  -  12/9 CT chest is stable with GGO better. No antibiotics given during admission because do not feel like fevers are infection.  - empiric steroids; pred taper ended 2/20      Prophy: ACV, pentamidine (2/18). Levo/fluc stopped now off pred. Had resumed 2/18/20 due to persistent neutropenia but responsive to GCSF with normal ANC today  t cell subset, Absolute UA6=914 assessed 12/10/19  - influenza vaccination given 11/26/19     4. GI:  no complaints. LFTs wnl 2/18     5. CV: HTN, reported as steroid-induced  - now off lisinopril since off steroids; BP  slightly elevated today, will continue to monitor and resume if needed     6.  FEN/Renal:   Cr and lytes WNL     7.  Mood: Continue Paxil.     8. 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 Resolved. Recurrence 12/9- resolved with Triamcinolone TID.      9. FUOs- Some kind of auto immune process DDX:  Sarcoidosis? Underwent EBUS FNA which was negative for granulomas however unlikely you could see this on just fluid aspirate so would not consider   - completed pred taper 2/20/2020 as above     10. Psych:  Mood stable on paroxitine     Final Plan: plts today, schedule BMBx, prior auth for romiplostim, start prednisone burst at 60mg qday    Labs and treatment plan reviewed with patient. All questions answered.    In this 30 minutes visit, > 50% spent in counseling and coordinating care.    Patient discussed with Dr. Lucio.    Stan Manning MD ([place]), PhD   Hem/Onc Fellow    Patient has been seen and evaluated by me. I have reviewed today's vital signs, medications, labs and imaging results. I have discussed the plan with the team and agree with the findings and plan in this note.    Ongoing poor graft function with cytopenias and transfusion needs.  Responding well to GCSF and tolerating anemia quite well also.    Rarely having nosebleeds but seems to have lower plts since pred stopped.    As noted above; we'll try 3  interventions.  1.  Pred 60 mg/d x 2 weeks and then reassess  2.  Romiplostim after the pred  3.  BM biopsy to evaluate cellularity and seek any evidence to support other interventions.    Questions answered in full.  Adebayo Lucio MD          BMT DOM

## 2020-02-27 NOTE — PROGRESS NOTES
[facility]  BMT Clinic  Outpatient Progress Note  Last clinic visit: 2/25/20    BMT:     Angel Yanez is a 58 yo man D+286 s/p 2nd autologous transplant for MM.   S/p 2 readmissions for FUO (10/16-10/23; 9/23-9/29/19). Readmitted on 12/9/2019 with return of fevers despite celebrex, FARZANA, worsening sob and return of rash. Now with ongoing cytopenias.    Interval history:     Mr. Yanez presents today unaccompanied. He reports overall stability.    He continues to endorse poor energy vs deconditioning, but is no longer napping. He continues to work 6-7 hours per day without issue, and is trying to exercise more by walking. He is able to walk at least 1.5 miles without issue.    He has not had any bleeding nor bruising issues until today he had some mild epistaxis.    Comprehensive ROS otherwise negative.    On ROS in addition to the above  Endorses:  +Mouth sores for the past week - not painful, not doing anything for it. Knows how to make salt and soda rinses but hasn't yet    Denies  fevers, chills, NS, HA, dysphagia/odynophagia, SOB, CP, n/v, abd pain, constipation/diarrhea, hematochezia, dysuria, hematuria, swelling, rashes, lymphadenopathy      Past Medical History:   Reviewed in EPIC    Past Medical History:   Diagnosis Date     GERD (gastroesophageal reflux disease)      H/O autologous stem cell transplant (H) 02/2005     Hyperlipidemia      Multiple myeloma (H) 2004     PONV (postoperative nausea and vomiting)      Psoriasis      Psoriatic arthritis (H)       Past Surgical History:   Reviewed in EPIC    Past Surgical History:   Procedure Laterality Date     ARTHROPLASTY HIP       BIOPSY LYMPH NODE AXILLA N/A 11/15/2019    Procedure: Right axillary lymph node biopsy;  Surgeon: Reese Irving MD;  Location: [facility] OR     COLONOSCOPY       ENDOBRONCHIAL ULTRASOUND FLEXIBLE N/A 12/16/2019    Procedure: flexible bronchoscopy, endobronchial ultrasound;  Surgeon: Kyaw Escudero MD;  Location: [facility] OR      HERNIA REPAIR       IR CVC TUNNEL PLACEMENT > 5 YRS OF AGE  1/22/2019     IR CVC TUNNEL PLACEMENT > 5 YRS OF AGE  5/16/2019     IR CVC TUNNEL REMOVAL LEFT  2/20/2019     IR CVC TUNNEL REMOVAL RIGHT  7/23/2019     IR FOLLOW UP VISIT OUTPATIENT  1/24/2019     IR LYMPH NODE BIOPSY  10/18/2019     ORTHOPEDIC SURGERY       PROCURE BONE MARROW N/A 5/14/2019    Procedure: Bone Marrow [place];  Surgeon: Antwan Erickson MD;  Location: [facility] OR     TRANSPLANT        Social History:   Reviewed in EPIC    Social History     Tobacco Use     Smoking status: Former Smoker     Smokeless tobacco: Never Used   Substance Use Topics     Alcohol use: Yes     Comment: occasionaly      Family History:   Reviewed in EPIC    Family History   Problem Relation Age of Onset     Diabetes Mother      Cerebrovascular Disease Mother      Leukemia Father      Medications:   Reviewed in [facility]      Current Outpatient Medications   Medication Sig     acetaminophen (TYLENOL) 325 MG tablet Take 1-2 tablets (325-650 mg) by mouth every 4 hours as needed for fever     acyclovir (ZOVIRAX) 800 MG tablet Take 1 tablet (800 mg) by mouth 2 times daily     calcium carbonate (CALCIUM CARBONATE) 600 MG tablet Take 2 tablets by mouth daily      Cholecalciferol (VITAMIN D3) 2000 units CAPS Take 2,000 Units by mouth daily      fluconazole (DIFLUCAN) 100 MG tablet Take 1 tablet (100 mg) by mouth daily     levofloxacin (LEVAQUIN) 250 MG tablet Take 1 tablet (250 mg) by mouth daily     PARoxetine (PAXIL) 20 MG tablet Take 1 tablet (20 mg) by mouth every morning     pentamidine (NEBUPENT) 300 MG neb solution Inhale 300 mg into the lungs every 28 days Will get in BMT clinic. Done 12-17-dmo     No current facility-administered medications for this visit.       Physical Exam (Resident / Clinician):   Blood pressure (!) 155/83, pulse 85, temperature 97.8  F (36.6  C), temperature source Oral, resp. rate 16, weight 79.6 kg (175 lb 8 oz), SpO2 100 %.    ECOG PS:  1  Constitutional: WDWN male in NAD, pleasant and appropriate  HEENT:  NC/AT, no icterus, red shallow ulceration on posterior hard palate as well as more superficial ulcerations on b/l internal buccal surface, MMM  Skin: No jaundice nor ecchymoses  Lungs: CTAB, no w/r/r, nonlabored breathing  Cardiovascular: RRR, S1, S2, no m/r/g  GI/Abdomen: +BS, soft, nontender, nondistended, no organomegaly nor masses  Back: no tenderness over spinous processes, iliac crests, scapulae  MSK/Extremities: Warm, well perfused. No edema  LN: no cervical, supraclavicular, nor axillary lymphadenopathy  Neurologic: alert, answering questions appropriately, moving all extremities spontaneously  Psych: appropriate affect  Data:   Reviewed in EPIC and notable for:    Results for SAMEER KNIGHT (MRN [id number]) as of 2/27/2020 13:39   Ref. Range 2/27/2020 12:55   Sodium Latest Ref Range: 133 - 144 mmol/L 140   Potassium Latest Ref Range: 3.4 - 5.3 mmol/L 4.2   Chloride Latest Ref Range: 94 - 109 mmol/L 108   Carbon Dioxide Latest Ref Range: 20 - 32 mmol/L 25   Urea Nitrogen Latest Ref Range: 7 - 30 mg/dL 15   Creatinine Latest Ref Range: 0.66 - 1.25 mg/dL 0.85   GFR Estimate Latest Ref Range: >60 mL/min/1.73_m2 >90   GFR Estimate If Black Latest Ref Range: >60 mL/min/1.73_m2 >90   Calcium Latest Ref Range: 8.5 - 10.1 mg/dL 9.1   Anion Gap Latest Ref Range: 3 - 14 mmol/L 6   Glucose Latest Ref Range: 70 - 99 mg/dL 125 (H)   WBC Latest Ref Range: 4.0 - 11.0 10e9/L 5.1   Hemoglobin Latest Ref Range: 13.3 - 17.7 g/dL 7.9 (L)   Hematocrit Latest Ref Range: 40.0 - 53.0 % 24.0 (L)   Platelet Count Latest Ref Range: 150 - 450 10e9/L 3 (LL)   RBC Count Latest Ref Range: 4.4 - 5.9 10e12/L 2.15 (L)   MCV Latest Ref Range: 78 - 100 fl 112 (H)   MCH Latest Ref Range: 26.5 - 33.0 pg 36.7 (H)   MCHC Latest Ref Range: 31.5 - 36.5 g/dL 32.9   RDW Latest Ref Range: 10.0 - 15.0 % 22.7 (H)     Results for SAMEER KNIGHT (MRN [id number]) as of 2/27/2020 13:39    Ref. Range 2/27/2020 12:55   Absolute Neutrophil Latest Ref Range: 1.6 - 8.3 10e9/L 3.7   Absolute Lymphocytes Latest Ref Range: 0.8 - 5.3 10e9/L 0.8   Absolute Monocytes Latest Ref Range: 0.0 - 1.3 10e9/L 0.4   Absolute Eosinophils Latest Ref Range: 0.0 - 0.7 10e9/L 0.1   Absolute Basophils Latest Ref Range: 0.0 - 0.2 10e9/L 0.1   Absolute Metamyelocytes Latest Ref Range: 0 10e9/L 0.1 (H)         Assessment and Plan:     Angel Yanez is a 58 yo man D+286 s/p 2nd autologous transplant for MM with ongoing cytopenias.     1.  BMT/MM:   Slow engraftment; cell dose was only 0.639x10^6. ([facility] [place] - known poor cell dose as failed chemo-mobilization 1/2019.)   - day +180 bone marrow biopsy 11/6/19 which showed no morphologic or immunophenotypic evidence of plasma cell neoplasm. His light chains were not elevated and he had no monoclonal protein in the serum.  He is in clinical remission.  - PET in 8/2019 clear.   - PB FLOW 11/23: No overt aberrant immunophenotype on T cells.  Rare to absent mature B cells and plasma cells.   - repeat BMBx due to persistent cytopenias     2.  HEME: Keep Hgb>8g/dL, plt >10k. plts today.  -  Last GCSF 2/25/2020.  - Persistent thrombocytopenia: BM 11/6/19 showed nearly 0 platelet precursors. Trial of promacta not helpful. Stopped 12/6/19. Progressively worsening in the past few weeks, appears to temporally relate to taper/cessation of prednisone. Did have a post-transfusion assessment showing response from 2->33 on 2/18/20   - Plan for repeat BMBx  - Trial of prednisone burst 60mg qday x2 weeks followed by taper depending on response  - prior auth for romiplostim     3.  ID: URI sx improved.  FUOs: Extensive ID work up- no etiology identified. Now afebrile since treated with Pred 30mg qday followed by ~1 month taper.   - 12/13 EBUS bx with ngtd.- No actual tissue obtained.  - 12/9 RVP=neg  - 12/9 CT chest is stable with GGO better. No antibiotics given during admission because do not  feel like fevers are infection.  - empiric steroids; pred taper ended 2/20      Prophy: ACV, pentamidine (2/18). Levo/fluc stopped now off pred. Had resumed 2/18/20 due to persistent neutropenia but responsive to GCSF with normal ANC today  t cell subset, Absolute WD9=133 assessed 12/10/19  - influenza vaccination given 11/26/19     4. GI:  no complaints. LFTs wnl 2/18     5. CV: HTN, reported as steroid-induced  - now off lisinopril since off steroids; BP slightly elevated today, will continue to monitor and resume if needed     6.  FEN/Renal:   Cr and lytes WNL     7.  Mood: Continue Paxil.     8. 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 Resolved. Recurrence 12/9- resolved with Triamcinolone TID.      9. FUOs- Some kind of auto immune process DDX:  Sarcoidosis? Underwent EBUS FNA which was negative for granulomas however unlikely you could see this on just fluid aspirate so would not consider   - completed pred taper 2/20/2020 as above     10. Psych:  Mood stable on paroxitine     Final Plan: plts today, schedule BMBx, prior auth for romiplostim, start prednisone burst at 60mg qday    Labs and treatment plan reviewed with patient. All questions answered.    In this 30 minutes visit, > 50% spent in counseling and coordinating care.    Patient discussed with Dr. Lucio.    Stan Manning MD ([place]), PhD   Hem/Onc Fellow    Patient has been seen and evaluated by me. I have reviewed today's vital signs, medications, labs and imaging results. I have discussed the plan with the team and agree with the findings and plan in this note.    Ongoing poor graft function with cytopenias and transfusion needs.  Responding well to GCSF and tolerating anemia quite well also.    Rarely having nosebleeds but seems to have lower plts since pred stopped.    As noted above; we'll try 3 interventions.  1.  Pred 60 mg/d x 2 weeks and then reassess  2.  Romiplostim after the pred  3.   BM biopsy to evaluate cellularity and seek any evidence to support other interventions.    Questions answered in full.  Adebayo Lucio MD

## 2020-02-27 NOTE — NURSING NOTE
Chief Complaint   Patient presents with     Infusion     pt here for rbcs and platelet transfusions s/p bmt for MM

## 2020-02-28 NOTE — PROGRESS NOTES
Infusion Nursing Note:  Angel Yanez presents today for platelets.    Patient seen by provider today: Yes: МАРИНА   present during visit today: Not Applicable.    Note: Patient received platelets with no premeds.  Patient has a new cough and runny nose.  Patient has no complaints of pain.    Intravenous Access:  Peripheral IV placed.    Treatment Conditions:  Lab Results   Component Value Date    HGB 9.6 12/13/2019     Lab Results   Component Value Date    WBC 3.6 12/13/2019      Lab Results   Component Value Date    ANEU 2.2 12/13/2019     Lab Results   Component Value Date    PLT 9 12/13/2019      Lab Results   Component Value Date     12/13/2019                   Lab Results   Component Value Date    POTASSIUM 3.4 12/13/2019           Lab Results   Component Value Date    MAG 2.1 11/26/2019            Lab Results   Component Value Date    CR 0.79 12/13/2019                   Lab Results   Component Value Date    ZHEN 8.8 12/13/2019                Lab Results   Component Value Date    BILITOTAL 1.5 12/13/2019           Lab Results   Component Value Date    ALBUMIN 2.3 12/13/2019                    Lab Results   Component Value Date    ALT 21 12/13/2019           Lab Results   Component Value Date    AST 5 12/13/2019       Results reviewed, labs MET treatment parameters, ok to proceed with treatment.      Post Infusion Assessment:  Patient tolerated infusion without incident.  Blood return noted pre and post infusion.  Site patent and intact, free from redness, edema or discomfort.  No evidence of extravasations.  Access discontinued per protocol.       Discharge Plan:   Discharge instructions reviewed with: Patient.  Patient and/or family verbalized understanding of discharge instructions and all questions answered.    Marry Astorga RN                         Statement Selected

## 2020-02-29 ENCOUNTER — INFUSION THERAPY VISIT (OUTPATIENT)
Dept: TRANSPLANT | Facility: CLINIC | Age: 60
End: 2020-02-29
Attending: INTERNAL MEDICINE
Payer: COMMERCIAL

## 2020-02-29 ENCOUNTER — APPOINTMENT (OUTPATIENT)
Dept: LAB | Facility: CLINIC | Age: 60
End: 2020-02-29
Attending: INTERNAL MEDICINE
Payer: COMMERCIAL

## 2020-02-29 VITALS
DIASTOLIC BLOOD PRESSURE: 81 MMHG | WEIGHT: 158.73 LBS | BODY MASS INDEX: 22.78 KG/M2 | RESPIRATION RATE: 18 BRPM | SYSTOLIC BLOOD PRESSURE: 126 MMHG | HEART RATE: 68 BPM | TEMPERATURE: 98.4 F | OXYGEN SATURATION: 100 %

## 2020-02-29 DIAGNOSIS — L40.50 PSORIASIS WITH ARTHROPATHY (H): Primary | ICD-10-CM

## 2020-02-29 DIAGNOSIS — D69.6 THROMBOCYTOPENIA (H): ICD-10-CM

## 2020-02-29 DIAGNOSIS — C90.00 MULTIPLE MYELOMA NOT HAVING ACHIEVED REMISSION (H): ICD-10-CM

## 2020-02-29 LAB
ANION GAP SERPL CALCULATED.3IONS-SCNC: 6 MMOL/L (ref 3–14)
BASOPHILS # BLD AUTO: 0 10E9/L (ref 0–0.2)
BASOPHILS NFR BLD AUTO: 0 %
BLD PROD TYP BPU: NORMAL
BLD UNIT ID BPU: 0
BLOOD PRODUCT CODE: NORMAL
BPU ID: NORMAL
BUN SERPL-MCNC: 16 MG/DL (ref 7–30)
CALCIUM SERPL-MCNC: 9 MG/DL (ref 8.5–10.1)
CHLORIDE SERPL-SCNC: 112 MMOL/L (ref 94–109)
CO2 SERPL-SCNC: 25 MMOL/L (ref 20–32)
CREAT SERPL-MCNC: 0.91 MG/DL (ref 0.66–1.25)
DIFFERENTIAL METHOD BLD: ABNORMAL
EOSINOPHIL # BLD AUTO: 0.1 10E9/L (ref 0–0.7)
EOSINOPHIL NFR BLD AUTO: 1.9 %
ERYTHROCYTE [DISTWIDTH] IN BLOOD BY AUTOMATED COUNT: 22.1 % (ref 10–15)
GFR SERPL CREATININE-BSD FRML MDRD: >90 ML/MIN/{1.73_M2}
GLUCOSE SERPL-MCNC: 91 MG/DL (ref 70–99)
HCT VFR BLD AUTO: 23.9 % (ref 40–53)
HGB BLD-MCNC: 7.9 G/DL (ref 13.3–17.7)
IMM GRANULOCYTES # BLD: 0 10E9/L (ref 0–0.4)
IMM GRANULOCYTES NFR BLD: 0 %
LYMPHOCYTES # BLD AUTO: 0.6 10E9/L (ref 0.8–5.3)
LYMPHOCYTES NFR BLD AUTO: 24.1 %
MCH RBC QN AUTO: 36.7 PG (ref 26.5–33)
MCHC RBC AUTO-ENTMCNC: 33.1 G/DL (ref 31.5–36.5)
MCV RBC AUTO: 111 FL (ref 78–100)
MONOCYTES # BLD AUTO: 0.3 10E9/L (ref 0–1.3)
MONOCYTES NFR BLD AUTO: 10 %
NEUTROPHILS # BLD AUTO: 1.7 10E9/L (ref 1.6–8.3)
NEUTROPHILS NFR BLD AUTO: 64 %
NRBC # BLD AUTO: 0 10*3/UL
NRBC BLD AUTO-RTO: 0 /100
PLATELET # BLD AUTO: 9 10E9/L (ref 150–450)
POTASSIUM SERPL-SCNC: 3.6 MMOL/L (ref 3.4–5.3)
RBC # BLD AUTO: 2.15 10E12/L (ref 4.4–5.9)
SODIUM SERPL-SCNC: 143 MMOL/L (ref 133–144)
TRANSFUSION STATUS PATIENT QL: NORMAL
WBC # BLD AUTO: 2.6 10E9/L (ref 4–11)

## 2020-02-29 PROCEDURE — 86900 BLOOD TYPING SEROLOGIC ABO: CPT | Performed by: INTERNAL MEDICINE

## 2020-02-29 PROCEDURE — 86850 RBC ANTIBODY SCREEN: CPT | Performed by: INTERNAL MEDICINE

## 2020-02-29 PROCEDURE — P9037 PLATE PHERES LEUKOREDU IRRAD: HCPCS | Performed by: PHYSICIAN ASSISTANT

## 2020-02-29 PROCEDURE — 36430 TRANSFUSION BLD/BLD COMPNT: CPT

## 2020-02-29 PROCEDURE — 86901 BLOOD TYPING SEROLOGIC RH(D): CPT | Performed by: INTERNAL MEDICINE

## 2020-02-29 PROCEDURE — 86923 COMPATIBILITY TEST ELECTRIC: CPT | Performed by: INTERNAL MEDICINE

## 2020-02-29 PROCEDURE — G0463 HOSPITAL OUTPT CLINIC VISIT: HCPCS | Mod: 25

## 2020-02-29 PROCEDURE — 80048 BASIC METABOLIC PNL TOTAL CA: CPT | Performed by: INTERNAL MEDICINE

## 2020-02-29 PROCEDURE — 85025 COMPLETE CBC W/AUTO DIFF WBC: CPT | Performed by: INTERNAL MEDICINE

## 2020-02-29 RX ORDER — PREDNISONE 20 MG/1
60 TABLET ORAL DAILY
Qty: 100 TABLET | Refills: 1 | Status: SHIPPED | OUTPATIENT
Start: 2020-02-29 | End: 2020-03-12

## 2020-02-29 RX ORDER — PREDNISONE 20 MG/1
60 TABLET ORAL DAILY
COMMUNITY
End: 2020-03-12

## 2020-02-29 ASSESSMENT — PAIN SCALES - GENERAL: PAINLEVEL: NO PAIN (0)

## 2020-02-29 NOTE — NURSING NOTE
"Oncology Rooming Note    February 29, 2020 10:34 AM   Angel Yanez is a 59 year old male who presents for:    Chief Complaint   Patient presents with     Blood Draw     labs drawn via  by RN in lab. VS taken.      RECHECK     MM here for provider visit     Initial Vitals: BP (P) 121/78   Pulse (P) 74   Temp (P) 98.6  F (37  C) (Oral)   Resp (P) 18   Wt 72 kg (158 lb 11.7 oz)   SpO2 (P) 100%   BMI 22.78 kg/m   Estimated body mass index is 22.78 kg/m  as calculated from the following:    Height as of 2/4/20: 1.778 m (5' 10\").    Weight as of this encounter: 72 kg (158 lb 11.7 oz). Body surface area is 1.89 meters squared.  No Pain (0) Comment: Data Unavailable   No LMP for male patient.  Allergies reviewed: Yes  Medications reviewed: Yes    Medications: MEDICATION REFILLS NEEDED TODAY. Provider was notified.  Pharmacy name entered into EPIC:    [facility] - [place] - [address]  [facility] - [place], [place] [address]    Clinical concerns: None      Tr Jj RN              "

## 2020-02-29 NOTE — PROGRESS NOTES
Infusion Nursing Note:  Angelravinder Yanez presents today for plt transfusion.    Patient seen by provider today: Yes: Dr. Lucio   present during visit today: Not Applicable.    Note: Labs monitored, VSS. Pt count of 9k today. Hgb remained the same 7.9 today. 1unit plts transfused. Pt has currently started prednisone to try and help blood count.  Will continue to monitor counts.     Intravenous Access:  Peripheral IV placed.    Treatment Conditions:  Results reviewed, labs MET treatment parameters, ok to proceed with treatment.      Post Infusion Assessment:  Patient tolerated infusion without incident.  Site patent and intact, free from redness, edema or discomfort.  No evidence of extravasations.  Access discontinued per protocol.       Discharge Plan:   Patient discharged in stable condition accompanied by: self.  Departure Mode: Ambulatory.    Tr Jj RN

## 2020-02-29 NOTE — PROGRESS NOTES
[facility]  BMT Clinic  Outpatient Progress Note  2.29.20    BMT:     Angel Yanez is a 58 yo man D+286 s/p 2nd autologous transplant for MM.   S/p 2 readmissions for FUO (10/16-10/23; 9/23-9/29/19). Readmitted on 12/9/2019 with return of fevers despite celebrex, FARZANA, worsening sob and return of rash. Now with ongoing cytopenias.    Interval history:     No bleeding;  Energy ok; no rash or new areas of bone pain.  No Resp, card or new GI sx.    Denies  fevers, chills, NS, HA, dysphagia/odynophagia, SOB, CP, n/v, abd pain, constipation/diarrhea, hematochezia, dysuria, hematuria, swelling, rashes, lymphadenopathy      Past Medical History:   Reviewed in [facility]     Social History:   Reviewed in EPIC     Physical Exam (Resident / Clinician):   Blood pressure (P) 121/78, pulse (P) 74, temperature (P) 98.6  F (37  C), temperature source (P) Oral, resp. rate (P) 18, weight 72 kg (158 lb 11.7 oz), SpO2 (P) 100 %.   Wt Readings from Last 4 Encounters:   02/29/20 72 kg (158 lb 11.7 oz)   02/27/20 79.6 kg (175 lb 8 oz)   02/25/20 78.9 kg (174 lb)   02/21/20 79.1 kg (174 lb 4.8 oz)      in NAD, pleasant and appropriate  HEENT:  NC/AT, no new oral lesions  Skin: No jaundice nor ecchymoses  petech on lower legs; none in conjuctiva  Lungs: CTAB, no w/r/r, nonlabored breathing  Cardiovascular: RRR, S1, S2, no m/r/g  GI/Abdomen: +BS, soft, nontender, nondistended, no organomegaly nor masses  MSK/Extremities: Warm, well perfused. No edema no focal bone tenderness  LN: no cervical, supraclavicular, nor axillary lymphadenopathy  Neurologic: alert, and conversant  Data:       Assessment and Plan:     Angel Yanez is a 58 yo man D+286 s/p 2nd autologous transplant for MM with ongoing cytopenias.     1.  BMT/MM:   Slow engraftment; cell dose was only 0.639x10^6. ([facility] [place] - known poor cell dose as failed chemo-mobilization 1/2019.)   - day +180 bone marrow biopsy 11/6/19 which showed no morphologic or immunophenotypic  evidence of plasma cell neoplasm. His light chains were not elevated and he had no monoclonal protein in the serum.  He is in clinical remission.  - PET in 8/2019 clear.   - PB FLOW 11/23: No overt aberrant immunophenotype on T cells.  Rare to absent mature B cells and plasma cells.   - repeat BMBx due to persistent cytopenias     2.  HEME: Keep Hgb>8g/dL, plt >10k. plts today.  -  Last GCSF 2/25/2020.  - Persistent thrombocytopenia: BM 11/6/19 showed nearly 0 platelet precursors. Trial of promacta not helpful. Stopped 12/6/19. Progressively worsening in the past few weeks, appears to temporally relate to taper/cessation of prednisone. Did have a post-transfusion assessment showing response from 2->33 on 2/18/20   - Plan for repeat BMBx  - Trial of prednisone burst 60mg qday x2 weeks started 2/27    followed by taper depending on response  - prior auth for romiplostim     3.  ID: URI sx improved.  FUOs: Extensive ID work up- no etiology identified. Now afebrile since treated with Pred 30mg qday followed by ~1 month taper.   - 12/13 EBUS bx with ngtd.- No actual tissue obtained.  - 12/9 RVP=neg  - 12/9 CT chest is stable with GGO better. No antibiotics given during admission because do not feel like fevers are infection.  - empiric steroids; pred taper ended 2/20      Prophy: ACV, pentamidine (2/18). Levo/fluc stopped now off pred. Had resumed 2/18/20 due to persistent neutropenia but responsive to GCSF with normal ANC today  t cell subset, Absolute [id number]=678 assessed 12/10/19  - influenza vaccination given 11/26/19     4. GI:  no complaints. LFTs wnl 2/18     5. CV: HTN, reported as steroid-induced  - now off lisinopril since off steroids; BP slightly elevated today, will continue to monitor and resume if needed     6.  FEN/Renal:   Cr and lytes WNL     7.  Mood: Continue Paxil.     8. 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 Resolved. Recurrence 12/9- resolved with  Triamcinolone TID.      9. FUOs- Some kind of auto immune process DDX:  Sarcoidosis? Underwent EBUS FNA which was negative for granulomas however unlikely you could see this on just fluid aspirate so would not consider   - completed pred taper 2/20/2020 as above     10. Psych:  Mood stable on paroxitine     Final Plan: As noted above; we'll try 3 interventions.  1.  Pred 60 mg/d x 2 weeks and then reassess  2.  Romiplostim after the pred  3.  BM biopsy to evaluate cellularity and seek any evidence to support other interventions.    Questions answered in full.  Adebayo Lucio MD

## 2020-02-29 NOTE — NURSING NOTE
Chief Complaint   Patient presents with     Blood Draw     labs drawn via  by RN in lab. VS taken.      María Vidal RN

## 2020-03-03 ENCOUNTER — APPOINTMENT (OUTPATIENT)
Dept: LAB | Facility: CLINIC | Age: 60
End: 2020-03-03
Attending: PHYSICIAN ASSISTANT
Payer: COMMERCIAL

## 2020-03-03 ENCOUNTER — ONCOLOGY VISIT (OUTPATIENT)
Dept: TRANSPLANT | Facility: CLINIC | Age: 60
End: 2020-03-03
Attending: PHYSICIAN ASSISTANT
Payer: COMMERCIAL

## 2020-03-03 VITALS
SYSTOLIC BLOOD PRESSURE: 159 MMHG | TEMPERATURE: 98.5 F | HEART RATE: 74 BPM | DIASTOLIC BLOOD PRESSURE: 89 MMHG | RESPIRATION RATE: 16 BRPM | OXYGEN SATURATION: 99 %

## 2020-03-03 VITALS
BODY MASS INDEX: 24.82 KG/M2 | DIASTOLIC BLOOD PRESSURE: 95 MMHG | WEIGHT: 173 LBS | OXYGEN SATURATION: 100 % | RESPIRATION RATE: 16 BRPM | SYSTOLIC BLOOD PRESSURE: 174 MMHG | TEMPERATURE: 97.4 F | HEART RATE: 70 BPM

## 2020-03-03 DIAGNOSIS — C90.01 MULTIPLE MYELOMA IN REMISSION (H): ICD-10-CM

## 2020-03-03 DIAGNOSIS — L40.50 PSORIASIS WITH ARTHROPATHY (H): Primary | ICD-10-CM

## 2020-03-03 DIAGNOSIS — I15.9 SECONDARY HYPERTENSION: ICD-10-CM

## 2020-03-03 DIAGNOSIS — C90.00 MULTIPLE MYELOMA NOT HAVING ACHIEVED REMISSION (H): ICD-10-CM

## 2020-03-03 DIAGNOSIS — D69.6 THROMBOCYTOPENIA (H): ICD-10-CM

## 2020-03-03 DIAGNOSIS — Z94.81 STATUS POST BONE MARROW TRANSPLANT (H): ICD-10-CM

## 2020-03-03 LAB
ALBUMIN SERPL-MCNC: 3.8 G/DL (ref 3.4–5)
ALP SERPL-CCNC: 71 U/L (ref 40–150)
ALT SERPL W P-5'-P-CCNC: 18 U/L (ref 0–70)
ANION GAP SERPL CALCULATED.3IONS-SCNC: 7 MMOL/L (ref 3–14)
AST SERPL W P-5'-P-CCNC: 17 U/L (ref 0–45)
BASOPHILS # BLD AUTO: 0 10E9/L (ref 0–0.2)
BASOPHILS NFR BLD AUTO: 0.6 %
BILIRUB SERPL-MCNC: 0.6 MG/DL (ref 0.2–1.3)
BLD PROD TYP BPU: NORMAL
BLD UNIT ID BPU: 0
BLOOD PRODUCT CODE: NORMAL
BPU ID: NORMAL
BUN SERPL-MCNC: 16 MG/DL (ref 7–30)
CALCIUM SERPL-MCNC: 9.2 MG/DL (ref 8.5–10.1)
CHLORIDE SERPL-SCNC: 110 MMOL/L (ref 94–109)
CO2 SERPL-SCNC: 26 MMOL/L (ref 20–32)
CREAT SERPL-MCNC: 0.92 MG/DL (ref 0.66–1.25)
DIFFERENTIAL METHOD BLD: ABNORMAL
EOSINOPHIL # BLD AUTO: 0 10E9/L (ref 0–0.7)
EOSINOPHIL NFR BLD AUTO: 1.1 %
ERYTHROCYTE [DISTWIDTH] IN BLOOD BY AUTOMATED COUNT: 20.9 % (ref 10–15)
GFR SERPL CREATININE-BSD FRML MDRD: 89 ML/MIN/{1.73_M2}
GLUCOSE SERPL-MCNC: 85 MG/DL (ref 70–99)
HCT VFR BLD AUTO: 22.7 % (ref 40–53)
HGB BLD-MCNC: 7.4 G/DL (ref 13.3–17.7)
IMM GRANULOCYTES # BLD: 0 10E9/L (ref 0–0.4)
IMM GRANULOCYTES NFR BLD: 1.1 %
LYMPHOCYTES # BLD AUTO: 0.6 10E9/L (ref 0.8–5.3)
LYMPHOCYTES NFR BLD AUTO: 33.7 %
MAGNESIUM SERPL-MCNC: 2 MG/DL (ref 1.6–2.3)
MCH RBC QN AUTO: 36.3 PG (ref 26.5–33)
MCHC RBC AUTO-ENTMCNC: 32.6 G/DL (ref 31.5–36.5)
MCV RBC AUTO: 111 FL (ref 78–100)
MONOCYTES # BLD AUTO: 0.3 10E9/L (ref 0–1.3)
MONOCYTES NFR BLD AUTO: 13.8 %
NEUTROPHILS # BLD AUTO: 0.9 10E9/L (ref 1.6–8.3)
NEUTROPHILS NFR BLD AUTO: 49.7 %
NRBC # BLD AUTO: 0 10*3/UL
NRBC BLD AUTO-RTO: 0 /100
NUM BPU REQUESTED: 1
PLATELET # BLD AUTO: 4 10E9/L (ref 150–450)
PLATELET # BLD EST: ABNORMAL 10*3/UL
POTASSIUM SERPL-SCNC: 3.4 MMOL/L (ref 3.4–5.3)
PROT SERPL-MCNC: 7.1 G/DL (ref 6.8–8.8)
RBC # BLD AUTO: 2.04 10E12/L (ref 4.4–5.9)
SODIUM SERPL-SCNC: 143 MMOL/L (ref 133–144)
TRANSFUSION STATUS PATIENT QL: NORMAL
WBC # BLD AUTO: 1.8 10E9/L (ref 4–11)

## 2020-03-03 PROCEDURE — 83735 ASSAY OF MAGNESIUM: CPT | Performed by: INTERNAL MEDICINE

## 2020-03-03 PROCEDURE — 86900 BLOOD TYPING SEROLOGIC ABO: CPT | Performed by: PHYSICIAN ASSISTANT

## 2020-03-03 PROCEDURE — 86850 RBC ANTIBODY SCREEN: CPT | Performed by: PHYSICIAN ASSISTANT

## 2020-03-03 PROCEDURE — 25000128 H RX IP 250 OP 636: Mod: ZF | Performed by: STUDENT IN AN ORGANIZED HEALTH CARE EDUCATION/TRAINING PROGRAM

## 2020-03-03 PROCEDURE — G0463 HOSPITAL OUTPT CLINIC VISIT: HCPCS | Mod: ZF

## 2020-03-03 PROCEDURE — P9037 PLATE PHERES LEUKOREDU IRRAD: HCPCS | Performed by: PHYSICIAN ASSISTANT

## 2020-03-03 PROCEDURE — 36430 TRANSFUSION BLD/BLD COMPNT: CPT

## 2020-03-03 PROCEDURE — P9040 RBC LEUKOREDUCED IRRADIATED: HCPCS | Performed by: INTERNAL MEDICINE

## 2020-03-03 PROCEDURE — 86923 COMPATIBILITY TEST ELECTRIC: CPT | Performed by: PHYSICIAN ASSISTANT

## 2020-03-03 PROCEDURE — 80053 COMPREHEN METABOLIC PANEL: CPT | Performed by: INTERNAL MEDICINE

## 2020-03-03 PROCEDURE — 86901 BLOOD TYPING SEROLOGIC RH(D): CPT | Performed by: PHYSICIAN ASSISTANT

## 2020-03-03 PROCEDURE — 85025 COMPLETE CBC W/AUTO DIFF WBC: CPT | Performed by: INTERNAL MEDICINE

## 2020-03-03 PROCEDURE — 96372 THER/PROPH/DIAG INJ SC/IM: CPT

## 2020-03-03 PROCEDURE — 40000556 ZZH STATISTIC PERIPHERAL IV START W US GUIDANCE: Mod: ZF

## 2020-03-03 RX ORDER — HEPARIN SODIUM,PORCINE 10 UNIT/ML
5 VIAL (ML) INTRAVENOUS
Status: CANCELLED | OUTPATIENT
Start: 2020-03-18

## 2020-03-03 RX ORDER — ACYCLOVIR 800 MG/1
800 TABLET ORAL 2 TIMES DAILY
Qty: 60 TABLET | Refills: 1 | Status: SHIPPED | OUTPATIENT
Start: 2020-03-03 | End: 2020-04-03

## 2020-03-03 RX ORDER — PENTAMIDINE ISETHIONATE 300 MG/300MG
300 INHALANT RESPIRATORY (INHALATION)
Status: CANCELLED
Start: 2020-03-18

## 2020-03-03 RX ORDER — LISINOPRIL 10 MG/1
5 TABLET ORAL DAILY
Qty: 30 TABLET | Refills: 0 | Status: SHIPPED | OUTPATIENT
Start: 2020-03-03 | End: 2020-03-06

## 2020-03-03 RX ORDER — ALBUTEROL SULFATE 5 MG/ML
2.5 SOLUTION RESPIRATORY (INHALATION)
Status: CANCELLED
Start: 2020-03-18

## 2020-03-03 RX ADMIN — FILGRASTIM 480 MCG: 480 INJECTION, SOLUTION INTRAVENOUS; SUBCUTANEOUS at 11:26

## 2020-03-03 ASSESSMENT — PAIN SCALES - GENERAL: PAINLEVEL: NO PAIN (0)

## 2020-03-03 NOTE — NURSING NOTE
"Oncology Rooming Note    March 3, 2020 8:06 AM   Angel Yanez is a 59 year old male who presents for:    Chief Complaint   Patient presents with     RECHECK     provider visit, possible transfusion s/p bmt for multiple myeloma     Initial Vitals: There were no vitals taken for this visit. Estimated body mass index is 24.82 kg/m  as calculated from the following:    Height as of 2/4/20: 1.778 m (5' 10\").    Weight as of an earlier encounter on 3/3/20: 78.5 kg (173 lb). There is no height or weight on file to calculate BSA.  Data Unavailable Comment: Data Unavailable   No LMP for male patient.  Allergies reviewed: Yes  Medications reviewed: Yes    Medications: MEDICATION REFILLS NEEDED TODAY. Provider was notified.  Levaquin, Fluconazole  Pharmacy name entered into EPIC:    [facility] [place] - [address] PHARMACY [facility] - [place], [place] - [address] [facility]    Clinical concerns:  Patient denies any fevers/N/V/D.  He denies any pain/discomfort.        CLARA ELLSWORTH RN              "

## 2020-03-03 NOTE — PROGRESS NOTES
Infusion Nursing Note:  Angel Yanez presents today for add-on transfusion, G-CSF.    Patient seen by provider today: Yes: Fei Meng   present during visit today: Not Applicable.    Note: Labs were monitored.    Intravenous Access:  Peripheral IV placed.    Treatment Conditions:  Patient received an add-on two units of platelets for a platelet count of 4.  He received one unit of red blood cells for a Hgg of 7.4.  Patient received an add-on G-CSF injection in his lower abdomen for an ANC 0f 0.9..      Post Infusion Assessment:  Patient tolerated transfusions and injection without incident.       Discharge Plan:   Patient discharged in stable condition accompanied by: self.    CLARA ELLSWORTH RN

## 2020-03-03 NOTE — PROGRESS NOTES
BMT Clinic Progress Note    Patient ID: Angel Yanez is a 60 yo man D+291 s/p 2nd autologous transplant for MM.   S/p 2 readmissions for FUO (10/16-10/23; 9/23-9/29/19). Readmitted on 12/9/2019 with return of fevers despite celebrex, FARZANA, worsening sob and return of rash. Now with ongoing cytopenias.    Interval history:     Returns for follow up. No new medical complaints. Is a little disappointed in his counts today. Energy is good and still getting out for walks. No bleeding. Appetite and oral intake stable. No difficulty sleeping with steroids. No recurrence of fevers, chills, or infectious symptoms. No rashes.     ROS: 7 point ROS neg unless specified above.       Physical Exam:   There were no vitals taken for this visit. - Reviewed in flowsheets, /80 otherwise WNL.   Wt Readings from Last 4 Encounters:   03/03/20 78.5 kg (173 lb)   02/29/20 72 kg (158 lb 11.7 oz)   02/27/20 79.6 kg (175 lb 8 oz)   02/25/20 78.9 kg (174 lb)   General: NAD, engaged and talkative.   ENT: oral mucosa moist without ulceration, small area of petechiae on L buccal mucosa. No OP erythema or exudate  Lungs: CTAB, no w/r/r, nonlabored breathing  Cardiovascular: RRR, S1, S2, no m/r/g  GI/Abdomen: +BS, soft, nontender, non-distended  Skin: No jaundice nor ecchymoses  petech on lower legs; none in conjuctiva  MSK/Extremities: Warm, well perfused.  Neurologic: alert, and conversant    Data:     Lab Results   Component Value Date    WBC 1.8 (L) 03/03/2020    ANEU 0.9 (L) 03/03/2020    HGB 7.4 (L) 03/03/2020    HCT 22.7 (L) 03/03/2020    PLT 4 (LL) 03/03/2020     03/03/2020    POTASSIUM 3.4 03/03/2020    CHLORIDE 110 (H) 03/03/2020    CO2 26 03/03/2020    GLC 85 03/03/2020    BUN 16 03/03/2020    CR 0.92 03/03/2020    MAG 2.0 03/03/2020    INR 1.53 (H) 12/09/2019    BILITOTAL 0.6 03/03/2020    AST 17 03/03/2020    ALT 18 03/03/2020    ALKPHOS 71 03/03/2020    PROTTOTAL 7.1 03/03/2020    ALBUMIN 3.8 03/03/2020       I have  assessed all abnormal lab values for their clinical significance and any values considered clinically significant have been addressed in the assessment and plan.      Assessment and Plan:     Agnel Yanez is a 60 yo man D+291 s/p 2nd autologous transplant for MM with ongoing cytopenias.     1.  BMT/MM:   Slow engraftment; cell dose was only 0.639x10^6. ([facility] [place] - known poor cell dose as failed chemo-mobilization 1/2019.)   - day +180 bone marrow biopsy 11/6/19 which showed no morphologic or immunophenotypic evidence of plasma cell neoplasm. His light chains were not elevated and he had no monoclonal protein in the serum.  He is in clinical remission.  - PET in 8/2019 clear.   - PB FLOW 11/23: No overt aberrant immunophenotype on T cells.  Rare to absent mature B cells and plasma cells.   - scheduling repeat BMBx due to persistent cytopenias- date pending.      2.  HEME: Keep Hgb>8g/dL, plt >10k. RBCs and plts today. Recommended 2u of plts.   -  Last GCSF 2/25/2020. Give today for ANC of 0.9  - Persistent thrombocytopenia: BM 11/6/19 showed nearly 0 platelet precursors. Trial of promacta not helpful. Stopped 12/6/19. Progressively worsening in the past few weeks, appears to temporally relate to taper/cessation of prednisone. Did have a post-transfusion assessment showing response from 2->33 on 2/18/20   - Plan for repeat BMBx as above  - Trial of prednisone burst 60mg qday x2 weeks started 2/27 followed by taper depending on response (previous taper was 4 weeks)  - prior auth for romiplostim- if approved will start after 2 weeks of pred.      3.  ID:   FUOs: Extensive ID work up- no etiology identified. Now afebrile since treated with Pred 30mg qday followed by ~1 month taper. Steroids now resumed for pancytopenia as above (no recurrence of fevers off steroids).   - 12/13 EBUS bx with ngtd.- No actual tissue obtained.  - 12/9 RVP=neg  - 12/9 CT chest is stable with GGO better. No antibiotics given during  admission because do not feel like fevers are infection.     Prophy: ACV, pentamidine (2/18). Levo/fluc stopped now off pred. Resumed with intermittent neutropenia. Neutropenic and getting gcsf again today. Will continue levo/fluc as he is also on steroids.   t cell subset, Absolute TW9=939 assessed 12/10/19  - influenza vaccination given 11/26/19     4. GI:  no complaints. LFTs wnl 2/18     5. CV: Hx of steroid induced HTN. Currently off lisinopril but now back on steroids; BP slightly elevated today on arrival but went up to 173/90 with blood products. Gave pt option of hydralazine vs. Resuming low dose lisinorpil. Will resume lisinopril at 5mg daily.      6.  FEN/Renal:   Cr and lytes WNL     7.  Mood: Continue Paxil.     8. 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 Resolved. Recurrence 12/9- resolved with Triamcinolone TID.      9. FUOs- Some kind of auto immune process DDX:  Sarcoidosis? Underwent EBUS FNA which was negative for granulomas however unlikely you could see this on just fluid aspirate so would not consider   - completed pred taper 2/20/2020 as above without recurrence of fevers. Now on steroids for pancytopenia.      10. Psych:  Mood stable on paroxitine       Plan: plts, RBCs, gcsf, and schedule bmbx    RTC: 3/6 for labs, provider visit and infusion for possible RBCs/plts.     Fei Meng PA-C  [phone]

## 2020-03-03 NOTE — NURSING NOTE
Chief Complaint   Patient presents with     Blood Draw     vitals and venipuncture done by JORGE LUIS, patient elected to wait for lab results before getting PIV in case he does not need his infusion today        Arti Hui CMA on 3/3/2020 at 7:38 AM

## 2020-03-06 ENCOUNTER — ONCOLOGY VISIT (OUTPATIENT)
Dept: TRANSPLANT | Facility: CLINIC | Age: 60
End: 2020-03-06
Attending: PHYSICIAN ASSISTANT
Payer: COMMERCIAL

## 2020-03-06 VITALS
TEMPERATURE: 97.4 F | DIASTOLIC BLOOD PRESSURE: 86 MMHG | RESPIRATION RATE: 18 BRPM | BODY MASS INDEX: 24.89 KG/M2 | WEIGHT: 173.5 LBS | SYSTOLIC BLOOD PRESSURE: 143 MMHG | HEART RATE: 74 BPM

## 2020-03-06 VITALS
DIASTOLIC BLOOD PRESSURE: 93 MMHG | TEMPERATURE: 97.9 F | SYSTOLIC BLOOD PRESSURE: 169 MMHG | OXYGEN SATURATION: 100 % | HEART RATE: 68 BPM | RESPIRATION RATE: 20 BRPM

## 2020-03-06 DIAGNOSIS — Z94.81 STATUS POST BONE MARROW TRANSPLANT (H): ICD-10-CM

## 2020-03-06 DIAGNOSIS — Z94.81 STATUS POST BONE MARROW TRANSPLANT (H): Primary | ICD-10-CM

## 2020-03-06 DIAGNOSIS — D69.6 THROMBOCYTOPENIA (H): ICD-10-CM

## 2020-03-06 DIAGNOSIS — C90.01 MULTIPLE MYELOMA IN REMISSION (H): ICD-10-CM

## 2020-03-06 DIAGNOSIS — L40.50 PSORIASIS WITH ARTHROPATHY (H): ICD-10-CM

## 2020-03-06 DIAGNOSIS — I15.9 SECONDARY HYPERTENSION: ICD-10-CM

## 2020-03-06 DIAGNOSIS — C90.00 MULTIPLE MYELOMA NOT HAVING ACHIEVED REMISSION (H): Primary | ICD-10-CM

## 2020-03-06 LAB
ABO + RH BLD: NORMAL
ABO + RH BLD: NORMAL
ALBUMIN SERPL-MCNC: 4 G/DL (ref 3.4–5)
ALP SERPL-CCNC: 83 U/L (ref 40–150)
ALT SERPL W P-5'-P-CCNC: 18 U/L (ref 0–70)
ANION GAP SERPL CALCULATED.3IONS-SCNC: 5 MMOL/L (ref 3–14)
AST SERPL W P-5'-P-CCNC: 13 U/L (ref 0–45)
BASOPHILS # BLD AUTO: 0 10E9/L (ref 0–0.2)
BASOPHILS NFR BLD AUTO: 0 %
BILIRUB SERPL-MCNC: 0.5 MG/DL (ref 0.2–1.3)
BLD GP AB SCN SERPL QL: NORMAL
BLD PROD TYP BPU: NORMAL
BLD UNIT ID BPU: 0
BLOOD BANK CMNT PATIENT-IMP: NORMAL
BLOOD PRODUCT CODE: NORMAL
BPU ID: NORMAL
BUN SERPL-MCNC: 16 MG/DL (ref 7–30)
CALCIUM SERPL-MCNC: 9 MG/DL (ref 8.5–10.1)
CHLORIDE SERPL-SCNC: 108 MMOL/L (ref 94–109)
CO2 SERPL-SCNC: 28 MMOL/L (ref 20–32)
COPATH REPORT: NORMAL
CREAT SERPL-MCNC: 0.81 MG/DL (ref 0.66–1.25)
DIFFERENTIAL METHOD BLD: ABNORMAL
EOSINOPHIL # BLD AUTO: 0 10E9/L (ref 0–0.7)
EOSINOPHIL NFR BLD AUTO: 0.2 %
ERYTHROCYTE [DISTWIDTH] IN BLOOD BY AUTOMATED COUNT: 22.8 % (ref 10–15)
GFR SERPL CREATININE-BSD FRML MDRD: >90 ML/MIN/{1.73_M2}
GLUCOSE SERPL-MCNC: 103 MG/DL (ref 70–99)
HCT VFR BLD AUTO: 27.9 % (ref 40–53)
HGB BLD-MCNC: 8.9 G/DL (ref 13.3–17.7)
IMM GRANULOCYTES # BLD: 0.1 10E9/L (ref 0–0.4)
IMM GRANULOCYTES NFR BLD: 2.2 %
LYMPHOCYTES # BLD AUTO: 0.6 10E9/L (ref 0.8–5.3)
LYMPHOCYTES NFR BLD AUTO: 10.6 %
MCH RBC QN AUTO: 36.5 PG (ref 26.5–33)
MCHC RBC AUTO-ENTMCNC: 31.9 G/DL (ref 31.5–36.5)
MCV RBC AUTO: 114 FL (ref 78–100)
MONOCYTES # BLD AUTO: 0.2 10E9/L (ref 0–1.3)
MONOCYTES NFR BLD AUTO: 2.7 %
NEUTROPHILS # BLD AUTO: 5.1 10E9/L (ref 1.6–8.3)
NEUTROPHILS NFR BLD AUTO: 84.3 %
NRBC # BLD AUTO: 0 10*3/UL
NRBC BLD AUTO-RTO: 0 /100
PLATELET # BLD AUTO: 14 10E9/L (ref 150–450)
POTASSIUM SERPL-SCNC: 3.5 MMOL/L (ref 3.4–5.3)
PROT SERPL-MCNC: 7.2 G/DL (ref 6.8–8.8)
RBC # BLD AUTO: 2.44 10E12/L (ref 4.4–5.9)
SODIUM SERPL-SCNC: 140 MMOL/L (ref 133–144)
SPECIMEN EXP DATE BLD: NORMAL
TRANSFUSION STATUS PATIENT QL: NORMAL
WBC # BLD AUTO: 6 10E9/L (ref 4–11)

## 2020-03-06 PROCEDURE — 86900 BLOOD TYPING SEROLOGIC ABO: CPT | Performed by: PHYSICIAN ASSISTANT

## 2020-03-06 PROCEDURE — 88184 FLOWCYTOMETRY/ TC 1 MARKER: CPT | Performed by: INTERNAL MEDICINE

## 2020-03-06 PROCEDURE — 86850 RBC ANTIBODY SCREEN: CPT | Performed by: PHYSICIAN ASSISTANT

## 2020-03-06 PROCEDURE — 88185 FLOWCYTOMETRY/TC ADD-ON: CPT | Performed by: INTERNAL MEDICINE

## 2020-03-06 PROCEDURE — 36430 TRANSFUSION BLD/BLD COMPNT: CPT | Mod: 59

## 2020-03-06 PROCEDURE — 40001005 ZZHCL STATISTIC FLOW >15 ABY TC 88189: Performed by: INTERNAL MEDICINE

## 2020-03-06 PROCEDURE — P9037 PLATE PHERES LEUKOREDU IRRAD: HCPCS | Performed by: PHYSICIAN ASSISTANT

## 2020-03-06 PROCEDURE — 88275 CYTOGENETICS 100-300: CPT | Performed by: STUDENT IN AN ORGANIZED HEALTH CARE EDUCATION/TRAINING PROGRAM

## 2020-03-06 PROCEDURE — 88342 IMHCHEM/IMCYTCHM 1ST ANTB: CPT | Performed by: INTERNAL MEDICINE

## 2020-03-06 PROCEDURE — 88305 TISSUE EXAM BY PATHOLOGIST: CPT | Performed by: INTERNAL MEDICINE

## 2020-03-06 PROCEDURE — 86901 BLOOD TYPING SEROLOGIC RH(D): CPT | Performed by: PHYSICIAN ASSISTANT

## 2020-03-06 PROCEDURE — 88161 CYTOPATH SMEAR OTHER SOURCE: CPT | Performed by: INTERNAL MEDICINE

## 2020-03-06 PROCEDURE — 00000161 ZZHCL STATISTIC H-SPHEME PROCESS B/S: Performed by: INTERNAL MEDICINE

## 2020-03-06 PROCEDURE — 88264 CHROMOSOME ANALYSIS 20-25: CPT | Performed by: STUDENT IN AN ORGANIZED HEALTH CARE EDUCATION/TRAINING PROGRAM

## 2020-03-06 PROCEDURE — 81450 HL NEO GSAP 5-50DNA/DNA&RNA: CPT | Performed by: INTERNAL MEDICINE

## 2020-03-06 PROCEDURE — 40000611 ZZHCL STATISTIC MORPHOLOGY W/INTERP HEMEPATH TC 85060: Performed by: INTERNAL MEDICINE

## 2020-03-06 PROCEDURE — 85025 COMPLETE CBC W/AUTO DIFF WBC: CPT | Performed by: PHYSICIAN ASSISTANT

## 2020-03-06 PROCEDURE — 88341 IMHCHEM/IMCYTCHM EA ADD ANTB: CPT | Performed by: INTERNAL MEDICINE

## 2020-03-06 PROCEDURE — 36416 COLLJ CAPILLARY BLOOD SPEC: CPT | Performed by: INTERNAL MEDICINE

## 2020-03-06 PROCEDURE — 88280 CHROMOSOME KARYOTYPE STUDY: CPT | Performed by: STUDENT IN AN ORGANIZED HEALTH CARE EDUCATION/TRAINING PROGRAM

## 2020-03-06 PROCEDURE — G0463 HOSPITAL OUTPT CLINIC VISIT: HCPCS | Mod: ZF

## 2020-03-06 PROCEDURE — 88237 TISSUE CULTURE BONE MARROW: CPT | Performed by: STUDENT IN AN ORGANIZED HEALTH CARE EDUCATION/TRAINING PROGRAM

## 2020-03-06 PROCEDURE — 40000951 ZZHCL STATISTIC BONE MARROW INTERP TC 85097: Performed by: INTERNAL MEDICINE

## 2020-03-06 PROCEDURE — 88311 DECALCIFY TISSUE: CPT | Performed by: INTERNAL MEDICINE

## 2020-03-06 PROCEDURE — 88271 CYTOGENETICS DNA PROBE: CPT | Performed by: STUDENT IN AN ORGANIZED HEALTH CARE EDUCATION/TRAINING PROGRAM

## 2020-03-06 PROCEDURE — 88313 SPECIAL STAINS GROUP 2: CPT | Performed by: INTERNAL MEDICINE

## 2020-03-06 PROCEDURE — 80053 COMPREHEN METABOLIC PANEL: CPT | Performed by: PHYSICIAN ASSISTANT

## 2020-03-06 PROCEDURE — 38222 DX BONE MARROW BX & ASPIR: CPT | Mod: ZF

## 2020-03-06 RX ORDER — LISINOPRIL 10 MG/1
10 TABLET ORAL DAILY
Qty: 30 TABLET | Refills: 0 | COMMUNITY
Start: 2020-03-06 | End: 2020-03-30

## 2020-03-06 ASSESSMENT — PAIN SCALES - GENERAL: PAINLEVEL: NO PAIN (0)

## 2020-03-06 NOTE — PROGRESS NOTES
BMT ONC Adult Bone Marrow Biopsy Procedure Note  March 6, 2020  BP (!) 143/86   Pulse 74   Temp 97.4  F (36.3  C) (Oral)   Resp 18   Wt 78.7 kg (173 lb 8 oz)   BMI 24.89 kg/m       Learning needs assessment complete within 12 months? YES    DIAGNOSIS: MM s/p BMT with ongoing pancytopenia    PROCEDURE: Unilateral Bone Marrow Biopsy and Unilateral Aspirate    LOCATION: [facility] 2nd Floor    Patient s identification was positively verified by verbal identification and invasive procedure safety checklist was completed. Informed consent was obtained.  No pre-medication was given, patient was placed in the prone position and prepped and draped in a sterile manner. Approximately 10 cc of 1% Lidocaine was used over the left posterior iliac spine. Following this a 3 mm incision was made. Trephine bone marrow core(s) was (were) obtained from the [facility]. Bone marrow aspirates were obtained from the IC. Aspirates were sent for morphology, immunophenotyping, cytogenetics and molecular diagnostics. A total of approximately 20 ml of marrow was aspirated. Following this procedure a sterile dressing was applied to the bone marrow biopsy site(s). The patient was placed in the supine position to maintain pressure on the biopsy site. Post-procedure wound care instructions were given.     Complications: NO    Pre-procedural pain: 0 out of 10 on the numeric pain rating scale.     Procedural pain: 3 out of 10 on the numeric pain rating scale.     Post-procedural pain assessment: 0 out of 10 on the numeric pain rating scale.     Interventions: NO    Length of procedure:20 minutes or less      Procedure performed by: Fei Meng PA-C  [phone]

## 2020-03-06 NOTE — PROGRESS NOTES
Infusion Nursing Note:  Angel Yanez presents today for Plts transplant.    Patient seen by provider today: Yes: Fei Meng   present during visit today: Not Applicable.    Note: Labs monitored, elevated b/p. Plts 14k today. Per provider, transfuse pt above parameters to have weekend off. 1unit plt transfused. Pt tolerated transfusion.   Provider notified of elevated b/ps. Provider instructed patient to double his lisinopril and will recheck next week.   Intravenous Access:  Peripheral IV placed.    Treatment Conditions:  Results reviewed, labs MET treatment parameters, ok to proceed with treatment.      Post Infusion Assessment:  Patient tolerated infusion without incident.  Site patent and intact, free from redness, edema or discomfort.  No evidence of extravasations.  Access discontinued per protocol.       Discharge Plan:   Patient discharged in stable condition accompanied by: self.  Departure Mode: Ambulatory.    Tr Jj RN

## 2020-03-06 NOTE — NURSING NOTE
"Oncology Rooming Note    March 6, 2020 8:50 AM   Angel Yanez is a 59 year old male who presents for:    Chief Complaint   Patient presents with     RECHECK     provider visit, labs, BM bx s/p bmt txp for multiple myeloma     Initial Vitals: BP (!) 143/86   Pulse 74   Temp 97.4  F (36.3  C) (Oral)   Resp 18   Wt 78.7 kg (173 lb 8 oz)   BMI 24.89 kg/m   Estimated body mass index is 24.89 kg/m  as calculated from the following:    Height as of 2/4/20: 1.778 m (5' 10\").    Weight as of this encounter: 78.7 kg (173 lb 8 oz). Body surface area is 1.97 meters squared.  No Pain (0) Comment: Data Unavailable   No LMP for male patient.  Allergies reviewed: Yes  Medications reviewed: Yes    Medications: Medication refills not needed today.  Pharmacy name entered into EPIC:    [facility] - [place], [place] - [address] [facility] - [place], [place] - [address] [facility]    Clinical concerns: Patient denies fevers/N/V/D.  He denies any pain/discomfort.  Patient has no complaints today.    Labs were drawn peripherally in Clinic by RN.      CLARA ELLSWORTH RN     BMT Teaching Flowsheet    Angel Yanez is a 59 year old male  Diagnoses of Multiple myeloma in remission (H), Thrombocytopenia (H), and Status post bone marrow transplant (H) were pertinent to this visit.    Teaching Topic: bone marrow biopsy    Person(s) involved in teaching: Patient  Motivation Level  High  Asks Questions: Yes  Eager to Learn: Yes  Cooperative: Yes  Receptive (willing/able to accept information): Yes  Any cultural factors/Orthodoxy beliefs that may influence understanding or compliance? No    Patient demonstrates understanding of the following:  - Reason for the appointment, diagnosis and treatment plan: Yes  - Knowledge of proper use of medications and conditions for which they are ordered (with special attention to potential side effects or drug interactions): Yes  - Which situations necessitate " calling provider and whom to contact: Yes    Teaching concerns addressed: after bone marrow biopsy cares      Pain management techniques: Yes    Infection Control:  Patient demonstrates understanding of the following:  Bone marrow biopsy procedure site care taught Yes  Signs and symptoms of infection taught Yes      Instructional Materials Used/Given:  Your Bone Marrow Biopsy      Time spent with patient: 10 minutes

## 2020-03-06 NOTE — PROGRESS NOTES
BMT Clinic Progress Note    Patient ID: Angel Yanez is a 60 yo man D+294 s/p 2nd autologous transplant for MM.   S/p 2 readmissions for FUO (10/16-10/23; 9/23-9/29/19). Readmitted on 12/9/2019 with return of fevers despite celebrex, FARZANA, worsening sob and return of rash. Now with ongoing cytopenias.    Interval history:     Returns for follow up. No new medical complaints. Excited about his counts today. Denies any fevers, chills, cough, congestion or infectious symptoms. No n/v/d. Has some mild bruising but no petechiae or other bleeding. No rashes. s.     ROS: 7 point ROS neg unless specified above.       Physical Exam:   There were no vitals taken for this visit. - Reviewed in flowsheets, /80 otherwise WNL.   Wt Readings from Last 4 Encounters:   03/03/20 78.5 kg (173 lb)   02/29/20 72 kg (158 lb 11.7 oz)   02/27/20 79.6 kg (175 lb 8 oz)   02/25/20 78.9 kg (174 lb)     General: NAD, engaged and talkative.   ENT: oral mucosa moist without ulceration, No OP erythema or exudate  Lungs: CTAB, no w/r/r, nonlabored breathing  Cardiovascular: RRR, S1, S2, no m/r/g  GI/Abdomen: +BS, soft, nontender, non-distended  Skin: bruise on right forearm from previous IV. No other rashes or petechiae.   MSK/Extremities: Warm, well perfused.  Neurologic: alert, and conversant    Data:     Lab Results   Component Value Date    WBC 1.8 (L) 03/03/2020    ANEU 0.9 (L) 03/03/2020    HGB 7.4 (L) 03/03/2020    HCT 22.7 (L) 03/03/2020    PLT 4 (LL) 03/03/2020     03/03/2020    POTASSIUM 3.4 03/03/2020    CHLORIDE 110 (H) 03/03/2020    CO2 26 03/03/2020    GLC 85 03/03/2020    BUN 16 03/03/2020    CR 0.92 03/03/2020    MAG 2.0 03/03/2020    INR 1.53 (H) 12/09/2019    BILITOTAL 0.6 03/03/2020    AST 17 03/03/2020    ALT 18 03/03/2020    ALKPHOS 71 03/03/2020    PROTTOTAL 7.1 03/03/2020    ALBUMIN 3.8 03/03/2020       I have assessed all abnormal lab values for their clinical significance and any values considered clinically  significant have been addressed in the assessment and plan.      Assessment and Plan:     Angel Yanez is a 60 yo man D+294 s/p 2nd autologous transplant for MM with ongoing cytopenias.     1.  BMT/MM:   Slow engraftment; cell dose was only 0.639x10^6. ([facility] [place] - known poor cell dose as failed chemo-mobilization 1/2019.)   - day +180 bone marrow biopsy 11/6/19 which showed no morphologic or immunophenotypic evidence of plasma cell neoplasm. His light chains were not elevated and he had no monoclonal protein in the serum.  He is in clinical remission.  - PET in 8/2019 clear.   - PB FLOW 11/23: No overt aberrant immunophenotype on T cells.  Rare to absent mature B cells and plasma cells.   -  BMBx  Today.      2.  HEME: Keep Hgb>8g/dL, plt >10k.   - Counts improved today s/p gcsf and 2u plts 3/3. Give 1u plts today as pt is off until Tuesday.   -  Last GCSF 3/3/2020.   - Persistent thrombocytopenia: BM 11/6/19 showed nearly 0 platelet precursors. Trial of promacta not helpful. Stopped 12/6/19. Progressively worsening in the past few weeks, appears to temporally relate to taper/cessation of prednisone. Did have a post-transfusion assessment showing response from 2->33 on 2/18/20   - Plan for repeat BMBx as above  - Trial of prednisone burst 60mg qday x2 weeks started 2/27 followed by taper depending on response (previous taper was 4 weeks)  - prior auth for romiplostim- if approved will start after 2 weeks of pred.      3.  ID:   FUOs: Extensive ID work up- no etiology identified. Now afebrile since treated with Pred 30mg qday followed by ~1 month taper. Steroids now resumed for pancytopenia as above (no recurrence of fevers off steroids).   - 12/13 EBUS bx with ngtd.- No actual tissue obtained.  - 12/9 RVP=neg  - 12/9 CT chest is stable with GGO better. No antibiotics given during admission because do not feel like fevers are infection.     Prophy: ACV, pentamidine (2/18). Levo/fluc stopped now off pred.  Resumed with intermittent neutropenia. Continue levo/fluc as he is also on steroids.   t cell subset, Absolute PW4=149 assessed 12/10/19  - influenza vaccination given 11/26/19     4. GI:  no complaints. LFTs wnl 2/18     5. CV: Hx of steroid induced HTN. Currently off lisinopril but now back on steroids; Resumed lisinopril 5mg daily 3/3. Increase to 10mg daily 3/6.      6.  FEN/Renal:   Cr and lytes WNL     7.  Mood: Continue Paxil.     8. 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 Resolved. Recurrence 12/9- resolved with Triamcinolone TID.      9. FUOs- Some kind of auto immune process DDX:  Sarcoidosis? Underwent EBUS FNA which was negative for granulomas however unlikely you could see this on just fluid aspirate so would not consider   - completed pred taper 2/20/2020 as above without recurrence of fevers. Now on steroids for pancytopenia.      10. Psych:  Mood stable on paroxitine       Plan: plts, RBCs, gcsf, and schedule bmbx    RTC: 3/10 for labs and infusion for possible RBCs/plts. Follow up with Dr. Lucio for results of bmbx (requested 3/12).     Fei Meng PA-C  [phone]

## 2020-03-09 LAB
BLD PROD TYP BPU: NORMAL
NUM BPU REQUESTED: 1

## 2020-03-10 ENCOUNTER — APPOINTMENT (OUTPATIENT)
Dept: LAB | Facility: CLINIC | Age: 60
End: 2020-03-10
Attending: INTERNAL MEDICINE
Payer: COMMERCIAL

## 2020-03-10 ENCOUNTER — INFUSION THERAPY VISIT (OUTPATIENT)
Dept: TRANSPLANT | Facility: CLINIC | Age: 60
End: 2020-03-10
Attending: INTERNAL MEDICINE
Payer: COMMERCIAL

## 2020-03-10 VITALS
TEMPERATURE: 98 F | OXYGEN SATURATION: 100 % | SYSTOLIC BLOOD PRESSURE: 137 MMHG | HEART RATE: 86 BPM | RESPIRATION RATE: 16 BRPM | WEIGHT: 175.1 LBS | DIASTOLIC BLOOD PRESSURE: 75 MMHG | BODY MASS INDEX: 25.12 KG/M2

## 2020-03-10 DIAGNOSIS — L40.50 PSORIASIS WITH ARTHROPATHY (H): ICD-10-CM

## 2020-03-10 DIAGNOSIS — Z94.81 STATUS POST BONE MARROW TRANSPLANT (H): Primary | ICD-10-CM

## 2020-03-10 LAB
ABO + RH BLD: NORMAL
ABO + RH BLD: NORMAL
ANION GAP SERPL CALCULATED.3IONS-SCNC: 6 MMOL/L (ref 3–14)
BASOPHILS # BLD AUTO: 0 10E9/L (ref 0–0.2)
BASOPHILS NFR BLD AUTO: 0 %
BLD GP AB SCN SERPL QL: NORMAL
BLD PROD TYP BPU: NORMAL
BLD UNIT ID BPU: 0
BLOOD BANK CMNT PATIENT-IMP: NORMAL
BLOOD PRODUCT CODE: NORMAL
BPU ID: NORMAL
BUN SERPL-MCNC: 22 MG/DL (ref 7–30)
CALCIUM SERPL-MCNC: 8.9 MG/DL (ref 8.5–10.1)
CHLORIDE SERPL-SCNC: 110 MMOL/L (ref 94–109)
CO2 SERPL-SCNC: 27 MMOL/L (ref 20–32)
COPATH REPORT: NORMAL
CREAT SERPL-MCNC: 0.89 MG/DL (ref 0.66–1.25)
DIFFERENTIAL METHOD BLD: ABNORMAL
EOSINOPHIL # BLD AUTO: 0 10E9/L (ref 0–0.7)
EOSINOPHIL NFR BLD AUTO: 0.9 %
ERYTHROCYTE [DISTWIDTH] IN BLOOD BY AUTOMATED COUNT: 21.8 % (ref 10–15)
GFR SERPL CREATININE-BSD FRML MDRD: >90 ML/MIN/{1.73_M2}
GLUCOSE SERPL-MCNC: 82 MG/DL (ref 70–99)
HCT VFR BLD AUTO: 27.6 % (ref 40–53)
HGB BLD-MCNC: 8.9 G/DL (ref 13.3–17.7)
IMM GRANULOCYTES # BLD: 0 10E9/L (ref 0–0.4)
IMM GRANULOCYTES NFR BLD: 0 %
LYMPHOCYTES # BLD AUTO: 0.8 10E9/L (ref 0.8–5.3)
LYMPHOCYTES NFR BLD AUTO: 36.5 %
MCH RBC QN AUTO: 36.6 PG (ref 26.5–33)
MCHC RBC AUTO-ENTMCNC: 32.2 G/DL (ref 31.5–36.5)
MCV RBC AUTO: 114 FL (ref 78–100)
MONOCYTES # BLD AUTO: 0.3 10E9/L (ref 0–1.3)
MONOCYTES NFR BLD AUTO: 14.8 %
NEUTROPHILS # BLD AUTO: 1.1 10E9/L (ref 1.6–8.3)
NEUTROPHILS NFR BLD AUTO: 47.8 %
NRBC # BLD AUTO: 0 10*3/UL
NRBC BLD AUTO-RTO: 0 /100
PLATELET # BLD AUTO: 8 10E9/L (ref 150–450)
POTASSIUM SERPL-SCNC: 3.7 MMOL/L (ref 3.4–5.3)
RBC # BLD AUTO: 2.43 10E12/L (ref 4.4–5.9)
SODIUM SERPL-SCNC: 142 MMOL/L (ref 133–144)
SPECIMEN EXP DATE BLD: NORMAL
TRANSFUSION STATUS PATIENT QL: NORMAL
TRANSFUSION STATUS PATIENT QL: NORMAL
WBC # BLD AUTO: 2.3 10E9/L (ref 4–11)

## 2020-03-10 PROCEDURE — 86850 RBC ANTIBODY SCREEN: CPT | Performed by: INTERNAL MEDICINE

## 2020-03-10 PROCEDURE — 85025 COMPLETE CBC W/AUTO DIFF WBC: CPT | Performed by: PHYSICIAN ASSISTANT

## 2020-03-10 PROCEDURE — P9037 PLATE PHERES LEUKOREDU IRRAD: HCPCS | Performed by: PHYSICIAN ASSISTANT

## 2020-03-10 PROCEDURE — 36430 TRANSFUSION BLD/BLD COMPNT: CPT

## 2020-03-10 PROCEDURE — 86900 BLOOD TYPING SEROLOGIC ABO: CPT | Performed by: INTERNAL MEDICINE

## 2020-03-10 PROCEDURE — 80048 BASIC METABOLIC PNL TOTAL CA: CPT | Performed by: PHYSICIAN ASSISTANT

## 2020-03-10 PROCEDURE — 86901 BLOOD TYPING SEROLOGIC RH(D): CPT | Performed by: INTERNAL MEDICINE

## 2020-03-10 ASSESSMENT — PAIN SCALES - GENERAL: PAINLEVEL: NO PAIN (0)

## 2020-03-10 NOTE — NURSING NOTE
Chief Complaint   Patient presents with     Blood Draw     Labs drawn via  by RN in lab. VS taken. Patient checked in for next appt.     Infusion     Patinet here for infusion of plts.      Radha Coker RN

## 2020-03-10 NOTE — PROGRESS NOTES
Infusion Nursing Note:  Angel Yanez presents today for one dose of Plts.    Patient seen by provider today: No   present during visit today: Not Applicable.    Note: Pt received one dose of Plts for a Plt Count of 8K today. No Premeds administered. Pt tolerated transfusion well. VSS throughout. See Doc Flowsheet for further details.      Intravenous Access:  Peripheral IV placed.    Treatment Conditions:  Lab Results   Component Value Date    HGB 8.9 03/10/2020     Lab Results   Component Value Date    WBC 2.3 03/10/2020      Lab Results   Component Value Date    ANEU 1.1 03/10/2020     Lab Results   Component Value Date    PLT 8 03/10/2020          Post Infusion Assessment:  Patient tolerated infusion without incident.  Site patent and intact, free from redness, edema or discomfort.  Access discontinued per protocol.       Discharge Plan:   Patient discharged in stable condition accompanied by: self.  Departure Mode: Ambulatory.    Radha Coker RN

## 2020-03-12 ENCOUNTER — ONCOLOGY VISIT (OUTPATIENT)
Dept: TRANSPLANT | Facility: CLINIC | Age: 60
End: 2020-03-12
Attending: INTERNAL MEDICINE
Payer: COMMERCIAL

## 2020-03-12 ENCOUNTER — APPOINTMENT (OUTPATIENT)
Dept: LAB | Facility: CLINIC | Age: 60
End: 2020-03-12
Attending: INTERNAL MEDICINE
Payer: COMMERCIAL

## 2020-03-12 VITALS
SYSTOLIC BLOOD PRESSURE: 135 MMHG | OXYGEN SATURATION: 97 % | WEIGHT: 175.8 LBS | TEMPERATURE: 98 F | DIASTOLIC BLOOD PRESSURE: 81 MMHG | HEART RATE: 74 BPM | BODY MASS INDEX: 25.22 KG/M2

## 2020-03-12 DIAGNOSIS — D69.6 THROMBOCYTOPENIA (H): ICD-10-CM

## 2020-03-12 DIAGNOSIS — L40.50 PSORIASIS WITH ARTHROPATHY (H): ICD-10-CM

## 2020-03-12 DIAGNOSIS — C90.00 MULTIPLE MYELOMA NOT HAVING ACHIEVED REMISSION (H): Primary | ICD-10-CM

## 2020-03-12 LAB
ANION GAP SERPL CALCULATED.3IONS-SCNC: 7 MMOL/L (ref 3–14)
BASOPHILS # BLD AUTO: 0 10E9/L (ref 0–0.2)
BASOPHILS NFR BLD AUTO: 0 %
BUN SERPL-MCNC: 18 MG/DL (ref 7–30)
CALCIUM SERPL-MCNC: 8.8 MG/DL (ref 8.5–10.1)
CHLORIDE SERPL-SCNC: 108 MMOL/L (ref 94–109)
CO2 SERPL-SCNC: 25 MMOL/L (ref 20–32)
CREAT SERPL-MCNC: 0.84 MG/DL (ref 0.66–1.25)
DIFFERENTIAL METHOD BLD: ABNORMAL
EOSINOPHIL # BLD AUTO: 0 10E9/L (ref 0–0.7)
EOSINOPHIL NFR BLD AUTO: 0 %
ERYTHROCYTE [DISTWIDTH] IN BLOOD BY AUTOMATED COUNT: 22.1 % (ref 10–15)
FOLATE SERPL-MCNC: 11 NG/ML
GFR SERPL CREATININE-BSD FRML MDRD: >90 ML/MIN/{1.73_M2}
GLUCOSE SERPL-MCNC: 159 MG/DL (ref 70–99)
HCT VFR BLD AUTO: 28.5 % (ref 40–53)
HGB BLD-MCNC: 9.3 G/DL (ref 13.3–17.7)
IMM GRANULOCYTES # BLD: 0 10E9/L (ref 0–0.4)
IMM GRANULOCYTES NFR BLD: 0.6 %
INR PPP: 1.01 (ref 0.86–1.14)
LYMPHOCYTES # BLD AUTO: 0.6 10E9/L (ref 0.8–5.3)
LYMPHOCYTES NFR BLD AUTO: 17.4 %
MCH RBC QN AUTO: 37.2 PG (ref 26.5–33)
MCHC RBC AUTO-ENTMCNC: 32.6 G/DL (ref 31.5–36.5)
MCV RBC AUTO: 114 FL (ref 78–100)
MONOCYTES # BLD AUTO: 0.2 10E9/L (ref 0–1.3)
MONOCYTES NFR BLD AUTO: 5.7 %
NEUTROPHILS # BLD AUTO: 2.4 10E9/L (ref 1.6–8.3)
NEUTROPHILS NFR BLD AUTO: 76.3 %
NRBC # BLD AUTO: 0 10*3/UL
NRBC BLD AUTO-RTO: 0 /100
PLATELET # BLD AUTO: 15 10E9/L (ref 150–450)
POTASSIUM SERPL-SCNC: 4.1 MMOL/L (ref 3.4–5.3)
RBC # BLD AUTO: 2.5 10E12/L (ref 4.4–5.9)
SODIUM SERPL-SCNC: 141 MMOL/L (ref 133–144)
VIT B12 SERPL-MCNC: 553 PG/ML (ref 193–986)
WBC # BLD AUTO: 3.2 10E9/L (ref 4–11)

## 2020-03-12 PROCEDURE — 80048 BASIC METABOLIC PNL TOTAL CA: CPT | Performed by: INTERNAL MEDICINE

## 2020-03-12 PROCEDURE — G0463 HOSPITAL OUTPT CLINIC VISIT: HCPCS

## 2020-03-12 PROCEDURE — 82607 VITAMIN B-12: CPT | Performed by: INTERNAL MEDICINE

## 2020-03-12 PROCEDURE — 36415 COLL VENOUS BLD VENIPUNCTURE: CPT

## 2020-03-12 PROCEDURE — 85025 COMPLETE CBC W/AUTO DIFF WBC: CPT | Performed by: INTERNAL MEDICINE

## 2020-03-12 PROCEDURE — 85610 PROTHROMBIN TIME: CPT | Performed by: INTERNAL MEDICINE

## 2020-03-12 PROCEDURE — 84165 PROTEIN E-PHORESIS SERUM: CPT | Performed by: INTERNAL MEDICINE

## 2020-03-12 PROCEDURE — 82746 ASSAY OF FOLIC ACID SERUM: CPT | Performed by: INTERNAL MEDICINE

## 2020-03-12 PROCEDURE — 00000402 ZZHCL STATISTIC TOTAL PROTEIN: Performed by: INTERNAL MEDICINE

## 2020-03-12 RX ORDER — PREDNISONE 20 MG/1
5 TABLET ORAL
Qty: 100 TABLET | Refills: 1 | COMMUNITY
Start: 2020-03-12 | End: 2020-04-24

## 2020-03-12 ASSESSMENT — PAIN SCALES - GENERAL: PAINLEVEL: NO PAIN (0)

## 2020-03-12 NOTE — LETTER
3/12/2020       RE: Angel Yanez  [address]  [facility] [address]     Dear Colleague,    Thank you for referring your patient, Angel Yanez, to the [facility] BLOOD AND MARROW TRANSPLANT at [facility]. Please see a copy of my visit note below.    [facility]  BMT  Outpatient Progress Note  Last clinic visit: 3/6/20    Hem/onc History:     Patient ID: Angel Yanez is a 60 yo man D+294 s/p 2nd autologous transplant for MM.   S/p 2 readmissions for FUO (10/16-10/23; 9/23-9/29/19). Readmitted on 12/9/2019 with return of fevers despite celebrex, FARZANA, worsening sob and return of rash, improved with course of steroids.    Now with ongoing cytopenias, most prominently thrombocytopenia.      Interval history:     Mr. Yanez presents today unaccompanied. He reports feeling well overall, which he attributes at least in part to the steroids. It has improved his energy and resolved his arthritis. He also has gained some weight and is to a level that he feels more comfortable with (~168lbs). He has been exercising by walking daily and riding his bike intermittently. He has also been doing some work on his bathroom, ensuring to wear a mask when he does.    He has not had any further episodes of epistaxis. He continues to endorse easy bruising.    He continues to work.    He did ask whether he could go on a short family trip to [place] from Thursday - Sunday starting 4/2/2020.    On ROS in addition to the above  Denies  fevers, chills, NS, HA, SOB, CP, n/v, abd pain, constipation/diarrhea, hematochezia, dysuria, hematuria, swelling, rashes, lymphadenopathy    Comprehensive ROS otherwise negative.    Past Medical History:   Reviewed in EPIC    Past Medical History:   Diagnosis Date     GERD (gastroesophageal reflux disease)      H/O autologous stem cell transplant (H) 02/2005     Hyperlipidemia      Multiple myeloma (H) 2004     PONV (postoperative nausea and vomiting)       Psoriasis      Psoriatic arthritis (H)       Past Surgical History:   Reviewed in EPIC    Past Surgical History:   Procedure Laterality Date     ARTHROPLASTY HIP       BIOPSY LYMPH NODE AXILLA N/A 11/15/2019    Procedure: Right axillary lymph node biopsy;  Surgeon: Reese Irving MD;  Location: [facility] OR     COLONOSCOPY       ENDOBRONCHIAL ULTRASOUND FLEXIBLE N/A 12/16/2019    Procedure: flexible bronchoscopy, endobronchial ultrasound;  Surgeon: Kyaw Escudero MD;  Location: [facility] OR     HERNIA REPAIR       IR CVC TUNNEL PLACEMENT > 5 YRS OF AGE  1/22/2019     IR CVC TUNNEL PLACEMENT > 5 YRS OF AGE  5/16/2019     IR CVC TUNNEL REMOVAL LEFT  2/20/2019     IR CVC TUNNEL REMOVAL RIGHT  7/23/2019     IR FOLLOW UP VISIT OUTPATIENT  1/24/2019     IR LYMPH NODE BIOPSY  10/18/2019     ORTHOPEDIC SURGERY       PROCURE BONE MARROW N/A 5/14/2019    Procedure: Bone Marrow [place];  Surgeon: Antwan Erickson MD;  Location: [facility] OR     TRANSPLANT        Social History:   Reviewed in EPIC    Social History     Tobacco Use     Smoking status: Former Smoker     Smokeless tobacco: Never Used   Substance Use Topics     Alcohol use: Yes     Comment: occasionaly      Family History:   Reviewed in EPIC    Family History   Problem Relation Age of Onset     Diabetes Mother      Cerebrovascular Disease Mother      Leukemia Father      Medications:   Reviewed in [facility]    Current Outpatient Medications   Medication Sig     acetaminophen (TYLENOL) 325 MG tablet Take 1-2 tablets (325-650 mg) by mouth every 4 hours as needed for fever     acyclovir (ZOVIRAX) 800 MG tablet Take 1 tablet (800 mg) by mouth 2 times daily     calcium carbonate (CALCIUM CARBONATE) 600 MG tablet Take 2 tablets by mouth daily      Cholecalciferol (VITAMIN D3) 2000 units CAPS Take 2,000 Units by mouth daily      fluconazole (DIFLUCAN) 100 MG tablet Take 1 tablet (100 mg) by mouth daily     levofloxacin (LEVAQUIN) 250 MG tablet Take 1 tablet (250 mg) by mouth  daily     lisinopril (ZESTRIL) 10 MG tablet Take 1 tablet (10 mg) by mouth daily     PARoxetine (PAXIL) 20 MG tablet Take 1 tablet (20 mg) by mouth every morning     pentamidine (NEBUPENT) 300 MG neb solution Inhale 300 mg into the lungs every 28 days Last dose:  2-18-20     predniSONE (DELTASONE) 20 MG tablet Take 60 mg by mouth daily Take 60mg per day.     predniSONE (DELTASONE) 20 MG tablet Take 3 tablets (60 mg) by mouth daily or as directed     No current facility-administered medications for this visit.       Physical Exam (Resident / Clinician):   Blood pressure 135/81, pulse 74, temperature 98  F (36.7  C), temperature source Oral, weight 79.7 kg (175 lb 12.8 oz), SpO2 97 %.    ECOG PS: 1  Constitutional: WDWN male in NAD, pleasant and appropriate  HEENT:  NC/AT, no icterus, OP clear, MMM  Skin: Multiple ecchymoses in b/l UE; No jaundice   Lungs: CTAB, no w/r/r, nonlabored breathing  Cardiovascular: RRR, S1, S2, no m/r/g  GI/Abdomen: +BS, soft, nontender, nondistended, no organomegaly nor masses  Back: no tenderness over spinous processes, iliac crests, scapulae  MSK/Extremities: Warm, well perfused. No edema  LN: no cervical, supraclavicular, nor axillary lymphadenopathy  Neurologic: alert, answering questions appropriately, moving all extremities spontaneously  Psych: appropriate affect  Data:   Reviewed in Epic    Results for SAMEER KNIGHT (MRN [id number]) as of 3/13/2020 19:36   Ref. Range 3/12/2020 14:26 3/12/2020 15:10   Sodium Latest Ref Range: 133 - 144 mmol/L 141    Potassium Latest Ref Range: 3.4 - 5.3 mmol/L 4.1    Chloride Latest Ref Range: 94 - 109 mmol/L 108    Carbon Dioxide Latest Ref Range: 20 - 32 mmol/L 25    Urea Nitrogen Latest Ref Range: 7 - 30 mg/dL 18    Creatinine Latest Ref Range: 0.66 - 1.25 mg/dL 0.84    GFR Estimate Latest Ref Range: >60 mL/min/1.73_m2 >90    GFR Estimate If Black Latest Ref Range: >60 mL/min/1.73_m2 >90    Calcium Latest Ref Range: 8.5 - 10.1 mg/dL 8.8    Anion  Gap Latest Ref Range: 3 - 14 mmol/L 7    Folate Latest Ref Range: >5.4 ng/mL 11.0    Vitamin B12 Latest Ref Range: 193 - 986 pg/mL 553    Glucose Latest Ref Range: 70 - 99 mg/dL 159 (H)    WBC Latest Ref Range: 4.0 - 11.0 10e9/L  3.2 (L)   Hemoglobin Latest Ref Range: 13.3 - 17.7 g/dL  9.3 (L)   Hematocrit Latest Ref Range: 40.0 - 53.0 %  28.5 (L)   Platelet Count Latest Ref Range: 150 - 450 10e9/L  15 (LL)   RBC Count Latest Ref Range: 4.4 - 5.9 10e12/L  2.50 (L)   MCV Latest Ref Range: 78 - 100 fl  114 (H)   MCH Latest Ref Range: 26.5 - 33.0 pg  37.2 (H)   MCHC Latest Ref Range: 31.5 - 36.5 g/dL  32.6   RDW Latest Ref Range: 10.0 - 15.0 %  22.1 (H)   Diff Method Unknown  Automated Method   % Neutrophils Latest Units: %  76.3   % Lymphocytes Latest Units: %  17.4   % Monocytes Latest Units: %  5.7   % Eosinophils Latest Units: %  0.0   % Basophils Latest Units: %  0.0   % Immature Granulocytes Latest Units: %  0.6   Nucleated RBCs Latest Ref Range: 0 /100  0   Absolute Neutrophil Latest Ref Range: 1.6 - 8.3 10e9/L  2.4   Absolute Lymphocytes Latest Ref Range: 0.8 - 5.3 10e9/L  0.6 (L)   Absolute Monocytes Latest Ref Range: 0.0 - 1.3 10e9/L  0.2   Absolute Eosinophils Latest Ref Range: 0.0 - 0.7 10e9/L  0.0   Absolute Basophils Latest Ref Range: 0.0 - 0.2 10e9/L  0.0   Abs Immature Granulocytes Latest Ref Range: 0 - 0.4 10e9/L  0.0   Absolute Nucleated RBC Unknown  0.0   INR Latest Ref Range: 0.86 - 1.14  1.01      BMBx 3/6/20  FINAL DIAGNOSIS:   Bone marrow, posterior iliac crest, left decalcified trephine biopsy and   touch imprint; left particle crush,   direct aspirate smear, and concentrated aspirate smear; and peripheral   blood smear:     - No morphologic or immunophenotypic evidence of recurrent/persistent   plasma cell neoplasm (see comment)     - Cellular marrow with trilineage hematopoiesis, dyserythropoiesis, and   no increase in blasts     - No increase in ring sideroblasts     - Less than 5% polytypic  plasma cells     - Peripheral blood showing marked macrocytic anemia, lymphocytopenia,   and marked thrombocytopenia     COMMENT:   By separate report ([id number]), flow cytometric immunophenotyping   performed on a left marrow aspirate sample   shows polytypic plasma cells and no aberrant immunophenotype on T cells.   The trephine core is partially crushed and cellularity is difficult to   approximate due to the mechanical   disruption of the normal marrow architecture. There are foci of   hypocellular, nearly acellular marrow and   other areas where the marrow cellularity is approximated at 50%, overall   the cellularity is estimated to be   20-30%.   There is dyserythropoiesis characterized by nuclear budding and   megaloblastoid changes in a subset of the   erythroid precursors. Only very rare ring sideroblasts are identified on   the Dacie iron stain. These changes   are favored to be regenerative/treatment-related in nature.     Assessment and Plan:     Angel Yanez is a 58 yo man D+300 s/p 2nd autologous transplant for MM with ongoing cytopenias.     1.  BMT/MM:   Slow engraftment; cell dose was only 0.639x10^6. ([place]arrow [place] - known poor cell dose as failed chemo-mobilization 1/2019.)   - day +180 bone marrow biopsy 11/6/19 which showed no morphologic or immunophenotypic evidence of plasma cell neoplasm. His light chains were not elevated and he had no monoclonal protein in the serum.  He is in clinical remission.  - PET in 8/2019 clear.   - PB FLOW 11/23: No overt aberrant immunophenotype on T cells.  Rare to absent mature B cells and plasma cells.   - Repeat BMBx 3/6/20 due to ongoing cytopenias with dyserythropoiesis which may be regenerative/treatment-related; megakaryocytes present but due to suboptimal specimen cannot determine if they are reduced in numbers. Pending MDS NGS       2.  HEME: Keep Hgb>8g/dL, plt >10k.   - Counts improved today .   -  Last GCSF 3/3/2020, last plt 3/6/2020.   -  Persistent thrombocytopenia: BM 11/6/19 showed nearly 0 platelet precursors. Trial of promacta not helpful. Stopped 12/6/19. Progressively worsening in the past few weeks, appears to temporally relate to taper/cessation of prednisone. Did have a post-transfusion assessment showing response from 2->33 on 2/18/20. Resumed prednisone 2/27 60mg/day, start taper 3/12.  - Discontinue levofloxacin 3/12 as it has been known to cause drug-induced thrombocytopenia  - B12 and folate assessed today due to trending macrocytosis (returned wnl)  - BMBx 3/6/20 without identifiable etiology for cytopenias, pending MDS NGS.  - prior auth for romiplostim approved as of 3/12/20, but will hold off as we start to taper prednisone and in light of his platelet count being 15 today.    3.  ID:   FUOs: Extensive ID work up- no etiology identified. Now afebrile since treated with Pred 30mg qday followed by ~1 month taper. Steroids now resumed for pancytopenia as above (no recurrence of fevers off steroids), tapering steroids as above  - 12/13 EBUS bx with ngtd.- No actual tissue obtained.  - 12/9 RVP=neg  - 12/9 CT chest is stable with GGO better. No antibiotics given during admission because do not feel like fevers are infection.     Prophy: ACV, pentamidine (2/18). Continue fluc as he is also on steroids and intermittent neutropenia. Discontinued levofloxacin due to association with severe thrombocytopenia. If needed, can use other antibacterial prophy.  t cell subset, Absolute XX1=348 assessed 12/10/19  - influenza vaccination given 11/26/19     4. GI:  no complaints. LFTs wnl 2/18     5. CV: Hx of steroid induced HTN. Currently off lisinopril but now back on steroids; Resumed lisinopril 5mg daily 3/3. Increase to 10mg daily 3/6.      6.  FEN/Renal:   Cr and lytes WNL     7.  Mood: Continue Paxil.     8. 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 Resolved. Recurrence 12/9- resolved with  Triamcinolone TID.      9. FUOs- Some kind of auto immune process DDX:  Sarcoidosis? Underwent EBUS FNA which was negative for granulomas however unlikely you could see this on just fluid aspirate so would not consider   - completed pred taper 2/20/2020 as above without recurrence of fevers. Now on steroids for pancytopenia.      10. Psych:  Mood stable on paroxitine     Plan:   -Taper steroids;   Reconsider romplostim if plt counts stay very low.  -Discontinue levofloxacin (has been reported to cause drug inducted thrombocytopenia)  -continue to reassess wisdom of romiplostim trial in the future depending on the trajectory of his thrombocytopenia  -RTC Monday with labs and possible plt transfusion    Labs, imaging and treatment plan reviewed with patient. All questions answered.    In this 40 minutes visit, > 50% spent in counseling and coordinating care.    Patient discussed with Dr. Lucio.    Stan AvalosNew BremenAngelito Manning MD, PhD   Hem/Onc Fellow    Patient has been seen and evaluated by me. I have reviewed today's vital signs, medications, labs and imaging results. I have discussed the plan with the team and agree with the findings and plan in this note.      No new change in low counts but RBC transfusion  needs are infrequent; responds to GCSF promptly when needed; and generally has suitable post transfusion platelet count increment.    No new clues to identify added therapy on BM Bx pending cytogenetics and NGS testing.   No active myeloma    Cont slow pred taper and consider romplostim therapy as next step.  Discussed at length today and questions answered in full  Adebayo Lucio MD    Again, thank you for allowing me to participate in the care of your patient.      Sincerely,    BMT DOM

## 2020-03-12 NOTE — NURSING NOTE
"Oncology Rooming Note    March 12, 2020 2:35 PM   Angel Yanez is a 59 year old male who presents for:    Chief Complaint   Patient presents with     Blood Draw      blood draw and vitals     RECHECK     Pt is here for a rtn for MM     Initial Vitals: Blood Pressure 135/81   Pulse 74   Temperature 98  F (36.7  C) (Oral)   Weight 79.7 kg (175 lb 12.8 oz)   Oxygen Saturation 97%   Body Mass Index 25.22 kg/m   Estimated body mass index is 25.22 kg/m  as calculated from the following:    Height as of 2/4/20: 1.778 m (5' 10\").    Weight as of this encounter: 79.7 kg (175 lb 12.8 oz). Body surface area is 1.98 meters squared.  No Pain (0) Comment: Data Unavailable   No LMP for male patient.  Allergies reviewed: Yes  Medications reviewed: Yes    Medications: Medication refills not needed today.  Pharmacy name entered into EPIC:    [facility] [address] [facility] - [place], [place] - [address]    Clinical concerns: none       Shoshaan Salazar MA            "

## 2020-03-12 NOTE — NURSING NOTE
Vitals done, labs drawn by venipuncture.  See doc flow sheets for details.  Yolie Ray, JORGE LUIS March 12, 2020

## 2020-03-12 NOTE — PROGRESS NOTES
[facility]  BMT  Outpatient Progress Note  Last clinic visit: 3/6/20    Hem/onc History:     Patient ID: Angel Yanez is a 58 yo man D+294 s/p 2nd autologous transplant for MM.   S/p 2 readmissions for FUO (10/16-10/23; 9/23-9/29/19). Readmitted on 12/9/2019 with return of fevers despite celebrex, FARZANA, worsening sob and return of rash, improved with course of steroids.    Now with ongoing cytopenias, most prominently thrombocytopenia.      Interval history:     Mr. Yanez presents today unaccompanied. He reports feeling well overall, which he attributes at least in part to the steroids. It has improved his energy and resolved his arthritis. He also has gained some weight and is to a level that he feels more comfortable with (~168lbs). He has been exercising by walking daily and riding his bike intermittently. He has also been doing some work on his bathroom, ensuring to wear a mask when he does.    He has not had any further episodes of epistaxis. He continues to endorse easy bruising.    He continues to work.    He did ask whether he could go on a short family trip to [place] from Thursday - Sunday starting 4/2/2020.    On ROS in addition to the above  Denies  fevers, chills, NS, HA, SOB, CP, n/v, abd pain, constipation/diarrhea, hematochezia, dysuria, hematuria, swelling, rashes, lymphadenopathy    Comprehensive ROS otherwise negative.    Past Medical History:   Reviewed in EPIC    Past Medical History:   Diagnosis Date     GERD (gastroesophageal reflux disease)      H/O autologous stem cell transplant (H) 02/2005     Hyperlipidemia      Multiple myeloma (H) 2004     PONV (postoperative nausea and vomiting)      Psoriasis      Psoriatic arthritis (H)       Past Surgical History:   Reviewed in EPIC    Past Surgical History:   Procedure Laterality Date     ARTHROPLASTY HIP       BIOPSY LYMPH NODE AXILLA N/A 11/15/2019    Procedure: Right axillary lymph node biopsy;  Surgeon: Reese Irving MD;   Location: [facility] OR     COLONOSCOPY       ENDOBRONCHIAL ULTRASOUND FLEXIBLE N/A 12/16/2019    Procedure: flexible bronchoscopy, endobronchial ultrasound;  Surgeon: Kyaw Escudero MD;  Location: [facility] OR     HERNIA REPAIR       IR CVC TUNNEL PLACEMENT > 5 YRS OF AGE  1/22/2019     IR CVC TUNNEL PLACEMENT > 5 YRS OF AGE  5/16/2019     IR CVC TUNNEL REMOVAL LEFT  2/20/2019     IR CVC TUNNEL REMOVAL RIGHT  7/23/2019     IR FOLLOW UP VISIT OUTPATIENT  1/24/2019     IR LYMPH NODE BIOPSY  10/18/2019     ORTHOPEDIC SURGERY       PROCURE BONE MARROW N/A 5/14/2019    Procedure: Bone Marrow [place];  Surgeon: Antwan Erickson MD;  Location: [facility] OR     TRANSPLANT        Social History:   Reviewed in EPIC    Social History     Tobacco Use     Smoking status: Former Smoker     Smokeless tobacco: Never Used   Substance Use Topics     Alcohol use: Yes     Comment: occasionaly      Family History:   Reviewed in EPIC    Family History   Problem Relation Age of Onset     Diabetes Mother      Cerebrovascular Disease Mother      Leukemia Father      Medications:   Reviewed in [facility]    Current Outpatient Medications   Medication Sig     acetaminophen (TYLENOL) 325 MG tablet Take 1-2 tablets (325-650 mg) by mouth every 4 hours as needed for fever     acyclovir (ZOVIRAX) 800 MG tablet Take 1 tablet (800 mg) by mouth 2 times daily     calcium carbonate (CALCIUM CARBONATE) 600 MG tablet Take 2 tablets by mouth daily      Cholecalciferol (VITAMIN D3) 2000 units CAPS Take 2,000 Units by mouth daily      fluconazole (DIFLUCAN) 100 MG tablet Take 1 tablet (100 mg) by mouth daily     levofloxacin (LEVAQUIN) 250 MG tablet Take 1 tablet (250 mg) by mouth daily     lisinopril (ZESTRIL) 10 MG tablet Take 1 tablet (10 mg) by mouth daily     PARoxetine (PAXIL) 20 MG tablet Take 1 tablet (20 mg) by mouth every morning     pentamidine (NEBUPENT) 300 MG neb solution Inhale 300 mg into the lungs every 28 days Last dose:  2-18-20     predniSONE  (DELTASONE) 20 MG tablet Take 60 mg by mouth daily Take 60mg per day.     predniSONE (DELTASONE) 20 MG tablet Take 3 tablets (60 mg) by mouth daily or as directed     No current facility-administered medications for this visit.       Physical Exam (Resident / Clinician):   Blood pressure 135/81, pulse 74, temperature 98  F (36.7  C), temperature source Oral, weight 79.7 kg (175 lb 12.8 oz), SpO2 97 %.    ECOG PS: 1  Constitutional: WDWN male in NAD, pleasant and appropriate  HEENT:  NC/AT, no icterus, OP clear, MMM  Skin: Multiple ecchymoses in b/l UE; No jaundice   Lungs: CTAB, no w/r/r, nonlabored breathing  Cardiovascular: RRR, S1, S2, no m/r/g  GI/Abdomen: +BS, soft, nontender, nondistended, no organomegaly nor masses  Back: no tenderness over spinous processes, iliac crests, scapulae  MSK/Extremities: Warm, well perfused. No edema  LN: no cervical, supraclavicular, nor axillary lymphadenopathy  Neurologic: alert, answering questions appropriately, moving all extremities spontaneously  Psych: appropriate affect  Data:   Reviewed in Epic    Results for SAMEER KNIGHT SUMMER (MRN [id number]) as of 3/13/2020 19:36   Ref. Range 3/12/2020 14:26 3/12/2020 15:10   Sodium Latest Ref Range: 133 - 144 mmol/L 141    Potassium Latest Ref Range: 3.4 - 5.3 mmol/L 4.1    Chloride Latest Ref Range: 94 - 109 mmol/L 108    Carbon Dioxide Latest Ref Range: 20 - 32 mmol/L 25    Urea Nitrogen Latest Ref Range: 7 - 30 mg/dL 18    Creatinine Latest Ref Range: 0.66 - 1.25 mg/dL 0.84    GFR Estimate Latest Ref Range: >60 mL/min/1.73_m2 >90    GFR Estimate If Black Latest Ref Range: >60 mL/min/1.73_m2 >90    Calcium Latest Ref Range: 8.5 - 10.1 mg/dL 8.8    Anion Gap Latest Ref Range: 3 - 14 mmol/L 7    Folate Latest Ref Range: >5.4 ng/mL 11.0    Vitamin B12 Latest Ref Range: 193 - 986 pg/mL 553    Glucose Latest Ref Range: 70 - 99 mg/dL 159 (H)    WBC Latest Ref Range: 4.0 - 11.0 10e9/L  3.2 (L)   Hemoglobin Latest Ref Range: 13.3 - 17.7 g/dL   9.3 (L)   Hematocrit Latest Ref Range: 40.0 - 53.0 %  28.5 (L)   Platelet Count Latest Ref Range: 150 - 450 10e9/L  15 (LL)   RBC Count Latest Ref Range: 4.4 - 5.9 10e12/L  2.50 (L)   MCV Latest Ref Range: 78 - 100 fl  114 (H)   MCH Latest Ref Range: 26.5 - 33.0 pg  37.2 (H)   MCHC Latest Ref Range: 31.5 - 36.5 g/dL  32.6   RDW Latest Ref Range: 10.0 - 15.0 %  22.1 (H)   Diff Method Unknown  Automated Method   % Neutrophils Latest Units: %  76.3   % Lymphocytes Latest Units: %  17.4   % Monocytes Latest Units: %  5.7   % Eosinophils Latest Units: %  0.0   % Basophils Latest Units: %  0.0   % Immature Granulocytes Latest Units: %  0.6   Nucleated RBCs Latest Ref Range: 0 /100  0   Absolute Neutrophil Latest Ref Range: 1.6 - 8.3 10e9/L  2.4   Absolute Lymphocytes Latest Ref Range: 0.8 - 5.3 10e9/L  0.6 (L)   Absolute Monocytes Latest Ref Range: 0.0 - 1.3 10e9/L  0.2   Absolute Eosinophils Latest Ref Range: 0.0 - 0.7 10e9/L  0.0   Absolute Basophils Latest Ref Range: 0.0 - 0.2 10e9/L  0.0   Abs Immature Granulocytes Latest Ref Range: 0 - 0.4 10e9/L  0.0   Absolute Nucleated RBC Unknown  0.0   INR Latest Ref Range: 0.86 - 1.14  1.01      BMBx 3/6/20  FINAL DIAGNOSIS:   Bone marrow, posterior iliac crest, left decalcified trephine biopsy and   touch imprint; left particle crush,   direct aspirate smear, and concentrated aspirate smear; and peripheral   blood smear:     - No morphologic or immunophenotypic evidence of recurrent/persistent   plasma cell neoplasm (see comment)     - Cellular marrow with trilineage hematopoiesis, dyserythropoiesis, and   no increase in blasts     - No increase in ring sideroblasts     - Less than 5% polytypic plasma cells     - Peripheral blood showing marked macrocytic anemia, lymphocytopenia,   and marked thrombocytopenia     COMMENT:   By separate report ([id number]), flow cytometric immunophenotyping   performed on a left marrow aspirate sample   shows polytypic plasma cells and no  aberrant immunophenotype on T cells.   The trephine core is partially crushed and cellularity is difficult to   approximate due to the mechanical   disruption of the normal marrow architecture. There are foci of   hypocellular, nearly acellular marrow and   other areas where the marrow cellularity is approximated at 50%, overall   the cellularity is estimated to be   20-30%.   There is dyserythropoiesis characterized by nuclear budding and   megaloblastoid changes in a subset of the   erythroid precursors. Only very rare ring sideroblasts are identified on   the Dacie iron stain. These changes   are favored to be regenerative/treatment-related in nature.     Assessment and Plan:     Angel Yanez is a 58 yo man D+300 s/p 2nd autologous transplant for MM with ongoing cytopenias.     1.  BMT/MM:   Slow engraftment; cell dose was only 0.639x10^6. ([place]arrow [place] - known poor cell dose as failed chemo-mobilization 1/2019.)   - day +180 bone marrow biopsy 11/6/19 which showed no morphologic or immunophenotypic evidence of plasma cell neoplasm. His light chains were not elevated and he had no monoclonal protein in the serum.  He is in clinical remission.  - PET in 8/2019 clear.   - PB FLOW 11/23: No overt aberrant immunophenotype on T cells.  Rare to absent mature B cells and plasma cells.   - Repeat BMBx 3/6/20 due to ongoing cytopenias with dyserythropoiesis which may be regenerative/treatment-related; megakaryocytes present but due to suboptimal specimen cannot determine if they are reduced in numbers. Pending MDS NGS       2.  HEME: Keep Hgb>8g/dL, plt >10k.   - Counts improved today .   -  Last GCSF 3/3/2020, last plt 3/6/2020.   - Persistent thrombocytopenia: BM 11/6/19 showed nearly 0 platelet precursors. Trial of promacta not helpful. Stopped 12/6/19. Progressively worsening in the past few weeks, appears to temporally relate to taper/cessation of prednisone. Did have a post-transfusion assessment showing  response from 2->33 on 2/18/20. Resumed prednisone 2/27 60mg/day, start taper 3/12.  - Discontinue levofloxacin 3/12 as it has been known to cause drug-induced thrombocytopenia  - B12 and folate assessed today due to trending macrocytosis (returned wnl)  - BMBx 3/6/20 without identifiable etiology for cytopenias, pending MDS NGS.  - prior auth for romiplostim approved as of 3/12/20, but will hold off as we start to taper prednisone and in light of his platelet count being 15 today.    3.  ID:   FUOs: Extensive ID work up- no etiology identified. Now afebrile since treated with Pred 30mg qday followed by ~1 month taper. Steroids now resumed for pancytopenia as above (no recurrence of fevers off steroids), tapering steroids as above  - 12/13 EBUS bx with ngtd.- No actual tissue obtained.  - 12/9 RVP=neg  - 12/9 CT chest is stable with GGO better. No antibiotics given during admission because do not feel like fevers are infection.     Prophy: ACV, pentamidine (2/18). Continue fluc as he is also on steroids and intermittent neutropenia. Discontinued levofloxacin due to association with severe thrombocytopenia. If needed, can use other antibacterial prophy.  t cell subset, Absolute KB2=900 assessed 12/10/19  - influenza vaccination given 11/26/19     4. GI:  no complaints. LFTs wnl 2/18     5. CV: Hx of steroid induced HTN. Currently off lisinopril but now back on steroids; Resumed lisinopril 5mg daily 3/3. Increase to 10mg daily 3/6.      6.  FEN/Renal:   Cr and lytes WNL     7.  Mood: Continue Paxil.     8. 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 Resolved. Recurrence 12/9- resolved with Triamcinolone TID.      9. FUOs- Some kind of auto immune process DDX:  Sarcoidosis? Underwent EBUS FNA which was negative for granulomas however unlikely you could see this on just fluid aspirate so would not consider   - completed pred taper 2/20/2020 as above without recurrence of  fevers. Now on steroids for pancytopenia.      10. Psych:  Mood stable on paroxitine     Plan:   -Taper steroids;   Reconsider romplostim if plt counts stay very low.  -Discontinue levofloxacin (has been reported to cause drug inducted thrombocytopenia)  -continue to reassess wisdom of romiplostim trial in the future depending on the trajectory of his thrombocytopenia  -RTC Monday with labs and possible plt transfusion    Labs, imaging and treatment plan reviewed with patient. All questions answered.    In this 40 minutes visit, > 50% spent in counseling and coordinating care.    Patient discussed with Dr. Lucio.    Stan AvalosHarrisonAngelito Manning MD, PhD   Hem/Onc Fellow    Patient has been seen and evaluated by me. I have reviewed today's vital signs, medications, labs and imaging results. I have discussed the plan with the team and agree with the findings and plan in this note.      No new change in low counts but RBC transfusion  needs are infrequent; responds to GCSF promptly when needed; and generally has suitable post transfusion platelet count increment.    No new clues to identify added therapy on BM Bx pending cytogenetics and NGS testing.   No active myeloma    Cont slow pred taper and consider romplostim therapy as next step.  Discussed at length today and questions answered in full  Adebayo Lucio MD

## 2020-03-12 NOTE — LETTER
3/12/2020      RE: Angel Yanez  [address]  [facility] [address]       [facility]  BMT  Outpatient Progress Note  Last clinic visit: 3/6/20    Hem/onc History:     Patient ID: Angel Yanez is a 58 yo man D+294 s/p 2nd autologous transplant for MM.   S/p 2 readmissions for FUO (10/16-10/23; 9/23-9/29/19). Readmitted on 12/9/2019 with return of fevers despite celebrex, FARZANA, worsening sob and return of rash, improved with course of steroids.    Now with ongoing cytopenias, most prominently thrombocytopenia.      Interval history:     Mr. Yanez presents today unaccompanied. He reports feeling well overall, which he attributes at least in part to the steroids. It has improved his energy and resolved his arthritis. He also has gained some weight and is to a level that he feels more comfortable with (~168lbs). He has been exercising by walking daily and riding his bike intermittently. He has also been doing some work on his bathroom, ensuring to wear a mask when he does.    He has not had any further episodes of epistaxis. He continues to endorse easy bruising.    He continues to work.    He did ask whether he could go on a short family trip to [place] from Thursday - Sunday starting 4/2/2020.    On ROS in addition to the above  Denies  fevers, chills, NS, HA, SOB, CP, n/v, abd pain, constipation/diarrhea, hematochezia, dysuria, hematuria, swelling, rashes, lymphadenopathy    Comprehensive ROS otherwise negative.    Past Medical History:   Reviewed in EPIC    Past Medical History:   Diagnosis Date     GERD (gastroesophageal reflux disease)      H/O autologous stem cell transplant (H) 02/2005     Hyperlipidemia      Multiple myeloma (H) 2004     PONV (postoperative nausea and vomiting)      Psoriasis      Psoriatic arthritis (H)       Past Surgical History:   Reviewed in EPIC    Past Surgical History:   Procedure Laterality Date     ARTHROPLASTY HIP       BIOPSY LYMPH NODE AXILLA N/A 11/15/2019     Procedure: Right axillary lymph node biopsy;  Surgeon: Reese Irving MD;  Location: [facility] OR     COLONOSCOPY       ENDOBRONCHIAL ULTRASOUND FLEXIBLE N/A 12/16/2019    Procedure: flexible bronchoscopy, endobronchial ultrasound;  Surgeon: Kyaw Escudero MD;  Location: [facility] OR     HERNIA REPAIR       IR CVC TUNNEL PLACEMENT > 5 YRS OF AGE  1/22/2019     IR CVC TUNNEL PLACEMENT > 5 YRS OF AGE  5/16/2019     IR CVC TUNNEL REMOVAL LEFT  2/20/2019     IR CVC TUNNEL REMOVAL RIGHT  7/23/2019     IR FOLLOW UP VISIT OUTPATIENT  1/24/2019     IR LYMPH NODE BIOPSY  10/18/2019     ORTHOPEDIC SURGERY       PROCURE BONE MARROW N/A 5/14/2019    Procedure: Bone Marrow [place];  Surgeon: Antwan Erickson MD;  Location: [facility] OR     TRANSPLANT        Social History:   Reviewed in EPIC    Social History     Tobacco Use     Smoking status: Former Smoker     Smokeless tobacco: Never Used   Substance Use Topics     Alcohol use: Yes     Comment: occasionaly      Family History:   Reviewed in EPIC    Family History   Problem Relation Age of Onset     Diabetes Mother      Cerebrovascular Disease Mother      Leukemia Father      Medications:   Reviewed in [facility]    Current Outpatient Medications   Medication Sig     acetaminophen (TYLENOL) 325 MG tablet Take 1-2 tablets (325-650 mg) by mouth every 4 hours as needed for fever     acyclovir (ZOVIRAX) 800 MG tablet Take 1 tablet (800 mg) by mouth 2 times daily     calcium carbonate (CALCIUM CARBONATE) 600 MG tablet Take 2 tablets by mouth daily      Cholecalciferol (VITAMIN D3) 2000 units CAPS Take 2,000 Units by mouth daily      fluconazole (DIFLUCAN) 100 MG tablet Take 1 tablet (100 mg) by mouth daily     levofloxacin (LEVAQUIN) 250 MG tablet Take 1 tablet (250 mg) by mouth daily     lisinopril (ZESTRIL) 10 MG tablet Take 1 tablet (10 mg) by mouth daily     PARoxetine (PAXIL) 20 MG tablet Take 1 tablet (20 mg) by mouth every morning     pentamidine (NEBUPENT) 300 MG neb solution Inhale  300 mg into the lungs every 28 days Last dose:  2-18-20     predniSONE (DELTASONE) 20 MG tablet Take 60 mg by mouth daily Take 60mg per day.     predniSONE (DELTASONE) 20 MG tablet Take 3 tablets (60 mg) by mouth daily or as directed     No current facility-administered medications for this visit.       Physical Exam (Resident / Clinician):   Blood pressure 135/81, pulse 74, temperature 98  F (36.7  C), temperature source Oral, weight 79.7 kg (175 lb 12.8 oz), SpO2 97 %.    ECOG PS: 1  Constitutional: WDWN male in NAD, pleasant and appropriate  HEENT:  NC/AT, no icterus, OP clear, MMM  Skin: Multiple ecchymoses in b/l UE; No jaundice   Lungs: CTAB, no w/r/r, nonlabored breathing  Cardiovascular: RRR, S1, S2, no m/r/g  GI/Abdomen: +BS, soft, nontender, nondistended, no organomegaly nor masses  Back: no tenderness over spinous processes, iliac crests, scapulae  MSK/Extremities: Warm, well perfused. No edema  LN: no cervical, supraclavicular, nor axillary lymphadenopathy  Neurologic: alert, answering questions appropriately, moving all extremities spontaneously  Psych: appropriate affect  Data:   Reviewed in Epic    Results for SAMEER KNIGHT (MRN [id number]) as of 3/13/2020 19:36   Ref. Range 3/12/2020 14:26 3/12/2020 15:10   Sodium Latest Ref Range: 133 - 144 mmol/L 141    Potassium Latest Ref Range: 3.4 - 5.3 mmol/L 4.1    Chloride Latest Ref Range: 94 - 109 mmol/L 108    Carbon Dioxide Latest Ref Range: 20 - 32 mmol/L 25    Urea Nitrogen Latest Ref Range: 7 - 30 mg/dL 18    Creatinine Latest Ref Range: 0.66 - 1.25 mg/dL 0.84    GFR Estimate Latest Ref Range: >60 mL/min/1.73_m2 >90    GFR Estimate If Black Latest Ref Range: >60 mL/min/1.73_m2 >90    Calcium Latest Ref Range: 8.5 - 10.1 mg/dL 8.8    Anion Gap Latest Ref Range: 3 - 14 mmol/L 7    Folate Latest Ref Range: >5.4 ng/mL 11.0    Vitamin B12 Latest Ref Range: 193 - 986 pg/mL 553    Glucose Latest Ref Range: 70 - 99 mg/dL 159 (H)    WBC Latest Ref Range: 4.0 -  11.0 10e9/L  3.2 (L)   Hemoglobin Latest Ref Range: 13.3 - 17.7 g/dL  9.3 (L)   Hematocrit Latest Ref Range: 40.0 - 53.0 %  28.5 (L)   Platelet Count Latest Ref Range: 150 - 450 10e9/L  15 (LL)   RBC Count Latest Ref Range: 4.4 - 5.9 10e12/L  2.50 (L)   MCV Latest Ref Range: 78 - 100 fl  114 (H)   MCH Latest Ref Range: 26.5 - 33.0 pg  37.2 (H)   MCHC Latest Ref Range: 31.5 - 36.5 g/dL  32.6   RDW Latest Ref Range: 10.0 - 15.0 %  22.1 (H)   Diff Method Unknown  Automated Method   % Neutrophils Latest Units: %  76.3   % Lymphocytes Latest Units: %  17.4   % Monocytes Latest Units: %  5.7   % Eosinophils Latest Units: %  0.0   % Basophils Latest Units: %  0.0   % Immature Granulocytes Latest Units: %  0.6   Nucleated RBCs Latest Ref Range: 0 /100  0   Absolute Neutrophil Latest Ref Range: 1.6 - 8.3 10e9/L  2.4   Absolute Lymphocytes Latest Ref Range: 0.8 - 5.3 10e9/L  0.6 (L)   Absolute Monocytes Latest Ref Range: 0.0 - 1.3 10e9/L  0.2   Absolute Eosinophils Latest Ref Range: 0.0 - 0.7 10e9/L  0.0   Absolute Basophils Latest Ref Range: 0.0 - 0.2 10e9/L  0.0   Abs Immature Granulocytes Latest Ref Range: 0 - 0.4 10e9/L  0.0   Absolute Nucleated RBC Unknown  0.0   INR Latest Ref Range: 0.86 - 1.14  1.01      BMBx 3/6/20  FINAL DIAGNOSIS:   Bone marrow, posterior iliac crest, left decalcified trephine biopsy and   touch imprint; left particle crush,   direct aspirate smear, and concentrated aspirate smear; and peripheral   blood smear:     - No morphologic or immunophenotypic evidence of recurrent/persistent   plasma cell neoplasm (see comment)     - Cellular marrow with trilineage hematopoiesis, dyserythropoiesis, and   no increase in blasts     - No increase in ring sideroblasts     - Less than 5% polytypic plasma cells     - Peripheral blood showing marked macrocytic anemia, lymphocytopenia,   and marked thrombocytopenia     COMMENT:   By separate report ([id number]), flow cytometric immunophenotyping   performed on a  left marrow aspirate sample   shows polytypic plasma cells and no aberrant immunophenotype on T cells.   The trephine core is partially crushed and cellularity is difficult to   approximate due to the mechanical   disruption of the normal marrow architecture. There are foci of   hypocellular, nearly acellular marrow and   other areas where the marrow cellularity is approximated at 50%, overall   the cellularity is estimated to be   20-30%.   There is dyserythropoiesis characterized by nuclear budding and   megaloblastoid changes in a subset of the   erythroid precursors. Only very rare ring sideroblasts are identified on   the Dacie iron stain. These changes   are favored to be regenerative/treatment-related in nature.     Assessment and Plan:     Angel Yanez is a 58 yo man D+300 s/p 2nd autologous transplant for MM with ongoing cytopenias.     1.  BMT/MM:   Slow engraftment; cell dose was only 0.639x10^6. (Marrow [place] - known poor cell dose as failed chemo-mobilization 1/2019.)   - day +180 bone marrow biopsy 11/6/19 which showed no morphologic or immunophenotypic evidence of plasma cell neoplasm. His light chains were not elevated and he had no monoclonal protein in the serum.  He is in clinical remission.  - PET in 8/2019 clear.   - PB FLOW 11/23: No overt aberrant immunophenotype on T cells.  Rare to absent mature B cells and plasma cells.   - Repeat BMBx 3/6/20 due to ongoing cytopenias with dyserythropoiesis which may be regenerative/treatment-related; megakaryocytes present but due to suboptimal specimen cannot determine if they are reduced in numbers. Pending MDS NGS       2.  HEME: Keep Hgb>8g/dL, plt >10k.   - Counts improved today .   -  Last GCSF 3/3/2020, last plt 3/6/2020.   - Persistent thrombocytopenia: BM 11/6/19 showed nearly 0 platelet precursors. Trial of promacta not helpful. Stopped 12/6/19. Progressively worsening in the past few weeks, appears to temporally relate to taper/cessation of  prednisone. Did have a post-transfusion assessment showing response from 2->33 on 2/18/20. Resumed prednisone 2/27 60mg/day, start taper 3/12.  - Discontinue levofloxacin 3/12 as it has been known to cause drug-induced thrombocytopenia  - B12 and folate assessed today due to trending macrocytosis (returned wnl)  - BMBx 3/6/20 without identifiable etiology for cytopenias, pending MDS NGS.  - prior auth for romiplostim approved as of 3/12/20, but will hold off as we start to taper prednisone and in light of his platelet count being 15 today.    3.  ID:   FUOs: Extensive ID work up- no etiology identified. Now afebrile since treated with Pred 30mg qday followed by ~1 month taper. Steroids now resumed for pancytopenia as above (no recurrence of fevers off steroids), tapering steroids as above  - 12/13 EBUS bx with ngtd.- No actual tissue obtained.  - 12/9 RVP=neg  - 12/9 CT chest is stable with GGO better. No antibiotics given during admission because do not feel like fevers are infection.     Prophy: ACV, pentamidine (2/18). Continue fluc as he is also on steroids and intermittent neutropenia. Discontinued levofloxacin due to association with severe thrombocytopenia. If needed, can use other antibacterial prophy.  t cell subset, Absolute SO2=546 assessed 12/10/19  - influenza vaccination given 11/26/19     4. GI:  no complaints. LFTs wnl 2/18     5. CV: Hx of steroid induced HTN. Currently off lisinopril but now back on steroids; Resumed lisinopril 5mg daily 3/3. Increase to 10mg daily 3/6.      6.  FEN/Renal:   Cr and lytes WNL     7.  Mood: Continue Paxil.     8. 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 Resolved. Recurrence 12/9- resolved with Triamcinolone TID.      9. FUOs- Some kind of auto immune process DDX:  Sarcoidosis? Underwent EBUS FNA which was negative for granulomas however unlikely you could see this on just fluid aspirate so would not consider   -  completed pred taper 2/20/2020 as above without recurrence of fevers. Now on steroids for pancytopenia.      10. Psych:  Mood stable on paroxitine     Plan:   -Taper steroids;   Reconsider romplostim if plt counts stay very low.  -Discontinue levofloxacin (has been reported to cause drug inducted thrombocytopenia)  -continue to reassess wisdom of romiplostim trial in the future depending on the trajectory of his thrombocytopenia  -RTC Monday with labs and possible plt transfusion    Labs, imaging and treatment plan reviewed with patient. All questions answered.    In this 40 minutes visit, > 50% spent in counseling and coordinating care.    Patient discussed with Dr. Lucio.    Stan Manning MD ([place]), PhD   Hem/Onc Fellow    Patient has been seen and evaluated by me. I have reviewed today's vital signs, medications, labs and imaging results. I have discussed the plan with the team and agree with the findings and plan in this note.      No new change in low counts but RBC transfusion  needs are infrequent; responds to GCSF promptly when needed; and generally has suitable post transfusion platelet count increment.    No new clues to identify added therapy on BM Bx pending cytogenetics and NGS testing.   No active myeloma    Cont slow pred taper and consider romplostim therapy as next step.  Discussed at length today and questions answered in full  Adebayo Lucio MD            BMT DOM

## 2020-03-16 ENCOUNTER — ONCOLOGY VISIT (OUTPATIENT)
Dept: TRANSPLANT | Facility: CLINIC | Age: 60
End: 2020-03-16
Attending: INTERNAL MEDICINE
Payer: COMMERCIAL

## 2020-03-16 ENCOUNTER — APPOINTMENT (OUTPATIENT)
Dept: LAB | Facility: CLINIC | Age: 60
End: 2020-03-16
Attending: INTERNAL MEDICINE
Payer: COMMERCIAL

## 2020-03-16 VITALS
HEART RATE: 76 BPM | OXYGEN SATURATION: 100 % | TEMPERATURE: 97.5 F | SYSTOLIC BLOOD PRESSURE: 136 MMHG | DIASTOLIC BLOOD PRESSURE: 72 MMHG | BODY MASS INDEX: 25.25 KG/M2 | WEIGHT: 176 LBS | RESPIRATION RATE: 16 BRPM

## 2020-03-16 VITALS
HEART RATE: 72 BPM | TEMPERATURE: 98.4 F | DIASTOLIC BLOOD PRESSURE: 74 MMHG | RESPIRATION RATE: 16 BRPM | SYSTOLIC BLOOD PRESSURE: 119 MMHG | OXYGEN SATURATION: 97 %

## 2020-03-16 DIAGNOSIS — C90.00 MULTIPLE MYELOMA NOT HAVING ACHIEVED REMISSION (H): ICD-10-CM

## 2020-03-16 DIAGNOSIS — D69.6 THROMBOCYTOPENIA (H): ICD-10-CM

## 2020-03-16 DIAGNOSIS — L40.50 PSORIASIS WITH ARTHROPATHY (H): Primary | ICD-10-CM

## 2020-03-16 LAB
ABO + RH BLD: NORMAL
ABO + RH BLD: NORMAL
ALBUMIN SERPL-MCNC: 4.1 G/DL (ref 3.4–5)
ALP SERPL-CCNC: 69 U/L (ref 40–150)
ALT SERPL W P-5'-P-CCNC: 22 U/L (ref 0–70)
ANION GAP SERPL CALCULATED.3IONS-SCNC: 7 MMOL/L (ref 3–14)
ANISOCYTOSIS BLD QL SMEAR: ABNORMAL
AST SERPL W P-5'-P-CCNC: 17 U/L (ref 0–45)
BASOPHILS # BLD AUTO: 0 10E9/L (ref 0–0.2)
BASOPHILS NFR BLD AUTO: 0 %
BILIRUB SERPL-MCNC: 0.5 MG/DL (ref 0.2–1.3)
BLD GP AB SCN SERPL QL: NORMAL
BLD PROD TYP BPU: NORMAL
BLD UNIT ID BPU: 0
BLOOD BANK CMNT PATIENT-IMP: NORMAL
BLOOD PRODUCT CODE: NORMAL
BPU ID: NORMAL
BUN SERPL-MCNC: 17 MG/DL (ref 7–30)
CALCIUM SERPL-MCNC: 9 MG/DL (ref 8.5–10.1)
CHLORIDE SERPL-SCNC: 108 MMOL/L (ref 94–109)
CO2 SERPL-SCNC: 25 MMOL/L (ref 20–32)
COPATH REPORT: NORMAL
CREAT SERPL-MCNC: 0.8 MG/DL (ref 0.66–1.25)
DIFFERENTIAL METHOD BLD: ABNORMAL
EOSINOPHIL # BLD AUTO: 0 10E9/L (ref 0–0.7)
EOSINOPHIL NFR BLD AUTO: 0 %
ERYTHROCYTE [DISTWIDTH] IN BLOOD BY AUTOMATED COUNT: 21.5 % (ref 10–15)
GFR SERPL CREATININE-BSD FRML MDRD: >90 ML/MIN/{1.73_M2}
GLUCOSE SERPL-MCNC: 157 MG/DL (ref 70–99)
HCT VFR BLD AUTO: 28.8 % (ref 40–53)
HGB BLD-MCNC: 9.5 G/DL (ref 13.3–17.7)
IMM GRANULOCYTES # BLD: 0 10E9/L (ref 0–0.4)
IMM GRANULOCYTES NFR BLD: 0.4 %
LYMPHOCYTES # BLD AUTO: 0.5 10E9/L (ref 0.8–5.3)
LYMPHOCYTES NFR BLD AUTO: 18.9 %
MACROCYTES BLD QL SMEAR: PRESENT
MCH RBC QN AUTO: 37.8 PG (ref 26.5–33)
MCHC RBC AUTO-ENTMCNC: 33 G/DL (ref 31.5–36.5)
MCV RBC AUTO: 115 FL (ref 78–100)
MONOCYTES # BLD AUTO: 0.1 10E9/L (ref 0–1.3)
MONOCYTES NFR BLD AUTO: 4.2 %
NEUTROPHILS # BLD AUTO: 2 10E9/L (ref 1.6–8.3)
NEUTROPHILS NFR BLD AUTO: 76.5 %
NRBC # BLD AUTO: 0 10*3/UL
NRBC BLD AUTO-RTO: 0 /100
OVALOCYTES BLD QL SMEAR: SLIGHT
PLATELET # BLD AUTO: 3 10E9/L (ref 150–450)
PLATELET # BLD EST: ABNORMAL 10*3/UL
POIKILOCYTOSIS BLD QL SMEAR: SLIGHT
POTASSIUM SERPL-SCNC: 4.3 MMOL/L (ref 3.4–5.3)
PROT SERPL-MCNC: 7.4 G/DL (ref 6.8–8.8)
RBC # BLD AUTO: 2.51 10E12/L (ref 4.4–5.9)
RETICS # AUTO: 39.5 10E9/L (ref 25–95)
RETICS/RBC NFR AUTO: 1.5 % (ref 0.5–2)
SODIUM SERPL-SCNC: 141 MMOL/L (ref 133–144)
SPECIMEN EXP DATE BLD: NORMAL
TRANSFUSION STATUS PATIENT QL: NORMAL
TRANSFUSION STATUS PATIENT QL: NORMAL
WBC # BLD AUTO: 2.7 10E9/L (ref 4–11)

## 2020-03-16 PROCEDURE — 36430 TRANSFUSION BLD/BLD COMPNT: CPT

## 2020-03-16 PROCEDURE — 85045 AUTOMATED RETICULOCYTE COUNT: CPT | Performed by: INTERNAL MEDICINE

## 2020-03-16 PROCEDURE — 86900 BLOOD TYPING SEROLOGIC ABO: CPT | Performed by: INTERNAL MEDICINE

## 2020-03-16 PROCEDURE — 85025 COMPLETE CBC W/AUTO DIFF WBC: CPT | Performed by: INTERNAL MEDICINE

## 2020-03-16 PROCEDURE — 80053 COMPREHEN METABOLIC PANEL: CPT | Performed by: INTERNAL MEDICINE

## 2020-03-16 PROCEDURE — 86901 BLOOD TYPING SEROLOGIC RH(D): CPT | Performed by: INTERNAL MEDICINE

## 2020-03-16 PROCEDURE — 86850 RBC ANTIBODY SCREEN: CPT | Performed by: INTERNAL MEDICINE

## 2020-03-16 PROCEDURE — P9073 PLATELETS PHERESIS PATH REDU: HCPCS | Performed by: PHYSICIAN ASSISTANT

## 2020-03-16 ASSESSMENT — PAIN SCALES - GENERAL: PAINLEVEL: NO PAIN (0)

## 2020-03-16 NOTE — NURSING NOTE
Chief Complaint   Patient presents with     Blood Draw     Labs drawn via  by RN in lab. VS taken.      María Vidal RN,

## 2020-03-16 NOTE — PROGRESS NOTES
Came for plts; left before being seen;  Cont twice weekly visits.    Discused with pt by phone.  no new symptoms   no external bleeding    RTC Fri visit labs and probable plts.  Pre order    Adebayo Lucio MD

## 2020-03-16 NOTE — PROGRESS NOTES
Infusion Nursing Note:   Angel Yanez presents today for platelet transfusion.    Patient seen by provider today: No   present during visit today: Not Applicable.    Note: Patient arrives for platelet transfusion to keep > 10.  No premedications needed and 1u transfused without incident.  No further replacement needs today.    Intravenous Access:  Peripheral IV placed.    Treatment Conditions:  Lab Results   Component Value Date    HGB 9.5 03/16/2020     Lab Results   Component Value Date    WBC 2.7 03/16/2020      Lab Results   Component Value Date    ANEU 2.0 03/16/2020     Lab Results   Component Value Date    PLT 3 03/16/2020      Results reviewed, labs MET treatment parameters, ok to proceed with treatment.      Post Infusion Assessment:  Patient tolerated infusion without incident.  Blood return noted pre and post infusion.  No evidence of extravasations.  Access discontinued per protocol.       Discharge Plan:   Patient discharged in stable condition accompanied by: self.  Departure Mode: Ambulatory.  Patient unable to wait for provider who is running late.  Will await telephone call with when to return to clinic.    Isamar Tam RN, BMTCN

## 2020-03-17 NOTE — PROGRESS NOTES
Platelets still low;  if not better on Friday March 20; we'll start romplostim injections.    Orders written and insurance approved already.    WYATT Lucio

## 2020-03-18 LAB
ALBUMIN SERPL ELPH-MCNC: 4.6 G/DL (ref 3.7–5.1)
ALPHA1 GLOB SERPL ELPH-MCNC: 0.3 G/DL (ref 0.2–0.4)
ALPHA2 GLOB SERPL ELPH-MCNC: 0.6 G/DL (ref 0.5–0.9)
B-GLOBULIN SERPL ELPH-MCNC: 0.6 G/DL (ref 0.6–1)
GAMMA GLOB SERPL ELPH-MCNC: 0.9 G/DL (ref 0.7–1.6)
M PROTEIN SERPL ELPH-MCNC: 0.1 G/DL
PROT PATTERN SERPL ELPH-IMP: ABNORMAL

## 2020-03-20 ENCOUNTER — INFUSION THERAPY VISIT (OUTPATIENT)
Dept: TRANSPLANT | Facility: CLINIC | Age: 60
End: 2020-03-20
Attending: INTERNAL MEDICINE
Payer: COMMERCIAL

## 2020-03-20 ENCOUNTER — TELEPHONE (OUTPATIENT)
Dept: INFECTIOUS DISEASES | Facility: CLINIC | Age: 60
End: 2020-03-20

## 2020-03-20 ENCOUNTER — APPOINTMENT (OUTPATIENT)
Dept: LAB | Facility: CLINIC | Age: 60
End: 2020-03-20
Attending: INTERNAL MEDICINE
Payer: COMMERCIAL

## 2020-03-20 VITALS
RESPIRATION RATE: 16 BRPM | WEIGHT: 174 LBS | HEART RATE: 81 BPM | TEMPERATURE: 98.5 F | BODY MASS INDEX: 24.97 KG/M2 | DIASTOLIC BLOOD PRESSURE: 82 MMHG | SYSTOLIC BLOOD PRESSURE: 133 MMHG | OXYGEN SATURATION: 100 %

## 2020-03-20 DIAGNOSIS — Z94.81 STATUS POST BONE MARROW TRANSPLANT (H): ICD-10-CM

## 2020-03-20 DIAGNOSIS — C90.00 MULTIPLE MYELOMA NOT HAVING ACHIEVED REMISSION (H): ICD-10-CM

## 2020-03-20 DIAGNOSIS — D69.6 THROMBOCYTOPENIA (H): ICD-10-CM

## 2020-03-20 DIAGNOSIS — L40.50 PSORIASIS WITH ARTHROPATHY (H): Primary | ICD-10-CM

## 2020-03-20 LAB
ALBUMIN SERPL-MCNC: 4 G/DL (ref 3.4–5)
ALP SERPL-CCNC: 64 U/L (ref 40–150)
ALT SERPL W P-5'-P-CCNC: 20 U/L (ref 0–70)
ANION GAP SERPL CALCULATED.3IONS-SCNC: 5 MMOL/L (ref 3–14)
AST SERPL W P-5'-P-CCNC: 16 U/L (ref 0–45)
BASOPHILS # BLD AUTO: 0 10E9/L (ref 0–0.2)
BASOPHILS NFR BLD AUTO: 0 %
BILIRUB SERPL-MCNC: 0.4 MG/DL (ref 0.2–1.3)
BLD PROD TYP BPU: NORMAL
BLD UNIT ID BPU: 0
BLOOD PRODUCT CODE: NORMAL
BPU ID: NORMAL
BUN SERPL-MCNC: 18 MG/DL (ref 7–30)
CALCIUM SERPL-MCNC: 8.7 MG/DL (ref 8.5–10.1)
CHLORIDE SERPL-SCNC: 108 MMOL/L (ref 94–109)
CO2 SERPL-SCNC: 26 MMOL/L (ref 20–32)
COPATH REPORT: NORMAL
CREAT SERPL-MCNC: 0.82 MG/DL (ref 0.66–1.25)
DIFFERENTIAL METHOD BLD: ABNORMAL
EOSINOPHIL # BLD AUTO: 0 10E9/L (ref 0–0.7)
EOSINOPHIL NFR BLD AUTO: 0 %
ERYTHROCYTE [DISTWIDTH] IN BLOOD BY AUTOMATED COUNT: 21.2 % (ref 10–15)
GFR SERPL CREATININE-BSD FRML MDRD: >90 ML/MIN/{1.73_M2}
GLUCOSE SERPL-MCNC: 153 MG/DL (ref 70–99)
HCT VFR BLD AUTO: 28.4 % (ref 40–53)
HGB BLD-MCNC: 9.3 G/DL (ref 13.3–17.7)
IMM GRANULOCYTES # BLD: 0 10E9/L (ref 0–0.4)
IMM GRANULOCYTES NFR BLD: 0.9 %
LYMPHOCYTES # BLD AUTO: 0.5 10E9/L (ref 0.8–5.3)
LYMPHOCYTES NFR BLD AUTO: 21.5 %
MCH RBC QN AUTO: 38 PG (ref 26.5–33)
MCHC RBC AUTO-ENTMCNC: 32.7 G/DL (ref 31.5–36.5)
MCV RBC AUTO: 116 FL (ref 78–100)
MONOCYTES # BLD AUTO: 0.1 10E9/L (ref 0–1.3)
MONOCYTES NFR BLD AUTO: 4.3 %
NEUTROPHILS # BLD AUTO: 1.7 10E9/L (ref 1.6–8.3)
NEUTROPHILS NFR BLD AUTO: 73.3 %
NRBC # BLD AUTO: 0 10*3/UL
NRBC BLD AUTO-RTO: 0 /100
PLATELET # BLD AUTO: 7 10E9/L (ref 150–450)
POTASSIUM SERPL-SCNC: 4.4 MMOL/L (ref 3.4–5.3)
PROT SERPL-MCNC: 7.2 G/DL (ref 6.8–8.8)
RBC # BLD AUTO: 2.45 10E12/L (ref 4.4–5.9)
SODIUM SERPL-SCNC: 140 MMOL/L (ref 133–144)
TRANSFUSION STATUS PATIENT QL: NORMAL
TRANSFUSION STATUS PATIENT QL: NORMAL
WBC # BLD AUTO: 2.3 10E9/L (ref 4–11)

## 2020-03-20 PROCEDURE — 36430 TRANSFUSION BLD/BLD COMPNT: CPT

## 2020-03-20 PROCEDURE — P9037 PLATE PHERES LEUKOREDU IRRAD: HCPCS | Performed by: PHYSICIAN ASSISTANT

## 2020-03-20 PROCEDURE — 25000128 H RX IP 250 OP 636: Mod: ZF | Performed by: INTERNAL MEDICINE

## 2020-03-20 PROCEDURE — 85025 COMPLETE CBC W/AUTO DIFF WBC: CPT | Performed by: INTERNAL MEDICINE

## 2020-03-20 PROCEDURE — 96372 THER/PROPH/DIAG INJ SC/IM: CPT

## 2020-03-20 PROCEDURE — 80053 COMPREHEN METABOLIC PANEL: CPT | Performed by: INTERNAL MEDICINE

## 2020-03-20 RX ADMIN — ROMIPLOSTIM 80 MCG: 125 INJECTION, POWDER, LYOPHILIZED, FOR SOLUTION SUBCUTANEOUS at 14:37

## 2020-03-20 ASSESSMENT — PAIN SCALES - GENERAL: PAINLEVEL: NO PAIN (0)

## 2020-03-20 NOTE — NURSING NOTE
Chief Complaint   Patient presents with     Blood Draw     Labs drawn via /PIV placed by RN in lab. VS taken.     RECHECK     Pt here for one dose of Plts and NPlate SQ Injection. Pt is s/p BMT for Multiple Myeloma.      Radha Coker RN

## 2020-03-20 NOTE — PROGRESS NOTES
Infusion Nursing Note:  Angel Yanez presents today for one dose of Plts and NPlate subcutaneous Injection. See MAR.     Patient seen by provider today: No   present during visit today: Not Applicable.    Note: Pt with a Plt count of 7K today. Pt started Day 1 of NPlate subcutaneous injections (to be given weekly). Pt also received one dose of Plts transfused without Premeds and tolerated them well. VSS throughout. See Doc Flowsheet for further details.      Intravenous Access:  Peripheral IV placed.    Treatment Conditions:  Lab Results   Component Value Date    HGB 9.3 03/20/2020     Lab Results   Component Value Date    WBC 2.3 03/20/2020      Lab Results   Component Value Date    ANEU 1.7 03/20/2020     Lab Results   Component Value Date    PLT 7 03/20/2020          Post Infusion Assessment:  Patient tolerated infusion without incident.  Patient tolerated injection without incident.       Discharge Plan:   Patient discharged in stable condition accompanied by: self.  Departure Mode: Ambulatory.    Radha Coker RN

## 2020-03-20 NOTE — NURSING NOTE
Chief Complaint   Patient presents with     Blood Draw     Labs drawn via /PIV placed by RN in lab. VS taken.     Clau Sibley RN     Patients father arrives, yelling at staff. Very unpleasant.

## 2020-03-21 DIAGNOSIS — Z94.81 STATUS POST BONE MARROW TRANSPLANT (H): ICD-10-CM

## 2020-03-21 DIAGNOSIS — C90.00 MULTIPLE MYELOMA NOT HAVING ACHIEVED REMISSION (H): Primary | ICD-10-CM

## 2020-03-21 DIAGNOSIS — D69.6 THROMBOCYTOPENIA (H): ICD-10-CM

## 2020-03-23 LAB — COPATH REPORT: NORMAL

## 2020-03-25 DIAGNOSIS — Z94.81 STATUS POST BONE MARROW TRANSPLANT (H): ICD-10-CM

## 2020-03-25 DIAGNOSIS — D69.6 THROMBOCYTOPENIA (H): Primary | ICD-10-CM

## 2020-03-26 ENCOUNTER — APPOINTMENT (OUTPATIENT)
Dept: LAB | Facility: CLINIC | Age: 60
End: 2020-03-26
Attending: INTERNAL MEDICINE
Payer: COMMERCIAL

## 2020-03-26 ENCOUNTER — ONCOLOGY VISIT (OUTPATIENT)
Dept: TRANSPLANT | Facility: CLINIC | Age: 60
End: 2020-03-26
Attending: INTERNAL MEDICINE
Payer: COMMERCIAL

## 2020-03-26 VITALS
BODY MASS INDEX: 24.89 KG/M2 | RESPIRATION RATE: 16 BRPM | HEART RATE: 74 BPM | TEMPERATURE: 97.9 F | OXYGEN SATURATION: 98 % | WEIGHT: 173.5 LBS | DIASTOLIC BLOOD PRESSURE: 83 MMHG | SYSTOLIC BLOOD PRESSURE: 134 MMHG

## 2020-03-26 DIAGNOSIS — Z94.81 STATUS POST BONE MARROW TRANSPLANT (H): ICD-10-CM

## 2020-03-26 DIAGNOSIS — L40.50 PSORIASIS WITH ARTHROPATHY (H): ICD-10-CM

## 2020-03-26 DIAGNOSIS — C90.00 MULTIPLE MYELOMA NOT HAVING ACHIEVED REMISSION (H): Primary | ICD-10-CM

## 2020-03-26 DIAGNOSIS — D69.6 THROMBOCYTOPENIA (H): ICD-10-CM

## 2020-03-26 DIAGNOSIS — C90.01 MULTIPLE MYELOMA IN REMISSION (H): ICD-10-CM

## 2020-03-26 LAB
ALBUMIN SERPL-MCNC: 3.9 G/DL (ref 3.4–5)
ALP SERPL-CCNC: 60 U/L (ref 40–150)
ALT SERPL W P-5'-P-CCNC: 24 U/L (ref 0–70)
ANION GAP SERPL CALCULATED.3IONS-SCNC: 6 MMOL/L (ref 3–14)
AST SERPL W P-5'-P-CCNC: 13 U/L (ref 0–45)
BASOPHILS # BLD AUTO: 0 10E9/L (ref 0–0.2)
BASOPHILS NFR BLD AUTO: 0.4 %
BILIRUB SERPL-MCNC: 0.4 MG/DL (ref 0.2–1.3)
BLD PROD TYP BPU: NORMAL
BLD UNIT ID BPU: 0
BLOOD PRODUCT CODE: NORMAL
BPU ID: NORMAL
BUN SERPL-MCNC: 18 MG/DL (ref 7–30)
CALCIUM SERPL-MCNC: 9.1 MG/DL (ref 8.5–10.1)
CHLORIDE SERPL-SCNC: 108 MMOL/L (ref 94–109)
CO2 SERPL-SCNC: 28 MMOL/L (ref 20–32)
CREAT SERPL-MCNC: 0.89 MG/DL (ref 0.66–1.25)
DIFFERENTIAL METHOD BLD: ABNORMAL
EOSINOPHIL # BLD AUTO: 0 10E9/L (ref 0–0.7)
EOSINOPHIL NFR BLD AUTO: 0.4 %
ERYTHROCYTE [DISTWIDTH] IN BLOOD BY AUTOMATED COUNT: 20.3 % (ref 10–15)
GFR SERPL CREATININE-BSD FRML MDRD: >90 ML/MIN/{1.73_M2}
GLUCOSE SERPL-MCNC: 92 MG/DL (ref 70–99)
HCT VFR BLD AUTO: 28 % (ref 40–53)
HGB BLD-MCNC: 9.3 G/DL (ref 13.3–17.7)
IMM GRANULOCYTES # BLD: 0 10E9/L (ref 0–0.4)
IMM GRANULOCYTES NFR BLD: 0.4 %
LYMPHOCYTES # BLD AUTO: 0.6 10E9/L (ref 0.8–5.3)
LYMPHOCYTES NFR BLD AUTO: 23 %
MCH RBC QN AUTO: 38.4 PG (ref 26.5–33)
MCHC RBC AUTO-ENTMCNC: 33.2 G/DL (ref 31.5–36.5)
MCV RBC AUTO: 116 FL (ref 78–100)
MONOCYTES # BLD AUTO: 0.3 10E9/L (ref 0–1.3)
MONOCYTES NFR BLD AUTO: 12.1 %
NEUTROPHILS # BLD AUTO: 1.6 10E9/L (ref 1.6–8.3)
NEUTROPHILS NFR BLD AUTO: 63.7 %
NRBC # BLD AUTO: 0 10*3/UL
NRBC BLD AUTO-RTO: 0 /100
PLATELET # BLD AUTO: 5 10E9/L (ref 150–450)
POTASSIUM SERPL-SCNC: 4 MMOL/L (ref 3.4–5.3)
PROT SERPL-MCNC: 6.9 G/DL (ref 6.8–8.8)
RBC # BLD AUTO: 2.42 10E12/L (ref 4.4–5.9)
RETICS # AUTO: 47.4 10E9/L (ref 25–95)
RETICS/RBC NFR AUTO: 2 % (ref 0.5–2)
SODIUM SERPL-SCNC: 141 MMOL/L (ref 133–144)
TRANSFUSION STATUS PATIENT QL: NORMAL
TRANSFUSION STATUS PATIENT QL: NORMAL
WBC # BLD AUTO: 2.5 10E9/L (ref 4–11)

## 2020-03-26 PROCEDURE — 85025 COMPLETE CBC W/AUTO DIFF WBC: CPT | Performed by: INTERNAL MEDICINE

## 2020-03-26 PROCEDURE — 25000128 H RX IP 250 OP 636: Mod: ZF | Performed by: PHYSICIAN ASSISTANT

## 2020-03-26 PROCEDURE — 94642 AEROSOL INHALATION TREATMENT: CPT

## 2020-03-26 PROCEDURE — 96372 THER/PROPH/DIAG INJ SC/IM: CPT

## 2020-03-26 PROCEDURE — 85045 AUTOMATED RETICULOCYTE COUNT: CPT | Performed by: INTERNAL MEDICINE

## 2020-03-26 PROCEDURE — 36430 TRANSFUSION BLD/BLD COMPNT: CPT

## 2020-03-26 PROCEDURE — 25000125 ZZHC RX 250: Mod: ZF | Performed by: PHYSICIAN ASSISTANT

## 2020-03-26 PROCEDURE — P9037 PLATE PHERES LEUKOREDU IRRAD: HCPCS | Performed by: PHYSICIAN ASSISTANT

## 2020-03-26 PROCEDURE — 80053 COMPREHEN METABOLIC PANEL: CPT | Performed by: INTERNAL MEDICINE

## 2020-03-26 RX ORDER — ALBUTEROL SULFATE 5 MG/ML
2.5 SOLUTION RESPIRATORY (INHALATION)
Status: CANCELLED
Start: 2020-04-18

## 2020-03-26 RX ORDER — ALBUTEROL SULFATE 0.83 MG/ML
2.5 SOLUTION RESPIRATORY (INHALATION)
Status: DISCONTINUED | OUTPATIENT
Start: 2020-03-26 | End: 2020-03-26 | Stop reason: HOSPADM

## 2020-03-26 RX ORDER — PENTAMIDINE ISETHIONATE 300 MG/300MG
300 INHALANT RESPIRATORY (INHALATION)
Status: CANCELLED
Start: 2020-04-18

## 2020-03-26 RX ORDER — HEPARIN SODIUM,PORCINE 10 UNIT/ML
5 VIAL (ML) INTRAVENOUS
Status: CANCELLED | OUTPATIENT
Start: 2020-04-18

## 2020-03-26 RX ADMIN — ROMIPLOSTIM 160 MCG: 250 INJECTION, POWDER, LYOPHILIZED, FOR SOLUTION SUBCUTANEOUS at 08:57

## 2020-03-26 RX ADMIN — PENTAMIDINE ISETHIONATE 300 MG: 300 INJECTION, POWDER, LYOPHILIZED, FOR SOLUTION INTRAMUSCULAR; INTRAVENOUS at 08:39

## 2020-03-26 RX ADMIN — ALBUTEROL SULFATE 2.5 MG: 2.5 SOLUTION RESPIRATORY (INHALATION) at 08:28

## 2020-03-26 ASSESSMENT — PAIN SCALES - GENERAL: PAINLEVEL: NO PAIN (0)

## 2020-03-26 NOTE — NURSING NOTE
Chief Complaint   Patient presents with     Lab Only     venipuncture, vitals checked     PIV placed for infusion    Eloisa King RN on 3/26/2020 at 7:59 AM

## 2020-03-26 NOTE — PROGRESS NOTES
Infusion Nursing Note:  Angel Yanez presents today for scheduled injection, inhalation and add-on transfusion.    Patient seen by provider today: No   present during visit today: Not Applicable.    Note: Labs were monitored.    Intravenous Access:  Peripheral IV placed.    Treatment Conditions:  Patient received scheduled NPlate injection in his left arm.  He received scheduled Pentamidine inhalation treatment.  Patient received an add-on platelet transfusion for a platelet count of 5.      Post Infusion Assessment:  Patient tolerated injection, inhalation and transfusion without incident.       Discharge Plan:   Patient discharged in stable condition accompanied by: self.    CLARA ELLSWORTH RN

## 2020-03-26 NOTE — PROGRESS NOTES
BMT Clinic Progress Note    Patient ID: Angel Yanez is a 60 yo man D+294 s/p 2nd autologous transplant for MM.   S/p 2 readmissions for FUO (10/16-10/23; 9/23-9/29/19). Readmitted on 12/9/2019 with return of fevers despite celebrex, FARZANA, worsening sob and return of rash. Now with ongoing thrombocytopenia.     Interval history:     Add on visit when pt was here in infusion for weekly plts. Notes that he was started on steroids at then end of February for low counts- he started 60mg of pred daily. Has now tapered down to 40mg pred daily. No change in counts that he can tell, but wondering what the plan his for his steroids. He has afebrile. Actually feeling well. Just remains frustrated by low counts. Eating and drinking well.     ROS: 7 point ROS neg unless specified above.       Physical Exam:   There were no vitals taken for this visit. - Reviewed in flowsheets,  Wt Readings from Last 4 Encounters:   03/26/20 78.7 kg (173 lb 8 oz)   03/20/20 78.9 kg (174 lb)   03/16/20 79.8 kg (176 lb)   03/12/20 79.7 kg (175 lb 12.8 oz)     General: NAD, engaged and talkative.   ENT: oral mucosa moist without ulceration, No OP erythema or exudate  Lungs: CTAB, no w/r/r, nonlabored breathing  Cardiovascular: RRR, S1, S2, no m/r/g  GI/Abdomen: +BS, soft, nontender, non-distended  Skin: bruise on right forearm from previous IV. No other rashes or petechiae.   MSK/Extremities: Warm, well perfused.  Neurologic: alert, and conversant    Data:     Lab Results   Component Value Date    WBC 2.5 (L) 03/26/2020    ANEU 1.6 03/26/2020    HGB 9.3 (L) 03/26/2020    HCT 28.0 (L) 03/26/2020    PLT 5 (LL) 03/26/2020     03/26/2020    POTASSIUM 4.0 03/26/2020    CHLORIDE 108 03/26/2020    CO2 28 03/26/2020    GLC 92 03/26/2020    BUN 18 03/26/2020    CR 0.89 03/26/2020    MAG 2.0 03/03/2020    INR 1.01 03/12/2020    BILITOTAL 0.4 03/26/2020    AST 13 03/26/2020    ALT 24 03/26/2020    ALKPHOS 60 03/26/2020    PROTTOTAL 6.9 03/26/2020     ALBUMIN 3.9 03/26/2020       I have assessed all abnormal lab values for their clinical significance and any values considered clinically significant have been addressed in the assessment and plan.      Assessment and Plan:     Angel Yanez is a 60 yo man D+314 s/p 2nd autologous transplant for MM with ongoing cytopenias.     1.  BMT/MM:   Slow engraftment; cell dose was only 0.639x10^6. (Marrow [place] - known poor cell dose as failed chemo-mobilization 1/2019.)   - day +180 bone marrow biopsy 11/6/19 which showed no morphologic or immunophenotypic evidence of plasma cell neoplasm. His light chains were not elevated and he had no monoclonal protein in the serum.  He is in clinical remission.  - PET in 8/2019 clear.   - PB FLOW 11/23: No overt aberrant immunophenotype on T cells.  Rare to absent mature B cells and plasma cells.   - BMBx today.      2.  HEME: Keep Hgb>8g/dL, plt >10k.   - WBC is up. However no improvement in plts. Started Nplate 3/19. Increase 2mcg/kg. Likely increase to 5mcg/kg next week if still single digit plts then increase by 1 mcg/kg weekly to max dose of 10mcg/kg.   -  Last GCSF 3/3/2020.   - Persistent thrombocytopenia: BM 11/6/19 showed nearly 0 platelet precursors. Trial of promacta not helpful. Stopped 12/6/19. Progressively worsening in the past few weeks, appears to temporally relate to taper/cessation of prednisone. Did have a post-transfusion assessment showing response from 2->33 on 2/18/20   - Plan for repeat BMBx as above  - Trial of prednisone- failed to improve plts- plan to taper off (20mg daily 3/26-4/1, 10mg daily 4/2-4/7, 4/8-4/14 5mg daily then stop.     3.  ID: Afebrile but has largely been on steroids - monitor with stopped steriods for recurrent febrile episodes.   FUOs: Extensive ID work up- no etiology identified. Now afebrile since treated with Pred 30mg qday followed by ~1 month taper. Steroids now resumed for pancytopenia as above (no recurrence of fevers off  steroids).   - 12/13 EBUS bx with ngtd.- No actual tissue obtained.  - 12/9 RVP=neg  - 12/9 CT chest is stable with GGO better. No antibiotics given during admission because do not feel like fevers are infection.     Prophy: ACV, pentamidine (2/18). Levo/fluc stopped now off pred. Resumed with intermittent neutropenia. Continue levo/fluc as he is also on steroids.   - Plan to start LEvaquin 4/15 if remains not neutropenic.   t cell subset, Absolute [id number]=772 assessed 12/10/19  - influenza vaccination given 11/26/19     4. GI:  no complaints. LFTs wnl 2/18     5. CV: Hx of steroid induced HTN. Currently off lisinopril but now back on steroids; Resumed lisinopril 5mg daily 3/3. Increase to 10mg daily 3/6.      6.  FEN/Renal:   Cr and lytes WNL     7.  Mood: Continue Paxil.     8. Derm:   - Hx of rash and skin bx 11/15 consistent with chemotheapy-induced [place]'s disease. Resolved. Recurrence 12/9- resolved with Triamcinolone TID.      9. FUOs- Some kind of auto immune process DDX:  Sarcoidosis? Underwent EBUS FNA which was negative for granulomas however unlikely you could see this on just fluid aspirate so would not consider   - completed pred taper 2/20/2020 as above without recurrence of fevers. Now on steroids for pancytopenia.      10. Psych:  Mood stable on paroxitine       Plan: plts, RBCs, gcsf, and schedule bmbx    RTC weekly for labs, plts and possible gcsf  Given pentamidine and plts today.   Given pred taper to come off in next 3 weeks.     Myrna Marrufo PA-C  [phone]

## 2020-03-26 NOTE — NURSING NOTE
"Oncology Rooming Note    March 26, 2020 8:49 AM   Angel Yanez is a 59 year old male who presents for:    Chief Complaint   Patient presents with     Lab Only     venipuncture, vitals checked     Infusion     scheduled injection, inhalation, add-on transfusion s/p bmt txp for multiple myeloma     Initial Vitals: /83   Pulse 77   Temp 97.9  F (36.6  C)   Resp 18   Wt 78.7 kg (173 lb 8 oz)   SpO2 99%   BMI 24.89 kg/m   Estimated body mass index is 24.89 kg/m  as calculated from the following:    Height as of 2/4/20: 1.778 m (5' 10\").    Weight as of this encounter: 78.7 kg (173 lb 8 oz). Body surface area is 1.97 meters squared.  No Pain (0) Comment: Data Unavailable   No LMP for male patient.  Allergies reviewed: Yes  Medications reviewed: Yes    Medications: Medication refills not needed today.  Pharmacy name entered into EPIC:    [facility] - [place], [place] - [address] [facility] - [place], [place] - [address] [facility]    Clinical concerns: Patient denies fevers/N/V/D.  He denies any pain/discomfort today.    Patient had questions on his prednisone taper, Provider SHANI was consulted and she addressed with Patient.    CLARA ELLSWORTH RN              "

## 2020-03-30 ENCOUNTER — INFUSION THERAPY VISIT (OUTPATIENT)
Dept: TRANSPLANT | Facility: CLINIC | Age: 60
End: 2020-03-30
Attending: PHYSICIAN ASSISTANT
Payer: COMMERCIAL

## 2020-03-30 ENCOUNTER — APPOINTMENT (OUTPATIENT)
Dept: LAB | Facility: CLINIC | Age: 60
End: 2020-03-30
Attending: PHYSICIAN ASSISTANT
Payer: COMMERCIAL

## 2020-03-30 VITALS
RESPIRATION RATE: 16 BRPM | BODY MASS INDEX: 25 KG/M2 | TEMPERATURE: 98.7 F | HEART RATE: 62 BPM | SYSTOLIC BLOOD PRESSURE: 131 MMHG | WEIGHT: 174.2 LBS | OXYGEN SATURATION: 99 % | DIASTOLIC BLOOD PRESSURE: 86 MMHG

## 2020-03-30 DIAGNOSIS — I15.9 SECONDARY HYPERTENSION: ICD-10-CM

## 2020-03-30 DIAGNOSIS — L40.50 PSORIASIS WITH ARTHROPATHY (H): Primary | ICD-10-CM

## 2020-03-30 LAB
BASOPHILS # BLD AUTO: 0 10E9/L (ref 0–0.2)
BASOPHILS NFR BLD AUTO: 0.3 %
BLD PROD TYP BPU: NORMAL
BLD UNIT ID BPU: 0
BLOOD PRODUCT CODE: NORMAL
BPU ID: NORMAL
DIFFERENTIAL METHOD BLD: ABNORMAL
EOSINOPHIL # BLD AUTO: 0 10E9/L (ref 0–0.7)
EOSINOPHIL NFR BLD AUTO: 0.6 %
ERYTHROCYTE [DISTWIDTH] IN BLOOD BY AUTOMATED COUNT: 19.6 % (ref 10–15)
HCT VFR BLD AUTO: 30.2 % (ref 40–53)
HGB BLD-MCNC: 9.9 G/DL (ref 13.3–17.7)
IMM GRANULOCYTES # BLD: 0 10E9/L (ref 0–0.4)
IMM GRANULOCYTES NFR BLD: 0.3 %
LYMPHOCYTES # BLD AUTO: 0.5 10E9/L (ref 0.8–5.3)
LYMPHOCYTES NFR BLD AUTO: 16.2 %
MCH RBC QN AUTO: 38.2 PG (ref 26.5–33)
MCHC RBC AUTO-ENTMCNC: 32.8 G/DL (ref 31.5–36.5)
MCV RBC AUTO: 117 FL (ref 78–100)
MONOCYTES # BLD AUTO: 0.3 10E9/L (ref 0–1.3)
MONOCYTES NFR BLD AUTO: 9.3 %
NEUTROPHILS # BLD AUTO: 2.4 10E9/L (ref 1.6–8.3)
NEUTROPHILS NFR BLD AUTO: 73.3 %
NRBC # BLD AUTO: 0 10*3/UL
NRBC BLD AUTO-RTO: 0 /100
PLATELET # BLD AUTO: 4 10E9/L (ref 150–450)
PLATELET # BLD EST: ABNORMAL 10*3/UL
RBC # BLD AUTO: 2.59 10E12/L (ref 4.4–5.9)
TRANSFUSION STATUS PATIENT QL: NORMAL
TRANSFUSION STATUS PATIENT QL: NORMAL
WBC # BLD AUTO: 3.3 10E9/L (ref 4–11)

## 2020-03-30 PROCEDURE — 85025 COMPLETE CBC W/AUTO DIFF WBC: CPT | Performed by: PHYSICIAN ASSISTANT

## 2020-03-30 PROCEDURE — P9037 PLATE PHERES LEUKOREDU IRRAD: HCPCS | Performed by: PHYSICIAN ASSISTANT

## 2020-03-30 PROCEDURE — 36430 TRANSFUSION BLD/BLD COMPNT: CPT

## 2020-03-30 RX ORDER — LISINOPRIL 10 MG/1
10 TABLET ORAL DAILY
Qty: 90 TABLET | Refills: 4 | Status: SHIPPED | OUTPATIENT
Start: 2020-03-30 | End: 2020-04-17

## 2020-03-30 ASSESSMENT — PAIN SCALES - GENERAL: PAINLEVEL: NO PAIN (0)

## 2020-03-30 NOTE — PROGRESS NOTES
Infusion Nursing Note:  Angelravinder Oleaer presents today for plts.    Patient seen by provider today: No   present during visit today: Not Applicable.    Note: Pt received 1u plts.  Tolerated without incident.    Intravenous Access:  Peripheral IV placed.    Treatment Conditions:  Lab Results   Component Value Date    HGB 9.9 03/30/2020     Lab Results   Component Value Date    WBC 3.3 03/30/2020      Lab Results   Component Value Date    ANEU 2.4 03/30/2020     Lab Results   Component Value Date    PLT 4 03/30/2020      Results reviewed, labs MET treatment parameters, ok to proceed with treatment.      Post Infusion Assessment:  Patient tolerated infusion without incident.  Blood return noted pre and post infusion.  Site patent and intact, free from redness, edema or discomfort.  No evidence of extravasations.  Access discontinued per protocol.       Discharge Plan:   Discharge instructions reviewed with: Patient.  Patient and/or family verbalized understanding of discharge instructions and all questions answered.  Patient discharged in stable condition accompanied by: self.  Departure Mode: Ambulatory.    Rosana Brown RN

## 2020-04-02 NOTE — PROGRESS NOTES
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     Interval history:     Guille is feeling good. Went for a 13 mile bike ride yesterday & his legs feel weak. Excited with improved plt count today.  Afebrile with no cough or congestion.  Eating with no N/V/D.  No pain, bleeding or rash.     ROS: 8 point ROS neg unless specified above.       Physical Exam:   Blood pressure 133/81, pulse 82, temperature 98  F (36.7  C), temperature source Oral, resp. rate 16, weight 78.9 kg (174 lb), SpO2 98 %. -   General: NAD, engaged and talkative.   ENT: oral mucosa moist without ulceration, No OP erythema or exudate  Lungs: CTAB, no w/r/r, nonlabored breathing  Cardiovascular: RRR, S1, S2, no m/r/g  GI/Abdomen: +BS, soft, nontender, non-distended  Skin: No rash or petechiae.   MSK/Extremities: Warm, well perfused.  Neurologic: alert, and conversant    Data:     Lab Results   Component Value Date    WBC 2.6 (L) 04/03/2020    ANEU 1.4 (L) 04/03/2020    HGB 9.8 (L) 04/03/2020    HCT 29.0 (L) 04/03/2020    PLT 17 (LL) 04/03/2020     03/26/2020    POTASSIUM 4.0 03/26/2020    CHLORIDE 108 03/26/2020    CO2 28 03/26/2020    GLC 92 03/26/2020    BUN 18 03/26/2020    CR 0.89 03/26/2020    MAG 2.0 03/03/2020    INR 1.01 03/12/2020    BILITOTAL 0.4 03/26/2020    AST 13 03/26/2020    ALT 24 03/26/2020    ALKPHOS 60 03/26/2020    PROTTOTAL 6.9 03/26/2020    ALBUMIN 3.9 03/26/2020       Assessment and Plan:     Angel Yanez is a 58 yo man D+322 s/p 2nd autologous transplant for MM with ongoing cytopenias.     1.  BMT/MM:   Slow engraftment; cell dose was only 0.639x10^6. (Marrow [place] - known poor cell dose as failed chemo-mobilization 1/2019.)   - day +180 bone marrow biopsy 11/6/19 which showed no morphologic or immunophenotypic evidence of plasma cell neoplasm. His light chains were not elevated and he had no monoclonal protein in the serum.  He is in clinical  remission.  - PET in 8/2019 clear.   - PB FLOW 11/23: No overt aberrant immunophenotype on T cells.  Rare to absent mature B cells and plasma cells.    - BMBX 3/6:  No morphologic or immunophenotypic evidence of recurrent/persistent plasma cell neoplasm. Cellular marrow with trilineage hematopoiesis, dyserythropoiesis, and no increase in blasts.  No increase in ring sideroblasts.  Less than 5% polytypic plasma cells .  Peripheral blood showing marked macrocytic anemia, lymphocytopenia, and marked thrombocytopenia     2.  HEME: Keep Hgb>8g/dL, plt >10k.   - WBC stable.  Plt trending up.  Started Nplate 3/19. Increased to 2mcg/kg last week & will give 5mcg/kg today (max dose of 10mcg/kg).   -  Last GCSF 3/3/2020.   - Persistent thrombocytopenia: previous trial of promacta not helpful. Stopped 12/6/19. Trial of prednisone not helpful, currently on a Pred taper down to 10mg every day & complete 4/14     3.  ID: Afebrile but has largely been on steroids  - FUOs: Extensive ID work up- no etiology identified. Now afebrile since treated with Pred     Prophy: ACV, Fluconazole & pentamidine (3/26). Not currently on Levaquin  - t cell subset, Absolute GO2=199 assessed 12/10/19  - influenza vaccination given 11/26/19     4. GI:  no complaints.      5. CV: Hx of steroid induced HTN. With steroid taper will dose reduce Lisinopril to 5mg dialy     6.  FEN/Renal: Cr and lytes WNL     7.  Mood: Continue Paxil.     8. FUOs- Some kind of auto immune process DDX:  Sarcoidosis? Underwent EBUS FNA which was negative for granulomas however unlikely you could see this on just fluid aspirate so would not consider   - completed pred taper 2/20/2020 as above without recurrence of fevers. Now on steroids for pancytopenia.      RTC weekly for lab & Nplate    Karla Manzo

## 2020-04-03 ENCOUNTER — APPOINTMENT (OUTPATIENT)
Dept: LAB | Facility: CLINIC | Age: 60
End: 2020-04-03
Attending: NURSE PRACTITIONER
Payer: COMMERCIAL

## 2020-04-03 ENCOUNTER — ONCOLOGY VISIT (OUTPATIENT)
Dept: TRANSPLANT | Facility: CLINIC | Age: 60
End: 2020-04-03
Attending: NURSE PRACTITIONER
Payer: COMMERCIAL

## 2020-04-03 VITALS
OXYGEN SATURATION: 98 % | DIASTOLIC BLOOD PRESSURE: 81 MMHG | HEART RATE: 82 BPM | TEMPERATURE: 98 F | RESPIRATION RATE: 16 BRPM | BODY MASS INDEX: 24.97 KG/M2 | SYSTOLIC BLOOD PRESSURE: 133 MMHG | WEIGHT: 174 LBS

## 2020-04-03 DIAGNOSIS — C90.00 MULTIPLE MYELOMA NOT HAVING ACHIEVED REMISSION (H): ICD-10-CM

## 2020-04-03 DIAGNOSIS — Z94.81 STATUS POST BONE MARROW TRANSPLANT (H): ICD-10-CM

## 2020-04-03 DIAGNOSIS — C90.01 MULTIPLE MYELOMA IN REMISSION (H): ICD-10-CM

## 2020-04-03 DIAGNOSIS — D69.6 THROMBOCYTOPENIA (H): Primary | ICD-10-CM

## 2020-04-03 DIAGNOSIS — L40.50 PSORIASIS WITH ARTHROPATHY (H): ICD-10-CM

## 2020-04-03 LAB
BASOPHILS # BLD AUTO: 0 10E9/L (ref 0–0.2)
BASOPHILS NFR BLD AUTO: 0.8 %
BLD PROD TYP BPU: NORMAL
BLD UNIT ID BPU: 0
BLOOD PRODUCT CODE: NORMAL
BPU ID: NORMAL
DIFFERENTIAL METHOD BLD: ABNORMAL
EOSINOPHIL # BLD AUTO: 0 10E9/L (ref 0–0.7)
EOSINOPHIL NFR BLD AUTO: 0.8 %
ERYTHROCYTE [DISTWIDTH] IN BLOOD BY AUTOMATED COUNT: 19.1 % (ref 10–15)
HCT VFR BLD AUTO: 29 % (ref 40–53)
HGB BLD-MCNC: 9.8 G/DL (ref 13.3–17.7)
IMM GRANULOCYTES # BLD: 0 10E9/L (ref 0–0.4)
IMM GRANULOCYTES NFR BLD: 0.4 %
LYMPHOCYTES # BLD AUTO: 0.8 10E9/L (ref 0.8–5.3)
LYMPHOCYTES NFR BLD AUTO: 30.3 %
MCH RBC QN AUTO: 38.6 PG (ref 26.5–33)
MCHC RBC AUTO-ENTMCNC: 33.8 G/DL (ref 31.5–36.5)
MCV RBC AUTO: 114 FL (ref 78–100)
MONOCYTES # BLD AUTO: 0.4 10E9/L (ref 0–1.3)
MONOCYTES NFR BLD AUTO: 13.4 %
NEUTROPHILS # BLD AUTO: 1.4 10E9/L (ref 1.6–8.3)
NEUTROPHILS NFR BLD AUTO: 54.3 %
NRBC # BLD AUTO: 0 10*3/UL
NRBC BLD AUTO-RTO: 0 /100
PLATELET # BLD AUTO: 17 10E9/L (ref 150–450)
RBC # BLD AUTO: 2.54 10E12/L (ref 4.4–5.9)
TRANSFUSION STATUS PATIENT QL: NORMAL
TRANSFUSION STATUS PATIENT QL: NORMAL
WBC # BLD AUTO: 2.6 10E9/L (ref 4–11)

## 2020-04-03 PROCEDURE — 36415 COLL VENOUS BLD VENIPUNCTURE: CPT

## 2020-04-03 PROCEDURE — 85025 COMPLETE CBC W/AUTO DIFF WBC: CPT | Performed by: INTERNAL MEDICINE

## 2020-04-03 PROCEDURE — 25000128 H RX IP 250 OP 636: Mod: JW,ZF | Performed by: NURSE PRACTITIONER

## 2020-04-03 PROCEDURE — G0463 HOSPITAL OUTPT CLINIC VISIT: HCPCS | Mod: 25

## 2020-04-03 PROCEDURE — 96372 THER/PROPH/DIAG INJ SC/IM: CPT

## 2020-04-03 RX ORDER — LISINOPRIL 5 MG/1
5 TABLET ORAL DAILY
Qty: 15 TABLET | Refills: 1 | Status: SHIPPED | OUTPATIENT
Start: 2020-04-03 | End: 2020-04-17

## 2020-04-03 RX ORDER — FLUCONAZOLE 100 MG/1
100 TABLET ORAL DAILY
Qty: 30 TABLET | Refills: 0 | Status: SHIPPED | OUTPATIENT
Start: 2020-04-03 | End: 2020-05-08

## 2020-04-03 RX ORDER — ACYCLOVIR 800 MG/1
800 TABLET ORAL 2 TIMES DAILY
Qty: 60 TABLET | Refills: 1 | Status: SHIPPED | OUTPATIENT
Start: 2020-04-03 | End: 2020-05-08

## 2020-04-03 RX ADMIN — ROMIPLOSTIM 400 MCG: 500 INJECTION, POWDER, LYOPHILIZED, FOR SOLUTION SUBCUTANEOUS at 08:25

## 2020-04-03 ASSESSMENT — PAIN SCALES - GENERAL: PAINLEVEL: NO PAIN (0)

## 2020-04-03 NOTE — NURSING NOTE
"Oncology Rooming Note    April 3, 2020 7:38 AM   Angel Yanez is a 59 year old male who presents for:    Chief Complaint   Patient presents with     Blood Draw     Labs drawn via PIV placed by RN in lab. VS taken. Patient checked in for next appt.     RECHECK     pt here for recheck s/p bmt for MM     Initial Vitals: /81 (BP Location: Right arm, Patient Position: Sitting, Cuff Size: Adult Regular)   Pulse 82   Temp 98  F (36.7  C) (Oral)   Resp 16   Wt 78.9 kg (174 lb)   SpO2 98%   BMI 24.97 kg/m   Estimated body mass index is 24.97 kg/m  as calculated from the following:    Height as of 2/4/20: 1.778 m (5' 10\").    Weight as of this encounter: 78.9 kg (174 lb). Body surface area is 1.97 meters squared.  No Pain (0) Comment: Data Unavailable   No LMP for male patient.  Allergies reviewed: Yes  Medications reviewed: Yes    Medications: MEDICATION REFILLS NEEDED TODAY. Provider was notified.  Pharmacy name entered into EPIC:    [facility] - [place], [place] - [address]  [facility] - [place], [place] - [address] [facility]    Clinical concerns: needs refills of acyclovir, fluconazole, lisinopril  Karla HUI was notified.      ANKITA SILVERMAN RN              "

## 2020-04-03 NOTE — PROGRESS NOTES
Infusion Nursing Note:  Angel Yanez presents today for N-plate subcutaneous injection.    Patient seen by provider today: Yes: Karla HUI   present during visit today: Not Applicable.    Note: Patient here for n-plate injection and possible blood products. He did not need transfusions today. Patient received n-plate in subcutaneous tissue or right arm and tolerated injection well without any issues or side affects.    Intravenous Access:  N/A.    Treatment Conditions:  Lab Results   Component Value Date    HGB 9.8 04/03/2020     Lab Results   Component Value Date    WBC 2.6 04/03/2020      Lab Results   Component Value Date    ANEU 1.4 04/03/2020     Lab Results   Component Value Date    PLT 17 04/03/2020      Results reviewed, labs MET treatment parameters, ok to proceed with treatment.      Post Infusion Assessment:  Patient tolerated injection without incident.       Discharge Plan:   Patient discharged in stable condition accompanied by: self.  Departure Mode: Ambulatory.    ANKITA SILVERMAN RN

## 2020-04-03 NOTE — NURSING NOTE
Chief Complaint   Patient presents with     Blood Draw     Labs drawn via PIV placed by RN in lab. VS taken. Patient checked in for next appt.     Labs drawn from PIV placed by RN. Line flushed with saline. Vitals taken. Pt checked in for appointment.    Ling Rios RN

## 2020-04-10 ENCOUNTER — INFUSION THERAPY VISIT (OUTPATIENT)
Dept: TRANSPLANT | Facility: CLINIC | Age: 60
End: 2020-04-10
Attending: INTERNAL MEDICINE
Payer: COMMERCIAL

## 2020-04-10 ENCOUNTER — ONCOLOGY VISIT (OUTPATIENT)
Dept: TRANSPLANT | Facility: CLINIC | Age: 60
End: 2020-04-10
Attending: PHYSICIAN ASSISTANT
Payer: COMMERCIAL

## 2020-04-10 VITALS
HEART RATE: 65 BPM | TEMPERATURE: 98.3 F | DIASTOLIC BLOOD PRESSURE: 91 MMHG | OXYGEN SATURATION: 99 % | RESPIRATION RATE: 16 BRPM | SYSTOLIC BLOOD PRESSURE: 146 MMHG

## 2020-04-10 VITALS
TEMPERATURE: 97.9 F | HEART RATE: 71 BPM | OXYGEN SATURATION: 99 % | SYSTOLIC BLOOD PRESSURE: 145 MMHG | BODY MASS INDEX: 25.21 KG/M2 | WEIGHT: 175.7 LBS | DIASTOLIC BLOOD PRESSURE: 81 MMHG | RESPIRATION RATE: 16 BRPM

## 2020-04-10 DIAGNOSIS — L40.50 PSORIASIS WITH ARTHROPATHY (H): Primary | ICD-10-CM

## 2020-04-10 DIAGNOSIS — C90.00 MULTIPLE MYELOMA NOT HAVING ACHIEVED REMISSION (H): ICD-10-CM

## 2020-04-10 LAB
ALBUMIN SERPL-MCNC: 3.9 G/DL (ref 3.4–5)
ALP SERPL-CCNC: 70 U/L (ref 40–150)
ALT SERPL W P-5'-P-CCNC: 24 U/L (ref 0–70)
ANION GAP SERPL CALCULATED.3IONS-SCNC: 6 MMOL/L (ref 3–14)
AST SERPL W P-5'-P-CCNC: 18 U/L (ref 0–45)
BASOPHILS # BLD AUTO: 0 10E9/L (ref 0–0.2)
BASOPHILS NFR BLD AUTO: 0.4 %
BILIRUB SERPL-MCNC: 0.4 MG/DL (ref 0.2–1.3)
BLD PROD TYP BPU: NORMAL
BLD UNIT ID BPU: 0
BLOOD PRODUCT CODE: NORMAL
BPU ID: NORMAL
BUN SERPL-MCNC: 20 MG/DL (ref 7–30)
CALCIUM SERPL-MCNC: 9.1 MG/DL (ref 8.5–10.1)
CHLORIDE SERPL-SCNC: 110 MMOL/L (ref 94–109)
CO2 SERPL-SCNC: 28 MMOL/L (ref 20–32)
CREAT SERPL-MCNC: 1.01 MG/DL (ref 0.66–1.25)
DIFFERENTIAL METHOD BLD: ABNORMAL
EOSINOPHIL # BLD AUTO: 0 10E9/L (ref 0–0.7)
EOSINOPHIL NFR BLD AUTO: 1.3 %
ERYTHROCYTE [DISTWIDTH] IN BLOOD BY AUTOMATED COUNT: 18 % (ref 10–15)
GFR SERPL CREATININE-BSD FRML MDRD: 79 ML/MIN/{1.73_M2}
GLUCOSE SERPL-MCNC: 79 MG/DL (ref 70–99)
HCT VFR BLD AUTO: 27.6 % (ref 40–53)
HGB BLD-MCNC: 9.3 G/DL (ref 13.3–17.7)
IMM GRANULOCYTES # BLD: 0 10E9/L (ref 0–0.4)
IMM GRANULOCYTES NFR BLD: 0 %
LYMPHOCYTES # BLD AUTO: 0.6 10E9/L (ref 0.8–5.3)
LYMPHOCYTES NFR BLD AUTO: 28.3 %
MAGNESIUM SERPL-MCNC: 1.9 MG/DL (ref 1.6–2.3)
MCH RBC QN AUTO: 39.2 PG (ref 26.5–33)
MCHC RBC AUTO-ENTMCNC: 33.7 G/DL (ref 31.5–36.5)
MCV RBC AUTO: 117 FL (ref 78–100)
MONOCYTES # BLD AUTO: 0.4 10E9/L (ref 0–1.3)
MONOCYTES NFR BLD AUTO: 17.9 %
NEUTROPHILS # BLD AUTO: 1.2 10E9/L (ref 1.6–8.3)
NEUTROPHILS NFR BLD AUTO: 52.1 %
NRBC # BLD AUTO: 0 10*3/UL
NRBC BLD AUTO-RTO: 0 /100
PLATELET # BLD AUTO: 4 10E9/L (ref 150–450)
POTASSIUM SERPL-SCNC: 4.2 MMOL/L (ref 3.4–5.3)
PROT SERPL-MCNC: 6.9 G/DL (ref 6.8–8.8)
RBC # BLD AUTO: 2.37 10E12/L (ref 4.4–5.9)
SODIUM SERPL-SCNC: 143 MMOL/L (ref 133–144)
TRANSFUSION STATUS PATIENT QL: NORMAL
TRANSFUSION STATUS PATIENT QL: NORMAL
WBC # BLD AUTO: 2.2 10E9/L (ref 4–11)

## 2020-04-10 PROCEDURE — 40000268 ZZH STATISTIC NO CHARGES: Mod: ZF

## 2020-04-10 PROCEDURE — 96372 THER/PROPH/DIAG INJ SC/IM: CPT

## 2020-04-10 PROCEDURE — G0463 HOSPITAL OUTPT CLINIC VISIT: HCPCS | Mod: 25

## 2020-04-10 PROCEDURE — 36430 TRANSFUSION BLD/BLD COMPNT: CPT

## 2020-04-10 PROCEDURE — P9037 PLATE PHERES LEUKOREDU IRRAD: HCPCS | Performed by: PHYSICIAN ASSISTANT

## 2020-04-10 PROCEDURE — 85025 COMPLETE CBC W/AUTO DIFF WBC: CPT | Performed by: NURSE PRACTITIONER

## 2020-04-10 PROCEDURE — 25000128 H RX IP 250 OP 636: Mod: ZF | Performed by: STUDENT IN AN ORGANIZED HEALTH CARE EDUCATION/TRAINING PROGRAM

## 2020-04-10 PROCEDURE — 83735 ASSAY OF MAGNESIUM: CPT | Performed by: NURSE PRACTITIONER

## 2020-04-10 PROCEDURE — 80053 COMPREHEN METABOLIC PANEL: CPT | Performed by: NURSE PRACTITIONER

## 2020-04-10 RX ORDER — PENTAMIDINE ISETHIONATE 300 MG/300MG
300 INHALANT RESPIRATORY (INHALATION)
Status: CANCELLED
Start: 2020-04-17

## 2020-04-10 RX ORDER — HEPARIN SODIUM,PORCINE 10 UNIT/ML
5 VIAL (ML) INTRAVENOUS
Status: CANCELLED | OUTPATIENT
Start: 2020-04-17

## 2020-04-10 RX ORDER — ALBUTEROL SULFATE 5 MG/ML
2.5 SOLUTION RESPIRATORY (INHALATION)
Status: CANCELLED
Start: 2020-04-17

## 2020-04-10 RX ADMIN — ROMIPLOSTIM 560 MCG: 125 INJECTION, POWDER, LYOPHILIZED, FOR SOLUTION SUBCUTANEOUS at 08:50

## 2020-04-10 ASSESSMENT — PAIN SCALES - GENERAL: PAINLEVEL: NO PAIN (0)

## 2020-04-10 NOTE — PROGRESS NOTES
Infusion Nursing Note:  Angel Yanez presents today for platelet transfusion.    Patient seen by provider today: Yes: Mony Bridgeton [employer]   present during visit today: Not Applicable.    Note: pt was given 1 unit platelets for platelet count of 6 and N-plate subcutaneous.  Heme labs monitored and no further replacement needs today    Intravenous Access:  Peripheral IV placed.    Treatment Conditions:  Lab Results   Component Value Date    HGB 9.3 04/10/2020     Lab Results   Component Value Date    WBC 2.2 04/10/2020      Lab Results   Component Value Date    ANEU 1.2 04/10/2020     Lab Results   Component Value Date    PLT 4 04/10/2020      Lab Results   Component Value Date     04/10/2020                   Lab Results   Component Value Date    POTASSIUM 4.2 04/10/2020           Lab Results   Component Value Date    MAG 1.9 04/10/2020            Lab Results   Component Value Date    CR 1.01 04/10/2020                   Lab Results   Component Value Date    ZHEN 9.1 04/10/2020                Lab Results   Component Value Date    BILITOTAL 0.4 04/10/2020           Lab Results   Component Value Date    ALBUMIN 3.9 04/10/2020                    Lab Results   Component Value Date    ALT 24 04/10/2020           Lab Results   Component Value Date    AST 18 04/10/2020       Results reviewed, labs MET treatment parameters, ok to proceed with treatment.      Post Infusion Assessment:  Patient tolerated infusion without incident.  Patient tolerated injection without incident.  Blood return noted pre and post infusion.  No evidence of extravasations.  Access discontinued per protocol.       Discharge Plan:   Patient discharged in stable condition accompanied by: self.  Departure Mode: Ambulatory.    Ninfa Webster RN

## 2020-04-10 NOTE — PROGRESS NOTES
"BMT Clinic Progress Note    Patient ID: Angel Yanez is a 60 yo man D+3229s/p 2nd autologous transplant for MM.     Interval history:     Guille returns for nplate and follow up. Has been doing remodeling in a home bathroom and working on a . Noted a larger right arm bruise he blames on moving the . No edith bleeding. No fevers, cough or cold symptoms. States that if he didn't know his platelets were low he feels \"totally normal\". Bit the inside of his mouth this week, has sore there.    ROS: 8 point ROS neg unless specified above.       Physical Exam:   VSS  General: NAD, engaged and talkative.   ENT: oral mucosa moist without ulceration, No OP erythema or petechiae. Flesh colored ~2mm swollen site at right upper gumline (where pt bit his cheek).  Lungs: CTAB, no w/r/r, nonlabored breathing  Cardiovascular: RRR, S1, S2, no m/r/g  GI/Abdomen: +BS, soft, nontender, non-distended  Skin: baseball sized ecchymosis dark purple on right forearm. No LE petechiae or other rash noted.   MSK/Extremities: Warm, well perfused.  Neurologic: alert, and conversant    Data:     Lab Results   Component Value Date    WBC 2.2 (L) 04/10/2020    ANEU 1.2 (L) 04/10/2020    HGB 9.3 (L) 04/10/2020    HCT 27.6 (L) 04/10/2020    PLT 4 (LL) 04/10/2020     04/10/2020    POTASSIUM 4.2 04/10/2020    CHLORIDE 110 (H) 04/10/2020    CO2 28 04/10/2020    GLC 79 04/10/2020    BUN 20 04/10/2020    CR 1.01 04/10/2020    MAG 1.9 04/10/2020    INR 1.01 03/12/2020    BILITOTAL 0.4 04/10/2020    AST 18 04/10/2020    ALT 24 04/10/2020    ALKPHOS 70 04/10/2020    PROTTOTAL 6.9 04/10/2020    ALBUMIN 3.9 04/10/2020       Assessment and Plan:     Angel Yanez is a 60 yo man D+329 s/p 2nd autologous transplant for MM with ongoing cytopenias.     1.  BMT/MM:   Slow engraftment; cell dose was only 0.639x10^6. (Marrow [place] - known poor cell dose as failed chemo-mobilization 1/2019.)   - day +180 bone marrow biopsy 11/6/19 which " showed no morphologic or immunophenotypic evidence of plasma cell neoplasm. His light chains were not elevated and he had no monoclonal protein in the serum.  He is in clinical remission.  - PET in 8/2019 clear.   - PB FLOW 11/23: No overt aberrant immunophenotype on T cells.  Rare to absent mature B cells and plasma cells.    - BMBX 3/6:  No morphologic or immunophenotypic evidence of recurrent/persistent plasma cell neoplasm. Cellular marrow with trilineage hematopoiesis, dyserythropoiesis, and no increase in blasts.  No increase in ring sideroblasts.  Less than 5% polytypic plasma cells .  Peripheral blood showing marked macrocytic anemia, lymphocytopenia, and marked thrombocytopenia     2.  HEME: Keep Hgb>8g/dL, plt >10k. Given plt for 4k today 4/10  - WBC slightly down today but ANC okay at 1.2. Last GCSF 3/3/2020.   - Started Nplate 3/19. Increase to 7mcg/kg today 4/10 (max dose 10mcg/kg).  - Persistent thrombocytopenia: previous trial of promacta not helpful. Stopped 12/6/19. Trial of prednisone not helpful, currently on a Pred taper down to 10mg every day & complete 4/14     3.  ID: Afebrile but has largely been on steroids  - FUOs: Extensive ID work up- no etiology identified. Now afebrile since treated with Pred     Prophy: ACV, Fluconazole & pentamidine (3/26). Not currently on Levaquin  - t cell subset, Absolute CG2=211 assessed 12/10/19  - influenza vaccination given 11/26/19     4. GI:  no complaints.      5. CV: Hx of steroid induced HTN. With steroid taper will dose reduce Lisinopril to 5mg dialy     6.  FEN/Renal: Cr and lytes WNL     7.  Mood: Continue Paxil.     8. FUOs- Some kind of auto immune process DDX:  Sarcoidosis? Underwent EBUS FNA which was negative for granulomas however unlikely you could see this on just fluid aspirate so would not consider   - completed pred taper 2/20/2020 as above without recurrence of fevers. Now on steroids for pancytopenia.        Plan: recommended no further  remodeling or garage work, patient verbalizes understanding of risk   Will need dapsone/atovoquone script for 4/22 (messaged Naveed in case he could stop PCP slightly early to the 1 year nikolai)     RTC weekly for lab & Nplate    Mony Pitts PAC  [phone]    Addendum: no PCP extension required per follow up with Naveed

## 2020-04-10 NOTE — NURSING NOTE
"Oncology Rooming Note    April 10, 2020 8:06 AM   Angel Yanez is a 59 year old male who presents for:    Chief Complaint   Patient presents with     Blood Draw     Labs drawn via vpt by RN in lab. Vs taken in lab encounter. Pt checked in for appts.     Initial Vitals: There were no vitals taken for this visit. Estimated body mass index is 25.21 kg/m  as calculated from the following:    Height as of 2/4/20: 1.778 m (5' 10\").    Weight as of an earlier encounter on 4/10/20: 79.7 kg (175 lb 11.2 oz). There is no height or weight on file to calculate BSA.  Data Unavailable Comment: Data Unavailable   No LMP for male patient.  Allergies reviewed: Yes  Medications reviewed: Yes    Medications: Medication refills not needed today.  Pharmacy name entered into EPIC:    [facility] [place] [facility] [address] [facility] [place] - [place], [place] - [address]    Clinical concerns: n/a      Ninfa Webster RN              "

## 2020-04-16 NOTE — PROGRESS NOTES
BMT Clinic Progress Note    Patient ID: Angel Yanez is a 60 yo man D+336 s/p 2nd autologous transplant for MM.     Interval history:     Guille returns for nplate and follow up. He is feeling good.  No bleeding or bruising.  Afebrile with no cough or congestion.  Eating with no N/V/D.  No pain.      ROS: 8 point ROS neg unless specified above.       Physical Exam:   Blood pressure (!) 149/82, pulse 70, temperature 97  F (36.1  C), temperature source Oral, resp. rate 16, weight 79.5 kg (175 lb 4.8 oz), SpO2 99 %.    General: NAD, engaged and talkative.   ENT: oral mucosa moist without ulceration, No OP erythema or petechiae.  Lungs: CTAB  Cardiovascular: RRR, S1, S2, no m/r/g  GI/Abdomen: +BS, soft, nontender, non-distended  Skin: No rashes or petechaie  MSK/Extremities: Warm, well perfused.  Neurologic: alert, and conversant    Data:     Lab Results   Component Value Date    WBC 2.3 (L) 04/17/2020    ANEU 1.1 (L) 04/17/2020    HGB 9.8 (L) 04/17/2020    HCT 28.5 (L) 04/17/2020    PLT 5 (LL) 04/17/2020     04/17/2020    POTASSIUM 4.0 04/17/2020    CHLORIDE 110 (H) 04/17/2020    CO2 24 04/17/2020    GLC 74 04/17/2020    BUN 20 04/17/2020    CR 0.87 04/17/2020    MAG 1.9 04/10/2020    INR 1.01 03/12/2020    BILITOTAL 0.4 04/17/2020    AST 26 04/17/2020    ALT 27 04/17/2020    ALKPHOS 73 04/17/2020    PROTTOTAL 7.2 04/17/2020    ALBUMIN 4.0 04/17/2020       Assessment and Plan:     Angel Yanez is a 60 yo man D+336 s/p 2nd autologous transplant for MM with ongoing cytopenias.     1.  BMT/MM:   Slow engraftment; cell dose was only 0.639x10^6. (Marrow [place] - known poor cell dose as failed chemo-mobilization 1/2019.)   - day +180 bone marrow biopsy 11/6/19 which showed no morphologic or immunophenotypic evidence of plasma cell neoplasm. His light chains were not elevated and he had no monoclonal protein in the serum.  He is in clinical remission.  - PET in 8/2019 clear.   - PB FLOW 11/23: No overt aberrant  immunophenotype on T cells.  Rare to absent mature B cells and plasma cells.    - BMBX 3/6:  No morphologic or immunophenotypic evidence of recurrent/persistent plasma cell neoplasm. Cellular marrow with trilineage hematopoiesis, dyserythropoiesis, and no increase in blasts.  No increase in ring sideroblasts.  Less than 5% polytypic plasma cells .  Peripheral blood showing marked macrocytic anemia, lymphocytopenia, and marked thrombocytopenia     2.  HEME: Keep Hgb>8g/dL, plt >10k.   - WBC stable, ANC okay at 1.1. Last GCSF 3/3/2020.   - Persistant profound thrombocytopenia.  Started Nplate 3/19. Increase to max dose 10mcg/kg today    -  previous trial of promacta not helpful. Stopped 12/6/19. Trial of prednisone not helpful, Pred taper compelte on 4/16.      3.  ID:   - FUOs: Extensive ID work up- no etiology identified. Now afebrile since treated with Pred     Prophy: ACV, Fluconazole & pentamidine (3/26). Per Dr. Lucio OK to discontinue PCP proph  - t cell subset, Absolute HU5=098 assessed 12/10/19  - influenza vaccination given 11/26/19     4. GI:  no complaints.      5. CV: Hx of steroid induced HTN, remains hypertensive now.  Cont Lisinopril 5mg daily     6.  FEN/Renal: Cr and lytes WNL     7.  Mood: Continue Paxil.     8. FUOs- Some kind of auto immune process DDX:  Sarcoidosis? Underwent EBUS FNA which was negative for granulomas however unlikely you could see this on just fluid aspirate so would not consider          RTC weekly for lab, plt & Nplate    Karla Manzo

## 2020-04-17 ENCOUNTER — INFUSION THERAPY VISIT (OUTPATIENT)
Dept: TRANSPLANT | Facility: CLINIC | Age: 60
End: 2020-04-17
Attending: NURSE PRACTITIONER
Payer: COMMERCIAL

## 2020-04-17 ENCOUNTER — APPOINTMENT (OUTPATIENT)
Dept: LAB | Facility: CLINIC | Age: 60
End: 2020-04-17
Attending: NURSE PRACTITIONER
Payer: COMMERCIAL

## 2020-04-17 VITALS
WEIGHT: 175.3 LBS | BODY MASS INDEX: 25.15 KG/M2 | RESPIRATION RATE: 16 BRPM | OXYGEN SATURATION: 99 % | TEMPERATURE: 97 F | HEART RATE: 70 BPM | SYSTOLIC BLOOD PRESSURE: 149 MMHG | DIASTOLIC BLOOD PRESSURE: 82 MMHG

## 2020-04-17 VITALS
DIASTOLIC BLOOD PRESSURE: 86 MMHG | SYSTOLIC BLOOD PRESSURE: 143 MMHG | TEMPERATURE: 98.4 F | HEART RATE: 65 BPM | WEIGHT: 175.3 LBS | RESPIRATION RATE: 16 BRPM | OXYGEN SATURATION: 99 % | BODY MASS INDEX: 25.15 KG/M2

## 2020-04-17 DIAGNOSIS — D69.6 THROMBOCYTOPENIA (H): Primary | ICD-10-CM

## 2020-04-17 DIAGNOSIS — I15.9 SECONDARY HYPERTENSION: ICD-10-CM

## 2020-04-17 DIAGNOSIS — C90.00 MULTIPLE MYELOMA NOT HAVING ACHIEVED REMISSION (H): Primary | ICD-10-CM

## 2020-04-17 DIAGNOSIS — L40.50 PSORIASIS WITH ARTHROPATHY (H): ICD-10-CM

## 2020-04-17 DIAGNOSIS — Z94.81 STATUS POST BONE MARROW TRANSPLANT (H): ICD-10-CM

## 2020-04-17 LAB
ALBUMIN SERPL-MCNC: 4 G/DL (ref 3.4–5)
ALP SERPL-CCNC: 73 U/L (ref 40–150)
ALT SERPL W P-5'-P-CCNC: 27 U/L (ref 0–70)
ANION GAP SERPL CALCULATED.3IONS-SCNC: 9 MMOL/L (ref 3–14)
AST SERPL W P-5'-P-CCNC: 26 U/L (ref 0–45)
BASOPHILS # BLD AUTO: 0 10E9/L (ref 0–0.2)
BASOPHILS NFR BLD AUTO: 0.4 %
BILIRUB SERPL-MCNC: 0.4 MG/DL (ref 0.2–1.3)
BLD PROD TYP BPU: NORMAL
BLD UNIT ID BPU: 0
BLOOD PRODUCT CODE: NORMAL
BPU ID: NORMAL
BUN SERPL-MCNC: 20 MG/DL (ref 7–30)
CALCIUM SERPL-MCNC: 9 MG/DL (ref 8.5–10.1)
CHLORIDE SERPL-SCNC: 110 MMOL/L (ref 94–109)
CO2 SERPL-SCNC: 24 MMOL/L (ref 20–32)
CREAT SERPL-MCNC: 0.87 MG/DL (ref 0.66–1.25)
DIFFERENTIAL METHOD BLD: ABNORMAL
EOSINOPHIL # BLD AUTO: 0 10E9/L (ref 0–0.7)
EOSINOPHIL NFR BLD AUTO: 1.3 %
ERYTHROCYTE [DISTWIDTH] IN BLOOD BY AUTOMATED COUNT: 16.9 % (ref 10–15)
GFR SERPL CREATININE-BSD FRML MDRD: >90 ML/MIN/{1.73_M2}
GLUCOSE SERPL-MCNC: 74 MG/DL (ref 70–99)
HCT VFR BLD AUTO: 28.5 % (ref 40–53)
HGB BLD-MCNC: 9.8 G/DL (ref 13.3–17.7)
IMM GRANULOCYTES # BLD: 0 10E9/L (ref 0–0.4)
IMM GRANULOCYTES NFR BLD: 0.4 %
LYMPHOCYTES # BLD AUTO: 0.7 10E9/L (ref 0.8–5.3)
LYMPHOCYTES NFR BLD AUTO: 30.3 %
MCH RBC QN AUTO: 39.2 PG (ref 26.5–33)
MCHC RBC AUTO-ENTMCNC: 34.4 G/DL (ref 31.5–36.5)
MCV RBC AUTO: 114 FL (ref 78–100)
MONOCYTES # BLD AUTO: 0.4 10E9/L (ref 0–1.3)
MONOCYTES NFR BLD AUTO: 19.3 %
NEUTROPHILS # BLD AUTO: 1.1 10E9/L (ref 1.6–8.3)
NEUTROPHILS NFR BLD AUTO: 48.3 %
NRBC # BLD AUTO: 0 10*3/UL
NRBC BLD AUTO-RTO: 0 /100
PLATELET # BLD AUTO: 5 10E9/L (ref 150–450)
POTASSIUM SERPL-SCNC: 4 MMOL/L (ref 3.4–5.3)
PROT SERPL-MCNC: 7.2 G/DL (ref 6.8–8.8)
RBC # BLD AUTO: 2.5 10E12/L (ref 4.4–5.9)
SODIUM SERPL-SCNC: 142 MMOL/L (ref 133–144)
TRANSFUSION STATUS PATIENT QL: NORMAL
TRANSFUSION STATUS PATIENT QL: NORMAL
WBC # BLD AUTO: 2.3 10E9/L (ref 4–11)

## 2020-04-17 PROCEDURE — P9037 PLATE PHERES LEUKOREDU IRRAD: HCPCS | Performed by: PHYSICIAN ASSISTANT

## 2020-04-17 PROCEDURE — 85025 COMPLETE CBC W/AUTO DIFF WBC: CPT | Performed by: STUDENT IN AN ORGANIZED HEALTH CARE EDUCATION/TRAINING PROGRAM

## 2020-04-17 PROCEDURE — 36430 TRANSFUSION BLD/BLD COMPNT: CPT

## 2020-04-17 PROCEDURE — 25000128 H RX IP 250 OP 636: Mod: ZF | Performed by: NURSE PRACTITIONER

## 2020-04-17 PROCEDURE — 96372 THER/PROPH/DIAG INJ SC/IM: CPT

## 2020-04-17 PROCEDURE — 80053 COMPREHEN METABOLIC PANEL: CPT | Performed by: STUDENT IN AN ORGANIZED HEALTH CARE EDUCATION/TRAINING PROGRAM

## 2020-04-17 RX ORDER — LISINOPRIL 10 MG/1
10 TABLET ORAL DAILY
Qty: 30 TABLET | Refills: 3 | Status: SHIPPED | OUTPATIENT
Start: 2020-04-17 | End: 2020-05-08

## 2020-04-17 RX ORDER — LISINOPRIL 10 MG/1
5 TABLET ORAL DAILY
Qty: 90 TABLET | Refills: 4 | COMMUNITY
Start: 2020-04-17 | End: 2020-04-30

## 2020-04-17 RX ADMIN — ROMIPLOSTIM 795 MCG: 125 INJECTION, POWDER, LYOPHILIZED, FOR SOLUTION SUBCUTANEOUS at 09:04

## 2020-04-17 ASSESSMENT — PAIN SCALES - GENERAL
PAINLEVEL: NO PAIN (0)
PAINLEVEL: NO PAIN (0)

## 2020-04-17 NOTE — NURSING NOTE
"Oncology Rooming Note    April 17, 2020 7:52 AM   Angel Yanez is a 59 year old male who presents for:    Chief Complaint   Patient presents with     Blood Draw     Vitals, blood drawn and PIV was placed by LPN. Pt checked into appt.      RECHECK     Multiple myeloma not having achieved remission (H)     Initial Vitals: BP (!) 149/82   Pulse 70   Temp 97  F (36.1  C) (Oral)   Resp 16   Wt 79.5 kg (175 lb 4.8 oz)   SpO2 99%   BMI 25.15 kg/m   Estimated body mass index is 25.15 kg/m  as calculated from the following:    Height as of 2/4/20: 1.778 m (5' 10\").    Weight as of this encounter: 79.5 kg (175 lb 4.8 oz). Body surface area is 1.98 meters squared.  No Pain (0) Comment: Data Unavailable   No LMP for male patient.  Allergies reviewed: Yes  Medications reviewed: Yes    Medications: Medication refills not needed today.  Pharmacy name entered into EPIC:    [facility] [facility] [address]  [facility] - [place], [place] - [address]    Clinical concerns: JUAN MANUEL Guthrie CMA              "

## 2020-04-17 NOTE — PROGRESS NOTES
Infusion Nursing Note:  Angel SUMMER Renata presents today for platelets.    Patient seen by provider today: Yes: Karla Manzo   present during visit today: Not Applicable.    Note: Patient arrives for platelet transfusion to keep > 10.  No premedications needed and patient tolerated transfusion without incident.  NPlate administered subcutaneous left arm.  No further replacement needs today.    Intravenous Access:  Peripheral IV placed.    Treatment Conditions:  Lab Results   Component Value Date    HGB 9.8 04/17/2020     Lab Results   Component Value Date    WBC 2.3 04/17/2020      Lab Results   Component Value Date    ANEU 1.1 04/17/2020     Lab Results   Component Value Date    PLT 5 04/17/2020      Results reviewed, labs MET treatment parameters, ok to proceed with treatment.      Post Infusion Assessment:  Patient tolerated infusion without incident.  Patient tolerated injection without incident.  No evidence of extravasations.  Access discontinued per protocol.       Discharge Plan:   Copy of AVS reviewed with patient and/or family.  Patient will return 4/24 for next appointment.  Patient discharged in stable condition accompanied by: self.  Departure Mode: Ambulatory.    Isamar Tam RN, [facility]

## 2020-04-17 NOTE — NURSING NOTE
Chief Complaint   Patient presents with     Infusion     PLT transfusion, hx MM s/p transplant.

## 2020-04-17 NOTE — PROGRESS NOTES
Chief Complaint   Patient presents with     Blood Draw     Vitals, blood drawn and PIV was placed by LPN. Pt checked into appt.      DEBBIE Hernandez LPN

## 2020-04-20 NOTE — NURSING NOTE
Chief Complaint   Patient presents with     Blood Draw     labs drawn with IV start by rn.  vital signs taken.     Labs drawn with IV start by RN in lab.  Vital signs taken.  Cassie Louis МАРИНА notified of pulse and temp.  Blood cultures were drawn.  Pt checked in to next appointment and brought over to infusion.  Caitlin Webster RN     Message left for patient's mother to return call. Patient needs to reschedule 4/29/20 appointment to Friday 5/1/20 in AM.

## 2020-04-23 NOTE — PROGRESS NOTES
BMT Clinic Progress Note    Patient ID: Angel Yanez is a 58 yo man D+343 s/p 2nd autologous transplant for MM.     Interval history:     Guille returns for nplate and follow up. He is feeling good.  Had a scant nose bleed earlier this week that lasted for about an hour & stopped on its own.  No other bleeding.   Afebrile with no cough or congestion.  Eating with no N/V/D. C/O some jaw pain, maybe from grinding his teeth.      ROS: 8 point ROS neg unless specified above.       Physical Exam:   Blood pressure (!) 142/89, pulse 73, temperature 97.9  F (36.6  C), temperature source Oral, resp. rate 20, weight 80.3 kg (177 lb 1.6 oz), SpO2 100 %.    General: NAD, engaged and talkative.   ENT: oral mucosa moist without ulceration, No OP erythema or petechiae.  Lungs: CTAB  Cardiovascular: RRR, S1, S2, no m/r/g  GI/Abdomen: +BS, soft, nontender, non-distended  Skin: No rashes or petechaie.  Scattered ecchymosis  MSK/Extremities: Warm, well perfused.  Neurologic: alert, and conversant    Data:     Lab Results   Component Value Date    WBC 3.2 (L) 04/24/2020    ANEU 1.7 04/24/2020    HGB 9.8 (L) 04/24/2020    HCT 30.0 (L) 04/24/2020    PLT 8 (LL) 04/24/2020     04/24/2020    POTASSIUM 4.2 04/24/2020    CHLORIDE 107 04/24/2020    CO2 28 04/24/2020    GLC 97 04/24/2020    BUN 19 04/24/2020    CR 1.01 04/24/2020    MAG 2.2 04/24/2020    INR 1.01 03/12/2020    BILITOTAL 0.4 04/24/2020    AST 18 04/24/2020    ALT 25 04/24/2020    ALKPHOS 75 04/24/2020    PROTTOTAL 7.6 04/24/2020    ALBUMIN 4.1 04/24/2020       Assessment and Plan:     Angel Yanez is a 58 yo man D+343 s/p 2nd autologous transplant for MM with ongoing cytopenias.     1.  BMT/MM:   Slow engraftment; cell dose was only 0.639x10^6. (Marrow [place] - known poor cell dose as failed chemo-mobilization 1/2019.)   - day +180 bone marrow biopsy 11/6/19 which showed no morphologic or immunophenotypic evidence of plasma cell neoplasm. His light chains were not  elevated and he had no monoclonal protein in the serum.  He is in clinical remission.  - PET in 8/2019 clear.   - PB FLOW 11/23: No overt aberrant immunophenotype on T cells.  Rare to absent mature B cells and plasma cells.    - BMBX 3/6:  No morphologic or immunophenotypic evidence of recurrent/persistent plasma cell neoplasm. Cellular marrow with trilineage hematopoiesis, dyserythropoiesis, and no increase in blasts.  No increase in ring sideroblasts.  Less than 5% polytypic plasma cells .  Peripheral blood showing marked macrocytic anemia, lymphocytopenia, and marked thrombocytopenia     2.  HEME: Keep Hgb>8g/dL, plt >10k.   - WBC stable, ANC okay at 1.7. Last GCSF 3/3/2020.   - Persistant profound thrombocytopenia.  Started Nplate 3/19. Now on max dose 10mcg/kg   -  previous trial of promacta not helpful. Stopped 12/6/19. Trial of prednisone not helpful, Pred taper compelte on 4/16.      3.  ID:   - FUOs: Extensive ID work up- no etiology identified. Now afebrile     Prophy: ACV, Fluconazole & pentamidine (3/26). Per Dr. Lucio OK to discontinue PCP proph  - influenza vaccination given 11/26/19     4. GI:  no complaints.      5. CV: Hx of steroid induced HTN, remains hypertensive now.  Cont Lisinopril 10mg daily     6.  FEN/Renal: Cr and lytes WNL     7.  Mood: Continue Paxil.     8. FUOs- Some kind of auto immune process DDX:  Sarcoidosis? Underwent EBUS FNA which was negative for granulomas however unlikely you could see this on just fluid aspirate so would not consider          RTC 4/30 with Dr. Lucio,  lab, plt & Nplate    Karla Manzo

## 2020-04-24 ENCOUNTER — INFUSION THERAPY VISIT (OUTPATIENT)
Dept: TRANSPLANT | Facility: CLINIC | Age: 60
End: 2020-04-24
Attending: INTERNAL MEDICINE
Payer: COMMERCIAL

## 2020-04-24 ENCOUNTER — APPOINTMENT (OUTPATIENT)
Dept: LAB | Facility: CLINIC | Age: 60
End: 2020-04-24
Attending: NURSE PRACTITIONER
Payer: COMMERCIAL

## 2020-04-24 ENCOUNTER — ONCOLOGY VISIT (OUTPATIENT)
Dept: TRANSPLANT | Facility: CLINIC | Age: 60
End: 2020-04-24
Attending: PHYSICIAN ASSISTANT
Payer: COMMERCIAL

## 2020-04-24 VITALS
HEART RATE: 73 BPM | TEMPERATURE: 97.9 F | RESPIRATION RATE: 20 BRPM | DIASTOLIC BLOOD PRESSURE: 89 MMHG | SYSTOLIC BLOOD PRESSURE: 142 MMHG | WEIGHT: 177.1 LBS | OXYGEN SATURATION: 100 % | BODY MASS INDEX: 25.41 KG/M2

## 2020-04-24 VITALS
HEART RATE: 71 BPM | RESPIRATION RATE: 14 BRPM | SYSTOLIC BLOOD PRESSURE: 148 MMHG | TEMPERATURE: 98 F | DIASTOLIC BLOOD PRESSURE: 90 MMHG | OXYGEN SATURATION: 100 %

## 2020-04-24 DIAGNOSIS — Z94.81 STATUS POST BONE MARROW TRANSPLANT (H): ICD-10-CM

## 2020-04-24 DIAGNOSIS — D69.6 THROMBOCYTOPENIA (H): Primary | ICD-10-CM

## 2020-04-24 DIAGNOSIS — L40.50 PSORIASIS WITH ARTHROPATHY (H): ICD-10-CM

## 2020-04-24 LAB
ALBUMIN SERPL-MCNC: 4.1 G/DL (ref 3.4–5)
ALP SERPL-CCNC: 75 U/L (ref 40–150)
ALT SERPL W P-5'-P-CCNC: 25 U/L (ref 0–70)
ANION GAP SERPL CALCULATED.3IONS-SCNC: 5 MMOL/L (ref 3–14)
AST SERPL W P-5'-P-CCNC: 18 U/L (ref 0–45)
BASOPHILS # BLD AUTO: 0 10E9/L (ref 0–0.2)
BASOPHILS NFR BLD AUTO: 0.3 %
BILIRUB SERPL-MCNC: 0.4 MG/DL (ref 0.2–1.3)
BLD PROD TYP BPU: NORMAL
BLD UNIT ID BPU: 0
BLOOD PRODUCT CODE: NORMAL
BPU ID: NORMAL
BUN SERPL-MCNC: 19 MG/DL (ref 7–30)
CALCIUM SERPL-MCNC: 9.4 MG/DL (ref 8.5–10.1)
CHLORIDE SERPL-SCNC: 107 MMOL/L (ref 94–109)
CO2 SERPL-SCNC: 28 MMOL/L (ref 20–32)
CREAT SERPL-MCNC: 1.01 MG/DL (ref 0.66–1.25)
DIFFERENTIAL METHOD BLD: ABNORMAL
EOSINOPHIL # BLD AUTO: 0.1 10E9/L (ref 0–0.7)
EOSINOPHIL NFR BLD AUTO: 1.6 %
ERYTHROCYTE [DISTWIDTH] IN BLOOD BY AUTOMATED COUNT: 16.2 % (ref 10–15)
GFR SERPL CREATININE-BSD FRML MDRD: 79 ML/MIN/{1.73_M2}
GLUCOSE SERPL-MCNC: 97 MG/DL (ref 70–99)
HCT VFR BLD AUTO: 30 % (ref 40–53)
HGB BLD-MCNC: 9.8 G/DL (ref 13.3–17.7)
IMM GRANULOCYTES # BLD: 0 10E9/L (ref 0–0.4)
IMM GRANULOCYTES NFR BLD: 0.3 %
LYMPHOCYTES # BLD AUTO: 0.8 10E9/L (ref 0.8–5.3)
LYMPHOCYTES NFR BLD AUTO: 25.9 %
MAGNESIUM SERPL-MCNC: 2.2 MG/DL (ref 1.6–2.3)
MCH RBC QN AUTO: 37.8 PG (ref 26.5–33)
MCHC RBC AUTO-ENTMCNC: 32.7 G/DL (ref 31.5–36.5)
MCV RBC AUTO: 116 FL (ref 78–100)
MONOCYTES # BLD AUTO: 0.6 10E9/L (ref 0–1.3)
MONOCYTES NFR BLD AUTO: 19.3 %
NEUTROPHILS # BLD AUTO: 1.7 10E9/L (ref 1.6–8.3)
NEUTROPHILS NFR BLD AUTO: 52.6 %
NRBC # BLD AUTO: 0 10*3/UL
NRBC BLD AUTO-RTO: 0 /100
PLATELET # BLD AUTO: 8 10E9/L (ref 150–450)
POTASSIUM SERPL-SCNC: 4.2 MMOL/L (ref 3.4–5.3)
PROT SERPL-MCNC: 7.6 G/DL (ref 6.8–8.8)
RBC # BLD AUTO: 2.59 10E12/L (ref 4.4–5.9)
SODIUM SERPL-SCNC: 140 MMOL/L (ref 133–144)
TRANSFUSION STATUS PATIENT QL: NORMAL
TRANSFUSION STATUS PATIENT QL: NORMAL
WBC # BLD AUTO: 3.2 10E9/L (ref 4–11)

## 2020-04-24 PROCEDURE — 96372 THER/PROPH/DIAG INJ SC/IM: CPT

## 2020-04-24 PROCEDURE — 25000128 H RX IP 250 OP 636: Mod: ZF | Performed by: NURSE PRACTITIONER

## 2020-04-24 PROCEDURE — 36430 TRANSFUSION BLD/BLD COMPNT: CPT

## 2020-04-24 PROCEDURE — 85025 COMPLETE CBC W/AUTO DIFF WBC: CPT | Performed by: NURSE PRACTITIONER

## 2020-04-24 PROCEDURE — 80053 COMPREHEN METABOLIC PANEL: CPT | Performed by: NURSE PRACTITIONER

## 2020-04-24 PROCEDURE — P9037 PLATE PHERES LEUKOREDU IRRAD: HCPCS | Performed by: PHYSICIAN ASSISTANT

## 2020-04-24 PROCEDURE — 83735 ASSAY OF MAGNESIUM: CPT | Performed by: NURSE PRACTITIONER

## 2020-04-24 RX ADMIN — ROMIPLOSTIM 750 MCG: 500 INJECTION, POWDER, LYOPHILIZED, FOR SOLUTION SUBCUTANEOUS at 10:41

## 2020-04-24 ASSESSMENT — PAIN SCALES - GENERAL: PAINLEVEL: NO PAIN (0)

## 2020-04-24 NOTE — NURSING NOTE
Chief Complaint   Patient presents with     Infusion     here for platelet infusion HX: MM with thrombocytopenia     Imm/Inj     here for NPLATE injection HX: MM with thrombocytopenia

## 2020-04-24 NOTE — NURSING NOTE
"Oncology Rooming Note    April 24, 2020 9:58 AM   Angel Yanez is a 59 year old male who presents for:    Chief Complaint   Patient presents with     Blood Draw     PIV placed, labs collected by RN.     RECHECK     here for provider visit and probable infusion visit HX: Thrombocytopenia     Initial Vitals: BP (!) 142/89 (BP Location: Right arm, Patient Position: Sitting)   Pulse 73   Temp 97.9  F (36.6  C) (Oral)   Resp 20   Wt 80.3 kg (177 lb 1.6 oz)   SpO2 100%   BMI 25.41 kg/m   Estimated body mass index is 25.41 kg/m  as calculated from the following:    Height as of 2/4/20: 1.778 m (5' 10\").    Weight as of this encounter: 80.3 kg (177 lb 1.6 oz). Body surface area is 1.99 meters squared.  No Pain (0) Comment: Data Unavailable   No LMP for male patient.  Allergies reviewed: Yes  Medications reviewed: Yes    Medications: Medication refills not needed today.  Pharmacy name entered into EPIC:    [facility] - [place], [place] - [address] [facility] - [place], [place] - [address] [facility]    Clinical concerns: PT reports an increase in \"arthritis\" pain since stopping Prednisone.  PT reports taking Tylenol prn.  Pt denies other complaints or recent fevers/cough/shortness of breath.  Probable infusion visit for platelets and NPLATE injection.      Emelina Bose RN              "

## 2020-04-24 NOTE — PROGRESS NOTES
Infusion Nursing Note:  Angel Yanez presents today for transfusion of platelets and NPLATE injection.    Patient seen by provider today: Yes: Karla HUI NP   present during visit today: Not Applicable.    Note: Pt s/p provider visit.  PT's labs reveal need for transfusion of platelets today and NPLATE injection (weekly).  Pt reports understanding of plan of care.  Will monitor tolerance of transfusion. Call light w/in reach.    Intravenous Access:  Peripheral IV placed by lab RN.  Removed upon completion of use.  Pressure wrap applied to left AC.    Treatment Conditions:  Lab Results   Component Value Date    HGB 9.8 04/24/2020     Lab Results   Component Value Date    WBC 3.2 04/24/2020      Lab Results   Component Value Date    ANEU 1.7 04/24/2020     Lab Results   Component Value Date    PLT 8 04/24/2020      Results reviewed, labs MET treatment parameters, ok to proceed with treatment.      Post Infusion Assessment:  Patient tolerated transfusion of platelets without evidence of reaction.  Patient tolerated injection of NPLATE to left lower abodmen without incident.       Discharge Plan:   Patient discharged in stable condition accompanied by: self.  Pt scheduled to return to BMT clinic next Thursday.  Pt encouraged to call triage with new onset bleeding or fevers/shortness of breath, chills.    Emelina Bose RN

## 2020-04-27 DIAGNOSIS — D69.6 THROMBOCYTOPENIA (H): ICD-10-CM

## 2020-04-27 DIAGNOSIS — Z94.81 STATUS POST BONE MARROW TRANSPLANT (H): Primary | ICD-10-CM

## 2020-04-27 DIAGNOSIS — C90.00 MULTIPLE MYELOMA NOT HAVING ACHIEVED REMISSION (H): ICD-10-CM

## 2020-04-30 ENCOUNTER — ONCOLOGY VISIT (OUTPATIENT)
Dept: TRANSPLANT | Facility: CLINIC | Age: 60
End: 2020-04-30
Attending: INTERNAL MEDICINE
Payer: COMMERCIAL

## 2020-04-30 VITALS
TEMPERATURE: 97.8 F | HEART RATE: 70 BPM | OXYGEN SATURATION: 100 % | WEIGHT: 177.8 LBS | RESPIRATION RATE: 20 BRPM | BODY MASS INDEX: 25.51 KG/M2 | SYSTOLIC BLOOD PRESSURE: 142 MMHG | DIASTOLIC BLOOD PRESSURE: 81 MMHG

## 2020-04-30 VITALS
TEMPERATURE: 97.8 F | WEIGHT: 177.69 LBS | OXYGEN SATURATION: 100 % | RESPIRATION RATE: 20 BRPM | DIASTOLIC BLOOD PRESSURE: 81 MMHG | SYSTOLIC BLOOD PRESSURE: 142 MMHG | HEART RATE: 70 BPM | BODY MASS INDEX: 25.5 KG/M2

## 2020-04-30 DIAGNOSIS — L40.50 PSORIASIS WITH ARTHROPATHY (H): Primary | ICD-10-CM

## 2020-04-30 DIAGNOSIS — D69.6 THROMBOCYTOPENIA (H): Primary | ICD-10-CM

## 2020-04-30 DIAGNOSIS — Z94.81 STATUS POST BONE MARROW TRANSPLANT (H): ICD-10-CM

## 2020-04-30 DIAGNOSIS — D69.6 THROMBOCYTOPENIA (H): ICD-10-CM

## 2020-04-30 DIAGNOSIS — C90.00 MULTIPLE MYELOMA NOT HAVING ACHIEVED REMISSION (H): ICD-10-CM

## 2020-04-30 LAB
ALBUMIN SERPL-MCNC: 4.1 G/DL (ref 3.4–5)
ALP SERPL-CCNC: 75 U/L (ref 40–150)
ALT SERPL W P-5'-P-CCNC: 22 U/L (ref 0–70)
ANION GAP SERPL CALCULATED.3IONS-SCNC: 6 MMOL/L (ref 3–14)
AST SERPL W P-5'-P-CCNC: 18 U/L (ref 0–45)
BASOPHILS # BLD AUTO: 0 10E9/L (ref 0–0.2)
BASOPHILS NFR BLD AUTO: 0.7 %
BILIRUB SERPL-MCNC: 0.4 MG/DL (ref 0.2–1.3)
BLD PROD TYP BPU: NORMAL
BLD PROD TYP BPU: NORMAL
BLD UNIT ID BPU: 0
BLOOD PRODUCT CODE: NORMAL
BPU ID: NORMAL
BUN SERPL-MCNC: 15 MG/DL (ref 7–30)
CALCIUM SERPL-MCNC: 8.9 MG/DL (ref 8.5–10.1)
CHLORIDE SERPL-SCNC: 105 MMOL/L (ref 94–109)
CO2 SERPL-SCNC: 27 MMOL/L (ref 20–32)
CREAT SERPL-MCNC: 0.88 MG/DL (ref 0.66–1.25)
DIFFERENTIAL METHOD BLD: ABNORMAL
EOSINOPHIL # BLD AUTO: 0.1 10E9/L (ref 0–0.7)
EOSINOPHIL NFR BLD AUTO: 3.3 %
ERYTHROCYTE [DISTWIDTH] IN BLOOD BY AUTOMATED COUNT: 15.3 % (ref 10–15)
GFR SERPL CREATININE-BSD FRML MDRD: >90 ML/MIN/{1.73_M2}
GLUCOSE SERPL-MCNC: 91 MG/DL (ref 70–99)
HCT VFR BLD AUTO: 27.5 % (ref 40–53)
HGB BLD-MCNC: 9.3 G/DL (ref 13.3–17.7)
IMM GRANULOCYTES # BLD: 0 10E9/L (ref 0–0.4)
IMM GRANULOCYTES NFR BLD: 0.3 %
INR PPP: 1.03 (ref 0.86–1.14)
LYMPHOCYTES # BLD AUTO: 0.8 10E9/L (ref 0.8–5.3)
LYMPHOCYTES NFR BLD AUTO: 27.4 %
MAGNESIUM SERPL-MCNC: 2 MG/DL (ref 1.6–2.3)
MCH RBC QN AUTO: 38.9 PG (ref 26.5–33)
MCHC RBC AUTO-ENTMCNC: 33.8 G/DL (ref 31.5–36.5)
MCV RBC AUTO: 115 FL (ref 78–100)
MONOCYTES # BLD AUTO: 0.5 10E9/L (ref 0–1.3)
MONOCYTES NFR BLD AUTO: 15.6 %
NEUTROPHILS # BLD AUTO: 1.6 10E9/L (ref 1.6–8.3)
NEUTROPHILS NFR BLD AUTO: 52.7 %
NRBC # BLD AUTO: 0 10*3/UL
NRBC BLD AUTO-RTO: 0 /100
NUM BPU REQUESTED: 1
PLATELET # BLD AUTO: 24 10E9/L (ref 150–450)
PLATELET # BLD EST: ABNORMAL 10*3/UL
POTASSIUM SERPL-SCNC: 3.6 MMOL/L (ref 3.4–5.3)
PROT SERPL-MCNC: 7.3 G/DL (ref 6.8–8.8)
RBC # BLD AUTO: 2.39 10E12/L (ref 4.4–5.9)
SODIUM SERPL-SCNC: 137 MMOL/L (ref 133–144)
TRANSFUSION STATUS PATIENT QL: NORMAL
TRANSFUSION STATUS PATIENT QL: NORMAL
WBC # BLD AUTO: 3.1 10E9/L (ref 4–11)

## 2020-04-30 PROCEDURE — 83735 ASSAY OF MAGNESIUM: CPT | Performed by: INTERNAL MEDICINE

## 2020-04-30 PROCEDURE — 00000402 ZZHCL STATISTIC TOTAL PROTEIN: Performed by: INTERNAL MEDICINE

## 2020-04-30 PROCEDURE — 36415 COLL VENOUS BLD VENIPUNCTURE: CPT

## 2020-04-30 PROCEDURE — 85025 COMPLETE CBC W/AUTO DIFF WBC: CPT | Performed by: INTERNAL MEDICINE

## 2020-04-30 PROCEDURE — 86334 IMMUNOFIX E-PHORESIS SERUM: CPT | Performed by: INTERNAL MEDICINE

## 2020-04-30 PROCEDURE — 25000128 H RX IP 250 OP 636: Mod: ZF | Performed by: INTERNAL MEDICINE

## 2020-04-30 PROCEDURE — 84165 PROTEIN E-PHORESIS SERUM: CPT | Performed by: INTERNAL MEDICINE

## 2020-04-30 PROCEDURE — 82784 ASSAY IGA/IGD/IGG/IGM EACH: CPT | Performed by: INTERNAL MEDICINE

## 2020-04-30 PROCEDURE — 96372 THER/PROPH/DIAG INJ SC/IM: CPT

## 2020-04-30 PROCEDURE — P9037 PLATE PHERES LEUKOREDU IRRAD: HCPCS | Performed by: PHYSICIAN ASSISTANT

## 2020-04-30 PROCEDURE — 80053 COMPREHEN METABOLIC PANEL: CPT | Performed by: INTERNAL MEDICINE

## 2020-04-30 PROCEDURE — 85610 PROTHROMBIN TIME: CPT | Performed by: INTERNAL MEDICINE

## 2020-04-30 RX ADMIN — ROMIPLOSTIM 750 MCG: 500 INJECTION, POWDER, LYOPHILIZED, FOR SOLUTION SUBCUTANEOUS at 16:04

## 2020-04-30 ASSESSMENT — PAIN SCALES - GENERAL
PAINLEVEL: NO PAIN (0)
PAINLEVEL: NO PAIN (0)

## 2020-04-30 NOTE — PROGRESS NOTES
BP (!) 142/81 (BP Location: Right arm, Patient Position: Sitting)   Pulse 70   Temp 97.8  F (36.6  C) (Oral)   Resp 20   Wt 80.6 kg (177 lb 11.1 oz)   SpO2 100%   BMI 25.50 kg/m    Wt Readings from Last 4 Encounters:   04/30/20 80.6 kg (177 lb 11.1 oz)   04/30/20 80.6 kg (177 lb 12.8 oz)   04/24/20 80.3 kg (177 lb 1.6 oz)   04/17/20 79.5 kg (175 lb 4.8 oz)     charity is receiving 4th dose at 10 mcg/kg or romplostim today with no overt side effects.  He has had no bleeding bruising fever or chills.  His energy is good though he is more achy after discontinuing his prednisone several weeks ago.  He has no GI  or respiratory symptoms and no mouth sores.  The rest of his review of systems is unrevealing.    His blood pressure is a bit high on arrival again today.  He has minimal if any petechiae in his ankles and none in conjunctiva or his oropharynx.  His lungs are clear and his heart tones are regular.  His abdomen is soft without focal tenderness hepatosplenomegaly.  He has no peripheral edema.    Laboratory testing showed slightly higher platelet count than usual but we discussed that this may be a blip unless it is sustained.  He will continue romiplostim for another few weeks on a weekly basis at 10 mcg/kg rounded to 750 mcg but will have to rethink alternative approaches if he has no ongoing improvement.  We will continue his acyclovir because he is only been off prednisone for a few weeks and it is likely suppressing his risks of shingles.    He will return once weekly and I will see him again in a few weeks time    Adebayo Lucio MD    Professor of medicine    Results for CHARITY KNIGHT (MRN [id number]) as of 4/30/2020 16:42   Ref. Range 4/30/2020 07:00 4/30/2020 07:00 4/30/2020 14:58   Sodium Latest Ref Range: 133 - 144 mmol/L   137   Potassium Latest Ref Range: 3.4 - 5.3 mmol/L   3.6   Chloride Latest Ref Range: 94 - 109 mmol/L   105   Carbon Dioxide Latest Ref Range: 20 - 32 mmol/L   27   Urea  Nitrogen Latest Ref Range: 7 - 30 mg/dL   15   Creatinine Latest Ref Range: 0.66 - 1.25 mg/dL   0.88   GFR Estimate Latest Ref Range: >60 mL/min/1.73_m2   >90   GFR Estimate If Black Latest Ref Range: >60 mL/min/1.73_m2   >90   Calcium Latest Ref Range: 8.5 - 10.1 mg/dL   8.9   Anion Gap Latest Ref Range: 3 - 14 mmol/L   6   Magnesium Latest Ref Range: 1.6 - 2.3 mg/dL   2.0   Albumin Latest Ref Range: 3.4 - 5.0 g/dL   4.1   Protein Total Latest Ref Range: 6.8 - 8.8 g/dL   7.3   Bilirubin Total Latest Ref Range: 0.2 - 1.3 mg/dL   0.4   Alkaline Phosphatase Latest Ref Range: 40 - 150 U/L   75   ALT Latest Ref Range: 0 - 70 U/L   22   AST Latest Ref Range: 0 - 45 U/L   18   Glucose Latest Ref Range: 70 - 99 mg/dL   91   WBC Latest Ref Range: 4.0 - 11.0 10e9/L   3.1 (L)   Hemoglobin Latest Ref Range: 13.3 - 17.7 g/dL   9.3 (L)   Hematocrit Latest Ref Range: 40.0 - 53.0 %   27.5 (L)   Platelet Count Latest Ref Range: 150 - 450 10e9/L   24 (LL)   RBC Count Latest Ref Range: 4.4 - 5.9 10e12/L   2.39 (L)   MCV Latest Ref Range: 78 - 100 fl   115 (H)   MCH Latest Ref Range: 26.5 - 33.0 pg   38.9 (H)   MCHC Latest Ref Range: 31.5 - 36.5 g/dL   33.8   RDW Latest Ref Range: 10.0 - 15.0 %   15.3 (H)   Diff Method Unknown   Automated Method   % Neutrophils Latest Units: %   52.7   % Lymphocytes Latest Units: %   27.4   % Monocytes Latest Units: %   15.6   % Eosinophils Latest Units: %   3.3   % Basophils Latest Units: %   0.7   % Immature Granulocytes Latest Units: %   0.3   Nucleated RBCs Latest Ref Range: 0 /100   0   Absolute Neutrophil Latest Ref Range: 1.6 - 8.3 10e9/L   1.6   Absolute Lymphocytes Latest Ref Range: 0.8 - 5.3 10e9/L   0.8   Absolute Monocytes Latest Ref Range: 0.0 - 1.3 10e9/L   0.5   Absolute Eosinophils Latest Ref Range: 0.0 - 0.7 10e9/L   0.1   Absolute Basophils Latest Ref Range: 0.0 - 0.2 10e9/L   0.0   Abs Immature Granulocytes Latest Ref Range: 0 - 0.4 10e9/L   0.0   Absolute Nucleated RBC Unknown    0.0   Platelet Estimate Unknown   Confirming automated cell count   INR Latest Ref Range: 0.86 - 1.14    1.03   Blood Component Type Unknown PLT Pheresis PlateletPheresis LeukoReduced Irradiated    Unit Number Unknown [id number]     Division Number Unknown [id number]     Status of Unit Unknown No longer available 04/30/2020 1638     Unit Status Unknown RET

## 2020-04-30 NOTE — PROGRESS NOTES
Infusion Nursing Note:  Angel Yanez presents today for Nplate.    Patient seen by provider today: Yes: Dr. Bustos.   present during visit today: Not Applicable.    Note: Pt given NPlate.  Tolerated without incident.    Intravenous Access:  No Intravenous access/labs at this visit.    Treatment Conditions:  Lab Results   Component Value Date    HGB 9.3 04/30/2020     Lab Results   Component Value Date    WBC 3.1 04/30/2020      Lab Results   Component Value Date    ANEU 1.6 04/30/2020     Lab Results   Component Value Date    PLT 24 04/30/2020      Results reviewed, labs MET treatment parameters, ok to proceed with treatment.      Post Infusion Assessment:  Patient tolerated injection without incident.       Discharge Plan:   Discharge instructions reviewed with: Patient.  Patient and/or family verbalized understanding of discharge instructions and all questions answered.  Patient discharged in stable condition accompanied by: self.  Scheduling to follow up with patient.  Departure Mode: Ambulatory.    Rosana Brown RN

## 2020-05-01 LAB
ALBUMIN SERPL ELPH-MCNC: 4.5 G/DL (ref 3.7–5.1)
ALPHA1 GLOB SERPL ELPH-MCNC: 0.3 G/DL (ref 0.2–0.4)
ALPHA2 GLOB SERPL ELPH-MCNC: 0.7 G/DL (ref 0.5–0.9)
B-GLOBULIN SERPL ELPH-MCNC: 0.6 G/DL (ref 0.6–1)
CMV DNA SPEC NAA+PROBE-ACNC: NORMAL [IU]/ML
CMV DNA SPEC NAA+PROBE-LOG#: NORMAL {LOG_IU}/ML
GAMMA GLOB SERPL ELPH-MCNC: 0.7 G/DL (ref 0.7–1.6)
IGA SERPL-MCNC: 29 MG/DL (ref 84–499)
IGG SERPL-MCNC: 726 MG/DL (ref 610–1616)
IGM SERPL-MCNC: 65 MG/DL (ref 35–242)
M PROTEIN SERPL ELPH-MCNC: 0 G/DL
PROT PATTERN SERPL ELPH-IMP: NORMAL
PROT PATTERN SERPL IFE-IMP: ABNORMAL
SPECIMEN SOURCE: NORMAL

## 2020-05-08 ENCOUNTER — APPOINTMENT (OUTPATIENT)
Dept: LAB | Facility: CLINIC | Age: 60
End: 2020-05-08
Attending: INTERNAL MEDICINE
Payer: COMMERCIAL

## 2020-05-08 ENCOUNTER — ONCOLOGY VISIT (OUTPATIENT)
Dept: TRANSPLANT | Facility: CLINIC | Age: 60
End: 2020-05-08
Attending: INTERNAL MEDICINE
Payer: COMMERCIAL

## 2020-05-08 VITALS
RESPIRATION RATE: 16 BRPM | TEMPERATURE: 96.8 F | SYSTOLIC BLOOD PRESSURE: 142 MMHG | BODY MASS INDEX: 25.48 KG/M2 | DIASTOLIC BLOOD PRESSURE: 80 MMHG | WEIGHT: 177.6 LBS | HEART RATE: 68 BPM | OXYGEN SATURATION: 100 %

## 2020-05-08 DIAGNOSIS — C90.00 MULTIPLE MYELOMA NOT HAVING ACHIEVED REMISSION (H): ICD-10-CM

## 2020-05-08 DIAGNOSIS — C90.01 MULTIPLE MYELOMA IN REMISSION (H): Primary | ICD-10-CM

## 2020-05-08 DIAGNOSIS — L40.50 PSORIASIS WITH ARTHROPATHY (H): ICD-10-CM

## 2020-05-08 DIAGNOSIS — D69.6 THROMBOCYTOPENIA (H): ICD-10-CM

## 2020-05-08 PROCEDURE — 25000128 H RX IP 250 OP 636: Mod: JW,ZF | Performed by: STUDENT IN AN ORGANIZED HEALTH CARE EDUCATION/TRAINING PROGRAM

## 2020-05-08 PROCEDURE — G0463 HOSPITAL OUTPT CLINIC VISIT: HCPCS | Mod: 25

## 2020-05-08 PROCEDURE — G0463 HOSPITAL OUTPT CLINIC VISIT: HCPCS | Mod: ZF

## 2020-05-08 PROCEDURE — 96372 THER/PROPH/DIAG INJ SC/IM: CPT

## 2020-05-08 RX ORDER — ALBUTEROL SULFATE 5 MG/ML
2.5 SOLUTION RESPIRATORY (INHALATION)
Status: CANCELLED
Start: 2020-05-15

## 2020-05-08 RX ORDER — FLUCONAZOLE 100 MG/1
100 TABLET ORAL DAILY
Qty: 30 TABLET | Refills: 0 | Status: SHIPPED | OUTPATIENT
Start: 2020-05-08 | End: 2020-05-21

## 2020-05-08 RX ORDER — LISINOPRIL 10 MG/1
10 TABLET ORAL DAILY
Qty: 30 TABLET | Refills: 3 | Status: SHIPPED | OUTPATIENT
Start: 2020-05-08 | End: 2020-06-04

## 2020-05-08 RX ORDER — ACYCLOVIR 800 MG/1
800 TABLET ORAL 2 TIMES DAILY
Qty: 60 TABLET | Refills: 1 | Status: SHIPPED | OUTPATIENT
Start: 2020-05-08 | End: 2020-05-21

## 2020-05-08 RX ORDER — PENTAMIDINE ISETHIONATE 300 MG/300MG
300 INHALANT RESPIRATORY (INHALATION)
Status: CANCELLED
Start: 2020-05-15

## 2020-05-08 RX ORDER — HEPARIN SODIUM,PORCINE 10 UNIT/ML
5 VIAL (ML) INTRAVENOUS
Status: CANCELLED | OUTPATIENT
Start: 2020-05-15

## 2020-05-08 RX ADMIN — ROMIPLOSTIM 565 MCG: 125 INJECTION, POWDER, LYOPHILIZED, FOR SOLUTION SUBCUTANEOUS at 13:16

## 2020-05-08 ASSESSMENT — PAIN SCALES - GENERAL: PAINLEVEL: NO PAIN (0)

## 2020-05-08 NOTE — PROGRESS NOTES
BMT Clinic Progress Note    Patient ID: Angel Yanez is a 58 yo man D+357 s/p 2nd autologous transplant for MM.     Interval history:     Guille returns for nplate and follow up. He is feeling good.  Had a scant nose bleed earlier this week that lasted for about an hour & stopped on its own.  No other bleeding.   Afebrile with no cough or congestion.  Eating with no N/V/D. C/O some jaw pain, maybe from grinding his teeth.      ROS: 8 point ROS neg unless specified above.       Physical Exam:   Blood pressure (!) 142/80, pulse 68, temperature 96.8  F (36  C), temperature source Oral, resp. rate 16, weight 80.6 kg (177 lb 9.6 oz), SpO2 100 %.      General: NAD, engaged and talkative.   ENT: oral mucosa moist without ulceration, No OP erythema or petechiae.  Lungs: CTAB  Cardiovascular: RRR, S1, S2, no m/r/g  GI/Abdomen: +BS, soft, nontender, non-distended  Skin: No rashes or petechaie.  Scattered ecchymosis  MSK/Extremities: Warm, well perfused.  Neurologic: alert, and conversant    Data:     Lab Results   Component Value Date    WBC 3.1 (L) 04/30/2020    ANEU 1.6 04/30/2020    HGB 9.3 (L) 04/30/2020    HCT 27.5 (L) 04/30/2020    PLT 24 (LL) 04/30/2020     04/30/2020    POTASSIUM 3.6 04/30/2020    CHLORIDE 105 04/30/2020    CO2 27 04/30/2020    GLC 91 04/30/2020    BUN 15 04/30/2020    CR 0.88 04/30/2020    MAG 2.0 04/30/2020    INR 1.03 04/30/2020    BILITOTAL 0.4 04/30/2020    AST 18 04/30/2020    ALT 22 04/30/2020    ALKPHOS 75 04/30/2020    PROTTOTAL 7.3 04/30/2020    ALBUMIN 4.1 04/30/2020       Assessment and Plan:     Angel Yanez is a 58 yo man D+357 s/p 2nd autologous transplant for MM with ongoing cytopenias.     1.  BMT/MM:   Slow engraftment; cell dose was only 0.639x10^6. (Marrow [place] - known poor cell dose as failed chemo-mobilization 1/2019.)   - day +180 bone marrow biopsy 11/6/19 which showed no morphologic or immunophenotypic evidence of plasma cell neoplasm. His light chains were not  elevated and he had no monoclonal protein in the serum.  He is in clinical remission.  - PET in 8/2019 clear.   - PB FLOW 11/23: No overt aberrant immunophenotype on T cells.  Rare to absent mature B cells and plasma cells.    - BMBX 3/6:  No morphologic or immunophenotypic evidence of recurrent/persistent plasma cell neoplasm. Cellular marrow with trilineage hematopoiesis, dyserythropoiesis, and no increase in blasts.  No increase in ring sideroblasts.  Less than 5% polytypic plasma cells .  Peripheral blood showing marked macrocytic anemia, lymphocytopenia, and marked thrombocytopenia     2.  HEME: Keep Hgb>8g/dL, plt >10k.   - WBC stable, ANC okay at 1.5. Last GCSF 3/3/2020.   - Persistant profound thrombocytopenia.  Started Nplate 3/19. Now on max dose 10mcg/kg   -  previous trial of promacta not helpful. Stopped 12/6/19. Trial of prednisone not helpful, Pred taper compelte on 4/16.    - plt stably improved 20s.    3.  ID:   - FUOs: Extensive ID work up- no etiology identified. Now afebrile     Prophy: ACV, Fluconazole & pentamidine (3/26). Per Dr. Lucio OK to discontinue PCP proph  - influenza vaccination given 11/26/19     4. GI:  no complaints.      5. CV: Hx of steroid induced HTN, remains hypertensive now.  Cont Lisinopril 10mg daily     6.  FEN/Renal: Cr and lytes WNL     7.  Mood: Continue Paxil.     8. FUOs- Some kind of auto immune process DDX:  Sarcoidosis? Underwent EBUS FNA which was negative for granulomas however unlikely you could see this on just fluid aspirate so would not consider          RTC   5/15 for lab, provider & Nplate  5/19 for restaging  5/21 for survivorship and review w/ Dr. Naveed neff-c  [phone]

## 2020-05-08 NOTE — NURSING NOTE
"Oncology Rooming Note    May 8, 2020 12:21 PM   Angel Yanez is a 59 year old male who presents for:    Chief Complaint   Patient presents with     Blood Draw     labs drawn with vpt by rn.  vs taken     RECHECK     post bmt for MM here for labs and md visit     Initial Vitals: BP (!) 142/80 (BP Location: Right arm, Patient Position: Sitting, Cuff Size: Adult Regular)   Pulse 68   Temp 96.8  F (36  C) (Oral)   Resp 16   Wt 80.6 kg (177 lb 9.6 oz)   SpO2 100%   BMI 25.48 kg/m   Estimated body mass index is 25.48 kg/m  as calculated from the following:    Height as of 2/4/20: 1.778 m (5' 10\").    Weight as of this encounter: 80.6 kg (177 lb 9.6 oz). Body surface area is 2 meters squared.  No Pain (0) Comment: Data Unavailable   No LMP for male patient.  Allergies reviewed: Yes  Medications reviewed: Yes    Medications: Medication refills not needed today.  Pharmacy name entered into EPIC:    [facility] - [place], [place] - [address]  [facility] - [place], [place] - [address]    Clinical concerns: none       Eloisa King RN              "

## 2020-05-13 DIAGNOSIS — C90.00 MULTIPLE MYELOMA NOT HAVING ACHIEVED REMISSION (H): ICD-10-CM

## 2020-05-15 ENCOUNTER — APPOINTMENT (OUTPATIENT)
Dept: LAB | Facility: CLINIC | Age: 60
End: 2020-05-15
Attending: INTERNAL MEDICINE
Payer: COMMERCIAL

## 2020-05-15 ENCOUNTER — ONCOLOGY VISIT (OUTPATIENT)
Dept: TRANSPLANT | Facility: CLINIC | Age: 60
End: 2020-05-15
Attending: INTERNAL MEDICINE
Payer: COMMERCIAL

## 2020-05-15 VITALS
RESPIRATION RATE: 16 BRPM | BODY MASS INDEX: 25.4 KG/M2 | OXYGEN SATURATION: 100 % | WEIGHT: 177 LBS | TEMPERATURE: 96.4 F | DIASTOLIC BLOOD PRESSURE: 87 MMHG | SYSTOLIC BLOOD PRESSURE: 151 MMHG | HEART RATE: 68 BPM

## 2020-05-15 DIAGNOSIS — D69.6 THROMBOCYTOPENIA (H): Primary | ICD-10-CM

## 2020-05-15 DIAGNOSIS — Z94.81 STATUS POST BONE MARROW TRANSPLANT (H): ICD-10-CM

## 2020-05-15 PROCEDURE — G0463 HOSPITAL OUTPT CLINIC VISIT: HCPCS | Mod: 25

## 2020-05-15 PROCEDURE — G0463 HOSPITAL OUTPT CLINIC VISIT: HCPCS

## 2020-05-15 PROCEDURE — 96372 THER/PROPH/DIAG INJ SC/IM: CPT

## 2020-05-15 PROCEDURE — 25000128 H RX IP 250 OP 636: Mod: ZF | Performed by: PHYSICIAN ASSISTANT

## 2020-05-15 PROCEDURE — 36415 COLL VENOUS BLD VENIPUNCTURE: CPT

## 2020-05-15 RX ADMIN — ROMIPLOSTIM 750 MCG: 500 INJECTION, POWDER, LYOPHILIZED, FOR SOLUTION SUBCUTANEOUS at 12:57

## 2020-05-15 ASSESSMENT — PAIN SCALES - GENERAL: PAINLEVEL: NO PAIN (0)

## 2020-05-15 NOTE — NURSING NOTE
"Oncology Rooming Note    May 15, 2020 12:18 PM   Angel Yanez is a 59 year old male who presents for:    Chief Complaint   Patient presents with     Blood Draw     Labs drawn via  by RN in lab. VS taken. Patient checked in for next appt.     RECHECK     Pt is here for a rtn for MM     Initial Vitals: Blood Pressure (Abnormal) 151/87 (BP Location: Right arm, Patient Position: Sitting, Cuff Size: Adult Regular)   Pulse 68   Temperature 96.4  F (35.8  C) (Tympanic)   Respiration 16   Weight 80.3 kg (177 lb)   Oxygen Saturation 100%   Body Mass Index 25.40 kg/m   Estimated body mass index is 25.4 kg/m  as calculated from the following:    Height as of 2/4/20: 1.778 m (5' 10\").    Weight as of this encounter: 80.3 kg (177 lb). Body surface area is 1.99 meters squared.  No Pain (0) Comment: Data Unavailable   No LMP for male patient.  Allergies reviewed: Yes  Medications reviewed: Yes    Medications: Medication refills not needed today.  Pharmacy name entered into EPIC:    [facility] - [place], [place] - [address]  [facility] - [place], [place] - [address]    Clinical concerns: none       Shoshana Salazar MA            "

## 2020-05-15 NOTE — PROGRESS NOTES
BMT Clinic Progress Note    Patient ID: Angel Yanez is a 60 yo man D+364 s/p 2nd autologous transplant for MM.     Interval history:     Guille returns for nplate and follow up. He is feeling good.  No bleeding.   Afebrile with no cough or congestion.  Eating with no N/V/D. No new medical complaints.     ROS: 8 point ROS neg unless specified above.       Physical Exam:   Blood pressure (!) 151/87, pulse 68, temperature 96.4  F (35.8  C), temperature source Tympanic, resp. rate 16, weight 80.3 kg (177 lb), SpO2 100 %.    Wt Readings from Last 4 Encounters:   05/15/20 80.3 kg (177 lb)   05/08/20 80.6 kg (177 lb 9.6 oz)   04/30/20 80.6 kg (177 lb 11.1 oz)   04/30/20 80.6 kg (177 lb 12.8 oz)     General: NAD, engaged and talkative.   ENT: oral mucosa moist without ulceration, No OP erythema or petechiae.  Lungs: CTAB  Cardiovascular: RRR, S1, S2, no m/r/g  GI/Abdomen: +BS, soft, nontender, non-distended  Skin: No rashes or petechaie.    MSK/Extremities: Warm, well perfused.  Neurologic: alert, and conversant    Data:     Lab Results   Component Value Date    WBC 3.2 (L) 05/08/2020    ANEU 1.5 (L) 05/08/2020    HGB 9.6 (L) 05/08/2020    HCT 29.6 (L) 05/08/2020    PLT 23 (LL) 05/08/2020     04/30/2020    POTASSIUM 3.6 04/30/2020    CHLORIDE 105 04/30/2020    CO2 27 04/30/2020    GLC 91 04/30/2020    BUN 15 04/30/2020    CR 0.88 04/30/2020    MAG 2.0 04/30/2020    INR 1.03 04/30/2020    BILITOTAL 0.4 04/30/2020    AST 18 04/30/2020    ALT 22 04/30/2020    ALKPHOS 75 04/30/2020    PROTTOTAL 7.3 04/30/2020    ALBUMIN 4.1 04/30/2020       Assessment and Plan:     Angel Yanez is a 60 yo man D+364 s/p 2nd autologous transplant for MM with ongoing cytopenias.     1.  BMT/MM:   Slow engraftment; cell dose was only 0.639x10^6. (Marrow [place] - known poor cell dose as failed chemo-mobilization 1/2019.)   - day +180 bone marrow biopsy 11/6/19 which showed no morphologic or immunophenotypic evidence of plasma cell  neoplasm. His light chains were not elevated and he had no monoclonal protein in the serum.  He is in clinical remission.  - PET in 8/2019 clear.   - PB FLOW 11/23: No overt aberrant immunophenotype on T cells.  Rare to absent mature B cells and plasma cells.    - BMBX 3/6:  No morphologic or immunophenotypic evidence of recurrent/persistent plasma cell neoplasm. Cellular marrow with trilineage hematopoiesis, dyserythropoiesis, and no increase in blasts.  No increase in ring sideroblasts.  Less than 5% polytypic plasma cells .  Peripheral blood showing marked macrocytic anemia, lymphocytopenia, and marked thrombocytopenia     2.  HEME: Keep Hgb>8g/dL, plt >10k.   - All counts improved today.  - Persistant profound thrombocytopenia.  Started Nplate 3/19. Now on max dose 10mcg/kg   -  previous trial of promacta not helpful. Stopped 12/6/19. Trial of prednisone not helpful, Pred taper compelte on 4/16.    - plt  improved 30s.    3.  ID:   - FUOs: Extensive ID work up- no etiology identified. Now afebrile     Prophy: ACV, Fluconazole & pentamidine (3/26). Per Dr. Lucio OK to discontinue PCP proph  - influenza vaccination given 11/26/19     4. GI:  no complaints.      5. CV: Hx of steroid induced HTN, remains hypertensive now.  Cont Lisinopril 10mg daily. Note of htn today.  If continues to be elevated, may need additional treatment.     6.  FEN/Renal: Cr and lytes WNL     7.  Mood: Continue Paxil.     8. FUOs-  Some kind of auto immune process DDX:  Sarcoidosis? Underwent EBUS FNA which was negative for granulomas however unlikely you could see this on just fluid aspirate so would not consider         RTC   5/19 for restaging  5/21 for survivorship and review w/ Dr. Naveed neff-c  [phone]

## 2020-05-18 NOTE — PROGRESS NOTES
BMT ONC Adult Bone Marrow Biopsy Procedure Note  May 18, 2020  /70 (BP Location: Right arm, Patient Position: Sitting, Cuff Size: Adult Regular)   Pulse 102   Temp 99  F (37.2  C)   Resp 18   Wt 80 kg (176 lb 4.8 oz)   SpO2 99%   BMI 25.30 kg/m       Learning needs assessment complete within 12 months? YES    DIAGNOSIS: Multiple Myeloma, 1 year s/p second autologous stem cell transplant     PROCEDURE: Unilateral Bone Marrow Biopsy and Unilateral Aspirate    LOCATION: [facility] 2nd Floor    Patient s identification was positively verified by verbal identification and invasive procedure safety checklist was completed. Informed consent was obtained. Pt declined pre-medication, patient was placed in the prone position and prepped and draped in a sterile manner. Approximately 15 cc of 1% Lidocaine was used over the right posterior iliac spine. Following this a 3 mm incision was made. Trephine bone marrow core(s) was (were) obtained from the [facility]. Bone marrow aspirates were obtained from the [facility]. Aspirates were sent for morphology, immunophenotyping, cytogenetics and molecular diagnostics. A total of approximately 20 ml of marrow was aspirated. Following this procedure a sterile dressing was applied to the bone marrow biopsy site(s). The patient was placed in the supine position to maintain pressure on the biopsy site. Post-procedure wound care instructions were given.     Complications: Used the 8 guage    Pre-procedural pain: 0 out of 10 on the numeric pain rating scale.     Procedural pain: 3 out of 10 on the numeric pain rating scale.     Post-procedural pain assessment: 0 out of 10 on the numeric pain rating scale.     Interventions: NO    Length of procedure:20 minutes or less      Procedure performed by: Fei Meng PA-C

## 2020-05-19 ENCOUNTER — HOSPITAL ENCOUNTER (OUTPATIENT)
Dept: PET IMAGING | Facility: CLINIC | Age: 60
End: 2020-05-19
Attending: INTERNAL MEDICINE
Payer: COMMERCIAL

## 2020-05-19 ENCOUNTER — HOSPITAL ENCOUNTER (OUTPATIENT)
Dept: GENERAL RADIOLOGY | Facility: CLINIC | Age: 60
End: 2020-05-19
Attending: INTERNAL MEDICINE
Payer: COMMERCIAL

## 2020-05-19 ENCOUNTER — OFFICE VISIT (OUTPATIENT)
Dept: TRANSPLANT | Facility: CLINIC | Age: 60
End: 2020-05-19
Attending: PHYSICIAN ASSISTANT
Payer: COMMERCIAL

## 2020-05-19 ENCOUNTER — APPOINTMENT (OUTPATIENT)
Dept: LAB | Facility: CLINIC | Age: 60
End: 2020-05-19
Attending: INTERNAL MEDICINE
Payer: COMMERCIAL

## 2020-05-19 VITALS
RESPIRATION RATE: 18 BRPM | SYSTOLIC BLOOD PRESSURE: 110 MMHG | TEMPERATURE: 99 F | HEART RATE: 102 BPM | BODY MASS INDEX: 25.3 KG/M2 | DIASTOLIC BLOOD PRESSURE: 70 MMHG | WEIGHT: 176.3 LBS | OXYGEN SATURATION: 99 %

## 2020-05-19 DIAGNOSIS — C90.00 MULTIPLE MYELOMA NOT HAVING ACHIEVED REMISSION (H): ICD-10-CM

## 2020-05-19 DIAGNOSIS — D69.6 THROMBOCYTOPENIA (H): ICD-10-CM

## 2020-05-19 DIAGNOSIS — C90.00 MULTIPLE MYELOMA NOT HAVING ACHIEVED REMISSION (H): Primary | ICD-10-CM

## 2020-05-19 DIAGNOSIS — Z94.81 STATUS POST BONE MARROW TRANSPLANT (H): ICD-10-CM

## 2020-05-19 DIAGNOSIS — D80.1 HYPOGAMMAGLOBULINEMIA (H): ICD-10-CM

## 2020-05-19 LAB
ALBUMIN SERPL-MCNC: 4 G/DL (ref 3.4–5)
ALP SERPL-CCNC: 71 U/L (ref 40–150)
ALT SERPL W P-5'-P-CCNC: 21 U/L (ref 0–70)
ANION GAP SERPL CALCULATED.3IONS-SCNC: 8 MMOL/L (ref 3–14)
AST SERPL W P-5'-P-CCNC: 17 U/L (ref 0–45)
BASOPHILS # BLD AUTO: 0 10E9/L (ref 0–0.2)
BASOPHILS NFR BLD AUTO: 0.9 %
BILIRUB SERPL-MCNC: 0.3 MG/DL (ref 0.2–1.3)
BUN SERPL-MCNC: 21 MG/DL (ref 7–30)
CALCIUM SERPL-MCNC: 9.4 MG/DL (ref 8.5–10.1)
CHLORIDE SERPL-SCNC: 109 MMOL/L (ref 94–109)
CO2 SERPL-SCNC: 24 MMOL/L (ref 20–32)
CREAT SERPL-MCNC: 0.91 MG/DL (ref 0.66–1.25)
DIFFERENTIAL METHOD BLD: ABNORMAL
EOSINOPHIL # BLD AUTO: 0.2 10E9/L (ref 0–0.7)
EOSINOPHIL NFR BLD AUTO: 6.1 %
ERYTHROCYTE [DISTWIDTH] IN BLOOD BY AUTOMATED COUNT: 13.4 % (ref 10–15)
GFR SERPL CREATININE-BSD FRML MDRD: >90 ML/MIN/{1.73_M2}
GLUCOSE SERPL-MCNC: 87 MG/DL (ref 70–99)
HCT VFR BLD AUTO: 32 % (ref 40–53)
HGB BLD-MCNC: 10.7 G/DL (ref 13.3–17.7)
IMM GRANULOCYTES # BLD: 0 10E9/L (ref 0–0.4)
IMM GRANULOCYTES NFR BLD: 0.3 %
LDH SERPL L TO P-CCNC: 182 U/L (ref 85–227)
LYMPHOCYTES # BLD AUTO: 0.8 10E9/L (ref 0.8–5.3)
LYMPHOCYTES NFR BLD AUTO: 23.9 %
MAGNESIUM SERPL-MCNC: 2.2 MG/DL (ref 1.6–2.3)
MCH RBC QN AUTO: 37.8 PG (ref 26.5–33)
MCHC RBC AUTO-ENTMCNC: 33.4 G/DL (ref 31.5–36.5)
MCV RBC AUTO: 113 FL (ref 78–100)
MONOCYTES # BLD AUTO: 0.6 10E9/L (ref 0–1.3)
MONOCYTES NFR BLD AUTO: 16 %
NEUTROPHILS # BLD AUTO: 1.8 10E9/L (ref 1.6–8.3)
NEUTROPHILS NFR BLD AUTO: 52.8 %
NRBC # BLD AUTO: 0 10*3/UL
NRBC BLD AUTO-RTO: 0 /100
PHOSPHATE SERPL-MCNC: 3.3 MG/DL (ref 2.5–4.5)
PLATELET # BLD AUTO: 49 10E9/L (ref 150–450)
PLATELET # BLD EST: ABNORMAL 10*3/UL
POTASSIUM SERPL-SCNC: 3.9 MMOL/L (ref 3.4–5.3)
PROT SERPL-MCNC: 7 G/DL (ref 6.8–8.8)
RBC # BLD AUTO: 2.83 10E12/L (ref 4.4–5.9)
SODIUM SERPL-SCNC: 141 MMOL/L (ref 133–144)
URATE SERPL-MCNC: 5.6 MG/DL (ref 3.5–7.2)
WBC # BLD AUTO: 3.4 10E9/L (ref 4–11)

## 2020-05-19 PROCEDURE — 00000161 ZZHCL STATISTIC H-SPHEME PROCESS B/S: Performed by: INTERNAL MEDICINE

## 2020-05-19 PROCEDURE — 82784 ASSAY IGA/IGD/IGG/IGM EACH: CPT | Performed by: INTERNAL MEDICINE

## 2020-05-19 PROCEDURE — 88275 CYTOGENETICS 100-300: CPT | Performed by: INTERNAL MEDICINE

## 2020-05-19 PROCEDURE — 88237 TISSUE CULTURE BONE MARROW: CPT | Performed by: INTERNAL MEDICINE

## 2020-05-19 PROCEDURE — 88271 CYTOGENETICS DNA PROBE: CPT | Performed by: INTERNAL MEDICINE

## 2020-05-19 PROCEDURE — 80053 COMPREHEN METABOLIC PANEL: CPT | Performed by: INTERNAL MEDICINE

## 2020-05-19 PROCEDURE — 34300033 ZZH RX 343: Performed by: INTERNAL MEDICINE

## 2020-05-19 PROCEDURE — 40000795 ZZHCL STATISTIC DNA PROCESS AND HOLD: Performed by: INTERNAL MEDICINE

## 2020-05-19 PROCEDURE — 88185 FLOWCYTOMETRY/TC ADD-ON: CPT | Performed by: INTERNAL MEDICINE

## 2020-05-19 PROCEDURE — 82306 VITAMIN D 25 HYDROXY: CPT | Performed by: INTERNAL MEDICINE

## 2020-05-19 PROCEDURE — 88280 CHROMOSOME KARYOTYPE STUDY: CPT | Performed by: INTERNAL MEDICINE

## 2020-05-19 PROCEDURE — 83883 ASSAY NEPHELOMETRY NOT SPEC: CPT | Performed by: INTERNAL MEDICINE

## 2020-05-19 PROCEDURE — 88161 CYTOPATH SMEAR OTHER SOURCE: CPT | Performed by: INTERNAL MEDICINE

## 2020-05-19 PROCEDURE — 88342 IMHCHEM/IMCYTCHM 1ST ANTB: CPT | Mod: XU | Performed by: INTERNAL MEDICINE

## 2020-05-19 PROCEDURE — 86334 IMMUNOFIX E-PHORESIS SERUM: CPT | Performed by: INTERNAL MEDICINE

## 2020-05-19 PROCEDURE — 83615 LACTATE (LD) (LDH) ENZYME: CPT | Performed by: INTERNAL MEDICINE

## 2020-05-19 PROCEDURE — 88305 TISSUE EXAM BY PATHOLOGIST: CPT | Performed by: INTERNAL MEDICINE

## 2020-05-19 PROCEDURE — 84165 PROTEIN E-PHORESIS SERUM: CPT | Performed by: INTERNAL MEDICINE

## 2020-05-19 PROCEDURE — 88341 IMHCHEM/IMCYTCHM EA ADD ANTB: CPT | Performed by: INTERNAL MEDICINE

## 2020-05-19 PROCEDURE — 77075 RADEX OSSEOUS SURVEY COMPL: CPT

## 2020-05-19 PROCEDURE — 83735 ASSAY OF MAGNESIUM: CPT | Performed by: INTERNAL MEDICINE

## 2020-05-19 PROCEDURE — 88184 FLOWCYTOMETRY/ TC 1 MARKER: CPT | Performed by: INTERNAL MEDICINE

## 2020-05-19 PROCEDURE — 40001003 ZZHCL STATISTIC FLOW INT 2-8 ABY TC 88187: Performed by: INTERNAL MEDICINE

## 2020-05-19 PROCEDURE — A9552 F18 FDG: HCPCS | Performed by: INTERNAL MEDICINE

## 2020-05-19 PROCEDURE — 40000611 ZZHCL STATISTIC MORPHOLOGY W/INTERP HEMEPATH TC 85060: Performed by: INTERNAL MEDICINE

## 2020-05-19 PROCEDURE — 40000951 ZZHCL STATISTIC BONE MARROW INTERP TC 85097: Performed by: INTERNAL MEDICINE

## 2020-05-19 PROCEDURE — 84550 ASSAY OF BLOOD/URIC ACID: CPT | Performed by: INTERNAL MEDICINE

## 2020-05-19 PROCEDURE — 78816 PET IMAGE W/CT FULL BODY: CPT | Mod: PS

## 2020-05-19 PROCEDURE — 38222 DX BONE MARROW BX & ASPIR: CPT | Mod: ZF

## 2020-05-19 PROCEDURE — 84100 ASSAY OF PHOSPHORUS: CPT | Performed by: INTERNAL MEDICINE

## 2020-05-19 PROCEDURE — 88264 CHROMOSOME ANALYSIS 20-25: CPT | Performed by: INTERNAL MEDICINE

## 2020-05-19 PROCEDURE — 88311 DECALCIFY TISSUE: CPT | Performed by: INTERNAL MEDICINE

## 2020-05-19 PROCEDURE — 36415 COLL VENOUS BLD VENIPUNCTURE: CPT

## 2020-05-19 PROCEDURE — 85025 COMPLETE CBC W/AUTO DIFF WBC: CPT | Performed by: INTERNAL MEDICINE

## 2020-05-19 PROCEDURE — 00000402 ZZHCL STATISTIC TOTAL PROTEIN: Performed by: INTERNAL MEDICINE

## 2020-05-19 RX ADMIN — FLUDEOXYGLUCOSE F-18 10.53 MCI.: 500 INJECTION, SOLUTION INTRAVENOUS at 10:45

## 2020-05-19 ASSESSMENT — PAIN SCALES - GENERAL: PAINLEVEL: NO PAIN (0)

## 2020-05-19 NOTE — NURSING NOTE
"Oncology Rooming Note    May 19, 2020 1:30 PM   Angel Yanez is a 59 year old male who presents for:    Chief Complaint   Patient presents with     Blood Draw     VS done, labs collected in clinic by BMT RN.  Hx MM s/p transplant.     Bone Marrow Biopsy     BMBX, hx MM s/p transplant.     Initial Vitals: /70 (BP Location: Right arm, Patient Position: Sitting, Cuff Size: Adult Regular)   Pulse 102   Temp 99  F (37.2  C)   Resp 18   Wt 80 kg (176 lb 4.8 oz)   SpO2 99%   BMI 25.30 kg/m   Estimated body mass index is 25.3 kg/m  as calculated from the following:    Height as of 2/4/20: 1.778 m (5' 10\").    Weight as of this encounter: 80 kg (176 lb 4.8 oz). Body surface area is 1.99 meters squared.  No Pain (0) Comment: Data Unavailable   No LMP for male patient.  Allergies reviewed: Yes  Medications reviewed: Yes    Medications: Medication refills not needed today.  Pharmacy name entered into EPIC:    [facility] - [place], [place] - [address] [facility] - [place], [place] - [address]    Clinical concerns: None       Isamar Tam RN              "

## 2020-05-19 NOTE — PROGRESS NOTES
Patient supine for 30 minutes following biopsy. After 30 minutes, dressing clean, dry and intact. Vital signs stable. See DOC flow sheets for details. Left ambulatory with family member.        .

## 2020-05-20 LAB
ALBUMIN SERPL ELPH-MCNC: 4.4 G/DL (ref 3.7–5.1)
ALPHA1 GLOB SERPL ELPH-MCNC: 0.3 G/DL (ref 0.2–0.4)
ALPHA2 GLOB SERPL ELPH-MCNC: 0.7 G/DL (ref 0.5–0.9)
B-GLOBULIN SERPL ELPH-MCNC: 0.6 G/DL (ref 0.6–1)
COPATH REPORT: NORMAL
COPATH REPORT: NORMAL
GAMMA GLOB SERPL ELPH-MCNC: 0.6 G/DL (ref 0.7–1.6)
IGA SERPL-MCNC: 26 MG/DL (ref 84–499)
IGG SERPL-MCNC: 719 MG/DL (ref 610–1616)
IGM SERPL-MCNC: 59 MG/DL (ref 35–242)
KAPPA LC UR-MCNC: 0.79 MG/DL (ref 0.33–1.94)
KAPPA LC/LAMBDA SER: 1.14 {RATIO} (ref 0.26–1.65)
LAMBDA LC SERPL-MCNC: 0.69 MG/DL (ref 0.57–2.63)
M PROTEIN SERPL ELPH-MCNC: 0 G/DL
PROT PATTERN SERPL ELPH-IMP: ABNORMAL
PROT PATTERN SERPL IFE-IMP: NORMAL

## 2020-05-20 NOTE — PROGRESS NOTES
"Angel Yanez is a 59 year old male who is being evaluated via a billable video visit.      The patient has been notified of following:     \"This video visit will be conducted via a call between you and your physician/provider. We have found that certain health care needs can be provided without the need for an in-person physical exam.  This service lets us provide the care you need with a video conversation.  If a prescription is necessary we can send it directly to your pharmacy.  If lab work is needed we can place an order for that and you can then stop by our lab to have the test done at a later time.    Video visits are billed at different rates depending on your insurance coverage.  Please reach out to your insurance provider with any questions.    If during the course of the call the physician/provider feels a video visit is not appropriate, you will not be charged for this service.\"    Patient has given verbal consent for Video visit? Yes    How would you like to obtain your AVS? MyChart    Patient would like the video invitation sent by: Text to cell phone: [phone]    Will anyone else be joining your video visit? No        I have reviewed and updated the patient's allergies and medication list.    Concerns: No  Refills: No    Secondary Video Option (Doximity), send text message to: [phone]    Darlin Edgar LPN    Done by Phone, call length 30 minutes                      BMT 1-Year Post-Autologous Stem Cell Transplant   Survivorship Care Plan    Date: May 20, 2020    Treatment Team:  Patient Care Team:  Ayana Love PA-C as PCP - General (Physician Assistant)  Adebayo Lucio MD as BMT Physician (Transplant)  Su Pate RN as BMT Nurse Coordinator (Transplant)  Aliya Collado as Referring Physician (Internal Medicine)  Brooke Lunsford as Registered Nurse (Oncology)  Janine Loco LICSW as  ( - Clinical)  Kody Mukherjee MD as MD (Hematology & " Oncology)    Date of Transplant: 5/17/19    Transplant Essential Data:  Diagnosis IgG Kappa MM  HCT Type Autologous    Prep Regimen Melphalan  Clinical Trials   MT- 2017- 29 - CMV MVA Triplex vaccine    MT-2016-35 - ASCT for Myeloma     MT 2018-05R - Research Database study         Oncology Treatment History:  Mr. Yanez is a 58-year-old man with multiple myeloma, s/p Owen/Dex, auto 2005, relapse 2018, s/p VRD with OR. Failed Cytoxan/GCSF/Mozobil priming in January. Received Hiral/Tip/Dex with minimal residual disease on recent marrow, now here for BM harvest for second auto. He has a mild pressure feeling at harvest sites, but no edith pain. No recent fevers or URI symptoms.     Treatment/Chemotherapy Number of Cycles Date Range Outcomes & Complications   Thal/Dex         Melphalan Auto    2/2005 No significant complications; CR until 2016 (slow rise in Mspike until 2018)   RVD 8 cycles 4/2018-12/2018 Good Response   Cytoxan Chemopriming   1/2019 admission  failed to mobilize and had 10% residual myeloma cells in marrow   Hiral/velcade/dex   2-4/2019     Melphalan (transplant prep) 1 5/2019 pending     Post-Transplant Treatment or Maintenance Therapy: (Delete if not needed)    General Health Maintenance:     Vaccinations should be given at 1 and 2 years after your transplant, these may be given at your annual anniversary visits in the BMT clinic, by your primary care provider, or your local oncologist. An exception is the influenza vaccine, which can be given after day +60 post-transplant during influenza season. See table below for more information.     For general health concerns you can be seen by your primary care provider.     If you have questions about your transplant or follow up tests contact your BMT RN coordinator.    Vaccine Administration Schedule - Months Post HCT     12 months 14 months 24 months 26 months   29 months     All Patients   FlubokÆ/Fluzon  Seasonal administration is recommended annually for  all patients at Day +60 or later.  Delay of administration is at the discretion of the provider.   Pediarix  Pediarix  Pediarix  Pediarix      ActHIB  ActHIB  ActHIB  ActHIB      Pneumococcal Jabwllu22  Atevcft20  Kcbfxqy03  Pneumovax  or Zwhekiz87     Havrix  Havrix   (peds only)  Havrix     (peds only)     Menveo  MenveoÆ   (all patients up to 17 yoa, then see special populations) MenveoÆ   (all patients up to 17 yoa, then see special populations)      M-M-R II ( live)a   MMR II  MMR II     Varivax (live)a    Varivax  Varivax     Special Populations    Gardasil 9Æc Gardasil 9  Gardasil 9  Gardasil 9      MenveoÆb Menveo  Menveo       BexeroÆ Bexero  Bexero         ShingrixÆ Shingrix  Shingrix       ZostavaxÆ (live)a      Zostavax   May substitute with different brands that contain equivalent vaccine components.      All Patients   Flubok /Fluzone -Influenza quadrivalent (inactivated)    Seasonal administration is recommended annually for all patients at Day +60 or later.  Delay of administration is at the discretion of the provider.    Children age 6 months - 8 yrs may need 2 doses - Please follow CDC guidelines.    Live virus vaccine (nasal FluMist) is contraindicated in HCT patients Min Time Between Vaccines: 4 weeks   Pediarix   -Diphtheria, tetanus, & acellular pertussis (DTaP)  -Hepatitis B (HBV)  -Inactivated Polio (IPV)    Administer at 12 months, 14 months, and 24 months post HCT     Full dose diphtheria, tetanus, & pertussis is recommended (DTaP) for all patients   o Reduced doses of diphtheria toxoid and acellular pertussis in Tdap vaccines may be associated with a poor response in HCT patients  Min Time Between Vaccines:  4 weeks   ActHIB   -Haemophilus influenzae     type B conjugate (Hib)    Administer at 12 months, 14 months, and 24 months post HCT  Min Time Between Vaccines: 4 weeks   [id number]   -Pneumococcal polysaccharide conjugate vaccine (PCV13)    Administer at 12 months, 14 months, and 24  months post HCT    If chronic GVHD, Yvfeocy17  should be administered at 26 months or later post HCT (in place of Pneumovax )  Min Time Between Vaccines:  8 weeks   Pneumovax    -Pneumococcal polysaccharide (PPSV23)   Administer 26 months or later post HCT     If chronic GVHD, do not administer Pneumovax  (admin a 4th dose of Bjzoyxl55 )  Min Time Between Vaccines:  4 weeks   Havrix   -Hepatitis A (HepA)   Administer 12 months and 24 months post HCT for PEDIATRIC recipients  ONLY    Only administer to ADULT HCT recipients  who anticipate Hepatitis A exposure (e.g., during travel to endemic areas) and/or post-exposure prophylaxis Min Time Between Vaccines:    6 months   M-M-R II a  -Measles, mumps, rubella     Administer at 24 months and 26 months or later post HCT Min Time Between Vaccines:  4 weeks   Varivax a  -Varicella virus (MOISE)    If a recipient develops zoster post transplant, they should NOT receive the varicella vaccine    If a recipient received Shingrix , Varivax  should NOT be administered.    Can use the combination vaccine, ProQuad  (MMR + MOISE) Min Time Between Vaccines:  4 weeks     Special Populations   Gardasil 9   -Nonavalent human papillomavirus (HPV9)   Only for HCT recipients 11-26 years of age (male and female)   o Fanconi Anemia and Dyskeratosis Congenita patients: administer at 9-26 years of age (male and female)     Administer at 12 months, 14 months, and 24 months post HCT  Min Time Between Vaccines:   4 weeks between doses 1 and 2; 12 weeks between doses 2 and 3   Menveo   -Meningococcal vaccine tetravalent protein-conjugated (MCV4); MenACWY-D(Menactra )      If HCT recipient is 18 years of age or older, only administer if:   o Live in a dorm setting,  recruits  o Functionally or anatomically asplenic (including patients with chronic GVHD)  o Complement deficiency and/or human immunodeficiency and/or plan to travel in hyperendemic or endemic areas  Administer 2 doses ( by  8 weeks) 12 months or later post HCT    Bexero   -Meninococcal vaccine    Only for HCT recipients who are:   o > 2 years of age with functionally or anatomically asplenic, human immunodeficiency, and/or complement deficiency  o 10-25 years of age   o >25 years of age and live in a dorm setting,  recruits, and/or plan to travel in hyperendemic or endemic areas     Administer 2 doses ( by 8 weeks) 12 months or later post HCT  Min Time Between Vaccines:  8 weeks   Shingrix a   -Varicella-zoster virus    Only for HCT recipients who are:   o 10 years of age and older  o IF LESS THAN 50 YEARS OF AGE:  CHECK INSURANCE APPROVAL prior to administration     Administer 2 doses ( by 8 weeks) 12 months or later post HCT    Zostavax a         -Varicella-zoster virus    In patient received Shingrix    Only for HCT recipients who are > 60 years of age and meet the following criteria:   o > 5 years after transplant AND have been off immunosuppression for > 1 year      Administer at 29 months or later post HCT 1 Dose Only   Vaccination Information for Family Members/Close Contacts   General Guidelines    Individuals should receive vaccinations according to ACIP recommendations     Fluzone    -Influenza quadrivalent (inactivated)   Annual administration of inactivated flu vaccine is strongly encouraged     Live attenuated influenza vaccine (FluMist) should be avoided     If FluMist is given, this person should avoid HCT recipients for 7 days    Varivax a  -Varicella virus (MOISE)                 AND  Zostavax a          -Varicella-zoster virus   If post-vaccination rash develops within 42 days of varicella or zoster vaccination, utilize contact precautions until all rash lesions are crusted over  or rash resolved     Direct chicken pox exposure in a HCT recipient with no history of chicken pox or no prior chicken pox vaccination should have VZV serology testing and receive acyclovir until seropositivity is  confirmed negative    Havrix   -Hepatitis A (HepA)   Routine hepatitis A vaccination is recommended in endemic areas or during outbreaks (according to existing ACIP guidelines)    RotaTeq /Rotarix   -Rotavirus (live)    Avoid rotavirus vaccination in household contacts if possible     Highly immunocompromised patients should avoid handling diapers of vaccinated infants/children for the first 4 weeks following vaccination    Ipol (Polio)    -Inactivated polio vaccine (IPV)   Inactivated polio should be used when indicated per ACIP guidelines     Avoid oral polio vaccine as it can be transmitted person-to-person        Survivorship Care Plan:     This individualized care plan is designed to inform you and your healthcare team of the recommended follow-up visits, tests, health maintenance activities, and cancer screening you should receive after transplant.     Immune System:  Risks Preventative Measures Recommendations   Infections   Symptoms of a cold such as fever, cough, congestion, and shortness of breath should be reported to your primary care provider    Immunizations will be administered at 1 & 2 years after transplant. See table above. Vaccines at anniversary visit next week     Eyes:   Risks Preventative Measures Recommendations   Cataracts, dry eyes, viral infections, and other eye changes   Yearly eye exam     Screening and treatment for high blood pressure or diabetes    Call if you have eye pain, visual changes, or floaters immediately Patient will schedule an annual routine exam with community ophthalmologist     Mouth:  Risks Preventative Measures Recommendations   Dry mouth, cavities, and oral cancer   You can use over the counter Biotene for dry mouth or try sucking on sour candy before meals    Get a dental checkup every year and a cleaning every 6 months    If you have a prosthetic heart valve or central venous catheter or  port , you may need to take an antibiotic before your dental visits None at  this time, patient has dental provider and will schedule routine exam         Lungs  Risks Preventative Measures Recommendations   Changes in function from chemotherapy or radiation; lung infections (pneumonia)   Tell your provider about difficulty breathing, a cough, or new shortness of breath     Avoid use of tobacco products or smoking    Routine lung exams None at this time       Heart and Blood Vessels  Heart Disease Risk Score: The ASCVD Risk score (Alexa MCCORD Jr., et al., 2013) failed to calculate for the following reasons:    Cannot find a previous HDL lab    Cannot find a previous total cholesterol lab  Risks Preventative Measures Recommendations   Damage from chemotherapy and/or radiation; early development of heart valve disease    Ask your provider if you should receive consultation from a cardiologist (heart doctor) or have special screening    Follow a  heart healthy  lifestyle. For more information visit the National Heart, Lung, and Blood [employer] website at: https://www.nhlbi.nih.gov/health/health-topics/topics/heart-healthy-lifestyle-changes     Don t smoke. If you currently smoke and are ready to quit, we can help you find ways to quit    Maintain a healthy weight    Exercise regularly    Avoid foods that have high amounts of:  o Salt/sodium (less than 2,300 mg of sodium per day)  o Saturated and trans fats  o Limit alcohol to less than 1 drink for women and 2 drinks for men per day  o Sugar such as soft drinks or sugary snacks Fasting lipid panel to be performed by primary care provider     Hormones  Risks Preventative Measures Recommendations   Low thyroid function, low function of other glands (adrenals and others)   Certain endocrine/hormone disorders are more common after transplant. For this reason, talk to your provider about:  o Fatigue  o Muscle weakness  o Changes in cold tolerance  o Reduced interest in sex  o Erectile dysfunction     Certain tests may be used to monitor for hormone  changes if you are experiencing symptoms. These tests are done at 1 year TSH with T4 reflex (Recommended 1 & 2 years post-transplant), Testosterone/FSH/LH (Men, Ordered based on symptoms), Fasting Glucose (Recommended 6 mos & annually post-transplant) and Hgb A1C (Recommended annually post-transplant)       Liver:  Risks Preventative Measures Recommendations    Damage from chemotherapy or other drugs, buildup of iron from blood transfusions, and infections   Certain tests may be ordered at your next survivorship visit to evaluate liver function based on your individual risk factors.    Talk to your provider before taking herbal supplements or over the counter drugs like Tylenol.    Ask your doctor if you should be treated for iron overload    Avoid alcohol in excess   None at all       Kidneys and Bladder:  Risks Preventative Measures Recommendations   Damage from chemotherapy or other drugs, infections, high blood pressure   Monitoring blood tests of kidney function during follow up visits    Treating high blood pressure and diabetes     Drink adequate amounts of water    Report symptoms of infection such as frequent urination, pain with urination, foul odor to urine, or blood in the urine    Talk to your provider before taking herbal supplements or over the counter drugs like Ibuprofen None at this time       Nervous System:  Risks Preventative Measures Recommendations   Neuropathy from chemotherapy, changes in cognitive function   Report changes in sensation or discomfort in feet or hands    Tell your provider about ongoing changes in memory, ability to concentrate, or inability to make decisions   None at this time       Muscle & Connective Tissue  Risks Preventative Measures Recommendations   Reduced muscle strength & stamina   Let your provider know if:  o You notice changes in your muscle strength  o Require extra assistance with daily activities  o Need help creating an exercise routine  o Notice reduced  range of motion of the arms, hips, or legs    Follow general guidelines for physical activity as recommended by the Office of Disease Prevention & Health Promotion:    Avoid Inactivity  Some physical activity is better than none -- any amount has benefits.    Do Aerobic Activity  Do aerobic physical activity in episodes of at least 10 minutes, as many times as possible per day. This could include going for walks or using the elliptical or stationary bike.  Ask your doctor what aerobic activities would be safe and helpful for you, and set a goal for yourself!    Strengthen Muscles  Do muscle-strengthening activities (such as lifting light weights or using resistance bands and/or going up and down stairs) that are moderate or high intensity and involve all major muscle groups at least 4 days a week. None at this time     Emotional Health  Risks Preventative Measures Recommendations   Stress, depression, anxiety   Talk to your provider about:  o Changes in feelings, mood, or emotional wellbeing  o Interest in support groups  o If you are concerned about your caregivers emotional wellbeing  o Desire to speak with a counselor for ongoing support None at this time       Sexual Health  Risks Preventative Measures Recommendations   Reduced libido due to hormonal changes, erectile dysfunction, vaginal dryness, and sexually transmitted diseases  Talk to your provider about:    Reduced libido or concerns regarding your sexual health    Men: Erectile dysfunction     Women: Vaginal dryness, pain during intercourse, vaginal bleeding after intercourse, changes in vaginal discharge that may indicate infection (green/white/foul odor)   General Recommendations:    It is safe to have sex if your platelet count is > 50,000 and you feel physically and emotionally ready     Women can use water-based lubricants to reduce discomfort from dryness. Prescription topical estrogen may help as well.    Use barrier protection such as condoms to  prevent sexually transmitted diseases, you are at higher risk for infections due to a weakened immune system    For more information check out the National Marrow Donor Program web page on this topic:  https://bethematch.org/patients-and-families/life-after-transplant/coping-with-life-after-transplant/relationships-and-sexual-health/  FSH/LH/Testosterone (Men experiencing ED or reduced libido)       Fertility (delete if N/A)  Risks Preventative Measures Recommendations   Difficulty with sexual intercourse; difficulty having a baby   Women should avoid becoming pregnant for 2 years    Birth control should be used to prevent pregnancy    If you are interested in having a baby, discuss this with your provider None at this time       Cancer Screening:  Risks Preventative Measures Recommendations   Higher risk for the development of solid tumors, PTLD, and blood cancers   Preform a full self skin exam monthly to assess for any changes in moles or signs of skin cancer    Minimize excessive sun exposure and wear sunscreen    Women should preform breast-self exams and report any changes such as a new lump, discharge from nipples, and red or dimpled areas of the breast.     Imaging for breast cancer known as mammography is recommended for women starting at age 40 or 8 years after radiation exposure     Screening for colorectal cancer should begin at age 50    All women should have a pap smear every 1-3 years beginning at age 21    Men should perform a monthly testicular self-exam if they have been exposed to radiation as part of their cancer treatment   Dermatology Referral (Annual skin exam or evaluation of lesion)       Recommended Tests & Follow-Up:  Referrals & Tests Ordered Today:  Your BMT physician will review these tests and referral results. If you require treatment based on the results, a member of the BMT team will contact you.  Orders placed today:  TSH, Testoserone, Hgb A1C  (Note to providers: After signing  orders use the refresh tool in the note window to display results)   Future Tests/Referrals:   All recommendations above should be completed within 2 months either by your primary care provider or local oncologist (cancer doctor).   Recommendations that were not ordered today should be completed by your:  Primary Care Provider for fasting lipids & fasting glucose   Follow Up Care:  Continue to see your care team members routinely after transplant.  BMT Provider: Dr. Lucio today  Hematologist/Oncologist:  Primary Care Provider:      Karla Manzo    I spent 30 minutes with the patient and family, over half of which was spent discussing preventative care strategies, self-management practices, and potential complications after transplant.      Information used for these recommendations was obtained from: CONCEPCIÓN Reyes., ARABELLA Turner., SUMMER Steel., KATIA Montez., BELKIS Pérez., Natalie, JCaryl L.,   Lorie, K. M. (2013). Prevalence of Hematopoietic Cell Transplant Survivors in the [place]. Biology of Blood and Marrow Transplantation?: Journal of the American Society for Blood and Marrow Transplantation, 19(10), 4231-7846. doi 10.1016/j.bbmt.2013.07.020

## 2020-05-21 ENCOUNTER — VIRTUAL VISIT (OUTPATIENT)
Dept: TRANSPLANT | Facility: CLINIC | Age: 60
End: 2020-05-21
Attending: NURSE PRACTITIONER
Payer: COMMERCIAL

## 2020-05-21 ENCOUNTER — APPOINTMENT (OUTPATIENT)
Dept: LAB | Facility: CLINIC | Age: 60
End: 2020-05-21
Attending: INTERNAL MEDICINE
Payer: COMMERCIAL

## 2020-05-21 ENCOUNTER — INFUSION THERAPY VISIT (OUTPATIENT)
Dept: TRANSPLANT | Facility: CLINIC | Age: 60
End: 2020-05-21
Attending: INTERNAL MEDICINE
Payer: COMMERCIAL

## 2020-05-21 VITALS
HEART RATE: 74 BPM | WEIGHT: 177.3 LBS | RESPIRATION RATE: 16 BRPM | SYSTOLIC BLOOD PRESSURE: 129 MMHG | HEIGHT: 70 IN | BODY MASS INDEX: 25.38 KG/M2 | OXYGEN SATURATION: 99 % | TEMPERATURE: 98.7 F | DIASTOLIC BLOOD PRESSURE: 79 MMHG

## 2020-05-21 DIAGNOSIS — C90.00 MULTIPLE MYELOMA NOT HAVING ACHIEVED REMISSION (H): Primary | ICD-10-CM

## 2020-05-21 DIAGNOSIS — D69.6 THROMBOCYTOPENIA (H): Primary | ICD-10-CM

## 2020-05-21 DIAGNOSIS — Z94.81 STATUS POST BONE MARROW TRANSPLANT (H): ICD-10-CM

## 2020-05-21 DIAGNOSIS — C90.00 MULTIPLE MYELOMA NOT HAVING ACHIEVED REMISSION (H): ICD-10-CM

## 2020-05-21 DIAGNOSIS — D69.6 THROMBOCYTOPENIA (H): ICD-10-CM

## 2020-05-21 LAB
COPATH REPORT: NORMAL
FSH SERPL-ACNC: 17.6 IU/L (ref 0.7–10.8)
HBA1C MFR BLD: 4.8 % (ref 0–5.6)
LH SERPL-ACNC: 8 IU/L (ref 1.5–9.3)
TSH SERPL DL<=0.005 MIU/L-ACNC: 0.48 MU/L (ref 0.4–4)

## 2020-05-21 PROCEDURE — 84403 ASSAY OF TOTAL TESTOSTERONE: CPT | Mod: GT | Performed by: NURSE PRACTITIONER

## 2020-05-21 PROCEDURE — 83001 ASSAY OF GONADOTROPIN (FSH): CPT | Mod: GT | Performed by: NURSE PRACTITIONER

## 2020-05-21 PROCEDURE — 83002 ASSAY OF GONADOTROPIN (LH): CPT | Mod: GT | Performed by: NURSE PRACTITIONER

## 2020-05-21 PROCEDURE — 83036 HEMOGLOBIN GLYCOSYLATED A1C: CPT | Mod: GT | Performed by: NURSE PRACTITIONER

## 2020-05-21 PROCEDURE — 96372 THER/PROPH/DIAG INJ SC/IM: CPT

## 2020-05-21 PROCEDURE — 36415 COLL VENOUS BLD VENIPUNCTURE: CPT

## 2020-05-21 PROCEDURE — 25000128 H RX IP 250 OP 636: Mod: ZF | Performed by: INTERNAL MEDICINE

## 2020-05-21 PROCEDURE — 84443 ASSAY THYROID STIM HORMONE: CPT | Mod: GT | Performed by: NURSE PRACTITIONER

## 2020-05-21 PROCEDURE — G0463 HOSPITAL OUTPT CLINIC VISIT: HCPCS | Mod: 25

## 2020-05-21 RX ADMIN — ROMIPLOSTIM 750 MCG: 500 INJECTION, POWDER, LYOPHILIZED, FOR SOLUTION SUBCUTANEOUS at 14:54

## 2020-05-21 ASSESSMENT — PAIN SCALES - GENERAL: PAINLEVEL: NO PAIN (0)

## 2020-05-21 ASSESSMENT — MIFFLIN-ST. JEOR: SCORE: 1615.48

## 2020-05-21 NOTE — PROGRESS NOTES
"/79   Pulse 74   Temp 98.7  F (37.1  C) (Oral)   Resp 16   Ht 1.778 m (5' 10\")   Wt 80.4 kg (177 lb 4.8 oz)   SpO2 99%   BMI 25.44 kg/m    Wt Readings from Last 4 Encounters:   05/21/20 80.4 kg (177 lb 4.8 oz)   05/19/20 80 kg (176 lb 4.8 oz)   05/15/20 80.3 kg (177 lb)   05/08/20 80.6 kg (177 lb 9.6 oz)     This is a 1 year post transplant visit for Guille Yanez following his autotransplant for multiple myeloma.  He has had very prolonged pancytopenia with transfusion dependence until 3 to 4 months ago.  He had no response to eltrombopag or steroids but began romplostim on March 20 with gradually increasing doses.  He has had slow improvement of his blood counts since that time and needed fewer transfusions.    In the last few months he has had no fever chills or external bleeding.  He has easy bruising but quite minor.  He has no specific areas of bone pain or tenderness and his appetite and energy are acceptable.  He has no respiratory cardiac or specific ENT symptoms..    His exam shows acceptable vital signs and his weights stable over the last several months.  He has no peripheral edema or skin rash and no notable petechiae in his lower extremities or oropharynx.  He has no focal bone tenderness at any site.  His mouth showed no oral lesions and his lungs are clear.  His heart tones are regular there is no gallop rhythm.  His abdomen is soft and nontender without palpable masses or hepatosplenomegaly.    Laboratory testing showed a very hypocellular marrow with no evidence of myeloma.   Notably in March, cytogenetics were normal male and NGS testing showed no mutation suggestive of MDS or leukemia.  FISH was normal at that point as well.    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  An x-ray bone survey shows no change from 1 year earlier and a PET CT scan shows no hypermetabolic lesions except the right " anterior third rib.  Its not tender and he has no recollection of trauma to that area but later in the visit he said maybe he did have a sore spot there a few months ago.  It does not look to be a myeloma lesion but rather a subacute rib fracture with hypermetabolic signs of healing.    Overall his myeloma is in remission and though his marrow remains very hypocellular, there is no apparent evidence of formal myeloid injury by morphology or by the NGS and cytogenetic studies done in late March.      He seems to have had a response to romplostim and we will continue that at weekly intervals.  If his platelet count continues to rise we may spread the intervals to less than weekly dosing but will make that decision over the next month or  two.    Overall is good to see how well he is doing and it is hopeful that he finally has improvement in his blood counts.  We will defer a bit the initiation of his 1 year immunizations; as a higher platelet count may make the multiple injections a bit simpler.    He will continue weekly visits here for count checks and romplostim but he knows to call if additional problems arise    Adebayo Lucio MD   Professor of medicine    [facility] 90%    Results for SAMEER KNIGHT (MRN [id number]) as of 5/21/2020 16:01   Ref. Range 5/15/2020 11:50 5/19/2020 09:25 5/19/2020 11:07 5/19/2020 13:04 5/19/2020 13:23 5/19/2020 14:02 5/19/2020 14:04 5/21/2020 12:58   Sodium Latest Ref Range: 133 - 144 mmol/L 138    141      Potassium Latest Ref Range: 3.4 - 5.3 mmol/L 4.6    3.9      Chloride Latest Ref Range: 94 - 109 mmol/L 107    109      Carbon Dioxide Latest Ref Range: 20 - 32 mmol/L 25    24      Urea Nitrogen Latest Ref Range: 7 - 30 mg/dL 18    21      Creatinine Latest Ref Range: 0.66 - 1.25 mg/dL 0.86    0.91      GFR Estimate Latest Ref Range: >60 mL/min/1.73_m2 >90    >90      GFR Estimate If Black Latest Ref Range: >60 mL/min/1.73_m2 >90    >90      Calcium Latest Ref Range: 8.5 - 10.1 mg/dL 9.6     9.4      Anion Gap Latest Ref Range: 3 - 14 mmol/L 5    8      Magnesium Latest Ref Range: 1.6 - 2.3 mg/dL     2.2      Phosphorus Latest Ref Range: 2.5 - 4.5 mg/dL     3.3      Albumin Latest Ref Range: 3.4 - 5.0 g/dL     4.0      Protein Total Latest Ref Range: 6.8 - 8.8 g/dL     7.0      Bilirubin Total Latest Ref Range: 0.2 - 1.3 mg/dL     0.3      Alkaline Phosphatase Latest Ref Range: 40 - 150 U/L     71      ALT Latest Ref Range: 0 - 70 U/L     21      AST Latest Ref Range: 0 - 45 U/L     17      Hemoglobin A1C Latest Ref Range: 0 - 5.6 %        4.8   FSH Latest Ref Range: 0.7 - 10.8 IU/L        17.6 (H)   Lactate Dehydrogenase Latest Ref Range: 85 - 227 U/L     182      TSH Latest Ref Range: 0.40 - 4.00 mU/L        0.48   Uric Acid Latest Ref Range: 3.5 - 7.2 mg/dL     5.6      Glucose Latest Ref Range: 70 - 99 mg/dL 93    87      WBC Latest Ref Range: 4.0 - 11.0 10e9/L 4.2    3.4 (L)      Hemoglobin Latest Ref Range: 13.3 - 17.7 g/dL 10.9 (L)    10.7 (L)      Hematocrit Latest Ref Range: 40.0 - 53.0 % 32.5 (L)    32.0 (L)      Platelet Count Latest Ref Range: 150 - 450 10e9/L 36 (LL)    49 (LL)      RBC Count Latest Ref Range: 4.4 - 5.9 10e12/L 2.85 (L)    2.83 (L)      MCV Latest Ref Range: 78 - 100 fl 114 (H)    113 (H)      MCH Latest Ref Range: 26.5 - 33.0 pg 38.2 (H)    37.8 (H)      MCHC Latest Ref Range: 31.5 - 36.5 g/dL 33.5    33.4      RDW Latest Ref Range: 10.0 - 15.0 % 13.7    13.4      Diff Method Unknown Automated Method    Automated Method      % Neutrophils Latest Units: % 51.8    52.8      % Lymphocytes Latest Units: % 28.6    23.9      % Monocytes Latest Units: % 14.7    16.0      % Eosinophils Latest Units: % 4.0    6.1      % Basophils Latest Units: % 0.7    0.9      % Immature Granulocytes Latest Units: % 0.2    0.3      Nucleated RBCs Latest Ref Range: 0 /100 0    0      Absolute Neutrophil Latest Ref Range: 1.6 - 8.3 10e9/L 2.2    1.8      Absolute Lymphocytes Latest Ref Range: 0.8  - 5.3 10e9/L 1.2    0.8      Absolute Monocytes Latest Ref Range: 0.0 - 1.3 10e9/L 0.6    0.6      Absolute Eosinophils Latest Ref Range: 0.0 - 0.7 10e9/L 0.2    0.2      Absolute Basophils Latest Ref Range: 0.0 - 0.2 10e9/L 0.0    0.0      Abs Immature Granulocytes Latest Ref Range: 0 - 0.4 10e9/L 0.0    0.0      Absolute Nucleated RBC Unknown 0.0    0.0      Platelet Estimate Unknown Confirming automated cell count    Confirming automated cell count      ABO Unknown O          RH(D) Unknown Pos          Antibody Screen Unknown Neg          Test Valid Only At Latest Units:     [facility]          Specimen Expires Unknown 05/18/2020          Albumin Fraction Latest Ref Range: 3.7 - 5.1 g/dL     4.4      Alpha 1 Fraction Latest Ref Range: 0.2 - 0.4 g/dL     0.3      Alpha 2 Fraction Latest Ref Range: 0.5 - 0.9 g/dL     0.7      Beta Fraction Latest Ref Range: 0.6 - 1.0 g/dL     0.6      CHROMOSOME BONE MARROW Unknown       Rpt    ELP Interpretation: Unknown     Essentially lynsey...      FISH Unknown       Rpt    Gamma Fraction Latest Ref Range: 0.7 - 1.6 g/dL     0.6 (L)      IGA Latest Ref Range: 84 - 499 mg/dL     26 (L)      IGG Latest Ref Range: 610 - 1,616 mg/dL     719      IGM Latest Ref Range: 35 - 242 mg/dL     59      Immunofixation ELP Unknown     No monoclonal pro...      Kappa Free Lt Chain Latest Ref Range: 0.33 - 1.94 mg/dL     0.79      Kappa Lambda Ratio Latest Ref Range: 0.26 - 1.65      1.14      Lambda Free Lt Chain Latest Ref Range: 0.57 - 2.63 mg/dL     0.69      LEUKEMIA LYMPHOMA EVALUATION (FLOW CYTOMETRY) Unknown    Rpt       Lutropin Latest Ref Range: 1.5 - 9.3 IU/L        8.0   Monoclonal Peak Latest Ref Range: 0.0 g/dL     0.0      PROCESS AND HOLD DNA Unknown       Rpt    BONE MARROW BIOPSY Unknown      Rpt     XR BONE SURVEY COMPLETE Unknown  Rpt         PET ONCOLOGY WHOLE BODY Unknown   Rpt        Patient Name: SHIMON KNIGHT   MR#:  [id number]   Specimen #: [id number]   Collected: 5/19/2020   Received: 5/19/2020   Reported: 5/20/2020 13:49   Ordering Phy(s): OSCAR CORBETT   Additional Phy(s): Copy to Cytogenetics     For improved result formatting, select 'View Enhanced Report Format' under    Linked Documents section.     TEST(S):   Unilateral Bone Marrow Biopsy/Aspiration     FINAL DIAGNOSIS:   Bone marrow, posterior iliac crest, right decalcified trephine biopsy and   touch imprint; right direct aspirate   smear, and concentrated aspirate smear; and peripheral blood smear:     - Markedly hypocellular (cellularity estimated at  5% overall) with   markedly reduced trilineage   hematopoiesis, 2% blasts, no overt morphologic dysplasia (see comment)     - No morphologic or immunophenotypic evidence for plasma cell myeloma,   less than 1% polytypic plasma cells     - Peripheral blood showing moderate normochromic, macrocytic anemia;   slight leukopenia; marked   thrombocytopenia     COMMENT:   The evaluation is limited because aspirate smears are not representative   for the bone marrow. The peripheral   blood cytopenias are likely due to reduced hematopoiesis in the bone   marrow, which shows reduced hematopoiesis   approaching marrow aplasia. The exact etiology of this finding is   uncertain from this examination, potential   causes include  toxic, drug-related,  immune-mediated, nutritional   deficiency, infectious and other. At this   time and in this limited specimen no overt morphologic dysplasia is   appreciated, Correlation with results of   ancillary studies (cytogenetic, molecular) and follow-up is recommended.   Peripheral blood examination to rule-out PNH could be also considered, if   clinically indicated.   Concurrent flow cytometry ([id number]) showed rare polytypic plasma cells.     I have personally reviewed all specimens and/or slides, including the   listed special stains, and used them   with my medical judgment to  determine the final diagnosis.     Electronically signed out by:     Patt Rodriguez M.D.,[facility]     XR BONE SURVEY COMPLETE 5/19/2020 9:25 AM      HISTORY:  Multiple myeloma not having achieved remission (H)     COMPARISON: Previous bone survey 8/23/2019     FINDINGS: Again seen are the multiple compression fractures in the  thoracic and lumbar spine including T5, T8, T9, T10, T11, T12 and  lumbar vertebral bodies L1-L4 with subtle compression fracture  superior endplate of L5. The compression is most marked at the T8,  T10, T12 and lumbar vertebral bodies L1 and L3.     These are unchanged since previous exam. Some degenerative changes  seen in the facet joints and disc space narrowing at multiple levels.     Some degenerative changes seen in the cervical spine.     Total right hip again seen.     Severe degenerative changes left hip also unchanged.     The remainder of the bony skeleton is unremarkable. No lytic lesion  seen in the skull.                                                                      IMPRESSION: No change in multiple areas of degenerative change with  compression fractures thoracic and lumbar spine.     LUIS FERNANDO SHEPPARD MD    Combined Report of:    PET and CT on  5/19/2020 11:07 AM :     1. PET of the neck, chest, abdomen, and pelvis.  2. PET CT Fusion for Attenuation Correction and Anatomical  Localization:    3. 3D MIP and PET-CT fused images were processed on an independent  workstation and archived to PACS and reviewed by a radiologist.     Technique:     1. PET: The patient received 10.53 mCi of F-18-FDG; the serum glucose  was 88 prior to administration, body weight was 80.3 kg. Images were  evaluated in the axial, sagittal, and coronal planes as well as the  rotational whole body MIP. Images were acquired from the Vertex to the  Feet.     UPTAKE WAS MEASURED AT 68 MINUTES.      BACKGROUND:  Liver SUV max= 3.40,   Aorta Blood SUV Max: 2.46.      2. CT: CT only obtained for  attenuation correction and not diagnostic  purposes.     INDICATION: mm s/p AUto pbsct; Multiple myeloma not having achieved  remission (H)     ADDITIONAL INFORMATION OBTAINED FROM EMR: none     COMPARISON: 8/23/2019     FINDINGS:      HEAD/NECK:  There is no suspicious FDG uptake in the neck.      CHEST:  There is no suspicious FDG uptake in the chest.      ABDOMEN AND PELVIS:  Increased uptake along the pyloric canal of the stomach with SUV max  of 7.46, increased since prior PET (previously 4.9). No concerning  mass appearance on CT, could be due to gastritis.      LOWER EXTREMITIES:   No abnormal masses or hypermetabolic lesions.     BONES:   Single focus of increased uptake in the anterior aspect of the right  third rib, immediately lateral to the costochondral junction in  correspondence of a new, likely subacute fracture with SUV max of  4.95.  No other foci of increased uptake in the skeleton. Unchanged multiple  thoracic and lumbar vertebral collapse deformities and diffuse  osteopenia.                                                                      IMPRESSION: This patient with history of multiple myeloma status post  transplant,  1. New focus of hypermetabolism in the anterior aspect of the right  third rib in correspondence of the fracture.No associated soft tissue  or definite underlying lytic lesion.    2. Hypermetabolism along gastric pyloric canal, likely due to  gastritis. Please clinically correlate and if needed endoscopy can be  performed.     I have personally reviewed the examination and initial interpretation  and I agree with the findings.     JEFF SINGLETARY MD

## 2020-05-21 NOTE — NURSING NOTE
"Oncology Rooming Note    May 21, 2020 1:09 PM   Angel Yanez is a 59 year old male who presents for:    Chief Complaint   Patient presents with     Blood Draw     vitals and blood drawn by LPN. Pt checked into appt.      RECHECK     Return: Multiple myeloma     Initial Vitals: /79   Pulse 74   Temp 98.7  F (37.1  C) (Oral)   Resp 16   Ht 1.778 m (5' 10\")   Wt 80.4 kg (177 lb 4.8 oz)   SpO2 99%   BMI 25.44 kg/m   Estimated body mass index is 25.44 kg/m  as calculated from the following:    Height as of this encounter: 1.778 m (5' 10\").    Weight as of this encounter: 80.4 kg (177 lb 4.8 oz). Body surface area is 1.99 meters squared.  No Pain (0) Comment: Data Unavailable   No LMP for male patient.  Allergies reviewed: Yes  Medications reviewed: Yes    Medications: Medication refills not needed today.  Pharmacy name entered into EPIC:    [facility] - [facility] [place] - [address]  [facility] - [place], [place] - [address]    Clinical concerns: N/A     Yesica Duncan CMA              "

## 2020-05-21 NOTE — LETTER
5/21/2020         RE: Angel Yanez  [id number]  [place] [address]        Dear Colleague,    Thank you for referring your patient, Angel Yanez, to the [facility]. Please see a copy of my visit note below.    Infusion Nursing Note:  Angel Yanez presents today for nplate.    Patient seen by provider today: Yes: Dr. Lucio   present during visit today: Not Applicable.    Note: Patient arrives for N-plate injection, administered subcutaneous right abdomen and patient tolerated well.  No further replacement needs today.    Intravenous Access:  No Intravenous access/labs at this visit.    Treatment Conditions:  Not Applicable.      Post Infusion Assessment:  Patient tolerated injection without incident.       Discharge Plan:   Patient and/or family verbalized understanding of discharge instructions and all questions answered.  Patient discharged in stable condition accompanied by: self.  Departure Mode: Ambulatory.    Isamar Tam RN            Again, thank you for allowing me to participate in the care of your patient.        Sincerely,        [facility]

## 2020-05-21 NOTE — LETTER
"    5/21/2020         RE: Angel Yanez  [address]  [facility] [address]      Chief Complaint   Patient presents with     Blood Draw     vitals and blood drawn by LPN. Pt checked into appt.      DEBBIE Hernandez LPN    /79   Pulse 74   Temp 98.7  F (37.1  C) (Oral)   Resp 16   Ht 1.778 m (5' 10\")   Wt 80.4 kg (177 lb 4.8 oz)   SpO2 99%   BMI 25.44 kg/m    Wt Readings from Last 4 Encounters:   05/21/20 80.4 kg (177 lb 4.8 oz)   05/19/20 80 kg (176 lb 4.8 oz)   05/15/20 80.3 kg (177 lb)   05/08/20 80.6 kg (177 lb 9.6 oz)     This is a 1 year post transplant visit for Guille Yanez following his autotransplant for multiple myeloma.  He has had very prolonged pancytopenia with transfusion dependence until 3 to 4 months ago.  He had no response to eltrombopag or steroids but began romplostim on March 20 with gradually increasing doses.  He has had slow improvement of his blood counts since that time and needed fewer transfusions.    In the last few months he has had no fever chills or external bleeding.  He has easy bruising but quite minor.  He has no specific areas of bone pain or tenderness and his appetite and energy are acceptable.  He has no respiratory cardiac or specific ENT symptoms..    His exam shows acceptable vital signs and his weights stable over the last several months.  He has no peripheral edema or skin rash and no notable petechiae in his lower extremities or oropharynx.  He has no focal bone tenderness at any site.  His mouth showed no oral lesions and his lungs are clear.  His heart tones are regular there is no gallop rhythm.  His abdomen is soft and nontender without palpable masses or hepatosplenomegaly.    Laboratory testing showed a very hypocellular marrow with no evidence of myeloma.   Notably in March, cytogenetics were normal male and NGS testing showed no mutation suggestive of MDS or leukemia.  FISH was normal at that point as well.    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  An x-ray bone survey shows no change from 1 year earlier and a PET CT scan shows no hypermetabolic lesions except the right anterior third rib.  Its not tender and he has no recollection of trauma to that area but later in the visit he said maybe he did have a sore spot there a few months ago.  It does not look to be a myeloma lesion but rather a subacute rib fracture with hypermetabolic signs of healing.    Overall his myeloma is in remission and though his marrow remains very hypocellular, there is no apparent evidence of formal myeloid injury by morphology or by the NGS and cytogenetic studies done in late March.      He seems to have had a response to romplostim and we will continue that at weekly intervals.  If his platelet count continues to rise we may spread the intervals to less than weekly dosing but will make that decision over the next month or  two.    Overall is good to see how well he is doing and it is hopeful that he finally has improvement in his blood counts.  We will defer a bit the initiation of his 1 year immunizations; as a higher platelet count may make the multiple injections a bit simpler.    He will continue weekly visits here for count checks and romplostim but he knows to call if additional problems arise    Adebayo Lucio MD   Professor of medicine    KPS 90%    Results for SAMEER KNIGHT (MRN [id number]) as of 5/21/2020 16:01   Ref. Range 5/15/2020 11:50 5/19/2020 09:25 5/19/2020 11:07 5/19/2020 13:04 5/19/2020 13:23 5/19/2020 14:02 5/19/2020 14:04 5/21/2020 12:58   Sodium Latest Ref Range: 133 - 144 mmol/L 138    141      Potassium Latest Ref Range: 3.4 - 5.3 mmol/L 4.6    3.9      Chloride Latest Ref Range: 94 - 109 mmol/L 107    109      Carbon Dioxide Latest Ref Range: 20 - 32 mmol/L 25    24      Urea Nitrogen Latest Ref Range: 7 - 30 mg/dL 18    21      Creatinine Latest Ref Range:  0.66 - 1.25 mg/dL 0.86    0.91      GFR Estimate Latest Ref Range: >60 mL/min/1.73_m2 >90    >90      GFR Estimate If Black Latest Ref Range: >60 mL/min/1.73_m2 >90    >90      Calcium Latest Ref Range: 8.5 - 10.1 mg/dL 9.6    9.4      Anion Gap Latest Ref Range: 3 - 14 mmol/L 5    8      Magnesium Latest Ref Range: 1.6 - 2.3 mg/dL     2.2      Phosphorus Latest Ref Range: 2.5 - 4.5 mg/dL     3.3      Albumin Latest Ref Range: 3.4 - 5.0 g/dL     4.0      Protein Total Latest Ref Range: 6.8 - 8.8 g/dL     7.0      Bilirubin Total Latest Ref Range: 0.2 - 1.3 mg/dL     0.3      Alkaline Phosphatase Latest Ref Range: 40 - 150 U/L     71      ALT Latest Ref Range: 0 - 70 U/L     21      AST Latest Ref Range: 0 - 45 U/L     17      Hemoglobin A1C Latest Ref Range: 0 - 5.6 %        4.8   FSH Latest Ref Range: 0.7 - 10.8 IU/L        17.6 (H)   Lactate Dehydrogenase Latest Ref Range: 85 - 227 U/L     182      TSH Latest Ref Range: 0.40 - 4.00 mU/L        0.48   Uric Acid Latest Ref Range: 3.5 - 7.2 mg/dL     5.6      Glucose Latest Ref Range: 70 - 99 mg/dL 93    87      WBC Latest Ref Range: 4.0 - 11.0 10e9/L 4.2    3.4 (L)      Hemoglobin Latest Ref Range: 13.3 - 17.7 g/dL 10.9 (L)    10.7 (L)      Hematocrit Latest Ref Range: 40.0 - 53.0 % 32.5 (L)    32.0 (L)      Platelet Count Latest Ref Range: 150 - 450 10e9/L 36 (LL)    49 (LL)      RBC Count Latest Ref Range: 4.4 - 5.9 10e12/L 2.85 (L)    2.83 (L)      MCV Latest Ref Range: 78 - 100 fl 114 (H)    113 (H)      MCH Latest Ref Range: 26.5 - 33.0 pg 38.2 (H)    37.8 (H)      MCHC Latest Ref Range: 31.5 - 36.5 g/dL 33.5    33.4      RDW Latest Ref Range: 10.0 - 15.0 % 13.7    13.4      Diff Method Unknown Automated Method    Automated Method      % Neutrophils Latest Units: % 51.8    52.8      % Lymphocytes Latest Units: % 28.6    23.9      % Monocytes Latest Units: % 14.7    16.0      % Eosinophils Latest Units: % 4.0    6.1      % Basophils Latest Units: % 0.7    0.9       % Immature Granulocytes Latest Units: % 0.2    0.3      Nucleated RBCs Latest Ref Range: 0 /100 0    0      Absolute Neutrophil Latest Ref Range: 1.6 - 8.3 10e9/L 2.2    1.8      Absolute Lymphocytes Latest Ref Range: 0.8 - 5.3 10e9/L 1.2    0.8      Absolute Monocytes Latest Ref Range: 0.0 - 1.3 10e9/L 0.6    0.6      Absolute Eosinophils Latest Ref Range: 0.0 - 0.7 10e9/L 0.2    0.2      Absolute Basophils Latest Ref Range: 0.0 - 0.2 10e9/L 0.0    0.0      Abs Immature Granulocytes Latest Ref Range: 0 - 0.4 10e9/L 0.0    0.0      Absolute Nucleated RBC Unknown 0.0    0.0      Platelet Estimate Unknown Confirming automated cell count    Confirming automated cell count      ABO Unknown O          RH(D) Unknown Pos          Antibody Screen Unknown Neg          Test Valid Only At Latest Units:     [facility],[employer]          Specimen Expires Unknown 05/18/2020          Albumin Fraction Latest Ref Range: 3.7 - 5.1 g/dL     4.4      Alpha 1 Fraction Latest Ref Range: 0.2 - 0.4 g/dL     0.3      Alpha 2 Fraction Latest Ref Range: 0.5 - 0.9 g/dL     0.7      Beta Fraction Latest Ref Range: 0.6 - 1.0 g/dL     0.6      CHROMOSOME BONE MARROW Unknown       Rpt    ELP Interpretation: Unknown     Essentially lynsey...      FISH Unknown       Rpt    Gamma Fraction Latest Ref Range: 0.7 - 1.6 g/dL     0.6 (L)      IGA Latest Ref Range: 84 - 499 mg/dL     26 (L)      IGG Latest Ref Range: 610 - 1,616 mg/dL     719      IGM Latest Ref Range: 35 - 242 mg/dL     59      Immunofixation ELP Unknown     No monoclonal pro...      Kappa Free Lt Chain Latest Ref Range: 0.33 - 1.94 mg/dL     0.79      Kappa Lambda Ratio Latest Ref Range: 0.26 - 1.65      1.14      Lambda Free Lt Chain Latest Ref Range: 0.57 - 2.63 mg/dL     0.69      LEUKEMIA LYMPHOMA EVALUATION (FLOW CYTOMETRY) Unknown    Rpt       Lutropin Latest Ref Range: 1.5 - 9.3 IU/L        8.0   Monoclonal Peak Latest Ref Range: 0.0 g/dL      0.0      PROCESS AND HOLD DNA Unknown       Rpt    BONE MARROW BIOPSY Unknown      Rpt     XR BONE SURVEY COMPLETE Unknown  Rpt         PET ONCOLOGY WHOLE BODY Unknown   Rpt        Patient Name: SHIMON KNIGHT   MR#: [id number]   Specimen #: [id number]   Collected: 5/19/2020   Received: 5/19/2020   Reported: 5/20/2020 13:49   Ordering Phy(s): OSCAR CORBETT   Additional Phy(s): Copy to Cytogenetics     For improved result formatting, select 'View Enhanced Report Format' under    Linked Documents section.     TEST(S):   Unilateral Bone Marrow Biopsy/Aspiration     FINAL DIAGNOSIS:   Bone marrow, posterior iliac crest, right decalcified trephine biopsy and   touch imprint; right direct aspirate   smear, and concentrated aspirate smear; and peripheral blood smear:     - Markedly hypocellular (cellularity estimated at  5% overall) with   markedly reduced trilineage   hematopoiesis, 2% blasts, no overt morphologic dysplasia (see comment)     - No morphologic or immunophenotypic evidence for plasma cell myeloma,   less than 1% polytypic plasma cells     - Peripheral blood showing moderate normochromic, macrocytic anemia;   slight leukopenia; marked   thrombocytopenia     COMMENT:   The evaluation is limited because aspirate smears are not representative   for the bone marrow. The peripheral   blood cytopenias are likely due to reduced hematopoiesis in the bone   marrow, which shows reduced hematopoiesis   approaching marrow aplasia. The exact etiology of this finding is   uncertain from this examination, potential   causes include  toxic, drug-related,  immune-mediated, nutritional   deficiency, infectious and other. At this   time and in this limited specimen no overt morphologic dysplasia is   appreciated, Correlation with results of   ancillary studies (cytogenetic, molecular) and follow-up is recommended.   Peripheral blood examination to rule-out PNH could be also considered, if   clinically indicated.    Concurrent flow cytometry ([id number]) showed rare polytypic plasma cells.     I have personally reviewed all specimens and/or slides, including the   listed special stains, and used them   with my medical judgment to determine the final diagnosis.     Electronically signed out by:     Patt Rodriguez M.D.,[facility]     XR BONE SURVEY COMPLETE 5/19/2020 9:25 AM      HISTORY:  Multiple myeloma not having achieved remission (H)     COMPARISON: Previous bone survey 8/23/2019     FINDINGS: Again seen are the multiple compression fractures in the  thoracic and lumbar spine including T5, T8, T9, T10, T11, T12 and  lumbar vertebral bodies L1-L4 with subtle compression fracture  superior endplate of L5. The compression is most marked at the T8,  T10, T12 and lumbar vertebral bodies L1 and L3.     These are unchanged since previous exam. Some degenerative changes  seen in the facet joints and disc space narrowing at multiple levels.     Some degenerative changes seen in the cervical spine.     Total right hip again seen.     Severe degenerative changes left hip also unchanged.     The remainder of the bony skeleton is unremarkable. No lytic lesion  seen in the skull.                                                                      IMPRESSION: No change in multiple areas of degenerative change with  compression fractures thoracic and lumbar spine.     LUIS FERNANDO SHEPPARD MD    Combined Report of:    PET and CT on  5/19/2020 11:07 AM :     1. PET of the neck, chest, abdomen, and pelvis.  2. PET CT Fusion for Attenuation Correction and Anatomical  Localization:    3. 3D MIP and PET-CT fused images were processed on an independent  workstation and archived to PACS and reviewed by a radiologist.     Technique:     1. PET: The patient received 10.53 mCi of F-18-FDG; the serum glucose  was 88 prior to administration, body weight was 80.3 kg. Images were  evaluated in the axial, sagittal, and coronal planes as well as  the  rotational whole body MIP. Images were acquired from the Vertex to the  Feet.     UPTAKE WAS MEASURED AT 68 MINUTES.      BACKGROUND:  Liver SUV max= 3.40,   Aorta Blood SUV Max: 2.46.      2. CT: CT only obtained for attenuation correction and not diagnostic  purposes.     INDICATION: mm s/p AUto pbsct; Multiple myeloma not having achieved  remission (H)     ADDITIONAL INFORMATION OBTAINED FROM EMR: none     COMPARISON: 8/23/2019     FINDINGS:      HEAD/NECK:  There is no suspicious FDG uptake in the neck.      CHEST:  There is no suspicious FDG uptake in the chest.      ABDOMEN AND PELVIS:  Increased uptake along the pyloric canal of the stomach with SUV max  of 7.46, increased since prior PET (previously 4.9). No concerning  mass appearance on CT, could be due to gastritis.      LOWER EXTREMITIES:   No abnormal masses or hypermetabolic lesions.     BONES:   Single focus of increased uptake in the anterior aspect of the right  third rib, immediately lateral to the costochondral junction in  correspondence of a new, likely subacute fracture with SUV max of  4.95.  No other foci of increased uptake in the skeleton. Unchanged multiple  thoracic and lumbar vertebral collapse deformities and diffuse  osteopenia.                                                                      IMPRESSION: This patient with history of multiple myeloma status post  transplant,  1. New focus of hypermetabolism in the anterior aspect of the right  third rib in correspondence of the fracture.No associated soft tissue  or definite underlying lytic lesion.    2. Hypermetabolism along gastric pyloric canal, likely due to  gastritis. Please clinically correlate and if needed endoscopy can be  performed.     I have personally reviewed the examination and initial interpretation  and I agree with the findings.     MD Adebayo DARBY MD

## 2020-05-21 NOTE — Clinical Note
"    5/21/2020         RE: Angel Yanez  [address]  [facility] [address]        Dear Colleague,    Thank you for referring your patient, Angel Yanez, to the [facility] BLOOD AND MARROW TRANSPLANT. Please see a copy of my visit note below.    Chief Complaint   Patient presents with     Blood Draw     vitals and blood drawn by LPN. Pt checked into appt.      DEBBIE Whitea LPN    /79   Pulse 74   Temp 98.7  F (37.1  C) (Oral)   Resp 16   Ht 1.778 m (5' 10\")   Wt 80.4 kg (177 lb 4.8 oz)   SpO2 99%   BMI 25.44 kg/m    Wt Readings from Last 4 Encounters:   05/21/20 80.4 kg (177 lb 4.8 oz)   05/19/20 80 kg (176 lb 4.8 oz)   05/15/20 80.3 kg (177 lb)   05/08/20 80.6 kg (177 lb 9.6 oz)     This is a 1 year post transplant visit for Guille Yanez who is following his autotransplant for multiple myeloma.  He has had very prolonged pancytopenia with transfusion dependence until 3 to 4 months ago.  He had no response to eltrombopag or steroids but began romplostim on March 20 with gradually increasing doses.  He has had slow improvement of his blood counts since that time and needed fewer transfusions.    In the last few months he has had no fever chills or external bleeding.  He has easy bruising but quite minor.  He has no specific areas of bone pain or tenderness and his appetite and energy are acceptable.  He has no respiratory cardiac or specific ENT symptoms..    His exam shows acceptable vital signs and his weights stable over the last several months.  He has no peripheral edema or skin rash and no notable petechiae in his lower extremities or oropharynx.  He has no focal bone tenderness at any site.  His mouth showed no oral lesions and his lungs are clear.  His heart tones are regular there is no gallop rhythm.  His abdomen is soft and nontender without palpable masses or hepatosplenomegaly.    Laboratory testing showed a very hypocellular marrow with no evidence of myeloma.  Cytogenetic and " molecular testing is pending.  Notably in March, cytogenetics were normal male and NGS testing showed no mutation suggestive of MDS or leukemia.  FISH had been normal at that point as well.    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  An x-ray bone survey shows no change from 1 year earlier and up PET CT scan shows no hypermetabolic lesions except the right anterior third rib.  Its not tender and he has no recollection of trauma to that area but later in the visit he said maybe he did have a sore spot there a few months ago.  It does not look to be a myeloma lesion but rather a subacute rib fracture with hypermetabolic signs of healing.    Overall his myeloma is in remission and though his marrow remains very hypocellular, it is no apparent evidence of formal myeloid injury.  He seems to have had a response to rump Dionicio and will continue that at weekly intervals.  If his platelet count continues to rise we may spread the intervals to less than weekly dosing but will make that decision over the next month or 2.    Overall is good to see how well he is doing and it is hopeful that he finally has improvement in his blood counts    He will continue weekly visits here for count checks and romplostim but he knows to call if additional problems arise    Adebayo Lucio MD   Professor of medicine    Results for ANTONIASAMEER (MRN [id number]) as of 5/21/2020 16:01   Ref. Range 5/15/2020 11:50 5/19/2020 09:25 5/19/2020 11:07 5/19/2020 13:04 5/19/2020 13:23 5/19/2020 14:02 5/19/2020 14:04 5/21/2020 12:58   Sodium Latest Ref Range: 133 - 144 mmol/L 138    141      Potassium Latest Ref Range: 3.4 - 5.3 mmol/L 4.6    3.9      Chloride Latest Ref Range: 94 - 109 mmol/L 107    109      Carbon Dioxide Latest Ref Range: 20 - 32 mmol/L 25    24      Urea Nitrogen Latest Ref Range: 7 - 30 mg/dL 18    21      Creatinine Latest Ref Range: 0.66 -  1.25 mg/dL 0.86    0.91      GFR Estimate Latest Ref Range: >60 mL/min/1.73_m2 >90    >90      GFR Estimate If Black Latest Ref Range: >60 mL/min/1.73_m2 >90    >90      Calcium Latest Ref Range: 8.5 - 10.1 mg/dL 9.6    9.4      Anion Gap Latest Ref Range: 3 - 14 mmol/L 5    8      Magnesium Latest Ref Range: 1.6 - 2.3 mg/dL     2.2      Phosphorus Latest Ref Range: 2.5 - 4.5 mg/dL     3.3      Albumin Latest Ref Range: 3.4 - 5.0 g/dL     4.0      Protein Total Latest Ref Range: 6.8 - 8.8 g/dL     7.0      Bilirubin Total Latest Ref Range: 0.2 - 1.3 mg/dL     0.3      Alkaline Phosphatase Latest Ref Range: 40 - 150 U/L     71      ALT Latest Ref Range: 0 - 70 U/L     21      AST Latest Ref Range: 0 - 45 U/L     17      Hemoglobin A1C Latest Ref Range: 0 - 5.6 %        4.8   FSH Latest Ref Range: 0.7 - 10.8 IU/L        17.6 (H)   Lactate Dehydrogenase Latest Ref Range: 85 - 227 U/L     182      TSH Latest Ref Range: 0.40 - 4.00 mU/L        0.48   Uric Acid Latest Ref Range: 3.5 - 7.2 mg/dL     5.6      Glucose Latest Ref Range: 70 - 99 mg/dL 93    87      WBC Latest Ref Range: 4.0 - 11.0 10e9/L 4.2    3.4 (L)      Hemoglobin Latest Ref Range: 13.3 - 17.7 g/dL 10.9 (L)    10.7 (L)      Hematocrit Latest Ref Range: 40.0 - 53.0 % 32.5 (L)    32.0 (L)      Platelet Count Latest Ref Range: 150 - 450 10e9/L 36 (LL)    49 (LL)      RBC Count Latest Ref Range: 4.4 - 5.9 10e12/L 2.85 (L)    2.83 (L)      MCV Latest Ref Range: 78 - 100 fl 114 (H)    113 (H)      MCH Latest Ref Range: 26.5 - 33.0 pg 38.2 (H)    37.8 (H)      MCHC Latest Ref Range: 31.5 - 36.5 g/dL 33.5    33.4      RDW Latest Ref Range: 10.0 - 15.0 % 13.7    13.4      Diff Method Unknown Automated Method    Automated Method      % Neutrophils Latest Units: % 51.8    52.8      % Lymphocytes Latest Units: % 28.6    23.9      % Monocytes Latest Units: % 14.7    16.0      % Eosinophils Latest Units: % 4.0    6.1      % Basophils Latest Units: % 0.7    0.9      %  Immature Granulocytes Latest Units: % 0.2    0.3      Nucleated RBCs Latest Ref Range: 0 /100 0    0      Absolute Neutrophil Latest Ref Range: 1.6 - 8.3 10e9/L 2.2    1.8      Absolute Lymphocytes Latest Ref Range: 0.8 - 5.3 10e9/L 1.2    0.8      Absolute Monocytes Latest Ref Range: 0.0 - 1.3 10e9/L 0.6    0.6      Absolute Eosinophils Latest Ref Range: 0.0 - 0.7 10e9/L 0.2    0.2      Absolute Basophils Latest Ref Range: 0.0 - 0.2 10e9/L 0.0    0.0      Abs Immature Granulocytes Latest Ref Range: 0 - 0.4 10e9/L 0.0    0.0      Absolute Nucleated RBC Unknown 0.0    0.0      Platelet Estimate Unknown Confirming automated cell count    Confirming automated cell count      ABO Unknown O          RH(D) Unknown Pos          Antibody Screen Unknown Neg          Test Valid Only At Latest Units:     [facility]          Specimen Expires Unknown 05/18/2020          Albumin Fraction Latest Ref Range: 3.7 - 5.1 g/dL     4.4      Alpha 1 Fraction Latest Ref Range: 0.2 - 0.4 g/dL     0.3      Alpha 2 Fraction Latest Ref Range: 0.5 - 0.9 g/dL     0.7      Beta Fraction Latest Ref Range: 0.6 - 1.0 g/dL     0.6      CHROMOSOME BONE MARROW Unknown       Rpt    ELP Interpretation: Unknown     Essentially lynsey...      FISH Unknown       Rpt    Gamma Fraction Latest Ref Range: 0.7 - 1.6 g/dL     0.6 (L)      IGA Latest Ref Range: 84 - 499 mg/dL     26 (L)      IGG Latest Ref Range: 610 - 1,616 mg/dL     719      IGM Latest Ref Range: 35 - 242 mg/dL     59      Immunofixation ELP Unknown     No monoclonal pro...      Kappa Free Lt Chain Latest Ref Range: 0.33 - 1.94 mg/dL     0.79      Kappa Lambda Ratio Latest Ref Range: 0.26 - 1.65      1.14      Lambda Free Lt Chain Latest Ref Range: 0.57 - 2.63 mg/dL     0.69      LEUKEMIA LYMPHOMA EVALUATION (FLOW CYTOMETRY) Unknown    Rpt       Lutropin Latest Ref Range: 1.5 - 9.3 IU/L        8.0   Monoclonal Peak Latest Ref Range: 0.0 g/dL     0.0       PROCESS AND HOLD DNA Unknown       Rpt    BONE MARROW BIOPSY Unknown      Rpt     XR BONE SURVEY COMPLETE Unknown  Rpt         PET ONCOLOGY WHOLE BODY Unknown   Rpt        Patient Name: SHIMON KNIGHT   MR#: [id number]   Specimen #: [id number]   Collected: 5/19/2020   Received: 5/19/2020   Reported: 5/20/2020 13:49   Ordering Phy(s): OSCAR CORBETT   Additional Phy(s): Copy to Cytogenetics     For improved result formatting, select 'View Enhanced Report Format' under    Linked Documents section.     TEST(S):   Unilateral Bone Marrow Biopsy/Aspiration     FINAL DIAGNOSIS:   Bone marrow, posterior iliac crest, right decalcified trephine biopsy and   touch imprint; right direct aspirate   smear, and concentrated aspirate smear; and peripheral blood smear:     - Markedly hypocellular (cellularity estimated at  5% overall) with   markedly reduced trilineage   hematopoiesis, 2% blasts, no overt morphologic dysplasia (see comment)     - No morphologic or immunophenotypic evidence for plasma cell myeloma,   less than 1% polytypic plasma cells     - Peripheral blood showing moderate normochromic, macrocytic anemia;   slight leukopenia; marked   thrombocytopenia     COMMENT:   The evaluation is limited because aspirate smears are not representative   for the bone marrow. The peripheral   blood cytopenias are likely due to reduced hematopoiesis in the bone   marrow, which shows reduced hematopoiesis   approaching marrow aplasia. The exact etiology of this finding is   uncertain from this examination, potential   causes include  toxic, drug-related,  immune-mediated, nutritional   deficiency, infectious and other. At this   time and in this limited specimen no overt morphologic dysplasia is   appreciated, Correlation with results of   ancillary studies (cytogenetic, molecular) and follow-up is recommended.   Peripheral blood examination to rule-out PNH could be also considered, if   clinically indicated.    Concurrent flow cytometry ([id number]) showed rare polytypic plasma cells.     I have personally reviewed all specimens and/or slides, including the   listed special stains, and used them   with my medical judgment to determine the final diagnosis.     Electronically signed out by:     Patt Rodriguez M.D.,[facility]     XR BONE SURVEY COMPLETE 5/19/2020 9:25 AM      HISTORY:  Multiple myeloma not having achieved remission (H)     COMPARISON: Previous bone survey 8/23/2019     FINDINGS: Again seen are the multiple compression fractures in the  thoracic and lumbar spine including T5, T8, T9, T10, T11, T12 and  lumbar vertebral bodies L1-L4 with subtle compression fracture  superior endplate of L5. The compression is most marked at the T8,  T10, T12 and lumbar vertebral bodies L1 and L3.     These are unchanged since previous exam. Some degenerative changes  seen in the facet joints and disc space narrowing at multiple levels.     Some degenerative changes seen in the cervical spine.     Total right hip again seen.     Severe degenerative changes left hip also unchanged.     The remainder of the bony skeleton is unremarkable. No lytic lesion  seen in the skull.                                                                      IMPRESSION: No change in multiple areas of degenerative change with  compression fractures thoracic and lumbar spine.     LUIS FERNANDO SHEPPARD MD    Combined Report of:    PET and CT on  5/19/2020 11:07 AM :     1. PET of the neck, chest, abdomen, and pelvis.  2. PET CT Fusion for Attenuation Correction and Anatomical  Localization:    3. 3D MIP and PET-CT fused images were processed on an independent  workstation and archived to PACS and reviewed by a radiologist.     Technique:     1. PET: The patient received 10.53 mCi of F-18-FDG; the serum glucose  was 88 prior to administration, body weight was 80.3 kg. Images were  evaluated in the axial, sagittal, and coronal planes as well as  the  rotational whole body MIP. Images were acquired from the Vertex to the  Feet.     UPTAKE WAS MEASURED AT 68 MINUTES.      BACKGROUND:  Liver SUV max= 3.40,   Aorta Blood SUV Max: 2.46.      2. CT: CT only obtained for attenuation correction and not diagnostic  purposes.     INDICATION: mm s/p AUto pbsct; Multiple myeloma not having achieved  remission (H)     ADDITIONAL INFORMATION OBTAINED FROM EMR: none     COMPARISON: 8/23/2019     FINDINGS:      HEAD/NECK:  There is no suspicious FDG uptake in the neck.      CHEST:  There is no suspicious FDG uptake in the chest.      ABDOMEN AND PELVIS:  Increased uptake along the pyloric canal of the stomach with SUV max  of 7.46, increased since prior PET (previously 4.9). No concerning  mass appearance on CT, could be due to gastritis.      LOWER EXTREMITIES:   No abnormal masses or hypermetabolic lesions.     BONES:   Single focus of increased uptake in the anterior aspect of the right  third rib, immediately lateral to the costochondral junction in  correspondence of a new, likely subacute fracture with SUV max of  4.95.  No other foci of increased uptake in the skeleton. Unchanged multiple  thoracic and lumbar vertebral collapse deformities and diffuse  osteopenia.                                                                      IMPRESSION: This patient with history of multiple myeloma status post  transplant,  1. New focus of hypermetabolism in the anterior aspect of the right  third rib in correspondence of the fracture.No associated soft tissue  or definite underlying lytic lesion.    2. Hypermetabolism along gastric pyloric canal, likely due to  gastritis. Please clinically correlate and if needed endoscopy can be  performed.     I have personally reviewed the examination and initial interpretation  and I agree with the findings.     JEFF SINGLETARY MD    Again, thank you for allowing me to participate in the care of your patient.        Sincerely,        Adebayo  Vlad Lucio MD

## 2020-05-21 NOTE — PROGRESS NOTES
Chief Complaint   Patient presents with     Blood Draw     vitals and blood drawn by LPN. Pt checked into olimpiat.      DEBBIE Hernandez LPN

## 2020-05-21 NOTE — PROGRESS NOTES
Infusion Nursing Note:  Angel Yanez presents today for nplate.    Patient seen by provider today: Yes: Dr. Lucio   present during visit today: Not Applicable.    Note: Patient arrives for N-plate injection, administered subcutaneous right abdomen and patient tolerated well.  No further replacement needs today.    Intravenous Access:  No Intravenous access/labs at this visit.    Treatment Conditions:  Not Applicable.      Post Infusion Assessment:  Patient tolerated injection without incident.       Discharge Plan:   Patient and/or family verbalized understanding of discharge instructions and all questions answered.  Patient discharged in stable condition accompanied by: self.  Departure Mode: Ambulatory.    Isamar Tam RN

## 2020-05-22 LAB
DEPRECATED CALCIDIOL+CALCIFEROL SERPL-MC: <65 UG/L (ref 20–75)
TESTOST SERPL-MCNC: 321 NG/DL (ref 240–950)
VITAMIN D2 SERPL-MCNC: <5 UG/L
VITAMIN D3 SERPL-MCNC: 60 UG/L

## 2020-05-28 ENCOUNTER — APPOINTMENT (OUTPATIENT)
Dept: LAB | Facility: CLINIC | Age: 60
End: 2020-05-28
Attending: INTERNAL MEDICINE
Payer: COMMERCIAL

## 2020-05-28 ENCOUNTER — INFUSION THERAPY VISIT (OUTPATIENT)
Dept: TRANSPLANT | Facility: CLINIC | Age: 60
End: 2020-05-28
Attending: INTERNAL MEDICINE
Payer: COMMERCIAL

## 2020-05-28 VITALS
BODY MASS INDEX: 25.58 KG/M2 | TEMPERATURE: 98.7 F | SYSTOLIC BLOOD PRESSURE: 123 MMHG | HEART RATE: 87 BPM | RESPIRATION RATE: 16 BRPM | DIASTOLIC BLOOD PRESSURE: 71 MMHG | OXYGEN SATURATION: 99 % | WEIGHT: 178.3 LBS

## 2020-05-28 DIAGNOSIS — D69.6 THROMBOCYTOPENIA (H): Primary | ICD-10-CM

## 2020-05-28 DIAGNOSIS — Z94.81 STATUS POST BONE MARROW TRANSPLANT (H): ICD-10-CM

## 2020-05-28 LAB
ALBUMIN SERPL-MCNC: 3.9 G/DL (ref 3.4–5)
ALP SERPL-CCNC: 78 U/L (ref 40–150)
ALT SERPL W P-5'-P-CCNC: 22 U/L (ref 0–70)
ANION GAP SERPL CALCULATED.3IONS-SCNC: 6 MMOL/L (ref 3–14)
AST SERPL W P-5'-P-CCNC: 19 U/L (ref 0–45)
BASOPHILS # BLD AUTO: 0 10E9/L (ref 0–0.2)
BASOPHILS NFR BLD AUTO: 0.8 %
BILIRUB SERPL-MCNC: 0.3 MG/DL (ref 0.2–1.3)
BUN SERPL-MCNC: 19 MG/DL (ref 7–30)
CALCIUM SERPL-MCNC: 9.1 MG/DL (ref 8.5–10.1)
CHLORIDE SERPL-SCNC: 105 MMOL/L (ref 94–109)
CO2 SERPL-SCNC: 26 MMOL/L (ref 20–32)
CREAT SERPL-MCNC: 0.92 MG/DL (ref 0.66–1.25)
DIFFERENTIAL METHOD BLD: ABNORMAL
EOSINOPHIL # BLD AUTO: 0.2 10E9/L (ref 0–0.7)
EOSINOPHIL NFR BLD AUTO: 4.4 %
ERYTHROCYTE [DISTWIDTH] IN BLOOD BY AUTOMATED COUNT: 12.8 % (ref 10–15)
GFR SERPL CREATININE-BSD FRML MDRD: 89 ML/MIN/{1.73_M2}
GLUCOSE SERPL-MCNC: 100 MG/DL (ref 70–99)
HCT VFR BLD AUTO: 31.4 % (ref 40–53)
HGB BLD-MCNC: 10.5 G/DL (ref 13.3–17.7)
IMM GRANULOCYTES # BLD: 0 10E9/L (ref 0–0.4)
IMM GRANULOCYTES NFR BLD: 0.3 %
LYMPHOCYTES # BLD AUTO: 0.8 10E9/L (ref 0.8–5.3)
LYMPHOCYTES NFR BLD AUTO: 20.6 %
MCH RBC QN AUTO: 37.6 PG (ref 26.5–33)
MCHC RBC AUTO-ENTMCNC: 33.4 G/DL (ref 31.5–36.5)
MCV RBC AUTO: 113 FL (ref 78–100)
MONOCYTES # BLD AUTO: 0.5 10E9/L (ref 0–1.3)
MONOCYTES NFR BLD AUTO: 13.7 %
NEUTROPHILS # BLD AUTO: 2.3 10E9/L (ref 1.6–8.3)
NEUTROPHILS NFR BLD AUTO: 60.2 %
NRBC # BLD AUTO: 0 10*3/UL
NRBC BLD AUTO-RTO: 0 /100
PLATELET # BLD AUTO: 68 10E9/L (ref 150–450)
POTASSIUM SERPL-SCNC: 3.7 MMOL/L (ref 3.4–5.3)
PROT SERPL-MCNC: 6.9 G/DL (ref 6.8–8.8)
RBC # BLD AUTO: 2.79 10E12/L (ref 4.4–5.9)
SODIUM SERPL-SCNC: 138 MMOL/L (ref 133–144)
WBC # BLD AUTO: 3.9 10E9/L (ref 4–11)

## 2020-05-28 PROCEDURE — 96372 THER/PROPH/DIAG INJ SC/IM: CPT

## 2020-05-28 PROCEDURE — G0463 HOSPITAL OUTPT CLINIC VISIT: HCPCS | Mod: 25

## 2020-05-28 PROCEDURE — 36415 COLL VENOUS BLD VENIPUNCTURE: CPT

## 2020-05-28 PROCEDURE — 85025 COMPLETE CBC W/AUTO DIFF WBC: CPT | Performed by: STUDENT IN AN ORGANIZED HEALTH CARE EDUCATION/TRAINING PROGRAM

## 2020-05-28 PROCEDURE — 25000128 H RX IP 250 OP 636: Mod: ZF | Performed by: PHYSICIAN ASSISTANT

## 2020-05-28 PROCEDURE — 80053 COMPREHEN METABOLIC PANEL: CPT | Performed by: STUDENT IN AN ORGANIZED HEALTH CARE EDUCATION/TRAINING PROGRAM

## 2020-05-28 RX ADMIN — ROMIPLOSTIM 750 MCG: 250 INJECTION, POWDER, LYOPHILIZED, FOR SOLUTION SUBCUTANEOUS at 14:14

## 2020-05-28 ASSESSMENT — PAIN SCALES - GENERAL: PAINLEVEL: NO PAIN (0)

## 2020-05-28 NOTE — NURSING NOTE
"Oncology Rooming Note    May 28, 2020 1:20 PM   Angel Yanez is a 59 year old male who presents for:    Chief Complaint   Patient presents with     RECHECK     Multiple Myeloma      Initial Vitals: There were no vitals taken for this visit. Estimated body mass index is 25.58 kg/m  as calculated from the following:    Height as of 5/21/20: 1.778 m (5' 10\").    Weight as of an earlier encounter on 5/28/20: 80.9 kg (178 lb 4.8 oz). There is no height or weight on file to calculate BSA.  Data Unavailable Comment: Data Unavailable   No LMP for male patient.  Allergies reviewed: Yes  Medications reviewed: Yes    Medications: Medication refills not needed today.  Pharmacy name entered into EPIC:    [facility] - [place], [place] - [address] [facility] - [place], [place] - [address]    Clinical concerns: None       Kathy Perez CMA              "

## 2020-05-28 NOTE — LETTER
5/28/2020         RE: Angel Yanez  [address]  [place] [address]        Dear Colleague,    Thank you for referring your patient, Angel Yanez, to the [facility] BLOOD AND MARROW TRANSPLANT. Please see a copy of my visit note below.    Chief Complaint   Patient presents with     Blood Draw     Vitals and blood drawn by LPN. Pt checked into appt.     JIC tubes sent to lab, page sent to Mony Pitts.     DEBBIE Hernandez LPN    Infusion Nursing Note:  Angel Yanez presents today for N-Plate subcutaneous Injection. See MAR.     Patient seen by provider today: Yes: SHANNON Delgado   present during visit today: Not Applicable.    Note: Pt received NPlate subcutaneous Injection in Right Lower Abd and tolerated it well. Pt with a Plt count of 68K.      Intravenous Access:  NA    Treatment Conditions:  Lab Results   Component Value Date    HGB 10.5 05/28/2020     Lab Results   Component Value Date    WBC 3.9 05/28/2020      Lab Results   Component Value Date    ANEU 2.3 05/28/2020     Lab Results   Component Value Date    PLT 68 05/28/2020          Post Infusion Assessment:  Patient tolerated injection without incident.       Discharge Plan:   Patient discharged in stable condition accompanied by: self.  Departure Mode: Ambulatory.    Radha Coker RN                          Again, thank you for allowing me to participate in the care of your patient.        Sincerely,        [facility]

## 2020-05-28 NOTE — LETTER
5/28/2020       RE: Angel Yanez  [address]  [place] [facility] [address]        Dear Colleague,    Thank you for referring your patient, Angel Yanez, to the [facility] BLOOD AND MARROW TRANSPLANT. Please see a copy of my visit note below.    BMT Clinic Progress Note    Patient ID: Angel Yanez is a 58 yo man 1 year s/p 2nd autologous transplant for MM.     Interval history:     Guille returns for nplate and follow up. Biking to regain strength. No bleeding, no fevers or any new complaints.     ROS: 8 point ROS neg unless specified above.       Physical Exam:   There were no vitals taken for this visit.    Wt Readings from Last 4 Encounters:   05/28/20 80.9 kg (178 lb 4.8 oz)   05/21/20 80.4 kg (177 lb 4.8 oz)   05/19/20 80 kg (176 lb 4.8 oz)   05/15/20 80.3 kg (177 lb)     General: NAD, engaged  ENT: oral mucosa moist without ulceration, No OP erythema or petechiae.  Lungs: CTAB  Cardiovascular: RRR, S1, S2, no m/r/g  GI/Abdomen: +BS, soft, nontender, non-distended  Skin: No rashes or petechaie.    MSK/Extremities: Warm, well perfused.  Neurologic: alert, and conversant  Access: peripheral    Data:     Lab Results   Component Value Date    WBC 3.9 (L) 05/28/2020    ANEU 2.3 05/28/2020    HGB 10.5 (L) 05/28/2020    HCT 31.4 (L) 05/28/2020    PLT 68 (L) 05/28/2020     05/28/2020    POTASSIUM 3.7 05/28/2020    CHLORIDE 105 05/28/2020    CO2 26 05/28/2020     (H) 05/28/2020    BUN 19 05/28/2020    CR 0.92 05/28/2020    MAG 2.2 05/19/2020    INR 1.03 04/30/2020    BILITOTAL 0.3 05/28/2020    AST 19 05/28/2020    ALT 22 05/28/2020    ALKPHOS 78 05/28/2020    PROTTOTAL 6.9 05/28/2020    ALBUMIN 3.9 05/28/2020       Assessment and Plan:     Angel Yanez is a 58 yo man 1 year s/p 2nd autologous transplant for MM with ongoing cytopenias.     1.  BMT/MM:   Slow engraftment; cell dose was only 0.639x10^6. (Marrow [place] - known poor cell dose as failed chemo-mobilization 1/2019.)   - day +180 bone  marrow biopsy 11/6/19 which showed no morphologic or immunophenotypic evidence of plasma cell neoplasm. His light chains were not elevated and he had no monoclonal protein in the serum.  He is in clinical remission.  - PET in 8/2019 clear.   - 1 year bmbx See Naveed's complete note: no monoclonal protein (his disease was originally IgA kappa) and other chemistry studies showed no signs of residual disease. An x-ray bone survey shows no change from 1 year earlier and a PET CT scan shows no hypermetabolic lesions except the right anterior third rib... It does not look to be a myeloma lesion but rather a subacute rib fracture with hypermetabolic signs of healing. Myeloma in remission, however marrow is hypocellular.    2.  HEME: Keep Hgb>8g/dL, plt >10k.   - Persistant profound thrombocytopenia. Experiencing some improvement on Nplate (started 3/19). Now on max dose 10mcg/kg. Continue with weekly doses as plt responding well  Plt up to 68! Pt defers immunizations today but safe to give   - Previous trial of promacta not helpful. Stopped 12/6/19. Trial of prednisone not helpful, Pred taper compelte on 4/16.      3.  ID: afebrile   - FUOs: Extensive ID work up- no etiology identified. Now afebrile     Prophy: ACV, Fluconazole & pentamidine (3/26). Per Dr. Lucio OK to discontinue PCP proph  - influenza vaccination given 11/26/19     4. GI:  no complaints.      5. CV: Hx of steroid induced HTN, remains hypertensive now.  Cont Lisinopril 10mg daily. Note of htn today.  If continues to be elevated, may need additional treatment.     6.  FEN/Renal: Cr and lytes WNL     7.  Mood: Continue Paxil.     8. FUOs-  Some kind of auto immune process DDX:  Sarcoidosis? Underwent EBUS FNA which was negative for granulomas however unlikely you could see this on just fluid aspirate so would not consider       RTC 1 week for next dose nplate, vaccines  Mony Pitts PAC  [phone]

## 2020-05-28 NOTE — PROGRESS NOTES
Infusion Nursing Note:  Angel SUMMER Renata presents today for N-Plate subcutaneous Injection. See MAR.     Patient seen by provider today: Yes: SHANNON Delgado   present during visit today: Not Applicable.    Note: Pt received NPlate subcutaneous Injection in Right Lower Abd and tolerated it well. Pt with a Plt count of 68K.      Intravenous Access:  NA    Treatment Conditions:  Lab Results   Component Value Date    HGB 10.5 05/28/2020     Lab Results   Component Value Date    WBC 3.9 05/28/2020      Lab Results   Component Value Date    ANEU 2.3 05/28/2020     Lab Results   Component Value Date    PLT 68 05/28/2020          Post Infusion Assessment:  Patient tolerated injection without incident.       Discharge Plan:   Patient discharged in stable condition accompanied by: self.  Departure Mode: Ambulatory.    Radha Coker RN

## 2020-05-28 NOTE — PROGRESS NOTES
BMT Clinic Progress Note    Patient ID: Angel Yanez is a 58 yo man 1 year s/p 2nd autologous transplant for MM.     Interval history:     Guille returns for nplate and follow up. Biking to regain strength. No bleeding, no fevers or any new complaints.     ROS: 8 point ROS neg unless specified above.       Physical Exam:   There were no vitals taken for this visit.    Wt Readings from Last 4 Encounters:   05/28/20 80.9 kg (178 lb 4.8 oz)   05/21/20 80.4 kg (177 lb 4.8 oz)   05/19/20 80 kg (176 lb 4.8 oz)   05/15/20 80.3 kg (177 lb)     General: NAD, engaged  ENT: oral mucosa moist without ulceration, No OP erythema or petechiae.  Lungs: CTAB  Cardiovascular: RRR, S1, S2, no m/r/g  GI/Abdomen: +BS, soft, nontender, non-distended  Skin: No rashes or petechaie.    MSK/Extremities: Warm, well perfused.  Neurologic: alert, and conversant  Access: peripheral    Data:     Lab Results   Component Value Date    WBC 3.9 (L) 05/28/2020    ANEU 2.3 05/28/2020    HGB 10.5 (L) 05/28/2020    HCT 31.4 (L) 05/28/2020    PLT 68 (L) 05/28/2020     05/28/2020    POTASSIUM 3.7 05/28/2020    CHLORIDE 105 05/28/2020    CO2 26 05/28/2020     (H) 05/28/2020    BUN 19 05/28/2020    CR 0.92 05/28/2020    MAG 2.2 05/19/2020    INR 1.03 04/30/2020    BILITOTAL 0.3 05/28/2020    AST 19 05/28/2020    ALT 22 05/28/2020    ALKPHOS 78 05/28/2020    PROTTOTAL 6.9 05/28/2020    ALBUMIN 3.9 05/28/2020       Assessment and Plan:     Angel Yanez is a 58 yo man 1 year s/p 2nd autologous transplant for MM with ongoing cytopenias.     1.  BMT/MM:   Slow engraftment; cell dose was only 0.639x10^6. (Marrow [place] - known poor cell dose as failed chemo-mobilization 1/2019.)   - day +180 bone marrow biopsy 11/6/19 which showed no morphologic or immunophenotypic evidence of plasma cell neoplasm. His light chains were not elevated and he had no monoclonal protein in the serum.  He is in clinical remission.  - PET in 8/2019 clear.   - 1 year  bmbx See Naveed's complete note: no monoclonal protein (his disease was originally IgA kappa) and other chemistry studies showed no signs of residual disease. An x-ray bone survey shows no change from 1 year earlier and a PET CT scan shows no hypermetabolic lesions except the right anterior third rib... It does not look to be a myeloma lesion but rather a subacute rib fracture with hypermetabolic signs of healing. Myeloma in remission, however marrow is hypocellular.    2.  HEME: Keep Hgb>8g/dL, plt >10k.   - Persistant profound thrombocytopenia. Experiencing some improvement on Nplate (started 3/19). Now on max dose 10mcg/kg. Continue with weekly doses as plt responding well  Plt up to 68! Pt defers immunizations today but safe to give   - Previous trial of promacta not helpful. Stopped 12/6/19. Trial of prednisone not helpful, Pred taper compelte on 4/16.      3.  ID: afebrile   - FUOs: Extensive ID work up- no etiology identified. Now afebrile     Prophy: ACV, Fluconazole & pentamidine (3/26). Per Dr. Lucio OK to discontinue PCP proph  - influenza vaccination given 11/26/19     4. GI:  no complaints.      5. CV: Hx of steroid induced HTN, remains hypertensive now.  Cont Lisinopril 10mg daily. Note of htn today.  If continues to be elevated, may need additional treatment.     6.  FEN/Renal: Cr and lytes WNL     7.  Mood: Continue Paxil.     8. FUOs-  Some kind of auto immune process DDX:  Sarcoidosis? Underwent EBUS FNA which was negative for granulomas however unlikely you could see this on just fluid aspirate so would not consider       RTC 1 week for next dose nplate, vaccines  Mony Pitts PAC  [phone]

## 2020-05-28 NOTE — PROGRESS NOTES
Chief Complaint   Patient presents with     Blood Draw     Vitals and blood drawn by LPN. Pt checked into appt.     JIC tubes sent to lab, page sent to Mony Pitts.     DEBBIE Hernandez LPN

## 2020-05-28 NOTE — NURSING NOTE
Chief Complaint   Patient presents with     Blood Draw     Vitals and blood drawn by LPN. Pt checked into appt.     RECHECK     Pt here for N-Plate SQ Injection. Pt is s/p BMT for Multiple Myeloma.      Radha Coker RN

## 2020-06-01 DIAGNOSIS — C90.00 MULTIPLE MYELOMA NOT HAVING ACHIEVED REMISSION (H): ICD-10-CM

## 2020-06-01 DIAGNOSIS — Z94.81 STATUS POST BONE MARROW TRANSPLANT (H): ICD-10-CM

## 2020-06-01 DIAGNOSIS — Z86.2 PERSONAL HISTORY OF DISEASES OF BLOOD AND BLOOD-FORMING ORGANS: ICD-10-CM

## 2020-06-01 RX ORDER — PAROXETINE 20 MG/1
20 TABLET, FILM COATED ORAL EVERY MORNING
Qty: 90 TABLET | Refills: 4 | Status: SHIPPED | OUTPATIENT
Start: 2020-06-01 | End: 2021-08-30

## 2020-06-02 LAB
COPATH REPORT: NORMAL
COPATH REPORT: NORMAL

## 2020-06-03 NOTE — PROGRESS NOTES
BMT Clinic Progress Note    Patient ID: Angel Yanez is a 58 yo man 1 year s/p 2nd autologous transplant for MM.     Interval history:     Guille returns for nplate and follow up. He has no complaints - happy plts continue to trend up. Wondering at what point we stop nplate. He denies any infectious concerns. Planning on going to [place] to see his kids next week. Agreeable to vaccines today.     ROS: 8 point ROS neg unless specified above.       Physical Exam:   Blood pressure 120/68, pulse 74, temperature 97.5  F (36.4  C), temperature source Tympanic, resp. rate 16, weight 80.3 kg (177 lb), SpO2 98 %.    Wt Readings from Last 4 Encounters:   05/28/20 80.9 kg (178 lb 4.8 oz)   05/21/20 80.4 kg (177 lb 4.8 oz)   05/19/20 80 kg (176 lb 4.8 oz)   05/15/20 80.3 kg (177 lb)     General: NAD, engaged  ENT: oral mucosa moist without ulceration, No OP erythema or petechiae.  Lungs: CTAB  Cardiovascular: RRR, S1, S2, no m/r/g  GI/Abdomen: +BS, soft, nontender, non-distended  Skin: No rashes or petechaie.    MSK/Extremities: Warm, well perfused.  Neurologic: alert, and conversant  Access: peripheral    Data:     Lab Results   Component Value Date    WBC 3.9 (L) 05/28/2020    ANEU 2.3 05/28/2020    HGB 10.5 (L) 05/28/2020    HCT 31.4 (L) 05/28/2020    PLT 68 (L) 05/28/2020     05/28/2020    POTASSIUM 3.7 05/28/2020    CHLORIDE 105 05/28/2020    CO2 26 05/28/2020     (H) 05/28/2020    BUN 19 05/28/2020    CR 0.92 05/28/2020    MAG 2.2 05/19/2020    INR 1.03 04/30/2020    BILITOTAL 0.3 05/28/2020    AST 19 05/28/2020    ALT 22 05/28/2020    ALKPHOS 78 05/28/2020    PROTTOTAL 6.9 05/28/2020    ALBUMIN 3.9 05/28/2020       Assessment and Plan:     Angel Yanez is a 58 yo man 1 year s/p 2nd autologous transplant for MM with ongoing cytopenias.     1.  BMT/MM:   Slow engraftment; cell dose was only 0.639x10^6. (Marrow [place] - known poor cell dose as failed chemo-mobilization 1/2019.)   - day +180 bone marrow  biopsy 11/6/19 which showed no morphologic or immunophenotypic evidence of plasma cell neoplasm. His light chains were not elevated and he had no monoclonal protein in the serum.  He is in clinical remission.  - PET in 8/2019 clear.   - 1 year bmbx See Naveed's complete note: no monoclonal protein (his disease was originally IgA kappa) and other chemistry studies showed no signs of residual disease. An x-ray bone survey shows no change from 1 year earlier and a PET CT scan shows no hypermetabolic lesions except the right anterior third rib... It does not look to be a myeloma lesion but rather a subacute rib fracture with hypermetabolic signs of healing. Myeloma in remission, however marrow is hypocellular.    2.  HEME: Keep Hgb>8g/dL, plt >10k.   - Persistant profound thrombocytopenia. Experiencing some improvement on Nplate (started 3/19). Now on max dose 10mcg/kg. plts up to 80k  -Per Dr Lucio once plts 100k then decrease nplate to every other week and see if plts can be maintained on less frequent dosing.   - Previous trial of promacta not helpful. Stopped 12/6/19. Trial of prednisone not helpful, Pred taper complete on 4/16.      3.  ID: afebrile   - FUOs: Extensive ID work up- no etiology identified. Now afebrile   -Given 1 year vaccine today 6/4/20    Prophy: ACV, Fluconazole & pentamidine (3/26). Per Dr. Lucio OK to discontinue PCP proph  - influenza vaccination given 11/26/19     4. GI:  no complaints.      5. CV: Hx of steroid induced HTN, remains hypertensive now.  C  -Had been on Lisinopril 10mg daily. He was not hypertensive before taking steroids throughout the year. Discussed BP seems well controlled 110-120/70-60 - he does have some mild dizziness on standing- okay to trail lisinopril 5mg daily- monitor BP.      6.  FEN/Renal: Cr and lytes WNL     7.  Mood: Continue Paxil.     8. FUOs-  Some kind of auto immune process DDX:  Sarcoidosis? Underwent EBUS FNA which was negative for granulomas  however unlikely you could see this on just fluid aspirate so would not consider       RTC 1 week for next dose nplate, labs and provider visit.   - when plts 100k decrease to QOW appts/nplate    Myrna Marrufo PA-C  [phone]

## 2020-06-04 ENCOUNTER — APPOINTMENT (OUTPATIENT)
Dept: LAB | Facility: CLINIC | Age: 60
End: 2020-06-04
Attending: INTERNAL MEDICINE
Payer: COMMERCIAL

## 2020-06-04 ENCOUNTER — ONCOLOGY VISIT (OUTPATIENT)
Dept: TRANSPLANT | Facility: CLINIC | Age: 60
End: 2020-06-04
Attending: INTERNAL MEDICINE
Payer: COMMERCIAL

## 2020-06-04 VITALS
SYSTOLIC BLOOD PRESSURE: 120 MMHG | OXYGEN SATURATION: 98 % | RESPIRATION RATE: 16 BRPM | DIASTOLIC BLOOD PRESSURE: 68 MMHG | WEIGHT: 177 LBS | BODY MASS INDEX: 25.4 KG/M2 | TEMPERATURE: 97.5 F | HEART RATE: 74 BPM

## 2020-06-04 DIAGNOSIS — Z94.81 STATUS POST BONE MARROW TRANSPLANT (H): Primary | ICD-10-CM

## 2020-06-04 DIAGNOSIS — D69.6 THROMBOCYTOPENIA (H): ICD-10-CM

## 2020-06-04 DIAGNOSIS — C90.00 MULTIPLE MYELOMA NOT HAVING ACHIEVED REMISSION (H): ICD-10-CM

## 2020-06-04 PROCEDURE — 90472 IMMUNIZATION ADMIN EACH ADD: CPT

## 2020-06-04 PROCEDURE — 25000581 ZZH RX MED A9270 GY (STAT IND- M) 250: Mod: ZF | Performed by: INTERNAL MEDICINE

## 2020-06-04 PROCEDURE — 90670 PCV13 VACCINE IM: CPT | Mod: ZF | Performed by: INTERNAL MEDICINE

## 2020-06-04 PROCEDURE — G0010 ADMIN HEPATITIS B VACCINE: HCPCS

## 2020-06-04 PROCEDURE — 25000128 H RX IP 250 OP 636: Mod: ZF | Performed by: PHYSICIAN ASSISTANT

## 2020-06-04 PROCEDURE — G0009 ADMIN PNEUMOCOCCAL VACCINE: HCPCS

## 2020-06-04 PROCEDURE — 96372 THER/PROPH/DIAG INJ SC/IM: CPT

## 2020-06-04 PROCEDURE — 90750 HZV VACC RECOMBINANT IM: CPT | Mod: ZF | Performed by: INTERNAL MEDICINE

## 2020-06-04 PROCEDURE — 90648 HIB PRP-T VACCINE 4 DOSE IM: CPT | Mod: ZF | Performed by: INTERNAL MEDICINE

## 2020-06-04 PROCEDURE — G0463 HOSPITAL OUTPT CLINIC VISIT: HCPCS | Mod: 25,ZF

## 2020-06-04 PROCEDURE — 25000128 H RX IP 250 OP 636: Mod: ZF | Performed by: INTERNAL MEDICINE

## 2020-06-04 PROCEDURE — 36415 COLL VENOUS BLD VENIPUNCTURE: CPT

## 2020-06-04 PROCEDURE — 90723 DTAP-HEP B-IPV VACCINE IM: CPT | Mod: ZF | Performed by: INTERNAL MEDICINE

## 2020-06-04 RX ORDER — LISINOPRIL 10 MG/1
5 TABLET ORAL DAILY
Qty: 30 TABLET | Refills: 3 | Status: SHIPPED | OUTPATIENT
Start: 2020-06-04 | End: 2020-06-25

## 2020-06-04 RX ADMIN — HAEMOPHILUS B POLYSACCHARIDE CONJUGATE VACCINE FOR INJ 0.5 ML: RECON SOLN at 11:57

## 2020-06-04 RX ADMIN — PNEUMOCOCCAL 13-VALENT CONJUGATE VACCINE 0.5 ML: 2.2; 2.2; 2.2; 2.2; 2.2; 4.4; 2.2; 2.2; 2.2; 2.2; 2.2; 2.2; 2.2 INJECTION, SUSPENSION INTRAMUSCULAR at 11:51

## 2020-06-04 RX ADMIN — ROMIPLOSTIM 750 MCG: 500 INJECTION, POWDER, LYOPHILIZED, FOR SOLUTION SUBCUTANEOUS at 11:52

## 2020-06-04 RX ADMIN — DIPHTHERIA AND TETANUS TOXOIDS AND ACELLULAR PERTUSSIS ADSORBED, HEPATITIS B (RECOMBINANT) AND INACTIVATED POLIOVIRUS VACCINE COMBINED 0.5 ML: 25; 10; 25; 25; 8; 10; 40; 8; 32 INJECTION, SUSPENSION INTRAMUSCULAR at 11:52

## 2020-06-04 RX ADMIN — ZOSTER VACCINE RECOMBINANT, ADJUVANTED 0.5 ML: KIT at 11:54

## 2020-06-04 ASSESSMENT — PAIN SCALES - GENERAL: PAINLEVEL: NO PAIN (0)

## 2020-06-04 NOTE — LETTER
6/4/2020      RE: Angel Ynaez  [address]  [place] [address]       BMT Clinic Progress Note    Patient ID: Angel Yanez is a 58 yo man 1 year s/p 2nd autologous transplant for MM.     Interval history:     Guille returns for nplate and follow up. He has no complaints - happy plts continue to trend up. Wondering at what point we stop nplate. He denies any infectious concerns. Planning on going to [place] to see his kids next week. Agreeable to vaccines today.     ROS: 8 point ROS neg unless specified above.       Physical Exam:   Blood pressure 120/68, pulse 74, temperature 97.5  F (36.4  C), temperature source Tympanic, resp. rate 16, weight 80.3 kg (177 lb), SpO2 98 %.    Wt Readings from Last 4 Encounters:   05/28/20 80.9 kg (178 lb 4.8 oz)   05/21/20 80.4 kg (177 lb 4.8 oz)   05/19/20 80 kg (176 lb 4.8 oz)   05/15/20 80.3 kg (177 lb)     General: NAD, engaged  ENT: oral mucosa moist without ulceration, No OP erythema or petechiae.  Lungs: CTAB  Cardiovascular: RRR, S1, S2, no m/r/g  GI/Abdomen: +BS, soft, nontender, non-distended  Skin: No rashes or petechaie.    MSK/Extremities: Warm, well perfused.  Neurologic: alert, and conversant  Access: peripheral    Data:     Lab Results   Component Value Date    WBC 3.9 (L) 05/28/2020    ANEU 2.3 05/28/2020    HGB 10.5 (L) 05/28/2020    HCT 31.4 (L) 05/28/2020    PLT 68 (L) 05/28/2020     05/28/2020    POTASSIUM 3.7 05/28/2020    CHLORIDE 105 05/28/2020    CO2 26 05/28/2020     (H) 05/28/2020    BUN 19 05/28/2020    CR 0.92 05/28/2020    MAG 2.2 05/19/2020    INR 1.03 04/30/2020    BILITOTAL 0.3 05/28/2020    AST 19 05/28/2020    ALT 22 05/28/2020    ALKPHOS 78 05/28/2020    PROTTOTAL 6.9 05/28/2020    ALBUMIN 3.9 05/28/2020       Assessment and Plan:     Angel Yanez is a 58 yo man 1 year s/p 2nd autologous transplant for MM with ongoing cytopenias.     1.  BMT/MM:   Slow engraftment; cell dose was only 0.639x10^6. (Marrow [place] -  known poor cell dose as failed chemo-mobilization 1/2019.)   - day +180 bone marrow biopsy 11/6/19 which showed no morphologic or immunophenotypic evidence of plasma cell neoplasm. His light chains were not elevated and he had no monoclonal protein in the serum.  He is in clinical remission.  - PET in 8/2019 clear.   - 1 year bmbx See Naveed's complete note: no monoclonal protein (his disease was originally IgA kappa) and other chemistry studies showed no signs of residual disease. An x-ray bone survey shows no change from 1 year earlier and a PET CT scan shows no hypermetabolic lesions except the right anterior third rib... It does not look to be a myeloma lesion but rather a subacute rib fracture with hypermetabolic signs of healing. Myeloma in remission, however marrow is hypocellular.    2.  HEME: Keep Hgb>8g/dL, plt >10k.   - Persistant profound thrombocytopenia. Experiencing some improvement on Nplate (started 3/19). Now on max dose 10mcg/kg. plts up to 80k  -Per Dr Lucio once plts 100k then decrease nplate to every other week and see if plts can be maintained on less frequent dosing.   - Previous trial of promacta not helpful. Stopped 12/6/19. Trial of prednisone not helpful, Pred taper complete on 4/16.      3.  ID: afebrile   - FUOs: Extensive ID work up- no etiology identified. Now afebrile   -Given 1 year vaccine today 6/4/20    Prophy: ACV, Fluconazole & pentamidine (3/26). Per Dr. Lucio OK to discontinue PCP proph  - influenza vaccination given 11/26/19     4. GI:  no complaints.      5. CV: Hx of steroid induced HTN, remains hypertensive now.  C  -Had been on Lisinopril 10mg daily. He was not hypertensive before taking steroids throughout the year. Discussed BP seems well controlled 110-120/70-60 - he does have some mild dizziness on standing- okay to trail lisinopril 5mg daily- monitor BP.      6.  FEN/Renal: Cr and lytes WNL     7.  Mood: Continue Paxil.     8. FUOs-  Some kind of auto immune  process DDX:  Sarcoidosis? Underwent EBUS FNA which was negative for granulomas however unlikely you could see this on just fluid aspirate so would not consider       RTC 1 week for next dose nplate, labs and provider visit.   - when plts 100k decrease to QOW appts/nplate    Myrna Marrufo PA-C  [phone]          BMT Advanced Practice Provider

## 2020-06-04 NOTE — NURSING NOTE
Oncology Rooming Note    Vitals: WDL  Allergies Reviewed: Yes  Medication Reviewed: Yes  Medication Refills Needed: No  Clinical Concerns: None.  Patient has no complaints of pain.  Patient has no complaints of nausea.    Marry Astorga RN

## 2020-06-04 NOTE — LETTER
6/4/2020         RE: Angel Yanez  [address]  [place] [address]        Dear Colleague,    Thank you for referring your patient, Angel Yanez, to the [facility]. Please see a copy of my visit note below.    BMT Clinic Progress Note    Patient ID: Angel Yanez is a 60 yo man 1 year s/p 2nd autologous transplant for MM.     Interval history:     Guille returns for nplate and follow up. He has no complaints - happy plts continue to trend up. Wondering at what point we stop nplate. He denies any infectious concerns. Planning on going to [place] to see his kids next week. Agreeable to vaccines today.     ROS: 8 point ROS neg unless specified above.       Physical Exam:   Blood pressure 120/68, pulse 74, temperature 97.5  F (36.4  C), temperature source Tympanic, resp. rate 16, weight 80.3 kg (177 lb), SpO2 98 %.    Wt Readings from Last 4 Encounters:   05/28/20 80.9 kg (178 lb 4.8 oz)   05/21/20 80.4 kg (177 lb 4.8 oz)   05/19/20 80 kg (176 lb 4.8 oz)   05/15/20 80.3 kg (177 lb)     General: NAD, engaged  ENT: oral mucosa moist without ulceration, No OP erythema or petechiae.  Lungs: CTAB  Cardiovascular: RRR, S1, S2, no m/r/g  GI/Abdomen: +BS, soft, nontender, non-distended  Skin: No rashes or petechaie.    MSK/Extremities: Warm, well perfused.  Neurologic: alert, and conversant  Access: peripheral    Data:     Lab Results   Component Value Date    WBC 3.9 (L) 05/28/2020    ANEU 2.3 05/28/2020    HGB 10.5 (L) 05/28/2020    HCT 31.4 (L) 05/28/2020    PLT 68 (L) 05/28/2020     05/28/2020    POTASSIUM 3.7 05/28/2020    CHLORIDE 105 05/28/2020    CO2 26 05/28/2020     (H) 05/28/2020    BUN 19 05/28/2020    CR 0.92 05/28/2020    MAG 2.2 05/19/2020    INR 1.03 04/30/2020    BILITOTAL 0.3 05/28/2020    AST 19 05/28/2020    ALT 22 05/28/2020    ALKPHOS 78 05/28/2020    PROTTOTAL 6.9 05/28/2020    ALBUMIN 3.9 05/28/2020       Assessment and Plan:     Angel Yanez  is a 60 yo man 1 year s/p 2nd autologous transplant for MM with ongoing cytopenias.     1.  BMT/MM:   Slow engraftment; cell dose was only 0.639x10^6. (Marrow [place] - known poor cell dose as failed chemo-mobilization 1/2019.)   - day +180 bone marrow biopsy 11/6/19 which showed no morphologic or immunophenotypic evidence of plasma cell neoplasm. His light chains were not elevated and he had no monoclonal protein in the serum.  He is in clinical remission.  - PET in 8/2019 clear.   - 1 year bmbx See Naveed's complete note: no monoclonal protein (his disease was originally IgA kappa) and other chemistry studies showed no signs of residual disease. An x-ray bone survey shows no change from 1 year earlier and a PET CT scan shows no hypermetabolic lesions except the right anterior third rib... It does not look to be a myeloma lesion but rather a subacute rib fracture with hypermetabolic signs of healing. Myeloma in remission, however marrow is hypocellular.    2.  HEME: Keep Hgb>8g/dL, plt >10k.   - Persistant profound thrombocytopenia. Experiencing some improvement on Nplate (started 3/19). Now on max dose 10mcg/kg. plts up to 80k  -Per Dr Lucio once plts 100k then decrease nplate to every other week and see if plts can be maintained on less frequent dosing.   - Previous trial of promacta not helpful. Stopped 12/6/19. Trial of prednisone not helpful, Pred taper complete on 4/16.      3.  ID: afebrile   - FUOs: Extensive ID work up- no etiology identified. Now afebrile   -Given 1 year vaccine today 6/4/20    Prophy: ACV, Fluconazole & pentamidine (3/26). Per Dr. Lucio OK to discontinue PCP proph  - influenza vaccination given 11/26/19     4. GI:  no complaints.      5. CV: Hx of steroid induced HTN, remains hypertensive now.  C  -Had been on Lisinopril 10mg daily. He was not hypertensive before taking steroids throughout the year. Discussed BP seems well controlled 110-120/70-60 - he does have some mild  dizziness on standing- okay to trail lisinopril 5mg daily- monitor BP.      6.  FEN/Renal: Cr and lytes WNL     7.  Mood: Continue Paxil.     8. FUOs-  Some kind of auto immune process DDX:  Sarcoidosis? Underwent EBUS FNA which was negative for granulomas however unlikely you could see this on just fluid aspirate so would not consider       RTC 1 week for next dose nplate, labs and provider visit.   - when plts 100k decrease to QOW appts/nplate    Myrna Marrufo PA-C  [phone]          Again, thank you for allowing me to participate in the care of your patient.        Sincerely,        BMT Advanced Practice Provider

## 2020-06-10 NOTE — PROGRESS NOTES
BMT Clinic Progress Note    Patient ID: Angel Yanez is a 60 yo man 1 year s/p 2nd autologous transplant for MM.     Interval history:     Guille returns for nplate and follow up. No new medical complaints. Received vaccines last week without issues. Had some mild itching at the injection site for a day or so but no other issues. Denies any infectious concerns. Lightheadedness better with drop in lisinopril last week.     ROS: 8 point ROS neg unless specified above.       Physical Exam:   Blood pressure 121/84, pulse 79, temperature 99  F (37.2  C), temperature source Oral, resp. rate 16, weight 80.5 kg (177 lb 6.4 oz), SpO2 98 %.    Wt Readings from Last 4 Encounters:   06/11/20 80.5 kg (177 lb 6.4 oz)   06/04/20 80.3 kg (177 lb)   05/28/20 80.9 kg (178 lb 4.8 oz)   05/21/20 80.4 kg (177 lb 4.8 oz)     General: NAD, engaged  Lungs: CTAB  Cardiovascular: RRR, S1, S2, no m/r/g  GI/Abdomen: +BS, soft, nontender, non-distended  Skin: No rashes or petechaie.    MSK/Extremities: Warm, well perfused.  Neurologic: alert, and conversant  Access: peripheral    Data:     Lab Results   Component Value Date    WBC 3.9 (L) 06/04/2020    ANEU 2.1 06/04/2020    HGB 11.3 (L) 06/04/2020    HCT 33.2 (L) 06/04/2020    PLT 80 (L) 06/04/2020     06/04/2020    POTASSIUM 4.1 06/04/2020    CHLORIDE 108 06/04/2020    CO2 25 06/04/2020    GLC 92 06/04/2020    BUN 17 06/04/2020    CR 0.86 06/04/2020    MAG 2.2 05/19/2020    INR 1.03 04/30/2020    BILITOTAL 0.4 06/04/2020    AST 18 06/04/2020    ALT 24 06/04/2020    ALKPHOS 75 06/04/2020    PROTTOTAL 7.2 06/04/2020    ALBUMIN 4.0 06/04/2020       Assessment and Plan:     Angel Yanez is a 60 yo man 1 year s/p 2nd autologous transplant for MM with ongoing cytopenias.     1.  BMT/MM:   Slow engraftment; cell dose was only 0.639x10^6. (Marrow [place] - known poor cell dose as failed chemo-mobilization 1/2019.)   - day +180 bone marrow biopsy 11/6/19 which showed no morphologic or  immunophenotypic evidence of plasma cell neoplasm. His light chains were not elevated and he had no monoclonal protein in the serum.  He is in clinical remission.  - PET in 8/2019 clear.   - 1 year bmbx See Naveed's complete note: no monoclonal protein (his disease was originally IgA kappa) and other chemistry studies showed no signs of residual disease. An x-ray bone survey shows no change from 1 year earlier and a PET CT scan shows no hypermetabolic lesions except the right anterior third rib... It does not look to be a myeloma lesion but rather a subacute rib fracture with hypermetabolic signs of healing. Myeloma in remission, however marrow is hypocellular.    2.  HEME: Keep Hgb>8g/dL, plt >10k.   - Persistant profound thrombocytopenia. Experiencing improvement on Nplate (started 3/19). Now on max dose 10mcg/kg. plts up to 83k  -Per Dr Lucio once plts 100k then decrease nplate to every other week and see if plts can be maintained on less frequent dosing.   - Previous trial of promacta not helpful. Stopped 12/6/19. Trial of prednisone not helpful, Pred taper complete on 4/16.      3.  ID: afebrile   - FUOs: Extensive ID work up- no etiology identified. Now afebrile   - Given 1 year vaccines 6/4/20, will need boosters in 2 months.     Prophy: ACV, Fluconazole & pentamidine (3/26). Per Dr. Lucio OK to discontinue PCP proph  - influenza vaccination given 11/26/19     4. CV: Hx of steroid induced HTN  -Had been on Lisinopril 10mg daily. Dropped to 5mg daily 6/4 d/t lightheadedness. Symptoms improved. BPs 120s/80, continue this dose for now.      5.  FEN/Renal: Cr and lytes WNL     6.  Mood: Continue Paxil.     7. FUOs-  Some kind of auto immune process DDX:  Sarcoidosis? Underwent EBUS FNA which was negative for granulomas however unlikely you could see this on just fluid aspirate. FUOs resolved with steroids and has not recurred.       RTC 1 week for next dose nplate, labs and provider visit.   - when plts  100k decrease to QOW appts/nplate    Fei Meng PA-C  [phone]

## 2020-06-11 ENCOUNTER — APPOINTMENT (OUTPATIENT)
Dept: LAB | Facility: CLINIC | Age: 60
End: 2020-06-11
Attending: INTERNAL MEDICINE
Payer: COMMERCIAL

## 2020-06-11 ENCOUNTER — ONCOLOGY VISIT (OUTPATIENT)
Dept: TRANSPLANT | Facility: CLINIC | Age: 60
End: 2020-06-11
Attending: INTERNAL MEDICINE
Payer: COMMERCIAL

## 2020-06-11 VITALS
OXYGEN SATURATION: 98 % | BODY MASS INDEX: 25.45 KG/M2 | WEIGHT: 177.4 LBS | HEART RATE: 79 BPM | TEMPERATURE: 99 F | DIASTOLIC BLOOD PRESSURE: 84 MMHG | SYSTOLIC BLOOD PRESSURE: 121 MMHG | RESPIRATION RATE: 16 BRPM

## 2020-06-11 DIAGNOSIS — Z94.81 STATUS POST BONE MARROW TRANSPLANT (H): ICD-10-CM

## 2020-06-11 DIAGNOSIS — D69.6 THROMBOCYTOPENIA (H): Primary | ICD-10-CM

## 2020-06-11 PROCEDURE — 36415 COLL VENOUS BLD VENIPUNCTURE: CPT

## 2020-06-11 PROCEDURE — 25000128 H RX IP 250 OP 636: Mod: ZF | Performed by: PHYSICIAN ASSISTANT

## 2020-06-11 PROCEDURE — 96372 THER/PROPH/DIAG INJ SC/IM: CPT

## 2020-06-11 PROCEDURE — G0463 HOSPITAL OUTPT CLINIC VISIT: HCPCS | Mod: 25

## 2020-06-11 RX ORDER — ALBUTEROL SULFATE 5 MG/ML
2.5 SOLUTION RESPIRATORY (INHALATION)
Status: CANCELLED
Start: 2020-06-18

## 2020-06-11 RX ORDER — HEPARIN SODIUM,PORCINE 10 UNIT/ML
5 VIAL (ML) INTRAVENOUS
Status: CANCELLED | OUTPATIENT
Start: 2020-06-18

## 2020-06-11 RX ORDER — PENTAMIDINE ISETHIONATE 300 MG/300MG
300 INHALANT RESPIRATORY (INHALATION)
Status: CANCELLED
Start: 2020-06-18

## 2020-06-11 RX ADMIN — ROMIPLOSTIM 750 MCG: 500 INJECTION, POWDER, LYOPHILIZED, FOR SOLUTION SUBCUTANEOUS at 11:30

## 2020-06-11 ASSESSMENT — PAIN SCALES - GENERAL: PAINLEVEL: NO PAIN (0)

## 2020-06-11 NOTE — PROGRESS NOTES
Chief Complaint   Patient presents with     Oncology Clinic Visit     Return Multiple Myeloma; Post transplant     Blood Draw     Vitals and blood drawn by LPN. Pt checked into olimpiat.      DEBBIE Hernandez LPN

## 2020-06-11 NOTE — LETTER
6/11/2020         RE: Angel Yanez  [address]  [facility] [address]        Dear Colleague,    Thank you for referring your patient, Angel Yanez, to the [facility] BLOOD AND MARROW TRANSPLANT. Please see a copy of my visit note below.    BMT Clinic Progress Note    Patient ID: Angel Yanez is a 60 yo man 1 year s/p 2nd autologous transplant for MM.     Interval history:     Guille returns for nplate and follow up. No new medical complaints. Received vaccines last week without issues. Had some mild itching at the injection site for a day or so but no other issues. Denies any infectious concerns. Lightheadedness better with drop in lisinopril last week.     ROS: 8 point ROS neg unless specified above.       Physical Exam:   Blood pressure 121/84, pulse 79, temperature 99  F (37.2  C), temperature source Oral, resp. rate 16, weight 80.5 kg (177 lb 6.4 oz), SpO2 98 %.    Wt Readings from Last 4 Encounters:   06/11/20 80.5 kg (177 lb 6.4 oz)   06/04/20 80.3 kg (177 lb)   05/28/20 80.9 kg (178 lb 4.8 oz)   05/21/20 80.4 kg (177 lb 4.8 oz)     General: NAD, engaged  Lungs: CTAB  Cardiovascular: RRR, S1, S2, no m/r/g  GI/Abdomen: +BS, soft, nontender, non-distended  Skin: No rashes or petechaie.    MSK/Extremities: Warm, well perfused.  Neurologic: alert, and conversant  Access: peripheral    Data:     Lab Results   Component Value Date    WBC 3.9 (L) 06/04/2020    ANEU 2.1 06/04/2020    HGB 11.3 (L) 06/04/2020    HCT 33.2 (L) 06/04/2020    PLT 80 (L) 06/04/2020     06/04/2020    POTASSIUM 4.1 06/04/2020    CHLORIDE 108 06/04/2020    CO2 25 06/04/2020    GLC 92 06/04/2020    BUN 17 06/04/2020    CR 0.86 06/04/2020    MAG 2.2 05/19/2020    INR 1.03 04/30/2020    BILITOTAL 0.4 06/04/2020    AST 18 06/04/2020    ALT 24 06/04/2020    ALKPHOS 75 06/04/2020    PROTTOTAL 7.2 06/04/2020    ALBUMIN 4.0 06/04/2020       Assessment and Plan:     Angel Yanez is a 60 yo man 1 year s/p 2nd autologous  transplant for MM with ongoing cytopenias.     1.  BMT/MM:   Slow engraftment; cell dose was only 0.639x10^6. (Marrow [place] - known poor cell dose as failed chemo-mobilization 1/2019.)   - day +180 bone marrow biopsy 11/6/19 which showed no morphologic or immunophenotypic evidence of plasma cell neoplasm. His light chains were not elevated and he had no monoclonal protein in the serum.  He is in clinical remission.  - PET in 8/2019 clear.   - 1 year bmbx See Naveed's complete note: no monoclonal protein (his disease was originally IgA kappa) and other chemistry studies showed no signs of residual disease. An x-ray bone survey shows no change from 1 year earlier and a PET CT scan shows no hypermetabolic lesions except the right anterior third rib... It does not look to be a myeloma lesion but rather a subacute rib fracture with hypermetabolic signs of healing. Myeloma in remission, however marrow is hypocellular.    2.  HEME: Keep Hgb>8g/dL, plt >10k.   - Persistant profound thrombocytopenia. Experiencing improvement on Nplate (started 3/19). Now on max dose 10mcg/kg. plts up to 83k  -Per Dr Lucio once plts 100k then decrease nplate to every other week and see if plts can be maintained on less frequent dosing.   - Previous trial of promacta not helpful. Stopped 12/6/19. Trial of prednisone not helpful, Pred taper complete on 4/16.      3.  ID: afebrile   - FUOs: Extensive ID work up- no etiology identified. Now afebrile   - Given 1 year vaccines 6/4/20, will need boosters in 2 months.     Prophy: ACV, Fluconazole & pentamidine (3/26). Per Dr. Lucio OK to discontinue PCP proph  - influenza vaccination given 11/26/19     4. CV: Hx of steroid induced HTN  -Had been on Lisinopril 10mg daily. Dropped to 5mg daily 6/4 d/t lightheadedness. Symptoms improved. BPs 120s/80, continue this dose for now.      5.  FEN/Renal: Cr and lytes WNL     6.  Mood: Continue Paxil.     7. FUOs-  Some kind of auto immune  process DDX:  Sarcoidosis? Underwent EBUS FNA which was negative for granulomas however unlikely you could see this on just fluid aspirate. FUOs resolved with steroids and has not recurred.       RTC 1 week for next dose nplate, labs and provider visit.   - when plts 100k decrease to QOW appts/nplate    Fei Meng PA-C  [phone]        Chief Complaint   Patient presents with     Oncology Clinic Visit     Return Multiple Myeloma; Post transplant     Blood Draw     Vitals and blood drawn by LPN. Pt checked into citlaly.      DEBBIE Hernandez LPN    Again, thank you for allowing me to participate in the care of your patient.        Sincerely,        BMT Advanced Practice Provider

## 2020-06-11 NOTE — NURSING NOTE
"Oncology Rooming Note    June 11, 2020 10:48 AM   Angel Yanez is a 60 year old male who presents for:    Chief Complaint   Patient presents with     Oncology Clinic Visit     Return Multiple Myeloma; Post transplant     Blood Draw     Vitals and blood drawn by LPN. Pt checked into appt.      Initial Vitals: /84   Pulse 79   Temp 99  F (37.2  C) (Oral)   Resp 16   Wt 80.5 kg (177 lb 6.4 oz)   SpO2 98%   BMI 25.45 kg/m   Estimated body mass index is 25.45 kg/m  as calculated from the following:    Height as of 5/21/20: 1.778 m (5' 10\").    Weight as of this encounter: 80.5 kg (177 lb 6.4 oz). Body surface area is 1.99 meters squared.  No Pain (0) Comment: Data Unavailable   No LMP for male patient.  Allergies reviewed: Yes  Medications reviewed: Yes    Medications: Medication refills not needed today.  Pharmacy name entered into EPIC:    [facility] - [place], [place] - [address] [facility]ARMACY [facility] - [place], [place] - [address] [facility]    Clinical concerns: No new concerns.         Maria Teresa Woods CMA              "

## 2020-06-18 ENCOUNTER — ONCOLOGY VISIT (OUTPATIENT)
Dept: TRANSPLANT | Facility: CLINIC | Age: 60
End: 2020-06-18
Attending: INTERNAL MEDICINE
Payer: COMMERCIAL

## 2020-06-18 ENCOUNTER — APPOINTMENT (OUTPATIENT)
Dept: LAB | Facility: CLINIC | Age: 60
End: 2020-06-18
Attending: INTERNAL MEDICINE
Payer: COMMERCIAL

## 2020-06-18 VITALS
OXYGEN SATURATION: 100 % | WEIGHT: 176.7 LBS | HEART RATE: 61 BPM | SYSTOLIC BLOOD PRESSURE: 151 MMHG | TEMPERATURE: 97.6 F | DIASTOLIC BLOOD PRESSURE: 75 MMHG | RESPIRATION RATE: 16 BRPM | BODY MASS INDEX: 25.35 KG/M2

## 2020-06-18 DIAGNOSIS — Z94.81 STATUS POST BONE MARROW TRANSPLANT (H): ICD-10-CM

## 2020-06-18 DIAGNOSIS — D69.6 THROMBOCYTOPENIA (H): Primary | ICD-10-CM

## 2020-06-18 LAB
ALBUMIN SERPL-MCNC: 4.1 G/DL (ref 3.4–5)
ALP SERPL-CCNC: 78 U/L (ref 40–150)
ALT SERPL W P-5'-P-CCNC: 25 U/L (ref 0–70)
ANION GAP SERPL CALCULATED.3IONS-SCNC: 5 MMOL/L (ref 3–14)
AST SERPL W P-5'-P-CCNC: 15 U/L (ref 0–45)
BASOPHILS # BLD AUTO: 0.1 10E9/L (ref 0–0.2)
BASOPHILS NFR BLD AUTO: 1 %
BILIRUB SERPL-MCNC: 0.4 MG/DL (ref 0.2–1.3)
BUN SERPL-MCNC: 15 MG/DL (ref 7–30)
CALCIUM SERPL-MCNC: 9 MG/DL (ref 8.5–10.1)
CHLORIDE SERPL-SCNC: 107 MMOL/L (ref 94–109)
CO2 SERPL-SCNC: 27 MMOL/L (ref 20–32)
CREAT SERPL-MCNC: 0.89 MG/DL (ref 0.66–1.25)
DIFFERENTIAL METHOD BLD: ABNORMAL
EOSINOPHIL # BLD AUTO: 0.5 10E9/L (ref 0–0.7)
EOSINOPHIL NFR BLD AUTO: 10.4 %
ERYTHROCYTE [DISTWIDTH] IN BLOOD BY AUTOMATED COUNT: 11.8 % (ref 10–15)
GFR SERPL CREATININE-BSD FRML MDRD: >90 ML/MIN/{1.73_M2}
GLUCOSE SERPL-MCNC: 90 MG/DL (ref 70–99)
HCT VFR BLD AUTO: 34.9 % (ref 40–53)
HGB BLD-MCNC: 11.6 G/DL (ref 13.3–17.7)
IMM GRANULOCYTES # BLD: 0 10E9/L (ref 0–0.4)
IMM GRANULOCYTES NFR BLD: 0.4 %
LYMPHOCYTES # BLD AUTO: 0.9 10E9/L (ref 0.8–5.3)
LYMPHOCYTES NFR BLD AUTO: 18.3 %
MCH RBC QN AUTO: 36.9 PG (ref 26.5–33)
MCHC RBC AUTO-ENTMCNC: 33.2 G/DL (ref 31.5–36.5)
MCV RBC AUTO: 111 FL (ref 78–100)
MONOCYTES # BLD AUTO: 0.7 10E9/L (ref 0–1.3)
MONOCYTES NFR BLD AUTO: 13.9 %
NEUTROPHILS # BLD AUTO: 2.9 10E9/L (ref 1.6–8.3)
NEUTROPHILS NFR BLD AUTO: 56 %
NRBC # BLD AUTO: 0 10*3/UL
NRBC BLD AUTO-RTO: 0 /100
PLATELET # BLD AUTO: 90 10E9/L (ref 150–450)
POTASSIUM SERPL-SCNC: 4.4 MMOL/L (ref 3.4–5.3)
PROT SERPL-MCNC: 7.6 G/DL (ref 6.8–8.8)
RBC # BLD AUTO: 3.14 10E12/L (ref 4.4–5.9)
SODIUM SERPL-SCNC: 139 MMOL/L (ref 133–144)
WBC # BLD AUTO: 5.1 10E9/L (ref 4–11)

## 2020-06-18 PROCEDURE — G0463 HOSPITAL OUTPT CLINIC VISIT: HCPCS | Mod: 25

## 2020-06-18 PROCEDURE — 85025 COMPLETE CBC W/AUTO DIFF WBC: CPT | Performed by: PHYSICIAN ASSISTANT

## 2020-06-18 PROCEDURE — 25000128 H RX IP 250 OP 636: Mod: ZF | Performed by: PHYSICIAN ASSISTANT

## 2020-06-18 PROCEDURE — 36415 COLL VENOUS BLD VENIPUNCTURE: CPT

## 2020-06-18 PROCEDURE — 96372 THER/PROPH/DIAG INJ SC/IM: CPT

## 2020-06-18 PROCEDURE — 80053 COMPREHEN METABOLIC PANEL: CPT | Performed by: PHYSICIAN ASSISTANT

## 2020-06-18 RX ADMIN — ROMIPLOSTIM 750 MCG: 500 INJECTION, POWDER, LYOPHILIZED, FOR SOLUTION SUBCUTANEOUS at 11:46

## 2020-06-18 ASSESSMENT — PAIN SCALES - GENERAL: PAINLEVEL: NO PAIN (0)

## 2020-06-18 NOTE — LETTER
6/18/2020         RE: Angel Yanez  [address]  [place] [facility] [address]        Dear Colleague,    Thank you for referring your patient, Angel Yanez, to the [facility] BLOOD AND MARROW TRANSPLANT. Please see a copy of my visit note below.    BMT Clinic Progress Note    Patient ID: Angel Yanez is a 58 yo man 1 year s/p 2nd autologous transplant for MM.     Interval history:     Guille is doing well. Here for weekly labs and nplate. Plts are improving. He is going to [place] to visit his kids in two weeks and hoping his plts are > 100,000. His BP is high today, but had been normal the last several readings. Did have coffee before his visit today. No fevers or infectious symptoms. No n/v/d/c. No bleeding. Rode his bike 15 miles yesterday.    ROS: 8 point ROS neg unless specified above.       Physical Exam:   Blood pressure (!) 151/75, pulse 61, temperature 97.6  F (36.4  C), temperature source Oral, resp. rate 16, weight 80.2 kg (176 lb 11.2 oz), SpO2 100 %.    Wt Readings from Last 4 Encounters:   06/11/20 80.5 kg (177 lb 6.4 oz)   06/11/20 80.5 kg (177 lb 6.4 oz)   06/04/20 80.3 kg (177 lb)   05/28/20 80.9 kg (178 lb 4.8 oz)     General: NAD, engaged  Lungs: CTAB  Cardiovascular: RRR, S1, S2, no m/r/g  GI/Abdomen: +BS, soft, nontender, non-distended  Skin: No rashes or petechaie.    MSK/Extremities: Warm, well perfused.  Neurologic: alert, and conversant  Access: peripheral    Data:     Lab Results   Component Value Date    WBC 5.1 06/18/2020    ANEU 2.9 06/18/2020    HGB 11.6 (L) 06/18/2020    HCT 34.9 (L) 06/18/2020    PLT 90 (L) 06/18/2020     06/18/2020    POTASSIUM 4.4 06/18/2020    CHLORIDE 107 06/18/2020    CO2 27 06/18/2020    GLC 90 06/18/2020    BUN 15 06/18/2020    CR 0.89 06/18/2020    MAG 2.2 05/19/2020    INR 1.03 04/30/2020    BILITOTAL 0.4 06/18/2020    AST 15 06/18/2020    ALT 25 06/18/2020    ALKPHOS 78 06/18/2020    PROTTOTAL 7.6 06/18/2020    ALBUMIN 4.1 06/18/2020        Assessment and Plan:     Angel Yanez is a 58 yo man 1 year s/p 2nd autologous transplant for MM with ongoing cytopenias.     1.  BMT/MM:   Slow engraftment; cell dose was only 0.639x10^6. (Marrow [place] - known poor cell dose as failed chemo-mobilization 1/2019.)   - day +180 bone marrow biopsy 11/6/19 which showed no morphologic or immunophenotypic evidence of plasma cell neoplasm. His light chains were not elevated and he had no monoclonal protein in the serum.  He is in clinical remission.  - PET in 8/2019 clear.   - 1 year bmbx See Naveed's complete note: no monoclonal protein (his disease was originally IgA kappa) and other chemistry studies showed no signs of residual disease. An x-ray bone survey shows no change from 1 year earlier and a PET CT scan shows no hypermetabolic lesions except the right anterior third rib... It does not look to be a myeloma lesion but rather a subacute rib fracture with hypermetabolic signs of healing. Myeloma in remission, however marrow is hypocellular.    2.  HEME: Keep Hgb>8g/dL, plt >10k.   - Persistant profound thrombocytopenia. Experiencing improvement on Nplate (started 3/19). Now on max dose 10mcg/kg. plts up to 90k  -Per Dr Lucio once plts 100k then decrease nplate to every other week and see if plts can be maintained on less frequent dosing.   - Previous trial of promacta not helpful. Stopped 12/6/19. Trial of prednisone not helpful, Pred taper complete on 4/16.      3.  ID: afebrile   - FUOs: Extensive ID work up- no etiology identified. Now afebrile   - Given 1 year vaccines 6/4/20, will need boosters in 2 months.     Prophy: ACV, Fluconazole & pentamidine (3/26). Per Dr. Lucio OK to discontinue PCP proph  - influenza vaccination given 11/26/19     4. CV: Hx of steroid induced HTN  - Had been on Lisinopril 10mg daily. Dropped to 5mg daily 6/4 d/t lightheadedness. Symptoms improved. BPs 120s/80, continue this dose for now. Had a higher BP reading x 1  on 6/18, no changes to dose today, but if more consistently elevated would return to higher dose     5.  FEN/Renal: Cr and lytes WNL     6.  Mood: Continue Paxil.     7. FUOs - Some kind of auto immune process DDX: sarcoidosis? Underwent EBUS FNA which was negative for granulomas however unlikely you could see this on just fluid aspirate. FUOs resolved with steroids and has not recurred.     RTC 1 week for next dose nplate, labs and provider visit.   - when plts 100k decrease to QOW appts/nplate    Alejandra Adorno PA-C  [phone]          Again, thank you for allowing me to participate in the care of your patient.        Sincerely,        BMT Advanced Practice Provider

## 2020-06-18 NOTE — PROGRESS NOTES
BMT Clinic Progress Note    Patient ID: Angel Yanez is a 60 yo man 1 year s/p 2nd autologous transplant for MM.     Interval history:     Guille is doing well. Here for weekly labs and nplate. Plts are improving. He is going to [place] to visit his kids in two weeks and hoping his plts are > 100,000. His BP is high today, but had been normal the last several readings. Did have coffee before his visit today. No fevers or infectious symptoms. No n/v/d/c. No bleeding. Rode his bike 15 miles yesterday.    ROS: 8 point ROS neg unless specified above.       Physical Exam:   Blood pressure (!) 151/75, pulse 61, temperature 97.6  F (36.4  C), temperature source Oral, resp. rate 16, weight 80.2 kg (176 lb 11.2 oz), SpO2 100 %.    Wt Readings from Last 4 Encounters:   06/11/20 80.5 kg (177 lb 6.4 oz)   06/11/20 80.5 kg (177 lb 6.4 oz)   06/04/20 80.3 kg (177 lb)   05/28/20 80.9 kg (178 lb 4.8 oz)     General: NAD, engaged  Lungs: CTAB  Cardiovascular: RRR, S1, S2, no m/r/g  GI/Abdomen: +BS, soft, nontender, non-distended  Skin: No rashes or petechaie.    MSK/Extremities: Warm, well perfused.  Neurologic: alert, and conversant  Access: peripheral    Data:     Lab Results   Component Value Date    WBC 5.1 06/18/2020    ANEU 2.9 06/18/2020    HGB 11.6 (L) 06/18/2020    HCT 34.9 (L) 06/18/2020    PLT 90 (L) 06/18/2020     06/18/2020    POTASSIUM 4.4 06/18/2020    CHLORIDE 107 06/18/2020    CO2 27 06/18/2020    GLC 90 06/18/2020    BUN 15 06/18/2020    CR 0.89 06/18/2020    MAG 2.2 05/19/2020    INR 1.03 04/30/2020    BILITOTAL 0.4 06/18/2020    AST 15 06/18/2020    ALT 25 06/18/2020    ALKPHOS 78 06/18/2020    PROTTOTAL 7.6 06/18/2020    ALBUMIN 4.1 06/18/2020       Assessment and Plan:     Angel Yanez is a 60 yo man 1 year s/p 2nd autologous transplant for MM with ongoing cytopenias.     1.  BMT/MM:   Slow engraftment; cell dose was only 0.639x10^6. (Marrow [place] - known poor cell dose as failed chemo-mobilization  1/2019.)   - day +180 bone marrow biopsy 11/6/19 which showed no morphologic or immunophenotypic evidence of plasma cell neoplasm. His light chains were not elevated and he had no monoclonal protein in the serum.  He is in clinical remission.  - PET in 8/2019 clear.   - 1 year bmbx See Naveed's complete note: no monoclonal protein (his disease was originally IgA kappa) and other chemistry studies showed no signs of residual disease. An x-ray bone survey shows no change from 1 year earlier and a PET CT scan shows no hypermetabolic lesions except the right anterior third rib... It does not look to be a myeloma lesion but rather a subacute rib fracture with hypermetabolic signs of healing. Myeloma in remission, however marrow is hypocellular.    2.  HEME: Keep Hgb>8g/dL, plt >10k.   - Persistant profound thrombocytopenia. Experiencing improvement on Nplate (started 3/19). Now on max dose 10mcg/kg. plts up to 90k  -Per Dr Lucio once plts 100k then decrease nplate to every other week and see if plts can be maintained on less frequent dosing.   - Previous trial of promacta not helpful. Stopped 12/6/19. Trial of prednisone not helpful, Pred taper complete on 4/16.      3.  ID: afebrile   - FUOs: Extensive ID work up- no etiology identified. Now afebrile   - Given 1 year vaccines 6/4/20, will need boosters in 2 months.     Prophy: ACV, Fluconazole & pentamidine (3/26). Per Dr. Lucio OK to discontinue PCP proph  - influenza vaccination given 11/26/19     4. CV: Hx of steroid induced HTN  - Had been on Lisinopril 10mg daily. Dropped to 5mg daily 6/4 d/t lightheadedness. Symptoms improved. BPs 120s/80, continue this dose for now. Had a higher BP reading x 1 on 6/18, no changes to dose today, but if more consistently elevated would return to higher dose     5.  FEN/Renal: Cr and lytes WNL     6.  Mood: Continue Paxil.     7. FUOs - Some kind of auto immune process DDX: sarcoidosis? Underwent EBUS FNA which was negative  for granulomas however unlikely you could see this on just fluid aspirate. FUOs resolved with steroids and has not recurred.     RTC 1 week for next dose nplate, labs and provider visit.   - when plts 100k decrease to QOW appts/nplate    Alejandra Adorno PA-C  [phone]

## 2020-06-18 NOTE — NURSING NOTE
NPlate was given today in clinic in the right arm, patient tolerated well with no complications. See GENEVIEVE HackettA

## 2020-06-18 NOTE — NURSING NOTE
Chief Complaint   Patient presents with     Blood Draw     Labs drawn via  by RN in lab. VS Taken.     Clau Sibley RN

## 2020-06-18 NOTE — NURSING NOTE
"Oncology Rooming Note    June 18, 2020 10:50 AM   Angel Yanez is a 60 year old male who presents for:    Chief Complaint   Patient presents with     Blood Draw     Labs drawn via  by RN in lab. VS Taken.     RECHECK     Multiple Myeloma      Initial Vitals: BP (!) 151/75 (BP Location: Right arm, Patient Position: Sitting, Cuff Size: Adult Regular)   Pulse 61   Temp 97.6  F (36.4  C) (Oral)   Resp 16   Wt 80.2 kg (176 lb 11.2 oz)   SpO2 100%   BMI 25.35 kg/m   Estimated body mass index is 25.35 kg/m  as calculated from the following:    Height as of 5/21/20: 1.778 m (5' 10\").    Weight as of this encounter: 80.2 kg (176 lb 11.2 oz). Body surface area is 1.99 meters squared.  No Pain (0) Comment: Data Unavailable   No LMP for male patient.  Allergies reviewed: Yes  Medications reviewed: Yes    Medications: Medication refills not needed today.  Pharmacy name entered into EPIC:    [facility] - [place], [place] - [address]  [facility] - [place], [place] - [address] [facility]    Clinical concerns: None       Kathy Perez CMA              "

## 2020-06-24 NOTE — PROGRESS NOTES
BMT Clinic Progress Note    Patient ID: Angel Yanez is a 58 yo man 1 year s/p 2nd autologous transplant for MM.     Interval history:     Guille returns for follow up. No new medical complaints. Is a little discouraged that his platelets have somewhat hit a plateau. Is leaving for [place] tomorrow to visit his son. BP still a little elevated. No fevers, chills or infectious symptoms.     ROS: 6 point ROS neg unless specified above.       Physical Exam:   Blood pressure (!) 141/87, pulse 62, temperature 97.5  F (36.4  C), temperature source Oral, resp. rate 16, weight 80.6 kg (177 lb 9.6 oz), SpO2 99 %.    Wt Readings from Last 4 Encounters:   06/25/20 80.6 kg (177 lb 9.6 oz)   06/18/20 80.2 kg (176 lb 11.2 oz)   06/11/20 80.5 kg (177 lb 6.4 oz)   06/11/20 80.5 kg (177 lb 6.4 oz)     General: NAD, engaged  Lungs: CTAB  Cardiovascular: RRR, S1, S2, no m/r/g  GI/Abdomen: +BS, soft, nontender, non-distended  Skin: No rashes or petechaie.    MSK/Extremities: Warm, well perfused.  Neurologic: alert, and conversant    Data:     Lab Results   Component Value Date    WBC 5.4 06/25/2020    ANEU 3.0 06/25/2020    HGB 11.7 (L) 06/25/2020    HCT 35.5 (L) 06/25/2020    PLT 89 (L) 06/25/2020     06/25/2020    POTASSIUM 4.5 06/25/2020    CHLORIDE 108 06/25/2020    CO2 26 06/25/2020    GLC 82 06/25/2020    BUN 16 06/25/2020    CR 0.84 06/25/2020    MAG 2.2 05/19/2020    INR 1.03 04/30/2020    BILITOTAL 0.4 06/25/2020    AST 16 06/25/2020    ALT 24 06/25/2020    ALKPHOS 76 06/25/2020    PROTTOTAL 7.6 06/25/2020    ALBUMIN 4.2 06/25/2020       Assessment and Plan:     Angel Yanez is a 58 yo man 1 year s/p 2nd autologous transplant for MM with ongoing cytopenias.     1.  BMT/MM:   Slow engraftment; cell dose was only 0.639x10^6. (Marrow [place] - known poor cell dose as failed chemo-mobilization 1/2019.)   - day +180 bone marrow biopsy 11/6/19 which showed no morphologic or immunophenotypic evidence of plasma cell  neoplasm. His light chains were not elevated and he had no monoclonal protein in the serum.  He is in clinical remission.  - PET in 8/2019 clear.   - 1 year bmbx See Naveed's complete note: no monoclonal protein (his disease was originally IgA kappa) and other chemistry studies showed no signs of residual disease. An x-ray bone survey shows no change from 1 year earlier and a PET CT scan shows no hypermetabolic lesions except the right anterior third rib... It does not look to be a myeloma lesion but rather a subacute rib fracture with hypermetabolic signs of healing. Myeloma in remission, however marrow is hypocellular.    2.  HEME: Keep Hgb>8g/dL, plt >10k.   - Persistant profound thrombocytopenia. Experiencing improvement on Nplate (started 3/19). Now on max dose 10mcg/kg. plts stable around 90k  -Per Dr Lucio once plts 100k then decrease nplate to every other week and see if plts can be maintained on less frequent dosing. Possibly reaching peak effect of nplate? plts have been 80-90 the last few weeks.    - Previous trial of promacta not helpful. Stopped 12/6/19. Trial of prednisone not helpful, Pred taper complete on 4/16.      3.  ID: afebrile   - FUOs: Extensive ID work up- no etiology identified. Now afebrile   - Given 1 year vaccines 6/4/20, will need boosters in 2 months.     Prophy: ACV, Fluconazole   - influenza vaccination given 11/26/19     4. CV: Hx of steroid induced HTN  - Had been on Lisinopril 10mg daily. Dropped to 5mg daily 6/4 d/t lightheadedness. Symptoms improved. BPs slightly higher the last few weeks. Trial increase in lisinopril to 10mg daily- will stop if lightheadedness returns.      5.  FEN/Renal: Cr and lytes WNL     6.  Mood: Continue Paxil.     7. FUOs - Some kind of auto immune process DDX: sarcoidosis? Underwent EBUS FNA which was negative for granulomas however unlikely you could see this on just fluid aspirate. FUOs resolved with steroids and has not recurred.     RTC 7/7  for next dose nplate, labs and provider visit (will be out of town until then)  - when plts 100k decrease to QOW appts/nplate      Fei Meng PA-C  [phone]

## 2020-06-25 ENCOUNTER — APPOINTMENT (OUTPATIENT)
Dept: LAB | Facility: CLINIC | Age: 60
End: 2020-06-25
Attending: INTERNAL MEDICINE
Payer: COMMERCIAL

## 2020-06-25 ENCOUNTER — ONCOLOGY VISIT (OUTPATIENT)
Dept: TRANSPLANT | Facility: CLINIC | Age: 60
End: 2020-06-25
Attending: INTERNAL MEDICINE
Payer: COMMERCIAL

## 2020-06-25 VITALS
SYSTOLIC BLOOD PRESSURE: 141 MMHG | TEMPERATURE: 97.5 F | RESPIRATION RATE: 16 BRPM | DIASTOLIC BLOOD PRESSURE: 87 MMHG | WEIGHT: 177.6 LBS | BODY MASS INDEX: 25.48 KG/M2 | HEART RATE: 62 BPM | OXYGEN SATURATION: 99 %

## 2020-06-25 DIAGNOSIS — Z94.81 STATUS POST BONE MARROW TRANSPLANT (H): ICD-10-CM

## 2020-06-25 DIAGNOSIS — D69.6 THROMBOCYTOPENIA (H): Primary | ICD-10-CM

## 2020-06-25 LAB
ALBUMIN SERPL-MCNC: 4.2 G/DL (ref 3.4–5)
ALP SERPL-CCNC: 76 U/L (ref 40–150)
ALT SERPL W P-5'-P-CCNC: 24 U/L (ref 0–70)
ANION GAP SERPL CALCULATED.3IONS-SCNC: 4 MMOL/L (ref 3–14)
AST SERPL W P-5'-P-CCNC: 16 U/L (ref 0–45)
BASOPHILS # BLD AUTO: 0.1 10E9/L (ref 0–0.2)
BASOPHILS NFR BLD AUTO: 0.9 %
BILIRUB SERPL-MCNC: 0.4 MG/DL (ref 0.2–1.3)
BUN SERPL-MCNC: 16 MG/DL (ref 7–30)
CALCIUM SERPL-MCNC: 8.8 MG/DL (ref 8.5–10.1)
CHLORIDE SERPL-SCNC: 108 MMOL/L (ref 94–109)
CO2 SERPL-SCNC: 26 MMOL/L (ref 20–32)
CREAT SERPL-MCNC: 0.84 MG/DL (ref 0.66–1.25)
DIFFERENTIAL METHOD BLD: ABNORMAL
EOSINOPHIL # BLD AUTO: 0.5 10E9/L (ref 0–0.7)
EOSINOPHIL NFR BLD AUTO: 8.4 %
ERYTHROCYTE [DISTWIDTH] IN BLOOD BY AUTOMATED COUNT: 11.6 % (ref 10–15)
GFR SERPL CREATININE-BSD FRML MDRD: >90 ML/MIN/{1.73_M2}
GLUCOSE SERPL-MCNC: 82 MG/DL (ref 70–99)
HCT VFR BLD AUTO: 35.5 % (ref 40–53)
HGB BLD-MCNC: 11.7 G/DL (ref 13.3–17.7)
IMM GRANULOCYTES # BLD: 0 10E9/L (ref 0–0.4)
IMM GRANULOCYTES NFR BLD: 0.4 %
LYMPHOCYTES # BLD AUTO: 1.1 10E9/L (ref 0.8–5.3)
LYMPHOCYTES NFR BLD AUTO: 20.6 %
MCH RBC QN AUTO: 36.2 PG (ref 26.5–33)
MCHC RBC AUTO-ENTMCNC: 33 G/DL (ref 31.5–36.5)
MCV RBC AUTO: 110 FL (ref 78–100)
MONOCYTES # BLD AUTO: 0.8 10E9/L (ref 0–1.3)
MONOCYTES NFR BLD AUTO: 14.5 %
NEUTROPHILS # BLD AUTO: 3 10E9/L (ref 1.6–8.3)
NEUTROPHILS NFR BLD AUTO: 55.2 %
NRBC # BLD AUTO: 0 10*3/UL
NRBC BLD AUTO-RTO: 0 /100
PLATELET # BLD AUTO: 89 10E9/L (ref 150–450)
POTASSIUM SERPL-SCNC: 4.5 MMOL/L (ref 3.4–5.3)
PROT SERPL-MCNC: 7.6 G/DL (ref 6.8–8.8)
RBC # BLD AUTO: 3.23 10E12/L (ref 4.4–5.9)
SODIUM SERPL-SCNC: 138 MMOL/L (ref 133–144)
WBC # BLD AUTO: 5.4 10E9/L (ref 4–11)

## 2020-06-25 PROCEDURE — 85025 COMPLETE CBC W/AUTO DIFF WBC: CPT | Performed by: PHYSICIAN ASSISTANT

## 2020-06-25 PROCEDURE — 25000128 H RX IP 250 OP 636: Mod: ZF | Performed by: STUDENT IN AN ORGANIZED HEALTH CARE EDUCATION/TRAINING PROGRAM

## 2020-06-25 PROCEDURE — 36415 COLL VENOUS BLD VENIPUNCTURE: CPT

## 2020-06-25 PROCEDURE — 96372 THER/PROPH/DIAG INJ SC/IM: CPT

## 2020-06-25 PROCEDURE — 80053 COMPREHEN METABOLIC PANEL: CPT | Performed by: PHYSICIAN ASSISTANT

## 2020-06-25 RX ORDER — ALBUTEROL SULFATE 5 MG/ML
2.5 SOLUTION RESPIRATORY (INHALATION)
Status: CANCELLED
Start: 2020-07-18

## 2020-06-25 RX ORDER — HEPARIN SODIUM,PORCINE 10 UNIT/ML
5 VIAL (ML) INTRAVENOUS
Status: CANCELLED | OUTPATIENT
Start: 2020-07-18

## 2020-06-25 RX ORDER — PENTAMIDINE ISETHIONATE 300 MG/300MG
300 INHALANT RESPIRATORY (INHALATION)
Status: CANCELLED
Start: 2020-07-18

## 2020-06-25 RX ORDER — LISINOPRIL 10 MG/1
10 TABLET ORAL DAILY
Qty: 30 TABLET | Refills: 3 | COMMUNITY
Start: 2020-06-25 | End: 2020-10-14

## 2020-06-25 RX ADMIN — ROMIPLOSTIM 750 MCG: 500 INJECTION, POWDER, LYOPHILIZED, FOR SOLUTION SUBCUTANEOUS at 11:18

## 2020-06-25 ASSESSMENT — PAIN SCALES - GENERAL: PAINLEVEL: NO PAIN (0)

## 2020-06-25 NOTE — LETTER
6/25/2020         RE: Angel Yanez  [address]  [facility] [id number]        Dear Colleague,    Thank you for referring your patient, Angel Yanez, to the [facility] BLOOD AND MARROW [facility]ANSPL[facility]. Please see a copy of my visit note below.    BMT Clinic Progress Note    Patient ID: Angel Yanez is a 58 yo man 1 year s/p 2nd autologous transplant for MM.     Interval history:     Guille returns for follow up. No new medical complaints. Is a little discouraged that his platelets have somewhat hit a plateau. Is leaving for [place] tomorrow to visit his son. BP still a little elevated. No fevers, chills or infectious symptoms.     ROS: 6 point ROS neg unless specified above.       Physical Exam:   Blood pressure (!) 141/87, pulse 62, temperature 97.5  F (36.4  C), temperature source Oral, resp. rate 16, weight 80.6 kg (177 lb 9.6 oz), SpO2 99 %.    Wt Readings from Last 4 Encounters:   06/25/20 80.6 kg (177 lb 9.6 oz)   06/18/20 80.2 kg (176 lb 11.2 oz)   06/11/20 80.5 kg (177 lb 6.4 oz)   06/11/20 80.5 kg (177 lb 6.4 oz)     General: NAD, engaged  Lungs: CTAB  Cardiovascular: RRR, S1, S2, no m/r/g  GI/Abdomen: +BS, soft, nontender, non-distended  Skin: No rashes or petechaie.    MSK/Extremities: Warm, well perfused.  Neurologic: alert, and conversant    Data:     Lab Results   Component Value Date    WBC 5.4 06/25/2020    ANEU 3.0 06/25/2020    HGB 11.7 (L) 06/25/2020    HCT 35.5 (L) 06/25/2020    PLT 89 (L) 06/25/2020     06/25/2020    POTASSIUM 4.5 06/25/2020    CHLORIDE 108 06/25/2020    CO2 26 06/25/2020    GLC 82 06/25/2020    BUN 16 06/25/2020    CR 0.84 06/25/2020    MAG 2.2 05/19/2020    INR 1.03 04/30/2020    BILITOTAL 0.4 06/25/2020    AST 16 06/25/2020    ALT 24 06/25/2020    ALKPHOS 76 06/25/2020    PROTTOTAL 7.6 06/25/2020    ALBUMIN 4.2 06/25/2020       Assessment and Plan:     Angel Yanez is a 58 yo man 1 year s/p 2nd autologous transplant for MM with ongoing cytopenias.     1.   BMT/MM:   Slow engraftment; cell dose was only 0.639x10^6. (Marrow [place] - known poor cell dose as failed chemo-mobilization 1/2019.)   - day +180 bone marrow biopsy 11/6/19 which showed no morphologic or immunophenotypic evidence of plasma cell neoplasm. His light chains were not elevated and he had no monoclonal protein in the serum.  He is in clinical remission.  - PET in 8/2019 clear.   - 1 year bmbx See Naveed's complete note: no monoclonal protein (his disease was originally IgA kappa) and other chemistry studies showed no signs of residual disease. An x-ray bone survey shows no change from 1 year earlier and a PET CT scan shows no hypermetabolic lesions except the right anterior third rib... It does not look to be a myeloma lesion but rather a subacute rib fracture with hypermetabolic signs of healing. Myeloma in remission, however marrow is hypocellular.    2.  HEME: Keep Hgb>8g/dL, plt >10k.   - Persistant profound thrombocytopenia. Experiencing improvement on Nplate (started 3/19). Now on max dose 10mcg/kg. plts stable around 90k  -Per Dr Lucio once plts 100k then decrease nplate to every other week and see if plts can be maintained on less frequent dosing. Possibly reaching peak effect of nplate? plts have been 80-90 the last few weeks.    - Previous trial of promacta not helpful. Stopped 12/6/19. Trial of prednisone not helpful, Pred taper complete on 4/16.      3.  ID: afebrile   - FUOs: Extensive ID work up- no etiology identified. Now afebrile   - Given 1 year vaccines 6/4/20, will need boosters in 2 months.     Prophy: ACV, Fluconazole   - influenza vaccination given 11/26/19     4. CV: Hx of steroid induced HTN  - Had been on Lisinopril 10mg daily. Dropped to 5mg daily 6/4 d/t lightheadedness. Symptoms improved. BPs slightly higher the last few weeks. Trial increase in lisinopril to 10mg daily- will stop if lightheadedness returns.      5.  FEN/Renal: Cr and lytes WNL     6.   Mood: Continue Paxil.     7. FUOs - Some kind of auto immune process DDX: sarcoidosis? Underwent EBUS FNA which was negative for granulomas however unlikely you could see this on just fluid aspirate. FUOs resolved with steroids and has not recurred.     RTC 7/7 for next dose nplate, labs and provider visit (will be out of town until then)  - when plts 100k decrease to QOW appts/nplate      Fei Meng PA-C  [phone]    Again, thank you for allowing me to participate in the care of your patient.        Sincerely,        BMT Advanced Practice Provider

## 2020-07-07 ENCOUNTER — ONCOLOGY VISIT (OUTPATIENT)
Dept: TRANSPLANT | Facility: CLINIC | Age: 60
End: 2020-07-07
Attending: INTERNAL MEDICINE
Payer: COMMERCIAL

## 2020-07-07 VITALS
DIASTOLIC BLOOD PRESSURE: 75 MMHG | TEMPERATURE: 98 F | OXYGEN SATURATION: 100 % | SYSTOLIC BLOOD PRESSURE: 117 MMHG | BODY MASS INDEX: 25.45 KG/M2 | HEART RATE: 66 BPM | WEIGHT: 177.4 LBS | RESPIRATION RATE: 16 BRPM

## 2020-07-07 DIAGNOSIS — Z94.81 STATUS POST BONE MARROW TRANSPLANT (H): ICD-10-CM

## 2020-07-07 DIAGNOSIS — D69.6 THROMBOCYTOPENIA (H): Primary | ICD-10-CM

## 2020-07-07 DIAGNOSIS — D69.6 THROMBOCYTOPENIA (H): ICD-10-CM

## 2020-07-07 LAB
ALBUMIN SERPL-MCNC: 4 G/DL (ref 3.4–5)
ALP SERPL-CCNC: 73 U/L (ref 40–150)
ALT SERPL W P-5'-P-CCNC: 23 U/L (ref 0–70)
ANION GAP SERPL CALCULATED.3IONS-SCNC: 4 MMOL/L (ref 3–14)
AST SERPL W P-5'-P-CCNC: 21 U/L (ref 0–45)
BASOPHILS # BLD AUTO: 0 10E9/L (ref 0–0.2)
BASOPHILS NFR BLD AUTO: 0.7 %
BILIRUB SERPL-MCNC: 0.4 MG/DL (ref 0.2–1.3)
BUN SERPL-MCNC: 16 MG/DL (ref 7–30)
CALCIUM SERPL-MCNC: 8.9 MG/DL (ref 8.5–10.1)
CHLORIDE SERPL-SCNC: 108 MMOL/L (ref 94–109)
CO2 SERPL-SCNC: 27 MMOL/L (ref 20–32)
CREAT SERPL-MCNC: 0.87 MG/DL (ref 0.66–1.25)
DIFFERENTIAL METHOD BLD: ABNORMAL
EOSINOPHIL # BLD AUTO: 0.4 10E9/L (ref 0–0.7)
EOSINOPHIL NFR BLD AUTO: 7.9 %
ERYTHROCYTE [DISTWIDTH] IN BLOOD BY AUTOMATED COUNT: 12 % (ref 10–15)
GFR SERPL CREATININE-BSD FRML MDRD: >90 ML/MIN/{1.73_M2}
GLUCOSE SERPL-MCNC: 88 MG/DL (ref 70–99)
HCT VFR BLD AUTO: 36.4 % (ref 40–53)
HGB BLD-MCNC: 12 G/DL (ref 13.3–17.7)
IMM GRANULOCYTES # BLD: 0 10E9/L (ref 0–0.4)
IMM GRANULOCYTES NFR BLD: 0.2 %
LYMPHOCYTES # BLD AUTO: 0.7 10E9/L (ref 0.8–5.3)
LYMPHOCYTES NFR BLD AUTO: 13.7 %
MCH RBC QN AUTO: 35.7 PG (ref 26.5–33)
MCHC RBC AUTO-ENTMCNC: 33 G/DL (ref 31.5–36.5)
MCV RBC AUTO: 108 FL (ref 78–100)
MONOCYTES # BLD AUTO: 0.8 10E9/L (ref 0–1.3)
MONOCYTES NFR BLD AUTO: 14.4 %
NEUTROPHILS # BLD AUTO: 3.4 10E9/L (ref 1.6–8.3)
NEUTROPHILS NFR BLD AUTO: 63.1 %
NRBC # BLD AUTO: 0 10*3/UL
NRBC BLD AUTO-RTO: 0 /100
PLATELET # BLD AUTO: 85 10E9/L (ref 150–450)
POTASSIUM SERPL-SCNC: 4.1 MMOL/L (ref 3.4–5.3)
PROT SERPL-MCNC: 7.4 G/DL (ref 6.8–8.8)
RBC # BLD AUTO: 3.36 10E12/L (ref 4.4–5.9)
SODIUM SERPL-SCNC: 139 MMOL/L (ref 133–144)
WBC # BLD AUTO: 5.3 10E9/L (ref 4–11)

## 2020-07-07 PROCEDURE — 85025 COMPLETE CBC W/AUTO DIFF WBC: CPT | Performed by: STUDENT IN AN ORGANIZED HEALTH CARE EDUCATION/TRAINING PROGRAM

## 2020-07-07 PROCEDURE — G0463 HOSPITAL OUTPT CLINIC VISIT: HCPCS | Mod: ZF

## 2020-07-07 PROCEDURE — 25000128 H RX IP 250 OP 636: Mod: ZF | Performed by: STUDENT IN AN ORGANIZED HEALTH CARE EDUCATION/TRAINING PROGRAM

## 2020-07-07 PROCEDURE — 96372 THER/PROPH/DIAG INJ SC/IM: CPT

## 2020-07-07 PROCEDURE — 36415 COLL VENOUS BLD VENIPUNCTURE: CPT

## 2020-07-07 PROCEDURE — 80053 COMPREHEN METABOLIC PANEL: CPT | Performed by: STUDENT IN AN ORGANIZED HEALTH CARE EDUCATION/TRAINING PROGRAM

## 2020-07-07 RX ADMIN — ROMIPLOSTIM 750 MCG: 500 INJECTION, POWDER, LYOPHILIZED, FOR SOLUTION SUBCUTANEOUS at 12:29

## 2020-07-07 ASSESSMENT — PAIN SCALES - GENERAL: PAINLEVEL: NO PAIN (0)

## 2020-07-07 NOTE — PROGRESS NOTES
BMT Clinic Progress Note    Patient ID: Angel Yanez is a 58 yo man 1 year and 1 mo s/p 2nd autologous transplant for MM.     Interval history:     Guille returns for follow up. Trip to [place] went well. No issues, SOB or bleeding despite elevation and a total of 40 miles walked/hiked over trip. No n/v/d. No fevers, chills, cough or chest discomfort.    ROS: 8 point ROS neg unless specified above.       Physical Exam:   Blood pressure 117/75, pulse 66, temperature 98  F (36.7  C), temperature source Temporal, resp. rate 16, weight 80.5 kg (177 lb 6.4 oz), SpO2 100 %.    Wt Readings from Last 4 Encounters:   07/07/20 80.5 kg (177 lb 6.4 oz)   06/25/20 80.6 kg (177 lb 9.6 oz)   06/18/20 80.2 kg (176 lb 11.2 oz)   06/11/20 80.5 kg (177 lb 6.4 oz)     General: NAD, engaged  HEENT: normocephalic, atraumatic, OP non erythematous, no petechiae or lesions  Lungs: CTAB  Cardiovascular: RRR, S1, S2, no m/r/g  GI/Abdomen: +BS, soft, nontender, non-distended  Skin: No rashes or petechaie.    MSK/Extremities: Warm, well perfused. No edema  Neurologic: alert, and conversant  Access: peripheral     Data:     Lab Results   Component Value Date    WBC 5.3 07/07/2020    ANEU 3.4 07/07/2020    HGB 12.0 (L) 07/07/2020    HCT 36.4 (L) 07/07/2020    PLT 85 (L) 07/07/2020     07/07/2020    POTASSIUM 4.1 07/07/2020    CHLORIDE 108 07/07/2020    CO2 27 07/07/2020    GLC 88 07/07/2020    BUN 16 07/07/2020    CR 0.87 07/07/2020    MAG 2.2 05/19/2020    INR 1.03 04/30/2020    BILITOTAL 0.4 07/07/2020    AST 21 07/07/2020    ALT 23 07/07/2020    ALKPHOS 73 07/07/2020    PROTTOTAL 7.4 07/07/2020    ALBUMIN 4.0 07/07/2020       Assessment and Plan:     Angel Yanez is a 58 yo man 1 year and 1 mo s/p 2nd autologous transplant for MM with ongoing cytopenias.     1.  BMT/MM:   Slow engraftment; cell dose was only 0.639x10^6. (Marrow [place] - known poor cell dose as failed chemo-mobilization 1/2019.)   - day +180 bone marrow biopsy  11/6/19 which showed no morphologic or immunophenotypic evidence of plasma cell neoplasm. His light chains were not elevated and he had no monoclonal protein in the serum.  He is in clinical remission.  - PET in 8/2019 clear.   - 1 year bmbx See Naveed's complete note: no monoclonal protein (his disease was originally IgA kappa) and other chemistry studies showed no signs of residual disease. An x-ray bone survey shows no change from 1 year earlier and a PET CT scan shows no hypermetabolic lesions except the right anterior third rib... It does not look to be a myeloma lesion but rather a subacute rib fracture with hypermetabolic signs of healing. Myeloma in remission, however marrow is hypocellular.    2.  HEME: Keep Hgb>8g/dL, plt >10k.   - Persistant profound thrombocytopenia. Experiencing improvement on Nplate (started 3/19). Cont on max dose 10mcg/kg.  - Per Dr Lucio once plts 100k then decrease nplate to every other week and see if plts can be maintained on less frequent dosing. Possibly reaching peak effect of nplate? plts have been 80-90 the last few weeks.    - Previous trial of promacta not helpful. Stopped 12/6/19. Trial of prednisone not helpful, Pred taper complete on 4/16.      3.  ID: afebrile   - FUOs: Extensive ID work up- no etiology identified. Now afebrile   - Given 1 year vaccines 6/4/20, will need boosters i8/2020     Prophy: ACV, Fluconazole   - influenza vaccination given 11/26/19     4. CV: Hx of steroid induced HTN  - Had been on Lisinopril 10mg daily. Dropped to 5mg daily 6/4 d/t lightheadedness. Symptoms improved. BPs slightly higher the last few weeks. Trial increase in lisinopril to 10mg daily- will stop if lightheadedness returns.      5.  FEN/Renal: Cr and lytes WNL     6.  Mood: Continue Paxil.     7. FUOs - Some kind of auto immune process DDX: sarcoidosis? Underwent EBUS FNA which was negative for granulomas however unlikely you could see this on just fluid aspirate. FUOs  resolved with steroids and has not recurred.       RTC 7/14 for next dose nplate, labs and provider visit   When plts =/>100k decrease to QOW appts/nplate  Booster vaccines in early August    Mony Pitts PAC  [phone]

## 2020-07-07 NOTE — LETTER
7/7/2020         RE: Angel Yanez  [address]  [facility] [address]        Dear Colleague,    Thank you for referring your patient, Angel Yanez, to the [facility] BLOOD AND MARROW TRANSPLANT. Please see a copy of my visit note below.    Chief Complaint   Patient presents with     Blood Draw     Vitals and blood drawn by LPN. Pt checked into appt.      DEBBIE Hernandez LPN    BMT Clinic Progress Note    Patient ID: Angel Yanez is a 60 yo man 1 year and 1 mo s/p 2nd autologous transplant for MM.     Interval history:     Guille returns for follow up. Trip to [place] went well. No issues, SOB or bleeding despite elevation and a total of 40 miles walked/hiked over trip. No n/v/d. No fevers, chills, cough or chest discomfort.    ROS: 8 point ROS neg unless specified above.       Physical Exam:   Blood pressure 117/75, pulse 66, temperature 98  F (36.7  C), temperature source Temporal, resp. rate 16, weight 80.5 kg (177 lb 6.4 oz), SpO2 100 %.    Wt Readings from Last 4 Encounters:   07/07/20 80.5 kg (177 lb 6.4 oz)   06/25/20 80.6 kg (177 lb 9.6 oz)   06/18/20 80.2 kg (176 lb 11.2 oz)   06/11/20 80.5 kg (177 lb 6.4 oz)     General: NAD, engaged  HEENT: normocephalic, atraumatic, OP non erythematous, no petechiae or lesions  Lungs: CTAB  Cardiovascular: RRR, S1, S2, no m/r/g  GI/Abdomen: +BS, soft, nontender, non-distended  Skin: No rashes or petechaie.    MSK/Extremities: Warm, well perfused. No edema  Neurologic: alert, and conversant  Access: peripheral     Data:     Lab Results   Component Value Date    WBC 5.3 07/07/2020    ANEU 3.4 07/07/2020    HGB 12.0 (L) 07/07/2020    HCT 36.4 (L) 07/07/2020    PLT 85 (L) 07/07/2020     07/07/2020    POTASSIUM 4.1 07/07/2020    CHLORIDE 108 07/07/2020    CO2 27 07/07/2020    GLC 88 07/07/2020    BUN 16 07/07/2020    CR 0.87 07/07/2020    MAG 2.2 05/19/2020    INR 1.03 04/30/2020    BILITOTAL 0.4 07/07/2020    AST 21 07/07/2020    ALT 23 07/07/2020     ALKPHOS 73 07/07/2020    PROTTOTAL 7.4 07/07/2020    ALBUMIN 4.0 07/07/2020       Assessment and Plan:     Angel Yanez is a 60 yo man 1 year and 1 mo s/p 2nd autologous transplant for MM with ongoing cytopenias.     1.  BMT/MM:   Slow engraftment; cell dose was only 0.639x10^6. (Marrow [place] - known poor cell dose as failed chemo-mobilization 1/2019.)   - day +180 bone marrow biopsy 11/6/19 which showed no morphologic or immunophenotypic evidence of plasma cell neoplasm. His light chains were not elevated and he had no monoclonal protein in the serum.  He is in clinical remission.  - PET in 8/2019 clear.   - 1 year bmbx See Naveed's complete note: no monoclonal protein (his disease was originally IgA kappa) and other chemistry studies showed no signs of residual disease. An x-ray bone survey shows no change from 1 year earlier and a PET CT scan shows no hypermetabolic lesions except the right anterior third rib... It does not look to be a myeloma lesion but rather a subacute rib fracture with hypermetabolic signs of healing. Myeloma in remission, however marrow is hypocellular.    2.  HEME: Keep Hgb>8g/dL, plt >10k.   - Persistant profound thrombocytopenia. Experiencing improvement on Nplate (started 3/19). Cont on max dose 10mcg/kg.  - Per Dr Lucio once plts 100k then decrease nplate to every other week and see if plts can be maintained on less frequent dosing. Possibly reaching peak effect of nplate? plts have been 80-90 the last few weeks.    - Previous trial of promacta not helpful. Stopped 12/6/19. Trial of prednisone not helpful, Pred taper complete on 4/16.      3.  ID: afebrile   - FUOs: Extensive ID work up- no etiology identified. Now afebrile   - Given 1 year vaccines 6/4/20, will need boosters i8/2020     Prophy: ACV, Fluconazole   - influenza vaccination given 11/26/19     4. CV: Hx of steroid induced HTN  - Had been on Lisinopril 10mg daily. Dropped to 5mg daily 6/4 d/t lightheadedness.  Symptoms improved. BPs slightly higher the last few weeks. Trial increase in lisinopril to 10mg daily- will stop if lightheadedness returns.      5.  FEN/Renal: Cr and lytes WNL     6.  Mood: Continue Paxil.     7. FUOs - Some kind of auto immune process DDX: sarcoidosis? Underwent EBUS FNA which was negative for granulomas however unlikely you could see this on just fluid aspirate. FUOs resolved with steroids and has not recurred.       RTC 7/14 for next dose nplate, labs and provider visit   When plts =/>100k decrease to QOW appts/nplate  Booster vaccines in early August    Mony Pitts PAC  [phone]      Again, thank you for allowing me to participate in the care of your patient.        Sincerely,        BMT Advanced Practice Provider

## 2020-07-07 NOTE — NURSING NOTE
"Oncology Rooming Note    July 7, 2020 11:33 AM   Angel Yanez is a 60 year old male who presents for:    Chief Complaint   Patient presents with     Blood Draw     Vitals and blood drawn by LPN. Pt checked into appt.      RECHECK      Multiple myeloma not having achieved remission (H)     Initial Vitals: /75   Pulse 66   Temp 98  F (36.7  C) (Temporal)   Resp 16   Wt 80.5 kg (177 lb 6.4 oz)   SpO2 100%   BMI 25.45 kg/m   Estimated body mass index is 25.45 kg/m  as calculated from the following:    Height as of 5/21/20: 1.778 m (5' 10\").    Weight as of this encounter: 80.5 kg (177 lb 6.4 oz). Body surface area is 1.99 meters squared.  No Pain (0) Comment: Data Unavailable   No LMP for male patient.  Allergies reviewed: Yes  Medications reviewed: Yes    Medications: Medication refills not needed today.  Pharmacy name entered into EPIC:    [facility] - [place] - [address] [facility]ARMACY [facility] [place], [place] - [address] [facility] [address]    Clinical concerns: JUAN MANUEL Guthrie CMA              "

## 2020-07-14 ENCOUNTER — ONCOLOGY VISIT (OUTPATIENT)
Dept: TRANSPLANT | Facility: CLINIC | Age: 60
End: 2020-07-14
Attending: INTERNAL MEDICINE
Payer: COMMERCIAL

## 2020-07-14 ENCOUNTER — APPOINTMENT (OUTPATIENT)
Dept: LAB | Facility: CLINIC | Age: 60
End: 2020-07-14
Attending: INTERNAL MEDICINE
Payer: COMMERCIAL

## 2020-07-14 VITALS
SYSTOLIC BLOOD PRESSURE: 121 MMHG | WEIGHT: 176.1 LBS | OXYGEN SATURATION: 97 % | DIASTOLIC BLOOD PRESSURE: 76 MMHG | RESPIRATION RATE: 18 BRPM | BODY MASS INDEX: 25.27 KG/M2 | HEART RATE: 69 BPM | TEMPERATURE: 97.4 F

## 2020-07-14 DIAGNOSIS — Z94.81 STATUS POST BONE MARROW TRANSPLANT (H): ICD-10-CM

## 2020-07-14 DIAGNOSIS — D69.6 THROMBOCYTOPENIA (H): Primary | ICD-10-CM

## 2020-07-14 LAB
ALBUMIN SERPL-MCNC: 4 G/DL (ref 3.4–5)
ALP SERPL-CCNC: 79 U/L (ref 40–150)
ALT SERPL W P-5'-P-CCNC: 23 U/L (ref 0–70)
ANION GAP SERPL CALCULATED.3IONS-SCNC: 5 MMOL/L (ref 3–14)
AST SERPL W P-5'-P-CCNC: 17 U/L (ref 0–45)
BASOPHILS # BLD AUTO: 0 10E9/L (ref 0–0.2)
BASOPHILS NFR BLD AUTO: 0.7 %
BILIRUB SERPL-MCNC: 0.5 MG/DL (ref 0.2–1.3)
BUN SERPL-MCNC: 20 MG/DL (ref 7–30)
CALCIUM SERPL-MCNC: 9.4 MG/DL (ref 8.5–10.1)
CHLORIDE SERPL-SCNC: 102 MMOL/L (ref 94–109)
CO2 SERPL-SCNC: 28 MMOL/L (ref 20–32)
CREAT SERPL-MCNC: 0.94 MG/DL (ref 0.66–1.25)
DIFFERENTIAL METHOD BLD: ABNORMAL
EOSINOPHIL # BLD AUTO: 0.4 10E9/L (ref 0–0.7)
EOSINOPHIL NFR BLD AUTO: 6.5 %
ERYTHROCYTE [DISTWIDTH] IN BLOOD BY AUTOMATED COUNT: 11.8 % (ref 10–15)
GFR SERPL CREATININE-BSD FRML MDRD: 86 ML/MIN/{1.73_M2}
GLUCOSE SERPL-MCNC: 87 MG/DL (ref 70–99)
HCT VFR BLD AUTO: 36.4 % (ref 40–53)
HGB BLD-MCNC: 11.9 G/DL (ref 13.3–17.7)
IMM GRANULOCYTES # BLD: 0 10E9/L (ref 0–0.4)
IMM GRANULOCYTES NFR BLD: 0.2 %
LYMPHOCYTES # BLD AUTO: 0.8 10E9/L (ref 0.8–5.3)
LYMPHOCYTES NFR BLD AUTO: 14.4 %
MCH RBC QN AUTO: 35.2 PG (ref 26.5–33)
MCHC RBC AUTO-ENTMCNC: 32.7 G/DL (ref 31.5–36.5)
MCV RBC AUTO: 108 FL (ref 78–100)
MONOCYTES # BLD AUTO: 0.8 10E9/L (ref 0–1.3)
MONOCYTES NFR BLD AUTO: 13.9 %
NEUTROPHILS # BLD AUTO: 3.5 10E9/L (ref 1.6–8.3)
NEUTROPHILS NFR BLD AUTO: 64.3 %
NRBC # BLD AUTO: 0 10*3/UL
NRBC BLD AUTO-RTO: 0 /100
PLATELET # BLD AUTO: 74 10E9/L (ref 150–450)
POTASSIUM SERPL-SCNC: 4.8 MMOL/L (ref 3.4–5.3)
PROT SERPL-MCNC: 7.8 G/DL (ref 6.8–8.8)
RBC # BLD AUTO: 3.38 10E12/L (ref 4.4–5.9)
SODIUM SERPL-SCNC: 135 MMOL/L (ref 133–144)
WBC # BLD AUTO: 5.4 10E9/L (ref 4–11)

## 2020-07-14 PROCEDURE — 96372 THER/PROPH/DIAG INJ SC/IM: CPT

## 2020-07-14 PROCEDURE — 25000128 H RX IP 250 OP 636: Mod: ZF | Performed by: STUDENT IN AN ORGANIZED HEALTH CARE EDUCATION/TRAINING PROGRAM

## 2020-07-14 PROCEDURE — G0463 HOSPITAL OUTPT CLINIC VISIT: HCPCS | Mod: ZF

## 2020-07-14 PROCEDURE — 80053 COMPREHEN METABOLIC PANEL: CPT | Performed by: STUDENT IN AN ORGANIZED HEALTH CARE EDUCATION/TRAINING PROGRAM

## 2020-07-14 PROCEDURE — 85025 COMPLETE CBC W/AUTO DIFF WBC: CPT | Performed by: STUDENT IN AN ORGANIZED HEALTH CARE EDUCATION/TRAINING PROGRAM

## 2020-07-14 PROCEDURE — 36415 COLL VENOUS BLD VENIPUNCTURE: CPT

## 2020-07-14 RX ADMIN — ROMIPLOSTIM 750 MCG: 500 INJECTION, POWDER, LYOPHILIZED, FOR SOLUTION SUBCUTANEOUS at 12:40

## 2020-07-14 ASSESSMENT — PAIN SCALES - GENERAL: PAINLEVEL: NO PAIN (0)

## 2020-07-14 NOTE — NURSING NOTE
"Oncology Rooming Note    July 14, 2020 11:21 AM   Angel Yanez is a 60 year old male who presents for:    Chief Complaint   Patient presents with     Blood Draw     VPT blood draw and vitals     RECHECK     Multiple Myeloma      Initial Vitals: /76   Pulse 69   Temp 97.4  F (36.3  C) (Oral)   Resp 18   Wt 79.9 kg (176 lb 1.6 oz)   SpO2 97%   BMI 25.27 kg/m   Estimated body mass index is 25.27 kg/m  as calculated from the following:    Height as of 5/21/20: 1.778 m (5' 10\").    Weight as of this encounter: 79.9 kg (176 lb 1.6 oz). Body surface area is 1.99 meters squared.  No Pain (0) Comment: Data Unavailable   No LMP for male patient.  Allergies reviewed: Yes  Medications reviewed: Yes    Medications: Medication refills not needed today.  Pharmacy name entered into EPIC:    [facility] [place] - [address] [facility] - [place], [place] - [address]    Clinical concerns: None       Kathy Perez CMA              "

## 2020-07-14 NOTE — PROGRESS NOTES
BMT Clinic Progress Note    Patient ID: Angel Yanez is a 60 yo man 1 year and 1 mo s/p 2nd autologous transplant for MM.     Interval history:     Guille returns for follow up. No issues with bleeding. Bikes frequently, no falls or new bruises. Feeling well without n/v/d, fevers, cough or cold symptoms.    ROS: 8 point ROS neg unless specified above.       Physical Exam:   Blood pressure 121/76, pulse 69, temperature 97.4  F (36.3  C), temperature source Oral, resp. rate 18, weight 79.9 kg (176 lb 1.6 oz), SpO2 97 %.    Wt Readings from Last 4 Encounters:   07/14/20 79.9 kg (176 lb 1.6 oz)   07/07/20 80.5 kg (177 lb 6.4 oz)   06/25/20 80.6 kg (177 lb 9.6 oz)   06/18/20 80.2 kg (176 lb 11.2 oz)     General: NAD, engaged  HEENT: normocephalic, atraumatic, OP non erythematous, no petechiae or lesions  Lungs: CTAB  Cardiovascular: RRR, S1, S2, no m/r/g  GI/Abdomen: +BS, soft, nontender, non-distended  Skin: No rashes or petechaie.    MSK/Extremities: Warm, well perfused. No edema  Neurologic: alert, and conversant  Access: peripheral     Data:     Lab Results   Component Value Date    WBC 5.4 07/14/2020    ANEU 3.5 07/14/2020    HGB 11.9 (L) 07/14/2020    HCT 36.4 (L) 07/14/2020    PLT 74 (L) 07/14/2020     07/14/2020    POTASSIUM 4.8 07/14/2020    CHLORIDE 102 07/14/2020    CO2 28 07/14/2020    GLC 87 07/14/2020    BUN 20 07/14/2020    CR 0.94 07/14/2020    MAG 2.2 05/19/2020    INR 1.03 04/30/2020    BILITOTAL 0.5 07/14/2020    AST 17 07/14/2020    ALT 23 07/14/2020    ALKPHOS 79 07/14/2020    PROTTOTAL 7.8 07/14/2020    ALBUMIN 4.0 07/14/2020       Assessment and Plan:     Angel Yanez is a 60 yo man 1 year and 1 mo s/p 2nd autologous transplant for MM with ongoing cytopenias.     1.  BMT/MM:   Slow engraftment; cell dose was only 0.639x10^6. (Marrow [place] - known poor cell dose as failed chemo-mobilization 1/2019.)   - day +180 bone marrow biopsy 11/6/19 which showed no morphologic or immunophenotypic  evidence of plasma cell neoplasm. His light chains were not elevated and he had no monoclonal protein in the serum.  He is in clinical remission.  - PET in 8/2019 clear.   - 1 year bmbx See Naveed's complete note: no monoclonal protein (his disease was originally IgA kappa) and other chemistry studies showed no signs of residual disease. An x-ray bone survey shows no change from 1 year earlier and a PET CT scan shows no hypermetabolic lesions except the right anterior third rib... It does not look to be a myeloma lesion but rather a subacute rib fracture with hypermetabolic signs of healing. Myeloma in remission, however marrow is hypocellular.    2.  HEME: Keep Hgb>8g/dL, plt >10k.   - Persistant profound thrombocytopenia. Experienced improvement on Nplate (started 3/19). However June came to plateau and now trending down. Cont on max dose 10mcg/kg.  - Per Dr Lucio once plts 100k then decrease nplate to every other week and see if plts can be maintained on less frequent dosing.   - Previous trial of promacta not helpful. Stopped 12/6/19. Trial of prednisone not helpful, Pred taper complete on 4/16.      3.  ID: afebrile   - FUOs: Extensive ID work up- no etiology identified. Now afebrile   - Given 1 year vaccines 6/4/20, will need boosters i8/2020     Prophy: ACV, Fluconazole   - influenza vaccination given 11/26/19     4. CV: Hx of steroid induced HTN  - Had been on Lisinopril 10mg daily. Dropped to 5mg daily 6/4 d/t lightheadedness. Symptoms improved. BPs slightly higher, increased lisinopril to 10mg daily, no issues with lightheadedness and BP at goal     5.  FEN/Renal: Cr and lytes WNL     6.  Mood: Continue Paxil.     7. FUOs - Some kind of auto immune process DDX: sarcoidosis? Underwent EBUS FNA which was negative for granulomas however unlikely you could see this on just fluid aspirate. FUOs resolved with steroids and has not recurred.       RTC 7/21 for next dose nplate, labs and provider visit    Radhad Maria A regarding plan with on nplate with falling plts   When plts =/>100k decrease to QOW appts/nplate  Booster vaccines in early August    Mony Pitts PAC  [phone]

## 2020-07-14 NOTE — LETTER
7/14/2020         RE: Angel Yanez  [address]  [facility] [address]        Dear Colleague,    Thank you for referring your patient, Angel Yanez, to the [facility]ARROW [facility]. Please see a copy of my visit note below.    Chief Complaint   Patient presents with     Blood Draw     VPT blood draw and vitals     Venipuncture labs drawn from left arm       BMT Clinic Progress Note    Patient ID: Angel Yanez is a 58 yo man 1 year and 1 mo s/p 2nd autologous transplant for MM.     Interval history:     Guille returns for follow up. No issues with bleeding. Bikes frequently, no falls or new bruises. Feeling well without n/v/d, fevers, cough or cold symptoms.    ROS: 8 point ROS neg unless specified above.       Physical Exam:   Blood pressure 121/76, pulse 69, temperature 97.4  F (36.3  C), temperature source Oral, resp. rate 18, weight 79.9 kg (176 lb 1.6 oz), SpO2 97 %.    Wt Readings from Last 4 Encounters:   07/14/20 79.9 kg (176 lb 1.6 oz)   07/07/20 80.5 kg (177 lb 6.4 oz)   06/25/20 80.6 kg (177 lb 9.6 oz)   06/18/20 80.2 kg (176 lb 11.2 oz)     General: NAD, engaged  HEENT: normocephalic, atraumatic, OP non erythematous, no petechiae or lesions  Lungs: CTAB  Cardiovascular: RRR, S1, S2, no m/r/g  GI/Abdomen: +BS, soft, nontender, non-distended  Skin: No rashes or petechaie.    MSK/Extremities: Warm, well perfused. No edema  Neurologic: alert, and conversant  Access: peripheral     Data:     Lab Results   Component Value Date    WBC 5.4 07/14/2020    ANEU 3.5 07/14/2020    HGB 11.9 (L) 07/14/2020    HCT 36.4 (L) 07/14/2020    PLT 74 (L) 07/14/2020     07/14/2020    POTASSIUM 4.8 07/14/2020    CHLORIDE 102 07/14/2020    CO2 28 07/14/2020    GLC 87 07/14/2020    BUN 20 07/14/2020    CR 0.94 07/14/2020    MAG 2.2 05/19/2020    INR 1.03 04/30/2020    BILITOTAL 0.5 07/14/2020    AST 17 07/14/2020    ALT 23 07/14/2020    ALKPHOS 79 07/14/2020    PROTTOTAL 7.8 07/14/2020     ALBUMIN 4.0 07/14/2020       Assessment and Plan:     Angel Yanez is a 60 yo man 1 year and 1 mo s/p 2nd autologous transplant for MM with ongoing cytopenias.     1.  BMT/MM:   Slow engraftment; cell dose was only 0.639x10^6. ([facility] [place] - known poor cell dose as failed chemo-mobilization 1/2019.)   - day +180 bone marrow biopsy 11/6/19 which showed no morphologic or immunophenotypic evidence of plasma cell neoplasm. His light chains were not elevated and he had no monoclonal protein in the serum.  He is in clinical remission.  - PET in 8/2019 clear.   - 1 year bmbx See Naveed's complete note: no monoclonal protein (his disease was originally IgA kappa) and other chemistry studies showed no signs of residual disease. An x-ray bone survey shows no change from 1 year earlier and a PET CT scan shows no hypermetabolic lesions except the right anterior third rib... It does not look to be a myeloma lesion but rather a subacute rib fracture with hypermetabolic signs of healing. Myeloma in remission, however marrow is hypocellular.    2.  HEME: Keep Hgb>8g/dL, plt >10k.   - Persistant profound thrombocytopenia. Experienced improvement on Nplate (started 3/19). However June came to plateau and now trending down. Cont on max dose 10mcg/kg.  - Per Dr Lucio once plts 100k then decrease nplate to every other week and see if plts can be maintained on less frequent dosing.   - Previous trial of promacta not helpful. Stopped 12/6/19. Trial of prednisone not helpful, Pred taper complete on 4/16.      3.  ID: afebrile   - FUOs: Extensive ID work up- no etiology identified. Now afebrile   - Given 1 year vaccines 6/4/20, will need boosters i8/2020     Prophy: ACV, Fluconazole   - influenza vaccination given 11/26/19     4. CV: Hx of steroid induced HTN  - Had been on Lisinopril 10mg daily. Dropped to 5mg daily 6/4 d/t lightheadedness. Symptoms improved. BPs slightly higher, increased lisinopril to 10mg daily, no issues  with lightheadedness and BP at goal     5.  FEN/Renal: Cr and lytes WNL     6.  Mood: Continue Paxil.     7. FUOs - Some kind of auto immune process DDX: sarcoidosis? Underwent EBUS FNA which was negative for granulomas however unlikely you could see this on just fluid aspirate. FUOs resolved with steroids and has not recurred.       RTC 7/21 for next dose nplate, labs and provider visit   Messaged Billyi regarding plan with on nplate with falling plts   When plts =/>100k decrease to QOW appts/nplate  Booster vaccines in early August    Mony Pitts PAC  [phone]      Again, thank you for allowing me to participate in the care of your patient.        Sincerely,        BMT Advanced Practice Provider

## 2020-07-21 ENCOUNTER — APPOINTMENT (OUTPATIENT)
Dept: LAB | Facility: CLINIC | Age: 60
End: 2020-07-21
Attending: INTERNAL MEDICINE
Payer: COMMERCIAL

## 2020-07-21 ENCOUNTER — ONCOLOGY VISIT (OUTPATIENT)
Dept: TRANSPLANT | Facility: CLINIC | Age: 60
End: 2020-07-21
Attending: INTERNAL MEDICINE
Payer: COMMERCIAL

## 2020-07-21 VITALS
HEART RATE: 76 BPM | SYSTOLIC BLOOD PRESSURE: 129 MMHG | BODY MASS INDEX: 25.25 KG/M2 | TEMPERATURE: 97.5 F | DIASTOLIC BLOOD PRESSURE: 87 MMHG | OXYGEN SATURATION: 99 % | WEIGHT: 176.4 LBS | HEIGHT: 70 IN | RESPIRATION RATE: 18 BRPM

## 2020-07-21 DIAGNOSIS — D69.6 THROMBOCYTOPENIA (H): ICD-10-CM

## 2020-07-21 LAB
ALBUMIN SERPL-MCNC: 4 G/DL (ref 3.4–5)
ALP SERPL-CCNC: 82 U/L (ref 40–150)
ALT SERPL W P-5'-P-CCNC: 22 U/L (ref 0–70)
ANION GAP SERPL CALCULATED.3IONS-SCNC: 5 MMOL/L (ref 3–14)
AST SERPL W P-5'-P-CCNC: 14 U/L (ref 0–45)
BASOPHILS # BLD AUTO: 0.1 10E9/L (ref 0–0.2)
BASOPHILS NFR BLD AUTO: 1.1 %
BILIRUB SERPL-MCNC: 0.4 MG/DL (ref 0.2–1.3)
BUN SERPL-MCNC: 21 MG/DL (ref 7–30)
CALCIUM SERPL-MCNC: 9.2 MG/DL (ref 8.5–10.1)
CHLORIDE SERPL-SCNC: 101 MMOL/L (ref 94–109)
CO2 SERPL-SCNC: 26 MMOL/L (ref 20–32)
CREAT SERPL-MCNC: 0.93 MG/DL (ref 0.66–1.25)
DIFFERENTIAL METHOD BLD: ABNORMAL
EOSINOPHIL # BLD AUTO: 0.6 10E9/L (ref 0–0.7)
EOSINOPHIL NFR BLD AUTO: 10.8 %
ERYTHROCYTE [DISTWIDTH] IN BLOOD BY AUTOMATED COUNT: 11.9 % (ref 10–15)
GFR SERPL CREATININE-BSD FRML MDRD: 87 ML/MIN/{1.73_M2}
GLUCOSE SERPL-MCNC: 86 MG/DL (ref 70–99)
HCT VFR BLD AUTO: 35.7 % (ref 40–53)
HGB BLD-MCNC: 11.7 G/DL (ref 13.3–17.7)
IMM GRANULOCYTES # BLD: 0 10E9/L (ref 0–0.4)
IMM GRANULOCYTES NFR BLD: 0.2 %
LYMPHOCYTES # BLD AUTO: 1 10E9/L (ref 0.8–5.3)
LYMPHOCYTES NFR BLD AUTO: 18.1 %
MCH RBC QN AUTO: 35.3 PG (ref 26.5–33)
MCHC RBC AUTO-ENTMCNC: 32.8 G/DL (ref 31.5–36.5)
MCV RBC AUTO: 108 FL (ref 78–100)
MONOCYTES # BLD AUTO: 0.8 10E9/L (ref 0–1.3)
MONOCYTES NFR BLD AUTO: 14.5 %
NEUTROPHILS # BLD AUTO: 3 10E9/L (ref 1.6–8.3)
NEUTROPHILS NFR BLD AUTO: 55.3 %
NRBC # BLD AUTO: 0 10*3/UL
NRBC BLD AUTO-RTO: 0 /100
PLATELET # BLD AUTO: 75 10E9/L (ref 150–450)
POTASSIUM SERPL-SCNC: 4.2 MMOL/L (ref 3.4–5.3)
PROT SERPL-MCNC: 8.1 G/DL (ref 6.8–8.8)
RBC # BLD AUTO: 3.31 10E12/L (ref 4.4–5.9)
SODIUM SERPL-SCNC: 132 MMOL/L (ref 133–144)
WBC # BLD AUTO: 5.4 10E9/L (ref 4–11)

## 2020-07-21 PROCEDURE — 80053 COMPREHEN METABOLIC PANEL: CPT | Performed by: STUDENT IN AN ORGANIZED HEALTH CARE EDUCATION/TRAINING PROGRAM

## 2020-07-21 PROCEDURE — 85025 COMPLETE CBC W/AUTO DIFF WBC: CPT | Performed by: STUDENT IN AN ORGANIZED HEALTH CARE EDUCATION/TRAINING PROGRAM

## 2020-07-21 PROCEDURE — 36415 COLL VENOUS BLD VENIPUNCTURE: CPT

## 2020-07-21 PROCEDURE — G0463 HOSPITAL OUTPT CLINIC VISIT: HCPCS | Mod: ZF

## 2020-07-21 ASSESSMENT — PAIN SCALES - GENERAL: PAINLEVEL: NO PAIN (0)

## 2020-07-21 ASSESSMENT — MIFFLIN-ST. JEOR: SCORE: 1606.4

## 2020-07-21 NOTE — LETTER
"    7/21/2020         RE: Angel Yanez  [address]  [facility] [address]        Dear Colleague,    Thank you for referring your patient, Angel Yanez, to the [facility] MARR[facility]. Please see a copy of my visit note below.    BMT Clinic Progress Note    Patient ID: Angel Yanez is a 58 yo man 1 year and 1 mo s/p 2nd autologous transplant for MM.     Interval history:     Guille returns for follow up. Only complaint is itching skin. No rashes. Hasn't tried benadryl or lotions. No new detergents. No bleeding. No fevers or SOB. Biking and working on his deck.     ROS: 8 point ROS neg unless specified above.       Physical Exam:   Blood pressure 129/87, pulse 76, temperature 97.5  F (36.4  C), temperature source Oral, resp. rate 18, height 1.778 m (5' 10\"), weight 80 kg (176 lb 6.4 oz), SpO2 99 %.    Wt Readings from Last 4 Encounters:   07/21/20 80 kg (176 lb 6.4 oz)   07/14/20 79.9 kg (176 lb 1.6 oz)   07/07/20 80.5 kg (177 lb 6.4 oz)   06/25/20 80.6 kg (177 lb 9.6 oz)     General: NAD, engaged  HEENT: normocephalic, atraumatic, OP non erythematous, no petechiae or lesions  Lungs: CTAB  Cardiovascular: RRR, S1, S2, no m/r/g  GI/Abdomen: +BS, soft, nontender, non-distended  Skin: No rashes or petechaie.    MSK/Extremities: Warm, well perfused. No edema  Neurologic: alert, and conversant  Access: peripheral     Data:     Lab Results   Component Value Date    WBC 5.4 07/21/2020    ANEU 3.0 07/21/2020    HGB 11.7 (L) 07/21/2020    HCT 35.7 (L) 07/21/2020    PLT 75 (L) 07/21/2020     (L) 07/21/2020    POTASSIUM 4.2 07/21/2020    CHLORIDE 101 07/21/2020    CO2 26 07/21/2020    GLC 86 07/21/2020    BUN 21 07/21/2020    CR 0.93 07/21/2020    MAG 2.2 05/19/2020    INR 1.03 04/30/2020    BILITOTAL 0.4 07/21/2020    AST 14 07/21/2020    ALT 22 07/21/2020    ALKPHOS 82 07/21/2020    PROTTOTAL 8.1 07/21/2020    ALBUMIN 4.0 07/21/2020       Assessment and Plan:     Angel Yanez is a 58 yo " man 1 year and 1 mo s/p 2nd autologous transplant for MM with ongoing cytopenias.     1.  BMT/MM:   Slow engraftment; cell dose was only 0.639x10^6. ([facility] [place] - known poor cell dose as failed chemo-mobilization 1/2019.)   - day +180 bone marrow biopsy 11/6/19 which showed no morphologic or immunophenotypic evidence of plasma cell neoplasm. His light chains were not elevated and he had no monoclonal protein in the serum.  He is in clinical remission.  - PET in 8/2019 clear.   - 1 year bmbx See Naveed's complete note: no monoclonal protein (his disease was originally IgA kappa) and other chemistry studies showed no signs of residual disease. An x-ray bone survey shows no change from 1 year earlier and a PET CT scan shows no hypermetabolic lesions except the right anterior third rib... It does not look to be a myeloma lesion but rather a subacute rib fracture with hypermetabolic signs of healing. Myeloma in remission, however marrow is hypocellular.    2.  HEME: Keep Hgb>8g/dL, plt >10k.   - Persistant profound thrombocytopenia. Experienced improvement on Nplate (started 3/19). However June came to plateau and now trending down. Cont on max dose 10mcg/kg.  - Per Dr Lucio once plts 100k then decrease nplate to every other week and see if plts can be maintained on less frequent dosing. Plts stable at 75k (last dose of NPLATE 7/14. Will NOT give today and see where plts are at next week.   - Previous trial of promacta not helpful. Stopped 12/6/19. Trial of prednisone not helpful, Pred taper complete on 4/16.      3.  ID: afebrile   - FUOs: Extensive ID work up- no etiology identified. Now afebrile   - Given 1 year vaccines 6/4/20, will need boosters i8/2020     Prophy: ACV, Fluconazole   - influenza vaccination given 11/26/19     4. CV: Hx of steroid induced HTN  - Had been on Lisinopril 10mg daily. Dropped to 5mg daily 6/4 d/t lightheadedness. Symptoms improved. BPs slightly higher, increased lisinopril to  10mg daily, no issues with lightheadedness and BP at goal     5.  FEN/Renal: Cr and lytes WNL     6.  Mood: Continue Paxil.     7. FUOs - Some kind of auto immune process DDX: sarcoidosis? Underwent EBUS FNA which was negative for granulomas however unlikely you could see this on just fluid aspirate. FUOs resolved with steroids and has not recurred.       RTC 7/28 for next dose nplate, labs and provider visit   When plts =/>100k decrease to QOW appts/nplate  Booster vaccines in early August    Mariana Jolly NP        Again, thank you for allowing me to participate in the care of your patient.        Sincerely,        BMT Advanced Practice Provider

## 2020-07-21 NOTE — PROGRESS NOTES
"BMT Clinic Progress Note    Patient ID: Angel Yanez is a 60 yo man 1 year and 1 mo s/p 2nd autologous transplant for MM.     Interval history:     Guille returns for follow up. Only complaint is itching skin. No rashes. Hasn't tried benadryl or lotions. No new detergents. No bleeding. No fevers or SOB. Biking and working on his deck.     ROS: 8 point ROS neg unless specified above.       Physical Exam:   Blood pressure 129/87, pulse 76, temperature 97.5  F (36.4  C), temperature source Oral, resp. rate 18, height 1.778 m (5' 10\"), weight 80 kg (176 lb 6.4 oz), SpO2 99 %.    Wt Readings from Last 4 Encounters:   07/21/20 80 kg (176 lb 6.4 oz)   07/14/20 79.9 kg (176 lb 1.6 oz)   07/07/20 80.5 kg (177 lb 6.4 oz)   06/25/20 80.6 kg (177 lb 9.6 oz)     General: NAD, engaged  HEENT: normocephalic, atraumatic, OP non erythematous, no petechiae or lesions  Lungs: CTAB  Cardiovascular: RRR, S1, S2, no m/r/g  GI/Abdomen: +BS, soft, nontender, non-distended  Skin: No rashes or petechaie.    MSK/Extremities: Warm, well perfused. No edema  Neurologic: alert, and conversant  Access: peripheral     Data:     Lab Results   Component Value Date    WBC 5.4 07/21/2020    ANEU 3.0 07/21/2020    HGB 11.7 (L) 07/21/2020    HCT 35.7 (L) 07/21/2020    PLT 75 (L) 07/21/2020     (L) 07/21/2020    POTASSIUM 4.2 07/21/2020    CHLORIDE 101 07/21/2020    CO2 26 07/21/2020    GLC 86 07/21/2020    BUN 21 07/21/2020    CR 0.93 07/21/2020    MAG 2.2 05/19/2020    INR 1.03 04/30/2020    BILITOTAL 0.4 07/21/2020    AST 14 07/21/2020    ALT 22 07/21/2020    ALKPHOS 82 07/21/2020    PROTTOTAL 8.1 07/21/2020    ALBUMIN 4.0 07/21/2020       Assessment and Plan:     Angel Yanez is a 60 yo man 1 year and 1 mo s/p 2nd autologous transplant for MM with ongoing cytopenias.     1.  BMT/MM:   Slow engraftment; cell dose was only 0.639x10^6. (Marrow [place] - known poor cell dose as failed chemo-mobilization 1/2019.)   - day +180 bone marrow biopsy " 11/6/19 which showed no morphologic or immunophenotypic evidence of plasma cell neoplasm. His light chains were not elevated and he had no monoclonal protein in the serum.  He is in clinical remission.  - PET in 8/2019 clear.   - 1 year bmbx See Naveed's complete note: no monoclonal protein (his disease was originally IgA kappa) and other chemistry studies showed no signs of residual disease. An x-ray bone survey shows no change from 1 year earlier and a PET CT scan shows no hypermetabolic lesions except the right anterior third rib... It does not look to be a myeloma lesion but rather a subacute rib fracture with hypermetabolic signs of healing. Myeloma in remission, however marrow is hypocellular.    2.  HEME: Keep Hgb>8g/dL, plt >10k.   - Persistant profound thrombocytopenia. Experienced improvement on Nplate (started 3/19). However June came to plateau and now trending down. Cont on max dose 10mcg/kg.  - Per Dr Lucio once plts 100k then decrease nplate to every other week and see if plts can be maintained on less frequent dosing. Plts stable at 75k (last dose of NPLATE 7/14. Will NOT give today and see where plts are at next week.   - Previous trial of promacta not helpful. Stopped 12/6/19. Trial of prednisone not helpful, Pred taper complete on 4/16.      3.  ID: afebrile   - FUOs: Extensive ID work up- no etiology identified. Now afebrile   - Given 1 year vaccines 6/4/20, will need boosters i8/2020     Prophy: ACV, Fluconazole   - influenza vaccination given 11/26/19     4. CV: Hx of steroid induced HTN  - Had been on Lisinopril 10mg daily. Dropped to 5mg daily 6/4 d/t lightheadedness. Symptoms improved. BPs slightly higher, increased lisinopril to 10mg daily, no issues with lightheadedness and BP at goal     5.  FEN/Renal: Cr and lytes WNL     6.  Mood: Continue Paxil.     7. FUOs - Some kind of auto immune process DDX: sarcoidosis? Underwent EBUS FNA which was negative for granulomas however unlikely  you could see this on just fluid aspirate. FUOs resolved with steroids and has not recurred.       RTC 7/28 for next dose nplate, labs and provider visit   When plts =/>100k decrease to QOW appts/nplate  Booster vaccines in early August    Mariana Jolly NP

## 2020-07-21 NOTE — NURSING NOTE
Chief Complaint   Patient presents with     Blood Draw     labs drawn via venipuncture by RN in lab     /87 (BP Location: Left arm, Patient Position: Chair, Cuff Size: Adult Regular)   Pulse 76   Temp 97.5  F (36.4  C) (Oral)   Resp 18   Wt 80 kg (176 lb 6.4 oz)   SpO2 99%   BMI 25.31 kg/m      Labs collected and sent from left antecubital venipuncture in lab by RN. Pt tolerated well.   Pt checked in for next appointment.    Maria Teresa Bourgeois RN

## 2020-07-21 NOTE — NURSING NOTE
"Oncology Rooming Note    July 21, 2020 11:21 AM   Angel Yanez is a 60 year old male who presents for:    Chief Complaint   Patient presents with     Blood Draw     labs drawn via venipuncture by RN in lab     RECHECK     return: Multiple myeloma     Initial Vitals: /87 (BP Location: Left arm, Patient Position: Chair, Cuff Size: Adult Regular)   Pulse 76   Temp 97.5  F (36.4  C) (Oral)   Resp 18   Ht 1.778 m (5' 10\")   Wt 80 kg (176 lb 6.4 oz)   SpO2 99%   BMI 25.31 kg/m   Estimated body mass index is 25.31 kg/m  as calculated from the following:    Height as of this encounter: 1.778 m (5' 10\").    Weight as of this encounter: 80 kg (176 lb 6.4 oz). Body surface area is 1.99 meters squared.  No Pain (0) Comment: Data Unavailable   No LMP for male patient.  Allergies reviewed: No  Medications reviewed: Yes    Medications: Medication refills not needed today.  Pharmacy name entered into EPIC:    [facility] - [place], [place] - [address]  [facility] - [place], [place] - [address]    Clinical concerns: N/A     Meggan Flannery MA            "

## 2020-07-27 LAB
LAB SCANNED RESULT: NORMAL
LAB SCANNED RESULT: NORMAL

## 2020-07-29 ENCOUNTER — ONCOLOGY VISIT (OUTPATIENT)
Dept: TRANSPLANT | Facility: CLINIC | Age: 60
End: 2020-07-29
Attending: NURSE PRACTITIONER
Payer: COMMERCIAL

## 2020-07-29 ENCOUNTER — APPOINTMENT (OUTPATIENT)
Dept: LAB | Facility: CLINIC | Age: 60
End: 2020-07-29
Attending: INTERNAL MEDICINE
Payer: COMMERCIAL

## 2020-07-29 VITALS
OXYGEN SATURATION: 98 % | HEIGHT: 70 IN | SYSTOLIC BLOOD PRESSURE: 113 MMHG | RESPIRATION RATE: 18 BRPM | BODY MASS INDEX: 25.43 KG/M2 | WEIGHT: 177.6 LBS | HEART RATE: 72 BPM | TEMPERATURE: 98.3 F | DIASTOLIC BLOOD PRESSURE: 68 MMHG

## 2020-07-29 DIAGNOSIS — Z94.81 STATUS POST BONE MARROW TRANSPLANT (H): ICD-10-CM

## 2020-07-29 DIAGNOSIS — D69.6 THROMBOCYTOPENIA (H): Primary | ICD-10-CM

## 2020-07-29 LAB
ANION GAP SERPL CALCULATED.3IONS-SCNC: 7 MMOL/L (ref 3–14)
BASOPHILS # BLD AUTO: 0 10E9/L (ref 0–0.2)
BASOPHILS NFR BLD AUTO: 0.8 %
BUN SERPL-MCNC: 18 MG/DL (ref 7–30)
CALCIUM SERPL-MCNC: 8.7 MG/DL (ref 8.5–10.1)
CHLORIDE SERPL-SCNC: 106 MMOL/L (ref 94–109)
CO2 SERPL-SCNC: 24 MMOL/L (ref 20–32)
CREAT SERPL-MCNC: 0.85 MG/DL (ref 0.66–1.25)
DIFFERENTIAL METHOD BLD: ABNORMAL
EOSINOPHIL # BLD AUTO: 0.3 10E9/L (ref 0–0.7)
EOSINOPHIL NFR BLD AUTO: 6.7 %
ERYTHROCYTE [DISTWIDTH] IN BLOOD BY AUTOMATED COUNT: 12 % (ref 10–15)
GFR SERPL CREATININE-BSD FRML MDRD: >90 ML/MIN/{1.73_M2}
GLUCOSE SERPL-MCNC: 114 MG/DL (ref 70–99)
HCT VFR BLD AUTO: 34.6 % (ref 40–53)
HGB BLD-MCNC: 11.4 G/DL (ref 13.3–17.7)
IMM GRANULOCYTES # BLD: 0 10E9/L (ref 0–0.4)
IMM GRANULOCYTES NFR BLD: 0.2 %
LYMPHOCYTES # BLD AUTO: 1.1 10E9/L (ref 0.8–5.3)
LYMPHOCYTES NFR BLD AUTO: 22.7 %
MCH RBC QN AUTO: 34.2 PG (ref 26.5–33)
MCHC RBC AUTO-ENTMCNC: 32.9 G/DL (ref 31.5–36.5)
MCV RBC AUTO: 104 FL (ref 78–100)
MONOCYTES # BLD AUTO: 0.7 10E9/L (ref 0–1.3)
MONOCYTES NFR BLD AUTO: 14.8 %
NEUTROPHILS # BLD AUTO: 2.6 10E9/L (ref 1.6–8.3)
NEUTROPHILS NFR BLD AUTO: 54.8 %
NRBC # BLD AUTO: 0 10*3/UL
NRBC BLD AUTO-RTO: 0 /100
PLATELET # BLD AUTO: 99 10E9/L (ref 150–450)
POTASSIUM SERPL-SCNC: 4 MMOL/L (ref 3.4–5.3)
RBC # BLD AUTO: 3.33 10E12/L (ref 4.4–5.9)
SODIUM SERPL-SCNC: 137 MMOL/L (ref 133–144)
WBC # BLD AUTO: 4.8 10E9/L (ref 4–11)

## 2020-07-29 PROCEDURE — 80048 BASIC METABOLIC PNL TOTAL CA: CPT | Performed by: NURSE PRACTITIONER

## 2020-07-29 PROCEDURE — 85025 COMPLETE CBC W/AUTO DIFF WBC: CPT | Performed by: NURSE PRACTITIONER

## 2020-07-29 PROCEDURE — 36415 COLL VENOUS BLD VENIPUNCTURE: CPT

## 2020-07-29 PROCEDURE — G0463 HOSPITAL OUTPT CLINIC VISIT: HCPCS | Mod: ZF

## 2020-07-29 ASSESSMENT — MIFFLIN-ST. JEOR: SCORE: 1611.84

## 2020-07-29 ASSESSMENT — PAIN SCALES - GENERAL: PAINLEVEL: NO PAIN (0)

## 2020-07-29 NOTE — LETTER
7/29/2020         RE: Angel Yanez  [address]  [facility] [address]        Dear Colleague,    Thank you for referring your patient, Angel Yanez, to the [facility] BLOOD AND MARROW TRANSPLANT. Please see a copy of my visit note below.    BMT Clinic Progress Note    Patient ID: Angel Yanez is a 58 yo man 1 year and 2 mo s/p 2nd autologous transplant for MM.     Interval history:     Guille returns for follow up. Has no complaints today. Feeling well overall. Eating and drinking well. Works full time. No naps. No other complaints.    ROS: 8 point ROS neg unless specified above.       Physical Exam:   There were no vitals taken for this visit.    Wt Readings from Last 4 Encounters:   07/21/20 80 kg (176 lb 6.4 oz)   07/14/20 79.9 kg (176 lb 1.6 oz)   07/07/20 80.5 kg (177 lb 6.4 oz)   06/25/20 80.6 kg (177 lb 9.6 oz)     General: NAD, engaged  HEENT: normocephalic, atraumatic, OP non erythematous, no petechiae or lesions  Lungs: CTAB  Cardiovascular: RRR, S1, S2, no m/r/g  GI/Abdomen: +BS, soft, nontender, non-distended  Skin: No rashes or petechaie.    MSK/Extremities: Warm, well perfused. No edema  Neurologic: alert, and conversant  Access: peripheral     Data:     Lab Results   Component Value Date    WBC 5.4 07/21/2020    ANEU 3.0 07/21/2020    HGB 11.7 (L) 07/21/2020    HCT 35.7 (L) 07/21/2020    PLT 75 (L) 07/21/2020     (L) 07/21/2020    POTASSIUM 4.2 07/21/2020    CHLORIDE 101 07/21/2020    CO2 26 07/21/2020    GLC 86 07/21/2020    BUN 21 07/21/2020    CR 0.93 07/21/2020    MAG 2.2 05/19/2020    INR 1.03 04/30/2020    BILITOTAL 0.4 07/21/2020    AST 14 07/21/2020    ALT 22 07/21/2020    ALKPHOS 82 07/21/2020    PROTTOTAL 8.1 07/21/2020    ALBUMIN 4.0 07/21/2020       Assessment and Plan:     Angel Yanez is a 58 yo man 1 year and 1 mo s/p 2nd autologous transplant for MM with ongoing cytopenias.     1.  BMT/MM:   Slow engraftment; cell dose was only 0.639x10^6. (Marrow [place] -  known poor cell dose as failed chemo-mobilization 1/2019.)   - day +180 bone marrow biopsy 11/6/19 which showed no morphologic or immunophenotypic evidence of plasma cell neoplasm. His light chains were not elevated and he had no monoclonal protein in the serum.  He is in clinical remission.  - PET in 8/2019 clear.  - 1 year bmbx See Naveed's complete note: no monoclonal protein (his disease was originally IgA kappa) and other chemistry studies showed no signs of residual disease. An x-ray bone survey shows no change from 1 year earlier and a PET CT scan shows no hypermetabolic lesions except the right anterior third rib... It does not look to be a myeloma lesion but rather a subacute rib fracture with hypermetabolic signs of healing. Myeloma in remission, however marrow is hypocellular.    2.  HEME: Keep Hgb>8g/dL, plt >10k.   - Persistent profound thrombocytopenia. Experienced improvement on Nplate (started 3/19). However June came to plateau and now trending down. Cont on max dose 10mcg/kg.  - Per Dr Lucio once plts 100k then decrease nplate to every other week and see if plts can be maintained on less frequent dosing. Plts stable at 75k (last dose of NPLATE 7/14. He wanted to come back next week for the injection and to see where his platelets are at  - Previous trial of promacta not helpful. Stopped 12/6/19. Trial of prednisone not helpful, Pred taper complete on 4/16.      3.  ID: afebrile   - FUOs: Extensive ID work up- no etiology identified. Now afebrile   - Given 1 year vaccines 6/4/20, will need boosters i8/2020     Prophy: ACV, Fluconazole   - influenza vaccination given 11/26/19     4. CV: Hx of steroid induced HTN  - Had been on Lisinopril 10mg daily. Dropped to 5mg daily 6/4 d/t lightheadedness. Symptoms improved. BPs slightly higher, increased lisinopril to 10mg daily, no issues with lightheadedness and BP at goal     5.  FEN/Renal: Cr and lytes WNL     6.  Mood: Continue Paxil.     7. FUOs -  Some kind of auto immune process DDX: sarcoidosis? Underwent EBUS FNA which was negative for granulomas however unlikely you could see this on just fluid aspirate. FUOs resolved with steroids and has not recurred.     RTC 8/5 for labs, vaccines, and give Nplate if plt < 100  RTC in 3 weeks for labs, Nplate, МАРИНА        Again, thank you for allowing me to participate in the care of your patient.        Sincerely,        BMT DOM

## 2020-07-29 NOTE — NURSING NOTE
"Oncology Rooming Note    July 29, 2020 2:20 PM   Angel Yanez is a 60 year old male who presents for:    Chief Complaint   Patient presents with     Blood Draw     Labs drawn via  by RN in lab. VS taken.     RECHECK     Return: Multiple myeloma     Initial Vitals: /68 (BP Location: Right arm, Patient Position: Sitting, Cuff Size: Adult Regular)   Pulse 72   Temp 98.3  F (36.8  C) (Oral)   Resp 18   Ht 1.778 m (5' 10\")   Wt 80.6 kg (177 lb 9.6 oz)   SpO2 98%   BMI 25.48 kg/m   Estimated body mass index is 25.48 kg/m  as calculated from the following:    Height as of this encounter: 1.778 m (5' 10\").    Weight as of this encounter: 80.6 kg (177 lb 9.6 oz). Body surface area is 2 meters squared.  No Pain (0) Comment: Data Unavailable   No LMP for male patient.  Allergies reviewed: Yes  Medications reviewed: Yes    Medications: Medication refills not needed today.  Pharmacy name entered into EPIC:    [facility] - [place], [place] - [address] [facility] - [place], [place] - [address] [facility]     Clinical concerns: N/A        Yesica Duncan CMA              "

## 2020-07-29 NOTE — PROGRESS NOTES
BMT Clinic Progress Note    Patient ID: Angel Yanez is a 60 yo man 1 year and 2 mo s/p 2nd autologous transplant for MM.     Interval history:     Guille returns for follow up. Has no complaints today. Feeling well overall. Eating and drinking well. Works full time. No naps. No other complaints.    ROS: 8 point ROS neg unless specified above.       Physical Exam:   There were no vitals taken for this visit.    Wt Readings from Last 4 Encounters:   07/21/20 80 kg (176 lb 6.4 oz)   07/14/20 79.9 kg (176 lb 1.6 oz)   07/07/20 80.5 kg (177 lb 6.4 oz)   06/25/20 80.6 kg (177 lb 9.6 oz)     General: NAD, engaged  HEENT: normocephalic, atraumatic, OP non erythematous, no petechiae or lesions  Lungs: CTAB  Cardiovascular: RRR, S1, S2, no m/r/g  GI/Abdomen: +BS, soft, nontender, non-distended  Skin: No rashes or petechaie.    MSK/Extremities: Warm, well perfused. No edema  Neurologic: alert, and conversant  Access: peripheral     Data:     Lab Results   Component Value Date    WBC 5.4 07/21/2020    ANEU 3.0 07/21/2020    HGB 11.7 (L) 07/21/2020    HCT 35.7 (L) 07/21/2020    PLT 75 (L) 07/21/2020     (L) 07/21/2020    POTASSIUM 4.2 07/21/2020    CHLORIDE 101 07/21/2020    CO2 26 07/21/2020    GLC 86 07/21/2020    BUN 21 07/21/2020    CR 0.93 07/21/2020    MAG 2.2 05/19/2020    INR 1.03 04/30/2020    BILITOTAL 0.4 07/21/2020    AST 14 07/21/2020    ALT 22 07/21/2020    ALKPHOS 82 07/21/2020    PROTTOTAL 8.1 07/21/2020    ALBUMIN 4.0 07/21/2020       Assessment and Plan:     Angel Yanez is a 60 yo man 1 year and 1 mo s/p 2nd autologous transplant for MM with ongoing cytopenias.     1.  BMT/MM:   Slow engraftment; cell dose was only 0.639x10^6. ([facility] [place] - known poor cell dose as failed chemo-mobilization 1/2019.)   - day +180 bone marrow biopsy 11/6/19 which showed no morphologic or immunophenotypic evidence of plasma cell neoplasm. His light chains were not elevated and he had no monoclonal protein in the  serum.  He is in clinical remission.  - PET in 8/2019 clear.  - 1 year bmbx See [facility] complete note: no monoclonal protein (his disease was originally IgA kappa) and other chemistry studies showed no signs of residual disease. An x-ray bone survey shows no change from 1 year earlier and a PET CT scan shows no hypermetabolic lesions except the right anterior third rib... It does not look to be a myeloma lesion but rather a subacute rib fracture with hypermetabolic signs of healing. Myeloma in remission, however marrow is hypocellular.    2.  HEME: Keep Hgb>8g/dL, plt >10k.   - Persistent profound thrombocytopenia. Experienced improvement on Nplate (started 3/19). However June came to plateau and now trending down. Cont on max dose 10mcg/kg.  - Per Dr Lucio once plts 100k then decrease nplate to every other week and see if plts can be maintained on less frequent dosing. Plts stable at 75k (last dose of NPLATE 7/14. He wanted to come back next week for the injection and to see where his platelets are at  - Previous trial of promacta not helpful. Stopped 12/6/19. Trial of prednisone not helpful, Pred taper complete on 4/16.      3.  ID: afebrile   - FUOs: Extensive ID work up- no etiology identified. Now afebrile   - Given 1 year vaccines 6/4/20, will need boosters i8/2020     Prophy: ACV, Fluconazole   - influenza vaccination given 11/26/19     4. CV: Hx of steroid induced HTN  - Had been on Lisinopril 10mg daily. Dropped to 5mg daily 6/4 d/t lightheadedness. Symptoms improved. BPs slightly higher, increased lisinopril to 10mg daily, no issues with lightheadedness and BP at goal     5.  FEN/Renal: Cr and lytes WNL     6.  Mood: Continue Paxil.     7. FUOs - Some kind of auto immune process DDX: sarcoidosis? Underwent EBUS FNA which was negative for granulomas however unlikely you could see this on just fluid aspirate. FUOs resolved with steroids and has not recurred.     RTC 8/5 for labs, vaccines, and give  Nplate if plt < 100  RTC in 3 weeks for labs, Nplate, МАРИНА

## 2020-08-04 NOTE — PROGRESS NOTES
BMT Clinic Note    Patient ID: Angel Yanez is a 58 yo man 1 year and 2 mo s/p 2nd autologous transplant for MM.     Interval history:     Guille returns for follow up.Feeling good.  C/O some hip & knee arthritis pain, otherwise no issues       ROS: 8 point ROS neg unless specified above.       Physical Exam:   Blood pressure 131/83, pulse 55, temperature 97.3  F (36.3  C), temperature source Oral, resp. rate 16, weight 80.2 kg (176 lb 12.8 oz), SpO2 99 %.    Wt Readings from Last 4 Encounters:   08/05/20 80.2 kg (176 lb 12.8 oz)   07/29/20 80.6 kg (177 lb 9.6 oz)   07/21/20 80 kg (176 lb 6.4 oz)   07/14/20 79.9 kg (176 lb 1.6 oz)     General: NAD  HEENT:FARRAH.  Sclera anicteric.  OP clear, buccal mucosa moist  Lungs: CTAB  Cardiovascular: RRR  GI/Abdomen: +BS, soft, nontender, non-distended  Skin: No rashes or petechaie.    MSK/Extremities: Warm, well perfused. No edema  Neurologic: alert, and conversant    Data:     Lab Results   Component Value Date    WBC 4.7 08/05/2020    ANEU 2.3 08/05/2020    HGB 12.0 (L) 08/05/2020    HCT 36.5 (L) 08/05/2020    PLT 99 (L) 08/05/2020     07/29/2020    POTASSIUM 4.0 07/29/2020    CHLORIDE 106 07/29/2020    CO2 24 07/29/2020     (H) 07/29/2020    BUN 18 07/29/2020    CR 0.85 07/29/2020    MAG 2.2 05/19/2020    INR 1.03 04/30/2020    BILITOTAL 0.4 07/21/2020    AST 14 07/21/2020    ALT 22 07/21/2020    ALKPHOS 82 07/21/2020    PROTTOTAL 8.1 07/21/2020    ALBUMIN 4.0 07/21/2020       Assessment and Plan:     Angel Yanez is a 58 yo man 1 year and 2 mo s/p 2nd autologous transplant for MM with ongoing cytopenias.     1.  BMT/MM:   Slow engraftment; cell dose was only 0.639x10^6. ([facility] [place] - known poor cell dose as failed chemo-mobilization 1/2019.)   - day +180 bone marrow biopsy 11/6/19 which showed no morphologic or immunophenotypic evidence of plasma cell neoplasm. His light chains were not elevated and he had no monoclonal protein in the serum.  He is in  clinical remission.  - PET in 8/2019 clear.   - 1 year bmbx See [facility]'s complete note: no monoclonal protein (his disease was originally IgA kappa) and other chemistry studies showed no signs of residual disease. An x-ray bone survey shows no change from 1 year earlier and a PET CT scan shows no hypermetabolic lesions except the right anterior third rib... It does not look to be a myeloma lesion but rather a subacute rib fracture with hypermetabolic signs of healing. Myeloma in remission, however marrow is hypocellular.    2.  HEME: Keep Hgb>8g/dL, plt >10k.   - Persistant profound thrombocytopenia. Nplate started 3/19 with some improvement.  Plt reached a plateau on max dose N plate.  Las dose 7/14 & plt are stable so will not dose today.    - Previous trial of promacta not helpful. Stopped 12/6/19. Trial of prednisone not helpful, Pred taper complete on 4/16.      3.  ID: afebrile   - Hx FUOs: Extensive ID work up- no etiology identified.   - Given 1 year vaccines 6/4/20, will need boosters i8/2020     Prophy: ACV, Fluconazole   - influenza vaccination given 11/26/19     4. CV: HTN.  On lisinopril 10mg daily     5.  FEN/Renal: Cr and lytes WNL     6.  Mood: Continue Paxil.     7. FUOs - Some kind of auto immune process DDX: sarcoidosis? Underwent EBUS FNA which was negative for granulomas however unlikely you could see this on just fluid aspirate. FUOs resolved with steroids and has not recurred.       RTC 2 weeks, vaccines then as well      Karla Manzo

## 2020-08-05 ENCOUNTER — ONCOLOGY VISIT (OUTPATIENT)
Dept: TRANSPLANT | Facility: CLINIC | Age: 60
End: 2020-08-05
Attending: INTERNAL MEDICINE
Payer: COMMERCIAL

## 2020-08-05 ENCOUNTER — APPOINTMENT (OUTPATIENT)
Dept: LAB | Facility: CLINIC | Age: 60
End: 2020-08-05
Attending: INTERNAL MEDICINE
Payer: COMMERCIAL

## 2020-08-05 VITALS
RESPIRATION RATE: 16 BRPM | BODY MASS INDEX: 25.37 KG/M2 | OXYGEN SATURATION: 99 % | SYSTOLIC BLOOD PRESSURE: 131 MMHG | WEIGHT: 176.8 LBS | TEMPERATURE: 97.3 F | DIASTOLIC BLOOD PRESSURE: 83 MMHG | HEART RATE: 55 BPM

## 2020-08-05 DIAGNOSIS — Z94.81 STATUS POST BONE MARROW TRANSPLANT (H): ICD-10-CM

## 2020-08-05 DIAGNOSIS — D69.6 THROMBOCYTOPENIA (H): Primary | ICD-10-CM

## 2020-08-05 LAB
BASOPHILS # BLD AUTO: 0 10E9/L (ref 0–0.2)
BASOPHILS NFR BLD AUTO: 0.9 %
DIFFERENTIAL METHOD BLD: ABNORMAL
EOSINOPHIL # BLD AUTO: 0.2 10E9/L (ref 0–0.7)
EOSINOPHIL NFR BLD AUTO: 4.9 %
ERYTHROCYTE [DISTWIDTH] IN BLOOD BY AUTOMATED COUNT: 12.3 % (ref 10–15)
HCT VFR BLD AUTO: 36.5 % (ref 40–53)
HGB BLD-MCNC: 12 G/DL (ref 13.3–17.7)
IMM GRANULOCYTES # BLD: 0 10E9/L (ref 0–0.4)
IMM GRANULOCYTES NFR BLD: 0.2 %
LYMPHOCYTES # BLD AUTO: 1.3 10E9/L (ref 0.8–5.3)
LYMPHOCYTES NFR BLD AUTO: 27.1 %
MCH RBC QN AUTO: 34.5 PG (ref 26.5–33)
MCHC RBC AUTO-ENTMCNC: 32.9 G/DL (ref 31.5–36.5)
MCV RBC AUTO: 105 FL (ref 78–100)
MONOCYTES # BLD AUTO: 0.8 10E9/L (ref 0–1.3)
MONOCYTES NFR BLD AUTO: 17.3 %
NEUTROPHILS # BLD AUTO: 2.3 10E9/L (ref 1.6–8.3)
NEUTROPHILS NFR BLD AUTO: 49.6 %
NRBC # BLD AUTO: 0 10*3/UL
NRBC BLD AUTO-RTO: 0 /100
PLATELET # BLD AUTO: 99 10E9/L (ref 150–450)
RBC # BLD AUTO: 3.48 10E12/L (ref 4.4–5.9)
WBC # BLD AUTO: 4.7 10E9/L (ref 4–11)

## 2020-08-05 PROCEDURE — 36415 COLL VENOUS BLD VENIPUNCTURE: CPT

## 2020-08-05 PROCEDURE — G0463 HOSPITAL OUTPT CLINIC VISIT: HCPCS

## 2020-08-05 PROCEDURE — 85025 COMPLETE CBC W/AUTO DIFF WBC: CPT | Performed by: STUDENT IN AN ORGANIZED HEALTH CARE EDUCATION/TRAINING PROGRAM

## 2020-08-05 ASSESSMENT — PAIN SCALES - GENERAL: PAINLEVEL: NO PAIN (0)

## 2020-08-05 NOTE — PROGRESS NOTES
Chief Complaint   Patient presents with     Blood Draw     Vitals, blood drawn via VPT by LPN. Pt checked into appt.      DEBBIE Hernandez LPN

## 2020-08-05 NOTE — NURSING NOTE
"Oncology Rooming Note    August 5, 2020 12:34 PM   Angel Yanez is a 60 year old male who presents for:    Chief Complaint   Patient presents with     Blood Draw     Vitals, blood drawn via VPT by LPN. Pt checked into appt.      RECHECK     Pt is here for a rtn for S/P BMT for MM-- Thrombocytopenia     Initial Vitals: Blood Pressure 131/83   Pulse 55   Temperature 97.3  F (36.3  C) (Oral)   Respiration 16   Weight 80.2 kg (176 lb 12.8 oz)   Oxygen Saturation 99%   Body Mass Index 25.37 kg/m   Estimated body mass index is 25.37 kg/m  as calculated from the following:    Height as of 7/29/20: 1.778 m (5' 10\").    Weight as of this encounter: 80.2 kg (176 lb 12.8 oz). Body surface area is 1.99 meters squared.  No Pain (0) Comment: Data Unavailable   No LMP for male patient.  Allergies reviewed: Yes  Medications reviewed: Yes    Medications: Medication refills not needed today.  Pharmacy name entered into EPIC:    [facility] - [place], [place] - [address] [facility] - [place], [place] - [address]    Clinical concerns: none       Shoshana Salazar MA            "

## 2020-08-05 NOTE — LETTER
8/5/2020         RE: Angel Yanez  [address]  [place] [facility] [address]        Dear Colleague,    Thank you for referring your patient, Angel Yanez, to the [facility] BLOOD AND MARROW TRANSPLANT. Please see a copy of my visit note below.    BMT Clinic Note    Patient ID: Angel Yanez is a 60 yo man 1 year and 2 mo s/p 2nd autologous transplant for MM.     Interval history:     Guille returns for follow up.Feeling good.  C/O some hip & knee arthritis pain, otherwise no issues       ROS: 8 point ROS neg unless specified above.       Physical Exam:   Blood pressure 131/83, pulse 55, temperature 97.3  F (36.3  C), temperature source Oral, resp. rate 16, weight 80.2 kg (176 lb 12.8 oz), SpO2 99 %.    Wt Readings from Last 4 Encounters:   08/05/20 80.2 kg (176 lb 12.8 oz)   07/29/20 80.6 kg (177 lb 9.6 oz)   07/21/20 80 kg (176 lb 6.4 oz)   07/14/20 79.9 kg (176 lb 1.6 oz)     General: NAD  HEENT:FARRAH.  Sclera anicteric.  OP clear, buccal mucosa moist  Lungs: CTAB  Cardiovascular: RRR  GI/Abdomen: +BS, soft, nontender, non-distended  Skin: No rashes or petechaie.    MSK/Extremities: Warm, well perfused. No edema  Neurologic: alert, and conversant    Data:     Lab Results   Component Value Date    WBC 4.7 08/05/2020    ANEU 2.3 08/05/2020    HGB 12.0 (L) 08/05/2020    HCT 36.5 (L) 08/05/2020    PLT 99 (L) 08/05/2020     07/29/2020    POTASSIUM 4.0 07/29/2020    CHLORIDE 106 07/29/2020    CO2 24 07/29/2020     (H) 07/29/2020    BUN 18 07/29/2020    CR 0.85 07/29/2020    MAG 2.2 05/19/2020    INR 1.03 04/30/2020    BILITOTAL 0.4 07/21/2020    AST 14 07/21/2020    ALT 22 07/21/2020    ALKPHOS 82 07/21/2020    PROTTOTAL 8.1 07/21/2020    ALBUMIN 4.0 07/21/2020       Assessment and Plan:     Angel Yanez is a 60 yo man 1 year and 2 mo s/p 2nd autologous transplant for MM with ongoing cytopenias.     1.  BMT/MM:   Slow engraftment; cell dose was only 0.639x10^6. (M[facility] [place] - known poor cell  dose as failed chemo-mobilization 1/2019.)   - day +180 bone marrow biopsy 11/6/19 which showed no morphologic or immunophenotypic evidence of plasma cell neoplasm. His light chains were not elevated and he had no monoclonal protein in the serum.  He is in clinical remission.  - PET in 8/2019 clear.   - 1 year bmbx See [facility]'s complete note: no monoclonal protein (his disease was originally IgA kappa) and other chemistry studies showed no signs of residual disease. An x-ray bone survey shows no change from 1 year earlier and a PET CT scan shows no hypermetabolic lesions except the right anterior third rib... It does not look to be a myeloma lesion but rather a subacute rib fracture with hypermetabolic signs of healing. Myeloma in remission, however marrow is hypocellular.    2.  HEME: Keep Hgb>8g/dL, plt >10k.   - Persistant profound thrombocytopenia. Nplate started 3/19 with some improvement.  Plt reached a plateau on max dose N plate.  Las dose 7/14 & plt are stable so will not dose today.    - Previous trial of promacta not helpful. Stopped 12/6/19. Trial of prednisone not helpful, Pred taper complete on 4/16.      3.  ID: afebrile   - Hx FUOs: Extensive ID work up- no etiology identified.   - Given 1 year vaccines 6/4/20, will need boosters i8/2020     Prophy: ACV, Fluconazole   - influenza vaccination given 11/26/19     4. CV: HTN.  On lisinopril 10mg daily     5.  FEN/Renal: Cr and lytes WNL     6.  Mood: Continue Paxil.     7. FUOs - Some kind of auto immune process DDX: sarcoidosis? Underwent EBUS FNA which was negative for granulomas however unlikely you could see this on just fluid aspirate. FUOs resolved with steroids and has not recurred.       RTC 2 weeks, vaccines then as well      Karla Manzo    Chief Complaint   Patient presents with     Blood Draw     Vitals, blood drawn via VPT by LPN. Pt checked into citlaly.      DEBBIE Hernandez LPN    Again, thank you for allowing me to participate in the care  of your patient.        Sincerely,        BMT Advanced Practice Provider

## 2020-08-18 NOTE — PROGRESS NOTES
BMT Clinic Note    Patient ID: Angel Yanez is a 58 yo man 1 year and 2 mo s/p 2nd autologous transplant for MM.     Interval history:     Guille returns for follow up.Feeling good.  No complaints      ROS: 8 point ROS neg unless specified above.       Physical Exam:   Blood pressure 115/71, pulse 75, temperature 98.2  F (36.8  C), temperature source Oral, resp. rate 18, weight 79.6 kg (175 lb 6.4 oz), SpO2 97 %.      General: NAD  HEENT:FARRAH.  Sclera anicteric.  OP clear, buccal mucosa moist  Lungs: CTAB  Cardiovascular: RRR  GI/Abdomen: +BS, soft, nontender, non-distended  Skin: No rashes or petechaie.    MSK/Extremities: Warm, well perfused. No edema  Neurologic: alert, and conversant    Data:     Lab Results   Component Value Date    WBC 3.8 (L) 08/19/2020    ANEU 2.2 08/19/2020    HGB 11.4 (L) 08/19/2020    HCT 34.2 (L) 08/19/2020    PLT 79 (L) 08/19/2020     08/19/2020    POTASSIUM 4.3 08/19/2020    CHLORIDE 107 08/19/2020    CO2 27 08/19/2020    GLC 91 08/19/2020    BUN 20 08/19/2020    CR 0.87 08/19/2020    MAG 2.2 08/19/2020    INR 1.03 04/30/2020    BILITOTAL 0.4 08/19/2020    AST 19 08/19/2020    ALT 29 08/19/2020    ALKPHOS 68 08/19/2020    PROTTOTAL 7.4 08/19/2020    ALBUMIN 3.7 08/19/2020       Assessment and Plan:     Angel Yanez is a 58 yo man 1 year and 2 mo s/p 2nd autologous transplant for MM with ongoing cytopenias.     1.  BMT/MM:   Slow engraftment; cell dose was only 0.639x10^6. (Marrow [place] - known poor cell dose as failed chemo-mobilization 1/2019.)   - day +180 bone marrow biopsy 11/6/19 which showed no morphologic or immunophenotypic evidence of plasma cell neoplasm. His light chains were not elevated and he had no monoclonal protein in the serum.  He is in clinical remission.  - PET in 8/2019 clear.   - 1 year bmbx See [facility]'s complete note: no monoclonal protein (his disease was originally IgA kappa) and other chemistry studies showed no signs of residual disease. An  x-ray bone survey shows no change from 1 year earlier and a PET CT scan shows no hypermetabolic lesions except the right anterior third rib... It does not look to be a myeloma lesion but rather a subacute rib fracture with hypermetabolic signs of healing. Myeloma in remission, however marrow is hypocellular.    2.  HEME: Keep Hgb>8g/dL, plt >10k.   - Persistant profound thrombocytopenia. Nplate started 3/19 with some improvement.  Plt reached a plateau on max dose N plate.  Last dose 7/14.  Will not dose today & recheck in 1 week.  If plt still downtrending then will likely give N plate  - Previous trial of promacta not helpful. Stopped 12/6/19. Trial of prednisone not helpful, Pred taper complete on 4/16.      3.  ID: afebrile   - Hx FUOs: Extensive ID work up- no etiology identified.   - Given 1 year vaccines 6/4/20, Boosters today    Prophy: ACV, Fluconazole   - influenza vaccination given 11/26/19     4. CV: HTN.  On lisinopril 10mg daily     5.  FEN/Renal: Cr and lytes WNL     6.  Mood: Continue Paxil.     7. FUOs - Some kind of auto immune process DDX: sarcoidosis? Underwent EBUS FNA which was negative for granulomas however unlikely you could see this on just fluid aspirate. FUOs resolved with steroids and has not recurred.       RTC 2 weeks      Karla Manzo

## 2020-08-19 ENCOUNTER — APPOINTMENT (OUTPATIENT)
Dept: LAB | Facility: CLINIC | Age: 60
End: 2020-08-19
Attending: INTERNAL MEDICINE
Payer: COMMERCIAL

## 2020-08-19 ENCOUNTER — ONCOLOGY VISIT (OUTPATIENT)
Dept: TRANSPLANT | Facility: CLINIC | Age: 60
End: 2020-08-19
Attending: INTERNAL MEDICINE
Payer: COMMERCIAL

## 2020-08-19 VITALS
DIASTOLIC BLOOD PRESSURE: 71 MMHG | OXYGEN SATURATION: 97 % | TEMPERATURE: 98.2 F | RESPIRATION RATE: 18 BRPM | BODY MASS INDEX: 25.17 KG/M2 | SYSTOLIC BLOOD PRESSURE: 115 MMHG | WEIGHT: 175.4 LBS | HEART RATE: 75 BPM

## 2020-08-19 DIAGNOSIS — Z94.81 STATUS POST BONE MARROW TRANSPLANT (H): ICD-10-CM

## 2020-08-19 LAB
ALBUMIN SERPL-MCNC: 3.7 G/DL (ref 3.4–5)
ALP SERPL-CCNC: 68 U/L (ref 40–150)
ALT SERPL W P-5'-P-CCNC: 29 U/L (ref 0–70)
ANION GAP SERPL CALCULATED.3IONS-SCNC: 5 MMOL/L (ref 3–14)
AST SERPL W P-5'-P-CCNC: 19 U/L (ref 0–45)
BASOPHILS # BLD AUTO: 0 10E9/L (ref 0–0.2)
BASOPHILS NFR BLD AUTO: 0.5 %
BILIRUB SERPL-MCNC: 0.4 MG/DL (ref 0.2–1.3)
BUN SERPL-MCNC: 20 MG/DL (ref 7–30)
CALCIUM SERPL-MCNC: 8.9 MG/DL (ref 8.5–10.1)
CHLORIDE SERPL-SCNC: 107 MMOL/L (ref 94–109)
CO2 SERPL-SCNC: 27 MMOL/L (ref 20–32)
CREAT SERPL-MCNC: 0.87 MG/DL (ref 0.66–1.25)
DIFFERENTIAL METHOD BLD: ABNORMAL
EOSINOPHIL # BLD AUTO: 0.2 10E9/L (ref 0–0.7)
EOSINOPHIL NFR BLD AUTO: 4.7 %
ERYTHROCYTE [DISTWIDTH] IN BLOOD BY AUTOMATED COUNT: 12.5 % (ref 10–15)
GFR SERPL CREATININE-BSD FRML MDRD: >90 ML/MIN/{1.73_M2}
GLUCOSE SERPL-MCNC: 91 MG/DL (ref 70–99)
HCT VFR BLD AUTO: 34.2 % (ref 40–53)
HGB BLD-MCNC: 11.4 G/DL (ref 13.3–17.7)
IMM GRANULOCYTES # BLD: 0 10E9/L (ref 0–0.4)
IMM GRANULOCYTES NFR BLD: 0.3 %
LYMPHOCYTES # BLD AUTO: 0.8 10E9/L (ref 0.8–5.3)
LYMPHOCYTES NFR BLD AUTO: 19.9 %
MAGNESIUM SERPL-MCNC: 2.2 MG/DL (ref 1.6–2.3)
MCH RBC QN AUTO: 35 PG (ref 26.5–33)
MCHC RBC AUTO-ENTMCNC: 33.3 G/DL (ref 31.5–36.5)
MCV RBC AUTO: 105 FL (ref 78–100)
MONOCYTES # BLD AUTO: 0.6 10E9/L (ref 0–1.3)
MONOCYTES NFR BLD AUTO: 16.3 %
NEUTROPHILS # BLD AUTO: 2.2 10E9/L (ref 1.6–8.3)
NEUTROPHILS NFR BLD AUTO: 58.3 %
NRBC # BLD AUTO: 0 10*3/UL
NRBC BLD AUTO-RTO: 0 /100
PLATELET # BLD AUTO: 79 10E9/L (ref 150–450)
POTASSIUM SERPL-SCNC: 4.3 MMOL/L (ref 3.4–5.3)
PROT SERPL-MCNC: 7.4 G/DL (ref 6.8–8.8)
RBC # BLD AUTO: 3.26 10E12/L (ref 4.4–5.9)
SODIUM SERPL-SCNC: 139 MMOL/L (ref 133–144)
WBC # BLD AUTO: 3.8 10E9/L (ref 4–11)

## 2020-08-19 PROCEDURE — 90723 DTAP-HEP B-IPV VACCINE IM: CPT | Mod: ZF | Performed by: INTERNAL MEDICINE

## 2020-08-19 PROCEDURE — 90472 IMMUNIZATION ADMIN EACH ADD: CPT

## 2020-08-19 PROCEDURE — G0463 HOSPITAL OUTPT CLINIC VISIT: HCPCS | Mod: ZF,25

## 2020-08-19 PROCEDURE — 90648 HIB PRP-T VACCINE 4 DOSE IM: CPT | Mod: ZF | Performed by: INTERNAL MEDICINE

## 2020-08-19 PROCEDURE — 80053 COMPREHEN METABOLIC PANEL: CPT | Performed by: NURSE PRACTITIONER

## 2020-08-19 PROCEDURE — 85025 COMPLETE CBC W/AUTO DIFF WBC: CPT | Performed by: NURSE PRACTITIONER

## 2020-08-19 PROCEDURE — 36415 COLL VENOUS BLD VENIPUNCTURE: CPT

## 2020-08-19 PROCEDURE — 25000581 ZZH RX MED A9270 GY (STAT IND- M) 250: Mod: ZF | Performed by: INTERNAL MEDICINE

## 2020-08-19 PROCEDURE — 90750 HZV VACC RECOMBINANT IM: CPT | Mod: ZF | Performed by: INTERNAL MEDICINE

## 2020-08-19 PROCEDURE — 90670 PCV13 VACCINE IM: CPT | Mod: ZF | Performed by: INTERNAL MEDICINE

## 2020-08-19 PROCEDURE — G0009 ADMIN PNEUMOCOCCAL VACCINE: HCPCS

## 2020-08-19 PROCEDURE — 83735 ASSAY OF MAGNESIUM: CPT | Performed by: NURSE PRACTITIONER

## 2020-08-19 PROCEDURE — G0463 HOSPITAL OUTPT CLINIC VISIT: HCPCS | Mod: ZF

## 2020-08-19 PROCEDURE — 25000128 H RX IP 250 OP 636: Mod: ZF | Performed by: INTERNAL MEDICINE

## 2020-08-19 RX ADMIN — PNEUMOCOCCAL 13-VALENT CONJUGATE VACCINE 0.5 ML: 2.2; 2.2; 2.2; 2.2; 2.2; 4.4; 2.2; 2.2; 2.2; 2.2; 2.2; 2.2; 2.2 INJECTION, SUSPENSION INTRAMUSCULAR at 13:14

## 2020-08-19 RX ADMIN — HAEMOPHILUS B POLYSACCHARIDE CONJUGATE VACCINE FOR INJ 0.5 ML: RECON SOLN at 13:15

## 2020-08-19 RX ADMIN — ZOSTER VACCINE RECOMBINANT, ADJUVANTED 0.5 ML: KIT at 13:15

## 2020-08-19 RX ADMIN — DIPHTHERIA AND TETANUS TOXOIDS AND ACELLULAR PERTUSSIS ADSORBED, HEPATITIS B (RECOMBINANT) AND INACTIVATED POLIOVIRUS VACCINE COMBINED 0.5 ML: 25; 10; 25; 25; 8; 10; 40; 8; 32 INJECTION, SUSPENSION INTRAMUSCULAR at 13:13

## 2020-08-19 ASSESSMENT — PAIN SCALES - GENERAL: PAINLEVEL: NO PAIN (0)

## 2020-08-19 NOTE — NURSING NOTE
Chief Complaint   Patient presents with     Blood Draw     labs drawn via venipuncture by RN in lab     /71 (BP Location: Left arm, Patient Position: Chair, Cuff Size: Adult Regular)   Pulse 75   Temp 98.2  F (36.8  C) (Oral)   Resp 18   Wt 79.6 kg (175 lb 6.4 oz)   SpO2 97%   BMI 25.17 kg/m      Labs collected and sent from left antecubital venipuncture in lab by RN. Pt tolerated well.   Pt checked in for next appointment.    Maria Teresa Bourgeois RN

## 2020-08-19 NOTE — NURSING NOTE
"Oncology Rooming Note    August 19, 2020 12:35 PM   Angel Yanez is a 60 year old male who presents for:    Chief Complaint   Patient presents with     Blood Draw     labs drawn via venipuncture by RN in lab     Oncology Clinic Visit     Return; Multiple Myeloma     Initial Vitals: /71 (BP Location: Left arm, Patient Position: Chair, Cuff Size: Adult Regular)   Pulse 75   Temp 98.2  F (36.8  C) (Oral)   Resp 18   Wt 79.6 kg (175 lb 6.4 oz)   SpO2 97%   BMI 25.17 kg/m   Estimated body mass index is 25.17 kg/m  as calculated from the following:    Height as of 7/29/20: 1.778 m (5' 10\").    Weight as of this encounter: 79.6 kg (175 lb 6.4 oz). Body surface area is 1.98 meters squared.  No Pain (0) Comment: Data Unavailable   No LMP for male patient.  Allergies reviewed: Yes  Medications reviewed: Yes    Medications: Medication refills not needed today.  Pharmacy name entered into EPIC:    [facility] - [place] - [address] [facility] - [place], [place] - [address] [facility]    Clinical concerns: No new concerns.       Rosangela Tran CMA              "

## 2020-08-19 NOTE — LETTER
8/19/2020     RE: Angel Yanez  [address]  [place] [facility] [address]    Dear Colleague,    Thank you for referring your patient, Angel Yanez, to the [facility] BLOOD AND MARROW TRANSPLANT. Please see a copy of my visit note below.    BMT Clinic Note    Patient ID: Angel Yanez is a 58 yo man 1 year and 2 mo s/p 2nd autologous transplant for MM.     Interval history:     Guille returns for follow up.Feeling good.  No complaints      ROS: 8 point ROS neg unless specified above.       Physical Exam:   Blood pressure 115/71, pulse 75, temperature 98.2  F (36.8  C), temperature source Oral, resp. rate 18, weight 79.6 kg (175 lb 6.4 oz), SpO2 97 %.      General: NAD  HEENT:FARRAH.  Sclera anicteric.  OP clear, buccal mucosa moist  Lungs: CTAB  Cardiovascular: RRR  GI/Abdomen: +BS, soft, nontender, non-distended  Skin: No rashes or petechaie.    MSK/Extremities: Warm, well perfused. No edema  Neurologic: alert, and conversant    Data:     Lab Results   Component Value Date    WBC 3.8 (L) 08/19/2020    ANEU 2.2 08/19/2020    HGB 11.4 (L) 08/19/2020    HCT 34.2 (L) 08/19/2020    PLT 79 (L) 08/19/2020     08/19/2020    POTASSIUM 4.3 08/19/2020    CHLORIDE 107 08/19/2020    CO2 27 08/19/2020    GLC 91 08/19/2020    BUN 20 08/19/2020    CR 0.87 08/19/2020    MAG 2.2 08/19/2020    INR 1.03 04/30/2020    BILITOTAL 0.4 08/19/2020    AST 19 08/19/2020    ALT 29 08/19/2020    ALKPHOS 68 08/19/2020    PROTTOTAL 7.4 08/19/2020    ALBUMIN 3.7 08/19/2020       Assessment and Plan:     Angel Yanez is a 58 yo man 1 year and 2 mo s/p 2nd autologous transplant for MM with ongoing cytopenias.     1.  BMT/MM:   Slow engraftment; cell dose was only 0.639x10^6. ([facility] [place] - known poor cell dose as failed chemo-mobilization 1/2019.)   - day +180 bone marrow biopsy 11/6/19 which showed no morphologic or immunophenotypic evidence of plasma cell neoplasm. His light chains were not elevated and he had no monoclonal protein  in the serum.  He is in clinical remission.  - PET in 8/2019 clear.   - 1 year bmbx See [facility]'s complete note: no monoclonal protein (his disease was originally IgA kappa) and other chemistry studies showed no signs of residual disease. An x-ray bone survey shows no change from 1 year earlier and a PET CT scan shows no hypermetabolic lesions except the right anterior third rib... It does not look to be a myeloma lesion but rather a subacute rib fracture with hypermetabolic signs of healing. Myeloma in remission, however marrow is hypocellular.    2.  HEME: Keep Hgb>8g/dL, plt >10k.   - Persistant profound thrombocytopenia. Nplate started 3/19 with some improvement.  Plt reached a plateau on max dose N plate.  Last dose 7/14.  Will not dose today & recheck in 1 week.  If plt still downtrending then will likely give N plate  - Previous trial of promacta not helpful. Stopped 12/6/19. Trial of prednisone not helpful, Pred taper complete on 4/16.      3.  ID: afebrile   - Hx FUOs: Extensive ID work up- no etiology identified.   - Given 1 year vaccines 6/4/20, Boosters today    Prophy: ACV, Fluconazole   - influenza vaccination given 11/26/19     4. CV: HTN.  On lisinopril 10mg daily     5.  FEN/Renal: Cr and lytes WNL     6.  Mood: Continue Paxil.     7. FUOs - Some kind of auto immune process DDX: sarcoidosis? Underwent EBUS FNA which was negative for granulomas however unlikely you could see this on just fluid aspirate. FUOs resolved with steroids and has not recurred.       RTC 2 weeks      Karla Manzo

## 2020-08-19 NOTE — NURSING NOTE
Pt received his Vaccines today:  Pedirex  HIB  Pneumoccal  Shingrix    Vaccine information supplied.   Pt tolerated these well.     Shoshana Salazar MA

## 2020-09-01 NOTE — PROGRESS NOTES
BMT Clinic Note    Patient ID: Angel Yanez is a 58 yo man 1 year and 3 mo s/p 2nd autologous transplant for MM.     Interval history:     Guille returns for follow up.Feeling good.  No complaints      ROS: 8 point ROS neg unless specified above.       Physical Exam:   Blood pressure 135/76, pulse 65, temperature 98  F (36.7  C), temperature source Oral, resp. rate 16, weight 80 kg (176 lb 4.8 oz), SpO2 99 %.    General: NAD  HEENT:FARRAH.  Sclera anicteric.  OP clear, buccal mucosa moist  Lungs: CTAB  Cardiovascular: RRR  GI/Abdomen: +BS, soft, nontender, non-distended  Skin: No rashes or petechaie.    MSK/Extremities: Warm, well perfused. No edema  Neurologic: alert, and conversant    Data:     Lab Results   Component Value Date    WBC 3.8 (L) 09/02/2020    ANEU 2.1 09/02/2020    HGB 11.5 (L) 09/02/2020    HCT 34.5 (L) 09/02/2020    PLT 75 (L) 09/02/2020     09/02/2020    POTASSIUM 4.1 09/02/2020    CHLORIDE 108 09/02/2020    CO2 26 09/02/2020    GLC 87 09/02/2020    BUN 16 09/02/2020    CR 0.82 09/02/2020    MAG 2.1 09/02/2020    INR 1.03 04/30/2020    BILITOTAL 0.3 09/02/2020    AST 20 09/02/2020    ALT 34 09/02/2020    ALKPHOS 69 09/02/2020    PROTTOTAL 7.2 09/02/2020    ALBUMIN 3.7 09/02/2020       Assessment and Plan:     Angel Yanez is a 58 yo man 1 year and 3 mo s/p 2nd autologous transplant for MM with ongoing cytopenias.     1.  BMT/MM:   Slow engraftment; cell dose was only 0.639x10^6. (Marrow [place] - known poor cell dose as failed chemo-mobilization 1/2019.)   - day +180 bone marrow biopsy 11/6/19 which showed no morphologic or immunophenotypic evidence of plasma cell neoplasm. His light chains were not elevated and he had no monoclonal protein in the serum.  He is in clinical remission.  - PET in 8/2019 clear.   - 1 year bmbx See [facility]'s complete note: no monoclonal protein (his disease was originally IgA kappa) and other chemistry studies showed no signs of residual disease. An x-ray  bone survey shows no change from 1 year earlier and a PET CT scan shows no hypermetabolic lesions except the right anterior third rib... It does not look to be a myeloma lesion but rather a subacute rib fracture with hypermetabolic signs of healing. Myeloma in remission, however marrow is hypocellular.    2.  HEME: Keep Hgb>8g/dL, plt >10k.   - Persistant profound thrombocytopenia. Nplate started 3/19 with some improvement.  Plt reached a plateau on max dose N plate.  Last dose 7/14.  Plt down to 75 today, will give a dose of Nplate 10mcg/kg today  - Previous trial of promacta not helpful. Stopped 12/6/19. Trial of prednisone not helpful, Pred taper complete on 4/16.      3.  ID: afebrile   - Hx FUOs: Extensive ID work up- no etiology identified.   - Given 1 year vaccines 6/4/20, Boosters given 8/2020    Prophy: ACV, Fluconazole   - influenza vaccination given 11/26/19     4. CV: HTN.  On lisinopril 10mg daily     5.  FEN/Renal: Cr and lytes WNL     6.  Mood: Continue Paxil.     7. FUOs - Some kind of auto immune process DDX: sarcoidosis? Underwent EBUS FNA which was negative for granulomas however unlikely you could see this on just fluid aspirate. FUOs resolved with steroids and has not recurred.       RTC 2 weeks      Karla Manzo

## 2020-09-02 ENCOUNTER — ONCOLOGY VISIT (OUTPATIENT)
Dept: TRANSPLANT | Facility: CLINIC | Age: 60
End: 2020-09-02
Attending: INTERNAL MEDICINE
Payer: COMMERCIAL

## 2020-09-02 ENCOUNTER — APPOINTMENT (OUTPATIENT)
Dept: LAB | Facility: CLINIC | Age: 60
End: 2020-09-02
Attending: INTERNAL MEDICINE
Payer: COMMERCIAL

## 2020-09-02 VITALS
HEART RATE: 65 BPM | TEMPERATURE: 98 F | RESPIRATION RATE: 16 BRPM | BODY MASS INDEX: 25.3 KG/M2 | DIASTOLIC BLOOD PRESSURE: 76 MMHG | SYSTOLIC BLOOD PRESSURE: 135 MMHG | WEIGHT: 176.3 LBS | OXYGEN SATURATION: 99 %

## 2020-09-02 DIAGNOSIS — Z94.81 STATUS POST BONE MARROW TRANSPLANT (H): Primary | ICD-10-CM

## 2020-09-02 DIAGNOSIS — D69.6 THROMBOCYTOPENIA (H): ICD-10-CM

## 2020-09-02 LAB
ALBUMIN SERPL-MCNC: 3.7 G/DL (ref 3.4–5)
ALP SERPL-CCNC: 69 U/L (ref 40–150)
ALT SERPL W P-5'-P-CCNC: 34 U/L (ref 0–70)
ANION GAP SERPL CALCULATED.3IONS-SCNC: 6 MMOL/L (ref 3–14)
AST SERPL W P-5'-P-CCNC: 20 U/L (ref 0–45)
BASOPHILS # BLD AUTO: 0 10E9/L (ref 0–0.2)
BASOPHILS NFR BLD AUTO: 0.5 %
BILIRUB SERPL-MCNC: 0.3 MG/DL (ref 0.2–1.3)
BUN SERPL-MCNC: 16 MG/DL (ref 7–30)
CALCIUM SERPL-MCNC: 8.8 MG/DL (ref 8.5–10.1)
CHLORIDE SERPL-SCNC: 108 MMOL/L (ref 94–109)
CO2 SERPL-SCNC: 26 MMOL/L (ref 20–32)
CREAT SERPL-MCNC: 0.82 MG/DL (ref 0.66–1.25)
DIFFERENTIAL METHOD BLD: ABNORMAL
EOSINOPHIL # BLD AUTO: 0.2 10E9/L (ref 0–0.7)
EOSINOPHIL NFR BLD AUTO: 5.8 %
ERYTHROCYTE [DISTWIDTH] IN BLOOD BY AUTOMATED COUNT: 13.2 % (ref 10–15)
GFR SERPL CREATININE-BSD FRML MDRD: >90 ML/MIN/{1.73_M2}
GLUCOSE SERPL-MCNC: 87 MG/DL (ref 70–99)
HCT VFR BLD AUTO: 34.5 % (ref 40–53)
HGB BLD-MCNC: 11.5 G/DL (ref 13.3–17.7)
IMM GRANULOCYTES # BLD: 0 10E9/L (ref 0–0.4)
IMM GRANULOCYTES NFR BLD: 0.3 %
LYMPHOCYTES # BLD AUTO: 0.8 10E9/L (ref 0.8–5.3)
LYMPHOCYTES NFR BLD AUTO: 21.1 %
MAGNESIUM SERPL-MCNC: 2.1 MG/DL (ref 1.6–2.3)
MCH RBC QN AUTO: 34.6 PG (ref 26.5–33)
MCHC RBC AUTO-ENTMCNC: 33.3 G/DL (ref 31.5–36.5)
MCV RBC AUTO: 104 FL (ref 78–100)
MONOCYTES # BLD AUTO: 0.6 10E9/L (ref 0–1.3)
MONOCYTES NFR BLD AUTO: 16.6 %
NEUTROPHILS # BLD AUTO: 2.1 10E9/L (ref 1.6–8.3)
NEUTROPHILS NFR BLD AUTO: 55.7 %
NRBC # BLD AUTO: 0 10*3/UL
NRBC BLD AUTO-RTO: 0 /100
PLATELET # BLD AUTO: 75 10E9/L (ref 150–450)
POTASSIUM SERPL-SCNC: 4.1 MMOL/L (ref 3.4–5.3)
PROT SERPL-MCNC: 7.2 G/DL (ref 6.8–8.8)
RBC # BLD AUTO: 3.32 10E12/L (ref 4.4–5.9)
SODIUM SERPL-SCNC: 140 MMOL/L (ref 133–144)
WBC # BLD AUTO: 3.8 10E9/L (ref 4–11)

## 2020-09-02 PROCEDURE — 83735 ASSAY OF MAGNESIUM: CPT | Performed by: NURSE PRACTITIONER

## 2020-09-02 PROCEDURE — 96372 THER/PROPH/DIAG INJ SC/IM: CPT | Mod: ZF

## 2020-09-02 PROCEDURE — 36415 COLL VENOUS BLD VENIPUNCTURE: CPT

## 2020-09-02 PROCEDURE — 80053 COMPREHEN METABOLIC PANEL: CPT | Performed by: NURSE PRACTITIONER

## 2020-09-02 PROCEDURE — 25000128 H RX IP 250 OP 636: Mod: ZF | Performed by: INTERNAL MEDICINE

## 2020-09-02 PROCEDURE — 85025 COMPLETE CBC W/AUTO DIFF WBC: CPT | Performed by: NURSE PRACTITIONER

## 2020-09-02 PROCEDURE — G0463 HOSPITAL OUTPT CLINIC VISIT: HCPCS | Mod: 25

## 2020-09-02 RX ADMIN — ROMIPLOSTIM 750 MCG: 500 INJECTION, POWDER, LYOPHILIZED, FOR SOLUTION SUBCUTANEOUS at 12:26

## 2020-09-02 ASSESSMENT — PAIN SCALES - GENERAL: PAINLEVEL: NO PAIN (0)

## 2020-09-02 NOTE — NURSING NOTE
Patient was administered NPLATE 750 mcg in lower right abdomen. Patient tolerated injection well without any incident.  Bandar Wood MA on 9/2/2020 at 12:29 PM

## 2020-09-02 NOTE — LETTER
9/2/2020         RE: Angel Yanez  [address]  [place] [facility] [address]        Dear Colleague,    Thank you for referring your patient, Angel Yanez, to the [facility] BLOOD AND MARROW TRANSPLANT. Please see a copy of my visit note below.    BMT Clinic Note    Patient ID: Angel Yanez is a 58 yo man 1 year and 3 mo s/p 2nd autologous transplant for MM.     Interval history:     Guille returns for follow up.Feeling good.  No complaints      ROS: 8 point ROS neg unless specified above.       Physical Exam:   Blood pressure 135/76, pulse 65, temperature 98  F (36.7  C), temperature source Oral, resp. rate 16, weight 80 kg (176 lb 4.8 oz), SpO2 99 %.    General: NAD  HEENT:FARRAH.  Sclera anicteric.  OP clear, buccal mucosa moist  Lungs: CTAB  Cardiovascular: RRR  GI/Abdomen: +BS, soft, nontender, non-distended  Skin: No rashes or petechaie.    MSK/Extremities: Warm, well perfused. No edema  Neurologic: alert, and conversant    Data:     Lab Results   Component Value Date    WBC 3.8 (L) 09/02/2020    ANEU 2.1 09/02/2020    HGB 11.5 (L) 09/02/2020    HCT 34.5 (L) 09/02/2020    PLT 75 (L) 09/02/2020     09/02/2020    POTASSIUM 4.1 09/02/2020    CHLORIDE 108 09/02/2020    CO2 26 09/02/2020    GLC 87 09/02/2020    BUN 16 09/02/2020    CR 0.82 09/02/2020    MAG 2.1 09/02/2020    INR 1.03 04/30/2020    BILITOTAL 0.3 09/02/2020    AST 20 09/02/2020    ALT 34 09/02/2020    ALKPHOS 69 09/02/2020    PROTTOTAL 7.2 09/02/2020    ALBUMIN 3.7 09/02/2020       Assessment and Plan:     Angel Yanez is a 58 yo man 1 year and 3 mo s/p 2nd autologous transplant for MM with ongoing cytopenias.     1.  BMT/MM:   Slow engraftment; cell dose was only 0.639x10^6. (Marrow [place] - known poor cell dose as failed chemo-mobilization 1/2019.)   - day +180 bone marrow biopsy 11/6/19 which showed no morphologic or immunophenotypic evidence of plasma cell neoplasm. His light chains were not elevated and he had no monoclonal  protein in the serum.  He is in clinical remission.  - PET in 8/2019 clear.   - 1 year bmbx See [facility]'s complete note: no monoclonal protein (his disease was originally IgA kappa) and other chemistry studies showed no signs of residual disease. An x-ray bone survey shows no change from 1 year earlier and a PET CT scan shows no hypermetabolic lesions except the right anterior third rib... It does not look to be a myeloma lesion but rather a subacute rib fracture with hypermetabolic signs of healing. Myeloma in remission, however marrow is hypocellular.    2.  HEME: Keep Hgb>8g/dL, plt >10k.   - Persistant profound thrombocytopenia. Nplate started 3/19 with some improvement.  Plt reached a plateau on max dose N plate.  Last dose 7/14.  Plt down to 75 today, will give a dose of Nplate 10mcg/kg today  - Previous trial of promacta not helpful. Stopped 12/6/19. Trial of prednisone not helpful, Pred taper complete on 4/16.      3.  ID: afebrile   - Hx FUOs: Extensive ID work up- no etiology identified.   - Given 1 year vaccines 6/4/20, Boosters given 8/2020    Prophy: ACV, Fluconazole   - influenza vaccination given 11/26/19     4. CV: HTN.  On lisinopril 10mg daily     5.  FEN/Renal: Cr and lytes WNL     6.  Mood: Continue Paxil.     7. FUOs - Some kind of auto immune process DDX: sarcoidosis? Underwent EBUS FNA which was negative for granulomas however unlikely you could see this on just fluid aspirate. FUOs resolved with steroids and has not recurred.       RTC 2 weeks      Karla Manzo

## 2020-09-02 NOTE — NURSING NOTE
"Oncology Rooming Note    September 2, 2020 11:53 AM   Angel Yanez is a 60 year old male who presents for:    Chief Complaint   Patient presents with     Blood Draw     labs drawn by venipuncture by rn in lab.  vital signs taken.     RECHECK     Multiple Myeloma      Initial Vitals: /76 (BP Location: Right arm, Patient Position: Sitting, Cuff Size: Adult Regular)   Pulse 65   Temp 98  F (36.7  C) (Oral)   Resp 16   Wt 80 kg (176 lb 4.8 oz)   SpO2 99%   BMI 25.30 kg/m   Estimated body mass index is 25.3 kg/m  as calculated from the following:    Height as of 7/29/20: 1.778 m (5' 10\").    Weight as of this encounter: 80 kg (176 lb 4.8 oz). Body surface area is 1.99 meters squared.  No Pain (0) Comment: Data Unavailable   No LMP for male patient.  Allergies reviewed: Yes  Medications reviewed: Yes    Medications: Medication refills not needed today.  Pharmacy name entered into EPIC:    [facility] [address]  [facility] - [place], [place] - [address]    Clinical concerns: None       Kathy Perez CMA              "

## 2020-09-02 NOTE — NURSING NOTE
Chief Complaint   Patient presents with     Blood Draw     labs drawn by venipuncture by rn in lab.  vital signs taken.     Labs drawn by venipuncture by RN in lab.  Vital signs taken.  Pt checked in to next appointment.    Caitlin Webster RN

## 2020-09-06 NOTE — NURSING NOTE
Chief Complaint   Patient presents with     Blood Draw     Labs drawn via  by RN in lab. VS taken. Patient checked in for next appt.     Infusion     Platelet transfusion, hx MM s/p transplant.        OB Progress Note:  Delivery, POD#1    S: 39yo POD#1 s/p LTCS . Her pain is well controlled. She is tolerating clears, not yet passing flatus. Denies N/V. Denies CP/SOB/lightheadedness/dizziness.   Not yet OOB. Dwyer still in place.       O:   Vital Signs Last 24 Hrs  T(C): 36.7 (06 Sep 2020 00:47), Max: 37 (05 Sep 2020 10:23)  T(F): 98 (06 Sep 2020 00:47), Max: 98.6 (05 Sep 2020 10:23)  HR: 62 (06 Sep 2020 00:47) (54 - 70)  BP: 127/71 (06 Sep 2020 00:47) (110/68 - 136/73)  BP(mean): 97 (05 Sep 2020 23:00) (75 - 99)  RR: 18 (06 Sep 2020 00:47) (14 - 19)  SpO2: 100% (06 Sep 2020 00:47) (98% - 100%)    Labs:  Blood type: B Positive  Rubella IgG: RPR: Negative                          11.5   12.58<H> >-----------< 168    ( 05 @ 05:45 )             36.6                  PE:  General: NAD  Abdomen: Mildly distended, appropriately tender, incision c/d/i.  Extremities: No erythema, no pitting edema

## 2020-09-08 NOTE — PROGRESS NOTES
Attempted to reach patient's granddaughter Cheri.  Permission to speak to Cheri documented in Epic.  No answer. Left message for Cheri to return call to the clinic.     See below note from Dr Gabriel.     BMT Daily Progress Note   12/17/2019     Patient ID:  Angel Yanez is a 60 yo man D+210 s/p 2nd autologous transplant for MM.   S/p 2 readmissions for FUO (10/16-10/23; 9/23-9/29/19). Readmitted on 12/9/2019 with return of fevers despite celebrex, FARZANA, worsening sob and return of rash         Diagnosis MM Multiple myeloma  HCT Type Autologous    Prep Regimen Cytoxan  Melphalan   Donor Source No data was found    GVHD Prophylaxis No  [facility]      Angel was admitted on 12/9/2019 for FARZANA and increased sob with chronic cough.     INTERVAL  HISTORY   Fevers have resolved after starting prednisone.  Guille notes he feels the best he has in several weeks. He notes that Ebus biopsy actually went really well- denies any sore throat. Thrilled that his plts responded so nicely yesterday. Denies any blood tinged sputum or cough.      Review of Systems: 10 point ROS negative except as noted above.        PHYSICAL EXAM      Weight In/Out          Wt Readings from Last 3 Encounters:   12/11/19 81.4 kg (179 lb 8 oz)   12/09/19 78.3 kg (172 lb 11.2 oz)   12/06/19 76.8 kg (169 lb 4.8 oz)        I/O last 3 completed shifts:  In: 1477 [P.O.:700; I.V.:200]  Out: 1500 [Urine:1500]      Blood pressure (!) 164/92, pulse 89, temperature 97.7  F (36.5  C), temperature source Oral, resp. rate 16, weight 75.9 kg (167 lb 6.4 oz), SpO2 99 %.    General: NAD   Eyes: : FARRAH, sclera anicteric   Nose/Mouth/Throat: OP clear, buccal mucosa moist, no ulcerations   Lungs:  diminished bases  Cardiovascular: RRR, no M/R/G   Abdominal/Rectal: +BS, soft, NT, ND, No HSM   Lymphatics: no edema  Skin: no  Petechaie, Non-confluent macular pink rash on chest and back, few on upper thighs  Neuro: A&O   Additional Findings: Peripheral IV        LABS AND IMAGING - PAST 24 HOURS     Lab Results   Component Value Date    WBC 3.5 (L) 12/16/2019    ANEU 1.8 12/15/2019    HGB 9.4 (L) 12/16/2019    HCT 28.5 (L) 12/16/2019    PLT 70 (L) 12/16/2019      12/13/2019    POTASSIUM 3.4 12/13/2019    CHLORIDE 107 12/13/2019    CO2 27 12/13/2019     (H) 12/13/2019    BUN 32 (H) 12/13/2019    CR 0.79 12/13/2019    MAG 2.1 11/26/2019    INR 1.53 (H) 12/09/2019    BILITOTAL 1.5 (H) 12/13/2019    AST 5 12/13/2019    ALT 21 12/13/2019    ALKPHOS 261 (H) 12/13/2019    PROTTOTAL 5.3 (L) 12/13/2019    ALBUMIN 2.3 (L) 12/13/2019        OVERALL PLAN   Angel Yanez is a 60 yo man D+210 s/p 2nd autologous transplant for MM.   S/p 2 readmissions for FUO (10/16-10/23; 9/23-9/29/19).      1.  BMT/MM:   Slow engraftment; cell dose was only 0.639x10^6. (Marrow [place] - known poor cell dose as failed chemo-mobilization 1/2019.)   - day +180 bone marrow biopsy 11/6/19 which showed no morphologic or immunophenotypic evidence of plasma cell neoplasm. His light chains were not elevated and he had no monoclonal protein in the serum.  He is in clinical remission.  - PET in 8/2019 clear.   - PB FLOW 11/23: No overt aberrant immunophenotype on T cells.  Rare to absent mature B cells and plasma cells.      2.  HEME: Keep Hgb>8g/dL, plt >10 .  -Remains transfusions dependent. Had planned on doing any EBUS of mediastinal LN on 12/11/2019 but could not get platelets high enough( 50,000) to do in endoscopy. Plts today and preorder for Sunday     Last GCSF on 12/13/2019 Give prn.   -Pancytopenia post BMT, low cell dose, +/- infection.   - No evidence of HLH on lymphnode bx 10/18/19.     - Persistent thrombocytopenia: BM 11/6/19 showed adequate granulopoiesis and erythropoiesis but his platelet precursors were 0 to absent which is reflected in peripheral CBC. Started promactic 50mcg daily on 11/10. Has been titrated up to 150mg daily (started 11/23); no change. Promacta stopped 12/6/2019 as not helping.  ( Guille actually several days before as he ran out of this)  - Hemolysis eval neg on 11/10/2019      3.  ID: Afebrile since starting pred  - 12/13 EBUS bx pending (many infectious  cultures repeated)- No actual tissue obtained - confirmed gross room did not receive specimen. (FNA only - cytology on this).   -12/9 RVP=neg  -12/9 CT chest is stable with GGO better. No antibiotics given during admission because do not feel like fevers are infection.  Hx FUO  A.) Extensive ID work up unremarkable  sept 2019 and oct and again Nov. 10/16 and 11/9 repeat chest CT: persistent mediastinal adenopathy no paranchymal disease. S/P bx of L axillary node on 10/18: No evidence of HLH, tissues looks reactive.   - s/p excisional R axillary lymph node bx 11/15/19 (neg core bx 10/18 L axillary). Bx sent to [facility] 11/26 -Called surg path and they faxed copy of LN biopsy consult from [facility]- reviewed report with Dr Mukherjee 12/10.  No definitive diagnosis.  - HTLV neg with very low lymphocyte count. CD4 count 146.  - Per ID with negative w/u and not neutropenic discontinued cefepime 11/12. 11/15 CT Chest new GGO.  RVP neg. Pulm consulted. No indication for BAL or further treatment/testing. Completed a zpak 11/21.  Saw ID outpatient on 12/3/2019: see note.  - Per ID, possible histoplasmosis: empiric itraconazole 200 TID started  with fevers initially resolved but then recurred; admitted on bid dosing. Stopped itraconazole with elevated LFT and recurrence of fever. 10/18 Histo/blasto urine antigen negative. Per Dr Kelley flucaonzole can suppress growth? 10/18 Kairus DNA test- negative. 1/12 itra level=1.6.    Right LNTissue culture (11/15): positive for stenotrophomonas malophilia and Ochrobactrum anthropi from broth only. Sensitive to bactrim and levaquin. Given culture + in broth only, this is likely contaminant but given FUO treated empirically. Was on  Levaquin 750mg daily 11/26, stopped 12/6 as no impact to fever trends.   - As per ID's note 12/3 and after discussion with Dr Lucio, Stopped itraconazole on 12/9 as no impact to fever trend (could also be potentiation FARZANA offending medications)  B.) Autoimmune workup so  far negative. Rheum consult, last note on 11/14: felt no evidence of autoimmune disease flair neg studies above but recommended evaluate possible granulomatous finding with lymph nose biopsy to r/o sarcoidosis in the setting of FUO and elevated soluble IL-2 RA--  C.) Malignancy W/U: Neg BM BX 11/6. Neg PET in 8/2019. Neg core lymph node needle bx 10/18. PSA neg. Admit CT with R kidney hypoenhancing ill-defined focus. MRI 11/13 not suggestive of urothelial or renal cell carcinoma.   Prophy: Held acyclovir due to FARZANA on admission but restarted and discharged on acyclovir.  Remains on pentamidine (12/17). See repeat t cell subset, Absolute [id number].  Started fluconazole with prednisone but Dr Mukherjee did not feel like levaquin was warranted.    - influenza vaccination given 11/26/19          4. Pulm:  Still gets somewhat SOB with activity- but improved with resolution of fevers and down 15 lbs fluid since hospital discharge.  - 10/17: Echo with preserved EF, no evidence of right heart strain. Repeat echo 11/14 stable.  - 10/17 VQ scan neg for PE     5. GI:  no Complaints.      6.  FEN/Renal:   Recent FARZANA with fevers, celebrex Cr peaked FARZANA to 2.57.  Normalized 0.68     7.  Mood: Continue Paxil.     8. Derm:   - recent rash on torso that has returned.Guille says this is the same rash. Derm consuled. 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   Reemerged 12/9, resumed triamcinolone TID on torso- persistent rash.      9. Dispo:  After discussion with Dr Mukherjee, looking for common cause.Some kind of auto immune process DDX:  Sarcoidosis? Stable but increased LAP, splenomegaly with possible calcified nodules, prolonged cytopenias, high sIL2, and lack of deterioration over 3 months of no Dx argue against infection and cancer, and suggest autoimmunity-highest on list is. sarcoid. Asked pulmonary to biopsy a mediastinal node, will check  ACE=which is in the normal range, VitD3 very elevated and T cell subset panel does not show definative inversion of ratio.  pathology  reviewed his recent marrow for granulomas- subcortical so no good answer.Unable to get biopsy 12/11, not safe in endoscopy and unable to get platelets over 23 after 2 bags. Started treating  him with steroids today,prednisone 30 mg daily discussed with Dr shi by Dr Mukherjee.  - COntinue pred currently- seems like typically stay on steroids for ~1 yr. Unfortunately I do not believe they will be able to confirm sarcoidosis based on type of tissue (lack of tissue) obtained EBUS.       - RTC 12/20/19 for labs, provider visit and possible plts.   12/31 SHANNON Martin-C  [phone]

## 2020-09-10 ENCOUNTER — TELEPHONE (OUTPATIENT)
Dept: TRANSPLANT | Facility: CLINIC | Age: 60
End: 2020-09-10

## 2020-09-10 NOTE — TELEPHONE ENCOUNTER
Pt called experiencing symptoms, answered no to all COVID-19 screening questions. I informed pt that I will page an МАРИНА to give Guille a call @ [phone] and if pt does not get a call back within half an hour to call [phone] again. Pt agreed. МАРИНА paged.        Returned call. Pt reports itching starting yesterday and noticed new rash today on back and upper chest. Rash is red, raised. Not painful or vesicular. No fevers, rash. No throat tightness/itching or dyspnea. Stable chronic intermittent clear rhinorrhea. No new foods or meds. N-plate 1 week ago (has had multiple times since March of this year) and no injections site reaction. He is unsure of contact irritants. Did put on a leisure suit jacket yesterday that's been in his closet since last winter - had a shirt on underneath. See pic below. Pt states similar rash on upper chest. Spares extremities, face. Has TMC cream and will use tid. Take Claritin daily and Benadryl no and prn. If no improvement or worse tomorrow, call back for visit vs trial medrol dose pack for suspected contact dermatitis of unknown cause. To ED if dyspnea or other resp sx. Pt agrees.    Lana Reynolds PA-C  [phone]           DISPLAY PLAN FREE TEXT

## 2020-09-11 ENCOUNTER — TELEPHONE (OUTPATIENT)
Dept: TRANSPLANT | Facility: CLINIC | Age: 60
End: 2020-09-11

## 2020-09-11 DIAGNOSIS — Z94.81 STATUS POST BONE MARROW TRANSPLANT (H): Primary | ICD-10-CM

## 2020-09-11 RX ORDER — METHYLPREDNISOLONE 4 MG
TABLET, DOSE PACK ORAL
Qty: 21 TABLET | Refills: 0 | Status: SHIPPED | OUTPATIENT
Start: 2020-09-11 | End: 2020-12-30

## 2020-09-11 NOTE — TELEPHONE ENCOUNTER
Pt called experiencing symptoms, answered no to all COVID-19 screening questions. I informed pt that I will page an МАРИНА to give Guille a call @ [phone] and if pt does not get a call back within half an hour to call [phone] again. Pt agreed. МАРИНА paged.    Pt called clinic again today as instructed by Lana Reynolds yesterday.  He developed a red rash--very pruritic, worse on back--now confluent on back.  No vesicles or bullae.  Not painful.  No relief with topical TCM or oral antihistamines.  He otherwise feels well.  No fever or other stigmata of new illness.    Case discussed w/ Dr. Lucio (primary BMT physician).  We will try short course steroids (Medrol dosepack).  If he develops vesicles or fever or new medical complaints, he knows he needs to be sooner.  If not,will f/u next Wed as planned.    Darlin Coulter pa-c  [phone]

## 2020-09-14 ENCOUNTER — APPOINTMENT (OUTPATIENT)
Dept: LAB | Facility: CLINIC | Age: 60
End: 2020-09-14
Attending: PHYSICIAN ASSISTANT
Payer: COMMERCIAL

## 2020-09-14 ENCOUNTER — ONCOLOGY VISIT (OUTPATIENT)
Dept: TRANSPLANT | Facility: CLINIC | Age: 60
End: 2020-09-14
Attending: PHYSICIAN ASSISTANT
Payer: COMMERCIAL

## 2020-09-14 VITALS
DIASTOLIC BLOOD PRESSURE: 87 MMHG | OXYGEN SATURATION: 96 % | BODY MASS INDEX: 25.33 KG/M2 | RESPIRATION RATE: 18 BRPM | SYSTOLIC BLOOD PRESSURE: 145 MMHG | WEIGHT: 176.5 LBS | TEMPERATURE: 98.3 F | HEART RATE: 82 BPM

## 2020-09-14 DIAGNOSIS — Z94.81 STATUS POST BONE MARROW TRANSPLANT (H): Primary | ICD-10-CM

## 2020-09-14 DIAGNOSIS — R21 RASH AND NONSPECIFIC SKIN ERUPTION: ICD-10-CM

## 2020-09-14 LAB
ALBUMIN SERPL-MCNC: 3.9 G/DL (ref 3.4–5)
ALP SERPL-CCNC: 70 U/L (ref 40–150)
ALT SERPL W P-5'-P-CCNC: 29 U/L (ref 0–70)
ANION GAP SERPL CALCULATED.3IONS-SCNC: 7 MMOL/L (ref 3–14)
AST SERPL W P-5'-P-CCNC: 12 U/L (ref 0–45)
BASOPHILS # BLD AUTO: 0 10E9/L (ref 0–0.2)
BASOPHILS NFR BLD AUTO: 0.2 %
BILIRUB SERPL-MCNC: 0.4 MG/DL (ref 0.2–1.3)
BUN SERPL-MCNC: 18 MG/DL (ref 7–30)
CALCIUM SERPL-MCNC: 9.2 MG/DL (ref 8.5–10.1)
CHLORIDE SERPL-SCNC: 104 MMOL/L (ref 94–109)
CO2 SERPL-SCNC: 27 MMOL/L (ref 20–32)
CREAT SERPL-MCNC: 1.07 MG/DL (ref 0.66–1.25)
DIFFERENTIAL METHOD BLD: ABNORMAL
EOSINOPHIL # BLD AUTO: 0.4 10E9/L (ref 0–0.7)
EOSINOPHIL NFR BLD AUTO: 7.6 %
ERYTHROCYTE [DISTWIDTH] IN BLOOD BY AUTOMATED COUNT: 13.6 % (ref 10–15)
GFR SERPL CREATININE-BSD FRML MDRD: 74 ML/MIN/{1.73_M2}
GLUCOSE SERPL-MCNC: 107 MG/DL (ref 70–99)
HCT VFR BLD AUTO: 34.8 % (ref 40–53)
HGB BLD-MCNC: 11.7 G/DL (ref 13.3–17.7)
IMM GRANULOCYTES # BLD: 0 10E9/L (ref 0–0.4)
IMM GRANULOCYTES NFR BLD: 0.4 %
LYMPHOCYTES # BLD AUTO: 0.5 10E9/L (ref 0.8–5.3)
LYMPHOCYTES NFR BLD AUTO: 9.9 %
MAGNESIUM SERPL-MCNC: 2.1 MG/DL (ref 1.6–2.3)
MCH RBC QN AUTO: 34.6 PG (ref 26.5–33)
MCHC RBC AUTO-ENTMCNC: 33.6 G/DL (ref 31.5–36.5)
MCV RBC AUTO: 103 FL (ref 78–100)
MONOCYTES # BLD AUTO: 0.4 10E9/L (ref 0–1.3)
MONOCYTES NFR BLD AUTO: 8.3 %
NEUTROPHILS # BLD AUTO: 3.7 10E9/L (ref 1.6–8.3)
NEUTROPHILS NFR BLD AUTO: 73.6 %
NRBC # BLD AUTO: 0 10*3/UL
NRBC BLD AUTO-RTO: 0 /100
PLATELET # BLD AUTO: 56 10E9/L (ref 150–450)
POTASSIUM SERPL-SCNC: 4.3 MMOL/L (ref 3.4–5.3)
PROT SERPL-MCNC: 7.7 G/DL (ref 6.8–8.8)
RBC # BLD AUTO: 3.38 10E12/L (ref 4.4–5.9)
SODIUM SERPL-SCNC: 138 MMOL/L (ref 133–144)
WBC # BLD AUTO: 5 10E9/L (ref 4–11)

## 2020-09-14 PROCEDURE — 88305 TISSUE EXAM BY PATHOLOGIST: CPT | Mod: TC | Performed by: PHYSICIAN ASSISTANT

## 2020-09-14 PROCEDURE — G0463 HOSPITAL OUTPT CLINIC VISIT: HCPCS | Mod: 25

## 2020-09-14 PROCEDURE — 85025 COMPLETE CBC W/AUTO DIFF WBC: CPT | Performed by: NURSE PRACTITIONER

## 2020-09-14 PROCEDURE — 83735 ASSAY OF MAGNESIUM: CPT | Performed by: NURSE PRACTITIONER

## 2020-09-14 PROCEDURE — 36415 COLL VENOUS BLD VENIPUNCTURE: CPT

## 2020-09-14 PROCEDURE — 80053 COMPREHEN METABOLIC PANEL: CPT | Performed by: NURSE PRACTITIONER

## 2020-09-14 PROCEDURE — 11104 PUNCH BX SKIN SINGLE LESION: CPT

## 2020-09-14 RX ORDER — LIDOCAINE HYDROCHLORIDE AND EPINEPHRINE 10; 10 MG/ML; UG/ML
5 INJECTION, SOLUTION INFILTRATION; PERINEURAL ONCE
Status: DISCONTINUED | OUTPATIENT
Start: 2020-09-14 | End: 2020-09-14 | Stop reason: HOSPADM

## 2020-09-14 ASSESSMENT — PAIN SCALES - GENERAL: PAINLEVEL: NO PAIN (0)

## 2020-09-14 NOTE — NURSING NOTE
Chief Complaint   Patient presents with     Lab Only     venipuncture, vitals checked     Eloisa King RN on 9/14/2020 at 2:44 PM

## 2020-09-14 NOTE — PROGRESS NOTES
BMT Skin Biopsy Procedure Note  September 14, 2020 4:20 PM    Indications: Diffuse Rash (see other note for pictures)    After informed consent, including risks of procedure, infection and/or bleeding, patient was prepped in the usual sterile manner. Local anesthesia was given with 2 ml of 1% lidocaine with epi. A 4 mm punch biopsy was taken from right back/flank. 1 stitch were placed with a 4-0 prolene, nonabsorbable suture. Wound was dressed with triple antibiotic ointment and a bandaid.  Patient tolerated the procedure without complications. Patient given Biopsy Information pamphlet.      Fei Meng PA-C

## 2020-09-14 NOTE — LETTER
9/14/2020         RE: Angel Yanez  [address]  [facility] [address]        Dear Colleague,    Thank you for referring your patient, Angel Yanez, to the [facility] BLOOD AND MARROW TRANSPLANT. Please see a copy of my visit note below.    BMT Skin Biopsy Procedure Note  September 14, 2020 4:20 PM    Indications: Diffuse Rash (see other note for pictures)    After informed consent, including risks of procedure, infection and/or bleeding, patient was prepped in the usual sterile manner. Local anesthesia was given with 2 ml of 1% lidocaine with epi. A 4 mm punch biopsy was taken from right back/flank. 1 stitch were placed with a 4-0 prolene, nonabsorbable suture. Wound was dressed with triple antibiotic ointment and a bandaid.  Patient tolerated the procedure without complications. Patient given Biopsy Information pamphlet.      Fei Meng PA-C    Again, thank you for allowing me to participate in the care of your patient.        Sincerely,        BMT Advanced Practice Provider

## 2020-09-14 NOTE — PROGRESS NOTES
BMT Clinic Note    Patient ID: Angel Yanez is a 60 yo man 1 year and 3 mo s/p 2nd autologous transplant for MM.     Interval history:     Guille is seen for an add on visit for his rash. Rash started 9/9 on back and upper chest and was red and raised. He started TMC and rash then progressed. He was then started on medrol dose pack on 9/11. Since that time rash has continued to spread to the rest of his body and become more confluent erythema. It is not raised any more but is very itchy. No involvement of mucous membranes. No fevers, chills, infectious symptoms. No recent travel, hygiene products, or detergents. Went paddle boarding. but did not go swimming or get in the water. No new medications but notes that he did refill his paxil and lisinopril 8/27 and started taking them last week. When he picked up his lisinopril prescription it had a note saying that the medication may look different in color or shape. He confirms still taking this medication.     ROS: 8 point ROS neg unless specified above.       Physical Exam:   Blood pressure (!) 145/87, pulse 82, temperature 98.3  F (36.8  C), resp. rate 18, weight 80.1 kg (176 lb 8 oz), SpO2 96 %.    General: NAD  HEENT:FARRAH.  Sclera anicteric.  OP clear, buccal mucosa moist  Lungs: breathing comfortably on room air, no stridor.   Skin: Diffuse erythema/macular rash with large areas of confluent erythema from the shoulders down. Spares the palms and does not involve the mouth. Rash appears to be clearing along upper back.  (see pics below)  MSK/Extremities: Warm, well perfused. No edema  Neurologic: alert, and conversant    9/14/20:           Data:     Lab Results   Component Value Date    WBC 5.0 09/14/2020    ANEU 3.7 09/14/2020    HGB 11.7 (L) 09/14/2020    HCT 34.8 (L) 09/14/2020    PLT 56 (L) 09/14/2020     09/02/2020    POTASSIUM 4.1 09/02/2020    CHLORIDE 108 09/02/2020    CO2 26 09/02/2020    GLC 87 09/02/2020    BUN 16 09/02/2020    CR 0.82 09/02/2020     MAG 2.1 09/02/2020    INR 1.03 04/30/2020    BILITOTAL 0.3 09/02/2020    AST 20 09/02/2020    ALT 34 09/02/2020    ALKPHOS 69 09/02/2020    PROTTOTAL 7.2 09/02/2020    ALBUMIN 3.7 09/02/2020       Assessment and Plan:     Angel Yanez is a 58 yo man 1 year and 3 mo s/p 2nd autologous transplant for MM with ongoing cytopenias.     1.  BMT/MM:   Slow engraftment; cell dose was only 0.639x10^6. ([facility] [place] - known poor cell dose as failed chemo-mobilization 1/2019.)   - day +180 bone marrow biopsy 11/6/19 which showed no morphologic or immunophenotypic evidence of plasma cell neoplasm. His light chains were not elevated and he had no monoclonal protein in the serum.  He is in clinical remission.  - PET in 8/2019 clear.   - 1 year bmbx See [facility]'s complete note: no monoclonal protein (his disease was originally IgA kappa) and other chemistry studies showed no signs of residual disease. An x-ray bone survey shows no change from 1 year earlier and a PET CT scan shows no hypermetabolic lesions except the right anterior third rib... It does not look to be a myeloma lesion but rather a subacute rib fracture with hypermetabolic signs of healing. Myeloma in remission, however marrow is hypocellular.    2.  HEME: Keep Hgb>8g/dL, plt >10k.   - Persistant profound thrombocytopenia. Nplate started 3/19 with some improvement.  Plt reached a plateau on max dose N plate.  Last dose 7/14.  Plt down to 75 today, received dose of N-plate 9/2. Due for dose this week but will hold off d/t diffuse rash. Re-evaluate on Thursday.   - Previous trial of promacta not helpful. Stopped 12/6/19. Trial of prednisone not helpful, Pred taper complete on 4/16.      3. Derm: Diffuse rash (see pics above). No infectious symptoms. See extensive hx as above. Started medrol dose pack on 9/11- has 3 days left. Appears like a drug rash but no new medications. Possible due to change in carrier or dye of lisinopril as it pt was notified of a  physical change to appearance of lisinopril when he picked it up last week?  - Skin bx today  - Finish medrol dose pack  - Hold lisinopril  - Referral to derm 9/17      4.  ID: afebrile   - Hx FUOs: Extensive ID work up- no etiology identified.   - Given 1 year vaccines 6/4/20, Boosters given 8/2020    Prophy: ACV, Fluconazole   - influenza vaccination given 11/26/19     5. CV: HTN.  On lisinopril 10mg daily. Hold d/t possible culprit of rash (see above).      6.  FEN/Renal: Cr and lytes WNL     7.  Mood: Continue Paxil.     8. FUOs - Some kind of auto immune process DDX: sarcoidosis? Underwent EBUS FNA which was negative for granulomas however unlikely you could see this on just fluid aspirate. FUOs resolved with steroids and has not recurred.       RTC: Thursday for labs, provider visit and dermatology referral. Would hold nplate unless rash dramatically better.     Fei Meng PA-C  [phone]

## 2020-09-14 NOTE — LETTER
9/14/2020         RE: Angel Yanez  [address]  [place] [address]        Dear Colleague,    Thank you for referring your patient, Angel Yanez, to the [facility] BLOOD AND MARROW TRANSPLANT. Please see a copy of my visit note below.    BMT Clinic Note    Patient ID: Angel Yanez is a 60 yo man 1 year and 3 mo s/p 2nd autologous transplant for MM.     Interval history:     Guille is seen for an add on visit for his rash. Rash started 9/9 on back and upper chest and was red and raised. He started TMC and rash then progressed. He was then started on medrol dose pack on 9/11. Since that time rash has continued to spread to the rest of his body and become more confluent erythema. It is not raised any more but is very itchy. No involvement of mucous membranes. No fevers, chills, infectious symptoms. No recent travel, hygiene products, or detergents. Went paddle boarding. but did not go swimming or get in the water. No new medications but notes that he did refill his paxil and lisinopril 8/27 and started taking them last week. When he picked up his lisinopril prescription it had a note saying that the medication may look different in color or shape. He confirms still taking this medication.     ROS: 8 point ROS neg unless specified above.       Physical Exam:   Blood pressure (!) 145/87, pulse 82, temperature 98.3  F (36.8  C), resp. rate 18, weight 80.1 kg (176 lb 8 oz), SpO2 96 %.    General: NAD  HEENT:FARRAH.  Sclera anicteric.  OP clear, buccal mucosa moist  Lungs: breathing comfortably on room air, no stridor.   Skin: Diffuse erythema/macular rash with large areas of confluent erythema from the shoulders down. Spares the palms and does not involve the mouth. Rash appears to be clearing along upper back.  (see pics below)  MSK/Extremities: Warm, well perfused. No edema  Neurologic: alert, and conversant    9/14/20:           Data:     Lab Results   Component Value Date    WBC 5.0 09/14/2020    ANEU  3.7 09/14/2020    HGB 11.7 (L) 09/14/2020    HCT 34.8 (L) 09/14/2020    PLT 56 (L) 09/14/2020     09/02/2020    POTASSIUM 4.1 09/02/2020    CHLORIDE 108 09/02/2020    CO2 26 09/02/2020    GLC 87 09/02/2020    BUN 16 09/02/2020    CR 0.82 09/02/2020    MAG 2.1 09/02/2020    INR 1.03 04/30/2020    BILITOTAL 0.3 09/02/2020    AST 20 09/02/2020    ALT 34 09/02/2020    ALKPHOS 69 09/02/2020    PROTTOTAL 7.2 09/02/2020    ALBUMIN 3.7 09/02/2020       Assessment and Plan:     Angel Yanez is a 60 yo man 1 year and 3 mo s/p 2nd autologous transplant for MM with ongoing cytopenias.     1.  BMT/MM:   Slow engraftment; cell dose was only 0.639x10^6. ([facility] [place] - known poor cell dose as failed chemo-mobilization 1/2019.)   - day +180 bone marrow biopsy 11/6/19 which showed no morphologic or immunophenotypic evidence of plasma cell neoplasm. His light chains were not elevated and he had no monoclonal protein in the serum.  He is in clinical remission.  - PET in 8/2019 clear.   - 1 year bmbx See [facility]'s complete note: no monoclonal protein (his disease was originally IgA kappa) and other chemistry studies showed no signs of residual disease. An x-ray bone survey shows no change from 1 year earlier and a PET CT scan shows no hypermetabolic lesions except the right anterior third rib... It does not look to be a myeloma lesion but rather a subacute rib fracture with hypermetabolic signs of healing. Myeloma in remission, however marrow is hypocellular.    2.  HEME: Keep Hgb>8g/dL, plt >10k.   - Persistant profound thrombocytopenia. Nplate started 3/19 with some improvement.  Plt reached a plateau on max dose N plate.  Last dose 7/14.  Plt down to 75 today, received dose of N-plate 9/2. Due for dose this week but will hold off d/t diffuse rash. Re-evaluate on Thursday.   - Previous trial of promacta not helpful. Stopped 12/6/19. Trial of prednisone not helpful, Pred taper complete on 4/16.      3. Derm: Diffuse rash  (see pics above). No infectious symptoms. See extensive hx as above. Started medrol dose pack on 9/11- has 3 days left. Appears like a drug rash but no new medications. Possible due to change in carrier or dye of lisinopril as it pt was notified of a physical change to appearance of lisinopril when he picked it up last week?  - Skin bx today  - Finish medrol dose pack  - Hold lisinopril  - Referral to derm 9/17      4.  ID: afebrile   - Hx FUOs: Extensive ID work up- no etiology identified.   - Given 1 year vaccines 6/4/20, Boosters given 8/2020    Prophy: ACV, Fluconazole   - influenza vaccination given 11/26/19     5. CV: HTN.  On lisinopril 10mg daily. Hold d/t possible culprit of rash (see above).      6.  FEN/Renal: Cr and lytes WNL     7.  Mood: Continue Paxil.     8. FUOs - Some kind of auto immune process DDX: sarcoidosis? Underwent EBUS FNA which was negative for granulomas however unlikely you could see this on just fluid aspirate. FUOs resolved with steroids and has not recurred.       RTC: Thursday for labs, provider visit and dermatology referral. Would hold nplate unless rash dramatically better.     Fei Meng PA-C  [phone]    Again, thank you for allowing me to participate in the care of your patient.        Sincerely,        BMT Advanced Practice Provider

## 2020-09-14 NOTE — NURSING NOTE
Chief Complaint   Patient presents with     Lab Only     venipuncture, vitals checked     RECHECK     MM~ here for МАРИНА visit     Oncology Rooming Note    Vitals: WDL  Allergies Reviewed: Yes  Medication Reviewed: Yes  Medication Refills Needed: No  Clinical Concerns: None.  Patient has no complaints of pain.  Patient has no complaints of nausea.  Patient complains of a rash that is not improved with prescribed medication.  PA aware.    Marry Astorga RN

## 2020-09-16 LAB — COPATH REPORT: NORMAL

## 2020-09-17 ENCOUNTER — OFFICE VISIT (OUTPATIENT)
Dept: DERMATOLOGY | Facility: CLINIC | Age: 60
End: 2020-09-17
Attending: PHYSICIAN ASSISTANT
Payer: COMMERCIAL

## 2020-09-17 ENCOUNTER — APPOINTMENT (OUTPATIENT)
Dept: LAB | Facility: CLINIC | Age: 60
End: 2020-09-17
Attending: PHYSICIAN ASSISTANT
Payer: COMMERCIAL

## 2020-09-17 ENCOUNTER — ONCOLOGY VISIT (OUTPATIENT)
Dept: TRANSPLANT | Facility: CLINIC | Age: 60
End: 2020-09-17
Attending: PHYSICIAN ASSISTANT
Payer: COMMERCIAL

## 2020-09-17 VITALS
RESPIRATION RATE: 18 BRPM | TEMPERATURE: 97.8 F | DIASTOLIC BLOOD PRESSURE: 93 MMHG | HEART RATE: 62 BPM | OXYGEN SATURATION: 100 % | WEIGHT: 176.6 LBS | BODY MASS INDEX: 25.34 KG/M2 | SYSTOLIC BLOOD PRESSURE: 149 MMHG

## 2020-09-17 DIAGNOSIS — Z94.81 STATUS POST BONE MARROW TRANSPLANT (H): ICD-10-CM

## 2020-09-17 DIAGNOSIS — R21 RASH: Primary | ICD-10-CM

## 2020-09-17 LAB
ALBUMIN SERPL-MCNC: 4 G/DL (ref 3.4–5)
ALP SERPL-CCNC: 72 U/L (ref 40–150)
ALT SERPL W P-5'-P-CCNC: 42 U/L (ref 0–70)
ANION GAP SERPL CALCULATED.3IONS-SCNC: 6 MMOL/L (ref 3–14)
AST SERPL W P-5'-P-CCNC: 17 U/L (ref 0–45)
BASOPHILS # BLD AUTO: 0 10E9/L (ref 0–0.2)
BASOPHILS NFR BLD AUTO: 0.6 %
BILIRUB SERPL-MCNC: 0.5 MG/DL (ref 0.2–1.3)
BUN SERPL-MCNC: 18 MG/DL (ref 7–30)
CALCIUM SERPL-MCNC: 9.1 MG/DL (ref 8.5–10.1)
CHLORIDE SERPL-SCNC: 103 MMOL/L (ref 94–109)
CO2 SERPL-SCNC: 30 MMOL/L (ref 20–32)
CREAT SERPL-MCNC: 0.9 MG/DL (ref 0.66–1.25)
DIFFERENTIAL METHOD BLD: ABNORMAL
EOSINOPHIL # BLD AUTO: 0.6 10E9/L (ref 0–0.7)
EOSINOPHIL NFR BLD AUTO: 16.2 %
ERYTHROCYTE [DISTWIDTH] IN BLOOD BY AUTOMATED COUNT: 13.8 % (ref 10–15)
GFR SERPL CREATININE-BSD FRML MDRD: >90 ML/MIN/{1.73_M2}
GLUCOSE SERPL-MCNC: 92 MG/DL (ref 70–99)
HCT VFR BLD AUTO: 37.4 % (ref 40–53)
HGB BLD-MCNC: 12.4 G/DL (ref 13.3–17.7)
IMM GRANULOCYTES # BLD: 0 10E9/L (ref 0–0.4)
IMM GRANULOCYTES NFR BLD: 0.3 %
LYMPHOCYTES # BLD AUTO: 0.7 10E9/L (ref 0.8–5.3)
LYMPHOCYTES NFR BLD AUTO: 20.5 %
MCH RBC QN AUTO: 34.8 PG (ref 26.5–33)
MCHC RBC AUTO-ENTMCNC: 33.2 G/DL (ref 31.5–36.5)
MCV RBC AUTO: 105 FL (ref 78–100)
MONOCYTES # BLD AUTO: 0.5 10E9/L (ref 0–1.3)
MONOCYTES NFR BLD AUTO: 15.1 %
NEUTROPHILS # BLD AUTO: 1.7 10E9/L (ref 1.6–8.3)
NEUTROPHILS NFR BLD AUTO: 47.3 %
NRBC # BLD AUTO: 0 10*3/UL
NRBC BLD AUTO-RTO: 0 /100
PLATELET # BLD AUTO: 59 10E9/L (ref 150–450)
POTASSIUM SERPL-SCNC: 4.4 MMOL/L (ref 3.4–5.3)
PROT SERPL-MCNC: 8 G/DL (ref 6.8–8.8)
RBC # BLD AUTO: 3.56 10E12/L (ref 4.4–5.9)
SODIUM SERPL-SCNC: 139 MMOL/L (ref 133–144)
WBC # BLD AUTO: 3.5 10E9/L (ref 4–11)

## 2020-09-17 PROCEDURE — 36415 COLL VENOUS BLD VENIPUNCTURE: CPT

## 2020-09-17 PROCEDURE — G0463 HOSPITAL OUTPT CLINIC VISIT: HCPCS | Mod: ZF

## 2020-09-17 PROCEDURE — 85025 COMPLETE CBC W/AUTO DIFF WBC: CPT | Performed by: PHYSICIAN ASSISTANT

## 2020-09-17 PROCEDURE — 80053 COMPREHEN METABOLIC PANEL: CPT | Performed by: PHYSICIAN ASSISTANT

## 2020-09-17 ASSESSMENT — PAIN SCALES - GENERAL
PAINLEVEL: NO PAIN (0)
PAINLEVEL: NO PAIN (0)

## 2020-09-17 NOTE — PROGRESS NOTES
"BMT Clinic Note    Patient ID: Angel Yanez is a 60 yo man 1 year and 4 mo s/p 2nd autologous transplant for MM.     Interval history:     Guille is seen in f/u.  He has been seen most recently in BMT clinic for his rash.  He was just seen in derm clinic too for same rash. Rash started 9/9 on back and upper chest and was red and raised. He started TMC oral antihistamines and rash then progressed. He was then started on medrol dose pack on 9/11. Since that time rash has continued to spread to the rest of his body and become more confluent erythema. It is not raised any more but is very itchy. No involvement of mucous membranes. No fevers, chills, infectious symptoms. No recent travel, new hygiene products or detergents. No new medications but notes that he did refill his paxil and lisinopril 8/27 and started taking them last week. When he picked up his lisinopril prescription it had a note saying that the medication may look different in color or shape.     Today he feels good.  There are no new areas of involvement.  His rash is more hyperpigmented, \"burning out\" in appearing.  He thinks the topical benadryl his helping the most.  Not really applying the topical TCM as he does not like how it feels.  Derm is not sure what the rash is however think it could be drug.  Lisinopril remains on hold.  He is afebrile.  He declines nplate today as he thinks it is not helping.      ROS: 8 point ROS neg unless specified above.       Physical Exam:   Blood pressure (!) 149/93, pulse 62, temperature 97.8  F (36.6  C), temperature source Oral, resp. rate 18, weight 80.1 kg (176 lb 9.6 oz), SpO2 100 %.    General: NAD  HEENT:FARRAH.  Sclera anicteric.  OP clear, buccal mucosa moist  Lungs: breathing comfortably on room air, no stridor.   Skin: Diffuse erythema/macular rash with large areas of confluent erythema from the shoulders down. Spares the palms and does not involve the mouth. Rash appears to be clearing along upper back.  " (see pics below)  MSK/Extremities: Warm, well perfused. No edema  Neurologic: alert, and conversant    9/14/20:           Data:     Lab Results   Component Value Date    WBC 5.0 09/14/2020    ANEU 3.7 09/14/2020    HGB 11.7 (L) 09/14/2020    HCT 34.8 (L) 09/14/2020    PLT 56 (L) 09/14/2020     09/14/2020    POTASSIUM 4.3 09/14/2020    CHLORIDE 104 09/14/2020    CO2 27 09/14/2020     (H) 09/14/2020    BUN 18 09/14/2020    CR 1.07 09/14/2020    MAG 2.1 09/14/2020    INR 1.03 04/30/2020    BILITOTAL 0.4 09/14/2020    AST 12 09/14/2020    ALT 29 09/14/2020    ALKPHOS 70 09/14/2020    PROTTOTAL 7.7 09/14/2020    ALBUMIN 3.9 09/14/2020     Derm Path (9/14):  These features are subtle, but they are compatible with the clinical   impression of a drug eruption.     Assessment and Plan:     Angel Yanez is a 58 yo man 1 year and 4 mo s/p 2nd autologous transplant for MM with ongoing cytopenias.     1.  BMT/MM:   Slow engraftment; cell dose was only 0.639x10^6. ([facility] [place] - known poor cell dose as failed chemo-mobilization 1/2019.)   - day +180 bone marrow biopsy 11/6/19 which showed no morphologic or immunophenotypic evidence of plasma cell neoplasm. His light chains were not elevated and he had no monoclonal protein in the serum.  He is in clinical remission.  - PET in 8/2019 clear.   - 1 year bmbx See [facility]'s complete note: no monoclonal protein (his disease was originally IgA kappa) and other chemistry studies showed no signs of residual disease. An x-ray bone survey shows no change from 1 year earlier and a PET CT scan shows no hypermetabolic lesions except the right anterior third rib... It does not look to be a myeloma lesion but rather a subacute rib fracture with hypermetabolic signs of healing. Myeloma in remission, however marrow is hypocellular.    2.  HEME: Keep Hgb>8g/dL, plt >10k.   - Persistant profound thrombocytopenia. Nplate started 3/19 with some improvement.  Plt reached a plateau  on max dose N plate.  Last dose 7/14.  Received dose of N-plate 9/2. Due for dose this today.  However he prefers not to get this as he notes his plt count has been drifting down on nplate.    - Previous trial of promacta not helpful. Stopped 12/6/19. Trial of prednisone not helpful, Pred taper complete on 4/16.      3. Derm: Diffuse rash (see pics above). No infectious symptoms. See extensive hx as above. Started medrol dose pack on 9/11- has 3 days left. Appears like a drug rash but no new medications. Possible due to change in carrier or dye of lisinopril as it pt was notified of a physical change to appearance of lisinopril when he picked it up last week?  - Skin bx c/w drug rash  - continue topical benadryl for symptom relief  - Hold lisinopril  - seen by derm today, await their final recs      4.  ID: afebrile; derm has ordered HHV6 and EBV (pending).  - Hx FUOs: Extensive ID work up- no etiology identified.   - Given 1 year vaccines 6/4/20, Boosters given 8/2020    Prophy: ACV, Fluconazole   - influenza vaccination given 11/26/19     5. CV: HTN.  On lisinopril 10mg daily. Hold d/t possible culprit of rash (see above).      6.  FEN/Renal: Cr and lytes WNL     7.  Mood: Continue Paxil.     8. FUOs - Some kind of auto immune process DDX: sarcoidosis? Underwent EBUS FNA which was negative for granulomas however unlikely you could see this on just fluid aspirate. FUOs resolved with steroids and has not recurred.       Addendum:  Pt called triage pager on 9/18 as he has now recalled that he took 2 g of amoxicillin about 1 week before rash developed (pre-dental procedure).  He had taken this med about 5 years ago and doesn't recall a rash at that time.  He will hold further doses of that med.    RTC: 2 weeks; sooner if ill, worse rash or w/ fever    Darlin Coulter pa-c  [phone]

## 2020-09-17 NOTE — LETTER
"    9/17/2020         RE: Angel Yanez  [address]  [place] [facility] [address]        Dear Colleague,    Thank you for referring your patient, Angel Yanez, to the [facility] BLOOD AND MARROW TRANSPLANT. Please see a copy of my visit note below.    BMT Clinic Note    Patient ID: Angel Yanez is a 58 yo man 1 year and 4 mo s/p 2nd autologous transplant for MM.     Interval history:     Guille is seen in f/u.  He has been seen most recently in BMT clinic for his rash.  He was just seen in derm clinic too for same rash. Rash started 9/9 on back and upper chest and was red and raised. He started TMC oral antihistamines and rash then progressed. He was then started on medrol dose pack on 9/11. Since that time rash has continued to spread to the rest of his body and become more confluent erythema. It is not raised any more but is very itchy. No involvement of mucous membranes. No fevers, chills, infectious symptoms. No recent travel, new hygiene products or detergents. No new medications but notes that he did refill his paxil and lisinopril 8/27 and started taking them last week. When he picked up his lisinopril prescription it had a note saying that the medication may look different in color or shape.     Today he feels good.  There are no new areas of involvement.  His rash is more hyperpigmented, \"burning out\" in appearing.  He thinks the topical benadryl his helping the most.  Not really applying the topical TCM as he does not like how it feels.  Derm is not sure what the rash is however think it could be drug.  Lisinopril remains on hold.  He is afebrile.  He declines nplate today as he thinks it is not helping.      ROS: 8 point ROS neg unless specified above.       Physical Exam:   Blood pressure (!) 149/93, pulse 62, temperature 97.8  F (36.6  C), temperature source Oral, resp. rate 18, weight 80.1 kg (176 lb 9.6 oz), SpO2 100 %.    General: NAD  HEENT:FARRHA.  Sclera anicteric.  OP clear, buccal mucosa " moist  Lungs: breathing comfortably on room air, no stridor.   Skin: Diffuse erythema/macular rash with large areas of confluent erythema from the shoulders down. Spares the palms and does not involve the mouth. Rash appears to be clearing along upper back.  (see pics below)  MSK/Extremities: Warm, well perfused. No edema  Neurologic: alert, and conversant    9/14/20:           Data:     Lab Results   Component Value Date    WBC 5.0 09/14/2020    ANEU 3.7 09/14/2020    HGB 11.7 (L) 09/14/2020    HCT 34.8 (L) 09/14/2020    PLT 56 (L) 09/14/2020     09/14/2020    POTASSIUM 4.3 09/14/2020    CHLORIDE 104 09/14/2020    CO2 27 09/14/2020     (H) 09/14/2020    BUN 18 09/14/2020    CR 1.07 09/14/2020    MAG 2.1 09/14/2020    INR 1.03 04/30/2020    BILITOTAL 0.4 09/14/2020    AST 12 09/14/2020    ALT 29 09/14/2020    ALKPHOS 70 09/14/2020    PROTTOTAL 7.7 09/14/2020    ALBUMIN 3.9 09/14/2020     Derm Path (9/14):  These features are subtle, but they are compatible with the clinical   impression of a drug eruption.     Assessment and Plan:     Angel Yanez is a 60 yo man 1 year and 4 mo s/p 2nd autologous transplant for MM with ongoing cytopenias.     1.  BMT/MM:   Slow engraftment; cell dose was only 0.639x10^6. ([facility] [place] - known poor cell dose as failed chemo-mobilization 1/2019.)   - day +180 bone marrow biopsy 11/6/19 which showed no morphologic or immunophenotypic evidence of plasma cell neoplasm. His light chains were not elevated and he had no monoclonal protein in the serum.  He is in clinical remission.  - PET in 8/2019 clear.   - 1 year bmbx See [facility]'s complete note: no monoclonal protein (his disease was originally IgA kappa) and other chemistry studies showed no signs of residual disease. An x-ray bone survey shows no change from 1 year earlier and a PET CT scan shows no hypermetabolic lesions except the right anterior third rib... It does not look to be a myeloma lesion but rather a  subacute rib fracture with hypermetabolic signs of healing. Myeloma in remission, however marrow is hypocellular.    2.  HEME: Keep Hgb>8g/dL, plt >10k.   - Persistant profound thrombocytopenia. Nplate started 3/19 with some improvement.  Plt reached a plateau on max dose N plate.  Last dose 7/14.  Received dose of N-plate 9/2. Due for dose this today.  However he prefers not to get this as he notes his plt count has been drifting down on nplate.    - Previous trial of promacta not helpful. Stopped 12/6/19. Trial of prednisone not helpful, Pred taper complete on 4/16.      3. Derm: Diffuse rash (see pics above). No infectious symptoms. See extensive hx as above. Started medrol dose pack on 9/11- has 3 days left. Appears like a drug rash but no new medications. Possible due to change in carrier or dye of lisinopril as it pt was notified of a physical change to appearance of lisinopril when he picked it up last week?  - Skin bx c/w drug rash  - continue topical benadryl for symptom relief  - Hold lisinopril  - seen by derm today, await their final recs      4.  ID: afebrile; derm has ordered HHV6 and EBV (pending).  - Hx FUOs: Extensive ID work up- no etiology identified.   - Given 1 year vaccines 6/4/20, Boosters given 8/2020    Prophy: ACV, Fluconazole   - influenza vaccination given 11/26/19     5. CV: HTN.  On lisinopril 10mg daily. Hold d/t possible culprit of rash (see above).      6.  FEN/Renal: Cr and lytes WNL     7.  Mood: Continue Paxil.     8. FUOs - Some kind of auto immune process DDX: sarcoidosis? Underwent EBUS FNA which was negative for granulomas however unlikely you could see this on just fluid aspirate. FUOs resolved with steroids and has not recurred.       Addendum:  Pt called triage pager on 9/18 as he has now recalled that he took 2 g of amoxicillin about 1 week before rash developed (pre-dental procedure).  He had taken this med about 5 years ago and doesn't recall a rash at that time.  He  will hold further doses of that med.    RTC: 2 weeks; sooner if ill, worse rash or w/ fever    Darlin Coulter pa-c  [phone]        Chief Complaint   Patient presents with     Blood Draw     VPT blood draw and vitals     Venipuncture labs drawn from left arm       Again, thank you for allowing me to participate in the care of your patient.        Sincerely,        BMT Advanced Practice Provider

## 2020-09-17 NOTE — PROGRESS NOTES
"[facility] Dermatology Note      Dermatology Problem List:  1. Morbiliform eruption - drug (lisinopril vs naproxen vs amoxicillin) vs viral  - s/p bx 9/14/20 - \"Subtle interface changes and sparse perivascular eosinophils... compatible with the clinical impression of a drug eruption.\"  - s/p medrol dose pack with improvement  - pending labs: CBC w/ diff, CMP, EBV PCR, HHV6 PCR, COVID-19 PCR  - future consideration: Dematoallergy referral; holding as isolated incident    Encounter Date: Sep 17, 2020    CC:   Chief Complaint   Patient presents with     Skin Check     Guille is here today for a skin check          History of Present Illness:  Mr. Angel Yanez is a 60 year old male who presents for evaluation of morbiliform eruption. Angel Yanez is a 60 yo man 1 year and 3 mo s/p 2nd autologous transplant for MM. Patient noted itching and some mild burning and rash 9/9/20. Spread through back to remainder of trunk and arms. Seen 9/14/20 w/ punch biopsy performed. Photos availabl ein heme/onc notes, for some reason not in media tab. Started medrol dosepack. Responding nicely and rash/symptoms are significantly improved. Denies oral ulcerations or mucosal involvement. No fevers. Otherwise is feeling well. Last lisinopril dispensed with sticker stating pill could be different; he does not think the pill looked significantly changed. He possibly took Naproxen in the week before the onset of the rash. Thinks this was a single dosage. Hasn't taken naproxen for several years. Also called in to triage per BMT notes and noted he took 1 dose of amoxicillin pre-dental procedure 1 week prior to rash.    Timeline:  Onset 9/9/20    ?Naproxen 1 week prior  Amoxicillin 1 week prior    Chronic  Lisinopril - maybe new packaging recently  Tylenol  Vit D  Paxil     He had recent labs on 9/14 which were notable for baseline thrombocytopenia and normal liver enzymes and WBC/eos.  CMV DNA negative " 9/2/2020.    Otherwise feeling well, no additional skin concerns.     Past Medical History:   Patient Active Problem List   Diagnosis     Psoriasis with arthropathy (H)     Hypogammaglobulinemia (H)     Multiple myeloma not having achieved remission (H)     Lymphadenopathy     EBV (Muna-Barr virus) viremia     Fever in adult     Recurrent fever     FARZANA (acute kidney injury) (H)     Thrombocytopenia (H)     Status post bone marrow transplant (H)     Past Medical History:   Diagnosis Date     GERD (gastroesophageal reflux disease)      H/O autologous stem cell transplant (H) 02/2005     Hyperlipidemia      Multiple myeloma (H) 2004     PONV (postoperative nausea and vomiting)      Psoriasis      Psoriatic arthritis (H)      Past Surgical History:   Procedure Laterality Date     ARTHROPLASTY HIP       BIOPSY LYMPH NODE AXILLA N/A 11/15/2019    Procedure: Right axillary lymph node biopsy;  Surgeon: Reese Irving MD;  Location: [facility] OR     COLONOSCOPY       ENDOBRONCHIAL ULTRASOUND FLEXIBLE N/A 12/16/2019    Procedure: flexible bronchoscopy, endobronchial ultrasound;  Surgeon: Kyaw Escudero MD;  Location: [facility] OR     HERNIA REPAIR       IR CVC TUNNEL PLACEMENT > 5 YRS OF AGE  1/22/2019     IR CVC TUNNEL PLACEMENT > 5 YRS OF AGE  5/16/2019     IR CVC TUNNEL REMOVAL LEFT  2/20/2019     IR CVC TUNNEL REMOVAL RIGHT  7/23/2019     IR FOLLOW UP VISIT OUTPATIENT  1/24/2019     IR LYMPH NODE BIOPSY  10/18/2019     ORTHOPEDIC SURGERY       PROCURE BONE MARROW N/A 5/14/2019    Procedure: Bone Marrow [place];  Surgeon: Antwan Erickson MD;  Location: [facility] OR     TRANSPLANT         Social History:  Patient reports that he has quit smoking. He has never used smokeless tobacco. He reports current alcohol use. He reports that he does not use drugs.    Family History:  Family History   Problem Relation Age of Onset     Diabetes Mother      Cerebrovascular Disease Mother      Leukemia Father        Medications:  Current  "Outpatient Medications   Medication Sig Dispense Refill     acetaminophen (TYLENOL) 325 MG tablet Take 1-2 tablets (325-650 mg) by mouth every 4 hours as needed for fever       calcium carbonate (CALCIUM CARBONATE) 600 MG tablet Take 2 tablets by mouth daily        Cholecalciferol (VITAMIN D3) 2000 units CAPS Take 2,000 Units by mouth daily        lisinopril (ZESTRIL) 10 MG tablet Take 1 tablet (10 mg) by mouth daily 30 tablet 3     methylPREDNISolone (MEDROL DOSEPAK) 4 MG tablet therapy pack Follow Package Directions 21 tablet 0     PARoxetine (PAXIL) 20 MG tablet Take 1 tablet (20 mg) by mouth every morning 90 tablet 4        Allergies   Allergen Reactions     Avalide Hives     Chlorhexidine Itching     Chloroxylenol Rash     Technicare solution     Lorazepam Hives     Moxifloxacin Hives         Review of Systems:  -As per HPI  -Constitutional: Otherwise feeling well today, in usual state of health.  -HEENT: Patient denies nonhealing oral sores.  -Skin: As above in HPI. No additional skin concerns.    Physical exam:  GEN: This is a well developed, well-nourished male in no acute distress, in a pleasant mood.    SKIN: Total skin excluding the undergarment areas was performed. The exam included the head/face, neck, both arms, chest, back, abdomen, both legs, digits and/or nails.   -Jacob skin type: II  -Pink-brown coalescing finely raised papules with superficial scale consistent with resolving previously noted morbiliform rash.  - Reviewed photos from BMT note as well.  -No other lesions of concern on areas examined.     Labs:  Reviewed.    Impression/Plan:  1. Morbiliform eruption - ddx drug (?lisinopril additive vs naproxen vs amoxicillin) vs viral. s/p bx 9/14/20 - \"Subtle interface changes and sparse perivascular eosinophils. compatible with the clinical impression of a drug eruption.\"   Will round out viral work-up with EBV, HHV6, and COVID testing. Appears to be resolving s/p medrol dose pack and no " signs of complicated eruption (recent LFTs, WBC wnl). Agree with monitoring CBC w/ diff & CMP. Assuming these are reassuring, discussed options including clinical monitoring vs allergy referral. As simple morbiliform eruption, resolving nature, and without evidence of systemic involvement, will plan to monitor off treatment. Ok with trial of lisinopril resumption as it is unclear if formulation has changed or if this is a true culprit. Should he note recurrence, patient instructed to call / mychart for evaluation. Additionally if he retrials NSAIDs or amoxicillin again, he should also monitor for rash as cannot exclude these as culprit. Could consider dermatoallergy referral at that time.  - ongoing monitoring  - labs today: CBC w/ diff, CMP, EBV PCR, HHV6 PCR, COVID-19 PCR  - from derm standpoint, would be okay to retrial lisinopril, NSAIDs, and/or amoxicillin if needed as no signs of complicated drug eruption but would do in staggered fashion (no more than 1 new drug every 1-2 weeks to allow time for eruption to develop) and closely monitor in order to determine potential culprit  - future consideration: Dematoallergy referral; holding as isolated incident      CC Fei Meng PA-C  [address] [place] [address] on close of this encounter.    Follow-up prn for new or changing lesions. Call or mychart with recurrence in rash or other questions.    Dr. Edgar staffed the patient.    Staff Involved:  Resident(Ezra)/Staff    Staff Physician Comments:   I saw and evaluated the patient with the resident and I agree with the assessment and plan.  I was present for the examination.    Izabela Edgar MD    Department of Dermatology  [facility]: Phone: [phone], Fax: [phone]  9/18/2020

## 2020-09-17 NOTE — LETTER
"9/17/2020       RE: Angel Yanez  [address]  [facility] [address]     Dear Colleague,    Thank you for referring your patient, Angel Yanez, to the [facility] DERMATOLOGY at [facility]. Please see a copy of my visit note below.    [facility] Dermatology Note      Dermatology Problem List:  1. Morbiliform eruption - drug (lisinopril vs naproxen vs amoxicillin) vs viral  - s/p bx 9/14/20 - \"Subtle interface changes and sparse perivascular eosinophils... compatible with the clinical impression of a drug eruption.\"  - s/p medrol dose pack with improvement  - pending labs: CBC w/ diff, CMP, EBV PCR, HHV6 PCR, COVID-19 PCR  - future consideration: Dematoallergy referral; holding as isolated incident    Encounter Date: Sep 17, 2020    CC:   Chief Complaint   Patient presents with     Skin Check     Guille is here today for a skin check          History of Present Illness:  Mr. Angel Yanez is a 60 year old male who presents for evaluation of morbiliform eruption. Angel Yanez is a 60 yo man 1 year and 3 mo s/p 2nd autologous transplant for MM. Patient noted itching and some mild burning and rash 9/9/20. Spread through back to remainder of trunk and arms. Seen 9/14/20 w/ punch biopsy performed. Photos availabl anabel heme/onc notes, for some reason not in media tab. Started medrol dosepack. Responding nicely and rash/symptoms are significantly improved. Denies oral ulcerations or mucosal involvement. No fevers. Otherwise is feeling well. Last lisinopril dispensed with sticker stating pill could be different; he does not think the pill looked significantly changed. He possibly took Naproxen in the week before the onset of the rash. Thinks this was a single dosage. Hasn't taken naproxen for several years. Also called in to triage per BMT notes and noted he took 1 dose of amoxicillin pre-dental procedure 1 week prior to rash.    Timeline:  Onset " 9/9/20    ?Naproxen 1 week prior  Amoxicillin 1 week prior    Chronic  Lisinopril - maybe new packaging recently  Tylenol  Vit D  Paxil     He had recent labs on 9/14 which were notable for baseline thrombocytopenia and normal liver enzymes and WBC/eos.  CMV DNA negative 9/2/2020.    Otherwise feeling well, no additional skin concerns.     Past Medical History:   Patient Active Problem List   Diagnosis     Psoriasis with arthropathy (H)     Hypogammaglobulinemia (H)     Multiple myeloma not having achieved remission (H)     Lymphadenopathy     EBV (Muna-Barr virus) viremia     Fever in adult     Recurrent fever     FARZANA (acute kidney injury) (H)     Thrombocytopenia (H)     Status post bone marrow transplant (H)     Past Medical History:   Diagnosis Date     GERD (gastroesophageal reflux disease)      H/O autologous stem cell transplant (H) 02/2005     Hyperlipidemia      Multiple myeloma (H) 2004     PONV (postoperative nausea and vomiting)      Psoriasis      Psoriatic arthritis (H)      Past Surgical History:   Procedure Laterality Date     ARTHROPLASTY HIP       BIOPSY LYMPH NODE AXILLA N/A 11/15/2019    Procedure: Right axillary lymph node biopsy;  Surgeon: Reese Irving MD;  Location: [facility] OR     COLONOSCOPY       ENDOBRONCHIAL ULTRASOUND FLEXIBLE N/A 12/16/2019    Procedure: flexible bronchoscopy, endobronchial ultrasound;  Surgeon: Kyaw Escudero MD;  Location: [facility] OR     HERNIA REPAIR       IR CVC TUNNEL PLACEMENT > 5 YRS OF AGE  1/22/2019     IR CVC TUNNEL PLACEMENT > 5 YRS OF AGE  5/16/2019     IR CVC TUNNEL REMOVAL LEFT  2/20/2019     IR CVC TUNNEL REMOVAL RIGHT  7/23/2019     IR FOLLOW UP VISIT OUTPATIENT  1/24/2019     IR LYMPH NODE BIOPSY  10/18/2019     ORTHOPEDIC SURGERY       PROCURE BONE MARROW N/A 5/14/2019    Procedure: Bone Marrow [place];  Surgeon: Antwan Erickson MD;  Location: [facility] OR     TRANSPLANT         Social History:  Patient reports that he has quit smoking. He has  never used smokeless tobacco. He reports current alcohol use. He reports that he does not use drugs.    Family History:  Family History   Problem Relation Age of Onset     Diabetes Mother      Cerebrovascular Disease Mother      Leukemia Father        Medications:  Current Outpatient Medications   Medication Sig Dispense Refill     acetaminophen (TYLENOL) 325 MG tablet Take 1-2 tablets (325-650 mg) by mouth every 4 hours as needed for fever       calcium carbonate (CALCIUM CARBONATE) 600 MG tablet Take 2 tablets by mouth daily        Cholecalciferol (VITAMIN D3) 2000 units CAPS Take 2,000 Units by mouth daily        lisinopril (ZESTRIL) 10 MG tablet Take 1 tablet (10 mg) by mouth daily 30 tablet 3     methylPREDNISolone (MEDROL DOSEPAK) 4 MG tablet therapy pack Follow Package Directions 21 tablet 0     PARoxetine (PAXIL) 20 MG tablet Take 1 tablet (20 mg) by mouth every morning 90 tablet 4        Allergies   Allergen Reactions     Avalide Hives     Chlorhexidine Itching     Chloroxylenol Rash     Technicare solution     Lorazepam Hives     Moxifloxacin Hives         Review of Systems:  -As per HPI  -Constitutional: Otherwise feeling well today, in usual state of health.  -HEENT: Patient denies nonhealing oral sores.  -Skin: As above in HPI. No additional skin concerns.    Physical exam:  GEN: This is a well developed, well-nourished male in no acute distress, in a pleasant mood.    SKIN: Total skin excluding the undergarment areas was performed. The exam included the head/face, neck, both arms, chest, back, abdomen, both legs, digits and/or nails.   -Jacob skin type: II  -Pink-brown coalescing finely raised papules with superficial scale consistent with resolving previously noted morbiliform rash.  - Reviewed photos from BMT note as well.  -No other lesions of concern on areas examined.     Labs:  Reviewed.    Impression/Plan:  1. Morbiliform eruption - ddx drug (?lisinopril additive vs naproxen vs  "amoxicillin) vs viral. s/p bx 9/14/20 - \"Subtle interface changes and sparse perivascular eosinophils. compatible with the clinical impression of a drug eruption.\"   Will round out viral work-up with EBV, HHV6, and COVID testing. Appears to be resolving s/p medrol dose pack and no signs of complicated eruption (recent LFTs, WBC wnl). Agree with monitoring CBC w/ diff & CMP. Assuming these are reassuring, discussed options including clinical monitoring vs allergy referral. As simple morbiliform eruption, resolving nature, and without evidence of systemic involvement, will plan to monitor off treatment. Ok with trial of lisinopril resumption as it is unclear if formulation has changed or if this is a true culprit. Should he note recurrence, patient instructed to call / mychart for evaluation. Additionally if he retrials NSAIDs or amoxicillin again, he should also monitor for rash as cannot exclude these as culprit. Could consider dermatoallergy referral at that time.  - ongoing monitoring  - labs today: CBC w/ diff, CMP, EBV PCR, HHV6 PCR, COVID-19 PCR  - from derm standpoint, would be okay to retrial lisinopril, NSAIDs, and/or amoxicillin if needed as no signs of complicated drug eruption but would do in staggered fashion (no more than 1 new drug every 1-2 weeks to allow time for eruption to develop) and closely monitor in order to determine potential culprit  - future consideration: Dematoallergy referral; holding as isolated incident      CC Fei Meng PA-C  [address] [place] [address] on close of this encounter.    Follow-up prn for new or changing lesions. Call or mychart with recurrence in rash or other questions.    Dr. Edgar staffed the patient.    Staff Involved:  Resident(Ezra)/Staff    Staff Physician Comments:   I saw and evaluated the patient with the resident and I agree with the assessment and plan.  I was present for the examination.    Izabela Edgar MD  Assistant " Professor  Department of Dermatology  [facility]: Phone: [phone], Fax: [phone]  9/18/2020

## 2020-09-17 NOTE — NURSING NOTE
"Oncology Rooming Note    September 17, 2020 3:15 PM   Angel Yanez is a 60 year old male who presents for:    Chief Complaint   Patient presents with     Blood Draw     VPT blood draw and vitals     RECHECK     here for provider visit HX: s/p BMT     Initial Vitals: BP (!) 149/93   Pulse 62   Temp 97.8  F (36.6  C) (Oral)   Resp 18   Wt 80.1 kg (176 lb 9.6 oz)   SpO2 100%   BMI 25.34 kg/m   Estimated body mass index is 25.34 kg/m  as calculated from the following:    Height as of 7/29/20: 1.778 m (5' 10\").    Weight as of this encounter: 80.1 kg (176 lb 9.6 oz). Body surface area is 1.99 meters squared.  No Pain (0) Comment: Data Unavailable   No LMP for male patient.  Allergies reviewed: Yes  Medications reviewed: Yes    Medications: Medication refills not needed today.  Pharmacy name entered into EPIC:    [facility] - [place], [place] - [address] PHARMACY [facility] - [place], [place] - [address] [facility]    Clinical concerns: PT s/p derm appt for new rash.  PT denies fevers or pain at this time.       Emelina Bose RN              "

## 2020-09-17 NOTE — PATIENT INSTRUCTIONS
We'll check some labs and add on some viral studies including COVID screening.  Once this has resolved, it's probably not a bad idea to trial resuming the lisinopril. If the rash were to re-occur with resumption, that could represent a culprit. Aleve could represent another trigger.   If it does recur, send a message or call for an appointment. We can consider referral for allergy testing at that time.

## 2020-09-17 NOTE — NURSING NOTE
Dermatology Rooming Note    Angel Yanez's goals for this visit include:   Chief Complaint   Patient presents with     Skin Check     Guille is here today for a skin check      GENE Jansen

## 2020-09-18 ENCOUNTER — TELEPHONE (OUTPATIENT)
Dept: TRANSPLANT | Facility: CLINIC | Age: 60
End: 2020-09-18

## 2020-09-18 LAB
EBV DNA # SPEC NAA+PROBE: 6115 {COPIES}/ML
EBV DNA SPEC NAA+PROBE-LOG#: 3.8 {LOG_COPIES}/ML
HHV6 DNA # SPEC NAA+PROBE: NORMAL COPIES/ML
HHV6 DNA SPEC NAA+PROBE-LOG#: NORMAL LOG COPIES/ML
SPECIMEN SOURCE: NORMAL

## 2020-09-18 NOTE — TELEPHONE ENCOUNTER
Pt called experiencing symptoms, following up about a know rash he's had and wants to know what to do for medications. I informed pt that I will page an МАРИНА to give Guille a call @ [phone] and if pt does not get a call back within half an hour to call [phone] again. Pt agreed. МАРИНА paged.

## 2020-09-30 NOTE — PROGRESS NOTES
BMT Clinic Note    Patient ID: Angel Yanez is a 58 yo man 1 year and 4 mo s/p 2nd autologous transplant for MM.     Interval history:     Guille returns for follow up. Rash has completely resolved but remains hyerpigmented.BP is elevated today, denies any vision changes or headaches. He has not resumed his lisnopril (thought to be the culprit d/t change in color of the tablet when he picked it up) but also remembered that 1 week prior to his rash he took Amoxicillin prior to going to the dentist. No other new medical complaints. He does not wish to get N-plate as plts are stable.     ROS: 8 point ROS neg unless specified above.       Physical Exam:   Blood pressure (!) 171/90, pulse 65, temperature 97.6  F (36.4  C), resp. rate 18, weight 80.7 kg (178 lb), SpO2 99 %.    General: NAD  HEENT:FARRAH.  Sclera anicteric.  OP clear, buccal mucosa moist  Lungs: breathing comfortably on room air, no stridor.   Skin: Diffuse hyperpigmentation in the distribution of prior rash (see pics below). No areas of blisters or erythema.   MSK/Extremities: Warm, well perfused. No edema  Neurologic: alert, and conversant    9/14/20:           Data:     Lab Results   Component Value Date    WBC 3.6 (L) 10/01/2020    ANEU 2.0 10/01/2020    HGB 12.9 (L) 10/01/2020    HCT 39.3 (L) 10/01/2020    PLT 59 (L) 10/01/2020     10/01/2020    POTASSIUM 4.3 10/01/2020    CHLORIDE 104 10/01/2020    CO2 28 10/01/2020    GLC 97 10/01/2020    BUN 18 10/01/2020    CR 1.00 10/01/2020    MAG 2.1 09/14/2020    INR 1.03 04/30/2020    BILITOTAL 0.5 09/17/2020    AST 17 09/17/2020    ALT 42 09/17/2020    ALKPHOS 72 09/17/2020    PROTTOTAL 8.0 09/17/2020    ALBUMIN 4.0 09/17/2020     Derm Path (9/14):  These features are subtle, but they are compatible with the clinical   impression of a drug eruption.     Assessment and Plan:     Angel Yanez is a 58 yo man 1 year and 4 mo s/p 2nd autologous transplant for MM with ongoing cytopenias.     1.   BMT/MM:   Slow engraftment; cell dose was only 0.639x10^6. (Marrow [place] - known poor cell dose as failed chemo-mobilization 1/2019.)   - day +180: CR  - PET in 8/2019 clear.   - 1 year bmbx/PET scan.  See [facility]'s complete note: Myeloma in remission, however marrow is hypocellular.    2.  HEME: Keep Hgb>8g/dL, plt >10k.   - Persistant profound thrombocytopenia. Previous trials of promacta and prednisone not helpful.  Nplate started 3/19 with some improvement.  Plt reached a plateau on max dose N plate.  Last dose 7/14.  Given again 9/2 as plts lower. Pt does not wish to get this as plts overall stable at 59k.       3. Derm:   Diffuse rash 9/9/20 (see pics above): bx consistent with drug rash. Only new medication was amoxicillin that he took before the dentist 1 week prior. Lisinopril also changed colors/formualtions when he picked it up ~1 week prior to the rash as well. Seen by Derm 9/17. Rash resolved with medrol dose pack, steroid creams, and benadryl cream.   - Pt will trial resuming lisinopril but knows to stop this if rash recurs.       4.  ID: afebrile;   - EBV 6k and HHV6 neg 9/17/20  - Hx FUOs: Extensive ID work up- no etiology identified.   - Given 1 year vaccines 6/4/20, Boosters given 8/2020    Prophy: ACV, Fluconazole   - influenza vaccination given 11/26/19. Pt will get this done either here or locally- he will let us know.      5. CV: HTN.  On lisinopril 10mg daily. Held with possible contribution to rash (See above). /90 today. Discussed trial of different BP med but pt feels strongly that the rash was from amoxicillin. He will re-trial the lisinopril. If develops rash will call BMT clinic about different BP med.      6.  FEN/Renal: Cr and lytes WNL     7.  Mood: Continue Paxil.     8. FUOs - Some kind of auto immune process DDX: sarcoidosis? Underwent EBUS FNA which was negative for granulomas however unlikely you could see this on just fluid aspirate. FUOs resolved with steroids and has  not recurred.       RTC: 3 weeks, sooner prn.     Fei Meng PA-C  [phone]

## 2020-10-01 ENCOUNTER — ONCOLOGY VISIT (OUTPATIENT)
Dept: TRANSPLANT | Facility: CLINIC | Age: 60
End: 2020-10-01
Attending: INTERNAL MEDICINE
Payer: COMMERCIAL

## 2020-10-01 ENCOUNTER — APPOINTMENT (OUTPATIENT)
Dept: LAB | Facility: CLINIC | Age: 60
End: 2020-10-01
Attending: INTERNAL MEDICINE
Payer: COMMERCIAL

## 2020-10-01 VITALS
BODY MASS INDEX: 25.54 KG/M2 | RESPIRATION RATE: 18 BRPM | TEMPERATURE: 97.6 F | SYSTOLIC BLOOD PRESSURE: 171 MMHG | OXYGEN SATURATION: 99 % | DIASTOLIC BLOOD PRESSURE: 90 MMHG | HEART RATE: 65 BPM | WEIGHT: 178 LBS

## 2020-10-01 DIAGNOSIS — Z94.81 STATUS POST BONE MARROW TRANSPLANT (H): ICD-10-CM

## 2020-10-01 LAB
ANION GAP SERPL CALCULATED.3IONS-SCNC: 5 MMOL/L (ref 3–14)
BASOPHILS # BLD AUTO: 0 10E9/L (ref 0–0.2)
BASOPHILS NFR BLD AUTO: 1.1 %
BUN SERPL-MCNC: 18 MG/DL (ref 7–30)
CALCIUM SERPL-MCNC: 9.2 MG/DL (ref 8.5–10.1)
CHLORIDE SERPL-SCNC: 104 MMOL/L (ref 94–109)
CO2 SERPL-SCNC: 28 MMOL/L (ref 20–32)
CREAT SERPL-MCNC: 1 MG/DL (ref 0.66–1.25)
DIFFERENTIAL METHOD BLD: ABNORMAL
EOSINOPHIL # BLD AUTO: 0.3 10E9/L (ref 0–0.7)
EOSINOPHIL NFR BLD AUTO: 7 %
ERYTHROCYTE [DISTWIDTH] IN BLOOD BY AUTOMATED COUNT: 14.2 % (ref 10–15)
GFR SERPL CREATININE-BSD FRML MDRD: 80 ML/MIN/{1.73_M2}
GLUCOSE SERPL-MCNC: 97 MG/DL (ref 70–99)
HCT VFR BLD AUTO: 39.3 % (ref 40–53)
HGB BLD-MCNC: 12.9 G/DL (ref 13.3–17.7)
IMM GRANULOCYTES # BLD: 0 10E9/L (ref 0–0.4)
IMM GRANULOCYTES NFR BLD: 0.3 %
LYMPHOCYTES # BLD AUTO: 0.8 10E9/L (ref 0.8–5.3)
LYMPHOCYTES NFR BLD AUTO: 22 %
MCH RBC QN AUTO: 34.8 PG (ref 26.5–33)
MCHC RBC AUTO-ENTMCNC: 32.8 G/DL (ref 31.5–36.5)
MCV RBC AUTO: 106 FL (ref 78–100)
MONOCYTES # BLD AUTO: 0.5 10E9/L (ref 0–1.3)
MONOCYTES NFR BLD AUTO: 14.2 %
NEUTROPHILS # BLD AUTO: 2 10E9/L (ref 1.6–8.3)
NEUTROPHILS NFR BLD AUTO: 55.4 %
NRBC # BLD AUTO: 0 10*3/UL
NRBC BLD AUTO-RTO: 0 /100
PLATELET # BLD AUTO: 59 10E9/L (ref 150–450)
POTASSIUM SERPL-SCNC: 4.3 MMOL/L (ref 3.4–5.3)
RBC # BLD AUTO: 3.71 10E12/L (ref 4.4–5.9)
SODIUM SERPL-SCNC: 137 MMOL/L (ref 133–144)
WBC # BLD AUTO: 3.6 10E9/L (ref 4–11)

## 2020-10-01 PROCEDURE — 80048 BASIC METABOLIC PNL TOTAL CA: CPT | Performed by: PATHOLOGY

## 2020-10-01 PROCEDURE — 36415 COLL VENOUS BLD VENIPUNCTURE: CPT

## 2020-10-01 PROCEDURE — 99213 OFFICE O/P EST LOW 20 MIN: CPT

## 2020-10-01 PROCEDURE — G0463 HOSPITAL OUTPT CLINIC VISIT: HCPCS

## 2020-10-01 PROCEDURE — 85025 COMPLETE CBC W/AUTO DIFF WBC: CPT | Performed by: PATHOLOGY

## 2020-10-01 ASSESSMENT — PAIN SCALES - GENERAL: PAINLEVEL: NO PAIN (0)

## 2020-10-01 NOTE — NURSING NOTE
"Oncology Rooming Note    October 1, 2020 12:51 PM   Angel Yanez is a 60 year old male who presents for:    Chief Complaint   Patient presents with     Lab Only     venipuncture, vitals checked     Oncology Clinic Visit     MULTIPLE MYELOMA      Initial Vitals: BP (!) 171/90   Pulse 65   Temp 97.6  F (36.4  C)   Resp 18   Wt 80.7 kg (178 lb)   SpO2 99%   BMI 25.54 kg/m   Estimated body mass index is 25.54 kg/m  as calculated from the following:    Height as of 7/29/20: 1.778 m (5' 10\").    Weight as of this encounter: 80.7 kg (178 lb). Body surface area is 2 meters squared.  No Pain (0) Comment: Data Unavailable   No LMP for male patient.  Allergies reviewed: Yes  Medications reviewed: Yes    Medications: Medication refills not needed today.  Pharmacy name entered into EPIC:    [facility] - [place], [place] - [address] PHARMACY [facility] [place], [place] - [address]    Clinical concerns: No new concerns today.       Adriana Shelton MA            "

## 2020-10-01 NOTE — LETTER
10/1/2020         RE: Angel Yanez  [address]  [facility] [address]        Dear Colleague,    Thank you for referring your patient, Angel Yanez, to the [facility] BLOOD AND MARROW TRANSPLANT PROGRAM [place]. Please see a copy of my visit note below.    BMT Clinic Note    Patient ID: Angel Yanez is a 60 yo man 1 year and 4 mo s/p 2nd autologous transplant for MM.     Interval history:     Guille returns for follow up. Rash has completely resolved but remains hyerpigmented.BP is elevated today, denies any vision changes or headaches. He has not resumed his lisnopril (thought to be the culprit d/t change in color of the tablet when he picked it up) but also remembered that 1 week prior to his rash he took Amoxicillin prior to going to the dentist. No other new medical complaints. He does not wish to get N-plate as plts are stable.     ROS: 8 point ROS neg unless specified above.       Physical Exam:   Blood pressure (!) 171/90, pulse 65, temperature 97.6  F (36.4  C), resp. rate 18, weight 80.7 kg (178 lb), SpO2 99 %.    General: NAD  HEENT:FARRAH.  Sclera anicteric.  OP clear, buccal mucosa moist  Lungs: breathing comfortably on room air, no stridor.   Skin: Diffuse hyperpigmentation in the distribution of prior rash (see pics below). No areas of blisters or erythema.   MSK/Extremities: Warm, well perfused. No edema  Neurologic: alert, and conversant    9/14/20:           Data:     Lab Results   Component Value Date    WBC 3.6 (L) 10/01/2020    ANEU 2.0 10/01/2020    HGB 12.9 (L) 10/01/2020    HCT 39.3 (L) 10/01/2020    PLT 59 (L) 10/01/2020     10/01/2020    POTASSIUM 4.3 10/01/2020    CHLORIDE 104 10/01/2020    CO2 28 10/01/2020    GLC 97 10/01/2020    BUN 18 10/01/2020    CR 1.00 10/01/2020    MAG 2.1 09/14/2020    INR 1.03 04/30/2020    BILITOTAL 0.5 09/17/2020    AST 17 09/17/2020    ALT 42 09/17/2020    ALKPHOS 72 09/17/2020    PROTTOTAL 8.0 09/17/2020    ALBUMIN 4.0  09/17/2020     Derm Path (9/14):  These features are subtle, but they are compatible with the clinical   impression of a drug eruption.     Assessment and Plan:     Angel Yanez is a 60 yo man 1 year and 4 mo s/p 2nd autologous transplant for MM with ongoing cytopenias.     1.  BMT/MM:   Slow engraftment; cell dose was only 0.639x10^6. (Marrow [place] - known poor cell dose as failed chemo-mobilization 1/2019.)   - day +180: CR  - PET in 8/2019 clear.   - 1 year bmbx/PET scan.  See [facility]'s complete note: Myeloma in remission, however marrow is hypocellular.    2.  HEME: Keep Hgb>8g/dL, plt >10k.   - Persistant profound thrombocytopenia. Previous trials of promacta and prednisone not helpful.  Nplate started 3/19 with some improvement.  Plt reached a plateau on max dose N plate.  Last dose 7/14.  Given again 9/2 as plts lower. Pt does not wish to get this as plts overall stable at 59k.       3. Derm:   Diffuse rash 9/9/20 (see pics above): bx consistent with drug rash. Only new medication was amoxicillin that he took before the dentist 1 week prior. Lisinopril also changed colors/formualtions when he picked it up ~1 week prior to the rash as well. Seen by Derm 9/17. Rash resolved with medrol dose pack, steroid creams, and benadryl cream.   - Pt will trial resuming lisinopril but knows to stop this if rash recurs.       4.  ID: afebrile;   - EBV 6k and HHV6 neg 9/17/20  - Hx FUOs: Extensive ID work up- no etiology identified.   - Given 1 year vaccines 6/4/20, Boosters given 8/2020    Prophy: ACV, Fluconazole   - influenza vaccination given 11/26/19. Pt will get this done either here or locally- he will let us know.      5. CV: HTN.  On lisinopril 10mg daily. Held with possible contribution to rash (See above). /90 today. Discussed trial of different BP med but pt feels strongly that the rash was from amoxicillin. He will re-trial the lisinopril. If develops rash will call BMT clinic about different BP med.       6.  FEN/Renal: Cr and lytes WNL     7.  Mood: Continue Paxil.     8. FUOs - Some kind of auto immune process DDX: sarcoidosis? Underwent EBUS FNA which was negative for granulomas however unlikely you could see this on just fluid aspirate. FUOs resolved with steroids and has not recurred.       RTC: 3 weeks, sooner prn.     Fei Meng PA-C  [phone]

## 2020-10-01 NOTE — NURSING NOTE
Chief Complaint   Patient presents with     Lab Only     venipuncture, vitals checked     Eloisa King RN on 10/1/2020 at 12:15 PM

## 2020-10-14 DIAGNOSIS — D69.6 THROMBOCYTOPENIA (H): ICD-10-CM

## 2020-10-14 RX ORDER — LISINOPRIL 10 MG/1
10 TABLET ORAL DAILY
Qty: 90 TABLET | Refills: 4 | Status: SHIPPED | OUTPATIENT
Start: 2020-10-14 | End: 2020-11-20

## 2020-10-21 ENCOUNTER — TELEPHONE (OUTPATIENT)
Dept: TRANSPLANT | Facility: CLINIC | Age: 60
End: 2020-10-21

## 2020-10-21 NOTE — TELEPHONE ENCOUNTER
Patient called earlier in the week with URI symptoms, no fever.  He had a COVID test at an outside facility but does not have the results yet.  He feels about the same & is scheduled for routine follow up tomorrow.  We will reschedule for next week in hopes he will have his COVID results by then.  Karla Manzo

## 2020-10-27 ENCOUNTER — APPOINTMENT (OUTPATIENT)
Dept: LAB | Facility: CLINIC | Age: 60
End: 2020-10-27
Attending: INTERNAL MEDICINE
Payer: COMMERCIAL

## 2020-10-27 ENCOUNTER — ONCOLOGY VISIT (OUTPATIENT)
Dept: TRANSPLANT | Facility: CLINIC | Age: 60
End: 2020-10-27
Attending: PHYSICIAN ASSISTANT
Payer: COMMERCIAL

## 2020-10-27 VITALS
SYSTOLIC BLOOD PRESSURE: 132 MMHG | WEIGHT: 179.8 LBS | OXYGEN SATURATION: 99 % | BODY MASS INDEX: 25.8 KG/M2 | DIASTOLIC BLOOD PRESSURE: 85 MMHG | TEMPERATURE: 98 F | HEART RATE: 81 BPM | RESPIRATION RATE: 18 BRPM

## 2020-10-27 DIAGNOSIS — Z94.81 STATUS POST BONE MARROW TRANSPLANT (H): Primary | ICD-10-CM

## 2020-10-27 LAB
ALBUMIN SERPL-MCNC: 4.1 G/DL (ref 3.4–5)
ALP SERPL-CCNC: 78 U/L (ref 40–150)
ALT SERPL W P-5'-P-CCNC: 27 U/L (ref 0–70)
ANION GAP SERPL CALCULATED.3IONS-SCNC: 3 MMOL/L (ref 3–14)
AST SERPL W P-5'-P-CCNC: 20 U/L (ref 0–45)
BASOPHILS # BLD AUTO: 0 10E9/L (ref 0–0.2)
BASOPHILS NFR BLD AUTO: 1 %
BILIRUB SERPL-MCNC: 0.3 MG/DL (ref 0.2–1.3)
BUN SERPL-MCNC: 15 MG/DL (ref 7–30)
CALCIUM SERPL-MCNC: 9 MG/DL (ref 8.5–10.1)
CHLORIDE SERPL-SCNC: 106 MMOL/L (ref 94–109)
CO2 SERPL-SCNC: 28 MMOL/L (ref 20–32)
CREAT SERPL-MCNC: 0.91 MG/DL (ref 0.66–1.25)
DIFFERENTIAL METHOD BLD: ABNORMAL
EOSINOPHIL # BLD AUTO: 0.5 10E9/L (ref 0–0.7)
EOSINOPHIL NFR BLD AUTO: 12 %
ERYTHROCYTE [DISTWIDTH] IN BLOOD BY AUTOMATED COUNT: 13.6 % (ref 10–15)
GFR SERPL CREATININE-BSD FRML MDRD: >90 ML/MIN/{1.73_M2}
GLUCOSE SERPL-MCNC: 97 MG/DL (ref 70–99)
HCT VFR BLD AUTO: 36.3 % (ref 40–53)
HGB BLD-MCNC: 12.4 G/DL (ref 13.3–17.7)
IMM GRANULOCYTES # BLD: 0 10E9/L (ref 0–0.4)
IMM GRANULOCYTES NFR BLD: 0.2 %
LYMPHOCYTES # BLD AUTO: 0.9 10E9/L (ref 0.8–5.3)
LYMPHOCYTES NFR BLD AUTO: 21.6 %
MCH RBC QN AUTO: 36 PG (ref 26.5–33)
MCHC RBC AUTO-ENTMCNC: 34.2 G/DL (ref 31.5–36.5)
MCV RBC AUTO: 106 FL (ref 78–100)
MONOCYTES # BLD AUTO: 0.7 10E9/L (ref 0–1.3)
MONOCYTES NFR BLD AUTO: 15.6 %
NEUTROPHILS # BLD AUTO: 2.1 10E9/L (ref 1.6–8.3)
NEUTROPHILS NFR BLD AUTO: 49.6 %
NRBC # BLD AUTO: 0 10*3/UL
NRBC BLD AUTO-RTO: 0 /100
PLATELET # BLD AUTO: 75 10E9/L (ref 150–450)
POTASSIUM SERPL-SCNC: 4.4 MMOL/L (ref 3.4–5.3)
PROT SERPL-MCNC: 7.7 G/DL (ref 6.8–8.8)
RBC # BLD AUTO: 3.44 10E12/L (ref 4.4–5.9)
SODIUM SERPL-SCNC: 138 MMOL/L (ref 133–144)
WBC # BLD AUTO: 4.2 10E9/L (ref 4–11)

## 2020-10-27 PROCEDURE — 80053 COMPREHEN METABOLIC PANEL: CPT | Performed by: PHYSICIAN ASSISTANT

## 2020-10-27 PROCEDURE — 36415 COLL VENOUS BLD VENIPUNCTURE: CPT

## 2020-10-27 PROCEDURE — 99214 OFFICE O/P EST MOD 30 MIN: CPT

## 2020-10-27 PROCEDURE — 87799 DETECT AGENT NOS DNA QUANT: CPT | Performed by: PHYSICIAN ASSISTANT

## 2020-10-27 PROCEDURE — 85025 COMPLETE CBC W/AUTO DIFF WBC: CPT | Performed by: PHYSICIAN ASSISTANT

## 2020-10-27 PROCEDURE — G0463 HOSPITAL OUTPT CLINIC VISIT: HCPCS

## 2020-10-27 ASSESSMENT — PAIN SCALES - GENERAL: PAINLEVEL: NO PAIN (0)

## 2020-10-27 NOTE — LETTER
10/27/2020         RE: Angel Yanez  [address]  [facility] [address]        Dear Colleague,    Thank you for referring your patient, Angel Yanez, to the [facility] BLOOD AND MARROW TRANSPLANT PROGRAM [place]. Please see a copy of my visit note below.    BMT Clinic Note    Patient ID: Angel Yanez is a 60 yo man 1 year and 5 mo s/p 2nd autologous transplant for MM.     Interval history:     Guille returns for follow up. Rash has completely resolved but remains hyerpigmented.BP is better today. He resumed his lisinopril.He had a URI with a dry cough last week without fever.  Cough is better today.   COVID was negative.  No n,v,d.  No bleeding. He does not wish to get N-plate as plts are stable. He is back at work.    ROS: 8 point ROS neg unless specified above.       Physical Exam:   Blood pressure 132/85, pulse 81, temperature 98  F (36.7  C), temperature source Oral, resp. rate 18, weight 81.6 kg (179 lb 12.8 oz), SpO2 99 %.    General: NAD  HEENT:FARRAH.  Sclera anicteric.  OP clear, buccal mucosa moist  Lungs: breathing comfortably on room air, no stridor.   Skin: Faded  Brown pigmentation in the distribution of prior rash. No areas of blisters or erythema.   MSK/Extremities: Warm, well perfused. No edema  Neurologic: alert, and conversant  No line    Data:     Lab Results   Component Value Date    WBC 4.2 10/27/2020    ANEU 2.1 10/27/2020    HGB 12.4 (L) 10/27/2020    HCT 36.3 (L) 10/27/2020    PLT 75 (L) 10/27/2020     10/27/2020    POTASSIUM 4.4 10/27/2020    CHLORIDE 106 10/27/2020    CO2 28 10/27/2020    GLC 97 10/27/2020    BUN 15 10/27/2020    CR 0.91 10/27/2020    MAG 2.1 09/14/2020    INR 1.03 04/30/2020    BILITOTAL 0.3 10/27/2020    AST 20 10/27/2020    ALT 27 10/27/2020    ALKPHOS 78 10/27/2020    PROTTOTAL 7.7 10/27/2020    ALBUMIN 4.1 10/27/2020     Derm Path (9/14):  These features are subtle, but they are compatible with the clinical   impression of a drug  eruption.     Assessment and Plan:     Angel Yanez is a 60 yo man 1 year and 5 mo s/p 2nd autologous transplant for MM with ongoing cytopenias.     1.  BMT/MM:   Slow engraftment; cell dose was only 0.639x10^6. (Marrow [place] - known poor cell dose as failed chemo-mobilization 1/2019.)   - day +180: CR  - PET in 8/2019 clear.   - 1 year bmbx/PET scan.  See [facility]'s complete note: Myeloma in remission, however marrow was hypocellular.    2.  HEME: Keep Hgb>8g/dL, plt >10k.   - History of persistant profound thrombocytopenia. Previous trials of promacta and prednisone not helpful.  Nplate started 3/19 with some improvement.  Plt reached a plateau on max dose N plate.  Last dose 7/14.  Given again 9/2 as plts lower. Pt does not wish to get this as plts overall improved at 75k.       3. Derm:   Diffuse rash 9/9/20 : bx consistent with drug rash. Only new medication was amoxicillin that he took before the dentist 1 week prior. Lisinopril also changed colors/formualtions when he picked it up ~1 week prior to the rash as well. Seen by Derm 9/17. Rash resolved with medrol dose pack, steroid creams, and benadryl cream.  Resumed  Lisinopril without rash reoccurance.       4.  ID: afebrile;   - EBV 6k and HHV6 neg 9/17/20  - Hx FUOs: Extensive ID work up- no etiology identified.   - Given 1 year vaccines 6/4/20, Boosters given 8/2020  -Had flu shot 10/2020 at another place    Prophy: ACV, Fluconazole        5. CV: HTN.  On lisinopril 10mg daily. Held with possible contribution to rash (See above). /90 today. He will re-trialed the lisinopril without return of rash     6.  FEN/Renal: Cr and lytes WNL     7.  Mood: Continue Paxil.     8. FUOs - Some kind of auto immune process DDX: sarcoidosis? Underwent EBUS FNA which was negative for granulomas however unlikely you could see this on just fluid aspirate. FUOs resolved with steroids and has not recurred.       RTC: 4 weeks, sooner prn. Next restaging in  5/2021    Funmi Ospina PA-C  [phone]            Again, thank you for allowing me to participate in the care of your patient.        Sincerely,        BMT Advanced Practice Provider

## 2020-10-27 NOTE — NURSING NOTE
"Oncology Rooming Note    October 27, 2020 12:42 PM   Angel Yanez is a 60 year old male who presents for:    Chief Complaint   Patient presents with     Blood Draw     JI labs drawn from  by RN in lab. Vitals taken.     RECHECK     Multiple Myeloma      Initial Vitals: /85 (BP Location: Left arm, Patient Position: Sitting, Cuff Size: Adult Regular)   Pulse 81   Temp 98  F (36.7  C) (Oral)   Resp 18   Wt 81.6 kg (179 lb 12.8 oz)   SpO2 99%   BMI 25.80 kg/m   Estimated body mass index is 25.8 kg/m  as calculated from the following:    Height as of 7/29/20: 1.778 m (5' 10\").    Weight as of this encounter: 81.6 kg (179 lb 12.8 oz). Body surface area is 2.01 meters squared.  No Pain (0) Comment: Data Unavailable   No LMP for male patient.  Allergies reviewed: Yes  Medications reviewed: Yes    Medications: Medication refills not needed today.  Pharmacy name entered into EPIC:    [facility] [address]  [facility] - [place], [place] - [address]    Clinical concerns: None       Kathy Perez CMA              "

## 2020-10-27 NOTE — NURSING NOTE
Chief Complaint   Patient presents with     Blood Draw     JIC labs drawn from  by RN in lab. Vitals taken.     /85 (BP Location: Left arm, Patient Position: Sitting, Cuff Size: Adult Regular)   Pulse 81   Temp 98  F (36.7  C) (Oral)   Resp 18   Wt 81.6 kg (179 lb 12.8 oz)   SpO2 99%   BMI 25.80 kg/m      Labs drawn via right antecubital venipuncture. Pt tolerated well & checked in for next appointment.    Brea Martinez RN on 10/27/2020 at 12:27 PM

## 2020-10-27 NOTE — PROGRESS NOTES
BMT Clinic Note    Patient ID: Angel Yanez is a 60 yo man 1 year and 5 mo s/p 2nd autologous transplant for MM.     Interval history:     Guille returns for follow up. Rash has completely resolved but remains hyerpigmented.BP is better today. He resumed his lisinopril.He had a URI with a dry cough last week without fever.  Cough is better today.   COVID was negative.  No n,v,d.  No bleeding. He does not wish to get N-plate as plts are stable. He is back at work.    ROS: 8 point ROS neg unless specified above.       Physical Exam:   Blood pressure 132/85, pulse 81, temperature 98  F (36.7  C), temperature source Oral, resp. rate 18, weight 81.6 kg (179 lb 12.8 oz), SpO2 99 %.    General: NAD  HEENT:FARRAH.  Sclera anicteric.  OP clear, buccal mucosa moist  Lungs: breathing comfortably on room air, no stridor.   Skin: Faded  Brown pigmentation in the distribution of prior rash. No areas of blisters or erythema.   MSK/Extremities: Warm, well perfused. No edema  Neurologic: alert, and conversant  No line    Data:     Lab Results   Component Value Date    WBC 4.2 10/27/2020    ANEU 2.1 10/27/2020    HGB 12.4 (L) 10/27/2020    HCT 36.3 (L) 10/27/2020    PLT 75 (L) 10/27/2020     10/27/2020    POTASSIUM 4.4 10/27/2020    CHLORIDE 106 10/27/2020    CO2 28 10/27/2020    GLC 97 10/27/2020    BUN 15 10/27/2020    CR 0.91 10/27/2020    MAG 2.1 09/14/2020    INR 1.03 04/30/2020    BILITOTAL 0.3 10/27/2020    AST 20 10/27/2020    ALT 27 10/27/2020    ALKPHOS 78 10/27/2020    PROTTOTAL 7.7 10/27/2020    ALBUMIN 4.1 10/27/2020     Derm Path (9/14):  These features are subtle, but they are compatible with the clinical   impression of a drug eruption.     Assessment and Plan:     Angel Yanez is a 60 yo man 1 year and 5 mo s/p 2nd autologous transplant for MM with ongoing cytopenias.     1.  BMT/MM:   Slow engraftment; cell dose was only 0.639x10^6. (Marrow [place] - known poor cell dose as failed chemo-mobilization  1/2019.)   - day +180: CR  - PET in 8/2019 clear.   - 1 year bmbx/PET scan.  See [facility]'s complete note: Myeloma in remission, however marrow was hypocellular.    2.  HEME: Keep Hgb>8g/dL, plt >10k.   - History of persistant profound thrombocytopenia. Previous trials of promacta and prednisone not helpful.  Nplate started 3/19 with some improvement.  Plt reached a plateau on max dose N plate.  Last dose 7/14.  Given again 9/2 as plts lower. Pt does not wish to get this as plts overall improved at 75k.       3. Derm:   Diffuse rash 9/9/20 : bx consistent with drug rash. Only new medication was amoxicillin that he took before the dentist 1 week prior. Lisinopril also changed colors/formualtions when he picked it up ~1 week prior to the rash as well. Seen by Derm 9/17. Rash resolved with medrol dose pack, steroid creams, and benadryl cream.  Resumed  Lisinopril without rash reoccurance.       4.  ID: afebrile;   - EBV 6k and HHV6 neg 9/17/20  - Hx FUOs: Extensive ID work up- no etiology identified.   - Given 1 year vaccines 6/4/20, Boosters given 8/2020  -Had flu shot 10/2020 at another place    Prophy: ACV, Fluconazole        5. CV: HTN.  On lisinopril 10mg daily. Held with possible contribution to rash (See above). /90 today. He will re-trialed the lisinopril without return of rash     6.  FEN/Renal: Cr and lytes WNL     7.  Mood: Continue Paxil.     8. FUOs - Some kind of auto immune process DDX: sarcoidosis? Underwent EBUS FNA which was negative for granulomas however unlikely you could see this on just fluid aspirate. FUOs resolved with steroids and has not recurred.       RTC: 4 weeks, sooner prn. Next restaging in 5/2021    Funmi Ospina PA-C  [phone]

## 2020-10-28 LAB
EBV DNA # SPEC NAA+PROBE: 622 {COPIES}/ML
EBV DNA SPEC NAA+PROBE-LOG#: 2.8 {LOG_COPIES}/ML

## 2020-11-20 ENCOUNTER — ONCOLOGY VISIT (OUTPATIENT)
Dept: TRANSPLANT | Facility: CLINIC | Age: 60
End: 2020-11-20
Attending: PHYSICIAN ASSISTANT
Payer: COMMERCIAL

## 2020-11-20 ENCOUNTER — APPOINTMENT (OUTPATIENT)
Dept: LAB | Facility: CLINIC | Age: 60
End: 2020-11-20
Attending: PHYSICIAN ASSISTANT
Payer: COMMERCIAL

## 2020-11-20 VITALS
TEMPERATURE: 97.9 F | DIASTOLIC BLOOD PRESSURE: 79 MMHG | OXYGEN SATURATION: 96 % | BODY MASS INDEX: 25.68 KG/M2 | SYSTOLIC BLOOD PRESSURE: 134 MMHG | RESPIRATION RATE: 18 BRPM | HEART RATE: 69 BPM | WEIGHT: 179 LBS

## 2020-11-20 DIAGNOSIS — C90.01 MULTIPLE MYELOMA IN REMISSION (H): ICD-10-CM

## 2020-11-20 DIAGNOSIS — J01.90 ACUTE SINUSITIS WITH SYMPTOMS > 10 DAYS: ICD-10-CM

## 2020-11-20 DIAGNOSIS — Z94.81 STATUS POST BONE MARROW TRANSPLANT (H): Primary | ICD-10-CM

## 2020-11-20 DIAGNOSIS — I10 ESSENTIAL HYPERTENSION: ICD-10-CM

## 2020-11-20 LAB
ALBUMIN SERPL-MCNC: 3.9 G/DL (ref 3.4–5)
ALP SERPL-CCNC: 77 U/L (ref 40–150)
ALT SERPL W P-5'-P-CCNC: 29 U/L (ref 0–70)
ANION GAP SERPL CALCULATED.3IONS-SCNC: 3 MMOL/L (ref 3–14)
AST SERPL W P-5'-P-CCNC: 18 U/L (ref 0–45)
BASOPHILS # BLD AUTO: 0 10E9/L (ref 0–0.2)
BASOPHILS NFR BLD AUTO: 0.9 %
BILIRUB SERPL-MCNC: 0.4 MG/DL (ref 0.2–1.3)
BUN SERPL-MCNC: 18 MG/DL (ref 7–30)
CALCIUM SERPL-MCNC: 9 MG/DL (ref 8.5–10.1)
CHLORIDE SERPL-SCNC: 104 MMOL/L (ref 94–109)
CO2 SERPL-SCNC: 30 MMOL/L (ref 20–32)
CREAT SERPL-MCNC: 1.02 MG/DL (ref 0.66–1.25)
DIFFERENTIAL METHOD BLD: ABNORMAL
EOSINOPHIL # BLD AUTO: 0.3 10E9/L (ref 0–0.7)
EOSINOPHIL NFR BLD AUTO: 6.2 %
ERYTHROCYTE [DISTWIDTH] IN BLOOD BY AUTOMATED COUNT: 13.2 % (ref 10–15)
GFR SERPL CREATININE-BSD FRML MDRD: 78 ML/MIN/{1.73_M2}
GLUCOSE SERPL-MCNC: 90 MG/DL (ref 70–99)
HCT VFR BLD AUTO: 38.3 % (ref 40–53)
HGB BLD-MCNC: 12.5 G/DL (ref 13.3–17.7)
IMM GRANULOCYTES # BLD: 0 10E9/L (ref 0–0.4)
IMM GRANULOCYTES NFR BLD: 0.4 %
LYMPHOCYTES # BLD AUTO: 1 10E9/L (ref 0.8–5.3)
LYMPHOCYTES NFR BLD AUTO: 21.7 %
MCH RBC QN AUTO: 35.2 PG (ref 26.5–33)
MCHC RBC AUTO-ENTMCNC: 32.6 G/DL (ref 31.5–36.5)
MCV RBC AUTO: 108 FL (ref 78–100)
MONOCYTES # BLD AUTO: 0.7 10E9/L (ref 0–1.3)
MONOCYTES NFR BLD AUTO: 14.6 %
NEUTROPHILS # BLD AUTO: 2.6 10E9/L (ref 1.6–8.3)
NEUTROPHILS NFR BLD AUTO: 56.2 %
NRBC # BLD AUTO: 0 10*3/UL
NRBC BLD AUTO-RTO: 0 /100
PLATELET # BLD AUTO: 78 10E9/L (ref 150–450)
POTASSIUM SERPL-SCNC: 4.5 MMOL/L (ref 3.4–5.3)
PROT SERPL-MCNC: 7.6 G/DL (ref 6.8–8.8)
RBC # BLD AUTO: 3.55 10E12/L (ref 4.4–5.9)
SODIUM SERPL-SCNC: 138 MMOL/L (ref 133–144)
WBC # BLD AUTO: 4.7 10E9/L (ref 4–11)

## 2020-11-20 PROCEDURE — 36415 COLL VENOUS BLD VENIPUNCTURE: CPT

## 2020-11-20 PROCEDURE — 85025 COMPLETE CBC W/AUTO DIFF WBC: CPT | Performed by: PHYSICIAN ASSISTANT

## 2020-11-20 PROCEDURE — 80053 COMPREHEN METABOLIC PANEL: CPT | Performed by: PHYSICIAN ASSISTANT

## 2020-11-20 PROCEDURE — G0463 HOSPITAL OUTPT CLINIC VISIT: HCPCS

## 2020-11-20 PROCEDURE — 99214 OFFICE O/P EST MOD 30 MIN: CPT

## 2020-11-20 RX ORDER — AZITHROMYCIN 250 MG/1
TABLET, FILM COATED ORAL
Qty: 6 TABLET | Refills: 0 | Status: SHIPPED | OUTPATIENT
Start: 2020-11-20 | End: 2020-12-30

## 2020-11-20 RX ORDER — LOSARTAN POTASSIUM 25 MG/1
25 TABLET ORAL DAILY
Qty: 30 TABLET | Refills: 11 | Status: SHIPPED | OUTPATIENT
Start: 2020-11-20 | End: 2021-02-25

## 2020-11-20 ASSESSMENT — PAIN SCALES - GENERAL: PAINLEVEL: NO PAIN (0)

## 2020-11-20 NOTE — PROGRESS NOTES
BMT Clinic Note    Patient ID: Angel Yanez is a 58 yo man 1 year and 6 mo s/p 2nd autologous transplant for MM.     Interval history:     Guille returns for follow up.  He has not been here in a month. Still complaining of dry cough, some rhinorrhea. He notes cough is worse throughout the day, then improves at night. Rash from amoxicillin still hyperpigmented but not itchy did not recur with resuming lisinopril he was so annoyed with cough that he tried pred burst on his own - took 40 day 1, 30 day 2 , 20 day 3 and 4, 10 yesterday- doesn't think he feels any better. Didn't make any change. No headaches, or fevers. He was tested for covid previously about a month ago. Repeat covid test yesterday which he is waiting on the results.   ROS: 8 point ROS neg unless specified above.       Physical Exam:   Blood pressure 134/79, pulse 69, temperature 97.9  F (36.6  C), temperature source Oral, resp. rate 18, weight 81.2 kg (179 lb), SpO2 96 %.    General: NAD  HEENT:FARRAH.  Sclera anicteric.  OP clear, buccal mucosa moist  Lungs: breathing comfortably on room air, no stridor.   Skin: Faded  Brown pigmentation in the distribution of prior rash. No areas of blisters or erythema.   MSK/Extremities: Warm, well perfused. No edema  Neurologic: alert, and conversant  No line    Data:     Lab Results   Component Value Date    WBC 4.2 10/27/2020    ANEU 2.1 10/27/2020    HGB 12.4 (L) 10/27/2020    HCT 36.3 (L) 10/27/2020    PLT 75 (L) 10/27/2020     10/27/2020    POTASSIUM 4.4 10/27/2020    CHLORIDE 106 10/27/2020    CO2 28 10/27/2020    GLC 97 10/27/2020    BUN 15 10/27/2020    CR 0.91 10/27/2020    MAG 2.1 09/14/2020    INR 1.03 04/30/2020    BILITOTAL 0.3 10/27/2020    AST 20 10/27/2020    ALT 27 10/27/2020    ALKPHOS 78 10/27/2020    PROTTOTAL 7.7 10/27/2020    ALBUMIN 4.1 10/27/2020     Derm Path (9/14):  These features are subtle, but they are compatible with the clinical   impression of a drug eruption.     Assessment  and Plan:     Angel Yanez is a 58 yo man 1 year and 5 mo s/p 2nd autologous transplant for MM with ongoing cytopenias.     1.  BMT/MM:   Slow engraftment; cell dose was only 0.639x10^6. (Marrow [place] - known poor cell dose as failed chemo-mobilization 1/2019.)   - day +180: CR  - PET in 8/2019 clear.   - 1 year bmbx/PET scan.  See [facility]'s complete note: Myeloma in remission, however marrow was hypocellular.    2.  HEME: Keep Hgb>8g/dL, plt >10k.   - History of persistant profound thrombocytopenia. Previous trials of promacta and prednisone not helpful.  Nplate started 3/19 with some improvement.  Plt reached a plateau on max dose N plate.  Last dose 7/14.  Given again 9/2 as plts lower.   - Pt does not wish to get this as plts overall improved at 78k today.        3. Derm:   Diffuse rash 9/9/20 : bx consistent with drug rash. Only new medication was amoxicillin that he took before the dentist 1 week prior. Lisinopril also changed colors/formualtions when he picked it up ~1 week prior to the rash as well. Seen by Derm 9/17. Rash resolved with medrol dose pack, steroid creams, and benadryl cream.  Resumed  Lisinopril without rash reoccurance.       4.  ID: afebrile; cough  - 11/20 I suspect cough is due to lisinopril as lung clear on exam. See HTN below  - Some linger nasal congestion/rhinorhea - given jama going for 1 month recommend wait until next week if cough improved but still having nasal symptoms start Zpak.   - EBV 6k and HHV6 neg 9/17/20  - Hx FUOs: Extensive ID work up- no etiology identified.   - Given 1 year vaccines 6/4/20, Boosters given 8/2020  -Had flu shot 10/2020 at another place    Prophy: ACV, Fluconazole        5. CV: HTN.    -Stop lisinopril 10mg today- suspect ace cough. Start losarten 25mg PO daily.   - Of note hx of hives with alavide- discussed with pharm D who felt risk was low of trialing different ARB to see if he will tolerate.      6.  FEN/Renal: Cr and lytes WNL     7.   Mood: Continue Paxil.     8. FUOs - Some kind of auto immune process DDX: sarcoidosis? Underwent EBUS FNA which was negative for granulomas however unlikely you could see this on just fluid aspirate. FUOs resolved with steroids and has not recurred.       RTC: 5 weeks, sooner prn  - Patient to call if cough is not resolved with stopping lisinopril.       Myrna Marrufo PA-C  [phone]

## 2020-11-20 NOTE — LETTER
11/20/2020         RE: Angel Yanez  [address]  [facility] [address]        Dear Colleague,    Thank you for referring your patient, Angel Yanez, to the [facility] BLOOD AND MARROW TRANSPLANT PROGRAM [place]. Please see a copy of my visit note below.    BMT Clinic Note    Patient ID: Angel Yanez is a 58 yo man 1 year and 6 mo s/p 2nd autologous transplant for MM.     Interval history:     Guille returns for follow up.  He has not been here in a month. Still complaining of dry cough, some rhinorrhea. He notes cough is worse throughout the day, then improves at night. Rash from amoxicillin still hyperpigmented but not itchy did not recur with resuming lisinopril he was so annoyed with cough that he tried pred burst on his own - took 40 day 1, 30 day 2 , 20 day 3 and 4, 10 yesterday- doesn't think he feels any better. Didn't make any change. No headaches, or fevers. He was tested for covid previously about a month ago. Repeat covid test yesterday which he is waiting on the results.   ROS: 8 point ROS neg unless specified above.       Physical Exam:   Blood pressure 134/79, pulse 69, temperature 97.9  F (36.6  C), temperature source Oral, resp. rate 18, weight 81.2 kg (179 lb), SpO2 96 %.    General: NAD  HEENT:FARRAH.  Sclera anicteric.  OP clear, buccal mucosa moist  Lungs: breathing comfortably on room air, no stridor.   Skin: Faded  Brown pigmentation in the distribution of prior rash. No areas of blisters or erythema.   MSK/Extremities: Warm, well perfused. No edema  Neurologic: alert, and conversant  No line    Data:     Lab Results   Component Value Date    WBC 4.2 10/27/2020    ANEU 2.1 10/27/2020    HGB 12.4 (L) 10/27/2020    HCT 36.3 (L) 10/27/2020    PLT 75 (L) 10/27/2020     10/27/2020    POTASSIUM 4.4 10/27/2020    CHLORIDE 106 10/27/2020    CO2 28 10/27/2020    GLC 97 10/27/2020    BUN 15 10/27/2020    CR 0.91 10/27/2020    MAG 2.1 09/14/2020    INR 1.03 04/30/2020     BILITOTAL 0.3 10/27/2020    AST 20 10/27/2020    ALT 27 10/27/2020    ALKPHOS 78 10/27/2020    PROTTOTAL 7.7 10/27/2020    ALBUMIN 4.1 10/27/2020     Derm Path (9/14):  These features are subtle, but they are compatible with the clinical   impression of a drug eruption.     Assessment and Plan:     Angel Yanze is a 60 yo man 1 year and 5 mo s/p 2nd autologous transplant for MM with ongoing cytopenias.     1.  BMT/MM:   Slow engraftment; cell dose was only 0.639x10^6. (Marrow [place] - known poor cell dose as failed chemo-mobilization 1/2019.)   - day +180: CR  - PET in 8/2019 clear.   - 1 year bmbx/PET scan.  See [facility]'s complete note: Myeloma in remission, however marrow was hypocellular.    2.  HEME: Keep Hgb>8g/dL, plt >10k.   - History of persistant profound thrombocytopenia. Previous trials of promacta and prednisone not helpful.  Nplate started 3/19 with some improvement.  Plt reached a plateau on max dose N plate.  Last dose 7/14.  Given again 9/2 as plts lower.   - Pt does not wish to get this as plts overall improved at 78k today.        3. Derm:   Diffuse rash 9/9/20 : bx consistent with drug rash. Only new medication was amoxicillin that he took before the dentist 1 week prior. Lisinopril also changed colors/formualtions when he picked it up ~1 week prior to the rash as well. Seen by Derm 9/17. Rash resolved with medrol dose pack, steroid creams, and benadryl cream.  Resumed  Lisinopril without rash reoccurance.       4.  ID: afebrile; cough  - 11/20 I suspect cough is due to lisinopril as lung clear on exam. See HTN below  - Some linger nasal congestion/rhinorhea - given jama going for 1 month recommend wait until next week if cough improved but still having nasal symptoms start Zpak.   - EBV 6k and HHV6 neg 9/17/20  - Hx FUOs: Extensive ID work up- no etiology identified.   - Given 1 year vaccines 6/4/20, Boosters given 8/2020  -Had flu shot 10/2020 at another place    Prophy: ACV, Fluconazole         5. CV: HTN.    -Stop lisinopril 10mg today- suspect ace cough. Start losarten 25mg PO daily.   - Of note hx of hives with alavide- discussed with pharm D who felt risk was low of trialing different ARB to see if he will tolerate.      6.  FEN/Renal: Cr and lytes WNL     7.  Mood: Continue Paxil.     8. FUOs - Some kind of auto immune process DDX: sarcoidosis? Underwent EBUS FNA which was negative for granulomas however unlikely you could see this on just fluid aspirate. FUOs resolved with steroids and has not recurred.       RTC: 5 weeks, sooner prn  - Patient to call if cough is not resolved with stopping lisinopril.       JESICA BeltranC  [phone]          Chief Complaint   Patient presents with     Blood Draw     VPT blood draw and vitals     Venipuncture labs drawn from right arm         Again, thank you for allowing me to participate in the care of your patient.        Sincerely,        BMT Advanced Practice Provider

## 2020-11-20 NOTE — NURSING NOTE
"Oncology Rooming Note    November 20, 2020 11:49 AM   Angel Yanez is a 60 year old male who presents for:    Chief Complaint   Patient presents with     Blood Draw     VPT blood draw and vitals     RECHECK     Multiple Myeloma      Initial Vitals: /79   Pulse 69   Temp 97.9  F (36.6  C) (Oral)   Resp 18   Wt 81.2 kg (179 lb)   SpO2 96%   BMI 25.68 kg/m   Estimated body mass index is 25.68 kg/m  as calculated from the following:    Height as of 7/29/20: 1.778 m (5' 10\").    Weight as of this encounter: 81.2 kg (179 lb). Body surface area is 2 meters squared.  No Pain (0) Comment: Data Unavailable   No LMP for male patient.  Allergies reviewed: Yes  Medications reviewed: Yes    Medications: Medication refills not needed today.  Pharmacy name entered into EPIC:    [facility] - [place], [place] - [address]  [facility] - [place], [place] - [address]    Clinical concerns: None       Kathy Perez CMA              "

## 2020-11-29 ENCOUNTER — HEALTH MAINTENANCE LETTER (OUTPATIENT)
Age: 60
End: 2020-11-29

## 2020-12-29 NOTE — PROGRESS NOTES
BMT Clinic Note    Patient ID: Angel Yanez is a 58 yo man 1 year and 7 mo s/p 2nd autologous transplant for MM.     Interval history:     Guille returns for follow up.  He is feeling good.  Cough has resolved.  Afebrile.  Eating well with no N/V/D.  No pain, bleeding or rashlts.     ROS: 8 point ROS neg unless specified above.       Physical Exam:   Blood pressure 134/84, pulse 68, temperature 97.3  F (36.3  C), temperature source Tympanic, resp. rate 16, weight 82.4 kg (181 lb 9.6 oz), SpO2 98 %.    General: NAD  HEENT:FARRAH.  Sclera anicteric.  OP clear, buccal mucosa moist  Lungs:CTA.   CV:  RRR  Skin: no rash  MSK/Extremities: Warm, well perfused. No edema  Neurologic: alert, and conversant    Data:     Lab Results   Component Value Date    WBC 3.5 (L) 12/30/2020    ANEU 1.8 12/30/2020    HGB 12.4 (L) 12/30/2020    HCT 37.2 (L) 12/30/2020    PLT 66 (L) 12/30/2020     12/30/2020    POTASSIUM 4.1 12/30/2020    CHLORIDE 108 12/30/2020    CO2 28 12/30/2020    GLC 84 12/30/2020    BUN 17 12/30/2020    CR 0.95 12/30/2020    MAG 2.1 09/14/2020    INR 1.03 04/30/2020    BILITOTAL 0.4 12/30/2020    AST 21 12/30/2020    ALT 31 12/30/2020    ALKPHOS 77 12/30/2020    PROTTOTAL 7.3 12/30/2020    ALBUMIN 3.9 12/30/2020       Assessment and Plan:     Angel Yanez is a 58 yo man 1 year and 7 mo s/p 2nd autologous transplant for MM with ongoing cytopenias.     1.  BMT/MM:   Slow engraftment; cell dose was only 0.639x10^6. (Marrow [place] - known poor cell dose as failed chemo-mobilization 1/2019.)   - day +180: CR  - PET in 8/2019 clear.   - 1 year bmbx/PET scan.  See [facility]'s complete note: Myeloma in remission, however marrow was hypocellular.    2.  HEME: Keep Hgb>8g/dL, plt >10k.   - History of persistant profound thrombocytopenia. Previous trials of promacta and prednisone not helpful.  Nplate started 3/19 with some improvement.  Plt reached a plateau on max dose N plate.  Last dose 9/2.      3. ID: Afebrile, no  active infections  - Given 1 year vaccines 6/4/20, Boosters given 8/2020         -Had flu shot 10/2020     Prophy: ACV, Fluconazole      4. CV: HTN.    -Stop lisinopril due to cough. On losarten 25mg PO daily.       5.  FEN/Renal: Cr and lytes WNL     6.  Mood: Continue Paxil.    RTC 2 months    javier Manzo

## 2020-12-30 ENCOUNTER — APPOINTMENT (OUTPATIENT)
Dept: LAB | Facility: CLINIC | Age: 60
End: 2020-12-30
Attending: INTERNAL MEDICINE
Payer: COMMERCIAL

## 2020-12-30 ENCOUNTER — ONCOLOGY VISIT (OUTPATIENT)
Dept: TRANSPLANT | Facility: CLINIC | Age: 60
End: 2020-12-30
Attending: INTERNAL MEDICINE
Payer: COMMERCIAL

## 2020-12-30 VITALS
WEIGHT: 181.6 LBS | BODY MASS INDEX: 26.06 KG/M2 | DIASTOLIC BLOOD PRESSURE: 84 MMHG | TEMPERATURE: 97.3 F | SYSTOLIC BLOOD PRESSURE: 134 MMHG | RESPIRATION RATE: 16 BRPM | HEART RATE: 68 BPM | OXYGEN SATURATION: 98 %

## 2020-12-30 DIAGNOSIS — C90.01 MULTIPLE MYELOMA IN REMISSION (H): ICD-10-CM

## 2020-12-30 DIAGNOSIS — Z94.81 STATUS POST BONE MARROW TRANSPLANT (H): ICD-10-CM

## 2020-12-30 LAB
ALBUMIN SERPL-MCNC: 3.9 G/DL (ref 3.4–5)
ALP SERPL-CCNC: 77 U/L (ref 40–150)
ALT SERPL W P-5'-P-CCNC: 31 U/L (ref 0–70)
ANION GAP SERPL CALCULATED.3IONS-SCNC: 5 MMOL/L (ref 3–14)
AST SERPL W P-5'-P-CCNC: 21 U/L (ref 0–45)
BASOPHILS # BLD AUTO: 0 10E9/L (ref 0–0.2)
BASOPHILS NFR BLD AUTO: 0.3 %
BILIRUB SERPL-MCNC: 0.4 MG/DL (ref 0.2–1.3)
BUN SERPL-MCNC: 17 MG/DL (ref 7–30)
CALCIUM SERPL-MCNC: 9.1 MG/DL (ref 8.5–10.1)
CHLORIDE SERPL-SCNC: 108 MMOL/L (ref 94–109)
CO2 SERPL-SCNC: 28 MMOL/L (ref 20–32)
CREAT SERPL-MCNC: 0.95 MG/DL (ref 0.66–1.25)
DIFFERENTIAL METHOD BLD: ABNORMAL
EOSINOPHIL # BLD AUTO: 0.2 10E9/L (ref 0–0.7)
EOSINOPHIL NFR BLD AUTO: 4.3 %
ERYTHROCYTE [DISTWIDTH] IN BLOOD BY AUTOMATED COUNT: 12.6 % (ref 10–15)
GFR SERPL CREATININE-BSD FRML MDRD: 85 ML/MIN/{1.73_M2}
GLUCOSE SERPL-MCNC: 84 MG/DL (ref 70–99)
HCT VFR BLD AUTO: 37.2 % (ref 40–53)
HGB BLD-MCNC: 12.4 G/DL (ref 13.3–17.7)
IMM GRANULOCYTES # BLD: 0 10E9/L (ref 0–0.4)
IMM GRANULOCYTES NFR BLD: 0 %
LYMPHOCYTES # BLD AUTO: 0.9 10E9/L (ref 0.8–5.3)
LYMPHOCYTES NFR BLD AUTO: 26.9 %
MCH RBC QN AUTO: 35 PG (ref 26.5–33)
MCHC RBC AUTO-ENTMCNC: 33.3 G/DL (ref 31.5–36.5)
MCV RBC AUTO: 105 FL (ref 78–100)
MONOCYTES # BLD AUTO: 0.5 10E9/L (ref 0–1.3)
MONOCYTES NFR BLD AUTO: 15.6 %
NEUTROPHILS # BLD AUTO: 1.8 10E9/L (ref 1.6–8.3)
NEUTROPHILS NFR BLD AUTO: 52.9 %
NRBC # BLD AUTO: 0 10*3/UL
NRBC BLD AUTO-RTO: 0 /100
PLATELET # BLD AUTO: 66 10E9/L (ref 150–450)
POTASSIUM SERPL-SCNC: 4.1 MMOL/L (ref 3.4–5.3)
PROT SERPL-MCNC: 7.3 G/DL (ref 6.8–8.8)
RBC # BLD AUTO: 3.54 10E12/L (ref 4.4–5.9)
SODIUM SERPL-SCNC: 140 MMOL/L (ref 133–144)
WBC # BLD AUTO: 3.5 10E9/L (ref 4–11)

## 2020-12-30 PROCEDURE — 80053 COMPREHEN METABOLIC PANEL: CPT | Performed by: PHYSICIAN ASSISTANT

## 2020-12-30 PROCEDURE — 99214 OFFICE O/P EST MOD 30 MIN: CPT

## 2020-12-30 PROCEDURE — G0463 HOSPITAL OUTPT CLINIC VISIT: HCPCS

## 2020-12-30 PROCEDURE — 36415 COLL VENOUS BLD VENIPUNCTURE: CPT

## 2020-12-30 PROCEDURE — 85025 COMPLETE CBC W/AUTO DIFF WBC: CPT | Performed by: PHYSICIAN ASSISTANT

## 2020-12-30 RX ORDER — LISINOPRIL 10 MG/1
10 TABLET ORAL DAILY
COMMUNITY
Start: 2020-10-15 | End: 2020-12-30

## 2020-12-30 ASSESSMENT — PAIN SCALES - GENERAL: PAINLEVEL: NO PAIN (0)

## 2020-12-30 NOTE — NURSING NOTE
"Oncology Rooming Note    December 30, 2020 12:18 PM   Angel Yanez is a 60 year old male who presents for:    Chief Complaint   Patient presents with     Oncology Clinic Visit     multiple myeloma     Blood Draw     Labs drawn via  by RN. VS taken     Initial Vitals: /84   Pulse 68   Temp 97.3  F (36.3  C) (Tympanic)   Resp 16   Wt 82.4 kg (181 lb 9.6 oz)   SpO2 98%   BMI 26.06 kg/m   Estimated body mass index is 26.06 kg/m  as calculated from the following:    Height as of 7/29/20: 1.778 m (5' 10\").    Weight as of this encounter: 82.4 kg (181 lb 9.6 oz). Body surface area is 2.02 meters squared.  No Pain (0) Comment: Data Unavailable   No LMP for male patient.  Allergies reviewed: Yes  Medications reviewed: Yes    Medications: Medication refills not needed today.  Pharmacy name entered into EPIC:    [facility] - [place] - [address] [facility] - [place], [place] - [address]    Clinical concerns: NONE       Haven Vogt CMA            "

## 2020-12-30 NOTE — NURSING NOTE
Chief Complaint   Patient presents with     Oncology Clinic Visit     multiple myeloma     Blood Draw     Labs drawn via  by RN. VS taken     Labs drawn with vpt by rn.  Pt tolerated well.  VS taken.  Pt checked in for next appt.    Horacio Ladd RN

## 2020-12-30 NOTE — LETTER
12/30/2020         RE: Angel Yanez  [address]  [place] [address]        Dear Colleague,    Thank you for referring your patient, Angel Yanez, to the [facility] BLOOD AND MARROW TRANSPLANT PROGRAM [place]. Please see a copy of my visit note below.    BMT Clinic Note    Patient ID: Angel Yanez is a 60 yo man 1 year and 7 mo s/p 2nd autologous transplant for MM.     Interval history:     Guille returns for follow up.  He is feeling good.  Cough has resolved.  Afebrile.  Eating well with no N/V/D.  No pain, bleeding or rashlts.     ROS: 8 point ROS neg unless specified above.       Physical Exam:   Blood pressure 134/84, pulse 68, temperature 97.3  F (36.3  C), temperature source Tympanic, resp. rate 16, weight 82.4 kg (181 lb 9.6 oz), SpO2 98 %.    General: NAD  HEENT:FARRAH.  Sclera anicteric.  OP clear, buccal mucosa moist  Lungs:CTA.   CV:  RRR  Skin: no rash  MSK/Extremities: Warm, well perfused. No edema  Neurologic: alert, and conversant    Data:     Lab Results   Component Value Date    WBC 3.5 (L) 12/30/2020    ANEU 1.8 12/30/2020    HGB 12.4 (L) 12/30/2020    HCT 37.2 (L) 12/30/2020    PLT 66 (L) 12/30/2020     12/30/2020    POTASSIUM 4.1 12/30/2020    CHLORIDE 108 12/30/2020    CO2 28 12/30/2020    GLC 84 12/30/2020    BUN 17 12/30/2020    CR 0.95 12/30/2020    MAG 2.1 09/14/2020    INR 1.03 04/30/2020    BILITOTAL 0.4 12/30/2020    AST 21 12/30/2020    ALT 31 12/30/2020    ALKPHOS 77 12/30/2020    PROTTOTAL 7.3 12/30/2020    ALBUMIN 3.9 12/30/2020       Assessment and Plan:     Angel Yanez is a 60 yo man 1 year and 7 mo s/p 2nd autologous transplant for MM with ongoing cytopenias.     1.  BMT/MM:   Slow engraftment; cell dose was only 0.639x10^6. (Marrow [place] - known poor cell dose as failed chemo-mobilization 1/2019.)   - day +180: CR  - PET in 8/2019 clear.   - 1 year bmbx/PET scan.  See [facility] complete note: Myeloma in remission, however marrow was  hypocellular.    2.  HEME: Keep Hgb>8g/dL, plt >10k.   - History of persistant profound thrombocytopenia. Previous trials of promacta and prednisone not helpful.  Nplate started 3/19 with some improvement.  Plt reached a plateau on max dose N plate.  Last dose 9/2.      3. ID: Afebrile, no active infections  - Given 1 year vaccines 6/4/20, Boosters given 8/2020         -Had flu shot 10/2020     Prophy: ACV, Fluconazole      4. CV: HTN.    -Stop lisinopril due to cough. On losarten 25mg PO daily.       5.  FEN/Renal: Cr and lytes WNL     6.  Mood: Continue Paxil.    RTC 2 months    javier Manzo        Again, thank you for allowing me to participate in the care of your patient.        Sincerely,        BMT Advanced Practice Provider

## 2021-02-25 ENCOUNTER — APPOINTMENT (OUTPATIENT)
Dept: LAB | Facility: CLINIC | Age: 61
End: 2021-02-25
Attending: INTERNAL MEDICINE
Payer: COMMERCIAL

## 2021-02-25 ENCOUNTER — ONCOLOGY VISIT (OUTPATIENT)
Dept: TRANSPLANT | Facility: CLINIC | Age: 61
End: 2021-02-25
Attending: INTERNAL MEDICINE
Payer: COMMERCIAL

## 2021-02-25 VITALS
OXYGEN SATURATION: 97 % | RESPIRATION RATE: 16 BRPM | SYSTOLIC BLOOD PRESSURE: 145 MMHG | WEIGHT: 186.3 LBS | TEMPERATURE: 97.9 F | HEART RATE: 63 BPM | BODY MASS INDEX: 26.73 KG/M2 | DIASTOLIC BLOOD PRESSURE: 94 MMHG

## 2021-02-25 DIAGNOSIS — C90.01 MULTIPLE MYELOMA IN REMISSION (H): ICD-10-CM

## 2021-02-25 DIAGNOSIS — L40.50 PSORIATIC ARTHRITIS (H): Primary | ICD-10-CM

## 2021-02-25 DIAGNOSIS — I10 ESSENTIAL HYPERTENSION: ICD-10-CM

## 2021-02-25 DIAGNOSIS — Z94.81 STATUS POST BONE MARROW TRANSPLANT (H): ICD-10-CM

## 2021-02-25 LAB
ALBUMIN SERPL-MCNC: 4.2 G/DL (ref 3.4–5)
ALP SERPL-CCNC: 72 U/L (ref 40–150)
ALT SERPL W P-5'-P-CCNC: 37 U/L (ref 0–70)
ANION GAP SERPL CALCULATED.3IONS-SCNC: 5 MMOL/L (ref 3–14)
AST SERPL W P-5'-P-CCNC: 21 U/L (ref 0–45)
BASOPHILS # BLD AUTO: 0 10E9/L (ref 0–0.2)
BASOPHILS NFR BLD AUTO: 1 %
BILIRUB SERPL-MCNC: 0.4 MG/DL (ref 0.2–1.3)
BUN SERPL-MCNC: 11 MG/DL (ref 7–30)
CALCIUM SERPL-MCNC: 9.2 MG/DL (ref 8.5–10.1)
CHLORIDE SERPL-SCNC: 108 MMOL/L (ref 94–109)
CO2 SERPL-SCNC: 28 MMOL/L (ref 20–32)
CREAT SERPL-MCNC: 0.95 MG/DL (ref 0.66–1.25)
DIFFERENTIAL METHOD BLD: ABNORMAL
EOSINOPHIL # BLD AUTO: 0.1 10E9/L (ref 0–0.7)
EOSINOPHIL NFR BLD AUTO: 3.5 %
ERYTHROCYTE [DISTWIDTH] IN BLOOD BY AUTOMATED COUNT: 12.7 % (ref 10–15)
GFR SERPL CREATININE-BSD FRML MDRD: 85 ML/MIN/{1.73_M2}
GLUCOSE SERPL-MCNC: 88 MG/DL (ref 70–99)
HCT VFR BLD AUTO: 38.6 % (ref 40–53)
HGB BLD-MCNC: 13 G/DL (ref 13.3–17.7)
IMM GRANULOCYTES # BLD: 0 10E9/L (ref 0–0.4)
IMM GRANULOCYTES NFR BLD: 0.3 %
LYMPHOCYTES # BLD AUTO: 1 10E9/L (ref 0.8–5.3)
LYMPHOCYTES NFR BLD AUTO: 30.2 %
MAGNESIUM SERPL-MCNC: 2.3 MG/DL (ref 1.6–2.3)
MCH RBC QN AUTO: 35.7 PG (ref 26.5–33)
MCHC RBC AUTO-ENTMCNC: 33.7 G/DL (ref 31.5–36.5)
MCV RBC AUTO: 106 FL (ref 78–100)
MONOCYTES # BLD AUTO: 0.6 10E9/L (ref 0–1.3)
MONOCYTES NFR BLD AUTO: 17.5 %
NEUTROPHILS # BLD AUTO: 1.5 10E9/L (ref 1.6–8.3)
NEUTROPHILS NFR BLD AUTO: 47.5 %
NRBC # BLD AUTO: 0 10*3/UL
NRBC BLD AUTO-RTO: 0 /100
PLATELET # BLD AUTO: 79 10E9/L (ref 150–450)
POTASSIUM SERPL-SCNC: 4.2 MMOL/L (ref 3.4–5.3)
PROT SERPL-MCNC: 7.5 G/DL (ref 6.8–8.8)
RBC # BLD AUTO: 3.64 10E12/L (ref 4.4–5.9)
SODIUM SERPL-SCNC: 140 MMOL/L (ref 133–144)
WBC # BLD AUTO: 3.2 10E9/L (ref 4–11)

## 2021-02-25 PROCEDURE — G0463 HOSPITAL OUTPT CLINIC VISIT: HCPCS

## 2021-02-25 PROCEDURE — 83735 ASSAY OF MAGNESIUM: CPT | Performed by: NURSE PRACTITIONER

## 2021-02-25 PROCEDURE — 80053 COMPREHEN METABOLIC PANEL: CPT | Performed by: NURSE PRACTITIONER

## 2021-02-25 PROCEDURE — 99214 OFFICE O/P EST MOD 30 MIN: CPT

## 2021-02-25 PROCEDURE — 85025 COMPLETE CBC W/AUTO DIFF WBC: CPT | Performed by: NURSE PRACTITIONER

## 2021-02-25 PROCEDURE — 36415 COLL VENOUS BLD VENIPUNCTURE: CPT

## 2021-02-25 RX ORDER — LOSARTAN POTASSIUM 50 MG/1
50 TABLET ORAL DAILY
Qty: 30 TABLET | Refills: 11 | Status: SHIPPED | OUTPATIENT
Start: 2021-02-25 | End: 2021-11-18

## 2021-02-25 RX ORDER — CLINDAMYCIN HCL 300 MG
600 CAPSULE ORAL
Qty: 2 CAPSULE | Refills: 2 | Status: SHIPPED | OUTPATIENT
Start: 2021-02-25 | End: 2021-04-13

## 2021-02-25 ASSESSMENT — PAIN SCALES - GENERAL: PAINLEVEL: NO PAIN (0)

## 2021-02-25 NOTE — NURSING NOTE
Chief Complaint   Patient presents with     Blood Draw     Labs drawn via  by rn in lab. VS taken.     Labs collected from venipuncture by RN. Vitals taken. Checked in for appointment(s).    Sunday Vyas RN

## 2021-02-25 NOTE — NURSING NOTE
"Oncology Rooming Note    February 25, 2021 11:46 AM   Angel Yanez is a 60 year old male who presents for:    Chief Complaint   Patient presents with     Blood Draw     Labs drawn via  by rn in lab. VS taken.     Oncology Clinic Visit     multiple myeloma     Initial Vitals: BP (!) 145/94 (BP Location: Right arm, Patient Position: Sitting, Cuff Size: Adult Regular)   Pulse 63   Temp 97.9  F (36.6  C) (Oral)   Resp 16   Wt 84.5 kg (186 lb 4.8 oz)   SpO2 97%   BMI 26.73 kg/m   Estimated body mass index is 26.73 kg/m  as calculated from the following:    Height as of 7/29/20: 1.778 m (5' 10\").    Weight as of this encounter: 84.5 kg (186 lb 4.8 oz). Body surface area is 2.04 meters squared.  No Pain (0) Comment: Data Unavailable   No LMP for male patient.  Allergies reviewed: Yes  Medications reviewed: Yes    Medications: Medication refills not needed today.  Pharmacy name entered into EPIC:    [facility] - [place], [place] - [address]  [facility] - [place], [place] - [address]    Clinical concerns: none       Haven Vogt CMA            "

## 2021-02-25 NOTE — LETTER
2/25/2021         RE: Angel Yanez  [address]  [facility] [place] [facility] [address]        Dear Colleague,    Thank you for referring your patient, Angel Yanez, to the [facility] BLOOD AND MARROW TRANSPLANT PROGRAM [place]. Please see a copy of my visit note below.    BMT Clinic Note    Patient ID: Angel Yanez is a 60 yo man 1 year and 9 mo s/p 2nd autologous transplant for MM.     Interval history:     Guille returns for follow up.  He is feeling good. Afebrile. Feeling well. He is having a lot of joint pain - mostly hands. He denies any back pain or concern of active myeloma. Notes this is different - hx of psoriatic arthritis - wondering if we had rheumatologist here that we would recommend. CUrrently just managing trigger finger and joint pain with prn tylenol. No bleeding, no infectious symptoms.     ROS: 8 point ROS neg unless specified above.       Physical Exam:   Blood pressure (!) 145/94, pulse 63, temperature 97.9  F (36.6  C), temperature source Oral, resp. rate 16, weight 84.5 kg (186 lb 4.8 oz), SpO2 97 %.  Repeat manual BP: 142/80.   General: NAD  HEENT:FARRAH.  Sclera anicteric.  OP clear, buccal mucosa moist  Lungs:CTA.   CV:  RRR  Skin: no rash  MSK/Extremities: Warm, well perfused. No edema  Neurologic: alert, and conversant    Data:     Lab Results   Component Value Date    WBC 3.2 (L) 02/25/2021    ANEU 1.5 (L) 02/25/2021    HGB 13.0 (L) 02/25/2021    HCT 38.6 (L) 02/25/2021    PLT 79 (L) 02/25/2021     02/25/2021    POTASSIUM 4.2 02/25/2021    CHLORIDE 108 02/25/2021    CO2 28 02/25/2021    GLC 88 02/25/2021    BUN 11 02/25/2021    CR 0.95 02/25/2021    MAG 2.3 02/25/2021    INR 1.03 04/30/2020    BILITOTAL 0.4 02/25/2021    AST 21 02/25/2021    ALT 37 02/25/2021    ALKPHOS 72 02/25/2021    PROTTOTAL 7.5 02/25/2021    ALBUMIN 4.2 02/25/2021       Assessment and Plan:     Angel Yanez is a 60 yo man 1 year and 7 mo s/p 2nd autologous transplant for MM with ongoing  cytopenias.     1.  BMT/MM:   Slow engraftment; cell dose was only 0.639x10^6. (Marrow [place] - known poor cell dose as failed chemo-mobilization 1/2019.)   - day +180: CR  - PET in 8/2019 clear.   - 1 year bmbx/PET scan.  See [facility]'s complete note: Myeloma in remission, however marrow was hypocellular.  - due to 2 year restaging 2/25/21.     2.  HEME: Keep Hgb>8g/dL, plt >10k.   - History of persistant profound thrombocytopenia. Previous trials of promacta and prednisone not helpful.  Nplate started 3/19 with some improvement.  Plt reached a plateau on max dose N plate.  Last dose 9/2.      3. ID: Afebrile, no active infections  - No need for dental prophy per BMT. HOwever pt told to use given hx of hip replacement. Recent rash with amoxicilling. Script for clindamycin 600mg PO x1 30 minutes prior to dental work. Script + 2 refills sent today.   - Given 1 year vaccines 6/4/20, Boosters given 8/2020         -Had flu shot 10/2020     Prophy: ACV, Fluconazole      4. CV: HTN.  More hypertensive last 2 visits  -Stop lisinopril due to cough. On losarten 25mg PO daily, Increase to 50mg Po daily today.      5.  FEN/Renal: Cr and lytes WNL     6.  Mood: Continue Paxil.    RTC 5/2021 for 2 year restaging. Blood counts stably low. Call if pursues rheumatalogic treatment. Recommend no NSAIDS except celebrex due to thrombocytopenia. If methotrexate recommend at least q 2 week labs to ensure counts are stable.        Myrna Marrufo PA-C  [phone]      Again, thank you for allowing me to participate in the care of your patient.        Sincerely,        BMT Advanced Practice Provider

## 2021-02-25 NOTE — PROGRESS NOTES
BMT Clinic Note    Patient ID: Angel Yanez is a 58 yo man 1 year and 9 mo s/p 2nd autologous transplant for MM.     Interval history:     Guille returns for follow up.  He is feeling good. Afebrile. Feeling well. He is having a lot of joint pain - mostly hands. He denies any back pain or concern of active myeloma. Notes this is different - hx of psoriatic arthritis - wondering if we had rheumatologist here that we would recommend. CUrrently just managing trigger finger and joint pain with prn tylenol. No bleeding, no infectious symptoms.     ROS: 8 point ROS neg unless specified above.       Physical Exam:   Blood pressure (!) 145/94, pulse 63, temperature 97.9  F (36.6  C), temperature source Oral, resp. rate 16, weight 84.5 kg (186 lb 4.8 oz), SpO2 97 %.  Repeat manual BP: 142/80.   General: NAD  HEENT:FARRAH.  Sclera anicteric.  OP clear, buccal mucosa moist  Lungs:CTA.   CV:  RRR  Skin: no rash  MSK/Extremities: Warm, well perfused. No edema  Neurologic: alert, and conversant    Data:     Lab Results   Component Value Date    WBC 3.2 (L) 02/25/2021    ANEU 1.5 (L) 02/25/2021    HGB 13.0 (L) 02/25/2021    HCT 38.6 (L) 02/25/2021    PLT 79 (L) 02/25/2021     02/25/2021    POTASSIUM 4.2 02/25/2021    CHLORIDE 108 02/25/2021    CO2 28 02/25/2021    GLC 88 02/25/2021    BUN 11 02/25/2021    CR 0.95 02/25/2021    MAG 2.3 02/25/2021    INR 1.03 04/30/2020    BILITOTAL 0.4 02/25/2021    AST 21 02/25/2021    ALT 37 02/25/2021    ALKPHOS 72 02/25/2021    PROTTOTAL 7.5 02/25/2021    ALBUMIN 4.2 02/25/2021       Assessment and Plan:     Angel Yanez is a 58 yo man 1 year and 7 mo s/p 2nd autologous transplant for MM with ongoing cytopenias.     1.  BMT/MM:   Slow engraftment; cell dose was only 0.639x10^6. (Marrow [place] - known poor cell dose as failed chemo-mobilization 1/2019.)   - day +180: CR  - PET in 8/2019 clear.   - 1 year bmbx/PET scan.  See [facility]'s complete note: Myeloma in remission, however marrow  was hypocellular.  - due to 2 year restaging 2/25/21.     2.  HEME: Keep Hgb>8g/dL, plt >10k.   - History of persistant profound thrombocytopenia. Previous trials of promacta and prednisone not helpful.  Nplate started 3/19 with some improvement.  Plt reached a plateau on max dose N plate.  Last dose 9/2.      3. ID: Afebrile, no active infections  - No need for dental prophy per BMT. HOwever pt told to use given hx of hip replacement. Recent rash with amoxicilling. Script for clindamycin 600mg PO x1 30 minutes prior to dental work. Script + 2 refills sent today.   - Given 1 year vaccines 6/4/20, Boosters given 8/2020         -Had flu shot 10/2020     Prophy: ACV, Fluconazole      4. CV: HTN.  More hypertensive last 2 visits  -Stop lisinopril due to cough. On losarten 25mg PO daily, Increase to 50mg Po daily today.      5.  FEN/Renal: Cr and lytes WNL     6.  Mood: Continue Paxil.    RTC 5/2021 for 2 year restaging. Blood counts stably low. Call if pursues rheumatalogic treatment. Recommend no NSAIDS except celebrex due to thrombocytopenia. If methotrexate recommend at least q 2 week labs to ensure counts are stable.        Myrna Marrufo PA-C  [phone]

## 2021-03-03 NOTE — PROGRESS NOTES
Infusion Nursing Note:  Angel Yanez presents today for add-on transfusion.    Patient seen by provider today: Yes: Mony Pitts   present during visit today: Not Applicable.    Note: Labs were monitored.    Intravenous Access:  Hsieh.    Treatment Conditions:  Patient received two units of red blood cells for a Hgb of 7.3.  He received one unit of platelets for a platelet count of 15.  Note:  Patient refused Tylenol and Benadryl pre-meds prior to blood products today.  Patient received G-CSF injection in his right arm.      Post Infusion Assessment:  Patient tolerated transfusions and injection without incident.       Discharge Plan:   Patient discharged in stable condition accompanied by: self.    CLARA ELLSWORTH RN                        
[phone]

## 2021-03-11 ENCOUNTER — IMMUNIZATION (OUTPATIENT)
Dept: NURSING | Facility: CLINIC | Age: 61
End: 2021-03-11
Payer: COMMERCIAL

## 2021-03-11 PROCEDURE — 0001A PR COVID VAC PFIZER DIL RECON 30 MCG/0.3 ML IM: CPT

## 2021-03-11 PROCEDURE — 91300 PR COVID VAC PFIZER DIL RECON 30 MCG/0.3 ML IM: CPT

## 2021-03-12 DIAGNOSIS — D80.1 HYPOGAMMAGLOBULINEMIA (H): Primary | ICD-10-CM

## 2021-03-17 LAB — LAB SCANNED RESULT: NORMAL

## 2021-04-01 ENCOUNTER — IMMUNIZATION (OUTPATIENT)
Dept: NURSING | Facility: CLINIC | Age: 61
End: 2021-04-01
Attending: PHARMACIST
Payer: COMMERCIAL

## 2021-04-01 PROCEDURE — 91300 PR COVID VAC PFIZER DIL RECON 30 MCG/0.3 ML IM: CPT

## 2021-04-01 PROCEDURE — 0002A PR COVID VAC PFIZER DIL RECON 30 MCG/0.3 ML IM: CPT

## 2021-04-05 ENCOUNTER — TELEPHONE (OUTPATIENT)
Dept: TRANSPLANT | Facility: CLINIC | Age: 61
End: 2021-04-05

## 2021-04-05 NOTE — TELEPHONE ENCOUNTER
"Pt called experiencing symptoms, states believes is having reaction to new blood pressure medication, answered no to all COVID-19 screening questions. I informed pt that I will page an МАРИНА to give Guille a call @ [phone] and if pt does not get a call back within half an hour to call [phone] again. Pt agreed. МАРИНА paged.    Pt called today to report the following:    On 2/26 his Losartan was increased from 25 to 50mg daily  On 3/11 he received his first Covid vaccine & subsequently developed a \"sinus infection\" that lasted 2-3 weeks  On 3/15 he went to the dentist & took an antibiotic prior to the visit  On 3/26 he started having freqeunt, small stools, but not on a daily basis.      He thinks all of these symptoms are due to the increased dose of Losartan.  I think it is unlikely to be related to the Losartan dose increase.  I instructed him to decrease his Losartan back down to 25mg daily & check his BP a few times in the next week.  If his BP is high on the lower dose we could try Metoprolol instead.      With regard to his diarrhea, he is eating well with no N/V & no fever or abd cramping.  It sounds like his stools are not watery .  He will try taking Imodium    Karla Friedheim  "

## 2021-04-09 NOTE — NURSING NOTE
Oncology Rooming Note    Vitals: WDL  Allergies Reviewed: Yes  Medication Reviewed: Yes  Medication Refills Needed: No  Clinical Concerns: None.  Patient has no complaints of pain.  Patient has no complaints of nausea.  Patient received GCSF subcutaneous x 1 in the right abdomen.  Site is clean dry and intact.  Marry Astorga RN     no

## 2021-04-13 ENCOUNTER — CARE COORDINATION (OUTPATIENT)
Dept: TRANSPLANT | Facility: CLINIC | Age: 61
End: 2021-04-13

## 2021-04-13 DIAGNOSIS — Z94.81 STATUS POST BONE MARROW TRANSPLANT (H): Primary | ICD-10-CM

## 2021-04-13 RX ORDER — CEPHALEXIN 500 MG/1
2000 CAPSULE ORAL
Qty: 4 CAPSULE | Refills: 3 | Status: SHIPPED | OUTPATIENT
Start: 2021-04-13 | End: 2022-08-04

## 2021-04-20 NOTE — NURSING NOTE
"Angel Yanez's goals for this visit include: discuss psoriatic arthritis, post bone marrow transplant patient  He requests these members of his care team be copied on today's visit information: n/a    PCP: Ayana Love    Referring Provider:  Jadyn Marrufo PA-C  [address] [facility] [address]  [place] [address]    /84 (BP Location: Left arm)   Pulse 75   Temp 96.9  F (36.1  C) (Oral)   Ht 1.803 m (5' 11\")   Wt 83.1 kg (183 lb 3.2 oz)   SpO2 100%   BMI 25.55 kg/m      Do you need any medication refills at today's visit? None      GENE Crandall  [facility]     ALESSIO signed for [facility].   "

## 2021-04-21 NOTE — PROGRESS NOTES
RHEUMATOLOGY INITIAL CONSULT NOTE    Chief Complaint:    Chief Complaint   Patient presents with     Consult     discuss psoriatic arthritis, post bone marrow transplant patient, patient's previously seen at [facility] Dr. Angel Lo       Reason for consult: Jadyn Marrufo from BMT has requested consultation for this patient for arthralgia    History of Present Illness:    Angel Yanez is a 60 year old male with pmhx MM s/p 2nd autologous BMT 5/2019, psoriasis, PsA, GERD, R LUMA, L hip OA, is referred to rheumatology clinic for arthralgia.     He reports being diagnosed with PsA in 2003 -reports presentation with finger and toe swelling. He was seen at [facility] at that time. He was started on methotrexate but had cytopenia, which was thought to be 2/2 methotrexate but he ended up being diagnosed with MM. He underwent BMT around 2005.  He established with Dr Angel Lo at [facility]. I do not have prior rheumatology records today. In 2010, he was restarted on methotrexate. Around 2017, he was found to have recurrence of MM and underwent BMT in 2019. Last time he was on methotrexate was in 2018. He does not remember for sure but the max dose he was on was 12.5 mg weekly. He varied between 10-12.5 mg weekly. Was never on biologic therapy or any other medication other than methotrexate.     Currently, he does not have many joint related concerns. He has been experiencing some locking sensation over the R 3rd MCP when he tries to grab things. He has been doing some exercises at home, which have helped. Occasionally, he gets pain over the bottom of the feet. His psoriasis is doing well. He did have a flare after the first transplant. Areas of psoriasis include scalp, tibia, back. He uses topical ointment as needed. He saw dermatology 9/2020 for a drug rash but has not been following regularly for psoriasis. Denies major joint stiffness in AM.  Arthralgia is not prevalent on a regular basis, is weather dependent. Denies back pain. No hx of axial involvement. He tries to exercise regularly. Denies enthesitis. He reports being diagnosed with iritis ~5 years ago, which was managed with topical steroids. Last episode was ~5 years ago.     Denies fevers, chills, weight loss, night sweats, vision changes, eye pain/redness, dry eyes, dry mouth, oral/nasal ulcers, hair loss/thinning, rash, raynaud's, cough, SOB, pleurisy, chest pain, edema, heartburn, difficulty swallowing, abdominal pain, diarrhea, hematochezia, melena, dysuria, recurrent genital ulcers, back pain, enthesitis, dactylitis, numbness, weakness, tingling. No hx of IBD. No hx of blood clots.       Past Medical History:    Past Medical History:   Diagnosis Date     GERD (gastroesophageal reflux disease)      H/O autologous stem cell transplant (H) 02/2005     Hyperlipidemia      Multiple myeloma (H) 2004     PONV (postoperative nausea and vomiting)      Psoriasis      Psoriatic arthritis (H)      Past Surgical History:   Past Surgical History:   Procedure Laterality Date     ARTHROPLASTY HIP       BIOPSY LYMPH NODE AXILLA N/A 11/15/2019    Procedure: Right axillary lymph node biopsy;  Surgeon: Reese Irving MD;  Location: [facility] OR     COLONOSCOPY       ENDOBRONCHIAL ULTRASOUND FLEXIBLE N/A 12/16/2019    Procedure: flexible bronchoscopy, endobronchial ultrasound;  Surgeon: Kyaw Escudero MD;  Location: [facility] OR     HERNIA REPAIR       IR CVC TUNNEL PLACEMENT > 5 YRS OF AGE  1/22/2019     IR CVC TUNNEL PLACEMENT > 5 YRS OF AGE  5/16/2019     IR CVC TUNNEL REMOVAL LEFT  2/20/2019     IR CVC TUNNEL REMOVAL RIGHT  7/23/2019     IR FOLLOW UP VISIT OUTPATIENT  1/24/2019     IR LYMPH NODE BIOPSY  10/18/2019     ORTHOPEDIC SURGERY       PROCURE BONE MARROW N/A 5/14/2019    Procedure: Bone Marrow [place];  Surgeon: Antwan Erickson MD;  Location: [facility] OR     TRANSPLANT       Family History:   Denies  family hx of RA, SLE, Sjogren's syndrome, scleroderma, PsA, ankylosing spondylitis, vasculitis, gout, pseudogout.    Father and grandfather: ?psoriasis     Social History:   Alcohol use: on average, couple beers a week  Tobacco use: quit 35 years ago  Occupational hx: works in mechanical design for [employer]    Allergies   Allergen Reactions     Amoxicillin Hives     Lisinopril Cough     Avalide Hives     Chlorhexidine Itching     Chloroxylenol Rash     Technicare solution     Lorazepam Hives     Moxifloxacin Hives      Immunization History   Administered Date(s) Administered     COVID-19,PF,Pfizer 03/11/2021, 04/01/2021     DTaP / Hep B / IPV 06/04/2020, 08/19/2020     Flu, Unspecified 10/15/2020     Hib (PRP-T) 06/04/2020, 08/19/2020     Influenza (IIV3) PF 12/20/2013     Influenza Vaccine IM > 6 months Valent IIV4 11/26/2019     Influenza Vaccine, 6+MO IM (QUADRIVALENT W/PRESERVATIVES) 10/08/2015, 10/26/2016, 11/04/2017     Pneumo Conj 13-V (2010&after) 06/04/2020, 08/19/2020     TD (ADULT, 7+) 12/11/1996     TDAP Vaccine (Boostrix) 10/08/2015, 07/15/2019     Tetanus 12/11/1996     Zoster vaccine recombinant adjuvanted (SHINGRIX) 06/04/2020, 08/19/2020     Medications:  Current Outpatient Medications   Medication Sig Dispense Refill     acetaminophen (TYLENOL) 325 MG tablet Take 1-2 tablets (325-650 mg) by mouth every 4 hours as needed for fever       calcium carbonate (CALCIUM CARBONATE) 600 MG tablet Take 2 tablets by mouth daily        cephALEXin (KEFLEX) 500 MG capsule Take 4 capsules (2,000 mg) by mouth once as needed (prophylaxis for dental hygiene) Take 30 minutes prior to dental hygiene appt 4 capsule 3     Cholecalciferol (VITAMIN D3) 2000 units CAPS Take 2,000 Units by mouth daily        losartan (COZAAR) 50 MG tablet Take 1 tablet (50 mg) by mouth daily 30 tablet 11     PARoxetine (PAXIL) 20 MG tablet Take 1 tablet (20 mg) by mouth every morning 90 tablet 4         PHYSICAL EXAMINATION:   Vital signs:  BP  "128/84 (BP Location: Left arm)   Pulse 75   Temp 96.9  F (36.1  C) (Oral)   Ht 1.803 m (5' 11\")   Wt 83.1 kg (183 lb 3.2 oz)   SpO2 100%   BMI 25.55 kg/m      MSK: b/l 1st MCP hypertrophy/subluxation, +R 3rd PIP hypertrophy without synovitis, +R 2nd PIP hypertrophy with hyperextension of R 2nd DIP (prior injury per pt), no knee effusions, no enthesitis, no SI joint tenderness  Skin: no active psoriatic lesions, +patches of hyperpigmentation over b/l tibia -from chronic psoriasis per pt  HEENT: MMM, no oral ulcers/lesions  CV: RRR  Pulm: CTAB, non-labored respirations, no c/r/w  GI: +BS, soft, no TTP  Neuro: A&O x3, no focal deficits    Labs:      I have reviewed all pertinent investigations including labs, including outside records if relevant    RF/CCP  Recent Labs   Lab Test 11/10/19  1118 11/09/19  1800   CCPIGG 2  --    RHF  --  <20     BINU  Recent Labs   Lab Test 11/09/19  1800   SHAW Negative     RNP/Sm/SSA/SSB  Recent Labs   Lab Test 11/11/19  0854   JOIGG <0.2     dsDNA  Recent Labs   Lab Test 11/10/19  1118   DNA 3     Send-out Labs  Recent Labs   Lab Test 11/13/19  1402 11/12/19  0801 11/10/19  1950 06/11/19  0918   SRESLT SEE NOTE SEE NOTE SEE NOTE SEE NOTE*   STSTNM HHV8 PCR ITRACONAZOLE Itraconazole KAPPA LAMBDA ESVIN FREE LIGHT CHAIN   STSTCD 2,013,089 98,519 98,519 55,167   SSPTYP Plasma Serum EDTA PLASMA SERUM     ANCA  Recent Labs   Lab Test 11/12/19  1738   ANCAT <1:10   ANCAP The ANCA IFA is <1:10.  No further testing will be performed.     IgG  Recent Labs   Lab Test 05/19/20  1323 04/30/20  1458 01/07/20  0937 11/13/19  0440    726 833 714   IGG1  --   --   --  424   IGG2  --   --   --  156*   IGG3  --   --   --  58   IGG4  --   --   --  13   IGA 26* 29* 74*  --    IGM 59 65 37  --      CBC  Recent Labs   Lab Test 02/25/21  1134 12/30/20  1214 11/20/20  1121   WBC 3.2* 3.5* 4.7   RBC 3.64* 3.54* 3.55*   HGB 13.0* 12.4* 12.5*   HCT 38.6* 37.2* 38.3*   * 105* 108*   RDW 12.7 " 12.6 13.2   PLT 79* 66* 78*   MCH 35.7* 35.0* 35.2*   MCHC 33.7 33.3 32.6   NEUTROPHIL 47.5 52.9 56.2   LYMPH 30.2 26.9 21.7   MONOCYTE 17.5 15.6 14.6   EOSINOPHIL 3.5 4.3 6.2   BASOPHIL 1.0 0.3 0.9   ANEU 1.5* 1.8 2.6   ALYM 1.0 0.9 1.0   NEGAR 0.6 0.5 0.7   AEOS 0.1 0.2 0.3   ABAS 0.0 0.0 0.0     CMP  Recent Labs   Lab Test 02/25/21  1134 12/30/20  1214 11/20/20  1121 10/27/20  1309 10/01/20  1205 09/17/20  1500    140 138 138 137 139   POTASSIUM 4.2 4.1 4.5 4.4 4.3 4.4   CHLORIDE 108 108 104 106 104 103   CO2 28 28 30 28 28 30   ANIONGAP 5 5 3 3 5 6   GLC 88 84 90 97 97 92   BUN 11 17 18 15 18 18   CR 0.95 0.95 1.02 0.91 1.00 0.90   GFRESTIMATED 85 85 78 >90 80 >90   GFRESTBLACK >90 >90 >90 >90 >90 >90   ZHEN 9.2 9.1 9.0 9.0 9.2 9.1   BILITOTAL 0.4 0.4 0.4 0.3  --  0.5   ALBUMIN 4.2 3.9 3.9 4.1  --  4.0   PROTTOTAL 7.5 7.3 7.6 7.7  --  8.0   ALKPHOS 72 77 77 78  --  72   AST 21 21 18 20  --  17   ALT 37 31 29 27  --  42     HgA1c  Recent Labs   Lab Test 05/21/20  1258   A1C 4.8     Uric Acid  Recent Labs   Lab Test 05/19/20  1323 11/06/19  1051 08/23/19  1301 05/16/19  1140 05/06/19  0936 01/23/19  0259   URIC 5.6 3.7 4.2 4.6 4.6 4.4     Iron Studies  Recent Labs   Lab Test 12/09/19  2138 11/11/19  0854 10/22/19  0355   ARISTEO 2,835* 1,978* 1,715*     Calcium/VitaminD  Recent Labs   Lab Test 02/25/21  1134 12/30/20  1214 11/20/20  1121 05/19/20  1323 05/19/20  1323 11/06/19  1051 11/06/19  1051 05/06/19  0936 05/06/19  0936   ZHEN 9.2 9.1 9.0   < > 9.4   < > 9.0   < > 9.0   D3VIT  --   --   --   --  60  --  46  --  51   VITDT  --   --   --   --   --   --   --   --  46    < > = values in this interval not displayed.     ESR/CRP  Recent Labs   Lab Test 11/10/19  0413 09/25/19  1022   *  --    .0* 160.0*     TSH/T4  Recent Labs   Lab Test 05/21/20  1258 10/29/19  0921   TSH 0.48 0.65     Lipid Panel  Recent Labs   Lab Test 12/09/19  2138 11/11/19  0854 10/22/19  0355   TRIG 156* 138 186*     Lyme ab  screening  Recent Labs   Lab Test 11/10/19  1118   LYMEGM 0.05     Tuberculosis Screening  Recent Labs   Lab Test 09/25/19  2011   TBRES Negative     UA  Recent Labs   Lab Test 12/10/19  1545 11/10/19  1410 11/08/19  1148   COLOR Yellow Yellow Nadia   APPEARANCE Slightly Cloudy Clear Slightly Cloudy   URINEGLC Negative Negative Negative   URINEBILI Negative Negative Negative   SG 1.022 1.019 1.027   URINEPH 5.5 6.0 5.0   PROTEIN 30* 30* 30*   NITRITE Negative Negative Negative   UBLD Trace* Small* Negative   LEUKEST Negative Negative Negative   WBCU 1 2 3   RBCU <1 0 2   BACTERIA  --   --  Few*   MUCOUS Present* Present* Present*     Urine Microscopic  Recent Labs   Lab Test 12/10/19  1545 11/10/19  1410 11/08/19  1148   WBCU 1 2 3   RBCU <1 0 2   BACTERIA  --   --  Few*   MUCOUS Present* Present* Present*     Urine Protein  Recent Labs   Lab Test 12/10/19  1545 11/06/19  0730 08/22/19  0700 06/11/19  0700   UTP  --  0.08 0.07 0.14   UTPG  --  0.11 0.06 0.15   UCRR 145 75 119 93     Synovial Fluid Studies  Recent Labs   Lab Test 12/16/19  1235   FCOL Colorless   FAPR Slightly Cloudy   FWBC 64   FLYM 7   FMONO 89       Imaging:     I have reviewed all pertinent investigations including imaging, including outside records if relevant    PET scan (5/19/2020)                                                                   IMPRESSION: This patient with history of multiple myeloma status post  transplant,  1. New focus of hypermetabolism in the anterior aspect of the right  third rib in correspondence of the fracture.No associated soft tissue  or definite underlying lytic lesion.    2. Hypermetabolism along gastric pyloric canal, likely due to  gastritis. Please clinically correlate and if needed endoscopy can be  performed.    Assessment / Plan:    Angel Yanez is a 60 year old very pleasant male with pmhx MM s/p 2nd autologous BMT 5/2019, psoriasis, PsA, GERD, R LUMA, L hip OA, is referred to rheumatology clinic for  arthralgia. He reports being diagnosed with PsA in 2003 -reports presentation with finger and toe swelling. He was seen at [facility] at that time. He was started on methotrexate but had cytopenia, which was thought to be 2/2 methotrexate but he ended up being diagnosed with MM. He underwent BMT around 2005.  He established with Dr Angel Lo at [facility]. I do not have prior rheumatology records today. In 2010, he was restarted on methotrexate. Around 2017, he was found to have recurrence of MM and underwent BMT in 2019. Last time he was on methotrexate was in 2018. He does not remember for sure but the max dose he was on was 12.5 mg weekly. He varied between 10-12.5 mg weekly. Was never on biologic therapy or any other medication other than methotrexate. He has had ongoing cytopenia related MM and BMT, has upcoming appt for restaging next month. Overall, he does not have signs of active PsA. The only symptom he has been experiencing is R 3rd MCP stiffness/locking sensation when grabbing something -this has been improving with hand exercises. He does not have synovitis over the R 3rd MCP joint. He is not looking to resume any systemic treatment and I do not think it would be safe to start DMARD therapy with his ongoing cytopenia and upcoming restaging for MM. He is simply looking to establish with rheumatology at [place]. We discussed therapeutic options for his R 3rd MCP stiffness (could be tendon related/ early trigger finger) including trying topical diclofenac gel over the R 3rd MCP, hand therapy and corticosteroid injection. Currently he would like to continue with the hand exercises as they are effective for him. Should he have more active symptoms of PsA -apremilast could be a potential option given relatively low side effect profile; though caution would be needed given underlying depression. Would need discussion with BMT team prior to initiating any systemic treatment  for PsA. Will also obtain records from [facility]. He will inform me if any changes in symptoms and would like to leave follow-up as-needed for now.      Mr. Yanez verbalized agreement with and understanding of the rationale for the diagnosis and treatment plan.  All questions were answered to best of my ability and the patient's satisfaction. Mr. Yanez was advised to contact the clinic with any questions that may arise after the clinic visit.        Chart documentation done in part with Dragon Voice recognition Software. Although reviewed after completion, some word and grammatical error may remain.      RTC PRN    Nereida Pereira MD

## 2021-04-22 ENCOUNTER — OFFICE VISIT (OUTPATIENT)
Dept: RHEUMATOLOGY | Facility: CLINIC | Age: 61
End: 2021-04-22
Attending: PHYSICIAN ASSISTANT
Payer: COMMERCIAL

## 2021-04-22 VITALS
HEART RATE: 75 BPM | TEMPERATURE: 96.9 F | OXYGEN SATURATION: 100 % | BODY MASS INDEX: 25.65 KG/M2 | HEIGHT: 71 IN | DIASTOLIC BLOOD PRESSURE: 84 MMHG | WEIGHT: 183.2 LBS | SYSTOLIC BLOOD PRESSURE: 128 MMHG

## 2021-04-22 DIAGNOSIS — Z94.81 STATUS POST BONE MARROW TRANSPLANT (H): ICD-10-CM

## 2021-04-22 DIAGNOSIS — L40.50 PSORIATIC ARTHRITIS (H): ICD-10-CM

## 2021-04-22 PROCEDURE — 99203 OFFICE O/P NEW LOW 30 MIN: CPT | Performed by: STUDENT IN AN ORGANIZED HEALTH CARE EDUCATION/TRAINING PROGRAM

## 2021-04-22 ASSESSMENT — MIFFLIN-ST. JEOR: SCORE: 1653.12

## 2021-04-22 NOTE — PATIENT INSTRUCTIONS
-Please let me know if you have any worsening of symptoms  -Please keep me updated on your symptoms and we can arrange for a follow-up appointment when needed

## 2021-05-17 ENCOUNTER — APPOINTMENT (OUTPATIENT)
Dept: LAB | Facility: CLINIC | Age: 61
End: 2021-05-17
Payer: COMMERCIAL

## 2021-05-17 ENCOUNTER — OFFICE VISIT (OUTPATIENT)
Dept: TRANSPLANT | Facility: CLINIC | Age: 61
End: 2021-05-17
Attending: NURSE PRACTITIONER
Payer: COMMERCIAL

## 2021-05-17 ENCOUNTER — ANCILLARY PROCEDURE (OUTPATIENT)
Dept: BONE DENSITY | Facility: CLINIC | Age: 61
End: 2021-05-17
Attending: INTERNAL MEDICINE
Payer: COMMERCIAL

## 2021-05-17 VITALS
SYSTOLIC BLOOD PRESSURE: 136 MMHG | TEMPERATURE: 98 F | OXYGEN SATURATION: 99 % | WEIGHT: 181 LBS | DIASTOLIC BLOOD PRESSURE: 86 MMHG | RESPIRATION RATE: 18 BRPM | BODY MASS INDEX: 25.24 KG/M2 | HEART RATE: 72 BPM

## 2021-05-17 DIAGNOSIS — D80.1 HYPOGAMMAGLOBULINEMIA (H): ICD-10-CM

## 2021-05-17 DIAGNOSIS — D80.1 HYPOGAMMAGLOBULINEMIA (H): Primary | ICD-10-CM

## 2021-05-17 DIAGNOSIS — C90.00 MULTIPLE MYELOMA NOT HAVING ACHIEVED REMISSION (H): ICD-10-CM

## 2021-05-17 LAB
ALBUMIN SERPL-MCNC: 4.2 G/DL (ref 3.4–5)
ALP SERPL-CCNC: 77 U/L (ref 40–150)
ALT SERPL W P-5'-P-CCNC: 33 U/L (ref 0–70)
ANION GAP SERPL CALCULATED.3IONS-SCNC: 4 MMOL/L (ref 3–14)
AST SERPL W P-5'-P-CCNC: 23 U/L (ref 0–45)
BASOPHILS # BLD AUTO: 0 10E9/L (ref 0–0.2)
BASOPHILS NFR BLD AUTO: 0.6 %
BILIRUB SERPL-MCNC: 0.6 MG/DL (ref 0.2–1.3)
BUN SERPL-MCNC: 17 MG/DL (ref 7–30)
CALCIUM SERPL-MCNC: 9 MG/DL (ref 8.5–10.1)
CHLORIDE SERPL-SCNC: 110 MMOL/L (ref 94–109)
CO2 SERPL-SCNC: 26 MMOL/L (ref 20–32)
COLLECT DURATION TIME UR: 24 H
COLLECT DURATION TIME UR: 24 H
CREAT 24H UR-MRATE: 1.34 G/(24.H) (ref 1–2)
CREAT SERPL-MCNC: 0.92 MG/DL (ref 0.66–1.25)
CREAT UR-MCNC: 116 MG/DL
DIFFERENTIAL METHOD BLD: ABNORMAL
EOSINOPHIL # BLD AUTO: 0.2 10E9/L (ref 0–0.7)
EOSINOPHIL NFR BLD AUTO: 5.4 %
ERYTHROCYTE [DISTWIDTH] IN BLOOD BY AUTOMATED COUNT: 13 % (ref 10–15)
GFR SERPL CREATININE-BSD FRML MDRD: 89 ML/MIN/{1.73_M2}
GLUCOSE SERPL-MCNC: 72 MG/DL (ref 70–99)
HCT VFR BLD AUTO: 39.6 % (ref 40–53)
HGB BLD-MCNC: 12.9 G/DL (ref 13.3–17.7)
IMM GRANULOCYTES # BLD: 0 10E9/L (ref 0–0.4)
IMM GRANULOCYTES NFR BLD: 0 %
LDH SERPL L TO P-CCNC: 185 U/L (ref 85–227)
LYMPHOCYTES # BLD AUTO: 0.8 10E9/L (ref 0.8–5.3)
LYMPHOCYTES NFR BLD AUTO: 26.9 %
MAGNESIUM SERPL-MCNC: 2.4 MG/DL (ref 1.6–2.3)
MCH RBC QN AUTO: 35.1 PG (ref 26.5–33)
MCHC RBC AUTO-ENTMCNC: 32.6 G/DL (ref 31.5–36.5)
MCV RBC AUTO: 108 FL (ref 78–100)
MONOCYTES # BLD AUTO: 0.5 10E9/L (ref 0–1.3)
MONOCYTES NFR BLD AUTO: 17.3 %
NEUTROPHILS # BLD AUTO: 1.6 10E9/L (ref 1.6–8.3)
NEUTROPHILS NFR BLD AUTO: 49.8 %
NRBC # BLD AUTO: 0 10*3/UL
NRBC BLD AUTO-RTO: 0 /100
PHOSPHATE SERPL-MCNC: 2.3 MG/DL (ref 2.5–4.5)
PLATELET # BLD AUTO: 82 10E9/L (ref 150–450)
POTASSIUM SERPL-SCNC: 4.3 MMOL/L (ref 3.4–5.3)
PROT 24H UR-MRATE: 0.13 G/(24.H) (ref 0.04–0.23)
PROT SERPL-MCNC: 7.7 G/DL (ref 6.8–8.8)
PROT UR-MCNC: 0.12 G/L
PROT/CREAT 24H UR: 0.1 G/G CR (ref 0–0.2)
RBC # BLD AUTO: 3.67 10E12/L (ref 4.4–5.9)
SODIUM SERPL-SCNC: 140 MMOL/L (ref 133–144)
SPECIMEN VOL UR: 1158 ML
SPECIMEN VOL UR: 1158 ML
URATE SERPL-MCNC: 5.1 MG/DL (ref 3.5–7.2)
WBC # BLD AUTO: 3.1 10E9/L (ref 4–11)

## 2021-05-17 PROCEDURE — 88161 CYTOPATH SMEAR OTHER SOURCE: CPT | Mod: 26 | Performed by: PATHOLOGY

## 2021-05-17 PROCEDURE — 88237 TISSUE CULTURE BONE MARROW: CPT | Performed by: NURSE PRACTITIONER

## 2021-05-17 PROCEDURE — 88185 FLOWCYTOMETRY/TC ADD-ON: CPT | Performed by: NURSE PRACTITIONER

## 2021-05-17 PROCEDURE — 88311 DECALCIFY TISSUE: CPT | Mod: 26 | Performed by: PATHOLOGY

## 2021-05-17 PROCEDURE — 82784 ASSAY IGA/IGD/IGG/IGM EACH: CPT | Performed by: NURSE PRACTITIONER

## 2021-05-17 PROCEDURE — 84165 PROTEIN E-PHORESIS SERUM: CPT | Mod: TC | Performed by: NURSE PRACTITIONER

## 2021-05-17 PROCEDURE — 80053 COMPREHEN METABOLIC PANEL: CPT | Performed by: NURSE PRACTITIONER

## 2021-05-17 PROCEDURE — 85025 COMPLETE CBC W/AUTO DIFF WBC: CPT | Performed by: NURSE PRACTITIONER

## 2021-05-17 PROCEDURE — 999N001135 HC STATISTIC FLOW INT 2-8 ABY TC 88187: Performed by: NURSE PRACTITIONER

## 2021-05-17 PROCEDURE — 86335 IMMUNFIX E-PHORSIS/URINE/CSF: CPT | Mod: 26 | Performed by: PATHOLOGY

## 2021-05-17 PROCEDURE — 999N001086 HC STATISTIC MORPHOLOGY W/INTERP HEMEPATH TC 85060: Performed by: NURSE PRACTITIONER

## 2021-05-17 PROCEDURE — 88291 CYTO/MOLECULAR REPORT: CPT | Performed by: MEDICAL GENETICS

## 2021-05-17 PROCEDURE — 84156 ASSAY OF PROTEIN URINE: CPT | Performed by: NURSE PRACTITIONER

## 2021-05-17 PROCEDURE — 83883 ASSAY NEPHELOMETRY NOT SPEC: CPT | Performed by: NURSE PRACTITIONER

## 2021-05-17 PROCEDURE — 88280 CHROMOSOME KARYOTYPE STUDY: CPT | Performed by: NURSE PRACTITIONER

## 2021-05-17 PROCEDURE — 999N001126 HC STATISTIC BONE MARROW INTERP TC 85097: Performed by: NURSE PRACTITIONER

## 2021-05-17 PROCEDURE — 88271 CYTOGENETICS DNA PROBE: CPT | Performed by: NURSE PRACTITIONER

## 2021-05-17 PROCEDURE — 36416 COLLJ CAPILLARY BLOOD SPEC: CPT | Performed by: NURSE PRACTITIONER

## 2021-05-17 PROCEDURE — 85060 BLOOD SMEAR INTERPRETATION: CPT | Mod: 26 | Performed by: PATHOLOGY

## 2021-05-17 PROCEDURE — 82306 VITAMIN D 25 HYDROXY: CPT | Performed by: NURSE PRACTITIONER

## 2021-05-17 PROCEDURE — 88342 IMHCHEM/IMCYTCHM 1ST ANTB: CPT | Mod: TC | Performed by: NURSE PRACTITIONER

## 2021-05-17 PROCEDURE — 84550 ASSAY OF BLOOD/URIC ACID: CPT | Performed by: NURSE PRACTITIONER

## 2021-05-17 PROCEDURE — 86334 IMMUNOFIX E-PHORESIS SERUM: CPT | Mod: 26 | Performed by: STUDENT IN AN ORGANIZED HEALTH CARE EDUCATION/TRAINING PROGRAM

## 2021-05-17 PROCEDURE — 83735 ASSAY OF MAGNESIUM: CPT | Performed by: NURSE PRACTITIONER

## 2021-05-17 PROCEDURE — 84100 ASSAY OF PHOSPHORUS: CPT | Performed by: NURSE PRACTITIONER

## 2021-05-17 PROCEDURE — 88161 CYTOPATH SMEAR OTHER SOURCE: CPT | Mod: TC | Performed by: NURSE PRACTITIONER

## 2021-05-17 PROCEDURE — 38222 DX BONE MARROW BX & ASPIR: CPT

## 2021-05-17 PROCEDURE — 81050 URINALYSIS VOLUME MEASURE: CPT | Performed by: NURSE PRACTITIONER

## 2021-05-17 PROCEDURE — 88341 IMHCHEM/IMCYTCHM EA ADD ANTB: CPT | Mod: 26 | Performed by: PATHOLOGY

## 2021-05-17 PROCEDURE — 88264 CHROMOSOME ANALYSIS 20-25: CPT | Performed by: NURSE PRACTITIONER

## 2021-05-17 PROCEDURE — 88187 FLOWCYTOMETRY/READ 2-8: CPT | Performed by: PATHOLOGY

## 2021-05-17 PROCEDURE — 83615 LACTATE (LD) (LDH) ENZYME: CPT | Performed by: NURSE PRACTITIONER

## 2021-05-17 PROCEDURE — 88305 TISSUE EXAM BY PATHOLOGIST: CPT | Mod: 26 | Performed by: PATHOLOGY

## 2021-05-17 PROCEDURE — 86334 IMMUNOFIX E-PHORESIS SERUM: CPT | Mod: TC | Performed by: NURSE PRACTITIONER

## 2021-05-17 PROCEDURE — 88342 IMHCHEM/IMCYTCHM 1ST ANTB: CPT | Mod: 26 | Performed by: PATHOLOGY

## 2021-05-17 PROCEDURE — 86335 IMMUNFIX E-PHORSIS/URINE/CSF: CPT | Mod: TC | Performed by: NURSE PRACTITIONER

## 2021-05-17 PROCEDURE — 84166 PROTEIN E-PHORESIS/URINE/CSF: CPT | Mod: 26 | Performed by: PATHOLOGY

## 2021-05-17 PROCEDURE — 88184 FLOWCYTOMETRY/ TC 1 MARKER: CPT | Performed by: NURSE PRACTITIONER

## 2021-05-17 PROCEDURE — 999N001022 HC STATISTIC H-SPHEME PROCESS B/S: Performed by: NURSE PRACTITIONER

## 2021-05-17 PROCEDURE — 84165 PROTEIN E-PHORESIS SERUM: CPT | Mod: 26 | Performed by: STUDENT IN AN ORGANIZED HEALTH CARE EDUCATION/TRAINING PROGRAM

## 2021-05-17 PROCEDURE — 999N001068 HC STATISTIC BONE MARROW CORE PERF TC 38221: Performed by: NURSE PRACTITIONER

## 2021-05-17 PROCEDURE — 77080 DXA BONE DENSITY AXIAL: CPT | Performed by: INTERNAL MEDICINE

## 2021-05-17 PROCEDURE — 84166 PROTEIN E-PHORESIS/URINE/CSF: CPT | Mod: TC | Performed by: NURSE PRACTITIONER

## 2021-05-17 PROCEDURE — 88311 DECALCIFY TISSUE: CPT | Mod: TC | Performed by: NURSE PRACTITIONER

## 2021-05-17 PROCEDURE — 85097 BONE MARROW INTERPRETATION: CPT | Mod: 26 | Performed by: PATHOLOGY

## 2021-05-17 PROCEDURE — 999N001036 HC STATISTIC TOTAL PROTEIN: Performed by: NURSE PRACTITIONER

## 2021-05-17 PROCEDURE — 88275 CYTOGENETICS 100-300: CPT | Performed by: NURSE PRACTITIONER

## 2021-05-17 PROCEDURE — 999N001102 HC STATISTIC DNA PROCESS AND HOLD: Performed by: NURSE PRACTITIONER

## 2021-05-17 PROCEDURE — 88341 IMHCHEM/IMCYTCHM EA ADD ANTB: CPT | Mod: TC | Performed by: NURSE PRACTITIONER

## 2021-05-17 PROCEDURE — 88305 TISSUE EXAM BY PATHOLOGIST: CPT | Mod: TC | Performed by: NURSE PRACTITIONER

## 2021-05-17 ASSESSMENT — PAIN SCALES - GENERAL: PAINLEVEL: NO PAIN (0)

## 2021-05-17 NOTE — PROGRESS NOTES
BMT ONC Adult Bone Marrow Biopsy Procedure Note  May 17, 2021  /86 (BP Location: Right arm, Patient Position: Sitting, Cuff Size: Adult Regular)   Pulse 72   Temp 98  F (36.7  C) (Oral)   Resp 18   Wt 82.1 kg (181 lb)   SpO2 99%   BMI 25.24 kg/m       Learning needs assessment complete within 12 months? YES    DIAGNOSIS: MM     PROCEDURE: Unilateral Bone Marrow Biopsy and Unilateral Aspirate    LOCATION: [facility] 2nd [facility]    Patient s identification was positively verified by verbal identification and invasive procedure safety checklist was completed. Informed consent was obtained. The patient was placed in the prone position and prepped and draped in a sterile manner. Approximately 10 cc of 1% Lidocaine was used over the left posterior iliac spine. Following this a 3 mm incision was made. Trephine bone marrow core(s) was (were) obtained from the [facility]. Bone marrow aspirates were obtained from the [facility]. Aspirates were sent for morphology, immunophenotyping, cytogenetics and molecular diagnostics . A total of approximately 20 ml of marrow was aspirated. Following this procedure a sterile dressing was applied to the bone marrow biopsy site(s). The patient was placed in the supine position to maintain pressure on the biopsy site. Post-procedure wound care instructions were given.     Complications: NO    Pre-procedural pain: 0 out of 10 on the numeric pain rating scale.     Procedural pain: 4 out of 10 on the numeric pain rating scale.     Post-procedural pain assessment: 0 out of 10 on the numeric pain rating scale.     Interventions: NO    Length of procedure:20 minutes or less      Procedure performed by: Karla Manzo

## 2021-05-17 NOTE — NURSING NOTE
"    May 17, 2021 9:37 AM   Angel Yanez is a 60 year old male who presents for:    Chief Complaint   Patient presents with     Bone Marrow Biopsy     Pt here for BMBX r/t MM. Labs drawn via  by RN in clinic. VS taken.      Initial Vitals: /86 (BP Location: Right arm, Patient Position: Sitting, Cuff Size: Adult Regular)   Pulse 72   Temp 98  F (36.7  C) (Oral)   Resp 18   Wt 82.1 kg (181 lb)   SpO2 99%   BMI 25.24 kg/m   Estimated body mass index is 25.24 kg/m  as calculated from the following:    Height as of 4/22/21: 1.803 m (5' 11\").    Weight as of this encounter: 82.1 kg (181 lb). Body surface area is 2.03 meters squared.  No Pain (0) Comment: Data Unavailable   No LMP for male patient.  Allergies reviewed: Yes  Medications reviewed: Yes    Medications: Medication refills not needed today.  Pharmacy name entered into EPIC:    [facility] - [place], [place] - [address] PHARMACY [facility] - [place], [place] - [address] [facility]    Clinical concerns: None       María Vidal RN          BMT Teaching Flowsheet   Teaching Topic: post biopsy instructions    Person(s) involved in teaching: Patient  Motivation Level  Asks Questions: Yes  Eager to Learn: Yes  Cooperative: Yes  Receptive (willing/able to accept information): Yes    Patient demonstrates understanding of the following:   - Reason for the appointment, diagnosis and treatment plan: Yes  - Knowledge of proper use of medications and conditions for which they are ordered (with special attention to potential side effects or drug interactions): Yes  - Which situations necessitate calling provider and whom to contact: Yes    Teaching concerns addressed: reviewed activity restrictions if received premeds, potential for bleeding and actions to take if develops any of the issues below    Proper use and care of (medical equipment, care aids, etc.) Yes  Pain management techniques: Yes  Patient instructed on hand hygiene: " Yes  How and/when to access community resources: Yes    Infection Control:  Patient demonstrates understanding of the following:   Surgical procedure site care taught: Yes  Signs and symptoms of infection taught: Yes  Wound care taught: Yes      Instructional Materials Used/Given: verbal, print out of post biopsy instructions.     María Vidal RN

## 2021-05-17 NOTE — LETTER
5/17/2021         RE: Angel Yanez  [address]  [place] [address]        Dear Colleague,    Thank you for referring your patient, Angel Yanez, to the [facility] BLOOD AND MARROW TRANSPLANT PROGRAM [place]. Please see a copy of my visit note below.    BMT ONC Adult Bone Marrow Biopsy Procedure Note  May 17, 2021  /86 (BP Location: Right arm, Patient Position: Sitting, Cuff Size: Adult Regular)   Pulse 72   Temp 98  F (36.7  C) (Oral)   Resp 18   Wt 82.1 kg (181 lb)   SpO2 99%   BMI 25.24 kg/m       Learning needs assessment complete within 12 months? YES    DIAGNOSIS: MM     PROCEDURE: Unilateral Bone Marrow Biopsy and Unilateral Aspirate    [place] [facility]    Patient s identification was positively verified by verbal identification and invasive procedure safety checklist was completed. Informed consent was obtained. The patient was placed in the prone position and prepped and draped in a sterile manner. Approximately 10 cc of 1% Lidocaine was used over the left posterior iliac spine. Following this a 3 mm incision was made. Trephine bone marrow core(s) was (were) obtained from the [facility]. Bone marrow aspirates were obtained from the [facility]. Aspirates were sent for morphology, immunophenotyping, cytogenetics and molecular diagnostics . A total of approximately 20 ml of marrow was aspirated. Following this procedure a sterile dressing was applied to the bone marrow biopsy site(s). The patient was placed in the supine position to maintain pressure on the biopsy site. Post-procedure wound care instructions were given.     Complications: NO    Pre-procedural pain: 0 out of 10 on the numeric pain rating scale.     Procedural pain: 4 out of 10 on the numeric pain rating scale.     Post-procedural pain assessment: 0 out of 10 on the numeric pain rating scale.     Interventions: NO    Length of procedure:20 minutes or less      Procedure performed by: Karla  Jovany        Again, thank you for allowing me to participate in the care of your patient.        Sincerely,        [facility]ONE [facility]ARROW [facility]

## 2021-05-18 LAB
ALBUMIN SERPL ELPH-MCNC: 4.8 G/DL (ref 3.7–5.1)
ALPHA1 GLOB SERPL ELPH-MCNC: 0.3 G/DL (ref 0.2–0.4)
ALPHA2 GLOB SERPL ELPH-MCNC: 0.7 G/DL (ref 0.5–0.9)
B-GLOBULIN SERPL ELPH-MCNC: 0.6 G/DL (ref 0.6–1)
COPATH REPORT: NORMAL
COPATH REPORT: NORMAL
GAMMA GLOB SERPL ELPH-MCNC: 1 G/DL (ref 0.7–1.6)
IGA SERPL-MCNC: 63 MG/DL (ref 84–499)
IGG SERPL-MCNC: 1089 MG/DL (ref 610–1616)
IGM SERPL-MCNC: 166 MG/DL (ref 35–242)
KAPPA LC UR-MCNC: 1.09 MG/DL (ref 0.33–1.94)
KAPPA LC/LAMBDA SER: 0.58 {RATIO} (ref 0.26–1.65)
LAMBDA LC SERPL-MCNC: 1.89 MG/DL (ref 0.57–2.63)
M PROTEIN SERPL ELPH-MCNC: 0.2 G/DL
PROT PATTERN SERPL ELPH-IMP: ABNORMAL
PROT PATTERN SERPL IFE-IMP: NORMAL

## 2021-05-19 LAB
COPATH REPORT: NORMAL
DEPRECATED CALCIDIOL+CALCIFEROL SERPL-MC: <50 UG/L (ref 20–75)
PROT ELPH PNL UR ELPH: NORMAL
PROT PATTERN UR ELPH-IMP: NORMAL
VITAMIN D2 SERPL-MCNC: <5 UG/L
VITAMIN D3 SERPL-MCNC: 45 UG/L

## 2021-05-20 ENCOUNTER — OFFICE VISIT (OUTPATIENT)
Dept: TRANSPLANT | Facility: CLINIC | Age: 61
End: 2021-05-20
Attending: INTERNAL MEDICINE
Payer: COMMERCIAL

## 2021-05-20 VITALS
RESPIRATION RATE: 16 BRPM | OXYGEN SATURATION: 98 % | WEIGHT: 186 LBS | SYSTOLIC BLOOD PRESSURE: 156 MMHG | HEART RATE: 67 BPM | HEIGHT: 71 IN | DIASTOLIC BLOOD PRESSURE: 90 MMHG | BODY MASS INDEX: 26.04 KG/M2 | TEMPERATURE: 97.7 F

## 2021-05-20 DIAGNOSIS — Z94.81 STATUS POST BONE MARROW TRANSPLANT (H): Primary | ICD-10-CM

## 2021-05-20 DIAGNOSIS — C90.01 MULTIPLE MYELOMA IN REMISSION (H): ICD-10-CM

## 2021-05-20 PROCEDURE — G0463 HOSPITAL OUTPT CLINIC VISIT: HCPCS

## 2021-05-20 PROCEDURE — 99214 OFFICE O/P EST MOD 30 MIN: CPT | Performed by: INTERNAL MEDICINE

## 2021-05-20 ASSESSMENT — PAIN SCALES - GENERAL: PAINLEVEL: NO PAIN (0)

## 2021-05-20 ASSESSMENT — MIFFLIN-ST. JEOR: SCORE: 1665.82

## 2021-05-20 NOTE — NURSING NOTE
"Oncology Rooming Note    May 20, 2021 2:37 PM   Angel Yanez is a 60 year old male who presents for:    Chief Complaint   Patient presents with     Oncology Clinic Visit     hypogammaglobulinemia      Initial Vitals: BP (!) 156/90   Pulse 67   Temp 97.7  F (36.5  C) (Oral)   Resp 16   Ht 1.803 m (5' 11\")   Wt 84.4 kg (186 lb)   SpO2 98%   BMI 25.94 kg/m   Estimated body mass index is 25.94 kg/m  as calculated from the following:    Height as of this encounter: 1.803 m (5' 11\").    Weight as of this encounter: 84.4 kg (186 lb). Body surface area is 2.06 meters squared.  No Pain (0) Comment: Data Unavailable   No LMP for male patient.  Allergies reviewed: Yes  Medications reviewed: Yes    Medications: Medication refills not needed today.  Pharmacy name entered into EPIC:    [facility] - [place], [place] - [address] [facility] - [place], [place] - [address] [facility]    Clinical concerns: No new concerns today  Dr Lucio was NOT notified.         Luna Mitchell            "

## 2021-05-20 NOTE — PROGRESS NOTES
"BP (!) 156/90   Pulse 67   Temp 97.7  F (36.5  C) (Oral)   Resp 16   Ht 1.803 m (5' 11\")   Wt 84.4 kg (186 lb)   SpO2 98%   BMI 25.94 kg/m    Wt Readings from Last 4 Encounters:   05/20/21 84.4 kg (186 lb)   05/17/21 82.1 kg (181 lb)   04/22/21 83.1 kg (183 lb 3.2 oz)   02/25/21 84.5 kg (186 lb 4.8 oz)     Guille returns 2 years after his autotransplant for myeloma.  It is notable that though he remained severely thrombocytopenic for 10 months after his transplant and then romiplostim was started and his counts came up in 2 months and have stayed in a safe range since then.  In the last year he has had very little trouble with fever chills rash bleeding or bruising.  He is needed no transfusions.    He had some dental visits and amoxicillin gave him a rash, clindamycin gave him significant GI trouble but he tolerated cephalexin without problem which should be his predental antibiotic.    He is eating well and has no continuing GI  respiratory cardiac or ENT symptoms.  His psoriasis is beginning to flare a bit with some swollen knuckles on his right hand and small skin patches but nothing extensive.  He has chronic soreness and stiffness in his left hip but it is not limiting and the rest of his review of systems is unrevealing.    His exam shows his weight stable in the last few months and though his blood pressure was elevated on arrival is not regularly high.    He has no cervical supraclavicular or inguinal lymphadenopathy.  His oropharynx is clear without mucosal changes.  He has no focal bone tenderness at any site.  His lungs are clear; his heart tones are regular without a gallop.  His abdomen is soft and nontender.  He has no peripheral edema.  His only occasional scattered patches of scaly psoriatic-like skin lesions.    Laboratory testing showed persisting thrombocytopenia but higher than its been in the last 6 months.  There is no biochemical evidence of recurrent myeloma and his bone marrow, 10% " cellular, shows no myeloma morphologically or by flow cytometry.  No imaging was done.  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    He has low bone density on the DEXA scan, particularly in his left hip (T score -2.3).    He continues to take calcium plus vitamin D and I told him he could get over-the-counter combination medicines including both in 1 pill.  He continues losartan and paroxetine as well.    Overall he is doing well and needs no active other interventions.  If his arthritis becomes worse or his psoriasis flares it is important to avoid any myelosuppressive medication.  His marrow reserve is limited.    He will return for follow-up in 6 months time but he knows to call if additional problems arise    Adebayo Lucio MD    Professor of medicine    Results for SAMEER KNIGHT (MRN [id number]) as of 5/21/2021 15:57   Ref. Range 5/17/2021 08:00 5/17/2021 09:29 5/17/2021 09:31 5/17/2021 09:47 5/17/2021 09:50   Sodium Latest Ref Range: 133 - 144 mmol/L  140      Potassium Latest Ref Range: 3.4 - 5.3 mmol/L  4.3      Chloride Latest Ref Range: 94 - 109 mmol/L  110 (H)      Carbon Dioxide Latest Ref Range: 20 - 32 mmol/L  26      Urea Nitrogen Latest Ref Range: 7 - 30 mg/dL  17      Creatinine Latest Ref Range: 0.66 - 1.25 mg/dL  0.92      GFR Estimate Latest Ref Range: >60 mL/min/1.73_m2  89      GFR Estimate If Black Latest Ref Range: >60 mL/min/1.73_m2  >90      Calcium Latest Ref Range: 8.5 - 10.1 mg/dL  9.0      Anion Gap Latest Ref Range: 3 - 14 mmol/L  4      Magnesium Latest Ref Range: 1.6 - 2.3 mg/dL  2.4 (H)      Phosphorus Latest Ref Range: 2.5 - 4.5 mg/dL  2.3 (L)      Albumin Latest Ref Range: 3.4 - 5.0 g/dL  4.2      Protein Total Latest Ref Range: 6.8 - 8.8 g/dL  7.7      Bilirubin Total Latest Ref Range: 0.2 - 1.3 mg/dL  0.6      Alkaline Phosphatase Latest Ref Range: 40 - 150 U/L  77      ALT Latest Ref Range: 0 - 70  U/L  33      AST Latest Ref Range: 0 - 45 U/L  23      25 OH Vit D total Latest Ref Range: 20 - 75 ug/L  <50      25 OH Vit D2 Latest Units: ug/L  <5      25 OH Vit D3 Latest Units: ug/L  45      Creatinine Urine Timed Latest Ref Range: 1.00 - 2.00  1.34       Creatinine Urine Latest Units: mg/dL 116       Lactate Dehydrogenase Latest Ref Range: 85 - 227 U/L  185      Protein Random Urine Latest Units: g/L 0.12       Protein Total Urine g/gr Creatinine Latest Ref Range: 0 - 0.2 g/g Cr 0.10       Protein Total 24 Hr Urine Latest Ref Range: 0.04 - 0.23  0.13       Uric Acid Latest Ref Range: 3.5 - 7.2 mg/dL  5.1      URINALYSIS COLLECTION VOLUME & DURATION Unknown Rpt       Glucose Latest Ref Range: 70 - 99 mg/dL  72      WBC Latest Ref Range: 4.0 - 11.0 10e9/L  3.1 (L)      Hemoglobin Latest Ref Range: 13.3 - 17.7 g/dL  12.9 (L)      Hematocrit Latest Ref Range: 40.0 - 53.0 %  39.6 (L)      Platelet Count Latest Ref Range: 150 - 450 10e9/L  82 (L)      RBC Count Latest Ref Range: 4.4 - 5.9 10e12/L  3.67 (L)      MCV Latest Ref Range: 78 - 100 fl  108 (H)      MCH Latest Ref Range: 26.5 - 33.0 pg  35.1 (H)      MCHC Latest Ref Range: 31.5 - 36.5 g/dL  32.6      RDW Latest Ref Range: 10.0 - 15.0 %  13.0      Diff Method Unknown  Automated Method      % Neutrophils Latest Units: %  49.8      % Lymphocytes Latest Units: %  26.9      % Monocytes Latest Units: %  17.3      % Eosinophils Latest Units: %  5.4      % Basophils Latest Units: %  0.6      % Immature Granulocytes Latest Units: %  0.0      Nucleated RBCs Latest Ref Range: 0 /100  0      Absolute Neutrophil Latest Ref Range: 1.6 - 8.3 10e9/L  1.6      Absolute Lymphocytes Latest Ref Range: 0.8 - 5.3 10e9/L  0.8      Absolute Monocytes Latest Ref Range: 0.0 - 1.3 10e9/L  0.5      Absolute Eosinophils Latest Ref Range: 0.0 - 0.7 10e9/L  0.2      Absolute Basophils Latest Ref Range: 0.0 - 0.2 10e9/L  0.0      Abs Immature Granulocytes Latest Ref Range: 0 - 0.4 10e9/L   0.0      Absolute Nucleated RBC Unknown  0.0      Albumin Fraction Latest Ref Range: 3.7 - 5.1 g/dL  4.8      Alpha 1 Fraction Latest Ref Range: 0.2 - 0.4 g/dL  0.3      Alpha 2 Fraction Latest Ref Range: 0.5 - 0.9 g/dL  0.7      Beta Fraction Latest Ref Range: 0.6 - 1.0 g/dL  0.6      CHROMOSOME BONE MARROW Unknown     Rpt   ELP Interpretation: Unknown  Two small monoclo...      ELP Interpretation Urine Unknown Albumin and globu...       FISH Unknown     Rpt   Gamma Fraction Latest Ref Range: 0.7 - 1.6 g/dL  1.0      IGA Latest Ref Range: 84 - 499 mg/dL  63 (L)      IGG Latest Ref Range: 610 - 1,616 mg/dL  1,089      IGM Latest Ref Range: 35 - 242 mg/dL  166      Immunofix ELP Urine Unknown No monoclonal pro...       Immunofixation ELP Unknown  (Note)      Kappa Free Lt Chain Latest Ref Range: 0.33 - 1.94 mg/dL  1.09      Kappa Lambda Ratio Latest Ref Range: 0.26 - 1.65   0.58      Lambda Free Lt Chain Latest Ref Range: 0.57 - 2.63 mg/dL  1.89      LEUKEMIA LYMPHOMA EVALUATION (FLOW CYTOMETRY) Unknown     Rpt   Monoclonal Peak Latest Ref Range: 0.0 g/dL  0.2 (H)      PROCESS AND HOLD DNA Unknown     Rpt   BONE MARROW BIOPSY Unknown    Rpt    DX HIP/PELVIS/SPINE Unknown   Rpt       05/17/21 0929    Result status: Final   Resulting lab: [facility]   Value: (Note)   Comment: Two monoclonal IgM immunoglobulins of lambda light chain type, one is   very small. Pathological significance requires clinical correlation.      Patient Name: SHIMON KNIGHT   MR#: [id number]   Specimen #: [id number]   Collected: 5/17/2021   Received: 5/17/2021   Reported: 5/18/2021 16:57   Ordering Phy(s): JOLENE HOLT     For improved result formatting, select 'View Enhanced Report Format' under    Linked Documents section.     TEST(S):   Unilateral Bone Marrow Biopsy/Aspiration     FINAL DIAGNOSIS:   Bone marrow, posterior iliac crest, left decalcified trephine biopsy and   touch imprint; left  particle crush,   direct aspirate smear, and concentrated aspirate smear; and peripheral   blood smear:     - No morphologic or immunophenotypic evidence of plasma cell neoplasm   (see comment)     - Hypocellular bone marrow (10%) in a suboptimal biopsy with rare   polytypic plasma cells     - Peripheral blood showing slight normochromic, macrocytic anemia;   slight leukopenia; moderate   thrombocytopenia     COMMENT:   Concurrent flow cytometry ([id number] 4752) shows polytypic plasma cells.   ----  Patient name:                          Angel Yanez  Patient demographics:            60 year old White Male   History:                                    Evaluation of Bone Density  Current treatments:                 Calcium, Vitamin D  Scan:                                       [employer]        Conclusions:  The most negative and valid T-score of -2.3 at the level of the left femoral neck corresponds with low bone density according to WHO criteria for postmenopausal females and men age 50 and over. The risk of osteoporotic fracture increases approximately 2-fold for each 1.0 SD decrease in T-score.     Low bone density is not the only risk factor for fracture; consider factors such as patient's age, fall risk, injury risk, previous osteoporotic fracture, family history of osteoporosis, etc.  People with an elevated risk of fracture should be regularly evaluated for low bone mineral density.  For patients eligible for Medicare, routine testing is allowed once every 2 years. Testing frequency can be increased for patients on corticosteroids. Clinical correlation is recommended.

## 2021-05-20 NOTE — LETTER
"    5/20/2021         RE: Angel Yanez  [address]  [facility] [address]        Dear Colleague,    Thank you for referring your patient, Angel Yanez, to the [facility]LO[facility] MARROW [facility]ANSPL[facility] [place]. Please see a copy of my visit note below.    BP (!) 156/90   Pulse 67   Temp 97.7  F (36.5  C) (Oral)   Resp 16   Ht 1.803 m (5' 11\")   Wt 84.4 kg (186 lb)   SpO2 98%   BMI 25.94 kg/m    Wt Readings from Last 4 Encounters:   05/20/21 84.4 kg (186 lb)   05/17/21 82.1 kg (181 lb)   04/22/21 83.1 kg (183 lb 3.2 oz)   02/25/21 84.5 kg (186 lb 4.8 oz)     Guille returns 2 years after his autotransplant for myeloma.  It is notable that though he remained severely thrombocytopenic for 10 months after his transplant and then romiplostim was started and his counts came up in 2 months and have stayed in a safe range since then.  In the last year he has had very little trouble with fever chills rash bleeding or bruising.  He is needed no transfusions.    He had some dental visits and amoxicillin gave him a rash, clindamycin gave him significant GI trouble but he tolerated cephalexin without problem which should be his predental antibiotic.    He is eating well and has no continuing GI  respiratory cardiac or ENT symptoms.  His psoriasis is beginning to flare a bit with some swollen knuckles on his right hand and small skin patches but nothing extensive.  He has chronic soreness and stiffness in his left hip but it is not limiting and the rest of his review of systems is unrevealing.    His exam shows his weight stable in the last few months and though his blood pressure was elevated on arrival is not regularly high.    He has no cervical supraclavicular or inguinal lymphadenopathy.  His oropharynx is clear without mucosal changes.  He has no focal bone tenderness at any site.  His lungs are clear; his heart tones are regular without a gallop.  His abdomen is soft and nontender.  He " has no peripheral edema.  His only occasional scattered patches of scaly psoriatic-like skin lesions.    Laboratory testing showed persisting thrombocytopenia but higher than its been in the last 6 months.  There is no biochemical evidence of recurrent myeloma and his bone marrow, 10% cellular, shows no myeloma morphologically or by flow cytometry.  No imaging was done.  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    He has low bone density on the DEXA scan, particularly in his left hip (T score -2.3).    He continues to take calcium plus vitamin D and I told him he could get over-the-counter combination medicines including both in 1 pill.  He continues losartan and paroxetine as well.    Overall he is doing well and needs no active other interventions.  If his arthritis becomes worse or his psoriasis flares it is important to avoid any myelosuppressive medication.  His marrow reserve is limited.    He will return for follow-up in 6 months time but he knows to call if additional problems arise    Adebayo Lucio MD    Professor of medicine    Results for ANTONIASAMEER (MRN [id number]) as of 5/21/2021 15:57   Ref. Range 5/17/2021 08:00 5/17/2021 09:29 5/17/2021 09:31 5/17/2021 09:47 5/17/2021 09:50   Sodium Latest Ref Range: 133 - 144 mmol/L  140      Potassium Latest Ref Range: 3.4 - 5.3 mmol/L  4.3      Chloride Latest Ref Range: 94 - 109 mmol/L  110 (H)      Carbon Dioxide Latest Ref Range: 20 - 32 mmol/L  26      Urea Nitrogen Latest Ref Range: 7 - 30 mg/dL  17      Creatinine Latest Ref Range: 0.66 - 1.25 mg/dL  0.92      GFR Estimate Latest Ref Range: >60 mL/min/1.73_m2  89      GFR Estimate If Black Latest Ref Range: >60 mL/min/1.73_m2  >90      Calcium Latest Ref Range: 8.5 - 10.1 mg/dL  9.0      Anion Gap Latest Ref Range: 3 - 14 mmol/L  4      Magnesium Latest Ref Range: 1.6 - 2.3 mg/dL  2.4 (H)      Phosphorus Latest Ref Range: 2.5  - 4.5 mg/dL  2.3 (L)      Albumin Latest Ref Range: 3.4 - 5.0 g/dL  4.2      Protein Total Latest Ref Range: 6.8 - 8.8 g/dL  7.7      Bilirubin Total Latest Ref Range: 0.2 - 1.3 mg/dL  0.6      Alkaline Phosphatase Latest Ref Range: 40 - 150 U/L  77      ALT Latest Ref Range: 0 - 70 U/L  33      AST Latest Ref Range: 0 - 45 U/L  23      25 OH Vit D total Latest Ref Range: 20 - 75 ug/L  <50      25 OH Vit D2 Latest Units: ug/L  <5      25 OH Vit D3 Latest Units: ug/L  45      Creatinine Urine Timed Latest Ref Range: 1.00 - 2.00  1.34       Creatinine Urine Latest Units: mg/dL 116       Lactate Dehydrogenase Latest Ref Range: 85 - 227 U/L  185      Protein Random Urine Latest Units: g/L 0.12       Protein Total Urine g/gr Creatinine Latest Ref Range: 0 - 0.2 g/g Cr 0.10       Protein Total 24 Hr Urine Latest Ref Range: 0.04 - 0.23  0.13       Uric Acid Latest Ref Range: 3.5 - 7.2 mg/dL  5.1      URINALYSIS COLLECTION VOLUME & DURATION Unknown Rpt       Glucose Latest Ref Range: 70 - 99 mg/dL  72      WBC Latest Ref Range: 4.0 - 11.0 10e9/L  3.1 (L)      Hemoglobin Latest Ref Range: 13.3 - 17.7 g/dL  12.9 (L)      Hematocrit Latest Ref Range: 40.0 - 53.0 %  39.6 (L)      Platelet Count Latest Ref Range: 150 - 450 10e9/L  82 (L)      RBC Count Latest Ref Range: 4.4 - 5.9 10e12/L  3.67 (L)      MCV Latest Ref Range: 78 - 100 fl  108 (H)      MCH Latest Ref Range: 26.5 - 33.0 pg  35.1 (H)      MCHC Latest Ref Range: 31.5 - 36.5 g/dL  32.6      RDW Latest Ref Range: 10.0 - 15.0 %  13.0      Diff Method Unknown  Automated Method      % Neutrophils Latest Units: %  49.8      % Lymphocytes Latest Units: %  26.9      % Monocytes Latest Units: %  17.3      % Eosinophils Latest Units: %  5.4      % Basophils Latest Units: %  0.6      % Immature Granulocytes Latest Units: %  0.0      Nucleated RBCs Latest Ref Range: 0 /100  0      Absolute Neutrophil Latest Ref Range: 1.6 - 8.3 10e9/L  1.6      Absolute Lymphocytes Latest Ref  Range: 0.8 - 5.3 10e9/L  0.8      Absolute Monocytes Latest Ref Range: 0.0 - 1.3 10e9/L  0.5      Absolute Eosinophils Latest Ref Range: 0.0 - 0.7 10e9/L  0.2      Absolute Basophils Latest Ref Range: 0.0 - 0.2 10e9/L  0.0      Abs Immature Granulocytes Latest Ref Range: 0 - 0.4 10e9/L  0.0      Absolute Nucleated RBC Unknown  0.0      Albumin Fraction Latest Ref Range: 3.7 - 5.1 g/dL  4.8      Alpha 1 Fraction Latest Ref Range: 0.2 - 0.4 g/dL  0.3      Alpha 2 Fraction Latest Ref Range: 0.5 - 0.9 g/dL  0.7      Beta Fraction Latest Ref Range: 0.6 - 1.0 g/dL  0.6      CHROMOSOME BONE MARROW Unknown     Rpt   ELP Interpretation: Unknown  Two small monoclo...      ELP Interpretation Urine Unknown Albumin and globu...       FISH Unknown     Rpt   Gamma Fraction Latest Ref Range: 0.7 - 1.6 g/dL  1.0      IGA Latest Ref Range: 84 - 499 mg/dL  63 (L)      IGG Latest Ref Range: 610 - 1,616 mg/dL  1,089      IGM Latest Ref Range: 35 - 242 mg/dL  166      Immunofix ELP Urine Unknown No monoclonal pro...       Immunofixation ELP Unknown  (Note)      Kappa Free Lt Chain Latest Ref Range: 0.33 - 1.94 mg/dL  1.09      Kappa Lambda Ratio Latest Ref Range: 0.26 - 1.65   0.58      Lambda Free Lt Chain Latest Ref Range: 0.57 - 2.63 mg/dL  1.89      LEUKEMIA LYMPHOMA EVALUATION (FLOW CYTOMETRY) Unknown     Rpt   Monoclonal Peak Latest Ref Range: 0.0 g/dL  0.2 (H)      PROCESS AND HOLD DNA Unknown     Rpt   BONE MARROW BIOPSY Unknown    Rpt    DX HIP/PELVIS/SPINE Unknown   Rpt       05/17/21 0929    Result status: Final   Resulting lab: [facility]   Value: (Note)   Comment: Two monoclonal IgM immunoglobulins of lambda light chain type, one is   very small. Pathological significance requires clinical correlation.      Patient Name: SHIMON KNIGHT   MR#: [id number]   Specimen #: [id number]   Collected: 5/17/2021   Received: 5/17/2021   Reported: 5/18/2021 16:57   Ordering Phy(s): JOLENE CHIU  JONATHON     For improved result formatting, select 'View Enhanced Report Format' under    Linked Documents section.     TEST(S):   Unilateral Bone Marrow Biopsy/Aspiration     FINAL DIAGNOSIS:   Bone marrow, posterior iliac crest, left decalcified trephine biopsy and   touch imprint; left particle crush,   direct aspirate smear, and concentrated aspirate smear; and peripheral   blood smear:     - No morphologic or immunophenotypic evidence of plasma cell neoplasm   (see comment)     - Hypocellular bone marrow (10%) in a suboptimal biopsy with rare   polytypic plasma cells     - Peripheral blood showing slight normochromic, macrocytic anemia;   slight leukopenia; moderate   thrombocytopenia     COMMENT:   Concurrent flow cytometry ([id number]) shows polytypic plasma cells.   ----  Patient name:                          Angel Yanez  Patient demographics:            60 year old White Male   History:                                    Evaluation of Bone Density  Current treatments:                 Calcium, Vitamin D  Scan:                                       [employer]        Conclusions:  The most negative and valid T-score of -2.3 at the level of the left femoral neck corresponds with low bone density according to WHO criteria for postmenopausal females and men age 50 and over. The risk of osteoporotic fracture increases approximately 2-fold for each 1.0 SD decrease in T-score.     Low bone density is not the only risk factor for fracture; consider factors such as patient's age, fall risk, injury risk, previous osteoporotic fracture, family history of osteoporosis, etc.  People with an elevated risk of fracture should be regularly evaluated for low bone mineral density.  For patients eligible for Medicare, routine testing is allowed once every 2 years. Testing frequency can be increased for patients on corticosteroids. Clinical correlation is recommended.         Again, thank you for allowing  me to participate in the care of your patient.        Sincerely,        Adebayo Lucio MD

## 2021-05-20 NOTE — LETTER
"    5/20/2021         RE: Angel Yanez  [address]  [place] [address]      BP (!) 156/90   Pulse 67   Temp 97.7  F (36.5  C) (Oral)   Resp 16   Ht 1.803 m (5' 11\")   Wt 84.4 kg (186 lb)   SpO2 98%   BMI 25.94 kg/m    Wt Readings from Last 4 Encounters:   05/20/21 84.4 kg (186 lb)   05/17/21 82.1 kg (181 lb)   04/22/21 83.1 kg (183 lb 3.2 oz)   02/25/21 84.5 kg (186 lb 4.8 oz)     Guille returns 2 years after his autotransplant for myeloma.  It is notable that though he remained severely thrombocytopenic for 10 months after his transplant and then romiplostim was started and his counts came up in 2 months and have stayed in a safe range since then.  In the last year he has had very little trouble with fever chills rash bleeding or bruising.  He is needed no transfusions.    He had some dental visits and amoxicillin gave him a rash, clindamycin gave him significant GI trouble but he tolerated cephalexin without problem which should be his predental antibiotic.    He is eating well and has no continuing GI  respiratory cardiac or ENT symptoms.  His psoriasis is beginning to flare a bit with some swollen knuckles on his right hand and small skin patches but nothing extensive.  He has chronic soreness and stiffness in his left hip but it is not limiting and the rest of his review of systems is unrevealing.    His exam shows his weight stable in the last few months and though his blood pressure was elevated on arrival is not regularly high.    He has no cervical supraclavicular or inguinal lymphadenopathy.  His oropharynx is clear without mucosal changes.  He has no focal bone tenderness at any site.  His lungs are clear; his heart tones are regular without a gallop.  His abdomen is soft and nontender.  He has no peripheral edema.  His only occasional scattered patches of scaly psoriatic-like skin lesions.    Laboratory testing showed persisting thrombocytopenia but higher than its been in the last " 6 months.  There is no biochemical evidence of recurrent myeloma and his bone marrow, 10% cellular, shows no myeloma morphologically or by flow cytometry.  No imaging was done.  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    He has low bone density on the DEXA scan, particularly in his left hip (T score -2.3).    He continues to take calcium plus vitamin D and I told him he could get over-the-counter combination medicines including both in 1 pill.  He continues losartan and paroxetine as well.    Overall he is doing well and needs no active other interventions.  If his arthritis becomes worse or his psoriasis flares it is important to avoid any myelosuppressive medication.  His marrow reserve is limited.    He will return for follow-up in 6 months time but he knows to call if additional problems arise    Adebayo Lucio MD    Professor of medicine    Results for SAMEER KNIGHT (MRN [id number]) as of 5/21/2021 15:57   Ref. Range 5/17/2021 08:00 5/17/2021 09:29 5/17/2021 09:31 5/17/2021 09:47 5/17/2021 09:50   Sodium Latest Ref Range: 133 - 144 mmol/L  140      Potassium Latest Ref Range: 3.4 - 5.3 mmol/L  4.3      Chloride Latest Ref Range: 94 - 109 mmol/L  110 (H)      Carbon Dioxide Latest Ref Range: 20 - 32 mmol/L  26      Urea Nitrogen Latest Ref Range: 7 - 30 mg/dL  17      Creatinine Latest Ref Range: 0.66 - 1.25 mg/dL  0.92      GFR Estimate Latest Ref Range: >60 mL/min/1.73_m2  89      GFR Estimate If Black Latest Ref Range: >60 mL/min/1.73_m2  >90      Calcium Latest Ref Range: 8.5 - 10.1 mg/dL  9.0      Anion Gap Latest Ref Range: 3 - 14 mmol/L  4      Magnesium Latest Ref Range: 1.6 - 2.3 mg/dL  2.4 (H)      Phosphorus Latest Ref Range: 2.5 - 4.5 mg/dL  2.3 (L)      Albumin Latest Ref Range: 3.4 - 5.0 g/dL  4.2      Protein Total Latest Ref Range: 6.8 - 8.8 g/dL  7.7      Bilirubin Total Latest Ref Range: 0.2 - 1.3 mg/dL  0.6       Alkaline Phosphatase Latest Ref Range: 40 - 150 U/L  77      ALT Latest Ref Range: 0 - 70 U/L  33      AST Latest Ref Range: 0 - 45 U/L  23      25 OH Vit D total Latest Ref Range: 20 - 75 ug/L  <50      25 OH Vit D2 Latest Units: ug/L  <5      25 OH Vit D3 Latest Units: ug/L  45      Creatinine Urine Timed Latest Ref Range: 1.00 - 2.00  1.34       Creatinine Urine Latest Units: mg/dL 116       Lactate Dehydrogenase Latest Ref Range: 85 - 227 U/L  185      Protein Random Urine Latest Units: g/L 0.12       Protein Total Urine g/gr Creatinine Latest Ref Range: 0 - 0.2 g/g Cr 0.10       Protein Total 24 Hr Urine Latest Ref Range: 0.04 - 0.23  0.13       Uric Acid Latest Ref Range: 3.5 - 7.2 mg/dL  5.1      URINALYSIS COLLECTION VOLUME & DURATION Unknown Rpt       Glucose Latest Ref Range: 70 - 99 mg/dL  72      WBC Latest Ref Range: 4.0 - 11.0 10e9/L  3.1 (L)      Hemoglobin Latest Ref Range: 13.3 - 17.7 g/dL  12.9 (L)      Hematocrit Latest Ref Range: 40.0 - 53.0 %  39.6 (L)      Platelet Count Latest Ref Range: 150 - 450 10e9/L  82 (L)      RBC Count Latest Ref Range: 4.4 - 5.9 10e12/L  3.67 (L)      MCV Latest Ref Range: 78 - 100 fl  108 (H)      MCH Latest Ref Range: 26.5 - 33.0 pg  35.1 (H)      MCHC Latest Ref Range: 31.5 - 36.5 g/dL  32.6      RDW Latest Ref Range: 10.0 - 15.0 %  13.0      Diff Method Unknown  Automated Method      % Neutrophils Latest Units: %  49.8      % Lymphocytes Latest Units: %  26.9      % Monocytes Latest Units: %  17.3      % Eosinophils Latest Units: %  5.4      % Basophils Latest Units: %  0.6      % Immature Granulocytes Latest Units: %  0.0      Nucleated RBCs Latest Ref Range: 0 /100  0      Absolute Neutrophil Latest Ref Range: 1.6 - 8.3 10e9/L  1.6      Absolute Lymphocytes Latest Ref Range: 0.8 - 5.3 10e9/L  0.8      Absolute Monocytes Latest Ref Range: 0.0 - 1.3 10e9/L  0.5      Absolute Eosinophils Latest Ref Range: 0.0 - 0.7 10e9/L  0.2      Absolute Basophils Latest Ref  Range: 0.0 - 0.2 10e9/L  0.0      Abs Immature Granulocytes Latest Ref Range: 0 - 0.4 10e9/L  0.0      Absolute Nucleated RBC Unknown  0.0      Albumin Fraction Latest Ref Range: 3.7 - 5.1 g/dL  4.8      Alpha 1 Fraction Latest Ref Range: 0.2 - 0.4 g/dL  0.3      Alpha 2 Fraction Latest Ref Range: 0.5 - 0.9 g/dL  0.7      Beta Fraction Latest Ref Range: 0.6 - 1.0 g/dL  0.6      CHROMOSOME BONE MARROW Unknown     Rpt   ELP Interpretation: Unknown  Two small monoclo...      ELP Interpretation Urine Unknown Albumin and globu...       FISH Unknown     Rpt   Gamma Fraction Latest Ref Range: 0.7 - 1.6 g/dL  1.0      IGA Latest Ref Range: 84 - 499 mg/dL  63 (L)      IGG Latest Ref Range: 610 - 1,616 mg/dL  1,089      IGM Latest Ref Range: 35 - 242 mg/dL  166      Immunofix ELP Urine Unknown No monoclonal pro...       Immunofixation ELP Unknown  (Note)      Kappa Free Lt Chain Latest Ref Range: 0.33 - 1.94 mg/dL  1.09      Kappa Lambda Ratio Latest Ref Range: 0.26 - 1.65   0.58      Lambda Free Lt Chain Latest Ref Range: 0.57 - 2.63 mg/dL  1.89      LEUKEMIA LYMPHOMA EVALUATION (FLOW CYTOMETRY) Unknown     Rpt   Monoclonal Peak Latest Ref Range: 0.0 g/dL  0.2 (H)      PROCESS AND HOLD DNA Unknown     Rpt   BONE MARROW BIOPSY Unknown    Rpt    DX HIP/PELVIS/SPINE Unknown   Rpt       05/17/21 0929    Result status: Final   Resulting lab: [facility]   Value: (Note)   Comment: Two monoclonal IgM immunoglobulins of lambda light chain type, one is   very small. Pathological significance requires clinical correlation.      Patient Name: SHIMON KNIGHT   MR#: [id number]   Specimen #: [id number]   Collected: 5/17/2021   Received: 5/17/2021   Reported: 5/18/2021 16:57   Ordering Phy(s): JOLENE HOLT     For improved result formatting, select 'View Enhanced Report Format' under    Linked Documents section.     TEST(S):   Unilateral Bone Marrow Biopsy/Aspiration     FINAL DIAGNOSIS:   Bone  marrow, posterior iliac crest, left decalcified trephine biopsy and   touch imprint; left particle crush,   direct aspirate smear, and concentrated aspirate smear; and peripheral   blood smear:     - No morphologic or immunophenotypic evidence of plasma cell neoplasm   (see comment)     - Hypocellular bone marrow (10%) in a suboptimal biopsy with rare   polytypic plasma cells     - Peripheral blood showing slight normochromic, macrocytic anemia;   slight leukopenia; moderate   thrombocytopenia     COMMENT:   Concurrent flow cytometry ([id number]) shows polytypic plasma cells.   ----  Patient name:                          Angel Yanez  Patient demographics:            60 year old White Male   History:                                    Evaluation of Bone Density  Current treatments:                 Calcium, Vitamin D  Scan:                                       [email or web address]        Conclusions:  The most negative and valid T-score of -2.3 at the level of the left femoral neck corresponds with low bone density according to WHO criteria for postmenopausal females and men age 50 and over. The risk of osteoporotic fracture increases approximately 2-fold for each 1.0 SD decrease in T-score.     Low bone density is not the only risk factor for fracture; consider factors such as patient's age, fall risk, injury risk, previous osteoporotic fracture, family history of osteoporosis, etc.  People with an elevated risk of fracture should be regularly evaluated for low bone mineral density.  For patients eligible for Medicare, routine testing is allowed once every 2 years. Testing frequency can be increased for patients on corticosteroids. Clinical correlation is recommended.           Adebayo Lucio MD   alert/smiling, babbling

## 2021-06-08 LAB
COPATH REPORT: NORMAL
COPATH REPORT: NORMAL

## 2021-07-24 NOTE — SUMMARY OF CARE
BMT Summary of Care    This note has data from a flowsheet    September 29, 2019 10:46 AM  Angel Yanez  MRN: [id number]    Discharge Date: 9/29/19  BMT Primary Physician: Dr Lucio    BMT Nurse Coordinator: Su Pate RN    Discharge Diagnosis: S/P readmission for fevers of unknown etiology- favor viral infection.     Discharge To: home    Activity: as tolerated    Catheter Care: None    IV Medications through home infusion: None    Nutrition: Regular diet as tolerated    Blood Transfusions:  Transfuse if Hemoglobin < or equal 8.0 g/dL  Red Blood Cell Order: 2 units, irradiated and leukoreduced   Transfuse if Platelet count < or equal 1 uL  Platelet order: 1 adult dose, irradiated and leukoreduced  Transfusion Pre-meds:  None    Intravenous Electrolyte Replacement:  Potassium  chloride (give only if serum creatinine < 2)     3-3.3  20mEq/hr  over 1 hour x 2 doses     <3      20mEq/hr  over 1 hour x 3 doses  Magnesium sulfate 1.3-1.7     2 grams over 1 hour x1 dose                                     <1.3         2 grams over 2 hours x1 dose    Outpatient Pharmacy:  G-CSF to be given in clinic: (dose) as needed to keep ANC >1000.     Laboratory Tests:  At next clinic appointment (date: 10/1/19)  CBC,CMP and type and screen     Support Services:  None    Appointments:   BMT Clinic (date, time, provider): 10/1/19 for labs and provider visit and possible need for platelets or RBCS. Appt time is pending. But should see on [employer], or call Su Pate Monday [phone]         today

## 2021-08-28 DIAGNOSIS — Z86.2 PERSONAL HISTORY OF DISEASES OF BLOOD AND BLOOD-FORMING ORGANS: ICD-10-CM

## 2021-08-28 DIAGNOSIS — Z94.81 STATUS POST BONE MARROW TRANSPLANT (H): ICD-10-CM

## 2021-08-28 DIAGNOSIS — C90.00 MULTIPLE MYELOMA NOT HAVING ACHIEVED REMISSION (H): ICD-10-CM

## 2021-08-30 RX ORDER — PAROXETINE 20 MG/1
20 TABLET, FILM COATED ORAL EVERY MORNING
Qty: 90 TABLET | Refills: 0 | Status: SHIPPED | OUTPATIENT
Start: 2021-08-30 | End: 2021-11-22

## 2021-08-31 NOTE — NURSING NOTE
"Oncology Rooming Note    June 11, 2019 9:29 AM   Angel Yanez is a 59 year old male who presents for:    Chief Complaint   Patient presents with     RECHECK     Pt here for routine visit with Provider, Labs, and Day 28 BMBX. Pt is s/p BMT for Multiple Myeloma.      Initial Vitals: /84   Pulse 117   Temp 97.2  F (36.2  C) (Oral)   Resp 18   Wt 79.7 kg (175 lb 11.3 oz)   SpO2 96%   BMI 25.94 kg/m   Estimated body mass index is 25.94 kg/m  as calculated from the following:    Height as of 6/5/19: 1.753 m (5' 9.02\").    Weight as of this encounter: 79.7 kg (175 lb 11.3 oz). Body surface area is 1.97 meters squared.  No Pain (0) Comment: Data Unavailable   No LMP for male patient.  Allergies reviewed: Yes  Medications reviewed: Yes    Medications: Medication refills not needed today.  Pharmacy name entered into EPIC:    [facility] - [place], [place] - [address] [facility] - [place], [place] - [address] [facility]    Clinical concerns: Pt here for BMBX today. Pt does not want IV Versed with Procedure. Pt here by himself today. Is not currently taking any blood thinners.     Pt with a bit of a cold/cough. Denies fevers in the recent past.  SHANNON Delgado was notified.     GCSF 480 mcg subcutaneous injection administered in Right Upper Arm per standing orders.       Radha Coker RN    " General Sunscreen Counseling: I recommended a broad spectrum sunscreen with a SPF of 30 or higher. I explained that SPF 30 sunscreens block approximately 97 percent of the sun's harmful rays. Sunscreens should be applied at least 15 minutes prior to expected sun exposure and then every 2 hours after that as long as sun exposure continues. If swimming or exercising sunscreen should be reapplied every 45 minutes to an hour after getting wet or sweating. One ounce, or the equivalent of a shot glass full of sunscreen, is adequate to protect the skin not covered by a bathing suit. I also recommended a lip balm with a sunscreen as well. Sun protective clothing can be used in lieu of sunscreen but must be worn the entire time you are exposed to the sun's rays. Detail Level: Detailed

## 2021-09-19 ENCOUNTER — HEALTH MAINTENANCE LETTER (OUTPATIENT)
Age: 61
End: 2021-09-19

## 2021-11-18 ENCOUNTER — ONCOLOGY VISIT (OUTPATIENT)
Dept: TRANSPLANT | Facility: CLINIC | Age: 61
End: 2021-11-18
Attending: INTERNAL MEDICINE
Payer: COMMERCIAL

## 2021-11-18 ENCOUNTER — APPOINTMENT (OUTPATIENT)
Dept: LAB | Facility: CLINIC | Age: 61
End: 2021-11-18
Attending: INTERNAL MEDICINE
Payer: COMMERCIAL

## 2021-11-18 VITALS
TEMPERATURE: 98.3 F | SYSTOLIC BLOOD PRESSURE: 146 MMHG | DIASTOLIC BLOOD PRESSURE: 91 MMHG | WEIGHT: 184.9 LBS | BODY MASS INDEX: 25.79 KG/M2 | OXYGEN SATURATION: 98 % | HEART RATE: 84 BPM | RESPIRATION RATE: 16 BRPM

## 2021-11-18 DIAGNOSIS — I10 ESSENTIAL HYPERTENSION: ICD-10-CM

## 2021-11-18 DIAGNOSIS — C90.00 MULTIPLE MYELOMA NOT HAVING ACHIEVED REMISSION (H): Primary | ICD-10-CM

## 2021-11-18 DIAGNOSIS — C90.01 MULTIPLE MYELOMA IN REMISSION (H): ICD-10-CM

## 2021-11-18 DIAGNOSIS — Z94.81 STATUS POST BONE MARROW TRANSPLANT (H): ICD-10-CM

## 2021-11-18 LAB
ALBUMIN SERPL-MCNC: 4.4 G/DL (ref 3.4–5)
ALP SERPL-CCNC: 83 U/L (ref 40–150)
ALT SERPL W P-5'-P-CCNC: 30 U/L (ref 0–70)
ANION GAP SERPL CALCULATED.3IONS-SCNC: 3 MMOL/L (ref 3–14)
AST SERPL W P-5'-P-CCNC: 20 U/L (ref 0–45)
BASOPHILS # BLD AUTO: 0 10E3/UL (ref 0–0.2)
BASOPHILS NFR BLD AUTO: 1 %
BILIRUB SERPL-MCNC: 0.4 MG/DL (ref 0.2–1.3)
BUN SERPL-MCNC: 18 MG/DL (ref 7–30)
CALCIUM SERPL-MCNC: 9.9 MG/DL (ref 8.5–10.1)
CHLORIDE BLD-SCNC: 105 MMOL/L (ref 94–109)
CO2 SERPL-SCNC: 30 MMOL/L (ref 20–32)
CREAT SERPL-MCNC: 0.95 MG/DL (ref 0.66–1.25)
EOSINOPHIL # BLD AUTO: 0.1 10E3/UL (ref 0–0.7)
EOSINOPHIL NFR BLD AUTO: 4 %
ERYTHROCYTE [DISTWIDTH] IN BLOOD BY AUTOMATED COUNT: 12.5 % (ref 10–15)
GFR SERPL CREATININE-BSD FRML MDRD: 85 ML/MIN/1.73M2
GLUCOSE BLD-MCNC: 74 MG/DL (ref 70–99)
HCT VFR BLD AUTO: 42.4 % (ref 40–53)
HGB BLD-MCNC: 14.3 G/DL (ref 13.3–17.7)
IMM GRANULOCYTES # BLD: 0 10E3/UL
IMM GRANULOCYTES NFR BLD: 0 %
LYMPHOCYTES # BLD AUTO: 0.7 10E3/UL (ref 0.8–5.3)
LYMPHOCYTES NFR BLD AUTO: 18 %
MCH RBC QN AUTO: 35.6 PG (ref 26.5–33)
MCHC RBC AUTO-ENTMCNC: 33.7 G/DL (ref 31.5–36.5)
MCV RBC AUTO: 106 FL (ref 78–100)
MONOCYTES # BLD AUTO: 0.8 10E3/UL (ref 0–1.3)
MONOCYTES NFR BLD AUTO: 21 %
NEUTROPHILS # BLD AUTO: 2.1 10E3/UL (ref 1.6–8.3)
NEUTROPHILS NFR BLD AUTO: 56 %
NRBC # BLD AUTO: 0 10E3/UL
NRBC BLD AUTO-RTO: 0 /100
PLAT MORPH BLD: NORMAL
PLATELET # BLD AUTO: 91 10E3/UL (ref 150–450)
POTASSIUM BLD-SCNC: 3.9 MMOL/L (ref 3.4–5.3)
PROT SERPL-MCNC: 8.2 G/DL (ref 6.8–8.8)
RBC # BLD AUTO: 4.02 10E6/UL (ref 4.4–5.9)
RBC MORPH BLD: NORMAL
SODIUM SERPL-SCNC: 138 MMOL/L (ref 133–144)
TOTAL PROTEIN SERUM FOR ELP: 7.9 G/DL (ref 6.8–8.8)
WBC # BLD AUTO: 3.7 10E3/UL (ref 4–11)

## 2021-11-18 PROCEDURE — 99214 OFFICE O/P EST MOD 30 MIN: CPT | Performed by: INTERNAL MEDICINE

## 2021-11-18 PROCEDURE — G0009 ADMIN PNEUMOCOCCAL VACCINE: HCPCS | Performed by: INTERNAL MEDICINE

## 2021-11-18 PROCEDURE — 85025 COMPLETE CBC W/AUTO DIFF WBC: CPT | Performed by: INTERNAL MEDICINE

## 2021-11-18 PROCEDURE — 90723 DTAP-HEP B-IPV VACCINE IM: CPT | Performed by: INTERNAL MEDICINE

## 2021-11-18 PROCEDURE — G0463 HOSPITAL OUTPT CLINIC VISIT: HCPCS | Mod: 25

## 2021-11-18 PROCEDURE — 84165 PROTEIN E-PHORESIS SERUM: CPT | Mod: 26 | Performed by: PATHOLOGY

## 2021-11-18 PROCEDURE — 90471 IMMUNIZATION ADMIN: CPT | Performed by: INTERNAL MEDICINE

## 2021-11-18 PROCEDURE — 90648 HIB PRP-T VACCINE 4 DOSE IM: CPT | Performed by: INTERNAL MEDICINE

## 2021-11-18 PROCEDURE — 90670 PCV13 VACCINE IM: CPT | Performed by: INTERNAL MEDICINE

## 2021-11-18 PROCEDURE — 36415 COLL VENOUS BLD VENIPUNCTURE: CPT | Performed by: INTERNAL MEDICINE

## 2021-11-18 PROCEDURE — 250N000021 HC RX MED A9270 GY (STAT IND- M) 250: Performed by: INTERNAL MEDICINE

## 2021-11-18 PROCEDURE — 250N000011 HC RX IP 250 OP 636: Performed by: INTERNAL MEDICINE

## 2021-11-18 PROCEDURE — 82040 ASSAY OF SERUM ALBUMIN: CPT | Performed by: INTERNAL MEDICINE

## 2021-11-18 PROCEDURE — 90472 IMMUNIZATION ADMIN EACH ADD: CPT | Performed by: INTERNAL MEDICINE

## 2021-11-18 PROCEDURE — 84165 PROTEIN E-PHORESIS SERUM: CPT | Mod: TC | Performed by: PATHOLOGY

## 2021-11-18 PROCEDURE — G0463 HOSPITAL OUTPT CLINIC VISIT: HCPCS

## 2021-11-18 PROCEDURE — 84155 ASSAY OF PROTEIN SERUM: CPT | Performed by: INTERNAL MEDICINE

## 2021-11-18 RX ORDER — LOSARTAN POTASSIUM 25 MG/1
TABLET ORAL
COMMUNITY
Start: 2021-03-26 | End: 2021-11-18

## 2021-11-18 RX ORDER — LOSARTAN POTASSIUM 50 MG/1
25 TABLET ORAL DAILY
Qty: 30 TABLET | Refills: 11 | COMMUNITY
Start: 2021-11-18 | End: 2022-03-28

## 2021-11-18 RX ADMIN — DIPHTHERIA AND TETANUS TOXOIDS AND ACELLULAR PERTUSSIS ADSORBED, HEPATITIS B (RECOMBINANT) AND INACTIVATED POLIOVIRUS VACCINE COMBINED 0.5 ML: 25; 10; 25; 25; 8; 10; 40; 8; 32 INJECTION, SUSPENSION INTRAMUSCULAR at 14:50

## 2021-11-18 RX ADMIN — HAEMOPHILUS B POLYSACCHARIDE CONJUGATE VACCINE FOR INJ 0.5 ML: RECON SOLN at 14:56

## 2021-11-18 RX ADMIN — PNEUMOCOCCAL 13-VALENT CONJUGATE VACCINE 0.5 ML: 2.2; 2.2; 2.2; 2.2; 2.2; 4.4; 2.2; 2.2; 2.2; 2.2; 2.2; 2.2; 2.2 INJECTION, SUSPENSION INTRAMUSCULAR at 14:55

## 2021-11-18 ASSESSMENT — PAIN SCALES - GENERAL: PAINLEVEL: NO PAIN (0)

## 2021-11-18 NOTE — NURSING NOTE
Vaccines given in clinic, pt tolerated. VIS provided to pt, see MAR for details.    Haven Vogt, JORGE LUIS on 11/18/2021 at 2:59 PM

## 2021-11-18 NOTE — NURSING NOTE
"Oncology Rooming Note    November 18, 2021 1:53 PM   Angel Yanez is a 61 year old male who presents for:    Chief Complaint   Patient presents with     Blood Draw     Labs drawn via vpt by RN in lab. VS taken      Oncology Clinic Visit     Multiple myeloma      Initial Vitals: BP (!) 146/91   Pulse 84   Temp 98.3  F (36.8  C) (Oral)   Resp 16   Wt 83.9 kg (184 lb 14.4 oz)   SpO2 98%   BMI 25.79 kg/m   Estimated body mass index is 25.79 kg/m  as calculated from the following:    Height as of 5/20/21: 1.803 m (5' 11\").    Weight as of this encounter: 83.9 kg (184 lb 14.4 oz). Body surface area is 2.05 meters squared.  No Pain (0) Comment: Data Unavailable   No LMP for male patient.  Allergies reviewed: Yes  Medications reviewed: Yes    Medications: Medication refills not needed today.  Pharmacy name entered into EPIC:    [facility] - [place], [place] - [address] [facility] - [place], [place] - [address]    Clinical concerns: Wondering if he can get the booster shot       Connie Viveros LPN            "

## 2021-11-18 NOTE — NURSING NOTE
Chief Complaint   Patient presents with     Blood Draw     Labs drawn via vpt by RN in lab. VS taken      Labs collected from venipuncture by RN. Vitals taken. Checked in for next appointment.      Jadyn Leigh RN

## 2021-11-18 NOTE — LETTER
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  His blood counts have been good at the low end of normal white count and hemoglobin since 15 months ago and his platelet count is higher than its been.    He has had no fever chills rash bleeding or bruising.  He is working full-time and has not had any notable infections.  In the past he has had one hip replaced and the other is sore but it is not limiting and that he only occasionally needs to take a Tylenol and has no notable disability from it.  He has no other areas of bone discomfort that are apparent.    His exam shows his blood pressure slightly elevated on arrival but when he follows with his outside physician is managing his hypertension its been much better controlled.  His weight is stable over the last 6 months.  He has no focal bone tenderness at any site.  His oropharynx is clear without mucosal changes.  He has no bruises or petechiae.  There is no peripheral edema.  He has no rales or wheezes and his lungs are otherwise clear.  His heart tones are regular without a gallop.  There is no abdominal tenderness hepatosplenomegaly or masses.    Blood counts are acceptable with his hemoglobin in the normal range for the first time since transplant white count 3700 platelets 91,000 with an acceptable differential.  Chemistries are acceptable and he has no monoclonal myeloma protein on serum protein electrophoresis..    He seems to be doing well with no clinical signs of  recurrent myeloma.    We reviewed his vaccination schedule and today he will receive the third in the series of 3:  tetanus diphtheria pertussis injectable polio hepatitis B (Pediarix), Haemophilus influenza and pneumococcal vaccines.  He will then I told him to wait a week from now get his third Covid Pfizer  vaccine and subsequently a week or 2 after the Covid booster, he could receive his first live virus vaccine with measles mumps rubella.    We will ask him to return in 1 years time or he of course could follow with his outside oncology physician who he is not seen in some time but he could also come for ongoing follow-[facility] to the [facility].  Is good to see how well he is doing    Adebayo Lucio MD    Professor of medicine    Results for SAMEER KNIGHT (MRN [id number]) as of 11/19/2021 15:41   Ref. Range 11/18/2021 13:41   Sodium Latest Ref Range: 133 - 144 mmol/L 138   Potassium Latest Ref Range: 3.4 - 5.3 mmol/L 3.9   Chloride Latest Ref Range: 94 - 109 mmol/L 105   Carbon Dioxide Latest Ref Range: 20 - 32 mmol/L 30   Urea Nitrogen Latest Ref Range: 7 - 30 mg/dL 18   Creatinine Latest Ref Range: 0.66 - 1.25 mg/dL 0.95   GFR Estimate Latest Ref Range: >60 mL/min/1.73m2 85   Calcium Latest Ref Range: 8.5 - 10.1 mg/dL 9.9   Anion Gap Latest Ref Range: 3 - 14 mmol/L 3   Albumin Latest Ref Range: 3.4 - 5.0 g/dL 4.4   Protein Total Latest Ref Range: 6.8 - 8.8 g/dL 8.2   Bilirubin Total Latest Ref Range: 0.2 - 1.3 mg/dL 0.4   Alkaline Phosphatase Latest Ref Range: 40 - 150 U/L 83   ALT Latest Ref Range: 0 - 70 U/L 30   AST Latest Ref Range: 0 - 45 U/L 20   Glucose Latest Ref Range: 70 - 99 mg/dL 74   WBC Latest Ref Range: 4.0 - 11.0 10e3/uL 3.7 (L)   Hemoglobin Latest Ref Range: 13.3 - 17.7 g/dL 14.3   Hematocrit Latest Ref Range: 40.0 - 53.0 % 42.4   Platelet Count Latest Ref Range: 150 - 450 10e3/uL 91 (L)   RBC Count Latest Ref Range: 4.40 - 5.90 10e6/uL 4.02 (L)   MCV Latest Ref Range: 78 - 100 fL  106 (H)   MCH Latest Ref Range: 26.5 - 33.0 pg 35.6 (H)   MCHC Latest Ref Range: 31.5 - 36.5 g/dL 33.7   RDW Latest Ref Range: 10.0 - 15.0 % 12.5   % Neutrophils Latest Units: % 56   % Lymphocytes Latest Units: % 18   % Monocytes Latest Units: % 21   % Eosinophils Latest Units: % 4   % Basophils Latest Units: % 1   Absolute Basophils Latest Ref Range: 0.0 - 0.2 10e3/uL 0.0   Absolute Eosinophils Latest Ref Range: 0.0 - 0.7 10e3/uL 0.1   Absolute Immature Granulocytes Latest Ref Range: <=0.0 10e3/uL 0.0   Absolute Lymphocytes Latest Ref Range: 0.8 - 5.3 10e3/uL 0.7 (L)   Absolute Monocytes Latest Ref Range: 0.0 - 1.3 10e3/uL 0.8   % Immature Granulocytes Latest Units: % 0   Absolute Neutrophils Latest Ref Range: 1.6 - 8.3 10e3/uL 2.1   Absolute NRBCs Latest Units: 10e3/uL 0.0   NRBCs per 100 WBC Latest Ref Range: <1 /100 0   RBC Morphology Unknown Confirmed RBC Indices   Platelet Morphology Latest Ref Range: Automated Count Confirmed. Platelet morphology is normal.  Automated Count Confirmed. Platelet morphology is normal.   Albumin Fraction Latest Ref Range: 3.7 - 5.1 g/dL 4.8   Alpha 1 Fraction Latest Ref Range: 0.2 - 0.4 g/dL 0.4   Alpha 2 Fraction Latest Ref Range: 0.5 - 0.9 g/dL 0.8   Beta Fraction Latest Ref Range: 0.6 - 1.0 g/dL 0.7   ELP Interpretation: Unknown Essentially lynsey...   Gamma Fraction Latest Ref Range: 0.7 - 1.6 g/dL 1.2   Monoclonal Peak Latest Ref Range: <=0.0 g/dL 0.0   Total Protein Serum for ELP Latest Ref Range: 6.8 - 8.8 g/dL 7.9         Adebayo Lucio MD

## 2021-11-18 NOTE — PROGRESS NOTES
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  His blood counts have been good at the low end of normal white count and hemoglobin since 15 months ago and his platelet count is higher than its been.    He has had no fever chills rash bleeding or bruising.  He is working full-time and has not had any notable infections.  In the past he has had one hip replaced and the other is sore but it is not limiting and that he only occasionally needs to take a Tylenol and has no notable disability from it.  He has no other areas of bone discomfort that are apparent.    His exam shows his blood pressure slightly elevated on arrival but when he follows with his outside physician is managing his hypertension its been much better controlled.  His weight is stable over the last 6 months.  He has no focal bone tenderness at any site.  His oropharynx is clear without mucosal changes.  He has no bruises or petechiae.  There is no peripheral edema.  He has no rales or wheezes and his lungs are otherwise clear.  His heart tones are regular without a gallop.  There is no abdominal tenderness hepatosplenomegaly or masses.    Blood counts are acceptable with his hemoglobin in the normal range for the first time since transplant white count 3700 platelets 91,000 with an acceptable differential.  Chemistries are acceptable and he has no monoclonal myeloma protein on serum protein electrophoresis..    He seems to be doing well with no clinical signs of recurrent myeloma.    We reviewed his vaccination schedule and today he will receive the third in  the series of 3:  tetanus diphtheria pertussis injectable polio hepatitis B (Pediarix), Haemophilus influenza and pneumococcal vaccines.  He will then I told him to wait a week from now get his third Covid Pfizer  vaccine and subsequently a week or 2 after the Covid booster, he could receive his first live virus vaccine with measles mumps rubella.    We will ask him to return in 1 years time or he of course could follow with his outside oncology physician who he is not seen in some time but he could also come for ongoing follow-up to the [facility].  Is good to see how well he is doing    Adebayo Lucio MD    Professor of medicine    Results for SAMEER KNIGHT (MRN [id number]) as of 11/19/2021 15:41   Ref. Range 11/18/2021 13:41   Sodium Latest Ref Range: 133 - 144 mmol/L 138   Potassium Latest Ref Range: 3.4 - 5.3 mmol/L 3.9   Chloride Latest Ref Range: 94 - 109 mmol/L 105   Carbon Dioxide Latest Ref Range: 20 - 32 mmol/L 30   Urea Nitrogen Latest Ref Range: 7 - 30 mg/dL 18   Creatinine Latest Ref Range: 0.66 - 1.25 mg/dL 0.95   GFR Estimate Latest Ref Range: >60 mL/min/1.73m2 85   Calcium Latest Ref Range: 8.5 - 10.1 mg/dL 9.9   Anion Gap Latest Ref Range: 3 - 14 mmol/L 3   Albumin Latest Ref Range: 3.4 - 5.0 g/dL 4.4   Protein Total Latest Ref Range: 6.8 - 8.8 g/dL 8.2   Bilirubin Total Latest Ref Range: 0.2 - 1.3 mg/dL 0.4   Alkaline Phosphatase Latest Ref Range: 40 - 150 U/L 83   ALT Latest Ref Range: 0 - 70 U/L 30   AST Latest Ref Range: 0 - 45 U/L 20   Glucose Latest Ref Range: 70 - 99 mg/dL 74   WBC Latest Ref Range: 4.0 - 11.0 10e3/uL 3.7 (L)   Hemoglobin Latest Ref Range: 13.3 - 17.7 g/dL 14.3   Hematocrit Latest Ref Range: 40.0 - 53.0 % 42.4   Platelet Count Latest Ref Range: 150 - 450 10e3/uL 91 (L)   RBC Count Latest Ref Range: 4.40 - 5.90 10e6/uL 4.02 (L)   MCV Latest Ref Range: 78 - 100 fL 106 (H)   MCH Latest Ref Range: 26.5 - 33.0 pg 35.6 (H)   MCHC Latest Ref Range: 31.5 - 36.5 g/dL  33.7   RDW Latest Ref Range: 10.0 - 15.0 % 12.5   % Neutrophils Latest Units: % 56   % Lymphocytes Latest Units: % 18   % Monocytes Latest Units: % 21   % Eosinophils Latest Units: % 4   % Basophils Latest Units: % 1   Absolute Basophils Latest Ref Range: 0.0 - 0.2 10e3/uL 0.0   Absolute Eosinophils Latest Ref Range: 0.0 - 0.7 10e3/uL 0.1   Absolute Immature Granulocytes Latest Ref Range: <=0.0 10e3/uL 0.0   Absolute Lymphocytes Latest Ref Range: 0.8 - 5.3 10e3/uL 0.7 (L)   Absolute Monocytes Latest Ref Range: 0.0 - 1.3 10e3/uL 0.8   % Immature Granulocytes Latest Units: % 0   Absolute Neutrophils Latest Ref Range: 1.6 - 8.3 10e3/uL 2.1   Absolute NRBCs Latest Units: 10e3/uL 0.0   NRBCs per 100 WBC Latest Ref Range: <1 /100 0   RBC Morphology Unknown Confirmed RBC Indices   Platelet Morphology Latest Ref Range: Automated Count Confirmed. Platelet morphology is normal.  Automated Count Confirmed. Platelet morphology is normal.   Albumin Fraction Latest Ref Range: 3.7 - 5.1 g/dL 4.8   Alpha 1 Fraction Latest Ref Range: 0.2 - 0.4 g/dL 0.4   Alpha 2 Fraction Latest Ref Range: 0.5 - 0.9 g/dL 0.8   Beta Fraction Latest Ref Range: 0.6 - 1.0 g/dL 0.7   ELP Interpretation: Unknown Essentially lynsey...   Gamma Fraction Latest Ref Range: 0.7 - 1.6 g/dL 1.2   Monoclonal Peak Latest Ref Range: <=0.0 g/dL 0.0   Total Protein Serum for ELP Latest Ref Range: 6.8 - 8.8 g/dL 7.9

## 2021-11-18 NOTE — LETTER
11/18/2021         RE: Angel Yanez  [id number]  [place] [facility] [address]        Dear Colleague,    Thank you for referring your patient, Angel Yanez, to the [facility]. Please see a copy of my visit note below.    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  His blood counts have been good at the low end of normal white count and hemoglobin since 15 months ago and his platelet count is higher than its been.    He has had no fever chills rash bleeding or bruising.  He is working full-time and has not had any notable infections.  In the past he has had one hip replaced and the other is sore but it is not limiting and that he only occasionally needs to take a Tylenol and has no notable disability from it.  He has no other areas of bone discomfort that are apparent.    His exam shows his blood pressure slightly elevated on arrival but when he follows with his outside physician is managing his hypertension its been much better controlled.  His weight is stable over the last 6 months.  He has no focal bone tenderness at any site.  His oropharynx is clear without mucosal changes.  He has no bruises or petechiae.  There is no peripheral edema.  He has no rales or wheezes and his lungs are otherwise clear.  His heart tones are regular without a gallop.  There is no abdominal tenderness hepatosplenomegaly or masses.    Blood counts are acceptable with his hemoglobin in the normal range for the first time since transplant white count 3700 platelets 91,000 with  an acceptable differential.  Chemistries are acceptable and he has no monoclonal myeloma protein on serum protein electrophoresis..    He seems to be doing well with no clinical signs of recurrent myeloma.    We reviewed his vaccination schedule and today he will receive the third in the series of 3:  tetanus diphtheria pertussis injectable polio hepatitis B (Pediarix), Haemophilus influenza and pneumococcal vaccines.  He will then I told him to wait a week from now get his third Covid Pfizer  vaccine and subsequently a week or 2 after the Covid booster, he could receive his first live virus vaccine with measles mumps rubella.    We will ask him to return in 1 years time or he of course could follow with his outside oncology physician who he is not seen in some time but he could also come for ongoing follow-up to the [facility].  Is good to see how well he is doing    Aedbayo Lucio MD    Professor of medicine    Results for SAMEER KNIGHT (MRN [id number]) as of 11/19/2021 15:41   Ref. Range 11/18/2021 13:41   Sodium Latest Ref Range: 133 - 144 mmol/L 138   Potassium Latest Ref Range: 3.4 - 5.3 mmol/L 3.9   Chloride Latest Ref Range: 94 - 109 mmol/L 105   Carbon Dioxide Latest Ref Range: 20 - 32 mmol/L 30   Urea Nitrogen Latest Ref Range: 7 - 30 mg/dL 18   Creatinine Latest Ref Range: 0.66 - 1.25 mg/dL 0.95   GFR Estimate Latest Ref Range: >60 mL/min/1.73m2 85   Calcium Latest Ref Range: 8.5 - 10.1 mg/dL 9.9   Anion Gap Latest Ref Range: 3 - 14 mmol/L 3   Albumin Latest Ref Range: 3.4 - 5.0 g/dL 4.4   Protein Total Latest Ref Range: 6.8 - 8.8 g/dL 8.2   Bilirubin Total Latest Ref Range: 0.2 - 1.3 mg/dL 0.4   Alkaline Phosphatase Latest Ref Range: 40 - 150 U/L 83   ALT Latest Ref Range: 0 - 70 U/L 30   AST Latest Ref Range: 0 - 45 U/L 20   Glucose Latest Ref Range: 70 - 99 mg/dL 74   WBC Latest Ref Range: 4.0 - 11.0 10e3/uL 3.7 (L)   Hemoglobin Latest Ref Range: 13.3 - 17.7 g/dL 14.3   Hematocrit Latest  Ref Range: 40.0 - 53.0 % 42.4   Platelet Count Latest Ref Range: 150 - 450 10e3/uL 91 (L)   RBC Count Latest Ref Range: 4.40 - 5.90 10e6/uL 4.02 (L)   MCV Latest Ref Range: 78 - 100 fL 106 (H)   MCH Latest Ref Range: 26.5 - 33.0 pg 35.6 (H)   MCHC Latest Ref Range: 31.5 - 36.5 g/dL 33.7   RDW Latest Ref Range: 10.0 - 15.0 % 12.5   % Neutrophils Latest Units: % 56   % Lymphocytes Latest Units: % 18   % Monocytes Latest Units: % 21   % Eosinophils Latest Units: % 4   % Basophils Latest Units: % 1   Absolute Basophils Latest Ref Range: 0.0 - 0.2 10e3/uL 0.0   Absolute Eosinophils Latest Ref Range: 0.0 - 0.7 10e3/uL 0.1   Absolute Immature Granulocytes Latest Ref Range: <=0.0 10e3/uL 0.0   Absolute Lymphocytes Latest Ref Range: 0.8 - 5.3 10e3/uL 0.7 (L)   Absolute Monocytes Latest Ref Range: 0.0 - 1.3 10e3/uL 0.8   % Immature Granulocytes Latest Units: % 0   Absolute Neutrophils Latest Ref Range: 1.6 - 8.3 10e3/uL 2.1   Absolute NRBCs Latest Units: 10e3/uL 0.0   NRBCs per 100 WBC Latest Ref Range: <1 /100 0   RBC Morphology Unknown Confirmed RBC Indices   Platelet Morphology Latest Ref Range: Automated Count Confirmed. Platelet morphology is normal.  Automated Count Confirmed. Platelet morphology is normal.   Albumin Fraction Latest Ref Range: 3.7 - 5.1 g/dL 4.8   Alpha 1 Fraction Latest Ref Range: 0.2 - 0.4 g/dL 0.4   Alpha 2 Fraction Latest Ref Range: 0.5 - 0.9 g/dL 0.8   Beta Fraction Latest Ref Range: 0.6 - 1.0 g/dL 0.7   ELP Interpretation: Unknown Essentially lynsey...   Gamma Fraction Latest Ref Range: 0.7 - 1.6 g/dL 1.2   Monoclonal Peak Latest Ref Range: <=0.0 g/dL 0.0   Total Protein Serum for ELP Latest Ref Range: 6.8 - 8.8 g/dL 7.9       Adebayo Lucio MD

## 2021-11-19 LAB
ALBUMIN SERPL ELPH-MCNC: 4.8 G/DL (ref 3.7–5.1)
ALPHA1 GLOB SERPL ELPH-MCNC: 0.4 G/DL (ref 0.2–0.4)
ALPHA2 GLOB SERPL ELPH-MCNC: 0.8 G/DL (ref 0.5–0.9)
B-GLOBULIN SERPL ELPH-MCNC: 0.7 G/DL (ref 0.6–1)
GAMMA GLOB SERPL ELPH-MCNC: 1.2 G/DL (ref 0.7–1.6)
M PROTEIN SERPL ELPH-MCNC: 0 G/DL
PROT PATTERN SERPL ELPH-IMP: NORMAL

## 2021-11-20 DIAGNOSIS — C90.00 MULTIPLE MYELOMA NOT HAVING ACHIEVED REMISSION (H): ICD-10-CM

## 2021-11-20 DIAGNOSIS — Z94.81 STATUS POST BONE MARROW TRANSPLANT (H): ICD-10-CM

## 2021-11-20 DIAGNOSIS — Z86.2 PERSONAL HISTORY OF DISEASES OF BLOOD AND BLOOD-FORMING ORGANS: ICD-10-CM

## 2021-11-22 RX ORDER — PAROXETINE 20 MG/1
20 TABLET, FILM COATED ORAL EVERY MORNING
Qty: 90 TABLET | Refills: 4 | Status: SHIPPED | OUTPATIENT
Start: 2021-11-22

## 2022-01-06 NOTE — NURSING NOTE
Neupogen 480 MCG. Sub-Q injection right arm . See MAR. Pt. Tolerated well.    Kathy VALLEJOA         show

## 2022-01-09 ENCOUNTER — HEALTH MAINTENANCE LETTER (OUTPATIENT)
Age: 62
End: 2022-01-09

## 2022-03-28 DIAGNOSIS — C90.00 MULTIPLE MYELOMA NOT HAVING ACHIEVED REMISSION (H): ICD-10-CM

## 2022-03-28 DIAGNOSIS — I10 ESSENTIAL HYPERTENSION: ICD-10-CM

## 2022-03-28 DIAGNOSIS — Z94.81 STATUS POST BONE MARROW TRANSPLANT (H): ICD-10-CM

## 2022-03-28 RX ORDER — LOSARTAN POTASSIUM 50 MG/1
25 TABLET ORAL DAILY
Qty: 30 TABLET | Refills: 3 | Status: SHIPPED | OUTPATIENT
Start: 2022-03-28

## 2022-06-24 NOTE — PROGRESS NOTES
Infusion Nursing Note:  Angel SUMMER Renata presents today for add-on transfusion.    Patient seen by provider today: No   present during visit today: Not Applicable.    Note: Labs were monitored.    Intravenous Access:  Hsieh.    Treatment Conditions:  Patient received an add-on platelet transfusion for a platelet count of 4.      Post Infusion Assessment:  Patient tolerated transfusion without incident.       Discharge Plan:   Patient discharged in stable condition accompanied by: self.    CLARA ELLSWORTH RN                         patient

## 2022-08-04 DIAGNOSIS — Z94.81 STATUS POST BONE MARROW TRANSPLANT (H): ICD-10-CM

## 2022-08-04 RX ORDER — CEPHALEXIN 500 MG/1
2000 CAPSULE ORAL
Qty: 4 CAPSULE | Refills: 3 | Status: SHIPPED | OUTPATIENT
Start: 2022-08-04

## 2022-11-21 ENCOUNTER — HEALTH MAINTENANCE LETTER (OUTPATIENT)
Age: 62
End: 2022-11-21

## 2022-11-25 DIAGNOSIS — Z86.2 PERSONAL HISTORY OF DISEASES OF BLOOD AND BLOOD-FORMING ORGANS: ICD-10-CM

## 2022-11-25 DIAGNOSIS — Z94.81 STATUS POST BONE MARROW TRANSPLANT (H): ICD-10-CM

## 2022-11-25 DIAGNOSIS — C90.00 MULTIPLE MYELOMA NOT HAVING ACHIEVED REMISSION (H): ICD-10-CM

## 2022-12-06 RX ORDER — PAROXETINE 20 MG/1
20 TABLET, FILM COATED ORAL EVERY MORNING
Qty: 90 TABLET | Refills: 0 | OUTPATIENT
Start: 2022-12-06

## 2022-12-16 ENCOUNTER — LAB REQUISITION (OUTPATIENT)
Dept: LAB | Facility: CLINIC | Age: 62
End: 2022-12-16
Payer: COMMERCIAL

## 2022-12-16 DIAGNOSIS — Z12.5 ENCOUNTER FOR SCREENING FOR MALIGNANT NEOPLASM OF PROSTATE: ICD-10-CM

## 2022-12-16 DIAGNOSIS — Z13.220 ENCOUNTER FOR SCREENING FOR LIPOID DISORDERS: ICD-10-CM

## 2022-12-16 LAB
CHOLEST SERPL-MCNC: 232 MG/DL
HDLC SERPL-MCNC: 42 MG/DL
LDLC SERPL CALC-MCNC: 169 MG/DL
NONHDLC SERPL-MCNC: 190 MG/DL
PSA SERPL-MCNC: 0.16 NG/ML (ref 0–4.5)
TRIGL SERPL-MCNC: 104 MG/DL

## 2022-12-16 PROCEDURE — 80061 LIPID PANEL: CPT | Mod: ORL | Performed by: PHYSICIAN ASSISTANT

## 2022-12-16 PROCEDURE — G0103 PSA SCREENING: HCPCS | Mod: ORL | Performed by: PHYSICIAN ASSISTANT

## 2023-01-01 ENCOUNTER — LAB REQUISITION (OUTPATIENT)
Dept: LAB | Facility: CLINIC | Age: 63
End: 2023-01-01

## 2023-01-01 DIAGNOSIS — L29.9 PRURITUS, UNSPECIFIED: ICD-10-CM

## 2023-01-01 LAB
ALBUMIN SERPL BCG-MCNC: 4.3 G/DL (ref 3.5–5.2)
ALP SERPL-CCNC: 79 U/L (ref 40–129)
ALT SERPL W P-5'-P-CCNC: 28 U/L (ref 0–70)
ANION GAP SERPL CALCULATED.3IONS-SCNC: 13 MMOL/L (ref 7–15)
AST SERPL W P-5'-P-CCNC: 21 U/L (ref 0–45)
BILIRUB SERPL-MCNC: 0.3 MG/DL
BUN SERPL-MCNC: 14.1 MG/DL (ref 8–23)
CALCIUM SERPL-MCNC: 9.7 MG/DL (ref 8.8–10.2)
CHLORIDE SERPL-SCNC: 103 MMOL/L (ref 98–107)
CREAT SERPL-MCNC: 0.92 MG/DL (ref 0.67–1.17)
CRP SERPL-MCNC: 5.93 MG/L
DEPRECATED HCO3 PLAS-SCNC: 26 MMOL/L (ref 22–29)
EGFRCR SERPLBLD CKD-EPI 2021: >90 ML/MIN/1.73M2
ERYTHROCYTE [SEDIMENTATION RATE] IN BLOOD BY WESTERGREN METHOD: 26 MM/HR (ref 0–20)
GLUCOSE SERPL-MCNC: 85 MG/DL (ref 70–99)
POTASSIUM SERPL-SCNC: 4.5 MMOL/L (ref 3.4–5.3)
PROT SERPL-MCNC: 7.4 G/DL (ref 6.4–8.3)
SODIUM SERPL-SCNC: 142 MMOL/L (ref 135–145)

## 2023-01-01 PROCEDURE — 86140 C-REACTIVE PROTEIN: CPT | Performed by: PHYSICIAN ASSISTANT

## 2023-01-01 PROCEDURE — 85652 RBC SED RATE AUTOMATED: CPT | Performed by: PHYSICIAN ASSISTANT

## 2023-01-01 PROCEDURE — 80053 COMPREHEN METABOLIC PANEL: CPT | Performed by: PHYSICIAN ASSISTANT

## 2023-04-16 ENCOUNTER — HEALTH MAINTENANCE LETTER (OUTPATIENT)
Age: 63
End: 2023-04-16

## 2023-05-19 ENCOUNTER — LAB REQUISITION (OUTPATIENT)
Dept: LAB | Facility: CLINIC | Age: 63
End: 2023-05-19

## 2023-05-19 DIAGNOSIS — R50.9 FEVER, UNSPECIFIED: ICD-10-CM

## 2023-05-19 LAB — ERYTHROCYTE [SEDIMENTATION RATE] IN BLOOD BY WESTERGREN METHOD: 60 MM/HR (ref 0–20)

## 2023-05-19 PROCEDURE — 80053 COMPREHEN METABOLIC PANEL: CPT | Performed by: PHYSICIAN ASSISTANT

## 2023-05-19 PROCEDURE — 85652 RBC SED RATE AUTOMATED: CPT | Performed by: PHYSICIAN ASSISTANT

## 2023-05-19 PROCEDURE — 86140 C-REACTIVE PROTEIN: CPT | Performed by: PHYSICIAN ASSISTANT

## 2023-05-20 LAB
ALBUMIN SERPL BCG-MCNC: 3.7 G/DL (ref 3.5–5.2)
ALP SERPL-CCNC: 270 U/L (ref 40–129)
ALT SERPL W P-5'-P-CCNC: 115 U/L (ref 10–50)
ANION GAP SERPL CALCULATED.3IONS-SCNC: 13 MMOL/L (ref 7–15)
AST SERPL W P-5'-P-CCNC: 44 U/L (ref 10–50)
BILIRUB SERPL-MCNC: 0.4 MG/DL
BUN SERPL-MCNC: 17.7 MG/DL (ref 8–23)
CALCIUM SERPL-MCNC: 8.6 MG/DL (ref 8.8–10.2)
CHLORIDE SERPL-SCNC: 98 MMOL/L (ref 98–107)
CREAT SERPL-MCNC: 1.09 MG/DL (ref 0.67–1.17)
CRP SERPL-MCNC: 141 MG/L
DEPRECATED HCO3 PLAS-SCNC: 26 MMOL/L (ref 22–29)
GFR SERPL CREATININE-BSD FRML MDRD: 76 ML/MIN/1.73M2
GLUCOSE SERPL-MCNC: 112 MG/DL (ref 70–99)
POTASSIUM SERPL-SCNC: 3.8 MMOL/L (ref 3.4–5.3)
PROT SERPL-MCNC: 6.3 G/DL (ref 6.4–8.3)
SODIUM SERPL-SCNC: 137 MMOL/L (ref 136–145)

## 2023-05-31 NOTE — NURSING NOTE
RN Note:    D:  GCSF subcutaneous administered x 1 in the left arm.  Dressing change done.  Patient skin around dressing was rashy and red.  Patient reports itching despite dressing being changed to       IV 3000.  Patient has a history of Hsieh oozing.  IR has looked at it and reported it was fine according to patient.  Chlorehexidine patch causing irritation.    I:  Iodine swabs used.  Patch not used. 2x2 gauze used to visualize oozing.  Hsieh covered with IV 3000.  A:  Patient reports comfort with this set up.  P:  Will continue to monitor oozing.  As long as Hsieh is oozing, it will cause irritation to the skin, so may need to be changed daily.     Quadrants Reporting?: 0

## 2023-07-09 ENCOUNTER — HEALTH MAINTENANCE LETTER (OUTPATIENT)
Age: 63
End: 2023-07-09

## 2023-08-25 ENCOUNTER — LAB REQUISITION (OUTPATIENT)
Dept: LAB | Facility: CLINIC | Age: 63
End: 2023-08-25

## 2023-08-25 DIAGNOSIS — L29.9 PRURITUS, UNSPECIFIED: ICD-10-CM

## 2023-08-25 LAB
ALBUMIN SERPL BCG-MCNC: 4.2 G/DL (ref 3.5–5.2)
ALP SERPL-CCNC: 77 U/L (ref 40–129)
ALT SERPL W P-5'-P-CCNC: 12 U/L (ref 0–70)
ANION GAP SERPL CALCULATED.3IONS-SCNC: 11 MMOL/L (ref 7–15)
AST SERPL W P-5'-P-CCNC: 16 U/L (ref 0–45)
BILIRUB SERPL-MCNC: 0.4 MG/DL
BUN SERPL-MCNC: 11.4 MG/DL (ref 8–23)
CALCIUM SERPL-MCNC: 9 MG/DL (ref 8.8–10.2)
CHLORIDE SERPL-SCNC: 103 MMOL/L (ref 98–107)
CREAT SERPL-MCNC: 0.93 MG/DL (ref 0.67–1.17)
DEPRECATED HCO3 PLAS-SCNC: 25 MMOL/L (ref 22–29)
ERYTHROCYTE [SEDIMENTATION RATE] IN BLOOD BY WESTERGREN METHOD: 46 MM/HR (ref 0–20)
GFR SERPL CREATININE-BSD FRML MDRD: >90 ML/MIN/1.73M2
GLUCOSE SERPL-MCNC: 105 MG/DL (ref 70–99)
POTASSIUM SERPL-SCNC: 4.3 MMOL/L (ref 3.4–5.3)
PROT SERPL-MCNC: 6.6 G/DL (ref 6.4–8.3)
SODIUM SERPL-SCNC: 139 MMOL/L (ref 136–145)

## 2023-08-25 PROCEDURE — 85652 RBC SED RATE AUTOMATED: CPT | Performed by: PHYSICIAN ASSISTANT

## 2023-08-25 PROCEDURE — 80053 COMPREHEN METABOLIC PANEL: CPT | Performed by: PHYSICIAN ASSISTANT

## 2024-01-01 ENCOUNTER — HEALTH MAINTENANCE LETTER (OUTPATIENT)
Age: 64
End: 2024-01-01

## 2024-01-01 DIAGNOSIS — Z94.81 STATUS POST BONE MARROW TRANSPLANT (H): ICD-10-CM

## 2024-01-01 RX ORDER — CEPHALEXIN 500 MG/1
2000 CAPSULE ORAL
Qty: 4 CAPSULE | Refills: 3 | OUTPATIENT
Start: 2024-01-01

## 2024-04-16 NOTE — TELEPHONE ENCOUNTER
Cephalexin 500mg capsule  Last prescribing provider: Dr. Adebayo Grant    Last clinic visit date: 11/18/21 w/ Dr. Lucio    Recommendations for requested medication (if none, N/A): NA    Any other pertinent information (if none, N/A): fax request, comments say 3rd request    Refilled: Y/N, if NO, why?

## 2024-05-20 NOTE — NURSING NOTE
Chief Complaint   Patient presents with     Infusion     pt here for platelet administration, history of multiple myeloma.     Caitlin Webster RN    
[phone]

## 2024-05-30 NOTE — PLAN OF CARE
Temp this morning 103.4, administrated tylenol and celebrex PO temp down to 99.8, continue to have dry coughing, Hg and Platelet down today too, administrated platelet and RBC and low K level, administrated 40 meq x 2, waiting for evening lab, for recheck. Patient had fair oral intake and fair void, continue to monitor   Opt out

## 2024-08-05 NOTE — NURSING NOTE
"Oncology Rooming Note    May 22, 2019 12:24 PM   Angel Yanez is a 58 year old male who presents for:    Chief Complaint   Patient presents with     Blood Draw     labs drawn from cvc by rn.  vital signs taken.     RECHECK     RTN- mulitple myeloma     Initial Vitals: /83 (BP Location: Right arm, Patient Position: Sitting, Cuff Size: Adult Regular)   Pulse 110   Temp 98.3  F (36.8  C) (Oral)   Resp 16   Wt 81.1 kg (178 lb 14.4 oz)   SpO2 96%   BMI 26.35 kg/m   Estimated body mass index is 26.35 kg/m  as calculated from the following:    Height as of 5/21/19: 1.755 m (5' 9.09\").    Weight as of this encounter: 81.1 kg (178 lb 14.4 oz). Body surface area is 1.99 meters squared.  No Pain (0) Comment: Data Unavailable   No LMP for male patient.  Allergies reviewed: Yes  Medications reviewed: Yes    Medications: Medication refills not needed today.  Pharmacy name entered into EPIC:    [facility] - [place], [place] - [address]  [facility] - [place], [place] - [address]    Clinical concerns:  none    Vickie Harris CMA              "
Chief Complaint   Patient presents with     Blood Draw     labs drawn from cvc by rn.  vital signs taken.     CVC accessed by RN in lab, labs drawn.  Both lines flushed with heparin.    Vital signs taken.  Pt checked in to next appointment.  Caitlin Webster RN    
intermittent

## 2024-12-03 NOTE — ED NOTES
MD notified of critical platelet result.   The patient is Stable - Low risk of patient condition declining or worsening    Shift Goals  Clinical Goals: Monitor labs/vitals, Monitor for chest pain, heart cath planned for 12/3 AM  Patient Goals: Heart cath in AM  Family Goals: JAQUAN    Progress made toward(s) clinical / shift goals:        Problem: Knowledge Deficit - Standard  Goal: Patient and family/care givers will demonstrate understanding of plan of care, disease process/condition, diagnostic tests and medications  Outcome: Progressing  Note: Education provided on all medications, treatments, and plan of care, with pt verbalizing understanding.  Plan for heart catheterization in AM, pt aware and agreeable with plan.      Problem: Pain Management  Goal: Pain level will decrease to patient's comfort goal  Outcome: Progressing  Note: Adult pain scale utilized to assess pain. Education provided on pharmacologic, and non-pharmacologic, pain management methods, with pt verbalizing understanding. Pt's response to pain medication provided monitored for effectiveness.  No c/o CP at present.

## (undated) DEVICE — SUCTION MANIFOLD DORNOCH ULTRA CART UL-CL500

## (undated) DEVICE — SU SILK 2-0 SH 30" K833H

## (undated) DEVICE — LINEN TOWEL PACK X5 5464

## (undated) DEVICE — PREP SKIN SCRUB TRAY 4461A

## (undated) DEVICE — STPL SKIN 35W ROTATING HEAD PRW35

## (undated) DEVICE — LABEL MEDICATION SYSTEM 3303-P

## (undated) DEVICE — DRAPE MAYO STAND 23X54 8337

## (undated) DEVICE — DECANTER VIAL 2006S

## (undated) DEVICE — DRSG KERLIX 4 1/2"X4YDS ROLL 6715

## (undated) DEVICE — CONNECTOR STOPCOCK 3 WAY MALE LL HI-FLO MX9311L

## (undated) DEVICE — GLOVE PROTEXIS BLUE W/NEU-THERA 7.5  2D73EB75

## (undated) DEVICE — PREP POVIDONE IODINE SCRUB 7.5% 4OZ APL82212

## (undated) DEVICE — LUBRICANT INST KIT ENDO-LUBE 220-90

## (undated) DEVICE — TUBING SUCTION 10'X3/16" N510

## (undated) DEVICE — SYR 03ML LL W/O NDL 309657

## (undated) DEVICE — PREP POVIDONE IODINE SOLUTION 10% 4OZ

## (undated) DEVICE — KIT ENDO FIRST STEP DISINFECTANT 200ML W/POUCH EP-4

## (undated) DEVICE — CLIP HORIZON MED BLUE 002200

## (undated) DEVICE — NDL BX BONE MARROW 11GA 4"

## (undated) DEVICE — ADH SKIN CLOSURE PREMIERPRO EXOFIN 1.0ML 3470

## (undated) DEVICE — DECANTER BAG 2002S

## (undated) DEVICE — ENDO BRUSH CYTOLOGY 2.0MMX10MM 115CM BRONCH BC-202D-2010

## (undated) DEVICE — BASIN EMESIS STERILE  SSK9005A

## (undated) DEVICE — SOL WATER IRRIG 1000ML BOTTLE 2F7114

## (undated) DEVICE — LINEN TOWEL PACK X6 WHITE 5487

## (undated) DEVICE — SOL NACL 0.9% IRRIG 1000ML BOTTLE 2F7124

## (undated) DEVICE — DRAPE ISOLATION BAG 1003

## (undated) DEVICE — SYR 30ML LL W/O NDL 302832

## (undated) DEVICE — ENDO VALVE SUCTION BRONCH EVIS MAJ-209

## (undated) DEVICE — SU VICRYL 3-0 TIE 12X18" J904T

## (undated) DEVICE — GLOVE PROTEXIS POWDER FREE SMT 7.5  2D72PT75X

## (undated) DEVICE — DRAPE LAP TRANSVERSE 29421

## (undated) DEVICE — Device

## (undated) DEVICE — ENDO VALVE BX EVIS MAJ-210

## (undated) DEVICE — BNDG ELASTIC 4"X5YDS STERILE 6611-4S

## (undated) DEVICE — BLADE KNIFE SURG 15 371115

## (undated) DEVICE — SU VICRYL 3-0 SH 27" J316H

## (undated) DEVICE — ENDO VALVE SYR NDL KIT ULTRASOUND BRONCH NA-201SX-4022-A

## (undated) DEVICE — KIT BONE MARROW COLLECTION W/FLEX FILTERS X6R2107

## (undated) DEVICE — SU MONOCRYL 4-0 PS-2 27" UND Y426H

## (undated) DEVICE — CLIP HORIZON SM RED WIDE SLOT 001201

## (undated) DEVICE — ENDO VALVE SUCTION ULTRASOUND BRONCH MAJ-1414

## (undated) DEVICE — ENDO ADPT BRONCH SWIVEL Y A1002

## (undated) DEVICE — DRAPE IOBAN INCISE 13X13" 6640EZ

## (undated) RX ORDER — LIDOCAINE HYDROCHLORIDE 10 MG/ML
INJECTION, SOLUTION EPIDURAL; INFILTRATION; INTRACAUDAL; PERINEURAL
Status: DISPENSED
Start: 2019-02-20

## (undated) RX ORDER — LIDOCAINE HYDROCHLORIDE 10 MG/ML
INJECTION, SOLUTION EPIDURAL; INFILTRATION; INTRACAUDAL; PERINEURAL
Status: DISPENSED
Start: 2019-10-18

## (undated) RX ORDER — SODIUM CHLORIDE 9 MG/ML
INJECTION, SOLUTION INTRAVENOUS
Status: DISPENSED
Start: 2019-01-22

## (undated) RX ORDER — CEFAZOLIN SODIUM 2 G/100ML
INJECTION, SOLUTION INTRAVENOUS
Status: DISPENSED
Start: 2019-01-22

## (undated) RX ORDER — BUPIVACAINE HYDROCHLORIDE AND EPINEPHRINE 2.5; 5 MG/ML; UG/ML
INJECTION, SOLUTION INFILTRATION; PERINEURAL
Status: DISPENSED
Start: 2019-11-15

## (undated) RX ORDER — LIDOCAINE HYDROCHLORIDE 10 MG/ML
INJECTION, SOLUTION EPIDURAL; INFILTRATION; INTRACAUDAL; PERINEURAL
Status: DISPENSED
Start: 2019-01-22

## (undated) RX ORDER — ACETAMINOPHEN 500 MG
TABLET ORAL
Status: DISPENSED
Start: 2019-05-14

## (undated) RX ORDER — FENTANYL CITRATE 50 UG/ML
INJECTION, SOLUTION INTRAMUSCULAR; INTRAVENOUS
Status: DISPENSED
Start: 2019-12-16

## (undated) RX ORDER — ONDANSETRON 2 MG/ML
INJECTION INTRAMUSCULAR; INTRAVENOUS
Status: DISPENSED
Start: 2019-11-15

## (undated) RX ORDER — LIDOCAINE HYDROCHLORIDE 10 MG/ML
INJECTION, SOLUTION EPIDURAL; INFILTRATION; INTRACAUDAL; PERINEURAL
Status: DISPENSED
Start: 2019-05-16

## (undated) RX ORDER — FENTANYL CITRATE 50 UG/ML
INJECTION, SOLUTION INTRAMUSCULAR; INTRAVENOUS
Status: DISPENSED
Start: 2019-11-15

## (undated) RX ORDER — PROPOFOL 10 MG/ML
INJECTION, EMULSION INTRAVENOUS
Status: DISPENSED
Start: 2019-05-14

## (undated) RX ORDER — DEXAMETHASONE SODIUM PHOSPHATE 4 MG/ML
INJECTION, SOLUTION INTRA-ARTICULAR; INTRALESIONAL; INTRAMUSCULAR; INTRAVENOUS; SOFT TISSUE
Status: DISPENSED
Start: 2019-05-14

## (undated) RX ORDER — PHENYLEPHRINE HCL IN 0.9% NACL 1 MG/10 ML
SYRINGE (ML) INTRAVENOUS
Status: DISPENSED
Start: 2019-05-14

## (undated) RX ORDER — HEPARIN SODIUM,PORCINE 10 UNIT/ML
VIAL (ML) INTRAVENOUS
Status: DISPENSED
Start: 2019-05-16

## (undated) RX ORDER — ONDANSETRON 2 MG/ML
INJECTION INTRAMUSCULAR; INTRAVENOUS
Status: DISPENSED
Start: 2019-05-14

## (undated) RX ORDER — LIDOCAINE HYDROCHLORIDE 10 MG/ML
INJECTION, SOLUTION EPIDURAL; INFILTRATION; INTRACAUDAL; PERINEURAL
Status: DISPENSED
Start: 2019-11-15

## (undated) RX ORDER — LIDOCAINE HYDROCHLORIDE 10 MG/ML
INJECTION, SOLUTION EPIDURAL; INFILTRATION; INTRACAUDAL; PERINEURAL
Status: DISPENSED
Start: 2019-07-25

## (undated) RX ORDER — LIDOCAINE HYDROCHLORIDE 10 MG/ML
INJECTION, SOLUTION EPIDURAL; INFILTRATION; INTRACAUDAL; PERINEURAL
Status: DISPENSED
Start: 2019-07-23

## (undated) RX ORDER — PROPOFOL 10 MG/ML
INJECTION, EMULSION INTRAVENOUS
Status: DISPENSED
Start: 2019-11-15

## (undated) RX ORDER — PHENYLEPHRINE HCL IN 0.9% NACL 1 MG/10 ML
SYRINGE (ML) INTRAVENOUS
Status: DISPENSED
Start: 2019-11-15

## (undated) RX ORDER — FENTANYL CITRATE 50 UG/ML
INJECTION, SOLUTION INTRAMUSCULAR; INTRAVENOUS
Status: DISPENSED
Start: 2019-05-16

## (undated) RX ORDER — ALBUMIN, HUMAN INJ 5% 5 %
SOLUTION INTRAVENOUS
Status: DISPENSED
Start: 2019-05-14

## (undated) RX ORDER — SODIUM CHLORIDE 9 MG/ML
INJECTION, SOLUTION INTRAVENOUS
Status: DISPENSED
Start: 2019-12-16

## (undated) RX ORDER — LIDOCAINE HYDROCHLORIDE 20 MG/ML
INJECTION, SOLUTION EPIDURAL; INFILTRATION; INTRACAUDAL; PERINEURAL
Status: DISPENSED
Start: 2019-11-15

## (undated) RX ORDER — CEFAZOLIN SODIUM 2 G/100ML
INJECTION, SOLUTION INTRAVENOUS
Status: DISPENSED
Start: 2019-11-15

## (undated) RX ORDER — HEPARIN SODIUM (PORCINE) LOCK FLUSH IV SOLN 100 UNIT/ML 100 UNIT/ML
SOLUTION INTRAVENOUS
Status: DISPENSED
Start: 2019-01-24

## (undated) RX ORDER — BUPIVACAINE HYDROCHLORIDE AND EPINEPHRINE 2.5; 5 MG/ML; UG/ML
INJECTION, SOLUTION EPIDURAL; INFILTRATION; INTRACAUDAL; PERINEURAL
Status: DISPENSED
Start: 2019-11-15

## (undated) RX ORDER — HEPARIN SODIUM,PORCINE 10 UNIT/ML
VIAL (ML) INTRAVENOUS
Status: DISPENSED
Start: 2019-01-24

## (undated) RX ORDER — FENTANYL CITRATE 50 UG/ML
INJECTION, SOLUTION INTRAMUSCULAR; INTRAVENOUS
Status: DISPENSED
Start: 2019-10-18

## (undated) RX ORDER — EPHEDRINE SULFATE 50 MG/ML
INJECTION, SOLUTION INTRAMUSCULAR; INTRAVENOUS; SUBCUTANEOUS
Status: DISPENSED
Start: 2019-11-15

## (undated) RX ORDER — SODIUM CHLORIDE 9 MG/ML
INJECTION, SOLUTION INTRAVENOUS
Status: DISPENSED
Start: 2019-05-16